# Patient Record
Sex: MALE | Race: BLACK OR AFRICAN AMERICAN | NOT HISPANIC OR LATINO | Employment: OTHER | URBAN - METROPOLITAN AREA
[De-identification: names, ages, dates, MRNs, and addresses within clinical notes are randomized per-mention and may not be internally consistent; named-entity substitution may affect disease eponyms.]

---

## 2017-03-09 ENCOUNTER — HOSPITAL ENCOUNTER (EMERGENCY)
Facility: HOSPITAL | Age: 57
Discharge: HOME | End: 2017-03-09
Attending: EMERGENCY MEDICINE
Payer: MEDICAID

## 2017-03-09 VITALS
TEMPERATURE: 98 F | BODY MASS INDEX: 25.71 KG/M2 | WEIGHT: 160 LBS | RESPIRATION RATE: 18 BRPM | OXYGEN SATURATION: 97 % | HEIGHT: 66 IN | DIASTOLIC BLOOD PRESSURE: 93 MMHG | SYSTOLIC BLOOD PRESSURE: 183 MMHG | HEART RATE: 79 BPM

## 2017-03-09 DIAGNOSIS — J44.1 COPD WITH ACUTE EXACERBATION: ICD-10-CM

## 2017-03-09 DIAGNOSIS — R06.2 WHEEZING: ICD-10-CM

## 2017-03-09 DIAGNOSIS — J18.9 PNEUMONIA, UNSPECIFIED ORGANISM: Primary | ICD-10-CM

## 2017-03-09 PROCEDURE — 25000003 PHARM REV CODE 250: Performed by: EMERGENCY MEDICINE

## 2017-03-09 PROCEDURE — 25000242 PHARM REV CODE 250 ALT 637 W/ HCPCS: Performed by: PHYSICIAN ASSISTANT

## 2017-03-09 PROCEDURE — 96372 THER/PROPH/DIAG INJ SC/IM: CPT

## 2017-03-09 PROCEDURE — 63600175 PHARM REV CODE 636 W HCPCS: Performed by: PHYSICIAN ASSISTANT

## 2017-03-09 PROCEDURE — 63600175 PHARM REV CODE 636 W HCPCS: Performed by: EMERGENCY MEDICINE

## 2017-03-09 PROCEDURE — 99284 EMERGENCY DEPT VISIT MOD MDM: CPT | Mod: 25

## 2017-03-09 PROCEDURE — 94640 AIRWAY INHALATION TREATMENT: CPT

## 2017-03-09 PROCEDURE — 25000003 PHARM REV CODE 250: Performed by: PHYSICIAN ASSISTANT

## 2017-03-09 PROCEDURE — 99284 EMERGENCY DEPT VISIT MOD MDM: CPT | Mod: ,,, | Performed by: EMERGENCY MEDICINE

## 2017-03-09 RX ORDER — ALBUTEROL SULFATE 90 UG/1
1-2 AEROSOL, METERED RESPIRATORY (INHALATION) EVERY 6 HOURS PRN
Qty: 1 INHALER | Refills: 0 | Status: SHIPPED | OUTPATIENT
Start: 2017-03-09 | End: 2017-04-08

## 2017-03-09 RX ORDER — PREDNISONE 20 MG/1
60 TABLET ORAL
Status: COMPLETED | OUTPATIENT
Start: 2017-03-09 | End: 2017-03-09

## 2017-03-09 RX ORDER — LIDOCAINE HYDROCHLORIDE 10 MG/ML
1 INJECTION INFILTRATION; PERINEURAL ONCE
Status: COMPLETED | OUTPATIENT
Start: 2017-03-09 | End: 2017-03-09

## 2017-03-09 RX ORDER — AZITHROMYCIN 250 MG/1
500 TABLET, FILM COATED ORAL
Status: COMPLETED | OUTPATIENT
Start: 2017-03-09 | End: 2017-03-09

## 2017-03-09 RX ORDER — IPRATROPIUM BROMIDE 0.5 MG/2.5ML
0.5 SOLUTION RESPIRATORY (INHALATION)
Status: COMPLETED | OUTPATIENT
Start: 2017-03-09 | End: 2017-03-09

## 2017-03-09 RX ORDER — ALBUTEROL SULFATE 2.5 MG/.5ML
2.5 SOLUTION RESPIRATORY (INHALATION)
Status: COMPLETED | OUTPATIENT
Start: 2017-03-09 | End: 2017-03-09

## 2017-03-09 RX ORDER — PREDNISONE 20 MG/1
TABLET ORAL
Qty: 8 TABLET | Refills: 0 | Status: ON HOLD | OUTPATIENT
Start: 2017-03-09 | End: 2017-05-19 | Stop reason: HOSPADM

## 2017-03-09 RX ORDER — AZITHROMYCIN 500 MG/1
TABLET, FILM COATED ORAL
Qty: 4 TABLET | Refills: 0 | Status: ON HOLD | OUTPATIENT
Start: 2017-03-09 | End: 2017-05-19 | Stop reason: HOSPADM

## 2017-03-09 RX ORDER — CEFTRIAXONE 1 G/1
1 INJECTION, POWDER, FOR SOLUTION INTRAMUSCULAR; INTRAVENOUS
Status: DISCONTINUED | OUTPATIENT
Start: 2017-03-09 | End: 2017-03-09

## 2017-03-09 RX ORDER — CEFTRIAXONE 1 G/1
1 INJECTION, POWDER, FOR SOLUTION INTRAMUSCULAR; INTRAVENOUS
Status: COMPLETED | OUTPATIENT
Start: 2017-03-09 | End: 2017-03-09

## 2017-03-09 RX ADMIN — ALBUTEROL SULFATE 2.5 MG: 2.5 SOLUTION RESPIRATORY (INHALATION) at 09:03

## 2017-03-09 RX ADMIN — LIDOCAINE HYDROCHLORIDE 1 ML: 10 INJECTION, SOLUTION INFILTRATION; PERINEURAL at 10:03

## 2017-03-09 RX ADMIN — CEFTRIAXONE 1 G: 1 INJECTION, POWDER, FOR SOLUTION INTRAMUSCULAR; INTRAVENOUS at 10:03

## 2017-03-09 RX ADMIN — IPRATROPIUM BROMIDE 0.5 MG: 0.5 SOLUTION RESPIRATORY (INHALATION) at 09:03

## 2017-03-09 RX ADMIN — PREDNISONE 60 MG: 20 TABLET ORAL at 09:03

## 2017-03-09 RX ADMIN — AZITHROMYCIN 500 MG: 250 TABLET, FILM COATED ORAL at 10:03

## 2017-03-09 RX ADMIN — ALBUTEROL SULFATE 2.5 MG: 2.5 SOLUTION RESPIRATORY (INHALATION) at 10:03

## 2017-03-09 NOTE — ED PROVIDER NOTES
Encounter Date: 3/9/2017       History     Chief Complaint   Patient presents with    Shortness of Breath     patient states that he have a hx of COPD and SOB      Review of patient's allergies indicates:  No Known Allergies  HPI Comments: 56-year-old black male presents with COPD exacerbation.  Patient states it started approximately one week and gradually become significantly worse.  Patient states he is out of his inhaler, however, does not know the name of his inhalers.  Patient has significant fatigue, decreased physical activity, dyspnea on exertion, and a nonproductive cough.  Patient denies fever, chills, chest pain, peripheral edema, or new neurological changes.  Patient denies ear pain, sore throat, nasal congestion or postnasal drip.  Patient denies dysuria, hematuria, injury or trauma.  The patient continues to smoke.  Patient presents for evaluation treatment.    The history is provided by the patient.     Past Medical History:   Diagnosis Date    Hypertension     Seizures      History reviewed. No pertinent surgical history.  Family History   Problem Relation Age of Onset    Cancer Father     Hypertension Sister     Stroke Sister     Stroke Brother     Diabetes Neg Hx     Heart disease Neg Hx      Social History   Substance Use Topics    Smoking status: Current Every Day Smoker     Packs/day: 0.50    Smokeless tobacco: None    Alcohol use No     Review of Systems   Constitutional: Positive for activity change, appetite change and fatigue.   HENT: Negative for congestion, ear pain, facial swelling, nosebleeds, postnasal drip, rhinorrhea, sore throat and trouble swallowing.    Eyes: Negative for pain and redness.   Respiratory: Positive for cough, shortness of breath and wheezing.    Gastrointestinal: Negative for abdominal pain, nausea and vomiting.   Genitourinary: Negative for dysuria, frequency and hematuria.   Musculoskeletal: Negative for gait problem.   Skin: Negative for rash and  wound.   Neurological: Negative for seizures, speech difficulty and numbness.   Psychiatric/Behavioral: Negative for confusion and hallucinations.       Physical Exam   Initial Vitals   BP Pulse Resp Temp SpO2   03/09/17 0824 03/09/17 0824 03/09/17 0824 03/09/17 0824 03/09/17 0824   180/108 88 20 97.8 °F (36.6 °C) 97 %     Physical Exam    Nursing note and vitals reviewed.  Constitutional: He appears well-developed and well-nourished.   Well-developed well-nourished, thin, 56-year-old black male appears to be in moderately severe expiratory distress.   HENT:   Head: Normocephalic and atraumatic.   Right Ear: External ear normal.   Left Ear: External ear normal.   Nose: Nose normal.   Mouth/Throat: Oropharynx is clear and moist.   Eyes: Conjunctivae and EOM are normal. Pupils are equal, round, and reactive to light.   Neck: Normal range of motion. Neck supple.   Cardiovascular: Normal rate, regular rhythm and normal heart sounds. Exam reveals no friction rub.    No murmur heard.  Pulmonary/Chest: He has wheezes in the right upper field, the right middle field, the right lower field, the left upper field, the left middle field and the left lower field.   Breath sounds are extremely distant, generalized wheezing noted.   Musculoskeletal: Normal range of motion. He exhibits no edema or tenderness.   Neurological: He is alert and oriented to person, place, and time. He has normal strength.   Skin: Skin is warm and dry.   Psychiatric: He has a normal mood and affect. His behavior is normal. Judgment and thought content normal.         ED Course   Procedures  Labs Reviewed - No data to display          Medical Decision Making:   History:   Old Medical Records: I decided to obtain old medical records.  Old Records Summarized: records from clinic visits.       <> Summary of Records: I reviewed previous emergency room records.  Initial Assessment:   56-year-old black male presents with COPD exacerbation.  Patient states it  started approximately one week and gradually become significantly worse.  Patient states he is out of his inhaler, however, does not know the name of his inhalers.  Patient has significant fatigue, decreased physical activity, dyspnea on exertion, and a nonproductive cough.  Patient denies fever, chills, chest pain, peripheral edema, or new neurological changes.  Patient denies ear pain, sore throat, nasal congestion or postnasal drip.  Patient denies dysuria, hematuria, injury or trauma.  The patient continues to smoke.  Patient presents for evaluation treatment.  Differential Diagnosis:   COPD exacerbation, pneumonia, pulmonary mass, noncompliant with medical care and follow-up treatment  Independently Interpreted Test(s):   I have ordered and independently interpreted X-rays - see summary below.       <> Summary of X-Ray Reading(s):    1. Increased coarse right midlung zone markings possibly from a developing infiltrate.  2. Question COPD.  Clinical Tests:   Radiological Study: Ordered and Reviewed  ED Management:  Patient treated with nebulizer albuterol and Atrovent with excellent results.  Patient also given unknown 60 mg orally.  Chest x-ray reveals infiltrate in the right mid lung field.  Patient given Rocephin 1 g IM, and started on Zithromax.  Patient discharged on Zithromax, albuterol inhaler, and prednisone 40 mg daily for 4 days.  Patient instructed to follow-up with his PCP in 2 days.  Patient's clinically stable and able to be discharged.                   ED Course     Clinical Impression:   The primary encounter diagnosis was Pneumonia, unspecified organism. Diagnoses of Wheezing and COPD with acute exacerbation were also pertinent to this visit.          John Glotfelty, PA  03/09/17 110

## 2017-03-09 NOTE — ED AVS SNAPSHOT
[facility]  [address]  [facility] [address]               Baltazar Mccray   3/9/2017  8:45 AM   ED    Description:  Male : 1960   Department:  [facility]           Your Care was Coordinated By:     Provider Role From To    Hung Ortega MD Attending Provider [phone] --    John Glotfelty, PA Physician Assistant [phone] --      Reason for Visit     Shortness of Breath           Diagnoses this Visit        Comments    Pneumonia, unspecified organism    -  Primary     Wheezing         COPD with acute exacerbation           ED Disposition     ED Disposition Condition Comment    Discharge             To Do List           Follow-up Information     Follow up with YOUR PHYSICIAN. Schedule an appointment as soon as possible for a visit in 2 days.       These Medications        Disp Refills Start End    albuterol 90 mcg/actuation inhaler 1 Inhaler 0 3/9/2017 2017    Inhale 1-2 puffs into the lungs every 6 (six) hours as needed for Wheezing (WITH SPACER). - Inhalation    azithromycin (ZITHROMAX) 500 MG tablet 4 tablet 0 3/9/2017     START TOMORROW.  TAKE ONE TABLET DAILY AFTER EATING,  FOR INFECTION    predniSONE (DELTASONE) 20 MG tablet 8 tablet 0 3/9/2017     TAKE 2 TABLETS DAILY AFTER EATING FOR 4 DAYS. START TOMORROW.      [facility]     [facility] Care [facility] -  Assistance  Registered nurses in the [facility] provide clinical advisement, health education, appointment booking, and other advisory services.  Call for this free service at [phone].             Medications           Message regarding Medications     Verify the changes and/or additions to your medication regime listed below are the same as discussed with your clinician today.  If any of these changes or additions are incorrect, please notify your healthcare provider.        START taking these NEW medications        Refills     albuterol 90 mcg/actuation inhaler 0    Sig: Inhale 1-2 puffs into the lungs every 6 (six) hours as needed for Wheezing (WITH SPACER).    Class: Print    Route: Inhalation    azithromycin (ZITHROMAX) 500 MG tablet 0    Sig: START TOMORROW.  TAKE ONE TABLET DAILY AFTER EATING,  FOR INFECTION    Class: Print    predniSONE (DELTASONE) 20 MG tablet 0    Sig: TAKE 2 TABLETS DAILY AFTER EATING FOR 4 DAYS. START TOMORROW.    Class: Print      These medications were administered today        Dose Freq    albuterol sulfate nebulizer solution 2.5 mg 2.5 mg Every 5 min    Sig: Take 2.5 mg by nebulization every 5 (five) minutes.    Class: Normal    Route: Nebulization    ipratropium 0.02 % nebulizer solution 0.5 mg 0.5 mg ED 1 Time    Sig: Take 2.5 mLs (0.5 mg total) by nebulization ED 1 Time.    Class: Normal    Route: Nebulization    predniSONE tablet 60 mg 60 mg ED 1 Time    Sig: Take 3 tablets (60 mg total) by mouth ED 1 Time.    Class: Normal    Route: Oral    azithromycin tablet 500 mg 500 mg ED 1 Time    Sig: Take 2 tablets (500 mg total) by mouth ED 1 Time.    Class: Normal    Route: Oral    lidocaine HCL 10 mg/ml (1%) injection 1 mL 1 mL Once    Si mL by Other route once.    Class: Normal    Route: Other    cefTRIAXone injection 1 g 1 g ED 1 Time    Sig: Inject 1 g into the muscle ED 1 Time.    Class: Normal    Route: Intramuscular           Verify that the below list of medications is an accurate representation of the medications you are currently taking.  If none reported, the list may be blank. If incorrect, please contact your healthcare provider. Carry this list with you in case of emergency.           Current Medications     albuterol 90 mcg/actuation inhaler Inhale 1-2 puffs into the lungs every 6 (six) hours as needed for Wheezing (WITH SPACER).    albuterol sulfate nebulizer solution 2.5 mg Take 2.5 mg by nebulization every 5 (five) minutes.    azithromycin (ZITHROMAX) 500 MG tablet START TOMORROW.  TAKE  "ONE TABLET DAILY AFTER EATING,  FOR INFECTION    benazepril (LOTENSIN) 40 MG tablet Take 40 mg by mouth once daily.      hydrochlorothiazide (HYDRODIURIL) 12.5 MG Tab Take 12.5 mg by mouth every 8 (eight) hours.      levetiracetam (KEPPRA) 1000 MG tablet Take 1,000 mg by mouth 2 (two) times daily.      omeprazole (PRILOSEC) 20 MG capsule Take 1 capsule (20 mg total) by mouth once daily.    predniSONE (DELTASONE) 20 MG tablet TAKE 2 TABLETS DAILY AFTER EATING FOR 4 DAYS. START TOMORROW.           Clinical Reference Information           Your Vitals Were     BP Pulse Temp Resp Height Weight    180/108 (BP Location: Right arm, Patient Position: Sitting) 88 97.8 °F (36.6 °C) (Oral) 20 5' 6" (1.676 m) 72.6 kg (160 lb)    SpO2 BMI             97% 25.82 kg/m2         Allergies as of 3/9/2017     No Known Allergies      Immunizations Administered on Date of Encounter - 3/9/2017     None      ED Micro, Lab, POCT     None      ED Imaging Orders     Start Ordered       Status Ordering Provider    03/09/17 0856 03/09/17 0855  X-Ray Chest PA And Lateral  1 time imaging     Comments:  MUST GO BY WHEEL CHAIR    Final result       Discharge References/Attachments     ADULT, PNEUMONIA (ENGLISH)      [facility] Sign-Up     Activating your [facility] account is as easy as 1-2-3!     1) Visit [email or web address], select Sign Up Now, enter this activation code and your date of birth, then select Next.  [email or web address]  Expires: 4/23/2017 10:44 AM      2) Create a username and password to use when you visit [facility] in the future and select a security question in case you lose your password and select Next.    3) Enter your e-mail address and click Sign Up!    Additional Information  If you have questions, please e-mail [email or web address] or call [phone] to talk to our [facility] staff. Remember, [facility] is NOT to be used for urgent needs. For medical emergencies, dial 911.          [facility] complies with " applicable Federal civil rights laws and does not discriminate on the basis of race, color, national origin, age, disability, or sex.        Language Assistance Services     ATTENTION: Language assistance services are available, free of charge. Please call [phone].      ATENCIÓN: Si habla krunal, tiene a burns disposición servicios gratuitos de asistencia lingüística. Llame al [phone].     CHÚ Ý: N?u b?n nói Ti?ng Vi?t, có các d?ch v? h? tr? ngôn ng? mi?n phí dành cho b?n. G?i s? [phone].

## 2017-05-14 ENCOUNTER — HOSPITAL ENCOUNTER (INPATIENT)
Facility: HOSPITAL | Age: 57
LOS: 5 days | Discharge: HOME HEALTH | DRG: 192 | End: 2017-05-19
Attending: EMERGENCY MEDICINE | Admitting: INTERNAL MEDICINE
Payer: MEDICAID

## 2017-05-14 DIAGNOSIS — J44.1 COPD EXACERBATION: ICD-10-CM

## 2017-05-14 DIAGNOSIS — J44.9 COPD (CHRONIC OBSTRUCTIVE PULMONARY DISEASE): ICD-10-CM

## 2017-05-14 LAB
ALBUMIN SERPL BCP-MCNC: 3.1 G/DL
ALLENS TEST: ABNORMAL
ALP SERPL-CCNC: 105 U/L
ALT SERPL W/O P-5'-P-CCNC: 67 U/L
ANION GAP SERPL CALC-SCNC: 11 MMOL/L
AST SERPL-CCNC: 111 U/L
BACTERIA #/AREA URNS AUTO: NORMAL /HPF
BASOPHILS # BLD AUTO: 0.06 K/UL
BASOPHILS NFR BLD: 1.2 %
BILIRUB SERPL-MCNC: 0.8 MG/DL
BILIRUB UR QL STRIP: NEGATIVE
BNP SERPL-MCNC: 225 PG/ML
BUN SERPL-MCNC: 10 MG/DL
CALCIUM SERPL-MCNC: 8.9 MG/DL
CHLORIDE SERPL-SCNC: 96 MMOL/L
CLARITY UR REFRACT.AUTO: CLEAR
CO2 SERPL-SCNC: 25 MMOL/L
COLOR UR AUTO: YELLOW
CREAT SERPL-MCNC: 0.9 MG/DL
DELSYS: ABNORMAL
DIFFERENTIAL METHOD: ABNORMAL
EOSINOPHIL # BLD AUTO: 0 K/UL
EOSINOPHIL NFR BLD: 0.6 %
ERYTHROCYTE [DISTWIDTH] IN BLOOD BY AUTOMATED COUNT: 16.5 %
EST. GFR  (AFRICAN AMERICAN): >60 ML/MIN/1.73 M^2
EST. GFR  (NON AFRICAN AMERICAN): >60 ML/MIN/1.73 M^2
FIO2: 40
FLOW: 8
GLUCOSE SERPL-MCNC: 87 MG/DL
GLUCOSE UR QL STRIP: NEGATIVE
HCO3 UR-SCNC: 26.7 MMOL/L (ref 24–28)
HCT VFR BLD AUTO: 46.3 %
HGB BLD-MCNC: 16.3 G/DL
HGB UR QL STRIP: ABNORMAL
HYALINE CASTS UR QL AUTO: 0 /LPF
INR PPP: 1.1
KETONES UR QL STRIP: ABNORMAL
LACTATE SERPL-SCNC: 1.4 MMOL/L
LEUKOCYTE ESTERASE UR QL STRIP: NEGATIVE
LYMPHOCYTES # BLD AUTO: 1.7 K/UL
LYMPHOCYTES NFR BLD: 32.6 %
MAGNESIUM SERPL-MCNC: 1.6 MG/DL
MCH RBC QN AUTO: 27.5 PG
MCHC RBC AUTO-ENTMCNC: 35.2 %
MCV RBC AUTO: 78 FL
MICROSCOPIC COMMENT: NORMAL
MODE: ABNORMAL
MONOCYTES # BLD AUTO: 0.9 K/UL
MONOCYTES NFR BLD: 18 %
NEUTROPHILS # BLD AUTO: 2.5 K/UL
NEUTROPHILS NFR BLD: 47.2 %
NITRITE UR QL STRIP: NEGATIVE
PCO2 BLDA: 46.1 MMHG (ref 35–45)
PH SMN: 7.37 [PH] (ref 7.35–7.45)
PH UR STRIP: 5 [PH] (ref 5–8)
PLATELET # BLD AUTO: 150 K/UL
PMV BLD AUTO: 10 FL
PO2 BLDA: 58 MMHG (ref 80–100)
POC BE: 1 MMOL/L
POC SATURATED O2: 89 % (ref 95–100)
POC TCO2: 28 MMOL/L (ref 23–27)
POCT GLUCOSE: 127 MG/DL (ref 70–110)
POCT GLUCOSE: 166 MG/DL (ref 70–110)
POTASSIUM SERPL-SCNC: 4.4 MMOL/L
PROCALCITONIN SERPL IA-MCNC: 0.2 NG/ML
PROT SERPL-MCNC: 7.6 G/DL
PROT UR QL STRIP: ABNORMAL
PROTHROMBIN TIME: 11.2 SEC
RBC # BLD AUTO: 5.93 M/UL
RBC #/AREA URNS AUTO: 0 /HPF (ref 0–4)
SAMPLE: ABNORMAL
SITE: ABNORMAL
SODIUM SERPL-SCNC: 132 MMOL/L
SP GR UR STRIP: 1.01 (ref 1–1.03)
SP02: 92
SQUAMOUS #/AREA URNS AUTO: 1 /HPF
TROPONIN I SERPL DL<=0.01 NG/ML-MCNC: 0.05 NG/ML
URN SPEC COLLECT METH UR: ABNORMAL
UROBILINOGEN UR STRIP-ACNC: NEGATIVE EU/DL
WBC # BLD AUTO: 5.21 K/UL
WBC #/AREA URNS AUTO: 1 /HPF (ref 0–5)

## 2017-05-14 PROCEDURE — 85610 PROTHROMBIN TIME: CPT

## 2017-05-14 PROCEDURE — 93010 ELECTROCARDIOGRAM REPORT: CPT | Mod: ,,, | Performed by: INTERNAL MEDICINE

## 2017-05-14 PROCEDURE — 94761 N-INVAS EAR/PLS OXIMETRY MLT: CPT

## 2017-05-14 PROCEDURE — 82803 BLOOD GASES ANY COMBINATION: CPT

## 2017-05-14 PROCEDURE — 63600175 PHARM REV CODE 636 W HCPCS: Performed by: INTERNAL MEDICINE

## 2017-05-14 PROCEDURE — 36600 WITHDRAWAL OF ARTERIAL BLOOD: CPT

## 2017-05-14 PROCEDURE — 87040 BLOOD CULTURE FOR BACTERIA: CPT | Mod: 59

## 2017-05-14 PROCEDURE — 63600175 PHARM REV CODE 636 W HCPCS: Performed by: EMERGENCY MEDICINE

## 2017-05-14 PROCEDURE — 25000242 PHARM REV CODE 250 ALT 637 W/ HCPCS: Performed by: EMERGENCY MEDICINE

## 2017-05-14 PROCEDURE — 96375 TX/PRO/DX INJ NEW DRUG ADDON: CPT

## 2017-05-14 PROCEDURE — 36415 COLL VENOUS BLD VENIPUNCTURE: CPT

## 2017-05-14 PROCEDURE — 83880 ASSAY OF NATRIURETIC PEPTIDE: CPT

## 2017-05-14 PROCEDURE — 99223 1ST HOSP IP/OBS HIGH 75: CPT | Mod: ,,, | Performed by: INTERNAL MEDICINE

## 2017-05-14 PROCEDURE — 99900035 HC TECH TIME PER 15 MIN (STAT)

## 2017-05-14 PROCEDURE — 84145 PROCALCITONIN (PCT): CPT

## 2017-05-14 PROCEDURE — 25000003 PHARM REV CODE 250: Performed by: EMERGENCY MEDICINE

## 2017-05-14 PROCEDURE — 25500020 PHARM REV CODE 255: Performed by: INTERNAL MEDICINE

## 2017-05-14 PROCEDURE — 94640 AIRWAY INHALATION TREATMENT: CPT

## 2017-05-14 PROCEDURE — 25000003 PHARM REV CODE 250: Performed by: PHYSICIAN ASSISTANT

## 2017-05-14 PROCEDURE — 83735 ASSAY OF MAGNESIUM: CPT

## 2017-05-14 PROCEDURE — 96365 THER/PROPH/DIAG IV INF INIT: CPT

## 2017-05-14 PROCEDURE — 25000242 PHARM REV CODE 250 ALT 637 W/ HCPCS: Performed by: INTERNAL MEDICINE

## 2017-05-14 PROCEDURE — 93005 ELECTROCARDIOGRAM TRACING: CPT

## 2017-05-14 PROCEDURE — 11000001 HC ACUTE MED/SURG PRIVATE ROOM

## 2017-05-14 PROCEDURE — 85025 COMPLETE CBC W/AUTO DIFF WBC: CPT

## 2017-05-14 PROCEDURE — 83605 ASSAY OF LACTIC ACID: CPT

## 2017-05-14 PROCEDURE — 25000003 PHARM REV CODE 250: Performed by: INTERNAL MEDICINE

## 2017-05-14 PROCEDURE — 99285 EMERGENCY DEPT VISIT HI MDM: CPT | Mod: ,,, | Performed by: EMERGENCY MEDICINE

## 2017-05-14 PROCEDURE — 99285 EMERGENCY DEPT VISIT HI MDM: CPT | Mod: 25

## 2017-05-14 PROCEDURE — 84484 ASSAY OF TROPONIN QUANT: CPT

## 2017-05-14 PROCEDURE — 27000221 HC OXYGEN, UP TO 24 HOURS

## 2017-05-14 PROCEDURE — 94760 N-INVAS EAR/PLS OXIMETRY 1: CPT

## 2017-05-14 PROCEDURE — 80053 COMPREHEN METABOLIC PANEL: CPT

## 2017-05-14 PROCEDURE — 81001 URINALYSIS AUTO W/SCOPE: CPT

## 2017-05-14 RX ORDER — SODIUM CHLORIDE 0.9 % (FLUSH) 0.9 %
3 SYRINGE (ML) INJECTION EVERY 8 HOURS
Status: DISCONTINUED | OUTPATIENT
Start: 2017-05-14 | End: 2017-05-19 | Stop reason: HOSPADM

## 2017-05-14 RX ORDER — SODIUM CHLORIDE, SODIUM LACTATE, POTASSIUM CHLORIDE, CALCIUM CHLORIDE 600; 310; 30; 20 MG/100ML; MG/100ML; MG/100ML; MG/100ML
INJECTION, SOLUTION INTRAVENOUS CONTINUOUS
Status: DISCONTINUED | OUTPATIENT
Start: 2017-05-14 | End: 2017-05-16

## 2017-05-14 RX ORDER — IPRATROPIUM BROMIDE AND ALBUTEROL SULFATE 2.5; .5 MG/3ML; MG/3ML
3 SOLUTION RESPIRATORY (INHALATION)
Status: COMPLETED | OUTPATIENT
Start: 2017-05-14 | End: 2017-05-14

## 2017-05-14 RX ORDER — ACETAMINOPHEN 500 MG
1000 TABLET ORAL
Status: COMPLETED | OUTPATIENT
Start: 2017-05-14 | End: 2017-05-14

## 2017-05-14 RX ORDER — POLYETHYLENE GLYCOL 3350 17 G/17G
17 POWDER, FOR SOLUTION ORAL 2 TIMES DAILY PRN
Status: DISCONTINUED | OUTPATIENT
Start: 2017-05-14 | End: 2017-05-19 | Stop reason: HOSPADM

## 2017-05-14 RX ORDER — BENZONATATE 100 MG/1
200 CAPSULE ORAL 3 TIMES DAILY PRN
Status: DISCONTINUED | OUTPATIENT
Start: 2017-05-14 | End: 2017-05-19 | Stop reason: HOSPADM

## 2017-05-14 RX ORDER — IPRATROPIUM BROMIDE AND ALBUTEROL SULFATE 2.5; .5 MG/3ML; MG/3ML
3 SOLUTION RESPIRATORY (INHALATION) EVERY 4 HOURS
Status: DISCONTINUED | OUTPATIENT
Start: 2017-05-14 | End: 2017-05-19 | Stop reason: HOSPADM

## 2017-05-14 RX ORDER — ACETAMINOPHEN 325 MG/1
650 TABLET ORAL EVERY 4 HOURS PRN
Status: DISCONTINUED | OUTPATIENT
Start: 2017-05-14 | End: 2017-05-19 | Stop reason: HOSPADM

## 2017-05-14 RX ORDER — BENAZEPRIL HYDROCHLORIDE 10 MG/1
40 TABLET ORAL DAILY
Status: DISCONTINUED | OUTPATIENT
Start: 2017-05-15 | End: 2017-05-19 | Stop reason: HOSPADM

## 2017-05-14 RX ORDER — MOXIFLOXACIN HYDROCHLORIDE 400 MG/1
400 TABLET ORAL DAILY
Status: DISCONTINUED | OUTPATIENT
Start: 2017-05-15 | End: 2017-05-19 | Stop reason: HOSPADM

## 2017-05-14 RX ORDER — HEPARIN SODIUM 5000 [USP'U]/ML
5000 INJECTION, SOLUTION INTRAVENOUS; SUBCUTANEOUS EVERY 8 HOURS
Status: DISCONTINUED | OUTPATIENT
Start: 2017-05-14 | End: 2017-05-19 | Stop reason: HOSPADM

## 2017-05-14 RX ORDER — MOXIFLOXACIN HYDROCHLORIDE 400 MG/250ML
400 INJECTION, SOLUTION INTRAVENOUS
Status: COMPLETED | OUTPATIENT
Start: 2017-05-14 | End: 2017-05-14

## 2017-05-14 RX ORDER — METHYLPREDNISOLONE SOD SUCC 125 MG
125 VIAL (EA) INJECTION
Status: DISCONTINUED | OUTPATIENT
Start: 2017-05-14 | End: 2017-05-14

## 2017-05-14 RX ORDER — HYDRALAZINE HYDROCHLORIDE 50 MG/1
50 TABLET, FILM COATED ORAL EVERY 6 HOURS PRN
Status: DISCONTINUED | OUTPATIENT
Start: 2017-05-14 | End: 2017-05-19 | Stop reason: HOSPADM

## 2017-05-14 RX ORDER — AMOXICILLIN 250 MG
1 CAPSULE ORAL 2 TIMES DAILY
Status: DISCONTINUED | OUTPATIENT
Start: 2017-05-14 | End: 2017-05-19 | Stop reason: HOSPADM

## 2017-05-14 RX ORDER — GUAIFENESIN 100 MG/5ML
200 SOLUTION ORAL EVERY 4 HOURS PRN
Status: DISCONTINUED | OUTPATIENT
Start: 2017-05-14 | End: 2017-05-19 | Stop reason: HOSPADM

## 2017-05-14 RX ORDER — LEVETIRACETAM 500 MG/1
1000 TABLET ORAL 2 TIMES DAILY
Status: DISCONTINUED | OUTPATIENT
Start: 2017-05-14 | End: 2017-05-19 | Stop reason: HOSPADM

## 2017-05-14 RX ADMIN — IPRATROPIUM BROMIDE AND ALBUTEROL SULFATE 3 ML: .5; 3 SOLUTION RESPIRATORY (INHALATION) at 10:05

## 2017-05-14 RX ADMIN — LEVETIRACETAM 1000 MG: 500 TABLET, FILM COATED ORAL at 09:05

## 2017-05-14 RX ADMIN — MOXIFLOXACIN HYDROCHLORIDE 400 MG: 400 INJECTION, SOLUTION INTRAVENOUS at 10:05

## 2017-05-14 RX ADMIN — STANDARDIZED SENNA CONCENTRATE AND DOCUSATE SODIUM 1 TABLET: 8.6; 5 TABLET, FILM COATED ORAL at 09:05

## 2017-05-14 RX ADMIN — GUAIFENESIN 200 MG: 100 SOLUTION ORAL at 10:05

## 2017-05-14 RX ADMIN — HEPARIN SODIUM 5000 UNITS: 5000 INJECTION, SOLUTION INTRAVENOUS; SUBCUTANEOUS at 09:05

## 2017-05-14 RX ADMIN — IPRATROPIUM BROMIDE AND ALBUTEROL SULFATE 3 ML: .5; 3 SOLUTION RESPIRATORY (INHALATION) at 03:05

## 2017-05-14 RX ADMIN — ACETAMINOPHEN 650 MG: 325 TABLET ORAL at 10:05

## 2017-05-14 RX ADMIN — IOHEXOL 75 ML: 350 INJECTION, SOLUTION INTRAVENOUS at 06:05

## 2017-05-14 RX ADMIN — HYDRALAZINE HYDROCHLORIDE 50 MG: 50 TABLET ORAL at 10:05

## 2017-05-14 RX ADMIN — BENZONATATE 200 MG: 100 CAPSULE ORAL at 07:05

## 2017-05-14 RX ADMIN — Medication 3 ML: at 10:05

## 2017-05-14 RX ADMIN — METHYLPREDNISOLONE SODIUM SUCCINATE 60 MG: 40 INJECTION, POWDER, FOR SOLUTION INTRAMUSCULAR; INTRAVENOUS at 03:05

## 2017-05-14 RX ADMIN — METHYLPREDNISOLONE SODIUM SUCCINATE 60 MG: 40 INJECTION, POWDER, FOR SOLUTION INTRAMUSCULAR; INTRAVENOUS at 09:05

## 2017-05-14 RX ADMIN — IPRATROPIUM BROMIDE AND ALBUTEROL SULFATE 3 ML: .5; 3 SOLUTION RESPIRATORY (INHALATION) at 08:05

## 2017-05-14 RX ADMIN — ACETAMINOPHEN 1000 MG: 500 TABLET ORAL at 07:05

## 2017-05-14 RX ADMIN — SODIUM CHLORIDE, SODIUM LACTATE, POTASSIUM CHLORIDE, AND CALCIUM CHLORIDE: .6; .31; .03; .02 INJECTION, SOLUTION INTRAVENOUS at 04:05

## 2017-05-14 RX ADMIN — IPRATROPIUM BROMIDE AND ALBUTEROL SULFATE 3 ML: .5; 3 SOLUTION RESPIRATORY (INHALATION) at 11:05

## 2017-05-14 NOTE — H&P
[facility] Hospital Medicine  History & Physical    Patient Name: Baltazar Mccray  MRN: [id number]  Admission Date: 5/14/2017  Attending Physician: Mckinley Sarah MD  Primary Care Provider: [facility] Medicine Team: [facility] MED  Mckinley Sarah MD     Patient information was obtained from patient and ER records.     Subjective:     Principal Problem:<principal problem not specified>    Chief Complaint:   Chief Complaint   Patient presents with    Shortness of Breath     x 2 days pt has hx of copd inital o2 sat 72%        HPI: Patient began experiencing shortness of breath and coughing yesterday afternoon and has tried to use his inhaler at home with poor results.  He presented to the ED today and was given nebs and abx.  He says his symptoms have improved somewhat.  He says he thinks this is consistent with his previous asthma, copd flares.  He says he has continued to smoke daily.  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  He says he has not had any leg swelling or pain.      Past Medical History:   Diagnosis Date    Asthma     Hypertension     Seizures        History reviewed. No pertinent surgical history.    Review of patient's allergies indicates:  No Known Allergies    No current facility-administered medications on file prior to encounter.      Current Outpatient Prescriptions on File Prior to Encounter   Medication Sig    albuterol 90 mcg/actuation inhaler Inhale 1-2 puffs into the lungs every 6 (six) hours as needed for Wheezing (WITH SPACER).    azithromycin (ZITHROMAX) 500 MG tablet START TOMORROW.  TAKE ONE TABLET DAILY AFTER EATING,  FOR INFECTION    benazepril (LOTENSIN) 40 MG tablet Take 40 mg by mouth once daily.      hydrochlorothiazide (HYDRODIURIL) 12.5 MG Tab Take 12.5 mg by mouth every 8 (eight) hours.      levetiracetam (KEPPRA) 1000 MG tablet Take 1,000 mg by mouth 2 (two) times daily.      omeprazole (PRILOSEC) 20 MG capsule Take 1  capsule (20 mg total) by mouth once daily.    predniSONE (DELTASONE) 20 MG tablet TAKE 2 TABLETS DAILY AFTER EATING FOR 4 DAYS. START TOMORROW.     Family History     Problem Relation (Age of Onset)    Cancer Father    Hypertension Sister    Stroke Sister, Brother        Social History Main Topics    Smoking status: Current Every Day Smoker     Packs/day: 0.50    Smokeless tobacco: Not on file    Alcohol use No    Drug use: No    Sexual activity: Not on file     Review of Systems   Respiratory: Positive for cough, shortness of breath and wheezing.    All other systems reviewed and are negative.    Objective:     Vital Signs (Most Recent):  Temp: 97.8 °F (36.6 °C) (05/14/17 1504)  Pulse: 88 (05/14/17 1504)  Resp: (!) 24 (05/14/17 1504)  BP: (!) 169/87 (05/14/17 1504)  SpO2: 100 % (resp tx in progress) (05/14/17 1504) Vital Signs (24h Range):  Temp:  [97.8 °F (36.6 °C)-101.5 °F (38.6 °C)] 97.8 °F (36.6 °C)  Pulse:  [] 88  Resp:  [19-38] 24  SpO2:  [85 %-100 %] 100 %  BP: (133-185)/() 169/87     Weight: 68 kg (150 lb)  Body mass index is 24.21 kg/(m^2).    Physical Exam   Constitutional: No distress.   HENT:   Head: Atraumatic.   Eyes: No scleral icterus.   Cardiovascular: Normal rate.    Pulmonary/Chest: He has wheezes.   Abdominal: Soft.   Neurological: He is alert.   Skin: Skin is warm.   Psychiatric: He has a normal mood and affect.   Nursing note and vitals reviewed.       Significant Labs:   BMP:   Recent Labs  Lab 05/14/17  0634   GLU 87   *   K 4.4   CL 96   CO2 25   BUN 10   CREATININE 0.9   CALCIUM 8.9   MG 1.6     CBC:   Recent Labs  Lab 05/14/17  0634   WBC 5.21   HGB 16.3   HCT 46.3          Significant Imaging: None    Assessment/Plan:     COPD exacerbation  Pathway started  Continue Nebs, add steroids  Moxi  IVF  ABG pending  If no improvement can consider PE rule out with CT      VTE Risk Mitigation     None        Mckinley Sarah MD  Department of Hospital Medicine    [facility]

## 2017-05-14 NOTE — ED TRIAGE NOTES
56 year old male with history of COPD presents to the ED via EMS c/o shortness of breath. Patient states that he has become progressively more short of breath over the past 2 days. Reports dry, non productive cough

## 2017-05-14 NOTE — SUBJECTIVE & OBJECTIVE
Past Medical History:   Diagnosis Date    Asthma     Hypertension     Seizures        History reviewed. No pertinent surgical history.    Review of patient's allergies indicates:  No Known Allergies    No current facility-administered medications on file prior to encounter.      Current Outpatient Prescriptions on File Prior to Encounter   Medication Sig    albuterol 90 mcg/actuation inhaler Inhale 1-2 puffs into the lungs every 6 (six) hours as needed for Wheezing (WITH SPACER).    azithromycin (ZITHROMAX) 500 MG tablet START TOMORROW.  TAKE ONE TABLET DAILY AFTER EATING,  FOR INFECTION    benazepril (LOTENSIN) 40 MG tablet Take 40 mg by mouth once daily.      hydrochlorothiazide (HYDRODIURIL) 12.5 MG Tab Take 12.5 mg by mouth every 8 (eight) hours.      levetiracetam (KEPPRA) 1000 MG tablet Take 1,000 mg by mouth 2 (two) times daily.      omeprazole (PRILOSEC) 20 MG capsule Take 1 capsule (20 mg total) by mouth once daily.    predniSONE (DELTASONE) 20 MG tablet TAKE 2 TABLETS DAILY AFTER EATING FOR 4 DAYS. START TOMORROW.     Family History     Problem Relation (Age of Onset)    Cancer Father    Hypertension Sister    Stroke Sister, Brother        Social History Main Topics    Smoking status: Current Every Day Smoker     Packs/day: 0.50    Smokeless tobacco: Not on file    Alcohol use No    Drug use: No    Sexual activity: Not on file     Review of Systems   Respiratory: Positive for cough, shortness of breath and wheezing.    All other systems reviewed and are negative.    Objective:     Vital Signs (Most Recent):  Temp: 97.8 °F (36.6 °C) (05/14/17 1504)  Pulse: 88 (05/14/17 1504)  Resp: (!) 24 (05/14/17 1504)  BP: (!) 169/87 (05/14/17 1504)  SpO2: 100 % (resp tx in progress) (05/14/17 1504) Vital Signs (24h Range):  Temp:  [97.8 °F (36.6 °C)-101.5 °F (38.6 °C)] 97.8 °F (36.6 °C)  Pulse:  [] 88  Resp:  [19-38] 24  SpO2:  [85 %-100 %] 100 %  BP: (133-185)/() 169/87     Weight: 68 kg (150  lb)  Body mass index is 24.21 kg/(m^2).    Physical Exam   Constitutional: No distress.   HENT:   Head: Atraumatic.   Eyes: No scleral icterus.   Cardiovascular: Normal rate.    Pulmonary/Chest: He has wheezes.   Abdominal: Soft.   Neurological: He is alert.   Skin: Skin is warm.   Psychiatric: He has a normal mood and affect.   Nursing note and vitals reviewed.       Significant Labs:   BMP:   Recent Labs  Lab 05/14/17  0634   GLU 87   *   K 4.4   CL 96   CO2 25   BUN 10   CREATININE 0.9   CALCIUM 8.9   MG 1.6     CBC:   Recent Labs  Lab 05/14/17  0634   WBC 5.21   HGB 16.3   HCT 46.3          Significant Imaging: None

## 2017-05-14 NOTE — PLAN OF CARE
RN: Right Care Tool completed upon admission Met      RN: Smoking cessation education completed upon admission, unless the patient is not a smoker Met      RN: Nutrition risk assessment completed upon admission Met      RN: COPD questionaire completed upon admission, unless patient is not a smoker Met      RN: Clinical Outcome: patient is alert & oriented per baseline on this shift Met      RN: Education on medication management provided with medication administration Met      RN: Learning Assessment completed upon admission Met      Identify Related Risk Factors and Signs and Symptoms Ongoing (interventions implemented as appropriate)      Absence of Falls Ongoing (interventions implemented as appropriate)      Plan of Care Review Ongoing (interventions implemented as appropriate)      Individualization & Mutuality Ongoing (interventions implemented as appropriate)      Discharge Needs Assessment Ongoing (interventions implemented as appropriate)      Interdisciplinary Rounds/Family Conf Ongoing (interventions implemented as appropriate)      Signs and Symptoms of Listed Potential Problems Will be Absent, Minimized or Managed (Chronic Obstructive Pulmonary Disease) Ongoing (interventions implemented as appropriate)

## 2017-05-14 NOTE — ED NOTES
Pt resting quietly Breath sounds clear marianela to all lung fields O2 at 40 % per venti mask on pt

## 2017-05-14 NOTE — ASSESSMENT & PLAN NOTE
Pathway started  Continue Nebs, add steroids  Moxi  IVF  ABG pending  If no improvement can consider PE rule out with CT

## 2017-05-14 NOTE — ED PROVIDER NOTES
Encounter Date: 5/14/2017    SCRIBE #1 NOTE: I, Isabel Yanez, am scribing for, and in the presence of,  Dr. Souza. I have scribed the entire note.       History     Chief Complaint   Patient presents with    Shortness of Breath     x 2 days pt has hx of copd inital o2 sat 72%     Review of patient's allergies indicates:  No Known Allergies  HPI Comments: Time seen by provider: 6:34 AM    This is a 56 y.o. male with history of HTN, seizures, and COPD who presents with complaint of acute SOB and chest tightness since yesterday. Pt states he used an inhaler with no relief. He reports a history of heavy smoking with 2-3 packs a day but notes that he only smokes half a pack a day now. Pt endorses weight loss, night sweats, and back pain. Pt denies cough, rhinorrhea, chills, appetite change, leg swelling, sore throat, nosebleeds, abdominal pain, constipation, diarrhea, blood in stool, difficulty urinating, and chest pain.     The history is provided by the patient.     Past Medical History:   Diagnosis Date    Asthma     Hypertension     Seizures      History reviewed. No pertinent surgical history.  Family History   Problem Relation Age of Onset    Cancer Father     Hypertension Sister     Stroke Sister     Stroke Brother     Diabetes Neg Hx     Heart disease Neg Hx      Social History   Substance Use Topics    Smoking status: Current Every Day Smoker     Packs/day: 0.50    Smokeless tobacco: Not on file    Alcohol use No     Review of Systems   Constitutional: Positive for unexpected weight change (Loss). Negative for appetite change and chills.        Positive for night sweats.    HENT: Negative for nosebleeds, rhinorrhea and sore throat.    Eyes: Negative for visual disturbance.   Respiratory: Positive for chest tightness and shortness of breath. Negative for cough.    Cardiovascular: Negative for chest pain and leg swelling.   Gastrointestinal: Negative for abdominal pain, blood in stool,  constipation, diarrhea and vomiting.   Genitourinary: Negative for difficulty urinating.   Musculoskeletal: Positive for back pain.   Skin: Negative for rash.   Neurological: Negative for headaches.       Physical Exam   Initial Vitals   BP Pulse Resp Temp SpO2   05/14/17 0610 05/14/17 0610 05/14/17 0610 05/14/17 0610 05/14/17 0610   185/115 120 34 101.5 °F (38.6 °C) 97 %     Physical Exam    Constitutional: He is cooperative. He appears ill. He appears distressed.   HENT:   Head: Normocephalic and atraumatic.   Eyes: Conjunctivae are normal. No scleral icterus.   Neck: Trachea normal and normal range of motion. Neck supple. No JVD present.   Cardiovascular: Regular rhythm. Tachycardia present.  Exam reveals distant heart sounds.    Pulmonary/Chest: Accessory muscle usage present. Tachypnea noted. He is in respiratory distress. He has wheezes.   Abdominal: Bowel sounds are normal. There is no tenderness.   Musculoskeletal: Normal range of motion.   Neurological: He is alert and oriented to person, place, and time. No cranial nerve deficit. GCS eye subscore is 4. GCS verbal subscore is 5. GCS motor subscore is 6.   Skin: Skin is warm and dry.         ED Course   Procedures  Labs Reviewed   CBC W/ AUTO DIFFERENTIAL - Abnormal; Notable for the following:        Result Value    MCV 78 (*)     RDW 16.5 (*)     Mono% 18.0 (*)     All other components within normal limits   COMPREHENSIVE METABOLIC PANEL - Abnormal; Notable for the following:     Sodium 132 (*)     Albumin 3.1 (*)      (*)     ALT 67 (*)     All other components within normal limits   URINALYSIS - Abnormal; Notable for the following:     Protein, UA 2+ (*)     Ketones, UA Trace (*)     Occult Blood UA 1+ (*)     All other components within normal limits   TROPONIN I - Abnormal; Notable for the following:     Troponin I 0.048 (*)     All other components within normal limits   B-TYPE NATRIURETIC PEPTIDE - Abnormal; Notable for the following:     BNP  225 (*)     All other components within normal limits   PROTIME-INR   MAGNESIUM   LACTIC ACID, PLASMA   URINALYSIS MICROSCOPIC     EKG Readings: (Independently Interpreted)   Sinus tachycardia at 114. No STEMI.       X-Rays:   Independently Interpreted Readings:   Chest X-Ray: No acute process     Medical Decision Making:   History:   Old Medical Records: I decided to obtain old medical records.  Initial Assessment:   Patient with history of COPD presenting today with respiratory distress tachypnea and wheezing  Differential Diagnosis:   COPD with exacerbation, bronchitis, pneumonia, CHF  Independently Interpreted Test(s):   I have ordered and independently interpreted EKG Reading(s) - see prior notes  Clinical Tests:   Lab Tests: Reviewed and Ordered  Radiological Study: Reviewed and Ordered  Medical Tests: Reviewed and Ordered  ED Management:  Patient treated with IV fluids labs were drawn duo nebs given imaging also done EKG also performed            Scribe Attestation:   Scribe #1: I performed the above scribed service and the documentation accurately describes the services I performed. I attest to the accuracy of the note.    Attending Attestation:           Physician Attestation for Scribe:  Physician Attestation Statement for Scribe #1: I, Dr. Souza, reviewed documentation, as scribed by Isabel Yanez in my presence, and it is both accurate and complete.         Attending ED Notes:   Patient presenting with acute respiratory distress does have history of COPD patient evaluated in the ED with labs EKG imaging is felt that the patient was having COPD with exacerbation treated with DuoNeb's of steroid patient did not continue to return to baseline and the patient was subsequently admitted to the internal medicine service for further care and evaluation          ED Course     Clinical Impression:   Diagnoses of COPD exacerbation and COPD (chronic obstructive pulmonary disease) were pertinent to this  visit.    Disposition:   Disposition: Admitted  Condition: Serious       Wellington Ndiaye MD  06/08/17 0800

## 2017-05-14 NOTE — PLAN OF CARE
Problem: Fall Risk (Adult)  Goal: Identify Related Risk Factors and Signs and Symptoms  Related risk factors and signs and symptoms are identified upon initiation of Human Response Clinical Practice Guideline (CPG)   Outcome: Ongoing (interventions implemented as appropriate)  Pt is a fall risk and educated on his fall risk status and how to call for help.     Problem: Chronic Obstructive Pulmonary Disease (Adult)  Goal: Signs and Symptoms of Listed Potential Problems Will be Absent, Minimized or Managed (Chronic Obstructive Pulmonary Disease)  Signs and symptoms of listed potential problems will be absent, minimized or managed by discharge/transition of care (reference Chronic Obstructive Pulmonary Disease (Adult) CPG).   Outcome: Ongoing (interventions implemented as appropriate)  Pt being treated with antibiotics and on oxygen at 40% venti mask.

## 2017-05-14 NOTE — ED NOTES
Pt transported to [facility] via stretcher with escort on O2 at 40 % per venti mask  Pt condition stable on leaving the ED, pt belongings are with pt, and pt will notify his family of his room number

## 2017-05-14 NOTE — IP AVS SNAPSHOT
[facility]  [address]  [facility] [address]  Phone: [phone]           Patient Discharge Instructions   Our goal is to set you up for success. This packet includes information on your condition, medications, and your home care.  It will help you care for yourself to prevent having to return to the hospital.     Please ask your nurse if you have any questions.      There are many details to remember when preparing to leave the hospital. Here is what you will need to do:    1. Take your medicine. If you are prescribed medications, review your Medication List on the following pages. You may have new medications to  at the pharmacy and others that you'll need to stop taking. Review the instructions for how and when to take your medications. Talk with your doctor or nurses if you are unsure of what to do.     2. Go to your follow-up appointments. Specific follow-up information is listed in the following pages. Your may be contacted by a nurse or clinical provider about future appointments. Be sure we have all of the phone numbers to reach you. Please contact your provider's office if you are unable to make an appointment.     3. Watch for warning signs. Your doctor or nurse will give you detailed warning signs to watch for and when to call for assistance. These instructions may also include educational information about your condition. If you experience any of warning signs to your health, call your doctor.           [facility]  Unless otherwise directed by your provider, please   contact [facility], our nurse care line   that is available for 24/7 assistance.     [phone] (toll-free)     Registered nurses in the [facility]   provide: appointment scheduling, clinical advisement, health education, and other advisory services.                  ** Verify the list of medication(s) below is accurate and up to date. Carry this with you in case of  emergency. If your medications have changed, please notify your healthcare provider.             Medication List      START taking these medications        Additional Info                      benzonatate 200 MG capsule   Commonly known as:  TESSALON   Quantity:  60 capsule   Refills:  0   Dose:  200 mg    Last time this was given:  200 mg on 5/19/2017  8:50 AM   Instructions:  Take 1 capsule (200 mg total) by mouth 3 (three) times daily as needed for Cough.     Begin Date    AM    Noon    PM    Bedtime       fluticasone-vilanterol 200-25 mcg/dose Dsdv diskus inhaler   Commonly known as:  BREO   Quantity:  14 each   Refills:  3   Dose:  1 puff    Last time this was given:  1 puff on 5/19/2017  8:51 AM   Instructions:  Inhale 1 puff into the lungs once daily. Controller     Begin Date    AM    Noon    PM    Bedtime       guaifenesin 100 mg/5 ml 100 mg/5 mL syrup   Commonly known as:  ROBITUSSIN   Refills:  0   Dose:  200 mg    Last time this was given:  200 mg on 5/16/2017  9:52 AM   Instructions:  Take 10 mLs (200 mg total) by mouth every 4 (four) hours as needed for Cough or Congestion.     Begin Date    AM    Noon    PM    Bedtime       senna-docusate 8.6-50 mg 8.6-50 mg per tablet   Commonly known as:  PERICOLACE   Refills:  0   Dose:  1 tablet    Last time this was given:  1 tablet on 5/19/2017  8:51 AM   Instructions:  Take 1 tablet by mouth 2 (two) times daily.     Begin Date    AM    Noon    PM    Bedtime       tiotropium 18 mcg inhalation capsule   Commonly known as:  SPIRIVA   Quantity:  30 capsule   Refills:  11   Dose:  1 capsule    Last time this was given:  18 mcg on 5/19/2017  8:52 AM   Instructions:  Inhale 1 capsule (18 mcg total) into the lungs once daily. Controller     Begin Date    AM    Noon    PM    Bedtime         CHANGE how you take these medications        Additional Info                      * albuterol 90 mcg/actuation inhaler   Quantity:  1 Inhaler   Refills:  0   Dose:  1-2 puff   What  changed:  Another medication with the same name was added. Make sure you understand how and when to take each.    Instructions:  Inhale 1-2 puffs into the lungs every 6 (six) hours as needed for Wheezing (WITH SPACER).     Begin Date    AM    Noon    PM    Bedtime       * albuterol 90 mcg/actuation inhaler   Quantity:  1 each   Refills:  11   Dose:  2 puff   What changed:  You were already taking a medication with the same name, and this prescription was added. Make sure you understand how and when to take each.    Instructions:  Inhale 2 puffs into the lungs every 6 (six) hours as needed for Wheezing.     Begin Date    AM    Noon    PM    Bedtime       predniSONE 20 MG tablet   Commonly known as:  DELTASONE   Quantity:  9 tablet   Refills:  0   Dose:  60 mg   What changed:    - how much to take  - how to take this  - when to take this  - additional instructions    Last time this was given:  60 mg on 5/19/2017  8:51 AM   Instructions:  Take 3 tablets (60 mg total) by mouth once daily.     Begin Date    AM    Noon    PM    Bedtime       * Notice:  This list has 2 medication(s) that are the same as other medications prescribed for you. Read the directions carefully, and ask your doctor or other care provider to review them with you.      CONTINUE taking these medications        Additional Info                      benazepril 40 MG tablet   Commonly known as:  LOTENSIN   Refills:  0   Dose:  40 mg    Last time this was given:  40 mg on 5/19/2017  8:51 AM   Instructions:  Take 40 mg by mouth once daily.     Begin Date    AM    Noon    PM    Bedtime       hydrochlorothiazide 12.5 MG Tab   Commonly known as:  HYDRODIURIL   Refills:  0   Dose:  12.5 mg    Instructions:  Take 12.5 mg by mouth every 8 (eight) hours.     Begin Date    AM    Noon    PM    Bedtime       levetiracetam 1000 MG tablet   Commonly known as:  KEPPRA   Refills:  0   Dose:  1000 mg    Last time this was given:  1,000 mg on 5/19/2017  8:51 AM    Instructions:  Take 1,000 mg by mouth 2 (two) times daily.     Begin Date    AM    Noon    PM    Bedtime       omeprazole 20 MG capsule   Commonly known as:  PRILOSEC   Quantity:  30 capsule   Refills:  1   Dose:  20 mg    Instructions:  Take 1 capsule (20 mg total) by mouth once daily.     Begin Date    AM    Noon    PM    Bedtime         STOP taking these medications     azithromycin 500 MG tablet   Commonly known as:  ZITHROMAX            Where to Get Your Medications      These medications were sent to [facility]/pharmacy #[id number] - [place], [place] - [address]  [address] [facility] [address]     Phone:  [phone]     albuterol 90 mcg/actuation inhaler    benzonatate 200 MG capsule    fluticasone-vilanterol 200-25 mcg/dose Dsdv diskus inhaler    predniSONE 20 MG tablet    tiotropium 18 mcg inhalation capsule         You can get these medications from any pharmacy     You don't need a prescription for these medications     guaifenesin 100 mg/5 ml 100 mg/5 mL syrup    senna-docusate 8.6-50 mg 8.6-50 mg per tablet                  Please bring to all follow up appointments:    1. A copy of your discharge instructions.  2. All medicines you are currently taking in their original bottles.  3. Identification and insurance card.    Please arrive 15 minutes ahead of scheduled appointment time.    Please call 24 hours in advance if you must reschedule your appointment and/or time.        Your Scheduled Appointments     May 22, 2017  4:00 PM [facility] Follow Up with Ivon A. Melinda-Pastrana, FNP   [facility]    [address]  [facility] [address] [phone]              Follow-up Information     Follow up with Sherif Guzmán.    Specialty:  [facility]    Contact information:    [address]  [facility] [address] [phone]    Additional information:    [facility]  "[facility]        Discharge Instructions     Future Orders    OXYGEN FOR HOME USE     Questions:    Liter Flow:  2    Duration:  Continuous    Qualifying SpO2:  84%    Testing done at:  Rest    Route:  nasal cannula    Portable mode:  pulse dose acceptable    Device:  home concentrator with portable unit    Length of need (in months):  99 mos    Patient condition with qualifying saturation:  COPD    Height:  5' 6" (1.676 m)    Weight:  59.1 kg (130 lb 4.8 oz)    Does patient have medical equipment at home?:  none    Alternative treatment measures have been tried or considered and deemed clinically ineffective.:  Yes    Vendor:  [facility] [employer] [facility] Comment - [facility] to deliver, orders given to Dariana    Expected Date of Delivery:  5/19/2017    Expected Time of Delivery:          Primary Diagnosis     Your primary diagnosis was:  Chronic Bronchitis      Admission Information     Date & Time Provider Department CSN    5/14/2017  6:27 AM Renny Alexis MD [facility] [id number]      Care Providers     Provider Role Specialty Primary office phone    Renny Alexis MD Attending Provider Hospitalist [phone]    Renny Alexis MD Team Attending  Hospitalist [phone]      Your Vitals Were     BP Pulse Temp Resp Height Weight    160/84 97 98.5 °F (36.9 °C) (Oral) 20 5' 6" (1.676 m) 59.1 kg (130 lb 4.8 oz)    SpO2 BMI             95% 21.03 kg/m2         Recent Lab Values     No lab values to display.      Allergies as of 5/19/2017     No Known Allergies      Advance Directives     An advance directive is a document which, in the event you are no longer able to make decisions for yourself, tells your healthcare team what kind of treatment you do or do not want to receive, or who you would like to make those decisions for you.  If you do not currently have an advance directive, [facility] encourages you to create one.  For more information call:  [phone] ([phone]), [phone] " ([phone]),  or log on to [email or web address].        Smoking Cessation     If you would like to quit smoking:   You may be eligible for free services if you are a [place] resident and started smoking cigarettes before September 1, 1988.  Call the Smoking Cessation Trust (SCT) toll free at (231) 896-5503 or (574) 810-4079.   Call 1-800-QUIT-NOW if you do not meet the above criteria.   Contact us via email: [email or web address]   View our website for more information: [email or web address]        Language Assistance Services     ATTENTION: Language assistance services are available, free of charge. Please call [phone].      ATENCIÓN: Si habla krunal, tiene a burns disposición servicios gratuitos de asistencia lingüística. Llame al [phone].     CHÚ Ý: N?u b?n nói Ti?ng Vi?t, có các d?ch v? h? tr? ngôn ng? mi?n phí dành cho b?n. G?i s? [phone].        [facility] Sign-Up     Activating your [facility] account is as easy as 1-2-3!     1) Visit [email or web address], select Sign Up Now, enter this activation code and your date of birth, then select Next.  [email or web address]  Expires: 7/3/2017  3:24 PM      2) Create a username and password to use when you visit [facility] in the future and select a security question in case you lose your password and select Next.    3) Enter your e-mail address and click Sign Up!    Additional Information  If you have questions, please e-mail [email or web address] or call [phone] to talk to our [facility] staff. Remember, [facility] is NOT to be used for urgent needs. For medical emergencies, dial 911.          [facility] complies with applicable Federal civil rights laws and does not discriminate on the basis of race, color, national origin, age, disability, or sex.

## 2017-05-14 NOTE — ED NOTES
Pt is AA&O times four  Resp full and reg Crackles auscultated on expiration to all lung fields  Radial pulses strong and reg Tachycardia noted with heart rate at 104 bpm  O2 per non-rebreather mask on at 15 L/min

## 2017-05-15 LAB
ANION GAP SERPL CALC-SCNC: 9 MMOL/L
BASOPHILS # BLD AUTO: 0.01 K/UL
BASOPHILS NFR BLD: 0.1 %
BUN SERPL-MCNC: 10 MG/DL
CALCIUM SERPL-MCNC: 8.7 MG/DL
CHLORIDE SERPL-SCNC: 98 MMOL/L
CO2 SERPL-SCNC: 29 MMOL/L
CREAT SERPL-MCNC: 0.8 MG/DL
DIFFERENTIAL METHOD: ABNORMAL
EOSINOPHIL # BLD AUTO: 0 K/UL
EOSINOPHIL NFR BLD: 0 %
ERYTHROCYTE [DISTWIDTH] IN BLOOD BY AUTOMATED COUNT: 16.6 %
EST. GFR  (AFRICAN AMERICAN): >60 ML/MIN/1.73 M^2
EST. GFR  (NON AFRICAN AMERICAN): >60 ML/MIN/1.73 M^2
GLUCOSE SERPL-MCNC: 144 MG/DL
HCT VFR BLD AUTO: 44.2 %
HGB BLD-MCNC: 14.8 G/DL
LYMPHOCYTES # BLD AUTO: 0.4 K/UL
LYMPHOCYTES NFR BLD: 5.2 %
MCH RBC QN AUTO: 27.2 PG
MCHC RBC AUTO-ENTMCNC: 33.5 %
MCV RBC AUTO: 81 FL
MONOCYTES # BLD AUTO: 0.3 K/UL
MONOCYTES NFR BLD: 4.1 %
NEUTROPHILS # BLD AUTO: 6.4 K/UL
NEUTROPHILS NFR BLD: 90.5 %
PLATELET # BLD AUTO: 163 K/UL
PMV BLD AUTO: 10.6 FL
POCT GLUCOSE: 214 MG/DL (ref 70–110)
POTASSIUM SERPL-SCNC: 3.9 MMOL/L
RBC # BLD AUTO: 5.45 M/UL
SODIUM SERPL-SCNC: 136 MMOL/L
TROPONIN I SERPL DL<=0.01 NG/ML-MCNC: 0.02 NG/ML
TROPONIN I SERPL DL<=0.01 NG/ML-MCNC: 0.03 NG/ML
WBC # BLD AUTO: 7.12 K/UL

## 2017-05-15 PROCEDURE — 94640 AIRWAY INHALATION TREATMENT: CPT

## 2017-05-15 PROCEDURE — 11000001 HC ACUTE MED/SURG PRIVATE ROOM

## 2017-05-15 PROCEDURE — 25000003 PHARM REV CODE 250: Performed by: INTERNAL MEDICINE

## 2017-05-15 PROCEDURE — 25000003 PHARM REV CODE 250: Performed by: PHYSICIAN ASSISTANT

## 2017-05-15 PROCEDURE — 84484 ASSAY OF TROPONIN QUANT: CPT

## 2017-05-15 PROCEDURE — 97165 OT EVAL LOW COMPLEX 30 MIN: CPT

## 2017-05-15 PROCEDURE — 63600175 PHARM REV CODE 636 W HCPCS: Performed by: INTERNAL MEDICINE

## 2017-05-15 PROCEDURE — 85025 COMPLETE CBC W/AUTO DIFF WBC: CPT

## 2017-05-15 PROCEDURE — 99232 SBSQ HOSP IP/OBS MODERATE 35: CPT | Mod: ,,, | Performed by: INTERNAL MEDICINE

## 2017-05-15 PROCEDURE — 80048 BASIC METABOLIC PNL TOTAL CA: CPT

## 2017-05-15 PROCEDURE — 27000221 HC OXYGEN, UP TO 24 HOURS

## 2017-05-15 PROCEDURE — 25000242 PHARM REV CODE 250 ALT 637 W/ HCPCS: Performed by: INTERNAL MEDICINE

## 2017-05-15 PROCEDURE — 97162 PT EVAL MOD COMPLEX 30 MIN: CPT

## 2017-05-15 PROCEDURE — 36415 COLL VENOUS BLD VENIPUNCTURE: CPT

## 2017-05-15 RX ADMIN — IPRATROPIUM BROMIDE AND ALBUTEROL SULFATE 3 ML: .5; 3 SOLUTION RESPIRATORY (INHALATION) at 11:05

## 2017-05-15 RX ADMIN — ACETAMINOPHEN 650 MG: 325 TABLET ORAL at 07:05

## 2017-05-15 RX ADMIN — GUAIFENESIN 200 MG: 100 SOLUTION ORAL at 03:05

## 2017-05-15 RX ADMIN — MOXIFLOXACIN HYDROCHLORIDE 400 MG: 400 TABLET, FILM COATED ORAL at 08:05

## 2017-05-15 RX ADMIN — LEVETIRACETAM 1000 MG: 500 TABLET, FILM COATED ORAL at 08:05

## 2017-05-15 RX ADMIN — GUAIFENESIN 200 MG: 100 SOLUTION ORAL at 07:05

## 2017-05-15 RX ADMIN — IPRATROPIUM BROMIDE AND ALBUTEROL SULFATE 3 ML: .5; 3 SOLUTION RESPIRATORY (INHALATION) at 04:05

## 2017-05-15 RX ADMIN — STANDARDIZED SENNA CONCENTRATE AND DOCUSATE SODIUM 1 TABLET: 8.6; 5 TABLET, FILM COATED ORAL at 09:05

## 2017-05-15 RX ADMIN — Medication 3 ML: at 10:05

## 2017-05-15 RX ADMIN — SODIUM CHLORIDE, SODIUM LACTATE, POTASSIUM CHLORIDE, AND CALCIUM CHLORIDE: .6; .31; .03; .02 INJECTION, SOLUTION INTRAVENOUS at 08:05

## 2017-05-15 RX ADMIN — BENAZEPRIL HYDROCHLORIDE 40 MG: 10 TABLET, FILM COATED ORAL at 08:05

## 2017-05-15 RX ADMIN — HEPARIN SODIUM 5000 UNITS: 5000 INJECTION, SOLUTION INTRAVENOUS; SUBCUTANEOUS at 09:05

## 2017-05-15 RX ADMIN — METHYLPREDNISOLONE SODIUM SUCCINATE 60 MG: 40 INJECTION, POWDER, FOR SOLUTION INTRAMUSCULAR; INTRAVENOUS at 02:05

## 2017-05-15 RX ADMIN — HEPARIN SODIUM 5000 UNITS: 5000 INJECTION, SOLUTION INTRAVENOUS; SUBCUTANEOUS at 02:05

## 2017-05-15 RX ADMIN — STANDARDIZED SENNA CONCENTRATE AND DOCUSATE SODIUM 1 TABLET: 8.6; 5 TABLET, FILM COATED ORAL at 08:05

## 2017-05-15 RX ADMIN — LEVETIRACETAM 1000 MG: 500 TABLET, FILM COATED ORAL at 09:05

## 2017-05-15 RX ADMIN — HEPARIN SODIUM 5000 UNITS: 5000 INJECTION, SOLUTION INTRAVENOUS; SUBCUTANEOUS at 05:05

## 2017-05-15 RX ADMIN — METHYLPREDNISOLONE SODIUM SUCCINATE 60 MG: 40 INJECTION, POWDER, FOR SOLUTION INTRAMUSCULAR; INTRAVENOUS at 05:05

## 2017-05-15 RX ADMIN — ACETAMINOPHEN 650 MG: 325 TABLET ORAL at 03:05

## 2017-05-15 RX ADMIN — ACETAMINOPHEN 650 MG: 325 TABLET ORAL at 10:05

## 2017-05-15 RX ADMIN — IPRATROPIUM BROMIDE AND ALBUTEROL SULFATE 3 ML: .5; 3 SOLUTION RESPIRATORY (INHALATION) at 07:05

## 2017-05-15 RX ADMIN — IPRATROPIUM BROMIDE AND ALBUTEROL SULFATE 3 ML: .5; 3 SOLUTION RESPIRATORY (INHALATION) at 08:05

## 2017-05-15 RX ADMIN — BENZONATATE 200 MG: 100 CAPSULE ORAL at 10:05

## 2017-05-15 RX ADMIN — IPRATROPIUM BROMIDE AND ALBUTEROL SULFATE 3 ML: .5; 3 SOLUTION RESPIRATORY (INHALATION) at 03:05

## 2017-05-15 RX ADMIN — METHYLPREDNISOLONE SODIUM SUCCINATE 60 MG: 40 INJECTION, POWDER, FOR SOLUTION INTRAMUSCULAR; INTRAVENOUS at 09:05

## 2017-05-15 NOTE — PT/OT/SLP EVAL
Occupational Therapy  Evaluation    Baltazar Mccray   MRN: [id number]   Admitting Diagnosis: COPD exacerbation    OT Date of Treatment: 05/15/17   OT Start Time: 1355  OT Stop Time: 1410  OT Total Time (min): 15 min    Billable Minutes:  Evaluation 15    Diagnosis:  COPD exacerbation    Past Medical History:   Diagnosis Date    Asthma     Hypertension     Seizures       History reviewed. No pertinent surgical history.    General Precautions: Standard, fall    Patient History:  Living Environment  Lives With:  (roomate)  Living Arrangements: house  Home Accessibility:  (no concerns)  Home Layout: Able to live on 1st floor  Transportation Available: public transportation  Living Environment Comment: Pt with no Spv/(A) available. Pt reports being (I) with ADLs and able to walk 2-3 blocks before becoming SOB  Equipment Currently Used at Home: none    Prior level of function:   Bed Mobility/Transfers: independent  Grooming: independent  Bathing: independent  Upper Body Dressing: independent  Lower Body Dressing: independent  Toileting: independent    Subjective:  Communicated with RN prior to session.    Pt agreeable to Evaluation    Pain Ratin/10    Objective:  Patient found with: oxygen, peripheral IV (ventimask)    Upper Extremity Range of Motion:  Right Upper Extremity: WFL  Left Upper Extremity: WFL    Upper Extremity Strength:  Right Upper Extremity: WFL  Left Upper Extremity: WFL    Functional Mobility:  Bed Mobility:  Supine to Sit: Supervision    Transfers:  Sit <> Stand Assistance: Stand By Assistance  Sit <> Stand Assistive Device: No Assistive Device    Functional Ambulation: CGA with  feet on 8L O2. Sats 85%, recovered to 88% once seated EOB    Activities of Daily Living:  UE Dressing Level of Assistance: Minimum assistance    LE Dressing Level of Assistance: Stand by assistance    Therapeutic Activities and Exercises:  Pt educated on   · OT role/POC  · Pursed lip breathing  · Importance on sitting EOB  "or UIC    AM-PAC 6 CLICK ADL  How much help from another person does this patient currently need?  1 = Unable, Total/Dependent Assistance  2 = A lot, Maximum/Moderate Assistance  3 = A little, Minimum/Contact Guard/Supervision  4 = None, Modified Benzie/Independent    Putting on and taking off regular lower body clothing? : 3  Bathing (including washing, rinsing, drying)?: 3  Toileting, which includes using toilet, bedpan, or urinal? : 3  Putting on and taking off regular upper body clothing?: 4  Taking care of personal grooming such as brushing teeth?: 4  Eating meals?: 4  Total Score: 21    AM-PAC Raw Score CMS "G-Code Modifier Level of Impairment Assistance   6 % Total / Unable   7 - 9 CM 80 - 100% Maximal Assist   10 - 14 CL 60 - 80% Moderate Assist   15 - 19 CK 40 - 60% Moderate Assist   20 - 22 CJ 20 - 40% Minimal Assist   23 CI 1-20% SBA / CGA   24 CH 0% Independent/ Mod I       Patient left seated EOB with all lines intact, call button in reach and RN notified    Assessment:  Baltazar Mccray is a 56 y.o. male with a medical diagnosis of COPD and presents with decreased endurance impeding his ability to perform ADLs and IADLs and would benefit from OT services to maximize functional (I) and safety.    Rehab identified problem list/impairments: Rehab identified problem list/impairments: weakness, impaired endurance, impaired self care skills, impaired functional mobilty, impaired cardiopulmonary response to activity, gait instability    Rehab potential is good.    Activity tolerance: Good    Discharge recommendations: Discharge Facility/Level Of Care Needs: home with home health     Barriers to discharge: Barriers to Discharge: Decreased caregiver support    Equipment recommendations: none     GOALS:   Occupational Therapy Goals        Problem: Occupational Therapy Goal    Goal Priority Disciplines Outcome Interventions   Occupational Therapy Goal     OT, PT/OT     Description:  Goals to be met by: " 05/22    Patient will increase functional independence with ADLs by performing:    UE Dressing with Supervision.  LE Dressing with Supervision with AD as needed.  Grooming while standing with Supervision.  Toileting from bedside commode with Supervision for hygiene and clothing management.   Stand pivot transfers with Supervision.  Toilet transfer to bedside commode with Supervision.                   PLAN:  Patient to be seen 3 x/week to address the above listed problems via self-care/home management, therapeutic activities, therapeutic exercises  Plan of Care reviewed with: patient    NISH Herring  05/15/2017

## 2017-05-15 NOTE — PROGRESS NOTES
[facility] Hospital Medicine  Progress Note    Patient Name: Baltazar Mccray  MRN: [id number]  Patient Class: IP- Inpatient   Admission Date: 5/14/2017  Length of Stay: 1 days  Attending Physician: Mckinley Sarah MD  Primary Care Provider: Primary Doctor [facility] Medicine Team: [facility] MED G Mckinley Sarah MD    Subjective:     Principal Problem:<principal problem not specified>    HPI:  Patient began experiencing shortness of breath and coughing yesterday afternoon and has tried to use his inhaler at home with poor results.  He presented to the ED today and was given nebs and abx.  He says his symptoms have improved somewhat.  He says he thinks this is consistent with his previous asthma, copd flares.  He says he has continued to smoke daily.  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  He says he has not had any leg swelling or pain.      Hospital Course:       Interval History:     Still coughing, still short of breath.  Does not feel he is improving much.      Review of Systems   Respiratory: Positive for cough and shortness of breath.    All other systems reviewed and are negative.    Objective:     Vital Signs (Most Recent):  Temp: 98 °F (36.7 °C) (05/15/17 1212)  Pulse: 96 (05/15/17 1212)  Resp: 20 (05/15/17 1212)  BP: 138/78 (05/15/17 1212)  SpO2: (!) 93 % (05/15/17 1212) Vital Signs (24h Range):  Temp:  [97.7 °F (36.5 °C)-98.6 °F (37 °C)] 98 °F (36.7 °C)  Pulse:  [] 96  Resp:  [18-38] 20  SpO2:  [90 %-100 %] 93 %  BP: (138-178)/(78-93) 138/78     Weight: 59.1 kg (130 lb 4.8 oz)  Body mass index is 21.03 kg/(m^2).    Intake/Output Summary (Last 24 hours) at 05/15/17 1406  Last data filed at 05/15/17 0000   Gross per 24 hour   Intake                0 ml   Output              950 ml   Net             -950 ml      Physical Exam   Constitutional: He appears well-nourished. No distress.   Cardiovascular: Normal rate.    Pulmonary/Chest: He is in respiratory distress.    Abdominal: Soft.   Neurological: He is alert.   Skin: Skin is warm.   Psychiatric: He has a normal mood and affect.   Nursing note and vitals reviewed.      Significant Labs:   BMP:   Recent Labs  Lab 05/14/17  0634 05/15/17  0435   GLU 87 144*   * 136   K 4.4 3.9   CL 96 98   CO2 25 29   BUN 10 10   CREATININE 0.9 0.8   CALCIUM 8.9 8.7   MG 1.6  --      CBC:   Recent Labs  Lab 05/14/17  0634 05/15/17  0435   WBC 5.21 7.12   HGB 16.3 14.8   HCT 46.3 44.2    163       Significant Imaging: None    Assessment/Plan:      COPD exacerbation  Pathway started  Continue Nebs, steroids  Moxi  IVF  CT negative for PE  Pulm Consulted  Echo pending to assess for Pulm HTN  Hypoxemia continues with little/no improvment      VTE Risk Mitigation         Ordered     heparin (porcine) injection 5,000 Units  Every 8 hours     Route:  Subcutaneous        05/14/17 1621     Medium Risk of VTE  Once      05/14/17 1621     Place JORJE hose  Until discontinued      05/14/17 1621     Place sequential compression device  Until discontinued      05/14/17 1621          Mckinley Sarah MD  Department of Hospital Medicine   [facility]

## 2017-05-15 NOTE — ASSESSMENT & PLAN NOTE
Pathway started  Continue Nebs, steroids  Moxi  IVF  CT negative for PE  Pulm Consulted  Echo pending to assess for Pulm HTN  Hypoxemia continues with little/no improvment

## 2017-05-15 NOTE — PLAN OF CARE
Problem: Occupational Therapy Goal  Goal: Occupational Therapy Goal  Goals to be met by: 05/22    Patient will increase functional independence with ADLs by performing:    UE Dressing with Supervision.  LE Dressing with Supervision with AD as needed.  Grooming while standing with Supervision.  Toileting from bedside commode with Supervision for hygiene and clothing management.   Stand pivot transfers with Supervision.  Toilet transfer to bedside commode with Supervision.      NISH Herring  5/15/2017

## 2017-05-15 NOTE — SUBJECTIVE & OBJECTIVE
Interval History:     Still coughing, still short of breath.  Does not feel he is improving much.      Review of Systems   Respiratory: Positive for cough and shortness of breath.    All other systems reviewed and are negative.    Objective:     Vital Signs (Most Recent):  Temp: 98 °F (36.7 °C) (05/15/17 1212)  Pulse: 96 (05/15/17 1212)  Resp: 20 (05/15/17 1212)  BP: 138/78 (05/15/17 1212)  SpO2: (!) 93 % (05/15/17 1212) Vital Signs (24h Range):  Temp:  [97.7 °F (36.5 °C)-98.6 °F (37 °C)] 98 °F (36.7 °C)  Pulse:  [] 96  Resp:  [18-38] 20  SpO2:  [90 %-100 %] 93 %  BP: (138-178)/(78-93) 138/78     Weight: 59.1 kg (130 lb 4.8 oz)  Body mass index is 21.03 kg/(m^2).    Intake/Output Summary (Last 24 hours) at 05/15/17 1406  Last data filed at 05/15/17 0000   Gross per 24 hour   Intake                0 ml   Output              950 ml   Net             -950 ml      Physical Exam   Constitutional: He appears well-nourished. No distress.   Cardiovascular: Normal rate.    Pulmonary/Chest: He is in respiratory distress.   Abdominal: Soft.   Neurological: He is alert.   Skin: Skin is warm.   Psychiatric: He has a normal mood and affect.   Nursing note and vitals reviewed.      Significant Labs:   BMP:   Recent Labs  Lab 05/14/17  0634 05/15/17  0435   GLU 87 144*   * 136   K 4.4 3.9   CL 96 98   CO2 25 29   BUN 10 10   CREATININE 0.9 0.8   CALCIUM 8.9 8.7   MG 1.6  --      CBC:   Recent Labs  Lab 05/14/17  0634 05/15/17  0435   WBC 5.21 7.12   HGB 16.3 14.8   HCT 46.3 44.2    163       Significant Imaging: None

## 2017-05-15 NOTE — PLAN OF CARE
Problem: Physical Therapy Goal  Goal: Physical Therapy Goal  Goals to be met by: 17     Patient will increase functional independence with mobility by performin. Supine to sit with Modified [place]  2. Sit to supine with Modified [place]  3. Sit to stand transfer with Supervision  4. Gait x 200 feet with Supervision using LRAD  5. Lower extremity exercise program x 30 reps per handout, with independence  Outcome: Ongoing (interventions implemented as appropriate)  PT eval complete and POC initiated.

## 2017-05-16 LAB
ANION GAP SERPL CALC-SCNC: 8 MMOL/L
BASOPHILS # BLD AUTO: 0.01 K/UL
BASOPHILS NFR BLD: 0.1 %
BUN SERPL-MCNC: 10 MG/DL
CALCIUM SERPL-MCNC: 8.8 MG/DL
CHLORIDE SERPL-SCNC: 101 MMOL/L
CO2 SERPL-SCNC: 31 MMOL/L
CREAT SERPL-MCNC: 0.7 MG/DL
DIFFERENTIAL METHOD: ABNORMAL
EOSINOPHIL # BLD AUTO: 0 K/UL
EOSINOPHIL NFR BLD: 0 %
ERYTHROCYTE [DISTWIDTH] IN BLOOD BY AUTOMATED COUNT: 17 %
EST. GFR  (AFRICAN AMERICAN): >60 ML/MIN/1.73 M^2
EST. GFR  (NON AFRICAN AMERICAN): >60 ML/MIN/1.73 M^2
ESTIMATED PA SYSTOLIC PRESSURE: 40.04
GLUCOSE SERPL-MCNC: 113 MG/DL
HCT VFR BLD AUTO: 44.4 %
HGB BLD-MCNC: 14.8 G/DL
LYMPHOCYTES # BLD AUTO: 0.5 K/UL
LYMPHOCYTES NFR BLD: 4.4 %
MCH RBC QN AUTO: 27.6 PG
MCHC RBC AUTO-ENTMCNC: 33.3 %
MCV RBC AUTO: 83 FL
MONOCYTES # BLD AUTO: 0.5 K/UL
MONOCYTES NFR BLD: 4.2 %
NEUTROPHILS # BLD AUTO: 10.3 K/UL
NEUTROPHILS NFR BLD: 90.9 %
PLATELET # BLD AUTO: 162 K/UL
PMV BLD AUTO: 10.5 FL
POCT GLUCOSE: 101 MG/DL (ref 70–110)
POCT GLUCOSE: 126 MG/DL (ref 70–110)
POCT GLUCOSE: 89 MG/DL (ref 70–110)
POTASSIUM SERPL-SCNC: 4 MMOL/L
RBC # BLD AUTO: 5.36 M/UL
RETIRED EF AND QEF - SEE NOTES: 55 (ref 55–65)
SODIUM SERPL-SCNC: 140 MMOL/L
TROPONIN I SERPL DL<=0.01 NG/ML-MCNC: 0.02 NG/ML
TROPONIN I SERPL DL<=0.01 NG/ML-MCNC: 0.02 NG/ML
TROPONIN I SERPL DL<=0.01 NG/ML-MCNC: 0.03 NG/ML
WBC # BLD AUTO: 11.3 K/UL

## 2017-05-16 PROCEDURE — 27000221 HC OXYGEN, UP TO 24 HOURS

## 2017-05-16 PROCEDURE — 84484 ASSAY OF TROPONIN QUANT: CPT | Mod: 91

## 2017-05-16 PROCEDURE — 25000242 PHARM REV CODE 250 ALT 637 W/ HCPCS: Performed by: STUDENT IN AN ORGANIZED HEALTH CARE EDUCATION/TRAINING PROGRAM

## 2017-05-16 PROCEDURE — 94640 AIRWAY INHALATION TREATMENT: CPT

## 2017-05-16 PROCEDURE — 93306 TTE W/DOPPLER COMPLETE: CPT

## 2017-05-16 PROCEDURE — 63600175 PHARM REV CODE 636 W HCPCS: Performed by: HOSPITALIST

## 2017-05-16 PROCEDURE — 85025 COMPLETE CBC W/AUTO DIFF WBC: CPT

## 2017-05-16 PROCEDURE — 25000003 PHARM REV CODE 250: Performed by: STUDENT IN AN ORGANIZED HEALTH CARE EDUCATION/TRAINING PROGRAM

## 2017-05-16 PROCEDURE — 36415 COLL VENOUS BLD VENIPUNCTURE: CPT

## 2017-05-16 PROCEDURE — 99233 SBSQ HOSP IP/OBS HIGH 50: CPT | Mod: ,,, | Performed by: INTERNAL MEDICINE

## 2017-05-16 PROCEDURE — 25000003 PHARM REV CODE 250: Performed by: INTERNAL MEDICINE

## 2017-05-16 PROCEDURE — 99233 SBSQ HOSP IP/OBS HIGH 50: CPT | Mod: ,,, | Performed by: HOSPITALIST

## 2017-05-16 PROCEDURE — 80048 BASIC METABOLIC PNL TOTAL CA: CPT

## 2017-05-16 PROCEDURE — 25000242 PHARM REV CODE 250 ALT 637 W/ HCPCS: Performed by: INTERNAL MEDICINE

## 2017-05-16 PROCEDURE — 11000001 HC ACUTE MED/SURG PRIVATE ROOM

## 2017-05-16 PROCEDURE — 93306 TTE W/DOPPLER COMPLETE: CPT | Mod: 26,,, | Performed by: INTERNAL MEDICINE

## 2017-05-16 PROCEDURE — 84484 ASSAY OF TROPONIN QUANT: CPT

## 2017-05-16 PROCEDURE — 25000003 PHARM REV CODE 250: Performed by: PHYSICIAN ASSISTANT

## 2017-05-16 PROCEDURE — 63600175 PHARM REV CODE 636 W HCPCS: Performed by: INTERNAL MEDICINE

## 2017-05-16 RX ORDER — TIOTROPIUM BROMIDE 18 UG/1
1 CAPSULE ORAL; RESPIRATORY (INHALATION) DAILY
Status: DISCONTINUED | OUTPATIENT
Start: 2017-05-16 | End: 2017-05-19 | Stop reason: HOSPADM

## 2017-05-16 RX ORDER — FLUTICASONE FUROATE AND VILANTEROL 200; 25 UG/1; UG/1
1 POWDER RESPIRATORY (INHALATION) DAILY
Status: DISCONTINUED | OUTPATIENT
Start: 2017-05-16 | End: 2017-05-19 | Stop reason: HOSPADM

## 2017-05-16 RX ADMIN — MOXIFLOXACIN HYDROCHLORIDE 400 MG: 400 TABLET, FILM COATED ORAL at 09:05

## 2017-05-16 RX ADMIN — Medication 3 ML: at 02:05

## 2017-05-16 RX ADMIN — ACETAMINOPHEN 650 MG: 325 TABLET ORAL at 09:05

## 2017-05-16 RX ADMIN — BENZONATATE 200 MG: 100 CAPSULE ORAL at 09:05

## 2017-05-16 RX ADMIN — IPRATROPIUM BROMIDE AND ALBUTEROL SULFATE 3 ML: .5; 3 SOLUTION RESPIRATORY (INHALATION) at 04:05

## 2017-05-16 RX ADMIN — Medication 3 ML: at 06:05

## 2017-05-16 RX ADMIN — HYDRALAZINE HYDROCHLORIDE 50 MG: 50 TABLET ORAL at 03:05

## 2017-05-16 RX ADMIN — IPRATROPIUM BROMIDE AND ALBUTEROL SULFATE 3 ML: .5; 3 SOLUTION RESPIRATORY (INHALATION) at 03:05

## 2017-05-16 RX ADMIN — BENAZEPRIL HYDROCHLORIDE 40 MG: 10 TABLET, FILM COATED ORAL at 09:05

## 2017-05-16 RX ADMIN — ACETAMINOPHEN 650 MG: 325 TABLET ORAL at 08:05

## 2017-05-16 RX ADMIN — LEVETIRACETAM 1000 MG: 500 TABLET, FILM COATED ORAL at 08:05

## 2017-05-16 RX ADMIN — STANDARDIZED SENNA CONCENTRATE AND DOCUSATE SODIUM 1 TABLET: 8.6; 5 TABLET, FILM COATED ORAL at 09:05

## 2017-05-16 RX ADMIN — METHYLPREDNISOLONE SODIUM SUCCINATE 60 MG: 40 INJECTION, POWDER, FOR SOLUTION INTRAMUSCULAR; INTRAVENOUS at 06:05

## 2017-05-16 RX ADMIN — TIOTROPIUM BROMIDE 18 MCG: 18 CAPSULE ORAL; RESPIRATORY (INHALATION) at 05:05

## 2017-05-16 RX ADMIN — LEVETIRACETAM 1000 MG: 500 TABLET, FILM COATED ORAL at 09:05

## 2017-05-16 RX ADMIN — HEPARIN SODIUM 5000 UNITS: 5000 INJECTION, SOLUTION INTRAVENOUS; SUBCUTANEOUS at 06:05

## 2017-05-16 RX ADMIN — IPRATROPIUM BROMIDE AND ALBUTEROL SULFATE 3 ML: .5; 3 SOLUTION RESPIRATORY (INHALATION) at 07:05

## 2017-05-16 RX ADMIN — HEPARIN SODIUM 5000 UNITS: 5000 INJECTION, SOLUTION INTRAVENOUS; SUBCUTANEOUS at 05:05

## 2017-05-16 RX ADMIN — STANDARDIZED SENNA CONCENTRATE AND DOCUSATE SODIUM 1 TABLET: 8.6; 5 TABLET, FILM COATED ORAL at 08:05

## 2017-05-16 RX ADMIN — Medication 3 ML: at 10:05

## 2017-05-16 RX ADMIN — IPRATROPIUM BROMIDE AND ALBUTEROL SULFATE 3 ML: .5; 3 SOLUTION RESPIRATORY (INHALATION) at 11:05

## 2017-05-16 RX ADMIN — BENZONATATE 200 MG: 100 CAPSULE ORAL at 08:05

## 2017-05-16 RX ADMIN — FLUTICASONE FUROATE AND VILANTEROL TRIFENATATE 1 PUFF: 200; 25 POWDER RESPIRATORY (INHALATION) at 05:05

## 2017-05-16 RX ADMIN — SODIUM CHLORIDE, SODIUM LACTATE, POTASSIUM CHLORIDE, AND CALCIUM CHLORIDE: .6; .31; .03; .02 INJECTION, SOLUTION INTRAVENOUS at 02:05

## 2017-05-16 RX ADMIN — METHYLPREDNISOLONE SODIUM SUCCINATE 60 MG: 40 INJECTION, POWDER, FOR SOLUTION INTRAMUSCULAR; INTRAVENOUS at 08:05

## 2017-05-16 RX ADMIN — GUAIFENESIN 200 MG: 100 SOLUTION ORAL at 09:05

## 2017-05-16 NOTE — PLAN OF CARE
Chronic Obstructive Pulmonary Disease (Adult)     Signs and Symptoms of Listed Potential Problems Will be Absent, Minimized or Managed (Chronic Obstructive Pulmonary Disease) Ongoing (interventions implemented as appropriate)        Fall Risk (Adult)     Identify Related Risk Factors and Signs and Symptoms Ongoing (interventions implemented as appropriate)     Absence of Falls Ongoing (interventions implemented as appropriate)        Patient Care Overview     Plan of Care Review Ongoing (interventions implemented as appropriate)     Individualization & Mutuality Ongoing (interventions implemented as appropriate)     Discharge Needs Assessment Ongoing (interventions implemented as appropriate)     Interdisciplinary Rounds/Family Conf Ongoing (interventions implemented as appropriate)

## 2017-05-16 NOTE — CONSULTS
Pulmonary Medicine  Initial Consultation    Reason for Consult: COPD Exacerbation  Requesting Physician: Renny Alexis MD  Consulting Provider: Uzair Anna MD    HPI:    Mr. Baltazar Mccray is a 56 y.o. male with a history of COPD only on PAULA therapy who presented to the hospital with dyspnea. He states that his symptoms started 2 days ago and he developed somewhat acute shortness of breath marked with wheezing and chest tightness. He states that he has also developed increased cough, though its been non-productive. He states that he had minimal improvement with his rescue inhaler therapy. On presentation, he was noted to by hypoxic and placed on O2 therapy. He denies fevers, chills, diaphoresis. He denies chest pain, pleuritic or otherwise, he denies leg pain.     Past Medical History:   Diagnosis Date    Asthma     Hypertension     Seizures      History reviewed. No pertinent surgical history.  Social History     Social History    Marital status: Single     Spouse name: N/A    Number of children: N/A    Years of education: N/A     Occupational History    Not on file.     Social History Main Topics    Smoking status: Current Every Day Smoker     Packs/day: 0.50    Smokeless tobacco: Not on file    Alcohol use No    Drug use: No    Sexual activity: Not on file     Other Topics Concern    Not on file     Social History Narrative     Review of patient's allergies indicates:  No Known Allergies    Review of Systems:  - A 10 point review of systems was obtained, pertinent positives are listed above    Objective:    Temp:  [97.8 °F (36.6 °C)-98.5 °F (36.9 °C)] 98.5 °F (36.9 °C)  Pulse:  [] 94  Resp:  [16-24] 24  SpO2:  [91 %-98 %] 94 %  BP: (135-180)/(78-98) 169/90    GEN: WD, thin, appears stated age, NAD, cooperative, appropriate  HEENT: NC/AT, PERRL, EOMI, no icterus/injection, no pharyngeal edema/erythema, MMM  NECK: Supple, trachea midline, no JVD, thyromegaly, no LAD  CVS: S1 and S2 audible,  RRR, no MRG appreciated  RESP: Decreased air movement in all fields, no wheezing  ABD: BSx4, soft, NT/ND, no masses, no HSM  EXT: Palpable and symmetric pulses, no CCE  SKIN: Warm, moist, no lesions, rashes, breakdown  NEURO: AAOx3, CNII-XII intact and symmetric, 5/5 strength in B/L UE and LE    CXR: Hyperinflation without focal infiltrates    Assessment and Recommendations    COPD with Exacerbation  Chronic Hypercapnia  Acute Hypoxia    - Unclear cause of exacerbation, no PNA and no PE noted   - Will add controller therapy with LABA-ICS (Breo) and LAMA (Spiriva), please continue on discharge  - SpO2 goal 88-92% considering chronic hypercapnia  - Patient has elevated BNP and was receiving continuous fluids; this was also likely contributing to hypoxia   - Consider diuresis if hypoxia persists   - Please refer to Pulmonary Clinic as outpatient    The patient was seen and examined with Dr. Anna and the assessment and plan as outlined above was discussed and confirmed at that time.     Shauna Patel MD  Fellow, [facility]  Pager: [phone]    5/16/2017  1:11 PM

## 2017-05-16 NOTE — PLAN OF CARE
Problem: Fall Risk (Adult)  Goal: Identify Related Risk Factors and Signs and Symptoms  Related risk factors and signs and symptoms are identified upon initiation of Human Response Clinical Practice Guideline (CPG)   Outcome: Ongoing (interventions implemented as appropriate)  Safety precautions in progress. Personal belongings and urinal in reach. Call light answered in person. Pt remained free from injury this shift.    Problem: Patient Care Overview  Goal: Plan of Care Review  Outcome: Ongoing (interventions implemented as appropriate)  Plan of care reviewed with pt this shift. Pt verbalizes understanding.

## 2017-05-16 NOTE — PLAN OF CARE
05/15/17 1751   Discharge Assessment   Assessment Type Discharge Planning Assessment   Confirmed/corrected address and phone number on facesheet? Yes  (patient lives in a boarding house; 1 of 4 renters)   Assessment information obtained from? Patient;Medical Record   Expected Length of Stay (days) 3   Communicated expected length of stay with patient/caregiver yes   Prior to hospitilization cognitive status: Alert/Oriented   Prior to hospitalization functional status: Independent   Current cognitive status: Alert/Oriented   Current Functional Status: Independent   Arrived From home or self-care   Lives With other (see comments)  (boarding home)   Able to Return to Prior Arrangements yes   Is patient able to care for self after discharge? Yes   Patient's perception of discharge disposition home or selfcare   Readmission Within The Last 30 Days no previous admission in last 30 days   Patient currently being followed by outpatient case management? No   Patient currently receives home health services? No   Does the patient currently use HME? No   Patient currently receives private duty nursing? No   Patient currently receives any other outside agency services? No   Equipment Currently Used at Home none   Do you have any problems affording any of your prescribed medications? No   Is the patient taking medications as prescribed? yes   Do you have any financial concerns preventing you from receiving the healthcare you need? No   Does the patient have transportation to healthcare appointments? Yes   Transportation Available public transportation  (patient takes the bus for everything and plans to take the bus home on discharge)   On Dialysis? No   Does the patient receive services at the Coumadin Clinic? No   Are there any open cases? No   Discharge Plan A Home   Patient/Family In Agreement With Plan yes

## 2017-05-16 NOTE — PLAN OF CARE
Problem: Patient Care Overview  Goal: Plan of Care Review  Outcome: Ongoing (interventions implemented as appropriate)  POC reviewed with pt. Pt verbalized understanding. Questions and concerns addressed.  No acute events today. Pt progressing toward goals. Will continue to monitor. See flowsheet for full assessment and vs info.

## 2017-05-17 LAB
ANION GAP SERPL CALC-SCNC: 10 MMOL/L
BASOPHILS # BLD AUTO: 0.01 K/UL
BASOPHILS NFR BLD: 0.1 %
BUN SERPL-MCNC: 10 MG/DL
CALCIUM SERPL-MCNC: 8.4 MG/DL
CHLORIDE SERPL-SCNC: 99 MMOL/L
CO2 SERPL-SCNC: 30 MMOL/L
CREAT SERPL-MCNC: 0.7 MG/DL
DIFFERENTIAL METHOD: ABNORMAL
EOSINOPHIL # BLD AUTO: 0 K/UL
EOSINOPHIL NFR BLD: 0 %
ERYTHROCYTE [DISTWIDTH] IN BLOOD BY AUTOMATED COUNT: 16.9 %
EST. GFR  (AFRICAN AMERICAN): >60 ML/MIN/1.73 M^2
EST. GFR  (NON AFRICAN AMERICAN): >60 ML/MIN/1.73 M^2
GLUCOSE SERPL-MCNC: 109 MG/DL
HCT VFR BLD AUTO: 42.3 %
HGB BLD-MCNC: 14.4 G/DL
LYMPHOCYTES # BLD AUTO: 0.5 K/UL
LYMPHOCYTES NFR BLD: 5.4 %
MCH RBC QN AUTO: 27.1 PG
MCHC RBC AUTO-ENTMCNC: 34 %
MCV RBC AUTO: 80 FL
MONOCYTES # BLD AUTO: 0.3 K/UL
MONOCYTES NFR BLD: 3.7 %
NEUTROPHILS # BLD AUTO: 8 K/UL
NEUTROPHILS NFR BLD: 90.5 %
PLATELET # BLD AUTO: 193 K/UL
PMV BLD AUTO: 10.9 FL
POTASSIUM SERPL-SCNC: 3.9 MMOL/L
RBC # BLD AUTO: 5.31 M/UL
SODIUM SERPL-SCNC: 139 MMOL/L
TROPONIN I SERPL DL<=0.01 NG/ML-MCNC: 0.02 NG/ML
TROPONIN I SERPL DL<=0.01 NG/ML-MCNC: 0.02 NG/ML
TROPONIN I SERPL DL<=0.01 NG/ML-MCNC: 0.03 NG/ML
WBC # BLD AUTO: 8.81 K/UL

## 2017-05-17 PROCEDURE — 25000242 PHARM REV CODE 250 ALT 637 W/ HCPCS: Performed by: INTERNAL MEDICINE

## 2017-05-17 PROCEDURE — 94761 N-INVAS EAR/PLS OXIMETRY MLT: CPT

## 2017-05-17 PROCEDURE — 80048 BASIC METABOLIC PNL TOTAL CA: CPT

## 2017-05-17 PROCEDURE — 84484 ASSAY OF TROPONIN QUANT: CPT

## 2017-05-17 PROCEDURE — 36415 COLL VENOUS BLD VENIPUNCTURE: CPT

## 2017-05-17 PROCEDURE — 25000003 PHARM REV CODE 250: Performed by: INTERNAL MEDICINE

## 2017-05-17 PROCEDURE — 25000003 PHARM REV CODE 250: Performed by: PHYSICIAN ASSISTANT

## 2017-05-17 PROCEDURE — 27100171 HC OXYGEN HIGH FLOW UP TO 24 HOURS

## 2017-05-17 PROCEDURE — 99233 SBSQ HOSP IP/OBS HIGH 50: CPT | Mod: ,,, | Performed by: HOSPITALIST

## 2017-05-17 PROCEDURE — 84484 ASSAY OF TROPONIN QUANT: CPT | Mod: 91

## 2017-05-17 PROCEDURE — 99900035 HC TECH TIME PER 15 MIN (STAT)

## 2017-05-17 PROCEDURE — 25000003 PHARM REV CODE 250: Performed by: STUDENT IN AN ORGANIZED HEALTH CARE EDUCATION/TRAINING PROGRAM

## 2017-05-17 PROCEDURE — 94640 AIRWAY INHALATION TREATMENT: CPT

## 2017-05-17 PROCEDURE — 63600175 PHARM REV CODE 636 W HCPCS: Performed by: HOSPITALIST

## 2017-05-17 PROCEDURE — 11000001 HC ACUTE MED/SURG PRIVATE ROOM

## 2017-05-17 PROCEDURE — 27000221 HC OXYGEN, UP TO 24 HOURS

## 2017-05-17 PROCEDURE — 85025 COMPLETE CBC W/AUTO DIFF WBC: CPT

## 2017-05-17 RX ADMIN — LEVETIRACETAM 1000 MG: 500 TABLET, FILM COATED ORAL at 08:05

## 2017-05-17 RX ADMIN — HEPARIN SODIUM 5000 UNITS: 5000 INJECTION, SOLUTION INTRAVENOUS; SUBCUTANEOUS at 06:05

## 2017-05-17 RX ADMIN — IPRATROPIUM BROMIDE AND ALBUTEROL SULFATE 3 ML: .5; 3 SOLUTION RESPIRATORY (INHALATION) at 11:05

## 2017-05-17 RX ADMIN — METHYLPREDNISOLONE SODIUM SUCCINATE 60 MG: 40 INJECTION, POWDER, FOR SOLUTION INTRAMUSCULAR; INTRAVENOUS at 08:05

## 2017-05-17 RX ADMIN — IPRATROPIUM BROMIDE AND ALBUTEROL SULFATE 3 ML: .5; 3 SOLUTION RESPIRATORY (INHALATION) at 07:05

## 2017-05-17 RX ADMIN — IPRATROPIUM BROMIDE AND ALBUTEROL SULFATE 3 ML: .5; 3 SOLUTION RESPIRATORY (INHALATION) at 09:05

## 2017-05-17 RX ADMIN — BENZONATATE 200 MG: 100 CAPSULE ORAL at 08:05

## 2017-05-17 RX ADMIN — HYDRALAZINE HYDROCHLORIDE 50 MG: 50 TABLET ORAL at 04:05

## 2017-05-17 RX ADMIN — TIOTROPIUM BROMIDE 18 MCG: 18 CAPSULE ORAL; RESPIRATORY (INHALATION) at 08:05

## 2017-05-17 RX ADMIN — ACETAMINOPHEN 650 MG: 325 TABLET ORAL at 08:05

## 2017-05-17 RX ADMIN — BENAZEPRIL HYDROCHLORIDE 40 MG: 10 TABLET, FILM COATED ORAL at 08:05

## 2017-05-17 RX ADMIN — MOXIFLOXACIN HYDROCHLORIDE 400 MG: 400 TABLET, FILM COATED ORAL at 08:05

## 2017-05-17 RX ADMIN — Medication 3 ML: at 02:05

## 2017-05-17 RX ADMIN — HEPARIN SODIUM 5000 UNITS: 5000 INJECTION, SOLUTION INTRAVENOUS; SUBCUTANEOUS at 09:05

## 2017-05-17 RX ADMIN — STANDARDIZED SENNA CONCENTRATE AND DOCUSATE SODIUM 1 TABLET: 8.6; 5 TABLET, FILM COATED ORAL at 08:05

## 2017-05-17 RX ADMIN — HYDRALAZINE HYDROCHLORIDE 50 MG: 50 TABLET ORAL at 09:05

## 2017-05-17 RX ADMIN — Medication 3 ML: at 06:05

## 2017-05-17 RX ADMIN — IPRATROPIUM BROMIDE AND ALBUTEROL SULFATE 3 ML: .5; 3 SOLUTION RESPIRATORY (INHALATION) at 03:05

## 2017-05-17 NOTE — SUBJECTIVE & OBJECTIVE
Interval History: Patient seen and examined at bedside. Still complaining of cough. Seen by Pulmonology who added Breo and spiriva.     Review of Systems   Constitutional: Negative.    HENT: Negative.    Eyes: Negative.    Respiratory: Positive for cough, shortness of breath and wheezing.    Cardiovascular: Negative.    Gastrointestinal: Negative.    Genitourinary: Negative.    Musculoskeletal: Negative.    Skin: Negative.    Neurological: Negative.    Psychiatric/Behavioral: Negative.      Objective:     Vital Signs (Most Recent):  Temp: 99.9 °F (37.7 °C) (05/16/17 1954)  Pulse: 108 (05/16/17 1954)  Resp: 20 (05/16/17 1954)  BP: (!) 176/104 (05/16/17 1954)  SpO2: (!) 90 % (05/16/17 1954) Vital Signs (24h Range):  Temp:  [97.2 °F (36.2 °C)-99.9 °F (37.7 °C)] 99.9 °F (37.7 °C)  Pulse:  [] 108  Resp:  [18-24] 20  SpO2:  [88 %-98 %] 90 %  BP: (160-180)/() 176/104     Weight: 59.1 kg (130 lb 4.8 oz)  Body mass index is 21.03 kg/(m^2).    Intake/Output Summary (Last 24 hours) at 05/16/17 2105  Last data filed at 05/16/17 1800   Gross per 24 hour   Intake                0 ml   Output             1320 ml   Net            -1320 ml      Physical Exam   Constitutional: He is oriented to person, place, and time. He appears well-nourished.   HENT:   Head: Normocephalic and atraumatic.   Eyes: EOM are normal. Pupils are equal, round, and reactive to light.   Neck: Normal range of motion.   Cardiovascular: Normal rate and regular rhythm.    Pulmonary/Chest: He has wheezes.   Abdominal: Soft. Bowel sounds are normal.   Musculoskeletal: Normal range of motion.   Neurological: He is oriented to person, place, and time.   Skin: Skin is warm and dry.       Significant Labs:   CBC:   Recent Labs  Lab 05/15/17  0435 05/16/17  0411   WBC 7.12 11.30   HGB 14.8 14.8   HCT 44.2 44.4    162     CMP:   Recent Labs  Lab 05/15/17  0435 05/16/17  0411    140   K 3.9 4.0   CL 98 101   CO2 29 31*   * 113*   BUN 10  10   CREATININE 0.8 0.7   CALCIUM 8.7 8.8   ANIONGAP 9 8   EGFRNONAA >60.0 >60.0       Significant Imaging: I have reviewed all pertinent imaging results/findings within the past 24 hours.

## 2017-05-17 NOTE — PROGRESS NOTES
[facility] Hospital Medicine  Progress Note    Patient Name: Baltazar Mccray  MRN: [id number]  Patient Class: IP- Inpatient   Admission Date: 5/14/2017  Length of Stay: 2 days  Attending Physician: Renny Alexis MD  Primary Care Provider: Primary Doctor [facility] Medicine Team: [facility]  Renny Alexis MD    Subjective:     Principal Problem: COPD exacerbation  HPI:  Patient began experiencing shortness of breath and coughing yesterday afternoon and has tried to use his inhaler at home with poor results.  He presented to the ED today and was given nebs and abx.  He says his symptoms have improved somewhat.  He says he thinks this is consistent with his previous asthma, copd flares.  He says he has continued to smoke daily.  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  He says he has not had any leg swelling or pain.      Hospital Course:  Admitted and started on IV steroids, LABA, and Long acting anticholinergic, Seen by Pulmonology.     Interval History: Patient seen and examined at bedside. Still complaining of cough. Seen by Pulmonology who added Breo and spiriva.     Review of Systems   Constitutional: Negative.    HENT: Negative.    Eyes: Negative.    Respiratory: Positive for cough, shortness of breath and wheezing.    Cardiovascular: Negative.    Gastrointestinal: Negative.    Genitourinary: Negative.    Musculoskeletal: Negative.    Skin: Negative.    Neurological: Negative.    Psychiatric/Behavioral: Negative.      Objective:     Vital Signs (Most Recent):  Temp: 99.9 °F (37.7 °C) (05/16/17 1954)  Pulse: 108 (05/16/17 1954)  Resp: 20 (05/16/17 1954)  BP: (!) 176/104 (05/16/17 1954)  SpO2: (!) 90 % (05/16/17 1954) Vital Signs (24h Range):  Temp:  [97.2 °F (36.2 °C)-99.9 °F (37.7 °C)] 99.9 °F (37.7 °C)  Pulse:  [] 108  Resp:  [18-24] 20  SpO2:  [88 %-98 %] 90 %  BP: (160-180)/() 176/104     Weight: 59.1 kg (130 lb 4.8 oz)  Body mass index is 21.03  kg/(m^2).    Intake/Output Summary (Last 24 hours) at 05/16/17 2105  Last data filed at 05/16/17 1800   Gross per 24 hour   Intake                0 ml   Output             1320 ml   Net            -1320 ml      Physical Exam   Constitutional: He is oriented to person, place, and time. He appears well-nourished.   HENT:   Head: Normocephalic and atraumatic.   Eyes: EOM are normal. Pupils are equal, round, and reactive to light.   Neck: Normal range of motion.   Cardiovascular: Normal rate and regular rhythm.    Pulmonary/Chest: He has wheezes.   Abdominal: Soft. Bowel sounds are normal.   Musculoskeletal: Normal range of motion.   Neurological: He is oriented to person, place, and time.   Skin: Skin is warm and dry.       Significant Labs:   CBC:   Recent Labs  Lab 05/15/17  0435 05/16/17 0411   WBC 7.12 11.30   HGB 14.8 14.8   HCT 44.2 44.4    162     CMP:   Recent Labs  Lab 05/15/17  0435 05/16/17 0411    140   K 3.9 4.0   CL 98 101   CO2 29 31*   * 113*   BUN 10 10   CREATININE 0.8 0.7   CALCIUM 8.7 8.8   ANIONGAP 9 8   EGFRNONAA >60.0 >60.0       Significant Imaging: I have reviewed all pertinent imaging results/findings within the past 24 hours.    Assessment/Plan:   COPD exacerbation  Pathway started  Continue Nebs, steroids  Moxifloxacin IV  CT negative for PE  Pulm Consulted, started on BREO and Spriva  Cont Steroids, wean to BID   Elevated PA pressures seen on TTE with doppler    Seizure disorder  Cont keppra 1000mg BID    HTN  Cont Benazepril      VTE Risk Mitigation         Ordered     heparin (porcine) injection 5,000 Units  Every 8 hours     Route:  Subcutaneous        05/14/17 1621     Medium Risk of VTE  Once      05/14/17 1621     Place JORJE hose  Until discontinued      05/14/17 1621     Place sequential compression device  Until discontinued      05/14/17 1621          Renny Alexis MD  Department of Hospital Medicine   [facility]

## 2017-05-18 LAB
ANION GAP SERPL CALC-SCNC: 8 MMOL/L
BASOPHILS # BLD AUTO: 0.01 K/UL
BASOPHILS NFR BLD: 0.2 %
BUN SERPL-MCNC: 16 MG/DL
CALCIUM SERPL-MCNC: 8.8 MG/DL
CHLORIDE SERPL-SCNC: 99 MMOL/L
CO2 SERPL-SCNC: 33 MMOL/L
CREAT SERPL-MCNC: 0.9 MG/DL
DIFFERENTIAL METHOD: ABNORMAL
EOSINOPHIL # BLD AUTO: 0 K/UL
EOSINOPHIL NFR BLD: 0 %
ERYTHROCYTE [DISTWIDTH] IN BLOOD BY AUTOMATED COUNT: 16.9 %
EST. GFR  (AFRICAN AMERICAN): >60 ML/MIN/1.73 M^2
EST. GFR  (NON AFRICAN AMERICAN): >60 ML/MIN/1.73 M^2
GLUCOSE SERPL-MCNC: 131 MG/DL
HCT VFR BLD AUTO: 43.5 %
HGB BLD-MCNC: 14.7 G/DL
LYMPHOCYTES # BLD AUTO: 0.6 K/UL
LYMPHOCYTES NFR BLD: 9.4 %
MCH RBC QN AUTO: 27 PG
MCHC RBC AUTO-ENTMCNC: 33.8 %
MCV RBC AUTO: 80 FL
MONOCYTES # BLD AUTO: 0.1 K/UL
MONOCYTES NFR BLD: 2.1 %
NEUTROPHILS # BLD AUTO: 5.8 K/UL
NEUTROPHILS NFR BLD: 88 %
PLATELET # BLD AUTO: 185 K/UL
PMV BLD AUTO: 10.6 FL
POCT GLUCOSE: 145 MG/DL (ref 70–110)
POCT GLUCOSE: 174 MG/DL (ref 70–110)
POCT GLUCOSE: 180 MG/DL (ref 70–110)
POCT GLUCOSE: 87 MG/DL (ref 70–110)
POTASSIUM SERPL-SCNC: 4 MMOL/L
RBC # BLD AUTO: 5.45 M/UL
SODIUM SERPL-SCNC: 140 MMOL/L
TROPONIN I SERPL DL<=0.01 NG/ML-MCNC: 0.01 NG/ML
TROPONIN I SERPL DL<=0.01 NG/ML-MCNC: 0.02 NG/ML
WBC # BLD AUTO: 6.58 K/UL

## 2017-05-18 PROCEDURE — 25000242 PHARM REV CODE 250 ALT 637 W/ HCPCS: Performed by: INTERNAL MEDICINE

## 2017-05-18 PROCEDURE — 25000003 PHARM REV CODE 250: Performed by: INTERNAL MEDICINE

## 2017-05-18 PROCEDURE — 99233 SBSQ HOSP IP/OBS HIGH 50: CPT | Mod: ,,, | Performed by: HOSPITALIST

## 2017-05-18 PROCEDURE — 80048 BASIC METABOLIC PNL TOTAL CA: CPT

## 2017-05-18 PROCEDURE — 27000221 HC OXYGEN, UP TO 24 HOURS

## 2017-05-18 PROCEDURE — 25000242 PHARM REV CODE 250 ALT 637 W/ HCPCS: Performed by: STUDENT IN AN ORGANIZED HEALTH CARE EDUCATION/TRAINING PROGRAM

## 2017-05-18 PROCEDURE — 36415 COLL VENOUS BLD VENIPUNCTURE: CPT

## 2017-05-18 PROCEDURE — 63600175 PHARM REV CODE 636 W HCPCS: Performed by: HOSPITALIST

## 2017-05-18 PROCEDURE — 84484 ASSAY OF TROPONIN QUANT: CPT

## 2017-05-18 PROCEDURE — 11000001 HC ACUTE MED/SURG PRIVATE ROOM

## 2017-05-18 PROCEDURE — 85025 COMPLETE CBC W/AUTO DIFF WBC: CPT

## 2017-05-18 PROCEDURE — 25000003 PHARM REV CODE 250: Performed by: STUDENT IN AN ORGANIZED HEALTH CARE EDUCATION/TRAINING PROGRAM

## 2017-05-18 PROCEDURE — 94640 AIRWAY INHALATION TREATMENT: CPT

## 2017-05-18 PROCEDURE — 94761 N-INVAS EAR/PLS OXIMETRY MLT: CPT

## 2017-05-18 RX ORDER — PREDNISONE 20 MG/1
60 TABLET ORAL DAILY
Status: DISCONTINUED | OUTPATIENT
Start: 2017-05-18 | End: 2017-05-19 | Stop reason: HOSPADM

## 2017-05-18 RX ADMIN — STANDARDIZED SENNA CONCENTRATE AND DOCUSATE SODIUM 1 TABLET: 8.6; 5 TABLET, FILM COATED ORAL at 09:05

## 2017-05-18 RX ADMIN — HEPARIN SODIUM 5000 UNITS: 5000 INJECTION, SOLUTION INTRAVENOUS; SUBCUTANEOUS at 06:05

## 2017-05-18 RX ADMIN — HEPARIN SODIUM 5000 UNITS: 5000 INJECTION, SOLUTION INTRAVENOUS; SUBCUTANEOUS at 09:05

## 2017-05-18 RX ADMIN — Medication 3 ML: at 06:05

## 2017-05-18 RX ADMIN — FLUTICASONE FUROATE AND VILANTEROL TRIFENATATE 1 PUFF: 200; 25 POWDER RESPIRATORY (INHALATION) at 11:05

## 2017-05-18 RX ADMIN — LEVETIRACETAM 1000 MG: 500 TABLET, FILM COATED ORAL at 09:05

## 2017-05-18 RX ADMIN — BENZONATATE 200 MG: 100 CAPSULE ORAL at 08:05

## 2017-05-18 RX ADMIN — Medication 3 ML: at 09:05

## 2017-05-18 RX ADMIN — IPRATROPIUM BROMIDE AND ALBUTEROL SULFATE 3 ML: .5; 3 SOLUTION RESPIRATORY (INHALATION) at 07:05

## 2017-05-18 RX ADMIN — TIOTROPIUM BROMIDE 18 MCG: 18 CAPSULE ORAL; RESPIRATORY (INHALATION) at 11:05

## 2017-05-18 RX ADMIN — STANDARDIZED SENNA CONCENTRATE AND DOCUSATE SODIUM 1 TABLET: 8.6; 5 TABLET, FILM COATED ORAL at 08:05

## 2017-05-18 RX ADMIN — BENAZEPRIL HYDROCHLORIDE 40 MG: 10 TABLET, FILM COATED ORAL at 09:05

## 2017-05-18 RX ADMIN — IPRATROPIUM BROMIDE AND ALBUTEROL SULFATE 3 ML: .5; 3 SOLUTION RESPIRATORY (INHALATION) at 12:05

## 2017-05-18 RX ADMIN — MOXIFLOXACIN HYDROCHLORIDE 400 MG: 400 TABLET, FILM COATED ORAL at 09:05

## 2017-05-18 RX ADMIN — IPRATROPIUM BROMIDE AND ALBUTEROL SULFATE 3 ML: .5; 3 SOLUTION RESPIRATORY (INHALATION) at 03:05

## 2017-05-18 RX ADMIN — HEPARIN SODIUM 5000 UNITS: 5000 INJECTION, SOLUTION INTRAVENOUS; SUBCUTANEOUS at 02:05

## 2017-05-18 RX ADMIN — IPRATROPIUM BROMIDE AND ALBUTEROL SULFATE 3 ML: .5; 3 SOLUTION RESPIRATORY (INHALATION) at 08:05

## 2017-05-18 RX ADMIN — LEVETIRACETAM 1000 MG: 500 TABLET, FILM COATED ORAL at 08:05

## 2017-05-18 RX ADMIN — METHYLPREDNISOLONE SODIUM SUCCINATE 60 MG: 40 INJECTION, POWDER, FOR SOLUTION INTRAMUSCULAR; INTRAVENOUS at 09:05

## 2017-05-18 NOTE — PROGRESS NOTES
[facility] Hospital Medicine  Progress Note    Patient Name: Baltazar Mccray  MRN: [id number]  Patient Class: IP- Inpatient   Admission Date: 5/14/2017  Length of Stay: 3 days  Attending Physician: Renny Alexis MD  Primary Care Provider: Primary Doctor [facility] Medicine Team: [facility]  Renny Alexis MD    Subjective:     Principal Problem:COPD exacerbation    HPI:  Patient began experiencing shortness of breath and coughing yesterday afternoon and has tried to use his inhaler at home with poor results.  He presented to the ED today and was given nebs and abx.  He says his symptoms have improved somewhat.  He says he thinks this is consistent with his previous asthma, copd flares.  He says he has continued to smoke daily.  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  He says he has not had any leg swelling or pain.      Hospital Course:  Admitted and started on IV steroids, LABA, and Long acting anticholinergic, Seen by Pulmonology. Still hypoxemic on room air.     Interval History: Patient seen and examined at bedside.Still hypoxemic on room air, discharge held.     Review of Systems   Constitutional: Negative.    HENT: Negative.    Eyes: Negative.    Respiratory: Positive for cough, shortness of breath and wheezing.    Cardiovascular: Negative.    Gastrointestinal: Negative.    Genitourinary: Negative.    Musculoskeletal: Negative.    Skin: Negative.    Neurological: Negative.    Psychiatric/Behavioral: Negative.      Objective:     Vital Signs (Most Recent):  Temp: 98.2 °F (36.8 °C) (05/17/17 1647)  Pulse: 77 (05/17/17 1647)  Resp: 18 (05/17/17 1647)  BP: (!) 178/100 (05/17/17 1647)  SpO2: (!) 92 % (05/17/17 1647) Vital Signs (24h Range):  Temp:  [97.5 °F (36.4 °C)-99.9 °F (37.7 °C)] 98.2 °F (36.8 °C)  Pulse:  [] 77  Resp:  [16-20] 18  SpO2:  [88 %-100 %] 92 %  BP: (162-184)/() 178/100     Weight: 59.1 kg (130 lb 4.8 oz)  Body mass index is 21.03  kg/(m^2).  No intake or output data in the 24 hours ending 05/17/17 1920   Physical Exam   Constitutional: He is oriented to person, place, and time. He appears well-nourished.   HENT:   Head: Normocephalic and atraumatic.   Eyes: EOM are normal. Pupils are equal, round, and reactive to light.   Neck: Normal range of motion.   Cardiovascular: Normal rate and regular rhythm.    Pulmonary/Chest: He has wheezes.   Abdominal: Soft. Bowel sounds are normal.   Musculoskeletal: Normal range of motion.   Neurological: He is oriented to person, place, and time.   Skin: Skin is warm and dry.       Significant Labs:   CBC:     Recent Labs  Lab 05/16/17 0411 05/17/17 0518   WBC 11.30 8.81   HGB 14.8 14.4   HCT 44.4 42.3    193     CMP:     Recent Labs  Lab 05/16/17 0411 05/17/17 0518    139   K 4.0 3.9    99   CO2 31* 30*   * 109   BUN 10 10   CREATININE 0.7 0.7   CALCIUM 8.8 8.4*   ANIONGAP 8 10   EGFRNONAA >60.0 >60.0       Significant Imaging: I have reviewed all pertinent imaging results/findings within the past 24 hours.    Assessment/Plan:   COPD exacerbation  Pathway started  Continue Nebs, steroids  Moxifloxacin IV  CT negative for PE  Pulm Consulted, started on BREO and Spriva  Cont Steroids, wean to BID   Elevated PA pressures seen on TTE with doppler     Seizure disorder  Cont keppra 1000mg BID     HTN  Cont Benazepril        VTE Risk Mitigation         Ordered     heparin (porcine) injection 5,000 Units  Every 8 hours     Route:  Subcutaneous        05/14/17 1621     Medium Risk of VTE  Once      05/14/17 1621     Place JORJE hose  Until discontinued      05/14/17 1621     Place sequential compression device  Until discontinued      05/14/17 1621          Renny Alexis MD  Department of Hospital Medicine   [facility]

## 2017-05-18 NOTE — SUBJECTIVE & OBJECTIVE
Interval History: Patient seen and examined at bedside.Still hypoxemic on room air, discharge held.     Review of Systems   Constitutional: Negative.    HENT: Negative.    Eyes: Negative.    Respiratory: Positive for cough, shortness of breath and wheezing.    Cardiovascular: Negative.    Gastrointestinal: Negative.    Genitourinary: Negative.    Musculoskeletal: Negative.    Skin: Negative.    Neurological: Negative.    Psychiatric/Behavioral: Negative.      Objective:     Vital Signs (Most Recent):  Temp: 98.2 °F (36.8 °C) (05/17/17 1647)  Pulse: 77 (05/17/17 1647)  Resp: 18 (05/17/17 1647)  BP: (!) 178/100 (05/17/17 1647)  SpO2: (!) 92 % (05/17/17 1647) Vital Signs (24h Range):  Temp:  [97.5 °F (36.4 °C)-99.9 °F (37.7 °C)] 98.2 °F (36.8 °C)  Pulse:  [] 77  Resp:  [16-20] 18  SpO2:  [88 %-100 %] 92 %  BP: (162-184)/() 178/100     Weight: 59.1 kg (130 lb 4.8 oz)  Body mass index is 21.03 kg/(m^2).  No intake or output data in the 24 hours ending 05/17/17 1920   Physical Exam   Constitutional: He is oriented to person, place, and time. He appears well-nourished.   HENT:   Head: Normocephalic and atraumatic.   Eyes: EOM are normal. Pupils are equal, round, and reactive to light.   Neck: Normal range of motion.   Cardiovascular: Normal rate and regular rhythm.    Pulmonary/Chest: He has wheezes.   Abdominal: Soft. Bowel sounds are normal.   Musculoskeletal: Normal range of motion.   Neurological: He is oriented to person, place, and time.   Skin: Skin is warm and dry.       Significant Labs:   CBC:     Recent Labs  Lab 05/16/17 0411 05/17/17 0518   WBC 11.30 8.81   HGB 14.8 14.4   HCT 44.4 42.3    193     CMP:     Recent Labs  Lab 05/16/17 0411 05/17/17 0518    139   K 4.0 3.9    99   CO2 31* 30*   * 109   BUN 10 10   CREATININE 0.7 0.7   CALCIUM 8.8 8.4*   ANIONGAP 8 10   EGFRNONAA >60.0 >60.0       Significant Imaging: I have reviewed all pertinent imaging results/findings  within the past 24 hours.

## 2017-05-18 NOTE — SUBJECTIVE & OBJECTIVE
Interval History: Patient seen and examined at bedside. Still hypoxemic and requiring oxygen with a pulse ox of 84% on room air.     Review of Systems   Constitutional: Negative.    HENT: Negative.    Eyes: Negative.    Respiratory: Positive for cough, shortness of breath and wheezing.    Cardiovascular: Negative.    Gastrointestinal: Negative.    Genitourinary: Negative.    Musculoskeletal: Negative.    Skin: Negative.    Neurological: Negative.    Psychiatric/Behavioral: Negative.      Objective:     Vital Signs (Most Recent):  Temp: 97.9 °F (36.6 °C) (05/18/17 1509)  Pulse: 72 (05/18/17 1600)  Resp: 16 (05/18/17 1545)  BP: (!) 175/92 (05/18/17 1509)  SpO2: (!) 85 % (05/18/17 1600) Vital Signs (24h Range):  Temp:  [97.2 °F (36.2 °C)-98.7 °F (37.1 °C)] 97.9 °F (36.6 °C)  Pulse:  [] 72  Resp:  [15-18] 16  SpO2:  [84 %-97 %] 85 %  BP: (156-188)/(90-96) 175/92     Weight: 59.1 kg (130 lb 4.8 oz)  Body mass index is 21.03 kg/(m^2).    Intake/Output Summary (Last 24 hours) at 05/18/17 1826  Last data filed at 05/18/17 1700   Gross per 24 hour   Intake              670 ml   Output                0 ml   Net              670 ml      Physical Exam   Constitutional: He is oriented to person, place, and time. He appears well-nourished.   HENT:   Head: Normocephalic and atraumatic.   Eyes: EOM are normal. Pupils are equal, round, and reactive to light.   Neck: Normal range of motion.   Cardiovascular: Normal rate and regular rhythm.    Pulmonary/Chest: He has wheezes.   Abdominal: Soft. Bowel sounds are normal.   Musculoskeletal: Normal range of motion.   Neurological: He is oriented to person, place, and time.   Skin: Skin is warm and dry.       Significant Labs:   CBC:     Recent Labs  Lab 05/17/17  0518 05/18/17  0410   WBC 8.81 6.58   HGB 14.4 14.7   HCT 42.3 43.5    185     CMP:     Recent Labs  Lab 05/17/17  0518 05/18/17  0410    140   K 3.9 4.0   CL 99 99   CO2 30* 33*    131*   BUN 10 16    CREATININE 0.7 0.9   CALCIUM 8.4* 8.8   ANIONGAP 10 8   EGFRNONAA >60.0 >60.0       Significant Imaging: I have reviewed all pertinent imaging results/findings within the past 24 hours.

## 2017-05-18 NOTE — PROGRESS NOTES
Spoke with Dr. Hunt and notified him that pt's O2 sat on room air is 84 % , he order to put the pt on O2 1 L N/C and keep O2 sat between 88-90 %.

## 2017-05-18 NOTE — PROGRESS NOTES
[facility] Hospital Medicine  Progress Note    Patient Name: Baltazar Mccray  MRN: [id number]  Patient Class: IP- Inpatient   Admission Date: 5/14/2017  Length of Stay: 4 days  Attending Physician: Renny Alexis MD  Primary Care Provider: Primary Doctor [facility] Medicine Team: [facility]  Renny Alexis MD    Subjective:     Principal Problem:COPD exacerbation    HPI:  Patient began experiencing shortness of breath and coughing yesterday afternoon and has tried to use his inhaler at home with poor results.  He presented to the ED today and was given nebs and abx.  He says his symptoms have improved somewhat.  He says he thinks this is consistent with his previous asthma, copd flares.  He says he has continued to smoke daily.  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  He says he has not had any leg swelling or pain.      Hospital Course:  Admitted and started on IV steroids, LABA, and Long acting anticholinergic, Seen by Pulmonology. Still hypoxemic on room air.     Interval History: Patient seen and examined at bedside. Still hypoxemic and requiring oxygen with a pulse ox of 84% on room air.     Review of Systems   Constitutional: Negative.    HENT: Negative.    Eyes: Negative.    Respiratory: Positive for cough, shortness of breath and wheezing.    Cardiovascular: Negative.    Gastrointestinal: Negative.    Genitourinary: Negative.    Musculoskeletal: Negative.    Skin: Negative.    Neurological: Negative.    Psychiatric/Behavioral: Negative.      Objective:     Vital Signs (Most Recent):  Temp: 97.9 °F (36.6 °C) (05/18/17 1509)  Pulse: 72 (05/18/17 1600)  Resp: 16 (05/18/17 1545)  BP: (!) 175/92 (05/18/17 1509)  SpO2: (!) 85 % (05/18/17 1600) Vital Signs (24h Range):  Temp:  [97.2 °F (36.2 °C)-98.7 °F (37.1 °C)] 97.9 °F (36.6 °C)  Pulse:  [] 72  Resp:  [15-18] 16  SpO2:  [84 %-97 %] 85 %  BP: (156-188)/(90-96) 175/92     Weight: 59.1 kg (130 lb 4.8 oz)  Body mass  index is 21.03 kg/(m^2).    Intake/Output Summary (Last 24 hours) at 05/18/17 1826  Last data filed at 05/18/17 1700   Gross per 24 hour   Intake              670 ml   Output                0 ml   Net              670 ml      Physical Exam   Constitutional: He is oriented to person, place, and time. He appears well-nourished.   HENT:   Head: Normocephalic and atraumatic.   Eyes: EOM are normal. Pupils are equal, round, and reactive to light.   Neck: Normal range of motion.   Cardiovascular: Normal rate and regular rhythm.    Pulmonary/Chest: He has wheezes.   Abdominal: Soft. Bowel sounds are normal.   Musculoskeletal: Normal range of motion.   Neurological: He is oriented to person, place, and time.   Skin: Skin is warm and dry.       Significant Labs:   CBC:     Recent Labs  Lab 05/17/17  0518 05/18/17  0410   WBC 8.81 6.58   HGB 14.4 14.7   HCT 42.3 43.5    185     CMP:     Recent Labs  Lab 05/17/17  0518 05/18/17  0410    140   K 3.9 4.0   CL 99 99   CO2 30* 33*    131*   BUN 10 16   CREATININE 0.7 0.9   CALCIUM 8.4* 8.8   ANIONGAP 10 8   EGFRNONAA >60.0 >60.0       Significant Imaging: I have reviewed all pertinent imaging results/findings within the past 24 hours.    Assessment/Plan:   COPD exacerbation  Pathway started  Continue Nebs, steroids  Moxifloxacin IV  CT negative for PE  Pulm Consulted, started on BREO and Spriva  Cont Steroids, wean to BID   Elevated PA pressures seen on TTE with doppler      Seizure disorder  Cont keppra 1000mg BID      HTN  Cont Benazepril    VTE Risk Mitigation         Ordered     heparin (porcine) injection 5,000 Units  Every 8 hours     Route:  Subcutaneous        05/14/17 1621     Medium Risk of VTE  Once      05/14/17 1621     Place JORJE hose  Until discontinued      05/14/17 1621     Place sequential compression device  Until discontinued      05/14/17 1621          Renny Alexis MD  Department of Hospital Medicine   [facility]

## 2017-05-18 NOTE — PLAN OF CARE
Problem: Fall Risk (Adult)  Goal: Absence of Falls  Patient will demonstrate the desired outcomes by discharge/transition of care.   Outcome: Ongoing (interventions implemented as appropriate)  Pt is free of falls and injuries per shift , fall precautions maintained and bed alarm on .     Problem: Patient Care Overview  Goal: Plan of Care Review  Outcome: Ongoing (interventions implemented as appropriate)  Pt is awake ,alert and oriented x 4 . Stable V/S . No C/O pain during the shift . Pt slept between care . BS monitored before meals no  Need for insulin on sliding scale , pt is free of S/S of hypo/hyperglycemia .     Problem: Chronic Obstructive Pulmonary Disease (Adult)  Goal: Signs and Symptoms of Listed Potential Problems Will be Absent, Minimized or Managed (Chronic Obstructive Pulmonary Disease)  Signs and symptoms of listed potential problems will be absent, minimized or managed by discharge/transition of care (reference Chronic Obstructive Pulmonary Disease (Adult) CPG).   Outcome: Ongoing (interventions implemented as appropriate)  Pt on O2 N/C 1 L and O2 sat is 88-90 % . Pt's O2 drops to 84 % on room air at rest . Pt on scheduled breathing treatment . Medications given as scheduled .

## 2017-05-18 NOTE — PROGRESS NOTES
Home Oxygen Evaluation    Date Performed: 5/18/2017    1) Patient's Home O2 Sat on room air, while at rest: 84 %        If O2 sats on room air at rest are 88% or below, patient qualifies. No additional testing needed. Document N/A in steps 2 and 3. If 89% or above, complete steps 2.      2) Patient's O2 Sat on room air while exercising: N/A        If O2 sats on room air while exercising remain 89% or above patient does not qualify, no further testing needed Document N/A in step 3. If O2 sats on room air while exercising are 88% or below, continue to step 3.      3) Patient's O2 Sat while exercising on O2: N/A  At N/A  LPM         (Must show improvement from #2 for patients to qualify)    If O2 sats improve on oxygen, patient qualifies for portable oxygen. If not, the patient does not qualify.

## 2017-05-19 VITALS
SYSTOLIC BLOOD PRESSURE: 160 MMHG | DIASTOLIC BLOOD PRESSURE: 84 MMHG | RESPIRATION RATE: 18 BRPM | OXYGEN SATURATION: 92 % | TEMPERATURE: 98 F | BODY MASS INDEX: 20.94 KG/M2 | WEIGHT: 130.31 LBS | HEART RATE: 98 BPM | HEIGHT: 66 IN

## 2017-05-19 PROBLEM — R91.1 LUNG NODULE: Status: ACTIVE | Noted: 2017-05-19

## 2017-05-19 PROBLEM — I10 ESSENTIAL HYPERTENSION: Status: ACTIVE | Noted: 2017-05-19

## 2017-05-19 LAB
ANION GAP SERPL CALC-SCNC: 10 MMOL/L
BACTERIA BLD CULT: NORMAL
BACTERIA BLD CULT: NORMAL
BASOPHILS # BLD AUTO: 0.01 K/UL
BASOPHILS NFR BLD: 0.1 %
BUN SERPL-MCNC: 15 MG/DL
CALCIUM SERPL-MCNC: 8.6 MG/DL
CHLORIDE SERPL-SCNC: 101 MMOL/L
CO2 SERPL-SCNC: 30 MMOL/L
CREAT SERPL-MCNC: 0.8 MG/DL
DIFFERENTIAL METHOD: ABNORMAL
EOSINOPHIL # BLD AUTO: 0 K/UL
EOSINOPHIL NFR BLD: 0 %
ERYTHROCYTE [DISTWIDTH] IN BLOOD BY AUTOMATED COUNT: 16.7 %
EST. GFR  (AFRICAN AMERICAN): >60 ML/MIN/1.73 M^2
EST. GFR  (NON AFRICAN AMERICAN): >60 ML/MIN/1.73 M^2
GLUCOSE SERPL-MCNC: 88 MG/DL
HCT VFR BLD AUTO: 43.8 %
HGB BLD-MCNC: 14.6 G/DL
LYMPHOCYTES # BLD AUTO: 1.6 K/UL
LYMPHOCYTES NFR BLD: 22.5 %
MCH RBC QN AUTO: 27.2 PG
MCHC RBC AUTO-ENTMCNC: 33.3 %
MCV RBC AUTO: 82 FL
MONOCYTES # BLD AUTO: 1.2 K/UL
MONOCYTES NFR BLD: 16.3 %
NEUTROPHILS # BLD AUTO: 4.4 K/UL
NEUTROPHILS NFR BLD: 60.8 %
PLATELET # BLD AUTO: 212 K/UL
PMV BLD AUTO: 10.6 FL
POCT GLUCOSE: 106 MG/DL (ref 70–110)
POCT GLUCOSE: 157 MG/DL (ref 70–110)
POTASSIUM SERPL-SCNC: 3.5 MMOL/L
RBC # BLD AUTO: 5.37 M/UL
SODIUM SERPL-SCNC: 141 MMOL/L
WBC # BLD AUTO: 7.26 K/UL

## 2017-05-19 PROCEDURE — 99238 HOSP IP/OBS DSCHRG MGMT 30/<: CPT | Mod: ,,, | Performed by: HOSPITALIST

## 2017-05-19 PROCEDURE — 36415 COLL VENOUS BLD VENIPUNCTURE: CPT

## 2017-05-19 PROCEDURE — 25000003 PHARM REV CODE 250: Performed by: INTERNAL MEDICINE

## 2017-05-19 PROCEDURE — 80048 BASIC METABOLIC PNL TOTAL CA: CPT

## 2017-05-19 PROCEDURE — 97116 GAIT TRAINING THERAPY: CPT

## 2017-05-19 PROCEDURE — 94761 N-INVAS EAR/PLS OXIMETRY MLT: CPT

## 2017-05-19 PROCEDURE — 85025 COMPLETE CBC W/AUTO DIFF WBC: CPT

## 2017-05-19 PROCEDURE — 25000242 PHARM REV CODE 250 ALT 637 W/ HCPCS: Performed by: INTERNAL MEDICINE

## 2017-05-19 PROCEDURE — 94640 AIRWAY INHALATION TREATMENT: CPT

## 2017-05-19 PROCEDURE — 63600175 PHARM REV CODE 636 W HCPCS: Performed by: HOSPITALIST

## 2017-05-19 PROCEDURE — 27000221 HC OXYGEN, UP TO 24 HOURS

## 2017-05-19 PROCEDURE — 25000003 PHARM REV CODE 250: Performed by: STUDENT IN AN ORGANIZED HEALTH CARE EDUCATION/TRAINING PROGRAM

## 2017-05-19 RX ORDER — AMOXICILLIN 250 MG
1 CAPSULE ORAL 2 TIMES DAILY
COMMUNITY
Start: 2017-05-19 | End: 2018-08-12

## 2017-05-19 RX ORDER — BENZONATATE 200 MG/1
200 CAPSULE ORAL 3 TIMES DAILY PRN
Qty: 60 CAPSULE | Refills: 0 | Status: ON HOLD | OUTPATIENT
Start: 2017-05-19 | End: 2018-10-03

## 2017-05-19 RX ORDER — GUAIFENESIN 100 MG/5ML
200 SOLUTION ORAL EVERY 4 HOURS PRN
Refills: 0 | COMMUNITY
Start: 2017-05-19 | End: 2017-05-29

## 2017-05-19 RX ORDER — TIOTROPIUM BROMIDE 18 UG/1
1 CAPSULE ORAL; RESPIRATORY (INHALATION) DAILY
Qty: 30 CAPSULE | Refills: 11 | Status: ON HOLD | OUTPATIENT
Start: 2017-05-19 | End: 2018-10-04

## 2017-05-19 RX ORDER — PREDNISONE 20 MG/1
60 TABLET ORAL DAILY
Qty: 9 TABLET | Refills: 0 | Status: SHIPPED | OUTPATIENT
Start: 2017-05-19 | End: 2017-05-22

## 2017-05-19 RX ORDER — FLUTICASONE FUROATE AND VILANTEROL 200; 25 UG/1; UG/1
1 POWDER RESPIRATORY (INHALATION) DAILY
Qty: 14 EACH | Refills: 3 | Status: SHIPPED | OUTPATIENT
Start: 2017-05-19 | End: 2018-08-08

## 2017-05-19 RX ORDER — ALBUTEROL SULFATE 90 UG/1
2 AEROSOL, METERED RESPIRATORY (INHALATION) EVERY 6 HOURS PRN
Qty: 1 EACH | Refills: 11 | Status: SHIPPED | OUTPATIENT
Start: 2017-05-19 | End: 2018-08-05

## 2017-05-19 RX ADMIN — TIOTROPIUM BROMIDE 18 MCG: 18 CAPSULE ORAL; RESPIRATORY (INHALATION) at 08:05

## 2017-05-19 RX ADMIN — MOXIFLOXACIN HYDROCHLORIDE 400 MG: 400 TABLET, FILM COATED ORAL at 08:05

## 2017-05-19 RX ADMIN — BENZONATATE 200 MG: 100 CAPSULE ORAL at 08:05

## 2017-05-19 RX ADMIN — IPRATROPIUM BROMIDE AND ALBUTEROL SULFATE 3 ML: .5; 3 SOLUTION RESPIRATORY (INHALATION) at 12:05

## 2017-05-19 RX ADMIN — STANDARDIZED SENNA CONCENTRATE AND DOCUSATE SODIUM 1 TABLET: 8.6; 5 TABLET, FILM COATED ORAL at 08:05

## 2017-05-19 RX ADMIN — BENAZEPRIL HYDROCHLORIDE 40 MG: 10 TABLET, FILM COATED ORAL at 08:05

## 2017-05-19 RX ADMIN — Medication 3 ML: at 02:05

## 2017-05-19 RX ADMIN — IPRATROPIUM BROMIDE AND ALBUTEROL SULFATE 3 ML: .5; 3 SOLUTION RESPIRATORY (INHALATION) at 03:05

## 2017-05-19 RX ADMIN — LEVETIRACETAM 1000 MG: 500 TABLET, FILM COATED ORAL at 08:05

## 2017-05-19 RX ADMIN — PREDNISONE 60 MG: 20 TABLET ORAL at 08:05

## 2017-05-19 RX ADMIN — FLUTICASONE FUROATE AND VILANTEROL TRIFENATATE 1 PUFF: 200; 25 POWDER RESPIRATORY (INHALATION) at 08:05

## 2017-05-19 RX ADMIN — IPRATROPIUM BROMIDE AND ALBUTEROL SULFATE 3 ML: .5; 3 SOLUTION RESPIRATORY (INHALATION) at 08:05

## 2017-05-19 RX ADMIN — HEPARIN SODIUM 5000 UNITS: 5000 INJECTION, SOLUTION INTRAVENOUS; SUBCUTANEOUS at 02:05

## 2017-05-19 NOTE — PLAN OF CARE
[facility]    HOME HEALTH ORDERS  FACE TO FACE ENCOUNTER    Patient Name: Baltazar Mccray  YOB: 1960    PCP: Primary Doctor No   PCP Address: None  PCP Phone Number: None  PCP Fax: None    Encounter Date: 05/19/2017    Admit to Home Health    Diagnoses:  Active Hospital Problems    Diagnosis  POA    *COPD exacerbation [J44.1]  Yes      Resolved Hospital Problems    Diagnosis Date Resolved POA   No resolved problems to display.       Future Appointments  Date Time Provider Department Center   5/22/2017 4:00 PM PORTIA [facility] Sherif Valdovinos PCW     Follow-up Information     Follow up with Sherif Valdovinos [facility].    Specialty:  [facility]    Contact information:    [address]  [facility] [address] [phone]    Additional information:    [facility]            I have seen and examined this patient face to face today. My clinical findings that support the need for the home health skilled services and home bound status are the following:  Weakness/numbness causing balance and gait disturbance due to COPD Exacerbation making it taxing to leave home.    Allergies:Review of patient's allergies indicates:  No Known Allergies    Diet: regular diet    Activities: activity as tolerated    Nursing:   SN to complete comprehensive assessment including routine vital signs. Instruct on disease process and s/s of complications to report to MD. Review/verify medication list sent home with the patient at time of discharge  and instruct patient/caregiver as needed. Frequency may be adjusted depending on start of care date.    Notify MD if SBP > 160 or < 90; DBP > 90 or < 50; HR > 120 or < 50; Temp > 101;        MISCELLANEOUS CARE:  Home Oxygen:  Oxygen at 2 L/min nasal canula to be used:  Continuously.    WOUND CARE ORDERS  n/a      Medications: Review discharge medications with patient and family and  provide education.      Current Discharge Medication List      START taking these medications    Details   benzonatate (TESSALON) 200 MG capsule Take 1 capsule (200 mg total) by mouth 3 (three) times daily as needed for Cough.  Qty: 60 capsule, Refills: 0      fluticasone-vilanterol (BREO) 200-25 mcg/dose DsDv diskus inhaler Inhale 1 puff into the lungs once daily. Controller  Qty: 14 each, Refills: 3      guaifenesin 100 mg/5 ml (ROBITUSSIN) 100 mg/5 mL syrup Take 10 mLs (200 mg total) by mouth every 4 (four) hours as needed for Cough or Congestion.  Refills: 0      senna-docusate 8.6-50 mg (PERICOLACE) 8.6-50 mg per tablet Take 1 tablet by mouth 2 (two) times daily.      tiotropium (SPIRIVA) 18 mcg inhalation capsule Inhale 1 capsule (18 mcg total) into the lungs once daily. Controller  Qty: 30 capsule, Refills: 11         CONTINUE these medications which have CHANGED    Details   albuterol 90 mcg/actuation inhaler Inhale 2 puffs into the lungs every 6 (six) hours as needed for Wheezing.  Qty: 1 each, Refills: 11      predniSONE (DELTASONE) 20 MG tablet Take 3 tablets (60 mg total) by mouth once daily.  Qty: 9 tablet, Refills: 0         CONTINUE these medications which have NOT CHANGED    Details   benazepril (LOTENSIN) 40 MG tablet Take 40 mg by mouth once daily.        hydrochlorothiazide (HYDRODIURIL) 12.5 MG Tab Take 12.5 mg by mouth every 8 (eight) hours.        levetiracetam (KEPPRA) 1000 MG tablet Take 1,000 mg by mouth 2 (two) times daily.        omeprazole (PRILOSEC) 20 MG capsule Take 1 capsule (20 mg total) by mouth once daily.  Qty: 30 capsule, Refills: 1         STOP taking these medications       azithromycin (ZITHROMAX) 500 MG tablet Comments:   Reason for Stopping:               I certify that this patient is confined to his home and needs intermittent skilled nursing care.

## 2017-05-19 NOTE — DISCHARGE SUMMARY
[facility] Hospital Medicine  Discharge Summary      Patient Name: Baltazar Mccray  MRN: [id number]  Admission Date: 5/14/2017  Hospital Length of Stay: 5 days  Discharge Date and Time:  05/19/2017 4:10 PM  Attending Physician: Renny Alexis MD   Discharging Provider: Renny Alexis MD  Primary Care Provider: Primary Doctor [facility] Medicine Team: [facility]  Renny Alexis MD    HPI:   Patient began experiencing shortness of breath and coughing yesterday afternoon and has tried to use his inhaler at home with poor results.  He presented to the ED today and was given nebs and abx.  He says his symptoms have improved somewhat.  He says he thinks this is consistent with his previous asthma, copd flares.  He says he has continued to smoke daily.  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  He says he has not had any leg swelling or pain.      * No surgery found *      Indwelling Lines/Drains at time of discharge:   Lines/Drains/Airways          No matching active lines, drains, or airways        Hospital Course:   Admitted and started on IV steroids, LABA, and Long acting anticholinergic, Seen by Pulmonology. Still hypoxemic on room air. Oxygenation improved slightly after antibiotics and steroids and patient qualifies for home oxygen. Will discharge with home oxygen, LABA and anticholinergic, 3 more days of steroids and Priority care clinic appointment. Will need outpatient follow up for monitoring of Lung nodule.      Consults:   Consults         Status Ordering Provider     Inpatient consult to Pulmonology  Once     Provider:  (Not yet assigned)    Completed HUMZA BANEGAS     Inpatient consult to Respiratory Care  Once     Provider:  (Not yet assigned)    Acknowledged HUMZA BANEGAS     Inpatient consult to Social Work/Case Management  Once     Provider:  (Not yet assigned)    Acknowledged HUMZA BANEGAS          Significant Diagnostic Studies: Labs:   CMP   Recent  Labs  Lab 05/18/17  0410 05/19/17  0410    141   K 4.0 3.5   CL 99 101   CO2 33* 30*   * 88   BUN 16 15   CREATININE 0.9 0.8   CALCIUM 8.8 8.6*   ANIONGAP 8 10   ESTGFRAFRICA >60.0 >60.0   EGFRNONAA >60.0 >60.0   , CBC   Recent Labs  Lab 05/18/17  0410 05/19/17  0411   WBC 6.58 7.26   HGB 14.7 14.6   HCT 43.5 43.8    212    and INR   Lab Results   Component Value Date    INR 1.1 05/14/2017    INR 1.2 03/23/2014    INR 1.0 01/17/2009     Radiology: X-Ray:normal    CT scan:    1. No evidence of pulmonary thromboembolism.    2. 1.2 cm nodule in the lower lobe.  Followup chest CT in 1-2 months is recommended to document resolution.    3.  Nonspecific diffuse patchy airspace opacities and associated bronchiectasis.    Pending Diagnostic Studies:     None        Final Active Diagnoses:    Diagnosis Date Noted POA    PRINCIPAL PROBLEM:  COPD exacerbation [J44.1] 05/14/2017 Yes    Lung nodule [R91.1] 05/19/2017 No    Essential hypertension [I10] 05/19/2017 Yes      Problems Resolved During this Admission:    Diagnosis Date Noted Date Resolved POA      * COPD exacerbation  Continue Nebs prn, BREO And spriva prescribed as well   Prednisone for 3 more days  Moxifloxacin for 5 days while inpatient   CT negative for PE  Pulm Consulted: recommend LABA, anticholinergic and supplmental oxygen  Discharged with home oxygen at 2L continuous   Hypoxemia continues, patient is a candidate for home oxygen, prescribed      Lung nodule  1.2 cm lung nodule seen on CTA  Repeat imaging as outpatient to document resolution      Essential hypertension  Cont home meds       Discharged Condition: stable    Disposition: Home-Health Care [facility]    Follow Up:  Follow-up Information     Follow up with Sherif Valdovinos - [facility] On 5/24/2017.    Specialty:  Priority Care    Why:  1:00pm    Contact information:    [address]  [facility] [address] [phone]    Additional information:    [facility]        Patient Instructions:     OXYGEN FOR HOME USE   Order Specific Question Answer Comments   Liter Flow 2    Duration Continuous    Qualifying SpO2: 84%    Testing done at: Rest    Route nasal cannula    Portable mode: pulse dose acceptable    Device home concentrator with portable unit    Length of need (in months): 99 mos    Patient condition with qualifying saturation COPD    Height: 5' 6" (1.676 m)    Weight: 59.1 kg (130 lb 4.8 oz)    Does patient have medical equipment at home? none    Alternative treatment measures have been tried or considered and deemed clinically ineffective. Yes    Vendor: [facility] to deliver, orders given to Dariana   Expected Date of Delivery: 5/19/2017        Medications:  Reconciled Home Medications:   Current Discharge Medication List      START taking these medications    Details   benzonatate (TESSALON) 200 MG capsule Take 1 capsule (200 mg total) by mouth 3 (three) times daily as needed for Cough.  Qty: 60 capsule, Refills: 0      fluticasone-vilanterol (BREO) 200-25 mcg/dose DsDv diskus inhaler Inhale 1 puff into the lungs once daily. Controller  Qty: 14 each, Refills: 3      guaifenesin 100 mg/5 ml (ROBITUSSIN) 100 mg/5 mL syrup Take 10 mLs (200 mg total) by mouth every 4 (four) hours as needed for Cough or Congestion.  Refills: 0      senna-docusate 8.6-50 mg (PERICOLACE) 8.6-50 mg per tablet Take 1 tablet by mouth 2 (two) times daily.      tiotropium (SPIRIVA) 18 mcg inhalation capsule Inhale 1 capsule (18 mcg total) into the lungs once daily. Controller  Qty: 30 capsule, Refills: 11         CONTINUE these medications which have CHANGED    Details   albuterol 90 mcg/actuation inhaler Inhale 2 puffs into the lungs every 6 (six) hours as needed for Wheezing.  Qty: 1 each, Refills: 11      predniSONE (DELTASONE) 20 MG tablet Take 3 tablets (60 mg total) by mouth once daily.  Qty: 9 tablet, Refills: 0         CONTINUE these " medications which have NOT CHANGED    Details   benazepril (LOTENSIN) 40 MG tablet Take 40 mg by mouth once daily.        hydrochlorothiazide (HYDRODIURIL) 12.5 MG Tab Take 12.5 mg by mouth every 8 (eight) hours.        levetiracetam (KEPPRA) 1000 MG tablet Take 1,000 mg by mouth 2 (two) times daily.        omeprazole (PRILOSEC) 20 MG capsule Take 1 capsule (20 mg total) by mouth once daily.  Qty: 30 capsule, Refills: 1         STOP taking these medications       azithromycin (ZITHROMAX) 500 MG tablet Comments:   Reason for Stopping:             Time spent on the discharge of patient: 15 minutes    Renny Alexis MD  Department of Hospital Medicine  [facility]

## 2017-05-19 NOTE — PT/OT/SLP PROGRESS
"Physical Therapy  Treatment    Baltazar Mccray   MRN: [id number]   Admitting Diagnosis: COPD exacerbation    PT Received On: 17  PT Start Time: 1424     PT Stop Time: 1440    PT Total Time (min): 16 min       Billable Minutes:16    Gait [id number]    Treatment Type: Treatment  PT/PTA: PTA     PTA Visit Number: 1       General Precautions: Standard, fall  Orthopedic Precautions: N/A   Braces: N/A    Do you have any cultural, spiritual, Mormonism conflicts, given your current situation?: none    Subjective:  Communicated with nsg prior to session.cleared for therapy "feeling fine"    Pain Ratin/10              Pain Rating Post-Intervention: 0/10    Objective:   Patient found with: oxygen, pulse ox (continuous), telemetry    Functional Mobility:  Bed Mobility:   Supine to Sit: Modified Independent  Sit to Supine: Modified Independent    Transfers:  Sit <> Stand Assistance: Stand By Assistance, Supervision  Sit <> Stand Assistive Device: No Assistive Device    Gait:   Gait Distance: ~200 ft with no AD SBA x1 LOB pt able to self recover, 02 in tow vcs for taking his time for safety  Assistance 1: Stand by Assistance  Gait Assistive Device: No device  Gait Pattern: reciprocal    Balance:   Static Sit: EOB S/mod I  Dynamic Sit: EOB S performing seated therex  Static Stand:SBA/S  Dynamic stand SBA with gait     Therapeutic Activities and Exercises:  x20 reps AP,LAQ,hip flex seated EOB     AM-PAC 6 CLICK MOBILITY  How much help from another person does this patient currently need?   1 = Unable, Total/Dependent Assistance  2 = A lot, Maximum/Moderate Assistance  3 = A little, Minimum/Contact Guard/Supervision  4 = None, Modified Onset/Independent    Turning over in bed (including adjusting bedclothes, sheets and blankets)?: 4  Sitting down on and standing up from a chair with arms (e.g., wheelchair, bedside commode, etc.): 4  Moving from lying on back to sitting on the side of the bed?: 4  Moving to and from a bed " to a chair (including a wheelchair)?: 4  Need to walk in hospital room?: 3  Climbing 3-5 steps with a railing?: 3  Total Score: 22    AM-PAC Raw Score CMS G-Code Modifier Level of Impairment Assistance   6 % Total / Unable   7 - 9 CM 80 - 100% Maximal Assist   10 - 14 CL 60 - 80% Moderate Assist   15 - 19 CK 40 - 60% Moderate Assist   20 - 22 CJ 20 - 40% Minimal Assist   23 CI 1-20% SBA / CGA   24 CH 0% Independent/ Mod I     Patient left supine with all lines intact, call button in reach, nsg notified and belongings in reach.    Assessment:  Baltazar Mccray is a 56 y.o. male with a medical diagnosis of COPD exacerbation Pt tolerated well, no repts of SOB noted nor reported by pt,  pt would continue to benefit from skilled PT services to improve overall functional mobility, strength and endurance.      Rehab identified problem list/impairments: Rehab identified problem list/impairments: weakness, impaired endurance, impaired functional mobilty, gait instability, impaired cardiopulmonary response to activity, impaired self care skills    Rehab potential is good.    Activity tolerance: Good    Discharge recommendations: Discharge Facility/Level Of Care Needs: home with home health     Barriers to discharge: Barriers to Discharge: Decreased caregiver support    Equipment recommendations: Equipment Needed After Discharge: none     GOALS:   Physical Therapy Goals        Problem: Physical Therapy Goal    Goal Priority Disciplines Outcome Goal Variances Interventions   Physical Therapy Goal     PT/OT, PT Ongoing (interventions implemented as appropriate)     Description:  Goals to be met by: 17     Patient will increase functional independence with mobility by performin. Supine to sit with Modified [place] met  2. Sit to supine with Modified [place] met  3. Sit to stand transfer with Supervision  4. Gait  x 200 feet with Supervision using LRAD  5. Lower extremity exercise program x 30 reps per  handout, with independence                 PLAN:    Patient to be seen 3 x/week  to address the above listed problems via gait training, therapeutic activities, therapeutic exercises  Plan of Care expires: 06/14/17  Plan of Care reviewed with: patient         Radhadylan Riverssonu, PTA  05/19/2017

## 2017-05-19 NOTE — PROGRESS NOTES
Home Oxygen Evaluation    Date Performed: 5/19/2017    1) Patient's Home O2 Sat on room air, while at rest: 84%        If O2 sats on room air at rest are 88% or below, patient qualifies. No additional testing needed. Document N/A in steps 2 and 3. If 89% or above, complete steps 2.      2) Patient's O2 Sat on room air while exercising: n/a        If O2 sats on room air while exercising remain 89% or above patient does not qualify, no further testing needed Document N/A in step 3. If O2 sats on room air while exercising are 88% or below, continue to step 3.      3) Patient's O2 Sat while exercising on O2: n/a at n/a LPM         (Must show improvement from #2 for patients to qualify)    If O2 sats improve on oxygen, patient qualifies for portable oxygen. If not, the patient does not qualify.

## 2017-05-19 NOTE — PLAN OF CARE
CM met with patient at bedside. Patient ready to discharge home and wants to take the bus home, his normal mode of transportation. Patient states he will take an O2 concentrator at home but plans on returning to work tomorrow without O2. Patient states his breathing is the same as always, he's used to it. PCC rescheduled for Wed, 5/24/17 at 1:00pm.

## 2017-05-19 NOTE — NURSING
AVS d/c instructions given to patient, voices understanding. PIV d/gabbie per Staff RN, Leesa. Patient amb off unit per self.

## 2017-05-19 NOTE — PLAN OF CARE
Problem: Physical Therapy Goal  Goal: Physical Therapy Goal  Goals to be met by: 17     Patient will increase functional independence with mobility by performin. Supine to sit with Modified [place] met  2. Sit to supine with Modified [place] met  3. Sit to stand transfer with Supervision  4. Gait x 200 feet with Supervision using LRAD  5. Lower extremity exercise program x 30 reps per handout, with independence   Outcome: Ongoing (interventions implemented as appropriate)  Goals remain appropriate

## 2017-05-19 NOTE — ASSESSMENT & PLAN NOTE
Continue Nebs prn, BREO And spriva prescribed as well   Prednisone for 3 more days  Moxi for 5 days  IVF  CT negative for PE  Pulm Consulted: recommend LABA, anticholinergic and supplmental oxygenN  Hypoxemia continues, patient is a candidate for home oxygen, prescribed

## 2017-11-06 ENCOUNTER — HOSPITAL ENCOUNTER (EMERGENCY)
Facility: HOSPITAL | Age: 57
Discharge: HOME | End: 2017-11-06
Attending: EMERGENCY MEDICINE
Payer: MEDICAID

## 2017-11-06 VITALS
SYSTOLIC BLOOD PRESSURE: 153 MMHG | HEIGHT: 66 IN | BODY MASS INDEX: 20.51 KG/M2 | RESPIRATION RATE: 18 BRPM | HEART RATE: 77 BPM | OXYGEN SATURATION: 99 % | DIASTOLIC BLOOD PRESSURE: 72 MMHG | WEIGHT: 127.63 LBS | TEMPERATURE: 98 F

## 2017-11-06 DIAGNOSIS — J44.1 CHRONIC OBSTRUCTIVE PULMONARY DISEASE WITH ACUTE EXACERBATION: Primary | ICD-10-CM

## 2017-11-06 PROCEDURE — 99283 EMERGENCY DEPT VISIT LOW MDM: CPT

## 2017-11-06 PROCEDURE — 99284 EMERGENCY DEPT VISIT MOD MDM: CPT | Mod: ,,, | Performed by: EMERGENCY MEDICINE

## 2017-11-06 PROCEDURE — 63600175 PHARM REV CODE 636 W HCPCS: Performed by: EMERGENCY MEDICINE

## 2017-11-06 RX ORDER — PREDNISONE 20 MG/1
40 TABLET ORAL DAILY
Qty: 8 TABLET | Refills: 0 | Status: SHIPPED | OUTPATIENT
Start: 2017-11-06 | End: 2017-11-10

## 2017-11-06 RX ORDER — PREDNISONE 20 MG/1
40 TABLET ORAL
Status: COMPLETED | OUTPATIENT
Start: 2017-11-06 | End: 2017-11-06

## 2017-11-06 RX ADMIN — PREDNISONE 40 MG: 20 TABLET ORAL at 03:11

## 2017-11-06 NOTE — ED NOTES
Patient identifiers verified and correct for Baltazar Mccray.    LOC: The patient is awake, alert and aware of environment with an appropriate affect, the patient is oriented x 3 and speaking appropriately.  APPEARANCE: Patient resting comfortably and in no acute distress, patient is clean and well groomed, patient's clothing is properly fastened.  SKIN: The skin is warm and dry, color consistent with ethnicity, patient has normal skin turgor and moist mucus membranes, skin intact, no breakdown or bruising noted.  MUSCULOSKELETAL: Patient moving all extremities spontaneously, no obvious swelling or deformities noted.  RESPIRATORY: Airway is open and patent, respirations are spontaneous, patient has a normal effort and rate, no accessory muscle use noted,   CARDIAC: Patient has a normal rate and regular rhythm, no periphreal edema noted, capillary refill < 3 seconds.  ABDOMEN: Soft and non tender to palpation, no distention noted  NEUROLOGIC: PERRL, 3mm bilaterally, eyes open spontaneously, behavior appropriate to situation, follows commands, facial expression symmetrical, bilateral hand grasp equal and even, purposeful motor response noted, normal sensation in all extremities when touched with a finger.

## 2017-11-06 NOTE — ED NOTES
Patient states he ran out of his inhaler 2 days ago and has had SOB. Patient on home o2 at night.  Currently on 2L, denies SOB. States he just needs an inhaler refill.  Denies chest pain. Denies leg swelling.

## 2017-11-06 NOTE — ED PROVIDER NOTES
Encounter Date: 11/6/2017    SCRIBE #1 NOTE: I, Melia De La Vega, am scribing for, and in the presence of,  Dr. Reese . I have scribed the entire note.       History     Chief Complaint   Patient presents with    Shortness of Breath     i need my inhaler refilled, i have copd, on oxygen 2l at home, use at night, placed on oxygen in triage      Time patient was seen by the provider: 2:52 PM      The patient is a 57 y.o. male with hx of: HTN, COPD, and asthma that presents to the ED with a complaint of SOB. While getting ready for work he became extremely short of breath. Pt states he is out of his inhaler and needs a refill. Associated symptoms include cough. His last cigarette was yesterday. He has no PCP.       The history is provided by the patient and medical records.     Review of patient's allergies indicates:  No Known Allergies  Past Medical History:   Diagnosis Date    Asthma     Hypertension     Seizures      No past surgical history on file.  Family History   Problem Relation Age of Onset    Cancer Father     Hypertension Sister     Stroke Sister     Stroke Brother     Diabetes Neg Hx     Heart disease Neg Hx      Social History   Substance Use Topics    Smoking status: Current Every Day Smoker     Packs/day: 0.50    Smokeless tobacco: Not on file    Alcohol use No     Review of Systems   Respiratory: Positive for cough and shortness of breath.        Physical Exam     Initial Vitals [11/06/17 1211]   BP Pulse Resp Temp SpO2   (!) 153/72 77 (!) 28 98.3 °F (36.8 °C) (!) 86 %      MAP       99         Physical Exam    Vitals reviewed.  Constitutional:   57 y.o. male no acute distress noted.  Thin   Cardiovascular: Intact distal pulses.   Irregular rate and rhythm   Pulmonary/Chest: No respiratory distress.   Clear breath sounds bilaterally.    Musculoskeletal:   No peripheral edema noted.          ED Course   Procedures  Labs Reviewed - No data to display          Medical Decision Making:    History:   Old Medical Records: I decided to obtain old medical records.  57 y.o. male has refills Breo and Ventolin inhalers available at his pharmacy. I will initiate a 5 day course of prednisone 40 mg for mild acute exacerbation of COPD and have directed the patient to PCP for management of problems.             Scribe Attestation:   Scribe #1: I performed the above scribed service and the documentation accurately describes the services I performed. I attest to the accuracy of the note.            ED Course      Clinical Impression:   The encounter diagnosis was Chronic obstructive pulmonary disease with acute exacerbation.    Disposition:   Disposition: Discharged  Condition: Stable                        Jude Reese MD  11/07/17 2394

## 2017-11-18 ENCOUNTER — HOSPITAL ENCOUNTER (EMERGENCY)
Facility: HOSPITAL | Age: 57
Discharge: HOME | End: 2017-11-18
Attending: EMERGENCY MEDICINE
Payer: MEDICAID

## 2017-11-18 VITALS
WEIGHT: 135 LBS | OXYGEN SATURATION: 91 % | BODY MASS INDEX: 21.69 KG/M2 | HEIGHT: 66 IN | RESPIRATION RATE: 21 BRPM | SYSTOLIC BLOOD PRESSURE: 138 MMHG | DIASTOLIC BLOOD PRESSURE: 76 MMHG | TEMPERATURE: 99 F | HEART RATE: 92 BPM

## 2017-11-18 DIAGNOSIS — R06.02 SHORTNESS OF BREATH: ICD-10-CM

## 2017-11-18 DIAGNOSIS — J44.1 COPD EXACERBATION: Primary | ICD-10-CM

## 2017-11-18 PROCEDURE — 25000242 PHARM REV CODE 250 ALT 637 W/ HCPCS: Performed by: ORTHOPAEDIC SURGERY

## 2017-11-18 PROCEDURE — 99284 EMERGENCY DEPT VISIT MOD MDM: CPT | Mod: ,,, | Performed by: EMERGENCY MEDICINE

## 2017-11-18 PROCEDURE — 94640 AIRWAY INHALATION TREATMENT: CPT

## 2017-11-18 PROCEDURE — 99284 EMERGENCY DEPT VISIT MOD MDM: CPT | Mod: 25

## 2017-11-18 PROCEDURE — 63600175 PHARM REV CODE 636 W HCPCS: Performed by: ORTHOPAEDIC SURGERY

## 2017-11-18 RX ORDER — PREDNISONE 20 MG/1
60 TABLET ORAL
Status: COMPLETED | OUTPATIENT
Start: 2017-11-18 | End: 2017-11-18

## 2017-11-18 RX ORDER — IPRATROPIUM BROMIDE AND ALBUTEROL SULFATE 2.5; .5 MG/3ML; MG/3ML
3 SOLUTION RESPIRATORY (INHALATION)
Status: COMPLETED | OUTPATIENT
Start: 2017-11-18 | End: 2017-11-18

## 2017-11-18 RX ORDER — ALBUTEROL SULFATE 90 UG/1
1-2 AEROSOL, METERED RESPIRATORY (INHALATION) EVERY 6 HOURS PRN
Qty: 1 INHALER | Refills: 0 | Status: SHIPPED | OUTPATIENT
Start: 2017-11-18 | End: 2017-11-19 | Stop reason: ALTCHOICE

## 2017-11-18 RX ORDER — PREDNISONE 20 MG/1
60 TABLET ORAL DAILY
Qty: 12 TABLET | Refills: 0 | Status: ON HOLD | OUTPATIENT
Start: 2017-11-18 | End: 2017-11-22 | Stop reason: HOSPADM

## 2017-11-18 RX ADMIN — PREDNISONE 60 MG: 20 TABLET ORAL at 05:11

## 2017-11-18 RX ADMIN — IPRATROPIUM BROMIDE AND ALBUTEROL SULFATE 3 ML: .5; 3 SOLUTION RESPIRATORY (INHALATION) at 05:11

## 2017-11-18 NOTE — ED TRIAGE NOTES
Pt arrived to ED with CC of SOB - reports chronic SOB s/t COPD and athma but reports increased today, states feels similar to asthma attack, is out of inhaler medications. Pt also reports intermittent HA, dizziness and blurred vision - reports LEON relieved after taking tylenol at home. Pt with cough noted, reports chronic dry cough. Denies CP fever or chills.    Patient identifiers verified and correct for Baltazar Mccray.    LOC: The patient is awake, alert and oriented x 4. Pt is speaking appropriately, no slurred speech.  APPEARANCE: Patient resting comfortably and in no acute distress. Pt is clean and well groomed. No JVD visible. Pt reports pain level of 0.  SKIN: Skin is warm dry and intact, and color is consistent with ethnicity. No tenting observed and capillary refill <3 seconds. No clubbing noted to nail beds. No breakdown or brusing visible and mucus membranes moist and acyanotic.  MUSCULOSKELETAL: Full range of motion present in all extremities. Hand  equal and leg strength strong +5 bilaterally.  RESPIRATORY: Pt c/o SOB. Airway is open and patent. Respirations-mildly labored with increased RR of 24, equal bilaterally on inspiration and expiration. No accessory muscle use noted. Crackles noted to bilateral lung bases. SpO2 77% on room air on entry to room, placed on 3L O2 nasal cannula and SpO2 increased/ maintained at 96%. Pt placed on continuous pulse ox.  CARDIAC: Patient has regular heart rate and rhythm at this time with HR 96.  No peripheral edema noted, and patient has no c/o chest pain.  ABDOMEN: Soft and non-tender to palpation with no distention noted. Normoactive bowel sounds X4 quadrants. Pt has no complaints of abnormal bowel movements. Pt reports normal appetite.   NEUROLOGIC: Pt c/o intermittent HA, dizziness, and blurred vision. Eyes open spontaneously and facial expression symmetrical. Pt behavior appropriate to situation, and pt follows commands.  Pt reports sensation present in all  extremities when touched with a finger. PERRLA  : No complaints of frequency, burning, urgency or blood in the urine.

## 2017-11-18 NOTE — ED PROVIDER NOTES
Encounter Date: 11/18/2017    SCRIBE #1 NOTE: I, Laquita Orellana, am scribing for, and in the presence of,  Dr. Reese. I have scribed the following portions of the note - the Resident attestation. Other sections scribed: Chest X-ray.       History     Chief Complaint   Patient presents with    Shortness of Breath     Baltazar Mccray is a 57 y.o. Male hx of COPD presents with SOB and inability [facility]. He states that he uses it 3x a day when it is written for 2 so they will not fill it. He uses oxygen at home 2L at night. He has not been taking his inhaled steroids because he didn't think that he needed it.  He denies any increase in his work of breathing, fever, chills, or chest pain.          Review of patient's allergies indicates:  No Known Allergies  Past Medical History:   Diagnosis Date    Asthma     Hypertension     Seizures      History reviewed. No pertinent surgical history.  Family History   Problem Relation Age of Onset    Cancer Father     Hypertension Sister     Stroke Sister     Stroke Brother     Diabetes Neg Hx     Heart disease Neg Hx      Social History   Substance Use Topics    Smoking status: Current Every Day Smoker     Packs/day: 0.25    Smokeless tobacco: Never Used    Alcohol use No     Review of Systems   Constitutional: Negative for activity change, appetite change and fever.   HENT: Negative for postnasal drip and sinus pain.    Eyes: Negative for photophobia and visual disturbance.   Respiratory: Positive for shortness of breath. Negative for apnea, cough, choking, chest tightness, wheezing and stridor.    Cardiovascular: Negative for chest pain, palpitations and leg swelling.   Gastrointestinal: Negative for diarrhea and nausea.   Endocrine: Negative for polyphagia and polyuria.   Musculoskeletal: Negative for gait problem, joint swelling and myalgias.   Neurological: Negative for weakness, light-headedness, numbness and headaches.    Psychiatric/Behavioral: Negative for agitation, behavioral problems and confusion.       Physical Exam     Initial Vitals   BP Pulse Resp Temp SpO2   11/18/17 1645 11/18/17 1450 11/18/17 1450 11/18/17 1645 11/18/17 1450   (!) 141/72 96 (!) 24 98.5 °F (36.9 °C) 96 %      MAP       11/18/17 1645       95         Physical Exam    Nursing note and vitals reviewed.  Constitutional: He appears well-developed and well-nourished. He is not diaphoretic. No distress.   HENT:   Head: Normocephalic and atraumatic.   Eyes: Conjunctivae and EOM are normal. Pupils are equal, round, and reactive to light.   Neck: Normal range of motion. Neck supple.   Cardiovascular: Normal rate, regular rhythm and intact distal pulses.   Pulmonary/Chest: No stridor. No respiratory distress. He has wheezes (Mild BLL).   Abdominal: Soft. He exhibits no distension. There is no tenderness.   Musculoskeletal: Normal range of motion. He exhibits no edema or tenderness.   Neurological: He is alert and oriented to person, place, and time. He has normal strength.   Skin: Skin is warm and dry.   Psychiatric: He has a normal mood and affect. Thought content normal.         ED Course   Procedures  Labs Reviewed - No data to display     Imaging Results          X-Ray Chest PA And Lateral (Final result)  Result time 11/18/17 17:40:17    Final result by Chichi Faria MD (11/18/17 17:40:17)                 Impression:         Vague increased attenuation projected over the left lower lung zone, developing consolidation is a consideration versus atelectasis.  Correlation advised.    Otherwise, grossly stable chronic interstitial findings, could reflect interstitial edema.  Please note, nodule identified on previous CT is not readily apparent by plain radiography or has resolved..          Electronically signed by: CHICHI FARIA MD  Date:     11/18/17  Time:    17:40              Narrative:    Chest PA and lateral    Indication:Shortness of  breath    Comparison:CT 5/14/2017 and 5/14/2017    Findings:  The cardiomediastinal silhouette is not enlarged, noting calcification of the aortic arch.  There is no pleural effusion.  The trachea is midline.  The lungs are symmetrically expanded bilaterally with mildly coarse interstitial attenuation and bilateral basilar subsegmental atelectasis, left greater than right.  There is suspected left fibrotic change versus developing consolidation.   There is no pneumothorax.  The osseous structures are remarkable for degenerative changes.                              X-Rays:   Independently Interpreted Readings:   Chest X-Ray: 17:50 Mild atelectasis to left lower lung noted.   Other Readings:  CXR reviewed showing atelectasis on the LLL that appears chronic in nature.     Medical Decision Making:   History:   Old Medical Records: I decided to obtain old medical records.  Initial Assessment:   1725: pt seen and examined at bedside. Discussed with staff. Dx Acute COPD exacerbation. His sats have improved on 2L to 93%. Duonebs steroids and cxr ordered. In depth discussion with the patient about taking his inhaled steroids which he was not doing could prevent these exacerbations.   1823: Pt is feeling better after his duonebs and steroids. We will wean him off his oxygen.  1839: Pt sats at 94% on RA and 90% when ambulating. Long discussion with the patient that this is suboptimal and that he should be admitted for observation. He understands that he will use his home O2 when he gets home and take his inhaled steroids daily. He will go home on po steroids also. If he has worsening SOB he will come back immediately for evaluation.   Independently Interpreted Test(s):   I have ordered and independently interpreted X-rays - see prior notes.  Clinical Tests:   Lab Tests: Ordered and Reviewed  Radiological Study: Ordered and Reviewed  ED Management:  1725: pt seen and examined at bedside. Discussed with staff. Dx Acute COPD  exacerbation. His sats have improved on 2L to 93%. Duonebs steroids and cxr ordered. In depth discussion with the patient about taking his inhaled steroids which he was not doing could prevent these exacerbations.   1823: Pt is feeling better after his duonebs and steroids. We will wean him off his oxygen.  1839: Pt sats at 94% on RA and 90% when ambulating. Long discussion with the patient that this is suboptimal and that he should be admitted for observation. He understands that he will use his home O2 when he gets home and take his inhaled steroids daily. He will go home on po steroids also. If he has worsening SOB he will come back immediately for evaluation.        APC / Resident Notes:   1725: pt seen and examined at bedside. Discussed with staff. Dx Acute COPD exacerbation. His sats have improved on 2L to 93%. Duonebs steroids and cxr ordered. In depth discussion with the patient about taking his inhaled steroids which he was not doing could prevent these exacerbations.   1823: Pt is feeling better after his duonebs and steroids. We will wean him off his oxygen.  1839: Pt sats at 94% on RA and 90% when ambulating. Long discussion with the patient that this is suboptimal and that he should be admitted for observation. He understands that he will use his home O2 when he gets home and take his inhaled steroids daily. He will go home on po steroids also. If he has worsening SOB he will come back immediately for evaluation.        Scribe Attestation:   Scribe #1: I performed the above scribed service and the documentation accurately describes the services I performed. I attest to the accuracy of the note.    Attending Attestation:   Physician Attestation Statement for Resident:  As the supervising MD   Physician Attestation Statement: I have personally seen and examined this patient.   I agree with the above history. -: 57 y.o. male with history of poorly controlled COPD presents with worsening shortness of  breathe.   As the supervising MD I agree with the above PE.    As the supervising MD I agree with the above treatment, course, plan, and disposition.  I have reviewed and agree with the residents interpretation of the following: x-rays.  I have reviewed the following: old records at this facility.                    ED Course      Clinical Impression:   The primary encounter diagnosis was COPD exacerbation. A diagnosis of Shortness of breath was also pertinent to this visit.    Disposition:   Disposition: Discharged  Condition: Stable   Pt sats at 94% on RA and 90% when ambulating. Long discussion with the patient that this is suboptimal and that he should be admitted for observation. He understands that he will use his home O2 when he gets home and take his inhaled steroids daily. He will go home on po steroids also. If he has worsening SOB he will come back immediately for evaluation.                         Jude Reese MD  11/22/17 1071

## 2017-11-19 ENCOUNTER — HOSPITAL ENCOUNTER (EMERGENCY)
Facility: HOSPITAL | Age: 57
Discharge: HOME | End: 2017-11-19
Attending: EMERGENCY MEDICINE
Payer: MEDICAID

## 2017-11-19 VITALS
SYSTOLIC BLOOD PRESSURE: 142 MMHG | BODY MASS INDEX: 21.69 KG/M2 | HEART RATE: 95 BPM | TEMPERATURE: 99 F | RESPIRATION RATE: 18 BRPM | WEIGHT: 135 LBS | HEIGHT: 66 IN | DIASTOLIC BLOOD PRESSURE: 81 MMHG | OXYGEN SATURATION: 90 %

## 2017-11-19 DIAGNOSIS — J44.1 COPD EXACERBATION: ICD-10-CM

## 2017-11-19 DIAGNOSIS — R06.02 SHORTNESS OF BREATH: Primary | ICD-10-CM

## 2017-11-19 PROCEDURE — 93005 ELECTROCARDIOGRAM TRACING: CPT

## 2017-11-19 PROCEDURE — 99284 EMERGENCY DEPT VISIT MOD MDM: CPT | Mod: 25

## 2017-11-19 PROCEDURE — 93010 ELECTROCARDIOGRAM REPORT: CPT | Mod: ,,, | Performed by: INTERNAL MEDICINE

## 2017-11-19 PROCEDURE — 99284 EMERGENCY DEPT VISIT MOD MDM: CPT | Mod: ,,, | Performed by: PHYSICIAN ASSISTANT

## 2017-11-19 RX ORDER — DOXYCYCLINE 100 MG/1
100 CAPSULE ORAL EVERY 12 HOURS
Qty: 20 CAPSULE | Refills: 0 | Status: ON HOLD | OUTPATIENT
Start: 2017-11-19 | End: 2017-11-22

## 2017-11-19 RX ORDER — ALBUTEROL SULFATE 90 UG/1
1-2 AEROSOL, METERED RESPIRATORY (INHALATION) EVERY 6 HOURS PRN
Qty: 1 INHALER | Refills: 0 | Status: SHIPPED | OUTPATIENT
Start: 2017-11-19 | End: 2017-11-20

## 2017-11-19 NOTE — ED PROVIDER NOTES
Encounter Date: 11/19/2017    SCRIBE #1 NOTE: I, Jenn Hardy, am scribing for, and in the presence of,  Dr. Garica. I have scribed the following portions of the note - the EKG reading.       History     Chief Complaint   Patient presents with    Cough     The history is provided by the patient and medical records.     Review of patient's allergies indicates:  No Known Allergies  Past Medical History:   Diagnosis Date    Asthma     Hypertension     Seizures      History reviewed. No pertinent surgical history.  Family History   Problem Relation Age of Onset    Cancer Father     Hypertension Sister     Stroke Sister     Stroke Brother     Diabetes Neg Hx     Heart disease Neg Hx      Social History   Substance Use Topics    Smoking status: Current Every Day Smoker     Packs/day: 0.25    Smokeless tobacco: Never Used    Alcohol use No     Review of Systems    Physical Exam     Initial Vitals [11/19/17 1222]   BP Pulse Resp Temp SpO2   (!) 142/81 108 20 98.9 °F (37.2 °C) (!) 92 %      MAP       101.33         Physical Exam    ED Course   Procedures  Labs Reviewed - No data to display  EKG Readings: (Independently Interpreted)   EKG: NSR, no AGUSTIN's or STD's, non-specific twave pattern, no STEMI       Imaging Results    None            Medical Decision Making:   History:   Old Medical Records: I decided to obtain old medical records.  Independently Interpreted Test(s):   I have ordered and independently interpreted EKG Reading(s) - see prior notes  Clinical Tests:   Radiological Study: Ordered and Reviewed  Medical Tests: Ordered and Reviewed            Scribe Attestation:   Scribe #1: I performed the above scribed service and the documentation accurately describes the services I performed. I attest to the accuracy of the note.            ED Course      Clinical Impression:   There were no encounter diagnoses.

## 2017-11-19 NOTE — ED NOTES
States he went to [employer], couldn't get insurance paid for, would like to get it changed to [facility]. Update to [place].

## 2017-11-19 NOTE — ED NOTES
Patient resting in stretcher and is in NAD at this time. Pt is awake alert and oriented x4, respirations even and unlabored. Pt SpO2 maintained at 98-99% on 2L O2 nasal cannula, O2 decreased to 1L, will continue to monitor. Pt updated on POC. Bed low and locked with side rails up x2, call bell in pt reach.

## 2017-11-19 NOTE — ED TRIAGE NOTES
Patient states he was seen in ED yesterday, unable to get rx filled at [facility] for Albuterol and prednisone. States they closed at 6pm. States he needs a new rx and work note.

## 2017-11-19 NOTE — ED NOTES
Patient identifiers verified and correct for Mr Mccray  C/C: rx review  APPEARANCE: awake and alert in NAD.  SKIN: warm, dry and intact. No breakdown or bruising.  MUSCULOSKELETAL: Patient moving all extremities spontaneously, no obvious swelling or deformities noted. Ambulates independently.  RESPIRATORY: Denies shortness of breath.Respirations unlabored.   CARDIAC: Denies CP, 2+ distal pulses; no peripheral edema  ABDOMEN: S/ND/NT, Denies nausea  : voids spontaneously, denies difficulty  Neurologic: AAO x 4; follows commands equal strength in all extremities; denies numbness/tingling. Denies dizziness

## 2017-11-19 NOTE — ED PROVIDER NOTES
Encounter Date: 11/19/2017    SCRIBE #1 NOTE: I, Teena Orellana, am scribing for, and in the presence of, Jacqui Herbert PA-C.       History     Chief Complaint   Patient presents with    Cough     Time patient was seen by the provider: 12:46 PM      The patient is a 57 y.o. male with hx of: seizures, HTN and asthma that presents to the ED for prescription refill. Pt was here yesterday complaining of SOB and was treated with duo nebs and steroids for COPD exacerbation and states he feels better.  He reports [facility]olin prescription refilled but they would not refill it because it was not time to refill it. He reports that due to the nature of his job he uses his inhaler all the time. Pt endorses using his inhaled steroids this morning. He requires 2L oxygen nightly.  Pt denies SOB, fever, chest pain, or any other complaints.  He would like to return to work tomorrow with his inhaler.       The history is provided by the patient and medical records.     Review of patient's allergies indicates:  No Known Allergies  Past Medical History:   Diagnosis Date    Asthma     Hypertension     Seizures      History reviewed. No pertinent surgical history.  Family History   Problem Relation Age of Onset    Cancer Father     Hypertension Sister     Stroke Sister     Stroke Brother     Diabetes Neg Hx     Heart disease Neg Hx      Social History   Substance Use Topics    Smoking status: Current Every Day Smoker     Packs/day: 0.25    Smokeless tobacco: Never Used    Alcohol use No     Review of Systems   Constitutional: Negative for chills and fever.   HENT: Negative for congestion.    Eyes: Negative for pain.   Respiratory: Positive for shortness of breath.    Cardiovascular: Negative for chest pain.   Gastrointestinal: Negative for nausea and vomiting.   Genitourinary: Negative for difficulty urinating.   Musculoskeletal: Negative for back pain and myalgias.   Skin: Negative for  rash.   Neurological: Negative for headaches.       Physical Exam     Initial Vitals [11/19/17 1222]   BP Pulse Resp Temp SpO2   (!) 142/81 108 20 98.9 °F (37.2 °C) (!) 92 %      MAP       101.33         Physical Exam    Nursing note and vitals reviewed.  Constitutional: No distress.   HENT:   Mouth/Throat: Oropharynx is clear and moist. No oropharyngeal exudate.   Eyes: EOM are normal. Pupils are equal, round, and reactive to light.   Neck: Normal range of motion. Neck supple.   Cardiovascular: Normal rate, regular rhythm and normal heart sounds.   No murmur heard.  Pulmonary/Chest: Breath sounds normal. No respiratory distress. He has no wheezes. He has no rhonchi. He has no rales.   Abdominal: Soft. There is no tenderness. There is no rebound and no guarding.   Musculoskeletal: He exhibits no edema.   Neurological: He is alert and oriented to person, place, and time. He has normal strength. No cranial nerve deficit or sensory deficit.   Skin: No rash noted.         ED Course   Procedures  Labs Reviewed - No data to display     X-Ray Chest PA And Lateral   Final Result    Linear foci in the left costophrenic angle which may represent early pneumonia, atelectasis versus scar.  This exam is stable from yesterday's exam.         Electronically signed by: JUNITO LOU MD   Date:     11/19/17   Time:    13:31                   Medical Decision Making:   History:   Old Medical Records: I decided to obtain old medical records.  Clinical Tests:   Radiological Study: Reviewed and Ordered  Medical Tests: Reviewed and Ordered       APC / Resident Notes:   Patient presents to the ER with request for refill of his ventolin.  I reviewed his chart.  He has hx of copd and presented to ED yesterday with complaint of sob and difficulty refilling his inhaler at [facility]. The pt requires 2L of oxygen nightly at home. He was given duo neb's and steroids in ED yesterday for treatment of acute copd exacerbation. He had 94% O2 sat's  "on room air but decreased to 90% during ambulation. He was advised he should be admitted for observation but the pt declined and agreed to continue his home oxygen and inhaled steroids.      The pt was given prescription for prednisone and ventolin at time of discharge yesterday. Pt says he feels better today, but is requesting a refill of ventolin today and says he forgot his prescription at home and wants to go across the street to fill medications now. The pt has no evidence of respiratory distress and is at 99% O2 at rest. He does still desat to 90% after walking up and down the schwab. I again suggested admission for COPD exacerbation to pt but he declined. He understands the risk of respiratory distress or even death. His repeat CXR again shows  "Linear foci in the left costophrenic angle which may represent early pneumonia, atelectasis versus scar.  This exam is stable from yesterday's exam."  . Will send pt home with ventolin and antibiotics (Doxycycline). He was given ER return precautions and advised to follow up with PCP this week.  I discussed the care of this patient with my supervising MD.      I, Jacqui Herbert, personally performed the services described in this documentation. All medical record entries made by the scribe were at my direction and in my presence.  I have reviewed the chart and agree that the record reflects my personal performance and is accurate and complete. Jacqui Herbert, APC.  1:49 PM 11/19/2017             Scribe Attestation:   Scribe #1: I performed the above scribed service and the documentation accurately describes the services I performed. I attest to the accuracy of the note.    Attending Attestation:     Physician Attestation Statement for NP/PA:   I discussed this assessment and plan of this patient with the NP/PA, but I did not personally examine the patient. The face to face encounter was performed by the NP/PA.                  ED Course      Clinical Impression:   The " primary encounter diagnosis was Shortness of breath. A diagnosis of COPD exacerbation was also pertinent to this visit.    Disposition:   Disposition: Discharged  Condition: Stable                        DANIEL Carbajal  11/19/17 1351       Chava Garcia MD  11/20/17 3518

## 2017-11-20 ENCOUNTER — HOSPITAL ENCOUNTER (OUTPATIENT)
Facility: HOSPITAL | Age: 57
Discharge: HOME | End: 2017-11-22
Attending: EMERGENCY MEDICINE | Admitting: HOSPITALIST
Payer: MEDICAID

## 2017-11-20 DIAGNOSIS — J44.1 COPD EXACERBATION: Primary | ICD-10-CM

## 2017-11-20 DIAGNOSIS — R05.9 COUGH: ICD-10-CM

## 2017-11-20 DIAGNOSIS — R06.02 SHORTNESS OF BREATH: ICD-10-CM

## 2017-11-20 PROBLEM — R56.9 SEIZURES: Status: ACTIVE | Noted: 2017-11-20

## 2017-11-20 LAB
ALBUMIN SERPL BCP-MCNC: 2.8 G/DL
ALP SERPL-CCNC: 105 U/L
ALT SERPL W/O P-5'-P-CCNC: 49 U/L
ANION GAP SERPL CALC-SCNC: 9 MMOL/L
AST SERPL-CCNC: 94 U/L
BASOPHILS # BLD AUTO: 0.09 K/UL
BASOPHILS NFR BLD: 1.3 %
BILIRUB SERPL-MCNC: 0.2 MG/DL
BUN SERPL-MCNC: 6 MG/DL
CALCIUM SERPL-MCNC: 9 MG/DL
CHLORIDE SERPL-SCNC: 100 MMOL/L
CO2 SERPL-SCNC: 31 MMOL/L
CREAT SERPL-MCNC: 0.8 MG/DL
DIFFERENTIAL METHOD: ABNORMAL
EOSINOPHIL # BLD AUTO: 0.1 K/UL
EOSINOPHIL NFR BLD: 2.1 %
ERYTHROCYTE [DISTWIDTH] IN BLOOD BY AUTOMATED COUNT: 15.7 %
EST. GFR  (AFRICAN AMERICAN): >60 ML/MIN/1.73 M^2
EST. GFR  (NON AFRICAN AMERICAN): >60 ML/MIN/1.73 M^2
GLUCOSE SERPL-MCNC: 80 MG/DL
HCT VFR BLD AUTO: 44.3 %
HGB BLD-MCNC: 14.6 G/DL
IMM GRANULOCYTES # BLD AUTO: 0.02 K/UL
IMM GRANULOCYTES NFR BLD AUTO: 0.3 %
LYMPHOCYTES # BLD AUTO: 2.7 K/UL
LYMPHOCYTES NFR BLD: 40.1 %
MCH RBC QN AUTO: 28 PG
MCHC RBC AUTO-ENTMCNC: 33 G/DL
MCV RBC AUTO: 85 FL
MONOCYTES # BLD AUTO: 0.4 K/UL
MONOCYTES NFR BLD: 6.2 %
NEUTROPHILS # BLD AUTO: 3.4 K/UL
NEUTROPHILS NFR BLD: 50 %
NRBC BLD-RTO: 0 /100 WBC
PLATELET # BLD AUTO: 421 K/UL
PMV BLD AUTO: 10.2 FL
POCT GLUCOSE: 90 MG/DL (ref 70–110)
POTASSIUM SERPL-SCNC: 3.7 MMOL/L
PROCALCITONIN SERPL IA-MCNC: 0.16 NG/ML
PROT SERPL-MCNC: 7.8 G/DL
RBC # BLD AUTO: 5.21 M/UL
SODIUM SERPL-SCNC: 140 MMOL/L
WBC # BLD AUTO: 6.75 K/UL

## 2017-11-20 PROCEDURE — 85025 COMPLETE CBC W/AUTO DIFF WBC: CPT

## 2017-11-20 PROCEDURE — 25000003 PHARM REV CODE 250: Performed by: PHYSICIAN ASSISTANT

## 2017-11-20 PROCEDURE — 84145 PROCALCITONIN (PCT): CPT

## 2017-11-20 PROCEDURE — 80053 COMPREHEN METABOLIC PANEL: CPT

## 2017-11-20 PROCEDURE — 94640 AIRWAY INHALATION TREATMENT: CPT

## 2017-11-20 PROCEDURE — 25000242 PHARM REV CODE 250 ALT 637 W/ HCPCS

## 2017-11-20 PROCEDURE — 96360 HYDRATION IV INFUSION INIT: CPT

## 2017-11-20 PROCEDURE — G0378 HOSPITAL OBSERVATION PER HR: HCPCS

## 2017-11-20 PROCEDURE — 25000242 PHARM REV CODE 250 ALT 637 W/ HCPCS: Performed by: PHYSICIAN ASSISTANT

## 2017-11-20 PROCEDURE — 63600175 PHARM REV CODE 636 W HCPCS: Performed by: PHYSICIAN ASSISTANT

## 2017-11-20 PROCEDURE — 99285 EMERGENCY DEPT VISIT HI MDM: CPT | Mod: 25

## 2017-11-20 PROCEDURE — 99220 PR INITIAL OBSERVATION CARE,LEVL III: CPT | Mod: ,,, | Performed by: PHYSICIAN ASSISTANT

## 2017-11-20 PROCEDURE — 93005 ELECTROCARDIOGRAM TRACING: CPT

## 2017-11-20 PROCEDURE — 99285 EMERGENCY DEPT VISIT HI MDM: CPT | Mod: ,,, | Performed by: PHYSICIAN ASSISTANT

## 2017-11-20 PROCEDURE — 84484 ASSAY OF TROPONIN QUANT: CPT

## 2017-11-20 PROCEDURE — 82962 GLUCOSE BLOOD TEST: CPT

## 2017-11-20 RX ORDER — ACETAMINOPHEN 500 MG
1000 TABLET ORAL EVERY 8 HOURS PRN
Status: DISCONTINUED | OUTPATIENT
Start: 2017-11-20 | End: 2017-11-22 | Stop reason: HOSPADM

## 2017-11-20 RX ORDER — ENOXAPARIN SODIUM 100 MG/ML
40 INJECTION SUBCUTANEOUS EVERY 24 HOURS
Status: DISCONTINUED | OUTPATIENT
Start: 2017-11-20 | End: 2017-11-22 | Stop reason: HOSPADM

## 2017-11-20 RX ORDER — AMOXICILLIN 250 MG
1 CAPSULE ORAL 2 TIMES DAILY
Status: DISCONTINUED | OUTPATIENT
Start: 2017-11-20 | End: 2017-11-22 | Stop reason: HOSPADM

## 2017-11-20 RX ORDER — IPRATROPIUM BROMIDE AND ALBUTEROL SULFATE 2.5; .5 MG/3ML; MG/3ML
SOLUTION RESPIRATORY (INHALATION)
Status: COMPLETED
Start: 2017-11-20 | End: 2017-11-20

## 2017-11-20 RX ORDER — ONDANSETRON 2 MG/ML
4 INJECTION INTRAMUSCULAR; INTRAVENOUS EVERY 8 HOURS PRN
Status: DISCONTINUED | OUTPATIENT
Start: 2017-11-20 | End: 2017-11-22 | Stop reason: HOSPADM

## 2017-11-20 RX ORDER — IPRATROPIUM BROMIDE AND ALBUTEROL SULFATE 2.5; .5 MG/3ML; MG/3ML
3 SOLUTION RESPIRATORY (INHALATION)
Status: DISCONTINUED | OUTPATIENT
Start: 2017-11-20 | End: 2017-11-20

## 2017-11-20 RX ORDER — FLUTICASONE FUROATE AND VILANTEROL 200; 25 UG/1; UG/1
1 POWDER RESPIRATORY (INHALATION) DAILY
Status: DISCONTINUED | OUTPATIENT
Start: 2017-11-21 | End: 2017-11-22 | Stop reason: HOSPADM

## 2017-11-20 RX ORDER — BENAZEPRIL HYDROCHLORIDE 20 MG/1
40 TABLET ORAL DAILY
Status: DISCONTINUED | OUTPATIENT
Start: 2017-11-21 | End: 2017-11-22 | Stop reason: HOSPADM

## 2017-11-20 RX ORDER — DOXYCYCLINE 50 MG/1
100 CAPSULE ORAL EVERY 12 HOURS
Status: DISCONTINUED | OUTPATIENT
Start: 2017-11-20 | End: 2017-11-22 | Stop reason: HOSPADM

## 2017-11-20 RX ORDER — GLUCAGON 1 MG
1 KIT INJECTION
Status: DISCONTINUED | OUTPATIENT
Start: 2017-11-20 | End: 2017-11-22 | Stop reason: HOSPADM

## 2017-11-20 RX ORDER — LEVETIRACETAM 500 MG/1
1000 TABLET ORAL 2 TIMES DAILY
Status: DISCONTINUED | OUTPATIENT
Start: 2017-11-20 | End: 2017-11-22 | Stop reason: HOSPADM

## 2017-11-20 RX ORDER — ONDANSETRON 8 MG/1
8 TABLET, ORALLY DISINTEGRATING ORAL EVERY 8 HOURS PRN
Status: DISCONTINUED | OUTPATIENT
Start: 2017-11-20 | End: 2017-11-22 | Stop reason: HOSPADM

## 2017-11-20 RX ORDER — IBUPROFEN 200 MG
16 TABLET ORAL
Status: DISCONTINUED | OUTPATIENT
Start: 2017-11-20 | End: 2017-11-22 | Stop reason: HOSPADM

## 2017-11-20 RX ORDER — HYDRALAZINE HYDROCHLORIDE 25 MG/1
25 TABLET, FILM COATED ORAL EVERY 8 HOURS PRN
Status: DISCONTINUED | OUTPATIENT
Start: 2017-11-20 | End: 2017-11-22 | Stop reason: HOSPADM

## 2017-11-20 RX ORDER — TIOTROPIUM BROMIDE 18 UG/1
1 CAPSULE ORAL; RESPIRATORY (INHALATION) DAILY
Status: DISCONTINUED | OUTPATIENT
Start: 2017-11-21 | End: 2017-11-22 | Stop reason: HOSPADM

## 2017-11-20 RX ORDER — PANTOPRAZOLE SODIUM 40 MG/1
40 TABLET, DELAYED RELEASE ORAL DAILY
Status: DISCONTINUED | OUTPATIENT
Start: 2017-11-21 | End: 2017-11-22 | Stop reason: HOSPADM

## 2017-11-20 RX ORDER — IBUPROFEN 200 MG
24 TABLET ORAL
Status: DISCONTINUED | OUTPATIENT
Start: 2017-11-20 | End: 2017-11-22 | Stop reason: HOSPADM

## 2017-11-20 RX ORDER — PREDNISONE 20 MG/1
40 TABLET ORAL 2 TIMES DAILY
Status: DISCONTINUED | OUTPATIENT
Start: 2017-11-21 | End: 2017-11-22 | Stop reason: HOSPADM

## 2017-11-20 RX ORDER — PREDNISONE 20 MG/1
40 TABLET ORAL
Status: COMPLETED | OUTPATIENT
Start: 2017-11-20 | End: 2017-11-20

## 2017-11-20 RX ORDER — HYDROCHLOROTHIAZIDE 12.5 MG/1
12.5 TABLET ORAL EVERY 8 HOURS
Status: DISCONTINUED | OUTPATIENT
Start: 2017-11-20 | End: 2017-11-20

## 2017-11-20 RX ORDER — IPRATROPIUM BROMIDE AND ALBUTEROL SULFATE 2.5; .5 MG/3ML; MG/3ML
3 SOLUTION RESPIRATORY (INHALATION)
Status: COMPLETED | OUTPATIENT
Start: 2017-11-20 | End: 2017-11-20

## 2017-11-20 RX ORDER — IPRATROPIUM BROMIDE AND ALBUTEROL SULFATE 2.5; .5 MG/3ML; MG/3ML
3 SOLUTION RESPIRATORY (INHALATION) EVERY 4 HOURS
Status: DISCONTINUED | OUTPATIENT
Start: 2017-11-21 | End: 2017-11-22 | Stop reason: HOSPADM

## 2017-11-20 RX ORDER — SODIUM CHLORIDE 0.9 % (FLUSH) 0.9 %
5 SYRINGE (ML) INJECTION
Status: DISCONTINUED | OUTPATIENT
Start: 2017-11-20 | End: 2017-11-22 | Stop reason: HOSPADM

## 2017-11-20 RX ORDER — IBUPROFEN 200 MG
1 TABLET ORAL DAILY
Status: DISCONTINUED | OUTPATIENT
Start: 2017-11-21 | End: 2017-11-22 | Stop reason: HOSPADM

## 2017-11-20 RX ADMIN — IPRATROPIUM BROMIDE AND ALBUTEROL SULFATE 3 ML: .5; 3 SOLUTION RESPIRATORY (INHALATION) at 06:11

## 2017-11-20 RX ADMIN — IPRATROPIUM BROMIDE AND ALBUTEROL SULFATE 3 ML: .5; 3 SOLUTION RESPIRATORY (INHALATION) at 11:11

## 2017-11-20 RX ADMIN — STANDARDIZED SENNA CONCENTRATE AND DOCUSATE SODIUM 1 TABLET: 8.6; 5 TABLET, FILM COATED ORAL at 10:11

## 2017-11-20 RX ADMIN — LEVETIRACETAM 1000 MG: 500 TABLET ORAL at 10:11

## 2017-11-20 RX ADMIN — IPRATROPIUM BROMIDE AND ALBUTEROL SULFATE 3 ML: .5; 3 SOLUTION RESPIRATORY (INHALATION) at 07:11

## 2017-11-20 RX ADMIN — ENOXAPARIN SODIUM 40 MG: 100 INJECTION SUBCUTANEOUS at 10:11

## 2017-11-20 RX ADMIN — SODIUM CHLORIDE 1000 ML: 0.9 INJECTION, SOLUTION INTRAVENOUS at 07:11

## 2017-11-20 RX ADMIN — PREDNISONE 40 MG: 20 TABLET ORAL at 08:11

## 2017-11-20 RX ADMIN — DOXYCYCLINE 100 MG: 50 CAPSULE ORAL at 10:11

## 2017-11-20 RX ADMIN — HYDRALAZINE HYDROCHLORIDE 25 MG: 25 TABLET, FILM COATED ORAL at 10:11

## 2017-11-21 LAB
ALBUMIN SERPL BCP-MCNC: 2.7 G/DL
ALP SERPL-CCNC: 96 U/L
ALT SERPL W/O P-5'-P-CCNC: 36 U/L
ANION GAP SERPL CALC-SCNC: 11 MMOL/L
AST SERPL-CCNC: 41 U/L
BASOPHILS # BLD AUTO: 0.02 K/UL
BASOPHILS NFR BLD: 0.5 %
BILIRUB SERPL-MCNC: 0.3 MG/DL
BUN SERPL-MCNC: 6 MG/DL
CALCIUM SERPL-MCNC: 8.8 MG/DL
CHLORIDE SERPL-SCNC: 101 MMOL/L
CO2 SERPL-SCNC: 28 MMOL/L
CREAT SERPL-MCNC: 0.8 MG/DL
DIFFERENTIAL METHOD: ABNORMAL
EOSINOPHIL # BLD AUTO: 0 K/UL
EOSINOPHIL NFR BLD: 0 %
ERYTHROCYTE [DISTWIDTH] IN BLOOD BY AUTOMATED COUNT: 15.5 %
EST. GFR  (AFRICAN AMERICAN): >60 ML/MIN/1.73 M^2
EST. GFR  (NON AFRICAN AMERICAN): >60 ML/MIN/1.73 M^2
GLUCOSE SERPL-MCNC: 103 MG/DL
HCT VFR BLD AUTO: 41.5 %
HGB BLD-MCNC: 13.9 G/DL
IMM GRANULOCYTES # BLD AUTO: 0.01 K/UL
IMM GRANULOCYTES NFR BLD AUTO: 0.2 %
LYMPHOCYTES # BLD AUTO: 0.9 K/UL
LYMPHOCYTES NFR BLD: 21.9 %
MCH RBC QN AUTO: 27.8 PG
MCHC RBC AUTO-ENTMCNC: 33.5 G/DL
MCV RBC AUTO: 83 FL
MONOCYTES # BLD AUTO: 0.2 K/UL
MONOCYTES NFR BLD: 5.4 %
NEUTROPHILS # BLD AUTO: 2.9 K/UL
NEUTROPHILS NFR BLD: 72 %
NRBC BLD-RTO: 0 /100 WBC
PLATELET # BLD AUTO: 366 K/UL
PMV BLD AUTO: 9.8 FL
POCT GLUCOSE: 161 MG/DL (ref 70–110)
POCT GLUCOSE: 239 MG/DL (ref 70–110)
POCT GLUCOSE: 48 MG/DL (ref 70–110)
POTASSIUM SERPL-SCNC: 4.1 MMOL/L
PROT SERPL-MCNC: 7.4 G/DL
RBC # BLD AUTO: 5 M/UL
SODIUM SERPL-SCNC: 140 MMOL/L
TROPONIN I SERPL DL<=0.01 NG/ML-MCNC: 0.02 NG/ML
TROPONIN I SERPL DL<=0.01 NG/ML-MCNC: 0.08 NG/ML
WBC # BLD AUTO: 4.06 K/UL

## 2017-11-21 PROCEDURE — 25000242 PHARM REV CODE 250 ALT 637 W/ HCPCS: Performed by: PHYSICIAN ASSISTANT

## 2017-11-21 PROCEDURE — 97161 PT EVAL LOW COMPLEX 20 MIN: CPT

## 2017-11-21 PROCEDURE — 27000221 HC OXYGEN, UP TO 24 HOURS

## 2017-11-21 PROCEDURE — 80053 COMPREHEN METABOLIC PANEL: CPT

## 2017-11-21 PROCEDURE — 85025 COMPLETE CBC W/AUTO DIFF WBC: CPT

## 2017-11-21 PROCEDURE — 25000003 PHARM REV CODE 250: Performed by: PHYSICIAN ASSISTANT

## 2017-11-21 PROCEDURE — 94640 AIRWAY INHALATION TREATMENT: CPT

## 2017-11-21 PROCEDURE — G0378 HOSPITAL OBSERVATION PER HR: HCPCS

## 2017-11-21 PROCEDURE — S4991 NICOTINE PATCH NONLEGEND: HCPCS | Performed by: PHYSICIAN ASSISTANT

## 2017-11-21 PROCEDURE — 84484 ASSAY OF TROPONIN QUANT: CPT

## 2017-11-21 PROCEDURE — 36415 COLL VENOUS BLD VENIPUNCTURE: CPT

## 2017-11-21 PROCEDURE — 94761 N-INVAS EAR/PLS OXIMETRY MLT: CPT

## 2017-11-21 PROCEDURE — 63600175 PHARM REV CODE 636 W HCPCS: Performed by: PHYSICIAN ASSISTANT

## 2017-11-21 PROCEDURE — 99226 PR SUBSEQUENT OBSERVATION CARE,LEVEL III: CPT | Mod: ,,, | Performed by: PHYSICIAN ASSISTANT

## 2017-11-21 RX ORDER — CALCIUM CARBONATE 200(500)MG
500 TABLET,CHEWABLE ORAL 2 TIMES DAILY PRN
Status: DISCONTINUED | OUTPATIENT
Start: 2017-11-21 | End: 2017-11-22 | Stop reason: HOSPADM

## 2017-11-21 RX ADMIN — ENOXAPARIN SODIUM 40 MG: 100 INJECTION SUBCUTANEOUS at 05:11

## 2017-11-21 RX ADMIN — STANDARDIZED SENNA CONCENTRATE AND DOCUSATE SODIUM 1 TABLET: 8.6; 5 TABLET, FILM COATED ORAL at 08:11

## 2017-11-21 RX ADMIN — LEVETIRACETAM 1000 MG: 500 TABLET ORAL at 08:11

## 2017-11-21 RX ADMIN — IPRATROPIUM BROMIDE AND ALBUTEROL SULFATE 3 ML: .5; 3 SOLUTION RESPIRATORY (INHALATION) at 04:11

## 2017-11-21 RX ADMIN — TIOTROPIUM BROMIDE 18 MCG: 18 CAPSULE ORAL; RESPIRATORY (INHALATION) at 01:11

## 2017-11-21 RX ADMIN — IPRATROPIUM BROMIDE AND ALBUTEROL SULFATE 3 ML: .5; 3 SOLUTION RESPIRATORY (INHALATION) at 08:11

## 2017-11-21 RX ADMIN — STANDARDIZED SENNA CONCENTRATE AND DOCUSATE SODIUM 1 TABLET: 8.6; 5 TABLET, FILM COATED ORAL at 09:11

## 2017-11-21 RX ADMIN — PREDNISONE 40 MG: 20 TABLET ORAL at 08:11

## 2017-11-21 RX ADMIN — IPRATROPIUM BROMIDE AND ALBUTEROL SULFATE 3 ML: .5; 3 SOLUTION RESPIRATORY (INHALATION) at 03:11

## 2017-11-21 RX ADMIN — PREDNISONE 40 MG: 20 TABLET ORAL at 09:11

## 2017-11-21 RX ADMIN — ACETAMINOPHEN 1000 MG: 500 TABLET ORAL at 05:11

## 2017-11-21 RX ADMIN — PANTOPRAZOLE SODIUM 40 MG: 40 TABLET, DELAYED RELEASE ORAL at 09:11

## 2017-11-21 RX ADMIN — FLUTICASONE FUROATE AND VILANTEROL TRIFENATATE 1 PUFF: 200; 25 POWDER RESPIRATORY (INHALATION) at 01:11

## 2017-11-21 RX ADMIN — IPRATROPIUM BROMIDE AND ALBUTEROL SULFATE 3 ML: .5; 3 SOLUTION RESPIRATORY (INHALATION) at 12:11

## 2017-11-21 RX ADMIN — BENAZEPRIL HYDROCHLORIDE 40 MG: 20 TABLET, FILM COATED ORAL at 09:11

## 2017-11-21 RX ADMIN — CALCIUM CARBONATE (ANTACID) CHEW TAB 500 MG 500 MG: 500 CHEW TAB at 07:11

## 2017-11-21 RX ADMIN — LEVETIRACETAM 1000 MG: 500 TABLET ORAL at 09:11

## 2017-11-21 RX ADMIN — DOXYCYCLINE 100 MG: 50 CAPSULE ORAL at 09:11

## 2017-11-21 RX ADMIN — NICOTINE 1 PATCH: 14 PATCH, EXTENDED RELEASE TRANSDERMAL at 09:11

## 2017-11-21 RX ADMIN — DOXYCYCLINE 100 MG: 50 CAPSULE ORAL at 08:11

## 2017-11-21 NOTE — ASSESSMENT & PLAN NOTE
- uncontrolled on admit, does not take prescribed benazepril  - restart home dose of Benazepril 40mg PO daily  - hydralazine PRN  - 11/21 uncontrolled but improving will continue benazepril

## 2017-11-21 NOTE — PLAN OF CARE
Problem: Physical Therapy Goal  Goal: Physical Therapy Goal  Outcome: Outcome(s) achieved Date Met: 11/21/17  PT eval complete, no goals set, no skilled need for PT at this time

## 2017-11-21 NOTE — ASSESSMENT & PLAN NOTE
- COPD pathway initiated   - patient likely not compliant with medications. History is confusing, but he is determined to learn and stop smoking. No PCP. No increased O2 requirements (uses 2L QHS). Saturating well on RA, in no distress. I am hopeful that the persistent education provided by the COPD pathway will help him understand his disease and how to treat it.   - continue doxycycline 100mg PO q12h and prednisone 60mg PO daily (patient did not pick this prescription up from pharmacy?), scheduled nebs  - CXR: Stable  - long discussion with patient about COPD and prevention vs rescue and role of medications and smoking cessation  - Well's score: 0  - 11/21 cough and SOB improving with current regimen

## 2017-11-21 NOTE — HOSPITAL COURSE
Pt admitted to observation for COPD exacerbation.  COPD Pathway initiated on admission.  Doxycycline 100 mg po q 12 hrs (started prior to admission) and prednisone 40 mg daily initiated.  PT/OT evaluated the patient and no needs (home).  During hospital course, pt received 3 days of prednisone and will continue prednisone at discharge for additional 2 days.  Pt also received doxycycline 4 doses/2 days during hospitalization and took 1 day worth prior to admission.  Pt to take additional 2 days of doxycycline at discharge to finish 5 day course.  Pt to continue keppra 1000 mg po bid for seizures (stable during hospital course).  Pt to continue benazepril 40 mg daily for HTN (pt noncompliant prior to admission).

## 2017-11-21 NOTE — ASSESSMENT & PLAN NOTE
- COPD pathway initiated   - patient likely not compliant with medications. History is confusing, but he is determined to learn and stop smoking. No PCP. No increased O2 requirements (uses 2L QHS). Saturating well on RA, in no distress. I am hopeful that the persistent education provided by the COPD pathway will help him understand his disease and how to treat it.   - continue doxycycline 100mg PO q12h and prednisone 60mg PO daily (patient did not pick this prescription up from pharmacy?), scheduled nebs  - CXR: Stable  - long discussion with patient about COPD and prevention vs rescue and role of medications and smoking cessation  - Well's score: 0

## 2017-11-21 NOTE — PLAN OF CARE
Problem: Chronic Obstructive Pulmonary Disease (Adult)  Intervention: Optimize Functional Ability/Increase Activity Tolerance   11/21/17 0336   Activity   Activity Type ambulated in room;sitting, edge of bed;up ad karen

## 2017-11-21 NOTE — PLAN OF CARE
11/21/17 0945   Discharge Assessment   Assessment Type Discharge Planning Assessment   Confirmed/corrected address and phone number on facesheet? Yes   Assessment information obtained from? Patient   Expected Length of Stay (days) 2   Communicated expected length of stay with patient/caregiver yes   Prior to hospitilization cognitive status: Alert/Oriented   Prior to hospitalization functional status: Independent   Current cognitive status: Alert/Oriented   Current Functional Status: Independent   Lives With alone   Able to Return to Prior Arrangements yes   Is patient able to care for self after discharge? Yes   Patient's perception of discharge disposition home or selfcare   Readmission Within The Last 30 Days no previous admission in last 30 days   Patient currently being followed by outpatient case management? No   Patient currently receives any other outside agency services? No   Equipment Currently Used at Home oxygen   Do you have any problems affording any of your prescribed medications? No   Is the patient taking medications as prescribed? yes   Does the patient have transportation home? Yes   Transportation Available public transportation   Does the patient receive services at the [facility]? No   Discharge Plan A Home   Discharge Plan B Home Health   Patient/Family In Agreement With Plan yes     Patient awake & alert in bed when CM rounded. No family at the bedside. Patient was admitted with COPD exacerbation. Patient lives alone, uses 2L O2 via NC as needed, & is independent of all ADLs. Plan to discharge patient home alone or home with home health when medically stable. Patient stated that he will take public transportation at time of discharge. Patient stated that he does not have a PCP & requested assistance getting established. Hospital follow up appointment scheduled for the patient with Dr. Rick Hsieh at [facility] on 11/30/17 at 0900. Will continue to  follow.

## 2017-11-21 NOTE — PT/OT/SLP EVAL
Physical Therapy  Evaluation/Discharge    Baltazar Mccray   MRN: [id number]   Admitting Diagnosis: COPD exacerbation    PT Received On: 11/21/17  PT Start Time: 0938     PT Stop Time: 1001    PT Total Time (min): 23 min       Billable Minutes:  Evaluation 23    Diagnosis: COPD exacerbation      Past Medical History:   Diagnosis Date    Asthma     Hypertension     Seizures       History reviewed. No pertinent surgical history.    Referring physician: Alec Glover PA-C  Date referred to PT: 11/20/17    General Precautions: Standard, fall  Orthopedic Precautions: N/A   Braces: N/A       Do you have any cultural, spiritual, Lutheran conflicts, given your current situation?: none    Patient History:  Lives With: alone  Living Arrangements: apartment  Home Accessibility:  (no concerns)  Home Layout: Able to live on 1st floor  Transportation Available: public transportation  Living Environment Comment: pt lives alone in 1st floor apartment with no AGUSTIN, PLOF I without AD including working full time at [employer], pt has tub shower with bench, uses O2 at night, and landlord is able to provide occasional assist if needed  Equipment Currently Used at Home: bath bench  DME owned (not currently used): none    Previous Level of Function:  Ambulation Skills: independent  Transfer Skills: independent  ADL Skills: independent  Work/Leisure Activity: independent    Subjective:  Communicated with pt and nurse prior to session.  Pt agreeable to PT eval   Chief Complaint: coughing  Patient goals: to return home at OF    Pain/Comfort  Pain Rating 1: 0/10  Pain Rating Post-Intervention 1: 0/10      Objective:   Patient found with: oxygen, peripheral IV   Pt found supine in bed with O2 via nasal cannula on 2L  O2 sats: RA 90-91%     RA post amb 100 feet 83-85% with return to 90% after few min of deep breathing     2L post amb 100 feet 91%     Cognitive Exam:  Oriented to: Person, Place, Time and Situation    Follows Commands/attention:  Follows multistep  commands  Communication: clear/fluent  Safety awareness/insight to disability: intact    Physical Exam:  Postural examination/scapula alignment: No postural abnormalities identified    Skin integrity: Visible skin intact  Edema: None noted     Sensation:   Intact    Upper Extremity Range of Motion:  Right Upper Extremity: WFL  Left Upper Extremity: WFL    Upper Extremity Strength:  Right Upper Extremity: WFL  Left Upper Extremity: WFL    Lower Extremity Range of Motion:  Right Lower Extremity: WFL  Left Lower Extremity: WFL    Lower Extremity Strength:  Right Lower Extremity: WFL  Left Lower Extremity: WFL     Fine motor coordination:  Intact    Gross motor coordination: WFL    Functional Mobility:  Bed Mobility:  Supine to Sit: Independent  Sit to Supine: Independent    Transfers:  Sit <> Stand Assistance: Independent  Sit <> Stand Assistive Device: No Assistive Device    Gait:   Gait Distance: 100 feet x3 trials  Assistance 1: Independent  Gait Assistive Device: No device      Balance:   Static Sit: NORMAL: No deviations seen in posture held statically  Dynamic Sit: NORMAL: No deviations seen in posture held dynamically  Static Stand: NORMAL: No deviations seen in posture held statically  Dynamic stand: NORMAL: No deviations seen in posture held dynamically    Therapeutic Activities and Exercises:  PT provided pt education on:   -role of PT and POC/DC   -importance of OOB activity   -encourage amb in hallway with assistance from staff and O2 as needed     AM-PAC 6 CLICK MOBILITY  How much help from another person does this patient currently need?   1 = Unable, Total/Dependent Assistance  2 = A lot, Maximum/Moderate Assistance  3 = A little, Minimum/Contact Guard/Supervision  4 = None, Modified [place]/Independent    Turning over in bed (including adjusting bedclothes, sheets and blankets)?: 4  Sitting down on and standing up from a chair with arms (e.g., wheelchair, bedside commode, etc.):  4  Moving from lying on back to sitting on the side of the bed?: 4  Moving to and from a bed to a chair (including a wheelchair)?: 4  Need to walk in hospital room?: 4  Climbing 3-5 steps with a railing?: 4  Total Score: 24     AM-PAC Raw Score CMS G-Code Modifier Level of Impairment Assistance   6 % Total / Unable   7 - 9 CM 80 - 100% Maximal Assist   10 - 14 CL 60 - 80% Moderate Assist   15 - 19 CK 40 - 60% Moderate Assist   20 - 22 CJ 20 - 40% Minimal Assist   23 CI 1-20% SBA / CGA   24 CH 0% Independent/ Mod I     Patient left supine with call button in reach.    Assessment:   Baltazar Mccray is a 57 y.o. male with a medical diagnosis of COPD exacerbation and presents with impaired endurance and O2 desaturation during ambulation further distances but demonstrates no evidence of respiratory distress and quickly recovers to 90% with deep breathing. Pt demonstrates independence with all functional mobility and does not require skilled PT intervention at this time.    History:COPD exacerbation that impacts POC  Eval of Body Systems: Cardiopulmonary, musculoskeletal, neuromuscular, cognition  Functional Outcome Tools: AMPAC, MMT, ROM, VAS  Clinical Presentation:  Evolving due to brief O2 desat with ambulation  Eval Complexity: Low      Rehab identified problem list/impairments: Rehab identified problem list/impairments: impaired endurance, impaired cardiopulmonary response to activity    Rehab potential is good.    Activity tolerance: Good    Discharge recommendations: Discharge Facility/Level Of Care Needs: home     Barriers to discharge: Barriers to Discharge: None    Equipment recommendations: Equipment Needed After Discharge: none     GOALS:    Physical Therapy Goals     Not on file          Multidisciplinary Problems (Resolved)        Problem: Physical Therapy Goal    Goal Priority Disciplines Outcome Goal Variances Interventions   Physical Therapy Goal   (Resolved)     PT/OT, PT Outcome(s) achieved                      PLAN:    JAIMEE PT  Home with no needs        Danielle Sawant, PT  11/21/2017

## 2017-11-21 NOTE — ED NOTES
"Pt intoxicated. Pt stated "i been coughing and I came here yesterday for it and I still got". "i got all the medications but im drunk and I needed a place to sit down for a minute". Pt got up and walked out of ER stating "i dont want to see no dr".   "

## 2017-11-21 NOTE — SUBJECTIVE & OBJECTIVE
Interval History: no acute events overnight, no new complaints.  Pt states cough and SOB have improved significantly.    Review of Systems   Constitutional: Negative for chills, fatigue and fever.   HENT: Negative for congestion, postnasal drip and sore throat.    Respiratory: Positive for cough and shortness of breath (improved). Negative for chest tightness.    Cardiovascular: Negative for chest pain and leg swelling.   Gastrointestinal: Negative for abdominal pain, nausea and vomiting.   Musculoskeletal: Negative for arthralgias and myalgias.   Skin: Negative for rash and wound.   Neurological: Negative for syncope, weakness and headaches.     Objective:     Vital Signs (Most Recent):  Temp: 96.4 °F (35.8 °C) (11/21/17 1139)  Pulse: 67 (11/21/17 1307)  Resp: 16 (11/21/17 1307)  BP: (!) 162/79 (11/21/17 1139)  SpO2: 99 % (11/21/17 1307) Vital Signs (24h Range):  Temp:  [96.4 °F (35.8 °C)-98.3 °F (36.8 °C)] 96.4 °F (35.8 °C)  Pulse:  [] 67  Resp:  [16-28] 16  SpO2:  [75 %-100 %] 99 %  BP: (138-182)/(74-91) 162/79     Weight: 59.5 kg (131 lb 1.6 oz)  Body mass index is 21.16 kg/m².    Intake/Output Summary (Last 24 hours) at 11/21/17 1406  Last data filed at 11/21/17 0400   Gross per 24 hour   Intake              360 ml   Output                0 ml   Net              360 ml      Physical Exam   Constitutional: He is oriented to person, place, and time. He appears well-developed and well-nourished.  Non-toxic appearance. No distress.   HENT:   Head: Normocephalic and atraumatic.   Eyes: Conjunctivae and lids are normal.   Neck: Normal range of motion, full passive range of motion without pain and phonation normal.   Cardiovascular: Normal rate, regular rhythm, normal heart sounds and intact distal pulses.    Pulmonary/Chest: Effort normal. No stridor. No respiratory distress. He has decreased breath sounds in the right middle field and the left middle field. He has no wheezes.   Abdominal: Soft. Normal  appearance and bowel sounds are normal.   Musculoskeletal: He exhibits no edema.   Neurological: He is alert and oriented to person, place, and time.   Skin: Skin is warm and dry.   Psychiatric: He has a normal mood and affect. His speech is normal and behavior is normal.   Nursing note and vitals reviewed.      Significant Labs: All pertinent labs within the past 24 hours have been reviewed.    Significant Imaging: I have reviewed all pertinent imaging results/findings within the past 24 hours.

## 2017-11-21 NOTE — SUBJECTIVE & OBJECTIVE
Past Medical History:   Diagnosis Date    Asthma     Hypertension     Seizures        History reviewed. No pertinent surgical history.    Review of patient's allergies indicates:  No Known Allergies    No current facility-administered medications on file prior to encounter.      Current Outpatient Prescriptions on File Prior to Encounter   Medication Sig    albuterol 90 mcg/actuation inhaler Inhale 2 puffs into the lungs every 6 (six) hours as needed for Wheezing.    benazepril (LOTENSIN) 40 MG tablet Take 40 mg by mouth once daily.      benzonatate (TESSALON) 200 MG capsule Take 1 capsule (200 mg total) by mouth 3 (three) times daily as needed for Cough.    doxycycline (MONODOX) 100 MG capsule Take 1 capsule (100 mg total) by mouth every 12 (twelve) hours.    fluticasone-vilanterol (BREO) 200-25 mcg/dose DsDv diskus inhaler Inhale 1 puff into the lungs once daily. Controller    hydrochlorothiazide (HYDRODIURIL) 12.5 MG Tab Take 12.5 mg by mouth every 8 (eight) hours.      levetiracetam (KEPPRA) 1000 MG tablet Take 1,000 mg by mouth 2 (two) times daily.      predniSONE (DELTASONE) 20 MG tablet Take 3 tablets (60 mg total) by mouth once daily.    senna-docusate 8.6-50 mg (PERICOLACE) 8.6-50 mg per tablet Take 1 tablet by mouth 2 (two) times daily.    tiotropium (SPIRIVA) 18 mcg inhalation capsule Inhale 1 capsule (18 mcg total) into the lungs once daily. Controller    omeprazole (PRILOSEC) 20 MG capsule Take 1 capsule (20 mg total) by mouth once daily.    [DISCONTINUED] albuterol 90 mcg/actuation inhaler Inhale 1-2 puffs into the lungs every 6 (six) hours as needed for Wheezing. Rescue     Family History     Problem Relation (Age of Onset)    Cancer Father    Hypertension Sister    Stroke Sister, Brother        Social History Main Topics    Smoking status: Current Every Day Smoker     Packs/day: 0.25    Smokeless tobacco: Never Used    Alcohol use No    Drug use: No    Sexual activity: Not on file      Review of Systems   Constitutional: Negative for chills, fatigue and fever.   HENT: Negative for congestion, postnasal drip and sore throat.    Respiratory: Positive for cough and shortness of breath. Negative for chest tightness.    Cardiovascular: Negative for chest pain and leg swelling.   Gastrointestinal: Negative for abdominal pain, nausea and vomiting.   Musculoskeletal: Negative for arthralgias and myalgias.   Skin: Negative for rash and wound.   Neurological: Negative for syncope, weakness and headaches.     Objective:     Vital Signs (Most Recent):  Temp: 98.3 °F (36.8 °C) (11/20/17 2158)  Pulse: 72 (11/20/17 2158)  Resp: 16 (11/20/17 2158)  BP: (!) 182/91 (11/20/17 2158)  SpO2: 99 % (11/20/17 2158) Vital Signs (24h Range):  Temp:  [97.9 °F (36.6 °C)-98.3 °F (36.8 °C)] 98.3 °F (36.8 °C)  Pulse:  [] 72  Resp:  [16-28] 16  SpO2:  [96 %-100 %] 99 %  BP: (138-182)/(84-91) 182/91     Weight: 59 kg (130 lb)  Body mass index is 20.36 kg/m².    Physical Exam   Constitutional: He is oriented to person, place, and time. He appears well-developed and well-nourished.  Non-toxic appearance. No distress.   HENT:   Head: Normocephalic and atraumatic.   Eyes: Conjunctivae and lids are normal.   Neck: Normal range of motion, full passive range of motion without pain and phonation normal.   Cardiovascular: Normal rate, regular rhythm, normal heart sounds and intact distal pulses.    Pulmonary/Chest: Effort normal. No stridor. No respiratory distress. He has decreased breath sounds in the right middle field and the left middle field. He has no wheezes.   Abdominal: Soft. Normal appearance and bowel sounds are normal.   Musculoskeletal: He exhibits no edema.   Neurological: He is alert and oriented to person, place, and time.   Skin: Skin is warm and dry.   Psychiatric: He has a normal mood and affect. His speech is normal and behavior is normal.   Nursing note and vitals reviewed.       Significant Labs:   CBC:      Recent Labs  Lab 11/20/17  1906   WBC 6.75   HGB 14.6   HCT 44.3   *     CMP:     Recent Labs  Lab 11/20/17 1906      K 3.7      CO2 31*   GLU 80   BUN 6   CREATININE 0.8   CALCIUM 9.0   PROT 7.8   ALBUMIN 2.8*   BILITOT 0.2   ALKPHOS 105   AST 94*   ALT 49*   ANIONGAP 9   EGFRNONAA >60.0     All pertinent labs within the past 24 hours have been reviewed.    Significant Imaging: I have reviewed all pertinent imaging results/findings within the past 24 hours.

## 2017-11-21 NOTE — HPI
"Baltazar Mccray is an interesting 57 year-old male with significant past medical history of COPD, HTN, tobacco use, and seizure disorder presents to ED c/o of shortness of breath and dry cough. This is the patient's 3rd visit in the ED within the past week for similar symptoms. After a rather round-about interview, patient states that is only taking antibiotics. He was prescribed steroids, but unclear why pharmacy only delivered 1 bottle of medications. Patient uses his inhaler multiple times a day due to shortness of breath exacerbations and he is unsure which inhaler to use. He is prescribed albuterol, breo and spiriva, but does not understand this regimen. Patient states that he continues to smoke "a couple of cigarettes a night", even though he is aware that smoking would help prevent his symptoms. Patient states that he is complaint with home oxygen and uses it every night, 2L with no increase in oxygen requirements. Patient denies previous DVT or PE, recent surgery or cancer within the past 6 months and hemoptysis. Patient currently denies active fever, chills, wheezing, nasal congestion, sore throat, chest pain, leg swelling, abdominal pain and nausea/vomiting.  "

## 2017-11-21 NOTE — PROGRESS NOTES
"[facility] Hospital Medicine  Progress Note    Patient Name: Baltazar Mccray  MRN: [id number]  Patient Class: OP- Observation   Admission Date: 11/20/2017  Length of Stay: 0 days  Attending Physician: ASRAH Mckinney MD  Primary Care Provider: Primary Doctor [facility] Medicine Team: [facility] F Fernanda Jansen PA-C    Subjective:     Principal Problem:COPD exacerbation    HPI:  Baltazar Mccray is an interesting 57 year-old male with significant past medical history of COPD, HTN, tobacco use, and seizure disorder presents to ED c/o of shortness of breath and dry cough. This is the patient's 3rd visit in the ED within the past week for similar symptoms. After a rather round-about interview, patient states that is only taking antibiotics. He was prescribed steroids, but unclear why pharmacy only delivered 1 bottle of medications. Patient uses his inhaler multiple times a day due to shortness of breath exacerbations and he is unsure which inhaler to use. He is prescribed albuterol, breo and spiriva, but does not understand this regimen. Patient states that he continues to smoke "a couple of cigarettes a night", even though he is aware that smoking would help prevent his symptoms. Patient states that he is complaint with home oxygen and uses it every night, 2L with no increase in oxygen requirements. Patient denies previous DVT or PE, recent surgery or cancer within the past 6 months and hemoptysis. Patient currently denies active fever, chills, wheezing, nasal congestion, sore throat, chest pain, leg swelling, abdominal pain and nausea/vomiting.    Hospital Course:  Pt admitted to observation for COPD exacerbation.  COPD Pathway initiated on admission.  Doxycycline 100 mg po q 12 hrs and prednisone 60 mg daily initiated.    Interval History: no acute events overnight, no new complaints.  Pt states cough and SOB have improved significantly.    Review of Systems   Constitutional: Negative for chills, " fatigue and fever.   HENT: Negative for congestion, postnasal drip and sore throat.    Respiratory: Positive for cough and shortness of breath (improved). Negative for chest tightness.    Cardiovascular: Negative for chest pain and leg swelling.   Gastrointestinal: Negative for abdominal pain, nausea and vomiting.   Musculoskeletal: Negative for arthralgias and myalgias.   Skin: Negative for rash and wound.   Neurological: Negative for syncope, weakness and headaches.     Objective:     Vital Signs (Most Recent):  Temp: 96.4 °F (35.8 °C) (11/21/17 1139)  Pulse: 67 (11/21/17 1307)  Resp: 16 (11/21/17 1307)  BP: (!) 162/79 (11/21/17 1139)  SpO2: 99 % (11/21/17 1307) Vital Signs (24h Range):  Temp:  [96.4 °F (35.8 °C)-98.3 °F (36.8 °C)] 96.4 °F (35.8 °C)  Pulse:  [] 67  Resp:  [16-28] 16  SpO2:  [75 %-100 %] 99 %  BP: (138-182)/(74-91) 162/79     Weight: 59.5 kg (131 lb 1.6 oz)  Body mass index is 21.16 kg/m².    Intake/Output Summary (Last 24 hours) at 11/21/17 1406  Last data filed at 11/21/17 0400   Gross per 24 hour   Intake              360 ml   Output                0 ml   Net              360 ml      Physical Exam   Constitutional: He is oriented to person, place, and time. He appears well-developed and well-nourished.  Non-toxic appearance. No distress.   HENT:   Head: Normocephalic and atraumatic.   Eyes: Conjunctivae and lids are normal.   Neck: Normal range of motion, full passive range of motion without pain and phonation normal.   Cardiovascular: Normal rate, regular rhythm, normal heart sounds and intact distal pulses.    Pulmonary/Chest: Effort normal. No stridor. No respiratory distress. He has decreased breath sounds in the right middle field and the left middle field. He has no wheezes.   Abdominal: Soft. Normal appearance and bowel sounds are normal.   Musculoskeletal: He exhibits no edema.   Neurological: He is alert and oriented to person, place, and time.   Skin: Skin is warm and dry.    Psychiatric: He has a normal mood and affect. His speech is normal and behavior is normal.   Nursing note and vitals reviewed.      Significant Labs: All pertinent labs within the past 24 hours have been reviewed.    Significant Imaging: I have reviewed all pertinent imaging results/findings within the past 24 hours.    Assessment/Plan:      * COPD exacerbation    - COPD pathway initiated   - patient likely not compliant with medications. History is confusing, but he is determined to learn and stop smoking. No PCP. No increased O2 requirements (uses 2L QHS). Saturating well on RA, in no distress. I am hopeful that the persistent education provided by the COPD pathway will help him understand his disease and how to treat it.   - continue doxycycline 100mg PO q12h and prednisone 60mg PO daily (patient did not pick this prescription up from pharmacy?), scheduled nebs  - CXR: Stable  - long discussion with patient about COPD and prevention vs rescue and role of medications and smoking cessation  - Well's score: 0  - 11/21 cough and SOB improving with current regimen        Seizures    - 11/21 stable, continue home dose of Keppra 1000 mg PO BID        Essential hypertension    - uncontrolled on admit, does not take prescribed benazepril  - restart home dose of Benazepril 40mg PO daily  - hydralazine PRN  - 11/21 uncontrolled but improving will continue benazepril          VTE Risk Mitigation         Ordered     enoxaparin injection 40 mg  Daily     Route:  Subcutaneous        11/20/17 2045     High Risk of VTE  Once      11/20/17 2045     High Risk of VTE  Once      11/20/17 2045              Fernanda Jansen PA-C  Department of Hospital Medicine   [facility]

## 2017-11-21 NOTE — H&P
"[facility] Hospital Medicine  History & Physical    Patient Name: Baltazar Mccray  MRN: [id number]  Admission Date: 11/20/2017  Attending Physician: Damir Shipman MD   Primary Care Provider: Primary Doctor [facility] Medicine Team: [facility] MED  Alec Glover PA-C     Patient information was obtained from patient, past medical records and ER records.     Subjective:     Principal Problem:COPD exacerbation    Chief Complaint:   Chief Complaint   Patient presents with    Shortness of Breath     started 30 minutes ago arturo with exertion. also presents with cough. seen here yesterday for same complaint. +etoh        HPI: Baltazar Mccray is an interesting 57 year-old male with significant past medical history of COPD, HTN, tobacco use, and seizure disorder presents to ED c/o of shortness of breath and dry cough. This is the patient's 3rd visit in the ED within the past week for similar symptoms. After a rather round-about interview, patient states that is only taking antibiotics. He was prescribed steroids, but unclear why pharmacy only delivered 1 bottle of medications. Patient uses his inhaler multiple times a day due to shortness of breath exacerbations and he is unsure which inhaler to use. He is prescribed albuterol, breo and spiriva, but does not understand this regimen. Patient states that he continues to smoke "a couple of cigarettes a night", even though he is aware that smoking would help prevent his symptoms. Patient states that he is complaint with home oxygen and uses it every night, 2L with no increase in oxygen requirements. Patient denies previous DVT or PE, recent surgery or cancer within the past 6 months and hemoptysis. Patient currently denies active fever, chills, wheezing, nasal congestion, sore throat, chest pain, leg swelling, abdominal pain and nausea/vomiting.    Past Medical History:   Diagnosis Date    Asthma     Hypertension     Seizures        History reviewed. No " pertinent surgical history.    Review of patient's allergies indicates:  No Known Allergies    No current facility-administered medications on file prior to encounter.      Current Outpatient Prescriptions on File Prior to Encounter   Medication Sig    albuterol 90 mcg/actuation inhaler Inhale 2 puffs into the lungs every 6 (six) hours as needed for Wheezing.    benazepril (LOTENSIN) 40 MG tablet Take 40 mg by mouth once daily.      benzonatate (TESSALON) 200 MG capsule Take 1 capsule (200 mg total) by mouth 3 (three) times daily as needed for Cough.    doxycycline (MONODOX) 100 MG capsule Take 1 capsule (100 mg total) by mouth every 12 (twelve) hours.    fluticasone-vilanterol (BREO) 200-25 mcg/dose DsDv diskus inhaler Inhale 1 puff into the lungs once daily. Controller    hydrochlorothiazide (HYDRODIURIL) 12.5 MG Tab Take 12.5 mg by mouth every 8 (eight) hours.      levetiracetam (KEPPRA) 1000 MG tablet Take 1,000 mg by mouth 2 (two) times daily.      predniSONE (DELTASONE) 20 MG tablet Take 3 tablets (60 mg total) by mouth once daily.    senna-docusate 8.6-50 mg (PERICOLACE) 8.6-50 mg per tablet Take 1 tablet by mouth 2 (two) times daily.    tiotropium (SPIRIVA) 18 mcg inhalation capsule Inhale 1 capsule (18 mcg total) into the lungs once daily. Controller    omeprazole (PRILOSEC) 20 MG capsule Take 1 capsule (20 mg total) by mouth once daily.    [DISCONTINUED] albuterol 90 mcg/actuation inhaler Inhale 1-2 puffs into the lungs every 6 (six) hours as needed for Wheezing. Rescue     Family History     Problem Relation (Age of Onset)    Cancer Father    Hypertension Sister    Stroke Sister, Brother        Social History Main Topics    Smoking status: Current Every Day Smoker     Packs/day: 0.25    Smokeless tobacco: Never Used    Alcohol use No    Drug use: No    Sexual activity: Not on file     Review of Systems   Constitutional: Negative for chills, fatigue and fever.   HENT: Negative for  congestion, postnasal drip and sore throat.    Respiratory: Positive for cough and shortness of breath. Negative for chest tightness.    Cardiovascular: Negative for chest pain and leg swelling.   Gastrointestinal: Negative for abdominal pain, nausea and vomiting.   Musculoskeletal: Negative for arthralgias and myalgias.   Skin: Negative for rash and wound.   Neurological: Negative for syncope, weakness and headaches.     Objective:     Vital Signs (Most Recent):  Temp: 98.3 °F (36.8 °C) (11/20/17 2158)  Pulse: 72 (11/20/17 2158)  Resp: 16 (11/20/17 2158)  BP: (!) 182/91 (11/20/17 2158)  SpO2: 99 % (11/20/17 2158) Vital Signs (24h Range):  Temp:  [97.9 °F (36.6 °C)-98.3 °F (36.8 °C)] 98.3 °F (36.8 °C)  Pulse:  [] 72  Resp:  [16-28] 16  SpO2:  [96 %-100 %] 99 %  BP: (138-182)/(84-91) 182/91     Weight: 59 kg (130 lb)  Body mass index is 20.36 kg/m².    Physical Exam   Constitutional: He is oriented to person, place, and time. He appears well-developed and well-nourished.  Non-toxic appearance. No distress.   HENT:   Head: Normocephalic and atraumatic.   Eyes: Conjunctivae and lids are normal.   Neck: Normal range of motion, full passive range of motion without pain and phonation normal.   Cardiovascular: Normal rate, regular rhythm, normal heart sounds and intact distal pulses.    Pulmonary/Chest: Effort normal. No stridor. No respiratory distress. He has decreased breath sounds in the right middle field and the left middle field. He has no wheezes.   Abdominal: Soft. Normal appearance and bowel sounds are normal.   Musculoskeletal: He exhibits no edema.   Neurological: He is alert and oriented to person, place, and time.   Skin: Skin is warm and dry.   Psychiatric: He has a normal mood and affect. His speech is normal and behavior is normal.   Nursing note and vitals reviewed.       Significant Labs:   CBC:     Recent Labs  Lab 11/20/17 1906   WBC 6.75   HGB 14.6   HCT 44.3   *     CMP:     Recent  Labs  Lab 11/20/17  1906      K 3.7      CO2 31*   GLU 80   BUN 6   CREATININE 0.8   CALCIUM 9.0   PROT 7.8   ALBUMIN 2.8*   BILITOT 0.2   ALKPHOS 105   AST 94*   ALT 49*   ANIONGAP 9   EGFRNONAA >60.0     All pertinent labs within the past 24 hours have been reviewed.    Significant Imaging: I have reviewed all pertinent imaging results/findings within the past 24 hours.    Assessment/Plan:     * COPD exacerbation    - COPD pathway initiated   - patient likely not compliant with medications. History is confusing, but he is determined to learn and stop smoking. No PCP. No increased O2 requirements (uses 2L QHS). Saturating well on RA, in no distress. I am hopeful that the persistent education provided by the COPD pathway will help him understand his disease and how to treat it.   - continue doxycycline 100mg PO q12h and prednisone 60mg PO daily (patient did not pick this prescription up from pharmacy?), scheduled nebs  - CXR: Stable  - long discussion with patient about COPD and prevention vs rescue and role of medications and smoking cessation  - Well's score: 0        Essential hypertension    - uncontrolled on admit, does not take prescribed benazepril  - restart home dose of Benazepril 40mg PO daily  - hydralazine PRN        Seizures    - continue home dose of Keppra 1000 mg PO BID          VTE Risk Mitigation         Ordered     enoxaparin injection 40 mg  Daily     Route:  Subcutaneous        11/20/17 2045     High Risk of VTE  Once      11/20/17 2045     High Risk of VTE  Once      11/20/17 2045             Alec Glover PA-C  Department of Hospital Medicine   [facility]

## 2017-11-21 NOTE — ASSESSMENT & PLAN NOTE
- uncontrolled on admit, does not take prescribed benazepril  - restart home dose of Benazepril 40mg PO daily  - hydralazine PRN

## 2017-11-21 NOTE — ED PROVIDER NOTES
Encounter Date: 11/20/2017    SCRIBE #1 NOTE: I, Tara Causey, am scribing for, and in the presence of,  Dr. Aguirre. I have scribed the following portions of the note - the APC attestation.       History     Chief Complaint   Patient presents with    Shortness of Breath     started 30 minutes ago arturo with exertion. also presents with cough. seen here yesterday for same complaint. +etoh     This is a 57 year old male with a PMH of COPD, HTN, asthma, seizure disorder who presents to the ED with a chief complaint of shortness of breath. He reports a 4 day history of shortness of breath, chest tightness, and wheezing. He requires use of his inhaler 3-4 times daily and frequently at night. He uses home oxygen at night, 2L via [place], without recent need for adjustment. He is unsure of which inhaler he is using; he is prescribed albuterol, Breo, and Spiriva. Patient was seen twice in the ED this week for similar complaints and was started on prednisone and doxycycline without significant improvement. History is limited by patient's poor insight into his condition and he is a bit difficult to follow with tangential speech. His compliance is questionable. He denies fever chills, chest pain, nasal congestion or drainage, nausea/vomiting, abdominal pain, dysuria or peripheral edema. He is a smoker and endorses daily alcohol use (1-2 beers). His last drink was yesterday. 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           Review of patient's allergies indicates:  No Known Allergies  Past Medical History:   Diagnosis Date    Asthma     Hypertension     Seizures      History reviewed. No pertinent surgical history.  Family History   Problem Relation Age of Onset    Cancer Father     Hypertension Sister     Stroke Sister     Stroke Brother     Diabetes Neg Hx     Heart disease Neg Hx      Social History   Substance Use Topics    Smoking status:  Current Every Day Smoker     Packs/day: 0.25    Smokeless tobacco: Never Used    Alcohol use No     Review of Systems   Constitutional: Negative for chills and fever.   HENT: Negative for congestion and sore throat.    Respiratory: Positive for cough, chest tightness, shortness of breath and wheezing.    Cardiovascular: Negative for chest pain and leg swelling.   Gastrointestinal: Negative for nausea and vomiting.   Genitourinary: Negative for dysuria.   Musculoskeletal: Negative for back pain.   Skin: Negative for rash.   Neurological: Negative for weakness.   Hematological: Does not bruise/bleed easily.       Physical Exam     Initial Vitals [11/20/17 1646]   BP Pulse Resp Temp SpO2   (!) 177/90 (!) 112 (!) 28 97.9 °F (36.6 °C) 97 %      MAP       119         Physical Exam    Constitutional: He appears well-developed and well-nourished.   HENT:   Head: Atraumatic.   Eyes: Conjunctivae and EOM are normal. Pupils are equal, round, and reactive to light.   Cardiovascular: Normal rate, regular rhythm and normal heart sounds.   Pulmonary/Chest: No accessory muscle usage. Tachypnea noted. He has decreased breath sounds. He has wheezes.   Abdominal: Soft. Bowel sounds are normal. There is no tenderness.   Neurological: He is alert and oriented to person, place, and time.   Skin: Skin is warm and dry. No rash noted.   Psychiatric: His speech is tangential.         ED Course   Procedures  Labs Reviewed   CBC W/ AUTO DIFFERENTIAL - Abnormal; Notable for the following:        Result Value    RDW 15.7 (*)     Platelets 421 (*)     All other components within normal limits   COMPREHENSIVE METABOLIC PANEL - Abnormal; Notable for the following:     CO2 31 (*)     Albumin 2.8 (*)     AST 94 (*)     ALT 49 (*)     All other components within normal limits   TROPONIN I   POCT GLUCOSE MONITORING CONTINUOUS     EKG Readings: (Independently Interpreted)   Initial Reading: No STEMI.   Sinus rhythm at 81     Imaging Results           X-Ray Chest PA And Lateral (Final result)  Result time 11/20/17 19:37:17    Final result by Oliver Barrientos MD (11/20/17 19:37:17)                 Impression:     Stable appearance of subtle airspace disease at the left costophrenic angle. No worsening air space disease or new acute abnormality.      Electronically signed by: Oliver Barrientos  Date:     11/20/17  Time:    19:37              Narrative:    EXAM:  2 VIEW CHEST RADIOGRAPH.     CLINICAL INDICATION:  Cough.    COMPARISON: 11/19/2017.    TECHNIQUE:  Two views of the chest were obtained.     FINDINGS: Cardiac silhouette is normal in size.  Persistent subtle air space opacities at the left lateral lung base which could reflect pneumonia, aspiration, or subsegmental atelectasis. This finding was not present on 05/14/2017.  No pleural effusion or pneumothorax.  No acute bony abnormality is identified.                              X-Rays:   Independently Interpreted Readings:   Other Readings:  : No acute process    Medical Decision Making:   History:   Old Medical Records: I decided to obtain old medical records.  Clinical Tests:   Lab Tests: Ordered and Reviewed  Radiological Study: Ordered and Reviewed  Medical Tests: Ordered and Reviewed       APC / Resident Notes:   57 year old male presents to the ED with SOB, chest tightness, and wheezing consistent with COPD exacerbation. This is his 5th visit to the ED this month for this. He reports compliance with doxycyline and prednisone prescribed at last visit, but continues to worsen. Will admit to observation for scheduled Duonebs. Labs and CXR pending. I discussed the care of this patient with my supervising MD.        Scribe Attestation:   Scribe #1: I performed the above scribed service and the documentation accurately describes the services I performed. I attest to the accuracy of the note.    Attending Attestation:     Physician Attestation Statement for NP/PA:   I discussed this assessment and plan of  this patient with the NP/PA, but I did not personally examine the patient. The face to face encounter was performed by the NP/PA.    Other NP/PA Attestation Additions:      Medical Decision Makin y.o. Male with hx COPD presenting for the fifth time to ED with uncontrolled acute exacerbation.  Pt treated with duo nebs and steroids.  Will admit for further treatment and evaluation.       Physician Attestation for Scribe:      Comments: I, Dr. Trena Aguirre, personally performed the services described in this documentation. All medical record entries made by the scribe were at my direction and in my presence.  I have reviewed the chart and agree that the record reflects my personal performance and is accurate and complete. Trena Aguirre MD.              ED Course      Clinical Impression:   The primary encounter diagnosis was COPD exacerbation. Diagnoses of Cough and Shortness of breath were also pertinent to this visit.    Disposition:   Disposition: Placed in Observation  Condition: Stable                        DUNG MichaelC  17 [id number]       Trena Aguirre MD  17 [phone]       Trena Aguirre MD  17 193

## 2017-11-22 VITALS
BODY MASS INDEX: 21.07 KG/M2 | HEIGHT: 66 IN | SYSTOLIC BLOOD PRESSURE: 170 MMHG | WEIGHT: 131.13 LBS | OXYGEN SATURATION: 95 % | RESPIRATION RATE: 18 BRPM | DIASTOLIC BLOOD PRESSURE: 81 MMHG | HEART RATE: 80 BPM | TEMPERATURE: 98 F

## 2017-11-22 LAB
ANION GAP SERPL CALC-SCNC: 9 MMOL/L
BASOPHILS # BLD AUTO: 0.01 K/UL
BASOPHILS NFR BLD: 0.2 %
BUN SERPL-MCNC: 10 MG/DL
CALCIUM SERPL-MCNC: 8.7 MG/DL
CHLORIDE SERPL-SCNC: 98 MMOL/L
CO2 SERPL-SCNC: 29 MMOL/L
CREAT SERPL-MCNC: 0.8 MG/DL
DIFFERENTIAL METHOD: ABNORMAL
EOSINOPHIL # BLD AUTO: 0 K/UL
EOSINOPHIL NFR BLD: 0 %
ERYTHROCYTE [DISTWIDTH] IN BLOOD BY AUTOMATED COUNT: 15.2 %
EST. GFR  (AFRICAN AMERICAN): >60 ML/MIN/1.73 M^2
EST. GFR  (NON AFRICAN AMERICAN): >60 ML/MIN/1.73 M^2
GLUCOSE SERPL-MCNC: 132 MG/DL
HCT VFR BLD AUTO: 41.1 %
HGB BLD-MCNC: 13.6 G/DL
IMM GRANULOCYTES # BLD AUTO: 0.02 K/UL
IMM GRANULOCYTES NFR BLD AUTO: 0.4 %
LYMPHOCYTES # BLD AUTO: 0.3 K/UL
LYMPHOCYTES NFR BLD: 5.7 %
MCH RBC QN AUTO: 26.9 PG
MCHC RBC AUTO-ENTMCNC: 33.1 G/DL
MCV RBC AUTO: 81 FL
MONOCYTES # BLD AUTO: 0.1 K/UL
MONOCYTES NFR BLD: 2.3 %
NEUTROPHILS # BLD AUTO: 4.9 K/UL
NEUTROPHILS NFR BLD: 91.4 %
NRBC BLD-RTO: 0 /100 WBC
PLATELET # BLD AUTO: 368 K/UL
PMV BLD AUTO: 10.4 FL
POCT GLUCOSE: 115 MG/DL (ref 70–110)
POTASSIUM SERPL-SCNC: 4.4 MMOL/L
RBC # BLD AUTO: 5.06 M/UL
SODIUM SERPL-SCNC: 136 MMOL/L
WBC # BLD AUTO: 5.3 K/UL

## 2017-11-22 PROCEDURE — 25000003 PHARM REV CODE 250: Performed by: PHYSICIAN ASSISTANT

## 2017-11-22 PROCEDURE — G0378 HOSPITAL OBSERVATION PER HR: HCPCS

## 2017-11-22 PROCEDURE — 63600175 PHARM REV CODE 636 W HCPCS: Performed by: PHYSICIAN ASSISTANT

## 2017-11-22 PROCEDURE — 90471 IMMUNIZATION ADMIN: CPT | Performed by: HOSPITALIST

## 2017-11-22 PROCEDURE — 99225 PR SUBSEQUENT OBSERVATION CARE,LEVEL II: CPT | Mod: ,,, | Performed by: PHYSICIAN ASSISTANT

## 2017-11-22 PROCEDURE — 94640 AIRWAY INHALATION TREATMENT: CPT

## 2017-11-22 PROCEDURE — 90686 IIV4 VACC NO PRSV 0.5 ML IM: CPT | Performed by: HOSPITALIST

## 2017-11-22 PROCEDURE — 25000242 PHARM REV CODE 250 ALT 637 W/ HCPCS: Performed by: PHYSICIAN ASSISTANT

## 2017-11-22 PROCEDURE — 63600175 PHARM REV CODE 636 W HCPCS: Performed by: HOSPITALIST

## 2017-11-22 PROCEDURE — 85025 COMPLETE CBC W/AUTO DIFF WBC: CPT

## 2017-11-22 PROCEDURE — 80048 BASIC METABOLIC PNL TOTAL CA: CPT

## 2017-11-22 PROCEDURE — S4991 NICOTINE PATCH NONLEGEND: HCPCS | Performed by: PHYSICIAN ASSISTANT

## 2017-11-22 PROCEDURE — 27000221 HC OXYGEN, UP TO 24 HOURS

## 2017-11-22 PROCEDURE — 90472 IMMUNIZATION ADMIN EACH ADD: CPT | Performed by: HOSPITALIST

## 2017-11-22 PROCEDURE — 90670 PCV13 VACCINE IM: CPT | Performed by: HOSPITALIST

## 2017-11-22 PROCEDURE — 36415 COLL VENOUS BLD VENIPUNCTURE: CPT

## 2017-11-22 RX ORDER — DOXYCYCLINE 100 MG/1
100 CAPSULE ORAL EVERY 12 HOURS
Qty: 5 CAPSULE | Refills: 0
Start: 2017-11-22 | End: 2017-11-25

## 2017-11-22 RX ORDER — PREDNISONE 20 MG/1
40 TABLET ORAL 2 TIMES DAILY
Qty: 12 TABLET | Refills: 0 | Status: SHIPPED | OUTPATIENT
Start: 2017-11-22 | End: 2017-11-25

## 2017-11-22 RX ADMIN — BENAZEPRIL HYDROCHLORIDE 40 MG: 20 TABLET, FILM COATED ORAL at 08:11

## 2017-11-22 RX ADMIN — DOXYCYCLINE 100 MG: 50 CAPSULE ORAL at 08:11

## 2017-11-22 RX ADMIN — CALCIUM CARBONATE (ANTACID) CHEW TAB 500 MG 500 MG: 500 CHEW TAB at 05:11

## 2017-11-22 RX ADMIN — PANTOPRAZOLE SODIUM 40 MG: 40 TABLET, DELAYED RELEASE ORAL at 08:11

## 2017-11-22 RX ADMIN — INFLUENZA A VIRUS A/MICHIGAN/45/2015 X-275 (H1N1) ANTIGEN (FORMALDEHYDE INACTIVATED), INFLUENZA A VIRUS A/HONG KONG/4801/2014 X-263B (H3N2) ANTIGEN (FORMALDEHYDE INACTIVATED), INFLUENZA B VIRUS B/PHUKET/3073/2013 ANTIGEN (FORMALDEHYDE INACTIVATED), AND INFLUENZA B VIRUS B/BRISBANE/60/2008 ANTIGEN (FORMALDEHYDE INACTIVATED) 0.5 ML: 15; 15; 15; 15 INJECTION, SUSPENSION INTRAMUSCULAR at 10:11

## 2017-11-22 RX ADMIN — IPRATROPIUM BROMIDE AND ALBUTEROL SULFATE 3 ML: .5; 3 SOLUTION RESPIRATORY (INHALATION) at 04:11

## 2017-11-22 RX ADMIN — NICOTINE 1 PATCH: 14 PATCH, EXTENDED RELEASE TRANSDERMAL at 08:11

## 2017-11-22 RX ADMIN — LEVETIRACETAM 1000 MG: 500 TABLET ORAL at 08:11

## 2017-11-22 RX ADMIN — IPRATROPIUM BROMIDE AND ALBUTEROL SULFATE 3 ML: .5; 3 SOLUTION RESPIRATORY (INHALATION) at 07:11

## 2017-11-22 RX ADMIN — FLUTICASONE FUROATE AND VILANTEROL TRIFENATATE 1 PUFF: 200; 25 POWDER RESPIRATORY (INHALATION) at 08:11

## 2017-11-22 RX ADMIN — IPRATROPIUM BROMIDE AND ALBUTEROL SULFATE 3 ML: .5; 3 SOLUTION RESPIRATORY (INHALATION) at 12:11

## 2017-11-22 RX ADMIN — TIOTROPIUM BROMIDE 18 MCG: 18 CAPSULE ORAL; RESPIRATORY (INHALATION) at 08:11

## 2017-11-22 RX ADMIN — PNEUMOCOCCAL 13-VALENT CONJUGATE VACCINE 0.5 ML: 2.2; 2.2; 2.2; 2.2; 2.2; 4.4; 2.2; 2.2; 2.2; 2.2; 2.2; 2.2; 2.2 INJECTION, SUSPENSION INTRAMUSCULAR at 10:11

## 2017-11-22 RX ADMIN — PREDNISONE 40 MG: 20 TABLET ORAL at 08:11

## 2017-11-22 RX ADMIN — STANDARDIZED SENNA CONCENTRATE AND DOCUSATE SODIUM 1 TABLET: 8.6; 5 TABLET, FILM COATED ORAL at 08:11

## 2017-11-22 NOTE — DISCHARGE SUMMARY
"[facility] Hospital Medicine  Discharge Summary      Patient Name: Baltazar Mccray  MRN: [id number]  Admission Date: 11/20/2017  Hospital Length of Stay: 0 days  Discharge Date and Time:  11/22/2017 1:17 PM  Attending Physician: Carina att. providers found   Discharging Provider: Fernanda Jansen PA-C  Primary Care Provider: Rick Hsieh MD  [facility] Medicine Team: [facility] MED F Fernanda Jnasen PA-C    HPI:   Baltazar Mccray is an interesting 57 year-old male with significant past medical history of COPD, HTN, tobacco use, and seizure disorder presents to ED c/o of shortness of breath and dry cough. This is the patient's 3rd visit in the ED within the past week for similar symptoms. After a rather round-about interview, patient states that is only taking antibiotics. He was prescribed steroids, but unclear why pharmacy only delivered 1 bottle of medications. Patient uses his inhaler multiple times a day due to shortness of breath exacerbations and he is unsure which inhaler to use. He is prescribed albuterol, breo and spiriva, but does not understand this regimen. Patient states that he continues to smoke "a couple of cigarettes a night", even though he is aware that smoking would help prevent his symptoms. Patient states that he is complaint with home oxygen and uses it every night, 2L with no increase in oxygen requirements. Patient denies previous DVT or PE, recent surgery or cancer within the past 6 months and hemoptysis. Patient currently denies active fever, chills, wheezing, nasal congestion, sore throat, chest pain, leg swelling, abdominal pain and nausea/vomiting.    * No surgery found *      Hospital Course:   Pt admitted to observation for COPD exacerbation.  COPD Pathway initiated on admission.  Doxycycline 100 mg po q 12 hrs (started prior to admission) and prednisone 40 mg daily initiated.  PT/OT evaluated the patient and no needs (home).  During hospital course, pt received 3 days of " prednisone and will continue prednisone at discharge for additional 2 days.  Pt also received doxycycline 4 doses/2 days during hospitalization and took 1 day worth prior to admission.  Pt to take additional 2 days of doxycycline at discharge to finish 5 day course.  Pt to continue keppra 1000 mg po bid for seizures (stable during hospital course).  Pt to continue benazepril 40 mg daily for HTN (pt noncompliant prior to admission).     Consults:   Consults         Status Ordering Provider     Inpatient consult to Social Work/Case Management  Once     Provider:  (Not yet assigned)    Completed DANUTA COLE new Assessment & Plan notes have been filed under this hospital service since the last note was generated.  Service: Hospital Medicine    Final Active Diagnoses:    Diagnosis Date Noted POA    PRINCIPAL PROBLEM:  COPD exacerbation [J44.1] 05/14/2017 Yes    Seizures [R56.9] 11/20/2017 Yes    Essential hypertension [I10] 05/19/2017 Yes      Problems Resolved During this Admission:    Diagnosis Date Noted Date Resolved POA       Discharged Condition: good    Disposition: Home or Self Care    Follow Up:  Follow-up Information     Rick Hsieh MD On 11/30/2017.    Specialty:  Family Medicine  Why:  at 9:00 AM  Contact information:  [address]  [facility] [address]  [phone]                 Patient Instructions:     Ambulatory referral to Internal Medicine   Referral Priority: Routine Referral Type: Consultation   Referral Reason: Specialty Services Required    Requested Specialty: Internal Medicine    Number of Visits Requested: 1      Ambulatory Referral to IM Priority [facility]   Referral Priority: Routine Referral Type: Consultation   Referral Reason: Specialty Services Required    Number of Visits Requested: 1      Ambulatory referral to Pulmonology   Referral Priority: Routine Referral Type: Consultation   Referral Reason: Specialty Services Required     Requested Specialty: Pulmonary Disease    Number of Visits Requested: 1      Ambulatory Referral to Pulmonary Rehab   Referral Priority: Routine Referral Type: Consultation   Referral Reason: Specialty Services Required    Requested Specialty: Pulmonary Disease    Number of Visits Requested: 1      Diet general     Activity as tolerated         Significant Diagnostic Studies: Labs: All labs within the past 24 hours have been reviewed    Pending Diagnostic Studies:     None         Medications:  Reconciled Home Medications:   Discharge Medication List as of 11/22/2017 10:55 AM      CONTINUE these medications which have CHANGED    Details   doxycycline (MONODOX) 100 MG capsule Take 1 capsule (100 mg total) by mouth every 12 (twelve) hours., Starting Wed 11/22/2017, Until Sat 11/25/2017, No Print      predniSONE (DELTASONE) 20 MG tablet Take 2 tablets (40 mg total) by mouth 2 (two) times daily., Starting Wed 11/22/2017, Until Sat 11/25/2017, Normal         CONTINUE these medications which have NOT CHANGED    Details   albuterol 90 mcg/actuation inhaler Inhale 2 puffs into the lungs every 6 (six) hours as needed for Wheezing., Starting 5/19/2017, Until Discontinued, Normal      benazepril (LOTENSIN) 40 MG tablet Take 40 mg by mouth once daily.  , Until Discontinued, Historical Med      benzonatate (TESSALON) 200 MG capsule Take 1 capsule (200 mg total) by mouth 3 (three) times daily as needed for Cough., Starting 5/19/2017, Until Discontinued, Normal      fluticasone-vilanterol (BREO) 200-25 mcg/dose DsDv diskus inhaler Inhale 1 puff into the lungs once daily. Controller, Starting 5/19/2017, Until Discontinued, Normal      levetiracetam (KEPPRA) 1000 MG tablet Take 1,000 mg by mouth 2 (two) times daily.  , Until Discontinued, Historical Med      senna-docusate 8.6-50 mg (PERICOLACE) 8.6-50 mg per tablet Take 1 tablet by mouth 2 (two) times daily., Starting 5/19/2017, Until Discontinued, OTC      tiotropium (SPIRIVA)  18 mcg inhalation capsule Inhale 1 capsule (18 mcg total) into the lungs once daily. Controller, Starting 5/19/2017, Until Sat 5/19/18, Normal      omeprazole (PRILOSEC) 20 MG capsule Take 1 capsule (20 mg total) by mouth once daily., Starting 3/23/2014, Until Mon 3/23/15, Print             Indwelling Lines/Drains at time of discharge:   Lines/Drains/Airways          No matching active lines, drains, or airways          Time spent on the discharge of patient: >30 minutes  Patient was seen and examined on the date of discharge and determined to be suitable for discharge.     Addendum: pt called and notified prednisone dose is 40 mg daily for additional two days (11/23 and 11/24).    Fernanda Jansen PA-C  Department of Hospital Medicine  [facility]

## 2017-11-22 NOTE — PLAN OF CARE
Problem: Patient Care Overview  Goal: Plan of Care Review  Outcome: Outcome(s) achieved Date Met: 11/22/17  Plan of care reviewed with pt . Pt is awake ,alert and oriented x 4 . Stable v/S. No C/O pain during the day . Pt is able to ambulate in the room independently . Pt is free of falls and injuries. No C/O SOB . Medications given as scheduled . Pt D/C to home today .      Problem: Fall Risk (Adult)  Goal: Absence of Falls  Patient will demonstrate the desired outcomes by discharge/transition of care.   Outcome: Outcome(s) achieved Date Met: 11/22/17  Pt is free of falls and injuries prior to discharge . Fall precautions maintained .

## 2017-11-22 NOTE — DISCHARGE SUMMARY
"[facility] Hospital Medicine  Discharge Summary      Patient Name: Baltazar Mccray  MRN: [id number]  Admission Date: 11/20/2017  Hospital Length of Stay: 0 days  Discharge Date and Time:  11/22/2017 9:58 AM  Attending Physician: SARAH Mckinney MD   Discharging Provider: Fernanda Jansen PA-C  Primary Care Provider: Rick Hsieh MD  [facility] Medicine Team: [facility] Fernanda Jansen PA-C    HPI:   Baltazar Mccray is an interesting 57 year-old male with significant past medical history of COPD, HTN, tobacco use, and seizure disorder presents to ED c/o of shortness of breath and dry cough. This is the patient's 3rd visit in the ED within the past week for similar symptoms. After a rather round-about interview, patient states that is only taking antibiotics. He was prescribed steroids, but unclear why pharmacy only delivered 1 bottle of medications. Patient uses his inhaler multiple times a day due to shortness of breath exacerbations and he is unsure which inhaler to use. He is prescribed albuterol, breo and spiriva, but does not understand this regimen. Patient states that he continues to smoke "a couple of cigarettes a night", even though he is aware that smoking would help prevent his symptoms. Patient states that he is complaint with home oxygen and uses it every night, 2L with no increase in oxygen requirements. Patient denies previous DVT or PE, recent surgery or cancer within the past 6 months and hemoptysis. Patient currently denies active fever, chills, wheezing, nasal congestion, sore throat, chest pain, leg swelling, abdominal pain and nausea/vomiting.    * No surgery found *      Hospital Course:   Pt admitted to observation for COPD exacerbation.  COPD Pathway initiated on admission.  Doxycycline 100 mg po q 12 hrs (started prior to admission) and prednisone 80 mg daily initiated.  PT/OT evaluated the patient and no needs (home).  During hospital course, pt received 4 doses/2 days of " prednisone and will continue prednisone at discharge for additional 3 days.  Pt also received doxycycline 4 doses/2 days during hospitalization and took 1 day worth prior to admission.  Pt to take additional 2 days of doxycycline at discharge to finish 5 day course.  Pt to continue keppra 1000 mg po bid for seizures (stable during hospital course).  Pt to continue benazepril 40 mg daily for HTN (pt noncompliant prior to admission).     Consults:   Consults         Status Ordering Provider     Inpatient consult to Social Work/Case Management  Once     Provider:  (Not yet assigned)    Completed DANUTA COLE     IP consult to dietary  Once     Provider:  (Not yet assigned)    Acknowledged JUNO BERMUDEZ          No new Assessment & Plan notes have been filed under this hospital service since the last note was generated.  Service: Hospital Medicine    Final Active Diagnoses:    Diagnosis Date Noted POA    PRINCIPAL PROBLEM:  COPD exacerbation [J44.1] 05/14/2017 Yes    Seizures [R56.9] 11/20/2017 Yes    Essential hypertension [I10] 05/19/2017 Yes      Problems Resolved During this Admission:    Diagnosis Date Noted Date Resolved POA       Discharged Condition: good    Disposition: Home or Self Care    Follow Up:  Follow-up Information     Rick Hsieh MD On 11/30/2017.    Specialty:  Family Medicine  Why:  at 9:00 AM  Contact information:  [address]  [facility] [address]  [phone]                 Patient Instructions:     Ambulatory referral to Internal Medicine   Referral Priority: Routine Referral Type: Consultation   Referral Reason: Specialty Services Required    Requested Specialty: Internal Medicine    Number of Visits Requested: 1      Ambulatory Referral to  Priority Clinic   Referral Priority: Routine Referral Type: Consultation   Referral Reason: Specialty Services Required    Number of Visits Requested: 1      Ambulatory referral to Pulmonology   Referral  Priority: Routine Referral Type: Consultation   Referral Reason: Specialty Services Required    Requested Specialty: Pulmonary Disease    Number of Visits Requested: 1      Ambulatory Referral to Pulmonary Rehab   Referral Priority: Routine Referral Type: Consultation   Referral Reason: Specialty Services Required    Requested Specialty: Pulmonary Disease    Number of Visits Requested: 1      Diet general     Activity as tolerated         Significant Diagnostic Studies: Labs: All labs within the past 24 hours have been reviewed    Pending Diagnostic Studies:     None         Medications:  Reconciled Home Medications:   Current Discharge Medication List      CONTINUE these medications which have CHANGED    Details   doxycycline (MONODOX) 100 MG capsule Take 1 capsule (100 mg total) by mouth every 12 (twelve) hours.  Qty: 5 capsule, Refills: 0      predniSONE (DELTASONE) 20 MG tablet Take 2 tablets (40 mg total) by mouth 2 (two) times daily.  Qty: 12 tablet, Refills: 0         CONTINUE these medications which have NOT CHANGED    Details   albuterol 90 mcg/actuation inhaler Inhale 2 puffs into the lungs every 6 (six) hours as needed for Wheezing.  Qty: 1 each, Refills: 11      benazepril (LOTENSIN) 40 MG tablet Take 40 mg by mouth once daily.        benzonatate (TESSALON) 200 MG capsule Take 1 capsule (200 mg total) by mouth 3 (three) times daily as needed for Cough.  Qty: 60 capsule, Refills: 0      fluticasone-vilanterol (BREO) 200-25 mcg/dose DsDv diskus inhaler Inhale 1 puff into the lungs once daily. Controller  Qty: 14 each, Refills: 3      levetiracetam (KEPPRA) 1000 MG tablet Take 1,000 mg by mouth 2 (two) times daily.        senna-docusate 8.6-50 mg (PERICOLACE) 8.6-50 mg per tablet Take 1 tablet by mouth 2 (two) times daily.      tiotropium (SPIRIVA) 18 mcg inhalation capsule Inhale 1 capsule (18 mcg total) into the lungs once daily. Controller  Qty: 30 capsule, Refills: 11      omeprazole (PRILOSEC) 20 MG  capsule Take 1 capsule (20 mg total) by mouth once daily.  Qty: 30 capsule, Refills: 1             Indwelling Lines/Drains at time of discharge:   Lines/Drains/Airways          No matching active lines, drains, or airways          Time spent on the discharge of patient: >30 minutes  Patient was seen and examined on the date of discharge and determined to be suitable for discharge.         Fernanda Jansen PA-C  Department of Hospital Medicine  [facility]

## 2017-11-22 NOTE — PLAN OF CARE
Problem: Patient Care Overview  Goal: Plan of Care Review  Outcome: Ongoing (interventions implemented as appropriate)  Pt remains free from falls and injury this shift. Pt received breathing treatments as scheduled from repiratory. Pt receiving 2L oxygen via [place]. Sating 92-98%. Lungs: Clear via auscultation. Pt. Provided with calcium carbide for indigestion at beginning of shift with mild relief. Pt states at this time indigestion is much better. Bed low and locked, Call light within reach.

## 2017-11-22 NOTE — PROGRESS NOTES
Pt is ready for discharge per MD order . Pt is A,A,O x 4 . Stable V/S . No C/O pain . Pt is free of falls and injuries . Flu and pneumonia vaccines given as MD order . Pt instructed to wait 15 min after the vaccine . ADT nurse at the bedside to complete the discharge processes .

## 2017-11-22 NOTE — PROGRESS NOTES
D/c instructions reviewed and given.Pt verbalized understanding. PIV removed with catheter intact. Pt ref hosp transport, seen ambulating off unit alone

## 2017-11-22 NOTE — PLAN OF CARE
11/22/2017      Baltazar Mccray  [address] [facility] [address]          [facility] Medicine Dept.  [facility] [address] [facility] [id number] [phone] after hours  [phone] fax Baltazar Mccray has been hospitalized at the [facility] since 11/20/2017.  Please excuse the patient from duties.  Patient may return on 11/24/2017.  No restrictions.     Please contact me if you have any questions.                  __________________________  Fernanda Jansen PA-C  11/22/2017

## 2017-11-27 NOTE — PLAN OF CARE
11/27/17 0824   Final Note   Assessment Type Final Discharge Note     Patient discharged home with no needs 11/22/17. Discharge summary faxed to Dr. Rick Hsieh (PCP) at [facility] (f) [phone].

## 2018-02-04 ENCOUNTER — HOSPITAL ENCOUNTER (EMERGENCY)
Facility: OTHER | Age: 58
Discharge: HOME | End: 2018-02-04
Attending: EMERGENCY MEDICINE
Payer: MEDICAID

## 2018-02-04 VITALS
DIASTOLIC BLOOD PRESSURE: 91 MMHG | SYSTOLIC BLOOD PRESSURE: 167 MMHG | OXYGEN SATURATION: 91 % | WEIGHT: 140 LBS | BODY MASS INDEX: 21.22 KG/M2 | HEART RATE: 68 BPM | RESPIRATION RATE: 18 BRPM | HEIGHT: 68 IN | TEMPERATURE: 98 F

## 2018-02-04 DIAGNOSIS — J18.9 PNEUMONIA DUE TO INFECTIOUS ORGANISM, UNSPECIFIED LATERALITY, UNSPECIFIED PART OF LUNG: Primary | ICD-10-CM

## 2018-02-04 DIAGNOSIS — R07.9 CHEST PAIN: ICD-10-CM

## 2018-02-04 DIAGNOSIS — S29.011A MUSCLE STRAIN OF CHEST WALL, INITIAL ENCOUNTER: ICD-10-CM

## 2018-02-04 DIAGNOSIS — Z72.0 TOBACCO ABUSE: ICD-10-CM

## 2018-02-04 DIAGNOSIS — J98.4 PULMONARY LESION, LEFT: ICD-10-CM

## 2018-02-04 LAB
ALBUMIN SERPL BCP-MCNC: 3.3 G/DL
ALP SERPL-CCNC: 123 U/L
ALT SERPL W/O P-5'-P-CCNC: 66 U/L
ANION GAP SERPL CALC-SCNC: 13 MMOL/L
AST SERPL-CCNC: 55 U/L
BACTERIA #/AREA URNS HPF: NORMAL /HPF
BASOPHILS # BLD AUTO: 0.05 K/UL
BASOPHILS NFR BLD: 0.6 %
BILIRUB SERPL-MCNC: 0.7 MG/DL
BILIRUB UR QL STRIP: NEGATIVE
BUN SERPL-MCNC: 6 MG/DL
CALCIUM SERPL-MCNC: 8.7 MG/DL
CHLORIDE SERPL-SCNC: 101 MMOL/L
CLARITY UR: CLEAR
CO2 SERPL-SCNC: 24 MMOL/L
COLOR UR: YELLOW
CREAT SERPL-MCNC: 0.7 MG/DL
DIFFERENTIAL METHOD: ABNORMAL
EOSINOPHIL # BLD AUTO: 0.1 K/UL
EOSINOPHIL NFR BLD: 0.9 %
ERYTHROCYTE [DISTWIDTH] IN BLOOD BY AUTOMATED COUNT: 16 %
EST. GFR  (AFRICAN AMERICAN): >60 ML/MIN/1.73 M^2
EST. GFR  (NON AFRICAN AMERICAN): >60 ML/MIN/1.73 M^2
GLUCOSE SERPL-MCNC: 93 MG/DL
GLUCOSE UR QL STRIP: NEGATIVE
HCT VFR BLD AUTO: 41.1 %
HGB BLD-MCNC: 13.5 G/DL
HGB UR QL STRIP: NEGATIVE
HYALINE CASTS #/AREA URNS LPF: 0 /LPF
KETONES UR QL STRIP: NEGATIVE
LEUKOCYTE ESTERASE UR QL STRIP: NEGATIVE
LYMPHOCYTES # BLD AUTO: 1.3 K/UL
LYMPHOCYTES NFR BLD: 14.6 %
MCH RBC QN AUTO: 27.7 PG
MCHC RBC AUTO-ENTMCNC: 32.8 G/DL
MCV RBC AUTO: 84 FL
MICROSCOPIC COMMENT: NORMAL
MONOCYTES # BLD AUTO: 0.9 K/UL
MONOCYTES NFR BLD: 10.9 %
NEUTROPHILS # BLD AUTO: 6.3 K/UL
NEUTROPHILS NFR BLD: 72.8 %
NITRITE UR QL STRIP: NEGATIVE
PH UR STRIP: 5 [PH] (ref 5–8)
PLATELET # BLD AUTO: 344 K/UL
PMV BLD AUTO: 9.8 FL
POTASSIUM SERPL-SCNC: 4.1 MMOL/L
PROT SERPL-MCNC: 7.6 G/DL
PROT UR QL STRIP: ABNORMAL
RBC # BLD AUTO: 4.88 M/UL
RBC #/AREA URNS HPF: 0 /HPF (ref 0–4)
SODIUM SERPL-SCNC: 138 MMOL/L
SP GR UR STRIP: 1.02 (ref 1–1.03)
SQUAMOUS #/AREA URNS HPF: 4 /HPF
URN SPEC COLLECT METH UR: ABNORMAL
UROBILINOGEN UR STRIP-ACNC: 1 EU/DL
WBC # BLD AUTO: 8.62 K/UL
WBC #/AREA URNS HPF: 0 /HPF (ref 0–5)
WBC CLUMPS URNS QL MICRO: NORMAL
YEAST URNS QL MICRO: NORMAL

## 2018-02-04 PROCEDURE — 25500020 PHARM REV CODE 255: Performed by: EMERGENCY MEDICINE

## 2018-02-04 PROCEDURE — 85025 COMPLETE CBC W/AUTO DIFF WBC: CPT

## 2018-02-04 PROCEDURE — 96374 THER/PROPH/DIAG INJ IV PUSH: CPT | Mod: 59

## 2018-02-04 PROCEDURE — 81000 URINALYSIS NONAUTO W/SCOPE: CPT

## 2018-02-04 PROCEDURE — 96372 THER/PROPH/DIAG INJ SC/IM: CPT | Mod: 59

## 2018-02-04 PROCEDURE — 99285 EMERGENCY DEPT VISIT HI MDM: CPT | Mod: 25

## 2018-02-04 PROCEDURE — 63600175 PHARM REV CODE 636 W HCPCS: Performed by: EMERGENCY MEDICINE

## 2018-02-04 PROCEDURE — 25000003 PHARM REV CODE 250: Performed by: EMERGENCY MEDICINE

## 2018-02-04 PROCEDURE — 80053 COMPREHEN METABOLIC PANEL: CPT

## 2018-02-04 RX ORDER — KETOROLAC TROMETHAMINE 30 MG/ML
30 INJECTION, SOLUTION INTRAMUSCULAR; INTRAVENOUS
Status: COMPLETED | OUTPATIENT
Start: 2018-02-04 | End: 2018-02-04

## 2018-02-04 RX ORDER — TRAMADOL HYDROCHLORIDE 50 MG/1
50 TABLET ORAL EVERY 6 HOURS PRN
Qty: 15 TABLET | Refills: 0 | Status: SHIPPED | OUTPATIENT
Start: 2018-02-04 | End: 2018-02-14

## 2018-02-04 RX ORDER — OXYCODONE AND ACETAMINOPHEN 5; 325 MG/1; MG/1
2 TABLET ORAL
Status: COMPLETED | OUTPATIENT
Start: 2018-02-04 | End: 2018-02-04

## 2018-02-04 RX ORDER — AZITHROMYCIN 250 MG/1
250 TABLET, FILM COATED ORAL DAILY
Qty: 4 TABLET | Refills: 0 | Status: SHIPPED | OUTPATIENT
Start: 2018-02-04 | End: 2018-02-08

## 2018-02-04 RX ORDER — CEFTRIAXONE 1 G/1
1 INJECTION, POWDER, FOR SOLUTION INTRAMUSCULAR; INTRAVENOUS
Status: COMPLETED | OUTPATIENT
Start: 2018-02-04 | End: 2018-02-04

## 2018-02-04 RX ORDER — IBUPROFEN 800 MG/1
800 TABLET ORAL EVERY 6 HOURS PRN
Qty: 30 TABLET | Refills: 0 | Status: ON HOLD | OUTPATIENT
Start: 2018-02-04 | End: 2019-11-30

## 2018-02-04 RX ORDER — AZITHROMYCIN 250 MG/1
500 TABLET, FILM COATED ORAL
Status: COMPLETED | OUTPATIENT
Start: 2018-02-04 | End: 2018-02-04

## 2018-02-04 RX ADMIN — IOHEXOL 75 ML: 350 INJECTION, SOLUTION INTRAVENOUS at 09:02

## 2018-02-04 RX ADMIN — KETOROLAC TROMETHAMINE 30 MG: 30 INJECTION, SOLUTION INTRAMUSCULAR at 06:02

## 2018-02-04 RX ADMIN — CEFTRIAXONE SODIUM 1 G: 1 INJECTION, POWDER, FOR SOLUTION INTRAMUSCULAR; INTRAVENOUS at 10:02

## 2018-02-04 RX ADMIN — OXYCODONE HYDROCHLORIDE AND ACETAMINOPHEN 2 TABLET: 5; 325 TABLET ORAL at 06:02

## 2018-02-04 RX ADMIN — AZITHROMYCIN 500 MG: 250 TABLET, FILM COATED ORAL at 10:02

## 2018-02-05 NOTE — ED NOTES
"Pt sitting in bed, AAOx3, calm, cooperative. States "I don't have anymore pain at the moment." IV placed and blood drawn, patient tolerated well. Urinal at bedside, pt aware of the need for urine.  "

## 2018-02-05 NOTE — ED NOTES
Assumed care of patient. Pt sitting up in bed watching TV. NAD noted. Continuous pulse ox in place, BP cycling q 30 mins. Urinal at bedside. Call light within reach.

## 2018-02-05 NOTE — ED PROVIDER NOTES
"Encounter Date: 2/4/2018    SCRIBE #1 NOTE: I, Xaviertejal Lucie , am scribing for, and in the presence of, Dr. Aldana .       History     Chief Complaint   Patient presents with    Flank Pain     at work picking dishes up and "pulled something"; acute pain to left side, denies radiation. Pt denies trauma or fall. PMH of COPD and asthma, denies worsening SOB or difficulty breathing after incident     Time seen by provider: 6:20 PM    This is a 57 y.o. male, with a hx of COPD and HTN, who presents with complaint of left sided flank pain that began  PTA. Pt reports that he was at work during the onset. He states that he "picked up heavy dishes that were supposed to be transported by a cart and that he instead carried them to his dish area." Pt reports that he has not experiencede these symptoms before. Denies abdominal pain, back pain, chest pain, urinary incontinence, dysuria, or hematuria. Pt reports occasional alcohol and nicotine use, but denies any use of illicit drugs. He notes that he uses approximately 1/3 ppd. Pt reports no other exacerbating or alleviating factors.         The history is provided by the patient.     Review of patient's allergies indicates:  No Known Allergies  Past Medical History:   Diagnosis Date    Asthma     COPD (chronic obstructive pulmonary disease)     Hypertension     Seizures      History reviewed. No pertinent surgical history.  Family History   Problem Relation Age of Onset    Cancer Father     Hypertension Sister     Stroke Sister     Stroke Brother     Diabetes Neg Hx     Heart disease Neg Hx      Social History   Substance Use Topics    Smoking status: Current Every Day Smoker     Packs/day: 0.25     Types: Cigarettes    Smokeless tobacco: Never Used    Alcohol use No     Review of Systems   Constitutional: Negative for chills and fever.   HENT: Negative for congestion, rhinorrhea and sore throat.    Respiratory: Negative for cough and shortness of breath.  "   Cardiovascular: Negative for chest pain.   Gastrointestinal: Negative for abdominal pain, diarrhea, nausea and vomiting.   Endocrine: Negative for polyuria.   Genitourinary: Positive for flank pain (Left.). Negative for decreased urine volume, difficulty urinating, dysuria and hematuria.   Musculoskeletal: Negative for back pain.   Skin: Negative for rash.   Allergic/Immunologic: Negative for immunocompromised state.   Neurological: Negative for dizziness and weakness.   Hematological: Does not bruise/bleed easily.   Psychiatric/Behavioral: Negative for confusion.       Physical Exam     Initial Vitals [02/04/18 1807]   BP Pulse Resp Temp SpO2   (!) 186/88 91 18 98 °F (36.7 °C) (!) 88 %      MAP       120.67         Physical Exam    Nursing note and vitals reviewed.  Constitutional: He appears well-developed and well-nourished. No distress.   Uncomfortable appearing especially with movement of torso.    HENT:   Head: Normocephalic and atraumatic.   Right Ear: External ear normal.   Left Ear: External ear normal.   Eyes: Conjunctivae and EOM are normal. Pupils are equal, round, and reactive to light.   Neck: Normal range of motion. Neck supple. No JVD present.   Cardiovascular: Normal rate, regular rhythm and normal heart sounds. Exam reveals no gallop and no friction rub.    No murmur heard.  Pulmonary/Chest: Effort normal and breath sounds normal. No respiratory distress. He has no wheezes. He has no rhonchi. He has no rales. He exhibits no tenderness.   Abdominal: Soft. Normal appearance and bowel sounds are normal. He exhibits no distension and no mass. There is no tenderness. There is no rebound and no guarding.   Musculoskeletal: Normal range of motion. He exhibits tenderness.   Point tenderness to the left lateral chest wall in the approximate eighth rib, mid axillary line. No subque emphysema or bony crepitus.    Lymphadenopathy:     He has no cervical adenopathy.   Neurological: He is alert and oriented to  person, place, and time. He has normal strength. No cranial nerve deficit or sensory deficit.   Skin: Skin is warm and dry.   Psychiatric: He has a normal mood and affect. His behavior is normal. Thought content normal.         ED Course   Procedures  Labs Reviewed   URINALYSIS - Abnormal; Notable for the following:        Result Value    Protein, UA 1+ (*)     All other components within normal limits   CBC W/ AUTO DIFFERENTIAL - Abnormal; Notable for the following:     Hemoglobin 13.5 (*)     RDW 16.0 (*)     Lymph% 14.6 (*)     All other components within normal limits   COMPREHENSIVE METABOLIC PANEL - Abnormal; Notable for the following:     Albumin 3.3 (*)     AST 55 (*)     ALT 66 (*)     All other components within normal limits   URINALYSIS MICROSCOPIC          X-Rays:   Independently Interpreted Readings:   Chest X-Ray: Hyperinflation. Roughly circular opacity to the left mid long field with indistinct border, not present on prior.      Medical Decision Making:   Clinical Tests:   Lab Tests: Ordered and Reviewed  Radiological Study: Ordered and Reviewed    Imaging Results          CT Chest With Contrast (Final result)  Result time 02/04/18 21:35:53    Final result by José Antonio Snell MD (02/04/18 21:35:53)                 Impression:       Airspace opacities in the left upper and left lower lobes.  Followup to resolution is recommended.    Tree in bud opacities in the right upper lobe.  The findings may represent endobronchial spread of infection.  Followup to resolution is also suggested.    Diffuse centrilobular emphysema.    New trace left-sided pleural effusion.    Additional findings as above.          Electronically signed by: JOSÉ ANTONIO SNELL MD  Date:     02/04/18  Time:    21:35              Narrative:    Exam: [id number]  02/04/18  20:57:41 [id number] ([facility]) : CT CHEST WITH CONTRAST    Technique:    CT scan of the chest was performed from the lower thoracic Inlet to the hemidiaphragms after the intravenous  administration of 75 cc of Omni 350 IV.  Coronal and Sagittal Reformats were also obtained.     Comparison:    Chest x-ray dated 02/04/2018 and CT scan of the chest dated 05/14/2017.    Findings:      The thyroid gland is unremarkable.  The lung bases are within normal limits.    The trachea is unremarkable.  The central airways are patent.  No endobronchial lesion is identified.    The heart is unremarkable.  No pericardial effusions are present.  There are scattered coronary artery calcifications.    The ascending thoracic aorta measures 2.8 cm.  The descending thoracic aorta measures 2.7 cm.  There is no intimal flap to suggest a dissection.  There are calcifications along the course of the thoracic aorta.  The great vessels arising from the thoracic aorta are within normal limits.  No filling defects identified in the visualized pulmonary trunk.    There is no evidence of lymphadenopathy in the chest.  The axillary regions are within normal limits.    There is new trace left-sided pleural effusion.  There is no evidence of a pneumothorax.  There is no evidence of pneumomediastinum.      There is diffuse centrilobular emphysema.  There are tree in bud opacities in the right upper lobe There is also nodular airspace opacity in the right upper lobe.  There is nonspecific thickening along the right horizontal fissure.  There is a nodular air space opacity the left upper lobe.  Focal area of airspace opacities within the in the superior segment of the left lower lobe.  There are areas of subsegmental atelectasis in the left lung base.  There is a stable 1.2 cm pleural-based nodule in the left level.  No evidence of component of rounded atelectasis.    The esophagus is within normal limits.  There is a punctate calcification within the left hepatic lobe.  The reminder of the visualized upper abdominal structures are unremarkable.  There is no evidence of free air in the upper abdomen.    The osseous structures are  within normal limits.  The chest wall is unremarkable.                             X-Ray Chest PA And Lateral (Final result)  Result time 02/04/18 18:52:01    Final result by José Antonio Snell MD (02/04/18 18:52:01)                 Impression:       Airspace opacities in left upper lobe.  Followup to resolution is suggested.              Electronically signed by: JOSÉ ANTONIO SNELL MD  Date:     02/04/18  Time:    18:52              Narrative:    Exam: [id number]  02/04/18  18:33:08 [id number] ([facility]) : XR CHEST PA AND LATERAL    Technique:    Frontal and lateral chest x-ray.    Comparison:    11/20/2017.    Findings:      The trachea is unremarkable.  The cardiomediastinal silhouette is within normal limits.  The hemidiaphragms are unremarkable.  There is no evidence of free air beneath the hemidiaphragms.  There are no pleural effusions.  There is no evidence of a pneumothorax.  There is no evidence of pneumomediastinum.  There is an airspace opacity in the left upper lobe.  The osseous structures are unremarkable.  The subcutaneous tissues are within normal limits.                                      Scribe Attestation:   Scribe #1: I performed the above scribed service and the documentation accurately describes the services I performed. I attest to the accuracy of the note.    Attending Attestation:           Physician Attestation for Scribe:  Physician Attestation Statement for Scribe #1: I, Dr. Aldana , reviewed documentation, as scribed by Ricarda Faulkner  in my presence, and it is both accurate and complete.                 ED Course      Patient presents due to sudden onset of very focal pain on the left lateral chest wall while doing heavy lifting.  Symptoms, exam suggest either focal intercostal strain or nondisplaced rib fracture.  Chest x-ray does not show displaced fracture however there is a unusual focal density new from prior which looks more like a pneumonia than mass however the patient has a significant pack year  history of smoking and does not have symptoms such as fever cough or dyspnea to suggest pneumonia.  Therefore CT scan was done to further evaluate this.  This shows multiple patchy opacities more consistent with infectious process he is afebrile white count was normal.  Given a dose of Rocephin and will be started on course of Zithromax for pneumonia but stressed the need to follow-up with primary care or pulmonary for reevaluation.  Also counseled at length on smoking cessation.    Clinical Impression:     1. Pneumonia due to infectious organism, unspecified laterality, unspecified part of lung    2. Chest pain    3. Pulmonary lesion, left    4. Muscle strain of chest wall, initial encounter    5. Tobacco abuse                                   Dg Aldana II, MD  02/04/18 1154

## 2018-08-05 ENCOUNTER — HOSPITAL ENCOUNTER (EMERGENCY)
Facility: HOSPITAL | Age: 58
Discharge: HOME | End: 2018-08-05
Attending: EMERGENCY MEDICINE
Payer: MEDICAID

## 2018-08-05 VITALS
RESPIRATION RATE: 18 BRPM | TEMPERATURE: 99 F | WEIGHT: 135 LBS | OXYGEN SATURATION: 93 % | BODY MASS INDEX: 21.69 KG/M2 | HEART RATE: 67 BPM | HEIGHT: 66 IN | SYSTOLIC BLOOD PRESSURE: 186 MMHG | DIASTOLIC BLOOD PRESSURE: 86 MMHG

## 2018-08-05 DIAGNOSIS — R06.02 SOB (SHORTNESS OF BREATH): ICD-10-CM

## 2018-08-05 PROCEDURE — 93005 ELECTROCARDIOGRAM TRACING: CPT

## 2018-08-05 PROCEDURE — 25000003 PHARM REV CODE 250: Performed by: PHYSICIAN ASSISTANT

## 2018-08-05 PROCEDURE — 99285 EMERGENCY DEPT VISIT HI MDM: CPT | Mod: 25

## 2018-08-05 PROCEDURE — 94640 AIRWAY INHALATION TREATMENT: CPT

## 2018-08-05 PROCEDURE — 25000242 PHARM REV CODE 250 ALT 637 W/ HCPCS

## 2018-08-05 PROCEDURE — 25000242 PHARM REV CODE 250 ALT 637 W/ HCPCS: Performed by: PHYSICIAN ASSISTANT

## 2018-08-05 PROCEDURE — 93010 ELECTROCARDIOGRAM REPORT: CPT | Mod: ,,, | Performed by: INTERNAL MEDICINE

## 2018-08-05 PROCEDURE — 99283 EMERGENCY DEPT VISIT LOW MDM: CPT | Mod: ,,, | Performed by: PHYSICIAN ASSISTANT

## 2018-08-05 PROCEDURE — 94761 N-INVAS EAR/PLS OXIMETRY MLT: CPT

## 2018-08-05 PROCEDURE — 96372 THER/PROPH/DIAG INJ SC/IM: CPT

## 2018-08-05 PROCEDURE — 63600175 PHARM REV CODE 636 W HCPCS: Performed by: PHYSICIAN ASSISTANT

## 2018-08-05 RX ORDER — METHYLPREDNISOLONE 4 MG/1
TABLET ORAL
Qty: 1 PACKAGE | Refills: 0 | Status: SHIPPED | OUTPATIENT
Start: 2018-08-05 | End: 2018-08-12

## 2018-08-05 RX ORDER — METHYLPREDNISOLONE 4 MG/1
TABLET ORAL
Qty: 1 PACKAGE | Refills: 0 | Status: SHIPPED | OUTPATIENT
Start: 2018-08-05 | End: 2018-08-05

## 2018-08-05 RX ORDER — IPRATROPIUM BROMIDE AND ALBUTEROL SULFATE 2.5; .5 MG/3ML; MG/3ML
3 SOLUTION RESPIRATORY (INHALATION)
Status: COMPLETED | OUTPATIENT
Start: 2018-08-05 | End: 2018-08-05

## 2018-08-05 RX ORDER — IPRATROPIUM BROMIDE AND ALBUTEROL SULFATE 2.5; .5 MG/3ML; MG/3ML
SOLUTION RESPIRATORY (INHALATION)
Status: COMPLETED
Start: 2018-08-05 | End: 2018-08-05

## 2018-08-05 RX ORDER — ACETAMINOPHEN 500 MG
1000 TABLET ORAL
Status: COMPLETED | OUTPATIENT
Start: 2018-08-05 | End: 2018-08-05

## 2018-08-05 RX ORDER — METHYLPREDNISOLONE SOD SUCC 125 MG
125 VIAL (EA) INJECTION
Status: COMPLETED | OUTPATIENT
Start: 2018-08-05 | End: 2018-08-05

## 2018-08-05 RX ORDER — ALBUTEROL SULFATE 90 UG/1
2 AEROSOL, METERED RESPIRATORY (INHALATION) EVERY 6 HOURS PRN
Qty: 1 EACH | Refills: 3 | Status: SHIPPED | OUTPATIENT
Start: 2018-08-05 | End: 2018-08-05

## 2018-08-05 RX ORDER — ALBUTEROL SULFATE 90 UG/1
2 AEROSOL, METERED RESPIRATORY (INHALATION) EVERY 6 HOURS PRN
Qty: 1 EACH | Refills: 3 | Status: ON HOLD | OUTPATIENT
Start: 2018-08-05 | End: 2018-10-04 | Stop reason: SDUPTHER

## 2018-08-05 RX ADMIN — IPRATROPIUM BROMIDE AND ALBUTEROL SULFATE 3 ML: .5; 3 SOLUTION RESPIRATORY (INHALATION) at 11:08

## 2018-08-05 RX ADMIN — METHYLPREDNISOLONE SODIUM SUCCINATE 125 MG: 125 INJECTION, POWDER, FOR SOLUTION INTRAMUSCULAR; INTRAVENOUS at 10:08

## 2018-08-05 RX ADMIN — ACETAMINOPHEN 1000 MG: 500 TABLET ORAL at 11:08

## 2018-08-05 RX ADMIN — IPRATROPIUM BROMIDE AND ALBUTEROL SULFATE 3 ML: .5; 3 SOLUTION RESPIRATORY (INHALATION) at 12:08

## 2018-08-05 NOTE — ED NOTES
"Pt in RWR less than 2 minutes and begins loudly stating "I'm in pain, y'all going to give me something for pain.  I'm in pain"  After the third time, I addressed the pt  concerning his disruptive behavior.  Pt moved to consult room to minimize disturbing others and to have the provider speak to him about the pain he did not report to him on exam. Pt reports that his pain is in his "arms " where he insisted on receiving the injection (which is documented).    "

## 2018-08-05 NOTE — DISCHARGE INSTRUCTIONS
Please follow-up with your primary care provider for management of your breathing trouble. You may use the provided resources to establish an appointment.    Our goal in the emergency department is to always give you outstanding care and exceptional service. You may receive a survey by mail or e-mail in the next week regarding your experience in our ED. We would greatly appreciate your completing and returning the survey. Your feedback provides us with a way to recognize our staff who give very good care and it helps us learn how to improve when your experience was below our aspiration of excellence.

## 2018-08-05 NOTE — ED PROVIDER NOTES
"Encounter Date: 8/5/2018       History     Chief Complaint   Patient presents with    Shortness of Breath     onset SOB  while working inside today. Hx COPD / asthma. One duo neb per EMS = 100% on 8L O2. 91%  RA.     58-year-old male with a history of COPD, hypertension, tobacco abuse, seizure disorder presenting to the ED with a chief complaint of shortness of breath. Patient reports developing shortness of breath while at work this morning. He is a  and states he "over did" himself. He uses an albuterol inhaler PRN and did not have relief. He states having similar episodes daily and normally has relief from his Albuterol inhaler. Patient received duo-nebs while in EMS and subsequently had relief. Patient at the time of my exam states his SOB has resolved and he is ready to go back to work. He has a chronic dry cough. He denies fever, chest pain, abdominal pain, nausea, vomiting, dysuria, hematuria, diarrhea, constipation. No history of blood clots. He uses NC O2 at night.           Review of patient's allergies indicates:   Allergen Reactions    Benazepril Swelling     Past Medical History:   Diagnosis Date    Asthma     COPD (chronic obstructive pulmonary disease)     Hypertension     Seizures      History reviewed. No pertinent surgical history.  Family History   Problem Relation Age of Onset    Cancer Father     Hypertension Sister     Stroke Sister     Stroke Brother     Diabetes Neg Hx     Heart disease Neg Hx      Social History     Tobacco Use    Smoking status: Current Every Day Smoker     Packs/day: 0.25     Types: Cigarettes    Smokeless tobacco: Never Used   Substance Use Topics    Alcohol use: No    Drug use: No     Review of Systems   Constitutional: Negative for chills and fever.   HENT: Negative for congestion, sore throat and trouble swallowing.    Eyes: Negative for pain, redness and visual disturbance.   Respiratory: Positive for cough and shortness of breath.  "   Cardiovascular: Negative for chest pain.   Gastrointestinal: Negative for abdominal pain, constipation, diarrhea, nausea and vomiting.   Genitourinary: Negative for dysuria.   Musculoskeletal: Negative for back pain, neck pain and neck stiffness.   Skin: Negative for rash.   Neurological: Negative for seizures, syncope, weakness, light-headedness and headaches.   Hematological: Does not bruise/bleed easily.       Physical Exam     Initial Vitals [08/05/18 0943]   BP Pulse Resp Temp SpO2   (!) 169/94 75 20 97.7 °F (36.5 °C) 100 %      MAP       --         Physical Exam    Constitutional: He appears well-developed and well-nourished. He is not diaphoretic. No distress.   HENT:   Head: Normocephalic and atraumatic.   Mouth/Throat: Oropharynx is clear and moist. No oropharyngeal exudate.   Eyes: EOM are normal. Pupils are equal, round, and reactive to light.   Neck: Normal range of motion. Neck supple.   Cardiovascular: Normal rate and regular rhythm.   No edema to BLE   Pulmonary/Chest: Breath sounds normal. No respiratory distress. He has no wheezes.   Speaking fluently in full sentences without difficulty   Abdominal: Soft. Bowel sounds are normal. He exhibits no distension. There is no tenderness.   Musculoskeletal: Normal range of motion. He exhibits no edema or tenderness.   Neurological: He is alert and oriented to person, place, and time. He has normal strength. No cranial nerve deficit or sensory deficit.   Skin: Skin is warm and dry. No erythema.       ED Course   Procedures  Labs Reviewed - No data to display       Imaging Results          X-Ray Chest PA And Lateral (Final result)  Result time 08/05/18 11:40:10    Final result by Napoleon Murphy III, MD (08/05/18 11:40:10)                 Impression:      See above    No acute process seen.      Electronically signed by: Napoleon Murphy MD  Date:    08/05/2018  Time:    11:40             Narrative:    EXAMINATION:  XR CHEST PA AND LATERAL    CLINICAL  HISTORY:  Shortness of breath    FINDINGS:  Heart is normal lungs are clear and the bones and bowel gas are noncontributory.                                       APC / Resident Notes:   58-year-old male with a history of COPD, hypertension, tobacco abuse, seizure disorder presenting to the ED c/o episode of SOB today that was unrelieved from his PRN albuterol inhaler. Patient received duo-neb x1 in EMS and reports relief. DDx includes but not limited to COPD, asthma, pneumonia, CHF. Will get CXR, ECG, and give solumedrol.     11:47 AM  CXR without acute intrathoracic processes. Repeat vitals reveal POx 92%. Will give duo-nebs x3.    Patient reports feeling much better on reassessment and ready for discharge. No signs of respiratory distress present. Patient stable for outpatient follow-up with PCP. Patient given resources to establish an appointment.  Will give prescription for Medrol Dosepak and refill his albuterol inhaler. Work excuse given. Return to ED precautions given for new, worsening, or concerning symptoms. I have discussed the care of this patient with my supervising physician.              Attending Attestation:     Physician Attestation Statement for NP/PA:   I discussed this assessment and plan of this patient with the NP/PA, but I did not personally examine the patient. The face to face encounter was performed by the NP/PA.                     Clinical Impression:   Diagnoses of SOB (shortness of breath) and SOB (shortness of breath) were pertinent to this visit.      Disposition:   Disposition: Discharged  Condition: Stable                        Mckinley Cotto PA-C  08/05/18 1328       Wellington Ndiaye MD  08/12/18 1214

## 2018-08-05 NOTE — ED NOTES
"Pt identifiers checked and accurate with Baltazargordo Mccray    Pt reports to ED with complaints of "asthma attack at work, working too hard and too fast", "I need a refill on my inhaler too". Pt denies SOB at this time, trouble breathing, CP and weakness.     LOC: The patient is awake, alert and aware of environment with an appropriate affect, the patient is oriented x 3 and speaking appropriately.  APPEARANCE: Patient resting comfortably and in no acute distress, patient is clean and well groomed  SKIN: The skin is warm and dry, color consistent with ethnicity, skin intact, no breakdown or bruising noted.  MUSCULOSKELETAL: Patient moving all extremities well, no obvious swelling or deformities noted. Pt ambulates unassisted with steady gait   RESPIRATORY: Airway is open and patent, breath sounds clear throughout all lung fields; respirations are spontaneous, patient has a normal effort and rate, no accessory muscle use noted.   CARDIAC: Patient has no peripheral edema noted. No complaints of chest pain   NEUROLOGIC: PERRL, 3 mm bilaterally, eyes open spontaneously, behavior appropriate to situation, follows commands    "

## 2018-08-05 NOTE — PROVIDER PROGRESS NOTES - EMERGENCY DEPT.
Encounter Date: 8/5/2018    ED Physician Progress Notes         EKG - STEMI Decision  Initial Reading: No STEMI present.    SCRIBE NOTE: I, Curt Lewis, am scribing for, and in the presence of, Wellington Ndiaye MD. I performed the above scribed service and the documentation accurately describes the services I performed. I attest to the accuracy of the note.

## 2018-08-08 ENCOUNTER — HOSPITAL ENCOUNTER (EMERGENCY)
Facility: HOSPITAL | Age: 58
Discharge: HOME | End: 2018-08-09
Attending: EMERGENCY MEDICINE
Payer: MEDICAID

## 2018-08-08 DIAGNOSIS — R06.02 SOB (SHORTNESS OF BREATH): Primary | ICD-10-CM

## 2018-08-08 LAB
ALBUMIN SERPL BCP-MCNC: 3.2 G/DL
ALP SERPL-CCNC: 106 U/L
ALT SERPL W/O P-5'-P-CCNC: 59 U/L
ANION GAP SERPL CALC-SCNC: 9 MMOL/L
AST SERPL-CCNC: 88 U/L
BASOPHILS # BLD AUTO: 0.06 K/UL
BASOPHILS NFR BLD: 0.9 %
BILIRUB SERPL-MCNC: 0.5 MG/DL
BNP SERPL-MCNC: 43 PG/ML
BUN SERPL-MCNC: 9 MG/DL
CALCIUM SERPL-MCNC: 8.5 MG/DL
CHLORIDE SERPL-SCNC: 104 MMOL/L
CO2 SERPL-SCNC: 26 MMOL/L
CREAT SERPL-MCNC: 0.8 MG/DL
DIFFERENTIAL METHOD: ABNORMAL
EOSINOPHIL # BLD AUTO: 0.2 K/UL
EOSINOPHIL NFR BLD: 2.5 %
ERYTHROCYTE [DISTWIDTH] IN BLOOD BY AUTOMATED COUNT: 16.1 %
EST. GFR  (AFRICAN AMERICAN): >60 ML/MIN/1.73 M^2
EST. GFR  (NON AFRICAN AMERICAN): >60 ML/MIN/1.73 M^2
GLUCOSE SERPL-MCNC: 81 MG/DL
HCT VFR BLD AUTO: 44.7 %
HGB BLD-MCNC: 14.5 G/DL
IMM GRANULOCYTES # BLD AUTO: 0.02 K/UL
IMM GRANULOCYTES NFR BLD AUTO: 0.3 %
LYMPHOCYTES # BLD AUTO: 3.3 K/UL
LYMPHOCYTES NFR BLD: 48.7 %
MCH RBC QN AUTO: 28.4 PG
MCHC RBC AUTO-ENTMCNC: 32.4 G/DL
MCV RBC AUTO: 88 FL
MONOCYTES # BLD AUTO: 0.6 K/UL
MONOCYTES NFR BLD: 9.4 %
NEUTROPHILS # BLD AUTO: 2.6 K/UL
NEUTROPHILS NFR BLD: 38.2 %
NRBC BLD-RTO: 0 /100 WBC
PLATELET # BLD AUTO: 181 K/UL
PMV BLD AUTO: 10.7 FL
POTASSIUM SERPL-SCNC: 4 MMOL/L
PROT SERPL-MCNC: 7.3 G/DL
RBC # BLD AUTO: 5.11 M/UL
SODIUM SERPL-SCNC: 139 MMOL/L
WBC # BLD AUTO: 6.72 K/UL

## 2018-08-08 PROCEDURE — 99284 EMERGENCY DEPT VISIT MOD MDM: CPT | Mod: ,,, | Performed by: EMERGENCY MEDICINE

## 2018-08-08 PROCEDURE — 27000221 HC OXYGEN, UP TO 24 HOURS

## 2018-08-08 PROCEDURE — 25000242 PHARM REV CODE 250 ALT 637 W/ HCPCS: Performed by: STUDENT IN AN ORGANIZED HEALTH CARE EDUCATION/TRAINING PROGRAM

## 2018-08-08 PROCEDURE — 63600175 PHARM REV CODE 636 W HCPCS: Performed by: STUDENT IN AN ORGANIZED HEALTH CARE EDUCATION/TRAINING PROGRAM

## 2018-08-08 PROCEDURE — 80053 COMPREHEN METABOLIC PANEL: CPT

## 2018-08-08 PROCEDURE — 94640 AIRWAY INHALATION TREATMENT: CPT

## 2018-08-08 PROCEDURE — 94761 N-INVAS EAR/PLS OXIMETRY MLT: CPT

## 2018-08-08 PROCEDURE — 99285 EMERGENCY DEPT VISIT HI MDM: CPT | Mod: 25

## 2018-08-08 PROCEDURE — 83880 ASSAY OF NATRIURETIC PEPTIDE: CPT

## 2018-08-08 PROCEDURE — 85025 COMPLETE CBC W/AUTO DIFF WBC: CPT

## 2018-08-08 RX ORDER — IPRATROPIUM BROMIDE AND ALBUTEROL SULFATE 2.5; .5 MG/3ML; MG/3ML
3 SOLUTION RESPIRATORY (INHALATION)
Status: COMPLETED | OUTPATIENT
Start: 2018-08-08 | End: 2018-08-08

## 2018-08-08 RX ORDER — PREDNISONE 20 MG/1
60 TABLET ORAL
Status: COMPLETED | OUTPATIENT
Start: 2018-08-08 | End: 2018-08-08

## 2018-08-08 RX ORDER — PREDNISONE 20 MG/1
40 TABLET ORAL DAILY
Qty: 10 TABLET | Refills: 0 | Status: SHIPPED | OUTPATIENT
Start: 2018-08-08 | End: 2018-08-12

## 2018-08-08 RX ORDER — FLUTICASONE FUROATE AND VILANTEROL 100; 25 UG/1; UG/1
1 POWDER RESPIRATORY (INHALATION) DAILY
Qty: 14 EACH | Refills: 1 | Status: ON HOLD | OUTPATIENT
Start: 2018-08-08 | End: 2019-11-30

## 2018-08-08 RX ADMIN — PREDNISONE 60 MG: 20 TABLET ORAL at 08:08

## 2018-08-08 RX ADMIN — IPRATROPIUM BROMIDE AND ALBUTEROL SULFATE 3 ML: .5; 3 SOLUTION RESPIRATORY (INHALATION) at 08:08

## 2018-08-08 RX ADMIN — IPRATROPIUM BROMIDE AND ALBUTEROL SULFATE 3 ML: .5; 3 SOLUTION RESPIRATORY (INHALATION) at 10:08

## 2018-08-09 VITALS
DIASTOLIC BLOOD PRESSURE: 72 MMHG | HEART RATE: 75 BPM | TEMPERATURE: 99 F | RESPIRATION RATE: 17 BRPM | OXYGEN SATURATION: 95 % | SYSTOLIC BLOOD PRESSURE: 143 MMHG

## 2018-08-09 NOTE — ED TRIAGE NOTES
"Baltazar Mccray, a 58 y.o. male presents to the ED bed 8      Chief Complaint   Patient presents with    Shortness of Breath     x2 days. Recently seen in ED for same. Sating 84% RA, 91% 2L NC, 98% neb. Denies cp. afebrile.        Pt states hx of asthma and copd. He was here on Monday for exacerbation. Denies chest pain at that time; given breathing tx and sent home.  Pt states he is on continuous 2 liters nasal canula at home; still smoking "2 cigarettes per day".  He started having "asthma attack tonight" and tried inhalers at home with no relief.   Ems found him at home SOB with no oxygen on. Sats 84%.       Review of patient's allergies indicates:   Allergen Reactions    Benazepril Swelling     Past Medical History:   Diagnosis Date    Asthma     COPD (chronic obstructive pulmonary disease)     Hypertension     Seizures      "

## 2018-08-09 NOTE — ED NOTES
Pt resting comfortably and independently repositioned in stretcher with bed locked in lowest position for safety. Pt instructed to call if assistance is needed. Pt on continuous cardiac, BP, and O2 monitoring. Call light within reach.  No needs at this time. Will continue to monitor. Awaiting respiratory to give breathing treatment. Pt is breathing better and no longer tachypnea. Pt requested to lay back in bed.

## 2018-08-09 NOTE — ED PROVIDER NOTES
Encounter Date: 8/8/2018    SCRIBE #1 NOTE: IStacia, am scribing for, and in the presence of, Dr. Hernandez. the Resident attestation.       History     Chief Complaint   Patient presents with    Shortness of Breath     x2 days. Recently seen in ED for same. Sating 84% RA, 91% 2L NC, 98% neb. Denies cp. afebrile.      Mr Mccray is a 57 yo M presenting for SOB with a history of COPD, hypertension, tobacco abuse, seizure disorder. Patient was at home, lying in bed on his home O2 (2L) and began to feel SOB. He used his albuterol inhaler without relief and is on the second last day of his medrol dose pack he was discharged with. He recently presented to [facility] [place] for similar presentation on 8/5/2018.  He has a chronic dry cough. He denies fever, chest pain, abdominal pain, nausea, vomiting, dysuria, hematuria, diarrhea, constipation. He uses 2L NC at night.           Review of patient's allergies indicates:   Allergen Reactions    Benazepril Swelling     Past Medical History:   Diagnosis Date    Asthma     COPD (chronic obstructive pulmonary disease)     Hypertension     Seizures      History reviewed. No pertinent surgical history.  Family History   Problem Relation Age of Onset    Cancer Father     Hypertension Sister     Stroke Sister     Stroke Brother     Diabetes Neg Hx     Heart disease Neg Hx      Social History   Substance Use Topics    Smoking status: Current Every Day Smoker     Packs/day: 0.25     Types: Cigarettes    Smokeless tobacco: Never Used    Alcohol use No     Review of Systems   Constitutional: Negative for chills, diaphoresis, fatigue and fever.   Eyes: Negative.    Respiratory: Positive for cough and shortness of breath.    Cardiovascular: Negative for chest pain and palpitations.   Gastrointestinal: Negative.    Endocrine: Negative.    Genitourinary: Negative.    Musculoskeletal: Negative.    Skin: Negative.    Neurological: Negative.    Hematological: Negative.   "  Psychiatric/Behavioral: Negative.        Physical Exam     Initial Vitals [08/08/18 2007]   BP Pulse Resp Temp SpO2   (!) 154/94 71 20 98.7 °F (37.1 °C) 98 %      MAP       --         Physical Exam    Constitutional: He is not diaphoretic. No distress.   On 3L NC, able to speak in full sentences without becoming SOB   Eyes: Pupils are equal, round, and reactive to light. Right conjunctiva is injected. Left conjunctiva is injected.   Cardiovascular: Normal rate, regular rhythm and normal heart sounds.   Pulmonary/Chest: He has decreased breath sounds. He has rhonchi. He has no rales.   Abdominal: Soft. There is no tenderness.   Musculoskeletal: Normal range of motion.   Neurological: He is alert and oriented to person, place, and time.   Skin: Capillary refill takes less than 2 seconds.   Psychiatric: He has a normal mood and affect. His behavior is normal. Judgment and thought content normal.         ED Course   Procedures  Labs Reviewed   CBC W/ AUTO DIFFERENTIAL - Abnormal; Notable for the following:        Result Value    RDW 16.1 (*)     Lymph% 48.7 (*)     All other components within normal limits   COMPREHENSIVE METABOLIC PANEL - Abnormal; Notable for the following:     Calcium 8.5 (*)     Albumin 3.2 (*)     AST 88 (*)     ALT 59 (*)     All other components within normal limits   B-TYPE NATRIURETIC PEPTIDE     EKG Readings: (Independently Interpreted)   Initial Reading: No STEMI.   Normal sinus rhtyhm       Imaging Results          X-Ray Chest PA And Lateral (Final result)  Result time 08/08/18 21:24:21    Final result by Oliver Barrientos MD (08/08/18 21:24:21)                 Impression:      No detrimental change.      Electronically signed by: Oliver Barrientos MD  Date:    08/08/2018  Time:    21:24             Narrative:    EXAMINATION:  XR CHEST PA AND LATERAL    CLINICAL HISTORY:  Provided history is "Asthma;  ".    TECHNIQUE:  Frontal and lateral views of the chest were " performed.    COMPARISON:  08/05/2018.    FINDINGS:  Cardiac wires overlie the chest on the frontal and lateral views.  Cardiac silhouette is not enlarged.  No large focal consolidation.  No sizable pleural effusion.  No pneumothorax.  No detrimental change in lung aeration.                              X-Rays:   Independently Interpreted Readings:   Chest X-Ray: No large focal consolidation.  No sizable pleural effusion.  No pneumothorax.  No detrimental change in lung aeration.     Medical Decision Making:   History:   Old Medical Records: I decided to obtain old medical records.  Initial Assessment:   57 yo M presents for SOB on background of COPD with a history of COPD. He was in bed on his nightly home O2 and became SOB and it did not resolve with his albuterol inhaler. [place] [facility] [place] for SOB and was discharged with a refill on his albuterol rescue inhaler and a medrol dose pack (final dose tomorrow)  Differential Diagnosis:   COPD exacerbation is the principle differential. Pneumonia is also a possibility, but unlikely.   Clinical Tests:   Lab Tests: Ordered  Radiological Study: Ordered  Medical Tests: Ordered  ED Management:  Put on 3L NC, Given Duonebs treatment x3, and 60mg prednisone            Scribe Attestation:   Scribe #1: I performed the above scribed service and the documentation accurately describes the services I performed. I attest to the accuracy of the note.    Attending Attestation:   Physician Attestation Statement for Resident:  As the supervising MD   Physician Attestation Statement: I have personally seen and examined this patient.   I agree with the above history. -: SOB   As the supervising MD I agree with the above PE.    As the supervising MD I agree with the above treatment, course, plan, and disposition.            I, Dr. Ko Hernandez III, personally performed the services described in this documentation. All medical record entries made by the scribe were  at my direction and in my presence.  I have reviewed the chart and agree that the record reflects my personal performance and is accurate and complete. Ko Hernandez III, MD.  11:37 PM 08/08/2018             Clinical Impression:   The encounter diagnosis was SOB (shortness of breath).                             Maurice Rincon MD  Resident  08/08/18 2405       Ko Hernandez III, MD  08/08/18 1511

## 2018-08-12 ENCOUNTER — HOSPITAL ENCOUNTER (OUTPATIENT)
Facility: HOSPITAL | Age: 58
Discharge: HOME | End: 2018-08-14
Attending: EMERGENCY MEDICINE | Admitting: HOSPITALIST
Payer: MEDICAID

## 2018-08-12 DIAGNOSIS — J44.1 COPD EXACERBATION: Primary | ICD-10-CM

## 2018-08-12 DIAGNOSIS — R79.89 ELEVATED TROPONIN: ICD-10-CM

## 2018-08-12 DIAGNOSIS — R06.02 SHORTNESS OF BREATH: ICD-10-CM

## 2018-08-12 PROBLEM — Z72.0 TOBACCO ABUSE: Status: ACTIVE | Noted: 2018-08-12

## 2018-08-12 PROBLEM — J96.12 CHRONIC RESPIRATORY FAILURE WITH HYPOXIA AND HYPERCAPNIA: Status: ACTIVE | Noted: 2018-08-12

## 2018-08-12 PROBLEM — J96.11 CHRONIC RESPIRATORY FAILURE WITH HYPOXIA AND HYPERCAPNIA: Status: ACTIVE | Noted: 2018-08-12

## 2018-08-12 LAB
ALBUMIN SERPL BCP-MCNC: 3.5 G/DL
ALP SERPL-CCNC: 98 U/L
ALT SERPL W/O P-5'-P-CCNC: 48 U/L
ANION GAP SERPL CALC-SCNC: 13 MMOL/L
AST SERPL-CCNC: 65 U/L
BASOPHILS # BLD AUTO: 0.05 K/UL
BASOPHILS NFR BLD: 0.9 %
BILIRUB SERPL-MCNC: 0.5 MG/DL
BUN SERPL-MCNC: 11 MG/DL
CALCIUM SERPL-MCNC: 8.5 MG/DL
CHLORIDE SERPL-SCNC: 101 MMOL/L
CO2 SERPL-SCNC: 27 MMOL/L
CREAT SERPL-MCNC: 0.8 MG/DL
DIFFERENTIAL METHOD: ABNORMAL
EOSINOPHIL # BLD AUTO: 0.2 K/UL
EOSINOPHIL NFR BLD: 3.3 %
ERYTHROCYTE [DISTWIDTH] IN BLOOD BY AUTOMATED COUNT: 15.7 %
EST. GFR  (AFRICAN AMERICAN): >60 ML/MIN/1.73 M^2
EST. GFR  (NON AFRICAN AMERICAN): >60 ML/MIN/1.73 M^2
ESTIMATED AVG GLUCOSE: 100 MG/DL
GLUCOSE SERPL-MCNC: 80 MG/DL
HBA1C MFR BLD HPLC: 5.1 %
HCT VFR BLD AUTO: 41.7 %
HGB BLD-MCNC: 14 G/DL
IMM GRANULOCYTES # BLD AUTO: 0.01 K/UL
IMM GRANULOCYTES NFR BLD AUTO: 0.2 %
LYMPHOCYTES # BLD AUTO: 2.3 K/UL
LYMPHOCYTES NFR BLD: 42 %
MCH RBC QN AUTO: 28.7 PG
MCHC RBC AUTO-ENTMCNC: 33.6 G/DL
MCV RBC AUTO: 86 FL
MONOCYTES # BLD AUTO: 0.6 K/UL
MONOCYTES NFR BLD: 11.5 %
NEUTROPHILS # BLD AUTO: 2.3 K/UL
NEUTROPHILS NFR BLD: 42.1 %
NRBC BLD-RTO: 0 /100 WBC
PLATELET # BLD AUTO: 155 K/UL
PMV BLD AUTO: 10.2 FL
POTASSIUM SERPL-SCNC: 3.7 MMOL/L
PROCALCITONIN SERPL IA-MCNC: 0.12 NG/ML
PROT SERPL-MCNC: 7.6 G/DL
RBC # BLD AUTO: 4.87 M/UL
SODIUM SERPL-SCNC: 141 MMOL/L
TROPONIN I SERPL DL<=0.01 NG/ML-MCNC: 0.02 NG/ML
TROPONIN I SERPL DL<=0.01 NG/ML-MCNC: 0.03 NG/ML
WBC # BLD AUTO: 5.47 K/UL

## 2018-08-12 PROCEDURE — 25000242 PHARM REV CODE 250 ALT 637 W/ HCPCS: Performed by: EMERGENCY MEDICINE

## 2018-08-12 PROCEDURE — G0378 HOSPITAL OBSERVATION PER HR: HCPCS

## 2018-08-12 PROCEDURE — 94761 N-INVAS EAR/PLS OXIMETRY MLT: CPT

## 2018-08-12 PROCEDURE — 85025 COMPLETE CBC W/AUTO DIFF WBC: CPT

## 2018-08-12 PROCEDURE — 83036 HEMOGLOBIN GLYCOSYLATED A1C: CPT

## 2018-08-12 PROCEDURE — 63600175 PHARM REV CODE 636 W HCPCS: Performed by: EMERGENCY MEDICINE

## 2018-08-12 PROCEDURE — 27000221 HC OXYGEN, UP TO 24 HOURS

## 2018-08-12 PROCEDURE — 99285 EMERGENCY DEPT VISIT HI MDM: CPT | Mod: ,,, | Performed by: EMERGENCY MEDICINE

## 2018-08-12 PROCEDURE — 25000242 PHARM REV CODE 250 ALT 637 W/ HCPCS: Performed by: PHYSICIAN ASSISTANT

## 2018-08-12 PROCEDURE — 99285 EMERGENCY DEPT VISIT HI MDM: CPT | Mod: 25

## 2018-08-12 PROCEDURE — 84145 PROCALCITONIN (PCT): CPT

## 2018-08-12 PROCEDURE — 80053 COMPREHEN METABOLIC PANEL: CPT

## 2018-08-12 PROCEDURE — 84484 ASSAY OF TROPONIN QUANT: CPT

## 2018-08-12 PROCEDURE — 25000003 PHARM REV CODE 250: Performed by: EMERGENCY MEDICINE

## 2018-08-12 PROCEDURE — 94640 AIRWAY INHALATION TREATMENT: CPT

## 2018-08-12 PROCEDURE — 99220 PR INITIAL OBSERVATION CARE,LEVL III: CPT | Mod: ,,, | Performed by: PHYSICIAN ASSISTANT

## 2018-08-12 PROCEDURE — 36415 COLL VENOUS BLD VENIPUNCTURE: CPT

## 2018-08-12 PROCEDURE — 96374 THER/PROPH/DIAG INJ IV PUSH: CPT

## 2018-08-12 PROCEDURE — 25000003 PHARM REV CODE 250: Performed by: PHYSICIAN ASSISTANT

## 2018-08-12 RX ORDER — AZITHROMYCIN 250 MG/1
250 TABLET, FILM COATED ORAL DAILY
Status: DISCONTINUED | OUTPATIENT
Start: 2018-08-13 | End: 2018-08-14 | Stop reason: HOSPADM

## 2018-08-12 RX ORDER — ACETAMINOPHEN 325 MG/1
650 TABLET ORAL EVERY 4 HOURS PRN
Status: DISCONTINUED | OUTPATIENT
Start: 2018-08-12 | End: 2018-08-14 | Stop reason: HOSPADM

## 2018-08-12 RX ORDER — GLUCAGON 1 MG
1 KIT INJECTION
Status: DISCONTINUED | OUTPATIENT
Start: 2018-08-12 | End: 2018-08-14 | Stop reason: HOSPADM

## 2018-08-12 RX ORDER — IBUPROFEN 200 MG
24 TABLET ORAL
Status: DISCONTINUED | OUTPATIENT
Start: 2018-08-12 | End: 2018-08-14 | Stop reason: HOSPADM

## 2018-08-12 RX ORDER — IBUPROFEN 200 MG
16 TABLET ORAL
Status: DISCONTINUED | OUTPATIENT
Start: 2018-08-12 | End: 2018-08-14 | Stop reason: HOSPADM

## 2018-08-12 RX ORDER — POLYETHYLENE GLYCOL 3350 17 G/17G
17 POWDER, FOR SOLUTION ORAL DAILY
Status: DISCONTINUED | OUTPATIENT
Start: 2018-08-13 | End: 2018-08-14 | Stop reason: HOSPADM

## 2018-08-12 RX ORDER — AZITHROMYCIN 250 MG/1
500 TABLET, FILM COATED ORAL ONCE
Status: COMPLETED | OUTPATIENT
Start: 2018-08-12 | End: 2018-08-12

## 2018-08-12 RX ORDER — ASPIRIN 325 MG
325 TABLET ORAL
Status: COMPLETED | OUTPATIENT
Start: 2018-08-12 | End: 2018-08-12

## 2018-08-12 RX ORDER — IPRATROPIUM BROMIDE AND ALBUTEROL SULFATE 2.5; .5 MG/3ML; MG/3ML
3 SOLUTION RESPIRATORY (INHALATION) EVERY 4 HOURS
Status: DISCONTINUED | OUTPATIENT
Start: 2018-08-12 | End: 2018-08-14 | Stop reason: HOSPADM

## 2018-08-12 RX ORDER — IPRATROPIUM BROMIDE AND ALBUTEROL SULFATE 2.5; .5 MG/3ML; MG/3ML
3 SOLUTION RESPIRATORY (INHALATION)
Status: COMPLETED | OUTPATIENT
Start: 2018-08-12 | End: 2018-08-12

## 2018-08-12 RX ORDER — ALBUTEROL SULFATE 0.83 MG/ML
2.5 SOLUTION RESPIRATORY (INHALATION) EVERY 4 HOURS
Status: CANCELLED | OUTPATIENT
Start: 2018-08-12

## 2018-08-12 RX ORDER — RAMELTEON 8 MG/1
8 TABLET ORAL NIGHTLY PRN
Status: DISCONTINUED | OUTPATIENT
Start: 2018-08-12 | End: 2018-08-14 | Stop reason: HOSPADM

## 2018-08-12 RX ORDER — KETOROLAC TROMETHAMINE 30 MG/ML
15 INJECTION, SOLUTION INTRAMUSCULAR; INTRAVENOUS EVERY 6 HOURS PRN
Status: DISCONTINUED | OUTPATIENT
Start: 2018-08-12 | End: 2018-08-14 | Stop reason: HOSPADM

## 2018-08-12 RX ORDER — ALBUTEROL SULFATE 0.83 MG/ML
2.5 SOLUTION RESPIRATORY (INHALATION) EVERY 4 HOURS PRN
Status: DISCONTINUED | OUTPATIENT
Start: 2018-08-12 | End: 2018-08-14 | Stop reason: HOSPADM

## 2018-08-12 RX ORDER — ACETAMINOPHEN 325 MG/1
650 TABLET ORAL EVERY 8 HOURS PRN
Status: DISCONTINUED | OUTPATIENT
Start: 2018-08-12 | End: 2018-08-14 | Stop reason: HOSPADM

## 2018-08-12 RX ORDER — BENZONATATE 100 MG/1
200 CAPSULE ORAL 3 TIMES DAILY PRN
Status: DISCONTINUED | OUTPATIENT
Start: 2018-08-12 | End: 2018-08-14 | Stop reason: HOSPADM

## 2018-08-12 RX ORDER — PREDNISONE 20 MG/1
40 TABLET ORAL DAILY
Status: DISCONTINUED | OUTPATIENT
Start: 2018-08-13 | End: 2018-08-14 | Stop reason: HOSPADM

## 2018-08-12 RX ORDER — METHYLPREDNISOLONE SOD SUCC 125 MG
125 VIAL (EA) INJECTION
Status: COMPLETED | OUTPATIENT
Start: 2018-08-12 | End: 2018-08-12

## 2018-08-12 RX ORDER — FLUTICASONE FUROATE AND VILANTEROL 100; 25 UG/1; UG/1
1 POWDER RESPIRATORY (INHALATION) DAILY
Status: DISCONTINUED | OUTPATIENT
Start: 2018-08-13 | End: 2018-08-14 | Stop reason: HOSPADM

## 2018-08-12 RX ORDER — ONDANSETRON 8 MG/1
8 TABLET, ORALLY DISINTEGRATING ORAL EVERY 8 HOURS PRN
Status: DISCONTINUED | OUTPATIENT
Start: 2018-08-12 | End: 2018-08-14 | Stop reason: HOSPADM

## 2018-08-12 RX ORDER — BISACODYL 10 MG
10 SUPPOSITORY, RECTAL RECTAL DAILY PRN
Status: DISCONTINUED | OUTPATIENT
Start: 2018-08-12 | End: 2018-08-14 | Stop reason: HOSPADM

## 2018-08-12 RX ORDER — PANTOPRAZOLE SODIUM 40 MG/1
40 TABLET, DELAYED RELEASE ORAL DAILY
Status: DISCONTINUED | OUTPATIENT
Start: 2018-08-13 | End: 2018-08-14 | Stop reason: HOSPADM

## 2018-08-12 RX ORDER — INSULIN ASPART 100 [IU]/ML
0-5 INJECTION, SOLUTION INTRAVENOUS; SUBCUTANEOUS
Status: DISCONTINUED | OUTPATIENT
Start: 2018-08-12 | End: 2018-08-14 | Stop reason: HOSPADM

## 2018-08-12 RX ADMIN — IPRATROPIUM BROMIDE AND ALBUTEROL SULFATE 3 ML: .5; 3 SOLUTION RESPIRATORY (INHALATION) at 05:08

## 2018-08-12 RX ADMIN — IPRATROPIUM BROMIDE AND ALBUTEROL SULFATE 3 ML: .5; 3 SOLUTION RESPIRATORY (INHALATION) at 08:08

## 2018-08-12 RX ADMIN — METHYLPREDNISOLONE SODIUM SUCCINATE 125 MG: 125 INJECTION, POWDER, FOR SOLUTION INTRAMUSCULAR; INTRAVENOUS at 04:08

## 2018-08-12 RX ADMIN — ASPIRIN 325 MG ORAL TABLET 325 MG: 325 PILL ORAL at 05:08

## 2018-08-12 RX ADMIN — AZITHROMYCIN MONOHYDRATE 500 MG: 250 TABLET ORAL at 09:08

## 2018-08-12 NOTE — ED PROVIDER NOTES
Encounter Date: 8/12/2018    SCRIBE #1 NOTE: I, Stacia Davila, am scribing for, and in the presence of,  Dr. Patel. I have scribed the entire note.       History     Chief Complaint   Patient presents with    Shortness of Breath     Pt presented to the ED via [facility]. Pt c/o sob. Pt has a hx of COPD. Breathing treatment in process.      The patient is a 58 y.o. male with co-morbidities including: COPD, HTN, and seizures who presents to the ED with a complaint of SOB for one day. Pt is on 2L nasal cannula chronically at home, but states that he is unable to take the oxygen with him at work which is where he experiences the most SOB. Pt states that he is a , and believes the frequent exposure to chemicals are what exacerbated his symptoms. States that the breathing treatment he received with EMS provided moderate relief. Endorses a dry cough, but denies fevers, chills, or rhinorrhea. Notes intermittent tobacco usage.         The history is provided by the patient and medical records.     Review of patient's allergies indicates:   Allergen Reactions    Benazepril Swelling     Past Medical History:   Diagnosis Date    Asthma     COPD (chronic obstructive pulmonary disease)     Hypertension     Seizures      History reviewed. No pertinent surgical history.  Family History   Problem Relation Age of Onset    Cancer Father     Hypertension Sister     Stroke Sister     Stroke Brother     Diabetes Neg Hx     Heart disease Neg Hx      Social History     Tobacco Use    Smoking status: Current Every Day Smoker     Packs/day: 0.25     Types: Cigarettes    Smokeless tobacco: Never Used   Substance Use Topics    Alcohol use: No    Drug use: No     Review of Systems   Constitutional: Negative for chills and fever.   HENT: Negative for rhinorrhea.    Eyes: Negative for visual disturbance.   Respiratory: Positive for cough (dry) and shortness of breath.    Cardiovascular: Negative for chest pain.    Gastrointestinal: Negative for abdominal pain.   Musculoskeletal: Negative for gait problem.   Skin: Negative for wound.   Neurological: Negative for speech difficulty.   Psychiatric/Behavioral: Negative for confusion.       Physical Exam     Initial Vitals [08/12/18 1624]   BP Pulse Resp Temp SpO2   (!) 160/90 89 17 98.3 °F (36.8 °C) 99 %      MAP       --         Physical Exam    Nursing note and vitals reviewed.  Constitutional: He appears well-developed and well-nourished. No distress.   HENT:   Head: Normocephalic and atraumatic.   Eyes: EOM are normal. Pupils are equal, round, and reactive to light.   Neck: Normal range of motion. Neck supple.   Cardiovascular: Normal rate, regular rhythm, normal heart sounds and intact distal pulses.   Pulmonary/Chest: No respiratory distress. He has no wheezes. He has no rhonchi. He has no rales.   Diminished air movement diffusely   Abdominal: Soft. He exhibits no distension. There is no tenderness.   Musculoskeletal: Normal range of motion. He exhibits no edema or tenderness.   Neurological: He is alert and oriented to person, place, and time. He has normal strength. No cranial nerve deficit or sensory deficit.   Skin: Skin is warm and dry. Capillary refill takes less than 2 seconds. No rash noted.   Psychiatric: He has a normal mood and affect.         ED Course   Procedures  Labs Reviewed   CBC W/ AUTO DIFFERENTIAL - Abnormal; Notable for the following components:       Result Value    RDW 15.7 (*)     All other components within normal limits   COMPREHENSIVE METABOLIC PANEL - Abnormal; Notable for the following components:    Calcium 8.5 (*)     AST 65 (*)     ALT 48 (*)     All other components within normal limits   TROPONIN I - Abnormal; Notable for the following components:    Troponin I 0.031 (*)     All other components within normal limits     EKG Readings: (Independently Interpreted)   Rhythm: Normal Sinus Rhythm. Heart Rate: 82.   LVH. No significant ST  elevation       Imaging Results          X-Ray Chest PA And Lateral (Final result)  Result time 08/12/18 18:00:14    Final result by José Antonio Snell MD (08/12/18 18:00:14)                 Impression:      No acute process.    Flattening of the hemidiaphragms, suggestive of chronic obstructive airway disease.      Electronically signed by: José Antonio Snell MD  Date:    08/12/2018  Time:    18:00             Narrative:    EXAMINATION:  XR CHEST PA AND LATERAL    CLINICAL HISTORY:  Shortness of breath    TECHNIQUE:  PA and lateral views of the chest were performed.    COMPARISON:  08/08/2018    FINDINGS:  The trachea is unremarkable.  The cardiomediastinal silhouette is within normal limits.  The hilar structures are unremarkable.  There is flattening of the hemidiaphragms.  There is no evidence of free air beneath the hemidiaphragms.  There are no pleural effusions.  There is no evidence of a pneumothorax.  There is no evidence of pneumomediastinum.  No airspace opacities are present.  There are degenerative changes in the osseous structures.  The subcutaneous tissues are within normal limits.                              X-Rays:   Independently Interpreted Readings:   Chest X-Ray: Hyperinflated lungs. No conosolidation     Medical Decision Making:   History:   Old Medical Records: I decided to obtain old medical records.  Initial Assessment:   Emergent evaluation of SOB. History of COPD. Oxygen dependent. No significant hypoxia on initial exam. No sign of improvement with breathing treatment given by EMS in route. Symptoms are likely secondary to COPD exacerbation and tobacco use. I have a lower suspicion for an infectious etiology or ACS. Will get chest imaging, continue breathing treatment, given steroids, and check labs.   Independently Interpreted Test(s):   I have ordered and independently interpreted X-rays - see prior notes.  I have ordered and independently interpreted EKG Reading(s) - see prior notes  Clinical  Tests:   Lab Tests: Ordered and Reviewed  Radiological Study: Ordered and Reviewed  Medical Tests: Ordered and Reviewed              Attending Attestation:             Attending ED Notes:   6:20 PM  Lab work concerning for slightly elevated troponin. After breathing treatment, patient moving significantly more air. Aspirin given. Will admit for serial troponin and continue breathing treatment.              Clinical Impression:   The primary encounter diagnosis was Elevated troponin. A diagnosis of Shortness of breath was also pertinent to this visit.      Disposition:   Disposition: Admitted                        Aldo Patel MD  08/13/18 6441

## 2018-08-12 NOTE — ED TRIAGE NOTES
Using inhalers at home, has COPD and asthma.  SOB  That began last night and got worse this am . States he wasn't able to go to work b/c of his SOB. Denies  CP/fever/Cough/ dysuria. Deneis recent illnesses. Received an Atrovent  Albuterol treatment per EMS

## 2018-08-13 LAB
ANION GAP SERPL CALC-SCNC: 15 MMOL/L
BUN SERPL-MCNC: 13 MG/DL
CALCIUM SERPL-MCNC: 8.9 MG/DL
CHLORIDE SERPL-SCNC: 96 MMOL/L
CHOLEST SERPL-MCNC: 287 MG/DL
CHOLEST/HDLC SERPL: 3 {RATIO}
CO2 SERPL-SCNC: 26 MMOL/L
CREAT SERPL-MCNC: 0.8 MG/DL
EST. GFR  (AFRICAN AMERICAN): >60 ML/MIN/1.73 M^2
EST. GFR  (NON AFRICAN AMERICAN): >60 ML/MIN/1.73 M^2
ESTIMATED PA SYSTOLIC PRESSURE: 38.28
GLUCOSE SERPL-MCNC: 211 MG/DL
HDLC SERPL-MCNC: 97 MG/DL
HDLC SERPL: 33.8 %
LDLC SERPL CALC-MCNC: 177.8 MG/DL
MAGNESIUM SERPL-MCNC: 1.7 MG/DL
MITRAL VALVE MOBILITY: NORMAL
MITRAL VALVE REGURGITATION: NORMAL
NONHDLC SERPL-MCNC: 190 MG/DL
PHOSPHATE SERPL-MCNC: 2.7 MG/DL
POCT GLUCOSE: 135 MG/DL (ref 70–110)
POCT GLUCOSE: 173 MG/DL (ref 70–110)
POCT GLUCOSE: 176 MG/DL (ref 70–110)
POCT GLUCOSE: 194 MG/DL (ref 70–110)
POTASSIUM SERPL-SCNC: 3.9 MMOL/L
RETIRED EF AND QEF - SEE NOTES: 60 (ref 55–65)
SODIUM SERPL-SCNC: 137 MMOL/L
TRIGL SERPL-MCNC: 61 MG/DL
TROPONIN I SERPL DL<=0.01 NG/ML-MCNC: 0.01 NG/ML

## 2018-08-13 PROCEDURE — 97161 PT EVAL LOW COMPLEX 20 MIN: CPT

## 2018-08-13 PROCEDURE — 83735 ASSAY OF MAGNESIUM: CPT

## 2018-08-13 PROCEDURE — 84100 ASSAY OF PHOSPHORUS: CPT

## 2018-08-13 PROCEDURE — 80048 BASIC METABOLIC PNL TOTAL CA: CPT

## 2018-08-13 PROCEDURE — 25000242 PHARM REV CODE 250 ALT 637 W/ HCPCS: Performed by: PHYSICIAN ASSISTANT

## 2018-08-13 PROCEDURE — 93306 TTE W/DOPPLER COMPLETE: CPT

## 2018-08-13 PROCEDURE — 94640 AIRWAY INHALATION TREATMENT: CPT

## 2018-08-13 PROCEDURE — 27000221 HC OXYGEN, UP TO 24 HOURS

## 2018-08-13 PROCEDURE — 36415 COLL VENOUS BLD VENIPUNCTURE: CPT

## 2018-08-13 PROCEDURE — 84484 ASSAY OF TROPONIN QUANT: CPT

## 2018-08-13 PROCEDURE — 63600175 PHARM REV CODE 636 W HCPCS: Performed by: PHYSICIAN ASSISTANT

## 2018-08-13 PROCEDURE — 80061 LIPID PANEL: CPT

## 2018-08-13 PROCEDURE — G0378 HOSPITAL OBSERVATION PER HR: HCPCS

## 2018-08-13 PROCEDURE — G8978 MOBILITY CURRENT STATUS: HCPCS | Mod: CI

## 2018-08-13 PROCEDURE — 99226 PR SUBSEQUENT OBSERVATION CARE,LEVEL III: CPT | Mod: ,,, | Performed by: PHYSICIAN ASSISTANT

## 2018-08-13 PROCEDURE — 25000003 PHARM REV CODE 250: Performed by: PHYSICIAN ASSISTANT

## 2018-08-13 PROCEDURE — 93306 TTE W/DOPPLER COMPLETE: CPT | Mod: 26,,, | Performed by: INTERNAL MEDICINE

## 2018-08-13 PROCEDURE — 82962 GLUCOSE BLOOD TEST: CPT

## 2018-08-13 PROCEDURE — 97165 OT EVAL LOW COMPLEX 30 MIN: CPT

## 2018-08-13 PROCEDURE — 94761 N-INVAS EAR/PLS OXIMETRY MLT: CPT

## 2018-08-13 PROCEDURE — G8979 MOBILITY GOAL STATUS: HCPCS | Mod: CI

## 2018-08-13 PROCEDURE — G8980 MOBILITY D/C STATUS: HCPCS | Mod: CI

## 2018-08-13 RX ORDER — AMLODIPINE BESYLATE 5 MG/1
5 TABLET ORAL DAILY
Status: DISCONTINUED | OUTPATIENT
Start: 2018-08-13 | End: 2018-08-14 | Stop reason: HOSPADM

## 2018-08-13 RX ORDER — HYDRALAZINE HYDROCHLORIDE 25 MG/1
25 TABLET, FILM COATED ORAL EVERY 8 HOURS PRN
Status: DISCONTINUED | OUTPATIENT
Start: 2018-08-13 | End: 2018-08-14 | Stop reason: HOSPADM

## 2018-08-13 RX ORDER — BISMUTH SUBSALICYLATE 525 MG/30ML
30 LIQUID ORAL EVERY 6 HOURS PRN
Status: DISCONTINUED | OUTPATIENT
Start: 2018-08-13 | End: 2018-08-14 | Stop reason: HOSPADM

## 2018-08-13 RX ADMIN — Medication 30 ML: at 03:08

## 2018-08-13 RX ADMIN — ACETAMINOPHEN 650 MG: 325 TABLET ORAL at 10:08

## 2018-08-13 RX ADMIN — AZITHROMYCIN MONOHYDRATE 250 MG: 250 TABLET ORAL at 06:08

## 2018-08-13 RX ADMIN — HYDRALAZINE HYDROCHLORIDE 25 MG: 25 TABLET ORAL at 10:08

## 2018-08-13 RX ADMIN — HYDRALAZINE HYDROCHLORIDE 25 MG: 25 TABLET ORAL at 06:08

## 2018-08-13 RX ADMIN — IPRATROPIUM BROMIDE AND ALBUTEROL SULFATE 3 ML: .5; 3 SOLUTION RESPIRATORY (INHALATION) at 07:08

## 2018-08-13 RX ADMIN — IPRATROPIUM BROMIDE AND ALBUTEROL SULFATE 3 ML: .5; 3 SOLUTION RESPIRATORY (INHALATION) at 12:08

## 2018-08-13 RX ADMIN — Medication 30 ML: at 09:08

## 2018-08-13 RX ADMIN — IPRATROPIUM BROMIDE AND ALBUTEROL SULFATE 3 ML: .5; 3 SOLUTION RESPIRATORY (INHALATION) at 04:08

## 2018-08-13 RX ADMIN — FLUTICASONE FUROATE AND VILANTEROL TRIFENATATE 1 PUFF: 100; 25 POWDER RESPIRATORY (INHALATION) at 10:08

## 2018-08-13 RX ADMIN — PREDNISONE 40 MG: 20 TABLET ORAL at 08:08

## 2018-08-13 RX ADMIN — AMLODIPINE BESYLATE 5 MG: 5 TABLET ORAL at 09:08

## 2018-08-13 RX ADMIN — ONDANSETRON 8 MG: 8 TABLET, ORALLY DISINTEGRATING ORAL at 11:08

## 2018-08-13 RX ADMIN — PANTOPRAZOLE SODIUM 40 MG: 40 TABLET, DELAYED RELEASE ORAL at 08:08

## 2018-08-13 NOTE — PLAN OF CARE
08/13/18 0920   Discharge Assessment   Assessment Type Discharge Planning Assessment   Confirmed/corrected address and phone number on facesheet? Yes   Assessment information obtained from? Patient   Expected Length of Stay (days) 2   Communicated expected length of stay with patient/caregiver yes   Prior to hospitilization cognitive status: Alert/Oriented   Prior to hospitalization functional status: Independent   Current cognitive status: Alert/Oriented   Current Functional Status: Independent   Lives With other (see comments)  (roommate)   Able to Return to Prior Arrangements yes   Is patient able to care for self after discharge? Yes   Patient's perception of discharge disposition home or selfcare   Readmission Within The Last 30 Days no previous admission in last 30 days   Patient currently being followed by outpatient case management? No   Patient currently receives any other outside agency services? No   Equipment Currently Used at Home oxygen  (pt has a concentrator but needs portable tanks)   Do you have any problems affording any of your prescribed medications? No   Is the patient taking medications as prescribed? yes   Does the patient have transportation home? Yes   Transportation Available public transportation   Does the patient receive services at the [facility]? No   Discharge Plan A Home   Discharge Plan B Home Health   Patient/Family In Agreement With Plan yes     Dx: COPD exacerbation  Pharm: [facility]  Hosp f/u appt: Dr. Rick Hsieh ([facility]) on 8/20/18 at 1500    Patient has a concentrator at home but stated that he does not have any portable oxygen tanks. CM informed BILL Barlow ([id number]) of above. Patient will need a portable O2 tank at time of discharge. Will continue to follow.

## 2018-08-13 NOTE — H&P
[facility] Hospital Medicine  History & Physical    Patient Name: Baltazar Mccray  MRN: [id number]  Admission Date: 8/12/2018  Attending Physician: Viktoriya Barth MD   Primary Care Provider: Rick Hsieh MD    [facility] Medicine Team: [facility] E Alec Glover PA-C     Patient information was obtained from patient, past medical records and ER records.     Subjective:     Principal Problem:COPD exacerbation    Chief Complaint:   Chief Complaint   Patient presents with    Shortness of Breath     Pt presented to the ED via [facility]. Pt c/o sob. Pt has a hx of COPD. Breathing treatment in process.         HPI: Baltazar Mccray is a pleasant 58M with HTN, seizure history, COPD on 2L NC PRN at home who presents with SOB. While on his way to work he was not on oxygen and experienced SOB. He has a home oxygen concentrator and uses 2L O2 while at home/nightly, but does not use oxygen when leaving the house due to lack of equipment. He is often SOB while out of the house, but can usually control his syptoms with rescue inhalers. Today his SOB was refractory to his rescue inhaler. He continues to smoke. He denies fever, has dry cough, no CP, no NVD, no dysuria, no LE edema, endorses use of controller inhaler. He was recently seen in this ED 08/05 and 08/08 for SOB, given medrol dose pack discharged home after improvement seen in the ED.    ED work up remarkable for CXR clear, troponin mildly elevated at 0.031>0.018, EKG w/ chronic TWI V1, V2, V3 but w/o acute ischemic changes, WBC WNL, procal negative, on 2L NC with SpO2 96%, no respiratory distress.     Past Medical History:   Diagnosis Date    Asthma     COPD (chronic obstructive pulmonary disease)     Hypertension     Seizures        History reviewed. No pertinent surgical history.    Review of patient's allergies indicates:   Allergen Reactions    Benazepril Swelling       No current facility-administered medications on file prior to  encounter.      Current Outpatient Medications on File Prior to Encounter   Medication Sig    albuterol 90 mcg/actuation inhaler Inhale 2 puffs into the lungs every 6 (six) hours as needed for Wheezing.    benzonatate (TESSALON) 200 MG capsule Take 1 capsule (200 mg total) by mouth 3 (three) times daily as needed for Cough.    fluticasone-vilanterol (BREO ELLIPTA) 100-25 mcg/dose diskus inhaler Inhale 1 puff into the lungs once daily. Controller    ibuprofen (ADVIL,MOTRIN) 800 MG tablet Take 1 tablet (800 mg total) by mouth every 6 (six) hours as needed for Pain.    levetiracetam (KEPPRA) 1000 MG tablet Take 1,000 mg by mouth 2 (two) times daily.      omeprazole (PRILOSEC) 20 MG capsule Take 1 capsule (20 mg total) by mouth once daily.    tiotropium (SPIRIVA) 18 mcg inhalation capsule Inhale 1 capsule (18 mcg total) into the lungs once daily. Controller    [DISCONTINUED] methylPREDNISolone (MEDROL DOSEPACK) 4 mg tablet Use as directed    [DISCONTINUED] predniSONE (DELTASONE) 20 MG tablet Take 2 tablets (40 mg total) by mouth once daily. for 5 days    [DISCONTINUED] senna-docusate 8.6-50 mg (PERICOLACE) 8.6-50 mg per tablet Take 1 tablet by mouth 2 (two) times daily.     Family History     Problem Relation (Age of Onset)    Cancer Father    Hypertension Sister    Stroke Sister, Brother        Tobacco Use    Smoking status: Current Every Day Smoker     Packs/day: 0.25     Types: Cigarettes    Smokeless tobacco: Never Used   Substance and Sexual Activity    Alcohol use: No    Drug use: No    Sexual activity: Not on file     Review of Systems   Constitutional: Negative for chills, fatigue and fever.   Respiratory: Positive for cough, shortness of breath and wheezing. Negative for chest tightness.    Cardiovascular: Negative for chest pain, palpitations and leg swelling.   Gastrointestinal: Negative for abdominal pain, nausea and vomiting.   Genitourinary: Negative for difficulty urinating and dysuria.    Musculoskeletal: Negative for arthralgias and gait problem.   Neurological: Negative for dizziness and weakness.   Psychiatric/Behavioral: Negative for agitation and confusion.     Objective:     Vital Signs (Most Recent):  Temp: 96.6 °F (35.9 °C) (08/12/18 1943)  Pulse: 72 (08/12/18 2040)  Resp: 18 (08/12/18 2040)  BP: (!) 155/74 (08/12/18 1943)  SpO2: 96 % (08/12/18 2040) Vital Signs (24h Range):  Temp:  [96.6 °F (35.9 °C)-98.3 °F (36.8 °C)] 96.6 °F (35.9 °C)  Pulse:  [68-89] 72  Resp:  [16-22] 18  SpO2:  [94 %-100 %] 96 %  BP: (109-160)/(54-90) 155/74     Weight: 61.2 kg (135 lb)  Body mass index is 21.79 kg/m².    Physical Exam   Constitutional: He is oriented to person, place, and time. He appears well-developed and well-nourished. No distress.   HENT:   Head: Normocephalic and atraumatic.   Poor dentition    Eyes: EOM are normal. Pupils are equal, round, and reactive to light.   Cardiovascular: Normal rate and regular rhythm.   No murmur heard.  Pulmonary/Chest: Effort normal. No respiratory distress. He has no wheezes. He has rales (at bases).   On 2L NC, SpO2 96%, speaking in full sentences, good air movement on exam   Abdominal: Soft. Bowel sounds are normal. He exhibits no distension. There is no tenderness.   Musculoskeletal: Normal range of motion. He exhibits no edema.   Neurological: He is alert and oriented to person, place, and time. No cranial nerve deficit.   Skin: Skin is warm and dry. He is not diaphoretic.   Psychiatric: He has a normal mood and affect. His behavior is normal. Judgment and thought content normal.   Nursing note and vitals reviewed.        CRANIAL NERVES     CN III, IV, VI   Pupils are equal, round, and reactive to light.  Extraocular motions are normal.        Significant Labs:   CBC:   Recent Labs   Lab  08/12/18   1653   WBC  5.47   HGB  14.0   HCT  41.7   PLT  155     CMP:   Recent Labs   Lab  08/12/18   1653   NA  141   K  3.7   CL  101   CO2  27   GLU  80   BUN  11    CREATININE  0.8   CALCIUM  8.5*   PROT  7.6   ALBUMIN  3.5   BILITOT  0.5   ALKPHOS  98   AST  65*   ALT  48*   ANIONGAP  13   EGFRNONAA  >60.0     Cardiac Markers: No results for input(s): CKMB, MYOGLOBIN, BNP, TROPISTAT in the last 48 hours.  Troponin:   Recent Labs   Lab  08/12/18   1653  08/12/18 2000   TROPONINI  0.031*  0.018     TSH: No results for input(s): TSH in the last 4320 hours.  Urine Culture: No results for input(s): LABURIN in the last 48 hours.  Urine Studies: No results for input(s): COLORU, APPEARANCEUA, PHUR, SPECGRAV, PROTEINUA, GLUCUA, KETONESU, BILIRUBINUA, OCCULTUA, NITRITE, UROBILINOGEN, LEUKOCYTESUR, RBCUA, WBCUA, BACTERIA, SQUAMEPITHEL, HYALINECASTS in the last 48 hours.    Invalid input(s): WRIGHTSUR    Significant Imaging: CXR: I have reviewed all pertinent results/findings within the past 24 hours and my personal findings are:  clear  EKG: I have reviewed all pertinent results/findings within the past 24 hours and my personal findings are: TWI, no ischemic changes    Assessment/Plan:     * COPD exacerbation    - on 2L PRN at home, does not have portable O2 tank and therefore can't use oxygen when he leaves the house  - CM/SW to assist with obtaining proper equipment for d/c  - started on COPD pathway, cessation counseling provided  - wants to start chanxtix to quit smoking, needs PCP for monitoring  - nebs ATC, prednisone 40 mg daily, start azithromycin x 5 days  - continue home controllers, patient has insurance and admits compliance        Elevated troponin    - no CP, no new EKG changes. Old TWIs in V1, V2, V3  - trend for completeness 0.031>0.018  - echo in AM for WMA        Chronic respiratory failure with hypoxia and hypercapnia    - on 2L home O2 PRN, 96% on 2L now  - needs portable oxygen at d/c  - needs nebulizer at d/c        Tobacco abuse    - chantix at d/c, needs to establish PCP        Seizures    - not on keppra, no seizures for several years        Essential  hypertension    - historical diagnosis, not on antihypertensives   - trend          VTE Risk Mitigation (From admission, onward)        Ordered     Place JORJE hose  Until discontinued      08/12/18 1926     Place sequential compression device  Until discontinued      08/12/18 1926     IP VTE LOW RISK PATIENT  Once      08/12/18 1926             Alec Glover PA-C  Department of Hospital Medicine   [facility]

## 2018-08-13 NOTE — PLAN OF CARE
Problem: Patient Care Overview  Goal: Plan of Care Review  Outcome: Ongoing (interventions implemented as appropriate)  Will continue to monitor pt's safety and placed call light within reach.  Will continue to monitor pt's breathing patterns. Will continue to monitor pt for infection and educate pt on s/s of infection.

## 2018-08-13 NOTE — PT/OT/SLP EVAL
"Occupational Therapy   Evaluation/Discharge    Name: Baltazar Mccray  MRN: [id number]  Admitting Diagnosis:  COPD exacerbation      Recommendations:     Discharge Recommendations: home  Discharge Equipment Recommendations:  none  Barriers to discharge:  None    History:     Occupational Profile:  Living Environment: Pt lives with friend; first floor apartment with no AGUSTIN. Bathroom contains tub-shower combo with no DME.   Previous level of function: PTA, pt reports being independent with ADL and functional mobility at this time.   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 No pertinent surgical history.    Subjective     Chief Complaint: " I have a headache" " I have been walking back and forth to the bathroom"  Patient/Family stated goals: Return home  Communicated with: RN prior to session.  Pain/Comfort:  · Pain Rating 1: 4/10(headache)  · Pain Addressed 1: Reposition, Nurse notified  · Pain Rating Post-Intervention 1: 4/10    Objective:     Patient found with: SCD, oxygen    General Precautions: Standard, respiratory   Orthopedic Precautions:N/A   Braces: N/A     Occupational Performance:    Bed Mobility:    · Patient completed Rolling/Turning to Right with independence  · Patient completed Supine to Sit with independence  * Pt left seated EOB with RN notified     Functional Mobility/Transfers:  · Patient completed Sit <> Stand Transfer with independence  with  no assistive device   · Functional Mobility: Pt completed functional mobility in hallway with Supervision and no AD and 2L 02. Pt complaints of dizziness during beginning of mobility however " subsided after taking a standing break and pursed lip breathing. Pt demo no SOB throughout.     Activities of Daily Living:  · Lower Body Dressing: independence to kathy/doff shoes and socks while seated EOB    Cognitive/Visual Perceptual:  Cognitive/Psychosocial Skills:     -       Oriented to: Person, Place, Time and Situation   -       Follows Commands/attention:Follows multistep  commands  -       Communication: clear/fluent  -       Memory: No Deficits noted  -       Safety awareness/insight to disability: intact   -       Mood/Affect/Coping skills/emotional control: Appropriate to situation  Visual/Perceptual:      -Intact    Physical Exam:  Postural examination/scapula alignment:    -       Rounded shoulders  Skin integrity: Visible skin intact  Edema:  None noted  Sensation:    -       Intact  Motor Planning:    -       Intact  Dominant hand:    -       RUE  Upper Extremity Range of Motion:     -       Right Upper Extremity: WFL  -       Left Upper Extremity: WFL  Upper Extremity Strength:    -       Right Upper Extremity: WFL 5/5  -       Left Upper Extremity: WFL  5/5   Strength:    -       Right Upper Extremity: WFL  -       Left Upper Extremity: WFL  Fine Motor Coordination:    -       Intact  Gross motor coordination:   WFL ( Pt presented jittery 2/2 medication)     Patient left seated EOB with all lines intact, call button in reach and RN notified    [facility] 6 Click:  AMPA Total Score: 24    Treatment & Education:  -Pt edu on OT role/POC  -Importance of OOB activity with staff assistance  -Safety during functional t/f and mobility  - White board updated  - Multiple self care tasks completed-- assistance level noted above  - Edu pt on energy conservation techniques/pursed lip breathing to improve overall safety when returning to work    Education:    Assessment:     Baltazar Mccray is a 58 y.o. male with a medical diagnosis of COPD exacerbation. Pt  is currently performing ADLs, functional mobility &  "t/fs at baseline and displays age-appropriate strength, endurance & balance. OT services are not recommended at this time and patient is safe to D/C home.Performance deficits affecting function are impaired cardiopulmonary response to activity        Clinical Decision Makin.  OT Low:  "Pt evaluation falls under low complexity for evaluation coding due to performance deficits noted in 1-3 areas as stated above and 0 co-morbities affecting current functional status. Data obtained from problem focused assessments. No modifications or assistance was required for completion of evaluation. Only brief occupational profile and history review completed."     Plan:    D/c home with no therapy needs    This Plan of care has been discussed with the patient who was involved in its development and understands and is in agreement with the identified goals and treatment plan    GOALS:   Multidisciplinary Problems     Occupational Therapy Goals     Not on file          Multidisciplinary Problems (Resolved)        Problem: Occupational Therapy Goal    Goal Priority Disciplines Outcome Interventions   Occupational Therapy Goal   (Resolved)     OT, PT/OT Outcome(s) achieved    Description:  Pt is currently performing ADLs, functional mobility & t/fs at baseline and displays age-appropriate strength, endurance & balance. OT services are not recommended at this time. D/c home with no DME needs.                       Time Tracking:     OT Date of Treatment: 18  OT Start Time: 928  OT Stop Time: 942  OT Total Time (min): 14 min    Billable Minutes:Evaluation 14    Sveta delacruz OT  2018    "

## 2018-08-13 NOTE — ASSESSMENT & PLAN NOTE
- on 2L PRN at home, does not have portable O2 tank and therefore can't use oxygen when he leaves the house  - CM/SW to assist with obtaining proper equipment for d/c  - started on COPD pathway, cessation counseling provided  - wants to start chanxtix to quit smoking, needs PCP for monitoring  - nebs ATC, prednisone 40 mg daily, start azithromycin x 5 days  - continue home controllers, patient has insurance and admits compliance

## 2018-08-13 NOTE — PLAN OF CARE
Problem: Physical Therapy Goal  Goal: Physical Therapy Goal  Outcome: Outcome(s) achieved Date Met: 08/13/18  PT evaluation complete. No goals established as pt is baseline with mobility and has no acute PT needs at this time. D/C from PT services.    Ann Marie Cisneros, PT, DPT   8/13/2018  [phone]

## 2018-08-13 NOTE — ASSESSMENT & PLAN NOTE
- on 2L PRN at home, does not have portable O2 tank and therefore can't use oxygen when he leaves the house  - CM/SW to assist with obtaining proper equipment for d/c, 6 minute walk test pending  - started on COPD pathway, cessation counseling provided  - wants to start chanxtix to quit smoking, needs PCP for monitoring  - nebs ATC, prednisone 40 mg daily, start azithromycin x 5 days  - continue home controllers, patient has insurance and admits compliance

## 2018-08-13 NOTE — SUBJECTIVE & OBJECTIVE
Interval History: No events overnight. Patient reports improvement. Attempting to address his outpatient O2 needs.    Review of Systems   Constitutional: Negative for chills, fatigue and fever.   Respiratory: Positive for cough, shortness of breath and wheezing. Negative for chest tightness.    Cardiovascular: Negative for chest pain, palpitations and leg swelling.   Gastrointestinal: Negative for abdominal pain, nausea and vomiting.   Genitourinary: Negative for difficulty urinating and dysuria.   Musculoskeletal: Negative for arthralgias and gait problem.   Neurological: Negative for dizziness and weakness.   Psychiatric/Behavioral: Negative for agitation and confusion.     Objective:     Vital Signs (Most Recent):  Temp: 98.2 °F (36.8 °C) (08/13/18 0806)  Pulse: 65 (08/13/18 1006)  Resp: 20 (08/13/18 1006)  BP: (!) 191/90 (08/13/18 0806)  SpO2: 96 % (08/13/18 0806) Vital Signs (24h Range):  Temp:  [96.6 °F (35.9 °C)-98.3 °F (36.8 °C)] 98.2 °F (36.8 °C)  Pulse:  [60-89] 65  Resp:  [16-22] 20  SpO2:  [94 %-100 %] 96 %  BP: (109-191)/(54-90) 191/90     Weight: 62.2 kg (137 lb 2 oz)  Body mass index is 22.13 kg/m².  No intake or output data in the 24 hours ending 08/13/18 1117   Physical Exam   Constitutional: He is oriented to person, place, and time. He appears well-developed and well-nourished. No distress.   HENT:   Poor dentition    Cardiovascular: Normal rate and regular rhythm.   No murmur heard.  Pulmonary/Chest: Effort normal. No respiratory distress. He has no wheezes. He has rales (at bases).   On 2L NC, SpO2 96%, speaking in full sentences, good air movement on exam   Abdominal: Soft. Bowel sounds are normal. He exhibits no distension. There is no tenderness.   Musculoskeletal: He exhibits no edema or tenderness.   Neurological: He is alert and oriented to person, place, and time.   Skin: Skin is warm and dry.   Psychiatric: He has a normal mood and affect. His behavior is normal. Thought content normal.    Nursing note and vitals reviewed.      Significant Labs:   CBC:   Recent Labs   Lab  08/12/18   1653   WBC  5.47   HGB  14.0   HCT  41.7   PLT  155     CMP:   Recent Labs   Lab  08/12/18   1653  08/13/18   0610   NA  141  137   K  3.7  3.9   CL  101  96   CO2  27  26   GLU  80  211*   BUN  11  13   CREATININE  0.8  0.8   CALCIUM  8.5*  8.9   PROT  7.6   --    ALBUMIN  3.5   --    BILITOT  0.5   --    ALKPHOS  98   --    AST  65*   --    ALT  48*   --    ANIONGAP  13  15   EGFRNONAA  >60.0  >60.0       Significant Imaging: I have reviewed all pertinent imaging results/findings within the past 24 hours.

## 2018-08-13 NOTE — HPI
Baltazar Mccray is a pleasant 58M with HTN, seizure history, COPD on 2L NC PRN at home who presents with SOB. While on his way to work he was not on oxygen and experienced SOB. He has a home oxygen concentrator and uses 2L O2 while at home/nightly, but does not use oxygen when leaving the house due to lack of equipment. He is often SOB while out of the house, but can usually control his syptoms with rescue inhalers. Today his SOB was refractory to his rescue inhaler. He continues to smoke. He denies fever, has dry cough, no CP, no NVD, no dysuria, no LE edema, endorses use of controller inhaler. He was recently seen in this ED 08/05 and 08/08 for SOB, given medrol dose pack discharged home after improvement seen in the ED.    ED work up remarkable for CXR clear, troponin mildly elevated at 0.031>0.018, EKG w/ chronic TWI V1, V2, V3 but w/o acute ischemic changes, WBC WNL, procal negative, on 2L NC with SpO2 96%, no respiratory distress.

## 2018-08-13 NOTE — PROGRESS NOTES
[facility] Hospital Medicine  Progress Note    Patient Name: Baltazar Mccray  MRN: [id number]  Patient Class: OP- Observation   Admission Date: 8/12/2018  Length of Stay: 0 days  Attending Physician: Viktoriya Barth MD  Primary Care Provider: [facility] Medicine Team: [facility] HOSP MED E Maru Winston PA-C    Subjective:     Principal Problem:COPD exacerbation    HPI:  Baltazar Mccray is a pleasant 58M with HTN, seizure history, COPD on 2L NC PRN at home who presents with SOB. While on his way to work he was not on oxygen and experienced SOB. He has a home oxygen concentrator and uses 2L O2 while at home/nightly, but does not use oxygen when leaving the house due to lack of equipment. He is often SOB while out of the house, but can usually control his syptoms with rescue inhalers. Today his SOB was refractory to his rescue inhaler. He continues to smoke. He denies fever, has dry cough, no CP, no NVD, no dysuria, no LE edema, endorses use of controller inhaler. He was recently seen in this ED 08/05 and 08/08 for SOB, given medrol dose pack discharged home after improvement seen in the ED.    ED work up remarkable for CXR clear, troponin mildly elevated at 0.031>0.018, EKG w/ chronic TWI V1, V2, V3 but w/o acute ischemic changes, WBC WNL, procal negative, on 2L NC with SpO2 96%, no respiratory distress.     Hospital Course:  Patient admitted to observation for COPD exacerbation. Patient started on COPD pathway, improving on 2L O2. Likely discharge tomorrow.    Interval History: No events overnight. Patient reports improvement. Attempting to address his outpatient O2 needs.    Review of Systems   Constitutional: Negative for chills, fatigue and fever.   Respiratory: Positive for cough, shortness of breath and wheezing. Negative for chest tightness.    Cardiovascular: Negative for chest pain, palpitations and leg swelling.   Gastrointestinal: Negative for abdominal pain,  nausea and vomiting.   Genitourinary: Negative for difficulty urinating and dysuria.   Musculoskeletal: Negative for arthralgias and gait problem.   Neurological: Negative for dizziness and weakness.   Psychiatric/Behavioral: Negative for agitation and confusion.     Objective:     Vital Signs (Most Recent):  Temp: 98.2 °F (36.8 °C) (08/13/18 0806)  Pulse: 65 (08/13/18 1006)  Resp: 20 (08/13/18 1006)  BP: (!) 191/90 (08/13/18 0806)  SpO2: 96 % (08/13/18 0806) Vital Signs (24h Range):  Temp:  [96.6 °F (35.9 °C)-98.3 °F (36.8 °C)] 98.2 °F (36.8 °C)  Pulse:  [60-89] 65  Resp:  [16-22] 20  SpO2:  [94 %-100 %] 96 %  BP: (109-191)/(54-90) 191/90     Weight: 62.2 kg (137 lb 2 oz)  Body mass index is 22.13 kg/m².  No intake or output data in the 24 hours ending 08/13/18 1117   Physical Exam   Constitutional: He is oriented to person, place, and time. He appears well-developed and well-nourished. No distress.   HENT:   Poor dentition    Cardiovascular: Normal rate and regular rhythm.   No murmur heard.  Pulmonary/Chest: Effort normal. No respiratory distress. He has no wheezes. He has rales (at bases).   On 2L NC, SpO2 96%, speaking in full sentences, good air movement on exam   Abdominal: Soft. Bowel sounds are normal. He exhibits no distension. There is no tenderness.   Musculoskeletal: He exhibits no edema or tenderness.   Neurological: He is alert and oriented to person, place, and time.   Skin: Skin is warm and dry.   Psychiatric: He has a normal mood and affect. His behavior is normal. Thought content normal.   Nursing note and vitals reviewed.      Significant Labs:   CBC:   Recent Labs   Lab  08/12/18   1653   WBC  5.47   HGB  14.0   HCT  41.7   PLT  155     CMP:   Recent Labs   Lab  08/12/18   1653  08/13/18   0610   NA  141  137   K  3.7  3.9   CL  101  96   CO2  27  26   GLU  80  211*   BUN  11  13   CREATININE  0.8  0.8   CALCIUM  8.5*  8.9   PROT  7.6   --    ALBUMIN  3.5   --    BILITOT  0.5   --    ALKPHOS  98    --    AST  65*   --    ALT  48*   --    ANIONGAP  13  15   EGFRNONAA  >60.0  >60.0       Significant Imaging: I have reviewed all pertinent imaging results/findings within the past 24 hours.    Assessment/Plan:      * COPD exacerbation    - on 2L PRN at home, does not have portable O2 tank and therefore can't use oxygen when he leaves the house  - CM/SW to assist with obtaining proper equipment for d/c, 6 minute walk test pending  - started on COPD pathway, cessation counseling provided  - wants to start chanxtix to quit smoking, needs PCP for monitoring  - nebs ATC, prednisone 40 mg daily, start azithromycin x 5 days  - continue home controllers, patient has insurance and admits compliance        Tobacco abuse    - chantix at d/c, needs to establish PCP        Chronic respiratory failure with hypoxia and hypercapnia    - on 2L home O2 PRN, 96% on 2L now  - needs portable oxygen at d/c  - needs nebulizer at d/c        Elevated troponin    - no CP, no new EKG changes. Old TWIs in V1, V2, V3  - trend for completeness 0.031>0.018  - echo unremarkable        Seizures    - not on keppra, no seizures for several years        Essential hypertension    - historical diagnosis, not on antihypertensives   - trend          VTE Risk Mitigation (From admission, onward)        Ordered     Place JORJE hose  Until discontinued      08/12/18 1926     Place sequential compression device  Until discontinued      08/12/18 1926     IP VTE LOW RISK PATIENT  Once      08/12/18 1926              DUNG YungC  Department of Hospital Medicine   [facility]

## 2018-08-13 NOTE — SUBJECTIVE & OBJECTIVE
Past Medical History:   Diagnosis Date    Asthma     COPD (chronic obstructive pulmonary disease)     Hypertension     Seizures        History reviewed. No pertinent surgical history.    Review of patient's allergies indicates:   Allergen Reactions    Benazepril Swelling       No current facility-administered medications on file prior to encounter.      Current Outpatient Medications on File Prior to Encounter   Medication Sig    albuterol 90 mcg/actuation inhaler Inhale 2 puffs into the lungs every 6 (six) hours as needed for Wheezing.    benzonatate (TESSALON) 200 MG capsule Take 1 capsule (200 mg total) by mouth 3 (three) times daily as needed for Cough.    fluticasone-vilanterol (BREO ELLIPTA) 100-25 mcg/dose diskus inhaler Inhale 1 puff into the lungs once daily. Controller    ibuprofen (ADVIL,MOTRIN) 800 MG tablet Take 1 tablet (800 mg total) by mouth every 6 (six) hours as needed for Pain.    levetiracetam (KEPPRA) 1000 MG tablet Take 1,000 mg by mouth 2 (two) times daily.      omeprazole (PRILOSEC) 20 MG capsule Take 1 capsule (20 mg total) by mouth once daily.    tiotropium (SPIRIVA) 18 mcg inhalation capsule Inhale 1 capsule (18 mcg total) into the lungs once daily. Controller    [DISCONTINUED] methylPREDNISolone (MEDROL DOSEPACK) 4 mg tablet Use as directed    [DISCONTINUED] predniSONE (DELTASONE) 20 MG tablet Take 2 tablets (40 mg total) by mouth once daily. for 5 days    [DISCONTINUED] senna-docusate 8.6-50 mg (PERICOLACE) 8.6-50 mg per tablet Take 1 tablet by mouth 2 (two) times daily.     Family History     Problem Relation (Age of Onset)    Cancer Father    Hypertension Sister    Stroke Sister, Brother        Tobacco Use    Smoking status: Current Every Day Smoker     Packs/day: 0.25     Types: Cigarettes    Smokeless tobacco: Never Used   Substance and Sexual Activity    Alcohol use: No    Drug use: No    Sexual activity: Not on file     Review of Systems   Constitutional:  Negative for chills, fatigue and fever.   Respiratory: Positive for cough, shortness of breath and wheezing. Negative for chest tightness.    Cardiovascular: Negative for chest pain, palpitations and leg swelling.   Gastrointestinal: Negative for abdominal pain, nausea and vomiting.   Genitourinary: Negative for difficulty urinating and dysuria.   Musculoskeletal: Negative for arthralgias and gait problem.   Neurological: Negative for dizziness and weakness.   Psychiatric/Behavioral: Negative for agitation and confusion.     Objective:     Vital Signs (Most Recent):  Temp: 96.6 °F (35.9 °C) (08/12/18 1943)  Pulse: 72 (08/12/18 2040)  Resp: 18 (08/12/18 2040)  BP: (!) 155/74 (08/12/18 1943)  SpO2: 96 % (08/12/18 2040) Vital Signs (24h Range):  Temp:  [96.6 °F (35.9 °C)-98.3 °F (36.8 °C)] 96.6 °F (35.9 °C)  Pulse:  [68-89] 72  Resp:  [16-22] 18  SpO2:  [94 %-100 %] 96 %  BP: (109-160)/(54-90) 155/74     Weight: 61.2 kg (135 lb)  Body mass index is 21.79 kg/m².    Physical Exam   Constitutional: He is oriented to person, place, and time. He appears well-developed and well-nourished. No distress.   HENT:   Head: Normocephalic and atraumatic.   Poor dentition    Eyes: EOM are normal. Pupils are equal, round, and reactive to light.   Cardiovascular: Normal rate and regular rhythm.   No murmur heard.  Pulmonary/Chest: Effort normal. No respiratory distress. He has no wheezes. He has rales (at bases).   On 2L NC, SpO2 96%, speaking in full sentences, good air movement on exam   Abdominal: Soft. Bowel sounds are normal. He exhibits no distension. There is no tenderness.   Musculoskeletal: Normal range of motion. He exhibits no edema.   Neurological: He is alert and oriented to person, place, and time. No cranial nerve deficit.   Skin: Skin is warm and dry. He is not diaphoretic.   Psychiatric: He has a normal mood and affect. His behavior is normal. Judgment and thought content normal.   Nursing note and vitals  reviewed.        CRANIAL NERVES     CN III, IV, VI   Pupils are equal, round, and reactive to light.  Extraocular motions are normal.        Significant Labs:   CBC:   Recent Labs   Lab  08/12/18 1653   WBC  5.47   HGB  14.0   HCT  41.7   PLT  155     CMP:   Recent Labs   Lab  08/12/18   1653   NA  141   K  3.7   CL  101   CO2  27   GLU  80   BUN  11   CREATININE  0.8   CALCIUM  8.5*   PROT  7.6   ALBUMIN  3.5   BILITOT  0.5   ALKPHOS  98   AST  65*   ALT  48*   ANIONGAP  13   EGFRNONAA  >60.0     Cardiac Markers: No results for input(s): CKMB, MYOGLOBIN, BNP, TROPISTAT in the last 48 hours.  Troponin:   Recent Labs   Lab  08/12/18 1653 08/12/18 2000   TROPONINI  0.031*  0.018     TSH: No results for input(s): TSH in the last 4320 hours.  Urine Culture: No results for input(s): LABURIN in the last 48 hours.  Urine Studies: No results for input(s): COLORU, APPEARANCEUA, PHUR, SPECGRAV, PROTEINUA, GLUCUA, KETONESU, BILIRUBINUA, OCCULTUA, NITRITE, UROBILINOGEN, LEUKOCYTESUR, RBCUA, WBCUA, BACTERIA, SQUAMEPITHEL, HYALINECASTS in the last 48 hours.    Invalid input(s): WRIGHTSUR    Significant Imaging: CXR: I have reviewed all pertinent results/findings within the past 24 hours and my personal findings are:  clear  EKG: I have reviewed all pertinent results/findings within the past 24 hours and my personal findings are: TWI, no ischemic changes

## 2018-08-13 NOTE — PLAN OF CARE
Problem: Occupational Therapy Goal  Goal: Occupational Therapy Goal  Pt is currently performing ADLs, functional mobility & t/fs at baseline and displays age-appropriate strength, endurance & balance. OT services are not recommended at this time. D/c home with no DME needs.     Outcome: Ongoing (interventions implemented as appropriate)  Evaluation completed. Initiate POC.   Sveta delacruz OT  8/13/2018

## 2018-08-13 NOTE — PLAN OF CARE
SW spoke with Margarette ([facility]) about pt O2 needs.  SW informed that pt in need of Walk test with the room air at rest, room air with activity and activity with 02 and the liter flow. A new order with the qualifying sat. and diagnosis. The medical team is working on this request.                  Brea Latif, MSW, [facility] [place]  [facility]  [id number]

## 2018-08-13 NOTE — PROGRESS NOTES
Home Oxygen Evaluation    Date Performed: 2018    1) Patient's Home O2 Sat on room air, while at rest:   94%        If O2 sats on room air at rest are 88% or below, patient qualifies. No additional testing needed. Document N/A in steps 2 and 3. If 89% or above, complete steps 2.      2) Patient's O2 Sat on room air while exercisin %    If O2 sats on room air while exercising remain 89% or above patient does not qualify, no further testing needed Document N/A in step 3. If O2 sats on room air while exercising are 88% or below, continue to step 3.

## 2018-08-13 NOTE — ASSESSMENT & PLAN NOTE
- no CP, no new EKG changes. Old TWIs in V1, V2, V3  - trend for completeness 0.031>0.018  - echo in AM for WMA

## 2018-08-13 NOTE — ASSESSMENT & PLAN NOTE
- no CP, no new EKG changes. Old TWIs in V1, V2, V3  - trend for completeness 0.031>0.018  - echo unremarkable

## 2018-08-13 NOTE — HOSPITAL COURSE
Patient admitted to observation for COPD exacerbation. Patient started on COPD pathway, improving on 2L O2. Patient was unable to qualify for portable oxygen concentrator per 6 minute walk test. Prior to discharge patient ambulated independently, without difficulty. Will continue Azithromycin and steroids at discharge.

## 2018-08-13 NOTE — PT/OT/SLP EVAL
"Physical Therapy Evaluation and Discharge Note    Patient Name:  Baltazar Mccray   MRN:  [id number]    Recommendations:     Discharge Recommendations:  home   Discharge Equipment Recommendations: none   Barriers to discharge: None    Assessment:     Baltazar Mccray is a 58 y.o. male admitted with a medical diagnosis of COPD exacerbation. Pt able to perform functional mobility without physical assist or use of DME. Ambulated greater than household distance initially with mild gait instability but improved as gait progressed; no overt LOB. Education on breathing technique and energy conservation techniques provided. At this time, patient is functioning at their prior level of function and does not require further acute PT services.     Recent Surgery: * No surgery found *      Plan:     During this hospitalization, patient does not require further acute PT services.  Please re-consult if situation changes.     Plan of Care Reviewed with: patient    Subjective     Communicated with RN prior to session.  Patient found supine upon PT entry to room, agreeable to evaluation.      Chief Complaint: none noted   Patient comments/goals: "I've been getting up and going to the bathroom."  Pain/Comfort:  · Pain Rating 1: 4/10  · Location 1: head  · Pain Addressed 1: Nurse notified, Reposition, Distraction    Patients cultural, spiritual, Methodist conflicts given the current situation: none noted     Living Environment:  Pt lives with a friend in a 1st floor apt with 0 AGUSTIN. Pt reports that PTA he was (I) with ambulation and ADLs. Pt works as  at [employer].  Prior to admission, patients level of function was indep.  Patient has the following equipment: oxygen.  Upon discharge, patient will have assistance from friends.    Objective:     Patient found with: SCD, oxygen     General Precautions: Standard,     Orthopedic Precautions:N/A   Braces: N/A     Exams:  · Cognitive Exam:  Patient is oriented to Person, Place, Time and " Situation and follows 100% of two-step commands   · Sensation:    · -       Intact  · RLE ROM: WFL  · RLE Strength: WFL  · LLE ROM: WFL  · LLE Strength: WFL    Functional Mobility:  · Bed Mobility:     · Supine to Sit: modified independence with HOB elevated  · Transfers:     · Sit to Stand:  supervision with no AD  · Gait: ~300 ft with no AD, initially with SBA but progressed to supervision   · 2L portable O2 in place throughout  · Initially with dizziness and mild gait instability, requiring cues for standing rest break and pursed lip breathing. Dizziness subsided with rest and instability improved with progression of gait    AM-PAC 6 CLICK MOBILITY  Total Score:23       Therapeutic Activities and Exercises:   Pt educated on role of PT and PT POC including plan to d/c IP PT services at this time. Pt instructed to contact medical team if therapy needs arise during hospital admission. Pt verbalized understanding.   Pt educated on pursed lip breathing technique, energy conservation techniques, and self-pacing. Pt v/u.    Patient left seated EOB with all lines intact, call button in reach and RN notified.    GOALS:   Multidisciplinary Problems     Physical Therapy Goals     Not on file          Multidisciplinary Problems (Resolved)        Problem: Physical Therapy Goal    Goal Priority Disciplines Outcome Goal Variances Interventions   Physical Therapy Goal   (Resolved)     PT, PT/OT Outcome(s) achieved                     History:     Past Medical History:   Diagnosis Date    Asthma     COPD (chronic obstructive pulmonary disease)     Hypertension     Seizures        History reviewed. No pertinent surgical history.    Clinical Decision Making:     History  Co-morbidities and personal factors that may impact the plan of care Examination  Body Structures and Functions, activity limitations and participation restrictions that may impact the plan of care Clinical Presentation   Decision Making/ Complexity Score    Co-morbidities:   [] Time since onset of injury / illness / exacerbation  [x] Status of current condition  []Patient's cognitive status and safety concerns    [] Multiple Medical Problems (see med hx)  Personal Factors:   [] Patient's age  [] Prior Level of function   [] Patient's home situation (environment and family support)  [] Patient's level of motivation  [] Expected progression of patient      HISTORY:(criteria)    [] 02457 - no personal factors/history    [x] 32250 - has 1-2 personal factor/comorbidity     [] 09130 - has >3 personal factor/comorbidity     Body Regions:  [] Objective examination findings  [] Head     []  Neck  [] Trunk   [] Upper Extremity  [] Lower Extremity    Body Systems:  [] For communication ability, affect, cognition, language, and learning style: the assessment of the ability to make needs known, consciousness, orientation (person, place, and time), expected emotional /behavioral responses, and learning preferences (eg, learning barriers, education  needs)  [x] For the neuromuscular system: a general assessment of gross coordinated movement (eg, balance, gait, locomotion, transfers, and transitions) and motor function  (motor control and motor learning)  [x] For the musculoskeletal system: the assessment of gross symmetry, gross range of motion, gross strength, height, and weight  [] For the integumentary system: the assessment of pliability(texture), presence of scar formation, skin color, and skin integrity  [x] For cardiovascular/pulmonary system: the assessment of heart rate, respiratory rate, blood pressure, and edema     Activity limitations:    [] Patient's cognitive status and saf ety concerns          [] Status of current condition      [] Weight bearing restriction  [x] Cardiopulmunary Restriction    Participation Restrictions:   [] Goals and goal agreement with the patient     [] Rehab potential (prognosis) and probable outcome      Examination of Body System:  (criteria)    [] 85046 - addressing 1-2 elements    [] 12835 - addressing a total of 3 or more elements     [x] 04415 -  Addressing a total of 4 or more elements         Clinical Presentation: (criteria)  Stable - 73245     On examination of body system using standardized tests and measures patient presents with 4 or more elements from any of the following: body structures and functions, activity limitations, and/or participation restrictions.  Leading to a clinical presentation that is considered stable and/or uncomplicated                              Clinical Decision Making  (Eval Complexity):  Low- 37378     Time Tracking:     PT Received On: 08/13/18  PT Start Time: 0930     PT Stop Time: 0945  PT Total Time (min): 15 min     Billable Minutes: Evaluation 15  (co-eval with OT)    Ann Marie Cisneros, PT, DPT   8/13/2018  [phone]

## 2018-08-14 VITALS
SYSTOLIC BLOOD PRESSURE: 144 MMHG | TEMPERATURE: 99 F | HEIGHT: 66 IN | DIASTOLIC BLOOD PRESSURE: 88 MMHG | RESPIRATION RATE: 18 BRPM | HEART RATE: 89 BPM | WEIGHT: 137.13 LBS | BODY MASS INDEX: 22.04 KG/M2 | OXYGEN SATURATION: 99 %

## 2018-08-14 LAB
ANION GAP SERPL CALC-SCNC: 7 MMOL/L
BUN SERPL-MCNC: 12 MG/DL
CALCIUM SERPL-MCNC: 9.8 MG/DL
CHLORIDE SERPL-SCNC: 96 MMOL/L
CO2 SERPL-SCNC: 33 MMOL/L
CREAT SERPL-MCNC: 0.7 MG/DL
EST. GFR  (AFRICAN AMERICAN): >60 ML/MIN/1.73 M^2
EST. GFR  (NON AFRICAN AMERICAN): >60 ML/MIN/1.73 M^2
GLUCOSE SERPL-MCNC: 86 MG/DL
MAGNESIUM SERPL-MCNC: 2.2 MG/DL
PHOSPHATE SERPL-MCNC: 2.7 MG/DL
POCT GLUCOSE: 92 MG/DL (ref 70–110)
POTASSIUM SERPL-SCNC: 4.5 MMOL/L
SODIUM SERPL-SCNC: 136 MMOL/L

## 2018-08-14 PROCEDURE — 83735 ASSAY OF MAGNESIUM: CPT

## 2018-08-14 PROCEDURE — 25000003 PHARM REV CODE 250: Performed by: PHYSICIAN ASSISTANT

## 2018-08-14 PROCEDURE — 25000242 PHARM REV CODE 250 ALT 637 W/ HCPCS: Performed by: PHYSICIAN ASSISTANT

## 2018-08-14 PROCEDURE — 84100 ASSAY OF PHOSPHORUS: CPT

## 2018-08-14 PROCEDURE — 36415 COLL VENOUS BLD VENIPUNCTURE: CPT

## 2018-08-14 PROCEDURE — 99225 PR SUBSEQUENT OBSERVATION CARE,LEVEL II: CPT | Mod: ,,, | Performed by: PHYSICIAN ASSISTANT

## 2018-08-14 PROCEDURE — 80048 BASIC METABOLIC PNL TOTAL CA: CPT

## 2018-08-14 PROCEDURE — 63600175 PHARM REV CODE 636 W HCPCS: Performed by: PHYSICIAN ASSISTANT

## 2018-08-14 PROCEDURE — G0378 HOSPITAL OBSERVATION PER HR: HCPCS

## 2018-08-14 RX ORDER — FLUTICASONE FUROATE AND VILANTEROL 100; 25 UG/1; UG/1
1 POWDER RESPIRATORY (INHALATION) DAILY
Qty: 14 EACH | Refills: 11 | Status: ON HOLD | OUTPATIENT
Start: 2018-08-15 | End: 2018-10-03

## 2018-08-14 RX ORDER — AMLODIPINE BESYLATE 5 MG/1
5 TABLET ORAL DAILY
Qty: 30 TABLET | Refills: 11 | Status: ON HOLD | OUTPATIENT
Start: 2018-08-15 | End: 2018-10-04 | Stop reason: SDUPTHER

## 2018-08-14 RX ORDER — AZITHROMYCIN 250 MG/1
250 TABLET, FILM COATED ORAL DAILY
Qty: 2 TABLET | Refills: 0 | Status: ON HOLD | OUTPATIENT
Start: 2018-08-14 | End: 2018-10-03

## 2018-08-14 RX ORDER — IPRATROPIUM BROMIDE AND ALBUTEROL SULFATE 2.5; .5 MG/3ML; MG/3ML
3 SOLUTION RESPIRATORY (INHALATION) EVERY 4 HOURS
Qty: 1 BOX | Refills: 0 | Status: ON HOLD | OUTPATIENT
Start: 2018-08-14 | End: 2018-10-03

## 2018-08-14 RX ORDER — ALBUTEROL SULFATE 0.83 MG/ML
2.5 SOLUTION RESPIRATORY (INHALATION) EVERY 4 HOURS PRN
Qty: 1 BOX | Refills: 11 | Status: SHIPPED | OUTPATIENT
Start: 2018-08-14 | End: 2019-08-14

## 2018-08-14 RX ORDER — PREDNISONE 20 MG/1
40 TABLET ORAL DAILY
Qty: 20 TABLET | Refills: 0 | Status: SHIPPED | OUTPATIENT
Start: 2018-08-15 | End: 2018-08-25

## 2018-08-14 RX ADMIN — AMLODIPINE BESYLATE 5 MG: 5 TABLET ORAL at 08:08

## 2018-08-14 RX ADMIN — FLUTICASONE FUROATE AND VILANTEROL TRIFENATATE 1 PUFF: 100; 25 POWDER RESPIRATORY (INHALATION) at 08:08

## 2018-08-14 RX ADMIN — PREDNISONE 40 MG: 20 TABLET ORAL at 08:08

## 2018-08-14 RX ADMIN — PANTOPRAZOLE SODIUM 40 MG: 40 TABLET, DELAYED RELEASE ORAL at 08:08

## 2018-08-14 RX ADMIN — POLYETHYLENE GLYCOL 3350 17 G: 17 POWDER, FOR SOLUTION ORAL at 08:08

## 2018-08-14 RX ADMIN — HYDRALAZINE HYDROCHLORIDE 25 MG: 25 TABLET ORAL at 04:08

## 2018-08-14 NOTE — PLAN OF CARE
Problem: Patient Care Overview  Goal: Plan of Care Review  Outcome: Ongoing (interventions implemented as appropriate)  POC reviewed with pt. Patient assessed for safety and comfort. Bed in low position. Call light within reach. VS as charted.

## 2018-08-14 NOTE — PLAN OF CARE
BILL contacted [facility] about pt O2.  BILL informed that pt orders need to be updated to reflect pt needs as if pt has no O2.  Pt current orders are dated 5/19/2017.  [facility] in need of new orders for O2 request.  BILL paged PA about this matter.             Brea Latif, MSW, LCSW  [facility]  [id number]

## 2018-08-14 NOTE — PLAN OF CARE
08/14/2018      Baltazar Mccray  [address] [address]          [facility] Medicine Dept.  [facility] [address] [address] [phone] after hours  [phone] fax Baltazar Mccray has been hospitalized at the [facility] since 8/12/2018- 8/14/2018.  Please excuse the patient from duties.  Patient may return on 8/15/2018.      Please contact me if you have any questions.                  __________________________  Cheyv Rodriguez PA-C  08/14/2018

## 2018-08-14 NOTE — PLAN OF CARE
Patient discharged to home. Discharge instructions given. Prescriptions given. Vital signs within normal limits. No distress noted.

## 2018-08-14 NOTE — PLAN OF CARE
Problem: Patient Care Overview  Goal: Plan of Care Review  Outcome: Ongoing (interventions implemented as appropriate)  Will continue to monitor pt's safety, place call light within pt's reach, will continue to monitor pt's breathing pattern and assess pt's O2 sats.  Will continue to monitor pt for s/s of infection.

## 2018-08-14 NOTE — PLAN OF CARE
"   08/14/18 1246   Final Note   Assessment Type Final Discharge Note     Patient discharged home with no needs on 8/14/18. Per "evaluation for home oxygen" results the patient does not qualify for home oxygen.   "

## 2018-08-17 NOTE — ASSESSMENT & PLAN NOTE
Did not meet qualifications for portable concentrator, symptoms much improved following steroids and antibiotics  - continue 2L PRN at home  - started on COPD pathway, cessation counseling provided  - wants to start chanxtix to quit smoking, needs PCP for monitoring  - nebs ATC, prednisone 40 mg daily, start azithromycin x 5 days  - continue home controllers, patient has insurance and admits compliance- refills provided

## 2018-09-20 ENCOUNTER — HOSPITAL ENCOUNTER (EMERGENCY)
Facility: HOSPITAL | Age: 58
Discharge: HOME | End: 2018-09-20
Attending: EMERGENCY MEDICINE
Payer: MEDICAID

## 2018-09-20 VITALS
OXYGEN SATURATION: 91 % | TEMPERATURE: 98 F | HEART RATE: 72 BPM | SYSTOLIC BLOOD PRESSURE: 144 MMHG | DIASTOLIC BLOOD PRESSURE: 71 MMHG | RESPIRATION RATE: 19 BRPM

## 2018-09-20 DIAGNOSIS — R06.02 SHORTNESS OF BREATH: Primary | ICD-10-CM

## 2018-09-20 LAB
ALBUMIN SERPL BCP-MCNC: 3.6 G/DL
ALLENS TEST: ABNORMAL
ALP SERPL-CCNC: 111 U/L
ALT SERPL W/O P-5'-P-CCNC: 36 U/L
ANION GAP SERPL CALC-SCNC: 12 MMOL/L
AST SERPL-CCNC: 48 U/L
BASOPHILS # BLD AUTO: 0.06 K/UL
BASOPHILS NFR BLD: 0.9 %
BILIRUB SERPL-MCNC: 0.3 MG/DL
BNP SERPL-MCNC: 32 PG/ML
BUN SERPL-MCNC: 9 MG/DL
CALCIUM SERPL-MCNC: 8.8 MG/DL
CHLORIDE SERPL-SCNC: 101 MMOL/L
CO2 SERPL-SCNC: 24 MMOL/L
CREAT SERPL-MCNC: 0.8 MG/DL
DELSYS: ABNORMAL
DIFFERENTIAL METHOD: ABNORMAL
EOSINOPHIL # BLD AUTO: 0.1 K/UL
EOSINOPHIL NFR BLD: 1.8 %
ERYTHROCYTE [DISTWIDTH] IN BLOOD BY AUTOMATED COUNT: 16.6 %
EST. GFR  (AFRICAN AMERICAN): >60 ML/MIN/1.73 M^2
EST. GFR  (NON AFRICAN AMERICAN): >60 ML/MIN/1.73 M^2
FLOW: 0
GLUCOSE SERPL-MCNC: 99 MG/DL
HCO3 UR-SCNC: 27.8 MMOL/L (ref 24–28)
HCT VFR BLD AUTO: 44.4 %
HGB BLD-MCNC: 14.6 G/DL
IMM GRANULOCYTES # BLD AUTO: 0.01 K/UL
IMM GRANULOCYTES NFR BLD AUTO: 0.1 %
LYMPHOCYTES # BLD AUTO: 2.9 K/UL
LYMPHOCYTES NFR BLD: 42.1 %
MAGNESIUM SERPL-MCNC: 2.1 MG/DL
MCH RBC QN AUTO: 28.9 PG
MCHC RBC AUTO-ENTMCNC: 32.9 G/DL
MCV RBC AUTO: 88 FL
MODE: ABNORMAL
MONOCYTES # BLD AUTO: 0.9 K/UL
MONOCYTES NFR BLD: 13 %
NEUTROPHILS # BLD AUTO: 2.9 K/UL
NEUTROPHILS NFR BLD: 42.1 %
NRBC BLD-RTO: 0 /100 WBC
PCO2 BLDA: 53.3 MMHG (ref 35–45)
PH SMN: 7.33 [PH] (ref 7.35–7.45)
PHOSPHATE SERPL-MCNC: 3.8 MG/DL
PLATELET # BLD AUTO: 201 K/UL
PMV BLD AUTO: 10.2 FL
PO2 BLDA: 45 MMHG (ref 40–60)
POC BE: 2 MMOL/L
POC SATURATED O2: 77 % (ref 95–100)
POC TCO2: 29 MMOL/L (ref 24–29)
POTASSIUM SERPL-SCNC: 4.1 MMOL/L
PROT SERPL-MCNC: 7.7 G/DL
RBC # BLD AUTO: 5.05 M/UL
SAMPLE: ABNORMAL
SITE: ABNORMAL
SODIUM SERPL-SCNC: 137 MMOL/L
TROPONIN I SERPL DL<=0.01 NG/ML-MCNC: 0.03 NG/ML
WBC # BLD AUTO: 6.77 K/UL

## 2018-09-20 PROCEDURE — 94640 AIRWAY INHALATION TREATMENT: CPT

## 2018-09-20 PROCEDURE — 99900035 HC TECH TIME PER 15 MIN (STAT)

## 2018-09-20 PROCEDURE — 99285 EMERGENCY DEPT VISIT HI MDM: CPT | Mod: 25

## 2018-09-20 PROCEDURE — 85025 COMPLETE CBC W/AUTO DIFF WBC: CPT

## 2018-09-20 PROCEDURE — 83735 ASSAY OF MAGNESIUM: CPT

## 2018-09-20 PROCEDURE — 94761 N-INVAS EAR/PLS OXIMETRY MLT: CPT

## 2018-09-20 PROCEDURE — 83880 ASSAY OF NATRIURETIC PEPTIDE: CPT

## 2018-09-20 PROCEDURE — 82803 BLOOD GASES ANY COMBINATION: CPT

## 2018-09-20 PROCEDURE — 99284 EMERGENCY DEPT VISIT MOD MDM: CPT | Mod: ,,, | Performed by: EMERGENCY MEDICINE

## 2018-09-20 PROCEDURE — 84484 ASSAY OF TROPONIN QUANT: CPT

## 2018-09-20 PROCEDURE — 25000242 PHARM REV CODE 250 ALT 637 W/ HCPCS: Performed by: EMERGENCY MEDICINE

## 2018-09-20 PROCEDURE — 84100 ASSAY OF PHOSPHORUS: CPT

## 2018-09-20 PROCEDURE — 27000221 HC OXYGEN, UP TO 24 HOURS

## 2018-09-20 PROCEDURE — 80053 COMPREHEN METABOLIC PANEL: CPT

## 2018-09-20 RX ORDER — PREDNISONE 20 MG/1
40 TABLET ORAL DAILY
Qty: 10 TABLET | Refills: 0 | Status: SHIPPED | OUTPATIENT
Start: 2018-09-20 | End: 2018-09-25

## 2018-09-20 RX ORDER — IPRATROPIUM BROMIDE AND ALBUTEROL SULFATE 2.5; .5 MG/3ML; MG/3ML
3 SOLUTION RESPIRATORY (INHALATION)
Status: COMPLETED | OUTPATIENT
Start: 2018-09-20 | End: 2018-09-20

## 2018-09-20 RX ADMIN — IPRATROPIUM BROMIDE AND ALBUTEROL SULFATE 3 ML: .5; 3 SOLUTION RESPIRATORY (INHALATION) at 11:09

## 2018-09-20 NOTE — ED TRIAGE NOTES
Pt went to work this morning and had sudden onset SOB. Pt is calm. Respirations are spontaneous with normal rate and effort after duo neb and 125 mg of IV Solumedrol.

## 2018-09-20 NOTE — ED NOTES
Patient verbalized understanding of discharge instructions including follow up and medications. Patient has steady gait and stable vitals. Patients 02 SATs are 91-92% on RA while at rest. MD Ramirez notified and patient was discharged.

## 2018-09-20 NOTE — ED NOTES
Patient maintained 02 SATs at 96% after two laps around RWR. MD Ramirez notified. Patient states he is ready to go. Steady gait observed.

## 2018-09-20 NOTE — ED PROVIDER NOTES
Encounter Date: 9/20/2018       History     Chief Complaint   Patient presents with    Shortness of Breath     Pt presents with SOB that increases with exertion. Pt has a history of COPD and asthma. Pt used his inhaler without any changes in symptoms. End tidal CO2 was in the 30's upon EMS arrival. Pt is unlabored.      57 yo M w/ PMH significant for COPD, asthma, HTN, and seizure disorder who presents to the ED via EMS after collapsing at the bus stop. Patient reports waking up for work this morning in normal state of health. Reports walking to the bus stop at which point he suddenly felt short of breath, collapsed and was not able to catch his breath. EMS stabilized patient w/ duonebs and solumedrol. Upon arrival to ED, patient reports breathing comfortably and denies shortness of breath. Sats 88-90 on RA. After talking for some time, patient sats dropped to 85; placed on 2L NC. Patient reports being compliant w/ all medications. Current smoker. Continues to use 2L NC at nights and as needed after work. Denies change in sputum quality or frequency. Reports dry cough. Denies fever, chills, chest pain, and abdominal pain.           Review of patient's allergies indicates:   Allergen Reactions    Benazepril Swelling     Past Medical History:   Diagnosis Date    Asthma     COPD (chronic obstructive pulmonary disease)     Hypertension     Seizures      History reviewed. No pertinent surgical history.  Family History   Problem Relation Age of Onset    Cancer Father     Hypertension Sister     Stroke Sister     Stroke Brother     Diabetes Neg Hx     Heart disease Neg Hx      Social History     Tobacco Use    Smoking status: Current Every Day Smoker     Packs/day: 0.25     Types: Cigarettes    Smokeless tobacco: Never Used   Substance Use Topics    Alcohol use: No    Drug use: No     Review of Systems   Constitutional: Negative for chills and fever.   HENT: Negative for congestion.    Eyes: Negative for  visual disturbance.   Respiratory: Positive for cough. Negative for shortness of breath.    Cardiovascular: Negative for chest pain and leg swelling.   Gastrointestinal: Negative for abdominal pain, nausea and vomiting.   Genitourinary: Negative for difficulty urinating and hematuria.   Skin: Negative for rash.   Neurological: Negative for dizziness and headaches.       Physical Exam     Initial Vitals [09/20/18 1041]   BP Pulse Resp Temp SpO2   (!) 174/88 88 17 -- --      MAP       --         Physical Exam    Constitutional: He appears well-developed and well-nourished. He does not appear ill. No distress.   HENT:   Head: Normocephalic and atraumatic.   Mouth/Throat: Oropharynx is clear and moist.   Eyes: EOM are normal.   Neck: Normal range of motion. Neck supple.   Cardiovascular: Normal rate, regular rhythm and normal heart sounds.   Pulmonary/Chest: Effort normal. No accessory muscle usage. No respiratory distress. He has wheezes (scattered bilaterally). He has rhonchi.   Abdominal: Soft. Bowel sounds are normal. There is no tenderness.   Musculoskeletal: Normal range of motion.        Right lower leg: He exhibits no edema.        Left lower leg: He exhibits no edema.   Neurological: He is alert and oriented to person, place, and time.   Skin: Skin is warm and dry.   Psychiatric: He has a normal mood and affect. His behavior is normal.         ED Course   Procedures  Labs Reviewed   MAGNESIUM   PHOSPHORUS   CBC W/ AUTO DIFFERENTIAL   COMPREHENSIVE METABOLIC PANEL   B-TYPE NATRIURETIC PEPTIDE   TROPONIN I          Imaging Results    None          Medical Decision Making:   Initial Assessment:   59 yo M w/ PMH of COPD, asthma and current smoker who presents following an episode of acute REES, difficulty catching his breath. EMS treated w/ duonebs, solumedrol; symptoms improved.  Differential Diagnosis:   DDx includes but is not limited to asthma exacerbation vs COPD exacerbation. Denies sxs of possible underlying  infectious; no change in sputum quality. Labs pending. Imaging pending.   Clinical Tests:   Lab Tests: Ordered and Reviewed  Radiological Study: Ordered and Reviewed  ED Management:  Duonebs. O2 supplementation. Ambulated w/o difficulty or desat; O2 remained in 90s. Patient insisted on leaving hospital prior to completion of all labs. Advised to wait, but patient opted to go home. Discharged w/ plans for close PCP follow up and 5 days of prednisone 40 mg.                       Clinical Impression:   The encounter diagnosis was Shortness of breath.                             Ana Ramirez MD  Resident  09/22/18 4298

## 2018-09-20 NOTE — ED NOTES
Pt's first and last name and birthday confirmed.   LOC: The patient is awake, alert and aware of environment with an appropriate affect, the patient is oriented x 3 and speaking appropriately.  APPEARANCE: Patient resting comfortably and in no acute distress, patient is clean and well groomed.  SKIN: The skin is warm and dry, patient has normal skin turgor and moist mucus membranes, skin intact, no breakdown or brusing noted.  MUSKULOSKELETAL: Patient moving all extremities well, no obvious swelling or deformities noted.  RESPIRATORY: Airway is open and patent, respirations are spontaneous, patient has a normal effort and rate. Breath sounds are coarse bilaterally.   CARDIAC: Normal heart sounds. No peripheral edema.  ABDOMEN: Soft and non tender to palpation, no distention noted. Bowel sounds present.   NEURO: No neuro deficits, hand grasp equal, no drift noted, no facial droop noted. Speech is clear.

## 2018-10-03 ENCOUNTER — HOSPITAL ENCOUNTER (OUTPATIENT)
Facility: HOSPITAL | Age: 58
Discharge: HOME | DRG: 189 | End: 2018-10-04
Attending: EMERGENCY MEDICINE | Admitting: HOSPITALIST
Payer: MEDICAID

## 2018-10-03 DIAGNOSIS — J96.11 CHRONIC RESPIRATORY FAILURE WITH HYPOXIA AND HYPERCAPNIA: ICD-10-CM

## 2018-10-03 DIAGNOSIS — J96.12 CHRONIC RESPIRATORY FAILURE WITH HYPOXIA AND HYPERCAPNIA: ICD-10-CM

## 2018-10-03 DIAGNOSIS — R91.1 LUNG NODULE: ICD-10-CM

## 2018-10-03 DIAGNOSIS — Z72.0 TOBACCO ABUSE: ICD-10-CM

## 2018-10-03 DIAGNOSIS — R09.02 HYPOXIA: ICD-10-CM

## 2018-10-03 DIAGNOSIS — J44.1 COPD EXACERBATION: Primary | ICD-10-CM

## 2018-10-03 DIAGNOSIS — R06.02 SHORTNESS OF BREATH: ICD-10-CM

## 2018-10-03 PROBLEM — Z86.69 HX OF SEIZURE DISORDER: Chronic | Status: ACTIVE | Noted: 2017-11-20

## 2018-10-03 PROBLEM — J96.01 ACUTE HYPOXEMIC RESPIRATORY FAILURE: Status: ACTIVE | Noted: 2018-10-03

## 2018-10-03 LAB
ALBUMIN SERPL BCP-MCNC: 3.5 G/DL
ALP SERPL-CCNC: 108 U/L
ALT SERPL W/O P-5'-P-CCNC: 38 U/L
ANION GAP SERPL CALC-SCNC: 12 MMOL/L
AST SERPL-CCNC: 60 U/L
BASOPHILS # BLD AUTO: 0.07 K/UL
BASOPHILS NFR BLD: 1.2 %
BILIRUB SERPL-MCNC: 0.3 MG/DL
BNP SERPL-MCNC: 84 PG/ML
BUN SERPL-MCNC: 7 MG/DL
CALCIUM SERPL-MCNC: 8.8 MG/DL
CHLORIDE SERPL-SCNC: 103 MMOL/L
CO2 SERPL-SCNC: 26 MMOL/L
CREAT SERPL-MCNC: 0.8 MG/DL
DIFFERENTIAL METHOD: ABNORMAL
EOSINOPHIL # BLD AUTO: 0.2 K/UL
EOSINOPHIL NFR BLD: 3.6 %
ERYTHROCYTE [DISTWIDTH] IN BLOOD BY AUTOMATED COUNT: 16.9 %
EST. GFR  (AFRICAN AMERICAN): >60 ML/MIN/1.73 M^2
EST. GFR  (NON AFRICAN AMERICAN): >60 ML/MIN/1.73 M^2
GLUCOSE SERPL-MCNC: 67 MG/DL
HCT VFR BLD AUTO: 47.6 %
HGB BLD-MCNC: 14.6 G/DL
IMM GRANULOCYTES # BLD AUTO: 0.02 K/UL
IMM GRANULOCYTES NFR BLD AUTO: 0.4 %
LYMPHOCYTES # BLD AUTO: 2.2 K/UL
LYMPHOCYTES NFR BLD: 38.3 %
MCH RBC QN AUTO: 28 PG
MCHC RBC AUTO-ENTMCNC: 30.7 G/DL
MCV RBC AUTO: 91 FL
MONOCYTES # BLD AUTO: 0.6 K/UL
MONOCYTES NFR BLD: 10.5 %
NEUTROPHILS # BLD AUTO: 2.6 K/UL
NEUTROPHILS NFR BLD: 46 %
NRBC BLD-RTO: 0 /100 WBC
PLATELET # BLD AUTO: 256 K/UL
PMV BLD AUTO: 10.3 FL
POTASSIUM SERPL-SCNC: 3.9 MMOL/L
PROT SERPL-MCNC: 7.8 G/DL
RBC # BLD AUTO: 5.22 M/UL
SODIUM SERPL-SCNC: 141 MMOL/L
TROPONIN I SERPL DL<=0.01 NG/ML-MCNC: 0.02 NG/ML
WBC # BLD AUTO: 5.62 K/UL

## 2018-10-03 PROCEDURE — 25000003 PHARM REV CODE 250: Performed by: HOSPITALIST

## 2018-10-03 PROCEDURE — 99285 EMERGENCY DEPT VISIT HI MDM: CPT | Mod: ,,, | Performed by: PHYSICIAN ASSISTANT

## 2018-10-03 PROCEDURE — 25000003 PHARM REV CODE 250: Performed by: PHYSICIAN ASSISTANT

## 2018-10-03 PROCEDURE — 85025 COMPLETE CBC W/AUTO DIFF WBC: CPT

## 2018-10-03 PROCEDURE — G0378 HOSPITAL OBSERVATION PER HR: HCPCS

## 2018-10-03 PROCEDURE — 83880 ASSAY OF NATRIURETIC PEPTIDE: CPT

## 2018-10-03 PROCEDURE — 84484 ASSAY OF TROPONIN QUANT: CPT

## 2018-10-03 PROCEDURE — 25000242 PHARM REV CODE 250 ALT 637 W/ HCPCS: Performed by: HOSPITALIST

## 2018-10-03 PROCEDURE — 94761 N-INVAS EAR/PLS OXIMETRY MLT: CPT

## 2018-10-03 PROCEDURE — 63600175 PHARM REV CODE 636 W HCPCS: Performed by: PHYSICIAN ASSISTANT

## 2018-10-03 PROCEDURE — 93010 ELECTROCARDIOGRAM REPORT: CPT | Mod: ,,, | Performed by: INTERNAL MEDICINE

## 2018-10-03 PROCEDURE — 99285 EMERGENCY DEPT VISIT HI MDM: CPT | Mod: 25

## 2018-10-03 PROCEDURE — 93005 ELECTROCARDIOGRAM TRACING: CPT

## 2018-10-03 PROCEDURE — 80053 COMPREHEN METABOLIC PANEL: CPT

## 2018-10-03 PROCEDURE — 99222 1ST HOSP IP/OBS MODERATE 55: CPT | Mod: ,,, | Performed by: HOSPITALIST

## 2018-10-03 PROCEDURE — 25000242 PHARM REV CODE 250 ALT 637 W/ HCPCS: Performed by: PHYSICIAN ASSISTANT

## 2018-10-03 PROCEDURE — 94640 AIRWAY INHALATION TREATMENT: CPT

## 2018-10-03 PROCEDURE — 27000221 HC OXYGEN, UP TO 24 HOURS

## 2018-10-03 PROCEDURE — S4991 NICOTINE PATCH NONLEGEND: HCPCS | Performed by: HOSPITALIST

## 2018-10-03 PROCEDURE — 11000001 HC ACUTE MED/SURG PRIVATE ROOM

## 2018-10-03 RX ORDER — IPRATROPIUM BROMIDE AND ALBUTEROL SULFATE 2.5; .5 MG/3ML; MG/3ML
3 SOLUTION RESPIRATORY (INHALATION) EVERY 4 HOURS
Status: DISCONTINUED | OUTPATIENT
Start: 2018-10-03 | End: 2018-10-03

## 2018-10-03 RX ORDER — AZITHROMYCIN 250 MG/1
500 TABLET, FILM COATED ORAL ONCE
Status: COMPLETED | OUTPATIENT
Start: 2018-10-03 | End: 2018-10-03

## 2018-10-03 RX ORDER — ONDANSETRON 8 MG/1
8 TABLET, ORALLY DISINTEGRATING ORAL EVERY 8 HOURS PRN
Status: DISCONTINUED | OUTPATIENT
Start: 2018-10-03 | End: 2018-10-04 | Stop reason: HOSPADM

## 2018-10-03 RX ORDER — ALBUTEROL SULFATE 0.83 MG/ML
2.5 SOLUTION RESPIRATORY (INHALATION)
Status: COMPLETED | OUTPATIENT
Start: 2018-10-03 | End: 2018-10-03

## 2018-10-03 RX ORDER — IBUPROFEN 200 MG
1 TABLET ORAL DAILY
Status: DISCONTINUED | OUTPATIENT
Start: 2018-10-03 | End: 2018-10-04 | Stop reason: HOSPADM

## 2018-10-03 RX ORDER — PREDNISONE 20 MG/1
40 TABLET ORAL DAILY
Status: DISCONTINUED | OUTPATIENT
Start: 2018-10-04 | End: 2018-10-04 | Stop reason: HOSPADM

## 2018-10-03 RX ORDER — RAMELTEON 8 MG/1
8 TABLET ORAL NIGHTLY PRN
Status: DISCONTINUED | OUTPATIENT
Start: 2018-10-03 | End: 2018-10-04 | Stop reason: HOSPADM

## 2018-10-03 RX ORDER — FLUTICASONE FUROATE AND VILANTEROL 100; 25 UG/1; UG/1
1 POWDER RESPIRATORY (INHALATION) DAILY
Status: DISCONTINUED | OUTPATIENT
Start: 2018-10-03 | End: 2018-10-04 | Stop reason: HOSPADM

## 2018-10-03 RX ORDER — LEVALBUTEROL INHALATION SOLUTION 0.63 MG/3ML
0.63 SOLUTION RESPIRATORY (INHALATION) EVERY 8 HOURS
Status: DISCONTINUED | OUTPATIENT
Start: 2018-10-03 | End: 2018-10-04 | Stop reason: HOSPADM

## 2018-10-03 RX ORDER — ONDANSETRON 2 MG/ML
4 INJECTION INTRAMUSCULAR; INTRAVENOUS EVERY 12 HOURS PRN
Status: DISCONTINUED | OUTPATIENT
Start: 2018-10-03 | End: 2018-10-04 | Stop reason: HOSPADM

## 2018-10-03 RX ORDER — AMLODIPINE BESYLATE 5 MG/1
5 TABLET ORAL DAILY
Status: DISCONTINUED | OUTPATIENT
Start: 2018-10-03 | End: 2018-10-04

## 2018-10-03 RX ORDER — CALCIUM CARBONATE 200(500)MG
500 TABLET,CHEWABLE ORAL 3 TIMES DAILY PRN
Status: DISCONTINUED | OUTPATIENT
Start: 2018-10-03 | End: 2018-10-04 | Stop reason: HOSPADM

## 2018-10-03 RX ORDER — ACETAMINOPHEN 325 MG/1
650 TABLET ORAL EVERY 8 HOURS PRN
Status: DISCONTINUED | OUTPATIENT
Start: 2018-10-03 | End: 2018-10-04 | Stop reason: HOSPADM

## 2018-10-03 RX ORDER — TIOTROPIUM BROMIDE 18 UG/1
1 CAPSULE ORAL; RESPIRATORY (INHALATION) DAILY
Status: DISCONTINUED | OUTPATIENT
Start: 2018-10-03 | End: 2018-10-04 | Stop reason: HOSPADM

## 2018-10-03 RX ORDER — AMLODIPINE BESYLATE 5 MG/1
5 TABLET ORAL
Status: COMPLETED | OUTPATIENT
Start: 2018-10-03 | End: 2018-10-03

## 2018-10-03 RX ORDER — ENOXAPARIN SODIUM 100 MG/ML
40 INJECTION SUBCUTANEOUS EVERY 24 HOURS
Status: DISCONTINUED | OUTPATIENT
Start: 2018-10-03 | End: 2018-10-04 | Stop reason: HOSPADM

## 2018-10-03 RX ORDER — AZITHROMYCIN 250 MG/1
250 TABLET, FILM COATED ORAL DAILY
Status: DISCONTINUED | OUTPATIENT
Start: 2018-10-04 | End: 2018-10-04 | Stop reason: HOSPADM

## 2018-10-03 RX ORDER — PREDNISONE 20 MG/1
60 TABLET ORAL
Status: COMPLETED | OUTPATIENT
Start: 2018-10-03 | End: 2018-10-03

## 2018-10-03 RX ORDER — ALBUTEROL SULFATE 90 UG/1
2 AEROSOL, METERED RESPIRATORY (INHALATION)
Status: COMPLETED | OUTPATIENT
Start: 2018-10-03 | End: 2018-10-03

## 2018-10-03 RX ORDER — AMOXICILLIN 250 MG
1 CAPSULE ORAL 2 TIMES DAILY
Status: DISCONTINUED | OUTPATIENT
Start: 2018-10-03 | End: 2018-10-04 | Stop reason: HOSPADM

## 2018-10-03 RX ADMIN — ALBUTEROL SULFATE 2 PUFF: 90 AEROSOL, METERED RESPIRATORY (INHALATION) at 07:10

## 2018-10-03 RX ADMIN — CALCIUM CARBONATE (ANTACID) CHEW TAB 500 MG 500 MG: 500 CHEW TAB at 08:10

## 2018-10-03 RX ADMIN — FLUTICASONE FUROATE AND VILANTEROL TRIFENATATE 1 PUFF: 100; 25 POWDER RESPIRATORY (INHALATION) at 04:10

## 2018-10-03 RX ADMIN — LEVALBUTEROL HYDROCHLORIDE 0.63 MG: 0.63 SOLUTION RESPIRATORY (INHALATION) at 06:10

## 2018-10-03 RX ADMIN — NICOTINE 1 PATCH: 14 PATCH, EXTENDED RELEASE TRANSDERMAL at 04:10

## 2018-10-03 RX ADMIN — ALBUTEROL SULFATE 2.5 MG: 2.5 SOLUTION RESPIRATORY (INHALATION) at 08:10

## 2018-10-03 RX ADMIN — AMLODIPINE BESYLATE 5 MG: 5 TABLET ORAL at 07:10

## 2018-10-03 RX ADMIN — AZITHROMYCIN MONOHYDRATE 500 MG: 250 TABLET ORAL at 04:10

## 2018-10-03 RX ADMIN — PREDNISONE 60 MG: 20 TABLET ORAL at 09:10

## 2018-10-03 RX ADMIN — TIOTROPIUM BROMIDE 18 MCG: 18 CAPSULE ORAL; RESPIRATORY (INHALATION) at 04:10

## 2018-10-03 RX ADMIN — AMLODIPINE BESYLATE 5 MG: 5 TABLET ORAL at 04:10

## 2018-10-03 NOTE — NURSING
Pt arrived to floor into room 925-B from ED.  Pt alert and oriented and oriented.  IM-A paged to notify of arrival.

## 2018-10-03 NOTE — PLAN OF CARE
[facility]  Pt says he has [employer] so Cm will send msg to admit to update pt's insurance info.  Pt has home oxygen but not portable.  Pt uses bus transport    Address: [address] [place] [address]  ? Closes 8PM  Phone: [phone]    Appointment made on  , October 11TH AT  12:00P THURSDAY      10/03/18 1432   Discharge Assessment   Assessment Type Discharge Planning Assessment   Confirmed/corrected address and phone number on facesheet? Yes   Assessment information obtained from? Patient   Expected Length of Stay (days) 2   Communicated expected length of stay with patient/caregiver yes   Prior to hospitilization cognitive status: Alert/Oriented   Prior to hospitalization functional status: Independent   Current cognitive status: Alert/Oriented   Current Functional Status: Independent   Lives With other (see comments)   Able to Return to Prior Arrangements yes   Is patient able to care for self after discharge? Yes   Who are your caregiver(s) and their phone number(s)? lives w roommates     sister duncan         Patient's perception of discharge disposition home or selfcare   Readmission Within The Last 30 Days no previous admission in last 30 days   Patient currently being followed by outpatient case management? No   Patient currently receives any other outside agency services? No   Equipment Currently Used at Home oxygen   Do you have any problems affording any of your prescribed medications? No   Is the patient taking medications as prescribed? yes   Does the patient have transportation home? Yes   Transportation Available public transportation   Does the patient receive services at the [facility]? No   Discharge Plan A Home   Discharge Plan B Home   Patient/Family In Agreement With Plan yes

## 2018-10-03 NOTE — H&P
Hospital Medicine  History and Physical Exam    Patient Name; Baltazar Mccray  MRN: [id number]  Team: [facility] MED A Massimo Lewis MD  Admit Date: 10/3/2018  RUTH 10/4/2018  Principal Problem:  COPD exacerbation   Patient information was obtained from patient, past medical records and ER records.   Primary care Physician: [facility]  Code status: Full Code      HPI: This is a 58 year old male with  HTN, asthma, COPD (on home oxygen at night), seizure disorder (not on med) admitted for shortness of breath. Patient reports he was at work this morning when he began with shortness of breath characteristic of his typical asthma attacks. He took his proair rescue inhaler with resolution of his symptoms. He had activated EMS at that time and was transported for further evaluation. Patient asymptomatic without labored breathing en route. No medications were given en route. Patient with admission in August for COPD exacerbation - completed oral steroids and azithromycin. He had resolution of his symptoms after that episode. He has symbicort and Breo listed on his med card but has not been taking these. He also reports he has not taken his BP meds. Denies fever, chills, chest pain, cough, URI symptoms, peripheral edema.       Past Medical History: Patient has a past medical history of Asthma, COPD (chronic obstructive pulmonary disease), Hypertension, and Seizures.    Past Surgical History: Patient has no past surgical history on file.    Social History: Patient reports that he has been smoking cigarettes.  He has been smoking about 0.25 packs per day. he has never used smokeless tobacco. He reports that he does not drink alcohol or use drugs.    Family History: family history includes Cancer in his father; Hypertension in his sister; Stroke in his brother and sister.    Medications:   No current facility-administered medications on file prior to encounter.      Current Outpatient Medications on File Prior to  Encounter   Medication Sig Dispense Refill    albuterol (PROVENTIL) 2.5 mg /3 mL (0.083 %) nebulizer solution Take 3 mLs (2.5 mg total) by nebulization every 4 (four) hours as needed (shortness of breath). Rescue 1 Box 11    albuterol 90 mcg/actuation inhaler Inhale 2 puffs into the lungs every 6 (six) hours as needed for Wheezing. 1 each 3    albuterol-ipratropium (DUO-NEB) 2.5 mg-0.5 mg/3 mL nebulizer solution Take 3 mLs by nebulization every 4 (four) hours. Rescue 1 Box 0    amLODIPine (NORVASC) 5 MG tablet Take 1 tablet (5 mg total) by mouth once daily. 30 tablet 11    azithromycin (Z-DIEGO) 250 MG tablet Take 1 tablet (250 mg total) by mouth once daily. 2 tablet 0    benzonatate (TESSALON) 200 MG capsule Take 1 capsule (200 mg total) by mouth 3 (three) times daily as needed for Cough. 60 capsule 0    fluticasone-vilanterol (BREO ELLIPTA) 100-25 mcg/dose diskus inhaler Inhale 1 puff into the lungs once daily. Controller 14 each 1    fluticasone-vilanterol (BREO ELLIPTA) 100-25 mcg/dose diskus inhaler Inhale 1 puff into the lungs once daily. Controller 14 each 11    ibuprofen (ADVIL,MOTRIN) 800 MG tablet Take 1 tablet (800 mg total) by mouth every 6 (six) hours as needed for Pain. 30 tablet 0    ipratropium-albuterol (COMBIVENT)  mcg/actuation inhaler Inhale 1 puff into the lungs 4 (four) times daily. Rescue 4 g 0    omeprazole (PRILOSEC) 20 MG capsule Take 1 capsule (20 mg total) by mouth once daily. 30 capsule 1    tiotropium (SPIRIVA) 18 mcg inhalation capsule Inhale 1 capsule (18 mcg total) into the lungs once daily. Controller 30 capsule 11       Allergies: Patient is allergic to benazepril and duoneb [ipratropium-albuterol].    Review of Systems   Constitutional: Negative for chills, diaphoresis, fever and weight loss.   HENT: Negative for congestion and tinnitus.    Eyes: Negative for pain and redness.   Respiratory: Positive for shortness of breath. Negative for hemoptysis, sputum  production and wheezing.    Cardiovascular: Negative for chest pain, palpitations, claudication and leg swelling.   Gastrointestinal: Negative for abdominal pain, constipation and diarrhea.   Genitourinary: Negative for dysuria, frequency, hematuria and urgency.   Musculoskeletal: Negative for back pain, joint pain, myalgias and neck pain.   Neurological: Negative for dizziness, tingling, sensory change, speech change and headaches.   Psychiatric/Behavioral: Negative for depression, hallucinations, substance abuse and suicidal ideas. The patient is not nervous/anxious.        Physical Exam:  Temp:  [98.1 °F (36.7 °C)]   Pulse:  [64-89]   Resp:  [16-22]   BP: (152-177)/()   SpO2:  [86 %-100 %]   Body mass index is 20.85 kg/m².     Physical Exam  Constitutional: He appears well-developed and well-nourished. No distress. On 2L NC  HENT:   Head: Atraumatic.   Mouth/Throat: Uvula is midline and oropharynx is clear and moist.   Eyes: Conjunctivae and EOM are normal. Pupils are equal, round, and reactive to light.   Cardiovascular: Normal rate, regular rhythm and normal heart sounds.   Pulmonary/Chest: Breath sounds normal. No respiratory distress. He has no decreased breath sounds. He has no wheezes. He has no rhonchi. He has no rales.   Abdominal: Soft. Bowel sounds are normal. There is no tenderness.   Neurological: He is alert and oriented to person, place, and time.   Skin: Skin is warm and dry. No rash noted.      No intake or output data in the 24 hours ending 10/03/18 1646    Significant Labs and Imaging:    Recent Labs   Lab  10/03/18   0834   WBC  5.62   HGB  14.6   HCT  47.6   PLT  256     Recent Labs   Lab  10/03/18   0834   NA  141   K  3.9   CL  103   CO2  26   BUN  7   CREATININE  0.8   GLU  67*   CALCIUM  8.8   ALKPHOS  108   ALT  38   AST  60*   ALBUMIN  3.5   PROT  7.8   BILITOT  0.3       Inpatient Medications prescribed for management of current Problems:   Scheduled Meds:    amLODIPine  5 mg  Oral Daily    [START ON 10/4/2018] azithromycin  250 mg Oral Daily    enoxaparin  40 mg Subcutaneous Daily    fluticasone-vilanterol  1 puff Inhalation Daily    levalbuterol  0.63 mg Nebulization Q8H    nicotine  1 patch Transdermal Daily    [START ON 10/4/2018] predniSONE  40 mg Oral Daily    senna-docusate 8.6-50 mg  1 tablet Oral BID    tiotropium  1 capsule Inhalation Daily     Continuous Infusions:   As Needed: acetaminophen, ondansetron, ondansetron, ramelteon    Active Hospital Problems    Diagnosis  POA    *COPD exacerbation [J44.1]  Yes    Acute hypoxemic respiratory failure [J96.01]  Yes    Tobacco abuse [Z72.0]  Yes    Chronic respiratory failure with hypoxia and hypercapnia [J96.11, J96.12]  Yes    Essential hypertension [I10]  Yes      Resolved Hospital Problems   No resolved problems to display.       Overview:  Admitted to hospital medicine on 10/3/2018 with COPD exacerbation        Assessment and Plan for Problems addressed today:    *COPD exacerbation   Acute hypoxemic respiratory failure   Tobacco abuse  - Admitted 10/3/2018 for shortness of breath. In ED  O2 sat dipped to 77%  - CXR is clear. Patient given nebs and PO steroids. Place on supplemental oxygen with improvement  - COPD pathway started  - PO steroid, (Neb Xopenex as Duo-neb causes him to have tremors), Breo, spiriva, Zpak  - Nicotine patch, pt report decrease smoking but no ready to quit  - wean down O2, goal 88-92%    Essential hypertension  - restart home meds    Hx of seizure  - 1x per patient, no longer on meds for 4 years      DVT Prophylaxis:   Anticoagulants   Medication Route Frequency    enoxaparin injection 40 mg Subcutaneous Daily       HIGH RISK CONDITION(S):   Patient has a condition that poses threat to life and bodily function: Severe Respiratory Distress     Discharge plan and follow up  Home or Self Care      Provider:  Massimo Lewis MD  [facility] [facility] MED A   Department of Hospital Medicine

## 2018-10-03 NOTE — ED PROVIDER NOTES
Encounter Date: 10/3/2018    SCRIBE #1 NOTE: I, Мария Rosas, am scribing for, and in the presence of, Dr. Ndiaye. the EKG reading.       History     Chief Complaint   Patient presents with    Shortness of Breath     This is a 58 year old male with a PMH of HTN, asthma, COPD (on home oxygen at night), seizure disorder who presents to the ED with a chief complaint of shortness of breath. Patient reports he was at work this morning when he began with shortness of breath characteristic of his typical asthma attacks. He took his proair rescue inhaler with resolution of his symptoms. He had activated EMS at that time and was transported for further evaluation. Patient asymptomatic without labored breathing en route. No medications were given en route. Patient with admission in August for COPD exacerbation - completed oral steroids and azithromycin. He had resolution of his symptoms after that episode. He has symbicort and Breo listed on his med card but has not been taking these. He also reports he has not taken his BP meds. Denies fever, chills, chest pain, cough, URI symptoms, peripheral edema.          Review of patient's allergies indicates:   Allergen Reactions    Benazepril Swelling     Past Medical History:   Diagnosis Date    Asthma     COPD (chronic obstructive pulmonary disease)     Hypertension     Seizures      History reviewed. No pertinent surgical history.  Family History   Problem Relation Age of Onset    Cancer Father     Hypertension Sister     Stroke Sister     Stroke Brother     Diabetes Neg Hx     Heart disease Neg Hx      Social History     Tobacco Use    Smoking status: Current Every Day Smoker     Packs/day: 0.25     Types: Cigarettes    Smokeless tobacco: Never Used   Substance Use Topics    Alcohol use: No    Drug use: No     Review of Systems   Constitutional: Negative for fever.   HENT: Negative for sore throat.    Respiratory: Positive for shortness of breath. Negative  for cough, chest tightness and wheezing.    Cardiovascular: Negative for chest pain.   Gastrointestinal: Negative for nausea and vomiting.   Genitourinary: Negative for dysuria.   Musculoskeletal: Negative for back pain.   Skin: Negative for rash.   Neurological: Negative for weakness.   Hematological: Does not bruise/bleed easily.       Physical Exam     Initial Vitals [10/03/18 0707]   BP Pulse Resp Temp SpO2   (!) 170/100 77 18 98.1 °F (36.7 °C) 96 %      MAP       --         Physical Exam    Constitutional: He appears well-developed and well-nourished. No distress.   HENT:   Head: Atraumatic.   Mouth/Throat: Uvula is midline and oropharynx is clear and moist.   Eyes: Conjunctivae and EOM are normal. Pupils are equal, round, and reactive to light.   Cardiovascular: Normal rate, regular rhythm and normal heart sounds.   Pulmonary/Chest: Breath sounds normal. No respiratory distress. He has no decreased breath sounds. He has no wheezes. He has no rhonchi. He has no rales.   Abdominal: Soft. Bowel sounds are normal. There is no tenderness.   Neurological: He is alert and oriented to person, place, and time.   Skin: Skin is warm and dry. No rash noted.         ED Course   Procedures  Labs Reviewed   CBC W/ AUTO DIFFERENTIAL - Abnormal; Notable for the following components:       Result Value    MCHC 30.7 (*)     RDW 16.9 (*)     All other components within normal limits   COMPREHENSIVE METABOLIC PANEL - Abnormal; Notable for the following components:    Glucose 67 (*)     AST 60 (*)     All other components within normal limits   TROPONIN I   B-TYPE NATRIURETIC PEPTIDE     EKG Readings: (Independently Interpreted)   Initial Reading: No STEMI.   Normal sinus rhythm at a rate of 79 bpm.        Imaging Results          X-Ray Chest PA And Lateral (Final result)  Result time 10/03/18 08:28:51    Final result by Naeem Choi MD (10/03/18 08:28:51)                 Impression:      See above      Electronically signed  by: Naeem Choi MD  Date:    10/03/2018  Time:    08:28             Narrative:    EXAMINATION:  XR CHEST PA AND LATERAL    CLINICAL HISTORY:  Shortness of breath    TECHNIQUE:  PA and lateral views of the chest were performed.    COMPARISON:  Non 09/20/2018 e    FINDINGS:  Heart size normal.  The lungs are clear.  No pleural effusion                                       APC / Resident Notes:   58 year old male presents with shortness of breath.  On exam he is afebrile and nontoxic. O2 88% on room air at rest. Respirations are unlabored, lungs are clear without wheezing. No peripheral edema.    DDx includes but is not limited to COPD exacerbation, pneumonia, ACS.    Patient stating his symptoms have resolved and he desires discharge.  Labs are grossly unremarkable. CXR is clear. Patient given nebs and PO steroids in the ED. He continued to remain hypoxic with trial on room air. With ambulation his O2 sat dipped to 77%. We will admit for COPD pathway given his hypoxia. Advised patient of findings and plan. He requested I contact his manager to inform them of his need for admission, which I was able to do. He is agreeable to admission. I discussed the care of this patient with my supervising MD.        Scribe Attestation:   Scribe #1: I performed the above scribed service and the documentation accurately describes the services I performed. I attest to the accuracy of the note.    Attending Attestation:     Physician Attestation Statement for NP/PA:   I discussed this assessment and plan of this patient with the NP/PA, but I did not personally examine the patient. The face to face encounter was performed by the NP/PA.        Physician Attestation for Scribe:  Physician Attestation Statement for Scribe #1: I, Dr. Ndiaye, reviewed documentation, as scribed by Мария Rosas in my presence, and it is both accurate and complete.                    Clinical Impression:   The primary encounter diagnosis was COPD  exacerbation. Diagnoses of Shortness of breath, Hypoxia, Chronic respiratory failure with hypoxia and hypercapnia, Tobacco abuse, and Lung nodule were also pertinent to this visit.      Disposition:   Disposition: Admitted  Condition: Stable                        Sommer Bauer PA-C  10/03/18 1537       Wellington Ndiaye MD  10/04/18 1940

## 2018-10-03 NOTE — ED NOTES
Ambulated around ED --low pulse ox noted. Dr. Souza Stephani aware--Breakfast served. OZZIE and ronald crackers given

## 2018-10-03 NOTE — ED NOTES
Report called to Candie on the 9th floor ----------------------------------------------------------going to 999O

## 2018-10-04 VITALS
OXYGEN SATURATION: 92 % | HEIGHT: 68 IN | HEART RATE: 72 BPM | SYSTOLIC BLOOD PRESSURE: 176 MMHG | DIASTOLIC BLOOD PRESSURE: 86 MMHG | TEMPERATURE: 98 F | BODY MASS INDEX: 20.85 KG/M2 | RESPIRATION RATE: 17 BRPM

## 2018-10-04 LAB
ALBUMIN SERPL BCP-MCNC: 3.3 G/DL
ALP SERPL-CCNC: 100 U/L
ALT SERPL W/O P-5'-P-CCNC: 33 U/L
ANION GAP SERPL CALC-SCNC: 14 MMOL/L
AST SERPL-CCNC: 41 U/L
BILIRUB SERPL-MCNC: 0.4 MG/DL
BUN SERPL-MCNC: 9 MG/DL
CALCIUM SERPL-MCNC: 9.7 MG/DL
CHLORIDE SERPL-SCNC: 99 MMOL/L
CO2 SERPL-SCNC: 25 MMOL/L
CREAT SERPL-MCNC: 0.7 MG/DL
EST. GFR  (AFRICAN AMERICAN): >60 ML/MIN/1.73 M^2
EST. GFR  (NON AFRICAN AMERICAN): >60 ML/MIN/1.73 M^2
GLUCOSE SERPL-MCNC: 78 MG/DL
MAGNESIUM SERPL-MCNC: 1.7 MG/DL
PHOSPHATE SERPL-MCNC: 2.4 MG/DL
POCT GLUCOSE: 147 MG/DL (ref 70–110)
POCT GLUCOSE: 75 MG/DL (ref 70–110)
POTASSIUM SERPL-SCNC: 3.7 MMOL/L
PROCALCITONIN SERPL IA-MCNC: 0.14 NG/ML
PROT SERPL-MCNC: 7.4 G/DL
SODIUM SERPL-SCNC: 138 MMOL/L

## 2018-10-04 PROCEDURE — 63600175 PHARM REV CODE 636 W HCPCS: Performed by: HOSPITALIST

## 2018-10-04 PROCEDURE — 25000242 PHARM REV CODE 250 ALT 637 W/ HCPCS: Performed by: HOSPITALIST

## 2018-10-04 PROCEDURE — G8988 SELF CARE GOAL STATUS: HCPCS | Mod: CH

## 2018-10-04 PROCEDURE — 84100 ASSAY OF PHOSPHORUS: CPT

## 2018-10-04 PROCEDURE — G8987 SELF CARE CURRENT STATUS: HCPCS | Mod: CH

## 2018-10-04 PROCEDURE — 25000003 PHARM REV CODE 250: Performed by: PHYSICIAN ASSISTANT

## 2018-10-04 PROCEDURE — 97165 OT EVAL LOW COMPLEX 30 MIN: CPT

## 2018-10-04 PROCEDURE — 25000003 PHARM REV CODE 250: Performed by: HOSPITALIST

## 2018-10-04 PROCEDURE — 80053 COMPREHEN METABOLIC PANEL: CPT

## 2018-10-04 PROCEDURE — 94640 AIRWAY INHALATION TREATMENT: CPT

## 2018-10-04 PROCEDURE — 27000221 HC OXYGEN, UP TO 24 HOURS

## 2018-10-04 PROCEDURE — G0378 HOSPITAL OBSERVATION PER HR: HCPCS

## 2018-10-04 PROCEDURE — 84145 PROCALCITONIN (PCT): CPT

## 2018-10-04 PROCEDURE — 25000003 PHARM REV CODE 250: Performed by: NURSE PRACTITIONER

## 2018-10-04 PROCEDURE — 83735 ASSAY OF MAGNESIUM: CPT

## 2018-10-04 PROCEDURE — S4991 NICOTINE PATCH NONLEGEND: HCPCS | Performed by: HOSPITALIST

## 2018-10-04 PROCEDURE — G8989 SELF CARE D/C STATUS: HCPCS | Mod: CH

## 2018-10-04 PROCEDURE — 36415 COLL VENOUS BLD VENIPUNCTURE: CPT

## 2018-10-04 PROCEDURE — 99239 HOSP IP/OBS DSCHRG MGMT >30: CPT | Mod: ,,, | Performed by: HOSPITALIST

## 2018-10-04 PROCEDURE — 94761 N-INVAS EAR/PLS OXIMETRY MLT: CPT

## 2018-10-04 RX ORDER — IBUPROFEN 200 MG
1 TABLET ORAL DAILY
Refills: 0 | Status: ON HOLD | COMMUNITY
Start: 2018-10-05 | End: 2019-11-30

## 2018-10-04 RX ORDER — LOSARTAN POTASSIUM AND HYDROCHLOROTHIAZIDE 12.5; 1 MG/1; MG/1
1 TABLET ORAL DAILY
Status: DISCONTINUED | OUTPATIENT
Start: 2018-10-04 | End: 2018-10-04 | Stop reason: HOSPADM

## 2018-10-04 RX ORDER — AMLODIPINE BESYLATE 10 MG/1
10 TABLET ORAL DAILY
Qty: 30 TABLET | Refills: 3 | Status: ON HOLD | OUTPATIENT
Start: 2018-10-04 | End: 2019-11-30

## 2018-10-04 RX ORDER — AMLODIPINE BESYLATE 10 MG/1
10 TABLET ORAL DAILY
Status: DISCONTINUED | OUTPATIENT
Start: 2018-10-04 | End: 2018-10-04 | Stop reason: HOSPADM

## 2018-10-04 RX ORDER — HYDRALAZINE HYDROCHLORIDE 25 MG/1
25 TABLET, FILM COATED ORAL EVERY 6 HOURS PRN
Status: DISCONTINUED | OUTPATIENT
Start: 2018-10-04 | End: 2018-10-04 | Stop reason: HOSPADM

## 2018-10-04 RX ORDER — LOSARTAN POTASSIUM AND HYDROCHLOROTHIAZIDE 12.5; 1 MG/1; MG/1
1 TABLET ORAL DAILY
Qty: 30 TABLET | Refills: 3 | Status: ON HOLD | OUTPATIENT
Start: 2018-10-05 | End: 2019-11-30

## 2018-10-04 RX ORDER — TIOTROPIUM BROMIDE 18 UG/1
1 CAPSULE ORAL; RESPIRATORY (INHALATION) DAILY
Qty: 30 CAPSULE | Refills: 3 | Status: ON HOLD | OUTPATIENT
Start: 2018-10-04 | End: 2019-11-30

## 2018-10-04 RX ORDER — ALBUTEROL SULFATE 90 UG/1
2 AEROSOL, METERED RESPIRATORY (INHALATION) EVERY 6 HOURS PRN
Qty: 1 EACH | Refills: 3 | Status: SHIPPED | OUTPATIENT
Start: 2018-10-04 | End: 2018-10-24 | Stop reason: SDUPTHER

## 2018-10-04 RX ORDER — AZITHROMYCIN 250 MG/1
250 TABLET, FILM COATED ORAL DAILY
Qty: 2 TABLET | Refills: 0 | Status: SHIPPED | OUTPATIENT
Start: 2018-10-05 | End: 2018-10-24

## 2018-10-04 RX ORDER — PREDNISONE 20 MG/1
40 TABLET ORAL DAILY
Qty: 4 TABLET | Refills: 0 | Status: SHIPPED | OUTPATIENT
Start: 2018-10-05 | End: 2018-10-07

## 2018-10-04 RX ADMIN — FLUTICASONE FUROATE AND VILANTEROL TRIFENATATE 1 PUFF: 100; 25 POWDER RESPIRATORY (INHALATION) at 09:10

## 2018-10-04 RX ADMIN — PREDNISONE 40 MG: 20 TABLET ORAL at 09:10

## 2018-10-04 RX ADMIN — AZITHROMYCIN MONOHYDRATE 250 MG: 250 TABLET ORAL at 01:10

## 2018-10-04 RX ADMIN — AMLODIPINE BESYLATE 10 MG: 10 TABLET ORAL at 09:10

## 2018-10-04 RX ADMIN — NICOTINE 1 PATCH: 14 PATCH, EXTENDED RELEASE TRANSDERMAL at 09:10

## 2018-10-04 RX ADMIN — LEVALBUTEROL HYDROCHLORIDE 0.63 MG: 0.63 SOLUTION RESPIRATORY (INHALATION) at 12:10

## 2018-10-04 RX ADMIN — TIOTROPIUM BROMIDE 18 MCG: 18 CAPSULE ORAL; RESPIRATORY (INHALATION) at 09:10

## 2018-10-04 RX ADMIN — LEVALBUTEROL HYDROCHLORIDE 0.63 MG: 0.63 SOLUTION RESPIRATORY (INHALATION) at 07:10

## 2018-10-04 RX ADMIN — CALCIUM CARBONATE (ANTACID) CHEW TAB 500 MG 500 MG: 500 CHEW TAB at 12:10

## 2018-10-04 RX ADMIN — LOSARTAN POTASSIUM AND HYDROCHLOROTHIAZIDE 1 TABLET: 12.5; 1 TABLET ORAL at 11:10

## 2018-10-04 RX ADMIN — HYDRALAZINE HYDROCHLORIDE 25 MG: 25 TABLET ORAL at 04:10

## 2018-10-04 NOTE — PLAN OF CARE
Pt says he has insurance. email to admit to update. Pt says he can pay for meds  [facility] appt made-OCT 11  AT NOON     10/04/18 7158   Final Note   Assessment Type Final Discharge Note   Discharge Disposition Home   Hospital Follow Up  Appt(s) scheduled? Yes   Discharge plans and expectations educations in teach back method with documentation complete? Yes   Right Care Referral Info   Post Acute Recommendation No Care     See AVS  No future appointments.  Pt will use public transportation to go home

## 2018-10-04 NOTE — DISCHARGE INSTRUCTIONS
[facility]    Address: [address], [place] [address]  ? Closes 8PM  Phone: [phone]      Appointment made ON October 11TH , Thursday AT 12NOON

## 2018-10-04 NOTE — PLAN OF CARE
Problem: Patient Care Overview  Goal: Plan of Care Review  Outcome: Ongoing (interventions implemented as appropriate)  Pt discharged as ordered, IV dc'd catheter intact, verbalizes instructions and Rx's, pt declined transport, no distress noted, pt escorted self out.    Problem: Chronic Obstructive Pulmonary Disease (Adult)  Goal: Signs and Symptoms of Listed Potential Problems Will be Absent, Minimized or Managed (Chronic Obstructive Pulmonary Disease)  Signs and symptoms of listed potential problems will be absent, minimized or managed by discharge/transition of care (reference Chronic Obstructive Pulmonary Disease (Adult) CPG).   10/04/18 1527   Chronic Obstructive Pulmonary Disease   Problems Assessed (Chronic Obstructive Pulmonary Disease (COPD)) all   Problems Present (Chronic Obstructive Pulmonary Disease (COPD)) situational response

## 2018-10-04 NOTE — PLAN OF CARE
Problem: Occupational Therapy Goal  Goal: Occupational Therapy Goal  Outcome: Outcome(s) achieved Date Met: 10/04/18  Pt is (I). OT to d/c.    NISH Echavarria  10/4/2018  Pager: [phone]

## 2018-10-04 NOTE — PLAN OF CARE
Problem: Physical Therapy Goal  Goal: Physical Therapy Goal  PT screen and BLANCA Lindsey, PT  10/4/2018

## 2018-10-04 NOTE — PROGRESS NOTES
Hospital Medicine  Progress Note    Patient Name: Baltazar Mccray  MRN: [id number]  Team: [facility] MED A Massimo Lewis MD  Admit Date: 10/3/2018  RUTH 10/4/2018  Code status: Full Code    Principal Problem:  COPD exacerbation    Interval history: Doing well, wean off oxygen.     ROS  Constitutional: Negative for chills, diaphoresis, fever and weight loss.   HENT: Negative for congestion and tinnitus.    Eyes: Negative for pain and redness.   Respiratory: Negative for shortness of breath, hemoptysis, sputum production and wheezing.    Cardiovascular: Negative for chest pain, palpitations, claudication and leg swelling.   Gastrointestinal: Negative for abdominal pain, constipation and diarrhea.   Genitourinary: Negative for dysuria, frequency, hematuria and urgency.   Musculoskeletal: Negative for back pain, joint pain, myalgias and neck pain.   Neurological: Negative for dizziness, tingling, sensory change, speech change and headaches.   Psychiatric/Behavioral: Negative for depression, hallucinations, substance abuse and suicidal ideas. The patient is not nervous/anxious.        Physical Exam:  Temp:  [97.8 °F (36.6 °C)-98.6 °F (37 °C)]   Pulse:  [62-91]   Resp:  [16-28]   BP: (167-192)/()   SpO2:  [92 %-95 %]    Temp: 97.8 °F (36.6 °C) (10/04/18 1150)  Pulse: 72 (10/04/18 1150)  Resp: 17 (10/04/18 0942)  BP: (!) 176/86 (10/04/18 1150)  SpO2: (!) 92 % (10/04/18 1150)    Intake/Output Summary (Last 24 hours) at 10/4/2018 1420  Last data filed at 10/4/2018 0500  Gross per 24 hour   Intake 400 ml   Output --   Net 400 ml        Body mass index is 20.85 kg/m².    Physical Exam  Constitutional: He appears well-developed and well-nourished. No distress.   HENT:   Head: Atraumatic.   Mouth/Throat: Uvula is midline and oropharynx is clear and moist.   Eyes: Conjunctivae and EOM are normal. Pupils are equal, round, and reactive to light.   Cardiovascular: Normal rate, regular rhythm and normal heart sounds.    Pulmonary/Chest: Breath sounds normal. No respiratory distress. He has no decreased breath sounds. He has no wheezes. He has no rhonchi. He has no rales.   Abdominal: Soft. Bowel sounds are normal. There is no tenderness.   Neurological: He is alert and oriented to person, place, and time.   Skin: Skin is warm and dry. No rash noted.       Significant Labs:  Recent Labs   Lab  10/03/18   0834   WBC  5.62   HGB  14.6   HCT  47.6   PLT  256     Recent Labs   Lab  10/03/18   0834  10/04/18   0412   NA  141  138   K  3.9  3.7   CL  103  99   CO2  26  25   BUN  7  9   CREATININE  0.8  0.7   GLU  67*  78   CALCIUM  8.8  9.7   MG   --   1.7   PHOS   --   2.4*   ALKPHOS  108  100   ALT  38  33   AST  60*  41*   ALBUMIN  3.5  3.3*   PROT  7.8  7.4   BILITOT  0.3  0.4     Recent Labs   Lab  10/04/18   0716  10/04/18   1216   POCTGLUCOSE  75  147*     A1C:   Recent Labs   Lab  08/12/18 2000   HGBA1C  5.1     Recent Labs   Lab  10/03/18   0834   TROPONINI  0.017        Inpatient Medications prescribed for management of current Problems:   Scheduled Meds:    amLODIPine  10 mg Oral Daily    azithromycin  250 mg Oral Daily    enoxaparin  40 mg Subcutaneous Daily    fluticasone-vilanterol  1 puff Inhalation Daily    levalbuterol  0.63 mg Nebulization Q8H    losartan-hydrochlorothiazide 100-12.5 mg  1 tablet Oral Daily    nicotine  1 patch Transdermal Daily    predniSONE  40 mg Oral Daily    senna-docusate 8.6-50 mg  1 tablet Oral BID    tiotropium  1 capsule Inhalation Daily     Continuous Infusions:   As Needed: acetaminophen, calcium carbonate, INV hydrALAZINE, ondansetron, ondansetron, ramelteon    Active Hospital Problems    Diagnosis  POA    *COPD exacerbation [J44.1]  Yes    Acute hypoxemic respiratory failure [J96.01]  Yes    Tobacco abuse [Z72.0]  Yes    Chronic respiratory failure with hypoxia and hypercapnia [J96.11, J96.12]  Yes    Essential hypertension [I10]  Yes      Resolved Hospital Problems   No  resolved problems to display.       Overview:  Admitted to hospital medicine on 10/3/2018 with COPD exacerbation     Assessment and Plan for Problems addressed today:     *COPD exacerbation/Acute hypoxemic respiratory failure/Tobacco abuse  - Admitted 10/3/2018 for shortness of breath. In ED O2 sat dipped to 77%  - CXR is clear. Patient given nebs and PO steroids. Place on supplemental oxygen with improvement  - COPD pathway started  - PO steroid, (Neb Xopenex as Duo-neb causes him to have tremors), Breo, spiriva, Zpak  - Nicotine patch, pt report decrease smoking but no ready to quit  - wean down O2, goal 88-92%, on room air  - refills meds  - encourage follow up in clinic      Essential hypertension  - restart home meds, poorly controlled  - increase Amlodipine, start Hyzaar  - encourage follow up in clinic    Hx of seizure  - 1x per patient, no longer on meds for 4 years     DVT Prophylaxis:   Anticoagulants   Medication Route Frequency    enoxaparin injection 40 mg Subcutaneous Daily       HIGH RISK CONDITION(S):   Patient has a condition that poses threat to life and bodily function: Severe Respiratory Distress      Discharge plan and follow up  Home or Self Care         Provider:  Massimo Lewis MD  [facility] [place] [facility]OSP MED A   Department of Hospital Medicine

## 2018-10-04 NOTE — PT/OT/SLP EVAL
Occupational Therapy   Evaluation and Discharge Note    Name: Baltazar Mccray  MRN: [id number]  Admitting Diagnosis:  COPD exacerbation      Recommendations:     Discharge Recommendations: home  Discharge Equipment Recommendations:  none  Barriers to discharge:  None    History:     Occupational Profile:  Living Environment: Pt lives in a house with no AGUSTIN  Previous level of function: (I), ,   Equipment Owned:  oxygen  Assistance upon Discharge: available    Past Medical History:   Diagnosis Date    Asthma     COPD (chronic obstructive pulmonary disease)     Hypertension     Seizures        History reviewed. No pertinent surgical history.    Subjective     Chief Complaint: being here  Patient/Family stated goals: go home  Communicated with: RN prior to session.  Pain/Comfort:  · Pain Rating 1: 0/10  · Pain Rating Post-Intervention 1: 0/10    Patients cultural, spiritual, Zoroastrianism conflicts given the current situation: none stated    Objective:     Patient found with: (none)    General Precautions: Standard, (standard)   Orthopedic Precautions:N/A   Braces: N/A     Occupational Performance:    Bed Mobility:    · Patient completed Supine to Sit with independence  · Patient completed Sit to Supine with independence    Functional Mobility/Transfers:  · Patient completed Sit <> Stand Transfer with independence  with  no assistive device   · Functional Mobility: (I) with no AD    Activities of Daily Living:  · Grooming: independence washing hands at sink  · Lower Body Dressing: independence pants, socks  · Toileting: independence standing at toilet    Cognitive/Visual Perceptual:  Cognitive/Psychosocial Skills:     -       Oriented to: Person, Place, Time and Situation   -       Safety awareness/insight to disability: intact   Visual/Perceptual:      -Intact      Physical Exam:  Balance:    -       Good  Postural examination/scapula alignment:    -       No postural abnormalities identified  Dominant  "hand:    -       R  Upper Extremity Range of Motion:     -       Right Upper Extremity: WNL    -       Left Upper Extremity: WNL      Upper Extremity Strength:    -       Right Upper Extremity: WNL  -       Left Upper Extremity: WNL     Strength:    -       Right Upper Extremity: WNL    -       Left Upper Extremity: WNL    Fine Motor Coordination:    -       Intact  Gross motor coordination:   WFL    Patient left supine with call button in reach    AMPAC 6 Click:  AMPAC Total Score: 24    Treatment & Education:  Pt ed re OT role and POC. Pt ed re safety.  Education:    Assessment:     Baltazar Mccray is a 58 y.o. male with a medical diagnosis of COPD exacerbation. At this time, patient is functioning at their prior level of function and does not require further acute OT services.     Clinical Decision Makin.  OT Low:  "Pt evaluation falls under low complexity for evaluation coding due to performance deficits noted in 1-3 areas as stated above and 0 co-morbities affecting current functional status. Data obtained from problem focused assessments. No modifications or assistance was required for completion of evaluation. Only brief occupational profile and history review completed."     Plan:     During this hospitalization, patient does not require further acute OT services.  Please re-consult if situation changes.    · Plan of Care Reviewed with: patient    This Plan of care has been discussed with the patient who was involved in its development and understands and is in agreement with the identified goals and treatment plan    GOALS:   Multidisciplinary Problems     Occupational Therapy Goals     Not on file          Multidisciplinary Problems (Resolved)        Problem: Occupational Therapy Goal    Goal Priority Disciplines Outcome Interventions   Occupational Therapy Goal   (Resolved)     OT, PT/OT Outcome(s) achieved                    Time Tracking:     OT Date of Treatment: 10/04/18  OT Start Time: " 1341  OT Stop Time: 1353  OT Total Time (min): 12 min    Billable Minutes:Evaluation 12 minutes    NISH Echavarria  10/4/2018  Pager: [phone]

## 2018-10-04 NOTE — PT/OT/SLP PROGRESS
Physical Therapy Screen and D/C      Patient Name:  Baltazar Mccray   MRN:  [id number]    Pt is a 59yo male referred to PT 10/3/18 for COPD exacerbation. Pt screened at bedside and determined to be performing functional mobility independently at this time. Pt has no acute PT needs and will be discharged; please re-consult if change in functional status. Thank you.    Stephanie Lindsey, PT

## 2018-10-05 ENCOUNTER — OUTPATIENT CASE MANAGEMENT (OUTPATIENT)
Dept: ADMINISTRATIVE | Facility: OTHER | Age: 58
End: 2018-10-05

## 2018-10-05 NOTE — PROGRESS NOTES
Thank you for the referral.  Patient has been assigned to Linda Hsieh LMSW for low risk screening for Outpatient Case Management.     Reason for referral:  COPD exacerbation  Chronic respiratory failure with hypoxia and hypercapnia  Tobacco abuse  Lung nodule    Outpatient Complex Care Management Criteria: Lace+ score 65 or >; readmit w/in 30 days; newly diagnosed COPD; stage III or IV COPD (if stage unknown: requirement for long-term oxygen therapy, development of cor pulmonale, or clinician expectation of death in the next 12 months); new to oxygen; unable to afford medications    Patient is uninsured.    Please contact [facility] at [phone] with any questions.    Viktoriya Calero    [facility]

## 2018-10-08 ENCOUNTER — OUTPATIENT CASE MANAGEMENT (OUTPATIENT)
Dept: ADMINISTRATIVE | Facility: OTHER | Age: 58
End: 2018-10-08

## 2018-10-09 ENCOUNTER — OUTPATIENT CASE MANAGEMENT (OUTPATIENT)
Dept: ADMINISTRATIVE | Facility: OTHER | Age: 58
End: 2018-10-09

## 2018-10-09 NOTE — PROGRESS NOTES
2nd attempt     This LMSW attempted to reach patient/caregiver to provide resource and left msg requesting a return call.  Letter with contact information was sent via US Mail  to patient/caregiver.  Referral source notified.

## 2018-10-09 NOTE — LETTER
October 9, 2018    Baltazar Mccray  [address] [facility] [place] [address]             [facility]  [address] [facility] [address] Dear: Baltazar Mccray     I am writing from the Outpatient Complex Care Management Department at [facility].  I received a referral from Dr. Debbie Keys  to contact you or your caregiver regarding any needs you may have. I have attempted to contact you or your cargeiver by phone two times unsuccessfully.  Please contact the Outpatient Complex Care Management Department at [phone] if you would like to discuss your needs.      Sincerely,         Linda Hsieh LMSW

## 2018-10-24 ENCOUNTER — HOSPITAL ENCOUNTER (EMERGENCY)
Facility: HOSPITAL | Age: 58
Discharge: HOME | End: 2018-10-24
Attending: EMERGENCY MEDICINE
Payer: MEDICAID

## 2018-10-24 VITALS
SYSTOLIC BLOOD PRESSURE: 144 MMHG | HEART RATE: 65 BPM | WEIGHT: 140 LBS | DIASTOLIC BLOOD PRESSURE: 85 MMHG | RESPIRATION RATE: 18 BRPM | TEMPERATURE: 99 F | BODY MASS INDEX: 21.29 KG/M2 | OXYGEN SATURATION: 99 %

## 2018-10-24 DIAGNOSIS — J44.1 COPD EXACERBATION: Primary | ICD-10-CM

## 2018-10-24 DIAGNOSIS — R05.9 COUGH: ICD-10-CM

## 2018-10-24 DIAGNOSIS — R06.02 SHORTNESS OF BREATH: ICD-10-CM

## 2018-10-24 LAB
ALBUMIN SERPL BCP-MCNC: 3.5 G/DL
ALP SERPL-CCNC: 109 U/L
ALT SERPL W/O P-5'-P-CCNC: 47 U/L
ANION GAP SERPL CALC-SCNC: 9 MMOL/L
AST SERPL-CCNC: 68 U/L
BASOPHILS # BLD AUTO: 0.06 K/UL
BASOPHILS NFR BLD: 1.4 %
BILIRUB SERPL-MCNC: 0.4 MG/DL
BNP SERPL-MCNC: <10 PG/ML
BUN SERPL-MCNC: 4 MG/DL
CALCIUM SERPL-MCNC: 8.9 MG/DL
CHLORIDE SERPL-SCNC: 99 MMOL/L
CO2 SERPL-SCNC: 29 MMOL/L
CREAT SERPL-MCNC: 0.8 MG/DL
DIFFERENTIAL METHOD: ABNORMAL
EOSINOPHIL # BLD AUTO: 0.2 K/UL
EOSINOPHIL NFR BLD: 3.8 %
ERYTHROCYTE [DISTWIDTH] IN BLOOD BY AUTOMATED COUNT: 16 %
EST. GFR  (AFRICAN AMERICAN): >60 ML/MIN/1.73 M^2
EST. GFR  (NON AFRICAN AMERICAN): >60 ML/MIN/1.73 M^2
GLUCOSE SERPL-MCNC: 82 MG/DL
HCT VFR BLD AUTO: 43.5 %
HGB BLD-MCNC: 13.9 G/DL
IMM GRANULOCYTES # BLD AUTO: 0.01 K/UL
IMM GRANULOCYTES NFR BLD AUTO: 0.2 %
LYMPHOCYTES # BLD AUTO: 1.7 K/UL
LYMPHOCYTES NFR BLD: 40.3 %
MCH RBC QN AUTO: 27.8 PG
MCHC RBC AUTO-ENTMCNC: 32 G/DL
MCV RBC AUTO: 87 FL
MONOCYTES # BLD AUTO: 0.5 K/UL
MONOCYTES NFR BLD: 11.4 %
NEUTROPHILS # BLD AUTO: 1.8 K/UL
NEUTROPHILS NFR BLD: 42.9 %
NRBC BLD-RTO: 0 /100 WBC
PLATELET # BLD AUTO: 173 K/UL
PMV BLD AUTO: 10.3 FL
POTASSIUM SERPL-SCNC: 3.8 MMOL/L
PROT SERPL-MCNC: 7.8 G/DL
RBC # BLD AUTO: 5 M/UL
SODIUM SERPL-SCNC: 137 MMOL/L
TROPONIN I SERPL DL<=0.01 NG/ML-MCNC: 0.02 NG/ML
WBC # BLD AUTO: 4.22 K/UL

## 2018-10-24 PROCEDURE — 80053 COMPREHEN METABOLIC PANEL: CPT

## 2018-10-24 PROCEDURE — 84484 ASSAY OF TROPONIN QUANT: CPT

## 2018-10-24 PROCEDURE — 85025 COMPLETE CBC W/AUTO DIFF WBC: CPT

## 2018-10-24 PROCEDURE — 94640 AIRWAY INHALATION TREATMENT: CPT

## 2018-10-24 PROCEDURE — 25000242 PHARM REV CODE 250 ALT 637 W/ HCPCS: Performed by: EMERGENCY MEDICINE

## 2018-10-24 PROCEDURE — 99285 EMERGENCY DEPT VISIT HI MDM: CPT | Mod: 25

## 2018-10-24 PROCEDURE — 99284 EMERGENCY DEPT VISIT MOD MDM: CPT | Mod: ,,, | Performed by: EMERGENCY MEDICINE

## 2018-10-24 PROCEDURE — 94761 N-INVAS EAR/PLS OXIMETRY MLT: CPT

## 2018-10-24 PROCEDURE — 63600175 PHARM REV CODE 636 W HCPCS: Performed by: EMERGENCY MEDICINE

## 2018-10-24 PROCEDURE — 83880 ASSAY OF NATRIURETIC PEPTIDE: CPT

## 2018-10-24 PROCEDURE — 96374 THER/PROPH/DIAG INJ IV PUSH: CPT

## 2018-10-24 PROCEDURE — 93010 ELECTROCARDIOGRAM REPORT: CPT | Mod: ,,, | Performed by: INTERNAL MEDICINE

## 2018-10-24 RX ORDER — ALBUTEROL SULFATE 90 UG/1
2 AEROSOL, METERED RESPIRATORY (INHALATION) EVERY 4 HOURS PRN
Qty: 2 EACH | Refills: 3 | Status: ON HOLD | OUTPATIENT
Start: 2018-10-24 | End: 2019-11-30

## 2018-10-24 RX ORDER — METHYLPREDNISOLONE SOD SUCC 125 MG
125 VIAL (EA) INJECTION
Status: COMPLETED | OUTPATIENT
Start: 2018-10-24 | End: 2018-10-24

## 2018-10-24 RX ORDER — DOXYCYCLINE 100 MG/1
100 CAPSULE ORAL 2 TIMES DAILY
Qty: 20 CAPSULE | Refills: 0 | Status: SHIPPED | OUTPATIENT
Start: 2018-10-24 | End: 2018-11-03

## 2018-10-24 RX ORDER — IPRATROPIUM BROMIDE AND ALBUTEROL SULFATE 2.5; .5 MG/3ML; MG/3ML
3 SOLUTION RESPIRATORY (INHALATION)
Status: COMPLETED | OUTPATIENT
Start: 2018-10-24 | End: 2018-10-24

## 2018-10-24 RX ORDER — PREDNISONE 20 MG/1
40 TABLET ORAL DAILY
Qty: 10 TABLET | Refills: 0 | Status: SHIPPED | OUTPATIENT
Start: 2018-10-24 | End: 2018-10-29

## 2018-10-24 RX ADMIN — METHYLPREDNISOLONE SODIUM SUCCINATE 125 MG: 125 INJECTION, POWDER, FOR SOLUTION INTRAMUSCULAR; INTRAVENOUS at 06:10

## 2018-10-24 RX ADMIN — IPRATROPIUM BROMIDE AND ALBUTEROL SULFATE 3 ML: .5; 3 SOLUTION RESPIRATORY (INHALATION) at 06:10

## 2018-10-24 NOTE — ED TRIAGE NOTES
Patient states SOB x 2 days, states no cough and no fever, States he has COPD.Supposed to be on 2 LPM nc. States he only wears oxygen at home not at work, came from work today.

## 2018-10-24 NOTE — DISCHARGE INSTRUCTIONS
Continue breathing treatments at home   Take anitbiotics and steroids as directed   Avoid smoking   Follow up in PCP clinic      17

## 2018-10-24 NOTE — ED NOTES
Patient identifiers verified and correct for Mr Mccray  C/C: SOB  APPEARANCE: awake and alert in NAD.  SKIN: warm, dry and intact. No breakdown or bruising.  MUSCULOSKELETAL: Patient moving all extremities spontaneously, no obvious swelling or deformities noted. Ambulates independently.  RESPIRATORY: Positive  shortness of breath.Respirations unlabored. Denies cough, denies fever, on 2 LPM nc  CARDIAC: Denies CP, 2+ distal pulses; no peripheral edema  ABDOMEN: S/ND/NT, Denies nausea  : voids spontaneously, denies difficulty  Neurologic: AAO x 4; follows commands equal strength in all extremities; denies numbness/tingling. Denies dizziness Positive gen weakness

## 2018-10-24 NOTE — ED PROVIDER NOTES
"Encounter Date: 10/24/2018    SCRIBE #1 NOTE: I, Deisi Tello, am scribing for, and in the presence of,  Dr. Patel. I have scribed the entire note.       History     Chief Complaint   Patient presents with    Shortness of Breath     C/o "i can't breathe and i have COPD."      The patient is a 58 y.o. male with co-morbidities including: HTN, seizures, asthma, and COPD, who presents to the ED with a complaint of worsening shortness of breath over the past few days. Pt reports being on home oxygen but he is unable to take it to work because it is too heavy. He states his doctor knows he does not take his oxygen to work. He denies cough and fever. He is able to ambulate. He states he has received his flu shot this year and smokes occasionally.         The history is provided by the patient.     Review of patient's allergies indicates:   Allergen Reactions    Benazepril Swelling    Duoneb [ipratropium-albuterol]      Report Tremors     Past Medical History:   Diagnosis Date    Asthma     COPD (chronic obstructive pulmonary disease)     Hypertension     Pneumonia     Seizures     SOB (shortness of breath)      History reviewed. No pertinent surgical history.  Family History   Problem Relation Age of Onset    Cancer Father     Hypertension Sister     Stroke Sister     Stroke Brother     Diabetes Neg Hx     Heart disease Neg Hx      Social History     Tobacco Use    Smoking status: Current Every Day Smoker     Packs/day: 0.25     Types: Cigarettes    Smokeless tobacco: Never Used    Tobacco comment: 1-2 cigs per day   Substance Use Topics    Alcohol use: No    Drug use: No     Review of Systems   Constitutional: Negative for fever.   HENT: Negative for sore throat.    Eyes: Negative for visual disturbance.   Respiratory: Positive for shortness of breath. Negative for cough.    Cardiovascular: Negative for chest pain.   Gastrointestinal: Negative for abdominal pain and nausea.   Genitourinary: Negative " for dysuria.   Musculoskeletal: Negative for back pain.   Skin: Negative for rash.   Neurological: Negative for weakness.       Physical Exam     Initial Vitals [10/24/18 1535]   BP Pulse Resp Temp SpO2   (!) 144/85 78 (!) 24 98.5 °F (36.9 °C) (!) 91 %      MAP       --         Physical Exam    Nursing note and vitals reviewed.  Constitutional: He appears well-developed. No distress.   HENT:   Head: Normocephalic and atraumatic.   Mouth/Throat: Oropharynx is clear and moist.   Eyes: Conjunctivae are normal.   Neck: Normal range of motion.   Cardiovascular: Normal rate, regular rhythm and normal heart sounds.   Pulmonary/Chest: No respiratory distress. He has rales (left sided).   On nasal cannula. Diminished breath sounds diffusely.   Abdominal: Soft. He exhibits no distension. There is no tenderness.   Musculoskeletal: Normal range of motion.   Neurological: He is alert and oriented to person, place, and time.   Skin: Skin is warm and dry.         ED Course   Procedures  Labs Reviewed   CBC W/ AUTO DIFFERENTIAL - Abnormal; Notable for the following components:       Result Value    Hemoglobin 13.9 (*)     RDW 16.0 (*)     All other components within normal limits   COMPREHENSIVE METABOLIC PANEL - Abnormal; Notable for the following components:    BUN, Bld 4 (*)     AST 68 (*)     ALT 47 (*)     All other components within normal limits   B-TYPE NATRIURETIC PEPTIDE   TROPONIN I     EKG Readings: (Independently Interpreted)   Initial Reading: No STEMI. Rhythm: Normal Sinus Rhythm. Heart Rate: 78. Ectopy: No Ectopy. ST Segments: Non-Specific ST Segment Depression.       Imaging Results          X-Ray Chest PA And Lateral (Final result)  Result time 10/24/18 18:44:32    Final result by Mckinley Talbert MD (10/24/18 18:44:32)                 Impression:      Pulmonary emphysema without detrimental change or radiographic acute intrathoracic process seen.  Specifically, no focal consolidation.    Electronically signed by  resident: Ghanshyam Carpenter  Date:    10/24/2018  Time:    18:22    Electronically signed by: Mckinley Talbert MD  Date:    10/24/2018  Time:    18:44             Narrative:    EXAMINATION:  XR CHEST PA AND LATERAL    CLINICAL HISTORY:  Cough    TECHNIQUE:  PA and lateral views of the chest were performed.    COMPARISON:  Chest radiograph 10/03/2018 and CT thorax 02/04/2018    FINDINGS:  Symmetric appearing nipple shadows project over both lung bases on the frontal view.The lungs are symmetrically hyperinflated with increased lucency of the upper zones consistent with underlying pulmonary emphysema.  A few scattered linear opacities consistent with subsegmental scarring versus atelectasis.  No focal consolidation, pleural effusion or pneumothorax.    The cardiac silhouette is normal in size. The hilar and mediastinal contours are unremarkable.  Calcific atherosclerosis at the aortic arch.    Bones demonstrate no acute findings.  Degenerative changes at the shoulder joint and cervical spine.                              X-Rays:   Independently Interpreted Readings:   Chest X-Ray: Normal heart size.  No acute abnormalities. Hyperexpanded, no focal consolidations      Medical Decision Making:   History:   Old Medical Records: I decided to obtain old medical records.  Initial Assessment:   Evaluation of shortness of breath. Initial concerns for COPD exacerbation, CHF, pneumonia. Symptoms did improve with supplemental oxygen. Will give steroids, nebs, get imaging and check labs.  Clinical Tests:   Lab Tests: Ordered and Reviewed  Radiological Study: Ordered and Reviewed  Medical Tests: Ordered and Reviewed  ED Management:  Symptoms improved after nebs. Labs and imaging without clinical abnormality. Will discharged with prednisone burst, atypical abx coverage and refills on albuterol inhaler.             Scribe Attestation:   Scribe #1: I performed the above scribed service and the documentation accurately describes the services  I performed. I attest to the accuracy of the note.               Clinical Impression:   The primary encounter diagnosis was COPD exacerbation. Diagnoses of Shortness of breath and Cough were also pertinent to this visit.      Disposition:   Disposition: Discharged  Condition: Stable                        Aldo Patel MD  10/24/18 3440

## 2018-10-25 ENCOUNTER — PES CALL (OUTPATIENT)
Dept: ADMINISTRATIVE | Facility: CLINIC | Age: 58
End: 2018-10-25

## 2018-11-23 PROCEDURE — 93005 ELECTROCARDIOGRAM TRACING: CPT

## 2018-11-23 PROCEDURE — 93010 ELECTROCARDIOGRAM REPORT: CPT | Mod: ,,, | Performed by: INTERNAL MEDICINE

## 2018-11-23 PROCEDURE — 99284 EMERGENCY DEPT VISIT MOD MDM: CPT | Mod: ,,, | Performed by: EMERGENCY MEDICINE

## 2018-11-23 PROCEDURE — 99285 EMERGENCY DEPT VISIT HI MDM: CPT | Mod: 25

## 2018-11-23 PROCEDURE — 93010 ELECTROCARDIOGRAM REPORT: CPT | Mod: 76,,, | Performed by: INTERNAL MEDICINE

## 2018-11-24 ENCOUNTER — HOSPITAL ENCOUNTER (EMERGENCY)
Facility: HOSPITAL | Age: 58
Discharge: HOME | End: 2018-11-24
Attending: EMERGENCY MEDICINE

## 2018-11-24 VITALS
OXYGEN SATURATION: 98 % | BODY MASS INDEX: 21.22 KG/M2 | TEMPERATURE: 98 F | DIASTOLIC BLOOD PRESSURE: 71 MMHG | RESPIRATION RATE: 20 BRPM | HEIGHT: 68 IN | HEART RATE: 82 BPM | WEIGHT: 140 LBS | SYSTOLIC BLOOD PRESSURE: 140 MMHG

## 2018-11-24 DIAGNOSIS — R06.02 SHORTNESS OF BREATH: ICD-10-CM

## 2018-11-24 DIAGNOSIS — R07.9 CHEST PAIN: ICD-10-CM

## 2018-11-24 LAB
ALBUMIN SERPL BCP-MCNC: 3.2 G/DL
ALP SERPL-CCNC: 100 U/L
ALT SERPL W/O P-5'-P-CCNC: 54 U/L
ANION GAP SERPL CALC-SCNC: 16 MMOL/L
AST SERPL-CCNC: 84 U/L
BASOPHILS # BLD AUTO: 0.08 K/UL
BASOPHILS NFR BLD: 1.6 %
BILIRUB SERPL-MCNC: 0.3 MG/DL
BNP SERPL-MCNC: 24 PG/ML
BUN SERPL-MCNC: 5 MG/DL
CALCIUM SERPL-MCNC: 8.7 MG/DL
CHLORIDE SERPL-SCNC: 103 MMOL/L
CO2 SERPL-SCNC: 23 MMOL/L
CREAT SERPL-MCNC: 0.8 MG/DL
DIFFERENTIAL METHOD: ABNORMAL
EOSINOPHIL # BLD AUTO: 0 K/UL
EOSINOPHIL NFR BLD: 0.8 %
ERYTHROCYTE [DISTWIDTH] IN BLOOD BY AUTOMATED COUNT: 15 %
EST. GFR  (AFRICAN AMERICAN): >60 ML/MIN/1.73 M^2
EST. GFR  (NON AFRICAN AMERICAN): >60 ML/MIN/1.73 M^2
GLUCOSE SERPL-MCNC: 109 MG/DL
HCT VFR BLD AUTO: 41.4 %
HGB BLD-MCNC: 13.3 G/DL
IMM GRANULOCYTES # BLD AUTO: 0.01 K/UL
IMM GRANULOCYTES NFR BLD AUTO: 0.2 %
LYMPHOCYTES # BLD AUTO: 1 K/UL
LYMPHOCYTES NFR BLD: 19.1 %
MCH RBC QN AUTO: 27.9 PG
MCHC RBC AUTO-ENTMCNC: 32.1 G/DL
MCV RBC AUTO: 87 FL
MONOCYTES # BLD AUTO: 0.1 K/UL
MONOCYTES NFR BLD: 2 %
NEUTROPHILS # BLD AUTO: 3.8 K/UL
NEUTROPHILS NFR BLD: 76.3 %
NRBC BLD-RTO: 0 /100 WBC
PLATELET # BLD AUTO: 327 K/UL
PMV BLD AUTO: 11.3 FL
POTASSIUM SERPL-SCNC: 3.7 MMOL/L
PROT SERPL-MCNC: 7.4 G/DL
RBC # BLD AUTO: 4.76 M/UL
SODIUM SERPL-SCNC: 142 MMOL/L
TROPONIN I SERPL DL<=0.01 NG/ML-MCNC: 0.01 NG/ML
WBC # BLD AUTO: 4.97 K/UL

## 2018-11-24 PROCEDURE — 85025 COMPLETE CBC W/AUTO DIFF WBC: CPT

## 2018-11-24 PROCEDURE — 94761 N-INVAS EAR/PLS OXIMETRY MLT: CPT

## 2018-11-24 PROCEDURE — 83880 ASSAY OF NATRIURETIC PEPTIDE: CPT

## 2018-11-24 PROCEDURE — 84484 ASSAY OF TROPONIN QUANT: CPT

## 2018-11-24 PROCEDURE — 27000221 HC OXYGEN, UP TO 24 HOURS

## 2018-11-24 PROCEDURE — 94640 AIRWAY INHALATION TREATMENT: CPT

## 2018-11-24 PROCEDURE — 80053 COMPREHEN METABOLIC PANEL: CPT

## 2018-11-24 PROCEDURE — 25000242 PHARM REV CODE 250 ALT 637 W/ HCPCS: Performed by: EMERGENCY MEDICINE

## 2018-11-24 PROCEDURE — 63600175 PHARM REV CODE 636 W HCPCS: Performed by: EMERGENCY MEDICINE

## 2018-11-24 RX ORDER — PREDNISONE 50 MG/1
50 TABLET ORAL DAILY
Qty: 5 TABLET | Refills: 0 | Status: SHIPPED | OUTPATIENT
Start: 2018-11-24 | End: 2018-11-29

## 2018-11-24 RX ORDER — LEVALBUTEROL INHALATION SOLUTION 0.63 MG/3ML
0.63 SOLUTION RESPIRATORY (INHALATION)
Status: COMPLETED | OUTPATIENT
Start: 2018-11-24 | End: 2018-11-24

## 2018-11-24 RX ORDER — PREDNISONE 20 MG/1
40 TABLET ORAL
Status: COMPLETED | OUTPATIENT
Start: 2018-11-24 | End: 2018-11-24

## 2018-11-24 RX ADMIN — LEVALBUTEROL HYDROCHLORIDE 0.63 MG: 0.63 SOLUTION RESPIRATORY (INHALATION) at 03:11

## 2018-11-24 RX ADMIN — PREDNISONE 40 MG: 20 TABLET ORAL at 04:11

## 2018-11-24 RX ADMIN — LEVALBUTEROL HYDROCHLORIDE 0.63 MG: 0.63 SOLUTION RESPIRATORY (INHALATION) at 01:11

## 2018-11-24 RX ADMIN — LEVALBUTEROL HYDROCHLORIDE 0.63 MG: 0.63 SOLUTION RESPIRATORY (INHALATION) at 02:11

## 2018-11-24 NOTE — ED PROVIDER NOTES
Encounter Date: 11/23/2018  SCRIBE #1 NOTE: I, Naeem West, am scribing for, and in the presence of,  Rohith Ochoa MD. I have scribed the entire note.        History     Chief Complaint   Patient presents with    COPD     Patient reports COPD exacerbation x2 hours.        The patient is a 58 y.o. male with co-morbidities including: COPD, HTN who presents to the ED with a complaint of shortness of breath starting tonight. He reports that he has COPD, Hx of occasional episodes for a few years. He is on CPAP for overnight use as needed. He reports he also has a rescue inhaler for incidental use, he is out of this. He reports associated chest tightness which is baseline for his COPD episodes. The patient denies fever recent illness, or any other complaints.            Review of patient's allergies indicates:   Allergen Reactions    Benazepril Swelling    Duoneb [ipratropium-albuterol]      Report Tremors     Past Medical History:   Diagnosis Date    Asthma     COPD (chronic obstructive pulmonary disease)     Hypertension     Pneumonia     Seizures     SOB (shortness of breath)      No past surgical history on file.  Family History   Problem Relation Age of Onset    Cancer Father     Hypertension Sister     Stroke Sister     Stroke Brother     Diabetes Neg Hx     Heart disease Neg Hx      Social History     Tobacco Use    Smoking status: Current Every Day Smoker     Packs/day: 0.25     Types: Cigarettes    Smokeless tobacco: Never Used    Tobacco comment: 1-2 cigs per day   Substance Use Topics    Alcohol use: No    Drug use: No     Review of Systems   Constitutional: Negative for fever.   HENT: Negative for sore throat.    Respiratory: Positive for chest tightness and shortness of breath.    Cardiovascular: Negative for chest pain.   Gastrointestinal: Negative for nausea.   Genitourinary: Negative for dysuria.   Musculoskeletal: Negative for back pain.   Skin: Negative for rash.   Neurological:  Negative for weakness.   Hematological: Does not bruise/bleed easily.       Physical Exam     Initial Vitals [11/23/18 2318]   BP Pulse Resp Temp SpO2   (!) 162/100 94 20 98.2 °F (36.8 °C) 100 %      MAP       --         Physical Exam    Nursing note and vitals reviewed.    Appearance: No acute distress.  Skin: No rashes seen.  Good turgor.  No abrasions.  No ecchymoses.  Eyes: No conjunctival injection.  ENT: Oropharynx clear.    Chest:  Good air movement.  End expiratory wheezing, no retractions,   Cardiovascular: Regular rate and rhythm.  No murmurs. No gallops. No rubs.  Abdomen: Soft.  Not distended.  Nontender.  No guarding.  No rebound. No Masses  Musculoskeletal: Good range of motion all joints.  No deformities.  Neck supple.  No meningismus.  Neurologic: Equal strength in upper and lower extremities bilaterally.  Normal sensation.  No facial droop.  Normal speech.    Mental Status:  Alert and oriented x 3.  Appropriate, conversant.      ED Course   Procedures  Labs Reviewed   CBC W/ AUTO DIFFERENTIAL - Abnormal; Notable for the following components:       Result Value    Hemoglobin 13.3 (*)     RDW 15.0 (*)     Mono # 0.1 (*)     Gran% 76.3 (*)     Mono% 2.0 (*)     All other components within normal limits   COMPREHENSIVE METABOLIC PANEL - Abnormal; Notable for the following components:    BUN, Bld 5 (*)     Albumin 3.2 (*)     AST 84 (*)     ALT 54 (*)     All other components within normal limits   TROPONIN I   B-TYPE NATRIURETIC PEPTIDE     EKG Readings: (Independently Interpreted)   Rhythm: Normal Sinus Rhythm. ST Segments: Normal ST Segments.   T wave inversions anteriorly, unchanged from previous       Imaging Results          X-Ray Chest PA And Lateral (Final result)  Result time 11/24/18 01:57:19    Final result by Mary Bermudez MD (11/24/18 01:57:19)                 Impression:      Findings suggestive of underlying emphysema/COPD.  No acute superimposed cardiopulmonary process identified on  today's exam.      Electronically signed by: Mary Bermudez MD  Date:    11/24/2018  Time:    01:57             Narrative:    EXAMINATION:  XR CHEST PA AND LATERAL    CLINICAL HISTORY:  Chest Pain;    TECHNIQUE:  PA and lateral views of the chest were performed.    COMPARISON:  10/24/2018    FINDINGS:  Cardiac monitoring leads overlie the chest.  The cardiomediastinal silhouette is stable.  There is atherosclerosis of the thoracic aorta.  The visualized airway is unremarkable.  The lungs appear hyperinflated and hyperlucent suggestive of underlying emphysematous change.  There are stable scattered linear opacities suggestive of underlying atelectasis and/or scarring.  There is no large focal consolidation, significant volume of pleural fluid or pneumothorax.  Visualized osseous structures are intact.                                 Medical Decision Making:   Initial Assessment:   Patient here with Sx consistent with COPD exacerbation. X-ray shows no pneumonia. Labs otherwise unremarkable. He was given 2 treatment lev-albuterol with improvement. O2 sat improved, respirations improved. Stable discharged He will be discharged with prednisone for home.     Advised pt to follow up with PCP or return if concerning symptoms arise. Pt understands and agrees with plan. Will d/c home.                          Clinical Impression:   Diagnoses of Shortness of breath and Chest pain were pertinent to this visit.      Disposition:   Disposition: Discharged  Condition: Stable                        Jeff Ochoa MD  11/24/18 2056

## 2019-06-14 NOTE — LETTER
October 4, 2018              [facility]  [address]  [address]  Phone: [phone]  Fax: [phone] October 4, 2018     Patient: Baltazar Mccray   YOB: 1960       To Whom It May Concern:    Baltazar Mccray was admitted to the hospital on 10/3/2018  7:09 AM and discharged on 10/4/2018. He may return to work on 10/8/2018 and may return with no restrictions . .  If you have any questions or concerns, please don't hesitate to call my  office at [phone].      Sincerely,               Massimo Lewis MD  Hospital Medicine Staff  Department of Hospital Medicine   [facility]  10/4/2018 2:41 PM           
no

## 2019-11-30 ENCOUNTER — HOSPITAL ENCOUNTER (INPATIENT)
Facility: HOSPITAL | Age: 59
LOS: 1 days | Discharge: HOME | DRG: 190 | End: 2019-12-01
Attending: EMERGENCY MEDICINE | Admitting: EMERGENCY MEDICINE

## 2019-11-30 DIAGNOSIS — R06.02 SOB (SHORTNESS OF BREATH): ICD-10-CM

## 2019-11-30 DIAGNOSIS — J44.1 COPD EXACERBATION: Primary | ICD-10-CM

## 2019-11-30 LAB
ALBUMIN SERPL BCP-MCNC: 3.3 G/DL (ref 3.5–5.2)
ALLENS TEST: ABNORMAL
ALP SERPL-CCNC: 113 U/L (ref 55–135)
ALT SERPL W/O P-5'-P-CCNC: 19 U/L (ref 10–44)
ANION GAP SERPL CALC-SCNC: 10 MMOL/L (ref 8–16)
AST SERPL-CCNC: 26 U/L (ref 10–40)
BASOPHILS # BLD AUTO: 0.07 K/UL (ref 0–0.2)
BASOPHILS NFR BLD: 1.1 % (ref 0–1.9)
BILIRUB SERPL-MCNC: 1.3 MG/DL (ref 0.1–1)
BNP SERPL-MCNC: 269 PG/ML (ref 0–99)
BUN SERPL-MCNC: 7 MG/DL (ref 6–20)
CALCIUM SERPL-MCNC: 9 MG/DL (ref 8.7–10.5)
CHLORIDE SERPL-SCNC: 93 MMOL/L (ref 95–110)
CO2 SERPL-SCNC: 33 MMOL/L (ref 23–29)
CREAT SERPL-MCNC: 0.9 MG/DL (ref 0.5–1.4)
DELSYS: ABNORMAL
DIFFERENTIAL METHOD: ABNORMAL
EOSINOPHIL # BLD AUTO: 0.1 K/UL (ref 0–0.5)
EOSINOPHIL NFR BLD: 1 % (ref 0–8)
ERYTHROCYTE [DISTWIDTH] IN BLOOD BY AUTOMATED COUNT: 19.4 % (ref 11.5–14.5)
EST. GFR  (AFRICAN AMERICAN): >60 ML/MIN/1.73 M^2
EST. GFR  (NON AFRICAN AMERICAN): >60 ML/MIN/1.73 M^2
FLOW: 1
FLOW: 2
FLOW: 3
GLUCOSE SERPL-MCNC: 136 MG/DL (ref 70–110)
HCO3 UR-SCNC: 30.2 MMOL/L (ref 24–28)
HCO3 UR-SCNC: 36.9 MMOL/L (ref 24–28)
HCO3 UR-SCNC: 37.8 MMOL/L (ref 24–28)
HCT VFR BLD AUTO: 54.3 % (ref 40–54)
HGB BLD-MCNC: 16.2 G/DL (ref 14–18)
IMM GRANULOCYTES # BLD AUTO: 0.02 K/UL (ref 0–0.04)
IMM GRANULOCYTES NFR BLD AUTO: 0.3 % (ref 0–0.5)
INR PPP: 1.1 (ref 0.8–1.2)
LYMPHOCYTES # BLD AUTO: 1.8 K/UL (ref 1–4.8)
LYMPHOCYTES NFR BLD: 28.6 % (ref 18–48)
MCH RBC QN AUTO: 24.1 PG (ref 27–31)
MCHC RBC AUTO-ENTMCNC: 29.8 G/DL (ref 32–36)
MCV RBC AUTO: 81 FL (ref 82–98)
MODE: ABNORMAL
MONOCYTES # BLD AUTO: 0.6 K/UL (ref 0.3–1)
MONOCYTES NFR BLD: 9.6 % (ref 4–15)
NEUTROPHILS # BLD AUTO: 3.6 K/UL (ref 1.8–7.7)
NEUTROPHILS NFR BLD: 59.4 % (ref 38–73)
NRBC BLD-RTO: 0 /100 WBC
PCO2 BLDA: 48.5 MMHG (ref 35–45)
PCO2 BLDA: 67.7 MMHG (ref 35–45)
PCO2 BLDA: 70.4 MMHG (ref 35–45)
PH SMN: 7.33 [PH] (ref 7.35–7.45)
PH SMN: 7.36 [PH] (ref 7.35–7.45)
PH SMN: 7.4 [PH] (ref 7.35–7.45)
PLATELET # BLD AUTO: 257 K/UL (ref 150–350)
PMV BLD AUTO: 10 FL (ref 9.2–12.9)
PO2 BLDA: 15 MMHG (ref 40–60)
PO2 BLDA: 22 MMHG (ref 40–60)
PO2 BLDA: 51 MMHG (ref 80–100)
POC BE: 11 MMOL/L
POC BE: 12 MMOL/L
POC BE: 5 MMOL/L
POC SATURATED O2: 16 % (ref 95–100)
POC SATURATED O2: 32 % (ref 95–100)
POC SATURATED O2: 85 % (ref 95–100)
POC TCO2: 32 MMOL/L (ref 23–27)
POC TCO2: 39 MMOL/L (ref 24–29)
POC TCO2: 40 MMOL/L (ref 24–29)
POTASSIUM SERPL-SCNC: 4.2 MMOL/L (ref 3.5–5.1)
PROT SERPL-MCNC: 7.7 G/DL (ref 6–8.4)
PROTHROMBIN TIME: 11.1 SEC (ref 9–12.5)
RBC # BLD AUTO: 6.73 M/UL (ref 4.6–6.2)
SAMPLE: ABNORMAL
SITE: ABNORMAL
SODIUM SERPL-SCNC: 136 MMOL/L (ref 136–145)
SP02: 92
SP02: 97
TROPONIN I SERPL DL<=0.01 NG/ML-MCNC: 0.09 NG/ML (ref 0–0.03)
TROPONIN I SERPL DL<=0.01 NG/ML-MCNC: 0.09 NG/ML (ref 0–0.03)
WBC # BLD AUTO: 6.12 K/UL (ref 3.9–12.7)

## 2019-11-30 PROCEDURE — 25000003 PHARM REV CODE 250: Performed by: STUDENT IN AN ORGANIZED HEALTH CARE EDUCATION/TRAINING PROGRAM

## 2019-11-30 PROCEDURE — 99291 PR CRITICAL CARE, E/M 30-74 MINUTES: ICD-10-PCS | Mod: ,,, | Performed by: EMERGENCY MEDICINE

## 2019-11-30 PROCEDURE — 36415 COLL VENOUS BLD VENIPUNCTURE: CPT

## 2019-11-30 PROCEDURE — 84484 ASSAY OF TROPONIN QUANT: CPT

## 2019-11-30 PROCEDURE — 82803 BLOOD GASES ANY COMBINATION: CPT

## 2019-11-30 PROCEDURE — 83880 ASSAY OF NATRIURETIC PEPTIDE: CPT

## 2019-11-30 PROCEDURE — 25000242 PHARM REV CODE 250 ALT 637 W/ HCPCS

## 2019-11-30 PROCEDURE — 85025 COMPLETE CBC W/AUTO DIFF WBC: CPT

## 2019-11-30 PROCEDURE — 93010 ELECTROCARDIOGRAM REPORT: CPT | Mod: ,,, | Performed by: INTERNAL MEDICINE

## 2019-11-30 PROCEDURE — 93010 EKG 12-LEAD: ICD-10-PCS | Mod: ,,, | Performed by: INTERNAL MEDICINE

## 2019-11-30 PROCEDURE — 80053 COMPREHEN METABOLIC PANEL: CPT

## 2019-11-30 PROCEDURE — 84484 ASSAY OF TROPONIN QUANT: CPT | Mod: 91

## 2019-11-30 PROCEDURE — 63600175 PHARM REV CODE 636 W HCPCS: Performed by: STUDENT IN AN ORGANIZED HEALTH CARE EDUCATION/TRAINING PROGRAM

## 2019-11-30 PROCEDURE — 99285 EMERGENCY DEPT VISIT HI MDM: CPT | Mod: 25

## 2019-11-30 PROCEDURE — 99223 PR INITIAL HOSPITAL CARE,LEVL III: ICD-10-PCS | Mod: ,,, | Performed by: HOSPITALIST

## 2019-11-30 PROCEDURE — 94640 AIRWAY INHALATION TREATMENT: CPT

## 2019-11-30 PROCEDURE — 11000001 HC ACUTE MED/SURG PRIVATE ROOM

## 2019-11-30 PROCEDURE — 27000221 HC OXYGEN, UP TO 24 HOURS

## 2019-11-30 PROCEDURE — 99900035 HC TECH TIME PER 15 MIN (STAT)

## 2019-11-30 PROCEDURE — 99291 CRITICAL CARE FIRST HOUR: CPT | Mod: ,,, | Performed by: EMERGENCY MEDICINE

## 2019-11-30 PROCEDURE — S4991 NICOTINE PATCH NONLEGEND: HCPCS | Performed by: STUDENT IN AN ORGANIZED HEALTH CARE EDUCATION/TRAINING PROGRAM

## 2019-11-30 PROCEDURE — 96374 THER/PROPH/DIAG INJ IV PUSH: CPT

## 2019-11-30 PROCEDURE — 93005 ELECTROCARDIOGRAM TRACING: CPT

## 2019-11-30 PROCEDURE — 25000003 PHARM REV CODE 250: Performed by: EMERGENCY MEDICINE

## 2019-11-30 PROCEDURE — 99223 1ST HOSP IP/OBS HIGH 75: CPT | Mod: ,,, | Performed by: HOSPITALIST

## 2019-11-30 PROCEDURE — 63600175 PHARM REV CODE 636 W HCPCS: Performed by: EMERGENCY MEDICINE

## 2019-11-30 PROCEDURE — 25000242 PHARM REV CODE 250 ALT 637 W/ HCPCS: Performed by: STUDENT IN AN ORGANIZED HEALTH CARE EDUCATION/TRAINING PROGRAM

## 2019-11-30 PROCEDURE — 25000242 PHARM REV CODE 250 ALT 637 W/ HCPCS: Performed by: EMERGENCY MEDICINE

## 2019-11-30 PROCEDURE — 36600 WITHDRAWAL OF ARTERIAL BLOOD: CPT

## 2019-11-30 PROCEDURE — 85610 PROTHROMBIN TIME: CPT

## 2019-11-30 PROCEDURE — 94761 N-INVAS EAR/PLS OXIMETRY MLT: CPT

## 2019-11-30 PROCEDURE — 87502 INFLUENZA DNA AMP PROBE: CPT

## 2019-11-30 RX ORDER — AZITHROMYCIN 250 MG/1
500 TABLET, FILM COATED ORAL ONCE
Status: COMPLETED | OUTPATIENT
Start: 2019-11-30 | End: 2019-11-30

## 2019-11-30 RX ORDER — HEPARIN SODIUM 5000 [USP'U]/ML
5000 INJECTION, SOLUTION INTRAVENOUS; SUBCUTANEOUS EVERY 8 HOURS
Status: DISCONTINUED | OUTPATIENT
Start: 2019-11-30 | End: 2019-12-01 | Stop reason: HOSPADM

## 2019-11-30 RX ORDER — FLUTICASONE FUROATE AND VILANTEROL 100; 25 UG/1; UG/1
1 POWDER RESPIRATORY (INHALATION) DAILY
Status: DISCONTINUED | OUTPATIENT
Start: 2019-11-30 | End: 2019-11-30

## 2019-11-30 RX ORDER — ASPIRIN 325 MG
325 TABLET ORAL
Status: COMPLETED | OUTPATIENT
Start: 2019-11-30 | End: 2019-11-30

## 2019-11-30 RX ORDER — TALC
6 POWDER (GRAM) TOPICAL NIGHTLY PRN
Status: DISCONTINUED | OUTPATIENT
Start: 2019-11-30 | End: 2019-12-01 | Stop reason: HOSPADM

## 2019-11-30 RX ORDER — PANTOPRAZOLE SODIUM 40 MG/1
40 TABLET, DELAYED RELEASE ORAL DAILY
Status: DISCONTINUED | OUTPATIENT
Start: 2019-11-30 | End: 2019-12-01 | Stop reason: HOSPADM

## 2019-11-30 RX ORDER — AMLODIPINE BESYLATE 10 MG/1
10 TABLET ORAL DAILY
Status: DISCONTINUED | OUTPATIENT
Start: 2019-11-30 | End: 2019-12-01 | Stop reason: HOSPADM

## 2019-11-30 RX ORDER — POLYETHYLENE GLYCOL 3350 17 G/17G
17 POWDER, FOR SOLUTION ORAL DAILY
Status: DISCONTINUED | OUTPATIENT
Start: 2019-11-30 | End: 2019-12-01 | Stop reason: HOSPADM

## 2019-11-30 RX ORDER — PREDNISONE 10 MG/1
40 TABLET ORAL DAILY
Status: DISCONTINUED | OUTPATIENT
Start: 2019-12-01 | End: 2019-12-01 | Stop reason: HOSPADM

## 2019-11-30 RX ORDER — IBUPROFEN 200 MG
1 TABLET ORAL DAILY
Status: DISCONTINUED | OUTPATIENT
Start: 2019-11-30 | End: 2019-12-01 | Stop reason: HOSPADM

## 2019-11-30 RX ORDER — TIOTROPIUM BROMIDE 18 UG/1
1 CAPSULE ORAL; RESPIRATORY (INHALATION) DAILY
Status: DISCONTINUED | OUTPATIENT
Start: 2019-11-30 | End: 2019-12-01 | Stop reason: HOSPADM

## 2019-11-30 RX ORDER — AZITHROMYCIN 250 MG/1
250 TABLET, FILM COATED ORAL DAILY
Status: DISCONTINUED | OUTPATIENT
Start: 2019-12-01 | End: 2019-12-01 | Stop reason: HOSPADM

## 2019-11-30 RX ORDER — FLUTICASONE FUROATE AND VILANTEROL 100; 25 UG/1; UG/1
1 POWDER RESPIRATORY (INHALATION) DAILY
Status: DISCONTINUED | OUTPATIENT
Start: 2019-11-30 | End: 2019-12-01 | Stop reason: HOSPADM

## 2019-11-30 RX ORDER — CALCIUM CARBONATE 200(500)MG
500 TABLET,CHEWABLE ORAL 3 TIMES DAILY PRN
Status: DISCONTINUED | OUTPATIENT
Start: 2019-11-30 | End: 2019-12-01 | Stop reason: HOSPADM

## 2019-11-30 RX ORDER — ACETAMINOPHEN 325 MG/1
650 TABLET ORAL EVERY 4 HOURS PRN
Status: DISCONTINUED | OUTPATIENT
Start: 2019-11-30 | End: 2019-12-01 | Stop reason: HOSPADM

## 2019-11-30 RX ORDER — SODIUM CHLORIDE 0.9 % (FLUSH) 0.9 %
10 SYRINGE (ML) INJECTION
Status: DISCONTINUED | OUTPATIENT
Start: 2019-11-30 | End: 2019-12-01 | Stop reason: HOSPADM

## 2019-11-30 RX ORDER — LOSARTAN POTASSIUM AND HYDROCHLOROTHIAZIDE 12.5; 1 MG/1; MG/1
1 TABLET ORAL DAILY
Status: DISCONTINUED | OUTPATIENT
Start: 2019-11-30 | End: 2019-12-01 | Stop reason: HOSPADM

## 2019-11-30 RX ORDER — IPRATROPIUM BROMIDE AND ALBUTEROL SULFATE 2.5; .5 MG/3ML; MG/3ML
SOLUTION RESPIRATORY (INHALATION)
Status: COMPLETED
Start: 2019-11-30 | End: 2019-11-30

## 2019-11-30 RX ORDER — METHYLPREDNISOLONE SOD SUCC 125 MG
125 VIAL (EA) INJECTION
Status: COMPLETED | OUTPATIENT
Start: 2019-11-30 | End: 2019-11-30

## 2019-11-30 RX ORDER — SODIUM CHLORIDE 0.9 % (FLUSH) 0.9 %
3 SYRINGE (ML) INJECTION
Status: DISCONTINUED | OUTPATIENT
Start: 2019-11-30 | End: 2019-12-01 | Stop reason: HOSPADM

## 2019-11-30 RX ORDER — IPRATROPIUM BROMIDE AND ALBUTEROL SULFATE 2.5; .5 MG/3ML; MG/3ML
3 SOLUTION RESPIRATORY (INHALATION)
Status: DISCONTINUED | OUTPATIENT
Start: 2019-11-30 | End: 2019-12-01 | Stop reason: HOSPADM

## 2019-11-30 RX ORDER — AMOXICILLIN 250 MG
1 CAPSULE ORAL 2 TIMES DAILY
Status: DISCONTINUED | OUTPATIENT
Start: 2019-11-30 | End: 2019-12-01 | Stop reason: HOSPADM

## 2019-11-30 RX ORDER — IPRATROPIUM BROMIDE AND ALBUTEROL SULFATE 2.5; .5 MG/3ML; MG/3ML
3 SOLUTION RESPIRATORY (INHALATION)
Status: COMPLETED | OUTPATIENT
Start: 2019-11-30 | End: 2019-11-30

## 2019-11-30 RX ORDER — ONDANSETRON 2 MG/ML
4 INJECTION INTRAMUSCULAR; INTRAVENOUS EVERY 12 HOURS PRN
Status: DISCONTINUED | OUTPATIENT
Start: 2019-11-30 | End: 2019-12-01 | Stop reason: HOSPADM

## 2019-11-30 RX ADMIN — Medication 1 PATCH: at 03:11

## 2019-11-30 RX ADMIN — PANTOPRAZOLE SODIUM 40 MG: 40 TABLET, DELAYED RELEASE ORAL at 03:11

## 2019-11-30 RX ADMIN — LOSARTAN POTASSIUM AND HYDROCHLOROTHIAZIDE 1 TABLET: 100; 12.5 TABLET, FILM COATED ORAL at 03:11

## 2019-11-30 RX ADMIN — Medication 6 MG: at 10:11

## 2019-11-30 RX ADMIN — CALCIUM CARBONATE (ANTACID) CHEW TAB 500 MG 500 MG: 500 CHEW TAB at 08:11

## 2019-11-30 RX ADMIN — AMLODIPINE BESYLATE 10 MG: 10 TABLET ORAL at 03:11

## 2019-11-30 RX ADMIN — HEPARIN SODIUM 5000 UNITS: 5000 INJECTION, SOLUTION INTRAVENOUS; SUBCUTANEOUS at 03:11

## 2019-11-30 RX ADMIN — AZITHROMYCIN 500 MG: 250 TABLET, FILM COATED ORAL at 03:11

## 2019-11-30 RX ADMIN — METHYLPREDNISOLONE SODIUM SUCCINATE 125 MG: 125 INJECTION, POWDER, FOR SOLUTION INTRAMUSCULAR; INTRAVENOUS at 12:11

## 2019-11-30 RX ADMIN — SENNOSIDES AND DOCUSATE SODIUM 1 TABLET: 8.6; 5 TABLET ORAL at 08:11

## 2019-11-30 RX ADMIN — ASPIRIN 325 MG ORAL TABLET 325 MG: 325 PILL ORAL at 01:11

## 2019-11-30 RX ADMIN — IPRATROPIUM BROMIDE AND ALBUTEROL SULFATE 3 ML: .5; 3 SOLUTION RESPIRATORY (INHALATION) at 08:11

## 2019-11-30 RX ADMIN — IPRATROPIUM BROMIDE AND ALBUTEROL SULFATE 3 ML: .5; 3 SOLUTION RESPIRATORY (INHALATION) at 12:11

## 2019-11-30 RX ADMIN — HEPARIN SODIUM 5000 UNITS: 5000 INJECTION, SOLUTION INTRAVENOUS; SUBCUTANEOUS at 09:11

## 2019-11-30 NOTE — H&P
[facility] Hospital Medicine  History & Physical    Patient Name: Baltazar Mccray  MRN: [id number]  Admission Date: 11/30/2019  Attending Physician: Nirmala Cruz MD   Primary Care Provider: [facility] Medicine Team: [facility] 4 Jeff Rhodes MD     Patient information was obtained from patient, past medical records and ER records.     Subjective:     Principal Problem:<principal problem not specified>    Chief Complaint:   Chief Complaint   Patient presents with    COPD     states supposed to be on home 02        HPI: Mr Mccray is a 58 yo M with PMH of COPD, asthma, HTN, and seizure disorder (last seizure 3-4 yrs ago) presenting with severe shortness of breath. Pt reports that he began to feel dyspneic evening of 11/29 despite his usual 2 L of oxygen which he uses in the evenings after work. He reports feeling nauseous and unwell to the point of having to call in sick for work this AM. Patient has not been taking any of his prescribed medications lately; he has not refilled them because he was feeling well. Reports dizziness when feeling short of breath and getting up to walk around. On arrival to ED, pt was found to be satting 70s ORA which improved to low 90s on 5L O2 NC.  Given 125 mg solumedrol, started on duonebs, and given . Pt has come to the ED many times over the last several years for similar presentation. When asked about his seizure disorder, pt reports that last seizure was 3-4 years ago but has not been taking his meds as he has been asymptomatic. Per chart check, he was previously prescribed Keppra. He denies F/C, cough, sore throat, rhinorrhea, chest pain, abd pain, blood in stool, melena, urinary symptoms.   Pt smokes 1-2 cigarettes/day, reports 1 beer/day after work, and denies IVDU or other illicit drugs    Past Medical History:   Diagnosis Date    Asthma     COPD (chronic obstructive pulmonary disease)     Hypertension     Pneumonia      Seizures     SOB (shortness of breath)        History reviewed. No pertinent surgical history.    Review of patient's allergies indicates:   Allergen Reactions    Benazepril Swelling    Duoneb [ipratropium-albuterol]      Report Tremors       No current facility-administered medications on file prior to encounter.      Current Outpatient Medications on File Prior to Encounter   Medication Sig    albuterol (PROVENTIL/VENTOLIN HFA) 90 mcg/actuation inhaler Inhale 2 puffs into the lungs every 4 (four) hours as needed for Wheezing.    fluticasone-vilanterol (BREO ELLIPTA) 100-25 mcg/dose diskus inhaler Inhale 1 puff into the lungs once daily. Controller    ibuprofen (ADVIL,MOTRIN) 800 MG tablet Take 1 tablet (800 mg total) by mouth every 6 (six) hours as needed for Pain.    ipratropium-albuterol (COMBIVENT)  mcg/actuation inhaler Inhale 1 puff into the lungs 4 (four) times daily. Rescue    amLODIPine (NORVASC) 10 MG tablet Take 1 tablet (10 mg total) by mouth once daily. Contact [facility] for refills    losartan-hydrochlorothiazide 100-12.5 mg (HYZAAR) 100-12.5 mg Tab Take 1 tablet by mouth once daily. Contact [facility] for refills    nicotine (NICODERM CQ) 14 mg/24 hr Place 1 patch onto the skin once daily.    omeprazole (PRILOSEC) 20 MG capsule Take 1 capsule (20 mg total) by mouth once daily.    tiotropium (SPIRIVA) 18 mcg inhalation capsule Inhale 1 capsule (18 mcg total) into the lungs once daily. Controller. Contact [facility] for refills     Family History     Problem Relation (Age of Onset)    Cancer Father    Hypertension Sister    Stroke Sister, Brother        Tobacco Use    Smoking status: Current Every Day Smoker     Packs/day: 0.25     Types: Cigarettes    Smokeless tobacco: Never Used    Tobacco comment: 1-2 cigs per day   Substance and Sexual Activity    Alcohol use: No    Drug use: No    Sexual activity: Not on file     Review of Systems    Constitutional: Negative for chills, diaphoresis and fever.   HENT: Negative for congestion, rhinorrhea and sore throat.    Eyes: Negative for visual disturbance.   Respiratory: Positive for shortness of breath and wheezing. Negative for cough.    Cardiovascular: Negative for chest pain.   Gastrointestinal: Positive for nausea. Negative for abdominal pain, blood in stool, constipation and diarrhea.   Genitourinary: Negative for dysuria and hematuria.   Musculoskeletal: Negative for myalgias.   Neurological: Positive for dizziness. Negative for headaches.   Psychiatric/Behavioral: Negative for agitation.     Objective:     Vital Signs (Most Recent):  Temp: 98.4 °F (36.9 °C) (11/30/19 1222)  Pulse: 82 (11/30/19 1342)  Resp: 20 (11/30/19 1320)  BP: (!) 146/69 (11/30/19 1342)  SpO2: (!) 92 % (11/30/19 1342) Vital Signs (24h Range):  Temp:  [98.4 °F (36.9 °C)] 98.4 °F (36.9 °C)  Pulse:  [74-93] 82  Resp:  [18-30] 20  SpO2:  [78 %-99 %] 92 %  BP: (137-178)/() 146/69     Weight: 61.2 kg (135 lb)  Body mass index is 20.53 kg/m².    Physical Exam   Constitutional: He is oriented to person, place, and time. He appears well-developed and well-nourished. No distress.   HENT:   Head: Normocephalic and atraumatic.   Right Ear: External ear normal.   Left Ear: External ear normal.   Mouth/Throat: Oropharynx is clear and moist.   Eyes: Pupils are equal, round, and reactive to light. EOM are normal.   Neck: Normal range of motion.   Cardiovascular: Normal rate and regular rhythm.   Pulmonary/Chest: Effort normal. No respiratory distress. He has wheezes (minimal post duoneb treatment). He has rales (minimal in b/l bases).   Abdominal: Soft. Bowel sounds are normal. He exhibits no mass. There is no tenderness. There is no guarding.   Musculoskeletal: He exhibits no edema.   Neurological: He is alert and oriented to person, place, and time. No cranial nerve deficit.   Skin: Skin is warm and dry. He is not diaphoretic.          CRANIAL NERVES     CN III, IV, VI   Pupils are equal, round, and reactive to light.  Extraocular motions are normal.        Significant Labs: All pertinent labs within the past 24 hours have been reviewed.    Significant Imaging: I have reviewed all pertinent imaging results/findings within the past 24 hours.    Assessment/Plan:     COPD exacerbation  59 M with h/o COPD, asthma, HTN, and seizure disorder who presents with severe SOB x2 days after not refilling any medications because he was previously not feeling unwell. Satting in 70s on arrival which improved to 90s with 5L NC. ABG on arrival showed ph 7.32, CO2 70, O2 15. Received solumedrol 125 one time, , and triple therapy in ED.     - Duonebs [id number]  - home Breo   - home spiriva  - prednisone 40 mg x 5 days  - Azithro 500 mg 1 day followed by 250 for 4 days  - Patient counseled on importance of medication compliance despite feeling otherwise well      Acute hypoxemic respiratory failure  See COPD exacerbation      Tobacco abuse  Pt counseled on effect of smoking on lung disease and risk of cancer    - Nicotine patch    Chronic respiratory failure with hypoxia and hypercapnia  See COPD exacerbation      Elevated troponin  Initial Trop elevated at 0.094    - Additional troponin ordered      Hx of seizure disorder  Last seizure 3-4 years ago per patient. Previously on Keppra     Essential hypertension  Pt hypertensive 137/104 - 178/99 in ED    - restarting home losartan/hctz 100-12.5 and norvasc 10           VTE Risk Mitigation (From admission, onward)         Ordered     heparin (porcine) injection 5,000 Units  Every 8 hours      11/30/19 1354     IP VTE HIGH RISK PATIENT  Once      11/30/19 1354                   Jeff Rhodes MD  Department of Hospital Medicine   [facility]

## 2019-11-30 NOTE — HPI
Mr Mahendra is a 58 yo M with PMH of COPD, asthma, HTN, and seizure disorder (last seizure 3-4 yrs ago) presenting with severe shortness of breath. Pt reports that he began to feel dyspneic evening of 11/29 despite his usual 2 L of oxygen which he uses in the evenings after work. He reports feeling nauseous and unwell to the point of having to call in sick for work this AM. Patient has not been taking any of his prescribed medications lately; he has not refilled them because he was feeling well. Reports dizziness when feeling short of breath and getting up to walk around. On arrival to ED, pt was found to be satting 70s ORA which improved to low 90s on 5L O2 NC. Given 125 mg solumedrol, started on duonebs, and given . Pt has come to the ED many times over the last several years for similar presentation. When asked about his seizure disorder, pt reports that last seizure was 3-4 years ago but has not been taking his meds as he has been asymptomatic. Per chart check, he was previously prescribed Keppra. He denies F/C, cough, sore throat, rhinorrhea, chest pain, abd pain, blood in stool, melena, urinary symptoms.   Pt smokes 1-2 cigarettes/day, reports 1 beer/day after work, and denies IVDU or other illicit drugs

## 2019-11-30 NOTE — SUBJECTIVE & OBJECTIVE
Past Medical History:   Diagnosis Date    Asthma     COPD (chronic obstructive pulmonary disease)     Hypertension     Pneumonia     Seizures     SOB (shortness of breath)        History reviewed. No pertinent surgical history.    Review of patient's allergies indicates:   Allergen Reactions    Benazepril Swelling    Duoneb [ipratropium-albuterol]      Report Tremors       No current facility-administered medications on file prior to encounter.      Current Outpatient Medications on File Prior to Encounter   Medication Sig    albuterol (PROVENTIL/VENTOLIN HFA) 90 mcg/actuation inhaler Inhale 2 puffs into the lungs every 4 (four) hours as needed for Wheezing.    fluticasone-vilanterol (BREO ELLIPTA) 100-25 mcg/dose diskus inhaler Inhale 1 puff into the lungs once daily. Controller    ibuprofen (ADVIL,MOTRIN) 800 MG tablet Take 1 tablet (800 mg total) by mouth every 6 (six) hours as needed for Pain.    ipratropium-albuterol (COMBIVENT)  mcg/actuation inhaler Inhale 1 puff into the lungs 4 (four) times daily. Rescue    amLODIPine (NORVASC) 10 MG tablet Take 1 tablet (10 mg total) by mouth once daily. Contact [facility] for refills    losartan-hydrochlorothiazide 100-12.5 mg (HYZAAR) 100-12.5 mg Tab Take 1 tablet by mouth once daily. Contact [facility] for refills    nicotine (NICODERM CQ) 14 mg/24 hr Place 1 patch onto the skin once daily.    omeprazole (PRILOSEC) 20 MG capsule Take 1 capsule (20 mg total) by mouth once daily.    tiotropium (SPIRIVA) 18 mcg inhalation capsule Inhale 1 capsule (18 mcg total) into the lungs once daily. Controller. Contact [facility] for refills     Family History     Problem Relation (Age of Onset)    Cancer Father    Hypertension Sister    Stroke Sister, Brother        Tobacco Use    Smoking status: Current Every Day Smoker     Packs/day: 0.25     Types: Cigarettes    Smokeless tobacco: Never Used    Tobacco comment: 1-2 cigs per  day   Substance and Sexual Activity    Alcohol use: No    Drug use: No    Sexual activity: Not on file     Review of Systems   Constitutional: Negative for chills, diaphoresis and fever.   HENT: Negative for congestion, rhinorrhea and sore throat.    Eyes: Negative for visual disturbance.   Respiratory: Positive for shortness of breath and wheezing. Negative for cough.    Cardiovascular: Negative for chest pain.   Gastrointestinal: Positive for nausea. Negative for abdominal pain, blood in stool, constipation and diarrhea.   Genitourinary: Negative for dysuria and hematuria.   Musculoskeletal: Negative for myalgias.   Neurological: Positive for dizziness. Negative for headaches.   Psychiatric/Behavioral: Negative for agitation.     Objective:     Vital Signs (Most Recent):  Temp: 98.4 °F (36.9 °C) (11/30/19 1222)  Pulse: 82 (11/30/19 1342)  Resp: 20 (11/30/19 1320)  BP: (!) 146/69 (11/30/19 1342)  SpO2: (!) 92 % (11/30/19 1342) Vital Signs (24h Range):  Temp:  [98.4 °F (36.9 °C)] 98.4 °F (36.9 °C)  Pulse:  [74-93] 82  Resp:  [18-30] 20  SpO2:  [78 %-99 %] 92 %  BP: (137-178)/() 146/69     Weight: 61.2 kg (135 lb)  Body mass index is 20.53 kg/m².    Physical Exam   Constitutional: He is oriented to person, place, and time. He appears well-developed and well-nourished. No distress.   HENT:   Head: Normocephalic and atraumatic.   Right Ear: External ear normal.   Left Ear: External ear normal.   Mouth/Throat: Oropharynx is clear and moist.   Eyes: Pupils are equal, round, and reactive to light. EOM are normal.   Neck: Normal range of motion.   Cardiovascular: Normal rate and regular rhythm.   Pulmonary/Chest: Effort normal. No respiratory distress. He has wheezes (minimal post duoneb treatment). He has rales (minimal in b/l bases).   Abdominal: Soft. Bowel sounds are normal. He exhibits no mass. There is no tenderness. There is no guarding.   Musculoskeletal: He exhibits no edema.   Neurological: He is alert  and oriented to person, place, and time. No cranial nerve deficit.   Skin: Skin is warm and dry. He is not diaphoretic.         CRANIAL NERVES     CN III, IV, VI   Pupils are equal, round, and reactive to light.  Extraocular motions are normal.        Significant Labs: All pertinent labs within the past 24 hours have been reviewed.    Significant Imaging: I have reviewed all pertinent imaging results/findings within the past 24 hours.

## 2019-11-30 NOTE — ED NOTES
Patient reports that he has not been to his PCP recently because he has been at work a lot, and that he has not had his medications at home.

## 2019-11-30 NOTE — ASSESSMENT & PLAN NOTE
59 M with h/o COPD, asthma, HTN, and seizure disorder who presents with severe SOB x2 days after not refilling any medications because he was previously not feeling unwell. Satting in 70s on arrival which improved to 90s with 5L NC. ABG on arrival showed ph 7.32, CO2 70, O2 15. Received solumedrol 125 one time, , and triple therapy in ED.     - Duonebs [id number]  - home Breo   - home spiriva  - prednisone 40 mg x 5 days  - Azithro 500 mg 1 day followed by 250 for 4 days  - Patient counseled on importance of medication compliance despite feeling otherwise well

## 2019-11-30 NOTE — ASSESSMENT & PLAN NOTE
Pt hypertensive 137/104 - 178/99 in ED    - restarting home losartan/hctz 100-12.5 and norvasc 10

## 2019-11-30 NOTE — LETTER
December 1, 2019         [address]  [facility] [address]  Phone: [phone]  Fax: [phone]       Patient: Baltazar Mccray   YOB: 1960  Date of Visit: 12/01/2019    To Whom It May Concern:    Andra Mccray  was at [facility] on 12/01/2019. He may return to work/school on 12/04 with no restrictions. If you have any questions or concerns, or if I can be of further assistance, please do not hesitate to contact me.    Sincerely,    Jeff Rhodes MD

## 2019-11-30 NOTE — ED TRIAGE NOTES
Patient is a 58 yo male in for eval of SOB x 2 days. Patient reports that he wears O2 at home at night (wears 2L), but over the past two days he has had to wear his O2 constantly at home and has had to call into work because he has felt so poorly. O2 sat in th 70s on room air initially - placed on 5L via NC and sats improved to the low 90s. Dr. Yang and multiple RNs at bedside on patient arrival.

## 2019-11-30 NOTE — ED PROVIDER NOTES
Encounter Date: 11/30/2019       History     Chief Complaint   Patient presents with    COPD     states supposed to be on home 02     59-year-old male, history of COPD, hypertension, presenting with shortness of breath, severe, onset 2-3 days ago, progressive since then.  Patient has been out of all of his medicines recently.  He denies any cough, fevers, chills, chest pain. He normally wears 2 L of oxygen but only in the evenings.  At work today he had severe shortness of breath/respiratory distress and presented here to the ED for evaluation.    The history is provided by the patient. The history is limited by the condition of the patient.     Review of patient's allergies indicates:   Allergen Reactions    Benazepril Swelling    Duoneb [ipratropium-albuterol]      Report Tremors     Past Medical History:   Diagnosis Date    Asthma     COPD (chronic obstructive pulmonary disease)     Hypertension     Pneumonia     Seizures     SOB (shortness of breath)      History reviewed. No pertinent surgical history.  Family History   Problem Relation Age of Onset    Cancer Father     Hypertension Sister     Stroke Sister     Stroke Brother     Diabetes Neg Hx     Heart disease Neg Hx      Social History     Tobacco Use    Smoking status: Current Every Day Smoker     Packs/day: 0.25     Types: Cigarettes    Smokeless tobacco: Never Used    Tobacco comment: 1-2 cigs per day   Substance Use Topics    Alcohol use: No    Drug use: No     Review of Systems   Constitutional: Negative for chills, diaphoresis, fatigue and fever.   Respiratory: Positive for shortness of breath. Negative for cough, chest tightness and wheezing.    Cardiovascular: Negative for leg swelling.   Neurological: Negative for syncope.   All other systems reviewed and are negative.      Physical Exam     Initial Vitals [11/30/19 1222]   BP Pulse Resp Temp SpO2   (!) 137/104 93 (!) 30 98.4 °F (36.9 °C) (!) 78 %      MAP       --          Physical Exam    Nursing note and vitals reviewed.  Constitutional: He is not diaphoretic. He appears distressed.   HENT:   Mouth/Throat: Oropharynx is clear and moist.   Eyes: Conjunctivae are normal.   Neck: Neck supple.   Cardiovascular: Normal rate and regular rhythm.   Pulmonary/Chest: He is in respiratory distress. He has no wheezes. He has no rhonchi. He has no rales. He exhibits no tenderness.   Abdominal: He exhibits no distension.   Musculoskeletal: He exhibits no edema.   Neurological: He is alert and oriented to person, place, and time.   Skin: Skin is warm and dry. No pallor.   Psychiatric: He has a normal mood and affect. Thought content normal.         ED Course   Procedures  Labs Reviewed   B-TYPE NATRIURETIC PEPTIDE - Abnormal; Notable for the following components:       Result Value     (*)     All other components within normal limits   COMPREHENSIVE METABOLIC PANEL - Abnormal; Notable for the following components:    Chloride 93 (*)     CO2 33 (*)     Glucose 136 (*)     Albumin 3.3 (*)     Total Bilirubin 1.3 (*)     All other components within normal limits   CBC W/ AUTO DIFFERENTIAL - Abnormal; Notable for the following components:    RBC 6.73 (*)     Hematocrit 54.3 (*)     Mean Corpuscular Volume 81 (*)     Mean Corpuscular Hemoglobin 24.1 (*)     Mean Corpuscular Hemoglobin Conc 29.8 (*)     RDW 19.4 (*)     All other components within normal limits   TROPONIN I - Abnormal; Notable for the following components:    Troponin I 0.094 (*)     All other components within normal limits   ISTAT PROCEDURE - Abnormal; Notable for the following components:    POC PH 7.327 (*)     POC PCO2 70.4 (*)     POC PO2 15 (*)     POC HCO3 36.9 (*)     POC SATURATED O2 16 (*)     POC TCO2 39 (*)     All other components within normal limits   PROTIME-INR     EKG Readings: (Independently Interpreted)   Initial Reading: No STEMI. Rhythm: Normal Sinus Rhythm.   T-wave inversions in the anterior leads  that are old       Imaging Results          X-Ray Chest AP Portable (Final result)  Result time 11/30/19 12:57:43    Final result by David Jordan MD (11/30/19 12:57:43)                 Impression:      1. No interval detrimental change when compared to radiograph dated 11/24/2018.  COPD/emphysema.      Electronically signed by: David Jordan MD  Date:    11/30/2019  Time:    12:57             Narrative:    EXAMINATION:  XR CHEST AP PORTABLE    CLINICAL HISTORY:  sob;    TECHNIQUE:  Single frontal view of the chest was performed.    COMPARISON:  Radiograph 11/24/2018.    FINDINGS:  Cardiac monitoring leads project over the bilateral hemithoraces.  Mediastinal structures are midline.  There is slight prominence of the central pulmonary vasculature.  Cardiac silhouette is normal in size.  Lungs are overexpanded but symmetric.  No consolidation.  No pneumothorax or pleural effusions.  No free air beneath the diaphragm.  No acute osseous abnormalities.                                 Medical Decision Making:   Initial Assessment:   59-year-old male, past medical history as above, presenting to the ED in respiratory distress with severe hypoxia    Lungs he has decreased air movement but no obvious wheezing    02 placed on patient immediately after arrival with improvement in his oxygenation and respiratory status  Differential Diagnosis:   Suspect COPD exacerbation  Will rule out underlying pneumonia, pneumothorax  Clinical Tests:   Lab Tests: Ordered and Reviewed  Radiological Study: Ordered and Reviewed  Medical Tests: Ordered and Reviewed  ED Management:  Continuous nebs  IV steroids  VBG to assess for hypercapnia  Chest x-ray  Lab work  Plan for admission    1:12 PM  Patient with significant hypercapnia but only mild acidosis with an elevated bicarb so suspect that patient is well compensated in near his baseline.  Patient is currently receiving nebs and reports significant improvement in his dyspnea.  Will  admit to Medicine for further management.  Given his very well appearance, I do not think that he needs BiPAP at this time.    1:30 PM  Patient remains well appearing.  He is satting 92% on 2 L. his BNP is mildly elevated but I do not see signs of CHF on chest x-ray.  His troponin is moderately elevated but I suspect this is secondary to the patient's severe hypoxia prior to arrival here.  Will admit patient to Medicine for further management of his COPD and serial troponins.              Attending Attestation:         Attending Critical Care:   Critical Care Times:   ==============================================================  · Total Critical Care Time - exclusive of procedural time: 35 minutes.  ==============================================================  Critical care was necessary to treat or prevent imminent or life-threatening deterioration of the following conditions: respiratory failure.   Critical care was time spent personally by me on the following activities: obtaining history from patient or relative, examination of patient, review of x-rays / CT sent with the patient, review of old charts, ordering lab, x-rays, and/or EKG, development of treatment plan with patient or relative, ordering and performing treatments and interventions, evaluation of patient's response to treatment and re-evaluation of patient's conition.   Critical Care Condition: critical                             Clinical Impression:       ICD-10-CM ICD-9-CM   1. COPD exacerbation J44.1 491.21   2. SOB (shortness of breath) R06.02 786.05                             Vangie Yang MD  11/30/19 134

## 2019-12-01 VITALS
SYSTOLIC BLOOD PRESSURE: 137 MMHG | HEIGHT: 68 IN | HEART RATE: 71 BPM | DIASTOLIC BLOOD PRESSURE: 80 MMHG | OXYGEN SATURATION: 93 % | BODY MASS INDEX: 20.76 KG/M2 | RESPIRATION RATE: 21 BRPM | TEMPERATURE: 97 F | WEIGHT: 137 LBS

## 2019-12-01 LAB
ANION GAP SERPL CALC-SCNC: 6 MMOL/L (ref 8–16)
BASOPHILS # BLD AUTO: 0.01 K/UL (ref 0–0.2)
BASOPHILS NFR BLD: 0.2 % (ref 0–1.9)
BUN SERPL-MCNC: 8 MG/DL (ref 6–20)
CALCIUM SERPL-MCNC: 9.2 MG/DL (ref 8.7–10.5)
CHLORIDE SERPL-SCNC: 95 MMOL/L (ref 95–110)
CO2 SERPL-SCNC: 33 MMOL/L (ref 23–29)
CREAT SERPL-MCNC: 0.8 MG/DL (ref 0.5–1.4)
DIFFERENTIAL METHOD: ABNORMAL
EOSINOPHIL # BLD AUTO: 0 K/UL (ref 0–0.5)
EOSINOPHIL NFR BLD: 0 % (ref 0–8)
ERYTHROCYTE [DISTWIDTH] IN BLOOD BY AUTOMATED COUNT: 18.9 % (ref 11.5–14.5)
EST. GFR  (AFRICAN AMERICAN): >60 ML/MIN/1.73 M^2
EST. GFR  (NON AFRICAN AMERICAN): >60 ML/MIN/1.73 M^2
GLUCOSE SERPL-MCNC: 185 MG/DL (ref 70–110)
HCT VFR BLD AUTO: 52.2 % (ref 40–54)
HGB BLD-MCNC: 16.2 G/DL (ref 14–18)
IMM GRANULOCYTES # BLD AUTO: 0.02 K/UL (ref 0–0.04)
IMM GRANULOCYTES NFR BLD AUTO: 0.4 % (ref 0–0.5)
INFLUENZA A, MOLECULAR: NEGATIVE
INFLUENZA B, MOLECULAR: NEGATIVE
LYMPHOCYTES # BLD AUTO: 0.5 K/UL (ref 1–4.8)
LYMPHOCYTES NFR BLD: 9.5 % (ref 18–48)
MAGNESIUM SERPL-MCNC: 1.7 MG/DL (ref 1.6–2.6)
MCH RBC QN AUTO: 24.3 PG (ref 27–31)
MCHC RBC AUTO-ENTMCNC: 31 G/DL (ref 32–36)
MCV RBC AUTO: 78 FL (ref 82–98)
MONOCYTES # BLD AUTO: 0.1 K/UL (ref 0.3–1)
MONOCYTES NFR BLD: 2.4 % (ref 4–15)
NEUTROPHILS # BLD AUTO: 4.7 K/UL (ref 1.8–7.7)
NEUTROPHILS NFR BLD: 87.5 % (ref 38–73)
NRBC BLD-RTO: 0 /100 WBC
PHOSPHATE SERPL-MCNC: 2.5 MG/DL (ref 2.7–4.5)
PLATELET # BLD AUTO: 243 K/UL (ref 150–350)
PMV BLD AUTO: 10.5 FL (ref 9.2–12.9)
POTASSIUM SERPL-SCNC: 3.9 MMOL/L (ref 3.5–5.1)
RBC # BLD AUTO: 6.67 M/UL (ref 4.6–6.2)
SODIUM SERPL-SCNC: 134 MMOL/L (ref 136–145)
SPECIMEN SOURCE: NORMAL
WBC # BLD AUTO: 5.37 K/UL (ref 3.9–12.7)

## 2019-12-01 PROCEDURE — S4991 NICOTINE PATCH NONLEGEND: HCPCS | Performed by: STUDENT IN AN ORGANIZED HEALTH CARE EDUCATION/TRAINING PROGRAM

## 2019-12-01 PROCEDURE — 36415 COLL VENOUS BLD VENIPUNCTURE: CPT

## 2019-12-01 PROCEDURE — 25000003 PHARM REV CODE 250: Performed by: STUDENT IN AN ORGANIZED HEALTH CARE EDUCATION/TRAINING PROGRAM

## 2019-12-01 PROCEDURE — 94761 N-INVAS EAR/PLS OXIMETRY MLT: CPT

## 2019-12-01 PROCEDURE — 25000242 PHARM REV CODE 250 ALT 637 W/ HCPCS: Performed by: STUDENT IN AN ORGANIZED HEALTH CARE EDUCATION/TRAINING PROGRAM

## 2019-12-01 PROCEDURE — 80048 BASIC METABOLIC PNL TOTAL CA: CPT

## 2019-12-01 PROCEDURE — 84100 ASSAY OF PHOSPHORUS: CPT

## 2019-12-01 PROCEDURE — 27000221 HC OXYGEN, UP TO 24 HOURS

## 2019-12-01 PROCEDURE — 83735 ASSAY OF MAGNESIUM: CPT

## 2019-12-01 PROCEDURE — 63600175 PHARM REV CODE 636 W HCPCS: Performed by: STUDENT IN AN ORGANIZED HEALTH CARE EDUCATION/TRAINING PROGRAM

## 2019-12-01 PROCEDURE — 99900035 HC TECH TIME PER 15 MIN (STAT)

## 2019-12-01 PROCEDURE — 94640 AIRWAY INHALATION TREATMENT: CPT

## 2019-12-01 PROCEDURE — 85025 COMPLETE CBC W/AUTO DIFF WBC: CPT

## 2019-12-01 RX ORDER — AMLODIPINE BESYLATE 10 MG/1
10 TABLET ORAL DAILY
Qty: 30 TABLET | Refills: 3 | Status: SHIPPED | OUTPATIENT
Start: 2019-12-01 | End: 2020-01-03

## 2019-12-01 RX ORDER — FLUTICASONE FUROATE AND VILANTEROL 100; 25 UG/1; UG/1
1 POWDER RESPIRATORY (INHALATION) DAILY
Qty: 30 EACH | Refills: 11 | Status: SHIPPED | OUTPATIENT
Start: 2019-12-01 | End: 2019-12-01 | Stop reason: HOSPADM

## 2019-12-01 RX ORDER — TIOTROPIUM BROMIDE 18 UG/1
1 CAPSULE ORAL; RESPIRATORY (INHALATION) DAILY
Qty: 30 CAPSULE | Refills: 3 | Status: SHIPPED | OUTPATIENT
Start: 2019-12-01 | End: 2019-12-01 | Stop reason: HOSPADM

## 2019-12-01 RX ORDER — CALCIUM CARBONATE 200(500)MG
500 TABLET,CHEWABLE ORAL 3 TIMES DAILY PRN
COMMUNITY
Start: 2019-12-01 | End: 2019-12-01 | Stop reason: HOSPADM

## 2019-12-01 RX ORDER — FLUTICASONE PROPIONATE AND SALMETEROL 250; 50 UG/1; UG/1
1 POWDER RESPIRATORY (INHALATION) 2 TIMES DAILY
Qty: 720 EACH | Refills: 0 | Status: ON HOLD | OUTPATIENT
Start: 2019-12-01 | End: 2020-01-30 | Stop reason: SDUPTHER

## 2019-12-01 RX ORDER — AZITHROMYCIN 250 MG/1
250 TABLET, FILM COATED ORAL DAILY
Qty: 2 TABLET | Refills: 0 | Status: ON HOLD | OUTPATIENT
Start: 2019-12-01 | End: 2020-01-30 | Stop reason: HOSPADM

## 2019-12-01 RX ORDER — IBUPROFEN 200 MG
1 TABLET ORAL DAILY
Refills: 0 | Status: ON HOLD | COMMUNITY
Start: 2019-12-01 | End: 2020-01-30 | Stop reason: HOSPADM

## 2019-12-01 RX ORDER — TALC
6 POWDER (GRAM) TOPICAL NIGHTLY PRN
Refills: 0 | COMMUNITY
Start: 2019-12-01 | End: 2019-12-01 | Stop reason: HOSPADM

## 2019-12-01 RX ORDER — OMEPRAZOLE 20 MG/1
20 CAPSULE, DELAYED RELEASE ORAL DAILY
Qty: 30 CAPSULE | Refills: 1 | Status: ON HOLD | OUTPATIENT
Start: 2019-12-01 | End: 2020-03-16

## 2019-12-01 RX ORDER — LOSARTAN POTASSIUM AND HYDROCHLOROTHIAZIDE 12.5; 1 MG/1; MG/1
1 TABLET ORAL DAILY
Qty: 30 TABLET | Refills: 3 | Status: ON HOLD | OUTPATIENT
Start: 2019-12-01 | End: 2020-01-30 | Stop reason: HOSPADM

## 2019-12-01 RX ORDER — PREDNISONE 20 MG/1
40 TABLET ORAL DAILY
Qty: 6 TABLET | Refills: 0 | Status: SHIPPED | OUTPATIENT
Start: 2019-12-02 | End: 2019-12-05

## 2019-12-01 RX ORDER — ALBUTEROL SULFATE 90 UG/1
2 AEROSOL, METERED RESPIRATORY (INHALATION) EVERY 6 HOURS PRN
Qty: 18 G | Refills: 0 | Status: ON HOLD | OUTPATIENT
Start: 2019-12-01 | End: 2020-01-30 | Stop reason: SDUPTHER

## 2019-12-01 RX ORDER — FLUTICASONE PROPIONATE AND SALMETEROL 100; 50 UG/1; UG/1
1 POWDER RESPIRATORY (INHALATION) 2 TIMES DAILY
Qty: 720 EACH | Refills: 0 | Status: CANCELLED | OUTPATIENT
Start: 2019-12-01 | End: 2020-11-30

## 2019-12-01 RX ADMIN — POLYETHYLENE GLYCOL 3350 17 G: 17 POWDER, FOR SOLUTION ORAL at 08:12

## 2019-12-01 RX ADMIN — SENNOSIDES AND DOCUSATE SODIUM 1 TABLET: 8.6; 5 TABLET ORAL at 08:12

## 2019-12-01 RX ADMIN — IPRATROPIUM BROMIDE AND ALBUTEROL SULFATE 3 ML: .5; 3 SOLUTION RESPIRATORY (INHALATION) at 01:12

## 2019-12-01 RX ADMIN — PANTOPRAZOLE SODIUM 40 MG: 40 TABLET, DELAYED RELEASE ORAL at 08:12

## 2019-12-01 RX ADMIN — CALCIUM CARBONATE (ANTACID) CHEW TAB 500 MG 500 MG: 500 CHEW TAB at 08:12

## 2019-12-01 RX ADMIN — PREDNISONE 40 MG: 10 TABLET ORAL at 08:12

## 2019-12-01 RX ADMIN — LOSARTAN POTASSIUM AND HYDROCHLOROTHIAZIDE 1 TABLET: 100; 12.5 TABLET, FILM COATED ORAL at 08:12

## 2019-12-01 RX ADMIN — IPRATROPIUM BROMIDE AND ALBUTEROL SULFATE 3 ML: .5; 3 SOLUTION RESPIRATORY (INHALATION) at 07:12

## 2019-12-01 RX ADMIN — AMLODIPINE BESYLATE 10 MG: 10 TABLET ORAL at 08:12

## 2019-12-01 RX ADMIN — Medication 1 PATCH: at 08:12

## 2019-12-01 RX ADMIN — HEPARIN SODIUM 5000 UNITS: 5000 INJECTION, SOLUTION INTRAVENOUS; SUBCUTANEOUS at 05:12

## 2019-12-01 RX ADMIN — AZITHROMYCIN 250 MG: 250 TABLET, FILM COATED ORAL at 02:12

## 2019-12-01 NOTE — ASSESSMENT & PLAN NOTE
59 M with h/o COPD, asthma, HTN, and seizure disorder who presents with severe SOB x2 days after not refilling any medications because he was previously not feeling unwell. Satting in 70s on arrival which improved to 90s with 5L NC. ABG on arrival showed ph 7.32, CO2 70, O2 15. Received solumedrol 125 one time, , and triple therapy in ED.     - Duonebs [id number]  - home Breo   - home spiriva  - prednisone 40 mg x 5 days  - Azithro 500 mg 1 day followed by 250 for 4 days  - Patient counseled on importance of medication compliance despite feeling otherwise well  - Medically stable to d/c home

## 2019-12-01 NOTE — PLAN OF CARE
CM updated by [facility] that pt needs assistance w/ meds - pt is currently w/o insurance. CM updated by [facility] that med total is $240.04 - Wizela inhaler $148.09, albuterol inhaler 64.81 and oral meds 27.14. CM forwarded cost transfer to [facility] in [facility].

## 2019-12-01 NOTE — PHARMACY MED REC
"Admission Medication Reconciliation - Pharmacy Consult Note    The home medication history was taken by Tiera Cortes, Pharmacy Tech.     You may go to "Admission" then "Reconcile Home Medications" tabs to review and/or act upon these items.      No issues noted with the medication reconciliation.      Potential issues to be addressed PRIOR TO DISCHARGE  o Non-compliance: pt reports taking no medications at home so will need new Rx for everything appropriate    Franny Boateng, PharmD, BCPS  [id number]                  .    .            "

## 2019-12-01 NOTE — DISCHARGE INSTRUCTIONS
Please take the albuterol inhaler as needed and the fluticasone-salmeterol (Wixella) 1 puff twice a day. Please take prednisone 40 mg (2 tablets) daily for 3 more days (ending 12/04). Please take azithromycin 250 mg (1 tablet) for 3 more days (ending 12/04)

## 2019-12-01 NOTE — HOSPITAL COURSE
Pt at respiratory baseline. No dyspnea overnight and no wheezing on exam. Reiterated to patient the importance of medication compliance despite feeling well. Medically stable to discharge home.

## 2019-12-01 NOTE — SUBJECTIVE & OBJECTIVE
Past Medical History:   Diagnosis Date    Asthma     COPD (chronic obstructive pulmonary disease)     Hypertension     Pneumonia     Seizures     SOB (shortness of breath)        History reviewed. No pertinent surgical history.    Review of patient's allergies indicates:   Allergen Reactions    Benazepril Swelling    Duoneb [ipratropium-albuterol]      Report Tremors       No current facility-administered medications on file prior to encounter.      No current outpatient medications on file prior to encounter.     Family History     Problem Relation (Age of Onset)    Cancer Father    Hypertension Sister    Stroke Sister, Brother        Tobacco Use    Smoking status: Current Every Day Smoker     Packs/day: 0.25     Types: Cigarettes    Smokeless tobacco: Never Used    Tobacco comment: 1-2 cigs per day   Substance and Sexual Activity    Alcohol use: No    Drug use: No    Sexual activity: Not on file     Review of Systems   Constitutional: Negative for chills, diaphoresis and fever.   HENT: Negative for congestion, rhinorrhea and sore throat.    Eyes: Negative for visual disturbance.   Respiratory: Positive for shortness of breath and wheezing. Negative for cough.    Cardiovascular: Negative for chest pain.   Gastrointestinal: Positive for nausea. Negative for abdominal pain, blood in stool, constipation and diarrhea.   Genitourinary: Negative for dysuria and hematuria.   Musculoskeletal: Negative for myalgias.   Neurological: Positive for dizziness. Negative for headaches.   Psychiatric/Behavioral: Negative for agitation.     Objective:     Vital Signs (Most Recent):  Temp: 97.2 °F (36.2 °C) (12/01/19 0824)  Pulse: 71 (12/01/19 1300)  Resp: (!) 21 (12/01/19 1300)  BP: 137/80 (12/01/19 1300)  SpO2: (!) 93 % (12/01/19 1300) Vital Signs (24h Range):  Temp:  [96.4 °F (35.8 °C)-98.3 °F (36.8 °C)] 97.2 °F (36.2 °C)  Pulse:  [70-84] 71  Resp:  [16-21] 21  SpO2:  [91 %-96 %] 93 %  BP: (129-155)/(75-87) 137/80      Weight: 62.1 kg (137 lb)  Body mass index is 20.83 kg/m².    Physical Exam   Constitutional: He is oriented to person, place, and time. He appears well-developed and well-nourished. No distress.   HENT:   Head: Normocephalic and atraumatic.   Right Ear: External ear normal.   Left Ear: External ear normal.   Mouth/Throat: Oropharynx is clear and moist.   Eyes: Pupils are equal, round, and reactive to light. EOM are normal.   Neck: Normal range of motion.   Cardiovascular: Normal rate and regular rhythm.   Pulmonary/Chest: Effort normal. No respiratory distress. He has no wheezes (resolved). He has no rales.   Abdominal: Soft. Bowel sounds are normal. He exhibits no mass. There is no tenderness. There is no guarding.   Musculoskeletal: He exhibits no edema.   Neurological: He is alert and oriented to person, place, and time. No cranial nerve deficit.   Skin: Skin is warm and dry. He is not diaphoretic.         CRANIAL NERVES     CN III, IV, VI   Pupils are equal, round, and reactive to light.  Extraocular motions are normal.        Significant Labs: All pertinent labs within the past 24 hours have been reviewed.    Significant Imaging: I have reviewed all pertinent imaging results/findings within the past 24 hours.

## 2019-12-01 NOTE — DISCHARGE SUMMARY
[facility] Hospital Medicine  Discharge Summary      Patient Name: Baltazar Mccray  MRN: [id number]  Admission Date: 11/30/2019  Hospital Length of Stay: 1 days  Discharge Date and Time:  12/01/2019 2:01 PM  Attending Physician: Nirmala Cruz MD   Discharging Provider: Jeff Rhodes MD  Primary Care Provider: [facility] Medicine Team: [facility]OSP MED 4 Jeff Rhodes MD    HPI:   Mr Mccray is a 60 yo M with PMH of COPD, asthma, HTN, and seizure disorder (last seizure 3-4 yrs ago) presenting with severe shortness of breath. Pt reports that he began to feel dyspneic evening of 11/29 despite his usual 2 L of oxygen which he uses in the evenings after work. He reports feeling nauseous and unwell to the point of having to call in sick for work this AM. Patient has not been taking any of his prescribed medications lately; he has not refilled them because he was feeling well. Reports dizziness when feeling short of breath and getting up to walk around. On arrival to ED, pt was found to be satting 70s ORA which improved to low 90s on 5L O2 NC.  Given 125 mg solumedrol, started on duonebs, and given . Pt has come to the ED many times over the last several years for similar presentation. When asked about his seizure disorder, pt reports that last seizure was 3-4 years ago but has not been taking his meds as he has been asymptomatic. Per chart check, he was previously prescribed Keppra. He denies F/C, cough, sore throat, rhinorrhea, chest pain, abd pain, blood in stool, melena, urinary symptoms.   Pt smokes 1-2 cigarettes/day, reports 1 beer/day after work, and denies IVDU or other illicit drugs    * No surgery found *      Hospital Course:   Pt at respiratory baseline. No dyspnea overnight and no wheezing on exam. Reiterated to patient the importance of medication compliance despite feeling well. Patient does not have insurance at this time, will discharge with Wixella  (fluticasone salmterol) and prn albuterol with bedside delivery of medications for now. Medically stable to discharge home.        Physical Exam   Constitutional: He is oriented to person, place, and time. He appears well-developed and well-nourished. No distress.   HENT:   Head: Normocephalic and atraumatic.   Right Ear: External ear normal.   Left Ear: External ear normal.   Mouth/Throat: Oropharynx is clear and moist.   Eyes: Pupils are equal, round, and reactive to light. EOM are normal.   Neck: Normal range of motion.   Cardiovascular: Normal rate and regular rhythm.   Pulmonary/Chest: Effort normal. No respiratory distress. He has no wheezes (resolved). He has no rales.   Abdominal: Soft. Bowel sounds are normal. He exhibits no mass. There is no tenderness. There is no guarding.   Musculoskeletal: He exhibits no edema.   Neurological: He is alert and oriented to person, place, and time. No cranial nerve deficit.   Skin: Skin is warm and dry. He is not diaphoretic.   CRANIAL NERVES   CN III, IV, VI   Pupils are equal, round, and reactive to light.  Extraocular motions are normal.       Consults: N/A    * COPD exacerbation  59 M with h/o COPD, asthma, HTN, and seizure disorder who presents with severe SOB x2 days after not refilling any medications because he was previously not feeling unwell. Satting in 70s on arrival which improved to 90s with 5L NC. ABG on arrival showed ph 7.32, CO2 70, O2 15. Received solumedrol 125 one time, , and triple therapy in ED.     - Duonebs [id number]  - home Breo   - home spiriva  - prednisone 40 mg x 5 days  - Azithro 500 mg 1 day followed by 250 for 4 days  - Patient counseled on importance of medication compliance despite feeling otherwise well  - Medically stable to d/c home    Acute hypoxemic respiratory failure  See COPD exacerbation      Tobacco abuse  Pt counseled on effect of smoking on lung disease and risk of cancer    - Nicotine patch    Chronic respiratory  failure with hypoxia and hypercapnia  See COPD exacerbation      Elevated troponin  Initial Trop elevated at 0.094    - Additional troponin ordered      Hx of seizure disorder  Last seizure 3-4 years ago per patient. Previously on Keppra     Essential hypertension  Pt hypertensive 137/104 - 178/99 in ED    - restarting home losartan/hctz 100-12.5 and norvasc 10         Final Active Diagnoses:    Diagnosis Date Noted POA    PRINCIPAL PROBLEM:  COPD exacerbation [J44.1] 05/14/2017 Yes    Acute hypoxemic respiratory failure [J96.01] 10/03/2018 Yes    Tobacco abuse [Z72.0] 08/12/2018 Yes    Elevated troponin [R79.89] 08/12/2018 Yes    Chronic respiratory failure with hypoxia and hypercapnia [J96.11, J96.12] 08/12/2018 Yes    Hx of seizure disorder [Z86.69] 11/20/2017 Not Applicable     Chronic    Essential hypertension [I10] 05/19/2017 Yes      Problems Resolved During this Admission:       Discharged Condition: stable    Disposition: Home or Self Care    Follow Up:  Follow-up Information     [facility] week.    [facility] information:  [address] [facility] [facility] [id number] [phone]                 Patient Instructions:   No discharge procedures on file.    Significant Diagnostic Studies: Labs: All labs within the past 24 hours have been reviewed    Pending Diagnostic Studies:     None         Medications:  Reconciled Home Medications:      Medication List      START taking these medications    albuterol 90 mcg/actuation inhaler  Commonly known as:  PROVENTIL/VENTOLIN HFA  Inhale 2 puffs into the lungs every 6 (six) hours as needed for Wheezing. Rescue     azithromycin 250 MG tablet  Commonly known as:  Z-DIEGO  Take 1 tablet (250 mg total) by mouth once daily.     predniSONE 20 MG tablet  Commonly known as:  DELTASONE  Take 2 tablets (40 mg total) by mouth once daily. for 3 days  Start taking on:  December 2, 2019     Wixela Inhub 250-50 mcg/dose diskus inhaler  Generic drug:   fluticasone-salmeterol 250-50 mcg/dose  Inhale 1 puff into the lungs 2 (two) times daily. Controller        CONTINUE taking these medications    amLODIPine 10 MG tablet  Commonly known as:  NORVASC  Take 1 tablet (10 mg total) by mouth once daily. Contact [facility] for refills     losartan-hydrochlorothiazide 100-12.5 mg 100-12.5 mg Tab  Commonly known as:  HYZAAR  Take 1 tablet by mouth once daily. Contact [facility] for refills     nicotine 14 mg/24 hr  Commonly known as:  NICODERM CQ  Place 1 patch onto the skin once daily.     omeprazole 20 MG capsule  Commonly known as:  PRILOSEC  Take 1 capsule (20 mg total) by mouth once daily.        STOP taking these medications    tiotropium 18 mcg inhalation capsule  Commonly known as:  SPIRIVA            Indwelling Lines/Drains at time of discharge:   Lines/Drains/Airways     None                 Time spent on the discharge of patient: 30 minutes  Patient was seen and examined on the date of discharge and determined to be suitable for discharge.         Jeff Rhodes MD  Department of Hospital Medicine  [facility]

## 2019-12-01 NOTE — PLAN OF CARE
"No significant events or changes overnight, prn med given for "stomach pain" (Tums) with moderate relief noted, prn melatonin given and pt slept 3-4 hours overnight, minimal needs expressed, call light in reach, will continue to monitor  "

## 2020-01-03 ENCOUNTER — HOSPITAL ENCOUNTER (EMERGENCY)
Facility: HOSPITAL | Age: 60
Discharge: HOME | End: 2020-01-03
Attending: EMERGENCY MEDICINE
Payer: MEDICAID

## 2020-01-03 VITALS
HEART RATE: 93 BPM | OXYGEN SATURATION: 93 % | HEIGHT: 68 IN | WEIGHT: 137 LBS | BODY MASS INDEX: 20.76 KG/M2 | SYSTOLIC BLOOD PRESSURE: 116 MMHG | RESPIRATION RATE: 20 BRPM | DIASTOLIC BLOOD PRESSURE: 61 MMHG | TEMPERATURE: 98 F

## 2020-01-03 DIAGNOSIS — R06.00 DYSPNEA: ICD-10-CM

## 2020-01-03 DIAGNOSIS — J44.1 COPD WITH ACUTE EXACERBATION: Primary | ICD-10-CM

## 2020-01-03 DIAGNOSIS — R06.02 SHORTNESS OF BREATH: ICD-10-CM

## 2020-01-03 LAB
ALBUMIN SERPL BCP-MCNC: 3.3 G/DL (ref 3.5–5.2)
ALP SERPL-CCNC: 104 U/L (ref 55–135)
ALT SERPL W/O P-5'-P-CCNC: 75 U/L (ref 10–44)
ANION GAP SERPL CALC-SCNC: 13 MMOL/L (ref 8–16)
AST SERPL-CCNC: 87 U/L (ref 10–40)
BASOPHILS # BLD AUTO: 0.07 K/UL (ref 0–0.2)
BASOPHILS NFR BLD: 1.1 % (ref 0–1.9)
BILIRUB SERPL-MCNC: 0.4 MG/DL (ref 0.1–1)
BNP SERPL-MCNC: 195 PG/ML (ref 0–99)
BUN SERPL-MCNC: 7 MG/DL (ref 6–20)
CALCIUM SERPL-MCNC: 8.6 MG/DL (ref 8.7–10.5)
CHLORIDE SERPL-SCNC: 100 MMOL/L (ref 95–110)
CO2 SERPL-SCNC: 28 MMOL/L (ref 23–29)
CREAT SERPL-MCNC: 0.9 MG/DL (ref 0.5–1.4)
DIFFERENTIAL METHOD: ABNORMAL
EOSINOPHIL # BLD AUTO: 0.1 K/UL (ref 0–0.5)
EOSINOPHIL NFR BLD: 1.1 % (ref 0–8)
ERYTHROCYTE [DISTWIDTH] IN BLOOD BY AUTOMATED COUNT: 20.7 % (ref 11.5–14.5)
EST. GFR  (AFRICAN AMERICAN): >60 ML/MIN/1.73 M^2
EST. GFR  (NON AFRICAN AMERICAN): >60 ML/MIN/1.73 M^2
GLUCOSE SERPL-MCNC: 98 MG/DL (ref 70–110)
HCT VFR BLD AUTO: 49.9 % (ref 40–54)
HGB BLD-MCNC: 15.5 G/DL (ref 14–18)
IMM GRANULOCYTES # BLD AUTO: 0.01 K/UL (ref 0–0.04)
IMM GRANULOCYTES NFR BLD AUTO: 0.2 % (ref 0–0.5)
INR PPP: 1 (ref 0.8–1.2)
LYMPHOCYTES # BLD AUTO: 2.5 K/UL (ref 1–4.8)
LYMPHOCYTES NFR BLD: 38.7 % (ref 18–48)
MCH RBC QN AUTO: 25 PG (ref 27–31)
MCHC RBC AUTO-ENTMCNC: 31.1 G/DL (ref 32–36)
MCV RBC AUTO: 81 FL (ref 82–98)
MONOCYTES # BLD AUTO: 0.6 K/UL (ref 0.3–1)
MONOCYTES NFR BLD: 9.7 % (ref 4–15)
NEUTROPHILS # BLD AUTO: 3.2 K/UL (ref 1.8–7.7)
NEUTROPHILS NFR BLD: 49.2 % (ref 38–73)
NRBC BLD-RTO: 0 /100 WBC
PLATELET # BLD AUTO: 189 K/UL (ref 150–350)
PMV BLD AUTO: 9.4 FL (ref 9.2–12.9)
POTASSIUM SERPL-SCNC: 4.3 MMOL/L (ref 3.5–5.1)
PROT SERPL-MCNC: 7.4 G/DL (ref 6–8.4)
PROTHROMBIN TIME: 10.6 SEC (ref 9–12.5)
RBC # BLD AUTO: 6.2 M/UL (ref 4.6–6.2)
SODIUM SERPL-SCNC: 141 MMOL/L (ref 136–145)
TROPONIN I SERPL DL<=0.01 NG/ML-MCNC: 0.05 NG/ML (ref 0–0.03)
WBC # BLD AUTO: 6.49 K/UL (ref 3.9–12.7)

## 2020-01-03 PROCEDURE — 93010 EKG 12-LEAD: ICD-10-PCS | Mod: ,,, | Performed by: INTERNAL MEDICINE

## 2020-01-03 PROCEDURE — 84484 ASSAY OF TROPONIN QUANT: CPT

## 2020-01-03 PROCEDURE — 85610 PROTHROMBIN TIME: CPT

## 2020-01-03 PROCEDURE — 99284 EMERGENCY DEPT VISIT MOD MDM: CPT | Mod: ,,, | Performed by: EMERGENCY MEDICINE

## 2020-01-03 PROCEDURE — 93005 ELECTROCARDIOGRAM TRACING: CPT

## 2020-01-03 PROCEDURE — 25000242 PHARM REV CODE 250 ALT 637 W/ HCPCS: Performed by: EMERGENCY MEDICINE

## 2020-01-03 PROCEDURE — 94640 AIRWAY INHALATION TREATMENT: CPT

## 2020-01-03 PROCEDURE — 63600175 PHARM REV CODE 636 W HCPCS: Performed by: EMERGENCY MEDICINE

## 2020-01-03 PROCEDURE — 80053 COMPREHEN METABOLIC PANEL: CPT

## 2020-01-03 PROCEDURE — 83880 ASSAY OF NATRIURETIC PEPTIDE: CPT

## 2020-01-03 PROCEDURE — 85025 COMPLETE CBC W/AUTO DIFF WBC: CPT

## 2020-01-03 PROCEDURE — 99285 EMERGENCY DEPT VISIT HI MDM: CPT | Mod: 25

## 2020-01-03 PROCEDURE — 99284 PR EMERGENCY DEPT VISIT,LEVEL IV: ICD-10-PCS | Mod: ,,, | Performed by: EMERGENCY MEDICINE

## 2020-01-03 PROCEDURE — 96374 THER/PROPH/DIAG INJ IV PUSH: CPT

## 2020-01-03 PROCEDURE — 93010 ELECTROCARDIOGRAM REPORT: CPT | Mod: ,,, | Performed by: INTERNAL MEDICINE

## 2020-01-03 RX ORDER — IPRATROPIUM BROMIDE AND ALBUTEROL SULFATE 2.5; .5 MG/3ML; MG/3ML
3 SOLUTION RESPIRATORY (INHALATION)
Status: COMPLETED | OUTPATIENT
Start: 2020-01-03 | End: 2020-01-03

## 2020-01-03 RX ORDER — METHYLPREDNISOLONE SOD SUCC 125 MG
125 VIAL (EA) INJECTION
Status: COMPLETED | OUTPATIENT
Start: 2020-01-03 | End: 2020-01-03

## 2020-01-03 RX ORDER — IPRATROPIUM BROMIDE AND ALBUTEROL SULFATE 2.5; .5 MG/3ML; MG/3ML
3 SOLUTION RESPIRATORY (INHALATION)
Status: ACTIVE | OUTPATIENT
Start: 2020-01-03 | End: 2020-01-03

## 2020-01-03 RX ORDER — PREDNISONE 20 MG/1
40 TABLET ORAL DAILY
Qty: 10 TABLET | Refills: 0 | Status: SHIPPED | OUTPATIENT
Start: 2020-01-03 | End: 2020-01-08

## 2020-01-03 RX ADMIN — IPRATROPIUM BROMIDE AND ALBUTEROL SULFATE 3 ML: .5; 3 SOLUTION RESPIRATORY (INHALATION) at 04:01

## 2020-01-03 RX ADMIN — IPRATROPIUM BROMIDE AND ALBUTEROL SULFATE 3 ML: .5; 3 SOLUTION RESPIRATORY (INHALATION) at 03:01

## 2020-01-03 RX ADMIN — METHYLPREDNISOLONE SODIUM SUCCINATE 125 MG: 125 INJECTION, POWDER, FOR SOLUTION INTRAMUSCULAR; INTRAVENOUS at 02:01

## 2020-01-03 NOTE — ED NOTES
Baltazar Mccray, a 59 y.o. male presents to the ED w/ complaint of SOB at work today, REES with low O2 sats per EMS, typically wears 2lpm O2 at home with history of COPD. Pt states that over the last week he has been feeling more SOB with the worst today. With the last week, pt reports nonproductive cough, chills, undocumented fever. EMS gave duoneb and solumedrol. Pt reports improved breathing symptoms but still REES.    Triage note:  Chief Complaint   Patient presents with    Shortness of Breath     arrived via [facility] from work with c/o SOB with hx of COPD, increased with exertion, has home O2 ordered PRN, intial SpO2 94% on RA, decreased to 70s with ambulation, increased back to >95% on 2L NC then was given duo neb, arrived to ED with duo neb in progress     Review of patient's allergies indicates:   Allergen Reactions    Benazepril Swelling    Duoneb [ipratropium-albuterol]      Report Tremors     Past Medical History:   Diagnosis Date    Asthma     COPD (chronic obstructive pulmonary disease)     Hypertension     Pneumonia     Seizures     SOB (shortness of breath)

## 2020-01-03 NOTE — ED PROVIDER NOTES
Encounter Date: 1/3/2020       History     Chief Complaint   Patient presents with    Shortness of Breath     arrived via [facility] from work with c/o SOB with hx of COPD, increased with exertion, has home O2 ordered PRN, intial SpO2 94% on RA, decreased to 70s with ambulation, increased back to >95% on 2L NC then was given duo neb, arrived to ED with duo neb in progress     HPI     This is a 59-year-old male with a history of COPD on home oxygen at 2 L, who presents with EMS with report of worsening shortness of breath, generalized weakness, and it ambulatory sat of 70%.  He reports feeling poorly over the past couple of days, with generalized weakness, increasing shortness of breath despite his home oxygen.  This feels like prior COPD flares.  He denies fevers, does endorse some chills. He was given a DuoNeb with EMS and felt moderately improved.  Review of patient's allergies indicates:   Allergen Reactions    Benazepril Swelling    Duoneb [ipratropium-albuterol]      Report Tremors     Past Medical History:   Diagnosis Date    Asthma     COPD (chronic obstructive pulmonary disease)     Hypertension     Pneumonia     Seizures     SOB (shortness of breath)      History reviewed. No pertinent surgical history.  Family History   Problem Relation Age of Onset    Cancer Father     Hypertension Sister     Stroke Sister     Stroke Brother     Diabetes Neg Hx     Heart disease Neg Hx      Social History     Tobacco Use    Smoking status: Current Every Day Smoker     Packs/day: 0.25     Types: Cigarettes    Smokeless tobacco: Never Used    Tobacco comment: 1-2 cigs per day   Substance Use Topics    Alcohol use: No    Drug use: No     Review of Systems   Constitutional: Negative for chills, diaphoresis, fatigue and fever.   HENT: Negative for congestion, rhinorrhea and sore throat.    Eyes: Negative for visual disturbance.   Respiratory: Positive for shortness of breath and wheezing. Negative for cough and  chest tightness.    Cardiovascular: Negative for chest pain.   Gastrointestinal: Negative for abdominal pain, blood in stool, constipation, diarrhea and vomiting.   Genitourinary: Negative for dysuria, hematuria and urgency.   Musculoskeletal: Negative for back pain.   Skin: Negative for rash.   Neurological: Positive for weakness. Negative for seizures and syncope.   Hematological: Does not bruise/bleed easily.   Psychiatric/Behavioral: Negative for agitation and hallucinations.       Physical Exam     Initial Vitals   BP Pulse Resp Temp SpO2   01/03/20 1358 01/03/20 1358 01/03/20 1358 01/03/20 1639 01/03/20 1358   (!) 180/100 88 (!) 22 98.2 °F (36.8 °C) 100 %      MAP       --                Physical Exam  Gen: AxOx3, NAD, well nourished  Eye: sclera anicteric, EOMI, no conjunctivitis, no periorbital edema  ENT: NCAT, OP clear, neck supple with FROM, no stridor  CVS: RRR, no m/r/g, distal pulses intact/symmetric  Pulm:  Diminished breath sounds in all lung fields, with scattered expiratory wheeze, otherwise no rales or rhonchi, no increased work of breathing  Abd: soft, nontender, nondistended, no organomegaly, no CVAT  Ext: no edema, lesions, rashes, or deformity  Neuro: GCS15, moving all extremities, gait intact  Psych: normal affect, cooperative  ED Course   Procedures  Labs Reviewed   CBC W/ AUTO DIFFERENTIAL - Abnormal; Notable for the following components:       Result Value    Mean Corpuscular Volume 81 (*)     Mean Corpuscular Hemoglobin 25.0 (*)     Mean Corpuscular Hemoglobin Conc 31.1 (*)     RDW 20.7 (*)     All other components within normal limits   COMPREHENSIVE METABOLIC PANEL - Abnormal; Notable for the following components:    Calcium 8.6 (*)     Albumin 3.3 (*)     AST 87 (*)     ALT 75 (*)     All other components within normal limits   TROPONIN I - Abnormal; Notable for the following components:    Troponin I 0.047 (*)     All other components within normal limits   B-TYPE NATRIURETIC PEPTIDE  - Abnormal; Notable for the following components:     (*)     All other components within normal limits   PROTIME-INR        ECG Results          EKG 12-lead (Final result)  Result time 01/03/20 16:52:06    Final result by Interface, Lab In [facility] (01/03/20 16:52:06)                 Narrative:    Test Reason : R06.02,    Vent. Rate : 079 BPM     Atrial Rate : 079 BPM     P-R Int : 176 ms          QRS Dur : 086 ms      QT Int : 388 ms       P-R-T Axes : 082 083 074 degrees     QTc Int : 444 ms    Normal sinus rhythm  Right atrial enlargement  T wave abnormality, consider anterior ischemia  Abnormal ECG  When compared with ECG of 30-NOV-2019 12:40,  No significant change was found  Confirmed by MICHAEL CARRERA MD ([id number]) on 1/3/2020 4:51:53 PM    Referred By:             Confirmed By:MICHAEL CARRERA MD                            Imaging Results          X-Ray Chest PA And Lateral (Final result)  Result time 01/03/20 15:13:42    Final result by Naeem Choi MD (01/03/20 15:13:42)                 Impression:      See above      Electronically signed by: Naeem Choi MD  Date:    01/03/2020  Time:    15:13             Narrative:    EXAMINATION:  XR CHEST PA AND LATERAL    CLINICAL HISTORY:  Dyspnea, unspecified    TECHNIQUE:  PA and lateral views of the chest were performed.    COMPARISON:  N 11/30/2019 and 11/24/2018    FINDINGS:  Heart size normal.  Small nodular densities at the lung bases probably nipple shadows and remain unchanged as compared to the previous study.  No significant airspace consolidation or pleural effusion identified                                 Medical Decision Making:   Initial Assessment:   This is a 59-year-old male with a history of COPD on home oxygen who presents with worsening shortness of breath, feeling fatigued and was noted to be hypoxic with ambulation.  His exam is most consistent with a COPD exacerbation, though this could be precipitated by pneumonia versus could be masking  CHF or ACS.  He is grossly euvolemic on exam so I have overall very low suspicion for CHF.  Plan for chest x-ray, nebs, steroids, and close reassessment.  Clinical Tests:   Lab Tests: Ordered and Reviewed  Radiological Study: Ordered and Reviewed  ED Management:  Labs are reassuring.  Chest x-ray by my independent interpretation does not show any obvious pneumonia.  The patient felt completely improved after 3 nebs.  Plan for an outpatient burst of steroids, continued nebs at home, and patient was discharged in good condition.                                 Clinical Impression:       ICD-10-CM ICD-9-CM   1. COPD with acute exacerbation J44.1 491.21   2. Shortness of breath R06.02 786.05   3. Dyspnea R06.00 786.09                             Viktoriya Hui MD  01/03/20 [id number]

## 2020-01-29 ENCOUNTER — HOSPITAL ENCOUNTER (INPATIENT)
Facility: HOSPITAL | Age: 60
LOS: 2 days | Discharge: HOME | DRG: 190 | End: 2020-01-31
Attending: EMERGENCY MEDICINE | Admitting: HOSPITALIST
Payer: MEDICAID

## 2020-01-29 DIAGNOSIS — R07.9 CHEST PAIN: ICD-10-CM

## 2020-01-29 DIAGNOSIS — J96.11 CHRONIC RESPIRATORY FAILURE WITH HYPOXIA AND HYPERCAPNIA: ICD-10-CM

## 2020-01-29 DIAGNOSIS — J96.00 ACUTE RESPIRATORY FAILURE: ICD-10-CM

## 2020-01-29 DIAGNOSIS — J96.12 CHRONIC RESPIRATORY FAILURE WITH HYPOXIA AND HYPERCAPNIA: ICD-10-CM

## 2020-01-29 DIAGNOSIS — J96.21 ACUTE ON CHRONIC RESPIRATORY FAILURE WITH HYPOXIA AND HYPERCAPNIA: ICD-10-CM

## 2020-01-29 DIAGNOSIS — R06.02 SOB (SHORTNESS OF BREATH): ICD-10-CM

## 2020-01-29 DIAGNOSIS — J44.1 COPD EXACERBATION: Primary | ICD-10-CM

## 2020-01-29 DIAGNOSIS — J96.22 ACUTE ON CHRONIC RESPIRATORY FAILURE WITH HYPOXIA AND HYPERCAPNIA: ICD-10-CM

## 2020-01-29 DIAGNOSIS — J96.01 ACUTE HYPOXEMIC RESPIRATORY FAILURE: ICD-10-CM

## 2020-01-29 PROBLEM — R74.01 TRANSAMINITIS: Status: ACTIVE | Noted: 2020-01-29

## 2020-01-29 PROBLEM — Z75.8 DISCHARGE PLANNING ISSUES: Status: ACTIVE | Noted: 2020-01-29

## 2020-01-29 LAB
ADENOVIRUS: NOT DETECTED
ALBUMIN SERPL BCP-MCNC: 3.4 G/DL (ref 3.5–5.2)
ALLENS TEST: ABNORMAL
ALLENS TEST: ABNORMAL
ALP SERPL-CCNC: 127 U/L (ref 55–135)
ALT SERPL W/O P-5'-P-CCNC: 104 U/L (ref 10–44)
ANION GAP SERPL CALC-SCNC: 16 MMOL/L (ref 8–16)
AST SERPL-CCNC: 157 U/L (ref 10–40)
BASOPHILS # BLD AUTO: 0.08 K/UL (ref 0–0.2)
BASOPHILS NFR BLD: 1.4 % (ref 0–1.9)
BILIRUB SERPL-MCNC: 0.5 MG/DL (ref 0.1–1)
BNP SERPL-MCNC: 29 PG/ML (ref 0–99)
BORDETELLA PARAPERTUSSIS (IS1001): NOT DETECTED
BORDETELLA PERTUSSIS (PTXP): NOT DETECTED
BUN SERPL-MCNC: 7 MG/DL (ref 6–30)
BUN SERPL-MCNC: 8 MG/DL (ref 6–20)
CALCIUM SERPL-MCNC: 8.4 MG/DL (ref 8.7–10.5)
CHLAMYDIA PNEUMONIAE: NOT DETECTED
CHLORIDE SERPL-SCNC: 95 MMOL/L (ref 95–110)
CHLORIDE SERPL-SCNC: 97 MMOL/L (ref 95–110)
CO2 SERPL-SCNC: 26 MMOL/L (ref 23–29)
CORONAVIRUS 229E, COMMON COLD VIRUS: NOT DETECTED
CORONAVIRUS HKU1, COMMON COLD VIRUS: NOT DETECTED
CORONAVIRUS NL63, COMMON COLD VIRUS: NOT DETECTED
CORONAVIRUS OC43, COMMON COLD VIRUS: NOT DETECTED
CREAT SERPL-MCNC: 0.8 MG/DL (ref 0.5–1.4)
CREAT SERPL-MCNC: 1.1 MG/DL (ref 0.5–1.4)
DIFFERENTIAL METHOD: ABNORMAL
EOSINOPHIL # BLD AUTO: 0.2 K/UL (ref 0–0.5)
EOSINOPHIL NFR BLD: 3.6 % (ref 0–8)
ERYTHROCYTE [DISTWIDTH] IN BLOOD BY AUTOMATED COUNT: 21.2 % (ref 11.5–14.5)
EST. GFR  (AFRICAN AMERICAN): >60 ML/MIN/1.73 M^2
EST. GFR  (NON AFRICAN AMERICAN): >60 ML/MIN/1.73 M^2
FLUBV RNA NPH QL NAA+NON-PROBE: NOT DETECTED
GLUCOSE SERPL-MCNC: 105 MG/DL (ref 70–110)
GLUCOSE SERPL-MCNC: 95 MG/DL (ref 70–110)
HCO3 UR-SCNC: 32 MMOL/L (ref 24–28)
HCO3 UR-SCNC: 33.6 MMOL/L (ref 24–28)
HCT VFR BLD AUTO: 51 % (ref 40–54)
HCT VFR BLD CALC: 51 %PCV (ref 36–54)
HGB BLD-MCNC: 16.3 G/DL (ref 14–18)
HPIV1 RNA NPH QL NAA+NON-PROBE: NOT DETECTED
HPIV2 RNA NPH QL NAA+NON-PROBE: NOT DETECTED
HPIV3 RNA NPH QL NAA+NON-PROBE: NOT DETECTED
HPIV4 RNA NPH QL NAA+NON-PROBE: NOT DETECTED
HUMAN METAPNEUMOVIRUS: NOT DETECTED
IMM GRANULOCYTES # BLD AUTO: 0.01 K/UL (ref 0–0.04)
IMM GRANULOCYTES NFR BLD AUTO: 0.2 % (ref 0–0.5)
INFLUENZA A (SUBTYPES H1,H1-2009,H3): NOT DETECTED
INFLUENZA A, MOLECULAR: NEGATIVE
INFLUENZA B, MOLECULAR: NEGATIVE
LYMPHOCYTES # BLD AUTO: 2.8 K/UL (ref 1–4.8)
LYMPHOCYTES NFR BLD: 49.5 % (ref 18–48)
MCH RBC QN AUTO: 25.5 PG (ref 27–31)
MCHC RBC AUTO-ENTMCNC: 32 G/DL (ref 32–36)
MCV RBC AUTO: 80 FL (ref 82–98)
MONOCYTES # BLD AUTO: 0.6 K/UL (ref 0.3–1)
MONOCYTES NFR BLD: 11.2 % (ref 4–15)
MYCOPLASMA PNEUMONIAE: NOT DETECTED
NEUTROPHILS # BLD AUTO: 1.9 K/UL (ref 1.8–7.7)
NEUTROPHILS NFR BLD: 34.1 % (ref 38–73)
NRBC BLD-RTO: 0 /100 WBC
PCO2 BLDA: 69.2 MMHG (ref 35–45)
PCO2 BLDA: 69.4 MMHG (ref 35–45)
PH SMN: 7.27 [PH] (ref 7.35–7.45)
PH SMN: 7.29 [PH] (ref 7.35–7.45)
PLATELET # BLD AUTO: 257 K/UL (ref 150–350)
PMV BLD AUTO: 9.4 FL (ref 9.2–12.9)
PO2 BLDA: 21 MMHG (ref 40–60)
PO2 BLDA: 36 MMHG (ref 40–60)
POC BE: 5 MMOL/L
POC BE: 7 MMOL/L
POC IONIZED CALCIUM: 0.96 MMOL/L (ref 1.06–1.42)
POC SATURATED O2: 28 % (ref 95–100)
POC SATURATED O2: 59 % (ref 95–100)
POC TCO2 (MEASURED): 28 MMOL/L (ref 23–29)
POC TCO2: 34 MMOL/L (ref 24–29)
POC TCO2: 36 MMOL/L (ref 24–29)
POTASSIUM BLD-SCNC: 3.8 MMOL/L (ref 3.5–5.1)
POTASSIUM SERPL-SCNC: 4 MMOL/L (ref 3.5–5.1)
PROT SERPL-MCNC: 7.1 G/DL (ref 6–8.4)
RBC # BLD AUTO: 6.39 M/UL (ref 4.6–6.2)
RESPIRATORY INFECTION PANEL SOURCE: NORMAL
RSV RNA NPH QL NAA+NON-PROBE: NOT DETECTED
RV+EV RNA NPH QL NAA+NON-PROBE: NOT DETECTED
SAMPLE: ABNORMAL
SITE: ABNORMAL
SITE: ABNORMAL
SODIUM BLD-SCNC: 134 MMOL/L (ref 136–145)
SODIUM SERPL-SCNC: 137 MMOL/L (ref 136–145)
SPECIMEN SOURCE: NORMAL
TROPONIN I SERPL DL<=0.01 NG/ML-MCNC: 0.03 NG/ML (ref 0–0.03)
TROPONIN I SERPL DL<=0.01 NG/ML-MCNC: 0.04 NG/ML (ref 0–0.03)
WBC # BLD AUTO: 5.62 K/UL (ref 3.9–12.7)

## 2020-01-29 PROCEDURE — 99223 PR INITIAL HOSPITAL CARE,LEVL III: ICD-10-PCS | Mod: ,,, | Performed by: HOSPITALIST

## 2020-01-29 PROCEDURE — 83880 ASSAY OF NATRIURETIC PEPTIDE: CPT

## 2020-01-29 PROCEDURE — 93010 ELECTROCARDIOGRAM REPORT: CPT | Mod: ,,, | Performed by: INTERNAL MEDICINE

## 2020-01-29 PROCEDURE — 87502 INFLUENZA DNA AMP PROBE: CPT

## 2020-01-29 PROCEDURE — 96368 THER/DIAG CONCURRENT INF: CPT

## 2020-01-29 PROCEDURE — 27000221 HC OXYGEN, UP TO 24 HOURS

## 2020-01-29 PROCEDURE — 99291 PR CRITICAL CARE, E/M 30-74 MINUTES: ICD-10-PCS | Mod: ,,, | Performed by: EMERGENCY MEDICINE

## 2020-01-29 PROCEDURE — 25000242 PHARM REV CODE 250 ALT 637 W/ HCPCS: Performed by: STUDENT IN AN ORGANIZED HEALTH CARE EDUCATION/TRAINING PROGRAM

## 2020-01-29 PROCEDURE — 87798 DETECT AGENT NOS DNA AMP: CPT

## 2020-01-29 PROCEDURE — 96365 THER/PROPH/DIAG IV INF INIT: CPT

## 2020-01-29 PROCEDURE — 80047 BASIC METABLC PNL IONIZED CA: CPT

## 2020-01-29 PROCEDURE — 99291 CRITICAL CARE FIRST HOUR: CPT | Mod: ,,, | Performed by: EMERGENCY MEDICINE

## 2020-01-29 PROCEDURE — 85025 COMPLETE CBC W/AUTO DIFF WBC: CPT

## 2020-01-29 PROCEDURE — 36415 COLL VENOUS BLD VENIPUNCTURE: CPT

## 2020-01-29 PROCEDURE — 94660 CPAP INITIATION&MGMT: CPT

## 2020-01-29 PROCEDURE — 27000190 HC CPAP FULL FACE MASK W/VALVE

## 2020-01-29 PROCEDURE — 84484 ASSAY OF TROPONIN QUANT: CPT | Mod: 91

## 2020-01-29 PROCEDURE — 94761 N-INVAS EAR/PLS OXIMETRY MLT: CPT

## 2020-01-29 PROCEDURE — 63600175 PHARM REV CODE 636 W HCPCS: Performed by: EMERGENCY MEDICINE

## 2020-01-29 PROCEDURE — 84484 ASSAY OF TROPONIN QUANT: CPT

## 2020-01-29 PROCEDURE — 93010 EKG 12-LEAD: ICD-10-PCS | Mod: ,,, | Performed by: INTERNAL MEDICINE

## 2020-01-29 PROCEDURE — 82803 BLOOD GASES ANY COMBINATION: CPT

## 2020-01-29 PROCEDURE — 99223 1ST HOSP IP/OBS HIGH 75: CPT | Mod: ,,, | Performed by: HOSPITALIST

## 2020-01-29 PROCEDURE — 99900035 HC TECH TIME PER 15 MIN (STAT)

## 2020-01-29 PROCEDURE — 99291 CRITICAL CARE FIRST HOUR: CPT | Mod: 25

## 2020-01-29 PROCEDURE — 25000003 PHARM REV CODE 250: Performed by: STUDENT IN AN ORGANIZED HEALTH CARE EDUCATION/TRAINING PROGRAM

## 2020-01-29 PROCEDURE — 11000001 HC ACUTE MED/SURG PRIVATE ROOM

## 2020-01-29 PROCEDURE — 80053 COMPREHEN METABOLIC PANEL: CPT

## 2020-01-29 PROCEDURE — 96375 TX/PRO/DX INJ NEW DRUG ADDON: CPT

## 2020-01-29 PROCEDURE — 99285 EMERGENCY DEPT VISIT HI MDM: CPT | Mod: 25

## 2020-01-29 PROCEDURE — 94640 AIRWAY INHALATION TREATMENT: CPT

## 2020-01-29 PROCEDURE — 93005 ELECTROCARDIOGRAM TRACING: CPT

## 2020-01-29 PROCEDURE — 25000242 PHARM REV CODE 250 ALT 637 W/ HCPCS: Performed by: EMERGENCY MEDICINE

## 2020-01-29 RX ORDER — PREDNISONE 10 MG/1
40 TABLET ORAL DAILY
Status: DISCONTINUED | OUTPATIENT
Start: 2020-01-30 | End: 2020-01-31 | Stop reason: HOSPADM

## 2020-01-29 RX ORDER — THIAMINE HCL 100 MG
100 TABLET ORAL DAILY
Status: DISCONTINUED | OUTPATIENT
Start: 2020-01-30 | End: 2020-01-31 | Stop reason: HOSPADM

## 2020-01-29 RX ORDER — AMOXICILLIN 250 MG
1 CAPSULE ORAL 2 TIMES DAILY
Status: DISCONTINUED | OUTPATIENT
Start: 2020-01-29 | End: 2020-01-31 | Stop reason: HOSPADM

## 2020-01-29 RX ORDER — ACETAMINOPHEN 325 MG/1
650 TABLET ORAL EVERY 4 HOURS PRN
Status: DISCONTINUED | OUTPATIENT
Start: 2020-01-29 | End: 2020-01-31 | Stop reason: HOSPADM

## 2020-01-29 RX ORDER — ALBUTEROL SULFATE 2.5 MG/.5ML
7.5 SOLUTION RESPIRATORY (INHALATION)
Status: COMPLETED | OUTPATIENT
Start: 2020-01-29 | End: 2020-01-29

## 2020-01-29 RX ORDER — GLUCAGON 1 MG
1 KIT INJECTION
Status: DISCONTINUED | OUTPATIENT
Start: 2020-01-29 | End: 2020-01-31 | Stop reason: HOSPADM

## 2020-01-29 RX ORDER — IBUPROFEN 200 MG
24 TABLET ORAL
Status: DISCONTINUED | OUTPATIENT
Start: 2020-01-29 | End: 2020-01-31 | Stop reason: HOSPADM

## 2020-01-29 RX ORDER — METHYLPREDNISOLONE SOD SUCC 125 MG
125 VIAL (EA) INJECTION
Status: COMPLETED | OUTPATIENT
Start: 2020-01-29 | End: 2020-01-29

## 2020-01-29 RX ORDER — LOSARTAN POTASSIUM AND HYDROCHLOROTHIAZIDE 12.5; 1 MG/1; MG/1
1 TABLET ORAL DAILY
Status: DISCONTINUED | OUTPATIENT
Start: 2020-01-30 | End: 2020-01-31 | Stop reason: HOSPADM

## 2020-01-29 RX ORDER — SODIUM CHLORIDE 0.9 % (FLUSH) 0.9 %
10 SYRINGE (ML) INJECTION
Status: DISCONTINUED | OUTPATIENT
Start: 2020-01-29 | End: 2020-01-31 | Stop reason: HOSPADM

## 2020-01-29 RX ORDER — IBUPROFEN 200 MG
1 TABLET ORAL DAILY
Status: DISCONTINUED | OUTPATIENT
Start: 2020-01-30 | End: 2020-01-31 | Stop reason: HOSPADM

## 2020-01-29 RX ORDER — BISACODYL 10 MG
10 SUPPOSITORY, RECTAL RECTAL DAILY PRN
Status: DISCONTINUED | OUTPATIENT
Start: 2020-01-29 | End: 2020-01-31 | Stop reason: HOSPADM

## 2020-01-29 RX ORDER — IPRATROPIUM BROMIDE AND ALBUTEROL SULFATE 2.5; .5 MG/3ML; MG/3ML
3 SOLUTION RESPIRATORY (INHALATION)
Status: COMPLETED | OUTPATIENT
Start: 2020-01-29 | End: 2020-01-29

## 2020-01-29 RX ORDER — PANTOPRAZOLE SODIUM 40 MG/1
40 TABLET, DELAYED RELEASE ORAL DAILY
Status: DISCONTINUED | OUTPATIENT
Start: 2020-01-30 | End: 2020-01-31 | Stop reason: HOSPADM

## 2020-01-29 RX ORDER — LORAZEPAM 1 MG/1
2 TABLET ORAL EVERY 4 HOURS PRN
Status: DISCONTINUED | OUTPATIENT
Start: 2020-01-29 | End: 2020-01-31 | Stop reason: HOSPADM

## 2020-01-29 RX ORDER — FOLIC ACID 1 MG/1
1 TABLET ORAL DAILY
Status: DISCONTINUED | OUTPATIENT
Start: 2020-01-30 | End: 2020-01-31 | Stop reason: HOSPADM

## 2020-01-29 RX ORDER — ONDANSETRON 8 MG/1
8 TABLET, ORALLY DISINTEGRATING ORAL EVERY 8 HOURS PRN
Status: DISCONTINUED | OUTPATIENT
Start: 2020-01-29 | End: 2020-01-31 | Stop reason: HOSPADM

## 2020-01-29 RX ORDER — ENOXAPARIN SODIUM 100 MG/ML
40 INJECTION SUBCUTANEOUS EVERY 24 HOURS
Status: DISCONTINUED | OUTPATIENT
Start: 2020-01-30 | End: 2020-01-31 | Stop reason: HOSPADM

## 2020-01-29 RX ORDER — FLUTICASONE FUROATE AND VILANTEROL 100; 25 UG/1; UG/1
1 POWDER RESPIRATORY (INHALATION) DAILY
Status: DISCONTINUED | OUTPATIENT
Start: 2020-01-30 | End: 2020-01-31 | Stop reason: HOSPADM

## 2020-01-29 RX ORDER — IBUPROFEN 200 MG
16 TABLET ORAL
Status: DISCONTINUED | OUTPATIENT
Start: 2020-01-29 | End: 2020-01-31 | Stop reason: HOSPADM

## 2020-01-29 RX ORDER — IPRATROPIUM BROMIDE AND ALBUTEROL SULFATE 2.5; .5 MG/3ML; MG/3ML
3 SOLUTION RESPIRATORY (INHALATION)
Status: DISCONTINUED | OUTPATIENT
Start: 2020-01-29 | End: 2020-01-31 | Stop reason: HOSPADM

## 2020-01-29 RX ORDER — MAGNESIUM SULFATE HEPTAHYDRATE 40 MG/ML
2 INJECTION, SOLUTION INTRAVENOUS
Status: COMPLETED | OUTPATIENT
Start: 2020-01-29 | End: 2020-01-29

## 2020-01-29 RX ADMIN — AZITHROMYCIN MONOHYDRATE 500 MG: 500 INJECTION, POWDER, LYOPHILIZED, FOR SOLUTION INTRAVENOUS at 12:01

## 2020-01-29 RX ADMIN — IPRATROPIUM BROMIDE AND ALBUTEROL SULFATE 3 ML: .5; 3 SOLUTION RESPIRATORY (INHALATION) at 12:01

## 2020-01-29 RX ADMIN — IPRATROPIUM BROMIDE AND ALBUTEROL SULFATE 3 ML: .5; 3 SOLUTION RESPIRATORY (INHALATION) at 08:01

## 2020-01-29 RX ADMIN — ONDANSETRON 8 MG: 8 TABLET, ORALLY DISINTEGRATING ORAL at 06:01

## 2020-01-29 RX ADMIN — MAGNESIUM SULFATE IN WATER 2 G: 40 INJECTION, SOLUTION INTRAVENOUS at 12:01

## 2020-01-29 RX ADMIN — METHYLPREDNISOLONE SODIUM SUCCINATE 125 MG: 125 INJECTION, POWDER, FOR SOLUTION INTRAMUSCULAR; INTRAVENOUS at 12:01

## 2020-01-29 RX ADMIN — SENNOSIDES AND DOCUSATE SODIUM 1 TABLET: 8.6; 5 TABLET ORAL at 08:01

## 2020-01-29 RX ADMIN — ALUMINUM HYDROXIDE, MAGNESIUM HYDROXIDE, AND SIMETHICONE: 200; 200; 20 SUSPENSION ORAL at 08:01

## 2020-01-29 RX ADMIN — ALBUTEROL SULFATE 7.5 MG: 2.5 SOLUTION RESPIRATORY (INHALATION) at 02:01

## 2020-01-29 NOTE — SUBJECTIVE & OBJECTIVE
Past Medical History:   Diagnosis Date    Asthma     COPD (chronic obstructive pulmonary disease)     home O2 at night only    Hypertension     Pneumonia     Seizures        History reviewed. No pertinent surgical history.    Review of patient's allergies indicates:   Allergen Reactions    Benazepril Swelling    Duoneb [ipratropium-albuterol]      Report Tremors       No current facility-administered medications on file prior to encounter.      Current Outpatient Medications on File Prior to Encounter   Medication Sig    albuterol (PROVENTIL HFA) 90 mcg/actuation inhaler Inhale 2 puffs into the lungs every 6 (six) hours as needed for Wheezing. Rescue    azithromycin (Z-DIEGO) 250 MG tablet Take 1 tablet (250 mg total) by mouth once daily.    fluticasone-salmeterol diskus inhaler 250-50 mcg Inhale 1 puff into the lungs 2 (two) times daily. Controller    losartan-hydrochlorothiazide 100-12.5 mg (HYZAAR) 100-12.5 mg Tab Take 1 tablet by mouth once daily. Contact [facility] for refills    nicotine (NICODERM CQ) 14 mg/24 hr Place 1 patch onto the skin once daily.    omeprazole (PRILOSEC) 20 MG capsule Take 1 capsule (20 mg total) by mouth once daily.     Family History     Problem Relation (Age of Onset)    Cancer Father    Hypertension Sister    Stroke Sister, Brother        Tobacco Use    Smoking status: Current Every Day Smoker     Packs/day: 0.25     Types: Cigarettes    Smokeless tobacco: Never Used    Tobacco comment: 1-2 cigs per day   Substance and Sexual Activity    Alcohol use: No    Drug use: No    Sexual activity: Not on file     Review of Systems   Constitutional: Positive for appetite change, fatigue and unexpected weight change (10 lbs over the last 3-4 months). Negative for chills, diaphoresis and fever.   HENT: Negative for sore throat and trouble swallowing.    Eyes: Negative for photophobia and visual disturbance.   Respiratory: Positive for cough (non-productive) and  shortness of breath. Negative for choking, chest tightness and wheezing.    Cardiovascular: Negative for chest pain, palpitations and leg swelling.   Gastrointestinal: Positive for abdominal pain. Negative for abdominal distention, diarrhea, nausea and vomiting.   Genitourinary: Negative for difficulty urinating and dysuria.   Musculoskeletal: Negative for arthralgias and myalgias.   Skin: Negative for color change and pallor.   Neurological: Negative for weakness, light-headedness and headaches.   Psychiatric/Behavioral: Negative for agitation. The patient is not nervous/anxious.      Objective:     Vital Signs (Most Recent):  Pulse: 73 (01/29/20 1647)  Resp: 14 (01/29/20 1647)  BP: (!) 142/72 (01/29/20 1647)  SpO2: (!) 93 % (01/29/20 1647) Vital Signs (24h Range):  Pulse:  [] 73  Resp:  [14-32] 14  SpO2:  [78 %-100 %] 93 %  BP: (112-172)/(61-96) 142/72     Weight: 63.5 kg (140 lb)  Body mass index is 21.29 kg/m².    Physical Exam   Constitutional: He is oriented to person, place, and time. He appears cachectic. No distress.   HENT:   Head: Normocephalic and atraumatic.   Eyes: EOM are normal. No scleral icterus.   Neck: Neck supple. No JVD present.   Cardiovascular: Normal rate and regular rhythm.   Pulmonary/Chest: Tachypnea noted. He has decreased breath sounds in the right upper field, the right middle field, the right lower field, the left upper field, the left middle field and the left lower field. He has wheezes in the left middle field and the left lower field. He has no rales.   Abdominal: Soft. Bowel sounds are normal. He exhibits no distension and no mass. There is no tenderness. There is no guarding.   Musculoskeletal: Normal range of motion. He exhibits no edema or tenderness.   Neurological: He is alert and oriented to person, place, and time.   Skin: Skin is warm and dry. Capillary refill takes less than 2 seconds. No erythema. No pallor.   Psychiatric: He has a normal mood and affect. His  behavior is normal. Judgment and thought content normal.         CRANIAL NERVES     CN III, IV, VI   Extraocular motions are normal.        Significant Labs:   ABGs:   Recent Labs   Lab 01/29/20  1353   PH 7.273*   PCO2 69.4*   HCO3 32.0*   POCSATURATED 59*   BE 5     CBC:   Recent Labs   Lab 01/29/20  1219 01/29/20  1412   WBC 5.62  --    HGB 16.3  --    HCT 51.0 51     --      CMP:   Recent Labs   Lab 01/29/20  1407      K 4.0   CL 95   CO2 26   GLU 95   BUN 8   CREATININE 0.8   CALCIUM 8.4*   PROT 7.1   ALBUMIN 3.4*   BILITOT 0.5   ALKPHOS 127   *   *   ANIONGAP 16   EGFRNONAA >60.0     Troponin:   Recent Labs   Lab 01/29/20  1219   TROPONINI 0.045*       Significant Imaging: I have reviewed and interpreted all pertinent imaging results/findings within the past 24 hours.

## 2020-01-29 NOTE — ASSESSMENT & PLAN NOTE
60 y/o AAM with known medical issues of COPD on Home Oxygen 2L QHS, Asthma, Seizure disorder (last seizure was 3-4 years ago) presenting to the ED with 3 days of shortness of breath with a non-productive cough. No recent Sick contacts. He is afebrile with no leukocytosis  Was started on BIPAP in the ED and weaned to NC. VB36  CXR: No acute cardio-pulmonary process  Given 1X dose of solumedrol and IV azithromycin in the ED    PLAN:  -- Wean O2 to tolerance patient should be satting between 88-92%. PRN O2 ordered placed  -- Elio-nebs Q4H PRN  -- Incentive spirometry  -- Short course of prednisone 40 mg QD for 5 days  -- Continue home Advair 1 puff QD  -- Protonix while the patient is on steroids   -- F/U Flu and RVP

## 2020-01-29 NOTE — ASSESSMENT & PLAN NOTE
Patient is an everyday smoker with a ~30 pack history    PLAN:  -- encourage smoking cessation  -- started on nicotine patch while inpatient  -- patient would do well to get PPV 23 vaccine before discharge

## 2020-01-29 NOTE — LETTER
January 31, 2020         [address]  [facility] [address]  Phone: [phone]  Fax: [phone]       Patient: Baltazar Mccray   YOB: 1960  Date of Visit: 01/31/2020    To Whom It May Concern:    Andra Mccray  was at [facility] on 1/29/2020 -  01/31/2020. He may return to work on 2/1/2020 with no restrictions. If you have any questions or concerns, or if I can be of further assistance, please do not hesitate to contact me.    Sincerely,          Sana Blount, DO

## 2020-01-29 NOTE — HPI
Mr Mahendra is a 58 y/o AAM with known medical issues of COPD on Home Oxygen 2L QHS, Asthma, Seizure disorder (last seizure was 3-4 years ago) presenting to the ED with 3 days of shortness of breath with a non-productive cough. The patient reports that he feels that his home oxygen was not sufficient to cover his shortness of breath and he was becoming more dyspneic on exertion. Patient denies associated chest pain, palpitations, fever, chills, edema, light headedness. He denies being recently sick or around sick contacts. 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 The patient reports running out of his albuterol inhaler last week and LABA/ICS (Advair) inhaler today before coming to the ED. He was recently on a short course of oral steroids and azithromycin for a similar exacerbation he presented to the ED for on 1/3/20. The patient is not followed by a PCP and has not seen a pulmonologist with no recent PFTs on file. He reports having abdominal discomfort leading up to his admission with some black stools for 2 days that has now subsided. The patient reports having no BRBPR and no reflux symptoms, nausea, hematemesis, emesis, and diarrhea. He does report having a 10 lbs unintentional weight loss over the last 3-4 months due to not having as good of an appetite but denies other B-symptoms. The patient is an everyday smoker with a 30 pack year history and drinks 3 beers + 2 shot of liquor nightly. Last drink was 1/28/20     In the ED he was found to be satting in the high 70s low 80s on Room air. VBG had a pH of 7.27/69/36. Patient was placed on BIPAP and eventually weaned to NC satting comfortably between 88-92%. He is afebrile, no leukocytosis, CXR showed no acute cardiopulmonary process. He was found to have elevated LFTs  and . He was given a 1X dose of solumedrol and azithromycin and transferred to hospital medicine team 4 for  further work up and management.

## 2020-01-29 NOTE — H&P
[facility] Hospital Medicine  History & Physical    Patient Name: Baltazar Mccray  MRN: [id number]  Admission Date: 1/29/2020  Attending Physician: Rick Correia MD   Primary Care Provider: [facility] Medicine Team: [facility]OS[facility] MED 4 Sana Blount DO     Patient information was obtained from patient, past medical records and ER records.     Subjective:     Principal Problem:Acute on chronic respiratory failure with hypoxia and hypercapnia    Chief Complaint:   Chief Complaint   Patient presents with    Shortness of Breath     hx copd, machine not working        HPI: Mr Mccray is a 58 y/o AAM with known medical issues of COPD on Home Oxygen 2L QHS, Asthma, Seizure disorder (last seizure was 3-4 years ago) presenting to the ED with 3 days of shortness of breath with a non-productive cough. The patient reports that he feels that his home oxygen was not sufficient to cover his shortness of breath and he was becoming more dyspneic on exertion. Patient denies associated chest pain, palpitations, fever, chills, edema, light headedness. He denies being recently sick or around sick contacts. 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 The patient reports running out of his albuterol inhaler last week and LABA/ICS (Advair) inhaler today before coming to the ED. He was recently on a short course of oral steroids and azithromycin for a similar exacerbation he presented to the ED for on 1/3/20. The patient is not followed by a PCP and has not seen a pulmonologist with no recent PFTs on file. He reports having abdominal discomfort leading up to his admission with some black stools for 2 days that has now subsided. The patient reports having no BRBPR and no reflux symptoms, nausea, hematemesis, emesis, and diarrhea. He does report having a 10 lbs unintentional weight loss over the last 3-4 months due to not  having as good of an appetite but denies other B-symptoms. The patient is an everyday smoker with a 30 pack year history and drinks 3 beers + 2 short os liquor nightly. Last drink was 1/28/20     In the ED he was found to be satting in the high 70s low 80s on Room air. VBG had a pH of 7.27/69/36. Patient was placed on BIPAP and eventually weaned to NC satting comfortably between 88-92%. He is afebrile, no leukocytosis, CXR showed no acute cardiopulmonary process. He was found to have elevated LFTs  and . He was given a 1X dose of solumedrol and azithromycin and transferred to hospital medicine team 4 for further work up and management.    Past Medical History:   Diagnosis Date    Asthma     COPD (chronic obstructive pulmonary disease)     home O2 at night only    Hypertension     Pneumonia     Seizures        History reviewed. No pertinent surgical history.    Review of patient's allergies indicates:   Allergen Reactions    Benazepril Swelling    Duoneb [ipratropium-albuterol]      Report Tremors       No current facility-administered medications on file prior to encounter.      Current Outpatient Medications on File Prior to Encounter   Medication Sig    albuterol (PROVENTIL HFA) 90 mcg/actuation inhaler Inhale 2 puffs into the lungs every 6 (six) hours as needed for Wheezing. Rescue    azithromycin (Z-DIEGO) 250 MG tablet Take 1 tablet (250 mg total) by mouth once daily.    fluticasone-salmeterol diskus inhaler 250-50 mcg Inhale 1 puff into the lungs 2 (two) times daily. Controller    losartan-hydrochlorothiazide 100-12.5 mg (HYZAAR) 100-12.5 mg Tab Take 1 tablet by mouth once daily. Contact [facility] for refills    nicotine (NICODERM CQ) 14 mg/24 hr Place 1 patch onto the skin once daily.    omeprazole (PRILOSEC) 20 MG capsule Take 1 capsule (20 mg total) by mouth once daily.     Family History     Problem Relation (Age of Onset)    Cancer Father    Hypertension Sister     Stroke Sister, Brother        Tobacco Use    Smoking status: Current Every Day Smoker     Packs/day: 0.25     Types: Cigarettes    Smokeless tobacco: Never Used    Tobacco comment: 1-2 cigs per day   Substance and Sexual Activity    Alcohol use: No    Drug use: No    Sexual activity: Not on file     Review of Systems   Constitutional: Positive for appetite change, fatigue and unexpected weight change (10 lbs over the last 3-4 months). Negative for chills, diaphoresis and fever.   HENT: Negative for sore throat and trouble swallowing.    Eyes: Negative for photophobia and visual disturbance.   Respiratory: Positive for cough (non-productive) and shortness of breath. Negative for choking, chest tightness and wheezing.    Cardiovascular: Negative for chest pain, palpitations and leg swelling.   Gastrointestinal: Positive for abdominal pain. Negative for abdominal distention, diarrhea, nausea and vomiting.   Genitourinary: Negative for difficulty urinating and dysuria.   Musculoskeletal: Negative for arthralgias and myalgias.   Skin: Negative for color change and pallor.   Neurological: Negative for weakness, light-headedness and headaches.   Psychiatric/Behavioral: Negative for agitation. The patient is not nervous/anxious.      Objective:     Vital Signs (Most Recent):  Pulse: 73 (01/29/20 1647)  Resp: 14 (01/29/20 1647)  BP: (!) 142/72 (01/29/20 1647)  SpO2: (!) 93 % (01/29/20 1647) Vital Signs (24h Range):  Pulse:  [] 73  Resp:  [14-32] 14  SpO2:  [78 %-100 %] 93 %  BP: (112-172)/(61-96) 142/72     Weight: 63.5 kg (140 lb)  Body mass index is 21.29 kg/m².    Physical Exam   Constitutional: He is oriented to person, place, and time. He appears cachectic. No distress.   HENT:   Head: Normocephalic and atraumatic.   Eyes: EOM are normal. No scleral icterus.   Neck: Neck supple. No JVD present.   Cardiovascular: Normal rate and regular rhythm.   Pulmonary/Chest: Tachypnea noted. He has decreased breath  sounds in the right upper field, the right middle field, the right lower field, the left upper field, the left middle field and the left lower field. He has wheezes in the left middle field and the left lower field. He has no rales.   Abdominal: Soft. Bowel sounds are normal. He exhibits no distension and no mass. There is no tenderness. There is no guarding.   Musculoskeletal: Normal range of motion. He exhibits no edema or tenderness.   Neurological: He is alert and oriented to person, place, and time.   Skin: Skin is warm and dry. Capillary refill takes less than 2 seconds. No erythema. No pallor.   Psychiatric: He has a normal mood and affect. His behavior is normal. Judgment and thought content normal.         CRANIAL NERVES     CN III, IV, VI   Extraocular motions are normal.        Significant Labs:   ABGs:   Recent Labs   Lab 20  1353   PH 7.273*   PCO2 69.4*   HCO3 32.0*   POCSATURATED 59*   BE 5     CBC:   Recent Labs   Lab 20  1219 20  1412   WBC 5.62  --    HGB 16.3  --    HCT 51.0 51     --      CMP:   Recent Labs   Lab 20  1407      K 4.0   CL 95   CO2 26   GLU 95   BUN 8   CREATININE 0.8   CALCIUM 8.4*   PROT 7.1   ALBUMIN 3.4*   BILITOT 0.5   ALKPHOS 127   *   *   ANIONGAP 16   EGFRNONAA >60.0     Troponin:   Recent Labs   Lab 20  1219   TROPONINI 0.045*       Significant Imaging: I have reviewed and interpreted all pertinent imaging results/findings within the past 24 hours.    Assessment/Plan:     * Acute on chronic respiratory failure with hypoxia and hypercapnia  60 y/o AAM with known medical issues of COPD on Home Oxygen 2L QHS, Asthma, Seizure disorder (last seizure was 3-4 years ago) presenting to the ED with 3 days of shortness of breath with a non-productive cough. No recent Sick contacts. He is afebrile with no leukocytosis  Was started on BIPAP in the ED and weaned to NC. VB  CXR: No acute cardio-pulmonary process  Given  1X dose of solumedrol and IV azithromycin in the ED    PLAN:  -- Wean O2 to tolerance patient should be satting between 88-92%. PRN O2 ordered placed  -- Elio-nebs Q4H PRN  -- Incentive spirometry  -- Short course of prednisone 40 mg QD for 5 days  -- Continue home Advair 1 puff QD  -- Protonix while the patient is on steroids   -- F/U Flu and RVP       Transaminitis  Patient reports drinking 3 beers and 2 shots of liquor nightly and has been doing so for over a year.    PLAN:  -- patient placed on CIWA protocol due to being a daily drinker and having a history of seizures in the DT window  -- started patient on Thiamine 100 mg QD  -- Ordered RPR, HIV, and Hep panel        Discharge planning issues  Patient will need refills on all of his meds as he reports being out of his medications. He has required daughter of adelita in the past    PLAN:  -- PT/OT to evaluate and treat      Tobacco abuse  Patient is an everyday smoker with a ~30 pack history    PLAN:  -- encourage smoking cessation  -- started on nicotine patch while inpatient  -- patient would do well to get PPV 23 vaccine before discharge      Essential hypertension  PLAN:  -- Continue home Losartan-HCTZ   -- Monitor vital daily      COPD exacerbation  See Acute on Chronic Respiratory with hypoxia and hypercapnia      VTE Risk Mitigation (From admission, onward)         Ordered     enoxaparin injection 40 mg  Daily      01/29/20 1522     IP VTE HIGH RISK PATIENT  Once      01/29/20 1522                   Sana Blount DO  Department of Hospital Medicine   [facility]

## 2020-01-29 NOTE — ED TRIAGE NOTES
Baltazar Mccray, a 59 y.o. male presents to the ED w/ complaint of shortness of breath upon wakening this morning.  Patient wears oxygen at night only and woke up very short of breath.  Patient denies cough, or fevers at home.      Triage note:  Chief Complaint   Patient presents with    Shortness of Breath     hx copd, machine not working     Review of patient's allergies indicates:   Allergen Reactions    Benazepril Swelling    Duoneb [ipratropium-albuterol]      Report Tremors     Past Medical History:   Diagnosis Date    Asthma     COPD (chronic obstructive pulmonary disease)     Hypertension     Pneumonia     Seizures     SOB (shortness of breath)      LOC: Patient name and date of birth verified. The patient is awake, alert and aware of environment with an appropriate affect, the patient is oriented x 3 and speaking appropriately.   APPEARANCE: Patient resting comfortably, patient is clean and well groomed, patient's clothing is properly fastened.  SKIN: The skin is warm and dry, color consistent with ethnicity, patient has normal skin turgor and moist mucus membranes, skin intact, no breakdown or bruising noted.  MUSCULOSKELETAL: Patient moving all extremities well, no obvious swelling or deformities noted.   RESPIRATORY: Respirations are spontaneous, patient has an increased effort and increased rate, accessory muscle use noted. Tachypneic at 30, SOB  CARDIAC: Patient has a normal rate and rhythm, no periphreal edema noted, capillary refill < 3 seconds.  ABDOMEN: Soft and non tender to palpation, no distention noted. Bowel sounds present in all four quadrants.  NEUROLOGIC: Eyes open spontaneously, behavior appropriate to situation, follows commands, facial expression symmetrical, bilateral hand grasp equal and even, purposeful motor response noted, normal sensation in all extremities when touched with a finger.

## 2020-01-29 NOTE — ED NOTES
"Walked into pts room and oxygen sats noted to be 74% and not on bipap anymore. No acute distress noted, denies any current complaints. Pt reports "the doctor took me off cause I have a room upstairs." Pt placed on 4L NC, will continue to monitor.   "

## 2020-01-29 NOTE — ASSESSMENT & PLAN NOTE
Patient reports drinking 3 beers and 2 shots of liquor nightly and has been doing so for over a year.    PLAN:  -- patient placed on CIWA protocol due to being a daily drinker and having a history of seizures in the DT window  -- started patient on Thiamine 100 mg QD  -- Ordered RPR, HIV, and Hep panel

## 2020-01-29 NOTE — ED PROVIDER NOTES
Encounter Date: 1/29/2020       History     Chief Complaint   Patient presents with    Shortness of Breath     hx copd, machine not working     HPI   59-year-old man with past medical history of hypertension, seizures, COPD/asthma, coming in with several days of shortness of breath. He states symptoms are exacerbated by the fact that his oxygen machine stop working last night.  He is on home O2 at night only.  Is not currently complaining of chest pain.  Is not truly complaining of increased cough.  He says he ran out of his albuterol medications this morning.     No fevers, no abdominal pain, nausea, vomiting.    Review of patient's allergies indicates:   Allergen Reactions    Benazepril Swelling    Duoneb [ipratropium-albuterol]      Report Tremors     Past Medical History:   Diagnosis Date    Asthma     COPD (chronic obstructive pulmonary disease)     home O2 at night only    Hypertension     Pneumonia     Seizures      History reviewed. No pertinent surgical history.  Family History   Problem Relation Age of Onset    Cancer Father     Hypertension Sister     Stroke Sister     Stroke Brother     Diabetes Neg Hx     Heart disease Neg Hx      Social History     Tobacco Use    Smoking status: Current Every Day Smoker     Packs/day: 0.25     Types: Cigarettes    Smokeless tobacco: Never Used    Tobacco comment: 1-2 cigs per day   Substance Use Topics    Alcohol use: No    Drug use: No     Review of Systems  Constitutional:  No Fever  Eyes: No Vision Changes  ENT/Mouth: No sore throat, No rhinorrhea  Cardiovascular:  No Chest Pain  Respiratory:  No Cough, positive SOB  Gastrointestinal:  No Nausea, No Vomiting, No abdo pain.  Genitourinary:  No  pain  Musculoskeletal:  No Arthralgias, No Back Pain, No Neck Pain, No recent trauma.  Skin:  No skin Lesions  Neuro:  No Weakness, No Dizziness, No Headache        Physical Exam     Initial Vitals   BP Pulse Resp Temp SpO2   01/29/20 1204 01/29/20 1204  01/29/20 1204 01/29/20 1807 01/29/20 1204   (!) 150/65 108 (!) 32 97.8 °F (36.6 °C) (!) 85 %      MAP       --                Physical Exam   Physical Exam:  CONSTITUTIONAL: Well developed, well nourished, initially dyspneic, hypoxic  HENT: Normocephalic, atraumatic    EYES: Sclerae anicteric   NECK: Supple, no thyroid enlargement  CARDIOVASCULAR: Regular rate and rhythm without any murmurs, gallops, rubs. No LE swelling or TTP.   No lower extremity swelling or tenderness to palpation.   RESPIRATORY: Speaking in 4-6 word sentences.  Tachypneic with some increased work of breathing. Auscultation of the lungs revealed coarse breath sounds with your moderate air movement, no significant wheezing.   ABDOMEN: Soft and nontender, no masses, no rebound or guarding   NEUROLOGIC: Alert, interacting normally. No facial droop.   MSK: Moving all four extremities.  Skin: Warm and dry. No visible rash on exposed areas of skin.    Psych: Mood and affect normal.       ED Course   Procedures  Labs Reviewed   CBC W/ AUTO DIFFERENTIAL - Abnormal; Notable for the following components:       Result Value    RBC 6.39 (*)     Mean Corpuscular Volume 80 (*)     Mean Corpuscular Hemoglobin 25.5 (*)     RDW 21.2 (*)     Gran% 34.1 (*)     Lymph% 49.5 (*)     All other components within normal limits   TROPONIN I - Abnormal; Notable for the following components:    Troponin I 0.045 (*)     All other components within normal limits   COMPREHENSIVE METABOLIC PANEL - Abnormal; Notable for the following components:    Calcium 8.4 (*)     Albumin 3.4 (*)      (*)      (*)     All other components within normal limits   ISTAT PROCEDURE - Abnormal; Notable for the following components:    POC PH 7.294 (*)     POC PCO2 69.2 (*)     POC PO2 21 (*)     POC HCO3 33.6 (*)     POC SATURATED O2 28 (*)     POC TCO2 36 (*)     All other components within normal limits   ISTAT PROCEDURE - Abnormal; Notable for the following components:    POC PH  7.273 (*)     POC PCO2 69.4 (*)     POC PO2 36 (*)     POC HCO3 32.0 (*)     POC SATURATED O2 59 (*)     POC TCO2 34 (*)     All other components within normal limits   ISTAT PROCEDURE - Abnormal; Notable for the following components:    POC Sodium 134 (*)     POC Ionized Calcium 0.96 (*)     All other components within normal limits   INFLUENZA A & B BY MOLECULAR   RESPIRATORY INFECTION PANEL, NASOPHARYNGEAL   B-TYPE NATRIURETIC PEPTIDE   ISTAT CHEM8     EKG Readings: (Independently Interpreted)   Sinus at 79 with LVH no ischemic changes normal axis intervals not remarkable.     ECG Results          EKG 12-lead (Final result)  Result time 01/29/20 14:34:32    Final result by Interface, Lab In [facility] (01/29/20 14:34:32)                 Narrative:    Test Reason : R06.02,    Vent. Rate : 079 BPM     Atrial Rate : 079 BPM     P-R Int : 164 ms          QRS Dur : 084 ms      QT Int : 392 ms       P-R-T Axes : 087 081 079 degrees     QTc Int : 449 ms    Normal sinus rhythm  Right atrial enlargement  Minimal voltage criteria for LVH, may be normal variant  Septal infarct ,age undetermined  Abnormal ECG  When compared with ECG of 03-JAN-2020 14:35,  Septal infarct is now Present  Confirmed by Sol Kenny MD ([id number]) on 1/29/2020 2:34:23 PM    Referred By: System System           Confirmed By:Sol Kenny MD                            Imaging Results          X-Ray Chest AP Portable (Final result)  Result time 01/29/20 12:42:00    Final result by Napoleon Murphy III, MD (01/29/20 12:42:00)                 Impression:      No acute process seen.      Electronically signed by: Napoleon Murphy MD  Date:    01/29/2020  Time:    12:42             Narrative:    EXAMINATION:  XR CHEST AP PORTABLE    CLINICAL HISTORY:  SOB;    FINDINGS:  One view: Heart size is normal.  Lungs are clear.  The bones showed DJD.  Aortic plaque.                                 Medical Decision Making:   Initial Assessment:   59-year-old male  with past medical is noted coming in with shortness of breath.  In triage patient was hypoxic to the 50s/60s dyspneic.  Taken immediately to room 1.   Patient placed on nasal cannula with significant improvement in O2 sats.  History and exam is consistent with likely COPD exacerbation.  No significant signs of fluid overload.  Patient is not complaining of chest pain, further patient ran out of his albuterol.    Full set of labs including troponins and BNP.  Chest x-ray to look for pneumonia or any other structural abnormalities.  Will give steroids, nebs, magnesium, azithromycin for COPD exacerbation.  Will give trial BiPAP to help with breathing.    Given patient's severe hypoxia and the need for admission to hospital  ED Management:  Update:   Labs as noted. Not consistent with ACS/CHF at this time.  Pt improving cliniclly on bipap. Sats stable on low oxygen requirements.   VBG with PCO2 not changing.   Continuing to give nebs, and will keep on bipap. Pt's baseline CO2 likely to be somewhat elevated (50s?)  However, pt at this time is safe for medicine admission to stepdown.     Critical Care Time:     The high probability of sudden, clinically significant deterioration in the patient's condition required the highest level of my preparedness to intervene urgently.    Services included the following: chart data review, reviewing nursing notes and/or old charts, documentation time, consultant collaboration regarding findings and treatment options, medication orders and management, direct patient care, vital sign assessments and ordering, interpreting and reviewing diagnostic studies/lab tests.     I spent 45 minutes on total Critical Care time, which includes only time during which I was engaged in work directly related to the patient's care, as described above, whether at the bedside or elsewhere in the Emergency Department.  It did not include time spent on separately billable procedures nor did it include the  time spent by residents, students, nurses or physician assistants on this patient's care.    Critical Care was needed secondary to the following conditions: COPE Exacerbation, hypoxia, dyspnea.                                    Clinical Impression:       ICD-10-CM ICD-9-CM   1. COPD exacerbation J44.1 491.21   2. SOB (shortness of breath) R06.02 786.05   3. Acute respiratory failure J96.00 518.81   4. Chest pain R07.9 786.50                             Ari Clifton MD  01/29/20 1904

## 2020-01-29 NOTE — ED NOTES
Admit team at bedside, lowered oxygen to 2L NC. Stated pt ok with sats between 88-92%. Pt remains AAox3. No acute distress noted. Respirations even and unlabored. Denies any current complaints. Will continue to monitor.

## 2020-01-29 NOTE — ASSESSMENT & PLAN NOTE
Patient will need refills on all of his meds as he reports being out of his medications. He has required daughter of adelita in the past    PLAN:  -- PT/OT to evaluate and treat

## 2020-01-30 LAB
ALBUMIN SERPL BCP-MCNC: 3.4 G/DL (ref 3.5–5.2)
ALP SERPL-CCNC: 121 U/L (ref 55–135)
ALT SERPL W/O P-5'-P-CCNC: 87 U/L (ref 10–44)
ANION GAP SERPL CALC-SCNC: 9 MMOL/L (ref 8–16)
AST SERPL-CCNC: 90 U/L (ref 10–40)
BASOPHILS # BLD AUTO: 0 K/UL (ref 0–0.2)
BASOPHILS NFR BLD: 0 % (ref 0–1.9)
BILIRUB SERPL-MCNC: 0.6 MG/DL (ref 0.1–1)
BUN SERPL-MCNC: 10 MG/DL (ref 6–20)
CALCIUM SERPL-MCNC: 8.5 MG/DL (ref 8.7–10.5)
CHLORIDE SERPL-SCNC: 93 MMOL/L (ref 95–110)
CO2 SERPL-SCNC: 32 MMOL/L (ref 23–29)
CREAT SERPL-MCNC: 0.9 MG/DL (ref 0.5–1.4)
DIFFERENTIAL METHOD: ABNORMAL
EOSINOPHIL # BLD AUTO: 0 K/UL (ref 0–0.5)
EOSINOPHIL NFR BLD: 0 % (ref 0–8)
ERYTHROCYTE [DISTWIDTH] IN BLOOD BY AUTOMATED COUNT: 19.9 % (ref 11.5–14.5)
EST. GFR  (AFRICAN AMERICAN): >60 ML/MIN/1.73 M^2
EST. GFR  (NON AFRICAN AMERICAN): >60 ML/MIN/1.73 M^2
GLUCOSE SERPL-MCNC: 195 MG/DL (ref 70–110)
HAV IGM SERPL QL IA: NEGATIVE
HBV CORE IGM SERPL QL IA: NEGATIVE
HBV SURFACE AG SERPL QL IA: NEGATIVE
HCT VFR BLD AUTO: 45.9 % (ref 40–54)
HCV AB SERPL QL IA: NEGATIVE
HGB BLD-MCNC: 15.1 G/DL (ref 14–18)
HIV 1+2 AB+HIV1 P24 AG SERPL QL IA: NEGATIVE
IMM GRANULOCYTES # BLD AUTO: 0.01 K/UL (ref 0–0.04)
IMM GRANULOCYTES NFR BLD AUTO: 0.4 % (ref 0–0.5)
LYMPHOCYTES # BLD AUTO: 0.3 K/UL (ref 1–4.8)
LYMPHOCYTES NFR BLD: 11.2 % (ref 18–48)
MAGNESIUM SERPL-MCNC: 2.2 MG/DL (ref 1.6–2.6)
MAGNESIUM SERPL-MCNC: 2.2 MG/DL (ref 1.6–2.6)
MCH RBC QN AUTO: 25.7 PG (ref 27–31)
MCHC RBC AUTO-ENTMCNC: 32.9 G/DL (ref 32–36)
MCV RBC AUTO: 78 FL (ref 82–98)
MONOCYTES # BLD AUTO: 0.1 K/UL (ref 0.3–1)
MONOCYTES NFR BLD: 3.1 % (ref 4–15)
NEUTROPHILS # BLD AUTO: 1.9 K/UL (ref 1.8–7.7)
NEUTROPHILS NFR BLD: 85.3 % (ref 38–73)
NRBC BLD-RTO: 0 /100 WBC
PHOSPHATE SERPL-MCNC: 2 MG/DL (ref 2.7–4.5)
PLATELET # BLD AUTO: 183 K/UL (ref 150–350)
PMV BLD AUTO: 9.7 FL (ref 9.2–12.9)
POTASSIUM SERPL-SCNC: 4.7 MMOL/L (ref 3.5–5.1)
POTASSIUM SERPL-SCNC: 4.7 MMOL/L (ref 3.5–5.1)
PROT SERPL-MCNC: 7.4 G/DL (ref 6–8.4)
RBC # BLD AUTO: 5.87 M/UL (ref 4.6–6.2)
RPR SER QL: NORMAL
SODIUM SERPL-SCNC: 134 MMOL/L (ref 136–145)
WBC # BLD AUTO: 2.24 K/UL (ref 3.9–12.7)

## 2020-01-30 PROCEDURE — 25000242 PHARM REV CODE 250 ALT 637 W/ HCPCS: Performed by: STUDENT IN AN ORGANIZED HEALTH CARE EDUCATION/TRAINING PROGRAM

## 2020-01-30 PROCEDURE — 11000001 HC ACUTE MED/SURG PRIVATE ROOM

## 2020-01-30 PROCEDURE — 97165 OT EVAL LOW COMPLEX 30 MIN: CPT

## 2020-01-30 PROCEDURE — 84132 ASSAY OF SERUM POTASSIUM: CPT

## 2020-01-30 PROCEDURE — 86703 HIV-1/HIV-2 1 RESULT ANTBDY: CPT

## 2020-01-30 PROCEDURE — 94761 N-INVAS EAR/PLS OXIMETRY MLT: CPT

## 2020-01-30 PROCEDURE — 97116 GAIT TRAINING THERAPY: CPT

## 2020-01-30 PROCEDURE — 63600175 PHARM REV CODE 636 W HCPCS: Performed by: STUDENT IN AN ORGANIZED HEALTH CARE EDUCATION/TRAINING PROGRAM

## 2020-01-30 PROCEDURE — 99232 PR SUBSEQUENT HOSPITAL CARE,LEVL II: ICD-10-PCS | Mod: ,,, | Performed by: HOSPITALIST

## 2020-01-30 PROCEDURE — 94640 AIRWAY INHALATION TREATMENT: CPT

## 2020-01-30 PROCEDURE — 99232 SBSQ HOSP IP/OBS MODERATE 35: CPT | Mod: ,,, | Performed by: HOSPITALIST

## 2020-01-30 PROCEDURE — 83735 ASSAY OF MAGNESIUM: CPT | Mod: 91

## 2020-01-30 PROCEDURE — 80074 ACUTE HEPATITIS PANEL: CPT

## 2020-01-30 PROCEDURE — 80053 COMPREHEN METABOLIC PANEL: CPT

## 2020-01-30 PROCEDURE — S4991 NICOTINE PATCH NONLEGEND: HCPCS | Performed by: STUDENT IN AN ORGANIZED HEALTH CARE EDUCATION/TRAINING PROGRAM

## 2020-01-30 PROCEDURE — 36415 COLL VENOUS BLD VENIPUNCTURE: CPT

## 2020-01-30 PROCEDURE — 25000003 PHARM REV CODE 250: Performed by: STUDENT IN AN ORGANIZED HEALTH CARE EDUCATION/TRAINING PROGRAM

## 2020-01-30 PROCEDURE — 99900035 HC TECH TIME PER 15 MIN (STAT)

## 2020-01-30 PROCEDURE — 86592 SYPHILIS TEST NON-TREP QUAL: CPT

## 2020-01-30 PROCEDURE — 84100 ASSAY OF PHOSPHORUS: CPT

## 2020-01-30 PROCEDURE — 97161 PT EVAL LOW COMPLEX 20 MIN: CPT

## 2020-01-30 PROCEDURE — 83735 ASSAY OF MAGNESIUM: CPT

## 2020-01-30 PROCEDURE — 85025 COMPLETE CBC W/AUTO DIFF WBC: CPT

## 2020-01-30 PROCEDURE — 27000221 HC OXYGEN, UP TO 24 HOURS

## 2020-01-30 RX ORDER — TIOTROPIUM BROMIDE 18 UG/1
1 CAPSULE ORAL; RESPIRATORY (INHALATION) DAILY
Status: DISCONTINUED | OUTPATIENT
Start: 2020-01-30 | End: 2020-01-31 | Stop reason: HOSPADM

## 2020-01-30 RX ORDER — CALCIUM CARBONATE 200(500)MG
500 TABLET,CHEWABLE ORAL 2 TIMES DAILY PRN
Status: DISCONTINUED | OUTPATIENT
Start: 2020-01-30 | End: 2020-01-31 | Stop reason: HOSPADM

## 2020-01-30 RX ORDER — TIOTROPIUM BROMIDE 18 UG/1
18 CAPSULE ORAL; RESPIRATORY (INHALATION) DAILY
Qty: 30 CAPSULE | Refills: 11 | Status: SHIPPED | OUTPATIENT
Start: 2020-01-30 | End: 2020-01-31 | Stop reason: SDUPTHER

## 2020-01-30 RX ORDER — FLUTICASONE PROPIONATE AND SALMETEROL 250; 50 UG/1; UG/1
1 POWDER RESPIRATORY (INHALATION) 2 TIMES DAILY
Qty: 60 EACH | Refills: 11 | Status: SHIPPED | OUTPATIENT
Start: 2020-01-30 | End: 2020-01-31 | Stop reason: SDUPTHER

## 2020-01-30 RX ORDER — INSULIN ASPART 100 [IU]/ML
0-5 INJECTION, SOLUTION INTRAVENOUS; SUBCUTANEOUS
Status: DISCONTINUED | OUTPATIENT
Start: 2020-01-30 | End: 2020-01-31 | Stop reason: HOSPADM

## 2020-01-30 RX ORDER — PREDNISONE 20 MG/1
40 TABLET ORAL DAILY
Qty: 8 TABLET | Refills: 0 | Status: SHIPPED | OUTPATIENT
Start: 2020-01-31 | End: 2020-02-04

## 2020-01-30 RX ORDER — LOSARTAN POTASSIUM AND HYDROCHLOROTHIAZIDE 12.5; 1 MG/1; MG/1
1 TABLET ORAL DAILY
Qty: 90 TABLET | Refills: 3 | Status: SHIPPED | OUTPATIENT
Start: 2020-01-30 | End: 2020-01-31 | Stop reason: SDUPTHER

## 2020-01-30 RX ORDER — ALBUTEROL SULFATE 90 UG/1
2 AEROSOL, METERED RESPIRATORY (INHALATION) EVERY 6 HOURS PRN
Qty: 18 G | Refills: 11 | Status: SHIPPED | OUTPATIENT
Start: 2020-01-30 | End: 2020-01-31 | Stop reason: SDUPTHER

## 2020-01-30 RX ADMIN — Medication 100 MG: at 09:01

## 2020-01-30 RX ADMIN — Medication 1 PATCH: at 09:01

## 2020-01-30 RX ADMIN — SENNOSIDES AND DOCUSATE SODIUM 1 TABLET: 8.6; 5 TABLET ORAL at 09:01

## 2020-01-30 RX ADMIN — THERA TABS 1 TABLET: TAB at 09:01

## 2020-01-30 RX ADMIN — ONDANSETRON 8 MG: 8 TABLET, ORALLY DISINTEGRATING ORAL at 08:01

## 2020-01-30 RX ADMIN — ALUMINUM HYDROXIDE, MAGNESIUM HYDROXIDE, AND SIMETHICONE: 200; 200; 20 SUSPENSION ORAL at 01:01

## 2020-01-30 RX ADMIN — SENNOSIDES AND DOCUSATE SODIUM 1 TABLET: 8.6; 5 TABLET ORAL at 08:01

## 2020-01-30 RX ADMIN — IPRATROPIUM BROMIDE AND ALBUTEROL SULFATE 3 ML: .5; 3 SOLUTION RESPIRATORY (INHALATION) at 02:01

## 2020-01-30 RX ADMIN — SODIUM PHOSPHATE, MONOBASIC, MONOHYDRATE 39.99 MMOL: 276; 142 INJECTION, SOLUTION INTRAVENOUS at 09:01

## 2020-01-30 RX ADMIN — CALCIUM CARBONATE (ANTACID) CHEW TAB 500 MG 500 MG: 500 CHEW TAB at 10:01

## 2020-01-30 RX ADMIN — PREDNISONE 40 MG: 10 TABLET ORAL at 09:01

## 2020-01-30 RX ADMIN — ACETAMINOPHEN 650 MG: 325 TABLET ORAL at 08:01

## 2020-01-30 RX ADMIN — PANTOPRAZOLE SODIUM 40 MG: 40 TABLET, DELAYED RELEASE ORAL at 09:01

## 2020-01-30 RX ADMIN — IPRATROPIUM BROMIDE AND ALBUTEROL SULFATE 3 ML: .5; 3 SOLUTION RESPIRATORY (INHALATION) at 07:01

## 2020-01-30 RX ADMIN — FOLIC ACID 1 MG: 1 TABLET ORAL at 10:01

## 2020-01-30 RX ADMIN — LOSARTAN POTASSIUM AND HYDROCHLOROTHIAZIDE 1 TABLET: 100; 12.5 TABLET, FILM COATED ORAL at 10:01

## 2020-01-30 RX ADMIN — TIOTROPIUM BROMIDE 18 MCG: 18 CAPSULE ORAL; RESPIRATORY (INHALATION) at 03:01

## 2020-01-30 RX ADMIN — FLUTICASONE FUROATE AND VILANTEROL TRIFENATATE 1 PUFF: 100; 25 POWDER RESPIRATORY (INHALATION) at 01:01

## 2020-01-30 RX ADMIN — LORAZEPAM 2 MG: 1 TABLET ORAL at 08:01

## 2020-01-30 RX ADMIN — ENOXAPARIN SODIUM 40 MG: 40 INJECTION SUBCUTANEOUS at 06:01

## 2020-01-30 NOTE — ASSESSMENT & PLAN NOTE
60 y/o AAM with known medical issues of COPD on Home Oxygen 2L QHS, Asthma, Seizure disorder (last seizure was 3-4 years ago) presenting to the ED with 3 days of shortness of breath with a non-productive cough. No recent Sick contacts. He is afebrile with no leukocytosis  Was started on BIPAP in the ED and weaned to NC. VB36  CXR: No acute cardio-pulmonary process  Given 1X dose of solumedrol and IV azithromycin in the ED    PLAN:  -- Wean O2 to tolerance patient should be satting between 88-92%. PRN O2 ordered placed  -- Elio-nebs Q4H PRN  -- Incentive spirometry  -- Short course of prednisone 40 mg QD for 5 days  -- Continue Advair 1 puff QD + Spiriva for LABA/LAMA/ICS  -- Protonix while the patient is on steroids   -- F/U Flu and RVP are negative  -- Patient will need a follow up with a PCP in 2 weeks to ensure he has proper medications at home and to help with follow up with pulmonary OP

## 2020-01-30 NOTE — PROGRESS NOTES
[facility] Hospital Medicine  Progress Note    Patient Name: Baltazar Mccray  MRN: [id number]  Patient Class: IP- Inpatient   Admission Date: 1/29/2020  Length of Stay: 1 days  Attending Physician: Rick Correia MD  Primary Care Provider: [facility] Medicine Team: [facility] 4 Sana Blount DO    Subjective:     Principal Problem:COPD exacerbation    HPI:  Mr Mccray is a 58 y/o AAM with known medical issues of COPD on Home Oxygen 2L QHS, Asthma, Seizure disorder (last seizure was 3-4 years ago) presenting to the ED with 3 days of shortness of breath with a non-productive cough. The patient reports that he feels that his home oxygen was not sufficient to cover his shortness of breath and he was becoming more dyspneic on exertion. Patient denies associated chest pain, palpitations, fever, chills, edema, light headedness. He denies being recently sick or around sick contacts. 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 The patient reports running out of his albuterol inhaler last week and LABA/ICS (Advair) inhaler today before coming to the ED. He was recently on a short course of oral steroids and azithromycin for a similar exacerbation he presented to the ED for on 1/3/20. The patient is not followed by a PCP and has not seen a pulmonologist with no recent PFTs on file. He reports having abdominal discomfort leading up to his admission with some black stools for 2 days that has now subsided. The patient reports having no BRBPR and no reflux symptoms, nausea, hematemesis, emesis, and diarrhea. He does report having a 10 lbs unintentional weight loss over the last 3-4 months due to not having as good of an appetite but denies other B-symptoms. The patient is an everyday smoker with a 30 pack year history and drinks 3 beers + 2 shot of liquor nightly. Last drink was 1/28/20     In the ED he was  found to be satting in the high 70s low 80s on Room air. VBG had a pH of 7.27/69/36. Patient was placed on BIPAP and eventually weaned to NC satting comfortably between 88-92%. He is afebrile, no leukocytosis, CXR showed no acute cardiopulmonary process. He was found to have elevated LFTs  and . He was given a 1X dose of solumedrol and azithromycin and transferred to hospital medicine team 4 for further work up and management.    Overview/Hospital Course:  Patient admitted for an acute on chronic respiratory failure with hypoxia and hypercapnia secondary to a COPD exacerbation. The patient improved with BIPAP and was later transitioned to NC satting between 88-92% and started on a short course of prednisone 40 mg for 5 days. The patient is in the transition of qualifying for medicaid and currently does not have home inhalers of PAULA and LABA/ICS as well as his Hyzaar for HTN. CM and pharmacy working on options to obtain medications that the patient can afford. Patient will need a follow up appointment with [facility] or [facility] for GI to get a screening colonoscopy and PCP follow up. He is currently medically stable and ready for discharge.    Interval History: ANITHAON. Patient admitted for an acute on chronic respiratory failure with hypoxia and hypercapnia secondary to a COPD exacerbation. The patient improved with BIPAP and was later transitioned to NC satting between 88-92% and started on a short course of prednisone 40 mg for 5 days. The patient is in the transition of qualifying for medicaid and currently does not have home inhalers of PAULA and LABA/ICS as well as his Hyzaar for HTN. CM and pharmacy working on options to obtain medications that the patient can afford. Patient will need a follow up appointment with [facility] or [facility] for GI to get a screening colonoscopy and PCP follow up     Review of Systems   Constitutional: Positive for appetite change, fatigue and unexpected  weight change (10 lbs over the last 3-4 months). Negative for chills, diaphoresis and fever.   HENT: Negative for sore throat and trouble swallowing.    Eyes: Negative for photophobia and visual disturbance.   Respiratory: Positive for cough (non-productive) and shortness of breath. Negative for choking, chest tightness and wheezing.    Cardiovascular: Negative for chest pain, palpitations and leg swelling.   Gastrointestinal: Positive for abdominal pain. Negative for abdominal distention, diarrhea, nausea and vomiting.   Genitourinary: Negative for difficulty urinating and dysuria.   Musculoskeletal: Negative for arthralgias and myalgias.   Skin: Negative for color change and pallor.   Neurological: Negative for weakness, light-headedness and headaches.   Psychiatric/Behavioral: Negative for agitation. The patient is not nervous/anxious.      Objective:     Vital Signs (Most Recent):  Temp: 98.2 °F (36.8 °C) (01/30/20 1227)  Pulse: 78 (01/30/20 1509)  Resp: 18 (01/30/20 1432)  BP: (!) 162/97 (01/30/20 1227)  SpO2: (!) 94 % (01/30/20 1432) Vital Signs (24h Range):  Temp:  [97.6 °F (36.4 °C)-98.4 °F (36.9 °C)] 98.2 °F (36.8 °C)  Pulse:  [54-94] 78  Resp:  [14-24] 18  SpO2:  [78 %-100 %] 94 %  BP: (112-190)/(63-97) 162/97     Weight: 55.3 kg (122 lb)  Body mass index is 18.55 kg/m².    Intake/Output Summary (Last 24 hours) at 1/30/2020 1526  Last data filed at 1/30/2020 0500  Gross per 24 hour   Intake 850 ml   Output 300 ml   Net 550 ml      Physical Exam   Constitutional: He is oriented to person, place, and time. He appears cachectic. No distress.   HENT:   Head: Normocephalic and atraumatic.   Eyes: EOM are normal. No scleral icterus.   Neck: Neck supple. No JVD present.   Cardiovascular: Normal rate and regular rhythm.   Pulmonary/Chest: No tachypnea. He has decreased breath sounds in the right upper field, the right middle field, the right lower field, the left upper field, the left middle field and the left  lower field. He has no wheezes. He has no rales.   Abdominal: Soft. Bowel sounds are normal. He exhibits no distension and no mass. There is no tenderness. There is no guarding.   Musculoskeletal: Normal range of motion. He exhibits no edema or tenderness.   Neurological: He is alert and oriented to person, place, and time.   Skin: Skin is warm and dry. Capillary refill takes less than 2 seconds. No erythema. No pallor.   Psychiatric: He has a normal mood and affect. His behavior is normal. Judgment and thought content normal.       Significant Labs:   CBC:   Recent Labs   Lab 01/29/20  1219 01/29/20  1412 01/30/20  0447   WBC 5.62  --  2.24*   HGB 16.3  --  15.1   HCT 51.0 51 45.9     --  183     CMP:   Recent Labs   Lab 01/29/20  1407 01/30/20  0447    134*   K 4.0 4.7  4.7   CL 95 93*   CO2 26 32*   GLU 95 195*   BUN 8 10   CREATININE 0.8 0.9   CALCIUM 8.4* 8.5*   PROT 7.1 7.4   ALBUMIN 3.4* 3.4*   BILITOT 0.5 0.6   ALKPHOS 127 121   * 90*   * 87*   ANIONGAP 16 9   EGFRNONAA >60.0 >60.0     Magnesium:   Recent Labs   Lab 01/30/20  0447   MG 2.2  2.2     Significant Imaging: I have reviewed and interpreted all pertinent imaging results/findings within the past 24 hours.      Assessment/Plan:      * COPD exacerbation  See Acute on Chronic Respiratory with hypoxia and hypercapnia    Transaminitis  Patient reports drinking 3 beers and 2 shots of liquor nightly and has been doing so for over a year.    PLAN:  -- patient placed on CIWA protocol due to being a daily drinker and having a history of seizures - has not required benzos and LFTs are trending down. Patient does not appear to be withdrawing  -- started patient on Thiamine 100 mg QD  --  RPR, HIV, and Hep panel - negative        Discharge planning issues  Patient will need refills on all of his meds as he reports being out of his medications. He has required daughter of [employer] in the past and currently has a financial constraint  that he is unable to pay for current inhalers. He is in the process of medicaid approval. He will need follow up with a PCP with daughters of [facility]elita of [facility] in the next 2 weeks as well and an appointment with a [facility] or [facility] GI doctor for a screening colonoscopy            Acute on chronic respiratory failure with hypoxia and hypercapnia  58 y/o AAM with known medical issues of COPD on Home Oxygen 2L QHS, Asthma, Seizure disorder (last seizure was 3-4 years ago) presenting to the ED with 3 days of shortness of breath with a non-productive cough. No recent Sick contacts. He is afebrile with no leukocytosis  Was started on BIPAP in the ED and weaned to NC. VB  CXR: No acute cardio-pulmonary process  Given 1X dose of solumedrol and IV azithromycin in the ED    PLAN:  -- Wean O2 to tolerance patient should be satting between 88-92%. PRN O2 ordered placed  -- Elio-nebs Q4H PRN  -- Incentive spirometry  -- Short course of prednisone 40 mg QD for 5 days  -- Continue Advair 1 puff QD + Spiriva for LABA/LAMA/ICS  -- Protonix while the patient is on steroids   -- F/U Flu and RVP are negative  -- Patient will need a follow up with a PCP in 2 weeks to ensure he has proper medications at home and to help with follow up with pulmonary OP      Tobacco abuse  Patient is an everyday smoker with a ~30 pack history    PLAN:  -- encourage smoking cessation  -- started on nicotine patch while inpatient  -- patient would do well to get PPV 23 vaccine before discharge  -- reasonable to do a screening low dose CT of his chest OP due to his risk factor and tobacco use history meeting criteria      Essential hypertension  PLAN:  -- Continue home Losartan-HCTZ. Currently working on getting a cost break down in order for patient to have this bedside delivered before discharge  -- Monitor vital daily        VTE Risk Mitigation (From admission, onward)         Ordered     enoxaparin injection 40 mg  Daily      20 1522     IP VTE  HIGH RISK PATIENT  Once      01/29/20 1522                  Sana Blount DO  Department of Hospital Medicine   [facility]

## 2020-01-30 NOTE — NURSING
Telemetry tech concerned that patient has new elevated T-wave.  Patient resting, asymptomatic.  VS stable.  IM4 overnight provider notified.

## 2020-01-30 NOTE — PT/OT/SLP EVAL
Occupational Therapy   Evaluation and Discharge Note    Name: Baltazar Mccray  MRN: [id number]  Admitting Diagnosis:  COPD exacerbation      Recommendations:     Discharge Recommendations: home  Discharge Equipment Recommendations:  none  Barriers to discharge:  None    Assessment:     Baltazar Mccray is a 59 y.o. male with a medical diagnosis of COPD exacerbation. At this time, patient is functioning at their prior level of function and does not require further acute OT services.     Plan:     During this hospitalization, patient does not require further acute OT services.  Please re-consult if situation changes.    · Plan of Care Reviewed with: patient    Subjective     Chief Complaint: no complaints   Patient/Family Comments/goals: return home    Occupational Profile:  Living Environment: Pt lives on the 3rd floor of an apt. 3 Flights to enter w/ B/L HR.   Previous level of function: Indep  Roles and Routines: N/A  Equipment Used at home:  oxygen  Assistance upon Discharge: No    Pain/Comfort:  · Pain Rating 1: 0/10  · Pain Rating Post-Intervention 1: 0/10    Patients cultural, spiritual, Yazidi conflicts given the current situation:      Objective:     Communicated with: RN prior to session.  Patient found HOB elevated with oxygen upon OT entry to room.    General Precautions: Standard, fall   Orthopedic Precautions:N/A   Braces: N/A     Occupational Performance:    Bed Mobility:    · Patient completed Scooting/Bridging with independence  · Patient completed Supine to Sit with independence  · Patient completed Sit to Supine with independence    Functional Mobility/Transfers:  · Patient completed Sit <> Stand Transfer with independence  with  no assistive device   · Functional Mobility: Pt ambualted ~120 ft indep. PT ascended/desecnded 10 stairs at sba w/ use of HR.     Activities of Daily Living:  · Lower Body Dressing: independence donned shoes seated EOB    Cognitive/Visual Perceptual:  Cognitive/Psychosocial  Skills:     -       Oriented to: Person, Place, Time and Situation   -       Follows Commands/attention:Follows multistep  commands  -       Communication: clear/fluent  -       Memory: No Deficits noted  -       Safety awareness/insight to disability: intact   -       Mood/Affect/Coping skills/emotional control: Appropriate to situation  Visual/Perceptual:      -Intact      Physical Exam:  Balance:    -       No noted deficits   Postural examination/scapula alignment:    -       Rounded shoulders  Skin integrity: Visible skin intact  Upper Extremity Range of Motion:     -       Right Upper Extremity: WFL  -       Left Upper Extremity: WFL  Upper Extremity Strength:    -       Right Upper Extremity: WFL  -       Left Upper Extremity: WFL   Strength:    -       Right Upper Extremity: WFL  -       Left Upper Extremity: WFL  Fine Motor Coordination:    -       Intact  Gross motor coordination:   WFL    AMPAC 6 Click ADL:  AMPAC Total Score: 24    Treatment & Education:  Pt educated on POC.   Education:    Patient left HOB elevated with all lines intact and call button in reach    GOALS:   Multidisciplinary Problems     Occupational Therapy Goals     Not on file          Multidisciplinary Problems (Resolved)        Problem: Occupational Therapy Goal    Goal Priority Disciplines Outcome Interventions   Occupational Therapy Goal   (Resolved)     OT, PT/OT Met    Description:  Pt is not currently displaying a need for acute OT services. D/C acute OT services and recommend pt D/C home.                    History:     Past Medical History:   Diagnosis Date    Asthma     COPD (chronic obstructive pulmonary disease)     home O2 at night only    Hypertension     Pneumonia     Seizures        History reviewed. No pertinent surgical history.    Time Tracking:     OT Date of Treatment: 01/30/20  OT Start Time: 0812  OT Stop Time: 0820  OT Total Time (min): 8 min    Billable Minutes:Evaluation 8 minutes    Corey MEDELLIN  Ernestina, OT  1/30/2020

## 2020-01-30 NOTE — ASSESSMENT & PLAN NOTE
Patient will need refills on all of his meds as he reports being out of his medications. He has required daughter of adelita in the past and currently has a financial constraint that he is unable to pay for current inhalers. He is in the process of medicaid approval. He will need follow up with a PCP with daughters chayito ureña of [facility] in the next 2 weeks as well and an appointment with a [facility] or [facility] GI doctor for a screening colonoscopy

## 2020-01-30 NOTE — SUBJECTIVE & OBJECTIVE
Interval History: NAEON. Patient admitted for an acute on chronic respiratory failure with hypoxia and hypercapnia secondary to a COPD exacerbation. The patient improved with BIPAP and was later transitioned to NC satting between 88-92% and started on a short course of prednisone 40 mg for 5 days. The patient is in the transition of qualifying for medicaid and currently does not have home inhalers of PAULA and LABA/ICS as well as his Hyzaar for HTN. CM and pharmacy working on options to obtain medications that the patient can afford. Patient will need a follow up appointment with [facility] or [facility] for GI to get a screening colonoscopy and PCP follow up     Review of Systems   Constitutional: Positive for appetite change, fatigue and unexpected weight change (10 lbs over the last 3-4 months). Negative for chills, diaphoresis and fever.   HENT: Negative for sore throat and trouble swallowing.    Eyes: Negative for photophobia and visual disturbance.   Respiratory: Positive for cough (non-productive) and shortness of breath. Negative for choking, chest tightness and wheezing.    Cardiovascular: Negative for chest pain, palpitations and leg swelling.   Gastrointestinal: Positive for abdominal pain. Negative for abdominal distention, diarrhea, nausea and vomiting.   Genitourinary: Negative for difficulty urinating and dysuria.   Musculoskeletal: Negative for arthralgias and myalgias.   Skin: Negative for color change and pallor.   Neurological: Negative for weakness, light-headedness and headaches.   Psychiatric/Behavioral: Negative for agitation. The patient is not nervous/anxious.      Objective:     Vital Signs (Most Recent):  Temp: 98.2 °F (36.8 °C) (01/30/20 1227)  Pulse: 78 (01/30/20 1509)  Resp: 18 (01/30/20 1432)  BP: (!) 162/97 (01/30/20 1227)  SpO2: (!) 94 % (01/30/20 1432) Vital Signs (24h Range):  Temp:  [97.6 °F (36.4 °C)-98.4 °F (36.9 °C)] 98.2 °F (36.8 °C)  Pulse:  [54-94] 78  Resp:  [14-24]  18  SpO2:  [78 %-100 %] 94 %  BP: (112-190)/(63-97) 162/97     Weight: 55.3 kg (122 lb)  Body mass index is 18.55 kg/m².    Intake/Output Summary (Last 24 hours) at 1/30/2020 1526  Last data filed at 1/30/2020 0500  Gross per 24 hour   Intake 850 ml   Output 300 ml   Net 550 ml      Physical Exam   Constitutional: He is oriented to person, place, and time. He appears cachectic. No distress.   HENT:   Head: Normocephalic and atraumatic.   Eyes: EOM are normal. No scleral icterus.   Neck: Neck supple. No JVD present.   Cardiovascular: Normal rate and regular rhythm.   Pulmonary/Chest: No tachypnea. He has decreased breath sounds in the right upper field, the right middle field, the right lower field, the left upper field, the left middle field and the left lower field. He has no wheezes. He has no rales.   Abdominal: Soft. Bowel sounds are normal. He exhibits no distension and no mass. There is no tenderness. There is no guarding.   Musculoskeletal: Normal range of motion. He exhibits no edema or tenderness.   Neurological: He is alert and oriented to person, place, and time.   Skin: Skin is warm and dry. Capillary refill takes less than 2 seconds. No erythema. No pallor.   Psychiatric: He has a normal mood and affect. His behavior is normal. Judgment and thought content normal.       Significant Labs:   CBC:   Recent Labs   Lab 01/29/20  1219 01/29/20  1412 01/30/20  0447   WBC 5.62  --  2.24*   HGB 16.3  --  15.1   HCT 51.0 51 45.9     --  183     CMP:   Recent Labs   Lab 01/29/20  1407 01/30/20  0447    134*   K 4.0 4.7  4.7   CL 95 93*   CO2 26 32*   GLU 95 195*   BUN 8 10   CREATININE 0.8 0.9   CALCIUM 8.4* 8.5*   PROT 7.1 7.4   ALBUMIN 3.4* 3.4*   BILITOT 0.5 0.6   ALKPHOS 127 121   * 90*   * 87*   ANIONGAP 16 9   EGFRNONAA >60.0 >60.0     Magnesium:   Recent Labs   Lab 01/30/20  0447   MG 2.2  2.2     Significant Imaging: I have reviewed and interpreted all pertinent imaging  results/findings within the past 24 hours.

## 2020-01-30 NOTE — PLAN OF CARE
CM completed discharge assessment with patient at the bedside. Provided RUTH which is updated by nursing staff. Patient lives alone in 3rd floor apartment with 3 flights of stairs. PTA patient was independent with ambulation and ADL's. Only DME used at home is oxygen @ 2L pm. Patient verbalized understanding.    SELF PAY: Medicaid Applied, pending.    PCP: Rhonda stratton Ignacia Melendez       01/30/20 1808   Discharge Assessment   Assessment Type Discharge Planning Assessment   Confirmed/corrected address and phone number on facesheet? Yes   Assessment information obtained from? Patient;Medical Record   Expected Length of Stay (days) 2   Communicated expected length of stay with patient/caregiver yes   Prior to hospitilization cognitive status: Alert/Oriented   Prior to hospitalization functional status: Independent   Current cognitive status: Alert/Oriented   Current Functional Status: Independent   Lives With alone   Able to Return to Prior Arrangements yes   Is patient able to care for self after discharge? Yes   Who are your caregiver(s) and their phone number(s)? Gladis Hammond and Viry Mccray   Patient's perception of discharge disposition home or selfcare   Patient currently being followed by outpatient case management? No   Patient currently receives any other outside agency services? No   Equipment Currently Used at Home oxygen   Do you have any problems affording any of your prescribed medications? Yes   Is the patient taking medications as prescribed? no   Does the patient have transportation home? Yes   Transportation Anticipated family or friend will provide   Does the patient receive services at the [facility]? No   Discharge Plan A Home   Discharge Plan B Home   DME Needed Upon Discharge  none  (already has oxygen at home)   Patient/Family in Agreement with Plan yes

## 2020-01-30 NOTE — PLAN OF CARE
Problem: Occupational Therapy Goal  Goal: Occupational Therapy Goal  Description  Pt is not currently displaying a need for acute OT services. D/C acute OT services and recommend pt D/C home.   Outcome: Met Corey Do OTR/L  1/30/2020

## 2020-01-30 NOTE — ASSESSMENT & PLAN NOTE
Patient reports drinking 3 beers and 2 shots of liquor nightly and has been doing so for over a year.    PLAN:  -- patient placed on CIWA protocol due to being a daily drinker and having a history of seizures - has not required benzos and LFTs are trending down. Patient does not appear to be withdrawing  -- started patient on Thiamine 100 mg QD  --  RPR, HIV, and Hep panel - negative

## 2020-01-30 NOTE — ED NOTES
Pt transported to floor with escort on tele box. No distress noted. Respirations even and unlabored. Remained AAox3. Will continue to monitor.

## 2020-01-30 NOTE — HOSPITAL COURSE
Patient admitted for an acute on chronic respiratory failure with hypoxia and hypercapnia secondary to a COPD exacerbation. The patient improved with BIPAP and was later transitioned to NC satting between 88-92% and started on a short course of prednisone 40 mg for 5 days. The patient is in the transition of qualifying for medicaid and currently does not have home inhalers of PAULA and LABA/ICS as well as his Hyzaar for HTN. CM and pharmacy collaboration was able to bedside deliver the remaining dosage of his prednisone, refill of his Hyzaar and an albuterol inhaler. Printed scripts were given to the patient to bring to his follow up appointment on 2/6/20 with daughters of adelita in [place] for a PCP establishment in order for him to get his medication filled at a discounted price through their pharmacy. Patient would do well at his appointment with his PCP for referral to GI to get a screening colonoscopy. He is currently medically stable and ready for discharge home.

## 2020-01-30 NOTE — ASSESSMENT & PLAN NOTE
PLAN:  -- Continue home Losartan-HCTZ. Currently working on getting a cost break down in order for patient to have this bedside delivered before discharge  -- Monitor vital daily

## 2020-01-30 NOTE — ASSESSMENT & PLAN NOTE
Patient is an everyday smoker with a ~30 pack history    PLAN:  -- encourage smoking cessation  -- started on nicotine patch while inpatient  -- patient would do well to get PPV 23 vaccine before discharge  -- reasonable to do a screening low dose CT of his chest OP due to his risk factor and tobacco use history meeting criteria

## 2020-01-30 NOTE — PT/OT/SLP EVAL
Physical Therapy Evaluation/Discharge    Patient Name:  Baltazar Mccray   MRN:  [id number]    Recommendations:     Discharge Recommendations:  home   Discharge Equipment Recommendations: none   Barriers to discharge: None    Assessment:     Baltazar Mccray is a 59 y.o. male admitted with a medical diagnosis of COPD exacerbation.  He presents with the following impairments/functional limitations:  impaired cardiopulmonary response to activity. PT evaluation complete and no goals established. Pt is functioning at high level and does not required acute care physical therapy services. Education provided to ambulate in hallway 3x/day with RN in order to maintain strength and endurance. Pt verbalized understanding. Please re-consult if functional mobility changes. Anticipate d/c to home; no needs.       Rehab Prognosis: Good     Recent Surgery: * No surgery found *      Plan:     · D/c from acute PT    Subjective     Chief Complaint: none reported   Patient/Family Comments/goals: to get better and return home  Pain/Comfort:  · Pain Rating 1: 9/10(stomach)  · Pain Addressed 1: Reposition, Distraction  · Pain Rating Post-Intervention 1: 9/10    Patients cultural, spiritual, Mandaeism conflicts given the current situation: no    Living Environment:  Pt lives alone in a 3rd floor apt with 3 flights of stairs to enter and B HR  Prior to admission, patients level of function was indep with ambulation and ADLs.  Equipment used at home: oxygen (2L prn).  DME owned (not currently used): none.  Upon discharge, patient will not have assistance.    Objective:     Communicated with RN prior to session.  Patient found HOB elevated with oxygen  upon PT entry to room.    General Precautions: Standard, fall   Orthopedic Precautions:N/A   Braces: N/A     Exams:  · Cognitive Exam:    · AAOx4  · Follows mulitstep commands   · Communication clear/fluent   · RLE ROM: WFL  · RLE Strength: WFL  · LLE ROM: WFL  · LLE Strength: WFL    Functional  Mobility:  · Bed Mobility:     · Scooting: independence  · Supine to Sit: independence  · Sit to Supine: independence  · Transfers:     · Sit to Stand:  independence with no AD from EOB  · Gait: 200ft with indep  · No LOB with horizontal head turns, change of speed, or change in direction  · Slight SOB  · No gait deviations noted   · Stairs: ascend/descent 1 flight of stairs using HRs with indep      Therapeutic Activities and Exercises:  Educated pt on PT role/POC  Educated pt on importance of OOB activity and daily ambulation   Pt verbalized understanding     AM-PAC 6 CLICK MOBILITY  Total Score:24     Patient left HOB elevated with all lines intact, call button in reach and RN notified.    GOALS:   Multidisciplinary Problems     Physical Therapy Goals     Not on file          Multidisciplinary Problems (Resolved)        Problem: Physical Therapy Goal    Goal Priority Disciplines Outcome Goal Variances Interventions   Physical Therapy Goal   (Resolved)     PT, PT/OT Met                     History:     Past Medical History:   Diagnosis Date    Asthma     COPD (chronic obstructive pulmonary disease)     home O2 at night only    Hypertension     Pneumonia     Seizures        History reviewed. No pertinent surgical history.    Time Tracking:     PT Received On: 01/30/20  PT Start Time: 0815     PT Stop Time: 0825  PT Total Time (min): 10 min     Billable Minutes: Evaluation 10    Dionna Terry PT, DPT  1/30/2020  [phone]

## 2020-01-31 VITALS
HEART RATE: 100 BPM | RESPIRATION RATE: 18 BRPM | WEIGHT: 122 LBS | TEMPERATURE: 98 F | SYSTOLIC BLOOD PRESSURE: 130 MMHG | BODY MASS INDEX: 18.49 KG/M2 | HEIGHT: 68 IN | OXYGEN SATURATION: 100 % | DIASTOLIC BLOOD PRESSURE: 83 MMHG

## 2020-01-31 LAB
ALBUMIN SERPL BCP-MCNC: 3.1 G/DL (ref 3.5–5.2)
ALP SERPL-CCNC: 105 U/L (ref 55–135)
ALT SERPL W/O P-5'-P-CCNC: 68 U/L (ref 10–44)
ANION GAP SERPL CALC-SCNC: 11 MMOL/L (ref 8–16)
AST SERPL-CCNC: 57 U/L (ref 10–40)
BASOPHILS # BLD AUTO: 0.01 K/UL (ref 0–0.2)
BASOPHILS NFR BLD: 0.1 % (ref 0–1.9)
BILIRUB SERPL-MCNC: 0.7 MG/DL (ref 0.1–1)
BUN SERPL-MCNC: 10 MG/DL (ref 6–20)
CALCIUM SERPL-MCNC: 8.7 MG/DL (ref 8.7–10.5)
CHLORIDE SERPL-SCNC: 96 MMOL/L (ref 95–110)
CO2 SERPL-SCNC: 29 MMOL/L (ref 23–29)
CREAT SERPL-MCNC: 0.8 MG/DL (ref 0.5–1.4)
DIFFERENTIAL METHOD: ABNORMAL
EOSINOPHIL # BLD AUTO: 0 K/UL (ref 0–0.5)
EOSINOPHIL NFR BLD: 0 % (ref 0–8)
ERYTHROCYTE [DISTWIDTH] IN BLOOD BY AUTOMATED COUNT: 20.8 % (ref 11.5–14.5)
EST. GFR  (AFRICAN AMERICAN): >60 ML/MIN/1.73 M^2
EST. GFR  (NON AFRICAN AMERICAN): >60 ML/MIN/1.73 M^2
GLUCOSE SERPL-MCNC: 98 MG/DL (ref 70–110)
HCT VFR BLD AUTO: 47.4 % (ref 40–54)
HGB BLD-MCNC: 15.1 G/DL (ref 14–18)
IMM GRANULOCYTES # BLD AUTO: 0.03 K/UL (ref 0–0.04)
IMM GRANULOCYTES NFR BLD AUTO: 0.4 % (ref 0–0.5)
LYMPHOCYTES # BLD AUTO: 0.9 K/UL (ref 1–4.8)
LYMPHOCYTES NFR BLD: 11.5 % (ref 18–48)
MAGNESIUM SERPL-MCNC: 2.2 MG/DL (ref 1.6–2.6)
MCH RBC QN AUTO: 25.1 PG (ref 27–31)
MCHC RBC AUTO-ENTMCNC: 31.9 G/DL (ref 32–36)
MCV RBC AUTO: 79 FL (ref 82–98)
MONOCYTES # BLD AUTO: 1 K/UL (ref 0.3–1)
MONOCYTES NFR BLD: 12 % (ref 4–15)
NEUTROPHILS # BLD AUTO: 6.1 K/UL (ref 1.8–7.7)
NEUTROPHILS NFR BLD: 76 % (ref 38–73)
NRBC BLD-RTO: 0 /100 WBC
PHOSPHATE SERPL-MCNC: 3.1 MG/DL (ref 2.7–4.5)
PLATELET # BLD AUTO: 202 K/UL (ref 150–350)
PMV BLD AUTO: 10.5 FL (ref 9.2–12.9)
POTASSIUM SERPL-SCNC: 3.7 MMOL/L (ref 3.5–5.1)
PROT SERPL-MCNC: 6.7 G/DL (ref 6–8.4)
RBC # BLD AUTO: 6.02 M/UL (ref 4.6–6.2)
SODIUM SERPL-SCNC: 136 MMOL/L (ref 136–145)
WBC # BLD AUTO: 8.08 K/UL (ref 3.9–12.7)

## 2020-01-31 PROCEDURE — 99238 PR HOSPITAL DISCHARGE DAY,<30 MIN: ICD-10-PCS | Mod: ,,, | Performed by: HOSPITALIST

## 2020-01-31 PROCEDURE — 80053 COMPREHEN METABOLIC PANEL: CPT

## 2020-01-31 PROCEDURE — 25000242 PHARM REV CODE 250 ALT 637 W/ HCPCS: Performed by: STUDENT IN AN ORGANIZED HEALTH CARE EDUCATION/TRAINING PROGRAM

## 2020-01-31 PROCEDURE — S4991 NICOTINE PATCH NONLEGEND: HCPCS | Performed by: STUDENT IN AN ORGANIZED HEALTH CARE EDUCATION/TRAINING PROGRAM

## 2020-01-31 PROCEDURE — 25000003 PHARM REV CODE 250: Performed by: STUDENT IN AN ORGANIZED HEALTH CARE EDUCATION/TRAINING PROGRAM

## 2020-01-31 PROCEDURE — 63600175 PHARM REV CODE 636 W HCPCS: Performed by: STUDENT IN AN ORGANIZED HEALTH CARE EDUCATION/TRAINING PROGRAM

## 2020-01-31 PROCEDURE — 99238 HOSP IP/OBS DSCHRG MGMT 30/<: CPT | Mod: ,,, | Performed by: HOSPITALIST

## 2020-01-31 PROCEDURE — 94761 N-INVAS EAR/PLS OXIMETRY MLT: CPT

## 2020-01-31 PROCEDURE — 83735 ASSAY OF MAGNESIUM: CPT

## 2020-01-31 PROCEDURE — 36415 COLL VENOUS BLD VENIPUNCTURE: CPT

## 2020-01-31 PROCEDURE — 27000221 HC OXYGEN, UP TO 24 HOURS

## 2020-01-31 PROCEDURE — 84100 ASSAY OF PHOSPHORUS: CPT

## 2020-01-31 PROCEDURE — 94640 AIRWAY INHALATION TREATMENT: CPT

## 2020-01-31 PROCEDURE — 99900035 HC TECH TIME PER 15 MIN (STAT)

## 2020-01-31 PROCEDURE — 85025 COMPLETE CBC W/AUTO DIFF WBC: CPT

## 2020-01-31 RX ORDER — FLUTICASONE PROPIONATE AND SALMETEROL 250; 50 UG/1; UG/1
1 POWDER RESPIRATORY (INHALATION) 2 TIMES DAILY
Qty: 60 EACH | Refills: 11 | Status: CANCELLED | OUTPATIENT
Start: 2020-01-31 | End: 2021-01-30

## 2020-01-31 RX ORDER — ALBUTEROL SULFATE 90 UG/1
2 AEROSOL, METERED RESPIRATORY (INHALATION) EVERY 6 HOURS PRN
Qty: 18 G | Refills: 11 | Status: ON HOLD | OUTPATIENT
Start: 2020-01-31 | End: 2020-04-23 | Stop reason: SDUPTHER

## 2020-01-31 RX ORDER — ALBUTEROL SULFATE 90 UG/1
1 AEROSOL, METERED RESPIRATORY (INHALATION) 2 TIMES DAILY
Status: DISCONTINUED | OUTPATIENT
Start: 2020-01-31 | End: 2020-01-31 | Stop reason: HOSPADM

## 2020-01-31 RX ORDER — TIOTROPIUM BROMIDE 18 UG/1
18 CAPSULE ORAL; RESPIRATORY (INHALATION) DAILY
Qty: 30 CAPSULE | Refills: 11 | Status: SHIPPED | OUTPATIENT
Start: 2020-01-31 | End: 2020-04-15 | Stop reason: SDUPTHER

## 2020-01-31 RX ORDER — LOSARTAN POTASSIUM AND HYDROCHLOROTHIAZIDE 12.5; 1 MG/1; MG/1
1 TABLET ORAL DAILY
Qty: 90 TABLET | Refills: 3 | Status: ON HOLD | OUTPATIENT
Start: 2020-01-31 | End: 2020-03-17 | Stop reason: HOSPADM

## 2020-01-31 RX ORDER — FLUTICASONE PROPIONATE AND SALMETEROL 250; 50 UG/1; UG/1
1 POWDER RESPIRATORY (INHALATION) 2 TIMES DAILY
Qty: 60 EACH | Refills: 11 | Status: SHIPPED | OUTPATIENT
Start: 2020-01-31 | End: 2020-01-31 | Stop reason: SDUPTHER

## 2020-01-31 RX ORDER — ALBUTEROL SULFATE 90 UG/1
2 AEROSOL, METERED RESPIRATORY (INHALATION) EVERY 6 HOURS PRN
Qty: 18 G | Refills: 11 | Status: CANCELLED | OUTPATIENT
Start: 2020-01-31 | End: 2021-01-30

## 2020-01-31 RX ORDER — FLUTICASONE PROPIONATE AND SALMETEROL 250; 50 UG/1; UG/1
1 POWDER RESPIRATORY (INHALATION) 2 TIMES DAILY
Qty: 60 EACH | Refills: 11 | Status: ON HOLD | OUTPATIENT
Start: 2020-01-31 | End: 2020-03-16

## 2020-01-31 RX ADMIN — FOLIC ACID 1 MG: 1 TABLET ORAL at 08:01

## 2020-01-31 RX ADMIN — FLUTICASONE FUROATE AND VILANTEROL TRIFENATATE 1 PUFF: 100; 25 POWDER RESPIRATORY (INHALATION) at 08:01

## 2020-01-31 RX ADMIN — Medication 100 MG: at 08:01

## 2020-01-31 RX ADMIN — Medication 1 PATCH: at 09:01

## 2020-01-31 RX ADMIN — SENNOSIDES AND DOCUSATE SODIUM 1 TABLET: 8.6; 5 TABLET ORAL at 08:01

## 2020-01-31 RX ADMIN — PREDNISONE 40 MG: 10 TABLET ORAL at 08:01

## 2020-01-31 RX ADMIN — TIOTROPIUM BROMIDE 18 MCG: 18 CAPSULE ORAL; RESPIRATORY (INHALATION) at 08:01

## 2020-01-31 RX ADMIN — THERA TABS 1 TABLET: TAB at 08:01

## 2020-01-31 RX ADMIN — IPRATROPIUM BROMIDE AND ALBUTEROL SULFATE 3 ML: .5; 3 SOLUTION RESPIRATORY (INHALATION) at 01:01

## 2020-01-31 RX ADMIN — IPRATROPIUM BROMIDE AND ALBUTEROL SULFATE 3 ML: .5; 3 SOLUTION RESPIRATORY (INHALATION) at 08:01

## 2020-01-31 RX ADMIN — LOSARTAN POTASSIUM AND HYDROCHLOROTHIAZIDE 1 TABLET: 100; 12.5 TABLET, FILM COATED ORAL at 08:01

## 2020-01-31 RX ADMIN — ALBUTEROL SULFATE 1 PUFF: 90 AEROSOL, METERED RESPIRATORY (INHALATION) at 10:01

## 2020-01-31 RX ADMIN — PANTOPRAZOLE SODIUM 40 MG: 40 TABLET, DELAYED RELEASE ORAL at 08:01

## 2020-01-31 NOTE — PLAN OF CARE
Ongoing communications with team regarding patients discharge medication needs. 1) Advair: $148.09 2) Prednisone: $9.39 3) Spiriva: $519.94 4) Albuterol: $54.81 5) Losartin-HCTZ: $10.45. Total - $742.68. Patient has applied for Medicaid, but pending approval.    CM department will cover the Predisone, Losartin-HCTZ, and Advair Inhaler ($167.93). Dr Blount and PharmD will find alternative option for the Spiria.     Due to the need of the maintenance inhaler, Advair, MARV obtained approval from Raven Benavides RN for the additional cost. Cost transfer form completed and faxed to [facility] pharmacy [phone].     CM updated team and patient.    CM will continue to follow-up and assist team.    Radha Crawford RN  [id number]

## 2020-01-31 NOTE — PHARMACY MED REC
"Admission Medication Reconciliation - Pharmacy Consult Note    The home medication history was taken by Robyn Alfonso.  Based on information gathered and subsequent review by the clinical pharmacist, the items below may need attention.    You may go to "Admission" then "Reconcile Home Medications" tabs to review and/or act upon these items.        No issues noted with the medication reconciliation.        Please address this information as you see fit.  Feel free to contact us if you have any questions or require assistance.    Joe Dawkins, PharmD  [id number]              .    .          "

## 2020-01-31 NOTE — ASSESSMENT & PLAN NOTE
PLAN:  -- Continue home Losartan-HCTZ. Patient was given a refill delivered at bedside before discharge.

## 2020-01-31 NOTE — ASSESSMENT & PLAN NOTE
60 y/o AAM with known medical issues of COPD on Home Oxygen 2L QHS, Asthma, Seizure disorder (last seizure was 3-4 years ago) presenting to the ED with 3 days of shortness of breath with a non-productive cough. No recent Sick contacts. He is afebrile with no leukocytosis  Was started on BIPAP in the ED and weaned to NC. VB36  CXR: No acute cardio-pulmonary process  Given 1X dose of solumedrol and IV azithromycin in the ED    PLAN:  -- Wean O2 to tolerance patient should be satting between 88-92%. PRN O2 ordered placed  -- Elio-nebs Q4H PRN while inpatient  -- Incentive spirometry   -- Short course of prednisone 40 mg QD for 5 days - patient had bedside delivery of remaining dosages  -- Continue Advair 1 puff QD + Spiriva for LABA/LAMA/ICS - hard copy scipts given to patient to fill at [facility]  -- Protonix while the patient is on steroids   -- F/U Flu and RVP are negative  -- Follow up with PCP at [facility] on 20   -- Patient bedside delivered a albuterol rescue inhaler before discharge

## 2020-01-31 NOTE — ASSESSMENT & PLAN NOTE
Patient is an everyday smoker with a ~30 pack history    PLAN:  -- encourage smoking cessation  -- started on nicotine patch while inpatient  -- CT chest inpatient showed micro nodules and atelectasis and it was recommended for him to get a CT chest annually  -- reasonable to be given a PNA vaccine at his next PCP appointment on 2/6/20 due to risk factors such as smoking and COPD

## 2020-01-31 NOTE — PLAN OF CARE
01/31/20 1217   Final Note   Assessment Type Final Discharge Note   Anticipated Discharge Disposition Home   Hospital Follow Up  Appt(s) scheduled? Yes   Right Care Referral Info   Post Acute Recommendation No Care

## 2020-01-31 NOTE — DISCHARGE SUMMARY
[facility] Hospital Medicine  Discharge Summary      Patient Name: Baltazar Mccray  MRN: [id number]  Admission Date: 1/29/2020  Hospital Length of Stay: 2 days  Discharge Date and Time:  01/31/2020 4:28 PM  Attending Physician: Rick Correia MD   Discharging Provider: Sana Blount DO  Primary Care Provider: [facility] Medicine Team: [facility]OSP MED 4 Sana Blount DO    HPI:   Mr Mccray is a 60 y/o AAM with known medical issues of COPD on Home Oxygen 2L QHS, Asthma, Seizure disorder (last seizure was 3-4 years ago) presenting to the ED with 3 days of shortness of breath with a non-productive cough. The patient reports that he feels that his home oxygen was not sufficient to cover his shortness of breath and he was becoming more dyspneic on exertion. Patient denies associated chest pain, palpitations, fever, chills, edema, light headedness. He denies being recently sick or around sick contacts. 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 The patient reports running out of his albuterol inhaler last week and LABA/ICS (Advair) inhaler today before coming to the ED. He was recently on a short course of oral steroids and azithromycin for a similar exacerbation he presented to the ED for on 1/3/20. The patient is not followed by a PCP and has not seen a pulmonologist with no recent PFTs on file. He reports having abdominal discomfort leading up to his admission with some black stools for 2 days that has now subsided. The patient reports having no BRBPR and no reflux symptoms, nausea, hematemesis, emesis, and diarrhea. He does report having a 10 lbs unintentional weight loss over the last 3-4 months due to not having as good of an appetite but denies other B-symptoms. The patient is an everyday smoker with a 30 pack year history and drinks 3 beers + 2 shot of liquor nightly. Last drink was 1/28/20     In  the ED he was found to be satting in the high 70s low 80s on Room air. VBG had a pH of 7.27/69/36. Patient was placed on BIPAP and eventually weaned to NC satting comfortably between 88-92%. He is afebrile, no leukocytosis, CXR showed no acute cardiopulmonary process. He was found to have elevated LFTs  and . He was given a 1X dose of solumedrol and azithromycin and transferred to hospital medicine team 4 for further work up and management.    * No surgery found *      Hospital Course:   Patient admitted for an acute on chronic respiratory failure with hypoxia and hypercapnia secondary to a COPD exacerbation. The patient improved with BIPAP and was later transitioned to NC satting between 88-92% and started on a short course of prednisone 40 mg for 5 days. The patient is in the transition of qualifying for medicaid and currently does not have home inhalers of PAULA and LABA/ICS as well as his Hyzaar for HTN. CM and pharmacy collaboration was able to bedside deliver the remaining dosage of his prednisone, refill of his Hyzaar and an albuterol inhaler. Printed scripts were given to the patient to bring to his follow up appointment on 2/6/20 with [facility] in [place] for a PCP establishment in order for him to get his medication filled at a discounted price through their pharmacy. Patient would do well at his appointment with his PCP for referral to GI to get a screening colonoscopy. He is currently medically stable and ready for discharge home.     Physical Exam   Constitutional: He is oriented to person, place, and time. He appears cachectic. No distress.   HENT:   Head: Normocephalic and atraumatic.   Eyes: EOM are normal. No scleral icterus.   Neck: Neck supple. No JVD present.   Cardiovascular: Normal rate and regular rhythm.   Pulmonary/Chest: No tachypnea. He has decreased breath sounds in the right upper field, the right middle field, the right lower field, the left upper field, the  left middle field and the left lower field. He has no wheezes. He has no rales.   Abdominal: Soft. Bowel sounds are normal. He exhibits no distension and no mass. There is no tenderness. There is no guarding.   Musculoskeletal: Normal range of motion. He exhibits no edema or tenderness.   Neurological: He is alert and oriented to person, place, and time.   Skin: Skin is warm and dry. Capillary refill takes less than 2 seconds. No erythema. No pallor.   Psychiatric: He has a normal mood and affect. His behavior is normal. Judgment and thought content normal.     * COPD exacerbation  See Acute on Chronic Respiratory with hypoxia and hypercapnia    Transaminitis  Patient reports drinking 3 beers and 2 shots of liquor nightly and has been doing so for over a year.    PLAN:  -- patient placed on CIWA protocol due to being a daily drinker and having a history of seizures - has not required benzos and LFTs are trending down. Patient does not appear to be withdrawing  -- started patient on Thiamine 100 mg QD while inpatient  --  RPR, HIV, and Hep panel - negative        Acute on chronic respiratory failure with hypoxia and hypercapnia  60 y/o AAM with known medical issues of COPD on Home Oxygen 2L QHS, Asthma, Seizure disorder (last seizure was 3-4 years ago) presenting to the ED with 3 days of shortness of breath with a non-productive cough. No recent Sick contacts. He is afebrile with no leukocytosis  Was started on BIPAP in the ED and weaned to NC. VB.27/69/36  CXR: No acute cardio-pulmonary process  Given 1X dose of solumedrol and IV azithromycin in the ED    PLAN:  -- Wean O2 to tolerance patient should be satting between 88-92%. PRN O2 ordered placed  -- Elio-nebs Q4H PRN while inpatient  -- Incentive spirometry   -- Short course of prednisone 40 mg QD for 5 days - patient had bedside delivery of remaining dosages  -- Continue Advair 1 puff QD + Spiriva for LABA/LAMA/ICS - hard copy scipts given to patient to fill  at [facility]  -- Protonix while the patient is on steroids   -- F/U Flu and RVP are negative  -- Follow up with PCP at [facility] on 2/6/20   -- Patient bedside delivered a albuterol rescue inhaler before discharge      Tobacco abuse  Patient is an everyday smoker with a ~30 pack history    PLAN:  -- encourage smoking cessation  -- started on nicotine patch while inpatient  -- CT chest inpatient showed micro nodules and atelectasis and it was recommended for him to get a CT chest annually  -- reasonable to be given a PNA vaccine at his next PCP appointment on 2/6/20 due to risk factors such as smoking and COPD      Essential hypertension  PLAN:  -- Continue home Losartan-HCTZ. Patient was given a refill delivered at bedside before discharge.        Final Active Diagnoses:    Diagnosis Date Noted POA    PRINCIPAL PROBLEM:  COPD exacerbation [J44.1] 05/14/2017 Yes    Acute on chronic respiratory failure with hypoxia and hypercapnia [J96.21, J96.22] 01/29/2020 Yes    Transaminitis [R74.0] 01/29/2020 Yes    Tobacco abuse [Z72.0] 08/12/2018 Yes    Essential hypertension [I10] 05/19/2017 Yes      Problems Resolved During this Admission:       Discharged Condition: stable    Disposition: Home or Self Care    Follow Up:  Follow-up Information     [facility] on 2/6/2020.    Why:  at 12 noon for hospital follow-up  Contact information:  [address]  [facility] [address]  [phone]                 Patient Instructions:      Diet Adult Regular     Notify your health care provider if you experience any of the following:  temperature >100.4     Notify your health care provider if you experience any of the following:  persistent nausea and vomiting or diarrhea     Notify your health care provider if you experience any of the following:  severe uncontrolled pain     Notify your health care provider if you experience any of the following:  redness, tenderness, or signs  of infection (pain, swelling, redness, odor or green/yellow discharge around incision site)     Notify your health care provider if you experience any of the following:  difficulty breathing or increased cough     Notify your health care provider if you experience any of the following:  persistent dizziness, light-headedness, or visual disturbances     Notify your health care provider if you experience any of the following:  increased confusion or weakness     Activity as tolerated       Significant Diagnostic Studies: Labs:   BMP:   Recent Labs   Lab 01/30/20 0447 01/31/20 0348   * 98   * 136   K 4.7  4.7 3.7   CL 93* 96   CO2 32* 29   BUN 10 10   CREATININE 0.9 0.8   CALCIUM 8.5* 8.7   MG 2.2  2.2 2.2   , CMP   Recent Labs   Lab 01/30/20 0447 01/31/20 0348   * 136   K 4.7  4.7 3.7   CL 93* 96   CO2 32* 29   * 98   BUN 10 10   CREATININE 0.9 0.8   CALCIUM 8.5* 8.7   PROT 7.4 6.7   ALBUMIN 3.4* 3.1*   BILITOT 0.6 0.7   ALKPHOS 121 105   AST 90* 57*   ALT 87* 68*   ANIONGAP 9 11   ESTGFRAFRICA >60.0 >60.0   EGFRNONAA >60.0 >60.0   , Troponin   Recent Labs   Lab 01/29/20  1844   TROPONINI 0.029*    and A1C: No results for input(s): HGBA1C in the last 4320 hours.    Pending Diagnostic Studies:     None         Medications:  Reconciled Home Medications:      Medication List      START taking these medications    albuterol 90 mcg/actuation inhaler  Commonly known as:  Proventil HFA  Inhale 2 puffs into the lungs every 6 (six) hours as needed for Wheezing. Rescue     fluticasone-salmeterol 250-50 mcg/dose 250-50 mcg/dose diskus inhaler  Commonly known as:  ADVAIR  Inhale 1 puff into the lungs 2 (two) times daily. Controller     ipratropium-albuterol  mcg/actuation inhaler  Commonly known as:  COMBIVENT  Inhale 1 puff into the lungs every 6 (six) hours as needed for Wheezing. Rescue     predniSONE 20 MG tablet  Commonly known as:  DELTASONE  Take 2 tablets (40 mg total) by mouth once  daily. for 4 days     tiotropium 18 mcg inhalation capsule  Commonly known as:  SPIRIVA  Inhale 1 capsule (18 mcg total) into the lungs once daily. Controller        CHANGE how you take these medications    losartan-hydrochlorothiazide 100-12.5 mg 100-12.5 mg Tab  Commonly known as:  HYZAAR  Take 1 tablet by mouth once daily.  What changed:  additional instructions        CONTINUE taking these medications    omeprazole 20 MG capsule  Commonly known as:  PRILOSEC  Take 1 capsule (20 mg total) by mouth once daily.        STOP taking these medications    azithromycin 250 MG tablet  Commonly known as:  Z-DIEGO     nicotine 14 mg/24 hr  Commonly known as:  NICODERM CQ            Indwelling Lines/Drains at time of discharge:   Lines/Drains/Airways     None                 Time spent on the discharge of patient: 35 minutes  Patient was seen and examined on the date of discharge and determined to be suitable for discharge.         Sana Blount DO  Department of Hospital Medicine  [facility]

## 2020-01-31 NOTE — ASSESSMENT & PLAN NOTE
Patient reports drinking 3 beers and 2 shots of liquor nightly and has been doing so for over a year.    PLAN:  -- patient placed on CIWA protocol due to being a daily drinker and having a history of seizures - has not required benzos and LFTs are trending down. Patient does not appear to be withdrawing  -- started patient on Thiamine 100 mg QD while inpatient  --  RPR, HIV, and Hep panel - negative

## 2020-03-15 ENCOUNTER — HOSPITAL ENCOUNTER (INPATIENT)
Facility: HOSPITAL | Age: 60
LOS: 2 days | Discharge: HOME | DRG: 189 | End: 2020-03-17
Attending: EMERGENCY MEDICINE | Admitting: STUDENT IN AN ORGANIZED HEALTH CARE EDUCATION/TRAINING PROGRAM
Payer: MEDICAID

## 2020-03-15 DIAGNOSIS — J96.22 ACUTE ON CHRONIC RESPIRATORY FAILURE WITH HYPOXIA AND HYPERCAPNIA: Primary | ICD-10-CM

## 2020-03-15 DIAGNOSIS — R06.02 SHORTNESS OF BREATH: ICD-10-CM

## 2020-03-15 DIAGNOSIS — J96.21 ACUTE ON CHRONIC RESPIRATORY FAILURE WITH HYPOXIA AND HYPERCAPNIA: Primary | ICD-10-CM

## 2020-03-15 DIAGNOSIS — I50.20 HFREF (HEART FAILURE WITH REDUCED EJECTION FRACTION): ICD-10-CM

## 2020-03-15 DIAGNOSIS — R06.02 SOB (SHORTNESS OF BREATH): ICD-10-CM

## 2020-03-15 LAB
ADENOVIRUS: NOT DETECTED
ALBUMIN SERPL BCP-MCNC: 3.4 G/DL (ref 3.5–5.2)
ALLENS TEST: ABNORMAL
ALP SERPL-CCNC: 121 U/L (ref 55–135)
ALT SERPL W/O P-5'-P-CCNC: 58 U/L (ref 10–44)
ANION GAP SERPL CALC-SCNC: 13 MMOL/L (ref 8–16)
AST SERPL-CCNC: 87 U/L (ref 10–40)
BACTERIA #/AREA URNS AUTO: NORMAL /HPF
BASOPHILS # BLD AUTO: 0.09 K/UL (ref 0–0.2)
BASOPHILS NFR BLD: 1.4 % (ref 0–1.9)
BILIRUB SERPL-MCNC: 0.4 MG/DL (ref 0.1–1)
BILIRUB UR QL STRIP: NEGATIVE
BNP SERPL-MCNC: 574 PG/ML (ref 0–99)
BORDETELLA PARAPERTUSSIS (IS1001): NOT DETECTED
BORDETELLA PERTUSSIS (PTXP): NOT DETECTED
BUN SERPL-MCNC: 9 MG/DL (ref 6–20)
CALCIUM SERPL-MCNC: 8.4 MG/DL (ref 8.7–10.5)
CHLAMYDIA PNEUMONIAE: NOT DETECTED
CHLORIDE SERPL-SCNC: 99 MMOL/L (ref 95–110)
CLARITY UR REFRACT.AUTO: CLEAR
CO2 SERPL-SCNC: 26 MMOL/L (ref 23–29)
COLOR UR AUTO: YELLOW
CORONAVIRUS 229E, COMMON COLD VIRUS: NOT DETECTED
CORONAVIRUS HKU1, COMMON COLD VIRUS: NOT DETECTED
CORONAVIRUS NL63, COMMON COLD VIRUS: NOT DETECTED
CORONAVIRUS OC43, COMMON COLD VIRUS: NOT DETECTED
CREAT SERPL-MCNC: 0.8 MG/DL (ref 0.5–1.4)
D DIMER PPP IA.FEU-MCNC: 1.6 MG/L FEU
DIFFERENTIAL METHOD: ABNORMAL
EOSINOPHIL # BLD AUTO: 0.1 K/UL (ref 0–0.5)
EOSINOPHIL NFR BLD: 2.1 % (ref 0–8)
ERYTHROCYTE [DISTWIDTH] IN BLOOD BY AUTOMATED COUNT: 19.7 % (ref 11.5–14.5)
EST. GFR  (AFRICAN AMERICAN): >60 ML/MIN/1.73 M^2
EST. GFR  (NON AFRICAN AMERICAN): >60 ML/MIN/1.73 M^2
ESTIMATED AVG GLUCOSE: 108 MG/DL (ref 68–131)
FERRITIN SERPL-MCNC: 411 NG/ML (ref 20–300)
FLUBV RNA NPH QL NAA+NON-PROBE: NOT DETECTED
GLUCOSE SERPL-MCNC: 53 MG/DL (ref 70–110)
GLUCOSE UR QL STRIP: NEGATIVE
HBA1C MFR BLD HPLC: 5.4 % (ref 4–5.6)
HCO3 UR-SCNC: 32.9 MMOL/L (ref 24–28)
HCT VFR BLD AUTO: 43.2 % (ref 40–54)
HGB BLD-MCNC: 13.6 G/DL (ref 14–18)
HGB UR QL STRIP: ABNORMAL
HPIV1 RNA NPH QL NAA+NON-PROBE: NOT DETECTED
HPIV2 RNA NPH QL NAA+NON-PROBE: NOT DETECTED
HPIV3 RNA NPH QL NAA+NON-PROBE: NOT DETECTED
HPIV4 RNA NPH QL NAA+NON-PROBE: NOT DETECTED
HUMAN METAPNEUMOVIRUS: NOT DETECTED
HYALINE CASTS UR QL AUTO: 1 /LPF
IMM GRANULOCYTES # BLD AUTO: 0.02 K/UL (ref 0–0.04)
IMM GRANULOCYTES NFR BLD AUTO: 0.3 % (ref 0–0.5)
INFLUENZA A (SUBTYPES H1,H1-2009,H3): NOT DETECTED
INFLUENZA A, MOLECULAR: NEGATIVE
INFLUENZA B, MOLECULAR: NEGATIVE
IRON SERPL-MCNC: 40 UG/DL (ref 45–160)
KETONES UR QL STRIP: ABNORMAL
LEUKOCYTE ESTERASE UR QL STRIP: NEGATIVE
LYMPHOCYTES # BLD AUTO: 2.8 K/UL (ref 1–4.8)
LYMPHOCYTES NFR BLD: 42.2 % (ref 18–48)
MCH RBC QN AUTO: 26.9 PG (ref 27–31)
MCHC RBC AUTO-ENTMCNC: 31.5 G/DL (ref 32–36)
MCV RBC AUTO: 85 FL (ref 82–98)
MICROSCOPIC COMMENT: NORMAL
MONOCYTES # BLD AUTO: 0.8 K/UL (ref 0.3–1)
MONOCYTES NFR BLD: 12.4 % (ref 4–15)
MYCOPLASMA PNEUMONIAE: NOT DETECTED
NEUTROPHILS # BLD AUTO: 2.8 K/UL (ref 1.8–7.7)
NEUTROPHILS NFR BLD: 41.6 % (ref 38–73)
NITRITE UR QL STRIP: NEGATIVE
NRBC BLD-RTO: 0 /100 WBC
PCO2 BLDA: 66.2 MMHG (ref 35–45)
PH SMN: 7.3 [PH] (ref 7.35–7.45)
PH UR STRIP: 5 [PH] (ref 5–8)
PLATELET # BLD AUTO: 227 K/UL (ref 150–350)
PMV BLD AUTO: 10 FL (ref 9.2–12.9)
PO2 BLDA: 77 MMHG (ref 40–60)
POC BE: 7 MMOL/L
POC SATURATED O2: 93 % (ref 95–100)
POC TCO2: 35 MMOL/L (ref 24–29)
POCT GLUCOSE: 188 MG/DL (ref 70–110)
POTASSIUM SERPL-SCNC: 4.2 MMOL/L (ref 3.5–5.1)
PROT SERPL-MCNC: 7.5 G/DL (ref 6–8.4)
PROT UR QL STRIP: ABNORMAL
RBC # BLD AUTO: 5.06 M/UL (ref 4.6–6.2)
RBC #/AREA URNS AUTO: 2 /HPF (ref 0–4)
RESPIRATORY INFECTION PANEL SOURCE: NORMAL
RSV RNA NPH QL NAA+NON-PROBE: NOT DETECTED
RV+EV RNA NPH QL NAA+NON-PROBE: NOT DETECTED
SAMPLE: ABNORMAL
SATURATED IRON: 14 % (ref 20–50)
SITE: ABNORMAL
SODIUM SERPL-SCNC: 138 MMOL/L (ref 136–145)
SP GR UR STRIP: 1.01 (ref 1–1.03)
SPECIMEN SOURCE: NORMAL
TOTAL IRON BINDING CAPACITY: 293 UG/DL (ref 250–450)
TRANSFERRIN SERPL-MCNC: 198 MG/DL (ref 200–375)
TROPONIN I SERPL DL<=0.01 NG/ML-MCNC: 0.06 NG/ML (ref 0–0.03)
TROPONIN I SERPL DL<=0.01 NG/ML-MCNC: 0.07 NG/ML (ref 0–0.03)
URN SPEC COLLECT METH UR: ABNORMAL
WBC # BLD AUTO: 6.63 K/UL (ref 3.9–12.7)
WBC #/AREA URNS AUTO: 1 /HPF (ref 0–5)

## 2020-03-15 PROCEDURE — 83036 HEMOGLOBIN GLYCOSYLATED A1C: CPT

## 2020-03-15 PROCEDURE — 83540 ASSAY OF IRON: CPT

## 2020-03-15 PROCEDURE — 85379 FIBRIN DEGRADATION QUANT: CPT

## 2020-03-15 PROCEDURE — 82803 BLOOD GASES ANY COMBINATION: CPT

## 2020-03-15 PROCEDURE — 93005 ELECTROCARDIOGRAM TRACING: CPT

## 2020-03-15 PROCEDURE — 82962 GLUCOSE BLOOD TEST: CPT

## 2020-03-15 PROCEDURE — 99291 CRITICAL CARE FIRST HOUR: CPT | Mod: ,,, | Performed by: EMERGENCY MEDICINE

## 2020-03-15 PROCEDURE — 84484 ASSAY OF TROPONIN QUANT: CPT | Mod: 91

## 2020-03-15 PROCEDURE — 25000003 PHARM REV CODE 250: Performed by: PHYSICIAN ASSISTANT

## 2020-03-15 PROCEDURE — 82728 ASSAY OF FERRITIN: CPT

## 2020-03-15 PROCEDURE — 94761 N-INVAS EAR/PLS OXIMETRY MLT: CPT

## 2020-03-15 PROCEDURE — 99291 CRITICAL CARE FIRST HOUR: CPT | Mod: 25

## 2020-03-15 PROCEDURE — 63600175 PHARM REV CODE 636 W HCPCS: Performed by: PHYSICIAN ASSISTANT

## 2020-03-15 PROCEDURE — 87502 INFLUENZA DNA AMP PROBE: CPT

## 2020-03-15 PROCEDURE — 99291 PR CRITICAL CARE, E/M 30-74 MINUTES: ICD-10-PCS | Mod: ,,, | Performed by: EMERGENCY MEDICINE

## 2020-03-15 PROCEDURE — 99900035 HC TECH TIME PER 15 MIN (STAT)

## 2020-03-15 PROCEDURE — 85025 COMPLETE CBC W/AUTO DIFF WBC: CPT

## 2020-03-15 PROCEDURE — 93010 EKG 12-LEAD: ICD-10-PCS | Mod: ,,, | Performed by: INTERNAL MEDICINE

## 2020-03-15 PROCEDURE — 25000003 PHARM REV CODE 250: Performed by: STUDENT IN AN ORGANIZED HEALTH CARE EDUCATION/TRAINING PROGRAM

## 2020-03-15 PROCEDURE — 25000242 PHARM REV CODE 250 ALT 637 W/ HCPCS: Performed by: PHYSICIAN ASSISTANT

## 2020-03-15 PROCEDURE — 87633 RESP VIRUS 12-25 TARGETS: CPT

## 2020-03-15 PROCEDURE — 94640 AIRWAY INHALATION TREATMENT: CPT

## 2020-03-15 PROCEDURE — 20600001 HC STEP DOWN PRIVATE ROOM

## 2020-03-15 PROCEDURE — 93010 ELECTROCARDIOGRAM REPORT: CPT | Mod: ,,, | Performed by: INTERNAL MEDICINE

## 2020-03-15 PROCEDURE — 25500020 PHARM REV CODE 255: Performed by: EMERGENCY MEDICINE

## 2020-03-15 PROCEDURE — 81001 URINALYSIS AUTO W/SCOPE: CPT

## 2020-03-15 PROCEDURE — 80053 COMPREHEN METABOLIC PANEL: CPT

## 2020-03-15 PROCEDURE — 83880 ASSAY OF NATRIURETIC PEPTIDE: CPT

## 2020-03-15 RX ORDER — LOSARTAN POTASSIUM AND HYDROCHLOROTHIAZIDE 12.5; 1 MG/1; MG/1
1 TABLET ORAL DAILY
Status: DISCONTINUED | OUTPATIENT
Start: 2020-03-16 | End: 2020-03-16

## 2020-03-15 RX ORDER — IBUPROFEN 200 MG
24 TABLET ORAL
Status: DISCONTINUED | OUTPATIENT
Start: 2020-03-15 | End: 2020-03-17 | Stop reason: HOSPADM

## 2020-03-15 RX ORDER — SODIUM CHLORIDE 0.9 % (FLUSH) 0.9 %
10 SYRINGE (ML) INJECTION
Status: DISCONTINUED | OUTPATIENT
Start: 2020-03-15 | End: 2020-03-17 | Stop reason: HOSPADM

## 2020-03-15 RX ORDER — PANTOPRAZOLE SODIUM 40 MG/1
40 TABLET, DELAYED RELEASE ORAL DAILY
Status: DISCONTINUED | OUTPATIENT
Start: 2020-03-16 | End: 2020-03-17 | Stop reason: HOSPADM

## 2020-03-15 RX ORDER — GLUCAGON 1 MG
1 KIT INJECTION
Status: DISCONTINUED | OUTPATIENT
Start: 2020-03-15 | End: 2020-03-17 | Stop reason: HOSPADM

## 2020-03-15 RX ORDER — IBUPROFEN 200 MG
1 TABLET ORAL DAILY
Status: DISCONTINUED | OUTPATIENT
Start: 2020-03-16 | End: 2020-03-17 | Stop reason: HOSPADM

## 2020-03-15 RX ORDER — ALBUTEROL SULFATE 90 UG/1
2 AEROSOL, METERED RESPIRATORY (INHALATION)
Status: DISCONTINUED | OUTPATIENT
Start: 2020-03-15 | End: 2020-03-15

## 2020-03-15 RX ORDER — ALBUTEROL SULFATE 90 UG/1
2 AEROSOL, METERED RESPIRATORY (INHALATION)
Status: DISPENSED | OUTPATIENT
Start: 2020-03-15 | End: 2020-03-16

## 2020-03-15 RX ORDER — CALCIUM CARBONATE 200(500)MG
500 TABLET,CHEWABLE ORAL DAILY PRN
Status: DISCONTINUED | OUTPATIENT
Start: 2020-03-15 | End: 2020-03-17 | Stop reason: HOSPADM

## 2020-03-15 RX ORDER — PREDNISONE 20 MG/1
40 TABLET ORAL DAILY
Status: DISCONTINUED | OUTPATIENT
Start: 2020-03-16 | End: 2020-03-17 | Stop reason: HOSPADM

## 2020-03-15 RX ORDER — ALBUTEROL SULFATE 90 UG/1
2 AEROSOL, METERED RESPIRATORY (INHALATION) EVERY 6 HOURS PRN
Status: DISCONTINUED | OUTPATIENT
Start: 2020-03-15 | End: 2020-03-17 | Stop reason: HOSPADM

## 2020-03-15 RX ORDER — PREDNISONE 20 MG/1
40 TABLET ORAL DAILY
Status: DISCONTINUED | OUTPATIENT
Start: 2020-03-16 | End: 2020-03-15

## 2020-03-15 RX ORDER — AZITHROMYCIN 250 MG/1
500 TABLET, FILM COATED ORAL
Status: COMPLETED | OUTPATIENT
Start: 2020-03-15 | End: 2020-03-15

## 2020-03-15 RX ORDER — IPRATROPIUM BROMIDE AND ALBUTEROL SULFATE 2.5; .5 MG/3ML; MG/3ML
3 SOLUTION RESPIRATORY (INHALATION)
Status: COMPLETED | OUTPATIENT
Start: 2020-03-15 | End: 2020-03-15

## 2020-03-15 RX ORDER — TIOTROPIUM BROMIDE 18 UG/1
18 CAPSULE ORAL; RESPIRATORY (INHALATION) DAILY
Status: DISCONTINUED | OUTPATIENT
Start: 2020-03-16 | End: 2020-03-17 | Stop reason: HOSPADM

## 2020-03-15 RX ORDER — ENOXAPARIN SODIUM 100 MG/ML
40 INJECTION SUBCUTANEOUS EVERY 24 HOURS
Status: DISCONTINUED | OUTPATIENT
Start: 2020-03-15 | End: 2020-03-17 | Stop reason: HOSPADM

## 2020-03-15 RX ORDER — IBUPROFEN 200 MG
16 TABLET ORAL
Status: DISCONTINUED | OUTPATIENT
Start: 2020-03-15 | End: 2020-03-17 | Stop reason: HOSPADM

## 2020-03-15 RX ORDER — FLUTICASONE FUROATE AND VILANTEROL 100; 25 UG/1; UG/1
1 POWDER RESPIRATORY (INHALATION) DAILY
Status: DISCONTINUED | OUTPATIENT
Start: 2020-03-16 | End: 2020-03-17 | Stop reason: HOSPADM

## 2020-03-15 RX ORDER — IPRATROPIUM BROMIDE AND ALBUTEROL SULFATE 2.5; .5 MG/3ML; MG/3ML
3 SOLUTION RESPIRATORY (INHALATION)
Status: DISCONTINUED | OUTPATIENT
Start: 2020-03-15 | End: 2020-03-15

## 2020-03-15 RX ADMIN — PREDNISONE 50 MG: 10 TABLET ORAL at 09:03

## 2020-03-15 RX ADMIN — AZITHROMYCIN 500 MG: 250 TABLET, FILM COATED ORAL at 09:03

## 2020-03-15 RX ADMIN — IPRATROPIUM BROMIDE AND ALBUTEROL SULFATE 3 ML: .5; 3 SOLUTION RESPIRATORY (INHALATION) at 08:03

## 2020-03-15 RX ADMIN — IOHEXOL 75 ML: 350 INJECTION, SOLUTION INTRAVENOUS at 03:03

## 2020-03-15 RX ADMIN — CALCIUM CARBONATE (ANTACID) CHEW TAB 500 MG 500 MG: 500 CHEW TAB at 09:03

## 2020-03-15 NOTE — ED NOTES
Pt is a 60 yo M admitted for CC of shortness of breath.   Pt rates their pain 4/10 upon inhalation.   Pt states he was at home when he couldn't get up from his chair. Pt states he is more out of breath than usual, experiencing generalized weakness, and has an intermittent non-productive cough.   Pt endorses a Hx of COPD with a recent exacerbation 1 month ago.    Pt denies headache, chest pain, abdominal pain, fever, chills, nausea, vomiting, diarrhea, dizziness and loss of consciousness.     LOC: The patient is awake, alert, and aware of environment. The patient is oriented x 3 and speaking appropriately.   APPEARANCE: No acute distress noted.   PSYCHOSOCIAL: Patient is calm and cooperative.   SKIN: The skin is warm, dry.   RESPIRATORY: Airway is open and patent. Bilateral chest rise and fall. Respirations are spontaneous, even. Normal effort and rate noted. No accessory muscle use noted. Pt on NRB at 9L via EMS. EMS states pt saturation 75% RA at home. Pt sat 100% on 9L upon assessment.   CARDIAC: Patient has a normal rate and rhythm. Denies chest pain or SOB.   ABDOMEN: Soft and non tender to palpation. No distention noted.   URINARY:  Voids independently.   EXTREMITIES: No swelling noted.   NEUROLOGIC: Eyes open spontaneously. Speech clear. Tolerating saliva secretions well. Able to follow commands, demonstrating ability to actively and appropriately communicate within context of current conversation. Symmetrical facial muscles. Moving all extremities well. Movement is purposeful.   MUSCULOSKELETAL: No obvious deformities noted.

## 2020-03-15 NOTE — ED NOTES
Attempted to ambulate pt to hallway. Pt O2 sat 85% sitting on sit of bed without O2. Pt extremely shaky and unstable upon ambulation to doorway. O2 sat dropped to 77% upon ambulation. Pt safely placed back in bed. Pt. currently resting in bed in NAD. RR e/u. Continuous cardiac, BP, and O2 monitoring in progress. Will continue to monitor.

## 2020-03-15 NOTE — ED PROVIDER NOTES
"Encounter Date: 3/15/2020       History     Chief Complaint   Patient presents with    Shortness of Breath     COPD exacerbation, non-productive cough, weakness.     Patient is a 59-year-old male with a history of COPD uses supplemental O2 at night, HTN.  Patient states that he was on his asthma machine when he took sick" this morning.  Patient reports sudden shortness of breath, shaking "like a fever", generalized weakness.  Patient denies a cough or measured fever.  However, patient reported an intermittent nonproductive cough to the nursing staff.  She reports that he was at his baseline yesterday.  He worked.  Patient works in the kitchen.  He does not have any known exposure to presumptive were confirmed COVID-19 cases.  Denies chest pain or other acute complaints.  Per EMS, patient reports that the shaking and weakness is atypical for him. Pt received nebs via EMS and currently reports feeling "a whole lot better".          Review of patient's allergies indicates:   Allergen Reactions    Benazepril Swelling    Duoneb [ipratropium-albuterol]      Report Tremors     Past Medical History:   Diagnosis Date    Asthma     COPD (chronic obstructive pulmonary disease)     home O2 at night only    Hypertension     Pneumonia     Seizures      History reviewed. No pertinent surgical history.  Family History   Problem Relation Age of Onset    Cancer Father     Hypertension Sister     Stroke Sister     Stroke Brother     Diabetes Neg Hx     Heart disease Neg Hx      Social History     Tobacco Use    Smoking status: Current Every Day Smoker     Packs/day: 0.25     Types: Cigarettes    Smokeless tobacco: Never Used    Tobacco comment: 1-2 cigs per day   Substance Use Topics    Alcohol use: Yes     Alcohol/week: 2.0 standard drinks     Types: 2 Cans of beer per week     Comment: every night    Drug use: No     Review of Systems   Constitutional: Positive for fever (?).   HENT: Negative for sore throat.  "   Respiratory: Positive for shortness of breath. Negative for cough.    Cardiovascular: Negative for chest pain.   Gastrointestinal: Negative for nausea.   Genitourinary: Negative for dysuria.   Musculoskeletal: Negative for back pain.   Skin: Negative for rash.   Neurological: Positive for tremors and weakness.   Hematological: Does not bruise/bleed easily.       Physical Exam     Initial Vitals   BP Pulse Resp Temp SpO2   03/15/20 0732 03/15/20 0740 03/15/20 0740 03/15/20 0757 03/15/20 0732   (!) 142/89 97 19 98.5 °F (36.9 °C) 100 %      MAP       --                Physical Exam    Nursing note and vitals reviewed.  Constitutional: He appears well-developed and well-nourished.  Non-toxic appearance. He does not appear ill. No distress.   HENT:   Head: Normocephalic and atraumatic.   Neck: Normal range of motion. Neck supple.   Cardiovascular: Normal rate and regular rhythm. Exam reveals no gallop, no distant heart sounds and no friction rub.    No murmur heard.  Pulmonary/Chest: Effort normal. No accessory muscle usage. No tachypnea. No respiratory distress. He has decreased breath sounds. He has no wheezes. He has no rhonchi. He has no rales.   Abdominal: Soft. Normal appearance. He exhibits no distension. There is no tenderness. There is no rigidity and no guarding.   Neurological: He is alert.   Skin: No rash noted.         ED Course   Procedures  Labs Reviewed   CBC W/ AUTO DIFFERENTIAL - Abnormal; Notable for the following components:       Result Value    Hemoglobin 13.6 (*)     Mean Corpuscular Hemoglobin 26.9 (*)     Mean Corpuscular Hemoglobin Conc 31.5 (*)     RDW 19.7 (*)     All other components within normal limits   COMPREHENSIVE METABOLIC PANEL - Abnormal; Notable for the following components:    Glucose 53 (*)     Calcium 8.4 (*)     Albumin 3.4 (*)     AST 87 (*)     ALT 58 (*)     All other components within normal limits   URINALYSIS, REFLEX TO URINE CULTURE - Abnormal; Notable for the following  components:    Protein, UA 1+ (*)     Ketones, UA Trace (*)     Occult Blood UA 2+ (*)     All other components within normal limits    Narrative:     Preferred Collection Type->Urine, Clean Catch   TROPONIN I - Abnormal; Notable for the following components:    Troponin I 0.072 (*)     All other components within normal limits    Narrative:     troponin add on per Michelle Dickey MD @ 09:40 on 03/15/2020: order#   [id number]   TROPONIN I - Abnormal; Notable for the following components:    Troponin I 0.063 (*)     All other components within normal limits   B-TYPE NATRIURETIC PEPTIDE - Abnormal; Notable for the following components:     (*)     All other components within normal limits    Narrative:     troponin add on per Michelle Dickey PA-C @ 09:40 on 03/15/2020:   order# [id number]  Bnp add on per Michelle Dickey PA-C, @ 10:46 on 03/15/2020; order#   [id number]  DDimer  add on per Michelle Dickey PA-C @ 10:49 on 03/15/2020:   order# [id number]   D DIMER, QUANTITATIVE - Abnormal; Notable for the following components:    D-Dimer 1.60 (*)     All other components within normal limits    Narrative:     troponin add on per Michelle Dickey PA-C @ 09:40 on 03/15/2020:   order# [id number]  Bnp add on per Michelle Dcikey PA-C, @ 10:46 on 03/15/2020; order#   [id number]  DDimer  add on per Michelle Dickey PA-C @ 10:49 on 03/15/2020:   order# [id number]   ISTAT PROCEDURE - Abnormal; Notable for the following components:    POC PH 7.305 (*)     POC PCO2 66.2 (*)     POC PO2 77 (*)     POC HCO3 32.9 (*)     POC SATURATED O2 93 (*)     POC TCO2 35 (*)     All other components within normal limits   POCT GLUCOSE - Abnormal; Notable for the following components:    POCT Glucose 188 (*)     All other components within normal limits   INFLUENZA A & B BY MOLECULAR   RESPIRATORY INFECTION PANEL (PCR), NASOPHARYNGEAL    Narrative:     For all other respiratory sources, order [id number] -  Respiratory Viral Panel by PCR    TROPONIN I   B-TYPE NATRIURETIC PEPTIDE   D DIMER, QUANTITATIVE   URINALYSIS MICROSCOPIC    Narrative:     Preferred Collection Type->Urine, Clean Catch   HEMOGLOBIN A1C   IRON AND TIBC   FERRITIN   POCT GLUCOSE MONITORING CONTINUOUS     EKG Readings: (Independently Interpreted)   Initial Reading: No STEMI. Previous EKG: Compared with most recent EKG Rhythm: Normal Sinus Rhythm. Ectopy: No Ectopy. Conduction: Normal.   New inverted T-waves in V2, V3.  Biphasic T-wave in V4.  Prolonged QT.     ECG Results          EKG 12-lead (Final result)  Result time 03/15/20 21:56:01    Final result by Interface, Lab In [facility] (03/15/20 21:56:01)                 Narrative:    Test Reason : R06.02,    Vent. Rate : 091 BPM     Atrial Rate : 091 BPM     P-R Int : 178 ms          QRS Dur : 082 ms      QT Int : 436 ms       P-R-T Axes : 081 087 081 degrees     QTc Int : 536 ms    Normal sinus rhythm  T wave abnormality, consider anterior ischemia  Prolonged QT  Abnormal ECG  When compared with ECG of 29-JAN-2020 12:18,  QT has lengthened  Confirmed by MICHAEL CARRERA MD ([id number]) on 3/15/2020 9:55:54 PM    Referred By: System System           Confirmed By:MICHAEL CARRERA MD                            Imaging Results           CTA Chest Non-Coronary - PE Study (Final result)  Result time 03/15/20 16:31:06    Final result by Mckinley Talbert MD (03/15/20 16:31:06)                 Impression:      Noting limitation due to respiratory motion artifact, there is no filling defect in the pulmonary arteries to the proximal segmental level to suggest a thromboembolism.    Soft tissue opacity (0.7 cm) in the posterior right upper lobe, unchanged since prior CT 01/31/2020 but new since CT 02/04/2018.  For a solid nodule 6-8 mm, Fleischner Society 2017 guidelines recommend follow up with non-contrast chest CT at 6-12 months and 18-24 months after discovery.    Soft tissue opacity with associated band like opacity in the left lower lobe, unchanged since  CT 02/04/2018, suggestive of a benign etiology.    Layering material in the mid trachea, likely retained secretions.    Additional stable incidental findings, as above.    This report was flagged in Epic as abnormal.    Electronically signed by resident: Jeff Walters  Date:    03/15/2020  Time:    16:03    Electronically signed by: Mckinley Talbert MD  Date:    03/15/2020  Time:    16:31             Narrative:    EXAMINATION:  CTA CHEST NON CORONARY    CLINICAL HISTORY:  Chest pain, acute, PE suspected, intermed prob, positive D-dimer;    TECHNIQUE:  Low dose axial images, sagittal and coronal reformations were obtained from the thoracic inlet to the lung bases following the IV administration of 75 mL of Omnipaque 350.  Contrast timing was optimized to evaluate the pulmonary arteries.    COMPARISON:  CT chest 01/31/2020 CT chest 02/14/2018.    FINDINGS:  Pulmonary vasculature: Noting limitation due to patient respiratory motion artifact, there is no filling defect in the pulmonary arterial system to the proximal segmental level to indicate a thromboembolism.    Aorta: Left-sided aortic arch.  No aneurysm.  Mild calcification at the aortic arch.    Base of Neck: No significant abnormality.    Thoracic soft tissues: Normal.    Heart: Heart is normal in size without pericardial fluid.  Dense multi-vessel coronary atherosclerosis.  Calcification at the aortic valve annulus.    Beti/Mediastinum: No pathologic matti enlargement.    Airways: Layering material in the mid trachea, likely retained secretions.  Proximal airways are patent.    Lungs/Pleura: Lungs are symmetrically expanded without consolidation.  No pleural effusion.    Redemonstration of a soft tissue opacity in the medial basilar segment of the left lower lobe (axial series 3, image 376) with extension into a band like opacity caudally is similar to prior exam 01/31/2020 as well as CT 02/04/2018.    Additional soft tissue opacity in the posterior segment of the  right upper lobe measures 0.7 cm (axial series 3, image 164), similar to prior exam.    Esophagus: Normal.    Upper Abdomen: No abnormality of the partially imaged upper abdomen.    Bones: No acute fracture. No suspicious lytic or sclerotic lesions.                               X-Ray Chest AP Portable (Final result)  Result time 03/15/20 08:22:22    Final result by Paty Catherine MD (03/15/20 08:22:22)                 Impression:      As above.      Electronically signed by: Paty Catherine MD  Date:    03/15/2020  Time:    08:22             Narrative:    EXAMINATION:  XR CHEST AP PORTABLE    CLINICAL HISTORY:  Shortness of breath    TECHNIQUE:  Single frontal view of the chest was performed.    COMPARISON:  None    FINDINGS:  The lungs are well expanded.  Patient's known bronchiectasis better seen on previous CT scan.  There is some subtle interstitial prominence throughout the lungs.  No airspace consolidation is identified.  Heart size is normal.  Skeletal structures are intact.                                 Medical Decision Making:   History:   Old Medical Records: I decided to obtain old medical records.  Differential Diagnosis:   My differential diagnosis includes but is not limited to:  COPD exacerbation, ACS, influenza, pleural effusion  Independently Interpreted Test(s):   I have ordered and independently interpreted EKG Reading(s) - see prior notes  Clinical Tests:   Lab Tests: Ordered  Radiological Study: Ordered  Medical Tests: Ordered  Other:   I have discussed this case with another health care provider.       <> Summary of the Discussion: Hospital Medicine       APC / Resident Notes:   59-year-old male with COPD and chronic respiratory failure presents with acute shortness of breath, shaking and weakness this morning.  On my exam, patient is mildly tachycardic.  O2 sats 90-91% on 2 L via nasal cannula.  He appears in no acute respiratory distress.  Currently reports feeling better after breathing  treatments en route to the ED via EMS.  He has remained afebrile throughout ED evaluation.  No cough was noted.   Patient does not meet criteria for testing based on [facility] testing guidelines dated 3/14/20    EKG with new T-wave inversions    Venous blood gas reveals hypercapnia.  Attempted to ambulate patient without O2 supplementation.  Patient desatted to high 70s after very short walk.  Patient also reported feeling very weak.  Initial troponin was slightly elevated from previous, but 2nd troponin slightly decreased.  Dimer elevated to 1.6.  Flu negative.  Respiratory panel negative.  BNP is elevated at 574, increased from previous.  CT PE study was obtained.  No evidence for acute thromboembolism.  Right upper lobe soft tissue opacity is stable from CT obtained in January.  Bandlike opacity in the left lower lobe has been unchanged since 2018.  Based on symptoms, exam, workup feel that patient's symptoms are likely secondary to acute on chronic respiratory failure secondary to COPD exacerbation.  Patient received steroids, antibiotic coverage.  He will be admitted to Hospital Medicine for further treatment and management    I have reviewed the patient's records and discussed this case with my supervising physician. Please be advised that this text was dictated with [employer] software and may contain dictation errors.                Attending Attestation:     Physician Attestation Statement for NP/PA:   I have conducted a face to face encounter with this patient in addition to the NP/PA, due to Medical Complexity    Other NP/PA Attestation Additions:      Medical Decision Makin-year-old male history of COPD presents to the emergency department after sudden onset of shortness of breath and chills.  Patient denies rhinorrhea, sore throat, cough, chest pain or fever.    ddx ; copd exacerbation vs PE vs acs> pneumonia. No fever, no elev wbc In the ED  Hypoxic requiring nasal cannula  Hypercarbic  EKG with  T-wave inversion in anterolateral leads    An initial troponin elevated from baseline for the patient.  In the picture hypoxia no chest pain likely demand ischemia, will trend troponins  DuoNebs, prednisone for COPD exacerbation  D-dimer elevated will obtain CT PE    Dg: hypoxic and hypercarbic acute resp failure secondary to COPD exacerbation     Attending Critical Care:   Critical Care Times:   ==============================================================  · Total Critical Care Time - exclusive of procedural time: 30 minutes.  ==============================================================  Critical care reasons: acute hypoxic and hypercarbic resp failure.   Critical care was time spent personally by me on the following activities: obtaining history from patient or relative, examination of patient, review of old charts, ordering lab, x-rays, and/or EKG, development of treatment plan with patient or relative, ordering and performing treatments and interventions, evaluation of patient's response to treatment, discussion with consultants and re-evaluation of patient's conition.   Critical Care Condition: potentially life-threatening                             Clinical Impression:       ICD-10-CM ICD-9-CM   1. Acute on chronic respiratory failure with hypoxia and hypercapnia J96.21 518.84    J96.22 786.09     799.02   2. Shortness of breath R06.02 786.05   3. SOB (shortness of breath) R06.02 786.05         Disposition:   Disposition: Admitted  Condition: Fair     ED Disposition Condition    Admit                           Michelle Dickey PA-C  03/16/20 1208       Rosemarie Pérez MD  03/16/20 9790

## 2020-03-15 NOTE — LETTER
March 17, 2020         [address]  [facility] [address]  Phone: [phone]  Fax: [phone]       Patient: Baltazar Mccray   YOB: 1960  Date of Visit: 03/17/2020    To Whom It May Concern:    Andra Mccray  was at [facility] between 03/15/2020 and  03/17/2020. He may return to work on 3/20/2020 with no restrictions. If you have any questions or concerns, or if I can be of further assistance, please do not hesitate to contact me.    Sincerely,                Yrn Silverman MD

## 2020-03-15 NOTE — ED NOTES
Pt provided with urinal for urine specimen. Pt denies urge to urinate, but verbalized understanding of need to provide sample.

## 2020-03-15 NOTE — ED NOTES
First attempt to call report unsuccessful. RN did not answer spectra link. Will attempt again in 5 min.

## 2020-03-16 PROBLEM — I50.20 HFREF (HEART FAILURE WITH REDUCED EJECTION FRACTION): Status: ACTIVE | Noted: 2020-03-16

## 2020-03-16 PROBLEM — R10.9 ABDOMINAL PAIN: Status: ACTIVE | Noted: 2020-03-16

## 2020-03-16 LAB
ALBUMIN SERPL BCP-MCNC: 3.2 G/DL (ref 3.5–5.2)
ALBUMIN SERPL BCP-MCNC: 3.8 G/DL (ref 3.5–5.2)
ALP SERPL-CCNC: 115 U/L (ref 55–135)
ALP SERPL-CCNC: 139 U/L (ref 55–135)
ALT SERPL W/O P-5'-P-CCNC: 52 U/L (ref 10–44)
ALT SERPL W/O P-5'-P-CCNC: 61 U/L (ref 10–44)
ANION GAP SERPL CALC-SCNC: 11 MMOL/L (ref 8–16)
ANION GAP SERPL CALC-SCNC: 13 MMOL/L (ref 8–16)
AST SERPL-CCNC: 59 U/L (ref 10–40)
AST SERPL-CCNC: 64 U/L (ref 10–40)
AV INDEX (PROSTH): 0.65
AV MEAN GRADIENT: 2 MMHG
AV PEAK GRADIENT: 5 MMHG
AV VALVE AREA: 2.4 CM2
AV VELOCITY RATIO: 0.66
BASOPHILS # BLD AUTO: 0.01 K/UL (ref 0–0.2)
BASOPHILS # BLD AUTO: 0.02 K/UL (ref 0–0.2)
BASOPHILS NFR BLD: 0.2 % (ref 0–1.9)
BASOPHILS NFR BLD: 0.2 % (ref 0–1.9)
BILIRUB SERPL-MCNC: 0.5 MG/DL (ref 0.1–1)
BILIRUB SERPL-MCNC: 0.6 MG/DL (ref 0.1–1)
BSA FOR ECHO PROCEDURE: 1.64 M2
BUN SERPL-MCNC: 12 MG/DL (ref 6–20)
BUN SERPL-MCNC: 15 MG/DL (ref 6–20)
CALCIUM SERPL-MCNC: 8.9 MG/DL (ref 8.7–10.5)
CALCIUM SERPL-MCNC: 9.9 MG/DL (ref 8.7–10.5)
CHLORIDE SERPL-SCNC: 93 MMOL/L (ref 95–110)
CHLORIDE SERPL-SCNC: 97 MMOL/L (ref 95–110)
CO2 SERPL-SCNC: 27 MMOL/L (ref 23–29)
CO2 SERPL-SCNC: 32 MMOL/L (ref 23–29)
CREAT SERPL-MCNC: 0.7 MG/DL (ref 0.5–1.4)
CREAT SERPL-MCNC: 1.1 MG/DL (ref 0.5–1.4)
CV ECHO LV RWT: 0.27 CM
DIFFERENTIAL METHOD: ABNORMAL
DIFFERENTIAL METHOD: ABNORMAL
DOP CALC AO PEAK VEL: 1.1 M/S
DOP CALC AO VTI: 17.92 CM
DOP CALC LVOT AREA: 3.7 CM2
DOP CALC LVOT DIAMETER: 2.16 CM
DOP CALC LVOT PEAK VEL: 0.73 M/S
DOP CALC LVOT STROKE VOLUME: 42.96 CM3
DOP CALCLVOT PEAK VEL VTI: 11.73 CM
E WAVE DECELERATION TIME: 173.34 MSEC
E/A RATIO: 0.89
E/E' RATIO: 7.86 M/S
ECHO LV POSTERIOR WALL: 0.68 CM (ref 0.6–1.1)
EOSINOPHIL # BLD AUTO: 0 K/UL (ref 0–0.5)
EOSINOPHIL # BLD AUTO: 0 K/UL (ref 0–0.5)
EOSINOPHIL NFR BLD: 0 % (ref 0–8)
EOSINOPHIL NFR BLD: 0 % (ref 0–8)
ERYTHROCYTE [DISTWIDTH] IN BLOOD BY AUTOMATED COUNT: 18.9 % (ref 11.5–14.5)
ERYTHROCYTE [DISTWIDTH] IN BLOOD BY AUTOMATED COUNT: 20.1 % (ref 11.5–14.5)
EST. GFR  (AFRICAN AMERICAN): >60 ML/MIN/1.73 M^2
EST. GFR  (AFRICAN AMERICAN): >60 ML/MIN/1.73 M^2
EST. GFR  (NON AFRICAN AMERICAN): >60 ML/MIN/1.73 M^2
EST. GFR  (NON AFRICAN AMERICAN): >60 ML/MIN/1.73 M^2
FRACTIONAL SHORTENING: 29 % (ref 28–44)
GLUCOSE SERPL-MCNC: 106 MG/DL (ref 70–110)
GLUCOSE SERPL-MCNC: 112 MG/DL (ref 70–110)
HCT VFR BLD AUTO: 46.8 % (ref 40–54)
HCT VFR BLD AUTO: 54.3 % (ref 40–54)
HGB BLD-MCNC: 14.9 G/DL (ref 14–18)
HGB BLD-MCNC: 17.3 G/DL (ref 14–18)
IMM GRANULOCYTES # BLD AUTO: 0.01 K/UL (ref 0–0.04)
IMM GRANULOCYTES # BLD AUTO: 0.06 K/UL (ref 0–0.04)
IMM GRANULOCYTES NFR BLD AUTO: 0.2 % (ref 0–0.5)
IMM GRANULOCYTES NFR BLD AUTO: 0.6 % (ref 0–0.5)
INTERVENTRICULAR SEPTUM: 0.66 CM (ref 0.6–1.1)
IVRT: 137.01 MSEC
LA MAJOR: 4.88 CM
LA MINOR: 5.01 CM
LA WIDTH: 4.67 CM
LACTATE SERPL-SCNC: 3.1 MMOL/L (ref 0.5–2.2)
LACTATE SERPL-SCNC: 5.1 MMOL/L (ref 0.5–2.2)
LEFT ATRIUM SIZE: 3.6 CM
LEFT ATRIUM VOLUME INDEX: 42.4 ML/M2
LEFT ATRIUM VOLUME: 70.65 CM3
LEFT INTERNAL DIMENSION IN SYSTOLE: 3.52 CM (ref 2.1–4)
LEFT VENTRICLE DIASTOLIC VOLUME INDEX: 69.22 ML/M2
LEFT VENTRICLE DIASTOLIC VOLUME: 115.46 ML
LEFT VENTRICLE MASS INDEX: 64 G/M2
LEFT VENTRICLE SYSTOLIC VOLUME INDEX: 31 ML/M2
LEFT VENTRICLE SYSTOLIC VOLUME: 51.66 ML
LEFT VENTRICULAR INTERNAL DIMENSION IN DIASTOLE: 4.95 CM (ref 3.5–6)
LEFT VENTRICULAR MASS: 106.74 G
LIPASE SERPL-CCNC: 14 U/L (ref 4–60)
LV LATERAL E/E' RATIO: 6.88 M/S
LV SEPTAL E/E' RATIO: 9.17 M/S
LYMPHOCYTES # BLD AUTO: 0.4 K/UL (ref 1–4.8)
LYMPHOCYTES # BLD AUTO: 0.7 K/UL (ref 1–4.8)
LYMPHOCYTES NFR BLD: 7 % (ref 18–48)
LYMPHOCYTES NFR BLD: 8.2 % (ref 18–48)
MAGNESIUM SERPL-MCNC: 1.7 MG/DL (ref 1.6–2.6)
MAGNESIUM SERPL-MCNC: 2.1 MG/DL (ref 1.6–2.6)
MCH RBC QN AUTO: 26.9 PG (ref 27–31)
MCH RBC QN AUTO: 26.9 PG (ref 27–31)
MCHC RBC AUTO-ENTMCNC: 31.8 G/DL (ref 32–36)
MCHC RBC AUTO-ENTMCNC: 31.9 G/DL (ref 32–36)
MCV RBC AUTO: 84 FL (ref 82–98)
MCV RBC AUTO: 85 FL (ref 82–98)
MONOCYTES # BLD AUTO: 0.5 K/UL (ref 0.3–1)
MONOCYTES # BLD AUTO: 0.8 K/UL (ref 0.3–1)
MONOCYTES NFR BLD: 8.2 % (ref 4–15)
MONOCYTES NFR BLD: 9.9 % (ref 4–15)
MV PEAK A VEL: 0.62 M/S
MV PEAK E VEL: 0.55 M/S
NEUTROPHILS # BLD AUTO: 4 K/UL (ref 1.8–7.7)
NEUTROPHILS # BLD AUTO: 8.5 K/UL (ref 1.8–7.7)
NEUTROPHILS NFR BLD: 81.5 % (ref 38–73)
NEUTROPHILS NFR BLD: 84 % (ref 38–73)
NRBC BLD-RTO: 0 /100 WBC
NRBC BLD-RTO: 0 /100 WBC
PHOSPHATE SERPL-MCNC: 1.6 MG/DL (ref 2.7–4.5)
PHOSPHATE SERPL-MCNC: 2.8 MG/DL (ref 2.7–4.5)
PISA TR MAX VEL: 1.85 M/S
PLATELET # BLD AUTO: 245 K/UL (ref 150–350)
PLATELET # BLD AUTO: 309 K/UL (ref 150–350)
PMV BLD AUTO: 10.1 FL (ref 9.2–12.9)
PMV BLD AUTO: 10.4 FL (ref 9.2–12.9)
POTASSIUM SERPL-SCNC: 4.3 MMOL/L (ref 3.5–5.1)
POTASSIUM SERPL-SCNC: 4.7 MMOL/L (ref 3.5–5.1)
PROT SERPL-MCNC: 7.5 G/DL (ref 6–8.4)
PROT SERPL-MCNC: 9 G/DL (ref 6–8.4)
PULM VEIN S/D RATIO: 1.28
PV PEAK D VEL: 0.4 M/S
PV PEAK S VEL: 0.51 M/S
RA MAJOR: 4.17 CM
RA PRESSURE: 8 MMHG
RA WIDTH: 3.36 CM
RBC # BLD AUTO: 5.54 M/UL (ref 4.6–6.2)
RBC # BLD AUTO: 6.43 M/UL (ref 4.6–6.2)
RIGHT VENTRICULAR END-DIASTOLIC DIMENSION: 3.35 CM
RV TISSUE DOPPLER FREE WALL SYSTOLIC VELOCITY 1 (APICAL 4 CHAMBER VIEW): 9.51 CM/S
SINUS: 3.51 CM
SODIUM SERPL-SCNC: 136 MMOL/L (ref 136–145)
SODIUM SERPL-SCNC: 137 MMOL/L (ref 136–145)
STJ: 3.18 CM
TDI LATERAL: 0.08 M/S
TDI SEPTAL: 0.06 M/S
TDI: 0.07 M/S
TR MAX PG: 14 MMHG
TRICUSPID ANNULAR PLANE SYSTOLIC EXCURSION: 1.68 CM
TROPONIN I SERPL DL<=0.01 NG/ML-MCNC: 0.12 NG/ML (ref 0–0.03)
TV REST PULMONARY ARTERY PRESSURE: 22 MMHG
WBC # BLD AUTO: 10.16 K/UL (ref 3.9–12.7)
WBC # BLD AUTO: 4.86 K/UL (ref 3.9–12.7)

## 2020-03-16 PROCEDURE — 20600001 HC STEP DOWN PRIVATE ROOM

## 2020-03-16 PROCEDURE — 25000242 PHARM REV CODE 250 ALT 637 W/ HCPCS: Performed by: STUDENT IN AN ORGANIZED HEALTH CARE EDUCATION/TRAINING PROGRAM

## 2020-03-16 PROCEDURE — 25000003 PHARM REV CODE 250: Performed by: STUDENT IN AN ORGANIZED HEALTH CARE EDUCATION/TRAINING PROGRAM

## 2020-03-16 PROCEDURE — 83605 ASSAY OF LACTIC ACID: CPT

## 2020-03-16 PROCEDURE — 84484 ASSAY OF TROPONIN QUANT: CPT

## 2020-03-16 PROCEDURE — 83735 ASSAY OF MAGNESIUM: CPT

## 2020-03-16 PROCEDURE — 99223 1ST HOSP IP/OBS HIGH 75: CPT | Mod: ,,, | Performed by: STUDENT IN AN ORGANIZED HEALTH CARE EDUCATION/TRAINING PROGRAM

## 2020-03-16 PROCEDURE — 87040 BLOOD CULTURE FOR BACTERIA: CPT | Mod: 59

## 2020-03-16 PROCEDURE — 93010 EKG 12-LEAD: ICD-10-PCS | Mod: ,,, | Performed by: INTERNAL MEDICINE

## 2020-03-16 PROCEDURE — 63600175 PHARM REV CODE 636 W HCPCS: Performed by: STUDENT IN AN ORGANIZED HEALTH CARE EDUCATION/TRAINING PROGRAM

## 2020-03-16 PROCEDURE — 83690 ASSAY OF LIPASE: CPT

## 2020-03-16 PROCEDURE — 80053 COMPREHEN METABOLIC PANEL: CPT

## 2020-03-16 PROCEDURE — 36415 COLL VENOUS BLD VENIPUNCTURE: CPT

## 2020-03-16 PROCEDURE — 85025 COMPLETE CBC W/AUTO DIFF WBC: CPT | Mod: 91

## 2020-03-16 PROCEDURE — 93010 ELECTROCARDIOGRAM REPORT: CPT | Mod: ,,, | Performed by: INTERNAL MEDICINE

## 2020-03-16 PROCEDURE — 84100 ASSAY OF PHOSPHORUS: CPT

## 2020-03-16 PROCEDURE — 93005 ELECTROCARDIOGRAM TRACING: CPT

## 2020-03-16 PROCEDURE — 99223 PR INITIAL HOSPITAL CARE,LEVL III: ICD-10-PCS | Mod: ,,, | Performed by: STUDENT IN AN ORGANIZED HEALTH CARE EDUCATION/TRAINING PROGRAM

## 2020-03-16 PROCEDURE — 83735 ASSAY OF MAGNESIUM: CPT | Mod: 91

## 2020-03-16 PROCEDURE — 84100 ASSAY OF PHOSPHORUS: CPT | Mod: 91

## 2020-03-16 PROCEDURE — 80053 COMPREHEN METABOLIC PANEL: CPT | Mod: 91

## 2020-03-16 PROCEDURE — S4991 NICOTINE PATCH NONLEGEND: HCPCS | Performed by: STUDENT IN AN ORGANIZED HEALTH CARE EDUCATION/TRAINING PROGRAM

## 2020-03-16 RX ORDER — FUROSEMIDE 10 MG/ML
40 INJECTION INTRAMUSCULAR; INTRAVENOUS ONCE
Status: COMPLETED | OUTPATIENT
Start: 2020-03-16 | End: 2020-03-16

## 2020-03-16 RX ORDER — SODIUM,POTASSIUM PHOSPHATES 280-250MG
2 POWDER IN PACKET (EA) ORAL EVERY 4 HOURS
Status: DISCONTINUED | OUTPATIENT
Start: 2020-03-16 | End: 2020-03-17

## 2020-03-16 RX ORDER — SENNOSIDES 8.6 MG/1
8.6 TABLET ORAL DAILY PRN
Status: DISCONTINUED | OUTPATIENT
Start: 2020-03-16 | End: 2020-03-17 | Stop reason: HOSPADM

## 2020-03-16 RX ORDER — POLYETHYLENE GLYCOL 3350 17 G/17G
17 POWDER, FOR SOLUTION ORAL DAILY
Status: DISCONTINUED | OUTPATIENT
Start: 2020-03-16 | End: 2020-03-17 | Stop reason: HOSPADM

## 2020-03-16 RX ORDER — LORAZEPAM/0.9% SODIUM CHLORIDE 100MG/0.1L
2 PLASTIC BAG, INJECTION (ML) INTRAVENOUS ONCE
Status: COMPLETED | OUTPATIENT
Start: 2020-03-16 | End: 2020-03-16

## 2020-03-16 RX ORDER — ISOSORBIDE DINITRATE AND HYDRALAZINE HYDROCHLORIDE 37.5; 2 MG/1; MG/1
1 TABLET ORAL 2 TIMES DAILY
Status: DISCONTINUED | OUTPATIENT
Start: 2020-03-16 | End: 2020-03-17

## 2020-03-16 RX ORDER — FUROSEMIDE 20 MG/1
20 TABLET ORAL DAILY
Status: DISCONTINUED | OUTPATIENT
Start: 2020-03-17 | End: 2020-03-17

## 2020-03-16 RX ORDER — FLUTICASONE PROPIONATE AND SALMETEROL XINAFOATE 45; 21 UG/1; UG/1
2 AEROSOL, METERED RESPIRATORY (INHALATION) EVERY 12 HOURS
Status: ON HOLD | COMMUNITY
End: 2020-04-23 | Stop reason: SDUPTHER

## 2020-03-16 RX ADMIN — ENOXAPARIN SODIUM 40 MG: 100 INJECTION SUBCUTANEOUS at 04:03

## 2020-03-16 RX ADMIN — ALBUTEROL SULFATE 2 PUFF: 90 AEROSOL, METERED RESPIRATORY (INHALATION) at 09:03

## 2020-03-16 RX ADMIN — POLYETHYLENE GLYCOL 3350 17 G: 17 POWDER, FOR SOLUTION ORAL at 02:03

## 2020-03-16 RX ADMIN — CALCIUM CARBONATE (ANTACID) CHEW TAB 500 MG 500 MG: 500 CHEW TAB at 05:03

## 2020-03-16 RX ADMIN — FLUTICASONE FUROATE AND VILANTEROL TRIFENATATE 1 PUFF: 100; 25 POWDER RESPIRATORY (INHALATION) at 09:03

## 2020-03-16 RX ADMIN — FUROSEMIDE 40 MG: 10 INJECTION, SOLUTION INTRAMUSCULAR; INTRAVENOUS at 04:03

## 2020-03-16 RX ADMIN — PREDNISONE 40 MG: 20 TABLET ORAL at 09:03

## 2020-03-16 RX ADMIN — ALBUTEROL SULFATE 2 PUFF: 90 AEROSOL, METERED RESPIRATORY (INHALATION) at 12:03

## 2020-03-16 RX ADMIN — POTASSIUM & SODIUM PHOSPHATES POWDER PACK 280-160-250 MG 2 PACKET: 280-160-250 PACK at 11:03

## 2020-03-16 RX ADMIN — NICOTINE 1 PATCH: 14 PATCH TRANSDERMAL at 09:03

## 2020-03-16 RX ADMIN — HYDRALAZINE HYDROCHLORIDE AND ISOSORBIDE DINITRATE 1 TABLET: 37.5; 2 TABLET, FILM COATED ORAL at 09:03

## 2020-03-16 RX ADMIN — TIOTROPIUM BROMIDE 18 MCG: 18 CAPSULE ORAL; RESPIRATORY (INHALATION) at 10:03

## 2020-03-16 RX ADMIN — MAGNESIUM SULFATE HEPTAHYDRATE 2 G: 40 INJECTION, SOLUTION INTRAVENOUS at 10:03

## 2020-03-16 RX ADMIN — PANTOPRAZOLE SODIUM 40 MG: 40 TABLET, DELAYED RELEASE ORAL at 09:03

## 2020-03-16 RX ADMIN — ALBUTEROL SULFATE 2 PUFF: 90 AEROSOL, METERED RESPIRATORY (INHALATION) at 05:03

## 2020-03-16 RX ADMIN — ALBUTEROL SULFATE 2 PUFF: 90 AEROSOL, METERED RESPIRATORY (INHALATION) at 04:03

## 2020-03-16 RX ADMIN — PROMETHAZINE HYDROCHLORIDE 6.25 MG: 25 INJECTION INTRAMUSCULAR; INTRAVENOUS at 12:03

## 2020-03-16 RX ADMIN — LOSARTAN POTASSIUM AND HYDROCHLOROTHIAZIDE 1 TABLET: 100; 12.5 TABLET, FILM COATED ORAL at 09:03

## 2020-03-16 NOTE — SUBJECTIVE & OBJECTIVE
Past Medical History:   Diagnosis Date    Asthma     COPD (chronic obstructive pulmonary disease)     home O2 at night only    Hypertension     Pneumonia     Seizures        History reviewed. No pertinent surgical history.    Review of patient's allergies indicates:   Allergen Reactions    Benazepril Swelling    Duoneb [ipratropium-albuterol]      Report Tremors       No current facility-administered medications on file prior to encounter.      Current Outpatient Medications on File Prior to Encounter   Medication Sig    fluticasone propion-salmeterol 45-21 mcg/dose (ADVAIR HFA) 45-21 mcg/actuation HFAA inhaler Inhale 2 puffs into the lungs every 12 (twelve) hours. Controller    ipratropium-albuterol (COMBIVENT)  mcg/actuation inhaler Inhale 1 puff into the lungs every 6 (six) hours as needed for Wheezing. Rescue    losartan-hydrochlorothiazide 100-12.5 mg (HYZAAR) 100-12.5 mg Tab Take 1 tablet by mouth once daily. (Patient taking differently: Take 1 tablet by mouth every other day. )    tiotropium (SPIRIVA) 18 mcg inhalation capsule Inhale 1 capsule (18 mcg total) into the lungs once daily. Controller    [DISCONTINUED] omeprazole (PRILOSEC) 20 MG capsule Take 1 capsule (20 mg total) by mouth once daily.    albuterol (PROVENTIL HFA) 90 mcg/actuation inhaler Inhale 2 puffs into the lungs every 6 (six) hours as needed for Wheezing. Rescue    [DISCONTINUED] fluticasone-salmeterol diskus inhaler 250-50 mcg Inhale 1 puff into the lungs 2 (two) times daily. Controller     Family History     Problem Relation (Age of Onset)    Cancer Father    Hypertension Sister    Stroke Sister, Brother        Tobacco Use    Smoking status: Current Every Day Smoker     Packs/day: 0.25     Types: Cigarettes    Smokeless tobacco: Never Used    Tobacco comment: 1-2 cigs per day   Substance and Sexual Activity    Alcohol use: Yes     Alcohol/week: 2.0 standard drinks     Types: 2 Cans of beer per week     Comment:  every night    Drug use: No    Sexual activity: Not on file     Review of Systems   Constitution: Negative for chills and fever.   Cardiovascular: Positive for dyspnea on exertion. Negative for chest pain, claudication, irregular heartbeat, leg swelling, orthopnea, palpitations and paroxysmal nocturnal dyspnea.   Gastrointestinal: Positive for abdominal pain. Negative for nausea and vomiting.     Objective:     Vital Signs (Most Recent):  Temp: 97.9 °F (36.6 °C) (03/16/20 1623)  Pulse: 92 (03/16/20 1639)  Resp: 18 (03/16/20 1639)  BP: (!) 154/98 (03/16/20 1623)  SpO2: 97 % (03/16/20 1623) Vital Signs (24h Range):  Temp:  [96.4 °F (35.8 °C)-98.5 °F (36.9 °C)] 97.9 °F (36.6 °C)  Pulse:  [66-92] 92  Resp:  [16-19] 18  SpO2:  [94 %-99 %] 97 %  BP: (152-172)/() 154/98     Weight: 56.2 kg (124 lb)  Body mass index is 18.85 kg/m².    SpO2: 97 %  O2 Device (Oxygen Therapy): nasal cannula      Intake/Output Summary (Last 24 hours) at 3/16/2020 1640  Last data filed at 3/16/2020 1233  Gross per 24 hour   Intake 340 ml   Output 300 ml   Net 40 ml       Lines/Drains/Airways     Peripheral Intravenous Line                 Peripheral IV - Single Lumen 03/15/20 0720 20 G Left Forearm 1 day                Physical Exam   Constitutional: He is oriented to person, place, and time. No distress.   Resting comfortably in bed with NC in placea   HENT:   Head: Normocephalic and atraumatic.   Mouth/Throat: Oropharynx is clear and moist. No oropharyngeal exudate.   Poor dentition, MM moist   Eyes: Pupils are equal, round, and reactive to light. EOM are normal.   Neck: Normal range of motion. Neck supple. No JVD present.   No JVD or HJR   Cardiovascular: Normal rate, regular rhythm and intact distal pulses.   No murmur heard.  Pulmonary/Chest: Effort normal and breath sounds normal. No respiratory distress. He has no wheezes. He has no rales.   Abdominal: Soft. Bowel sounds are normal. There is tenderness (LLQ). There is no rebound  and no guarding.   Musculoskeletal: Normal range of motion. He exhibits no edema.   Neurological: He is alert and oriented to person, place, and time.   Skin: Skin is warm and dry. He is not diaphoretic.       Significant Labs: All pertinent lab results from the last 24 hours have been reviewed.    Significant Imaging: All pertinent imaging from last 24 have been reviewed

## 2020-03-16 NOTE — H&P
"[facility] Hospital Medicine  History & Physical    Patient Name: Baltazar Mccray  MRN: [id number]  Admission Date: 3/15/2020  Attending Physician: Manuel Gutierrez MD   Primary Care Provider: [facility] Medicine Team: Networked reference to record PCT  Yrn Silverman MD     Patient information was obtained from patient, past medical records and ER records.     Subjective:     Principal Problem:Acute on chronic respiratory failure with hypoxia and hypercapnia    Chief Complaint:   Chief Complaint   Patient presents with    Shortness of Breath     COPD exacerbation, non-productive cough, weakness.        HPI: Mr. Mccray is 59 years old  male patient with past medical history of COPD on home oxygen 2 L nightly, asthma, seizure disorder (last seizure 4 years). Presents to the ED with shortness of breath that started suddenly this morning while asleep on oxygen and "feeling sick" that prompted him to come the ED. Patient endorses nausea without vomiting. Patient states that sometimes he wakes up at night with sob. He sleeps on one pillow. Patient denies cough, upper respiratory symptoms, chest pain, chills, fever, palpitation, lightheadedness, lower extremity swelling, and abdominal pain. Patient denies sick contacts and recent travel. Patient states that he uses his control inhalers regularly and rescue inhaler 4 times a day. He noticed increase use of his rescue inhalers recently. Patient has multiple admissions for COPD exacerbation with most recent one in Feb 2020. Patient drinks couple of beers daily.    In the ED patient was found sating 85% at rest and 77% upon ambulation on room air, afebrile, HR 99, /89. VBG 7.305/66.2/32.9. No leukocytosis, hb 13.6, d-dimer 1.6, AST 87, ALT57, alk phos 121, .  Chest x ray showed hyperinflated lungs without vascular congestion. Respiratory infectious panel and flu are negative. CTA did not " show thromoebolism and showed stable pulmonary nodule. Placed on 9L and weaned to 2L with sats of 95%. Patient given duonebs, prednisone, and azithromycin and admitted to hospital medicine for further management.      Past Medical History:   Diagnosis Date    Asthma     COPD (chronic obstructive pulmonary disease)     home O2 at night only    Hypertension     Pneumonia     Seizures        History reviewed. No pertinent surgical history.    Review of patient's allergies indicates:   Allergen Reactions    Benazepril Swelling    Duoneb [ipratropium-albuterol]      Report Tremors       No current facility-administered medications on file prior to encounter.      Current Outpatient Medications on File Prior to Encounter   Medication Sig    albuterol (PROVENTIL HFA) 90 mcg/actuation inhaler Inhale 2 puffs into the lungs every 6 (six) hours as needed for Wheezing. Rescue    fluticasone-salmeterol diskus inhaler 250-50 mcg Inhale 1 puff into the lungs 2 (two) times daily. Controller    ipratropium-albuterol (COMBIVENT)  mcg/actuation inhaler Inhale 1 puff into the lungs every 6 (six) hours as needed for Wheezing. Rescue    losartan-hydrochlorothiazide 100-12.5 mg (HYZAAR) 100-12.5 mg Tab Take 1 tablet by mouth once daily.    omeprazole (PRILOSEC) 20 MG capsule Take 1 capsule (20 mg total) by mouth once daily.    tiotropium (SPIRIVA) 18 mcg inhalation capsule Inhale 1 capsule (18 mcg total) into the lungs once daily. Controller     Family History     Problem Relation (Age of Onset)    Cancer Father    Hypertension Sister    Stroke Sister, Brother        Tobacco Use    Smoking status: Current Every Day Smoker     Packs/day: 0.25     Types: Cigarettes    Smokeless tobacco: Never Used    Tobacco comment: 1-2 cigs per day   Substance and Sexual Activity    Alcohol use: No    Drug use: No    Sexual activity: Not on file     Review of Systems   Constitutional: Positive for fatigue. Negative for chills,  diaphoresis and fever.   HENT: Negative for congestion, rhinorrhea and sore throat.    Respiratory: Positive for shortness of breath. Negative for cough and chest tightness.    Cardiovascular: Negative for chest pain, palpitations and leg swelling.   Gastrointestinal: Positive for nausea. Negative for abdominal distention, abdominal pain, blood in stool and vomiting.   Endocrine: Negative for polyuria.   Genitourinary: Negative for decreased urine volume, dysuria, flank pain and hematuria.   Skin: Negative for rash.   Allergic/Immunologic: Negative for food allergies and immunocompromised state.   Neurological: Positive for tremors. Negative for dizziness, seizures and light-headedness.   Psychiatric/Behavioral: Negative for confusion.     Objective:     Vital Signs (Most Recent):  Temp: 97.9 °F (36.6 °C) (03/15/20 0859)  Pulse: 99 (03/15/20 1606)  Resp: 19 (03/15/20 1435)  BP: (!) 155/72 (03/15/20 1606)  SpO2: 95 % (03/15/20 1606) Vital Signs (24h Range):  Temp:  [97.9 °F (36.6 °C)-98.5 °F (36.9 °C)] 97.9 °F (36.6 °C)  Pulse:  [] 99  Resp:  [14-33] 19  SpO2:  [92 %-100 %] 95 %  BP: (124-159)/(72-98) 155/72        There is no height or weight on file to calculate BMI.    Physical Exam   Constitutional: He is oriented to person, place, and time.   HENT:   Head: Normocephalic and atraumatic.   Mouth/Throat: Abnormal dentition.   Eyes: Pupils are equal, round, and reactive to light. EOM are normal.   Cardiovascular: Normal rate, regular rhythm, normal heart sounds and intact distal pulses. Exam reveals no gallop and no friction rub.   No murmur heard.  Pulmonary/Chest: No respiratory distress. He has decreased breath sounds. He has rales in the left lower field.   Musculoskeletal: He exhibits no edema or tenderness.   Neurological: He is alert and oriented to person, place, and time. He displays tremor. GCS eye subscore is 4. GCS verbal subscore is 5. GCS motor subscore is 6.   Nursing note and vitals  reviewed.        CRANIAL NERVES     CN III, IV, VI   Pupils are equal, round, and reactive to light.  Extraocular motions are normal.        Significant Labs: All pertinent labs within the past 24 hours have been reviewed.    Significant Imaging: I have reviewed all pertinent imaging results/findings within the past 24 hours.    Assessment/Plan:     * Acute on chronic respiratory failure with hypoxia and hypercapnia  59 years old  male patient with past medical history of COPD on home oxygen 2 L nightly, asthma, seizure disorder (last seizure 4 years). Presents to the ED with shortness of breath that started suddenly this morning while asleep on oxygen. Patient denies cough, upper respiratory symptoms, chest pain, and recent sick contacts. He is afebrile with no leukocytosis  Was started on high flow o2 9L and weaned to 2L. VB.305/66/32. D-dimer 1.60. EKG without major ST changes  CXR: No acute cardio-pulmonary process. CTA no pulmonary thromboembolism  Given 1X dose of duonebs, prednisone, and IV azithromycin in the ED     PLAN:  --  O2 to tolerance patient should be satting between 88-92%.   -- Elio-nebs Q4H while awake.  -- Incentive spirometry   -- Short course of prednisone 40 mg QD for 4 days - patient had bedside delivery of remaining dosages  -- Continue control inhaler, spiriva and breo elipta (patient uses advar at home)  -- Flu and RVP are negative  -- Echo in Aug 2018 showed Normal left ventricular systolic function (EF 60-65%). Indeterminate LV diastolic function. Will repeat echo given sob at nights and BNP of 500s.      Transaminitis  Patient reports drinking 2 beers daily. Previously on last admission reported 3 beers and 2 shots of liquor nightly and has been doing so for over a year  LFTs elevated but stable compared to previous ones: AST 87, ALT 58, ALK phos 121    -- CIWA checks due to being a daily drinker and having a history of seizures.  -- seizure precautions.      Tobacco  abuse  Pt counseled on effect of smoking on lung disease and risk of cancer     - Nicotine patch      Elevated troponin  At admission 0.072, trended down 0.072      Hx of seizure disorder  Last seizure 3-4 years ago per patient. Previously on Keppra    - seizure precations      Essential hypertension  -- Continue home Losartan-HCTZ.      Lung nodule  Unchanged 0.7 cm nodule in CTA          VTE Risk Mitigation (From admission, onward)         Ordered     enoxaparin injection 40 mg  Daily      03/15/20 1907     IP VTE HIGH RISK PATIENT  Once      03/15/20 1907                   Yrn Silverman MD  Department of Hospital Medicine   [facility]

## 2020-03-16 NOTE — PLAN OF CARE
03/16/20 1009   Discharge Assessment   Assessment Type Discharge Planning Assessment   Assessment information obtained from? Medical Record   Expected Length of Stay (days) 2   Prior to hospitilization cognitive status: Alert/Oriented   Prior to hospitalization functional status: Independent   Current cognitive status: Alert/Oriented   Current Functional Status: Independent   Lives With alone   Able to Return to Prior Arrangements yes   Is patient able to care for self after discharge? Yes   Who are your caregiver(s) and their phone number(s)? s, Gladis and Viry Mccray   Patient's perception of discharge disposition home or selfcare   Readmission Within the Last 30 Days no previous admission in last 30 days   Patient currently being followed by outpatient case management? No   Patient currently receives any other outside agency services? No   Equipment Currently Used at Home oxygen   Do you have any problems affording any of your prescribed medications? TBD   Is the patient taking medications as prescribed? yes   Does the patient have transportation home? Yes   Transportation Anticipated family or friend will provide   Does the patient receive services at the [facility]mad[facility] Clinic? No   Discharge Plan A Home   DME Needed Upon Discharge  none   Admitted with Resp failure - COPD. Lives alone and is independent in her ADLs. Plan is to DC home. No DC needs identified.

## 2020-03-16 NOTE — PHARMACY MED REC
"  Admission Medication Reconciliation - Pharmacy Consult Note    The home medication history was taken by Tiera Cortes CPhT.  Based on information gathered and subsequent review by the clinical pharmacist, the items below may need attention.    You may go to "Admission" then "Reconcile Home Medications" tabs to review and/or act upon these items.        No issues noted with the medication reconciliation.      Please address this information as you see fit.  Feel free to contact us if you have any questions or require assistance.    Crystal Villanueva, PharmD  PGY-2 Pharmacy Resident- Internal Medicine  [phone] [id number]    Current Outpatient Medications on File Prior to Encounter   Medication Sig Dispense Refill Last Dose    fluticasone propion-salmeterol 45-21 mcg/dose (ADVAIR HFA) 45-21 mcg/actuation HFAA inhaler Inhale 2 puffs into the lungs every 12 (twelve) hours. Controller       ipratropium-albuterol (COMBIVENT)  mcg/actuation inhaler Inhale 1 puff into the lungs every 6 (six) hours as needed for Wheezing. Rescue 4 g 11 3/15/2020    losartan-hydrochlorothiazide 100-12.5 mg (HYZAAR) 100-12.5 mg Tab Take 1 tablet by mouth once daily. (Patient taking differently: Take 1 tablet by mouth every other day. ) 90 tablet 3 3/15/2020    tiotropium (SPIRIVA) 18 mcg inhalation capsule Inhale 1 capsule (18 mcg total) into the lungs once daily. Controller 30 capsule 11 3/15/2020    [DISCONTINUED] omeprazole (PRILOSEC) 20 MG capsule Take 1 capsule (20 mg total) by mouth once daily. 30 capsule 1 3/15/2020    albuterol (PROVENTIL HFA) 90 mcg/actuation inhaler Inhale 2 puffs into the lungs every 6 (six) hours as needed for Wheezing. Rescue 18 g 11     [DISCONTINUED] fluticasone-salmeterol diskus inhaler 250-50 mcg Inhale 1 puff into the lungs 2 (two) times daily. Controller 60 each 11              .    .          "

## 2020-03-16 NOTE — ASSESSMENT & PLAN NOTE
Patient reports drinking 2 beers daily. Previously on last admission reported 3 beers and 2 shots of liquor nightly and has been doing so for over a year  LFTs elevated but stable compared to previous ones: AST 87, ALT 58, ALK phos 121    -- CIWA checks due to being a daily drinker and having a history of seizures.  -- seizure precautions.

## 2020-03-16 NOTE — ASSESSMENT & PLAN NOTE
59 M with h/o HTN, HLD here for COPD exacerbation; cars consulted for new HFrEF of 10% and G1DD. TTE with normal LV size, no dilation. No h/o typical/atypial angina, no palpitations, no orthopnea, no PND, no personal cardiac history.  on admit, trop flat 0.063 (peak 0.072), appears clinically euvolemic with no JVD/HJR/rales/BLE edema. EKG with NSR and anterior TWI. Unclear etiology of worsening heart fxn at this time: ischemic heart disease most likely (history of HLD and continued daily smoking), no dilation seen on echo, no valvular dz seen on echo, hypertensive CM possible with uncontrolled HTN noted per chart check but no hypertrophy noted on echo.      - No stress test or angiography at this time  - Discontinue losartan-HCTZ  - Start bidil 1 tablet (20 mg imdur, 37.5 hydralazine) bid  - Start entresto 49-51 mg bid to start evening of 3/17 (need 36 hr washout of losartan)  - Start lasix 20 mg po daily    Will follow

## 2020-03-16 NOTE — PROGRESS NOTES
"[facility] Hospital Medicine  Progress Note    Patient Name: Baltazar Mccray  MRN: [id number]  Patient Class: IP- Inpatient   Admission Date: 3/15/2020  Length of Stay: 1 days  Attending Physician: Manuel Gutierrez MD  Primary Care Provider: [facility] Medicine Team: [facility] MED 5 Yrn Silverman MD    Subjective:     Principal Problem:Acute on chronic respiratory failure with hypoxia and hypercapnia        HPI:  Mr. Mccray is 59 years old  male patient with past medical history of COPD on home oxygen 2 L nightly, asthma, seizure disorder (last seizure 4 years). Presents to the ED with shortness of breath that started suddenly this morning while asleep on oxygen and "feeling sick" that prompted him to come the ED. Patient endorses nausea without vomiting. Patient states that sometimes he wakes up at night with sob. He sleeps on one pillow. Patient denies cough, upper respiratory symptoms, chest pain, chills, fever, palpitation, lightheadedness, lower extremity swelling, and abdominal pain. Patient denies sick contacts and recent travel. Patient states that he uses his control inhalers regularly and rescue inhaler 4 times a day. He noticed increase use of his rescue inhalers recently. Patient has multiple admissions for COPD exacerbation with most recent one in Feb 2020. Patient drinks couple of beers daily.    In the ED patient was found sating 85% at rest and 77% upon ambulation on room air, afebrile, HR 99, /89. VBG 7.305/66.2/32.9. No leukocytosis, hb 13.6, d-dimer 1.6, AST 87, ALT57, alk phos 121, .  Chest x ray showed hyperinflated lungs without vascular congestion. Respiratory infectious panel and flu are negative. CTA did not show thromoebolism and showed stable pulmonary nodule. Placed on 9L and weaned to 2L with sats of 95%. Patient given duonebs, prednisone, and azithromycin and admitted to hospital medicine for further " management.      Overview/Hospital Course:  No notes on file    Interval History: Patient complaining of 8/10 epigastric nonradiating abdominal pain with nausea. Denies vomiting. Pain got better after bowel movement. Breathing is better, sating 97% on 2L. Still crackles in left lower lung. Patient started on miralax and senna.    Review of Systems   Constitutional: Positive for fatigue. Negative for chills, diaphoresis and fever.   HENT: Negative for congestion, rhinorrhea and sore throat.    Respiratory: Positive for shortness of breath. Negative for cough and chest tightness.    Cardiovascular: Negative for chest pain, palpitations and leg swelling.   Gastrointestinal: Positive for nausea. Negative for abdominal distention, abdominal pain, blood in stool and vomiting.   Endocrine: Negative for polyuria.   Genitourinary: Negative for decreased urine volume, dysuria, flank pain and hematuria.   Skin: Negative for rash.   Allergic/Immunologic: Negative for food allergies and immunocompromised state.   Neurological: Positive for tremors. Negative for dizziness, seizures and light-headedness.   Psychiatric/Behavioral: Negative for confusion.     Objective:     Vital Signs (Most Recent):  Temp: 96.4 °F (35.8 °C) (03/16/20 1147)  Pulse: 69 (03/16/20 1232)  Resp: 17 (03/16/20 1232)  BP: (!) 170/96 (03/16/20 1147)  SpO2: 97 % (03/16/20 1232) Vital Signs (24h Range):  Temp:  [96.4 °F (35.8 °C)-98.5 °F (36.9 °C)] 96.4 °F (35.8 °C)  Pulse:  [] 69  Resp:  [16-20] 17  SpO2:  [94 %-99 %] 97 %  BP: (151-172)/() 170/96     Weight: 56.3 kg (124 lb 1.9 oz)  Body mass index is 18.87 kg/m².    Intake/Output Summary (Last 24 hours) at 3/16/2020 1248  Last data filed at 3/16/2020 1233  Gross per 24 hour   Intake 100 ml   Output 300 ml   Net -200 ml      Physical Exam   Constitutional: He is oriented to person, place, and time.   HENT:   Head: Normocephalic and atraumatic.   Mouth/Throat: Abnormal dentition.   Eyes: Pupils  are equal, round, and reactive to light. EOM are normal.   Cardiovascular: Normal rate, regular rhythm, normal heart sounds and intact distal pulses. Exam reveals no gallop and no friction rub.   No murmur heard.  Pulmonary/Chest: No respiratory distress. He has decreased breath sounds. He has rales in the left lower field.   Musculoskeletal: He exhibits no edema or tenderness.   Neurological: He is alert and oriented to person, place, and time. He displays tremor. GCS eye subscore is 4. GCS verbal subscore is 5. GCS motor subscore is 6.   Nursing note and vitals reviewed.      Significant Labs: All pertinent labs within the past 24 hours have been reviewed.    Significant Imaging: I have reviewed all pertinent imaging results/findings within the past 24 hours.      Assessment/Plan:      * Acute on chronic respiratory failure with hypoxia and hypercapnia  59 years old  male patient with past medical history of COPD on home oxygen 2 L nightly, asthma, seizure disorder (last seizure 4 years). Presents to the ED with shortness of breath that started suddenly this morning while asleep on oxygen. Patient denies cough, upper respiratory symptoms, chest pain, and recent sick contacts. He is afebrile with no leukocytosis  Was started on high flow o2 9L and weaned to 2L. VB.305/66/32. D-dimer 1.60. EKG without major ST changes  CXR: No acute cardio-pulmonary process. CTA no pulmonary thromboembolism  Given 1X dose of duonebs, prednisone, and IV azithromycin in the ED     PLAN:  --  O2 to tolerance patient should be satting between 88-92%.   -- Elio-nebs Q4H while awake.  -- Incentive spirometry   -- Short course of prednisone 40 mg QD for 4 days - patient had bedside delivery of remaining dosages  -- Continue control inhaler, spiriva and breo elipta (patient uses advar at home)  -- Flu and RVP are negative  -- Echo in Aug 2018 showed Normal left ventricular systolic function (EF 60-65%). Indeterminate LV  diastolic function. Will repeat echo given sob at nights and BNP of 500s.      Abdominal pain  Patient with 8/10 abdominal pain with nausea that improved with bowel movement.    - bowel regimen.  - will scan abdomen if pain persists       HFrEF (heart failure with reduced ejection fraction)    Echo 3/16/20:  · Severely decreased left ventricular systolic function. The estimated ejection fraction is 10%.  · Grade I (mild) left ventricular diastolic dysfunction consistent with impaired relaxation.  · Moderate left atrial enlargement.  · Moderately reduced right ventricular systolic function.  · Intermediate central venous pressure (8 mmHg).  · The estimated PA systolic pressure is 22 mmHg.      Previous Echo in aug 2018:     1 - Moderate left atrial enlargement.     2 - Normal left ventricular systolic function (EF 60-65%).     3 - No wall motion abnormalities.     4 - Indeterminate LV diastolic function.     5 - Normal right ventricular systolic function .     6 - The estimated PA systolic pressure is 38 mmHg.     7 - Atrial septal aneurysm .       -- Cardiology consult.  -- CXR without pulmonary edema.  -- will start GDMT per cards reccs.        Transaminitis  Patient reports drinking 2 beers daily. Previously on last admission reported 3 beers and 2 shots of liquor nightly and has been doing so for over a year. Patient complaining of abdominal pain.  LFTs elevated but stable compared to previous ones: AST 87, ALT 58, ALK phos 121    -- CIWA checks due to being a daily drinker and having a history of seizures.  -- seizure precautions.  -- lipase wnl    Tobacco abuse  Pt counseled on effect of smoking on lung disease and risk of cancer     - Nicotine patch      Elevated troponin  At admission 0.072, trended down 0.072      Hx of seizure disorder  Last seizure 3-4 years ago per patient. Previously on Keppra    - seizure precations      Essential hypertension  -- Continue home Losartan-HCTZ.      Lung nodule  Unchanged  0.7 cm nodule in CTA          VTE Risk Mitigation (From admission, onward)         Ordered     enoxaparin injection 40 mg  Daily      03/15/20 1907     IP VTE HIGH RISK PATIENT  Once      03/15/20 1907                      Yrn Silverman MD  Department of Hospital Medicine   [facility]

## 2020-03-16 NOTE — PROGRESS NOTES
Patient arrived to floor on stretcher from ER per transport staff. Patient has no telemetry orders. Patient is AAO. VSS, NAD noted, Patient oriented to room and floor. Patient notified family of admit and room number. Patient in bed, bed in lowest locked position, side rails up x2, call bell in reach.

## 2020-03-16 NOTE — PLAN OF CARE
Bedside echo complete, Cardiology consulted for decreased EF. IV lasix given x1. home 02 at 2l/nc, daily prednisone, mdi and oral abx admin as ordered. No fall related injury. C/o nausea relieved with iv promethazine. C/O abd cramping unrelieved with Tums, DR. Almonte notified no new orders at this time. Will continue to monitor.

## 2020-03-16 NOTE — ASSESSMENT & PLAN NOTE
Echo 3/16/20:  · Severely decreased left ventricular systolic function. The estimated ejection fraction is 10%.  · Grade I (mild) left ventricular diastolic dysfunction consistent with impaired relaxation.  · Moderate left atrial enlargement.  · Moderately reduced right ventricular systolic function.  · Intermediate central venous pressure (8 mmHg).  · The estimated PA systolic pressure is 22 mmHg.      Previous Echo in aug 2018:     1 - Moderate left atrial enlargement.     2 - Normal left ventricular systolic function (EF 60-65%).     3 - No wall motion abnormalities.     4 - Indeterminate LV diastolic function.     5 - Normal right ventricular systolic function .     6 - The estimated PA systolic pressure is 38 mmHg.     7 - Atrial septal aneurysm .       -- Cardiology consult.  -- CXR without pulmonary edema.  -- will start GDMT per cards reccs.

## 2020-03-16 NOTE — ASSESSMENT & PLAN NOTE
Patient reports drinking 2 beers daily. Previously on last admission reported 3 beers and 2 shots of liquor nightly and has been doing so for over a year. Patient complaining of abdominal pain.  LFTs elevated but stable compared to previous ones: AST 87, ALT 58, ALK phos 121    -- CIWA checks due to being a daily drinker and having a history of seizures.  -- seizure precautions.  -- lipase wnl

## 2020-03-16 NOTE — SUBJECTIVE & OBJECTIVE
Interval History: Patient complaining of 8/10 epigastric nonradiating abdominal pain with nausea. Denies vomiting. Pain got better after bowel movement. Breathing is better, sating 97% on 2L. Still crackles in left lower lung. Patient started on miralax and senna.    Review of Systems   Constitutional: Positive for fatigue. Negative for chills, diaphoresis and fever.   HENT: Negative for congestion, rhinorrhea and sore throat.    Respiratory: Positive for shortness of breath. Negative for cough and chest tightness.    Cardiovascular: Negative for chest pain, palpitations and leg swelling.   Gastrointestinal: Positive for nausea. Negative for abdominal distention, abdominal pain, blood in stool and vomiting.   Endocrine: Negative for polyuria.   Genitourinary: Negative for decreased urine volume, dysuria, flank pain and hematuria.   Skin: Negative for rash.   Allergic/Immunologic: Negative for food allergies and immunocompromised state.   Neurological: Positive for tremors. Negative for dizziness, seizures and light-headedness.   Psychiatric/Behavioral: Negative for confusion.     Objective:     Vital Signs (Most Recent):  Temp: 96.4 °F (35.8 °C) (03/16/20 1147)  Pulse: 69 (03/16/20 1232)  Resp: 17 (03/16/20 1232)  BP: (!) 170/96 (03/16/20 1147)  SpO2: 97 % (03/16/20 1232) Vital Signs (24h Range):  Temp:  [96.4 °F (35.8 °C)-98.5 °F (36.9 °C)] 96.4 °F (35.8 °C)  Pulse:  [] 69  Resp:  [16-20] 17  SpO2:  [94 %-99 %] 97 %  BP: (151-172)/() 170/96     Weight: 56.3 kg (124 lb 1.9 oz)  Body mass index is 18.87 kg/m².    Intake/Output Summary (Last 24 hours) at 3/16/2020 1248  Last data filed at 3/16/2020 1233  Gross per 24 hour   Intake 100 ml   Output 300 ml   Net -200 ml      Physical Exam   Constitutional: He is oriented to person, place, and time.   HENT:   Head: Normocephalic and atraumatic.   Mouth/Throat: Abnormal dentition.   Eyes: Pupils are equal, round, and reactive to light. EOM are normal.    Cardiovascular: Normal rate, regular rhythm, normal heart sounds and intact distal pulses. Exam reveals no gallop and no friction rub.   No murmur heard.  Pulmonary/Chest: No respiratory distress. He has decreased breath sounds. He has rales in the left lower field.   Musculoskeletal: He exhibits no edema or tenderness.   Neurological: He is alert and oriented to person, place, and time. He displays tremor. GCS eye subscore is 4. GCS verbal subscore is 5. GCS motor subscore is 6.   Nursing note and vitals reviewed.      Significant Labs: All pertinent labs within the past 24 hours have been reviewed.    Significant Imaging: I have reviewed all pertinent imaging results/findings within the past 24 hours.

## 2020-03-16 NOTE — NURSING
C/O ABD CRAMPING. REQUESTING TUMS, DR. LARIOS PAGED TO AUTHORIZE INCREASED FREQUENCY OF TUMS. MD STATED THAT IT WOULD NOT HELP CRAMPING. EVEN THOUGH HE HAD BM TODAY AND YESTERDAY HE WAS GIVEN MIRALAX AD ORDERED. MD STATED THAT IF HE CONTINUES TO HAVE ABD PAIN HE WOULD SCAN ABD. WILL CONTINUE TO MONITOR.

## 2020-03-16 NOTE — SUBJECTIVE & OBJECTIVE
Past Medical History:   Diagnosis Date    Asthma     COPD (chronic obstructive pulmonary disease)     home O2 at night only    Hypertension     Pneumonia     Seizures        History reviewed. No pertinent surgical history.    Review of patient's allergies indicates:   Allergen Reactions    Benazepril Swelling    Duoneb [ipratropium-albuterol]      Report Tremors       No current facility-administered medications on file prior to encounter.      Current Outpatient Medications on File Prior to Encounter   Medication Sig    albuterol (PROVENTIL HFA) 90 mcg/actuation inhaler Inhale 2 puffs into the lungs every 6 (six) hours as needed for Wheezing. Rescue    fluticasone-salmeterol diskus inhaler 250-50 mcg Inhale 1 puff into the lungs 2 (two) times daily. Controller    ipratropium-albuterol (COMBIVENT)  mcg/actuation inhaler Inhale 1 puff into the lungs every 6 (six) hours as needed for Wheezing. Rescue    losartan-hydrochlorothiazide 100-12.5 mg (HYZAAR) 100-12.5 mg Tab Take 1 tablet by mouth once daily.    omeprazole (PRILOSEC) 20 MG capsule Take 1 capsule (20 mg total) by mouth once daily.    tiotropium (SPIRIVA) 18 mcg inhalation capsule Inhale 1 capsule (18 mcg total) into the lungs once daily. Controller     Family History     Problem Relation (Age of Onset)    Cancer Father    Hypertension Sister    Stroke Sister, Brother        Tobacco Use    Smoking status: Current Every Day Smoker     Packs/day: 0.25     Types: Cigarettes    Smokeless tobacco: Never Used    Tobacco comment: 1-2 cigs per day   Substance and Sexual Activity    Alcohol use: No    Drug use: No    Sexual activity: Not on file     Review of Systems   Constitutional: Positive for fatigue. Negative for chills, diaphoresis and fever.   HENT: Negative for congestion, rhinorrhea and sore throat.    Respiratory: Positive for shortness of breath. Negative for cough and chest tightness.    Cardiovascular: Negative for chest pain,  palpitations and leg swelling.   Gastrointestinal: Positive for nausea. Negative for abdominal distention, abdominal pain, blood in stool and vomiting.   Endocrine: Negative for polyuria.   Genitourinary: Negative for decreased urine volume, dysuria, flank pain and hematuria.   Skin: Negative for rash.   Allergic/Immunologic: Negative for food allergies and immunocompromised state.   Neurological: Positive for tremors. Negative for dizziness, seizures and light-headedness.   Psychiatric/Behavioral: Negative for confusion.     Objective:     Vital Signs (Most Recent):  Temp: 97.9 °F (36.6 °C) (03/15/20 0859)  Pulse: 99 (03/15/20 1606)  Resp: 19 (03/15/20 1435)  BP: (!) 155/72 (03/15/20 1606)  SpO2: 95 % (03/15/20 1606) Vital Signs (24h Range):  Temp:  [97.9 °F (36.6 °C)-98.5 °F (36.9 °C)] 97.9 °F (36.6 °C)  Pulse:  [] 99  Resp:  [14-33] 19  SpO2:  [92 %-100 %] 95 %  BP: (124-159)/(72-98) 155/72        There is no height or weight on file to calculate BMI.    Physical Exam   Constitutional: He is oriented to person, place, and time.   HENT:   Head: Normocephalic and atraumatic.   Mouth/Throat: Abnormal dentition.   Eyes: Pupils are equal, round, and reactive to light. EOM are normal.   Cardiovascular: Normal rate, regular rhythm, normal heart sounds and intact distal pulses. Exam reveals no gallop and no friction rub.   No murmur heard.  Pulmonary/Chest: No respiratory distress. He has decreased breath sounds. He has rales in the left lower field.   Musculoskeletal: He exhibits no edema or tenderness.   Neurological: He is alert and oriented to person, place, and time. He displays tremor. GCS eye subscore is 4. GCS verbal subscore is 5. GCS motor subscore is 6.   Nursing note and vitals reviewed.        CRANIAL NERVES     CN III, IV, VI   Pupils are equal, round, and reactive to light.  Extraocular motions are normal.        Significant Labs: All pertinent labs within the past 24 hours have been  reviewed.    Significant Imaging: I have reviewed all pertinent imaging results/findings within the past 24 hours.

## 2020-03-16 NOTE — PLAN OF CARE
Patient remains free of falls or injury. Patient complains of stomachache; treated with PO tums. Admitted for COPD exacerbation. CT of chest (-) for PE. Breathing treatments q4 hours and PRN for wheezing. Viral panel and flu swab (-). Plan of care reviewed with patient.

## 2020-03-16 NOTE — CONSULTS
[facility]  Cardiology  Consult Note    Patient Name: Baltazar Mccray  MRN: [id number]  Admission Date: 3/15/2020  Hospital Length of Stay: 1 days  Code Status: Full Code   Attending Provider: Manuel Gutierrez MD   Consulting Provider: Jeff Rhodes MD  Primary Care Physician: [facility]  Principal Problem:Acute on chronic respiratory failure with hypoxia and hypercapnia    Patient information was obtained from patient, past medical records and ER records.     Inpatient consult to Cardiology  Consult performed by: Jeff Rhodes MD  Consult ordered by: Yrn Sivlerman MD        Subjective:     Chief Complaint:  HFrEF     HPI:   Mr. Mccray is 58 yo M with PMH of HTN, HLD, COPD on 2 L home O2 qhs per NC, asthma, and seizure disorder (last seizure 4 ya) here with COPD exacerbation. The patient is still feeling short of breath, though improving compared to admission. Cardiology consulted for new EF 10%, G1DD (normal EF 8/2018). Patient denies orthopnea, PND, angina (typical or atypical), palpitations, or chest pain in general. He reports having to take a 5 minute break on the second floor while climbing the 3 flights of stairs it takes to his apartment for the last 8 months since he's lived there but no chest pain. Denies previous MI or CVA. Father had MI at age 62, brother and sister have had CVA. Smokes 2-3 cigarettes/day, drinks 2 beers/day. On admit, , CXR without pulm edema.        Past Medical History:   Diagnosis Date    Asthma     COPD (chronic obstructive pulmonary disease)     home O2 at night only    Hypertension     Pneumonia     Seizures        History reviewed. No pertinent surgical history.    Review of patient's allergies indicates:   Allergen Reactions    Benazepril Swelling    Duoneb [ipratropium-albuterol]      Report Tremors       No current facility-administered medications on file prior to encounter.      Current Outpatient Medications on File  Prior to Encounter   Medication Sig    fluticasone propion-salmeterol 45-21 mcg/dose (ADVAIR HFA) 45-21 mcg/actuation HFAA inhaler Inhale 2 puffs into the lungs every 12 (twelve) hours. Controller    ipratropium-albuterol (COMBIVENT)  mcg/actuation inhaler Inhale 1 puff into the lungs every 6 (six) hours as needed for Wheezing. Rescue    losartan-hydrochlorothiazide 100-12.5 mg (HYZAAR) 100-12.5 mg Tab Take 1 tablet by mouth once daily. (Patient taking differently: Take 1 tablet by mouth every other day. )    tiotropium (SPIRIVA) 18 mcg inhalation capsule Inhale 1 capsule (18 mcg total) into the lungs once daily. Controller    [DISCONTINUED] omeprazole (PRILOSEC) 20 MG capsule Take 1 capsule (20 mg total) by mouth once daily.    albuterol (PROVENTIL HFA) 90 mcg/actuation inhaler Inhale 2 puffs into the lungs every 6 (six) hours as needed for Wheezing. Rescue    [DISCONTINUED] fluticasone-salmeterol diskus inhaler 250-50 mcg Inhale 1 puff into the lungs 2 (two) times daily. Controller     Family History     Problem Relation (Age of Onset)    Cancer Father    Hypertension Sister    Stroke Sister, Brother        Tobacco Use    Smoking status: Current Every Day Smoker     Packs/day: 0.25     Types: Cigarettes    Smokeless tobacco: Never Used    Tobacco comment: 1-2 cigs per day   Substance and Sexual Activity    Alcohol use: Yes     Alcohol/week: 2.0 standard drinks     Types: 2 Cans of beer per week     Comment: every night    Drug use: No    Sexual activity: Not on file     Review of Systems   Constitution: Negative for chills and fever.   Cardiovascular: Positive for dyspnea on exertion. Negative for chest pain, claudication, irregular heartbeat, leg swelling, orthopnea, palpitations and paroxysmal nocturnal dyspnea.   Gastrointestinal: Positive for abdominal pain. Negative for nausea and vomiting.     Objective:     Vital Signs (Most Recent):  Temp: 97.9 °F (36.6 °C) (03/16/20 1623)  Pulse: 92  (03/16/20 1639)  Resp: 18 (03/16/20 1639)  BP: (!) 154/98 (03/16/20 1623)  SpO2: 97 % (03/16/20 1623) Vital Signs (24h Range):  Temp:  [96.4 °F (35.8 °C)-98.5 °F (36.9 °C)] 97.9 °F (36.6 °C)  Pulse:  [66-92] 92  Resp:  [16-19] 18  SpO2:  [94 %-99 %] 97 %  BP: (152-172)/() 154/98     Weight: 56.2 kg (124 lb)  Body mass index is 18.85 kg/m².    SpO2: 97 %  O2 Device (Oxygen Therapy): nasal cannula      Intake/Output Summary (Last 24 hours) at 3/16/2020 1640  Last data filed at 3/16/2020 1233  Gross per 24 hour   Intake 340 ml   Output 300 ml   Net 40 ml       Lines/Drains/Airways     Peripheral Intravenous Line                 Peripheral IV - Single Lumen 03/15/20 0720 20 G Left Forearm 1 day                Physical Exam   Constitutional: He is oriented to person, place, and time. No distress.   Resting comfortably in bed with NC in placea   HENT:   Head: Normocephalic and atraumatic.   Mouth/Throat: Oropharynx is clear and moist. No oropharyngeal exudate.   Poor dentition, MM moist   Eyes: Pupils are equal, round, and reactive to light. EOM are normal.   Neck: Normal range of motion. Neck supple. No JVD present.   No JVD or HJR   Cardiovascular: Normal rate, regular rhythm and intact distal pulses.   No murmur heard.  Pulmonary/Chest: Effort normal and breath sounds normal. No respiratory distress. He has no wheezes. He has no rales.   Abdominal: Soft. Bowel sounds are normal. There is tenderness (LLQ). There is no rebound and no guarding.   Musculoskeletal: Normal range of motion. He exhibits no edema.   Neurological: He is alert and oriented to person, place, and time.   Skin: Skin is warm and dry. He is not diaphoretic.       Significant Labs: All pertinent lab results from the last 24 hours have been reviewed.    Significant Imaging: All pertinent imaging from last 24 have been reviewed    Assessment and Plan:     HFrEF (heart failure with reduced ejection fraction)  59 M with h/o HTN, HLD here for COPD  exacerbation; cars consulted for new HFrEF of 10% and G1DD. TTE with normal LV size, no dilation. No h/o typical/atypial angina, no palpitations, no orthopnea, no PND, no personal cardiac history.  on admit, trop flat 0.063 (peak 0.072), appears clinically euvolemic with no JVD/HJR/rales/BLE edema. EKG with NSR and anterior TWI. Unclear etiology of worsening heart fxn at this time: ischemic heart disease most likely (history of HLD and continued daily smoking), no dilation seen on echo, no valvular dz seen on echo, hypertensive CM possible with uncontrolled HTN noted per chart check but no hypertrophy noted on echo.      - No stress test or angiography at this time  - Discontinue losartan-HCTZ  - Start bidil 1 tablet (20 mg imdur, 37.5 hydralazine) bid  - Start entresto 49-51 mg bid to start evening of 3/17 (need 36 hr washout of losartan)  - Start lasix 20 mg po daily    Will follow        VTE Risk Mitigation (From admission, onward)         Ordered     enoxaparin injection 40 mg  Daily      03/15/20 1907     IP VTE HIGH RISK PATIENT  Once      03/15/20 1907                Thank you for your consult. I will follow-up with patient. Please contact us if you have any additional questions.    Jeff Rhodes MD  Cardiology   [facility]

## 2020-03-16 NOTE — ASSESSMENT & PLAN NOTE
Patient with 8/10 abdominal pain with nausea that improved with bowel movement.    - bowel regimen.  - will scan abdomen if pain persists

## 2020-03-16 NOTE — HPI
"Mr. Mccray is 59 years old  male patient with past medical history of COPD on home oxygen 2 L nightly, asthma, seizure disorder (last seizure 4 years). Presents to the ED with shortness of breath that started suddenly this morning while asleep on oxygen and "feeling sick" that prompted him to come the ED. Patient endorses nausea without vomiting. Patient states that sometimes he wakes up at night with sob. He sleeps on one pillow. Patient denies cough, upper respiratory symptoms, chest pain, chills, fever, palpitation, lightheadedness, lower extremity swelling, and abdominal pain. Patient denies sick contacts and recent travel. Patient states that he uses his control inhalers regularly and rescue inhaler 4 times a day. He noticed increase use of his rescue inhalers recently. Patient has multiple admissions for COPD exacerbation with most recent one in Feb 2020. Patient drinks couple of beers daily.    In the ED patient was found sating 85% at rest and 77% upon ambulation on room air, afebrile, HR 99, /89. VBG 7.305/66.2/32.9. No leukocytosis, hb 13.6, d-dimer 1.6, AST 87, ALT57, alk phos 121, .  Chest x ray showed hyperinflated lungs without vascular congestion. Respiratory infectious panel and flu are negative. CTA did not show thromoebolism and showed stable pulmonary nodule. Placed on 9L and weaned to 2L with sats of 95%. Patient given duonebs, prednisone, and azithromycin and admitted to hospital medicine for further management.    "

## 2020-03-16 NOTE — HPI
Mr. Mccray is 60 yo M with PMH of HTN, HLD, COPD on 2 L home O2 qhs per [place], asthma, and seizure disorder (last seizure 4 ya) here with COPD exacerbation. The patient is still feeling short of breath, though improving compared to admission. Cardiology consulted for new EF 10%, G1DD (normal EF 8/2018). Patient denies orthopnea, PND, angina (typical or atypical), palpitations, or chest pain in general. He reports having to take a 5 minute break on the second floor while climbing the 3 flights of stairs it takes to his apartment for the last 8 months since he's lived there but no chest pain. Denies previous MI or CVA. Father had MI at age 62, brother and sister have had CVA. Smokes 2-3 cigarettes/day, drinks 2 beers/day. On admit, , CXR without pulm edema.

## 2020-03-16 NOTE — ASSESSMENT & PLAN NOTE
59 years old  male patient with past medical history of COPD on home oxygen 2 L nightly, asthma, seizure disorder (last seizure 4 years). Presents to the ED with shortness of breath that started suddenly this morning while asleep on oxygen. Patient denies cough, upper respiratory symptoms, chest pain, and recent sick contacts. He is afebrile with no leukocytosis  Was started on high flow o2 9L and weaned to 2L. VB.305/66/32. D-dimer 1.60. EKG without major ST changes  CXR: No acute cardio-pulmonary process. CTA no pulmonary thromboembolism  Given 1X dose of duonebs, prednisone, and IV azithromycin in the ED     PLAN:  --  O2 to tolerance patient should be satting between 88-92%.   -- Elio-nebs Q4H while awake.  -- Incentive spirometry   -- Short course of prednisone 40 mg QD for 4 days - patient had bedside delivery of remaining dosages  -- Continue control inhaler, spiriva and breo elipta (patient uses advar at home)  -- Flu and RVP are negative  -- Echo in Aug 2018 showed Normal left ventricular systolic function (EF 60-65%). Indeterminate LV diastolic function. Will repeat echo given sob at nights and BNP of 500s.

## 2020-03-17 VITALS
SYSTOLIC BLOOD PRESSURE: 94 MMHG | WEIGHT: 124 LBS | BODY MASS INDEX: 18.79 KG/M2 | OXYGEN SATURATION: 95 % | HEIGHT: 68 IN | HEART RATE: 70 BPM | RESPIRATION RATE: 16 BRPM | DIASTOLIC BLOOD PRESSURE: 60 MMHG | TEMPERATURE: 97 F

## 2020-03-17 LAB
ALBUMIN SERPL BCP-MCNC: 3.3 G/DL (ref 3.5–5.2)
ALLENS TEST: ABNORMAL
ALP SERPL-CCNC: 113 U/L (ref 55–135)
ALT SERPL W/O P-5'-P-CCNC: 52 U/L (ref 10–44)
ANION GAP SERPL CALC-SCNC: 17 MMOL/L (ref 8–16)
AST SERPL-CCNC: 53 U/L (ref 10–40)
BASOPHILS # BLD AUTO: 0.02 K/UL (ref 0–0.2)
BASOPHILS NFR BLD: 0.2 % (ref 0–1.9)
BILIRUB SERPL-MCNC: 0.6 MG/DL (ref 0.1–1)
BUN SERPL-MCNC: 19 MG/DL (ref 6–20)
CALCIUM SERPL-MCNC: 9.4 MG/DL (ref 8.7–10.5)
CHLORIDE SERPL-SCNC: 95 MMOL/L (ref 95–110)
CO2 SERPL-SCNC: 25 MMOL/L (ref 23–29)
CREAT SERPL-MCNC: 1 MG/DL (ref 0.5–1.4)
DELSYS: ABNORMAL
DIFFERENTIAL METHOD: ABNORMAL
EOSINOPHIL # BLD AUTO: 0 K/UL (ref 0–0.5)
EOSINOPHIL NFR BLD: 0.1 % (ref 0–8)
ERYTHROCYTE [DISTWIDTH] IN BLOOD BY AUTOMATED COUNT: 19.9 % (ref 11.5–14.5)
EST. GFR  (AFRICAN AMERICAN): >60 ML/MIN/1.73 M^2
EST. GFR  (NON AFRICAN AMERICAN): >60 ML/MIN/1.73 M^2
FIO2: 28
GLUCOSE SERPL-MCNC: 70 MG/DL (ref 70–110)
HCO3 UR-SCNC: 28.3 MMOL/L (ref 24–28)
HCT VFR BLD AUTO: 51.1 % (ref 40–54)
HGB BLD-MCNC: 16.3 G/DL (ref 14–18)
IMM GRANULOCYTES # BLD AUTO: 0.04 K/UL (ref 0–0.04)
IMM GRANULOCYTES NFR BLD AUTO: 0.4 % (ref 0–0.5)
LACTATE SERPL-SCNC: 2.2 MMOL/L (ref 0.5–2.2)
LYMPHOCYTES # BLD AUTO: 1.5 K/UL (ref 1–4.8)
LYMPHOCYTES NFR BLD: 15.9 % (ref 18–48)
MAGNESIUM SERPL-MCNC: 2.1 MG/DL (ref 1.6–2.6)
MCH RBC QN AUTO: 26.6 PG (ref 27–31)
MCHC RBC AUTO-ENTMCNC: 31.9 G/DL (ref 32–36)
MCV RBC AUTO: 83 FL (ref 82–98)
MODE: ABNORMAL
MONOCYTES # BLD AUTO: 1.1 K/UL (ref 0.3–1)
MONOCYTES NFR BLD: 11.8 % (ref 4–15)
NEUTROPHILS # BLD AUTO: 6.6 K/UL (ref 1.8–7.7)
NEUTROPHILS NFR BLD: 71.6 % (ref 38–73)
NRBC BLD-RTO: 0 /100 WBC
PCO2 BLDA: 35.3 MMHG (ref 35–45)
PH SMN: 7.51 [PH] (ref 7.35–7.45)
PHOSPHATE SERPL-MCNC: 3 MG/DL (ref 2.7–4.5)
PLATELET # BLD AUTO: 292 K/UL (ref 150–350)
PMV BLD AUTO: 10.8 FL (ref 9.2–12.9)
PO2 BLDA: 88 MMHG (ref 80–100)
POC BE: 5 MMOL/L
POC SATURATED O2: 98 % (ref 95–100)
POC TCO2: 29 MMOL/L (ref 23–27)
POTASSIUM SERPL-SCNC: 4.5 MMOL/L (ref 3.5–5.1)
PROT SERPL-MCNC: 7.6 G/DL (ref 6–8.4)
RBC # BLD AUTO: 6.13 M/UL (ref 4.6–6.2)
SAMPLE: ABNORMAL
SITE: ABNORMAL
SODIUM SERPL-SCNC: 137 MMOL/L (ref 136–145)
TROPONIN I SERPL DL<=0.01 NG/ML-MCNC: 0.1 NG/ML (ref 0–0.03)
WBC # BLD AUTO: 9.18 K/UL (ref 3.9–12.7)

## 2020-03-17 PROCEDURE — 80053 COMPREHEN METABOLIC PANEL: CPT

## 2020-03-17 PROCEDURE — S4991 NICOTINE PATCH NONLEGEND: HCPCS | Performed by: STUDENT IN AN ORGANIZED HEALTH CARE EDUCATION/TRAINING PROGRAM

## 2020-03-17 PROCEDURE — 99900035 HC TECH TIME PER 15 MIN (STAT)

## 2020-03-17 PROCEDURE — 36600 WITHDRAWAL OF ARTERIAL BLOOD: CPT

## 2020-03-17 PROCEDURE — 25000003 PHARM REV CODE 250: Performed by: STUDENT IN AN ORGANIZED HEALTH CARE EDUCATION/TRAINING PROGRAM

## 2020-03-17 PROCEDURE — 94761 N-INVAS EAR/PLS OXIMETRY MLT: CPT

## 2020-03-17 PROCEDURE — 63600175 PHARM REV CODE 636 W HCPCS: Performed by: STUDENT IN AN ORGANIZED HEALTH CARE EDUCATION/TRAINING PROGRAM

## 2020-03-17 PROCEDURE — 85025 COMPLETE CBC W/AUTO DIFF WBC: CPT

## 2020-03-17 PROCEDURE — 84100 ASSAY OF PHOSPHORUS: CPT

## 2020-03-17 PROCEDURE — 99238 PR HOSPITAL DISCHARGE DAY,<30 MIN: ICD-10-PCS | Mod: ,,,

## 2020-03-17 PROCEDURE — 83605 ASSAY OF LACTIC ACID: CPT

## 2020-03-17 PROCEDURE — 84484 ASSAY OF TROPONIN QUANT: CPT

## 2020-03-17 PROCEDURE — 83735 ASSAY OF MAGNESIUM: CPT

## 2020-03-17 PROCEDURE — 36415 COLL VENOUS BLD VENIPUNCTURE: CPT

## 2020-03-17 PROCEDURE — 82803 BLOOD GASES ANY COMBINATION: CPT

## 2020-03-17 PROCEDURE — 99238 HOSP IP/OBS DSCHRG MGMT 30/<: CPT | Mod: ,,,

## 2020-03-17 PROCEDURE — 27000221 HC OXYGEN, UP TO 24 HOURS

## 2020-03-17 PROCEDURE — 25000242 PHARM REV CODE 250 ALT 637 W/ HCPCS: Performed by: STUDENT IN AN ORGANIZED HEALTH CARE EDUCATION/TRAINING PROGRAM

## 2020-03-17 RX ORDER — SODIUM,POTASSIUM PHOSPHATES 280-250MG
2 POWDER IN PACKET (EA) ORAL ONCE
Status: COMPLETED | OUTPATIENT
Start: 2020-03-17 | End: 2020-03-17

## 2020-03-17 RX ORDER — THIAMINE HCL 100 MG
100 TABLET ORAL DAILY
Status: DISCONTINUED | OUTPATIENT
Start: 2020-03-18 | End: 2020-03-17 | Stop reason: HOSPADM

## 2020-03-17 RX ORDER — PREDNISONE 20 MG/1
40 TABLET ORAL DAILY
Qty: 4 TABLET | Refills: 0 | Status: SHIPPED | OUTPATIENT
Start: 2020-03-18 | End: 2020-03-20

## 2020-03-17 RX ORDER — ISOSORBIDE DINITRATE AND HYDRALAZINE HYDROCHLORIDE 37.5; 2 MG/1; MG/1
1 TABLET ORAL 2 TIMES DAILY
Qty: 60 TABLET | Refills: 11 | Status: CANCELLED | OUTPATIENT
Start: 2020-03-17 | End: 2021-03-17

## 2020-03-17 RX ORDER — PROMETHAZINE HYDROCHLORIDE 12.5 MG/1
12.5 TABLET ORAL EVERY 6 HOURS PRN
Status: DISCONTINUED | OUTPATIENT
Start: 2020-03-17 | End: 2020-03-17 | Stop reason: HOSPADM

## 2020-03-17 RX ORDER — OMEPRAZOLE 20 MG/1
20 CAPSULE, DELAYED RELEASE ORAL DAILY
Qty: 30 CAPSULE | Refills: 1 | Status: ON HOLD | OUTPATIENT
Start: 2020-03-17 | End: 2020-04-23 | Stop reason: SDUPTHER

## 2020-03-17 RX ORDER — ISOSORBIDE DINITRATE AND HYDRALAZINE HYDROCHLORIDE 37.5; 2 MG/1; MG/1
1 TABLET ORAL 2 TIMES DAILY
Status: DISCONTINUED | OUTPATIENT
Start: 2020-03-17 | End: 2020-03-17 | Stop reason: HOSPADM

## 2020-03-17 RX ORDER — ISOSORBIDE DINITRATE AND HYDRALAZINE HYDROCHLORIDE 37.5; 2 MG/1; MG/1
1 TABLET ORAL 2 TIMES DAILY
Qty: 60 TABLET | Refills: 11 | Status: ON HOLD | OUTPATIENT
Start: 2020-03-17 | End: 2020-04-23 | Stop reason: SDUPTHER

## 2020-03-17 RX ORDER — SENNOSIDES 8.6 MG/1
1 TABLET ORAL DAILY PRN
COMMUNITY
Start: 2020-03-17 | End: 2022-02-09

## 2020-03-17 RX ORDER — ISOSORBIDE DINITRATE AND HYDRALAZINE HYDROCHLORIDE 37.5; 2 MG/1; MG/1
1 TABLET ORAL 3 TIMES DAILY
Status: DISCONTINUED | OUTPATIENT
Start: 2020-03-17 | End: 2020-03-17

## 2020-03-17 RX ADMIN — SACUBITRIL AND VALSARTAN 1 TABLET: 49; 51 TABLET, FILM COATED ORAL at 10:03

## 2020-03-17 RX ADMIN — POTASSIUM PHOSPHATE, MONOBASIC AND POTASSIUM PHOSPHATE, DIBASIC 15 MMOL: 224; 236 INJECTION, SOLUTION, CONCENTRATE INTRAVENOUS at 03:03

## 2020-03-17 RX ADMIN — PREDNISONE 40 MG: 20 TABLET ORAL at 09:03

## 2020-03-17 RX ADMIN — PANTOPRAZOLE SODIUM 40 MG: 40 TABLET, DELAYED RELEASE ORAL at 09:03

## 2020-03-17 RX ADMIN — PROMETHAZINE HYDROCHLORIDE 12.5 MG: 12.5 TABLET ORAL at 01:03

## 2020-03-17 RX ADMIN — FLUTICASONE FUROATE AND VILANTEROL TRIFENATATE 1 PUFF: 100; 25 POWDER RESPIRATORY (INHALATION) at 01:03

## 2020-03-17 RX ADMIN — POTASSIUM & SODIUM PHOSPHATES POWDER PACK 280-160-250 MG 2 PACKET: 280-160-250 PACK at 05:03

## 2020-03-17 RX ADMIN — NICOTINE 1 PATCH: 14 PATCH TRANSDERMAL at 10:03

## 2020-03-17 RX ADMIN — HYDRALAZINE HYDROCHLORIDE AND ISOSORBIDE DINITRATE 1 TABLET: 37.5; 2 TABLET, FILM COATED ORAL at 09:03

## 2020-03-17 RX ADMIN — TIOTROPIUM BROMIDE 18 MCG: 18 CAPSULE ORAL; RESPIRATORY (INHALATION) at 04:03

## 2020-03-17 RX ADMIN — POLYETHYLENE GLYCOL 3350 17 G: 17 POWDER, FOR SOLUTION ORAL at 09:03

## 2020-03-17 RX ADMIN — THIAMINE HYDROCHLORIDE 100 MG: 100 INJECTION, SOLUTION INTRAMUSCULAR; INTRAVENOUS at 01:03

## 2020-03-17 NOTE — NURSING
Pt AAOx4. VS measured. Skin assessed. No c/o pain. Neurovascular intact. Free from falls. Non skid socks maintained. Frequent rounds made for pain and safety. Bed at lowest point, call light within reach and side rails up x2.

## 2020-03-17 NOTE — CARE UPDATE
Rapid Response Nurse Chart Check     Chart check completed, abnormal VS noted. Please call [phone] for further concerns or assistance.

## 2020-03-17 NOTE — CARE UPDATE
Called [facility] ([place], [place] [place] ([phone]) who will schedule the patient for outpatient cardiology f/u within the next week.     Jeff Rhodes MD  Cardiology  [facility]

## 2020-03-17 NOTE — NURSING
"Pt states "my IV burning again". Nurse recently slowed down K+Phos infusion to 30mL/hr due to IV irritation. Infusion stopped and MD paged. MD will change med to oral dose.  "

## 2020-03-17 NOTE — HOSPITAL COURSE
Patient was admitted for shortness of breath and abdominal pain. VBG 7.305/66.2/32.9. No leukocytosis, hb 13.6, d-dimer 1.6, AST 87, ALT57, alk phos 121, .  Chest x ray showed hyperinflated lungs without vascular congestion. Respiratory infectious panel and flu are negative. CTA did not show thromoebolism and showed stable pulmonary nodule. Patient was started on prednisone, duonebs, and incentive spirometry. Sob improved. Abdominal pain resolved with bowel movement. Echo showed EF 10% which a major decline to his previous echo aug 2018 with EF 60%. Cardiology consulted, recommended starting bidil 1 tablet (20 mg imdur, 37.5 hydralazine) TID, and entresto 49-51 mg bid, and did not recommend a life vest. Patient started in bidil BID instead of TID given low-normal BP. [facility] contacted and will schedule a cardiology appointment within the next week. At one point, his lactate was high and returned to normal without intervention, abdominal US did not show any stenosis in abdominal arteries. Patient was discharged in good condition and instructed to follow with his PCP.

## 2020-03-17 NOTE — ASSESSMENT & PLAN NOTE
59 M with h/o HTN, HLD here for COPD exacerbation; cars consulted for new HFrEF of 10% and G1DD. TTE with normal LV size, no dilation. No h/o typical/atypial angina, no palpitations, no orthopnea, no PND, no personal cardiac history.  on admit, trop flat 0.063 (peak 0.072), appears clinically euvolemic with no JVD/HJR/rales/BLE edema. EKG with NSR and anterior TWI. Unclear etiology of worsening heart fxn at this time: ischemic heart disease most likely (history of HLD and continued daily smoking), no dilation seen on echo, no valvular dz seen on echo, hypertensive CM possible with uncontrolled HTN noted per chart check but no hypertrophy noted on echo.      - No stress test or angiography at this time  - Discontinue losartan-HCTZ  - Start bidil 1 tablet (20 mg imdur, 37.5 hydralazine) tid  - Start entresto 49-51 mg bid to start evening of 3/17 (need 36 hr washout of losartan)  - Discontinue lasix  - Avoiding BB for now as patient is here with COPD exacerbation, may start cardioselective in future  - Will need outpatient cardiology f/u

## 2020-03-17 NOTE — SUBJECTIVE & OBJECTIVE
Interval History: NAEON. Afebrile, VSS. No CP, palpitations, LE swelling. SOB much improved.    Past Medical History:   Diagnosis Date    Asthma     COPD (chronic obstructive pulmonary disease)     home O2 at night only    Hypertension     Pneumonia     Seizures      History reviewed. No pertinent surgical history.    Review of patient's allergies indicates:   Allergen Reactions    Benazepril Swelling    Duoneb [ipratropium-albuterol]      Report Tremors       No current facility-administered medications on file prior to encounter.      Current Outpatient Medications on File Prior to Encounter   Medication Sig    fluticasone propion-salmeterol 45-21 mcg/dose (ADVAIR HFA) 45-21 mcg/actuation HFAA inhaler Inhale 2 puffs into the lungs every 12 (twelve) hours. Controller    ipratropium-albuterol (COMBIVENT)  mcg/actuation inhaler Inhale 1 puff into the lungs every 6 (six) hours as needed for Wheezing. Rescue    tiotropium (SPIRIVA) 18 mcg inhalation capsule Inhale 1 capsule (18 mcg total) into the lungs once daily. Controller    [DISCONTINUED] losartan-hydrochlorothiazide 100-12.5 mg (HYZAAR) 100-12.5 mg Tab Take 1 tablet by mouth once daily. (Patient taking differently: Take 1 tablet by mouth every other day. )    albuterol (PROVENTIL HFA) 90 mcg/actuation inhaler Inhale 2 puffs into the lungs every 6 (six) hours as needed for Wheezing. Rescue     Family History     Problem Relation (Age of Onset)    Cancer Father    Hypertension Sister    Stroke Sister, Brother        Tobacco Use    Smoking status: Current Every Day Smoker     Packs/day: 0.25     Types: Cigarettes    Smokeless tobacco: Never Used    Tobacco comment: 1-2 cigs per day   Substance and Sexual Activity    Alcohol use: Yes     Alcohol/week: 2.0 standard drinks     Types: 2 Cans of beer per week     Comment: every night    Drug use: No    Sexual activity: Not on file     Review of Systems   Constitution: Negative for chills and  fever.   Cardiovascular: Positive for dyspnea on exertion. Negative for chest pain, claudication, irregular heartbeat, leg swelling, orthopnea, palpitations and paroxysmal nocturnal dyspnea.   Gastrointestinal: Positive for abdominal pain. Negative for nausea and vomiting.     Objective:     Vital Signs (Most Recent):  Temp: 97.8 °F (36.6 °C) (03/17/20 0826)  Pulse: 79 (03/17/20 0826)  Resp: 18 (03/17/20 0826)  BP: 120/84 (03/17/20 0826)  SpO2: 98 % (03/17/20 0826) Vital Signs (24h Range):  Temp:  [96.4 °F (35.8 °C)-98.9 °F (37.2 °C)] 97.8 °F (36.6 °C)  Pulse:  [] 79  Resp:  [16-20] 18  SpO2:  [91 %-98 %] 98 %  BP: (120-170)/(80-98) 120/84     Weight: 56.2 kg (124 lb)  Body mass index is 18.85 kg/m².    SpO2: 98 %  O2 Device (Oxygen Therapy): nasal cannula      Intake/Output Summary (Last 24 hours) at 3/17/2020 0949  Last data filed at 3/17/2020 0500  Gross per 24 hour   Intake 1060 ml   Output 850 ml   Net 210 ml       Lines/Drains/Airways     Peripheral Intravenous Line                 Peripheral IV - Single Lumen 03/15/20 0720 20 G Left Forearm 2 days                Physical Exam   Constitutional: He is oriented to person, place, and time. No distress.   Resting comfortably in bed with NC in placea   HENT:   Head: Normocephalic and atraumatic.   Mouth/Throat: Oropharynx is clear and moist. No oropharyngeal exudate.   Poor dentition, MM moist   Eyes: Pupils are equal, round, and reactive to light. EOM are normal.   Neck: Normal range of motion. Neck supple. No JVD present.   No JVD or HJR   Cardiovascular: Normal rate, regular rhythm and intact distal pulses.   No murmur heard.  Pulmonary/Chest: Effort normal and breath sounds normal. No respiratory distress. He has no wheezes. He has no rales.   Abdominal: Soft. Bowel sounds are normal. There is tenderness (LLQ). There is no rebound and no guarding.   Musculoskeletal: Normal range of motion. He exhibits no edema.   Neurological: He is alert and oriented to  person, place, and time.   Skin: Skin is warm and dry. He is not diaphoretic.       Significant Labs: All pertinent lab results from the last 24 hours have been reviewed.    Significant Imaging: All pertinent imaging from last 24 have been reviewed

## 2020-03-17 NOTE — DISCHARGE SUMMARY
"[facility] Hospital Medicine  Discharge Summary      Patient Name: Baltazar Mccray  MRN: [id number]  Admission Date: 3/15/2020  Hospital Length of Stay: 2 days  Discharge Date and Time: 3/17/2020.  Attending Physician: Arben Ames MD   Discharging Provider: Yrn Silverman MD  Primary Care Provider: [facility] Medicine Team: [facility]OSP MED 5 Yrn Silverman MD    HPI:   Mr. Mccray is 59 years old  male patient with past medical history of COPD on home oxygen 2 L nightly, asthma, seizure disorder (last seizure 4 years). Presents to the ED with shortness of breath that started suddenly this morning while asleep on oxygen and "feeling sick" that prompted him to come the ED. Patient endorses nausea without vomiting. Patient states that sometimes he wakes up at night with sob. He sleeps on one pillow. Patient denies cough, upper respiratory symptoms, chest pain, chills, fever, palpitation, lightheadedness, lower extremity swelling, and abdominal pain. Patient denies sick contacts and recent travel. Patient states that he uses his control inhalers regularly and rescue inhaler 4 times a day. He noticed increase use of his rescue inhalers recently. Patient has multiple admissions for COPD exacerbation with most recent one in Feb 2020. Patient drinks couple of beers daily.    In the ED patient was found sating 85% at rest and 77% upon ambulation on room air, afebrile, HR 99, /89. VBG 7.305/66.2/32.9. No leukocytosis, hb 13.6, d-dimer 1.6, AST 87, ALT57, alk phos 121, .  Chest x ray showed hyperinflated lungs without vascular congestion. Respiratory infectious panel and flu are negative. CTA did not show thromoebolism and showed stable pulmonary nodule. Placed on 9L and weaned to 2L with sats of 95%. Patient given duonebs, prednisone, and azithromycin and admitted to hospital medicine for further management.      * No surgery found *  "     Hospital Course:   Patient was admitted for shortness of breath and abdominal pain. VBG 7.305/66.2/32.9. No leukocytosis, hb 13.6, d-dimer 1.6, AST 87, ALT57, alk phos 121, .  Chest x ray showed hyperinflated lungs without vascular congestion. Respiratory infectious panel and flu are negative. CTA did not show thromoebolism and showed stable pulmonary nodule. Patient was started on prednisone, duonebs, and incentive spirometry. Sob improved. Abdominal pain resolved with bowel movement. Echo showed EF 10% which a major decline to his previous echo aug 2018 with EF 60%. Cardiology consulted, recommended starting bidil 1 tablet (20 mg imdur, 37.5 hydralazine) TID, and entresto 49-51 mg bid, and did not recommend a life vest. Patient started in bidil BID instead of TID given low-normal BP. [facility] contacted and will schedule a cardiology appointment within the next week. At one point, his lactate was high and returned to normal without intervention, abdominal US did not show any stenosis in abdominal arteries. Patient was discharged in good condition and instructed to follow with his PCP.      Vitals:    03/17/20 0451 03/17/20 0826 03/17/20 1123 03/17/20 1343   BP:  120/84 110/68    BP Location:   Right arm    Patient Position:   Lying    Pulse:  79 109 78   Resp:  18 18 16   Temp: 98.9 °F (37.2 °C) 97.8 °F (36.6 °C) 98.6 °F (37 °C)    TempSrc:   Oral    SpO2:  98% (!) 94%    Weight:       Height:         Physical Exam   Constitutional: He is oriented to person, place, and time. No distress.   Resting comfortably in bed   HENT:   Head: Normocephalic and atraumatic.   Mouth/Throat: Oropharynx is clear and moist. No oropharyngeal exudate.   Poor dentition, MM moist   Eyes: Pupils are equal, round, and reactive to light. EOM are normal.   Neck: Normal range of motion. Neck supple. No JVD present.   No JVD or HJR   Cardiovascular: Normal rate, regular rhythm and intact distal pulses.   No  murmur heard.  Pulmonary/Chest: Effort normal and breath sounds normal. No respiratory distress. He has no wheezes. He has no rales.   Abdominal: Soft. Bowel sounds are normal. There is no tenderness. There is no rebound and no guarding.   Musculoskeletal: Normal range of motion. He exhibits no edema.   Neurological: He is alert and oriented to person, place, and time.   Skin: Skin is warm and dry. He is not diaphoretic.    Consults:   Consults (From admission, onward)        Status Ordering Provider     Inpatient consult to Cardiology  Once     Provider:  (Not yet assigned)    Completed ALLYSON BELL          No new Assessment & Plan notes have been filed under this hospital service since the last note was generated.  Service: Hospital Medicine    Final Active Diagnoses:    Diagnosis Date Noted POA    PRINCIPAL PROBLEM:  Acute on chronic respiratory failure with hypoxia and hypercapnia [J96.21, J96.22] 01/29/2020 Yes    HFrEF (heart failure with reduced ejection fraction) [I50.20] 03/16/2020 Unknown    Abdominal pain [R10.9] 03/16/2020 Unknown    Transaminitis [R74.0] 01/29/2020 Yes    Elevated troponin [R79.89] 08/12/2018 Yes    Tobacco abuse [Z72.0] 08/12/2018 Yes    Chronic respiratory failure with hypoxia and hypercapnia [J96.11, J96.12] 08/12/2018 Yes    Hx of seizure disorder [Z86.69] 11/20/2017 Not Applicable     Chronic    Lung nodule [R91.1] 05/19/2017 Yes    Essential hypertension [I10] 05/19/2017 Yes    COPD exacerbation [J44.1] 05/14/2017 Yes      Problems Resolved During this Admission:       Discharged Condition: good    Disposition:     Follow Up:  Follow-up Information     Rhonda Amarilys a[place]nteton On 3/24/2020.    Why:  4:30 hospital follow up appointment  Contact information:  [address]  [facility] [address]  [phone]             Schedule an appointment as soon as possible for a visit with [facility] .    Why:  Hospital Discharge  Follow Up  Contact information:  [phone]               Patient Instructions:      Ambulatory referral/consult to Cardiology   Standing Status: Future   Referral Priority: Routine Referral Type: Consultation   Referral Reason: Specialty Services Required   Requested Specialty: Cardiology   Number of Visits Requested: 1     Ambulatory referral/consult to Internal Medicine   Standing Status: Future   Referral Priority: Routine Referral Type: Consultation   Referral Reason: Specialty Services Required   Requested Specialty: Internal Medicine   Number of Visits Requested: 1           Pending Diagnostic Studies:     None         Medications:  Reconciled Home Medications:      Medication List      START taking these medications    BiDiL 20-37.5 mg Tab  Generic drug:  isosorbide-hydrALAZINE 20-37.5 mg  Take 1 tablet by mouth 2 (two) times daily.     ENTRESTO 49-51 mg per tablet  Generic drug:  sacubitriL-valsartan  Take 1 tablet by mouth 2 (two) times daily.     predniSONE 20 MG tablet  Commonly known as:  DELTASONE  Take 2 tablets (40 mg total) by mouth once daily. for 2 days  Start taking on:  March 18, 2020     senna 8.6 mg tablet  Commonly known as:  SENOKOT  Take 1 tablet by mouth daily as needed for Constipation.        CONTINUE taking these medications    albuterol 90 mcg/actuation inhaler  Commonly known as:  PROVENTIL HFA  Inhale 2 puffs into the lungs every 6 (six) hours as needed for Wheezing. Rescue     fluticasone propion-salmeterol 45-21 mcg/dose 45-21 mcg/actuation Hfaa inhaler  Commonly known as:  ADVAIR HFA  Inhale 2 puffs into the lungs every 12 (twelve) hours. Controller     ipratropium-albuteroL  mcg/actuation inhaler  Commonly known as:  COMBIVENT  Inhale 1 puff into the lungs every 6 (six) hours as needed for Wheezing. Rescue     omeprazole 20 MG capsule  Commonly known as:  PRILOSEC  Take 1 capsule (20 mg total) by mouth once daily.     tiotropium 18 mcg inhalation capsule  Commonly known  as:  SPIRIVA  Inhale 1 capsule (18 mcg total) into the lungs once daily. Controller        STOP taking these medications    losartan-hydrochlorothiazide 100-12.5 mg 100-12.5 mg Tab  Commonly known as:  HYZAAR            Indwelling Lines/Drains at time of discharge:   Lines/Drains/Airways     None                 Time spent on the discharge of patient: > 30 min minutes  Patient was seen and examined on the date of discharge and determined to be suitable for discharge.         Yrn Silverman MD  Department of Hospital Medicine  [facility]

## 2020-03-17 NOTE — PROGRESS NOTES
Patient is ready for discharge. Patient stable alert and oriented. IVs removed. No complaints of pain. Discussed discharge plan. Reviewed medications and side effects, appointments, and answered questions with patient .

## 2020-03-17 NOTE — PROGRESS NOTES
[facility]  Cardiology  Progress Note    Patient Name: Baltazar Mccray  MRN: [id number]  Admission Date: 3/15/2020  Hospital Length of Stay: 2 days  Code Status: Full Code   Attending Physician: Arben Ames MD   Primary Care Physician: Rhonda Of Katia  Expected Discharge Date: 3/17/2020  Principal Problem:Acute on chronic respiratory failure with hypoxia and hypercapnia    Subjective:     Hospital Course:   No notes on file    Interval History: NAEON. Afebrile, VSS. No CP, palpitations, LE swelling. SOB much improved.    Past Medical History:   Diagnosis Date    Asthma     COPD (chronic obstructive pulmonary disease)     home O2 at night only    Hypertension     Pneumonia     Seizures      History reviewed. No pertinent surgical history.    Review of patient's allergies indicates:   Allergen Reactions    Benazepril Swelling    Duoneb [ipratropium-albuterol]      Report Tremors       No current facility-administered medications on file prior to encounter.      Current Outpatient Medications on File Prior to Encounter   Medication Sig    fluticasone propion-salmeterol 45-21 mcg/dose (ADVAIR HFA) 45-21 mcg/actuation HFAA inhaler Inhale 2 puffs into the lungs every 12 (twelve) hours. Controller    ipratropium-albuterol (COMBIVENT)  mcg/actuation inhaler Inhale 1 puff into the lungs every 6 (six) hours as needed for Wheezing. Rescue    tiotropium (SPIRIVA) 18 mcg inhalation capsule Inhale 1 capsule (18 mcg total) into the lungs once daily. Controller    [DISCONTINUED] losartan-hydrochlorothiazide 100-12.5 mg (HYZAAR) 100-12.5 mg Tab Take 1 tablet by mouth once daily. (Patient taking differently: Take 1 tablet by mouth every other day. )    albuterol (PROVENTIL HFA) 90 mcg/actuation inhaler Inhale 2 puffs into the lungs every 6 (six) hours as needed for Wheezing. Rescue     Family History     Problem Relation (Age of Onset)    Cancer Father    Hypertension Sister     Stroke Sister, Brother        Tobacco Use    Smoking status: Current Every Day Smoker     Packs/day: 0.25     Types: Cigarettes    Smokeless tobacco: Never Used    Tobacco comment: 1-2 cigs per day   Substance and Sexual Activity    Alcohol use: Yes     Alcohol/week: 2.0 standard drinks     Types: 2 Cans of beer per week     Comment: every night    Drug use: No    Sexual activity: Not on file     Review of Systems   Constitution: Negative for chills and fever.   Cardiovascular: Positive for dyspnea on exertion. Negative for chest pain, claudication, irregular heartbeat, leg swelling, orthopnea, palpitations and paroxysmal nocturnal dyspnea.   Gastrointestinal: Positive for abdominal pain. Negative for nausea and vomiting.     Objective:     Vital Signs (Most Recent):  Temp: 97.8 °F (36.6 °C) (03/17/20 0826)  Pulse: 79 (03/17/20 0826)  Resp: 18 (03/17/20 0826)  BP: 120/84 (03/17/20 0826)  SpO2: 98 % (03/17/20 0826) Vital Signs (24h Range):  Temp:  [96.4 °F (35.8 °C)-98.9 °F (37.2 °C)] 97.8 °F (36.6 °C)  Pulse:  [] 79  Resp:  [16-20] 18  SpO2:  [91 %-98 %] 98 %  BP: (120-170)/(80-98) 120/84     Weight: 56.2 kg (124 lb)  Body mass index is 18.85 kg/m².    SpO2: 98 %  O2 Device (Oxygen Therapy): nasal cannula      Intake/Output Summary (Last 24 hours) at 3/17/2020 0949  Last data filed at 3/17/2020 0500  Gross per 24 hour   Intake 1060 ml   Output 850 ml   Net 210 ml       Lines/Drains/Airways     Peripheral Intravenous Line                 Peripheral IV - Single Lumen 03/15/20 0720 20 G Left Forearm 2 days                Physical Exam   Constitutional: He is oriented to person, place, and time. No distress.   Resting comfortably in bed with NC in placea   HENT:   Head: Normocephalic and atraumatic.   Mouth/Throat: Oropharynx is clear and moist. No oropharyngeal exudate.   Poor dentition, MM moist   Eyes: Pupils are equal, round, and reactive to light. EOM are normal.   Neck: Normal range of motion. Neck  supple. No JVD present.   No JVD or HJR   Cardiovascular: Normal rate, regular rhythm and intact distal pulses.   No murmur heard.  Pulmonary/Chest: Effort normal and breath sounds normal. No respiratory distress. He has no wheezes. He has no rales.   Abdominal: Soft. Bowel sounds are normal. There is tenderness (LLQ). There is no rebound and no guarding.   Musculoskeletal: Normal range of motion. He exhibits no edema.   Neurological: He is alert and oriented to person, place, and time.   Skin: Skin is warm and dry. He is not diaphoretic.       Significant Labs: All pertinent lab results from the last 24 hours have been reviewed.    Significant Imaging: All pertinent imaging from last 24 have been reviewed    Assessment and Plan:       HFrEF (heart failure with reduced ejection fraction)  59 M with h/o HTN, HLD here for COPD exacerbation; cars consulted for new HFrEF of 10% and G1DD. TTE with normal LV size, no dilation. No h/o typical/atypial angina, no palpitations, no orthopnea, no PND, no personal cardiac history.  on admit, trop flat 0.063 (peak 0.072), appears clinically euvolemic with no JVD/HJR/rales/BLE edema. EKG with NSR and anterior TWI. Unclear etiology of worsening heart fxn at this time: ischemic heart disease most likely (history of HLD and continued daily smoking), no dilation seen on echo, no valvular dz seen on echo, hypertensive CM possible with uncontrolled HTN noted per chart check but no hypertrophy noted on echo.      - No stress test or angiography at this time  - Discontinue losartan-HCTZ  - Start bidil 1 tablet (20 mg imdur, 37.5 hydralazine) tid  - Start entresto 49-51 mg bid to start evening of 3/17 (need 36 hr washout of losartan)  - Discontinue lasix  - No BB for now in light of h/o severe COPD   - Will need outpatient cardiology f/u      Will sign off at this time. Please re-consult with further questions. Thank you.    VTE Risk Mitigation (From admission, onward)          Ordered     enoxaparin injection 40 mg  Daily      03/15/20 1907     IP VTE HIGH RISK PATIENT  Once      03/15/20 1907                Jeff Rhodes MD  Cardiology  [facility]

## 2020-03-18 NOTE — PLAN OF CARE
03/18/20 0715   Final Note   Assessment Type Final Discharge Note   Anticipated Discharge Disposition Home   Hospital Follow Up  Appt(s) scheduled? Yes

## 2020-03-19 ENCOUNTER — PATIENT OUTREACH (OUTPATIENT)
Dept: ADMINISTRATIVE | Facility: CLINIC | Age: 60
End: 2020-03-19

## 2020-03-19 DIAGNOSIS — J96.22 ACUTE ON CHRONIC RESPIRATORY FAILURE WITH HYPOXIA AND HYPERCAPNIA: Primary | ICD-10-CM

## 2020-03-19 DIAGNOSIS — J96.21 ACUTE ON CHRONIC RESPIRATORY FAILURE WITH HYPOXIA AND HYPERCAPNIA: Primary | ICD-10-CM

## 2020-03-19 NOTE — PHYSICIAN QUERY
PT Name: Baltazar Mccray  MR #: [id number]     PHYSICIAN QUERY -  ACUITY OF CONDITION CLARIFICATION      CDS/: Paty Evans   RN CCDS            Contact information:[email or web address]  This form is a permanent document in the medical record.     Query Date: March 19, 2020    By submitting this query, we are merely seeking further clarification of documentation to reflect the severity of illness of your patient. Please utilize your independent clinical judgment when addressing the question(s) below.    The Medical record reflects the following:     Indicators   Supporting Clinical Findings Location in Medical Record   x Documentation of condition cards consulted for new HFrEF of 10% and G1DD. TTE with normal LV size, no dilation. No h/o typical/atypial angina, no palpitations, no orthopnea, no PND, no personal cardiac history.  on admit, trop flat 0.063 (peak 0.072), appears clinically euvolemic with no JVD/HJR/rales/BLE edema. EKG with NSR and anterior TWI. Unclear etiology of worsening heart fxn at this time: ischemic heart disease most likely (history of HLD and continued daily smoking), no dilation seen on echo, no valvular dz seen on echo, hypertensive CM possible with uncontrolled HTN noted per chart check but no hypertrophy noted on echo Cardiology PN 3/17   x Lab Value(s) NPB=[id number] Lab 3/15   x Radiology Findings Lungs are symmetrically expanded without consolidation.  No pleural effusion. CTA 3/15   x Treatment                                 Medication Lasix 40 mg IV x 1 3/16  Lasix 20 mg PO daily 3/17    No stress test or angiography at this time  - Discontinue losartan-HCTZ  - Start bidil 1 tablet (20 mg imdur, 37.5 hydralazine) tid  - Start entresto 49-51 mg bid to start evening of 3/17 (need 36 hr washout of losartan)  - Discontinue lasix  - No BB for now in light of h/o severe COPD   - Will need outpatient cardiology f/u Cardiology PN 3/17    Other       Provider, please specify the  acuity/chronicity of __Systolic CHF____________:    [ x ] Acute   [   ] Chronic   [   ] Acute and/on chronic   [   ]  Clinically Undetermined       Please document in your progress notes daily for the duration of treatment until resolved, and include in your discharge summary.

## 2020-03-19 NOTE — PROGRESS NOTES
Patient states that he is taking Bidil once daily and that's how his bottle states to take med. Also states that he did not receive Entresto. Called pharmacy verified with Jigna that pt received Entresto and directions for Bidil states one tab twice daily. Called patient back, explained about meds and conversation with pharmacist. Patient states that his bottle does say twice daily and he does have Entresto. Patient also states that he understands how to take medications. Patient also states that he is taking Amlodipine 10 mg once daily, med is not on patient's list. Advised pt to bring meds and d/c paperwork to PCP. Also offered NP program, patient accepted. Referral placed.

## 2020-03-19 NOTE — PATIENT INSTRUCTIONS

## 2020-03-21 LAB
BACTERIA BLD CULT: NORMAL
BACTERIA BLD CULT: NORMAL

## 2020-03-27 ENCOUNTER — CARE AT HOME (OUTPATIENT)
Dept: HOME HEALTH SERVICES | Facility: CLINIC | Age: 60
End: 2020-03-27
Payer: MEDICAID

## 2020-03-27 DIAGNOSIS — Z86.69 HX OF SEIZURE DISORDER: ICD-10-CM

## 2020-03-27 DIAGNOSIS — R91.1 LUNG NODULE: ICD-10-CM

## 2020-03-27 DIAGNOSIS — I10 ESSENTIAL HYPERTENSION: ICD-10-CM

## 2020-03-27 DIAGNOSIS — J96.22 ACUTE ON CHRONIC RESPIRATORY FAILURE WITH HYPOXIA AND HYPERCAPNIA: ICD-10-CM

## 2020-03-27 DIAGNOSIS — J96.21 ACUTE ON CHRONIC RESPIRATORY FAILURE WITH HYPOXIA AND HYPERCAPNIA: ICD-10-CM

## 2020-03-27 DIAGNOSIS — Z72.0 TOBACCO ABUSE: ICD-10-CM

## 2020-03-27 DIAGNOSIS — I50.20 SYSTOLIC HEART FAILURE, UNSPECIFIED HF CHRONICITY: ICD-10-CM

## 2020-03-27 DIAGNOSIS — Z09 HOSPITAL DISCHARGE FOLLOW-UP: Primary | ICD-10-CM

## 2020-03-27 DIAGNOSIS — J44.1 COPD EXACERBATION: ICD-10-CM

## 2020-03-27 PROCEDURE — 99495 TRANSJ CARE MGMT MOD F2F 14D: CPT | Mod: S$GLB,,, | Performed by: NURSE PRACTITIONER

## 2020-03-27 PROCEDURE — 99495 TCM SERVICES (MODERATE COMPLEXITY): ICD-10-PCS | Mod: S$GLB,,, | Performed by: NURSE PRACTITIONER

## 2020-03-27 NOTE — PROGRESS NOTES
"  [facility]  Transition of Care Home Visit    Visit Date: 3/29/2020  Encounter Provider: Della Rodriguez NP  PCP:  [facility]    PRESENTING HISTORY      Patient ID: Baltazar Mccray is a 59 y.o. male.    Consult Requested By:  Dr. Yrn Silverman  Reason for Consult:  [facility]/Recent hospitalization    Baltazar is being seen at home due to transportation limitations    Chief Complaint: Hospital Follow Up      History of Present Illness: Mr. Baltazar Mccray is a 59 y.o. male who was recently admitted to the hospital.    Admit: 3/15/2020   Discharge: 3/17/2020  HPI:   Mr. Mccray is 59 years old  male patient with past medical history of COPD on home oxygen 2 L nightly, asthma, seizure disorder (last seizure 4 years). Presents to the ED with shortness of breath that started suddenly this morning while asleep on oxygen and "feeling sick" that prompted him to come the ED. Patient endorses nausea without vomiting. Patient states that sometimes he wakes up at night with sob. He sleeps on one pillow. Patient denies cough, upper respiratory symptoms, chest pain, chills, fever, palpitation, lightheadedness, lower extremity swelling, and abdominal pain. Patient denies sick contacts and recent travel. Patient states that he uses his control inhalers regularly and rescue inhaler 4 times a day. He noticed increase use of his rescue inhalers recently. Patient has multiple admissions for COPD exacerbation with most recent one in Feb 2020. Patient drinks couple of beers daily.     In the ED patient was found sating 85% at rest and 77% upon ambulation on room air, afebrile, HR 99, /89. VBG 7.305/66.2/32.9. No leukocytosis, hb 13.6, d-dimer 1.6, AST 87, ALT57, alk phos 121, .  Chest x ray showed hyperinflated lungs without vascular congestion. Respiratory infectious panel and flu are negative. CTA did not show thromoebolism and showed stable pulmonary nodule. Placed on 9L and weaned to 2L " "with sats of 95%. Patient given duonebs, prednisone, and azithromycin and admitted to hospital medicine for further management.       * No surgery found *       Hospital Course:   Patient was admitted for shortness of breath and abdominal pain. VBG 7.305/66.2/32.9. No leukocytosis, hb 13.6, d-dimer 1.6, AST 87, ALT57, alk phos 121, .  Chest x ray showed hyperinflated lungs without vascular congestion. Respiratory infectious panel and flu are negative. CTA did not show thromoebolism and showed stable pulmonary nodule. Patient was started on prednisone, duonebs, and incentive spirometry. Sob improved. Abdominal pain resolved with bowel movement. Echo showed EF 10% which a major decline to his previous echo aug 2018 with EF 60%. Cardiology consulted, recommended starting bidil 1 tablet (20 mg imdur, 37.5 hydralazine) TID, and entresto 49-51 mg bid, and did not recommend a life vest. Patient started in bidil BID instead of TID given low-normal BP. [facility] contacted and will schedule a cardiology appointment within the next week. At one point, his lactate was high and returned to normal without intervention, abdominal US did not show any stenosis in abdominal arteries. Patient was discharged in good condition and instructed to follow with his PCP.       ___________________________________________________________________    Today:    HPI:  Baltazar is being seen today 10 days after discharge from a hospitalization for shortness of breath and "feeling sick". See Hospital Course above. He is found at his apartment today wearing 2 liters of oxygen via nasal cannula in no distress. He is AAO x 3 and wants to know when he can go back to work, he works part time at a grocery store. His sister, Viry, is on speaker phone providing history as patient is a poor historian. Viry is requesting assistance with getting disability for the patient.     Baltazar reports "I feel good" since being home. He " continues to smoke a 1/2 PPD of cigarettes but does not wear his oxygen when smoking reportedly. He does go on to report that he feels tired all of the time but denies any pain. Viry states he is taking his medications as prescribed as she oversees that. Viry states that patient has a follow up appointment with Cardiology in a week and she will be making sure he keeps that appointment.    No fevers, chills or loss of appetite are reported since being home from the hospital.       Review of Systems   Constitutional: Positive for fatigue. Negative for chills, diaphoresis and fever.   HENT: Negative for congestion, rhinorrhea and sore throat.    Respiratory: Positive for shortness of breath. Negative for cough and chest tightness.    Cardiovascular: Negative for chest pain, palpitations and leg swelling.   Gastrointestinal: Positive for nausea. Negative for abdominal distention, abdominal pain, blood in stool and vomiting.   Endocrine: Negative for polyuria.   Genitourinary: Negative for decreased urine volume, dysuria, flank pain and hematuria.   Skin: Negative for rash.   Allergic/Immunologic: Negative for food allergies and immunocompromised state.   Neurological: Positive for tremors. Negative for dizziness, seizures and light-headedness.   Psychiatric/Behavioral: Negative for confusion.     Assessments:  · Environmental: unkempt, dark not well lit, patient and nephew report high drug activity in this apartment complex, recommended not walking alone up to and from apartment complex  · Functional Status: independent  · Safety: patient smokes cigarettes, wears oxygen  · Nutritional: appears adequate  · Home Health/DME/Supplies: No Home Health, oxygen concentrator    PAST HISTORY:     Past Medical History:   Diagnosis Date    Asthma     COPD (chronic obstructive pulmonary disease)     home O2 at night only    Coronary artery disease     Hypertension     Pneumonia     Seizures        History reviewed. No  pertinent surgical history.    Family History   Problem Relation Age of Onset    Cancer Father     Hypertension Sister     Stroke Sister     Stroke Brother     Diabetes Neg Hx     Heart disease Neg Hx        Social History     Socioeconomic History    Marital status: Single     Spouse name: Not on file    Number of children: Not on file    Years of education: Not on file    Highest education level: Not on file   Occupational History    Not on file   Social Needs    Financial resource strain: Not on file    Food insecurity:     Worry: Not on file     Inability: Not on file    Transportation needs:     Medical: Not on file     Non-medical: Not on file   Tobacco Use    Smoking status: Current Every Day Smoker     Packs/day: 0.25     Types: Cigarettes    Smokeless tobacco: Never Used    Tobacco comment: 1-2 cigs per day   Substance and Sexual Activity    Alcohol use: Yes     Alcohol/week: 2.0 standard drinks     Types: 2 Cans of beer per week     Comment: every night    Drug use: No    Sexual activity: Not Currently   Lifestyle    Physical activity:     Days per week: Not on file     Minutes per session: Not on file    Stress: Not on file   Relationships    Social connections:     Talks on phone: Not on file     Gets together: Not on file     Attends [employer] service: Not on file     Active member of club or organization: Not on file     Attends meetings of clubs or organizations: Not on file     Relationship status: Not on file   Other Topics Concern    Not on file   Social History Narrative    Patient' nephew is currently living with him in his apartment. Patient's sister Viry ([phone]) helps manage patient's care.       MEDICATIONS & ALLERGIES:     Current Outpatient Medications on File Prior to Visit   Medication Sig Dispense Refill    albuterol (PROVENTIL HFA) 90 mcg/actuation inhaler Inhale 2 puffs into the lungs every 6 (six) hours as needed for Wheezing. Rescue 18 g 11     fluticasone propion-salmeterol 45-21 mcg/dose (ADVAIR HFA) 45-21 mcg/actuation HFAA inhaler Inhale 2 puffs into the lungs every 12 (twelve) hours. Controller      ipratropium-albuterol (COMBIVENT)  mcg/actuation inhaler Inhale 1 puff into the lungs every 6 (six) hours as needed for Wheezing. Rescue 4 g 11    isosorbide-hydrALAZINE 20-37.5 mg (BIDIL) 20-37.5 mg Tab Take 1 tablet by mouth 2 (two) times daily. (Patient taking differently: Take 1 tablet by mouth once daily. ) 60 tablet 11    omeprazole (PRILOSEC) 20 MG capsule Take 1 capsule (20 mg total) by mouth once daily. 30 capsule 1    sacubitriL-valsartan (ENTRESTO) 49-51 mg per tablet Take 1 tablet by mouth 2 (two) times daily. 60 tablet 2    senna (SENOKOT) 8.6 mg tablet Take 1 tablet by mouth daily as needed for Constipation.      tiotropium (SPIRIVA) 18 mcg inhalation capsule Inhale 1 capsule (18 mcg total) into the lungs once daily. Controller 30 capsule 11     No current facility-administered medications on file prior to visit.         Review of patient's allergies indicates:   Allergen Reactions    Benazepril Swelling    Duoneb [ipratropium-albuterol]      Report Tremors       OBJECTIVE:     Vital Signs:  Vitals:    03/27/20 1314   BP: (!) 142/76   Pulse: 100   Resp: 20   Temp: 99.3 °F (37.4 °C)     Body mass index is 19.77 kg/m².     Physical Exam:  Physical Exam   Constitutional: He is oriented to person, place, and time.   HENT:   Head: Normocephalic and atraumatic.   Mouth/Throat: Abnormal dentition.   Eyes: Pupils are equal, round, and reactive to light. EOM are normal.   Cardiovascular: Normal rate, regular rhythm, normal heart sounds and intact distal pulses. Exam reveals no gallop and no friction rub.   No murmur heard.  Pulmonary/Chest: No respiratory distress. He has decreased breath sounds. No wheezes or rhonci heard  Musculoskeletal: He exhibits no edema or tenderness.   Neurological: He is alert and oriented to person, place, and  time. He displays tremor.      Laboratory  Lab Results   Component Value Date    WBC 9.18 03/17/2020    HGB 16.3 03/17/2020    HCT 51.1 03/17/2020    MCV 83 03/17/2020     03/17/2020     Lab Results   Component Value Date    INR 1.0 01/03/2020    INR 1.1 11/30/2019    INR 1.1 05/14/2017     Lab Results   Component Value Date    HGBA1C 5.4 03/15/2020     No results for input(s): POCTGLUCOSE in the last 72 hours.    Diagnostic Results:  TTE 3/16/2020  · Severely decreased left ventricular systolic function. The estimated ejection fraction is 10%.  · Grade I (mild) left ventricular diastolic dysfunction consistent with impaired relaxation.  · Moderate left atrial enlargement.  · Moderately reduced right ventricular systolic function.  · Intermediate central venous pressure (8 mmHg).  · The estimated PA systolic pressure is 22 mmHg.         TRANSITION OF CARE:     [facility] Contact Note: 3/19/2020    Family and/or Caretaker present at visit?  Yes.  Diagnostic tests reviewed/disposition: No diagnosic tests pending after this hospitalization.  Disease/illness education: Oxygen use/smoking cessation/CHF  Home health/community services discussion/referrals: Patient does not have home health established from hospital visit.  They do not need home health.  If needed, we will set up home health for the patient.   Establishment or re-establishment of referral orders for community resources: referral put in for case management for assistance with disability paperwork per sister Viry.   Discussion with other health care providers: No discussion with other health care providers necessary.     Transition of Care Visit:     I have reviewed and updated the history and problem list.  I have reconciled the medication list.  I have discussed the hospitalization and current medical issues, prognosis and plans with the patient/family.  I  spent more than 50% of time discussing the care with the patient/family.  Total  Face-to-Face Encounter: 60 minutes.    Medications Reconciliation:   I have reconciled the patient's home medications and discharge medications with the patient/family. I have updated all changes.  Refer to After-Visit Medication List.    ASSESSMENT & PLAN:     HIGH RISK CONDITION(S): EF 10%, oxygen dependent, history of seizure disorder      Baltazar was seen today for hospital follow up.    Diagnoses and all orders for this visit:    Hospital discharge follow-up    Acute on chronic respiratory failure with hypoxia and hypercapnia  -     Ambulatory referral/consult to [facility]    Systolic heart failure, unspecified HF chronicity    Tobacco abuse    Hx of seizure disorder    Lung nodule    Essential hypertension    COPD exacerbation    [facility] contact information left with patient today.    Were controlled substances prescribed?  No    Instructions for the patient:  Keep all follow up appointments.  Take all medications as prescribed.  No smoking with oxygen in use.  Oxygen safety and educations.  Fall precautions and safety.  Smoking cessation highly recommended.      Scheduled Follow-up :  Future Appointments   Date Time Provider Department Center   4/2/2020 10:00 AM Randy Edmonds III, MD [facility] [place]       After Visit Medication List :     Medication List           Accurate as of March 27, 2020 11:59 PM. If you have any questions, ask your nurse or doctor.               CHANGE how you take these medications    BiDiL 20-37.5 mg Tab  Generic drug:  isosorbide-hydrALAZINE 20-37.5 mg  Take 1 tablet by mouth 2 (two) times daily.  What changed:  when to take this        CONTINUE taking these medications    albuterol 90 mcg/actuation inhaler  Commonly known as:  PROVENTIL HFA  Inhale 2 puffs into the lungs every 6 (six) hours as needed for Wheezing. Rescue     ENTRESTO 49-51 mg per tablet  Generic drug:  sacubitriL-valsartan  Take 1 tablet by mouth 2 (two) times daily.      fluticasone propion-salmeterol 45-21 mcg/dose 45-21 mcg/actuation Hfaa inhaler  Commonly known as:  ADVAIR HFA     ipratropium-albuteroL  mcg/actuation inhaler  Commonly known as:  COMBIVENT  Inhale 1 puff into the lungs every 6 (six) hours as needed for Wheezing. Rescue     omeprazole 20 MG capsule  Commonly known as:  PRILOSEC  Take 1 capsule (20 mg total) by mouth once daily.     senna 8.6 mg tablet  Commonly known as:  SENOKOT  Take 1 tablet by mouth daily as needed for Constipation.     tiotropium 18 mcg inhalation capsule  Commonly known as:  SPIRIVA  Inhale 1 capsule (18 mcg total) into the lungs once daily. Controller        Consent for visit obtained from patient today.    Signature:  Della Rodriguez NP     Attestation:   Screening criteria to assess the level of the patient's risk for infection with COVID-19 as recommended by the CDC at the time of the above documented home visit concluded appropriateness to proceed. Universal precautions were maintained at all times, including provider use of 60% alcohol gel hand  immediately prior to entry and upon departure of patient's home as well as cleaning of equipment used in home visit with antibacterial/germicidal disposable wipes.

## 2020-03-29 VITALS
DIASTOLIC BLOOD PRESSURE: 76 MMHG | WEIGHT: 130 LBS | OXYGEN SATURATION: 98 % | RESPIRATION RATE: 20 BRPM | BODY MASS INDEX: 19.7 KG/M2 | SYSTOLIC BLOOD PRESSURE: 142 MMHG | HEART RATE: 100 BPM | TEMPERATURE: 99 F | HEIGHT: 68 IN

## 2020-03-29 PROBLEM — Z09 HOSPITAL DISCHARGE FOLLOW-UP: Status: ACTIVE | Noted: 2020-03-29

## 2020-03-29 NOTE — PATIENT INSTRUCTIONS
Left-Sided Congestive Heart Failure (CHF)    The heart is a large muscle that pumps blood throughout the body. Blood carries oxygen to all the organs (including the brain), muscles, and skin of your body. After the body takes the oxygen out of the blood, the blood returns to the heart. The right side of the heart collects that blood and pumps it to the lungs to receive fresh oxygen. This oxygen-rich blood from the lungs then returns to the left side of the heart, where it is pumped back out to the brain and rest of the body, starting the process all over.   Heart failure occurs when the heart muscle is weakened. This can occur after a heart attack or with significant coronary artery disease. This affects the pumping action of the heart.   When the left side of the heart is weakened, it cant handle the blood it is getting from the lungs. Pressure then builds up in the veins of the lungs, causing fluid to leak into the lung tissues. This may be referred to as congestive heart failure. This causes you to feel short of breath, weak, or dizzy. These symptoms are often worse with exertion, such as climbing stairs or walking up hills. Lying flat is uncomfortable and can make your breathing worse. This may make sleeping difficult and force you to use extra pillows to elevate your upper body to help you sleep well.  Causes of heart failure include:  · Coronary artery disease  · Heart attack in the past (also known as acute myocardial infarction, or AMI)  · High blood pressure  · Damaged heart valve  · Diabetes  · Obesity  · Cigarette smoking  · Alcohol abuse  Treatment  Heart failure is a chronic condition. There is no cure. The purpose of medical treatment is to improve the pumping action of the heart, and remove excess water and fluids from the body. A number of medicines can help reach this goal, improve symptoms and keep the heart from becoming weaker. In some cases of severe heart failure, a mechanical device will be  placed in the heart to help the heart pump. Another major goal is to better treat the causes of heart failure, such as diabetes, high blood pressure, and your lifestyle.  Home care  · Check your weight every day. A sudden increase in weight gain could mean worsening heart failure.  ¨ Use the same scale every day.  ¨ Weigh yourself at the same time every day.  ¨ Make sure the scale is on the floor, not on a rug.  ¨ Keep a record of your weight every day, so your healthcare provider can see it. If you are not given a log sheet for this, keep a separate journal for this purpose.   · Cut back on how much salt (sodium) you eat:  ¨ Your provider will tell you how much salt to have daily, usually 2,000 mg or less.  ¨ Avoid high-salt foods. These include olives, pickles, smoked meats, processed foods, and salted potato chips.  ¨ Don't add salt to your food at the table. Use only small amounts of salt when cooking.  ¨ Avoid binging on salt-heavy meals.  · Follow your healthcare provider's recommendations about how much fluid you should have.  · Stop smoking.  · Cut back on the amount of alcohol you drink.  · Lose weight if you are overweight. The excess weight adds a lot of stress on the workload of the heart.  · Stay active. Talk to your provider about an exercise program that is safe for your heart.  · Keep your feet elevated to reduce swelling. Ask your provider about support hose as a preventive treatment for daytime leg swelling.  · Follow your healthcare provider's instructions closely.  Besides taking your medicine as instructed, an important part of treatment includes lifestyle changes. These include diet, physical activity, stopping smoking, and weight control.  Improve your diet. Often in the hospital, people are given a heart healthy diet. This includes more fresh foods, lower saturated fat, less processed foods, and lower salt.  Follow-up care  Follow up with your healthcare provider, or as advised. Make sure to  keep any appointments that were made for you. This can help better control heart failure.  If an X-ray was done, you will be told of any new findings that may affect your care.  Call 911  Call 911 if you:  · Become severely short of breath  · Feel lightheaded, or feel like you might pass out or faint  · Have chest pain or discomfort that is different than usual, the medicines your provider told you to use for this don't help, or the pain lasts longer than 10 to 15 minutes  · Develop a rapid heart rate suddenly  When to seek medical advice  Call your healthcare provider right away if you have any of these signs of worsening heart failure:  · Sudden weight gain --  more than 2 pounds in 1 day, or 5 pounds in 1 week, or whatever weight gain you were told to report by your provider  · Trouble breathing not related to being active  · New or increased swelling of your legs or ankles  · Swelling or pain in your abdomen  · Breathing trouble at night, waking up short of breath or needing more pillows to elevate your upper body to help you breathe  · Frequent coughing that doesnt go away  · Feeling much more tired than usual  Date Last Reviewed: 1/4/2016  © 5971-7204 [employer]. [address] [place] [address], [place] [address]. All rights reserved. This information is not intended as a substitute for professional medical care. Always follow your healthcare professional's instructions.        How to Quit Smoking  Smoking is one of the hardest habits to break. About half of all people who have ever smoked have been able to quit. Most people who still smoke want to quit. Here are some of the best ways to stop smoking.    Keep trying  Most smokers make many attempts at quitting before they are successful. Its important not to give up.  Go cold turkey  Most former smokers quit cold turkey (all at once). Trying to cut back gradually doesn't seem to work as well, perhaps because it continues the smoking habit. Also, it is  possible to inhale more while smoking fewer cigarettes. This results in the same amount of nicotine in your body.  Get support  Support programs can be a big help, especially for heavy smokers. These groups offer lectures, ways to change behavior, and peer support. Here are some ways to find a support program:  · Free national quitline: 800-QUIT-NOW (707-201-5453).  · Hospital quit-smoking programs.  · American Lung Association: (416.417.8302).  · American Cancer Society (610-773-4683).  Support at home is important too. Nonsmokers can offer praise and encouragement. If the smoker in your life finds it hard to quit, encourage them to keep trying.  Over-the-counter medicines  Nicotine replacement therapy may make quitting easier. Certain aids, such as the nicotine patch, gum, and lozenges, are available without a prescription. It is best to use these under a doctors care, though. The skin patch provides a steady supply of nicotine. Nicotine gum and lozenges give temporary bursts of low levels of nicotine. Both methods reduce the craving for cigarettes. Warning: If you have nausea, vomiting, dizziness, weakness, or a fast heartbeat, stop using these products and see your doctor.  Prescription medicines  After reviewing your smoking patterns and past attempts to quit, your doctor may offer a prescription medicine such as bupropion, varenicline, a nicotine inhaler, or nasal spray. Each has advantages and side effects. Your doctor can review these with you.  Health benefits of quitting  The benefits of quitting start right away and keep improving the longer you go without smoking. These benefits occur at any age.  So whether you are 17 or 70, quitting is a good decision. Some of the benefits include:  · 20 minutes: Blood pressure and pulse return to normal.  · 8 hours: Oxygen levels return to normal.  · 2 days: Ability to smell and taste begin to improve as damaged nerves regrow.  · 2 to 3 weeks: Circulation and lung  "function improve.  · 1 to 9 months: Coughing, congestion, and shortness of breath decrease; tiredness decreases.  · 1 year: Risk of heart attack decreases by half.  · 5 years: Risk of lung cancer decreases by half; risk of stroke becomes the same as a nonsmokers.  For more on how to quit smoking, try these online resources:   · [employer] System[employer].gov  · "Clearing the Air" booklet from the National Cancer Hestand: smokefree.gov/sites/default/files/pdf/clearing-the-air-accessible.pdf  Date Last Reviewed: 3/1/2017  © 5015-9122 [employer]. [address] [place], [place] [id number]. All rights reserved. This information is not intended as a substitute for professional medical care. Always follow your healthcare professional's instructions.        Using Oxygen at Home  Your healthcare provider has prescribed oxygen to help make breathing easier for you. You will be shown how to use your oxygen unit. Below are some guidelines on using oxygen at home safely. Do all steps each time you use your oxygen unit.   Note: Instructions will vary based on the type of oxygen device you use.    Step 1. Check your supply  · Pressurize your oxygen tank (compressed oxygen tanks only). Other devices may simply be switched on. Make sure you follow the instructions provided by your healthcare provider or medical equipment company.  · Check the oxygen supply level on the tank to be sure you have enough. Your medical supply company will tell you when to call them to let them know that you need more oxygen. Or they will deliver your oxygen on a regular schedule.  · If you have a humidifier bottle, check the water level. When it is at or below 1/2 full, refill it with sterile or distilled water.  Step 2. Attach the tubing  · Attach the cannula tubing (long oxygen tubing) to your oxygen source as you have been shown.  · Be sure the tubing is not bent or blocked.  Step 3. Set your prescribed flow rate  · Set the oxygen to flow at the rate " your healthcare provider has prescribed. This is _____________.  · Never change this rate unless told to by your healthcare provider.  Step 4. Insert the cannula  · Insert the nasal cannula into your nose and breathe through your nose normally.  · If youre not sure whether oxygen is flowing, place the nasal cannula in a glass of water. Bubbles mean that oxygen is flowing.  Follow safety guidelines when using oxygen in your home  · Avoid open flames. This includes cigarettes, matches, candles, fireplaces, gas burners, pipes, or anything else that could start a fire.  · Don't smoke or be around others who are smoking.  · Keep oxygen tanks at least 5 feet from gas stoves, space heaters, electric or gas heaters, or any heat sources.  · Don't use lotions or creams that contain petroleum jelly. This substance can be flammable when mixed with pure oxygen.  · Turn oxygen off when you aren't using it.  · Store the oxygen cylinder upright in a secure, approved storage device.   · Make sure you know what to do in an emergency. Your emergency numbers should include 911 (or your area's emergency number), your healthcare provider, and your medical supply company.  · Always follow the instructions for safe use as recommended by your medical supply company. Not using oxygen safely at home can put you and your neighbors at higher risk for fires and burns.   Maintain your equipment  Ask your medical supply company how often you should change your nasal cannula tubing, your cannula, and your humidifier bottle, if you have one.  Date Last Reviewed: 5/1/2016 © 2000-2017 [employer]. [address] [place] [address], [place] [address]. All rights reserved. This information is not intended as a substitute for professional medical care. Always follow your healthcare professional's instructions.

## 2020-03-31 ENCOUNTER — SSC ENCOUNTER (OUTPATIENT)
Dept: ADMINISTRATIVE | Facility: OTHER | Age: 60
End: 2020-03-31

## 2020-03-31 NOTE — PROGRESS NOTES
Please note the following patient's information has been forwarded to the [employer] case management office on 3/31/2020.    Please contact Outpatient Care Management at Ext. [phone] with questions.    Thank you,    Rossana Jefferson, [facility]  Outpatient [facility] Mg[facility]nt  [phone]

## 2020-04-03 ENCOUNTER — HOSPITAL ENCOUNTER (INPATIENT)
Facility: HOSPITAL | Age: 60
LOS: 1 days | Discharge: HOME | DRG: 191 | End: 2020-04-04
Attending: EMERGENCY MEDICINE | Admitting: EMERGENCY MEDICINE
Payer: MEDICAID

## 2020-04-03 DIAGNOSIS — I50.20 SYSTOLIC HEART FAILURE, UNSPECIFIED HF CHRONICITY: ICD-10-CM

## 2020-04-03 DIAGNOSIS — J96.21 ACUTE ON CHRONIC RESPIRATORY FAILURE WITH HYPOXIA: Primary | ICD-10-CM

## 2020-04-03 DIAGNOSIS — I50.9 HEART FAILURE: ICD-10-CM

## 2020-04-03 DIAGNOSIS — J44.1 COPD EXACERBATION: ICD-10-CM

## 2020-04-03 PROBLEM — E87.20 LACTIC ACID INCREASED: Status: ACTIVE | Noted: 2020-04-03

## 2020-04-03 LAB
ALBUMIN SERPL BCP-MCNC: 3.4 G/DL (ref 3.5–5.2)
ALP SERPL-CCNC: 113 U/L (ref 55–135)
ALT SERPL W/O P-5'-P-CCNC: 34 U/L (ref 10–44)
ANION GAP SERPL CALC-SCNC: 15 MMOL/L (ref 8–16)
AST SERPL-CCNC: 50 U/L (ref 10–40)
BASOPHILS # BLD AUTO: 0.1 K/UL (ref 0–0.2)
BASOPHILS NFR BLD: 1.8 % (ref 0–1.9)
BILIRUB SERPL-MCNC: 0.4 MG/DL (ref 0.1–1)
BNP SERPL-MCNC: 28 PG/ML (ref 0–99)
BUN SERPL-MCNC: 9 MG/DL (ref 6–20)
CALCIUM SERPL-MCNC: 8.4 MG/DL (ref 8.7–10.5)
CHLORIDE SERPL-SCNC: 98 MMOL/L (ref 95–110)
CK SERPL-CCNC: 129 U/L (ref 20–200)
CO2 SERPL-SCNC: 25 MMOL/L (ref 23–29)
CREAT SERPL-MCNC: 0.9 MG/DL (ref 0.5–1.4)
CRP SERPL-MCNC: 13.1 MG/L (ref 0–8.2)
DIFFERENTIAL METHOD: ABNORMAL
EOSINOPHIL # BLD AUTO: 0.2 K/UL (ref 0–0.5)
EOSINOPHIL NFR BLD: 3.7 % (ref 0–8)
ERYTHROCYTE [DISTWIDTH] IN BLOOD BY AUTOMATED COUNT: 17.4 % (ref 11.5–14.5)
EST. GFR  (AFRICAN AMERICAN): >60 ML/MIN/1.73 M^2
EST. GFR  (NON AFRICAN AMERICAN): >60 ML/MIN/1.73 M^2
FERRITIN SERPL-MCNC: 540 NG/ML (ref 20–300)
GLUCOSE SERPL-MCNC: 75 MG/DL (ref 70–110)
HCT VFR BLD AUTO: 41.8 % (ref 40–54)
HGB BLD-MCNC: 13.2 G/DL (ref 14–18)
IMM GRANULOCYTES # BLD AUTO: 0.01 K/UL (ref 0–0.04)
IMM GRANULOCYTES NFR BLD AUTO: 0.2 % (ref 0–0.5)
LACTATE SERPL-SCNC: 2.3 MMOL/L (ref 0.5–2.2)
LDH SERPL L TO P-CCNC: 196 U/L (ref 110–260)
LYMPHOCYTES # BLD AUTO: 2.1 K/UL (ref 1–4.8)
LYMPHOCYTES NFR BLD: 38.3 % (ref 18–48)
MCH RBC QN AUTO: 27.2 PG (ref 27–31)
MCHC RBC AUTO-ENTMCNC: 31.6 G/DL (ref 32–36)
MCV RBC AUTO: 86 FL (ref 82–98)
MONOCYTES # BLD AUTO: 0.5 K/UL (ref 0.3–1)
MONOCYTES NFR BLD: 9.5 % (ref 4–15)
NEUTROPHILS # BLD AUTO: 2.5 K/UL (ref 1.8–7.7)
NEUTROPHILS NFR BLD: 46.5 % (ref 38–73)
NRBC BLD-RTO: 0 /100 WBC
PLATELET # BLD AUTO: 146 K/UL (ref 150–350)
PMV BLD AUTO: 10.1 FL (ref 9.2–12.9)
POTASSIUM SERPL-SCNC: 4.3 MMOL/L (ref 3.5–5.1)
PROCALCITONIN SERPL IA-MCNC: 0.07 NG/ML
PROT SERPL-MCNC: 7.6 G/DL (ref 6–8.4)
RBC # BLD AUTO: 4.85 M/UL (ref 4.6–6.2)
SARS-COV-2 RNA AMPLIFICATION, QUAL: NEGATIVE
SODIUM SERPL-SCNC: 138 MMOL/L (ref 136–145)
TROPONIN I SERPL DL<=0.01 NG/ML-MCNC: 0.02 NG/ML (ref 0–0.03)
WBC # BLD AUTO: 5.45 K/UL (ref 3.9–12.7)

## 2020-04-03 PROCEDURE — 25000242 PHARM REV CODE 250 ALT 637 W/ HCPCS: Performed by: PHYSICIAN ASSISTANT

## 2020-04-03 PROCEDURE — 80053 COMPREHEN METABOLIC PANEL: CPT

## 2020-04-03 PROCEDURE — 96374 THER/PROPH/DIAG INJ IV PUSH: CPT

## 2020-04-03 PROCEDURE — 87040 BLOOD CULTURE FOR BACTERIA: CPT

## 2020-04-03 PROCEDURE — 27100098 HC SPACER

## 2020-04-03 PROCEDURE — 83605 ASSAY OF LACTIC ACID: CPT

## 2020-04-03 PROCEDURE — 94761 N-INVAS EAR/PLS OXIMETRY MLT: CPT

## 2020-04-03 PROCEDURE — 11000001 HC ACUTE MED/SURG PRIVATE ROOM

## 2020-04-03 PROCEDURE — U0002 COVID-19 LAB TEST NON-CDC: HCPCS

## 2020-04-03 PROCEDURE — 99285 PR EMERGENCY DEPT VISIT,LEVEL V: ICD-10-PCS | Mod: ,,, | Performed by: PHYSICIAN ASSISTANT

## 2020-04-03 PROCEDURE — 83615 LACTATE (LD) (LDH) ENZYME: CPT

## 2020-04-03 PROCEDURE — 85025 COMPLETE CBC W/AUTO DIFF WBC: CPT

## 2020-04-03 PROCEDURE — 83880 ASSAY OF NATRIURETIC PEPTIDE: CPT

## 2020-04-03 PROCEDURE — 25000003 PHARM REV CODE 250: Performed by: STUDENT IN AN ORGANIZED HEALTH CARE EDUCATION/TRAINING PROGRAM

## 2020-04-03 PROCEDURE — 63600175 PHARM REV CODE 636 W HCPCS: Performed by: PHYSICIAN ASSISTANT

## 2020-04-03 PROCEDURE — 84145 PROCALCITONIN (PCT): CPT

## 2020-04-03 PROCEDURE — 25000242 PHARM REV CODE 250 ALT 637 W/ HCPCS: Performed by: STUDENT IN AN ORGANIZED HEALTH CARE EDUCATION/TRAINING PROGRAM

## 2020-04-03 PROCEDURE — 99285 EMERGENCY DEPT VISIT HI MDM: CPT | Mod: 25

## 2020-04-03 PROCEDURE — 93010 EKG 12-LEAD: ICD-10-PCS | Mod: ,,, | Performed by: INTERNAL MEDICINE

## 2020-04-03 PROCEDURE — 94640 AIRWAY INHALATION TREATMENT: CPT

## 2020-04-03 PROCEDURE — 86140 C-REACTIVE PROTEIN: CPT

## 2020-04-03 PROCEDURE — 82728 ASSAY OF FERRITIN: CPT

## 2020-04-03 PROCEDURE — 63600175 PHARM REV CODE 636 W HCPCS: Performed by: STUDENT IN AN ORGANIZED HEALTH CARE EDUCATION/TRAINING PROGRAM

## 2020-04-03 PROCEDURE — 82550 ASSAY OF CK (CPK): CPT

## 2020-04-03 PROCEDURE — 84484 ASSAY OF TROPONIN QUANT: CPT

## 2020-04-03 PROCEDURE — 93005 ELECTROCARDIOGRAM TRACING: CPT

## 2020-04-03 PROCEDURE — 27000221 HC OXYGEN, UP TO 24 HOURS

## 2020-04-03 PROCEDURE — 93010 ELECTROCARDIOGRAM REPORT: CPT | Mod: ,,, | Performed by: INTERNAL MEDICINE

## 2020-04-03 PROCEDURE — 99285 EMERGENCY DEPT VISIT HI MDM: CPT | Mod: ,,, | Performed by: PHYSICIAN ASSISTANT

## 2020-04-03 RX ORDER — SODIUM CHLORIDE 0.9 % (FLUSH) 0.9 %
10 SYRINGE (ML) INJECTION
Status: DISCONTINUED | OUTPATIENT
Start: 2020-04-03 | End: 2020-04-04 | Stop reason: HOSPADM

## 2020-04-03 RX ORDER — IBUPROFEN 200 MG
1 TABLET ORAL DAILY PRN
Status: DISCONTINUED | OUTPATIENT
Start: 2020-04-03 | End: 2020-04-04 | Stop reason: HOSPADM

## 2020-04-03 RX ORDER — GLUCAGON 1 MG
1 KIT INJECTION
Status: DISCONTINUED | OUTPATIENT
Start: 2020-04-03 | End: 2020-04-04 | Stop reason: HOSPADM

## 2020-04-03 RX ORDER — PANTOPRAZOLE SODIUM 40 MG/1
40 TABLET, DELAYED RELEASE ORAL DAILY
Status: DISCONTINUED | OUTPATIENT
Start: 2020-04-04 | End: 2020-04-04 | Stop reason: HOSPADM

## 2020-04-03 RX ORDER — ONDANSETRON 8 MG/1
8 TABLET, ORALLY DISINTEGRATING ORAL EVERY 8 HOURS PRN
Status: DISCONTINUED | OUTPATIENT
Start: 2020-04-03 | End: 2020-04-04 | Stop reason: HOSPADM

## 2020-04-03 RX ORDER — FLUTICASONE FUROATE AND VILANTEROL 100; 25 UG/1; UG/1
1 POWDER RESPIRATORY (INHALATION) DAILY
Status: DISCONTINUED | OUTPATIENT
Start: 2020-04-04 | End: 2020-04-04 | Stop reason: HOSPADM

## 2020-04-03 RX ORDER — TIOTROPIUM BROMIDE 18 UG/1
18 CAPSULE ORAL; RESPIRATORY (INHALATION) DAILY
Status: DISCONTINUED | OUTPATIENT
Start: 2020-04-04 | End: 2020-04-04 | Stop reason: HOSPADM

## 2020-04-03 RX ORDER — ISOSORBIDE DINITRATE AND HYDRALAZINE HYDROCHLORIDE 37.5; 2 MG/1; MG/1
1 TABLET ORAL 2 TIMES DAILY
Status: DISCONTINUED | OUTPATIENT
Start: 2020-04-03 | End: 2020-04-04 | Stop reason: HOSPADM

## 2020-04-03 RX ORDER — ALBUTEROL SULFATE 90 UG/1
2 AEROSOL, METERED RESPIRATORY (INHALATION)
Status: COMPLETED | OUTPATIENT
Start: 2020-04-03 | End: 2020-04-03

## 2020-04-03 RX ORDER — IBUPROFEN 200 MG
16 TABLET ORAL
Status: DISCONTINUED | OUTPATIENT
Start: 2020-04-03 | End: 2020-04-04 | Stop reason: HOSPADM

## 2020-04-03 RX ORDER — INSULIN ASPART 100 [IU]/ML
0-5 INJECTION, SOLUTION INTRAVENOUS; SUBCUTANEOUS
Status: DISCONTINUED | OUTPATIENT
Start: 2020-04-03 | End: 2020-04-04 | Stop reason: HOSPADM

## 2020-04-03 RX ORDER — IBUPROFEN 200 MG
24 TABLET ORAL
Status: DISCONTINUED | OUTPATIENT
Start: 2020-04-03 | End: 2020-04-04 | Stop reason: HOSPADM

## 2020-04-03 RX ORDER — SENNOSIDES 8.6 MG/1
1 TABLET ORAL DAILY PRN
Status: DISCONTINUED | OUTPATIENT
Start: 2020-04-03 | End: 2020-04-04 | Stop reason: HOSPADM

## 2020-04-03 RX ORDER — PREDNISONE 20 MG/1
40 TABLET ORAL DAILY
Status: DISCONTINUED | OUTPATIENT
Start: 2020-04-04 | End: 2020-04-04 | Stop reason: HOSPADM

## 2020-04-03 RX ORDER — METHYLPREDNISOLONE SOD SUCC 125 MG
125 VIAL (EA) INJECTION
Status: COMPLETED | OUTPATIENT
Start: 2020-04-03 | End: 2020-04-03

## 2020-04-03 RX ORDER — IPRATROPIUM BROMIDE AND ALBUTEROL SULFATE 2.5; .5 MG/3ML; MG/3ML
3 SOLUTION RESPIRATORY (INHALATION) EVERY 4 HOURS
Status: DISCONTINUED | OUTPATIENT
Start: 2020-04-03 | End: 2020-04-04

## 2020-04-03 RX ORDER — ENOXAPARIN SODIUM 100 MG/ML
40 INJECTION SUBCUTANEOUS EVERY 24 HOURS
Status: DISCONTINUED | OUTPATIENT
Start: 2020-04-03 | End: 2020-04-04 | Stop reason: HOSPADM

## 2020-04-03 RX ORDER — FUROSEMIDE 10 MG/ML
60 INJECTION INTRAMUSCULAR; INTRAVENOUS ONCE
Status: COMPLETED | OUTPATIENT
Start: 2020-04-03 | End: 2020-04-03

## 2020-04-03 RX ADMIN — HYDRALAZINE HYDROCHLORIDE AND ISOSORBIDE DINITRATE 1 TABLET: 37.5; 2 TABLET, FILM COATED ORAL at 09:04

## 2020-04-03 RX ADMIN — METHYLPREDNISOLONE SODIUM SUCCINATE 125 MG: 125 INJECTION, POWDER, FOR SOLUTION INTRAMUSCULAR; INTRAVENOUS at 05:04

## 2020-04-03 RX ADMIN — FUROSEMIDE 60 MG: 10 INJECTION, SOLUTION INTRAMUSCULAR; INTRAVENOUS at 09:04

## 2020-04-03 RX ADMIN — SACUBITRIL AND VALSARTAN 1 TABLET: 49; 51 TABLET, FILM COATED ORAL at 09:04

## 2020-04-03 RX ADMIN — ALBUTEROL SULFATE 2 PUFF: 90 AEROSOL, METERED RESPIRATORY (INHALATION) at 04:04

## 2020-04-03 RX ADMIN — IPRATROPIUM BROMIDE AND ALBUTEROL SULFATE 3 ML: .5; 3 SOLUTION RESPIRATORY (INHALATION) at 08:04

## 2020-04-03 RX ADMIN — ENOXAPARIN SODIUM 40 MG: 100 INJECTION SUBCUTANEOUS at 09:04

## 2020-04-03 NOTE — ED PROVIDER NOTES
Encounter Date: 4/3/2020       History     Chief Complaint   Patient presents with    Shortness of Breath     59-year-old male with multiple comorbidities including chronic respiratory failure secondary to COPD (2 liters/minute home O2 by nasal cannula only at night), CAD, CHF with EF 10%, CAD, hypertension history of seizures presents for shortness of breath.  He was recently admitted in mid March and states that he never really felt better after his discharge but became considerably more short of breath over the past few days.  Symptoms are worse with lying flat, minimal improvement with Spiriva inhaler.  He reports associated wheezing and cough productive of yellowish sputum.  He had one episode of emesis but denies nausea at present He denies chest pain, fevers/chills, myalgias, abdominal pain, urinary symptoms or diarrhea.  Denies any known sick contacts or contact with people under investigation for COVID-19.  No recent travel.  The patient works as a .        Review of patient's allergies indicates:   Allergen Reactions    Benazepril Swelling    Duoneb [ipratropium-albuterol]      Report Tremors     Past Medical History:   Diagnosis Date    Asthma     COPD (chronic obstructive pulmonary disease)     home O2 at night only    Coronary artery disease     Hypertension     Pneumonia     Seizures      History reviewed. No pertinent surgical history.  Family History   Problem Relation Age of Onset    Cancer Father     Hypertension Sister     Stroke Sister     Stroke Brother     Diabetes Neg Hx     Heart disease Neg Hx      Social History     Tobacco Use    Smoking status: Current Every Day Smoker     Packs/day: 0.25     Types: Cigarettes    Smokeless tobacco: Never Used    Tobacco comment: 1-2 cigs per day   Substance Use Topics    Alcohol use: Yes     Alcohol/week: 2.0 standard drinks     Types: 2 Cans of beer per week     Comment: every night    Drug use: No     Review of Systems    Constitutional: Negative for chills, diaphoresis, fatigue and fever.   HENT: Negative for sore throat.    Respiratory: Positive for cough, shortness of breath and wheezing.    Cardiovascular: Negative for chest pain.   Gastrointestinal: Positive for vomiting. Negative for abdominal pain, diarrhea and nausea.   Genitourinary: Negative for dysuria.   Musculoskeletal: Negative for back pain.   Skin: Negative for rash.   Neurological: Negative for weakness.   Hematological: Does not bruise/bleed easily.       Physical Exam     Initial Vitals [04/03/20 1406]   BP Pulse Resp Temp SpO2   138/72 96 (!) 28 98.6 °F (37 °C) (!) 91 %      MAP       --         Physical Exam    Nursing note and vitals reviewed.  Constitutional: He appears well-developed and well-nourished. He is not diaphoretic. No distress.   HENT:   Head: Normocephalic and atraumatic.   Eyes: EOM are normal. Pupils are equal, round, and reactive to light.   Neck: Normal range of motion. Neck supple. JVD present.   JVD up to the jaw   Cardiovascular: Normal rate, regular rhythm, normal heart sounds and intact distal pulses. Exam reveals no gallop and no friction rub.    No murmur heard.  Pulmonary/Chest: Effort normal. Tachypnea noted. No respiratory distress. He exhibits no mass and no tenderness.   Speaking in full sentences without difficulty   Abdominal: Soft. He exhibits no distension. There is no tenderness.   Musculoskeletal: Normal range of motion.        Right lower leg: Normal. He exhibits no tenderness and no edema.        Left lower leg: Normal. He exhibits no edema.   Neurological: He is alert and oriented to person, place, and time.   Skin: Skin is warm and dry.   Psychiatric: He has a normal mood and affect.         ED Course   Procedures  Labs Reviewed   CBC W/ AUTO DIFFERENTIAL - Abnormal; Notable for the following components:       Result Value    Hemoglobin 13.2 (*)     Mean Corpuscular Hemoglobin Conc 31.6 (*)     RDW 17.4 (*)     Platelets  146 (*)     All other components within normal limits   COMPREHENSIVE METABOLIC PANEL - Abnormal; Notable for the following components:    Calcium 8.4 (*)     Albumin 3.4 (*)     AST 50 (*)     All other components within normal limits   C-REACTIVE PROTEIN - Abnormal; Notable for the following components:    CRP 13.1 (*)     All other components within normal limits   LACTIC ACID, PLASMA - Abnormal; Notable for the following components:    Lactate (Lactic Acid) 2.3 (*)     All other components within normal limits   CULTURE, BLOOD   CULTURE, BLOOD   LACTATE DEHYDROGENASE   CK   TROPONIN I   SARS-COV-2 RNA AMPLIFICATION, QUAL   FERRITIN   B-TYPE NATRIURETIC PEPTIDE     EKG Readings: (Independently Interpreted)   Initial Reading: No STEMI. Previous EKG: Compared with most recent EKG Previous EKG Date: 3/16/2020. Rhythm: Normal Sinus Rhythm. Heart Rate: 80. Ectopy: No Ectopy. ST Segments: Normal ST Segments. T Waves: Normal. Clinical Impression: Normal Sinus Rhythm       Imaging Results          X-Ray Chest AP Portable (In process)  Result time 04/03/20 18:12:09                 Medical Decision Making:   History:   Old Medical Records: I decided to obtain old medical records.  Old Records Summarized: records from previous admission(s).       <> Summary of Records: Patient was discharged on 03/17/2020 after a 2 day admission for acute on chronic respiratory failure.    Initial Assessment:   59-year-old male presenting for shortness of breath.  He is hypoxic with O2 sats down to 86% on room air.  Sats improved to mid upper 90s with supplemental oxygen by nasal cannula.  He is very mildly tachypneic but able speak in full sentences without difficulty.  He does have JVD up to the jaw but no lower extremity swelling.  Afebrile.  Differential Diagnosis:   CHF exacerbation  COPD exacerbation  No clearly infectious symptoms but given his hypoxia COVID-19 is a concern  Bacterial pneumonia  Independently Interpreted Test(s):   I  have ordered and independently interpreted X-rays - see summary below.       <> Summary of X-Ray Reading(s): Lungs hyperinflated, no clear consolidation or pulmonary edema  I have ordered and independently interpreted EKG Reading(s) - see prior notes  Clinical Tests:   Lab Tests: Ordered and Reviewed  Radiological Study: Ordered and Reviewed  Medical Tests: Ordered and Reviewed  ED Management:  Check labs, do chest x-ray, give albuterol, place on oxygen and reassess.    Labs are notable for negative COVID-19 test.  The patient's chest x-ray shows hyperinflation of the lungs but no obvious consolidation or pulmonary edema.  Will give Solu-Medrol for likely COPD exacerbation.  Patient will be admitted to Hospital Medicine for further management.  Patient comfortable with admission.  I discussed this patient with my supervising physician.                   ED Course as of Apr 03 1811 Fri Apr 03, 2020   1715 COVID negative   SARS-CoV-2 RNA, Rapid Amplification, Qual [CC]   1727 Lactic acid mildly elevated   Lactic Acid, Plasma(!) [CC]      ED Course User Index  [CC] Corazon Wilburn PA-C                Clinical Impression:       ICD-10-CM ICD-9-CM   1. Acute on chronic respiratory failure with hypoxia J96.21 518.84     799.02   2. COPD exacerbation J44.1 491.21         Disposition:   Disposition: Admitted  Condition: Fair                        Corazon Wilburn PA-C  04/03/20 1814

## 2020-04-03 NOTE — LETTER
April 4, 2020         [address]  [facility] [address]  Phone: [phone]  Fax: [phone]       Patient: Baltazar Mccray   YOB: 1960  Date of Visit: 04/04/2020    To Whom It May Concern:    Andra Mccray  was at [facility] on 4/03/2020 - 4/04/2020. While hospitalized he was tested for COVID-19, and the testing was negative. If you have any questions or concerns, or if I can be of further assistance, please do not hesitate to contact me.    Sincerely,    Carlo Valle MD

## 2020-04-03 NOTE — LETTER
April 4, 2020         [address]  [facility] [address]  Phone: [phone]  Fax: [phone]       Patient: Baltazar Mccray   YOB: 1960  Date of Visit: 04/04/2020    To Whom It May Concern:    Andra Mccray  was at [facility] on 4/03/2020 - 4/04/2020. He was tested for COVID-19 while hospitalized and the testing was negative. If you have any questions or concerns, or if I can be of further assistance, please do not hesitate to contact me.    Sincerely,    Carlo Valle MD

## 2020-04-03 NOTE — ED TRIAGE NOTES
Patient comes into ER with complaints of SOB for the past couple. Patient states that he thinks this is his COPD. Patient denies fever.

## 2020-04-03 NOTE — ED NOTES
Security reports pt pulled off oxygen, stated I been here 1 hr i'm getting the 'fuck' out of here, security report he ran out of here, charge nurse aware  Attempted to call number on chart and no answer

## 2020-04-04 VITALS
TEMPERATURE: 98 F | RESPIRATION RATE: 16 BRPM | BODY MASS INDEX: 18.49 KG/M2 | SYSTOLIC BLOOD PRESSURE: 145 MMHG | OXYGEN SATURATION: 93 % | WEIGHT: 122 LBS | HEART RATE: 75 BPM | HEIGHT: 68 IN | DIASTOLIC BLOOD PRESSURE: 79 MMHG

## 2020-04-04 LAB
ALBUMIN SERPL BCP-MCNC: 3.6 G/DL (ref 3.5–5.2)
ALP SERPL-CCNC: 121 U/L (ref 55–135)
ALT SERPL W/O P-5'-P-CCNC: 34 U/L (ref 10–44)
ANION GAP SERPL CALC-SCNC: 16 MMOL/L (ref 8–16)
ANISOCYTOSIS BLD QL SMEAR: SLIGHT
ASCENDING AORTA: 3.28 CM
AST SERPL-CCNC: 42 U/L (ref 10–40)
AV INDEX (PROSTH): 0.88
AV MEAN GRADIENT: 4 MMHG
AV PEAK GRADIENT: 6 MMHG
AV VALVE AREA: 2.77 CM2
AV VELOCITY RATIO: 0.75
BASOPHILS # BLD AUTO: 0 K/UL (ref 0–0.2)
BASOPHILS # BLD AUTO: ABNORMAL K/UL (ref 0–0.2)
BASOPHILS NFR BLD: 0 % (ref 0–1.9)
BASOPHILS NFR BLD: 1 % (ref 0–1.9)
BILIRUB SERPL-MCNC: 0.6 MG/DL (ref 0.1–1)
BSA FOR ECHO PROCEDURE: 1.63 M2
BUN SERPL-MCNC: 14 MG/DL (ref 6–20)
CALCIUM SERPL-MCNC: 8.7 MG/DL (ref 8.7–10.5)
CHLORIDE SERPL-SCNC: 92 MMOL/L (ref 95–110)
CO2 SERPL-SCNC: 27 MMOL/L (ref 23–29)
CREAT SERPL-MCNC: 1 MG/DL (ref 0.5–1.4)
CV ECHO LV RWT: 0.38 CM
DIFFERENTIAL METHOD: ABNORMAL
DIFFERENTIAL METHOD: ABNORMAL
DOP CALC AO PEAK VEL: 1.19 M/S
DOP CALC AO VTI: 16.93 CM
DOP CALC LVOT AREA: 3.1 CM2
DOP CALC LVOT DIAMETER: 2 CM
DOP CALC LVOT PEAK VEL: 0.89 M/S
DOP CALC LVOT STROKE VOLUME: 46.88 CM3
DOP CALCLVOT PEAK VEL VTI: 14.93 CM
E WAVE DECELERATION TIME: 377.78 MSEC
E/A RATIO: 0.91
E/E' RATIO: 9.27 M/S
ECHO LV POSTERIOR WALL: 0.76 CM (ref 0.6–1.1)
EOSINOPHIL # BLD AUTO: 0 K/UL (ref 0–0.5)
EOSINOPHIL # BLD AUTO: ABNORMAL K/UL (ref 0–0.5)
EOSINOPHIL NFR BLD: 0 % (ref 0–8)
EOSINOPHIL NFR BLD: 0 % (ref 0–8)
ERYTHROCYTE [DISTWIDTH] IN BLOOD BY AUTOMATED COUNT: 16.3 % (ref 11.5–14.5)
ERYTHROCYTE [DISTWIDTH] IN BLOOD BY AUTOMATED COUNT: 16.5 % (ref 11.5–14.5)
EST. GFR  (AFRICAN AMERICAN): >60 ML/MIN/1.73 M^2
EST. GFR  (NON AFRICAN AMERICAN): >60 ML/MIN/1.73 M^2
FRACTIONAL SHORTENING: 24 % (ref 28–44)
GLUCOSE SERPL-MCNC: 198 MG/DL (ref 70–110)
HCT VFR BLD AUTO: 42.5 % (ref 40–54)
HCT VFR BLD AUTO: 42.8 % (ref 40–54)
HGB BLD-MCNC: 13.8 G/DL (ref 14–18)
HGB BLD-MCNC: 14 G/DL (ref 14–18)
HYPOCHROMIA BLD QL SMEAR: ABNORMAL
IMM GRANULOCYTES # BLD AUTO: 0.01 K/UL (ref 0–0.04)
IMM GRANULOCYTES # BLD AUTO: ABNORMAL K/UL (ref 0–0.04)
IMM GRANULOCYTES NFR BLD AUTO: 0.4 % (ref 0–0.5)
IMM GRANULOCYTES NFR BLD AUTO: ABNORMAL % (ref 0–0.5)
INTERVENTRICULAR SEPTUM: 0.63 CM (ref 0.6–1.1)
LA MAJOR: 3.38 CM
LA MINOR: 3.65 CM
LA WIDTH: 2.65 CM
LACTATE SERPL-SCNC: 1.4 MMOL/L (ref 0.5–2.2)
LEFT ATRIUM SIZE: 2.56 CM
LEFT ATRIUM VOLUME INDEX: 12.2 ML/M2
LEFT ATRIUM VOLUME: 20.24 CM3
LEFT INTERNAL DIMENSION IN SYSTOLE: 3.01 CM (ref 2.1–4)
LEFT VENTRICLE DIASTOLIC VOLUME INDEX: 41.52 ML/M2
LEFT VENTRICLE DIASTOLIC VOLUME: 68.78 ML
LEFT VENTRICLE MASS INDEX: 46 G/M2
LEFT VENTRICLE SYSTOLIC VOLUME INDEX: 21.3 ML/M2
LEFT VENTRICLE SYSTOLIC VOLUME: 35.3 ML
LEFT VENTRICULAR INTERNAL DIMENSION IN DIASTOLE: 3.97 CM (ref 3.5–6)
LEFT VENTRICULAR MASS: 76.66 G
LV LATERAL E/E' RATIO: 8.5 M/S
LV SEPTAL E/E' RATIO: 10.2 M/S
LYMPHOCYTES # BLD AUTO: 0.3 K/UL (ref 1–4.8)
LYMPHOCYTES # BLD AUTO: ABNORMAL K/UL (ref 1–4.8)
LYMPHOCYTES NFR BLD: 13 % (ref 18–48)
LYMPHOCYTES NFR BLD: 15 % (ref 18–48)
MAGNESIUM SERPL-MCNC: 1.7 MG/DL (ref 1.6–2.6)
MCH RBC QN AUTO: 27.4 PG (ref 27–31)
MCH RBC QN AUTO: 27.4 PG (ref 27–31)
MCHC RBC AUTO-ENTMCNC: 32.5 G/DL (ref 32–36)
MCHC RBC AUTO-ENTMCNC: 32.7 G/DL (ref 32–36)
MCV RBC AUTO: 84 FL (ref 82–98)
MCV RBC AUTO: 85 FL (ref 82–98)
MONOCYTES # BLD AUTO: 0.1 K/UL (ref 0.3–1)
MONOCYTES # BLD AUTO: ABNORMAL K/UL (ref 0.3–1)
MONOCYTES NFR BLD: 0 % (ref 4–15)
MONOCYTES NFR BLD: 3.5 % (ref 4–15)
MV PEAK A VEL: 0.56 M/S
MV PEAK E VEL: 0.51 M/S
NEUTROPHILS # BLD AUTO: 1.9 K/UL (ref 1.8–7.7)
NEUTROPHILS NFR BLD: 83.1 % (ref 38–73)
NEUTROPHILS NFR BLD: 84 % (ref 38–73)
NRBC BLD-RTO: 0 /100 WBC
NRBC BLD-RTO: 0 /100 WBC
OVALOCYTES BLD QL SMEAR: ABNORMAL
PHOSPHATE SERPL-MCNC: 3.1 MG/DL (ref 2.7–4.5)
PLATELET # BLD AUTO: 141 K/UL (ref 150–350)
PLATELET # BLD AUTO: 142 K/UL (ref 150–350)
PMV BLD AUTO: 10.3 FL (ref 9.2–12.9)
PMV BLD AUTO: 10.7 FL (ref 9.2–12.9)
POIKILOCYTOSIS BLD QL SMEAR: SLIGHT
POLYCHROMASIA BLD QL SMEAR: ABNORMAL
POTASSIUM SERPL-SCNC: 4.5 MMOL/L (ref 3.5–5.1)
PROT SERPL-MCNC: 8.1 G/DL (ref 6–8.4)
RA MAJOR: 3.06 CM
RA PRESSURE: 3 MMHG
RA WIDTH: 2.38 CM
RBC # BLD AUTO: 5.03 M/UL (ref 4.6–6.2)
RBC # BLD AUTO: 5.11 M/UL (ref 4.6–6.2)
RIGHT VENTRICULAR END-DIASTOLIC DIMENSION: 3.04 CM
SINUS: 2.66 CM
SODIUM SERPL-SCNC: 135 MMOL/L (ref 136–145)
STJ: 3.14 CM
TARGETS BLD QL SMEAR: ABNORMAL
TDI LATERAL: 0.06 M/S
TDI SEPTAL: 0.05 M/S
TDI: 0.06 M/S
TRICUSPID ANNULAR PLANE SYSTOLIC EXCURSION: 1.59 CM
WBC # BLD AUTO: 1.51 K/UL (ref 3.9–12.7)
WBC # BLD AUTO: 2.3 K/UL (ref 3.9–12.7)

## 2020-04-04 PROCEDURE — 99223 1ST HOSP IP/OBS HIGH 75: CPT | Mod: ,,, | Performed by: HOSPITALIST

## 2020-04-04 PROCEDURE — 85025 COMPLETE CBC W/AUTO DIFF WBC: CPT

## 2020-04-04 PROCEDURE — 83605 ASSAY OF LACTIC ACID: CPT

## 2020-04-04 PROCEDURE — 25000003 PHARM REV CODE 250: Performed by: STUDENT IN AN ORGANIZED HEALTH CARE EDUCATION/TRAINING PROGRAM

## 2020-04-04 PROCEDURE — 94640 AIRWAY INHALATION TREATMENT: CPT

## 2020-04-04 PROCEDURE — 85007 BL SMEAR W/DIFF WBC COUNT: CPT

## 2020-04-04 PROCEDURE — 36415 COLL VENOUS BLD VENIPUNCTURE: CPT

## 2020-04-04 PROCEDURE — 99223 PR INITIAL HOSPITAL CARE,LEVL III: ICD-10-PCS | Mod: ,,, | Performed by: HOSPITALIST

## 2020-04-04 PROCEDURE — 25000242 PHARM REV CODE 250 ALT 637 W/ HCPCS: Performed by: STUDENT IN AN ORGANIZED HEALTH CARE EDUCATION/TRAINING PROGRAM

## 2020-04-04 PROCEDURE — 63600175 PHARM REV CODE 636 W HCPCS: Performed by: STUDENT IN AN ORGANIZED HEALTH CARE EDUCATION/TRAINING PROGRAM

## 2020-04-04 PROCEDURE — 83735 ASSAY OF MAGNESIUM: CPT

## 2020-04-04 PROCEDURE — 97165 OT EVAL LOW COMPLEX 30 MIN: CPT

## 2020-04-04 PROCEDURE — 85027 COMPLETE CBC AUTOMATED: CPT

## 2020-04-04 PROCEDURE — 80053 COMPREHEN METABOLIC PANEL: CPT

## 2020-04-04 PROCEDURE — 84100 ASSAY OF PHOSPHORUS: CPT

## 2020-04-04 PROCEDURE — 99900035 HC TECH TIME PER 15 MIN (STAT)

## 2020-04-04 RX ORDER — IPRATROPIUM BROMIDE AND ALBUTEROL SULFATE 2.5; .5 MG/3ML; MG/3ML
3 SOLUTION RESPIRATORY (INHALATION) EVERY 8 HOURS
Status: DISCONTINUED | OUTPATIENT
Start: 2020-04-04 | End: 2020-04-04 | Stop reason: HOSPADM

## 2020-04-04 RX ORDER — PREDNISONE 20 MG/1
40 TABLET ORAL DAILY
Qty: 8 TABLET | Refills: 0 | Status: SHIPPED | OUTPATIENT
Start: 2020-04-05 | End: 2020-04-09

## 2020-04-04 RX ADMIN — TIOTROPIUM BROMIDE 18 MCG: 18 CAPSULE ORAL; RESPIRATORY (INHALATION) at 12:04

## 2020-04-04 RX ADMIN — FLUTICASONE FUROATE AND VILANTEROL TRIFENATATE 1 PUFF: 100; 25 POWDER RESPIRATORY (INHALATION) at 10:04

## 2020-04-04 RX ADMIN — ONDANSETRON 8 MG: 8 TABLET, ORALLY DISINTEGRATING ORAL at 10:04

## 2020-04-04 RX ADMIN — PANTOPRAZOLE SODIUM 40 MG: 40 TABLET, DELAYED RELEASE ORAL at 10:04

## 2020-04-04 RX ADMIN — IPRATROPIUM BROMIDE AND ALBUTEROL SULFATE 3 ML: .5; 3 SOLUTION RESPIRATORY (INHALATION) at 01:04

## 2020-04-04 RX ADMIN — PREDNISONE 40 MG: 20 TABLET ORAL at 10:04

## 2020-04-04 RX ADMIN — IPRATROPIUM BROMIDE AND ALBUTEROL SULFATE 3 ML: .5; 3 SOLUTION RESPIRATORY (INHALATION) at 04:04

## 2020-04-04 RX ADMIN — SACUBITRIL AND VALSARTAN 1 TABLET: 49; 51 TABLET, FILM COATED ORAL at 12:04

## 2020-04-04 RX ADMIN — HYDRALAZINE HYDROCHLORIDE AND ISOSORBIDE DINITRATE 1 TABLET: 37.5; 2 TABLET, FILM COATED ORAL at 10:04

## 2020-04-04 NOTE — SUBJECTIVE & OBJECTIVE
Past Medical History:   Diagnosis Date    Asthma     COPD (chronic obstructive pulmonary disease)     home O2 at night only    Coronary artery disease     Hypertension     Pneumonia     Seizures        History reviewed. No pertinent surgical history.    Review of patient's allergies indicates:   Allergen Reactions    Benazepril Swelling    Duoneb [ipratropium-albuterol]      Report Tremors       No current facility-administered medications on file prior to encounter.      Current Outpatient Medications on File Prior to Encounter   Medication Sig    albuterol (PROVENTIL HFA) 90 mcg/actuation inhaler Inhale 2 puffs into the lungs every 6 (six) hours as needed for Wheezing. Rescue    fluticasone propion-salmeterol 45-21 mcg/dose (ADVAIR HFA) 45-21 mcg/actuation HFAA inhaler Inhale 2 puffs into the lungs every 12 (twelve) hours. Controller    ipratropium-albuterol (COMBIVENT)  mcg/actuation inhaler Inhale 1 puff into the lungs every 6 (six) hours as needed for Wheezing. Rescue    isosorbide-hydrALAZINE 20-37.5 mg (BIDIL) 20-37.5 mg Tab Take 1 tablet by mouth 2 (two) times daily. (Patient taking differently: Take 1 tablet by mouth once daily. )    omeprazole (PRILOSEC) 20 MG capsule Take 1 capsule (20 mg total) by mouth once daily.    sacubitriL-valsartan (ENTRESTO) 49-51 mg per tablet Take 1 tablet by mouth 2 (two) times daily.    senna (SENOKOT) 8.6 mg tablet Take 1 tablet by mouth daily as needed for Constipation.    tiotropium (SPIRIVA) 18 mcg inhalation capsule Inhale 1 capsule (18 mcg total) into the lungs once daily. Controller     Family History     Problem Relation (Age of Onset)    Cancer Father    Hypertension Sister    Stroke Sister, Brother        Tobacco Use    Smoking status: Current Every Day Smoker     Packs/day: 0.25     Types: Cigarettes    Smokeless tobacco: Never Used    Tobacco comment: 1-2 cigs per day   Substance and Sexual Activity    Alcohol use: Yes     Alcohol/week:  2.0 standard drinks     Types: 2 Cans of beer per week     Comment: every night    Drug use: No    Sexual activity: Not Currently     Review of Systems   Constitutional: Positive for unexpected weight change. Negative for chills, diaphoresis, fatigue and fever.   HENT: Negative for congestion.    Eyes: Negative for visual disturbance.   Respiratory: Positive for shortness of breath. Negative for apnea, cough, chest tightness and wheezing.    Cardiovascular: Negative for chest pain, palpitations and leg swelling.   Gastrointestinal: Negative for abdominal distention, abdominal pain, constipation, diarrhea, nausea and vomiting.   Genitourinary: Negative for dysuria.   Musculoskeletal: Negative for myalgias.   Skin: Negative for rash.   Allergic/Immunologic: Negative for immunocompromised state.   Neurological: Negative for seizures, weakness and headaches.   Psychiatric/Behavioral: Negative for confusion.     Objective:     Vital Signs (Most Recent):  Temp: 98.2 °F (36.8 °C) (04/03/20 1547)  Pulse: 75 (04/03/20 1847)  Resp: (!) 28 (04/03/20 1847)  BP: (!) 147/71 (04/03/20 1847)  SpO2: 100 % (04/03/20 1847) Vital Signs (24h Range):  Temp:  [98.2 °F (36.8 °C)-98.6 °F (37 °C)] 98.2 °F (36.8 °C)  Pulse:  [72-96] 75  Resp:  [19-30] 28  SpO2:  [86 %-100 %] 100 %  BP: (138-167)/(71-89) 147/71     Weight: 59 kg (130 lb)  Body mass index is 19.77 kg/m².    Physical Exam   Constitutional: He is oriented to person, place, and time. He appears well-developed. No distress.   HENT:   Head: Normocephalic and atraumatic.   Mouth/Throat: No oropharyngeal exudate.   Eyes: EOM are normal.   Neck: No JVD present.   Cardiovascular: Normal rate, regular rhythm and intact distal pulses.   Pulmonary/Chest: Effort normal and breath sounds normal. No respiratory distress.   Abdominal: Soft. Bowel sounds are normal. He exhibits no distension. There is no tenderness. There is no guarding.   Musculoskeletal: Normal range of motion. He exhibits  no edema or tenderness.   Neurological: He is alert and oriented to person, place, and time.   Skin: Skin is warm and dry. No rash noted. He is not diaphoretic. No erythema.   Psychiatric: He has a normal mood and affect.   Vitals reviewed.        CRANIAL NERVES     CN III, IV, VI   Extraocular motions are normal.        Significant Labs: All pertinent labs within the past 24 hours have been reviewed.    Significant Imaging: I have reviewed all pertinent imaging results/findings within the past 24 hours.

## 2020-04-04 NOTE — ED NOTES
This nurse called receiving nurse DONNY Abarca to see if pt room 378 A is ready. Nurse states to put patient in for transport in 10 minutes as room is being cleaned at this time and will be ready in 10 minutes

## 2020-04-04 NOTE — ASSESSMENT & PLAN NOTE
59M with subjective SOB saturating 100% on 2L NC in ED.     - O2 to tolerance patient should be satting between 88-92%.   - 60 IV Lasix BID  - Elio-nebs Q4H  - Incentive spirometry   - Prednisone 40 mg QD x5 days  - Continue home spiriva and breo elipta  - Rapid COVID Negative  - BNP 28  - TTE ordered

## 2020-04-04 NOTE — DISCHARGE SUMMARY
[facility] Hospital Medicine  Discharge Summary      Patient Name: Baltazar Mccray  MRN: [id number]  Admission Date: 4/3/2020  Hospital Length of Stay: 1 days  Discharge Date and Time:  04/04/2020 1:56 PM  Attending Physician: Payal Ingram MD   Discharging Provider: David Mejía MD  Primary Care Provider: [facility] Medicine Team: [facility] 2 David Mejía MD    HPI:   Mr. Mccray is a 59 year-old male with a past medical history of COPD on 2L QHS home O2, CHF with EF 10%, hypertension, and history of seizures who [place] [facility] [place] with the chief complaint of shortness of breath for 2 weeks. He was recently [place] [facility] [place] 15[place] for acute on chronic respiratory failure and states that he never really felt better after his discharge. He states his SOB has progressively worsened over the past few day and on multiple occassion has woken up at night unable to catch his breath. He finds his symptoms are exacerbated when laying flat, but reports sleeping in a flat bed with one pillow nightly. He denies cough and sputum production. Additionally, he denies sick contacts, headache, fever, chills, chest pain, myalgias, abdominal pain, urinary symptoms, nausea, vomiting, constipation or diarrhea. Patient is a current smoker, but has decreased the number of cigarrettes he smokes per day secondaty to feeling poorly. He endorses 20 lbs of unintentional weight loss over the past few months.      * No surgery found *      Hospital Course:   Admitted to Hospital medicine team 2 for suspected COPD exacerbation. Started on nebs, steroids, and supplemental oxygen. Also given IV lasix x1. The following morning, the patient look significantly improved. Risks and benefits discussed regarding remaining in the hospital and ultimately we decided to continue COPD exacerbation treatment with PO steroids and home as an outpatient with close follow-up with Cardiology and  Pulmonology.     On 4/4/2020, Patient medically stable for discharge. Discharge recommendations and any changes to patient's medication regimen discussed with the patient prior to discharge and included in the patient's discharge packet.      Physical Exam on Day of Discharge:  Vital Signs Reviewed  Gen: NAD  Pulm: CTA-B  Cardiac: RRR  MSK: no edema present  Neuro: AAOx3  Psych: normal behavior       Consults:     No new Assessment & Plan notes have been filed under this hospital service since the last note was generated.  Service: Hospital Medicine    Final Active Diagnoses:    Diagnosis Date Noted POA    PRINCIPAL PROBLEM:  COPD exacerbation [J44.1] 05/14/2017 Yes    Lactic acid increased [E87.2] 04/03/2020 Yes    Acute on chronic respiratory failure with hypoxia [J96.21] 04/03/2020 Yes    HFrEF (heart failure with reduced ejection fraction) [I50.20] 03/16/2020 Yes    Tobacco abuse [Z72.0] 08/12/2018 Yes    Essential hypertension [I10] 05/19/2017 Yes      Problems Resolved During this Admission:       Discharged Condition: stable    Disposition: Home or Self Care    Follow Up:    Patient Instructions:      Ambulatory referral/consult to Pulmonology   Standing Status: Future   Referral Priority: Routine Referral Type: Consultation   Referral Reason: Specialty Services Required   Requested Specialty: Pulmonary Disease   Number of Visits Requested: 1     Ambulatory referral/consult to Cardiology   Standing Status: Future   Referral Priority: Routine Referral Type: Consultation   Referral Reason: Specialty Services Required   Requested Specialty: Cardiology   Number of Visits Requested: 1       Significant Diagnostic Studies: Labs: All labs within the past 24 hours have been reviewed    Pending Diagnostic Studies:     Procedure Component Value Units Date/Time    Echo Color Flow Doppler? Yes [[id number]] Resulted:  04/04/20 1354    Order Status:  Sent Lab Status:  In process Updated:  04/04/20 1118     BSA 1.63 m2       TDI SEPTAL 0.05 m/s      LV LATERAL E/E' RATIO 8.50 m/s      LV SEPTAL E/E' RATIO 10.20 m/s      LA WIDTH 2.65 cm      TDI LATERAL 0.06 m/s      LVIDD 3.97 cm      IVS 0.63 cm      PW 0.76 cm      LVIDS 3.01 cm      FS 24 %      LA volume 20.24 cm3      Sinus 2.66 cm      STJ 3.14 cm      Ascending aorta 3.28 cm      LV mass 76.66 g      LA size 2.56 cm      RVDD 3.04 cm      TAPSE 1.59 cm      Left Ventricle Relative Wall Thickness 0.38 cm      AV mean gradient 4 mmHg      AV valve area 2.77 cm2      AV Velocity Ratio 0.75     AV index (prosthetic) 0.88     E/A ratio 0.91     Mean e' 0.06 m/s      E wave decelartion time 377.78 msec      LVOT diameter 2.00 cm      LVOT area 3.1 cm2      LVOT peak alli 0.89 m/s      LVOT peak VTI 14.93 cm      Ao peak alli 1.19 m/s      Ao VTI 16.93 cm      LVOT stroke volume 46.88 cm3      AV peak gradient 6 mmHg      E/E' ratio 9.27 m/s      MV Peak E Alli 0.51 m/s      MV Peak A Alli 0.56 m/s      LV Systolic Volume 35.30 mL      LV Systolic Volume Index 21.3 mL/m2      LV Diastolic Volume 68.78 mL      LV Diastolic Volume Index 41.52 mL/m2      LA Volume Index 12.2 mL/m2      LV Mass Index 46 g/m2      RA Major Axis 3.06 cm      Left Atrium Minor Axis 3.65 cm      Left Atrium Major Axis 3.38 cm      RA Width 2.38 cm          Medications:  Reconciled Home Medications:      Medication List      START taking these medications    predniSONE 20 MG tablet  Commonly known as:  DELTASONE  Take 2 tablets (40 mg total) by mouth once daily. for 4 days  Start taking on:  April 5, 2020        CHANGE how you take these medications    BiDiL 20-37.5 mg Tab  Generic drug:  isosorbide-hydrALAZINE 20-37.5 mg  Take 1 tablet by mouth 2 (two) times daily.  What changed:  when to take this        CONTINUE taking these medications    albuterol 90 mcg/actuation inhaler  Commonly known as:  PROVENTIL HFA  Inhale 2 puffs into the lungs every 6 (six) hours as needed for Wheezing. Rescue     ENTRESTO 49-51  mg per tablet  Generic drug:  sacubitriL-valsartan  Take 1 tablet by mouth 2 (two) times daily.     fluticasone propion-salmeterol 45-21 mcg/dose 45-21 mcg/actuation Hfaa inhaler  Commonly known as:  ADVAIR HFA  Inhale 2 puffs into the lungs every 12 (twelve) hours. Controller     ipratropium-albuteroL  mcg/actuation inhaler  Commonly known as:  COMBIVENT  Inhale 1 puff into the lungs every 6 (six) hours as needed for Wheezing. Rescue     omeprazole 20 MG capsule  Commonly known as:  PRILOSEC  Take 1 capsule (20 mg total) by mouth once daily.     senna 8.6 mg tablet  Commonly known as:  SENOKOT  Take 1 tablet by mouth daily as needed for Constipation.     tiotropium 18 mcg inhalation capsule  Commonly known as:  SPIRIVA  Inhale 1 capsule (18 mcg total) into the lungs once daily. Controller            Indwelling Lines/Drains at time of discharge:   Lines/Drains/Airways     None                 Time spent on the discharge of patient: 35 minutes  Patient was seen and examined on the date of discharge and determined to be suitable for discharge.         David Mejía MD  Department of Hospital Medicine  [facility]

## 2020-04-04 NOTE — HOSPITAL COURSE
Admitted to Hospital medicine team 2 for suspected COPD exacerbation. Started on nebs, steroids, and supplemental oxygen. Also given IV lasix x1. The following morning, the patient look significantly improved. Risks and benefits discussed regarding remaining in the hospital and ultimately we decided to continue COPD exacerbation treatment with PO steroids and home as an outpatient with close follow-up with Cardiology and Pulmonology.     On 4/4/2020, Patient medically stable for discharge. Discharge recommendations and any changes to patient's medication regimen discussed with the patient prior to discharge and included in the patient's discharge packet.      Physical Exam on Day of Discharge:  Vital Signs Reviewed  Gen: NAD  Pulm: CTA-B  Cardiac: RRR  MSK: no edema present  Neuro: AAOx3  Psych: normal behavior

## 2020-04-04 NOTE — ED NOTES
Pt being transported upstairs to admit room 378 A. No distress upon departure from ED to admit room.

## 2020-04-04 NOTE — NURSING
* AAO x 4  * Cardiac diet patient tolerating well. See chart for % eaten.   * No edema noted.  * Bed at lowest position and locked, call light within reach, non-slip socks on, and side rails up x 2.  Encouraged patient to call for assistance when needed patient visualized patient ambulating into room gait and balance WNL.  * Left FA PIV 20 G (04/3/20) patent, dressing clean dry and intact no signs or symptoms of infection noted.  * Humidified oxygen 2.5 liters via nasal cannula.  Oxygen saturation 94-95 % respirations even and unlabored.   * Patient has no complaints of pain at this time.  * Skin assessment preformed no breakdown noted.  * Standard precautions maintained.  * Patient able to turn self no assistance needed.  * Patient voiding per urinal one unmeasured noted upon arrival. See flow sheet for intake and output totals.

## 2020-04-04 NOTE — HPI
Mr. Mccray is a 59 year-old male with a past medical history of COPD on 2L QHS home O2, CHF with EF 10%, hypertension, and history of seizures who presented to [facility] [place] ED with the chief complaint of shortness of breath for 2 weeks. He was recently admitted at [facility] [place] on March 15th for acute on chronic respiratory failure and states that he never really felt better after his discharge. He states his SOB has progressively worsened over the past few day and on multiple occassion has woken up at night unable to catch his breath. He finds his symptoms are exacerbated when laying flat, but reports sleeping in a flat bed with one pillow nightly. He denies cough and sputum production. Additionally, he denies sick contacts, headache, fever, chills, chest pain, myalgias, abdominal pain, urinary symptoms, nausea, vomiting, constipation or diarrhea. Patient is a current smoker, but has decreased the number of cigarrettes he smokes per day secondaty to feeling poorly. He endorses 20 lbs of unintentional weight loss over the past few months.

## 2020-04-04 NOTE — PLAN OF CARE
04/04/20 1400   Readmission Questionnaire   At the time of your discharge, did someone talk to you about what your health problems were? Yes   At the time of discharge, did someone talk to you about what to watch out for regarding worsening of your health problem? Yes   At the time of discharge, did someone talk to you about what to do if you experienced worsening of your health problem? Yes   At the time of discharge, did someone talk to you about which medication to take when you left the hospital and which ones to stop taking? Yes   At the time of discharge, did someone talk to you about when and where to follow up with a doctor after you left the hospital? Yes   What do you believe caused you to be sick enough to be re-admitted? SOB   How often do you need to have someone help you when you read instructions, pamphlets, or other written material from your doctor or pharmacy? Never   Do you have problems taking your medications as prescribed? No   Do you have any problems affording any of  your prescribed medications? No   Do you have problems obtaining/receiving your medications? No   Lives With alone   Living Arrangements apartment   Does the patient have family/friends to help with healtcare needs after discharge? yes

## 2020-04-04 NOTE — NURSING
Patient arrived from ED via wheelchair by escort. Oxygen 2 liters via nasal cannula in use oxygen saturation 89 % after ambulation without oxygen. Oxygen increased to 2.5 liters via nasal cannula saturation 94-95% respirations even and unlabored at rest. Extension tubing placed for use when ambulating to restroom. Patient oriented to room no questions or concerns at this time.

## 2020-04-04 NOTE — H&P
[facility] Hospital Medicine  History & Physical    Patient Name: Baltazar Mccray  MRN: [id number]  Admission Date: 4/3/2020  Attending Physician: Payal Ingram MD   Primary Care Provider: [facility] Medicine Team: [facility] MED 2 David Mejía MD     Patient information was obtained from patient, past medical records and ER records.     Subjective:     Principal Problem:COPD exacerbation    Chief Complaint:   Chief Complaint   Patient presents with    Shortness of Breath        HPI: Mr. Mccray is a 59 year-old male with a past medical history of COPD on 2L QHS home O2, CHF with EF 10%, hypertension, and history of seizures who presented to [facility] ED with the chief complaint of shortness of breath for 2 weeks. He was recently admitted at [facility] on March 15th for acute on chronic respiratory failure and states that he never really felt better after his discharge. He states his SOB has progressively worsened over the past few day and on multiple occassion has woken up at night unable to catch his breath. He finds his symptoms are exacerbated when laying flat, but reports sleeping in a flat bed with one pillow nightly. He denies cough and sputum production. Additionally, he denies sick contacts, headache, fever, chills, chest pain, myalgias, abdominal pain, urinary symptoms, nausea, vomiting, constipation or diarrhea. Patient is a current smoker, but has decreased the number of cigarrettes he smokes per day secondaty to feeling poorly. He endorses 20 lbs of unintentional weight loss over the past few months.      Past Medical History:   Diagnosis Date    Asthma     COPD (chronic obstructive pulmonary disease)     home O2 at night only    Coronary artery disease     Hypertension     Pneumonia     Seizures        History reviewed. No pertinent surgical history.    Review of patient's allergies indicates:   Allergen Reactions    Benazepril Swelling    Duoneb  [ipratropium-albuterol]      Report Tremors       No current facility-administered medications on file prior to encounter.      Current Outpatient Medications on File Prior to Encounter   Medication Sig    albuterol (PROVENTIL HFA) 90 mcg/actuation inhaler Inhale 2 puffs into the lungs every 6 (six) hours as needed for Wheezing. Rescue    fluticasone propion-salmeterol 45-21 mcg/dose (ADVAIR HFA) 45-21 mcg/actuation HFAA inhaler Inhale 2 puffs into the lungs every 12 (twelve) hours. Controller    ipratropium-albuterol (COMBIVENT)  mcg/actuation inhaler Inhale 1 puff into the lungs every 6 (six) hours as needed for Wheezing. Rescue    isosorbide-hydrALAZINE 20-37.5 mg (BIDIL) 20-37.5 mg Tab Take 1 tablet by mouth 2 (two) times daily. (Patient taking differently: Take 1 tablet by mouth once daily. )    omeprazole (PRILOSEC) 20 MG capsule Take 1 capsule (20 mg total) by mouth once daily.    sacubitriL-valsartan (ENTRESTO) 49-51 mg per tablet Take 1 tablet by mouth 2 (two) times daily.    senna (SENOKOT) 8.6 mg tablet Take 1 tablet by mouth daily as needed for Constipation.    tiotropium (SPIRIVA) 18 mcg inhalation capsule Inhale 1 capsule (18 mcg total) into the lungs once daily. Controller     Family History     Problem Relation (Age of Onset)    Cancer Father    Hypertension Sister    Stroke Sister, Brother        Tobacco Use    Smoking status: Current Every Day Smoker     Packs/day: 0.25     Types: Cigarettes    Smokeless tobacco: Never Used    Tobacco comment: 1-2 cigs per day   Substance and Sexual Activity    Alcohol use: Yes     Alcohol/week: 2.0 standard drinks     Types: 2 Cans of beer per week     Comment: every night    Drug use: No    Sexual activity: Not Currently     Review of Systems   Constitutional: Positive for unexpected weight change. Negative for chills, diaphoresis, fatigue and fever.   HENT: Negative for congestion.    Eyes: Negative for visual disturbance.   Respiratory:  Positive for shortness of breath. Negative for apnea, cough, chest tightness and wheezing.    Cardiovascular: Negative for chest pain, palpitations and leg swelling.   Gastrointestinal: Negative for abdominal distention, abdominal pain, constipation, diarrhea, nausea and vomiting.   Genitourinary: Negative for dysuria.   Musculoskeletal: Negative for myalgias.   Skin: Negative for rash.   Allergic/Immunologic: Negative for immunocompromised state.   Neurological: Negative for seizures, weakness and headaches.   Psychiatric/Behavioral: Negative for confusion.     Objective:     Vital Signs (Most Recent):  Temp: 98.2 °F (36.8 °C) (04/03/20 1547)  Pulse: 75 (04/03/20 1847)  Resp: (!) 28 (04/03/20 1847)  BP: (!) 147/71 (04/03/20 1847)  SpO2: 100 % (04/03/20 1847) Vital Signs (24h Range):  Temp:  [98.2 °F (36.8 °C)-98.6 °F (37 °C)] 98.2 °F (36.8 °C)  Pulse:  [72-96] 75  Resp:  [19-30] 28  SpO2:  [86 %-100 %] 100 %  BP: (138-167)/(71-89) 147/71     Weight: 59 kg (130 lb)  Body mass index is 19.77 kg/m².    Physical Exam   Constitutional: He is oriented to person, place, and time. He appears well-developed. No distress.   HENT:   Head: Normocephalic and atraumatic.   Mouth/Throat: No oropharyngeal exudate.   Eyes: EOM are normal.   Neck: No JVD present.   Cardiovascular: Normal rate, regular rhythm and intact distal pulses.   Pulmonary/Chest: Effort normal and breath sounds normal. No respiratory distress.   Abdominal: Soft. Bowel sounds are normal. He exhibits no distension. There is no tenderness. There is no guarding.   Musculoskeletal: Normal range of motion. He exhibits no edema or tenderness.   Neurological: He is alert and oriented to person, place, and time.   Skin: Skin is warm and dry. No rash noted. He is not diaphoretic. No erythema.   Psychiatric: He has a normal mood and affect.   Vitals reviewed.        CRANIAL NERVES     CN III, IV, VI   Extraocular motions are normal.        Significant Labs: All pertinent  labs within the past 24 hours have been reviewed.    Significant Imaging: I have reviewed all pertinent imaging results/findings within the past 24 hours.    Assessment/Plan:     * COPD exacerbation  59M with subjective SOB saturating 100% on 2L NC in ED.     - O2 to tolerance patient should be satting between 88-92%.   - 60 IV Lasix BID  - Elio-nebs Q4H  - Incentive spirometry   - Prednisone 40 mg QD x5 days  - Continue home spiriva and breo elipta  - Rapid COVID Negative  - BNP 28  - TTE ordered        Lactic acid increased  - Trend Q6      Acute on chronic respiratory failure with hypoxia  - See COPD Exacerbation      HFrEF (heart failure with reduced ejection fraction)  - Continue home Entresto      Tobacco abuse  - Smoking cessation counseling provided  - PRN Nicotine patch ordered      Essential hypertension  - Continue home Entresto        VTE Risk Mitigation (From admission, onward)         Ordered     enoxaparin injection 40 mg  Daily      04/03/20 1916     IP VTE HIGH RISK PATIENT  Once      04/03/20 1916                   David Mejía MD  Department of Hospital Medicine   [facility]

## 2020-04-04 NOTE — PLAN OF CARE
Discharge education given to pt  regarding medications, future appointments, and when to call a healthcare provider. He verbalized understanding. He was weaned down to room air, with sats dropping to 91% after dressing himself. He stated that he only wears oxygen at night at home. I obtained transportation home for him, but pt refused. He is taking the bus home, and said it's a short ride. MD wrote note to return to work, and it was given to him. He is up independently. Escort picked him up and was coordinating med delivery with the pharmacy.

## 2020-04-04 NOTE — PLAN OF CARE
04/04/20 1400   Discharge Assessment   Assessment Type Discharge Planning Assessment   Confirmed/corrected address and phone number on facesheet? Yes   Assessment information obtained from? Patient   Expected Length of Stay (days) 1   Communicated expected length of stay with patient/caregiver yes   Prior to hospitilization cognitive status: Alert/Oriented   Prior to hospitalization functional status: Independent   Current cognitive status: Alert/Oriented   Current Functional Status: Independent   Lives With alone   Able to Return to Prior Arrangements yes   Is patient able to care for self after discharge? Yes   Patient's perception of discharge disposition home or selfcare   Readmission Within the Last 30 Days no previous admission in last 30 days   Patient currently being followed by outpatient case management? No   Patient currently receives any other outside agency services? No   Equipment Currently Used at Home oxygen  (pt use O2 at night)   Do you have any problems affording any of your prescribed medications? No   Is the patient taking medications as prescribed? yes   Does the patient have transportation home? Yes   Transportation Anticipated public transportation   Does the patient receive services at the [facility]? No   Discharge Plan A Home   Discharge Plan B Home Health   DME Needed Upon Discharge  none   Patient/Family in Agreement with Plan yes     Dx: COPD exacerbation  [facility]  Hosp f/u appt: [facility]

## 2020-04-04 NOTE — ED NOTES
Pt given sandwhich and orange juice . Denies further needs at this time. Pt has Incentive spirometry at bedside. Pt states understanding on how to use it. Respiratory Therapist taught patient how to use it.

## 2020-04-04 NOTE — PLAN OF CARE
OT Acute eval complete. Pt is I in adls and functional mobility and does not required acute OT services at this time. Please reconsult if change of status.

## 2020-04-04 NOTE — PT/OT/SLP EVAL
Occupational Therapy   Evaluation and Discharge Note    Name: Baltazar Mccray  MRN: [id number]  Admitting Diagnosis:  COPD exacerbation      Recommendations:     Discharge Recommendations: home  Discharge Equipment Recommendations:  none  Barriers to discharge:  None    Assessment:     Baltazar Mccray is a 59 y.o. male with a medical diagnosis of COPD exacerbation. At this time, patient is functioning at their prior level of function and does not require further acute OT services.     OT Acute eval complete. Pt is I in adls and functional mobility and does not required acute OT services at this time. Please reconsult if change of status.     Plan:     During this hospitalization, patient does not require further acute OT services.  Please re-consult if situation changes.    · Plan of Care Reviewed with: patient    Subjective     Chief Complaint: SOB  Patient/Family Comments/goals: to go home    Occupational Profile:  Living Environment: Pt lives with nephew in 3rd floor apt with no elevator.   Previous level of function: Independent  Equipment Used at home:  none  Assistance upon Discharge: Pt will not have assistance during the day at home. Nephew works during the day, pt. Works nights    Pain/Comfort:  · Pain Rating 1: 0/10    Patients cultural, spiritual, [employer] conflicts given the current situation:      Objective:     Communicated with: RN prior to session.  Patient found sitting EOB with oxygen upon OT entry to room.    General Precautions: Standard,     Orthopedic Precautions:N/A   Braces: N/A     Occupational Performance:    Bed Mobility:    · Not tested - pt found UIC    Functional Mobility/Transfers:  · Patient completed Sit <> Stand Transfer with independence  with  no assistive device   · Patient completed Toilet Transfer Step Transfer technique with independence with  no AD  · Functional Mobility: OT Acute eval complete. Pt is I in adls and functional mobility and does not required acute OT services at this  time. Please reconsult if change of status.     Activities of Daily Living:  · Upper Body Dressing: independence pt able to kathy gown and tshirt  · Lower Body Dressing: independence pt able to kathy pants    Cognitive/Visual Perceptual:  Cognitive/Psychosocial Skills:     -       Oriented to: Person, Place, Time and Situation   -       Safety awareness/insight to disability: intact     Physical Exam:  Upper Extremity Range of Motion:     -       Right Upper Extremity: WFL  -       Left Upper Extremity: WFL  Upper Extremity Strength:    -       Right Upper Extremity: WFL  -       Left Upper Extremity: WFL   Strength:    -       Right Upper Extremity: WFL  -       Left Upper Extremity: WFL    AMPAC 6 Click ADL:  AMPAC Total Score: 24    Treatment & Education:  - OT/POC-  - Importance of mobility to maximize recovery.  - OOB with daylight hours  - safety w/ functional mobility; hand placement to ensure safe transfers to various surfaces in prep for ADLs   - encouraged to ambulate within household environment at least every hour to hour 1/2    Education:    Patient left seated EOB with all lines intact, call button in reach and RN notified    GOALS:   Multidisciplinary Problems     Occupational Therapy Goals     Not on file                History:     Past Medical History:   Diagnosis Date    Asthma     COPD (chronic obstructive pulmonary disease)     home O2 at night only    Coronary artery disease     Hypertension     Pneumonia     Seizures        History reviewed. No pertinent surgical history.    Time Tracking:     OT Date of Treatment: 04/04/20  OT Start Time: 1040  OT Stop Time: 1050  OT Total Time (min): 10 min    Billable Minutes:Evaluation 10    Ladi Emmanuel OT  4/4/2020

## 2020-04-07 ENCOUNTER — PATIENT OUTREACH (OUTPATIENT)
Dept: ADMINISTRATIVE | Facility: CLINIC | Age: 60
End: 2020-04-07

## 2020-04-07 DIAGNOSIS — J44.1 COPD EXACERBATION: Primary | ICD-10-CM

## 2020-04-07 LAB
BACTERIA BLD CULT: NORMAL
BACTERIA BLD CULT: NORMAL

## 2020-04-07 NOTE — PROGRESS NOTES
C3 nurse attempted to contact patient. No answer. The following message was left for the patient to return the call:  Good morning. I am a nurse calling on behalf of [facility] from the [facility].  This is a Transitional Care Call for Baltazar Mccray. When you have a moment please contact us at [phone] or [phone] Monday through Friday, between the hours of 8 am to 4 pm. We look forward to speaking with you. On behalf of [facility] have a nice day.    The patient does not have a scheduled HOSFU appointment within 7-14 days post hospital discharge date 04/04/20. Non-[facility] PCP.

## 2020-04-07 NOTE — PATIENT INSTRUCTIONS

## 2020-04-08 NOTE — PHYSICIAN QUERY
PT Name: Baltazar Mccray  MR #: [id number]     Physician Query Form - Documentation Clarification      CDS: Ryan William RN CCDS               Contact information: [email or web address]   This form is a permanent document in the medical record.     Query Date: April 8, 2020    By submitting this query, we are merely seeking further clarification of documentation. Please utilize your independent clinical judgment when addressing the question(s) below.    The Medical Record reflects the following:    Clinical Findings Location in Medical Record   acute on chronic respiratory failure with hypoxia  chronic hypoxemic respiratory failure due to COPD and chronic systolic heart failure  COPD exacerbation  on 2L QHS home O2  shortness of breath  Pulmonary/Chest: Effort normal and breath sounds normal. No respiratory distress.  saturating 100% on 2L NC in ED.  O2 to tolerance patient should be satting between 88-92%   4/3/2020 H&P David Mejía MD/Payal Ingram MD   Hypoxia, Breathing comfortably. No respiratory distress. Speaking in full sentences without difficulty  4/3/2020 ED provider notes Corazon Wilburn PA-C/Niki Vega MD   86% O2 sat on RA, RR 28, Supplemental O2 @ 2-2.5L 4/3/2020 Vitals   Mildly coarse interstitial attenuation, nonspecific, no large focal consolidation. 4/3/2020 Chest x-ray                                                                             Please clarify the diagnosis of Acute on chronic respiratory failure with hypoxia      Provider Use Only  [   ] Acute on chronic respiratory failure with hypoxia ruled in and additional clinical support/ decision making indicators for the diagnosis include (specify):_________________________________    [   ]  Acute on chronic respiratory failure with hypoxia has been ruled out    [   ]  Acute on chronic respiratory failure with hypoxia has been ruled out, other diagnosis ruled in:          [  X ]  Chronic Respiratory Failure with  hypoxia          [   ]  Other, (specify):___________    [  ] Other clarification (specify): ______________    [  ] Clinically undetermined               Present on admission (POA) status:   [   ] Yes (Y)                          [  ] Clinically Undetermined (W)  [   ] No (N)                            [   ] Documentation insufficient to determine if condition is POA (U)

## 2020-04-15 ENCOUNTER — CARE AT HOME (OUTPATIENT)
Dept: HOME HEALTH SERVICES | Facility: CLINIC | Age: 60
End: 2020-04-15
Payer: MEDICAID

## 2020-04-15 VITALS
HEART RATE: 78 BPM | OXYGEN SATURATION: 95 % | WEIGHT: 122 LBS | BODY MASS INDEX: 18.49 KG/M2 | TEMPERATURE: 99 F | SYSTOLIC BLOOD PRESSURE: 129 MMHG | RESPIRATION RATE: 18 BRPM | DIASTOLIC BLOOD PRESSURE: 85 MMHG | HEIGHT: 68 IN

## 2020-04-15 DIAGNOSIS — I50.22 CHRONIC SYSTOLIC HEART FAILURE: Primary | ICD-10-CM

## 2020-04-15 DIAGNOSIS — J44.1 COPD EXACERBATION: ICD-10-CM

## 2020-04-15 PROCEDURE — 99495 TCM SERVICES (MODERATE COMPLEXITY): ICD-10-PCS | Mod: S$GLB,,, | Performed by: NURSE PRACTITIONER

## 2020-04-15 PROCEDURE — 99497 ADVNCD CARE PLAN 30 MIN: CPT | Mod: 25,S$GLB,, | Performed by: NURSE PRACTITIONER

## 2020-04-15 PROCEDURE — 99497 PR ADVNCD CARE PLAN 30 MIN: ICD-10-PCS | Mod: 25,S$GLB,, | Performed by: NURSE PRACTITIONER

## 2020-04-15 PROCEDURE — 99495 TRANSJ CARE MGMT MOD F2F 14D: CPT | Mod: S$GLB,,, | Performed by: NURSE PRACTITIONER

## 2020-04-15 RX ORDER — TIOTROPIUM BROMIDE 18 UG/1
18 CAPSULE ORAL; RESPIRATORY (INHALATION) DAILY
Qty: 30 CAPSULE | Refills: 11 | Status: ON HOLD | OUTPATIENT
Start: 2020-04-15 | End: 2020-04-23 | Stop reason: SDUPTHER

## 2020-04-15 NOTE — PROGRESS NOTES
[facility]  Transition of Care Home Visit    Visit Date: 4/15/2020  Encounter Provider: Susan Rae, NP  PCP:  [facility]    PRESENTING HISTORY      Patient ID: Baltazar Mccray is a 59 y.o. male.    Consult Requested By:  Dr. Payal Ingram  Reason for Consult:  TCC/Recent hospitalization    Baltazar is being seen at home due to  transportation limitations, physical debility presents taxing effort to leave the home and to mitigate high risk of hospital readmission.      Chief Complaint: COPD      History of Present Illness: Mr. Baltazar Mccray is a 59 y.o. male who was recently admitted to [facility].    Admission Date: 4/3/2020  Hospital Length of Stay: 1 days  Discharge Date and Time:  04/04/2020     Hospital Course:   Admitted to Hospital medicine team 2 for suspected COPD exacerbation. Started on nebs, steroids, and supplemental oxygen. Also given IV lasix x1. The following morning, the patient look significantly improved. Risks and benefits discussed regarding remaining in the hospital and ultimately we decided to continue COPD exacerbation treatment with PO steroids and home as an outpatient with close follow-up with Cardiology and Pulmonology.      On 4/4/2020, Patient medically stable for discharge. Discharge recommendations and any changes to patient's medication regimen discussed with the patient prior to discharge and included in the patient's discharge packet.       ___________________________________________________________________    Today: Baltazar reports he feels much better since hospital discharge. 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 He does continue to smoke 1/2 PPD cigarettes, oxygen precautions reinforced and he reports he does not wear oxygen when smoking. He reports his inhaler is low, I will send to pharmacy today.  All hospital discharge orders reviewed and  being followed, all medications reconciled and reviewed, patient and family verbalized understanding. No other needs identified at this time.     I initiated the process of advance care planning today and explained the importance of this process to the patient and family.  I introduced the concept of advance directives to the patient, as well. Then the patient received detailed information about the importance of designating a Health Care Power of  (HCPOA). She was also instructed to communicate with this person about their wishes for future healthcare, should she/he become sick and lose decision-making capacity. After our discussion, the patient has reports his sister Viry usually takes care of his medical needs but reports no paperwork has been completed.    Attestation: Screening criteria to assess the level of the patient's risk for infection with COVID-19 as recommended by the CDC at the time of the above documented home visit concluded appropriateness to proceed. Universal precautions were maintained at all times, including provider use of 60% alcohol gel hand  immediately prior to entry and upon departing the patient's home.    HPI:   Mr. Mccray is 59 years old male with a past medical history of COPD on 2L QHS home O2, CHF with EF 10%, hypertension, and history of seizures who [place] [facility] [place] with the chief complaint of shortness of breath for 2 weeks. He was recently [place] [facility] [place] 15th for acute on chronic respiratory failure and states that he never really felt better after his discharge. He states his SOB has progressively worsened over the past few day and on multiple occassion has woken up at night unable to catch his breath. He finds his symptoms are exacerbated when laying flat, but reports sleeping in a flat bed with one pillow nightly. He denies cough and sputum production. Additionally, he denies sick contacts, headache, fever, chills, chest pain, myalgias,  abdominal pain, urinary symptoms, nausea, vomiting, constipation or diarrhea. Patient is a current smoker, but has decreased the number of cigarrettes he smokes per day secondaty to feeling poorly. He endorses 20 lbs of unintentional weight loss over the past few months. Patient drinks couple of beers daily.     On 4/4/2020, Patient medically stable for discharge. Discharge recommendations and any changes to patient's medication regimen discussed with the patient prior to discharge and included in the patient's discharge packet.          Review of Systems   Constitutional: . Negative for chills, diaphoresis and fever.   HENT: Negative for congestion, rhinorrhea and sore throat.    Respiratory: Positive for shortness of breath. Negative for cough and chest tightness.    Cardiovascular: Negative for chest pain, palpitations and leg swelling.   Gastrointestinal:  Negative for abdominal distention, abdominal pain, blood in stool and vomiting.   Endocrine: Negative for polyuria.   Genitourinary: Negative for decreased urine volume, dysuria, flank pain and hematuria.   Skin: Negative for rash.   Allergic/Immunologic: Negative for food allergies and immunocompromised state.   Neurological: Positive for tremors. Negative for dizziness, seizures and light-headedness.   Psychiatric/Behavioral: Negative for confusion.     Assessments:  · Environmental: unkempt, dark not well lit, patient and nephew report high drug activity in this apartment complex, recommended not walking alone up to and from apartment complex  · Functional Status: Independent  · Safety: patient smokes cigarettes, wears oxygen  · Nutritional: appears adequate  · Home Health/DME/Supplies: No Home Health, oxygen concentrator    PAST HISTORY:     Past Medical History:   Diagnosis Date    Asthma     COPD (chronic obstructive pulmonary disease)     home O2 at night only    Coronary artery disease     Hypertension     Pneumonia     Seizures        History  reviewed. No pertinent surgical history.    Family History   Problem Relation Age of Onset    Cancer Father     Hypertension Sister     Stroke Sister     Stroke Brother     Diabetes Neg Hx     Heart disease Neg Hx        Social History     Socioeconomic History    Marital status: Single     Spouse name: Not on file    Number of children: Not on file    Years of education: Not on file    Highest education level: Not on file   Occupational History    Not on file   Social Needs    Financial resource strain: Not on file    Food insecurity:     Worry: Not on file     Inability: Not on file    Transportation needs:     Medical: Not on file     Non-medical: Not on file   Tobacco Use    Smoking status: Current Every Day Smoker     Packs/day: 0.25     Types: Cigarettes    Smokeless tobacco: Never Used    Tobacco comment: 1-2 cigs per day   Substance and Sexual Activity    Alcohol use: Yes     Alcohol/week: 2.0 standard drinks     Types: 2 Cans of beer per week     Comment: every night    Drug use: No    Sexual activity: Not Currently   Lifestyle    Physical activity:     Days per week: Not on file     Minutes per session: Not on file    Stress: Not on file   Relationships    Social connections:     Talks on phone: Not on file     Gets together: Not on file     Attends Je[employer]'s witness service: Not on file     Active member of club or organization: Not on file     Attends meetings of clubs or organizations: Not on file     Relationship status: Not on file   Other Topics Concern    Not on file   Social History Narrative    Patient' nephew is currently living with him in his apartment. Patient's sister Viry ([phone]) helps manage patient's care.       MEDICATIONS & ALLERGIES:     Current Outpatient Medications on File Prior to Visit   Medication Sig Dispense Refill    albuterol (PROVENTIL HFA) 90 mcg/actuation inhaler Inhale 2 puffs into the lungs every 6 (six) hours as needed for Wheezing. Rescue 18 g  11    fluticasone propion-salmeterol 45-21 mcg/dose (ADVAIR HFA) 45-21 mcg/actuation HFAA inhaler Inhale 2 puffs into the lungs every 12 (twelve) hours. Controller      ipratropium-albuterol (COMBIVENT)  mcg/actuation inhaler Inhale 1 puff into the lungs every 6 (six) hours as needed for Wheezing. Rescue 4 g 11    isosorbide-hydrALAZINE 20-37.5 mg (BIDIL) 20-37.5 mg Tab Take 1 tablet by mouth 2 (two) times daily. (Patient taking differently: Take 1 tablet by mouth once daily. ) 60 tablet 11    omeprazole (PRILOSEC) 20 MG capsule Take 1 capsule (20 mg total) by mouth once daily. 30 capsule 1    sacubitriL-valsartan (ENTRESTO) 49-51 mg per tablet Take 1 tablet by mouth 2 (two) times daily. 60 tablet 2    senna (SENOKOT) 8.6 mg tablet Take 1 tablet by mouth daily as needed for Constipation.      [DISCONTINUED] tiotropium (SPIRIVA) 18 mcg inhalation capsule Inhale 1 capsule (18 mcg total) into the lungs once daily. Controller 30 capsule 11     No current facility-administered medications on file prior to visit.         Review of patient's allergies indicates:   Allergen Reactions    Benazepril Swelling    Duoneb [ipratropium-albuterol]      Report Tremors       OBJECTIVE:     Vital Signs:  Vitals:    04/15/20 1145   BP: 129/85   Pulse: 78   Resp: 18   Temp: 98.7 °F (37.1 °C)     Body mass index is 18.55 kg/m².     Physical Exam:  Physical Exam   Constitutional: He is oriented to person, place, and time.   HENT:   Head: Normocephalic and atraumatic.   Mouth/Throat: Abnormal dentition.   Eyes: Pupils are equal, round, and reactive to light. EOM are normal.   Cardiovascular: Normal rate, regular rhythm, normal heart sounds and intact distal pulses. Exam reveals no gallop and no friction rub.   No murmur heard.  Pulmonary/Chest: No respiratory distress. He has decreased breath sounds. No wheezes or rhonci heard  Musculoskeletal: He exhibits no edema or tenderness.   Neurological: He is alert and oriented to  person, place, and time. He displays tremor.      Laboratory  Lab Results   Component Value Date    WBC 2.30 (L) 04/04/2020    HGB 14.0 04/04/2020    HCT 42.8 04/04/2020    MCV 84 04/04/2020     (L) 04/04/2020     Lab Results   Component Value Date    INR 1.0 01/03/2020    INR 1.1 11/30/2019    INR 1.1 05/14/2017     Lab Results   Component Value Date    HGBA1C 5.4 03/15/2020     No results for input(s): POCTGLUCOSE in the last 72 hours.    Diagnostic Results:  TTE 3/16/2020  · Severely decreased left ventricular systolic function. The estimated ejection fraction is 10%.  · Grade I (mild) left ventricular diastolic dysfunction consistent with impaired relaxation.  · Moderate left atrial enlargement.  · Moderately reduced right ventricular systolic function.  · Intermediate central venous pressure (8 mmHg).  · The estimated PA systolic pressure is 22 mmHg.         TRANSITION OF CARE:     [facility] Contact Note: 04/07/2020    Family and/or Caretaker present at visit?  Yes.  Diagnostic tests reviewed/disposition: No diagnosic tests pending after this hospitalization.  Disease/illness education: Oxygen use/smoking cessation/CHF  Home health/community services discussion/referrals: Patient does not have home health established from hospital visit.  They do not need home health.  If needed, we will set up home health for the patient.   Establishment or re-establishment of referral orders for community resources: referral put in for case management for assistance with disability paperwork per sister Viry.   Discussion with other health care providers: No discussion with other health care providers necessary.     Transition of Care Visit:     I have reviewed and updated the history and problem list.  I have reconciled the medication list.  I have discussed the hospitalization and current medical issues, prognosis and plans with the patient/family.  I  spent more than 50% of time discussing the care with the  patient/family.  Total Face-to-Face Encounter: 60 minutes.    Medications Reconciliation:   I have reconciled the patient's home medications and discharge medications with the patient/family. I have updated all changes.  Refer to After-Visit Medication List.    ASSESSMENT & PLAN:     HIGH RISK CONDITION(S): EF 10%, oxygen dependent, history of seizure disorder      Baltazar was seen today for hospital follow up.    Diagnoses and all orders for this visit:    COPD exacerbation  Acute on chronic respiratory failure with hypoxia  59M with subjective SOB on 2L NC at home.      Continue Elio-nebs Q4H  Continue home spiriva and breo elipta  - Rapid COVID Negative         HFrEF (heart failure with reduced ejection fraction)  Continue home Entresto   TTE 4/4/2020    · Normal left ventricular systolic function. The estimated ejection fraction is 58%.  · Septal wall has abnormal motion.  · Local segmental wall motion abnormalities.  · Normal LV diastolic function.  · Low normal right ventricular systolic function.  · Mild mitral sclerosis.  · Normal central venous pressure (3 mmHg).          Tobacco abuse  Smoking cessation counseling provided  PRN Nicotine patch ordered        Essential hypertension  Continue home Entresto    [facility] contact information left with patient today.    Were controlled substances prescribed?  No    Instructions for the patient:  Keep all follow up appointments.  Take all medications as prescribed.  No smoking with oxygen in use.  Oxygen safety and educations.  Fall precautions and safety.  Smoking cessation highly recommended.      Scheduled Follow-up :  No future appointments.    After Visit Medication List :     Medication List           Accurate as of April 15, 2020 11:52 AM. If you have any questions, ask your nurse or doctor.               CHANGE how you take these medications    BiDiL 20-37.5 mg Tab  Generic drug:  isosorbide-hydrALAZINE 20-37.5 mg  Take 1 tablet by mouth 2 (two)  times daily.  What changed:  when to take this        CONTINUE taking these medications    albuterol 90 mcg/actuation inhaler  Commonly known as:  PROVENTIL HFA  Inhale 2 puffs into the lungs every 6 (six) hours as needed for Wheezing. Rescue     ENTRESTO 49-51 mg per tablet  Generic drug:  sacubitriL-valsartan  Take 1 tablet by mouth 2 (two) times daily.     fluticasone propion-salmeterol 45-21 mcg/dose 45-21 mcg/actuation Hfaa inhaler  Commonly known as:  ADVAIR HFA     ipratropium-albuteroL  mcg/actuation inhaler  Commonly known as:  COMBIVENT  Inhale 1 puff into the lungs every 6 (six) hours as needed for Wheezing. Rescue     omeprazole 20 MG capsule  Commonly known as:  PRILOSEC  Take 1 capsule (20 mg total) by mouth once daily.     senna 8.6 mg tablet  Commonly known as:  SENOKOT  Take 1 tablet by mouth daily as needed for Constipation.     tiotropium 18 mcg inhalation capsule  Commonly known as:  SPIRIVA  Inhale 1 capsule (18 mcg total) into the lungs once daily. Controller           Where to Get Your Medications      Information about where to get these medications is not yet available    Ask your nurse or doctor about these medications  · tiotropium 18 mcg inhalation capsule     Consent for visit obtained from patient today.    Signature:  Susan Rae NP     Attestation:   Screening criteria to assess the level of the patient's risk for infection with COVID-19 as recommended by the CDC at the time of the above documented home visit concluded appropriateness to proceed. Universal precautions were maintained at all times, including provider use of 60% alcohol gel hand  immediately prior to entry and upon departure of patient's home as well as cleaning of equipment used in home visit with antibacterial/germicidal disposable wipes.

## 2020-04-19 ENCOUNTER — HOSPITAL ENCOUNTER (INPATIENT)
Facility: HOSPITAL | Age: 60
LOS: 4 days | Discharge: HOME | DRG: 280 | End: 2020-04-23
Attending: EMERGENCY MEDICINE | Admitting: INTERNAL MEDICINE
Payer: MEDICAID

## 2020-04-19 DIAGNOSIS — R07.9 CHEST PAIN: ICD-10-CM

## 2020-04-19 DIAGNOSIS — I25.10 CAD (CORONARY ARTERY DISEASE): ICD-10-CM

## 2020-04-19 DIAGNOSIS — I21.4 NSTEMI (NON-ST ELEVATED MYOCARDIAL INFARCTION): Primary | ICD-10-CM

## 2020-04-19 DIAGNOSIS — R06.02 SHORTNESS OF BREATH: ICD-10-CM

## 2020-04-19 DIAGNOSIS — Z20.822 SUSPECTED COVID-19 VIRUS INFECTION: ICD-10-CM

## 2020-04-19 DIAGNOSIS — J96.11 CHRONIC RESPIRATORY FAILURE WITH HYPOXIA: ICD-10-CM

## 2020-04-19 PROBLEM — I50.22 CHRONIC SYSTOLIC CONGESTIVE HEART FAILURE: Status: ACTIVE | Noted: 2020-04-19

## 2020-04-19 PROBLEM — J44.9 COPD (CHRONIC OBSTRUCTIVE PULMONARY DISEASE): Status: ACTIVE | Noted: 2020-04-19

## 2020-04-19 LAB
ABO + RH BLD: NORMAL
ALBUMIN SERPL BCP-MCNC: 3.4 G/DL (ref 3.5–5.2)
ALLENS TEST: ABNORMAL
ALP SERPL-CCNC: 112 U/L (ref 55–135)
ALT SERPL W/O P-5'-P-CCNC: 53 U/L (ref 10–44)
ANION GAP SERPL CALC-SCNC: 15 MMOL/L (ref 8–16)
APTT BLDCRRT: 88.3 SEC (ref 21–32)
AST SERPL-CCNC: 82 U/L (ref 10–40)
BASOPHILS # BLD AUTO: 0.13 K/UL (ref 0–0.2)
BASOPHILS NFR BLD: 1.7 % (ref 0–1.9)
BILIRUB SERPL-MCNC: 0.3 MG/DL (ref 0.1–1)
BLD GP AB SCN CELLS X3 SERPL QL: NORMAL
BNP SERPL-MCNC: 1132 PG/ML (ref 0–99)
BUN SERPL-MCNC: 8 MG/DL (ref 6–20)
CALCIUM SERPL-MCNC: 8.5 MG/DL (ref 8.7–10.5)
CHLORIDE SERPL-SCNC: 98 MMOL/L (ref 95–110)
CHOLEST SERPL-MCNC: 260 MG/DL (ref 120–199)
CHOLEST/HDLC SERPL: 2.9 {RATIO} (ref 2–5)
CK SERPL-CCNC: 95 U/L (ref 20–200)
CO2 SERPL-SCNC: 24 MMOL/L (ref 23–29)
CREAT SERPL-MCNC: 0.9 MG/DL (ref 0.5–1.4)
CRP SERPL-MCNC: 40.1 MG/L (ref 0–8.2)
DELSYS: ABNORMAL
DIFFERENTIAL METHOD: ABNORMAL
EOSINOPHIL # BLD AUTO: 0.1 K/UL (ref 0–0.5)
EOSINOPHIL NFR BLD: 1.2 % (ref 0–8)
ERYTHROCYTE [DISTWIDTH] IN BLOOD BY AUTOMATED COUNT: 16.1 % (ref 11.5–14.5)
EST. GFR  (AFRICAN AMERICAN): >60 ML/MIN/1.73 M^2
EST. GFR  (NON AFRICAN AMERICAN): >60 ML/MIN/1.73 M^2
FERRITIN SERPL-MCNC: 652 NG/ML (ref 20–300)
FIO2: 25
FLOW: 2
GLUCOSE SERPL-MCNC: 133 MG/DL (ref 70–110)
HCO3 UR-SCNC: 26.2 MMOL/L (ref 24–28)
HCT VFR BLD AUTO: 43.3 % (ref 40–54)
HDLC SERPL-MCNC: 91 MG/DL (ref 40–75)
HDLC SERPL: 35 % (ref 20–50)
HGB BLD-MCNC: 13.8 G/DL (ref 14–18)
IMM GRANULOCYTES # BLD AUTO: 0.01 K/UL (ref 0–0.04)
IMM GRANULOCYTES NFR BLD AUTO: 0.1 % (ref 0–0.5)
INR PPP: 1 (ref 0.8–1.2)
LACTATE SERPL-SCNC: 3.1 MMOL/L (ref 0.5–2.2)
LDH SERPL L TO P-CCNC: 224 U/L (ref 110–260)
LDLC SERPL CALC-MCNC: 140 MG/DL (ref 63–159)
LYMPHOCYTES # BLD AUTO: 2.8 K/UL (ref 1–4.8)
LYMPHOCYTES NFR BLD: 36.5 % (ref 18–48)
MCH RBC QN AUTO: 28 PG (ref 27–31)
MCHC RBC AUTO-ENTMCNC: 31.9 G/DL (ref 32–36)
MCV RBC AUTO: 88 FL (ref 82–98)
MODE: ABNORMAL
MONOCYTES # BLD AUTO: 1 K/UL (ref 0.3–1)
MONOCYTES NFR BLD: 13.1 % (ref 4–15)
NEUTROPHILS # BLD AUTO: 3.7 K/UL (ref 1.8–7.7)
NEUTROPHILS NFR BLD: 47.4 % (ref 38–73)
NONHDLC SERPL-MCNC: 169 MG/DL
NRBC BLD-RTO: 0 /100 WBC
PCO2 BLDA: 44 MMHG (ref 35–45)
PH SMN: 7.38 [PH] (ref 7.35–7.45)
PLATELET # BLD AUTO: 318 K/UL (ref 150–350)
PMV BLD AUTO: 10 FL (ref 9.2–12.9)
PO2 BLDA: 79 MMHG (ref 80–100)
POC BE: 1 MMOL/L
POC SATURATED O2: 95 % (ref 95–100)
POC TCO2: 27 MMOL/L (ref 23–27)
POTASSIUM SERPL-SCNC: 4.3 MMOL/L (ref 3.5–5.1)
PROT SERPL-MCNC: 7.3 G/DL (ref 6–8.4)
PROTHROMBIN TIME: 10.6 SEC (ref 9–12.5)
RBC # BLD AUTO: 4.92 M/UL (ref 4.6–6.2)
SAMPLE: ABNORMAL
SARS-COV-2 RDRP RESP QL NAA+PROBE: NEGATIVE
SITE: ABNORMAL
SODIUM SERPL-SCNC: 137 MMOL/L (ref 136–145)
SP02: 98
T4 FREE SERPL-MCNC: 0.92 NG/DL (ref 0.71–1.51)
TRIGL SERPL-MCNC: 145 MG/DL (ref 30–150)
TROPONIN I SERPL DL<=0.01 NG/ML-MCNC: 0.28 NG/ML (ref 0–0.03)
TROPONIN I SERPL DL<=0.01 NG/ML-MCNC: 0.29 NG/ML (ref 0–0.03)
TSH SERPL DL<=0.005 MIU/L-ACNC: 0.19 UIU/ML (ref 0.4–4)
WBC # BLD AUTO: 7.73 K/UL (ref 3.9–12.7)

## 2020-04-19 PROCEDURE — 36600 WITHDRAWAL OF ARTERIAL BLOOD: CPT

## 2020-04-19 PROCEDURE — U0002 COVID-19 LAB TEST NON-CDC: HCPCS

## 2020-04-19 PROCEDURE — 93010 ELECTROCARDIOGRAM REPORT: CPT | Mod: 76,,, | Performed by: INTERNAL MEDICINE

## 2020-04-19 PROCEDURE — 99291 CRITICAL CARE FIRST HOUR: CPT | Mod: ,,, | Performed by: EMERGENCY MEDICINE

## 2020-04-19 PROCEDURE — 93010 EKG 12-LEAD: ICD-10-PCS | Mod: ,,, | Performed by: INTERNAL MEDICINE

## 2020-04-19 PROCEDURE — 85730 THROMBOPLASTIN TIME PARTIAL: CPT | Mod: 91

## 2020-04-19 PROCEDURE — 83605 ASSAY OF LACTIC ACID: CPT

## 2020-04-19 PROCEDURE — 80061 LIPID PANEL: CPT

## 2020-04-19 PROCEDURE — 83880 ASSAY OF NATRIURETIC PEPTIDE: CPT

## 2020-04-19 PROCEDURE — 25000242 PHARM REV CODE 250 ALT 637 W/ HCPCS: Performed by: STUDENT IN AN ORGANIZED HEALTH CARE EDUCATION/TRAINING PROGRAM

## 2020-04-19 PROCEDURE — 25000003 PHARM REV CODE 250: Performed by: EMERGENCY MEDICINE

## 2020-04-19 PROCEDURE — 27000221 HC OXYGEN, UP TO 24 HOURS

## 2020-04-19 PROCEDURE — 20600001 HC STEP DOWN PRIVATE ROOM

## 2020-04-19 PROCEDURE — 85730 THROMBOPLASTIN TIME PARTIAL: CPT

## 2020-04-19 PROCEDURE — 82550 ASSAY OF CK (CPK): CPT

## 2020-04-19 PROCEDURE — 93005 ELECTROCARDIOGRAM TRACING: CPT

## 2020-04-19 PROCEDURE — 99291 CRITICAL CARE FIRST HOUR: CPT | Mod: 25

## 2020-04-19 PROCEDURE — 96374 THER/PROPH/DIAG INJ IV PUSH: CPT

## 2020-04-19 PROCEDURE — 36415 COLL VENOUS BLD VENIPUNCTURE: CPT

## 2020-04-19 PROCEDURE — 82803 BLOOD GASES ANY COMBINATION: CPT

## 2020-04-19 PROCEDURE — 25000003 PHARM REV CODE 250: Performed by: INTERNAL MEDICINE

## 2020-04-19 PROCEDURE — 85610 PROTHROMBIN TIME: CPT

## 2020-04-19 PROCEDURE — 99223 1ST HOSP IP/OBS HIGH 75: CPT | Mod: ,,, | Performed by: INTERNAL MEDICINE

## 2020-04-19 PROCEDURE — 86140 C-REACTIVE PROTEIN: CPT

## 2020-04-19 PROCEDURE — 99291 PR CRITICAL CARE, E/M 30-74 MINUTES: ICD-10-PCS | Mod: ,,, | Performed by: EMERGENCY MEDICINE

## 2020-04-19 PROCEDURE — 83615 LACTATE (LD) (LDH) ENZYME: CPT

## 2020-04-19 PROCEDURE — 86901 BLOOD TYPING SEROLOGIC RH(D): CPT

## 2020-04-19 PROCEDURE — 84439 ASSAY OF FREE THYROXINE: CPT

## 2020-04-19 PROCEDURE — 85025 COMPLETE CBC W/AUTO DIFF WBC: CPT

## 2020-04-19 PROCEDURE — 80053 COMPREHEN METABOLIC PANEL: CPT

## 2020-04-19 PROCEDURE — 82728 ASSAY OF FERRITIN: CPT

## 2020-04-19 PROCEDURE — 94761 N-INVAS EAR/PLS OXIMETRY MLT: CPT

## 2020-04-19 PROCEDURE — 63600175 PHARM REV CODE 636 W HCPCS: Performed by: INTERNAL MEDICINE

## 2020-04-19 PROCEDURE — 94640 AIRWAY INHALATION TREATMENT: CPT

## 2020-04-19 PROCEDURE — 84443 ASSAY THYROID STIM HORMONE: CPT

## 2020-04-19 PROCEDURE — 84484 ASSAY OF TROPONIN QUANT: CPT

## 2020-04-19 PROCEDURE — 99900035 HC TECH TIME PER 15 MIN (STAT)

## 2020-04-19 PROCEDURE — 63600175 PHARM REV CODE 636 W HCPCS: Performed by: EMERGENCY MEDICINE

## 2020-04-19 PROCEDURE — 99223 PR INITIAL HOSPITAL CARE,LEVL III: ICD-10-PCS | Mod: ,,, | Performed by: INTERNAL MEDICINE

## 2020-04-19 RX ORDER — FUROSEMIDE 10 MG/ML
80 INJECTION INTRAMUSCULAR; INTRAVENOUS
Status: COMPLETED | OUTPATIENT
Start: 2020-04-19 | End: 2020-04-19

## 2020-04-19 RX ORDER — METOPROLOL TARTRATE 25 MG/1
25 TABLET, FILM COATED ORAL 2 TIMES DAILY
Status: DISCONTINUED | OUTPATIENT
Start: 2020-04-19 | End: 2020-04-23 | Stop reason: HOSPADM

## 2020-04-19 RX ORDER — FLUTICASONE FUROATE AND VILANTEROL 200; 25 UG/1; UG/1
1 POWDER RESPIRATORY (INHALATION) DAILY
Status: DISCONTINUED | OUTPATIENT
Start: 2020-04-20 | End: 2020-04-23 | Stop reason: HOSPADM

## 2020-04-19 RX ORDER — ASPIRIN 325 MG
325 TABLET ORAL
Status: COMPLETED | OUTPATIENT
Start: 2020-04-19 | End: 2020-04-19

## 2020-04-19 RX ORDER — ASPIRIN 81 MG/1
81 TABLET ORAL DAILY
Status: DISCONTINUED | OUTPATIENT
Start: 2020-04-20 | End: 2020-04-23 | Stop reason: HOSPADM

## 2020-04-19 RX ORDER — ISOSORBIDE DINITRATE AND HYDRALAZINE HYDROCHLORIDE 37.5; 2 MG/1; MG/1
1 TABLET ORAL 2 TIMES DAILY
Status: DISCONTINUED | OUTPATIENT
Start: 2020-04-19 | End: 2020-04-22

## 2020-04-19 RX ORDER — SODIUM CHLORIDE 0.9 % (FLUSH) 0.9 %
10 SYRINGE (ML) INJECTION
Status: DISCONTINUED | OUTPATIENT
Start: 2020-04-19 | End: 2020-04-23 | Stop reason: HOSPADM

## 2020-04-19 RX ORDER — TIOTROPIUM BROMIDE 18 UG/1
1 CAPSULE ORAL; RESPIRATORY (INHALATION) DAILY
Status: DISCONTINUED | OUTPATIENT
Start: 2020-04-20 | End: 2020-04-20

## 2020-04-19 RX ORDER — NICARDIPINE HYDROCHLORIDE 0.2 MG/ML
INJECTION INTRAVENOUS
Status: DISPENSED
Start: 2020-04-19 | End: 2020-04-20

## 2020-04-19 RX ORDER — HEPARIN SODIUM,PORCINE/D5W 25000/250
12 INTRAVENOUS SOLUTION INTRAVENOUS CONTINUOUS
Status: DISCONTINUED | OUTPATIENT
Start: 2020-04-19 | End: 2020-04-23

## 2020-04-19 RX ORDER — ATORVASTATIN CALCIUM 20 MG/1
40 TABLET, FILM COATED ORAL DAILY
Status: DISCONTINUED | OUTPATIENT
Start: 2020-04-19 | End: 2020-04-23 | Stop reason: HOSPADM

## 2020-04-19 RX ORDER — IPRATROPIUM BROMIDE AND ALBUTEROL SULFATE 2.5; .5 MG/3ML; MG/3ML
3 SOLUTION RESPIRATORY (INHALATION) EVERY 6 HOURS PRN
Status: DISCONTINUED | OUTPATIENT
Start: 2020-04-19 | End: 2020-04-20

## 2020-04-19 RX ORDER — ASPIRIN 325 MG
325 TABLET ORAL ONCE
Status: DISCONTINUED | OUTPATIENT
Start: 2020-04-19 | End: 2020-04-19 | Stop reason: SDUPTHER

## 2020-04-19 RX ADMIN — IPRATROPIUM BROMIDE AND ALBUTEROL SULFATE 3 ML: .5; 3 SOLUTION RESPIRATORY (INHALATION) at 07:04

## 2020-04-19 RX ADMIN — METOPROLOL TARTRATE 25 MG: 25 TABLET, FILM COATED ORAL at 04:04

## 2020-04-19 RX ADMIN — FUROSEMIDE 80 MG: 10 INJECTION, SOLUTION INTRAMUSCULAR; INTRAVENOUS at 12:04

## 2020-04-19 RX ADMIN — HYDRALAZINE HYDROCHLORIDE AND ISOSORBIDE DINITRATE 1 TABLET: 37.5; 2 TABLET, FILM COATED ORAL at 11:04

## 2020-04-19 RX ADMIN — HEPARIN SODIUM AND DEXTROSE 12 UNITS/KG/HR: 10000; 5 INJECTION INTRAVENOUS at 04:04

## 2020-04-19 RX ADMIN — ASPIRIN 325 MG ORAL TABLET 325 MG: 325 PILL ORAL at 01:04

## 2020-04-19 RX ADMIN — TICAGRELOR 180 MG: 90 TABLET ORAL at 04:04

## 2020-04-19 RX ADMIN — ATORVASTATIN CALCIUM 40 MG: 20 TABLET, FILM COATED ORAL at 04:04

## 2020-04-19 NOTE — CONSULTS
[facility]  Interventional Cardiology  Consult Note    Patient Name: Baltazar Mccray  MRN: [id number]  Admission Date: 4/19/2020  Hospital Length of Stay: 0 days  Code Status: Full Code   Attending Provider: Susan Bell MD  Consulting Provider: Shmuel Wilks MD  Primary Care Physician: [facility]  Principal Problem:NSTEMI (non-ST elevated myocardial infarction)    Patient information was obtained from patient and ER records.     Inpatient consult to Interventional Cardiology  Consult performed by: Shmuel Wilks MD  Consult ordered by: Shmuel Wilks MD        Subjective:     Chief Complaint:  Nstemi    HPI:  59 years old  male patient with past medical history of COPD on home oxygen 2 L nightly, asthma, seizure disorder (last seizure 4 years), recently diagnosed HFrEF EF 10% on 3/17/20, treated with GDMT and improved to 58%, no ischemic eval presenting today with sudden onset SOB upon reaching work.  He notes he got on the bus, felt fine, got to work and was suddenly very SOB and nauseous.  Came directly to ER.  No chest pain, vomiting, diarrhea, recent illness.  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    Past Medical History:   Diagnosis Date    Asthma     COPD (chronic obstructive pulmonary disease)     home O2 at night only    Coronary artery disease     Hypertension     Pneumonia     Seizures        History reviewed. No pertinent surgical history.    Review of patient's allergies indicates:   Allergen Reactions    Benazepril Swelling    Duoneb [ipratropium-albuterol]      Report Tremors         (Not in a hospital admission)  Family History     Problem Relation (Age of Onset)    Cancer Father    Hypertension Sister    Stroke Sister, Brother        Tobacco Use    Smoking status: Current Every Day Smoker      Packs/day: 0.25     Types: Cigarettes    Smokeless tobacco: Never Used    Tobacco comment: 1-2 cigs per day   Substance and Sexual Activity    Alcohol use: Yes     Alcohol/week: 2.0 standard drinks     Types: 2 Cans of beer per week     Comment: every night    Drug use: No    Sexual activity: Not Currently     Review of Systems   Constitution: Negative.   HENT: Negative.    Eyes: Negative.    Cardiovascular: Positive for paroxysmal nocturnal dyspnea.   Respiratory: Positive for shortness of breath.    Endocrine: Negative.    Skin: Negative.    Musculoskeletal: Negative.    Gastrointestinal: Negative.    Genitourinary: Negative.    Neurological: Negative.      Objective:     Vital Signs (Most Recent):  Temp: 97.8 °F (36.6 °C) (04/19/20 1054)  Pulse: 90 (04/19/20 1422)  Resp: (!) 26 (04/19/20 1121)  BP: 109/77 (04/19/20 1422)  SpO2: 100 % (04/19/20 1422) Vital Signs (24h Range):  Temp:  [97.8 °F (36.6 °C)] 97.8 °F (36.6 °C)  Pulse:  [] 90  Resp:  [26-29] 26  SpO2:  [79 %-100 %] 100 %  BP: (109-143)/(65-88) 109/77        There is no height or weight on file to calculate BMI.    SpO2: 100 %  O2 Device (Oxygen Therapy): nasal cannula w/ humidification      Intake/Output Summary (Last 24 hours) at 4/19/2020 1508  Last data filed at 4/19/2020 1340  Gross per 24 hour   Intake --   Output 1000 ml   Net -1000 ml       Lines/Drains/Airways     Peripheral Intravenous Line                 Peripheral IV - Single Lumen 04/19/20 1104 18 G Right Forearm less than 1 day                Physical Exam   Constitutional: He is oriented to person, place, and time. He appears well-developed and well-nourished.   HENT:   Head: Normocephalic and atraumatic.   Eyes: Pupils are equal, round, and reactive to light. Conjunctivae and EOM are normal.   Neck: Normal range of motion. Neck supple. No JVD present.   Cardiovascular: Normal rate, regular rhythm and normal heart sounds. Exam reveals no gallop and no friction rub.   No murmur  heard.  Pulmonary/Chest: Effort normal and breath sounds normal. No respiratory distress. He has no wheezes. He has no rales. He exhibits no tenderness.   Abdominal: Soft. Bowel sounds are normal. He exhibits no distension. There is no tenderness.   Musculoskeletal: Normal range of motion. He exhibits no edema or tenderness.   Neurological: He is alert and oriented to person, place, and time.   Skin: Skin is warm and dry. No erythema. No pallor.       Significant Labs:   Recent Lab Results       04/19/20  1229   04/19/20  1123   04/19/20  1109        Albumin     3.4     Alkaline Phosphatase     112     Allens Test   Pass       ALT     53     Anion Gap     15     AST     82     Baso #     0.13     Basophil%     1.7     BILIRUBIN TOTAL     0.3  Comment:  For infants and newborns, interpretation of results should be based  on gestational age, weight and in agreement with clinical  observations.  Premature Infant recommended reference ranges:  Up to 24 hours.............<8.0 mg/dL  Up to 48 hours............<12.0 mg/dL  3-5 days..................<15.0 mg/dL  6-29 days.................<15.0 mg/dL       BNP     1,132  Comment:  Values of less than 100 pg/ml are consistent with non-CHF populations.     Site   RR       BUN, Bld     8     Calcium     8.5     Chloride     98     CO2     24     CPK     95     Creatinine     0.9     CRP     40.1     DelSys   Nasal Can       Differential Method     Automated     eGFR if      >60.0     eGFR if non      >60.0  Comment:  Calculation used to obtain the estimated glomerular filtration  rate (eGFR) is the CKD-EPI equation.        Eos #     0.1     Eosinophil%     1.2     Ferritin     652     FiO2   25       Flow   2       Glucose     133     Gran # (ANC)     3.7     Gran%     47.4     Hematocrit     43.3     Hemoglobin     13.8     Immature Grans (Abs)     0.01  Comment:  Mild elevation in immature granulocytes is non specific and   can be seen in a  variety of conditions including stress response,   acute inflammation, trauma and pregnancy. Correlation with other   laboratory and clinical findings is essential.       Immature Granulocytes     0.1     INR     1.0  Comment:  Coumadin Therapy:  2.0 - 3.0 for INR for all indicators except mechanical heart valves  and antiphospholipid syndromes which should use 2.5 - 3.5.       Lactate, Andrew     3.1  Comment:  Falsely low lactic acid results can be found in samples   containing >=13.0 mg/dL total bilirubin and/or >=3.5 mg/dL   direct bilirubin.         Comment:  Results are increased in hemolyzed samples.         Lymph #     2.8     Lymph%     36.5     MCH     28.0     MCHC     31.9     MCV     88     Mode   SPONT       Mono #     1.0     Mono%     13.1     MPV     10.0     nRBC     0     Platelets     318     POC BE   1       POC HCO3   26.2       POC PCO2   44.0       POC PH   7.382       POC PO2   79       POC SATURATED O2   95       POC TCO2   27       Potassium     4.3     PROTEIN TOTAL     7.3     Protime     10.6     RBC     4.92     RDW     16.1     Sample   ARTERIAL       SARS-CoV-2 RNA, Amplification, Qual     Negative  Comment:  This test utilizes isothermal nucleic acid amplification   technology to detect the SARS-CoV-2 RdRp nucleic acid segment.   The analytical sensitivity (limit of detection) is 125 genome   equivalents/mL.   A POSITIVE result implies infection with the SARS-CoV-2 virus;  the patient is presumed to be contagious.    A NEGATIVE result means that SARS-CoV-2 nucleic acids are not  present above the limit of detection. It does not rule out the   possibility of COVID-19 and should not be the sole basis for   treatment decisions. If COVID-19 is strongly suspected based on  clinical and exposure history, re-testing should be considered.   This test is only for use under the Food and Drug   Administration s Emergency Use Authorization (EUA).   Commercial kits are provided by [employer].   Performance characteristics of the EUA have been independently  verified by [facility] Department of  Pathology and Laboratory Medicine.   _________________________________________________________________  The ID NOW COVID-19 Letter of Authorization, along with the   authorized Fact Sheet for Healthcare Providers, the authorized Fact  Sheet for Patients, and authorized labeling are available on the FDA   website:  www.fda.gov/MedicalDevices/Safety/EmergencySituations/kbo645834.htm       Sodium     137     Sp02   98       Troponin I     0.289  Comment:  The reference interval for Troponin I represents the 99th percentile   cutoff   for our facility and is consistent with 3rd generation assay   performance.       WBC     7.73           Significant Imaging: Echocardiogram:   Transthoracic echo (TTE) complete (Cupid Only):   Results for orders placed or performed during the hospital encounter of 04/03/20   Echo Color Flow Doppler? Yes   Result Value Ref Range    BSA 1.63 m2    TDI SEPTAL 0.05 m/s    LV LATERAL E/E' RATIO 8.50 m/s    LV SEPTAL E/E' RATIO 10.20 m/s    LA WIDTH 2.65 cm    TDI LATERAL 0.06 m/s    LVIDD 3.97 3.5 - 6.0 cm    IVS 0.63 0.6 - 1.1 cm    PW 0.76 0.6 - 1.1 cm    LVIDS 3.01 2.1 - 4.0 cm    FS 24 28 - 44 %    LA volume 20.24 cm3    Sinus 2.66 cm    STJ 3.14 cm    Ascending aorta 3.28 cm    LV mass 76.66 g    LA size 2.56 cm    RVDD 3.04 cm    TAPSE 1.59 cm    Left Ventricle Relative Wall Thickness 0.38 cm    AV mean gradient 4 mmHg    AV valve area 2.77 cm2    AV Velocity Ratio 0.75     AV index (prosthetic) 0.88     E/A ratio 0.91     Mean e' 0.06 m/s    E wave decelartion time 377.78 msec    LVOT diameter 2.00 cm    LVOT area 3.1 cm2    LVOT peak alli 0.89 m/s    LVOT peak VTI 14.93 cm    Ao peak alli 1.19 m/s    Ao VTI 16.93 cm    LVOT stroke volume 46.88 cm3    AV peak gradient 6 mmHg    E/E' ratio 9.27 m/s    MV Peak E Alli 0.51 m/s    MV Peak A Alli 0.56 m/s    LV  Systolic Volume 35.30 mL    LV Systolic Volume Index 21.3 mL/m2    LV Diastolic Volume 68.78 mL    LV Diastolic Volume Index 41.52 mL/m2    LA Volume Index 12.2 mL/m2    LV Mass Index 46 g/m2    RA Major Axis 3.06 cm    Left Atrium Minor Axis 3.65 cm    Left Atrium Major Axis 3.38 cm    RA Width 2.38 cm    Right Atrial Pressure (from IVC) 3 mmHg    Narrative    · Normal left ventricular systolic function. The estimated ejection   fraction is 58%.  · Septal wall has abnormal motion.  · Local segmental wall motion abnormalities.  · Normal LV diastolic function.  · Low normal right ventricular systolic function.  · Mild mitral sclerosis.  · Normal central venous pressure (3 mmHg).        Assessment and Plan:     * NSTEMI (non-ST elevated myocardial infarction)  Check 2d echo  acs protocol  Lopressor 25mg p bid  atorva 40mg po daily  Heparin bolus and gtt  Asa/brilinta load   LHC+/- PCI in AM  -Will plan for LHC +/- PCI, patient is a MARIA R candidate  - Anti-platelet Therapy: asa/brilinta  - Access: r radial  - Creatinine/CrCl: 0.9  - Allergies: ace-I, duoneb  - Pre-Hydration: 3cc/kg/hr  - Pre-Op Med:  benadryl  - All patient's questions were answered.  - The risks, benefits & alternatives of the procedure were explained to the patient.   - The risks of coronary angiography include but are not limited to: Bleeding, infection, heart rhythm abnormalities, allergic reactions, kidney injury requiring dilaysis, limb loss, stroke and death   - Should stenting be indicated, the patient has agreed to dual anti-platelet therapy for 1-consecutive year with a drug-eluting stent and a minimum of 1-month with the use of a bare metal stent  - Additionally, pt is aware that non-compliance is likely to result in stent clotting with heart attack, heart failure, and/or death  - The risks of moderate sedation include hypotension, respiratory depression, arrhythmias, bronchospasm, & death  - Informed consent was obtained & the patient is  agreeable to proceed with the procedure        VTE Risk Mitigation (From admission, onward)         Ordered     heparin 25,000 units in dextrose 5% 250 mL (100 units/mL) infusion LOW INTENSITY nomogram - [facility]  Continuous     Question:  Heparin Infusion Adjustment (DO NOT MODIFY ANSWER)  Answer:  [email or web address]\Images\Pharmacy\HeparinInfusions\heparin LOW INTENSITY nomogram for [facility] [id number]    04/19/20 1506     heparin 25,000 units in dextrose 5% (100 units/ml) IV bolus from bag - ADDITIONAL PRN BOLUS - 60 units/kg (max bolus 4000 units)  As needed (PRN)     Question:  Heparin Infusion Adjustment (DO NOT MODIFY ANSWER)  Answer:  [email or web address]\Images\Pharmacy\HeparinInfusions\heparin LOW INTENSITY nomogram for [facility] [id number]    04/19/20 1506     heparin 25,000 units in dextrose 5% (100 units/ml) IV bolus from bag - ADDITIONAL PRN BOLUS - 30 units/kg (max bolus 4000 units)  As needed (PRN)     Question:  Heparin Infusion Adjustment (DO NOT MODIFY ANSWER)  Answer:  [email or web address]\Images\Pharmacy\HeparinInfusions\heparin LOW INTENSITY nomogram for [facility] [id number]    04/19/20 1506     heparin 25,000 units in dextrose 5% (100 units/ml) IV bolus from bag INITIAL BOLUS (max bolus 4000 units)  Once     Question:  Heparin Infusion Adjustment (DO NOT MODIFY ANSWER)  Answer:  [email or web address]\epic\Images\Pharmacy\HeparinInfusions\heparin LOW INTENSITY nomogram for [facility] [id number]    04/19/20 1506     Reason for no Mechanical VTE Prophylaxis  Once     Question:  Reasons:  Answer:  IV Heparin within 24 hrs. Pre or Post-Op    04/19/20 1503     IP VTE HIGH RISK PATIENT  Once      04/19/20 1503                Thank you for your consult. I will follow-up with patient. Please contact us if you have any additional questions.    Shmuel Wilks MD  Interventional Cardiology   [facility]

## 2020-04-19 NOTE — ASSESSMENT & PLAN NOTE
Elevated Troponin on presentation   ACS  Protocol. Loaded with aspirin and ticagrelor. On lipitor 40 mg daily   Heparin bolus and gtt

## 2020-04-19 NOTE — Clinical Note
35 ml injected throughout the case. 65 mL total wasted during the case. 100 mL total used in the case.

## 2020-04-19 NOTE — HPI
59 years old  male patient with past medical history of COPD on home oxygen 2 L nightly, asthma, seizure disorder (last seizure 4 years), recently diagnosed HFrEF EF 10% on 3/17/20, treated with GDMT and improved to 58%, no ischemic eval presenting today with sudden onset SOB upon reaching work.  He notes he got on the bus, felt fine, got to work and was suddenly very SOB and nauseous.  Came directly to ER.  No chest pain, vomiting, diarrhea, recent illness.  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

## 2020-04-19 NOTE — ED NOTES
Pt resting on stretcher in NAD, respirations even and unlabored. Stretcher locked and in lowest position, side rails x 2, call bell within reach. Cardiac monitor, pulse ox and BP cuff applied cycling Q 30 minute vitals. Oxygen 2 L NC remains in place. Pt offered restroom assistance, pt states no needs at this time, urinal within reach.   Pt updated on wait for cardiology consult.   Will continue to monitor and update pt on plan of care.

## 2020-04-19 NOTE — LETTER
April 23, 2020         [address]  [facility] [id number]  Phone: [phone]  Fax: [phone]       Patient: Baltazar Mccray   YOB: 1960  Date of Visit: 04/23/2020    To Whom It May Concern:    Andra Mccray  was at [facility] on 04/23/2020. He may return to work/school on 4/25/20 with restrictions: he cannot lift more than 5 pounds for the next week and he should not keep his right wrist submerged in water for an extended period. During the patient's hospitalization, he tested negative for COVID three separate times. If you have any questions or concerns, or if I can be of further assistance, please do not hesitate to contact me.    Sincerely,    Debra Kamara PA-C

## 2020-04-19 NOTE — SUBJECTIVE & OBJECTIVE
Past Medical History:   Diagnosis Date    Asthma     COPD (chronic obstructive pulmonary disease)     home O2 at night only    Coronary artery disease     Hypertension     Pneumonia     Seizures        History reviewed. No pertinent surgical history.    Review of patient's allergies indicates:   Allergen Reactions    Benazepril Swelling    Duoneb [ipratropium-albuterol]      Report Tremors         (Not in a hospital admission)  Family History     Problem Relation (Age of Onset)    Cancer Father    Hypertension Sister    Stroke Sister, Brother        Tobacco Use    Smoking status: Current Every Day Smoker     Packs/day: 0.25     Types: Cigarettes    Smokeless tobacco: Never Used    Tobacco comment: 1-2 cigs per day   Substance and Sexual Activity    Alcohol use: Yes     Alcohol/week: 2.0 standard drinks     Types: 2 Cans of beer per week     Comment: every night    Drug use: No    Sexual activity: Not Currently     Review of Systems   Constitution: Negative.   HENT: Negative.    Eyes: Negative.    Cardiovascular: Positive for paroxysmal nocturnal dyspnea.   Respiratory: Positive for shortness of breath.    Endocrine: Negative.    Skin: Negative.    Musculoskeletal: Negative.    Gastrointestinal: Negative.    Genitourinary: Negative.    Neurological: Negative.      Objective:     Vital Signs (Most Recent):  Temp: 97.8 °F (36.6 °C) (04/19/20 1054)  Pulse: 90 (04/19/20 1422)  Resp: (!) 26 (04/19/20 1121)  BP: 109/77 (04/19/20 1422)  SpO2: 100 % (04/19/20 1422) Vital Signs (24h Range):  Temp:  [97.8 °F (36.6 °C)] 97.8 °F (36.6 °C)  Pulse:  [] 90  Resp:  [26-29] 26  SpO2:  [79 %-100 %] 100 %  BP: (109-143)/(65-88) 109/77        There is no height or weight on file to calculate BMI.    SpO2: 100 %  O2 Device (Oxygen Therapy): nasal cannula w/ humidification      Intake/Output Summary (Last 24 hours) at 4/19/2020 1508  Last data filed at 4/19/2020 1340  Gross per 24 hour   Intake --   Output 1000 ml    Net -1000 ml       Lines/Drains/Airways     Peripheral Intravenous Line                 Peripheral IV - Single Lumen 04/19/20 1104 18 G Right Forearm less than 1 day                Physical Exam   Constitutional: He is oriented to person, place, and time. He appears well-developed and well-nourished.   HENT:   Head: Normocephalic and atraumatic.   Eyes: Pupils are equal, round, and reactive to light. Conjunctivae and EOM are normal.   Neck: Normal range of motion. Neck supple. No JVD present.   Cardiovascular: Normal rate, regular rhythm and normal heart sounds. Exam reveals no gallop and no friction rub.   No murmur heard.  Pulmonary/Chest: Effort normal and breath sounds normal. No respiratory distress. He has no wheezes. He has no rales. He exhibits no tenderness.   Abdominal: Soft. Bowel sounds are normal. He exhibits no distension. There is no tenderness.   Musculoskeletal: Normal range of motion. He exhibits no edema or tenderness.   Neurological: He is alert and oriented to person, place, and time.   Skin: Skin is warm and dry. No erythema. No pallor.       Significant Labs:   Recent Lab Results       04/19/20  1229   04/19/20  1123   04/19/20  1109        Albumin     3.4     Alkaline Phosphatase     112     Allens Test   Pass       ALT     53     Anion Gap     15     AST     82     Baso #     0.13     Basophil%     1.7     BILIRUBIN TOTAL     0.3  Comment:  For infants and newborns, interpretation of results should be based  on gestational age, weight and in agreement with clinical  observations.  Premature Infant recommended reference ranges:  Up to 24 hours.............<8.0 mg/dL  Up to 48 hours............<12.0 mg/dL  3-5 days..................<15.0 mg/dL  6-29 days.................<15.0 mg/dL       BNP     1,132  Comment:  Values of less than 100 pg/ml are consistent with non-CHF populations.     Site   RR       BUN, Bld     8     Calcium     8.5     Chloride     98     CO2     24     CPK     95      Creatinine     0.9     CRP     40.1     DelSys   Nasal Can       Differential Method     Automated     eGFR if      >60.0     eGFR if non      >60.0  Comment:  Calculation used to obtain the estimated glomerular filtration  rate (eGFR) is the CKD-EPI equation.        Eos #     0.1     Eosinophil%     1.2     Ferritin     652     FiO2   25       Flow   2       Glucose     133     Gran # (ANC)     3.7     Gran%     47.4     Hematocrit     43.3     Hemoglobin     13.8     Immature Grans (Abs)     0.01  Comment:  Mild elevation in immature granulocytes is non specific and   can be seen in a variety of conditions including stress response,   acute inflammation, trauma and pregnancy. Correlation with other   laboratory and clinical findings is essential.       Immature Granulocytes     0.1     INR     1.0  Comment:  Coumadin Therapy:  2.0 - 3.0 for INR for all indicators except mechanical heart valves  and antiphospholipid syndromes which should use 2.5 - 3.5.       Lactate, Andrew     3.1  Comment:  Falsely low lactic acid results can be found in samples   containing >=13.0 mg/dL total bilirubin and/or >=3.5 mg/dL   direct bilirubin.         Comment:  Results are increased in hemolyzed samples.         Lymph #     2.8     Lymph%     36.5     MCH     28.0     MCHC     31.9     MCV     88     Mode   SPONT       Mono #     1.0     Mono%     13.1     MPV     10.0     nRBC     0     Platelets     318     POC BE   1       POC HCO3   26.2       POC PCO2   44.0       POC PH   7.382       POC PO2   79       POC SATURATED O2   95       POC TCO2   27       Potassium     4.3     PROTEIN TOTAL     7.3     Protime     10.6     RBC     4.92     RDW     16.1     Sample   ARTERIAL       SARS-CoV-2 RNA, Amplification, Qual     Negative  Comment:  This test utilizes isothermal nucleic acid amplification   technology to detect the SARS-CoV-2 RdRp nucleic acid segment.   The analytical sensitivity (limit of  detection) is 125 genome   equivalents/mL.   A POSITIVE result implies infection with the SARS-CoV-2 virus;  the patient is presumed to be contagious.    A NEGATIVE result means that SARS-CoV-2 nucleic acids are not  present above the limit of detection. It does not rule out the   possibility of COVID-19 and should not be the sole basis for   treatment decisions. If COVID-19 is strongly suspected based on  clinical and exposure history, re-testing should be considered.   This test is only for use under the Food and Drug   Administration s Emergency Use Authorization (EUA).   Commercial kits are provided by [employer].   Performance characteristics of the EUA have been independently  verified by [facility] Department of  Pathology and Laboratory Medicine.   _________________________________________________________________  The ID NOW COVID-19 Letter of Authorization, along with the   authorized Fact Sheet for Healthcare Providers, the authorized Fact  Sheet for Patients, and authorized labeling are available on the FDA   website:  www.fda.gov/MedicalDevices/Safety/EmergencySituations/rbl368453.htm       Sodium     137     Sp02   98       Troponin I     0.289  Comment:  The reference interval for Troponin I represents the 99th percentile   cutoff   for our facility and is consistent with 3rd generation assay   performance.       WBC     7.73           Significant Imaging: Echocardiogram:   Transthoracic echo (TTE) complete (Cupid Only):   Results for orders placed or performed during the hospital encounter of 04/03/20   Echo Color Flow Doppler? Yes   Result Value Ref Range    BSA 1.63 m2    TDI SEPTAL 0.05 m/s    LV LATERAL E/E' RATIO 8.50 m/s    LV SEPTAL E/E' RATIO 10.20 m/s    LA WIDTH 2.65 cm    TDI LATERAL 0.06 m/s    LVIDD 3.97 3.5 - 6.0 cm    IVS 0.63 0.6 - 1.1 cm    PW 0.76 0.6 - 1.1 cm    LVIDS 3.01 2.1 - 4.0 cm    FS 24 28 - 44 %    LA volume 20.24 cm3    Sinus 2.66 cm    STJ 3.14 cm     Ascending aorta 3.28 cm    LV mass 76.66 g    LA size 2.56 cm    RVDD 3.04 cm    TAPSE 1.59 cm    Left Ventricle Relative Wall Thickness 0.38 cm    AV mean gradient 4 mmHg    AV valve area 2.77 cm2    AV Velocity Ratio 0.75     AV index (prosthetic) 0.88     E/A ratio 0.91     Mean e' 0.06 m/s    E wave decelartion time 377.78 msec    LVOT diameter 2.00 cm    LVOT area 3.1 cm2    LVOT peak alli 0.89 m/s    LVOT peak VTI 14.93 cm    Ao peak alli 1.19 m/s    Ao VTI 16.93 cm    LVOT stroke volume 46.88 cm3    AV peak gradient 6 mmHg    E/E' ratio 9.27 m/s    MV Peak E Alli 0.51 m/s    MV Peak A Alli 0.56 m/s    LV Systolic Volume 35.30 mL    LV Systolic Volume Index 21.3 mL/m2    LV Diastolic Volume 68.78 mL    LV Diastolic Volume Index 41.52 mL/m2    LA Volume Index 12.2 mL/m2    LV Mass Index 46 g/m2    RA Major Axis 3.06 cm    Left Atrium Minor Axis 3.65 cm    Left Atrium Major Axis 3.38 cm    RA Width 2.38 cm    Right Atrial Pressure (from IVC) 3 mmHg    Narrative    · Normal left ventricular systolic function. The estimated ejection   fraction is 58%.  · Septal wall has abnormal motion.  · Local segmental wall motion abnormalities.  · Normal LV diastolic function.  · Low normal right ventricular systolic function.  · Mild mitral sclerosis.  · Normal central venous pressure (3 mmHg).

## 2020-04-19 NOTE — ASSESSMENT & PLAN NOTE
Check 2d echo  acs protocol  Lopressor 25mg p bid  atorva 40mg po daily  Heparin bolus and gtt  Asa/brilinta load   LHC+/- PCI in AM  -Will plan for LHC +/- PCI, patient is a MARIA R candidate  - Anti-platelet Therapy: asa/brilinta  - Access: r radial  - Creatinine/CrCl: 0.9  - Allergies: ace-I, duoneb  - Pre-Hydration: 3cc/kg/hr  - Pre-Op Med:  benadryl  - All patient's questions were answered.  - The risks, benefits & alternatives of the procedure were explained to the patient.   - The risks of coronary angiography include but are not limited to: Bleeding, infection, heart rhythm abnormalities, allergic reactions, kidney injury requiring dilaysis, limb loss, stroke and death   - Should stenting be indicated, the patient has agreed to dual anti-platelet therapy for 1-consecutive year with a drug-eluting stent and a minimum of 1-month with the use of a bare metal stent  - Additionally, pt is aware that non-compliance is likely to result in stent clotting with heart attack, heart failure, and/or death  - The risks of moderate sedation include hypotension, respiratory depression, arrhythmias, bronchospasm, & death  - Informed consent was obtained & the patient is agreeable to proceed with the procedure

## 2020-04-19 NOTE — ED NOTES
Patient on continuous cardiac monitor, automatic blood pressure cuff and continuous pulse oximeter. VSS. AAOx3. Urinal given to patient. Side rails up and bed in lowest position. Call light in reach. Instructed patient to call staff for assistance with mobility. Will continue to monitor.

## 2020-04-19 NOTE — ED NOTES
Patient in transport to C[facility], lab called for recollect of APTT.  Will reorder and call the nurse on C[facility] to update.

## 2020-04-19 NOTE — ED PROVIDER NOTES
Encounter Date: 4/19/2020       History     Chief Complaint   Patient presents with    Shortness of Breath     59M with PMH COPD, CHF, HTN, seizure d/o presenting from work where he experienced sudden onset of SOB today with an episode of coughing. Patient reports that he was feeling well yesterday. Denies chest pain or pressure, fever/chills, LE edema, wheezing.         Review of patient's allergies indicates:   Allergen Reactions    Benazepril Swelling    Duoneb [ipratropium-albuterol]      Report Tremors     Past Medical History:   Diagnosis Date    Asthma     COPD (chronic obstructive pulmonary disease)     home O2 at night only    Coronary artery disease     Hypertension     Pneumonia     Seizures      History reviewed. No pertinent surgical history.  Family History   Problem Relation Age of Onset    Cancer Father     Hypertension Sister     Stroke Sister     Stroke Brother     Diabetes Neg Hx     Heart disease Neg Hx      Social History     Tobacco Use    Smoking status: Current Every Day Smoker     Packs/day: 0.25     Types: Cigarettes    Smokeless tobacco: Never Used    Tobacco comment: 1-2 cigs per day   Substance Use Topics    Alcohol use: Yes     Alcohol/week: 2.0 standard drinks     Types: 2 Cans of beer per week     Comment: every night    Drug use: No     Review of Systems    Physical Exam     Initial Vitals [04/19/20 1054]   BP Pulse Resp Temp SpO2   (!) 143/87 (!) 130 (!) 29 97.8 °F (36.6 °C) (!) 79 %      MAP       --         Physical Exam    ED Course   Critical Care  Date/Time: 4/19/2020 2:00 PM  Performed by: Susan Bell MD  Authorized by: Susan Bell MD   Direct patient critical care time: 15 minutes  Additional history critical care time: 10 minutes  Ordering / reviewing critical care time: 10 minutes  Documentation critical care time: 10 minutes  Consulting other physicians critical care time: 5 minutes  Total critical care time (exclusive of procedural time)  ": 50 minutes  Critical care time was exclusive of separately billable procedures and treating other patients and teaching time.  Critical care was necessary to treat or prevent imminent or life-threatening deterioration of the following conditions: cardiac failure.  Critical care was time spent personally by me on the following activities: development of treatment plan with patient or surrogate, discussions with consultants, interpretation of cardiac output measurements, evaluation of patient's response to treatment, examination of patient, obtaining history from patient or surrogate, ordering and performing treatments and interventions, ordering and review of laboratory studies, ordering and review of radiographic studies, pulse oximetry, re-evaluation of patient's condition and review of old charts.        Labs Reviewed   CBC W/ AUTO DIFFERENTIAL   TROPONIN I   B-TYPE NATRIURETIC PEPTIDE   PROTIME-INR   COMPREHENSIVE METABOLIC PANEL   C-REACTIVE PROTEIN   FERRITIN   LACTATE DEHYDROGENASE   CK   LACTIC ACID, PLASMA   SARS-COV-2 RNA AMPLIFICATION, QUAL   TROPONIN I          Imaging Results    None          Medical Decision Making:   History:   Old Medical Records: I decided to obtain old medical records.  Old Records Summarized: records from previous admission(s), records from another hospital and records from clinic visits.       <> Summary of Records: 3/15 admission: "Baltazar Mccray was admitted to [facility] on  3/15/2020 for shortness of breath, where he was found to have hypoxemic respiratory failure.  Etiology secondary to likely COPD exacerbation as well as new found heart failure with reduced ejection fraction, where 2D echo performed on admission revealed an EF of 10%.  He was started on GDMT with Entresto and Bidil with noted blood pressure tolerance.  TTE 3/16/20: Severely decreased left ventricular systolic function. The estimated ejection fraction is 10%. Grade I (mild) left ventricular " "diastolic dysfunction consistent with impaired relaxation." Trop elevated to 0.119 at this time.     4/4/20 admission: "Mr. Mccray is a 59 year-old male with a past medical history of COPD on 2L QHS home O2, CHF with EF 10%, hypertension, and history of seizures who presented to [facility] ED with the chief complaint of shortness of breath for 2 weeks. He was recently admitted at [facility] on March 15th for acute on chronic respiratory failure and states that he never really felt better after his discharge. Admitted to Hospital medicine team 2 for suspected COPD exacerbation. Started on nebs, steroids, and supplemental oxygen. Also given IV lasix x1.TTE 4/4/20: Normal left ventricular systolic function. The estimated ejection fraction is 58%. Septal wall has abnormal motion. Local segmental wall motion abnormalities." No trop elevation or EKG changes during this visit.       Initial Assessment:   Patient appears comfortable and talkative, however he is tachypneic, hypoxic to 80% on room air with diffusely diminished breath sounds, but improved to 100% on 2L NC.   Differential Diagnosis:   COPD exacerbation, COVID, PNA, ACS, CHF, electrolyte abnormality, metabolic abnormality, ACS  Independently Interpreted Test(s):   I have ordered and independently interpreted X-rays - see summary below.       <> Summary of X-Ray Reading(s): Chest x-ray shows no consolidations, hyperinflated lungs  I have ordered and independently interpreted EKG Reading(s) - see summary below       <> Summary of EKG Reading(s): Normal sinus rhythm, rate 100, normal intervals aside from prolonged , new compared to prior April 3, 2020.  Also new T-wave inversions in V3 and V4.  ED Management:  Appears more consistent with COPD exacerbation, particularly given patient's history, however EKG shows T-wave inversions in leads V3 and V4, new compared to April 3, 2020. Will avoid Duonebs given concern for cardiac etiology.     ABG shows no hypercapnia, less " likely COPD exacerbation, patient comfortable on 2 L nasal cannula.  Chest XR shows possible consolidation on right side, however BNP significantly elevated to 1100.  Will give Lasix 80 mg, troponin pending.    Lactate 3.3. Troponin elevated to 0.28, pt given   Other:   I have discussed this case with another health care provider.       <> Summary of the Discussion: Discussed TWIs in anterior leads and elevated trop with Cardiology fellow who will evaluate patient.                                  Clinical Impression:       ICD-10-CM ICD-9-CM   1. NSTEMI (non-ST elevated myocardial infarction) I21.4 410.70   2. Shortness of breath R06.02 786.05   3. Suspected Covid-19 Virus Infection R68.89    4. CAD (coronary artery disease) I25.10 414.00   5. Chest pain R07.9 786.50                                Susan Bell MD  04/20/20 2035

## 2020-04-19 NOTE — H&P
[facility]  Cardiology  History and Physical     Patient Name: Baltazar Mccray  MRN: [id number]  Admission Date: 4/19/2020  Code Status: Full Code   Attending Provider: Dr Acevedo   Primary Care Physician: [facility]  Principal Problem:NSTEMI (non-ST elevated myocardial infarction)    Patient information was obtained from patient and ER records.     Subjective:     Chief Complaint:  Sob      HPI:  59 years old  male patient with past medical history of COPD on home oxygen 2 L nightly, asthma, seizure disorder (last seizure 4 years), recently diagnosed HFrEF EF 10% on 3/17/20, treated with GDMT and improved to 58%, no ischemic eval presenting today with sudden onset SOB at rest.  He was waiting for the bus and felt acutely sob for which he presented to ER. Denies any chest pain, nausea,vomiting, Palpitations, fevers. Pre[facility]ius EKG showed TWI in V1,and V2 waves in lateral leads. He never had stress test and cardiac cath in the past     Past Medical History:   Diagnosis Date    Asthma     COPD (chronic obstructive pulmonary disease)     home O2 at night only    Coronary artery disease     Hypertension     Pneumonia     Seizures        History reviewed. No pertinent surgical history.    Review of patient's allergies indicates:   Allergen Reactions    Benazepril Swelling    Duoneb [ipratropium-albuterol]      Report Tremors       No current facility-administered medications on file prior to encounter.      Current Outpatient Medications on File Prior to Encounter   Medication Sig    isosorbide-hydrALAZINE 20-37.5 mg (BIDIL) 20-37.5 mg Tab Take 1 tablet by mouth 2 (two) times daily. (Patient taking differently: Take 1 tablet by mouth once daily. )    albuterol (PROVENTIL HFA) 90 mcg/actuation inhaler Inhale 2 puffs into the lungs every 6 (six) hours as needed for Wheezing. Rescue    fluticasone propion-salmeterol 45-21 mcg/dose (ADVAIR HFA) 45-21 mcg/actuation  HFAA inhaler Inhale 2 puffs into the lungs every 12 (twelve) hours. Controller    ipratropium-albuterol (COMBIVENT)  mcg/actuation inhaler Inhale 1 puff into the lungs every 6 (six) hours as needed for Wheezing. Rescue    omeprazole (PRILOSEC) 20 MG capsule Take 1 capsule (20 mg total) by mouth once daily.    sacubitriL-valsartan (ENTRESTO) 49-51 mg per tablet Take 1 tablet by mouth 2 (two) times daily.    senna (SENOKOT) 8.6 mg tablet Take 1 tablet by mouth daily as needed for Constipation.    tiotropium (SPIRIVA) 18 mcg inhalation capsule Inhale 1 capsule (18 mcg total) into the lungs once daily. Controller     Family History     Problem Relation (Age of Onset)    Cancer Father    Hypertension Sister    Stroke Sister, Brother        Tobacco Use    Smoking status: Current Every Day Smoker     Packs/day: 0.25     Types: Cigarettes    Smokeless tobacco: Never Used    Tobacco comment: 1-2 cigs per day   Substance and Sexual Activity    Alcohol use: Yes     Alcohol/week: 2.0 standard drinks     Types: 2 Cans of beer per week     Comment: every night    Drug use: No    Sexual activity: Not Currently     Review of Systems   Constitution: Negative.   HENT: Negative.    Eyes: Negative.    Cardiovascular: Negative.    Respiratory: Positive for shortness of breath.    Endocrine: Negative.    Hematologic/Lymphatic: Negative.    Musculoskeletal: Negative.    Gastrointestinal: Negative.    Genitourinary: Negative.    Neurological: Negative.      Objective:     Vital Signs (Most Recent):  Temp: 97.8 °F (36.6 °C) (04/19/20 1054)  Pulse: 108 (04/19/20 1623)  Resp: 20 (04/19/20 1602)  BP: (!) 140/81 (04/19/20 1623)  SpO2: 100 % (04/19/20 1625) Vital Signs (24h Range):  Temp:  [97.8 °F (36.6 °C)] 97.8 °F (36.6 °C)  Pulse:  [] 108  Resp:  [20-29] 20  SpO2:  [79 %-100 %] 100 %  BP: (103-143)/(65-88) 140/81     Weight: 55.3 kg (121 lb 14.6 oz)  Body mass index is 18.54 kg/m².    SpO2: 100 %  O2 Device (Oxygen  Therapy): nasal cannula      Intake/Output Summary (Last 24 hours) at 4/19/2020 1641  Last data filed at 4/19/2020 1340  Gross per 24 hour   Intake --   Output 1000 ml   Net -1000 ml       Lines/Drains/Airways     Peripheral Intravenous Line                 Peripheral IV - Single Lumen 04/19/20 1104 18 G Right Forearm less than 1 day                Physical Exam   Constitutional: He is oriented to person, place, and time. He appears well-developed.   HENT:   Head: Normocephalic and atraumatic.   Eyes: Pupils are equal, round, and reactive to light. Conjunctivae are normal.   Neck: Normal range of motion. Neck supple. No JVD present.   Cardiovascular: Normal rate, regular rhythm and normal heart sounds.   Pulmonary/Chest: Effort normal and breath sounds normal.   Mild wheezes present bilaterally    Abdominal: Soft. Bowel sounds are normal.   Musculoskeletal: Normal range of motion.   Neurological: He is alert and oriented to person, place, and time.       Significant Labs:   ABG:   Recent Labs   Lab 04/19/20  1123   PH 7.382   PCO2 44.0   HCO3 26.2   POCSATURATED 95   BE 1   , BMP:   Recent Labs   Lab 04/19/20  1109   *      K 4.3   CL 98   CO2 24   BUN 8   CREATININE 0.9   CALCIUM 8.5*    and CBC   Recent Labs   Lab 04/19/20  1109   WBC 7.73   HGB 13.8*   HCT 43.3          Significant Imaging: X-Ray: CXR: X-Ray Chest 1 View (CXR): No results found for this visit on 04/19/20.    Assessment and Plan:     * NSTEMI (non-ST elevated myocardial infarction)  Elevated Troponin on presentation   ACS  Protocol. Loaded with aspirin and ticagrelor. On lipitor 40 mg daily   Heparin bolus and gtt  Interventional cardiology to cath patient tomorrow       Chronic systolic congestive heart failure  H/o Systolic heart failure. Rpt Echo showed improvement in EF.     COPD (chronic obstructive pulmonary disease)  ABG showed respiratory acidosis. On Spiriva and adaviar. Cont inhalers and Duo nebs as needed         VTE  Risk Mitigation (From admission, onward)         Ordered     heparin 25,000 units in dextrose 5% (100 units/ml) IV bolus from bag - ADDITIONAL PRN BOLUS - 60 units/kg (max bolus 4000 units)  As needed (PRN)     Question:  Heparin Infusion Adjustment (DO NOT MODIFY ANSWER)  Answer:  [email or web address]\epic\Images\Pharmacy\HeparinInfusions\heparin LOW INTENSITY nomogram for [facility] [id number]    04/19/20 1506     heparin 25,000 units in dextrose 5% (100 units/ml) IV bolus from bag - ADDITIONAL PRN BOLUS - 30 units/kg (max bolus 4000 units)  As needed (PRN)     Question:  Heparin Infusion Adjustment (DO NOT MODIFY ANSWER)  Answer:  [email or web address]\epic\Images\Pharmacy\HeparinInfusions\heparin LOW INTENSITY nomogram for [facility] [id number]    04/19/20 1506     heparin 25,000 units in dextrose 5% 250 mL (100 units/mL) infusion LOW INTENSITY nomogram - [facility]  Continuous     Question:  Heparin Infusion Adjustment (DO NOT MODIFY ANSWER)  Answer:  [email or web address]\epic\Images\Pharmacy\HeparinInfusions\heparin LOW INTENSITY nomogram for [facility] [id number]    04/19/20 1506     Reason for no Mechanical VTE Prophylaxis  Once     Question:  Reasons:  Answer:  IV Heparin within 24 hrs. Pre or Post-Op    04/19/20 1503     IP VTE HIGH RISK PATIENT  Once      04/19/20 1503                Brian Lemos MD  Cardiology   [facility]

## 2020-04-19 NOTE — SUBJECTIVE & OBJECTIVE
Past Medical History:   Diagnosis Date    Asthma     COPD (chronic obstructive pulmonary disease)     home O2 at night only    Coronary artery disease     Hypertension     Pneumonia     Seizures        History reviewed. No pertinent surgical history.    Review of patient's allergies indicates:   Allergen Reactions    Benazepril Swelling    Duoneb [ipratropium-albuterol]      Report Tremors       No current facility-administered medications on file prior to encounter.      Current Outpatient Medications on File Prior to Encounter   Medication Sig    isosorbide-hydrALAZINE 20-37.5 mg (BIDIL) 20-37.5 mg Tab Take 1 tablet by mouth 2 (two) times daily. (Patient taking differently: Take 1 tablet by mouth once daily. )    albuterol (PROVENTIL HFA) 90 mcg/actuation inhaler Inhale 2 puffs into the lungs every 6 (six) hours as needed for Wheezing. Rescue    fluticasone propion-salmeterol 45-21 mcg/dose (ADVAIR HFA) 45-21 mcg/actuation HFAA inhaler Inhale 2 puffs into the lungs every 12 (twelve) hours. Controller    ipratropium-albuterol (COMBIVENT)  mcg/actuation inhaler Inhale 1 puff into the lungs every 6 (six) hours as needed for Wheezing. Rescue    omeprazole (PRILOSEC) 20 MG capsule Take 1 capsule (20 mg total) by mouth once daily.    sacubitriL-valsartan (ENTRESTO) 49-51 mg per tablet Take 1 tablet by mouth 2 (two) times daily.    senna (SENOKOT) 8.6 mg tablet Take 1 tablet by mouth daily as needed for Constipation.    tiotropium (SPIRIVA) 18 mcg inhalation capsule Inhale 1 capsule (18 mcg total) into the lungs once daily. Controller     Family History     Problem Relation (Age of Onset)    Cancer Father    Hypertension Sister    Stroke Sister, Brother        Tobacco Use    Smoking status: Current Every Day Smoker     Packs/day: 0.25     Types: Cigarettes    Smokeless tobacco: Never Used    Tobacco comment: 1-2 cigs per day   Substance and Sexual Activity    Alcohol use: Yes     Alcohol/week:  2.0 standard drinks     Types: 2 Cans of beer per week     Comment: every night    Drug use: No    Sexual activity: Not Currently     Review of Systems   Constitution: Negative.   HENT: Negative.    Eyes: Negative.    Cardiovascular: Negative.    Respiratory: Positive for shortness of breath.    Endocrine: Negative.    Hematologic/Lymphatic: Negative.    Musculoskeletal: Negative.    Gastrointestinal: Negative.    Genitourinary: Negative.    Neurological: Negative.      Objective:     Vital Signs (Most Recent):  Temp: 97.8 °F (36.6 °C) (04/19/20 1054)  Pulse: 108 (04/19/20 1623)  Resp: 20 (04/19/20 1602)  BP: (!) 140/81 (04/19/20 1623)  SpO2: 100 % (04/19/20 1625) Vital Signs (24h Range):  Temp:  [97.8 °F (36.6 °C)] 97.8 °F (36.6 °C)  Pulse:  [] 108  Resp:  [20-29] 20  SpO2:  [79 %-100 %] 100 %  BP: (103-143)/(65-88) 140/81     Weight: 55.3 kg (121 lb 14.6 oz)  Body mass index is 18.54 kg/m².    SpO2: 100 %  O2 Device (Oxygen Therapy): nasal cannula      Intake/Output Summary (Last 24 hours) at 4/19/2020 1641  Last data filed at 4/19/2020 1340  Gross per 24 hour   Intake --   Output 1000 ml   Net -1000 ml       Lines/Drains/Airways     Peripheral Intravenous Line                 Peripheral IV - Single Lumen 04/19/20 1104 18 G Right Forearm less than 1 day                Physical Exam   Constitutional: He is oriented to person, place, and time. He appears well-developed.   HENT:   Head: Normocephalic and atraumatic.   Eyes: Pupils are equal, round, and reactive to light. Conjunctivae are normal.   Neck: Normal range of motion. Neck supple. No JVD present.   Cardiovascular: Normal rate, regular rhythm and normal heart sounds.   Pulmonary/Chest: Effort normal and breath sounds normal.   Mild wheezes present bilaterally    Abdominal: Soft. Bowel sounds are normal.   Musculoskeletal: Normal range of motion.   Neurological: He is alert and oriented to person, place, and time.       Significant Labs:   ABG:    Recent Labs   Lab 04/19/20  1123   PH 7.382   PCO2 44.0   HCO3 26.2   POCSATURATED 95   BE 1   , BMP:   Recent Labs   Lab 04/19/20  1109   *      K 4.3   CL 98   CO2 24   BUN 8   CREATININE 0.9   CALCIUM 8.5*    and CBC   Recent Labs   Lab 04/19/20  1109   WBC 7.73   HGB 13.8*   HCT 43.3          Significant Imaging: X-Ray: CXR: X-Ray Chest 1 View (CXR): No results found for this visit on 04/19/20.

## 2020-04-19 NOTE — HPI
59 years old  male patient with past medical history of COPD on home oxygen 2 L nightly, asthma, seizure disorder (last seizure 4 years), recently diagnosed HFrEF EF 10% on 3/17/20, treated with GDMT and improved to 58%, no ischemic eval presenting today with sudden onset SOB at rest. He was waiting for the bus and felt acutely sob for which he presented to ER. Denies any chest pain, nausea,vomiting, Palpitations, fevers. Previous EKG showed TWI in V1,and V2 waves in lateral leads. He never had stress test and cardiac cath in the past

## 2020-04-19 NOTE — ED NOTES
"Pt identifiers checked and accurate with Baltazar Mccray    Pt reports to ED with complaints of SOB and cough. Pt attempted to go to work and states "couldn't catch my breath". Pt reports being tested for COVID 3 weeks ago, received a negative result. Pt reports nausea without vomiting, pt reports being on 2 L NC at home PRN. Pt denies chest pain, fever, chills, abdominal pain, V/D, contact with illness.     LOC: The patient is awake, alert and aware of environment with an appropriate affect, the patient is oriented x 3 and speaking appropriately.  APPEARANCE: Patient resting comfortably and in no acute distress, patient is clean and well groomed. Pt placed in hospital gown, cardiac monitor, BP cuff, and pulse ox.  SKIN: The skin is warm and dry, color consistent with ethnicity, patient has normal skin turgor and moist mucus membranes, skin intact.  MUSCULOSKELETAL: Patient moving all extremities well, no obvious swelling or deformities noted. Pt ambulates unassisted with steady gait.   RESPIRATORY: Airway is open and patent; respirations are spontaneous, patient has a normal effort and rate, no accessory muscle use noted. Pt presents to ED 80% room air   CARDIAC: Patient has no peripheral edema noted. No complaints of chest pain at this time.   ABDOMEN: Soft and non tender to palpation, no distention noted. Pt reports nausea, denies vomiting and diarrhea   NEUROLOGIC:  eyes open spontaneously, behavior appropriate to situation, follows commands, facial expression symmetrical, purposeful motor response noted    "

## 2020-04-19 NOTE — CONSULTS
[facility]  Cardiology  Consult Note    Patient Name: Baltazar Mccray  MRN: [id number]  Admission Date: 4/19/2020  Hospital Length of Stay: 0 days  Code Status: Prior   Attending Provider: Susan Bell MD   Consulting Provider: Shmuel Wilks MD  Primary Care Physician: [facility]  Principal Problem:NSTEMI (non-ST elevated myocardial infarction)    Patient information was obtained from patient, past medical records and ER records.     Inpatient consult to Cardiology  Consult performed by: Shmuel Wilks MD  Consult ordered by: Susan Bell MD        Subjective:     Chief Complaint:  NSTEMI    HPI:   59 years old  male patient with past medical history of COPD on home oxygen 2 L nightly, asthma, seizure disorder (last seizure 4 years), recently diagnosed HFrEF EF 10% on 3/17/20, treated with GDMT and improved to 58%, no ischemic eval presenting today with sudden onset SOB upon reaching work.  He notes he got on the bus, felt fine, got to work and was suddenly very SOB and nauseous.  Came directly to ER.  No chest pain, vomiting, diarrhea, recent illness.  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    Past Medical History:   Diagnosis Date    Asthma     COPD (chronic obstructive pulmonary disease)     home O2 at night only    Coronary artery disease     Hypertension     Pneumonia     Seizures        History reviewed. No pertinent surgical history.    Review of patient's allergies indicates:   Allergen Reactions    Benazepril Swelling    Duoneb [ipratropium-albuterol]      Report Tremors       No current facility-administered medications on file prior to encounter.      Current Outpatient Medications on File Prior to Encounter   Medication Sig    isosorbide-hydrALAZINE 20-37.5 mg (BIDIL) 20-37.5 mg Tab Take 1 tablet by mouth  2 (two) times daily. (Patient taking differently: Take 1 tablet by mouth once daily. )    albuterol (PROVENTIL HFA) 90 mcg/actuation inhaler Inhale 2 puffs into the lungs every 6 (six) hours as needed for Wheezing. Rescue    fluticasone propion-salmeterol 45-21 mcg/dose (ADVAIR HFA) 45-21 mcg/actuation HFAA inhaler Inhale 2 puffs into the lungs every 12 (twelve) hours. Controller    ipratropium-albuterol (COMBIVENT)  mcg/actuation inhaler Inhale 1 puff into the lungs every 6 (six) hours as needed for Wheezing. Rescue    omeprazole (PRILOSEC) 20 MG capsule Take 1 capsule (20 mg total) by mouth once daily.    sacubitriL-valsartan (ENTRESTO) 49-51 mg per tablet Take 1 tablet by mouth 2 (two) times daily.    senna (SENOKOT) 8.6 mg tablet Take 1 tablet by mouth daily as needed for Constipation.    tiotropium (SPIRIVA) 18 mcg inhalation capsule Inhale 1 capsule (18 mcg total) into the lungs once daily. Controller     Family History     Problem Relation (Age of Onset)    Cancer Father    Hypertension Sister    Stroke Sister, Brother        Tobacco Use    Smoking status: Current Every Day Smoker     Packs/day: 0.25     Types: Cigarettes    Smokeless tobacco: Never Used    Tobacco comment: 1-2 cigs per day   Substance and Sexual Activity    Alcohol use: Yes     Alcohol/week: 2.0 standard drinks     Types: 2 Cans of beer per week     Comment: every night    Drug use: No    Sexual activity: Not Currently     Review of Systems   Constitution: Negative.   HENT: Negative.    Eyes: Negative.    Cardiovascular: Positive for paroxysmal nocturnal dyspnea.   Respiratory: Positive for shortness of breath.    Endocrine: Negative.    Skin: Negative.    Musculoskeletal: Negative.    Gastrointestinal: Negative.    Genitourinary: Negative.    Neurological: Negative.      Objective:     Vital Signs (Most Recent):  Temp: 97.8 °F (36.6 °C) (04/19/20 1054)  Pulse: 90 (04/19/20 1422)  Resp: (!) 26 (04/19/20 1121)  BP: 109/77  (04/19/20 1422)  SpO2: 100 % (04/19/20 1422) Vital Signs (24h Range):  Temp:  [97.8 °F (36.6 °C)] 97.8 °F (36.6 °C)  Pulse:  [] 90  Resp:  [26-29] 26  SpO2:  [79 %-100 %] 100 %  BP: (109-143)/(65-88) 109/77        There is no height or weight on file to calculate BMI.    SpO2: 100 %  O2 Device (Oxygen Therapy): nasal cannula w/ humidification      Intake/Output Summary (Last 24 hours) at 4/19/2020 1452  Last data filed at 4/19/2020 1340  Gross per 24 hour   Intake --   Output 1000 ml   Net -1000 ml       Lines/Drains/Airways     Peripheral Intravenous Line                 Peripheral IV - Single Lumen 04/19/20 1104 18 G Right Forearm less than 1 day                Physical Exam   Constitutional: He is oriented to person, place, and time. He appears well-developed and well-nourished.   HENT:   Head: Normocephalic and atraumatic.   Eyes: Pupils are equal, round, and reactive to light. Conjunctivae and EOM are normal.   Neck: Normal range of motion. Neck supple. No JVD present.   Cardiovascular: Normal rate, regular rhythm and normal heart sounds. Exam reveals no gallop and no friction rub.   No murmur heard.  Pulmonary/Chest: Effort normal and breath sounds normal. No respiratory distress. He has no wheezes. He has no rales. He exhibits no tenderness.   Abdominal: Soft. Bowel sounds are normal. He exhibits no distension. There is no tenderness.   Musculoskeletal: Normal range of motion. He exhibits no edema or tenderness.   Neurological: He is alert and oriented to person, place, and time.   Skin: Skin is warm and dry. No erythema. No pallor.       Significant Labs:   Recent Lab Results       04/19/20  1229   04/19/20  1123   04/19/20  1109        Albumin     3.4     Alkaline Phosphatase     112     Allens Test   Pass       ALT     53     Anion Gap     15     AST     82     Baso #     0.13     Basophil%     1.7     BILIRUBIN TOTAL     0.3  Comment:  For infants and newborns, interpretation of results should be  based  on gestational age, weight and in agreement with clinical  observations.  Premature Infant recommended reference ranges:  Up to 24 hours.............<8.0 mg/dL  Up to 48 hours............<12.0 mg/dL  3-5 days..................<15.0 mg/dL  6-29 days.................<15.0 mg/dL       BNP     1,132  Comment:  Values of less than 100 pg/ml are consistent with non-CHF populations.     Site   RR       BUN, Bld     8     Calcium     8.5     Chloride     98     CO2     24     CPK     95     Creatinine     0.9     CRP     40.1     DelSys   Nasal Can       Differential Method     Automated     eGFR if      >60.0     eGFR if non      >60.0  Comment:  Calculation used to obtain the estimated glomerular filtration  rate (eGFR) is the CKD-EPI equation.        Eos #     0.1     Eosinophil%     1.2     Ferritin     652     FiO2   25       Flow   2       Glucose     133     Gran # (ANC)     3.7     Gran%     47.4     Hematocrit     43.3     Hemoglobin     13.8     Immature Grans (Abs)     0.01  Comment:  Mild elevation in immature granulocytes is non specific and   can be seen in a variety of conditions including stress response,   acute inflammation, trauma and pregnancy. Correlation with other   laboratory and clinical findings is essential.       Immature Granulocytes     0.1     INR     1.0  Comment:  Coumadin Therapy:  2.0 - 3.0 for INR for all indicators except mechanical heart valves  and antiphospholipid syndromes which should use 2.5 - 3.5.       Lactate, Andrew     3.1  Comment:  Falsely low lactic acid results can be found in samples   containing >=13.0 mg/dL total bilirubin and/or >=3.5 mg/dL   direct bilirubin.         Comment:  Results are increased in hemolyzed samples.         Lymph #     2.8     Lymph%     36.5     MCH     28.0     MCHC     31.9     MCV     88     Mode   SPONT       Mono #     1.0     Mono%     13.1     MPV     10.0     nRBC     0     Platelets     318      POC BE   1       POC HCO3   26.2       POC PCO2   44.0       POC PH   7.382       POC PO2   79       POC SATURATED O2   95       POC TCO2   27       Potassium     4.3     PROTEIN TOTAL     7.3     Protime     10.6     RBC     4.92     RDW     16.1     Sample   ARTERIAL       SARS-CoV-2 RNA, Amplification, Qual     Negative  Comment:  This test utilizes isothermal nucleic acid amplification   technology to detect the SARS-CoV-2 RdRp nucleic acid segment.   The analytical sensitivity (limit of detection) is 125 genome   equivalents/mL.   A POSITIVE result implies infection with the SARS-CoV-2 virus;  the patient is presumed to be contagious.    A NEGATIVE result means that SARS-CoV-2 nucleic acids are not  present above the limit of detection. It does not rule out the   possibility of COVID-19 and should not be the sole basis for   treatment decisions. If COVID-19 is strongly suspected based on  clinical and exposure history, re-testing should be considered.   This test is only for use under the Food and Drug   Administration s Emergency Use Authorization (EUA).   Commercial kits are provided by [employer].   Performance characteristics of the EUA have been independently  verified by [facility] Department of  Pathology and Laboratory Medicine.   _________________________________________________________________  The ID NOW COVID-19 Letter of Authorization, along with the   authorized Fact Sheet for Healthcare Providers, the authorized Fact  Sheet for Patients, and authorized labeling are available on the FDA   website:  www.fda.gov/MedicalDevices/Safety/EmergencySituations/mau300091.htm       Sodium     137     Sp02   98       Troponin I     0.289  Comment:  The reference interval for Troponin I represents the 99th percentile   cutoff   for our facility and is consistent with 3rd generation assay   performance.       WBC     7.73           Significant Imaging: Echocardiogram:   Transthoracic echo  (TTE) complete (Cupid Only):   Results for orders placed or performed during the hospital encounter of 04/03/20   Echo Color Flow Doppler? Yes   Result Value Ref Range    BSA 1.63 m2    TDI SEPTAL 0.05 m/s    LV LATERAL E/E' RATIO 8.50 m/s    LV SEPTAL E/E' RATIO 10.20 m/s    LA WIDTH 2.65 cm    TDI LATERAL 0.06 m/s    LVIDD 3.97 3.5 - 6.0 cm    IVS 0.63 0.6 - 1.1 cm    PW 0.76 0.6 - 1.1 cm    LVIDS 3.01 2.1 - 4.0 cm    FS 24 28 - 44 %    LA volume 20.24 cm3    Sinus 2.66 cm    STJ 3.14 cm    Ascending aorta 3.28 cm    LV mass 76.66 g    LA size 2.56 cm    RVDD 3.04 cm    TAPSE 1.59 cm    Left Ventricle Relative Wall Thickness 0.38 cm    AV mean gradient 4 mmHg    AV valve area 2.77 cm2    AV Velocity Ratio 0.75     AV index (prosthetic) 0.88     E/A ratio 0.91     Mean e' 0.06 m/s    E wave decelartion time 377.78 msec    LVOT diameter 2.00 cm    LVOT area 3.1 cm2    LVOT peak alli 0.89 m/s    LVOT peak VTI 14.93 cm    Ao peak alli 1.19 m/s    Ao VTI 16.93 cm    LVOT stroke volume 46.88 cm3    AV peak gradient 6 mmHg    E/E' ratio 9.27 m/s    MV Peak E Alli 0.51 m/s    MV Peak A Alli 0.56 m/s    LV Systolic Volume 35.30 mL    LV Systolic Volume Index 21.3 mL/m2    LV Diastolic Volume 68.78 mL    LV Diastolic Volume Index 41.52 mL/m2    LA Volume Index 12.2 mL/m2    LV Mass Index 46 g/m2    RA Major Axis 3.06 cm    Left Atrium Minor Axis 3.65 cm    Left Atrium Major Axis 3.38 cm    RA Width 2.38 cm    Right Atrial Pressure (from IVC) 3 mmHg    Narrative    · Normal left ventricular systolic function. The estimated ejection   fraction is 58%.  · Septal wall has abnormal motion.  · Local segmental wall motion abnormalities.  · Normal LV diastolic function.  · Low normal right ventricular systolic function.  · Mild mitral sclerosis.  · Normal central venous pressure (3 mmHg).       and X-Ray: CXR without evidence of chf      Assessment and Plan:     * NSTEMI (non-ST elevated myocardial infarction)  Check 2d echo  acs  protocol  Lopressor 25mg p bid  atorva 40mg po daily  Heparin bolus and gtt  Asa/brilinta load   LHC+/- PCI in AM  -Will plan for LHC +/- PCI, patient is a MARIA R candidate  - Anti-platelet Therapy: asa/brilinta  - Access: r radial  - Creatinine/CrCl: 0.9  - Allergies: ace-I, duoneb  - Pre-Hydration: 3cc/kg/hr  - Pre-Op Med:  benadryl  - All patient's questions were answered.  - The risks, benefits & alternatives of the procedure were explained to the patient.   - The risks of coronary angiography include but are not limited to: Bleeding, infection, heart rhythm abnormalities, allergic reactions, kidney injury requiring dilaysis, limb loss, stroke and death   - Should stenting be indicated, the patient has agreed to dual anti-platelet therapy for 1-consecutive year with a drug-eluting stent and a minimum of 1-month with the use of a bare metal stent  - Additionally, pt is aware that non-compliance is likely to result in stent clotting with heart attack, heart failure, and/or death  - The risks of moderate sedation include hypotension, respiratory depression, arrhythmias, bronchospasm, & death  - Informed consent was obtained & the patient is agreeable to proceed with the procedure          VTE Risk Mitigation (From admission, onward)    None          Thank you for your consult. I will follow-up with patient. Please contact us if you have any additional questions.    Shmuel Wilks MD  Cardiology   [facility]

## 2020-04-19 NOTE — ED NOTES
Pt resting on stretcher in NAD, respirations even and unlabored. Stretcher locked and in lowest position, side rails x 2, call bell within reach. Cardiac monitor, pulse ox and BP cuff applied cycling Q 30 minute vitals. Oxygen 2 L NC in place. Pt offered restroom assistance, pt states no needs at this time, urinal within reach. Will continue to monitor and update pt on plan of care.

## 2020-04-19 NOTE — SUBJECTIVE & OBJECTIVE
Past Medical History:   Diagnosis Date    Asthma     COPD (chronic obstructive pulmonary disease)     home O2 at night only    Coronary artery disease     Hypertension     Pneumonia     Seizures        History reviewed. No pertinent surgical history.    Review of patient's allergies indicates:   Allergen Reactions    Benazepril Swelling    Duoneb [ipratropium-albuterol]      Report Tremors       No current facility-administered medications on file prior to encounter.      Current Outpatient Medications on File Prior to Encounter   Medication Sig    isosorbide-hydrALAZINE 20-37.5 mg (BIDIL) 20-37.5 mg Tab Take 1 tablet by mouth 2 (two) times daily. (Patient taking differently: Take 1 tablet by mouth once daily. )    albuterol (PROVENTIL HFA) 90 mcg/actuation inhaler Inhale 2 puffs into the lungs every 6 (six) hours as needed for Wheezing. Rescue    fluticasone propion-salmeterol 45-21 mcg/dose (ADVAIR HFA) 45-21 mcg/actuation HFAA inhaler Inhale 2 puffs into the lungs every 12 (twelve) hours. Controller    ipratropium-albuterol (COMBIVENT)  mcg/actuation inhaler Inhale 1 puff into the lungs every 6 (six) hours as needed for Wheezing. Rescue    omeprazole (PRILOSEC) 20 MG capsule Take 1 capsule (20 mg total) by mouth once daily.    sacubitriL-valsartan (ENTRESTO) 49-51 mg per tablet Take 1 tablet by mouth 2 (two) times daily.    senna (SENOKOT) 8.6 mg tablet Take 1 tablet by mouth daily as needed for Constipation.    tiotropium (SPIRIVA) 18 mcg inhalation capsule Inhale 1 capsule (18 mcg total) into the lungs once daily. Controller     Family History     Problem Relation (Age of Onset)    Cancer Father    Hypertension Sister    Stroke Sister, Brother        Tobacco Use    Smoking status: Current Every Day Smoker     Packs/day: 0.25     Types: Cigarettes    Smokeless tobacco: Never Used    Tobacco comment: 1-2 cigs per day   Substance and Sexual Activity    Alcohol use: Yes     Alcohol/week:  2.0 standard drinks     Types: 2 Cans of beer per week     Comment: every night    Drug use: No    Sexual activity: Not Currently     Review of Systems   Constitution: Negative.   HENT: Negative.    Eyes: Negative.    Cardiovascular: Positive for paroxysmal nocturnal dyspnea.   Respiratory: Positive for shortness of breath.    Endocrine: Negative.    Skin: Negative.    Musculoskeletal: Negative.    Gastrointestinal: Negative.    Genitourinary: Negative.    Neurological: Negative.      Objective:     Vital Signs (Most Recent):  Temp: 97.8 °F (36.6 °C) (04/19/20 1054)  Pulse: 90 (04/19/20 1422)  Resp: (!) 26 (04/19/20 1121)  BP: 109/77 (04/19/20 1422)  SpO2: 100 % (04/19/20 1422) Vital Signs (24h Range):  Temp:  [97.8 °F (36.6 °C)] 97.8 °F (36.6 °C)  Pulse:  [] 90  Resp:  [26-29] 26  SpO2:  [79 %-100 %] 100 %  BP: (109-143)/(65-88) 109/77        There is no height or weight on file to calculate BMI.    SpO2: 100 %  O2 Device (Oxygen Therapy): nasal cannula w/ humidification      Intake/Output Summary (Last 24 hours) at 4/19/2020 1452  Last data filed at 4/19/2020 1340  Gross per 24 hour   Intake --   Output 1000 ml   Net -1000 ml       Lines/Drains/Airways     Peripheral Intravenous Line                 Peripheral IV - Single Lumen 04/19/20 1104 18 G Right Forearm less than 1 day                Physical Exam   Constitutional: He is oriented to person, place, and time. He appears well-developed and well-nourished.   HENT:   Head: Normocephalic and atraumatic.   Eyes: Pupils are equal, round, and reactive to light. Conjunctivae and EOM are normal.   Neck: Normal range of motion. Neck supple. No JVD present.   Cardiovascular: Normal rate, regular rhythm and normal heart sounds. Exam reveals no gallop and no friction rub.   No murmur heard.  Pulmonary/Chest: Effort normal and breath sounds normal. No respiratory distress. He has no wheezes. He has no rales. He exhibits no tenderness.   Abdominal: Soft. Bowel  sounds are normal. He exhibits no distension. There is no tenderness.   Musculoskeletal: Normal range of motion. He exhibits no edema or tenderness.   Neurological: He is alert and oriented to person, place, and time.   Skin: Skin is warm and dry. No erythema. No pallor.       Significant Labs:   Recent Lab Results       04/19/20  1229   04/19/20  1123   04/19/20  1109        Albumin     3.4     Alkaline Phosphatase     112     Allens Test   Pass       ALT     53     Anion Gap     15     AST     82     Baso #     0.13     Basophil%     1.7     BILIRUBIN TOTAL     0.3  Comment:  For infants and newborns, interpretation of results should be based  on gestational age, weight and in agreement with clinical  observations.  Premature Infant recommended reference ranges:  Up to 24 hours.............<8.0 mg/dL  Up to 48 hours............<12.0 mg/dL  3-5 days..................<15.0 mg/dL  6-29 days.................<15.0 mg/dL       BNP     1,132  Comment:  Values of less than 100 pg/ml are consistent with non-CHF populations.     Site   RR       BUN, Bld     8     Calcium     8.5     Chloride     98     CO2     24     CPK     95     Creatinine     0.9     CRP     40.1     DelSys   Nasal Can       Differential Method     Automated     eGFR if      >60.0     eGFR if non      >60.0  Comment:  Calculation used to obtain the estimated glomerular filtration  rate (eGFR) is the CKD-EPI equation.        Eos #     0.1     Eosinophil%     1.2     Ferritin     652     FiO2   25       Flow   2       Glucose     133     Gran # (ANC)     3.7     Gran%     47.4     Hematocrit     43.3     Hemoglobin     13.8     Immature Grans (Abs)     0.01  Comment:  Mild elevation in immature granulocytes is non specific and   can be seen in a variety of conditions including stress response,   acute inflammation, trauma and pregnancy. Correlation with other   laboratory and clinical findings is essential.       Immature  Granulocytes     0.1     INR     1.0  Comment:  Coumadin Therapy:  2.0 - 3.0 for INR for all indicators except mechanical heart valves  and antiphospholipid syndromes which should use 2.5 - 3.5.       Lactate, Andrew     3.1  Comment:  Falsely low lactic acid results can be found in samples   containing >=13.0 mg/dL total bilirubin and/or >=3.5 mg/dL   direct bilirubin.         Comment:  Results are increased in hemolyzed samples.         Lymph #     2.8     Lymph%     36.5     MCH     28.0     MCHC     31.9     MCV     88     Mode   SPONT       Mono #     1.0     Mono%     13.1     MPV     10.0     nRBC     0     Platelets     318     POC BE   1       POC HCO3   26.2       POC PCO2   44.0       POC PH   7.382       POC PO2   79       POC SATURATED O2   95       POC TCO2   27       Potassium     4.3     PROTEIN TOTAL     7.3     Protime     10.6     RBC     4.92     RDW     16.1     Sample   ARTERIAL       SARS-CoV-2 RNA, Amplification, Qual     Negative  Comment:  This test utilizes isothermal nucleic acid amplification   technology to detect the SARS-CoV-2 RdRp nucleic acid segment.   The analytical sensitivity (limit of detection) is 125 genome   equivalents/mL.   A POSITIVE result implies infection with the SARS-CoV-2 virus;  the patient is presumed to be contagious.    A NEGATIVE result means that SARS-CoV-2 nucleic acids are not  present above the limit of detection. It does not rule out the   possibility of COVID-19 and should not be the sole basis for   treatment decisions. If COVID-19 is strongly suspected based on  clinical and exposure history, re-testing should be considered.   This test is only for use under the Food and Drug   Administration s Emergency Use Authorization (EUA).   Commercial kits are provided by [employer].   Performance characteristics of the EUA have been independently  verified by [facility] Department of  Pathology and Laboratory Medicine.    _________________________________________________________________  The ID NOW COVID-19 Letter of Authorization, along with the   authorized Fact Sheet for Healthcare Providers, the authorized Fact  Sheet for Patients, and authorized labeling are available on the FDA   website:  www.fda.gov/MedicalDevices/Safety/EmergencySituations/nni480504.htm       Sodium     137     Sp02   98       Troponin I     0.289  Comment:  The reference interval for Troponin I represents the 99th percentile   cutoff   for our facility and is consistent with 3rd generation assay   performance.       WBC     7.73           Significant Imaging: Echocardiogram:   Transthoracic echo (TTE) complete (Cupid Only):   Results for orders placed or performed during the hospital encounter of 04/03/20   Echo Color Flow Doppler? Yes   Result Value Ref Range    BSA 1.63 m2    TDI SEPTAL 0.05 m/s    LV LATERAL E/E' RATIO 8.50 m/s    LV SEPTAL E/E' RATIO 10.20 m/s    LA WIDTH 2.65 cm    TDI LATERAL 0.06 m/s    LVIDD 3.97 3.5 - 6.0 cm    IVS 0.63 0.6 - 1.1 cm    PW 0.76 0.6 - 1.1 cm    LVIDS 3.01 2.1 - 4.0 cm    FS 24 28 - 44 %    LA volume 20.24 cm3    Sinus 2.66 cm    STJ 3.14 cm    Ascending aorta 3.28 cm    LV mass 76.66 g    LA size 2.56 cm    RVDD 3.04 cm    TAPSE 1.59 cm    Left Ventricle Relative Wall Thickness 0.38 cm    AV mean gradient 4 mmHg    AV valve area 2.77 cm2    AV Velocity Ratio 0.75     AV index (prosthetic) 0.88     E/A ratio 0.91     Mean e' 0.06 m/s    E wave decelartion time 377.78 msec    LVOT diameter 2.00 cm    LVOT area 3.1 cm2    LVOT peak alli 0.89 m/s    LVOT peak VTI 14.93 cm    Ao peak alli 1.19 m/s    Ao VTI 16.93 cm    LVOT stroke volume 46.88 cm3    AV peak gradient 6 mmHg    E/E' ratio 9.27 m/s    MV Peak E Alli 0.51 m/s    MV Peak A Alli 0.56 m/s    LV Systolic Volume 35.30 mL    LV Systolic Volume Index 21.3 mL/m2    LV Diastolic Volume 68.78 mL    LV Diastolic Volume Index 41.52 mL/m2    LA Volume Index 12.2 mL/m2     LV Mass Index 46 g/m2    RA Major Axis 3.06 cm    Left Atrium Minor Axis 3.65 cm    Left Atrium Major Axis 3.38 cm    RA Width 2.38 cm    Right Atrial Pressure (from IVC) 3 mmHg    Narrative    · Normal left ventricular systolic function. The estimated ejection   fraction is 58%.  · Septal wall has abnormal motion.  · Local segmental wall motion abnormalities.  · Normal LV diastolic function.  · Low normal right ventricular systolic function.  · Mild mitral sclerosis.  · Normal central venous pressure (3 mmHg).       and X-Ray: CXR without evidence of chf

## 2020-04-20 LAB
ALBUMIN SERPL BCP-MCNC: 3.5 G/DL (ref 3.5–5.2)
ALP SERPL-CCNC: 118 U/L (ref 55–135)
ALT SERPL W/O P-5'-P-CCNC: 47 U/L (ref 10–44)
ANION GAP SERPL CALC-SCNC: 16 MMOL/L (ref 8–16)
APTT BLDCRRT: 34.6 SEC (ref 21–32)
APTT BLDCRRT: 37.2 SEC (ref 21–32)
APTT BLDCRRT: 37.6 SEC (ref 21–32)
AST SERPL-CCNC: 53 U/L (ref 10–40)
AV INDEX (PROSTH): 0.74
AV MEAN GRADIENT: 3 MMHG
AV PEAK GRADIENT: 5 MMHG
AV VALVE AREA: 2.85 CM2
AV VELOCITY RATIO: 0.77
BASOPHILS # BLD AUTO: 0.11 K/UL (ref 0–0.2)
BASOPHILS NFR BLD: 1.8 % (ref 0–1.9)
BILIRUB SERPL-MCNC: 0.9 MG/DL (ref 0.1–1)
BSA FOR ECHO PROCEDURE: 1.62 M2
BUN SERPL-MCNC: 11 MG/DL (ref 6–20)
CALCIUM SERPL-MCNC: 9 MG/DL (ref 8.7–10.5)
CHLORIDE SERPL-SCNC: 94 MMOL/L (ref 95–110)
CO2 SERPL-SCNC: 29 MMOL/L (ref 23–29)
CREAT SERPL-MCNC: 1 MG/DL (ref 0.5–1.4)
CV ECHO LV RWT: 0.38 CM
DIFFERENTIAL METHOD: ABNORMAL
DOP CALC AO PEAK VEL: 1.12 M/S
DOP CALC AO VTI: 19.69 CM
DOP CALC LVOT AREA: 3.8 CM2
DOP CALC LVOT DIAMETER: 2.21 CM
DOP CALC LVOT PEAK VEL: 0.86 M/S
DOP CALC LVOT STROKE VOLUME: 56.13 CM3
DOP CALCLVOT PEAK VEL VTI: 14.64 CM
E WAVE DECELERATION TIME: 334.91 MSEC
E/A RATIO: 0.79
E/E' RATIO: 8.17 M/S
ECHO LV POSTERIOR WALL: 0.7 CM (ref 0.6–1.1)
EOSINOPHIL # BLD AUTO: 0.1 K/UL (ref 0–0.5)
EOSINOPHIL NFR BLD: 1.1 % (ref 0–8)
ERYTHROCYTE [DISTWIDTH] IN BLOOD BY AUTOMATED COUNT: 15.3 % (ref 11.5–14.5)
EST. GFR  (AFRICAN AMERICAN): >60 ML/MIN/1.73 M^2
EST. GFR  (NON AFRICAN AMERICAN): >60 ML/MIN/1.73 M^2
FRACTIONAL SHORTENING: 27 % (ref 28–44)
GLUCOSE SERPL-MCNC: 72 MG/DL (ref 70–110)
HCT VFR BLD AUTO: 41.3 % (ref 40–54)
HGB BLD-MCNC: 13.2 G/DL (ref 14–18)
IMM GRANULOCYTES # BLD AUTO: 0.02 K/UL (ref 0–0.04)
IMM GRANULOCYTES NFR BLD AUTO: 0.3 % (ref 0–0.5)
INTERVENTRICULAR SEPTUM: 0.83 CM (ref 0.6–1.1)
LA MAJOR: 3.27 CM
LA MINOR: 3.86 CM
LA WIDTH: 3.14 CM
LACTATE SERPL-SCNC: 1.3 MMOL/L (ref 0.5–2.2)
LEFT ATRIUM SIZE: 2.09 CM
LEFT ATRIUM VOLUME INDEX: 12 ML/M2
LEFT ATRIUM VOLUME: 19.75 CM3
LEFT INTERNAL DIMENSION IN SYSTOLE: 2.7 CM (ref 2.1–4)
LEFT VENTRICLE DIASTOLIC VOLUME INDEX: 31.02 ML/M2
LEFT VENTRICLE DIASTOLIC VOLUME: 51.21 ML
LEFT VENTRICLE MASS INDEX: 47 G/M2
LEFT VENTRICLE SYSTOLIC VOLUME INDEX: 16.4 ML/M2
LEFT VENTRICLE SYSTOLIC VOLUME: 27 ML
LEFT VENTRICULAR INTERNAL DIMENSION IN DIASTOLE: 3.7 CM (ref 3.5–6)
LEFT VENTRICULAR MASS: 77.48 G
LV LATERAL E/E' RATIO: 9.8 M/S
LV SEPTAL E/E' RATIO: 7 M/S
LYMPHOCYTES # BLD AUTO: 1 K/UL (ref 1–4.8)
LYMPHOCYTES NFR BLD: 16.4 % (ref 18–48)
MCH RBC QN AUTO: 27.3 PG (ref 27–31)
MCHC RBC AUTO-ENTMCNC: 32 G/DL (ref 32–36)
MCV RBC AUTO: 85 FL (ref 82–98)
MONOCYTES # BLD AUTO: 1 K/UL (ref 0.3–1)
MONOCYTES NFR BLD: 16.4 % (ref 4–15)
MV PEAK A VEL: 0.62 M/S
MV PEAK E VEL: 0.49 M/S
NEUTROPHILS # BLD AUTO: 4 K/UL (ref 1.8–7.7)
NEUTROPHILS NFR BLD: 64 % (ref 38–73)
NRBC BLD-RTO: 0 /100 WBC
PLATELET # BLD AUTO: 300 K/UL (ref 150–350)
PMV BLD AUTO: 10.2 FL (ref 9.2–12.9)
POTASSIUM SERPL-SCNC: 3.9 MMOL/L (ref 3.5–5.1)
PROT SERPL-MCNC: 7.7 G/DL (ref 6–8.4)
RA MAJOR: 3.7 CM
RA PRESSURE: 3 MMHG
RA WIDTH: 2.68 CM
RBC # BLD AUTO: 4.84 M/UL (ref 4.6–6.2)
RIGHT VENTRICULAR END-DIASTOLIC DIMENSION: 3.27 CM
RV TISSUE DOPPLER FREE WALL SYSTOLIC VELOCITY 1 (APICAL 4 CHAMBER VIEW): 7.77 CM/S
SINUS: 3.17 CM
SODIUM SERPL-SCNC: 139 MMOL/L (ref 136–145)
STJ: 2.48 CM
TDI LATERAL: 0.05 M/S
TDI SEPTAL: 0.07 M/S
TDI: 0.06 M/S
TRICUSPID ANNULAR PLANE SYSTOLIC EXCURSION: 1.27 CM
WBC # BLD AUTO: 6.21 K/UL (ref 3.9–12.7)

## 2020-04-20 PROCEDURE — 85730 THROMBOPLASTIN TIME PARTIAL: CPT

## 2020-04-20 PROCEDURE — 25000003 PHARM REV CODE 250: Performed by: INTERNAL MEDICINE

## 2020-04-20 PROCEDURE — 99231 SBSQ HOSP IP/OBS SF/LOW 25: CPT | Mod: ,,, | Performed by: INTERNAL MEDICINE

## 2020-04-20 PROCEDURE — 85025 COMPLETE CBC W/AUTO DIFF WBC: CPT

## 2020-04-20 PROCEDURE — 93005 ELECTROCARDIOGRAM TRACING: CPT

## 2020-04-20 PROCEDURE — 63600175 PHARM REV CODE 636 W HCPCS: Performed by: STUDENT IN AN ORGANIZED HEALTH CARE EDUCATION/TRAINING PROGRAM

## 2020-04-20 PROCEDURE — 25000242 PHARM REV CODE 250 ALT 637 W/ HCPCS: Performed by: STUDENT IN AN ORGANIZED HEALTH CARE EDUCATION/TRAINING PROGRAM

## 2020-04-20 PROCEDURE — 83605 ASSAY OF LACTIC ACID: CPT

## 2020-04-20 PROCEDURE — 63600175 PHARM REV CODE 636 W HCPCS: Performed by: INTERNAL MEDICINE

## 2020-04-20 PROCEDURE — 99900035 HC TECH TIME PER 15 MIN (STAT)

## 2020-04-20 PROCEDURE — 36415 COLL VENOUS BLD VENIPUNCTURE: CPT

## 2020-04-20 PROCEDURE — 20600001 HC STEP DOWN PRIVATE ROOM

## 2020-04-20 PROCEDURE — 93010 EKG 12-LEAD: ICD-10-PCS | Mod: ,,, | Performed by: INTERNAL MEDICINE

## 2020-04-20 PROCEDURE — 25000242 PHARM REV CODE 250 ALT 637 W/ HCPCS: Performed by: HOSPITALIST

## 2020-04-20 PROCEDURE — 99231 PR SUBSEQUENT HOSPITAL CARE,LEVL I: ICD-10-PCS | Mod: ,,, | Performed by: INTERNAL MEDICINE

## 2020-04-20 PROCEDURE — 93010 ELECTROCARDIOGRAM REPORT: CPT | Mod: ,,, | Performed by: INTERNAL MEDICINE

## 2020-04-20 PROCEDURE — 94761 N-INVAS EAR/PLS OXIMETRY MLT: CPT

## 2020-04-20 PROCEDURE — 85730 THROMBOPLASTIN TIME PARTIAL: CPT | Mod: 91

## 2020-04-20 PROCEDURE — 80053 COMPREHEN METABOLIC PANEL: CPT

## 2020-04-20 PROCEDURE — 94640 AIRWAY INHALATION TREATMENT: CPT

## 2020-04-20 RX ORDER — IPRATROPIUM BROMIDE AND ALBUTEROL SULFATE 2.5; .5 MG/3ML; MG/3ML
3 SOLUTION RESPIRATORY (INHALATION) EVERY 6 HOURS
Status: DISCONTINUED | OUTPATIENT
Start: 2020-04-20 | End: 2020-04-23 | Stop reason: HOSPADM

## 2020-04-20 RX ORDER — PREDNISONE 20 MG/1
40 TABLET ORAL DAILY
Status: DISCONTINUED | OUTPATIENT
Start: 2020-04-21 | End: 2020-04-23 | Stop reason: HOSPADM

## 2020-04-20 RX ORDER — FUROSEMIDE 10 MG/ML
80 INJECTION INTRAMUSCULAR; INTRAVENOUS ONCE
Status: COMPLETED | OUTPATIENT
Start: 2020-04-20 | End: 2020-04-20

## 2020-04-20 RX ADMIN — FUROSEMIDE 80 MG: 10 INJECTION, SOLUTION INTRAMUSCULAR; INTRAVENOUS at 08:04

## 2020-04-20 RX ADMIN — IPRATROPIUM BROMIDE AND ALBUTEROL SULFATE 3 ML: .5; 3 SOLUTION RESPIRATORY (INHALATION) at 07:04

## 2020-04-20 RX ADMIN — HEPARIN SODIUM AND DEXTROSE 14 UNITS/KG/HR: 10000; 5 INJECTION INTRAVENOUS at 10:04

## 2020-04-20 RX ADMIN — METOPROLOL TARTRATE 25 MG: 25 TABLET, FILM COATED ORAL at 09:04

## 2020-04-20 RX ADMIN — ATORVASTATIN CALCIUM 40 MG: 20 TABLET, FILM COATED ORAL at 09:04

## 2020-04-20 RX ADMIN — TICAGRELOR 90 MG: 90 TABLET ORAL at 04:04

## 2020-04-20 RX ADMIN — IPRATROPIUM BROMIDE AND ALBUTEROL SULFATE 3 ML: .5; 3 SOLUTION RESPIRATORY (INHALATION) at 11:04

## 2020-04-20 RX ADMIN — METOPROLOL TARTRATE 25 MG: 25 TABLET, FILM COATED ORAL at 08:04

## 2020-04-20 RX ADMIN — HYDRALAZINE HYDROCHLORIDE AND ISOSORBIDE DINITRATE 1 TABLET: 37.5; 2 TABLET, FILM COATED ORAL at 08:04

## 2020-04-20 RX ADMIN — ASPIRIN 81 MG: 81 TABLET, COATED ORAL at 09:04

## 2020-04-20 RX ADMIN — TICAGRELOR 90 MG: 90 TABLET ORAL at 08:04

## 2020-04-20 RX ADMIN — HYDRALAZINE HYDROCHLORIDE AND ISOSORBIDE DINITRATE 1 TABLET: 37.5; 2 TABLET, FILM COATED ORAL at 09:04

## 2020-04-20 RX ADMIN — HEPARIN SODIUM AND DEXTROSE 14 UNITS/KG/HR: 10000; 5 INJECTION INTRAVENOUS at 01:04

## 2020-04-20 NOTE — PLAN OF CARE
Heparin running per MD order. 16u/kg/hr x 55kg. Breathing tx done today. 80mg lasix Iv push given. Plan of care discussed with patient. Fall precautions in place. Patient has no complaints of pain. Discussed medications and care. Patient has no questions at this time. Will continue to monitor.

## 2020-04-20 NOTE — PLAN OF CARE
Educated patient on plan of care, safety while in hospital, medications, prn meds, how to use call light, NPO status at MN and fall precautions. Patient stated understanding. No falls during shift. Vitals stable. Patient is sinus rhythm on telemetry. Patient currently resting in bed. MD Raven notified about patient's shortness of breath and stated she will order breathing treatment. Patient spoke with MD. Bed in lowest position, call light within reach and urinal within reach, and side rails up x2.  Will continue to monitor.

## 2020-04-20 NOTE — PROGRESS NOTES
Cardiology on call MD notified about patient's elevated BP. MD stated that he will look into patient's chart and will order meds. Will continue to monitor.

## 2020-04-20 NOTE — SUBJECTIVE & OBJECTIVE
Interval History: c/O sob and orthopnea  this morning.     Review of Systems   Constitution: Negative.   HENT: Negative.    Eyes: Negative.    Respiratory: Positive for shortness of breath.    Skin: Negative.    Musculoskeletal: Negative.    Gastrointestinal: Negative.    Neurological: Negative.      Objective:     Vital Signs (Most Recent):  Temp: 98.7 °F (37.1 °C) (04/20/20 0756)  Pulse: 74 (04/20/20 0819)  Resp: 18 (04/20/20 0756)  BP: (!) 182/88 (04/20/20 0819)  SpO2: 100 % (04/20/20 0756) Vital Signs (24h Range):  Temp:  [98.1 °F (36.7 °C)-99 °F (37.2 °C)] 98.7 °F (37.1 °C)  Pulse:  [] 74  Resp:  [16-26] 18  SpO2:  [91 %-100 %] 100 %  BP: (103-182)/(65-94) 182/88     Weight: 54.9 kg (121 lb)  Body mass index is 18.4 kg/m².     SpO2: 100 %  O2 Device (Oxygen Therapy): nasal cannula      Intake/Output Summary (Last 24 hours) at 4/20/2020 1102  Last data filed at 4/19/2020 2200  Gross per 24 hour   Intake 0 ml   Output 1600 ml   Net -1600 ml       Lines/Drains/Airways     Peripheral Intravenous Line                 Peripheral IV - Single Lumen 04/19/20 1104 18 G Right Forearm less than 1 day         Peripheral IV - Single Lumen 04/19/20 1645 20 G Left Forearm less than 1 day                Physical Exam  Constitutional: He is oriented to person, place, and time. He appears well-developed.   HENT:   Head: Normocephalic and atraumatic.   Eyes: Pupils are equal, round, and reactive to light. Conjunctivae are normal.   Neck: Normal range of motion. Neck supple. No JVD present.   Cardiovascular: Normal rate, regular rhythm and normal heart sounds.   Pulmonary/Chest: Effort normal and breath sounds normal.   Mild wheezes present bilaterally    Abdominal: Soft. Bowel sounds are normal.   Musculoskeletal: Normal range of motion.   Neurological: He is alert and oriented to person, place, and time.      Significant Labs:   BMP:   Recent Labs   Lab 04/19/20  1109 04/20/20  0900   * 72    139   K 4.3 3.9    CL 98 94*   CO2 24 29   BUN 8 11   CREATININE 0.9 1.0   CALCIUM 8.5* 9.0    and CBC   Recent Labs   Lab 04/19/20  1109 04/20/20  0900   WBC 7.73 6.21   HGB 13.8* 13.2*   HCT 43.3 41.3    300       Significant Imaging: X-Ray: CXR: X-Ray Chest 1 View (CXR): No results found for this visit on 04/19/20.

## 2020-04-20 NOTE — PLAN OF CARE
"   04/20/20 1444   Discharge Assessment   Assessment Type Discharge Planning Assessment   Confirmed/corrected address and phone number on facesheet? Yes   Assessment information obtained from? Patient   Expected Length of Stay (days) 3   Communicated expected length of stay with patient/caregiver yes   Prior to hospitilization cognitive status: Alert/Oriented   Prior to hospitalization functional status: Independent   Current cognitive status: Alert/Oriented   Current Functional Status: Independent   Facility Arrived From: home   Lives With other relative(s)  (Nephew)   Able to Return to Prior Arrangements yes   Is patient able to care for self after discharge? Yes   Who are your caregiver(s) and their phone number(s)? Gladis (sister)    Patient's perception of discharge disposition home or selfcare   Patient currently being followed by outpatient case management? No   Patient currently receives any other outside agency services? No   Equipment Currently Used at Home oxygen   Does the patient have transportation home? Yes  (Pt states "I live right down the street")   Transportation Anticipated family or friend will provide   Does the patient receive services at the [facility]? No   Discharge Plan A Home;Home with family   Discharge Plan B Home;Home with family   DME Needed Upon Discharge  none   Patient/Family in Agreement with Plan yes     CM completed d/c assessment over phone. Anticipate d/c home with no needs. Pt state he lives with his nephew. When asked patient if he needs transport home, he says he only lives half a block away. May need Lyft at d/c. Pt uses 02 at home.    Jaden  [phone]    Extended Emergency Contact Information  Primary Emergency Contact: Gladis Mccray   [place]  Mobile Phone: [phone]  Relation: Sister  Secondary Emergency Contact: Viry Mccray   [place]  Mobile Phone: [phone]  Relation: " Sister      [id number] - [place], [place] - [address]  [address]  [place] [address]  Phone: [phone] Fax: [phone]    [id number] - [place], [place] - [address] [facility] [address]  [address]  [facility] [address]  Phone: [phone] Fax: [phone]    [facility]  [address]  [facility] [address]  Phone: [phone] Fax: [phone]      Payor: MEDICAID / Plan: [place] [facility] / Product Type: Managed Medicaid /     Julie Haase RN  Case Management [phone]

## 2020-04-20 NOTE — PROGRESS NOTES
[facility]  Cardiology  Progress Note    Patient Name: Baltazar Mccray  MRN: [id number]  Admission Date: 4/19/2020  Hospital Length of Stay: 1 days  Code Status: Full Code   Attending Physician: Maximiliano Acevedo MD   Primary Care Physician: Rhonda Of Katia  Expected Discharge Date:   Principal Problem:NSTEMI (non-ST elevated myocardial infarction)    Subjective:     Hospital Course:   No notes on file    Interval History: c/O sob and orthopnea  this morning.     Review of Systems   Constitution: Negative.   HENT: Negative.    Eyes: Negative.    Respiratory: Positive for shortness of breath.    Skin: Negative.    Musculoskeletal: Negative.    Gastrointestinal: Negative.    Neurological: Negative.      Objective:     Vital Signs (Most Recent):  Temp: 98.7 °F (37.1 °C) (04/20/20 0756)  Pulse: 74 (04/20/20 0819)  Resp: 18 (04/20/20 0756)  BP: (!) 182/88 (04/20/20 0819)  SpO2: 100 % (04/20/20 0756) Vital Signs (24h Range):  Temp:  [98.1 °F (36.7 °C)-99 °F (37.2 °C)] 98.7 °F (37.1 °C)  Pulse:  [] 74  Resp:  [16-26] 18  SpO2:  [91 %-100 %] 100 %  BP: (103-182)/(65-94) 182/88     Weight: 54.9 kg (121 lb)  Body mass index is 18.4 kg/m².     SpO2: 100 %  O2 Device (Oxygen Therapy): nasal cannula      Intake/Output Summary (Last 24 hours) at 4/20/2020 1102  Last data filed at 4/19/2020 2200  Gross per 24 hour   Intake 0 ml   Output 1600 ml   Net -1600 ml       Lines/Drains/Airways     Peripheral Intravenous Line                 Peripheral IV - Single Lumen 04/19/20 1104 18 G Right Forearm less than 1 day         Peripheral IV - Single Lumen 04/19/20 1645 20 G Left Forearm less than 1 day                Physical Exam  Constitutional: He is oriented to person, place, and time. He appears well-developed.   HENT:   Head: Normocephalic and atraumatic.   Eyes: Pupils are equal, round, and reactive to light. Conjunctivae are normal.   Neck: Normal range of motion. Neck supple. No JVD present.    Cardiovascular: Normal rate, regular rhythm and normal heart sounds.   Pulmonary/Chest: Effort normal and breath sounds normal.   Mild wheezes present bilaterally    Abdominal: Soft. Bowel sounds are normal.   Musculoskeletal: Normal range of motion.   Neurological: He is alert and oriented to person, place, and time.      Significant Labs:   BMP:   Recent Labs   Lab 04/19/20  1109 04/20/20  0900   * 72    139   K 4.3 3.9   CL 98 94*   CO2 24 29   BUN 8 11   CREATININE 0.9 1.0   CALCIUM 8.5* 9.0    and CBC   Recent Labs   Lab 04/19/20  1109 04/20/20  0900   WBC 7.73 6.21   HGB 13.8* 13.2*   HCT 43.3 41.3    300       Significant Imaging: X-Ray: CXR: X-Ray Chest 1 View (CXR): No results found for this visit on 04/19/20.    Assessment and Plan:          * NSTEMI (non-ST elevated myocardial infarction)  Elevated Troponin on presentation   ACS  Protocol. Loaded with aspirin and ticagrelor. On aspirin 81 ticagrelor , metoprolol  lipitor 40 mg daily   Heparin bolus and gtt. echo shows  Septal hypokinesis   IC to cath him tomorrow .  EKG today       Chronic systolic congestive heart failure  H/o Systolic heart failure. Rpt Echo showed improvement in EF.     COPD (chronic obstructive pulmonary disease)        Will treat with COPD exacerbation   ABG showed respiratory acidosis. On Spiriva and adaviar. Will hold spiriva and advair and do duo nebs ATC  and start him on prednisone       VTE Risk Mitigation (From admission, onward)         Ordered     heparin 25,000 units in dextrose 5% (100 units/ml) IV bolus from bag - ADDITIONAL PRN BOLUS - 60 units/kg (max bolus 4000 units)  As needed (PRN)     Question:  Heparin Infusion Adjustment (DO NOT MODIFY ANSWER)  Answer:  [email or web address]\epic\Images\Pharmacy\HeparinInfusions\heparin LOW INTENSITY nomogram for [facility] [id number]    04/19/20 1506     heparin 25,000 units in dextrose 5% (100 units/ml) IV bolus from bag - ADDITIONAL PRN BOLUS - 30 units/kg (max bolus  4000 units)  As needed (PRN)     Question:  Heparin Infusion Adjustment (DO NOT MODIFY ANSWER)  Answer:  \\[email or web address]\epic\Images\Pharmacy\HeparinInfusions\heparin LOW INTENSITY nomogram for [facility] [id number]    04/19/20 1506     heparin 25,000 units in dextrose 5% 250 mL (100 units/mL) infusion LOW INTENSITY nomogram - [facility]  Continuous     Question:  Heparin Infusion Adjustment (DO NOT MODIFY ANSWER)  Answer:  \\[email or web address]\epic\Images\Pharmacy\HeparinInfusions\heparin LOW INTENSITY nomogram for [facility] [id number]    04/19/20 1506     Reason for no Mechanical VTE Prophylaxis  Once     Question:  Reasons:  Answer:  IV Heparin within 24 hrs. Pre or Post-Op    04/19/20 1503     IP VTE HIGH RISK PATIENT  Once      04/19/20 1503                Brian Lemos MD  Cardiology  [facility]

## 2020-04-21 LAB
ALBUMIN SERPL BCP-MCNC: 3.4 G/DL (ref 3.5–5.2)
ALP SERPL-CCNC: 109 U/L (ref 55–135)
ALT SERPL W/O P-5'-P-CCNC: 38 U/L (ref 10–44)
ANION GAP SERPL CALC-SCNC: 17 MMOL/L (ref 8–16)
APTT BLDCRRT: 48.4 SEC (ref 21–32)
APTT BLDCRRT: 50.8 SEC (ref 21–32)
AST SERPL-CCNC: 47 U/L (ref 10–40)
BASOPHILS # BLD AUTO: 0.06 K/UL (ref 0–0.2)
BASOPHILS NFR BLD: 1 % (ref 0–1.9)
BILIRUB SERPL-MCNC: 0.8 MG/DL (ref 0.1–1)
BUN SERPL-MCNC: 18 MG/DL (ref 6–20)
CALCIUM SERPL-MCNC: 9.1 MG/DL (ref 8.7–10.5)
CHLORIDE SERPL-SCNC: 92 MMOL/L (ref 95–110)
CO2 SERPL-SCNC: 28 MMOL/L (ref 23–29)
CREAT SERPL-MCNC: 1.6 MG/DL (ref 0.5–1.4)
DIFFERENTIAL METHOD: ABNORMAL
EOSINOPHIL # BLD AUTO: 0.1 K/UL (ref 0–0.5)
EOSINOPHIL NFR BLD: 1.8 % (ref 0–8)
ERYTHROCYTE [DISTWIDTH] IN BLOOD BY AUTOMATED COUNT: 15.3 % (ref 11.5–14.5)
EST. GFR  (AFRICAN AMERICAN): 54.5 ML/MIN/1.73 M^2
EST. GFR  (NON AFRICAN AMERICAN): 47.1 ML/MIN/1.73 M^2
GLUCOSE SERPL-MCNC: 89 MG/DL (ref 70–110)
HCT VFR BLD AUTO: 40.3 % (ref 40–54)
HGB BLD-MCNC: 13 G/DL (ref 14–18)
IMM GRANULOCYTES # BLD AUTO: 0.03 K/UL (ref 0–0.04)
IMM GRANULOCYTES NFR BLD AUTO: 0.5 % (ref 0–0.5)
LYMPHOCYTES # BLD AUTO: 1.6 K/UL (ref 1–4.8)
LYMPHOCYTES NFR BLD: 26.2 % (ref 18–48)
MCH RBC QN AUTO: 27.5 PG (ref 27–31)
MCHC RBC AUTO-ENTMCNC: 32.3 G/DL (ref 32–36)
MCV RBC AUTO: 85 FL (ref 82–98)
MONOCYTES # BLD AUTO: 0.9 K/UL (ref 0.3–1)
MONOCYTES NFR BLD: 14.5 % (ref 4–15)
NEUTROPHILS # BLD AUTO: 3.5 K/UL (ref 1.8–7.7)
NEUTROPHILS NFR BLD: 56 % (ref 38–73)
NRBC BLD-RTO: 0 /100 WBC
PLATELET # BLD AUTO: 335 K/UL (ref 150–350)
PMV BLD AUTO: 10.8 FL (ref 9.2–12.9)
POTASSIUM SERPL-SCNC: 3.3 MMOL/L (ref 3.5–5.1)
PROT SERPL-MCNC: 7.4 G/DL (ref 6–8.4)
RBC # BLD AUTO: 4.72 M/UL (ref 4.6–6.2)
SARS-COV-2 RNA RESP QL NAA+PROBE: NOT DETECTED
SODIUM SERPL-SCNC: 137 MMOL/L (ref 136–145)
WBC # BLD AUTO: 6.22 K/UL (ref 3.9–12.7)

## 2020-04-21 PROCEDURE — 63600175 PHARM REV CODE 636 W HCPCS: Performed by: HOSPITALIST

## 2020-04-21 PROCEDURE — 63600175 PHARM REV CODE 636 W HCPCS: Performed by: INTERNAL MEDICINE

## 2020-04-21 PROCEDURE — 27000221 HC OXYGEN, UP TO 24 HOURS

## 2020-04-21 PROCEDURE — 93005 ELECTROCARDIOGRAM TRACING: CPT

## 2020-04-21 PROCEDURE — 80053 COMPREHEN METABOLIC PANEL: CPT

## 2020-04-21 PROCEDURE — 93010 EKG 12-LEAD: ICD-10-PCS | Mod: ,,, | Performed by: INTERNAL MEDICINE

## 2020-04-21 PROCEDURE — 94761 N-INVAS EAR/PLS OXIMETRY MLT: CPT

## 2020-04-21 PROCEDURE — 93010 ELECTROCARDIOGRAM REPORT: CPT | Mod: ,,, | Performed by: INTERNAL MEDICINE

## 2020-04-21 PROCEDURE — 36415 COLL VENOUS BLD VENIPUNCTURE: CPT

## 2020-04-21 PROCEDURE — 85025 COMPLETE CBC W/AUTO DIFF WBC: CPT

## 2020-04-21 PROCEDURE — 20600001 HC STEP DOWN PRIVATE ROOM

## 2020-04-21 PROCEDURE — U0002 COVID-19 LAB TEST NON-CDC: HCPCS

## 2020-04-21 PROCEDURE — 25000003 PHARM REV CODE 250: Performed by: STUDENT IN AN ORGANIZED HEALTH CARE EDUCATION/TRAINING PROGRAM

## 2020-04-21 PROCEDURE — 25000003 PHARM REV CODE 250: Performed by: INTERNAL MEDICINE

## 2020-04-21 PROCEDURE — 85730 THROMBOPLASTIN TIME PARTIAL: CPT | Mod: 91

## 2020-04-21 PROCEDURE — 99231 PR SUBSEQUENT HOSPITAL CARE,LEVL I: ICD-10-PCS | Mod: ,,, | Performed by: INTERNAL MEDICINE

## 2020-04-21 PROCEDURE — 25000242 PHARM REV CODE 250 ALT 637 W/ HCPCS: Performed by: STUDENT IN AN ORGANIZED HEALTH CARE EDUCATION/TRAINING PROGRAM

## 2020-04-21 PROCEDURE — 99231 SBSQ HOSP IP/OBS SF/LOW 25: CPT | Mod: ,,, | Performed by: INTERNAL MEDICINE

## 2020-04-21 PROCEDURE — 25000242 PHARM REV CODE 250 ALT 637 W/ HCPCS: Performed by: HOSPITALIST

## 2020-04-21 PROCEDURE — 94640 AIRWAY INHALATION TREATMENT: CPT

## 2020-04-21 PROCEDURE — 85730 THROMBOPLASTIN TIME PARTIAL: CPT

## 2020-04-21 RX ORDER — DOXYCYCLINE HYCLATE 100 MG
100 TABLET ORAL EVERY 12 HOURS
Status: DISCONTINUED | OUTPATIENT
Start: 2020-04-21 | End: 2020-04-23 | Stop reason: HOSPADM

## 2020-04-21 RX ORDER — AZITHROMYCIN 250 MG/1
500 TABLET, FILM COATED ORAL DAILY
Status: DISCONTINUED | OUTPATIENT
Start: 2020-04-21 | End: 2020-04-21

## 2020-04-21 RX ORDER — TIOTROPIUM BROMIDE 18 UG/1
1 CAPSULE ORAL; RESPIRATORY (INHALATION) DAILY
Status: DISCONTINUED | OUTPATIENT
Start: 2020-04-21 | End: 2020-04-23 | Stop reason: HOSPADM

## 2020-04-21 RX ORDER — DIPHENHYDRAMINE HCL 50 MG
50 CAPSULE ORAL EVERY 6 HOURS
Status: DISPENSED | OUTPATIENT
Start: 2020-04-21 | End: 2020-04-21

## 2020-04-21 RX ORDER — POTASSIUM CHLORIDE 20 MEQ/1
60 TABLET, EXTENDED RELEASE ORAL ONCE
Status: COMPLETED | OUTPATIENT
Start: 2020-04-21 | End: 2020-04-21

## 2020-04-21 RX ORDER — SODIUM CHLORIDE 9 MG/ML
3 INJECTION, SOLUTION INTRAVENOUS CONTINUOUS
Status: ACTIVE | OUTPATIENT
Start: 2020-04-21 | End: 2020-04-21

## 2020-04-21 RX ADMIN — TIOTROPIUM BROMIDE 18 MCG: 18 CAPSULE ORAL; RESPIRATORY (INHALATION) at 12:04

## 2020-04-21 RX ADMIN — ASPIRIN 81 MG: 81 TABLET, COATED ORAL at 08:04

## 2020-04-21 RX ADMIN — DOXYCYCLINE HYCLATE 100 MG: 100 TABLET, COATED ORAL at 12:04

## 2020-04-21 RX ADMIN — FLUTICASONE FUROATE AND VILANTEROL TRIFENATATE 1 PUFF: 200; 25 POWDER RESPIRATORY (INHALATION) at 12:04

## 2020-04-21 RX ADMIN — PREDNISONE 40 MG: 20 TABLET ORAL at 08:04

## 2020-04-21 RX ADMIN — POTASSIUM CHLORIDE 60 MEQ: 1500 TABLET, EXTENDED RELEASE ORAL at 12:04

## 2020-04-21 RX ADMIN — TICAGRELOR 90 MG: 90 TABLET ORAL at 08:04

## 2020-04-21 RX ADMIN — DIPHENHYDRAMINE HYDROCHLORIDE 50 MG: 50 CAPSULE ORAL at 05:04

## 2020-04-21 RX ADMIN — METOPROLOL TARTRATE 25 MG: 25 TABLET, FILM COATED ORAL at 08:04

## 2020-04-21 RX ADMIN — DOXYCYCLINE HYCLATE 100 MG: 100 TABLET, COATED ORAL at 08:04

## 2020-04-21 RX ADMIN — IPRATROPIUM BROMIDE AND ALBUTEROL SULFATE 3 ML: .5; 3 SOLUTION RESPIRATORY (INHALATION) at 02:04

## 2020-04-21 RX ADMIN — HYDRALAZINE HYDROCHLORIDE AND ISOSORBIDE DINITRATE 1 TABLET: 37.5; 2 TABLET, FILM COATED ORAL at 08:04

## 2020-04-21 RX ADMIN — HEPARIN SODIUM AND DEXTROSE 18 UNITS/KG/HR: 10000; 5 INJECTION INTRAVENOUS at 06:04

## 2020-04-21 RX ADMIN — IPRATROPIUM BROMIDE AND ALBUTEROL SULFATE 3 ML: .5; 3 SOLUTION RESPIRATORY (INHALATION) at 01:04

## 2020-04-21 RX ADMIN — IPRATROPIUM BROMIDE AND ALBUTEROL SULFATE 3 ML: .5; 3 SOLUTION RESPIRATORY (INHALATION) at 07:04

## 2020-04-21 RX ADMIN — DIPHENHYDRAMINE HYDROCHLORIDE 50 MG: 50 CAPSULE ORAL at 12:04

## 2020-04-21 RX ADMIN — ATORVASTATIN CALCIUM 40 MG: 20 TABLET, FILM COATED ORAL at 08:04

## 2020-04-21 NOTE — NURSING
Dr. Agustin notified of pt continuous heparin drip. MD explained that Cath lab will notify nurse to stop heparin drip approximately 1hr before procedure. Heparin drip still running, waiting for further instructions from Cath Lab.

## 2020-04-21 NOTE — SUBJECTIVE & OBJECTIVE
Interval History: Feels much better this AM. Denies SOB at rest and chest pain. States he slept lying flat all night. Got up to use the bathroom and had some mild dyspnea. Cath this AM cancelled due to LACHELLE.     Review of Systems   Constitution: Negative for decreased appetite, diaphoresis and malaise/fatigue.   HENT: Negative for nosebleeds.    Eyes: Negative.    Cardiovascular: Positive for dyspnea on exertion. Negative for chest pain, claudication, irregular heartbeat, leg swelling, near-syncope, orthopnea, palpitations, paroxysmal nocturnal dyspnea and syncope.   Respiratory: Positive for shortness of breath. Negative for snoring, sputum production and wheezing.    Hematologic/Lymphatic: Negative for bleeding problem. Does not bruise/bleed easily.   Skin: Negative.    Musculoskeletal: Negative for falls, muscle cramps and myalgias.   Gastrointestinal: Negative for bloating, abdominal pain, nausea and vomiting.   Neurological: Negative for dizziness, headaches and light-headedness.     Objective:     Vital Signs (Most Recent):  Temp: 98.4 °F (36.9 °C) (04/21/20 1117)  Pulse: 70 (04/21/20 1434)  Resp: 16 (04/21/20 1434)  BP: 106/63 (04/21/20 1117)  SpO2: 98 % (04/21/20 1219) Vital Signs (24h Range):  Temp:  [97.4 °F (36.3 °C)-98.4 °F (36.9 °C)] 98.4 °F (36.9 °C)  Pulse:  [69-95] 70  Resp:  [16-20] 16  SpO2:  [93 %-100 %] 98 %  BP: (106-143)/(63-88) 106/63     Weight: 54.9 kg (121 lb)  Body mass index is 18.4 kg/m².     SpO2: 98 %  O2 Device (Oxygen Therapy): nasal cannula      Intake/Output Summary (Last 24 hours) at 4/21/2020 1441  Last data filed at 4/21/2020 0830  Gross per 24 hour   Intake 240 ml   Output 595 ml   Net -355 ml       Lines/Drains/Airways     Peripheral Intravenous Line                 Peripheral IV - Single Lumen 04/19/20 1104 18 G Right Forearm 2 days         Peripheral IV - Single Lumen 04/19/20 1645 20 G Left Forearm 1 day                Physical Exam   Constitutional: He is oriented to  person, place, and time. He appears well-developed and well-nourished. No distress.   HENT:   Head: Normocephalic and atraumatic.   Eyes: Conjunctivae and EOM are normal.   Neck: Neck supple. No JVD present. Carotid bruit is not present.   Cardiovascular: Normal rate, regular rhythm, normal heart sounds, intact distal pulses and normal pulses. Exam reveals no gallop and no friction rub.   No murmur heard.  Pulmonary/Chest: Effort normal and breath sounds normal. No respiratory distress. He has no wheezes. He has no rales. He exhibits no tenderness.   Abdominal: Soft. He exhibits no distension and no mass. There is no tenderness.   Musculoskeletal: He exhibits no edema.   Neurological: He is alert and oriented to person, place, and time.   Skin: Skin is warm and dry. He is not diaphoretic. No pallor.   Psychiatric: He has a normal mood and affect.   Nursing note and vitals reviewed.       Significant Labs:   BMP:   Recent Labs   Lab 20  0900 20  0333   GLU 72 89    137   K 3.9 3.3*   CL 94* 92*   CO2 29 28   BUN 11 18   CREATININE 1.0 1.6*   CALCIUM 9.0 9.1     CBC   Recent Labs   Lab 20  0900 20  0333   WBC 6.21 6.22   HGB 13.2* 13.0*   HCT 41.3 40.3    335       Significant Imagin. TTE (20)  · Low normal left ventricular systolic function. The estimated ejection fraction is 50%.  · Segmental wall motion abnormalities.  · Septal wall has abnormal motion.  · Normal LV diastolic function.  · Normal right ventricular systolic function.  · Mild mitral regurgitation.  · Normal central venous pressure (3 mmHg).  · An atrial septal aneurysm is present.    2. Chest x-ray (20)  Mediastinal structures are midline.  Hilar contours are unremarkable.  Cardiac silhouette is normal in size.  Lungs are over expanded but symmetric.  Subsegmental band like opacity projects over the right middle lung zone, likely atelectasis or scarring.  No consolidation.  No pneumothorax or  pleural effusion.  No free air beneath the diaphragm.  No acute osseous abnormalities.

## 2020-04-21 NOTE — PLAN OF CARE
Pt free of falls/traumas/injuries. Denies c/o SOB; O2Sats remain stable on 2L O2 via NC.  Pt denies any c/o pain or discomfort.  Heparin gtt continues to infuse.  Repeat COVID testing completed; awaiting results.  Plan for C as long as COVID test returns (-) and Creatinine stable.  Pt tolerating plan of care.

## 2020-04-21 NOTE — HOSPITAL COURSE
Patient admitted for NSTEMI and COPD exacerbation. EKG showed deep TWI in V1-V4 consistent with Wellen's pattern. Troponin mildly elevated and flat 0.275 > 0.289 and BNP 1,132. Chest x-ray showed findings consistent with COPD. He was started on ACS protocol. He was loaded with ASA and Brilinta and started on Hep gtt, Lipitor, and Metoprolol. TTE showed LVEF 50%, normal diastolic function, normal RV systolic function, abnormal septal wall motion, atrial septal aneurysm, and CVP 3 mmHg. Pt received Furosemide 80 mg IV x2  due to shortness of breath. Also started on steroids and duonebs for COPD exacerbation with significant improvement in symptoms. He was scheduled to undergo LHC on 4/21/20, however had LACHELLE on AM labs showing Cr up to 1.6. Diuretics were held with plan to postpone LHC until 4/22/20, however renal function improved minimally overnight and cath postponed again.  Today, patient's renal function improved, although still not quite back to baseline. He underwent a left cardiac catheterization via R radial artery which revealed nonobstructive coronary artery disease. He was taken off Brilinta, and continued on low dose Aspirin, Metoprolol, and a high intensity statin. He was continued on Bidil for blood pressure control. The Entresto was not resumed, as his ejection fraction has recovered. His COPD exacerbation resolved, and he was continued on his home COPD medications and continuous home oxygen.

## 2020-04-21 NOTE — ASSESSMENT & PLAN NOTE
- Controlled  - Continue Metoprolol 25 mg bid   - Continue Bidil tid   - Hold home Entresto in setting of LACHELLE

## 2020-04-21 NOTE — PLAN OF CARE
AAOX4. NAD, VSS. No complaints of pain. Fall Protocol maintained. Bed in lowest position with call light and bedside table in reach. Oral hydration provided. Pt ambulates to bathroom without assist. POC discussed with pt. Will continue to monitor.

## 2020-04-21 NOTE — PROGRESS NOTES
Results for JULISSA STOCKTON (MRN [id number]) as of 4/21/2020 08:16   Ref. Range 4/21/2020 03:33   Potassium Latest Ref Range: 3.5 - 5.1 mmol/L 3.3 (L)     K+ low at 3.3.  No scheduled or PRN replacement orders in place.  Dr. Teresa notified; stated he will add replacement.  Awaiting further orders.      Also, pt reporting that he was notified that scheduled LHC for today was cancelled.  Pt requesting breakfast.  Spoke with Dr. Teresa; at this time, still unknown if procedure is set d/t Cr 1.6. Per MD, pt to remain NPO until absolutely sure that procedure is going to be cancelled. Will update pt on plan of care. Will continue to monitor.

## 2020-04-21 NOTE — ASSESSMENT & PLAN NOTE
- No prior history of LHC. CTA during admission 3/2020 showed dense multi-vessel coronary atherosclerosis.  - Elevated Troponin on presentation. EKG with deep TWI in V1-V4 and developed new TWIs in inferior leads (now resolved)  - ACS  Protocol. Loaded with aspirin and ticagrelor. Heparin bolus and gtt. On lipitor 40 mg daily and Metoprolol.   - Cath postponed due to LACHELLE. Plan for tomorrow AM if renal function improved.   - Needs negative COVID test within 48 hours of LHC. Repeat ordered.

## 2020-04-21 NOTE — PROGRESS NOTES
[facility]  Cardiology  Progress Note    Patient Name: Baltazar Mccray  MRN: [id number]  Admission Date: 4/19/2020  Hospital Length of Stay: 2 days  Code Status: Full Code   Attending Physician: Maximiliano Acevedo MD   Primary Care Physician: Rhonda Of Katia  Expected Discharge Date:   Principal Problem:NSTEMI (non-ST elevated myocardial infarction)    Subjective:     Hospital Course:   Patient admitted for NSTEMI and COPD exacerbation. EKG showed deep TWI in V1-V4 consistent with Wellen's pattern. Troponin 0.289 and BNP 1,132. Chest x-ray showed findings consistent with COPD. He was started on ACS protocol. He was loaded with ASA and Brilinta and started on Hep gtt, Lipitor, and Metoprolol. TTE showed LVEF 50%, normal diastolic function, normal RV systolic function, abnormal septal wall motion, atrial septal aneurysm, and CVP 3 mmHg. Pt received Furosemide 80 mg IV x2 due to shortness of breath. Also started on steroids and duonebs for COPD exacerbation with significant improvement in symptoms. He was scheduled to undergo LHC on 4/21/20, however had LACHELLE on AM labs showing Cr up to 1.6. Diuretics were held with plan to postpone LHC until 4/22/20.     Interval History: Feels much better this AM. Denies SOB at rest and chest pain. States he slept lying flat all night. Got up to use the bathroom and had some mild dyspnea. Cath this AM cancelled due to LACHELLE.     Review of Systems   Constitution: Negative for decreased appetite, diaphoresis and malaise/fatigue.   HENT: Negative for nosebleeds.    Eyes: Negative.    Cardiovascular: Positive for dyspnea on exertion. Negative for chest pain, claudication, irregular heartbeat, leg swelling, near-syncope, orthopnea, palpitations, paroxysmal nocturnal dyspnea and syncope.   Respiratory: Positive for shortness of breath. Negative for snoring, sputum production and wheezing.    Hematologic/Lymphatic: Negative for bleeding problem. Does not  bruise/bleed easily.   Skin: Negative.    Musculoskeletal: Negative for falls, muscle cramps and myalgias.   Gastrointestinal: Negative for bloating, abdominal pain, nausea and vomiting.   Neurological: Negative for dizziness, headaches and light-headedness.     Objective:     Vital Signs (Most Recent):  Temp: 98.4 °F (36.9 °C) (04/21/20 1117)  Pulse: 70 (04/21/20 1434)  Resp: 16 (04/21/20 1434)  BP: 106/63 (04/21/20 1117)  SpO2: 98 % (04/21/20 1219) Vital Signs (24h Range):  Temp:  [97.4 °F (36.3 °C)-98.4 °F (36.9 °C)] 98.4 °F (36.9 °C)  Pulse:  [69-95] 70  Resp:  [16-20] 16  SpO2:  [93 %-100 %] 98 %  BP: (106-143)/(63-88) 106/63     Weight: 54.9 kg (121 lb)  Body mass index is 18.4 kg/m².     SpO2: 98 %  O2 Device (Oxygen Therapy): nasal cannula      Intake/Output Summary (Last 24 hours) at 4/21/2020 1441  Last data filed at 4/21/2020 0830  Gross per 24 hour   Intake 240 ml   Output 595 ml   Net -355 ml       Lines/Drains/Airways     Peripheral Intravenous Line                 Peripheral IV - Single Lumen 04/19/20 1104 18 G Right Forearm 2 days         Peripheral IV - Single Lumen 04/19/20 1645 20 G Left Forearm 1 day                Physical Exam   Constitutional: He is oriented to person, place, and time. He appears well-developed and well-nourished. No distress.   HENT:   Head: Normocephalic and atraumatic.   Eyes: Conjunctivae and EOM are normal.   Neck: Neck supple. No JVD present. Carotid bruit is not present.   Cardiovascular: Normal rate, regular rhythm, normal heart sounds, intact distal pulses and normal pulses. Exam reveals no gallop and no friction rub.   No murmur heard.  Pulmonary/Chest: Effort normal and breath sounds normal. No respiratory distress. He has no wheezes. He has no rales. He exhibits no tenderness.   Abdominal: Soft. He exhibits no distension and no mass. There is no tenderness.   Musculoskeletal: He exhibits no edema.   Neurological: He is alert and oriented to person, place, and time.    Skin: Skin is warm and dry. He is not diaphoretic. No pallor.   Psychiatric: He has a normal mood and affect.   Nursing note and vitals reviewed.       Significant Labs:   BMP:   Recent Labs   Lab 20  0900 20  0333   GLU 72 89    137   K 3.9 3.3*   CL 94* 92*   CO2 29 28   BUN 11 18   CREATININE 1.0 1.6*   CALCIUM 9.0 9.1     CBC   Recent Labs   Lab 20  0900 20  0333   WBC 6.21 6.22   HGB 13.2* 13.0*   HCT 41.3 40.3    335       Significant Imagin. TTE (20)  · Low normal left ventricular systolic function. The estimated ejection fraction is 50%.  · Segmental wall motion abnormalities.  · Septal wall has abnormal motion.  · Normal LV diastolic function.  · Normal right ventricular systolic function.  · Mild mitral regurgitation.  · Normal central venous pressure (3 mmHg).  · An atrial septal aneurysm is present.    2. Chest x-ray (20)  Mediastinal structures are midline.  Hilar contours are unremarkable.  Cardiac silhouette is normal in size.  Lungs are over expanded but symmetric.  Subsegmental band like opacity projects over the right middle lung zone, likely atelectasis or scarring.  No consolidation.  No pneumothorax or pleural effusion.  No free air beneath the diaphragm.  No acute osseous abnormalities.    Assessment and Plan:     Brief HPI: 58 y/o male with hx of CHFrEF, COPD, seizure disorder, and HTN presented with acute SOB and found to have elevated troponin and worsening TWI in anterior and inferior leads. Admitted for NSTEMI and COPD exacerbation.     * NSTEMI (non-ST elevated myocardial infarction)  - No prior history of LHC. CTA during admission 3/2020 showed dense multi-vessel coronary atherosclerosis.  - Elevated Troponin on presentation. EKG with deep TWI in V1-V4 and developed new TWIs in inferior leads (now resolved)  - ACS  Protocol. Loaded with aspirin and ticagrelor. Heparin bolus and gtt. On lipitor 40 mg daily and Metoprolol.   - Cath  postponed due to LACHELLE. Plan for tomorrow AM if renal function improved.   - Needs negative COVID test within 48 hours of LHC. Repeat ordered.          Chronic systolic congestive heart failure  - History of HFrEF EF 10% on 3/17/20. Repeat TTE this admission showed LVEF 50%.   - Received IV Lasix 80 mg on 4/19 and 4/20. Now with LACHELLE 2/2 over-diuresis. Euvolemic on exam.  - Continue Metoprolol 25 mg bid  - Hold home Entresto. Need to clarify with patient if he ever took it as outpatient.     COPD (chronic obstructive pulmonary disease)  - Improving. Maintaining oxygen sats >90% on 2L of O2 via NC.   - ABG showed respiratory acidosis. On daily Spiriva and Duo nebs as needed.  - Continue Prednisone 40 mg qd x 5 doses.   - Start Doxy 100 mg bid.       Essential hypertension  - Controlled  - Continue Metoprolol 25 mg bid   - Continue Bidil tid   - Hold home Entresto in setting of LACHELLE      VTE Risk Mitigation (From admission, onward)         Ordered     heparin 25,000 units in dextrose 5% (100 units/ml) IV bolus from bag - ADDITIONAL PRN BOLUS - 60 units/kg (max bolus 4000 units)  As needed (PRN)     Question:  Heparin Infusion Adjustment (DO NOT MODIFY ANSWER)  Answer:  \\[email or web address]\epic\Images\Pharmacy\HeparinInfusions\heparin LOW INTENSITY nomogram for [facility] [id number].pdf    04/19/20 1506     heparin 25,000 units in dextrose 5% (100 units/ml) IV bolus from bag - ADDITIONAL PRN BOLUS - 30 units/kg (max bolus 4000 units)  As needed (PRN)     Question:  Heparin Infusion Adjustment (DO NOT MODIFY ANSWER)  Answer:  \[facility]\epic\Images\Pharmacy\HeparinInfusions\heparin LOW INTENSITY nomogram for [facility] [id number].pdf    04/19/20 1506     heparin 25,000 units in dextrose 5% 250 mL (100 units/mL) infusion LOW INTENSITY nomogram - [facility]  Continuous     Question:  Heparin Infusion Adjustment (DO NOT MODIFY ANSWER)  Answer:  \[email or web address]\epic\Images\Pharmacy\HeparinInfusions\heparin LOW INTENSITY nomogram for [facility] [id number].pdf    04/19/20  1506     Reason for no Mechanical VTE Prophylaxis  Once     Question:  Reasons:  Answer:  IV Heparin within 24 hrs. Pre or Post-Op    04/19/20 1503     IP VTE HIGH RISK PATIENT  Once      04/19/20 1503                Debra Kamara PA-C  Cardiology  [facility]

## 2020-04-21 NOTE — ASSESSMENT & PLAN NOTE
- History of HFrEF EF 10% on 3/17/20. Repeat TTE this admission showed LVEF 50%.   - Received IV Lasix 80 mg on 4/19 and 4/20. Now with LACHELLE 2/2 over-diuresis. Euvolemic on exam.  - Continue Metoprolol 25 mg bid  - Hold home Entresto. Need to clarify with patient if he ever took it as outpatient.

## 2020-04-22 PROBLEM — I50.32 CHRONIC DIASTOLIC CONGESTIVE HEART FAILURE: Status: ACTIVE | Noted: 2020-04-19

## 2020-04-22 PROBLEM — R06.02 SHORTNESS OF BREATH: Status: RESOLVED | Noted: 2020-04-19 | Resolved: 2020-04-22

## 2020-04-22 PROBLEM — N17.9 AKI (ACUTE KIDNEY INJURY): Status: ACTIVE | Noted: 2020-04-22

## 2020-04-22 PROBLEM — J44.1 CHRONIC OBSTRUCTIVE PULMONARY DISEASE WITH ACUTE EXACERBATION: Status: ACTIVE | Noted: 2020-04-19

## 2020-04-22 LAB
ALBUMIN SERPL BCP-MCNC: 3.3 G/DL (ref 3.5–5.2)
ALP SERPL-CCNC: 99 U/L (ref 55–135)
ALT SERPL W/O P-5'-P-CCNC: 35 U/L (ref 10–44)
ANION GAP SERPL CALC-SCNC: 11 MMOL/L (ref 8–16)
APTT BLDCRRT: 42.3 SEC (ref 21–32)
AST SERPL-CCNC: 41 U/L (ref 10–40)
BACTERIA #/AREA URNS AUTO: NORMAL /HPF
BASOPHILS # BLD AUTO: 0.02 K/UL (ref 0–0.2)
BASOPHILS NFR BLD: 0.3 % (ref 0–1.9)
BILIRUB SERPL-MCNC: 0.6 MG/DL (ref 0.1–1)
BILIRUB UR QL STRIP: NEGATIVE
BUN SERPL-MCNC: 23 MG/DL (ref 6–20)
CALCIUM SERPL-MCNC: 9.5 MG/DL (ref 8.7–10.5)
CHLORIDE SERPL-SCNC: 96 MMOL/L (ref 95–110)
CLARITY UR REFRACT.AUTO: CLEAR
CO2 SERPL-SCNC: 27 MMOL/L (ref 23–29)
COLOR UR AUTO: YELLOW
CREAT SERPL-MCNC: 1.5 MG/DL (ref 0.5–1.4)
DIFFERENTIAL METHOD: ABNORMAL
EOSINOPHIL # BLD AUTO: 0 K/UL (ref 0–0.5)
EOSINOPHIL NFR BLD: 0.3 % (ref 0–8)
ERYTHROCYTE [DISTWIDTH] IN BLOOD BY AUTOMATED COUNT: 15.4 % (ref 11.5–14.5)
EST. GFR  (AFRICAN AMERICAN): 58.9 ML/MIN/1.73 M^2
EST. GFR  (NON AFRICAN AMERICAN): 50.9 ML/MIN/1.73 M^2
GLUCOSE SERPL-MCNC: 98 MG/DL (ref 70–110)
GLUCOSE UR QL STRIP: NEGATIVE
HCT VFR BLD AUTO: 37.6 % (ref 40–54)
HGB BLD-MCNC: 12.1 G/DL (ref 14–18)
HGB UR QL STRIP: ABNORMAL
HYALINE CASTS UR QL AUTO: 1 /LPF
IMM GRANULOCYTES # BLD AUTO: 0.05 K/UL (ref 0–0.04)
IMM GRANULOCYTES NFR BLD AUTO: 0.6 % (ref 0–0.5)
KETONES UR QL STRIP: NEGATIVE
LEUKOCYTE ESTERASE UR QL STRIP: NEGATIVE
LYMPHOCYTES # BLD AUTO: 1.4 K/UL (ref 1–4.8)
LYMPHOCYTES NFR BLD: 17.6 % (ref 18–48)
MCH RBC QN AUTO: 27.6 PG (ref 27–31)
MCHC RBC AUTO-ENTMCNC: 32.2 G/DL (ref 32–36)
MCV RBC AUTO: 86 FL (ref 82–98)
MICROSCOPIC COMMENT: NORMAL
MONOCYTES # BLD AUTO: 1 K/UL (ref 0.3–1)
MONOCYTES NFR BLD: 12.5 % (ref 4–15)
NEUTROPHILS # BLD AUTO: 5.3 K/UL (ref 1.8–7.7)
NEUTROPHILS NFR BLD: 68.7 % (ref 38–73)
NITRITE UR QL STRIP: NEGATIVE
NRBC BLD-RTO: 0 /100 WBC
OSMOLALITY SERPL: 288 MOSM/KG (ref 280–300)
OSMOLALITY UR: 450 MOSM/KG (ref 50–1200)
PH UR STRIP: 7 [PH] (ref 5–8)
PLATELET # BLD AUTO: 309 K/UL (ref 150–350)
PMV BLD AUTO: 11.1 FL (ref 9.2–12.9)
POTASSIUM SERPL-SCNC: 4.2 MMOL/L (ref 3.5–5.1)
PROT SERPL-MCNC: 7.2 G/DL (ref 6–8.4)
PROT UR QL STRIP: NEGATIVE
RBC # BLD AUTO: 4.39 M/UL (ref 4.6–6.2)
RBC #/AREA URNS AUTO: 2 /HPF (ref 0–4)
SODIUM SERPL-SCNC: 134 MMOL/L (ref 136–145)
SODIUM UR-SCNC: 38 MMOL/L (ref 20–250)
SP GR UR STRIP: 1.01 (ref 1–1.03)
SQUAMOUS #/AREA URNS AUTO: 0 /HPF
URN SPEC COLLECT METH UR: ABNORMAL
WBC # BLD AUTO: 7.74 K/UL (ref 3.9–12.7)
WBC #/AREA URNS AUTO: 0 /HPF (ref 0–5)

## 2020-04-22 PROCEDURE — 25000242 PHARM REV CODE 250 ALT 637 W/ HCPCS: Performed by: HOSPITALIST

## 2020-04-22 PROCEDURE — 94761 N-INVAS EAR/PLS OXIMETRY MLT: CPT

## 2020-04-22 PROCEDURE — 85730 THROMBOPLASTIN TIME PARTIAL: CPT

## 2020-04-22 PROCEDURE — 63600175 PHARM REV CODE 636 W HCPCS: Performed by: INTERNAL MEDICINE

## 2020-04-22 PROCEDURE — 25000242 PHARM REV CODE 250 ALT 637 W/ HCPCS: Performed by: STUDENT IN AN ORGANIZED HEALTH CARE EDUCATION/TRAINING PROGRAM

## 2020-04-22 PROCEDURE — 85025 COMPLETE CBC W/AUTO DIFF WBC: CPT

## 2020-04-22 PROCEDURE — 93010 ELECTROCARDIOGRAM REPORT: CPT | Mod: ,,, | Performed by: INTERNAL MEDICINE

## 2020-04-22 PROCEDURE — 25000003 PHARM REV CODE 250: Performed by: STUDENT IN AN ORGANIZED HEALTH CARE EDUCATION/TRAINING PROGRAM

## 2020-04-22 PROCEDURE — 99231 PR SUBSEQUENT HOSPITAL CARE,LEVL I: ICD-10-PCS | Mod: ,,, | Performed by: INTERNAL MEDICINE

## 2020-04-22 PROCEDURE — 99900035 HC TECH TIME PER 15 MIN (STAT)

## 2020-04-22 PROCEDURE — 27000221 HC OXYGEN, UP TO 24 HOURS

## 2020-04-22 PROCEDURE — 36415 COLL VENOUS BLD VENIPUNCTURE: CPT

## 2020-04-22 PROCEDURE — 99231 SBSQ HOSP IP/OBS SF/LOW 25: CPT | Mod: ,,, | Performed by: INTERNAL MEDICINE

## 2020-04-22 PROCEDURE — 25000003 PHARM REV CODE 250: Performed by: INTERNAL MEDICINE

## 2020-04-22 PROCEDURE — 20600001 HC STEP DOWN PRIVATE ROOM

## 2020-04-22 PROCEDURE — 84300 ASSAY OF URINE SODIUM: CPT

## 2020-04-22 PROCEDURE — 80053 COMPREHEN METABOLIC PANEL: CPT

## 2020-04-22 PROCEDURE — 83935 ASSAY OF URINE OSMOLALITY: CPT

## 2020-04-22 PROCEDURE — 93010 EKG 12-LEAD: ICD-10-PCS | Mod: ,,, | Performed by: INTERNAL MEDICINE

## 2020-04-22 PROCEDURE — 63600175 PHARM REV CODE 636 W HCPCS: Performed by: HOSPITALIST

## 2020-04-22 PROCEDURE — 94640 AIRWAY INHALATION TREATMENT: CPT

## 2020-04-22 PROCEDURE — 93005 ELECTROCARDIOGRAM TRACING: CPT

## 2020-04-22 PROCEDURE — 81001 URINALYSIS AUTO W/SCOPE: CPT

## 2020-04-22 PROCEDURE — 83930 ASSAY OF BLOOD OSMOLALITY: CPT

## 2020-04-22 RX ORDER — ISOSORBIDE DINITRATE AND HYDRALAZINE HYDROCHLORIDE 37.5; 2 MG/1; MG/1
2 TABLET ORAL 2 TIMES DAILY
Status: DISCONTINUED | OUTPATIENT
Start: 2020-04-22 | End: 2020-04-22

## 2020-04-22 RX ORDER — ISOSORBIDE DINITRATE AND HYDRALAZINE HYDROCHLORIDE 37.5; 2 MG/1; MG/1
1 TABLET ORAL 2 TIMES DAILY
Status: DISCONTINUED | OUTPATIENT
Start: 2020-04-22 | End: 2020-04-23 | Stop reason: HOSPADM

## 2020-04-22 RX ADMIN — HYDRALAZINE HYDROCHLORIDE AND ISOSORBIDE DINITRATE 1 TABLET: 37.5; 2 TABLET, FILM COATED ORAL at 09:04

## 2020-04-22 RX ADMIN — ATORVASTATIN CALCIUM 40 MG: 20 TABLET, FILM COATED ORAL at 08:04

## 2020-04-22 RX ADMIN — METOPROLOL TARTRATE 25 MG: 25 TABLET, FILM COATED ORAL at 09:04

## 2020-04-22 RX ADMIN — TICAGRELOR 90 MG: 90 TABLET ORAL at 08:04

## 2020-04-22 RX ADMIN — TICAGRELOR 90 MG: 90 TABLET ORAL at 09:04

## 2020-04-22 RX ADMIN — IPRATROPIUM BROMIDE AND ALBUTEROL SULFATE 3 ML: .5; 3 SOLUTION RESPIRATORY (INHALATION) at 01:04

## 2020-04-22 RX ADMIN — DOXYCYCLINE HYCLATE 100 MG: 100 TABLET, COATED ORAL at 09:04

## 2020-04-22 RX ADMIN — PREDNISONE 40 MG: 20 TABLET ORAL at 08:04

## 2020-04-22 RX ADMIN — HYDRALAZINE HYDROCHLORIDE AND ISOSORBIDE DINITRATE 1 TABLET: 37.5; 2 TABLET, FILM COATED ORAL at 08:04

## 2020-04-22 RX ADMIN — ASPIRIN 81 MG: 81 TABLET, COATED ORAL at 08:04

## 2020-04-22 RX ADMIN — FLUTICASONE FUROATE AND VILANTEROL TRIFENATATE 1 PUFF: 200; 25 POWDER RESPIRATORY (INHALATION) at 09:04

## 2020-04-22 RX ADMIN — DOXYCYCLINE HYCLATE 100 MG: 100 TABLET, COATED ORAL at 08:04

## 2020-04-22 RX ADMIN — IPRATROPIUM BROMIDE AND ALBUTEROL SULFATE 3 ML: .5; 3 SOLUTION RESPIRATORY (INHALATION) at 07:04

## 2020-04-22 RX ADMIN — HEPARIN SODIUM AND DEXTROSE 18 UNITS/KG/HR: 10000; 5 INJECTION INTRAVENOUS at 04:04

## 2020-04-22 RX ADMIN — TIOTROPIUM BROMIDE 18 MCG: 18 CAPSULE ORAL; RESPIRATORY (INHALATION) at 09:04

## 2020-04-22 NOTE — ASSESSMENT & PLAN NOTE
- Baseline Cr 0.8-0.9.   - Increased to 1.6 yesterday after receiving IV Lasix 80 mg on 4/20 and 4/19. LACHELLE possibly due to over-diuresis. However, labs show minimal improvement despite adequate fluid intake and normal UOP.   - Obtain UA to look for alternative cause of LACHELLE  - Cont to hold nephrotoxic medications  - Cath continues to be postponed due to risk of NITIN.

## 2020-04-22 NOTE — PLAN OF CARE
AAOX4, NAD VSS. Independent Ambulation. Fall protocol implemented/maintained. Bed in lowest position with call light and bedside table in reach. Oriented to room and unit. Comfort measures implemented. NPO at MN for Left Sided Cath Lab procedure today. Surgical bath completed. POC discussed with pt. Will continue to monitor.

## 2020-04-22 NOTE — PHYSICIAN QUERY
"PT Name: Baltazar Mccray  MR #: [id number]    Physician Query Form - Heart  Condition Clarification     CDS/: Cyrus Hernandez Jr, RN               Contact information:[email or web address]  This form is a permanent document in the medical record.     Query Date: April 22, 2020    By submitting this query, we are merely seeking further clarification of documentation. Please utilize your independent clinical judgment when addressing the question(s) below.    The medical record contains the following   Indicators     Supporting Clinical Findings Location in Medical Record   x BNP 1132 4/19 Labs    EF      Radiology findings     x Echo Results History of HFrEF EF 10% on 3/17/20. Repeat TTE this admission showed LVEF 50%.  4/21 Cardiology Progress Note    "Ascites" documented     x "SOB" or "REES" documented presenting today with sudden onset SOB at rest.  He was waiting for the bus and felt acutely sob for which he presented to ER   4/19 H&P    "Hypoxia" documented     x Heart Failure documented Chronic systolic congestive heart failure 4/21 Cardiology Progress Note   x "Edema" documented Denies chest pain or pressure, fever/chills, LE edema   4/19 ED Provider Note   x Diuretics/Meds furosemide injection 80 mg Once    furosemide injection 80 mg Once 4/19 MAR    4/20 MAR     x Treatment: Received IV Lasix 80 mg on 4/19 and 4/20. Now with LACHELLE 2/2 over-diuresis. Euvolemic on exam.   4/21 Cardiology Progress Note    Other:      Heart failure (HF) can be acute, chronic or both. It is generally further specificed as systolic, diastolic, or combined. Lastly, it is important to identify an underlying etiology if known or suspected.     Common clues to acute exacerbation:  Rapidly progressive symptoms (w/in 2 weeks of presentation), using IV diuretics to treat, using supplemental O2, pulmonary edema on Xray, MI w/in 4 weeks, and/or BNP >500    Systolic Heart Failure: is defined as chart documentation of a left ventricular " ejection fraction (LVEF) less than 40%     Diastolic Heart Failure: is defined as a left ventricular ejection fraction (LVEF) greater than 40%   +      Evidence of diastolic dysfunction on echocardiography OR    Right heart catheterization wedge pressure above 12 mm Hg OR    Left heart catheterization left ventricular end diastolic pressure 18 mm Hg or above.    References: *American Heart Association    The clinical guidelines noted below are only system guidelines, and do not replace the providers clinical judgment.     Provider, please specify the diagnosis associated with above clinical findings      Specify the Type and Acuity of Congestive Heart Failure    [   ] Acute on Chronic Systolic Heart Failure- Pre-existing systolic HF diagnosis.  EF < 40%  and acute HF symptoms documented     [   ] Chronic Systolic Heart Failure - Pre-existing systolic HF diagnosis.  EF < 40%  without  acute HF symptoms documented      [x   ] Acute on Chronic Combined Systolic and Diastolic Heart Failure        [   ] Chronic Combined Systolic and Diastolic Heart Failure     [   ] Other Type of Heart Failure (please specify type and acuity ):   [   ] Clinically Undetermined                           Please document in your progress notes daily for the duration of treatment until resolved and include in your discharge summary.

## 2020-04-22 NOTE — PROGRESS NOTES
[facility]  Cardiology  Progress Note    Patient Name: Baltazar Mccray  MRN: [id number]  Admission Date: 4/19/2020  Hospital Length of Stay: 3 days  Code Status: Full Code   Attending Physician: Maximiliano Acevedo MD   Primary Care Physician: Rhonda Of Katia  Expected Discharge Date:   Principal Problem:NSTEMI (non-ST elevated myocardial infarction)    Subjective:     Hospital Course:   Patient admitted for NSTEMI and COPD exacerbation. EKG showed deep TWI in V1-V4 consistent with Wellen's pattern. Troponin mildly elevated and flat 0.275 > 0.289 and BNP 1,132. Chest x-ray showed findings consistent with COPD. He was started on ACS protocol. He was loaded with ASA and Brilinta and started on Hep gtt, Lipitor, and Metoprolol. TTE showed LVEF 50%, normal diastolic function, normal RV systolic function, abnormal septal wall motion, atrial septal aneurysm, and CVP 3 mmHg. Pt received Furosemide 80 mg IV x2  due to shortness of breath. Also started on steroids and duonebs for COPD exacerbation with significant improvement in symptoms. He was scheduled to undergo LHC on 4/21/20, however had LACHELLE on AM labs showing Cr up to 1.6. Diuretics were held with plan to postpone LHC until 4/22/20.       Interval History: NAEO. No complaints at present. AM labs show Cr persistently elevated, now 1.5 (was 1.6 yesterday). His baseline is 0.8-0.9. BUN also elevated. Patient states he drank a lot of water yesterday and was urinating normally. Cath postponed again until tomorrow.     Review of Systems   Constitution: Negative for decreased appetite, diaphoresis and malaise/fatigue.   HENT: Negative for nosebleeds.    Eyes: Negative.    Cardiovascular: Negative for chest pain, claudication, dyspnea on exertion, irregular heartbeat, leg swelling, near-syncope, orthopnea, palpitations, paroxysmal nocturnal dyspnea and syncope.   Respiratory: Negative for cough, shortness of breath, snoring, sputum production and  wheezing.    Hematologic/Lymphatic: Negative for bleeding problem. Does not bruise/bleed easily.   Skin: Negative.    Musculoskeletal: Negative for falls, muscle cramps and myalgias.   Gastrointestinal: Negative for bloating, abdominal pain, nausea and vomiting.   Neurological: Negative for dizziness, headaches and light-headedness.     Objective:     Vital Signs (Most Recent):  Temp: 98.3 °F (36.8 °C) (04/22/20 1201)  Pulse: 76 (04/22/20 1300)  Resp: 19 (04/22/20 1300)  BP: 131/85 (04/22/20 1201)  SpO2: 96 % (04/22/20 1300) Vital Signs (24h Range):  Temp:  [97.8 °F (36.6 °C)-98.9 °F (37.2 °C)] 98.3 °F (36.8 °C)  Pulse:  [62-92] 76  Resp:  [16-20] 19  SpO2:  [93 %-98 %] 96 %  BP: (115-155)/(64-86) 131/85     Weight: 55.8 kg (123 lb 0.3 oz)  Body mass index is 18.7 kg/m².     SpO2: 96 %  O2 Device (Oxygen Therapy): nasal cannula      Intake/Output Summary (Last 24 hours) at 4/22/2020 1330  Last data filed at 4/22/2020 1200  Gross per 24 hour   Intake 1619.3 ml   Output 200 ml   Net 1419.3 ml       Lines/Drains/Airways     Peripheral Intravenous Line                 Peripheral IV - Single Lumen 04/19/20 1104 18 G Right Forearm 3 days         Peripheral IV - Single Lumen 04/19/20 1645 20 G Left Forearm 2 days                Physical Exam   Constitutional: He is oriented to person, place, and time. He appears well-developed and well-nourished. No distress.   HENT:   Head: Normocephalic and atraumatic.   Eyes: Conjunctivae and EOM are normal.   Neck: Neck supple. No JVD present. Carotid bruit is not present.   Cardiovascular: Normal rate, regular rhythm, normal heart sounds, intact distal pulses and normal pulses. Exam reveals no gallop and no friction rub.   No murmur heard.  Pulmonary/Chest: Effort normal and breath sounds normal. No respiratory distress. He has no wheezes. He has no rales. He exhibits no tenderness.   Abdominal: Soft. He exhibits no distension and no mass. There is no tenderness.   Musculoskeletal: He  exhibits no edema.   Neurological: He is alert and oriented to person, place, and time.   Skin: Skin is warm and dry. He is not diaphoretic. No pallor.   Psychiatric: He has a normal mood and affect.   Nursing note and vitals reviewed.       Significant Labs:   BMP:   Recent Labs   Lab 20  0333 20  0354   GLU 89 98    134*   K 3.3* 4.2   CL 92* 96   CO2 28 27   BUN 18 23*   CREATININE 1.6* 1.5*   CALCIUM 9.1 9.5     CBC   Recent Labs   Lab 20  0333 20  0354   WBC 6.22 7.74   HGB 13.0* 12.1*   HCT 40.3 37.6*    309       Significant Imagin. TTE (20)  · Low normal left ventricular systolic function. The estimated ejection fraction is 50%.  · Segmental wall motion abnormalities.  · Septal wall has abnormal motion.  · Normal LV diastolic function.  · Normal right ventricular systolic function.  · Mild mitral regurgitation.  · Normal central venous pressure (3 mmHg).  · An atrial septal aneurysm is present.    2. Chest x-ray (20)  Mediastinal structures are midline. Hilar contours are unremarkable. Cardiac silhouette is normal in size. Lungs are over expanded but symmetric. Subsegmental band like opacity projects over the right middle lung zone, likely atelectasis or scarring. No consolidation. No pneumothorax or pleural effusion. No free air beneath the diaphragm. No acute osseous abnormalities.    Assessment and Plan:     Brief HPI: 58 y/o AAM with PMH of COPD and HFrEF (EF since improved) presented with acute onset SOB and was hypoxic upon arrival. EKG showed worsening TWI in V1-V4 consistent with Wellen's pattern and troponin was elevated at 0.2. He is admitted for NSTEMI and acute COPD exacerbation.      * NSTEMI (non-ST elevated myocardial infarction)  - No prior history of LHC. CTA during admission on 3/2020 showed dense multi-vessel coronary atherosclerosis.  - Elevated Troponin on presentation. EKG with deep TWI in V1-V4 and developed new TWIs in inferior  leads (now resolved)  - Cont daily EKGs. This AM shows improvement in anterior TWIs and resolution of inferior changes.   - ACS  Protocol. Loaded with aspirin and ticagrelor. Heparin bolus and gtt. On lipitor 40 mg daily and Metoprolol.   - Cath postponed again. LACHELLE has not improved much. Plan for tomorrow AM if renal function improved.   - Repeat COVID test negative. Needs negative COVID test within 48 hours of LHC.          LACHELLE (acute kidney injury)  - Baseline Cr 0.8-0.9.   - Increased to 1.6 yesterday after receiving IV Lasix 80 mg on 4/20 and 4/19. LACHELLE possibly due to over-diuresis. However, labs show minimal improvement despite adequate fluid intake and normal UOP.   - Obtain UA to look for alternative cause of LACHELLE  - Cont to hold nephrotoxic medications  - Cath continues to be postponed due to risk of NITIN.    Chronic systolic congestive heart failure  - History of HFrEF EF 10% on 3/17/20. Follow up TTE on 4/4/20 showed improvement to 58%. Repeat TTE this admission showed LVEF low-normal at 50%.   - Euvolemic on exam   - Continue Metoprolol 25 mg bid  - Hold home Entresto. Need to clarify with patient if he ever even took it as outpatient.       Chronic obstructive pulmonary disease with acute exacerbation  - On 2L of O2 at home. On arrival, tachypnic and satting 79% on room air  - Exacerbation resolved. Maintaining oxygen sats >90% on 1L of O2 via NC.   - ABG showed respiratory acidosis. On daily Spiriva and Duo nebs as needed.  - Continue Prednisone 40 mg qd x 5 doses.   - Continue Doxy 100 mg bid.       Essential hypertension  - Controlled  - Continue Metoprolol 25 mg bid   - Continue Bidil tid         VTE Risk Mitigation (From admission, onward)         Ordered     heparin 25,000 units in dextrose 5% (100 units/ml) IV bolus from bag - ADDITIONAL PRN BOLUS - 60 units/kg (max bolus 4000 units)  As needed (PRN)     Question:  Heparin Infusion Adjustment (DO NOT MODIFY ANSWER)  Answer:   \\[email or web address]\epic\Images\Pharmacy\HeparinInfusions\heparin LOW INTENSITY nomogram for [facility] [id number]    04/19/20 1506     heparin 25,000 units in dextrose 5% (100 units/ml) IV bolus from bag - ADDITIONAL PRN BOLUS - 30 units/kg (max bolus 4000 units)  As needed (PRN)     Question:  Heparin Infusion Adjustment (DO NOT MODIFY ANSWER)  Answer:  \\[email or web address]\epic\Images\Pharmacy\HeparinInfusions\heparin LOW INTENSITY nomogram for [facility] [id number].pdf    04/19/20 1506     heparin 25,000 units in dextrose 5% 250 mL (100 units/mL) infusion LOW INTENSITY nomogram - [facility]  Continuous     Question:  Heparin Infusion Adjustment (DO NOT MODIFY ANSWER)  Answer:  \[email or web address]\epic\Images\Pharmacy\HeparinInfusions\heparin LOW INTENSITY nomogram for [facility] [id number].pdf    04/19/20 1506     Reason for no Mechanical VTE Prophylaxis  Once     Question:  Reasons:  Answer:  IV Heparin within 24 hrs. Pre or Post-Op    04/19/20 1503     IP VTE HIGH RISK PATIENT  Once      04/19/20 1503                Debra Kamara PA-C  Cardiology  [facility]

## 2020-04-22 NOTE — ASSESSMENT & PLAN NOTE
- On 2L of O2 at home. On arrival, tachypnic and satting 79% on room air  - Exacerbation resolved. Maintaining oxygen sats >90% on 1L of O2 via NC.   - ABG showed respiratory acidosis. On daily Spiriva and Duo nebs as needed.  - Continue Prednisone 40 mg qd x 5 doses.   - Continue Doxy 100 mg bid.

## 2020-04-22 NOTE — ASSESSMENT & PLAN NOTE
- No prior history of C. CTA during admission on 3/2020 showed dense multi-vessel coronary atherosclerosis.  - Elevated Troponin on presentation. EKG with deep TWI in V1-V4 and developed new TWIs in inferior leads (now resolved)  - Cont daily EKGs. This AM shows improvement in anterior TWIs and resolution of inferior changes.   - ACS  Protocol. Loaded with aspirin and ticagrelor. Heparin bolus and gtt. On lipitor 40 mg daily and Metoprolol.   - Cath postponed again. LACHELLE has not improved much. Plan for tomorrow AM if renal function improved.   - Repeat COVID test negative. Needs negative COVID test within 48 hours of C.

## 2020-04-22 NOTE — SUBJECTIVE & OBJECTIVE
Interval History: NAEO. No complaints at present. AM labs show Cr persistently elevated, now 1.5 (was 1.6 yesterday). His baseline is 0.8-0.9. BUN also elevated. Patient states he drank a lot of water yesterday and was urinating normally. Cath postponed again until tomorrow.     Review of Systems   Constitution: Negative for decreased appetite, diaphoresis and malaise/fatigue.   HENT: Negative for nosebleeds.    Eyes: Negative.    Cardiovascular: Negative for chest pain, claudication, dyspnea on exertion, irregular heartbeat, leg swelling, near-syncope, orthopnea, palpitations, paroxysmal nocturnal dyspnea and syncope.   Respiratory: Negative for cough, shortness of breath, snoring, sputum production and wheezing.    Hematologic/Lymphatic: Negative for bleeding problem. Does not bruise/bleed easily.   Skin: Negative.    Musculoskeletal: Negative for falls, muscle cramps and myalgias.   Gastrointestinal: Negative for bloating, abdominal pain, nausea and vomiting.   Neurological: Negative for dizziness, headaches and light-headedness.     Objective:     Vital Signs (Most Recent):  Temp: 98.3 °F (36.8 °C) (04/22/20 1201)  Pulse: 76 (04/22/20 1300)  Resp: 19 (04/22/20 1300)  BP: 131/85 (04/22/20 1201)  SpO2: 96 % (04/22/20 1300) Vital Signs (24h Range):  Temp:  [97.8 °F (36.6 °C)-98.9 °F (37.2 °C)] 98.3 °F (36.8 °C)  Pulse:  [62-92] 76  Resp:  [16-20] 19  SpO2:  [93 %-98 %] 96 %  BP: (115-155)/(64-86) 131/85     Weight: 55.8 kg (123 lb 0.3 oz)  Body mass index is 18.7 kg/m².     SpO2: 96 %  O2 Device (Oxygen Therapy): nasal cannula      Intake/Output Summary (Last 24 hours) at 4/22/2020 1330  Last data filed at 4/22/2020 1200  Gross per 24 hour   Intake 1619.3 ml   Output 200 ml   Net 1419.3 ml       Lines/Drains/Airways     Peripheral Intravenous Line                 Peripheral IV - Single Lumen 04/19/20 1104 18 G Right Forearm 3 days         Peripheral IV - Single Lumen 04/19/20 1645 20 G Left Forearm 2 days                 Physical Exam   Constitutional: He is oriented to person, place, and time. He appears well-developed and well-nourished. No distress.   HENT:   Head: Normocephalic and atraumatic.   Eyes: Conjunctivae and EOM are normal.   Neck: Neck supple. No JVD present. Carotid bruit is not present.   Cardiovascular: Normal rate, regular rhythm, normal heart sounds, intact distal pulses and normal pulses. Exam reveals no gallop and no friction rub.   No murmur heard.  Pulmonary/Chest: Effort normal and breath sounds normal. No respiratory distress. He has no wheezes. He has no rales. He exhibits no tenderness.   Abdominal: Soft. He exhibits no distension and no mass. There is no tenderness.   Musculoskeletal: He exhibits no edema.   Neurological: He is alert and oriented to person, place, and time.   Skin: Skin is warm and dry. He is not diaphoretic. No pallor.   Psychiatric: He has a normal mood and affect.   Nursing note and vitals reviewed.       Significant Labs:   BMP:   Recent Labs   Lab 20  0333 20  0354   GLU 89 98    134*   K 3.3* 4.2   CL 92* 96   CO2 28 27   BUN 18 23*   CREATININE 1.6* 1.5*   CALCIUM 9.1 9.5     CBC   Recent Labs   Lab 20  0333 20  0354   WBC 6.22 7.74   HGB 13.0* 12.1*   HCT 40.3 37.6*    309       Significant Imagin. TTE (20)  · Low normal left ventricular systolic function. The estimated ejection fraction is 50%.  · Segmental wall motion abnormalities.  · Septal wall has abnormal motion.  · Normal LV diastolic function.  · Normal right ventricular systolic function.  · Mild mitral regurgitation.  · Normal central venous pressure (3 mmHg).  · An atrial septal aneurysm is present.    2. Chest x-ray (20)  Mediastinal structures are midline. Hilar contours are unremarkable. Cardiac silhouette is normal in size. Lungs are over expanded but symmetric. Subsegmental band like opacity projects over the right middle lung zone, likely atelectasis or  scarring. No consolidation. No pneumothorax or pleural effusion. No free air beneath the diaphragm. No acute osseous abnormalities.

## 2020-04-22 NOTE — ASSESSMENT & PLAN NOTE
- History of HFrEF EF 10% on 3/17/20. Follow up TTE on 4/4/20 showed improvement to 58%. Repeat TTE this admission showed LVEF low-normal at 50%.   - Euvolemic on exam   - Continue Metoprolol 25 mg bid  - Hold home Entresto. Need to clarify with patient if he ever even took it as outpatient.

## 2020-04-22 NOTE — PLAN OF CARE
Plan of care discussed with patient.  Patient  is oriented x 4, able to follow commands, fall precautions in place no falls or injury this shift.Patient has no c/o of pain. 1500 ml FL, Tolerates meds whole, iv site c/d/I, heels floated,  encouraged to reposition q2h, skin c/d/I,  will cont to monitor

## 2020-04-23 VITALS
SYSTOLIC BLOOD PRESSURE: 153 MMHG | DIASTOLIC BLOOD PRESSURE: 82 MMHG | HEART RATE: 77 BPM | WEIGHT: 121.94 LBS | HEIGHT: 68 IN | OXYGEN SATURATION: 98 % | RESPIRATION RATE: 18 BRPM | TEMPERATURE: 99 F | BODY MASS INDEX: 18.48 KG/M2

## 2020-04-23 DIAGNOSIS — I21.4 NSTEMI (NON-ST ELEVATION MYOCARDIAL INFARCTION): Primary | ICD-10-CM

## 2020-04-23 PROBLEM — J44.1 CHRONIC OBSTRUCTIVE PULMONARY DISEASE WITH ACUTE EXACERBATION: Status: RESOLVED | Noted: 2020-04-19 | Resolved: 2020-04-23

## 2020-04-23 PROBLEM — I25.10 NONOBSTRUCTIVE ATHEROSCLEROSIS OF CORONARY ARTERY: Status: ACTIVE | Noted: 2020-04-23

## 2020-04-23 PROBLEM — I50.42 CHRONIC COMBINED SYSTOLIC AND DIASTOLIC CONGESTIVE HEART FAILURE: Status: ACTIVE | Noted: 2020-04-19

## 2020-04-23 LAB
ABO + RH BLD: NORMAL
ALBUMIN SERPL BCP-MCNC: 3.2 G/DL (ref 3.5–5.2)
ALP SERPL-CCNC: 89 U/L (ref 55–135)
ALT SERPL W/O P-5'-P-CCNC: 39 U/L (ref 10–44)
ANION GAP SERPL CALC-SCNC: 10 MMOL/L (ref 8–16)
ANION GAP SERPL CALC-SCNC: 13 MMOL/L (ref 8–16)
APTT BLDCRRT: 42.4 SEC (ref 21–32)
AST SERPL-CCNC: 46 U/L (ref 10–40)
BASOPHILS # BLD AUTO: 0.02 K/UL (ref 0–0.2)
BASOPHILS NFR BLD: 0.3 % (ref 0–1.9)
BILIRUB SERPL-MCNC: 0.4 MG/DL (ref 0.1–1)
BLD GP AB SCN CELLS X3 SERPL QL: NORMAL
BUN SERPL-MCNC: 19 MG/DL (ref 6–20)
BUN SERPL-MCNC: 23 MG/DL (ref 6–20)
CALCIUM SERPL-MCNC: 8.7 MG/DL (ref 8.7–10.5)
CALCIUM SERPL-MCNC: 8.7 MG/DL (ref 8.7–10.5)
CHLORIDE SERPL-SCNC: 100 MMOL/L (ref 95–110)
CHLORIDE SERPL-SCNC: 102 MMOL/L (ref 95–110)
CO2 SERPL-SCNC: 23 MMOL/L (ref 23–29)
CO2 SERPL-SCNC: 28 MMOL/L (ref 23–29)
CREAT SERPL-MCNC: 1.2 MG/DL (ref 0.5–1.4)
CREAT SERPL-MCNC: 1.3 MG/DL (ref 0.5–1.4)
DIFFERENTIAL METHOD: ABNORMAL
EOSINOPHIL # BLD AUTO: 0 K/UL (ref 0–0.5)
EOSINOPHIL NFR BLD: 0.1 % (ref 0–8)
ERYTHROCYTE [DISTWIDTH] IN BLOOD BY AUTOMATED COUNT: 15.7 % (ref 11.5–14.5)
EST. GFR  (AFRICAN AMERICAN): >60 ML/MIN/1.73 M^2
EST. GFR  (AFRICAN AMERICAN): >60 ML/MIN/1.73 M^2
EST. GFR  (NON AFRICAN AMERICAN): >60 ML/MIN/1.73 M^2
EST. GFR  (NON AFRICAN AMERICAN): >60 ML/MIN/1.73 M^2
GLUCOSE SERPL-MCNC: 84 MG/DL (ref 70–110)
GLUCOSE SERPL-MCNC: 97 MG/DL (ref 70–110)
HCT VFR BLD AUTO: 37.9 % (ref 40–54)
HGB BLD-MCNC: 12 G/DL (ref 14–18)
IMM GRANULOCYTES # BLD AUTO: 0.05 K/UL (ref 0–0.04)
IMM GRANULOCYTES NFR BLD AUTO: 0.6 % (ref 0–0.5)
LYMPHOCYTES # BLD AUTO: 1.2 K/UL (ref 1–4.8)
LYMPHOCYTES NFR BLD: 15.7 % (ref 18–48)
MCH RBC QN AUTO: 27.7 PG (ref 27–31)
MCHC RBC AUTO-ENTMCNC: 31.7 G/DL (ref 32–36)
MCV RBC AUTO: 88 FL (ref 82–98)
MONOCYTES # BLD AUTO: 0.7 K/UL (ref 0.3–1)
MONOCYTES NFR BLD: 9.3 % (ref 4–15)
NEUTROPHILS # BLD AUTO: 5.8 K/UL (ref 1.8–7.7)
NEUTROPHILS NFR BLD: 74 % (ref 38–73)
NRBC BLD-RTO: 0 /100 WBC
PLATELET # BLD AUTO: 313 K/UL (ref 150–350)
PMV BLD AUTO: 10.7 FL (ref 9.2–12.9)
POTASSIUM SERPL-SCNC: 4.2 MMOL/L (ref 3.5–5.1)
POTASSIUM SERPL-SCNC: 4.3 MMOL/L (ref 3.5–5.1)
PROT SERPL-MCNC: 7 G/DL (ref 6–8.4)
RBC # BLD AUTO: 4.33 M/UL (ref 4.6–6.2)
SARS-COV-2 RNA RESP QL NAA+PROBE: NOT DETECTED
SODIUM SERPL-SCNC: 138 MMOL/L (ref 136–145)
SODIUM SERPL-SCNC: 138 MMOL/L (ref 136–145)
WBC # BLD AUTO: 7.82 K/UL (ref 3.9–12.7)

## 2020-04-23 PROCEDURE — 25000242 PHARM REV CODE 250 ALT 637 W/ HCPCS: Performed by: HOSPITALIST

## 2020-04-23 PROCEDURE — 85025 COMPLETE CBC W/AUTO DIFF WBC: CPT

## 2020-04-23 PROCEDURE — 94640 AIRWAY INHALATION TREATMENT: CPT

## 2020-04-23 PROCEDURE — C1894 INTRO/SHEATH, NON-LASER: HCPCS | Performed by: INTERNAL MEDICINE

## 2020-04-23 PROCEDURE — C1769 GUIDE WIRE: HCPCS | Performed by: INTERNAL MEDICINE

## 2020-04-23 PROCEDURE — 27000221 HC OXYGEN, UP TO 24 HOURS

## 2020-04-23 PROCEDURE — 25000003 PHARM REV CODE 250: Performed by: INTERNAL MEDICINE

## 2020-04-23 PROCEDURE — 63600175 PHARM REV CODE 636 W HCPCS: Performed by: INTERNAL MEDICINE

## 2020-04-23 PROCEDURE — 93458 PR CATH PLACE/CORON ANGIO, IMG SUPER/INTERP,W LEFT HEART VENTRICULOGRAPHY: ICD-10-PCS | Mod: 26,,, | Performed by: INTERNAL MEDICINE

## 2020-04-23 PROCEDURE — 86850 RBC ANTIBODY SCREEN: CPT

## 2020-04-23 PROCEDURE — 80053 COMPREHEN METABOLIC PANEL: CPT

## 2020-04-23 PROCEDURE — 99152 PR MOD CONSCIOUS SEDATION, SAME PHYS, 5+ YRS, FIRST 15 MIN: ICD-10-PCS | Mod: ,,, | Performed by: INTERNAL MEDICINE

## 2020-04-23 PROCEDURE — 93458 L HRT ARTERY/VENTRICLE ANGIO: CPT | Mod: 26,,, | Performed by: INTERNAL MEDICINE

## 2020-04-23 PROCEDURE — 93010 ELECTROCARDIOGRAM REPORT: CPT | Mod: ,,, | Performed by: INTERNAL MEDICINE

## 2020-04-23 PROCEDURE — 63600175 PHARM REV CODE 636 W HCPCS: Performed by: HOSPITALIST

## 2020-04-23 PROCEDURE — 93005 ELECTROCARDIOGRAM TRACING: CPT

## 2020-04-23 PROCEDURE — 25000242 PHARM REV CODE 250 ALT 637 W/ HCPCS: Performed by: STUDENT IN AN ORGANIZED HEALTH CARE EDUCATION/TRAINING PROGRAM

## 2020-04-23 PROCEDURE — 99152 MOD SED SAME PHYS/QHP 5/>YRS: CPT | Mod: ,,, | Performed by: INTERNAL MEDICINE

## 2020-04-23 PROCEDURE — 80048 BASIC METABOLIC PNL TOTAL CA: CPT

## 2020-04-23 PROCEDURE — U0002 COVID-19 LAB TEST NON-CDC: HCPCS

## 2020-04-23 PROCEDURE — 85730 THROMBOPLASTIN TIME PARTIAL: CPT

## 2020-04-23 PROCEDURE — C1887 CATHETER, GUIDING: HCPCS | Performed by: INTERNAL MEDICINE

## 2020-04-23 PROCEDURE — 99239 PR HOSPITAL DISCHARGE DAY,>30 MIN: ICD-10-PCS | Mod: ,,, | Performed by: INTERNAL MEDICINE

## 2020-04-23 PROCEDURE — 93010 EKG 12-LEAD: ICD-10-PCS | Mod: ,,, | Performed by: INTERNAL MEDICINE

## 2020-04-23 PROCEDURE — 25000003 PHARM REV CODE 250: Performed by: STUDENT IN AN ORGANIZED HEALTH CARE EDUCATION/TRAINING PROGRAM

## 2020-04-23 PROCEDURE — 93458 L HRT ARTERY/VENTRICLE ANGIO: CPT | Performed by: INTERNAL MEDICINE

## 2020-04-23 PROCEDURE — 94761 N-INVAS EAR/PLS OXIMETRY MLT: CPT

## 2020-04-23 PROCEDURE — 99239 HOSP IP/OBS DSCHRG MGMT >30: CPT | Mod: ,,, | Performed by: INTERNAL MEDICINE

## 2020-04-23 PROCEDURE — 99900035 HC TECH TIME PER 15 MIN (STAT)

## 2020-04-23 PROCEDURE — 36415 COLL VENOUS BLD VENIPUNCTURE: CPT

## 2020-04-23 PROCEDURE — 25500020 PHARM REV CODE 255: Performed by: INTERNAL MEDICINE

## 2020-04-23 RX ORDER — DIPHENHYDRAMINE HCL 50 MG
CAPSULE ORAL
Status: DISCONTINUED | OUTPATIENT
Start: 2020-04-23 | End: 2020-04-23

## 2020-04-23 RX ORDER — ASPIRIN 81 MG/1
81 TABLET ORAL DAILY
Qty: 30 TABLET | Refills: 0 | Status: SHIPPED | OUTPATIENT
Start: 2020-04-24 | End: 2020-04-23

## 2020-04-23 RX ORDER — PREDNISONE 20 MG/1
40 TABLET ORAL DAILY
Qty: 4 TABLET | Refills: 0 | Status: SHIPPED | OUTPATIENT
Start: 2020-04-24 | End: 2020-05-06 | Stop reason: SDUPTHER

## 2020-04-23 RX ORDER — PREDNISONE 20 MG/1
40 TABLET ORAL DAILY
Qty: 2 TABLET | Refills: 0 | Status: CANCELLED | OUTPATIENT
Start: 2020-04-24

## 2020-04-23 RX ORDER — TIOTROPIUM BROMIDE 18 UG/1
18 CAPSULE ORAL; RESPIRATORY (INHALATION) DAILY
Qty: 30 CAPSULE | Refills: 2 | Status: ON HOLD | OUTPATIENT
Start: 2020-04-23 | End: 2020-06-06 | Stop reason: SDUPTHER

## 2020-04-23 RX ORDER — ATORVASTATIN CALCIUM 40 MG/1
40 TABLET, FILM COATED ORAL NIGHTLY
Qty: 30 TABLET | Refills: 0 | Status: CANCELLED | OUTPATIENT
Start: 2020-04-23 | End: 2020-05-23

## 2020-04-23 RX ORDER — ATORVASTATIN CALCIUM 40 MG/1
40 TABLET, FILM COATED ORAL DAILY
Qty: 30 TABLET | Refills: 2 | Status: ON HOLD | OUTPATIENT
Start: 2020-04-24 | End: 2021-05-13

## 2020-04-23 RX ORDER — HEPARIN SODIUM 200 [USP'U]/100ML
INJECTION, SOLUTION INTRAVENOUS
Status: DISCONTINUED | OUTPATIENT
Start: 2020-04-23 | End: 2020-04-23

## 2020-04-23 RX ORDER — SODIUM CHLORIDE 9 MG/ML
INJECTION, SOLUTION INTRAVENOUS CONTINUOUS
Status: DISCONTINUED | OUTPATIENT
Start: 2020-04-23 | End: 2020-04-23 | Stop reason: HOSPADM

## 2020-04-23 RX ORDER — ISOSORBIDE DINITRATE AND HYDRALAZINE HYDROCHLORIDE 37.5; 2 MG/1; MG/1
1 TABLET ORAL 2 TIMES DAILY
Qty: 60 TABLET | Refills: 2 | Status: SHIPPED | OUTPATIENT
Start: 2020-04-23 | End: 2020-12-22

## 2020-04-23 RX ORDER — ASPIRIN 81 MG/1
81 TABLET ORAL DAILY
Qty: 30 TABLET | Refills: 2 | Status: SHIPPED | OUTPATIENT
Start: 2020-04-24 | End: 2020-04-23

## 2020-04-23 RX ORDER — OMEPRAZOLE 20 MG/1
20 CAPSULE, DELAYED RELEASE ORAL DAILY
Qty: 30 CAPSULE | Refills: 2 | Status: ON HOLD | OUTPATIENT
Start: 2020-04-23 | End: 2021-05-02 | Stop reason: HOSPADM

## 2020-04-23 RX ORDER — METOPROLOL TARTRATE 25 MG/1
25 TABLET, FILM COATED ORAL 2 TIMES DAILY
Qty: 60 TABLET | Refills: 2 | Status: SHIPPED | OUTPATIENT
Start: 2020-04-23 | End: 2020-12-22

## 2020-04-23 RX ORDER — MIDAZOLAM HYDROCHLORIDE 1 MG/ML
INJECTION, SOLUTION INTRAMUSCULAR; INTRAVENOUS
Status: DISCONTINUED | OUTPATIENT
Start: 2020-04-23 | End: 2020-04-23

## 2020-04-23 RX ORDER — LIDOCAINE HYDROCHLORIDE 20 MG/ML
INJECTION, SOLUTION INFILTRATION; PERINEURAL
Status: DISCONTINUED | OUTPATIENT
Start: 2020-04-23 | End: 2020-04-23

## 2020-04-23 RX ORDER — HEPARIN SODIUM 1000 [USP'U]/ML
INJECTION, SOLUTION INTRAVENOUS; SUBCUTANEOUS
Status: DISCONTINUED | OUTPATIENT
Start: 2020-04-23 | End: 2020-04-23

## 2020-04-23 RX ORDER — ALBUTEROL SULFATE 90 UG/1
2 AEROSOL, METERED RESPIRATORY (INHALATION) EVERY 6 HOURS PRN
Qty: 8.5 G | Refills: 2 | Status: SHIPPED | OUTPATIENT
Start: 2020-04-23 | End: 2020-05-06 | Stop reason: SDUPTHER

## 2020-04-23 RX ORDER — PREDNISONE 20 MG/1
40 TABLET ORAL DAILY
Qty: 2 TABLET | Refills: 0 | Status: SHIPPED | OUTPATIENT
Start: 2020-04-24 | End: 2020-04-23

## 2020-04-23 RX ORDER — METOPROLOL TARTRATE 25 MG/1
25 TABLET, FILM COATED ORAL 2 TIMES DAILY
Qty: 60 TABLET | Refills: 0 | Status: SHIPPED | OUTPATIENT
Start: 2020-04-23 | End: 2020-04-23

## 2020-04-23 RX ORDER — DOXYCYCLINE HYCLATE 100 MG
100 TABLET ORAL EVERY 12 HOURS
Qty: 5 TABLET | Refills: 0 | OUTPATIENT
Start: 2020-04-23 | End: 2020-05-06

## 2020-04-23 RX ORDER — ASPIRIN 81 MG/1
81 TABLET ORAL DAILY
Qty: 30 TABLET | Refills: 11 | Status: ON HOLD | OUTPATIENT
Start: 2020-04-24 | End: 2021-05-13

## 2020-04-23 RX ORDER — FENTANYL CITRATE 50 UG/ML
INJECTION, SOLUTION INTRAMUSCULAR; INTRAVENOUS
Status: DISCONTINUED | OUTPATIENT
Start: 2020-04-23 | End: 2020-04-23

## 2020-04-23 RX ORDER — ASPIRIN 81 MG/1
81 TABLET ORAL DAILY
Qty: 30 TABLET | Refills: 0 | Status: CANCELLED | OUTPATIENT
Start: 2020-04-24 | End: 2020-05-24

## 2020-04-23 RX ORDER — DOXYCYCLINE HYCLATE 100 MG
100 TABLET ORAL EVERY 12 HOURS
Qty: 5 TABLET | Refills: 0 | Status: CANCELLED | OUTPATIENT
Start: 2020-04-23

## 2020-04-23 RX ORDER — ATORVASTATIN CALCIUM 40 MG/1
40 TABLET, FILM COATED ORAL DAILY
Qty: 30 TABLET | Refills: 0 | Status: SHIPPED | OUTPATIENT
Start: 2020-04-24 | End: 2020-04-23

## 2020-04-23 RX ORDER — ONDANSETRON 8 MG/1
8 TABLET, ORALLY DISINTEGRATING ORAL EVERY 8 HOURS PRN
Status: DISCONTINUED | OUTPATIENT
Start: 2020-04-23 | End: 2020-04-23 | Stop reason: HOSPADM

## 2020-04-23 RX ORDER — FLUTICASONE PROPIONATE AND SALMETEROL XINAFOATE 45; 21 UG/1; UG/1
2 AEROSOL, METERED RESPIRATORY (INHALATION) EVERY 12 HOURS
Qty: 12 G | Refills: 2 | Status: ON HOLD | OUTPATIENT
Start: 2020-04-23 | End: 2020-06-06 | Stop reason: SDUPTHER

## 2020-04-23 RX ORDER — SODIUM CHLORIDE 9 MG/ML
INJECTION, SOLUTION INTRAVENOUS
Status: DISCONTINUED | OUTPATIENT
Start: 2020-04-23 | End: 2020-04-23

## 2020-04-23 RX ADMIN — TIOTROPIUM BROMIDE 18 MCG: 18 CAPSULE ORAL; RESPIRATORY (INHALATION) at 08:04

## 2020-04-23 RX ADMIN — HEPARIN SODIUM AND DEXTROSE 18 UNITS/KG/HR: 10000; 5 INJECTION INTRAVENOUS at 04:04

## 2020-04-23 RX ADMIN — PREDNISONE 40 MG: 20 TABLET ORAL at 08:04

## 2020-04-23 RX ADMIN — FLUTICASONE FUROATE AND VILANTEROL TRIFENATATE 1 PUFF: 200; 25 POWDER RESPIRATORY (INHALATION) at 08:04

## 2020-04-23 RX ADMIN — DOXYCYCLINE HYCLATE 100 MG: 100 TABLET, COATED ORAL at 08:04

## 2020-04-23 RX ADMIN — TICAGRELOR 90 MG: 90 TABLET ORAL at 08:04

## 2020-04-23 RX ADMIN — METOPROLOL TARTRATE 25 MG: 25 TABLET, FILM COATED ORAL at 08:04

## 2020-04-23 RX ADMIN — HYDRALAZINE HYDROCHLORIDE AND ISOSORBIDE DINITRATE 1 TABLET: 37.5; 2 TABLET, FILM COATED ORAL at 08:04

## 2020-04-23 RX ADMIN — IPRATROPIUM BROMIDE AND ALBUTEROL SULFATE 3 ML: .5; 3 SOLUTION RESPIRATORY (INHALATION) at 07:04

## 2020-04-23 RX ADMIN — ATORVASTATIN CALCIUM 40 MG: 20 TABLET, FILM COATED ORAL at 08:04

## 2020-04-23 RX ADMIN — ASPIRIN 81 MG: 81 TABLET, COATED ORAL at 08:04

## 2020-04-23 RX ADMIN — IPRATROPIUM BROMIDE AND ALBUTEROL SULFATE 3 ML: .5; 3 SOLUTION RESPIRATORY (INHALATION) at 01:04

## 2020-04-23 NOTE — ASSESSMENT & PLAN NOTE
Smoking Cessation:  -Pt currently smoking   PPD for the past  years  -After counseling, pt interested in quitting today  -Referred to tobacco cessation counseling today  -Consider Wellbutrin 50mg and Nicotine patch will defer this to PCP

## 2020-04-23 NOTE — ASSESSMENT & PLAN NOTE
- Maintaining oxygen saturations > 90% on 1-2L of oxygen via nasal canula. The patient is supposed to be on oxygen 24/7 at home, but has only been using it at night. Discussed proper instructions with patient.    - Continue home COPD medications: Spiriva, Combivent, Advair, Atrovent  - Finish five day course of Doxycycline and Prednisone for COPD exacerbation  - Refer to Pulmonology and Cardiology as outpatient  - Follow up with PCP in one week

## 2020-04-23 NOTE — PLAN OF CARE
Called patient in his room per request. Asked if I could help with disability. Explained I couldn't help him, but could tell him where to start. He stated he had already been working with a  and had tried 5 or 6 times to apply with no success. Explained many people have to apply more than once to get covered and encouraged him to keep working with his . Also explained that because of the Covid, things might take longer than usual. Pt verbalizes understanding.      I am aware that patient lives close to the hospital. I asked him to let me call him a Lyft to get home since he had a procedure this morning and the weather is bad. He stated he wanted to take the bus since he has some business to attend to before he goes home. I stated that if he changes his mind, to let his nurse know and I could provide transportation for him.    Julie Haase RN  Case Management [phone]     EYLEA AND REPEAT EVERY 6 WKS X 3 - TRACED SRF AT 6 WKS - DOING BETTER THAN AT 8 WKS ON 4 10 18 - REVIEWED RISK OF INFLAMMATION AND PATIENT ACCEPTS RISK AND WANTS TO STICK WITH EYLEA AS SHE DOES NOT WANT TO TAKE CHANGE THAT SHE WOULD REQUIRE MORE FREQUENT TX WITH ANOTHER MEDICATION.

## 2020-04-23 NOTE — DISCHARGE INSTRUCTIONS
You underwent a cardiac catheterization which did not show any blockages in the arteries to your heart. Your EKG changes were likely due to a lack of oxygen in your blood from your COPD. You will need to follow up with your primary care doctor, as well as establish care with cardiology and pulmonology when you are discharged. Please review your medication list, because there have been changes. Also read and follow the instructions below:    Discharge Instructions and Care of Your Wrist After a Cardiac Catheterization Procedure Performed via the Radial Artery    Wrist Care:    The dressing (band-aid) on the puncture site may be removed after 24 hours and left open to air.  If there is minor oozing, you may apply another Band-aid and remove after 12 hours. Keep the site clean and dry.   You may shower on the day following your procedure.  Do not take a tub bath or submerge the puncture site in water for 5 days following the procedure    If bleeding should occur following discharge:   Sit down and apply firm pressure to the puncture site with your fingers for 10 minutes    If the bleeding stops, continue to sit quietly, keeping your wrist straight for 2 hours. Notify your physician as soon as possible    If bleeding does not stop after 10 minutes or if there is a large amount of bleeding or spurting, call 911 immediately.  Do not drive yourself to the hospital.     Wrist site healing:   The healing wrist site should be soft and dry. A bruise may be present. You should expect mild tingling in your hand and tenderness at the puncture site for up to 3 days. Please contact your doctor or the Cardiac Cath Lab doctor if any of the following signs appear:  · Redness, heat, or pus at the puncture site.  · Change in color or temperature of the hand or arm.  ·       A lump at the puncture site that enlarges or is larger than marble size.  · Chills or fever greater than 101.0 F    Activities:  While the wrist is healing,  bleeding or swelling can occur as a result of stress or strain to the wrist. Carefully follow these guidelines:  · On the day of discharge, limit your activities. Do not subject your affected hand/arm to any forceful movements (i.e. supporting weight when rising from a chair or bed).  · Do not drive for 24 hours.  · For the next week:  · Do not lift more than 5 lbs. for the next week.   Do not operate a lawnmower, motorcycle, chainsaw, or all-terrain vehicle    Avoid excessive (extension/flexion) wrist movement (i.e. supporting weight when rising from a chair or bed, push-ups, lifting garage doors, etc.)   Do not engage in vigorous exercise (i.e. Tennis, Golf, Bowling) using the affected arm    Medications:  · Take all other medicines as directed by your doctor. Do not take any extra aspirin or anti-inflammatory medicines such as Motrin, Aleve or others. They can increase your risk of bleeding. Many over-the-counter drugs contain aspirin. If you are unsure about what the drug contains, check with your pharmacist or doctor before taking it. Tylenol may be used for minor pain or fever. Do not exceed 4,000 mg of acetaminophen (Tylenol) in 24 hours.     Go to the nearest Emergency Room if you have:  · Chest discomfort or pain  · Excessive bruising, blood in urine/stool or black tarry stools.   · Shortness of breath not relieved with rescue inhalers        Cj is aware you are being discharged from the hospital today. They will call you directly to schedule an in-office visit. Please be sure to answer all calls, even if caller ID shows a number that is unfamiliar to you or unknown. Many of their staff are working remotely and have numbers blocked for privacy. If they have not called you by Monday, April 27 please call the office. They are aware you need cardiology and pulmonary follow up and will refer you to clinic.

## 2020-04-23 NOTE — ASSESSMENT & PLAN NOTE
- Baseline Cr 0.8-0.9.   - Cr improved to 1.3. LACHELLE possibly due to over-diuresis.   - UA showed occult blood. No other abnormalities.   - Cont to hold nephrotoxic medications  - Received IV hydration post cath to reduce risk of NITIN  - Will recheck BMP prior to discharge.

## 2020-04-23 NOTE — BRIEF OP NOTE
Brief Operative Note:    : Nic Pollack MD     Referring Physician: Self,Aaareferral     All Operators: Surgeon(s):  MD Jeff Markham MD Vinod A. Chainani, MD     Preoperative Diagnosis: NSTEMI (non-ST elevated myocardial infarction) [I21.4]     Postop Diagnosis: NSTEMI (non-ST elevated myocardial infarction) [I21.4]    Treatments/Procedures: Procedure(s):  Left heart cath    Findings: no coronary disease is present. See catheterization report for full details.  EDP 7.    Estimated Blood loss: 5 cc    Specimens removed: No    Would recommend starting DHP CCB as ef is preserved and patient has severe COPD with O2 dependence.     Shmuel Wilks

## 2020-04-23 NOTE — ASSESSMENT & PLAN NOTE
- Fluctuates. Elevated this AM but patient also agitated and threatening to leave AMA is he doesn't have a cath this AM  - Continue Metoprolol 25 mg bid   - Continue Bidil tid

## 2020-04-23 NOTE — NURSING TRANSFER
Nursing Transfer Note      4/23/2020     Transfer TO Cath Lab /FROM: [facility]  Transfer via Stretcher    Transfer with (no equipment needed)  Transported by transport team  Medicines sent: none  Chart send with patient: YES     Notified:   Patient assessed at 0804      Vitals WNL, no c/o pain, no s/s of distress at the time of transport    Patient was NPO except for meds, HEP drip disconnected per Cath Lab

## 2020-04-23 NOTE — NURSING
Patient is ready for discharge. Patient stable alert and oriented. IVs and Telemetry removed. No complaints of pain. Discussed discharge plan. Reviewed all discharge medications and side effects, appointments, and answered questions with patient and family. Portable oxygen tank and rolling carrier delivered and discharged with patient. Vitals taken and stable prior to discharge.  Transport arrived and belongings taken with patient. __ RX given to patient and patient instructed to go to pharmacy prior to leaving the hospital for noted quantity changes.

## 2020-04-23 NOTE — NURSING
Patient returned from cath lab with right radial pressure device, clean , dry and intact, no bleeding noted.  Vitals taken and within normal limits, fluids perfusing as ordered.

## 2020-04-23 NOTE — PROGRESS NOTES
[facility]  Cardiology  Progress Note    Patient Name: Baltazar Mccray  MRN: [id number]  Admission Date: 4/19/2020  Hospital Length of Stay: 4 days  Code Status: Full Code   Attending Physician: Maximiliano Acevedo MD   Primary Care Physician: Rhonda Of Katia  Expected Discharge Date:   Principal Problem:NSTEMI (non-ST elevated myocardial infarction)    Subjective:     Hospital Course:   Patient admitted for NSTEMI and COPD exacerbation. EKG showed deep TWI in V1-V4 consistent with Wellen's pattern. Troponin mildly elevated and flat 0.275 > 0.289 and BNP 1,132. Chest x-ray showed findings consistent with COPD. He was started on ACS protocol. He was loaded with ASA and Brilinta and started on Hep gtt, Lipitor, and Metoprolol. TTE showed LVEF 50%, normal diastolic function, normal RV systolic function, abnormal septal wall motion, atrial septal aneurysm, and CVP 3 mmHg. Pt received Furosemide 80 mg IV x2  due to shortness of breath. Also started on steroids and duonebs for COPD exacerbation with significant improvement in symptoms. He was scheduled to undergo LHC on 4/21/20, however had LACHELLE on AM labs showing Cr up to 1.6. Diuretics were held with plan to postpone LHC until 4/22/20, however renal function improved minimally overnight and cath postponed again.      Interval History: NAEO. No complaints at present. Lying flat and breathing comfortably. Renal function improved this AM. Patient very eager to have cath done.     Review of Systems   Constitution: Negative for decreased appetite, diaphoresis and malaise/fatigue.   HENT: Negative for nosebleeds.    Eyes: Negative.    Cardiovascular: Negative for chest pain, claudication, dyspnea on exertion, irregular heartbeat, leg swelling, near-syncope, orthopnea, palpitations, paroxysmal nocturnal dyspnea and syncope.   Respiratory: Negative for cough, shortness of breath, snoring, sputum production and wheezing.    Hematologic/Lymphatic:  Negative for bleeding problem. Does not bruise/bleed easily.   Skin: Negative.    Musculoskeletal: Negative for falls, muscle cramps and myalgias.   Gastrointestinal: Negative for bloating, abdominal pain, nausea and vomiting.   Neurological: Negative for dizziness, headaches and light-headedness.     Objective:     Vital Signs (Most Recent):  Temp: 98.4 °F (36.9 °C) (04/23/20 0414)  Pulse: 72 (04/23/20 0742)  Resp: 15 (04/23/20 0742)  BP: (!) 162/81 (04/23/20 0414)  SpO2: 98 % (04/23/20 0742) Vital Signs (24h Range):  Temp:  [97.7 °F (36.5 °C)-98.4 °F (36.9 °C)] 98.4 °F (36.9 °C)  Pulse:  [70-94] 72  Resp:  [15-19] 15  SpO2:  [94 %-98 %] 98 %  BP: (100-162)/(57-85) 162/81     Weight: 55.3 kg (121 lb 14.6 oz)  Body mass index is 18.54 kg/m².     SpO2: 98 %  O2 Device (Oxygen Therapy): nasal cannula      Intake/Output Summary (Last 24 hours) at 4/23/2020 0757  Last data filed at 4/23/2020 0500  Gross per 24 hour   Intake 600 ml   Output 825 ml   Net -225 ml       Lines/Drains/Airways     Peripheral Intravenous Line                 Peripheral IV - Single Lumen 04/19/20 1104 18 G Right Forearm 3 days         Peripheral IV - Single Lumen 04/19/20 1645 20 G Left Forearm 3 days                Physical Exam   Constitutional: He is oriented to person, place, and time. He appears well-developed and well-nourished. No distress.   HENT:   Head: Normocephalic and atraumatic.   Eyes: Conjunctivae and EOM are normal.   Neck: Neck supple. No JVD present. Carotid bruit is not present.   Cardiovascular: Normal rate, regular rhythm, normal heart sounds, intact distal pulses and normal pulses. Exam reveals no gallop and no friction rub.   No murmur heard.  Pulmonary/Chest: Effort normal and breath sounds normal. No respiratory distress. He has no wheezes. He has no rales. He exhibits no tenderness.   Abdominal: Soft. He exhibits no distension and no mass. There is no tenderness.   Musculoskeletal: He exhibits no edema.   Neurological:  He is alert and oriented to person, place, and time.   Skin: Skin is warm and dry. He is not diaphoretic. No pallor.   Psychiatric: He has a normal mood and affect.   Nursing note and vitals reviewed.       Significant Labs:   BMP:   Recent Labs   Lab 20  0354 20  0453   GLU 98 84   * 138   K 4.2 4.3   CL 96 100   CO2 27 28   BUN 23* 23*   CREATININE 1.5* 1.3   CALCIUM 9.5 8.7     CBC   Recent Labs   Lab 20  0354 20  0453   WBC 7.74 7.82   HGB 12.1* 12.0*   HCT 37.6* 37.9*    313       Significant Imagin. TTE (20)  · Low normal left ventricular systolic function. The estimated ejection fraction is 50%.  · Segmental wall motion abnormalities.  · Septal wall has abnormal motion.  · Normal LV diastolic function.  · Normal right ventricular systolic function.  · Mild mitral regurgitation.  · Normal central venous pressure (3 mmHg).  · An atrial septal aneurysm is present.    2. Chest x-ray (20)  Mediastinal structures are midline. Hilar contours are unremarkable. Cardiac silhouette is normal in size. Lungs are over expanded but symmetric. Subsegmental band like opacity projects over the right middle lung zone, likely atelectasis or scarring. No consolidation. No pneumothorax or pleural effusion. No free air beneath the diaphragm. No acute osseous abnormalities.    Assessment and Plan:     Brief HPI: 58 y/o AAM with PMH of COPD and HFrEF (EF since improved) presented with acute onset SOB and was hypoxic upon arrival. EKG showed worsening TWI in V1-V4 consistent with Wellen's pattern and troponin was elevated at 0.2. He is admitted for NSTEMI and acute COPD exacerbation.      * NSTEMI (non-ST elevated myocardial infarction)  - No prior history of C. CTA during admission on 3/2020 showed dense multi-vessel coronary atherosclerosis.  - Elevated Troponin on presentation. EKG with deep TWI in V1-V4 and developed new TWIs in inferior leads (now resolved)  - Cont daily  EKGs. This AM shows improvement in anterior TWIs and resolution of inferior changes.   - ACS  Protocol. Loaded with aspirin and ticagrelor. Heparin bolus and gtt. On lipitor 40 mg daily and Metoprolol.   - LACHELLE improved. Cath this AM.   - Has had two negative COVID tests. Needs negative COVID test within 48 hours of LHC. Will order another one.          LACHELLE (acute kidney injury)  - Baseline Cr 0.8-0.9.   - Cr improved to 1.3. LACHELLE possibly due to over-diuresis.   - UA showed occult blood. No other abnormalities.   - Cont to hold nephrotoxic medications  - Will need IV hydration post cath to reduce risk of NITIN    Chronic combined systolic and diastolic congestive heart failure  - History of HFrEF EF 10% on 3/17/20. Follow up TTE on 4/4/20 showed improvement to 58%. Repeat TTE this admission showed LVEF low-normal at 50%.   - Euvolemic on exam   - Continue Metoprolol 25 mg bid  - Hold home Entresto. Never picked it up from the pharmacy as outpatient and patient does not know the names of the meds he was taking at home.       Chronic obstructive pulmonary disease with acute exacerbation  - On 2L of O2 at home. On arrival, tachypnic and satting 79% on room air  - Exacerbation resolved. Maintaining oxygen sats >90% on 1L of O2 via NC.   - ABG showed respiratory acidosis. On daily Spiriva and Duo nebs as needed.  - Continue Prednisone 40 mg qd x 5 doses.   - Continue Doxy 100 mg bid.       Essential hypertension  - Fluctuates. Elevated this AM but patient also agitated and threatening to leave AMA is he doesn't have a cath this AM  - Continue Metoprolol 25 mg bid   - Continue Bidil tid         VTE Risk Mitigation (From admission, onward)         Ordered     heparin 25,000 units in dextrose 5% (100 units/ml) IV bolus from bag - ADDITIONAL PRN BOLUS - 60 units/kg (max bolus 4000 units)  As needed (PRN)     Question:  Heparin Infusion Adjustment (DO NOT MODIFY ANSWER)  Answer:   [email or web address]\epic\Images\Pharmacy\HeparinInfusions\heparin LOW INTENSITY nomogram for [facility] [id number]    04/19/20 1506     heparin 25,000 units in dextrose 5% (100 units/ml) IV bolus from bag - ADDITIONAL PRN BOLUS - 30 units/kg (max bolus 4000 units)  As needed (PRN)     Question:  Heparin Infusion Adjustment (DO NOT MODIFY ANSWER)  Answer:  \\[email or web address]\epic\Images\Pharmacy\HeparinInfusions\heparin LOW INTENSITY nomogram for [facility] [id number]    04/19/20 1506     heparin 25,000 units in dextrose 5% 250 mL (100 units/mL) infusion LOW INTENSITY nomogram - [facility]  Continuous     Question:  Heparin Infusion Adjustment (DO NOT MODIFY ANSWER)  Answer:  \[email or web address]\Images\Pharmacy\HeparinInfusions\heparin LOW INTENSITY nomogram for [facility] [id number]    04/19/20 1506     Reason for no Mechanical VTE Prophylaxis  Once     Question:  Reasons:  Answer:  IV Heparin within 24 hrs. Pre or Post-Op    04/19/20 1503     IP VTE HIGH RISK PATIENT  Once      04/19/20 1503                Debra Kamara PA-C  Cardiology  [facility]

## 2020-04-23 NOTE — ASSESSMENT & PLAN NOTE
- No prior history of LHC. CTA during admission on 3/2020 showed dense multi-vessel coronary atherosclerosis.  - Elevated Troponin on presentation. EKG with deep TWI in V1-V4 and developed new TWIs in inferior leads (now resolved)  - Cont daily EKGs. This AM shows improvement in anterior TWIs and resolution of inferior changes.   - ACS  Protocol. Loaded with aspirin and ticagrelor. Heparin bolus and gtt. On lipitor 40 mg daily and Metoprolol.   - LACHELLE improved. Cath this AM.   - Has had two negative COVID tests. Needs negative COVID test within 48 hours of C. Will order another one.

## 2020-04-23 NOTE — ASSESSMENT & PLAN NOTE
- History of HFrEF EF 10% on 3/17/20. Follow up TTE on 4/4/20 showed improvement to 58%. Repeat TTE this admission showed LVEF low-normal at 50%.   - Euvolemic on exam   - Continue Metoprolol 25 mg bid  - Continue Bidil  - Discontinue Entresto. Patient was not taking it as outpatient anyway, and given his recovered EF and recent LACHELLE, he is unlikely to benefit significantly from it.   - Recommend establishing care with outpatient cardiologist.

## 2020-04-23 NOTE — SUBJECTIVE & OBJECTIVE
Continue Azithromycin/ceftriaxone.  DC Flagyl (doubt aspiration).  Cleared by SLP for regular consistency diet and thin liquids.     Interval History: NAEO. No complaints at present. Lying flat and breathing comfortably. Renal function improved this AM. Patient very eager to have cath done.     Review of Systems   Constitution: Negative for decreased appetite, diaphoresis and malaise/fatigue.   HENT: Negative for nosebleeds.    Eyes: Negative.    Cardiovascular: Negative for chest pain, claudication, dyspnea on exertion, irregular heartbeat, leg swelling, near-syncope, orthopnea, palpitations, paroxysmal nocturnal dyspnea and syncope.   Respiratory: Negative for cough, shortness of breath, snoring, sputum production and wheezing.    Hematologic/Lymphatic: Negative for bleeding problem. Does not bruise/bleed easily.   Skin: Negative.    Musculoskeletal: Negative for falls, muscle cramps and myalgias.   Gastrointestinal: Negative for bloating, abdominal pain, nausea and vomiting.   Neurological: Negative for dizziness, headaches and light-headedness.     Objective:     Vital Signs (Most Recent):  Temp: 98.4 °F (36.9 °C) (04/23/20 0414)  Pulse: 72 (04/23/20 0742)  Resp: 15 (04/23/20 0742)  BP: (!) 162/81 (04/23/20 0414)  SpO2: 98 % (04/23/20 0742) Vital Signs (24h Range):  Temp:  [97.7 °F (36.5 °C)-98.4 °F (36.9 °C)] 98.4 °F (36.9 °C)  Pulse:  [70-94] 72  Resp:  [15-19] 15  SpO2:  [94 %-98 %] 98 %  BP: (100-162)/(57-85) 162/81     Weight: 55.3 kg (121 lb 14.6 oz)  Body mass index is 18.54 kg/m².     SpO2: 98 %  O2 Device (Oxygen Therapy): nasal cannula      Intake/Output Summary (Last 24 hours) at 4/23/2020 0757  Last data filed at 4/23/2020 0500  Gross per 24 hour   Intake 600 ml   Output 825 ml   Net -225 ml       Lines/Drains/Airways     Peripheral Intravenous Line                 Peripheral IV - Single Lumen 04/19/20 1104 18 G Right Forearm 3 days         Peripheral IV - Single Lumen 04/19/20 1645 20 G Left Forearm 3 days                Physical Exam   Constitutional: He is oriented to person, place, and time. He appears well-developed and  well-nourished. No distress.   HENT:   Head: Normocephalic and atraumatic.   Eyes: Conjunctivae and EOM are normal.   Neck: Neck supple. No JVD present. Carotid bruit is not present.   Cardiovascular: Normal rate, regular rhythm, normal heart sounds, intact distal pulses and normal pulses. Exam reveals no gallop and no friction rub.   No murmur heard.  Pulmonary/Chest: Effort normal and breath sounds normal. No respiratory distress. He has no wheezes. He has no rales. He exhibits no tenderness.   Abdominal: Soft. He exhibits no distension and no mass. There is no tenderness.   Musculoskeletal: He exhibits no edema.   Neurological: He is alert and oriented to person, place, and time.   Skin: Skin is warm and dry. He is not diaphoretic. No pallor.   Psychiatric: He has a normal mood and affect.   Nursing note and vitals reviewed.       Significant Labs:   BMP:   Recent Labs   Lab 20  0354 20  0453   GLU 98 84   * 138   K 4.2 4.3   CL 96 100   CO2 27 28   BUN 23* 23*   CREATININE 1.5* 1.3   CALCIUM 9.5 8.7     CBC   Recent Labs   Lab 20  0354 20  0453   WBC 7.74 7.82   HGB 12.1* 12.0*   HCT 37.6* 37.9*    313       Significant Imagin. TTE (20)  · Low normal left ventricular systolic function. The estimated ejection fraction is 50%.  · Segmental wall motion abnormalities.  · Septal wall has abnormal motion.  · Normal LV diastolic function.  · Normal right ventricular systolic function.  · Mild mitral regurgitation.  · Normal central venous pressure (3 mmHg).  · An atrial septal aneurysm is present.    2. Chest x-ray (20)  Mediastinal structures are midline. Hilar contours are unremarkable. Cardiac silhouette is normal in size. Lungs are over expanded but symmetric. Subsegmental band like opacity projects over the right middle lung zone, likely atelectasis or scarring. No consolidation. No pneumothorax or pleural effusion. No free air beneath the diaphragm. No acute  osseous abnormalities.

## 2020-04-23 NOTE — ASSESSMENT & PLAN NOTE
- Baseline Cr 0.8-0.9.   - Cr improved to 1.3. LACHELLE possibly due to over-diuresis.   - UA showed occult blood. No other abnormalities.   - Cont to hold nephrotoxic medications  - Will need IV hydration post cath to reduce risk of NITIN

## 2020-04-23 NOTE — ASSESSMENT & PLAN NOTE
- Fluctuates but controlled on average.  - Continue Metoprolol 25 mg bid   - Continue Bidil tid   - Patient does not need to resume Entresto given his now preserved ejection fraction and recent LACHELLE

## 2020-04-23 NOTE — PLAN OF CARE
Patient remained free of falls, trauma, injury, and skin breakdown.  VSS; no patient complaints at this time.  Fall precautions maintained; education provided. Pt NPO at midnight for anticipated heart cath.  Pt prepared for heart cath; 2 working PIVs in place.  New type and screen orders obtained.  Plan of care reviewed; patient verbalized understanding.  All questions and concerns addressed; will continue to monitor.

## 2020-04-23 NOTE — ASSESSMENT & PLAN NOTE
- Continue Aspirin 81 mg daily   - Continue Lipitor 40 mg qhs. He will need LDL rechecked as outpatient.   - Continue beta blocker  - Lifestyle modification for risk factor reduction recommended, including smoking cessation, heart healthy diet, stress reduction, and increase in aerobic exercise.

## 2020-04-23 NOTE — DISCHARGE SUMMARY
[facility]  Cardiology  Discharge Summary      Patient Name: Baltazar Mccray  MRN: [id number]  Admission Date: 4/19/2020  Hospital Length of Stay: 4 days  Discharge Date and Time:  04/23/2020 2:11 PM  Attending Physician: Maximiliano Acevedo MD    Discharging Provider: Debra Kamara PA-C  [facility]    HPI:   59 years old  male patient with past medical history of COPD on home oxygen 2 L nightly, asthma, seizure disorder (last seizure 4 years), recently diagnosed HFrEF EF 10% on 3/17/20, treated with GDMT and improved to 58%, no ischemic eval presenting today with sudden onset SOB at rest. He was waiting for the bus and felt acutely sob for which he presented to ER. Denies any chest pain, nausea,vomiting, Palpitations, fevers. Previous EKG showed TWI in V1,and V2 waves in lateral leads. He never had stress test and cardiac cath in the past     Procedure(s):  Left heart cath     Hospital Course:  Patient admitted for NSTEMI and COPD exacerbation. EKG showed deep TWI in V1-V4 consistent with Wellen's pattern. Troponin mildly elevated and flat 0.275 > 0.289 and BNP 1,132. Chest x-ray showed findings consistent with COPD. He was started on ACS protocol. He was loaded with ASA and Brilinta and started on Hep gtt, Lipitor, and Metoprolol. TTE showed LVEF 50%, normal diastolic function, normal RV systolic function, abnormal septal wall motion, atrial septal aneurysm, and CVP 3 mmHg. Pt received Furosemide 80 mg IV x2  due to shortness of breath. Also started on steroids and duonebs for COPD exacerbation with significant improvement in symptoms.     He was scheduled to undergo LHC on 4/21/20, however had LACHELLE on AM labs showing Cr up to 1.6. Diuretics were held with plan to postpone LHC until 4/22/20, however renal function improved minimally overnight and cath postponed again.  Today, patient's renal function improved, although still not  quite back to baseline. He underwent a left cardiac catheterization via R radial artery which revealed nonobstructive coronary artery disease. He was taken off Brilinta, and continued on low dose Aspirin, Metoprolol, and a high intensity statin. He was continued on Bidil for blood pressure control. The Entresto was not resumed, as his ejection fraction has recovered. His COPD exacerbation resolved, and he was continued on his home COPD medications and continuous home oxygen.        Review of Systems   Constitution: Negative for decreased appetite, diaphoresis and malaise/fatigue.   HENT: Negative for nosebleeds.    Eyes: Negative.    Cardiovascular: Negative for chest pain, claudication, dyspnea on exertion, irregular heartbeat, leg swelling, near-syncope, orthopnea, palpitations, paroxysmal nocturnal dyspnea and syncope.   Respiratory: Negative for cough, shortness of breath, snoring, sputum production and wheezing.    Hematologic/Lymphatic: Negative for bleeding problem. Does not bruise/bleed easily.   Skin: Negative.    Musculoskeletal: Negative for falls, muscle cramps and myalgias.   Gastrointestinal: Negative for bloating, abdominal pain, nausea and vomiting.   Neurological: Negative for dizziness, headaches and light-headedness.      Objective:      Vitals:    04/23/20 1130 04/23/20 1200 04/23/20 1231 04/23/20 1329   BP: (!) 148/88 136/87 134/78 135/80   BP Location: Left arm Left arm Left arm Left arm   Patient Position: Sitting Sitting Sitting Sitting   Pulse:  82  84   Resp:    18   Temp:    98.3 °F (36.8 °C)   TempSrc:       SpO2:    98%   Weight:       Height:            SpO2: 98 %  O2 Device (Oxygen Therapy): nasal cannula     Physical Exam   Constitutional: He is oriented to person, place, and time. He appears well-developed and well-nourished. No distress.   HENT:   Head: Normocephalic and atraumatic.   Eyes: Conjunctivae and EOM are normal.   Neck: Neck supple. No JVD present. Carotid bruit is not  present.   Cardiovascular: Normal rate, regular rhythm, normal heart sounds, intact distal pulses and normal pulses. Exam reveals no gallop and no friction rub.   No murmur heard.  Pulmonary/Chest: Effort normal and breath sounds normal. No respiratory distress. He has no wheezes. He has no rales. He exhibits no tenderness.   Abdominal: Soft. He exhibits no distension and no mass. There is no tenderness.   Musculoskeletal: He exhibits no edema. Vascband in place. Hand pink and warm. No bleeding or swelling noted.   Neurological: He is alert and oriented to person, place, and time.   Skin: Skin is warm and dry. He is not diaphoretic. No pallor.   Psychiatric: He has a normal mood and affect.   Nursing note and vitals reviewed.    Consults:   Consults (From admission, onward)        Status Ordering Provider     Inpatient consult to Cardiology  Once     Provider:  (Not yet assigned)    Completed THAO STERN     Inpatient consult to Interventional Cardiology  Once     Provider:  (Not yet assigned)    Completed SHUBHAM MEJIA          Significant Diagnostic Studies:   CMP   Recent Labs   Lab 04/22/20  0354 04/23/20  0453   * 138   K 4.2 4.3   CL 96 100   CO2 27 28   GLU 98 84   BUN 23* 23*   CREATININE 1.5* 1.3   CALCIUM 9.5 8.7   PROT 7.2 7.0   ALBUMIN 3.3* 3.2*   BILITOT 0.6 0.4   ALKPHOS 99 89   AST 41* 46*   ALT 35 39   ANIONGAP 11 10   ESTGFRAFRICA 58.9* >60.0   EGFRNONAA 50.9* >60.0   CBC   Recent Labs   Lab 04/22/20  0354 04/23/20  0453   WBC 7.74 7.82   HGB 12.1* 12.0*   HCT 37.6* 37.9*    313   Lipid Panel   Lab Results   Component Value Date    CHOL 260 (H) 04/19/2020    HDL 91 (H) 04/19/2020    LDLCALC 140.0 04/19/2020    TRIG 145 04/19/2020    CHOLHDL 35.0 04/19/2020     Recent Labs   Lab 04/19/20  1444   TROPONINI 0.275*     All labs within the past 24 hours have been reviewed    Radiology:     Results for orders placed or performed during the hospital encounter of 04/19/20   X-ray  Chest PA And Lateral    Narrative    EXAMINATION:  XR CHEST PA AND LATERAL    CLINICAL HISTORY:  No clinical indication provided.    TECHNIQUE:  PA and lateral views of the chest were performed.    COMPARISON:  Radiograph 04/19/2020.    FINDINGS:  Mediastinal structures are midline.  Hilar contours are unremarkable.  Cardiac silhouette is normal in size.  Lungs are over expanded but symmetric.  Subsegmental band like opacity projects over the right middle lung zone, likely atelectasis or scarring.  No consolidation.  No pneumothorax or pleural effusion.  No free air beneath the diaphragm.  No acute osseous abnormalities.      Impression    Radiographic findings suggestive of COPD.      Electronically signed by: David Jordan MD  Date:    04/19/2020  Time:    15:35     Cardiac Graphics:   Echocardiogram:   2D echo with color flow doppler:   Results for orders placed or performed during the hospital encounter of 08/12/18   2D echo with color flow doppler   Result Value Ref Range    QEF 60 55 - 65    Mitral Valve Regurgitation TRIVIAL     Est. PA Systolic Pressure 38.28     Mitral Valve Mobility NORMAL     Narrative    Date of Procedure: 08/13/2018        TEST DESCRIPTION   Technical Quality: This is a technically adequate study.     Aorta: The aortic root is normal in size, measuring 2.2 cm at sinotubular junction and 3.0 cm at Sinuses of Valsalva. The proximal ascending aorta is normal in size, measuring 2.8 cm across.     Left Atrium: The left atrial volume index is moderately enlarged, measuring 41.93 cc/m2.     Left Ventricle: The left ventricle is normal in size, with an end-diastolic diameter of 4.9 cm, and an end-systolic diameter of 3.2 cm. LV wall thickness is normal, with the septum measuring 0.6 cm and the posterior wall measuring 0.7 cm across. Relative   wall thickness was normal at 0.29, and the LV mass index was 65.8 g/m2 consistent with normal left ventricular mass. There are no regional wall motion  abnormalities. Left ventricular systolic function appears normal. Visually estimated ejection fraction   is 60-65%. The LV Doppler derived stroke volume equals 75.0 ccs.     Diastolic indices: E wave velocity 0.9 m/s, E/A ratio 1.3,  msec., E/e' ratio(avg) 8. Diastolic function is indeterminate.     Right Atrium: The right atrium is normal in size, measuring 4.6 cm in length and 4.1 cm in width in the apical view.     Right Ventricle: The right ventricle is normal in size measuring 3.7 cm at the base in the apical right ventricle-focused view. Global right ventricular systolic function appears normal. Tricuspid annular plane systolic excursion (TAPSE) is 2.3 cm.   Tissue Doppler-derived tricuspid annular peak systolic velocity (S prime) is 12.7 cm/s. The estimated PA systolic pressure is 38 mmHg.     Aortic Valve:  The aortic valve is normal in structure with normal leaflet mobility.     Mitral Valve:  The mitral valve is normal in structure with normal leaflet mobility. There is trivial mitral regurgitation.     Tricuspid Valve:  The tricuspid valve is normal in structure with normal leaflet mobility.     IVC: IVC is normal in size and collapses > 50% with a sniff, suggesting normal right atrial pressure of 3 mmHg.     Atrial Septum: The atrial septum is aneurysmal and bulging to the right.     Intracavitary: There is no evidence of pericardial effusion, intracavity mass, thrombi, or vegetation.         CONCLUSIONS     1 - Moderate left atrial enlargement.     2 - Normal left ventricular systolic function (EF 60-65%).     3 - No wall motion abnormalities.     4 - Indeterminate LV diastolic function.     5 - Normal right ventricular systolic function .     6 - The estimated PA systolic pressure is 38 mmHg.     7 - Atrial septal aneurysm .             This document has been electronically    SIGNED BY: Jaqueline Wadsworth MD On: 08/13/2018 14:34     Angiography:  Left heart catheterization 4/23/20    · LV end  diastolic pressure is normal.  · LVEDP (Pre): 10  · Estimated blood loss: <50 mL  · Normal coronary arteries.       Pending Diagnostic Studies:     Procedure Component Value Units Date/Time    Basic metabolic panel [[id number]] Collected:  04/23/20 1354    Order Status:  Sent Lab Status:  In process Updated:  04/23/20 1354    Specimen:  Blood           Final Active Diagnoses:    Diagnosis Date Noted POA    PRINCIPAL PROBLEM:  NSTEMI (non-ST elevated myocardial infarction) [I21.4] 04/19/2020 Yes    Nonobstructive atherosclerosis of coronary artery [I25.10] 04/23/2020 Unknown    LACHELLE (acute kidney injury) [N17.9] 04/22/2020 No    Chronic combined systolic and diastolic congestive heart failure [I50.42] 04/19/2020 Yes    Chronic respiratory failure with hypoxia [J96.11] 08/12/2018 Yes    Tobacco abuse [Z72.0] 08/12/2018 Yes    Essential hypertension [I10] 05/19/2017 Yes      Problems Resolved During this Admission:    Diagnosis Date Noted Date Resolved POA    Shortness of breath [R06.02] 04/19/2020 04/22/2020 Yes    Chronic obstructive pulmonary disease with acute exacerbation [J44.1] 04/19/2020 04/23/2020 Yes     * NSTEMI (non-ST elevated myocardial infarction)  - Underwent LHC this AM via R Radial artery without complications. Angiogram revealed nonobstructive disease.   - Elevated Troponin and EKG changes on presentation likely 2/2 to demand-ischemia in setting of acute on chronic respiratory failure with hypoxia  - Follow up EKGs show improvement in anterior TWIs and resolution of inferior changes.   - Stop Heparin and Brilinta. Continue ASA 81 mg daily for life   - Receiving IV hydration post cath per poseidon protocol to reduce risk of NITIN         Nonobstructive atherosclerosis of coronary artery  - Continue Aspirin 81 mg daily   - Continue Lipitor 40 mg qhs. He will need LDL rechecked as outpatient.   - Continue beta blocker  - Lifestyle modification for risk factor reduction recommended, including  smoking cessation, heart healthy diet, stress reduction, and increase in aerobic exercise.     LACHELLE (acute kidney injury)  - Baseline Cr 0.8-0.9.   - Cr improved to 1.3. LACHELLE possibly due to over-diuresis.   - UA showed occult blood. No other abnormalities.   - Cont to hold nephrotoxic medications  - Received IV hydration post cath to reduce risk of NITIN  - Will recheck BMP prior to discharge.     Chronic combined systolic and diastolic congestive heart failure  - History of HFrEF EF 10% on 3/17/20. Follow up TTE on 4/4/20 showed improvement to 58%. Repeat TTE this admission showed LVEF low-normal at 50%.   - Euvolemic on exam   - Continue Metoprolol 25 mg bid  - Continue Bidil  - Discontinue Entresto. Patient was not taking it as outpatient anyway, and given his recovered EF and recent LACHELLE, he is unlikely to benefit significantly from it.   - Recommend establishing care with outpatient cardiologist.     Tobacco abuse  Smoking Cessation:  -Pt currently smoking  2 cigarettes every other day   -After counseling, pt interested in quitting today  -Referred to tobacco cessation counseling today  -Consider Wellbutrin 50mg and Nicotine patch will defer this to PCP        Chronic respiratory failure with hypoxia  - Maintaining oxygen saturations > 90% on 1-2L of oxygen via nasal canula. The patient is supposed to be on oxygen 24/7 at home, but has only been using it at night. Discussed proper instructions with patient.    - Continue home COPD medications: Spiriva, Combivent, Advair, Atrovent  - Finish five day course of Doxycycline and Prednisone for COPD exacerbation  - Refer to Pulmonology and Cardiology as outpatient  - Follow up with PCP in one week    Essential hypertension  - Fluctuates but controlled on average.  - Continue Metoprolol 25 mg bid   - Continue Bidil tid   - Patient does not need to resume Entresto given his now preserved ejection fraction and recent LACHELLE        Discharged Condition: good    Disposition:  "Home or Self Care    Follow Up:  Follow-up Information     [facility] - Cardiology In 1 week.    Specialty:  Cardiology  Contact information:  [address]  [facility] [address] [phone]  Additional information:  3rd floor           [facility] - Pulmonary Services In 1 week.    Specialty:  Pulmonology  Contact information:  [address]  [facility] [address] [phone]  Additional information:  9th Floor           [facility] In 1 week.    Contact information:  [address]  [facility] [id number] [phone]                 Patient Instructions:      OXYGEN FOR HOME USE     Order Specific Question Answer Comments   Liter Flow 2    Duration Continuous    Qualifying SpO2: 79    Testing done at: Rest    Route nasal cannula    Portable mode: continuous    Device home concentrator with portable unit    Length of need (in months): 99 mos    Patient condition with qualifying saturation COPD    Height: 5' 8" (1.727 m)    Weight: 55.3 kg (121 lb 14.6 oz)    Does patient have medical equipment at home? oxygen    Alternative treatment measures have been tried or considered and deemed clinically ineffective. Yes    Vendor: [facility]    Expected Date of Delivery: 4/23/2020      Ambulatory referral/consult to Cardiology   Standing Status: Future   Referral Priority: Routine Referral Type: Consultation   Referral Reason: Specialty Services Required   Requested Specialty: Cardiology   Number of Visits Requested: 1     Medications:  Reconciled Home Medications:      Medication List      START taking these medications    aspirin 81 MG EC tablet  Commonly known as:  ECOTRIN  Take 1 tablet (81 mg total) by mouth once daily.  Start taking on:  April 24, 2020     atorvastatin 40 MG tablet  Commonly known as:  LIPITOR  Take 1 tablet (40 mg total) by mouth once daily.  Start taking on:  April 24, 2020     doxycycline 100 MG tablet  Commonly known as:  VIBRA-TABS  Take " 1 tablet (100 mg total) by mouth every 12 (twelve) hours.     metoprolol tartrate 25 MG tablet  Commonly known as:  LOPRESSOR  Take 1 tablet (25 mg total) by mouth 2 (two) times daily.     predniSONE 20 MG tablet  Commonly known as:  DELTASONE  Take 2 tablets (40 mg total) by mouth once daily.  Start taking on:  April 24, 2020        CONTINUE taking these medications    BiDiL 20-37.5 mg Tab  Generic drug:  isosorbide-hydrALAZINE 20-37.5 mg  Take 1 tablet by mouth 2 (two) times daily.     fluticasone propion-salmeterol 45-21 mcg/dose 45-21 mcg/actuation Hfaa inhaler  Commonly known as:  ADVAIR HFA  Inhale 2 puffs into the lungs every 12 (twelve) hours. Controller     ipratropium-albuteroL  mcg/actuation inhaler  Commonly known as:  COMBIVENT  Inhale 1 puff into the lungs every 6 (six) hours as needed for Wheezing. Rescue     omeprazole 20 MG capsule  Commonly known as:  PRILOSEC  Take 1 capsule (20 mg total) by mouth once daily.     PROAIR HFA 90 mcg/actuation inhaler  Generic drug:  albuterol  Inhale 2 puffs into the lungs every 6 (six) hours as needed for Wheezing. Rescue     senna 8.6 mg tablet  Commonly known as:  SENOKOT  Take 1 tablet by mouth daily as needed for Constipation.     SPIRIVA WITH HANDIHALER 18 mcg inhalation capsule  Generic drug:  tiotropium  Inhale 1 capsule (18 mcg total) into the lungs once daily. Controller        STOP taking these medications    ENTRESTO 49-51 mg per tablet  Generic drug:  sacubitriL-valsartan            Time spent on the discharge of patient: 45 minutes    Debra Kamara PA-C  Cardiology  [facility]

## 2020-04-23 NOTE — ASSESSMENT & PLAN NOTE
- Underwent LHC this AM via R Radial artery without complications. Angiogram revealed nonobstructive disease.   - Elevated Troponin and EKG changes on presentation likely 2/2 to demand-ischemia in setting of acute on chronic respiratory failure with hypoxia  - Follow up EKGs show improvement in anterior TWIs and resolution of inferior changes.   - Stop Heparin and Brilinta. Continue ASA 81 mg daily for life   - Receiving IV hydration post cath per poseidon protocol to reduce risk of NITIN

## 2020-04-23 NOTE — ASSESSMENT & PLAN NOTE
- History of HFrEF EF 10% on 3/17/20. Follow up TTE on 4/4/20 showed improvement to 58%. Repeat TTE this admission showed LVEF low-normal at 50%.   - Euvolemic on exam   - Continue Metoprolol 25 mg bid  - Hold home Entresto. Never picked it up from the pharmacy as outpatient and patient does not know the names of the meds he was taking at home.

## 2020-04-24 ENCOUNTER — PATIENT OUTREACH (OUTPATIENT)
Dept: ADMINISTRATIVE | Facility: CLINIC | Age: 60
End: 2020-04-24

## 2020-04-24 NOTE — PATIENT INSTRUCTIONS
Discharge Instructions for Heart Attack  You have had a heart attack (acute myocardial infarction). A heart attack occurs when a vessel that sends blood to your heart suddenly becomes blocked. This causes your heart not to work as well as it should. Follow these guidelines for home care and lifestyle changes.  Home care  · Take your medicines exactly as directed. Dont skip doses. Talk with your healthcare provider if your medicines aren't working for you. Together you can come up with another treatment plan.  · Remember that recovery after a heart attack takes time. Plan to rest for at least 4 to 8 weeks while you recover. Then return to normal activity when your doctor says its OK.  · Ask your doctor about joining a heart rehabilitation program. This can help strengthen your heart and lungs and give you more energy and confidence.  · Tell your doctor if you are feeling depressed. Feelings of sadness are common after a heart attack. But it is important to speak to someone or seek counseling if you are feeling overwhelmed by these feelings.  · Call 911 right away if you have chest pain or pain that goes to your shoulder, neck, or back. Don't drive yourself to the hospital.  · Ask your family members to learn CPR. This is an important skill that can save lives when it's needed.  · Learn to take your own blood pressure and pulse. Keep a record of your results. Ask your doctor when you should seek emergency medical attention. He or she will tell you which blood pressure reading is dangerous.  Lifestyle changes  Your heart attack might have been caused by cardiovascular disease. Your healthcare provider will work with you to make changes to your lifestyle. This will help the heart disease from getting worse. These changes will most likely be a combination of diet and exercise.  Diet  Your healthcare provider will tell you what changes you need to make to your diet. You may need to see a registered dietitian for help  with these diet changes. These changes may include:  · Cutting back on how much fat and cholesterol you eat  · Cutting back on how much salt (sodium) you eat, especially if you have high blood pressure  · Eating more fresh vegetables and fruits  · Eating lean proteins such as fish, poultry, beans, and peas, and eating less red meat and processed meats  · Using low-fat dairy products  · Using vegetable and nut oils in limited amounts  · Limiting how many sweets and processed foods such as chips, cookies, and baked goods you eat  · Limiting how often you eat out. And when you do eat out, making better food choices.  · Not eating fried or greasy foods, or foods high in saturated fat  Exercise  Your healthcare provider may tell you to get more exercise if you haven't been physically active. Depending on your case, your provider may recommend that you get moderate to vigorous physical activity for at least 40 minutes each day, and for at least 3 to 4 days each week. A few examples of moderate to vigorous activity include:  · Walking at a brisk pace, about 3 to 4 miles per hour  · Jogging or running  · Swimming or water aerobics  · Hiking  · Dancing  · Martial arts  · Tennis  · Riding a bicycle or stationary bike  Other changes  Your healthcare provider may also recommend that you:  · Lose weight. If you are overweight or obese, your provider will work with you to lose extra pounds. Making diet changes and getting more exercise can help. A good goal is to lose your 10% of your body weight in one year.  · Stop smoking. Sign up for a stop-smoking program to make it more likely for you to quit for good. You can join a stop-smoking support group. Or ask your doctor about nicotine replacement products.  · Learn to manage stress. Stress management techniques to help you deal with stress in your home and work life. This will help you feel better emotionally and ease the strain on your heart.  Follow-up  Make a follow-up  appointment as directed by our staff.     When to seek medical advice  Call 911 right away if you have:  · Chest pain that goes to your neck, jaw, back, or shoulder  · Shortness of breath  Otherwise, call your doctor immediately if you have:  · Lightheadedness, dizziness, or fainting  · Feeling of irregular heartbeat or fast pulse.   Date Last Reviewed: 10/1/2016  © 7993-8683 [employer]. [address] [place] [address], [place] [address]. All rights reserved. This information is not intended as a substitute for professional medical care. Always follow your healthcare professional's instructions.

## 2020-04-24 NOTE — PLAN OF CARE
Pt d/c home. DOC will schedule with patient directly for follow up appointment at number on file.       04/24/20 1143   Final Note   Assessment Type Final Discharge Note   Anticipated Discharge Disposition Home   Hospital Follow Up  Appt(s) scheduled? Yes   Discharge plans and expectations educations in teach back method with documentation complete? Yes     Julie Haase RN  Case Management [phone]

## 2020-04-24 NOTE — PHYSICIAN QUERY
PT Name: Baltazar Mccray  MR #: [id number]    Physician Query Form - Myocardial Infarct Specificity     CDS/: Cyrus Hernandez Jr, RN               Contact information:[email or web address]   This form is a permanent document in the medical record.     Query Date: April 24, 2020    By submitting this query, we are merely seeking further clarification of documentation.. Please utilize your independent clinical judgment when addressing the question(s) below.    The Medical record contains the following:   Indicators  Supporting Clinical Findings Location in Medical Record   x MI documented NSTEMI (non-ST elevated myocardial infarction)   4/23 Discharge Summary   x EKG/Cardiac Cath Normal coronary arteries.     Underwent LHC this AM via R Radial artery without complications. Angiogram revealed nonobstructive disease    Elevated Troponin and EKG changes on presentation likely 2/2 to demand-ischemia in setting of acute on chronic respiratory failure with hypoxia    Follow up EKGs show improvement in anterior TWIs and resolution of inferior changes.   - Stop Heparin and Brilinta. Continue ASA 81 mg daily for life    4/23 Cath Report    4/23 Discharge Summary        4/23 Discharge Summary          4/23 Discharge Summary     Medications/Treatment     x Other He was waiting for the bus and felt acutely sob for which he presented to ER. Denies any chest pain, nausea,vomiting, Palpitations, fevers   4/19 H&P     Please specify the status of the NSTEMI Diagnosis         [   ]    [X   ]    [   ]    [   ]    [   ] NSTEMI,Present on Admit    NSTEMI due to Demand Ischemia (Type 2),Present on Admit    NSTEMI, Ruled Out Demand Ischemia Only    Other (please specify) __________________________    Clinically undetermined

## 2020-04-24 NOTE — PROGRESS NOTES
C3 nurse attempted to contact patient. No answer. The following message was left for the patient to return the call:  Good morning, I am a nurse calling on behalf of [facility] from the [facility].  This is a Transitional Care Call for Baltazar Mccray. When you have a moment please contact us at [phone] or [phone] Monday through Friday, between the hours of 8 am to 4 pm. We look forward to speaking with you. On behalf of [facility] have a nice day.    The patient does not have a scheduled HOSFU appointment within 7 days post hospital discharge date 4/23. Message sent to Physician staff to assist with HOSFU appointment scheduling.

## 2020-05-06 ENCOUNTER — HOSPITAL ENCOUNTER (EMERGENCY)
Facility: HOSPITAL | Age: 60
Discharge: HOME | End: 2020-05-06
Attending: EMERGENCY MEDICINE
Payer: MEDICAID

## 2020-05-06 VITALS
HEART RATE: 72 BPM | TEMPERATURE: 99 F | RESPIRATION RATE: 20 BRPM | OXYGEN SATURATION: 94 % | SYSTOLIC BLOOD PRESSURE: 170 MMHG | DIASTOLIC BLOOD PRESSURE: 100 MMHG

## 2020-05-06 DIAGNOSIS — Z20.822 COVID-19 VIRUS NOT DETECTED: ICD-10-CM

## 2020-05-06 DIAGNOSIS — J44.1 COPD EXACERBATION: Primary | ICD-10-CM

## 2020-05-06 LAB
ALBUMIN SERPL BCP-MCNC: 3.3 G/DL (ref 3.5–5.2)
ALP SERPL-CCNC: 95 U/L (ref 55–135)
ALT SERPL W/O P-5'-P-CCNC: 57 U/L (ref 10–44)
ANION GAP SERPL CALC-SCNC: 12 MMOL/L (ref 8–16)
AST SERPL-CCNC: 88 U/L (ref 10–40)
BASOPHILS # BLD AUTO: 0.08 K/UL (ref 0–0.2)
BASOPHILS NFR BLD: 1.3 % (ref 0–1.9)
BILIRUB SERPL-MCNC: 0.4 MG/DL (ref 0.1–1)
BNP SERPL-MCNC: 15 PG/ML (ref 0–99)
BUN SERPL-MCNC: 8 MG/DL (ref 6–20)
CALCIUM SERPL-MCNC: 8.2 MG/DL (ref 8.7–10.5)
CHLORIDE SERPL-SCNC: 101 MMOL/L (ref 95–110)
CK SERPL-CCNC: 108 U/L (ref 20–200)
CO2 SERPL-SCNC: 24 MMOL/L (ref 23–29)
CREAT SERPL-MCNC: 0.8 MG/DL (ref 0.5–1.4)
CRP SERPL-MCNC: 11.2 MG/L (ref 0–8.2)
DIFFERENTIAL METHOD: ABNORMAL
EOSINOPHIL # BLD AUTO: 0.2 K/UL (ref 0–0.5)
EOSINOPHIL NFR BLD: 2.6 % (ref 0–8)
ERYTHROCYTE [DISTWIDTH] IN BLOOD BY AUTOMATED COUNT: 15.9 % (ref 11.5–14.5)
EST. GFR  (AFRICAN AMERICAN): >60 ML/MIN/1.73 M^2
EST. GFR  (NON AFRICAN AMERICAN): >60 ML/MIN/1.73 M^2
FERRITIN SERPL-MCNC: 889 NG/ML (ref 20–300)
GLUCOSE SERPL-MCNC: 73 MG/DL (ref 70–110)
HCT VFR BLD AUTO: 38.8 % (ref 40–54)
HGB BLD-MCNC: 12.4 G/DL (ref 14–18)
IMM GRANULOCYTES # BLD AUTO: 0.03 K/UL (ref 0–0.04)
IMM GRANULOCYTES NFR BLD AUTO: 0.5 % (ref 0–0.5)
LACTATE SERPL-SCNC: 1.2 MMOL/L (ref 0.5–2.2)
LDH SERPL L TO P-CCNC: 221 U/L (ref 110–260)
LYMPHOCYTES # BLD AUTO: 2 K/UL (ref 1–4.8)
LYMPHOCYTES NFR BLD: 32 % (ref 18–48)
MCH RBC QN AUTO: 27.7 PG (ref 27–31)
MCHC RBC AUTO-ENTMCNC: 32 G/DL (ref 32–36)
MCV RBC AUTO: 87 FL (ref 82–98)
MONOCYTES # BLD AUTO: 0.5 K/UL (ref 0.3–1)
MONOCYTES NFR BLD: 8.5 % (ref 4–15)
NEUTROPHILS # BLD AUTO: 3.5 K/UL (ref 1.8–7.7)
NEUTROPHILS NFR BLD: 55.1 % (ref 38–73)
NRBC BLD-RTO: 0 /100 WBC
PLATELET # BLD AUTO: 171 K/UL (ref 150–350)
PMV BLD AUTO: 10.8 FL (ref 9.2–12.9)
POTASSIUM SERPL-SCNC: 3.8 MMOL/L (ref 3.5–5.1)
PROCALCITONIN SERPL IA-MCNC: 0.05 NG/ML
PROT SERPL-MCNC: 7 G/DL (ref 6–8.4)
RBC # BLD AUTO: 4.47 M/UL (ref 4.6–6.2)
SARS-COV-2 RDRP RESP QL NAA+PROBE: NEGATIVE
SODIUM SERPL-SCNC: 137 MMOL/L (ref 136–145)
TROPONIN I SERPL DL<=0.01 NG/ML-MCNC: 0.02 NG/ML (ref 0–0.03)
WBC # BLD AUTO: 6.25 K/UL (ref 3.9–12.7)

## 2020-05-06 PROCEDURE — 27000221 HC OXYGEN, UP TO 24 HOURS

## 2020-05-06 PROCEDURE — 86140 C-REACTIVE PROTEIN: CPT

## 2020-05-06 PROCEDURE — 83615 LACTATE (LD) (LDH) ENZYME: CPT

## 2020-05-06 PROCEDURE — 84484 ASSAY OF TROPONIN QUANT: CPT

## 2020-05-06 PROCEDURE — 80053 COMPREHEN METABOLIC PANEL: CPT

## 2020-05-06 PROCEDURE — 84145 PROCALCITONIN (PCT): CPT

## 2020-05-06 PROCEDURE — 82728 ASSAY OF FERRITIN: CPT

## 2020-05-06 PROCEDURE — 85025 COMPLETE CBC W/AUTO DIFF WBC: CPT

## 2020-05-06 PROCEDURE — 99285 EMERGENCY DEPT VISIT HI MDM: CPT | Mod: 25

## 2020-05-06 PROCEDURE — 87040 BLOOD CULTURE FOR BACTERIA: CPT

## 2020-05-06 PROCEDURE — 83880 ASSAY OF NATRIURETIC PEPTIDE: CPT

## 2020-05-06 PROCEDURE — 83605 ASSAY OF LACTIC ACID: CPT

## 2020-05-06 PROCEDURE — 36000 PLACE NEEDLE IN VEIN: CPT

## 2020-05-06 PROCEDURE — U0002 COVID-19 LAB TEST NON-CDC: HCPCS

## 2020-05-06 PROCEDURE — 93010 EKG 12-LEAD: ICD-10-PCS | Mod: ,,, | Performed by: INTERNAL MEDICINE

## 2020-05-06 PROCEDURE — 93005 ELECTROCARDIOGRAM TRACING: CPT

## 2020-05-06 PROCEDURE — 99285 EMERGENCY DEPT VISIT HI MDM: CPT | Mod: ,,, | Performed by: EMERGENCY MEDICINE

## 2020-05-06 PROCEDURE — 93010 ELECTROCARDIOGRAM REPORT: CPT | Mod: ,,, | Performed by: INTERNAL MEDICINE

## 2020-05-06 PROCEDURE — 82550 ASSAY OF CK (CPK): CPT

## 2020-05-06 PROCEDURE — 99285 PR EMERGENCY DEPT VISIT,LEVEL V: ICD-10-PCS | Mod: ,,, | Performed by: EMERGENCY MEDICINE

## 2020-05-06 RX ORDER — ALBUTEROL SULFATE 90 UG/1
2 AEROSOL, METERED RESPIRATORY (INHALATION) ONCE
Status: DISCONTINUED | OUTPATIENT
Start: 2020-05-06 | End: 2020-05-06

## 2020-05-06 RX ORDER — PREDNISONE 20 MG/1
40 TABLET ORAL DAILY
Qty: 4 TABLET | Refills: 0 | OUTPATIENT
Start: 2020-05-06 | End: 2020-05-12

## 2020-05-06 RX ORDER — ALBUTEROL SULFATE 90 UG/1
2 AEROSOL, METERED RESPIRATORY (INHALATION) EVERY 6 HOURS PRN
Qty: 8.5 G | Refills: 2 | Status: SHIPPED | OUTPATIENT
Start: 2020-05-06 | End: 2020-05-30 | Stop reason: SDUPTHER

## 2020-05-06 RX ORDER — AMOXICILLIN AND CLAVULANATE POTASSIUM 875; 125 MG/1; MG/1
1 TABLET, FILM COATED ORAL 2 TIMES DAILY
Qty: 14 TABLET | Refills: 0 | Status: SHIPPED | OUTPATIENT
Start: 2020-05-06 | End: 2020-06-04 | Stop reason: ALTCHOICE

## 2020-05-06 NOTE — ED TRIAGE NOTES
Pt coming in for SOB that started while on the bus going to work. EMS stated 100% on RA. History of COPD, uses oxygen at night    LOC: The patient is awake, alert, and oriented to place, time, situation. Affect is appropriate.  Speech is appropriate and clear.     APPEARANCE: Patient resting comfortably in no acute distress.  Patient is clean and well groomed.    SKIN: The skin is warm and dry; color consistent with ethnicity.  Patient has normal skin turgor and moist mucus membranes.  Skin intact; no breakdown or bruising noted.     MUSCULOSKELETAL: Patient moving upper and lower extremities without difficulty.  Denies weakness.     RESPIRATORY: Airway is open and patent. Respirations spontaneous, even, and non-labored.  Patient has a normal rate. SOB noted. No accessory muscle use noted. Denies cough.     CARDIAC:  Normal rhythm and rate noted.  No peripheral edema noted. No complaints of chest pain.      ABDOMEN: Soft and non tender to palpation.  No distention noted.     NEUROLOGIC: Eyes open spontaneously.  Behavior appropriate to situation.  Follows commands; facial expression symmetrical.  Purposeful motor response noted; normal sensation in all extremities.

## 2020-05-06 NOTE — PROVIDER PROGRESS NOTES - EMERGENCY DEPT.
Encounter Date: 5/6/2020    ED Physician Progress Notes        Physician Note:   ED Physician Hand-off Note:    ED Course: I assumed care of patient from off-going ED physician team. Briefly, Patient is a 59 M hx of COPD, hypertension, seizure disorder, CHF here for SOB.  COVID negative.  CXR with chronic changes.  Labs w/slight AST/ALT, CRP elevation.    Pt has home oxygen, uses it PRN.    At the time of signout plan was pending albuterol MDI and re-evaluation.    4:50 PM    Patient re-evaluated, states he did not want the albuterol MDI.  Room air saturation 97 %.  He is requesting discharge.      Medications given in the ED:    Medications  albuterol inhaler 2 puff (has no administration in time range)    Disposition: discharge    Patient comfortable with plan. Patient counseled regarding exam, results, diagnosis, treatment, and plan.    Impression: COPD exacerbation, COVID negative

## 2020-05-06 NOTE — LETTER
Shadi Castellano M.D.  System , Emergency Medical Services    Jose Juan Wilson M.D.  , Emergency Medical Services    Luis Enrique Schulte M.D.  Associate     Ko Hernandez M.D.    Director, Billing and Coding    Darrell Leo M.D.    Cj Crane M.D.    Manuel Bailey M.D.    Wellington Ndiaye M.D.    Cain Graves M.D., Peds ER    Hung Light M.D.    KEATON Patel M.D. Robert Link, M.D.    Maykel Vivas M.D., Peds ER    Trena Aguirre M.D.    KEATON Hutchins M.D. Charles Sea, M.D.  Director, Informatics    Jazmín Schofield M.D.    Yoel Sheldon M.D.    Latisha Ochoa M.D.  Director, Patient Satisfaction    Alina Hannon M.D.    Compa Martinez Jr., M.D.    Jude Reese M.D.    Alec Carey M.D.    Eh Green M.D., [facility]     05/08/2020      CERTIFIED-RETURN RECEIPT REQUESTED          Mr. Baltazar Mccray  [address] [place] [place] [id number]    YOB: 1960  MRN:               [id number]      Dear Mr. Ledesma:    Multiple attempts to contact you via the provided telephone have been unsuccessful. This written correspondence is to inform you that your test results from your recent visit have abnormal results. Please return to the emergency room immediately or call [phone] and ask to speak with a clinician in [facility] for further information.    Sincerely,  Jacqui Herbert PA-C      _______________________________  Data Unavailable                      [address] [facility] [address] [address]  Phone: [phone]  Fax: [phone]

## 2020-05-06 NOTE — ED PROVIDER NOTES
Encounter Date: 5/6/2020    SCRIBE #1 NOTE: I, Antoinette Baird, am scribing for, and in the presence of,  Dr. Vidal. I have scribed the following portions of the note - the EKG reading. Other sections scribed: HPI ROS PE Chest XR.       History     Chief Complaint   Patient presents with    Shortness of Breath     arrived by EMS, 100% on RA     The patient is a 59 y.o. male with past medical history of COPD, hypertension, seizure disorder and heart failure, who presents to the ED with a complaint of shortness of breath. Reports he woke up this morning feeling okay but as he got off the bus on his way to work, he got progressively short of breath, dyspnea on exertion. No fever no chills, nausea, vomiting or diarrhea. States slight cough is nonproductive and states this is at his baseline.   Patient reports that he uses oxygen at home, he does not know what his baseline saturation is, states that he goes off of his oxygen to go work and then he gets on his oxygen as soon as he gets home.  States that he does get short of breath while at work, but that is not interfered with his work nor has he   Had to leave work for his shortness of breath.    The history is provided by the patient and medical records.     Review of patient's allergies indicates:   Allergen Reactions    Benazepril Swelling    Duoneb [ipratropium-albuterol]      Report Tremors     Past Medical History:   Diagnosis Date    Asthma     COPD (chronic obstructive pulmonary disease)     home O2 at night only    Coronary artery disease     Hypertension     Pneumonia     Seizures      Past Surgical History:   Procedure Laterality Date    LEFT HEART CATHETERIZATION  4/23/2020    Procedure: Left heart cath;  Surgeon: Nic Pollack MD;  [facility] C[facility] LAB;  Service: Cardiology;;     Family History   Problem Relation Age of Onset    Cancer Father     Hypertension Sister     Stroke Sister     Stroke Brother     Diabetes Neg Hx      Heart disease Neg Hx      Social History     Tobacco Use    Smoking status: Current Every Day Smoker     Packs/day: 0.25     Types: Cigarettes    Smokeless tobacco: Never Used    Tobacco comment: 1-2 cigs per day   Substance Use Topics    Alcohol use: Yes     Alcohol/week: 2.0 standard drinks     Types: 2 Cans of beer per week     Comment: every night    Drug use: No     Review of Systems   Constitutional: Negative for chills and fever.   HENT: Negative for rhinorrhea, sore throat and trouble swallowing.    Respiratory: Positive for shortness of breath. Negative for chest tightness and wheezing.    Cardiovascular: Negative for chest pain, palpitations and leg swelling.   Gastrointestinal: Negative for abdominal pain, diarrhea, nausea and vomiting.   Genitourinary: Negative for dysuria.   Neurological: Negative for speech difficulty and light-headedness.   All other systems reviewed and are negative.      Physical Exam     Initial Vitals [05/06/20 1325]   BP Pulse Resp Temp SpO2   (!) 170/100 72 20 98.6 °F (37 °C) 100 %      MAP       --         Physical Exam    Nursing note and vitals reviewed.  Constitutional: He appears well-developed and well-nourished. He is not diaphoretic. No distress.   RA Sat of 88%   HENT:   Head: Normocephalic and atraumatic.   Right Ear: External ear normal.   Left Ear: External ear normal.   Nose: Nose normal.   Eyes: Conjunctivae and EOM are normal. Pupils are equal, round, and reactive to light. No scleral icterus.   Neck: Normal range of motion. No tracheal deviation present. No JVD present.   Cardiovascular: Normal rate, regular rhythm, normal heart sounds and intact distal pulses. Exam reveals no gallop and no friction rub.    No murmur heard.  Pulmonary/Chest: Breath sounds normal. No respiratory distress. He has no wheezes. He has no rhonchi. He has no rales. He exhibits no tenderness.   Abdominal: Soft. Bowel sounds are normal. He exhibits no distension and no mass. There is  no tenderness. There is no rebound and no guarding.   Musculoskeletal: Normal range of motion. He exhibits no edema or tenderness.   Lymphadenopathy:     He has no cervical adenopathy.   Neurological: He is alert and oriented to person, place, and time. He has normal strength and normal reflexes. He displays normal reflexes. No cranial nerve deficit or sensory deficit.   Skin: Skin is warm and dry. Capillary refill takes less than 2 seconds. No rash noted. No erythema.   Psychiatric: He has a normal mood and affect. Thought content normal.         ED Course   Procedures  Labs Reviewed   CBC W/ AUTO DIFFERENTIAL - Abnormal; Notable for the following components:       Result Value    RBC 4.47 (*)     Hemoglobin 12.4 (*)     Hematocrit 38.8 (*)     RDW 15.9 (*)     All other components within normal limits   COMPREHENSIVE METABOLIC PANEL - Abnormal; Notable for the following components:    Calcium 8.2 (*)     Albumin 3.3 (*)     AST 88 (*)     ALT 57 (*)     All other components within normal limits   C-REACTIVE PROTEIN - Abnormal; Notable for the following components:    CRP 11.2 (*)     All other components within normal limits   FERRITIN - Abnormal; Notable for the following components:    Ferritin 889 (*)     All other components within normal limits   CULTURE, BLOOD    Narrative:     Aerobic and anaerobic   CULTURE, BLOOD    Narrative:     Aerobic and anaerobic   LACTATE DEHYDROGENASE   CK   LACTIC ACID, PLASMA   TROPONIN I   SARS-COV-2 RNA AMPLIFICATION, QUAL    Narrative:     What symptom criteria does the patient meet?->Shortness of  breath or difficulty breathing   PROCALCITONIN   B-TYPE NATRIURETIC PEPTIDE     EKG Readings: (Independently Interpreted)   Initial Reading: No STEMI. Rhythm: Normal Sinus Rhythm. Heart Rate: 68.   Nonspecific ST changes       Imaging Results          X-Ray Chest AP Portable (Final result)  Result time 05/06/20 14:48:55    Final result by Mckinley Talbert MD (05/06/20 14:48:55)                  Impression:      Radiographic findings suggestive of COPD, without radiographic evidence of pneumonia or other source of cough/shortness of breath, noting that early/mild viral pneumonia may be radiographically occult.      Electronically signed by: Mckinley Talbert MD  Date:    05/06/2020  Time:    14:48             Narrative:    EXAMINATION:  XR CHEST AP PORTABLE    CLINICAL HISTORY:  Suspected Covid-19 Virus Infection;    TECHNIQUE:  AP portable view of the chest.    COMPARISON:  Chest radiograph 04/19/2020    FINDINGS:  No detrimental change.  Cardiomediastinal silhouette is midline and within normal limits for age noting calcification of the arch.  Pulmonary vasculature and hilar contours are within normal limits.  Few scattered linear opacities most prominent at the right midlung consistent with minimal platelike scarring versus atelectasis.  The lungs are otherwise symmetrically hyperexpanded without large consolidation or new focal opacity.  No pleural effusion or pneumothorax.  Osseous structures appear intact.  PA and lateral views can be obtained.                              X-Rays:   Independently Interpreted Readings:   Chest X-Ray: No focal infiltrates. Chronic changes. No pneumothorax     Medical Decision Making:   History:   Old Medical Records: I decided to obtain old medical records.  Differential Diagnosis:   Asthma exacerbation, COPD exacerbation, pulmonary edema, aspiration, pulmonary embolism, acute bronchitis, carcinoid syndrome, bronchiectasis, parasitic lung infection, malignancy, anaphylaxis, CHF Exacerbation, COVID19    Independently Interpreted Test(s):   I have ordered and independently interpreted X-rays - see prior notes.  I have ordered and independently interpreted EKG Reading(s) - see prior notes  Clinical Tests:   Lab Tests: Ordered and Reviewed  Radiological Study: Ordered and Reviewed  Medical Tests: Ordered and Reviewed  ED Management:    59-year-old male developed  shortness of breath on his way to work which she believes is secondary to a COPD exacerbation as he has not felt sick otherwise.  His COVID test is negative, his chest x-ray was clear for any new focal infiltrates.  On re-evaluation the  His room air saturation was 94%, and the patient did have slight wheezing.  Discussed with patient Obs versus discharge, patient was adamant that he wanted to be discharged home for follow up and re-evaluation as detailed in the discharge instructions. The patient improved with treatment in the ED and the patient/guardian is comfortable going home. I have discussed the specifics of the workup with the patient/guardian and the patient/guardian has verbalized understanding of the details of the workup, the diagnosis, the treatment plan, and the need for outpatient follow-up.  Although the patient has no emergent etiology today this does not preclude the development of an emergent condition after discharge.  I educated the patient/guardian on the warning signs and symptoms for which they must seek immediate medical attention. I have advised the patient/guardian that they can return to the ED and/or activate EMS at any time with worsening of their symptoms, change of their symptoms, or with any other medical complaints.  Patient's/guardian understands the ED visit today was primarily to address immediate concerns and to rule out emergent causes of the symptoms. They also understand that these symptoms may require further workup and evaluation as an outpatient. I emphasized the importance of followup.  All questions addressed and patient/guardian were given discharge instructions and followup information.               Scribe Attestation:   Scribe #1: I performed the above scribed service and the documentation accurately describes the services I performed. I attest to the accuracy of the note.            ED Course as of May 07 0639   Wed May 06, 2020   1613 Patient comfortable, room air  saturation 93%.  Discussed with patient that his COVID test was negative, suspect he is having COPD exacerbation.  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  Patient does not know what his baseline O2 status. He will received MDI prior to discharge.    [MA]      ED Course User Index  [MA] Victorino Vidal MD                Clinical Impression:       ICD-10-CM ICD-9-CM   1. COPD exacerbation J44.1 491.21   2. Covid-19 Virus not Detected [id number]              ED Disposition Condition    Discharge Stable        ED Prescriptions     Medication Sig Dispense Start Date End Date Auth. Provider    albuterol (PROVENTIL HFA) 90 mcg/actuation inhaler Inhale 2 puffs into the lungs every 6 (six) hours as needed for Wheezing. Rescue 8.5 g 5/6/2020 8/4/2020 Victorino Vidal MD    amoxicillin-clavulanate 875-125mg (AUGMENTIN) 875-125 mg per tablet Take 1 tablet by mouth 2 (two) times daily. 14 tablet 5/6/2020  Victorino Vidal MD    predniSONE (DELTASONE) 20 MG tablet Take 2 tablets (40 mg total) by mouth once daily. 4 tablet 5/6/2020  Victorino Vidal MD        Follow-up Information     Follow up With Specialties Details Why Contact Info    [facility]  Schedule an appointment as soon as possible for a visit in 3 days For follow up and re-evaluation [address]  [facility] [address]  [phone]      [facility] Emergency Medicine  As needed, for any new or worsening symptoms [address] [facility] [address] [phone]                                     Victorino Vidal MD  05/07/20 0602

## 2020-05-09 LAB
BACTERIA BLD CULT: ABNORMAL

## 2020-05-10 LAB — BACTERIA BLD CULT: NORMAL

## 2020-05-12 ENCOUNTER — HOSPITAL ENCOUNTER (EMERGENCY)
Facility: HOSPITAL | Age: 60
Discharge: HOME | End: 2020-05-12
Attending: EMERGENCY MEDICINE
Payer: MEDICAID

## 2020-05-12 VITALS
TEMPERATURE: 98 F | RESPIRATION RATE: 20 BRPM | HEART RATE: 66 BPM | SYSTOLIC BLOOD PRESSURE: 103 MMHG | DIASTOLIC BLOOD PRESSURE: 57 MMHG | OXYGEN SATURATION: 100 %

## 2020-05-12 DIAGNOSIS — R06.02 SOB (SHORTNESS OF BREATH): Primary | ICD-10-CM

## 2020-05-12 DIAGNOSIS — Z20.822 SUSPECTED COVID-19 VIRUS INFECTION: ICD-10-CM

## 2020-05-12 DIAGNOSIS — J44.1 COPD EXACERBATION: ICD-10-CM

## 2020-05-12 LAB
ALBUMIN SERPL BCP-MCNC: 3.2 G/DL (ref 3.5–5.2)
ALLENS TEST: ABNORMAL
ALP SERPL-CCNC: 73 U/L (ref 55–135)
ALT SERPL W/O P-5'-P-CCNC: 43 U/L (ref 10–44)
ANION GAP SERPL CALC-SCNC: 11 MMOL/L (ref 8–16)
AST SERPL-CCNC: 67 U/L (ref 10–40)
BASOPHILS # BLD AUTO: 0.01 K/UL (ref 0–0.2)
BASOPHILS NFR BLD: 0.1 % (ref 0–1.9)
BILIRUB SERPL-MCNC: 0.3 MG/DL (ref 0.1–1)
BNP SERPL-MCNC: 37 PG/ML (ref 0–99)
BUN SERPL-MCNC: 13 MG/DL (ref 6–20)
CALCIUM SERPL-MCNC: 8 MG/DL (ref 8.7–10.5)
CHLORIDE SERPL-SCNC: 99 MMOL/L (ref 95–110)
CK SERPL-CCNC: 77 U/L (ref 20–200)
CO2 SERPL-SCNC: 22 MMOL/L (ref 23–29)
CREAT SERPL-MCNC: 1 MG/DL (ref 0.5–1.4)
CRP SERPL-MCNC: 8 MG/L (ref 0–8.2)
DELSYS: ABNORMAL
DIFFERENTIAL METHOD: ABNORMAL
EOSINOPHIL # BLD AUTO: 0 K/UL (ref 0–0.5)
EOSINOPHIL NFR BLD: 0.4 % (ref 0–8)
ERYTHROCYTE [DISTWIDTH] IN BLOOD BY AUTOMATED COUNT: 15.4 % (ref 11.5–14.5)
EST. GFR  (AFRICAN AMERICAN): >60 ML/MIN/1.73 M^2
EST. GFR  (NON AFRICAN AMERICAN): >60 ML/MIN/1.73 M^2
FERRITIN SERPL-MCNC: 970 NG/ML (ref 20–300)
GLUCOSE SERPL-MCNC: 79 MG/DL (ref 70–110)
HCO3 UR-SCNC: 20.3 MMOL/L (ref 24–28)
HCT VFR BLD AUTO: 36.7 % (ref 40–54)
HGB BLD-MCNC: 12.1 G/DL (ref 14–18)
IMM GRANULOCYTES # BLD AUTO: 0.03 K/UL (ref 0–0.04)
IMM GRANULOCYTES NFR BLD AUTO: 0.4 % (ref 0–0.5)
LACTATE SERPL-SCNC: 1.3 MMOL/L (ref 0.5–2.2)
LDH SERPL L TO P-CCNC: 190 U/L (ref 110–260)
LYMPHOCYTES # BLD AUTO: 3.2 K/UL (ref 1–4.8)
LYMPHOCYTES NFR BLD: 43.4 % (ref 18–48)
MCH RBC QN AUTO: 27.5 PG (ref 27–31)
MCHC RBC AUTO-ENTMCNC: 33 G/DL (ref 32–36)
MCV RBC AUTO: 83 FL (ref 82–98)
MODE: ABNORMAL
MONOCYTES # BLD AUTO: 0.7 K/UL (ref 0.3–1)
MONOCYTES NFR BLD: 9.2 % (ref 4–15)
NEUTROPHILS # BLD AUTO: 3.4 K/UL (ref 1.8–7.7)
NEUTROPHILS NFR BLD: 46.5 % (ref 38–73)
NRBC BLD-RTO: 0 /100 WBC
PCO2 BLDA: 36.8 MMHG (ref 35–45)
PH SMN: 7.35 [PH] (ref 7.35–7.45)
PLATELET # BLD AUTO: 248 K/UL (ref 150–350)
PMV BLD AUTO: 11 FL (ref 9.2–12.9)
PO2 BLDA: 65 MMHG (ref 40–60)
POC BE: -5 MMOL/L
POC SATURATED O2: 91 % (ref 95–100)
POC TCO2: 21 MMOL/L (ref 24–29)
POTASSIUM SERPL-SCNC: 3.6 MMOL/L (ref 3.5–5.1)
PROCALCITONIN SERPL IA-MCNC: 0.06 NG/ML
PROT SERPL-MCNC: 6.8 G/DL (ref 6–8.4)
RBC # BLD AUTO: 4.4 M/UL (ref 4.6–6.2)
SAMPLE: ABNORMAL
SARS-COV-2 RDRP RESP QL NAA+PROBE: NEGATIVE
SITE: ABNORMAL
SODIUM SERPL-SCNC: 132 MMOL/L (ref 136–145)
TROPONIN I SERPL DL<=0.01 NG/ML-MCNC: 0.02 NG/ML (ref 0–0.03)
WBC # BLD AUTO: 7.38 K/UL (ref 3.9–12.7)

## 2020-05-12 PROCEDURE — 99900035 HC TECH TIME PER 15 MIN (STAT)

## 2020-05-12 PROCEDURE — 80053 COMPREHEN METABOLIC PANEL: CPT

## 2020-05-12 PROCEDURE — 99285 EMERGENCY DEPT VISIT HI MDM: CPT | Mod: ,,, | Performed by: EMERGENCY MEDICINE

## 2020-05-12 PROCEDURE — 94640 AIRWAY INHALATION TREATMENT: CPT

## 2020-05-12 PROCEDURE — 83605 ASSAY OF LACTIC ACID: CPT

## 2020-05-12 PROCEDURE — 85025 COMPLETE CBC W/AUTO DIFF WBC: CPT

## 2020-05-12 PROCEDURE — 83880 ASSAY OF NATRIURETIC PEPTIDE: CPT

## 2020-05-12 PROCEDURE — U0002 COVID-19 LAB TEST NON-CDC: HCPCS

## 2020-05-12 PROCEDURE — 86140 C-REACTIVE PROTEIN: CPT

## 2020-05-12 PROCEDURE — 63600175 PHARM REV CODE 636 W HCPCS: Performed by: EMERGENCY MEDICINE

## 2020-05-12 PROCEDURE — 82550 ASSAY OF CK (CPK): CPT

## 2020-05-12 PROCEDURE — 93010 EKG 12-LEAD: ICD-10-PCS | Mod: ,,, | Performed by: INTERNAL MEDICINE

## 2020-05-12 PROCEDURE — 93005 ELECTROCARDIOGRAM TRACING: CPT

## 2020-05-12 PROCEDURE — 25000242 PHARM REV CODE 250 ALT 637 W/ HCPCS: Performed by: EMERGENCY MEDICINE

## 2020-05-12 PROCEDURE — 82728 ASSAY OF FERRITIN: CPT

## 2020-05-12 PROCEDURE — 83615 LACTATE (LD) (LDH) ENZYME: CPT

## 2020-05-12 PROCEDURE — 82803 BLOOD GASES ANY COMBINATION: CPT

## 2020-05-12 PROCEDURE — 93010 ELECTROCARDIOGRAM REPORT: CPT | Mod: ,,, | Performed by: INTERNAL MEDICINE

## 2020-05-12 PROCEDURE — 84145 PROCALCITONIN (PCT): CPT

## 2020-05-12 PROCEDURE — 96374 THER/PROPH/DIAG INJ IV PUSH: CPT

## 2020-05-12 PROCEDURE — 84484 ASSAY OF TROPONIN QUANT: CPT

## 2020-05-12 PROCEDURE — 99285 PR EMERGENCY DEPT VISIT,LEVEL V: ICD-10-PCS | Mod: ,,, | Performed by: EMERGENCY MEDICINE

## 2020-05-12 PROCEDURE — 99285 EMERGENCY DEPT VISIT HI MDM: CPT | Mod: 25

## 2020-05-12 PROCEDURE — 87040 BLOOD CULTURE FOR BACTERIA: CPT | Mod: 59

## 2020-05-12 RX ORDER — PREDNISONE 20 MG/1
40 TABLET ORAL DAILY
Qty: 10 TABLET | Refills: 0 | Status: SHIPPED | OUTPATIENT
Start: 2020-05-12 | End: 2020-05-17

## 2020-05-12 RX ORDER — METHYLPREDNISOLONE SOD SUCC 125 MG
125 VIAL (EA) INJECTION
Status: COMPLETED | OUTPATIENT
Start: 2020-05-12 | End: 2020-05-12

## 2020-05-12 RX ORDER — ALBUTEROL SULFATE 2.5 MG/.5ML
1.25 SOLUTION RESPIRATORY (INHALATION)
Status: COMPLETED | OUTPATIENT
Start: 2020-05-12 | End: 2020-05-12

## 2020-05-12 RX ORDER — AZITHROMYCIN 250 MG/1
250 TABLET, FILM COATED ORAL DAILY
Qty: 5 TABLET | Refills: 0 | Status: SHIPPED | OUTPATIENT
Start: 2020-05-12 | End: 2020-05-17

## 2020-05-12 RX ADMIN — ALBUTEROL SULFATE 1.25 MG: 2.5 SOLUTION RESPIRATORY (INHALATION) at 02:05

## 2020-05-12 RX ADMIN — METHYLPREDNISOLONE SODIUM SUCCINATE 125 MG: 125 INJECTION, POWDER, FOR SOLUTION INTRAMUSCULAR; INTRAVENOUS at 03:05

## 2020-05-12 NOTE — DISCHARGE INSTRUCTIONS
Today your evaluation included ECG, labs, chest x-ray and COVID-19 swab.  Your negative for COVID-19, chest x-ray does not show active pneumonia but you do have COPD exacerbation.  ECG negative for cardiac.  We will place you on prednisone and antibiotic for your COPD exacerbation.  Please follow-up with your primary care for repeat evaluation.    Our goal in the emergency department is to always give you outstanding care and exceptional service. You may receive a survey by mail or e-mail in the next week regarding your experience in our ED. We would greatly appreciate your completing and returning the survey. Your feedback provides us with a way to recognize our staff who give very good care and it helps us learn how to improve when your experience was below our aspiration of excellence.

## 2020-05-12 NOTE — ED PROVIDER NOTES
Encounter Date: 5/12/2020    SCRIBE #1 NOTE: I, Antoinette Baird, am scribing for, and in the presence of,  Dr. Munoz. I have scribed the entire note.       History     Chief Complaint   Patient presents with    Shortness of Breath     Time patient was seen by the provider: 1:32 PM      The patient is a 59 y.o. male with past medical history of HTN, asthma, COPD, CAD, pneumonia, who presents to the ED with a complaint of worsening shortness of breath after waking up this morning. The patient reports he has been having shortness of breath for a few weeks. Denies nausea, vomiting, diarrhea, abdominal pain, chest pain, light-headedness, dizziness. The patient uses albuterol inhaler. He states he quit smoking 2 weeks ago.     The history is provided by the patient and medical records.     Review of patient's allergies indicates:   Allergen Reactions    Benazepril Swelling    Duoneb [ipratropium-albuterol]      Report Tremors     Past Medical History:   Diagnosis Date    Asthma     COPD (chronic obstructive pulmonary disease)     home O2 at night only    Coronary artery disease     Hypertension     Pneumonia     Seizures      Past Surgical History:   Procedure Laterality Date    LEFT HEART CATHETERIZATION  4/23/2020    Procedure: Left heart cath;  Surgeon: Nic Pollack MD;  Location: [facility] CATH LAB;  Service: Cardiology;;     Family History   Problem Relation Age of Onset    Cancer Father     Hypertension Sister     Stroke Sister     Stroke Brother     Diabetes Neg Hx     Heart disease Neg Hx      Social History     Tobacco Use    Smoking status: Current Every Day Smoker     Packs/day: 0.25     Types: Cigarettes    Smokeless tobacco: Never Used    Tobacco comment: 1-2 cigs per day   Substance Use Topics    Alcohol use: Yes     Alcohol/week: 2.0 standard drinks     Types: 2 Cans of beer per week     Comment: every night    Drug use: No     Review of Systems   Constitutional: Negative for fever.    HENT: Negative for sore throat.    Respiratory: Positive for shortness of breath.    Cardiovascular: Negative for chest pain.   Gastrointestinal: Negative for abdominal pain, diarrhea, nausea and vomiting.   Genitourinary: Negative for dysuria.   Musculoskeletal: Negative for back pain.   Skin: Negative for rash.   Neurological: Negative for dizziness and light-headedness.   Hematological: Does not bruise/bleed easily.       Physical Exam     Initial Vitals [05/12/20 1315]   BP Pulse Resp Temp SpO2   (!) 145/50 (!) 16 (!) 22 98.5 °F (36.9 °C) (!) 94 %      MAP       --         Physical Exam    Nursing note and vitals reviewed.    Gen/Constitutional: Interactive. No acute distress  Head: Normocephalic, Atraumatic  Neck: supple, no masses or LAD, no JVD  Eyes: PERRLA, conjunctiva clear  Ears, Nose and Throat: No rhinorrhea or stridor.  Cardiac:  Regular rate, Reg Rhythm, No murmur  Pulmonary:  Occasional expiratory wheezes, tachypnea, no increased work of breathing  GI: Abdomen soft, non-tender, non-distended; no rebound or guarding  : No CVA tenderness.  Musculoskeletal: Extremities warm, well perfused, no erythema, no edema  Skin: No rashes, cyanosis or jaundice  Neuro: Alert and Oriented x 3; No focal motor or sensory deficits.    Psych: Normal affect      ED Course   Procedures  Labs Reviewed   CBC W/ AUTO DIFFERENTIAL - Abnormal; Notable for the following components:       Result Value    RBC 4.40 (*)     Hemoglobin 12.1 (*)     Hematocrit 36.7 (*)     RDW 15.4 (*)     All other components within normal limits   COMPREHENSIVE METABOLIC PANEL - Abnormal; Notable for the following components:    Sodium 132 (*)     CO2 22 (*)     Calcium 8.0 (*)     Albumin 3.2 (*)     AST 67 (*)     All other components within normal limits   FERRITIN - Abnormal; Notable for the following components:    Ferritin 970 (*)     All other components within normal limits   ISTAT PROCEDURE - Abnormal; Notable for the following  components:    POC PO2 65 (*)     POC HCO3 20.3 (*)     POC SATURATED O2 91 (*)     POC TCO2 21 (*)     All other components within normal limits   CULTURE, BLOOD    Narrative:     Aerobic and anaerobic   CULTURE, BLOOD    Narrative:     Aerobic and anaerobic   C-REACTIVE PROTEIN   LACTATE DEHYDROGENASE   CK   LACTIC ACID, PLASMA   TROPONIN I   SARS-COV-2 RNA AMPLIFICATION, QUAL    Narrative:     What symptom criteria does the patient meet?->Shortness of  breath or difficulty breathing   PROCALCITONIN   B-TYPE NATRIURETIC PEPTIDE     EKG Readings: (Independently Interpreted)   Initial Reading: No STEMI. Previous EKG: Compared with most recent EKG Rhythm: Normal Sinus Rhythm. Heart Rate: 83. Ectopy: No Ectopy. ST Segments: Non-Specific ST Segment Depression. Other Findings: Prolonged QT Interval.     ECG Results          EKG 12-lead (Final result)  Result time 05/12/20 16:37:27    Final result by Interface, Lab In [facility] (05/12/20 16:37:27)                 Narrative:    Test Reason : R68.89,    Vent. Rate : 083 BPM     Atrial Rate : 083 BPM     P-R Int : 170 ms          QRS Dur : 096 ms      QT Int : 426 ms       P-R-T Axes : 075 077 080 degrees     QTc Int : 500 ms    Normal sinus rhythm  Right atrial enlargement  Prolonged QT  Abnormal ECG  When compared with ECG of 06-MAY-2020 14:13,  QT has lengthened  Confirmed by DANIELLA STEPHENSON MD ([id number]) on 5/12/2020 4:37:18 PM    Referred By: [id number]   SELF           Confirmed By:DANIELLA STEPHENSON MD                            Imaging Results          X-Ray Chest AP Portable (Final result)  Result time 05/12/20 14:51:55    Final result by Michelle Perez MD (05/12/20 14:51:55)                 Impression:      Findings compatible with chronic obstructive pulmonary disease.  The left lower lobe nodular focus likely represents a nipple shadow.  For confirmation, if repeat radiograph with nipple markers can be performed.      Electronically signed by: Michelle Perez  MD  Date:    05/12/2020  Time:    14:51             Narrative:    EXAMINATION:  XR CHEST AP PORTABLE    CLINICAL HISTORY:  Suspected Covid-19 Virus Infection;    TECHNIQUE:  Single frontal view of the chest was performed.    COMPARISON:  05/06/2020    FINDINGS:  The lungs are symmetrically hyperinflated with no mass, nodule, pneumothorax, airspace consolidation or pleural effusion.  Emphysematous changes are identified in the upper lobes.  A nodular opacity overlying the left lower lobe is most compatible with a nipple shadow the cardiomediastinal silhouette, osseous and soft tissue structures are normal.                              X-Rays:   Independently Interpreted Readings:   Chest X-Ray: No infiltrates.  Normal heart size. COPD/emphysematous picture, no infiltrates, no pneumothorax or free air     Medical Decision Making:   History:   I obtained history from: EMS provider.  Old Medical Records: I decided to obtain old medical records.  Old Records Summarized: records from clinic visits and records from previous admission(s).  Initial Assessment:   The patient is a 59 y.o. male with past medical history of HTN, asthma, COPD, CAD, pneumonia, who presents to the ED with a complaint of worsening shortness of breath after waking up this morning.  Differential Diagnosis:   Differential diagnosis includes but is not limited to:  COPD exacerbation, pneumonia, COVID-19, pleural effusion, ACS, PE  Independently Interpreted Test(s):   I have ordered and independently interpreted X-rays - see prior notes.  I have ordered and independently interpreted EKG Reading(s) - see prior notes  Clinical Tests:   Lab Tests: Ordered and Reviewed  Radiological Study: Ordered and Reviewed  Medical Tests: Ordered and Reviewed    Emergent evaluation of patient who developed shortness of breath on his way to work.  He is afebrile vital signs are stable.  He does have some initial tachypnea but he feels that this is likely due COPD  exacerbation as he denies any fevers but is concerned about pneumonia.  His COVID testing is negative, chest x-ray clear for any new focal infiltrates.  However he was recently seen for similar findings.  He continues to have COPD exacerbations.  His room air oxygen saturation was 94%, with slight wheezing.  He was given albuterol nebulized treatment.  Given his chronic nature and multiple core abilities I offered him observation however patient declined and rather be discharged home with follow-up.  Patient was given methylprednisone IV in the ED, with improvement in symptoms.  Did not require any further oxygen prior to discharge.  Patient was given Azithromycin and prednisone 5 day course.  Suspect likely COPD exacerbation.  ECG with no signs ischemia or STEMI on my read.  Remainder of labs unremarkable.  No lactic acidosis, significant leukocytosis or elevated inflammatory markers. Patient agreeable to discharge plan. Strict ED precautions and return instructions discussed at length and patient verbalized understanding. All questions were answered and ample time was given for questions.      Complexity:  High-level 5          Scribe Attestation:   Scribe #1: I performed the above scribed service and the documentation accurately describes the services I performed. I attest to the accuracy of the note.    I, Dr. Guilherme Munoz, personally performed the services described in this documentation. All medical record entries made by the scribe were at my direction and in my presence.  I have reviewed the chart and agree that the record reflects my personal performance and is accurate and complete.                         Clinical Impression:       ICD-10-CM ICD-9-CM   1. SOB (shortness of breath) R06.02 786.05   2. Suspected Covid-19 Virus Infection R68.89    3. COPD exacerbation J44.1 491.21         Disposition:   Disposition: Discharged  Condition: Stable     ED Disposition Condition    Discharge Stable        ED  Prescriptions     Medication Sig Dispense Start Date End Date Auth. Provider    predniSONE (DELTASONE) 20 MG tablet Take 2 tablets (40 mg total) by mouth once daily. for 5 days 10 tablet 5/12/2020 5/17/2020 Guilherme Munoz DO    azithromycin (Z-DIEGO) 250 MG tablet Take 1 tablet (250 mg total) by mouth once daily. Take 1 tablet every day until finished. for 5 days 5 tablet 5/12/2020 5/17/2020 Guilherme Munoz DO        Follow-up Information     Follow up With Specialties Details Why Contact Info    [facility]  Schedule an appointment as soon as possible for a visit in 3 days  [address] [facility] [id number] [phone]                                Guilherme Munoz DO, FAAEM  Emergency Staff Physician   Dept of Emergency Medicine   [facility]  Spectralink: [phone]         Guilherme Munoz DO  05/13/20 0646

## 2020-05-12 NOTE — ED TRIAGE NOTES
Reports feeling short of breath after getting off the bus today. Reports intermittent shortness of breath x 4-5 months. Hx of COPD

## 2020-05-16 LAB
BACTERIA BLD CULT: NORMAL
BACTERIA BLD CULT: NORMAL

## 2020-05-21 ENCOUNTER — TELEPHONE (OUTPATIENT)
Dept: PULMONOLOGY | Facility: CLINIC | Age: 60
End: 2020-05-21

## 2020-05-21 NOTE — TELEPHONE ENCOUNTER
I called Mr Mccray about a appointment for his COPD. He is a frequent visitor to ER but has never been seen in Pulmonary. There is only 1 phone number to call and the recording states you are unable to leave a message call back later, Deana Quintero LPN.

## 2020-05-23 ENCOUNTER — HOSPITAL ENCOUNTER (EMERGENCY)
Facility: HOSPITAL | Age: 60
Discharge: HOME | End: 2020-05-23
Attending: EMERGENCY MEDICINE
Payer: MEDICAID

## 2020-05-23 VITALS
WEIGHT: 119.25 LBS | BODY MASS INDEX: 18.07 KG/M2 | RESPIRATION RATE: 18 BRPM | HEART RATE: 83 BPM | TEMPERATURE: 99 F | OXYGEN SATURATION: 97 % | HEIGHT: 68 IN | SYSTOLIC BLOOD PRESSURE: 103 MMHG | DIASTOLIC BLOOD PRESSURE: 58 MMHG

## 2020-05-23 DIAGNOSIS — J44.1 COPD EXACERBATION: Primary | ICD-10-CM

## 2020-05-23 DIAGNOSIS — R06.02 SOB (SHORTNESS OF BREATH): ICD-10-CM

## 2020-05-23 LAB
ALBUMIN SERPL BCP-MCNC: 3.6 G/DL (ref 3.5–5.2)
ALLENS TEST: ABNORMAL
ALP SERPL-CCNC: 76 U/L (ref 55–135)
ALT SERPL W/O P-5'-P-CCNC: 53 U/L (ref 10–44)
ANION GAP SERPL CALC-SCNC: 11 MMOL/L (ref 8–16)
AST SERPL-CCNC: 48 U/L (ref 10–40)
BASOPHILS # BLD AUTO: 0.06 K/UL (ref 0–0.2)
BASOPHILS NFR BLD: 0.5 % (ref 0–1.9)
BILIRUB SERPL-MCNC: 0.2 MG/DL (ref 0.1–1)
BUN SERPL-MCNC: 10 MG/DL (ref 6–20)
CALCIUM SERPL-MCNC: 8.1 MG/DL (ref 8.7–10.5)
CHLORIDE SERPL-SCNC: 103 MMOL/L (ref 95–110)
CO2 SERPL-SCNC: 26 MMOL/L (ref 23–29)
CREAT SERPL-MCNC: 1.1 MG/DL (ref 0.5–1.4)
DELSYS: ABNORMAL
DIFFERENTIAL METHOD: ABNORMAL
EOSINOPHIL # BLD AUTO: 0.2 K/UL (ref 0–0.5)
EOSINOPHIL NFR BLD: 1.4 % (ref 0–8)
ERYTHROCYTE [DISTWIDTH] IN BLOOD BY AUTOMATED COUNT: 16.9 % (ref 11.5–14.5)
ERYTHROCYTE [SEDIMENTATION RATE] IN BLOOD BY WESTERGREN METHOD: 20 MM/H
EST. GFR  (AFRICAN AMERICAN): >60 ML/MIN/1.73 M^2
EST. GFR  (NON AFRICAN AMERICAN): >60 ML/MIN/1.73 M^2
FIO2: 28
FLOW: 2
GLUCOSE SERPL-MCNC: 85 MG/DL (ref 70–110)
HCO3 UR-SCNC: 30 MMOL/L (ref 24–28)
HCT VFR BLD AUTO: 35.9 % (ref 40–54)
HGB BLD-MCNC: 11.2 G/DL (ref 14–18)
IMM GRANULOCYTES # BLD AUTO: 0.05 K/UL (ref 0–0.04)
IMM GRANULOCYTES NFR BLD AUTO: 0.4 % (ref 0–0.5)
LYMPHOCYTES # BLD AUTO: 5 K/UL (ref 1–4.8)
LYMPHOCYTES NFR BLD: 43.8 % (ref 18–48)
MCH RBC QN AUTO: 27.5 PG (ref 27–31)
MCHC RBC AUTO-ENTMCNC: 31.2 G/DL (ref 32–36)
MCV RBC AUTO: 88 FL (ref 82–98)
MODE: ABNORMAL
MONOCYTES # BLD AUTO: 1.2 K/UL (ref 0.3–1)
MONOCYTES NFR BLD: 10.5 % (ref 4–15)
NEUTROPHILS # BLD AUTO: 4.9 K/UL (ref 1.8–7.7)
NEUTROPHILS NFR BLD: 43.4 % (ref 38–73)
NRBC BLD-RTO: 0 /100 WBC
PCO2 BLDA: 56.5 MMHG (ref 35–45)
PH SMN: 7.33 [PH] (ref 7.35–7.45)
PLATELET # BLD AUTO: 295 K/UL (ref 150–350)
PMV BLD AUTO: 10 FL (ref 9.2–12.9)
PO2 BLDA: 69 MMHG (ref 40–60)
POC BE: 4 MMOL/L
POC SATURATED O2: 92 % (ref 95–100)
POC TCO2: 32 MMOL/L (ref 24–29)
POTASSIUM SERPL-SCNC: 3.6 MMOL/L (ref 3.5–5.1)
PROT SERPL-MCNC: 6.8 G/DL (ref 6–8.4)
RBC # BLD AUTO: 4.07 M/UL (ref 4.6–6.2)
SAMPLE: ABNORMAL
SARS-COV-2 RDRP RESP QL NAA+PROBE: NEGATIVE
SITE: ABNORMAL
SODIUM SERPL-SCNC: 140 MMOL/L (ref 136–145)
SP02: 97
WBC # BLD AUTO: 11.41 K/UL (ref 3.9–12.7)

## 2020-05-23 PROCEDURE — 27000221 HC OXYGEN, UP TO 24 HOURS

## 2020-05-23 PROCEDURE — 99285 EMERGENCY DEPT VISIT HI MDM: CPT | Mod: 25

## 2020-05-23 PROCEDURE — 93005 ELECTROCARDIOGRAM TRACING: CPT

## 2020-05-23 PROCEDURE — 99900035 HC TECH TIME PER 15 MIN (STAT)

## 2020-05-23 PROCEDURE — 99284 EMERGENCY DEPT VISIT MOD MDM: CPT | Mod: ,,, | Performed by: EMERGENCY MEDICINE

## 2020-05-23 PROCEDURE — 25000242 PHARM REV CODE 250 ALT 637 W/ HCPCS: Performed by: STUDENT IN AN ORGANIZED HEALTH CARE EDUCATION/TRAINING PROGRAM

## 2020-05-23 PROCEDURE — U0002 COVID-19 LAB TEST NON-CDC: HCPCS

## 2020-05-23 PROCEDURE — 80053 COMPREHEN METABOLIC PANEL: CPT

## 2020-05-23 PROCEDURE — 93010 ELECTROCARDIOGRAM REPORT: CPT | Mod: ,,, | Performed by: INTERNAL MEDICINE

## 2020-05-23 PROCEDURE — 82803 BLOOD GASES ANY COMBINATION: CPT

## 2020-05-23 PROCEDURE — 85025 COMPLETE CBC W/AUTO DIFF WBC: CPT

## 2020-05-23 PROCEDURE — 94640 AIRWAY INHALATION TREATMENT: CPT

## 2020-05-23 PROCEDURE — 93010 EKG 12-LEAD: ICD-10-PCS | Mod: ,,, | Performed by: INTERNAL MEDICINE

## 2020-05-23 PROCEDURE — 94761 N-INVAS EAR/PLS OXIMETRY MLT: CPT

## 2020-05-23 PROCEDURE — 99284 PR EMERGENCY DEPT VISIT,LEVEL IV: ICD-10-PCS | Mod: ,,, | Performed by: EMERGENCY MEDICINE

## 2020-05-23 RX ORDER — IPRATROPIUM BROMIDE AND ALBUTEROL SULFATE 2.5; .5 MG/3ML; MG/3ML
3 SOLUTION RESPIRATORY (INHALATION)
Status: COMPLETED | OUTPATIENT
Start: 2020-05-23 | End: 2020-05-23

## 2020-05-23 RX ORDER — PREDNISONE 20 MG/1
40 TABLET ORAL DAILY
Qty: 10 TABLET | Refills: 0 | Status: SHIPPED | OUTPATIENT
Start: 2020-05-23 | End: 2020-05-28

## 2020-05-23 RX ADMIN — IPRATROPIUM BROMIDE AND ALBUTEROL SULFATE 3 ML: .5; 3 SOLUTION RESPIRATORY (INHALATION) at 12:05

## 2020-05-23 RX ADMIN — IPRATROPIUM BROMIDE AND ALBUTEROL SULFATE 3 ML: .5; 3 SOLUTION RESPIRATORY (INHALATION) at 11:05

## 2020-05-23 NOTE — DISCHARGE INSTRUCTIONS
Take steroids (prednisone) 40mg once a day for 5 days.  Continue to use your inhalers at home as prescribed.  Follow-up in clinic at [facility].

## 2020-05-23 NOTE — ED NOTES
Patient identifiers verified and correct for Baltazar Mccray  LOC: The patient is awake, alert and aware of environment with an appropriate affect, the patient is oriented x 3 and speaking appropriately.   APPEARANCE: Patient appears comfortable and in no acute distress, patient is clean and well groomed.  SKIN: The skin is warm and dry, color consistent with ethnicity, patient has normal skin turgor and moist mucus membranes, skin intact, no breakdown or bruising noted.   MUSCULOSKELETAL: Patient moving all extremities spontaneously, no swelling noted.  RESPIRATORY: Airway is open and patent, respirations are spontaneous, patient has a normal effort and rate, no accessory muscle use noted, pt placed on continuous pulse ox with O2 sats noted at 97% on 2L NC  CARDIAC: Pt placed on cardiac monitor. Patient has a normal rate and regular rhythm, no edema noted, capillary refill < 3 seconds.   GASTRO: Soft and non tender to palpation, no distention noted, normoactive bowel sounds present in all four quadrants. Pt states bowel movements have been regular.  : Pt denies any pain or frequency with urination.  NEURO: Pt opens eyes spontaneously, behavior appropriate to situation, follows commands, facial expression symmetrical, bilateral hand grasp equal and even, purposeful motor response noted, normal sensation in all extremities when touched with a finger.

## 2020-05-23 NOTE — ED PROVIDER NOTES
Encounter Date: 5/23/2020       History     Chief Complaint   Patient presents with    Shortness of Breath     SOB x30 minutes. Pt has history of COPD.      HPI   Baltazar Mccray is a 60 yo M with PMH of COPD and HF (last EF 50%) who presents to [facility] ED for difficulty breathing. He states he was sitting at the bus stop on his way to work and he suddenly couldn't breath. He tried using his duoneb and fluticasone inhaler x 2 with no relief. En route, EMS gave 1 duoneb treatment and 125 mg solumedrol. He had some improvement with treatment. He endorses smoking 3 days ago. He denies any recent medication changes and states he is compliant, he is on a beta blocker. Of note, he has had multiple recent admissions for difficulty breathing. He denies any fevers, runny nose, sore throat, cough, chest pain, abd pain, bowel/urinary changes, n/v/d, leg swelling.     Review of patient's allergies indicates:   Allergen Reactions    Benazepril Swelling    Duoneb [ipratropium-albuterol]      Report Tremors     Past Medical History:   Diagnosis Date    Asthma     COPD (chronic obstructive pulmonary disease)     home O2 at night only    Coronary artery disease     Hypertension     Pneumonia     Seizures      Past Surgical History:   Procedure Laterality Date    LEFT HEART CATHETERIZATION  4/23/2020    Procedure: Left heart cath;  Surgeon: Nic Pollack MD;  Location: [facility] CATH LAB;  Service: Cardiology;;     Family History   Problem Relation Age of Onset    Cancer Father     Hypertension Sister     Stroke Sister     Stroke Brother     Diabetes Neg Hx     Heart disease Neg Hx      Social History     Tobacco Use    Smoking status: Current Every Day Smoker     Packs/day: 0.25     Types: Cigarettes    Smokeless tobacco: Never Used    Tobacco comment: 1-2 cigs per day   Substance Use Topics    Alcohol use: Yes     Alcohol/week: 2.0 standard drinks     Types: 2 Cans of beer per week     Comment: every night    Drug  use: No     Review of Systems  Constitutional: Negative for chills and fever.   HENT: Negative for congestion, postnasal drip, rhinorrhea, sinus pain and sore throat.    Respiratory: Positive for shortness of breath. Negative for cough, choking, wheezing and stridor.    Cardiovascular: Negative for chest pain, palpitations and leg swelling.   Gastrointestinal: Negative for abdominal pain, blood in stool, diarrhea, nausea and vomiting.   Endocrine: Negative for polyuria.   Genitourinary: Negative for difficulty urinating and hematuria.   Musculoskeletal: Negative for back pain and neck pain.   Skin: Negative for color change and rash.   Allergic/Immunologic: Negative for immunocompromised state.   Neurological: Negative for dizziness, syncope and headaches.     Physical Exam     Initial Vitals [05/23/20 1030]   BP Pulse Resp Temp SpO2   105/69 89 20 98.7 °F (37.1 °C) 95 %      MAP       --         Physical Exam  Constitutional: He appears well-developed. No distress.   AA male sitting in bed. Speaking in full sentences, no use of accessory muscles. Walked to bed from [facility].    HENT:   Head: Normocephalic and atraumatic.   Mouth/Throat: Oropharynx is clear and moist. No oropharyngeal exudate.   He has poor dentition    Eyes: Pupils are equal, round, and reactive to light.   Neck: Neck supple.   Cardiovascular: Normal rate, normal heart sounds and intact distal pulses.   No murmur heard.  Pulmonary/Chest: No accessory muscle usage or stridor. No tachypnea. No respiratory distress. He has decreased breath sounds. He has wheezes in the right upper field and the left upper field. He has no rhonchi. He has no rales. He exhibits no tenderness, no deformity and no swelling.   On auscultation he has poor tidal volume   Abdominal: Soft. Bowel sounds are normal.   Musculoskeletal:        Right lower leg: Normal. He exhibits no edema.        Left lower leg: Normal. He exhibits no edema.   Neurological: He is alert and  oriented to person, place, and time.   Skin: Skin is warm and dry. Capillary refill takes less than 2 seconds.      ED Course   Procedures  Labs Reviewed   CBC W/ AUTO DIFFERENTIAL - Abnormal; Notable for the following components:       Result Value    RBC 4.07 (*)     Hemoglobin 11.2 (*)     Hematocrit 35.9 (*)     Mean Corpuscular Hemoglobin Conc 31.2 (*)     RDW 16.9 (*)     Immature Grans (Abs) 0.05 (*)     Lymph # 5.0 (*)     Mono # 1.2 (*)     All other components within normal limits   COMPREHENSIVE METABOLIC PANEL - Abnormal; Notable for the following components:    Calcium 8.1 (*)     AST 48 (*)     ALT 53 (*)     All other components within normal limits   ISTAT PROCEDURE - Abnormal; Notable for the following components:    POC PH 7.334 (*)     POC PCO2 56.5 (*)     POC PO2 69 (*)     POC HCO3 30.0 (*)     POC SATURATED O2 92 (*)     POC TCO2 32 (*)     All other components within normal limits   SARS-COV-2 RNA AMPLIFICATION, QUAL     EKG Readings: (Independently Interpreted)   Initial Reading: No STEMI. Rhythm: Normal Sinus Rhythm. Ectopy: No Ectopy.       Imaging Results          X-Ray Chest AP Portable (Final result)  Result time 05/23/20 11:33:52    Final result by Alec Carmichael MD (05/23/20 11:33:52)                 Impression:      Emphysematous changes within the upper lobes.    No acute cardiopulmonary process.    Electronically signed by resident: Manuel Corbin  Date:    05/23/2020  Time:    11:22    Electronically signed by: Alec Carmichael MD  Date:    05/23/2020  Time:    11:33             Narrative:    EXAMINATION:  XR CHEST AP PORTABLE    CLINICAL HISTORY:  Shortness of breath    TECHNIQUE:  Single frontal view of the chest was performed.    COMPARISON:  Chest radiograph 05/12/2020, 05/06/2020, 04/19/2020    FINDINGS:  Trachea and mediastinal structures are midline.  Cardiac silhouette and pulmonary vascularity appears within normal limits.    Lungs are symmetrically expanded.   There are emphysematous changes within the upper lobes.  No evidence of focal consolidation, mass, pleural effusion, or pneumothorax.  Bandlike opacity in the right midlung present prior exams, likely representing of scarring.    No acute osseous abnormality.                               X-Rays:   Independently Interpreted Readings:   Chest X-Ray: Clear bilaterally, consistent with COPD/emphysema. No focal consolidations, pleural effusion, or pneumonthorax.     Medical Decision Making:   History:   Old Medical Records: I decided to obtain old medical records.  Old Records Summarized: records from previous admission(s).  Initial Assessment:   60 yo M with hx of HTN, asthma/COPD, CAD who presents to the ED with a complaint of worsening shortness of breath x 1 day  Differential Diagnosis:   DDx includes but is not limited to COPD/asthma exacerbation, pneumonia, covid, ACS, acute on chronic heart failure  Clinical Tests:   Lab Tests: Ordered  Radiological Study: Ordered  Medical Tests: Ordered  ED Management:  S/p duoneb and solumedrol by EMS.    On arrival, patient satting 86% on RA. Placed on 2L NC with improvement to >95%. Patient voices distress, but speaking in full sentences. Slightly tachypneic RR 20. Does not appear increased WOB; no use of accessory muscles. VBG consistent with chronic hypercarbia 2/2 COPD.     EKG without ST changes. CXR consistent with COPD/emphysema; no focal consolidation, pulmonary edema, pleural effusion, or pneumothorax noted. COVID negative.    Patient appears comfortable and not in any respiratory distress when observed from afar, however exaggerates breathing difficulty whenever anyone walks into his room. Total of 2 x duonebs given. Patient reports subjective improvement. Satting 93% on RA with ambulation. Patient stable for discharge home with prednisone 40mg x 5 days. Less concern for pneumonia and does not need antibiotics at this time. Advised to follow-up in clinic at [facility]  [facility].                Attending Attestation:   Physician Attestation Statement for Resident:  As the supervising MD   Physician Attestation Statement: I have personally seen and examined this patient.   I agree with the above history. -:   As the supervising MD I agree with the above PE.    As the supervising MD I agree with the above treatment, course, plan, and disposition.   -: Arrived complaining of shortness of breath.  Quickly improved 1 DuoNeb in the ED. However, he exaggerated his breathing when he knew he was being observed, but was breathing quite comfortably when he did not realize he was being observed.  After 2nd treatment, his observed verses unobserved behavior converged and he felt much better, reporting the same.  Okay for discharge.                    ED Course as of May 23 1415   Sat May 23, 2020   1123 X-Ray Chest AP Portable [YL]   1146 SARS-CoV-2 RNA, Amplification, Qual: Negative [DC]      ED Course User Index  [DC] Jose Juan Wilson MD  [YL] Brad Ames MD                Clinical Impression:       ICD-10-CM ICD-9-CM   1. COPD exacerbation J44.1 491.21   2. SOB (shortness of breath) R06.02 786.05             ED Disposition Condition    Discharge Stable        ED Prescriptions     Medication Sig Dispense Start Date End Date Auth. Provider    predniSONE (DELTASONE) 20 MG tablet Take 2 tablets (40 mg total) by mouth once daily. for 5 days 10 tablet 5/23/2020 5/28/2020 Brad Ames MD        Follow-up Information    None                                    Brad Ames MD  Resident  05/23/20 1321       Jose Juan Wilson MD  05/23/20 1416

## 2020-05-23 NOTE — MEDICAL/APP STUDENT
History     Chief Complaint   Patient presents with    Shortness of Breath     SOB x30 minutes. Pt has history of COPD.      Baltazar Mccray is a 58 yo M with PMH of COPD and HF (last EF 50%) who [place] [facility] [place]. He states he was sitting at the bus stop on his way to work and he suddenly couldn't breath. He tried using his duoneb and fluticasone inhaler x 2 with no relief. En route, EMS gave 1 duoneb treatment and 125 mg solumedrol. He had some improvement with treatment. He endorses smoking 3 days ago. He denies any recent medication changes and states he is compliant, he is on a beta blocker. Of note, he has had multiple recent admissions for difficulty breathing. He denies any fevers, runny nose, sore throat, cough, chest pain, abd pain, bowel/urinary changes, n/v/d, leg swelling.           Past Medical History:   Diagnosis Date    Asthma     COPD (chronic obstructive pulmonary disease)     home O2 at night only    Coronary artery disease     Hypertension     Pneumonia     Seizures        Past Surgical History:   Procedure Laterality Date    LEFT HEART CATHETERIZATION  4/23/2020    Procedure: Left heart cath;  Surgeon: Nic Pollack MD;  Location: [facility] CATH LAB;  Service: Cardiology;;       Family History   Problem Relation Age of Onset    Cancer Father     Hypertension Sister     Stroke Sister     Stroke Brother     Diabetes Neg Hx     Heart disease Neg Hx        Social History     Tobacco Use    Smoking status: Current Every Day Smoker     Packs/day: 0.25     Types: Cigarettes    Smokeless tobacco: Never Used    Tobacco comment: 1-2 cigs per day   Substance Use Topics    Alcohol use: Yes     Alcohol/week: 2.0 standard drinks     Types: 2 Cans of beer per week     Comment: every night    Drug use: No       Review of Systems   Constitutional: Negative for chills and fever.   HENT: Negative for congestion, postnasal drip, rhinorrhea, sinus pain and sore throat.   "  Respiratory: Positive for shortness of breath. Negative for cough, choking, wheezing and stridor.    Cardiovascular: Negative for chest pain, palpitations and leg swelling.   Gastrointestinal: Negative for abdominal pain, blood in stool, diarrhea, nausea and vomiting.   Endocrine: Negative for polyuria.   Genitourinary: Negative for difficulty urinating and hematuria.   Musculoskeletal: Negative for back pain and neck pain.   Skin: Negative for color change and rash.   Allergic/Immunologic: Negative for immunocompromised state.   Neurological: Negative for dizziness, syncope and headaches.       Physical Exam   /69   Pulse 89   Temp 98.7 °F (37.1 °C) (Oral)   Resp 20   Ht 5' 8" (1.727 m)   Wt 54.1 kg (119 lb 4.3 oz)   SpO2 95%   BMI 18.13 kg/m²     Physical Exam    Constitutional: He appears well-developed. No distress.   Black male sitting in bed. Speaking in full sentences, no use of accessory muscles. [place]    HENT:   Head: Normocephalic and atraumatic.   Mouth/Throat: Oropharynx is clear and moist. No oropharyngeal exudate.   He has poor dentition    Eyes: Pupils are equal, round, and reactive to light.   Neck: Neck supple.   Cardiovascular: Normal rate, normal heart sounds and intact distal pulses.   No murmur heard.  Pulmonary/Chest: No accessory muscle usage or stridor. No tachypnea. No respiratory distress. He has decreased breath sounds. He has wheezes in the right upper field and the left upper field. He has no rhonchi. He has no rales. He exhibits no tenderness, no deformity and no swelling.   On auscultation he has poor tidal volume   Abdominal: Soft. Bowel sounds are normal.   Musculoskeletal:        Right lower leg: Normal. He exhibits no edema.        Left lower leg: Normal. He exhibits no edema.   Neurological: He is alert and oriented to person, place, and time.   Skin: Skin is warm and dry. Capillary refill takes less than 2 seconds.         ED Course "     Medical Decision Making:   History:   History taken verbally  Old Records Summarized: other records.  Initial Assessment:   Baltazar Mccray is a 58 yo M with PMH of COPD and HF (last EF 50%) who presents to [facility] ED for difficulty breathing. Some improvement with EMS duoneb and solumedrol. Vitals are WNL. He is clinically stable. Patient is speaking in full sentences.   Differential Diagnosis:   DDx includes but is not limited to COPD exacerbation, CHF exacerbation, pneumonia, bronchitis  Clinical Tests:   Will order CBC, CMP, VBG, CXR, EKG  ED Management:  -Duoneb tx given with some improvement.  -He is breathing comfortably when unobserved but has SOB when provider in room    Will continue to monitor and reassess.    Manuel Barcenas, MS4  11:31 AM  05/23/2020

## 2020-05-30 ENCOUNTER — HOSPITAL ENCOUNTER (EMERGENCY)
Facility: HOSPITAL | Age: 60
Discharge: HOME | End: 2020-05-30
Attending: EMERGENCY MEDICINE
Payer: MEDICAID

## 2020-05-30 VITALS
HEART RATE: 65 BPM | SYSTOLIC BLOOD PRESSURE: 118 MMHG | TEMPERATURE: 98 F | OXYGEN SATURATION: 100 % | RESPIRATION RATE: 18 BRPM | DIASTOLIC BLOOD PRESSURE: 72 MMHG

## 2020-05-30 DIAGNOSIS — J44.1 COPD EXACERBATION: Primary | ICD-10-CM

## 2020-05-30 DIAGNOSIS — R06.02 SOB (SHORTNESS OF BREATH): ICD-10-CM

## 2020-05-30 PROCEDURE — 93010 ELECTROCARDIOGRAM REPORT: CPT | Mod: ,,, | Performed by: INTERNAL MEDICINE

## 2020-05-30 PROCEDURE — 94761 N-INVAS EAR/PLS OXIMETRY MLT: CPT

## 2020-05-30 PROCEDURE — 25000242 PHARM REV CODE 250 ALT 637 W/ HCPCS: Performed by: EMERGENCY MEDICINE

## 2020-05-30 PROCEDURE — 99285 EMERGENCY DEPT VISIT HI MDM: CPT | Mod: 25

## 2020-05-30 PROCEDURE — 93010 EKG 12-LEAD: ICD-10-PCS | Mod: ,,, | Performed by: INTERNAL MEDICINE

## 2020-05-30 PROCEDURE — 93005 ELECTROCARDIOGRAM TRACING: CPT

## 2020-05-30 PROCEDURE — 99285 EMERGENCY DEPT VISIT HI MDM: CPT | Mod: ,,, | Performed by: EMERGENCY MEDICINE

## 2020-05-30 PROCEDURE — 94640 AIRWAY INHALATION TREATMENT: CPT

## 2020-05-30 PROCEDURE — 27000221 HC OXYGEN, UP TO 24 HOURS

## 2020-05-30 PROCEDURE — 99285 PR EMERGENCY DEPT VISIT,LEVEL V: ICD-10-PCS | Mod: ,,, | Performed by: EMERGENCY MEDICINE

## 2020-05-30 RX ORDER — ALBUTEROL SULFATE 90 UG/1
2 AEROSOL, METERED RESPIRATORY (INHALATION) EVERY 6 HOURS PRN
Qty: 8.5 G | Refills: 2 | Status: ON HOLD | OUTPATIENT
Start: 2020-05-30 | End: 2020-06-06 | Stop reason: SDUPTHER

## 2020-05-30 RX ORDER — IPRATROPIUM BROMIDE AND ALBUTEROL SULFATE 2.5; .5 MG/3ML; MG/3ML
3 SOLUTION RESPIRATORY (INHALATION)
Status: COMPLETED | OUTPATIENT
Start: 2020-05-30 | End: 2020-05-30

## 2020-05-30 RX ADMIN — IPRATROPIUM BROMIDE AND ALBUTEROL SULFATE 3 ML: .5; 3 SOLUTION RESPIRATORY (INHALATION) at 01:05

## 2020-05-30 NOTE — ED TRIAGE NOTES
60 year old male presents to ED via EMS reporting SOB. Hx of COPD. Per EMS pt was sating 89 on RA and is noncompliant with home o2 2L. Pt arrived on non rebreather sating 100%. Pt placed on 2 L nasal cannula. Pt reports feeling better. Pt axo4. +ETOH

## 2020-05-30 NOTE — ED PROVIDER NOTES
Encounter Date: 5/30/2020       History     Chief Complaint   Patient presents with    Shortness of Breath     Pt arrives c/o SOB. 89 % RA. 98% on 10L. Hx of COPD. Won't keep oxygen on.     Mei Mccray is a 60-year-old male history of COPD, on home O2 at 2 L, hypertension here today with shortness of breath.  Patient states he tried his home albuterol treatment without improvement.  He was on oxygen when he started feeling short of breath.  He denies fever, chills, cough, chest pain.  No recent illnesses.  Per EMS, patient was satting 89% on room air.  He is placed on 10 L nasal cannula for transfer.          Review of patient's allergies indicates:   Allergen Reactions    Benazepril Swelling    Duoneb [ipratropium-albuterol]      Report Tremors     Past Medical History:   Diagnosis Date    Asthma     COPD (chronic obstructive pulmonary disease)     home O2 at night only    Coronary artery disease     Hypertension     Pneumonia     Seizures      Past Surgical History:   Procedure Laterality Date    LEFT HEART CATHETERIZATION  4/23/2020    Procedure: Left heart cath;  Surgeon: Nic Pollack MD;  Location: [facility] CATH LAB;  Service: Cardiology;;     Family History   Problem Relation Age of Onset    Cancer Father     Hypertension Sister     Stroke Sister     Stroke Brother     Diabetes Neg Hx     Heart disease Neg Hx      Social History     Tobacco Use    Smoking status: Current Every Day Smoker     Packs/day: 0.25     Types: Cigarettes    Smokeless tobacco: Never Used    Tobacco comment: 1-2 cigs per day   Substance Use Topics    Alcohol use: Yes     Alcohol/week: 2.0 standard drinks     Types: 2 Cans of beer per week     Comment: every night    Drug use: No     Review of Systems   Constitutional: Negative for chills and fever.   HENT: Negative for rhinorrhea and sore throat.    Respiratory: Positive for shortness of breath and wheezing.    Cardiovascular: Negative for chest pain and  palpitations.   Gastrointestinal: Negative for abdominal pain, nausea and vomiting.   Genitourinary: Negative for dysuria and hematuria.   Musculoskeletal: Negative for myalgias.   Skin: Negative for pallor.   Neurological: Negative for weakness and light-headedness.       Physical Exam     Initial Vitals [05/30/20 0028]   BP Pulse Resp Temp SpO2   118/72 80 18 98.3 °F (36.8 °C) 98 %      MAP       --         Physical Exam    Nursing note and vitals reviewed.  Constitutional: He appears well-developed. He does not appear ill.   HENT:   Mouth/Throat: Oropharynx is clear and moist.   Neck: Normal range of motion. Neck supple. No JVD present.   Cardiovascular: Regular rhythm, normal heart sounds and intact distal pulses.   Pulmonary/Chest: Effort normal. No accessory muscle usage. No tachypnea. No respiratory distress. He has decreased breath sounds. He has wheezes (Faint). He exhibits no tenderness.   Abdominal: Soft. There is no tenderness.   Lymphadenopathy:     He has no cervical adenopathy.   Neurological: He is alert and oriented to person, place, and time.   Skin: Skin is warm. Capillary refill takes less than 2 seconds. No pallor.         ED Course   Procedures  Labs Reviewed - No data to display  EKG Readings: (Independently Interpreted)   EKG:EKG sinus rhythm at 79 with right atrial enlargement, no ST elevations or depressions.         Imaging Results    None          Medical Decision Making:   History:   Old Medical Records: I decided to obtain old medical records.  Old Records Summarized: records from clinic visits.  Initial Assessment:   60 M hx of COPD here with acute exacerbation.  O2 sat 94% on 2 L nasal cannula which is his home O2.  Patient is well-appearing in no acute respiratory distress.  He does have decreased breath sounds bilaterally with a faint wheeze.      Nebulizer ordered.    EKG without ischemia.  Differential Diagnosis:   Acute COPD exacerbation, noncompliance with home oxygen, no  suspicion for pneumonia  Clinical Tests:   Radiological Study: Reviewed and Ordered  Medical Tests: Reviewed and Ordered  ED Management:  Patient reports significant improvement after treatment.  O2 sat on 2 L is 100%.  Will titrate down.    Recommend follow-up with PCP for further treatment evaluation.                                 Clinical Impression:       ICD-10-CM ICD-9-CM   1. COPD exacerbation J44.1 491.21   2. SOB (shortness of breath) R06.02 786.05                                Trena Aguirre MD  05/30/20 0208

## 2020-06-04 ENCOUNTER — HOSPITAL ENCOUNTER (OUTPATIENT)
Facility: HOSPITAL | Age: 60
Discharge: HOME | End: 2020-06-06
Attending: EMERGENCY MEDICINE | Admitting: INTERNAL MEDICINE
Payer: MEDICAID

## 2020-06-04 DIAGNOSIS — J96.21 ACUTE ON CHRONIC RESPIRATORY FAILURE WITH HYPOXIA: ICD-10-CM

## 2020-06-04 DIAGNOSIS — R53.81 DEBILITY: ICD-10-CM

## 2020-06-04 DIAGNOSIS — R09.02 HYPOXIA: Primary | ICD-10-CM

## 2020-06-04 DIAGNOSIS — R06.02 SOB (SHORTNESS OF BREATH): ICD-10-CM

## 2020-06-04 DIAGNOSIS — J44.1 COPD EXACERBATION: ICD-10-CM

## 2020-06-04 LAB
ALBUMIN SERPL BCP-MCNC: 3.5 G/DL (ref 3.5–5.2)
ALP SERPL-CCNC: 105 U/L (ref 55–135)
ALT SERPL W/O P-5'-P-CCNC: 26 U/L (ref 10–44)
ANION GAP SERPL CALC-SCNC: 11 MMOL/L (ref 8–16)
AST SERPL-CCNC: 38 U/L (ref 10–40)
BASOPHILS # BLD AUTO: 0.06 K/UL (ref 0–0.2)
BASOPHILS NFR BLD: 0.8 % (ref 0–1.9)
BILIRUB SERPL-MCNC: 0.7 MG/DL (ref 0.1–1)
BNP SERPL-MCNC: 129 PG/ML (ref 0–99)
BUN SERPL-MCNC: 3 MG/DL (ref 6–20)
CALCIUM SERPL-MCNC: 9.2 MG/DL (ref 8.7–10.5)
CHLORIDE SERPL-SCNC: 103 MMOL/L (ref 95–110)
CK SERPL-CCNC: 128 U/L (ref 20–200)
CO2 SERPL-SCNC: 24 MMOL/L (ref 23–29)
CREAT SERPL-MCNC: 0.8 MG/DL (ref 0.5–1.4)
CRP SERPL-MCNC: 55.5 MG/L (ref 0–8.2)
DIFFERENTIAL METHOD: ABNORMAL
EOSINOPHIL # BLD AUTO: 0.2 K/UL (ref 0–0.5)
EOSINOPHIL NFR BLD: 2.7 % (ref 0–8)
ERYTHROCYTE [DISTWIDTH] IN BLOOD BY AUTOMATED COUNT: 17.1 % (ref 11.5–14.5)
EST. GFR  (AFRICAN AMERICAN): >60 ML/MIN/1.73 M^2
EST. GFR  (NON AFRICAN AMERICAN): >60 ML/MIN/1.73 M^2
FERRITIN SERPL-MCNC: 1016 NG/ML (ref 20–300)
GLUCOSE SERPL-MCNC: 59 MG/DL (ref 70–110)
HCT VFR BLD AUTO: 36.1 % (ref 40–54)
HGB BLD-MCNC: 11.6 G/DL (ref 14–18)
IMM GRANULOCYTES # BLD AUTO: 0.03 K/UL (ref 0–0.04)
IMM GRANULOCYTES NFR BLD AUTO: 0.4 % (ref 0–0.5)
LACTATE SERPL-SCNC: 1.9 MMOL/L (ref 0.5–2.2)
LDH SERPL L TO P-CCNC: 221 U/L (ref 110–260)
LYMPHOCYTES # BLD AUTO: 1.7 K/UL (ref 1–4.8)
LYMPHOCYTES NFR BLD: 21.4 % (ref 18–48)
MCH RBC QN AUTO: 28.3 PG (ref 27–31)
MCHC RBC AUTO-ENTMCNC: 32.1 G/DL (ref 32–36)
MCV RBC AUTO: 88 FL (ref 82–98)
MONOCYTES # BLD AUTO: 0.9 K/UL (ref 0.3–1)
MONOCYTES NFR BLD: 11.9 % (ref 4–15)
NEUTROPHILS # BLD AUTO: 4.9 K/UL (ref 1.8–7.7)
NEUTROPHILS NFR BLD: 62.8 % (ref 38–73)
NRBC BLD-RTO: 0 /100 WBC
PLATELET # BLD AUTO: 217 K/UL (ref 150–350)
PMV BLD AUTO: 10.5 FL (ref 9.2–12.9)
POTASSIUM SERPL-SCNC: 3.9 MMOL/L (ref 3.5–5.1)
PROCALCITONIN SERPL IA-MCNC: 0.09 NG/ML
PROT SERPL-MCNC: 7.3 G/DL (ref 6–8.4)
RBC # BLD AUTO: 4.1 M/UL (ref 4.6–6.2)
SARS-COV-2 RDRP RESP QL NAA+PROBE: NEGATIVE
SODIUM SERPL-SCNC: 138 MMOL/L (ref 136–145)
TROPONIN I SERPL DL<=0.01 NG/ML-MCNC: 0.03 NG/ML (ref 0–0.03)
TROPONIN I SERPL DL<=0.01 NG/ML-MCNC: 0.06 NG/ML (ref 0–0.03)
WBC # BLD AUTO: 7.82 K/UL (ref 3.9–12.7)

## 2020-06-04 PROCEDURE — 85025 COMPLETE CBC W/AUTO DIFF WBC: CPT

## 2020-06-04 PROCEDURE — 99284 EMERGENCY DEPT VISIT MOD MDM: CPT | Mod: ,,, | Performed by: PHYSICIAN ASSISTANT

## 2020-06-04 PROCEDURE — 83615 LACTATE (LD) (LDH) ENZYME: CPT

## 2020-06-04 PROCEDURE — 86140 C-REACTIVE PROTEIN: CPT

## 2020-06-04 PROCEDURE — 96372 THER/PROPH/DIAG INJ SC/IM: CPT | Mod: 59

## 2020-06-04 PROCEDURE — 25000242 PHARM REV CODE 250 ALT 637 W/ HCPCS: Performed by: HOSPITALIST

## 2020-06-04 PROCEDURE — 83605 ASSAY OF LACTIC ACID: CPT

## 2020-06-04 PROCEDURE — 84145 PROCALCITONIN (PCT): CPT

## 2020-06-04 PROCEDURE — 80053 COMPREHEN METABOLIC PANEL: CPT

## 2020-06-04 PROCEDURE — 99220 PR INITIAL OBSERVATION CARE,LEVL III: ICD-10-PCS | Mod: ,,, | Performed by: HOSPITALIST

## 2020-06-04 PROCEDURE — 82728 ASSAY OF FERRITIN: CPT

## 2020-06-04 PROCEDURE — 93010 ELECTROCARDIOGRAM REPORT: CPT | Mod: ,,, | Performed by: INTERNAL MEDICINE

## 2020-06-04 PROCEDURE — 94761 N-INVAS EAR/PLS OXIMETRY MLT: CPT

## 2020-06-04 PROCEDURE — 93005 ELECTROCARDIOGRAM TRACING: CPT

## 2020-06-04 PROCEDURE — 93010 EKG 12-LEAD: ICD-10-PCS | Mod: ,,, | Performed by: INTERNAL MEDICINE

## 2020-06-04 PROCEDURE — 27000221 HC OXYGEN, UP TO 24 HOURS

## 2020-06-04 PROCEDURE — 25000003 PHARM REV CODE 250: Performed by: EMERGENCY MEDICINE

## 2020-06-04 PROCEDURE — 99285 EMERGENCY DEPT VISIT HI MDM: CPT | Mod: 25

## 2020-06-04 PROCEDURE — 96374 THER/PROPH/DIAG INJ IV PUSH: CPT

## 2020-06-04 PROCEDURE — 99284 PR EMERGENCY DEPT VISIT,LEVEL IV: ICD-10-PCS | Mod: ,,, | Performed by: PHYSICIAN ASSISTANT

## 2020-06-04 PROCEDURE — 99220 PR INITIAL OBSERVATION CARE,LEVL III: CPT | Mod: ,,, | Performed by: HOSPITALIST

## 2020-06-04 PROCEDURE — 83880 ASSAY OF NATRIURETIC PEPTIDE: CPT

## 2020-06-04 PROCEDURE — 82550 ASSAY OF CK (CPK): CPT

## 2020-06-04 PROCEDURE — G0378 HOSPITAL OBSERVATION PER HR: HCPCS

## 2020-06-04 PROCEDURE — 84484 ASSAY OF TROPONIN QUANT: CPT

## 2020-06-04 PROCEDURE — 63600175 PHARM REV CODE 636 W HCPCS: Performed by: HOSPITALIST

## 2020-06-04 PROCEDURE — 96361 HYDRATE IV INFUSION ADD-ON: CPT

## 2020-06-04 PROCEDURE — 25000003 PHARM REV CODE 250: Performed by: HOSPITALIST

## 2020-06-04 PROCEDURE — U0002 COVID-19 LAB TEST NON-CDC: HCPCS

## 2020-06-04 PROCEDURE — 94640 AIRWAY INHALATION TREATMENT: CPT

## 2020-06-04 RX ORDER — ONDANSETRON 2 MG/ML
4 INJECTION INTRAMUSCULAR; INTRAVENOUS EVERY 8 HOURS PRN
Status: DISCONTINUED | OUTPATIENT
Start: 2020-06-04 | End: 2020-06-06 | Stop reason: HOSPADM

## 2020-06-04 RX ORDER — ATORVASTATIN CALCIUM 40 MG/1
40 TABLET, FILM COATED ORAL DAILY
Status: DISCONTINUED | OUTPATIENT
Start: 2020-06-05 | End: 2020-06-06 | Stop reason: HOSPADM

## 2020-06-04 RX ORDER — TALC
6 POWDER (GRAM) TOPICAL NIGHTLY PRN
Status: DISCONTINUED | OUTPATIENT
Start: 2020-06-04 | End: 2020-06-06 | Stop reason: HOSPADM

## 2020-06-04 RX ORDER — ISOSORBIDE DINITRATE AND HYDRALAZINE HYDROCHLORIDE 37.5; 2 MG/1; MG/1
1 TABLET ORAL 2 TIMES DAILY
Status: DISCONTINUED | OUTPATIENT
Start: 2020-06-04 | End: 2020-06-06 | Stop reason: HOSPADM

## 2020-06-04 RX ORDER — FUROSEMIDE 10 MG/ML
40 INJECTION INTRAMUSCULAR; INTRAVENOUS ONCE
Status: COMPLETED | OUTPATIENT
Start: 2020-06-04 | End: 2020-06-04

## 2020-06-04 RX ORDER — PREDNISONE 20 MG/1
40 TABLET ORAL DAILY
Status: DISCONTINUED | OUTPATIENT
Start: 2020-06-04 | End: 2020-06-05

## 2020-06-04 RX ORDER — IBUPROFEN 200 MG
24 TABLET ORAL
Status: DISCONTINUED | OUTPATIENT
Start: 2020-06-04 | End: 2020-06-05

## 2020-06-04 RX ORDER — SODIUM CHLORIDE 0.9 % (FLUSH) 0.9 %
10 SYRINGE (ML) INJECTION
Status: DISCONTINUED | OUTPATIENT
Start: 2020-06-04 | End: 2020-06-06 | Stop reason: HOSPADM

## 2020-06-04 RX ORDER — ACETAMINOPHEN 325 MG/1
650 TABLET ORAL EVERY 4 HOURS PRN
Status: DISCONTINUED | OUTPATIENT
Start: 2020-06-04 | End: 2020-06-06 | Stop reason: HOSPADM

## 2020-06-04 RX ORDER — PANTOPRAZOLE SODIUM 40 MG/1
40 TABLET, DELAYED RELEASE ORAL DAILY
Status: DISCONTINUED | OUTPATIENT
Start: 2020-06-05 | End: 2020-06-06 | Stop reason: HOSPADM

## 2020-06-04 RX ORDER — SENNOSIDES 8.6 MG/1
1 TABLET ORAL DAILY PRN
Status: DISCONTINUED | OUTPATIENT
Start: 2020-06-04 | End: 2020-06-06 | Stop reason: HOSPADM

## 2020-06-04 RX ORDER — ENOXAPARIN SODIUM 100 MG/ML
40 INJECTION SUBCUTANEOUS EVERY 24 HOURS
Status: DISCONTINUED | OUTPATIENT
Start: 2020-06-04 | End: 2020-06-06 | Stop reason: HOSPADM

## 2020-06-04 RX ORDER — IBUPROFEN 200 MG
16 TABLET ORAL
Status: DISCONTINUED | OUTPATIENT
Start: 2020-06-04 | End: 2020-06-05

## 2020-06-04 RX ORDER — METOPROLOL TARTRATE 25 MG/1
25 TABLET, FILM COATED ORAL 2 TIMES DAILY
Status: DISCONTINUED | OUTPATIENT
Start: 2020-06-04 | End: 2020-06-06 | Stop reason: HOSPADM

## 2020-06-04 RX ORDER — TIOTROPIUM BROMIDE 18 UG/1
18 CAPSULE ORAL; RESPIRATORY (INHALATION) DAILY
Status: DISCONTINUED | OUTPATIENT
Start: 2020-06-05 | End: 2020-06-06 | Stop reason: HOSPADM

## 2020-06-04 RX ORDER — ASPIRIN 81 MG/1
81 TABLET ORAL DAILY
Status: DISCONTINUED | OUTPATIENT
Start: 2020-06-05 | End: 2020-06-06 | Stop reason: HOSPADM

## 2020-06-04 RX ORDER — PROCHLORPERAZINE EDISYLATE 5 MG/ML
5 INJECTION INTRAMUSCULAR; INTRAVENOUS EVERY 6 HOURS PRN
Status: DISCONTINUED | OUTPATIENT
Start: 2020-06-04 | End: 2020-06-06 | Stop reason: HOSPADM

## 2020-06-04 RX ORDER — IPRATROPIUM BROMIDE AND ALBUTEROL SULFATE 2.5; .5 MG/3ML; MG/3ML
3 SOLUTION RESPIRATORY (INHALATION) EVERY 6 HOURS PRN
Status: DISCONTINUED | OUTPATIENT
Start: 2020-06-04 | End: 2020-06-04

## 2020-06-04 RX ORDER — FLUTICASONE FUROATE AND VILANTEROL 100; 25 UG/1; UG/1
1 POWDER RESPIRATORY (INHALATION) DAILY
Status: DISCONTINUED | OUTPATIENT
Start: 2020-06-05 | End: 2020-06-05

## 2020-06-04 RX ORDER — IPRATROPIUM BROMIDE AND ALBUTEROL SULFATE 2.5; .5 MG/3ML; MG/3ML
3 SOLUTION RESPIRATORY (INHALATION)
Status: DISCONTINUED | OUTPATIENT
Start: 2020-06-04 | End: 2020-06-06 | Stop reason: HOSPADM

## 2020-06-04 RX ORDER — ISOSORBIDE DINITRATE AND HYDRALAZINE HYDROCHLORIDE 37.5; 2 MG/1; MG/1
1 TABLET ORAL 2 TIMES DAILY
Status: DISCONTINUED | OUTPATIENT
Start: 2020-06-04 | End: 2020-06-04

## 2020-06-04 RX ADMIN — Medication 6 MG: at 08:06

## 2020-06-04 RX ADMIN — DEXTROSE 250 ML: 10 SOLUTION INTRAVENOUS at 01:06

## 2020-06-04 RX ADMIN — PREDNISONE 40 MG: 20 TABLET ORAL at 08:06

## 2020-06-04 RX ADMIN — ENOXAPARIN SODIUM 40 MG: 100 INJECTION SUBCUTANEOUS at 09:06

## 2020-06-04 RX ADMIN — IPRATROPIUM BROMIDE AND ALBUTEROL SULFATE 3 ML: .5; 3 SOLUTION RESPIRATORY (INHALATION) at 08:06

## 2020-06-04 RX ADMIN — HYDRALAZINE HYDROCHLORIDE AND ISOSORBIDE DINITRATE 1 TABLET: 37.5; 2 TABLET, FILM COATED ORAL at 08:06

## 2020-06-04 RX ADMIN — METOPROLOL TARTRATE 25 MG: 25 TABLET, FILM COATED ORAL at 08:06

## 2020-06-04 RX ADMIN — FUROSEMIDE 40 MG: 10 INJECTION, SOLUTION INTRAMUSCULAR; INTRAVENOUS at 08:06

## 2020-06-04 NOTE — ED NOTES
Patient on continuous cardiac monitor, automatic blood pressure cuff and continuous pulse oximeter. VSS. AAOx3. Resting comfortably in stretcher. Updated patient on plan of care. Side rails up and bed in lowest position. Call light in reach. Instructed patient to call staff for assistance with mobility. Will continue to monitor.

## 2020-06-04 NOTE — ED PROVIDER NOTES
"Encounter Date: 6/4/2020  COVID Statement  The  of Health and Human Services and Naeem Delatorre, Governor of the [facility], have declared a State of Public Health Emergency due to the spread of a novel coronavirus and disease (COVID-19).  There is no currently accepted treatment except conservative measures and respiratory support if appropriate.  This has lead to significant resource capacity and potential delays in care.         History     Chief Complaint   Patient presents with    Shortness of Breath     SOB x1 day. Worsens upon exertion     12:34 PM  Patient is a 60-year-old male with a history of COPD on supplemental oxygen at home as needed, HTN, CAD, heart failure, seizure who presents to the ED for shortness of breath.  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  He return back to work today and was dyspneic again.  He states that he can only go 20 stabs before feeling out of breath.  He denies any fever, chills, diaphoresis, chest pain, cough.  His last cigarette was "several weeks ago".    He states that he uses 2L of O2 at home. He states that he has never received the portable Oxygen. He is out of his inhaler.       The history is provided by the patient, medical records and the EMS personnel. The history is limited by the condition of the patient.     Review of patient's allergies indicates:   Allergen Reactions    Benazepril Swelling    Duoneb [ipratropium-albuterol]      Report Tremors     Past Medical History:   Diagnosis Date    Asthma     COPD (chronic obstructive pulmonary disease)     home O2 at night only    Coronary artery disease     Hypertension     Pneumonia     Seizures      Past Surgical History:   Procedure Laterality Date    LEFT HEART CATHETERIZATION  4/23/2020    Procedure: Left heart cath;  Surgeon: Nic Pollack MD;  Location: " [facility] CATH LAB;  Service: Cardiology;;     Family History   Problem Relation Age of Onset    Cancer Father     Hypertension Sister     Stroke Sister     Stroke Brother     Diabetes Neg Hx     Heart disease Neg Hx      Social History     Tobacco Use    Smoking status: Current Every Day Smoker     Packs/day: 0.25     Types: Cigarettes    Smokeless tobacco: Never Used    Tobacco comment: 1-2 cigs per day   Substance Use Topics    Alcohol use: Yes     Alcohol/week: 2.0 standard drinks     Types: 2 Cans of beer per week     Comment: every night    Drug use: No     Review of Systems    Physical Exam     Initial Vitals [06/04/20 1157]   BP Pulse Resp Temp SpO2   (!) 164/104 84 20 98.3 °F (36.8 °C) 100 %      MAP       --         Physical Exam    Nursing note and vitals reviewed.  Constitutional: He appears well-developed and well-nourished. He is not diaphoretic.  Non-toxic appearance. He does not have a sickly appearance. He does not appear ill. No distress. Nasal cannula and face mask in place.   HENT:   Head: Normocephalic and atraumatic.   Nose: Nose normal.   Eyes: Conjunctivae and EOM are normal.   Neck: Normal range of motion.   Cardiovascular: Normal rate.   Pulmonary/Chest: Breath sounds normal. No accessory muscle usage. Tachypnea noted. No respiratory distress. He has no decreased breath sounds. He has no wheezes. He has no rales.   No hypoxia on RA at rest.  Patient desatted to 81% on RA with ambulation and became tachypneic and dyspneic.    Abdominal: He exhibits no distension.   Musculoskeletal: Normal range of motion.   Neurological: He is alert. He has normal strength.   Skin: Skin is warm and dry. No erythema.         ED Course   Procedures  Labs Reviewed   B-TYPE NATRIURETIC PEPTIDE - Abnormal; Notable for the following components:       Result Value     (*)     All other components within normal limits   CBC W/ AUTO DIFFERENTIAL - Abnormal; Notable for the following components:    RBC  4.10 (*)     Hemoglobin 11.6 (*)     Hematocrit 36.1 (*)     RDW 17.1 (*)     All other components within normal limits   COMPREHENSIVE METABOLIC PANEL - Abnormal; Notable for the following components:    Glucose 59 (*)     BUN, Bld 3 (*)     All other components within normal limits   TROPONIN I - Abnormal; Notable for the following components:    Troponin I 0.055 (*)     All other components within normal limits   C-REACTIVE PROTEIN - Abnormal; Notable for the following components:    CRP 55.5 (*)     All other components within normal limits   FERRITIN - Abnormal; Notable for the following components:    Ferritin 1,016 (*)     All other components within normal limits   TROPONIN I - Abnormal; Notable for the following components:    Troponin I 0.028 (*)     All other components within normal limits   SARS-COV-2 RNA AMPLIFICATION, QUAL   LACTATE DEHYDROGENASE   CK   LACTIC ACID, PLASMA   PROCALCITONIN     EKG Readings: (Independently Interpreted)   Initial Reading: No STEMI. Rhythm: Normal Sinus Rhythm. Heart Rate: 82.     ECG Results          EKG 12-lead (Final result)  Result time 06/04/20 14:06:02    Final result by Interface, Lab In [facility] (06/04/20 14:06:02)                 Narrative:    Test Reason : R06.02,    Vent. Rate : 082 BPM     Atrial Rate : 082 BPM     P-R Int : 168 ms          QRS Dur : 082 ms      QT Int : 392 ms       P-R-T Axes : 081 083 076 degrees     QTc Int : 457 ms    Normal sinus rhythm  Right atrial enlargement  Borderline Abnormal ECG  When compared with ECG of 30-MAY-2020 01:25,  No significant change was found  Confirmed by KAMILLE GUIDO MD ([id number]) on 6/4/2020 2:05:49 PM    Referred By: [email or web address]   SELF           Confirmed By:KAMILLE GUIDO MD                            Imaging Results          X-Ray Chest AP Portable (Final result)  Result time 06/04/20 12:48:19    Final result by Jeff Chairez Jr., MD (06/04/20 12:48:19)                 Impression:      See  above      Electronically signed by: Jeff Chairez MD  Date:    06/04/2020  Time:    12:48             Narrative:    EXAMINATION:  XR CHEST AP PORTABLE    CLINICAL HISTORY:  sob;    TECHNIQUE:  Single frontal view of the chest was performed.    COMPARISON:  May 23, 2020.    FINDINGS:  Monitoring leads are in place.  Heart size pulmonary vessels are similar.  The lungs slightly hyperexpanded.  No confluent consolidation or pneumothorax.  Faint nodular density overlying the left lower lung field not seen on the prior study may be related to a nipple shadow.                                 Medical Decision Making:   History:   Old Medical Records: I decided to obtain old medical records.  Old Records Summarized: records from clinic visits and records from previous admission(s).       <> Summary of Records: Patient recently treated with prednisone on 5/23/2020.   Initial Assessment:   Patient is a 60-year-old male with a history of COPD on supplemental oxygen at home as needed, HTN, CAD, heart failure, seizure who presents to the ED via EMS from work for shortness of breath.   Differential Diagnosis:   Includes but is not limited to COPD (progression of disease), unlikely exacerbation given no wheezing or cough, asthma, heart failure, ACS, acute respiratory failure, pneumonia, COVID-19 infection, other viral infection, malingering.  Independently Interpreted Test(s):   I have ordered and independently interpreted EKG Reading(s) - see prior notes  Clinical Tests:   Lab Tests: Reviewed and Ordered  Radiological Study: Ordered and Reviewed  Medical Tests: Ordered and Reviewed  ED Management:  Will initiate workup and continue monitor.  Patient is satting at 91% on room air.  Will place on oxygen for comfort.    EKG with NSR at 82 beats per minute.  No STEMI.  CBC with no leukocytosis.  H/H 11/36.  CMP without significant electrolyte abnormalities.  Glucose of 59.  Will feed and give D10.  Troponin mildly elevated at 0.055.   Will trend.  CRP elevated to 55.  Ferritin elevated at 1016.  Lactic acid within normal limits at 1.9.  CK, procal, LDH within normal limits.  Chest x-ray with heart size and pulmonary vessels similar to previous exams.  Slung lungs are slightly hyperexpanded.  No consolidation or pneumothorax.    4:29 PM  2nd troponin trending down to 0.028 after being elevated at 0.055 with first draw.  Patient taken off oxygen, and we ambulated down the hallway with nurse. He became tachypneic and desatted to 81% on room air.  Patient placed back on oxygen and O2 sats went back within normal limits.  According to chart review, patient normally has ambulatory sats within normal limits on RA for him prior to discharge.  Given history and physical exam, patient be admitted to Hospital Medicine for further evaluation management of hypoxia in this patient with COPD.  Other:   I have discussed this case with another health care provider.    I have reviewed patient's chart and discussed this case with my supervising MD.     Rosemarie Richter PA-C  Emergent Department  [facility] #[id number] or #[phone]                Attending Attestation:     Physician Attestation Statement for NP/PA:   I discussed this assessment and plan of this patient with the NP/PA, but I did not personally examine the patient. The face to face encounter was performed by the NP/PA.          Attending ED Notes:   Increasing frequency of ED visits with poor access to care and follow up. Concern for oxygen dependence and no access to oxygen outside of his home. Clearly Is oxygen dependent and requires admission. Would benefit from social work intervention as well.                         Clinical Impression:       ICD-10-CM ICD-9-CM   1. Hypoxia  R09.02 799.02   2. SOB (shortness of breath)  R06.02 786.05   3. COPD exacerbation  J44.1 491.21   4. Acute on chronic respiratory failure with hypoxia  J96.21 518.84     799.02   5. Debility  R53.81 799.3          Disposition:   Disposition: Placed in Observation  Condition: Stable     ED Disposition Condition    Observation                           Rosemarie Richter PA-C  06/04/20 1747       Aldo Patel MD  06/05/20 1037       Rosemarie Richter PA-C  06/14/20 0944

## 2020-06-04 NOTE — ED TRIAGE NOTES
Patient is a 60 year old male that presents to ED via EMS with c/o SOB. Patient has hx of COPD and uses 2 L NC at home. Patient was at work today and became SOB. Patient arrives with 18 G IV to left forearm and on 3 L NC.

## 2020-06-04 NOTE — ED NOTES
"ED  (SW) entered pt's room with permission. Pt is sitting upright in bed watching television. Pt confirmed identifiers.    SW assessed pt discharge plans. Pt reports that he lives in an apartment with his nephew. He reports that he works full time as a / . He reports that he does not have oxygen tanks but a "machine that outs oxygen in the air." However ED Physician confirmed that pt does have home oxygen? After this visit. Pt reports that he does have a "inhaler"?/ nebulizer but they have been empty since Monday. Pt reports that "i've been using them more and its too early to be refilled. Pt reports that he does not receive home health services.    SW consulted with Physician consultant and ED Physician of her assessment. Per chart review portable oxygen and an home oxygen concentrator was delivered to pt on 04/23/2020.     SW will follow up with inpatient SW as needed.     - Marixa Medel, [facility]   [phone]    "

## 2020-06-04 NOTE — ED NOTES
Patient on continuous cardiac monitor, automatic blood pressure cuff and continuous pulse oximeter. VSS. AAOx3. Resting comfortably in stretcher. Side rails up and bed in lowest position. Call light and urinal within reach. Instructed patient to call staff for assistance with mobility. Will continue to monitor.

## 2020-06-04 NOTE — H&P
Hospital Medicine  History and Physical Exam    Team: Networked reference to record PCT  John Ortega MD  Admit Date: 6/4/2020  Principal Problem:  Acute on chronic respiratory failure with hypoxia and hypercapnia   Patient information was obtained from patient, past medical records and ER records.   Primary care Physician: [facility]  Code status: Full Code    HPI: 61 yo M with PMHx COPD on 2L NC nightly, HTN, and CAD who presents to the ED complaining of worsening SOB x1 day. The patient reports that he has chronic SOB that has been ongoing for many months. The last couple of days have been exhausting for him however. He states that his boss sent him home from his work as a  and  because of SOB yesterday. Today, he tried to work again, and he was so exhausted and breathing so heavily that his boss called EMS. The patient denies any new specific symptoms such as cough, fevers, chills, chest pain, palpitations, or lightheadedness. He just reports that his chronic SOB seems to be worse. At baseline, the patietn reports that he wears 2L NC at home mainly at nights. He does not have a portable tank that he can take with him, however, and he does not wear O2 while at work.    Hemoglobin A1C   Date Value Ref Range Status   03/15/2020 5.4 4.0 - 5.6 % Final     Comment:     ADA Screening Guidelines:  5.7-6.4%  Consistent with prediabetes  >or=6.5%  Consistent with diabetes  High levels of fetal hemoglobin interfere with the HbA1C  assay. Heterozygous hemoglobin variants (HbS, HgC, etc)do  not significantly interfere with this assay.   However, presence of multiple variants may affect accuracy.     08/12/2018 5.1 4.0 - 5.6 % Final     Comment:     ADA Screening Guidelines:  5.7-6.4%  Consistent with prediabetes  >or=6.5%  Consistent with diabetes  High levels of fetal hemoglobin interfere with the HbA1C  assay. Heterozygous hemoglobin variants (HbS, HgC, etc)do  not significantly  interfere with this assay.   However, presence of multiple variants may affect accuracy.         Past Medical History: Patient has a past medical history of Asthma, COPD (chronic obstructive pulmonary disease), Coronary artery disease, Hypertension, Pneumonia, and Seizures.    Past Surgical History: Patient has a past surgical history that includes Left heart catheterization (4/23/2020).    Social History: Patient reports that he has been smoking cigarettes. He has been smoking about 0.25 packs per day. He has never used smokeless tobacco. He reports that he drinks about 2.0 standard drinks of alcohol per week. He reports that he does not use drugs.    Family History: family history includes Cancer in his father; Hypertension in his sister; Stroke in his brother and sister.    Medications: reviewed     Allergies: Patient is allergic to benazepril and duoneb [ipratropium-albuterol].    ROS  Pain Scale: 0 /10   Constitutional: no fever or chills  Respiratory: Positive for REES  Cardiovascular: no chest pain or palpitations  Gastrointestinal: no nausea or vomiting, no abdominal pain or change in bowel habits  Genitourinary: no hematuria or dysuria  Integument/Breast: no rash or pruritis  Hematologic/Lymphatic: no easy bruising or lymphadenopathy  Musculoskeletal: no arthralgias or myalgias  Neurological: no seizures or tremors  Behavioral/Psych: no depression or anxiety    PEx  Temp:  [98.3 °F (36.8 °C)]   Pulse:  [73-93]   Resp:  [17-20]   BP: (127-178)/()   SpO2:  [90 %-100 %]   Body mass index is 20.53 kg/m².     Intake/Output Summary (Last 24 hours) at 6/4/2020 9778  Last data filed at 6/4/2020 1512  Gross per 24 hour   Intake --   Output 350 ml   Net -350 ml     General appearance: no distress, pt. Resting comfortably  Mental status: Alert and oriented x 3  HEENT:  conjunctivae/corneas clear, PERRL  Neck: supple, thyroid not enlarged  Pulm: Decreased breath sounds diffusely, bibasilar crackles  Card: RRR, S1,  S2 normal, no murmur, click, rub or gallop  Abd: soft, NT, ND, BS present; no masses, no organomegaly  Ext: no c/c/e  Pulses: 2+, symmetric  Skin: color, texture, turgor normal. No rashes or lesions  Neuro: CN II-XII grossly intact, no focal numbness or weakness, normal strength and tone     Recent Results (from the past 24 hour(s))   Brain natriuretic peptide    Collection Time: 06/04/20 12:18 PM   Result Value Ref Range     (H) 0 - 99 pg/mL   CBC auto differential    Collection Time: 06/04/20 12:18 PM   Result Value Ref Range    WBC 7.82 3.90 - 12.70 K/uL    RBC 4.10 (L) 4.60 - 6.20 M/uL    Hemoglobin 11.6 (L) 14.0 - 18.0 g/dL    Hematocrit 36.1 (L) 40.0 - 54.0 %    Mean Corpuscular Volume 88 82 - 98 fL    Mean Corpuscular Hemoglobin 28.3 27.0 - 31.0 pg    Mean Corpuscular Hemoglobin Conc 32.1 32.0 - 36.0 g/dL    RDW 17.1 (H) 11.5 - 14.5 %    Platelets 217 150 - 350 K/uL    MPV 10.5 9.2 - 12.9 fL    Immature Granulocytes 0.4 0.0 - 0.5 %    Gran # (ANC) 4.9 1.8 - 7.7 K/uL    Immature Grans (Abs) 0.03 0.00 - 0.04 K/uL    Lymph # 1.7 1.0 - 4.8 K/uL    Mono # 0.9 0.3 - 1.0 K/uL    Eos # 0.2 0.0 - 0.5 K/uL    Baso # 0.06 0.00 - 0.20 K/uL    nRBC 0 0 /100 WBC    Gran% 62.8 38.0 - 73.0 %    Lymph% 21.4 18.0 - 48.0 %    Mono% 11.9 4.0 - 15.0 %    Eosinophil% 2.7 0.0 - 8.0 %    Basophil% 0.8 0.0 - 1.9 %    Differential Method Automated    Comprehensive metabolic panel    Collection Time: 06/04/20 12:18 PM   Result Value Ref Range    Sodium 138 136 - 145 mmol/L    Potassium 3.9 3.5 - 5.1 mmol/L    Chloride 103 95 - 110 mmol/L    CO2 24 23 - 29 mmol/L    Glucose 59 (L) 70 - 110 mg/dL    BUN, Bld 3 (L) 6 - 20 mg/dL    Creatinine 0.8 0.5 - 1.4 mg/dL    Calcium 9.2 8.7 - 10.5 mg/dL    Total Protein 7.3 6.0 - 8.4 g/dL    Albumin 3.5 3.5 - 5.2 g/dL    Total Bilirubin 0.7 0.1 - 1.0 mg/dL    Alkaline Phosphatase 105 55 - 135 U/L    AST 38 10 - 40 U/L    ALT 26 10 - 44 U/L    Anion Gap 11 8 - 16 mmol/L    eGFR if African  American >60.0 >60 mL/min/1.73 m^2    eGFR if non African American >60.0 >60 mL/min/1.73 m^2   Troponin I    Collection Time: 06/04/20 12:18 PM   Result Value Ref Range    Troponin I 0.055 (H) 0.000 - 0.026 ng/mL   C-Reactive Protein    Collection Time: 06/04/20 12:18 PM   Result Value Ref Range    CRP 55.5 (H) 0.0 - 8.2 mg/L   Ferritin    Collection Time: 06/04/20 12:18 PM   Result Value Ref Range    Ferritin 1,016 (H) 20.0 - 300.0 ng/mL   Lactate Dehydrogenase    Collection Time: 06/04/20 12:18 PM   Result Value Ref Range     110 - 260 U/L   CK    Collection Time: 06/04/20 12:18 PM   Result Value Ref Range     20 - 200 U/L   Lactic Acid, Plasma    Collection Time: 06/04/20 12:18 PM   Result Value Ref Range    Lactate (Lactic Acid) 1.9 0.5 - 2.2 mmol/L   Procalcitonin    Collection Time: 06/04/20 12:18 PM   Result Value Ref Range    Procalcitonin 0.09 <0.25 ng/mL   COVID-19 Rapid Screening    Collection Time: 06/04/20 12:19 PM   Result Value Ref Range    SARS-CoV-2 RNA, Amplification, Qual Negative Negative   Troponin I    Collection Time: 06/04/20  3:02 PM   Result Value Ref Range    Troponin I 0.028 (H) 0.000 - 0.026 ng/mL       No results for input(s): POCTGLUCOSE in the last 168 hours.    Active Hospital Problems    Diagnosis  POA    COPD exacerbation [J44.1]  Yes      Resolved Hospital Problems   No resolved problems to display.       Assessment and Plan:  Acute on Chronic respiratory failure with hypoxia  COPD Exacerbation  -Pt. Reportedly more tachypneic and desaturates to 81% on RA with ambulation (although pt. Is prescribed home O2 so has known baseline hypoxia)  -Decreased breath sounds on exam along with bibasilar crackles. No noted pulmonary edema on CXR, but BNP mildly elevated from baseline at 129, will give lasix dose x1  -Otherwise treat as COPD exacerbation, duonebs scheduled Q4hrs and prednisone daily  -No reported change in sputum quality or quantity, no signs of infection, and  procal normal. Will hold off on abx  -Continue home inhalers  -Pt. May require portable O2 at discharge. Suspect admission from mild worsening of his chronic problem, but his baseline hypoxia may never improve to the point where he can work regularly without developing SOB    Nonobstructive atherosclerosis of coronary artery  Chronic Systolic HF  -Recent LHC shows non-obstructive disease, TTE shows recovered EF  -No evidence of volume overload on exam or pulmonary edema on CXR. BNP is slightly elevated. Cardiac diet ordered with fluid restriction. Lasix x1 as above  -No other acute issues, pt. Denies chest pain. Troponin very mildly elevated on admit but trended down  -Continue meds as prescribed by cardiology, ASA, statin, B-blocker, and bidil     Essential hypertension  -Continue metoprolol and bidil     DVT PPx: Loveanthonyx    John Ortega MD  Hospital Medicine Staff  [phone] pager

## 2020-06-05 LAB
ALBUMIN SERPL BCP-MCNC: 3.3 G/DL (ref 3.5–5.2)
ALP SERPL-CCNC: 105 U/L (ref 55–135)
ALT SERPL W/O P-5'-P-CCNC: 25 U/L (ref 10–44)
ANION GAP SERPL CALC-SCNC: 14 MMOL/L (ref 8–16)
AST SERPL-CCNC: 29 U/L (ref 10–40)
BASOPHILS # BLD AUTO: 0.02 K/UL (ref 0–0.2)
BASOPHILS NFR BLD: 0.4 % (ref 0–1.9)
BILIRUB SERPL-MCNC: 0.7 MG/DL (ref 0.1–1)
BUN SERPL-MCNC: 9 MG/DL (ref 6–20)
CALCIUM SERPL-MCNC: 9.7 MG/DL (ref 8.7–10.5)
CHLORIDE SERPL-SCNC: 98 MMOL/L (ref 95–110)
CO2 SERPL-SCNC: 26 MMOL/L (ref 23–29)
CREAT SERPL-MCNC: 0.9 MG/DL (ref 0.5–1.4)
DIFFERENTIAL METHOD: ABNORMAL
EOSINOPHIL # BLD AUTO: 0 K/UL (ref 0–0.5)
EOSINOPHIL NFR BLD: 0 % (ref 0–8)
ERYTHROCYTE [DISTWIDTH] IN BLOOD BY AUTOMATED COUNT: 17.1 % (ref 11.5–14.5)
EST. GFR  (AFRICAN AMERICAN): >60 ML/MIN/1.73 M^2
EST. GFR  (NON AFRICAN AMERICAN): >60 ML/MIN/1.73 M^2
GLUCOSE SERPL-MCNC: 141 MG/DL (ref 70–110)
HCT VFR BLD AUTO: 37.2 % (ref 40–54)
HGB BLD-MCNC: 11.8 G/DL (ref 14–18)
IMM GRANULOCYTES # BLD AUTO: 0.01 K/UL (ref 0–0.04)
IMM GRANULOCYTES NFR BLD AUTO: 0.2 % (ref 0–0.5)
LYMPHOCYTES # BLD AUTO: 0.4 K/UL (ref 1–4.8)
LYMPHOCYTES NFR BLD: 6.9 % (ref 18–48)
MCH RBC QN AUTO: 27.6 PG (ref 27–31)
MCHC RBC AUTO-ENTMCNC: 31.7 G/DL (ref 32–36)
MCV RBC AUTO: 87 FL (ref 82–98)
MONOCYTES # BLD AUTO: 0.1 K/UL (ref 0.3–1)
MONOCYTES NFR BLD: 1.3 % (ref 4–15)
NEUTROPHILS # BLD AUTO: 5 K/UL (ref 1.8–7.7)
NEUTROPHILS NFR BLD: 91.2 % (ref 38–73)
NRBC BLD-RTO: 0 /100 WBC
PLATELET # BLD AUTO: 245 K/UL (ref 150–350)
PMV BLD AUTO: 9.9 FL (ref 9.2–12.9)
POTASSIUM SERPL-SCNC: 4.4 MMOL/L (ref 3.5–5.1)
PROT SERPL-MCNC: 7.5 G/DL (ref 6–8.4)
RBC # BLD AUTO: 4.27 M/UL (ref 4.6–6.2)
SODIUM SERPL-SCNC: 138 MMOL/L (ref 136–145)
WBC # BLD AUTO: 5.47 K/UL (ref 3.9–12.7)

## 2020-06-05 PROCEDURE — 25000003 PHARM REV CODE 250: Performed by: HOSPITALIST

## 2020-06-05 PROCEDURE — 94640 AIRWAY INHALATION TREATMENT: CPT

## 2020-06-05 PROCEDURE — S4991 NICOTINE PATCH NONLEGEND: HCPCS | Performed by: INTERNAL MEDICINE

## 2020-06-05 PROCEDURE — 99225 PR SUBSEQUENT OBSERVATION CARE,LEVEL II: ICD-10-PCS | Mod: ,,, | Performed by: INTERNAL MEDICINE

## 2020-06-05 PROCEDURE — 25000242 PHARM REV CODE 250 ALT 637 W/ HCPCS: Performed by: INTERNAL MEDICINE

## 2020-06-05 PROCEDURE — 97165 OT EVAL LOW COMPLEX 30 MIN: CPT

## 2020-06-05 PROCEDURE — 94761 N-INVAS EAR/PLS OXIMETRY MLT: CPT

## 2020-06-05 PROCEDURE — 63600175 PHARM REV CODE 636 W HCPCS: Performed by: HOSPITALIST

## 2020-06-05 PROCEDURE — 80053 COMPREHEN METABOLIC PANEL: CPT

## 2020-06-05 PROCEDURE — 99225 PR SUBSEQUENT OBSERVATION CARE,LEVEL II: CPT | Mod: ,,, | Performed by: INTERNAL MEDICINE

## 2020-06-05 PROCEDURE — 27000221 HC OXYGEN, UP TO 24 HOURS

## 2020-06-05 PROCEDURE — 36415 COLL VENOUS BLD VENIPUNCTURE: CPT

## 2020-06-05 PROCEDURE — 25000003 PHARM REV CODE 250: Performed by: INTERNAL MEDICINE

## 2020-06-05 PROCEDURE — 97110 THERAPEUTIC EXERCISES: CPT

## 2020-06-05 PROCEDURE — 96372 THER/PROPH/DIAG INJ SC/IM: CPT

## 2020-06-05 PROCEDURE — 85025 COMPLETE CBC W/AUTO DIFF WBC: CPT

## 2020-06-05 PROCEDURE — 97161 PT EVAL LOW COMPLEX 20 MIN: CPT

## 2020-06-05 PROCEDURE — 25000242 PHARM REV CODE 250 ALT 637 W/ HCPCS: Performed by: HOSPITALIST

## 2020-06-05 PROCEDURE — G0378 HOSPITAL OBSERVATION PER HR: HCPCS

## 2020-06-05 RX ORDER — FLUTICASONE FUROATE AND VILANTEROL 200; 25 UG/1; UG/1
1 POWDER RESPIRATORY (INHALATION) DAILY
Status: DISCONTINUED | OUTPATIENT
Start: 2020-06-05 | End: 2020-06-06 | Stop reason: HOSPADM

## 2020-06-05 RX ORDER — IBUPROFEN 200 MG
24 TABLET ORAL
Status: DISCONTINUED | OUTPATIENT
Start: 2020-06-05 | End: 2020-06-06 | Stop reason: HOSPADM

## 2020-06-05 RX ORDER — IBUPROFEN 200 MG
16 TABLET ORAL
Status: DISCONTINUED | OUTPATIENT
Start: 2020-06-05 | End: 2020-06-06 | Stop reason: HOSPADM

## 2020-06-05 RX ORDER — NICOTINE 7MG/24HR
1 PATCH, TRANSDERMAL 24 HOURS TRANSDERMAL DAILY
Status: DISCONTINUED | OUTPATIENT
Start: 2020-06-05 | End: 2020-06-06 | Stop reason: HOSPADM

## 2020-06-05 RX ORDER — PREDNISONE 20 MG/1
40 TABLET ORAL DAILY
Status: DISCONTINUED | OUTPATIENT
Start: 2020-06-06 | End: 2020-06-06 | Stop reason: HOSPADM

## 2020-06-05 RX ORDER — GLUCAGON 1 MG
1 KIT INJECTION
Status: DISCONTINUED | OUTPATIENT
Start: 2020-06-05 | End: 2020-06-06 | Stop reason: HOSPADM

## 2020-06-05 RX ADMIN — ATORVASTATIN CALCIUM 40 MG: 40 TABLET, FILM COATED ORAL at 09:06

## 2020-06-05 RX ADMIN — IPRATROPIUM BROMIDE AND ALBUTEROL SULFATE 3 ML: .5; 3 SOLUTION RESPIRATORY (INHALATION) at 03:06

## 2020-06-05 RX ADMIN — PREDNISONE 40 MG: 20 TABLET ORAL at 09:06

## 2020-06-05 RX ADMIN — IPRATROPIUM BROMIDE AND ALBUTEROL SULFATE 3 ML: .5; 3 SOLUTION RESPIRATORY (INHALATION) at 08:06

## 2020-06-05 RX ADMIN — IPRATROPIUM BROMIDE AND ALBUTEROL SULFATE 3 ML: .5; 3 SOLUTION RESPIRATORY (INHALATION) at 12:06

## 2020-06-05 RX ADMIN — METOPROLOL TARTRATE 25 MG: 25 TABLET, FILM COATED ORAL at 08:06

## 2020-06-05 RX ADMIN — FLUTICASONE FUROATE AND VILANTEROL TRIFENATATE 1 PUFF: 200; 25 POWDER RESPIRATORY (INHALATION) at 08:06

## 2020-06-05 RX ADMIN — PANTOPRAZOLE SODIUM 40 MG: 40 TABLET, DELAYED RELEASE ORAL at 09:06

## 2020-06-05 RX ADMIN — NICOTINE 1 PATCH: 7 PATCH TRANSDERMAL at 12:06

## 2020-06-05 RX ADMIN — ENOXAPARIN SODIUM 40 MG: 100 INJECTION SUBCUTANEOUS at 05:06

## 2020-06-05 RX ADMIN — HYDRALAZINE HYDROCHLORIDE AND ISOSORBIDE DINITRATE 1 TABLET: 37.5; 2 TABLET, FILM COATED ORAL at 08:06

## 2020-06-05 RX ADMIN — TIOTROPIUM BROMIDE 18 MCG: 18 CAPSULE ORAL; RESPIRATORY (INHALATION) at 08:06

## 2020-06-05 RX ADMIN — ASPIRIN 81 MG: 81 TABLET, COATED ORAL at 09:06

## 2020-06-05 RX ADMIN — ACETAMINOPHEN 650 MG: 325 TABLET ORAL at 05:06

## 2020-06-05 RX ADMIN — HYDRALAZINE HYDROCHLORIDE AND ISOSORBIDE DINITRATE 1 TABLET: 37.5; 2 TABLET, FILM COATED ORAL at 09:06

## 2020-06-05 RX ADMIN — METOPROLOL TARTRATE 25 MG: 25 TABLET, FILM COATED ORAL at 09:06

## 2020-06-05 NOTE — PLAN OF CARE
LINA sanchez completed and goals established 6/5/2020  CHRISS Wilson/CRIS  Pager #: [phone]  6/5/2020    Problem: Occupational Therapy Goal  Goal: Occupational Therapy Goal  Description  Goals to be met by: 6/12/2020    Patient will increase functional independence with ADLs by performing:    Grooming while standing with independence.   Toileting from toilet with [place] for hygiene and clothing management.   Bathing from standing at sink with Supervision.  Toilet transfer to toilet with [place].     Outcome: Ongoing, Progressing

## 2020-06-05 NOTE — PLAN OF CARE
06/05/20 1040   Discharge Assessment   Assessment Type Discharge Planning Assessment   Confirmed/corrected address and phone number on facesheet? Yes   Assessment information obtained from? Patient   Expected Length of Stay (days) 2   Communicated expected length of stay with patient/caregiver yes   Prior to hospitilization cognitive status: Alert/Oriented   Prior to hospitalization functional status: Independent   Current cognitive status: Alert/Oriented   Current Functional Status: Independent   Lives With other relative(s)  (adult nephew-Christopher Mccray)   Able to Return to Prior Arrangements yes   Is patient able to care for self after discharge? Yes   Patient's perception of discharge disposition home or selfcare   Readmission Within the Last 30 Days no previous admission in last 30 days   Patient currently being followed by outpatient case management? No   Patient currently receives any other outside agency services? No   Equipment Currently Used at Home nebulizer;oxygen   Do you have any problems affording any of your prescribed medications? No   Is the patient taking medications as prescribed? yes   Does the patient have transportation home? Yes   Transportation Anticipated public transportation   Does the patient receive services at the [facility]? No   Discharge Plan A Home with family   Discharge Plan B Home Health   DME Needed Upon Discharge  other (see comments)  (portable concentrator)   Patient/Family in Agreement with Plan yes     Dx: acute on chronic resp failure w/hypoxia & hypercapnia  Consult: PT/OT  Pharm: [facility]  Hosp f/u appt: Dr. Patrick Celis (PCP at [facility]) on 6/11/2020 at 1215    Patient has home oxygen & a portable tank provided by [facility]. Previously the patient was only requiring O2 at night but has needed O2 more frequently. Patient unable to use his portable O2 tank at work & would like a portable concentrator. CM was informed by Margarette  w/[facility] that the patient's [employer] Medicaid will not pay for a portable concentrator. CM informed Dr. Montenegro of above.     Will continue to follow.

## 2020-06-05 NOTE — PT/OT/SLP EVAL
Occupational Therapy   Evaluation    Name: Baltazar Mccray  MRN: [id number]  Admitting Diagnosis:  Acute on chronic respiratory failure with hypoxia and hypercapnia      Recommendations:     Discharge Recommendations: Rehabilitation Facility  Discharge Equipment Recommendations:  shower chair  Barriers to discharge:  Inaccessible home environment    Assessment:     Baltazar Mccray is a 60 y.o. male with a medical diagnosis of Acute on chronic respiratory failure with hypoxia and hypercapnia. He presents with the following prformance deficits affecting function: weakness, impaired endurance, impaired self care skills, impaired functional mobilty, gait instability, impaired balance, impaired cardiopulmonary response to activity. Patient agreed to therapy evaluation but performance limited by decreased activity tolerance. Patient c/o feeling SOB after ambulating 30 feet requiring seated rest period. Patient became slightly anxious at this time requiring cues to relax and perform pers lip breathing. SPO2 95% and HR 88. At this time, patient will continue to benefit from acute skilled therapy intervention to address deficits/underlying impairments and progress towards prior level of function. After discharge, patient will benefit from continuing therapy at rehab facility to increase independence in ADLs and functional mobility through skilled balance training and skilled therapeutic exercise to promote safety at home.    Rehab Prognosis: Good; patient would benefit from acute skilled OT services to address these deficits and reach maximum level of function.       Plan:     Patient to be seen 4 x/week to address the above listed problems via self-care/home management, therapeutic activities, therapeutic exercises  · Plan of Care Expires: 07/04/20  · Plan of Care Reviewed with: patient    Subjective     Chief Complaint: SOB    Occupational Profile:  Living Environment: Patient lives in an apartment on the 3rd floor with his nephew.  There is not an elevator at his apartment complex. Patient has a walk in shower.  Previous level of function: Independent with ADLs and mobility using no AD.  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 Patient walks to the bus stop from his apartment and uses the bus for transportation.   Equipment Used at Home: oxygen  Assistance upon Discharge: Nephew will be able to assist    Pain/Comfort:  · Pain Rating 1: 0/10    Patients cultural, spiritual, Mormon conflicts given the current situation: no    Objective:     Communicated with: RN prior to session. Patient found HOB elevated with telemetry, oxygen upon OT entry to room.    General Precautions: Standard, fall   Orthopedic Precautions:N/A   Braces: N/A     Occupational Performance:    Bed Mobility:    · Patient completed Scooting/Bridging with supervision  · Patient completed Sit to Supine with supervision    Functional Mobility/Transfers:  · Patient completed Sit <> Stand Transfer with no assistive device   · 1x from EOB with SBA  · 2x from chair with CGA  · Patient completed Chair > BedTransfer using Stand Pivot technique with contact guard assistance with no assistive device  · Functional Mobility:   · Patient ambulated ~30 feet in hallway with CGA to min assist using no AD. Patient c/o not being able to breathe at this time and a chair was brought behind patient for seated rest period. SPO2 95% and HR 88  · Mask donned before leaving room and patient remained on 2L O2 entire session    Activities of Daily Living:  · Lower Body Dressing: supervision doffing/donning socks sitting EOB    Cognitive/Visual Perceptual:  Cognitive/Psychosocial Skills:     -       Oriented to: Person, Place, Time and Situation   -       Follows Commands/attention:Follows multistep  commands  -       Communication: clear/fluent  -       Memory: No Deficits noted  -       Safety awareness/insight to disability: intact   -        Mood/Affect/Coping skills/emotional control: Appropriate to situation, Cooperative and Pleasant    Physical Exam:  Dominant hand:    -       Right  Upper Extremity Range of Motion:     -       Right Upper Extremity: WNL  -       Left Upper Extremity: WNL  Upper Extremity Strength:    -       Right Upper Extremity: WNL  -       Left Upper Extremity: WNL   Strength:    -       Right Upper Extremity: WNL  -       Left Upper Extremity: WNL  Fine Motor Coordination:    -       Intact  Gross motor coordination:   WFL    AMPAC 6 Click ADL:  AMPAC Total Score: 22    Treatment & Education:   Therapist provided facilitation and instruction of proper body mechanics, energy conservation, and fall prevention strategies during tasks listed above.   Instructed patient to sit in bedside chair daily to increase OOB/activity tolerance.   Instructed patient to use call light to have nursing staff assist with transfers.    Educated patient on OT POC and answered all questions within OT scope of practice.   Whiteboard updated   Education:    Patient left HOB elevated with all lines intact, call button in reach, bed alarm on and RN notified    GOALS:   Multidisciplinary Problems     Occupational Therapy Goals        Problem: Occupational Therapy Goal    Goal Priority Disciplines Outcome Interventions   Occupational Therapy Goal     OT, PT/OT Ongoing, Progressing    Description:  Goals to be met by: 6/12/2020    Patient will increase functional independence with ADLs by performing:    Grooming while standing with independence.   Toileting from toilet with [place] for hygiene and clothing management.   Bathing from standing at sink with Supervision.  Toilet transfer to toilet with [place].                      History:     Past Medical History:   Diagnosis Date    Asthma     COPD (chronic obstructive pulmonary disease)     home O2 at night only    Coronary artery disease     Hypertension     Pneumonia     Seizures         Past Surgical History:   Procedure Laterality Date    LEFT HEART CATHETERIZATION  4/23/2020    Procedure: Left heart cath;  Surgeon: Nic Pollack MD;  Location: [facility] CATH LAB;  Service: Cardiology;;       Time Tracking:     OT Date of Treatment: 06/05/20  OT Start Time: 0813  OT Stop Time: 0833  OT Total Time (min): 20 min (co-tx with PT)    Billable Minutes:Evaluation 15    Laquita Barajas OT  6/5/2020

## 2020-06-05 NOTE — NURSING
Pt transferred from ED to room 725. Pt oriented to room, safety, and fall precautions discussed. Pt has no complaints at this time. Will continue to monitor.

## 2020-06-05 NOTE — PLAN OF CARE
Problem: Physical Therapy Goal  Goal: Physical Therapy Goal  Description  Goals to be met by: 20    Patient will increase functional independence with mobility by performin. Sit to stand transfer x 5 repetitions with Supervision, no LOB   2. Gait  x 150 feet with Supervision with or without using least restrictive AD, with vital signs stable and no LOB   3. Ascend/descend 1 flight of stairs with right Handrails Stand-by Assistance  4. Lower extremity exercise program x20 reps per handout, with supervision     Outcome: Ongoing, Progressing     PT evaluation completed- see note for details. Goals established and POC initiated.     Alma Snow, PT, DPT   2020  Pager: [phone]

## 2020-06-05 NOTE — PROGRESS NOTES
Hospital Medicine  Progress Note      Patient Name: Baltazar Mccray  MRN: [id number]  Date of Admission: 6/4/2020     Principal Problem: Acute on chronic respiratory failure with hypoxia and hypercapnia     Subjective     Mr. Mccray was admitted overnight for worsening SOB likely due to COPD exacerbation vs progression of disease. Made 1.35L urine after lasix yesterday.  He remained afebrile and HD stable overnight. Pt at his baseline 2L NC this morning with sats at goal. Denies a cough, fevers, chills, cp and states sob has improved since admission. CM attempting to obtain portable oxygen unit for work, however, device is not covered by his insurance.       Review of Systems     Constitutional: Negative for chills, fatigue, fever.   HENT: Negative for sore throat, trouble swallowing.    Eyes: Negative for photophobia, visual disturbance.   Respiratory: Negative for cough, POSITIVE for shortness of breath.    Cardiovascular: Negative for chest pain, palpitations, leg swelling.   Gastrointestinal: Negative for abdominal pain, constipation, diarrhea, nausea, vomiting.   Endocrine: Negative for cold intolerance, heat intolerance.   Genitourinary: Negative for dysuria, frequency.   Musculoskeletal: Negative for arthralgias, myalgias.   Skin: Negative for rash, wound, erythema   Neurological: Negative for dizziness, syncope, weakness, light-headedness.   Psychiatric/Behavioral: Negative for confusion, hallucinations, anxiety    Medications  Scheduled Meds:   albuterol-ipratropium  3 mL Nebulization Q4H WAKE    aspirin  81 mg Oral Daily    atorvastatin  40 mg Oral Daily    enoxaparin  40 mg Subcutaneous Daily    fluticasone furoate-vilanteroL  1 puff Inhalation Daily    isosorbide-hydrALAZINE 20-37.5 mg  1 tablet Oral BID    metoprolol tartrate  25 mg Oral BID    pantoprazole  40 mg Oral Daily    predniSONE  40 mg Oral Daily    tiotropium  18 mcg Inhalation Daily     Continuous Infusions:  PRN Meds:.acetaminophen,  Dextrose 10% Bolus, glucose, glucose, melatonin, ondansetron, prochlorperazine, senna, sodium chloride 0.9%    Objective    Physical Examination    Temp:  [97.2 °F (36.2 °C)-98.6 °F (37 °C)]   Pulse:  []   Resp:  [16-20]   BP: (127-187)/()   SpO2:  [90 %-100 %]     Gen: NAD, conversant  Head: NC, AT  Eyes: PERRLA, EOMI  Throat: MMM, OP clear- poor dentition  CV: RRR, no M/R/G, no peripheral edema, no JVD  Resp: coarse breath sounds at bases, no increased work of breathing on NC, no wheezing  GI: Soft, NT, ND, +BS  Ext: MAEW, no c/c/e  Neuro: AAOx3, CN grossly intact, no focal neurologic deficits  Psychiatry: Normal mood, normal affect, no SI/HI    CBC  Recent Labs   Lab 06/04/20  1218 06/05/20  0607   WBC 7.82 5.47   HGB 11.6* 11.8*   HCT 36.1* 37.2*    245     CMP  Recent Labs   Lab 06/04/20  1218 06/05/20  0607    138   K 3.9 4.4    98   CO2 24 26   BUN 3* 9   CREATININE 0.8 0.9   GLU 59* 141*   CALCIUM 9.2 9.7   ALKPHOS 105 105   ALT 26 25   AST 38 29   ALBUMIN 3.5 3.3*   PROT 7.3 7.5   BILITOT 0.7 0.7           Hospital Course:  See above    Assessment and Plan:    Acute on Chronic respiratory failure with hypoxia  COPD Exacerbation  -Pt. Reportedly more tachypneic and desaturates to 81% on RA with ambulation (although pt is prescribed home O2, so has known baseline hypoxia)  -Decreased breath sounds on exam along with bibasilar crackles. No noted pulmonary edema on CXR, but BNP mildly elevated from baseline at 129. given lasix dose x1  --continue to treat for copd exacerbation with pred 40mg q d x 5 days  --no indication for abx at this time  --continue home inhalers PLUS duonebs q 4hr  --pt requires portable O2 unit for work, however, not covered by his insurance  --will try to wean off O2 further, since states he only uses O2 at night, but suspect pt with progression of copd and worsening baseline hypoxia            Nonobstructive atherosclerosis of coronary artery  Chronic  Systolic HF  -Recent LHC shows non-obstructive disease, TTE shows recovered EF  -No evidence of volume overload on exam or pulmonary edema on CXR. BNP is slightly elevated. Cardiac diet ordered with fluid restriction. Lasix x1 as above  -No other acute issues, pt. Denies chest pain. Troponin very mildly elevated on admit but trended down  -Continue meds as prescribed by cardiology, ASA, statin, B-blocker, and bidil     Essential hypertension  -Continue metoprolol and bidil    Lifetime cigarette smoker  --1/2 PPD up until 1 week ago when he claims to have quit smoking  --nicotine patch ordered  --counseled on tobacco cessation    Diet: cardiac  VTE PPX: lovenox  Goals of care: full  Dispo: home 1-2 days    Irene Montenegro M.D.  Department of Hospital Medicine  [facility]  [phone] (pager)

## 2020-06-05 NOTE — PLAN OF CARE
Safety measures maintained. VSS on 2LNC. Pt had no complaints of pain throughout shift. Bed in lowest position, call light within reach, side rails up x2. Pt has no complaints at this time. Will continue to monitor.

## 2020-06-05 NOTE — PT/OT/SLP EVAL
"Physical Therapy Evaluation  & Treatment     Patient Name:  Baltazar Mccray  MRN: [id number]    Recommendations:     Discharge Recommendations: Inpatient Rehabilitation Facility   Equipment recommendations: shower chair  Barriers to discharge: Inaccessible home and Fall risk     Assessment:     Baltazar Mccray is a 60 y.o. male admitted to [facility] [place] 6/4/2020 with medical diagnosis of acute on chronic respiratory failure with hypoxia and hypercapnia. Pt presents with impaired balance, decreased endurance, impaired cardiopulmonary response to activity, impaired functional mobility  and gait instability. Pt with episode of significant SOB upon exertion after ambulating 30 ft, and experiencing increased instability in standing once SOB. Pt at risk for falls and is currently unable to ambulate functional distances or navigate stairs safely.   Baltazar Mccray would benefit from acute PT intervention to address listed functional deficits, provide patient/caregiver education, reduce fall risk, and maximize (I) and safety with functional mobility. Once medically stable, recommending pt discharge to inpatient rehab facility.     Rehab Prognosis: Good; patient would benefit from acute skilled PT services to address these deficits and reach maximum level of function.      Plan:     During this hospitalization, patient to be seen 4 x/week to address the identified rehab impairments via gait training, therapeutic activities, therapeutic exercises, neuromuscular re-education and progress towards stated goals.     Plan of Care Expires:  07/04/20  Plan of Care reviewed with: patient    This plan of care has been discussed with the patient/caregiver, who was included in its development and is in agreement with the identified goals and treatment plan.     Subjective     Communicated with RN prior to session.  Patient agreeable to participate.      Patient comments/goals: "This came on fast. I had to leave work because I was so short of breath" "     Pain/Comfort:  · Location: no pain reported  · Pain Ratin/10   · Pain Rating Post-Intervention: 0/10     Patients cultural, spiritual, Denominational conflicts given the current situation: No cultural, spiritual, or educational needs identified.    Patient History: information obtained from pt      Living Environment: Pt lives with nephew in 3rd floor apartment with 3 flights of AGUSTIN, no elevator access.   Prior Level of Function: independent with mobility and ADLs; working part-time in restaurant kitchen   DME owned: oxygen   Caregiver Assistance: (A) from nephew as needed (not )     Objective:     Patient found HOB elevated with: telemetry, oxygen    Recent Surgery: * No surgery found *    General Precautions: Standard, fall   Orthopedic Precautions:N/A   Braces: N/A   Body mass index is 19.74 kg/m².  Oxygen Device: nasal cannula at 2 L       Exams:     Cognition:  o Pt is alert and oriented x 4  o Pt's ability to follow single step commands: intact     Sensation:   o Light touch sensation: Intact BLEs     Gross Motor Coordination: No deficits noted during functional mobility tasks      Edema: none noted BLEs      Postural examination/scapula alignment: Rounded shoulder and Head forward     Lower Extremity Range of Motion:  o Right Lower Extremity: WFL  o Left Lower Extremity: WFL     Lower Extremity Strength:  o Right Lower Extremity: 4/5  o Left Lower Extremity: 4/5    Functional Mobility:    Bed Mobility:  · Supine > Sit: Supervision or Set-up Assistance  · Sit > Supine: N/T    Transfers:   · Sit <> Stand Transfer:   · Stand-by Assistance x 1st trial from EOB with no AD   · Contact guard assistance required for 2nd trial from bedside chair with no AD after episode of SOB   · Bed <> Chair: Contact Guard Assistance with no AD              Gait:  · Distance: 30 ft.   · Assistance level: Contact Guard- Minimum Assistance  · Assistive Device: none  · Gait Assessment: Pt remained on supplemental 02 (2L)  during trial, and mask donned due to current COVID precautions in hospital. Pt began gait trial demonstrating reciprocal gait pattern with decreased gait speed, decreased step length. After 30 ft, pt reported significant SOB and was noted to demonstrate labored breathing with increased respiratory rate and shakiness throughout body. Pt required min-mod A to remain stable in standing while OT brought chair up for pt to sit in for rest break. Once seated, pt's 02 sats measuring at 95%. Pt appeared anxious during episode of SOB and displays poor standing balance. RN present and assisting with monitoring of pt. PT safely returned to chair and pushed back to room. Following ~3 minute rest, pt calm and sats remained stable.      Balance:  · Static Sit: Independent at EOB    · Static Stand: ranged from SBA to mod A based on fluctuations of SOB/unsteadiness     Outcome Measure: AM-PAC 6 CLICK MOBILITY  Total Score:20     Patient/Caregiver Education:     Therapist educated pt/caregiver regarding:    PT POC and goals for therapy    Safety with mobility and fall risk    Energy conservation    Pursed lip breathing    Instruction on use of call button and importance of calling nursing staff for assistance with mobility     Patient/caregiver able to verbalize understanding; will follow-up with pt/caregiver during current admit for additional questions/concerns within scope of practice.     White board updated.     Patient left with HOB elevated with all lines intact, call button in reach, bed alarm on and RN notified.    GOALS:   Multidisciplinary Problems     Physical Therapy Goals        Problem: Physical Therapy Goal    Goal Priority Disciplines Outcome Goal Variances Interventions   Physical Therapy Goal     PT, PT/OT Ongoing, Progressing     Description:  Goals to be met by: 20    Patient will increase functional independence with mobility by performin. Sit to stand transfer x 5 repetitions with Supervision,  no LOB   2. Gait  x 150 feet with Supervision with or without using least restrictive AD, with vital signs stable and no LOB   3. Ascend/descend 1 flight of stairs with right Handrails Stand-by Assistance  4. Lower extremity exercise program x20 reps per handout, with supervision                        History:     Past Medical History:   Diagnosis Date    Asthma     COPD (chronic obstructive pulmonary disease)     home O2 at night only    Coronary artery disease     Hypertension     Pneumonia     Seizures        Past Surgical History:   Procedure Laterality Date    LEFT HEART CATHETERIZATION  4/23/2020    Procedure: Left heart cath;  Surgeon: Nic Pollack MD;  Location: [facility] CATH LAB;  Service: Cardiology;;       Time Tracking:     PT Received On: 06/05/20  PT Start Time: 0813     PT Stop Time: 0831  PT Total Time (min): 18 min     Billable Minutes: Evaluation 10 min and Therapeutic Exercise 8 min    Alma Snow, PT  06/05/2020

## 2020-06-05 NOTE — PLAN OF CARE
06/05/20 1133   Post-Acute Status   Post-Acute Authorization Placement   Post-Acute Placement Status Referrals Sent       Saida Watson LMSW  [facility]  [id number]

## 2020-06-06 VITALS
OXYGEN SATURATION: 96 % | DIASTOLIC BLOOD PRESSURE: 71 MMHG | TEMPERATURE: 98 F | BODY MASS INDEX: 19.68 KG/M2 | HEART RATE: 95 BPM | RESPIRATION RATE: 16 BRPM | SYSTOLIC BLOOD PRESSURE: 127 MMHG | WEIGHT: 129.88 LBS | HEIGHT: 68 IN

## 2020-06-06 LAB
ALBUMIN SERPL BCP-MCNC: 3.3 G/DL (ref 3.5–5.2)
ALP SERPL-CCNC: 90 U/L (ref 55–135)
ALT SERPL W/O P-5'-P-CCNC: 19 U/L (ref 10–44)
ANION GAP SERPL CALC-SCNC: 10 MMOL/L (ref 8–16)
AST SERPL-CCNC: 18 U/L (ref 10–40)
BASOPHILS # BLD AUTO: 0.01 K/UL (ref 0–0.2)
BASOPHILS NFR BLD: 0.1 % (ref 0–1.9)
BILIRUB SERPL-MCNC: 0.5 MG/DL (ref 0.1–1)
BUN SERPL-MCNC: 19 MG/DL (ref 6–20)
CALCIUM SERPL-MCNC: 9.1 MG/DL (ref 8.7–10.5)
CHLORIDE SERPL-SCNC: 100 MMOL/L (ref 95–110)
CO2 SERPL-SCNC: 28 MMOL/L (ref 23–29)
CREAT SERPL-MCNC: 1.1 MG/DL (ref 0.5–1.4)
DIFFERENTIAL METHOD: ABNORMAL
EOSINOPHIL # BLD AUTO: 0 K/UL (ref 0–0.5)
EOSINOPHIL NFR BLD: 0 % (ref 0–8)
ERYTHROCYTE [DISTWIDTH] IN BLOOD BY AUTOMATED COUNT: 16.7 % (ref 11.5–14.5)
EST. GFR  (AFRICAN AMERICAN): >60 ML/MIN/1.73 M^2
EST. GFR  (NON AFRICAN AMERICAN): >60 ML/MIN/1.73 M^2
GLUCOSE SERPL-MCNC: 92 MG/DL (ref 70–110)
HCT VFR BLD AUTO: 35.2 % (ref 40–54)
HGB BLD-MCNC: 11.1 G/DL (ref 14–18)
IMM GRANULOCYTES # BLD AUTO: 0.03 K/UL (ref 0–0.04)
IMM GRANULOCYTES NFR BLD AUTO: 0.4 % (ref 0–0.5)
LYMPHOCYTES # BLD AUTO: 1.2 K/UL (ref 1–4.8)
LYMPHOCYTES NFR BLD: 15.5 % (ref 18–48)
MCH RBC QN AUTO: 28 PG (ref 27–31)
MCHC RBC AUTO-ENTMCNC: 31.5 G/DL (ref 32–36)
MCV RBC AUTO: 89 FL (ref 82–98)
MONOCYTES # BLD AUTO: 0.9 K/UL (ref 0.3–1)
MONOCYTES NFR BLD: 12.2 % (ref 4–15)
NEUTROPHILS # BLD AUTO: 5.6 K/UL (ref 1.8–7.7)
NEUTROPHILS NFR BLD: 71.8 % (ref 38–73)
NRBC BLD-RTO: 0 /100 WBC
PLATELET # BLD AUTO: 273 K/UL (ref 150–350)
PMV BLD AUTO: 10.8 FL (ref 9.2–12.9)
POTASSIUM SERPL-SCNC: 3.9 MMOL/L (ref 3.5–5.1)
PROT SERPL-MCNC: 6.9 G/DL (ref 6–8.4)
RBC # BLD AUTO: 3.97 M/UL (ref 4.6–6.2)
SODIUM SERPL-SCNC: 138 MMOL/L (ref 136–145)
WBC # BLD AUTO: 7.73 K/UL (ref 3.9–12.7)

## 2020-06-06 PROCEDURE — 94761 N-INVAS EAR/PLS OXIMETRY MLT: CPT

## 2020-06-06 PROCEDURE — 25000003 PHARM REV CODE 250: Performed by: INTERNAL MEDICINE

## 2020-06-06 PROCEDURE — S4991 NICOTINE PATCH NONLEGEND: HCPCS | Performed by: INTERNAL MEDICINE

## 2020-06-06 PROCEDURE — G0378 HOSPITAL OBSERVATION PER HR: HCPCS

## 2020-06-06 PROCEDURE — 99226 PR SUBSEQUENT OBSERVATION CARE,LEVEL III: ICD-10-PCS | Mod: ,,, | Performed by: INTERNAL MEDICINE

## 2020-06-06 PROCEDURE — 63600175 PHARM REV CODE 636 W HCPCS: Performed by: INTERNAL MEDICINE

## 2020-06-06 PROCEDURE — 80053 COMPREHEN METABOLIC PANEL: CPT

## 2020-06-06 PROCEDURE — 99226 PR SUBSEQUENT OBSERVATION CARE,LEVEL III: CPT | Mod: ,,, | Performed by: INTERNAL MEDICINE

## 2020-06-06 PROCEDURE — 94640 AIRWAY INHALATION TREATMENT: CPT

## 2020-06-06 PROCEDURE — 27000221 HC OXYGEN, UP TO 24 HOURS

## 2020-06-06 PROCEDURE — 85025 COMPLETE CBC W/AUTO DIFF WBC: CPT

## 2020-06-06 PROCEDURE — 25000242 PHARM REV CODE 250 ALT 637 W/ HCPCS: Performed by: HOSPITALIST

## 2020-06-06 PROCEDURE — 36415 COLL VENOUS BLD VENIPUNCTURE: CPT

## 2020-06-06 PROCEDURE — 25000003 PHARM REV CODE 250: Performed by: HOSPITALIST

## 2020-06-06 RX ORDER — FLUTICASONE PROPIONATE AND SALMETEROL XINAFOATE 45; 21 UG/1; UG/1
2 AEROSOL, METERED RESPIRATORY (INHALATION) EVERY 12 HOURS
Qty: 12 G | Refills: 1 | Status: SHIPPED | OUTPATIENT
Start: 2020-06-06 | End: 2021-04-04 | Stop reason: SDUPTHER

## 2020-06-06 RX ORDER — PREDNISONE 20 MG/1
40 TABLET ORAL DAILY
Qty: 4 TABLET | Refills: 0 | Status: SHIPPED | OUTPATIENT
Start: 2020-06-07 | End: 2020-06-06

## 2020-06-06 RX ORDER — ALBUTEROL SULFATE 90 UG/1
2 AEROSOL, METERED RESPIRATORY (INHALATION) EVERY 6 HOURS PRN
Qty: 8.5 G | Refills: 1 | Status: SHIPPED | OUTPATIENT
Start: 2020-06-06 | End: 2020-06-06 | Stop reason: SDUPTHER

## 2020-06-06 RX ORDER — ALBUTEROL SULFATE 90 UG/1
2 AEROSOL, METERED RESPIRATORY (INHALATION) EVERY 6 HOURS PRN
Qty: 8.5 G | Refills: 1 | Status: SHIPPED | OUTPATIENT
Start: 2020-06-06 | End: 2020-06-25 | Stop reason: SDUPTHER

## 2020-06-06 RX ORDER — TIOTROPIUM BROMIDE 18 UG/1
18 CAPSULE ORAL; RESPIRATORY (INHALATION) DAILY
Qty: 30 CAPSULE | Refills: 1 | OUTPATIENT
Start: 2020-06-06 | End: 2020-12-22

## 2020-06-06 RX ORDER — PREDNISONE 20 MG/1
40 TABLET ORAL DAILY
Qty: 4 TABLET | Refills: 0 | Status: SHIPPED | OUTPATIENT
Start: 2020-06-07 | End: 2020-06-09

## 2020-06-06 RX ADMIN — TIOTROPIUM BROMIDE 18 MCG: 18 CAPSULE ORAL; RESPIRATORY (INHALATION) at 09:06

## 2020-06-06 RX ADMIN — PREDNISONE 40 MG: 20 TABLET ORAL at 09:06

## 2020-06-06 RX ADMIN — ASPIRIN 81 MG: 81 TABLET, COATED ORAL at 09:06

## 2020-06-06 RX ADMIN — FLUTICASONE FUROATE AND VILANTEROL TRIFENATATE 1 PUFF: 200; 25 POWDER RESPIRATORY (INHALATION) at 09:06

## 2020-06-06 RX ADMIN — IPRATROPIUM BROMIDE AND ALBUTEROL SULFATE 3 ML: .5; 3 SOLUTION RESPIRATORY (INHALATION) at 10:06

## 2020-06-06 RX ADMIN — PANTOPRAZOLE SODIUM 40 MG: 40 TABLET, DELAYED RELEASE ORAL at 09:06

## 2020-06-06 RX ADMIN — ATORVASTATIN CALCIUM 40 MG: 40 TABLET, FILM COATED ORAL at 09:06

## 2020-06-06 RX ADMIN — METOPROLOL TARTRATE 25 MG: 25 TABLET, FILM COATED ORAL at 09:06

## 2020-06-06 RX ADMIN — NICOTINE 1 PATCH: 7 PATCH TRANSDERMAL at 09:06

## 2020-06-06 RX ADMIN — ACETAMINOPHEN 650 MG: 325 TABLET ORAL at 10:06

## 2020-06-06 RX ADMIN — HYDRALAZINE HYDROCHLORIDE AND ISOSORBIDE DINITRATE 1 TABLET: 37.5; 2 TABLET, FILM COATED ORAL at 09:06

## 2020-06-06 NOTE — PLAN OF CARE
06/06/2020      Baltazar Mccray  [address]  [address]  [facility] [address]          Hospital Medicine Dept.  [facility] [address] [facility] [id number] [phone] after hours  [phone] fax Baltazar Mccray has been hospitalized at the [facility] since 6/4/2020.  Please excuse the patient from duties.  Patient may return on 6/7/20.  No restrictions.     Please contact me if you have any questions.                  __________________________  Irene Montenegro MD  06/06/2020

## 2020-06-06 NOTE — PLAN OF CARE
Patient denies pain, no falls or signs of infection.  SOB with activity, PT will continue to work with patient.  VSS, A & O.

## 2020-06-06 NOTE — PLAN OF CARE
Pt was discharged today with all personal items and medications where delivered at bedside, his discharged paperwork was reviewed and a copy of it was given as well as a letter for work. His IV was discontinued, cath and tip was intact. Pt walked out of the floor to the elevator stating he wasn't going to wait for transport.

## 2020-06-07 NOTE — HOSPITAL COURSE
Mr. Mccray was admitted for worsening SOB likely due to COPD exacerbation vs progression of disease. Made 1.35L urine after lasix overnight. On day 2 pt back at his baseline 2L NC  with sats at goal. Denied a cough, fevers, chills, cp and stated sob had improved since admission. CM attempting to obtain portable oxygen unit for work, however, device is not covered by his insurance. PT/OT recommended IP rehab, but he refused. On day 3 pt feeling well and wanting to be discharged home, stating he needs to return to work ASAP. No wheezing on exam. Provided with script for pred 40mg to complete 5 days. Pt to follow-up with PCP and referral to pulm placed. Provided with scripts for oupt PT/OT.

## 2020-06-07 NOTE — HPI
59 yo M with PMHx COPD on 2L NC nightly, HTN, and CAD who presents to the ED complaining of worsening SOB x1 day. The patient reports that he has chronic SOB that has been ongoing for many months. The last couple of days have been exhausting for him however. He states that his boss sent him home from his work as a  and  because of SOB yesterday. Today, he tried to work again, and he was so exhausted and breathing so heavily that his boss called EMS. The patient denies any new specific symptoms such as cough, fevers, chills, chest pain, palpitations, or lightheadedness. He just reports that his chronic SOB seems to be worse. At baseline, the patietn reports that he wears 2L NC at home mainly at nights. He does not have a portable tank that he can take with him, however, and he does not wear O2 while at work.

## 2020-06-08 ENCOUNTER — TELEPHONE (OUTPATIENT)
Dept: HEPATOLOGY | Facility: HOSPITAL | Age: 60
End: 2020-06-08

## 2020-06-08 DIAGNOSIS — J44.1 COPD EXACERBATION: Primary | ICD-10-CM

## 2020-06-08 NOTE — PLAN OF CARE
06/08/20 0835   Final Note   Assessment Type Final Discharge Note     Patient discharged home with no needs on 6/6/2020. Discharge summary faxed to Dr. Patrick Celis (PCP at [facility]) f [phone].

## 2020-06-08 NOTE — DISCHARGE SUMMARY
[facility] Hospital Medicine  Discharge Summary      Patient Name: Baltazar Mccray  MRN: [id number]  Admission Date: 6/4/2020  Hospital Length of Stay: 0 days  Discharge Date and Time:  06/07/2020 8:07 PM  Attending Physician: No att. providers found   Discharging Provider: Irene Montenegro MD  Primary Care Provider: [facility] Medicine Team: [facility] HOSP MED R Irene Montenegro MD    HPI:      61 yo M with PMHx COPD on 2L NC nightly, HTN, and CAD who presents to the ED complaining of worsening SOB x1 day. The patient reports that he has chronic SOB that has been ongoing for many months. The last couple of days have been exhausting for him however. He states that his boss sent him home from his work as a  and  because of SOB yesterday. Today, he tried to work again, and he was so exhausted and breathing so heavily that his boss called EMS. The patient denies any new specific symptoms such as cough, fevers, chills, chest pain, palpitations, or lightheadedness. He just reports that his chronic SOB seems to be worse. At baseline, the patietn reports that he wears 2L NC at home mainly at nights. He does not have a portable tank that he can take with him, however, and he does not wear O2 while at work.    * No surgery found *      Hospital Course:   Mr. Mccray was admitted for worsening SOB likely due to COPD exacerbation vs progression of disease. Made 1.35L urine after lasix overnight. On day 2 pt back at his baseline 2L NC  with sats at goal. Denied a cough, fevers, chills, cp and stated sob had improved since admission. CM attempting to obtain portable oxygen unit for work, however, device is not covered by his insurance. PT/OT recommended IP rehab, but he refused. On day 3 pt feeling well and wanting to be discharged home, stating he needs to return to work ASAP. No wheezing on exam. Provided with script for pred 40mg to complete 5 days. Pt  to follow-up with PCP and referral to pulm placed. Provided with scripts for oupt PT/OT.     Consults:     No new Assessment & Plan notes have been filed under this hospital service since the last note was generated.  Service: Hospital Medicine    Final Active Diagnoses:    Diagnosis Date Noted POA    PRINCIPAL PROBLEM:  Acute on chronic respiratory failure with hypoxia and hypercapnia [J96.21, J96.22] 01/29/2020 Yes    Nonobstructive atherosclerosis of coronary artery [I25.10] 04/23/2020 Yes    Essential hypertension [I10] 05/19/2017 Yes    COPD exacerbation [J44.1] 05/14/2017 Yes      Problems Resolved During this Admission:       Discharged Condition: stable    Disposition: Home or Self Care    Follow Up:  Follow-up Information     Jaden On 6/11/2020.    Why:  at 12:15pm; PCP hospital follow up appointment with Dr. Patrick Celis; bring discharge paperwork & wear a mask to the appointment  Contact information:  [address]  [facility] [address]  [phone]                 Patient Instructions:      Ambulatory referral/consult to Pulmonology   Standing Status: Future   Referral Priority: Routine Referral Type: Consultation   Referral Reason: Specialty Services Required   Requested Specialty: Pulmonary Disease   Number of Visits Requested: 1     Ambulatory referral/consult to Pulmonary Rehab   Standing Status: Future   Referral Priority: Routine Referral Type: Consultation   Referral Reason: Specialty Services Required   Requested Specialty: Pulmonary Disease   Number of Visits Requested: 1     Ambulatory referral/consult to Physical/Occupational Therapy   Standing Status: Future   Referral Priority: Routine Referral Type: Physical Medicine   Referral Reason: Specialty Services Required   Requested Specialty: Occupational Therapy   Number of Visits Requested: 1     Ambulatory referral/consult to Physical/Occupational Therapy   Standing Status: Future   Referral Priority:  Routine Referral Type: Physical Medicine   Referral Reason: Specialty Services Required   Requested Specialty: Physical Therapy   Number of Visits Requested: 1     Diet Cardiac     Notify your health care provider if you experience any of the following:  difficulty breathing or increased cough     Notify your health care provider if you experience any of the following:  increased confusion or weakness     Activity as tolerated       Significant Diagnostic Studies: Labs: All labs within the past 24 hours have been reviewed    Pending Diagnostic Studies:     None         Medications:  Reconciled Home Medications:      Medication List      START taking these medications    albuterol 90 mcg/actuation inhaler  Commonly known as:  PROVENTIL HFA  Inhale 2 puffs into the lungs every 6 (six) hours as needed for Wheezing. Rescue     predniSONE 20 MG tablet  Commonly known as:  DELTASONE  Take 2 tablets (40 mg total) by mouth once daily. for 2 days        CONTINUE taking these medications    aspirin 81 MG EC tablet  Commonly known as:  ECOTRIN  Take 1 tablet (81 mg total) by mouth once daily.     atorvastatin 40 MG tablet  Commonly known as:  LIPITOR  Take 1 tablet (40 mg total) by mouth once daily.     BiDiL 20-37.5 mg Tab  Generic drug:  isosorbide-hydrALAZINE 20-37.5 mg  Take 1 tablet by mouth 2 (two) times daily.     fluticasone propion-salmeterol 45-21 mcg/dose 45-21 mcg/actuation Hfaa inhaler  Commonly known as:  ADVAIR HFA  Inhale 2 puffs into the lungs every 12 (twelve) hours. Controller     metoprolol tartrate 25 MG tablet  Commonly known as:  LOPRESSOR  Take 1 tablet (25 mg total) by mouth 2 (two) times daily.     omeprazole 20 MG capsule  Commonly known as:  PRILOSEC  Take 1 capsule (20 mg total) by mouth once daily.     senna 8.6 mg tablet  Commonly known as:  SENOKOT  Take 1 tablet by mouth daily as needed for Constipation.     SPIRIVA WITH HANDIHALER 18 mcg inhalation capsule  Generic drug:  tiotropium  Inhale 1  capsule (18 mcg total) into the lungs once daily. Controller        STOP taking these medications    ipratropium-albuteroL  mcg/actuation inhaler  Commonly known as:  COMBIVENT            Indwelling Lines/Drains at time of discharge:   Lines/Drains/Airways     None                 Time spent on the discharge of patient: 35 minutes  Patient was seen and examined on the date of discharge and determined to be suitable for discharge.         Irene Montenegro MD  Department of Hospital Medicine  [facility]

## 2020-06-11 ENCOUNTER — TELEPHONE (OUTPATIENT)
Dept: PULMONOLOGY | Facility: CLINIC | Age: 60
End: 2020-06-11

## 2020-06-11 NOTE — TELEPHONE ENCOUNTER
Called pt, unable to complete as dialed, called his sister and left message for him to call the Pulmonary clinic back, pt sister verbalized understanding

## 2020-06-15 ENCOUNTER — TELEPHONE (OUTPATIENT)
Dept: PULMONOLOGY | Facility: CLINIC | Age: 60
End: 2020-06-15

## 2020-06-15 NOTE — TELEPHONE ENCOUNTER
Pt does not have a valid phone number at this time, spoke to his sister again, she is getting him a phone and will see if she can get him to contact us to schedule him an appointment with a pulmonary provider

## 2020-06-25 ENCOUNTER — HOSPITAL ENCOUNTER (EMERGENCY)
Facility: HOSPITAL | Age: 60
Discharge: HOME | End: 2020-06-25
Attending: EMERGENCY MEDICINE
Payer: MEDICAID

## 2020-06-25 VITALS
WEIGHT: 129 LBS | OXYGEN SATURATION: 97 % | TEMPERATURE: 98 F | HEIGHT: 68 IN | DIASTOLIC BLOOD PRESSURE: 66 MMHG | BODY MASS INDEX: 19.55 KG/M2 | SYSTOLIC BLOOD PRESSURE: 119 MMHG | HEART RATE: 80 BPM | RESPIRATION RATE: 20 BRPM

## 2020-06-25 DIAGNOSIS — J44.1 COPD EXACERBATION: Primary | ICD-10-CM

## 2020-06-25 DIAGNOSIS — R06.02 SHORTNESS OF BREATH: ICD-10-CM

## 2020-06-25 LAB
ALBUMIN SERPL BCP-MCNC: 3.4 G/DL (ref 3.5–5.2)
ALLENS TEST: ABNORMAL
ALP SERPL-CCNC: 100 U/L (ref 55–135)
ALT SERPL W/O P-5'-P-CCNC: 39 U/L (ref 10–44)
ANION GAP SERPL CALC-SCNC: 14 MMOL/L (ref 8–16)
AST SERPL-CCNC: 59 U/L (ref 10–40)
BASOPHILS # BLD AUTO: 0.08 K/UL (ref 0–0.2)
BASOPHILS NFR BLD: 0.9 % (ref 0–1.9)
BILIRUB SERPL-MCNC: 0.3 MG/DL (ref 0.1–1)
BNP SERPL-MCNC: 197 PG/ML (ref 0–99)
BUN SERPL-MCNC: 14 MG/DL (ref 6–20)
CALCIUM SERPL-MCNC: 9.3 MG/DL (ref 8.7–10.5)
CHLORIDE SERPL-SCNC: 97 MMOL/L (ref 95–110)
CO2 SERPL-SCNC: 22 MMOL/L (ref 23–29)
CREAT SERPL-MCNC: 1.2 MG/DL (ref 0.5–1.4)
DIFFERENTIAL METHOD: ABNORMAL
EOSINOPHIL # BLD AUTO: 0 K/UL (ref 0–0.5)
EOSINOPHIL NFR BLD: 0.5 % (ref 0–8)
ERYTHROCYTE [DISTWIDTH] IN BLOOD BY AUTOMATED COUNT: 15.8 % (ref 11.5–14.5)
EST. GFR  (AFRICAN AMERICAN): >60 ML/MIN/1.73 M^2
EST. GFR  (NON AFRICAN AMERICAN): >60 ML/MIN/1.73 M^2
GLUCOSE SERPL-MCNC: 86 MG/DL (ref 70–110)
HCO3 UR-SCNC: 23.9 MMOL/L (ref 24–28)
HCT VFR BLD AUTO: 40.2 % (ref 40–54)
HGB BLD-MCNC: 12.8 G/DL (ref 14–18)
IMM GRANULOCYTES # BLD AUTO: 0.04 K/UL (ref 0–0.04)
IMM GRANULOCYTES NFR BLD AUTO: 0.5 % (ref 0–0.5)
LYMPHOCYTES # BLD AUTO: 2.5 K/UL (ref 1–4.8)
LYMPHOCYTES NFR BLD: 28.8 % (ref 18–48)
MCH RBC QN AUTO: 27.7 PG (ref 27–31)
MCHC RBC AUTO-ENTMCNC: 31.8 G/DL (ref 32–36)
MCV RBC AUTO: 87 FL (ref 82–98)
MONOCYTES # BLD AUTO: 0.7 K/UL (ref 0.3–1)
MONOCYTES NFR BLD: 8.3 % (ref 4–15)
NEUTROPHILS # BLD AUTO: 5.4 K/UL (ref 1.8–7.7)
NEUTROPHILS NFR BLD: 61 % (ref 38–73)
NRBC BLD-RTO: 0 /100 WBC
PCO2 BLDA: 43.5 MMHG (ref 35–45)
PH SMN: 7.35 [PH] (ref 7.35–7.45)
PLATELET # BLD AUTO: 355 K/UL (ref 150–350)
PMV BLD AUTO: 10.2 FL (ref 9.2–12.9)
PO2 BLDA: 28 MMHG (ref 40–60)
POC BE: -2 MMOL/L
POC SATURATED O2: 50 % (ref 95–100)
POC TCO2: 25 MMOL/L (ref 24–29)
POTASSIUM SERPL-SCNC: 4.1 MMOL/L (ref 3.5–5.1)
PROT SERPL-MCNC: 7.7 G/DL (ref 6–8.4)
RBC # BLD AUTO: 4.62 M/UL (ref 4.6–6.2)
SAMPLE: ABNORMAL
SARS-COV-2 RDRP RESP QL NAA+PROBE: NEGATIVE
SITE: ABNORMAL
SODIUM SERPL-SCNC: 133 MMOL/L (ref 136–145)
TROPONIN I SERPL DL<=0.01 NG/ML-MCNC: 0.03 NG/ML (ref 0–0.03)
TROPONIN I SERPL DL<=0.01 NG/ML-MCNC: 0.03 NG/ML (ref 0–0.03)
WBC # BLD AUTO: 8.78 K/UL (ref 3.9–12.7)

## 2020-06-25 PROCEDURE — 82803 BLOOD GASES ANY COMBINATION: CPT

## 2020-06-25 PROCEDURE — 85025 COMPLETE CBC W/AUTO DIFF WBC: CPT

## 2020-06-25 PROCEDURE — 83880 ASSAY OF NATRIURETIC PEPTIDE: CPT

## 2020-06-25 PROCEDURE — 80053 COMPREHEN METABOLIC PANEL: CPT

## 2020-06-25 PROCEDURE — 99285 EMERGENCY DEPT VISIT HI MDM: CPT | Mod: ,,, | Performed by: EMERGENCY MEDICINE

## 2020-06-25 PROCEDURE — 84484 ASSAY OF TROPONIN QUANT: CPT

## 2020-06-25 PROCEDURE — 94640 AIRWAY INHALATION TREATMENT: CPT

## 2020-06-25 PROCEDURE — 27000221 HC OXYGEN, UP TO 24 HOURS

## 2020-06-25 PROCEDURE — 93005 ELECTROCARDIOGRAM TRACING: CPT

## 2020-06-25 PROCEDURE — 99900035 HC TECH TIME PER 15 MIN (STAT)

## 2020-06-25 PROCEDURE — 84484 ASSAY OF TROPONIN QUANT: CPT | Mod: 91

## 2020-06-25 PROCEDURE — 99285 EMERGENCY DEPT VISIT HI MDM: CPT | Mod: 25

## 2020-06-25 PROCEDURE — 93010 ELECTROCARDIOGRAM REPORT: CPT | Mod: ,,, | Performed by: INTERNAL MEDICINE

## 2020-06-25 PROCEDURE — 25000242 PHARM REV CODE 250 ALT 637 W/ HCPCS

## 2020-06-25 PROCEDURE — 94761 N-INVAS EAR/PLS OXIMETRY MLT: CPT

## 2020-06-25 PROCEDURE — 63600175 PHARM REV CODE 636 W HCPCS: Performed by: EMERGENCY MEDICINE

## 2020-06-25 PROCEDURE — 96374 THER/PROPH/DIAG INJ IV PUSH: CPT

## 2020-06-25 PROCEDURE — 93010 EKG 12-LEAD: ICD-10-PCS | Mod: ,,, | Performed by: INTERNAL MEDICINE

## 2020-06-25 PROCEDURE — U0002 COVID-19 LAB TEST NON-CDC: HCPCS

## 2020-06-25 PROCEDURE — 99285 PR EMERGENCY DEPT VISIT,LEVEL V: ICD-10-PCS | Mod: ,,, | Performed by: EMERGENCY MEDICINE

## 2020-06-25 RX ORDER — IPRATROPIUM BROMIDE AND ALBUTEROL SULFATE 2.5; .5 MG/3ML; MG/3ML
SOLUTION RESPIRATORY (INHALATION)
Status: COMPLETED
Start: 2020-06-25 | End: 2020-06-25

## 2020-06-25 RX ORDER — ASPIRIN 325 MG
325 TABLET, DELAYED RELEASE (ENTERIC COATED) ORAL
Status: DISCONTINUED | OUTPATIENT
Start: 2020-06-25 | End: 2020-06-25

## 2020-06-25 RX ORDER — ALBUTEROL SULFATE 90 UG/1
2 AEROSOL, METERED RESPIRATORY (INHALATION) EVERY 6 HOURS PRN
Qty: 18 G | Refills: 12 | OUTPATIENT
Start: 2020-06-25 | End: 2020-12-22

## 2020-06-25 RX ORDER — PREDNISONE 20 MG/1
40 TABLET ORAL DAILY
Qty: 10 TABLET | Refills: 0 | Status: SHIPPED | OUTPATIENT
Start: 2020-06-25 | End: 2020-06-30

## 2020-06-25 RX ORDER — METHYLPREDNISOLONE SOD SUCC 125 MG
125 VIAL (EA) INJECTION
Status: COMPLETED | OUTPATIENT
Start: 2020-06-25 | End: 2020-06-25

## 2020-06-25 RX ORDER — AZITHROMYCIN 250 MG/1
250 TABLET, FILM COATED ORAL DAILY
Qty: 6 TABLET | Refills: 0 | Status: ON HOLD | OUTPATIENT
Start: 2020-06-25 | End: 2021-05-02 | Stop reason: HOSPADM

## 2020-06-25 RX ORDER — IPRATROPIUM BROMIDE AND ALBUTEROL SULFATE 2.5; .5 MG/3ML; MG/3ML
3 SOLUTION RESPIRATORY (INHALATION)
Status: COMPLETED | OUTPATIENT
Start: 2020-06-25 | End: 2020-06-25

## 2020-06-25 RX ADMIN — IPRATROPIUM BROMIDE AND ALBUTEROL SULFATE 3 ML: 2.5; .5 SOLUTION RESPIRATORY (INHALATION) at 01:06

## 2020-06-25 RX ADMIN — METHYLPREDNISOLONE SODIUM SUCCINATE 125 MG: 125 INJECTION, POWDER, FOR SOLUTION INTRAMUSCULAR; INTRAVENOUS at 01:06

## 2020-06-25 RX ADMIN — IPRATROPIUM BROMIDE AND ALBUTEROL SULFATE 3 ML: .5; 3 SOLUTION RESPIRATORY (INHALATION) at 01:06

## 2020-06-25 NOTE — ED NOTES
"ED  (SW) entered pt's room with permission. Pt lying down in bed watching television. Pt confirmed identifiers. Pt reports that he was at work when he a sudden SOB. He reports that he then caught the bus to the emergency room. Pt reports that his family is aware of his ED visit.     SW assessed and discussed social needs. Pt reports that he does not wear carry his oxygen tanks to work because they are too heavy. Pt reports that he works 4 days week 8 hours each work day. Pt reports that he does NOT have a portable oxygen concentrator. Pt reports that he is having difficulty working and he and his sister are looking for alternatives. Pt also report that he is having difficulty affording food. Pt has a PCP with a local [facility]. He reports that he has not seen the pulmonologist yet but would like to soon. Per chart reciew pulmonary services have attempted to make contact with pt unsuccessfully. He reports that his phone is broken but she be replaced by the end of the week and his number will be the same. Pt reports "let them know I'll have a phone soon but they can call my sister about an appointment."    SW provided pt with contact information to local food hernandez and resource information for his alternatives. SW will relay info to pulmonary services -Sherif Ahumada.    ED Physician notified. SW will follow up as needed.     - Marixa Medel, [facility]   [phone]     "

## 2020-06-25 NOTE — ED NOTES
Pt ambulatory to the restroom independently; steady gait noted.  Pt returned to room 23 without incident.  Pt replaced on continuous cardiac and pulse ox monitoring with 2 liters of supplemental oxygen via nasal cannula. Non-invasion blood pressure to cycle every 30 minutes. Bed locked in lowest position; side rails up and locked x 2; call light, bedside table, and personal belongings within reach. Pt instructed to alert nurse for assistance and before attempting to get out of bed; verbalizes understanding  Will continue to monitor pt.

## 2020-06-25 NOTE — ED NOTES
LOC: The patient is awake, alert, and oriented to place, time, situation. Affect is appropriate.  Speech is appropriate and clear.     APPEARANCE: Patient resting comfortably in no acute distress.  Patient is clean and well groomed.    SKIN: The skin is warm and dry; color consistent with ethnicity.  Patient has normal skin turgor and moist mucus membranes.  Skin intact; no breakdown or bruising noted.     MUSCULOSKELETAL: Patient moving upper and lower extremities without difficulty.  Denies weakness.     RESPIRATORY: Pt complains of SOB. Airway is open and patent. Respirations spontaneous, even though mildly labored with speaking.  Patient has a slightly increased effort and rate.  No accessory muscle use noted. Denies cough. Lungs sounds diminished though no wheezing is noted.    CARDIAC: Sinus tachycardia with a heart rate in the low 100's noted.  No peripheral edema noted. No complaints of chest pain.      ABDOMEN: Soft and non tender to palpation.  No distention noted.     NEUROLOGIC: Eyes open spontaneously.  Behavior appropriate to situation.  Follows commands; facial expression symmetrical.  Purposeful motor response noted; normal sensation in all extremities.

## 2020-06-25 NOTE — ED NOTES
Pt placed on continuous cardiac and pulse ox monitoring with 2 liters of supplemental oxygen via nasal cannula. Blood pressure to cycle every 30 minutes. Sinus tachycardia with a HR in the low 100's noted; VS otherwise stable.  Bed locked in lowest position; side rails up and locked x 2; call light, bedside table, and personal belongings within reach.  Pt instructed to alert nurse for assistance before attempting to get out of bed; verbalizes understanding; will continue to monitor pt.

## 2020-06-25 NOTE — ED TRIAGE NOTES
Pt to the ER with complaints of trouble breathing. Pt with a hx of COPD; states he is normally SOB but states it became much worse today while at work; pt denies fever or chills. Pt reports using his inhaler today with some relief; use oxygen as needed.

## 2020-06-25 NOTE — Clinical Note
Baltazar Mccray was seen and treated in our emergency department on 6/25/2020.  He may return to work on 06/26/2020.       If you have any questions or concerns, please don't hesitate to call.      Viktoriya Hui MD

## 2020-06-25 NOTE — ED NOTES
Pt resting quietly on stretcher; reporting significant relief in SOB since receiving breathing treatments. Pt denies needs or complaints at this time; will continue to monitor pt.

## 2020-06-25 NOTE — ED NOTES
Pt awake and alert; resting quietly on stretcher.  Pt remains on continuous cardiac and pulse ox monitoring with 2 liters of supplemental oxygen via nasal cannula.  Non-invasive blood pressure to cycle every 30 minutes. VS stable; NSR noted. Pt denies pain at this time; no acute distress or discomfort reported or observed.  Pt denies restroom needs at this time; is able to reposition self on stretcher. Bed locked in lowest position; side rails up and locked x 2; call light, bedside table, and personal belongings within reach. Room assessed for safety measures and cleanliness; no action needed at this time. Pt updated on wait for lab results.  Pt instructed to alert nurse for assistance and before attempting to get out of bed; verbalizes understanding. Pt denies needs or complaints at this time; will continue to monitor.

## 2020-06-25 NOTE — ED NOTES
Pt resting quietly on stretcher with eyes closed; opens eyes to verbal stimuli. Pt remains on continuous cardiac and pulse ox monitoring with 2 liters of supplemental oxygen via nasal cannula. Non-invasive blood pressure to cycle every 30 minutes. VS stable; NSR noted. Pt denies pain at this time; no acute distress or discomfort reported or observed.  Pt denies restroom needs at this time; is able to reposition self on stretcher. Bed locked in lowest position; side rails up and locked x 2; call light, bedside table, and personal belongings within reach. Room assessed for safety measures and cleanliness; no action needed at this time. Pt updated on plan for repeat Troponin; instructed to alert nurse for assistance and before attempting to get out of bed; verbalizes understanding. Pt denies needs or complaints at this time; will continue to monitor.

## 2020-06-25 NOTE — PROVIDER PROGRESS NOTES - EMERGENCY DEPT.
Emergency Department TeleTRIAGE Encounter Note      CHIEF COMPLAINT    Chief Complaint   Patient presents with    Shortness of Breath       VITAL SIGNS   Initial Vitals [06/25/20 1216]   BP Pulse Resp Temp SpO2   -- (!) 125 (!) 25 97.9 °F (36.6 °C) (!) 89 %      MAP       --            ALLERGIES    Review of patient's allergies indicates:   Allergen Reactions    Benazepril Swelling    Duoneb [ipratropium-albuterol]      Report Tremors       PROVIDER TRIAGE NOTE  Pt is a 60 y rold male with hx of HTN and COPD who presents to the ED with shortness of breath.  Pt states he woke up this morning and has not been able to catch his breath.  He states he has been coughing a little more than normal.  He reports taking his at home medications as prescribed.  Denies chest pain.  Denies fevers.  Pt is tachpyneic and hypoxic in triage.  Will start cardiac workup and give oxygen.      ORDERS  Labs Reviewed   CBC W/ AUTO DIFFERENTIAL   COMPREHENSIVE METABOLIC PANEL   B-TYPE NATRIURETIC PEPTIDE   TROPONIN I       ED Orders (720h ago, onward)    Start Ordered     Status Ordering Provider    06/25/20 1228 06/25/20 1227  Oxygen Continuous  Continuous      Ordered REMI CERVANTES    06/25/20 1227 06/25/20 1227  Saline lock IV  Once      Ordered REMI CERVANTES    06/25/20 1227 06/25/20 1227  CBC auto differential  STAT  Collect    Ordered REMI CERVANTES    06/25/20 1227 06/25/20 1227  Comprehensive metabolic panel  STAT  Collect    Ordered REMI CERVANTES    06/25/20 1227 06/25/20 1227  Brain natriuretic peptide  STAT  Collect    Ordered REMI CERVANTES    06/25/20 1227 06/25/20 1227  Troponin I  STAT  Collect    Ordered REMI CERVANTES    06/25/20 1227 06/25/20 1227  X-Ray Chest AP Portable  1 time imaging      Ordered REMI CERVANTES    06/25/20 1227 06/25/20 1227  EKG 12-lead  Once      Ordered REMI CERVANTES    06/25/20  1227 06/25/20 1227  Cardiac Monitoring - Adult  Continuous     Comments: Notify Physician If:    Ordered REMI CERVANTES    06/25/20 1227 06/25/20 1227  Pulse Oximetry Continuous  Continuous      Ordered REMI CERVANTES            Virtual Visit Note: The provider triage portion of this emergency department evaluation and documentation was performed via [employer], a HIPAA-compliant telemedicine application, in concert with a tele-presenter in the room. A face to face patient evaluation with one of my colleagues will occur once the patient is placed in an emergency department room.      DISCLAIMER: This note was prepared with [employer] voice recognition transcription software. Garbled syntax, mangled pronouns, and other bizarre constructions may be attributed to that software system.

## 2020-06-29 PROBLEM — Z09 HOSPITAL DISCHARGE FOLLOW-UP: Status: RESOLVED | Noted: 2020-03-29 | Resolved: 2020-06-29

## 2020-07-20 ENCOUNTER — HOSPITAL ENCOUNTER (EMERGENCY)
Facility: HOSPITAL | Age: 60
Discharge: HOME | End: 2020-07-20
Attending: EMERGENCY MEDICINE
Payer: MEDICAID

## 2020-07-20 VITALS
SYSTOLIC BLOOD PRESSURE: 175 MMHG | BODY MASS INDEX: 20.73 KG/M2 | DIASTOLIC BLOOD PRESSURE: 85 MMHG | HEART RATE: 77 BPM | TEMPERATURE: 99 F | WEIGHT: 129 LBS | RESPIRATION RATE: 26 BRPM | HEIGHT: 66 IN | OXYGEN SATURATION: 100 %

## 2020-07-20 DIAGNOSIS — I10 UNCONTROLLED HYPERTENSION: ICD-10-CM

## 2020-07-20 DIAGNOSIS — R91.1 NODULE OF UPPER LOBE OF LEFT LUNG: ICD-10-CM

## 2020-07-20 DIAGNOSIS — R06.02 SOB (SHORTNESS OF BREATH): Primary | ICD-10-CM

## 2020-07-20 DIAGNOSIS — R06.02 SHORTNESS OF BREATH: ICD-10-CM

## 2020-07-20 DIAGNOSIS — F17.200 SMOKING: ICD-10-CM

## 2020-07-20 DIAGNOSIS — Z87.09 HISTORY OF COPD: ICD-10-CM

## 2020-07-20 DIAGNOSIS — Z20.822 COVID-19 VIRUS NOT DETECTED: ICD-10-CM

## 2020-07-20 DIAGNOSIS — Z99.81 OXYGEN DEPENDENT: ICD-10-CM

## 2020-07-20 LAB
ALBUMIN SERPL BCP-MCNC: 3.6 G/DL (ref 3.5–5.2)
ALP SERPL-CCNC: 92 U/L (ref 55–135)
ALT SERPL W/O P-5'-P-CCNC: 37 U/L (ref 10–44)
ANION GAP SERPL CALC-SCNC: 11 MMOL/L (ref 8–16)
AST SERPL-CCNC: 50 U/L (ref 10–40)
BASOPHILS # BLD AUTO: 0.06 K/UL (ref 0–0.2)
BASOPHILS NFR BLD: 1.1 % (ref 0–1.9)
BILIRUB SERPL-MCNC: 1 MG/DL (ref 0.1–1)
BNP SERPL-MCNC: 68 PG/ML (ref 0–99)
BUN SERPL-MCNC: 5 MG/DL (ref 6–20)
CALCIUM SERPL-MCNC: 8.8 MG/DL (ref 8.7–10.5)
CHLORIDE SERPL-SCNC: 102 MMOL/L (ref 95–110)
CO2 SERPL-SCNC: 25 MMOL/L (ref 23–29)
CREAT SERPL-MCNC: 0.8 MG/DL (ref 0.5–1.4)
D DIMER PPP IA.FEU-MCNC: 0.7 MG/L FEU
DIFFERENTIAL METHOD: ABNORMAL
EOSINOPHIL # BLD AUTO: 0.3 K/UL (ref 0–0.5)
EOSINOPHIL NFR BLD: 4.6 % (ref 0–8)
ERYTHROCYTE [DISTWIDTH] IN BLOOD BY AUTOMATED COUNT: 15.9 % (ref 11.5–14.5)
EST. GFR  (AFRICAN AMERICAN): >60 ML/MIN/1.73 M^2
EST. GFR  (NON AFRICAN AMERICAN): >60 ML/MIN/1.73 M^2
GLUCOSE SERPL-MCNC: 75 MG/DL (ref 70–110)
HCT VFR BLD AUTO: 41.5 % (ref 40–54)
HGB BLD-MCNC: 13 G/DL (ref 14–18)
IMM GRANULOCYTES # BLD AUTO: 0.01 K/UL (ref 0–0.04)
IMM GRANULOCYTES NFR BLD AUTO: 0.2 % (ref 0–0.5)
LYMPHOCYTES # BLD AUTO: 2 K/UL (ref 1–4.8)
LYMPHOCYTES NFR BLD: 36 % (ref 18–48)
MCH RBC QN AUTO: 28.1 PG (ref 27–31)
MCHC RBC AUTO-ENTMCNC: 31.3 G/DL (ref 32–36)
MCV RBC AUTO: 90 FL (ref 82–98)
MONOCYTES # BLD AUTO: 0.6 K/UL (ref 0.3–1)
MONOCYTES NFR BLD: 11.8 % (ref 4–15)
NEUTROPHILS # BLD AUTO: 2.5 K/UL (ref 1.8–7.7)
NEUTROPHILS NFR BLD: 46.3 % (ref 38–73)
NRBC BLD-RTO: 0 /100 WBC
PLATELET # BLD AUTO: 223 K/UL (ref 150–350)
PMV BLD AUTO: 11.1 FL (ref 9.2–12.9)
POTASSIUM SERPL-SCNC: 4.3 MMOL/L (ref 3.5–5.1)
PROT SERPL-MCNC: 7.5 G/DL (ref 6–8.4)
RBC # BLD AUTO: 4.62 M/UL (ref 4.6–6.2)
SARS-COV-2 RDRP RESP QL NAA+PROBE: NEGATIVE
SODIUM SERPL-SCNC: 138 MMOL/L (ref 136–145)
TROPONIN I SERPL DL<=0.01 NG/ML-MCNC: 0.03 NG/ML (ref 0–0.03)
TROPONIN I SERPL DL<=0.01 NG/ML-MCNC: 0.04 NG/ML (ref 0–0.03)
WBC # BLD AUTO: 5.42 K/UL (ref 3.9–12.7)

## 2020-07-20 PROCEDURE — 94640 AIRWAY INHALATION TREATMENT: CPT

## 2020-07-20 PROCEDURE — U0002 COVID-19 LAB TEST NON-CDC: HCPCS

## 2020-07-20 PROCEDURE — 99285 EMERGENCY DEPT VISIT HI MDM: CPT | Mod: ,,, | Performed by: EMERGENCY MEDICINE

## 2020-07-20 PROCEDURE — 63600175 PHARM REV CODE 636 W HCPCS: Performed by: STUDENT IN AN ORGANIZED HEALTH CARE EDUCATION/TRAINING PROGRAM

## 2020-07-20 PROCEDURE — 96374 THER/PROPH/DIAG INJ IV PUSH: CPT | Mod: 59

## 2020-07-20 PROCEDURE — 85025 COMPLETE CBC W/AUTO DIFF WBC: CPT

## 2020-07-20 PROCEDURE — 83880 ASSAY OF NATRIURETIC PEPTIDE: CPT

## 2020-07-20 PROCEDURE — 80053 COMPREHEN METABOLIC PANEL: CPT

## 2020-07-20 PROCEDURE — 93005 ELECTROCARDIOGRAM TRACING: CPT

## 2020-07-20 PROCEDURE — 93010 EKG 12-LEAD: ICD-10-PCS | Mod: ,,, | Performed by: INTERNAL MEDICINE

## 2020-07-20 PROCEDURE — 99900035 HC TECH TIME PER 15 MIN (STAT)

## 2020-07-20 PROCEDURE — 84484 ASSAY OF TROPONIN QUANT: CPT

## 2020-07-20 PROCEDURE — 99285 PR EMERGENCY DEPT VISIT,LEVEL V: ICD-10-PCS | Mod: ,,, | Performed by: EMERGENCY MEDICINE

## 2020-07-20 PROCEDURE — 93010 ELECTROCARDIOGRAM REPORT: CPT | Mod: ,,, | Performed by: INTERNAL MEDICINE

## 2020-07-20 PROCEDURE — 85379 FIBRIN DEGRADATION QUANT: CPT

## 2020-07-20 PROCEDURE — 25500020 PHARM REV CODE 255: Performed by: EMERGENCY MEDICINE

## 2020-07-20 PROCEDURE — 94761 N-INVAS EAR/PLS OXIMETRY MLT: CPT

## 2020-07-20 PROCEDURE — 99285 EMERGENCY DEPT VISIT HI MDM: CPT | Mod: 25

## 2020-07-20 PROCEDURE — 25000242 PHARM REV CODE 250 ALT 637 W/ HCPCS: Performed by: STUDENT IN AN ORGANIZED HEALTH CARE EDUCATION/TRAINING PROGRAM

## 2020-07-20 RX ORDER — FUROSEMIDE 10 MG/ML
20 INJECTION INTRAMUSCULAR; INTRAVENOUS
Status: COMPLETED | OUTPATIENT
Start: 2020-07-20 | End: 2020-07-20

## 2020-07-20 RX ORDER — IPRATROPIUM BROMIDE AND ALBUTEROL SULFATE 2.5; .5 MG/3ML; MG/3ML
3 SOLUTION RESPIRATORY (INHALATION)
Status: COMPLETED | OUTPATIENT
Start: 2020-07-20 | End: 2020-07-20

## 2020-07-20 RX ORDER — AMLODIPINE BESYLATE 5 MG/1
10 TABLET ORAL DAILY
Qty: 30 TABLET | Refills: 0 | OUTPATIENT
Start: 2020-07-20 | End: 2020-12-22

## 2020-07-20 RX ADMIN — FUROSEMIDE 20 MG: 10 INJECTION, SOLUTION INTRAMUSCULAR; INTRAVENOUS at 09:07

## 2020-07-20 RX ADMIN — IPRATROPIUM BROMIDE AND ALBUTEROL SULFATE 3 ML: .5; 3 SOLUTION RESPIRATORY (INHALATION) at 10:07

## 2020-07-20 RX ADMIN — IOHEXOL 75 ML: 350 INJECTION, SOLUTION INTRAVENOUS at 12:07

## 2020-07-20 NOTE — ED NOTES
Pt resting comfortably and independently repositioned in stretcher with bed locked in lowest position for safety. NAD noted at this time. Respirations even and unlabored and visible chest rise noted.  Patient offered bathroom assistance and provided with a urinal after lasix injection. Pt instructed to call if assistance is needed. Pt on continuous cardiac, BP, and O2 monitoring. Call light within reach. No needs at this time. Will continue to monitor.

## 2020-07-20 NOTE — ED NOTES
Bedside report received. Pt stable and comfortable. No new needs at this time. Will continue to monitor.

## 2020-07-20 NOTE — ED NOTES
Pt ambulated 6 minutes with 2L O2 NC. Maintained oxygenation 91-98% throughout ambulation. Dr. Trejo aware.

## 2020-07-20 NOTE — ED NOTES
Pt ambulated on room air for 2 minutes, became increasingly SOB and O2 dropped to 85%. Pt returned to bed and placed back on 2L NC. Will continue to monitor.

## 2020-07-20 NOTE — ED PROVIDER NOTES
Encounter Date: 7/20/2020       History     Chief Complaint   Patient presents with    Shortness of Breath     copd     61 yo oxygen dependent (2L) gentleman with PMH COPD, HTN, CAD s/p [facility] April 2020 presenting to the ED for shortness of breath. He has had ongoing, worsening dyspnea for the past few months. Today the dyspnea woke him from sleep and the pt got scared and came to the ED. Despite dyspnea on exertion, he denies any chest pain on exertion, chest pain at rest, decreased exercise tolerance, lower extremity swelling or history of PE/DVT.  Symptoms are exacerbated with activity.  Typically his albuterol inhaler provides temporary relief of his symptoms. He uses the albuterol 3-4 times per day. COPD managed by PCP. No fevers, chills, hemoptysis, pleuritic chest pain, palpitations, and leg swelling. He does endorse a stable, chronic dry cough unchanged from his baseline.     The history is provided by the patient.     Review of patient's allergies indicates:   Allergen Reactions    Benazepril Swelling    Duoneb [ipratropium-albuterol]      Report Tremors     Past Medical History:   Diagnosis Date    Asthma     COPD (chronic obstructive pulmonary disease)     home O2 at night only    Coronary artery disease     Hypertension     Pneumonia     Seizures      Past Surgical History:   Procedure Laterality Date    LEFT HEART CATHETERIZATION  4/23/2020    Procedure: Left heart cath;  Surgeon: Nic Pollack MD;  Location: [facility] CATH LAB;  Service: Cardiology;;     Family History   Problem Relation Age of Onset    Cancer Father     Hypertension Sister     Stroke Sister     Stroke Brother     Diabetes Neg Hx     Heart disease Neg Hx      Social History     Tobacco Use    Smoking status: Current Every Day Smoker     Packs/day: 0.25     Types: Cigarettes    Smokeless tobacco: Never Used    Tobacco comment: 1-2 cigs per day   Substance Use Topics    Alcohol use: Yes     Alcohol/week: 2.0 standard  drinks     Types: 2 Cans of beer per week     Comment: every night    Drug use: No     Review of Systems   Constitutional: Negative for chills, fatigue and fever.   HENT: Negative for congestion and sinus pain.    Eyes: Negative for visual disturbance.   Respiratory: Positive for cough and shortness of breath. Negative for chest tightness and stridor.    Cardiovascular: Negative for chest pain, palpitations and leg swelling.   Gastrointestinal: Positive for abdominal pain. Negative for abdominal distention, diarrhea, nausea and vomiting.   Genitourinary: Negative for flank pain and hematuria.   Musculoskeletal: Negative for back pain and neck pain.   Skin: Negative for pallor.   Allergic/Immunologic: Negative for immunocompromised state.   Neurological: Negative for speech difficulty and headaches.   Hematological: Does not bruise/bleed easily.   Psychiatric/Behavioral: Negative for agitation.       Physical Exam     Initial Vitals [07/20/20 0815]   BP Pulse Resp Temp SpO2   (!) 173/96 92 (!) 24 98.3 °F (36.8 °C) (!) 92 %      MAP       --         Vitals:    07/20/20 1433 07/20/20 1502 07/20/20 1528 07/20/20 1529   BP:    (!) 175/85   BP Location:       Patient Position:       Pulse: 106 66 69 77   Resp: (!) 24 16 18 17   Temp:   98.9 °F (37.2 °C)    TempSrc:   Oral    SpO2: (!) 92% 100%  99%   Weight:       Height:             Physical Exam    Nursing note and vitals reviewed.  Constitutional: He appears well-developed and well-nourished. No distress.   HENT:   Head: Normocephalic and atraumatic.   Mouth/Throat: No oropharyngeal exudate.   Eyes: Conjunctivae and EOM are normal. No scleral icterus.   Neck: No JVD present.   Cardiovascular: Normal rate, regular rhythm and normal heart sounds. Exam reveals no friction rub.    No murmur heard.  Pulmonary/Chest: No stridor. No respiratory distress. He has no wheezes. He exhibits no tenderness.   Bibasilar course breath sounds   Musculoskeletal: No edema.    Neurological: He is alert and oriented to person, place, and time.   Skin: Skin is warm and dry. No erythema.   Psychiatric: He has a normal mood and affect.         ED Course   Procedures  Labs Reviewed   CBC W/ AUTO DIFFERENTIAL - Abnormal; Notable for the following components:       Result Value    Hemoglobin 13.0 (*)     Mean Corpuscular Hemoglobin Conc 31.3 (*)     RDW 15.9 (*)     All other components within normal limits   COMPREHENSIVE METABOLIC PANEL - Abnormal; Notable for the following components:    BUN, Bld 5 (*)     AST 50 (*)     All other components within normal limits   TROPONIN I - Abnormal; Notable for the following components:    Troponin I 0.040 (*)     All other components within normal limits   D DIMER, QUANTITATIVE - Abnormal; Notable for the following components:    D-Dimer 0.70 (*)     All other components within normal limits    Narrative:     ADD-ON DDIMR #[id number] PER MATEUS RUSHING MD 12:44  07/20/2020    B-TYPE NATRIURETIC PEPTIDE   SARS-COV-2 RNA AMPLIFICATION, QUAL   TROPONIN I   D DIMER, QUANTITATIVE     EKG Readings: (Independently Interpreted)   Normal sinus rhythm, rate around 80bpm, normal intervals, normal axis, no ischemic findings. Stable from previous 6/25/2020     ECG Results          EKG 12-lead (In process)  Result time 07/20/20 11:42:27    In process by Interface, Lab In [facility] (07/20/20 11:42:27)                 Narrative:    Test Reason : R06.02,    Vent. Rate : 081 BPM     Atrial Rate : 081 BPM     P-R Int : 172 ms          QRS Dur : 074 ms      QT Int : 394 ms       P-R-T Axes : 079 083 079 degrees     QTc Int : 457 ms    Normal sinus rhythm  Biatrial enlargement  Septal infarct (cited on or before 25-JUN-2020)  T wave abnormality, consider anterior ischemia  Abnormal ECG  When compared with ECG of 25-JUN-2020 13:54,  Questionable change in initial forces of Anterior leads    Referred By: [email or web address]   SELF           Confirmed By:                          "    Imaging Results          CTA Chest Non-Coronary (PE Study) (Final result)  Result time 07/20/20 13:30:16    Final result by Jacki Lin MD (07/20/20 13:30:16)                 Impression:      1.  No pulmonary thromboembolism or pulmonary infarct.    2.  Filling defect again noted in the posterior aspect of the right portion of the intrathoracic trachea at the level of the aortic arch with a similar filling defect on 03/15/2020 in this patient who had no filling defect at this location or elsewhere on 01/31/2020.  Consider re-evaluation of the airway after the patient performs 5 brisk coughs into tissues before lying down on the CT table, and possibly with the patient in prone position to resolve for any gravity dependent secretions.    3.  Pulmonary opacities:    A. new bandlike opacity is present in the posterior aspect of the apicoposterior segment of the left upper lobe, 0.9 x 0.5 cm.    B. chronic focal soft tissue opacity in the posterior basal segment of the left lower lobe appears similar to multiple prior studies dating back to 05/14/2017.    In light of the new bandlike opacity in the posterior aspect of the apicoposterior segment of the left upper lobe and the patient's history of "current every day smoker" I recommend repeat chest CT, preferably without intravenous contrast medium, after an interval of 3 months (November 2020).  I also recommend that the patient undergo annual CT pulmonary assessment in light of his history of open "current everyday smoker".      Electronically signed by: aJcki Lin MD  Date:    07/20/2020  Time:    13:30             Narrative:    EXAMINATION:  CTA CHEST NON CORONARY    CLINICAL HISTORY:  PE suspected, intermediate prob, neg D-dimer;    TECHNIQUE:  During intravenous bolus injection of Omnipaque 350 contrast medium and using low dose technique, the chest was surveyed from above the pulmonary apices through the posterior costophrenic angles.  Images were " acquired without respiratory or cardiac gating.  Quantity of contrast medium administered is not available to me at the time of dictation.    Data was reconstructed for multiplanar images in axial, sagittal and coronal planes and for maximal intensity projection images in the axial plane.  Image spatial and contrast resolution is limited by the patient's motion during image acquisition.    All CT scans at this facility use dose modulation, iterative reconstruction and/or weight based dosing when appropriate to reduce radiation dose to as low as reasonably achievable.    Xray dose: DLP = 233.60 mGy-cm.    COMPARISON:  Chest radiograph: 07/20/2020.    CTA chest: 03/15/2020.    FINDINGS:  Base of Neck: No significant abnormality.    Aorta: Left-sided aortic arch with 3 arterial branches.  The aorta maintains normal caliber, contour and course. There is calcific plaque in the thoracic aorta and its branches including the coronary arteries..    Heart/pericardium: Normal size.  No pericardial effusion or calcification.    Pulmonary arteries: Pulmonary arteries distribute normally.  Pulmonary arteries are brightly opacified.  There no pulmonary thromboembolism or other filling defect.  There is  no pulmonary infarct; please see comments below regarding the soft tissue nodular opacity observed in the posterior basal segment of the left lower lobe on current and multiple prior chest CTs..    Pulmonary veins, left atrium:    There are four pulmonary veins that return to the left atrium.    Beti/Mediastinum: No pathologic matti enlargement.    Pleura: No pleural fluid.No pleural calcification.  No pneumothorax.    Esophagus: Normal.    Upper Abdomen: No abnormality of the partially imaged upper abdomen.    Thoracic soft tissues: Normal. Both breasts are present.    Bones: No acute fracture. No suspicious lytic or sclerotic lesion.    Airways:    -Filling defect again noted in the posterior aspect of the right portion of the  "intrathoracic trachea at the level of the aortic arch with a similar filling defect on 03/15/2020 in this patient who had no filling defect at this location or elsewhere on 01/31/2020.  Consider re-evaluation of the airway after the patient performs 5 brisk coughs into tissues before lying down on the CT table, and possibly with the patient in prone position to resolve for any gravity dependent secretions.    -More peripherally the airways appear widely patent.    Lungs:    -Bandlike opacity in the posterior aspect of the apicoposterior segment of the left upper lobe abuts the pleural margin (axial series 3, image 29), not present on 03/15/2020.  It measures 0.9 x 0.5 cm and has nonspecific character. I recommend repeat chest CT, preferably without intravenous contrast medium, after an interval of 3 months (November 2020), in this patient described as "current every day smoker" in the electronic medical record.    -Focal soft tissue opacity in the posterior basal segment of the left lower lobe broadly abuts the pleural margin (07/20/2020 axial series 3, image 74; 03/15/2020 axial series 3, image 360).   (02/04/2018 axial series 3, image 172; 01/31/2020 axial series 4, image 360; 02/04/2018 axial series 3, image 172; 05/14/2017 axial series 4, image 68).  On axial series 3, image 74 today it measures 0.9 x 0.9 cm with similar measurements on earlier studies.  The lesion has bandlike opacities emanating from it.  Although it does not have convincing features for rounded atelectasis, chronicity over a period of 3 years argues for benign etiology.  However in light of the patient's history of open "current every day smoker" is indicated in the electronic medical record, consider CT surveillance or low-dose screening CT on an annual basis.    -Also in the posterior basal segment of the left lower lobe there are tiny scattered opacities suggesting aspiration or peripheral bronchial/bronchiolar inflammation.  These are new " since earlier studies.    -No new pulmonary disease identified.  No dev cardiac decompensation identified in this 60-year-old man with coronary artery atherosclerosis.                               X-Ray Chest PA And Lateral (Final result)  Result time 07/20/20 09:52:33    Final result by Naeem Choi MD (07/20/20 09:52:33)                 Impression:      See above      Electronically signed by: Naeem Choi MD  Date:    07/20/2020  Time:    09:52             Narrative:    EXAMINATION:  XR CHEST PA AND LATERAL    CLINICAL HISTORY:  Shortness of breath    TECHNIQUE:  PA and lateral views of the chest were performed.    COMPARISON:  No 06/25/2020 ne    FINDINGS:  Heart size normal.  Nodular densities at the lung bases bilaterally probably the deeper shadows.  No significant airspace consolidation or pleural effusion.  Few fibrotic changes noted adjacent to the right hilum.                              X-Rays:   Independently Interpreted Readings:   Chest X-Ray: Normal heart size.  No infiltrates.  No acute abnormalities.     Medical Decision Making:   History:   Old Medical Records: I decided to obtain old medical records.  Old Records Summarized: records from clinic visits and other records.       <> Summary of Records: Patient was recently seen in ED 6/25/2020 for similar problem, determined to be COPD exacerbation, sent home with short steroid course and azithromycin.   Initial Assessment:   59 yo male with SOB with PMH of COPD, CAD, and uncontrolled HTN. My differential diagnosis includes but is not limited to COPD worsening, congestive heart failure, pneumonia, pleural effusion, COVID, and less likely pulmonary embolus. Lasix 20 IV, duo-nebs given. CMP, CBC, BNP, CXR, rapid covid screen, troponin, TTE ordered.  EKG normal sinus without ischemic changes.   Clinical Tests:   Lab Tests: Ordered and Reviewed  The following lab test(s) were unremarkable: CBC, CMP and BNP       <> Summary of Lab: Troponin mildly  elevated 0.04.   Radiological Study: Ordered and Reviewed  Medical Tests: Reviewed    Additional MDM:     Well's Criteria Score:  -Clinical symptoms of DVT (leg swelling, pain with palpation) = 0.0  -Other diagnosis less likely than pulmonary embolism =            0.0  -Heart Rate >100 =   0.0  -Immobilization (= or > than 3 days) or surgery in the previous 4 weeks = 0.0  -Previous DVT/PE = 0.0  -Hemoptysis =          0.0  -Malignancy =           0.0  Well's Probability Score =    0          APC / Resident Notes:   10:57 AM  Patient appears comfortable s/p duo-nebs and 20 lasix. No shortness of breath at rest. Lungs with mild course breath sounds L>R. Will obtain repeat troponin in the early afternoon.   2:00 PM  Repeat troponin WNL. D-dimer elevated, CT Chest without pulmonary embolus and with new left apical opacity. Patient able to ambulate on 2 L nc while maintaining oxygen saturations above 92%.            Attending Attestation:   Physician Attestation Statement for Resident:  As the supervising MD   Physician Attestation Statement: I have personally seen and examined this patient.   I agree with the above history. -:   As the supervising MD I agree with the above PE.    As the supervising MD I agree with the above treatment, course, plan, and disposition.            Attending ED Notes:   STAFF ATTENDING PHYSICIAN NOTE:  I have individually/jointly evaluated Baltazar Mccray and discussed their ED management with the resident physician. I have also reviewed their notes, assessments, and procedures documented.  I was present during all critical portions of any procedure(s) performed on Baltazar Mccray.   ____________________  Rashaad Trejo MD, [facility]  Emergency Medicine Staff  8:21 PM 7/20/2020                          Clinical Impression:       ICD-10-CM ICD-9-CM   1. SOB (shortness of breath)  R06.02 786.05   2. Shortness of breath  R06.02 786.05   3. Covid-19 Virus not Detected  [id number]    4. Nodule of upper lobe of  left lung  R91.1 793.11   5. Smoking  F17.200 305.1   6. History of COPD  Z87.09 V12.69   7. Oxygen dependent  Z99.81 V46.2   8. Uncontrolled hypertension  I10 401.9         Disposition:   Disposition: Discharged  Condition: Stable     ED Disposition Condition    Discharge Stable        ED Prescriptions     Medication Sig Dispense Start Date End Date Auth. Provider    amLODIPine (NORVASC) 5 MG tablet Take 2 tablets (10 mg total) by mouth once daily. for 15 days 30 tablet 7/20/2020 8/4/2020 Cesar Trejo MD        Follow-up Information     Follow up With Specialties Details Why Contact Info    [facility] Emergency Medicine  If symptoms worsen [facility] [facility] [id number] [phone]                                     Keyonna Contreras MD  Resident  07/20/20 1515       Cesar Trejo MD  07/20/20 2023

## 2020-07-20 NOTE — Clinical Note
"Baltazar"Scooby Mccray was seen and treated in our emergency department on 7/20/2020.     COVID-19 is present in our communities across the state. There is limited testing for COVID at this time, so not all patients can be tested. In this situation, your employee meets the following criteria:    Baltazar Mccray has met the criteria for COVID-19 testing and has a NEGATIVE result. The employee can return to work once they are asymptomatic for 72 hours without the use of fever reducing medications (Tylenol, Motrin, etc).     If you have any questions or concerns, or if I can be of further assistance, please do not hesitate to contact me.    Sincerely,             KAMILA Amezcua RN"

## 2020-07-20 NOTE — ED TRIAGE NOTES
Baltazar Mccray, a 60 y.o. male presents to the ED w/ complaint of increased SOB starting this morning.  Pt has a hx of COPD and denies being on home O2.  Pt denies fever/chills, diarrhea, or change in taste.     Triage note:  Chief Complaint   Patient presents with    Shortness of Breath     copd     Review of patient's allergies indicates:   Allergen Reactions    Benazepril Swelling    Duoneb [ipratropium-albuterol]      Report Tremors     Past Medical History:   Diagnosis Date    Asthma     COPD (chronic obstructive pulmonary disease)     home O2 at night only    Coronary artery disease     Hypertension     Pneumonia     Seizures      Patient identifiers verified and correct for Baltazar Mccray.    LOC: The patient is awake, alert and aware of environment with an appropriate affect, the patient is oriented x 3 and speaking appropriately.  APPEARANCE: Patient resting comfortably and in no acute distress, patient is clean and well groomed, patient's clothing is properly fastened.  SKIN: The skin is warm and dry, color consistent with ethnicity, patient has normal skin turgor and moist mucus membranes, skin intact, no breakdown or bruising noted.  MUSCULOSKELETAL: Patient moving all extremities spontaneously, no obvious swelling or deformities noted.  + generalized weakness  RESPIRATORY: Airway is open and patent, respirations are spontaneous, patient has a normal effort and rate, no accessory muscle use noted, increased SOB and REES.  CARDIAC: Patient has a normal rate and regular rhythm, no periphreal edema noted, capillary refill < 3 seconds.  ABDOMEN: Soft and non tender to palpation, no distention noted, normoactive bowel sounds present in all four quadrants.  NEUROLOGIC: eyes open spontaneously, behavior appropriate to situation, follows commands, facial expression symmetrical, bilateral hand grasp equal and even, purposeful motor response noted, normal sensation in all extremities when touched with a  finger.

## 2020-07-20 NOTE — DISCHARGE INSTRUCTIONS
"   CTA Chest Non-Coronary (PE Study) (Final result)  Result time 07/20/20 13:30:16  Final result by Jacki Lin MD (07/20/20 13:30:16)                Impression:      1.  No pulmonary thromboembolism or pulmonary infarct.     2.  Filling defect again noted in the posterior aspect of the right portion of the intrathoracic trachea at the level of the aortic arch with a similar filling defect on 03/15/2020 in this patient who had no filling defect at this location or elsewhere on 01/31/2020.  Consider re-evaluation of the airway after the patient performs 5 brisk coughs into tissues before lying down on the CT table, and possibly with the patient in prone position to resolve for any gravity dependent secretions.     3.  Pulmonary opacities:     A. new bandlike opacity is present in the posterior aspect of the apicoposterior segment of the left upper lobe, 0.9 x 0.5 cm.     B. chronic focal soft tissue opacity in the posterior basal segment of the left lower lobe appears similar to multiple prior studies dating back to 05/14/2017.     In light of the new bandlike opacity in the posterior aspect of the apicoposterior segment of the left upper lobe and the patient's history of "current every day smoker" I recommend repeat chest CT, preferably without intravenous contrast medium, after an interval of 3 months (November 2020).  I also recommend that the patient undergo annual CT pulmonary assessment in light of his history of open "current everyday smoker".       Electronically signed by: Jacki Lin MD   Date: 07/20/2020   Time: 13:30            Narrative:    EXAMINATION:   CTA CHEST NON CORONARY     CLINICAL HISTORY:   PE suspected, intermediate prob, neg D-dimer;     TECHNIQUE:   During intravenous bolus injection of Omnipaque 350 contrast medium and using low dose technique, the chest was surveyed from above the pulmonary apices through the posterior costophrenic angles.  Images were acquired without " respiratory or cardiac gating.  Quantity of contrast medium administered is not available to me at the time of dictation.     Data was reconstructed for multiplanar images in axial, sagittal and coronal planes and for maximal intensity projection images in the axial plane.  Image spatial and contrast resolution is limited by the patient's motion during image acquisition.     All CT scans at this facility use dose modulation, iterative reconstruction and/or weight based dosing when appropriate to reduce radiation dose to as low as reasonably achievable.     Xray dose: DLP = 233.60 mGy-cm.     COMPARISON:   Chest radiograph: 07/20/2020.     CTA chest: 03/15/2020.     FINDINGS:   Base of Neck: No significant abnormality.     Aorta: Left-sided aortic arch with 3 arterial branches.  The aorta maintains normal caliber, contour and course. There is calcific plaque in the thoracic aorta and its branches including the coronary arteries..     Heart/pericardium: Normal size.  No pericardial effusion or calcification.     Pulmonary arteries: Pulmonary arteries distribute normally.  Pulmonary arteries are brightly opacified.  There no pulmonary thromboembolism or other filling defect.  There is  no pulmonary infarct; please see comments below regarding the soft tissue nodular opacity observed in the posterior basal segment of the left lower lobe on current and multiple prior chest CTs..     Pulmonary veins, left atrium:     There are four pulmonary veins that return to the left atrium.     Beti/Mediastinum: No pathologic matti enlargement.     Pleura: No pleural fluid.No pleural calcification.  No pneumothorax.     Esophagus: Normal.     Upper Abdomen: No abnormality of the partially imaged upper abdomen.     Thoracic soft tissues: Normal. Both breasts are present.     Bones: No acute fracture. No suspicious lytic or sclerotic lesion.     Airways:     -Filling defect again noted in the posterior aspect of the right portion of  "the intrathoracic trachea at the level of the aortic arch with a similar filling defect on 03/15/2020 in this patient who had no filling defect at this location or elsewhere on 01/31/2020.  Consider re-evaluation of the airway after the patient performs 5 brisk coughs into tissues before lying down on the CT table, and possibly with the patient in prone position to resolve for any gravity dependent secretions.     -More peripherally the airways appear widely patent.     Lungs:     -Bandlike opacity in the posterior aspect of the apicoposterior segment of the left upper lobe abuts the pleural margin (axial series 3, image 29), not present on 03/15/2020.  It measures 0.9 x 0.5 cm and has nonspecific character. I recommend repeat chest CT, preferably without intravenous contrast medium, after an interval of 3 months (November 2020), in this patient described as "current every day smoker" in the electronic medical record.     -Focal soft tissue opacity in the posterior basal segment of the left lower lobe broadly abuts the pleural margin (07/20/2020 axial series 3, image 74; 03/15/2020 axial series 3, image 360).   (02/04/2018 axial series 3, image 172; 01/31/2020 axial series 4, image 360; 02/04/2018 axial series 3, image 172; 05/14/2017 axial series 4, image 68).  On axial series 3, image 74 today it measures 0.9 x 0.9 cm with similar measurements on earlier studies.  The lesion has bandlike opacities emanating from it.  Although it does not have convincing features for rounded atelectasis, chronicity over a period of 3 years argues for benign etiology.  However in light of the patient's history of open "current every day smoker" is indicated in the electronic medical record, consider CT surveillance or low-dose screening CT on an annual basis.     -Also in the posterior basal segment of the left lower lobe there are tiny scattered opacities suggesting aspiration or peripheral bronchial/bronchiolar inflammation.  " These are new since earlier studies.     -No new pulmonary disease identified.  No dev cardiac decompensation identified in this 60-year-old man with coronary artery atherosclerosis.                     X-Ray Chest PA And Lateral (Final result)  Result time 07/20/20 09:52:33  Final result by Naeem Choi MD (07/20/20 09:52:33)                Impression:      See above       Electronically signed by: Naeem Choi MD   Date: 07/20/2020   Time: 09:52            Narrative:    EXAMINATION:   XR CHEST PA AND LATERAL     CLINICAL HISTORY:   Shortness of breath     TECHNIQUE:   PA and lateral views of the chest were performed.     COMPARISON:   No 06/25/2020 ne     FINDINGS:   Heart size normal.  Nodular densities at the lung bases bilaterally probably the deeper shadows.  No significant airspace consolidation or pleural effusion.  Few fibrotic changes noted adjacent to the right hilum.

## 2020-08-14 ENCOUNTER — TELEPHONE (OUTPATIENT)
Dept: PULMONOLOGY | Facility: CLINIC | Age: 60
End: 2020-08-14

## 2020-08-14 NOTE — TELEPHONE ENCOUNTER
Left message on patient voicemail, informing him that I'm contacting him in regards to scheduling his appointment with one of our providers. I advised patient to contact the office. Office number has been provided.

## 2020-08-18 ENCOUNTER — TELEPHONE (OUTPATIENT)
Dept: PULMONOLOGY | Facility: CLINIC | Age: 60
End: 2020-08-18

## 2020-08-18 NOTE — TELEPHONE ENCOUNTER
I called and left a message for Mr Mccray about coming for a appointment to followup on his abnormal CT scan. This patient has never been seen in this clinic but has many Emergency Room visits. Deana Quintero LPN

## 2020-08-24 ENCOUNTER — TELEPHONE (OUTPATIENT)
Dept: PULMONOLOGY | Facility: CLINIC | Age: 60
End: 2020-08-24

## 2020-08-24 NOTE — TELEPHONE ENCOUNTER
LVM for patient to return my call. I was calling to schedule an appointment from referral placed for pulmonary.

## 2020-08-25 ENCOUNTER — TELEPHONE (OUTPATIENT)
Dept: PULMONOLOGY | Facility: CLINIC | Age: 60
End: 2020-08-25

## 2020-08-25 NOTE — TELEPHONE ENCOUNTER
I called and left a message for Mr Mccray about a visit in Pulmonary clinic. I left my name and telephone number for him to jaimee me back. Christy Quintero LPN

## 2020-08-26 ENCOUNTER — TELEPHONE (OUTPATIENT)
Dept: PULMONOLOGY | Facility: CLINIC | Age: 60
End: 2020-08-26

## 2020-08-27 ENCOUNTER — TELEPHONE (OUTPATIENT)
Dept: PULMONOLOGY | Facility: CLINIC | Age: 60
End: 2020-08-27

## 2020-09-04 ENCOUNTER — TELEPHONE (OUTPATIENT)
Dept: PULMONOLOGY | Facility: CLINIC | Age: 60
End: 2020-09-04

## 2020-09-04 NOTE — TELEPHONE ENCOUNTER
Left message on  Patient voicemail, informing him that I'm contacting him in regards to scheduling him a appointment with one of our providers. I advised patient that if he wants to schedule appointment or if he has any questions or concerns, he may contact the office. Office number has been provided.

## 2020-09-08 ENCOUNTER — TELEPHONE (OUTPATIENT)
Dept: PULMONOLOGY | Facility: CLINIC | Age: 60
End: 2020-09-08

## 2020-12-22 ENCOUNTER — HOSPITAL ENCOUNTER (EMERGENCY)
Facility: HOSPITAL | Age: 60
Discharge: HOME | End: 2020-12-22
Attending: EMERGENCY MEDICINE
Payer: MEDICAID

## 2020-12-22 VITALS
DIASTOLIC BLOOD PRESSURE: 108 MMHG | RESPIRATION RATE: 16 BRPM | HEIGHT: 65 IN | BODY MASS INDEX: 21.83 KG/M2 | HEART RATE: 78 BPM | WEIGHT: 131 LBS | OXYGEN SATURATION: 94 % | TEMPERATURE: 98 F | SYSTOLIC BLOOD PRESSURE: 202 MMHG

## 2020-12-22 DIAGNOSIS — Z76.0 MEDICATION REFILL: Primary | ICD-10-CM

## 2020-12-22 DIAGNOSIS — J44.1 COPD EXACERBATION: ICD-10-CM

## 2020-12-22 LAB
CTP QC/QA: YES
SARS-COV-2 RDRP RESP QL NAA+PROBE: NEGATIVE

## 2020-12-22 PROCEDURE — 99284 EMERGENCY DEPT VISIT MOD MDM: CPT

## 2020-12-22 PROCEDURE — 99284 PR EMERGENCY DEPT VISIT,LEVEL IV: ICD-10-PCS | Mod: CS,,, | Performed by: EMERGENCY MEDICINE

## 2020-12-22 PROCEDURE — U0002 COVID-19 LAB TEST NON-CDC: HCPCS | Performed by: EMERGENCY MEDICINE

## 2020-12-22 PROCEDURE — 99284 EMERGENCY DEPT VISIT MOD MDM: CPT | Mod: CS,,, | Performed by: EMERGENCY MEDICINE

## 2020-12-22 RX ORDER — AMLODIPINE BESYLATE 5 MG/1
10 TABLET ORAL DAILY
Qty: 60 TABLET | Refills: 2 | Status: ON HOLD | OUTPATIENT
Start: 2020-12-22 | End: 2021-05-02 | Stop reason: HOSPADM

## 2020-12-22 RX ORDER — ISOSORBIDE DINITRATE AND HYDRALAZINE HYDROCHLORIDE 37.5; 2 MG/1; MG/1
1 TABLET ORAL 2 TIMES DAILY
Qty: 60 TABLET | Refills: 2 | Status: ON HOLD | OUTPATIENT
Start: 2020-12-22 | End: 2021-05-02 | Stop reason: HOSPADM

## 2020-12-22 RX ORDER — METOPROLOL TARTRATE 25 MG/1
25 TABLET, FILM COATED ORAL 2 TIMES DAILY
Qty: 60 TABLET | Refills: 2 | Status: SHIPPED | OUTPATIENT
Start: 2020-12-22 | End: 2020-12-22 | Stop reason: SDUPTHER

## 2020-12-22 RX ORDER — ALBUTEROL SULFATE 90 UG/1
2 AEROSOL, METERED RESPIRATORY (INHALATION) EVERY 6 HOURS PRN
Qty: 18 G | Refills: 12 | Status: SHIPPED | OUTPATIENT
Start: 2020-12-22 | End: 2020-12-22 | Stop reason: SDUPTHER

## 2020-12-22 RX ORDER — ALBUTEROL SULFATE 90 UG/1
2 AEROSOL, METERED RESPIRATORY (INHALATION) EVERY 6 HOURS PRN
Qty: 18 G | Refills: 12 | OUTPATIENT
Start: 2020-12-22 | End: 2021-02-12

## 2020-12-22 RX ORDER — ISOSORBIDE DINITRATE AND HYDRALAZINE HYDROCHLORIDE 37.5; 2 MG/1; MG/1
1 TABLET ORAL 2 TIMES DAILY
Qty: 60 TABLET | Refills: 2 | Status: SHIPPED | OUTPATIENT
Start: 2020-12-22 | End: 2020-12-22 | Stop reason: SDUPTHER

## 2020-12-22 RX ORDER — TIOTROPIUM BROMIDE 18 UG/1
18 CAPSULE ORAL; RESPIRATORY (INHALATION) DAILY
Qty: 30 CAPSULE | Refills: 1 | Status: SHIPPED | OUTPATIENT
Start: 2020-12-22 | End: 2021-04-27 | Stop reason: ALTCHOICE

## 2020-12-22 RX ORDER — METOPROLOL TARTRATE 25 MG/1
25 TABLET, FILM COATED ORAL 2 TIMES DAILY
Qty: 60 TABLET | Refills: 2 | Status: ON HOLD | OUTPATIENT
Start: 2020-12-22 | End: 2021-05-02 | Stop reason: HOSPADM

## 2020-12-22 RX ORDER — TIOTROPIUM BROMIDE 18 UG/1
18 CAPSULE ORAL; RESPIRATORY (INHALATION) DAILY
Qty: 30 CAPSULE | Refills: 1 | Status: SHIPPED | OUTPATIENT
Start: 2020-12-22 | End: 2020-12-22 | Stop reason: SDUPTHER

## 2020-12-22 RX ORDER — AMLODIPINE BESYLATE 5 MG/1
10 TABLET ORAL DAILY
Qty: 60 TABLET | Refills: 2 | Status: SHIPPED | OUTPATIENT
Start: 2020-12-22 | End: 2020-12-22 | Stop reason: SDUPTHER

## 2020-12-22 NOTE — ED NOTES
"Pt stated "no one took my blood pressure today, I don't believe that number." Pt blood pressure rechecked.   "

## 2020-12-22 NOTE — ED PROVIDER NOTES
. Encounter Date: 12/22/2020    SCRIBE #1 NOTE: I, Saira Laws, am scribing for, and in the presence of,  Dr. Aguirre. I have scribed the entire note.       History     Chief Complaint   Patient presents with    Shortness of Breath     Pt with COPD states that he is out of his inhaler and needs prescription.       Mr. Mccray is a 60 year old male with a past medical history of COPD, CAD, HTN, and pneumonia, who presents to the ED with a chief complaint shortness of breath. Patient reports that he has shortness of breath while working, but not currently. He presents today because he is out of refills on his HTN medications, albuterol inhaler, and nebulizer. He denies wheezing, cough, nausea, vomiting, leg swelling, or chest pain today. No longer smoking cigarettes.     The history is provided by medical records and the patient.     Review of patient's allergies indicates:   Allergen Reactions    Benazepril Swelling    Duoneb [ipratropium-albuterol]      Report Tremors     Past Medical History:   Diagnosis Date    Asthma     COPD (chronic obstructive pulmonary disease)     home O2 at night only    Coronary artery disease     Hypertension     Pneumonia     Seizures      Past Surgical History:   Procedure Laterality Date    LEFT HEART CATHETERIZATION  4/23/2020    Procedure: Left heart cath;  Surgeon: Nic Pollack MD;  Location: [facility] CATH LAB;  Service: Cardiology;;     Family History   Problem Relation Age of Onset    Cancer Father     Hypertension Sister     Stroke Sister     Stroke Brother     Diabetes Neg Hx     Heart disease Neg Hx      Social History     Tobacco Use    Smoking status: Current Every Day Smoker     Packs/day: 0.25     Types: Cigarettes    Smokeless tobacco: Never Used    Tobacco comment: 1-2 cigs per day   Substance Use Topics    Alcohol use: Yes     Alcohol/week: 2.0 standard drinks     Types: 2 Cans of beer per week     Comment: every night    Drug use: No      Review of Systems   Constitutional: Negative for chills and fever.   HENT: Negative for congestion and sore throat.    Respiratory: Positive for shortness of breath (very mild). Negative for cough and wheezing.    Cardiovascular: Negative for chest pain and leg swelling.   Gastrointestinal: Negative for nausea and vomiting.   Genitourinary: Negative for dysuria and flank pain.   Musculoskeletal: Negative for back pain and myalgias.   Skin: Negative for rash.   Neurological: Negative for dizziness and weakness.   Hematological: Does not bruise/bleed easily.       Physical Exam     Initial Vitals [12/22/20 1045]   BP Pulse Resp Temp SpO2   (!) 213/92 78 16 98 °F (36.7 °C) (!) 94 %      MAP       --         Physical Exam    Nursing note and vitals reviewed.  Constitutional: He appears well-developed and well-nourished. He is not diaphoretic. No distress.   HENT:   Head: Normocephalic and atraumatic.   Mouth/Throat: Oropharynx is clear and moist.   Neck: Normal range of motion. Neck supple. No JVD present.   Cardiovascular: Normal rate, regular rhythm, normal heart sounds and intact distal pulses.   Pulmonary/Chest: No tachypnea. No respiratory distress. He has wheezes (faint, expiratory). He has no rhonchi. He has no rales.   Speaking in clear, complete sentences.   Abdominal: Soft. He exhibits no distension. There is no abdominal tenderness.   Musculoskeletal: Normal range of motion. No tenderness or edema.   Lymphadenopathy:     He has no cervical adenopathy.   Neurological: He is alert and oriented to person, place, and time. He has normal strength. No cranial nerve deficit or sensory deficit.   Skin: Skin is warm and dry.         ED Course   Procedures  Labs Reviewed   SARS-COV-2 RDRP GENE          Imaging Results    None          Medical Decision Making:   History:   Old Medical Records: I decided to obtain old medical records.  Initial Assessment:   Urgent evaluation 60-year-old male presenting for medication  refill.  He is hypertensive, O2 sat 94% ( baseline).  He denies severe shortness of breath but does report mild wheezing.  He is out of his albuterol and antihypertensive medications, it is requesting a refill.  Differential Diagnosis:   Medication refill, uncontrolled hypertension, uncontrolled COPD  Clinical Tests:   Lab Tests: Ordered and Reviewed  ED Management:  - COVID negative  - patient is well-known to me and appears well compared to his previous ER visits.  He has no new complaints at this time is just requesting medication refill.  Medication for sent to the pharmacy and patient was provided with paper prescription.  He stable for discharge            Scribe Attestation:   Scribe #1: I performed the above scribed service and the documentation accurately describes the services I performed. I attest to the accuracy of the note.    Attending Attestation:           Physician Attestation for Scribe:      Comments: I, Dr. Trena Aguirre, personally performed the services described in this documentation. All medical record entries made by the scribe were at my direction and in my presence.  I have reviewed the chart and agree that the record reflects my personal performance and is accurate and complete. Trena Aguirre MD.                        Clinical Impression:     ICD-10-CM ICD-9-CM   1. Medication refill  Z76.0 V68.1   2. COPD exacerbation  J44.1 491.21                      Disposition:   Disposition: Discharged  Condition: Stable     ED Disposition Condition    Discharge Stable        ED Prescriptions     Medication Sig Dispense Start Date End Date Auth. Provider    albuterol (PROVENTIL HFA) 90 mcg/actuation inhaler  (Status: Discontinued) Inhale 2 puffs into the lungs every 6 (six) hours as needed for Wheezing. Rescue 18 g 12/22/2020 12/22/2020 Trena Aguirre MD    amLODIPine (NORVASC) 5 MG tablet  (Status: Discontinued) Take 2 tablets (10 mg total) by mouth once daily. 60 tablet 12/22/2020 12/22/2020 Trena  MD Timothy    isosorbide-hydrALAZINE 20-37.5 mg (BIDIL) 20-37.5 mg Tab  (Status: Discontinued) Take 1 tablet by mouth 2 (two) times daily. 60 tablet 12/22/2020 12/22/2020 Trena Aguirre MD    metoprolol tartrate (LOPRESSOR) 25 MG tablet  (Status: Discontinued) Take 1 tablet (25 mg total) by mouth 2 (two) times daily. 60 tablet 12/22/2020 12/22/2020 Trena Aguirre MD    tiotropium (SPIRIVA) 18 mcg inhalation capsule  (Status: Discontinued) Inhale 1 capsule (18 mcg total) into the lungs once daily. Controller 30 capsule 12/22/2020 12/22/2020 Trena Aguirre MD    albuterol (PROVENTIL HFA) 90 mcg/actuation inhaler Inhale 2 puffs into the lungs every 6 (six) hours as needed for Wheezing. Rescue 18 g 12/22/2020 1/21/2021 Trena Aguirre MD    amLODIPine (NORVASC) 5 MG tablet Take 2 tablets (10 mg total) by mouth once daily. 60 tablet 12/22/2020 1/21/2021 Trena Aguirre MD    isosorbide-hydrALAZINE 20-37.5 mg (BIDIL) 20-37.5 mg Tab Take 1 tablet by mouth 2 (two) times daily. 60 tablet 12/22/2020 3/22/2021 Trena Aguirre MD    metoprolol tartrate (LOPRESSOR) 25 MG tablet Take 1 tablet (25 mg total) by mouth 2 (two) times daily. 60 tablet 12/22/2020 3/22/2021 Trena Aguirre MD    tiotropium (SPIRIVA) 18 mcg inhalation capsule Inhale 1 capsule (18 mcg total) into the lungs once daily. Controller 30 capsule 12/22/2020 3/22/2021 Trena Aguirre MD        Follow-up Information     Follow up With Specialties Details Why Contact Info Additional Information    [facility] Internal Medicine Schedule an appointment as soon as possible for a visit   [id number]  [facility] [address] [phone] [facility] Please park in surface lot and check in at central registration desk                                       Trena Aguirre MD  12/22/20 1421       Trena Aguirre MD  12/22/20 1428

## 2020-12-22 NOTE — Clinical Note
"Baltazar"Scooby Mccray was seen and treated in our emergency department on 12/22/2020.     COVID-19 is present in our communities across the state. There is limited testing for COVID at this time, so not all patients can be tested. In this situation, your employee meets the following criteria:    Baltazar Mccray has met the criteria for COVID-19 testing and has a NEGATIVE result. The employee can return to work once they are asymptomatic for 72 hours without the use of fever reducing medications (Tylenol, Motrin, etc).     If you have any questions or concerns, or if I can be of further assistance, please do not hesitate to contact me.    Sincerely,              RN"

## 2020-12-22 NOTE — ED TRIAGE NOTES
"Patient presents from home for prescription refills. Patient states, "I'm out of my inhalers and blood pressure medications". Patient reports he was feeling out of breath because he hasn't been able to use his inhalers at home. Patient is A&Ox4 and following commands.   "

## 2021-02-12 ENCOUNTER — HOSPITAL ENCOUNTER (EMERGENCY)
Facility: HOSPITAL | Age: 61
Discharge: HOME | End: 2021-02-12
Attending: EMERGENCY MEDICINE
Payer: MEDICAID

## 2021-02-12 VITALS
DIASTOLIC BLOOD PRESSURE: 89 MMHG | WEIGHT: 130 LBS | TEMPERATURE: 98 F | RESPIRATION RATE: 20 BRPM | OXYGEN SATURATION: 95 % | SYSTOLIC BLOOD PRESSURE: 149 MMHG | BODY MASS INDEX: 20.89 KG/M2 | HEART RATE: 73 BPM | HEIGHT: 66 IN

## 2021-02-12 DIAGNOSIS — J44.1 COPD EXACERBATION: Primary | ICD-10-CM

## 2021-02-12 DIAGNOSIS — R06.02 SHORTNESS OF BREATH: ICD-10-CM

## 2021-02-12 DIAGNOSIS — R06.02 SOB (SHORTNESS OF BREATH): ICD-10-CM

## 2021-02-12 LAB
ALBUMIN SERPL BCP-MCNC: 3.4 G/DL (ref 3.5–5.2)
ALP SERPL-CCNC: 77 U/L (ref 55–135)
ALT SERPL W/O P-5'-P-CCNC: 10 U/L (ref 10–44)
ANION GAP SERPL CALC-SCNC: 9 MMOL/L (ref 8–16)
AST SERPL-CCNC: 18 U/L (ref 10–40)
BASOPHILS # BLD AUTO: 0.07 K/UL (ref 0–0.2)
BASOPHILS NFR BLD: 1.4 % (ref 0–1.9)
BILIRUB SERPL-MCNC: 0.4 MG/DL (ref 0.1–1)
BNP SERPL-MCNC: 24 PG/ML (ref 0–99)
BUN SERPL-MCNC: 12 MG/DL (ref 6–20)
CALCIUM SERPL-MCNC: 8.8 MG/DL (ref 8.7–10.5)
CHLORIDE SERPL-SCNC: 101 MMOL/L (ref 95–110)
CO2 SERPL-SCNC: 30 MMOL/L (ref 23–29)
CREAT SERPL-MCNC: 1 MG/DL (ref 0.5–1.4)
CTP QC/QA: YES
DIFFERENTIAL METHOD: ABNORMAL
EOSINOPHIL # BLD AUTO: 0.2 K/UL (ref 0–0.5)
EOSINOPHIL NFR BLD: 3.4 % (ref 0–8)
ERYTHROCYTE [DISTWIDTH] IN BLOOD BY AUTOMATED COUNT: 16.1 % (ref 11.5–14.5)
EST. GFR  (AFRICAN AMERICAN): >60 ML/MIN/1.73 M^2
EST. GFR  (NON AFRICAN AMERICAN): >60 ML/MIN/1.73 M^2
GLUCOSE SERPL-MCNC: 68 MG/DL (ref 70–110)
HCT VFR BLD AUTO: 44.4 % (ref 40–54)
HCV AB SERPL QL IA: NEGATIVE
HGB BLD-MCNC: 13.6 G/DL (ref 14–18)
HIV 1+2 AB+HIV1 P24 AG SERPL QL IA: NEGATIVE
IMM GRANULOCYTES # BLD AUTO: 0.01 K/UL (ref 0–0.04)
IMM GRANULOCYTES NFR BLD AUTO: 0.2 % (ref 0–0.5)
LYMPHOCYTES # BLD AUTO: 2 K/UL (ref 1–4.8)
LYMPHOCYTES NFR BLD: 38.7 % (ref 18–48)
MAGNESIUM SERPL-MCNC: 2 MG/DL (ref 1.6–2.6)
MCH RBC QN AUTO: 24.9 PG (ref 27–31)
MCHC RBC AUTO-ENTMCNC: 30.6 G/DL (ref 32–36)
MCV RBC AUTO: 81 FL (ref 82–98)
MONOCYTES # BLD AUTO: 0.7 K/UL (ref 0.3–1)
MONOCYTES NFR BLD: 13.7 % (ref 4–15)
NEUTROPHILS # BLD AUTO: 2.2 K/UL (ref 1.8–7.7)
NEUTROPHILS NFR BLD: 42.6 % (ref 38–73)
NRBC BLD-RTO: 0 /100 WBC
PLATELET # BLD AUTO: 320 K/UL (ref 150–350)
PMV BLD AUTO: 10 FL (ref 9.2–12.9)
POTASSIUM SERPL-SCNC: 4.5 MMOL/L (ref 3.5–5.1)
PROT SERPL-MCNC: 7.5 G/DL (ref 6–8.4)
RBC # BLD AUTO: 5.47 M/UL (ref 4.6–6.2)
SARS-COV-2 RDRP RESP QL NAA+PROBE: NEGATIVE
SODIUM SERPL-SCNC: 140 MMOL/L (ref 136–145)
TROPONIN I SERPL DL<=0.01 NG/ML-MCNC: 0.01 NG/ML (ref 0–0.03)
WBC # BLD AUTO: 5.04 K/UL (ref 3.9–12.7)

## 2021-02-12 PROCEDURE — 86803 HEPATITIS C AB TEST: CPT

## 2021-02-12 PROCEDURE — 80053 COMPREHEN METABOLIC PANEL: CPT

## 2021-02-12 PROCEDURE — 84484 ASSAY OF TROPONIN QUANT: CPT

## 2021-02-12 PROCEDURE — 83735 ASSAY OF MAGNESIUM: CPT

## 2021-02-12 PROCEDURE — 99284 PR EMERGENCY DEPT VISIT,LEVEL IV: ICD-10-PCS | Mod: ,,, | Performed by: EMERGENCY MEDICINE

## 2021-02-12 PROCEDURE — 63600175 PHARM REV CODE 636 W HCPCS: Performed by: EMERGENCY MEDICINE

## 2021-02-12 PROCEDURE — U0002 COVID-19 LAB TEST NON-CDC: HCPCS | Performed by: EMERGENCY MEDICINE

## 2021-02-12 PROCEDURE — 83880 ASSAY OF NATRIURETIC PEPTIDE: CPT

## 2021-02-12 PROCEDURE — 25000242 PHARM REV CODE 250 ALT 637 W/ HCPCS: Performed by: EMERGENCY MEDICINE

## 2021-02-12 PROCEDURE — 94761 N-INVAS EAR/PLS OXIMETRY MLT: CPT

## 2021-02-12 PROCEDURE — 94640 AIRWAY INHALATION TREATMENT: CPT

## 2021-02-12 PROCEDURE — 99285 EMERGENCY DEPT VISIT HI MDM: CPT | Mod: 25

## 2021-02-12 PROCEDURE — 93010 ELECTROCARDIOGRAM REPORT: CPT | Mod: ,,, | Performed by: INTERNAL MEDICINE

## 2021-02-12 PROCEDURE — 27000221 HC OXYGEN, UP TO 24 HOURS

## 2021-02-12 PROCEDURE — 85025 COMPLETE CBC W/AUTO DIFF WBC: CPT

## 2021-02-12 PROCEDURE — 93005 ELECTROCARDIOGRAM TRACING: CPT

## 2021-02-12 PROCEDURE — 86703 HIV-1/HIV-2 1 RESULT ANTBDY: CPT

## 2021-02-12 PROCEDURE — 93010 EKG 12-LEAD: ICD-10-PCS | Mod: ,,, | Performed by: INTERNAL MEDICINE

## 2021-02-12 PROCEDURE — 99284 EMERGENCY DEPT VISIT MOD MDM: CPT | Mod: ,,, | Performed by: EMERGENCY MEDICINE

## 2021-02-12 RX ORDER — PREDNISONE 20 MG/1
60 TABLET ORAL
Status: COMPLETED | OUTPATIENT
Start: 2021-02-12 | End: 2021-02-12

## 2021-02-12 RX ORDER — ALBUTEROL SULFATE 90 UG/1
2 AEROSOL, METERED RESPIRATORY (INHALATION) EVERY 4 HOURS PRN
Qty: 6.7 G | Refills: 1 | Status: SHIPPED | OUTPATIENT
Start: 2021-02-12 | End: 2021-04-11 | Stop reason: SDUPTHER

## 2021-02-12 RX ORDER — ALBUTEROL SULFATE 2.5 MG/.5ML
5 SOLUTION RESPIRATORY (INHALATION)
Status: COMPLETED | OUTPATIENT
Start: 2021-02-12 | End: 2021-02-12

## 2021-02-12 RX ORDER — PREDNISONE 50 MG/1
50 TABLET ORAL EVERY MORNING
Qty: 4 TABLET | Refills: 0 | Status: SHIPPED | OUTPATIENT
Start: 2021-02-12 | End: 2021-03-28 | Stop reason: SDUPTHER

## 2021-02-12 RX ADMIN — PREDNISONE 60 MG: 20 TABLET ORAL at 08:02

## 2021-02-12 RX ADMIN — ALBUTEROL SULFATE 5 MG: 2.5 SOLUTION RESPIRATORY (INHALATION) at 08:02

## 2021-03-19 ENCOUNTER — HOSPITAL ENCOUNTER (EMERGENCY)
Facility: HOSPITAL | Age: 61
Discharge: HOME | End: 2021-03-19
Attending: EMERGENCY MEDICINE
Payer: MEDICAID

## 2021-03-19 VITALS
DIASTOLIC BLOOD PRESSURE: 73 MMHG | HEIGHT: 66 IN | WEIGHT: 160 LBS | TEMPERATURE: 99 F | SYSTOLIC BLOOD PRESSURE: 139 MMHG | OXYGEN SATURATION: 97 % | HEART RATE: 86 BPM | BODY MASS INDEX: 25.71 KG/M2 | RESPIRATION RATE: 18 BRPM

## 2021-03-19 DIAGNOSIS — R06.02 SOB (SHORTNESS OF BREATH): ICD-10-CM

## 2021-03-19 DIAGNOSIS — R06.02 SHORTNESS OF BREATH: ICD-10-CM

## 2021-03-19 DIAGNOSIS — J44.1 COPD EXACERBATION: Primary | ICD-10-CM

## 2021-03-19 LAB
CTP QC/QA: YES
SARS-COV-2 RDRP RESP QL NAA+PROBE: NEGATIVE

## 2021-03-19 PROCEDURE — 93010 EKG 12-LEAD: ICD-10-PCS | Mod: ,,, | Performed by: INTERNAL MEDICINE

## 2021-03-19 PROCEDURE — U0002 COVID-19 LAB TEST NON-CDC: HCPCS | Performed by: EMERGENCY MEDICINE

## 2021-03-19 PROCEDURE — 94640 AIRWAY INHALATION TREATMENT: CPT

## 2021-03-19 PROCEDURE — 99284 EMERGENCY DEPT VISIT MOD MDM: CPT | Mod: CS,,, | Performed by: EMERGENCY MEDICINE

## 2021-03-19 PROCEDURE — 99284 PR EMERGENCY DEPT VISIT,LEVEL IV: ICD-10-PCS | Mod: CS,,, | Performed by: EMERGENCY MEDICINE

## 2021-03-19 PROCEDURE — 93010 ELECTROCARDIOGRAM REPORT: CPT | Mod: ,,, | Performed by: INTERNAL MEDICINE

## 2021-03-19 PROCEDURE — 25000242 PHARM REV CODE 250 ALT 637 W/ HCPCS: Performed by: EMERGENCY MEDICINE

## 2021-03-19 PROCEDURE — 63600175 PHARM REV CODE 636 W HCPCS: Performed by: EMERGENCY MEDICINE

## 2021-03-19 PROCEDURE — 99285 EMERGENCY DEPT VISIT HI MDM: CPT | Mod: 25

## 2021-03-19 PROCEDURE — 93005 ELECTROCARDIOGRAM TRACING: CPT

## 2021-03-19 RX ORDER — ALBUTEROL SULFATE 2.5 MG/.5ML
2.5 SOLUTION RESPIRATORY (INHALATION)
Status: COMPLETED | OUTPATIENT
Start: 2021-03-19 | End: 2021-03-19

## 2021-03-19 RX ORDER — PREDNISONE 20 MG/1
60 TABLET ORAL
Status: COMPLETED | OUTPATIENT
Start: 2021-03-19 | End: 2021-03-19

## 2021-03-19 RX ORDER — ALBUTEROL SULFATE 90 UG/1
1-2 AEROSOL, METERED RESPIRATORY (INHALATION) EVERY 4 HOURS PRN
Qty: 18 G | Refills: 1 | Status: ON HOLD | OUTPATIENT
Start: 2021-03-19 | End: 2021-05-23 | Stop reason: SDUPTHER

## 2021-03-19 RX ORDER — BENZONATATE 100 MG/1
100 CAPSULE ORAL 3 TIMES DAILY PRN
Qty: 20 CAPSULE | Refills: 0 | Status: SHIPPED | OUTPATIENT
Start: 2021-03-19 | End: 2021-03-29

## 2021-03-19 RX ORDER — PREDNISONE 20 MG/1
60 TABLET ORAL DAILY
Qty: 12 TABLET | Refills: 0 | Status: SHIPPED | OUTPATIENT
Start: 2021-03-19 | End: 2021-03-23

## 2021-03-19 RX ADMIN — ALBUTEROL SULFATE 2.5 MG: 2.5 SOLUTION RESPIRATORY (INHALATION) at 06:03

## 2021-03-19 RX ADMIN — PREDNISONE 60 MG: 20 TABLET ORAL at 05:03

## 2021-03-19 RX ADMIN — ALBUTEROL SULFATE 2.5 MG: 2.5 SOLUTION RESPIRATORY (INHALATION) at 05:03

## 2021-03-28 ENCOUNTER — HOSPITAL ENCOUNTER (EMERGENCY)
Facility: HOSPITAL | Age: 61
Discharge: HOME | End: 2021-03-28
Attending: EMERGENCY MEDICINE
Payer: MEDICAID

## 2021-03-28 VITALS
RESPIRATION RATE: 19 BRPM | TEMPERATURE: 98 F | DIASTOLIC BLOOD PRESSURE: 71 MMHG | WEIGHT: 140 LBS | OXYGEN SATURATION: 95 % | BODY MASS INDEX: 22.5 KG/M2 | HEART RATE: 68 BPM | SYSTOLIC BLOOD PRESSURE: 126 MMHG | HEIGHT: 66 IN

## 2021-03-28 DIAGNOSIS — R06.02 SHORTNESS OF BREATH: ICD-10-CM

## 2021-03-28 DIAGNOSIS — J44.1 COPD EXACERBATION: Primary | ICD-10-CM

## 2021-03-28 LAB
ALBUMIN SERPL BCP-MCNC: 3.4 G/DL (ref 3.5–5.2)
ALP SERPL-CCNC: 90 U/L (ref 55–135)
ALT SERPL W/O P-5'-P-CCNC: 10 U/L (ref 10–44)
ANION GAP SERPL CALC-SCNC: 12 MMOL/L (ref 8–16)
AST SERPL-CCNC: 18 U/L (ref 10–40)
BASOPHILS # BLD AUTO: 0.01 K/UL (ref 0–0.2)
BASOPHILS NFR BLD: 0.2 % (ref 0–1.9)
BILIRUB SERPL-MCNC: 0.4 MG/DL (ref 0.1–1)
BNP SERPL-MCNC: 97 PG/ML (ref 0–99)
BUN SERPL-MCNC: 15 MG/DL (ref 6–20)
CALCIUM SERPL-MCNC: 8.1 MG/DL (ref 8.7–10.5)
CHLORIDE SERPL-SCNC: 100 MMOL/L (ref 95–110)
CO2 SERPL-SCNC: 27 MMOL/L (ref 23–29)
CREAT SERPL-MCNC: 1.1 MG/DL (ref 0.5–1.4)
CTP QC/QA: YES
DIFFERENTIAL METHOD: ABNORMAL
EOSINOPHIL # BLD AUTO: 0 K/UL (ref 0–0.5)
EOSINOPHIL NFR BLD: 0 % (ref 0–8)
ERYTHROCYTE [DISTWIDTH] IN BLOOD BY AUTOMATED COUNT: 18.6 % (ref 11.5–14.5)
EST. GFR  (AFRICAN AMERICAN): >60 ML/MIN/1.73 M^2
EST. GFR  (NON AFRICAN AMERICAN): >60 ML/MIN/1.73 M^2
GLUCOSE SERPL-MCNC: 124 MG/DL (ref 70–110)
HCT VFR BLD AUTO: 47.2 % (ref 40–54)
HGB BLD-MCNC: 14.9 G/DL (ref 14–18)
IMM GRANULOCYTES # BLD AUTO: 0.04 K/UL (ref 0–0.04)
IMM GRANULOCYTES NFR BLD AUTO: 0.7 % (ref 0–0.5)
LYMPHOCYTES # BLD AUTO: 0.5 K/UL (ref 1–4.8)
LYMPHOCYTES NFR BLD: 8.3 % (ref 18–48)
MCH RBC QN AUTO: 25.5 PG (ref 27–31)
MCHC RBC AUTO-ENTMCNC: 31.6 G/DL (ref 32–36)
MCV RBC AUTO: 81 FL (ref 82–98)
MONOCYTES # BLD AUTO: 0.1 K/UL (ref 0.3–1)
MONOCYTES NFR BLD: 1 % (ref 4–15)
NEUTROPHILS # BLD AUTO: 5.2 K/UL (ref 1.8–7.7)
NEUTROPHILS NFR BLD: 89.8 % (ref 38–73)
NRBC BLD-RTO: 0 /100 WBC
PLATELET # BLD AUTO: 296 K/UL (ref 150–350)
PMV BLD AUTO: 9.8 FL (ref 9.2–12.9)
POTASSIUM SERPL-SCNC: 4.7 MMOL/L (ref 3.5–5.1)
PROT SERPL-MCNC: 7.4 G/DL (ref 6–8.4)
RBC # BLD AUTO: 5.85 M/UL (ref 4.6–6.2)
SARS-COV-2 RDRP RESP QL NAA+PROBE: NEGATIVE
SODIUM SERPL-SCNC: 139 MMOL/L (ref 136–145)
TROPONIN I SERPL DL<=0.01 NG/ML-MCNC: 0.02 NG/ML (ref 0–0.03)
WBC # BLD AUTO: 5.78 K/UL (ref 3.9–12.7)

## 2021-03-28 PROCEDURE — 94761 N-INVAS EAR/PLS OXIMETRY MLT: CPT

## 2021-03-28 PROCEDURE — 94640 AIRWAY INHALATION TREATMENT: CPT

## 2021-03-28 PROCEDURE — 84484 ASSAY OF TROPONIN QUANT: CPT | Performed by: EMERGENCY MEDICINE

## 2021-03-28 PROCEDURE — 83880 ASSAY OF NATRIURETIC PEPTIDE: CPT | Performed by: EMERGENCY MEDICINE

## 2021-03-28 PROCEDURE — 93010 EKG 12-LEAD: ICD-10-PCS | Mod: ,,, | Performed by: INTERNAL MEDICINE

## 2021-03-28 PROCEDURE — 85025 COMPLETE CBC W/AUTO DIFF WBC: CPT | Performed by: EMERGENCY MEDICINE

## 2021-03-28 PROCEDURE — 93005 ELECTROCARDIOGRAM TRACING: CPT

## 2021-03-28 PROCEDURE — 80053 COMPREHEN METABOLIC PANEL: CPT | Performed by: EMERGENCY MEDICINE

## 2021-03-28 PROCEDURE — 99284 EMERGENCY DEPT VISIT MOD MDM: CPT | Mod: ,,, | Performed by: EMERGENCY MEDICINE

## 2021-03-28 PROCEDURE — 99285 EMERGENCY DEPT VISIT HI MDM: CPT | Mod: 25

## 2021-03-28 PROCEDURE — 27000221 HC OXYGEN, UP TO 24 HOURS

## 2021-03-28 PROCEDURE — 25000242 PHARM REV CODE 250 ALT 637 W/ HCPCS: Performed by: EMERGENCY MEDICINE

## 2021-03-28 PROCEDURE — 99284 PR EMERGENCY DEPT VISIT,LEVEL IV: ICD-10-PCS | Mod: ,,, | Performed by: EMERGENCY MEDICINE

## 2021-03-28 PROCEDURE — U0002 COVID-19 LAB TEST NON-CDC: HCPCS | Performed by: EMERGENCY MEDICINE

## 2021-03-28 PROCEDURE — 93010 ELECTROCARDIOGRAM REPORT: CPT | Mod: ,,, | Performed by: INTERNAL MEDICINE

## 2021-03-28 RX ORDER — ALBUTEROL SULFATE 2.5 MG/.5ML
2.5 SOLUTION RESPIRATORY (INHALATION)
Status: COMPLETED | OUTPATIENT
Start: 2021-03-28 | End: 2021-03-28

## 2021-03-28 RX ORDER — PREDNISONE 50 MG/1
50 TABLET ORAL EVERY MORNING
Qty: 4 TABLET | Refills: 0 | Status: SHIPPED | OUTPATIENT
Start: 2021-03-28 | End: 2021-04-11 | Stop reason: SDUPTHER

## 2021-03-28 RX ADMIN — ALBUTEROL SULFATE 2.5 MG: 2.5 SOLUTION RESPIRATORY (INHALATION) at 02:03

## 2021-03-29 ENCOUNTER — TELEPHONE (OUTPATIENT)
Dept: EMERGENCY MEDICINE | Facility: HOSPITAL | Age: 61
End: 2021-03-29

## 2021-04-04 ENCOUNTER — HOSPITAL ENCOUNTER (EMERGENCY)
Facility: HOSPITAL | Age: 61
Discharge: HOME | End: 2021-04-04
Attending: EMERGENCY MEDICINE
Payer: MEDICAID

## 2021-04-04 VITALS
SYSTOLIC BLOOD PRESSURE: 157 MMHG | RESPIRATION RATE: 19 BRPM | HEIGHT: 66 IN | HEART RATE: 84 BPM | OXYGEN SATURATION: 93 % | TEMPERATURE: 98 F | BODY MASS INDEX: 22.5 KG/M2 | DIASTOLIC BLOOD PRESSURE: 77 MMHG | WEIGHT: 140 LBS

## 2021-04-04 DIAGNOSIS — I50.9 CHF (CONGESTIVE HEART FAILURE): ICD-10-CM

## 2021-04-04 DIAGNOSIS — J44.1 COPD EXACERBATION: Primary | ICD-10-CM

## 2021-04-04 DIAGNOSIS — R06.02 SOB (SHORTNESS OF BREATH): ICD-10-CM

## 2021-04-04 LAB
BASOPHILS # BLD AUTO: 0.06 K/UL (ref 0–0.2)
BASOPHILS NFR BLD: 0.9 % (ref 0–1.9)
BNP SERPL-MCNC: 143 PG/ML (ref 0–99)
BUN SERPL-MCNC: 8 MG/DL (ref 6–30)
CHLORIDE SERPL-SCNC: 97 MMOL/L (ref 95–110)
CREAT SERPL-MCNC: 1.2 MG/DL (ref 0.5–1.4)
DIFFERENTIAL METHOD: ABNORMAL
EOSINOPHIL # BLD AUTO: 0.2 K/UL (ref 0–0.5)
EOSINOPHIL NFR BLD: 2.6 % (ref 0–8)
ERYTHROCYTE [DISTWIDTH] IN BLOOD BY AUTOMATED COUNT: 18.4 % (ref 11.5–14.5)
GLUCOSE SERPL-MCNC: 107 MG/DL (ref 70–110)
HCT VFR BLD AUTO: 45 % (ref 40–54)
HCT VFR BLD CALC: 47 %PCV (ref 36–54)
HGB BLD-MCNC: 13.9 G/DL (ref 14–18)
HYPOCHROMIA BLD QL SMEAR: ABNORMAL
IMM GRANULOCYTES # BLD AUTO: 0.02 K/UL (ref 0–0.04)
IMM GRANULOCYTES NFR BLD AUTO: 0.3 % (ref 0–0.5)
LYMPHOCYTES # BLD AUTO: 2.7 K/UL (ref 1–4.8)
LYMPHOCYTES NFR BLD: 38.6 % (ref 18–48)
MCH RBC QN AUTO: 25.6 PG (ref 27–31)
MCHC RBC AUTO-ENTMCNC: 30.9 G/DL (ref 32–36)
MCV RBC AUTO: 83 FL (ref 82–98)
MONOCYTES # BLD AUTO: 0.9 K/UL (ref 0.3–1)
MONOCYTES NFR BLD: 13.4 % (ref 4–15)
NEUTROPHILS # BLD AUTO: 3.1 K/UL (ref 1.8–7.7)
NEUTROPHILS NFR BLD: 44.2 % (ref 38–73)
NRBC BLD-RTO: 0 /100 WBC
PLATELET # BLD AUTO: 200 K/UL (ref 150–450)
PLATELET BLD QL SMEAR: ABNORMAL
PMV BLD AUTO: 10.2 FL (ref 9.2–12.9)
POC IONIZED CALCIUM: 1.08 MMOL/L (ref 1.06–1.42)
POC TCO2 (MEASURED): 33 MMOL/L (ref 23–29)
POTASSIUM BLD-SCNC: 4.1 MMOL/L (ref 3.5–5.1)
RBC # BLD AUTO: 5.44 M/UL (ref 4.6–6.2)
SAMPLE: ABNORMAL
SODIUM BLD-SCNC: 138 MMOL/L (ref 136–145)
TROPONIN I SERPL DL<=0.01 NG/ML-MCNC: 0.03 NG/ML (ref 0–0.03)
TROPONIN I SERPL DL<=0.01 NG/ML-MCNC: 0.04 NG/ML (ref 0–0.03)
WBC # BLD AUTO: 6.94 K/UL (ref 3.9–12.7)

## 2021-04-04 PROCEDURE — 83880 ASSAY OF NATRIURETIC PEPTIDE: CPT | Performed by: STUDENT IN AN ORGANIZED HEALTH CARE EDUCATION/TRAINING PROGRAM

## 2021-04-04 PROCEDURE — 84484 ASSAY OF TROPONIN QUANT: CPT | Mod: 91 | Performed by: STUDENT IN AN ORGANIZED HEALTH CARE EDUCATION/TRAINING PROGRAM

## 2021-04-04 PROCEDURE — 93005 ELECTROCARDIOGRAM TRACING: CPT

## 2021-04-04 PROCEDURE — 93010 ELECTROCARDIOGRAM REPORT: CPT | Mod: ,,, | Performed by: INTERNAL MEDICINE

## 2021-04-04 PROCEDURE — 80047 BASIC METABLC PNL IONIZED CA: CPT

## 2021-04-04 PROCEDURE — 25000242 PHARM REV CODE 250 ALT 637 W/ HCPCS: Performed by: STUDENT IN AN ORGANIZED HEALTH CARE EDUCATION/TRAINING PROGRAM

## 2021-04-04 PROCEDURE — 25000242 PHARM REV CODE 250 ALT 637 W/ HCPCS: Performed by: EMERGENCY MEDICINE

## 2021-04-04 PROCEDURE — 99285 EMERGENCY DEPT VISIT HI MDM: CPT | Mod: 25

## 2021-04-04 PROCEDURE — 85025 COMPLETE CBC W/AUTO DIFF WBC: CPT | Performed by: STUDENT IN AN ORGANIZED HEALTH CARE EDUCATION/TRAINING PROGRAM

## 2021-04-04 PROCEDURE — 27000221 HC OXYGEN, UP TO 24 HOURS

## 2021-04-04 PROCEDURE — 93010 EKG 12-LEAD: ICD-10-PCS | Mod: ,,, | Performed by: INTERNAL MEDICINE

## 2021-04-04 PROCEDURE — 99285 EMERGENCY DEPT VISIT HI MDM: CPT | Mod: ,,, | Performed by: EMERGENCY MEDICINE

## 2021-04-04 PROCEDURE — 94640 AIRWAY INHALATION TREATMENT: CPT

## 2021-04-04 PROCEDURE — 63600175 PHARM REV CODE 636 W HCPCS: Performed by: STUDENT IN AN ORGANIZED HEALTH CARE EDUCATION/TRAINING PROGRAM

## 2021-04-04 PROCEDURE — 94761 N-INVAS EAR/PLS OXIMETRY MLT: CPT

## 2021-04-04 PROCEDURE — 99285 PR EMERGENCY DEPT VISIT,LEVEL V: ICD-10-PCS | Mod: ,,, | Performed by: EMERGENCY MEDICINE

## 2021-04-04 PROCEDURE — 84484 ASSAY OF TROPONIN QUANT: CPT | Performed by: STUDENT IN AN ORGANIZED HEALTH CARE EDUCATION/TRAINING PROGRAM

## 2021-04-04 RX ORDER — IPRATROPIUM BROMIDE AND ALBUTEROL SULFATE 2.5; .5 MG/3ML; MG/3ML
3 SOLUTION RESPIRATORY (INHALATION)
Status: COMPLETED | OUTPATIENT
Start: 2021-04-04 | End: 2021-04-04

## 2021-04-04 RX ORDER — FLUTICASONE PROPIONATE AND SALMETEROL XINAFOATE 45; 21 UG/1; UG/1
2 AEROSOL, METERED RESPIRATORY (INHALATION) EVERY 12 HOURS
Qty: 12 G | Refills: 1 | Status: SHIPPED | OUTPATIENT
Start: 2021-04-04 | End: 2021-04-17 | Stop reason: SDUPTHER

## 2021-04-04 RX ORDER — METHYLPREDNISOLONE SOD SUCC 125 MG
125 VIAL (EA) INJECTION
Status: COMPLETED | OUTPATIENT
Start: 2021-04-04 | End: 2021-04-04

## 2021-04-04 RX ADMIN — METHYLPREDNISOLONE SODIUM SUCCINATE 125 MG: 125 INJECTION, POWDER, FOR SOLUTION INTRAMUSCULAR; INTRAVENOUS at 03:04

## 2021-04-04 RX ADMIN — IPRATROPIUM BROMIDE AND ALBUTEROL SULFATE 3 ML: .5; 2.5 SOLUTION RESPIRATORY (INHALATION) at 03:04

## 2021-04-04 RX ADMIN — IPRATROPIUM BROMIDE AND ALBUTEROL SULFATE 3 ML: .5; 2.5 SOLUTION RESPIRATORY (INHALATION) at 05:04

## 2021-04-04 RX ADMIN — IPRATROPIUM BROMIDE AND ALBUTEROL SULFATE 3 ML: .5; 2.5 SOLUTION RESPIRATORY (INHALATION) at 06:04

## 2021-04-05 ENCOUNTER — TELEPHONE (OUTPATIENT)
Dept: EMERGENCY MEDICINE | Facility: HOSPITAL | Age: 61
End: 2021-04-05

## 2021-04-11 ENCOUNTER — HOSPITAL ENCOUNTER (EMERGENCY)
Facility: HOSPITAL | Age: 61
Discharge: HOME | End: 2021-04-11
Attending: EMERGENCY MEDICINE
Payer: MEDICAID

## 2021-04-11 VITALS
BODY MASS INDEX: 20.89 KG/M2 | HEART RATE: 104 BPM | SYSTOLIC BLOOD PRESSURE: 149 MMHG | TEMPERATURE: 98 F | DIASTOLIC BLOOD PRESSURE: 82 MMHG | RESPIRATION RATE: 20 BRPM | OXYGEN SATURATION: 95 % | HEIGHT: 66 IN | WEIGHT: 130 LBS

## 2021-04-11 DIAGNOSIS — R06.02 SOB (SHORTNESS OF BREATH): Primary | ICD-10-CM

## 2021-04-11 LAB
ALBUMIN SERPL BCP-MCNC: 3.3 G/DL (ref 3.5–5.2)
ALP SERPL-CCNC: 95 U/L (ref 55–135)
ALT SERPL W/O P-5'-P-CCNC: 9 U/L (ref 10–44)
ANION GAP SERPL CALC-SCNC: 11 MMOL/L (ref 8–16)
AST SERPL-CCNC: 28 U/L (ref 10–40)
BASOPHILS # BLD AUTO: 0.04 K/UL (ref 0–0.2)
BASOPHILS NFR BLD: 0.5 % (ref 0–1.9)
BILIRUB SERPL-MCNC: 0.2 MG/DL (ref 0.1–1)
BUN SERPL-MCNC: 9 MG/DL (ref 6–20)
CALCIUM SERPL-MCNC: 8.1 MG/DL (ref 8.7–10.5)
CHLORIDE SERPL-SCNC: 100 MMOL/L (ref 95–110)
CO2 SERPL-SCNC: 25 MMOL/L (ref 23–29)
CREAT SERPL-MCNC: 0.9 MG/DL (ref 0.5–1.4)
DIFFERENTIAL METHOD: ABNORMAL
EOSINOPHIL # BLD AUTO: 0.2 K/UL (ref 0–0.5)
EOSINOPHIL NFR BLD: 3 % (ref 0–8)
ERYTHROCYTE [DISTWIDTH] IN BLOOD BY AUTOMATED COUNT: 19 % (ref 11.5–14.5)
EST. GFR  (AFRICAN AMERICAN): >60 ML/MIN/1.73 M^2
EST. GFR  (NON AFRICAN AMERICAN): >60 ML/MIN/1.73 M^2
GLUCOSE SERPL-MCNC: 103 MG/DL (ref 70–110)
HCT VFR BLD AUTO: 43 % (ref 40–54)
HGB BLD-MCNC: 13.4 G/DL (ref 14–18)
IMM GRANULOCYTES # BLD AUTO: 0.02 K/UL (ref 0–0.04)
IMM GRANULOCYTES NFR BLD AUTO: 0.2 % (ref 0–0.5)
LYMPHOCYTES # BLD AUTO: 2.6 K/UL (ref 1–4.8)
LYMPHOCYTES NFR BLD: 32.5 % (ref 18–48)
MCH RBC QN AUTO: 25.6 PG (ref 27–31)
MCHC RBC AUTO-ENTMCNC: 31.2 G/DL (ref 32–36)
MCV RBC AUTO: 82 FL (ref 82–98)
MONOCYTES # BLD AUTO: 1 K/UL (ref 0.3–1)
MONOCYTES NFR BLD: 12.7 % (ref 4–15)
NEUTROPHILS # BLD AUTO: 4.1 K/UL (ref 1.8–7.7)
NEUTROPHILS NFR BLD: 51.1 % (ref 38–73)
NRBC BLD-RTO: 0 /100 WBC
PLATELET # BLD AUTO: 296 K/UL (ref 150–450)
PMV BLD AUTO: 9.8 FL (ref 9.2–12.9)
POTASSIUM SERPL-SCNC: 5.2 MMOL/L (ref 3.5–5.1)
PROT SERPL-MCNC: 7.6 G/DL (ref 6–8.4)
RBC # BLD AUTO: 5.24 M/UL (ref 4.6–6.2)
SODIUM SERPL-SCNC: 136 MMOL/L (ref 136–145)
WBC # BLD AUTO: 8.05 K/UL (ref 3.9–12.7)

## 2021-04-11 PROCEDURE — 85025 COMPLETE CBC W/AUTO DIFF WBC: CPT | Performed by: EMERGENCY MEDICINE

## 2021-04-11 PROCEDURE — 94640 AIRWAY INHALATION TREATMENT: CPT

## 2021-04-11 PROCEDURE — 94761 N-INVAS EAR/PLS OXIMETRY MLT: CPT

## 2021-04-11 PROCEDURE — 99291 CRITICAL CARE FIRST HOUR: CPT | Mod: ,,, | Performed by: EMERGENCY MEDICINE

## 2021-04-11 PROCEDURE — 99285 EMERGENCY DEPT VISIT HI MDM: CPT | Mod: 25

## 2021-04-11 PROCEDURE — 63600175 PHARM REV CODE 636 W HCPCS: Performed by: EMERGENCY MEDICINE

## 2021-04-11 PROCEDURE — 25000242 PHARM REV CODE 250 ALT 637 W/ HCPCS: Performed by: EMERGENCY MEDICINE

## 2021-04-11 PROCEDURE — 99291 PR CRITICAL CARE, E/M 30-74 MINUTES: ICD-10-PCS | Mod: ,,, | Performed by: EMERGENCY MEDICINE

## 2021-04-11 PROCEDURE — 80053 COMPREHEN METABOLIC PANEL: CPT | Performed by: EMERGENCY MEDICINE

## 2021-04-11 RX ORDER — PREDNISONE 50 MG/1
50 TABLET ORAL EVERY MORNING
Qty: 4 TABLET | Refills: 0 | Status: SHIPPED | OUTPATIENT
Start: 2021-04-11 | End: 2021-04-17 | Stop reason: SDUPTHER

## 2021-04-11 RX ORDER — IPRATROPIUM BROMIDE AND ALBUTEROL SULFATE 2.5; .5 MG/3ML; MG/3ML
3 SOLUTION RESPIRATORY (INHALATION)
Status: COMPLETED | OUTPATIENT
Start: 2021-04-11 | End: 2021-04-11

## 2021-04-11 RX ORDER — ALBUTEROL SULFATE 90 UG/1
2 AEROSOL, METERED RESPIRATORY (INHALATION) EVERY 4 HOURS PRN
Qty: 6.7 G | Refills: 1 | Status: SHIPPED | OUTPATIENT
Start: 2021-04-11 | End: 2021-05-11

## 2021-04-11 RX ORDER — PREDNISONE 20 MG/1
60 TABLET ORAL
Status: COMPLETED | OUTPATIENT
Start: 2021-04-11 | End: 2021-04-11

## 2021-04-11 RX ADMIN — IPRATROPIUM BROMIDE AND ALBUTEROL SULFATE 3 ML: .5; 2.5 SOLUTION RESPIRATORY (INHALATION) at 04:04

## 2021-04-11 RX ADMIN — PREDNISONE 60 MG: 20 TABLET ORAL at 04:04

## 2021-04-12 ENCOUNTER — TELEPHONE (OUTPATIENT)
Dept: EMERGENCY MEDICINE | Facility: HOSPITAL | Age: 61
End: 2021-04-12

## 2021-04-13 ENCOUNTER — TELEPHONE (OUTPATIENT)
Dept: PULMONOLOGY | Facility: CLINIC | Age: 61
End: 2021-04-13

## 2021-04-13 DIAGNOSIS — R06.02 SHORTNESS OF BREATH: ICD-10-CM

## 2021-04-13 DIAGNOSIS — R06.02 SOB (SHORTNESS OF BREATH): Primary | ICD-10-CM

## 2021-04-17 ENCOUNTER — HOSPITAL ENCOUNTER (EMERGENCY)
Facility: HOSPITAL | Age: 61
Discharge: HOME | End: 2021-04-17
Attending: EMERGENCY MEDICINE
Payer: MEDICAID

## 2021-04-17 VITALS
TEMPERATURE: 97 F | HEART RATE: 84 BPM | DIASTOLIC BLOOD PRESSURE: 63 MMHG | OXYGEN SATURATION: 96 % | RESPIRATION RATE: 18 BRPM | SYSTOLIC BLOOD PRESSURE: 121 MMHG

## 2021-04-17 DIAGNOSIS — R06.02 SOB (SHORTNESS OF BREATH): ICD-10-CM

## 2021-04-17 DIAGNOSIS — J44.1 COPD EXACERBATION: Primary | ICD-10-CM

## 2021-04-17 LAB
ALBUMIN SERPL BCP-MCNC: 3.3 G/DL (ref 3.5–5.2)
ALLENS TEST: ABNORMAL
ALLENS TEST: NORMAL
ALP SERPL-CCNC: 101 U/L (ref 55–135)
ALT SERPL W/O P-5'-P-CCNC: 10 U/L (ref 10–44)
ANION GAP SERPL CALC-SCNC: 11 MMOL/L (ref 8–16)
AST SERPL-CCNC: 19 U/L (ref 10–40)
BASOPHILS # BLD AUTO: 0.07 K/UL (ref 0–0.2)
BASOPHILS NFR BLD: 0.8 % (ref 0–1.9)
BILIRUB SERPL-MCNC: 0.2 MG/DL (ref 0.1–1)
BNP SERPL-MCNC: 29 PG/ML (ref 0–99)
BUN SERPL-MCNC: 9 MG/DL (ref 6–20)
BUN SERPL-MCNC: 9 MG/DL (ref 6–30)
CALCIUM SERPL-MCNC: 8.5 MG/DL (ref 8.7–10.5)
CHLORIDE SERPL-SCNC: 102 MMOL/L (ref 95–110)
CHLORIDE SERPL-SCNC: 99 MMOL/L (ref 95–110)
CO2 SERPL-SCNC: 29 MMOL/L (ref 23–29)
CREAT SERPL-MCNC: 0.8 MG/DL (ref 0.5–1.4)
CREAT SERPL-MCNC: 1.3 MG/DL (ref 0.5–1.4)
D DIMER PPP IA.FEU-MCNC: 0.69 MG/L FEU
DIFFERENTIAL METHOD: ABNORMAL
EOSINOPHIL # BLD AUTO: 0.2 K/UL (ref 0–0.5)
EOSINOPHIL NFR BLD: 2.5 % (ref 0–8)
ERYTHROCYTE [DISTWIDTH] IN BLOOD BY AUTOMATED COUNT: 19.5 % (ref 11.5–14.5)
EST. GFR  (AFRICAN AMERICAN): >60 ML/MIN/1.73 M^2
EST. GFR  (NON AFRICAN AMERICAN): >60 ML/MIN/1.73 M^2
GLUCOSE SERPL-MCNC: 90 MG/DL (ref 70–110)
GLUCOSE SERPL-MCNC: 90 MG/DL (ref 70–110)
HCO3 UR-SCNC: 35.5 MMOL/L (ref 24–28)
HCT VFR BLD AUTO: 46.3 % (ref 40–54)
HCT VFR BLD CALC: 49 %PCV (ref 36–54)
HGB BLD-MCNC: 14.3 G/DL (ref 14–18)
IMM GRANULOCYTES # BLD AUTO: 0.03 K/UL (ref 0–0.04)
IMM GRANULOCYTES NFR BLD AUTO: 0.4 % (ref 0–0.5)
LACTATE SERPL-SCNC: 1.9 MMOL/L (ref 0.5–2.2)
LDH SERPL L TO P-CCNC: 1.78 MMOL/L (ref 0.5–2.2)
LYMPHOCYTES # BLD AUTO: 2.6 K/UL (ref 1–4.8)
LYMPHOCYTES NFR BLD: 30.9 % (ref 18–48)
MAGNESIUM SERPL-MCNC: 2.1 MG/DL (ref 1.6–2.6)
MCH RBC QN AUTO: 25.7 PG (ref 27–31)
MCHC RBC AUTO-ENTMCNC: 30.9 G/DL (ref 32–36)
MCV RBC AUTO: 83 FL (ref 82–98)
MONOCYTES # BLD AUTO: 0.8 K/UL (ref 0.3–1)
MONOCYTES NFR BLD: 9.7 % (ref 4–15)
NEUTROPHILS # BLD AUTO: 4.6 K/UL (ref 1.8–7.7)
NEUTROPHILS NFR BLD: 55.7 % (ref 38–73)
NRBC BLD-RTO: 0 /100 WBC
PCO2 BLDA: 82.3 MMHG (ref 35–45)
PH SMN: 7.24 [PH] (ref 7.35–7.45)
PLATELET # BLD AUTO: 328 K/UL (ref 150–450)
PMV BLD AUTO: 9.5 FL (ref 9.2–12.9)
PO2 BLDA: 26 MMHG (ref 40–60)
POC BE: 8 MMOL/L
POC IONIZED CALCIUM: 0.97 MMOL/L (ref 1.06–1.42)
POC SATURATED O2: 35 % (ref 95–100)
POC TCO2 (MEASURED): 32 MMOL/L (ref 23–29)
POC TCO2: 38 MMOL/L (ref 24–29)
POTASSIUM BLD-SCNC: 4 MMOL/L (ref 3.5–5.1)
POTASSIUM SERPL-SCNC: 4.1 MMOL/L (ref 3.5–5.1)
PROT SERPL-MCNC: 7.3 G/DL (ref 6–8.4)
RBC # BLD AUTO: 5.56 M/UL (ref 4.6–6.2)
SAMPLE: ABNORMAL
SAMPLE: ABNORMAL
SAMPLE: NORMAL
SITE: ABNORMAL
SITE: NORMAL
SODIUM BLD-SCNC: 140 MMOL/L (ref 136–145)
SODIUM SERPL-SCNC: 142 MMOL/L (ref 136–145)
TROPONIN I SERPL DL<=0.01 NG/ML-MCNC: 0.04 NG/ML (ref 0–0.03)
WBC # BLD AUTO: 8.28 K/UL (ref 3.9–12.7)

## 2021-04-17 PROCEDURE — 93010 EKG 12-LEAD: ICD-10-PCS | Mod: ,,, | Performed by: INTERNAL MEDICINE

## 2021-04-17 PROCEDURE — 27100098 HC SPACER

## 2021-04-17 PROCEDURE — 80053 COMPREHEN METABOLIC PANEL: CPT | Performed by: EMERGENCY MEDICINE

## 2021-04-17 PROCEDURE — 99285 EMERGENCY DEPT VISIT HI MDM: CPT | Mod: 25,,, | Performed by: EMERGENCY MEDICINE

## 2021-04-17 PROCEDURE — 83880 ASSAY OF NATRIURETIC PEPTIDE: CPT | Performed by: EMERGENCY MEDICINE

## 2021-04-17 PROCEDURE — 25000242 PHARM REV CODE 250 ALT 637 W/ HCPCS: Performed by: EMERGENCY MEDICINE

## 2021-04-17 PROCEDURE — 85025 COMPLETE CBC W/AUTO DIFF WBC: CPT | Performed by: EMERGENCY MEDICINE

## 2021-04-17 PROCEDURE — 93005 ELECTROCARDIOGRAM TRACING: CPT

## 2021-04-17 PROCEDURE — 83735 ASSAY OF MAGNESIUM: CPT | Performed by: EMERGENCY MEDICINE

## 2021-04-17 PROCEDURE — 99285 PR EMERGENCY DEPT VISIT,LEVEL V: ICD-10-PCS | Mod: 25,,, | Performed by: EMERGENCY MEDICINE

## 2021-04-17 PROCEDURE — 80047 BASIC METABLC PNL IONIZED CA: CPT

## 2021-04-17 PROCEDURE — 83605 ASSAY OF LACTIC ACID: CPT | Performed by: EMERGENCY MEDICINE

## 2021-04-17 PROCEDURE — 94761 N-INVAS EAR/PLS OXIMETRY MLT: CPT

## 2021-04-17 PROCEDURE — 84484 ASSAY OF TROPONIN QUANT: CPT | Performed by: EMERGENCY MEDICINE

## 2021-04-17 PROCEDURE — 25000003 PHARM REV CODE 250: Performed by: EMERGENCY MEDICINE

## 2021-04-17 PROCEDURE — 99406 BEHAV CHNG SMOKING 3-10 MIN: CPT | Mod: ,,, | Performed by: EMERGENCY MEDICINE

## 2021-04-17 PROCEDURE — 96374 THER/PROPH/DIAG INJ IV PUSH: CPT

## 2021-04-17 PROCEDURE — 94640 AIRWAY INHALATION TREATMENT: CPT

## 2021-04-17 PROCEDURE — 99406 PR TOBACCO USE CESSATION INTERMEDIATE 3-10 MINUTES: ICD-10-PCS | Mod: ,,, | Performed by: EMERGENCY MEDICINE

## 2021-04-17 PROCEDURE — 63600175 PHARM REV CODE 636 W HCPCS: Performed by: EMERGENCY MEDICINE

## 2021-04-17 PROCEDURE — 99285 EMERGENCY DEPT VISIT HI MDM: CPT | Mod: 25

## 2021-04-17 PROCEDURE — 93010 ELECTROCARDIOGRAM REPORT: CPT | Mod: ,,, | Performed by: INTERNAL MEDICINE

## 2021-04-17 PROCEDURE — 85379 FIBRIN DEGRADATION QUANT: CPT | Performed by: EMERGENCY MEDICINE

## 2021-04-17 PROCEDURE — 82330 ASSAY OF CALCIUM: CPT

## 2021-04-17 RX ORDER — IPRATROPIUM BROMIDE AND ALBUTEROL SULFATE 2.5; .5 MG/3ML; MG/3ML
3 SOLUTION RESPIRATORY (INHALATION)
Status: COMPLETED | OUTPATIENT
Start: 2021-04-17 | End: 2021-04-17

## 2021-04-17 RX ORDER — FLUTICASONE PROPIONATE AND SALMETEROL XINAFOATE 45; 21 UG/1; UG/1
2 AEROSOL, METERED RESPIRATORY (INHALATION) EVERY 12 HOURS
Qty: 12 G | Refills: 1 | Status: SHIPPED | OUTPATIENT
Start: 2021-04-17 | End: 2021-04-27 | Stop reason: ALTCHOICE

## 2021-04-17 RX ORDER — ISOSORBIDE DINITRATE 10 MG/1
20 TABLET ORAL 3 TIMES DAILY
Status: DISCONTINUED | OUTPATIENT
Start: 2021-04-17 | End: 2021-04-17 | Stop reason: HOSPADM

## 2021-04-17 RX ORDER — ALBUTEROL SULFATE 90 UG/1
2 AEROSOL, METERED RESPIRATORY (INHALATION)
Status: COMPLETED | OUTPATIENT
Start: 2021-04-17 | End: 2021-04-17

## 2021-04-17 RX ORDER — HYDRALAZINE HYDROCHLORIDE 25 MG/1
25 TABLET, FILM COATED ORAL
Status: COMPLETED | OUTPATIENT
Start: 2021-04-17 | End: 2021-04-17

## 2021-04-17 RX ORDER — AMLODIPINE BESYLATE 5 MG/1
5 TABLET ORAL
Status: COMPLETED | OUTPATIENT
Start: 2021-04-17 | End: 2021-04-17

## 2021-04-17 RX ORDER — METHYLPREDNISOLONE SOD SUCC 125 MG
125 VIAL (EA) INJECTION
Status: COMPLETED | OUTPATIENT
Start: 2021-04-17 | End: 2021-04-17

## 2021-04-17 RX ORDER — PREDNISONE 50 MG/1
50 TABLET ORAL EVERY MORNING
Qty: 4 TABLET | Refills: 0 | Status: ON HOLD | OUTPATIENT
Start: 2021-04-17 | End: 2021-05-02 | Stop reason: HOSPADM

## 2021-04-17 RX ADMIN — METHYLPREDNISOLONE SODIUM SUCCINATE 125 MG: 125 INJECTION, POWDER, FOR SOLUTION INTRAMUSCULAR; INTRAVENOUS at 05:04

## 2021-04-17 RX ADMIN — IPRATROPIUM BROMIDE AND ALBUTEROL SULFATE 3 ML: .5; 2.5 SOLUTION RESPIRATORY (INHALATION) at 07:04

## 2021-04-17 RX ADMIN — ISOSORBIDE DINITRATE 20 MG: 10 TABLET ORAL at 05:04

## 2021-04-17 RX ADMIN — ALBUTEROL SULFATE 2 PUFF: 108 INHALANT RESPIRATORY (INHALATION) at 05:04

## 2021-04-17 RX ADMIN — AMLODIPINE BESYLATE 5 MG: 5 TABLET ORAL at 05:04

## 2021-04-17 RX ADMIN — HYDRALAZINE HYDROCHLORIDE 25 MG: 25 TABLET, FILM COATED ORAL at 05:04

## 2021-04-24 ENCOUNTER — LAB VISIT (OUTPATIENT)
Dept: INTERNAL MEDICINE | Facility: CLINIC | Age: 61
End: 2021-04-24
Payer: MEDICAID

## 2021-04-24 DIAGNOSIS — R06.02 SHORTNESS OF BREATH: ICD-10-CM

## 2021-04-24 PROCEDURE — U0005 INFEC AGEN DETEC AMPLI PROBE: HCPCS | Performed by: NURSE PRACTITIONER

## 2021-04-24 PROCEDURE — U0003 INFECTIOUS AGENT DETECTION BY NUCLEIC ACID (DNA OR RNA); SEVERE ACUTE RESPIRATORY SYNDROME CORONAVIRUS 2 (SARS-COV-2) (CORONAVIRUS DISEASE [COVID-19]), AMPLIFIED PROBE TECHNIQUE, MAKING USE OF HIGH THROUGHPUT TECHNOLOGIES AS DESCRIBED BY CMS-2020-01-R: HCPCS | Performed by: NURSE PRACTITIONER

## 2021-04-26 LAB — SARS-COV-2 RNA RESP QL NAA+PROBE: NOT DETECTED

## 2021-04-27 ENCOUNTER — HOSPITAL ENCOUNTER (OUTPATIENT)
Dept: PULMONOLOGY | Facility: CLINIC | Age: 61
Discharge: HOME | End: 2021-04-27
Payer: MEDICAID

## 2021-04-27 ENCOUNTER — OFFICE VISIT (OUTPATIENT)
Dept: PULMONOLOGY | Facility: CLINIC | Age: 61
End: 2021-04-27
Payer: MEDICAID

## 2021-04-27 VITALS
DIASTOLIC BLOOD PRESSURE: 85 MMHG | HEART RATE: 76 BPM | WEIGHT: 140 LBS | BODY MASS INDEX: 20.04 KG/M2 | OXYGEN SATURATION: 97 % | HEIGHT: 70 IN | SYSTOLIC BLOOD PRESSURE: 154 MMHG

## 2021-04-27 DIAGNOSIS — R06.02 SOB (SHORTNESS OF BREATH): ICD-10-CM

## 2021-04-27 DIAGNOSIS — J44.9 CHRONIC OBSTRUCTIVE PULMONARY DISEASE, UNSPECIFIED COPD TYPE: Primary | ICD-10-CM

## 2021-04-27 DIAGNOSIS — J44.1 COPD EXACERBATION: ICD-10-CM

## 2021-04-27 DIAGNOSIS — F17.210 CIGARETTE NICOTINE DEPENDENCE WITHOUT COMPLICATION: ICD-10-CM

## 2021-04-27 DIAGNOSIS — R93.89 ABNORMAL CHEST CT: ICD-10-CM

## 2021-04-27 DIAGNOSIS — J96.10 CHRONIC RESPIRATORY FAILURE, UNSPECIFIED WHETHER WITH HYPOXIA OR HYPERCAPNIA: ICD-10-CM

## 2021-04-27 PROBLEM — J96.01 ACUTE HYPOXEMIC RESPIRATORY FAILURE: Status: RESOLVED | Noted: 2018-10-03 | Resolved: 2021-04-27

## 2021-04-27 PROBLEM — J96.21 ACUTE ON CHRONIC RESPIRATORY FAILURE WITH HYPOXIA: Status: RESOLVED | Noted: 2020-04-03 | Resolved: 2021-04-27

## 2021-04-27 PROCEDURE — 94729 DIFFUSING CAPACITY: CPT | Mod: PBBFAC | Performed by: INTERNAL MEDICINE

## 2021-04-27 PROCEDURE — 94010 BREATHING CAPACITY TEST: CPT | Mod: 26,S$PBB,, | Performed by: INTERNAL MEDICINE

## 2021-04-27 PROCEDURE — 94727 GAS DIL/WSHOT DETER LNG VOL: CPT | Mod: PBBFAC | Performed by: INTERNAL MEDICINE

## 2021-04-27 PROCEDURE — 99214 OFFICE O/P EST MOD 30 MIN: CPT | Mod: PBBFAC | Performed by: NURSE PRACTITIONER

## 2021-04-27 PROCEDURE — 94729 DIFFUSING CAPACITY: CPT | Mod: 26,S$PBB,, | Performed by: INTERNAL MEDICINE

## 2021-04-27 PROCEDURE — 99999 PR PBB SHADOW E&M-EST. PATIENT-LVL IV: ICD-10-PCS | Mod: PBBFAC,,, | Performed by: NURSE PRACTITIONER

## 2021-04-27 PROCEDURE — 94727 GAS DIL/WSHOT DETER LNG VOL: CPT | Mod: 26,S$PBB,, | Performed by: INTERNAL MEDICINE

## 2021-04-27 PROCEDURE — 99406 PR TOBACCO USE CESSATION INTERMEDIATE 3-10 MINUTES: ICD-10-PCS | Mod: S$PBB,,, | Performed by: NURSE PRACTITIONER

## 2021-04-27 PROCEDURE — 94010 BREATHING CAPACITY TEST: CPT | Mod: PBBFAC | Performed by: INTERNAL MEDICINE

## 2021-04-27 PROCEDURE — 99214 PR OFFICE/OUTPT VISIT, EST, LEVL IV, 30-39 MIN: ICD-10-PCS | Mod: 25,S$PBB,ICN, | Performed by: NURSE PRACTITIONER

## 2021-04-27 PROCEDURE — 94727 PR PULM FUNCTION TEST BY GAS: ICD-10-PCS | Mod: 26,S$PBB,, | Performed by: INTERNAL MEDICINE

## 2021-04-27 PROCEDURE — 94010 BREATHING CAPACITY TEST: ICD-10-PCS | Mod: 26,S$PBB,, | Performed by: INTERNAL MEDICINE

## 2021-04-27 PROCEDURE — 94729 PR C02/MEMBANE DIFFUSE CAPACITY: ICD-10-PCS | Mod: 26,S$PBB,, | Performed by: INTERNAL MEDICINE

## 2021-04-27 PROCEDURE — 99214 OFFICE O/P EST MOD 30 MIN: CPT | Mod: 25,S$PBB,ICN, | Performed by: NURSE PRACTITIONER

## 2021-04-27 PROCEDURE — 99999 PR PBB SHADOW E&M-EST. PATIENT-LVL IV: CPT | Mod: PBBFAC,,, | Performed by: NURSE PRACTITIONER

## 2021-04-27 PROCEDURE — 99406 BEHAV CHNG SMOKING 3-10 MIN: CPT | Mod: S$PBB,,, | Performed by: NURSE PRACTITIONER

## 2021-04-27 RX ORDER — LOSARTAN POTASSIUM AND HYDROCHLOROTHIAZIDE 12.5; 1 MG/1; MG/1
1 TABLET ORAL DAILY
Status: ON HOLD | COMMUNITY
Start: 2021-04-20 | End: 2021-06-10 | Stop reason: HOSPADM

## 2021-04-30 ENCOUNTER — HOSPITAL ENCOUNTER (OUTPATIENT)
Facility: HOSPITAL | Age: 61
Discharge: HOME | End: 2021-05-02
Attending: EMERGENCY MEDICINE | Admitting: INTERNAL MEDICINE
Payer: MEDICAID

## 2021-04-30 DIAGNOSIS — R07.9 CHEST PAIN, UNSPECIFIED TYPE: ICD-10-CM

## 2021-04-30 DIAGNOSIS — Z71.6 ENCOUNTER FOR SMOKING CESSATION COUNSELING: ICD-10-CM

## 2021-04-30 DIAGNOSIS — J96.22 ACUTE ON CHRONIC RESPIRATORY FAILURE WITH HYPOXIA AND HYPERCAPNIA: ICD-10-CM

## 2021-04-30 DIAGNOSIS — R09.02 HYPOXIA: ICD-10-CM

## 2021-04-30 DIAGNOSIS — R93.89 ABNORMAL CHEST CT: ICD-10-CM

## 2021-04-30 DIAGNOSIS — J96.21 ACUTE ON CHRONIC RESPIRATORY FAILURE WITH HYPOXIA AND HYPERCAPNIA: ICD-10-CM

## 2021-04-30 DIAGNOSIS — R07.9 CHEST PAIN: ICD-10-CM

## 2021-04-30 DIAGNOSIS — F17.210 CIGARETTE NICOTINE DEPENDENCE WITHOUT COMPLICATION: ICD-10-CM

## 2021-04-30 DIAGNOSIS — J44.1 COPD EXACERBATION: Primary | ICD-10-CM

## 2021-04-30 DIAGNOSIS — J44.1 CHRONIC OBSTRUCTIVE PULMONARY DISEASE WITH ACUTE EXACERBATION: ICD-10-CM

## 2021-04-30 PROBLEM — R10.9 ABDOMINAL PAIN: Status: RESOLVED | Noted: 2020-03-16 | Resolved: 2021-04-30

## 2021-04-30 PROBLEM — E87.20 LACTIC ACID INCREASED: Status: RESOLVED | Noted: 2020-04-03 | Resolved: 2021-04-30

## 2021-04-30 LAB
ALBUMIN SERPL BCP-MCNC: 3.2 G/DL (ref 3.5–5.2)
ALP SERPL-CCNC: 72 U/L (ref 55–135)
ALT SERPL W/O P-5'-P-CCNC: 10 U/L (ref 10–44)
ANION GAP SERPL CALC-SCNC: 11 MMOL/L (ref 8–16)
AST SERPL-CCNC: 13 U/L (ref 10–40)
BASOPHILS # BLD AUTO: 0.05 K/UL (ref 0–0.2)
BASOPHILS NFR BLD: 0.4 % (ref 0–1.9)
BILIRUB SERPL-MCNC: 0.4 MG/DL (ref 0.1–1)
BNP SERPL-MCNC: 46 PG/ML (ref 0–99)
BUN SERPL-MCNC: 12 MG/DL (ref 6–20)
CALCIUM SERPL-MCNC: 9.3 MG/DL (ref 8.7–10.5)
CHLORIDE SERPL-SCNC: 98 MMOL/L (ref 95–110)
CO2 SERPL-SCNC: 32 MMOL/L (ref 23–29)
CREAT SERPL-MCNC: 0.9 MG/DL (ref 0.5–1.4)
CTP QC/QA: YES
DIFFERENTIAL METHOD: ABNORMAL
EOSINOPHIL # BLD AUTO: 0.2 K/UL (ref 0–0.5)
EOSINOPHIL NFR BLD: 1.4 % (ref 0–8)
ERYTHROCYTE [DISTWIDTH] IN BLOOD BY AUTOMATED COUNT: 19.9 % (ref 11.5–14.5)
EST. GFR  (AFRICAN AMERICAN): >60 ML/MIN/1.73 M^2
EST. GFR  (NON AFRICAN AMERICAN): >60 ML/MIN/1.73 M^2
GLUCOSE SERPL-MCNC: 87 MG/DL (ref 70–110)
HCT VFR BLD AUTO: 45.6 % (ref 40–54)
HGB BLD-MCNC: 14 G/DL (ref 14–18)
IMM GRANULOCYTES # BLD AUTO: 0.08 K/UL (ref 0–0.04)
IMM GRANULOCYTES NFR BLD AUTO: 0.7 % (ref 0–0.5)
LYMPHOCYTES # BLD AUTO: 3.1 K/UL (ref 1–4.8)
LYMPHOCYTES NFR BLD: 28.1 % (ref 18–48)
MAGNESIUM SERPL-MCNC: 1.6 MG/DL (ref 1.6–2.6)
MCH RBC QN AUTO: 26 PG (ref 27–31)
MCHC RBC AUTO-ENTMCNC: 30.7 G/DL (ref 32–36)
MCV RBC AUTO: 85 FL (ref 82–98)
MONOCYTES # BLD AUTO: 1.2 K/UL (ref 0.3–1)
MONOCYTES NFR BLD: 10.5 % (ref 4–15)
NEUTROPHILS # BLD AUTO: 6.5 K/UL (ref 1.8–7.7)
NEUTROPHILS NFR BLD: 58.9 % (ref 38–73)
NRBC BLD-RTO: 0 /100 WBC
PLATELET # BLD AUTO: 292 K/UL (ref 150–450)
PMV BLD AUTO: 10 FL (ref 9.2–12.9)
POTASSIUM SERPL-SCNC: 3.5 MMOL/L (ref 3.5–5.1)
PROT SERPL-MCNC: 6.5 G/DL (ref 6–8.4)
RBC # BLD AUTO: 5.39 M/UL (ref 4.6–6.2)
SARS-COV-2 RDRP RESP QL NAA+PROBE: NEGATIVE
SODIUM SERPL-SCNC: 141 MMOL/L (ref 136–145)
TROPONIN I SERPL DL<=0.01 NG/ML-MCNC: 0.03 NG/ML (ref 0–0.03)
WBC # BLD AUTO: 11.13 K/UL (ref 3.9–12.7)

## 2021-04-30 PROCEDURE — 93005 ELECTROCARDIOGRAM TRACING: CPT

## 2021-04-30 PROCEDURE — 96374 THER/PROPH/DIAG INJ IV PUSH: CPT

## 2021-04-30 PROCEDURE — G0378 HOSPITAL OBSERVATION PER HR: HCPCS

## 2021-04-30 PROCEDURE — 99284 EMERGENCY DEPT VISIT MOD MDM: CPT | Mod: CS,,, | Performed by: EMERGENCY MEDICINE

## 2021-04-30 PROCEDURE — 83735 ASSAY OF MAGNESIUM: CPT | Performed by: EMERGENCY MEDICINE

## 2021-04-30 PROCEDURE — 93010 EKG 12-LEAD: ICD-10-PCS | Mod: ,,, | Performed by: INTERNAL MEDICINE

## 2021-04-30 PROCEDURE — 99284 PR EMERGENCY DEPT VISIT,LEVEL IV: ICD-10-PCS | Mod: CS,,, | Performed by: EMERGENCY MEDICINE

## 2021-04-30 PROCEDURE — 25000242 PHARM REV CODE 250 ALT 637 W/ HCPCS: Performed by: EMERGENCY MEDICINE

## 2021-04-30 PROCEDURE — 94640 AIRWAY INHALATION TREATMENT: CPT

## 2021-04-30 PROCEDURE — 25000003 PHARM REV CODE 250: Performed by: HOSPITALIST

## 2021-04-30 PROCEDURE — 99220 PR INITIAL OBSERVATION CARE,LEVL III: ICD-10-PCS | Mod: ,,, | Performed by: HOSPITALIST

## 2021-04-30 PROCEDURE — 25000242 PHARM REV CODE 250 ALT 637 W/ HCPCS: Performed by: HOSPITALIST

## 2021-04-30 PROCEDURE — 83880 ASSAY OF NATRIURETIC PEPTIDE: CPT | Performed by: EMERGENCY MEDICINE

## 2021-04-30 PROCEDURE — 85025 COMPLETE CBC W/AUTO DIFF WBC: CPT | Performed by: EMERGENCY MEDICINE

## 2021-04-30 PROCEDURE — 93010 ELECTROCARDIOGRAM REPORT: CPT | Mod: ,,, | Performed by: INTERNAL MEDICINE

## 2021-04-30 PROCEDURE — 80053 COMPREHEN METABOLIC PANEL: CPT | Performed by: EMERGENCY MEDICINE

## 2021-04-30 PROCEDURE — 27000221 HC OXYGEN, UP TO 24 HOURS

## 2021-04-30 PROCEDURE — 63600175 PHARM REV CODE 636 W HCPCS: Performed by: EMERGENCY MEDICINE

## 2021-04-30 PROCEDURE — 99900035 HC TECH TIME PER 15 MIN (STAT)

## 2021-04-30 PROCEDURE — 84484 ASSAY OF TROPONIN QUANT: CPT | Performed by: EMERGENCY MEDICINE

## 2021-04-30 PROCEDURE — U0002 COVID-19 LAB TEST NON-CDC: HCPCS | Performed by: EMERGENCY MEDICINE

## 2021-04-30 PROCEDURE — 94761 N-INVAS EAR/PLS OXIMETRY MLT: CPT

## 2021-04-30 PROCEDURE — 99220 PR INITIAL OBSERVATION CARE,LEVL III: CPT | Mod: ,,, | Performed by: HOSPITALIST

## 2021-04-30 PROCEDURE — 99285 EMERGENCY DEPT VISIT HI MDM: CPT | Mod: 25

## 2021-04-30 RX ORDER — LEVOFLOXACIN 250 MG/1
750 TABLET ORAL DAILY
Status: DISCONTINUED | OUTPATIENT
Start: 2021-04-30 | End: 2021-05-02 | Stop reason: HOSPADM

## 2021-04-30 RX ORDER — IPRATROPIUM BROMIDE AND ALBUTEROL SULFATE 2.5; .5 MG/3ML; MG/3ML
3 SOLUTION RESPIRATORY (INHALATION)
Status: DISCONTINUED | OUTPATIENT
Start: 2021-04-30 | End: 2021-05-02 | Stop reason: HOSPADM

## 2021-04-30 RX ORDER — ALBUTEROL SULFATE 90 UG/1
1 AEROSOL, METERED RESPIRATORY (INHALATION) EVERY 4 HOURS PRN
Status: DISCONTINUED | OUTPATIENT
Start: 2021-04-30 | End: 2021-05-02 | Stop reason: HOSPADM

## 2021-04-30 RX ORDER — METHYLPREDNISOLONE SOD SUCC 125 MG
125 VIAL (EA) INJECTION
Status: COMPLETED | OUTPATIENT
Start: 2021-04-30 | End: 2021-04-30

## 2021-04-30 RX ORDER — LOSARTAN POTASSIUM AND HYDROCHLOROTHIAZIDE 12.5; 1 MG/1; MG/1
1 TABLET ORAL DAILY
Status: DISCONTINUED | OUTPATIENT
Start: 2021-05-01 | End: 2021-05-02 | Stop reason: HOSPADM

## 2021-04-30 RX ORDER — FLUTICASONE FUROATE AND VILANTEROL 100; 25 UG/1; UG/1
1 POWDER RESPIRATORY (INHALATION) DAILY
Status: DISCONTINUED | OUTPATIENT
Start: 2021-05-01 | End: 2021-05-02 | Stop reason: HOSPADM

## 2021-04-30 RX ORDER — ALBUTEROL SULFATE 2.5 MG/.5ML
10 SOLUTION RESPIRATORY (INHALATION)
Status: COMPLETED | OUTPATIENT
Start: 2021-04-30 | End: 2021-04-30

## 2021-04-30 RX ORDER — PREDNISONE 20 MG/1
40 TABLET ORAL DAILY
Status: DISCONTINUED | OUTPATIENT
Start: 2021-05-01 | End: 2021-05-02 | Stop reason: HOSPADM

## 2021-04-30 RX ADMIN — LEVOFLOXACIN 750 MG: 750 TABLET, FILM COATED ORAL at 08:04

## 2021-04-30 RX ADMIN — IPRATROPIUM BROMIDE AND ALBUTEROL SULFATE 3 ML: .5; 2.5 SOLUTION RESPIRATORY (INHALATION) at 08:04

## 2021-04-30 RX ADMIN — ALBUTEROL SULFATE 10 MG: 2.5 SOLUTION RESPIRATORY (INHALATION) at 05:04

## 2021-04-30 RX ADMIN — METHYLPREDNISOLONE SODIUM SUCCINATE 125 MG: 125 INJECTION, POWDER, FOR SOLUTION INTRAMUSCULAR; INTRAVENOUS at 06:04

## 2021-05-01 PROBLEM — Z71.6 ENCOUNTER FOR SMOKING CESSATION COUNSELING: Status: ACTIVE | Noted: 2021-05-01

## 2021-05-01 LAB
ALLENS TEST: ABNORMAL
ANION GAP SERPL CALC-SCNC: 10 MMOL/L (ref 8–16)
BASOPHILS # BLD AUTO: 0.02 K/UL (ref 0–0.2)
BASOPHILS NFR BLD: 0.2 % (ref 0–1.9)
BUN SERPL-MCNC: 14 MG/DL (ref 6–20)
CALCIUM SERPL-MCNC: 9 MG/DL (ref 8.7–10.5)
CHLORIDE SERPL-SCNC: 93 MMOL/L (ref 95–110)
CO2 SERPL-SCNC: 31 MMOL/L (ref 23–29)
CREAT SERPL-MCNC: 0.9 MG/DL (ref 0.5–1.4)
DELSYS: ABNORMAL
DIFFERENTIAL METHOD: ABNORMAL
EOSINOPHIL # BLD AUTO: 0 K/UL (ref 0–0.5)
EOSINOPHIL NFR BLD: 0 % (ref 0–8)
ERYTHROCYTE [DISTWIDTH] IN BLOOD BY AUTOMATED COUNT: 18.8 % (ref 11.5–14.5)
EST. GFR  (AFRICAN AMERICAN): >60 ML/MIN/1.73 M^2
EST. GFR  (NON AFRICAN AMERICAN): >60 ML/MIN/1.73 M^2
FIO2: 28
FLOW: 2
GLUCOSE SERPL-MCNC: 94 MG/DL (ref 70–110)
HCO3 UR-SCNC: 35 MMOL/L (ref 24–28)
HCT VFR BLD AUTO: 42.8 % (ref 40–54)
HGB BLD-MCNC: 13.4 G/DL (ref 14–18)
IMM GRANULOCYTES # BLD AUTO: 0.15 K/UL (ref 0–0.04)
IMM GRANULOCYTES NFR BLD AUTO: 1.3 % (ref 0–0.5)
LYMPHOCYTES # BLD AUTO: 0.5 K/UL (ref 1–4.8)
LYMPHOCYTES NFR BLD: 4.1 % (ref 18–48)
MAGNESIUM SERPL-MCNC: 1.5 MG/DL (ref 1.6–2.6)
MCH RBC QN AUTO: 26 PG (ref 27–31)
MCHC RBC AUTO-ENTMCNC: 31.3 G/DL (ref 32–36)
MCV RBC AUTO: 83 FL (ref 82–98)
MODE: ABNORMAL
MONOCYTES # BLD AUTO: 0.3 K/UL (ref 0.3–1)
MONOCYTES NFR BLD: 2.6 % (ref 4–15)
NEUTROPHILS # BLD AUTO: 10.6 K/UL (ref 1.8–7.7)
NEUTROPHILS NFR BLD: 91.8 % (ref 38–73)
NRBC BLD-RTO: 0 /100 WBC
PCO2 BLDA: 53.1 MMHG (ref 35–45)
PH SMN: 7.43 [PH] (ref 7.35–7.45)
PHOSPHATE SERPL-MCNC: 2.4 MG/DL (ref 2.7–4.5)
PLATELET # BLD AUTO: 316 K/UL (ref 150–450)
PMV BLD AUTO: 10.4 FL (ref 9.2–12.9)
PO2 BLDA: 67 MMHG (ref 80–100)
POC BE: 11 MMOL/L
POC SATURATED O2: 93 % (ref 95–100)
POC TCO2: 37 MMOL/L (ref 23–27)
POTASSIUM SERPL-SCNC: 4 MMOL/L (ref 3.5–5.1)
RBC # BLD AUTO: 5.15 M/UL (ref 4.6–6.2)
SAMPLE: ABNORMAL
SITE: ABNORMAL
SODIUM SERPL-SCNC: 134 MMOL/L (ref 136–145)
WBC # BLD AUTO: 11.53 K/UL (ref 3.9–12.7)

## 2021-05-01 PROCEDURE — 99900035 HC TECH TIME PER 15 MIN (STAT)

## 2021-05-01 PROCEDURE — 80048 BASIC METABOLIC PNL TOTAL CA: CPT | Performed by: NURSE PRACTITIONER

## 2021-05-01 PROCEDURE — 63600175 PHARM REV CODE 636 W HCPCS: Performed by: NURSE PRACTITIONER

## 2021-05-01 PROCEDURE — 94640 AIRWAY INHALATION TREATMENT: CPT

## 2021-05-01 PROCEDURE — 25000242 PHARM REV CODE 250 ALT 637 W/ HCPCS: Performed by: HOSPITALIST

## 2021-05-01 PROCEDURE — 99226 PR SUBSEQUENT OBSERVATION CARE,LEVEL III: CPT | Mod: 25,,, | Performed by: NURSE PRACTITIONER

## 2021-05-01 PROCEDURE — 99226 PR SUBSEQUENT OBSERVATION CARE,LEVEL III: ICD-10-PCS | Mod: 25,,, | Performed by: NURSE PRACTITIONER

## 2021-05-01 PROCEDURE — 94761 N-INVAS EAR/PLS OXIMETRY MLT: CPT

## 2021-05-01 PROCEDURE — G0378 HOSPITAL OBSERVATION PER HR: HCPCS

## 2021-05-01 PROCEDURE — 25000003 PHARM REV CODE 250: Performed by: HOSPITALIST

## 2021-05-01 PROCEDURE — 84100 ASSAY OF PHOSPHORUS: CPT | Performed by: NURSE PRACTITIONER

## 2021-05-01 PROCEDURE — 99407 BEHAV CHNG SMOKING > 10 MIN: CPT | Mod: ,,, | Performed by: NURSE PRACTITIONER

## 2021-05-01 PROCEDURE — 36600 WITHDRAWAL OF ARTERIAL BLOOD: CPT

## 2021-05-01 PROCEDURE — 25000003 PHARM REV CODE 250: Performed by: NURSE PRACTITIONER

## 2021-05-01 PROCEDURE — 82803 BLOOD GASES ANY COMBINATION: CPT

## 2021-05-01 PROCEDURE — S4991 NICOTINE PATCH NONLEGEND: HCPCS | Performed by: NURSE PRACTITIONER

## 2021-05-01 PROCEDURE — 25000003 PHARM REV CODE 250: Performed by: INTERNAL MEDICINE

## 2021-05-01 PROCEDURE — 99407 PR TOBACCO USE CESSATION INTENSIVE >10 MINUTES: ICD-10-PCS | Mod: ,,, | Performed by: NURSE PRACTITIONER

## 2021-05-01 PROCEDURE — 27000221 HC OXYGEN, UP TO 24 HOURS

## 2021-05-01 PROCEDURE — 83735 ASSAY OF MAGNESIUM: CPT | Performed by: NURSE PRACTITIONER

## 2021-05-01 PROCEDURE — 85025 COMPLETE CBC W/AUTO DIFF WBC: CPT | Performed by: NURSE PRACTITIONER

## 2021-05-01 PROCEDURE — 36415 COLL VENOUS BLD VENIPUNCTURE: CPT | Performed by: NURSE PRACTITIONER

## 2021-05-01 PROCEDURE — 96375 TX/PRO/DX INJ NEW DRUG ADDON: CPT

## 2021-05-01 PROCEDURE — 25000003 PHARM REV CODE 250: Performed by: PHYSICIAN ASSISTANT

## 2021-05-01 PROCEDURE — 63600175 PHARM REV CODE 636 W HCPCS: Performed by: HOSPITALIST

## 2021-05-01 RX ORDER — SODIUM,POTASSIUM PHOSPHATES 280-250MG
2 POWDER IN PACKET (EA) ORAL ONCE
Status: COMPLETED | OUTPATIENT
Start: 2021-05-01 | End: 2021-05-01

## 2021-05-01 RX ORDER — BISACODYL 10 MG
10 SUPPOSITORY, RECTAL RECTAL DAILY PRN
Status: DISCONTINUED | OUTPATIENT
Start: 2021-05-01 | End: 2021-05-02 | Stop reason: HOSPADM

## 2021-05-01 RX ORDER — CALCIUM CARBONATE 200(500)MG
1000 TABLET,CHEWABLE ORAL 3 TIMES DAILY PRN
Status: DISCONTINUED | OUTPATIENT
Start: 2021-05-01 | End: 2021-05-02 | Stop reason: HOSPADM

## 2021-05-01 RX ORDER — IPRATROPIUM BROMIDE AND ALBUTEROL SULFATE 2.5; .5 MG/3ML; MG/3ML
3 SOLUTION RESPIRATORY (INHALATION) EVERY 4 HOURS PRN
Status: DISCONTINUED | OUTPATIENT
Start: 2021-05-01 | End: 2021-05-02 | Stop reason: HOSPADM

## 2021-05-01 RX ORDER — SODIUM,POTASSIUM PHOSPHATES 280-250MG
2 POWDER IN PACKET (EA) ORAL ONCE
Status: DISCONTINUED | OUTPATIENT
Start: 2021-05-01 | End: 2021-05-01

## 2021-05-01 RX ORDER — IPRATROPIUM BROMIDE AND ALBUTEROL SULFATE 2.5; .5 MG/3ML; MG/3ML
SOLUTION RESPIRATORY (INHALATION)
Status: COMPLETED
Start: 2021-05-01 | End: 2021-05-02

## 2021-05-01 RX ORDER — IBUPROFEN 200 MG
1 TABLET ORAL DAILY
Status: DISCONTINUED | OUTPATIENT
Start: 2021-05-01 | End: 2021-05-02 | Stop reason: HOSPADM

## 2021-05-01 RX ORDER — MAGNESIUM SULFATE HEPTAHYDRATE 40 MG/ML
2 INJECTION, SOLUTION INTRAVENOUS
Status: COMPLETED | OUTPATIENT
Start: 2021-05-01 | End: 2021-05-01

## 2021-05-01 RX ORDER — GLUCAGON 1 MG
1 KIT INJECTION
Status: DISCONTINUED | OUTPATIENT
Start: 2021-05-01 | End: 2021-05-02 | Stop reason: HOSPADM

## 2021-05-01 RX ORDER — POLYETHYLENE GLYCOL 3350 17 G/17G
17 POWDER, FOR SOLUTION ORAL DAILY
Status: DISCONTINUED | OUTPATIENT
Start: 2021-05-01 | End: 2021-05-02 | Stop reason: HOSPADM

## 2021-05-01 RX ORDER — TALC
6 POWDER (GRAM) TOPICAL NIGHTLY PRN
Status: DISCONTINUED | OUTPATIENT
Start: 2021-05-01 | End: 2021-05-02 | Stop reason: HOSPADM

## 2021-05-01 RX ORDER — AMLODIPINE BESYLATE 5 MG/1
5 TABLET ORAL DAILY
Status: DISCONTINUED | OUTPATIENT
Start: 2021-05-01 | End: 2021-05-02 | Stop reason: HOSPADM

## 2021-05-01 RX ORDER — IBUPROFEN 200 MG
24 TABLET ORAL
Status: DISCONTINUED | OUTPATIENT
Start: 2021-05-01 | End: 2021-05-02 | Stop reason: HOSPADM

## 2021-05-01 RX ORDER — FAMOTIDINE 20 MG/1
20 TABLET, FILM COATED ORAL 2 TIMES DAILY
Status: DISCONTINUED | OUTPATIENT
Start: 2021-05-01 | End: 2021-05-02 | Stop reason: HOSPADM

## 2021-05-01 RX ORDER — ONDANSETRON 8 MG/1
8 TABLET, ORALLY DISINTEGRATING ORAL EVERY 8 HOURS PRN
Status: DISCONTINUED | OUTPATIENT
Start: 2021-05-01 | End: 2021-05-02 | Stop reason: HOSPADM

## 2021-05-01 RX ORDER — IBUPROFEN 200 MG
16 TABLET ORAL
Status: DISCONTINUED | OUTPATIENT
Start: 2021-05-01 | End: 2021-05-02 | Stop reason: HOSPADM

## 2021-05-01 RX ORDER — ACETAMINOPHEN 325 MG/1
650 TABLET ORAL EVERY 4 HOURS PRN
Status: DISCONTINUED | OUTPATIENT
Start: 2021-05-01 | End: 2021-05-02 | Stop reason: HOSPADM

## 2021-05-01 RX ADMIN — LOSARTAN POTASSIUM AND HYDROCHLOROTHIAZIDE 1 TABLET: 12.5; 1 TABLET ORAL at 12:05

## 2021-05-01 RX ADMIN — FAMOTIDINE 20 MG: 20 TABLET, FILM COATED ORAL at 10:05

## 2021-05-01 RX ADMIN — LEVOFLOXACIN 750 MG: 750 TABLET, FILM COATED ORAL at 09:05

## 2021-05-01 RX ADMIN — IPRATROPIUM BROMIDE AND ALBUTEROL SULFATE 3 ML: .5; 2.5 SOLUTION RESPIRATORY (INHALATION) at 12:05

## 2021-05-01 RX ADMIN — IPRATROPIUM BROMIDE AND ALBUTEROL SULFATE 3 ML: .5; 2.5 SOLUTION RESPIRATORY (INHALATION) at 07:05

## 2021-05-01 RX ADMIN — NICOTINE 1 PATCH: 21 PATCH, EXTENDED RELEASE TRANSDERMAL at 09:05

## 2021-05-01 RX ADMIN — POTASSIUM & SODIUM PHOSPHATES POWDER PACK 280-160-250 MG 2 PACKET: 280-160-250 PACK at 03:05

## 2021-05-01 RX ADMIN — POLYETHYLENE GLYCOL 3350 17 G: 17 POWDER, FOR SOLUTION ORAL at 09:05

## 2021-05-01 RX ADMIN — MAGNESIUM SULFATE 2 G: 2 INJECTION INTRAVENOUS at 03:05

## 2021-05-01 RX ADMIN — IPRATROPIUM BROMIDE AND ALBUTEROL SULFATE 3 ML: .5; 2.5 SOLUTION RESPIRATORY (INHALATION) at 08:05

## 2021-05-01 RX ADMIN — AMLODIPINE BESYLATE 5 MG: 5 TABLET ORAL at 10:05

## 2021-05-01 RX ADMIN — PREDNISONE 40 MG: 20 TABLET ORAL at 09:05

## 2021-05-01 RX ADMIN — FLUTICASONE FUROATE AND VILANTEROL TRIFENATATE 1 PUFF: 100; 25 POWDER RESPIRATORY (INHALATION) at 07:05

## 2021-05-01 RX ADMIN — IPRATROPIUM BROMIDE AND ALBUTEROL SULFATE 3 ML: .5; 2.5 SOLUTION RESPIRATORY (INHALATION) at 05:05

## 2021-05-01 RX ADMIN — POTASSIUM & SODIUM PHOSPHATES POWDER PACK 280-160-250 MG 2 PACKET: 280-160-250 PACK at 10:05

## 2021-05-02 VITALS
BODY MASS INDEX: 20.04 KG/M2 | OXYGEN SATURATION: 91 % | TEMPERATURE: 98 F | HEIGHT: 70 IN | SYSTOLIC BLOOD PRESSURE: 149 MMHG | HEART RATE: 91 BPM | RESPIRATION RATE: 18 BRPM | WEIGHT: 140 LBS | DIASTOLIC BLOOD PRESSURE: 88 MMHG

## 2021-05-02 LAB
ANION GAP SERPL CALC-SCNC: 15 MMOL/L (ref 8–16)
BUN SERPL-MCNC: 14 MG/DL (ref 6–20)
CALCIUM SERPL-MCNC: 9.5 MG/DL (ref 8.7–10.5)
CHLORIDE SERPL-SCNC: 96 MMOL/L (ref 95–110)
CO2 SERPL-SCNC: 28 MMOL/L (ref 23–29)
CREAT SERPL-MCNC: 0.9 MG/DL (ref 0.5–1.4)
EST. GFR  (AFRICAN AMERICAN): >60 ML/MIN/1.73 M^2
EST. GFR  (NON AFRICAN AMERICAN): >60 ML/MIN/1.73 M^2
GLUCOSE SERPL-MCNC: 95 MG/DL (ref 70–110)
MAGNESIUM SERPL-MCNC: 2 MG/DL (ref 1.6–2.6)
PHOSPHATE SERPL-MCNC: 4.7 MG/DL (ref 2.7–4.5)
POTASSIUM SERPL-SCNC: 4.1 MMOL/L (ref 3.5–5.1)
SODIUM SERPL-SCNC: 139 MMOL/L (ref 136–145)

## 2021-05-02 PROCEDURE — 94761 N-INVAS EAR/PLS OXIMETRY MLT: CPT

## 2021-05-02 PROCEDURE — 25000242 PHARM REV CODE 250 ALT 637 W/ HCPCS

## 2021-05-02 PROCEDURE — 80048 BASIC METABOLIC PNL TOTAL CA: CPT | Performed by: NURSE PRACTITIONER

## 2021-05-02 PROCEDURE — 25000003 PHARM REV CODE 250: Performed by: PHYSICIAN ASSISTANT

## 2021-05-02 PROCEDURE — 99225 PR SUBSEQUENT OBSERVATION CARE,LEVEL II: CPT | Mod: ,,, | Performed by: NURSE PRACTITIONER

## 2021-05-02 PROCEDURE — 36415 COLL VENOUS BLD VENIPUNCTURE: CPT | Performed by: NURSE PRACTITIONER

## 2021-05-02 PROCEDURE — 94640 AIRWAY INHALATION TREATMENT: CPT

## 2021-05-02 PROCEDURE — G0378 HOSPITAL OBSERVATION PER HR: HCPCS

## 2021-05-02 PROCEDURE — 99225 PR SUBSEQUENT OBSERVATION CARE,LEVEL II: ICD-10-PCS | Mod: ,,, | Performed by: NURSE PRACTITIONER

## 2021-05-02 PROCEDURE — 84100 ASSAY OF PHOSPHORUS: CPT | Performed by: NURSE PRACTITIONER

## 2021-05-02 PROCEDURE — 25000003 PHARM REV CODE 250: Performed by: HOSPITALIST

## 2021-05-02 PROCEDURE — 83735 ASSAY OF MAGNESIUM: CPT | Performed by: NURSE PRACTITIONER

## 2021-05-02 PROCEDURE — 27000221 HC OXYGEN, UP TO 24 HOURS

## 2021-05-02 PROCEDURE — S4991 NICOTINE PATCH NONLEGEND: HCPCS | Performed by: NURSE PRACTITIONER

## 2021-05-02 PROCEDURE — 63600175 PHARM REV CODE 636 W HCPCS: Performed by: HOSPITALIST

## 2021-05-02 PROCEDURE — 25000242 PHARM REV CODE 250 ALT 637 W/ HCPCS: Performed by: HOSPITALIST

## 2021-05-02 PROCEDURE — 25000003 PHARM REV CODE 250: Performed by: NURSE PRACTITIONER

## 2021-05-02 RX ORDER — PREDNISONE 20 MG/1
40 TABLET ORAL DAILY
Qty: 6 TABLET | Refills: 0 | Status: SHIPPED | OUTPATIENT
Start: 2021-05-03 | End: 2021-05-06

## 2021-05-02 RX ORDER — AMLODIPINE BESYLATE 5 MG/1
5 TABLET ORAL DAILY
Qty: 30 TABLET | Refills: 0 | Status: ON HOLD | OUTPATIENT
Start: 2021-05-03 | End: 2021-06-10 | Stop reason: HOSPADM

## 2021-05-02 RX ORDER — LEVOFLOXACIN 750 MG/1
750 TABLET ORAL DAILY
Qty: 2 TABLET | Refills: 0 | Status: SHIPPED | OUTPATIENT
Start: 2021-05-03 | End: 2021-05-05

## 2021-05-02 RX ADMIN — IPRATROPIUM BROMIDE AND ALBUTEROL SULFATE 3 ML: .5; 2.5 SOLUTION RESPIRATORY (INHALATION) at 07:05

## 2021-05-02 RX ADMIN — FAMOTIDINE 20 MG: 20 TABLET, FILM COATED ORAL at 10:05

## 2021-05-02 RX ADMIN — LEVOFLOXACIN 750 MG: 750 TABLET, FILM COATED ORAL at 10:05

## 2021-05-02 RX ADMIN — FLUTICASONE FUROATE AND VILANTEROL TRIFENATATE 1 PUFF: 100; 25 POWDER RESPIRATORY (INHALATION) at 07:05

## 2021-05-02 RX ADMIN — POLYETHYLENE GLYCOL 3350 17 G: 17 POWDER, FOR SOLUTION ORAL at 10:05

## 2021-05-02 RX ADMIN — NICOTINE 1 PATCH: 21 PATCH, EXTENDED RELEASE TRANSDERMAL at 10:05

## 2021-05-02 RX ADMIN — LOSARTAN POTASSIUM AND HYDROCHLOROTHIAZIDE 1 TABLET: 12.5; 1 TABLET ORAL at 12:05

## 2021-05-02 RX ADMIN — AMLODIPINE BESYLATE 5 MG: 5 TABLET ORAL at 10:05

## 2021-05-02 RX ADMIN — IPRATROPIUM BROMIDE AND ALBUTEROL SULFATE 3 ML: .5; 2.5 SOLUTION RESPIRATORY (INHALATION) at 12:05

## 2021-05-02 RX ADMIN — PREDNISONE 40 MG: 20 TABLET ORAL at 10:05

## 2021-05-10 ENCOUNTER — TELEPHONE (OUTPATIENT)
Dept: PULMONOLOGY | Facility: CLINIC | Age: 61
End: 2021-05-10

## 2021-05-11 ENCOUNTER — HOSPITAL ENCOUNTER (OUTPATIENT)
Dept: RADIOLOGY | Facility: HOSPITAL | Age: 61
Discharge: HOME | End: 2021-05-11
Attending: NURSE PRACTITIONER
Payer: MEDICAID

## 2021-05-11 DIAGNOSIS — R93.89 ABNORMAL CHEST CT: ICD-10-CM

## 2021-05-11 PROCEDURE — 71250 CT THORAX DX C-: CPT | Mod: 26,,, | Performed by: RADIOLOGY

## 2021-05-11 PROCEDURE — 71250 CT THORAX DX C-: CPT | Mod: TC

## 2021-05-11 PROCEDURE — 71250 CT CHEST WITHOUT CONTRAST: ICD-10-PCS | Mod: 26,,, | Performed by: RADIOLOGY

## 2021-05-12 ENCOUNTER — HOSPITAL ENCOUNTER (OUTPATIENT)
Facility: HOSPITAL | Age: 61
Discharge: HOME | End: 2021-05-13
Attending: EMERGENCY MEDICINE | Admitting: INTERNAL MEDICINE
Payer: MEDICAID

## 2021-05-12 DIAGNOSIS — I25.10 NONOBSTRUCTIVE ATHEROSCLEROSIS OF CORONARY ARTERY: ICD-10-CM

## 2021-05-12 DIAGNOSIS — R06.02 SHORTNESS OF BREATH: ICD-10-CM

## 2021-05-12 DIAGNOSIS — I50.42 CHRONIC COMBINED SYSTOLIC AND DIASTOLIC CONGESTIVE HEART FAILURE: ICD-10-CM

## 2021-05-12 DIAGNOSIS — Z71.6 ENCOUNTER FOR SMOKING CESSATION COUNSELING: ICD-10-CM

## 2021-05-12 DIAGNOSIS — F17.210 CIGARETTE NICOTINE DEPENDENCE WITHOUT COMPLICATION: ICD-10-CM

## 2021-05-12 DIAGNOSIS — R06.02 SOB (SHORTNESS OF BREATH): ICD-10-CM

## 2021-05-12 DIAGNOSIS — J44.1 COPD EXACERBATION: ICD-10-CM

## 2021-05-12 DIAGNOSIS — J96.21 ACUTE ON CHRONIC RESPIRATORY FAILURE WITH HYPOXIA: Primary | ICD-10-CM

## 2021-05-12 DIAGNOSIS — E87.5 HYPERKALEMIA: ICD-10-CM

## 2021-05-12 LAB
ALBUMIN SERPL BCP-MCNC: 3.3 G/DL (ref 3.5–5.2)
ALP SERPL-CCNC: 91 U/L (ref 55–135)
ALT SERPL W/O P-5'-P-CCNC: 11 U/L (ref 10–44)
ANION GAP SERPL CALC-SCNC: 11 MMOL/L (ref 8–16)
AST SERPL-CCNC: 18 U/L (ref 10–40)
BASOPHILS # BLD AUTO: 0.04 K/UL (ref 0–0.2)
BASOPHILS NFR BLD: 0.4 % (ref 0–1.9)
BILIRUB SERPL-MCNC: 0.5 MG/DL (ref 0.1–1)
BNP SERPL-MCNC: 170 PG/ML (ref 0–99)
BUN SERPL-MCNC: 10 MG/DL (ref 6–20)
BUN SERPL-MCNC: 10 MG/DL (ref 6–30)
CALCIUM SERPL-MCNC: 9 MG/DL (ref 8.7–10.5)
CHLORIDE SERPL-SCNC: 98 MMOL/L (ref 95–110)
CHLORIDE SERPL-SCNC: 99 MMOL/L (ref 95–110)
CO2 SERPL-SCNC: 26 MMOL/L (ref 23–29)
CREAT SERPL-MCNC: 0.9 MG/DL (ref 0.5–1.4)
CREAT SERPL-MCNC: 1.2 MG/DL (ref 0.5–1.4)
CTP QC/QA: YES
DIFFERENTIAL METHOD: ABNORMAL
EOSINOPHIL # BLD AUTO: 0.2 K/UL (ref 0–0.5)
EOSINOPHIL NFR BLD: 2.2 % (ref 0–8)
ERYTHROCYTE [DISTWIDTH] IN BLOOD BY AUTOMATED COUNT: 19.9 % (ref 11.5–14.5)
EST. GFR  (AFRICAN AMERICAN): >60 ML/MIN/1.73 M^2
EST. GFR  (NON AFRICAN AMERICAN): >60 ML/MIN/1.73 M^2
GLUCOSE SERPL-MCNC: 84 MG/DL (ref 70–110)
GLUCOSE SERPL-MCNC: 86 MG/DL (ref 70–110)
HCT VFR BLD AUTO: 45.5 % (ref 40–54)
HCT VFR BLD CALC: 48 %PCV (ref 36–54)
HGB BLD-MCNC: 14.2 G/DL (ref 14–18)
IMM GRANULOCYTES # BLD AUTO: 0.07 K/UL (ref 0–0.04)
IMM GRANULOCYTES NFR BLD AUTO: 0.7 % (ref 0–0.5)
LYMPHOCYTES # BLD AUTO: 2.1 K/UL (ref 1–4.8)
LYMPHOCYTES NFR BLD: 21.1 % (ref 18–48)
MCH RBC QN AUTO: 26.5 PG (ref 27–31)
MCHC RBC AUTO-ENTMCNC: 31.2 G/DL (ref 32–36)
MCV RBC AUTO: 85 FL (ref 82–98)
MONOCYTES # BLD AUTO: 0.5 K/UL (ref 0.3–1)
MONOCYTES NFR BLD: 5.4 % (ref 4–15)
NEUTROPHILS # BLD AUTO: 6.9 K/UL (ref 1.8–7.7)
NEUTROPHILS NFR BLD: 70.2 % (ref 38–73)
NRBC BLD-RTO: 0 /100 WBC
PLATELET # BLD AUTO: 283 K/UL (ref 150–450)
PMV BLD AUTO: 9.9 FL (ref 9.2–12.9)
POC IONIZED CALCIUM: 1.09 MMOL/L (ref 1.06–1.42)
POC TCO2 (MEASURED): 30 MMOL/L (ref 23–29)
POTASSIUM BLD-SCNC: 4 MMOL/L (ref 3.5–5.1)
POTASSIUM SERPL-SCNC: 4.1 MMOL/L (ref 3.5–5.1)
PROT SERPL-MCNC: 7.4 G/DL (ref 6–8.4)
RBC # BLD AUTO: 5.35 M/UL (ref 4.6–6.2)
SAMPLE: ABNORMAL
SARS-COV-2 RDRP RESP QL NAA+PROBE: NEGATIVE
SODIUM BLD-SCNC: 136 MMOL/L (ref 136–145)
SODIUM SERPL-SCNC: 136 MMOL/L (ref 136–145)
TROPONIN I SERPL DL<=0.01 NG/ML-MCNC: 0.02 NG/ML (ref 0–0.03)
WBC # BLD AUTO: 9.85 K/UL (ref 3.9–12.7)

## 2021-05-12 PROCEDURE — 25000242 PHARM REV CODE 250 ALT 637 W/ HCPCS: Performed by: PHYSICIAN ASSISTANT

## 2021-05-12 PROCEDURE — U0002 COVID-19 LAB TEST NON-CDC: HCPCS | Performed by: EMERGENCY MEDICINE

## 2021-05-12 PROCEDURE — 85025 COMPLETE CBC W/AUTO DIFF WBC: CPT | Performed by: STUDENT IN AN ORGANIZED HEALTH CARE EDUCATION/TRAINING PROGRAM

## 2021-05-12 PROCEDURE — 99220 PR INITIAL OBSERVATION CARE,LEVL III: CPT | Mod: ,,, | Performed by: PHYSICIAN ASSISTANT

## 2021-05-12 PROCEDURE — 99220 PR INITIAL OBSERVATION CARE,LEVL III: ICD-10-PCS | Mod: ,,, | Performed by: PHYSICIAN ASSISTANT

## 2021-05-12 PROCEDURE — 93010 ELECTROCARDIOGRAM REPORT: CPT | Mod: ,,, | Performed by: INTERNAL MEDICINE

## 2021-05-12 PROCEDURE — 25000242 PHARM REV CODE 250 ALT 637 W/ HCPCS: Performed by: STUDENT IN AN ORGANIZED HEALTH CARE EDUCATION/TRAINING PROGRAM

## 2021-05-12 PROCEDURE — 96374 THER/PROPH/DIAG INJ IV PUSH: CPT

## 2021-05-12 PROCEDURE — 82330 ASSAY OF CALCIUM: CPT

## 2021-05-12 PROCEDURE — G0378 HOSPITAL OBSERVATION PER HR: HCPCS

## 2021-05-12 PROCEDURE — 99291 CRITICAL CARE FIRST HOUR: CPT | Mod: 25

## 2021-05-12 PROCEDURE — 96375 TX/PRO/DX INJ NEW DRUG ADDON: CPT

## 2021-05-12 PROCEDURE — 25000003 PHARM REV CODE 250: Performed by: PHYSICIAN ASSISTANT

## 2021-05-12 PROCEDURE — 83880 ASSAY OF NATRIURETIC PEPTIDE: CPT | Performed by: STUDENT IN AN ORGANIZED HEALTH CARE EDUCATION/TRAINING PROGRAM

## 2021-05-12 PROCEDURE — 63600175 PHARM REV CODE 636 W HCPCS: Performed by: STUDENT IN AN ORGANIZED HEALTH CARE EDUCATION/TRAINING PROGRAM

## 2021-05-12 PROCEDURE — 99291 PR CRITICAL CARE, E/M 30-74 MINUTES: ICD-10-PCS | Mod: CS,,, | Performed by: EMERGENCY MEDICINE

## 2021-05-12 PROCEDURE — 99291 CRITICAL CARE FIRST HOUR: CPT | Mod: CS,,, | Performed by: EMERGENCY MEDICINE

## 2021-05-12 PROCEDURE — 84484 ASSAY OF TROPONIN QUANT: CPT | Performed by: STUDENT IN AN ORGANIZED HEALTH CARE EDUCATION/TRAINING PROGRAM

## 2021-05-12 PROCEDURE — 93010 EKG 12-LEAD: ICD-10-PCS | Mod: ,,, | Performed by: INTERNAL MEDICINE

## 2021-05-12 PROCEDURE — 63600175 PHARM REV CODE 636 W HCPCS: Performed by: PHYSICIAN ASSISTANT

## 2021-05-12 PROCEDURE — 93005 ELECTROCARDIOGRAM TRACING: CPT

## 2021-05-12 PROCEDURE — 94640 AIRWAY INHALATION TREATMENT: CPT

## 2021-05-12 PROCEDURE — 80053 COMPREHEN METABOLIC PANEL: CPT | Performed by: STUDENT IN AN ORGANIZED HEALTH CARE EDUCATION/TRAINING PROGRAM

## 2021-05-12 PROCEDURE — 80047 BASIC METABLC PNL IONIZED CA: CPT

## 2021-05-12 RX ORDER — IPRATROPIUM BROMIDE AND ALBUTEROL SULFATE 2.5; .5 MG/3ML; MG/3ML
3 SOLUTION RESPIRATORY (INHALATION)
Status: DISCONTINUED | OUTPATIENT
Start: 2021-05-12 | End: 2021-05-13 | Stop reason: HOSPADM

## 2021-05-12 RX ORDER — PREDNISONE 20 MG/1
40 TABLET ORAL DAILY
Status: DISCONTINUED | OUTPATIENT
Start: 2021-05-13 | End: 2021-05-13 | Stop reason: HOSPADM

## 2021-05-12 RX ORDER — LIDOCAINE 50 MG/G
3 PATCH TOPICAL DAILY PRN
Status: DISCONTINUED | OUTPATIENT
Start: 2021-05-12 | End: 2021-05-13 | Stop reason: HOSPADM

## 2021-05-12 RX ORDER — METHYLPREDNISOLONE SOD SUCC 125 MG
125 VIAL (EA) INJECTION
Status: COMPLETED | OUTPATIENT
Start: 2021-05-12 | End: 2021-05-12

## 2021-05-12 RX ORDER — TALC
6 POWDER (GRAM) TOPICAL NIGHTLY PRN
Status: DISCONTINUED | OUTPATIENT
Start: 2021-05-12 | End: 2021-05-13 | Stop reason: HOSPADM

## 2021-05-12 RX ORDER — IPRATROPIUM BROMIDE AND ALBUTEROL SULFATE 2.5; .5 MG/3ML; MG/3ML
3 SOLUTION RESPIRATORY (INHALATION)
Status: COMPLETED | OUTPATIENT
Start: 2021-05-12 | End: 2021-05-12

## 2021-05-12 RX ORDER — AMLODIPINE BESYLATE 5 MG/1
5 TABLET ORAL DAILY
Status: DISCONTINUED | OUTPATIENT
Start: 2021-05-13 | End: 2021-05-13 | Stop reason: HOSPADM

## 2021-05-12 RX ORDER — FLUTICASONE FUROATE AND VILANTEROL 100; 25 UG/1; UG/1
1 POWDER RESPIRATORY (INHALATION) DAILY
Status: DISCONTINUED | OUTPATIENT
Start: 2021-05-13 | End: 2021-05-13 | Stop reason: HOSPADM

## 2021-05-12 RX ORDER — FUROSEMIDE 10 MG/ML
20 INJECTION INTRAMUSCULAR; INTRAVENOUS ONCE
Status: COMPLETED | OUTPATIENT
Start: 2021-05-12 | End: 2021-05-12

## 2021-05-12 RX ORDER — ATORVASTATIN CALCIUM 40 MG/1
40 TABLET, FILM COATED ORAL DAILY
Status: DISCONTINUED | OUTPATIENT
Start: 2021-05-13 | End: 2021-05-13 | Stop reason: HOSPADM

## 2021-05-12 RX ORDER — LOSARTAN POTASSIUM AND HYDROCHLOROTHIAZIDE 12.5; 1 MG/1; MG/1
1 TABLET ORAL DAILY
Status: DISCONTINUED | OUTPATIENT
Start: 2021-05-13 | End: 2021-05-13 | Stop reason: HOSPADM

## 2021-05-12 RX ORDER — ASPIRIN 81 MG/1
81 TABLET ORAL DAILY
Status: DISCONTINUED | OUTPATIENT
Start: 2021-05-13 | End: 2021-05-13 | Stop reason: HOSPADM

## 2021-05-12 RX ORDER — ONDANSETRON 8 MG/1
8 TABLET, ORALLY DISINTEGRATING ORAL EVERY 8 HOURS PRN
Status: DISCONTINUED | OUTPATIENT
Start: 2021-05-12 | End: 2021-05-13 | Stop reason: HOSPADM

## 2021-05-12 RX ORDER — SODIUM CHLORIDE 0.9 % (FLUSH) 0.9 %
3 SYRINGE (ML) INJECTION
Status: DISCONTINUED | OUTPATIENT
Start: 2021-05-12 | End: 2021-05-13 | Stop reason: HOSPADM

## 2021-05-12 RX ORDER — POLYETHYLENE GLYCOL 3350 17 G/17G
17 POWDER, FOR SOLUTION ORAL 2 TIMES DAILY PRN
Status: DISCONTINUED | OUTPATIENT
Start: 2021-05-12 | End: 2021-05-13 | Stop reason: HOSPADM

## 2021-05-12 RX ORDER — ACETAMINOPHEN 325 MG/1
650 TABLET ORAL EVERY 6 HOURS PRN
Status: DISCONTINUED | OUTPATIENT
Start: 2021-05-12 | End: 2021-05-13 | Stop reason: HOSPADM

## 2021-05-12 RX ADMIN — IPRATROPIUM BROMIDE AND ALBUTEROL SULFATE 3 ML: .5; 2.5 SOLUTION RESPIRATORY (INHALATION) at 06:05

## 2021-05-12 RX ADMIN — ACETAMINOPHEN 650 MG: 325 TABLET ORAL at 10:05

## 2021-05-12 RX ADMIN — METHYLPREDNISOLONE SODIUM SUCCINATE 125 MG: 125 INJECTION, POWDER, FOR SOLUTION INTRAMUSCULAR; INTRAVENOUS at 06:05

## 2021-05-12 RX ADMIN — LIDOCAINE 3 PATCH: 50 PATCH TOPICAL at 10:05

## 2021-05-12 RX ADMIN — IPRATROPIUM BROMIDE AND ALBUTEROL SULFATE 3 ML: .5; 2.5 SOLUTION RESPIRATORY (INHALATION) at 03:05

## 2021-05-12 RX ADMIN — FUROSEMIDE 20 MG: 10 INJECTION, SOLUTION INTRAMUSCULAR; INTRAVENOUS at 07:05

## 2021-05-12 RX ADMIN — IPRATROPIUM BROMIDE AND ALBUTEROL SULFATE 3 ML: .5; 2.5 SOLUTION RESPIRATORY (INHALATION) at 07:05

## 2021-05-12 RX ADMIN — IPRATROPIUM BROMIDE AND ALBUTEROL SULFATE 3 ML: .5; 2.5 SOLUTION RESPIRATORY (INHALATION) at 04:05

## 2021-05-13 VITALS
OXYGEN SATURATION: 91 % | WEIGHT: 181 LBS | SYSTOLIC BLOOD PRESSURE: 148 MMHG | TEMPERATURE: 98 F | BODY MASS INDEX: 25.91 KG/M2 | DIASTOLIC BLOOD PRESSURE: 68 MMHG | HEIGHT: 70 IN | HEART RATE: 83 BPM | RESPIRATION RATE: 20 BRPM

## 2021-05-13 LAB
ANION GAP SERPL CALC-SCNC: 10 MMOL/L (ref 8–16)
ANION GAP SERPL CALC-SCNC: 9 MMOL/L (ref 8–16)
AV INDEX (PROSTH): 0.9
AV MEAN GRADIENT: 4 MMHG
AV PEAK GRADIENT: 8 MMHG
AV VALVE AREA: 3.01 CM2
AV VELOCITY RATIO: 0.81
BASOPHILS # BLD AUTO: 0 K/UL (ref 0–0.2)
BASOPHILS NFR BLD: 0 % (ref 0–1.9)
BSA FOR ECHO PROCEDURE: 2.01 M2
BUN SERPL-MCNC: 15 MG/DL (ref 6–20)
BUN SERPL-MCNC: 15 MG/DL (ref 6–20)
CALCIUM SERPL-MCNC: 9.6 MG/DL (ref 8.7–10.5)
CALCIUM SERPL-MCNC: 9.8 MG/DL (ref 8.7–10.5)
CHLORIDE SERPL-SCNC: 97 MMOL/L (ref 95–110)
CHLORIDE SERPL-SCNC: 98 MMOL/L (ref 95–110)
CO2 SERPL-SCNC: 29 MMOL/L (ref 23–29)
CO2 SERPL-SCNC: 31 MMOL/L (ref 23–29)
CREAT SERPL-MCNC: 0.8 MG/DL (ref 0.5–1.4)
CREAT SERPL-MCNC: 0.9 MG/DL (ref 0.5–1.4)
CV ECHO LV RWT: 0.29 CM
DIFFERENTIAL METHOD: ABNORMAL
DOP CALC AO PEAK VEL: 1.38 M/S
DOP CALC AO VTI: 28.25 CM
DOP CALC LVOT AREA: 3.4 CM2
DOP CALC LVOT DIAMETER: 2.07 CM
DOP CALC LVOT PEAK VEL: 1.12 M/S
DOP CALC LVOT STROKE VOLUME: 85.13 CM3
DOP CALCLVOT PEAK VEL VTI: 25.31 CM
E WAVE DECELERATION TIME: 150.48 MSEC
E/A RATIO: 1.08
E/E' RATIO: 7 M/S
ECHO LV POSTERIOR WALL: 0.7 CM (ref 0.6–1.1)
EJECTION FRACTION: 55 %
EOSINOPHIL # BLD AUTO: 0 K/UL (ref 0–0.5)
EOSINOPHIL NFR BLD: 0 % (ref 0–8)
ERYTHROCYTE [DISTWIDTH] IN BLOOD BY AUTOMATED COUNT: 19.1 % (ref 11.5–14.5)
EST. GFR  (AFRICAN AMERICAN): >60 ML/MIN/1.73 M^2
EST. GFR  (AFRICAN AMERICAN): >60 ML/MIN/1.73 M^2
EST. GFR  (NON AFRICAN AMERICAN): >60 ML/MIN/1.73 M^2
EST. GFR  (NON AFRICAN AMERICAN): >60 ML/MIN/1.73 M^2
FRACTIONAL SHORTENING: 34 % (ref 28–44)
GLUCOSE SERPL-MCNC: 103 MG/DL (ref 70–110)
GLUCOSE SERPL-MCNC: 163 MG/DL (ref 70–110)
HCT VFR BLD AUTO: 42 % (ref 40–54)
HGB BLD-MCNC: 13.5 G/DL (ref 14–18)
IMM GRANULOCYTES # BLD AUTO: 0.03 K/UL (ref 0–0.04)
IMM GRANULOCYTES NFR BLD AUTO: 0.5 % (ref 0–0.5)
INTERVENTRICULAR SEPTUM: 0.64 CM (ref 0.6–1.1)
LA MAJOR: 4.35 CM
LA WIDTH: 3.68 CM
LEFT ATRIUM SIZE: 2.17 CM
LEFT ATRIUM VOLUME INDEX MOD: 23.9 ML/M2
LEFT ATRIUM VOLUME MOD: 47.85 CM3
LEFT INTERNAL DIMENSION IN SYSTOLE: 3.13 CM (ref 2.1–4)
LEFT VENTRICLE DIASTOLIC VOLUME INDEX: 52.67 ML/M2
LEFT VENTRICLE DIASTOLIC VOLUME: 105.34 ML
LEFT VENTRICLE MASS INDEX: 50 G/M2
LEFT VENTRICLE SYSTOLIC VOLUME INDEX: 19.4 ML/M2
LEFT VENTRICLE SYSTOLIC VOLUME: 38.83 ML
LEFT VENTRICULAR INTERNAL DIMENSION IN DIASTOLE: 4.76 CM (ref 3.5–6)
LEFT VENTRICULAR MASS: 99.72 G
LV LATERAL E/E' RATIO: 6.36 M/S
LV SEPTAL E/E' RATIO: 7.78 M/S
LYMPHOCYTES # BLD AUTO: 0.4 K/UL (ref 1–4.8)
LYMPHOCYTES NFR BLD: 6.9 % (ref 18–48)
MAGNESIUM SERPL-MCNC: 1.9 MG/DL (ref 1.6–2.6)
MCH RBC QN AUTO: 26.8 PG (ref 27–31)
MCHC RBC AUTO-ENTMCNC: 32.1 G/DL (ref 32–36)
MCV RBC AUTO: 83 FL (ref 82–98)
MONOCYTES # BLD AUTO: 0.1 K/UL (ref 0.3–1)
MONOCYTES NFR BLD: 0.9 % (ref 4–15)
MV PEAK A VEL: 0.65 M/S
MV PEAK E VEL: 0.7 M/S
MV STENOSIS PRESSURE HALF TIME: 43.64 MS
MV VALVE AREA P 1/2 METHOD: 5.04 CM2
NEUTROPHILS # BLD AUTO: 5.3 K/UL (ref 1.8–7.7)
NEUTROPHILS NFR BLD: 91.7 % (ref 38–73)
NRBC BLD-RTO: 0 /100 WBC
PHOSPHATE SERPL-MCNC: 3.6 MG/DL (ref 2.7–4.5)
PLATELET # BLD AUTO: 295 K/UL (ref 150–450)
PMV BLD AUTO: 9.8 FL (ref 9.2–12.9)
POTASSIUM SERPL-SCNC: 4 MMOL/L (ref 3.5–5.1)
POTASSIUM SERPL-SCNC: 5.3 MMOL/L (ref 3.5–5.1)
RA MAJOR: 4.55 CM
RA PRESSURE: 3 MMHG
RA WIDTH: 3.03 CM
RBC # BLD AUTO: 5.04 M/UL (ref 4.6–6.2)
RIGHT VENTRICULAR END-DIASTOLIC DIMENSION: 3.4 CM
RV TISSUE DOPPLER FREE WALL SYSTOLIC VELOCITY 1 (APICAL 4 CHAMBER VIEW): 11.11 CM/S
SINUS: 3.26 CM
SODIUM SERPL-SCNC: 137 MMOL/L (ref 136–145)
SODIUM SERPL-SCNC: 137 MMOL/L (ref 136–145)
STJ: 2.6 CM
TDI LATERAL: 0.11 M/S
TDI SEPTAL: 0.09 M/S
TDI: 0.1 M/S
TRICUSPID ANNULAR PLANE SYSTOLIC EXCURSION: 2.19 CM
WBC # BLD AUTO: 5.82 K/UL (ref 3.9–12.7)

## 2021-05-13 PROCEDURE — 25000242 PHARM REV CODE 250 ALT 637 W/ HCPCS: Performed by: PHYSICIAN ASSISTANT

## 2021-05-13 PROCEDURE — G0378 HOSPITAL OBSERVATION PER HR: HCPCS

## 2021-05-13 PROCEDURE — 94640 AIRWAY INHALATION TREATMENT: CPT

## 2021-05-13 PROCEDURE — 84100 ASSAY OF PHOSPHORUS: CPT | Performed by: PHYSICIAN ASSISTANT

## 2021-05-13 PROCEDURE — 99900035 HC TECH TIME PER 15 MIN (STAT)

## 2021-05-13 PROCEDURE — 36415 COLL VENOUS BLD VENIPUNCTURE: CPT | Performed by: PHYSICIAN ASSISTANT

## 2021-05-13 PROCEDURE — 63600175 PHARM REV CODE 636 W HCPCS: Performed by: PHYSICIAN ASSISTANT

## 2021-05-13 PROCEDURE — 99226 PR SUBSEQUENT OBSERVATION CARE,LEVEL III: CPT | Mod: ,,, | Performed by: PHYSICIAN ASSISTANT

## 2021-05-13 PROCEDURE — 83735 ASSAY OF MAGNESIUM: CPT | Performed by: PHYSICIAN ASSISTANT

## 2021-05-13 PROCEDURE — 93010 EKG 12-LEAD: ICD-10-PCS | Mod: ,,, | Performed by: INTERNAL MEDICINE

## 2021-05-13 PROCEDURE — 25000003 PHARM REV CODE 250: Performed by: PHYSICIAN ASSISTANT

## 2021-05-13 PROCEDURE — 80048 BASIC METABOLIC PNL TOTAL CA: CPT | Performed by: PHYSICIAN ASSISTANT

## 2021-05-13 PROCEDURE — 93010 ELECTROCARDIOGRAM REPORT: CPT | Mod: ,,, | Performed by: INTERNAL MEDICINE

## 2021-05-13 PROCEDURE — 80048 BASIC METABOLIC PNL TOTAL CA: CPT | Mod: 91 | Performed by: PHYSICIAN ASSISTANT

## 2021-05-13 PROCEDURE — 93005 ELECTROCARDIOGRAM TRACING: CPT

## 2021-05-13 PROCEDURE — 99226 PR SUBSEQUENT OBSERVATION CARE,LEVEL III: ICD-10-PCS | Mod: ,,, | Performed by: PHYSICIAN ASSISTANT

## 2021-05-13 PROCEDURE — 94761 N-INVAS EAR/PLS OXIMETRY MLT: CPT

## 2021-05-13 PROCEDURE — 85025 COMPLETE CBC W/AUTO DIFF WBC: CPT | Performed by: PHYSICIAN ASSISTANT

## 2021-05-13 RX ORDER — DOXYCYCLINE 100 MG/1
100 CAPSULE ORAL 2 TIMES DAILY
Qty: 10 CAPSULE | Refills: 0 | Status: SHIPPED | OUTPATIENT
Start: 2021-05-13 | End: 2021-05-18

## 2021-05-13 RX ORDER — METHOCARBAMOL 500 MG/1
500 TABLET, FILM COATED ORAL 4 TIMES DAILY
Qty: 40 TABLET | Refills: 0 | Status: SHIPPED | OUTPATIENT
Start: 2021-05-13 | End: 2021-05-23

## 2021-05-13 RX ORDER — ATORVASTATIN CALCIUM 40 MG/1
40 TABLET, FILM COATED ORAL DAILY
Qty: 30 TABLET | Refills: 11 | Status: ON HOLD | OUTPATIENT
Start: 2021-05-13 | End: 2022-02-11 | Stop reason: HOSPADM

## 2021-05-13 RX ORDER — ASPIRIN 81 MG/1
81 TABLET ORAL DAILY
Qty: 90 TABLET | Refills: 3 | Status: SHIPPED | OUTPATIENT
Start: 2021-05-13 | End: 2022-02-09

## 2021-05-13 RX ORDER — PREDNISONE 20 MG/1
40 TABLET ORAL DAILY
Qty: 8 TABLET | Refills: 0 | Status: SHIPPED | OUTPATIENT
Start: 2021-05-14 | End: 2021-05-18

## 2021-05-13 RX ORDER — METHOCARBAMOL 500 MG/1
500 TABLET, FILM COATED ORAL 4 TIMES DAILY
Status: DISCONTINUED | OUTPATIENT
Start: 2021-05-13 | End: 2021-05-13 | Stop reason: HOSPADM

## 2021-05-13 RX ADMIN — ATORVASTATIN CALCIUM 40 MG: 40 TABLET, FILM COATED ORAL at 09:05

## 2021-05-13 RX ADMIN — METHOCARBAMOL 500 MG: 500 TABLET ORAL at 04:05

## 2021-05-13 RX ADMIN — FLUTICASONE FUROATE AND VILANTEROL TRIFENATATE 1 PUFF: 100; 25 POWDER RESPIRATORY (INHALATION) at 09:05

## 2021-05-13 RX ADMIN — MELATONIN TAB 3 MG 6 MG: 3 TAB at 01:05

## 2021-05-13 RX ADMIN — AMLODIPINE BESYLATE 5 MG: 5 TABLET ORAL at 09:05

## 2021-05-13 RX ADMIN — PREDNISONE 40 MG: 20 TABLET ORAL at 09:05

## 2021-05-13 RX ADMIN — IPRATROPIUM BROMIDE AND ALBUTEROL SULFATE 3 ML: .5; 2.5 SOLUTION RESPIRATORY (INHALATION) at 06:05

## 2021-05-13 RX ADMIN — IPRATROPIUM BROMIDE AND ALBUTEROL SULFATE 3 ML: .5; 2.5 SOLUTION RESPIRATORY (INHALATION) at 02:05

## 2021-05-13 RX ADMIN — LOSARTAN POTASSIUM AND HYDROCHLOROTHIAZIDE 1 TABLET: 12.5; 1 TABLET ORAL at 12:05

## 2021-05-13 RX ADMIN — ASPIRIN 81 MG: 81 TABLET, COATED ORAL at 09:05

## 2021-05-14 PROBLEM — R06.02 SHORTNESS OF BREATH: Status: RESOLVED | Noted: 2021-05-12 | Resolved: 2021-05-14

## 2021-05-14 PROBLEM — J96.10 CHRONIC RESPIRATORY FAILURE: Status: RESOLVED | Noted: 2018-08-12 | Resolved: 2021-05-14

## 2021-05-17 ENCOUNTER — PATIENT OUTREACH (OUTPATIENT)
Dept: EMERGENCY MEDICINE | Facility: HOSPITAL | Age: 61
End: 2021-05-17

## 2021-05-21 ENCOUNTER — HOSPITAL ENCOUNTER (INPATIENT)
Facility: HOSPITAL | Age: 61
LOS: 2 days | Discharge: HOME | DRG: 193 | End: 2021-05-23
Attending: EMERGENCY MEDICINE | Admitting: STUDENT IN AN ORGANIZED HEALTH CARE EDUCATION/TRAINING PROGRAM
Payer: MEDICAID

## 2021-05-21 DIAGNOSIS — F10.10 ALCOHOL USE DISORDER, MILD, ABUSE: ICD-10-CM

## 2021-05-21 DIAGNOSIS — J44.1 COPD EXACERBATION: ICD-10-CM

## 2021-05-21 DIAGNOSIS — R06.02 SHORTNESS OF BREATH: ICD-10-CM

## 2021-05-21 PROBLEM — I50.20 HFREF (HEART FAILURE WITH REDUCED EJECTION FRACTION): Status: RESOLVED | Noted: 2020-03-16 | Resolved: 2021-05-21

## 2021-05-21 PROBLEM — R74.01 TRANSAMINITIS: Status: RESOLVED | Noted: 2020-01-29 | Resolved: 2021-05-21

## 2021-05-21 PROBLEM — F17.210 CIGARETTE NICOTINE DEPENDENCE WITHOUT COMPLICATION: Status: RESOLVED | Noted: 2021-04-27 | Resolved: 2021-05-21

## 2021-05-21 PROBLEM — I10 ESSENTIAL HYPERTENSION: Chronic | Status: ACTIVE | Noted: 2017-05-19

## 2021-05-21 PROBLEM — I21.4 NSTEMI (NON-ST ELEVATED MYOCARDIAL INFARCTION): Status: RESOLVED | Noted: 2020-04-19 | Resolved: 2021-05-21

## 2021-05-21 PROBLEM — Z72.0 TOBACCO ABUSE: Chronic | Status: ACTIVE | Noted: 2018-08-12

## 2021-05-21 PROBLEM — R79.89 ELEVATED TROPONIN: Status: RESOLVED | Noted: 2018-08-12 | Resolved: 2021-05-21

## 2021-05-21 PROBLEM — Z71.6 ENCOUNTER FOR SMOKING CESSATION COUNSELING: Status: RESOLVED | Noted: 2021-05-01 | Resolved: 2021-05-21

## 2021-05-21 PROBLEM — J44.9 CHRONIC OBSTRUCTIVE PULMONARY DISEASE: Chronic | Status: ACTIVE | Noted: 2021-04-27

## 2021-05-21 PROBLEM — I50.32 CHRONIC DIASTOLIC HEART FAILURE: Chronic | Status: ACTIVE | Noted: 2021-05-21

## 2021-05-21 PROBLEM — I50.32 CHRONIC DIASTOLIC HEART FAILURE: Status: ACTIVE | Noted: 2021-05-21

## 2021-05-21 PROBLEM — N17.9 AKI (ACUTE KIDNEY INJURY): Status: RESOLVED | Noted: 2020-04-22 | Resolved: 2021-05-21

## 2021-05-21 LAB
ALBUMIN SERPL BCP-MCNC: 3.1 G/DL (ref 3.5–5.2)
ALLENS TEST: ABNORMAL
ALP SERPL-CCNC: 93 U/L (ref 55–135)
ALT SERPL W/O P-5'-P-CCNC: 11 U/L (ref 10–44)
ANION GAP SERPL CALC-SCNC: 12 MMOL/L (ref 8–16)
AST SERPL-CCNC: 18 U/L (ref 10–40)
BASOPHILS # BLD AUTO: 0.02 K/UL (ref 0–0.2)
BASOPHILS NFR BLD: 0.2 % (ref 0–1.9)
BILIRUB SERPL-MCNC: 0.2 MG/DL (ref 0.1–1)
BUN SERPL-MCNC: 15 MG/DL (ref 6–20)
CALCIUM SERPL-MCNC: 8.5 MG/DL (ref 8.7–10.5)
CHLORIDE SERPL-SCNC: 103 MMOL/L (ref 95–110)
CO2 SERPL-SCNC: 26 MMOL/L (ref 23–29)
CREAT SERPL-MCNC: 0.9 MG/DL (ref 0.5–1.4)
CTP QC/QA: YES
CTP QC/QA: YES
DELSYS: ABNORMAL
DIFFERENTIAL METHOD: ABNORMAL
EOSINOPHIL # BLD AUTO: 0.3 K/UL (ref 0–0.5)
EOSINOPHIL NFR BLD: 2.9 % (ref 0–8)
ERYTHROCYTE [DISTWIDTH] IN BLOOD BY AUTOMATED COUNT: 19.9 % (ref 11.5–14.5)
EST. GFR  (AFRICAN AMERICAN): >60 ML/MIN/1.73 M^2
EST. GFR  (NON AFRICAN AMERICAN): >60 ML/MIN/1.73 M^2
ETHANOL SERPL-MCNC: 323 MG/DL
FIO2: 30
FLOW: 6
GLUCOSE SERPL-MCNC: 100 MG/DL (ref 70–110)
HCO3 UR-SCNC: 32.4 MMOL/L (ref 24–28)
HCT VFR BLD AUTO: 41.9 % (ref 40–54)
HGB BLD-MCNC: 13.3 G/DL (ref 14–18)
IMM GRANULOCYTES # BLD AUTO: 0.06 K/UL (ref 0–0.04)
IMM GRANULOCYTES NFR BLD AUTO: 0.7 % (ref 0–0.5)
LYMPHOCYTES # BLD AUTO: 3.5 K/UL (ref 1–4.8)
LYMPHOCYTES NFR BLD: 39.2 % (ref 18–48)
MCH RBC QN AUTO: 26.7 PG (ref 27–31)
MCHC RBC AUTO-ENTMCNC: 31.7 G/DL (ref 32–36)
MCV RBC AUTO: 84 FL (ref 82–98)
MODE: ABNORMAL
MONOCYTES # BLD AUTO: 1.1 K/UL (ref 0.3–1)
MONOCYTES NFR BLD: 11.7 % (ref 4–15)
NEUTROPHILS # BLD AUTO: 4.1 K/UL (ref 1.8–7.7)
NEUTROPHILS NFR BLD: 45.3 % (ref 38–73)
NRBC BLD-RTO: 0 /100 WBC
PCO2 BLDA: 68.7 MMHG (ref 35–45)
PH SMN: 7.28 [PH] (ref 7.35–7.45)
PLATELET # BLD AUTO: 335 K/UL (ref 150–450)
PMV BLD AUTO: 9.6 FL (ref 9.2–12.9)
PO2 BLDA: 113 MMHG (ref 40–60)
POC BE: 6 MMOL/L
POC SATURATED O2: 98 % (ref 95–100)
POC TCO2: 34 MMOL/L (ref 24–29)
POTASSIUM SERPL-SCNC: 4.1 MMOL/L (ref 3.5–5.1)
PROT SERPL-MCNC: 7 G/DL (ref 6–8.4)
PROVIDER CREDENTIALS: ABNORMAL
PROVIDER NOTIFIED: ABNORMAL
RBC # BLD AUTO: 4.99 M/UL (ref 4.6–6.2)
SAMPLE: ABNORMAL
SARS-COV-2 RDRP RESP QL NAA+PROBE: NEGATIVE
SARS-COV-2 RDRP RESP QL NAA+PROBE: NEGATIVE
SITE: ABNORMAL
SODIUM SERPL-SCNC: 141 MMOL/L (ref 136–145)
SP02: 98
TIME NOTIFIED: 1716
VERBAL RESULT READBACK PERFORMED: YES
WBC # BLD AUTO: 9.01 K/UL (ref 3.9–12.7)

## 2021-05-21 PROCEDURE — 80053 COMPREHEN METABOLIC PANEL: CPT | Performed by: STUDENT IN AN ORGANIZED HEALTH CARE EDUCATION/TRAINING PROGRAM

## 2021-05-21 PROCEDURE — U0002 COVID-19 LAB TEST NON-CDC: HCPCS | Performed by: EMERGENCY MEDICINE

## 2021-05-21 PROCEDURE — 63600175 PHARM REV CODE 636 W HCPCS: Performed by: EMERGENCY MEDICINE

## 2021-05-21 PROCEDURE — 96365 THER/PROPH/DIAG IV INF INIT: CPT

## 2021-05-21 PROCEDURE — U0002 COVID-19 LAB TEST NON-CDC: HCPCS | Performed by: STUDENT IN AN ORGANIZED HEALTH CARE EDUCATION/TRAINING PROGRAM

## 2021-05-21 PROCEDURE — 25000242 PHARM REV CODE 250 ALT 637 W/ HCPCS: Performed by: EMERGENCY MEDICINE

## 2021-05-21 PROCEDURE — 85025 COMPLETE CBC W/AUTO DIFF WBC: CPT | Performed by: STUDENT IN AN ORGANIZED HEALTH CARE EDUCATION/TRAINING PROGRAM

## 2021-05-21 PROCEDURE — 93010 EKG 12-LEAD: ICD-10-PCS | Mod: ,,, | Performed by: INTERNAL MEDICINE

## 2021-05-21 PROCEDURE — 27000221 HC OXYGEN, UP TO 24 HOURS

## 2021-05-21 PROCEDURE — 63600175 PHARM REV CODE 636 W HCPCS: Performed by: STUDENT IN AN ORGANIZED HEALTH CARE EDUCATION/TRAINING PROGRAM

## 2021-05-21 PROCEDURE — 99285 EMERGENCY DEPT VISIT HI MDM: CPT | Mod: 25

## 2021-05-21 PROCEDURE — 94761 N-INVAS EAR/PLS OXIMETRY MLT: CPT

## 2021-05-21 PROCEDURE — 87040 BLOOD CULTURE FOR BACTERIA: CPT | Performed by: STUDENT IN AN ORGANIZED HEALTH CARE EDUCATION/TRAINING PROGRAM

## 2021-05-21 PROCEDURE — 20600001 HC STEP DOWN PRIVATE ROOM

## 2021-05-21 PROCEDURE — 94640 AIRWAY INHALATION TREATMENT: CPT

## 2021-05-21 PROCEDURE — 99285 PR EMERGENCY DEPT VISIT,LEVEL V: ICD-10-PCS | Mod: CR,CS,, | Performed by: EMERGENCY MEDICINE

## 2021-05-21 PROCEDURE — 63700000 PHARM REV CODE 250 ALT 637 W/O HCPCS: Performed by: STUDENT IN AN ORGANIZED HEALTH CARE EDUCATION/TRAINING PROGRAM

## 2021-05-21 PROCEDURE — 25000003 PHARM REV CODE 250: Performed by: STUDENT IN AN ORGANIZED HEALTH CARE EDUCATION/TRAINING PROGRAM

## 2021-05-21 PROCEDURE — 82077 ASSAY SPEC XCP UR&BREATH IA: CPT | Performed by: EMERGENCY MEDICINE

## 2021-05-21 PROCEDURE — 93005 ELECTROCARDIOGRAM TRACING: CPT

## 2021-05-21 PROCEDURE — 99285 EMERGENCY DEPT VISIT HI MDM: CPT | Mod: CR,CS,, | Performed by: EMERGENCY MEDICINE

## 2021-05-21 PROCEDURE — 82803 BLOOD GASES ANY COMBINATION: CPT

## 2021-05-21 PROCEDURE — 93010 ELECTROCARDIOGRAM REPORT: CPT | Mod: ,,, | Performed by: INTERNAL MEDICINE

## 2021-05-21 PROCEDURE — 99900035 HC TECH TIME PER 15 MIN (STAT)

## 2021-05-21 PROCEDURE — 25500020 PHARM REV CODE 255: Performed by: EMERGENCY MEDICINE

## 2021-05-21 PROCEDURE — 25000242 PHARM REV CODE 250 ALT 637 W/ HCPCS: Performed by: STUDENT IN AN ORGANIZED HEALTH CARE EDUCATION/TRAINING PROGRAM

## 2021-05-21 PROCEDURE — 96375 TX/PRO/DX INJ NEW DRUG ADDON: CPT

## 2021-05-21 RX ORDER — IPRATROPIUM BROMIDE AND ALBUTEROL SULFATE 2.5; .5 MG/3ML; MG/3ML
3 SOLUTION RESPIRATORY (INHALATION) EVERY 6 HOURS
Status: DISCONTINUED | OUTPATIENT
Start: 2021-05-22 | End: 2021-05-23 | Stop reason: HOSPADM

## 2021-05-21 RX ORDER — SODIUM CHLORIDE 0.9 % (FLUSH) 0.9 %
10 SYRINGE (ML) INJECTION
Status: DISCONTINUED | OUTPATIENT
Start: 2021-05-21 | End: 2021-05-23 | Stop reason: HOSPADM

## 2021-05-21 RX ORDER — LOSARTAN POTASSIUM AND HYDROCHLOROTHIAZIDE 12.5; 1 MG/1; MG/1
1 TABLET ORAL DAILY
Status: DISCONTINUED | OUTPATIENT
Start: 2021-05-22 | End: 2021-05-23 | Stop reason: HOSPADM

## 2021-05-21 RX ORDER — TALC
6 POWDER (GRAM) TOPICAL NIGHTLY PRN
Status: DISCONTINUED | OUTPATIENT
Start: 2021-05-21 | End: 2021-05-23 | Stop reason: HOSPADM

## 2021-05-21 RX ORDER — ALBUTEROL SULFATE 2.5 MG/.5ML
5 SOLUTION RESPIRATORY (INHALATION)
Status: COMPLETED | OUTPATIENT
Start: 2021-05-21 | End: 2021-05-21

## 2021-05-21 RX ORDER — THIAMINE HYDROCHLORIDE 100 MG/ML
100 INJECTION, SOLUTION INTRAMUSCULAR; INTRAVENOUS DAILY
Status: DISCONTINUED | OUTPATIENT
Start: 2021-05-22 | End: 2021-05-23 | Stop reason: HOSPADM

## 2021-05-21 RX ORDER — FOLIC ACID 1 MG/1
1 TABLET ORAL DAILY
Status: DISCONTINUED | OUTPATIENT
Start: 2021-05-22 | End: 2021-05-23 | Stop reason: HOSPADM

## 2021-05-21 RX ORDER — CEFTRIAXONE 1 G/1
1 INJECTION, POWDER, FOR SOLUTION INTRAMUSCULAR; INTRAVENOUS
Status: COMPLETED | OUTPATIENT
Start: 2021-05-21 | End: 2021-05-21

## 2021-05-21 RX ORDER — VANCOMYCIN HCL IN 5 % DEXTROSE 1G/250ML
1000 PLASTIC BAG, INJECTION (ML) INTRAVENOUS
Status: DISCONTINUED | OUTPATIENT
Start: 2021-05-22 | End: 2021-05-22

## 2021-05-21 RX ORDER — AZITHROMYCIN 250 MG/1
500 TABLET, FILM COATED ORAL
Status: COMPLETED | OUTPATIENT
Start: 2021-05-21 | End: 2021-05-21

## 2021-05-21 RX ORDER — IBUPROFEN 200 MG
1 TABLET ORAL DAILY
Status: DISCONTINUED | OUTPATIENT
Start: 2021-05-22 | End: 2021-05-23 | Stop reason: HOSPADM

## 2021-05-21 RX ORDER — AMLODIPINE BESYLATE 5 MG/1
5 TABLET ORAL DAILY
Status: DISCONTINUED | OUTPATIENT
Start: 2021-05-22 | End: 2021-05-23 | Stop reason: HOSPADM

## 2021-05-21 RX ORDER — DIAZEPAM 5 MG/1
5 TABLET ORAL EVERY 8 HOURS
Status: DISCONTINUED | OUTPATIENT
Start: 2021-05-21 | End: 2021-05-23 | Stop reason: HOSPADM

## 2021-05-21 RX ORDER — PREDNISONE 20 MG/1
60 TABLET ORAL
Status: COMPLETED | OUTPATIENT
Start: 2021-05-21 | End: 2021-05-21

## 2021-05-21 RX ORDER — VANCOMYCIN HCL IN 5 % DEXTROSE 1G/250ML
1000 PLASTIC BAG, INJECTION (ML) INTRAVENOUS
Status: COMPLETED | OUTPATIENT
Start: 2021-05-21 | End: 2021-05-21

## 2021-05-21 RX ORDER — IPRATROPIUM BROMIDE AND ALBUTEROL SULFATE 2.5; .5 MG/3ML; MG/3ML
3 SOLUTION RESPIRATORY (INHALATION)
Status: COMPLETED | OUTPATIENT
Start: 2021-05-21 | End: 2021-05-21

## 2021-05-21 RX ORDER — ENOXAPARIN SODIUM 100 MG/ML
40 INJECTION SUBCUTANEOUS EVERY 24 HOURS
Status: DISCONTINUED | OUTPATIENT
Start: 2021-05-21 | End: 2021-05-23 | Stop reason: HOSPADM

## 2021-05-21 RX ORDER — THIAMINE HCL 100 MG
100 TABLET ORAL DAILY
Status: DISCONTINUED | OUTPATIENT
Start: 2021-05-25 | End: 2021-05-23 | Stop reason: HOSPADM

## 2021-05-21 RX ORDER — ASPIRIN 81 MG/1
81 TABLET ORAL DAILY
Status: DISCONTINUED | OUTPATIENT
Start: 2021-05-22 | End: 2021-05-23 | Stop reason: HOSPADM

## 2021-05-21 RX ORDER — LORAZEPAM 1 MG/1
2 TABLET ORAL EVERY 4 HOURS PRN
Status: DISCONTINUED | OUTPATIENT
Start: 2021-05-21 | End: 2021-05-23 | Stop reason: HOSPADM

## 2021-05-21 RX ORDER — ATORVASTATIN CALCIUM 40 MG/1
40 TABLET, FILM COATED ORAL DAILY
Status: DISCONTINUED | OUTPATIENT
Start: 2021-05-22 | End: 2021-05-23 | Stop reason: HOSPADM

## 2021-05-21 RX ADMIN — CEFTRIAXONE 1 G: 1 INJECTION, POWDER, FOR SOLUTION INTRAMUSCULAR; INTRAVENOUS at 05:05

## 2021-05-21 RX ADMIN — DIAZEPAM 5 MG: 5 TABLET ORAL at 09:05

## 2021-05-21 RX ADMIN — IPRATROPIUM BROMIDE AND ALBUTEROL SULFATE 3 ML: .5; 2.5 SOLUTION RESPIRATORY (INHALATION) at 05:05

## 2021-05-21 RX ADMIN — ALBUTEROL SULFATE 5 MG: 2.5 SOLUTION RESPIRATORY (INHALATION) at 06:05

## 2021-05-21 RX ADMIN — PIPERACILLIN SODIUM AND TAZOBACTAM SODIUM 4.5 G: 4; .5 INJECTION, POWDER, FOR SOLUTION INTRAVENOUS at 08:05

## 2021-05-21 RX ADMIN — AZITHROMYCIN MONOHYDRATE 500 MG: 250 TABLET ORAL at 05:05

## 2021-05-21 RX ADMIN — VANCOMYCIN HYDROCHLORIDE 1000 MG: 1 INJECTION, POWDER, LYOPHILIZED, FOR SOLUTION INTRAVENOUS at 05:05

## 2021-05-21 RX ADMIN — IOHEXOL 100 ML: 350 INJECTION, SOLUTION INTRAVENOUS at 08:05

## 2021-05-21 RX ADMIN — PREDNISONE 60 MG: 20 TABLET ORAL at 07:05

## 2021-05-21 RX ADMIN — ENOXAPARIN SODIUM 40 MG: 40 INJECTION SUBCUTANEOUS at 08:05

## 2021-05-22 LAB
ALBUMIN SERPL BCP-MCNC: 3 G/DL (ref 3.5–5.2)
ALP SERPL-CCNC: 93 U/L (ref 55–135)
ALT SERPL W/O P-5'-P-CCNC: 12 U/L (ref 10–44)
ANION GAP SERPL CALC-SCNC: 12 MMOL/L (ref 8–16)
ANISOCYTOSIS BLD QL SMEAR: SLIGHT
AST SERPL-CCNC: 16 U/L (ref 10–40)
BASOPHILS # BLD AUTO: 0 K/UL (ref 0–0.2)
BASOPHILS NFR BLD: 0 % (ref 0–1.9)
BILIRUB SERPL-MCNC: 0.2 MG/DL (ref 0.1–1)
BUN SERPL-MCNC: 14 MG/DL (ref 6–20)
CALCIUM SERPL-MCNC: 8.8 MG/DL (ref 8.7–10.5)
CHLORIDE SERPL-SCNC: 101 MMOL/L (ref 95–110)
CO2 SERPL-SCNC: 24 MMOL/L (ref 23–29)
CREAT SERPL-MCNC: 0.9 MG/DL (ref 0.5–1.4)
DIFFERENTIAL METHOD: ABNORMAL
EOSINOPHIL # BLD AUTO: 0 K/UL (ref 0–0.5)
EOSINOPHIL NFR BLD: 0 % (ref 0–8)
ERYTHROCYTE [DISTWIDTH] IN BLOOD BY AUTOMATED COUNT: 19.9 % (ref 11.5–14.5)
EST. GFR  (AFRICAN AMERICAN): >60 ML/MIN/1.73 M^2
EST. GFR  (NON AFRICAN AMERICAN): >60 ML/MIN/1.73 M^2
GLUCOSE SERPL-MCNC: 221 MG/DL (ref 70–110)
HCT VFR BLD AUTO: 43 % (ref 40–54)
HGB BLD-MCNC: 13.5 G/DL (ref 14–18)
IMM GRANULOCYTES # BLD AUTO: 0.07 K/UL (ref 0–0.04)
IMM GRANULOCYTES NFR BLD AUTO: 1.5 % (ref 0–0.5)
LYMPHOCYTES # BLD AUTO: 0.6 K/UL (ref 1–4.8)
LYMPHOCYTES NFR BLD: 11.9 % (ref 18–48)
MAGNESIUM SERPL-MCNC: 1.9 MG/DL (ref 1.6–2.6)
MCH RBC QN AUTO: 26.5 PG (ref 27–31)
MCHC RBC AUTO-ENTMCNC: 31.4 G/DL (ref 32–36)
MCV RBC AUTO: 85 FL (ref 82–98)
MONOCYTES # BLD AUTO: 0.1 K/UL (ref 0.3–1)
MONOCYTES NFR BLD: 1.5 % (ref 4–15)
NEUTROPHILS # BLD AUTO: 4 K/UL (ref 1.8–7.7)
NEUTROPHILS NFR BLD: 85.1 % (ref 38–73)
NRBC BLD-RTO: 0 /100 WBC
PLATELET # BLD AUTO: 272 K/UL (ref 150–450)
PLATELET BLD QL SMEAR: ABNORMAL
PMV BLD AUTO: 10.1 FL (ref 9.2–12.9)
POCT GLUCOSE: 155 MG/DL (ref 70–110)
POCT GLUCOSE: 176 MG/DL (ref 70–110)
POCT GLUCOSE: 177 MG/DL (ref 70–110)
POCT GLUCOSE: 305 MG/DL (ref 70–110)
POLYCHROMASIA BLD QL SMEAR: ABNORMAL
POTASSIUM SERPL-SCNC: 4.5 MMOL/L (ref 3.5–5.1)
PROT SERPL-MCNC: 7.1 G/DL (ref 6–8.4)
RBC # BLD AUTO: 5.09 M/UL (ref 4.6–6.2)
SODIUM SERPL-SCNC: 137 MMOL/L (ref 136–145)
WBC # BLD AUTO: 4.69 K/UL (ref 3.9–12.7)

## 2021-05-22 PROCEDURE — 25000242 PHARM REV CODE 250 ALT 637 W/ HCPCS: Performed by: STUDENT IN AN ORGANIZED HEALTH CARE EDUCATION/TRAINING PROGRAM

## 2021-05-22 PROCEDURE — 80053 COMPREHEN METABOLIC PANEL: CPT | Performed by: STUDENT IN AN ORGANIZED HEALTH CARE EDUCATION/TRAINING PROGRAM

## 2021-05-22 PROCEDURE — 99223 PR INITIAL HOSPITAL CARE,LEVL III: ICD-10-PCS | Mod: ,,, | Performed by: STUDENT IN AN ORGANIZED HEALTH CARE EDUCATION/TRAINING PROGRAM

## 2021-05-22 PROCEDURE — 99900035 HC TECH TIME PER 15 MIN (STAT)

## 2021-05-22 PROCEDURE — 83735 ASSAY OF MAGNESIUM: CPT | Performed by: STUDENT IN AN ORGANIZED HEALTH CARE EDUCATION/TRAINING PROGRAM

## 2021-05-22 PROCEDURE — 94640 AIRWAY INHALATION TREATMENT: CPT

## 2021-05-22 PROCEDURE — 63600175 PHARM REV CODE 636 W HCPCS: Performed by: EMERGENCY MEDICINE

## 2021-05-22 PROCEDURE — S4991 NICOTINE PATCH NONLEGEND: HCPCS | Performed by: STUDENT IN AN ORGANIZED HEALTH CARE EDUCATION/TRAINING PROGRAM

## 2021-05-22 PROCEDURE — 25000003 PHARM REV CODE 250: Performed by: INTERNAL MEDICINE

## 2021-05-22 PROCEDURE — 99223 1ST HOSP IP/OBS HIGH 75: CPT | Mod: ,,, | Performed by: STUDENT IN AN ORGANIZED HEALTH CARE EDUCATION/TRAINING PROGRAM

## 2021-05-22 PROCEDURE — 85025 COMPLETE CBC W/AUTO DIFF WBC: CPT | Performed by: STUDENT IN AN ORGANIZED HEALTH CARE EDUCATION/TRAINING PROGRAM

## 2021-05-22 PROCEDURE — 63600175 PHARM REV CODE 636 W HCPCS: Performed by: STUDENT IN AN ORGANIZED HEALTH CARE EDUCATION/TRAINING PROGRAM

## 2021-05-22 PROCEDURE — 63700000 PHARM REV CODE 250 ALT 637 W/O HCPCS: Performed by: STUDENT IN AN ORGANIZED HEALTH CARE EDUCATION/TRAINING PROGRAM

## 2021-05-22 PROCEDURE — 25000003 PHARM REV CODE 250: Performed by: STUDENT IN AN ORGANIZED HEALTH CARE EDUCATION/TRAINING PROGRAM

## 2021-05-22 PROCEDURE — 20600001 HC STEP DOWN PRIVATE ROOM

## 2021-05-22 PROCEDURE — 36415 COLL VENOUS BLD VENIPUNCTURE: CPT | Performed by: STUDENT IN AN ORGANIZED HEALTH CARE EDUCATION/TRAINING PROGRAM

## 2021-05-22 PROCEDURE — 94761 N-INVAS EAR/PLS OXIMETRY MLT: CPT

## 2021-05-22 PROCEDURE — 99222 PR INITIAL HOSPITAL CARE,LEVL II: ICD-10-PCS | Mod: ,,, | Performed by: INTERNAL MEDICINE

## 2021-05-22 PROCEDURE — 27000221 HC OXYGEN, UP TO 24 HOURS

## 2021-05-22 PROCEDURE — 25000003 PHARM REV CODE 250: Performed by: EMERGENCY MEDICINE

## 2021-05-22 PROCEDURE — 99222 1ST HOSP IP/OBS MODERATE 55: CPT | Mod: ,,, | Performed by: INTERNAL MEDICINE

## 2021-05-22 RX ORDER — PREDNISONE 20 MG/1
40 TABLET ORAL DAILY
Status: DISCONTINUED | OUTPATIENT
Start: 2021-05-23 | End: 2021-05-23 | Stop reason: HOSPADM

## 2021-05-22 RX ORDER — IBUPROFEN 200 MG
16 TABLET ORAL
Status: DISCONTINUED | OUTPATIENT
Start: 2021-05-22 | End: 2021-05-23 | Stop reason: HOSPADM

## 2021-05-22 RX ORDER — CEFTRIAXONE 1 G/1
1 INJECTION, POWDER, FOR SOLUTION INTRAMUSCULAR; INTRAVENOUS
Status: DISCONTINUED | OUTPATIENT
Start: 2021-05-22 | End: 2021-05-23 | Stop reason: HOSPADM

## 2021-05-22 RX ORDER — IBUPROFEN 200 MG
24 TABLET ORAL
Status: DISCONTINUED | OUTPATIENT
Start: 2021-05-22 | End: 2021-05-23 | Stop reason: HOSPADM

## 2021-05-22 RX ORDER — GLUCAGON 1 MG
1 KIT INJECTION
Status: DISCONTINUED | OUTPATIENT
Start: 2021-05-22 | End: 2021-05-23 | Stop reason: HOSPADM

## 2021-05-22 RX ORDER — AZITHROMYCIN 250 MG/1
500 TABLET, FILM COATED ORAL DAILY
Status: COMPLETED | OUTPATIENT
Start: 2021-05-22 | End: 2021-05-23

## 2021-05-22 RX ORDER — INSULIN ASPART 100 [IU]/ML
1-10 INJECTION, SOLUTION INTRAVENOUS; SUBCUTANEOUS
Status: DISCONTINUED | OUTPATIENT
Start: 2021-05-22 | End: 2021-05-23 | Stop reason: HOSPADM

## 2021-05-22 RX ADMIN — IPRATROPIUM BROMIDE AND ALBUTEROL SULFATE 3 ML: .5; 2.5 SOLUTION RESPIRATORY (INHALATION) at 08:05

## 2021-05-22 RX ADMIN — ASPIRIN 81 MG: 81 TABLET, COATED ORAL at 09:05

## 2021-05-22 RX ADMIN — IPRATROPIUM BROMIDE AND ALBUTEROL SULFATE 3 ML: .5; 2.5 SOLUTION RESPIRATORY (INHALATION) at 04:05

## 2021-05-22 RX ADMIN — DIAZEPAM 5 MG: 5 TABLET ORAL at 01:05

## 2021-05-22 RX ADMIN — ATORVASTATIN CALCIUM 40 MG: 40 TABLET, FILM COATED ORAL at 09:05

## 2021-05-22 RX ADMIN — MELATONIN TAB 3 MG 6 MG: 3 TAB at 09:05

## 2021-05-22 RX ADMIN — DIAZEPAM 5 MG: 5 TABLET ORAL at 05:05

## 2021-05-22 RX ADMIN — PREDNISONE 60 MG: 10 TABLET ORAL at 10:05

## 2021-05-22 RX ADMIN — THERA TABS 1 TABLET: TAB at 09:05

## 2021-05-22 RX ADMIN — ENOXAPARIN SODIUM 40 MG: 40 INJECTION SUBCUTANEOUS at 04:05

## 2021-05-22 RX ADMIN — THIAMINE HYDROCHLORIDE 100 MG: 100 INJECTION, SOLUTION INTRAMUSCULAR; INTRAVENOUS at 12:05

## 2021-05-22 RX ADMIN — VANCOMYCIN HYDROCHLORIDE 1000 MG: 1 INJECTION, POWDER, LYOPHILIZED, FOR SOLUTION INTRAVENOUS at 05:05

## 2021-05-22 RX ADMIN — FOLIC ACID 1 MG: 1 TABLET ORAL at 09:05

## 2021-05-22 RX ADMIN — LOSARTAN POTASSIUM AND HYDROCHLOROTHIAZIDE 1 TABLET: 12.5; 1 TABLET ORAL at 09:05

## 2021-05-22 RX ADMIN — AZITHROMYCIN MONOHYDRATE 500 MG: 250 TABLET ORAL at 09:05

## 2021-05-22 RX ADMIN — NICOTINE 1 PATCH: 21 PATCH, EXTENDED RELEASE TRANSDERMAL at 10:05

## 2021-05-22 RX ADMIN — IPRATROPIUM BROMIDE AND ALBUTEROL SULFATE 3 ML: .5; 2.5 SOLUTION RESPIRATORY (INHALATION) at 07:05

## 2021-05-22 RX ADMIN — CEFTRIAXONE 1 G: 1 INJECTION, POWDER, FOR SOLUTION INTRAMUSCULAR; INTRAVENOUS at 09:05

## 2021-05-22 RX ADMIN — DIAZEPAM 5 MG: 5 TABLET ORAL at 09:05

## 2021-05-22 RX ADMIN — IPRATROPIUM BROMIDE AND ALBUTEROL SULFATE 3 ML: .5; 2.5 SOLUTION RESPIRATORY (INHALATION) at 12:05

## 2021-05-22 RX ADMIN — PIPERACILLIN SODIUM AND TAZOBACTAM SODIUM 4.5 G: 4; .5 INJECTION, POWDER, FOR SOLUTION INTRAVENOUS at 10:05

## 2021-05-22 RX ADMIN — AMLODIPINE BESYLATE 5 MG: 5 TABLET ORAL at 09:05

## 2021-05-23 VITALS
SYSTOLIC BLOOD PRESSURE: 159 MMHG | WEIGHT: 150 LBS | HEIGHT: 68 IN | HEART RATE: 76 BPM | RESPIRATION RATE: 28 BRPM | BODY MASS INDEX: 22.73 KG/M2 | OXYGEN SATURATION: 95 % | DIASTOLIC BLOOD PRESSURE: 81 MMHG | TEMPERATURE: 98 F

## 2021-05-23 PROBLEM — I25.10 NONOBSTRUCTIVE ATHEROSCLEROSIS OF CORONARY ARTERY: Chronic | Status: ACTIVE | Noted: 2020-04-23

## 2021-05-23 LAB
ALBUMIN SERPL BCP-MCNC: 2.9 G/DL (ref 3.5–5.2)
ALP SERPL-CCNC: 92 U/L (ref 55–135)
ALT SERPL W/O P-5'-P-CCNC: 11 U/L (ref 10–44)
ANION GAP SERPL CALC-SCNC: 9 MMOL/L (ref 8–16)
AST SERPL-CCNC: 14 U/L (ref 10–40)
BASOPHILS # BLD AUTO: 0.01 K/UL (ref 0–0.2)
BASOPHILS NFR BLD: 0.1 % (ref 0–1.9)
BILIRUB SERPL-MCNC: 0.2 MG/DL (ref 0.1–1)
BUN SERPL-MCNC: 15 MG/DL (ref 6–20)
CALCIUM SERPL-MCNC: 9.2 MG/DL (ref 8.7–10.5)
CHLORIDE SERPL-SCNC: 99 MMOL/L (ref 95–110)
CO2 SERPL-SCNC: 32 MMOL/L (ref 23–29)
CREAT SERPL-MCNC: 0.8 MG/DL (ref 0.5–1.4)
DIFFERENTIAL METHOD: ABNORMAL
EOSINOPHIL # BLD AUTO: 0 K/UL (ref 0–0.5)
EOSINOPHIL NFR BLD: 0 % (ref 0–8)
ERYTHROCYTE [DISTWIDTH] IN BLOOD BY AUTOMATED COUNT: 19.1 % (ref 11.5–14.5)
EST. GFR  (AFRICAN AMERICAN): >60 ML/MIN/1.73 M^2
EST. GFR  (NON AFRICAN AMERICAN): >60 ML/MIN/1.73 M^2
ESTIMATED AVG GLUCOSE: 123 MG/DL (ref 68–131)
GLUCOSE SERPL-MCNC: 116 MG/DL (ref 70–110)
HBA1C MFR BLD: 5.9 % (ref 4–5.6)
HCT VFR BLD AUTO: 43.3 % (ref 40–54)
HGB BLD-MCNC: 13.5 G/DL (ref 14–18)
IMM GRANULOCYTES # BLD AUTO: 0.1 K/UL (ref 0–0.04)
IMM GRANULOCYTES NFR BLD AUTO: 0.9 % (ref 0–0.5)
LYMPHOCYTES # BLD AUTO: 1 K/UL (ref 1–4.8)
LYMPHOCYTES NFR BLD: 8.8 % (ref 18–48)
MAGNESIUM SERPL-MCNC: 1.9 MG/DL (ref 1.6–2.6)
MCH RBC QN AUTO: 26.1 PG (ref 27–31)
MCHC RBC AUTO-ENTMCNC: 31.2 G/DL (ref 32–36)
MCV RBC AUTO: 84 FL (ref 82–98)
MONOCYTES # BLD AUTO: 0.9 K/UL (ref 0.3–1)
MONOCYTES NFR BLD: 8.2 % (ref 4–15)
NEUTROPHILS # BLD AUTO: 9.1 K/UL (ref 1.8–7.7)
NEUTROPHILS NFR BLD: 82 % (ref 38–73)
NRBC BLD-RTO: 0 /100 WBC
PLATELET # BLD AUTO: 276 K/UL (ref 150–450)
PMV BLD AUTO: 9.5 FL (ref 9.2–12.9)
POCT GLUCOSE: 125 MG/DL (ref 70–110)
POTASSIUM SERPL-SCNC: 3.9 MMOL/L (ref 3.5–5.1)
PROT SERPL-MCNC: 6.6 G/DL (ref 6–8.4)
RBC # BLD AUTO: 5.17 M/UL (ref 4.6–6.2)
SODIUM SERPL-SCNC: 140 MMOL/L (ref 136–145)
WBC # BLD AUTO: 11.11 K/UL (ref 3.9–12.7)

## 2021-05-23 PROCEDURE — 99239 PR HOSPITAL DISCHARGE DAY,>30 MIN: ICD-10-PCS | Mod: ,,, | Performed by: STUDENT IN AN ORGANIZED HEALTH CARE EDUCATION/TRAINING PROGRAM

## 2021-05-23 PROCEDURE — 63700000 PHARM REV CODE 250 ALT 637 W/O HCPCS: Performed by: STUDENT IN AN ORGANIZED HEALTH CARE EDUCATION/TRAINING PROGRAM

## 2021-05-23 PROCEDURE — 94761 N-INVAS EAR/PLS OXIMETRY MLT: CPT

## 2021-05-23 PROCEDURE — 63600175 PHARM REV CODE 636 W HCPCS: Performed by: STUDENT IN AN ORGANIZED HEALTH CARE EDUCATION/TRAINING PROGRAM

## 2021-05-23 PROCEDURE — 80053 COMPREHEN METABOLIC PANEL: CPT | Performed by: STUDENT IN AN ORGANIZED HEALTH CARE EDUCATION/TRAINING PROGRAM

## 2021-05-23 PROCEDURE — 25000003 PHARM REV CODE 250: Performed by: STUDENT IN AN ORGANIZED HEALTH CARE EDUCATION/TRAINING PROGRAM

## 2021-05-23 PROCEDURE — 99239 HOSP IP/OBS DSCHRG MGMT >30: CPT | Mod: ,,, | Performed by: STUDENT IN AN ORGANIZED HEALTH CARE EDUCATION/TRAINING PROGRAM

## 2021-05-23 PROCEDURE — 94640 AIRWAY INHALATION TREATMENT: CPT

## 2021-05-23 PROCEDURE — 83036 HEMOGLOBIN GLYCOSYLATED A1C: CPT | Performed by: STUDENT IN AN ORGANIZED HEALTH CARE EDUCATION/TRAINING PROGRAM

## 2021-05-23 PROCEDURE — 83735 ASSAY OF MAGNESIUM: CPT | Performed by: STUDENT IN AN ORGANIZED HEALTH CARE EDUCATION/TRAINING PROGRAM

## 2021-05-23 PROCEDURE — 36415 COLL VENOUS BLD VENIPUNCTURE: CPT | Performed by: STUDENT IN AN ORGANIZED HEALTH CARE EDUCATION/TRAINING PROGRAM

## 2021-05-23 PROCEDURE — 85025 COMPLETE CBC W/AUTO DIFF WBC: CPT | Performed by: STUDENT IN AN ORGANIZED HEALTH CARE EDUCATION/TRAINING PROGRAM

## 2021-05-23 PROCEDURE — 27000221 HC OXYGEN, UP TO 24 HOURS

## 2021-05-23 PROCEDURE — 99900035 HC TECH TIME PER 15 MIN (STAT)

## 2021-05-23 PROCEDURE — 25000242 PHARM REV CODE 250 ALT 637 W/ HCPCS: Performed by: STUDENT IN AN ORGANIZED HEALTH CARE EDUCATION/TRAINING PROGRAM

## 2021-05-23 RX ORDER — ALBUTEROL SULFATE 90 UG/1
1-2 AEROSOL, METERED RESPIRATORY (INHALATION) EVERY 4 HOURS PRN
Qty: 8.5 G | Refills: 1 | Status: SHIPPED | OUTPATIENT
Start: 2021-05-23 | End: 2021-07-15 | Stop reason: SDUPTHER

## 2021-05-23 RX ORDER — AMOXICILLIN AND CLAVULANATE POTASSIUM 875; 125 MG/1; MG/1
1 TABLET, FILM COATED ORAL EVERY 12 HOURS
Qty: 4 TABLET | Refills: 0 | Status: SHIPPED | OUTPATIENT
Start: 2021-05-24 | End: 2021-05-26

## 2021-05-23 RX ORDER — PREDNISONE 20 MG/1
40 TABLET ORAL DAILY
Qty: 4 TABLET | Refills: 0 | Status: SHIPPED | OUTPATIENT
Start: 2021-05-24 | End: 2021-05-26

## 2021-05-23 RX ORDER — BUDESONIDE AND FORMOTEROL FUMARATE DIHYDRATE 160; 4.5 UG/1; UG/1
2 AEROSOL RESPIRATORY (INHALATION) EVERY 12 HOURS
Qty: 10.2 G | Refills: 1 | Status: ON HOLD | OUTPATIENT
Start: 2021-05-23 | End: 2021-06-10 | Stop reason: HOSPADM

## 2021-05-23 RX ADMIN — ASPIRIN 81 MG: 81 TABLET, COATED ORAL at 08:05

## 2021-05-23 RX ADMIN — LOSARTAN POTASSIUM AND HYDROCHLOROTHIAZIDE 1 TABLET: 12.5; 1 TABLET ORAL at 08:05

## 2021-05-23 RX ADMIN — THERA TABS 1 TABLET: TAB at 08:05

## 2021-05-23 RX ADMIN — AMLODIPINE BESYLATE 5 MG: 5 TABLET ORAL at 08:05

## 2021-05-23 RX ADMIN — IPRATROPIUM BROMIDE AND ALBUTEROL SULFATE 3 ML: .5; 2.5 SOLUTION RESPIRATORY (INHALATION) at 07:05

## 2021-05-23 RX ADMIN — IPRATROPIUM BROMIDE AND ALBUTEROL SULFATE 3 ML: .5; 2.5 SOLUTION RESPIRATORY (INHALATION) at 12:05

## 2021-05-23 RX ADMIN — FOLIC ACID 1 MG: 1 TABLET ORAL at 08:05

## 2021-05-23 RX ADMIN — IPRATROPIUM BROMIDE AND ALBUTEROL SULFATE 3 ML: .5; 2.5 SOLUTION RESPIRATORY (INHALATION) at 11:05

## 2021-05-23 RX ADMIN — AZITHROMYCIN MONOHYDRATE 500 MG: 250 TABLET ORAL at 08:05

## 2021-05-23 RX ADMIN — DIAZEPAM 5 MG: 5 TABLET ORAL at 05:05

## 2021-05-23 RX ADMIN — CEFTRIAXONE 1 G: 1 INJECTION, POWDER, FOR SOLUTION INTRAMUSCULAR; INTRAVENOUS at 08:05

## 2021-05-23 RX ADMIN — ATORVASTATIN CALCIUM 40 MG: 40 TABLET, FILM COATED ORAL at 08:05

## 2021-05-26 LAB
BACTERIA BLD CULT: NORMAL
BACTERIA BLD CULT: NORMAL

## 2021-05-29 ENCOUNTER — HOSPITAL ENCOUNTER (OUTPATIENT)
Facility: HOSPITAL | Age: 61
Discharge: HOME | End: 2021-05-31
Attending: EMERGENCY MEDICINE | Admitting: EMERGENCY MEDICINE
Payer: MEDICAID

## 2021-05-29 DIAGNOSIS — R07.9 CHEST PAIN: ICD-10-CM

## 2021-05-29 DIAGNOSIS — J44.1 COPD EXACERBATION: Primary | ICD-10-CM

## 2021-05-29 DIAGNOSIS — R06.02 SHORTNESS OF BREATH: ICD-10-CM

## 2021-05-29 LAB
ALBUMIN SERPL BCP-MCNC: 3.1 G/DL (ref 3.5–5.2)
ALLENS TEST: ABNORMAL
ALP SERPL-CCNC: 94 U/L (ref 55–135)
ALT SERPL W/O P-5'-P-CCNC: 16 U/L (ref 10–44)
ANION GAP SERPL CALC-SCNC: 14 MMOL/L (ref 8–16)
AST SERPL-CCNC: 20 U/L (ref 10–40)
BASOPHILS # BLD AUTO: 0.04 K/UL (ref 0–0.2)
BASOPHILS NFR BLD: 0.4 % (ref 0–1.9)
BILIRUB SERPL-MCNC: 0.4 MG/DL (ref 0.1–1)
BNP SERPL-MCNC: 217 PG/ML (ref 0–99)
BUN SERPL-MCNC: 13 MG/DL (ref 6–20)
BUN SERPL-MCNC: 14 MG/DL (ref 6–30)
CALCIUM SERPL-MCNC: 8.7 MG/DL (ref 8.7–10.5)
CHLORIDE SERPL-SCNC: 97 MMOL/L (ref 95–110)
CHLORIDE SERPL-SCNC: 99 MMOL/L (ref 95–110)
CO2 SERPL-SCNC: 25 MMOL/L (ref 23–29)
CREAT SERPL-MCNC: 0.8 MG/DL (ref 0.5–1.4)
CREAT SERPL-MCNC: 1.2 MG/DL (ref 0.5–1.4)
CTP QC/QA: YES
DIFFERENTIAL METHOD: ABNORMAL
EOSINOPHIL # BLD AUTO: 0.1 K/UL (ref 0–0.5)
EOSINOPHIL NFR BLD: 1.5 % (ref 0–8)
ERYTHROCYTE [DISTWIDTH] IN BLOOD BY AUTOMATED COUNT: 19.4 % (ref 11.5–14.5)
EST. GFR  (AFRICAN AMERICAN): >60 ML/MIN/1.73 M^2
EST. GFR  (NON AFRICAN AMERICAN): >60 ML/MIN/1.73 M^2
GLUCOSE SERPL-MCNC: 70 MG/DL (ref 70–110)
GLUCOSE SERPL-MCNC: 75 MG/DL (ref 70–110)
HCO3 UR-SCNC: 29.8 MMOL/L (ref 24–28)
HCT VFR BLD AUTO: 44 % (ref 40–54)
HCT VFR BLD CALC: 45 %PCV (ref 36–54)
HGB BLD-MCNC: 14.1 G/DL (ref 14–18)
IMM GRANULOCYTES # BLD AUTO: 0.03 K/UL (ref 0–0.04)
IMM GRANULOCYTES NFR BLD AUTO: 0.3 % (ref 0–0.5)
LYMPHOCYTES # BLD AUTO: 2.8 K/UL (ref 1–4.8)
LYMPHOCYTES NFR BLD: 30 % (ref 18–48)
MAGNESIUM SERPL-MCNC: 2.2 MG/DL (ref 1.6–2.6)
MCH RBC QN AUTO: 26.9 PG (ref 27–31)
MCHC RBC AUTO-ENTMCNC: 32 G/DL (ref 32–36)
MCV RBC AUTO: 84 FL (ref 82–98)
MONOCYTES # BLD AUTO: 1.1 K/UL (ref 0.3–1)
MONOCYTES NFR BLD: 11.9 % (ref 4–15)
NEUTROPHILS # BLD AUTO: 5.2 K/UL (ref 1.8–7.7)
NEUTROPHILS NFR BLD: 55.9 % (ref 38–73)
NRBC BLD-RTO: 0 /100 WBC
PCO2 BLDA: 52.5 MMHG (ref 35–45)
PH SMN: 7.36 [PH] (ref 7.35–7.45)
PLATELET # BLD AUTO: 250 K/UL (ref 150–450)
PMV BLD AUTO: 10.4 FL (ref 9.2–12.9)
PO2 BLDA: 61 MMHG (ref 40–60)
POC BE: 4 MMOL/L
POC IONIZED CALCIUM: 1 MMOL/L (ref 1.06–1.42)
POC SATURATED O2: 90 % (ref 95–100)
POC TCO2 (MEASURED): 27 MMOL/L (ref 23–29)
POC TCO2: 31 MMOL/L (ref 24–29)
POTASSIUM BLD-SCNC: 4.4 MMOL/L (ref 3.5–5.1)
POTASSIUM SERPL-SCNC: 4.5 MMOL/L (ref 3.5–5.1)
PROT SERPL-MCNC: 6.9 G/DL (ref 6–8.4)
RBC # BLD AUTO: 5.24 M/UL (ref 4.6–6.2)
SAMPLE: ABNORMAL
SAMPLE: ABNORMAL
SARS-COV-2 RDRP RESP QL NAA+PROBE: NEGATIVE
SITE: ABNORMAL
SODIUM BLD-SCNC: 137 MMOL/L (ref 136–145)
SODIUM SERPL-SCNC: 138 MMOL/L (ref 136–145)
TROPONIN I SERPL DL<=0.01 NG/ML-MCNC: 0.08 NG/ML (ref 0–0.03)
WBC # BLD AUTO: 9.26 K/UL (ref 3.9–12.7)

## 2021-05-29 PROCEDURE — 80047 BASIC METABLC PNL IONIZED CA: CPT

## 2021-05-29 PROCEDURE — 63600175 PHARM REV CODE 636 W HCPCS: Performed by: EMERGENCY MEDICINE

## 2021-05-29 PROCEDURE — 82330 ASSAY OF CALCIUM: CPT

## 2021-05-29 PROCEDURE — 80053 COMPREHEN METABOLIC PANEL: CPT | Performed by: EMERGENCY MEDICINE

## 2021-05-29 PROCEDURE — G0378 HOSPITAL OBSERVATION PER HR: HCPCS

## 2021-05-29 PROCEDURE — 85025 COMPLETE CBC W/AUTO DIFF WBC: CPT | Performed by: EMERGENCY MEDICINE

## 2021-05-29 PROCEDURE — 99285 EMERGENCY DEPT VISIT HI MDM: CPT | Mod: 25

## 2021-05-29 PROCEDURE — 93010 EKG 12-LEAD: ICD-10-PCS | Mod: ,,, | Performed by: INTERNAL MEDICINE

## 2021-05-29 PROCEDURE — 82803 BLOOD GASES ANY COMBINATION: CPT

## 2021-05-29 PROCEDURE — 99900035 HC TECH TIME PER 15 MIN (STAT)

## 2021-05-29 PROCEDURE — 83880 ASSAY OF NATRIURETIC PEPTIDE: CPT | Performed by: EMERGENCY MEDICINE

## 2021-05-29 PROCEDURE — U0002 COVID-19 LAB TEST NON-CDC: HCPCS | Performed by: EMERGENCY MEDICINE

## 2021-05-29 PROCEDURE — 94640 AIRWAY INHALATION TREATMENT: CPT

## 2021-05-29 PROCEDURE — 93005 ELECTROCARDIOGRAM TRACING: CPT

## 2021-05-29 PROCEDURE — 83735 ASSAY OF MAGNESIUM: CPT | Performed by: EMERGENCY MEDICINE

## 2021-05-29 PROCEDURE — 93010 ELECTROCARDIOGRAM REPORT: CPT | Mod: ,,, | Performed by: INTERNAL MEDICINE

## 2021-05-29 PROCEDURE — 84484 ASSAY OF TROPONIN QUANT: CPT | Performed by: EMERGENCY MEDICINE

## 2021-05-29 PROCEDURE — 99284 PR EMERGENCY DEPT VISIT,LEVEL IV: ICD-10-PCS | Mod: CS,,, | Performed by: EMERGENCY MEDICINE

## 2021-05-29 PROCEDURE — 99284 EMERGENCY DEPT VISIT MOD MDM: CPT | Mod: CS,,, | Performed by: EMERGENCY MEDICINE

## 2021-05-29 PROCEDURE — 25000242 PHARM REV CODE 250 ALT 637 W/ HCPCS: Performed by: EMERGENCY MEDICINE

## 2021-05-29 RX ORDER — IPRATROPIUM BROMIDE AND ALBUTEROL SULFATE 2.5; .5 MG/3ML; MG/3ML
3 SOLUTION RESPIRATORY (INHALATION)
Status: DISCONTINUED | OUTPATIENT
Start: 2021-05-29 | End: 2021-05-29

## 2021-05-29 RX ORDER — ALBUTEROL SULFATE 2.5 MG/.5ML
2.5 SOLUTION RESPIRATORY (INHALATION)
Status: DISCONTINUED | OUTPATIENT
Start: 2021-05-29 | End: 2021-05-29

## 2021-05-29 RX ORDER — PREDNISONE 20 MG/1
60 TABLET ORAL
Status: COMPLETED | OUTPATIENT
Start: 2021-05-29 | End: 2021-05-29

## 2021-05-29 RX ORDER — ATORVASTATIN CALCIUM 20 MG/1
40 TABLET, FILM COATED ORAL DAILY
Status: DISCONTINUED | OUTPATIENT
Start: 2021-05-30 | End: 2021-05-31 | Stop reason: HOSPADM

## 2021-05-29 RX ORDER — POLYETHYLENE GLYCOL 3350 17 G/17G
17 POWDER, FOR SOLUTION ORAL DAILY
Status: DISCONTINUED | OUTPATIENT
Start: 2021-05-30 | End: 2021-05-31 | Stop reason: HOSPADM

## 2021-05-29 RX ORDER — PREDNISONE 20 MG/1
60 TABLET ORAL DAILY
Status: DISCONTINUED | OUTPATIENT
Start: 2021-05-30 | End: 2021-05-31 | Stop reason: HOSPADM

## 2021-05-29 RX ORDER — SODIUM CHLORIDE 0.9 % (FLUSH) 0.9 %
10 SYRINGE (ML) INJECTION
Status: CANCELLED | OUTPATIENT
Start: 2021-05-29

## 2021-05-29 RX ORDER — BISACODYL 10 MG
10 SUPPOSITORY, RECTAL RECTAL DAILY PRN
Status: DISCONTINUED | OUTPATIENT
Start: 2021-05-30 | End: 2021-05-31 | Stop reason: HOSPADM

## 2021-05-29 RX ORDER — ASPIRIN 81 MG/1
81 TABLET ORAL DAILY
Status: DISCONTINUED | OUTPATIENT
Start: 2021-05-30 | End: 2021-05-31 | Stop reason: HOSPADM

## 2021-05-29 RX ORDER — HYDROCODONE BITARTRATE AND ACETAMINOPHEN 5; 325 MG/1; MG/1
1 TABLET ORAL EVERY 6 HOURS PRN
Status: DISCONTINUED | OUTPATIENT
Start: 2021-05-30 | End: 2021-05-31 | Stop reason: HOSPADM

## 2021-05-29 RX ORDER — AMLODIPINE BESYLATE 5 MG/1
5 TABLET ORAL DAILY
Status: DISCONTINUED | OUTPATIENT
Start: 2021-05-30 | End: 2021-05-31 | Stop reason: HOSPADM

## 2021-05-29 RX ORDER — IBUPROFEN 200 MG
24 TABLET ORAL
Status: DISCONTINUED | OUTPATIENT
Start: 2021-05-30 | End: 2021-05-31 | Stop reason: HOSPADM

## 2021-05-29 RX ORDER — TALC
6 POWDER (GRAM) TOPICAL NIGHTLY PRN
Status: CANCELLED | OUTPATIENT
Start: 2021-05-29

## 2021-05-29 RX ORDER — LOSARTAN POTASSIUM AND HYDROCHLOROTHIAZIDE 12.5; 1 MG/1; MG/1
1 TABLET ORAL DAILY
Status: DISCONTINUED | OUTPATIENT
Start: 2021-05-30 | End: 2021-05-31 | Stop reason: HOSPADM

## 2021-05-29 RX ORDER — GLUCAGON 1 MG
1 KIT INJECTION
Status: DISCONTINUED | OUTPATIENT
Start: 2021-05-30 | End: 2021-05-31 | Stop reason: HOSPADM

## 2021-05-29 RX ORDER — IBUPROFEN 200 MG
16 TABLET ORAL
Status: DISCONTINUED | OUTPATIENT
Start: 2021-05-30 | End: 2021-05-31 | Stop reason: HOSPADM

## 2021-05-29 RX ORDER — ACETAMINOPHEN 325 MG/1
650 TABLET ORAL EVERY 4 HOURS PRN
Status: DISCONTINUED | OUTPATIENT
Start: 2021-05-30 | End: 2021-05-31 | Stop reason: HOSPADM

## 2021-05-29 RX ORDER — TALC
6 POWDER (GRAM) TOPICAL NIGHTLY PRN
Status: DISCONTINUED | OUTPATIENT
Start: 2021-05-30 | End: 2021-05-31 | Stop reason: HOSPADM

## 2021-05-29 RX ORDER — SODIUM CHLORIDE 0.9 % (FLUSH) 0.9 %
5 SYRINGE (ML) INJECTION
Status: DISCONTINUED | OUTPATIENT
Start: 2021-05-30 | End: 2021-05-31 | Stop reason: HOSPADM

## 2021-05-29 RX ORDER — LEVALBUTEROL 1.25 MG/.5ML
1.25 SOLUTION, CONCENTRATE RESPIRATORY (INHALATION)
Status: COMPLETED | OUTPATIENT
Start: 2021-05-29 | End: 2021-05-29

## 2021-05-29 RX ORDER — ENOXAPARIN SODIUM 100 MG/ML
40 INJECTION SUBCUTANEOUS EVERY 24 HOURS
Status: DISCONTINUED | OUTPATIENT
Start: 2021-05-30 | End: 2021-05-31 | Stop reason: HOSPADM

## 2021-05-29 RX ORDER — ONDANSETRON 8 MG/1
8 TABLET, ORALLY DISINTEGRATING ORAL EVERY 8 HOURS PRN
Status: DISCONTINUED | OUTPATIENT
Start: 2021-05-30 | End: 2021-05-31 | Stop reason: HOSPADM

## 2021-05-29 RX ORDER — LEVALBUTEROL 1.25 MG/.5ML
1.25 SOLUTION, CONCENTRATE RESPIRATORY (INHALATION) EVERY 8 HOURS
Status: DISCONTINUED | OUTPATIENT
Start: 2021-05-30 | End: 2021-05-30

## 2021-05-29 RX ADMIN — LEVALBUTEROL 1.25 MG: 1.25 SOLUTION, CONCENTRATE RESPIRATORY (INHALATION) at 08:05

## 2021-05-29 RX ADMIN — PREDNISONE 60 MG: 20 TABLET ORAL at 08:05

## 2021-05-30 PROBLEM — J69.0 ASPIRATION PNEUMONIA: Status: ACTIVE | Noted: 2021-05-30

## 2021-05-30 PROBLEM — Z60.9 POOR SOCIAL SITUATION: Status: ACTIVE | Noted: 2021-05-30

## 2021-05-30 PROBLEM — Z75.8 DISCHARGE PLANNING ISSUES: Status: ACTIVE | Noted: 2021-05-30

## 2021-05-30 LAB
ANION GAP SERPL CALC-SCNC: 12 MMOL/L (ref 8–16)
BASOPHILS # BLD AUTO: 0.01 K/UL (ref 0–0.2)
BASOPHILS NFR BLD: 0.2 % (ref 0–1.9)
BUN SERPL-MCNC: 12 MG/DL (ref 6–20)
CALCIUM SERPL-MCNC: 8.8 MG/DL (ref 8.7–10.5)
CHLORIDE SERPL-SCNC: 99 MMOL/L (ref 95–110)
CO2 SERPL-SCNC: 27 MMOL/L (ref 23–29)
CREAT SERPL-MCNC: 0.8 MG/DL (ref 0.5–1.4)
DIFFERENTIAL METHOD: ABNORMAL
EOSINOPHIL # BLD AUTO: 0 K/UL (ref 0–0.5)
EOSINOPHIL NFR BLD: 0 % (ref 0–8)
ERYTHROCYTE [DISTWIDTH] IN BLOOD BY AUTOMATED COUNT: 19.5 % (ref 11.5–14.5)
EST. GFR  (AFRICAN AMERICAN): >60 ML/MIN/1.73 M^2
EST. GFR  (NON AFRICAN AMERICAN): >60 ML/MIN/1.73 M^2
ESTIMATED AVG GLUCOSE: 120 MG/DL (ref 68–131)
GLUCOSE SERPL-MCNC: 138 MG/DL (ref 70–110)
HBA1C MFR BLD: 5.8 % (ref 4–5.6)
HCT VFR BLD AUTO: 45.2 % (ref 40–54)
HGB BLD-MCNC: 14 G/DL (ref 14–18)
IMM GRANULOCYTES # BLD AUTO: 0.06 K/UL (ref 0–0.04)
IMM GRANULOCYTES NFR BLD AUTO: 1.3 % (ref 0–0.5)
LYMPHOCYTES # BLD AUTO: 0.4 K/UL (ref 1–4.8)
LYMPHOCYTES NFR BLD: 7.4 % (ref 18–48)
MAGNESIUM SERPL-MCNC: 2.1 MG/DL (ref 1.6–2.6)
MCH RBC QN AUTO: 26 PG (ref 27–31)
MCHC RBC AUTO-ENTMCNC: 31 G/DL (ref 32–36)
MCV RBC AUTO: 84 FL (ref 82–98)
MONOCYTES # BLD AUTO: 0.1 K/UL (ref 0.3–1)
MONOCYTES NFR BLD: 2.3 % (ref 4–15)
NEUTROPHILS # BLD AUTO: 4.2 K/UL (ref 1.8–7.7)
NEUTROPHILS NFR BLD: 88.8 % (ref 38–73)
NRBC BLD-RTO: 0 /100 WBC
PHOSPHATE SERPL-MCNC: 3.2 MG/DL (ref 2.7–4.5)
PLATELET # BLD AUTO: 246 K/UL (ref 150–450)
PMV BLD AUTO: 10.4 FL (ref 9.2–12.9)
POTASSIUM SERPL-SCNC: 5.2 MMOL/L (ref 3.5–5.1)
RBC # BLD AUTO: 5.38 M/UL (ref 4.6–6.2)
SODIUM SERPL-SCNC: 138 MMOL/L (ref 136–145)
TROPONIN I SERPL DL<=0.01 NG/ML-MCNC: 0.04 NG/ML (ref 0–0.03)
WBC # BLD AUTO: 4.74 K/UL (ref 3.9–12.7)

## 2021-05-30 PROCEDURE — 25000242 PHARM REV CODE 250 ALT 637 W/ HCPCS: Performed by: PHYSICIAN ASSISTANT

## 2021-05-30 PROCEDURE — 83036 HEMOGLOBIN GLYCOSYLATED A1C: CPT | Performed by: PHYSICIAN ASSISTANT

## 2021-05-30 PROCEDURE — 92610 EVALUATE SWALLOWING FUNCTION: CPT

## 2021-05-30 PROCEDURE — 25000242 PHARM REV CODE 250 ALT 637 W/ HCPCS: Performed by: INTERNAL MEDICINE

## 2021-05-30 PROCEDURE — 36415 COLL VENOUS BLD VENIPUNCTURE: CPT | Performed by: PHYSICIAN ASSISTANT

## 2021-05-30 PROCEDURE — 94640 AIRWAY INHALATION TREATMENT: CPT

## 2021-05-30 PROCEDURE — 99406 PR TOBACCO USE CESSATION INTERMEDIATE 3-10 MINUTES: ICD-10-PCS | Mod: ,,, | Performed by: PHYSICIAN ASSISTANT

## 2021-05-30 PROCEDURE — 99220 PR INITIAL OBSERVATION CARE,LEVL III: CPT | Mod: 25,,, | Performed by: PHYSICIAN ASSISTANT

## 2021-05-30 PROCEDURE — 96376 TX/PRO/DX INJ SAME DRUG ADON: CPT

## 2021-05-30 PROCEDURE — 84484 ASSAY OF TROPONIN QUANT: CPT | Performed by: PHYSICIAN ASSISTANT

## 2021-05-30 PROCEDURE — 63600175 PHARM REV CODE 636 W HCPCS: Performed by: PHYSICIAN ASSISTANT

## 2021-05-30 PROCEDURE — 96372 THER/PROPH/DIAG INJ SC/IM: CPT | Mod: 59

## 2021-05-30 PROCEDURE — G0378 HOSPITAL OBSERVATION PER HR: HCPCS

## 2021-05-30 PROCEDURE — S4991 NICOTINE PATCH NONLEGEND: HCPCS | Performed by: PHYSICIAN ASSISTANT

## 2021-05-30 PROCEDURE — 94761 N-INVAS EAR/PLS OXIMETRY MLT: CPT

## 2021-05-30 PROCEDURE — 99900035 HC TECH TIME PER 15 MIN (STAT)

## 2021-05-30 PROCEDURE — 25000003 PHARM REV CODE 250: Performed by: PHYSICIAN ASSISTANT

## 2021-05-30 PROCEDURE — 80048 BASIC METABOLIC PNL TOTAL CA: CPT | Performed by: PHYSICIAN ASSISTANT

## 2021-05-30 PROCEDURE — 84100 ASSAY OF PHOSPHORUS: CPT | Performed by: PHYSICIAN ASSISTANT

## 2021-05-30 PROCEDURE — 99406 BEHAV CHNG SMOKING 3-10 MIN: CPT | Mod: ,,, | Performed by: PHYSICIAN ASSISTANT

## 2021-05-30 PROCEDURE — 96374 THER/PROPH/DIAG INJ IV PUSH: CPT

## 2021-05-30 PROCEDURE — 85025 COMPLETE CBC W/AUTO DIFF WBC: CPT | Performed by: PHYSICIAN ASSISTANT

## 2021-05-30 PROCEDURE — 27000221 HC OXYGEN, UP TO 24 HOURS

## 2021-05-30 PROCEDURE — 99220 PR INITIAL OBSERVATION CARE,LEVL III: ICD-10-PCS | Mod: 25,,, | Performed by: PHYSICIAN ASSISTANT

## 2021-05-30 PROCEDURE — 94799 UNLISTED PULMONARY SVC/PX: CPT

## 2021-05-30 PROCEDURE — 83735 ASSAY OF MAGNESIUM: CPT | Performed by: PHYSICIAN ASSISTANT

## 2021-05-30 RX ORDER — THIAMINE HCL 100 MG
100 TABLET ORAL DAILY
Status: DISCONTINUED | OUTPATIENT
Start: 2021-05-30 | End: 2021-05-31 | Stop reason: HOSPADM

## 2021-05-30 RX ORDER — IPRATROPIUM BROMIDE AND ALBUTEROL SULFATE 2.5; .5 MG/3ML; MG/3ML
3 SOLUTION RESPIRATORY (INHALATION)
Status: DISCONTINUED | OUTPATIENT
Start: 2021-05-30 | End: 2021-05-30

## 2021-05-30 RX ORDER — FOLIC ACID 1 MG/1
1 TABLET ORAL DAILY
Status: DISCONTINUED | OUTPATIENT
Start: 2021-05-30 | End: 2021-05-31 | Stop reason: HOSPADM

## 2021-05-30 RX ORDER — LORAZEPAM 0.5 MG/1
2 TABLET ORAL EVERY 4 HOURS PRN
Status: DISCONTINUED | OUTPATIENT
Start: 2021-05-30 | End: 2021-05-31 | Stop reason: HOSPADM

## 2021-05-30 RX ORDER — NICOTINE 7MG/24HR
1 PATCH, TRANSDERMAL 24 HOURS TRANSDERMAL DAILY
Status: DISCONTINUED | OUTPATIENT
Start: 2021-05-30 | End: 2021-05-31 | Stop reason: HOSPADM

## 2021-05-30 RX ORDER — IPRATROPIUM BROMIDE AND ALBUTEROL SULFATE 2.5; .5 MG/3ML; MG/3ML
3 SOLUTION RESPIRATORY (INHALATION) EVERY 4 HOURS PRN
Status: DISCONTINUED | OUTPATIENT
Start: 2021-05-30 | End: 2021-05-31 | Stop reason: HOSPADM

## 2021-05-30 RX ORDER — IPRATROPIUM BROMIDE AND ALBUTEROL SULFATE 2.5; .5 MG/3ML; MG/3ML
3 SOLUTION RESPIRATORY (INHALATION)
Status: DISCONTINUED | OUTPATIENT
Start: 2021-05-30 | End: 2021-05-31 | Stop reason: HOSPADM

## 2021-05-30 RX ADMIN — NICOTINE 1 PATCH: 7 PATCH TRANSDERMAL at 09:05

## 2021-05-30 RX ADMIN — ENOXAPARIN SODIUM 40 MG: 40 INJECTION SUBCUTANEOUS at 04:05

## 2021-05-30 RX ADMIN — LEVALBUTEROL 1.25 MG: 1.25 SOLUTION, CONCENTRATE RESPIRATORY (INHALATION) at 09:05

## 2021-05-30 RX ADMIN — AMLODIPINE BESYLATE 5 MG: 5 TABLET ORAL at 09:05

## 2021-05-30 RX ADMIN — POLYETHYLENE GLYCOL 3350 17 G: 17 POWDER, FOR SOLUTION ORAL at 09:05

## 2021-05-30 RX ADMIN — ASPIRIN 81 MG: 81 TABLET, COATED ORAL at 09:05

## 2021-05-30 RX ADMIN — AMPICILLIN SODIUM AND SULBACTAM SODIUM 3 G: 2; 1 INJECTION, POWDER, FOR SOLUTION INTRAMUSCULAR; INTRAVENOUS at 03:05

## 2021-05-30 RX ADMIN — GUAIFENESIN AND DEXTROMETHORPHAN HYDROBROMIDE 2 TABLET: 600; 30 TABLET, EXTENDED RELEASE ORAL at 09:05

## 2021-05-30 RX ADMIN — ATORVASTATIN CALCIUM 40 MG: 20 TABLET, FILM COATED ORAL at 09:05

## 2021-05-30 RX ADMIN — THERA TABS 1 TABLET: TAB at 09:05

## 2021-05-30 RX ADMIN — IPRATROPIUM BROMIDE AND ALBUTEROL SULFATE 3 ML: .5; 2.5 SOLUTION RESPIRATORY (INHALATION) at 07:05

## 2021-05-30 RX ADMIN — FOLIC ACID 1 MG: 1 TABLET ORAL at 09:05

## 2021-05-30 RX ADMIN — Medication 100 MG: at 09:05

## 2021-05-30 RX ADMIN — AMPICILLIN SODIUM AND SULBACTAM SODIUM 3 G: 2; 1 INJECTION, POWDER, FOR SOLUTION INTRAMUSCULAR; INTRAVENOUS at 09:05

## 2021-05-30 RX ADMIN — PREDNISONE 60 MG: 20 TABLET ORAL at 09:05

## 2021-05-30 RX ADMIN — LOSARTAN POTASSIUM AND HYDROCHLOROTHIAZIDE 1 TABLET: 12.5; 1 TABLET ORAL at 01:05

## 2021-05-30 RX ADMIN — IPRATROPIUM BROMIDE AND ALBUTEROL SULFATE 3 ML: .5; 2.5 SOLUTION RESPIRATORY (INHALATION) at 02:05

## 2021-05-31 VITALS
OXYGEN SATURATION: 98 % | WEIGHT: 145.5 LBS | HEART RATE: 72 BPM | RESPIRATION RATE: 18 BRPM | DIASTOLIC BLOOD PRESSURE: 71 MMHG | TEMPERATURE: 98 F | SYSTOLIC BLOOD PRESSURE: 133 MMHG | BODY MASS INDEX: 22.12 KG/M2

## 2021-05-31 LAB
ANION GAP SERPL CALC-SCNC: 12 MMOL/L (ref 8–16)
BASOPHILS # BLD AUTO: 0.03 K/UL (ref 0–0.2)
BASOPHILS NFR BLD: 0.3 % (ref 0–1.9)
BUN SERPL-MCNC: 13 MG/DL (ref 6–20)
CALCIUM SERPL-MCNC: 9.1 MG/DL (ref 8.7–10.5)
CHLORIDE SERPL-SCNC: 100 MMOL/L (ref 95–110)
CO2 SERPL-SCNC: 27 MMOL/L (ref 23–29)
CREAT SERPL-MCNC: 0.9 MG/DL (ref 0.5–1.4)
DIFFERENTIAL METHOD: ABNORMAL
EOSINOPHIL # BLD AUTO: 0 K/UL (ref 0–0.5)
EOSINOPHIL NFR BLD: 0.1 % (ref 0–8)
ERYTHROCYTE [DISTWIDTH] IN BLOOD BY AUTOMATED COUNT: 19.5 % (ref 11.5–14.5)
EST. GFR  (AFRICAN AMERICAN): >60 ML/MIN/1.73 M^2
EST. GFR  (NON AFRICAN AMERICAN): >60 ML/MIN/1.73 M^2
GLUCOSE SERPL-MCNC: 104 MG/DL (ref 70–110)
HCT VFR BLD AUTO: 44.7 % (ref 40–54)
HGB BLD-MCNC: 14.2 G/DL (ref 14–18)
IMM GRANULOCYTES # BLD AUTO: 0.1 K/UL (ref 0–0.04)
IMM GRANULOCYTES NFR BLD AUTO: 1.1 % (ref 0–0.5)
LYMPHOCYTES # BLD AUTO: 1.1 K/UL (ref 1–4.8)
LYMPHOCYTES NFR BLD: 12.4 % (ref 18–48)
MAGNESIUM SERPL-MCNC: 2.2 MG/DL (ref 1.6–2.6)
MCH RBC QN AUTO: 26.6 PG (ref 27–31)
MCHC RBC AUTO-ENTMCNC: 31.8 G/DL (ref 32–36)
MCV RBC AUTO: 84 FL (ref 82–98)
MONOCYTES # BLD AUTO: 1.1 K/UL (ref 0.3–1)
MONOCYTES NFR BLD: 12.1 % (ref 4–15)
NEUTROPHILS # BLD AUTO: 6.7 K/UL (ref 1.8–7.7)
NEUTROPHILS NFR BLD: 74 % (ref 38–73)
NRBC BLD-RTO: 0 /100 WBC
PHOSPHATE SERPL-MCNC: 2.9 MG/DL (ref 2.7–4.5)
PLATELET # BLD AUTO: 219 K/UL (ref 150–450)
PMV BLD AUTO: 11.4 FL (ref 9.2–12.9)
POTASSIUM SERPL-SCNC: 4.6 MMOL/L (ref 3.5–5.1)
RBC # BLD AUTO: 5.33 M/UL (ref 4.6–6.2)
SODIUM SERPL-SCNC: 139 MMOL/L (ref 136–145)
WBC # BLD AUTO: 9.04 K/UL (ref 3.9–12.7)

## 2021-05-31 PROCEDURE — 83735 ASSAY OF MAGNESIUM: CPT | Performed by: PHYSICIAN ASSISTANT

## 2021-05-31 PROCEDURE — 36415 COLL VENOUS BLD VENIPUNCTURE: CPT | Performed by: PHYSICIAN ASSISTANT

## 2021-05-31 PROCEDURE — 27000221 HC OXYGEN, UP TO 24 HOURS

## 2021-05-31 PROCEDURE — 94640 AIRWAY INHALATION TREATMENT: CPT

## 2021-05-31 PROCEDURE — 99226 PR SUBSEQUENT OBSERVATION CARE,LEVEL III: CPT | Mod: ,,, | Performed by: PHYSICIAN ASSISTANT

## 2021-05-31 PROCEDURE — 96376 TX/PRO/DX INJ SAME DRUG ADON: CPT

## 2021-05-31 PROCEDURE — S4991 NICOTINE PATCH NONLEGEND: HCPCS | Performed by: PHYSICIAN ASSISTANT

## 2021-05-31 PROCEDURE — 63600175 PHARM REV CODE 636 W HCPCS: Performed by: PHYSICIAN ASSISTANT

## 2021-05-31 PROCEDURE — G0378 HOSPITAL OBSERVATION PER HR: HCPCS

## 2021-05-31 PROCEDURE — 99226 PR SUBSEQUENT OBSERVATION CARE,LEVEL III: ICD-10-PCS | Mod: ,,, | Performed by: PHYSICIAN ASSISTANT

## 2021-05-31 PROCEDURE — 25000242 PHARM REV CODE 250 ALT 637 W/ HCPCS: Performed by: INTERNAL MEDICINE

## 2021-05-31 PROCEDURE — 99900035 HC TECH TIME PER 15 MIN (STAT)

## 2021-05-31 PROCEDURE — 25000003 PHARM REV CODE 250: Performed by: PHYSICIAN ASSISTANT

## 2021-05-31 PROCEDURE — 80048 BASIC METABOLIC PNL TOTAL CA: CPT | Performed by: PHYSICIAN ASSISTANT

## 2021-05-31 PROCEDURE — 84100 ASSAY OF PHOSPHORUS: CPT | Performed by: PHYSICIAN ASSISTANT

## 2021-05-31 PROCEDURE — 85025 COMPLETE CBC W/AUTO DIFF WBC: CPT | Performed by: PHYSICIAN ASSISTANT

## 2021-05-31 PROCEDURE — 94761 N-INVAS EAR/PLS OXIMETRY MLT: CPT

## 2021-05-31 RX ORDER — NICOTINE 7MG/24HR
1 PATCH, TRANSDERMAL 24 HOURS TRANSDERMAL DAILY
Qty: 90 PATCH | Refills: 0 | Status: SHIPPED | OUTPATIENT
Start: 2021-06-01 | End: 2022-02-09

## 2021-05-31 RX ORDER — PREDNISONE 20 MG/1
60 TABLET ORAL DAILY
Qty: 12 TABLET | Refills: 0 | Status: SHIPPED | OUTPATIENT
Start: 2021-06-01 | End: 2021-06-05

## 2021-05-31 RX ORDER — LANOLIN ALCOHOL/MO/W.PET/CERES
100 CREAM (GRAM) TOPICAL DAILY
Qty: 90 TABLET | Refills: 0 | Status: SHIPPED | OUTPATIENT
Start: 2021-06-01 | End: 2022-02-09

## 2021-05-31 RX ORDER — FOLIC ACID 1 MG/1
1 TABLET ORAL DAILY
Qty: 90 TABLET | Refills: 0 | Status: SHIPPED | OUTPATIENT
Start: 2021-06-01 | End: 2022-02-09

## 2021-05-31 RX ORDER — LEVOFLOXACIN 750 MG/1
750 TABLET ORAL DAILY
Qty: 5 TABLET | Refills: 0 | Status: SHIPPED | OUTPATIENT
Start: 2021-05-31 | End: 2021-06-05

## 2021-05-31 RX ORDER — IPRATROPIUM BROMIDE AND ALBUTEROL SULFATE 2.5; .5 MG/3ML; MG/3ML
3 SOLUTION RESPIRATORY (INHALATION) EVERY 4 HOURS PRN
Qty: 180 ML | Refills: 3 | Status: ON HOLD | OUTPATIENT
Start: 2021-05-31 | End: 2022-01-16 | Stop reason: SDUPTHER

## 2021-05-31 RX ADMIN — THERA TABS 1 TABLET: TAB at 10:05

## 2021-05-31 RX ADMIN — IPRATROPIUM BROMIDE AND ALBUTEROL SULFATE 3 ML: .5; 2.5 SOLUTION RESPIRATORY (INHALATION) at 09:05

## 2021-05-31 RX ADMIN — ATORVASTATIN CALCIUM 40 MG: 20 TABLET, FILM COATED ORAL at 10:05

## 2021-05-31 RX ADMIN — AMPICILLIN SODIUM AND SULBACTAM SODIUM 3 G: 2; 1 INJECTION, POWDER, FOR SOLUTION INTRAMUSCULAR; INTRAVENOUS at 12:05

## 2021-05-31 RX ADMIN — AMLODIPINE BESYLATE 5 MG: 5 TABLET ORAL at 10:05

## 2021-05-31 RX ADMIN — FOLIC ACID 1 MG: 1 TABLET ORAL at 10:05

## 2021-05-31 RX ADMIN — AMPICILLIN SODIUM AND SULBACTAM SODIUM 3 G: 2; 1 INJECTION, POWDER, FOR SOLUTION INTRAMUSCULAR; INTRAVENOUS at 05:05

## 2021-05-31 RX ADMIN — POLYETHYLENE GLYCOL 3350 17 G: 17 POWDER, FOR SOLUTION ORAL at 10:05

## 2021-05-31 RX ADMIN — GUAIFENESIN AND DEXTROMETHORPHAN HYDROBROMIDE 2 TABLET: 600; 30 TABLET, EXTENDED RELEASE ORAL at 10:05

## 2021-05-31 RX ADMIN — PREDNISONE 60 MG: 20 TABLET ORAL at 10:05

## 2021-05-31 RX ADMIN — NICOTINE 1 PATCH: 7 PATCH TRANSDERMAL at 10:05

## 2021-05-31 RX ADMIN — Medication 100 MG: at 10:05

## 2021-05-31 RX ADMIN — LOSARTAN POTASSIUM AND HYDROCHLOROTHIAZIDE 1 TABLET: 12.5; 1 TABLET ORAL at 10:05

## 2021-05-31 RX ADMIN — ASPIRIN 81 MG: 81 TABLET, COATED ORAL at 10:05

## 2021-06-03 ENCOUNTER — HOSPITAL ENCOUNTER (OUTPATIENT)
Facility: HOSPITAL | Age: 61
Discharge: HOME | End: 2021-06-05
Attending: EMERGENCY MEDICINE | Admitting: INTERNAL MEDICINE
Payer: MEDICAID

## 2021-06-03 ENCOUNTER — PATIENT OUTREACH (OUTPATIENT)
Dept: EMERGENCY MEDICINE | Facility: HOSPITAL | Age: 61
End: 2021-06-03

## 2021-06-03 DIAGNOSIS — J96.21 ACUTE ON CHRONIC RESPIRATORY FAILURE WITH HYPOXIA AND HYPERCAPNIA: Primary | ICD-10-CM

## 2021-06-03 DIAGNOSIS — J44.1 COPD EXACERBATION: ICD-10-CM

## 2021-06-03 DIAGNOSIS — R06.02 SOB (SHORTNESS OF BREATH): ICD-10-CM

## 2021-06-03 DIAGNOSIS — J44.9 CHRONIC OBSTRUCTIVE PULMONARY DISEASE, UNSPECIFIED COPD TYPE: ICD-10-CM

## 2021-06-03 DIAGNOSIS — I50.32 CHRONIC DIASTOLIC HEART FAILURE: ICD-10-CM

## 2021-06-03 DIAGNOSIS — J96.22 ACUTE ON CHRONIC RESPIRATORY FAILURE WITH HYPOXIA AND HYPERCAPNIA: Primary | ICD-10-CM

## 2021-06-03 DIAGNOSIS — R07.9 CHEST PAIN: ICD-10-CM

## 2021-06-03 DIAGNOSIS — Z60.9 POOR SOCIAL SITUATION: ICD-10-CM

## 2021-06-03 DIAGNOSIS — I50.42 CHRONIC COMBINED SYSTOLIC AND DIASTOLIC CONGESTIVE HEART FAILURE: ICD-10-CM

## 2021-06-03 LAB
ALBUMIN SERPL BCP-MCNC: 3.3 G/DL (ref 3.5–5.2)
ALP SERPL-CCNC: 92 U/L (ref 55–135)
ALT SERPL W/O P-5'-P-CCNC: 16 U/L (ref 10–44)
ANION GAP SERPL CALC-SCNC: 14 MMOL/L (ref 8–16)
AST SERPL-CCNC: 18 U/L (ref 10–40)
BASOPHILS # BLD AUTO: 0.04 K/UL (ref 0–0.2)
BASOPHILS NFR BLD: 0.3 % (ref 0–1.9)
BILIRUB SERPL-MCNC: 0.3 MG/DL (ref 0.1–1)
BNP SERPL-MCNC: 304 PG/ML (ref 0–99)
BUN SERPL-MCNC: 12 MG/DL (ref 6–30)
BUN SERPL-MCNC: 13 MG/DL (ref 6–20)
CALCIUM SERPL-MCNC: 8.8 MG/DL (ref 8.7–10.5)
CHLORIDE SERPL-SCNC: 100 MMOL/L (ref 95–110)
CHLORIDE SERPL-SCNC: 101 MMOL/L (ref 95–110)
CO2 SERPL-SCNC: 22 MMOL/L (ref 23–29)
CREAT SERPL-MCNC: 1 MG/DL (ref 0.5–1.4)
CREAT SERPL-MCNC: 1.2 MG/DL (ref 0.5–1.4)
CTP QC/QA: YES
DIFFERENTIAL METHOD: ABNORMAL
EOSINOPHIL # BLD AUTO: 0.1 K/UL (ref 0–0.5)
EOSINOPHIL NFR BLD: 0.7 % (ref 0–8)
ERYTHROCYTE [DISTWIDTH] IN BLOOD BY AUTOMATED COUNT: 20.6 % (ref 11.5–14.5)
EST. GFR  (AFRICAN AMERICAN): >60 ML/MIN/1.73 M^2
EST. GFR  (NON AFRICAN AMERICAN): >60 ML/MIN/1.73 M^2
GLUCOSE SERPL-MCNC: 181 MG/DL (ref 70–110)
GLUCOSE SERPL-MCNC: 187 MG/DL (ref 70–110)
GLUCOSE SERPL-MCNC: 63 MG/DL (ref 70–110)
HCT VFR BLD AUTO: 45.1 % (ref 40–54)
HCT VFR BLD CALC: 44 %PCV (ref 36–54)
HGB BLD-MCNC: 14.3 G/DL (ref 14–18)
IMM GRANULOCYTES # BLD AUTO: 0.07 K/UL (ref 0–0.04)
IMM GRANULOCYTES NFR BLD AUTO: 0.6 % (ref 0–0.5)
LACTATE SERPL-SCNC: 1.5 MMOL/L (ref 0.5–2.2)
LYMPHOCYTES # BLD AUTO: 3.2 K/UL (ref 1–4.8)
LYMPHOCYTES NFR BLD: 25.8 % (ref 18–48)
MCH RBC QN AUTO: 26.2 PG (ref 27–31)
MCHC RBC AUTO-ENTMCNC: 31.7 G/DL (ref 32–36)
MCV RBC AUTO: 83 FL (ref 82–98)
MONOCYTES # BLD AUTO: 1.2 K/UL (ref 0.3–1)
MONOCYTES NFR BLD: 9.3 % (ref 4–15)
NEUTROPHILS # BLD AUTO: 7.8 K/UL (ref 1.8–7.7)
NEUTROPHILS NFR BLD: 63.3 % (ref 38–73)
NRBC BLD-RTO: 0 /100 WBC
PLATELET # BLD AUTO: 364 K/UL (ref 150–450)
PMV BLD AUTO: 9.7 FL (ref 9.2–12.9)
POC IONIZED CALCIUM: 1.08 MMOL/L (ref 1.06–1.42)
POC TCO2 (MEASURED): 28 MMOL/L (ref 23–29)
POCT GLUCOSE: 181 MG/DL (ref 70–110)
POCT GLUCOSE: 287 MG/DL (ref 70–110)
POTASSIUM BLD-SCNC: 4 MMOL/L (ref 3.5–5.1)
POTASSIUM SERPL-SCNC: 3.6 MMOL/L (ref 3.5–5.1)
PROT SERPL-MCNC: 7.2 G/DL (ref 6–8.4)
RBC # BLD AUTO: 5.45 M/UL (ref 4.6–6.2)
SAMPLE: ABNORMAL
SARS-COV-2 RDRP RESP QL NAA+PROBE: NEGATIVE
SODIUM BLD-SCNC: 136 MMOL/L (ref 136–145)
SODIUM SERPL-SCNC: 137 MMOL/L (ref 136–145)
TROPONIN I SERPL DL<=0.01 NG/ML-MCNC: 0.06 NG/ML (ref 0–0.03)
WBC # BLD AUTO: 12.38 K/UL (ref 3.9–12.7)

## 2021-06-03 PROCEDURE — 27000221 HC OXYGEN, UP TO 24 HOURS

## 2021-06-03 PROCEDURE — 80047 BASIC METABLC PNL IONIZED CA: CPT

## 2021-06-03 PROCEDURE — 96366 THER/PROPH/DIAG IV INF ADDON: CPT

## 2021-06-03 PROCEDURE — 25000003 PHARM REV CODE 250: Performed by: PHYSICIAN ASSISTANT

## 2021-06-03 PROCEDURE — 93005 ELECTROCARDIOGRAM TRACING: CPT

## 2021-06-03 PROCEDURE — 99291 CRITICAL CARE FIRST HOUR: CPT | Mod: 25

## 2021-06-03 PROCEDURE — G0378 HOSPITAL OBSERVATION PER HR: HCPCS

## 2021-06-03 PROCEDURE — 94640 AIRWAY INHALATION TREATMENT: CPT

## 2021-06-03 PROCEDURE — 63600175 PHARM REV CODE 636 W HCPCS: Performed by: PHYSICIAN ASSISTANT

## 2021-06-03 PROCEDURE — 99220 PR INITIAL OBSERVATION CARE,LEVL III: ICD-10-PCS | Mod: ,,, | Performed by: PHYSICIAN ASSISTANT

## 2021-06-03 PROCEDURE — 83880 ASSAY OF NATRIURETIC PEPTIDE: CPT | Performed by: PHYSICIAN ASSISTANT

## 2021-06-03 PROCEDURE — 84484 ASSAY OF TROPONIN QUANT: CPT | Performed by: PHYSICIAN ASSISTANT

## 2021-06-03 PROCEDURE — 93010 EKG 12-LEAD: ICD-10-PCS | Mod: ,,, | Performed by: INTERNAL MEDICINE

## 2021-06-03 PROCEDURE — 99291 CRITICAL CARE FIRST HOUR: CPT | Mod: ,,, | Performed by: EMERGENCY MEDICINE

## 2021-06-03 PROCEDURE — 25000242 PHARM REV CODE 250 ALT 637 W/ HCPCS: Performed by: PHYSICIAN ASSISTANT

## 2021-06-03 PROCEDURE — U0002 COVID-19 LAB TEST NON-CDC: HCPCS | Performed by: PHYSICIAN ASSISTANT

## 2021-06-03 PROCEDURE — 99291 PR CRITICAL CARE, E/M 30-74 MINUTES: ICD-10-PCS | Mod: ,,, | Performed by: EMERGENCY MEDICINE

## 2021-06-03 PROCEDURE — 94761 N-INVAS EAR/PLS OXIMETRY MLT: CPT

## 2021-06-03 PROCEDURE — 83605 ASSAY OF LACTIC ACID: CPT | Performed by: PHYSICIAN ASSISTANT

## 2021-06-03 PROCEDURE — 80053 COMPREHEN METABOLIC PANEL: CPT | Performed by: PHYSICIAN ASSISTANT

## 2021-06-03 PROCEDURE — 93010 ELECTROCARDIOGRAM REPORT: CPT | Mod: ,,, | Performed by: INTERNAL MEDICINE

## 2021-06-03 PROCEDURE — 96375 TX/PRO/DX INJ NEW DRUG ADDON: CPT

## 2021-06-03 PROCEDURE — 82330 ASSAY OF CALCIUM: CPT

## 2021-06-03 PROCEDURE — 96365 THER/PROPH/DIAG IV INF INIT: CPT

## 2021-06-03 PROCEDURE — S4991 NICOTINE PATCH NONLEGEND: HCPCS | Performed by: PHYSICIAN ASSISTANT

## 2021-06-03 PROCEDURE — 99220 PR INITIAL OBSERVATION CARE,LEVL III: CPT | Mod: ,,, | Performed by: PHYSICIAN ASSISTANT

## 2021-06-03 PROCEDURE — 85025 COMPLETE CBC W/AUTO DIFF WBC: CPT | Performed by: PHYSICIAN ASSISTANT

## 2021-06-03 PROCEDURE — 82962 GLUCOSE BLOOD TEST: CPT

## 2021-06-03 PROCEDURE — 96372 THER/PROPH/DIAG INJ SC/IM: CPT | Mod: 59

## 2021-06-03 RX ORDER — ACETAMINOPHEN 325 MG/1
650 TABLET ORAL EVERY 4 HOURS PRN
Status: DISCONTINUED | OUTPATIENT
Start: 2021-06-03 | End: 2021-06-05 | Stop reason: HOSPADM

## 2021-06-03 RX ORDER — ONDANSETRON 8 MG/1
8 TABLET, ORALLY DISINTEGRATING ORAL EVERY 8 HOURS PRN
Status: DISCONTINUED | OUTPATIENT
Start: 2021-06-03 | End: 2021-06-05 | Stop reason: HOSPADM

## 2021-06-03 RX ORDER — METHYLPREDNISOLONE SOD SUCC 125 MG
125 VIAL (EA) INJECTION
Status: COMPLETED | OUTPATIENT
Start: 2021-06-03 | End: 2021-06-03

## 2021-06-03 RX ORDER — IBUPROFEN 200 MG
16 TABLET ORAL
Status: DISCONTINUED | OUTPATIENT
Start: 2021-06-03 | End: 2021-06-05 | Stop reason: HOSPADM

## 2021-06-03 RX ORDER — POLYETHYLENE GLYCOL 3350 17 G/17G
17 POWDER, FOR SOLUTION ORAL DAILY
Status: DISCONTINUED | OUTPATIENT
Start: 2021-06-03 | End: 2021-06-05 | Stop reason: HOSPADM

## 2021-06-03 RX ORDER — IPRATROPIUM BROMIDE AND ALBUTEROL SULFATE 2.5; .5 MG/3ML; MG/3ML
3 SOLUTION RESPIRATORY (INHALATION)
Status: COMPLETED | OUTPATIENT
Start: 2021-06-03 | End: 2021-06-03

## 2021-06-03 RX ORDER — IPRATROPIUM BROMIDE AND ALBUTEROL SULFATE 2.5; .5 MG/3ML; MG/3ML
SOLUTION RESPIRATORY (INHALATION)
Status: DISPENSED
Start: 2021-06-03 | End: 2021-06-03

## 2021-06-03 RX ORDER — FOLIC ACID 1 MG/1
1 TABLET ORAL DAILY
Status: DISCONTINUED | OUTPATIENT
Start: 2021-06-03 | End: 2021-06-05 | Stop reason: HOSPADM

## 2021-06-03 RX ORDER — TALC
6 POWDER (GRAM) TOPICAL NIGHTLY PRN
Status: DISCONTINUED | OUTPATIENT
Start: 2021-06-03 | End: 2021-06-05 | Stop reason: HOSPADM

## 2021-06-03 RX ORDER — SODIUM CHLORIDE 0.9 % (FLUSH) 0.9 %
5 SYRINGE (ML) INJECTION
Status: DISCONTINUED | OUTPATIENT
Start: 2021-06-03 | End: 2021-06-05 | Stop reason: HOSPADM

## 2021-06-03 RX ORDER — IBUPROFEN 200 MG
24 TABLET ORAL
Status: DISCONTINUED | OUTPATIENT
Start: 2021-06-03 | End: 2021-06-05 | Stop reason: HOSPADM

## 2021-06-03 RX ORDER — THIAMINE HCL 100 MG
100 TABLET ORAL DAILY
Status: DISCONTINUED | OUTPATIENT
Start: 2021-06-03 | End: 2021-06-05 | Stop reason: HOSPADM

## 2021-06-03 RX ORDER — HYDROCODONE BITARTRATE AND ACETAMINOPHEN 5; 325 MG/1; MG/1
1 TABLET ORAL EVERY 6 HOURS PRN
Status: DISCONTINUED | OUTPATIENT
Start: 2021-06-03 | End: 2021-06-05 | Stop reason: HOSPADM

## 2021-06-03 RX ORDER — TALC
6 POWDER (GRAM) TOPICAL NIGHTLY PRN
Status: DISCONTINUED | OUTPATIENT
Start: 2021-06-03 | End: 2021-06-03

## 2021-06-03 RX ORDER — ATORVASTATIN CALCIUM 40 MG/1
40 TABLET, FILM COATED ORAL DAILY
Status: DISCONTINUED | OUTPATIENT
Start: 2021-06-03 | End: 2021-06-05 | Stop reason: HOSPADM

## 2021-06-03 RX ORDER — ASPIRIN 81 MG/1
81 TABLET ORAL DAILY
Status: DISCONTINUED | OUTPATIENT
Start: 2021-06-03 | End: 2021-06-05 | Stop reason: HOSPADM

## 2021-06-03 RX ORDER — AMLODIPINE BESYLATE 5 MG/1
5 TABLET ORAL DAILY
Status: DISCONTINUED | OUTPATIENT
Start: 2021-06-03 | End: 2021-06-05 | Stop reason: HOSPADM

## 2021-06-03 RX ORDER — SODIUM CHLORIDE 0.9 % (FLUSH) 0.9 %
10 SYRINGE (ML) INJECTION
Status: DISCONTINUED | OUTPATIENT
Start: 2021-06-03 | End: 2021-06-05 | Stop reason: HOSPADM

## 2021-06-03 RX ORDER — MAGNESIUM SULFATE HEPTAHYDRATE 40 MG/ML
2 INJECTION, SOLUTION INTRAVENOUS
Status: COMPLETED | OUTPATIENT
Start: 2021-06-03 | End: 2021-06-03

## 2021-06-03 RX ORDER — IPRATROPIUM BROMIDE AND ALBUTEROL SULFATE 2.5; .5 MG/3ML; MG/3ML
3 SOLUTION RESPIRATORY (INHALATION) EVERY 4 HOURS PRN
Status: DISCONTINUED | OUTPATIENT
Start: 2021-06-03 | End: 2021-06-05 | Stop reason: HOSPADM

## 2021-06-03 RX ORDER — GLUCAGON 1 MG
1 KIT INJECTION
Status: DISCONTINUED | OUTPATIENT
Start: 2021-06-03 | End: 2021-06-05 | Stop reason: HOSPADM

## 2021-06-03 RX ORDER — IPRATROPIUM BROMIDE AND ALBUTEROL SULFATE 2.5; .5 MG/3ML; MG/3ML
3 SOLUTION RESPIRATORY (INHALATION)
Status: DISCONTINUED | OUTPATIENT
Start: 2021-06-03 | End: 2021-06-05 | Stop reason: HOSPADM

## 2021-06-03 RX ORDER — PREDNISONE 20 MG/1
60 TABLET ORAL DAILY
Status: COMPLETED | OUTPATIENT
Start: 2021-06-03 | End: 2021-06-04

## 2021-06-03 RX ORDER — ENOXAPARIN SODIUM 100 MG/ML
40 INJECTION SUBCUTANEOUS EVERY 24 HOURS
Status: DISCONTINUED | OUTPATIENT
Start: 2021-06-03 | End: 2021-06-05 | Stop reason: HOSPADM

## 2021-06-03 RX ORDER — NICOTINE 7MG/24HR
1 PATCH, TRANSDERMAL 24 HOURS TRANSDERMAL DAILY
Status: DISCONTINUED | OUTPATIENT
Start: 2021-06-03 | End: 2021-06-05 | Stop reason: HOSPADM

## 2021-06-03 RX ORDER — LOSARTAN POTASSIUM AND HYDROCHLOROTHIAZIDE 12.5; 1 MG/1; MG/1
1 TABLET ORAL DAILY
Status: DISCONTINUED | OUTPATIENT
Start: 2021-06-03 | End: 2021-06-05 | Stop reason: HOSPADM

## 2021-06-03 RX ORDER — LORAZEPAM 1 MG/1
2 TABLET ORAL EVERY 4 HOURS PRN
Status: DISCONTINUED | OUTPATIENT
Start: 2021-06-03 | End: 2021-06-05 | Stop reason: HOSPADM

## 2021-06-03 RX ORDER — BISACODYL 10 MG
10 SUPPOSITORY, RECTAL RECTAL DAILY PRN
Status: DISCONTINUED | OUTPATIENT
Start: 2021-06-03 | End: 2021-06-05 | Stop reason: HOSPADM

## 2021-06-03 RX ORDER — POLYETHYLENE GLYCOL 3350 17 G/17G
17 POWDER, FOR SOLUTION ORAL 2 TIMES DAILY PRN
Status: DISCONTINUED | OUTPATIENT
Start: 2021-06-03 | End: 2021-06-05 | Stop reason: HOSPADM

## 2021-06-03 RX ADMIN — ATORVASTATIN CALCIUM 40 MG: 40 TABLET, FILM COATED ORAL at 02:06

## 2021-06-03 RX ADMIN — Medication 1 TABLET: at 02:06

## 2021-06-03 RX ADMIN — GUAIFENESIN AND DEXTROMETHORPHAN HYDROBROMIDE 2 TABLET: 600; 30 TABLET, EXTENDED RELEASE ORAL at 11:06

## 2021-06-03 RX ADMIN — NICOTINE 1 PATCH: 7 PATCH TRANSDERMAL at 04:06

## 2021-06-03 RX ADMIN — AMLODIPINE BESYLATE 5 MG: 5 TABLET ORAL at 02:06

## 2021-06-03 RX ADMIN — IPRATROPIUM BROMIDE AND ALBUTEROL SULFATE 3 ML: .5; 2.5 SOLUTION RESPIRATORY (INHALATION) at 11:06

## 2021-06-03 RX ADMIN — LOSARTAN POTASSIUM AND HYDROCHLOROTHIAZIDE 1 TABLET: 12.5; 1 TABLET ORAL at 07:06

## 2021-06-03 RX ADMIN — METHYLPREDNISOLONE SODIUM SUCCINATE 125 MG: 125 INJECTION, POWDER, FOR SOLUTION INTRAMUSCULAR; INTRAVENOUS at 11:06

## 2021-06-03 RX ADMIN — FOLIC ACID 1 MG: 1 TABLET ORAL at 02:06

## 2021-06-03 RX ADMIN — IPRATROPIUM BROMIDE AND ALBUTEROL SULFATE 3 ML: .5; 2.5 SOLUTION RESPIRATORY (INHALATION) at 08:06

## 2021-06-03 RX ADMIN — PREDNISONE 60 MG: 20 TABLET ORAL at 02:06

## 2021-06-03 RX ADMIN — ASPIRIN 81 MG: 81 TABLET, COATED ORAL at 02:06

## 2021-06-03 RX ADMIN — ENOXAPARIN SODIUM 40 MG: 40 INJECTION SUBCUTANEOUS at 05:06

## 2021-06-03 RX ADMIN — Medication 100 MG: at 02:06

## 2021-06-03 RX ADMIN — MAGNESIUM SULFATE 2 G: 2 INJECTION INTRAVENOUS at 12:06

## 2021-06-03 RX ADMIN — POLYETHYLENE GLYCOL 3350 17 G: 17 POWDER, FOR SOLUTION ORAL at 02:06

## 2021-06-03 SDOH — SOCIAL DETERMINANTS OF HEALTH (SDOH): PROBLEM RELATED TO SOCIAL ENVIRONMENT, UNSPECIFIED: Z60.9

## 2021-06-04 LAB
ANION GAP SERPL CALC-SCNC: 10 MMOL/L (ref 8–16)
BASOPHILS # BLD AUTO: 0.01 K/UL (ref 0–0.2)
BASOPHILS NFR BLD: 0.1 % (ref 0–1.9)
BUN SERPL-MCNC: 13 MG/DL (ref 6–20)
CALCIUM SERPL-MCNC: 9.2 MG/DL (ref 8.7–10.5)
CHLORIDE SERPL-SCNC: 98 MMOL/L (ref 95–110)
CO2 SERPL-SCNC: 29 MMOL/L (ref 23–29)
CREAT SERPL-MCNC: 0.9 MG/DL (ref 0.5–1.4)
DIFFERENTIAL METHOD: ABNORMAL
EOSINOPHIL # BLD AUTO: 0 K/UL (ref 0–0.5)
EOSINOPHIL NFR BLD: 0 % (ref 0–8)
ERYTHROCYTE [DISTWIDTH] IN BLOOD BY AUTOMATED COUNT: 20 % (ref 11.5–14.5)
EST. GFR  (AFRICAN AMERICAN): >60 ML/MIN/1.73 M^2
EST. GFR  (NON AFRICAN AMERICAN): >60 ML/MIN/1.73 M^2
GLUCOSE SERPL-MCNC: 122 MG/DL (ref 70–110)
HCT VFR BLD AUTO: 44.5 % (ref 40–54)
HGB BLD-MCNC: 13.9 G/DL (ref 14–18)
IMM GRANULOCYTES # BLD AUTO: 0.05 K/UL (ref 0–0.04)
IMM GRANULOCYTES NFR BLD AUTO: 0.6 % (ref 0–0.5)
LYMPHOCYTES # BLD AUTO: 0.6 K/UL (ref 1–4.8)
LYMPHOCYTES NFR BLD: 6.7 % (ref 18–48)
MAGNESIUM SERPL-MCNC: 2.1 MG/DL (ref 1.6–2.6)
MCH RBC QN AUTO: 26.4 PG (ref 27–31)
MCHC RBC AUTO-ENTMCNC: 31.2 G/DL (ref 32–36)
MCV RBC AUTO: 85 FL (ref 82–98)
MONOCYTES # BLD AUTO: 0.2 K/UL (ref 0.3–1)
MONOCYTES NFR BLD: 1.9 % (ref 4–15)
NEUTROPHILS # BLD AUTO: 7.6 K/UL (ref 1.8–7.7)
NEUTROPHILS NFR BLD: 90.7 % (ref 38–73)
NRBC BLD-RTO: 0 /100 WBC
PHOSPHATE SERPL-MCNC: 2.9 MG/DL (ref 2.7–4.5)
PLATELET # BLD AUTO: 342 K/UL (ref 150–450)
PMV BLD AUTO: 10.3 FL (ref 9.2–12.9)
POTASSIUM SERPL-SCNC: 4.3 MMOL/L (ref 3.5–5.1)
RBC # BLD AUTO: 5.26 M/UL (ref 4.6–6.2)
SODIUM SERPL-SCNC: 137 MMOL/L (ref 136–145)
WBC # BLD AUTO: 8.36 K/UL (ref 3.9–12.7)

## 2021-06-04 PROCEDURE — 99226 PR SUBSEQUENT OBSERVATION CARE,LEVEL III: ICD-10-PCS | Mod: ,,, | Performed by: PHYSICIAN ASSISTANT

## 2021-06-04 PROCEDURE — 80048 BASIC METABOLIC PNL TOTAL CA: CPT | Performed by: PHYSICIAN ASSISTANT

## 2021-06-04 PROCEDURE — 36415 COLL VENOUS BLD VENIPUNCTURE: CPT | Performed by: PHYSICIAN ASSISTANT

## 2021-06-04 PROCEDURE — 99900035 HC TECH TIME PER 15 MIN (STAT)

## 2021-06-04 PROCEDURE — 96372 THER/PROPH/DIAG INJ SC/IM: CPT

## 2021-06-04 PROCEDURE — 94761 N-INVAS EAR/PLS OXIMETRY MLT: CPT

## 2021-06-04 PROCEDURE — G0378 HOSPITAL OBSERVATION PER HR: HCPCS

## 2021-06-04 PROCEDURE — 27000221 HC OXYGEN, UP TO 24 HOURS

## 2021-06-04 PROCEDURE — 84100 ASSAY OF PHOSPHORUS: CPT | Performed by: PHYSICIAN ASSISTANT

## 2021-06-04 PROCEDURE — 94799 UNLISTED PULMONARY SVC/PX: CPT

## 2021-06-04 PROCEDURE — 25000003 PHARM REV CODE 250: Performed by: PHYSICIAN ASSISTANT

## 2021-06-04 PROCEDURE — 85025 COMPLETE CBC W/AUTO DIFF WBC: CPT | Performed by: PHYSICIAN ASSISTANT

## 2021-06-04 PROCEDURE — 25000242 PHARM REV CODE 250 ALT 637 W/ HCPCS: Performed by: PHYSICIAN ASSISTANT

## 2021-06-04 PROCEDURE — 63600175 PHARM REV CODE 636 W HCPCS: Performed by: PHYSICIAN ASSISTANT

## 2021-06-04 PROCEDURE — 99226 PR SUBSEQUENT OBSERVATION CARE,LEVEL III: CPT | Mod: ,,, | Performed by: PHYSICIAN ASSISTANT

## 2021-06-04 PROCEDURE — 94640 AIRWAY INHALATION TREATMENT: CPT

## 2021-06-04 PROCEDURE — 83735 ASSAY OF MAGNESIUM: CPT | Performed by: PHYSICIAN ASSISTANT

## 2021-06-04 PROCEDURE — S4991 NICOTINE PATCH NONLEGEND: HCPCS | Performed by: PHYSICIAN ASSISTANT

## 2021-06-04 RX ADMIN — GUAIFENESIN AND DEXTROMETHORPHAN HYDROBROMIDE 2 TABLET: 600; 30 TABLET, EXTENDED RELEASE ORAL at 08:06

## 2021-06-04 RX ADMIN — ATORVASTATIN CALCIUM 40 MG: 40 TABLET, FILM COATED ORAL at 08:06

## 2021-06-04 RX ADMIN — PREDNISONE 60 MG: 20 TABLET ORAL at 08:06

## 2021-06-04 RX ADMIN — NICOTINE 1 PATCH: 7 PATCH TRANSDERMAL at 09:06

## 2021-06-04 RX ADMIN — IPRATROPIUM BROMIDE AND ALBUTEROL SULFATE 3 ML: .5; 2.5 SOLUTION RESPIRATORY (INHALATION) at 02:06

## 2021-06-04 RX ADMIN — IPRATROPIUM BROMIDE AND ALBUTEROL SULFATE 3 ML: .5; 2.5 SOLUTION RESPIRATORY (INHALATION) at 07:06

## 2021-06-04 RX ADMIN — LOSARTAN POTASSIUM AND HYDROCHLOROTHIAZIDE 1 TABLET: 12.5; 1 TABLET ORAL at 11:06

## 2021-06-04 RX ADMIN — Medication 100 MG: at 08:06

## 2021-06-04 RX ADMIN — IPRATROPIUM BROMIDE AND ALBUTEROL SULFATE 3 ML: .5; 2.5 SOLUTION RESPIRATORY (INHALATION) at 09:06

## 2021-06-04 RX ADMIN — POLYETHYLENE GLYCOL 3350 17 G: 17 POWDER, FOR SOLUTION ORAL at 08:06

## 2021-06-04 RX ADMIN — FOLIC ACID 1 MG: 1 TABLET ORAL at 08:06

## 2021-06-04 RX ADMIN — AMLODIPINE BESYLATE 5 MG: 5 TABLET ORAL at 08:06

## 2021-06-04 RX ADMIN — Medication 1 TABLET: at 08:06

## 2021-06-04 RX ADMIN — ENOXAPARIN SODIUM 40 MG: 40 INJECTION SUBCUTANEOUS at 04:06

## 2021-06-04 RX ADMIN — ASPIRIN 81 MG: 81 TABLET, COATED ORAL at 08:06

## 2021-06-05 VITALS
SYSTOLIC BLOOD PRESSURE: 151 MMHG | HEART RATE: 63 BPM | DIASTOLIC BLOOD PRESSURE: 81 MMHG | RESPIRATION RATE: 16 BRPM | HEIGHT: 68 IN | WEIGHT: 138.88 LBS | TEMPERATURE: 97 F | BODY MASS INDEX: 21.05 KG/M2 | OXYGEN SATURATION: 97 %

## 2021-06-05 LAB
ANION GAP SERPL CALC-SCNC: 9 MMOL/L (ref 8–16)
BASOPHILS # BLD AUTO: 0.01 K/UL (ref 0–0.2)
BASOPHILS NFR BLD: 0.1 % (ref 0–1.9)
BUN SERPL-MCNC: 17 MG/DL (ref 6–20)
CALCIUM SERPL-MCNC: 9.2 MG/DL (ref 8.7–10.5)
CHLORIDE SERPL-SCNC: 99 MMOL/L (ref 95–110)
CO2 SERPL-SCNC: 29 MMOL/L (ref 23–29)
CREAT SERPL-MCNC: 0.9 MG/DL (ref 0.5–1.4)
DIFFERENTIAL METHOD: ABNORMAL
EOSINOPHIL # BLD AUTO: 0 K/UL (ref 0–0.5)
EOSINOPHIL NFR BLD: 0 % (ref 0–8)
ERYTHROCYTE [DISTWIDTH] IN BLOOD BY AUTOMATED COUNT: 19.9 % (ref 11.5–14.5)
EST. GFR  (AFRICAN AMERICAN): >60 ML/MIN/1.73 M^2
EST. GFR  (NON AFRICAN AMERICAN): >60 ML/MIN/1.73 M^2
GLUCOSE SERPL-MCNC: 90 MG/DL (ref 70–110)
HCT VFR BLD AUTO: 41.5 % (ref 40–54)
HGB BLD-MCNC: 13.2 G/DL (ref 14–18)
IMM GRANULOCYTES # BLD AUTO: 0.05 K/UL (ref 0–0.04)
IMM GRANULOCYTES NFR BLD AUTO: 0.4 % (ref 0–0.5)
LYMPHOCYTES # BLD AUTO: 1.1 K/UL (ref 1–4.8)
LYMPHOCYTES NFR BLD: 9.5 % (ref 18–48)
MAGNESIUM SERPL-MCNC: 2.1 MG/DL (ref 1.6–2.6)
MCH RBC QN AUTO: 26.6 PG (ref 27–31)
MCHC RBC AUTO-ENTMCNC: 31.8 G/DL (ref 32–36)
MCV RBC AUTO: 84 FL (ref 82–98)
MONOCYTES # BLD AUTO: 0.9 K/UL (ref 0.3–1)
MONOCYTES NFR BLD: 7.9 % (ref 4–15)
NEUTROPHILS # BLD AUTO: 9.7 K/UL (ref 1.8–7.7)
NEUTROPHILS NFR BLD: 82.1 % (ref 38–73)
NRBC BLD-RTO: 0 /100 WBC
PHOSPHATE SERPL-MCNC: 3.8 MG/DL (ref 2.7–4.5)
PLATELET # BLD AUTO: 380 K/UL (ref 150–450)
PMV BLD AUTO: 9.9 FL (ref 9.2–12.9)
POTASSIUM SERPL-SCNC: 4.1 MMOL/L (ref 3.5–5.1)
RBC # BLD AUTO: 4.97 M/UL (ref 4.6–6.2)
SODIUM SERPL-SCNC: 137 MMOL/L (ref 136–145)
WBC # BLD AUTO: 11.76 K/UL (ref 3.9–12.7)

## 2021-06-05 PROCEDURE — 85025 COMPLETE CBC W/AUTO DIFF WBC: CPT | Performed by: PHYSICIAN ASSISTANT

## 2021-06-05 PROCEDURE — 94761 N-INVAS EAR/PLS OXIMETRY MLT: CPT

## 2021-06-05 PROCEDURE — 80048 BASIC METABOLIC PNL TOTAL CA: CPT | Performed by: PHYSICIAN ASSISTANT

## 2021-06-05 PROCEDURE — 25000242 PHARM REV CODE 250 ALT 637 W/ HCPCS: Performed by: PHYSICIAN ASSISTANT

## 2021-06-05 PROCEDURE — 94799 UNLISTED PULMONARY SVC/PX: CPT

## 2021-06-05 PROCEDURE — 36415 COLL VENOUS BLD VENIPUNCTURE: CPT | Performed by: PHYSICIAN ASSISTANT

## 2021-06-05 PROCEDURE — 25000003 PHARM REV CODE 250: Performed by: PHYSICIAN ASSISTANT

## 2021-06-05 PROCEDURE — 99226 PR SUBSEQUENT OBSERVATION CARE,LEVEL III: CPT | Mod: ,,, | Performed by: PHYSICIAN ASSISTANT

## 2021-06-05 PROCEDURE — S4991 NICOTINE PATCH NONLEGEND: HCPCS | Performed by: PHYSICIAN ASSISTANT

## 2021-06-05 PROCEDURE — G0378 HOSPITAL OBSERVATION PER HR: HCPCS

## 2021-06-05 PROCEDURE — 94640 AIRWAY INHALATION TREATMENT: CPT

## 2021-06-05 PROCEDURE — 83735 ASSAY OF MAGNESIUM: CPT | Performed by: PHYSICIAN ASSISTANT

## 2021-06-05 PROCEDURE — 99226 PR SUBSEQUENT OBSERVATION CARE,LEVEL III: ICD-10-PCS | Mod: ,,, | Performed by: PHYSICIAN ASSISTANT

## 2021-06-05 PROCEDURE — 84100 ASSAY OF PHOSPHORUS: CPT | Performed by: PHYSICIAN ASSISTANT

## 2021-06-05 RX ADMIN — ASPIRIN 81 MG: 81 TABLET, COATED ORAL at 09:06

## 2021-06-05 RX ADMIN — GUAIFENESIN AND DEXTROMETHORPHAN HYDROBROMIDE 2 TABLET: 600; 30 TABLET, EXTENDED RELEASE ORAL at 09:06

## 2021-06-05 RX ADMIN — ATORVASTATIN CALCIUM 40 MG: 40 TABLET, FILM COATED ORAL at 09:06

## 2021-06-05 RX ADMIN — Medication 100 MG: at 10:06

## 2021-06-05 RX ADMIN — FOLIC ACID 1 MG: 1 TABLET ORAL at 09:06

## 2021-06-05 RX ADMIN — Medication 1 TABLET: at 09:06

## 2021-06-05 RX ADMIN — AMLODIPINE BESYLATE 5 MG: 5 TABLET ORAL at 09:06

## 2021-06-05 RX ADMIN — IPRATROPIUM BROMIDE AND ALBUTEROL SULFATE 3 ML: .5; 2.5 SOLUTION RESPIRATORY (INHALATION) at 08:06

## 2021-06-05 RX ADMIN — IPRATROPIUM BROMIDE AND ALBUTEROL SULFATE 3 ML: .5; 2.5 SOLUTION RESPIRATORY (INHALATION) at 03:06

## 2021-06-05 RX ADMIN — LOSARTAN POTASSIUM AND HYDROCHLOROTHIAZIDE 1 TABLET: 12.5; 1 TABLET ORAL at 09:06

## 2021-06-05 RX ADMIN — NICOTINE 1 PATCH: 7 PATCH TRANSDERMAL at 09:06

## 2021-06-07 ENCOUNTER — HOSPITAL ENCOUNTER (INPATIENT)
Facility: OTHER | Age: 61
LOS: 3 days | DRG: 189 | End: 2021-06-10
Attending: EMERGENCY MEDICINE | Admitting: INTERNAL MEDICINE
Payer: MEDICAID

## 2021-06-07 DIAGNOSIS — J96.21 ACUTE ON CHRONIC RESPIRATORY FAILURE WITH HYPOXIA AND HYPERCAPNIA: Primary | ICD-10-CM

## 2021-06-07 DIAGNOSIS — J44.1 COPD EXACERBATION: ICD-10-CM

## 2021-06-07 DIAGNOSIS — J96.22 ACUTE ON CHRONIC RESPIRATORY FAILURE WITH HYPOXIA AND HYPERCAPNIA: Primary | ICD-10-CM

## 2021-06-07 LAB
CTP QC/QA: YES
SARS-COV-2 RDRP RESP QL NAA+PROBE: NEGATIVE

## 2021-06-07 PROCEDURE — G0378 HOSPITAL OBSERVATION PER HR: HCPCS

## 2021-06-07 PROCEDURE — U0002 COVID-19 LAB TEST NON-CDC: HCPCS | Performed by: NURSE PRACTITIONER

## 2021-06-07 PROCEDURE — 11000001 HC ACUTE MED/SURG PRIVATE ROOM

## 2021-06-07 PROCEDURE — 99285 EMERGENCY DEPT VISIT HI MDM: CPT | Mod: 25,27

## 2021-06-07 PROCEDURE — 96372 THER/PROPH/DIAG INJ SC/IM: CPT

## 2021-06-07 RX ORDER — SODIUM CHLORIDE 0.9 % (FLUSH) 0.9 %
10 SYRINGE (ML) INJECTION
Status: DISCONTINUED | OUTPATIENT
Start: 2021-06-07 | End: 2021-06-10 | Stop reason: HOSPADM

## 2021-06-07 RX ORDER — TALC
6 POWDER (GRAM) TOPICAL NIGHTLY PRN
Status: DISCONTINUED | OUTPATIENT
Start: 2021-06-07 | End: 2021-06-10 | Stop reason: HOSPADM

## 2021-06-08 LAB
ALBUMIN SERPL BCP-MCNC: 3.2 G/DL (ref 3.5–5.2)
ALP SERPL-CCNC: 77 U/L (ref 55–135)
ALT SERPL W/O P-5'-P-CCNC: 16 U/L (ref 10–44)
ANION GAP SERPL CALC-SCNC: 6 MMOL/L (ref 8–16)
AST SERPL-CCNC: 14 U/L (ref 10–40)
BASOPHILS # BLD AUTO: 0.02 K/UL (ref 0–0.2)
BASOPHILS NFR BLD: 0.3 % (ref 0–1.9)
BILIRUB SERPL-MCNC: 0.6 MG/DL (ref 0.1–1)
BUN SERPL-MCNC: 19 MG/DL (ref 6–20)
CALCIUM SERPL-MCNC: 8.8 MG/DL (ref 8.7–10.5)
CHLORIDE SERPL-SCNC: 103 MMOL/L (ref 95–110)
CO2 SERPL-SCNC: 30 MMOL/L (ref 23–29)
CREAT SERPL-MCNC: 0.9 MG/DL (ref 0.5–1.4)
DIFFERENTIAL METHOD: ABNORMAL
EOSINOPHIL # BLD AUTO: 0 K/UL (ref 0–0.5)
EOSINOPHIL NFR BLD: 0.1 % (ref 0–8)
ERYTHROCYTE [DISTWIDTH] IN BLOOD BY AUTOMATED COUNT: 19.6 % (ref 11.5–14.5)
EST. GFR  (AFRICAN AMERICAN): >60 ML/MIN/1.73 M^2
EST. GFR  (NON AFRICAN AMERICAN): >60 ML/MIN/1.73 M^2
GLUCOSE SERPL-MCNC: 97 MG/DL (ref 70–110)
HCT VFR BLD AUTO: 44.2 % (ref 40–54)
HGB BLD-MCNC: 13.8 G/DL (ref 14–18)
IMM GRANULOCYTES # BLD AUTO: 0.06 K/UL (ref 0–0.04)
IMM GRANULOCYTES NFR BLD AUTO: 0.8 % (ref 0–0.5)
LYMPHOCYTES # BLD AUTO: 0.6 K/UL (ref 1–4.8)
LYMPHOCYTES NFR BLD: 8 % (ref 18–48)
MCH RBC QN AUTO: 26.4 PG (ref 27–31)
MCHC RBC AUTO-ENTMCNC: 31.2 G/DL (ref 32–36)
MCV RBC AUTO: 85 FL (ref 82–98)
MONOCYTES # BLD AUTO: 0.1 K/UL (ref 0.3–1)
MONOCYTES NFR BLD: 1.8 % (ref 4–15)
NEUTROPHILS # BLD AUTO: 6.9 K/UL (ref 1.8–7.7)
NEUTROPHILS NFR BLD: 89 % (ref 38–73)
NRBC BLD-RTO: 0 /100 WBC
PLATELET # BLD AUTO: 271 K/UL (ref 150–450)
PMV BLD AUTO: 9.2 FL (ref 9.2–12.9)
POTASSIUM SERPL-SCNC: 4.9 MMOL/L (ref 3.5–5.1)
PROT SERPL-MCNC: 6.6 G/DL (ref 6–8.4)
RBC # BLD AUTO: 5.23 M/UL (ref 4.6–6.2)
SODIUM SERPL-SCNC: 139 MMOL/L (ref 136–145)
WBC # BLD AUTO: 7.77 K/UL (ref 3.9–12.7)

## 2021-06-08 PROCEDURE — S4991 NICOTINE PATCH NONLEGEND: HCPCS | Performed by: PHYSICIAN ASSISTANT

## 2021-06-08 PROCEDURE — 25000003 PHARM REV CODE 250: Performed by: NURSE PRACTITIONER

## 2021-06-08 PROCEDURE — 99220 PR INITIAL OBSERVATION CARE,LEVL III: ICD-10-PCS | Mod: ,,, | Performed by: PHYSICIAN ASSISTANT

## 2021-06-08 PROCEDURE — 99220 PR INITIAL OBSERVATION CARE,LEVL III: CPT | Mod: ,,, | Performed by: PHYSICIAN ASSISTANT

## 2021-06-08 PROCEDURE — 25000242 PHARM REV CODE 250 ALT 637 W/ HCPCS: Performed by: PHYSICIAN ASSISTANT

## 2021-06-08 PROCEDURE — 63600175 PHARM REV CODE 636 W HCPCS: Performed by: PHYSICIAN ASSISTANT

## 2021-06-08 PROCEDURE — 94761 N-INVAS EAR/PLS OXIMETRY MLT: CPT

## 2021-06-08 PROCEDURE — 94640 AIRWAY INHALATION TREATMENT: CPT

## 2021-06-08 PROCEDURE — 63600175 PHARM REV CODE 636 W HCPCS: Performed by: NURSE PRACTITIONER

## 2021-06-08 PROCEDURE — 25000242 PHARM REV CODE 250 ALT 637 W/ HCPCS: Performed by: NURSE PRACTITIONER

## 2021-06-08 PROCEDURE — 11000001 HC ACUTE MED/SURG PRIVATE ROOM

## 2021-06-08 PROCEDURE — 99900035 HC TECH TIME PER 15 MIN (STAT)

## 2021-06-08 PROCEDURE — 85025 COMPLETE CBC W/AUTO DIFF WBC: CPT | Performed by: NURSE PRACTITIONER

## 2021-06-08 PROCEDURE — 80053 COMPREHEN METABOLIC PANEL: CPT | Performed by: NURSE PRACTITIONER

## 2021-06-08 PROCEDURE — 27000221 HC OXYGEN, UP TO 24 HOURS

## 2021-06-08 PROCEDURE — S4991 NICOTINE PATCH NONLEGEND: HCPCS | Performed by: NURSE PRACTITIONER

## 2021-06-08 PROCEDURE — 25000003 PHARM REV CODE 250: Performed by: PHYSICIAN ASSISTANT

## 2021-06-08 RX ORDER — IPRATROPIUM BROMIDE AND ALBUTEROL SULFATE 2.5; .5 MG/3ML; MG/3ML
3 SOLUTION RESPIRATORY (INHALATION)
Status: DISCONTINUED | OUTPATIENT
Start: 2021-06-08 | End: 2021-06-10 | Stop reason: HOSPADM

## 2021-06-08 RX ORDER — IPRATROPIUM BROMIDE AND ALBUTEROL SULFATE 2.5; .5 MG/3ML; MG/3ML
3 SOLUTION RESPIRATORY (INHALATION) EVERY 4 HOURS PRN
Status: DISCONTINUED | OUTPATIENT
Start: 2021-06-08 | End: 2021-06-10 | Stop reason: HOSPADM

## 2021-06-08 RX ORDER — FOLIC ACID 1 MG/1
1 TABLET ORAL DAILY
Status: DISCONTINUED | OUTPATIENT
Start: 2021-06-08 | End: 2021-06-10 | Stop reason: HOSPADM

## 2021-06-08 RX ORDER — IBUPROFEN 200 MG
1 TABLET ORAL DAILY
Status: DISCONTINUED | OUTPATIENT
Start: 2021-06-08 | End: 2021-06-10 | Stop reason: HOSPADM

## 2021-06-08 RX ORDER — LANOLIN ALCOHOL/MO/W.PET/CERES
100 CREAM (GRAM) TOPICAL DAILY
Status: DISCONTINUED | OUTPATIENT
Start: 2021-06-08 | End: 2021-06-10 | Stop reason: HOSPADM

## 2021-06-08 RX ORDER — ATORVASTATIN CALCIUM 20 MG/1
40 TABLET, FILM COATED ORAL DAILY
Status: DISCONTINUED | OUTPATIENT
Start: 2021-06-08 | End: 2021-06-10 | Stop reason: HOSPADM

## 2021-06-08 RX ORDER — AMLODIPINE BESYLATE 5 MG/1
5 TABLET ORAL DAILY
Status: DISCONTINUED | OUTPATIENT
Start: 2021-06-08 | End: 2021-06-09

## 2021-06-08 RX ORDER — FLUTICASONE FUROATE AND VILANTEROL 100; 25 UG/1; UG/1
1 POWDER RESPIRATORY (INHALATION) DAILY
Refills: 1 | Status: DISCONTINUED | OUTPATIENT
Start: 2021-06-08 | End: 2021-06-10 | Stop reason: HOSPADM

## 2021-06-08 RX ORDER — ENOXAPARIN SODIUM 100 MG/ML
40 INJECTION SUBCUTANEOUS EVERY 24 HOURS
Status: DISCONTINUED | OUTPATIENT
Start: 2021-06-08 | End: 2021-06-10 | Stop reason: HOSPADM

## 2021-06-08 RX ORDER — AMOXICILLIN 250 MG
1 CAPSULE ORAL 2 TIMES DAILY PRN
Status: DISCONTINUED | OUTPATIENT
Start: 2021-06-08 | End: 2021-06-10 | Stop reason: HOSPADM

## 2021-06-08 RX ORDER — ASPIRIN 81 MG/1
81 TABLET ORAL DAILY
Status: DISCONTINUED | OUTPATIENT
Start: 2021-06-08 | End: 2021-06-10 | Stop reason: HOSPADM

## 2021-06-08 RX ORDER — ACETAMINOPHEN 325 MG/1
650 TABLET ORAL EVERY 8 HOURS PRN
Status: DISCONTINUED | OUTPATIENT
Start: 2021-06-08 | End: 2021-06-10 | Stop reason: HOSPADM

## 2021-06-08 RX ORDER — PREDNISONE 20 MG/1
40 TABLET ORAL DAILY
Status: DISCONTINUED | OUTPATIENT
Start: 2021-06-08 | End: 2021-06-10 | Stop reason: HOSPADM

## 2021-06-08 RX ORDER — ONDANSETRON 2 MG/ML
4 INJECTION INTRAMUSCULAR; INTRAVENOUS EVERY 8 HOURS PRN
Status: DISCONTINUED | OUTPATIENT
Start: 2021-06-08 | End: 2021-06-10 | Stop reason: HOSPADM

## 2021-06-08 RX ADMIN — PREDNISONE 40 MG: 20 TABLET ORAL at 12:06

## 2021-06-08 RX ADMIN — FOLIC ACID 1 MG: 1 TABLET ORAL at 08:06

## 2021-06-08 RX ADMIN — FLUTICASONE FUROATE AND VILANTEROL TRIFENATATE 1 PUFF: 100; 25 POWDER RESPIRATORY (INHALATION) at 07:06

## 2021-06-08 RX ADMIN — IPRATROPIUM BROMIDE AND ALBUTEROL SULFATE 3 ML: .5; 3 SOLUTION RESPIRATORY (INHALATION) at 08:06

## 2021-06-08 RX ADMIN — NICOTINE 1 PATCH: 14 PATCH TRANSDERMAL at 08:06

## 2021-06-08 RX ADMIN — ATORVASTATIN CALCIUM 40 MG: 20 TABLET, FILM COATED ORAL at 08:06

## 2021-06-08 RX ADMIN — PREDNISONE 40 MG: 20 TABLET ORAL at 08:06

## 2021-06-08 RX ADMIN — IPRATROPIUM BROMIDE AND ALBUTEROL SULFATE 3 ML: .5; 3 SOLUTION RESPIRATORY (INHALATION) at 07:06

## 2021-06-08 RX ADMIN — IPRATROPIUM BROMIDE AND ALBUTEROL SULFATE 3 ML: .5; 3 SOLUTION RESPIRATORY (INHALATION) at 11:06

## 2021-06-08 RX ADMIN — IPRATROPIUM BROMIDE AND ALBUTEROL SULFATE 3 ML: .5; 3 SOLUTION RESPIRATORY (INHALATION) at 03:06

## 2021-06-08 RX ADMIN — ASPIRIN 81 MG: 81 TABLET, COATED ORAL at 08:06

## 2021-06-08 RX ADMIN — Medication 100 MG: at 08:06

## 2021-06-08 RX ADMIN — AMLODIPINE BESYLATE 5 MG: 5 TABLET ORAL at 08:06

## 2021-06-08 RX ADMIN — ENOXAPARIN SODIUM 40 MG: 40 INJECTION SUBCUTANEOUS at 04:06

## 2021-06-09 LAB
ALBUMIN SERPL BCP-MCNC: 2.8 G/DL (ref 3.5–5.2)
ALP SERPL-CCNC: 70 U/L (ref 55–135)
ALT SERPL W/O P-5'-P-CCNC: 14 U/L (ref 10–44)
ANION GAP SERPL CALC-SCNC: 7 MMOL/L (ref 8–16)
AST SERPL-CCNC: 13 U/L (ref 10–40)
BASOPHILS # BLD AUTO: 0.02 K/UL (ref 0–0.2)
BASOPHILS NFR BLD: 0.3 % (ref 0–1.9)
BILIRUB SERPL-MCNC: 0.5 MG/DL (ref 0.1–1)
BUN SERPL-MCNC: 17 MG/DL (ref 6–20)
CALCIUM SERPL-MCNC: 8.5 MG/DL (ref 8.7–10.5)
CHLORIDE SERPL-SCNC: 104 MMOL/L (ref 95–110)
CO2 SERPL-SCNC: 30 MMOL/L (ref 23–29)
CREAT SERPL-MCNC: 0.8 MG/DL (ref 0.5–1.4)
DIFFERENTIAL METHOD: ABNORMAL
EOSINOPHIL # BLD AUTO: 0.1 K/UL (ref 0–0.5)
EOSINOPHIL NFR BLD: 0.9 % (ref 0–8)
ERYTHROCYTE [DISTWIDTH] IN BLOOD BY AUTOMATED COUNT: 19.1 % (ref 11.5–14.5)
EST. GFR  (AFRICAN AMERICAN): >60 ML/MIN/1.73 M^2
EST. GFR  (NON AFRICAN AMERICAN): >60 ML/MIN/1.73 M^2
GLUCOSE SERPL-MCNC: 93 MG/DL (ref 70–110)
HCT VFR BLD AUTO: 41.9 % (ref 40–54)
HGB BLD-MCNC: 13.1 G/DL (ref 14–18)
IMM GRANULOCYTES # BLD AUTO: 0.04 K/UL (ref 0–0.04)
IMM GRANULOCYTES NFR BLD AUTO: 0.6 % (ref 0–0.5)
LYMPHOCYTES # BLD AUTO: 1.8 K/UL (ref 1–4.8)
LYMPHOCYTES NFR BLD: 26.1 % (ref 18–48)
MCH RBC QN AUTO: 26.4 PG (ref 27–31)
MCHC RBC AUTO-ENTMCNC: 31.3 G/DL (ref 32–36)
MCV RBC AUTO: 85 FL (ref 82–98)
MONOCYTES # BLD AUTO: 1.1 K/UL (ref 0.3–1)
MONOCYTES NFR BLD: 15.7 % (ref 4–15)
NEUTROPHILS # BLD AUTO: 4 K/UL (ref 1.8–7.7)
NEUTROPHILS NFR BLD: 56.4 % (ref 38–73)
NRBC BLD-RTO: 0 /100 WBC
PLATELET # BLD AUTO: 258 K/UL (ref 150–450)
PMV BLD AUTO: 9.5 FL (ref 9.2–12.9)
POTASSIUM SERPL-SCNC: 4.1 MMOL/L (ref 3.5–5.1)
PROT SERPL-MCNC: 6 G/DL (ref 6–8.4)
RBC # BLD AUTO: 4.96 M/UL (ref 4.6–6.2)
SODIUM SERPL-SCNC: 141 MMOL/L (ref 136–145)
WBC # BLD AUTO: 7 K/UL (ref 3.9–12.7)

## 2021-06-09 PROCEDURE — 94761 N-INVAS EAR/PLS OXIMETRY MLT: CPT

## 2021-06-09 PROCEDURE — 97165 OT EVAL LOW COMPLEX 30 MIN: CPT

## 2021-06-09 PROCEDURE — 63600175 PHARM REV CODE 636 W HCPCS: Performed by: PHYSICIAN ASSISTANT

## 2021-06-09 PROCEDURE — 25000242 PHARM REV CODE 250 ALT 637 W/ HCPCS: Performed by: PHYSICIAN ASSISTANT

## 2021-06-09 PROCEDURE — 99900035 HC TECH TIME PER 15 MIN (STAT)

## 2021-06-09 PROCEDURE — 99233 SBSQ HOSP IP/OBS HIGH 50: CPT | Mod: ,,, | Performed by: PHYSICIAN ASSISTANT

## 2021-06-09 PROCEDURE — S4991 NICOTINE PATCH NONLEGEND: HCPCS | Performed by: PHYSICIAN ASSISTANT

## 2021-06-09 PROCEDURE — 25000003 PHARM REV CODE 250: Performed by: PHYSICIAN ASSISTANT

## 2021-06-09 PROCEDURE — 97162 PT EVAL MOD COMPLEX 30 MIN: CPT

## 2021-06-09 PROCEDURE — 36415 COLL VENOUS BLD VENIPUNCTURE: CPT | Performed by: PHYSICIAN ASSISTANT

## 2021-06-09 PROCEDURE — 25000003 PHARM REV CODE 250: Performed by: NURSE PRACTITIONER

## 2021-06-09 PROCEDURE — 27000221 HC OXYGEN, UP TO 24 HOURS

## 2021-06-09 PROCEDURE — 94640 AIRWAY INHALATION TREATMENT: CPT

## 2021-06-09 PROCEDURE — 99233 PR SUBSEQUENT HOSPITAL CARE,LEVL III: ICD-10-PCS | Mod: ,,, | Performed by: PHYSICIAN ASSISTANT

## 2021-06-09 PROCEDURE — 11000001 HC ACUTE MED/SURG PRIVATE ROOM

## 2021-06-09 PROCEDURE — 80053 COMPREHEN METABOLIC PANEL: CPT | Performed by: PHYSICIAN ASSISTANT

## 2021-06-09 PROCEDURE — 85025 COMPLETE CBC W/AUTO DIFF WBC: CPT | Performed by: PHYSICIAN ASSISTANT

## 2021-06-09 RX ORDER — HYDRALAZINE HYDROCHLORIDE 25 MG/1
25 TABLET, FILM COATED ORAL EVERY 6 HOURS PRN
Status: DISCONTINUED | OUTPATIENT
Start: 2021-06-09 | End: 2021-06-10 | Stop reason: HOSPADM

## 2021-06-09 RX ORDER — AMLODIPINE BESYLATE 5 MG/1
10 TABLET ORAL DAILY
Status: DISCONTINUED | OUTPATIENT
Start: 2021-06-10 | End: 2021-06-10 | Stop reason: HOSPADM

## 2021-06-09 RX ADMIN — IPRATROPIUM BROMIDE AND ALBUTEROL SULFATE 3 ML: .5; 3 SOLUTION RESPIRATORY (INHALATION) at 07:06

## 2021-06-09 RX ADMIN — FOLIC ACID 1 MG: 1 TABLET ORAL at 09:06

## 2021-06-09 RX ADMIN — IPRATROPIUM BROMIDE AND ALBUTEROL SULFATE 3 ML: .5; 3 SOLUTION RESPIRATORY (INHALATION) at 03:06

## 2021-06-09 RX ADMIN — PREDNISONE 40 MG: 20 TABLET ORAL at 09:06

## 2021-06-09 RX ADMIN — ASPIRIN 81 MG: 81 TABLET, COATED ORAL at 09:06

## 2021-06-09 RX ADMIN — NICOTINE 1 PATCH: 14 PATCH TRANSDERMAL at 09:06

## 2021-06-09 RX ADMIN — AMLODIPINE BESYLATE 5 MG: 5 TABLET ORAL at 09:06

## 2021-06-09 RX ADMIN — ENOXAPARIN SODIUM 40 MG: 40 INJECTION SUBCUTANEOUS at 05:06

## 2021-06-09 RX ADMIN — ATORVASTATIN CALCIUM 40 MG: 20 TABLET, FILM COATED ORAL at 09:06

## 2021-06-09 RX ADMIN — HYDRALAZINE HYDROCHLORIDE 25 MG: 25 TABLET, FILM COATED ORAL at 01:06

## 2021-06-09 RX ADMIN — IPRATROPIUM BROMIDE AND ALBUTEROL SULFATE 3 ML: .5; 3 SOLUTION RESPIRATORY (INHALATION) at 12:06

## 2021-06-09 RX ADMIN — HYDRALAZINE HYDROCHLORIDE 25 MG: 25 TABLET, FILM COATED ORAL at 06:06

## 2021-06-09 RX ADMIN — Medication 100 MG: at 09:06

## 2021-06-09 RX ADMIN — FLUTICASONE FUROATE AND VILANTEROL TRIFENATATE 1 PUFF: 100; 25 POWDER RESPIRATORY (INHALATION) at 07:06

## 2021-06-10 VITALS
HEART RATE: 89 BPM | OXYGEN SATURATION: 95 % | BODY MASS INDEX: 21.65 KG/M2 | WEIGHT: 142.88 LBS | RESPIRATION RATE: 18 BRPM | DIASTOLIC BLOOD PRESSURE: 87 MMHG | HEIGHT: 68 IN | SYSTOLIC BLOOD PRESSURE: 165 MMHG | TEMPERATURE: 98 F

## 2021-06-10 LAB
ALBUMIN SERPL BCP-MCNC: 2.8 G/DL (ref 3.5–5.2)
ALP SERPL-CCNC: 65 U/L (ref 55–135)
ALT SERPL W/O P-5'-P-CCNC: 15 U/L (ref 10–44)
ANION GAP SERPL CALC-SCNC: 7 MMOL/L (ref 8–16)
AST SERPL-CCNC: 13 U/L (ref 10–40)
BASOPHILS # BLD AUTO: 0.02 K/UL (ref 0–0.2)
BASOPHILS NFR BLD: 0.2 % (ref 0–1.9)
BILIRUB SERPL-MCNC: 0.4 MG/DL (ref 0.1–1)
BUN SERPL-MCNC: 11 MG/DL (ref 6–20)
CALCIUM SERPL-MCNC: 8.3 MG/DL (ref 8.7–10.5)
CHLORIDE SERPL-SCNC: 103 MMOL/L (ref 95–110)
CO2 SERPL-SCNC: 29 MMOL/L (ref 23–29)
CREAT SERPL-MCNC: 0.9 MG/DL (ref 0.5–1.4)
DIFFERENTIAL METHOD: ABNORMAL
EOSINOPHIL # BLD AUTO: 0.1 K/UL (ref 0–0.5)
EOSINOPHIL NFR BLD: 0.8 % (ref 0–8)
ERYTHROCYTE [DISTWIDTH] IN BLOOD BY AUTOMATED COUNT: 19.5 % (ref 11.5–14.5)
EST. GFR  (AFRICAN AMERICAN): >60 ML/MIN/1.73 M^2
EST. GFR  (NON AFRICAN AMERICAN): >60 ML/MIN/1.73 M^2
GLUCOSE SERPL-MCNC: 82 MG/DL (ref 70–110)
HCT VFR BLD AUTO: 42.2 % (ref 40–54)
HGB BLD-MCNC: 13.3 G/DL (ref 14–18)
IMM GRANULOCYTES # BLD AUTO: 0.06 K/UL (ref 0–0.04)
IMM GRANULOCYTES NFR BLD AUTO: 0.7 % (ref 0–0.5)
LYMPHOCYTES # BLD AUTO: 2.1 K/UL (ref 1–4.8)
LYMPHOCYTES NFR BLD: 23.4 % (ref 18–48)
MCH RBC QN AUTO: 26.9 PG (ref 27–31)
MCHC RBC AUTO-ENTMCNC: 31.5 G/DL (ref 32–36)
MCV RBC AUTO: 85 FL (ref 82–98)
MONOCYTES # BLD AUTO: 1.1 K/UL (ref 0.3–1)
MONOCYTES NFR BLD: 11.7 % (ref 4–15)
NEUTROPHILS # BLD AUTO: 5.7 K/UL (ref 1.8–7.7)
NEUTROPHILS NFR BLD: 63.2 % (ref 38–73)
NRBC BLD-RTO: 0 /100 WBC
PLATELET # BLD AUTO: 288 K/UL (ref 150–450)
PMV BLD AUTO: 9.8 FL (ref 9.2–12.9)
POTASSIUM SERPL-SCNC: 3.7 MMOL/L (ref 3.5–5.1)
PROT SERPL-MCNC: 5.9 G/DL (ref 6–8.4)
RBC # BLD AUTO: 4.94 M/UL (ref 4.6–6.2)
SARS-COV-2 RDRP RESP QL NAA+PROBE: NEGATIVE
SODIUM SERPL-SCNC: 139 MMOL/L (ref 136–145)
WBC # BLD AUTO: 9.05 K/UL (ref 3.9–12.7)

## 2021-06-10 PROCEDURE — 27000221 HC OXYGEN, UP TO 24 HOURS

## 2021-06-10 PROCEDURE — 86580 TB INTRADERMAL TEST: CPT | Performed by: PHYSICIAN ASSISTANT

## 2021-06-10 PROCEDURE — 63600175 PHARM REV CODE 636 W HCPCS: Performed by: PHYSICIAN ASSISTANT

## 2021-06-10 PROCEDURE — 99239 HOSP IP/OBS DSCHRG MGMT >30: CPT | Mod: ,,, | Performed by: PHYSICIAN ASSISTANT

## 2021-06-10 PROCEDURE — 36415 COLL VENOUS BLD VENIPUNCTURE: CPT | Performed by: PHYSICIAN ASSISTANT

## 2021-06-10 PROCEDURE — 99239 PR HOSPITAL DISCHARGE DAY,>30 MIN: ICD-10-PCS | Mod: ,,, | Performed by: PHYSICIAN ASSISTANT

## 2021-06-10 PROCEDURE — 30200315 PPD INTRADERMAL TEST REV CODE 302: Performed by: PHYSICIAN ASSISTANT

## 2021-06-10 PROCEDURE — 25000003 PHARM REV CODE 250: Performed by: PHYSICIAN ASSISTANT

## 2021-06-10 PROCEDURE — 85025 COMPLETE CBC W/AUTO DIFF WBC: CPT | Performed by: PHYSICIAN ASSISTANT

## 2021-06-10 PROCEDURE — 94640 AIRWAY INHALATION TREATMENT: CPT

## 2021-06-10 PROCEDURE — U0002 COVID-19 LAB TEST NON-CDC: HCPCS | Performed by: PHYSICIAN ASSISTANT

## 2021-06-10 PROCEDURE — 80053 COMPREHEN METABOLIC PANEL: CPT | Performed by: PHYSICIAN ASSISTANT

## 2021-06-10 PROCEDURE — 25000242 PHARM REV CODE 250 ALT 637 W/ HCPCS: Performed by: PHYSICIAN ASSISTANT

## 2021-06-10 PROCEDURE — 94761 N-INVAS EAR/PLS OXIMETRY MLT: CPT

## 2021-06-10 PROCEDURE — S4991 NICOTINE PATCH NONLEGEND: HCPCS | Performed by: PHYSICIAN ASSISTANT

## 2021-06-10 RX ORDER — ACETAMINOPHEN 325 MG/1
650 TABLET ORAL EVERY 8 HOURS PRN
Refills: 0
Start: 2021-06-10 | End: 2022-07-07 | Stop reason: SDUPTHER

## 2021-06-10 RX ORDER — PREDNISONE 20 MG/1
40 TABLET ORAL DAILY
Qty: 2 TABLET | Refills: 0
Start: 2021-06-11 | End: 2021-06-12

## 2021-06-10 RX ORDER — AMLODIPINE BESYLATE 10 MG/1
10 TABLET ORAL DAILY
Status: ON HOLD
Start: 2021-06-11 | End: 2022-02-11 | Stop reason: HOSPADM

## 2021-06-10 RX ADMIN — AMLODIPINE BESYLATE 10 MG: 5 TABLET ORAL at 09:06

## 2021-06-10 RX ADMIN — ATORVASTATIN CALCIUM 40 MG: 20 TABLET, FILM COATED ORAL at 09:06

## 2021-06-10 RX ADMIN — TUBERCULIN PURIFIED PROTEIN DERIVATIVE 5 UNITS: 5 INJECTION, SOLUTION INTRADERMAL at 11:06

## 2021-06-10 RX ADMIN — IPRATROPIUM BROMIDE AND ALBUTEROL SULFATE 3 ML: .5; 3 SOLUTION RESPIRATORY (INHALATION) at 12:06

## 2021-06-10 RX ADMIN — Medication 100 MG: at 09:06

## 2021-06-10 RX ADMIN — NICOTINE 1 PATCH: 14 PATCH TRANSDERMAL at 09:06

## 2021-06-10 RX ADMIN — FOLIC ACID 1 MG: 1 TABLET ORAL at 09:06

## 2021-06-10 RX ADMIN — FLUTICASONE FUROATE AND VILANTEROL TRIFENATATE 1 PUFF: 100; 25 POWDER RESPIRATORY (INHALATION) at 08:06

## 2021-06-10 RX ADMIN — PREDNISONE 40 MG: 20 TABLET ORAL at 09:06

## 2021-06-10 RX ADMIN — IPRATROPIUM BROMIDE AND ALBUTEROL SULFATE 3 ML: .5; 3 SOLUTION RESPIRATORY (INHALATION) at 08:06

## 2021-06-10 RX ADMIN — IPRATROPIUM BROMIDE AND ALBUTEROL SULFATE 3 ML: .5; 3 SOLUTION RESPIRATORY (INHALATION) at 03:06

## 2021-06-10 RX ADMIN — ASPIRIN 81 MG: 81 TABLET, COATED ORAL at 09:06

## 2021-06-10 RX ADMIN — ENOXAPARIN SODIUM 40 MG: 40 INJECTION SUBCUTANEOUS at 05:06

## 2021-06-15 ENCOUNTER — TELEPHONE (OUTPATIENT)
Dept: SMOKING CESSATION | Facility: CLINIC | Age: 61
End: 2021-06-15

## 2021-06-16 ENCOUNTER — DOCUMENTATION ONLY (OUTPATIENT)
Dept: PULMONOLOGY | Facility: CLINIC | Age: 61
End: 2021-06-16

## 2021-06-17 ENCOUNTER — LAB VISIT (OUTPATIENT)
Dept: LAB | Facility: OTHER | Age: 61
End: 2021-06-17
Payer: MEDICAID

## 2021-06-17 DIAGNOSIS — Z20.822 ENCOUNTER FOR LABORATORY TESTING FOR COVID-19 VIRUS: ICD-10-CM

## 2021-06-17 PROCEDURE — U0003 INFECTIOUS AGENT DETECTION BY NUCLEIC ACID (DNA OR RNA); SEVERE ACUTE RESPIRATORY SYNDROME CORONAVIRUS 2 (SARS-COV-2) (CORONAVIRUS DISEASE [COVID-19]), AMPLIFIED PROBE TECHNIQUE, MAKING USE OF HIGH THROUGHPUT TECHNOLOGIES AS DESCRIBED BY CMS-2020-01-R: HCPCS | Performed by: NURSE PRACTITIONER

## 2021-06-18 LAB — SARS-COV-2 RNA RESP QL NAA+PROBE: NOT DETECTED

## 2021-06-24 ENCOUNTER — LAB VISIT (OUTPATIENT)
Dept: LAB | Facility: OTHER | Age: 61
End: 2021-06-24
Payer: MEDICAID

## 2021-06-24 DIAGNOSIS — Z20.822 ENCOUNTER FOR LABORATORY TESTING FOR COVID-19 VIRUS: ICD-10-CM

## 2021-06-24 PROCEDURE — U0003 INFECTIOUS AGENT DETECTION BY NUCLEIC ACID (DNA OR RNA); SEVERE ACUTE RESPIRATORY SYNDROME CORONAVIRUS 2 (SARS-COV-2) (CORONAVIRUS DISEASE [COVID-19]), AMPLIFIED PROBE TECHNIQUE, MAKING USE OF HIGH THROUGHPUT TECHNOLOGIES AS DESCRIBED BY CMS-2020-01-R: HCPCS | Performed by: NURSE PRACTITIONER

## 2021-06-25 LAB — SARS-COV-2 RNA RESP QL NAA+PROBE: NOT DETECTED

## 2021-07-15 ENCOUNTER — OFFICE VISIT (OUTPATIENT)
Dept: PULMONOLOGY | Facility: CLINIC | Age: 61
End: 2021-07-15
Payer: MEDICAID

## 2021-07-15 VITALS — WEIGHT: 140.88 LBS | HEIGHT: 68 IN | BODY MASS INDEX: 21.35 KG/M2 | HEART RATE: 83 BPM | OXYGEN SATURATION: 93 %

## 2021-07-15 DIAGNOSIS — Z72.0 TOBACCO ABUSE: Chronic | ICD-10-CM

## 2021-07-15 DIAGNOSIS — J96.10 CHRONIC RESPIRATORY FAILURE, UNSPECIFIED WHETHER WITH HYPOXIA OR HYPERCAPNIA: ICD-10-CM

## 2021-07-15 DIAGNOSIS — J44.9 CHRONIC OBSTRUCTIVE PULMONARY DISEASE, UNSPECIFIED COPD TYPE: Primary | ICD-10-CM

## 2021-07-15 PROCEDURE — 99999 PR PBB SHADOW E&M-EST. PATIENT-LVL IV: ICD-10-PCS | Mod: PBBFAC,,, | Performed by: NURSE PRACTITIONER

## 2021-07-15 PROCEDURE — 99214 OFFICE O/P EST MOD 30 MIN: CPT | Mod: S$PBB,,, | Performed by: NURSE PRACTITIONER

## 2021-07-15 PROCEDURE — 99999 PR PBB SHADOW E&M-EST. PATIENT-LVL IV: CPT | Mod: PBBFAC,,, | Performed by: NURSE PRACTITIONER

## 2021-07-15 PROCEDURE — 99214 PR OFFICE/OUTPT VISIT, EST, LEVL IV, 30-39 MIN: ICD-10-PCS | Mod: S$PBB,,, | Performed by: NURSE PRACTITIONER

## 2021-07-15 PROCEDURE — 99214 OFFICE O/P EST MOD 30 MIN: CPT | Mod: PBBFAC | Performed by: NURSE PRACTITIONER

## 2021-07-15 RX ORDER — BUDESONIDE, GLYCOPYRROLATE, AND FORMOTEROL FUMARATE 160; 9; 4.8 UG/1; UG/1; UG/1
2 AEROSOL, METERED RESPIRATORY (INHALATION) 2 TIMES DAILY
Qty: 10.7 G | Refills: 11 | Status: ON HOLD | OUTPATIENT
Start: 2021-07-15 | End: 2022-01-16 | Stop reason: SDUPTHER

## 2021-07-15 RX ORDER — ALBUTEROL SULFATE 90 UG/1
1-2 AEROSOL, METERED RESPIRATORY (INHALATION) EVERY 4 HOURS PRN
Qty: 8.5 G | Refills: 1 | Status: SHIPPED | OUTPATIENT
Start: 2021-07-15 | End: 2021-12-13 | Stop reason: SDUPTHER

## 2021-07-15 RX ORDER — BUDESONIDE, GLYCOPYRROLATE, AND FORMOTEROL FUMARATE 160; 9; 4.8 UG/1; UG/1; UG/1
2 AEROSOL, METERED RESPIRATORY (INHALATION) 2 TIMES DAILY
Qty: 10.7 G | Refills: 11 | Status: SHIPPED | OUTPATIENT
Start: 2021-07-15 | End: 2021-07-15

## 2021-07-16 ENCOUNTER — TELEPHONE (OUTPATIENT)
Dept: PRIMARY CARE CLINIC | Facility: CLINIC | Age: 61
End: 2021-07-16

## 2021-07-20 ENCOUNTER — TELEPHONE (OUTPATIENT)
Dept: PULMONOLOGY | Facility: CLINIC | Age: 61
End: 2021-07-20

## 2021-07-21 ENCOUNTER — TELEPHONE (OUTPATIENT)
Dept: PULMONOLOGY | Facility: CLINIC | Age: 61
End: 2021-07-21

## 2021-07-21 ENCOUNTER — TELEPHONE (OUTPATIENT)
Dept: PHARMACY | Facility: CLINIC | Age: 61
End: 2021-07-21

## 2021-07-22 ENCOUNTER — LAB VISIT (OUTPATIENT)
Dept: LAB | Facility: OTHER | Age: 61
End: 2021-07-22
Payer: MEDICAID

## 2021-07-22 DIAGNOSIS — Z20.822 ENCOUNTER FOR LABORATORY TESTING FOR COVID-19 VIRUS: ICD-10-CM

## 2021-07-22 PROCEDURE — U0003 INFECTIOUS AGENT DETECTION BY NUCLEIC ACID (DNA OR RNA); SEVERE ACUTE RESPIRATORY SYNDROME CORONAVIRUS 2 (SARS-COV-2) (CORONAVIRUS DISEASE [COVID-19]), AMPLIFIED PROBE TECHNIQUE, MAKING USE OF HIGH THROUGHPUT TECHNOLOGIES AS DESCRIBED BY CMS-2020-01-R: HCPCS | Performed by: NURSE PRACTITIONER

## 2021-07-23 LAB
SARS-COV-2 RNA RESP QL NAA+PROBE: NOT DETECTED
SARS-COV-2- CYCLE NUMBER: -1

## 2021-07-29 ENCOUNTER — LAB VISIT (OUTPATIENT)
Dept: LAB | Facility: OTHER | Age: 61
End: 2021-07-29
Payer: MEDICAID

## 2021-07-29 DIAGNOSIS — Z20.822 ENCOUNTER FOR LABORATORY TESTING FOR COVID-19 VIRUS: ICD-10-CM

## 2021-07-29 PROCEDURE — U0003 INFECTIOUS AGENT DETECTION BY NUCLEIC ACID (DNA OR RNA); SEVERE ACUTE RESPIRATORY SYNDROME CORONAVIRUS 2 (SARS-COV-2) (CORONAVIRUS DISEASE [COVID-19]), AMPLIFIED PROBE TECHNIQUE, MAKING USE OF HIGH THROUGHPUT TECHNOLOGIES AS DESCRIBED BY CMS-2020-01-R: HCPCS | Performed by: NURSE PRACTITIONER

## 2021-07-30 LAB
SARS-COV-2 RNA RESP QL NAA+PROBE: NOT DETECTED
SARS-COV-2- CYCLE NUMBER: -1

## 2021-08-05 ENCOUNTER — LAB VISIT (OUTPATIENT)
Dept: LAB | Facility: OTHER | Age: 61
End: 2021-08-05
Payer: MEDICAID

## 2021-08-05 DIAGNOSIS — Z20.822 ENCOUNTER FOR LABORATORY TESTING FOR COVID-19 VIRUS: ICD-10-CM

## 2021-08-05 PROCEDURE — U0003 INFECTIOUS AGENT DETECTION BY NUCLEIC ACID (DNA OR RNA); SEVERE ACUTE RESPIRATORY SYNDROME CORONAVIRUS 2 (SARS-COV-2) (CORONAVIRUS DISEASE [COVID-19]), AMPLIFIED PROBE TECHNIQUE, MAKING USE OF HIGH THROUGHPUT TECHNOLOGIES AS DESCRIBED BY CMS-2020-01-R: HCPCS | Performed by: NURSE PRACTITIONER

## 2021-08-06 DIAGNOSIS — Z20.822 ENCOUNTER FOR LABORATORY TESTING FOR COVID-19 VIRUS: ICD-10-CM

## 2021-08-09 DIAGNOSIS — U07.1 COVID-19 VIRUS DETECTED: ICD-10-CM

## 2021-08-09 LAB
SARS-COV-2 RNA RESP QL NAA+PROBE: DETECTED
SARS-COV-2- CYCLE NUMBER: 41.28

## 2021-08-11 ENCOUNTER — HOSPITAL ENCOUNTER (EMERGENCY)
Facility: HOSPITAL | Age: 61
Discharge: NURSING, REHAB OR LONG-TERM CARE | End: 2021-08-11
Attending: EMERGENCY MEDICINE
Payer: MEDICAID

## 2021-08-11 VITALS
DIASTOLIC BLOOD PRESSURE: 95 MMHG | HEIGHT: 68 IN | TEMPERATURE: 99 F | RESPIRATION RATE: 18 BRPM | HEART RATE: 75 BPM | WEIGHT: 140 LBS | SYSTOLIC BLOOD PRESSURE: 162 MMHG | BODY MASS INDEX: 21.22 KG/M2 | OXYGEN SATURATION: 100 %

## 2021-08-11 DIAGNOSIS — U07.1 COVID-19: Primary | ICD-10-CM

## 2021-08-11 PROCEDURE — 99283 EMERGENCY DEPT VISIT LOW MDM: CPT

## 2021-08-11 PROCEDURE — 99284 EMERGENCY DEPT VISIT MOD MDM: CPT | Mod: CR,,, | Performed by: EMERGENCY MEDICINE

## 2021-08-11 PROCEDURE — 99284 PR EMERGENCY DEPT VISIT,LEVEL IV: ICD-10-PCS | Mod: CR,,, | Performed by: EMERGENCY MEDICINE

## 2021-08-13 ENCOUNTER — TELEPHONE (OUTPATIENT)
Dept: ADMINISTRATIVE | Facility: CLINIC | Age: 61
End: 2021-08-13

## 2021-08-19 ENCOUNTER — LAB VISIT (OUTPATIENT)
Dept: LAB | Facility: OTHER | Age: 61
End: 2021-08-19
Payer: MEDICAID

## 2021-08-19 DIAGNOSIS — Z20.822 ENCOUNTER FOR LABORATORY TESTING FOR COVID-19 VIRUS: ICD-10-CM

## 2021-08-19 PROCEDURE — U0003 INFECTIOUS AGENT DETECTION BY NUCLEIC ACID (DNA OR RNA); SEVERE ACUTE RESPIRATORY SYNDROME CORONAVIRUS 2 (SARS-COV-2) (CORONAVIRUS DISEASE [COVID-19]), AMPLIFIED PROBE TECHNIQUE, MAKING USE OF HIGH THROUGHPUT TECHNOLOGIES AS DESCRIBED BY CMS-2020-01-R: HCPCS | Performed by: NURSE PRACTITIONER

## 2021-08-20 LAB
SARS-COV-2 RNA RESP QL NAA+PROBE: NOT DETECTED
SARS-COV-2- CYCLE NUMBER: -1

## 2021-11-09 ENCOUNTER — TELEPHONE (OUTPATIENT)
Dept: PULMONOLOGY | Facility: CLINIC | Age: 61
End: 2021-11-09
Payer: MEDICAID

## 2021-11-15 ENCOUNTER — HOSPITAL ENCOUNTER (EMERGENCY)
Facility: HOSPITAL | Age: 61
Discharge: HOME | End: 2021-11-15
Attending: EMERGENCY MEDICINE
Payer: MEDICAID

## 2021-11-15 ENCOUNTER — PATIENT OUTREACH (OUTPATIENT)
Dept: EMERGENCY MEDICINE | Facility: HOSPITAL | Age: 61
End: 2021-11-15

## 2021-11-15 VITALS
RESPIRATION RATE: 16 BRPM | DIASTOLIC BLOOD PRESSURE: 76 MMHG | SYSTOLIC BLOOD PRESSURE: 116 MMHG | HEART RATE: 102 BPM | TEMPERATURE: 98 F | OXYGEN SATURATION: 100 %

## 2021-11-15 DIAGNOSIS — R06.02 SOB (SHORTNESS OF BREATH): Primary | ICD-10-CM

## 2021-11-15 DIAGNOSIS — J44.1 COPD EXACERBATION: ICD-10-CM

## 2021-11-15 LAB
ALLENS TEST: ABNORMAL
BASOPHILS # BLD AUTO: 0.03 K/UL (ref 0–0.2)
BASOPHILS NFR BLD: 0.3 % (ref 0–1.9)
BUN SERPL-MCNC: 10 MG/DL (ref 6–30)
CHLORIDE SERPL-SCNC: 95 MMOL/L (ref 95–110)
CREAT SERPL-MCNC: 0.9 MG/DL (ref 0.5–1.4)
DIFFERENTIAL METHOD: ABNORMAL
EOSINOPHIL # BLD AUTO: 0 K/UL (ref 0–0.5)
EOSINOPHIL NFR BLD: 0.1 % (ref 0–8)
ERYTHROCYTE [DISTWIDTH] IN BLOOD BY AUTOMATED COUNT: 16.3 % (ref 11.5–14.5)
GLUCOSE SERPL-MCNC: 105 MG/DL (ref 70–110)
HCO3 UR-SCNC: 30.3 MMOL/L (ref 24–28)
HCT VFR BLD AUTO: 41.1 % (ref 40–54)
HCT VFR BLD CALC: 42 %PCV (ref 36–54)
HCT VFR BLD CALC: 42 %PCV (ref 36–54)
HGB BLD-MCNC: 12.8 G/DL (ref 14–18)
IMM GRANULOCYTES # BLD AUTO: 0.02 K/UL (ref 0–0.04)
IMM GRANULOCYTES NFR BLD AUTO: 0.2 % (ref 0–0.5)
LYMPHOCYTES # BLD AUTO: 0.8 K/UL (ref 1–4.8)
LYMPHOCYTES NFR BLD: 9.3 % (ref 18–48)
MCH RBC QN AUTO: 24.7 PG (ref 27–31)
MCHC RBC AUTO-ENTMCNC: 31.1 G/DL (ref 32–36)
MCV RBC AUTO: 79 FL (ref 82–98)
MONOCYTES # BLD AUTO: 0.1 K/UL (ref 0.3–1)
MONOCYTES NFR BLD: 1.4 % (ref 4–15)
NEUTROPHILS # BLD AUTO: 7.8 K/UL (ref 1.8–7.7)
NEUTROPHILS NFR BLD: 88.7 % (ref 38–73)
NRBC BLD-RTO: 0 /100 WBC
PCO2 BLDA: 58.1 MMHG (ref 35–45)
PH SMN: 7.33 [PH] (ref 7.35–7.45)
PLATELET # BLD AUTO: 274 K/UL (ref 150–450)
PMV BLD AUTO: 10.3 FL (ref 9.2–12.9)
PO2 BLDA: 51 MMHG (ref 40–60)
POC BE: 4 MMOL/L
POC IONIZED CALCIUM: 0.98 MMOL/L (ref 1.06–1.42)
POC IONIZED CALCIUM: 1.12 MMOL/L (ref 1.06–1.42)
POC SATURATED O2: 82 % (ref 95–100)
POC TCO2 (MEASURED): 27 MMOL/L (ref 23–29)
POC TCO2: 32 MMOL/L (ref 24–29)
POTASSIUM BLD-SCNC: 4.4 MMOL/L (ref 3.5–5.1)
POTASSIUM BLD-SCNC: 4.4 MMOL/L (ref 3.5–5.1)
RBC # BLD AUTO: 5.18 M/UL (ref 4.6–6.2)
SAMPLE: ABNORMAL
SAMPLE: ABNORMAL
SITE: ABNORMAL
SODIUM BLD-SCNC: 133 MMOL/L (ref 136–145)
SODIUM BLD-SCNC: 134 MMOL/L (ref 136–145)
WBC # BLD AUTO: 8.74 K/UL (ref 3.9–12.7)

## 2021-11-15 PROCEDURE — 85025 COMPLETE CBC W/AUTO DIFF WBC: CPT | Performed by: EMERGENCY MEDICINE

## 2021-11-15 PROCEDURE — 99900035 HC TECH TIME PER 15 MIN (STAT)

## 2021-11-15 PROCEDURE — 80047 BASIC METABLC PNL IONIZED CA: CPT

## 2021-11-15 PROCEDURE — 99284 EMERGENCY DEPT VISIT MOD MDM: CPT | Mod: ,,, | Performed by: EMERGENCY MEDICINE

## 2021-11-15 PROCEDURE — 82803 BLOOD GASES ANY COMBINATION: CPT

## 2021-11-15 PROCEDURE — 99284 PR EMERGENCY DEPT VISIT,LEVEL IV: ICD-10-PCS | Mod: ,,, | Performed by: EMERGENCY MEDICINE

## 2021-11-15 PROCEDURE — 99284 EMERGENCY DEPT VISIT MOD MDM: CPT | Mod: 25

## 2021-11-29 ENCOUNTER — TELEPHONE (OUTPATIENT)
Dept: PULMONOLOGY | Facility: CLINIC | Age: 61
End: 2021-11-29
Payer: MEDICAID

## 2021-11-29 ENCOUNTER — PATIENT OUTREACH (OUTPATIENT)
Dept: EMERGENCY MEDICINE | Facility: HOSPITAL | Age: 61
End: 2021-11-29
Payer: MEDICAID

## 2021-12-10 ENCOUNTER — PATIENT OUTREACH (OUTPATIENT)
Dept: EMERGENCY MEDICINE | Facility: HOSPITAL | Age: 61
End: 2021-12-10
Payer: MEDICAID

## 2021-12-13 DIAGNOSIS — J44.9 CHRONIC OBSTRUCTIVE PULMONARY DISEASE, UNSPECIFIED COPD TYPE: ICD-10-CM

## 2021-12-13 RX ORDER — ALBUTEROL SULFATE 90 UG/1
1-2 AEROSOL, METERED RESPIRATORY (INHALATION) EVERY 4 HOURS PRN
Qty: 8.5 G | Refills: 1 | Status: ON HOLD | OUTPATIENT
Start: 2021-12-13 | End: 2022-01-16 | Stop reason: HOSPADM

## 2021-12-21 ENCOUNTER — HOSPITAL ENCOUNTER (OUTPATIENT)
Facility: HOSPITAL | Age: 61
Discharge: HOME | End: 2021-12-23
Attending: EMERGENCY MEDICINE | Admitting: EMERGENCY MEDICINE
Payer: MEDICAID

## 2021-12-21 DIAGNOSIS — J44.1 ACUTE EXACERBATION OF CHRONIC OBSTRUCTIVE PULMONARY DISEASE (COPD): Primary | ICD-10-CM

## 2021-12-21 DIAGNOSIS — R06.02 SOB (SHORTNESS OF BREATH): ICD-10-CM

## 2021-12-21 DIAGNOSIS — R07.9 CHEST PAIN: ICD-10-CM

## 2021-12-21 LAB
ALBUMIN SERPL BCP-MCNC: 3.5 G/DL (ref 3.5–5.2)
ALLENS TEST: ABNORMAL
ALP SERPL-CCNC: 120 U/L (ref 55–135)
ALT SERPL W/O P-5'-P-CCNC: 115 U/L (ref 10–44)
ANION GAP SERPL CALC-SCNC: 11 MMOL/L (ref 8–16)
AST SERPL-CCNC: 154 U/L (ref 10–40)
BASOPHILS # BLD AUTO: 0.07 K/UL (ref 0–0.2)
BASOPHILS NFR BLD: 1.2 % (ref 0–1.9)
BILIRUB SERPL-MCNC: 0.3 MG/DL (ref 0.1–1)
BNP SERPL-MCNC: <10 PG/ML (ref 0–99)
BUN SERPL-MCNC: <3 MG/DL (ref 6–20)
CALCIUM SERPL-MCNC: 9.2 MG/DL (ref 8.7–10.5)
CHLORIDE SERPL-SCNC: 98 MMOL/L (ref 95–110)
CO2 SERPL-SCNC: 33 MMOL/L (ref 23–29)
CREAT SERPL-MCNC: 0.8 MG/DL (ref 0.5–1.4)
CTP QC/QA: YES
D DIMER PPP IA.FEU-MCNC: 0.91 MG/L FEU
DELSYS: ABNORMAL
DIFFERENTIAL METHOD: ABNORMAL
EOSINOPHIL # BLD AUTO: 0.2 K/UL (ref 0–0.5)
EOSINOPHIL NFR BLD: 3.4 % (ref 0–8)
ERYTHROCYTE [DISTWIDTH] IN BLOOD BY AUTOMATED COUNT: 19.5 % (ref 11.5–14.5)
EST. GFR  (AFRICAN AMERICAN): >60 ML/MIN/1.73 M^2
EST. GFR  (NON AFRICAN AMERICAN): >60 ML/MIN/1.73 M^2
FIO2: 36
FLOW: 4
GLUCOSE SERPL-MCNC: 72 MG/DL (ref 70–110)
HCO3 UR-SCNC: 35.8 MMOL/L (ref 24–28)
HCT VFR BLD AUTO: 44.4 % (ref 40–54)
HGB BLD-MCNC: 13.5 G/DL (ref 14–18)
IMM GRANULOCYTES # BLD AUTO: 0.01 K/UL (ref 0–0.04)
IMM GRANULOCYTES NFR BLD AUTO: 0.2 % (ref 0–0.5)
LYMPHOCYTES # BLD AUTO: 1.9 K/UL (ref 1–4.8)
LYMPHOCYTES NFR BLD: 32 % (ref 18–48)
MCH RBC QN AUTO: 25.1 PG (ref 27–31)
MCHC RBC AUTO-ENTMCNC: 30.4 G/DL (ref 32–36)
MCV RBC AUTO: 83 FL (ref 82–98)
MODE: ABNORMAL
MONOCYTES # BLD AUTO: 0.8 K/UL (ref 0.3–1)
MONOCYTES NFR BLD: 13.4 % (ref 4–15)
NEUTROPHILS # BLD AUTO: 2.9 K/UL (ref 1.8–7.7)
NEUTROPHILS NFR BLD: 49.8 % (ref 38–73)
NRBC BLD-RTO: 0 /100 WBC
PCO2 BLDA: 74 MMHG (ref 35–45)
PH SMN: 7.29 [PH] (ref 7.35–7.45)
PLATELET # BLD AUTO: 283 K/UL (ref 150–450)
PMV BLD AUTO: 10.1 FL (ref 9.2–12.9)
PO2 BLDA: 51 MMHG (ref 40–60)
POC BE: 9 MMOL/L
POC SATURATED O2: 80 % (ref 95–100)
POC TCO2: 38 MMOL/L (ref 24–29)
POTASSIUM SERPL-SCNC: 3.3 MMOL/L (ref 3.5–5.1)
PROT SERPL-MCNC: 7.9 G/DL (ref 6–8.4)
RBC # BLD AUTO: 5.38 M/UL (ref 4.6–6.2)
SAMPLE: ABNORMAL
SARS-COV-2 RDRP RESP QL NAA+PROBE: NEGATIVE
SITE: ABNORMAL
SODIUM SERPL-SCNC: 142 MMOL/L (ref 136–145)
SP02: 100
TROPONIN I SERPL DL<=0.01 NG/ML-MCNC: 0.04 NG/ML (ref 0–0.03)
WBC # BLD AUTO: 5.82 K/UL (ref 3.9–12.7)

## 2021-12-21 PROCEDURE — 83880 ASSAY OF NATRIURETIC PEPTIDE: CPT

## 2021-12-21 PROCEDURE — 93010 EKG 12-LEAD: ICD-10-PCS | Mod: ,,, | Performed by: INTERNAL MEDICINE

## 2021-12-21 PROCEDURE — 25000242 PHARM REV CODE 250 ALT 637 W/ HCPCS

## 2021-12-21 PROCEDURE — U0002 COVID-19 LAB TEST NON-CDC: HCPCS

## 2021-12-21 PROCEDURE — 94644 CONT INHLJ TX 1ST HOUR: CPT

## 2021-12-21 PROCEDURE — 86803 HEPATITIS C AB TEST: CPT | Performed by: EMERGENCY MEDICINE

## 2021-12-21 PROCEDURE — 94761 N-INVAS EAR/PLS OXIMETRY MLT: CPT

## 2021-12-21 PROCEDURE — 99285 EMERGENCY DEPT VISIT HI MDM: CPT | Mod: 25

## 2021-12-21 PROCEDURE — 27000221 HC OXYGEN, UP TO 24 HOURS

## 2021-12-21 PROCEDURE — 25500020 PHARM REV CODE 255: Performed by: EMERGENCY MEDICINE

## 2021-12-21 PROCEDURE — 87389 HIV-1 AG W/HIV-1&-2 AB AG IA: CPT | Performed by: EMERGENCY MEDICINE

## 2021-12-21 PROCEDURE — 93005 ELECTROCARDIOGRAM TRACING: CPT

## 2021-12-21 PROCEDURE — 99900035 HC TECH TIME PER 15 MIN (STAT)

## 2021-12-21 PROCEDURE — 93010 ELECTROCARDIOGRAM REPORT: CPT | Mod: ,,, | Performed by: INTERNAL MEDICINE

## 2021-12-21 PROCEDURE — 80053 COMPREHEN METABOLIC PANEL: CPT

## 2021-12-21 PROCEDURE — 99285 EMERGENCY DEPT VISIT HI MDM: CPT | Mod: CS,,, | Performed by: EMERGENCY MEDICINE

## 2021-12-21 PROCEDURE — 99285 PR EMERGENCY DEPT VISIT,LEVEL V: ICD-10-PCS | Mod: CS,,, | Performed by: EMERGENCY MEDICINE

## 2021-12-21 PROCEDURE — 94640 AIRWAY INHALATION TREATMENT: CPT

## 2021-12-21 PROCEDURE — 63600175 PHARM REV CODE 636 W HCPCS

## 2021-12-21 PROCEDURE — 25000242 PHARM REV CODE 250 ALT 637 W/ HCPCS: Performed by: EMERGENCY MEDICINE

## 2021-12-21 PROCEDURE — 85025 COMPLETE CBC W/AUTO DIFF WBC: CPT

## 2021-12-21 PROCEDURE — 84484 ASSAY OF TROPONIN QUANT: CPT

## 2021-12-21 PROCEDURE — 82803 BLOOD GASES ANY COMBINATION: CPT

## 2021-12-21 PROCEDURE — 85379 FIBRIN DEGRADATION QUANT: CPT

## 2021-12-21 RX ORDER — ALBUTEROL SULFATE 90 UG/1
4 AEROSOL, METERED RESPIRATORY (INHALATION)
Status: DISCONTINUED | OUTPATIENT
Start: 2021-12-21 | End: 2021-12-22

## 2021-12-21 RX ORDER — IPRATROPIUM BROMIDE 0.5 MG/2.5ML
SOLUTION RESPIRATORY (INHALATION)
Status: COMPLETED
Start: 2021-12-21 | End: 2021-12-21

## 2021-12-21 RX ORDER — ALBUTEROL SULFATE 2.5 MG/.5ML
10 SOLUTION RESPIRATORY (INHALATION) ONCE
Status: COMPLETED | OUTPATIENT
Start: 2021-12-21 | End: 2021-12-21

## 2021-12-21 RX ORDER — ALBUTEROL SULFATE 2.5 MG/.5ML
SOLUTION RESPIRATORY (INHALATION)
Status: COMPLETED
Start: 2021-12-21 | End: 2021-12-21

## 2021-12-21 RX ORDER — IPRATROPIUM BROMIDE 0.5 MG/2.5ML
1 SOLUTION RESPIRATORY (INHALATION) ONCE
Status: COMPLETED | OUTPATIENT
Start: 2021-12-21 | End: 2021-12-21

## 2021-12-21 RX ADMIN — ALBUTEROL SULFATE 4 PUFF: 108 INHALANT RESPIRATORY (INHALATION) at 10:12

## 2021-12-21 RX ADMIN — ALBUTEROL SULFATE 4 PUFF: 108 INHALANT RESPIRATORY (INHALATION) at 08:12

## 2021-12-21 RX ADMIN — IPRATROPIUM BROMIDE 1 MG: 0.5 SOLUTION RESPIRATORY (INHALATION) at 06:12

## 2021-12-21 RX ADMIN — IOHEXOL 100 ML: 350 INJECTION, SOLUTION INTRAVENOUS at 09:12

## 2021-12-21 RX ADMIN — ALBUTEROL SULFATE 10 MG: 2.5 SOLUTION RESPIRATORY (INHALATION) at 06:12

## 2021-12-21 RX ADMIN — PREDNISONE 50 MG: 20 TABLET ORAL at 06:12

## 2021-12-22 ENCOUNTER — PATIENT OUTREACH (OUTPATIENT)
Dept: EMERGENCY MEDICINE | Facility: HOSPITAL | Age: 61
End: 2021-12-22
Payer: MEDICAID

## 2021-12-22 LAB
ANISOCYTOSIS BLD QL SMEAR: SLIGHT
BASOPHILS # BLD AUTO: 0.02 K/UL (ref 0–0.2)
BASOPHILS NFR BLD: 0.7 % (ref 0–1.9)
DIFFERENTIAL METHOD: ABNORMAL
EOSINOPHIL # BLD AUTO: 0 K/UL (ref 0–0.5)
EOSINOPHIL NFR BLD: 0 % (ref 0–8)
ERYTHROCYTE [DISTWIDTH] IN BLOOD BY AUTOMATED COUNT: 19.7 % (ref 11.5–14.5)
GIANT PLATELETS BLD QL SMEAR: PRESENT
HCT VFR BLD AUTO: 41 % (ref 40–54)
HCV AB SERPL QL IA: NEGATIVE
HGB BLD-MCNC: 12.7 G/DL (ref 14–18)
HIV 1+2 AB+HIV1 P24 AG SERPL QL IA: NEGATIVE
IMM GRANULOCYTES # BLD AUTO: 0.01 K/UL (ref 0–0.04)
IMM GRANULOCYTES NFR BLD AUTO: 0.3 % (ref 0–0.5)
LYMPHOCYTES # BLD AUTO: 0.4 K/UL (ref 1–4.8)
LYMPHOCYTES NFR BLD: 15 % (ref 18–48)
MCH RBC QN AUTO: 25 PG (ref 27–31)
MCHC RBC AUTO-ENTMCNC: 31 G/DL (ref 32–36)
MCV RBC AUTO: 81 FL (ref 82–98)
MONOCYTES # BLD AUTO: 0 K/UL (ref 0.3–1)
MONOCYTES NFR BLD: 1.4 % (ref 4–15)
NEUTROPHILS # BLD AUTO: 2.4 K/UL (ref 1.8–7.7)
NEUTROPHILS NFR BLD: 82.6 % (ref 38–73)
NRBC BLD-RTO: 0 /100 WBC
PLATELET # BLD AUTO: 273 K/UL (ref 150–450)
PLATELET BLD QL SMEAR: ABNORMAL
PMV BLD AUTO: 10.5 FL (ref 9.2–12.9)
POIKILOCYTOSIS BLD QL SMEAR: SLIGHT
RBC # BLD AUTO: 5.08 M/UL (ref 4.6–6.2)
TARGETS BLD QL SMEAR: ABNORMAL
TROPONIN I SERPL DL<=0.01 NG/ML-MCNC: 0.03 NG/ML (ref 0–0.03)
WBC # BLD AUTO: 2.93 K/UL (ref 3.9–12.7)

## 2021-12-22 PROCEDURE — 94640 AIRWAY INHALATION TREATMENT: CPT

## 2021-12-22 PROCEDURE — 99900035 HC TECH TIME PER 15 MIN (STAT)

## 2021-12-22 PROCEDURE — 99220 PR INITIAL OBSERVATION CARE,LEVL III: CPT | Mod: ,,, | Performed by: PHYSICIAN ASSISTANT

## 2021-12-22 PROCEDURE — 84484 ASSAY OF TROPONIN QUANT: CPT | Performed by: EMERGENCY MEDICINE

## 2021-12-22 PROCEDURE — 27000190 HC CPAP FULL FACE MASK W/VALVE

## 2021-12-22 PROCEDURE — 93010 EKG 12-LEAD: ICD-10-PCS | Mod: ,,, | Performed by: INTERNAL MEDICINE

## 2021-12-22 PROCEDURE — 93010 ELECTROCARDIOGRAM REPORT: CPT | Mod: ,,, | Performed by: INTERNAL MEDICINE

## 2021-12-22 PROCEDURE — 27000221 HC OXYGEN, UP TO 24 HOURS

## 2021-12-22 PROCEDURE — 99900031 HC PATIENT EDUCATION (STAT)

## 2021-12-22 PROCEDURE — G0378 HOSPITAL OBSERVATION PER HR: HCPCS

## 2021-12-22 PROCEDURE — 25000003 PHARM REV CODE 250: Performed by: PHYSICIAN ASSISTANT

## 2021-12-22 PROCEDURE — 85025 COMPLETE CBC W/AUTO DIFF WBC: CPT | Performed by: PHYSICIAN ASSISTANT

## 2021-12-22 PROCEDURE — 94660 CPAP INITIATION&MGMT: CPT

## 2021-12-22 PROCEDURE — 63600175 PHARM REV CODE 636 W HCPCS: Performed by: PHYSICIAN ASSISTANT

## 2021-12-22 PROCEDURE — 25000242 PHARM REV CODE 250 ALT 637 W/ HCPCS: Performed by: PHYSICIAN ASSISTANT

## 2021-12-22 PROCEDURE — 94761 N-INVAS EAR/PLS OXIMETRY MLT: CPT

## 2021-12-22 PROCEDURE — S4991 NICOTINE PATCH NONLEGEND: HCPCS | Performed by: PHYSICIAN ASSISTANT

## 2021-12-22 PROCEDURE — 96372 THER/PROPH/DIAG INJ SC/IM: CPT | Mod: 59

## 2021-12-22 PROCEDURE — 93005 ELECTROCARDIOGRAM TRACING: CPT

## 2021-12-22 PROCEDURE — 25000242 PHARM REV CODE 250 ALT 637 W/ HCPCS: Performed by: INTERNAL MEDICINE

## 2021-12-22 PROCEDURE — 99220 PR INITIAL OBSERVATION CARE,LEVL III: ICD-10-PCS | Mod: ,,, | Performed by: PHYSICIAN ASSISTANT

## 2021-12-22 PROCEDURE — 27100171 HC OXYGEN HIGH FLOW UP TO 24 HOURS

## 2021-12-22 RX ORDER — IPRATROPIUM BROMIDE AND ALBUTEROL SULFATE 2.5; .5 MG/3ML; MG/3ML
3 SOLUTION RESPIRATORY (INHALATION) EVERY 4 HOURS PRN
Status: DISCONTINUED | OUTPATIENT
Start: 2021-12-22 | End: 2021-12-23 | Stop reason: HOSPADM

## 2021-12-22 RX ORDER — FLUTICASONE FUROATE AND VILANTEROL 200; 25 UG/1; UG/1
1 POWDER RESPIRATORY (INHALATION) DAILY
Status: DISCONTINUED | OUTPATIENT
Start: 2021-12-22 | End: 2021-12-23 | Stop reason: HOSPADM

## 2021-12-22 RX ORDER — THIAMINE HCL 100 MG
100 TABLET ORAL DAILY
Status: DISCONTINUED | OUTPATIENT
Start: 2021-12-22 | End: 2021-12-23 | Stop reason: HOSPADM

## 2021-12-22 RX ORDER — NALOXONE HCL 0.4 MG/ML
0.02 VIAL (ML) INJECTION
Status: DISCONTINUED | OUTPATIENT
Start: 2021-12-22 | End: 2021-12-23 | Stop reason: HOSPADM

## 2021-12-22 RX ORDER — PREDNISONE 20 MG/1
40 TABLET ORAL DAILY
Status: DISCONTINUED | OUTPATIENT
Start: 2021-12-22 | End: 2021-12-22

## 2021-12-22 RX ORDER — IBUPROFEN 200 MG
1 TABLET ORAL DAILY
Status: DISCONTINUED | OUTPATIENT
Start: 2021-12-22 | End: 2021-12-22

## 2021-12-22 RX ORDER — ENOXAPARIN SODIUM 100 MG/ML
40 INJECTION SUBCUTANEOUS EVERY 24 HOURS
Status: DISCONTINUED | OUTPATIENT
Start: 2021-12-22 | End: 2021-12-23 | Stop reason: HOSPADM

## 2021-12-22 RX ORDER — MAG HYDROX/ALUMINUM HYD/SIMETH 200-200-20
30 SUSPENSION, ORAL (FINAL DOSE FORM) ORAL 4 TIMES DAILY PRN
Status: DISCONTINUED | OUTPATIENT
Start: 2021-12-22 | End: 2021-12-23 | Stop reason: HOSPADM

## 2021-12-22 RX ORDER — IBUPROFEN 200 MG
16 TABLET ORAL
Status: DISCONTINUED | OUTPATIENT
Start: 2021-12-22 | End: 2021-12-23 | Stop reason: HOSPADM

## 2021-12-22 RX ORDER — POLYETHYLENE GLYCOL 3350 17 G/17G
17 POWDER, FOR SOLUTION ORAL DAILY PRN
Status: DISCONTINUED | OUTPATIENT
Start: 2021-12-22 | End: 2021-12-23 | Stop reason: HOSPADM

## 2021-12-22 RX ORDER — GLUCAGON 1 MG
1 KIT INJECTION
Status: DISCONTINUED | OUTPATIENT
Start: 2021-12-22 | End: 2021-12-23 | Stop reason: HOSPADM

## 2021-12-22 RX ORDER — ATORVASTATIN CALCIUM 40 MG/1
40 TABLET, FILM COATED ORAL DAILY
Status: DISCONTINUED | OUTPATIENT
Start: 2021-12-22 | End: 2021-12-23 | Stop reason: HOSPADM

## 2021-12-22 RX ORDER — PREDNISONE 20 MG/1
40 TABLET ORAL DAILY
Status: DISCONTINUED | OUTPATIENT
Start: 2021-12-22 | End: 2021-12-23 | Stop reason: HOSPADM

## 2021-12-22 RX ORDER — AMLODIPINE BESYLATE 10 MG/1
10 TABLET ORAL DAILY
Status: DISCONTINUED | OUTPATIENT
Start: 2021-12-22 | End: 2021-12-23 | Stop reason: HOSPADM

## 2021-12-22 RX ORDER — IPRATROPIUM BROMIDE AND ALBUTEROL SULFATE 2.5; .5 MG/3ML; MG/3ML
3 SOLUTION RESPIRATORY (INHALATION)
Status: DISCONTINUED | OUTPATIENT
Start: 2021-12-22 | End: 2021-12-22

## 2021-12-22 RX ORDER — HYDRALAZINE HYDROCHLORIDE 10 MG/1
10 TABLET, FILM COATED ORAL EVERY 8 HOURS PRN
Status: DISCONTINUED | OUTPATIENT
Start: 2021-12-22 | End: 2021-12-23 | Stop reason: HOSPADM

## 2021-12-22 RX ORDER — ACETAMINOPHEN 325 MG/1
650 TABLET ORAL EVERY 4 HOURS PRN
Status: DISCONTINUED | OUTPATIENT
Start: 2021-12-22 | End: 2021-12-23 | Stop reason: HOSPADM

## 2021-12-22 RX ORDER — FOLIC ACID 1 MG/1
1 TABLET ORAL DAILY
Status: DISCONTINUED | OUTPATIENT
Start: 2021-12-22 | End: 2021-12-23 | Stop reason: HOSPADM

## 2021-12-22 RX ORDER — ASPIRIN 81 MG/1
81 TABLET ORAL DAILY
Status: DISCONTINUED | OUTPATIENT
Start: 2021-12-22 | End: 2021-12-23 | Stop reason: HOSPADM

## 2021-12-22 RX ORDER — IBUPROFEN 200 MG
24 TABLET ORAL
Status: DISCONTINUED | OUTPATIENT
Start: 2021-12-22 | End: 2021-12-23 | Stop reason: HOSPADM

## 2021-12-22 RX ORDER — SIMETHICONE 80 MG
1 TABLET,CHEWABLE ORAL 4 TIMES DAILY PRN
Status: DISCONTINUED | OUTPATIENT
Start: 2021-12-22 | End: 2021-12-23 | Stop reason: HOSPADM

## 2021-12-22 RX ORDER — IPRATROPIUM BROMIDE AND ALBUTEROL SULFATE 2.5; .5 MG/3ML; MG/3ML
3 SOLUTION RESPIRATORY (INHALATION) EVERY 4 HOURS
Status: DISCONTINUED | OUTPATIENT
Start: 2021-12-22 | End: 2021-12-23 | Stop reason: HOSPADM

## 2021-12-22 RX ORDER — ONDANSETRON 8 MG/1
8 TABLET, ORALLY DISINTEGRATING ORAL EVERY 8 HOURS PRN
Status: DISCONTINUED | OUTPATIENT
Start: 2021-12-22 | End: 2021-12-23 | Stop reason: HOSPADM

## 2021-12-22 RX ORDER — PROCHLORPERAZINE EDISYLATE 5 MG/ML
5 INJECTION INTRAMUSCULAR; INTRAVENOUS EVERY 6 HOURS PRN
Status: DISCONTINUED | OUTPATIENT
Start: 2021-12-22 | End: 2021-12-23 | Stop reason: HOSPADM

## 2021-12-22 RX ORDER — SENNOSIDES 8.6 MG/1
1 TABLET ORAL DAILY PRN
Status: DISCONTINUED | OUTPATIENT
Start: 2021-12-22 | End: 2021-12-23 | Stop reason: HOSPADM

## 2021-12-22 RX ORDER — SODIUM CHLORIDE 0.9 % (FLUSH) 0.9 %
10 SYRINGE (ML) INJECTION EVERY 8 HOURS PRN
Status: DISCONTINUED | OUTPATIENT
Start: 2021-12-22 | End: 2021-12-23 | Stop reason: HOSPADM

## 2021-12-22 RX ORDER — SODIUM CHLORIDE 0.9 % (FLUSH) 0.9 %
3 SYRINGE (ML) INJECTION
Status: DISCONTINUED | OUTPATIENT
Start: 2021-12-22 | End: 2021-12-23 | Stop reason: HOSPADM

## 2021-12-22 RX ORDER — NICOTINE 7MG/24HR
1 PATCH, TRANSDERMAL 24 HOURS TRANSDERMAL DAILY
Status: DISCONTINUED | OUTPATIENT
Start: 2021-12-22 | End: 2021-12-23 | Stop reason: HOSPADM

## 2021-12-22 RX ORDER — TALC
6 POWDER (GRAM) TOPICAL NIGHTLY PRN
Status: DISCONTINUED | OUTPATIENT
Start: 2021-12-22 | End: 2021-12-23 | Stop reason: HOSPADM

## 2021-12-22 RX ORDER — ALBUTEROL SULFATE 90 UG/1
4 AEROSOL, METERED RESPIRATORY (INHALATION) EVERY 4 HOURS PRN
Status: DISCONTINUED | OUTPATIENT
Start: 2021-12-22 | End: 2021-12-23 | Stop reason: HOSPADM

## 2021-12-22 RX ADMIN — ALBUTEROL SULFATE 4 PUFF: 108 INHALANT RESPIRATORY (INHALATION) at 01:12

## 2021-12-22 RX ADMIN — ALBUTEROL SULFATE 4 PUFF: 108 INHALANT RESPIRATORY (INHALATION) at 12:12

## 2021-12-22 RX ADMIN — ALBUTEROL SULFATE 4 PUFF: 108 INHALANT RESPIRATORY (INHALATION) at 08:12

## 2021-12-22 RX ADMIN — PREDNISONE 40 MG: 20 TABLET ORAL at 08:12

## 2021-12-22 RX ADMIN — ALBUTEROL SULFATE 4 PUFF: 108 INHALANT RESPIRATORY (INHALATION) at 02:12

## 2021-12-22 RX ADMIN — ALBUTEROL SULFATE 4 PUFF: 108 INHALANT RESPIRATORY (INHALATION) at 04:12

## 2021-12-22 RX ADMIN — ONDANSETRON 8 MG: 8 TABLET, ORALLY DISINTEGRATING ORAL at 06:12

## 2021-12-22 RX ADMIN — AMLODIPINE BESYLATE 10 MG: 10 TABLET ORAL at 08:12

## 2021-12-22 RX ADMIN — IPRATROPIUM BROMIDE AND ALBUTEROL SULFATE 3 ML: .5; 3 SOLUTION RESPIRATORY (INHALATION) at 09:12

## 2021-12-22 RX ADMIN — ASPIRIN 81 MG: 81 TABLET, COATED ORAL at 08:12

## 2021-12-22 RX ADMIN — ALUMINUM HYDROXIDE, MAGNESIUM HYDROXIDE, AND SIMETHICONE 30 ML: 200; 200; 20 SUSPENSION ORAL at 09:12

## 2021-12-22 RX ADMIN — IPRATROPIUM BROMIDE AND ALBUTEROL SULFATE 3 ML: .5; 3 SOLUTION RESPIRATORY (INHALATION) at 01:12

## 2021-12-22 RX ADMIN — ALUMINUM HYDROXIDE, MAGNESIUM HYDROXIDE, AND SIMETHICONE 30 ML: 200; 200; 20 SUSPENSION ORAL at 10:12

## 2021-12-22 RX ADMIN — ALUMINUM HYDROXIDE, MAGNESIUM HYDROXIDE, AND SIMETHICONE 30 ML: 200; 200; 20 SUSPENSION ORAL at 04:12

## 2021-12-22 RX ADMIN — MULTIPLE VITAMINS W/ MINERALS TAB 1 TABLET: TAB at 08:12

## 2021-12-22 RX ADMIN — IPRATROPIUM BROMIDE AND ALBUTEROL SULFATE 3 ML: .5; 2.5 SOLUTION RESPIRATORY (INHALATION) at 04:12

## 2021-12-22 RX ADMIN — Medication 100 MG: at 08:12

## 2021-12-22 RX ADMIN — NICOTINE 1 PATCH: 7 PATCH, EXTENDED RELEASE TRANSDERMAL at 11:12

## 2021-12-22 RX ADMIN — FLUTICASONE FUROATE AND VILANTEROL TRIFENATATE 1 PUFF: 200; 25 POWDER RESPIRATORY (INHALATION) at 01:12

## 2021-12-22 RX ADMIN — FOLIC ACID 1 MG: 1 TABLET ORAL at 08:12

## 2021-12-22 RX ADMIN — ALBUTEROL SULFATE 4 PUFF: 108 INHALANT RESPIRATORY (INHALATION) at 07:12

## 2021-12-22 RX ADMIN — IPRATROPIUM BROMIDE AND ALBUTEROL SULFATE 3 ML: .5; 3 SOLUTION RESPIRATORY (INHALATION) at 11:12

## 2021-12-22 RX ADMIN — ALBUTEROL SULFATE 4 PUFF: 108 INHALANT RESPIRATORY (INHALATION) at 06:12

## 2021-12-22 RX ADMIN — IPRATROPIUM BROMIDE AND ALBUTEROL SULFATE 3 ML: .5; 3 SOLUTION RESPIRATORY (INHALATION) at 07:12

## 2021-12-22 RX ADMIN — ATORVASTATIN CALCIUM 40 MG: 20 TABLET, FILM COATED ORAL at 08:12

## 2021-12-22 RX ADMIN — ALBUTEROL SULFATE 4 PUFF: 108 INHALANT RESPIRATORY (INHALATION) at 10:12

## 2021-12-22 RX ADMIN — ENOXAPARIN SODIUM 40 MG: 100 INJECTION SUBCUTANEOUS at 06:12

## 2021-12-22 RX ADMIN — ONDANSETRON 8 MG: 8 TABLET, ORALLY DISINTEGRATING ORAL at 04:12

## 2021-12-23 ENCOUNTER — TELEPHONE (OUTPATIENT)
Dept: PULMONOLOGY | Facility: CLINIC | Age: 61
End: 2021-12-23
Payer: MEDICAID

## 2021-12-23 VITALS
OXYGEN SATURATION: 98 % | TEMPERATURE: 98 F | SYSTOLIC BLOOD PRESSURE: 133 MMHG | BODY MASS INDEX: 21.09 KG/M2 | DIASTOLIC BLOOD PRESSURE: 85 MMHG | WEIGHT: 139.13 LBS | RESPIRATION RATE: 20 BRPM | HEART RATE: 58 BPM | HEIGHT: 68 IN

## 2021-12-23 LAB
ANION GAP SERPL CALC-SCNC: 6 MMOL/L (ref 8–16)
BASOPHILS # BLD AUTO: 0.02 K/UL (ref 0–0.2)
BASOPHILS NFR BLD: 0.3 % (ref 0–1.9)
BUN SERPL-MCNC: 5 MG/DL (ref 6–20)
CALCIUM SERPL-MCNC: 9.4 MG/DL (ref 8.7–10.5)
CHLORIDE SERPL-SCNC: 95 MMOL/L (ref 95–110)
CO2 SERPL-SCNC: 35 MMOL/L (ref 23–29)
CREAT SERPL-MCNC: 0.9 MG/DL (ref 0.5–1.4)
DIFFERENTIAL METHOD: ABNORMAL
EOSINOPHIL # BLD AUTO: 0 K/UL (ref 0–0.5)
EOSINOPHIL NFR BLD: 0.1 % (ref 0–8)
ERYTHROCYTE [DISTWIDTH] IN BLOOD BY AUTOMATED COUNT: 19.2 % (ref 11.5–14.5)
EST. GFR  (AFRICAN AMERICAN): >60 ML/MIN/1.73 M^2
EST. GFR  (NON AFRICAN AMERICAN): >60 ML/MIN/1.73 M^2
GLUCOSE SERPL-MCNC: 90 MG/DL (ref 70–110)
HCT VFR BLD AUTO: 43.6 % (ref 40–54)
HGB BLD-MCNC: 13.4 G/DL (ref 14–18)
IMM GRANULOCYTES # BLD AUTO: 0.01 K/UL (ref 0–0.04)
IMM GRANULOCYTES NFR BLD AUTO: 0.1 % (ref 0–0.5)
LYMPHOCYTES # BLD AUTO: 1.4 K/UL (ref 1–4.8)
LYMPHOCYTES NFR BLD: 20.9 % (ref 18–48)
MAGNESIUM SERPL-MCNC: 2.2 MG/DL (ref 1.6–2.6)
MCH RBC QN AUTO: 25.2 PG (ref 27–31)
MCHC RBC AUTO-ENTMCNC: 30.7 G/DL (ref 32–36)
MCV RBC AUTO: 82 FL (ref 82–98)
MONOCYTES # BLD AUTO: 1.1 K/UL (ref 0.3–1)
MONOCYTES NFR BLD: 15.9 % (ref 4–15)
NEUTROPHILS # BLD AUTO: 4.3 K/UL (ref 1.8–7.7)
NEUTROPHILS NFR BLD: 62.7 % (ref 38–73)
NRBC BLD-RTO: 0 /100 WBC
PHOSPHATE SERPL-MCNC: 2.7 MG/DL (ref 2.7–4.5)
PLATELET # BLD AUTO: 259 K/UL (ref 150–450)
PMV BLD AUTO: 10.8 FL (ref 9.2–12.9)
POTASSIUM SERPL-SCNC: 3.7 MMOL/L (ref 3.5–5.1)
RBC # BLD AUTO: 5.32 M/UL (ref 4.6–6.2)
SODIUM SERPL-SCNC: 136 MMOL/L (ref 136–145)
WBC # BLD AUTO: 6.8 K/UL (ref 3.9–12.7)

## 2021-12-23 PROCEDURE — 25000242 PHARM REV CODE 250 ALT 637 W/ HCPCS: Performed by: PHYSICIAN ASSISTANT

## 2021-12-23 PROCEDURE — 85025 COMPLETE CBC W/AUTO DIFF WBC: CPT | Performed by: PHYSICIAN ASSISTANT

## 2021-12-23 PROCEDURE — 63600175 PHARM REV CODE 636 W HCPCS: Performed by: PHYSICIAN ASSISTANT

## 2021-12-23 PROCEDURE — 94640 AIRWAY INHALATION TREATMENT: CPT

## 2021-12-23 PROCEDURE — 94760 N-INVAS EAR/PLS OXIMETRY 1: CPT

## 2021-12-23 PROCEDURE — 84100 ASSAY OF PHOSPHORUS: CPT | Performed by: PHYSICIAN ASSISTANT

## 2021-12-23 PROCEDURE — G0378 HOSPITAL OBSERVATION PER HR: HCPCS

## 2021-12-23 PROCEDURE — 99225 PR SUBSEQUENT OBSERVATION CARE,LEVEL II: CPT | Mod: ,,, | Performed by: PHYSICIAN ASSISTANT

## 2021-12-23 PROCEDURE — 36415 COLL VENOUS BLD VENIPUNCTURE: CPT | Performed by: PHYSICIAN ASSISTANT

## 2021-12-23 PROCEDURE — 99225 PR SUBSEQUENT OBSERVATION CARE,LEVEL II: ICD-10-PCS | Mod: ,,, | Performed by: PHYSICIAN ASSISTANT

## 2021-12-23 PROCEDURE — S4991 NICOTINE PATCH NONLEGEND: HCPCS | Performed by: PHYSICIAN ASSISTANT

## 2021-12-23 PROCEDURE — 99900035 HC TECH TIME PER 15 MIN (STAT)

## 2021-12-23 PROCEDURE — 80048 BASIC METABOLIC PNL TOTAL CA: CPT | Performed by: PHYSICIAN ASSISTANT

## 2021-12-23 PROCEDURE — 94761 N-INVAS EAR/PLS OXIMETRY MLT: CPT

## 2021-12-23 PROCEDURE — 25000003 PHARM REV CODE 250: Performed by: PHYSICIAN ASSISTANT

## 2021-12-23 PROCEDURE — 27000221 HC OXYGEN, UP TO 24 HOURS

## 2021-12-23 PROCEDURE — 83735 ASSAY OF MAGNESIUM: CPT | Performed by: PHYSICIAN ASSISTANT

## 2021-12-23 RX ORDER — PREDNISONE 20 MG/1
40 TABLET ORAL DAILY
Qty: 6 TABLET | Refills: 0 | Status: SHIPPED | OUTPATIENT
Start: 2021-12-24 | End: 2021-12-27

## 2021-12-23 RX ADMIN — IPRATROPIUM BROMIDE AND ALBUTEROL SULFATE 3 ML: .5; 3 SOLUTION RESPIRATORY (INHALATION) at 04:12

## 2021-12-23 RX ADMIN — MULTIPLE VITAMINS W/ MINERALS TAB 1 TABLET: TAB at 10:12

## 2021-12-23 RX ADMIN — ASPIRIN 81 MG: 81 TABLET, COATED ORAL at 10:12

## 2021-12-23 RX ADMIN — AMLODIPINE BESYLATE 10 MG: 10 TABLET ORAL at 10:12

## 2021-12-23 RX ADMIN — ATORVASTATIN CALCIUM 40 MG: 20 TABLET, FILM COATED ORAL at 10:12

## 2021-12-23 RX ADMIN — FLUTICASONE FUROATE AND VILANTEROL TRIFENATATE 1 PUFF: 200; 25 POWDER RESPIRATORY (INHALATION) at 07:12

## 2021-12-23 RX ADMIN — Medication 100 MG: at 10:12

## 2021-12-23 RX ADMIN — IPRATROPIUM BROMIDE AND ALBUTEROL SULFATE 3 ML: .5; 3 SOLUTION RESPIRATORY (INHALATION) at 11:12

## 2021-12-23 RX ADMIN — FOLIC ACID 1 MG: 1 TABLET ORAL at 10:12

## 2021-12-23 RX ADMIN — ALUMINUM HYDROXIDE, MAGNESIUM HYDROXIDE, AND SIMETHICONE 30 ML: 200; 200; 20 SUSPENSION ORAL at 05:12

## 2021-12-23 RX ADMIN — PREDNISONE 40 MG: 20 TABLET ORAL at 10:12

## 2021-12-23 RX ADMIN — IPRATROPIUM BROMIDE AND ALBUTEROL SULFATE 3 ML: .5; 3 SOLUTION RESPIRATORY (INHALATION) at 07:12

## 2021-12-23 RX ADMIN — NICOTINE 1 PATCH: 7 PATCH, EXTENDED RELEASE TRANSDERMAL at 10:12

## 2022-01-01 ENCOUNTER — HOSPITAL ENCOUNTER (EMERGENCY)
Facility: HOSPITAL | Age: 62
Discharge: HOME | End: 2022-01-02
Attending: EMERGENCY MEDICINE
Payer: MEDICAID

## 2022-01-01 DIAGNOSIS — R06.02 SHORTNESS OF BREATH: ICD-10-CM

## 2022-01-01 DIAGNOSIS — R06.02 SOB (SHORTNESS OF BREATH): ICD-10-CM

## 2022-01-01 DIAGNOSIS — J44.1 COPD EXACERBATION: Primary | ICD-10-CM

## 2022-01-01 LAB
CTP QC/QA: YES
SARS-COV-2 RDRP RESP QL NAA+PROBE: NEGATIVE

## 2022-01-01 PROCEDURE — 99285 EMERGENCY DEPT VISIT HI MDM: CPT | Mod: 25

## 2022-01-01 PROCEDURE — 93005 ELECTROCARDIOGRAM TRACING: CPT

## 2022-01-01 PROCEDURE — 99284 PR EMERGENCY DEPT VISIT,LEVEL IV: ICD-10-PCS | Mod: CS,,, | Performed by: EMERGENCY MEDICINE

## 2022-01-01 PROCEDURE — U0002 COVID-19 LAB TEST NON-CDC: HCPCS | Performed by: EMERGENCY MEDICINE

## 2022-01-01 PROCEDURE — 27000221 HC OXYGEN, UP TO 24 HOURS

## 2022-01-01 PROCEDURE — 93010 EKG 12-LEAD: ICD-10-PCS | Mod: ,,, | Performed by: INTERNAL MEDICINE

## 2022-01-01 PROCEDURE — 99284 EMERGENCY DEPT VISIT MOD MDM: CPT | Mod: CS,,, | Performed by: EMERGENCY MEDICINE

## 2022-01-01 PROCEDURE — 94640 AIRWAY INHALATION TREATMENT: CPT

## 2022-01-01 PROCEDURE — 93010 ELECTROCARDIOGRAM REPORT: CPT | Mod: ,,, | Performed by: INTERNAL MEDICINE

## 2022-01-01 PROCEDURE — 25000242 PHARM REV CODE 250 ALT 637 W/ HCPCS: Performed by: STUDENT IN AN ORGANIZED HEALTH CARE EDUCATION/TRAINING PROGRAM

## 2022-01-01 PROCEDURE — 94761 N-INVAS EAR/PLS OXIMETRY MLT: CPT

## 2022-01-01 RX ORDER — PREDNISONE 20 MG/1
60 TABLET ORAL
Status: COMPLETED | OUTPATIENT
Start: 2022-01-01 | End: 2022-01-02

## 2022-01-01 RX ORDER — IPRATROPIUM BROMIDE AND ALBUTEROL SULFATE 2.5; .5 MG/3ML; MG/3ML
3 SOLUTION RESPIRATORY (INHALATION)
Status: COMPLETED | OUTPATIENT
Start: 2022-01-01 | End: 2022-01-02

## 2022-01-01 RX ADMIN — IPRATROPIUM BROMIDE AND ALBUTEROL SULFATE 3 ML: .5; 3 SOLUTION RESPIRATORY (INHALATION) at 11:01

## 2022-01-02 VITALS
TEMPERATURE: 98 F | OXYGEN SATURATION: 95 % | HEART RATE: 89 BPM | SYSTOLIC BLOOD PRESSURE: 163 MMHG | DIASTOLIC BLOOD PRESSURE: 82 MMHG | RESPIRATION RATE: 20 BRPM

## 2022-01-02 LAB
ALBUMIN SERPL BCP-MCNC: 3.2 G/DL (ref 3.5–5.2)
ALP SERPL-CCNC: 114 U/L (ref 55–135)
ALT SERPL W/O P-5'-P-CCNC: 73 U/L (ref 10–44)
ANION GAP SERPL CALC-SCNC: 15 MMOL/L (ref 8–16)
AST SERPL-CCNC: 81 U/L (ref 10–40)
BASOPHILS # BLD AUTO: 0.08 K/UL (ref 0–0.2)
BASOPHILS NFR BLD: 1.1 % (ref 0–1.9)
BILIRUB SERPL-MCNC: 0.5 MG/DL (ref 0.1–1)
BNP SERPL-MCNC: 84 PG/ML (ref 0–99)
BUN SERPL-MCNC: 4 MG/DL (ref 6–20)
CALCIUM SERPL-MCNC: 9 MG/DL (ref 8.7–10.5)
CHLORIDE SERPL-SCNC: 99 MMOL/L (ref 95–110)
CO2 SERPL-SCNC: 23 MMOL/L (ref 23–29)
CREAT SERPL-MCNC: 0.8 MG/DL (ref 0.5–1.4)
DIFFERENTIAL METHOD: ABNORMAL
EOSINOPHIL # BLD AUTO: 0.3 K/UL (ref 0–0.5)
EOSINOPHIL NFR BLD: 3.8 % (ref 0–8)
ERYTHROCYTE [DISTWIDTH] IN BLOOD BY AUTOMATED COUNT: 18.5 % (ref 11.5–14.5)
EST. GFR  (AFRICAN AMERICAN): >60 ML/MIN/1.73 M^2
EST. GFR  (NON AFRICAN AMERICAN): >60 ML/MIN/1.73 M^2
GLUCOSE SERPL-MCNC: 78 MG/DL (ref 70–110)
HCT VFR BLD AUTO: 41.3 % (ref 40–54)
HGB BLD-MCNC: 12.8 G/DL (ref 14–18)
IMM GRANULOCYTES # BLD AUTO: 0.02 K/UL (ref 0–0.04)
IMM GRANULOCYTES NFR BLD AUTO: 0.3 % (ref 0–0.5)
LYMPHOCYTES # BLD AUTO: 2.3 K/UL (ref 1–4.8)
LYMPHOCYTES NFR BLD: 32.5 % (ref 18–48)
MCH RBC QN AUTO: 24.9 PG (ref 27–31)
MCHC RBC AUTO-ENTMCNC: 31 G/DL (ref 32–36)
MCV RBC AUTO: 80 FL (ref 82–98)
MONOCYTES # BLD AUTO: 0.9 K/UL (ref 0.3–1)
MONOCYTES NFR BLD: 12.6 % (ref 4–15)
NEUTROPHILS # BLD AUTO: 3.5 K/UL (ref 1.8–7.7)
NEUTROPHILS NFR BLD: 49.7 % (ref 38–73)
NRBC BLD-RTO: 0 /100 WBC
PLATELET # BLD AUTO: 216 K/UL (ref 150–450)
PMV BLD AUTO: 9.9 FL (ref 9.2–12.9)
POTASSIUM SERPL-SCNC: 4.3 MMOL/L (ref 3.5–5.1)
PROT SERPL-MCNC: 7.4 G/DL (ref 6–8.4)
RBC # BLD AUTO: 5.15 M/UL (ref 4.6–6.2)
SODIUM SERPL-SCNC: 137 MMOL/L (ref 136–145)
TROPONIN I SERPL DL<=0.01 NG/ML-MCNC: 0.04 NG/ML (ref 0–0.03)
WBC # BLD AUTO: 7.08 K/UL (ref 3.9–12.7)

## 2022-01-02 PROCEDURE — 94761 N-INVAS EAR/PLS OXIMETRY MLT: CPT

## 2022-01-02 PROCEDURE — 94640 AIRWAY INHALATION TREATMENT: CPT

## 2022-01-02 PROCEDURE — 84484 ASSAY OF TROPONIN QUANT: CPT | Performed by: STUDENT IN AN ORGANIZED HEALTH CARE EDUCATION/TRAINING PROGRAM

## 2022-01-02 PROCEDURE — 80053 COMPREHEN METABOLIC PANEL: CPT | Performed by: STUDENT IN AN ORGANIZED HEALTH CARE EDUCATION/TRAINING PROGRAM

## 2022-01-02 PROCEDURE — 99900035 HC TECH TIME PER 15 MIN (STAT)

## 2022-01-02 PROCEDURE — 83880 ASSAY OF NATRIURETIC PEPTIDE: CPT | Performed by: STUDENT IN AN ORGANIZED HEALTH CARE EDUCATION/TRAINING PROGRAM

## 2022-01-02 PROCEDURE — 25000242 PHARM REV CODE 250 ALT 637 W/ HCPCS: Performed by: STUDENT IN AN ORGANIZED HEALTH CARE EDUCATION/TRAINING PROGRAM

## 2022-01-02 PROCEDURE — 27000221 HC OXYGEN, UP TO 24 HOURS

## 2022-01-02 PROCEDURE — 85025 COMPLETE CBC W/AUTO DIFF WBC: CPT | Performed by: STUDENT IN AN ORGANIZED HEALTH CARE EDUCATION/TRAINING PROGRAM

## 2022-01-02 PROCEDURE — 63600175 PHARM REV CODE 636 W HCPCS: Performed by: STUDENT IN AN ORGANIZED HEALTH CARE EDUCATION/TRAINING PROGRAM

## 2022-01-02 PROCEDURE — 25000242 PHARM REV CODE 250 ALT 637 W/ HCPCS: Performed by: EMERGENCY MEDICINE

## 2022-01-02 RX ORDER — IPRATROPIUM BROMIDE AND ALBUTEROL SULFATE 2.5; .5 MG/3ML; MG/3ML
3 SOLUTION RESPIRATORY (INHALATION) ONCE
Status: COMPLETED | OUTPATIENT
Start: 2022-01-02 | End: 2022-01-02

## 2022-01-02 RX ORDER — PREDNISONE 50 MG/1
50 TABLET ORAL DAILY
Qty: 5 TABLET | Refills: 0 | Status: ON HOLD | OUTPATIENT
Start: 2022-01-02 | End: 2022-01-16 | Stop reason: SDUPTHER

## 2022-01-02 RX ORDER — ALBUTEROL SULFATE 90 UG/1
2 AEROSOL, METERED RESPIRATORY (INHALATION) ONCE
Status: COMPLETED | OUTPATIENT
Start: 2022-01-02 | End: 2022-01-02

## 2022-01-02 RX ADMIN — ALBUTEROL SULFATE 2 PUFF: 108 INHALANT RESPIRATORY (INHALATION) at 03:01

## 2022-01-02 RX ADMIN — IPRATROPIUM BROMIDE AND ALBUTEROL SULFATE 3 ML: .5; 3 SOLUTION RESPIRATORY (INHALATION) at 02:01

## 2022-01-02 RX ADMIN — PREDNISONE 60 MG: 20 TABLET ORAL at 12:01

## 2022-01-02 RX ADMIN — IPRATROPIUM BROMIDE AND ALBUTEROL SULFATE 3 ML: .5; 3 SOLUTION RESPIRATORY (INHALATION) at 12:01

## 2022-01-02 NOTE — ED TRIAGE NOTES
Pt presents to ED room 11 short of breath. PT AOx4 and ambulatory. PT reports COPD for the last ten years, the last ten days he reports becoming increasingly SOB with rest and exertion.

## 2022-01-02 NOTE — ED PROVIDER NOTES
Encounter Date: 1/1/2022     History     Chief Complaint   Patient presents with    Shortness of Breath     Pt brought to ED via [facility] from home with c/o SOB that started today, pt recently diagnosed with COPD.      HPI     61-year-old male with recent diagnosis of COPD presents for evaluation of 3 day history of progressive, worsening shortness of breath associated with wheezing and dry cough, which is nonproductive of any sputum.  He denies fevers or chills.  He has tried his home inhalers without improvement.  States that he wants a breathing treatment. He denies chest pain. He denies peripheral edema.     Review of patient's allergies indicates:   Allergen Reactions    Benazepril Swelling    Duoneb [ipratropium-albuterol]      Report minor Tremors, pt seems to be tolerating well.     Past Medical History:   Diagnosis Date    Asthma     COPD (chronic obstructive pulmonary disease)     home O2 at night only    Coronary artery disease     Hypertension     Pneumonia     Seizures      Past Surgical History:   Procedure Laterality Date    LEFT HEART CATHETERIZATION  4/23/2020    Procedure: Left heart cath;  Surgeon: Nic Pollack MD;  Location: [facility] CATH LAB;  Service: Cardiology;;     Family History   Problem Relation Age of Onset    Cancer Father     Hypertension Sister     Stroke Sister     Stroke Brother     Diabetes Neg Hx     Heart disease Neg Hx      Social History     Tobacco Use    Smoking status: Current Some Day Smoker     Packs/day: 0.25     Types: Cigarettes    Smokeless tobacco: Never Used    Tobacco comment: 3-4 per day   Substance Use Topics    Alcohol use: Yes     Alcohol/week: 2.0 standard drinks     Types: 2 Cans of beer per week     Comment: every night    Drug use: No     Review of Systems   Constitutional: Negative for fever.   HENT: Negative for sore throat.    Respiratory: Positive for cough and shortness of breath.    Cardiovascular: Negative for chest pain.    Gastrointestinal: Negative for nausea.   Genitourinary: Negative for dysuria.   Musculoskeletal: Negative for back pain.   Skin: Negative for rash.   Neurological: Negative for weakness.   Hematological: Does not bruise/bleed easily.       Physical Exam     Initial Vitals [01/01/22 2224]   BP Pulse Resp Temp SpO2   (!) 187/98 69 (!) 22 98.4 °F (36.9 °C) 100 %      MAP       --         Physical Exam    Nursing note and vitals reviewed.  Constitutional: He appears well-developed and well-nourished.   HENT:   Head: Normocephalic and atraumatic.   Mouth/Throat: Oropharynx is clear and moist.   Eyes: Conjunctivae and EOM are normal. Pupils are equal, round, and reactive to light.   Neck: No thyromegaly present.   Normal range of motion.  Cardiovascular: Normal rate, regular rhythm and intact distal pulses.   Pulmonary/Chest: No respiratory distress. He has wheezes.   Scant wheezing in bilateral upper lobes, tachypneic, but speaking in full sentences, prolonged expiration   Abdominal: Abdomen is soft. Bowel sounds are normal. He exhibits no distension. There is no abdominal tenderness.   Musculoskeletal:         General: No tenderness or edema. Normal range of motion.      Cervical back: Normal range of motion.     Neurological: He is alert and oriented to person, place, and time. He has normal strength. No cranial nerve deficit.   Skin: Skin is warm and dry. No rash noted.   Psychiatric: He has a normal mood and affect. His behavior is normal. Thought content normal.         ED Course   Procedures  Labs Reviewed   CBC W/ AUTO DIFFERENTIAL - Abnormal; Notable for the following components:       Result Value    Hemoglobin 12.8 (*)     MCV 80 (*)     MCH 24.9 (*)     MCHC 31.0 (*)     RDW 18.5 (*)     All other components within normal limits   COMPREHENSIVE METABOLIC PANEL - Abnormal; Notable for the following components:    BUN 4 (*)     Albumin 3.2 (*)     AST 81 (*)     ALT 73 (*)     All other components within  normal limits   TROPONIN I - Abnormal; Notable for the following components:    Troponin I 0.038 (*)     All other components within normal limits   B-TYPE NATRIURETIC PEPTIDE   SARS-COV-2 RDRP GENE    Narrative:     This test utilizes isothermal nucleic acid amplification   technology to detect the SARS-CoV-2 RdRp nucleic acid segment.   The analytical sensitivity (limit of detection) is 125 genome   equivalents/mL.   A POSITIVE result implies infection with the SARS-CoV-2 virus;   the patient is presumed to be contagious.     A NEGATIVE result means that SARS-CoV-2 nucleic acids are not   present above the limit of detection. A NEGATIVE result should be   treated as presumptive. It does not rule out the possibility of   COVID-19 and should not be the sole basis for treatment decisions.   If COVID-19 is strongly suspected based on clinical and exposure   history, re-testing using an alternate molecular assay should be   considered.   This test is only for use under the Food and Drug   Administration s Emergency Use Authorization (EUA).   Commercial kits are provided by [employer].   Performance characteristics of the EUA have been independently   verified by [facility] Department of   Pathology and Laboratory Medicine.   _________________________________________________________________   The authorized Fact Sheet for Healthcare Providers and the authorized Fact   Sheet for Patients of the ID NOW COVID-19 are available on the FDA   website:     https://www.fda.gov/media/422322/download  https://www.fda.gov/media/574466/download              EKG notable for sinus rhythm, regular rate, HR 72, normal TN, QRS and QTC intervals, normal axis, no acute ST elevations or depressions, interpreted by me.    Imaging Results          X-Ray Chest AP Portable (Final result)  Result time 01/02/22 00:36:12    Final result by José Antonio Snell MD (01/02/22 00:36:12)                 Impression:      Scattered  interstitial opacities.  No focal consolidation.      Electronically signed by: José Antonio Snell MD  Date:    01/02/2022  Time:    00:36             Narrative:    EXAMINATION:  XR CHEST AP PORTABLE    CLINICAL HISTORY:  Shortness of breath    TECHNIQUE:  Single frontal view of the chest was performed.    COMPARISON:  12/21/2021.    FINDINGS:  Monitoring EKG leads are present.  The trachea is unremarkable.  There are calcifications of the aortic knob.  The cardiomediastinal silhouette is within normal limits.  The hemidiaphragms are unremarkable.  There are no pleural effusions.  There is no evidence of a pneumothorax.  There is no evidence of pneumomediastinum.  There are scattered interstitial opacities.  The osseous structures demonstrate degenerative changes.                                 Medications   albuterol-ipratropium 2.5 mg-0.5 mg/3 mL nebulizer solution 3 mL (3 mLs Nebulization Given 1/2/22 0002)   predniSONE tablet 60 mg (60 mg Oral Given 1/2/22 0009)   albuterol-ipratropium 2.5 mg-0.5 mg/3 mL nebulizer solution 3 mL (3 mLs Nebulization Given 1/2/22 0205)   albuterol inhaler 2 puff (2 puffs Inhalation Given 1/2/22 0309)     Medical Decision Making:   Initial Assessment:   Wheezing, decreased air movement, tripodding consistent with COPD exacerbation  Differential Diagnosis:   Pneumonia, COPD exacerbation, COVID, CHF, ACS  Independently Interpreted Test(s):   I have ordered and independently interpreted X-rays - see prior notes.  I have ordered and independently interpreted EKG Reading(s) - see prior notes  Clinical Tests:   Lab Tests: Reviewed  Radiological Study: Reviewed  Medical Tests: Reviewed  ED Management:  Duo-nebs x 2, prednisone, albuterol inhaler with symptom improvement  EKG without acute ischemic changes; troponin elevated but chronically so, lessening concern for ACS  Treated for COPD exacerbation with symptomatic improvement.  Stable for discharge with outpatient pulmonology follow-up,  inhaler refills, steroids and return precautions.              ED Course as of 01/02/22 0520   Sun Jan 02, 2022   0108 Troponin I(!) [BS]   0108 CBC auto differential(!) [BS]   0108 Brain natriuretic peptide [BS]   0108 POCT COVID-19 Rapid Screening [BS]   0108 Comprehensive metabolic panel(!) [BS]      ED Course User Index  [BS] Emmanuel Estrada MD             Clinical Impression:   Final diagnoses:  [R06.02] SOB (shortness of breath)  [R06.02] Shortness of breath  [J44.1] COPD exacerbation (Primary)          ED Disposition Condition    Discharge Stable        ED Prescriptions     Medication Sig Dispense Start Date End Date Auth. Provider    predniSONE (DELTASONE) 50 MG Tab Take 1 tablet (50 mg total) by mouth once daily. 5 tablet 1/2/2022  Emmanuel Estrada MD        Follow-up Information     Follow up With Specialties Details Why Contact Info    Edda Harmon NP Pulmonary Disease Call in 2 days To set up a follow-up appointment [address] [id number] [phone]             Emmanuel Estrada MD  Resident  01/02/22 0834

## 2022-01-02 NOTE — PROVIDER PROGRESS NOTES - EMERGENCY DEPT.
Encounter Date: 1/1/2022    ED Physician Progress Notes         EKG - STEMI Decision  Initial Reading: No STEMI present.  Response: No Action Needed.

## 2022-01-03 ENCOUNTER — PATIENT OUTREACH (OUTPATIENT)
Dept: EMERGENCY MEDICINE | Facility: HOSPITAL | Age: 62
End: 2022-01-03
Payer: MEDICAID

## 2022-01-12 ENCOUNTER — PATIENT OUTREACH (OUTPATIENT)
Dept: EMERGENCY MEDICINE | Facility: HOSPITAL | Age: 62
End: 2022-01-12
Payer: MEDICAID

## 2022-01-15 ENCOUNTER — HOSPITAL ENCOUNTER (OUTPATIENT)
Facility: HOSPITAL | Age: 62
Discharge: HOME | End: 2022-01-16
Attending: EMERGENCY MEDICINE | Admitting: INTERNAL MEDICINE
Payer: MEDICAID

## 2022-01-15 DIAGNOSIS — R06.02 SHORTNESS OF BREATH: ICD-10-CM

## 2022-01-15 DIAGNOSIS — J44.1 COPD EXACERBATION: Primary | ICD-10-CM

## 2022-01-15 DIAGNOSIS — J44.9 CHRONIC OBSTRUCTIVE PULMONARY DISEASE, UNSPECIFIED COPD TYPE: ICD-10-CM

## 2022-01-15 DIAGNOSIS — R07.9 CHEST PAIN: ICD-10-CM

## 2022-01-15 LAB
ADENOVIRUS: NOT DETECTED
ALBUMIN SERPL BCP-MCNC: 3.1 G/DL (ref 3.5–5.2)
ALLENS TEST: ABNORMAL
ALP SERPL-CCNC: 87 U/L (ref 55–135)
ALT SERPL W/O P-5'-P-CCNC: 87 U/L (ref 10–44)
ANION GAP SERPL CALC-SCNC: 10 MMOL/L (ref 8–16)
AST SERPL-CCNC: 70 U/L (ref 10–40)
BASOPHILS # BLD AUTO: 0.06 K/UL (ref 0–0.2)
BASOPHILS NFR BLD: 0.8 % (ref 0–1.9)
BILIRUB SERPL-MCNC: 0.6 MG/DL (ref 0.1–1)
BNP SERPL-MCNC: 41 PG/ML (ref 0–99)
BORDETELLA PARAPERTUSSIS (IS1001): NOT DETECTED
BORDETELLA PERTUSSIS (PTXP): NOT DETECTED
BUN SERPL-MCNC: 10 MG/DL (ref 6–20)
CALCIUM SERPL-MCNC: 9.2 MG/DL (ref 8.7–10.5)
CHLAMYDIA PNEUMONIAE: NOT DETECTED
CHLORIDE SERPL-SCNC: 98 MMOL/L (ref 95–110)
CO2 SERPL-SCNC: 30 MMOL/L (ref 23–29)
CORONAVIRUS 229E, COMMON COLD VIRUS: NOT DETECTED
CORONAVIRUS HKU1, COMMON COLD VIRUS: NOT DETECTED
CORONAVIRUS NL63, COMMON COLD VIRUS: NOT DETECTED
CORONAVIRUS OC43, COMMON COLD VIRUS: NOT DETECTED
CREAT SERPL-MCNC: 0.8 MG/DL (ref 0.5–1.4)
CTP QC/QA: YES
CTP QC/QA: YES
DIFFERENTIAL METHOD: ABNORMAL
EOSINOPHIL # BLD AUTO: 0.2 K/UL (ref 0–0.5)
EOSINOPHIL NFR BLD: 3.3 % (ref 0–8)
ERYTHROCYTE [DISTWIDTH] IN BLOOD BY AUTOMATED COUNT: 19.6 % (ref 11.5–14.5)
EST. GFR  (AFRICAN AMERICAN): >60 ML/MIN/1.73 M^2
EST. GFR  (NON AFRICAN AMERICAN): >60 ML/MIN/1.73 M^2
FLUBV RNA NPH QL NAA+NON-PROBE: NOT DETECTED
GLUCOSE SERPL-MCNC: 104 MG/DL (ref 70–110)
HCO3 UR-SCNC: 40.1 MMOL/L (ref 24–28)
HCT VFR BLD AUTO: 39.9 % (ref 40–54)
HGB BLD-MCNC: 12 G/DL (ref 14–18)
HPIV1 RNA NPH QL NAA+NON-PROBE: NOT DETECTED
HPIV2 RNA NPH QL NAA+NON-PROBE: NOT DETECTED
HPIV3 RNA NPH QL NAA+NON-PROBE: NOT DETECTED
HPIV4 RNA NPH QL NAA+NON-PROBE: NOT DETECTED
HUMAN METAPNEUMOVIRUS: NOT DETECTED
IMM GRANULOCYTES # BLD AUTO: 0.03 K/UL (ref 0–0.04)
IMM GRANULOCYTES NFR BLD AUTO: 0.4 % (ref 0–0.5)
INFLUENZA A (SUBTYPES H1,H1-2009,H3): NOT DETECTED
LYMPHOCYTES # BLD AUTO: 1.7 K/UL (ref 1–4.8)
LYMPHOCYTES NFR BLD: 22.8 % (ref 18–48)
MCH RBC QN AUTO: 25.2 PG (ref 27–31)
MCHC RBC AUTO-ENTMCNC: 30.1 G/DL (ref 32–36)
MCV RBC AUTO: 84 FL (ref 82–98)
MONOCYTES # BLD AUTO: 1.2 K/UL (ref 0.3–1)
MONOCYTES NFR BLD: 15.8 % (ref 4–15)
MYCOPLASMA PNEUMONIAE: NOT DETECTED
NEUTROPHILS # BLD AUTO: 4.2 K/UL (ref 1.8–7.7)
NEUTROPHILS NFR BLD: 56.9 % (ref 38–73)
NRBC BLD-RTO: 0 /100 WBC
PCO2 BLDA: 71.5 MMHG (ref 35–45)
PH SMN: 7.36 [PH] (ref 7.35–7.45)
PLATELET # BLD AUTO: 229 K/UL (ref 150–450)
PMV BLD AUTO: 10.5 FL (ref 9.2–12.9)
PO2 BLDA: 33 MMHG (ref 40–60)
POC BE: 15 MMOL/L
POC MOLECULAR INFLUENZA A AGN: NEGATIVE
POC MOLECULAR INFLUENZA B AGN: NEGATIVE
POC SATURATED O2: 57 % (ref 95–100)
POC TCO2: 42 MMOL/L (ref 24–29)
POTASSIUM SERPL-SCNC: 4.3 MMOL/L (ref 3.5–5.1)
PROT SERPL-MCNC: 7 G/DL (ref 6–8.4)
RBC # BLD AUTO: 4.77 M/UL (ref 4.6–6.2)
RESPIRATORY INFECTION PANEL SOURCE: NORMAL
RSV RNA NPH QL NAA+NON-PROBE: NOT DETECTED
RV+EV RNA NPH QL NAA+NON-PROBE: NOT DETECTED
SAMPLE: ABNORMAL
SARS-COV-2 RDRP RESP QL NAA+PROBE: NEGATIVE
SITE: ABNORMAL
SODIUM SERPL-SCNC: 138 MMOL/L (ref 136–145)
TROPONIN I SERPL DL<=0.01 NG/ML-MCNC: 0.02 NG/ML (ref 0–0.03)
WBC # BLD AUTO: 7.34 K/UL (ref 3.9–12.7)

## 2022-01-15 PROCEDURE — S4991 NICOTINE PATCH NONLEGEND: HCPCS | Performed by: INTERNAL MEDICINE

## 2022-01-15 PROCEDURE — 84484 ASSAY OF TROPONIN QUANT: CPT | Performed by: STUDENT IN AN ORGANIZED HEALTH CARE EDUCATION/TRAINING PROGRAM

## 2022-01-15 PROCEDURE — 25000003 PHARM REV CODE 250: Performed by: INTERNAL MEDICINE

## 2022-01-15 PROCEDURE — 63600175 PHARM REV CODE 636 W HCPCS: Performed by: STUDENT IN AN ORGANIZED HEALTH CARE EDUCATION/TRAINING PROGRAM

## 2022-01-15 PROCEDURE — 87633 RESP VIRUS 12-25 TARGETS: CPT | Performed by: STUDENT IN AN ORGANIZED HEALTH CARE EDUCATION/TRAINING PROGRAM

## 2022-01-15 PROCEDURE — 96372 THER/PROPH/DIAG INJ SC/IM: CPT | Mod: 59 | Performed by: EMERGENCY MEDICINE

## 2022-01-15 PROCEDURE — 27000221 HC OXYGEN, UP TO 24 HOURS

## 2022-01-15 PROCEDURE — 94640 AIRWAY INHALATION TREATMENT: CPT

## 2022-01-15 PROCEDURE — 99220 PR INITIAL OBSERVATION CARE,LEVL III: CPT | Mod: ,,, | Performed by: INTERNAL MEDICINE

## 2022-01-15 PROCEDURE — 87633 RESP VIRUS 12-25 TARGETS: CPT | Mod: 91 | Performed by: INTERNAL MEDICINE

## 2022-01-15 PROCEDURE — 87502 INFLUENZA DNA AMP PROBE: CPT | Mod: 59

## 2022-01-15 PROCEDURE — 87798 DETECT AGENT NOS DNA AMP: CPT | Mod: 59 | Performed by: STUDENT IN AN ORGANIZED HEALTH CARE EDUCATION/TRAINING PROGRAM

## 2022-01-15 PROCEDURE — 96365 THER/PROPH/DIAG IV INF INIT: CPT

## 2022-01-15 PROCEDURE — 94761 N-INVAS EAR/PLS OXIMETRY MLT: CPT

## 2022-01-15 PROCEDURE — G0378 HOSPITAL OBSERVATION PER HR: HCPCS

## 2022-01-15 PROCEDURE — 25000242 PHARM REV CODE 250 ALT 637 W/ HCPCS: Performed by: STUDENT IN AN ORGANIZED HEALTH CARE EDUCATION/TRAINING PROGRAM

## 2022-01-15 PROCEDURE — 63600175 PHARM REV CODE 636 W HCPCS: Performed by: INTERNAL MEDICINE

## 2022-01-15 PROCEDURE — 99900035 HC TECH TIME PER 15 MIN (STAT)

## 2022-01-15 PROCEDURE — 82803 BLOOD GASES ANY COMBINATION: CPT

## 2022-01-15 PROCEDURE — 87798 DETECT AGENT NOS DNA AMP: CPT | Mod: 59 | Performed by: INTERNAL MEDICINE

## 2022-01-15 PROCEDURE — 96375 TX/PRO/DX INJ NEW DRUG ADDON: CPT

## 2022-01-15 PROCEDURE — 99284 EMERGENCY DEPT VISIT MOD MDM: CPT | Mod: ,,, | Performed by: EMERGENCY MEDICINE

## 2022-01-15 PROCEDURE — 85025 COMPLETE CBC W/AUTO DIFF WBC: CPT | Performed by: STUDENT IN AN ORGANIZED HEALTH CARE EDUCATION/TRAINING PROGRAM

## 2022-01-15 PROCEDURE — 99284 PR EMERGENCY DEPT VISIT,LEVEL IV: ICD-10-PCS | Mod: ,,, | Performed by: EMERGENCY MEDICINE

## 2022-01-15 PROCEDURE — U0002 COVID-19 LAB TEST NON-CDC: HCPCS | Performed by: EMERGENCY MEDICINE

## 2022-01-15 PROCEDURE — 25000242 PHARM REV CODE 250 ALT 637 W/ HCPCS: Performed by: INTERNAL MEDICINE

## 2022-01-15 PROCEDURE — 80053 COMPREHEN METABOLIC PANEL: CPT | Performed by: STUDENT IN AN ORGANIZED HEALTH CARE EDUCATION/TRAINING PROGRAM

## 2022-01-15 PROCEDURE — 99285 EMERGENCY DEPT VISIT HI MDM: CPT | Mod: 25

## 2022-01-15 PROCEDURE — 99220 PR INITIAL OBSERVATION CARE,LEVL III: ICD-10-PCS | Mod: ,,, | Performed by: INTERNAL MEDICINE

## 2022-01-15 PROCEDURE — 83880 ASSAY OF NATRIURETIC PEPTIDE: CPT | Performed by: EMERGENCY MEDICINE

## 2022-01-15 RX ORDER — ONDANSETRON 2 MG/ML
4 INJECTION INTRAMUSCULAR; INTRAVENOUS EVERY 8 HOURS PRN
Status: DISCONTINUED | OUTPATIENT
Start: 2022-01-15 | End: 2022-01-16 | Stop reason: HOSPADM

## 2022-01-15 RX ORDER — GLUCAGON 1 MG
1 KIT INJECTION
Status: DISCONTINUED | OUTPATIENT
Start: 2022-01-15 | End: 2022-01-16 | Stop reason: HOSPADM

## 2022-01-15 RX ORDER — IPRATROPIUM BROMIDE AND ALBUTEROL SULFATE 2.5; .5 MG/3ML; MG/3ML
3 SOLUTION RESPIRATORY (INHALATION)
Status: COMPLETED | OUTPATIENT
Start: 2022-01-15 | End: 2022-01-15

## 2022-01-15 RX ORDER — PREDNISONE 20 MG/1
40 TABLET ORAL DAILY
Status: DISCONTINUED | OUTPATIENT
Start: 2022-01-16 | End: 2022-01-16 | Stop reason: HOSPADM

## 2022-01-15 RX ORDER — AMLODIPINE BESYLATE 10 MG/1
10 TABLET ORAL DAILY
Status: DISCONTINUED | OUTPATIENT
Start: 2022-01-15 | End: 2022-01-16 | Stop reason: HOSPADM

## 2022-01-15 RX ORDER — IBUPROFEN 200 MG
1 TABLET ORAL DAILY
Status: DISCONTINUED | OUTPATIENT
Start: 2022-01-15 | End: 2022-01-16 | Stop reason: HOSPADM

## 2022-01-15 RX ORDER — ASPIRIN 81 MG/1
81 TABLET ORAL DAILY
Status: DISCONTINUED | OUTPATIENT
Start: 2022-01-15 | End: 2022-01-16 | Stop reason: HOSPADM

## 2022-01-15 RX ORDER — IBUPROFEN 200 MG
24 TABLET ORAL
Status: DISCONTINUED | OUTPATIENT
Start: 2022-01-15 | End: 2022-01-16 | Stop reason: HOSPADM

## 2022-01-15 RX ORDER — IBUPROFEN 200 MG
16 TABLET ORAL
Status: DISCONTINUED | OUTPATIENT
Start: 2022-01-15 | End: 2022-01-16 | Stop reason: HOSPADM

## 2022-01-15 RX ORDER — METHYLPREDNISOLONE SOD SUCC 125 MG
125 VIAL (EA) INJECTION
Status: COMPLETED | OUTPATIENT
Start: 2022-01-15 | End: 2022-01-15

## 2022-01-15 RX ORDER — ENOXAPARIN SODIUM 100 MG/ML
40 INJECTION SUBCUTANEOUS EVERY 24 HOURS
Status: DISCONTINUED | OUTPATIENT
Start: 2022-01-15 | End: 2022-01-16 | Stop reason: HOSPADM

## 2022-01-15 RX ORDER — ATORVASTATIN CALCIUM 20 MG/1
40 TABLET, FILM COATED ORAL DAILY
Status: DISCONTINUED | OUTPATIENT
Start: 2022-01-15 | End: 2022-01-16 | Stop reason: HOSPADM

## 2022-01-15 RX ORDER — AZITHROMYCIN 250 MG/1
250 TABLET, FILM COATED ORAL DAILY
Status: DISCONTINUED | OUTPATIENT
Start: 2022-01-15 | End: 2022-01-15

## 2022-01-15 RX ORDER — SODIUM CHLORIDE 0.9 % (FLUSH) 0.9 %
10 SYRINGE (ML) INJECTION EVERY 12 HOURS PRN
Status: DISCONTINUED | OUTPATIENT
Start: 2022-01-15 | End: 2022-01-16 | Stop reason: HOSPADM

## 2022-01-15 RX ORDER — NALOXONE HCL 0.4 MG/ML
0.02 VIAL (ML) INJECTION
Status: DISCONTINUED | OUTPATIENT
Start: 2022-01-15 | End: 2022-01-16 | Stop reason: HOSPADM

## 2022-01-15 RX ORDER — IPRATROPIUM BROMIDE AND ALBUTEROL SULFATE 2.5; .5 MG/3ML; MG/3ML
3 SOLUTION RESPIRATORY (INHALATION) EVERY 4 HOURS
Status: COMPLETED | OUTPATIENT
Start: 2022-01-15 | End: 2022-01-15

## 2022-01-15 RX ADMIN — NICOTINE 1 PATCH: 14 PATCH TRANSDERMAL at 05:01

## 2022-01-15 RX ADMIN — AMLODIPINE BESYLATE 10 MG: 10 TABLET ORAL at 02:01

## 2022-01-15 RX ADMIN — ENOXAPARIN SODIUM 40 MG: 100 INJECTION SUBCUTANEOUS at 05:01

## 2022-01-15 RX ADMIN — IPRATROPIUM BROMIDE AND ALBUTEROL SULFATE 3 ML: 2.5; .5 SOLUTION RESPIRATORY (INHALATION) at 01:01

## 2022-01-15 RX ADMIN — IPRATROPIUM BROMIDE AND ALBUTEROL SULFATE 3 ML: 2.5; .5 SOLUTION RESPIRATORY (INHALATION) at 04:01

## 2022-01-15 RX ADMIN — ASPIRIN 81 MG: 81 TABLET, COATED ORAL at 02:01

## 2022-01-15 RX ADMIN — IPRATROPIUM BROMIDE AND ALBUTEROL SULFATE 3 ML: 2.5; .5 SOLUTION RESPIRATORY (INHALATION) at 09:01

## 2022-01-15 RX ADMIN — CEFTRIAXONE 1 G: 1 INJECTION, SOLUTION INTRAVENOUS at 10:01

## 2022-01-15 RX ADMIN — METHYLPREDNISOLONE SODIUM SUCCINATE 125 MG: 125 INJECTION, POWDER, FOR SOLUTION INTRAMUSCULAR; INTRAVENOUS at 10:01

## 2022-01-15 RX ADMIN — ATORVASTATIN CALCIUM 40 MG: 40 TABLET, FILM COATED ORAL at 02:01

## 2022-01-15 NOTE — ED NOTES
Pt to ED w increasing sob over the last month.  He has a hx of copd and he feels in is worsening.  He wears O2 @ 4L at home and takes and inhaler.    LOC: Patient name and date of birth verified. The patient is awake, alert and aware of environment with an appropriate affect, the patient is oriented x 3 and speaking appropriately.   APPEARANCE: Patient resting comfortably, patient is clean and well groomed, patient's clothing is properly fastened.  SKIN: The skin is warm and dry, color consistent with ethnicity, patient has normal skin turgor and moist mucus membranes, skin intact, no breakdown or bruising noted.  MUSCULOSKELETAL: Patient moving all extremities well, no obvious swelling or deformities noted.   RESPIRATORY: Respirations are spontaneous, patient has a normal effort and rate, no accessory muscle use noted.  Pt appears dyspneic on exertion.  CARDIAC: Patient has a normal rate and rhythm, no periphreal edema noted, capillary refill < 3 seconds.  ABDOMEN: Soft and non tender to palpation, no distention noted. Bowel sounds present in all four quadrants.  NEUROLOGIC: Eyes open spontaneously, behavior appropriate to situation, follows commands, facial expression symmetrical, bilateral hand grasp equal and even, purposeful motor response noted, normal sensation in all extremities when touched with a finger.

## 2022-01-15 NOTE — ED PROVIDER NOTES
Encounter Date: 1/15/2022       History     Chief Complaint   Patient presents with    Shortness of Breath     Pt presents from home with c/o shortness of breath x 2 wks. He reports he was supposed to get a nebulizer machine at home and do treatments but has not gotten it yet. Pt wears home oxygen at 2 L nc and sometimes increases it to 3 L. Pt denies pain. GCS 15.      61-year-old male with CAD, COPD, HTN, history of pneumonia, seizure disorder presents for shortness of breath for the past 1-2 weeks. Shortness of breath is with exertion, worse and is severe enough that he has trouble walking rest room.  Patient was supposed to be on a nebulized treatment home however usually has not been able to obtain a refill since last hospitalization. Patient has a productive cough that is producing brown sputum.  Patient is normally on 2 L nasal cannula at home, however he has been needing up to 4 L more frequently. Patient has no known sick contacts.  Patient denies any chest pain, leg swelling, history of blood clots, fever/chills        Review of patient's allergies indicates:   Allergen Reactions    Benazepril Swelling    Duoneb [ipratropium-albuterol]      Report minor Tremors, pt seems to be tolerating well.     Past Medical History:   Diagnosis Date    Asthma     COPD (chronic obstructive pulmonary disease)     home O2 at night only    Coronary artery disease     Hypertension     Pneumonia     Seizures      Past Surgical History:   Procedure Laterality Date    LEFT HEART CATHETERIZATION  4/23/2020    Procedure: Left heart cath;  Surgeon: Nic Pollack MD;  Location: [facility] CATH LAB;  Service: Cardiology;;     Family History   Problem Relation Age of Onset    Cancer Father     Hypertension Sister     Stroke Sister     Stroke Brother     Diabetes Neg Hx     Heart disease Neg Hx      Social History     Tobacco Use    Smoking status: Current Some Day Smoker     Packs/day: 0.25     Types: Cigarettes     Smokeless tobacco: Never Used    Tobacco comment: 3-4 per day   Substance Use Topics    Alcohol use: Yes     Alcohol/week: 2.0 standard drinks     Types: 2 Cans of beer per week     Comment: every night    Drug use: No     Review of Systems   Constitutional: Negative for fatigue and fever.   HENT: Negative for rhinorrhea and sore throat.    Eyes: Negative for discharge and redness.   Respiratory: Positive for cough and shortness of breath.    Cardiovascular: Negative for chest pain and palpitations.   Gastrointestinal: Negative for diarrhea, nausea and vomiting.   Endocrine: Negative for cold intolerance and heat intolerance.   Genitourinary: Negative for dysuria and frequency.   Musculoskeletal: Negative for myalgias and neck stiffness.   Skin: Negative for pallor and rash.   Neurological: Negative for dizziness and headaches.   Psychiatric/Behavioral: Negative for agitation and confusion.       Physical Exam     Initial Vitals [01/15/22 0855]   BP Pulse Resp Temp SpO2   (!) 170/96 98 (!) 22 99.1 °F (37.3 °C) 100 %      MAP       --         Physical Exam    Nursing note and vitals reviewed.  Constitutional:   Alert, speaking full sentences, mildly increased work of breathing   HENT:   Head: Normocephalic and atraumatic.   Mouth/Throat: Oropharynx is clear and moist.   Eyes: Conjunctivae are normal. No scleral icterus.   Neck: Neck supple.   Cardiovascular: Normal rate, regular rhythm, normal heart sounds and intact distal pulses.   Pulmonary/Chest: No stridor.   Mild tachypnea, diminished breath sounds bilaterally, subtle expiratory wheezing, no accessory muscle use   Abdominal: Abdomen is soft. He exhibits no distension. There is no abdominal tenderness.   Musculoskeletal:         General: No tenderness or edema.      Cervical back: Neck supple.     Neurological: He is alert and oriented to person, place, and time.   Skin: Skin is warm and dry. Capillary refill takes less than 2 seconds. No rash noted.    Psychiatric: He has a normal mood and affect. Thought content normal.         ED Course   Procedures  Labs Reviewed   CBC W/ AUTO DIFFERENTIAL - Abnormal; Notable for the following components:       Result Value    Hemoglobin 12.0 (*)     Hematocrit 39.9 (*)     MCH 25.2 (*)     MCHC 30.1 (*)     RDW 19.6 (*)     Mono # 1.2 (*)     Mono % 15.8 (*)     All other components within normal limits   COMPREHENSIVE METABOLIC PANEL - Abnormal; Notable for the following components:    CO2 30 (*)     Albumin 3.1 (*)     AST 70 (*)     ALT 87 (*)     All other components within normal limits   ISTAT PROCEDURE - Abnormal; Notable for the following components:    POC PCO2 71.5 (*)     POC PO2 33 (*)     POC HCO3 40.1 (*)     POC SATURATED O2 57 (*)     POC TCO2 42 (*)     All other components within normal limits   RESPIRATORY INFECTION PANEL (PCR), NASOPHARYNGEAL    Narrative:     For all other respiratory sources, order [id number] -  Respiratory Viral Panel by PCR   RESPIRATORY INFECTION PANEL (PCR), NASOPHARYNGEAL    Narrative:     For all other respiratory sources, order [id number] -  Respiratory Viral Panel by PCR   TROPONIN I   B-TYPE NATRIURETIC PEPTIDE   SARS-COV-2 RDRP GENE    Narrative:     This test utilizes isothermal nucleic acid amplification   technology to detect the SARS-CoV-2 RdRp nucleic acid segment.   The analytical sensitivity (limit of detection) is 125 genome   equivalents/mL.   A POSITIVE result implies infection with the SARS-CoV-2 virus;   the patient is presumed to be contagious.     A NEGATIVE result means that SARS-CoV-2 nucleic acids are not   present above the limit of detection. A NEGATIVE result should be   treated as presumptive. It does not rule out the possibility of   COVID-19 and should not be the sole basis for treatment decisions.   If COVID-19 is strongly suspected based on clinical and exposure   history, re-testing using an alternate molecular assay should be   considered.   This test is  only for use under the Food and Drug   Administration s Emergency Use Authorization (EUA).   Commercial kits are provided by [employer].   Performance characteristics of the EUA have been independently   verified by [facility] Department of   Pathology and Laboratory Medicine.   _________________________________________________________________   The authorized Fact Sheet for Healthcare Providers and the authorized Fact   Sheet for Patients of the ID NOW COVID-19 are available on the FDA   website:     https://www.fda.gov/media/128450/download  https://www.fda.gov/media/486964/download       POCT INFLUENZA A/B MOLECULAR     EKG Readings: (Independently Interpreted)   Sinus rhythm, regular, narrow, rate of 72, normal axis, positive LVH, T-wave inversions in aVL, unchanged compared to previous       Imaging Results          X-Ray Chest AP Portable (Final result)  Result time 01/15/22 10:22:40    Final result by Franklin Hsieh DO (01/15/22 10:22:40)                 Impression:      No acute abnormality.      Electronically signed by: Franklin Hsieh  Date:    01/15/2022  Time:    10:22             Narrative:    EXAMINATION:  XR CHEST AP PORTABLE    CLINICAL HISTORY:  Shortness of breath    TECHNIQUE:  Single frontal view of the chest was performed.    COMPARISON:  01/02/2022.    FINDINGS:  The lungs are well expanded.  There are mild linear opacities in the right mid lung, likely atelectasis or scarring.  The lungs are otherwise clear without large focal consolidation.  The pleural spaces are clear.    The cardiac silhouette is unremarkable.  There are atherosclerotic calcifications of the aortic arch.    The visualized osseous structures are unremarkable.                                 Medications   amLODIPine tablet 10 mg (10 mg Oral Given 1/15/22 1411)   aspirin EC tablet 81 mg (81 mg Oral Given 1/15/22 1411)   atorvastatin tablet 40 mg (40 mg Oral Given 1/15/22 1411)   sodium chloride 0.9%  flush 10 mL (has no administration in time range)   glucose chewable tablet 16 g (has no administration in time range)   glucose chewable tablet 24 g (has no administration in time range)   dextrose 50% injection 12.5 g (has no administration in time range)   dextrose 50% injection 25 g (has no administration in time range)   glucagon (human recombinant) injection 1 mg (has no administration in time range)   naloxone 0.4 mg/mL injection 0.02 mg (has no administration in time range)   enoxaparin injection 40 mg (has no administration in time range)   ondansetron injection 4 mg (has no administration in time range)   albuterol-ipratropium 2.5 mg-0.5 mg/3 mL nebulizer solution 3 mL (3 mLs Nebulization Given 1/15/22 1338)   predniSONE tablet 40 mg (has no administration in time range)   albuterol-ipratropium 2.5 mg-0.5 mg/3 mL nebulizer solution 3 mL (3 mLs Nebulization Given 1/15/22 0957)   methylPREDNISolone sodium succinate injection 125 mg (125 mg Intravenous Given 1/15/22 1010)   cefTRIAXone (ROCEPHIN) 1 g/50 mL D5W IVPB (0 g Intravenous Stopped 1/15/22 1056)     Medical Decision Making:   History:   Old Medical Records: I decided to obtain old medical records.  Old Records Summarized: records from previous admission(s).  Initial Assessment:   61-year-old male with CAD, COPD, HTN, history of pneumonia, seizure disorder presents for worsening shortness of breath for the past 1-2 weeks  Independently Interpreted Test(s):   I have ordered and independently interpreted EKG Reading(s) - see prior notes  Clinical Tests:   Lab Tests: Ordered and Reviewed  Radiological Study: Ordered and Reviewed  Medical Tests: Ordered and Reviewed  ED Management:  Patient presenting with shortness of breath with SpO2 98% on 2L initially, however dropped to 88% on 2 L   Lung exam with subtle and expiratory wheezes, diminished breath sounds bilaterally, mild tachypnea  Presentation most consistent with COPD exacerbation, patient with clear  increased oxygen and home treatment requirements  Differentials also include ACS, pneumonia, asthma, symptomatic anemia, doubt PE/DVT or pericardial effusion/tamponade  No unilateral leg swelling, no known risk factors or triggering events, inconsistent with PE  No history of heart failure, no peripheral edema, will evaluate further with BMP chest x-ray  Patient nontoxic, hemodynamically stable, afebrile, inconsistent with pneumonia  EKG without ischemic findings  DuoNebs, Solu-Medrol ordered for symptoms  Rocephin ordered for COPD exacerbation  Patient was admitted to hospital medicine for further management               Attending Attestation:   Physician Attestation Statement for Resident:  As the supervising MD   Physician Attestation Statement: I have personally seen and examined this patient.   I agree with the above history. -:   As the supervising MD I agree with the above PE.    As the supervising MD I agree with the above treatment, course, plan, and disposition.   -: 61-year-old male past medical history of COPD, CAD, hypertension presents to the emergency department for worsening shortness of breath for the last month.  He reports in the last month has progressively gotten worse up to the point that he can walk about 4 steps at home, he is at baseline on 2 L oxygen however in the last week he had to increase the oxygen up to 4-5 L of home.  Reports dry cough not worsening, no fever, no congestion, no chest pain, no lower extremity edema  Has been compliant with his medications for COPD.  He was told he is going to go on a machine at night however he never got the machine    On exam currently on DuoNebs, bilateral crackles, no lower extremity edema, becomes short of breath with moving in bed.  Discussed with respiratory tech, he was 88% on 2 L before he was started on the DuoNebs.    DDX:  COPD exacerbation versus CHF versus ACS less likely PE the shortness of breath has been for a month with no chest  pain    Chart review with recent admission in December 2021 for shortness of breath secondary to COPD exacerbation that required BiPAP    Plan for CXR, labs including BNP. Trop, VBG. Duonebs, steroids                  ED Course as of 01/15/22 1457   Sat Darin 15, 2022   1001 SpO2(!): 88 %  On 2L [OK]   1001 SARS-CoV-2 RNA, Amplification, Qual: Negative [OK]   1138 BNP: 41 [OK]      ED Course User Index  [OK] Anjum Garcia MD             Clinical Impression:   Final diagnoses:  [R06.02] Shortness of breath  [J44.1] COPD exacerbation (Primary)          ED Disposition Condition    Observation               Anjum Garcia MD  Resident  01/15/22 1451       Rosemarie Pérez MD  01/15/22 5365

## 2022-01-15 NOTE — H&P
HISTORY & PHYSICAL  Hospital Medicine    Team: [facility]    PRESENTING HISTORY     Chief Complaint/Reason for Admission:  Shortness of breath / COPD exacerbation    History of Present Illness:  Mr. Baltazar Mccray is a 61 y.o. male with COPD with chronic respiratory failure (on 2L home O2), tobacco use, CAD, hx seizures, hx crack/cocaine use (20yrs ago) who presented with shortness of breath.    Patient presents with progressive shortness of breath and running out of COPD medications. He was recently in ED on 1/1/22 for COPD exacerbation, discharged after nebs x2 and on baseline home O2 on course of prednisone. He was admitted recently end of Dec 2021 for COPD exacerbation when he required brief course of BiPAP. He did well for about a week after recent ED presentation, but then had progressive shortness of breath again. At baseline, he is on 2L NC and able to ambulate around his home. However, now unable to walk a few steps, has required 3-4L NC. He has been using albuterol inhaler every 3-4 hrs including in the middle of the night. He does not recall his other inhalers, but knows he is on at least two and reports compliance. He ran out of albuterol and other inhalers last night. Otherwise, denies fevers, chills, productive cough, chest pain, sick contacts (lives alone). He received COVID vaccine including booster, but has not received Influenza vaccine. He continues to smoke 1/2 ppd with last cigarette about a week ago.    Here, afebrile, , BP elevated, 100% on 2L NC.  WBC normal.  COVID negative  CXR unremarkable.  Last TTE 5/2021 with normal EF and diastolic function.    Review of Systems:  10 point ROS negative except as noted in HPI  He does note vomiting occasionally after eating.    PAST HISTORY:     Past Medical History:   Diagnosis Date    Asthma     COPD (chronic obstructive pulmonary disease)     home O2 at night only    Coronary artery disease     Hypertension     Pneumonia     Seizures         Past Surgical History:   Procedure Laterality Date    LEFT HEART CATHETERIZATION  4/23/2020    Procedure: Left heart cath;  Surgeon: Nic Pollack MD;  Location: [facility] CATH LAB;  Service: Cardiology;;       Family History   Problem Relation Age of Onset    Cancer Father     Hypertension Sister     Stroke Sister     Stroke Brother     Diabetes Neg Hx     Heart disease Neg Hx        Social History     Socioeconomic History    Marital status: Single   Tobacco Use    Smoking status: Current Some Day Smoker     Packs/day: 0.25     Types: Cigarettes    Smokeless tobacco: Never Used    Tobacco comment: 3-4 per day   Substance and Sexual Activity    Alcohol use: Yes     Alcohol/week: 2.0 standard drinks     Types: 2 Cans of beer per week     Comment: every night    Drug use: No    Sexual activity: Not Currently   Social History Narrative    Patient' nephew is currently living with him in his apartment. Patient's sister Viry ([phone]) helps manage patient's care.            6/3/21    Patient is no longer staying with family. Patient is currently staying at the [facility] and working on getting into their program for more supportive housing.      Social Determinants of Health     Financial Resource Strain: Low Risk     Difficulty of Paying Living Expenses: Not very hard   Food Insecurity: No Food Insecurity    Worried About Running Out of Food in the Last Year: Never true    Ran Out of Food in the Last Year: Never true   Transportation Needs: No Transportation Needs    Lack of Transportation (Medical): No    Lack of Transportation (Non-Medical): No   Physical Activity: Inactive    Days of Exercise per Week: 0 days    Minutes of Exercise per Session: 0 min   Stress: No Stress Concern Present    Feeling of Stress : Only a little   Social Connections: Unknown    Frequency of Communication with Friends and Family: More than three times a week    Frequency of Social Gatherings with Friends  and Family: More than three times a week    Attends Taoism Services: Never    Active Member of Clubs or Organizations: No    Attends Club or Organization Meetings: Never   Housing Stability: Unknown    Unable to Pay for Housing in the Last Year: No    Unstable Housing in the Last Year: No       MEDICATIONS & ALLERGIES:     No current facility-administered medications on file prior to encounter.     Current Outpatient Medications on File Prior to Encounter   Medication Sig Dispense Refill    acetaminophen (TYLENOL) 325 MG tablet Take 2 tablets (650 mg total) by mouth every 8 (eight) hours as needed for Pain or Temperature greater than (100.5).  0    albuterol (PROVENTIL/VENTOLIN HFA) 90 mcg/actuation inhaler Inhale 1-2 puffs into the lungs every 4 (four) hours as needed for Wheezing or Shortness of Breath. Rescue 8.5 g 1    albuterol-ipratropium (DUO-NEB) 2.5 mg-0.5 mg/3 mL nebulizer solution Take 3 mLs by nebulization every 4 (four) hours as needed for Wheezing or Shortness of Breath. Rescue 180 mL 3    amLODIPine (NORVASC) 10 MG tablet Take 1 tablet (10 mg total) by mouth once daily.      aspirin (ECOTRIN) 81 MG EC tablet Take 1 tablet (81 mg total) by mouth once daily. 90 tablet 3    atorvastatin (LIPITOR) 40 MG tablet Take 1 tablet (40 mg total) by mouth once daily. 30 tablet 11    budesonide-glycopyr-formoterol (BREZTRI AEROSPHERE) 160-9-4.8 mcg/actuation HFAA Inhale 2 puffs into the lungs 2 (two) times daily. Rinse mouth after use. 10.7 g 11    folic acid (FOLVITE) 1 MG tablet Take 1 tablet (1 mg total) by mouth once daily. 90 tablet 0    ipratropium-albuteroL (COMBIVENT)  mcg/actuation inhaler Inhale 2 puffs into the lungs every 4 (four) hours as needed for Wheezing or Shortness of Breath. Rescue 4 g 3    multivitamin Tab Take 1 tablet by mouth once daily. 90 tablet 0    nicotine (NICODERM CQ) 7 mg/24 hr Place 1 patch onto the skin once daily. 90 patch 0    predniSONE (DELTASONE) 50  MG Tab Take 1 tablet (50 mg total) by mouth once daily. 5 tablet 0    pulse oximeter (PULSE OXIMETER) device by Apply Externally route 2 (two) times a day. Use twice daily at 8 AM and 3 PM and [facility] as directed. 1 each 0    senna (SENOKOT) 8.6 mg tablet Take 1 tablet by mouth daily as needed for Constipation.      thiamine 100 MG tablet Take 1 tablet (100 mg total) by mouth once daily. 90 tablet 0        Review of patient's allergies indicates:   Allergen Reactions    Benazepril Swelling    Duoneb [ipratropium-albuterol]      Report minor Tremors, pt seems to be tolerating well.       OBJECTIVE:     Vital Signs:  Temp:  [99.1 °F (37.3 °C)] 99.1 °F (37.3 °C)  Pulse:  [] 100  Resp:  [18-25] 25  SpO2:  [88 %-100 %] 100 %  BP: (170)/(96) 170/96  Body mass index is 21.29 kg/m².     Physical Exam:  General: Alert and Oriented, no distress but respiratory distress with movement in bed. Able to speak complete sentences comfortably.  HEENT: Normocephalic, Atraumatic. No scleral icterus.  Resp: Decreased breath sounds throughout, no wheezing, no increased work of breathing at rest  Cardiac: RRR, no murmurs   Abdomen: Soft, Non-tender, Non-distended  Extremities: No peripheral edema.   Neurologic: No gross neurological deficits  Psych: Calm, cooperative. Normal mood and affect.      Laboratory  Lab Results   Component Value Date    WBC 7.34 01/15/2022    HGB 12.0 (L) 01/15/2022    HCT 39.9 (L) 01/15/2022    MCV 84 01/15/2022     01/15/2022     Recent Labs   Lab 01/15/22  1005         K 4.3   CL 98   CO2 30*   BUN 10   CREATININE 0.8   CALCIUM 9.2     Lab Results   Component Value Date    INR 1.0 04/19/2020    INR 1.0 01/03/2020    INR 1.1 11/30/2019     Lab Results   Component Value Date    HGBA1C 5.8 (H) 05/30/2021     No results for input(s): POCTGLUCOSE in the last 72 hours.    Diagnostic Results:  Labs: Reviewed  X-Ray: Reviewed    ASSESSMENT & PLAN:     Current Problems  List:  Active Hospital Problems    Diagnosis  POA    Acute on chronic respiratory failure with hypoxia and hypercapnia [J96.21, J96.22]  Yes    COPD exacerbation [J44.1]  Unknown      Resolved Hospital Problems   No resolved problems to display.     COPD exacerbation  Acute on chronic respiratory failure  - COVID and Flu negative. Pending respiratory viral panel  - Previous concerns for noncompliance or improper use of inhalers  - O2 as needed. Has needed BiPAP on previous admission  - s/p solumedrol 125mg in ED. Continue prednisone 40mg daily x4 days.  - Received ceftriaxone in ED, but low suspicion for bacterial pneumonia based on CXR and absence of productive cough. Will empirically start a course of azithro 250mg daily x5 days.  - Duonebs  - needs Influenza vaccine and PPV23    Tobacco use  - counseled on cessation    Elevated LFTs  - present since last month  - outpatient U/S and possible hepatology referral      Chronic medical problems:  HTN - continue amlodipine  HLD - continue ASA, statin      DVT ppx: lovenox  Diet: regular  Dispo: home once medically stable      Jem Harris MD  [facility]

## 2022-01-16 VITALS
OXYGEN SATURATION: 92 % | TEMPERATURE: 99 F | DIASTOLIC BLOOD PRESSURE: 81 MMHG | HEART RATE: 117 BPM | RESPIRATION RATE: 20 BRPM | SYSTOLIC BLOOD PRESSURE: 159 MMHG | BODY MASS INDEX: 21.05 KG/M2 | WEIGHT: 138.88 LBS | HEIGHT: 68 IN

## 2022-01-16 LAB
ALBUMIN SERPL BCP-MCNC: 3.1 G/DL (ref 3.5–5.2)
ALP SERPL-CCNC: 83 U/L (ref 55–135)
ALT SERPL W/O P-5'-P-CCNC: 69 U/L (ref 10–44)
ANION GAP SERPL CALC-SCNC: 9 MMOL/L (ref 8–16)
AST SERPL-CCNC: 34 U/L (ref 10–40)
BASOPHILS # BLD AUTO: 0.01 K/UL (ref 0–0.2)
BASOPHILS NFR BLD: 0.1 % (ref 0–1.9)
BILIRUB SERPL-MCNC: 0.4 MG/DL (ref 0.1–1)
BUN SERPL-MCNC: 12 MG/DL (ref 6–20)
CALCIUM SERPL-MCNC: 9.6 MG/DL (ref 8.7–10.5)
CHLORIDE SERPL-SCNC: 100 MMOL/L (ref 95–110)
CO2 SERPL-SCNC: 28 MMOL/L (ref 23–29)
CREAT SERPL-MCNC: 0.7 MG/DL (ref 0.5–1.4)
DIFFERENTIAL METHOD: ABNORMAL
EOSINOPHIL # BLD AUTO: 0 K/UL (ref 0–0.5)
EOSINOPHIL NFR BLD: 0 % (ref 0–8)
ERYTHROCYTE [DISTWIDTH] IN BLOOD BY AUTOMATED COUNT: 18.9 % (ref 11.5–14.5)
EST. GFR  (AFRICAN AMERICAN): >60 ML/MIN/1.73 M^2
EST. GFR  (NON AFRICAN AMERICAN): >60 ML/MIN/1.73 M^2
GLUCOSE SERPL-MCNC: 115 MG/DL (ref 70–110)
HCT VFR BLD AUTO: 40.7 % (ref 40–54)
HGB BLD-MCNC: 12.7 G/DL (ref 14–18)
IMM GRANULOCYTES # BLD AUTO: 0.03 K/UL (ref 0–0.04)
IMM GRANULOCYTES NFR BLD AUTO: 0.4 % (ref 0–0.5)
LYMPHOCYTES # BLD AUTO: 0.5 K/UL (ref 1–4.8)
LYMPHOCYTES NFR BLD: 6.9 % (ref 18–48)
MAGNESIUM SERPL-MCNC: 2 MG/DL (ref 1.6–2.6)
MCH RBC QN AUTO: 25 PG (ref 27–31)
MCHC RBC AUTO-ENTMCNC: 31.2 G/DL (ref 32–36)
MCV RBC AUTO: 80 FL (ref 82–98)
MONOCYTES # BLD AUTO: 0.8 K/UL (ref 0.3–1)
MONOCYTES NFR BLD: 9.8 % (ref 4–15)
NEUTROPHILS # BLD AUTO: 6.5 K/UL (ref 1.8–7.7)
NEUTROPHILS NFR BLD: 82.8 % (ref 38–73)
NRBC BLD-RTO: 0 /100 WBC
PHOSPHATE SERPL-MCNC: 2.8 MG/DL (ref 2.7–4.5)
PLATELET # BLD AUTO: 234 K/UL (ref 150–450)
PMV BLD AUTO: 9.9 FL (ref 9.2–12.9)
POTASSIUM SERPL-SCNC: 4.1 MMOL/L (ref 3.5–5.1)
PROT SERPL-MCNC: 6.8 G/DL (ref 6–8.4)
RBC # BLD AUTO: 5.08 M/UL (ref 4.6–6.2)
SODIUM SERPL-SCNC: 137 MMOL/L (ref 136–145)
WBC # BLD AUTO: 7.79 K/UL (ref 3.9–12.7)

## 2022-01-16 PROCEDURE — S4991 NICOTINE PATCH NONLEGEND: HCPCS | Performed by: INTERNAL MEDICINE

## 2022-01-16 PROCEDURE — 99225 PR SUBSEQUENT OBSERVATION CARE,LEVEL II: CPT | Mod: ,,, | Performed by: INTERNAL MEDICINE

## 2022-01-16 PROCEDURE — 25000242 PHARM REV CODE 250 ALT 637 W/ HCPCS: Performed by: INTERNAL MEDICINE

## 2022-01-16 PROCEDURE — 25000003 PHARM REV CODE 250: Performed by: INTERNAL MEDICINE

## 2022-01-16 PROCEDURE — 94761 N-INVAS EAR/PLS OXIMETRY MLT: CPT

## 2022-01-16 PROCEDURE — 84100 ASSAY OF PHOSPHORUS: CPT | Performed by: INTERNAL MEDICINE

## 2022-01-16 PROCEDURE — 85025 COMPLETE CBC W/AUTO DIFF WBC: CPT | Performed by: INTERNAL MEDICINE

## 2022-01-16 PROCEDURE — 99900035 HC TECH TIME PER 15 MIN (STAT)

## 2022-01-16 PROCEDURE — 63600175 PHARM REV CODE 636 W HCPCS: Performed by: INTERNAL MEDICINE

## 2022-01-16 PROCEDURE — 27000221 HC OXYGEN, UP TO 24 HOURS

## 2022-01-16 PROCEDURE — 80053 COMPREHEN METABOLIC PANEL: CPT | Performed by: INTERNAL MEDICINE

## 2022-01-16 PROCEDURE — 94640 AIRWAY INHALATION TREATMENT: CPT

## 2022-01-16 PROCEDURE — G0378 HOSPITAL OBSERVATION PER HR: HCPCS

## 2022-01-16 PROCEDURE — 36415 COLL VENOUS BLD VENIPUNCTURE: CPT | Performed by: INTERNAL MEDICINE

## 2022-01-16 PROCEDURE — 99225 PR SUBSEQUENT OBSERVATION CARE,LEVEL II: ICD-10-PCS | Mod: ,,, | Performed by: INTERNAL MEDICINE

## 2022-01-16 PROCEDURE — 83735 ASSAY OF MAGNESIUM: CPT | Performed by: INTERNAL MEDICINE

## 2022-01-16 RX ORDER — IPRATROPIUM BROMIDE AND ALBUTEROL SULFATE 2.5; .5 MG/3ML; MG/3ML
3 SOLUTION RESPIRATORY (INHALATION) EVERY 6 HOURS PRN
Status: DISCONTINUED | OUTPATIENT
Start: 2022-01-16 | End: 2022-01-16 | Stop reason: HOSPADM

## 2022-01-16 RX ORDER — BUDESONIDE, GLYCOPYRROLATE, AND FORMOTEROL FUMARATE 160; 9; 4.8 UG/1; UG/1; UG/1
2 AEROSOL, METERED RESPIRATORY (INHALATION) 2 TIMES DAILY
Qty: 10.7 G | Refills: 11 | Status: SHIPPED | OUTPATIENT
Start: 2022-01-16 | End: 2022-02-07 | Stop reason: SDUPTHER

## 2022-01-16 RX ORDER — PREDNISONE 20 MG/1
TABLET ORAL
Qty: 30 TABLET | Refills: 0 | Status: ON HOLD | OUTPATIENT
Start: 2022-01-16 | End: 2022-01-30 | Stop reason: SDUPTHER

## 2022-01-16 RX ORDER — IPRATROPIUM BROMIDE AND ALBUTEROL SULFATE 2.5; .5 MG/3ML; MG/3ML
3 SOLUTION RESPIRATORY (INHALATION) EVERY 4 HOURS
Status: DISCONTINUED | OUTPATIENT
Start: 2022-01-16 | End: 2022-01-16 | Stop reason: HOSPADM

## 2022-01-16 RX ORDER — IPRATROPIUM BROMIDE AND ALBUTEROL SULFATE 2.5; .5 MG/3ML; MG/3ML
3 SOLUTION RESPIRATORY (INHALATION) EVERY 4 HOURS PRN
Qty: 180 ML | Refills: 11 | Status: SHIPPED | OUTPATIENT
Start: 2022-01-16 | End: 2022-02-07 | Stop reason: SDUPTHER

## 2022-01-16 RX ORDER — CALCIUM CARBONATE 200(500)MG
500 TABLET,CHEWABLE ORAL 3 TIMES DAILY PRN
Status: DISCONTINUED | OUTPATIENT
Start: 2022-01-16 | End: 2022-01-16 | Stop reason: HOSPADM

## 2022-01-16 RX ADMIN — IPRATROPIUM BROMIDE AND ALBUTEROL SULFATE 3 ML: 2.5; .5 SOLUTION RESPIRATORY (INHALATION) at 04:01

## 2022-01-16 RX ADMIN — AMLODIPINE BESYLATE 10 MG: 10 TABLET ORAL at 08:01

## 2022-01-16 RX ADMIN — IPRATROPIUM BROMIDE AND ALBUTEROL SULFATE 3 ML: 2.5; .5 SOLUTION RESPIRATORY (INHALATION) at 08:01

## 2022-01-16 RX ADMIN — ASPIRIN 81 MG: 81 TABLET, COATED ORAL at 08:01

## 2022-01-16 RX ADMIN — CALCIUM CARBONATE (ANTACID) CHEW TAB 500 MG 500 MG: 500 CHEW TAB at 06:01

## 2022-01-16 RX ADMIN — PREDNISONE 40 MG: 20 TABLET ORAL at 08:01

## 2022-01-16 RX ADMIN — IPRATROPIUM BROMIDE AND ALBUTEROL SULFATE 3 ML: 2.5; .5 SOLUTION RESPIRATORY (INHALATION) at 01:01

## 2022-01-16 RX ADMIN — NICOTINE 1 PATCH: 14 PATCH TRANSDERMAL at 08:01

## 2022-01-16 RX ADMIN — ATORVASTATIN CALCIUM 40 MG: 40 TABLET, FILM COATED ORAL at 08:01

## 2022-01-16 NOTE — PLAN OF CARE
Problem: Adult Inpatient Plan of Care  Goal: Plan of Care Review  Outcome: Met  Goal: Patient-Specific Goal (Individualized)  Outcome: Met  Goal: Absence of Hospital-Acquired Illness or Injury  Outcome: Met  Goal: Optimal Comfort and Wellbeing  Outcome: Met  Goal: Readiness for Transition of Care  Outcome: Met   Patient is alert, oriented and conscious. Vital signs checked and recorded. SPO2 maintain in 2 litre O2 by NC. Medication given as per order. Intake output recorded. Will continue to monitor.

## 2022-01-16 NOTE — NURSING
Patient received from ED. Handoff report received. Patient is alert, oriented and conscious. Will continue to monitor.

## 2022-01-16 NOTE — NURSING
Discharge instruction education and documents given to patient. Medicine received at bedside. [facility]

## 2022-01-16 NOTE — PLAN OF CARE
Olaf delivered Nebulizer to pt's room after approval from Toña Cm with [facility]. Olaf also setup wc van transport for 300pm via .

## 2022-01-16 NOTE — PLAN OF CARE
Problem: Adult Inpatient Plan of Care  Goal: Plan of Care Review  Outcome: Ongoing, Progressing  Goal: Patient-Specific Goal (Individualized)  Outcome: Ongoing, Progressing  Goal: Absence of Hospital-Acquired Illness or Injury  Outcome: Ongoing, Progressing  Goal: Optimal Comfort and Wellbeing  Outcome: Ongoing, Progressing  Goal: Readiness for Transition of Care  Outcome: Ongoing, Progressing   Patient is alert, oriented and conscious. Vital signs taken and recorded. SPO2 maintain in 2 litre O2 by NC. Medication given as per order. Mobilization done in room. Intake output recorded. Will continue to monitor.

## 2022-01-16 NOTE — PLAN OF CARE
[facility] - Telemetry Stepdown      HOME HEALTH ORDERS  FACE TO FACE ENCOUNTER    Patient Name: Baltazar Mccray  YOB: 1960    [facility]   [facility] [address] [facility] [place] [address]  PCP Phone Number: [phone]  PCP Fax: [phone]    Encounter Date: 1/15/22    Admit to Home Health    Diagnoses:  Active Hospital Problems    Diagnosis  POA    *Acute on chronic respiratory failure with hypoxia and hypercapnia [J96.21, J96.22]  Yes    COPD exacerbation [J44.1]  Yes      Resolved Hospital Problems   No resolved problems to display.       Follow Up Appointments:  No future appointments.    Allergies:  Review of patient's allergies indicates:   Allergen Reactions    Benazepril Swelling    Duoneb [ipratropium-albuterol]      Report minor Tremors, pt seems to be tolerating well.       Medications: Review discharge medications with patient and family and provide education.      Current Discharge Medication List      CONTINUE these medications which have CHANGED    Details   albuterol-ipratropium (DUO-NEB) 2.5 mg-0.5 mg/3 mL nebulizer solution Take 3 mLs by nebulization every 4 (four) hours as needed for Wheezing or Shortness of Breath. Rescue  Qty: 180 mL, Refills: 11    Comments: BEDSIDE DELIVERY ALL MEDS      budesonide-glycopyr-formoterol (BREZTRI AEROSPHERE) 160-9-4.8 mcg/actuation HFAA Inhale 2 puffs into the lungs 2 (two) times daily. Rinse mouth after use.  Qty: 10.7 g, Refills: 11    Comments: Patient has failed prior inhaler therapy  BEDSIDE DELIVERY  Associated Diagnoses: Chronic obstructive pulmonary disease, unspecified COPD type      predniSONE (DELTASONE) 20 MG tablet Take 40 mg for 5 days then 20 mg for 5 days then stop  Qty: 30 tablet, Refills: 0         CONTINUE these medications which have NOT CHANGED    Details   amLODIPine (NORVASC) 10 MG tablet Take 1 tablet (10 mg total) by mouth once daily.    Comments: .      aspirin (ECOTRIN) 81 MG EC  tablet Take 1 tablet (81 mg total) by mouth once daily.  Qty: 90 tablet, Refills: 3      atorvastatin (LIPITOR) 40 MG tablet Take 1 tablet (40 mg total) by mouth once daily.  Qty: 30 tablet, Refills: 11      acetaminophen (TYLENOL) 325 MG tablet Take 2 tablets (650 mg total) by mouth every 8 (eight) hours as needed for Pain or Temperature greater than (100.5).  Refills: 0    Associated Diagnoses: Acute on chronic respiratory failure with hypoxia and hypercapnia      folic acid (FOLVITE) 1 MG tablet Take 1 tablet (1 mg total) by mouth once daily.  Qty: 90 tablet, Refills: 0      multivitamin Tab Take 1 tablet by mouth once daily.  Qty: 90 tablet, Refills: 0      nicotine (NICODERM CQ) 7 mg/24 hr Place 1 patch onto the skin once daily.  Qty: 90 patch, Refills: 0      pulse oximeter (PULSE OXIMETER) device by Apply Externally route 2 (two) times a day. Use twice daily at 8 AM and 3 PM and record the value in [facility] as directed.  Qty: 1 each, Refills: 0    Comments: This is a NO CHARGE item.  Please override price to zero.  DO NOT PRINT.  NORMAL MODE e-PRESCRIBE ONLY.      senna (SENOKOT) 8.6 mg tablet Take 1 tablet by mouth daily as needed for Constipation.      thiamine 100 MG tablet Take 1 tablet (100 mg total) by mouth once daily.  Qty: 90 tablet, Refills: 0         STOP taking these medications       albuterol (PROVENTIL/VENTOLIN HFA) 90 mcg/actuation inhaler Comments:   Reason for Stopping:         ipratropium-albuteroL (COMBIVENT)  mcg/actuation inhaler Comments:   Reason for Stopping:                 I have seen and examined this patient within the last 30 days. My clinical findings that support the need for the home health skilled services and home bound status are the following:no   Weakness/numbness causing balance and gait disturbance due to COPD Exacerbation making it taxing to leave home.     Diet:   2 gram sodium diet    Labs: As per PCP    Referrals/ Consults  Physical Therapy to evaluate and  treat. Evaluate for home safety and equipment needs; Establish/upgrade home exercise program. Perform / instruct on therapeutic exercises, gait training, transfer training, and Range of Motion.  Occupational Therapy to evaluate and treat. Evaluate home environment for safety and equipment needs. Perform/Instruct on transfers, ADL training, ROM, and therapeutic exercises.   to evaluate for community resources/long-range planning.  Aide to provide assistance with personal care, ADLs, and vital signs.    Activities:   activity as tolerated    Nursing:   Agency to admit patient within 24 hours of hospital discharge unless specified on physician order or at patient request    SN to complete comprehensive assessment including routine vital signs. Instruct on disease process and s/s of complications to report to MD. Review/verify medication list sent home with the patient at time of discharge  and instruct patient/caregiver as needed. Frequency may be adjusted depending on start of care date.     Skilled nurse to perform up to 3 visits PRN for symptoms related to diagnosis    Notify MD if SBP > 160 or < 90; DBP > 90 or < 50; HR > 120 or < 50; Temp > 101; O2 < 88%; Other:       Ok to schedule additional visits based on staff availability and patient request on consecutive days within the home health episode.    When multiple disciplines ordered:    Start of Care occurs on Sunday - Wednesday schedule remaining discipline evaluations as ordered on separate consecutive days following the start of care.    Thursday SOC -schedule subsequent evaluations Friday and Monday the following week.     Friday - Saturday SOC - schedule subsequent discipline evaluations on consecutive days starting Monday of the following week.    For all post-discharge communication and subsequent orders please contact patient's primary care physician. If unable to reach primary care physician or do not receive response within 30 minutes,  please contact hospitalist on call for clinical staff order clarification    Miscellaneous   Home Oxygen:  Oxygen at 2 L/min nasal canula to be used:  Continuously. and Assess oxygen saturation via pulse oximeter as needed for increase in SOB.    Home Health Aide:  Physical Therapy Three times weekly, Occupational Therapy Three times weekly, Medical Social Work Weekly, Respiratory Therapy Twice weekly and Home Health Aide Three times weekly    Wound Care Orders  n/a    I certify that this patient is confined to his home and needs physical therapy and occupational therapy.

## 2022-01-16 NOTE — DISCHARGE SUMMARY
[facility]  Discharge Summary  Hospital Medicine    Patient Name: Baltazar Mccray  YOB: 1960    Admit Date: 1/15/2022    Discharge Date and Time: 1/16/2022    Discharge Attending Physician: Shiraz Sun MD     Team: [facility] HOSP MED G    Reason for Admission:   Chief Complaint   Patient presents with    Shortness of Breath     Pt presents from home with c/o shortness of breath x 2 wks. He reports he was supposed to get a nebulizer machine at home and do treatments but has not gotten it yet. Pt wears home oxygen at 2 L nc and sometimes increases it to 3 L. Pt denies pain. GCS 15.        Active Hospital Problems    Diagnosis  POA    *Acute on chronic respiratory failure with hypoxia and hypercapnia [J96.21, J96.22]  Yes    COPD exacerbation [J44.1]  Yes      Resolved Hospital Problems   No resolved problems to display.       HPI:   Mr. Baltazar Mccray is a 61 y.o. male with COPD with chronic respiratory failure (on 2L home O2), tobacco use, CAD, hx seizures, hx crack/cocaine use (20yrs ago) who presented with shortness of breath.     Patient presents with progressive shortness of breath and running out of COPD medications. He was recently in ED on 1/1/22 for COPD exacerbation, discharged after nebs x2 and on baseline home O2 on course of prednisone. He was admitted recently end of Dec 2021 for COPD exacerbation when he required brief course of BiPAP. He did well for about a week after recent ED presentation, but then had progressive shortness of breath again. At baseline, he is on 2L NC and able to ambulate around his home. However, now unable to walk a few steps, has required 3-4L NC. He has been using albuterol inhaler every 3-4 hrs including in the middle of the night. He does not recall his other inhalers, but knows he is on at least two and reports compliance. He ran out of albuterol and other inhalers last night. Otherwise, denies fevers, chills, productive cough, chest pain, sick contacts  (lives alone). He received COVID vaccine including booster, but has not received Influenza vaccine. He continues to smoke 1/2 ppd with last cigarette about a week ago.     Here, afebrile, , BP elevated, 100% on 2L NC.  WBC normal.  COVID negative  CXR unremarkable.  Last TTE 5/2021 with normal EF and diastolic function.    Hospital Course:   Patient admitted for acute on chronic respiratory failure secondary to COVID exacerbation. He was started on duonebs and steroids. Significantly improved with supportive care, now ambulating well on 2L NC without desaturation. He is stable for discharge home today with nebulizer and steroid taper. Resume home inhalers. PCP and pulmonary follow up in 1-2 weeks. Pulmonary rehab ordered. He is completely vaccinated. Highly encourage smoking cessation.    Principal Problem: Acute on chronic respiratory failure with hypoxia and hypercapnia    Other Problems Addressed:  COPD exacerbation  Tobacco use  Elevated LFTs  HTN   HLD     Procedures Performed: * No surgery found *    Special Care, Treatment, and Services Provided: none    Consults: None    Significant Diagnostic Studies:  EF   Date Value Ref Range Status   05/13/2021 55 % Final     Hemoglobin A1C   Date Value Ref Range Status   05/30/2021 5.8 (H) 4.0 - 5.6 % Final     Comment:     ADA Screening Guidelines:  5.7-6.4%  Consistent with prediabetes  >or=6.5%  Consistent with diabetes    High levels of fetal hemoglobin interfere with the HbA1C  assay. Heterozygous hemoglobin variants (HbS, HgC, etc)do  not significantly interfere with this assay.   However, presence of multiple variants may affect accuracy.     05/23/2021 5.9 (H) 4.0 - 5.6 % Final     Comment:     ADA Screening Guidelines:  5.7-6.4%  Consistent with prediabetes  >or=6.5%  Consistent with diabetes    High levels of fetal hemoglobin interfere with the HbA1C  assay. Heterozygous hemoglobin variants (HbS, HgC, etc)do  not significantly interfere with this assay.  "  However, presence of multiple variants may affect accuracy.       All labs and imaging reviewed     Final Diagnoses: Same as principal problem.    Discharged Condition: stable  Face to face services were provided on 1/16/2022   Time Spent:  I spent > 30 minutes on the discharge, which included reviewing hospital course with patient/family, reviewing discharge medications, and arranging follow-up care.  Physical Exam on 1/16/2022:  BP (!) 159/81 (BP Location: Right arm, Patient Position: Lying)   Pulse 105   Temp 98.7 °F (37.1 °C) (Oral)   Resp 18   Ht 5' 8" (1.727 m)   Wt 63 kg (138 lb 14.2 oz)   SpO2 95%   BMI 21.12 kg/m²     General: Appears non toxic and comfortable   HEENT: Normocephalic, Atraumatic. No scleral icterus.  Resp: Decreased breath sounds throughout, no wheezing, no increased work of breathing at rest  Cardiac: RRR, no murmurs   Abdomen: Soft, Non-tender, Non-distended  Extremities: No peripheral edema.   Neurologic: No gross neurological deficits  Psych: Calm, cooperative. Normal mood and affect.       Disposition: Home or Self Care    Follow Up Instructions:    Follow-up Information     [facility]. Schedule an appointment as soon as possible for a visit in 1 week.    Why: Post hospital follow up  Contact information:  [address]  [facility] [id number] [phone]                       No future appointments.    Medications:  Reconciled Home Medications:      Medication List      CHANGE how you take these medications    albuterol-ipratropium 2.5 mg-0.5 mg/3 mL nebulizer solution  Commonly known as: DUO-NEB  Take 3 mLs by nebulization every 4 (four) hours as needed for Wheezing or Shortness of Breath. Rescue  What changed: Another medication with the same name was removed. Continue taking this medication, and follow the directions you see here.     predniSONE 20 MG tablet  Commonly known as: DELTASONE  Take 40 mg for 5 days then 20 mg for 5 days then " "stop  What changed:   · medication strength  · how much to take  · how to take this  · when to take this  · additional instructions        CONTINUE taking these medications    acetaminophen 325 MG tablet  Commonly known as: TYLENOL  Take 2 tablets (650 mg total) by mouth every 8 (eight) hours as needed for Pain or Temperature greater than (100.5).     amLODIPine 10 MG tablet  Commonly known as: NORVASC  Take 1 tablet (10 mg total) by mouth once daily.     aspirin 81 MG EC tablet  Commonly known as: ECOTRIN  Take 1 tablet (81 mg total) by mouth once daily.     atorvastatin 40 MG tablet  Commonly known as: LIPITOR  Take 1 tablet (40 mg total) by mouth once daily.     BREZTRI AEROSPHERE 160-9-4.8 mcg/actuation Hfaa  Generic drug: budesonide-glycopyr-formoterol  Inhale 2 puffs into the lungs 2 (two) times daily. Rinse mouth after use.     folic acid 1 MG tablet  Commonly known as: FOLVITE  Take 1 tablet (1 mg total) by mouth once daily.     nicotine 7 mg/24 hr  Commonly known as: NICODERM CQ  Place 1 patch onto the skin once daily.     pulse oximeter device  Commonly known as: pulse oximeter  by Apply Externally route 2 (two) times a day. Use twice daily at 8 AM and 3 PM and [facility] the [facility] as directed.     senna 8.6 mg tablet  Commonly known as: SENOKOT  Take 1 tablet by mouth daily as needed for Constipation.     THERA-TABS tablet  Generic drug: multivitamin  Take 1 tablet by mouth once daily.     thiamine 100 MG tablet  Take 1 tablet (100 mg total) by mouth once daily.        STOP taking these medications    albuterol 90 mcg/actuation inhaler  Commonly known as: PROVENTIL/VENTOLIN HFA            Discharge Instructions:  Discharge Procedure Orders   NEBULIZER FOR HOME USE     Order Specific Question Answer Comments   Height: 5' 8" (1.727 m)    Weight: 63 kg (138 lb 14.2 oz)    Length of need (1-99 months): 99      Ambulatory referral/consult to Pulmonology   Standing Status: Future   Referral Priority: " Routine Referral Type: Consultation   Referral Reason: Specialty Services Required   Requested Specialty: Pulmonary Disease   Number of Visits Requested: 1     Ambulatory referral/consult to Pulmonary Rehab   Standing Status: Future   Referral Priority: Routine Referral Type: Consultation   Referral Reason: Specialty Services Required   Requested Specialty: Pulmonary Disease   Number of Visits Requested: 1     Ambulatory referral/consult to Outpatient Case Management   Referral Priority: Routine Referral Type: Consultation   Referral Reason: Specialty Services Required   Number of Visits Requested: 1     Ambulatory referral/consult to Physical/Occupational Therapy   Standing Status: Future   Referral Priority: Routine Referral Type: Physical Medicine   Referral Reason: Specialty Services Required   Number of Visits Requested: 1         Shiraz Sun MD  Department of Hospital Medicine

## 2022-01-16 NOTE — PLAN OF CARE
Problem: Adult Inpatient Plan of Care  Goal: Absence of Hospital-Acquired Illness or Injury  Intervention: Identify and Manage Fall Risk  Flowsheets (Taken 1/16/2022 0531)  Safety Promotion/Fall Prevention: assistive device/personal item within reach  Intervention: Prevent Skin Injury  Flowsheets (Taken 1/16/2022 0531)  Body Position: position changed independently  Skin Protection: incontinence pads utilized  Intervention: Prevent and Manage VTE (Venous Thromboembolism) Risk  Flowsheets (Taken 1/16/2022 0531)  VTE Prevention/Management: bleeding precations maintained   Pt in bed with no complaints voiced at this time.  No acute distress noted at this time.  Safety measures in progress.  Call light in reach and frequent rounding in progress.

## 2022-01-16 NOTE — RESPIRATORY THERAPY
RAPID RESPONSE RESPIRATORY CHART CHECK       Chart check completed, DME prepared and expected discharge today.

## 2022-01-28 ENCOUNTER — HOSPITAL ENCOUNTER (INPATIENT)
Facility: HOSPITAL | Age: 62
LOS: 2 days | Discharge: HOME | DRG: 189 | End: 2022-01-30
Attending: EMERGENCY MEDICINE | Admitting: GENERAL ACUTE CARE HOSPITAL
Payer: MEDICAID

## 2022-01-28 DIAGNOSIS — R79.89 TROPONIN LEVEL ELEVATED: ICD-10-CM

## 2022-01-28 DIAGNOSIS — R00.0 TACHYCARDIA: ICD-10-CM

## 2022-01-28 DIAGNOSIS — R06.02 SHORTNESS OF BREATH: ICD-10-CM

## 2022-01-28 DIAGNOSIS — I48.91 A-FIB: ICD-10-CM

## 2022-01-28 DIAGNOSIS — J44.1 COPD WITH ACUTE EXACERBATION: Primary | ICD-10-CM

## 2022-01-28 DIAGNOSIS — J96.02 ACUTE RESPIRATORY FAILURE WITH HYPERCAPNIA: ICD-10-CM

## 2022-01-28 PROBLEM — R74.8 ELEVATED LIVER ENZYMES: Status: ACTIVE | Noted: 2022-01-28

## 2022-01-28 LAB
ALBUMIN SERPL BCP-MCNC: 3.2 G/DL (ref 3.5–5.2)
ALLENS TEST: ABNORMAL
ALLENS TEST: ABNORMAL
ALP SERPL-CCNC: 94 U/L (ref 55–135)
ALT SERPL W/O P-5'-P-CCNC: 91 U/L (ref 10–44)
AMPHET+METHAMPHET UR QL: NEGATIVE
ANION GAP SERPL CALC-SCNC: 13 MMOL/L (ref 8–16)
AST SERPL-CCNC: 48 U/L (ref 10–40)
BARBITURATES UR QL SCN>200 NG/ML: NEGATIVE
BASOPHILS # BLD AUTO: 0.04 K/UL (ref 0–0.2)
BASOPHILS NFR BLD: 0.4 % (ref 0–1.9)
BENZODIAZ UR QL SCN>200 NG/ML: NEGATIVE
BILIRUB SERPL-MCNC: 0.4 MG/DL (ref 0.1–1)
BNP SERPL-MCNC: 1208 PG/ML (ref 0–99)
BUN SERPL-MCNC: 7 MG/DL (ref 6–20)
BZE UR QL SCN: NEGATIVE
CALCIUM SERPL-MCNC: 8.1 MG/DL (ref 8.7–10.5)
CANNABINOIDS UR QL SCN: NEGATIVE
CHLORIDE SERPL-SCNC: 98 MMOL/L (ref 95–110)
CO2 SERPL-SCNC: 30 MMOL/L (ref 23–29)
CREAT SERPL-MCNC: 0.8 MG/DL (ref 0.5–1.4)
CREAT UR-MCNC: 82 MG/DL (ref 23–375)
CTP QC/QA: YES
D DIMER PPP IA.FEU-MCNC: 0.67 MG/L FEU
DELSYS: ABNORMAL
DIFFERENTIAL METHOD: ABNORMAL
EOSINOPHIL # BLD AUTO: 0.2 K/UL (ref 0–0.5)
EOSINOPHIL NFR BLD: 1.9 % (ref 0–8)
EP: 5
ERYTHROCYTE [DISTWIDTH] IN BLOOD BY AUTOMATED COUNT: 19.8 % (ref 11.5–14.5)
ERYTHROCYTE [SEDIMENTATION RATE] IN BLOOD BY WESTERGREN METHOD: 16 MM/H
EST. GFR  (AFRICAN AMERICAN): >60 ML/MIN/1.73 M^2
EST. GFR  (NON AFRICAN AMERICAN): >60 ML/MIN/1.73 M^2
ETHANOL UR-MCNC: 52 MG/DL
FIO2: 30
GLUCOSE SERPL-MCNC: 89 MG/DL (ref 70–110)
HCO3 UR-SCNC: 37.7 MMOL/L (ref 24–28)
HCO3 UR-SCNC: 39.8 MMOL/L (ref 24–28)
HCT VFR BLD AUTO: 43 % (ref 40–54)
HGB BLD-MCNC: 13 G/DL (ref 14–18)
IMM GRANULOCYTES # BLD AUTO: 0.07 K/UL (ref 0–0.04)
IMM GRANULOCYTES NFR BLD AUTO: 0.6 % (ref 0–0.5)
IP: 10
LYMPHOCYTES # BLD AUTO: 3.2 K/UL (ref 1–4.8)
LYMPHOCYTES NFR BLD: 29.4 % (ref 18–48)
MCH RBC QN AUTO: 25.5 PG (ref 27–31)
MCHC RBC AUTO-ENTMCNC: 30.2 G/DL (ref 32–36)
MCV RBC AUTO: 84 FL (ref 82–98)
METHADONE UR QL SCN>300 NG/ML: NEGATIVE
MODE: ABNORMAL
MONOCYTES # BLD AUTO: 0.9 K/UL (ref 0.3–1)
MONOCYTES NFR BLD: 8 % (ref 4–15)
NEUTROPHILS # BLD AUTO: 6.4 K/UL (ref 1.8–7.7)
NEUTROPHILS NFR BLD: 59.7 % (ref 38–73)
NRBC BLD-RTO: 0 /100 WBC
OPIATES UR QL SCN: NEGATIVE
PCO2 BLDA: 64.5 MMHG (ref 35–45)
PCO2 BLDA: 72.9 MMHG (ref 35–45)
PCP UR QL SCN>25 NG/ML: NEGATIVE
PH SMN: 7.35 [PH] (ref 7.35–7.45)
PH SMN: 7.38 [PH] (ref 7.35–7.45)
PLATELET # BLD AUTO: 311 K/UL (ref 150–450)
PMV BLD AUTO: 10.3 FL (ref 9.2–12.9)
PO2 BLDA: 37 MMHG (ref 40–60)
PO2 BLDA: 87 MMHG (ref 40–60)
POC BE: 12 MMOL/L
POC BE: 14 MMOL/L
POC SATURATED O2: 67 % (ref 95–100)
POC SATURATED O2: 95 % (ref 95–100)
POC TCO2: 40 MMOL/L (ref 24–29)
POC TCO2: 42 MMOL/L (ref 24–29)
POCT GLUCOSE: 129 MG/DL (ref 70–110)
POCT GLUCOSE: 141 MG/DL (ref 70–110)
POTASSIUM SERPL-SCNC: 3.8 MMOL/L (ref 3.5–5.1)
PROT SERPL-MCNC: 7.2 G/DL (ref 6–8.4)
RBC # BLD AUTO: 5.1 M/UL (ref 4.6–6.2)
SAMPLE: ABNORMAL
SAMPLE: ABNORMAL
SARS-COV-2 RDRP RESP QL NAA+PROBE: NEGATIVE
SITE: ABNORMAL
SITE: ABNORMAL
SODIUM SERPL-SCNC: 141 MMOL/L (ref 136–145)
SP02: 94
SPONT RATE: 19
TOXICOLOGY INFORMATION: ABNORMAL
TROPONIN I SERPL DL<=0.01 NG/ML-MCNC: 0.13 NG/ML (ref 0–0.03)
TROPONIN I SERPL DL<=0.01 NG/ML-MCNC: 0.17 NG/ML (ref 0–0.03)
TROPONIN I SERPL DL<=0.01 NG/ML-MCNC: 0.17 NG/ML (ref 0–0.03)
WBC # BLD AUTO: 10.78 K/UL (ref 3.9–12.7)

## 2022-01-28 PROCEDURE — 25000242 PHARM REV CODE 250 ALT 637 W/ HCPCS

## 2022-01-28 PROCEDURE — 85025 COMPLETE CBC W/AUTO DIFF WBC: CPT | Performed by: EMERGENCY MEDICINE

## 2022-01-28 PROCEDURE — 82803 BLOOD GASES ANY COMBINATION: CPT

## 2022-01-28 PROCEDURE — 96374 THER/PROPH/DIAG INJ IV PUSH: CPT

## 2022-01-28 PROCEDURE — 99291 PR CRITICAL CARE, E/M 30-74 MINUTES: ICD-10-PCS | Mod: ,,, | Performed by: STUDENT IN AN ORGANIZED HEALTH CARE EDUCATION/TRAINING PROGRAM

## 2022-01-28 PROCEDURE — 99291 PR CRITICAL CARE, E/M 30-74 MINUTES: ICD-10-PCS | Mod: CS,,, | Performed by: EMERGENCY MEDICINE

## 2022-01-28 PROCEDURE — 99900035 HC TECH TIME PER 15 MIN (STAT)

## 2022-01-28 PROCEDURE — 63600175 PHARM REV CODE 636 W HCPCS: Performed by: STUDENT IN AN ORGANIZED HEALTH CARE EDUCATION/TRAINING PROGRAM

## 2022-01-28 PROCEDURE — 25000003 PHARM REV CODE 250: Performed by: STUDENT IN AN ORGANIZED HEALTH CARE EDUCATION/TRAINING PROGRAM

## 2022-01-28 PROCEDURE — 99291 CRITICAL CARE FIRST HOUR: CPT | Mod: CS,,, | Performed by: EMERGENCY MEDICINE

## 2022-01-28 PROCEDURE — 63600175 PHARM REV CODE 636 W HCPCS: Performed by: INTERNAL MEDICINE

## 2022-01-28 PROCEDURE — 80307 DRUG TEST PRSMV CHEM ANLYZR: CPT | Performed by: STUDENT IN AN ORGANIZED HEALTH CARE EDUCATION/TRAINING PROGRAM

## 2022-01-28 PROCEDURE — 25000003 PHARM REV CODE 250: Performed by: EMERGENCY MEDICINE

## 2022-01-28 PROCEDURE — 94660 CPAP INITIATION&MGMT: CPT

## 2022-01-28 PROCEDURE — 83880 ASSAY OF NATRIURETIC PEPTIDE: CPT | Performed by: EMERGENCY MEDICINE

## 2022-01-28 PROCEDURE — 25000242 PHARM REV CODE 250 ALT 637 W/ HCPCS: Performed by: STUDENT IN AN ORGANIZED HEALTH CARE EDUCATION/TRAINING PROGRAM

## 2022-01-28 PROCEDURE — 85379 FIBRIN DEGRADATION QUANT: CPT | Performed by: EMERGENCY MEDICINE

## 2022-01-28 PROCEDURE — 93010 ELECTROCARDIOGRAM REPORT: CPT | Mod: 59,,, | Performed by: INTERNAL MEDICINE

## 2022-01-28 PROCEDURE — 94640 AIRWAY INHALATION TREATMENT: CPT

## 2022-01-28 PROCEDURE — 25500020 PHARM REV CODE 255: Performed by: EMERGENCY MEDICINE

## 2022-01-28 PROCEDURE — 99291 CRITICAL CARE FIRST HOUR: CPT | Mod: ,,, | Performed by: STUDENT IN AN ORGANIZED HEALTH CARE EDUCATION/TRAINING PROGRAM

## 2022-01-28 PROCEDURE — 27000221 HC OXYGEN, UP TO 24 HOURS

## 2022-01-28 PROCEDURE — 25000003 PHARM REV CODE 250: Performed by: INTERNAL MEDICINE

## 2022-01-28 PROCEDURE — 99291 CRITICAL CARE FIRST HOUR: CPT | Mod: 25

## 2022-01-28 PROCEDURE — 80053 COMPREHEN METABOLIC PANEL: CPT | Performed by: EMERGENCY MEDICINE

## 2022-01-28 PROCEDURE — 27000190 HC CPAP FULL FACE MASK W/VALVE

## 2022-01-28 PROCEDURE — 93005 ELECTROCARDIOGRAM TRACING: CPT

## 2022-01-28 PROCEDURE — 94761 N-INVAS EAR/PLS OXIMETRY MLT: CPT

## 2022-01-28 PROCEDURE — 93010 EKG 12-LEAD: ICD-10-PCS | Mod: 59,,, | Performed by: INTERNAL MEDICINE

## 2022-01-28 PROCEDURE — 84484 ASSAY OF TROPONIN QUANT: CPT | Performed by: EMERGENCY MEDICINE

## 2022-01-28 PROCEDURE — 25000242 PHARM REV CODE 250 ALT 637 W/ HCPCS: Performed by: EMERGENCY MEDICINE

## 2022-01-28 PROCEDURE — 27100171 HC OXYGEN HIGH FLOW UP TO 24 HOURS

## 2022-01-28 PROCEDURE — U0002 COVID-19 LAB TEST NON-CDC: HCPCS | Performed by: EMERGENCY MEDICINE

## 2022-01-28 PROCEDURE — 84484 ASSAY OF TROPONIN QUANT: CPT | Mod: 91 | Performed by: STUDENT IN AN ORGANIZED HEALTH CARE EDUCATION/TRAINING PROGRAM

## 2022-01-28 PROCEDURE — 20000000 HC ICU ROOM

## 2022-01-28 RX ORDER — ALBUTEROL SULFATE 2.5 MG/.5ML
10 SOLUTION RESPIRATORY (INHALATION) ONCE
Status: COMPLETED | OUTPATIENT
Start: 2022-01-28 | End: 2022-01-28

## 2022-01-28 RX ORDER — LORAZEPAM 2 MG/ML
2 INJECTION INTRAMUSCULAR EVERY 4 HOURS PRN
Status: DISCONTINUED | OUTPATIENT
Start: 2022-01-28 | End: 2022-01-28

## 2022-01-28 RX ORDER — CYANOCOBALAMIN (VITAMIN B-12) 250 MCG
250 TABLET ORAL DAILY
Status: DISCONTINUED | OUTPATIENT
Start: 2022-01-28 | End: 2022-01-30 | Stop reason: HOSPADM

## 2022-01-28 RX ORDER — LEVALBUTEROL 1.25 MG/.5ML
1.25 SOLUTION, CONCENTRATE RESPIRATORY (INHALATION) EVERY 4 HOURS
Status: DISCONTINUED | OUTPATIENT
Start: 2022-01-28 | End: 2022-01-28

## 2022-01-28 RX ORDER — ACETAMINOPHEN 325 MG/1
650 TABLET ORAL EVERY 6 HOURS PRN
Status: DISCONTINUED | OUTPATIENT
Start: 2022-01-28 | End: 2022-01-30 | Stop reason: HOSPADM

## 2022-01-28 RX ORDER — DILTIAZEM HYDROCHLORIDE 5 MG/ML
0.35 INJECTION INTRAVENOUS ONCE
Status: COMPLETED | OUTPATIENT
Start: 2022-01-28 | End: 2022-01-28

## 2022-01-28 RX ORDER — LEVALBUTEROL 1.25 MG/.5ML
1.25 SOLUTION, CONCENTRATE RESPIRATORY (INHALATION) EVERY 4 HOURS PRN
Status: DISCONTINUED | OUTPATIENT
Start: 2022-01-28 | End: 2022-01-30 | Stop reason: HOSPADM

## 2022-01-28 RX ORDER — IPRATROPIUM BROMIDE 0.5 MG/2.5ML
SOLUTION RESPIRATORY (INHALATION)
Status: COMPLETED
Start: 2022-01-28 | End: 2022-01-28

## 2022-01-28 RX ORDER — ALBUTEROL SULFATE 2.5 MG/.5ML
2.5 SOLUTION RESPIRATORY (INHALATION)
Status: DISCONTINUED | OUTPATIENT
Start: 2022-01-28 | End: 2022-01-28

## 2022-01-28 RX ORDER — IPRATROPIUM BROMIDE AND ALBUTEROL SULFATE 2.5; .5 MG/3ML; MG/3ML
6 SOLUTION RESPIRATORY (INHALATION)
Status: DISCONTINUED | OUTPATIENT
Start: 2022-01-28 | End: 2022-01-28

## 2022-01-28 RX ORDER — METHOCARBAMOL 500 MG/1
500 TABLET, FILM COATED ORAL EVERY 6 HOURS PRN
Status: DISCONTINUED | OUTPATIENT
Start: 2022-01-28 | End: 2022-01-30 | Stop reason: HOSPADM

## 2022-01-28 RX ORDER — FOLIC ACID 1 MG/1
1 TABLET ORAL DAILY
Status: DISCONTINUED | OUTPATIENT
Start: 2022-01-28 | End: 2022-01-30 | Stop reason: HOSPADM

## 2022-01-28 RX ORDER — FUROSEMIDE 10 MG/ML
40 INJECTION INTRAMUSCULAR; INTRAVENOUS
Status: DISCONTINUED | OUTPATIENT
Start: 2022-01-28 | End: 2022-01-30 | Stop reason: HOSPADM

## 2022-01-28 RX ORDER — LEVALBUTEROL 1.25 MG/.5ML
1.25 SOLUTION, CONCENTRATE RESPIRATORY (INHALATION) EVERY 8 HOURS PRN
Status: DISCONTINUED | OUTPATIENT
Start: 2022-01-28 | End: 2022-01-28

## 2022-01-28 RX ORDER — FLUTICASONE FUROATE AND VILANTEROL 100; 25 UG/1; UG/1
1 POWDER RESPIRATORY (INHALATION) DAILY
Status: DISCONTINUED | OUTPATIENT
Start: 2022-01-29 | End: 2022-01-30 | Stop reason: HOSPADM

## 2022-01-28 RX ORDER — DILTIAZEM HCL 1 MG/ML
0-15 INJECTION, SOLUTION INTRAVENOUS CONTINUOUS
Status: DISCONTINUED | OUTPATIENT
Start: 2022-01-28 | End: 2022-01-28

## 2022-01-28 RX ORDER — DOXYCYCLINE HYCLATE 100 MG
100 TABLET ORAL
Status: COMPLETED | OUTPATIENT
Start: 2022-01-28 | End: 2022-01-28

## 2022-01-28 RX ORDER — METOPROLOL TARTRATE 1 MG/ML
5 INJECTION, SOLUTION INTRAVENOUS EVERY 5 MIN PRN
Status: DISCONTINUED | OUTPATIENT
Start: 2022-01-28 | End: 2022-01-28

## 2022-01-28 RX ORDER — METOPROLOL TARTRATE 25 MG/1
25 TABLET, FILM COATED ORAL 2 TIMES DAILY
Status: DISCONTINUED | OUTPATIENT
Start: 2022-01-28 | End: 2022-01-28

## 2022-01-28 RX ORDER — ATORVASTATIN CALCIUM 40 MG/1
40 TABLET, FILM COATED ORAL DAILY
Status: DISCONTINUED | OUTPATIENT
Start: 2022-01-28 | End: 2022-01-28

## 2022-01-28 RX ORDER — LEVOFLOXACIN 750 MG/1
750 TABLET ORAL DAILY
Status: DISCONTINUED | OUTPATIENT
Start: 2022-01-29 | End: 2022-01-30 | Stop reason: HOSPADM

## 2022-01-28 RX ORDER — OXYCODONE HYDROCHLORIDE 5 MG/1
5 TABLET ORAL ONCE
Status: COMPLETED | OUTPATIENT
Start: 2022-01-28 | End: 2022-01-28

## 2022-01-28 RX ORDER — IPRATROPIUM BROMIDE AND ALBUTEROL SULFATE 2.5; .5 MG/3ML; MG/3ML
3 SOLUTION RESPIRATORY (INHALATION)
Status: DISCONTINUED | OUTPATIENT
Start: 2022-01-28 | End: 2022-01-28

## 2022-01-28 RX ORDER — ENOXAPARIN SODIUM 100 MG/ML
40 INJECTION SUBCUTANEOUS EVERY 24 HOURS
Status: DISCONTINUED | OUTPATIENT
Start: 2022-01-28 | End: 2022-01-30 | Stop reason: HOSPADM

## 2022-01-28 RX ORDER — THIAMINE HCL 100 MG
100 TABLET ORAL DAILY
Status: DISCONTINUED | OUTPATIENT
Start: 2022-01-28 | End: 2022-01-30 | Stop reason: HOSPADM

## 2022-01-28 RX ORDER — IPRATROPIUM BROMIDE AND ALBUTEROL SULFATE 2.5; .5 MG/3ML; MG/3ML
SOLUTION RESPIRATORY (INHALATION)
Status: DISCONTINUED
Start: 2022-01-28 | End: 2022-01-28 | Stop reason: WASHOUT

## 2022-01-28 RX ORDER — ESMOLOL HYDROCHLORIDE 20 MG/ML
0-300 INJECTION, SOLUTION INTRAVENOUS CONTINUOUS
Status: DISCONTINUED | OUTPATIENT
Start: 2022-01-28 | End: 2022-01-28

## 2022-01-28 RX ORDER — AMLODIPINE BESYLATE 10 MG/1
10 TABLET ORAL DAILY
Status: DISCONTINUED | OUTPATIENT
Start: 2022-01-29 | End: 2022-01-28

## 2022-01-28 RX ORDER — DILTIAZEM HCL 1 MG/ML
0-15 INJECTION, SOLUTION INTRAVENOUS CONTINUOUS
Status: DISCONTINUED | OUTPATIENT
Start: 2022-01-28 | End: 2022-01-29

## 2022-01-28 RX ORDER — DILTIAZEM HYDROCHLORIDE 5 MG/ML
15 INJECTION INTRAVENOUS
Status: COMPLETED | OUTPATIENT
Start: 2022-01-28 | End: 2022-01-28

## 2022-01-28 RX ORDER — DILTIAZEM HYDROCHLORIDE 30 MG/1
30 TABLET, FILM COATED ORAL EVERY 6 HOURS
Status: DISCONTINUED | OUTPATIENT
Start: 2022-01-28 | End: 2022-01-28

## 2022-01-28 RX ORDER — ASPIRIN 325 MG
325 TABLET ORAL
Status: COMPLETED | OUTPATIENT
Start: 2022-01-28 | End: 2022-01-28

## 2022-01-28 RX ORDER — PREDNISONE 20 MG/1
40 TABLET ORAL DAILY
Status: DISCONTINUED | OUTPATIENT
Start: 2022-01-28 | End: 2022-01-30 | Stop reason: HOSPADM

## 2022-01-28 RX ORDER — FUROSEMIDE 10 MG/ML
40 INJECTION INTRAMUSCULAR; INTRAVENOUS ONCE
Status: DISCONTINUED | OUTPATIENT
Start: 2022-01-28 | End: 2022-01-28

## 2022-01-28 RX ORDER — ALBUTEROL SULFATE 2.5 MG/.5ML
4 SOLUTION RESPIRATORY (INHALATION)
Status: DISCONTINUED | OUTPATIENT
Start: 2022-01-28 | End: 2022-01-28

## 2022-01-28 RX ORDER — LORAZEPAM 2 MG/ML
2 INJECTION INTRAMUSCULAR EVERY 4 HOURS PRN
Status: DISCONTINUED | OUTPATIENT
Start: 2022-01-28 | End: 2022-01-30 | Stop reason: HOSPADM

## 2022-01-28 RX ORDER — OXYCODONE HYDROCHLORIDE 5 MG/1
5 TABLET ORAL ONCE
Status: DISCONTINUED | OUTPATIENT
Start: 2022-01-28 | End: 2022-01-28

## 2022-01-28 RX ADMIN — OXYCODONE 5 MG: 5 TABLET ORAL at 08:01

## 2022-01-28 RX ADMIN — MULTIPLE VITAMINS W/ MINERALS TAB 1 TABLET: TAB at 02:01

## 2022-01-28 RX ADMIN — ATORVASTATIN CALCIUM 40 MG: 40 TABLET, FILM COATED ORAL at 08:01

## 2022-01-28 RX ADMIN — SODIUM CHLORIDE 1000 ML: 0.9 INJECTION, SOLUTION INTRAVENOUS at 07:01

## 2022-01-28 RX ADMIN — IPRATROPIUM BROMIDE 1 MG: 0.5 SOLUTION RESPIRATORY (INHALATION) at 04:01

## 2022-01-28 RX ADMIN — LEVALBUTEROL HYDROCHLORIDE 1.25 MG: 1.25 SOLUTION, CONCENTRATE RESPIRATORY (INHALATION) at 03:01

## 2022-01-28 RX ADMIN — FOLIC ACID 1 MG: 1 TABLET ORAL at 08:01

## 2022-01-28 RX ADMIN — DILTIAZEM HYDROCHLORIDE 22.5 MG: 5 INJECTION INTRAVENOUS at 07:01

## 2022-01-28 RX ADMIN — FUROSEMIDE 40 MG: 10 INJECTION, SOLUTION INTRAMUSCULAR; INTRAVENOUS at 12:01

## 2022-01-28 RX ADMIN — LEVALBUTEROL HYDROCHLORIDE 1.25 MG: 1.25 SOLUTION, CONCENTRATE RESPIRATORY (INHALATION) at 11:01

## 2022-01-28 RX ADMIN — ENOXAPARIN SODIUM 40 MG: 100 INJECTION SUBCUTANEOUS at 08:01

## 2022-01-28 RX ADMIN — PREDNISONE 40 MG: 20 TABLET ORAL at 08:01

## 2022-01-28 RX ADMIN — DILTIAZEM HYDROCHLORIDE 15 MG: 5 INJECTION INTRAVENOUS at 07:01

## 2022-01-28 RX ADMIN — IOHEXOL 75 ML: 350 INJECTION, SOLUTION INTRAVENOUS at 06:01

## 2022-01-28 RX ADMIN — CYANOCOBALAMIN TAB 250 MCG 250 MCG: 250 TAB at 02:01

## 2022-01-28 RX ADMIN — ASPIRIN 325 MG ORAL TABLET 325 MG: 325 PILL ORAL at 05:01

## 2022-01-28 RX ADMIN — METHOCARBAMOL 500 MG: 500 TABLET ORAL at 02:01

## 2022-01-28 RX ADMIN — DOXYCYCLINE HYCLATE 100 MG: 100 TABLET, COATED ORAL at 07:01

## 2022-01-28 RX ADMIN — THIAMINE HCL TAB 100 MG 100 MG: 100 TAB at 02:01

## 2022-01-28 RX ADMIN — ALBUTEROL SULFATE 10 MG: 2.5 SOLUTION RESPIRATORY (INHALATION) at 04:01

## 2022-01-28 NOTE — ASSESSMENT & PLAN NOTE
- Per chart review; >5 yrs ago  - not on AEDs  - Seizure precautions  - Denies it was due to alcohol withdrawal

## 2022-01-28 NOTE — CONSULTS
Pt evaluated at bedside and is to be admitted to MICU. Full H&P to follow.     Jigna Anna D.O  Internal Medicine PGY-2  [facility]

## 2022-01-28 NOTE — MEDICAL/APP STUDENT
History     Chief Complaint   Patient presents with    Shortness of Breath     Starting yesterday and worsened overnight, hx: COPD. Sating 79% on 2L home O2 upon EMS arrival. Pt given duoneb and 8 mg dexamethasone with EMS     Baltazar Mccray is a 61-year-old male with history of hypertension and COPD presents via EMS for shortness of breath. Per EMS, was given decadron and duonebs enroute to ED with minimal improvement. The patient states he had difficulty breathing starting yesterday afternoon and progressively worsened overnight, especially worse when lying down. He increased his home oxygen from 2L to 3L last night, but his Sp02 remained at 79%. The patient states he ran out of his albuterol prescription yesterday, which he normally takes 5-6x per day. He was last hospitalized for COPD exacerbation on 1/15, with 10+ hospitalizations in the last year. He has been unable to get follow-up appt with his pulmonologist.     Patient also reports running out of his blood pressure pill yesterday.    He denies any increased cough, increased sputum, fever, chills, chest pain, or leg pain. Denies any recent drug or alcohol use. Quit smoking 4 months ago.    Past Medical History:   Diagnosis Date    Asthma     COPD (chronic obstructive pulmonary disease)     home O2 at night only    Coronary artery disease     Hypertension     Pneumonia     Seizures        Past Surgical History:   Procedure Laterality Date    LEFT HEART CATHETERIZATION  4/23/2020    Procedure: Left heart cath;  Surgeon: Nic Pollack MD;  Location: [facility] CATH LAB;  Service: Cardiology;;       Family History   Problem Relation Age of Onset    Cancer Father     Hypertension Sister     Stroke Sister     Stroke Brother     Diabetes Neg Hx     Heart disease Neg Hx        Social History     Tobacco Use    Smoking status: Current Some Day Smoker     Packs/day: 0.25     Types: Cigarettes    Smokeless tobacco: Never Used    Tobacco comment: 3-4  per day   Substance Use Topics    Alcohol use: Yes     Alcohol/week: 2.0 standard drinks     Types: 2 Cans of beer per week     Comment: every night    Drug use: No       Review of Systems   Constitutional: Negative for chills and fever.   HENT: Negative for congestion, ear discharge, sinus pressure and sore throat.    Eyes: Negative for discharge and redness.   Respiratory: Positive for cough, shortness of breath and wheezing. Negative for chest tightness.    Cardiovascular: Negative for chest pain, palpitations and leg swelling.   Gastrointestinal: Negative for abdominal pain, diarrhea and nausea.   Genitourinary: Negative for difficulty urinating and flank pain.   Musculoskeletal: Negative for back pain and myalgias.   Neurological: Negative for light-headedness and headaches.       Physical Exam   /88   Pulse (!) 122   Temp 99.3 °F (37.4 °C) (Oral)   Resp (!) 30   SpO2 100%     Physical Exam    Constitutional: He appears well-developed and well-nourished. He appears distressed.   HENT:   Head: Normocephalic and atraumatic.   Eyes: EOM are normal. Pupils are equal, round, and reactive to light.   Cardiovascular: Regular rhythm and normal heart sounds. Exam reveals no friction rub.    No murmur heard.  Sinus tachycardic   Pulmonary/Chest: He is in respiratory distress. He has wheezes. He exhibits no tenderness.   Abdominal: Abdomen is soft. Bowel sounds are normal.     Neurological: He is alert and oriented to person, place, and time.   Skin: Skin is warm and dry.   Psychiatric: He has a normal mood and affect. Thought content normal.       Admission on 01/28/2022   Component Date Value    POC Rapid COVID 01/28/2022 Negative      Acceptab* 01/28/2022 Yes     WBC 01/28/2022 10.78     RBC 01/28/2022 5.10     Hemoglobin 01/28/2022 13.0*    Hematocrit 01/28/2022 43.0     MCV 01/28/2022 84     MCH 01/28/2022 25.5*    MCHC 01/28/2022 30.2*    RDW 01/28/2022 19.8*    Platelets  01/28/2022 311     MPV 01/28/2022 10.3     Immature Granulocytes 01/28/2022 0.6*    Gran # (ANC) 01/28/2022 6.4     Immature Grans (Abs) 01/28/2022 0.07*    Lymph # 01/28/2022 3.2     Mono # 01/28/2022 0.9     Eos # 01/28/2022 0.2     Baso # 01/28/2022 0.04     nRBC 01/28/2022 0     Gran % 01/28/2022 59.7     Lymph % 01/28/2022 29.4     Mono % 01/28/2022 8.0     Eosinophil % 01/28/2022 1.9     Basophil % 01/28/2022 0.4     Differential Method 01/28/2022 Automated     Sodium 01/28/2022 141     Potassium 01/28/2022 3.8     Chloride 01/28/2022 98     CO2 01/28/2022 30*    Glucose 01/28/2022 89     BUN 01/28/2022 7     Creatinine 01/28/2022 0.8     Calcium 01/28/2022 8.1*    Total Protein 01/28/2022 7.2     Albumin 01/28/2022 3.2*    Total Bilirubin 01/28/2022 0.4     Alkaline Phosphatase 01/28/2022 94     AST 01/28/2022 48*    ALT 01/28/2022 91*    Anion Gap 01/28/2022 13     eGFR if African American 01/28/2022 >60.0     eGFR if non African Amer* 01/28/2022 >60.0     Troponin I 01/28/2022 0.169*    BNP 01/28/2022 1,208*    POC PH 01/28/2022 7.346*    POC PCO2 01/28/2022 72.9*    POC PO2 01/28/2022 87*    POC HCO3 01/28/2022 39.8*    POC BE 01/28/2022 14     POC SATURATED O2 01/28/2022 95     POC TCO2 01/28/2022 42*    Sample 01/28/2022 VENOUS     Site 01/28/2022 Other     Allens Test 01/28/2022 N/A      Imaging Results          CTA Chest Non-Coronary (PE Study) (Final result)  Result time 01/28/22 06:22:43    Final result by Alec Griffiths MD (01/28/22 06:22:43)                 Impression:      1. Examination considered not blastic to the proximal segmental pulmonary arterial level without convincing evidence of acute pulmonary embolism.  2. No acute intrathoracic findings identified.  3. Appearance of the lungs without substantial interval change when compared to most recent CTA chest performed 12/21/2021.  Redemonstrated emphysema with areas of architectural distortion  nonspecific ground-glass attenuation.  Stable appearance of 12-13 mm nodule in the left lower lobe.  For multiple solid nodules with any 6 mm or greater, Fleischner Society guidelines recommend follow up with non-contrast chest CT at 3-6 months and 18-24 months after discovery.  4. Additional details, as provided in the body report.      Electronically signed by: Alec Griffiths  Date:    01/28/2022  Time:    06:22             Narrative:    EXAMINATION:  CTA CHEST NON CORONARY    CLINICAL HISTORY:  Pulmonary embolism (PE) suspected, positive D-dimer;    TECHNIQUE:  Low dose axial images, sagittal and coronal reformations were obtained from the thoracic inlet to the lung bases following the IV administration of 75 mL of Omnipaque 350.  Contrast timing was optimized to evaluate the pulmonary arteries.  MIP images were performed.    COMPARISON:  CTA chest performed 12/21/2021.    FINDINGS:  Exam quality: Examination considered diagnostic to the proximal segmental pulmonary arterial level.    Pulmonary embolism: No acute or chronic pulmonary embolism.    Central pulmonary arteries: Normal caliber.    Aorta: Normal caliber.  Atherosclerotic calcification.    Heart: No right heart strain. Normal size.    Coronary arteries: Limited assessment.    Pericardium: Normal. No effusion, thickening, or calcification.    Support tubes and lines: None.    Base of neck/thyroid: Normal.    Lymph nodes: No supraclavicular, axillary, internal mammary, mediastinal, or hilar adenopathy.    Esophagus: Normal.    Pleura: No effusion, thickening, or calcification.    Upper abdomen: Unremarkable    Body wall: Unremarkable    Airways: Central airways are patent.  Areas of relatively smooth bronchial thickening bilaterally, most conspicuous in the lower lobes.    Lungs: Assessment of the lungs is limited due to respiratory motion.  Emphysematous changes are again appreciated.  Atelectasis and scar is noted.  Overall there appears to be no  substantial interval change relative to most recent CTA chest performed 12/21/2021.  Stable appearance of 12-13 mm nodule in the left lower lobe (series 3, image 355).  Multiple additional smaller sub 4 mm nodules in both lungs also unchanged additional areas of ground-glass attenuation and reticulation similar to prior.    Bones: No focal lesion.                               X-Ray Chest AP Portable (Final result)  Result time 01/28/22 04:47:55    Final result by Antony Samayoa MD (01/28/22 04:47:55)                 Impression:      No acute findings.    No significant change from prior study.      Electronically signed by: Antony Samayoa MD  Date:    01/28/2022  Time:    04:47             Narrative:    EXAMINATION:  XR CHEST AP PORTABLE    CLINICAL HISTORY:  shortness of breath;    TECHNIQUE:  Single frontal view of the chest was performed.    COMPARISON:  01/15/2022.    FINDINGS:  Chronic increased markings in the lungs, similar to prior.    Heart and lungs  appear unchanged when allowing for differences in technique and positioning.                                  ED Course     Differentials: COPD exacerbation, pulmonary embolism, COVID-19, pneumonia, pneumothorax    61-year-old male with history of hypertension and COPD who presents for SOB.  Per EMS, given 8 of decadron and duonebs enroute to ED. Patient appears in respiratory distress, but able to speak in complete sentences. He was administered duonebs and placed on BiPAP on arrival, with symptomatic improvement and Sp02 >99%. Patient states difficulty breathing began yesterday after being unable to refill his albuterol inhaler. CXR unremarkable and EKG significant for sinus tachycardia. VBG suggestive of chronic CO2 retention, with elevated CO2 and bicarbonate, and pH at 7.345. Given BNP significantly elevated at 1208 and D-dimer of 0.67, CTA chest was ordered and negative for pulmonary embolism.  Will likely admit to hospital medicine for further  monitoring.     Dispo: Admit

## 2022-01-28 NOTE — ASSESSMENT & PLAN NOTE
Mr. Mccray is a 60 yo M with HTN, COPD on home 2 L NC, current tobacco abuse, Hx of seizure disorder,  chronic diastolic heart failure, mild alcohol use disorder presents for worsening shortness of breath for 1 week.    Patient with Hypercapnic and Hypoxic Respiratory failure which is Acute on chronic.  he is on home oxygen at 2 LPM. Supplemental oxygen was provided and noted- Oxygen Concentration (%):  [30] 30.   Signs/symptoms of respiratory failure include- tachypnea, increased work of breathing and respiratory distress. Contributing diagnoses includes - COPD Labs and images were reviewed. Patient Has recent ABG, which has been reviewed. Will treat underlying causes and adjust management of respiratory failure as follows    Differential includes but not limited to COPD exacerbation vs acute HF exacerbation vs CAP PNA, in the setting of medication non-adherence. Likely a component of all 3. Low suspicion for PE given CTA is negative.      - Prednisone 40mg PO daily to complete a 5 day course  - Levaquin 750mg PO daily to complete a 5 day course  - Xopenex q4h while awake and prn with IS  - Repeat VBG showed improvement of pCO2 to 64.5 from 72.9 after BiPAP usage   - Wean oxygen to 2 L NC and BiPAP QHS   - Will need close follow up with PCP and pulmonology   - will need medications delivered at bedside upon discharge to assure adherence

## 2022-01-28 NOTE — ED NOTES
Pt requesting to eat; no orders for diet; spoke with Dr. Peralta and states if patient can transition to NC/Bubble flow then he can eat but if not and needs to remain on BiPAP, he is to remain NPO

## 2022-01-28 NOTE — ASSESSMENT & PLAN NOTE
Home regimen: amlodipine 10 mg QD     Plan:   - Holding home regimen for now, pending official TTE, if depressed EF will consider BB  - Echo was done at bedside with normal EF, diltiazem was given with good response   - Most recent TTE done in 5/21 with EF of 55%; however, TTE done in 3/20 with EF of 10%  - Lopressor 5 mg IV q5min PRN for 3 doses for HR >140   - Xopenex instead of albuterol to reduce tachycardia

## 2022-01-28 NOTE — ASSESSMENT & PLAN NOTE
- Exerts 1-2 cigarettes per day, started smoking since 19 yoa, was smoking more 1-2 packs per day

## 2022-01-28 NOTE — PROGRESS NOTES
61-year-old male with longstanding tobacco use (says he quit 2 months ago) and chronic hypoxemic respiratory failure 2/2 COPD on 2L home O2, CAD, seizure disorder and alcohol abuse here with COPD exacerbation and heart failure    1. Acute on chronic hypoxic respiratory failure:  CTA with scattered GGO and emphysema, no PE.  BNP elevated. COPD as noted.  On course of levaquin.  Aggressive diuresis with strict I/O    2. AFib with RVR:  appears to be new onset.  POCUS with good EF.  Now on Dilt drip.    3. Elevated troponin:  Likely combination of RVR and volume overload in the setting of acute illness.  Trended down.  ECG with AFib. Formal ECHO ordered    4. COPD with acute exacerbation:  On 2 L home O2.  FEV1 27% but also moderate restriction on PFTs.  Prednisone for 5 days.  Levalbuterol scheduled.  Needs triple therapy on discharge and follow-up in pulmonary clinic    5. Hx Alcohol abuse:  ETOH level 52. Mild AST/ALT elevation, possibly passive congestion.  On thiamine, multivitamin, and folic acid.  Abdominal ultrasound ordered.  CIWA protocol if needed.      Critical Care Time: 45 minutes  Critical secondary to Patient has a condition that poses threat to life and bodily function:  severe respiratory distress    Critical care was time spent personally by me on the following activities: evaluating this patient's organ dysfunction, development of treatment plan, discussing treatment plan with patient or surrogate and bedside caregivers, discussions with consultants, evaluation of patient's response to treatment, examination of patient, ordering and performing treatments and interventions, ordering and review of laboratory studies, ordering and review of radiographic studies, re-evaluation of patient's condition. This critical care time did not overlap with that of any other provider or involve time for any procedures.

## 2022-01-28 NOTE — ASSESSMENT & PLAN NOTE
- Vitals q4h while awake  - Start Vitamin supplementation- Thiamine, Folic acid, Vit. B12, and Multivitamin qd  - Reports drinking 1-2 beers per day, denies any auditory or visual hallucination or hx of alcohol withdrawal seizure   - Ativan 2mg q4 PRN if : CIWA >8

## 2022-01-28 NOTE — ASSESSMENT & PLAN NOTE
- Rate control with target HR <110  - Hold for SBP <90/60 or P<45 bpm  - Patient's [id number] score is 3, annual risk of stroke is 2.9  - Magnesium > 2, Potassium > 4  - Continuous telemetry  - TTE given new onset  - Synchronized cardioversion if hemodynamically unstable  - Was given diltiazem IV pushes with good response; has not required diltiazem gtt that was ordered

## 2022-01-28 NOTE — ED PROVIDER NOTES
Encounter Date: 1/28/2022       History     Chief Complaint   Patient presents with    Shortness of Breath     Starting yesterday and worsened overnight, hx: COPD. Sating 79% on 2L home O2 upon EMS arrival. Pt given duoneb and 8 mg dexamethasone with EMS     60 yo M with pmhx COPD on 2L home O2, CAD, HTN, seizure disorder, EtOH use presents via EMS with a chief complaint of SOB. Worsening x 1 week, but more severe for the past 2 days. Ran out of albuterol yesterday. No cough. No CP. History of multiple previous admissions for COPD including this month.  Patient denies any ICU admissions or intubations.  He reported quitting smoking crack 20 years ago and smoking tobacco 4 months ago.  EMS reported hypoxia 77% at home. He denies relief with duonebs administered by EMS. EMS also administered decadron 8mg. No immobilization or recent travel. No history of VTE. No exogenous hormones.        Review of patient's allergies indicates:   Allergen Reactions    Benazepril Swelling    Duoneb [ipratropium-albuterol]      Report minor Tremors, pt seems to be tolerating well.     Past Medical History:   Diagnosis Date    Asthma     COPD (chronic obstructive pulmonary disease)     home O2 at night only    Coronary artery disease     Hypertension     Pneumonia     Seizures      Past Surgical History:   Procedure Laterality Date    LEFT HEART CATHETERIZATION  4/23/2020    Procedure: Left heart cath;  Surgeon: Nic Pollack MD;  Location: [facility] CATH LAB;  Service: Cardiology;;     Family History   Problem Relation Age of Onset    Cancer Father     Hypertension Sister     Stroke Sister     Stroke Brother     Diabetes Neg Hx     Heart disease Neg Hx      Social History     Tobacco Use    Smoking status: Current Some Day Smoker     Packs/day: 0.25     Types: Cigarettes    Smokeless tobacco: Never Used    Tobacco comment: 3-4 per day   Substance Use Topics    Alcohol use: Yes     Alcohol/week: 2.0 standard drinks      Types: 2 Cans of beer per week     Comment: every night    Drug use: No     Review of Systems   Constitutional: Negative for fever.   HENT: Negative for sore throat.    Respiratory: Positive for shortness of breath. Negative for cough.    Cardiovascular: Negative for chest pain.   Gastrointestinal: Negative for nausea.   Genitourinary: Negative for dysuria.   Musculoskeletal: Negative for back pain.   Skin: Negative for rash.   Neurological: Negative for weakness.   Hematological: Does not bruise/bleed easily.       Physical Exam     Initial Vitals [01/28/22 0410]   BP Pulse Resp Temp SpO2   (!) 206/127 (!) 124 (!) 22 99.3 °F (37.4 °C) 99 %      MAP       --         Physical Exam    Nursing note and vitals reviewed.  Constitutional: He appears well-developed and well-nourished. He is not diaphoretic. He appears distressed.   HENT:   Head: Normocephalic and atraumatic.   Eyes: No scleral icterus.   Neck: Neck supple. No JVD present.   Normal range of motion.  Cardiovascular: Regular rhythm and normal heart sounds. Tachycardia present.  Exam reveals no gallop and no friction rub.    No murmur heard.  Pulmonary/Chest: He is in respiratory distress. He has wheezes. He has no rhonchi. He has no rales. He exhibits no tenderness.   Tachypneic on nebulizer speaking in 5-6 word sentences, tight wheezing throughout   Abdominal: Abdomen is soft. He exhibits no distension and no mass. There is no abdominal tenderness. There is no rebound and no guarding.   Musculoskeletal:         General: No tenderness or edema. Normal range of motion.      Cervical back: Normal range of motion and neck supple.      Comments: No calf asymmetry, cords, edema, tenderness     Neurological: He is alert and oriented to person, place, and time. He has normal strength. No sensory deficit.   Skin: Skin is warm and dry. Capillary refill takes less than 2 seconds.   Psychiatric: He has a normal mood and affect.         ED Course   Procedures  Labs  Reviewed   CBC W/ AUTO DIFFERENTIAL - Abnormal; Notable for the following components:       Result Value    Hemoglobin 13.0 (*)     MCH 25.5 (*)     MCHC 30.2 (*)     RDW 19.8 (*)     Immature Granulocytes 0.6 (*)     Immature Grans (Abs) 0.07 (*)     All other components within normal limits   COMPREHENSIVE METABOLIC PANEL - Abnormal; Notable for the following components:    CO2 30 (*)     Calcium 8.1 (*)     Albumin 3.2 (*)     AST 48 (*)     ALT 91 (*)     All other components within normal limits   TROPONIN I - Abnormal; Notable for the following components:    Troponin I 0.169 (*)     All other components within normal limits   B-TYPE NATRIURETIC PEPTIDE - Abnormal; Notable for the following components:    BNP 1,208 (*)     All other components within normal limits   D DIMER, QUANTITATIVE - Abnormal; Notable for the following components:    D-Dimer 0.67 (*)     All other components within normal limits   ISTAT PROCEDURE - Abnormal; Notable for the following components:    POC PH 7.346 (*)     POC PCO2 72.9 (*)     POC PO2 87 (*)     POC HCO3 39.8 (*)     POC TCO2 42 (*)     All other components within normal limits   TROPONIN I   SARS-COV-2 RDRP GENE    Narrative:     This test utilizes isothermal nucleic acid amplification   technology to detect the SARS-CoV-2 RdRp nucleic acid segment.   The analytical sensitivity (limit of detection) is 125 genome   equivalents/mL.   A POSITIVE result implies infection with the SARS-CoV-2 virus;   the patient is presumed to be contagious.     A NEGATIVE result means that SARS-CoV-2 nucleic acids are not   present above the limit of detection. A NEGATIVE result should be   treated as presumptive. It does not rule out the possibility of   COVID-19 and should not be the sole basis for treatment decisions.   If COVID-19 is strongly suspected based on clinical and exposure   history, re-testing using an alternate molecular assay should be   considered.   This test is only for use  under the Food and Drug   Administration s Emergency Use Authorization (EUA).   Commercial kits are provided by [employer].   Performance characteristics of the EUA have been independently   verified by [facility] Department of   Pathology and Laboratory Medicine.   _________________________________________________________________   The authorized Fact Sheet for Healthcare Providers and the authorized Fact   Sheet for Patients of the ID NOW COVID-19 are available on the FDA   website:     https://www.fda.gov/media/488579/download  https://www.fda.gov/media/251682/download         EKG Readings: (Independently Interpreted)   Initial Reading: No STEMI. Rhythm: Sinus Tachycardia. Heart Rate: 124. ST Segments: Normal ST Segments. T Waves: Normal. Axis: Normal.       Imaging Results          CTA Chest Non-Coronary (PE Study) (Final result)  Result time 01/28/22 06:22:43    Final result by Alec Griffiths MD (01/28/22 06:22:43)                 Impression:      1. Examination considered not blastic to the proximal segmental pulmonary arterial level without convincing evidence of acute pulmonary embolism.  2. No acute intrathoracic findings identified.  3. Appearance of the lungs without substantial interval change when compared to most recent CTA chest performed 12/21/2021.  Redemonstrated emphysema with areas of architectural distortion nonspecific ground-glass attenuation.  Stable appearance of 12-13 mm nodule in the left lower lobe.  For multiple solid nodules with any 6 mm or greater, Fleischner Society guidelines recommend follow up with non-contrast chest CT at 3-6 months and 18-24 months after discovery.  4. Additional details, as provided in the body report.      Electronically signed by: Alec Griffiths  Date:    01/28/2022  Time:    06:22             Narrative:    EXAMINATION:  CTA CHEST NON CORONARY    CLINICAL HISTORY:  Pulmonary embolism (PE) suspected, positive D-dimer;    TECHNIQUE:  Low  dose axial images, sagittal and coronal reformations were obtained from the thoracic inlet to the lung bases following the IV administration of 75 mL of Omnipaque 350.  Contrast timing was optimized to evaluate the pulmonary arteries.  MIP images were performed.    COMPARISON:  CTA chest performed 12/21/2021.    FINDINGS:  Exam quality: Examination considered diagnostic to the proximal segmental pulmonary arterial level.    Pulmonary embolism: No acute or chronic pulmonary embolism.    Central pulmonary arteries: Normal caliber.    Aorta: Normal caliber.  Atherosclerotic calcification.    Heart: No right heart strain. Normal size.    Coronary arteries: Limited assessment.    Pericardium: Normal. No effusion, thickening, or calcification.    Support tubes and lines: None.    Base of neck/thyroid: Normal.    Lymph nodes: No supraclavicular, axillary, internal mammary, mediastinal, or hilar adenopathy.    Esophagus: Normal.    Pleura: No effusion, thickening, or calcification.    Upper abdomen: Unremarkable    Body wall: Unremarkable    Airways: Central airways are patent.  Areas of relatively smooth bronchial thickening bilaterally, most conspicuous in the lower lobes.    Lungs: Assessment of the lungs is limited due to respiratory motion.  Emphysematous changes are again appreciated.  Atelectasis and scar is noted.  Overall there appears to be no substantial interval change relative to most recent CTA chest performed 12/21/2021.  Stable appearance of 12-13 mm nodule in the left lower lobe (series 3, image 355).  Multiple additional smaller sub 4 mm nodules in both lungs also unchanged additional areas of ground-glass attenuation and reticulation similar to prior.    Bones: No focal lesion.                               X-Ray Chest AP Portable (Final result)  Result time 01/28/22 04:47:55    Final result by Antony Samayoa MD (01/28/22 04:47:55)                 Impression:      No acute findings.    No significant  change from prior study.      Electronically signed by: Antony Samayoa MD  Date:    01/28/2022  Time:    04:47             Narrative:    EXAMINATION:  XR CHEST AP PORTABLE    CLINICAL HISTORY:  shortness of breath;    TECHNIQUE:  Single frontal view of the chest was performed.    COMPARISON:  01/15/2022.    FINDINGS:  Chronic increased markings in the lungs, similar to prior.    Heart and lungs  appear unchanged when allowing for differences in technique and positioning.                                 Medications   sodium chloride 0.9% bolus 1,000 mL (has no administration in time range)   ipratropium (ATROVENT) 0.02 % nebulizer solution (1 mg  Given 1/28/22 0432)   albuterol sulfate nebulizer solution 10 mg (10 mg Nebulization Given 1/28/22 0433)   aspirin tablet 325 mg (325 mg Oral Given 1/28/22 0844)   iohexoL (OMNIPAQUE 350) injection 75 mL (75 mLs Intravenous Given 1/28/22 0611)     Medical Decision Making:   History:   I obtained history from: EMS provider.  Old Medical Records: I decided to obtain old medical records.  Initial Assessment:   60 yo M with pmhx COPD on 2L home O2, CAD, HTN, seizure disorder, EtOH use presents via EMS with a chief complaint of SOB.   Differential Diagnosis:   This patient's differential diagnosis includes, but is not limited to:  COVID-19, COPD exacerbation, pneumothorax, pneumonia, heart failure, PE, ACS, reactive airway disease, foreign body aspiration, viral infections.      Clinical Tests:   Lab Tests: Ordered  Radiological Study: Ordered  Medical Tests: Ordered  ED Management:  Patient reported no relief nebulizers in route.  He already received Decadron.  Will place on BiPAP for work of breathing and continue nebulizers.  Will obtain labs and chest x-ray.    Reassessment:  Patient appears much more comfortable on BiPAP and reports feeling improved.  Hypertension also resolved to 145/90.  He has increased air movement and is speaking complete sentences on BiPAP.  VBG  reveals hypercapnia of 7.29 the patient is a chronic retainer with a bicarb of 40 and his pH is not significantly decreased at 7.346.  Cbc without leukocytosis.  CMP with mild elevation in LFTs.  Troponin is positive at 0.169.  Again, patient denies chest pain I feel this is troponin leak and not acute thrombotic ACS.  Will trend troponin and administer aspirin.  BNP is elevated at 1208. Bedside ultrasound is limited secondary to BiPAP but I do not see any significant LV dysfunction.  No significant right heart strain.  IVC has respiratory variation.  I am uncertain the etiology of this elevated BNP.  Will obtain a D-dimer to investigate for possible PE.  Chest x-ray without acute process.  Will continue to monitor.      Reassessment: D-dimer slightly positive, will obtain CT to rule out PE.    Reassessment:  CTA limited secondary to bolus timing.  Patient remains dyspneic on nasal cannula and will return to BiPAP.  MICU consulted for admission.            Attending Attestation:         Attending Critical Care:   Critical Care Times:   Direct Patient Care (initial evaluation, reassessments, and time considering the case)................................................................15 minutes.   Additional History from reviewing old medical records or taking additional history from the family, EMS, PCP, etc.......................5 minutes.   Ordering, Reviewing, and Interpreting Diagnostic Studies...............................................................................................................5 minutes.   Documentation..................................................................................................................................................................................5 minutes.   Consultation with other Physicians. .................................................................................................................................................5 minutes.    ==============================================================  · Total Critical Care Time - exclusive of procedural time: 35 minutes.  ==============================================================  Critical care was necessary to treat or prevent imminent or life-threatening deterioration of the following conditions: respiratory failure.                    Clinical Impression:   Final diagnoses:  [R06.02] Shortness of breath  [J44.1] COPD with acute exacerbation (Primary)  [J96.02] Acute respiratory failure with hypercapnia  [R77.8] Troponin level elevated          ED Disposition Condition    Admit               Chele Stroud MD  01/28/22 0652

## 2022-01-28 NOTE — ASSESSMENT & PLAN NOTE
Likely in the setting of increased demand, tachycardia. Down-trended. Asymptomatic and no EKG changes

## 2022-01-28 NOTE — ED NOTES
Patient c/o cramping and more SOB.  Critical care team notified and states OK to put patient back on the bipap if he is more comfortable. Awaiting orders

## 2022-01-28 NOTE — SUBJECTIVE & OBJECTIVE
Past Medical History:   Diagnosis Date    Asthma     COPD (chronic obstructive pulmonary disease)     home O2 at night only    Coronary artery disease     Hypertension     Pneumonia     Seizures        Past Surgical History:   Procedure Laterality Date    LEFT HEART CATHETERIZATION  4/23/2020    Procedure: Left heart cath;  Surgeon: Nic Pollack MD;  Location: [facility] CATH LAB;  Service: Cardiology;;       Review of patient's allergies indicates:   Allergen Reactions    Benazepril Swelling    Duoneb [ipratropium-albuterol]      Report minor Tremors, pt seems to be tolerating well.       Family History     Problem Relation (Age of Onset)    Cancer Father    Hypertension Sister    Stroke Sister, Brother        Tobacco Use    Smoking status: Current Some Day Smoker     Packs/day: 0.25     Types: Cigarettes    Smokeless tobacco: Never Used    Tobacco comment: 3-4 per day   Substance and Sexual Activity    Alcohol use: Yes     Alcohol/week: 2.0 standard drinks     Types: 2 Cans of beer per week     Comment: every night    Drug use: No    Sexual activity: Not Currently      Review of Systems   Constitutional: Positive for chills and fever (subjective ). Negative for appetite change.   HENT: Negative for sore throat.    Respiratory: Positive for shortness of breath.    Cardiovascular: Positive for palpitations. Negative for chest pain.   Gastrointestinal: Negative for abdominal distention, abdominal pain, constipation, diarrhea, nausea and vomiting.   Endocrine: Negative for polydipsia and polyuria.   Genitourinary: Negative for dysuria, hematuria and urgency.   Musculoskeletal: Negative for back pain and gait problem.   Neurological: Negative for weakness and numbness.     Objective:     Vital Signs (Most Recent):  Temp: 99.3 °F (37.4 °C) (01/28/22 0410)  Pulse: 99 (01/28/22 1521)  Resp: (!) 22 (01/28/22 1521)  BP: (!) 153/82 (01/28/22 1403)  SpO2: 96 % (01/28/22 1521) Vital Signs (24h Range):  Temp:   [99.3 °F (37.4 °C)] 99.3 °F (37.4 °C)  Pulse:  [] 99  Resp:  [19-30] 22  SpO2:  [87 %-100 %] 96 %  BP: (116-206)/() 153/82   Weight: 63.5 kg (139 lb 15.9 oz)  Body mass index is 21.29 kg/m².      Intake/Output Summary (Last 24 hours) at 1/28/2022 1546  Last data filed at 1/28/2022 1357  Gross per 24 hour   Intake --   Output 1350 ml   Net -1350 ml       Physical Exam  Constitutional:       Appearance: Normal appearance.   HENT:      Head: Normocephalic and atraumatic.      Nose: No congestion or rhinorrhea.   Eyes:      General:         Right eye: No discharge.         Left eye: No discharge.   Cardiovascular:      Rate and Rhythm: Tachycardia present. Rhythm irregular.      Pulses: Normal pulses.      Heart sounds: Normal heart sounds.   Pulmonary:      Effort: Respiratory distress present.      Breath sounds: Normal breath sounds. No rales.   Abdominal:      General: Abdomen is flat.      Palpations: Abdomen is soft.   Musculoskeletal:         General: No swelling, tenderness or deformity.      Cervical back: Normal range of motion. No muscular tenderness.      Right lower leg: No edema.      Left lower leg: No edema.   Skin:     General: Skin is warm and dry.      Capillary Refill: Capillary refill takes less than 2 seconds.   Neurological:      General: No focal deficit present.      Mental Status: He is alert and oriented to person, place, and time.         Vents:  Oxygen Concentration (%): 30 (01/28/22 1445)  Lines/Drains/Airways     Peripheral Intravenous Line                 Peripheral IV - Single Lumen 01/28/22 0415 20 G Left Hand <1 day         Peripheral IV - Single Lumen 01/28/22 0729 18 G Right Antecubital <1 day              Significant Labs:    CBC/Anemia Profile:  Recent Labs   Lab 01/28/22 0428   WBC 10.78   HGB 13.0*   HCT 43.0      MCV 84   RDW 19.8*        Chemistries:  Recent Labs   Lab 01/28/22 0428      K 3.8   CL 98   CO2 30*   BUN 7   CREATININE 0.8   CALCIUM 8.1*    ALBUMIN 3.2*   PROT 7.2   BILITOT 0.4   ALKPHOS 94   ALT 91*   AST 48*       All pertinent labs within the past 24 hours have been reviewed.    Significant Imaging: I have reviewed all pertinent imaging results/findings within the past 24 hours.

## 2022-01-28 NOTE — ASSESSMENT & PLAN NOTE
- Repeat TTE pending   - Exerts orthopnea, PND, REES   - Lasix 40 mg BID   - Strict I/O    Recent Labs   Lab 01/28/22  8591   BNP 1,208*

## 2022-01-28 NOTE — H&P
[facility] - Emergency Dept  Critical Care Medicine  History & Physical    Patient Name: Baltazar Mccray  MRN: [id number]  Admission Date: 1/28/2022  Hospital Length of Stay: 0 days  Code Status: Full Code  Attending Physician: Chele Stroud MD   Primary Care Provider: Rhonda Of Katia   Principal Problem: <principal problem not specified>    Subjective:     HPI:  Mr. Mccray is a 62 yo M with HTN, COPD on home 2 L NC, current tobacco abuse, Hx of seizure disorder,  chronic diastolic heart failure, mild alcohol use disorder presents for worsening shortness of breath for 1 week.  Per patient, he has been increasing his home oxygen to 5-6 L NC. Also have had multiple admission, ED visits for COPD exacerbation in the past. Also admits that he have not been taking his inhaler, and BP medications as instructed. Associated symptoms of subjective fever, night sweat, and dry cough. Recently, went to an urgent care for similar symptoms and was given prednisone 20 mg which is taking but it is not helping. Unaware of hx of HF but does exert orthopnea, and REES, and weight gain. Denies lower extremities swelling. Pt denies chest pain, sore throat, abdominal pain, n/v/d, constipation, fever, chills, headache, numbness or tingling or weakness of the extremities.     In the ED, /127,  afib with rvr, RR 22, sat 99% on a non-rebreather mask. Labs significant for BNP 1208, troponin 0.169, VBG with pH 7.346, pCO2 72.9, pO2 87. CTA with no acute intrathoracic findings identified, appearance of the lungs without substantial interval change when compared to most recent CTA chest performed 12/21/2021.  Redemonstrated emphysema with areas of architectural distortion nonspecific ground-glass attenuation, stable appearance of 12-13 mm nodule in the left lower lobe. Pt was placed on BiPAP, started on doxycycline, duoneb, diltiazem, lasix 40 mg. Pt is admitted to MICU for higher level care.         Hospital/ICU Course:  No  notes on file     Past Medical History:   Diagnosis Date    Asthma     COPD (chronic obstructive pulmonary disease)     home O2 at night only    Coronary artery disease     Hypertension     Pneumonia     Seizures        Past Surgical History:   Procedure Laterality Date    LEFT HEART CATHETERIZATION  4/23/2020    Procedure: Left heart cath;  Surgeon: Nic Pollack MD;  Location: [facility] CATH LAB;  Service: Cardiology;;       Review of patient's allergies indicates:   Allergen Reactions    Benazepril Swelling    Duoneb [ipratropium-albuterol]      Report minor Tremors, pt seems to be tolerating well.       Family History     Problem Relation (Age of Onset)    Cancer Father    Hypertension Sister    Stroke Sister, Brother        Tobacco Use    Smoking status: Current Some Day Smoker     Packs/day: 0.25     Types: Cigarettes    Smokeless tobacco: Never Used    Tobacco comment: 3-4 per day   Substance and Sexual Activity    Alcohol use: Yes     Alcohol/week: 2.0 standard drinks     Types: 2 Cans of beer per week     Comment: every night    Drug use: No    Sexual activity: Not Currently      Review of Systems   Constitutional: Positive for chills and fever (subjective ). Negative for appetite change.   HENT: Negative for sore throat.    Respiratory: Positive for shortness of breath.    Cardiovascular: Positive for palpitations. Negative for chest pain.   Gastrointestinal: Negative for abdominal distention, abdominal pain, constipation, diarrhea, nausea and vomiting.   Endocrine: Negative for polydipsia and polyuria.   Genitourinary: Negative for dysuria, hematuria and urgency.   Musculoskeletal: Negative for back pain and gait problem.   Neurological: Negative for weakness and numbness.     Objective:     Vital Signs (Most Recent):  Temp: 99.3 °F (37.4 °C) (01/28/22 0410)  Pulse: 99 (01/28/22 1521)  Resp: (!) 22 (01/28/22 1521)  BP: (!) 153/82 (01/28/22 1403)  SpO2: 96 % (01/28/22 1521) Vital Signs (24h  Range):  Temp:  [99.3 °F (37.4 °C)] 99.3 °F (37.4 °C)  Pulse:  [] 99  Resp:  [19-30] 22  SpO2:  [87 %-100 %] 96 %  BP: (116-206)/() 153/82   Weight: 63.5 kg (139 lb 15.9 oz)  Body mass index is 21.29 kg/m².      Intake/Output Summary (Last 24 hours) at 1/28/2022 1546  Last data filed at 1/28/2022 1357  Gross per 24 hour   Intake --   Output 1350 ml   Net -1350 ml       Physical Exam  Constitutional:       Appearance: Normal appearance.   HENT:      Head: Normocephalic and atraumatic.      Nose: No congestion or rhinorrhea.   Eyes:      General:         Right eye: No discharge.         Left eye: No discharge.   Cardiovascular:      Rate and Rhythm: Tachycardia present. Rhythm irregular.      Pulses: Normal pulses.      Heart sounds: Normal heart sounds.   Pulmonary:      Effort: Respiratory distress present.      Breath sounds: Normal breath sounds. No rales.   Abdominal:      General: Abdomen is flat.      Palpations: Abdomen is soft.   Musculoskeletal:         General: No swelling, tenderness or deformity.      Cervical back: Normal range of motion. No muscular tenderness.      Right lower leg: No edema.      Left lower leg: No edema.   Skin:     General: Skin is warm and dry.      Capillary Refill: Capillary refill takes less than 2 seconds.   Neurological:      General: No focal deficit present.      Mental Status: He is alert and oriented to person, place, and time.         Vents:  Oxygen Concentration (%): 30 (01/28/22 1445)  Lines/Drains/Airways     Peripheral Intravenous Line                 Peripheral IV - Single Lumen 01/28/22 0415 20 G Left Hand <1 day         Peripheral IV - Single Lumen 01/28/22 0729 18 G Right Antecubital <1 day              Significant Labs:    CBC/Anemia Profile:  Recent Labs   Lab 01/28/22 0428   WBC 10.78   HGB 13.0*   HCT 43.0      MCV 84   RDW 19.8*        Chemistries:  Recent Labs   Lab 01/28/22 0428      K 3.8   CL 98   CO2 30*   BUN 7   CREATININE 0.8  "  CALCIUM 8.1*   ALBUMIN 3.2*   PROT 7.2   BILITOT 0.4   ALKPHOS 94   ALT 91*   AST 48*       All pertinent labs within the past 24 hours have been reviewed.    Significant Imaging: I have reviewed all pertinent imaging results/findings within the past 24 hours.    Assessment/Plan:     Neuro  Hx of seizure disorder  - Per chart review; >5 yrs ago  - not on AEDs  - Seizure precautions  - Denies it was due to alcohol withdrawal     Psychiatric  Poor social situation  According to previous note "patient previously living in shelter with job at [employer]; reports his time at the shelter ended after 1 year now homeless with no transportation to job". Has not been able to obtain the necessary medications for his chronic conditions  -  consult   - Will need medications to be delivered at bedside prior to discharge     Alcohol use disorder, mild, abuse  - Vitals q4h while awake  - Start Vitamin supplementation- Thiamine, Folic acid, Vit. B12, and Multivitamin qd  - Reports drinking 1-2 beers per day, denies any auditory or visual hallucination or hx of alcohol withdrawal seizure   - Ativan 2mg q4 PRN if : CIWA >8            Alcohol abuse  See alcohol use disorder     Pulmonary  Chronic obstructive pulmonary disease  See Acute on chronic respiratory failure with hypoxia and hypercapnia     Lung nodule  CTA Chest Non-Coronary (PE Study)  Result Date: 1/28/2022  1. Examination considered not blastic to the proximal segmental pulmonary arterial level without convincing evidence of acute pulmonary embolism. 2. No acute intrathoracic findings identified. 3. Appearance of the lungs without substantial interval change when compared to most recent CTA chest performed 12/21/2021.  Redemonstrated emphysema with areas of architectural distortion nonspecific ground-glass attenuation.  Stable appearance of 12-13 mm nodule in the left lower lobe.  For multiple solid nodules with any 6 mm or greater, Fleischner Society " guidelines recommend follow up with non-contrast chest CT at 3-6 months and 18-24 months after discovery.       COPD exacerbation  See acute on chronic respiratory failure with hypoxia and hypercapnia     Acute on chronic respiratory failure with hypoxia and hypercapnia  Mr. Mccray is a 62 yo M with HTN, COPD on home 2 L NC, current tobacco abuse, Hx of seizure disorder,  chronic diastolic heart failure, mild alcohol use disorder presents for worsening shortness of breath for 1 week.    Patient with Hypercapnic and Hypoxic Respiratory failure which is Acute on chronic.  he is on home oxygen at 2 LPM. Supplemental oxygen was provided and noted- Oxygen Concentration (%):  [30] 30.   Signs/symptoms of respiratory failure include- tachypnea, increased work of breathing and respiratory distress. Contributing diagnoses includes - COPD Labs and images were reviewed. Patient Has recent ABG, which has been reviewed. Will treat underlying causes and adjust management of respiratory failure as follows    Differential includes but not limited to COPD exacerbation vs acute HF exacerbation vs CAP PNA, in the setting of medication non-adherence. Likely a component of all 3. Low suspicion for PE given CTA is negative.      - Prednisone 40mg PO daily to complete a 5 day course  - Levaquin 750mg PO daily to complete a 5 day course  - Xopenex q4h while awake and prn with IS  - Repeat VBG showed improvement of pCO2 to 64.5 from 72.9 after BiPAP usage   - Wean oxygen to 2 L NC and BiPAP QHS   - Will need close follow up with PCP and pulmonology   - will need medications delivered at bedside upon discharge to assure adherence     Cardiac/Vascular  Atrial fibrillation with rapid ventricular response  - Rate control with target HR <110  - Hold for SBP <90/60 or P<45 bpm  - Patient's [id number] score is 3, annual risk of stroke is 2.9  - Magnesium > 2, Potassium > 4  - Continuous telemetry  - TTE given new onset  - Synchronized  cardioversion if hemodynamically unstable  - Was given diltiazem IV pushes with good response; has not required diltiazem gtt that was ordered     Chronic diastolic heart failure  - Repeat TTE pending   - Exerts orthopnea, PND, REES   - Lasix 40 mg BID   - Strict I/O    Recent Labs   Lab 01/28/22  0428   BNP 1,208*         Nonobstructive atherosclerosis of coronary artery  - Continue ASA  - Holding Statin given elevated AST/ALT     Elevated troponin  Likely in the setting of increased demand, tachycardia. Down-trended. Asymptomatic and no EKG changes     Essential hypertension  Home regimen: amlodipine 10 mg QD     Plan:   - Holding home regimen for now, pending official TTE, if depressed EF will consider BB  - Echo was done at bedside with normal EF, diltiazem was given with good response   - Most recent TTE done in 5/21 with EF of 55%; however, TTE done in 3/20 with EF of 10%  - Lopressor 5 mg IV q5min PRN for 3 doses for HR >140   - Xopenex instead of albuterol to reduce tachycardia     GI  Elevated liver enzymes  Denies any abdominal pain   - Abdominal US ordered.   - Holding statin for now   - Daily CMP     Other  Tobacco abuse  - Exerts 1-2 cigarettes per day, started smoking since 19 yoa, was smoking more 1-2 packs per day       Critical Care Daily Checklist:    A: Awake: RASS Goal/Actual Goal:    Actual:     B: Spontaneous Breathing Trial Performed?     C: SAT & SBT Coordinated?  N/A                      D: Delirium: CAM-ICU     E: Early Mobility Performed? Yes   F: Feeding Goal:    Status:     Current Diet Order   Procedures    Diet Cardiac      AS: Analgesia/Sedation N/A   T: Thromboembolic Prophylaxis Lovenox    H: HOB > 300 Yes   U: Stress Ulcer Prophylaxis (if needed) N/A   G: Glucose Control Yes   B: Bowel Function     I: Indwelling Catheter (Lines & Lucas) Necessity PIV    D: De-escalation of Antimicrobials/Pharmacotherapies No    Plan for the day/ETD Wean oxygen     Code Status:  Family/Goals of  Care: Full Code         Critical secondary to Patient has a condition that poses threat to life and bodily function: Severe Respiratory Distress     Critical care was time spent personally by me on the following activities: development of treatment plan with patient or surrogate and bedside caregivers, discussions with consultants, evaluation of patient's response to treatment, examination of patient, ordering and performing treatments and interventions, ordering and review of laboratory studies, ordering and review of radiographic studies, pulse oximetry, re-evaluation of patient's condition. This critical care time did not overlap with that of any other provider or involve time for any procedures.     Jigna Anna, DO  Critical Care Medicine  [facility] - Emergency Dept

## 2022-01-28 NOTE — HPI
Mr. Mccray is a 62 yo M with HTN, COPD on home 2 L NC, current tobacco abuse, Hx of seizure disorder,  chronic diastolic heart failure, mild alcohol use disorder presents for worsening shortness of breath for 1 week.  Per patient, he has been increasing his home oxygen to 5-6 L NC. Also have had multiple admission, ED visits for COPD exacerbation in the past. Also admits that he have not been taking his inhaler, and BP medications as instructed. Associated symptoms of subjective fever, night sweat, and dry cough. Recently, went to an urgent care for similar symptoms and was given prednisone 20 mg which is taking but it is not helping. Unaware of hx of HF but does exert orthopnea, and REES, and weight gain. Denies lower extremities swelling. Pt denies chest pain, sore throat, abdominal pain, n/v/d, constipation, fever, chills, headache, numbness or tingling or weakness of the extremities.     In the ED, /127,  afib with rvr, RR 22, sat 99% on a non-rebreather mask. Labs significant for BNP 1208, troponin 0.169, VBG with pH 7.346, pCO2 72.9, pO2 87. CTA with no acute intrathoracic findings identified, appearance of the lungs without substantial interval change when compared to most recent CTA chest performed 12/21/2021.  Redemonstrated emphysema with areas of architectural distortion nonspecific ground-glass attenuation, stable appearance of 12-13 mm nodule in the left lower lobe. Pt was placed on BiPAP, started on levofloxacin, duoneb, diltiazem, lasix 40 mg. Pt is admitted to MICU for higher level care.

## 2022-01-28 NOTE — ASSESSMENT & PLAN NOTE
CTA Chest Non-Coronary (PE Study)  Result Date: 1/28/2022  1. Examination considered not blastic to the proximal segmental pulmonary arterial level without convincing evidence of acute pulmonary embolism. 2. No acute intrathoracic findings identified. 3. Appearance of the lungs without substantial interval change when compared to most recent CTA chest performed 12/21/2021.  Redemonstrated emphysema with areas of architectural distortion nonspecific ground-glass attenuation.  Stable appearance of 12-13 mm nodule in the left lower lobe.  For multiple solid nodules with any 6 mm or greater, Fleischner Society guidelines recommend follow up with non-contrast chest CT at 3-6 months and 18-24 months after discovery.

## 2022-01-28 NOTE — ASSESSMENT & PLAN NOTE
"According to previous note "patient previously living in shelter with job at [employer]; reports his time at the shelter ended after 1 year now homeless with no transportation to job". Has not been able to obtain the necessary medications for his chronic conditions  -  consult   - Will need medications to be delivered at bedside prior to discharge   "

## 2022-01-29 LAB
ALBUMIN SERPL BCP-MCNC: 3 G/DL (ref 3.5–5.2)
ALP SERPL-CCNC: 82 U/L (ref 55–135)
ALT SERPL W/O P-5'-P-CCNC: 69 U/L (ref 10–44)
ANION GAP SERPL CALC-SCNC: 9 MMOL/L (ref 8–16)
ASCENDING AORTA: 2.49 CM
AST SERPL-CCNC: 31 U/L (ref 10–40)
AV INDEX (PROSTH): 0.78
AV MEAN GRADIENT: 4 MMHG
AV PEAK GRADIENT: 7 MMHG
AV VALVE AREA: 2.76 CM2
AV VELOCITY RATIO: 0.87
BASOPHILS # BLD AUTO: 0 K/UL (ref 0–0.2)
BASOPHILS NFR BLD: 0 % (ref 0–1.9)
BILIRUB SERPL-MCNC: 0.5 MG/DL (ref 0.1–1)
BSA FOR ECHO PROCEDURE: 1.74 M2
BUN SERPL-MCNC: 18 MG/DL (ref 6–20)
CALCIUM SERPL-MCNC: 8.9 MG/DL (ref 8.7–10.5)
CHLORIDE SERPL-SCNC: 92 MMOL/L (ref 95–110)
CO2 SERPL-SCNC: 38 MMOL/L (ref 23–29)
CREAT SERPL-MCNC: 0.9 MG/DL (ref 0.5–1.4)
CV ECHO LV RWT: 0.37 CM
DIFFERENTIAL METHOD: ABNORMAL
DOP CALC AO PEAK VEL: 1.35 M/S
DOP CALC AO VTI: 23.73 CM
DOP CALC LVOT AREA: 3.5 CM2
DOP CALC LVOT DIAMETER: 2.12 CM
DOP CALC LVOT PEAK VEL: 1.17 M/S
DOP CALC LVOT STROKE VOLUME: 65.59 CM3
DOP CALCLVOT PEAK VEL VTI: 18.59 CM
E WAVE DECELERATION TIME: 232.32 MSEC
E/A RATIO: 0.57
E/E' RATIO: 5.53 M/S
ECHO LV POSTERIOR WALL: 0.78 CM (ref 0.6–1.1)
EJECTION FRACTION: 55 %
EOSINOPHIL # BLD AUTO: 0 K/UL (ref 0–0.5)
EOSINOPHIL NFR BLD: 0 % (ref 0–8)
ERYTHROCYTE [DISTWIDTH] IN BLOOD BY AUTOMATED COUNT: 20 % (ref 11.5–14.5)
EST. GFR  (AFRICAN AMERICAN): >60 ML/MIN/1.73 M^2
EST. GFR  (NON AFRICAN AMERICAN): >60 ML/MIN/1.73 M^2
FRACTIONAL SHORTENING: 27 % (ref 28–44)
GLUCOSE SERPL-MCNC: 105 MG/DL (ref 70–110)
HCT VFR BLD AUTO: 41.1 % (ref 40–54)
HGB BLD-MCNC: 12.9 G/DL (ref 14–18)
IMM GRANULOCYTES # BLD AUTO: 0.03 K/UL (ref 0–0.04)
IMM GRANULOCYTES NFR BLD AUTO: 0.5 % (ref 0–0.5)
INTERVENTRICULAR SEPTUM: 0.73 CM (ref 0.6–1.1)
IVRT: 125.59 MSEC
LA MAJOR: 5.11 CM
LA MINOR: 5.23 CM
LA WIDTH: 3.71 CM
LEFT ATRIUM SIZE: 3.78 CM
LEFT ATRIUM VOLUME INDEX MOD: 18.1 ML/M2
LEFT ATRIUM VOLUME INDEX: 35.2 ML/M2
LEFT ATRIUM VOLUME MOD: 31.66 CM3
LEFT ATRIUM VOLUME: 61.62 CM3
LEFT INTERNAL DIMENSION IN SYSTOLE: 3.04 CM (ref 2.1–4)
LEFT VENTRICLE DIASTOLIC VOLUME INDEX: 44.32 ML/M2
LEFT VENTRICLE DIASTOLIC VOLUME: 77.56 ML
LEFT VENTRICLE MASS INDEX: 53 G/M2
LEFT VENTRICLE SYSTOLIC VOLUME INDEX: 20.7 ML/M2
LEFT VENTRICLE SYSTOLIC VOLUME: 36.29 ML
LEFT VENTRICULAR INTERNAL DIMENSION IN DIASTOLE: 4.18 CM (ref 3.5–6)
LEFT VENTRICULAR MASS: 93.11 G
LV LATERAL E/E' RATIO: 5.88 M/S
LV SEPTAL E/E' RATIO: 5.22 M/S
LYMPHOCYTES # BLD AUTO: 0.4 K/UL (ref 1–4.8)
LYMPHOCYTES NFR BLD: 6.5 % (ref 18–48)
MAGNESIUM SERPL-MCNC: 1.7 MG/DL (ref 1.6–2.6)
MCH RBC QN AUTO: 25.8 PG (ref 27–31)
MCHC RBC AUTO-ENTMCNC: 31.4 G/DL (ref 32–36)
MCV RBC AUTO: 82 FL (ref 82–98)
MONOCYTES # BLD AUTO: 0.5 K/UL (ref 0.3–1)
MONOCYTES NFR BLD: 7.7 % (ref 4–15)
MV PEAK A VEL: 0.83 M/S
MV PEAK E VEL: 0.47 M/S
MV STENOSIS PRESSURE HALF TIME: 67.37 MS
MV VALVE AREA P 1/2 METHOD: 3.27 CM2
NEUTROPHILS # BLD AUTO: 5.4 K/UL (ref 1.8–7.7)
NEUTROPHILS NFR BLD: 85.3 % (ref 38–73)
NRBC BLD-RTO: 0 /100 WBC
PISA TR MAX VEL: 3.43 M/S
PLATELET # BLD AUTO: 328 K/UL (ref 150–450)
PMV BLD AUTO: 10.3 FL (ref 9.2–12.9)
POCT GLUCOSE: 95 MG/DL (ref 70–110)
POTASSIUM SERPL-SCNC: 3.9 MMOL/L (ref 3.5–5.1)
PROT SERPL-MCNC: 6.2 G/DL (ref 6–8.4)
RA MAJOR: 3.88 CM
RA PRESSURE: 3 MMHG
RA WIDTH: 3.29 CM
RBC # BLD AUTO: 5 M/UL (ref 4.6–6.2)
RIGHT VENTRICULAR END-DIASTOLIC DIMENSION: 3.26 CM
RV TISSUE DOPPLER FREE WALL SYSTOLIC VELOCITY 1 (APICAL 4 CHAMBER VIEW): 12.12 CM/S
SINUS: 2.86 CM
SODIUM SERPL-SCNC: 139 MMOL/L (ref 136–145)
STJ: 2.25 CM
TDI LATERAL: 0.08 M/S
TDI SEPTAL: 0.09 M/S
TDI: 0.09 M/S
TR MAX PG: 47 MMHG
TRICUSPID ANNULAR PLANE SYSTOLIC EXCURSION: 1.8 CM
TV REST PULMONARY ARTERY PRESSURE: 50 MMHG
WBC # BLD AUTO: 6.34 K/UL (ref 3.9–12.7)

## 2022-01-29 PROCEDURE — 94761 N-INVAS EAR/PLS OXIMETRY MLT: CPT

## 2022-01-29 PROCEDURE — 94640 AIRWAY INHALATION TREATMENT: CPT

## 2022-01-29 PROCEDURE — 83735 ASSAY OF MAGNESIUM: CPT | Performed by: PHYSICIAN ASSISTANT

## 2022-01-29 PROCEDURE — 99900031 HC PATIENT EDUCATION (STAT)

## 2022-01-29 PROCEDURE — 63600175 PHARM REV CODE 636 W HCPCS: Performed by: INTERNAL MEDICINE

## 2022-01-29 PROCEDURE — 20600001 HC STEP DOWN PRIVATE ROOM

## 2022-01-29 PROCEDURE — 99900035 HC TECH TIME PER 15 MIN (STAT)

## 2022-01-29 PROCEDURE — 80053 COMPREHEN METABOLIC PANEL: CPT | Performed by: PHYSICIAN ASSISTANT

## 2022-01-29 PROCEDURE — 94660 CPAP INITIATION&MGMT: CPT

## 2022-01-29 PROCEDURE — 99291 CRITICAL CARE FIRST HOUR: CPT | Mod: ,,, | Performed by: STUDENT IN AN ORGANIZED HEALTH CARE EDUCATION/TRAINING PROGRAM

## 2022-01-29 PROCEDURE — 25000003 PHARM REV CODE 250: Performed by: STUDENT IN AN ORGANIZED HEALTH CARE EDUCATION/TRAINING PROGRAM

## 2022-01-29 PROCEDURE — 99291 PR CRITICAL CARE, E/M 30-74 MINUTES: ICD-10-PCS | Mod: ,,, | Performed by: STUDENT IN AN ORGANIZED HEALTH CARE EDUCATION/TRAINING PROGRAM

## 2022-01-29 PROCEDURE — 85025 COMPLETE CBC W/AUTO DIFF WBC: CPT | Performed by: STUDENT IN AN ORGANIZED HEALTH CARE EDUCATION/TRAINING PROGRAM

## 2022-01-29 PROCEDURE — 25000242 PHARM REV CODE 250 ALT 637 W/ HCPCS: Performed by: INTERNAL MEDICINE

## 2022-01-29 PROCEDURE — 27100171 HC OXYGEN HIGH FLOW UP TO 24 HOURS

## 2022-01-29 PROCEDURE — 63600175 PHARM REV CODE 636 W HCPCS: Performed by: STUDENT IN AN ORGANIZED HEALTH CARE EDUCATION/TRAINING PROGRAM

## 2022-01-29 RX ORDER — LABETALOL HCL 20 MG/4 ML
20 SYRINGE (ML) INTRAVENOUS EVERY 4 HOURS PRN
Status: DISCONTINUED | OUTPATIENT
Start: 2022-01-29 | End: 2022-01-30 | Stop reason: HOSPADM

## 2022-01-29 RX ORDER — MAGNESIUM SULFATE HEPTAHYDRATE 40 MG/ML
2 INJECTION, SOLUTION INTRAVENOUS ONCE
Status: COMPLETED | OUTPATIENT
Start: 2022-01-29 | End: 2022-01-29

## 2022-01-29 RX ADMIN — FLUTICASONE FUROATE AND VILANTEROL TRIFENATATE 1 PUFF: 100; 25 POWDER RESPIRATORY (INHALATION) at 09:01

## 2022-01-29 RX ADMIN — FUROSEMIDE 40 MG: 10 INJECTION, SOLUTION INTRAMUSCULAR; INTRAVENOUS at 12:01

## 2022-01-29 RX ADMIN — MULTIPLE VITAMINS W/ MINERALS TAB 1 TABLET: TAB at 09:01

## 2022-01-29 RX ADMIN — THIAMINE HCL TAB 100 MG 100 MG: 100 TAB at 09:01

## 2022-01-29 RX ADMIN — PREDNISONE 40 MG: 20 TABLET ORAL at 09:01

## 2022-01-29 RX ADMIN — MAGNESIUM SULFATE 2 G: 2 INJECTION INTRAVENOUS at 06:01

## 2022-01-29 RX ADMIN — CYANOCOBALAMIN TAB 250 MCG 250 MCG: 250 TAB at 09:01

## 2022-01-29 RX ADMIN — LEVOFLOXACIN 750 MG: 750 TABLET, FILM COATED ORAL at 09:01

## 2022-01-29 RX ADMIN — ENOXAPARIN SODIUM 40 MG: 100 INJECTION SUBCUTANEOUS at 05:01

## 2022-01-29 RX ADMIN — FOLIC ACID 1 MG: 1 TABLET ORAL at 09:01

## 2022-01-29 RX ADMIN — TIOTROPIUM BROMIDE INHALATION SPRAY 2 PUFF: 3.12 SPRAY, METERED RESPIRATORY (INHALATION) at 03:01

## 2022-01-29 RX ADMIN — FUROSEMIDE 40 MG: 10 INJECTION, SOLUTION INTRAMUSCULAR; INTRAVENOUS at 02:01

## 2022-01-29 NOTE — ASSESSMENT & PLAN NOTE
Home regimen: amlodipine 10 mg QD     Plan:   - Holding home regimen for now, pending official TTE, if depressed EF will consider GDMT  - Bedside echo with normal EF, diltiazem was given with good response  - Most recent TTE done in 5/21 with EF of 55%; however, TTE done in 3/20 with EF of 10%  - Xopenex instead of albuterol to reduce tachycardia

## 2022-01-29 NOTE — PROGRESS NOTES
[facility] - Cardiac Intensive Care  Critical Care Medicine  Progress Note    Patient Name: Baltazar Mccray  MRN: [id number]  Admission Date: 1/28/2022  Hospital Length of Stay: 1 days  Code Status: Full Code  Attending Provider: Luis Enrique Swain MD  Primary Care Provider: Rhonda Of Katia   Principal Problem: Acute on chronic respiratory failure with hypoxia and hypercapnia    Subjective:     HPI:  Mr. Mccray is a 62 yo M with HTN, COPD on home 2 L NC, current tobacco abuse, Hx of seizure disorder,  chronic diastolic heart failure, mild alcohol use disorder presents for worsening shortness of breath for 1 week.  Per patient, he has been increasing his home oxygen to 5-6 L NC. Also have had multiple admission, ED visits for COPD exacerbation in the past. Also admits that he have not been taking his inhaler, and BP medications as instructed. Associated symptoms of subjective fever, night sweat, and dry cough. Recently, went to an urgent care for similar symptoms and was given prednisone 20 mg which is taking but it is not helping. Unaware of hx of HF but does exert orthopnea, and REES, and weight gain. Denies lower extremities swelling. Pt denies chest pain, sore throat, abdominal pain, n/v/d, constipation, fever, chills, headache, numbness or tingling or weakness of the extremities.     In the ED, /127,  afib with rvr, RR 22, sat 99% on a non-rebreather mask. Labs significant for BNP 1208, troponin 0.169, VBG with pH 7.346, pCO2 72.9, pO2 87. CTA with no acute intrathoracic findings identified, appearance of the lungs without substantial interval change when compared to most recent CTA chest performed 12/21/2021.  Redemonstrated emphysema with areas of architectural distortion nonspecific ground-glass attenuation, stable appearance of 12-13 mm nodule in the left lower lobe. Pt was placed on BiPAP, started on levofloxacin, duoneb, diltiazem, lasix 40 mg. Pt is admitted to MICU for higher level care.          Hospital/ICU Course:  Admitted to MICU for COPD exacerbation. VBG improved on BiPap. Weaned down to 3L NC. LFTs elevated, hep panel negative, liver U/S showing steatosis and small gallstone. Repeat TTE ordered. New onset Afib rate controlled with diltiazem pushes. Stable for stepdown on 1/29.      Interval History/Significant Events: NAEO. Received Labetalol PRN for HTN. Afib rate controlled with diltiazem pushes. Now off Bipap, on 3L NC. Stable for stepdown. TTE pending.    Review of Systems   Constitutional: Negative for appetite change, chills and fever.   HENT: Negative for sore throat.    Respiratory: Positive for shortness of breath and wheezing.    Cardiovascular: Negative for chest pain and palpitations.   Gastrointestinal: Negative for abdominal distention, abdominal pain, constipation, diarrhea, nausea and vomiting.   Endocrine: Negative for polydipsia and polyuria.   Genitourinary: Negative for dysuria, hematuria and urgency.   Musculoskeletal: Negative for back pain and gait problem.   Neurological: Negative for weakness and numbness.     Objective:     Vital Signs (Most Recent):  Temp: 98.5 °F (36.9 °C) (01/29/22 0315)  Pulse: 92 (01/29/22 1100)  Resp: 18 (01/29/22 1100)  BP: (!) 162/91 (01/29/22 1100)  SpO2: 99 % (01/29/22 1100) Vital Signs (24h Range):  Temp:  [98.4 °F (36.9 °C)-98.5 °F (36.9 °C)] 98.5 °F (36.9 °C)  Pulse:  [] 92  Resp:  [12-35] 18  SpO2:  [90 %-100 %] 99 %  BP: (134-202)/() 162/91   Weight: 63.5 kg (139 lb 15.9 oz)  Body mass index is 21.29 kg/m².      Intake/Output Summary (Last 24 hours) at 1/29/2022 1318  Last data filed at 1/29/2022 0900  Gross per 24 hour   Intake 190.98 ml   Output 1700 ml   Net -1509.02 ml       Physical Exam  Constitutional:       Appearance: Normal appearance.   HENT:      Head: Normocephalic and atraumatic.      Nose: No congestion or rhinorrhea.      Mouth/Throat:      Mouth: Mucous membranes are moist.   Eyes:      General:          Right eye: No discharge.         Left eye: No discharge.      Conjunctiva/sclera: Conjunctivae normal.   Cardiovascular:      Rate and Rhythm: Normal rate. Rhythm irregular.      Pulses: Normal pulses.      Heart sounds: Normal heart sounds.   Pulmonary:      Effort: Respiratory distress present.      Breath sounds: Wheezing present. No rales.   Abdominal:      General: Abdomen is flat. Bowel sounds are normal. There is no distension.      Palpations: Abdomen is soft.      Tenderness: There is no abdominal tenderness. There is no guarding or rebound.   Musculoskeletal:         General: No swelling, tenderness or deformity.      Cervical back: Normal range of motion. No muscular tenderness.      Right lower leg: No edema.      Left lower leg: No edema.   Skin:     General: Skin is warm and dry.      Capillary Refill: Capillary refill takes less than 2 seconds.   Neurological:      General: No focal deficit present.      Mental Status: He is alert and oriented to person, place, and time.         Vents:  Oxygen Concentration (%): 25 (01/29/22 0825)  Lines/Drains/Airways     Peripheral Intravenous Line                 Peripheral IV - Single Lumen 01/28/22 0415 20 G Left Hand 1 day         Peripheral IV - Single Lumen 01/28/22 0729 18 G Right Antecubital 1 day              Significant Labs:    CBC/Anemia Profile:  Recent Labs   Lab 01/28/22  0428 01/29/22  0410   WBC 10.78 6.34   HGB 13.0* 12.9*   HCT 43.0 41.1    328   MCV 84 82   RDW 19.8* 20.0*        Chemistries:  Recent Labs   Lab 01/28/22  0428 01/29/22  0518    139   K 3.8 3.9   CL 98 92*   CO2 30* 38*   BUN 7 18   CREATININE 0.8 0.9   CALCIUM 8.1* 8.9   ALBUMIN 3.2* 3.0*   PROT 7.2 6.2   BILITOT 0.4 0.5   ALKPHOS 94 82   ALT 91* 69*   AST 48* 31   MG  --  1.7       All pertinent labs within the past 24 hours have been reviewed.    Significant Imaging:  Imaging Results          US Abdomen Complete (Final result)  Result time 01/29/22 09:21:07    Final  result by Alec Carmichael MD (01/29/22 09:21:07)                 Impression:      0.4 cm gallstone versus gallbladder polyp, no secondary findings to suggest acute cholecystitis.    Hepatic steatosis.    Electronically signed by resident: Joe Echavarria  Date:    01/29/2022  Time:    09:12    Electronically signed by: Alec Carmichael MD  Date:    01/29/2022  Time:    09:21             Narrative:    EXAMINATION:  US ABDOMEN COMPLETE    CLINICAL HISTORY:  elevated liver enzymes;    TECHNIQUE:  Complete abdominal ultrasound (including pancreas, liver, gallbladder, common bile duct, spleen, aorta, IVC, and kidneys) was performed.    COMPARISON:  None    FINDINGS:  Pancreas: The visualized portions of pancreas appear normal.    Aorta: No aneurysm.    Liver: 18.2 cm, normal in size. Diffusely increased parenchymal echogenicity consistent with steatosis. No focal lesions.    Gallbladder: There is a 0.4 cm hyperechoic focus abutting the gallbladder wall without appreciable vascularity on Doppler.  No gallbladder wall thickening, hyperemia or pericholecystic fluid.  Negative sonographic Hernadez sign.    Biliary system: 3 mm common bile duct.  No intrahepatic ductal dilatation.    Inferior vena cava: Normal in appearance.    Right kidney: 11.3 cm. No hydronephrosis.    Left kidney: 10.8 cm. 2    Spleen: 8.2 x 4.0 cm.  Normal in size with homogeneous echotexture.    Miscellaneous: No ascites.                               CTA Chest Non-Coronary (PE Study) (Final result)  Result time 01/28/22 06:22:43    Final result by Alec Griffiths MD (01/28/22 06:22:43)                 Impression:      1. Examination considered not blastic to the proximal segmental pulmonary arterial level without convincing evidence of acute pulmonary embolism.  2. No acute intrathoracic findings identified.  3. Appearance of the lungs without substantial interval change when compared to most recent CTA chest performed 12/21/2021.  Redemonstrated  emphysema with areas of architectural distortion nonspecific ground-glass attenuation.  Stable appearance of 12-13 mm nodule in the left lower lobe.  For multiple solid nodules with any 6 mm or greater, Fleischner Society guidelines recommend follow up with non-contrast chest CT at 3-6 months and 18-24 months after discovery.  4. Additional details, as provided in the body report.      Electronically signed by: Alec Griffiths  Date:    01/28/2022  Time:    06:22             Narrative:    EXAMINATION:  CTA CHEST NON CORONARY    CLINICAL HISTORY:  Pulmonary embolism (PE) suspected, positive D-dimer;    TECHNIQUE:  Low dose axial images, sagittal and coronal reformations were obtained from the thoracic inlet to the lung bases following the IV administration of 75 mL of Omnipaque 350.  Contrast timing was optimized to evaluate the pulmonary arteries.  MIP images were performed.    COMPARISON:  CTA chest performed 12/21/2021.    FINDINGS:  Exam quality: Examination considered diagnostic to the proximal segmental pulmonary arterial level.    Pulmonary embolism: No acute or chronic pulmonary embolism.    Central pulmonary arteries: Normal caliber.    Aorta: Normal caliber.  Atherosclerotic calcification.    Heart: No right heart strain. Normal size.    Coronary arteries: Limited assessment.    Pericardium: Normal. No effusion, thickening, or calcification.    Support tubes and lines: None.    Base of neck/thyroid: Normal.    Lymph nodes: No supraclavicular, axillary, internal mammary, mediastinal, or hilar adenopathy.    Esophagus: Normal.    Pleura: No effusion, thickening, or calcification.    Upper abdomen: Unremarkable    Body wall: Unremarkable    Airways: Central airways are patent.  Areas of relatively smooth bronchial thickening bilaterally, most conspicuous in the lower lobes.    Lungs: Assessment of the lungs is limited due to respiratory motion.  Emphysematous changes are again appreciated.  Atelectasis and scar  "is noted.  Overall there appears to be no substantial interval change relative to most recent CTA chest performed 12/21/2021.  Stable appearance of 12-13 mm nodule in the left lower lobe (series 3, image 355).  Multiple additional smaller sub 4 mm nodules in both lungs also unchanged additional areas of ground-glass attenuation and reticulation similar to prior.    Bones: No focal lesion.                               X-Ray Chest AP Portable (Final result)  Result time 01/28/22 04:47:55    Final result by Antony Samayoa MD (01/28/22 04:47:55)                 Impression:      No acute findings.    No significant change from prior study.      Electronically signed by: Antony Samayoa MD  Date:    01/28/2022  Time:    04:47             Narrative:    EXAMINATION:  XR CHEST AP PORTABLE    CLINICAL HISTORY:  shortness of breath;    TECHNIQUE:  Single frontal view of the chest was performed.    COMPARISON:  01/15/2022.    FINDINGS:  Chronic increased markings in the lungs, similar to prior.    Heart and lungs  appear unchanged when allowing for differences in technique and positioning.                                I have reviewed all pertinent imaging results/findings within the past 24 hours.      ABG  Recent Labs   Lab 01/28/22  0958   PH 7.375   PO2 37*   PCO2 64.5*   HCO3 37.7*   BE 12     Assessment/Plan:     Neuro  Hx of seizure disorder  - Per chart review; >5 yrs ago  - not on AEDs  - Seizure precautions  - Denies it was due to alcohol withdrawal     Psychiatric  Poor social situation  According to previous note "patient previously living in shelter with job at [employer]; reports his time at the shelter ended after 1 year now homeless with no transportation to job". Has not been able to obtain the necessary medications for his chronic conditions  -  consult   - Will need medications to be delivered at bedside prior to discharge     Alcohol use disorder, mild, abuse  - Vitals q4h while awake  - " Vitamin supplementation- Thiamine, Folic acid, Vit. B12, and Multivitamin qd  - Reports drinking 1-2 beers per day, denies any auditory or visual hallucination or hx of alcohol withdrawal seizure   - Ativan 2mg q4 PRN if : CIWA >8            Alcohol abuse  See alcohol use disorder     Pulmonary  * Acute on chronic respiratory failure with hypoxia and hypercapnia  Mr. Mccray is a 60 yo M with HTN, COPD on home 2 L NC, current tobacco abuse, Hx of seizure disorder,  chronic diastolic heart failure, mild alcohol use disorder presents for worsening shortness of breath for 1 week.    Patient with Hypercapnic and Hypoxic Respiratory failure which is Acute on chronic.  he is on home oxygen at 2 LPM. Supplemental oxygen was provided and noted- Oxygen Concentration (%):  [25-30] 25.   Signs/symptoms of respiratory failure include- tachypnea, increased work of breathing and respiratory distress. Contributing diagnoses includes - COPD Labs and images were reviewed. Patient Has recent ABG, which has been reviewed. Will treat underlying causes and adjust management of respiratory failure as follows    Differential includes but not limited to COPD exacerbation vs acute HF exacerbation vs CAP PNA, in the setting of medication non-adherence. Likely a component of all 3. Low suspicion for PE given CTA is negative.      - Prednisone 40mg PO daily to complete a 5 day course  - Levaquin 750mg PO daily to complete a 5 day course  - Xopenex q4h while awake and prn with IS  - Repeat VBG showed improvement of pCO2 to 64.5 from 72.9 after BiPAP usage   - Wean oxygen to 2 L NC and BiPAP QHS   - Will need close follow up with PCP and pulmonology   - Will need medications delivered at bedside upon discharge to assure adherence     Chronic obstructive pulmonary disease  See Acute on chronic respiratory failure with hypoxia and hypercapnia     Chronic respiratory failure  Patient with Hypercapnic Respiratory failure which is Acute on chronic.   he is on home oxygen at 2 LPM. Supplemental oxygen was provided and noted- Oxygen Concentration (%):  [25-30] 25.   Signs/symptoms of respiratory failure include- tachypnea, increased work of breathing, respiratory distress, use of accessory muscles and wheezing. Contributing diagnoses includes - COPD Labs and images were reviewed. Patient Has recent ABG, which has been reviewed. Will treat underlying causes and adjust management of respiratory failure as follows- See acute on chronic respiratory failure with hypoxia and hypercapnia    Lung nodule  CTA Chest Non-Coronary (PE Study)  Result Date: 1/28/2022  1. Examination considered not blastic to the proximal segmental pulmonary arterial level without convincing evidence of acute pulmonary embolism. 2. No acute intrathoracic findings identified. 3. Appearance of the lungs without substantial interval change when compared to most recent CTA chest performed 12/21/2021.  Redemonstrated emphysema with areas of architectural distortion nonspecific ground-glass attenuation.  Stable appearance of 12-13 mm nodule in the left lower lobe.  For multiple solid nodules with any 6 mm or greater, Fleischner Society guidelines recommend follow up with non-contrast chest CT at 3-6 months and 18-24 months after discovery.       COPD exacerbation  See acute on chronic respiratory failure with hypoxia and hypercapnia     Cardiac/Vascular  Atrial fibrillation with rapid ventricular response  New onset Afib, Improved with diltiazem pushes  - Rate control with target HR <110  - Hold for SBP <90/60 or P<45 bpm  - Patient's [id number] score is 3, annual risk of stroke is 2.9  - Magnesium > 2, Potassium > 4  - Continuous telemetry  - TTE pending; may require GDMT/BB if EF reduced  - Synchronized cardioversion if hemodynamically unstable  - Was given diltiazem IV pushes with good response; has not required diltiazem gtt  - F/U with Cardiology after discharge    Chronic diastolic heart  failure  Previously reduced EF 10% on Echo in 3/2020, last Echo in 5/2021 with EF 55%  - Repeat TTE pending   - Lasix 40 mg BID   - Strict I/O    Recent Labs   Lab 01/28/22  0428   BNP 1,208*         Nonobstructive atherosclerosis of coronary artery  - Continue ASA  - LFTs normalizing, Lipitor restarted     Elevated troponin  Likely in the setting of increased demand, tachycardia. Down-trended. Asymptomatic and no EKG changes     Essential hypertension  Home regimen: amlodipine 10 mg QD     Plan:   - Holding home regimen for now, pending official TTE, if depressed EF will consider GDMT  - Bedside echo with normal EF, diltiazem was given with good response  - Most recent TTE done in 5/21 with EF of 55%; however, TTE done in 3/20 with EF of 10%  - Xopenex instead of albuterol to reduce tachycardia     GI  Elevated liver enzymes  Denies any abdominal pain, hep panel negative, LFTs normalizing  - Abdominal US with gallstone and hepatic steatosis    - Daily CMP, monitor LFTs      Other  Tobacco abuse  - Exerts 1-2 cigarettes per day, started smoking since 19 yoa, was smoking more 1-2 packs per day        Critical Care Daily Checklist:    A: Awake: RASS Goal/Actual Goal:    Actual: Rosa Agitation Sedation Scale (RASS): Alert and calm   B: Spontaneous Breathing Trial Performed?     C: SAT & SBT Coordinated?  N/a                      D: Delirium: CAM-ICU Overall CAM-ICU: Negative   E: Early Mobility Performed? Yes   F: Feeding Goal:    Status:     Current Diet Order   Procedures    Diet Cardiac      AS: Analgesia/Sedation N/a   T: Thromboembolic Prophylaxis Lovenox   H: HOB > 300 Yes   U: Stress Ulcer Prophylaxis (if needed) N/a   G: Glucose Control N/a   B: Bowel Function Stool Occurrence: 1   I: Indwelling Catheter (Lines & Lucas) Necessity N/a   D: De-escalation of Antimicrobials/Pharmacotherapies Levofloxacin x 5d    Plan for the day/ETD Wean O2, stepdown to the floor    Code Status:  Family/Goals of Care: Full  Code  Discharge home       Critical secondary to Patient has a condition that poses threat to life and bodily function: Severe Respiratory Distress      Critical care was time spent personally by me on the following activities: development of treatment plan with patient or surrogate and bedside caregivers, discussions with consultants, evaluation of patient's response to treatment, examination of patient, ordering and performing treatments and interventions, ordering and review of laboratory studies, ordering and review of radiographic studies, pulse oximetry, re-evaluation of patient's condition. This critical care time did not overlap with that of any other provider or involve time for any procedures.     Chuck Harman MD  Critical Care Medicine  [facility] - Cardiac Intensive Care

## 2022-01-29 NOTE — ASSESSMENT & PLAN NOTE
New onset Afib, Improved with diltiazem pushes  - Rate control with target HR <110  - Hold for SBP <90/60 or P<45 bpm  - Patient's [id number] score is 3, annual risk of stroke is 2.9  - Magnesium > 2, Potassium > 4  - Continuous telemetry  - TTE pending; may require GDMT/BB if EF reduced  - Synchronized cardioversion if hemodynamically unstable  - Was given diltiazem IV pushes with good response; has not required diltiazem gtt  - F/U with Cardiology after discharge

## 2022-01-29 NOTE — HOSPITAL COURSE
Admitted to MICU for COPD exacerbation. VBG improved on BiPap. Weaned down to 3L NC. LFTs elevated, hep panel negative, liver U/S showing steatosis and small gallstone. Repeat TTE ordered. New onset Afib rate controlled with diltiazem pushes. Stable for stepdown on 1/29.

## 2022-01-29 NOTE — RESIDENT HANDOFF
ICU Transfer of Care Note  Critical Care Medicine    Admit Date: 1/28/2022  LOS: 1    CC: Acute on chronic respiratory failure with hypoxia and hypercapnia    Code Status: Full Code         HPI and Hospital Course:     HPI:  Mr. Mccray is a 60 yo M with HTN, COPD on home 2 L NC, current tobacco abuse, Hx of seizure disorder,  chronic diastolic heart failure, mild alcohol use disorder presents for worsening shortness of breath for 1 week.  Per patient, he has been increasing his home oxygen to 5-6 L NC. Also have had multiple admission, ED visits for COPD exacerbation in the past. Also admits that he have not been taking his inhaler, and BP medications as instructed. Associated symptoms of subjective fever, night sweat, and dry cough. Recently, went to an urgent care for similar symptoms and was given prednisone 20 mg which is taking but it is not helping. Unaware of hx of HF but does exert orthopnea, and REES, and weight gain. Denies lower extremities swelling. Pt denies chest pain, sore throat, abdominal pain, n/v/d, constipation, fever, chills, headache, numbness or tingling or weakness of the extremities.     In the ED, /127,  afib with rvr, RR 22, sat 99% on a non-rebreather mask. Labs significant for BNP 1208, troponin 0.169, VBG with pH 7.346, pCO2 72.9, pO2 87. CTA with no acute intrathoracic findings identified, appearance of the lungs without substantial interval change when compared to most recent CTA chest performed 12/21/2021.  Redemonstrated emphysema with areas of architectural distortion nonspecific ground-glass attenuation, stable appearance of 12-13 mm nodule in the left lower lobe. Pt was placed on BiPAP, started on levofloxacin, duoneb, diltiazem, lasix 40 mg. Pt is admitted to MICU for higher level care.         Hospital/ICU Course:  Admitted to MICU for COPD exacerbation. VBG improved on BiPap. Weaned down to 3L NC. LFTs elevated, hep panel negative, liver U/S showing steatosis and  small gallstone. Repeat TTE ordered. New onset Afib rate controlled with diltiazem pushes. Stable for stepdown on 1/29.        To Follow Up:     - Continue to wean supplemental O2  - F/U on TTE, may need GDMT if EF reduced  - F/U HR, new onset Afib rate controlled with diltiazem pushes, no need for gtt  - Will need bedside med delivery to ensure compliance  - F/U with outpatient pulmonology and cardiology  - F/U PT/OT recs for disposition      Discharge Plan:     Likely discharge home with close follow up, f/u PT/OT recs    Call with questions.     Chuck Harman MD  PGY-1  [facility]

## 2022-01-29 NOTE — NURSING
Pt transferred to room 8073 via w/c. Pt monitored while being transferred with o2 @ 3l bnc.  All of patients belongings were taken with him to floor.

## 2022-01-29 NOTE — SUBJECTIVE & OBJECTIVE
Interval History/Significant Events: NAEO. Received Labetalol PRN for HTN. Afib rate controlled with diltiazem pushes. Now off Bipap, on 3L NC. Stable for stepdown. TTE pending.    Review of Systems   Constitutional: Negative for appetite change, chills and fever.   HENT: Negative for sore throat.    Respiratory: Positive for shortness of breath and wheezing.    Cardiovascular: Negative for chest pain and palpitations.   Gastrointestinal: Negative for abdominal distention, abdominal pain, constipation, diarrhea, nausea and vomiting.   Endocrine: Negative for polydipsia and polyuria.   Genitourinary: Negative for dysuria, hematuria and urgency.   Musculoskeletal: Negative for back pain and gait problem.   Neurological: Negative for weakness and numbness.     Objective:     Vital Signs (Most Recent):  Temp: 98.5 °F (36.9 °C) (01/29/22 0315)  Pulse: 92 (01/29/22 1100)  Resp: 18 (01/29/22 1100)  BP: (!) 162/91 (01/29/22 1100)  SpO2: 99 % (01/29/22 1100) Vital Signs (24h Range):  Temp:  [98.4 °F (36.9 °C)-98.5 °F (36.9 °C)] 98.5 °F (36.9 °C)  Pulse:  [] 92  Resp:  [12-35] 18  SpO2:  [90 %-100 %] 99 %  BP: (134-202)/() 162/91   Weight: 63.5 kg (139 lb 15.9 oz)  Body mass index is 21.29 kg/m².      Intake/Output Summary (Last 24 hours) at 1/29/2022 1318  Last data filed at 1/29/2022 0900  Gross per 24 hour   Intake 190.98 ml   Output 1700 ml   Net -1509.02 ml       Physical Exam  Constitutional:       Appearance: Normal appearance.   HENT:      Head: Normocephalic and atraumatic.      Nose: No congestion or rhinorrhea.      Mouth/Throat:      Mouth: Mucous membranes are moist.   Eyes:      General:         Right eye: No discharge.         Left eye: No discharge.      Conjunctiva/sclera: Conjunctivae normal.   Cardiovascular:      Rate and Rhythm: Normal rate. Rhythm irregular.      Pulses: Normal pulses.      Heart sounds: Normal heart sounds.   Pulmonary:      Effort: Respiratory distress present.      Breath  sounds: Wheezing present. No rales.   Abdominal:      General: Abdomen is flat. Bowel sounds are normal. There is no distension.      Palpations: Abdomen is soft.      Tenderness: There is no abdominal tenderness. There is no guarding or rebound.   Musculoskeletal:         General: No swelling, tenderness or deformity.      Cervical back: Normal range of motion. No muscular tenderness.      Right lower leg: No edema.      Left lower leg: No edema.   Skin:     General: Skin is warm and dry.      Capillary Refill: Capillary refill takes less than 2 seconds.   Neurological:      General: No focal deficit present.      Mental Status: He is alert and oriented to person, place, and time.         Vents:  Oxygen Concentration (%): 25 (01/29/22 0825)  Lines/Drains/Airways     Peripheral Intravenous Line                 Peripheral IV - Single Lumen 01/28/22 0415 20 G Left Hand 1 day         Peripheral IV - Single Lumen 01/28/22 0729 18 G Right Antecubital 1 day              Significant Labs:    CBC/Anemia Profile:  Recent Labs   Lab 01/28/22  0428 01/29/22  0410   WBC 10.78 6.34   HGB 13.0* 12.9*   HCT 43.0 41.1    328   MCV 84 82   RDW 19.8* 20.0*        Chemistries:  Recent Labs   Lab 01/28/22  0428 01/29/22  0518    139   K 3.8 3.9   CL 98 92*   CO2 30* 38*   BUN 7 18   CREATININE 0.8 0.9   CALCIUM 8.1* 8.9   ALBUMIN 3.2* 3.0*   PROT 7.2 6.2   BILITOT 0.4 0.5   ALKPHOS 94 82   ALT 91* 69*   AST 48* 31   MG  --  1.7       All pertinent labs within the past 24 hours have been reviewed.    Significant Imaging:  Imaging Results          US Abdomen Complete (Final result)  Result time 01/29/22 09:21:07    Final result by Alec Carmichael MD (01/29/22 09:21:07)                 Impression:      0.4 cm gallstone versus gallbladder polyp, no secondary findings to suggest acute cholecystitis.    Hepatic steatosis.    Electronically signed by resident: Joe Echavarria  Date:    01/29/2022  Time:    09:12    Electronically  signed by: Alec Carmichael MD  Date:    01/29/2022  Time:    09:21             Narrative:    EXAMINATION:  US ABDOMEN COMPLETE    CLINICAL HISTORY:  elevated liver enzymes;    TECHNIQUE:  Complete abdominal ultrasound (including pancreas, liver, gallbladder, common bile duct, spleen, aorta, IVC, and kidneys) was performed.    COMPARISON:  None    FINDINGS:  Pancreas: The visualized portions of pancreas appear normal.    Aorta: No aneurysm.    Liver: 18.2 cm, normal in size. Diffusely increased parenchymal echogenicity consistent with steatosis. No focal lesions.    Gallbladder: There is a 0.4 cm hyperechoic focus abutting the gallbladder wall without appreciable vascularity on Doppler.  No gallbladder wall thickening, hyperemia or pericholecystic fluid.  Negative sonographic Hernadez sign.    Biliary system: 3 mm common bile duct.  No intrahepatic ductal dilatation.    Inferior vena cava: Normal in appearance.    Right kidney: 11.3 cm. No hydronephrosis.    Left kidney: 10.8 cm. 2    Spleen: 8.2 x 4.0 cm.  Normal in size with homogeneous echotexture.    Miscellaneous: No ascites.                               CTA Chest Non-Coronary (PE Study) (Final result)  Result time 01/28/22 06:22:43    Final result by Alec Griffiths MD (01/28/22 06:22:43)                 Impression:      1. Examination considered not blastic to the proximal segmental pulmonary arterial level without convincing evidence of acute pulmonary embolism.  2. No acute intrathoracic findings identified.  3. Appearance of the lungs without substantial interval change when compared to most recent CTA chest performed 12/21/2021.  Redemonstrated emphysema with areas of architectural distortion nonspecific ground-glass attenuation.  Stable appearance of 12-13 mm nodule in the left lower lobe.  For multiple solid nodules with any 6 mm or greater, Fleischner Society guidelines recommend follow up with non-contrast chest CT at 3-6 months and 18-24 months  after discovery.  4. Additional details, as provided in the body report.      Electronically signed by: Alec Griffiths  Date:    01/28/2022  Time:    06:22             Narrative:    EXAMINATION:  CTA CHEST NON CORONARY    CLINICAL HISTORY:  Pulmonary embolism (PE) suspected, positive D-dimer;    TECHNIQUE:  Low dose axial images, sagittal and coronal reformations were obtained from the thoracic inlet to the lung bases following the IV administration of 75 mL of Omnipaque 350.  Contrast timing was optimized to evaluate the pulmonary arteries.  MIP images were performed.    COMPARISON:  CTA chest performed 12/21/2021.    FINDINGS:  Exam quality: Examination considered diagnostic to the proximal segmental pulmonary arterial level.    Pulmonary embolism: No acute or chronic pulmonary embolism.    Central pulmonary arteries: Normal caliber.    Aorta: Normal caliber.  Atherosclerotic calcification.    Heart: No right heart strain. Normal size.    Coronary arteries: Limited assessment.    Pericardium: Normal. No effusion, thickening, or calcification.    Support tubes and lines: None.    Base of neck/thyroid: Normal.    Lymph nodes: No supraclavicular, axillary, internal mammary, mediastinal, or hilar adenopathy.    Esophagus: Normal.    Pleura: No effusion, thickening, or calcification.    Upper abdomen: Unremarkable    Body wall: Unremarkable    Airways: Central airways are patent.  Areas of relatively smooth bronchial thickening bilaterally, most conspicuous in the lower lobes.    Lungs: Assessment of the lungs is limited due to respiratory motion.  Emphysematous changes are again appreciated.  Atelectasis and scar is noted.  Overall there appears to be no substantial interval change relative to most recent CTA chest performed 12/21/2021.  Stable appearance of 12-13 mm nodule in the left lower lobe (series 3, image 355).  Multiple additional smaller sub 4 mm nodules in both lungs also unchanged additional areas of  ground-glass attenuation and reticulation similar to prior.    Bones: No focal lesion.                               X-Ray Chest AP Portable (Final result)  Result time 01/28/22 04:47:55    Final result by Antony Samayoa MD (01/28/22 04:47:55)                 Impression:      No acute findings.    No significant change from prior study.      Electronically signed by: Antony Samayoa MD  Date:    01/28/2022  Time:    04:47             Narrative:    EXAMINATION:  XR CHEST AP PORTABLE    CLINICAL HISTORY:  shortness of breath;    TECHNIQUE:  Single frontal view of the chest was performed.    COMPARISON:  01/15/2022.    FINDINGS:  Chronic increased markings in the lungs, similar to prior.    Heart and lungs  appear unchanged when allowing for differences in technique and positioning.                                I have reviewed all pertinent imaging results/findings within the past 24 hours.

## 2022-01-29 NOTE — NURSING
1830 Patient arrived to icu via stretcher on fm o2.  AOx4. No complaints of pain. VSS.Bipap placed on patient upon arrival to unit.

## 2022-01-29 NOTE — ASSESSMENT & PLAN NOTE
Mr. Mccray is a 60 yo M with HTN, COPD on home 2 L NC, current tobacco abuse, Hx of seizure disorder,  chronic diastolic heart failure, mild alcohol use disorder presents for worsening shortness of breath for 1 week.    Patient with Hypercapnic and Hypoxic Respiratory failure which is Acute on chronic.  he is on home oxygen at 2 LPM. Supplemental oxygen was provided and noted- Oxygen Concentration (%):  [25-30] 25.   Signs/symptoms of respiratory failure include- tachypnea, increased work of breathing and respiratory distress. Contributing diagnoses includes - COPD Labs and images were reviewed. Patient Has recent ABG, which has been reviewed. Will treat underlying causes and adjust management of respiratory failure as follows    Differential includes but not limited to COPD exacerbation vs acute HF exacerbation vs CAP PNA, in the setting of medication non-adherence. Likely a component of all 3. Low suspicion for PE given CTA is negative.      - Prednisone 40mg PO daily to complete a 5 day course  - Levaquin 750mg PO daily to complete a 5 day course  - Xopenex q4h while awake and prn with IS  - Repeat VBG showed improvement of pCO2 to 64.5 from 72.9 after BiPAP usage   - Wean oxygen to 2 L NC and BiPAP QHS   - Will need close follow up with PCP and pulmonology   - Will need medications delivered at bedside upon discharge to assure adherence

## 2022-01-29 NOTE — PLAN OF CARE
CMICU DAILY GOALS       A: Awake    RASS: Goal -    Actual - RASS (Rosa Agitation-Sedation Scale): 2-->agitated   Restraint necessity:    B: Breathe   SBT: Not intubated   C: Coordinate A & B, analgesics/sedatives   Pain: managed    SAT: Not intubated  D: Delirium   CAM-ICU: Overall CAM-ICU: Negative  E: Early(intubated/ Progressive (non-intubated) Mobility   MOVE Screen: Pass   Activity: Activity Management: Patient unable to perform activities  FAS: Feeding/Nutrition   Diet order: Diet/Nutrition Received: low saturated fat/low cholesterol,2 gram sodium, Specialty Diet/Nutrition Received: other (see comments) (cardiac)  T: Thrombus   DVT prophylaxis: VTE Required Core Measure: Pharmacological prophylaxis initiated/maintained  H: HOB Elevation   Head of Bed (HOB) Positioning: HOB at 30 degrees  U: Ulcer Prophylaxis   GI: yes  G: Glucose control   managed    S: Skin                  B: Bowel Function   no issues   I: Indwelling Catheters   Lucas necessity:     CVC necessity: Yes  D: De-escalation Antibiotics   No    Family/Goals of care/Code Status   Code Status: Full Code    24H Vital Sign Range  Temp:  [98.4 °F (36.9 °C)-98.5 °F (36.9 °C)]   Pulse:  []   Resp:  [12-35]   BP: (116-202)/()   SpO2:  [87 %-100 %]      Shift Events   No acute events throughout shift. Pt on continuous bipap FIO2 30% sat 95-97%.      VS and assessment per flow sheet, patient progressing towards goals as tolerated, plan of care reviewed with self, all concerns addressed, will continue to monitor.    Papa Castro

## 2022-01-29 NOTE — ASSESSMENT & PLAN NOTE
Denies any abdominal pain, hep panel negative, LFTs normalizing  - Abdominal US with gallstone and hepatic steatosis    - Daily CMP, monitor LFTs

## 2022-01-29 NOTE — ASSESSMENT & PLAN NOTE
Previously reduced EF 10% on Echo in 3/2020, last Echo in 5/2021 with EF 55%  - Repeat TTE pending   - Lasix 40 mg BID   - Strict I/O    Recent Labs   Lab 01/28/22  2778   BNP 1,208*

## 2022-01-29 NOTE — ASSESSMENT & PLAN NOTE
"According to previous note "patient previously living in shelter with job at [employer]; reports his time at the shelter ended after 1 year now homeless with no transportation to job". Has not been able to obtain the necessary medications for his chronic conditions  -  consult   - Will need medications to be delivered at bedside prior to discharge   "

## 2022-01-29 NOTE — ASSESSMENT & PLAN NOTE
Patient with Hypercapnic Respiratory failure which is Acute on chronic.  he is on home oxygen at 2 LPM. Supplemental oxygen was provided and noted- Oxygen Concentration (%):  [25-30] 25.   Signs/symptoms of respiratory failure include- tachypnea, increased work of breathing, respiratory distress, use of accessory muscles and wheezing. Contributing diagnoses includes - COPD Labs and images were reviewed. Patient Has recent ABG, which has been reviewed. Will treat underlying causes and adjust management of respiratory failure as follows- See acute on chronic respiratory failure with hypoxia and hypercapnia

## 2022-01-29 NOTE — ASSESSMENT & PLAN NOTE
- Vitals q4h while awake  - Vitamin supplementation- Thiamine, Folic acid, Vit. B12, and Multivitamin qd  - Reports drinking 1-2 beers per day, denies any auditory or visual hallucination or hx of alcohol withdrawal seizure   - Ativan 2mg q4 PRN if : CIWA >8

## 2022-01-30 VITALS
HEART RATE: 84 BPM | HEIGHT: 68 IN | WEIGHT: 139 LBS | BODY MASS INDEX: 21.07 KG/M2 | SYSTOLIC BLOOD PRESSURE: 158 MMHG | RESPIRATION RATE: 20 BRPM | DIASTOLIC BLOOD PRESSURE: 89 MMHG | TEMPERATURE: 97 F | OXYGEN SATURATION: 98 %

## 2022-01-30 LAB — POCT GLUCOSE: 101 MG/DL (ref 70–110)

## 2022-01-30 PROCEDURE — 94640 AIRWAY INHALATION TREATMENT: CPT

## 2022-01-30 PROCEDURE — 63600175 PHARM REV CODE 636 W HCPCS: Performed by: STUDENT IN AN ORGANIZED HEALTH CARE EDUCATION/TRAINING PROGRAM

## 2022-01-30 PROCEDURE — 25000003 PHARM REV CODE 250: Performed by: STUDENT IN AN ORGANIZED HEALTH CARE EDUCATION/TRAINING PROGRAM

## 2022-01-30 PROCEDURE — 97161 PT EVAL LOW COMPLEX 20 MIN: CPT

## 2022-01-30 PROCEDURE — 97165 OT EVAL LOW COMPLEX 30 MIN: CPT

## 2022-01-30 PROCEDURE — 27000221 HC OXYGEN, UP TO 24 HOURS

## 2022-01-30 PROCEDURE — 99239 PR HOSPITAL DISCHARGE DAY,>30 MIN: ICD-10-PCS | Mod: ,,, | Performed by: INTERNAL MEDICINE

## 2022-01-30 PROCEDURE — 25000242 PHARM REV CODE 250 ALT 637 W/ HCPCS: Performed by: INTERNAL MEDICINE

## 2022-01-30 PROCEDURE — 99239 HOSP IP/OBS DSCHRG MGMT >30: CPT | Mod: ,,, | Performed by: INTERNAL MEDICINE

## 2022-01-30 PROCEDURE — 94761 N-INVAS EAR/PLS OXIMETRY MLT: CPT

## 2022-01-30 PROCEDURE — 94660 CPAP INITIATION&MGMT: CPT

## 2022-01-30 PROCEDURE — 99900035 HC TECH TIME PER 15 MIN (STAT)

## 2022-01-30 RX ORDER — FUROSEMIDE 20 MG/1
20 TABLET ORAL DAILY
Qty: 30 TABLET | Refills: 11 | Status: SHIPPED | OUTPATIENT
Start: 2022-01-30 | End: 2022-01-30 | Stop reason: SDUPTHER

## 2022-01-30 RX ORDER — PREDNISONE 10 MG/1
TABLET ORAL
Qty: 50 TABLET | Refills: 0 | Status: SHIPPED | OUTPATIENT
Start: 2022-01-30 | End: 2022-02-21

## 2022-01-30 RX ORDER — CARVEDILOL 12.5 MG/1
12.5 TABLET ORAL 2 TIMES DAILY WITH MEALS
Qty: 60 TABLET | Refills: 11 | Status: SHIPPED | OUTPATIENT
Start: 2022-01-30 | End: 2022-08-31

## 2022-01-30 RX ORDER — PREDNISONE 10 MG/1
TABLET ORAL
Qty: 50 TABLET | Refills: 0 | Status: SHIPPED | OUTPATIENT
Start: 2022-01-30 | End: 2022-01-30 | Stop reason: SDUPTHER

## 2022-01-30 RX ORDER — CARVEDILOL 12.5 MG/1
12.5 TABLET ORAL 2 TIMES DAILY WITH MEALS
Qty: 60 TABLET | Refills: 11 | Status: SHIPPED | OUTPATIENT
Start: 2022-01-30 | End: 2022-01-30 | Stop reason: SDUPTHER

## 2022-01-30 RX ORDER — LEVOFLOXACIN 750 MG/1
750 TABLET ORAL DAILY
Qty: 2 TABLET | Refills: 0 | Status: SHIPPED | OUTPATIENT
Start: 2022-01-31 | End: 2022-02-02

## 2022-01-30 RX ORDER — FUROSEMIDE 20 MG/1
20 TABLET ORAL DAILY
Qty: 30 TABLET | Refills: 11 | Status: ON HOLD | OUTPATIENT
Start: 2022-01-30 | End: 2022-07-24 | Stop reason: SDUPTHER

## 2022-01-30 RX ORDER — LEVOFLOXACIN 750 MG/1
750 TABLET ORAL DAILY
Qty: 2 TABLET | Refills: 0 | Status: SHIPPED | OUTPATIENT
Start: 2022-01-31 | End: 2022-01-30

## 2022-01-30 RX ADMIN — TIOTROPIUM BROMIDE INHALATION SPRAY 2 PUFF: 3.12 SPRAY, METERED RESPIRATORY (INHALATION) at 08:01

## 2022-01-30 RX ADMIN — MULTIPLE VITAMINS W/ MINERALS TAB 1 TABLET: TAB at 08:01

## 2022-01-30 RX ADMIN — FOLIC ACID 1 MG: 1 TABLET ORAL at 08:01

## 2022-01-30 RX ADMIN — FUROSEMIDE 40 MG: 10 INJECTION, SOLUTION INTRAMUSCULAR; INTRAVENOUS at 01:01

## 2022-01-30 RX ADMIN — LEVOFLOXACIN 750 MG: 750 TABLET, FILM COATED ORAL at 08:01

## 2022-01-30 RX ADMIN — CYANOCOBALAMIN TAB 250 MCG 250 MCG: 250 TAB at 08:01

## 2022-01-30 RX ADMIN — PREDNISONE 40 MG: 20 TABLET ORAL at 08:01

## 2022-01-30 RX ADMIN — FLUTICASONE FUROATE AND VILANTEROL TRIFENATATE 1 PUFF: 100; 25 POWDER RESPIRATORY (INHALATION) at 08:01

## 2022-01-30 RX ADMIN — FUROSEMIDE 40 MG: 10 INJECTION, SOLUTION INTRAMUSCULAR; INTRAVENOUS at 03:01

## 2022-01-30 RX ADMIN — THIAMINE HCL TAB 100 MG 100 MG: 100 TAB at 08:01

## 2022-01-30 NOTE — PLAN OF CARE
Problem: Adult Inpatient Plan of Care  Goal: Plan of Care Review  Outcome: Ongoing, Progressing  Goal: Patient-Specific Goal (Individualized)  Outcome: Ongoing, Progressing  Goal: Absence of Hospital-Acquired Illness or Injury  Outcome: Ongoing, Progressing  Goal: Optimal Comfort and Wellbeing  Outcome: Ongoing, Progressing  Goal: Readiness for Transition of Care  Outcome: Ongoing, Progressing     Problem: Skin Injury Risk Increased  Goal: Skin Health and Integrity  Outcome: Ongoing, Progressing     Problem: Gas Exchange Impaired  Goal: Optimal Gas Exchange  Outcome: Ongoing, Progressing

## 2022-01-30 NOTE — NURSING
Report received per Destini RN. No apparent distress noted to patient. Bed low, side rails up x2, call light within reach. Care assumed

## 2022-01-30 NOTE — PT/OT/SLP EVAL
Physical Therapy Evaluation and Discharge Note    Patient Name:  Baltazar Mccray   MRN:  [id number]    Recommendations:     Discharge Recommendations:  home   Discharge Equipment Recommendations: none   Barriers to discharge: None    Assessment:     Baltazar Mccray is a 61 y.o. male admitted with a medical diagnosis of Acute on chronic respiratory failure with hypoxia and hypercapnia. .  At this time, patient is functioning at their prior level of function and does not require further acute PT services.     Recent Surgery: * No surgery found *      Plan:     During this hospitalization, patient does not require further acute PT services.  Please re-consult if situation changes.      Subjective     Chief Complaint: none    Patient/Family Comments/goals: none stated   Pain/Comfort:  · Pain Rating 1: 0/10  · Pain Rating Post-Intervention 2: 0/10    Patients cultural, spiritual, Sikhism conflicts given the current situation: no    Living Environment:  Patient lives alone in an apartment on the 1st floor.     Prior to admission, patients level of function was independent.  Equipment used at home: oxygen,shower chair.  DME owned (not currently used): none.      Objective:     Communicated with RN prior to session.  Patient found supine with oxygen,peripheral IV,telemetry upon PT entry to room.    General Precautions: Standard, fall   Orthopedic Precautions:N/A   Braces: N/A   Respiratory Status: Room air    Exams:  · Cognitive Exam:  Patient is oriented to Person, Place, Time and Situation  · Sensation:    · -       Intact  · Skin Integrity/Edema:      · -       Skin integrity: Visible skin intact  · RUE ROM: WFL  · RUE Strength: WFL  · LUE ROM: WFL  · LUE Strength: WFL  · RLE ROM: WFL  · RLE Strength: WFL  · LLE ROM: WFL  · LLE Strength: WFL    Functional Mobility:  · Bed Mobility:     · Supine to Sit: independence  · Transfers:     · Sit to Stand:  independence with no AD  · Gait: Patient ambulated 15 feet independently with  no AD   · Balance: Dynamic - Good reactive postural control -- able to respond appropriately to pertubation     AM-PAC 6 CLICK MOBILITY  Total Score:24     AM-PAC 6 CLICK MOBILITY  Total Score:24     Patient left supine with all lines intact, call button in reach and RN  notified.    GOALS:   Multidisciplinary Problems     Physical Therapy Goals        Problem: Physical Therapy Goal    Goal Priority Disciplines Outcome Goal Variances Interventions   Physical Therapy Goal     PT, PT/OT                      History:     Past Medical History:   Diagnosis Date    Asthma     COPD (chronic obstructive pulmonary disease)     home O2 at night only    Coronary artery disease     Hypertension     Pneumonia     Seizures        Past Surgical History:   Procedure Laterality Date    LEFT HEART CATHETERIZATION  4/23/2020    Procedure: Left heart cath;  Surgeon: Nic Pollack MD;  Location: [facility] CATH LAB;  Service: Cardiology;;       Time Tracking:     PT Received On: 01/30/22  PT Start Time: 0924     PT Stop Time: 0929  PT Total Time (min): 5 min     Billable Minutes: Evaluation 5     Davi Hays PhD, DPT       01/30/2022

## 2022-01-30 NOTE — PLAN OF CARE
OT evaluation completed. Patient is presenting at functional baseline and is appropriate for discharge from acute skilled OT services.     Problem: Occupational Therapy Goal  Goal: Occupational Therapy Goal  Outcome: Met

## 2022-01-30 NOTE — NURSING
Patient currently awaiting transport to the arranged transportation (Lyft) secured by case management.  Medications were not delivered to bedside by pharmacy.  Dr. Mason is aware and ok'd for patient to be discharged without meds. Patient made aware of change and instructed to call pharmacy in the a.m. for further instruction.

## 2022-01-30 NOTE — PLAN OF CARE
Problem: Adult Inpatient Plan of Care  Goal: Plan of Care Review  1/30/2022 1605 by Angelina Barragan RN  Outcome: Met  1/30/2022 1208 by Angelina Barragan RN  Outcome: Ongoing, Progressing  Goal: Patient-Specific Goal (Individualized)  1/30/2022 1605 by Angelina Barragan RN  Outcome: Met  1/30/2022 1208 by Angelina Barragan RN  Outcome: Ongoing, Progressing  Goal: Absence of Hospital-Acquired Illness or Injury  1/30/2022 1605 by Angelina Barragan RN  Outcome: Met  1/30/2022 1208 by Angelina Barragan RN  Outcome: Ongoing, Progressing  Goal: Optimal Comfort and Wellbeing  1/30/2022 1605 by Angelina Barragan RN  Outcome: Met  1/30/2022 1208 by Angelina Barragan RN  Outcome: Ongoing, Progressing  Goal: Readiness for Transition of Care  1/30/2022 1605 by Angelina Barragan RN  Outcome: Met  1/30/2022 1208 by Angelina Barragan RN  Outcome: Ongoing, Progressing     Problem: Skin Injury Risk Increased  Goal: Skin Health and Integrity  1/30/2022 1605 by Angelina Barragan RN  Outcome: Met  1/30/2022 1208 by Angelina Barragan RN  Outcome: Ongoing, Progressing     Problem: Gas Exchange Impaired  Goal: Optimal Gas Exchange  1/30/2022 1605 by Angelina Barragan RN  Outcome: Met  1/30/2022 1208 by Angelina Barragan RN  Outcome: Ongoing, Progressing

## 2022-01-30 NOTE — PT/OT/SLP EVAL
"Occupational Therapy   Evaluation and Discharge Note    Name: Baltazar Mccray  MRN: [id number]  Admitting Diagnosis:  Acute on chronic respiratory failure with hypoxia and hypercapnia   Recent Surgery: * No surgery found *      Recommendations:     Discharge Recommendations: home  Discharge Equipment Recommendations:  none  Barriers to discharge:  None    Assessment:     Baltazar Mccray is a 61 y.o. male with a medical diagnosis of Acute on chronic respiratory failure with hypoxia and hypercapnia. At this time, patient is functioning at their prior level of function and does not require further acute OT services.     Patient completed bed mobility, sit>stand transfers, functional mobility of household distance, and toilet transfers with [place] using no AD.     Plan:     During this hospitalization, patient does not require further acute OT services.  Please re-consult if situation changes.    · Plan of Care Reviewed with: patient    Subjective   Patient states: "I don't need therapy. I'll show you."    Chief Complaint: SOB  Patient/Family Comments/goals: to return home at [facility]    Occupational Profile:  Living Environment: lives alone in 1st floor [address]; tub/shower combo with grab bars and shower chair  Previous level of function: Independent; on 2L of oxygen at home  Equipment Used at home:  oxygen,shower chair,grab bar  Assistance upon Discharge: friend visits often and able to assist    Pain/Comfort:  · Pain Rating 1: 0/10  · Pain Rating Post-Intervention 1: 0/10    Patients cultural, spiritual, Roman Catholic conflicts given the current situation: no    Objective:     Communicated with: Nurse Alfaro prior to session.  Patient found HOB elevated with oxygen,telemetry,peripheral IV upon OT entry to room.    General Precautions: Standard, fall   Orthopedic Precautions:N/A   Braces: N/A  Respiratory Status: Nasal cannula, flow 2 L/min     Occupational Performance:    Bed Mobility:    · Patient completed Rolling/Turning " to Left with  independence  · Patient completed Scooting/Bridging with independence  · Patient completed Supine to Sit with independence  · Patient completed Sit to Supine with independence    Functional Mobility/Transfers:  · Patient completed Sit <> Stand Transfer with independence  with  no assistive device   · Patient completed Toilet Transfer Step Transfer technique with independence with  no AD  · Functional Mobility: Patient completed functional mobility of household distance ~40ft with [place] using no AD while on 2L oxygen via extended tubing    Activities of Daily Living:  · Toileting: independence to facilitate toileting tasks   · Declined additional ADL needs    Cognitive/Visual Perceptual:  Cognitive/Psychosocial Skills:     -       Oriented to: Person, Place, Time and Situation   -       Follows Commands/attention:Follows multistep  commands  -       Communication: clear/fluent  -       Memory: No Deficits noted  -       Safety awareness/insight to disability: intact   -       Mood/Affect/Coping skills/emotional control: Appropriate to situation  Visual/Perceptual:      -Intact     Physical Exam:  Balance:    -       Good static/dynamic stance  Dominant hand:    -       R hand  Upper Extremity Range of Motion:     -       Right Upper Extremity: WFL  -       Left Upper Extremity: WFL  Upper Extremity Strength:    -       Right Upper Extremity: WFL  -       Left Upper Extremity: WFL   Strength:    -       Right Upper Extremity: WFL  -       Left Upper Extremity: WFL  Fine Motor Coordination:    -       Intact  Gross motor coordination:   WFL    AMPAC 6 Click ADL:  AMPAC Total Score: 24    Treatment & Education:  -Pt education on OT role and POC.  -Importance of E/OOB activity  -Safety during functional transfer and mobility ensured  -Patient provided with education on importance of Bilateral UB/LB integration during functional tasks for improvement in functional performance.   -Education  provided/reviewed, questions answered within OT scope of practice.   -Patient demonstrates understanding and learning this date.       Education:    Patient left HOB elevated with all lines intact and call button in reach    GOALS:   Multidisciplinary Problems     Occupational Therapy Goals     Not on file          Multidisciplinary Problems (Resolved)        Problem: Occupational Therapy Goal    Goal Priority Disciplines Outcome Interventions   Occupational Therapy Goal   (Resolved)     OT, PT/OT Met                    History:     Past Medical History:   Diagnosis Date    Asthma     COPD (chronic obstructive pulmonary disease)     home O2 at night only    Coronary artery disease     Hypertension     Pneumonia     Seizures        Past Surgical History:   Procedure Laterality Date    LEFT HEART CATHETERIZATION  4/23/2020    Procedure: Left heart cath;  Surgeon: Nic Pollack MD;  Location: [facility] CATH LAB;  Service: Cardiology;;       Time Tracking:     OT Date of Treatment: 01/30/22  OT Start Time: 0924  OT Stop Time: 0929  OT Total Time (min): 5 min    Billable Minutes:Evaluation 5    1/30/2022

## 2022-01-31 ENCOUNTER — PATIENT OUTREACH (OUTPATIENT)
Dept: ADMINISTRATIVE | Facility: CLINIC | Age: 62
End: 2022-01-31
Payer: MEDICAID

## 2022-01-31 NOTE — PROGRESS NOTES
C3 nurse spoke with Baltazar Mccray  for a TCC post hospital discharge follow up call. The patient has a scheduled HOSFU appointment with Susan Rae on 02/09/2022 @ D.Message sent to Lisseth for sooner appt.

## 2022-01-31 NOTE — PATIENT INSTRUCTIONS
Patient Education       Shortness of Breath (Dyspnea) Discharge Instructions   About this topic   Trouble breathing is known as dyspnea. It is also known as shortness of breath or SOB. You may feel like you do not get enough air when you breathe. Many things can make you feel short of breath. Lung problems, like asthma or chronic obstructive pulmonary disease (COPD) or a blood clot in your lungs, can cause shortness of breath. So can a heart attack or heart failure when your heart doesnt work as well as it should. A serious allergic reaction can also cause very bad breathing problems. Treatment depends on the specific cause of trouble breathing.     What care is needed at home?   · Ask your doctor what you need to do when you go home. Make sure you ask questions if you do not understand what the doctor says. This way you will know what you need to do.  · Keep a diary of your signs. Write down when you have trouble breathing and what you were doing before it happened. This can help figure out what things affect your breathing. Then, you may be able to avoid them.  · Do not smoke or be in smoke-filled places. Avoid things that may cause breathing problems like fumes, pollution, dust, and other common allergens.  · Do coughing and deep breathing exercises to help keep your lungs clear.  · If you have medicines to take when you are feeling short of breath, be sure to carry them with you. Then, you can take them when needed.  What follow-up care is needed?   · Your doctor may ask you to make visits to the office to check on your progress. Be sure to keep these visits.  · Your doctor may order more tests if trouble breathing comes back. The results will help your doctor understand what health problem caused your SOB. Together you can make a plan for more care.  What drugs may be needed?   The doctor may order drugs to:  · Open the lung passages  · Relax the airways  · Help with swelling  · Control coughing  · Help you  relax  · Fight an infection  · Prevent blood clots  Take all your drugs as directed by your doctor. Do not stop taking your drugs without talking to your doctor first. Do not share or take other drugs. Get refills before you run out.  Will physical activity be limited?   · Ask your doctor about exercise. Some exercises may not be safe for you. Talk to your doctor about the right amount of activity for you.  · Stay away from activities that make it hard to breathe. Get enough rest until breathing returns to normal.  What problems could happen?   · Breathing problems get worse  · Heart failure  · Lung failure  What can be done to prevent this health problem?   · Avoid tight clothing.  · Practice good body posture.  · Rest when you feel out of breath.  · Take drugs before activity if ordered.  When do I need to call the doctor?   · You have signs of a heart attack, which may include:  ? Severe chest pain, pressure, or discomfort with:  § Breathing trouble; sweating; upset stomach; or cold, clammy skin.  § Pain in your arms, back, or jaw.  § Worse pain with activity like walking up stairs.  ? Fast or irregular heartbeat.  ? Feeling dizzy, faint, or weak.  · You are having so much trouble breathing that you can only say one or two words at a time.  · You need to sit upright at all times to be able to breathe or cannot lie down.  · You develop swelling of your tongue, lips, or throat.  · You feel your shortness of breath is slowly getting worse.  · You are feeling weak or more short of breath than usual when doing your activities.  · You have a fever of 100.4°F (38°C) or higher, are coughing up mucus, have leg swelling, or hives.  · You have gain more than 2 to 3 pounds (1 to 1.5 kg) overnight or 3 to 5 pounds (1.5 to 2.5 kg) in a week.  Teach Back: Helping You Understand   The Teach Back Method helps you understand the information we are giving you. After you talk with the staff, tell them in your own words what you  learned. This helps to make sure the staff has described each thing clearly. It also helps to explain things that may have been confusing. Before going home, make sure you can do these:  · I can tell you about my condition.  · I can tell you what may help ease my breathing.  · I can tell you what I can do to help avoid passing the infection to others.  · I can tell you what I will do if I have trouble breathing, chest pain, weight gain, or swelling.  Where can I learn more?   FamilyDoctor.org  http://familydoctor.org/familydoctor/en/diseases-conditions/shortness-of-breath.html   NHS Choices  https://www.nhs.uk/conditions/shortness-of-breath/   Last Reviewed Date   2021-06-08  Consumer Information Use and Disclaimer   This information is not specific medical advice and does not replace information you receive from your health care provider. This is only a brief summary of general information. It does NOT include all information about conditions, illnesses, injuries, tests, procedures, treatments, therapies, discharge instructions or life-style choices that may apply to you. You must talk with your health care provider for complete information about your health and treatment options. This information should not be used to decide whether or not to accept your health care providers advice, instructions or recommendations. Only your health care provider has the knowledge and training to provide advice that is right for you.  Copyright   Copyright © 2021 UpToDate, Inc. and its affiliates and/or licensors. All rights reserved.    [facility] teaching reviewed with Baltazar Mccray  . Baltazar Mccray  verbalized understanding.    Education was provided based on the patient's discharge diagnosis using the attached [facility] patient education as a reference.

## 2022-01-31 NOTE — PLAN OF CARE
[facility] - Telemetry Stepdown  Discharge Final Note    Primary Care Provider: Jaden    Expected Discharge Date: 1/30/2022     Patient discharged 1/30/2022 home with no needs. Patient to make hospital follow up appointment with [facility].        Final Discharge Note (most recent)     Final Note - 01/31/22 0906        Final Note    Assessment Type Final Discharge Note     Anticipated Discharge Disposition Home or Self Care        Post-Acute Status    Post-Acute Authorization Other     Other Status No Post-Acute Service Needs     Discharge Delays None known at this time                 Important Message from Medicare             Contact Info     Jaden   Relationship: PCP - General    [address]  [facility] [address]   Phone: [phone]       Next Steps: Follow up in 1 week(s)    Instructions: for hospital follow up    [facility] - Pulmonary Sv[facility] 9[address] Fl   Specialty: Pulmonology    [address]  [facility] [address]   Phone: [phone]       Next Steps: Follow up in 1 month(s)    Instructions: hosptial follow up          Dilcia Sharma RN     [phone]

## 2022-02-07 ENCOUNTER — HOSPITAL ENCOUNTER (EMERGENCY)
Facility: HOSPITAL | Age: 62
Discharge: HOME | End: 2022-02-07
Attending: EMERGENCY MEDICINE
Payer: MEDICAID

## 2022-02-07 VITALS
DIASTOLIC BLOOD PRESSURE: 95 MMHG | SYSTOLIC BLOOD PRESSURE: 183 MMHG | TEMPERATURE: 99 F | RESPIRATION RATE: 34 BRPM | OXYGEN SATURATION: 99 % | HEART RATE: 60 BPM

## 2022-02-07 DIAGNOSIS — R11.0 NAUSEA: Primary | ICD-10-CM

## 2022-02-07 DIAGNOSIS — J44.9 CHRONIC OBSTRUCTIVE PULMONARY DISEASE, UNSPECIFIED COPD TYPE: ICD-10-CM

## 2022-02-07 LAB
ALBUMIN SERPL BCP-MCNC: 3.2 G/DL (ref 3.5–5.2)
ALLENS TEST: ABNORMAL
ALP SERPL-CCNC: 64 U/L (ref 55–135)
ALT SERPL W/O P-5'-P-CCNC: 101 U/L (ref 10–44)
ANION GAP SERPL CALC-SCNC: 12 MMOL/L (ref 8–16)
AST SERPL-CCNC: 58 U/L (ref 10–40)
BASOPHILS # BLD AUTO: 0.01 K/UL (ref 0–0.2)
BASOPHILS NFR BLD: 0.1 % (ref 0–1.9)
BILIRUB SERPL-MCNC: 0.5 MG/DL (ref 0.1–1)
BNP SERPL-MCNC: 355 PG/ML (ref 0–99)
BUN SERPL-MCNC: 21 MG/DL (ref 6–20)
BUN SERPL-MCNC: 24 MG/DL (ref 6–30)
CALCIUM SERPL-MCNC: 11.1 MG/DL (ref 8.7–10.5)
CHLORIDE SERPL-SCNC: 79 MMOL/L (ref 95–110)
CHLORIDE SERPL-SCNC: 85 MMOL/L (ref 95–110)
CO2 SERPL-SCNC: 43 MMOL/L (ref 23–29)
CREAT SERPL-MCNC: 0.8 MG/DL (ref 0.5–1.4)
CREAT SERPL-MCNC: 1.1 MG/DL (ref 0.5–1.4)
CTP QC/QA: YES
DELSYS: ABNORMAL
DIFFERENTIAL METHOD: ABNORMAL
EOSINOPHIL # BLD AUTO: 0 K/UL (ref 0–0.5)
EOSINOPHIL NFR BLD: 0 % (ref 0–8)
ERYTHROCYTE [DISTWIDTH] IN BLOOD BY AUTOMATED COUNT: 19.7 % (ref 11.5–14.5)
EST. GFR  (AFRICAN AMERICAN): >60 ML/MIN/1.73 M^2
EST. GFR  (NON AFRICAN AMERICAN): >60 ML/MIN/1.73 M^2
FLOW: 2
GLUCOSE SERPL-MCNC: 113 MG/DL (ref 70–110)
GLUCOSE SERPL-MCNC: 128 MG/DL (ref 70–110)
HCO3 UR-SCNC: 42.5 MMOL/L (ref 24–28)
HCT VFR BLD AUTO: 44.2 % (ref 40–54)
HCT VFR BLD CALC: 47 %PCV (ref 36–54)
HGB BLD-MCNC: 14.1 G/DL (ref 14–18)
IMM GRANULOCYTES # BLD AUTO: 0.08 K/UL (ref 0–0.04)
IMM GRANULOCYTES NFR BLD AUTO: 1.1 % (ref 0–0.5)
LYMPHOCYTES # BLD AUTO: 0.6 K/UL (ref 1–4.8)
LYMPHOCYTES NFR BLD: 9.1 % (ref 18–48)
MCH RBC QN AUTO: 26 PG (ref 27–31)
MCHC RBC AUTO-ENTMCNC: 31.9 G/DL (ref 32–36)
MCV RBC AUTO: 81 FL (ref 82–98)
MODE: ABNORMAL
MONOCYTES # BLD AUTO: 0.3 K/UL (ref 0.3–1)
MONOCYTES NFR BLD: 3.5 % (ref 4–15)
NEUTROPHILS # BLD AUTO: 6.1 K/UL (ref 1.8–7.7)
NEUTROPHILS NFR BLD: 86.2 % (ref 38–73)
NRBC BLD-RTO: 1 /100 WBC
PCO2 BLDA: 40.6 MMHG (ref 35–45)
PH SMN: 7.63 [PH] (ref 7.35–7.45)
PLATELET # BLD AUTO: 283 K/UL (ref 150–450)
PMV BLD AUTO: 10.1 FL (ref 9.2–12.9)
PO2 BLDA: 85 MMHG (ref 40–60)
POC BE: 21 MMOL/L
POC IONIZED CALCIUM: 1.08 MMOL/L (ref 1.06–1.42)
POC SATURATED O2: 98 % (ref 95–100)
POC TCO2 (MEASURED): 41 MMOL/L (ref 23–29)
POC TCO2: 44 MMOL/L (ref 24–29)
POTASSIUM BLD-SCNC: 3.8 MMOL/L (ref 3.5–5.1)
POTASSIUM SERPL-SCNC: 3.9 MMOL/L (ref 3.5–5.1)
PROT SERPL-MCNC: 6.7 G/DL (ref 6–8.4)
RBC # BLD AUTO: 5.43 M/UL (ref 4.6–6.2)
SAMPLE: ABNORMAL
SAMPLE: ABNORMAL
SARS-COV-2 RDRP RESP QL NAA+PROBE: NEGATIVE
SITE: ABNORMAL
SODIUM BLD-SCNC: 130 MMOL/L (ref 136–145)
SODIUM SERPL-SCNC: 134 MMOL/L (ref 136–145)
TROPONIN I SERPL DL<=0.01 NG/ML-MCNC: 0.05 NG/ML (ref 0–0.03)
TROPONIN I SERPL DL<=0.01 NG/ML-MCNC: 0.07 NG/ML (ref 0–0.03)
WBC # BLD AUTO: 7.07 K/UL (ref 3.9–12.7)

## 2022-02-07 PROCEDURE — 85025 COMPLETE CBC W/AUTO DIFF WBC: CPT | Performed by: EMERGENCY MEDICINE

## 2022-02-07 PROCEDURE — 82803 BLOOD GASES ANY COMBINATION: CPT

## 2022-02-07 PROCEDURE — 27000221 HC OXYGEN, UP TO 24 HOURS

## 2022-02-07 PROCEDURE — U0002 COVID-19 LAB TEST NON-CDC: HCPCS | Performed by: EMERGENCY MEDICINE

## 2022-02-07 PROCEDURE — 63600175 PHARM REV CODE 636 W HCPCS: Performed by: EMERGENCY MEDICINE

## 2022-02-07 PROCEDURE — 99284 EMERGENCY DEPT VISIT MOD MDM: CPT | Mod: ,,, | Performed by: EMERGENCY MEDICINE

## 2022-02-07 PROCEDURE — 94761 N-INVAS EAR/PLS OXIMETRY MLT: CPT

## 2022-02-07 PROCEDURE — 84484 ASSAY OF TROPONIN QUANT: CPT | Mod: 91 | Performed by: EMERGENCY MEDICINE

## 2022-02-07 PROCEDURE — 96375 TX/PRO/DX INJ NEW DRUG ADDON: CPT

## 2022-02-07 PROCEDURE — 93010 EKG 12-LEAD: ICD-10-PCS | Mod: ,,, | Performed by: INTERNAL MEDICINE

## 2022-02-07 PROCEDURE — 94640 AIRWAY INHALATION TREATMENT: CPT

## 2022-02-07 PROCEDURE — 99285 EMERGENCY DEPT VISIT HI MDM: CPT | Mod: 25

## 2022-02-07 PROCEDURE — 83880 ASSAY OF NATRIURETIC PEPTIDE: CPT | Performed by: EMERGENCY MEDICINE

## 2022-02-07 PROCEDURE — 25000003 PHARM REV CODE 250: Performed by: EMERGENCY MEDICINE

## 2022-02-07 PROCEDURE — 80053 COMPREHEN METABOLIC PANEL: CPT | Performed by: EMERGENCY MEDICINE

## 2022-02-07 PROCEDURE — 99284 PR EMERGENCY DEPT VISIT,LEVEL IV: ICD-10-PCS | Mod: ,,, | Performed by: EMERGENCY MEDICINE

## 2022-02-07 PROCEDURE — 96365 THER/PROPH/DIAG IV INF INIT: CPT

## 2022-02-07 PROCEDURE — 93010 ELECTROCARDIOGRAM REPORT: CPT | Mod: ,,, | Performed by: INTERNAL MEDICINE

## 2022-02-07 PROCEDURE — 99900035 HC TECH TIME PER 15 MIN (STAT)

## 2022-02-07 PROCEDURE — 93005 ELECTROCARDIOGRAM TRACING: CPT

## 2022-02-07 PROCEDURE — 82330 ASSAY OF CALCIUM: CPT

## 2022-02-07 PROCEDURE — 25000242 PHARM REV CODE 250 ALT 637 W/ HCPCS: Performed by: EMERGENCY MEDICINE

## 2022-02-07 PROCEDURE — 80047 BASIC METABLC PNL IONIZED CA: CPT

## 2022-02-07 RX ORDER — IPRATROPIUM BROMIDE AND ALBUTEROL SULFATE 2.5; .5 MG/3ML; MG/3ML
3 SOLUTION RESPIRATORY (INHALATION)
Status: COMPLETED | OUTPATIENT
Start: 2022-02-07 | End: 2022-02-07

## 2022-02-07 RX ORDER — ALBUTEROL SULFATE 90 UG/1
AEROSOL, METERED RESPIRATORY (INHALATION)
COMMUNITY
Start: 2022-01-26 | End: 2022-02-07

## 2022-02-07 RX ORDER — TIOTROPIUM BROMIDE 18 UG/1
1 CAPSULE ORAL; RESPIRATORY (INHALATION) DAILY
Qty: 1 CAPSULE | Refills: 0 | Status: SHIPPED | OUTPATIENT
Start: 2022-02-07 | End: 2022-02-21 | Stop reason: SDUPTHER

## 2022-02-07 RX ORDER — ALBUTEROL SULFATE 90 UG/1
1-2 AEROSOL, METERED RESPIRATORY (INHALATION) EVERY 6 HOURS PRN
Qty: 18 G | Refills: 0 | Status: ON HOLD | OUTPATIENT
Start: 2022-02-07 | End: 2022-03-16 | Stop reason: SDUPTHER

## 2022-02-07 RX ORDER — BUDESONIDE, GLYCOPYRROLATE, AND FORMOTEROL FUMARATE 160; 9; 4.8 UG/1; UG/1; UG/1
2 AEROSOL, METERED RESPIRATORY (INHALATION) 2 TIMES DAILY
Qty: 10.7 G | Refills: 0 | Status: SHIPPED | OUTPATIENT
Start: 2022-02-07 | End: 2022-02-21 | Stop reason: SDUPTHER

## 2022-02-07 RX ORDER — ONDANSETRON 2 MG/ML
4 INJECTION INTRAMUSCULAR; INTRAVENOUS
Status: COMPLETED | OUTPATIENT
Start: 2022-02-07 | End: 2022-02-07

## 2022-02-07 RX ORDER — TIOTROPIUM BROMIDE 18 UG/1
1 CAPSULE ORAL; RESPIRATORY (INHALATION) DAILY
COMMUNITY
Start: 2022-02-02 | End: 2022-02-07 | Stop reason: SDUPTHER

## 2022-02-07 RX ORDER — IPRATROPIUM BROMIDE AND ALBUTEROL SULFATE 2.5; .5 MG/3ML; MG/3ML
3 SOLUTION RESPIRATORY (INHALATION) EVERY 4 HOURS PRN
Qty: 180 ML | Refills: 0 | Status: SHIPPED | OUTPATIENT
Start: 2022-02-07 | End: 2022-02-21 | Stop reason: SDUPTHER

## 2022-02-07 RX ADMIN — PROMETHAZINE HYDROCHLORIDE 12.5 MG: 25 INJECTION INTRAMUSCULAR; INTRAVENOUS at 11:02

## 2022-02-07 RX ADMIN — IPRATROPIUM BROMIDE AND ALBUTEROL SULFATE 3 ML: .5; 2.5 SOLUTION RESPIRATORY (INHALATION) at 09:02

## 2022-02-07 RX ADMIN — ONDANSETRON 4 MG: 2 INJECTION INTRAMUSCULAR; INTRAVENOUS at 08:02

## 2022-02-07 NOTE — ED TRIAGE NOTES
Pt states that he woke up at 4 today, as he always does, and took his medicine at 5am. He started feeling badly shortly after that. He states that he is SOB and nauseous. He always wears 2L of O2

## 2022-02-07 NOTE — ED NOTES
I-STAT Chem-8+ Results:   Value Reference Range   Sodium 130 136-145 mmol/L   Potassium  3.8 3.5-5.1 mmol/L   Chloride 85  mmol/L   Ionized Calcium 1.08 1.06-1.42 mmol/L   CO2 (measured) 41 23-29 mmol/L   Glucose 128  mg/dL   BUN 24 6-30 mg/dL   Creatinine 1.1 0.5-1.4 mg/dL   Hematocrit 47 36-54%

## 2022-02-07 NOTE — ED PROVIDER NOTES
"Encounter Date: 2/7/2022       History     Chief Complaint   Patient presents with    Dizziness     Thinks he took too many of his medications but states unsure which medication      62 yo M with pmhx COPD on 2L home O2, CAD, HTN, seizure disorder, EtOH use, HFpEF (Grade I DD, PA pressure 50 mmHg) presents with a chief complaint of I feel sick." Patient known to myself.  I admitted him to the hospital last week for COPD exacerbation.  No discharge summary was available at time of this ED encounter but it appears he was discharged 1 week ago.  Patient reports that he awoke at 4:00 a.m. this morning feeling fine.  He took all his medications and then began feeling sick at 5:00 a.m..  Symptoms have been constant since onset.  He reports lightheadedness nausea.  He believes he took too many of 1 of his medications.  Is uncertain which medicine he took too much of. He does not use a pill organizer. She has shortness of breath, which is recurrent. No chest pain, abdominal pain. No history of similar symptoms.  He believes his medications were adjusted after recent hospitalization.  He is uncertain all other names and doses.  Home meds reviewed by myself.        Review of patient's allergies indicates:   Allergen Reactions    Benazepril Swelling    Duoneb [ipratropium-albuterol]      Report minor Tremors, pt seems to be tolerating well.     Past Medical History:   Diagnosis Date    Asthma     COPD (chronic obstructive pulmonary disease)     home O2 at night only    Coronary artery disease     Hypertension     Pneumonia     Seizures      Past Surgical History:   Procedure Laterality Date    LEFT HEART CATHETERIZATION  4/23/2020    Procedure: Left heart cath;  Surgeon: Nic Pollack MD;  Location: [facility] CATH LAB;  Service: Cardiology;;     Family History   Problem Relation Age of Onset    Cancer Father     Hypertension Sister     Stroke Sister     Stroke Brother     Diabetes Neg Hx     Heart disease Neg " Hx      Social History     Tobacco Use    Smoking status: Current Some Day Smoker     Packs/day: 0.25     Types: Cigarettes    Smokeless tobacco: Never Used    Tobacco comment: 3-4 per day   Substance Use Topics    Alcohol use: Yes     Alcohol/week: 2.0 standard drinks     Types: 2 Cans of beer per week     Comment: every night    Drug use: No     Review of Systems   Constitutional: Negative for fever.   HENT: Negative for sore throat.    Respiratory: Positive for shortness of breath (at baseline). Negative for cough.    Cardiovascular: Negative for chest pain.   Gastrointestinal: Positive for nausea. Negative for abdominal pain and vomiting.   Genitourinary: Negative for dysuria.   Musculoskeletal: Negative for back pain.   Skin: Negative for rash.   Neurological: Positive for light-headedness. Negative for weakness.   Hematological: Does not bruise/bleed easily.       Physical Exam     Initial Vitals [02/07/22 0756]   BP Pulse Resp Temp SpO2   (!) 140/76 64 20 98.7 °F (37.1 °C) 98 %      MAP       --         Physical Exam    Nursing note and vitals reviewed.  Constitutional: He appears well-nourished. He is not diaphoretic.   Anxious, sitting upright on edge of stretcher   HENT:   Head: Normocephalic and atraumatic.   Eyes: No scleral icterus.   Neck:   Normal range of motion.  Cardiovascular: Normal rate, regular rhythm and normal heart sounds. Exam reveals no gallop and no friction rub.    No murmur heard.  Pulmonary/Chest: No respiratory distress. He has wheezes. He has no rhonchi. He has no rales. He exhibits no tenderness.   Mild tachypnea on 2 L nasal cannula with scattered wheezes in mild distress   Abdominal: Abdomen is soft. He exhibits no distension and no mass. There is no abdominal tenderness. There is no rebound and no guarding.   Musculoskeletal:         General: No tenderness. Normal range of motion.      Cervical back: Normal range of motion.     Neurological: He is alert and oriented to  person, place, and time.   Skin: Skin is warm and dry. Capillary refill takes less than 2 seconds.   Psychiatric: Thought content normal.   anxious         ED Course   Procedures  Labs Reviewed   TROPONIN I - Abnormal; Notable for the following components:       Result Value    Troponin I 0.068 (*)     All other components within normal limits   TROPONIN I - Abnormal; Notable for the following components:    Troponin I 0.054 (*)     All other components within normal limits   COMPREHENSIVE METABOLIC PANEL - Abnormal; Notable for the following components:    Sodium 134 (*)     Chloride 79 (*)     CO2 43 (*)     Glucose 113 (*)     BUN 21 (*)     Calcium 11.1 (*)     Albumin 3.2 (*)     AST 58 (*)      (*)     All other components within normal limits   B-TYPE NATRIURETIC PEPTIDE - Abnormal; Notable for the following components:     (*)     All other components within normal limits   CBC W/ AUTO DIFFERENTIAL - Abnormal; Notable for the following components:    MCV 81 (*)     MCH 26.0 (*)     MCHC 31.9 (*)     RDW 19.7 (*)     Immature Granulocytes 1.1 (*)     Immature Grans (Abs) 0.08 (*)     Lymph # 0.6 (*)     nRBC 1 (*)     Gran % 86.2 (*)     Lymph % 9.1 (*)     Mono % 3.5 (*)     All other components within normal limits   ISTAT PROCEDURE - Abnormal; Notable for the following components:    POC PH 7.628 (*)     POC PO2 85 (*)     POC HCO3 42.5 (*)     POC TCO2 44 (*)     All other components within normal limits   ISTAT PROCEDURE - Abnormal; Notable for the following components:    POC Glucose 128 (*)     POC Sodium 130 (*)     POC Chloride 85 (*)     POC TCO2 (MEASURED) 41 (*)     All other components within normal limits   SARS-COV-2 RDRP GENE     EKG Readings: (Independently Interpreted)   Initial Reading: No STEMI. Previous EKG: Compared with most recent EKG Rhythm: Normal Sinus Rhythm. Heart Rate: 71. ST Segments: Normal ST Segments. T Waves Elevated: II, III, AVF, V3 and V4. Axis: Normal.      ECG Results          EKG 12-lead (In process)  Result time 02/07/22 11:29:35    In process by Interface, Lab In [facility] (02/07/22 11:29:35)                 Narrative:    Test Reason : R07.9,    Vent. Rate : 071 BPM     Atrial Rate : 071 BPM     P-R Int : 160 ms          QRS Dur : 084 ms      QT Int : 418 ms       P-R-T Axes : 079 082 077 degrees     QTc Int : 454 ms    Normal sinus rhythm  Right atrial enlargement  Septal infarct ,age undetermined  Abnormal ECG  When compared with ECG of 28-JAN-2022 07:59,  Sinus rhythm has replaced Atrial fibrillation  Vent. rate has decreased BY  57 BPM  QRS duration has increased  ST elevation now present in Anterior leads    Referred By: [email or web address]   SELF           Confirmed By:                             Imaging Results          X-Ray Chest AP Portable (Final result)  Result time 02/07/22 09:21:27    Final result by Angelo Orellana MD (02/07/22 09:21:27)                 Impression:      No detrimental interval change compared 01/28/2022.      Electronically signed by: Angelo Orellana  Date:    02/07/2022  Time:    09:21             Narrative:    EXAMINATION:  XR CHEST AP PORTABLE    CLINICAL HISTORY:  nausea;    TECHNIQUE:  Single frontal view of the chest was performed.    COMPARISON:  Radiograph 01/28/2022, CT chest 01/28/2022    FINDINGS:  Mediastinal structures are midline.  Stable cardiomediastinal silhouette.  Aortic vascular calcifications.    Bilateral pulmonary lucencies consistent with emphysema.  No new focal airspace opacity.  No evidence of large pleural effusion or pneumothorax.    Osseous structures appear intact.                                 Medications   ondansetron injection 4 mg (4 mg Intravenous Given 2/7/22 0824)   albuterol-ipratropium 2.5 mg-0.5 mg/3 mL nebulizer solution 3 mL (3 mLs Nebulization Given 2/7/22 0921)   promethazine (PHENERGAN) 12.5 mg in dextrose 5 % 50 mL IVPB (0 mg Intravenous Stopped 2/7/22 1150)     Medical Decision Making:  "  History:   Old Medical Records: I decided to obtain old medical records.  Initial Assessment:   62 yo M with pmhx COPD on 2L home O2, CAD, HTN, seizure disorder, EtOH use, HFpEF (Grade I DD, PA pressure 50 mmHg) presents with a chief complaint of "I feel sick."  Differential Diagnosis:   Medication side effect, hyperkalemia, electrolyte abnormality, ACS, hypercapnia, COPD exacerbation, COVID, allergic reaction  Clinical Tests:   Lab Tests: Ordered  Radiological Study: Ordered  Medical Tests: Ordered  ED Management:  Will administer Zofran and breathing treatment.  Will obtain labs and chest x-ray.    Reassessment:  COVID negative.  I-STAT Chem 8 with no emergent abnormalities.  VBG with pH 7.628, pCO2 of 40.6.  Patient is a chronic retainer, noted bicarb of 42.5.  Patient is acutely alkalotic due to hyperventilation.  Troponin mildly elevated at 0.068 but decreased from baseline.  Chest x-ray at baseline with chronic emphysematous changes.  On repeat assessment, patient reports feeling overall much improved but still nauseated.  Will administer Phenergan.  Awaiting additional labs.    Reassessment:  Troponin 0.054 which is significantly decreased from baseline.  CMP with mild hyponatremia of 134 hypokalemia of 79. Bicarb is elevated at 43 which is consistent with his baseline.  LFTs slightly elevated but overall consistent with previous.  BNP of 385 is improved from previous.  On repeat assessment, patient reports feeling improved and symptoms have resolved and requests to eat.  Will p.o. challenge.    Reassessment:  Patient tolerated p.o. and requests to leave.  He remains breathing comfortably on 2 L. No tachypnea despite is noted in vital signs.  He wishes to leave.  Will refill scripts as requested.  Patient informed at length but no emergent cause of his symptoms was found today but provided with extensive return precautions.                        Clinical Impression:   Final diagnoses:  [R11.0] Nausea " (Primary)          ED Disposition Condition    Discharge Stable        ED Prescriptions     Medication Sig Dispense Start Date End Date Auth. Provider    albuterol-ipratropium (DUO-NEB) 2.5 mg-0.5 mg/3 mL nebulizer solution Take 3 mLs by nebulization every 4 (four) hours as needed for Wheezing or Shortness of Breath. Rescue 180 mL 2/7/2022 2/7/2023 Chele Stroud MD    budesonide-glycopyr-formoterol (BREZTRI AEROSPHERE) 160-9-4.8 mcg/actuation HFAA Inhale 2 puffs into the lungs 2 (two) times daily. Rinse mouth after use. 10.7 g 2/7/2022  Chele Stroud MD    SPIRIVA WITH HANDIHALER 18 mcg inhalation capsule Inhale 1 capsule (18 mcg total) into the lungs once daily. 1 capsule 2/7/2022  Chele Stroud MD    albuterol (PROVENTIL/VENTOLIN HFA) 90 mcg/actuation inhaler Inhale 1-2 puffs into the lungs every 6 (six) hours as needed for Wheezing. Rescue 6.7 g 2/7/2022 2/7/2023 Chele Stroud MD        Follow-up Information     Follow up With Specialties Details Why Contact Info    [facility]    [address]  [facility] [address]  [phone]      [facility] - Emergency Dept Emergency Medicine  As needed, If symptoms worsen [address]  [facility] [address] [phone]           Chele Stroud MD  02/07/22 3733

## 2022-02-07 NOTE — DISCHARGE INSTRUCTIONS
No emergent cause of your symptoms was found.   Be sure to take your medications as prescribed.  Return to the emergency department for any worsening symptoms.

## 2022-02-08 PROCEDURE — 93005 ELECTROCARDIOGRAM TRACING: CPT

## 2022-02-08 PROCEDURE — 93010 ELECTROCARDIOGRAM REPORT: CPT | Mod: ,,, | Performed by: STUDENT IN AN ORGANIZED HEALTH CARE EDUCATION/TRAINING PROGRAM

## 2022-02-08 PROCEDURE — 93010 EKG 12-LEAD: ICD-10-PCS | Mod: ,,, | Performed by: STUDENT IN AN ORGANIZED HEALTH CARE EDUCATION/TRAINING PROGRAM

## 2022-02-08 PROCEDURE — 99285 PR EMERGENCY DEPT VISIT,LEVEL V: ICD-10-PCS | Mod: CS,,, | Performed by: EMERGENCY MEDICINE

## 2022-02-08 PROCEDURE — 99285 EMERGENCY DEPT VISIT HI MDM: CPT | Mod: CS,,, | Performed by: EMERGENCY MEDICINE

## 2022-02-09 ENCOUNTER — HOSPITAL ENCOUNTER (INPATIENT)
Facility: HOSPITAL | Age: 62
LOS: 3 days | Discharge: HOME | DRG: 641 | End: 2022-02-12
Attending: EMERGENCY MEDICINE | Admitting: HOSPITALIST
Payer: MEDICAID

## 2022-02-09 DIAGNOSIS — R10.13 EPIGASTRIC ABDOMINAL PAIN: ICD-10-CM

## 2022-02-09 DIAGNOSIS — R10.13 EPIGASTRIC PAIN: ICD-10-CM

## 2022-02-09 DIAGNOSIS — E83.52 HYPERCALCEMIA: Primary | ICD-10-CM

## 2022-02-09 DIAGNOSIS — R10.11 RIGHT UPPER QUADRANT ABDOMINAL PAIN: ICD-10-CM

## 2022-02-09 PROBLEM — I50.42 CHRONIC COMBINED SYSTOLIC AND DIASTOLIC HEART FAILURE: Status: ACTIVE | Noted: 2021-05-21

## 2022-02-09 LAB
ALBUMIN SERPL BCP-MCNC: 2.8 G/DL (ref 3.5–5.2)
ALBUMIN SERPL BCP-MCNC: 3 G/DL (ref 3.5–5.2)
ALBUMIN SERPL BCP-MCNC: 3.3 G/DL (ref 3.5–5.2)
ALBUMIN SERPL ELPH-MCNC: 3.09 G/DL (ref 3.35–5.55)
ALP SERPL-CCNC: 65 U/L (ref 55–135)
ALP SERPL-CCNC: 73 U/L (ref 55–135)
ALP SERPL-CCNC: 82 U/L (ref 55–135)
ALPHA1 GLOB SERPL ELPH-MCNC: 0.33 G/DL (ref 0.17–0.41)
ALPHA2 GLOB SERPL ELPH-MCNC: 0.99 G/DL (ref 0.43–0.99)
ALT SERPL W/O P-5'-P-CCNC: 65 U/L (ref 10–44)
ALT SERPL W/O P-5'-P-CCNC: 79 U/L (ref 10–44)
ALT SERPL W/O P-5'-P-CCNC: 97 U/L (ref 10–44)
ANION GAP SERPL CALC-SCNC: 11 MMOL/L (ref 8–16)
ANION GAP SERPL CALC-SCNC: 12 MMOL/L (ref 8–16)
ANION GAP SERPL CALC-SCNC: 12 MMOL/L (ref 8–16)
ANION GAP SERPL CALC-SCNC: 9 MMOL/L (ref 8–16)
AST SERPL-CCNC: 34 U/L (ref 10–40)
AST SERPL-CCNC: 43 U/L (ref 10–40)
AST SERPL-CCNC: 62 U/L (ref 10–40)
B-GLOBULIN SERPL ELPH-MCNC: 0.73 G/DL (ref 0.5–1.1)
BASOPHILS # BLD AUTO: 0.03 K/UL (ref 0–0.2)
BASOPHILS NFR BLD: 0.2 % (ref 0–1.9)
BILIRUB SERPL-MCNC: 1.1 MG/DL (ref 0.1–1)
BILIRUB SERPL-MCNC: 1.1 MG/DL (ref 0.1–1)
BILIRUB SERPL-MCNC: 1.2 MG/DL (ref 0.1–1)
BILIRUB UR QL STRIP: NEGATIVE
BNP SERPL-MCNC: 200 PG/ML (ref 0–99)
BUN SERPL-MCNC: 19 MG/DL (ref 6–20)
BUN SERPL-MCNC: 21 MG/DL (ref 6–20)
CA-I BLDV-SCNC: 1.27 MMOL/L (ref 1.06–1.42)
CA-I BLDV-SCNC: 1.46 MMOL/L (ref 1.06–1.42)
CALCIUM SERPL-MCNC: 10.4 MG/DL (ref 8.7–10.5)
CALCIUM SERPL-MCNC: 11.6 MG/DL (ref 8.7–10.5)
CALCIUM SERPL-MCNC: 13.8 MG/DL (ref 8.7–10.5)
CHLORIDE SERPL-SCNC: 79 MMOL/L (ref 95–110)
CHLORIDE SERPL-SCNC: 82 MMOL/L (ref 95–110)
CLARITY UR REFRACT.AUTO: CLEAR
CO2 SERPL-SCNC: 36 MMOL/L (ref 23–29)
CO2 SERPL-SCNC: 36 MMOL/L (ref 23–29)
CO2 SERPL-SCNC: 37 MMOL/L (ref 23–29)
CO2 SERPL-SCNC: 41 MMOL/L (ref 23–29)
COLOR UR AUTO: YELLOW
CREAT SERPL-MCNC: 0.8 MG/DL (ref 0.5–1.4)
CREAT SERPL-MCNC: 0.8 MG/DL (ref 0.5–1.4)
CREAT SERPL-MCNC: 0.9 MG/DL (ref 0.5–1.4)
CREAT SERPL-MCNC: 1 MG/DL (ref 0.5–1.4)
CTP QC/QA: YES
DIFFERENTIAL METHOD: ABNORMAL
EOSINOPHIL # BLD AUTO: 0.2 K/UL (ref 0–0.5)
EOSINOPHIL NFR BLD: 1.6 % (ref 0–8)
ERYTHROCYTE [DISTWIDTH] IN BLOOD BY AUTOMATED COUNT: 19.6 % (ref 11.5–14.5)
EST. GFR  (AFRICAN AMERICAN): >60 ML/MIN/1.73 M^2
EST. GFR  (NON AFRICAN AMERICAN): >60 ML/MIN/1.73 M^2
GAMMA GLOB SERPL ELPH-MCNC: 0.77 G/DL (ref 0.67–1.58)
GLUCOSE SERPL-MCNC: 94 MG/DL (ref 70–110)
GLUCOSE SERPL-MCNC: 95 MG/DL (ref 70–110)
GLUCOSE SERPL-MCNC: 95 MG/DL (ref 70–110)
GLUCOSE SERPL-MCNC: 98 MG/DL (ref 70–110)
GLUCOSE UR QL STRIP: NEGATIVE
HCT VFR BLD AUTO: 47.8 % (ref 40–54)
HGB BLD-MCNC: 16 G/DL (ref 14–18)
HGB UR QL STRIP: NEGATIVE
IMM GRANULOCYTES # BLD AUTO: 0.1 K/UL (ref 0–0.04)
IMM GRANULOCYTES NFR BLD AUTO: 0.8 % (ref 0–0.5)
INTERPRETATION SERPL IFE-IMP: NORMAL
KAPPA LC SER QL IA: 1.7 MG/DL (ref 0.33–1.94)
KAPPA LC/LAMBDA SER IA: 1.04 (ref 0.26–1.65)
KETONES UR QL STRIP: ABNORMAL
LAMBDA LC SER QL IA: 1.63 MG/DL (ref 0.57–2.63)
LEUKOCYTE ESTERASE UR QL STRIP: NEGATIVE
LIPASE SERPL-CCNC: 22 U/L (ref 4–60)
LYMPHOCYTES # BLD AUTO: 2 K/UL (ref 1–4.8)
LYMPHOCYTES NFR BLD: 16.9 % (ref 18–48)
MCH RBC QN AUTO: 26.5 PG (ref 27–31)
MCHC RBC AUTO-ENTMCNC: 33.5 G/DL (ref 32–36)
MCV RBC AUTO: 79 FL (ref 82–98)
MONOCYTES # BLD AUTO: 0.8 K/UL (ref 0.3–1)
MONOCYTES NFR BLD: 6.8 % (ref 4–15)
NEUTROPHILS # BLD AUTO: 8.8 K/UL (ref 1.8–7.7)
NEUTROPHILS NFR BLD: 73.7 % (ref 38–73)
NITRITE UR QL STRIP: NEGATIVE
NRBC BLD-RTO: 0 /100 WBC
PATHOLOGIST INTERPRETATION IFE: NORMAL
PATHOLOGIST INTERPRETATION SPE: NORMAL
PH UR STRIP: 8 [PH] (ref 5–8)
PLATELET # BLD AUTO: 259 K/UL (ref 150–450)
PMV BLD AUTO: 10.3 FL (ref 9.2–12.9)
POTASSIUM SERPL-SCNC: 3.2 MMOL/L (ref 3.5–5.1)
POTASSIUM SERPL-SCNC: 3.2 MMOL/L (ref 3.5–5.1)
POTASSIUM SERPL-SCNC: 3.7 MMOL/L (ref 3.5–5.1)
POTASSIUM SERPL-SCNC: 3.7 MMOL/L (ref 3.5–5.1)
PROT SERPL-MCNC: 5.8 G/DL (ref 6–8.4)
PROT SERPL-MCNC: 5.9 G/DL (ref 6–8.4)
PROT SERPL-MCNC: 6.2 G/DL (ref 6–8.4)
PROT SERPL-MCNC: 7 G/DL (ref 6–8.4)
PROT UR QL STRIP: NEGATIVE
PTH-INTACT SERPL-MCNC: 12.7 PG/ML (ref 9–77)
RBC # BLD AUTO: 6.04 M/UL (ref 4.6–6.2)
SARS-COV-2 RDRP RESP QL NAA+PROBE: NEGATIVE
SODIUM SERPL-SCNC: 128 MMOL/L (ref 136–145)
SODIUM SERPL-SCNC: 130 MMOL/L (ref 136–145)
SODIUM SERPL-SCNC: 130 MMOL/L (ref 136–145)
SODIUM SERPL-SCNC: 131 MMOL/L (ref 136–145)
SP GR UR STRIP: >=1.03 (ref 1–1.03)
TROPONIN I SERPL DL<=0.01 NG/ML-MCNC: 0.07 NG/ML (ref 0–0.03)
TROPONIN I SERPL DL<=0.01 NG/ML-MCNC: 0.1 NG/ML (ref 0–0.03)
URN SPEC COLLECT METH UR: ABNORMAL
WBC # BLD AUTO: 12.01 K/UL (ref 3.9–12.7)

## 2022-02-09 PROCEDURE — 84165 PATHOLOGIST INTERPRETATION SPE: ICD-10-PCS | Mod: 26,,, | Performed by: PATHOLOGY

## 2022-02-09 PROCEDURE — 84165 PROTEIN E-PHORESIS SERUM: CPT | Performed by: STUDENT IN AN ORGANIZED HEALTH CARE EDUCATION/TRAINING PROGRAM

## 2022-02-09 PROCEDURE — 85025 COMPLETE CBC W/AUTO DIFF WBC: CPT | Performed by: EMERGENCY MEDICINE

## 2022-02-09 PROCEDURE — 99285 EMERGENCY DEPT VISIT HI MDM: CPT | Mod: 25

## 2022-02-09 PROCEDURE — 83520 IMMUNOASSAY QUANT NOS NONAB: CPT | Mod: 59 | Performed by: STUDENT IN AN ORGANIZED HEALTH CARE EDUCATION/TRAINING PROGRAM

## 2022-02-09 PROCEDURE — 84165 PROTEIN E-PHORESIS SERUM: CPT | Mod: 26,,, | Performed by: PATHOLOGY

## 2022-02-09 PROCEDURE — U0002 COVID-19 LAB TEST NON-CDC: HCPCS | Performed by: EMERGENCY MEDICINE

## 2022-02-09 PROCEDURE — 83970 ASSAY OF PARATHORMONE: CPT | Performed by: EMERGENCY MEDICINE

## 2022-02-09 PROCEDURE — 84484 ASSAY OF TROPONIN QUANT: CPT | Performed by: STUDENT IN AN ORGANIZED HEALTH CARE EDUCATION/TRAINING PROGRAM

## 2022-02-09 PROCEDURE — 86334 IMMUNOFIX E-PHORESIS SERUM: CPT | Mod: 26,,, | Performed by: PATHOLOGY

## 2022-02-09 PROCEDURE — 63600175 PHARM REV CODE 636 W HCPCS: Performed by: EMERGENCY MEDICINE

## 2022-02-09 PROCEDURE — 97165 OT EVAL LOW COMPLEX 30 MIN: CPT

## 2022-02-09 PROCEDURE — 99223 PR INITIAL HOSPITAL CARE,LEVL III: ICD-10-PCS | Mod: ,,, | Performed by: HOSPITALIST

## 2022-02-09 PROCEDURE — 84484 ASSAY OF TROPONIN QUANT: CPT | Mod: 91 | Performed by: EMERGENCY MEDICINE

## 2022-02-09 PROCEDURE — 82397 CHEMILUMINESCENT ASSAY: CPT | Performed by: STUDENT IN AN ORGANIZED HEALTH CARE EDUCATION/TRAINING PROGRAM

## 2022-02-09 PROCEDURE — 96375 TX/PRO/DX INJ NEW DRUG ADDON: CPT

## 2022-02-09 PROCEDURE — 25000003 PHARM REV CODE 250: Performed by: EMERGENCY MEDICINE

## 2022-02-09 PROCEDURE — 86334 IMMUNOFIX E-PHORESIS SERUM: CPT | Performed by: STUDENT IN AN ORGANIZED HEALTH CARE EDUCATION/TRAINING PROGRAM

## 2022-02-09 PROCEDURE — 81003 URINALYSIS AUTO W/O SCOPE: CPT | Performed by: EMERGENCY MEDICINE

## 2022-02-09 PROCEDURE — 82330 ASSAY OF CALCIUM: CPT | Performed by: EMERGENCY MEDICINE

## 2022-02-09 PROCEDURE — 25500020 PHARM REV CODE 255: Performed by: EMERGENCY MEDICINE

## 2022-02-09 PROCEDURE — 25000003 PHARM REV CODE 250: Performed by: STUDENT IN AN ORGANIZED HEALTH CARE EDUCATION/TRAINING PROGRAM

## 2022-02-09 PROCEDURE — 27100098 HC SPACER

## 2022-02-09 PROCEDURE — 80053 COMPREHEN METABOLIC PANEL: CPT | Performed by: EMERGENCY MEDICINE

## 2022-02-09 PROCEDURE — 83880 ASSAY OF NATRIURETIC PEPTIDE: CPT | Performed by: EMERGENCY MEDICINE

## 2022-02-09 PROCEDURE — 25000003 PHARM REV CODE 250: Performed by: HOSPITALIST

## 2022-02-09 PROCEDURE — 99223 1ST HOSP IP/OBS HIGH 75: CPT | Mod: ,,, | Performed by: HOSPITALIST

## 2022-02-09 PROCEDURE — 82330 ASSAY OF CALCIUM: CPT | Mod: 91 | Performed by: STUDENT IN AN ORGANIZED HEALTH CARE EDUCATION/TRAINING PROGRAM

## 2022-02-09 PROCEDURE — S4991 NICOTINE PATCH NONLEGEND: HCPCS | Performed by: STUDENT IN AN ORGANIZED HEALTH CARE EDUCATION/TRAINING PROGRAM

## 2022-02-09 PROCEDURE — 94761 N-INVAS EAR/PLS OXIMETRY MLT: CPT

## 2022-02-09 PROCEDURE — 83690 ASSAY OF LIPASE: CPT | Performed by: EMERGENCY MEDICINE

## 2022-02-09 PROCEDURE — 20600001 HC STEP DOWN PRIVATE ROOM

## 2022-02-09 PROCEDURE — 63600175 PHARM REV CODE 636 W HCPCS: Performed by: STUDENT IN AN ORGANIZED HEALTH CARE EDUCATION/TRAINING PROGRAM

## 2022-02-09 PROCEDURE — 86334 PATHOLOGIST INTERPRETATION IFE: ICD-10-PCS | Mod: 26,,, | Performed by: PATHOLOGY

## 2022-02-09 PROCEDURE — 96376 TX/PRO/DX INJ SAME DRUG ADON: CPT

## 2022-02-09 PROCEDURE — 80053 COMPREHEN METABOLIC PANEL: CPT | Mod: 91 | Performed by: STUDENT IN AN ORGANIZED HEALTH CARE EDUCATION/TRAINING PROGRAM

## 2022-02-09 PROCEDURE — 27000221 HC OXYGEN, UP TO 24 HOURS

## 2022-02-09 PROCEDURE — 94640 AIRWAY INHALATION TREATMENT: CPT

## 2022-02-09 PROCEDURE — 25000242 PHARM REV CODE 250 ALT 637 W/ HCPCS: Performed by: STUDENT IN AN ORGANIZED HEALTH CARE EDUCATION/TRAINING PROGRAM

## 2022-02-09 PROCEDURE — 96374 THER/PROPH/DIAG INJ IV PUSH: CPT

## 2022-02-09 PROCEDURE — 96361 HYDRATE IV INFUSION ADD-ON: CPT

## 2022-02-09 PROCEDURE — 63600175 PHARM REV CODE 636 W HCPCS

## 2022-02-09 RX ORDER — INSULIN ASPART 100 [IU]/ML
0-5 INJECTION, SOLUTION INTRAVENOUS; SUBCUTANEOUS
Status: DISCONTINUED | OUTPATIENT
Start: 2022-02-09 | End: 2022-02-12 | Stop reason: HOSPADM

## 2022-02-09 RX ORDER — PANTOPRAZOLE SODIUM 40 MG/1
40 TABLET, DELAYED RELEASE ORAL DAILY
Status: DISCONTINUED | OUTPATIENT
Start: 2022-02-09 | End: 2022-02-11

## 2022-02-09 RX ORDER — SENNOSIDES 8.6 MG/1
1 TABLET ORAL DAILY PRN
Status: DISCONTINUED | OUTPATIENT
Start: 2022-02-09 | End: 2022-02-12 | Stop reason: HOSPADM

## 2022-02-09 RX ORDER — ONDANSETRON 2 MG/ML
4 INJECTION INTRAMUSCULAR; INTRAVENOUS EVERY 8 HOURS PRN
Status: DISCONTINUED | OUTPATIENT
Start: 2022-02-09 | End: 2022-02-12 | Stop reason: HOSPADM

## 2022-02-09 RX ORDER — MORPHINE SULFATE 2 MG/ML
6 INJECTION, SOLUTION INTRAMUSCULAR; INTRAVENOUS
Status: COMPLETED | OUTPATIENT
Start: 2022-02-09 | End: 2022-02-09

## 2022-02-09 RX ORDER — CALCITONIN SALMON 200 [USP'U]/ML
4 INJECTION, SOLUTION INTRAMUSCULAR; SUBCUTANEOUS
Status: COMPLETED | OUTPATIENT
Start: 2022-02-09 | End: 2022-02-09

## 2022-02-09 RX ORDER — CARVEDILOL 12.5 MG/1
12.5 TABLET ORAL 2 TIMES DAILY WITH MEALS
Status: DISCONTINUED | OUTPATIENT
Start: 2022-02-09 | End: 2022-02-12 | Stop reason: HOSPADM

## 2022-02-09 RX ORDER — AMLODIPINE BESYLATE 10 MG/1
10 TABLET ORAL DAILY
Status: DISCONTINUED | OUTPATIENT
Start: 2022-02-09 | End: 2022-02-12 | Stop reason: HOSPADM

## 2022-02-09 RX ORDER — GLUCAGON 1 MG
1 KIT INJECTION
Status: DISCONTINUED | OUTPATIENT
Start: 2022-02-09 | End: 2022-02-12 | Stop reason: HOSPADM

## 2022-02-09 RX ORDER — ENOXAPARIN SODIUM 100 MG/ML
40 INJECTION SUBCUTANEOUS EVERY 24 HOURS
Status: DISCONTINUED | OUTPATIENT
Start: 2022-02-09 | End: 2022-02-12 | Stop reason: HOSPADM

## 2022-02-09 RX ORDER — NICOTINE 7MG/24HR
1 PATCH, TRANSDERMAL 24 HOURS TRANSDERMAL DAILY
Status: DISCONTINUED | OUTPATIENT
Start: 2022-02-09 | End: 2022-02-12 | Stop reason: HOSPADM

## 2022-02-09 RX ORDER — ASPIRIN 81 MG/1
81 TABLET ORAL DAILY
Status: DISCONTINUED | OUTPATIENT
Start: 2022-02-09 | End: 2022-02-12 | Stop reason: HOSPADM

## 2022-02-09 RX ORDER — SODIUM CHLORIDE 0.9 % (FLUSH) 0.9 %
10 SYRINGE (ML) INJECTION EVERY 12 HOURS PRN
Status: DISCONTINUED | OUTPATIENT
Start: 2022-02-09 | End: 2022-02-12 | Stop reason: HOSPADM

## 2022-02-09 RX ORDER — ATORVASTATIN CALCIUM 20 MG/1
40 TABLET, FILM COATED ORAL DAILY
Status: DISCONTINUED | OUTPATIENT
Start: 2022-02-09 | End: 2022-02-12 | Stop reason: HOSPADM

## 2022-02-09 RX ORDER — FUROSEMIDE 20 MG/1
20 TABLET ORAL DAILY
Status: DISCONTINUED | OUTPATIENT
Start: 2022-02-09 | End: 2022-02-09

## 2022-02-09 RX ORDER — FOLIC ACID 1 MG/1
1 TABLET ORAL DAILY
Status: DISCONTINUED | OUTPATIENT
Start: 2022-02-09 | End: 2022-02-12 | Stop reason: HOSPADM

## 2022-02-09 RX ORDER — THIAMINE HCL 100 MG
100 TABLET ORAL DAILY
Status: DISCONTINUED | OUTPATIENT
Start: 2022-02-09 | End: 2022-02-12 | Stop reason: HOSPADM

## 2022-02-09 RX ORDER — ACETAMINOPHEN 325 MG/1
650 TABLET ORAL EVERY 4 HOURS PRN
Status: DISCONTINUED | OUTPATIENT
Start: 2022-02-09 | End: 2022-02-12 | Stop reason: HOSPADM

## 2022-02-09 RX ORDER — IBUPROFEN 200 MG
24 TABLET ORAL
Status: DISCONTINUED | OUTPATIENT
Start: 2022-02-09 | End: 2022-02-12 | Stop reason: HOSPADM

## 2022-02-09 RX ORDER — ALBUTEROL SULFATE 90 UG/1
1 AEROSOL, METERED RESPIRATORY (INHALATION) EVERY 6 HOURS PRN
Status: DISCONTINUED | OUTPATIENT
Start: 2022-02-09 | End: 2022-02-12 | Stop reason: HOSPADM

## 2022-02-09 RX ORDER — IBUPROFEN 200 MG
16 TABLET ORAL
Status: DISCONTINUED | OUTPATIENT
Start: 2022-02-09 | End: 2022-02-12 | Stop reason: HOSPADM

## 2022-02-09 RX ORDER — ONDANSETRON 2 MG/ML
4 INJECTION INTRAMUSCULAR; INTRAVENOUS
Status: COMPLETED | OUTPATIENT
Start: 2022-02-09 | End: 2022-02-09

## 2022-02-09 RX ORDER — HYDROMORPHONE HYDROCHLORIDE 1 MG/ML
0.5 INJECTION, SOLUTION INTRAMUSCULAR; INTRAVENOUS; SUBCUTANEOUS
Status: COMPLETED | OUTPATIENT
Start: 2022-02-09 | End: 2022-02-09

## 2022-02-09 RX ORDER — NALOXONE HCL 0.4 MG/ML
0.02 VIAL (ML) INJECTION
Status: DISCONTINUED | OUTPATIENT
Start: 2022-02-09 | End: 2022-02-12 | Stop reason: HOSPADM

## 2022-02-09 RX ORDER — MORPHINE SULFATE 2 MG/ML
2 INJECTION, SOLUTION INTRAMUSCULAR; INTRAVENOUS ONCE
Status: COMPLETED | OUTPATIENT
Start: 2022-02-09 | End: 2022-02-09

## 2022-02-09 RX ORDER — SODIUM CHLORIDE 9 MG/ML
INJECTION, SOLUTION INTRAVENOUS CONTINUOUS
Status: ACTIVE | OUTPATIENT
Start: 2022-02-09 | End: 2022-02-09

## 2022-02-09 RX ADMIN — ASPIRIN 81 MG: 81 TABLET, COATED ORAL at 01:02

## 2022-02-09 RX ADMIN — PANTOPRAZOLE SODIUM 40 MG: 40 TABLET, DELAYED RELEASE ORAL at 01:02

## 2022-02-09 RX ADMIN — ATORVASTATIN CALCIUM 40 MG: 20 TABLET, FILM COATED ORAL at 01:02

## 2022-02-09 RX ADMIN — CARVEDILOL 12.5 MG: 12.5 TABLET, FILM COATED ORAL at 08:02

## 2022-02-09 RX ADMIN — ONDANSETRON 4 MG: 2 INJECTION INTRAMUSCULAR; INTRAVENOUS at 01:02

## 2022-02-09 RX ADMIN — Medication 100 MG: at 01:02

## 2022-02-09 RX ADMIN — IOHEXOL 100 ML: 350 INJECTION, SOLUTION INTRAVENOUS at 01:02

## 2022-02-09 RX ADMIN — HYDROMORPHONE HYDROCHLORIDE 0.5 MG: 1 INJECTION, SOLUTION INTRAMUSCULAR; INTRAVENOUS; SUBCUTANEOUS at 04:02

## 2022-02-09 RX ADMIN — NICOTINE 1 PATCH: 7 PATCH, EXTENDED RELEASE TRANSDERMAL at 08:02

## 2022-02-09 RX ADMIN — SENNOSIDES 1 TABLET: 8.6 TABLET ORAL at 01:02

## 2022-02-09 RX ADMIN — SODIUM CHLORIDE 500 ML: 0.9 INJECTION, SOLUTION INTRAVENOUS at 12:02

## 2022-02-09 RX ADMIN — SODIUM CHLORIDE: 0.9 INJECTION, SOLUTION INTRAVENOUS at 11:02

## 2022-02-09 RX ADMIN — AMLODIPINE BESYLATE 10 MG: 10 TABLET ORAL at 01:02

## 2022-02-09 RX ADMIN — MORPHINE SULFATE 6 MG: 2 INJECTION, SOLUTION INTRAMUSCULAR; INTRAVENOUS at 03:02

## 2022-02-09 RX ADMIN — FOLIC ACID 1 MG: 1 TABLET ORAL at 01:02

## 2022-02-09 RX ADMIN — SODIUM CHLORIDE 500 ML: 0.9 INJECTION, SOLUTION INTRAVENOUS at 04:02

## 2022-02-09 RX ADMIN — ENOXAPARIN SODIUM 40 MG: 100 INJECTION SUBCUTANEOUS at 04:02

## 2022-02-09 RX ADMIN — ALBUTEROL SULFATE 1 PUFF: 108 INHALANT RESPIRATORY (INHALATION) at 07:02

## 2022-02-09 RX ADMIN — MORPHINE SULFATE 6 MG: 2 INJECTION, SOLUTION INTRAMUSCULAR; INTRAVENOUS at 01:02

## 2022-02-09 RX ADMIN — CALCITONIN SALMON 254 UNITS: 200 INJECTION, SOLUTION INTRAMUSCULAR; SUBCUTANEOUS at 04:02

## 2022-02-09 RX ADMIN — CARVEDILOL 12.5 MG: 12.5 TABLET, FILM COATED ORAL at 04:02

## 2022-02-09 RX ADMIN — MORPHINE SULFATE 2 MG: 2 INJECTION, SOLUTION INTRAMUSCULAR; INTRAVENOUS at 01:02

## 2022-02-09 NOTE — PROVIDER PROGRESS NOTES - EMERGENCY DEPT.
Encounter Date: 2/8/2022    ED Physician Progress Notes         EKG - STEMI Decision  Initial Reading: No STEMI present.

## 2022-02-09 NOTE — ASSESSMENT & PLAN NOTE
The 10-year ASCVD risk score (Lora HERMOSILLO Jr., et al., 2013) is: 22.6%    Values used to calculate the score:      Age: 61 years      Sex: Male      Is Non- : Yes      Diabetic: No      Tobacco smoker: Yes      Systolic Blood Pressure: 140 mmHg      Is BP treated: Yes      HDL Cholesterol: 91 mg/dL      Total Cholesterol: 260 mg/dL    - continue home statin  - trop 0.021 on admission, w/o EKG changes, f/u repeat trop

## 2022-02-09 NOTE — ASSESSMENT & PLAN NOTE
No SOB on home 2L.   Chronic elevated bicarb (41 on admission), suspected to be 2/2 respiratory hyperventilation from COPD    - continue prn albuterol   - continue Spriva  - consider BiPAP prn

## 2022-02-09 NOTE — PHARMACY MED REC
"Admission Medication History     The home medication history was taken by Onelia Ames.    You may go to "Admission" then "Reconcile Home Medications" tabs to review and/or act upon these items.      The home medication list has been updated by the Pharmacy department.    Please read ALL comments highlighted in yellow.    Please address this information as you see fit.     Feel free to contact us if you have any questions or require assistance.      The medications listed below were removed from the home medication list. Please reorder if appropriate:  Patient reports no longer taking the following medication(s):   ASPIRIN 81 MG   FOLIC ACID 1 MG   MULTIVITAMIN TAB   SENNA 8.6 MG   THIAMINE 100 MG   NICOTINE 7 MG PATCH    Medications listed below were obtained from: Patient/family     Current Outpatient Medications on File Prior to Encounter   Medication Sig    acetaminophen (TYLENOL) 325 MG tablet   Take 2 tablets (650 mg total) by mouth every 8 (eight) hours as needed for Pain or Temperature greater than (100.5).    albuterol (PROVENTIL/VENTOLIN HFA) 90 mcg/actuation inhaler   Inhale 1-2 puffs into the lungs every 6 (six) hours as needed for Wheezing. Rescue    albuterol-ipratropium (DUO-NEB) 2.5 mg-0.5 mg/3 mL nebulizer solution   Take 3 mLs by nebulization every 4 (four) hours as needed for Wheezing or Shortness of Breath. Rescue    carvediloL (COREG) 12.5 MG tablet   Take 1 tablet (12.5 mg total) by mouth 2 (two) times daily with meals.    furosemide (LASIX) 20 MG tablet   Take 1 tablet (20 mg total) by mouth once daily.    predniSONE (DELTASONE) 10 MG tablet   By mouth take 4 tablets (40 mg) for 5 days then 2 tablet (20 mg) for 5 days then take 1 tablet for 5 days, then take half tablet a day    SPIRIVA WITH HANDIHALER 18 mcg inhalation capsule   Inhale 1 capsule (18 mcg total) into the lungs once daily.    budesonide-glycopyr-formoterol (BREZTRI AEROSPHERE) 160-9-4.8 mcg/actuation HFAA   Inhale " 2 puffs into the lungs 2 (two) times daily. Rinse mouth after use.     Potential issues to be addressed PRIOR TO DISCHARGE  Patient reported not taking the following medications: (AMLODIPINE, ATORVASTATIN). These medications remain on the home medication list. Please address accordingly.     Onelia Ames CPhT  EXT [phone]                    .

## 2022-02-09 NOTE — H&P
[facility] - Emergency Dept  Hospital Medicine  History & Physical    Patient Name: Baltazar Mccray  MRN: [id number]  Patient Class: IP- Inpatient  Admission Date: 2/9/2022  Attending Physician: Rocío Tolentino*   Primary Care Provider: [facility]         Patient information was obtained from patient and ER records.     Subjective:     Principal Problem:Hypercalcemia    Chief Complaint:   Chief Complaint   Patient presents with    Abdominal Pain     Pt arrives c/o epigastric pain. Seen yesterday for same incident. Denies SOB        HPI: Mr. Mccray is a 61-year-old-man with HTN, HLD, COPD (on home 2L), tobacco abuse, seizure disorders (no longer on anti-epileptics), HFpEF (55%), who presents with post-prandial epigastric abdominal pain. Seen day prior in ED for similar complaint and requested discharge after he tolerated PO s/p phenergan.     Started 2 days ago. Intermittent and worse after eating. Last about 10 minutes, 8/10 in severity. Does not radiate. Associated with loss of appetite, nausea and increased thirst. No emesis/hematemasis. Having daily brown bowel movements. No SOB or CP. No fever or chills. No bone pain. No confusion or hallucinations.No dysuria. No weight gain or loss. No night sweats. Has not happened before. No family hx of hyperparathyroidism or MEN. Lives alone with cat. Working on quitting smoking, down to 1pp/week. Drinks 1-3 beers about 1x/week.     In ED, afebrile, HR 93, 164/98, stating well on home 2L w/o tachypnea. WBC 12. Hg 16. (bl 12.9), MCV 79. cCa 11.5 (high). Ionized Ca 1.46 (high). Cr 0.9 (bl). Bicarb 41 (at baseline). Trop 0.071. EKG NSR 90, QTc wnl, no change from prior. CT A/P negative for acute abdominopelvic abnormalities to explain epigastric pain. RUQ US with 2 small gal bladder polyps and no cholecystitis. S/p 500 NS x2 in ED and calcitonin trial. PTH, PTHrp, and MM w/u pending.       Past Medical History:   Diagnosis Date    Asthma     COPD  (chronic obstructive pulmonary disease)     home O2 at night only    Coronary artery disease     Hypertension     Pneumonia     Seizures        Past Surgical History:   Procedure Laterality Date    LEFT HEART CATHETERIZATION  4/23/2020    Procedure: Left heart cath;  Surgeon: Nic Pollack MD;  Location: [facility] CATH LAB;  Service: Cardiology;;       Review of patient's allergies indicates:   Allergen Reactions    Benazepril Swelling    Duoneb [ipratropium-albuterol]      Report minor Tremors, pt seems to be tolerating well.       No current facility-administered medications on file prior to encounter.     Current Outpatient Medications on File Prior to Encounter   Medication Sig    acetaminophen (TYLENOL) 325 MG tablet Take 2 tablets (650 mg total) by mouth every 8 (eight) hours as needed for Pain or Temperature greater than (100.5).    albuterol (PROVENTIL/VENTOLIN HFA) 90 mcg/actuation inhaler Inhale 1-2 puffs into the lungs every 6 (six) hours as needed for Wheezing. Rescue    albuterol-ipratropium (DUO-NEB) 2.5 mg-0.5 mg/3 mL nebulizer solution Take 3 mLs by nebulization every 4 (four) hours as needed for Wheezing or Shortness of Breath. Rescue    amLODIPine (NORVASC) 10 MG tablet Take 1 tablet (10 mg total) by mouth once daily.    aspirin (ECOTRIN) 81 MG EC tablet Take 1 tablet (81 mg total) by mouth once daily.    atorvastatin (LIPITOR) 40 MG tablet Take 1 tablet (40 mg total) by mouth once daily.    budesonide-glycopyr-formoterol (BREZTRI AEROSPHERE) 160-9-4.8 mcg/actuation HFAA Inhale 2 puffs into the lungs 2 (two) times daily. Rinse mouth after use.    carvediloL (COREG) 12.5 MG tablet Take 1 tablet (12.5 mg total) by mouth 2 (two) times daily with meals.    folic acid (FOLVITE) 1 MG tablet Take 1 tablet (1 mg total) by mouth once daily.    furosemide (LASIX) 20 MG tablet Take 1 tablet (20 mg total) by mouth once daily.    multivitamin Tab Take 1 tablet by mouth once daily.    nicotine  (NICODERM CQ) 7 mg/24 hr Place 1 patch onto the skin once daily. (Patient not taking: Reported on 1/31/2022)    predniSONE (DELTASONE) 10 MG tablet By mouth take 4 tablets (40 mg) for 5 days then 2 tablet (20 mg) for 5 days then take 1 tablet for 5 days, then take half tablet a day    senna (SENOKOT) 8.6 mg tablet Take 1 tablet by mouth daily as needed for Constipation.    SPIRIVA WITH HANDIHALER 18 mcg inhalation capsule Inhale 1 capsule (18 mcg total) into the lungs once daily.    thiamine 100 MG tablet Take 1 tablet (100 mg total) by mouth once daily.     Family History     Problem Relation (Age of Onset)    Cancer Father    Hypertension Sister    Stroke Sister, Brother        Tobacco Use    Smoking status: Current Some Day Smoker     Packs/day: 0.25     Types: Cigarettes    Smokeless tobacco: Never Used    Tobacco comment: 3-4 per day   Substance and Sexual Activity    Alcohol use: Yes     Alcohol/week: 2.0 standard drinks     Types: 2 Cans of beer per week     Comment: every night    Drug use: No    Sexual activity: Not Currently     Review of Systems   Constitutional: Positive for appetite change. Negative for chills and fever.   HENT: Negative for congestion and rhinorrhea.    Eyes: Negative for discharge, redness and visual disturbance.   Respiratory: Negative for cough, shortness of breath and wheezing.    Cardiovascular: Negative for chest pain, palpitations and leg swelling.   Gastrointestinal: Positive for abdominal pain and nausea. Negative for abdominal distention, constipation, diarrhea and vomiting.   Endocrine:        Thirst   Genitourinary: Negative for dysuria, frequency, hematuria and urgency.   Musculoskeletal: Negative for arthralgias, back pain and myalgias.   Skin: Negative for rash.   Neurological: Negative for tremors, light-headedness, numbness and headaches.   Psychiatric/Behavioral: Negative for confusion and hallucinations.     Objective:     Vital Signs (Most Recent):  Temp:  98 °F (36.7 °C) (02/09/22 0303)  Pulse: 66 (02/09/22 0303)  Resp: 20 (02/09/22 0405)  BP: (!) 178/91 (02/09/22 0303)  SpO2: 97 % (02/08/22 2257) Vital Signs (24h Range):  Temp:  [98 °F (36.7 °C)-98.1 °F (36.7 °C)] 98 °F (36.7 °C)  Pulse:  [66-93] 66  Resp:  [16-20] 20  SpO2:  [97 %] 97 %  BP: (164-178)/(91-98) 178/91     Weight: 63.5 kg (140 lb)  Body mass index is 21.29 kg/m².    Physical Exam  Constitutional:       General: He is not in acute distress.     Appearance: Normal appearance. He is not toxic-appearing.   HENT:      Head: Normocephalic and atraumatic.      Nose: No congestion or rhinorrhea.      Mouth/Throat:      Mouth: Mucous membranes are dry.      Pharynx: Oropharynx is clear.   Eyes:      General: No scleral icterus.     Conjunctiva/sclera: Conjunctivae normal.      Pupils: Pupils are equal, round, and reactive to light.   Neck:      Comments: No JVD  No palpable neck masses, no thyroidmegaly   Cardiovascular:      Rate and Rhythm: Normal rate and regular rhythm.      Pulses: Normal pulses.      Heart sounds: No murmur heard.      Pulmonary:      Effort: Pulmonary effort is normal. No respiratory distress.      Breath sounds: Normal breath sounds. No wheezing or rales.      Comments: On 2L NC  Abdominal:      General: Abdomen is flat. Bowel sounds are normal. There is no distension.      Palpations: Abdomen is soft.      Tenderness: There is abdominal tenderness (epigastric). There is no right CVA tenderness, left CVA tenderness or guarding.   Musculoskeletal:         General: Normal range of motion.      Cervical back: Normal range of motion and neck supple.      Right lower leg: No edema.      Left lower leg: No edema.   Skin:     General: Skin is warm and dry.   Neurological:      General: No focal deficit present.      Mental Status: He is alert and oriented to person, place, and time. Mental status is at baseline.      Sensory: No sensory deficit.      Motor: No weakness.   Psychiatric:          Behavior: Behavior normal.         Thought Content: Thought content normal.           CRANIAL NERVES     CN III, IV, VI   Pupils are equal, round, and reactive to light.       Significant Labs: All pertinent labs within the past 24 hours have been reviewed.    Significant Imaging: I have reviewed all pertinent imaging results/findings within the past 24 hours.    Assessment/Plan:     * Hypercalcemia  Mr. Mccray is a 61-year-old-man with HTN, HLD, COPD (on home 2L), tobacco abuse, seizure disorders (no longer on anti-epileptics), HFpEF (55%), who presents with post-prandial epigastric abdominal pain. Seen day prior in ED for similar complaint and requested discharge after he tolerated PO s/p phenergan.     In ED, afebrile, HR 93, 164/98, stating well on home 2L w/o tachypnea. WBC 12. Hg 16. (bl 12.9), MCV 79. cCa 11.5 (high). Ionized Ca 1.46 (high). Cr 0.9 (bl). Bicarb 41 (at baseline). Trop 0.071. EKG NSR 90, QTc wnl, no change from prior. CT A/P negative for acute abdominopelvic abnormalities to explain epigastric pain. RUQ US with 2 small gal bladder polyps and no cholecystitis. S/p 500 NS x2 in ED and calcitonin trial. PTH, PTHrp, and MM w/u pending.     Symptomatic moderate hypercalcemia   Suspect dehydration given concentrated Hg and U sp gravity 1.030  R/o hyperparathyroidism, r/o elevated PTHrp, r/o MM    - s/p  NS in ED  - continue IV fluid bolus as need, trend Ca, CMP BID  - cautious with continuous fluids given diastolic dysfunction  - calcitonin trial in ED, if cCa improving can schedule calcitonin q8 hrs until Ca normalizes   - if Ca continues elevated, consider bisphosphonate, zoledronic acid   - if PTH mediated, f/u with endocrine outpatient for further hyperparathyroidism workup   - f/u PTH, PTHrp, SPEP, UPEP, IF, LC    Epigastric pain  New onset epigastric pain. CT A/P unrevealing. Suspect 2/2 hypercalcemia.   S/p morphine 6 mg x2 and dilaudid in ED.   Pain resolving.     - PPI trial  - see  hypercalcemia management   - prn tyelonol   - consider PO oxy if needed     Chronic diastolic heart failure  TTE 1/29/2022:   · The left ventricle is normal in size with normal systolic function.  · Mild left atrial enlargement.  · There is pulmonary hypertension.  · The estimated ejection fraction is 55%.  · Grade I left ventricular diastolic dysfunction.  · Study quality was technically difficult  · Normal right ventricular size with normal right ventricular systolic function.  · Normal central venous pressure (3 mmHg).  · The estimated PA systolic pressure is 50 mmHg.  · Interatrial septal aneurysm  · Mild tricuspid regurgitation.    Not overloaded on exam. .     - monitor volume status   - continue home furosemide 20 mg qd    Chronic obstructive pulmonary disease  No SOB on home 2L.   Chronic elevated bicarb (41 on admission), suspected to be 2/2 respiratory hyperventilation from COPD    - continue prn albuterol   - continue Spriva  - consider BiPAP prn    Nonobstructive atherosclerosis of coronary artery  The 10-year ASCVD risk score (Lora HERMOSILLO Jr., et al., 2013) is: 22.6%    Values used to calculate the score:      Age: 61 years      Sex: Male      Is Non- : Yes      Diabetic: No      Tobacco smoker: Yes      Systolic Blood Pressure: 140 mmHg      Is BP treated: Yes      HDL Cholesterol: 91 mg/dL      Total Cholesterol: 260 mg/dL    - continue home statin  - trop 0.021 on admission, w/o EKG changes, f/u repeat trop     Tobacco abuse  Attempting to quit.   - nicotine patch   - cessation encouraged    Hx of seizure disorder  Stable. No recent seizures. Not on home meds.   Alcohol use 1-3 drinks 1x/week.   - watch for signs of alcohol WD    Essential hypertension  /98 on admission. Not on HCTZ.     - continue home coreg 12.5 mg BID  - amlodipine 10 mg qd    VTE Risk Mitigation (From admission, onward)         Ordered     enoxaparin injection 40 mg  Daily         02/09/22 0195      IP VTE HIGH RISK PATIENT  Once         02/09/22 0421     Place sequential compression device  Until discontinued         02/09/22 0421                   Amber Blas MD  Department of Hospital Medicine   [facility] - Emergency Dept

## 2022-02-09 NOTE — SUBJECTIVE & OBJECTIVE
Past Medical History:   Diagnosis Date    Asthma     COPD (chronic obstructive pulmonary disease)     home O2 at night only    Coronary artery disease     Hypertension     Pneumonia     Seizures        Past Surgical History:   Procedure Laterality Date    LEFT HEART CATHETERIZATION  4/23/2020    Procedure: Left heart cath;  Surgeon: Nic Pollack MD;  Location: [facility] CATH LAB;  Service: Cardiology;;       Review of patient's allergies indicates:   Allergen Reactions    Benazepril Swelling    Duoneb [ipratropium-albuterol]      Report minor Tremors, pt seems to be tolerating well.       No current facility-administered medications on file prior to encounter.     Current Outpatient Medications on File Prior to Encounter   Medication Sig    acetaminophen (TYLENOL) 325 MG tablet Take 2 tablets (650 mg total) by mouth every 8 (eight) hours as needed for Pain or Temperature greater than (100.5).    albuterol (PROVENTIL/VENTOLIN HFA) 90 mcg/actuation inhaler Inhale 1-2 puffs into the lungs every 6 (six) hours as needed for Wheezing. Rescue    albuterol-ipratropium (DUO-NEB) 2.5 mg-0.5 mg/3 mL nebulizer solution Take 3 mLs by nebulization every 4 (four) hours as needed for Wheezing or Shortness of Breath. Rescue    amLODIPine (NORVASC) 10 MG tablet Take 1 tablet (10 mg total) by mouth once daily.    aspirin (ECOTRIN) 81 MG EC tablet Take 1 tablet (81 mg total) by mouth once daily.    atorvastatin (LIPITOR) 40 MG tablet Take 1 tablet (40 mg total) by mouth once daily.    budesonide-glycopyr-formoterol (BREZTRI AEROSPHERE) 160-9-4.8 mcg/actuation HFAA Inhale 2 puffs into the lungs 2 (two) times daily. Rinse mouth after use.    carvediloL (COREG) 12.5 MG tablet Take 1 tablet (12.5 mg total) by mouth 2 (two) times daily with meals.    folic acid (FOLVITE) 1 MG tablet Take 1 tablet (1 mg total) by mouth once daily.    furosemide (LASIX) 20 MG tablet Take 1 tablet (20 mg total) by mouth once daily.     multivitamin Tab Take 1 tablet by mouth once daily.    nicotine (NICODERM CQ) 7 mg/24 hr Place 1 patch onto the skin once daily. (Patient not taking: Reported on 1/31/2022)    predniSONE (DELTASONE) 10 MG tablet By mouth take 4 tablets (40 mg) for 5 days then 2 tablet (20 mg) for 5 days then take 1 tablet for 5 days, then take half tablet a day    senna (SENOKOT) 8.6 mg tablet Take 1 tablet by mouth daily as needed for Constipation.    SPIRIVA WITH HANDIHALER 18 mcg inhalation capsule Inhale 1 capsule (18 mcg total) into the lungs once daily.    thiamine 100 MG tablet Take 1 tablet (100 mg total) by mouth once daily.     Family History     Problem Relation (Age of Onset)    Cancer Father    Hypertension Sister    Stroke Sister, Brother        Tobacco Use    Smoking status: Current Some Day Smoker     Packs/day: 0.25     Types: Cigarettes    Smokeless tobacco: Never Used    Tobacco comment: 3-4 per day   Substance and Sexual Activity    Alcohol use: Yes     Alcohol/week: 2.0 standard drinks     Types: 2 Cans of beer per week     Comment: every night    Drug use: No    Sexual activity: Not Currently     Review of Systems   Constitutional: Positive for appetite change. Negative for chills and fever.   HENT: Negative for congestion and rhinorrhea.    Eyes: Negative for discharge, redness and visual disturbance.   Respiratory: Negative for cough, shortness of breath and wheezing.    Cardiovascular: Negative for chest pain, palpitations and leg swelling.   Gastrointestinal: Positive for abdominal pain and nausea. Negative for abdominal distention, constipation, diarrhea and vomiting.   Endocrine:        Thirst   Genitourinary: Negative for dysuria, frequency, hematuria and urgency.   Musculoskeletal: Negative for arthralgias, back pain and myalgias.   Skin: Negative for rash.   Neurological: Negative for tremors, light-headedness, numbness and headaches.   Psychiatric/Behavioral: Negative for confusion and  hallucinations.     Objective:     Vital Signs (Most Recent):  Temp: 98 °F (36.7 °C) (02/09/22 0303)  Pulse: 66 (02/09/22 0303)  Resp: 20 (02/09/22 0405)  BP: (!) 178/91 (02/09/22 0303)  SpO2: 97 % (02/08/22 2257) Vital Signs (24h Range):  Temp:  [98 °F (36.7 °C)-98.1 °F (36.7 °C)] 98 °F (36.7 °C)  Pulse:  [66-93] 66  Resp:  [16-20] 20  SpO2:  [97 %] 97 %  BP: (164-178)/(91-98) 178/91     Weight: 63.5 kg (140 lb)  Body mass index is 21.29 kg/m².    Physical Exam  Constitutional:       General: He is not in acute distress.     Appearance: Normal appearance. He is not toxic-appearing.   HENT:      Head: Normocephalic and atraumatic.      Nose: No congestion or rhinorrhea.      Mouth/Throat:      Mouth: Mucous membranes are dry.      Pharynx: Oropharynx is clear.   Eyes:      General: No scleral icterus.     Conjunctiva/sclera: Conjunctivae normal.      Pupils: Pupils are equal, round, and reactive to light.   Neck:      Comments: No JVD  No palpable neck masses, no thyroidmegaly   Cardiovascular:      Rate and Rhythm: Normal rate and regular rhythm.      Pulses: Normal pulses.      Heart sounds: No murmur heard.      Pulmonary:      Effort: Pulmonary effort is normal. No respiratory distress.      Breath sounds: Normal breath sounds. No wheezing or rales.      Comments: On 2L NC  Abdominal:      General: Abdomen is flat. Bowel sounds are normal. There is no distension.      Palpations: Abdomen is soft.      Tenderness: There is abdominal tenderness (epigastric). There is no right CVA tenderness, left CVA tenderness or guarding.   Musculoskeletal:         General: Normal range of motion.      Cervical back: Normal range of motion and neck supple.      Right lower leg: No edema.      Left lower leg: No edema.   Skin:     General: Skin is warm and dry.   Neurological:      General: No focal deficit present.      Mental Status: He is alert and oriented to person, place, and time. Mental status is at baseline.       Sensory: No sensory deficit.      Motor: No weakness.   Psychiatric:         Behavior: Behavior normal.         Thought Content: Thought content normal.           CRANIAL NERVES     CN III, IV, VI   Pupils are equal, round, and reactive to light.       Significant Labs: All pertinent labs within the past 24 hours have been reviewed.    Significant Imaging: I have reviewed all pertinent imaging results/findings within the past 24 hours.

## 2022-02-09 NOTE — ASSESSMENT & PLAN NOTE
TTE 1/29/2022:   · The left ventricle is normal in size with normal systolic function.  · Mild left atrial enlargement.  · There is pulmonary hypertension.  · The estimated ejection fraction is 55%.  · Grade I left ventricular diastolic dysfunction.  · Study quality was technically difficult  · Normal right ventricular size with normal right ventricular systolic function.  · Normal central venous pressure (3 mmHg).  · The estimated PA systolic pressure is 50 mmHg.  · Interatrial septal aneurysm  · Mild tricuspid regurgitation.    Not overloaded on exam. .     - monitor volume status   - continue home furosemide 20 mg qd

## 2022-02-09 NOTE — HPI
Mr. Mccray is a 61-year-old-man with HTN, HLD, COPD (on home 2L), tobacco abuse, seizure disorders (no longer on anti-epileptics), HFpEF (55%), who presents with post-prandial epigastric abdominal pain. Seen day prior in ED for similar complaint and requested discharge after he tolerated PO s/p phenergan.     Started 2 days ago. Intermittent and worse after eating. Last about 10 minutes, 8/10 in severity. Does not radiate. Associated with loss of appetite, nausea and increased thirst. No emesis/hematemasis. Having daily brown bowel movements. No SOB or CP. No fever or chills. No bone pain. No confusion or hallucinations.No dysuria. No weight gain or loss. No night sweats. Has not happened before. No family hx of hyperparathyroidism or MEN. Lives alone with cat. Working on quitting smoking, down to 1pp/week. Drinks 1-3 beers about 1x/week.     In ED, afebrile, HR 93, 164/98, stating well on home 2L w/o tachypnea. WBC 12. Hg 16. (bl 12.9), MCV 79. cCa 11.5 (high). Ionized Ca 1.46 (high). Cr 0.9 (bl). Bicarb 41 (at baseline). Trop 0.071. EKG NSR 90, QTc wnl, no change from prior. CT A/P negative for acute abdominopelvic abnormalities to explain epigastric pain. RUQ US with 2 small gal bladder polyps and no cholecystitis. S/p 500 NS x2 in ED and calcitonin trial. PTH, PTHrp, and MM w/u pending.

## 2022-02-09 NOTE — ASSESSMENT & PLAN NOTE
New onset epigastric pain. CT A/P unrevealing. Suspect 2/2 hypercalcemia.   S/p morphine 6 mg x2 and dilaudid in ED.   Pain resolving.     - PPI trial  - see hypercalcemia management   - prn tyelonol   - consider PO oxy if needed

## 2022-02-09 NOTE — PLAN OF CARE
Problem: Occupational Therapy Goal  Goal: Occupational Therapy Goal    Description: Pt does not require skilled OT services at this time. Pt is performing all ADLs and functional mobility at [facility]. Please re-consult if any changes.     Outcome: Met

## 2022-02-09 NOTE — ASSESSMENT & PLAN NOTE
Mr. Mccray is a 61-year-old-man with HTN, HLD, COPD (on home 2L), tobacco abuse, seizure disorders (no longer on anti-epileptics), HFpEF (55%), who presents with post-prandial epigastric abdominal pain. Seen day prior in ED for similar complaint and requested discharge after he tolerated PO s/p phenergan.     In ED, afebrile, HR 93, 164/98, stating well on home 2L w/o tachypnea. WBC 12. Hg 16. (bl 12.9), MCV 79. cCa 11.5 (high). Ionized Ca 1.46 (high). Cr 0.9 (bl). Bicarb 41 (at baseline). Trop 0.071. EKG NSR 90, QTc wnl, no change from prior. CT A/P negative for acute abdominopelvic abnormalities to explain epigastric pain. RUQ US with 2 small gal bladder polyps and no cholecystitis. S/p 500 NS x2 in ED and calcitonin trial. PTH, PTHrp, and MM w/u pending.     Symptomatic moderate hypercalcemia   Suspect dehydration given concentrated Hg and U sp gravity 1.030  R/o hyperparathyroidism, r/o elevated PTHrp, r/o MM    - s/p  NS in ED  - continue IV fluid bolus as need, trend Ca, CMP BID  - cautious with continuous fluids given diastolic dysfunction  - calcitonin trial in ED, if cCa improving can schedule calcitonin q8 hrs until Ca normalizes   - if Ca continues elevated, consider bisphosphonate, zoledronic acid   - if PTH mediated, f/u with endocrine outpatient for further hyperparathyroidism workup   - f/u PTH, PTHrp, SPEP, UPEP, IF, LC

## 2022-02-09 NOTE — ASSESSMENT & PLAN NOTE
Stable. No recent seizures. Not on home meds.   Alcohol use 1-3 drinks 1x/week.   - watch for signs of alcohol WD

## 2022-02-09 NOTE — PT/OT/SLP EVAL
"Occupational Therapy   Evaluation and Discharge Note    Name: Baltazar Mccray  MRN: [id number]  Admitting Diagnosis:  Hypercalcemia   Recent Surgery: * No surgery found *      Recommendations:     Discharge Recommendations: home  Discharge Equipment Recommendations:  none  Barriers to discharge:  None    Assessment:     Baltazar Mccray is a 61 y.o. male with a medical diagnosis of Hypercalcemia. At this time, patient is functioning at their prior level of function and does not require further acute OT services.     Plan:     During this hospitalization, patient does not require further acute OT services.  Please re-consult if situation changes.    · Plan of Care Reviewed with: patient    Subjective     "I can do all of that myself"     Chief Complaint: SOB  Patient/Family Comments/goals: To return home     Occupational Profile:  Living Environment: Pt lives alone in a first floor apartment with 0STE. He has a tub/shower combo with a SC and grab bars.   Previous level of function: Independent   Equipment Used at home:  oxygen,shower chair,grab bar  Assistance upon Discharge: Pt's brother stops by every day     Pain/Comfort:  · Pain Rating 1: 0/10    Patients cultural, spiritual, Faith conflicts given the current situation: no    Objective:     Communicated with: RN prior to session.  Patient found HOB elevated with oxygen upon OT entry to room.    General Precautions: Standard, fall   Orthopedic Precautions:N/A   Braces: N/A  Respiratory Status: Nasal cannula, flow 2 L/min     Occupational Performance:    Bed Mobility:    · Patient completed Rolling/Turning to Right with independence  · Patient completed Scooting/Bridging with independence  · Patient completed Supine to Sit with independence    Functional Mobility/Transfers:  · Patient completed Sit <> Stand Transfer with supervision  with  no assistive device   · Patient completed Toilet Transfer Step Transfer technique with supervision with  no AD  · Functional " Mobility: Pt engaging in functional mobility to simulate household/community distances approx 40 ft with supervision and utilizing no AD in order to maximize functional activity tolerance and standing balance required for engagement in occupations of choice.    Activities of Daily Living:  · Lower Body Dressing: stand by assistance : To don B socks sitting EOB     Cognitive/Visual Perceptual:  Cognitive/Psychosocial Skills:     -       Oriented to: Person, Place, Time and Situation   -       Follows Commands/attention:Follows multistep  commands  -       Safety awareness/insight to disability: impaired     Physical Exam:    Balance:  Static Sitting   independence   Dynamic Sitting   independence   Static Standing   supervision   Dynamic Standing   supervision     Upper Extremity Function:   Edema None noted   Sensation    -       Intact   Hand Dominance Right   LUE ROM  WNL   RUE ROM WNL   LUE Strength  WNL   RUE Strength  WNL   LUE  Strength WNL   RUE  Strength  WNL   Fine Motor Skills     -       Intact   Gross Motor skills    WFL         AMPAC 6 Click ADL:  AMPAC Total Score: 24    Treatment & Education:   Therapist provided facilitation and instruction of proper body mechanics and fall prevention strategies during tasks listed above.   Instructed patient to sit in bedside chair daily to increase OOB/activity tolerance.   Instructed patient to use call light to have nursing staff assist with needs/transfers.   Discussed OT POC and answered all questions within OT scope of practice.   Whiteboard updated     Education:    Patient left HOB elevated with all lines intact and call button in reach    GOALS:   Multidisciplinary Problems     Occupational Therapy Goals     Not on file          Multidisciplinary Problems (Resolved)        Problem: Occupational Therapy Goal    Goal Priority Disciplines Outcome Interventions   Occupational Therapy Goal   (Resolved)     OT, PT/OT Met    Description: Pt does not  require skilled OT services at this time. Pt is performing all ADLs and functional mobility at [facility]. Please re-consult if any changes.                      History:     Past Medical History:   Diagnosis Date    Asthma     COPD (chronic obstructive pulmonary disease)     home O2 at night only    Coronary artery disease     Hypertension     Pneumonia     Seizures        Past Surgical History:   Procedure Laterality Date    LEFT HEART CATHETERIZATION  4/23/2020    Procedure: Left heart cath;  Surgeon: Nic Pollack MD;  Location: [facility] CATH LAB;  Service: Cardiology;;       Time Tracking:     OT Date of Treatment: 02/09/22  OT Start Time: 1037  OT Stop Time: 1046  OT Total Time (min): 9 min    Billable Minutes:Evaluation 9    2/9/2022

## 2022-02-09 NOTE — ED PROVIDER NOTES
Source of History:  Patient  Chart    Chief complaint:  Abdominal Pain (Pt arrives c/o epigastric pain. Seen yesterday for same incident. Denies SOB)      HPI:  Baltazar Mccray is a 61 y.o. male with history of hypertension, tobacco abuse, COPD on 2 L home oxygen, alcohol use, seizure disorder, grade 1 diastolic dysfunction presenting to emergency department with complaint of postprandial epigastric abdominal pain.    Patient states that this pain has been intermittent over the last 24 hours, is more prominent after eating food although it also appears spontaneously.  Pain lasts for 10 minutes at a time, is constant, severe, nonradiating.  It is not associated with fevers, or vomiting.  +Appetite loss.  He had a normal bowel movement earlier today, no diarrhea, no blood in stool.  No lower abdominal pain.  No dysuria or hematuria.  No chest pain or shortness of breath.    Per chart review, patient seen in this emergency department 2 days ago on 02/07/2022 with complaint of I feel sick.  He had been admitted to the hospital 1 week prior for a COPD exacerbation.  He reported lightheadedness and nausea.  He also had shortness of breath which was recurrent.  There is no chest pain or abdominal pain.  His EKG did not demonstrate a STEMI.  He had a stable chest x-ray.  He received Zofran and a breathing treatment.  His COVID test was negative, and labs remarkable for a bicarb of 42 which was chronic.  He was alkalotic due to hyperventilation.  Troponin was elevated but decreased from baseline.  He remained nauseous and then received Phenergan.  He requested to be discharged after tolerating p.o..    ROS: As per HPI and below:  Review of Systems   Constitutional: Negative for fever.   HENT: Negative for sore throat.    Eyes: Negative for double vision.   Respiratory: Negative for cough and shortness of breath.    Cardiovascular: Negative for chest pain.   Gastrointestinal: Positive for abdominal pain and nausea. Negative  for diarrhea and vomiting.   Genitourinary: Negative for dysuria.   Musculoskeletal: Negative for falls.   Skin: Negative for rash.   Neurological: Negative for headaches.       Review of patient's allergies indicates:   Allergen Reactions    Benazepril Swelling    Duoneb [ipratropium-albuterol]      Report minor Tremors, pt seems to be tolerating well.       No current facility-administered medications on file prior to encounter.     Current Outpatient Medications on File Prior to Encounter   Medication Sig Dispense Refill    acetaminophen (TYLENOL) 325 MG tablet Take 2 tablets (650 mg total) by mouth every 8 (eight) hours as needed for Pain or Temperature greater than (100.5).  0    albuterol (PROVENTIL/VENTOLIN HFA) 90 mcg/actuation inhaler Inhale 1-2 puffs into the lungs every 6 (six) hours as needed for Wheezing. Rescue 6.7 g 0    albuterol-ipratropium (DUO-NEB) 2.5 mg-0.5 mg/3 mL nebulizer solution Take 3 mLs by nebulization every 4 (four) hours as needed for Wheezing or Shortness of Breath. Rescue 180 mL 0    amLODIPine (NORVASC) 10 MG tablet Take 1 tablet (10 mg total) by mouth once daily.      aspirin (ECOTRIN) 81 MG EC tablet Take 1 tablet (81 mg total) by mouth once daily. 90 tablet 3    atorvastatin (LIPITOR) 40 MG tablet Take 1 tablet (40 mg total) by mouth once daily. 30 tablet 11    budesonide-glycopyr-formoterol (BREZTRI AEROSPHERE) 160-9-4.8 mcg/actuation HFAA Inhale 2 puffs into the lungs 2 (two) times daily. Rinse mouth after use. 10.7 g 0    carvediloL (COREG) 12.5 MG tablet Take 1 tablet (12.5 mg total) by mouth 2 (two) times daily with meals. 60 tablet 11    folic acid (FOLVITE) 1 MG tablet Take 1 tablet (1 mg total) by mouth once daily. 90 tablet 0    furosemide (LASIX) 20 MG tablet Take 1 tablet (20 mg total) by mouth once daily. 30 tablet 11    multivitamin Tab Take 1 tablet by mouth once daily. 90 tablet 0    nicotine (NICODERM CQ) 7 mg/24 hr Place 1 patch onto the skin once  daily. (Patient not taking: Reported on 1/31/2022) 90 patch 0    predniSONE (DELTASONE) 10 MG tablet By mouth take 4 tablets (40 mg) for 5 days then 2 tablet (20 mg) for 5 days then take 1 tablet for 5 days, then take half tablet a day 50 tablet 0    senna (SENOKOT) 8.6 mg tablet Take 1 tablet by mouth daily as needed for Constipation.      SPIRIVA WITH HANDIHALER 18 mcg inhalation capsule Inhale 1 capsule (18 mcg total) into the lungs once daily. 1 capsule 0    thiamine 100 MG tablet Take 1 tablet (100 mg total) by mouth once daily. 90 tablet 0       PMH:  As per HPI and below:  Past Medical History:   Diagnosis Date    Asthma     COPD (chronic obstructive pulmonary disease)     home O2 at night only    Coronary artery disease     Hypertension     Pneumonia     Seizures      Past Surgical History:   Procedure Laterality Date    LEFT HEART CATHETERIZATION  4/23/2020    Procedure: Left heart cath;  Surgeon: Nic Pollack MD;  Location: [facility] CATH LAB;  Service: Cardiology;;       Social History     Socioeconomic History    Marital status: Single   Tobacco Use    Smoking status: Current Some Day Smoker     Packs/day: 0.25     Types: Cigarettes    Smokeless tobacco: Never Used    Tobacco comment: 3-4 per day   Substance and Sexual Activity    Alcohol use: Yes     Alcohol/week: 2.0 standard drinks     Types: 2 Cans of beer per week     Comment: every night    Drug use: No    Sexual activity: Not Currently   Social History Narrative    Patient' nephew is currently living with him in his apartment. Patient's sister Viry ([phone]) helps manage patient's care.            6/3/21    Patient is no longer staying with family. Patient is currently staying at the [facility] and working on getting into their program for more supportive housing.      Social Determinants of Health     Financial Resource Strain: Low Risk     Difficulty of Paying Living Expenses: Not very hard   Food Insecurity: No Food  Insecurity    Worried About Running Out of Food in the Last Year: Never true    Ran Out of Food in the Last Year: Never true   Transportation Needs: No Transportation Needs    Lack of Transportation (Medical): No    Lack of Transportation (Non-Medical): No   Physical Activity: Inactive    Days of Exercise per Week: 0 days    Minutes of Exercise per Session: 0 min   Stress: No Stress Concern Present    Feeling of Stress : Only a little   Social Connections: Unknown    Frequency of Communication with Friends and Family: More than three times a week    Frequency of Social Gatherings with Friends and Family: More than three times a week    Attends Temple Services: Never    Active Member of Clubs or Organizations: No    Attends Club or Organization Meetings: Never   Housing Stability: Unknown    Unable to Pay for Housing in the Last Year: No    Unstable Housing in the Last Year: No       Family History   Problem Relation Age of Onset    Cancer Father     Hypertension Sister     Stroke Sister     Stroke Brother     Diabetes Neg Hx     Heart disease Neg Hx        Physical Exam:      Vitals:    02/09/22 0307   BP:    Pulse:    Resp: 16   Temp:      Gen: No acute distress.  Nontoxic.  Well appearing.  Mental Status:  Alert and oriented .  Appropriate, conversant.  Skin: Warm, dry. No rashes seen.  Eyes: No conjunctival injection.  Pulm: CTAB. No increased work of breathing.  No significant tachypnea.  No audible stridor or wheezing.  No conversational dyspnea.    CV: Regular rate. Regular rhythm.   Abd: Soft.  Not distended.  Extremely tender to the epigastric and right upper quadrant without rebound tenderness or guarding.  MSK: Good range of motion all joints.  No deformities.    Neuro: Awake. Speech normal. No focal neuro deficit observed.      Laboratory Studies:  Labs Reviewed   CBC W/ AUTO DIFFERENTIAL - Abnormal; Notable for the following components:       Result Value    MCV 79 (*)     MCH 26.5  (*)     RDW 19.6 (*)     Immature Granulocytes 0.8 (*)     Gran # (ANC) 8.8 (*)     Immature Grans (Abs) 0.10 (*)     Gran % 73.7 (*)     Lymph % 16.9 (*)     All other components within normal limits   COMPREHENSIVE METABOLIC PANEL - Abnormal; Notable for the following components:    Sodium 131 (*)     Chloride 79 (*)     CO2 41 (*)     Calcium 13.8 (*)     Albumin 3.3 (*)     Total Bilirubin 1.2 (*)     AST 62 (*)     ALT 97 (*)     All other components within normal limits   URINALYSIS, REFLEX TO URINE CULTURE - Abnormal; Notable for the following components:    Specific Gravity, UA >=1.030 (*)     Ketones, UA Trace (*)     All other components within normal limits    Narrative:     Specimen Source->Urine   TROPONIN I - Abnormal; Notable for the following components:    Troponin I 0.071 (*)     All other components within normal limits   B-TYPE NATRIURETIC PEPTIDE - Abnormal; Notable for the following components:     (*)     All other components within normal limits   CALCIUM, IONIZED - Abnormal; Notable for the following components:    Ionized Calcium 1.46 (*)     All other components within normal limits   LIPASE   PTH, INTACT   SARS-COV-2 RDRP GENE    Narrative:     This test utilizes isothermal nucleic acid amplification   technology to detect the SARS-CoV-2 RdRp nucleic acid segment.   The analytical sensitivity (limit of detection) is 125 genome   equivalents/mL.   A POSITIVE result implies infection with the SARS-CoV-2 virus;   the patient is presumed to be contagious.     A NEGATIVE result means that SARS-CoV-2 nucleic acids are not   present above the limit of detection. A NEGATIVE result should be   treated as presumptive. It does not rule out the possibility of   COVID-19 and should not be the sole basis for treatment decisions.   If COVID-19 is strongly suspected based on clinical and exposure   history, re-testing using an alternate molecular assay should be   considered.   This test is only  for use under the Food and Drug   Administration s Emergency Use Authorization (EUA).   Commercial kits are provided by [employer].   Performance characteristics of the EUA have been independently   verified by [facility] Department of   Pathology and Laboratory Medicine.   _________________________________________________________________   The authorized Fact Sheet for Healthcare Providers and the authorized Fact   Sheet for Patients of the ID NOW COVID-19 are available on the FDA   website:     https://www.fda.gov/media/287191/download  https://www.fda.gov/media/592361/download             EKG (independently interpreted by me):  Normal sinus rhythm, rate 90. No STEMI.  QRS 82 milliseconds.   milliseconds.    Chart reviewed.    Imaging Results          US Abdomen Limited (Gallbladder) (Final result)  Result time 02/09/22 01:56:39    Final result by Antony Samayoa MD (02/09/22 01:56:39)                 Impression:      Two small gallbladder polyps.  No evidence of cholecystitis.    Electronically signed by resident: Rowan Castellano  Date:    02/09/2022  Time:    01:45    Electronically signed by: Antony Samayoa MD  Date:    02/09/2022  Time:    01:56             Narrative:    EXAMINATION:  US ABDOMEN LIMITED    CLINICAL HISTORY:  Right upper quadrant pain    TECHNIQUE:  Limited ultrasound of the right upper quadrant of the abdomen (including pancreas, liver, gallbladder, common bile duct, and spleen) was performed.    COMPARISON:  CT abdomen pelvis 02/09/2022.  Complete abdominal ultrasound 01/29/2022.    FINDINGS:  Pancreas is unremarkable.    Gallbladder: No calculi, wall thickening, or pericholecystic fluid.  No sonographic Hernadez's sign.Two small gallbladder polyps measuring 0.3 cm and 0.2 cm.    Biliary system: The common duct is not dilated, measuring 3 mm. No intrahepatic ductal dilatation.    Miscellaneous: No upper abdominal ascites.                               CT Abdomen  Pelvis With Contrast (Final result)  Result time 02/09/22 01:43:44    Final result by Antony Samayoa MD (02/09/22 01:43:44)                 Impression:      No acute abdominopelvic abnormalities in this patient with epigastric pain.    Hepatic steatosis.    Small peripheral wedge-shaped area of decreased attenuation in the spleen posteriorly, possible small splenic infarct.    Trace free fluid in the pelvis.    Additional findings as above.    Electronically signed by resident: Rowan Castellano  Date:    02/09/2022  Time:    01:15    Electronically signed by: Antony Samayoa MD  Date:    02/09/2022  Time:    01:43             Narrative:    EXAMINATION:  CT ABDOMEN PELVIS WITH CONTRAST    CLINICAL HISTORY:  Epigastric pain;    TECHNIQUE:  Low dose axial images, sagittal and coronal reformations were obtained from the lung bases to the pubic symphysis following the IV administration of 100 mL of Omnipaque 350.  Oral contrast was not administered.    COMPARISON:  Complete abdominal ultrasound 01/29/2020.  CTA chest 01/28/2022.    FINDINGS:  Heart: No cardiomegaly or pericardial effusion.    Lung Bases: Subsegmental atelectasis in left lung base.    Liver: Diffusely hypoattenuating liver parenchyma, suggestive steatosis.  No focal hepatic lesions.    Gallbladder: No calcified gallstones.    Bile Ducts: No dilatation.    Pancreas: No mass. No peripancreatic fat stranding.    Spleen: Punctate calcifications, suggestive of prior granulomatous disease.  Small peripheral area of wedge-shaped decreased attenuation in the spleen posteriorly.    Adrenals: Unremarkable    Kidneys/Ureters: Subcentimeter hypodensities bilaterally, too small to characterize.  Small cyst left lower pole.  No hydronephrosis.  Bilateral ureters normal course and caliber.    Bladder: No wall thickening.    Reproductive organs: Unremarkable    GI Tract/Mesentery: No evidence of bowel obstruction or inflammation. Appendix is normal.  Trace free fluid  in the pelvis.    Peritoneal Space: Trace free fluid in the pelvis.  No free peritoneal air    Retroperitoneum: No significant adenopathy.    Abdominal wall: Normal.    Vasculature: No aneurysm.  Mild atherosclerosis of the descending aorta and its branches    Bones: No acute fracture. No suspicious lytic or sclerotic lesions.                                Medications Given:  Medications   calcitonin injection 254 Units (has no administration in time range)   sodium chloride 0.9% bolus 500 mL (0 mLs Intravenous Stopped 2/9/22 0145)   morphine injection 6 mg (6 mg Intravenous Given 2/9/22 0111)   ondansetron injection 4 mg (4 mg Intravenous Given 2/9/22 0111)   iohexoL (OMNIPAQUE 350) injection 100 mL (100 mLs Intravenous Given 2/9/22 0106)   morphine injection 6 mg (6 mg Intravenous Given 2/9/22 0307)       Discussed with: VARUN    MDM:    61 y.o. male with right upper quadrant and epigastric pain.  He is afebrile, stable, nontoxic.    Will give small fluid bolus, morphine, Zofran, and reassess.    Labs reviewed.  No leukocytosis.  CMP is remarkable for a calcium of 13.8, which corrects to 14.4.  Of note, calcium was up trending during his ER visit 2 days ago.  At baseline and he has a normal calcium, was 11.1 in the ER 2 days ago.  Troponin is flat.    Imaging is unremarkable.  Patient reassessed, continues to have abdominal discomfort.  Will re-dose morphine.    With respect to the hypercalcemia, he already received fluids.  He has a history of diastolic count grocery of will not give him a large fluid bolus.  Calcitonin ordered.  Admitted to Internal Medicine in stable condition.    Diagnostic Impression:    1. Hypercalcemia    2. Epigastric abdominal pain    3. Right upper quadrant abdominal pain         ED Disposition Condition    Admit              Patient understands the plan and is in agreement, verbalized understanding, questions answered    Vangie Sharpe MD  Emergency Medicine       Vangie Sharpe,  MD  02/09/22 0347

## 2022-02-10 ENCOUNTER — ANESTHESIA EVENT (OUTPATIENT)
Dept: ENDOSCOPY | Facility: HOSPITAL | Age: 62
DRG: 641 | End: 2022-02-10
Payer: MEDICAID

## 2022-02-10 LAB
ALBUMIN SERPL BCP-MCNC: 2.7 G/DL (ref 3.5–5.2)
ALP SERPL-CCNC: 61 U/L (ref 55–135)
ALT SERPL W/O P-5'-P-CCNC: 52 U/L (ref 10–44)
ANION GAP SERPL CALC-SCNC: 6 MMOL/L (ref 8–16)
AST SERPL-CCNC: 27 U/L (ref 10–40)
BASOPHILS # BLD AUTO: 0.02 K/UL (ref 0–0.2)
BASOPHILS NFR BLD: 0.2 % (ref 0–1.9)
BILIRUB SERPL-MCNC: 1.2 MG/DL (ref 0.1–1)
BUN SERPL-MCNC: 16 MG/DL (ref 6–20)
CA-I BLDV-SCNC: 1.2 MMOL/L (ref 1.06–1.42)
CALCIUM SERPL-MCNC: 9.7 MG/DL (ref 8.7–10.5)
CHLORIDE SERPL-SCNC: 87 MMOL/L (ref 95–110)
CO2 SERPL-SCNC: 38 MMOL/L (ref 23–29)
CREAT SERPL-MCNC: 0.9 MG/DL (ref 0.5–1.4)
DIFFERENTIAL METHOD: ABNORMAL
EOSINOPHIL # BLD AUTO: 0.2 K/UL (ref 0–0.5)
EOSINOPHIL NFR BLD: 2.8 % (ref 0–8)
ERYTHROCYTE [DISTWIDTH] IN BLOOD BY AUTOMATED COUNT: 19 % (ref 11.5–14.5)
EST. GFR  (AFRICAN AMERICAN): >60 ML/MIN/1.73 M^2
EST. GFR  (NON AFRICAN AMERICAN): >60 ML/MIN/1.73 M^2
ESTIMATED AVG GLUCOSE: 117 MG/DL (ref 68–131)
GLUCOSE SERPL-MCNC: 88 MG/DL (ref 70–110)
HBA1C MFR BLD: 5.7 % (ref 4–5.6)
HCT VFR BLD AUTO: 38.2 % (ref 40–54)
HGB BLD-MCNC: 12.4 G/DL (ref 14–18)
IMM GRANULOCYTES # BLD AUTO: 0.06 K/UL (ref 0–0.04)
IMM GRANULOCYTES NFR BLD AUTO: 0.7 % (ref 0–0.5)
LYMPHOCYTES # BLD AUTO: 1.5 K/UL (ref 1–4.8)
LYMPHOCYTES NFR BLD: 18.5 % (ref 18–48)
MAGNESIUM SERPL-MCNC: 1.7 MG/DL (ref 1.6–2.6)
MCH RBC QN AUTO: 26.3 PG (ref 27–31)
MCHC RBC AUTO-ENTMCNC: 32.5 G/DL (ref 32–36)
MCV RBC AUTO: 81 FL (ref 82–98)
MONOCYTES # BLD AUTO: 0.5 K/UL (ref 0.3–1)
MONOCYTES NFR BLD: 6.4 % (ref 4–15)
NEUTROPHILS # BLD AUTO: 5.9 K/UL (ref 1.8–7.7)
NEUTROPHILS NFR BLD: 71.4 % (ref 38–73)
NRBC BLD-RTO: 0 /100 WBC
PHOSPHATE SERPL-MCNC: 2.9 MG/DL (ref 2.7–4.5)
PLATELET # BLD AUTO: 183 K/UL (ref 150–450)
PMV BLD AUTO: 10.7 FL (ref 9.2–12.9)
POCT GLUCOSE: 129 MG/DL (ref 70–110)
POTASSIUM SERPL-SCNC: 3.7 MMOL/L (ref 3.5–5.1)
PROT SERPL-MCNC: 5.7 G/DL (ref 6–8.4)
RBC # BLD AUTO: 4.72 M/UL (ref 4.6–6.2)
SODIUM SERPL-SCNC: 131 MMOL/L (ref 136–145)
WBC # BLD AUTO: 8.25 K/UL (ref 3.9–12.7)

## 2022-02-10 PROCEDURE — 99233 PR SUBSEQUENT HOSPITAL CARE,LEVL III: ICD-10-PCS | Mod: ,,, | Performed by: HOSPITALIST

## 2022-02-10 PROCEDURE — 82330 ASSAY OF CALCIUM: CPT | Performed by: STUDENT IN AN ORGANIZED HEALTH CARE EDUCATION/TRAINING PROGRAM

## 2022-02-10 PROCEDURE — 99233 SBSQ HOSP IP/OBS HIGH 50: CPT | Mod: ,,, | Performed by: HOSPITALIST

## 2022-02-10 PROCEDURE — 36415 COLL VENOUS BLD VENIPUNCTURE: CPT | Performed by: STUDENT IN AN ORGANIZED HEALTH CARE EDUCATION/TRAINING PROGRAM

## 2022-02-10 PROCEDURE — 99222 1ST HOSP IP/OBS MODERATE 55: CPT | Mod: 25,,, | Performed by: INTERNAL MEDICINE

## 2022-02-10 PROCEDURE — 25000003 PHARM REV CODE 250: Performed by: STUDENT IN AN ORGANIZED HEALTH CARE EDUCATION/TRAINING PROGRAM

## 2022-02-10 PROCEDURE — S4991 NICOTINE PATCH NONLEGEND: HCPCS | Performed by: STUDENT IN AN ORGANIZED HEALTH CARE EDUCATION/TRAINING PROGRAM

## 2022-02-10 PROCEDURE — 94761 N-INVAS EAR/PLS OXIMETRY MLT: CPT

## 2022-02-10 PROCEDURE — 97161 PT EVAL LOW COMPLEX 20 MIN: CPT

## 2022-02-10 PROCEDURE — 85025 COMPLETE CBC W/AUTO DIFF WBC: CPT | Performed by: STUDENT IN AN ORGANIZED HEALTH CARE EDUCATION/TRAINING PROGRAM

## 2022-02-10 PROCEDURE — 80053 COMPREHEN METABOLIC PANEL: CPT | Performed by: STUDENT IN AN ORGANIZED HEALTH CARE EDUCATION/TRAINING PROGRAM

## 2022-02-10 PROCEDURE — 99900035 HC TECH TIME PER 15 MIN (STAT)

## 2022-02-10 PROCEDURE — 27000221 HC OXYGEN, UP TO 24 HOURS

## 2022-02-10 PROCEDURE — 99222 PR INITIAL HOSPITAL CARE,LEVL II: ICD-10-PCS | Mod: 25,,, | Performed by: INTERNAL MEDICINE

## 2022-02-10 PROCEDURE — 25000003 PHARM REV CODE 250

## 2022-02-10 PROCEDURE — 83735 ASSAY OF MAGNESIUM: CPT | Performed by: STUDENT IN AN ORGANIZED HEALTH CARE EDUCATION/TRAINING PROGRAM

## 2022-02-10 PROCEDURE — 83036 HEMOGLOBIN GLYCOSYLATED A1C: CPT | Performed by: STUDENT IN AN ORGANIZED HEALTH CARE EDUCATION/TRAINING PROGRAM

## 2022-02-10 PROCEDURE — 84100 ASSAY OF PHOSPHORUS: CPT | Performed by: STUDENT IN AN ORGANIZED HEALTH CARE EDUCATION/TRAINING PROGRAM

## 2022-02-10 PROCEDURE — 20600001 HC STEP DOWN PRIVATE ROOM

## 2022-02-10 PROCEDURE — 25000242 PHARM REV CODE 250 ALT 637 W/ HCPCS: Performed by: STUDENT IN AN ORGANIZED HEALTH CARE EDUCATION/TRAINING PROGRAM

## 2022-02-10 PROCEDURE — 63600175 PHARM REV CODE 636 W HCPCS: Performed by: STUDENT IN AN ORGANIZED HEALTH CARE EDUCATION/TRAINING PROGRAM

## 2022-02-10 RX ORDER — OXYCODONE HYDROCHLORIDE 5 MG/1
5 TABLET ORAL EVERY 6 HOURS PRN
Status: DISCONTINUED | OUTPATIENT
Start: 2022-02-10 | End: 2022-02-12 | Stop reason: HOSPADM

## 2022-02-10 RX ORDER — POLYETHYLENE GLYCOL 3350 17 G/17G
17 POWDER, FOR SOLUTION ORAL ONCE
Status: COMPLETED | OUTPATIENT
Start: 2022-02-10 | End: 2022-02-10

## 2022-02-10 RX ADMIN — ACETAMINOPHEN 650 MG: 325 TABLET ORAL at 09:02

## 2022-02-10 RX ADMIN — PANTOPRAZOLE SODIUM 40 MG: 40 TABLET, DELAYED RELEASE ORAL at 08:02

## 2022-02-10 RX ADMIN — OXYCODONE 5 MG: 5 TABLET ORAL at 10:02

## 2022-02-10 RX ADMIN — ENOXAPARIN SODIUM 40 MG: 100 INJECTION SUBCUTANEOUS at 04:02

## 2022-02-10 RX ADMIN — SENNOSIDES 1 TABLET: 8.6 TABLET ORAL at 09:02

## 2022-02-10 RX ADMIN — POLYETHYLENE GLYCOL 3350 17 G: 17 POWDER, FOR SOLUTION ORAL at 04:02

## 2022-02-10 RX ADMIN — NICOTINE 1 PATCH: 7 PATCH, EXTENDED RELEASE TRANSDERMAL at 08:02

## 2022-02-10 RX ADMIN — ASPIRIN 81 MG: 81 TABLET, COATED ORAL at 08:02

## 2022-02-10 RX ADMIN — AMLODIPINE BESYLATE 10 MG: 10 TABLET ORAL at 08:02

## 2022-02-10 RX ADMIN — CARVEDILOL 12.5 MG: 12.5 TABLET, FILM COATED ORAL at 04:02

## 2022-02-10 RX ADMIN — FOLIC ACID 1 MG: 1 TABLET ORAL at 08:02

## 2022-02-10 RX ADMIN — ACETAMINOPHEN 650 MG: 325 TABLET ORAL at 05:02

## 2022-02-10 RX ADMIN — TIOTROPIUM BROMIDE INHALATION SPRAY 2 PUFF: 3.12 SPRAY, METERED RESPIRATORY (INHALATION) at 10:02

## 2022-02-10 RX ADMIN — ATORVASTATIN CALCIUM 40 MG: 20 TABLET, FILM COATED ORAL at 08:02

## 2022-02-10 RX ADMIN — SENNOSIDES 1 TABLET: 8.6 TABLET ORAL at 10:02

## 2022-02-10 RX ADMIN — CARVEDILOL 12.5 MG: 12.5 TABLET, FILM COATED ORAL at 08:02

## 2022-02-10 RX ADMIN — Medication 100 MG: at 08:02

## 2022-02-10 NOTE — ANESTHESIA PREPROCEDURE EVALUATION
[facility]  Anesthesia Pre-Operative Evaluation         Patient Name: Baltazar Mccray  YOB: 1960  MRN: [id number]    SUBJECTIVE:     Pre-operative evaluation for Procedure(s) (LRB):  EGD (ESOPHAGOGASTRODUODENOSCOPY) (N/A)     02/10/2022    Baltazar Mccray is a 61 y.o. male w/ a significant PMHx of HTN, HLD, COPD, remote seizure disorder, HFpEF admitted with hypercalcemia and epigastric pain.    Patient now presents for the above procedure(s).      LDA:       Peripheral IV - Single Lumen 02/09/22 0024 22 G Left Hand (Active)   Site Assessment Clean;Dry;Intact;No redness;No swelling 02/10/22 1200   Extremity Assessment Distal to IV No redness;No swelling;No warmth 02/10/22 0000   Line Status Flushed;Saline locked 02/10/22 0000   Dressing Status Clean;Dry;Intact 02/10/22 0000   Dressing Intervention Integrity maintained 02/10/22 0000   Dressing Change Due 02/13/22 02/10/22 0000   Site Change Due 02/13/22 02/10/22 0000   Reason Not Rotated Not due 02/10/22 0000   Number of days: 1        Prev airway: None documented.    Drips: None documented.       Patient Active Problem List   Diagnosis    COPD exacerbation    Lung nodule    Essential hypertension    Hx of seizure disorder    Elevated troponin    Chronic respiratory failure    Tobacco abuse    Acute on chronic respiratory failure with hypoxia and hypercapnia    Chronic combined systolic and diastolic congestive heart failure    Nonobstructive atherosclerosis of coronary artery    Chronic obstructive pulmonary disease    Abnormal chest CT    Cigarette nicotine dependence without complication    Chronic diastolic heart failure    Alcohol abuse    Alcohol use disorder, mild, abuse    Aspiration pneumonia    Poor social situation    Elevated liver enzymes    Atrial fibrillation with rapid ventricular response    Hypercalcemia    Epigastric pain       Review of patient's allergies indicates:   Allergen Reactions     Benazepril Swelling    Duoneb [ipratropium-albuterol]      Report minor Tremors, pt seems to be tolerating well.       Current Outpatient Medications:    Current Facility-Administered Medications:     acetaminophen tablet 650 mg, 650 mg, Oral, Q4H PRN, Amber Blas MD, 650 mg at 02/10/22 0937    albuterol inhaler 1 puff, 1 puff, Inhalation, Q6H PRN, Amber Blas MD, 1 puff at 02/09/22 1946    amLODIPine tablet 10 mg, 10 mg, Oral, Daily, Amber Blas MD, 10 mg at 02/10/22 0821    aspirin EC tablet 81 mg, 81 mg, Oral, Daily, Amber Blas MD, 81 mg at 02/10/22 0821    atorvastatin tablet 40 mg, 40 mg, Oral, Daily, Amber Blas MD, 40 mg at 02/10/22 0821    carvediloL tablet 12.5 mg, 12.5 mg, Oral, BID WM, Amber Blas MD, 12.5 mg at 02/10/22 0821    dextrose 10% bolus 125 mL, 12.5 g, Intravenous, PRN, Amber Blas MD    dextrose 10% bolus 250 mL, 25 g, Intravenous, PRN, Amber Blas MD    enoxaparin injection 40 mg, 40 mg, Subcutaneous, Daily, Amber Blas MD, 40 mg at 02/09/22 1621    folic acid tablet 1 mg, 1 mg, Oral, Daily, Amber Blas MD, 1 mg at 02/10/22 0821    glucagon (human recombinant) injection 1 mg, 1 mg, Intramuscular, PRN, Amber Blas MD    glucose chewable tablet 16 g, 16 g, Oral, PRN, Amber Blas MD    glucose chewable tablet 24 g, 24 g, Oral, PRN, Amber Blas MD    insulin aspart U-100 pen 0-5 Units, 0-5 Units, Subcutaneous, QID (AC + HS) PRN, Amber Blas MD    naloxone 0.4 mg/mL injection 0.02 mg, 0.02 mg, Intravenous, PRN, Amber Blas MD    nicotine 7 mg/24 hr 1 patch, 1 patch, Transdermal, Daily, Amber Blas MD, 1 patch at 02/10/22 0823    ondansetron injection 4 mg, 4 mg, Intravenous, Q8H PRN, Amber Blas MD    oxyCODONE immediate release tablet 5 mg, 5 mg, Oral, Q6H PRN, Hawk Gonzales MD, 5 mg at 02/10/22 1032    pantoprazole EC tablet 40 mg, 40 mg, Oral, Daily, Amber Blas MD, 40 mg at 02/10/22 0821    senna tablet 1 tablet, 1  tablet, Oral, Daily PRN, Amber Blas MD, 1 tablet at 02/10/22 1037    sodium chloride 0.9% flush 10 mL, 10 mL, Intravenous, Q12H PRN, Amber Blas MD    thiamine tablet 100 mg, 100 mg, Oral, Daily, Amber Blas MD, 100 mg at 02/10/22 0821    tiotropium bromide 2.5 mcg/actuation inhaler 2 puff, 2 puff, Inhalation, Daily, Amber Blas MD, 2 puff at 02/10/22 1039    Past Surgical History:   Procedure Laterality Date    LEFT HEART CATHETERIZATION  4/23/2020    Procedure: Left heart cath;  Surgeon: Nic Pollack MD;  Location: [facility] CATH LAB;  Service: Cardiology;;       Social History     Socioeconomic History    Marital status: Single   Tobacco Use    Smoking status: Current Some Day Smoker     Packs/day: 0.25     Types: Cigarettes    Smokeless tobacco: Never Used    Tobacco comment: 3-4 per day   Substance and Sexual Activity    Alcohol use: Yes     Alcohol/week: 2.0 standard drinks     Types: 2 Cans of beer per week     Comment: every night    Drug use: No    Sexual activity: Not Currently   Social History Narrative    Patient' nephew is currently living with him in his apartment. Patient's sister Viry ([phone]) helps manage patient's care.            6/3/21    Patient is no longer staying with family. Patient is currently staying at the [facility] and working on getting into their program for more supportive housing.      Social Determinants of Health     Financial Resource Strain: Low Risk     Difficulty of Paying Living Expenses: Not very hard   Food Insecurity: No Food Insecurity    Worried About Running Out of Food in the Last Year: Never true    Ran Out of Food in the Last Year: Never true   Transportation Needs: No Transportation Needs    Lack of Transportation (Medical): No    Lack of Transportation (Non-Medical): No   Physical Activity: Inactive    Days of Exercise per Week: 0 days    Minutes of Exercise per Session: 0 min   Stress: No Stress Concern Present    Feeling  of Stress : Only a little   Social Connections: Unknown    Frequency of Communication with Friends and Family: More than three times a week    Frequency of Social Gatherings with Friends and Family: More than three times a week    Attends Druze Services: Never    Active Member of Clubs or Organizations: No    Attends Club or Organization Meetings: Never   Housing Stability: Unknown    Unable to Pay for Housing in the Last Year: No    Unstable Housing in the Last Year: No       OBJECTIVE:     Vital Signs Range (Last 24H):  Temp:  [36.5 °C (97.7 °F)-37.2 °C (98.9 °F)]   Pulse:  [68-80]   Resp:  [16-22]   BP: (102-130)/(59-72)   SpO2:  [90 %-96 %]       Significant Labs:  Lab Results   Component Value Date    WBC 8.25 02/10/2022    HGB 12.4 (L) 02/10/2022    HCT 38.2 (L) 02/10/2022     02/10/2022    CHOL 260 (H) 04/19/2020    TRIG 145 04/19/2020    HDL 91 (H) 04/19/2020    ALT 52 (H) 02/10/2022    AST 27 02/10/2022     (L) 02/10/2022    K 3.7 02/10/2022    CL 87 (L) 02/10/2022    CREATININE 0.9 02/10/2022    BUN 16 02/10/2022    CO2 38 (H) 02/10/2022    TSH 0.188 (L) 04/19/2020    INR 1.0 04/19/2020    HGBA1C 5.7 (H) 02/10/2022       Diagnostic Studies: No relevant studies.    EKG:   Results for orders placed or performed during the hospital encounter of 02/09/22   EKG 12-lead    Collection Time: 02/08/22 11:05 PM    Narrative    Test Reason : R10.13,    Vent. Rate : 090 BPM     Atrial Rate : 090 BPM     P-R Int : 150 ms          QRS Dur : 082 ms      QT Int : 360 ms       P-R-T Axes : 082 082 087 degrees     QTc Int : 440 ms    Normal sinus rhythm  Biatrial enlargement  Septal infarct (cited on or before 08-FEB-2022)  Abnormal ECG  When compared with ECG of 07-FEB-2022 08:15,  No significant change was found  Confirmed by South Romano MD ([id number]) on 2/10/2022 9:53:27 AM    Referred By: [email or web address]   SELF           Confirmed By:South Romano MD       2D ECHO:  TTE:  Results for orders placed or  performed during the hospital encounter of 01/28/22   Echo   Result Value Ref Range    BSA 1.74 m2    TDI SEPTAL 0.09 m/s    LV LATERAL E/E' RATIO 5.88 m/s    LV SEPTAL E/E' RATIO 5.22 m/s    LA WIDTH 3.71 cm    TDI LATERAL 0.08 m/s    LVIDd 4.18 3.5 - 6.0 cm    IVS 0.73 0.6 - 1.1 cm    Posterior Wall 0.78 0.6 - 1.1 cm    LVIDs 3.04 2.1 - 4.0 cm    FS 27 28 - 44 %    LA volume 61.62 cm3    Sinus 2.86 cm    STJ 2.25 cm    Ascending aorta 2.49 cm    LV mass 93.11 g    LA size 3.78 cm    RVDD 3.26 cm    TAPSE 1.80 cm    RV S' 12.12 cm/s    Left Ventricle Relative Wall Thickness 0.37 cm    AV mean gradient 4 mmHg    AV valve area 2.76 cm2    AV Velocity Ratio 0.87     AV index (prosthetic) 0.78     MV valve area p 1/2 method 3.27 cm2    E/A ratio 0.57     Mean e' 0.09 m/s    E wave deceleration time 232.32 msec    IVRT 125.59 msec    LVOT diameter 2.12 cm    LVOT area 3.5 cm2    LVOT peak alli 1.17 m/s    LVOT peak VTI 18.59 cm    Ao peak alli 1.35 m/s    Ao VTI 23.73 cm    LVOT stroke volume 65.59 cm3    AV peak gradient 7 mmHg    E/E' ratio 5.53 m/s    MV Peak E Alli 0.47 m/s    TR Max Alli 3.43 m/s    MV stenosis pressure 1/2 time 67.37 ms    MV Peak A Alli 0.83 m/s    LV Systolic Volume 36.29 mL    LV Systolic Volume Index 20.7 mL/m2    LV Diastolic Volume 77.56 mL    LV Diastolic Volume Index 44.32 mL/m2    LA Volume Index 35.2 mL/m2    LV Mass Index 53 g/m2    RA Major Axis 3.88 cm    Left Atrium Minor Axis 5.23 cm    Left Atrium Major Axis 5.11 cm    Triscuspid Valve Regurgitation Peak Gradient 47 mmHg    LA Volume Index (Mod) 18.1 mL/m2    LA volume (mod) 31.66 cm3    RA Width 3.29 cm    Right Atrial Pressure (from IVC) 3 mmHg    EF 55 %    TV rest pulmonary artery pressure 50 mmHg    Narrative    · The left ventricle is normal in size with normal systolic function.  · Mild left atrial enlargement.  · There is pulmonary hypertension.  · The estimated ejection fraction is 55%.  · Grade I left ventricular diastolic  dysfunction.  · Study quality was technically difficult  · Normal right ventricular size with normal right ventricular systolic   function.  · Normal central venous pressure (3 mmHg).  · The estimated PA systolic pressure is 50 mmHg.  · Interatrial septal aneurysm  · Mild tricuspid regurgitation.          ALESSANDRO:  No results found for this or any previous visit.    ASSESSMENT/PLAN:                                                                                                            02/10/2022  Baltazar Mccray is a 61 y.o., male.    Anesthesia Evaluation    I have reviewed the Patient Summary Reports.    I have reviewed the Nursing Notes. I have reviewed the NPO Status.   I have reviewed the Medications.     Review of Systems  Anesthesia Hx:  Denies Family Hx of Anesthesia complications.   Denies Personal Hx of Anesthesia complications.   Social:  Non-Smoker    Hematology/Oncology:  Hematology Normal   Oncology Normal     EENT/Dental:EENT/Dental Normal   Cardiovascular:   Hypertension CAD  Dysrhythmias  CHF    Pulmonary:   COPD Asthma    Renal/:  Renal/ Normal     Hepatic/GI:  Hepatic/GI Normal    Musculoskeletal:  Musculoskeletal Normal    Neurological:   Seizures    Endocrine:  Endocrine Normal    Dermatological:  Skin Normal    Psych:  Psychiatric Normal           Physical Exam  General:  Well nourished    Airway/Jaw/Neck:  Airway Findings: Mouth Opening: Normal Tongue: Normal  General Airway Assessment: Adult  Mallampati: II  TM Distance: 4 - 6 cm  Jaw/Neck Findings:  Neck ROM: Normal ROM      Dental:  Dental Findings: In tact   Chest/Lungs:  Chest/Lungs Findings: Clear to auscultation, Normal Respiratory Rate     Heart/Vascular:  Heart Findings: Rate: Normal  Rhythm: Regular Rhythm  Sounds: Normal     Abdomen:  Abdomen Findings: Normal    Musculoskeletal:  Musculoskeletal Findings: Normal   Skin:  Skin Findings: Normal    Mental Status:  Mental Status Findings:  Cooperative, Alert and Oriented          Anesthesia Plan  Type of Anesthesia, risks & benefits discussed:  Anesthesia Type:  general, MAC    Patient's Preference:   Plan Factors:          Intra-op Monitoring Plan: standard ASA monitors  Intra-op Monitoring Plan Comments:   Post Op Pain Control Plan: multimodal analgesia, IV/PO Opioids PRN and per primary service following discharge from PACU  Post Op Pain Control Plan Comments:     Induction:   IV  Beta Blocker:  Patient is not currently on a Beta-Blocker (No further documentation required).       Informed Consent: Patient understands risks and agrees with Anesthesia plan.  Questions answered. Anesthesia consent signed with patient.  ASA Score: 3     Day of Surgery Review of History & Physical:    H&P update referred to the surgeon.         Ready For Surgery From Anesthesia Perspective.

## 2022-02-10 NOTE — PLAN OF CARE
Patient not stable for discharge a this time.  OT is recommending Home, no needs.  SW will continue to follow patient's progress to discharge. SW remains available for any family concerns or needs.      Alyce Menjivar LMSW  PRN - [facility]  [phone]

## 2022-02-10 NOTE — CONSULTS
[facility] - Oncology ([facility])  Gastroenterology  Consult Note    Patient Name: Baltazar Mccray  MRN: [id number]  Admission Date: 2/9/2022  Hospital Length of Stay: 1 days  Code Status: Full Code   Attending Provider: Jose Juan Castle MD   Consulting Provider: Luis Parisi MD  Primary Care Physician: Rhonda Of Katia  Principal Problem:Hypercalcemia    Inpatient consult to Gastroenterology  Consult performed by: Luis Parisi MD  Consult ordered by: Hawk Gonzales MD        Subjective:     HPI:  61 M w/ PMH HTN, HLD, COPD, remote seizure disorder, HFpEF, seen recently multiple times in the ED for evaluation, most recently for dizziness on 2/7, ultimately discharged home, labs and imaging unremarkable at that time.  After discharge, patient stated he began to have severe epigastric abdominal pain with worsening post-prandial.  Patient states he was taking TUMS every hour with relief but recurrence of pain.  Able to tolerate soup.  Denies NSAID use, endorses occasional cigarette smoking and 2 16oz beers daily.  Denies any fevers, chills, melena, hematochezia, hematemesis, vomiting.  In ED, patient was HDS and afebrile.  Labs notable for Hgb 16 (above baseline), hypercalcemia, mildly alkalotic.  CT imaging without etiology for patient's pain.  RUQ unremarkable for etiology of pain as well.  Hypercalcemia improved with fluids.  Patient denied history of endoscopic evaluation.      Past Medical History:   Diagnosis Date    Asthma     COPD (chronic obstructive pulmonary disease)     home O2 at night only    Coronary artery disease     Hypertension     Pneumonia     Seizures        Past Surgical History:   Procedure Laterality Date    LEFT HEART CATHETERIZATION  4/23/2020    Procedure: Left heart cath;  Surgeon: Nic Pollack MD;  Location: [facility] CATH LAB;  Service: Cardiology;;       Review of patient's allergies indicates:   Allergen Reactions    Benazepril Swelling    Duoneb  [ipratropium-albuterol]      Report minor Tremors, pt seems to be tolerating well.     Family History     Problem Relation (Age of Onset)    Cancer Father    Hypertension Sister    Stroke Sister, Brother        Tobacco Use    Smoking status: Current Some Day Smoker     Packs/day: 0.25     Types: Cigarettes    Smokeless tobacco: Never Used    Tobacco comment: 3-4 per day   Substance and Sexual Activity    Alcohol use: Yes     Alcohol/week: 2.0 standard drinks     Types: 2 Cans of beer per week     Comment: every night    Drug use: No    Sexual activity: Not Currently     Review of Systems   Constitutional: Negative.    HENT: Negative.    Eyes: Negative.    Respiratory: Negative.    Cardiovascular: Negative.    Gastrointestinal: Positive for abdominal pain.   Endocrine: Negative.    Genitourinary: Negative.    Musculoskeletal: Negative.    Skin: Negative.    Allergic/Immunologic: Negative.    Neurological: Negative.    Hematological: Negative.    Psychiatric/Behavioral: Negative.      Objective:     Vital Signs (Most Recent):  Temp: 98.5 °F (36.9 °C) (02/10/22 1133)  Pulse: 73 (02/10/22 1133)  Resp: 16 (02/10/22 1133)  BP: 112/63 (02/10/22 1133)  SpO2: (!) 90 % (02/10/22 1133) Vital Signs (24h Range):  Temp:  [97.7 °F (36.5 °C)-98.9 °F (37.2 °C)] 98.5 °F (36.9 °C)  Pulse:  [68-80] 73  Resp:  [16-22] 16  SpO2:  [90 %-96 %] 90 %  BP: (102-130)/(59-69) 112/63     Weight: 64.1 kg (141 lb 4.3 oz) (02/10/22 0000)  Body mass index is 21.48 kg/m².      Intake/Output Summary (Last 24 hours) at 2/10/2022 1535  Last data filed at 2/10/2022 0500  Gross per 24 hour   Intake 540 ml   Output 650 ml   Net -110 ml       Lines/Drains/Airways     Peripheral Intravenous Line                 Peripheral IV - Single Lumen 02/09/22 0024 22 G Left Hand 1 day                Physical Exam    Gen: NAD, lying comfortably  HENT: NCAT, oropharynx clear  Eyes: anicteric sclerae, EOMI grossly  Neck: supple, no visible masses/goiter  Cardiac:  RRR, no M/R/G, S1/S2 present  Lungs: CTAB, no crackles, no wheezes  Abd: soft, TTP in mid-epigastrum, normoactive BS, no rebound  Ext: no LE edema, warm, well perfused  Skin: skin intact on exposed body parts, no visible rashes, lesions  Neuro: A&Ox4, neuro exam grossly intact, moves all extremities  Psych: appropriate mood, affect      Significant Labs:  CBC:   Recent Labs   Lab 02/09/22  0021 02/10/22  0345   WBC 12.01 8.25   HGB 16.0 12.4*   HCT 47.8 38.2*    183     CMP:   Recent Labs   Lab 02/10/22  0812   GLU 88   CALCIUM 9.7   ALBUMIN 2.7*   PROT 5.7*   *   K 3.7   CO2 38*   CL 87*   BUN 16   CREATININE 0.9   ALKPHOS 61   ALT 52*   AST 27   BILITOT 1.2*     Coagulation: No results for input(s): PT, INR, APTT in the last 48 hours.    Significant Imaging:  Imaging results within the past 24 hours have been reviewed.    Assessment/Plan:     Epigastric pain  Patient with acute onset of epigastric pain over past 72-96 hours, worse with eating, no relieving factors.  Currently appears stable.  Reports significant TUMS use with moderate relief, denies NSAIDS.  Occasional EtOH.     Reasonable for EGD tomorrow for evaluation.  NPO@MN. Trial PPI daily.    Agree with ongoing hypercalcemia workup.  In absence of other etiology could consider milk-alkali syndrome given significant calcium carbonate OTC usage.        Thank you for your consult. I will follow-up with patient. Please contact us if you have any additional questions.    Luis Parisi MD  Gastroenterology  [facility] - Oncology ([facility])

## 2022-02-10 NOTE — ASSESSMENT & PLAN NOTE
Patient with acute onset of epigastric pain over past 72-96 hours, worse with eating, no relieving factors.  Currently appears stable.  Reports significant TUMS use with moderate relief, denies NSAIDS.  Occasional EtOH.     Reasonable for EGD tomorrow for evaluation.  NPO@MN. Trial PPI daily.    Agree with ongoing hypercalcemia workup.  In absence of other etiology could consider milk-alkali syndrome given significant calcium carbonate OTC usage.

## 2022-02-10 NOTE — PROGRESS NOTES
[facility] - Oncology ([facility])  Hospital Medicine  Progress Note    Patient Name: Baltazar Mccray  MRN: [id number]  Patient Class: IP- Inpatient   Admission Date: 2/9/2022  Length of Stay: 1 days  Attending Physician: Jose Juan Castle MD  Primary Care Provider: Jaden        Subjective:     Principal Problem:Hypercalcemia        HPI:  Mr. Mccray is a 61-year-old-man with HTN, HLD, COPD (on home 2L), tobacco abuse, seizure disorders (no longer on anti-epileptics), HFpEF (55%), who presents with post-prandial epigastric abdominal pain. Seen day prior in ED for similar complaint and requested discharge after he tolerated PO s/p phenergan.     Started 2 days ago. Intermittent and worse after eating. Last about 10 minutes, 8/10 in severity. Does not radiate. Associated with loss of appetite, nausea and increased thirst. No emesis/hematemasis. Having daily brown bowel movements. No SOB or CP. No fever or chills. No bone pain. No confusion or hallucinations.No dysuria. No weight gain or loss. No night sweats. Has not happened before. No family hx of hyperparathyroidism or MEN. Lives alone with cat. Working on quitting smoking, down to 1pp/week. Drinks 1-3 beers about 1x/week.     In ED, afebrile, HR 93, 164/98, stating well on home 2L w/o tachypnea. WBC 12. Hg 16. (bl 12.9), MCV 79. cCa 11.5 (high). Ionized Ca 1.46 (high). Cr 0.9 (bl). Bicarb 41 (at baseline). Trop 0.071. EKG NSR 90, QTc wnl, no change from prior. CT A/P negative for acute abdominopelvic abnormalities to explain epigastric pain. RUQ US with 2 small gal bladder polyps and no cholecystitis. S/p 500 NS x2 in ED and calcitonin trial. PTH, PTHrp, and MM w/u pending.       Overview/Hospital Course:  Patient admitted to hospital medicine for abdominal pain and hypercalcemia. Work up of hypercalcemia has been unremarkable thus far. Concern for possible peptic ulcer with patient's history of excessive TUMS use. 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      Interval History: NAEON. Patient states his pain is 2/10. Does have significant reaction to epigastric palpation. States that the pain is central epigastric and does not radiate. He did get relief with morphine used on 2/9. GI consult to assess the role of EGD.     Review of Systems   Constitutional: Positive for appetite change. Negative for chills and fever.   HENT: Negative for congestion and rhinorrhea.    Eyes: Negative for discharge, redness and visual disturbance.   Respiratory: Negative for cough, shortness of breath and wheezing.    Cardiovascular: Negative for chest pain, palpitations and leg swelling.   Gastrointestinal: Positive for abdominal pain and nausea. Negative for abdominal distention, constipation, diarrhea and vomiting.   Endocrine:        Thirst   Genitourinary: Negative for dysuria, frequency, hematuria and urgency.   Musculoskeletal: Negative for arthralgias, back pain and myalgias.   Skin: Negative for rash.   Neurological: Negative for tremors, light-headedness, numbness and headaches.   Psychiatric/Behavioral: Negative for confusion and hallucinations.     Objective:     Vital Signs (Most Recent):  Temp: 97.9 °F (36.6 °C) (02/10/22 1548)  Pulse: 70 (02/10/22 1548)  Resp: 16 (02/10/22 1548)  BP: 129/72 (02/10/22 1548)  SpO2: (!) 90 % (02/10/22 1548) Vital Signs (24h Range):  Temp:  [97.7 °F (36.5 °C)-98.9 °F (37.2 °C)] 97.9 °F (36.6 °C)  Pulse:  [68-80] 70  Resp:  [16-22] 16  SpO2:  [90 %-96 %] 90 %  BP: (102-130)/(59-72) 129/72     Weight: 64.1 kg (141 lb 4.3 oz)  Body mass index is 21.48 kg/m².    Intake/Output Summary (Last 24 hours) at 2/10/2022 1603  Last data filed at 2/10/2022 0500  Gross per 24 hour   Intake 540 ml   Output 650 ml   Net -110 ml      Physical Exam  Constitutional:       General: He is not in acute distress.     Appearance: Normal appearance. He is not toxic-appearing.   HENT:      Head: Normocephalic and atraumatic.      Nose: No congestion or  rhinorrhea.      Mouth/Throat:      Mouth: Mucous membranes are dry.      Pharynx: Oropharynx is clear.   Eyes:      General: No scleral icterus.     Conjunctiva/sclera: Conjunctivae normal.      Pupils: Pupils are equal, round, and reactive to light.   Neck:      Comments: No JVD  No palpable neck masses, no thyroidmegaly   Cardiovascular:      Rate and Rhythm: Normal rate and regular rhythm.      Pulses: Normal pulses.      Heart sounds: No murmur heard.      Pulmonary:      Effort: Pulmonary effort is normal. No respiratory distress.      Breath sounds: Normal breath sounds. No wheezing or rales.      Comments: On 2L NC  Abdominal:      General: Abdomen is flat. Bowel sounds are normal. There is no distension.      Palpations: Abdomen is soft.      Tenderness: There is abdominal tenderness (epigastric). There is no right CVA tenderness, left CVA tenderness or guarding.   Musculoskeletal:         General: Normal range of motion.      Cervical back: Normal range of motion and neck supple.      Right lower leg: No edema.      Left lower leg: No edema.   Skin:     General: Skin is warm and dry.   Neurological:      General: No focal deficit present.      Mental Status: He is alert and oriented to person, place, and time. Mental status is at baseline.      Sensory: No sensory deficit.      Motor: No weakness.   Psychiatric:         Behavior: Behavior normal.         Thought Content: Thought content normal.         Significant Labs:   All pertinent labs within the past 24 hours have been reviewed.  CBC:   Recent Labs   Lab 02/09/22  0021 02/10/22  0345   WBC 12.01 8.25   HGB 16.0 12.4*   HCT 47.8 38.2*    183     CMP:   Recent Labs   Lab 02/09/22  0816 02/09/22  1749 02/10/22  0812   *  130* 128* 131*   K 3.2*  3.2* 3.7 3.7   CL 82*  82* 82* 87*   CO2 36*  36* 37* 38*   GLU 95  95 98 88   BUN 19  19 21* 16   CREATININE 0.8  0.8 1.0 0.9   CALCIUM 11.6*  11.6*  11.6* 10.4 9.7   PROT 6.2 5.8* 5.7*    ALBUMIN 3.0* 2.8* 2.7*   BILITOT 1.1* 1.1* 1.2*   ALKPHOS 73 65 61   AST 43* 34 27   ALT 79* 65* 52*   ANIONGAP 12  12 9 6*   EGFRNONAA >60.0  >60.0 >60.0 >60.0       Significant Imaging: I have reviewed all pertinent imaging results/findings within the past 24 hours.      Assessment/Plan:      * Hypercalcemia  Mr. Mccray is a 61-year-old-man with HTN, HLD, COPD (on home 2L), tobacco abuse, seizure disorders (no longer on anti-epileptics), HFpEF (55%), who presents with post-prandial epigastric abdominal pain. Seen day prior in ED for similar complaint and requested discharge after he tolerated PO s/p phenergan.     In ED, afebrile, HR 93, 164/98, stating well on home 2L w/o tachypnea. WBC 12. Hg 16. (bl 12.9), MCV 79. cCa 11.5 (high). Ionized Ca 1.46 (high). Cr 0.9 (bl). Bicarb 41 (at baseline). Trop 0.071. EKG NSR 90, QTc wnl, no change from prior. CT A/P negative for acute abdominopelvic abnormalities to explain epigastric pain. RUQ US with 2 small gal bladder polyps and no cholecystitis. S/p 500 NS x2 in ED and calcitonin trial. PTH, PTHrp, and MM w/u pending.     Symptomatic moderate hypercalcemia   Suspect dehydration given concentrated Hg and U sp gravity 1.030  R/o hyperparathyroidism, r/o elevated PTHrp, r/o MM    - s/p  NS in ED  - continue IV fluid bolus as need, trend Ca, CMP BID  - cautious with continuous fluids given diastolic dysfunction  - calcitonin trial in ED, if cCa improving can schedule calcitonin q8 hrs until Ca normalizes   - if Ca continues elevated, consider bisphosphonate, zoledronic acid   - if PTH mediated, f/u with endocrine outpatient for further hyperparathyroidism workup   - Calcium improving, PTH not elevated, SPEP not elevated  - f/u PTHrp, UPEP, IF, LC    Epigastric pain  New onset epigastric pain. CT A/P unrevealing. Suspect 2/2 hypercalcemia.   S/p morphine 6 mg x2 and dilaudid in ED.   Pain resolving.     - PPI trial  - see hypercalcemia management   - prn tyelonol    - PO oxy prn  - EGD tomorrow for concern of peptic ulcer in setting of excessive TUMS use.    Chronic diastolic heart failure  TTE 1/29/2022:   · The left ventricle is normal in size with normal systolic function.  · Mild left atrial enlargement.  · There is pulmonary hypertension.  · The estimated ejection fraction is 55%.  · Grade I left ventricular diastolic dysfunction.  · Study quality was technically difficult  · Normal right ventricular size with normal right ventricular systolic function.  · Normal central venous pressure (3 mmHg).  · The estimated PA systolic pressure is 50 mmHg.  · Interatrial septal aneurysm  · Mild tricuspid regurgitation.    Not overloaded on exam. .     - monitor volume status   - continue home furosemide 20 mg qd    Chronic obstructive pulmonary disease  No SOB on home 2L.   Chronic elevated bicarb (41 on admission), suspected to be 2/2 respiratory hyperventilation from COPD    - continue prn albuterol   - continue Spriva  - consider BiPAP prn    Nonobstructive atherosclerosis of coronary artery  The 10-year ASCVD risk score (Lora HERMOSILLO Jr., et al., 2013) is: 19.6%    Values used to calculate the score:      Age: 61 years      Sex: Male      Is Non- : Yes      Diabetic: No      Tobacco smoker: Yes      Systolic Blood Pressure: 129 mmHg      Is BP treated: Yes      HDL Cholesterol: 91 mg/dL      Total Cholesterol: 260 mg/dL    - continue home statin  - trop 0.021 on admission, w/o EKG changes, f/u repeat trop     Tobacco abuse  Attempting to quit.   - nicotine patch   - cessation encouraged    Hx of seizure disorder  Stable. No recent seizures. Not on home meds.   Alcohol use 1-3 drinks 1x/week.   - watch for signs of alcohol WD    Essential hypertension  /98 on admission. Not on HCTZ.     - continue home coreg 12.5 mg BID  - amlodipine 10 mg qd      VTE Risk Mitigation (From admission, onward)         Ordered     enoxaparin injection 40 mg  Daily          02/09/22 0421     IP VTE HIGH RISK PATIENT  Once         02/09/22 0421     Place sequential compression device  Until discontinued         02/09/22 0421                Discharge Planning   RUTH: 2/12/2022     Code Status: Full Code   Is the patient medically ready for discharge?: No    Reason for patient still in hospital (select all that apply): Patient trending condition  Discharge Plan A: Home                  Hawk Gonzales MD   PGY-1  Department of Hospital Medicine   [facility] - Oncology ([facility])

## 2022-02-10 NOTE — ASSESSMENT & PLAN NOTE
Mr. Mccray is a 61-year-old-man with HTN, HLD, COPD (on home 2L), tobacco abuse, seizure disorders (no longer on anti-epileptics), HFpEF (55%), who presents with post-prandial epigastric abdominal pain. Seen day prior in ED for similar complaint and requested discharge after he tolerated PO s/p phenergan.     In ED, afebrile, HR 93, 164/98, stating well on home 2L w/o tachypnea. WBC 12. Hg 16. (bl 12.9), MCV 79. cCa 11.5 (high). Ionized Ca 1.46 (high). Cr 0.9 (bl). Bicarb 41 (at baseline). Trop 0.071. EKG NSR 90, QTc wnl, no change from prior. CT A/P negative for acute abdominopelvic abnormalities to explain epigastric pain. RUQ US with 2 small gal bladder polyps and no cholecystitis. S/p 500 NS x2 in ED and calcitonin trial. PTH, PTHrp, and MM w/u pending.     Symptomatic moderate hypercalcemia   Suspect dehydration given concentrated Hg and U sp gravity 1.030  R/o hyperparathyroidism, r/o elevated PTHrp, r/o MM    - s/p  NS in ED  - continue IV fluid bolus as need, trend Ca, CMP BID  - cautious with continuous fluids given diastolic dysfunction  - calcitonin trial in ED, if cCa improving can schedule calcitonin q8 hrs until Ca normalizes   - if Ca continues elevated, consider bisphosphonate, zoledronic acid   - if PTH mediated, f/u with endocrine outpatient for further hyperparathyroidism workup   - Calcium improving, PTH not elevated, SPEP not elevated  - f/u PTHrp, UPEP, IF, LC

## 2022-02-10 NOTE — HPI
61 M w/ PMH HTN, HLD, COPD, remote seizure disorder, HFpEF, seen recently multiple times in the ED for evaluation, most recently for dizziness on 2/7, ultimately discharged home, labs and imaging unremarkable at that time.  After discharge, patient stated he began to have severe epigastric abdominal pain with worsening post-prandial.  Patient states he was taking TUMS every hour with relief but recurrence of pain.  Able to tolerate soup.  Denies NSAID use, endorses occasional cigarette smoking and 2 16oz beers daily.  Denies any fevers, chills, melena, hematochezia, hematemesis, vomiting.  In ED, patient was HDS and afebrile.  Labs notable for Hgb 16 (above baseline), hypercalcemia, mildly alkalotic.  CT imaging without etiology for patient's pain.  RUQ unremarkable for etiology of pain as well.  Hypercalcemia improved with fluids.  Patient denied history of endoscopic evaluation.

## 2022-02-10 NOTE — NURSING TRANSFER
Nursing Transfer Note      2/9/2022     Reason patient is being transferred: Admitted    Transfer to Room 811 from ED      Transfer with personal belongings and NC W 2L    Transported by TRANSPORT    Medicines sent:NONE      Chart send with patient: yES    Notified: Via Secure Chat Dr CAS Tolentino on  team 3    Patient reassessed at: 2300 02/09/2022    Upon arrival to floor: Pt connected to wall O2. Sats on 2 L NC 89%. Oriented to room and POC . Call light placed in reach. Urinal provided. VSS. Pt in no apparent distress. Will CTM

## 2022-02-10 NOTE — PLAN OF CARE
[facility] - Oncology (Hospital)  Initial Discharge Assessment       Primary Care Provider: [facility]    Admission Diagnosis: Hypercalcemia [E83.52]  Epigastric abdominal pain [R10.13]  Right upper quadrant abdominal pain [R10.11]    Admission Date: 2/9/2022  Expected Discharge Date: 2/12/2022    Discharge Barriers Identified: None    Payor: MEDICAID / Plan: MEDICAID OF [place] / Product Type: Government /     Extended Emergency Contact Information  Primary Emergency Contact: Gladis Mccray   [place]  Mobile Phone: [phone]  Relation: Sister  Secondary Emergency Contact: Viry Mccray   [place]  Mobile Phone: [phone]  Relation: Sister    Discharge Plan A: Home  Discharge Plan B: Home      [id number] - [place] - [address] AT [facility] [address]  [address] [address]  Phone: [phone] Fax: [phone]      Initial Assessment (most recent)     Adult Discharge Assessment - 02/10/22 1137        Discharge Assessment    Assessment Type Discharge Planning Assessment     Confirmed/corrected address, phone number and insurance Yes     Confirmed Demographics Correct on Facesheet     Source of Information patient     Does patient/caregiver understand observation status Yes     Communicated RUTH with patient/caregiver Date not available/Unable to determine     Reason For Admission Hypercalcemia     Lives With alone     Do you expect to return to your current living situation? Yes     Do you have help at home or someone to help you manage your care at home? No     Prior to hospitilization cognitive status: Alert/Oriented     Current cognitive status: Alert/Oriented     Walking or Climbing Stairs Difficulty none     Dressing/Bathing Difficulty none     Equipment Currently Used at Home none     Readmission within 30 days? Yes     Patient currently being followed by outpatient case management? No      Do you currently have service(s) that help you manage your care at home? No     Do you take prescription medications? Yes     Do you have prescription coverage? Yes     Coverage Medicaid of [place]     Do you have any problems affording any of your prescribed medications? No     Is the patient taking medications as prescribed? yes     Who is going to help you get home at discharge? Will need Lyft ride     How do you get to doctors appointments? public transportation     Are you on dialysis? No     Do you take coumadin? No     Discharge Plan A Home     Discharge Plan B Home     DME Needed Upon Discharge  other (see comments)   TBD    Discharge Plan discussed with: Patient     Discharge Barriers Identified None                  SW met with patient in room for Discharge Planning Assessment.  Patient was able to answer questions.  Per patient, he lives in an apartment with his cat.   Per patient, he was independent with ADLS and used no DME for ambulation.  Patient will need transportation assistance from Lyft ride share services upon discharge.  All questions addressed.  Assigned SW/CM will follow for needs.    Kate Sahu, [facility] [place]  [phone]

## 2022-02-10 NOTE — SUBJECTIVE & OBJECTIVE
Past Medical History:   Diagnosis Date    Asthma     COPD (chronic obstructive pulmonary disease)     home O2 at night only    Coronary artery disease     Hypertension     Pneumonia     Seizures        Past Surgical History:   Procedure Laterality Date    LEFT HEART CATHETERIZATION  4/23/2020    Procedure: Left heart cath;  Surgeon: Nic Pollack MD;  Location: [facility] CATH LAB;  Service: Cardiology;;       Review of patient's allergies indicates:   Allergen Reactions    Benazepril Swelling    Duoneb [ipratropium-albuterol]      Report minor Tremors, pt seems to be tolerating well.     Family History     Problem Relation (Age of Onset)    Cancer Father    Hypertension Sister    Stroke Sister, Brother        Tobacco Use    Smoking status: Current Some Day Smoker     Packs/day: 0.25     Types: Cigarettes    Smokeless tobacco: Never Used    Tobacco comment: 3-4 per day   Substance and Sexual Activity    Alcohol use: Yes     Alcohol/week: 2.0 standard drinks     Types: 2 Cans of beer per week     Comment: every night    Drug use: No    Sexual activity: Not Currently     Review of Systems   Constitutional: Negative.    HENT: Negative.    Eyes: Negative.    Respiratory: Negative.    Cardiovascular: Negative.    Gastrointestinal: Positive for abdominal pain.   Endocrine: Negative.    Genitourinary: Negative.    Musculoskeletal: Negative.    Skin: Negative.    Allergic/Immunologic: Negative.    Neurological: Negative.    Hematological: Negative.    Psychiatric/Behavioral: Negative.      Objective:     Vital Signs (Most Recent):  Temp: 98.5 °F (36.9 °C) (02/10/22 1133)  Pulse: 73 (02/10/22 1133)  Resp: 16 (02/10/22 1133)  BP: 112/63 (02/10/22 1133)  SpO2: (!) 90 % (02/10/22 1133) Vital Signs (24h Range):  Temp:  [97.7 °F (36.5 °C)-98.9 °F (37.2 °C)] 98.5 °F (36.9 °C)  Pulse:  [68-80] 73  Resp:  [16-22] 16  SpO2:  [90 %-96 %] 90 %  BP: (102-130)/(59-69) 112/63     Weight: 64.1 kg (141 lb 4.3 oz) (02/10/22  0000)  Body mass index is 21.48 kg/m².      Intake/Output Summary (Last 24 hours) at 2/10/2022 1535  Last data filed at 2/10/2022 0500  Gross per 24 hour   Intake 540 ml   Output 650 ml   Net -110 ml       Lines/Drains/Airways     Peripheral Intravenous Line                 Peripheral IV - Single Lumen 02/09/22 0024 22 G Left Hand 1 day                Physical Exam    Gen: NAD, lying comfortably  HENT: NCAT, oropharynx clear  Eyes: anicteric sclerae, EOMI grossly  Neck: supple, no visible masses/goiter  Cardiac: RRR, no M/R/G, S1/S2 present  Lungs: CTAB, no crackles, no wheezes  Abd: soft, TTP in mid-epigastrum, normoactive BS, no rebound  Ext: no LE edema, warm, well perfused  Skin: skin intact on exposed body parts, no visible rashes, lesions  Neuro: A&Ox4, neuro exam grossly intact, moves all extremities  Psych: appropriate mood, affect      Significant Labs:  CBC:   Recent Labs   Lab 02/09/22  0021 02/10/22  0345   WBC 12.01 8.25   HGB 16.0 12.4*   HCT 47.8 38.2*    183     CMP:   Recent Labs   Lab 02/10/22  0812   GLU 88   CALCIUM 9.7   ALBUMIN 2.7*   PROT 5.7*   *   K 3.7   CO2 38*   CL 87*   BUN 16   CREATININE 0.9   ALKPHOS 61   ALT 52*   AST 27   BILITOT 1.2*     Coagulation: No results for input(s): PT, INR, APTT in the last 48 hours.    Significant Imaging:  Imaging results within the past 24 hours have been reviewed.

## 2022-02-10 NOTE — PT/OT/SLP EVAL
Physical Therapy Evaluation and Discharge Note    Patient Name:  Baltazar Mccray   MRN:  [id number]    Recommendations:     Discharge Recommendations:  home   Discharge Equipment Recommendations: none   Barriers to discharge: None    Assessment:     Baltazar Mccray is a 61 y.o. male admitted with a medical diagnosis of Hypercalcemia. .  At this time, patient is functioning at their prior level of function and does not require further acute PT services.     Recent Surgery: Procedure(s) (LRB):  EGD (ESOPHAGOGASTRODUODENOSCOPY) (N/A) * Surgery Date in Future *    Plan:     During this hospitalization, patient does not require further acute PT services.  Please re-consult if situation changes.      Subjective     Chief Complaint: shortness of breath  Patient/Family Comments/goals: to go home  Pain/Comfort:  · Pain Rating 1: 0/10  · Pain Rating Post-Intervention 1: 0/10    Patients cultural, spiritual, Adventism conflicts given the current situation: no    Living Environment:  Pt lives alone in a first floor apartment with 0STE.  Prior to admission, patients level of function was (I).  Equipment used at home: none.   Upon discharge, patient will have assistance from brother.    Objective:     Communicated with RN prior to session.  Patient found HOB elevated with oxygen,telemetry upon PT entry to room.    General Precautions: Standard, fall   Orthopedic Precautions:N/A   Braces: N/A   Respiratory Status: Nasal cannula, flow 2 L/min    Exams:  · Cognitive Exam:  Patient is oriented to Person, Place, Time and Situation  · Postural Exam:  Patient presented with the following abnormalities:    · -       No postural abnormalities identified  · Sensation:    · -       Intact  · RLE ROM: WFL  · RLE Strength: WFL  · LLE ROM: WFL  · LLE Strength: WFL    Functional Mobility:  · Bed Mobility:     · Rolling Right: independence  · Scooting: independence  · Supine to Sit: independence  · Sit to Supine: independence  · Transfers:     · Sit to  Stand:  independence with no AD  · Gait: Pt ambulated x 150 ft with no AD and no notable gait deviations  · Balance: good    AM-PAC 6 CLICK MOBILITY  Total Score:23       Therapeutic Activities and Exercises:   Role of PT in POC    AM-PAC 6 CLICK MOBILITY  Total Score:23     Patient left HOB elevated with all lines intact, call button in reach and RN notified.    GOALS:   Multidisciplinary Problems     Physical Therapy Goals     Not on file                History:     Past Medical History:   Diagnosis Date    Asthma     COPD (chronic obstructive pulmonary disease)     home O2 at night only    Coronary artery disease     Hypertension     Pneumonia     Seizures        Past Surgical History:   Procedure Laterality Date    LEFT HEART CATHETERIZATION  4/23/2020    Procedure: Left heart cath;  Surgeon: Nic Pollack MD;  Location: [facility] CATH LAB;  Service: Cardiology;;       Time Tracking:     PT Received On: 02/10/22  PT Start Time: 1122     PT Stop Time: 1130  PT Total Time (min): 8 min     Billable Minutes: Evaluation 8      02/10/2022

## 2022-02-10 NOTE — SUBJECTIVE & OBJECTIVE
Interval History: NAEON. Patient states his pain is 2/10. Does have significant reaction to epigastric palpation. States that the pain is central epigastric and does not radiate. He did get relief with morphine used on 2/9. GI consult to assess the role of EGD.     Review of Systems   Constitutional: Positive for appetite change. Negative for chills and fever.   HENT: Negative for congestion and rhinorrhea.    Eyes: Negative for discharge, redness and visual disturbance.   Respiratory: Negative for cough, shortness of breath and wheezing.    Cardiovascular: Negative for chest pain, palpitations and leg swelling.   Gastrointestinal: Positive for abdominal pain and nausea. Negative for abdominal distention, constipation, diarrhea and vomiting.   Endocrine:        Thirst   Genitourinary: Negative for dysuria, frequency, hematuria and urgency.   Musculoskeletal: Negative for arthralgias, back pain and myalgias.   Skin: Negative for rash.   Neurological: Negative for tremors, light-headedness, numbness and headaches.   Psychiatric/Behavioral: Negative for confusion and hallucinations.     Objective:     Vital Signs (Most Recent):  Temp: 97.9 °F (36.6 °C) (02/10/22 1548)  Pulse: 70 (02/10/22 1548)  Resp: 16 (02/10/22 1548)  BP: 129/72 (02/10/22 1548)  SpO2: (!) 90 % (02/10/22 1548) Vital Signs (24h Range):  Temp:  [97.7 °F (36.5 °C)-98.9 °F (37.2 °C)] 97.9 °F (36.6 °C)  Pulse:  [68-80] 70  Resp:  [16-22] 16  SpO2:  [90 %-96 %] 90 %  BP: (102-130)/(59-72) 129/72     Weight: 64.1 kg (141 lb 4.3 oz)  Body mass index is 21.48 kg/m².    Intake/Output Summary (Last 24 hours) at 2/10/2022 1603  Last data filed at 2/10/2022 0500  Gross per 24 hour   Intake 540 ml   Output 650 ml   Net -110 ml      Physical Exam  Constitutional:       General: He is not in acute distress.     Appearance: Normal appearance. He is not toxic-appearing.   HENT:      Head: Normocephalic and atraumatic.      Nose: No congestion or rhinorrhea.       Mouth/Throat:      Mouth: Mucous membranes are dry.      Pharynx: Oropharynx is clear.   Eyes:      General: No scleral icterus.     Conjunctiva/sclera: Conjunctivae normal.      Pupils: Pupils are equal, round, and reactive to light.   Neck:      Comments: No JVD  No palpable neck masses, no thyroidmegaly   Cardiovascular:      Rate and Rhythm: Normal rate and regular rhythm.      Pulses: Normal pulses.      Heart sounds: No murmur heard.      Pulmonary:      Effort: Pulmonary effort is normal. No respiratory distress.      Breath sounds: Normal breath sounds. No wheezing or rales.      Comments: On 2L NC  Abdominal:      General: Abdomen is flat. Bowel sounds are normal. There is no distension.      Palpations: Abdomen is soft.      Tenderness: There is abdominal tenderness (epigastric). There is no right CVA tenderness, left CVA tenderness or guarding.   Musculoskeletal:         General: Normal range of motion.      Cervical back: Normal range of motion and neck supple.      Right lower leg: No edema.      Left lower leg: No edema.   Skin:     General: Skin is warm and dry.   Neurological:      General: No focal deficit present.      Mental Status: He is alert and oriented to person, place, and time. Mental status is at baseline.      Sensory: No sensory deficit.      Motor: No weakness.   Psychiatric:         Behavior: Behavior normal.         Thought Content: Thought content normal.         Significant Labs:   All pertinent labs within the past 24 hours have been reviewed.  CBC:   Recent Labs   Lab 02/09/22  0021 02/10/22  0345   WBC 12.01 8.25   HGB 16.0 12.4*   HCT 47.8 38.2*    183     CMP:   Recent Labs   Lab 02/09/22  0816 02/09/22  1749 02/10/22  0812   *  130* 128* 131*   K 3.2*  3.2* 3.7 3.7   CL 82*  82* 82* 87*   CO2 36*  36* 37* 38*   GLU 95  95 98 88   BUN 19  19 21* 16   CREATININE 0.8  0.8 1.0 0.9   CALCIUM 11.6*  11.6*  11.6* 10.4 9.7   PROT 6.2 5.8* 5.7*   ALBUMIN 3.0* 2.8*  2.7*   BILITOT 1.1* 1.1* 1.2*   ALKPHOS 73 65 61   AST 43* 34 27   ALT 79* 65* 52*   ANIONGAP 12  12 9 6*   EGFRNONAA >60.0  >60.0 >60.0 >60.0       Significant Imaging: I have reviewed all pertinent imaging results/findings within the past 24 hours.

## 2022-02-10 NOTE — ASSESSMENT & PLAN NOTE
New onset epigastric pain. CT A/P unrevealing. Suspect 2/2 hypercalcemia.   S/p morphine 6 mg x2 and dilaudid in ED.   Pain resolving.     - PPI trial  - see hypercalcemia management   - prn tyelonol   - PO oxy prn  - EGD tomorrow for concern of peptic ulcer in setting of excessive TUMS use.

## 2022-02-10 NOTE — PLAN OF CARE
POC reviewed w patient on  Arrival to unit and PRN. Denies pain. 2LNC maintains sats 89 and above. Independent, voids per urinal. No acute events overnight. Bed locked in low position call light in reach. Will CTM.

## 2022-02-10 NOTE — ASSESSMENT & PLAN NOTE
The 10-year ASCVD risk score (Lora HERMOSILLO Jr., et al., 2013) is: 19.6%    Values used to calculate the score:      Age: 61 years      Sex: Male      Is Non- : Yes      Diabetic: No      Tobacco smoker: Yes      Systolic Blood Pressure: 129 mmHg      Is BP treated: Yes      HDL Cholesterol: 91 mg/dL      Total Cholesterol: 260 mg/dL    - continue home statin  - trop 0.021 on admission, w/o EKG changes, f/u repeat trop

## 2022-02-10 NOTE — HOSPITAL COURSE
Patient admitted to hospital medicine for abdominal pain and hypercalcemia. Work up of hypercalcemia has been unremarkable thus far. Concern for possible peptic ulcer with patient's history of excessive TUMS use. 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 EGD finds large gastric ulcer. Discharged with PPI BID, repeat EGD in 8 weeks to assess for healing, and GI follow up. Patient advise to avoid TUMS.

## 2022-02-11 ENCOUNTER — ANESTHESIA (OUTPATIENT)
Dept: ENDOSCOPY | Facility: HOSPITAL | Age: 62
DRG: 641 | End: 2022-02-11
Payer: MEDICAID

## 2022-02-11 ENCOUNTER — TELEPHONE (OUTPATIENT)
Dept: GASTROENTEROLOGY | Facility: HOSPITAL | Age: 62
End: 2022-02-11
Payer: MEDICAID

## 2022-02-11 DIAGNOSIS — K25.9 GASTRIC ULCER, UNSPECIFIED CHRONICITY, UNSPECIFIED WHETHER GASTRIC ULCER HEMORRHAGE OR PERFORATION PRESENT: Primary | ICD-10-CM

## 2022-02-11 LAB
ALBUMIN SERPL BCP-MCNC: 2.5 G/DL (ref 3.5–5.2)
ALP SERPL-CCNC: 59 U/L (ref 55–135)
ALT SERPL W/O P-5'-P-CCNC: 42 U/L (ref 10–44)
ANION GAP SERPL CALC-SCNC: 11 MMOL/L (ref 8–16)
AST SERPL-CCNC: 27 U/L (ref 10–40)
BASOPHILS # BLD AUTO: 0.01 K/UL (ref 0–0.2)
BASOPHILS NFR BLD: 0.2 % (ref 0–1.9)
BILIRUB SERPL-MCNC: 0.7 MG/DL (ref 0.1–1)
BUN SERPL-MCNC: 13 MG/DL (ref 6–20)
CALCIUM SERPL-MCNC: 9.2 MG/DL (ref 8.7–10.5)
CHLORIDE SERPL-SCNC: 93 MMOL/L (ref 95–110)
CO2 SERPL-SCNC: 32 MMOL/L (ref 23–29)
CREAT SERPL-MCNC: 0.8 MG/DL (ref 0.5–1.4)
DIFFERENTIAL METHOD: ABNORMAL
EOSINOPHIL # BLD AUTO: 0.2 K/UL (ref 0–0.5)
EOSINOPHIL NFR BLD: 2.7 % (ref 0–8)
ERYTHROCYTE [DISTWIDTH] IN BLOOD BY AUTOMATED COUNT: 18.9 % (ref 11.5–14.5)
EST. GFR  (AFRICAN AMERICAN): >60 ML/MIN/1.73 M^2
EST. GFR  (NON AFRICAN AMERICAN): >60 ML/MIN/1.73 M^2
GLUCOSE SERPL-MCNC: 78 MG/DL (ref 70–110)
HCT VFR BLD AUTO: 37.6 % (ref 40–54)
HGB BLD-MCNC: 11.7 G/DL (ref 14–18)
IMM GRANULOCYTES # BLD AUTO: 0.04 K/UL (ref 0–0.04)
IMM GRANULOCYTES NFR BLD AUTO: 0.6 % (ref 0–0.5)
LYMPHOCYTES # BLD AUTO: 1.3 K/UL (ref 1–4.8)
LYMPHOCYTES NFR BLD: 19.9 % (ref 18–48)
MAGNESIUM SERPL-MCNC: 1.6 MG/DL (ref 1.6–2.6)
MCH RBC QN AUTO: 26.1 PG (ref 27–31)
MCHC RBC AUTO-ENTMCNC: 31.1 G/DL (ref 32–36)
MCV RBC AUTO: 84 FL (ref 82–98)
MONOCYTES # BLD AUTO: 0.6 K/UL (ref 0.3–1)
MONOCYTES NFR BLD: 10 % (ref 4–15)
NEUTROPHILS # BLD AUTO: 4.2 K/UL (ref 1.8–7.7)
NEUTROPHILS NFR BLD: 66.6 % (ref 38–73)
NRBC BLD-RTO: 0 /100 WBC
PHOSPHATE SERPL-MCNC: 2.2 MG/DL (ref 2.7–4.5)
PLATELET # BLD AUTO: 147 K/UL (ref 150–450)
PMV BLD AUTO: 10.2 FL (ref 9.2–12.9)
POTASSIUM SERPL-SCNC: 3.9 MMOL/L (ref 3.5–5.1)
PROT SERPL-MCNC: 5.6 G/DL (ref 6–8.4)
PTH RELATED PROT SERPL-SCNC: 0.4 PMOL/L
RBC # BLD AUTO: 4.48 M/UL (ref 4.6–6.2)
SODIUM SERPL-SCNC: 136 MMOL/L (ref 136–145)
WBC # BLD AUTO: 6.27 K/UL (ref 3.9–12.7)

## 2022-02-11 PROCEDURE — 25000242 PHARM REV CODE 250 ALT 637 W/ HCPCS: Performed by: STUDENT IN AN ORGANIZED HEALTH CARE EDUCATION/TRAINING PROGRAM

## 2022-02-11 PROCEDURE — 37000008 HC ANESTHESIA 1ST 15 MINUTES: Performed by: INTERNAL MEDICINE

## 2022-02-11 PROCEDURE — 88342 IMHCHEM/IMCYTCHM 1ST ANTB: CPT | Performed by: PATHOLOGY

## 2022-02-11 PROCEDURE — 43239 EGD BIOPSY SINGLE/MULTIPLE: CPT | Mod: ,,, | Performed by: INTERNAL MEDICINE

## 2022-02-11 PROCEDURE — 63600175 PHARM REV CODE 636 W HCPCS: Performed by: STUDENT IN AN ORGANIZED HEALTH CARE EDUCATION/TRAINING PROGRAM

## 2022-02-11 PROCEDURE — 43239 PR EGD, FLEX, W/BIOPSY, SGL/MULTI: ICD-10-PCS | Mod: ,,, | Performed by: INTERNAL MEDICINE

## 2022-02-11 PROCEDURE — 88305 TISSUE EXAM BY PATHOLOGIST: CPT | Performed by: PATHOLOGY

## 2022-02-11 PROCEDURE — 99900035 HC TECH TIME PER 15 MIN (STAT)

## 2022-02-11 PROCEDURE — 94761 N-INVAS EAR/PLS OXIMETRY MLT: CPT

## 2022-02-11 PROCEDURE — 84100 ASSAY OF PHOSPHORUS: CPT | Performed by: STUDENT IN AN ORGANIZED HEALTH CARE EDUCATION/TRAINING PROGRAM

## 2022-02-11 PROCEDURE — 88342 CHG IMMUNOCYTOCHEMISTRY: ICD-10-PCS | Mod: 26,,, | Performed by: PATHOLOGY

## 2022-02-11 PROCEDURE — 99239 PR HOSPITAL DISCHARGE DAY,>30 MIN: ICD-10-PCS | Mod: ,,, | Performed by: HOSPITALIST

## 2022-02-11 PROCEDURE — 25000003 PHARM REV CODE 250: Performed by: STUDENT IN AN ORGANIZED HEALTH CARE EDUCATION/TRAINING PROGRAM

## 2022-02-11 PROCEDURE — 43239 EGD BIOPSY SINGLE/MULTIPLE: CPT | Performed by: INTERNAL MEDICINE

## 2022-02-11 PROCEDURE — 88305 TISSUE EXAM BY PATHOLOGIST: CPT | Mod: 26,,, | Performed by: PATHOLOGY

## 2022-02-11 PROCEDURE — 99239 HOSP IP/OBS DSCHRG MGMT >30: CPT | Mod: ,,, | Performed by: HOSPITALIST

## 2022-02-11 PROCEDURE — 85025 COMPLETE CBC W/AUTO DIFF WBC: CPT | Performed by: STUDENT IN AN ORGANIZED HEALTH CARE EDUCATION/TRAINING PROGRAM

## 2022-02-11 PROCEDURE — D9220A PRA ANESTHESIA: Mod: ANES,,, | Performed by: ANESTHESIOLOGY

## 2022-02-11 PROCEDURE — D9220A PRA ANESTHESIA: ICD-10-PCS | Mod: CRNA,,, | Performed by: STUDENT IN AN ORGANIZED HEALTH CARE EDUCATION/TRAINING PROGRAM

## 2022-02-11 PROCEDURE — D9220A PRA ANESTHESIA: ICD-10-PCS | Mod: ANES,,, | Performed by: ANESTHESIOLOGY

## 2022-02-11 PROCEDURE — 37000009 HC ANESTHESIA EA ADD 15 MINS: Performed by: INTERNAL MEDICINE

## 2022-02-11 PROCEDURE — 25000003 PHARM REV CODE 250: Performed by: HOSPITALIST

## 2022-02-11 PROCEDURE — 80053 COMPREHEN METABOLIC PANEL: CPT | Performed by: STUDENT IN AN ORGANIZED HEALTH CARE EDUCATION/TRAINING PROGRAM

## 2022-02-11 PROCEDURE — 83735 ASSAY OF MAGNESIUM: CPT | Performed by: STUDENT IN AN ORGANIZED HEALTH CARE EDUCATION/TRAINING PROGRAM

## 2022-02-11 PROCEDURE — 27201012 HC FORCEPS, HOT/COLD, DISP: Performed by: INTERNAL MEDICINE

## 2022-02-11 PROCEDURE — 88342 IMHCHEM/IMCYTCHM 1ST ANTB: CPT | Mod: 26,,, | Performed by: PATHOLOGY

## 2022-02-11 PROCEDURE — D9220A PRA ANESTHESIA: Mod: CRNA,,, | Performed by: STUDENT IN AN ORGANIZED HEALTH CARE EDUCATION/TRAINING PROGRAM

## 2022-02-11 PROCEDURE — 27000221 HC OXYGEN, UP TO 24 HOURS

## 2022-02-11 PROCEDURE — S4991 NICOTINE PATCH NONLEGEND: HCPCS | Performed by: STUDENT IN AN ORGANIZED HEALTH CARE EDUCATION/TRAINING PROGRAM

## 2022-02-11 PROCEDURE — 25000003 PHARM REV CODE 250

## 2022-02-11 PROCEDURE — 36415 COLL VENOUS BLD VENIPUNCTURE: CPT | Performed by: STUDENT IN AN ORGANIZED HEALTH CARE EDUCATION/TRAINING PROGRAM

## 2022-02-11 PROCEDURE — 20600001 HC STEP DOWN PRIVATE ROOM

## 2022-02-11 PROCEDURE — 88305 TISSUE EXAM BY PATHOLOGIST: ICD-10-PCS | Mod: 26,,, | Performed by: PATHOLOGY

## 2022-02-11 RX ORDER — LIDOCAINE HYDROCHLORIDE 20 MG/ML
INJECTION INTRAVENOUS
Status: DISCONTINUED | OUTPATIENT
Start: 2022-02-11 | End: 2022-02-11

## 2022-02-11 RX ORDER — MAGNESIUM SULFATE HEPTAHYDRATE 40 MG/ML
2 INJECTION, SOLUTION INTRAVENOUS ONCE
Status: DISCONTINUED | OUTPATIENT
Start: 2022-02-11 | End: 2022-02-12 | Stop reason: HOSPADM

## 2022-02-11 RX ORDER — HYDROMORPHONE HYDROCHLORIDE 1 MG/ML
0.2 INJECTION, SOLUTION INTRAMUSCULAR; INTRAVENOUS; SUBCUTANEOUS EVERY 5 MIN PRN
Status: DISCONTINUED | OUTPATIENT
Start: 2022-02-11 | End: 2022-02-11 | Stop reason: HOSPADM

## 2022-02-11 RX ORDER — SODIUM CHLORIDE 9 MG/ML
INJECTION, SOLUTION INTRAVENOUS CONTINUOUS
Status: DISCONTINUED | OUTPATIENT
Start: 2022-02-11 | End: 2022-02-12 | Stop reason: HOSPADM

## 2022-02-11 RX ORDER — PANTOPRAZOLE SODIUM 40 MG/1
40 TABLET, DELAYED RELEASE ORAL 2 TIMES DAILY
Status: DISCONTINUED | OUTPATIENT
Start: 2022-02-11 | End: 2022-02-12 | Stop reason: HOSPADM

## 2022-02-11 RX ORDER — MAGNESIUM SULFATE HEPTAHYDRATE 40 MG/ML
2 INJECTION, SOLUTION INTRAVENOUS ONCE
Status: DISCONTINUED | OUTPATIENT
Start: 2022-02-11 | End: 2022-02-11

## 2022-02-11 RX ORDER — PROPOFOL 10 MG/ML
VIAL (ML) INTRAVENOUS CONTINUOUS PRN
Status: DISCONTINUED | OUTPATIENT
Start: 2022-02-11 | End: 2022-02-11

## 2022-02-11 RX ORDER — PROPOFOL 10 MG/ML
VIAL (ML) INTRAVENOUS
Status: DISCONTINUED | OUTPATIENT
Start: 2022-02-11 | End: 2022-02-11

## 2022-02-11 RX ORDER — SODIUM CHLORIDE 0.9 % (FLUSH) 0.9 %
3 SYRINGE (ML) INJECTION
Status: DISCONTINUED | OUTPATIENT
Start: 2022-02-11 | End: 2022-02-12 | Stop reason: HOSPADM

## 2022-02-11 RX ORDER — POLYETHYLENE GLYCOL 3350 17 G/17G
17 POWDER, FOR SOLUTION ORAL ONCE
Status: COMPLETED | OUTPATIENT
Start: 2022-02-11 | End: 2022-02-11

## 2022-02-11 RX ORDER — PANTOPRAZOLE SODIUM 40 MG/1
40 TABLET, DELAYED RELEASE ORAL 2 TIMES DAILY
Qty: 60 TABLET | Refills: 1 | Status: SHIPPED | OUTPATIENT
Start: 2022-02-11 | End: 2022-08-31

## 2022-02-11 RX ORDER — PANTOPRAZOLE SODIUM 40 MG/1
40 TABLET, DELAYED RELEASE ORAL 2 TIMES DAILY
Qty: 96 TABLET | Refills: 0 | Status: CANCELLED | OUTPATIENT
Start: 2022-02-11 | End: 2022-03-31

## 2022-02-11 RX ORDER — MIDAZOLAM HYDROCHLORIDE 1 MG/ML
INJECTION, SOLUTION INTRAMUSCULAR; INTRAVENOUS
Status: DISCONTINUED | OUTPATIENT
Start: 2022-02-11 | End: 2022-02-11

## 2022-02-11 RX ORDER — FENTANYL CITRATE 50 UG/ML
INJECTION, SOLUTION INTRAMUSCULAR; INTRAVENOUS
Status: DISCONTINUED | OUTPATIENT
Start: 2022-02-11 | End: 2022-02-11

## 2022-02-11 RX ADMIN — PROPOFOL 50 MG: 10 INJECTION, EMULSION INTRAVENOUS at 09:02

## 2022-02-11 RX ADMIN — PROPOFOL 20 MG: 10 INJECTION, EMULSION INTRAVENOUS at 10:02

## 2022-02-11 RX ADMIN — SODIUM CHLORIDE: 0.9 INJECTION, SOLUTION INTRAVENOUS at 09:02

## 2022-02-11 RX ADMIN — Medication 150 MCG/KG/MIN: at 09:02

## 2022-02-11 RX ADMIN — FENTANYL CITRATE 25 MCG: 50 INJECTION, SOLUTION INTRAMUSCULAR; INTRAVENOUS at 09:02

## 2022-02-11 RX ADMIN — TIOTROPIUM BROMIDE INHALATION SPRAY 2 PUFF: 3.12 SPRAY, METERED RESPIRATORY (INHALATION) at 12:02

## 2022-02-11 RX ADMIN — CARVEDILOL 12.5 MG: 12.5 TABLET, FILM COATED ORAL at 05:02

## 2022-02-11 RX ADMIN — POLYETHYLENE GLYCOL 3350 17 G: 17 POWDER, FOR SOLUTION ORAL at 01:02

## 2022-02-11 RX ADMIN — LIDOCAINE HYDROCHLORIDE 60 MG: 20 INJECTION, SOLUTION INTRAVENOUS at 09:02

## 2022-02-11 RX ADMIN — TOPICAL ANESTHETIC 1 EACH: 200 SPRAY DENTAL; PERIODONTAL at 09:02

## 2022-02-11 RX ADMIN — PANTOPRAZOLE SODIUM 40 MG: 40 TABLET, DELAYED RELEASE ORAL at 08:02

## 2022-02-11 RX ADMIN — MIDAZOLAM HYDROCHLORIDE 2 MG: 1 INJECTION, SOLUTION INTRAMUSCULAR; INTRAVENOUS at 09:02

## 2022-02-11 RX ADMIN — AMLODIPINE BESYLATE 10 MG: 10 TABLET ORAL at 11:02

## 2022-02-11 RX ADMIN — OXYCODONE 5 MG: 5 TABLET ORAL at 12:02

## 2022-02-11 RX ADMIN — CARVEDILOL 12.5 MG: 12.5 TABLET, FILM COATED ORAL at 11:02

## 2022-02-11 RX ADMIN — NICOTINE 1 PATCH: 7 PATCH, EXTENDED RELEASE TRANSDERMAL at 08:02
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
## 2022-02-11 NOTE — PLAN OF CARE
POC reviewed with patient. VSS throughout shift, afebrile. Denies any chest pain or palpitations. NSR on telemetry. Continues to complain of abdominal pain, well controlled with tylenol and 5mg oxycodone. Abdominal U/S of mesenteric arteries done today. GI consulted, pt will be NPO at midnight for EGD tomorrow. LBM this morning however pt c/o constipation. Senna and miralax given, awaiting effect. Calcium improved to 9.7 today. Voiding c/y/u without issue. Ambulating with steady gait. Safety maintained throughout shift. Call bell within reach.

## 2022-02-11 NOTE — H&P
Endoscopy Pre-Procedure History and Physical    PCP - [facility]    Procedure - EGD    ASA - III  Mallampati - per anesthesia  History of Anesthesia problems - no  Family history Anesthesia problems - no     HPI:  Baltazar Mccray a 61 y.o. male here for evaluation of epigastric pain.     ROS:  Constitutional: No fevers, chills, No weight loss  ENT: No allergies  CV: No chest pain  Pulm: No shortness of breath  GI: see HPI  Derm: No rash    Medical History:  has a past medical history of Asthma, Atrial fibrillation with rapid ventricular response, CHF (congestive heart failure), COPD (chronic obstructive pulmonary disease), Coronary artery disease, Hypertension, Lung nodule, Pneumonia, and Seizures.    Surgical History:  has a past surgical history that includes Left heart catheterization (4/23/2020).    Family History: family history includes Cancer in his father; Hypertension in his sister; Stroke in his brother and sister.. Otherwise no colon cancer, inflammatory bowel disease, or GI malignancies.    Social History:  reports that he has been smoking cigarettes. He has been smoking about 0.25 packs per day. He has never used smokeless tobacco. He reports current alcohol use of about 2.0 standard drinks of alcohol per week. He reports that he does not use drugs.    Review of patient's allergies indicates:   Allergen Reactions    Benazepril Swelling    Duoneb [ipratropium-albuterol]      Report minor Tremors, pt seems to be tolerating well.       Medications:   Medications Prior to Admission   Medication Sig Dispense Refill Last Dose    acetaminophen (TYLENOL) 325 MG tablet Take 2 tablets (650 mg total) by mouth every 8 (eight) hours as needed for Pain or Temperature greater than (100.5).  0     albuterol (PROVENTIL/VENTOLIN HFA) 90 mcg/actuation inhaler Inhale 1-2 puffs into the lungs every 6 (six) hours as needed for Wheezing. Rescue 6.7 g 0     albuterol-ipratropium (DUO-NEB) 2.5 mg-0.5 mg/3  mL nebulizer solution Take 3 mLs by nebulization every 4 (four) hours as needed for Wheezing or Shortness of Breath. Rescue 180 mL 0     carvediloL (COREG) 12.5 MG tablet Take 1 tablet (12.5 mg total) by mouth 2 (two) times daily with meals. 60 tablet 11     furosemide (LASIX) 20 MG tablet Take 1 tablet (20 mg total) by mouth once daily. 30 tablet 11     predniSONE (DELTASONE) 10 MG tablet By mouth take 4 tablets (40 mg) for 5 days then 2 tablet (20 mg) for 5 days then take 1 tablet for 5 days, then take half tablet a day 50 tablet 0     SPIRIVA WITH HANDIHALER 18 mcg inhalation capsule Inhale 1 capsule (18 mcg total) into the lungs once daily. 1 capsule 0     amLODIPine (NORVASC) 10 MG tablet Take 1 tablet (10 mg total) by mouth once daily. (Patient not taking: Reported on 2/9/2022)   Not Taking at Unknown time    atorvastatin (LIPITOR) 40 MG tablet Take 1 tablet (40 mg total) by mouth once daily. (Patient not taking: Reported on 2/9/2022) 30 tablet 11 Not Taking at Unknown time    budesonide-glycopyr-formoterol (BREZTRI AEROSPHERE) 160-9-4.8 mcg/actuation HFAA Inhale 2 puffs into the lungs 2 (two) times daily. Rinse mouth after use. 10.7 g 0          Objective Findings:    Vital Signs: see nursing notes    Physical Exam:  General Appearance: Well appearing in no acute distress  Eyes:    No scleral icterus  ENT: Neck supple  Lungs: CTA anteriorly  Heart:  S1, S2 normal, no murmurs heard  Abdomen: Soft, non tender, non distended with positive bowel sounds. No hepatosplenomegaly, ascites, or mass  Extremities: no edema  Skin: No rash      Labs:  Lab Results   Component Value Date    WBC 6.27 02/11/2022    HGB 11.7 (L) 02/11/2022    HCT 37.6 (L) 02/11/2022     (L) 02/11/2022    CHOL 260 (H) 04/19/2020    TRIG 145 04/19/2020    HDL 91 (H) 04/19/2020    ALT 42 02/11/2022    AST 27 02/11/2022     02/11/2022    K 3.9 02/11/2022    CL 93 (L) 02/11/2022    CREATININE 0.8 02/11/2022    BUN 13 02/11/2022     CO2 32 (H) 02/11/2022    TSH 0.188 (L) 04/19/2020    INR 1.0 04/19/2020    HGBA1C 5.7 (H) 02/10/2022         Assessment:   Baltazar Mccray is a 61 y.o. male presenting today for an EGD for evaluation of epigastric pain.       Plan:   -Proceed with scheduled procedure today. I have explained the risks and benefits of endoscopy procedures to the patient including but not limited to bleeding, perforation, infection, and death.  -Evaluation and consent for anesthesia portion of this procedure completed by anesthesia team.         Vipul Canela MD, PGY-IV  Gastroenterology Fellow  [facility]

## 2022-02-11 NOTE — DISCHARGE INSTRUCTIONS
Patient Education       Upper GI Endoscopy   Why is this procedure done?   This procedure is done to view your upper gastrointestinal (GI) tract. This includes your throat and food pipe (esophagus). It also includes your stomach and the first part of the small bowel. Some people have this test for problems like coughing or throwing up blood. Other people may be having bad belly pain or blood in their stool. You may be having trouble swallowing or problems with acid reflux.  Doctors often use this test to look for problems like:  · Ulcers  · Cancer or tumor growths  · Internal bleeding  · Swelling  · Inflammation  · Infection  · Nolasco's esophagus  · Gastroesophageal reflux disease or GERD  · Swallowing problems     What will the results be?   Your doctor may find the problem inside your body that is causing your signs. The doctor can also treat some problems while doing this procedure. This may include things like stopping bleeding or removing a growth.  What happens before the procedure?   Your doctor will take your history and do an exam. Talk to the doctor about:  · All the drugs you are taking. Be sure to include all prescription, over the counter, vitamins, and herbal supplements. Bring a list of drugs you take with you.  · Tell the doctor if you have any drug allergy.  · Any bleeding problems. Be sure to tell your doctor if you are taking any drugs that may cause bleeding. Some of these are warfarin, rivaroxaban, apixaban, ticagrelor, clopidogrel, ketorolac, ibuprofen, naproxen, or aspirin. Certain vitamins and herbs, such as garlic and fish oil, may also add to the risk for bleeding. You may need to stop these drugs as well. Talk to your doctor about them.  · When you need to stop eating or drinking before your procedure.  You will not be allowed to drive right away after the procedure. Ask a family member or a friend to drive you home.  What happens during the procedure?   · Once you are in the operating  room, the staff will put an IV in your arm to give you fluids and drugs. You will be given a drug to make you sleepy. It will also help you stay pain free during the surgery.  · Your doctor may spray a drug in your throat to numb the area.  · You will be asked to lie on your left side. The staff may put a small tube in your nose to help you breathe. Your doctor may place a tool in your mouth to keep it open during the procedure. The staff may place a suction tool in your mouth to lessen saliva flow.  · The doctor will put a special scope in your mouth and down your food pipe. It is a long, thin tube with lights and a small camera. It sends images to a screen in the operating room where the camera is being used.  · To be able to view the site clearly, gas will be pumped into your belly.  · Your doctor will use the scope to see if there are problems in your upper GI tract. Small tools may be used with the scope to fix any problems that are found. Your doctor may stop an area of bleeding or take out a tumor. The doctor may also remove a growth or take tissue samples for biopsy.  · This procedure takes about 15 to 30 minutes.  What happens after the procedure?   · You will go to the Recovery Room and the staff will watch you closely.  · You will be allowed to go home when you are awake and able to eat and drink.  · You may feel bloated after the procedure. This is from any gas the doctor may have used to help see your GI tract better.  · You may have a sore throat after the procedure. You can drink fluid once the numbing drugs in your throat wear off.  · Ask your doctor when the results will be available. Set up a visit to talk about them.  What drugs may be needed?   The doctor may order drugs to:  · Help with pain  · Decrease the acid in your stomach  What problems could happen?   · Painful swallowing  · Upset stomach  · Injury to food pipe   · Throwing up  · Tear in the esophagus  Where can I learn more?   [employer]  [employer]  https://gi.org/topics/upper-gi-endoscopy-egd/   Last Reviewed Date   2021-10-05  Consumer Information Use and Disclaimer   This information is not specific medical advice and does not replace information you receive from your health care provider. This is only a brief summary of general information. It does NOT include all information about conditions, illnesses, injuries, tests, procedures, treatments, therapies, discharge instructions or life-style choices that may apply to you. You must talk with your health care provider for complete information about your health and treatment options. This information should not be used to decide whether or not to accept your health care providers advice, instructions or recommendations. Only your health care provider has the knowledge and training to provide advice that is right for you.  Copyright   Copyright © 2021 [employer] and its affiliates and/or licensors. All rights reserved.

## 2022-02-11 NOTE — PLAN OF CARE
[facility] (Hospital)  Discharge Final Note    Primary Care Provider: [facility]    Expected Discharge Date: 2/11/2022    Final Discharge Note (most recent)     Final Note - 02/11/22 1623        Final Note    Assessment Type Final Discharge Note     Anticipated Discharge Disposition Home or Self Care     Hospital Resources/Appts/Education Provided Appointments scheduled by Navigator/Coordinator                 Important Message from Medicare             Contact Info     [facility]   Relationship: PCP - General    [address]  [facility] [address]   Phone: [phone]       Next Steps: Follow up on 2/18/2022    Instructions: Appointment is with Viktoriya Mathews NP at 12pm. Please bring discharge summary, ID and insurance card.           Frances Daley LCSW   PRN

## 2022-02-11 NOTE — TRANSFER OF CARE
"Anesthesia Transfer of Care Note    Patient: Baltazar Mccray    Procedure(s) Performed: Procedure(s) (LRB):  EGD (ESOPHAGOGASTRODUODENOSCOPY) (N/A)    Patient location: PACU    Anesthesia Type: general    Transport from OR: Transported from OR on 2-3 L/min O2 by NC with adequate spontaneous ventilation    Post pain: adequate analgesia    Post assessment: no apparent anesthetic complications and tolerated procedure well    Post vital signs: stable    Level of consciousness: awake, alert and oriented    Nausea/Vomiting: no nausea/vomiting    Complications: none    Transfer of care protocol was followed      Last vitals:   Visit Vitals  /67   Pulse 95   Temp 36.8 °C (98.2 °F) (Temporal)   Resp 16   Ht 5' 8" (1.727 m)   Wt 64 kg (141 lb)   SpO2 99%   BMI 21.44 kg/m²     "
Jessica Beasley

## 2022-02-11 NOTE — PHYSICIAN QUERY
PT Name: Baltazar Mccray  MR #: [id number]     DOCUMENTATION CLARIFICATION      CDS/: Paty Evans  RN CCDS             Contact information:[email or web address]  This form is a permanent document in the medical record.    Query Date: February 11, 2022    By submitting this query, we are merely seeking further clarification of documentation to reflect the severity of illness of your patient. Please utilize your independent clinical judgment when addressing the question(s) below.     The Medical Record contains the following:   Indicators   Supporting Clinical Findings Location in Medical Record   x Documentation/History of condition 61-year-old male with longstanding tobacco use (says he quit 2 months ago) and chronic hypoxemic respiratory failure 2/2 COPD on 2L home O2, CAD, seizure disorder and alcohol abuse here with COPD exacerbation and heart failure    Chronic diastolic heart failure  - Repeat TTE pending  - Exerts orthopnea, PND, REES  - Lasix 40 mg BID  - Strict I/O H&P- Hospital medicine PN 1/29                      H&P- Hospital medicine PN 1/29   x Lab Value(s) BNP=1,208 Lab 1/28   x Radiology Findings Chronic increased markings in the lungs, similar to prior. CXR 1/28   x Treatment/Medication Lasix IV 40mg IV x 2 1/28  Lasix IV 40 mg IV 1/29-1/30 MAR   x Other Volume overload Hospital medicine PN       Provider, please specify the acuity/chronicity of ___Diastolic CHF_____:    [ x  ] Acute on chronic   [   ] Chronic   [   ] Other (please specify): _______________   [   ] Clinically Undetermined           Please document in your progress notes daily for the duration of treatment until resolved, and include in your discharge summary.  Form No. [id number]

## 2022-02-11 NOTE — NURSING
ROADTEST  O-oxygen saturation 95-96% on RA.  A-ambulating in room and schwab independently, gait steady.  D-PIV D/C'ed x2 bilateral hands, catheter tips intact.  T-tolerating cardiac diet, appetite good.  E-voiding qs cyu, LBM 2/10 but patient c/o constipation so miralax given.  S-able to do ADL's independently.  T-AVS reviewed with patient using the teach-back method. Waiting for w/c panfilo; KUN.
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
## 2022-02-11 NOTE — TREATMENT PLAN
Brief GI treatment plan    EGD performed today.  Findings large gastric ulcer, biopsied.  Please see full endoscopy report for details.    Recommendations:    -f/u biopsies  -BID PPI for 8 weeks  -repeat EGD in 8 weeks to document healing      GI will sign off at this time.  Please call questions.    Luis Parisi MD  GI Fellow

## 2022-02-11 NOTE — DISCHARGE SUMMARY
[facility] - Oncology ([facility])  [facility] Medicine  Discharge Summary      Patient Name: Baltazar Mccray  MRN: [id number]  Patient Class: IP- Inpatient  Admission Date: 2/9/2022  Hospital Length of Stay: 2 days  Discharge Date and Time:  02/12/2022 5:24 PM  Attending Physician: Jose Juan Castle MD   Discharging Provider: Hawk Gonzales MD  Primary Care Provider: [facility] Medicine Team: [facility] 3 Hawk Gonzales MD    HPI:   Mr. Mccray is a 61-year-old-man with HTN, HLD, COPD (on home 2L), tobacco abuse, seizure disorders (no longer on anti-epileptics), HFpEF (55%), who presents with post-prandial epigastric abdominal pain. Seen day prior in ED for similar complaint and requested discharge after he tolerated PO s/p phenergan.     Started 2 days ago. Intermittent and worse after eating. Last about 10 minutes, 8/10 in severity. Does not radiate. Associated with loss of appetite, nausea and increased thirst. No emesis/hematemasis. Having daily brown bowel movements. No SOB or CP. No fever or chills. No bone pain. No confusion or hallucinations.No dysuria. No weight gain or loss. No night sweats. Has not happened before. No family hx of hyperparathyroidism or MEN. Lives alone with cat. Working on quitting smoking, down to 1pp/week. Drinks 1-3 beers about 1x/week.     In ED, afebrile, HR 93, 164/98, stating well on home 2L w/o tachypnea. WBC 12. Hg 16. (bl 12.9), MCV 79. cCa 11.5 (high). Ionized Ca 1.46 (high). Cr 0.9 (bl). Bicarb 41 (at baseline). Trop 0.071. EKG NSR 90, QTc wnl, no change from prior. CT A/P negative for acute abdominopelvic abnormalities to explain epigastric pain. RUQ US with 2 small gal bladder polyps and no cholecystitis. S/p 500 NS x2 in ED and calcitonin trial. PTH, PTHrp, and MM w/u pending.       Procedure(s) (LRB):  EGD (ESOPHAGOGASTRODUODENOSCOPY) (N/A)      Hospital Course:   Patient admitted to hospital medicine for abdominal pain and hypercalcemia. Work up of  hypercalcemia has been unremarkable thus far. Concern for possible peptic ulcer with patient's history of excessive TUMS use. 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 EGD finds large gastric ulcer. Discharged with PPI BID, repeat EGD in 8 weeks to assess for healing, and GI follow up. Patient advise to avoid TUMS.       Goals of Care Treatment Preferences:  Code Status: Full Code        Review of Systems   Constitutional: Positive for appetite change. Negative for chills and fever.   HENT: Negative for congestion and rhinorrhea.    Eyes: Negative for discharge, redness and visual disturbance.   Respiratory: Negative for cough, shortness of breath and wheezing.    Cardiovascular: Negative for chest pain, palpitations and leg swelling.   Gastrointestinal: Positive for abdominal pain and nausea. Negative for abdominal distention, constipation, diarrhea and vomiting.   Endocrine:        Thirst   Genitourinary: Negative for dysuria, frequency, hematuria and urgency.   Musculoskeletal: Negative for arthralgias, back pain and myalgias.   Skin: Negative for rash.   Neurological: Negative for tremors, light-headedness, numbness and headaches.   Psychiatric/Behavioral: Negative for confusion and hallucinations.     Objective:     Vital Signs (Most Recent):  Temp: 98.1 °F (36.7 °C) (02/12/22 1107)  Pulse: 80 (02/12/22 1107)  Resp: (!) 21 (02/12/22 1107)  BP: 102/69 (02/12/22 1107)  SpO2: (!) 91 % (02/12/22 1107) Vital Signs (24h Range):  Temp:  [97.6 °F (36.4 °C)-100.6 °F (38.1 °C)] 98.1 °F (36.7 °C)  Pulse:  [79-90] 80  Resp:  [16-21] 21  SpO2:  [90 %-100 %] 91 %  BP: (102-139)/(63-74) 102/69     Weight: 64 kg (141 lb)  Body mass index is 21.44 kg/m².  No intake or output data in the 24 hours ending 02/12/22 1426   Physical Exam  Constitutional:       General: He is not in acute distress.     Appearance: Normal appearance. He is not toxic-appearing.   HENT:      Head: Normocephalic and atraumatic.      Nose: No congestion or  rhinorrhea.      Mouth/Throat:      Mouth: Mucous membranes are dry.      Pharynx: Oropharynx is clear.   Eyes:      General: No scleral icterus.     Conjunctiva/sclera: Conjunctivae normal.      Pupils: Pupils are equal, round, and reactive to light.   Neck:      Comments: No JVD  No palpable neck masses, no thyroidmegaly   Cardiovascular:      Rate and Rhythm: Normal rate and regular rhythm.      Pulses: Normal pulses.      Heart sounds: No murmur heard.      Pulmonary:      Effort: Pulmonary effort is normal. No respiratory distress.      Breath sounds: Normal breath sounds. No wheezing or rales.      Comments: On 2L NC  Abdominal:      General: Abdomen is flat. Bowel sounds are normal. There is no distension.      Palpations: Abdomen is soft.      Tenderness: There is abdominal tenderness (epigastric). There is no right CVA tenderness, left CVA tenderness or guarding.   Musculoskeletal:         General: Normal range of motion.      Cervical back: Normal range of motion and neck supple.      Right lower leg: No edema.      Left lower leg: No edema.   Skin:     General: Skin is warm and dry.   Neurological:      General: No focal deficit present.      Mental Status: He is alert and oriented to person, place, and time. Mental status is at baseline.      Sensory: No sensory deficit.      Motor: No weakness.   Psychiatric:         Behavior: Behavior normal.         Thought Content: Thought content normal.         Significant Labs:   All pertinent labs within the past 24 hours have been reviewed.  CBC:   Recent Labs   Lab 02/11/22  0351 02/12/22  0302   WBC 6.27 7.35   HGB 11.7* 11.3*   HCT 37.6* 35.8*   * 156     CMP:   Recent Labs   Lab 02/11/22  0351      K 3.9   CL 93*   CO2 32*   GLU 78   BUN 13   CREATININE 0.8   CALCIUM 9.2   PROT 5.6*   ALBUMIN 2.5*   BILITOT 0.7   ALKPHOS 59   AST 27   ALT 42   ANIONGAP 11   EGFRNONAA >60.0       Significant Imaging: I have reviewed all pertinent imaging  results/findings within the past 24 hours.          Consults:   Consults (From admission, onward)        Status Ordering Provider     Inpatient consult to Gastroenterology  Once        Provider:  (Not yet assigned)    Completed SRINATH SCHMITT new Assessment & Plan notes have been filed under this hospital service since the last note was generated.  Service: Hospital Medicine    Final Active Diagnoses:    Diagnosis Date Noted POA    PRINCIPAL PROBLEM:  Hypercalcemia [E83.52] 02/09/2022 Yes    Epigastric pain [R10.13] 02/09/2022 Yes    Chronic diastolic heart failure [I50.32] 05/21/2021 Yes    Chronic obstructive pulmonary disease [J44.9] 04/27/2021 Yes     Chronic    Nonobstructive atherosclerosis of coronary artery [I25.10] 04/23/2020 Yes     Chronic    Tobacco abuse [Z72.0] 08/12/2018 Yes     Chronic    Hx of seizure disorder [Z86.69] 11/20/2017 Not Applicable     Chronic    Essential hypertension [I10] 05/19/2017 Yes     Chronic      Problems Resolved During this Admission:       Discharged Condition: stable    Disposition: Home or Self Care    Follow Up:   Follow-up Information     [facility] On 2/18/2022.    Why: Appointment is with Viktoriya Mathews NP at 12pm. Please bring discharge summary, ID and insurance card.   Contact information:  [address]  [facility] [id number] [phone]                       Patient Instructions:      Ambulatory referral/consult to Gastroenterology   Standing Status: Future   Referral Priority: Routine Referral Type: Consultation   Referral Reason: Specialty Services Required   Requested Specialty: Gastroenterology     Diet Adult Regular     Notify your health care provider if you experience any of the following:  persistent nausea and vomiting or diarrhea     Notify your health care provider if you experience any of the following:  severe uncontrolled pain     Notify your health care provider if you experience any of the  following:  increased confusion or weakness     Notify your health care provider if you experience any of the following:  persistent dizziness, light-headedness, or visual disturbances         Pending Diagnostic Studies:     Procedure Component Value Units Date/Time    Specimen to Pathology, Surgery Gastrointestinal tract [[id number]] Collected: 02/11/22 1011    Order Status: Sent Lab Status: In process Updated: 02/11/22 1315         Medications:  Reconciled Home Medications:      Medication List      START taking these medications    pantoprazole 40 MG tablet  Commonly known as: PROTONIX  Take 1 tablet (40 mg total) by mouth 2 (two) times daily.        CONTINUE taking these medications    acetaminophen 325 MG tablet  Commonly known as: TYLENOL  Take 2 tablets (650 mg total) by mouth every 8 (eight) hours as needed for Pain or Temperature greater than (100.5).     albuterol 90 mcg/actuation inhaler  Commonly known as: PROVENTIL/VENTOLIN HFA  Inhale 1-2 puffs into the lungs every 6 (six) hours as needed for Wheezing. Rescue     albuterol-ipratropium 2.5 mg-0.5 mg/3 mL nebulizer solution  Commonly known as: DUO-NEB  Take 3 mLs by nebulization every 4 (four) hours as needed for Wheezing or Shortness of Breath. Rescue     BREZTRI AEROSPHERE 160-9-4.8 mcg/actuation Hfaa  Generic drug: budesonide-glycopyr-formoterol  Inhale 2 puffs into the lungs 2 (two) times daily. Rinse mouth after use.     carvediloL 12.5 MG tablet  Commonly known as: COREG  Take 1 tablet (12.5 mg total) by mouth 2 (two) times daily with meals.     furosemide 20 MG tablet  Commonly known as: LASIX  Take 1 tablet (20 mg total) by mouth once daily.     predniSONE 10 MG tablet  Commonly known as: DELTASONE  By mouth take 4 tablets (40 mg) for 5 days then 2 tablet (20 mg) for 5 days then take 1 tablet for 5 days, then take half tablet a day     SPIRIVA WITH HANDIHALER 18 mcg inhalation capsule  Generic drug: tiotropium  Inhale 1 capsule (18 mcg total) into  the lungs once daily.        STOP taking these medications    amLODIPine 10 MG tablet  Commonly known as: NORVASC     atorvastatin 40 MG tablet  Commonly known as: LIPITOR            Indwelling Lines/Drains at time of discharge:   Lines/Drains/Airways     None                 Time spent on the discharge of patient: 35 minutes         Hawk Gonzales MD   PGY-1  Department of Hospital Medicine  [facility] - Oncology ([facility])

## 2022-02-11 NOTE — PROVATION PATIENT INSTRUCTIONS
Discharge Summary/Instructions after an Endoscopic Procedure  Patient Name: Baltazar Mccray  Patient MRN: [id number]  Patient YOB: 1960 Friday, February 11, 2022  Cain Ortega MD  Dear patient,  As a result of recent federal legislation (The Federal Cures Act), you may   receive lab or pathology results from your procedure in your [facility]   account before your physician is able to contact you. Your physician or   their representative will relay the results to you with their   recommendations at their soonest availability.  Thank you,  RESTRICTIONS:  During your procedure today, you received medications for sedation.  These   medications may affect your judgment, balance and coordination.  Therefore,   for 24 hours, you have the following restrictions:   - DO NOT drive a car, operate machinery, make legal/financial decisions,   sign important papers or drink alcohol.    ACTIVITY:  Today: no heavy lifting, straining or running due to procedural   sedation/anesthesia.  The following day: return to full activity including work.  DIET:  Eat and drink normally unless instructed otherwise.     TREATMENT FOR COMMON SIDE EFFECTS:  - Mild abdominal pain, nausea, belching, bloating or excessive gas:  rest,   eat lightly and use a heating pad.  - Sore Throat: treat with throat lozenges and/or gargle with warm salt   water.  - Because air was used during the procedure, expelling large amounts of air   from your rectum or belching is normal.  - If a bowel prep was taken, you may not have a bowel movement for 1-3 days.    This is normal.  SYMPTOMS TO WATCH FOR AND REPORT TO YOUR PHYSICIAN:  1. Abdominal pain or bloating, other than gas cramps.  2. Chest pain.  3. Back pain.  4. Signs of infection such as: chills or fever occurring within 24 hours   after the procedure.  5. Rectal bleeding, which would show as bright red, maroon, or black stools.   (A tablespoon of blood from the rectum is not serious, especially if    hemorrhoids are present.)  6. Vomiting.  7. Weakness or dizziness.  GO DIRECTLY TO THE NEAREST EMERGENCY ROOM IF YOU HAVE ANY OF THE FOLLOWING:      Difficulty breathing              Chills and/or fever over 101 F   Persistent vomiting and/or vomiting blood   Severe abdominal pain   Severe chest pain   Black, tarry stools   Bleeding- more than one tablespoon   Any other symptom or condition that you feel may need urgent attention  Your doctor recommends these additional instructions:  If any biopsies were taken, your doctors clinic will contact you in 1 to 2   weeks with any results.  - Return patient to hospital [facility] for ongoing care.   - Advance diet as tolerated.   - Continue present medications.   - Use Protonix (pantoprazole) 40 mg PO BID for 8 weeks.   - Repeat upper endoscopy in 2 months to check healing. on next exam- do on   2nd floor with intubation for airway  For questions, problems or results please call your physician - Cain Ortega MD at Work:  [phone].  [facility], EMERGENCY ROOM PHONE NUMBER: [phone]  IF A COMPLICATION OR EMERGENCY SITUATION ARISES AND YOU ARE UNABLE TO REACH   YOUR PHYSICIAN - GO DIRECTLY TO THE EMERGENCY ROOM.  Cain Ortega MD  2/11/2022 10:16:29 AM  This report has been verified and signed electronically.  Dear patient,  As a result of recent federal legislation (The Federal Cures Act), you may   receive lab or pathology results from your procedure in your [facility]   account before your physician is able to contact you. Your physician or   their representative will relay the results to you with their   recommendations at their soonest availability.  Thank you,  PROVATION

## 2022-02-11 NOTE — SUBJECTIVE & OBJECTIVE
Review of Systems   Constitutional: Positive for appetite change. Negative for chills and fever.   HENT: Negative for congestion and rhinorrhea.    Eyes: Negative for discharge, redness and visual disturbance.   Respiratory: Negative for cough, shortness of breath and wheezing.    Cardiovascular: Negative for chest pain, palpitations and leg swelling.   Gastrointestinal: Positive for abdominal pain and nausea. Negative for abdominal distention, constipation, diarrhea and vomiting.   Endocrine:        Thirst   Genitourinary: Negative for dysuria, frequency, hematuria and urgency.   Musculoskeletal: Negative for arthralgias, back pain and myalgias.   Skin: Negative for rash.   Neurological: Negative for tremors, light-headedness, numbness and headaches.   Psychiatric/Behavioral: Negative for confusion and hallucinations.     Objective:     Vital Signs (Most Recent):  Temp: 97.6 °F (36.4 °C) (02/11/22 1711)  Pulse: 82 (02/11/22 1711)  Resp: 20 (02/11/22 1711)  BP: 131/63 (02/11/22 1711)  SpO2: 95 % (02/11/22 1711) Vital Signs (24h Range):  Temp:  [97.2 °F (36.2 °C)-99.6 °F (37.6 °C)] 97.6 °F (36.4 °C)  Pulse:  [] 82  Resp:  [14-20] 20  SpO2:  [92 %-99 %] 95 %  BP: (116-153)/(63-96) 131/63     Weight: 64 kg (141 lb)  Body mass index is 21.44 kg/m².  No intake or output data in the 24 hours ending 02/11/22 1726   Physical Exam  Constitutional:       General: He is not in acute distress.     Appearance: Normal appearance. He is not toxic-appearing.   HENT:      Head: Normocephalic and atraumatic.      Nose: No congestion or rhinorrhea.      Mouth/Throat:      Mouth: Mucous membranes are dry.      Pharynx: Oropharynx is clear.   Eyes:      General: No scleral icterus.     Conjunctiva/sclera: Conjunctivae normal.      Pupils: Pupils are equal, round, and reactive to light.   Neck:      Comments: No JVD  No palpable neck masses, no thyroidmegaly   Cardiovascular:      Rate and Rhythm: Normal rate and regular rhythm.       Pulses: Normal pulses.      Heart sounds: No murmur heard.      Pulmonary:      Effort: Pulmonary effort is normal. No respiratory distress.      Breath sounds: Normal breath sounds. No wheezing or rales.      Comments: On 2L NC  Abdominal:      General: Abdomen is flat. Bowel sounds are normal. There is no distension.      Palpations: Abdomen is soft.      Tenderness: There is abdominal tenderness (epigastric). There is no right CVA tenderness, left CVA tenderness or guarding.   Musculoskeletal:         General: Normal range of motion.      Cervical back: Normal range of motion and neck supple.      Right lower leg: No edema.      Left lower leg: No edema.   Skin:     General: Skin is warm and dry.   Neurological:      General: No focal deficit present.      Mental Status: He is alert and oriented to person, place, and time. Mental status is at baseline.      Sensory: No sensory deficit.      Motor: No weakness.   Psychiatric:         Behavior: Behavior normal.         Thought Content: Thought content normal.         Significant Labs:   All pertinent labs within the past 24 hours have been reviewed.  CBC:   Recent Labs   Lab 02/10/22  0345 02/11/22  0351   WBC 8.25 6.27   HGB 12.4* 11.7*   HCT 38.2* 37.6*    147*     CMP:   Recent Labs   Lab 02/09/22  1749 02/10/22  0812 02/11/22  0351   * 131* 136   K 3.7 3.7 3.9   CL 82* 87* 93*   CO2 37* 38* 32*   GLU 98 88 78   BUN 21* 16 13   CREATININE 1.0 0.9 0.8   CALCIUM 10.4 9.7 9.2   PROT 5.8* 5.7* 5.6*   ALBUMIN 2.8* 2.7* 2.5*   BILITOT 1.1* 1.2* 0.7   ALKPHOS 65 61 59   AST 34 27 27   ALT 65* 52* 42   ANIONGAP 9 6* 11   EGFRNONAA >60.0 >60.0 >60.0       Significant Imaging: I have reviewed all pertinent imaging results/findings within the past 24 hours.

## 2022-02-12 VITALS
OXYGEN SATURATION: 91 % | RESPIRATION RATE: 21 BRPM | HEART RATE: 80 BPM | DIASTOLIC BLOOD PRESSURE: 69 MMHG | SYSTOLIC BLOOD PRESSURE: 102 MMHG | WEIGHT: 141 LBS | BODY MASS INDEX: 21.37 KG/M2 | HEIGHT: 68 IN | TEMPERATURE: 98 F

## 2022-02-12 LAB
BASOPHILS # BLD AUTO: 0.01 K/UL (ref 0–0.2)
BASOPHILS NFR BLD: 0.1 % (ref 0–1.9)
DIFFERENTIAL METHOD: ABNORMAL
EOSINOPHIL # BLD AUTO: 0.2 K/UL (ref 0–0.5)
EOSINOPHIL NFR BLD: 2.7 % (ref 0–8)
ERYTHROCYTE [DISTWIDTH] IN BLOOD BY AUTOMATED COUNT: 18.4 % (ref 11.5–14.5)
HCT VFR BLD AUTO: 35.8 % (ref 40–54)
HGB BLD-MCNC: 11.3 G/DL (ref 14–18)
IMM GRANULOCYTES # BLD AUTO: 0.04 K/UL (ref 0–0.04)
IMM GRANULOCYTES NFR BLD AUTO: 0.5 % (ref 0–0.5)
LYMPHOCYTES # BLD AUTO: 1.5 K/UL (ref 1–4.8)
LYMPHOCYTES NFR BLD: 21 % (ref 18–48)
MAGNESIUM SERPL-MCNC: 1.5 MG/DL (ref 1.6–2.6)
MCH RBC QN AUTO: 25.8 PG (ref 27–31)
MCHC RBC AUTO-ENTMCNC: 31.6 G/DL (ref 32–36)
MCV RBC AUTO: 82 FL (ref 82–98)
MONOCYTES # BLD AUTO: 1 K/UL (ref 0.3–1)
MONOCYTES NFR BLD: 13.6 % (ref 4–15)
NEUTROPHILS # BLD AUTO: 4.6 K/UL (ref 1.8–7.7)
NEUTROPHILS NFR BLD: 62.1 % (ref 38–73)
NRBC BLD-RTO: 0 /100 WBC
PHOSPHATE SERPL-MCNC: 1.8 MG/DL (ref 2.7–4.5)
PLATELET # BLD AUTO: 156 K/UL (ref 150–450)
PMV BLD AUTO: 10.9 FL (ref 9.2–12.9)
RBC # BLD AUTO: 4.38 M/UL (ref 4.6–6.2)
WBC # BLD AUTO: 7.35 K/UL (ref 3.9–12.7)

## 2022-02-12 PROCEDURE — S4991 NICOTINE PATCH NONLEGEND: HCPCS | Performed by: STUDENT IN AN ORGANIZED HEALTH CARE EDUCATION/TRAINING PROGRAM

## 2022-02-12 PROCEDURE — 85025 COMPLETE CBC W/AUTO DIFF WBC: CPT | Performed by: STUDENT IN AN ORGANIZED HEALTH CARE EDUCATION/TRAINING PROGRAM

## 2022-02-12 PROCEDURE — 94761 N-INVAS EAR/PLS OXIMETRY MLT: CPT

## 2022-02-12 PROCEDURE — 25000003 PHARM REV CODE 250: Performed by: STUDENT IN AN ORGANIZED HEALTH CARE EDUCATION/TRAINING PROGRAM

## 2022-02-12 PROCEDURE — 84100 ASSAY OF PHOSPHORUS: CPT | Performed by: STUDENT IN AN ORGANIZED HEALTH CARE EDUCATION/TRAINING PROGRAM

## 2022-02-12 PROCEDURE — 83735 ASSAY OF MAGNESIUM: CPT | Performed by: STUDENT IN AN ORGANIZED HEALTH CARE EDUCATION/TRAINING PROGRAM

## 2022-02-12 PROCEDURE — 27000221 HC OXYGEN, UP TO 24 HOURS

## 2022-02-12 PROCEDURE — 94640 AIRWAY INHALATION TREATMENT: CPT

## 2022-02-12 PROCEDURE — 36415 COLL VENOUS BLD VENIPUNCTURE: CPT | Performed by: STUDENT IN AN ORGANIZED HEALTH CARE EDUCATION/TRAINING PROGRAM

## 2022-02-12 RX ADMIN — TIOTROPIUM BROMIDE INHALATION SPRAY 2 PUFF: 3.12 SPRAY, METERED RESPIRATORY (INHALATION) at 08:02

## 2022-02-12 RX ADMIN — NICOTINE 1 PATCH: 7 PATCH, EXTENDED RELEASE TRANSDERMAL at 08:02

## 2022-02-12 RX ADMIN — Medication 100 MG: at 08:02

## 2022-02-12 RX ADMIN — AMLODIPINE BESYLATE 10 MG: 10 TABLET ORAL at 08:02

## 2022-02-12 RX ADMIN — ATORVASTATIN CALCIUM 40 MG: 20 TABLET, FILM COATED ORAL at 08:02

## 2022-02-12 RX ADMIN — CARVEDILOL 12.5 MG: 12.5 TABLET, FILM COATED ORAL at 08:02

## 2022-02-12 RX ADMIN — ALBUTEROL SULFATE 1 PUFF: 108 INHALANT RESPIRATORY (INHALATION) at 08:02

## 2022-02-12 RX ADMIN — FOLIC ACID 1 MG: 1 TABLET ORAL at 08:02

## 2022-02-12 RX ADMIN — PANTOPRAZOLE SODIUM 40 MG: 40 TABLET, DELAYED RELEASE ORAL at 08:02

## 2022-02-12 RX ADMIN — ASPIRIN 81 MG: 81 TABLET, COATED ORAL at 08:02

## 2022-02-12 NOTE — PLAN OF CARE
CM contacted [facility] ( ext [phone]) for update on wheelchair van request.  [facility] has not given an ETA at this time.   ETA may be several more hours when assigned.

## 2022-02-12 NOTE — PLAN OF CARE
Patient was to be discharged last night but there was no wheelchair van transport available. RN was given two options the patient can stay the night for transport the next day or an ambulance can transport him home. Dr Michaels was notified and she stated he can stay the night he does not qualify for ambulance transport. The patient was informed and discharge discontinued for the moment. Pt was continued to be monitored.     VS stable. Ambulates independently to the bathroom. No complaints during the night. Pt has remained free from injury this shift. Bed in low locked position. Call light and personal belongings within reach. Side rails up x2. Nonskid socks in place. Pt instructed to call with any needs. Will continue to monitor.

## 2022-02-12 NOTE — ASSESSMENT & PLAN NOTE
Mr. Mccray is a 61-year-old-man with HTN, HLD, COPD (on home 2L), tobacco abuse, seizure disorders (no longer on anti-epileptics), HFpEF (55%), who presents with post-prandial epigastric abdominal pain. Seen day prior in ED for similar complaint and requested discharge after he tolerated PO s/p phenergan.     In ED, afebrile, HR 93, 164/98, stating well on home 2L w/o tachypnea. WBC 12. Hg 16. (bl 12.9), MCV 79. cCa 11.5 (high). Ionized Ca 1.46 (high). Cr 0.9 (bl). Bicarb 41 (at baseline). Trop 0.071. EKG NSR 90, QTc wnl, no change from prior. CT A/P negative for acute abdominopelvic abnormalities to explain epigastric pain. RUQ US with 2 small gal bladder polyps and no cholecystitis. S/p 500 NS x2 in ED and calcitonin trial. PTH, PTHrp, and MM w/u pending.     Symptomatic moderate hypercalcemia   Suspect dehydration given concentrated Hg and U sp gravity 1.030  R/o hyperparathyroidism, r/o elevated PTHrp, r/o MM    - s/p  NS in ED  - continue IV fluid bolus as need, trend Ca, CMP BID  - cautious with continuous fluids given diastolic dysfunction  - calcitonin trial in ED, if cCa improving can schedule calcitonin q8 hrs until Ca normalizes   - if Ca continues elevated, consider bisphosphonate, zoledronic acid   - if PTH mediated, f/u with endocrine outpatient for further hyperparathyroidism workup   - Calcium improving, PTH not elevated, SPEP not elevated

## 2022-02-12 NOTE — SUBJECTIVE & OBJECTIVE
Interval History: Patient underwent EGD today showing large gastric ulcer. Prescribed PPI BID and to undergo repeat EGD in 8 weeks.     Review of Systems   Constitutional: Positive for appetite change. Negative for chills and fever.   HENT: Negative for congestion and rhinorrhea.    Eyes: Negative for discharge, redness and visual disturbance.   Respiratory: Negative for cough, shortness of breath and wheezing.    Cardiovascular: Negative for chest pain, palpitations and leg swelling.   Gastrointestinal: Positive for abdominal pain and nausea. Negative for abdominal distention, constipation, diarrhea and vomiting.   Endocrine:        Thirst   Genitourinary: Negative for dysuria, frequency, hematuria and urgency.   Musculoskeletal: Negative for arthralgias, back pain and myalgias.   Skin: Negative for rash.   Neurological: Negative for tremors, light-headedness, numbness and headaches.   Psychiatric/Behavioral: Negative for confusion and hallucinations.     Objective:     Vital Signs (Most Recent):  Temp: 98.1 °F (36.7 °C) (02/12/22 1107)  Pulse: 80 (02/12/22 1107)  Resp: (!) 21 (02/12/22 1107)  BP: 102/69 (02/12/22 1107)  SpO2: (!) 91 % (02/12/22 1107) Vital Signs (24h Range):  Temp:  [97.6 °F (36.4 °C)-100.6 °F (38.1 °C)] 98.1 °F (36.7 °C)  Pulse:  [79-90] 80  Resp:  [16-21] 21  SpO2:  [90 %-100 %] 91 %  BP: (102-139)/(63-74) 102/69     Weight: 64 kg (141 lb)  Body mass index is 21.44 kg/m².  No intake or output data in the 24 hours ending 02/12/22 1426   Physical Exam  Constitutional:       General: He is not in acute distress.     Appearance: Normal appearance. He is not toxic-appearing.   HENT:      Head: Normocephalic and atraumatic.      Nose: No congestion or rhinorrhea.      Mouth/Throat:      Mouth: Mucous membranes are dry.      Pharynx: Oropharynx is clear.   Eyes:      General: No scleral icterus.     Conjunctiva/sclera: Conjunctivae normal.      Pupils: Pupils are equal, round, and reactive to light.    Neck:      Comments: No JVD  No palpable neck masses, no thyroidmegaly   Cardiovascular:      Rate and Rhythm: Normal rate and regular rhythm.      Pulses: Normal pulses.      Heart sounds: No murmur heard.      Pulmonary:      Effort: Pulmonary effort is normal. No respiratory distress.      Breath sounds: Normal breath sounds. No wheezing or rales.      Comments: On 2L NC  Abdominal:      General: Abdomen is flat. Bowel sounds are normal. There is no distension.      Palpations: Abdomen is soft.      Tenderness: There is abdominal tenderness (epigastric). There is no right CVA tenderness, left CVA tenderness or guarding.   Musculoskeletal:         General: Normal range of motion.      Cervical back: Normal range of motion and neck supple.      Right lower leg: No edema.      Left lower leg: No edema.   Skin:     General: Skin is warm and dry.   Neurological:      General: No focal deficit present.      Mental Status: He is alert and oriented to person, place, and time. Mental status is at baseline.      Sensory: No sensory deficit.      Motor: No weakness.   Psychiatric:         Behavior: Behavior normal.         Thought Content: Thought content normal.         Significant Labs:   All pertinent labs within the past 24 hours have been reviewed.  CBC:   Recent Labs   Lab 02/11/22  0351 02/12/22  0302   WBC 6.27 7.35   HGB 11.7* 11.3*   HCT 37.6* 35.8*   * 156     CMP:   Recent Labs   Lab 02/11/22  0351      K 3.9   CL 93*   CO2 32*   GLU 78   BUN 13   CREATININE 0.8   CALCIUM 9.2   PROT 5.6*   ALBUMIN 2.5*   BILITOT 0.7   ALKPHOS 59   AST 27   ALT 42   ANIONGAP 11   EGFRNONAA >60.0       Significant Imaging: I have reviewed all pertinent imaging results/findings within the past 24 hours.

## 2022-02-12 NOTE — PROGRESS NOTES
[facility] - Oncology ([facility])  Hospital Medicine  Progress Note    Patient Name: Baltazar Mccray  MRN: [id number]  Patient Class: IP- Inpatient   Admission Date: 2/9/2022  Length of Stay: 3 days  Attending Physician: Jose Juan Castle MD  Primary Care Provider: Jaden        Subjective:     Principal Problem:Hypercalcemia        HPI:  Mr. Mccray is a 61-year-old-man with HTN, HLD, COPD (on home 2L), tobacco abuse, seizure disorders (no longer on anti-epileptics), HFpEF (55%), who presents with post-prandial epigastric abdominal pain. Seen day prior in ED for similar complaint and requested discharge after he tolerated PO s/p phenergan.     Started 2 days ago. Intermittent and worse after eating. Last about 10 minutes, 8/10 in severity. Does not radiate. Associated with loss of appetite, nausea and increased thirst. No emesis/hematemasis. Having daily brown bowel movements. No SOB or CP. No fever or chills. No bone pain. No confusion or hallucinations.No dysuria. No weight gain or loss. No night sweats. Has not happened before. No family hx of hyperparathyroidism or MEN. Lives alone with cat. Working on quitting smoking, down to 1pp/week. Drinks 1-3 beers about 1x/week.     In ED, afebrile, HR 93, 164/98, stating well on home 2L w/o tachypnea. WBC 12. Hg 16. (bl 12.9), MCV 79. cCa 11.5 (high). Ionized Ca 1.46 (high). Cr 0.9 (bl). Bicarb 41 (at baseline). Trop 0.071. EKG NSR 90, QTc wnl, no change from prior. CT A/P negative for acute abdominopelvic abnormalities to explain epigastric pain. RUQ US with 2 small gal bladder polyps and no cholecystitis. S/p 500 NS x2 in ED and calcitonin trial. PTH, PTHrp, and MM w/u pending.       Overview/Hospital Course:  Patient admitted to hospital medicine for abdominal pain and hypercalcemia. Work up of hypercalcemia has been unremarkable thus far. Concern for possible peptic ulcer with patient's history of excessive TUMS use. 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 EGD  finds large gastric ulcer. Discharged with PPI BID, repeat EGD in 8 weeks to assess for healing, and GI follow up. Patient advise to avoid TUMS.      Interval History: Patient underwent EGD today showing large gastric ulcer. Prescribed PPI BID and to undergo repeat EGD in 8 weeks.     Review of Systems   Constitutional: Positive for appetite change. Negative for chills and fever.   HENT: Negative for congestion and rhinorrhea.    Eyes: Negative for discharge, redness and visual disturbance.   Respiratory: Negative for cough, shortness of breath and wheezing.    Cardiovascular: Negative for chest pain, palpitations and leg swelling.   Gastrointestinal: Positive for abdominal pain and nausea. Negative for abdominal distention, constipation, diarrhea and vomiting.   Endocrine:        Thirst   Genitourinary: Negative for dysuria, frequency, hematuria and urgency.   Musculoskeletal: Negative for arthralgias, back pain and myalgias.   Skin: Negative for rash.   Neurological: Negative for tremors, light-headedness, numbness and headaches.   Psychiatric/Behavioral: Negative for confusion and hallucinations.     Objective:     Vital Signs (Most Recent):  Temp: 98.1 °F (36.7 °C) (02/12/22 1107)  Pulse: 80 (02/12/22 1107)  Resp: (!) 21 (02/12/22 1107)  BP: 102/69 (02/12/22 1107)  SpO2: (!) 91 % (02/12/22 1107) Vital Signs (24h Range):  Temp:  [97.6 °F (36.4 °C)-100.6 °F (38.1 °C)] 98.1 °F (36.7 °C)  Pulse:  [79-90] 80  Resp:  [16-21] 21  SpO2:  [90 %-100 %] 91 %  BP: (102-139)/(63-74) 102/69     Weight: 64 kg (141 lb)  Body mass index is 21.44 kg/m².  No intake or output data in the 24 hours ending 02/12/22 1426   Physical Exam  Constitutional:       General: He is not in acute distress.     Appearance: Normal appearance. He is not toxic-appearing.   HENT:      Head: Normocephalic and atraumatic.      Nose: No congestion or rhinorrhea.      Mouth/Throat:      Mouth: Mucous membranes are dry.      Pharynx: Oropharynx is clear.    Eyes:      General: No scleral icterus.     Conjunctiva/sclera: Conjunctivae normal.      Pupils: Pupils are equal, round, and reactive to light.   Neck:      Comments: No JVD  No palpable neck masses, no thyroidmegaly   Cardiovascular:      Rate and Rhythm: Normal rate and regular rhythm.      Pulses: Normal pulses.      Heart sounds: No murmur heard.      Pulmonary:      Effort: Pulmonary effort is normal. No respiratory distress.      Breath sounds: Normal breath sounds. No wheezing or rales.      Comments: On 2L NC  Abdominal:      General: Abdomen is flat. Bowel sounds are normal. There is no distension.      Palpations: Abdomen is soft.      Tenderness: There is abdominal tenderness (epigastric). There is no right CVA tenderness, left CVA tenderness or guarding.   Musculoskeletal:         General: Normal range of motion.      Cervical back: Normal range of motion and neck supple.      Right lower leg: No edema.      Left lower leg: No edema.   Skin:     General: Skin is warm and dry.   Neurological:      General: No focal deficit present.      Mental Status: He is alert and oriented to person, place, and time. Mental status is at baseline.      Sensory: No sensory deficit.      Motor: No weakness.   Psychiatric:         Behavior: Behavior normal.         Thought Content: Thought content normal.         Significant Labs:   All pertinent labs within the past 24 hours have been reviewed.  CBC:   Recent Labs   Lab 02/11/22  0351 02/12/22  0302   WBC 6.27 7.35   HGB 11.7* 11.3*   HCT 37.6* 35.8*   * 156     CMP:   Recent Labs   Lab 02/11/22  0351      K 3.9   CL 93*   CO2 32*   GLU 78   BUN 13   CREATININE 0.8   CALCIUM 9.2   PROT 5.6*   ALBUMIN 2.5*   BILITOT 0.7   ALKPHOS 59   AST 27   ALT 42   ANIONGAP 11   EGFRNONAA >60.0       Significant Imaging: I have reviewed all pertinent imaging results/findings within the past 24 hours.      Assessment/Plan:      * Hypercalcemia  Mr. Mccray is a  61-year-old-man with HTN, HLD, COPD (on home 2L), tobacco abuse, seizure disorders (no longer on anti-epileptics), HFpEF (55%), who presents with post-prandial epigastric abdominal pain. Seen day prior in ED for similar complaint and requested discharge after he tolerated PO s/p phenergan.     In ED, afebrile, HR 93, 164/98, stating well on home 2L w/o tachypnea. WBC 12. Hg 16. (bl 12.9), MCV 79. cCa 11.5 (high). Ionized Ca 1.46 (high). Cr 0.9 (bl). Bicarb 41 (at baseline). Trop 0.071. EKG NSR 90, QTc wnl, no change from prior. CT A/P negative for acute abdominopelvic abnormalities to explain epigastric pain. RUQ US with 2 small gal bladder polyps and no cholecystitis. S/p 500 NS x2 in ED and calcitonin trial. PTH, PTHrp, and MM w/u pending.     Symptomatic moderate hypercalcemia   Suspect dehydration given concentrated Hg and U sp gravity 1.030  R/o hyperparathyroidism, r/o elevated PTHrp, r/o MM    - s/p  NS in ED  - continue IV fluid bolus as need, trend Ca, CMP BID  - cautious with continuous fluids given diastolic dysfunction  - calcitonin trial in ED, if cCa improving can schedule calcitonin q8 hrs until Ca normalizes   - if Ca continues elevated, consider bisphosphonate, zoledronic acid   - if PTH mediated, f/u with endocrine outpatient for further hyperparathyroidism workup   - Calcium improving, PTH not elevated, SPEP not elevated    Epigastric pain  New onset epigastric pain. CT A/P unrevealing. Suspect 2/2 hypercalcemia.   S/p morphine 6 mg x2 and dilaudid in ED.   Pain resolving.     - PPI trial  - see hypercalcemia management   - prn tyelonol   - PO oxy prn  - EGD reveals large gastric ulcer. Will require PPI BID and follow up EGD to assess for healing.     Chronic diastolic heart failure  TTE 1/29/2022:   · The left ventricle is normal in size with normal systolic function.  · Mild left atrial enlargement.  · There is pulmonary hypertension.  · The estimated ejection fraction is 55%.  · Grade I  left ventricular diastolic dysfunction.  · Study quality was technically difficult  · Normal right ventricular size with normal right ventricular systolic function.  · Normal central venous pressure (3 mmHg).  · The estimated PA systolic pressure is 50 mmHg.  · Interatrial septal aneurysm  · Mild tricuspid regurgitation.    Not overloaded on exam. .     - monitor volume status   - continue home furosemide 20 mg qd    Chronic obstructive pulmonary disease  No SOB on home 2L.   Chronic elevated bicarb (41 on admission), suspected to be 2/2 respiratory hyperventilation from COPD    - continue prn albuterol   - continue Spriva  - consider BiPAP prn    Nonobstructive atherosclerosis of coronary artery  The 10-year ASCVD risk score (Lora HERMOSILLO JrGonzalo, et al., 2013) is: 19.6%    Values used to calculate the score:      Age: 61 years      Sex: Male      Is Non- : Yes      Diabetic: No      Tobacco smoker: Yes      Systolic Blood Pressure: 129 mmHg      Is BP treated: Yes      HDL Cholesterol: 91 mg/dL      Total Cholesterol: 260 mg/dL    - continue home statin  - trop 0.021 on admission, w/o EKG changes, f/u repeat trop     Tobacco abuse  Attempting to quit.   - nicotine patch   - cessation encouraged    Hx of seizure disorder  Stable. No recent seizures. Not on home meds.   Alcohol use 1-3 drinks 1x/week.   - watch for signs of alcohol WD    Essential hypertension  /98 on admission. Not on HCTZ.     - continue home coreg 12.5 mg BID  - amlodipine 10 mg qd      VTE Risk Mitigation (From admission, onward)         Ordered     enoxaparin injection 40 mg  Daily         02/09/22 0421     IP VTE HIGH RISK PATIENT  Once         02/09/22 0421     Place sequential compression device  Until discontinued         02/09/22 0421                Discharge Planning   RUTH: 2/12/2022     Code Status: Full Code   Is the patient medically ready for discharge?: Yes    Reason for patient still in hospital (select  all that apply): Patient trending condition  Discharge Plan A: Home              Hawk Gonzales MD   PGY-1  Department of Hospital Medicine   [facility] - Oncology ([facility])

## 2022-02-12 NOTE — ASSESSMENT & PLAN NOTE
New onset epigastric pain. CT A/P unrevealing. Suspect 2/2 hypercalcemia.   S/p morphine 6 mg x2 and dilaudid in ED.   Pain resolving.     - PPI trial  - see hypercalcemia management   - prn tyelonol   - PO oxy prn  - EGD reveals large gastric ulcer. Will require PPI BID and follow up EGD to assess for healing.

## 2022-02-13 NOTE — DISCHARGE SUMMARY
Discharge Summary  Hospital Medicine    Attending Provider on Discharge: Radha Mason MD  [facility] Medicine Team: [facility]  Date of Admission:  1/28/2022     Date of Discharge:  1/30/2022  Code status: Full Code    Active Hospital Problems    Diagnosis  POA    *Acute on chronic respiratory failure with hypoxia and hypercapnia [J96.21, J96.22]  Yes    Elevated liver enzymes [R74.8]  Unknown    Atrial fibrillation with rapid ventricular response [I48.91]  Unknown    Poor social situation [Z65.9]  Not Applicable    Alcohol use disorder, mild, abuse [F10.10]  Yes     Chronic    Chronic diastolic heart failure [I50.32]  Yes    Alcohol abuse [F10.10]  Yes    Chronic obstructive pulmonary disease [J44.9]  Yes     Chronic     Per my interpretation spirometry shows significant obstruction-FEV1 28% of predicted, moderate restriction and significantly decreased DLCO.     MMRC 3-4 symptoms of dyspnea-has home oxygen  Greater than 10 COPD exacerbations within 1 year;  Had recent exacerbation few weeks ago.  Prescribe prednisone and antibiotics seems to be better.  Concerns for noncompliance with inhaler regimen  Approximately to hospitalizations for COPD within the last 1 year  Notes to have a chronic cough  Significant smoking history  History of crack cocaine use quit 20 years  History of working at  [employer] -in the 90s exposed to black sand  Now reporting better living arrangements at Nursing home.       Has failed previous inhaler therapy (advair and Spiriva)   Also, discussed pulmonary rehabilitation   May be considered for advanced lung follow-up- he patient is 61 years old.    Explains he needs to remain smoke free for approximately 6 months    -Encouraged complete smoking cessation. I have referred you to the smoking cessation program.   -Start Breztri. Rinse mouth after use.   -Referal to pulm rehab placed- patient interested.   -Continue albuterol 2 puffs every 4-6 hours as needed for shortness of  breath or wheezing.   - Please obtain CT of chest in relation to previous abnormal CT - for follow up. Would recommend annual low dose CT screening in relation to your smoking history.   -Please make sure pneumonia vaccine is up to date. Thinks could have obtained the pneumonia vaccine. Would like to follow up with daughters of [facility] and [employer].   -Encourage COVID vaccine.   -Continue home oxygen as prescribed.   -Follow up in 8 weeks or sooner if needed.       Nonobstructive atherosclerosis of coronary artery [I25.10]  Yes     Chronic    Chronic combined systolic and diastolic congestive heart failure [I50.42]  Yes    Tobacco abuse [Z72.0]  Yes     Chronic    Elevated troponin [R77.8]  Yes    Chronic respiratory failure [J96.10]  Yes    Hx of seizure disorder [Z86.69]  Not Applicable     Chronic    Essential hypertension [I10]  Yes     Chronic    Lung nodule [R91.1]  Yes    COPD exacerbation [J44.1]  Yes      Resolved Hospital Problems   No resolved problems to display.     HPI  Mr. Mccray is a 60 yo M with HTN, COPD on home 2 L NC, current tobacco abuse, Hx of seizure disorder,  chronic diastolic heart failure, mild alcohol use disorder presents for worsening shortness of breath for 1 week.  Per patient, he has been increasing his home oxygen to 5-6 L NC. Also have had multiple admission, ED visits for COPD exacerbation in the past. Also admits that he have not been taking his inhaler, and BP medications as instructed. Associated symptoms of subjective fever, night sweat, and dry cough. Recently, went to an urgent care for similar symptoms and was given prednisone 20 mg which is taking but it is not helping. Unaware of hx of HF but does exert orthopnea, and REES, and weight gain. Denies lower extremities swelling. Pt denies chest pain, sore throat, abdominal pain, n/v/d, constipation, fever, chills, headache, numbness or tingling or weakness of the extremities.      In the ED, /127,  afib  with rvr, RR 22, sat 99% on a non-rebreather mask. Labs significant for BNP 1208, troponin 0.169, VBG with pH 7.346, pCO2 72.9, pO2 87. CTA with no acute intrathoracic findings identified, appearance of the lungs without substantial interval change when compared to most recent CTA chest performed 12/21/2021.  Redemonstrated emphysema with areas of architectural distortion nonspecific ground-glass attenuation, stable appearance of 12-13 mm nodule in the left lower lobe. Pt was placed on BiPAP, started on doxycycline, duoneb, diltiazem, lasix 40 mg. Pt is admitted to MICU for higher level care.      Hospital Course  Admitted to MICU for COPD exacerbation. VBG improved on BiPap. Weaned down to 3L NC. LFTs elevated, hep panel negative, liver U/S showing steatosis and small gallstone. Repeat TTE ordered. New onset Afib rate controlled with diltiazem pushes. Stable for stepdown on 1/29. He then was weaned off oxygen and discharged with a course of levaquin and continued on prednisone taper to end at 10 mg daily which will be tapered as outaptient    Procedures: none    Consultants:   none    Discharge Medication List as of 1/30/2022  4:29 PM      CONTINUE these medications which have CHANGED    Details   carvediloL (COREG) 12.5 MG tablet Take 1 tablet (12.5 mg total) by mouth 2 (two) times daily with meals., Starting Sun 1/30/2022, Until Mon 1/30/2023, Normal      furosemide (LASIX) 20 MG tablet Take 1 tablet (20 mg total) by mouth once daily., Starting Sun 1/30/2022, Until Mon 1/30/2023, Normal      levoFLOXacin (LEVAQUIN) 750 MG tablet Take 1 tablet (750 mg total) by mouth once daily. for 2 days, Starting Mon 1/31/2022, Until Wed 2/2/2022, Normal      predniSONE (DELTASONE) 10 MG tablet By mouth take 4 tablets (40 mg) for 5 days then 2 tablet (20 mg) for 5 days then take 1 tablet for 5 days, then take half tablet a day, Normal         CONTINUE these medications which have NOT CHANGED    Details   acetaminophen (TYLENOL)  325 MG tablet Take 2 tablets (650 mg total) by mouth every 8 (eight) hours as needed for Pain or Temperature greater than (100.5)., Starting Thu 6/10/2021, No Print      albuterol-ipratropium (DUO-NEB) 2.5 mg-0.5 mg/3 mL nebulizer solution Take 3 mLs by nebulization every 4 (four) hours as needed for Wheezing or Shortness of Breath. Rescue, Starting Sun 1/16/2022, Until Mon 1/16/2023 at 2359, Normal      amLODIPine (NORVASC) 10 MG tablet Take 1 tablet (10 mg total) by mouth once daily., Starting Fri 6/11/2021, No Print      aspirin (ECOTRIN) 81 MG EC tablet Take 1 tablet (81 mg total) by mouth once daily., Starting Thu 5/13/2021, Normal      atorvastatin (LIPITOR) 40 MG tablet Take 1 tablet (40 mg total) by mouth once daily., Starting Thu 5/13/2021, Until Fri 5/13/2022, Normal      budesonide-glycopyr-formoterol (BREZTRI AEROSPHERE) 160-9-4.8 mcg/actuation HFAA Inhale 2 puffs into the lungs 2 (two) times daily. Rinse mouth after use., Starting Sun 1/16/2022, Normal      folic acid (FOLVITE) 1 MG tablet Take 1 tablet (1 mg total) by mouth once daily., Starting Tue 6/1/2021, Until Wed 6/1/2022, Normal      multivitamin Tab Take 1 tablet by mouth once daily., Starting Tue 6/1/2021, Normal      nicotine (NICODERM CQ) 7 mg/24 hr Place 1 patch onto the skin once daily., Starting Tue 6/1/2021, Normal      senna (SENOKOT) 8.6 mg tablet Take 1 tablet by mouth daily as needed for Constipation., Starting Tue 3/17/2020, OTC      thiamine 100 MG tablet Take 1 tablet (100 mg total) by mouth once daily., Starting Tue 6/1/2021, Normal         STOP taking these medications       pulse oximeter (PULSE OXIMETER) device Comments:   Reason for Stopping:               Discharge Diet:cardiac diet     Activity: activity as tolerated    Discharge Condition: Good    Disposition: Admitted as an Inpatient    Tests pending at the time of discharge: none      Time spent  on the discharge of the patient including review of hospital course with  the patient. reviewing discharge medications and arranging follow-up care 35 min    Discharge examination Patient was seen and examined on the date of discharge and determined to be suitable for discharge.    Discharge plan     Future Appointments   Date Time Provider Department Center   2/23/2022  8:00 AM Susan Rae, NP [address]       Radha Mason MD

## 2022-02-14 ENCOUNTER — PATIENT OUTREACH (OUTPATIENT)
Dept: ADMINISTRATIVE | Facility: CLINIC | Age: 62
End: 2022-02-14
Payer: MEDICAID

## 2022-02-14 ENCOUNTER — TELEPHONE (OUTPATIENT)
Dept: ENDOSCOPY | Facility: HOSPITAL | Age: 62
End: 2022-02-14
Payer: MEDICAID

## 2022-02-14 DIAGNOSIS — E83.52 HYPERCALCEMIA: Primary | ICD-10-CM

## 2022-02-14 NOTE — ANESTHESIA POSTPROCEDURE EVALUATION
Anesthesia Post Evaluation    Patient: Baltazar Mccray    Procedure(s) Performed: Procedure(s) (LRB):  EGD (ESOPHAGOGASTRODUODENOSCOPY) (N/A)    Final Anesthesia Type: general      Patient location during evaluation: PACU  Patient participation: Yes- Able to Participate  Level of consciousness: awake and alert  Post-procedure vital signs: reviewed and stable  Pain control: Pain has been treated.  Airway patency: patent    PONV status: PONV absent or treated.  Anesthetic complications: no      Cardiovascular status: hemodynamically stable  Respiratory status: spontaneous ventilation  Hydration status: euvolemic  Follow-up not needed.          Vitals Value Taken Time   /97 02/11/22 1047   Temp 36.8 °C (98.2 °F) 02/11/22 1020   Pulse 78 02/11/22 1055   Resp 33 02/11/22 1055   SpO2 98 % 02/11/22 1055   Vitals shown include unvalidated device data.      Event Time   Out of Recovery 02/11/2022 10:30:00         Pain/Aman Score: No data recorded

## 2022-02-14 NOTE — PATIENT INSTRUCTIONS
Patient Education       Hypercalcemia Discharge Instructions   About this topic   A high level of calcium in the blood is hypercalcemia. Calcium is an important mineral that is in almost every part of your body. It makes your teeth and bones strong and healthy. It also helps your body:  · Make blood clots  · Keep your heartbeat normal  · Release hormones  · Send and get signals between your nerves and brain  · Make muscles work well  Certain health problems can keep the calcium from being absorbed out of the blood. This can make your blood have a higher level of calcium. These are problems like:  · Trouble with your thyroid, parathyroid, or adrenal glands  · Certain drugs or diet aids  · Kidney failure or cancer  · Infections or inflammatory diseases  · Being confined to bed for a long time  People who have high blood calcium may have some signs or they may have none at all. You may notice:  · Muscle weakness and twitching  · Being very tired  · Not able to have bowel movements  · Low mood  · Upset stomach and throwing up  Your doctor will work to treat any hidden cause. You may need to have fluids, be given special drugs, or dialysis to help get rid of the calcium in your blood. You may be in the hospital until your levels are back to normal.  What care is needed at home?   · Keep track of your fluid intake. Drink 8 to 10 glasses of fluids each day.  · Avoid antacids that have high calcium content.  · Your doctor may give you drugs to control the calcium in your body. Take them as ordered.  · Your doctor will talk to you about other care needed for the cause of your hypercalcemia. Follow your doctor's orders with care.  What follow-up care is needed?   Your doctor may ask you to make visits to the office to check on your progress. Be sure to keep these visits. You may need another test to check the calcium level in your blood.  What drugs may be needed?   The doctor may order drugs to:  · Slow bone loss  · Replace  lost fluids  · Prevent the effects of too much vitamin D in your blood  Will physical activity be limited?   Your activities will not be limited.  What changes to diet are needed?   Talk to your doctor or dietitian about your personal diet plan. Ask if you need to limit certain foods like milk and milk products.  What problems could happen?   · Osteoporosis  · Bone fractures or cysts  · Kidney stones or kidney failure  · High blood pressure  · Stomach ulcers  · Pancreatitis  · Too much fluid loss  · Low mood  · Forgetfulness  · Low level of consciousness  · Coma  When do I need to call the doctor?   · Being very tired  · Loss of appetite  · Problem when passing urine or blood in urine  · Throwing up or loose stools  · Too much thirst  · Irregular heartbeat  · Chest pain  · Lightheadedness  · Low mood  · Confusion  Teach Back: Helping You Understand   The Teach Back Method helps you understand the information we are giving you. After you talk with the staff, tell them in your own words what you learned. This helps to make sure the staff has described each thing clearly. It also helps to explain things that may have been confusing. Before going home, make sure you can do these:  · I can tell you about my condition.  · I can tell you what changes I need to make with my diet or drugs.  · I can tell you what I will do if I am very tired or have chest pain or an irregular heartbeat.  Last Reviewed Date   2019-10-21  Consumer Information Use and Disclaimer   This information is not specific medical advice and does not replace information you receive from your health care provider. This is only a brief summary of general information. It does NOT include all information about conditions, illnesses, injuries, tests, procedures, treatments, therapies, discharge instructions or life-style choices that may apply to you. You must talk with your health care provider for complete information about your health and treatment options.  This information should not be used to decide whether or not to accept your health care providers advice, instructions or recommendations. Only your health care provider has the knowledge and training to provide advice that is right for you.  Copyright   Copyright © 2021 [employer] and its affiliates and/or licensors. All rights reserved.  Reanna teaching reviewed with Baltazar Mccray  . Baltazar Mccray  verbalized understanding.    Education was provided based on the patient's discharge diagnosis using the attached Reanna patient education as a reference.

## 2022-02-14 NOTE — TELEPHONE ENCOUNTER
Patient has an order for an EGD.  Is patient cleared from Pulmonology standpoint to have procedure?  Please advise.  Thank you.    Keiry        
no

## 2022-02-14 NOTE — PROGRESS NOTES
C3 nurse spoke with Baltazar Mccray  for a TCC post hospital discharge follow up call. The patient does not have a scheduled HOSFU appointment with Rhonda Amarilys Montalvo   within 7-14 days post hospital discharge date 02/12/2022 C3 nurse was unable to schedule HOSFU appointment in [facility].

## 2022-02-17 ENCOUNTER — TELEPHONE (OUTPATIENT)
Dept: CASE MANAGEMENT | Facility: HOSPITAL | Age: 62
End: 2022-02-17
Payer: MEDICAID

## 2022-02-17 ENCOUNTER — TELEPHONE (OUTPATIENT)
Dept: GASTROENTEROLOGY | Facility: CLINIC | Age: 62
End: 2022-02-17
Payer: MEDICAID

## 2022-02-17 LAB
FINAL PATHOLOGIC DIAGNOSIS: NORMAL
GROSS: NORMAL
Lab: NORMAL

## 2022-02-17 NOTE — TELEPHONE ENCOUNTER
Contacted patient reminding him of his Care at Home appt next Wed 2/23 @ 8am. Also provided info on Pulm appt 5/18 @ 12N w/ Dr Byrd (pt placed on wait list as well). Patient receptive of info and had no other concerns for CM.    Fernanda Gibbons, BSN, RN, [facility]  Care Transition Navigator  [phone] or [phone]

## 2022-02-17 NOTE — TELEPHONE ENCOUNTER
----- Message from Cain Ortega MD sent at 2/17/2022 11:53 AM CST -----  This patient needs a follow-up in GI Clinic, ideally in a [facility] since this is a hospital follow-up  Dr. Parisi

## 2022-02-18 ENCOUNTER — PES CALL (OUTPATIENT)
Dept: HOME HEALTH SERVICES | Facility: CLINIC | Age: 62
End: 2022-02-18
Payer: MEDICAID

## 2022-02-21 ENCOUNTER — HOSPITAL ENCOUNTER (EMERGENCY)
Facility: HOSPITAL | Age: 62
Discharge: HOME | End: 2022-02-21
Attending: EMERGENCY MEDICINE
Payer: MEDICAID

## 2022-02-21 VITALS
TEMPERATURE: 98 F | HEART RATE: 107 BPM | OXYGEN SATURATION: 95 % | RESPIRATION RATE: 41 BRPM | DIASTOLIC BLOOD PRESSURE: 107 MMHG | SYSTOLIC BLOOD PRESSURE: 170 MMHG

## 2022-02-21 DIAGNOSIS — J96.01 ACUTE RESPIRATORY FAILURE WITH HYPOXIA AND HYPERCAPNIA: ICD-10-CM

## 2022-02-21 DIAGNOSIS — J44.9 CHRONIC OBSTRUCTIVE PULMONARY DISEASE, UNSPECIFIED COPD TYPE: ICD-10-CM

## 2022-02-21 DIAGNOSIS — R00.0 TACHYCARDIA: ICD-10-CM

## 2022-02-21 DIAGNOSIS — J96.02 ACUTE RESPIRATORY FAILURE WITH HYPOXIA AND HYPERCAPNIA: ICD-10-CM

## 2022-02-21 DIAGNOSIS — J44.1 COPD EXACERBATION: Primary | ICD-10-CM

## 2022-02-21 DIAGNOSIS — J96.01 ACUTE HYPOXEMIC RESPIRATORY FAILURE: ICD-10-CM

## 2022-02-21 LAB
ALBUMIN SERPL BCP-MCNC: 2.4 G/DL (ref 3.5–5.2)
ALLENS TEST: ABNORMAL
ALLENS TEST: ABNORMAL
ALP SERPL-CCNC: 55 U/L (ref 55–135)
ALT SERPL W/O P-5'-P-CCNC: 49 U/L (ref 10–44)
ANION GAP SERPL CALC-SCNC: 7 MMOL/L (ref 8–16)
AST SERPL-CCNC: 63 U/L (ref 10–40)
BASOPHILS # BLD AUTO: 0.03 K/UL (ref 0–0.2)
BASOPHILS NFR BLD: 0.3 % (ref 0–1.9)
BILIRUB SERPL-MCNC: 0.3 MG/DL (ref 0.1–1)
BUN SERPL-MCNC: 10 MG/DL (ref 6–20)
CALCIUM SERPL-MCNC: 7.1 MG/DL (ref 8.7–10.5)
CHLORIDE SERPL-SCNC: 104 MMOL/L (ref 95–110)
CO2 SERPL-SCNC: 27 MMOL/L (ref 23–29)
CREAT SERPL-MCNC: 0.8 MG/DL (ref 0.5–1.4)
DIFFERENTIAL METHOD: ABNORMAL
EOSINOPHIL # BLD AUTO: 0.2 K/UL (ref 0–0.5)
EOSINOPHIL NFR BLD: 1.6 % (ref 0–8)
ERYTHROCYTE [DISTWIDTH] IN BLOOD BY AUTOMATED COUNT: 19.2 % (ref 11.5–14.5)
EST. GFR  (AFRICAN AMERICAN): >60 ML/MIN/1.73 M^2
EST. GFR  (NON AFRICAN AMERICAN): >60 ML/MIN/1.73 M^2
GLUCOSE SERPL-MCNC: 67 MG/DL (ref 70–110)
HCO3 UR-SCNC: 39.1 MMOL/L (ref 24–28)
HCO3 UR-SCNC: 40.4 MMOL/L (ref 24–28)
HCT VFR BLD AUTO: 36 % (ref 40–54)
HGB BLD-MCNC: 10.6 G/DL (ref 14–18)
IMM GRANULOCYTES # BLD AUTO: 0.05 K/UL (ref 0–0.04)
IMM GRANULOCYTES NFR BLD AUTO: 0.5 % (ref 0–0.5)
LYMPHOCYTES # BLD AUTO: 2.2 K/UL (ref 1–4.8)
LYMPHOCYTES NFR BLD: 22.4 % (ref 18–48)
MCH RBC QN AUTO: 26.8 PG (ref 27–31)
MCHC RBC AUTO-ENTMCNC: 29.4 G/DL (ref 32–36)
MCV RBC AUTO: 91 FL (ref 82–98)
MONOCYTES # BLD AUTO: 0.7 K/UL (ref 0.3–1)
MONOCYTES NFR BLD: 7.3 % (ref 4–15)
NEUTROPHILS # BLD AUTO: 6.5 K/UL (ref 1.8–7.7)
NEUTROPHILS NFR BLD: 67.9 % (ref 38–73)
NRBC BLD-RTO: 0 /100 WBC
PCO2 BLDA: 64 MMHG (ref 35–45)
PCO2 BLDA: 90.8 MMHG (ref 35–45)
PH SMN: 7.26 [PH] (ref 7.35–7.45)
PH SMN: 7.39 [PH] (ref 7.35–7.45)
PLATELET # BLD AUTO: 283 K/UL (ref 150–450)
PMV BLD AUTO: 10 FL (ref 9.2–12.9)
PO2 BLDA: 19 MMHG (ref 40–60)
PO2 BLDA: 60 MMHG (ref 40–60)
POC BE: 13 MMOL/L
POC BE: 14 MMOL/L
POC SATURATED O2: 21 % (ref 95–100)
POC SATURATED O2: 89 % (ref 95–100)
POC TCO2: 41 MMOL/L (ref 24–29)
POC TCO2: 43 MMOL/L (ref 24–29)
POTASSIUM SERPL-SCNC: 4.5 MMOL/L (ref 3.5–5.1)
PROT SERPL-MCNC: 5.2 G/DL (ref 6–8.4)
RBC # BLD AUTO: 3.95 M/UL (ref 4.6–6.2)
SAMPLE: ABNORMAL
SAMPLE: ABNORMAL
SITE: ABNORMAL
SITE: ABNORMAL
SODIUM SERPL-SCNC: 138 MMOL/L (ref 136–145)
WBC # BLD AUTO: 9.59 K/UL (ref 3.9–12.7)

## 2022-02-21 PROCEDURE — 94640 AIRWAY INHALATION TREATMENT: CPT

## 2022-02-21 PROCEDURE — 96374 THER/PROPH/DIAG INJ IV PUSH: CPT

## 2022-02-21 PROCEDURE — 99291 CRITICAL CARE FIRST HOUR: CPT | Mod: 25

## 2022-02-21 PROCEDURE — 27000221 HC OXYGEN, UP TO 24 HOURS

## 2022-02-21 PROCEDURE — 94660 CPAP INITIATION&MGMT: CPT

## 2022-02-21 PROCEDURE — 99291 PR CRITICAL CARE, E/M 30-74 MINUTES: ICD-10-PCS | Mod: ,,, | Performed by: EMERGENCY MEDICINE

## 2022-02-21 PROCEDURE — 25000242 PHARM REV CODE 250 ALT 637 W/ HCPCS: Performed by: STUDENT IN AN ORGANIZED HEALTH CARE EDUCATION/TRAINING PROGRAM

## 2022-02-21 PROCEDURE — 80053 COMPREHEN METABOLIC PANEL: CPT | Performed by: STUDENT IN AN ORGANIZED HEALTH CARE EDUCATION/TRAINING PROGRAM

## 2022-02-21 PROCEDURE — 27000190 HC CPAP FULL FACE MASK W/VALVE

## 2022-02-21 PROCEDURE — 93005 ELECTROCARDIOGRAM TRACING: CPT

## 2022-02-21 PROCEDURE — 85025 COMPLETE CBC W/AUTO DIFF WBC: CPT | Performed by: STUDENT IN AN ORGANIZED HEALTH CARE EDUCATION/TRAINING PROGRAM

## 2022-02-21 PROCEDURE — 99900035 HC TECH TIME PER 15 MIN (STAT)

## 2022-02-21 PROCEDURE — 82803 BLOOD GASES ANY COMBINATION: CPT

## 2022-02-21 PROCEDURE — 99291 CRITICAL CARE FIRST HOUR: CPT | Mod: ,,, | Performed by: EMERGENCY MEDICINE

## 2022-02-21 PROCEDURE — 94761 N-INVAS EAR/PLS OXIMETRY MLT: CPT

## 2022-02-21 PROCEDURE — 93010 ELECTROCARDIOGRAM REPORT: CPT | Mod: ,,, | Performed by: INTERNAL MEDICINE

## 2022-02-21 PROCEDURE — 93010 EKG 12-LEAD: ICD-10-PCS | Mod: ,,, | Performed by: INTERNAL MEDICINE

## 2022-02-21 PROCEDURE — 63600175 PHARM REV CODE 636 W HCPCS: Performed by: STUDENT IN AN ORGANIZED HEALTH CARE EDUCATION/TRAINING PROGRAM

## 2022-02-21 RX ORDER — PREDNISONE 20 MG/1
40 TABLET ORAL DAILY
Qty: 10 TABLET | Refills: 0 | Status: SHIPPED | OUTPATIENT
Start: 2022-02-21 | End: 2022-02-28

## 2022-02-21 RX ORDER — BUDESONIDE, GLYCOPYRROLATE, AND FORMOTEROL FUMARATE 160; 9; 4.8 UG/1; UG/1; UG/1
2 AEROSOL, METERED RESPIRATORY (INHALATION) 2 TIMES DAILY
Qty: 10.7 G | Refills: 0 | Status: ON HOLD | OUTPATIENT
Start: 2022-02-21 | End: 2022-03-07 | Stop reason: SDUPTHER

## 2022-02-21 RX ORDER — TIOTROPIUM BROMIDE 18 UG/1
1 CAPSULE ORAL; RESPIRATORY (INHALATION) DAILY
Qty: 30 CAPSULE | Refills: 0 | Status: ON HOLD | OUTPATIENT
Start: 2022-02-21 | End: 2022-03-07 | Stop reason: SDUPTHER

## 2022-02-21 RX ORDER — IPRATROPIUM BROMIDE AND ALBUTEROL SULFATE 2.5; .5 MG/3ML; MG/3ML
3 SOLUTION RESPIRATORY (INHALATION) EVERY 4 HOURS PRN
Qty: 180 ML | Refills: 0 | Status: SHIPPED | OUTPATIENT
Start: 2022-02-21 | End: 2022-07-07 | Stop reason: SDUPTHER

## 2022-02-21 RX ORDER — METHYLPREDNISOLONE SOD SUCC 125 MG
125 VIAL (EA) INJECTION
Status: COMPLETED | OUTPATIENT
Start: 2022-02-21 | End: 2022-02-21

## 2022-02-21 RX ORDER — IPRATROPIUM BROMIDE AND ALBUTEROL SULFATE 2.5; .5 MG/3ML; MG/3ML
3 SOLUTION RESPIRATORY (INHALATION)
Status: COMPLETED | OUTPATIENT
Start: 2022-02-21 | End: 2022-02-21

## 2022-02-21 RX ADMIN — IPRATROPIUM BROMIDE AND ALBUTEROL SULFATE 3 ML: .5; 3 SOLUTION RESPIRATORY (INHALATION) at 09:02

## 2022-02-21 RX ADMIN — METHYLPREDNISOLONE SODIUM SUCCINATE 125 MG: 125 INJECTION, POWDER, FOR SOLUTION INTRAMUSCULAR; INTRAVENOUS at 09:02

## 2022-02-21 NOTE — PLAN OF CARE
Initial Discharge Planning Case Management Assessment:     Patient admitted on 2-21-22  Chart reviewed     DME at home: oxygen  Current dispo: home today  Discussed u-turn hospital concerns in detail with Mr Mccray  Also discussed u-turn concerns with med team, Dr Munoz and Dr Swartz.  Dr Swartz completed the pre-auth today for [employer] handihaler, will be available tomorrow and Mr Mccray agreed to pick it up.     Transportation: limited  Consults following: case mgt.  Case management  to follow as needed    [facility]  Initial Discharge Assessment       Primary Care Provider: Jaden    Admission Diagnosis: No admission diagnoses are documented for this encounter.    Admission Date: 2/21/2022  Expected Discharge Date:     Discharge Barriers Identified: (P) Does not adhere to care plan    Payor: MEDICAID / Plan: MEDICAID OF [place] / Product Type: Government /     Extended Emergency Contact Information  Primary Emergency Contact: Mahendra Gladis   [place]  Mobile Phone: [phone]  Relation: Sister  Secondary Emergency Contact: Viry Mccray   [place]  Mobile Phone: [phone]  Relation: Sister    Discharge Plan A: (P) Home Health, Home         [id number] - [place] - [address] AT [facility] [address]  [facility] [place] [address]  Phone: [phone] Fax: [phone]      Initial Assessment (most recent)       Adult Discharge Assessment - 02/21/22 1513          Discharge Assessment    Assessment Type Discharge Planning Assessment (P)      Confirmed/corrected address, phone number and insurance Yes (P)      Confirmed Demographics Correct on Facesheet (P)      Source of Information patient;health record (P)      Communicated RUTH with patient/caregiver Yes (P)      Lives With alone (P)      Do you have help at home or someone to help you manage your care at home? No (P)       Prior to hospitilization cognitive status: Alert/Oriented (P)      Current cognitive status: Alert/Oriented (P)      Dressing/Bathing Difficulty none (P)      Equipment Currently Used at Home oxygen (P)      Readmission within 30 days? Yes (P)      Do you currently have service(s) that help you manage your care at home? No (P)      Do you take prescription medications? Yes (P)      Do you have prescription coverage? Yes (P)      Do you have any problems affording any of your prescribed medications? No (P)      Is the patient taking medications as prescribed? yes (P)      How do you get to doctors appointments? health plan transportation;public transportation (P)      Discharge Plan A Home Health;Home (P)      DME Needed Upon Discharge  none (P)      Discharge Barriers Identified Does not adhere to care plan (P)

## 2022-02-21 NOTE — DISCHARGE INSTRUCTIONS
You were seen in the emergency department for difficulty breathing related to COPD.  Diagnosis: COPD exacerbation    Return to the emergency department if you have signs of severe difficulty breathing including:   - Breathing too fast  - Not able to say more than 2-3 words at a time without taking a breath  - Blue-endy or gray/dusky color of the face, lips or fingers  - Muscles pulling in around the neck or ribs    Return to the emergency department at any time if you think that you are getting worse.    Home Care Instructions:  - Take albuterol (inhaler 4 puffs or nebulizer) every 4 hours as needed for wheezing or difficulty breathing  - Take the prescribed steroid as directed  - Continue taking other home medications as previously prescribed    Follow-Up Plan:  - Follow-up with: Primary care doctor within 2 - 3 days  - Additional outpatient testing and/or evaluation as directed by your doctor

## 2022-02-21 NOTE — ED NOTES
Baltazar Mccray, a 61 y.o. male presents to the ED w/ complaint of sob c.o he is out of his albuterol feels better now    Triage note:  Chief Complaint   Patient presents with    Shortness of Breath     Out of albuterol      Review of patient's allergies indicates:   Allergen Reactions    Benazepril Swelling    Duoneb [ipratropium-albuterol]      Report minor Tremors, pt seems to be tolerating well.     Past Medical History:   Diagnosis Date    Asthma     Atrial fibrillation with rapid ventricular response     CHF (congestive heart failure)     COPD (chronic obstructive pulmonary disease)     home O2 at night only    Coronary artery disease     Hypertension     Lung nodule     Pneumonia     Seizures      LOC: Patient name and date of birth verified. The patient is awake, alert and aware of environment with an appropriate affect, the patient is oriented x 3 and speaking appropriately.   APPEARANCE: Patient resting comfortably, patient is clean and well groomed, patient's clothing is properly fastened.  SKIN: The skin is warm and dry, color consistent with ethnicity, patient has normal skin turgor and moist mucus membranes, skin intact, no breakdown or bruising noted.  MUSCULOSKELETAL: Patient moving all extremities well, no obvious swelling or deformities noted.   RESPIRATORY: Respirations are spontaneous, patient has a normal effort with a slight increase in breaths per minute, no accessory muscle use noted.  CARDIAC: Patient has a normal rate and rhythm, no periphreal edema noted, capillary refill < 3 seconds.  ABDOMEN: Soft and non tender to palpation, no distention noted. Bowel sounds present in all four quadrants.  NEUROLOGIC: Eyes open spontaneously, behavior appropriate to situation, follows commands, facial expression symmetrical, bilateral hand grasp equal and even, purposeful motor response noted, normal sensation in all extremities when touched with a finger.

## 2022-02-21 NOTE — ED PROVIDER NOTES
Encounter Date: 2/21/2022       History     Chief Complaint   Patient presents with    Shortness of Breath     Out of albuterol      61-year-old male with PMH of COPD (on 3 L O2 nasal cannula at home), CHF, hypertension, and AFib presents with shortness of breath.  Shortness of breath is new x2 days, progressive, constant, aggravated with exertion, no relieving factors, and associated with baseline nonproductive cough.  Patient denies fever, chills, chest pain, back pain, abdominal pain, nausea, vomiting, dysuria, hematuria, diarrhea, constipation, black/bloody stools.  Patient states he has been out of his albuterol inhaler/nebulizer for the past week.  Per EMS, the patient was satting 86% on his home oxygen.  They gave 1 DuoNeb EN route and placed him on a non-rebreather.  Current tobacco use. No increased sputum production or purulent sputum.     The history is provided by the patient.     Review of patient's allergies indicates:   Allergen Reactions    Benazepril Swelling    Duoneb [ipratropium-albuterol]      Report minor Tremors, pt seems to be tolerating well.     Past Medical History:   Diagnosis Date    Asthma     Atrial fibrillation with rapid ventricular response     CHF (congestive heart failure)     COPD (chronic obstructive pulmonary disease)     home O2 at night only    Coronary artery disease     Hypertension     Lung nodule     Pneumonia     Seizures      Past Surgical History:   Procedure Laterality Date    ESOPHAGOGASTRODUODENOSCOPY N/A 2/11/2022    Procedure: EGD (ESOPHAGOGASTRODUODENOSCOPY);  Surgeon: Cain Ortega MD;  Location: [facility] ENDO ([address] [place] [address]);  Service: Endoscopy;  Laterality: N/A;    LEFT HEART CATHETERIZATION  4/23/2020    Procedure: Left heart cath;  Surgeon: Nic Pollack MD;  Location: [facility] CATH LAB;  Service: Cardiology;;     Family History   Problem Relation Age of Onset    Cancer Father     Hypertension Sister     Stroke Sister     Stroke Brother      Diabetes Neg Hx     Heart disease Neg Hx      Social History     Tobacco Use    Smoking status: Current Some Day Smoker     Packs/day: 0.25     Types: Cigarettes    Smokeless tobacco: Never Used    Tobacco comment: 3-4 per day   Substance Use Topics    Alcohol use: Yes     Alcohol/week: 2.0 standard drinks     Types: 2 Cans of beer per week     Comment: every night    Drug use: No     Review of Systems   Constitutional: Negative for chills and fever.   HENT: Negative for congestion and sore throat.    Eyes: Negative for pain and visual disturbance.   Respiratory: Positive for cough and shortness of breath.    Cardiovascular: Negative for chest pain and leg swelling.   Gastrointestinal: Negative for abdominal pain, constipation, diarrhea, nausea and vomiting.   Endocrine: Negative for polydipsia and polyuria.   Genitourinary: Negative for dysuria and hematuria.   Musculoskeletal: Negative for arthralgias and back pain.   Skin: Negative for color change and rash.   Neurological: Negative for dizziness, syncope, weakness and headaches.       Physical Exam     Initial Vitals [02/21/22 0849]   BP Pulse Resp Temp SpO2   (!) 182/100 (!) 120 (!) 22 98 °F (36.7 °C) 99 %      MAP       --         Physical Exam    Nursing note and vitals reviewed.  Constitutional: He appears well-developed and well-nourished. He is not diaphoretic.   HENT:   Head: Normocephalic and atraumatic.   Eyes: Conjunctivae and EOM are normal. Pupils are equal, round, and reactive to light.   Neck: Neck supple.   Normal range of motion.  Cardiovascular: Regular rhythm, normal heart sounds and intact distal pulses.   No murmur heard.  Tachycardia   Pulmonary/Chest: He has no rhonchi. He has no rales.   Increased work of breathing and tachypnea.  Bilateral wheezes with poor air movement   Abdominal: Abdomen is soft. He exhibits no distension. There is no abdominal tenderness. There is no rebound and no guarding.   Musculoskeletal:         General:  No tenderness or edema.      Cervical back: Normal range of motion and neck supple.     Neurological: He is alert and oriented to person, place, and time. He has normal strength. No sensory deficit.   Skin: Skin is warm and dry. Capillary refill takes less than 2 seconds.   Psychiatric: He has a normal mood and affect.         ED Course   Critical Care    Date/Time: 2/21/2022 9:47 AM  Performed by: Guilherme Munoz DO  Authorized by: Guilherme Munoz DO   Direct patient critical care time: 15 minutes  Additional history critical care time: 20 minutes  Ordering / reviewing critical care time: 15 minutes  Documentation critical care time: 7 minutes  Consult with family critical care time: 10 minutes  Total critical care time (exclusive of procedural time) : 67 minutes  Critical care was necessary to treat or prevent imminent or life-threatening deterioration of the following conditions: respiratory failure.  Critical care was time spent personally by me on the following activities: development of treatment plan with patient or surrogate, evaluation of patient's response to treatment, examination of patient, ordering and performing treatments and interventions, obtaining history from patient or surrogate, ordering and review of laboratory studies, ordering and review of radiographic studies, pulse oximetry, re-evaluation of patient's condition and review of old charts.  Comments: BiPAP management        Labs Reviewed   CBC W/ AUTO DIFFERENTIAL - Abnormal; Notable for the following components:       Result Value    RBC 3.95 (*)     Hemoglobin 10.6 (*)     Hematocrit 36.0 (*)     MCH 26.8 (*)     MCHC 29.4 (*)     RDW 19.2 (*)     Immature Grans (Abs) 0.05 (*)     All other components within normal limits   COMPREHENSIVE METABOLIC PANEL - Abnormal; Notable for the following components:    Glucose 67 (*)     Calcium 7.1 (*)     Total Protein 5.2 (*)     Albumin 2.4 (*)     AST 63 (*)     ALT 49 (*)     Anion Gap 7 (*)      All other components within normal limits   ISTAT PROCEDURE - Abnormal; Notable for the following components:    POC PH 7.257 (*)     POC PCO2 90.8 (*)     POC PO2 19 (*)     POC HCO3 40.4 (*)     POC SATURATED O2 21 (*)     POC TCO2 43 (*)     All other components within normal limits   ISTAT PROCEDURE - Abnormal; Notable for the following components:    POC PCO2 64.0 (*)     POC HCO3 39.1 (*)     POC SATURATED O2 89 (*)     POC TCO2 41 (*)     All other components within normal limits     EKG Readings: (Independently Interpreted)   Initial Reading: No STEMI. Previous EKG: Compared with most recent EKG Rhythm: Normal Sinus Rhythm. Heart Rate: 97. ST Segments: Non-Specific ST Segment Depression.     ECG Results          EKG 12-lead (Final result)  Result time 02/21/22 10:20:35    Final result by Interface, Lab In [facility] (02/21/22 10:20:35)                 Narrative:    Test Reason : R00.0,    Vent. Rate : 097 BPM     Atrial Rate : 097 BPM     P-R Int : 172 ms          QRS Dur : 084 ms      QT Int : 342 ms       P-R-T Axes : 086 081 085 degrees     QTc Int : 434 ms    Normal sinus rhythm  Right atrial enlargement  Low septal forces  Abnormal ECG  When compared with ECG of 08-FEB-2022 23:05,  No significant change was found  Confirmed by Bryan DOBBINS MD ([id number]) on 2/21/2022 10:20:25 AM    Referred By: [email or web address]   SELF           Confirmed By:Bryan DOBBINS MD                            Imaging Results          X-Ray Chest AP Portable (Final result)  Result time 02/21/22 09:36:20    Final result by Napoleon Murphy III, MD (02/21/22 09:36:20)                 Impression:      No acute process seen.      Electronically signed by: Napoleon Murphy MD  Date:    02/21/2022  Time:    09:36             Narrative:    EXAMINATION:  XR CHEST AP PORTABLE    CLINICAL HISTORY:  sob;    FINDINGS:  Chest one view: Heart size is normal.  There is aortic plaque.  Lungs are clear.                              X-Rays:   Independently Interpreted  Readings:   Chest X-Ray: Normal heart size.  No infiltrates.  No acute abnormalities. No pneumothorax or free air     Medications   albuterol-ipratropium 2.5 mg-0.5 mg/3 mL nebulizer solution 3 mL (3 mLs Nebulization Given 2/21/22 0921)   methylPREDNISolone sodium succinate injection 125 mg (125 mg Intravenous Given 2/21/22 0919)     Medical Decision Making:   History:   Old Medical Records: I decided to obtain old medical records.  Old Records Summarized: other records and records from previous admission(s).       <> Summary of Records: Reviewed previous visits for multiple COPD, shortness of breath and medication refill evaluations.  Initial Assessment:   61-year-old male with PMH of COPD (on 3 L O2 nasal cannula at home), CHF, hypertension, and AFib presents with a few day history of progressive shortness of breath after running out of his COPD medications, afebrile, hypertensive, tachycardic, and tachypneic, PE shows b/l wheezes and decreased air movement. Will treat with steroids, duonebs x3, and obtain workup.   Differential Diagnosis:   COPD exacerbation, covid, pneumonia, viral URI  Independently Interpreted Test(s):   I have ordered and independently interpreted X-rays - see prior notes.  I have ordered and independently interpreted EKG Reading(s) - see prior notes  Clinical Tests:   Lab Tests: Ordered and Reviewed  Radiological Study: Ordered and Reviewed  Medical Tests: Ordered and Reviewed  ED Management:  See ED course            Attending Attestation:   Physician Attestation Statement for Resident:  As the supervising MD   Physician Attestation Statement: I have personally seen and examined this patient.   I agree with the above history. -:   As the supervising MD I agree with the above PE.    As the supervising MD I agree with the above treatment, course, plan, and disposition.                 I have reviewed and concur with the resident's history, physical, assessment, and plan.  I have personally  interviewed and examined the patient at bedside.  See below addendum for my evaluation and additional findings. I did supervise any and all procedures and was present for any critical portion, and was always immediately available for help and as a resource.     Briefly,  this is a 61 y.o.male with a history of COPD on 3 L nasal cannula at home, CHF, hypertension and atrial fibrillation presenting with shortness of breath.  Patient has been noncompliant with his home medications for 1 week as he has been out of his inhalers and nebulizers.  He is brought in via EMS with hypoxemia with 86% oxygen saturation.  He was given DuoNeb and placed on non-rebreather with improvement.  On arrival, ECG obtained with no signs of ischemia or STEMI on my read.  Chest x-ray shows no acute findings on my read.  Physical exam findings with tachycardia, shortness of breath, expiratory wheeze and increased work of breathing.  Initial venous blood gas shows pCO2 of 90, with acidosis.  Suspect likely respiratory acidosis.  Placed on BiPAP, given DuoNeb and nebulize treatment including oxygen support.  On re-evaluation, venous blood gas back to baseline with pCO2 near patient's baseline.  Patient ambulatory and stable on home oxygen support.  Patient's prescriptions were refilled, case was discussed with social work, patient will be monitored with case management, and given a ride home.  We have ensured that he has refills for his medication, with strict ED precautions and return instructions discussed at length.  Given initial severity, respiratory acidosis requiring BiPAP support, please see critical care note for critical care time.    Complexity:  Critical care    Final diagnoses:  [R00.0] Tachycardia  [J44.1] COPD exacerbation (Primary)  [J96.01] Acute hypoxemic respiratory failure  [J96.01, J96.02] Acute respiratory failure with hypoxia and hypercapnia       Guilherme Munoz DO, FAAEM  Emergency Staff Physician   Dept of Emergency  Medicine   [facility]  Spectralink: [id number]        Disclaimer: This note has been generated using voice-recognition software. There may be typographical errors that have been missed during proof-reading.          ED Course as of 02/21/22 1528   Mon Feb 21, 2022   0931 CBC auto differential(!)  CBC unremarkable without leukocytosis or decreased platelets. Moderate anemia, which is chronic   [BD]   0931 ISTAT PROCEDURE(!)  VBG with notable acidosis of pH 7.257 and pCO2 90. We will place patient on BiPAP  [BD]   0943 X-Ray Chest AP Portable  Chest x-ray shows no acute process such as pneumonia, pneumothorax, or pulmonary edema.    [BD]   1156 ISTAT PROCEDURE(!)  VBG shows significant improvement with normal pH 7.39 and improved pCO2 of 64. Will stop BiPAP and trial on home O2 [BD]   1157 Comprehensive metabolic panel(!)  Hypoglycemia- will give sandwich and juice. Otherwise, unremarkable CMP [BD]   1237 Pt taken off of BiPAP and has remained stable on home O2    Patient has been stable on his home O2 and was able to ambulate without shortness of breath. He may be discharged at this time.  He has been given a course of steroids, home instructions, and strict return precautions.  I tried to fill the patient's 3 inhalers at his request because he states he was out of them.  I was only able to obtain the albuterol for him which be delivered bedside.  The Spiriva is not due for refill for another 11 days and the budesonide requires prior authorization which has been initiated and should be ready tomorrow.  The patient is aware of this.      Update:  About an hour after the patient left, is discharged papers including his prescription were found on the sidewalk outside of the ED.  [BD]      ED Course User Index  [BD] Jay Swartz MD             Clinical Impression:   Final diagnoses:  [R00.0] Tachycardia  [J44.1] COPD exacerbation (Primary)  [J96.01] Acute hypoxemic respiratory failure  [J96.01, J96.02] Acute  respiratory failure with hypoxia and hypercapnia          ED Disposition Condition    Discharge Stable        ED Prescriptions     Medication Sig Dispense Start Date End Date Auth. Provider    albuterol-ipratropium (DUO-NEB) 2.5 mg-0.5 mg/3 mL nebulizer solution Take 3 mLs by nebulization every 4 (four) hours as needed for Wheezing or Shortness of Breath. Rescue 180 mL 2/21/2022 2/21/2023 Jay Swartz MD    budesonide-glycopyr-formoterol (BREZTRI AEROSPHERE) 160-9-4.8 mcg/actuation HFAA Inhale 2 puffs into the lungs 2 (two) times daily. Rinse mouth after use. 10.7 g 2/21/2022  Jay Swartz MD    SPIRIVA WITH HANDIHALER 18 mcg inhalation capsule Inhale 1 capsule (18 mcg total) into the lungs once daily. 30 capsule 2/21/2022  Jay Swartz MD    predniSONE (DELTASONE) 20 MG tablet Take 2 tablets (40 mg total) by mouth once daily. for 5 days 10 tablet 2/21/2022 2/26/2022 Jay Swartz MD        Follow-up Information     Follow up With Specialties Details Why Contact Info    Primary Care Physician  Schedule an appointment as soon as possible for a visit  As needed and to discuss recent ER visit     [facility] - Emergency Dept Emergency Medicine Go to  As needed, If symptoms worsen [id number]  [facility] [id number] [phone]           Jay Swartz MD  Resident  02/21/22 1535       Guilherme Munoz DO  02/22/22 [id number]

## 2022-02-22 ENCOUNTER — TELEPHONE (OUTPATIENT)
Dept: PHARMACY | Facility: CLINIC | Age: 62
End: 2022-02-22
Payer: MEDICAID

## 2022-02-24 ENCOUNTER — TELEPHONE (OUTPATIENT)
Dept: ENDOSCOPY | Facility: HOSPITAL | Age: 62
End: 2022-02-24
Payer: MEDICAID

## 2022-02-24 NOTE — TELEPHONE ENCOUNTER
Contacted patient to schedule EGD.  Informed him that he needs to be cleared by Pulmonology since he had recent EGD visit with lung issues.  Stated understanding.   Direct line phone number provided to return call after clearance.

## 2022-03-05 ENCOUNTER — HOSPITAL ENCOUNTER (INPATIENT)
Facility: HOSPITAL | Age: 62
LOS: 2 days | Discharge: HOME | DRG: 190 | End: 2022-03-07
Attending: EMERGENCY MEDICINE | Admitting: STUDENT IN AN ORGANIZED HEALTH CARE EDUCATION/TRAINING PROGRAM
Payer: MEDICAID

## 2022-03-05 DIAGNOSIS — J44.9 CHRONIC OBSTRUCTIVE PULMONARY DISEASE, UNSPECIFIED COPD TYPE: ICD-10-CM

## 2022-03-05 DIAGNOSIS — J44.1 COPD EXACERBATION: ICD-10-CM

## 2022-03-05 DIAGNOSIS — R07.9 CHEST PAIN: ICD-10-CM

## 2022-03-05 DIAGNOSIS — R06.02 SHORTNESS OF BREATH: ICD-10-CM

## 2022-03-05 PROBLEM — I48.91 ATRIAL FIBRILLATION: Status: ACTIVE | Noted: 2022-03-05

## 2022-03-05 PROBLEM — K25.9 GASTRIC ULCER: Status: ACTIVE | Noted: 2022-03-05

## 2022-03-05 LAB
ALBUMIN SERPL BCP-MCNC: 2.9 G/DL (ref 3.5–5.2)
ALLENS TEST: ABNORMAL
ALP SERPL-CCNC: 77 U/L (ref 55–135)
ALT SERPL W/O P-5'-P-CCNC: 18 U/L (ref 10–44)
ANION GAP SERPL CALC-SCNC: 16 MMOL/L (ref 8–16)
AST SERPL-CCNC: 14 U/L (ref 10–40)
BASOPHILS # BLD AUTO: 0.05 K/UL (ref 0–0.2)
BASOPHILS NFR BLD: 0.5 % (ref 0–1.9)
BILIRUB SERPL-MCNC: 0.7 MG/DL (ref 0.1–1)
BNP SERPL-MCNC: 93 PG/ML (ref 0–99)
BUN SERPL-MCNC: 9 MG/DL (ref 6–20)
CALCIUM SERPL-MCNC: 9.6 MG/DL (ref 8.7–10.5)
CHLORIDE SERPL-SCNC: 93 MMOL/L (ref 95–110)
CO2 SERPL-SCNC: 30 MMOL/L (ref 23–29)
CREAT SERPL-MCNC: 0.7 MG/DL (ref 0.5–1.4)
CTP QC/QA: YES
DELSYS: ABNORMAL
DELSYS: ABNORMAL
DIFFERENTIAL METHOD: ABNORMAL
EOSINOPHIL # BLD AUTO: 0.2 K/UL (ref 0–0.5)
EOSINOPHIL NFR BLD: 1.8 % (ref 0–8)
EP: 5
ERYTHROCYTE [DISTWIDTH] IN BLOOD BY AUTOMATED COUNT: 17.9 % (ref 11.5–14.5)
ERYTHROCYTE [SEDIMENTATION RATE] IN BLOOD BY WESTERGREN METHOD: 10 MM/H
EST. GFR  (AFRICAN AMERICAN): >60 ML/MIN/1.73 M^2
EST. GFR  (NON AFRICAN AMERICAN): >60 ML/MIN/1.73 M^2
FIO2: 50
GLUCOSE SERPL-MCNC: 81 MG/DL (ref 70–110)
HCO3 UR-SCNC: 40.7 MMOL/L (ref 24–28)
HCO3 UR-SCNC: 42 MMOL/L (ref 24–28)
HCO3 UR-SCNC: 43 MMOL/L (ref 24–28)
HCT VFR BLD AUTO: 42.7 % (ref 40–54)
HCT VFR BLD CALC: 42 %PCV (ref 36–54)
HGB BLD-MCNC: 13.1 G/DL (ref 14–18)
IMM GRANULOCYTES # BLD AUTO: 0.06 K/UL (ref 0–0.04)
IMM GRANULOCYTES NFR BLD AUTO: 0.5 % (ref 0–0.5)
IP: 12
LYMPHOCYTES # BLD AUTO: 1.6 K/UL (ref 1–4.8)
LYMPHOCYTES NFR BLD: 14.4 % (ref 18–48)
MCH RBC QN AUTO: 26.6 PG (ref 27–31)
MCHC RBC AUTO-ENTMCNC: 30.7 G/DL (ref 32–36)
MCV RBC AUTO: 87 FL (ref 82–98)
MODE: ABNORMAL
MONOCYTES # BLD AUTO: 1 K/UL (ref 0.3–1)
MONOCYTES NFR BLD: 9.3 % (ref 4–15)
NEUTROPHILS # BLD AUTO: 8 K/UL (ref 1.8–7.7)
NEUTROPHILS NFR BLD: 73.5 % (ref 38–73)
NRBC BLD-RTO: 0 /100 WBC
PCO2 BLDA: 74.8 MMHG (ref 35–45)
PCO2 BLDA: 78.7 MMHG (ref 35–45)
PCO2 BLDA: 80.3 MMHG (ref 35–45)
PH SMN: 7.32 [PH] (ref 7.35–7.45)
PH SMN: 7.34 [PH] (ref 7.35–7.45)
PH SMN: 7.36 [PH] (ref 7.35–7.45)
PLATELET # BLD AUTO: 234 K/UL (ref 150–450)
PMV BLD AUTO: 10.9 FL (ref 9.2–12.9)
PO2 BLDA: 43 MMHG (ref 40–60)
PO2 BLDA: 59 MMHG (ref 40–60)
PO2 BLDA: 85 MMHG (ref 40–60)
POC BE: 15 MMOL/L
POC BE: 17 MMOL/L
POC BE: 17 MMOL/L
POC IONIZED CALCIUM: 1.19 MMOL/L (ref 1.06–1.42)
POC SATURATED O2: 72 % (ref 95–100)
POC SATURATED O2: 87 % (ref 95–100)
POC SATURATED O2: 95 % (ref 95–100)
POC TCO2: 43 MMOL/L (ref 24–29)
POC TCO2: 44 MMOL/L (ref 24–29)
POC TCO2: 45 MMOL/L (ref 24–29)
POTASSIUM BLD-SCNC: 4 MMOL/L (ref 3.5–5.1)
POTASSIUM SERPL-SCNC: 4.1 MMOL/L (ref 3.5–5.1)
PROT SERPL-MCNC: 6.8 G/DL (ref 6–8.4)
RBC # BLD AUTO: 4.92 M/UL (ref 4.6–6.2)
SAMPLE: ABNORMAL
SARS-COV-2 RDRP RESP QL NAA+PROBE: NEGATIVE
SITE: ABNORMAL
SODIUM BLD-SCNC: 137 MMOL/L (ref 136–145)
SODIUM SERPL-SCNC: 139 MMOL/L (ref 136–145)
TROPONIN I SERPL DL<=0.01 NG/ML-MCNC: 0.03 NG/ML (ref 0–0.03)
TROPONIN I SERPL DL<=0.01 NG/ML-MCNC: 0.03 NG/ML (ref 0–0.03)
WBC # BLD AUTO: 10.92 K/UL (ref 3.9–12.7)

## 2022-03-05 PROCEDURE — 96375 TX/PRO/DX INJ NEW DRUG ADDON: CPT

## 2022-03-05 PROCEDURE — 12000002 HC ACUTE/MED SURGE SEMI-PRIVATE ROOM

## 2022-03-05 PROCEDURE — 94761 N-INVAS EAR/PLS OXIMETRY MLT: CPT

## 2022-03-05 PROCEDURE — 94660 CPAP INITIATION&MGMT: CPT

## 2022-03-05 PROCEDURE — 99900035 HC TECH TIME PER 15 MIN (STAT)

## 2022-03-05 PROCEDURE — 82803 BLOOD GASES ANY COMBINATION: CPT

## 2022-03-05 PROCEDURE — 25000003 PHARM REV CODE 250: Performed by: INTERNAL MEDICINE

## 2022-03-05 PROCEDURE — 93010 ELECTROCARDIOGRAM REPORT: CPT | Mod: ,,, | Performed by: INTERNAL MEDICINE

## 2022-03-05 PROCEDURE — 94640 AIRWAY INHALATION TREATMENT: CPT

## 2022-03-05 PROCEDURE — 93005 ELECTROCARDIOGRAM TRACING: CPT

## 2022-03-05 PROCEDURE — 99291 PR CRITICAL CARE, E/M 30-74 MINUTES: ICD-10-PCS | Mod: CS,,, | Performed by: EMERGENCY MEDICINE

## 2022-03-05 PROCEDURE — 27000221 HC OXYGEN, UP TO 24 HOURS

## 2022-03-05 PROCEDURE — 84484 ASSAY OF TROPONIN QUANT: CPT | Performed by: INTERNAL MEDICINE

## 2022-03-05 PROCEDURE — 82330 ASSAY OF CALCIUM: CPT

## 2022-03-05 PROCEDURE — 83880 ASSAY OF NATRIURETIC PEPTIDE: CPT | Performed by: INTERNAL MEDICINE

## 2022-03-05 PROCEDURE — 27000190 HC CPAP FULL FACE MASK W/VALVE

## 2022-03-05 PROCEDURE — 99291 CRITICAL CARE FIRST HOUR: CPT | Mod: CS,,, | Performed by: EMERGENCY MEDICINE

## 2022-03-05 PROCEDURE — 25000242 PHARM REV CODE 250 ALT 637 W/ HCPCS: Performed by: INTERNAL MEDICINE

## 2022-03-05 PROCEDURE — 93010 EKG 12-LEAD: ICD-10-PCS | Mod: ,,, | Performed by: INTERNAL MEDICINE

## 2022-03-05 PROCEDURE — 84132 ASSAY OF SERUM POTASSIUM: CPT

## 2022-03-05 PROCEDURE — 96365 THER/PROPH/DIAG IV INF INIT: CPT

## 2022-03-05 PROCEDURE — 99291 CRITICAL CARE FIRST HOUR: CPT | Mod: 25

## 2022-03-05 PROCEDURE — 63600175 PHARM REV CODE 636 W HCPCS: Performed by: INTERNAL MEDICINE

## 2022-03-05 PROCEDURE — 94644 CONT INHLJ TX 1ST HOUR: CPT

## 2022-03-05 PROCEDURE — 84295 ASSAY OF SERUM SODIUM: CPT

## 2022-03-05 PROCEDURE — 85025 COMPLETE CBC W/AUTO DIFF WBC: CPT | Performed by: INTERNAL MEDICINE

## 2022-03-05 PROCEDURE — 94645 CONT INHLJ TX EACH ADDL HOUR: CPT

## 2022-03-05 PROCEDURE — U0002 COVID-19 LAB TEST NON-CDC: HCPCS | Performed by: INTERNAL MEDICINE

## 2022-03-05 PROCEDURE — 80053 COMPREHEN METABOLIC PANEL: CPT | Performed by: INTERNAL MEDICINE

## 2022-03-05 RX ORDER — PANTOPRAZOLE SODIUM 40 MG/1
40 TABLET, DELAYED RELEASE ORAL 2 TIMES DAILY
Status: DISCONTINUED | OUTPATIENT
Start: 2022-03-05 | End: 2022-03-07 | Stop reason: HOSPADM

## 2022-03-05 RX ORDER — IBUPROFEN 200 MG
24 TABLET ORAL
Status: DISCONTINUED | OUTPATIENT
Start: 2022-03-05 | End: 2022-03-07 | Stop reason: HOSPADM

## 2022-03-05 RX ORDER — IPRATROPIUM BROMIDE AND ALBUTEROL SULFATE 2.5; .5 MG/3ML; MG/3ML
3 SOLUTION RESPIRATORY (INHALATION)
Status: COMPLETED | OUTPATIENT
Start: 2022-03-05 | End: 2022-03-05

## 2022-03-05 RX ORDER — NALOXONE HCL 0.4 MG/ML
0.02 VIAL (ML) INJECTION
Status: DISCONTINUED | OUTPATIENT
Start: 2022-03-05 | End: 2022-03-07 | Stop reason: HOSPADM

## 2022-03-05 RX ORDER — POLYETHYLENE GLYCOL 3350 17 G/17G
17 POWDER, FOR SOLUTION ORAL DAILY
Status: DISCONTINUED | OUTPATIENT
Start: 2022-03-06 | End: 2022-03-07 | Stop reason: HOSPADM

## 2022-03-05 RX ORDER — IBUPROFEN 200 MG
1 TABLET ORAL DAILY
Status: DISCONTINUED | OUTPATIENT
Start: 2022-03-06 | End: 2022-03-06

## 2022-03-05 RX ORDER — ALBUTEROL SULFATE 2.5 MG/.5ML
15 SOLUTION RESPIRATORY (INHALATION)
Status: COMPLETED | OUTPATIENT
Start: 2022-03-05 | End: 2022-03-05

## 2022-03-05 RX ORDER — FUROSEMIDE 20 MG/1
20 TABLET ORAL DAILY
Status: DISCONTINUED | OUTPATIENT
Start: 2022-03-06 | End: 2022-03-07 | Stop reason: HOSPADM

## 2022-03-05 RX ORDER — ACETAMINOPHEN 500 MG
1000 TABLET ORAL
Status: COMPLETED | OUTPATIENT
Start: 2022-03-05 | End: 2022-03-05

## 2022-03-05 RX ORDER — ACETAMINOPHEN 325 MG/1
650 TABLET ORAL EVERY 4 HOURS PRN
Status: DISCONTINUED | OUTPATIENT
Start: 2022-03-05 | End: 2022-03-07 | Stop reason: HOSPADM

## 2022-03-05 RX ORDER — ENOXAPARIN SODIUM 100 MG/ML
40 INJECTION SUBCUTANEOUS EVERY 24 HOURS
Status: DISCONTINUED | OUTPATIENT
Start: 2022-03-05 | End: 2022-03-07 | Stop reason: HOSPADM

## 2022-03-05 RX ORDER — IPRATROPIUM BROMIDE AND ALBUTEROL SULFATE 2.5; .5 MG/3ML; MG/3ML
3 SOLUTION RESPIRATORY (INHALATION) EVERY 6 HOURS
Status: DISCONTINUED | OUTPATIENT
Start: 2022-03-06 | End: 2022-03-07 | Stop reason: HOSPADM

## 2022-03-05 RX ORDER — CARVEDILOL 12.5 MG/1
12.5 TABLET ORAL 2 TIMES DAILY WITH MEALS
Status: DISCONTINUED | OUTPATIENT
Start: 2022-03-06 | End: 2022-03-07 | Stop reason: HOSPADM

## 2022-03-05 RX ORDER — PREDNISONE 20 MG/1
40 TABLET ORAL DAILY
Status: DISCONTINUED | OUTPATIENT
Start: 2022-03-06 | End: 2022-03-07 | Stop reason: HOSPADM

## 2022-03-05 RX ORDER — ALBUTEROL SULFATE 90 UG/1
2 AEROSOL, METERED RESPIRATORY (INHALATION) EVERY 6 HOURS PRN
Status: DISCONTINUED | OUTPATIENT
Start: 2022-03-05 | End: 2022-03-07 | Stop reason: HOSPADM

## 2022-03-05 RX ORDER — IBUPROFEN 200 MG
16 TABLET ORAL
Status: DISCONTINUED | OUTPATIENT
Start: 2022-03-05 | End: 2022-03-07 | Stop reason: HOSPADM

## 2022-03-05 RX ORDER — GLUCAGON 1 MG
1 KIT INJECTION
Status: DISCONTINUED | OUTPATIENT
Start: 2022-03-05 | End: 2022-03-07 | Stop reason: HOSPADM

## 2022-03-05 RX ORDER — METHYLPREDNISOLONE SOD SUCC 125 MG
125 VIAL (EA) INJECTION
Status: COMPLETED | OUTPATIENT
Start: 2022-03-05 | End: 2022-03-05

## 2022-03-05 RX ORDER — MAGNESIUM SULFATE HEPTAHYDRATE 40 MG/ML
2 INJECTION, SOLUTION INTRAVENOUS ONCE
Status: COMPLETED | OUTPATIENT
Start: 2022-03-05 | End: 2022-03-05

## 2022-03-05 RX ORDER — SODIUM CHLORIDE 0.9 % (FLUSH) 0.9 %
10 SYRINGE (ML) INJECTION EVERY 12 HOURS PRN
Status: DISCONTINUED | OUTPATIENT
Start: 2022-03-05 | End: 2022-03-07 | Stop reason: HOSPADM

## 2022-03-05 RX ADMIN — MAGNESIUM SULFATE IN WATER 2 G: 40 INJECTION, SOLUTION INTRAVENOUS at 09:03

## 2022-03-05 RX ADMIN — IPRATROPIUM BROMIDE AND ALBUTEROL SULFATE 3 ML: 2.5; .5 SOLUTION RESPIRATORY (INHALATION) at 03:03

## 2022-03-05 RX ADMIN — ALBUTEROL SULFATE 15 MG: 2.5 SOLUTION RESPIRATORY (INHALATION) at 05:03

## 2022-03-05 RX ADMIN — ACETAMINOPHEN 1000 MG: 500 TABLET ORAL at 05:03

## 2022-03-05 RX ADMIN — METHYLPREDNISOLONE SODIUM SUCCINATE 125 MG: 125 INJECTION, POWDER, FOR SOLUTION INTRAMUSCULAR; INTRAVENOUS at 04:03

## 2022-03-05 NOTE — ED PROVIDER NOTES
Encounter date: 3:18 PM 03/05/2022    Source of History   Patient and EMS    Chief Complaint   Pt presents with:   Shortness of Breath (Hx. Of COPD. Out of home albuterol treatments at home.  EMS reports he was 77% on RA. 4L NC, 97%. )      History Of Present Illness   Baltazar Mccray is a 61 y.o. male with History of COPD on 3 L of home oxygen, CAD, CHF, seizure history, AFib on coreg who presents to the ED with chief complaint of shortness of breath x3 days.  Patient states that he has been short of breath.  He was initially making it with his 3 L of oxygen and home breathing treatments.  He states that he ran out of his breathing treatments.  Over the last day he has began to become very short of breath.  He denies any chest pain but does note chest tightness.  He states that he has had a cough but has not been bringing up any sputum.  He denies any fever.  He denies any decreased urine output.  He denies any recent falls or trauma.  He states that he has been compliant with his medications until he ran out.  He states he is not on any blood thinners.  He denies PND, orthopnea.  He denies any nausea or vomiting.    This is the extent to the patients complaints today here in the emergency department.    Review Of Systems   As per HPI and below:  Positive for:  Shortness of breath  Constitutional: No fever.   HEENT: No voice change.   Eyes:  No visual disturbances. No eye pain.   Respiratory:  + shortness of breath. + wheezing. + cough.   Cardio:  No chest pain. No leg swelling. No palpitations.   GI:  No abdominal pain. No nausea or vomiting. No diarrhea.   :  No blood in urine. No difficulty urinating.   MSK:  No back pain. No neck pain.   Skin: No rash.   Neurologic: No headache. No dizziness.       Review of patient's allergies indicates:   Allergen Reactions    Benazepril Swelling    Duoneb [ipratropium-albuterol]      Report minor Tremors, pt seems to be tolerating well.       No current  facility-administered medications on file prior to encounter.     Current Outpatient Medications on File Prior to Encounter   Medication Sig Dispense Refill    acetaminophen (TYLENOL) 325 MG tablet Take 2 tablets (650 mg total) by mouth every 8 (eight) hours as needed for Pain or Temperature greater than (100.5).  0    albuterol (PROVENTIL/VENTOLIN HFA) 90 mcg/actuation inhaler Inhale 1-2 puffs into the lungs every 6 (six) hours as needed for Wheezing. Rescue 6.7 g 0    albuterol-ipratropium (DUO-NEB) 2.5 mg-0.5 mg/3 mL nebulizer solution Take 3 mLs by nebulization every 4 (four) hours as needed for Wheezing or Shortness of Breath. Rescue 180 mL 0    budesonide-glycopyr-formoterol (BREZTRI AEROSPHERE) 160-9-4.8 mcg/actuation HFAA Inhale 2 puffs into the lungs 2 (two) times daily. Rinse mouth after use. 10.7 g 0    carvediloL (COREG) 12.5 MG tablet Take 1 tablet (12.5 mg total) by mouth 2 (two) times daily with meals. 60 tablet 11    furosemide (LASIX) 20 MG tablet Take 1 tablet (20 mg total) by mouth once daily. 30 tablet 11    pantoprazole (PROTONIX) 40 MG tablet Take 1 tablet (40 mg total) by mouth 2 (two) times daily. 60 tablet 1    SPIRIVA WITH HANDIHALER 18 mcg inhalation capsule Inhale 1 capsule (18 mcg total) into the lungs once daily. 30 capsule 0       Past History   As per HPI and below:  Past Medical History:   Diagnosis Date    Asthma     Atrial fibrillation with rapid ventricular response     CHF (congestive heart failure)     COPD (chronic obstructive pulmonary disease)     home O2 at night only    Coronary artery disease     Hypertension     Lung nodule     Pneumonia     Seizures      Past Surgical History:   Procedure Laterality Date    ESOPHAGOGASTRODUODENOSCOPY N/A 2/11/2022    Procedure: EGD (ESOPHAGOGASTRODUODENOSCOPY);  Surgeon: Cain Ortega MD;  Location: [address];  Service: Endoscopy;  Laterality: N/A;    LEFT HEART CATHETERIZATION  4/23/2020    Procedure: Left  heart cath;  Surgeon: Nic Pollack MD;  Location: [facility] CATH LAB;  Service: Cardiology;;       Social History     Socioeconomic History    Marital status: Single   Tobacco Use    Smoking status: Current Some Day Smoker     Packs/day: 0.25     Types: Cigarettes    Smokeless tobacco: Never Used    Tobacco comment: 3-4 per day   Substance and Sexual Activity    Alcohol use: Yes     Alcohol/week: 2.0 standard drinks     Types: 2 Cans of beer per week     Comment: every night    Drug use: No    Sexual activity: Not Currently   Social History Narrative    Patient' nephew is currently living with him in his apartment. Patient's sister Viry ([phone]) helps manage patient's care.            6/3/21    Patient is no longer staying with family. Patient is currently staying at the [facility] and working on getting into their program for more supportive housing.      Social Determinants of Health     Financial Resource Strain: Low Risk     Difficulty of Paying Living Expenses: Not very hard   Food Insecurity: No Food Insecurity    Worried About Running Out of Food in the Last Year: Never true    Ran Out of Food in the Last Year: Never true   Transportation Needs: No Transportation Needs    Lack of Transportation (Medical): No    Lack of Transportation (Non-Medical): No   Physical Activity: Inactive    Days of Exercise per Week: 0 days    Minutes of Exercise per Session: 0 min   Stress: No Stress Concern Present    Feeling of Stress : Only a little   Social Connections: Unknown    Frequency of Communication with Friends and Family: More than three times a week    Frequency of Social Gatherings with Friends and Family: More than three times a week    Attends Caodaism Services: Never    Active Member of Clubs or Organizations: No    Attends Club or Organization Meetings: Never   Housing Stability: Unknown    Unable to Pay for Housing in the Last Year: No    Unstable Housing in the Last Year: No        Family History   Problem Relation Age of Onset    Cancer Father     Hypertension Sister     Stroke Sister     Stroke Brother     Diabetes Neg Hx     Heart disease Neg Hx        Physical Exam     Vitals:    03/05/22 1922 03/05/22 2121 03/05/22 2130 03/05/22 2138   BP: 137/77 (!) 140/74 129/75    BP Location:       Patient Position:       Pulse: 92 82 83    Resp: (!) 27 (!) 33 18    Temp:    99.2 °F (37.3 °C)   TempSrc:    Oral   SpO2: 99% (!) 94% 95%    Weight:         Physical Exam:   Nursing note and vitals reviewed.  Appearance:  Nontoxic, elderly male in acute respiratory distress .  Making purposeful movements.  Speaking in 1-2 word sentences.  Skin: No rashes seen.  Good turgor.  No abrasions.  No ecchymoses.  Eyes: No conjunctival injection. EOMI, PERRL.  Head: NC/AT  ENT: Oropharynx clear.    Chest:  Poor air movement bilaterally.  With diminished breath sounds.  Increased work a new breathing with belly breathing appreciated.  Diffuse expiratory wheezes appreciated.  bilateral chest rise.  Cardiovascular:  Tachycardic yet regular rhythm.  Normal equal bilateral radial pulses.  Abdomen: Soft.  Not distended.  Nontender.  No guarding.  No rebound. No Masses  Musculoskeletal: Good range of motion all joints.  No deformities.  Neck supple, full range of motion, no obvious deformity.  Neurologic: Moves all extremities.  Normal sensation.  No facial droop.  Normal speech.    Mental Status:  Alert and oriented x 3.  Appropriate, conversant.      Results and Medications    Procedures    Labs Reviewed   CBC W/ AUTO DIFFERENTIAL - Abnormal; Notable for the following components:       Result Value    Hemoglobin 13.1 (*)     MCH 26.6 (*)     MCHC 30.7 (*)     RDW 17.9 (*)     Gran # (ANC) 8.0 (*)     Immature Grans (Abs) 0.06 (*)     Gran % 73.5 (*)     Lymph % 14.4 (*)     All other components within normal limits   COMPREHENSIVE METABOLIC PANEL - Abnormal; Notable for the following components:    Chloride  93 (*)     CO2 30 (*)     Albumin 2.9 (*)     All other components within normal limits   TROPONIN I - Abnormal; Notable for the following components:    Troponin I 0.031 (*)     All other components within normal limits   TROPONIN I - Abnormal; Notable for the following components:    Troponin I 0.029 (*)     All other components within normal limits   ISTAT PROCEDURE - Abnormal; Notable for the following components:    POC PCO2 74.8 (*)     POC HCO3 42.0 (*)     POC SATURATED O2 87 (*)     POC TCO2 44 (*)     All other components within normal limits   ISTAT PROCEDURE - Abnormal; Notable for the following components:    POC PH 7.337 (*)     POC PCO2 80.3 (*)     POC PO2 85 (*)     POC HCO3 43.0 (*)     POC TCO2 45 (*)     All other components within normal limits   ISTAT PROCEDURE - Abnormal; Notable for the following components:    POC PH 7.321 (*)     POC PCO2 78.7 (*)     POC HCO3 40.7 (*)     POC SATURATED O2 72 (*)     POC TCO2 43 (*)     All other components within normal limits   B-TYPE NATRIURETIC PEPTIDE   SARS-COV-2 RDRP GENE    Narrative:     This test utilizes isothermal nucleic acid amplification   technology to detect the SARS-CoV-2 RdRp nucleic acid segment.   The analytical sensitivity (limit of detection) is 125 genome   equivalents/mL.   A POSITIVE result implies infection with the SARS-CoV-2 virus;   the patient is presumed to be contagious.     A NEGATIVE result means that SARS-CoV-2 nucleic acids are not   present above the limit of detection. A NEGATIVE result should be   treated as presumptive. It does not rule out the possibility of   COVID-19 and should not be the sole basis for treatment decisions.   If COVID-19 is strongly suspected based on clinical and exposure   history, re-testing using an alternate molecular assay should be   considered.   This test is only for use under the Food and Drug   Administration s Emergency Use Authorization (EUA).   Commercial kits are provided by [employer].   Performance characteristics of the EUA have been independently   verified by [facility] Department of   Pathology and Laboratory Medicine.   _________________________________________________________________   The authorized Fact Sheet for Healthcare Providers and the authorized Fact   Sheet for Patients of the ID NOW COVID-19 are available on the FDA   website:     https://www.fda.gov/media/570004/download  https://www.fda.gov/media/394992/download               Imaging Results          X-Ray Chest AP Portable (Final result)  Result time 03/05/22 15:58:51    Final result by Niki Watkins MD (03/05/22 15:58:51)                 Impression:      No acute cardiopulmonary disease.      Electronically signed by: Niki Watkins  Date:    03/05/2022  Time:    15:58             Narrative:    EXAMINATION:  XR CHEST AP PORTABLE    CLINICAL HISTORY:  CHF;    TECHNIQUE:  Single frontal view of the chest was performed.    COMPARISON:  02/21/2022    FINDINGS:  The lungs are clear.  No pleural effusion.  Heart size is within normal limits.  The aorta is calcified.                                    Medications   pantoprazole EC tablet 40 mg (has no administration in time range)   furosemide tablet 20 mg (has no administration in time range)   carvediloL tablet 12.5 mg (has no administration in time range)   albuterol-ipratropium 2.5 mg-0.5 mg/3 mL nebulizer solution 3 mL (has no administration in time range)   albuterol inhaler 2 puff (has no administration in time range)   sodium chloride 0.9% flush 10 mL (has no administration in time range)   naloxone 0.4 mg/mL injection 0.02 mg (has no administration in time range)   glucose chewable tablet 16 g (has no administration in time range)   glucose chewable tablet 24 g (has no administration in time range)   dextrose 10% bolus 125 mL (has no administration in time range)   dextrose 10% bolus 250 mL (has no administration in time range)   glucagon  (human recombinant) injection 1 mg (has no administration in time range)   enoxaparin injection 40 mg (has no administration in time range)   acetaminophen tablet 650 mg (has no administration in time range)   nicotine 21 mg/24 hr 1 patch (has no administration in time range)   polyethylene glycol packet 17 g (has no administration in time range)   predniSONE tablet 40 mg (has no administration in time range)   albuterol-ipratropium 2.5 mg-0.5 mg/3 mL nebulizer solution 3 mL (3 mLs Nebulization Given 3/5/22 1540)   methylPREDNISolone sodium succinate injection 125 mg (125 mg Intravenous Given 3/5/22 1646)   albuterol sulfate nebulizer solution 15 mg (15 mg Nebulization Given 3/5/22 1759)   acetaminophen tablet 1,000 mg (1,000 mg Oral Given 3/5/22 1751)   magnesium sulfate 2g in water 50mL IVPB (premix) (0 g Intravenous Stopped 3/5/22 2237)       MDM, Impression and Plan   Previous Records:  I decided to obtain old medical records which showed:  History of COPD on 3-4 L of oxygen.    Initial Assessment:   Urgent evaluation of 61 y.o. male with history as above who presents the ED with chief complaint of shortness of breath times 2-3 days.  On arrival he is noted to be in acute severe respiratory distress thus he was started on a DuoNebs and BiPAP due to his history of COPD.  At home he was satting low 80s on his 3-4 L of oxygen per nasal cannula.  Differential Diagnosis:   -CHF  -COPD  -PNA  -ACS  -Anemia  -DKA  -Pulmonary Embolism- thought was given to this diagnosis yet there are much more likely diagnosis is will continue to treat as a COPD exacerbation  -Electrolyte abnormalities    Independently Interpreted Test(s):   EKG:  I independently reviewed and interpreted the EKG and my findings are as follows:   Please see ED course.  EKG shows sinus tachycardia with ventricular rate of 108 beats per minute.  Narrow QRS.  No ST segment elevations or depressions.  No STEMI.  When compared to previous EKG it is faster  but same morphology   IMAGING:  I have ordered and independently interpreted X-rays and my findings are as follow:  Please see ED course.  Chest x-ray shows hyperinflated lungs consistent with COPD.  No pleural effusions pneumothorax or consolidation pointing towards a pneumonia.    Clinical Tests:   Lab Tests: Ordered and Reviewed  Radiological Study: Ordered and Reviewed  Medical Tests: Ordered and Reviewed    ED Management:    Patient was given DuoNebs x3 started on BiPAP and given a continuous albuterol treatment as well as magnesium, methylprednisolone for his COPD exacerbation.  Chest x-ray shows no focal consolidations pointing towards an pneumonia.  He was given a g of Tylenol with subsequent reduction in his headache.  COVID negative.  BMP within normal limits.  Creatinine near baseline with no gross electrolyte abnormalities.  Hemoglobin above baseline.  No leukocytosis.  I called and discussed the case with Critical Care Medicine as he was on BiPAP.  They were able to titrate his BiPAP down in repeat VBG without significant changes.  He appears to be doing well on room air.  I called and discussed the case with Hospital Medicine for admission and they evaluated the patient and deemed him stable for admission.  Patient updated on plans for admission.  Patient deemed stable for admission to hospital medicine.  Please see ED course for discussion of labs.     I called and discussed the case with:  Critical care medicine/hospital medicine         Final diagnoses:  [J44.1] COPD exacerbation          ED Disposition Condition    Admit              ED Course as of 03/05/22 2305   Sat Mar 05, 2022   1534 EKG 12-lead  EKG, independently interpreted, sinus tachycardia at 108 with no ischemic changes, normal axis, intervals are unremarkable, some motion artifact. [BA]   1642 POC PCO2(!): 74.8 [HM]   1723 Will trial patient off of BiPAP [HM]   1858 Called ICU team.  They are currently doing sign-out.  They state  called back at 15 minutes. []   1910 POC PO2(!!): 85  Patient placed back on BiPAP. []   1928 Spoke with ICU who states they will see the pt    []      ED Course User Index  [BA] Ari Clifton MD  [] MD Renee Herman MD   Emergency Resident      Renee Rooney MD  Resident  03/05/22 5564

## 2022-03-05 NOTE — ED NOTES
LOC: The patient is awake, alert, and oriented to self, place, time, and situation. Pt is calm and cooperative. Affect is appropriate.  Speech is appropriate and clear.     APPEARANCE: Patient in no acute distress.  Patient is clean and well groomed.    SKIN: The skin is warm and dry; color consistent with ethnicity.  Patient has normal skin turgor and moist mucus membranes.  Skin intact; no breakdown or bruising noted.     MUSCULOSKELETAL: Patient moving upper and lower extremities without difficulty; denies pain in the extremities or back.  Denies weakness.     RESPIRATORY: Airway is open and patent. Patient tachypneic. Patient in tripod position. Denies cough.     CARDIAC:  Normal rate noted.  No peripheral edema noted. No complaints of chest pain.      ABDOMEN: Soft and non tender to palpation.  No distention noted. Pt denies abdominal pain; denies nausea, vomiting, diarrhea, or constipation.    NEUROLOGIC: Eyes open spontaneously.  Behavior appropriate to situation.  Follows commands; facial expression symmetrical.  Purposeful motor response noted; normal sensation in all extremities. Pt denies headache; denies lightheadedness or dizziness; denies visual disturbances; denies loss of balance; denies unilateral weakness.

## 2022-03-06 PROBLEM — J96.02 ACUTE HYPERCAPNIC RESPIRATORY FAILURE: Status: ACTIVE | Noted: 2022-03-06

## 2022-03-06 LAB
ALBUMIN SERPL BCP-MCNC: 2.9 G/DL (ref 3.5–5.2)
ALP SERPL-CCNC: 78 U/L (ref 55–135)
ALT SERPL W/O P-5'-P-CCNC: 17 U/L (ref 10–44)
ANION GAP SERPL CALC-SCNC: 9 MMOL/L (ref 8–16)
AST SERPL-CCNC: 12 U/L (ref 10–40)
BASOPHILS # BLD AUTO: 0.01 K/UL (ref 0–0.2)
BASOPHILS NFR BLD: 0.1 % (ref 0–1.9)
BILIRUB SERPL-MCNC: 0.4 MG/DL (ref 0.1–1)
BUN SERPL-MCNC: 15 MG/DL (ref 6–20)
CALCIUM SERPL-MCNC: 9.8 MG/DL (ref 8.7–10.5)
CHLORIDE SERPL-SCNC: 91 MMOL/L (ref 95–110)
CO2 SERPL-SCNC: 33 MMOL/L (ref 23–29)
CREAT SERPL-MCNC: 0.8 MG/DL (ref 0.5–1.4)
DIFFERENTIAL METHOD: ABNORMAL
EOSINOPHIL # BLD AUTO: 0 K/UL (ref 0–0.5)
EOSINOPHIL NFR BLD: 0 % (ref 0–8)
ERYTHROCYTE [DISTWIDTH] IN BLOOD BY AUTOMATED COUNT: 17.3 % (ref 11.5–14.5)
EST. GFR  (AFRICAN AMERICAN): >60 ML/MIN/1.73 M^2
EST. GFR  (NON AFRICAN AMERICAN): >60 ML/MIN/1.73 M^2
GLUCOSE SERPL-MCNC: 264 MG/DL (ref 70–110)
HCT VFR BLD AUTO: 39.9 % (ref 40–54)
HGB BLD-MCNC: 12.4 G/DL (ref 14–18)
IMM GRANULOCYTES # BLD AUTO: 0.03 K/UL (ref 0–0.04)
IMM GRANULOCYTES NFR BLD AUTO: 0.4 % (ref 0–0.5)
LYMPHOCYTES # BLD AUTO: 0.5 K/UL (ref 1–4.8)
LYMPHOCYTES NFR BLD: 6.5 % (ref 18–48)
MAGNESIUM SERPL-MCNC: 2.3 MG/DL (ref 1.6–2.6)
MCH RBC QN AUTO: 26.4 PG (ref 27–31)
MCHC RBC AUTO-ENTMCNC: 31.1 G/DL (ref 32–36)
MCV RBC AUTO: 85 FL (ref 82–98)
MONOCYTES # BLD AUTO: 0.1 K/UL (ref 0.3–1)
MONOCYTES NFR BLD: 1 % (ref 4–15)
NEUTROPHILS # BLD AUTO: 6.5 K/UL (ref 1.8–7.7)
NEUTROPHILS NFR BLD: 92 % (ref 38–73)
NRBC BLD-RTO: 0 /100 WBC
PHOSPHATE SERPL-MCNC: 2.3 MG/DL (ref 2.7–4.5)
PLATELET # BLD AUTO: 263 K/UL (ref 150–450)
PMV BLD AUTO: 11.3 FL (ref 9.2–12.9)
POCT GLUCOSE: 158 MG/DL (ref 70–110)
POCT GLUCOSE: 172 MG/DL (ref 70–110)
POCT GLUCOSE: 201 MG/DL (ref 70–110)
POTASSIUM SERPL-SCNC: 4.7 MMOL/L (ref 3.5–5.1)
PROCALCITONIN SERPL IA-MCNC: 0.1 NG/ML
PROT SERPL-MCNC: 6.9 G/DL (ref 6–8.4)
RBC # BLD AUTO: 4.7 M/UL (ref 4.6–6.2)
SODIUM SERPL-SCNC: 133 MMOL/L (ref 136–145)
WBC # BLD AUTO: 7.03 K/UL (ref 3.9–12.7)

## 2022-03-06 PROCEDURE — 25000003 PHARM REV CODE 250

## 2022-03-06 PROCEDURE — 97161 PT EVAL LOW COMPLEX 20 MIN: CPT

## 2022-03-06 PROCEDURE — 80053 COMPREHEN METABOLIC PANEL: CPT

## 2022-03-06 PROCEDURE — 94640 AIRWAY INHALATION TREATMENT: CPT

## 2022-03-06 PROCEDURE — 83735 ASSAY OF MAGNESIUM: CPT

## 2022-03-06 PROCEDURE — 99900035 HC TECH TIME PER 15 MIN (STAT)

## 2022-03-06 PROCEDURE — 97530 THERAPEUTIC ACTIVITIES: CPT

## 2022-03-06 PROCEDURE — 63600175 PHARM REV CODE 636 W HCPCS

## 2022-03-06 PROCEDURE — S4991 NICOTINE PATCH NONLEGEND: HCPCS

## 2022-03-06 PROCEDURE — 85025 COMPLETE CBC W/AUTO DIFF WBC: CPT

## 2022-03-06 PROCEDURE — 99291 PR CRITICAL CARE, E/M 30-74 MINUTES: ICD-10-PCS | Mod: ,,, | Performed by: NURSE PRACTITIONER

## 2022-03-06 PROCEDURE — 99223 1ST HOSP IP/OBS HIGH 75: CPT | Mod: ,,, | Performed by: HOSPITALIST

## 2022-03-06 PROCEDURE — 99223 PR INITIAL HOSPITAL CARE,LEVL III: ICD-10-PCS | Mod: ,,, | Performed by: HOSPITALIST

## 2022-03-06 PROCEDURE — 97535 SELF CARE MNGMENT TRAINING: CPT

## 2022-03-06 PROCEDURE — 84100 ASSAY OF PHOSPHORUS: CPT

## 2022-03-06 PROCEDURE — 94761 N-INVAS EAR/PLS OXIMETRY MLT: CPT

## 2022-03-06 PROCEDURE — 84145 PROCALCITONIN (PCT): CPT

## 2022-03-06 PROCEDURE — 25000003 PHARM REV CODE 250: Performed by: STUDENT IN AN ORGANIZED HEALTH CARE EDUCATION/TRAINING PROGRAM

## 2022-03-06 PROCEDURE — 27000221 HC OXYGEN, UP TO 24 HOURS

## 2022-03-06 PROCEDURE — 97165 OT EVAL LOW COMPLEX 30 MIN: CPT

## 2022-03-06 PROCEDURE — 20600001 HC STEP DOWN PRIVATE ROOM

## 2022-03-06 PROCEDURE — 99291 CRITICAL CARE FIRST HOUR: CPT | Mod: ,,, | Performed by: NURSE PRACTITIONER

## 2022-03-06 PROCEDURE — 25000242 PHARM REV CODE 250 ALT 637 W/ HCPCS

## 2022-03-06 PROCEDURE — 36415 COLL VENOUS BLD VENIPUNCTURE: CPT

## 2022-03-06 RX ORDER — BISMUTH SUBSALICYLATE 525 MG/30ML
30 LIQUID ORAL EVERY 6 HOURS PRN
Status: DISCONTINUED | OUTPATIENT
Start: 2022-03-06 | End: 2022-03-07 | Stop reason: HOSPADM

## 2022-03-06 RX ORDER — IBUPROFEN 200 MG
1 TABLET ORAL DAILY
Status: DISCONTINUED | OUTPATIENT
Start: 2022-03-06 | End: 2022-03-07 | Stop reason: HOSPADM

## 2022-03-06 RX ORDER — SODIUM,POTASSIUM PHOSPHATES 280-250MG
2 POWDER IN PACKET (EA) ORAL
Status: DISCONTINUED | OUTPATIENT
Start: 2022-03-06 | End: 2022-03-06

## 2022-03-06 RX ADMIN — PANTOPRAZOLE SODIUM 40 MG: 40 TABLET, DELAYED RELEASE ORAL at 02:03

## 2022-03-06 RX ADMIN — ACETAMINOPHEN 650 MG: 325 TABLET ORAL at 03:03

## 2022-03-06 RX ADMIN — DIBASIC SODIUM PHOSPHATE, MONOBASIC POTASSIUM PHOSPHATE AND MONOBASIC SODIUM PHOSPHATE 1 TABLET: 852; 155; 130 TABLET ORAL at 12:03

## 2022-03-06 RX ADMIN — BISMUTH SUBSALICYLATE 30 ML: 525 SUSPENSION ORAL at 03:03

## 2022-03-06 RX ADMIN — ENOXAPARIN SODIUM 40 MG: 100 INJECTION SUBCUTANEOUS at 04:03

## 2022-03-06 RX ADMIN — PANTOPRAZOLE SODIUM 40 MG: 40 TABLET, DELAYED RELEASE ORAL at 08:03

## 2022-03-06 RX ADMIN — PREDNISONE 40 MG: 20 TABLET ORAL at 08:03

## 2022-03-06 RX ADMIN — Medication 1 PATCH: at 02:03

## 2022-03-06 RX ADMIN — CARVEDILOL 12.5 MG: 12.5 TABLET, FILM COATED ORAL at 04:03

## 2022-03-06 RX ADMIN — DIBASIC SODIUM PHOSPHATE, MONOBASIC POTASSIUM PHOSPHATE AND MONOBASIC SODIUM PHOSPHATE 1 TABLET: 852; 155; 130 TABLET ORAL at 04:03

## 2022-03-06 RX ADMIN — POLYETHYLENE GLYCOL 3350 17 G: 17 POWDER, FOR SOLUTION ORAL at 08:03

## 2022-03-06 RX ADMIN — FUROSEMIDE 20 MG: 20 TABLET ORAL at 08:03

## 2022-03-06 RX ADMIN — IPRATROPIUM BROMIDE AND ALBUTEROL SULFATE 3 ML: 2.5; .5 SOLUTION RESPIRATORY (INHALATION) at 12:03

## 2022-03-06 RX ADMIN — IPRATROPIUM BROMIDE AND ALBUTEROL SULFATE 3 ML: 2.5; .5 SOLUTION RESPIRATORY (INHALATION) at 03:03

## 2022-03-06 RX ADMIN — CARVEDILOL 12.5 MG: 12.5 TABLET, FILM COATED ORAL at 08:03

## 2022-03-06 RX ADMIN — DIBASIC SODIUM PHOSPHATE, MONOBASIC POTASSIUM PHOSPHATE AND MONOBASIC SODIUM PHOSPHATE 1 TABLET: 852; 155; 130 TABLET ORAL at 08:03

## 2022-03-06 RX ADMIN — IPRATROPIUM BROMIDE AND ALBUTEROL SULFATE 3 ML: 2.5; .5 SOLUTION RESPIRATORY (INHALATION) at 07:03

## 2022-03-06 RX ADMIN — ENOXAPARIN SODIUM 40 MG: 100 INJECTION SUBCUTANEOUS at 02:03

## 2022-03-06 NOTE — H&P
[facility] - Telemetry [facility] Medicine  History & Physical    Patient Name: Baltazar Mccray  MRN: [id number]  Patient Class: IP- Inpatient  Admission Date: 3/5/2022  Attending Physician: Michael Galindo MD   Primary Care Provider: [facility]    Patient information was obtained from patient and ER records.     Subjective:     Principal Problem:COPD exacerbation    Chief Complaint:   Chief Complaint   Patient presents with    Shortness of Breath     Hx. Of COPD. Out of home albuterol treatments at home.  EMS reports he was 77% on RA. 4L NC, 97%.         HPI: 61 year old male with COPD on 3L home O2, HTN, HFpEF (EF 55% 2022), CAD, HTN, AF, and remote seizure hx presenting with 3 days of worsening dyspnea. Reports dyspnea started 3 days ago and has gradually worsened despite regular inhaler use. Has a recent ED presentation for the same on 2/21 and was given a refill of his inhalers as well as a 5 day course of prednisone which he completed. States he ran out of his inhalers about 1.5 days ago. Noticed his oxygen saturations were declining at home and when his dyspnea became worse this morning, he called EMS. Has a chronic dry cough that hasn't changed recently - denies sputum production. Denies fevers or rhinorrhea. Denies sick contacts or recent travel. Denies abdominal pain, nausea, vomiting, diarrhea, or bloody stools. Denies hematemesis. Denies dysuria or hematuria. Denies weight gain or LL swelling. Lives alone at home but states his brother visits him every other day and takes him grocery shopping, to the pharmacy, doctor's appointments, etc.     On arrival to ED, hypertensive /92, tachycardiac 108, RR 26 saturating 97% on 4L NC. Required period of BiPAP in ED. Given Mg, albuterol nebs, and methylprednisone in ED. On review, BP had normalized, saturating 96% on 4L NC. No respiratory distress or accessory muscle use. Able to speak in full sentences. Had mild expiratory wheeze BL  upper lobes and rales at BL mid and lower zones. Clinically euvolemic, no JVD or LL edema. Labs showing no leukocytosis, Hb improved from baseline. Electrolytes WNL. BNP 29, troponins elevated but flat. ECG showing sinus tachycardia, no ischemic changes. CXR showing hyperinflated lungs without obvious consolidation or overt pulmonary edema. Admitted to hospital medicine for management of COPD exacerbation.       Past Medical History:   Diagnosis Date    Asthma     Atrial fibrillation with rapid ventricular response     CHF (congestive heart failure)     COPD (chronic obstructive pulmonary disease)     home O2 at night only    Coronary artery disease     Hypertension     Lung nodule     Pneumonia     Seizures      Past Surgical History:   Procedure Laterality Date    ESOPHAGOGASTRODUODENOSCOPY N/A 2/11/2022    Procedure: EGD (ESOPHAGOGASTRODUODENOSCOPY);  Surgeon: Cain Ortega MD;  Location: [facility] ENDO ([address] [place] [address]);  Service: Endoscopy;  Laterality: N/A;    LEFT HEART CATHETERIZATION  4/23/2020    Procedure: Left heart cath;  Surgeon: Nic Pollack MD;  Location: [facility] CATH LAB;  Service: Cardiology;;     Review of patient's allergies indicates:   Allergen Reactions    Benazepril Swelling    Duoneb [ipratropium-albuterol]      Report minor Tremors, pt seems to be tolerating well.     No current facility-administered medications on file prior to encounter.     Current Outpatient Medications on File Prior to Encounter   Medication Sig    acetaminophen (TYLENOL) 325 MG tablet Take 2 tablets (650 mg total) by mouth every 8 (eight) hours as needed for Pain or Temperature greater than (100.5).    albuterol (PROVENTIL/VENTOLIN HFA) 90 mcg/actuation inhaler Inhale 1-2 puffs into the lungs every 6 (six) hours as needed for Wheezing. Rescue    albuterol-ipratropium (DUO-NEB) 2.5 mg-0.5 mg/3 mL nebulizer solution Take 3 mLs by nebulization every 4 (four) hours as needed for Wheezing or Shortness of Breath.  Rescue    budesonide-glycopyr-formoterol (BREZTRI AEROSPHERE) 160-9-4.8 mcg/actuation HFAA Inhale 2 puffs into the lungs 2 (two) times daily. Rinse mouth after use.    carvediloL (COREG) 12.5 MG tablet Take 1 tablet (12.5 mg total) by mouth 2 (two) times daily with meals.    furosemide (LASIX) 20 MG tablet Take 1 tablet (20 mg total) by mouth once daily.    pantoprazole (PROTONIX) 40 MG tablet Take 1 tablet (40 mg total) by mouth 2 (two) times daily.    SPIRIVA WITH HANDIHALER 18 mcg inhalation capsule Inhale 1 capsule (18 mcg total) into the lungs once daily.     Family History       Problem Relation (Age of Onset)    Cancer Father    Hypertension Sister    Stroke Sister, Brother          Tobacco Use    Smoking status: Current Some Day Smoker     Packs/day: 0.25     Types: Cigarettes    Smokeless tobacco: Never Used    Tobacco comment: 3-4 per day   Substance and Sexual Activity    Alcohol use: Yes     Alcohol/week: 2.0 standard drinks     Types: 2 Cans of beer per week     Comment: every night    Drug use: No    Sexual activity: Not Currently     Review of Systems   Constitutional:  Negative for fever.   HENT:  Negative for congestion, rhinorrhea, sneezing and sore throat.    Eyes:  Negative for photophobia and discharge.   Respiratory:  Positive for cough (dry), shortness of breath and wheezing.    Gastrointestinal:  Negative for abdominal pain, blood in stool, diarrhea, nausea and vomiting.   Genitourinary:  Negative for dysuria and hematuria.   Musculoskeletal:  Negative for arthralgias and myalgias.   Neurological:  Negative for dizziness, light-headedness and headaches.   Psychiatric/Behavioral:  Negative for agitation and behavioral problems.    Objective:     Vital Signs (Most Recent):  Temp: 99.2 °F (37.3 °C) (03/05/22 2138)  Pulse: 83 (03/05/22 2130)  Resp: 18 (03/05/22 2130)  BP: 129/75 (03/05/22 2130)  SpO2: 95 % (03/05/22 2130) Vital Signs (24h Range):  Temp:  [98.7 °F (37.1 °C)-99.2 °F  (37.3 °C)] 99.2 °F (37.3 °C)  Pulse:  [] 83  Resp:  [18-50] 18  SpO2:  [88 %-99 %] 95 %  BP: (129-200)/(74-92) 129/75     Weight: 64 kg (141 lb)  Body mass index is 21.44 kg/m².    Physical Exam  Vitals and nursing note reviewed.   Constitutional:       General: He is not in acute distress.  HENT:      Head: Normocephalic and atraumatic.      Mouth/Throat:      Mouth: Mucous membranes are moist.   Eyes:      Extraocular Movements: Extraocular movements intact.      Pupils: Pupils are equal, round, and reactive to light.   Cardiovascular:      Rate and Rhythm: Normal rate and regular rhythm.      Pulses: Normal pulses.      Heart sounds: Normal heart sounds. No murmur heard.  Pulmonary:      Effort: No tachypnea or accessory muscle usage.      Breath sounds: Examination of the right-upper field reveals wheezing. Examination of the left-upper field reveals wheezing. Examination of the right-middle field reveals rales. Examination of the left-middle field reveals rales. Examination of the right-lower field reveals rales. Examination of the left-lower field reveals rales. Wheezing (expiratory wheeze upper lobe) and rales present.   Abdominal:      General: Bowel sounds are normal.      Palpations: Abdomen is soft.      Tenderness: There is no abdominal tenderness.   Musculoskeletal:         General: No tenderness.      Right lower leg: No edema.      Left lower leg: No edema.   Skin:     General: Skin is warm.      Capillary Refill: Capillary refill takes less than 2 seconds.      Findings: No erythema or rash.   Neurological:      General: No focal deficit present.      Mental Status: He is alert and oriented to person, place, and time.   Psychiatric:         Mood and Affect: Mood normal.         Thought Content: Thought content normal.         CRANIAL NERVES     CN III, IV, VI   Pupils are equal, round, and reactive to light.     Significant Labs: All pertinent labs within the past 24 hours have been  reviewed.  CBC:   Recent Labs   Lab 03/05/22  1619 03/05/22  1624   WBC 10.92  --    HGB 13.1*  --    HCT 42.7 42     --      CMP:   Recent Labs   Lab 03/05/22  1619      K 4.1   CL 93*   CO2 30*   GLU 81   BUN 9   CREATININE 0.7   CALCIUM 9.6   PROT 6.8   ALBUMIN 2.9*   BILITOT 0.7   ALKPHOS 77   AST 14   ALT 18   ANIONGAP 16   EGFRNONAA >60.0     Cardiac Markers:   Recent Labs   Lab 03/05/22  1619   BNP 93     Troponin:   Recent Labs   Lab 03/05/22  1619 03/05/22  2108   TROPONINI 0.031* 0.029*       Significant Imaging: I have reviewed all imaging    Assessment/Plan:     * COPD exacerbation  Has COPD, on 3L home O2  Uses albuterol, spiriva, and breti at home  Had recent ED presentation for worsening dyspnea on 2/12 - given 5 days of prednisone and refills for his inhalers    Reports 3 days of worsening dyspnea despite regular inhaler use  Has a chronic, dry cough; denies changes to quality or quantity of cough  Denies fevers or sick contacts  Given IV Mg, methlyprednisone, and duonebs in ED and was on continuous BiPAP for a period of time  Reports improvement in dyspnea  Currently saturating 96% on 4L NC; no resp distress or accessory muscle use; BL rales in mid and lower zones, mild expiratory wheeze BL upper zones  No leukocytosis, no consolidation on CXR    Plan:  Prednisone 40mg daily for 4 days  No role for antibiotics at this time as no fevers or change to his cough  Daily CBC, CMP, Mg, Phos  Procal with morning labs  Continue O2 supplementation  Duonebs q6h  Albuterol PRN  BiPAP QHS    Gastric ulcer  Had EGD on 2/11/2022 which showed a non-bleeding gastric ulcer  Started on pantoprazole 40mg BID  Due for repeat EGD in April to assess for healing    Plan:  Continue pantoprazole 40mg BID        Atrial fibrillation  H/o AF  On carvedilol for rate control  Denies current or previous anticoagulation use    Continue coreg 12.5mg BID        Chronic combined systolic and diastolic congestive heart  failure  Has HFpEF EF 55% on TTE completed in Jan 2022  Takes lasix 20mg daily - reports compliance at home  Clinically appears euvolemic; BNP not elevated, no evidence of pulmonary edema on CXR    Plan:  Continue home lasix 20 mg daily  Monitor urine output   Cardiac diet      Tobacco abuse  Pt reports smoking 1-2 cigarettes per day  Does not smoke while wearing his oxygen  Pt motivated to quit smoking    Plan:  Smoking cessation education  Nicotine patches       Elevated troponin  Presenting with 3 days of worsening dyspnea  Denies chest pain  Troponins elevated but flat 0.031 --> 0.029  ECG - sinus tachycardia, no ischemic changes    Plan:  Troponins and EKG PRN for chest pain      Hx of seizure disorder  Remote h/o seizure disorder  States his seizures started after a TBI  Has not had a seizure in decades and has not been on anti-epileptics since the 90s       Essential hypertension  Hypertensive on arrival; normalized without intervention  Currently normotensive  Continue home coreg      VTE Risk Mitigation (From admission, onward)         Ordered     enoxaparin injection 40 mg  Daily         03/05/22 2242     IP VTE HIGH RISK PATIENT  Once         03/05/22 2242     Place sequential compression device  Until discontinued         03/05/22 2242                 Ana Navarrete MD  Department of Hospital Medicine   [facility] - Telemetry Stepdown

## 2022-03-06 NOTE — ASSESSMENT & PLAN NOTE
Had EGD on 2/11/2022 which showed a non-bleeding gastric ulcer  Started on pantoprazole 40mg BID  Due for repeat EGD in April to assess for healing    Plan:  Continue pantoprazole 40mg BID

## 2022-03-06 NOTE — ASSESSMENT & PLAN NOTE
Pt reports smoking 1-2 cigarettes per day  Does not smoke while wearing his oxygen  Pt motivated to quit smoking    Plan:  Smoking cessation education  Nicotine patches

## 2022-03-06 NOTE — HPI
Baltazar Mccray is a 61 y.o. male with History of COPD on 3 L of home oxygen, CAD, CHF, seizure history, AFib on coreg who presents to the ED with chief complaint of shortness of breath x3 days.  Patient states that he has been short of breath.  He was initially making it with his 3 L of oxygen and home breathing treatments.  He states that he ran out of his breathing treatments.  Over the last day he has began to become very short of breath.  He denies any chest pain but does note chest tightness.  He states that he has had a cough but has not been bringing up any sputum.  He denies any fever.  He denies any decreased urine output.  He denies any recent falls or trauma.  He states that he has been compliant with his medications until he ran out.  He states he is not on any blood thinners.  He denies PND, orthopnea.  He denies any nausea or vomiting. He smokes about 2 cigarettes daily.

## 2022-03-06 NOTE — PT/OT/SLP EVAL
Occupational Therapy   Evaluation and Discharge Note    Name: Baltazar Mccray  MRN: [id number]  Admitting Diagnosis:  COPD exacerbation  Recent Surgery: * No surgery found *      Recommendations:     Discharge Recommendations: home  Discharge Equipment Recommendations:  shower chair  Barriers to discharge:  Decreased caregiver support    Assessment:     Baltazar Mccray is a 61 y.o. male with a medical diagnosis of COPD exacerbation.  He was dressed upon arrival and reported being able to perform all ADL tasks independently but requires continuous oxygen (4L currently). The patient was able to perform LE dressing independently, T/F sit <> stand independently, ambulated to the bathroom independently and groomed independently but became SOB after tasks were complete. His O2 sat dropped to 79-80 and improved with breathing exercises and when the patient T/F sit > supine independently. He is currently at his baseline LOF and does not require further OT services. Performance deficits affecting function: impaired endurance, impaired self care skills.      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  Patient found supine with oxygen, pulse ox (continuous), blood pressure cuff upon OT entry to room.    General Precautions: Standard, fall   Orthopedic Precautions:N/A   Braces: N/A  Respiratory Status: Nasal cannula, flow 4 L/min    Occupational Performance:    Bed Mobility:    · Sitting EOB with independence   · Sit > supine with independence     Functional Mobility/Transfers:  · Transfers:   - Sit <> stand from EOB with independence with no assistive device    · Functional Mobility:   - Ambulated to bathroom <> bed with independence with no assistive device   - Stood in place with independence with no assistive device     Activities of Daily Living:  · Dressing:   -  Lower Body Dressing: donned/doffed socks while sitting EOB with independence with no assistive device   · Grooming:   - Brushed teeth, washed hands and wiped counter while standing at sink with independence and no assistive device    Cognitive/Visual Perceptual:  Cognitive/Psychosocial Skills:     -       Oriented to: Person, Place, Time and Situation     Physical Exam:  Upper Extremity Range of Motion:     -       Right Upper Extremity: WNL  -       Left Upper Extremity: WNL  Upper Extremity Strength:    -       Right Upper Extremity: WNL  -       Left Upper Extremity: WNL   Strength:    -       Right Upper Extremity: WNL  -       Left Upper Extremity: WNL    AMPAC 6 Click ADL:  AMPAC Total Score: 24    Treatment & Education:  - Recommended shower chair for patient and educated on energy conservation techniques for ADLs at home   - Patient demonstrated deep breathing exercises    Education:    Patient left supine with all lines intact and call button in reach    GOALS:   Multidisciplinary Problems     Occupational Therapy Goals     Not on file          Multidisciplinary Problems (Resolved)        Problem: Occupational Therapy Goal    Goal Priority Disciplines Outcome Interventions   Occupational Therapy Goal   (Resolved)      OT, PT/OT Met                    History:     Past Medical History:   Diagnosis Date    Asthma     Atrial fibrillation with rapid ventricular response     CHF (congestive heart failure)     COPD (chronic obstructive pulmonary disease)     home O2 at night only    Coronary artery disease     Hypertension     Lung nodule     Pneumonia     Seizures        Past Surgical History:   Procedure Laterality Date    ESOPHAGOGASTRODUODENOSCOPY N/A 2/11/2022    Procedure: EGD (ESOPHAGOGASTRODUODENOSCOPY);  Surgeon: Cain Ortega MD;  Location: [facility] ENDO ([address] [place] [address]);  Service: Endoscopy;  Laterality: N/A;    LEFT HEART CATHETERIZATION  4/23/2020    Procedure: Left heart cath;  Surgeon: Nic Pollack MD;  Location: [facility] CATH LAB;  Service: Cardiology;;       Time Tracking:     OT Date of Treatment: 03/06/22  OT Start Time: 0858  OT Stop Time: 0920  OT Total Time (min): 22 min    Billable Minutes:Evaluation 8  Self Care/Home Management 14    3/6/2022

## 2022-03-06 NOTE — ASSESSMENT & PLAN NOTE
Has COPD, on 3L home O2  Uses albuterol, spiriva, and breti at home  Had recent ED presentation for worsening dyspnea on 2/12 - given 5 days of prednisone and refills for his inhalers    Reports 3 days of worsening dyspnea despite regular inhaler use  Has a chronic, dry cough; denies changes to quality or quantity of cough  Denies fevers or sick contacts  Given IV Mg, methlyprednisone, and duonebs in ED and was on continuous BiPAP for a period of time  Reports improvement in dyspnea  Currently saturating 96% on 4L NC; no resp distress or accessory muscle use; BL rales in mid and lower zones, mild expiratory wheeze BL upper zones  No leukocytosis, no consolidation on CXR    Plan:  Prednisone 40mg daily for 4 days  No role for antibiotics at this time as no fevers or change to his cough  Daily CBC, CMP, Mg, Phos  Procal with morning labs  Continue O2 supplementation  Duonebs q6h  Albuterol PRN  BiPAP QHS

## 2022-03-06 NOTE — CONSULTS
[facility] - Telemetry Stepdown  Critical Care Medicine  Consult Note    Patient Name: Baltazar Mccray  MRN: [id number]  Admission Date: 3/5/2022  Hospital Length of Stay: 1 days  Code Status: Full Code  Attending Physician: Michael Galindo MD   Primary Care Provider: Rhonda Of Katia   Principal Problem: COPD exacerbation    Inpatient consult to Critical Care Medicine  Consult performed by: Saida Reveles NP  Consult ordered by: Renee Rooney MD        Subjective:     HPI:  Baltazar Mccray is a 61 y.o. male with History of COPD on 3 L of home oxygen, CAD, CHF, seizure history, AFib on coreg who presents to the ED with chief complaint of shortness of breath x3 days.  Patient states that he has been short of breath.  He was initially making it with his 3 L of oxygen and home breathing treatments.  He states that he ran out of his breathing treatments.  Over the last day he has began to become very short of breath.  He denies any chest pain but does note chest tightness.  He states that he has had a cough but has not been bringing up any sputum.  He denies any fever.  He denies any decreased urine output.  He denies any recent falls or trauma.  He states that he has been compliant with his medications until he ran out.  He states he is not on any blood thinners.  He denies PND, orthopnea.  He denies any nausea or vomiting. He smokes about 2 cigarettes daily.      Hospital/ICU Course:  Critical care consulted for little improvement after continuous Bipap.      Past Medical History:   Diagnosis Date    Asthma     Atrial fibrillation with rapid ventricular response     CHF (congestive heart failure)     COPD (chronic obstructive pulmonary disease)     home O2 at night only    Coronary artery disease     Hypertension     Lung nodule     Pneumonia     Seizures        Past Surgical History:   Procedure Laterality Date    ESOPHAGOGASTRODUODENOSCOPY N/A 2/11/2022    Procedure: EGD  (ESOPHAGOGASTRODUODENOSCOPY);  Surgeon: Cain Ortega MD;  Location: [facility] ENDO ([address] [place] [address]);  Service: Endoscopy;  Laterality: N/A;    LEFT HEART CATHETERIZATION  4/23/2020    Procedure: Left heart cath;  Surgeon: Nic Pollack MD;  Location: [facility] CATH LAB;  Service: Cardiology;;       Review of patient's allergies indicates:   Allergen Reactions    Benazepril Swelling    Duoneb [ipratropium-albuterol]      Report minor Tremors, pt seems to be tolerating well.       Family History       Problem Relation (Age of Onset)    Cancer Father    Hypertension Sister    Stroke Sister, Brother          Tobacco Use    Smoking status: Current Some Day Smoker     Packs/day: 0.25     Types: Cigarettes    Smokeless tobacco: Never Used    Tobacco comment: 1-2 per day   Substance and Sexual Activity    Alcohol use: Yes     Alcohol/week: 2.0 standard drinks     Types: 2 Cans of beer per week     Comment: every night    Drug use: No    Sexual activity: Not Currently      Review of Systems   Constitutional:  Negative for fatigue and fever.   HENT:  Negative for congestion and sore throat.    Respiratory:  Positive for chest tightness and shortness of breath. Negative for cough and wheezing.    Cardiovascular:  Negative for chest pain and palpitations.   Gastrointestinal:  Negative for abdominal pain, blood in stool, constipation, diarrhea, nausea and vomiting.   Neurological:  Negative for dizziness, seizures, syncope, weakness, numbness and headaches.   Objective:     Vital Signs (Most Recent):  Temp: 99.2 °F (37.3 °C) (03/05/22 2138)  Pulse: 83 (03/05/22 2130)  Resp: 18 (03/05/22 2130)  BP: 129/75 (03/05/22 2130)  SpO2: 95 % (03/05/22 2130) Vital Signs (24h Range):  Temp:  [98.7 °F (37.1 °C)-99.2 °F (37.3 °C)] 99.2 °F (37.3 °C)  Pulse:  [] 83  Resp:  [18-50] 18  SpO2:  [88 %-99 %] 95 %  BP: (129-200)/(74-92) 129/75   Weight: 64 kg (141 lb)  Body mass index is 21.44 kg/m².      Intake/Output Summary (Last 24 hours) at  3/6/2022 0028  Last data filed at 3/5/2022 2237  Gross per 24 hour   Intake 50 ml   Output --   Net 50 ml       Physical Exam  Constitutional:       General: He is awake.   HENT:      Head: Normocephalic and atraumatic.   Cardiovascular:      Rate and Rhythm: Normal rate.      Heart sounds: Normal heart sounds.   Pulmonary:      Effort: Pulmonary effort is normal. No tachypnea, accessory muscle usage or respiratory distress.      Breath sounds: Decreased breath sounds present.   Abdominal:      General: Abdomen is flat.      Palpations: Abdomen is soft.      Tenderness: There is no abdominal tenderness.   Musculoskeletal:      Cervical back: Neck supple.      Right lower leg: No edema.      Left lower leg: No edema.   Neurological:      Mental Status: He is alert.       Vents:  Oxygen Concentration (%): 97 (Continous Nebulizer) (03/05/22 1752)  Lines/Drains/Airways       Peripheral Intravenous Line  Duration                  Peripheral IV - Single Lumen 03/05/22 2128 20 G Left Forearm <1 day                  Significant Labs:    CBC/Anemia Profile:  Recent Labs   Lab 03/05/22  1619 03/05/22  1624   WBC 10.92  --    HGB 13.1*  --    HCT 42.7 42     --    MCV 87  --    RDW 17.9*  --         Chemistries:  Recent Labs   Lab 03/05/22  1619      K 4.1   CL 93*   CO2 30*   BUN 9   CREATININE 0.7   CALCIUM 9.6   ALBUMIN 2.9*   PROT 6.8   BILITOT 0.7   ALKPHOS 77   ALT 18   AST 14       All pertinent labs within the past 24 hours have been reviewed.    Significant Imaging: I have reviewed all pertinent imaging results/findings within the past 24 hours.      ABG  Recent Labs   Lab 03/05/22 2121   PH 7.321*   PO2 43   PCO2 78.7*   HCO3 40.7*   BE 15     Assessment/Plan:     Pulmonary  Acute hypercapnic respiratory failure  Patient with Hypercapnic Respiratory failure which is Acute on chronic.  he is on home oxygen at 3 LPM. Supplemental oxygen was provided and noted- Oxygen Concentration (%):  [50-97] 97.    Signs/symptoms of respiratory failure include- tachypnea and increased work of breathing. Contributing diagnoses includes - CHF and COPD Labs and images were reviewed. Patient Has recent ABG, which has been reviewed. Will treat underlying causes and adjust management of respiratory failure as follows:    Patient is a chronic CO2 retainer with compensated pH. Patient was successfully weaned from Bipap in the ED with improved ABG on nasal cannula. No indication for ICU admission at this time.  -Recommend Bipap Q     Critical Care Time: 35 minutes  Critical secondary to Patient has a condition that poses threat to life and bodily function: Severe Respiratory Distress     Critical care was time spent personally by me on the following activities: development of treatment plan with patient or surrogate and bedside caregivers, discussions with consultants, evaluation of patient's response to treatment, examination of patient, ordering and performing treatments and interventions, ordering and review of laboratory studies, ordering and review of radiographic studies, pulse oximetry, re-evaluation of patient's condition. This critical care time did not overlap with that of any other provider or involve time for any procedures.    Thank you for your consult. I will sign off. Please contact us if you have any additional questions.     Saida Reveles, NP  Critical Care Medicine  [facility] - Telemetry Stepdown

## 2022-03-06 NOTE — SUBJECTIVE & OBJECTIVE
Past Medical History:   Diagnosis Date    Asthma     Atrial fibrillation with rapid ventricular response     CHF (congestive heart failure)     COPD (chronic obstructive pulmonary disease)     home O2 at night only    Coronary artery disease     Hypertension     Lung nodule     Pneumonia     Seizures      Past Surgical History:   Procedure Laterality Date    ESOPHAGOGASTRODUODENOSCOPY N/A 2/11/2022    Procedure: EGD (ESOPHAGOGASTRODUODENOSCOPY);  Surgeon: Cain Ortega MD;  Location: [facility] ENDO ([address] [place] [address]);  Service: Endoscopy;  Laterality: N/A;    LEFT HEART CATHETERIZATION  4/23/2020    Procedure: Left heart cath;  Surgeon: Nic Pollack MD;  Location: [facility] CATH LAB;  Service: Cardiology;;     Review of patient's allergies indicates:   Allergen Reactions    Benazepril Swelling    Duoneb [ipratropium-albuterol]      Report minor Tremors, pt seems to be tolerating well.     No current facility-administered medications on file prior to encounter.     Current Outpatient Medications on File Prior to Encounter   Medication Sig    acetaminophen (TYLENOL) 325 MG tablet Take 2 tablets (650 mg total) by mouth every 8 (eight) hours as needed for Pain or Temperature greater than (100.5).    albuterol (PROVENTIL/VENTOLIN HFA) 90 mcg/actuation inhaler Inhale 1-2 puffs into the lungs every 6 (six) hours as needed for Wheezing. Rescue    albuterol-ipratropium (DUO-NEB) 2.5 mg-0.5 mg/3 mL nebulizer solution Take 3 mLs by nebulization every 4 (four) hours as needed for Wheezing or Shortness of Breath. Rescue    budesonide-glycopyr-formoterol (BREZTRI AEROSPHERE) 160-9-4.8 mcg/actuation HFAA Inhale 2 puffs into the lungs 2 (two) times daily. Rinse mouth after use.    carvediloL (COREG) 12.5 MG tablet Take 1 tablet (12.5 mg total) by mouth 2 (two) times daily with meals.    furosemide (LASIX) 20 MG tablet Take 1 tablet (20 mg total) by mouth once daily.    pantoprazole (PROTONIX) 40 MG tablet Take 1 tablet (40 mg total) by mouth 2  (two) times daily.    SPIRIVA WITH HANDIHALER 18 mcg inhalation capsule Inhale 1 capsule (18 mcg total) into the lungs once daily.     Family History       Problem Relation (Age of Onset)    Cancer Father    Hypertension Sister    Stroke Sister, Brother          Tobacco Use    Smoking status: Current Some Day Smoker     Packs/day: 0.25     Types: Cigarettes    Smokeless tobacco: Never Used    Tobacco comment: 3-4 per day   Substance and Sexual Activity    Alcohol use: Yes     Alcohol/week: 2.0 standard drinks     Types: 2 Cans of beer per week     Comment: every night    Drug use: No    Sexual activity: Not Currently     Review of Systems   Constitutional:  Negative for fever.   HENT:  Negative for congestion, rhinorrhea, sneezing and sore throat.    Eyes:  Negative for photophobia and discharge.   Respiratory:  Positive for cough (dry), shortness of breath and wheezing.    Gastrointestinal:  Negative for abdominal pain, blood in stool, diarrhea, nausea and vomiting.   Genitourinary:  Negative for dysuria and hematuria.   Musculoskeletal:  Negative for arthralgias and myalgias.   Neurological:  Negative for dizziness, light-headedness and headaches.   Psychiatric/Behavioral:  Negative for agitation and behavioral problems.    Objective:     Vital Signs (Most Recent):  Temp: 99.2 °F (37.3 °C) (03/05/22 2138)  Pulse: 83 (03/05/22 2130)  Resp: 18 (03/05/22 2130)  BP: 129/75 (03/05/22 2130)  SpO2: 95 % (03/05/22 2130) Vital Signs (24h Range):  Temp:  [98.7 °F (37.1 °C)-99.2 °F (37.3 °C)] 99.2 °F (37.3 °C)  Pulse:  [] 83  Resp:  [18-50] 18  SpO2:  [88 %-99 %] 95 %  BP: (129-200)/(74-92) 129/75     Weight: 64 kg (141 lb)  Body mass index is 21.44 kg/m².    Physical Exam  Vitals and nursing note reviewed.   Constitutional:       General: He is not in acute distress.  HENT:      Head: Normocephalic and atraumatic.      Mouth/Throat:      Mouth: Mucous membranes are moist.   Eyes:      Extraocular Movements:  Extraocular movements intact.      Pupils: Pupils are equal, round, and reactive to light.   Cardiovascular:      Rate and Rhythm: Normal rate and regular rhythm.      Pulses: Normal pulses.      Heart sounds: Normal heart sounds. No murmur heard.  Pulmonary:      Effort: No tachypnea or accessory muscle usage.      Breath sounds: Examination of the right-upper field reveals wheezing. Examination of the left-upper field reveals wheezing. Examination of the right-middle field reveals rales. Examination of the left-middle field reveals rales. Examination of the right-lower field reveals rales. Examination of the left-lower field reveals rales. Wheezing (expiratory wheeze upper lobe) and rales present.   Abdominal:      General: Bowel sounds are normal.      Palpations: Abdomen is soft.      Tenderness: There is no abdominal tenderness.   Musculoskeletal:         General: No tenderness.      Right lower leg: No edema.      Left lower leg: No edema.   Skin:     General: Skin is warm.      Capillary Refill: Capillary refill takes less than 2 seconds.      Findings: No erythema or rash.   Neurological:      General: No focal deficit present.      Mental Status: He is alert and oriented to person, place, and time.   Psychiatric:         Mood and Affect: Mood normal.         Thought Content: Thought content normal.         CRANIAL NERVES     CN III, IV, VI   Pupils are equal, round, and reactive to light.     Significant Labs: All pertinent labs within the past 24 hours have been reviewed.  CBC:   Recent Labs   Lab 03/05/22  1619 03/05/22  1624   WBC 10.92  --    HGB 13.1*  --    HCT 42.7 42     --      CMP:   Recent Labs   Lab 03/05/22  1619      K 4.1   CL 93*   CO2 30*   GLU 81   BUN 9   CREATININE 0.7   CALCIUM 9.6   PROT 6.8   ALBUMIN 2.9*   BILITOT 0.7   ALKPHOS 77   AST 14   ALT 18   ANIONGAP 16   EGFRNONAA >60.0     Cardiac Markers:   Recent Labs   Lab 03/05/22  1619   BNP 93     Troponin:   Recent Labs    Lab 03/05/22  1619 03/05/22  2108   TROPONINI 0.031* 0.029*       Significant Imaging: I have reviewed all imaging

## 2022-03-06 NOTE — ASSESSMENT & PLAN NOTE
H/o AF  On carvedilol for rate control  Denies current or previous anticoagulation use    Continue coreg 12.5mg BID

## 2022-03-06 NOTE — ASSESSMENT & PLAN NOTE
Hypertensive on arrival; normalized without intervention  Currently normotensive  Continue home coreg

## 2022-03-06 NOTE — ASSESSMENT & PLAN NOTE
Has HFpEF EF 55% on TTE completed in Jan 2022  Takes lasix 20mg daily - reports compliance at home  Clinically appears euvolemic; BNP not elevated, no evidence of pulmonary edema on CXR    Plan:  Continue home lasix 20 mg daily  Monitor urine output   Cardiac diet

## 2022-03-06 NOTE — SUBJECTIVE & OBJECTIVE
Past Medical History:   Diagnosis Date    Asthma     Atrial fibrillation with rapid ventricular response     CHF (congestive heart failure)     COPD (chronic obstructive pulmonary disease)     home O2 at night only    Coronary artery disease     Hypertension     Lung nodule     Pneumonia     Seizures        Past Surgical History:   Procedure Laterality Date    ESOPHAGOGASTRODUODENOSCOPY N/A 2/11/2022    Procedure: EGD (ESOPHAGOGASTRODUODENOSCOPY);  Surgeon: Cain Ortega MD;  Location: [facility] ([address] [place] [address]);  Service: Endoscopy;  Laterality: N/A;    LEFT HEART CATHETERIZATION  4/23/2020    Procedure: Left heart cath;  Surgeon: Nic Pollack MD;  Location: [facility] CATH LAB;  Service: Cardiology;;       Review of patient's allergies indicates:   Allergen Reactions    Benazepril Swelling    Duoneb [ipratropium-albuterol]      Report minor Tremors, pt seems to be tolerating well.       Family History       Problem Relation (Age of Onset)    Cancer Father    Hypertension Sister    Stroke Sister, Brother          Tobacco Use    Smoking status: Current Some Day Smoker     Packs/day: 0.25     Types: Cigarettes    Smokeless tobacco: Never Used    Tobacco comment: 1-2 per day   Substance and Sexual Activity    Alcohol use: Yes     Alcohol/week: 2.0 standard drinks     Types: 2 Cans of beer per week     Comment: every night    Drug use: No    Sexual activity: Not Currently      Review of Systems   Constitutional:  Negative for fatigue and fever.   HENT:  Negative for congestion and sore throat.    Respiratory:  Positive for chest tightness and shortness of breath. Negative for cough and wheezing.    Cardiovascular:  Negative for chest pain and palpitations.   Gastrointestinal:  Negative for abdominal pain, blood in stool, constipation, diarrhea, nausea and vomiting.   Neurological:  Negative for dizziness, seizures, syncope, weakness, numbness and headaches.   Objective:     Vital Signs (Most Recent):  Temp: 99.2 °F (37.3 °C)  (03/05/22 2138)  Pulse: 83 (03/05/22 2130)  Resp: 18 (03/05/22 2130)  BP: 129/75 (03/05/22 2130)  SpO2: 95 % (03/05/22 2130) Vital Signs (24h Range):  Temp:  [98.7 °F (37.1 °C)-99.2 °F (37.3 °C)] 99.2 °F (37.3 °C)  Pulse:  [] 83  Resp:  [18-50] 18  SpO2:  [88 %-99 %] 95 %  BP: (129-200)/(74-92) 129/75   Weight: 64 kg (141 lb)  Body mass index is 21.44 kg/m².      Intake/Output Summary (Last 24 hours) at 3/6/2022 0028  Last data filed at 3/5/2022 2237  Gross per 24 hour   Intake 50 ml   Output --   Net 50 ml       Physical Exam  Constitutional:       General: He is awake.   HENT:      Head: Normocephalic and atraumatic.   Cardiovascular:      Rate and Rhythm: Normal rate.      Heart sounds: Normal heart sounds.   Pulmonary:      Effort: Pulmonary effort is normal. No tachypnea, accessory muscle usage or respiratory distress.      Breath sounds: Decreased breath sounds present.   Abdominal:      General: Abdomen is flat.      Palpations: Abdomen is soft.      Tenderness: There is no abdominal tenderness.   Musculoskeletal:      Cervical back: Neck supple.      Right lower leg: No edema.      Left lower leg: No edema.   Neurological:      Mental Status: He is alert.       Vents:  Oxygen Concentration (%): 97 (Continous Nebulizer) (03/05/22 1752)  Lines/Drains/Airways       Peripheral Intravenous Line  Duration                  Peripheral IV - Single Lumen 03/05/22 2128 20 G Left Forearm <1 day                  Significant Labs:    CBC/Anemia Profile:  Recent Labs   Lab 03/05/22  1619 03/05/22  1624   WBC 10.92  --    HGB 13.1*  --    HCT 42.7 42     --    MCV 87  --    RDW 17.9*  --         Chemistries:  Recent Labs   Lab 03/05/22  1619      K 4.1   CL 93*   CO2 30*   BUN 9   CREATININE 0.7   CALCIUM 9.6   ALBUMIN 2.9*   PROT 6.8   BILITOT 0.7   ALKPHOS 77   ALT 18   AST 14       All pertinent labs within the past 24 hours have been reviewed.    Significant Imaging: I have reviewed all pertinent  imaging results/findings within the past 24 hours.

## 2022-03-06 NOTE — ASSESSMENT & PLAN NOTE
Presenting with 3 days of worsening dyspnea  Denies chest pain  Troponins elevated but flat 0.031 --> 0.029  ECG - sinus tachycardia, no ischemic changes    Plan:  Troponins and EKG PRN for chest pain

## 2022-03-06 NOTE — ASSESSMENT & PLAN NOTE
Patient with Hypercapnic Respiratory failure which is Acute on chronic.  he is on home oxygen at 3 LPM. Supplemental oxygen was provided and noted- Oxygen Concentration (%):  [50-97] 97.   Signs/symptoms of respiratory failure include- tachypnea and increased work of breathing. Contributing diagnoses includes - CHF and COPD Labs and images were reviewed. Patient Has recent ABG, which has been reviewed. Will treat underlying causes and adjust management of respiratory failure as follows:    Patient is a chronic CO2 retainer with compensated pH. Patient was successfully weaned from Bipap in the ED with improved ABG on nasal cannula. No indication for ICU admission at this time.  -Recommend Bipap QHS

## 2022-03-06 NOTE — ED NOTES
Tele box [id number] applied to pt. in war room states able to see pt on monitor, rhythm normal sinus with HR 89.

## 2022-03-06 NOTE — HPI
61 year old male with COPD on 3L home O2, HTN, HFpEF (EF 55% 2022), CAD, HTN, AF, and remote seizure hx presenting with 3 days of worsening dyspnea. Reports dyspnea started 3 days ago and has gradually worsened despite regular inhaler use. Has a recent ED presentation for the same on 2/21 and was given a refill of his inhalers as well as a 5 day course of prednisone which he completed. States he ran out of his inhalers about 1.5 days ago. Noticed his oxygen saturations were declining at home and when his dyspnea became worse this morning, he called EMS. Has a chronic dry cough that hasn't changed recently - denies sputum production. Denies fevers or rhinorrhea. Denies sick contacts or recent travel. Denies abdominal pain, nausea, vomiting, diarrhea, or bloody stools. Denies hematemesis. Denies dysuria or hematuria. Denies weight gain or LL swelling. Lives alone at home but states his brother visits him every other day and takes him grocery shopping, to the pharmacy, doctor's appointments, etc.     On arrival to ED, hypertensive /92, tachycardiac 108, RR 26 saturating 97% on 4L NC. Required period of BiPAP in ED. Given Mg, albuterol nebs, and methylprednisone in ED. On review, BP had normalized, saturating 96% on 4L NC. No respiratory distress or accessory muscle use. Able to speak in full sentences. Had mild expiratory wheeze BL upper lobes and rales at BL mid and lower zones. Clinically euvolemic, no JVD or LL edema. Labs showing no leukocytosis, Hb improved from baseline. Electrolytes WNL. BNP 29, troponins elevated but flat. ECG showing sinus tachycardia, no ischemic changes. CXR showing hyperinflated lungs without obvious consolidation or overt pulmonary edema. Admitted to hospital medicine for management of COPD exacerbation.

## 2022-03-06 NOTE — ASSESSMENT & PLAN NOTE
Remote h/o seizure disorder  States his seizures started after a TBI  Has not had a seizure in decades and has not been on anti-epileptics since the 90s

## 2022-03-06 NOTE — PT/OT/SLP EVAL
"Physical Therapy Evaluation and Discharge Note    Patient Name:  Baltazar Mccray   MRN:  [id number]    Recommendations:     Discharge Recommendations:  home   Discharge Equipment Recommendations: none   Barriers to discharge: None    Assessment:     Baltazar Mccray is a 61 y.o. male admitted with a medical diagnosis of COPD exacerbation. Patient is independent with all functional mobility at this time and is at or near their functional baseline and is able to demonstrate independent bed mobility, transfers, and ambulation without AD. As a result, patient is without further acute care PT needs at this time. Please re-consult if pt has a change in status, will sign off.    Recent Surgery: * No surgery found *      Plan:     During this hospitalization, patient does not require further acute PT services.  Please re-consult if situation changes.      Subjective     Chief Complaint: "I feel great"  Patient/Family Comments/goals: d/c home  Pain/Comfort:  · Pain Rating 1: 0/10    Patients cultural, spiritual, Faith conflicts given the current situation: no    Living Environment: Pt lives alone in a first floor apt with no [place]  Prior level of function: independent without AD, uses 2-3L oxygen as needed, does not drive and uses public transportation  Support available upon discharge: none, has not needed  Equipment owned: oxygen    Objective:     Communicated with RN prior to session.  Patient found supine with telemetry, oxygen  upon PT entry to room.     General Precautions: Standard, fall   Orthopedic Precautions:N/A   Braces: N/A     Exams:  · RLE ROM: WFL  · RLE Strength: WFL  · LLE ROM: WFL  · LLE Strength: WFL    Functional Mobility:  · Bed Mobility: independent supine>sit     · Transfers: independent sit<>stand from EOB  · Gait: Patient ambulated within room and hallway ~30 ft independently on 3L oxygen. Appropriate gait mechanics and safety awareness   · Balance: intact, no overt LOB noted      Therapeutic Activities and " Exercises:   Education provided re: d/c planning, PT POC, safety in mobility, pursed lip breathing, energy conservation techniques. Pt verbalizes understanding.     Pt performed toileting, hygiene tasks, and standing ADLs independently.    AM-PAC 6 CLICK MOBILITY  Total Score:24     Patient left sitting edge of bed with all lines intact, call button in reach and RN notified.    GOALS:   Multidisciplinary Problems     Physical Therapy Goals     Not on file                History:     Past Medical History:   Diagnosis Date    Asthma     Atrial fibrillation with rapid ventricular response     CHF (congestive heart failure)     COPD (chronic obstructive pulmonary disease)     home O2 at night only    Coronary artery disease     Hypertension     Lung nodule     Pneumonia     Seizures        Past Surgical History:   Procedure Laterality Date    ESOPHAGOGASTRODUODENOSCOPY N/A 2/11/2022    Procedure: EGD (ESOPHAGOGASTRODUODENOSCOPY);  Surgeon: Cain Ortega MD;  Location: [facility] ENDO ([address] [place] [address]);  Service: Endoscopy;  Laterality: N/A;    LEFT HEART CATHETERIZATION  4/23/2020    Procedure: Left heart cath;  Surgeon: Nic Pollack MD;  Location: [facility] CATH LAB;  Service: Cardiology;;       Time Tracking:     PT Received On: 03/06/22  PT Start Time: 1040     PT Stop Time: 1057  PT Total Time (min): 17 min     Billable Minutes: Evaluation 9 min and Therapeutic Activity 8 min      Lilly Mccormick, PT  03/06/2022

## 2022-03-07 VITALS
SYSTOLIC BLOOD PRESSURE: 169 MMHG | OXYGEN SATURATION: 100 % | WEIGHT: 141.13 LBS | RESPIRATION RATE: 17 BRPM | DIASTOLIC BLOOD PRESSURE: 92 MMHG | HEART RATE: 71 BPM | TEMPERATURE: 98 F | BODY MASS INDEX: 22.15 KG/M2 | HEIGHT: 67 IN

## 2022-03-07 LAB
ALBUMIN SERPL BCP-MCNC: 2.9 G/DL (ref 3.5–5.2)
ALP SERPL-CCNC: 75 U/L (ref 55–135)
ALT SERPL W/O P-5'-P-CCNC: 15 U/L (ref 10–44)
ANION GAP SERPL CALC-SCNC: 13 MMOL/L (ref 8–16)
AST SERPL-CCNC: 9 U/L (ref 10–40)
BASOPHILS # BLD AUTO: 0.02 K/UL (ref 0–0.2)
BASOPHILS NFR BLD: 0.1 % (ref 0–1.9)
BILIRUB SERPL-MCNC: 0.2 MG/DL (ref 0.1–1)
BUN SERPL-MCNC: 18 MG/DL (ref 6–20)
CALCIUM SERPL-MCNC: 9.7 MG/DL (ref 8.7–10.5)
CHLORIDE SERPL-SCNC: 96 MMOL/L (ref 95–110)
CO2 SERPL-SCNC: 33 MMOL/L (ref 23–29)
CREAT SERPL-MCNC: 0.8 MG/DL (ref 0.5–1.4)
DIFFERENTIAL METHOD: ABNORMAL
EOSINOPHIL # BLD AUTO: 0 K/UL (ref 0–0.5)
EOSINOPHIL NFR BLD: 0 % (ref 0–8)
ERYTHROCYTE [DISTWIDTH] IN BLOOD BY AUTOMATED COUNT: 17.4 % (ref 11.5–14.5)
EST. GFR  (AFRICAN AMERICAN): >60 ML/MIN/1.73 M^2
EST. GFR  (NON AFRICAN AMERICAN): >60 ML/MIN/1.73 M^2
GLUCOSE SERPL-MCNC: 101 MG/DL (ref 70–110)
HCT VFR BLD AUTO: 39.7 % (ref 40–54)
HGB BLD-MCNC: 12.3 G/DL (ref 14–18)
IMM GRANULOCYTES # BLD AUTO: 0.1 K/UL (ref 0–0.04)
IMM GRANULOCYTES NFR BLD AUTO: 0.6 % (ref 0–0.5)
LYMPHOCYTES # BLD AUTO: 1 K/UL (ref 1–4.8)
LYMPHOCYTES NFR BLD: 6.3 % (ref 18–48)
MAGNESIUM SERPL-MCNC: 2 MG/DL (ref 1.6–2.6)
MCH RBC QN AUTO: 26.3 PG (ref 27–31)
MCHC RBC AUTO-ENTMCNC: 31 G/DL (ref 32–36)
MCV RBC AUTO: 85 FL (ref 82–98)
MONOCYTES # BLD AUTO: 0.8 K/UL (ref 0.3–1)
MONOCYTES NFR BLD: 5 % (ref 4–15)
NEUTROPHILS # BLD AUTO: 14.4 K/UL (ref 1.8–7.7)
NEUTROPHILS NFR BLD: 88 % (ref 38–73)
NRBC BLD-RTO: 0 /100 WBC
PHOSPHATE SERPL-MCNC: 2.6 MG/DL (ref 2.7–4.5)
PLATELET # BLD AUTO: 277 K/UL (ref 150–450)
PMV BLD AUTO: 12.4 FL (ref 9.2–12.9)
POCT GLUCOSE: 100 MG/DL (ref 70–110)
POCT GLUCOSE: 140 MG/DL (ref 70–110)
POCT GLUCOSE: 64 MG/DL (ref 70–110)
POTASSIUM SERPL-SCNC: 4.2 MMOL/L (ref 3.5–5.1)
PROT SERPL-MCNC: 6.7 G/DL (ref 6–8.4)
RBC # BLD AUTO: 4.68 M/UL (ref 4.6–6.2)
SODIUM SERPL-SCNC: 142 MMOL/L (ref 136–145)
WBC # BLD AUTO: 16.42 K/UL (ref 3.9–12.7)

## 2022-03-07 PROCEDURE — 63600175 PHARM REV CODE 636 W HCPCS

## 2022-03-07 PROCEDURE — 94640 AIRWAY INHALATION TREATMENT: CPT

## 2022-03-07 PROCEDURE — 25000003 PHARM REV CODE 250

## 2022-03-07 PROCEDURE — 99239 HOSP IP/OBS DSCHRG MGMT >30: CPT | Mod: ,,, | Performed by: STUDENT IN AN ORGANIZED HEALTH CARE EDUCATION/TRAINING PROGRAM

## 2022-03-07 PROCEDURE — 25000242 PHARM REV CODE 250 ALT 637 W/ HCPCS

## 2022-03-07 PROCEDURE — 85025 COMPLETE CBC W/AUTO DIFF WBC: CPT

## 2022-03-07 PROCEDURE — 84100 ASSAY OF PHOSPHORUS: CPT

## 2022-03-07 PROCEDURE — 99239 PR HOSPITAL DISCHARGE DAY,>30 MIN: ICD-10-PCS | Mod: ,,, | Performed by: STUDENT IN AN ORGANIZED HEALTH CARE EDUCATION/TRAINING PROGRAM

## 2022-03-07 PROCEDURE — S4991 NICOTINE PATCH NONLEGEND: HCPCS

## 2022-03-07 PROCEDURE — 94761 N-INVAS EAR/PLS OXIMETRY MLT: CPT

## 2022-03-07 PROCEDURE — 99900035 HC TECH TIME PER 15 MIN (STAT)

## 2022-03-07 PROCEDURE — 36415 COLL VENOUS BLD VENIPUNCTURE: CPT

## 2022-03-07 PROCEDURE — 27000221 HC OXYGEN, UP TO 24 HOURS

## 2022-03-07 PROCEDURE — 80053 COMPREHEN METABOLIC PANEL: CPT

## 2022-03-07 PROCEDURE — 83735 ASSAY OF MAGNESIUM: CPT

## 2022-03-07 RX ORDER — PREDNISONE 20 MG/1
40 TABLET ORAL DAILY
Qty: 4 TABLET | Refills: 0 | Status: SHIPPED | OUTPATIENT
Start: 2022-03-08 | End: 2022-03-10

## 2022-03-07 RX ORDER — SIMETHICONE 80 MG
1 TABLET,CHEWABLE ORAL 3 TIMES DAILY PRN
Status: DISCONTINUED | OUTPATIENT
Start: 2022-03-07 | End: 2022-03-07 | Stop reason: HOSPADM

## 2022-03-07 RX ORDER — TIOTROPIUM BROMIDE 18 UG/1
1 CAPSULE ORAL; RESPIRATORY (INHALATION) DAILY
Qty: 90 CAPSULE | Refills: 11 | Status: ON HOLD | OUTPATIENT
Start: 2022-03-07 | End: 2022-03-16 | Stop reason: HOSPADM

## 2022-03-07 RX ORDER — SODIUM,POTASSIUM PHOSPHATES 280-250MG
2 POWDER IN PACKET (EA) ORAL EVERY 4 HOURS
Status: DISCONTINUED | OUTPATIENT
Start: 2022-03-07 | End: 2022-03-07 | Stop reason: HOSPADM

## 2022-03-07 RX ORDER — BUDESONIDE, GLYCOPYRROLATE, AND FORMOTEROL FUMARATE 160; 9; 4.8 UG/1; UG/1; UG/1
2 AEROSOL, METERED RESPIRATORY (INHALATION) 2 TIMES DAILY
Qty: 10.7 G | Refills: 11 | Status: ON HOLD | OUTPATIENT
Start: 2022-03-07 | End: 2022-03-16 | Stop reason: SDUPTHER

## 2022-03-07 RX ADMIN — SIMETHICONE 80 MG: 80 TABLET, CHEWABLE ORAL at 05:03

## 2022-03-07 RX ADMIN — IPRATROPIUM BROMIDE AND ALBUTEROL SULFATE 3 ML: 2.5; .5 SOLUTION RESPIRATORY (INHALATION) at 12:03

## 2022-03-07 RX ADMIN — IPRATROPIUM BROMIDE AND ALBUTEROL SULFATE 3 ML: 2.5; .5 SOLUTION RESPIRATORY (INHALATION) at 07:03

## 2022-03-07 RX ADMIN — PREDNISONE 40 MG: 20 TABLET ORAL at 08:03

## 2022-03-07 RX ADMIN — PANTOPRAZOLE SODIUM 40 MG: 40 TABLET, DELAYED RELEASE ORAL at 08:03

## 2022-03-07 RX ADMIN — Medication 1 PATCH: at 08:03

## 2022-03-07 RX ADMIN — POTASSIUM & SODIUM PHOSPHATES POWDER PACK 280-160-250 MG 2 PACKET: 280-160-250 PACK at 09:03

## 2022-03-07 RX ADMIN — POLYETHYLENE GLYCOL 3350 17 G: 17 POWDER, FOR SOLUTION ORAL at 08:03

## 2022-03-07 RX ADMIN — FUROSEMIDE 20 MG: 20 TABLET ORAL at 08:03

## 2022-03-07 RX ADMIN — CARVEDILOL 12.5 MG: 12.5 TABLET, FILM COATED ORAL at 07:03

## 2022-03-07 RX ADMIN — Medication 16 G: at 07:03

## 2022-03-07 NOTE — NURSING
AVS given to and reviewed with pt. Bedside pharmacy delivered home medications. O2 tank at bedside for transportation. Awaiting transportation.

## 2022-03-07 NOTE — DISCHARGE SUMMARY
[facility] - Telemetry [facility] Medicine  Discharge Summary      Patient Name: Baltazar Mccray  MRN: [id number]  Patient Class: IP- Inpatient  Admission Date: 3/5/2022  Hospital Length of Stay: 2 days  Discharge Date and Time:  03/07/2022 1:58 PM  Attending Physician: Michael Galindo MD   Discharging Provider: Roque Rosas DO  Primary Care Provider: [facility] Medicine Team: [facility] HOSP MED 5 Roque Rosas DO    HPI:   61 year old male with COPD on 3L home O2, HTN, HFpEF (EF 55% 2022), CAD, HTN, AF, and remote seizure hx presenting with 3 days of worsening dyspnea. Reports dyspnea started 3 days ago and has gradually worsened despite regular inhaler use. Has a recent ED presentation for the same on 2/21 and was given a refill of his inhalers as well as a 5 day course of prednisone which he completed. States he ran out of his inhalers about 1.5 days ago. Noticed his oxygen saturations were declining at home and when his dyspnea became worse this morning, he called EMS. Has a chronic dry cough that hasn't changed recently - denies sputum production. Denies fevers or rhinorrhea. Denies sick contacts or recent travel. Denies abdominal pain, nausea, vomiting, diarrhea, or bloody stools. Denies hematemesis. Denies dysuria or hematuria. Denies weight gain or LL swelling. Lives alone at home but states his brother visits him every other day and takes him grocery shopping, to the pharmacy, doctor's appointments, etc.     On arrival to ED, hypertensive /92, tachycardiac 108, RR 26 saturating 97% on 4L NC. Required period of BiPAP in ED. Given Mg, albuterol nebs, and methylprednisone in ED. On review, BP had normalized, saturating 96% on 4L NC. No respiratory distress or accessory muscle use. Able to speak in full sentences. Had mild expiratory wheeze BL upper lobes and rales at BL mid and lower zones. Clinically euvolemic, no JVD or LL edema. Labs showing no leukocytosis, Hb improved from  baseline. Electrolytes WNL. BNP 29, troponins elevated but flat. ECG showing sinus tachycardia, no ischemic changes. CXR showing hyperinflated lungs without obvious consolidation or overt pulmonary edema. Admitted to hospital medicine for management of COPD exacerbation.       * No surgery found *      Hospital Course:    61 year old male with COPD on 3L home O2, HTN, HFpEF (EF 55% 2022), CAD, HTN, AF, and remote seizure hx admitted after 3 days of worsening dyspnea with likely COPD exacerbation. Started on prednisone course for exacerbation as well as restarted home inhaled medications. Abx were not started given that he was afebrile, no sputum production. Patient improved now back at baseline O2 requirement. Will plan do DC to home and finish course of prednisone. Given that running out of medications contributed to his hospitalization SW/CM were worked with to ensure home oxygen, as well as medication refills were provided.          Goals of Care Treatment Preferences:  Code Status: Full Code      Consults:   Consults (From admission, onward)        Status Ordering Provider     Inpatient consult to Critical Care Medicine  Once        Provider:  (Not yet assigned)    Completed FAZAL MURO          * COPD exacerbation  Has COPD, on 3L home O2  Uses albuterol, spiriva, and breti at home  Had recent ED presentation for worsening dyspnea on 2/12 - given 5 days of prednisone and refills for his inhalers    Reports 3 days of worsening dyspnea despite regular inhaler use  Has a chronic, dry cough; denies changes to quality or quantity of cough  Denies fevers or sick contacts  Given IV Mg, methlyprednisone, and duonebs in ED and was on continuous BiPAP for a period of time  Reports improvement in dyspnea  Currently saturating 96% on 4L NC; no resp distress or accessory muscle use; BL rales in mid and lower zones, mild expiratory wheeze BL upper zones  No leukocytosis, no consolidation on CXR    Plan:  Prednisone 40mg  daily for 4 days  No role for antibiotics at this time as no fevers or change to his cough  Continue O2 supplementation  Duonebs q6h  Albuterol PRN  BiPAP QHS    Gastric ulcer  Had EGD on 2/11/2022 which showed a non-bleeding gastric ulcer  Started on pantoprazole 40mg BID  Due for repeat EGD in April to assess for healing    Plan:  Continue pantoprazole 40mg BID        Atrial fibrillation  H/o AF  On carvedilol for rate control  Denies current or previous anticoagulation use.     Continue coreg 12.5mg BID        Chronic combined systolic and diastolic congestive heart failure  Has HFpEF EF 55% on TTE completed in Jan 2022  Takes lasix 20mg daily - reports compliance at home  Clinically appears euvolemic; BNP not elevated, no evidence of pulmonary edema on CXR    Plan:  Continue home lasix 20 mg daily        Tobacco abuse  Pt reports smoking 1-2 cigarettes per day  Does not smoke while wearing his oxygen  Pt motivated to quit smoking    Plan:  Smoking cessation education  Nicotine patches       Elevated troponin  Presenting with 3 days of worsening dyspnea  Denies chest pain  Troponins elevated but flat 0.031 --> 0.029  ECG - sinus tachycardia, no ischemic changes    Plan:  Troponins and EKG PRN for chest pain      Hx of seizure disorder  Remote h/o seizure disorder  States his seizures started after a TBI  Has not had a seizure in decades and has not been on anti-epileptics since the 90s       Essential hypertension    Continue home coreg        Final Active Diagnoses:    Diagnosis Date Noted POA    PRINCIPAL PROBLEM:  COPD exacerbation [J44.1] 05/14/2017 Yes    Acute hypercapnic respiratory failure [J96.02] 03/06/2022 Yes    Atrial fibrillation [I48.91] 03/05/2022 Unknown    Gastric ulcer [K25.9] 03/05/2022 Unknown    Chronic combined systolic and diastolic congestive heart failure [I50.42] 04/19/2020 Yes    Tobacco abuse [Z72.0] 08/12/2018 Yes     Chronic    Elevated troponin [R77.8] 08/12/2018 Yes    Hx  of seizure disorder [Z86.69] 11/20/2017 Not Applicable     Chronic    Essential hypertension [I10] 05/19/2017 Yes     Chronic      Problems Resolved During this Admission:       Discharged Condition: stable    Disposition: Home or Self Care    Follow Up:   Follow-up Information     [facility] .    Contact information:  [address]  [address]  [facility] [address]  [phone]           [facility] Follow up on 3/9/2022.    Why: Wednesday 3/9 @ 5:30pm  Contact information:  [address]  [facility] [address]  [phone]             [facility].    Specialty: DME Provider  Why: DME - Call for Oxygen needs or portable tank refills  Contact information:  [address]  [facility]  [facility] [address]  [phone]                       Patient Instructions:      Ambulatory referral/consult to Community Care   Standing Status: Future   Referral Priority: Routine Referral Type: Consultation   Referral Location: [facility]   Requested Specialty: Community Care   Number of Visits Requested: 1     Ambulatory referral/consult to [facility]   Standing Status: Future   Referral Priority: Routine Referral Type: Consultation   Referral Reason: Specialty Services Required   Number of Visits Requested: 1     Diet Cardiac     Notify your health care provider if you experience any of the following:  temperature >100.4     Notify your health care provider if you experience any of the following:  persistent nausea and vomiting or diarrhea     Notify your health care provider if you experience any of the following:  severe uncontrolled pain     Notify your health care provider if you experience any of the following:  redness, tenderness, or signs of infection (pain, swelling, redness, odor or green/yellow discharge around incision site)     Notify your health care provider if you experience any of the following:   difficulty breathing or increased cough     Notify your health care provider if you experience any of the following:  persistent dizziness, light-headedness, or visual disturbances     Notify your health care provider if you experience any of the following:  increased confusion or weakness     Activity as tolerated       Significant Diagnostic Studies: Labs:   CMP   Recent Labs   Lab 03/05/22  1619 03/06/22  0348 03/07/22  0318    133* 142   K 4.1 4.7 4.2   CL 93* 91* 96   CO2 30* 33* 33*   GLU 81 264* 101   BUN 9 15 18   CREATININE 0.7 0.8 0.8   CALCIUM 9.6 9.8 9.7   PROT 6.8 6.9 6.7   ALBUMIN 2.9* 2.9* 2.9*   BILITOT 0.7 0.4 0.2   ALKPHOS 77 78 75   AST 14 12 9*   ALT 18 17 15   ANIONGAP 16 9 13   ESTGFRAFRICA >60.0 >60.0 >60.0   EGFRNONAA >60.0 >60.0 >60.0    and CBC   Recent Labs   Lab 03/05/22  1619 03/05/22  1624 03/06/22  0348 03/07/22  0318   WBC 10.92  --  7.03 16.42*   HGB 13.1*  --  12.4* 12.3*   HCT 42.7   < > 39.9* 39.7*     --  263 277    < > = values in this interval not displayed.       Pending Diagnostic Studies:     None         Medications:  Reconciled Home Medications:      Medication List      START taking these medications    predniSONE 20 MG tablet  Commonly known as: DELTASONE  Take 2 tablets (40 mg total) by mouth once daily. for 2 days  Start taking on: March 8, 2022        CONTINUE taking these medications    acetaminophen 325 MG tablet  Commonly known as: TYLENOL  Take 2 tablets (650 mg total) by mouth every 8 (eight) hours as needed for Pain or Temperature greater than (100.5).     albuterol 90 mcg/actuation inhaler  Commonly known as: PROVENTIL/VENTOLIN HFA  Inhale 1-2 puffs into the lungs every 6 (six) hours as needed for Wheezing. Rescue     albuterol-ipratropium 2.5 mg-0.5 mg/3 mL nebulizer solution  Commonly known as: DUO-NEB  Take 3 mLs by nebulization every 4 (four) hours as needed for Wheezing or Shortness of Breath. Rescue     [facility] 160-9-4.8  mcg/actuation aa  Generic drug: budesonide-glycopyr-formoterol  Inhale 2 puffs into the lungs 2 (two) times daily. Rinse mouth after use.     carvediloL 12.5 MG tablet  Commonly known as: COREG  Take 1 tablet (12.5 mg total) by mouth 2 (two) times daily with meals.     furosemide 20 MG tablet  Commonly known as: LASIX  Take 1 tablet (20 mg total) by mouth once daily.     pantoprazole 40 MG tablet  Commonly known as: PROTONIX  Take 1 tablet (40 mg total) by mouth 2 (two) times daily.     SPIRIVA WITH HANDIHALER 18 mcg inhalation capsule  Generic drug: tiotropium  Inhale 1 capsule (18 mcg total) into the lungs once daily.            Indwelling Lines/Drains at time of discharge:   Lines/Drains/Airways     None                 Time spent on the discharge of patient: 35 minutes         Roque Rosas DO  Department of Hospital Medicine  [facility] - Telemetry Stepdown

## 2022-03-07 NOTE — ASSESSMENT & PLAN NOTE
Has COPD, on 3L home O2  Uses albuterol, spiriva, and breti at home  Had recent ED presentation for worsening dyspnea on 2/12 - given 5 days of prednisone and refills for his inhalers    Reports 3 days of worsening dyspnea despite regular inhaler use  Has a chronic, dry cough; denies changes to quality or quantity of cough  Denies fevers or sick contacts  Given IV Mg, methlyprednisone, and duonebs in ED and was on continuous BiPAP for a period of time  Reports improvement in dyspnea  Currently saturating 96% on 4L NC; no resp distress or accessory muscle use; BL rales in mid and lower zones, mild expiratory wheeze BL upper zones  No leukocytosis, no consolidation on CXR    Plan:  Prednisone 40mg daily for 4 days  No role for antibiotics at this time as no fevers or change to his cough  Continue O2 supplementation  Duonebs q6h  Albuterol PRN  BiPAP QHS

## 2022-03-07 NOTE — ASSESSMENT & PLAN NOTE
Has HFpEF EF 55% on TTE completed in Jan 2022  Takes lasix 20mg daily - reports compliance at home  Clinically appears euvolemic; BNP not elevated, no evidence of pulmonary edema on CXR    Plan:  Continue home lasix 20 mg daily

## 2022-03-07 NOTE — PLAN OF CARE
[facility] - Telemetry Stepdown  Initial Discharge Assessment       Primary Care Provider: [facility]    Admission Diagnosis: Shortness of breath [R06.02]  COPD exacerbation [J44.1]  Chest pain [R07.9]    Admission Date: 3/5/2022  Expected Discharge Date: 3/7/2022    Discharge Barriers Identified: None    Payor: MEDICAID / Plan: MEDICAID OF [place] / Product Type: Government /     Extended Emergency Contact Information  Primary Emergency Contact: Gladis Mccray   [place]  Mobile Phone: [phone]  Relation: Sister  Secondary Emergency Contact: Viry Mccray   [place]  Mobile Phone: [phone]  Relation: Sister    Discharge Plan A: Home  Discharge Plan B: Home      [id number] - [place] - [facility] AT [facility] [address]  [address] [address]  Phone: [phone] Fax: [phone]      Initial Assessment (most recent)     Adult Discharge Assessment - 03/07/22 1033        Discharge Assessment    Assessment Type Discharge Planning Assessment     Confirmed/corrected address, phone number and insurance Yes     Confirmed Demographics Correct on Facesheet     Source of Information patient     Communicated RUTH with patient/caregiver Yes     Reason For Admission COPD exacerbation     Lives With alone     Do you expect to return to your current living situation? Yes     Do you have help at home or someone to help you manage your care at home? No   Patient reports he brother occassionally visits to check on patient    Prior to hospitilization cognitive status: Alert/Oriented     Current cognitive status: Alert/Oriented     Walking or Climbing Stairs Difficulty none     Dressing/Bathing Difficulty none     Home Layout Able to live on 1st floor     Equipment Currently Used at Home oxygen     Do you currently have service(s) that help you manage your care at home? No     Who is going to help you get home at  discharge? Patient reports he will need transportation arranged     Are you on dialysis? No     Do you take coumadin? No     Discharge Plan A Home     Discharge Plan B Home     DME Needed Upon Discharge  oxygen   Needs new O2 concentrator and portable O2    Discharge Plan discussed with: Patient     Discharge Barriers Identified None               12:09 PM  Patient reports his home O2 concentrator is not working properly and his portable tanks are empty. SW is in communicated with MD and [facility] investigating solutions.    Jenni Birmingham LMSW  [facility]  Ext. [phone]

## 2022-03-07 NOTE — PLAN OF CARE
Problem: Adult Inpatient Plan of Care  Goal: Plan of Care Review  Outcome: Ongoing, Progressing  Goal: Absence of Hospital-Acquired Illness or Injury  Outcome: Ongoing, Progressing  Goal: Optimal Comfort and Wellbeing  Outcome: Ongoing, Progressing  POC reviewed with pt. A&Ox4. 3L NC. BG monitored. No acute changes. Safety checks performed. Bed in lowest position. Wheels locked. Call light in reach. Will continue to monitor.

## 2022-03-07 NOTE — PLAN OF CARE
[facility] - Telemetry Stepdown  Discharge Final Note    Primary Care Provider: [facility]    Expected Discharge Date: 3/7/2022    Final Discharge Note (most recent)     Final Note - 03/07/22 1507        Final Note    Assessment Type Final Discharge Note     Anticipated Discharge Disposition Home or Self Care     Hospital Resources/Appts/Education Provided Appointments scheduled and added to AVS        Post-Acute Status    Post-Acute Authorization HME     HME Status Set-up Complete/Auth obtained   Portable O2 delivered to patient at bedside. HME tech to visit patient's home today to evaluate home concentrator    Discharge Delays None known at this time               Contact Info     [facility]    [address]  [address]  [facility] [address]   Phone: [phone]       Next Steps: Follow up    [facility]   Relationship: PCP - General    [address]  [facility] [address]   Phone: [phone]       Next Steps: Follow up on 3/9/2022    Instructions: Wednesday 3/9 @ 5:30pm    [facility]   Specialty: DME Provider    [address]  [facility]  [facility] [address]   Phone: [phone]       Next Steps: Call    Instructions: DME - Call for Oxygen needs or portable tank refills        Patient will be transportation via Lyft to home today, Lyft will arrive at approximately 3:17; BILL informed nurse. Patient is being wheeled down to the ED exit for . Patient has medications and portable O2. Patient is aware [facility] tech will evaluate the home concentrator today.    BILL notified [facility] of patient leaving the hospital now.    3:34 PM  Notified [facility] that, per [employer], patient has arrived at home and tech can visit patient to check home concentrator.    Jenni Birmingham, MANAS  [facility]  Ext. [phone]

## 2022-03-07 NOTE — PLAN OF CARE
Hospital follow-up scheduled for patient on 2/18/2022 (see below) was scheduled prior to discharge within 7 days of discharge.    [facility]   [phone] /[phone].    [address]  [facility] [address]    Appt date:  2/18/2022   Appointment: Instructions: Appointment is with Viktoriya Mathews NP at 12pm. Please bring discharge summary, ID and insurance card.     CARLOS Pena  Case   Ext [phone]

## 2022-03-07 NOTE — NURSING
"Pt refused to get Accuchecks done, stated " I am not Diabetic and I want it to be taken off my chart," MD on call notified, he mentioned it is okay for tonight.   "

## 2022-03-07 NOTE — ASSESSMENT & PLAN NOTE
H/o AF  On carvedilol for rate control  Denies current or previous anticoagulation use.     Continue coreg 12.5mg BID

## 2022-03-07 NOTE — PLAN OF CARE
Pt AAO*4, calm, cooperative. Pt complained about stomach pain, Pepto Bismol did not help him and given Simethicone. Educated patient about cigarette smoking, verbalized understanding. No acute changes overnight, WCTM.

## 2022-03-07 NOTE — HOSPITAL COURSE
61 year old male with COPD on 3L home O2, HTN, HFpEF (EF 55% 2022), CAD, HTN, AF, and remote seizure hx admitted after 3 days of worsening dyspnea with likely COPD exacerbation. Started on prednisone course for exacerbation as well as restarted home inhaled medications. Abx were not started given that he was afebrile, no sputum production. Patient improved now back at baseline O2 requirement. Will plan do DC to home and finish course of prednisone. Given that running out of medications contributed to his hospitalization SW/CM were worked with to ensure home oxygen, as well as medication refills were provided.

## 2022-03-07 NOTE — PLAN OF CARE
[facility] and [facility] referrals made as patient is a high risk for readmit and needs assistance managing DME and COPD medications.     Rosio Mar LMSW   - Case

## 2022-03-08 ENCOUNTER — PATIENT OUTREACH (OUTPATIENT)
Dept: ADMINISTRATIVE | Facility: CLINIC | Age: 62
End: 2022-03-08
Payer: MEDICAID

## 2022-03-08 NOTE — PROGRESS NOTES
C3 nurse attempted to contact Baltazar Mahendra  for a TCC post hospital discharge follow up call. No answer. Left voicemail with callback information. The patient has a scheduled HOSFU appointment with Jaden  on 03/09/2022 @ 9701.

## 2022-03-09 NOTE — PROGRESS NOTES
C3 nurse attempted to contact [facility] [place]  for a TCC post hospital discharge follow up call. No answer. Left voicemail with callback information. The patient has a scheduled HOSFU appointment with Jaden  on 03/09/2022 @ 7298.

## 2022-03-09 NOTE — PROGRESS NOTES
C3 nurse attempted to contact Baltazar Mahendra  for a TCC post hospital discharge follow up call. No answer. Left voicemail with callback information. The patient has a scheduled HOSFU appointment with Jaden  on 03/09/2022 @ 9584.

## 2022-03-13 ENCOUNTER — HOSPITAL ENCOUNTER (INPATIENT)
Facility: HOSPITAL | Age: 62
LOS: 19 days | Discharge: HOME HEALTH | DRG: 189 | End: 2022-04-01
Attending: EMERGENCY MEDICINE | Admitting: INTERNAL MEDICINE
Payer: MEDICAID

## 2022-03-13 DIAGNOSIS — I10 ESSENTIAL HYPERTENSION: Chronic | ICD-10-CM

## 2022-03-13 DIAGNOSIS — Z75.8 DISCHARGE PLANNING ISSUES: ICD-10-CM

## 2022-03-13 DIAGNOSIS — J96.21 ACUTE ON CHRONIC RESPIRATORY FAILURE WITH HYPOXIA AND HYPERCAPNIA: ICD-10-CM

## 2022-03-13 DIAGNOSIS — Z60.9 POOR SOCIAL SITUATION: ICD-10-CM

## 2022-03-13 DIAGNOSIS — I48.0 PAROXYSMAL ATRIAL FIBRILLATION: ICD-10-CM

## 2022-03-13 DIAGNOSIS — J42 CHRONIC BRONCHITIS, UNSPECIFIED CHRONIC BRONCHITIS TYPE: Chronic | ICD-10-CM

## 2022-03-13 DIAGNOSIS — J96.22 ACUTE ON CHRONIC RESPIRATORY FAILURE WITH HYPOXIA AND HYPERCAPNIA: ICD-10-CM

## 2022-03-13 DIAGNOSIS — J44.1 COPD EXACERBATION: Primary | ICD-10-CM

## 2022-03-13 DIAGNOSIS — K25.9 GASTRIC ULCER, UNSPECIFIED CHRONICITY, UNSPECIFIED WHETHER GASTRIC ULCER HEMORRHAGE OR PERFORATION PRESENT: ICD-10-CM

## 2022-03-13 DIAGNOSIS — J96.22 ACUTE ON CHRONIC RESPIRATORY FAILURE WITH HYPERCAPNIA: ICD-10-CM

## 2022-03-13 DIAGNOSIS — R06.02 SOB (SHORTNESS OF BREATH): ICD-10-CM

## 2022-03-13 DIAGNOSIS — Z72.0 TOBACCO ABUSE: Chronic | ICD-10-CM

## 2022-03-13 DIAGNOSIS — Z01.811 PRE-OPERATIVE RESPIRATORY EXAMINATION: ICD-10-CM

## 2022-03-13 DIAGNOSIS — Z99.89 BIPAP (BIPHASIC POSITIVE AIRWAY PRESSURE) DEPENDENCE: ICD-10-CM

## 2022-03-13 DIAGNOSIS — J96.11 CHRONIC RESPIRATORY FAILURE WITH HYPOXIA AND HYPERCAPNIA: ICD-10-CM

## 2022-03-13 DIAGNOSIS — I50.42 CHRONIC COMBINED SYSTOLIC AND DIASTOLIC CONGESTIVE HEART FAILURE: ICD-10-CM

## 2022-03-13 DIAGNOSIS — J96.12 CHRONIC RESPIRATORY FAILURE WITH HYPOXIA AND HYPERCAPNIA: ICD-10-CM

## 2022-03-13 DIAGNOSIS — J96.21 ACUTE AND CHRONIC RESPIRATORY FAILURE WITH HYPOXIA: ICD-10-CM

## 2022-03-13 DIAGNOSIS — J96.22 ACUTE AND CHRONIC RESPIRATORY FAILURE WITH HYPERCAPNIA: ICD-10-CM

## 2022-03-13 DIAGNOSIS — R94.31 QT PROLONGATION: ICD-10-CM

## 2022-03-13 DIAGNOSIS — J44.9 CHRONIC OBSTRUCTIVE PULMONARY DISEASE, UNSPECIFIED COPD TYPE: ICD-10-CM

## 2022-03-13 DIAGNOSIS — I50.32 CHRONIC DIASTOLIC HEART FAILURE: ICD-10-CM

## 2022-03-13 LAB
ALBUMIN SERPL BCP-MCNC: 2.8 G/DL (ref 3.5–5.2)
ALLENS TEST: ABNORMAL
ALLENS TEST: ABNORMAL
ALP SERPL-CCNC: 86 U/L (ref 55–135)
ALT SERPL W/O P-5'-P-CCNC: 38 U/L (ref 10–44)
ANION GAP SERPL CALC-SCNC: 11 MMOL/L (ref 8–16)
AST SERPL-CCNC: 31 U/L (ref 10–40)
BASOPHILS # BLD AUTO: 0.03 K/UL (ref 0–0.2)
BASOPHILS NFR BLD: 0.2 % (ref 0–1.9)
BILIRUB SERPL-MCNC: 0.2 MG/DL (ref 0.1–1)
BNP SERPL-MCNC: 174 PG/ML (ref 0–99)
BUN SERPL-MCNC: 15 MG/DL (ref 6–20)
CALCIUM SERPL-MCNC: 8.5 MG/DL (ref 8.7–10.5)
CHLORIDE SERPL-SCNC: 100 MMOL/L (ref 95–110)
CO2 SERPL-SCNC: 34 MMOL/L (ref 23–29)
CREAT SERPL-MCNC: 1 MG/DL (ref 0.5–1.4)
CTP QC/QA: YES
DELSYS: ABNORMAL
DELSYS: ABNORMAL
DIFFERENTIAL METHOD: ABNORMAL
EOSINOPHIL # BLD AUTO: 0.3 K/UL (ref 0–0.5)
EOSINOPHIL NFR BLD: 2.6 % (ref 0–8)
EP: 5
EP: 8
ERYTHROCYTE [DISTWIDTH] IN BLOOD BY AUTOMATED COUNT: 18.5 % (ref 11.5–14.5)
EST. GFR  (AFRICAN AMERICAN): >60 ML/MIN/1.73 M^2
EST. GFR  (NON AFRICAN AMERICAN): >60 ML/MIN/1.73 M^2
FIO2: 40
FIO2: 70
GLUCOSE SERPL-MCNC: 103 MG/DL (ref 70–110)
HCO3 UR-SCNC: 41 MMOL/L (ref 24–28)
HCO3 UR-SCNC: 41.7 MMOL/L (ref 24–28)
HCT VFR BLD AUTO: 42.7 % (ref 40–54)
HGB BLD-MCNC: 12.5 G/DL (ref 14–18)
IMM GRANULOCYTES # BLD AUTO: 0.18 K/UL (ref 0–0.04)
IMM GRANULOCYTES NFR BLD AUTO: 1.4 % (ref 0–0.5)
IP: 12
IP: 16
LYMPHOCYTES # BLD AUTO: 3.3 K/UL (ref 1–4.8)
LYMPHOCYTES NFR BLD: 26.1 % (ref 18–48)
MCH RBC QN AUTO: 26.2 PG (ref 27–31)
MCHC RBC AUTO-ENTMCNC: 29.3 G/DL (ref 32–36)
MCV RBC AUTO: 89 FL (ref 82–98)
MODE: ABNORMAL
MODE: ABNORMAL
MONOCYTES # BLD AUTO: 1.1 K/UL (ref 0.3–1)
MONOCYTES NFR BLD: 8.7 % (ref 4–15)
NEUTROPHILS # BLD AUTO: 7.8 K/UL (ref 1.8–7.7)
NEUTROPHILS NFR BLD: 61 % (ref 38–73)
NRBC BLD-RTO: 0 /100 WBC
PCO2 BLDA: 104.6 MMHG (ref 35–45)
PCO2 BLDA: 126.8 MMHG (ref 35–45)
PH SMN: 7.12 [PH] (ref 7.35–7.45)
PH SMN: 7.21 [PH] (ref 7.35–7.45)
PLATELET # BLD AUTO: 282 K/UL (ref 150–450)
PMV BLD AUTO: 10.6 FL (ref 9.2–12.9)
PO2 BLDA: 27 MMHG (ref 40–60)
PO2 BLDA: 73 MMHG (ref 40–60)
POC BE: 12 MMOL/L
POC BE: 14 MMOL/L
POC SATURATED O2: 33 % (ref 95–100)
POC SATURATED O2: 86 % (ref 95–100)
POC TCO2: 45 MMOL/L (ref 24–29)
POC TCO2: 45 MMOL/L (ref 24–29)
POTASSIUM SERPL-SCNC: 3.5 MMOL/L (ref 3.5–5.1)
PROT SERPL-MCNC: 6.4 G/DL (ref 6–8.4)
RBC # BLD AUTO: 4.78 M/UL (ref 4.6–6.2)
SAMPLE: ABNORMAL
SAMPLE: ABNORMAL
SARS-COV-2 RDRP RESP QL NAA+PROBE: NEGATIVE
SITE: ABNORMAL
SITE: ABNORMAL
SODIUM SERPL-SCNC: 145 MMOL/L (ref 136–145)
TROPONIN I SERPL DL<=0.01 NG/ML-MCNC: 0.03 NG/ML (ref 0–0.03)
WBC # BLD AUTO: 12.81 K/UL (ref 3.9–12.7)

## 2022-03-13 PROCEDURE — 93005 ELECTROCARDIOGRAM TRACING: CPT

## 2022-03-13 PROCEDURE — 27000221 HC OXYGEN, UP TO 24 HOURS

## 2022-03-13 PROCEDURE — 99285 EMERGENCY DEPT VISIT HI MDM: CPT | Mod: CS,,, | Performed by: EMERGENCY MEDICINE

## 2022-03-13 PROCEDURE — 99291 CRITICAL CARE FIRST HOUR: CPT | Mod: ,,, | Performed by: PHYSICIAN ASSISTANT

## 2022-03-13 PROCEDURE — 80053 COMPREHEN METABOLIC PANEL: CPT | Performed by: EMERGENCY MEDICINE

## 2022-03-13 PROCEDURE — 99900035 HC TECH TIME PER 15 MIN (STAT)

## 2022-03-13 PROCEDURE — 82803 BLOOD GASES ANY COMBINATION: CPT

## 2022-03-13 PROCEDURE — 93010 EKG 12-LEAD: ICD-10-PCS | Mod: ,,, | Performed by: INTERNAL MEDICINE

## 2022-03-13 PROCEDURE — 83880 ASSAY OF NATRIURETIC PEPTIDE: CPT | Performed by: EMERGENCY MEDICINE

## 2022-03-13 PROCEDURE — 85025 COMPLETE CBC W/AUTO DIFF WBC: CPT | Performed by: EMERGENCY MEDICINE

## 2022-03-13 PROCEDURE — 63600175 PHARM REV CODE 636 W HCPCS: Performed by: EMERGENCY MEDICINE

## 2022-03-13 PROCEDURE — 99291 PR CRITICAL CARE, E/M 30-74 MINUTES: ICD-10-PCS | Mod: ,,, | Performed by: PHYSICIAN ASSISTANT

## 2022-03-13 PROCEDURE — U0002 COVID-19 LAB TEST NON-CDC: HCPCS | Performed by: EMERGENCY MEDICINE

## 2022-03-13 PROCEDURE — 94761 N-INVAS EAR/PLS OXIMETRY MLT: CPT

## 2022-03-13 PROCEDURE — 93010 ELECTROCARDIOGRAM REPORT: CPT | Mod: ,,, | Performed by: INTERNAL MEDICINE

## 2022-03-13 PROCEDURE — 87040 BLOOD CULTURE FOR BACTERIA: CPT | Performed by: EMERGENCY MEDICINE

## 2022-03-13 PROCEDURE — 94660 CPAP INITIATION&MGMT: CPT

## 2022-03-13 PROCEDURE — 99900031 HC PATIENT EDUCATION (STAT)

## 2022-03-13 PROCEDURE — 25000242 PHARM REV CODE 250 ALT 637 W/ HCPCS: Performed by: EMERGENCY MEDICINE

## 2022-03-13 PROCEDURE — 25000003 PHARM REV CODE 250: Performed by: EMERGENCY MEDICINE

## 2022-03-13 PROCEDURE — 96365 THER/PROPH/DIAG IV INF INIT: CPT

## 2022-03-13 PROCEDURE — 94640 AIRWAY INHALATION TREATMENT: CPT

## 2022-03-13 PROCEDURE — 96367 TX/PROPH/DG ADDL SEQ IV INF: CPT

## 2022-03-13 PROCEDURE — 27000190 HC CPAP FULL FACE MASK W/VALVE

## 2022-03-13 PROCEDURE — 20000000 HC ICU ROOM

## 2022-03-13 PROCEDURE — 99291 CRITICAL CARE FIRST HOUR: CPT

## 2022-03-13 PROCEDURE — 84484 ASSAY OF TROPONIN QUANT: CPT | Performed by: EMERGENCY MEDICINE

## 2022-03-13 PROCEDURE — 99285 PR EMERGENCY DEPT VISIT,LEVEL V: ICD-10-PCS | Mod: CS,,, | Performed by: EMERGENCY MEDICINE

## 2022-03-13 RX ORDER — PREDNISONE 20 MG/1
40 TABLET ORAL DAILY
Status: COMPLETED | OUTPATIENT
Start: 2022-03-14 | End: 2022-03-18

## 2022-03-13 RX ORDER — ENOXAPARIN SODIUM 100 MG/ML
40 INJECTION SUBCUTANEOUS EVERY 24 HOURS
Status: DISCONTINUED | OUTPATIENT
Start: 2022-03-13 | End: 2022-04-01 | Stop reason: HOSPADM

## 2022-03-13 RX ORDER — PANTOPRAZOLE SODIUM 40 MG/1
40 TABLET, DELAYED RELEASE ORAL 2 TIMES DAILY
Status: DISCONTINUED | OUTPATIENT
Start: 2022-03-14 | End: 2022-04-01 | Stop reason: HOSPADM

## 2022-03-13 RX ORDER — SODIUM CHLORIDE 0.9 % (FLUSH) 0.9 %
10 SYRINGE (ML) INJECTION
Status: DISCONTINUED | OUTPATIENT
Start: 2022-03-13 | End: 2022-04-01 | Stop reason: HOSPADM

## 2022-03-13 RX ORDER — IPRATROPIUM BROMIDE 0.5 MG/2.5ML
1 SOLUTION RESPIRATORY (INHALATION)
Status: COMPLETED | OUTPATIENT
Start: 2022-03-13 | End: 2022-03-13

## 2022-03-13 RX ORDER — CARVEDILOL 12.5 MG/1
12.5 TABLET ORAL 2 TIMES DAILY WITH MEALS
Status: DISCONTINUED | OUTPATIENT
Start: 2022-03-14 | End: 2022-04-01 | Stop reason: HOSPADM

## 2022-03-13 RX ORDER — METHYLPREDNISOLONE SOD SUCC 125 MG
125 VIAL (EA) INJECTION
Status: DISCONTINUED | OUTPATIENT
Start: 2022-03-13 | End: 2022-03-13

## 2022-03-13 RX ORDER — ALBUTEROL SULFATE 2.5 MG/.5ML
2.5 SOLUTION RESPIRATORY (INHALATION) ONCE
Status: COMPLETED | OUTPATIENT
Start: 2022-03-13 | End: 2022-03-13

## 2022-03-13 RX ORDER — IPRATROPIUM BROMIDE AND ALBUTEROL SULFATE 2.5; .5 MG/3ML; MG/3ML
10 SOLUTION RESPIRATORY (INHALATION)
Status: COMPLETED | OUTPATIENT
Start: 2022-03-13 | End: 2022-03-13

## 2022-03-13 RX ORDER — IPRATROPIUM BROMIDE AND ALBUTEROL SULFATE 2.5; .5 MG/3ML; MG/3ML
3 SOLUTION RESPIRATORY (INHALATION) EVERY 6 HOURS
Status: DISCONTINUED | OUTPATIENT
Start: 2022-03-14 | End: 2022-04-01 | Stop reason: HOSPADM

## 2022-03-13 RX ORDER — ALBUTEROL SULFATE 2.5 MG/.5ML
15 SOLUTION RESPIRATORY (INHALATION) ONCE
Status: COMPLETED | OUTPATIENT
Start: 2022-03-13 | End: 2022-03-13

## 2022-03-13 RX ADMIN — AZITHROMYCIN 500 MG: 500 INJECTION, POWDER, LYOPHILIZED, FOR SOLUTION INTRAVENOUS at 08:03

## 2022-03-13 RX ADMIN — IPRATROPIUM BROMIDE AND ALBUTEROL SULFATE 10 ML: .5; 2.5 SOLUTION RESPIRATORY (INHALATION) at 08:03

## 2022-03-13 RX ADMIN — IPRATROPIUM BROMIDE 1 MG: 0.5 SOLUTION RESPIRATORY (INHALATION) at 07:03

## 2022-03-13 RX ADMIN — ALBUTEROL SULFATE 2.5 MG: 2.5 SOLUTION RESPIRATORY (INHALATION) at 08:03

## 2022-03-13 RX ADMIN — ALBUTEROL SULFATE 15 MG: 2.5 SOLUTION RESPIRATORY (INHALATION) at 07:03

## 2022-03-13 RX ADMIN — CEFTRIAXONE 1 G: 1 INJECTION, SOLUTION INTRAVENOUS at 10:03

## 2022-03-13 SDOH — SOCIAL DETERMINANTS OF HEALTH (SDOH): PROBLEM RELATED TO SOCIAL ENVIRONMENT, UNSPECIFIED: Z60.9

## 2022-03-14 LAB
ALBUMIN SERPL BCP-MCNC: 2.7 G/DL (ref 3.5–5.2)
ALLENS TEST: ABNORMAL
ALLENS TEST: ABNORMAL
ALP SERPL-CCNC: 82 U/L (ref 55–135)
ALT SERPL W/O P-5'-P-CCNC: 30 U/L (ref 10–44)
ANION GAP SERPL CALC-SCNC: 7 MMOL/L (ref 8–16)
AST SERPL-CCNC: 19 U/L (ref 10–40)
BASOPHILS # BLD AUTO: 0 K/UL (ref 0–0.2)
BASOPHILS NFR BLD: 0 % (ref 0–1.9)
BILIRUB SERPL-MCNC: 0.3 MG/DL (ref 0.1–1)
BUN SERPL-MCNC: 16 MG/DL (ref 6–20)
CALCIUM SERPL-MCNC: 9 MG/DL (ref 8.7–10.5)
CHLORIDE SERPL-SCNC: 96 MMOL/L (ref 95–110)
CO2 SERPL-SCNC: 38 MMOL/L (ref 23–29)
CREAT SERPL-MCNC: 0.8 MG/DL (ref 0.5–1.4)
DELSYS: ABNORMAL
DELSYS: ABNORMAL
DIFFERENTIAL METHOD: ABNORMAL
EOSINOPHIL # BLD AUTO: 0 K/UL (ref 0–0.5)
EOSINOPHIL NFR BLD: 0 % (ref 0–8)
EP: 8
ERYTHROCYTE [DISTWIDTH] IN BLOOD BY AUTOMATED COUNT: 18.1 % (ref 11.5–14.5)
ERYTHROCYTE [SEDIMENTATION RATE] IN BLOOD BY WESTERGREN METHOD: 24 MM/H
EST. GFR  (AFRICAN AMERICAN): >60 ML/MIN/1.73 M^2
EST. GFR  (NON AFRICAN AMERICAN): >60 ML/MIN/1.73 M^2
FIO2: 30
FLOW: 3
GLUCOSE SERPL-MCNC: 138 MG/DL (ref 70–110)
HCO3 UR-SCNC: 36.9 MMOL/L (ref 24–28)
HCO3 UR-SCNC: 43.4 MMOL/L (ref 24–28)
HCT VFR BLD AUTO: 37 % (ref 40–54)
HGB BLD-MCNC: 11.4 G/DL (ref 14–18)
IMM GRANULOCYTES # BLD AUTO: 0.09 K/UL (ref 0–0.04)
IMM GRANULOCYTES NFR BLD AUTO: 1.1 % (ref 0–0.5)
INR PPP: 1 (ref 0.8–1.2)
IP: 16
LYMPHOCYTES # BLD AUTO: 0.6 K/UL (ref 1–4.8)
LYMPHOCYTES NFR BLD: 7.4 % (ref 18–48)
MAGNESIUM SERPL-MCNC: 2.1 MG/DL (ref 1.6–2.6)
MCH RBC QN AUTO: 26.6 PG (ref 27–31)
MCHC RBC AUTO-ENTMCNC: 30.8 G/DL (ref 32–36)
MCV RBC AUTO: 86 FL (ref 82–98)
MIN VOL: 9.2
MODE: ABNORMAL
MODE: ABNORMAL
MONOCYTES # BLD AUTO: 0.1 K/UL (ref 0.3–1)
MONOCYTES NFR BLD: 0.8 % (ref 4–15)
NEUTROPHILS # BLD AUTO: 7.2 K/UL (ref 1.8–7.7)
NEUTROPHILS NFR BLD: 90.7 % (ref 38–73)
NRBC BLD-RTO: 0 /100 WBC
PCO2 BLDA: 61.3 MMHG (ref 35–45)
PCO2 BLDA: 74.1 MMHG (ref 35–45)
PH SMN: 7.38 [PH] (ref 7.35–7.45)
PH SMN: 7.39 [PH] (ref 7.35–7.45)
PHOSPHATE SERPL-MCNC: 3.2 MG/DL (ref 2.7–4.5)
PLATELET # BLD AUTO: 236 K/UL (ref 150–450)
PMV BLD AUTO: 10.2 FL (ref 9.2–12.9)
PO2 BLDA: 30 MMHG (ref 40–60)
PO2 BLDA: 52 MMHG (ref 80–100)
POC BE: 12 MMOL/L
POC BE: 18 MMOL/L
POC SATURATED O2: 52 % (ref 95–100)
POC SATURATED O2: 85 % (ref 95–100)
POC TCO2: 39 MMOL/L (ref 23–27)
POC TCO2: 46 MMOL/L (ref 24–29)
POTASSIUM SERPL-SCNC: 4.2 MMOL/L (ref 3.5–5.1)
PROT SERPL-MCNC: 6.1 G/DL (ref 6–8.4)
PROTHROMBIN TIME: 10.3 SEC (ref 9–12.5)
RBC # BLD AUTO: 4.29 M/UL (ref 4.6–6.2)
SAMPLE: ABNORMAL
SAMPLE: ABNORMAL
SITE: ABNORMAL
SITE: ABNORMAL
SODIUM SERPL-SCNC: 141 MMOL/L (ref 136–145)
SP02: 91
SP02: 96
SPONT RATE: 24
WBC # BLD AUTO: 7.95 K/UL (ref 3.9–12.7)

## 2022-03-14 PROCEDURE — 94660 CPAP INITIATION&MGMT: CPT

## 2022-03-14 PROCEDURE — 84100 ASSAY OF PHOSPHORUS: CPT | Performed by: PHYSICIAN ASSISTANT

## 2022-03-14 PROCEDURE — 63600175 PHARM REV CODE 636 W HCPCS: Performed by: NURSE PRACTITIONER

## 2022-03-14 PROCEDURE — 83735 ASSAY OF MAGNESIUM: CPT | Performed by: PHYSICIAN ASSISTANT

## 2022-03-14 PROCEDURE — 94640 AIRWAY INHALATION TREATMENT: CPT

## 2022-03-14 PROCEDURE — 63600175 PHARM REV CODE 636 W HCPCS: Performed by: PHYSICIAN ASSISTANT

## 2022-03-14 PROCEDURE — 25000003 PHARM REV CODE 250: Performed by: NURSE PRACTITIONER

## 2022-03-14 PROCEDURE — 82803 BLOOD GASES ANY COMBINATION: CPT

## 2022-03-14 PROCEDURE — 20000000 HC ICU ROOM

## 2022-03-14 PROCEDURE — 80053 COMPREHEN METABOLIC PANEL: CPT | Performed by: PHYSICIAN ASSISTANT

## 2022-03-14 PROCEDURE — 99900035 HC TECH TIME PER 15 MIN (STAT)

## 2022-03-14 PROCEDURE — 25000003 PHARM REV CODE 250: Performed by: INTERNAL MEDICINE

## 2022-03-14 PROCEDURE — 85610 PROTHROMBIN TIME: CPT | Performed by: PHYSICIAN ASSISTANT

## 2022-03-14 PROCEDURE — 25000242 PHARM REV CODE 250 ALT 637 W/ HCPCS: Performed by: PHYSICIAN ASSISTANT

## 2022-03-14 PROCEDURE — 85025 COMPLETE CBC W/AUTO DIFF WBC: CPT | Performed by: PHYSICIAN ASSISTANT

## 2022-03-14 PROCEDURE — 27000221 HC OXYGEN, UP TO 24 HOURS

## 2022-03-14 PROCEDURE — 99233 PR SUBSEQUENT HOSPITAL CARE,LEVL III: ICD-10-PCS | Mod: ,,, | Performed by: INTERNAL MEDICINE

## 2022-03-14 PROCEDURE — 99233 SBSQ HOSP IP/OBS HIGH 50: CPT | Mod: ,,, | Performed by: INTERNAL MEDICINE

## 2022-03-14 PROCEDURE — 94761 N-INVAS EAR/PLS OXIMETRY MLT: CPT

## 2022-03-14 PROCEDURE — 25000003 PHARM REV CODE 250: Performed by: PHYSICIAN ASSISTANT

## 2022-03-14 PROCEDURE — 36600 WITHDRAWAL OF ARTERIAL BLOOD: CPT

## 2022-03-14 RX ORDER — CALCIUM CARBONATE 200(500)MG
500 TABLET,CHEWABLE ORAL ONCE
Status: COMPLETED | OUTPATIENT
Start: 2022-03-14 | End: 2022-03-14

## 2022-03-14 RX ORDER — AZITHROMYCIN 250 MG/1
500 TABLET, FILM COATED ORAL DAILY
Status: COMPLETED | OUTPATIENT
Start: 2022-03-15 | End: 2022-03-15

## 2022-03-14 RX ORDER — MUPIROCIN 20 MG/G
OINTMENT TOPICAL 2 TIMES DAILY
Status: DISPENSED | OUTPATIENT
Start: 2022-03-14 | End: 2022-03-19

## 2022-03-14 RX ORDER — HYDRALAZINE HYDROCHLORIDE 20 MG/ML
10 INJECTION INTRAMUSCULAR; INTRAVENOUS EVERY 6 HOURS PRN
Status: DISCONTINUED | OUTPATIENT
Start: 2022-03-14 | End: 2022-04-01 | Stop reason: HOSPADM

## 2022-03-14 RX ORDER — SIMETHICONE 80 MG
1 TABLET,CHEWABLE ORAL 3 TIMES DAILY PRN
Status: DISCONTINUED | OUTPATIENT
Start: 2022-03-14 | End: 2022-04-01 | Stop reason: HOSPADM

## 2022-03-14 RX ADMIN — ENOXAPARIN SODIUM 40 MG: 40 INJECTION SUBCUTANEOUS at 04:03

## 2022-03-14 RX ADMIN — CARVEDILOL 12.5 MG: 12.5 TABLET, FILM COATED ORAL at 09:03

## 2022-03-14 RX ADMIN — MUPIROCIN: 20 OINTMENT TOPICAL at 08:03

## 2022-03-14 RX ADMIN — IPRATROPIUM BROMIDE AND ALBUTEROL SULFATE 3 ML: 2.5; .5 SOLUTION RESPIRATORY (INHALATION) at 12:03

## 2022-03-14 RX ADMIN — IPRATROPIUM BROMIDE AND ALBUTEROL SULFATE 3 ML: 2.5; .5 SOLUTION RESPIRATORY (INHALATION) at 08:03

## 2022-03-14 RX ADMIN — SIMETHICONE 80 MG: 80 TABLET, CHEWABLE ORAL at 04:03

## 2022-03-14 RX ADMIN — PANTOPRAZOLE SODIUM 40 MG: 40 TABLET, DELAYED RELEASE ORAL at 09:03

## 2022-03-14 RX ADMIN — IPRATROPIUM BROMIDE AND ALBUTEROL SULFATE 3 ML: 2.5; .5 SOLUTION RESPIRATORY (INHALATION) at 07:03

## 2022-03-14 RX ADMIN — ENOXAPARIN SODIUM 40 MG: 40 INJECTION SUBCUTANEOUS at 12:03

## 2022-03-14 RX ADMIN — AZITHROMYCIN 500 MG: 500 INJECTION, POWDER, LYOPHILIZED, FOR SOLUTION INTRAVENOUS at 09:03

## 2022-03-14 RX ADMIN — HYDRALAZINE HYDROCHLORIDE 10 MG: 20 INJECTION, SOLUTION INTRAMUSCULAR; INTRAVENOUS at 06:03

## 2022-03-14 RX ADMIN — PANTOPRAZOLE SODIUM 40 MG: 40 TABLET, DELAYED RELEASE ORAL at 08:03

## 2022-03-14 RX ADMIN — CEFTRIAXONE 1 G: 1 INJECTION, SOLUTION INTRAVENOUS at 08:03

## 2022-03-14 RX ADMIN — PREDNISONE 40 MG: 20 TABLET ORAL at 09:03

## 2022-03-14 RX ADMIN — CARVEDILOL 12.5 MG: 12.5 TABLET, FILM COATED ORAL at 04:03

## 2022-03-14 RX ADMIN — CALCIUM CARBONATE (ANTACID) CHEW TAB 500 MG 500 MG: 500 CHEW TAB at 03:03

## 2022-03-14 NOTE — ED TRIAGE NOTES
Pt presents to ED via Ems with c/o SOB. Hx of COPD. Pt arrives wearing CPAP. Pt appears lethargic upon arrival

## 2022-03-14 NOTE — ED NOTES
"Patient stating need to have BM. Patient offered bedpan, pt states "I never used that! I can't use that!". Patient educated on need for bed pan due to bipap and respiratory status by RN and MD. Patient refusing to use bedpan at this time, states "just don't worry about it". Patient instructed to inform nurse if bed pan is needed.  "

## 2022-03-14 NOTE — HPI
Mei Mccray 61yoM w/PMHx of CHF, COPD, HTN, AFib who presented to [facility] w/ shortness of breath 03/13.  Per EMS, patient had been feeling short of breath all day.  He was found to be hypoxic with increased work of breathing. En route to ED, he received 1 DuoNeb, Solu-Medrol 125mg, magnesium 2 g and 2.5 of Versed for acute agitation.  He was placed on CPAP.     Upon arrival to ED,  patient was tachypneic to 30s and hypertensive 150s/60s. He was transitioned from CPAP to BIPAP. Labs notable for WBC 12.8, Hg 12.5, Bicarb 34, , VBG 7.209/PCO2 104/PO2 27/PHCO3 41/BE 14+- while on Bipap. CXR w/interstitial coarsening- interstitial pneumonia vs.pulm edema. Admitted to MICU due to need for continuous bipap.

## 2022-03-14 NOTE — PLAN OF CARE
[facility] - Intensive Care ([facility]-)  Initial Discharge Assessment       Primary Care Provider: Rhonda Of Katia    Admission Diagnosis: SOB (shortness of breath) [R06.02]  COPD exacerbation [J44.1]  BiPAP (biphasic positive airway pressure) dependence [Z99.89]  Acute on chronic respiratory failure with hypoxia and hypercapnia [J96.21, J96.22]    Admission Date: 3/13/2022  Expected Discharge Date: 3/17/2022    Discharge Barriers Identified: Underinsured    Payor: MEDICAID / Plan: MEDICAID [place] / Product Type: Government /     Extended Emergency Contact Information  Primary Emergency Contact: Gladis Mccray   [place]  Mobile Phone: [phone]  Relation: Sister  Secondary Emergency Contact: Viry Mccray   [place]  Mobile Phone: [phone]  Relation: Sister    Discharge Plan A: Home  Discharge Plan B: Home      [facility]  [address]  [facility] [address]  Phone: [phone] Fax: [phone]    [id number] - [place], [place] - [facility] [address] [facility] [address] [facility]  [address] [address]  Phone: [phone] Fax: [phone]      Transferred from:     Past Medical History:   Diagnosis Date    Asthma     Atrial fibrillation with rapid ventricular response     CHF (congestive heart failure)     COPD (chronic obstructive pulmonary disease)     home O2 at night only    Coronary artery disease     Hypertension     Lung nodule     Pneumonia     Seizures          CM met with patient in room for Discharge Planning Assessment.  Patient was able to answer questions.  Per patient, he lives alone in a 1st floor apartment with 0 step(s) to enter.   Per patient, he was independent with ADLS and used no DME for ambulation. Per patient, he is not on dialysis and does not take Coumadin.   Patient will need transportation home upon discharge.  Patient will have help from Gladis Mccray  (sister) [phone], Viry Mccray (sister) [phone]. Discharge Planning Booklet given to patient/family and discussed.  All questions addressed.  CM will follow for needs.      Initial Assessment (most recent)     Adult Discharge Assessment - 03/14/22 1509        Discharge Assessment    Assessment Type Discharge Planning Assessment     Confirmed/corrected address, phone number and insurance Yes     Confirmed Demographics Correct on Facesheet     Source of Information patient     When was your last doctors appointment? --   unknown    Communicated RUTH with patient/caregiver Date not available/Unable to determine     Reason For Admission Acute on chronic respiratory failure with hypercapnia     Lives With alone     Facility Arrived From: Home     Do you expect to return to your current living situation? Yes     Do you have help at home or someone to help you manage your care at home? Yes     Who are your caregiver(s) and their phone number(s)? Gladis Mccray (sister) [phone], Viry Mccray (sister) [phone]     Prior to hospitilization cognitive status: Unable to Assess   due to  patient's shortness of breath    Current cognitive status: Alert/Oriented     Walking or Climbing Stairs Difficulty none     Dressing/Bathing Difficulty none     Equipment Currently Used at Home none     Readmission within 30 days? Yes     Patient currently being followed by outpatient case management? No     Do you currently have service(s) that help you manage your care at home? No     Do you take prescription medications? Yes     Do you have prescription coverage? Yes     Coverage Medicaid - Medicaid of [place]     Do you have any problems affording any of your prescribed medications? No     Is the patient taking medications as prescribed? yes     Who is going to help you get home at discharge? patient will need transportation home upon discharge.     How do you get to doctors appointments? family or friend will provide     Are  you on dialysis? No     Do you take coumadin? No     Discharge Plan A Home     Discharge Plan B Home     DME Needed Upon Discharge  other (see comments)   TBD    Discharge Plan discussed with: Patient     Discharge Barriers Identified Underinsured                        PCP:  BarbaraRixford  [phone]        Pharmacy:    [facility]  [address]  [place] [address]  Phone: [phone] Fax: [phone]    [id number] - [place] - [facility] [facility] [address] [facility] [place] [address]  Phone: [phone] Fax: [phone]        Emergency Contacts:  Extended Emergency Contact Information  Primary Emergency Contact: Gladis Mccray   [place]  Mobile Phone: [phone]  Relation: Sister  Secondary Emergency Contact: Viry Mccray   [place]  Mobile Phone: [phone]  Relation: Sister      Insurance:    Payor: MEDICAID / Plan: [place]ICA[place] / Product Type: Government /     Dilcia Sharma RN     [phone]      03/14/2022  3:18 PM

## 2022-03-14 NOTE — PLAN OF CARE
Hospital Medicine ICU Acceptance Note    Date of Admit: 3/13/2022  Date of Transfer / Stepdown: 3/14/2022  Taryn, C/J, L, Onc (IV chemo w/in 1 month), Gyn/Onc, or other special case?: no   ICU team stepping patient down: MICU  ICU team member giving verbal handoff: Maria Del Carmen Bell PA-C  Accepting  team: D    Brief History of Present Illness:      Mei Mccray 61yoM w/PMHx of CHF, COPD, HTN, AFib who presented to [facility] w/ shortness of breath 03/13.  Per EMS, patient had been feeling short of breath all day.  He was found to be hypoxic with increased work of breathing. En route to ED, he received 1 DuoNeb, Solu-Medrol 125mg, magnesium 2 g and 2.5 of Versed for acute agitation.  He was placed on CPAP.     Upon arrival to ED,  patient was tachypneic to 30s and hypertensive 150s/60s. He was transitioned from CPAP to BIPAP. Labs notable for WBC 12.8, Hg 12.5, Bicarb 34, , VBG 7.209/PCO2 104/PO2 27/PHCO3 41/BE 14+- while on Bipap. CXR w/interstitial coarsening- interstitial pneumonia vs.pulm edema. Admitted to MICU due to need for continuous bipap.       Hospital/ICU Course:     ICU Course:  Patient was started on azithro/rocephin for suspected CAP/ treatment for COPD exacerbation. Continued on prednisone 40mg qday. Breathing treatments set at every 6 hours. Patient was weaned to bipap at nighttime and w/naps. On 3L BNC. Most recent VBG w/pH 7.37/pCO2 30/pO2 30/pHCO3 43/BE 18. Deemed stable for transfer to hospital medicine 03/14.    Hospital Medicine Course:  Patient was transferred to hospital medicine 03/14.    Consultants and Procedures:     Consultants:  -- N/A    Procedures:    -- N/A    Transfer Information:     Diet:  -- Cardiac Diet     Physical Activity:  -- PT consulted     To Do / Pending Studies / Follow ups:    - Transition to oral antibiotics  - Obtain ABG so that patient can qualify for home bipap and can begin process to obtain it  - Continue steroids and breathing treatments      Patient has been accepted by Hospital Medicine Team D, who will assume care of the patient upon arrival to the floor from the ICU. Please contact ICU team with any concerns prior to arrival. Please contact Hospital Medicine at [phone] or [phone] (please do NOT leave a voicemail) when patient arrives to the floor.          Marilee Jackson MD  Department of Hospital Medicine  Department of Pediatrics  3/14/2022

## 2022-03-14 NOTE — HOSPITAL COURSE
ICU Course:  Patient was started on azithro/rocephin for suspected CAP/ treatment for COPD exacerbation. Continued on prednisone 40mg qday. Breathing treatments set at every 6 hours. Patient was weaned to bipap at nighttime and w/naps. On 3L BNC. Most recent VBG w/pH 7.37/pCO2 30/pO2 30/pHCO3 43/BE 18. Deemed stable for transfer to hospital medicine 03/14.    Hospital Medicine Course:  Patient officially transferred to hospital medicine service 03/16. He was doing well- on home oxygen settings. Repeat ECG with normal Qtc. Patient was transitioned to levofloxacin to complete 5d course. Sent script to complete 5d of prednisone total. Outpatient follow-up scheduled w/PCP and pulmonology. Patient was stable for discharge to home 03/16, but had delays in arranging home bipap. Awaiting approval from insurance company.

## 2022-03-14 NOTE — RESIDENT HANDOFF
ICU Transfer of Care Note  Critical Care Medicine    Admit Date: 3/13/2022  LOS: 1    CC: Acute on chronic respiratory failure with hypercapnia    Code Status: Full Code         HPI and Hospital Course:     HPI:  Mei Woodard is a 61 year old male with COPD on 3L home O2, HTN, HFpEF (EF 55% 2022), CAD, and HTN who presented to [facility] ED on 3/13/2022 for SOB for last day. Patient reports symptoms started yesterday afternoon. He activated EMS today due to worsening symptoms. Per EMS, patient was found to be hypoxic with increased WOB. He received 1 DuoNeb, Solu-Medrol 125mg, magnesium 2 g and 2.5 of Versed for acute agitation. He was placed on CPAP and transported to the emergency department. States his rescue inhalers did not improve his symptoms. Patient has a chronic nonproductive cough which remains at baseline. Denies chest pain, abdominal pain, N/V, or LE edema. Patient continues to smoke. Of note, patient frequently hospitalized for COPD exacerbation, most recent 3/5/22 - 3/7/22.      In the ED, patient was transitioned to bipap. ABG significant for hypercapnic respiratory failure. CXR with hyperinflation but no signs of focal consolidation. ABG improved on bipap but respiratory status remained tenuous. Critical care medicine consulted and patient admitted to the MICU for acute on chronic hypercapnic respiratory failure 2/2 COPD exacerbation      Hospital/ICU Course:  Patient admitted on 03/13 for COPD exacerbation. PCO2 on admit 124. Placed on bipap and transferred to the ICU. Prednisone, Ceftriaxone, and Azithromycin started. Pt tolerating nasal cannula twill be stepped down to hospital medicine.         To Follow Up:     Case management consulted for home bipap  Wean O2 as tolerated  Continue CAP coverage x 3 days  Continue prednisone x 5 days      Discharge Plan:     Home per primary team    Call with questions.

## 2022-03-14 NOTE — HPI
Mei Woodard is a 61 year old male with COPD on 3L home O2, HTN, HFpEF (EF 55% 2022), CAD, and HTN who presented to [facility] ED on 3/13/2022 for SOB for last day. Patient reports symptoms started yesterday afternoon. He activated EMS today due to worsening symptoms. Per EMS, patient was found to be hypoxic with increased WOB. He received 1 DuoNeb, Solu-Medrol 125mg, magnesium 2 g and 2.5 of Versed for acute agitation. He was placed on CPAP and transported to the emergency department. States his rescue inhalers did not improve his symptoms. Patient has a chronic nonproductive cough which remains at baseline. Denies chest pain, abdominal pain, N/V, or LE edema. Patient continues to smoke. Of note, patient frequently hospitalized for COPD exacerbation, most recent 3/5/22 - 3/7/22.     In the ED, patient was transitioned to bipap. ABG significant for hypercapnic respiratory failure. CXR with hyperinflation but no signs of focal consolidation. ABG improved on bipap but respiratory status remained tenuous. Critical care medicine consulted and patient admitted to the MICU for acute on chronic hypercapnic respiratory failure 2/2 COPD exacerbation.

## 2022-03-14 NOTE — ASSESSMENT & PLAN NOTE
TTE EF 55% with grade 1 diastolic dysfunction and mild tricuspid regurgitation  Takes lasix 20 mg as outpatient    --Continue lasix

## 2022-03-14 NOTE — ASSESSMENT & PLAN NOTE
Patient with Hypercapnic Respiratory failure which is Acute on chronic.  he is on home oxygen at 3 LPM. Supplemental oxygen was provided and noted- Oxygen Concentration (%):  [30] 30.   Signs/symptoms of respiratory failure include- tachypnea, increased work of breathing and wheezing. Contributing diagnoses includes - COPD Labs and images were reviewed. Patient Has recent ABG, which has been reviewed. Will treat underlying causes and adjust management of respiratory failure as follows-    Baseline pCO2 in mid 70s. Arrived with PCO2 128. Does not follow with pulmonary as an outpatient.   Uses albuterol, spiriva and Bretzi Areosphere as an outpatient    --Continue bipap  --duonebs  --azithromycin x3 days  --prednisone x5 days  --needs pulmonary follow up at discharge

## 2022-03-14 NOTE — ASSESSMENT & PLAN NOTE
Patient with Hypercapnic Respiratory failure which is Acute on chronic.  he is on home oxygen at 2 LPM. Supplemental oxygen was provided and noted- Oxygen Concentration (%):  [30] 30.   Signs/symptoms of respiratory failure include- tachypnea, increased work of breathing, respiratory distress and wheezing. Contributing diagnoses includes - CHF and COPD Labs and images were reviewed. Patient Has recent ABG, which has been reviewed. Will treat underlying causes and adjust management of respiratory failure as follows-     -Case management consulted for home Bipap

## 2022-03-14 NOTE — PROGRESS NOTES
[facility] - Intensive Care ([place] [address])  Critical Care Medicine  Progress Note    Patient Name: Baltazar Mccray  MRN: [id number]  Admission Date: 3/13/2022  Hospital Length of Stay: 1 days  Code Status: Full Code  Attending Provider: Marcell Celestin MD  Primary Care Provider: Rhonda Of Katia   Principal Problem: Acute on chronic respiratory failure with hypercapnia    Subjective:     HPI:  Mei Woodard is a 61 year old male with COPD on 3L home O2, HTN, HFpEF (EF 55% 2022), CAD, and HTN who presented to [facility] ED on 3/13/2022 for SOB for last day. Patient reports symptoms started yesterday afternoon. He activated EMS today due to worsening symptoms. Per EMS, patient was found to be hypoxic with increased WOB. He received 1 DuoNeb, Solu-Medrol 125mg, magnesium 2 g and 2.5 of Versed for acute agitation. He was placed on CPAP and transported to the emergency department. States his rescue inhalers did not improve his symptoms. Patient has a chronic nonproductive cough which remains at baseline. Denies chest pain, abdominal pain, N/V, or LE edema. Patient continues to smoke. Of note, patient frequently hospitalized for COPD exacerbation, most recent 3/5/22 - 3/7/22.      In the ED, patient was transitioned to bipap. ABG significant for hypercapnic respiratory failure. CXR with hyperinflation but no signs of focal consolidation. ABG improved on bipap but respiratory status remained tenuous. Critical care medicine consulted and patient admitted to the MICU for acute on chronic hypercapnic respiratory failure 2/2 COPD exacerbation      Hospital/ICU Course:  Patient admitted on 03/13 for COPD exacerbation. PCO2 on admit 124. Placed on bipap and transferred to the ICU. Prednisone, Ceftriaxone, and Azithromycin started. Pt tolerating nasal cannula twill be stepped down to hospital medicine.       Interval History: Pt tolerating 4LNC. Only requiring bipap while sleeping. Consulted case management for bipap at home.  Started on CAP and prednisone.     Objective:     Vital Signs (Most Recent):  Temp: 97.7 °F (36.5 °C) (03/14/22 0301)  Pulse: 73 (03/14/22 1215)  Resp: (!) 26 (03/14/22 1215)  BP: (!) 198/92 (03/14/22 0903)  SpO2: 96 % (03/14/22 1215)   Vital Signs (24h Range):  Temp:  [97.7 °F (36.5 °C)-98 °F (36.7 °C)] 97.7 °F (36.5 °C)  Pulse:  [] 73  Resp:  [13-45] 26  SpO2:  [89 %-100 %] 96 %  BP: (128-198)/(67-98) 198/92     Weight: 64 kg (141 lb 1.5 oz)  Body mass index is 22.1 kg/m².      Intake/Output Summary (Last 24 hours) at 3/14/2022 1345  Last data filed at 3/14/2022 1100  Gross per 24 hour   Intake 298.68 ml   Output 375 ml   Net -76.32 ml       Physical Exam  Vitals and nursing note reviewed.   Constitutional:       Appearance: Normal appearance.   HENT:      Mouth/Throat:      Mouth: Mucous membranes are moist.      Comments: Poor dentition  Eyes:      Pupils: Pupils are equal, round, and reactive to light.   Cardiovascular:      Rate and Rhythm: Normal rate and regular rhythm.      Pulses: Normal pulses.      Heart sounds: Normal heart sounds.   Pulmonary:      Effort: Pulmonary effort is normal.      Breath sounds: Wheezing present.   Abdominal:      General: Abdomen is flat. Bowel sounds are normal.      Palpations: Abdomen is soft.   Genitourinary:     Comments: Clear yellow urine  Musculoskeletal:         General: Normal range of motion.   Skin:     General: Skin is warm and dry.   Neurological:      General: No focal deficit present.      Mental Status: He is alert and oriented to person, place, and time.       Vents:  Oxygen Concentration (%): 30 (03/14/22 1215)    Lines/Drains/Airways       Peripheral Intravenous Line  Duration                  Peripheral IV - Single Lumen 03/13/22 1925 18 G Right Antecubital <1 day         Peripheral IV - Single Lumen 03/13/22 2041 18 G Left Hand <1 day                    Significant Labs:    CBC/Anemia Profile:  Recent Labs   Lab 03/13/22 1924 03/14/22  0552   WBC  12.81* 7.95   HGB 12.5* 11.4*   HCT 42.7 37.0*    236   MCV 89 86   RDW 18.5* 18.1*        Chemistries:  Recent Labs   Lab 03/13/22 2020 03/14/22  0552    141   K 3.5 4.2    96   CO2 34* 38*   BUN 15 16   CREATININE 1.0 0.8   CALCIUM 8.5* 9.0   ALBUMIN 2.8* 2.7*   PROT 6.4 6.1   BILITOT 0.2 0.3   ALKPHOS 86 82   ALT 38 30   AST 31 19   MG  --  2.1   PHOS  --  3.2       All pertinent labs within the past 24 hours have been reviewed.    Significant Imaging:  I have reviewed all pertinent imaging results/findings within the past 24 hours.      ABG  Recent Labs   Lab 03/14/22  1335   PH 7.387   PO2 52*   PCO2 61.3*   HCO3 36.9*   BE 12     Assessment/Plan:     Pulmonary  * Acute on chronic respiratory failure with hypercapnia  Patient with Hypercapnic Respiratory failure which is Acute on chronic.  he is on home oxygen at 2 LPM. Supplemental oxygen was provided and noted- Oxygen Concentration (%):  [30] 30.   Signs/symptoms of respiratory failure include- tachypnea, increased work of breathing, respiratory distress and wheezing. Contributing diagnoses includes - CHF and COPD Labs and images were reviewed. Patient Has recent ABG, which has been reviewed. Will treat underlying causes and adjust management of respiratory failure as follows-     -Case management consulted for home Bipap    Chronic obstructive pulmonary disease  On home oxygen.     -Wean O2 as tolerated to keep O2 saturations >88%  -Continue CAP coverage with ceftriaxone and azithromycin  -Continue prednisone  -Scheduled duonebs q 6 hrs      Cardiac/Vascular  Essential hypertension  -Continue home Coreg    Other  Tobacco abuse  Recommend cessation.     -Cessation counseling  -Nicotine patch if needed       Critical Care Daily Checklist:    A: Awake: RASS Goal/Actual Goal:    Actual: Rosa Agitation Sedation Scale (RASS): Alert and calm   B: Spontaneous Breathing Trial Performed?     C: SAT & SBT Coordinated?  N/A                       D: Delirium: CAM-ICU Overall CAM-ICU: Negative   E: Early Mobility Performed? Yes   F: Feeding Goal:    Status:     Current Diet Order   Procedures    Diet Cardiac [facility]     Order Specific Question:   Indicate patient location for additional diet options:     Answer:   [facility]      AS: Analgesia/Sedation N/A   T: Thromboembolic Prophylaxis Lovenox   H: HOB > 300 Yes   U: Stress Ulcer Prophylaxis (if needed) N/A   G: Glucose Control 140-180   B: Bowel Function Stool Occurrence: 1   I: Indwelling Catheter (Lines & Lucas) Necessity PIV   D: De-escalation of Antimicrobials/Pharmacotherapies On CAP coverage    Plan for the day/ETD Stepdown to floor    Code Status:  Family/Goals of Care: Full Code  Ongoing     Plan discussed with Dr. Fawad Diggs, ROBYN  Critical Care Medicine  [facility] - Intensive Care ([facility])

## 2022-03-14 NOTE — ED PROVIDER NOTES
Encounter Date: 3/13/2022       History     Chief Complaint   Patient presents with    Shortness of Breath     Pt brought to ED via [facility] from home on CPAP for SOB that started a few hours ago.      Mei Mccray 61-year-old male history of CHF, COPD, hypertension, AFib presenting today with shortness of breath.  Per EMS, patient has been feeling short of breath all day.  He was found found hypoxic with increased work of breathing.  He was then treated with 1 DuoNeb, Solu-Medrol 125, magnesium 2 g and 2.5 of Versed for acute agitation.  He was placed on CPAP and transported to the emergency department.  On arrival, patient does report some improvement in symptoms.  Switched over to BiPAP.  History limited due to patients critical condition        Review of patient's allergies indicates:   Allergen Reactions    Benazepril Swelling    Duoneb [ipratropium-albuterol]      Report minor Tremors, pt seems to be tolerating well.     Past Medical History:   Diagnosis Date    Asthma     Atrial fibrillation with rapid ventricular response     CHF (congestive heart failure)     COPD (chronic obstructive pulmonary disease)     home O2 at night only    Coronary artery disease     Hypertension     Lung nodule     Pneumonia     Seizures      Past Surgical History:   Procedure Laterality Date    ESOPHAGOGASTRODUODENOSCOPY N/A 2/11/2022    Procedure: EGD (ESOPHAGOGASTRODUODENOSCOPY);  Surgeon: Cain Ortega MD;  Location: [facility] ENDO ([address] [place] [address]);  Service: Endoscopy;  Laterality: N/A;    LEFT HEART CATHETERIZATION  4/23/2020    Procedure: Left heart cath;  Surgeon: Nic Pollack MD;  Location: [facility] CATH LAB;  Service: Cardiology;;     Family History   Problem Relation Age of Onset    Cancer Father     Hypertension Sister     Stroke Sister     Stroke Brother     Diabetes Neg Hx     Heart disease Neg Hx      Social History     Tobacco Use    Smoking status: Current Some Day Smoker     Packs/day: 0.25     Types:  Cigarettes    Smokeless tobacco: Never Used    Tobacco comment: 1-2 per day   Substance Use Topics    Alcohol use: Yes     Alcohol/week: 2.0 standard drinks     Types: 2 Cans of beer per week     Comment: every night    Drug use: No     Review of Systems   Unable to perform ROS: Acuity of condition       Physical Exam     Initial Vitals   BP Pulse Resp Temp SpO2   03/13/22 1918 03/13/22 1918 03/13/22 1918 03/13/22 1924 03/13/22 1918   (!) 156/89 101 (!) 30 98 °F (36.7 °C) 100 %      MAP       --                Physical Exam    Nursing note and vitals reviewed.  Constitutional: He appears well-developed and well-nourished. No distress.   HENT:   Mouth/Throat: Oropharynx is clear and moist.   Eyes: Conjunctivae are normal.   Neck: Neck supple. No JVD present.   Cardiovascular: Normal rate, regular rhythm and intact distal pulses.   No murmur heard.  Pulmonary/Chest: He is in respiratory distress. He has wheezes. He has no rales.   Poor air entry bilaterally, retraction   Abdominal: Abdomen is soft. Bowel sounds are normal. There is no abdominal tenderness. There is no rebound and no guarding.   Musculoskeletal:         General: Edema (Trace) present.      Cervical back: Neck supple.     Neurological: He is alert and oriented to person, place, and time.   Skin: Skin is warm. Capillary refill takes less than 2 seconds. No rash noted.         ED Course   Procedures  Labs Reviewed   CBC W/ AUTO DIFFERENTIAL - Abnormal; Notable for the following components:       Result Value    WBC 12.81 (*)     Hemoglobin 12.5 (*)     MCH 26.2 (*)     MCHC 29.3 (*)     RDW 18.5 (*)     Immature Granulocytes 1.4 (*)     Gran # (ANC) 7.8 (*)     Immature Grans (Abs) 0.18 (*)     Mono # 1.1 (*)     All other components within normal limits   COMPREHENSIVE METABOLIC PANEL - Abnormal; Notable for the following components:    CO2 34 (*)     Calcium 8.5 (*)     Albumin 2.8 (*)     All other components within normal limits   B-TYPE  NATRIURETIC PEPTIDE - Abnormal; Notable for the following components:     (*)     All other components within normal limits    Narrative:     ADD ON BNP #[id number] PER BRITNI ROGERS MD 03/13/2022  22:33    ISTAT PROCEDURE - Abnormal; Notable for the following components:    POC PH 7.118 (*)     POC PCO2 126.8 (*)     POC PO2 73 (*)     POC HCO3 41.0 (*)     POC SATURATED O2 86 (*)     POC TCO2 45 (*)     All other components within normal limits   ISTAT PROCEDURE - Abnormal; Notable for the following components:    POC PH 7.209 (*)     POC PCO2 104.6 (*)     POC PO2 27 (*)     POC HCO3 41.7 (*)     POC SATURATED O2 33 (*)     POC TCO2 45 (*)     All other components within normal limits   TROPONIN I   B-TYPE NATRIURETIC PEPTIDE   TROPONIN I    Narrative:     ADD ON TROPONIN PER DR BRITNI ROGERS/ORDER# [id number] @ 21:39 3/13/2022   SARS-COV-2 RDRP GENE    Narrative:     This test utilizes isothermal nucleic acid amplification   technology to detect the SARS-CoV-2 RdRp nucleic acid segment.   The analytical sensitivity (limit of detection) is 125 genome   equivalents/mL.   A POSITIVE result implies infection with the SARS-CoV-2 virus;   the patient is presumed to be contagious.     A NEGATIVE result means that SARS-CoV-2 nucleic acids are not   present above the limit of detection. A NEGATIVE result should be   treated as presumptive. It does not rule out the possibility of   COVID-19 and should not be the sole basis for treatment decisions.   If COVID-19 is strongly suspected based on clinical and exposure   history, re-testing using an alternate molecular assay should be   considered.   This test is only for use under the Food and Drug   Administration s Emergency Use Authorization (EUA).   Commercial kits are provided by [employer].   Performance characteristics of the EUA have been independently   verified by [facility] Department of   Pathology and Laboratory Medicine.    _________________________________________________________________   The authorized Fact Sheet for Healthcare Providers and the authorized Fact   Sheet for Patients of the ID NOW COVID-19 are available on the FDA   website:     https://www.fda.gov/media/487557/download  https://www.fda.gov/media/410691/download             EKG Readings: (Independently Interpreted)   EKG:  Sinus tachycardia at 102, normal intervals, normal axis, no ischemic changes     ECG Results          EKG 12-lead (In process)  Result time 03/14/22 07:39:26    In process by Interface, Lab In [facility] (03/14/22 07:39:26)                 Narrative:    Test Reason : R06.02,    Vent. Rate : 102 BPM     Atrial Rate : 102 BPM     P-R Int : 160 ms          QRS Dur : 086 ms      QT Int : 354 ms       P-R-T Axes : 083 079 085 degrees     QTc Int : 461 ms    Sinus tachycardia  Right atrial enlargement  Borderline Abnormal ECG  When compared with ECG of 05-MAR-2022 15:30,  No significant change was found    Referred By: [email or web address]   SELF           Confirmed By:                             Imaging Results          X-Ray Chest AP Portable (Final result)  Result time 03/13/22 19:56:28    Final result by Mckinley Talbert MD (03/13/22 19:56:28)                 Impression:      Left greater than right perihilar and upper lung zone predominant interval increased nonspecific interstitial coarsening which can be seen with worsening interstitial type pneumonia from inflammatory or infectious process including atypical bacterial or viral etiology versus more atypical distribution of pulmonary edema.  No large consolidation.      Electronically signed by: Mckinley Talbert MD  Date:    03/13/2022  Time:    19:56             Narrative:    EXAMINATION:  XR CHEST AP PORTABLE    CLINICAL HISTORY:  Shortness of breath    TECHNIQUE:  Single frontal view of the chest was performed.    COMPARISON:  Chest radiograph 03/05/2022    FINDINGS:  Monitoring leads and tubing overlie the  chest.    Cardiomediastinal silhouette is midline and stable.  Pulmonary vasculature and hilar contours are unchanged.  Few scattered linear opacities throughout the lungs consistent with minimal platelike scarring versus atelectasis.  Bilateral diffuse nonspecific interstitial coarsening with a perihilar and upper lobe predominance slightly more so on the left, with central peribronchial cuffing and overall increased from 03/05/2022 study.  The lungs are otherwise symmetrically hyperexpanded without large consolidation, pleural effusion or pneumothorax.  No acute osseous process seen.                              X-Rays:   Independently Interpreted Readings:   Other Readings:  Chest x-ray: L>R infiltrate    Medications   sodium chloride 0.9% flush 10 mL (has no administration in time range)   enoxaparin injection 40 mg (40 mg Subcutaneous Given 3/14/22 0032)   albuterol-ipratropium 2.5 mg-0.5 mg/3 mL nebulizer solution 3 mL (3 mLs Nebulization Given 3/14/22 1215)   predniSONE tablet 40 mg (40 mg Oral Given 3/14/22 0903)   carvediloL tablet 12.5 mg (12.5 mg Oral Given 3/14/22 0903)   pantoprazole EC tablet 40 mg (40 mg Oral Given 3/14/22 0903)   cefTRIAXone (ROCEPHIN) 1 g/50 mL D5W IVPB (has no administration in time range)   azithromycin tablet 500 mg (has no administration in time range)   albuterol sulfate nebulizer solution 15 mg ( Nebulization Canceled Entry 3/13/22 2030)   ipratropium 0.02 % nebulizer solution 1 mg (1 mg Nebulization Given 3/13/22 1922)   albuterol sulfate nebulizer solution 2.5 mg (2.5 mg Nebulization Given 3/13/22 2045)   azithromycin 500 mg in dextrose 5 % 250 mL IVPB (ready to mix system) (0 mg Intravenous Stopped 3/13/22 2201)   albuterol-ipratropium 2.5 mg-0.5 mg/3 mL nebulizer solution 10 mL (10 mLs Nebulization Given 3/13/22 2045)   cefTRIAXone (ROCEPHIN) 1 g/50 mL D5W IVPB ( Intravenous Stopped 3/13/22 2232)     Medical Decision Making:   History:   Old Medical Records: I decided to  obtain old medical records.  Old Records Summarized: records from previous admission(s).       <> Summary of Records: Last seen in ED for COPD 3/5, admitted for decompensated failure.   Initial Assessment:   Emergent evaluation of 61-year-old male presenting today with shortness of breath, history of COPD.  Treated in the field with multiple medications and brought in on CPAP.  Patient transitioned to BiPAP.  Continues to have poor air entry bilaterally.    Albuterol 15 mg continuous nebulizer ordered.        Differential Diagnosis:   COPD exacerbation, pneumonia, bronchitis,  Independently Interpreted Test(s):   I have ordered and independently interpreted X-rays - see prior notes.  I have ordered and independently interpreted EKG Reading(s) - see prior notes  Clinical Tests:   Lab Tests: Reviewed and Ordered  Radiological Study: Ordered and Reviewed  Medical Tests: Ordered and Reviewed  ED Management:  - labs  - EKG  - chest x-ray  - albuterol 15 mg nebulizer, ipratropium 1 mg  - VBG            Attending Attestation:         Attending Critical Care:   Critical Care Times:   ==============================================================  · Total Critical Care Time - exclusive of procedural time: 35 minutes.  ==============================================================  Critical care was time spent personally by me on the following activities: examination of patient, review of old charts, ordering lab, x-rays, and/or EKG, discussion with consultants and re-evaluation of patient's conition.           ED Course as of 03/14/22 1348   Sun Mar 13, 2022   2045 Repeat VBG after continuous nebulizer 7.2/102  Under evaluation, patient much more alert, answering questions.  Asking for water but is currently on BiPAP [GM]   2155 ICU consulted, at bedside for evaluation [GM]      ED Course User Index  [GM] Trena Aguirre MD      pt admitted to ICU for further care        Clinical Impression:   Final diagnoses:  [R06.02] SOB  (shortness of breath)  [J44.1] COPD exacerbation (Primary)  [J96.21, J96.22] Acute on chronic respiratory failure with hypoxia and hypercapnia  [Z99.89] BiPAP (biphasic positive airway pressure) dependence          ED Disposition Condition    Admit               Trena Aguirre MD  03/14/22 1942

## 2022-03-14 NOTE — SUBJECTIVE & OBJECTIVE
Past Medical History:   Diagnosis Date    Asthma     Atrial fibrillation with rapid ventricular response     CHF (congestive heart failure)     COPD (chronic obstructive pulmonary disease)     home O2 at night only    Coronary artery disease     Hypertension     Lung nodule     Pneumonia     Seizures        Past Surgical History:   Procedure Laterality Date    ESOPHAGOGASTRODUODENOSCOPY N/A 2/11/2022    Procedure: EGD (ESOPHAGOGASTRODUODENOSCOPY);  Surgeon: Cain Ortega MD;  Location: [facility] ENDO ([address] [place] [address]);  Service: Endoscopy;  Laterality: N/A;    LEFT HEART CATHETERIZATION  4/23/2020    Procedure: Left heart cath;  Surgeon: Nic Pollack MD;  Location: [facility] CATH LAB;  Service: Cardiology;;       Review of patient's allergies indicates:   Allergen Reactions    Benazepril Swelling    Duoneb [ipratropium-albuterol]      Report minor Tremors, pt seems to be tolerating well.       Family History       Problem Relation (Age of Onset)    Cancer Father    Hypertension Sister    Stroke Sister, Brother          Tobacco Use    Smoking status: Current Some Day Smoker     Packs/day: 0.25     Types: Cigarettes    Smokeless tobacco: Never Used    Tobacco comment: 1-2 per day   Substance and Sexual Activity    Alcohol use: Yes     Alcohol/week: 2.0 standard drinks     Types: 2 Cans of beer per week     Comment: every night    Drug use: No    Sexual activity: Not Currently      Review of Systems   Unable to perform ROS: Other (on continuous bipap)   Respiratory:  Positive for cough, shortness of breath and wheezing.    Cardiovascular:  Negative for chest pain, palpitations and leg swelling.   Gastrointestinal:  Negative for abdominal pain, nausea and vomiting.   Genitourinary:  Negative for difficulty urinating and dysuria.   Objective:     Vital Signs (Most Recent):  Temp: 98 °F (36.7 °C) (03/13/22 1924)  Pulse: 80 (03/13/22 2236)  Resp: (!) 24 (03/13/22 2236)  BP: 136/81 (03/13/22 2202)  SpO2: (!) 92 % (03/13/22 2236)    Vital Signs (24h Range):  Temp:  [98 °F (36.7 °C)] 98 °F (36.7 °C)  Pulse:  [] 80  Resp:  [20-30] 24  SpO2:  [92 %-100 %] 92 %  BP: (136-156)/(72-89) 136/81   Weight: 64 kg (141 lb 1.5 oz)  Body mass index is 22.1 kg/m².      Intake/Output Summary (Last 24 hours) at 3/13/2022 2321  Last data filed at 3/13/2022 2233  Gross per 24 hour   Intake 298.68 ml   Output --   Net 298.68 ml       Physical Exam  Vitals reviewed.   Constitutional:       Appearance: He is well-developed.      Interventions: Face mask in place.   HENT:      Head: Normocephalic and atraumatic.   Eyes:      Pupils: Pupils are equal, round, and reactive to light.   Neck:      Thyroid: No thyromegaly.      Trachea: No tracheal deviation.   Cardiovascular:      Rate and Rhythm: Normal rate and regular rhythm.      Heart sounds: Heart sounds are distant. No murmur heard.    No friction rub. No gallop.   Pulmonary:      Effort: Pulmonary effort is normal.      Breath sounds: Examination of the left-upper field reveals wheezing. Examination of the left-middle field reveals wheezing. Examination of the left-lower field reveals wheezing. Wheezing present. No rhonchi or rales.   Abdominal:      General: Bowel sounds are normal.      Palpations: Abdomen is soft.      Tenderness: There is no abdominal tenderness.   Musculoskeletal:         General: Normal range of motion.      Right lower leg: No edema.      Left lower leg: No edema.   Skin:     General: Skin is warm and dry.   Neurological:      Mental Status: He is oriented to person, place, and time.      Sensory: No sensory deficit.   Psychiatric:         Behavior: Behavior is cooperative.       Vents:  Oxygen Concentration (%): 30 (03/13/22 1924)  Lines/Drains/Airways       Peripheral Intravenous Line  Duration                  Peripheral IV - Single Lumen 03/13/22 1925 18 G Right Antecubital <1 day         Peripheral IV - Single Lumen 03/13/22 2041 18 G Left Hand <1 day                  Significant  Labs:    CBC/Anemia Profile:  Recent Labs   Lab 03/13/22 1924   WBC 12.81*   HGB 12.5*   HCT 42.7      MCV 89   RDW 18.5*        Chemistries:  Recent Labs   Lab 03/13/22 2020      K 3.5      CO2 34*   BUN 15   CREATININE 1.0   CALCIUM 8.5*   ALBUMIN 2.8*   PROT 6.4   BILITOT 0.2   ALKPHOS 86   ALT 38   AST 31       All pertinent labs within the past 24 hours have been reviewed.    Significant Imaging: I have reviewed all pertinent imaging results/findings within the past 24 hours.

## 2022-03-14 NOTE — CONSULTS
Consult received. Patient to be admitted to the MICU. Full H&P to follow.    Maria Del Carmen Bell PA-C  Critical Care Medicine  3/13/2022   10:16 PM

## 2022-03-14 NOTE — ASSESSMENT & PLAN NOTE
On home oxygen.     -Wean O2 as tolerated to keep O2 saturations >88%  -Continue CAP coverage with ceftriaxone and azithromycin  -Continue prednisone  -Scheduled duonebs q 6 hrs

## 2022-03-14 NOTE — SUBJECTIVE & OBJECTIVE
Interval History: Pt tolerating 4LNC. Only requiring bipap while sleeping. Consulted case management for bipap at home. Started on CAP and prednisone.     Objective:     Vital Signs (Most Recent):  Temp: 97.7 °F (36.5 °C) (03/14/22 0301)  Pulse: 73 (03/14/22 1215)  Resp: (!) 26 (03/14/22 1215)  BP: (!) 198/92 (03/14/22 0903)  SpO2: 96 % (03/14/22 1215)   Vital Signs (24h Range):  Temp:  [97.7 °F (36.5 °C)-98 °F (36.7 °C)] 97.7 °F (36.5 °C)  Pulse:  [] 73  Resp:  [13-45] 26  SpO2:  [89 %-100 %] 96 %  BP: (128-198)/(67-98) 198/92     Weight: 64 kg (141 lb 1.5 oz)  Body mass index is 22.1 kg/m².      Intake/Output Summary (Last 24 hours) at 3/14/2022 1345  Last data filed at 3/14/2022 1100  Gross per 24 hour   Intake 298.68 ml   Output 375 ml   Net -76.32 ml       Physical Exam  Vitals and nursing note reviewed.   Constitutional:       Appearance: Normal appearance.   HENT:      Mouth/Throat:      Mouth: Mucous membranes are moist.      Comments: Poor dentition  Eyes:      Pupils: Pupils are equal, round, and reactive to light.   Cardiovascular:      Rate and Rhythm: Normal rate and regular rhythm.      Pulses: Normal pulses.      Heart sounds: Normal heart sounds.   Pulmonary:      Effort: Pulmonary effort is normal.      Breath sounds: Wheezing present.   Abdominal:      General: Abdomen is flat. Bowel sounds are normal.      Palpations: Abdomen is soft.   Genitourinary:     Comments: Clear yellow urine  Musculoskeletal:         General: Normal range of motion.   Skin:     General: Skin is warm and dry.   Neurological:      General: No focal deficit present.      Mental Status: He is alert and oriented to person, place, and time.       Vents:  Oxygen Concentration (%): 30 (03/14/22 1215)    Lines/Drains/Airways       Peripheral Intravenous Line  Duration                  Peripheral IV - Single Lumen 03/13/22 1925 18 G Right Antecubital <1 day         Peripheral IV - Single Lumen 03/13/22 2041 18 G Left Hand <1  day                    Significant Labs:    CBC/Anemia Profile:  Recent Labs   Lab 03/13/22  1924 03/14/22  0552   WBC 12.81* 7.95   HGB 12.5* 11.4*   HCT 42.7 37.0*    236   MCV 89 86   RDW 18.5* 18.1*        Chemistries:  Recent Labs   Lab 03/13/22 2020 03/14/22  0552    141   K 3.5 4.2    96   CO2 34* 38*   BUN 15 16   CREATININE 1.0 0.8   CALCIUM 8.5* 9.0   ALBUMIN 2.8* 2.7*   PROT 6.4 6.1   BILITOT 0.2 0.3   ALKPHOS 86 82   ALT 38 30   AST 31 19   MG  --  2.1   PHOS  --  3.2       All pertinent labs within the past 24 hours have been reviewed.    Significant Imaging:  I have reviewed all pertinent imaging results/findings within the past 24 hours.

## 2022-03-14 NOTE — HPI
Mei Woodard is a 61 year old male with severe COPD (FEV1 29% predicted, 0.86L) on 3L home O2, HTN, HFpEF (EF 55% 2022), CAD, and HTN who presented to [facility] ED on 3/13/2022 for SOB for 24h. Patient was admitted from 3/5-3/7 for a COPD exacerbation. This presentation the patient was admitted to MICU for hypercapnic respiratory failure due to COPD exacerbation not requiring intubation. Patient was eventually stepped down and is awaiting discharge.      Patient is scheduled to undergo EGD on 4/8 and his gastroenterologist is requesting pulmonary clearance/risk stratification for the outpatient procedure. Patient is unlikely to get into pulmonology clinic in time for his procedure and thus pulmonology was consulted inpatient for risk stratification.

## 2022-03-14 NOTE — H&P
[facility] - [facility] ([place] [address])  Critical Care Medicine  History & Physical    Patient Name: Baltazar Mccray  MRN: [id number]  Admission Date: 3/13/2022  Hospital Length of Stay: 0 days  Code Status: Full Code  Attending Physician: Marcell Celestin MD   Primary Care Provider: Rhonda Of Katia   Principal Problem: Acute on chronic respiratory failure with hypercapnia    Subjective:     HPI:  Mei Woodard is a 61 year old male with COPD on 3L home O2, HTN, HFpEF (EF 55% 2022), CAD, and HTN who presented to [facility] ED on 3/13/2022 for SOB for last day. Patient reports symptoms started yesterday afternoon. He activated EMS today due to worsening symptoms. Per EMS, patient was found to be hypoxic with increased WOB. He received 1 DuoNeb, Solu-Medrol 125mg, magnesium 2 g and 2.5 of Versed for acute agitation. He was placed on CPAP and transported to the emergency department. States his rescue inhalers did not improve his symptoms. Patient has a chronic nonproductive cough which remains at baseline. Denies chest pain, abdominal pain, N/V, or LE edema. Patient continues to smoke. Of note, patient frequently hospitalized for COPD exacerbation, most recent 3/5/22 - 3/7/22.     In the ED, patient was transitioned to bipap. ABG significant for hypercapnic respiratory failure. CXR with hyperinflation but no signs of focal consolidation. ABG improved on bipap but respiratory status remained tenuous. Critical care medicine consulted and patient admitted to the MICU for acute on chronic hypercapnic respiratory failure 2/2 COPD exacerbation.       Hospital/ICU Course:  No notes on file     Past Medical History:   Diagnosis Date    Asthma     Atrial fibrillation with rapid ventricular response     CHF (congestive heart failure)     COPD (chronic obstructive pulmonary disease)     home O2 at night only    Coronary artery disease     Hypertension     Lung nodule     Pneumonia     Seizures        Past Surgical  History:   Procedure Laterality Date    ESOPHAGOGASTRODUODENOSCOPY N/A 2/11/2022    Procedure: EGD (ESOPHAGOGASTRODUODENOSCOPY);  Surgeon: Cain Ortega MD;  Location: [facility] ENDO ([address] [place] [address]);  Service: Endoscopy;  Laterality: N/A;    LEFT HEART CATHETERIZATION  4/23/2020    Procedure: Left heart cath;  Surgeon: Nic Pollack MD;  Location: [facility] CATH LAB;  Service: Cardiology;;       Review of patient's allergies indicates:   Allergen Reactions    Benazepril Swelling    Duoneb [ipratropium-albuterol]      Report minor Tremors, pt seems to be tolerating well.       Family History       Problem Relation (Age of Onset)    Cancer Father    Hypertension Sister    Stroke Sister, Brother          Tobacco Use    Smoking status: Current Some Day Smoker     Packs/day: 0.25     Types: Cigarettes    Smokeless tobacco: Never Used    Tobacco comment: 1-2 per day   Substance and Sexual Activity    Alcohol use: Yes     Alcohol/week: 2.0 standard drinks     Types: 2 Cans of beer per week     Comment: every night    Drug use: No    Sexual activity: Not Currently      Review of Systems   Unable to perform ROS: Other (on continuous bipap)   Respiratory:  Positive for cough, shortness of breath and wheezing.    Cardiovascular:  Negative for chest pain, palpitations and leg swelling.   Gastrointestinal:  Negative for abdominal pain, nausea and vomiting.   Genitourinary:  Negative for difficulty urinating and dysuria.   Objective:     Vital Signs (Most Recent):  Temp: 98 °F (36.7 °C) (03/13/22 1924)  Pulse: 80 (03/13/22 2236)  Resp: (!) 24 (03/13/22 2236)  BP: 136/81 (03/13/22 2202)  SpO2: (!) 92 % (03/13/22 2236)   Vital Signs (24h Range):  Temp:  [98 °F (36.7 °C)] 98 °F (36.7 °C)  Pulse:  [] 80  Resp:  [20-30] 24  SpO2:  [92 %-100 %] 92 %  BP: (136-156)/(72-89) 136/81   Weight: 64 kg (141 lb 1.5 oz)  Body mass index is 22.1 kg/m².      Intake/Output Summary (Last 24 hours) at 3/13/2022 2327  Last data filed at  3/13/2022 2233  Gross per 24 hour   Intake 298.68 ml   Output --   Net 298.68 ml       Physical Exam  Vitals reviewed.   Constitutional:       Appearance: He is well-developed.      Interventions: Face mask in place.   HENT:      Head: Normocephalic and atraumatic.   Eyes:      Pupils: Pupils are equal, round, and reactive to light.   Neck:      Thyroid: No thyromegaly.      Trachea: No tracheal deviation.   Cardiovascular:      Rate and Rhythm: Normal rate and regular rhythm.      Heart sounds: Heart sounds are distant. No murmur heard.    No friction rub. No gallop.   Pulmonary:      Effort: Pulmonary effort is normal.      Breath sounds: Examination of the left-upper field reveals wheezing. Examination of the left-middle field reveals wheezing. Examination of the left-lower field reveals wheezing. Wheezing present. No rhonchi or rales.   Abdominal:      General: Bowel sounds are normal.      Palpations: Abdomen is soft.      Tenderness: There is no abdominal tenderness.   Musculoskeletal:         General: Normal range of motion.      Right lower leg: No edema.      Left lower leg: No edema.   Skin:     General: Skin is warm and dry.   Neurological:      Mental Status: He is oriented to person, place, and time.      Sensory: No sensory deficit.   Psychiatric:         Behavior: Behavior is cooperative.       Vents:  Oxygen Concentration (%): 30 (03/13/22 1924)  Lines/Drains/Airways       Peripheral Intravenous Line  Duration                  Peripheral IV - Single Lumen 03/13/22 1925 18 G Right Antecubital <1 day         Peripheral IV - Single Lumen 03/13/22 2041 18 G Left Hand <1 day                  Significant Labs:    CBC/Anemia Profile:  Recent Labs   Lab 03/13/22 1924   WBC 12.81*   HGB 12.5*   HCT 42.7      MCV 89   RDW 18.5*        Chemistries:  Recent Labs   Lab 03/13/22 2020      K 3.5      CO2 34*   BUN 15   CREATININE 1.0   CALCIUM 8.5*   ALBUMIN 2.8*   PROT 6.4   BILITOT 0.2    ALKPHOS 86   ALT 38   AST 31       All pertinent labs within the past 24 hours have been reviewed.    Significant Imaging: I have reviewed all pertinent imaging results/findings within the past 24 hours.    Assessment/Plan:     Pulmonary  * Acute on chronic respiratory failure with hypercapnia  Patient with Hypercapnic Respiratory failure which is Acute on chronic.  he is on home oxygen at 3 LPM. Supplemental oxygen was provided and noted- Oxygen Concentration (%):  [30] 30.   Signs/symptoms of respiratory failure include- tachypnea, increased work of breathing and wheezing. Contributing diagnoses includes - COPD Labs and images were reviewed. Patient Has recent ABG, which has been reviewed. Will treat underlying causes and adjust management of respiratory failure as follows-    Baseline pCO2 in mid 70s. Arrived with PCO2 128. Does not follow with pulmonary as an outpatient.   Uses albuterol, spiriva and Bretzi Areosphere as an outpatient    --Continue bipap  --duonebs  --azithromycin x3 days  --prednisone x5 days  --needs pulmonary follow up at discharge    Chronic obstructive pulmonary disease  On 2 L home O2. FEV1 27% but also moderate restriction on PFTs.   See respiratory failure    COPD exacerbation  See respiratory failure      Cardiac/Vascular  Chronic diastolic heart failure  TTE EF 55% with grade 1 diastolic dysfunction and mild tricuspid regurgitation  Takes lasix 20 mg as outpatient    --Continue lasix    Essential hypertension  Continue home coreg    Other  Tobacco abuse  Reports smoking every other day  Smoking cessation  Nicotine patch if needed    Discussed with Dr. Ahumada.      Critical Care Time: 50 minutes  Critical secondary to Patient has a condition that poses threat to life and bodily function: acute on chronic hypercapnic respiratory failure on continuous bipap.      Critical care was time spent personally by me on the following activities: development of treatment plan with patient or  surrogate and bedside caregivers, discussions with consultants, evaluation of patient's response to treatment, examination of patient, ordering and performing treatments and interventions, ordering and review of laboratory studies, ordering and review of radiographic studies, pulse oximetry, re-evaluation of patient's condition. This critical care time did not overlap with that of any other provider or involve time for any procedures.     Maria Del Carmen Bell PA-C  Critical Care Medicine  [facility] - Intensive Care ([facility])

## 2022-03-15 LAB
ALBUMIN SERPL BCP-MCNC: 2.5 G/DL (ref 3.5–5.2)
ALP SERPL-CCNC: 67 U/L (ref 55–135)
ALT SERPL W/O P-5'-P-CCNC: 25 U/L (ref 10–44)
ANION GAP SERPL CALC-SCNC: 11 MMOL/L (ref 8–16)
AST SERPL-CCNC: 15 U/L (ref 10–40)
BASOPHILS # BLD AUTO: 0.01 K/UL (ref 0–0.2)
BASOPHILS NFR BLD: 0.1 % (ref 0–1.9)
BILIRUB SERPL-MCNC: 0.2 MG/DL (ref 0.1–1)
BUN SERPL-MCNC: 18 MG/DL (ref 6–20)
CALCIUM SERPL-MCNC: 8.9 MG/DL (ref 8.7–10.5)
CHLORIDE SERPL-SCNC: 98 MMOL/L (ref 95–110)
CO2 SERPL-SCNC: 30 MMOL/L (ref 23–29)
CREAT SERPL-MCNC: 0.7 MG/DL (ref 0.5–1.4)
DIFFERENTIAL METHOD: ABNORMAL
EOSINOPHIL # BLD AUTO: 0 K/UL (ref 0–0.5)
EOSINOPHIL NFR BLD: 0.1 % (ref 0–8)
ERYTHROCYTE [DISTWIDTH] IN BLOOD BY AUTOMATED COUNT: 17.7 % (ref 11.5–14.5)
EST. GFR  (AFRICAN AMERICAN): >60 ML/MIN/1.73 M^2
EST. GFR  (NON AFRICAN AMERICAN): >60 ML/MIN/1.73 M^2
GLUCOSE SERPL-MCNC: 99 MG/DL (ref 70–110)
HCT VFR BLD AUTO: 34.8 % (ref 40–54)
HGB BLD-MCNC: 10.8 G/DL (ref 14–18)
IMM GRANULOCYTES # BLD AUTO: 0.11 K/UL (ref 0–0.04)
IMM GRANULOCYTES NFR BLD AUTO: 1.1 % (ref 0–0.5)
LYMPHOCYTES # BLD AUTO: 1.5 K/UL (ref 1–4.8)
LYMPHOCYTES NFR BLD: 14.3 % (ref 18–48)
MAGNESIUM SERPL-MCNC: 2 MG/DL (ref 1.6–2.6)
MCH RBC QN AUTO: 26 PG (ref 27–31)
MCHC RBC AUTO-ENTMCNC: 31 G/DL (ref 32–36)
MCV RBC AUTO: 84 FL (ref 82–98)
MONOCYTES # BLD AUTO: 0.9 K/UL (ref 0.3–1)
MONOCYTES NFR BLD: 8.5 % (ref 4–15)
NEUTROPHILS # BLD AUTO: 7.8 K/UL (ref 1.8–7.7)
NEUTROPHILS NFR BLD: 75.9 % (ref 38–73)
NRBC BLD-RTO: 0 /100 WBC
PHOSPHATE SERPL-MCNC: 2.7 MG/DL (ref 2.7–4.5)
PLATELET # BLD AUTO: 251 K/UL (ref 150–450)
PMV BLD AUTO: 11.2 FL (ref 9.2–12.9)
POTASSIUM SERPL-SCNC: 4.1 MMOL/L (ref 3.5–5.1)
PROT SERPL-MCNC: 5.6 G/DL (ref 6–8.4)
RBC # BLD AUTO: 4.16 M/UL (ref 4.6–6.2)
SODIUM SERPL-SCNC: 139 MMOL/L (ref 136–145)
WBC # BLD AUTO: 10.31 K/UL (ref 3.9–12.7)

## 2022-03-15 PROCEDURE — 94640 AIRWAY INHALATION TREATMENT: CPT

## 2022-03-15 PROCEDURE — 99900035 HC TECH TIME PER 15 MIN (STAT)

## 2022-03-15 PROCEDURE — 25000242 PHARM REV CODE 250 ALT 637 W/ HCPCS: Performed by: PHYSICIAN ASSISTANT

## 2022-03-15 PROCEDURE — 25000003 PHARM REV CODE 250: Performed by: NURSE PRACTITIONER

## 2022-03-15 PROCEDURE — 85025 COMPLETE CBC W/AUTO DIFF WBC: CPT | Performed by: PHYSICIAN ASSISTANT

## 2022-03-15 PROCEDURE — 97530 THERAPEUTIC ACTIVITIES: CPT

## 2022-03-15 PROCEDURE — 97535 SELF CARE MNGMENT TRAINING: CPT

## 2022-03-15 PROCEDURE — 97161 PT EVAL LOW COMPLEX 20 MIN: CPT

## 2022-03-15 PROCEDURE — 63600175 PHARM REV CODE 636 W HCPCS: Performed by: PHYSICIAN ASSISTANT

## 2022-03-15 PROCEDURE — 63700000 PHARM REV CODE 250 ALT 637 W/O HCPCS: Performed by: NURSE PRACTITIONER

## 2022-03-15 PROCEDURE — 94761 N-INVAS EAR/PLS OXIMETRY MLT: CPT

## 2022-03-15 PROCEDURE — 94660 CPAP INITIATION&MGMT: CPT

## 2022-03-15 PROCEDURE — 80053 COMPREHEN METABOLIC PANEL: CPT | Performed by: PHYSICIAN ASSISTANT

## 2022-03-15 PROCEDURE — 25000003 PHARM REV CODE 250: Performed by: PHYSICIAN ASSISTANT

## 2022-03-15 PROCEDURE — 25000003 PHARM REV CODE 250: Performed by: INTERNAL MEDICINE

## 2022-03-15 PROCEDURE — 27000221 HC OXYGEN, UP TO 24 HOURS

## 2022-03-15 PROCEDURE — 11000001 HC ACUTE MED/SURG PRIVATE ROOM

## 2022-03-15 PROCEDURE — 99233 SBSQ HOSP IP/OBS HIGH 50: CPT | Mod: ,,, | Performed by: INTERNAL MEDICINE

## 2022-03-15 PROCEDURE — 83735 ASSAY OF MAGNESIUM: CPT | Performed by: PHYSICIAN ASSISTANT

## 2022-03-15 PROCEDURE — 99233 PR SUBSEQUENT HOSPITAL CARE,LEVL III: ICD-10-PCS | Mod: ,,, | Performed by: INTERNAL MEDICINE

## 2022-03-15 PROCEDURE — 97165 OT EVAL LOW COMPLEX 30 MIN: CPT

## 2022-03-15 PROCEDURE — 63600175 PHARM REV CODE 636 W HCPCS: Performed by: NURSE PRACTITIONER

## 2022-03-15 PROCEDURE — 84100 ASSAY OF PHOSPHORUS: CPT | Performed by: PHYSICIAN ASSISTANT

## 2022-03-15 RX ORDER — CLONIDINE HYDROCHLORIDE 0.1 MG/1
0.1 TABLET ORAL 3 TIMES DAILY PRN
Status: DISCONTINUED | OUTPATIENT
Start: 2022-03-15 | End: 2022-04-01 | Stop reason: HOSPADM

## 2022-03-15 RX ORDER — FUROSEMIDE 20 MG/1
20 TABLET ORAL DAILY
Status: DISCONTINUED | OUTPATIENT
Start: 2022-03-15 | End: 2022-04-01 | Stop reason: HOSPADM

## 2022-03-15 RX ORDER — FUROSEMIDE 20 MG/1
20 TABLET ORAL DAILY
Status: DISCONTINUED | OUTPATIENT
Start: 2022-03-16 | End: 2022-03-15

## 2022-03-15 RX ORDER — HYDROCHLOROTHIAZIDE 12.5 MG/1
12.5 TABLET ORAL DAILY
Status: DISCONTINUED | OUTPATIENT
Start: 2022-03-15 | End: 2022-03-15

## 2022-03-15 RX ADMIN — SIMETHICONE 80 MG: 80 TABLET, CHEWABLE ORAL at 05:03

## 2022-03-15 RX ADMIN — ENOXAPARIN SODIUM 40 MG: 40 INJECTION SUBCUTANEOUS at 05:03

## 2022-03-15 RX ADMIN — PREDNISONE 40 MG: 20 TABLET ORAL at 08:03

## 2022-03-15 RX ADMIN — CARVEDILOL 12.5 MG: 12.5 TABLET, FILM COATED ORAL at 05:03

## 2022-03-15 RX ADMIN — HYDROCHLOROTHIAZIDE 12.5 MG: 12.5 TABLET ORAL at 08:03

## 2022-03-15 RX ADMIN — SIMETHICONE 80 MG: 80 TABLET, CHEWABLE ORAL at 10:03

## 2022-03-15 RX ADMIN — IPRATROPIUM BROMIDE AND ALBUTEROL SULFATE 3 ML: 2.5; .5 SOLUTION RESPIRATORY (INHALATION) at 07:03

## 2022-03-15 RX ADMIN — IPRATROPIUM BROMIDE AND ALBUTEROL SULFATE 3 ML: 2.5; .5 SOLUTION RESPIRATORY (INHALATION) at 08:03

## 2022-03-15 RX ADMIN — CEFTRIAXONE 1 G: 1 INJECTION, SOLUTION INTRAVENOUS at 11:03

## 2022-03-15 RX ADMIN — CARVEDILOL 12.5 MG: 12.5 TABLET, FILM COATED ORAL at 07:03

## 2022-03-15 RX ADMIN — PANTOPRAZOLE SODIUM 40 MG: 40 TABLET, DELAYED RELEASE ORAL at 10:03

## 2022-03-15 RX ADMIN — FUROSEMIDE 20 MG: 20 TABLET ORAL at 12:03

## 2022-03-15 RX ADMIN — AZITHROMYCIN MONOHYDRATE 500 MG: 250 TABLET ORAL at 08:03

## 2022-03-15 RX ADMIN — CLONIDINE HYDROCHLORIDE 0.1 MG: 0.1 TABLET ORAL at 12:03

## 2022-03-15 RX ADMIN — PANTOPRAZOLE SODIUM 40 MG: 40 TABLET, DELAYED RELEASE ORAL at 08:03

## 2022-03-15 RX ADMIN — IPRATROPIUM BROMIDE AND ALBUTEROL SULFATE 3 ML: 2.5; .5 SOLUTION RESPIRATORY (INHALATION) at 01:03

## 2022-03-15 RX ADMIN — MUPIROCIN: 20 OINTMENT TOPICAL at 10:03

## 2022-03-15 NOTE — HOSPITAL COURSE
Pt admitted to MICU on 03/13. Weaned from Bipap 03/14. Pt has been stepped down to hospital medicine, waiting on bed. Case management consult for home Bipap.

## 2022-03-15 NOTE — ASSESSMENT & PLAN NOTE
Patient with Hypercapnic Respiratory failure which is Acute on chronic.  he is on home oxygen at 3 LPM. Supplemental oxygen was provided and noted- Oxygen Concentration (%):  [30] 30.   Signs/symptoms of respiratory failure include- tachypnea, increased work of breathing and wheezing. Contributing diagnoses includes - COPD Labs and images were reviewed. Patient Has recent ABG, which has been reviewed. Will treat underlying causes and adjust management of respiratory failure as follows-    Baseline pCO2 in mid 70s. Arrived with PCO2 128. Does not follow with pulmonary as an outpatient.   Uses albuterol, spiriva and Bretzi Areosphere as an outpatient    --Continue bipap at night and when napping  --duonebs  --Ceftriaxone/azithromycin x3 days  --prednisone x5 days  --needs pulmonary follow up at discharge

## 2022-03-15 NOTE — SUBJECTIVE & OBJECTIVE
Interval History/Significant Events: No acute events overnight. Pt tolerated Bipap while sleeping. Waiting for a bed. Case management consulted for assistance with home Bipap    Review of Systems   Respiratory:  Positive for cough and shortness of breath.    Cardiovascular:  Negative for palpitations and leg swelling.   Gastrointestinal:  Positive for abdominal pain. Negative for nausea and vomiting.   Genitourinary:  Negative for decreased urine volume and hematuria.   Musculoskeletal:  Negative for back pain and neck pain.   Skin:  Negative for rash and wound.   Neurological:  Negative for seizures and numbness.   Psychiatric/Behavioral:  Negative for confusion. The patient is not nervous/anxious.    All other systems reviewed and are negative.  Objective:     Vital Signs (Most Recent):  Temp: 98 °F (36.7 °C) (03/15/22 0300)  Pulse: 69 (03/15/22 1000)  Resp: (!) 22 (03/15/22 1000)  BP: (!) 165/80 (03/15/22 1000)  SpO2: 100 % (03/15/22 1000)   Vital Signs (24h Range):  Temp:  [97.8 °F (36.6 °C)-98 °F (36.7 °C)] 98 °F (36.7 °C)  Pulse:  [60-88] 69  Resp:  [16-49] 22  SpO2:  [91 %-100 %] 100 %  BP: (134-217)/(72-97) 165/80   Weight: 64 kg (141 lb 1.5 oz)  Body mass index is 22.1 kg/m².      Intake/Output Summary (Last 24 hours) at 3/15/2022 1139  Last data filed at 3/15/2022 0600  Gross per 24 hour   Intake 652.42 ml   Output 1000 ml   Net -347.58 ml       Physical Exam  Vitals and nursing note reviewed.   Constitutional:       Appearance: Normal appearance.   HENT:      Mouth/Throat:      Mouth: Mucous membranes are moist.   Eyes:      Pupils: Pupils are equal, round, and reactive to light.   Cardiovascular:      Rate and Rhythm: Normal rate and regular rhythm.      Pulses: Normal pulses.      Heart sounds: Normal heart sounds.   Pulmonary:      Effort: Pulmonary effort is normal.      Breath sounds: Normal breath sounds.      Comments: Coarse breath sounds, no wheezing   On 3LNC  Abdominal:      General: Abdomen is  flat.      Palpations: Abdomen is soft.   Skin:     General: Skin is warm and dry.   Neurological:      General: No focal deficit present.      Mental Status: He is alert and oriented to person, place, and time.       Vents:  Oxygen Concentration (%): 30 (03/15/22 0500)  Lines/Drains/Airways       Peripheral Intravenous Line  Duration                  Peripheral IV - Single Lumen 03/13/22 1925 18 G Right Antecubital 1 day         Peripheral IV - Single Lumen 03/13/22 2041 18 G Left Hand 1 day                  Significant Labs:    CBC/Anemia Profile:  Recent Labs   Lab 03/13/22 1924 03/14/22  0552 03/15/22  0419   WBC 12.81* 7.95 10.31   HGB 12.5* 11.4* 10.8*   HCT 42.7 37.0* 34.8*    236 251   MCV 89 86 84   RDW 18.5* 18.1* 17.7*        Chemistries:  Recent Labs   Lab 03/13/22 2020 03/14/22  0552 03/15/22  0419    141 139   K 3.5 4.2 4.1    96 98   CO2 34* 38* 30*   BUN 15 16 18   CREATININE 1.0 0.8 0.7   CALCIUM 8.5* 9.0 8.9   ALBUMIN 2.8* 2.7* 2.5*   PROT 6.4 6.1 5.6*   BILITOT 0.2 0.3 0.2   ALKPHOS 86 82 67   ALT 38 30 25   AST 31 19 15   MG  --  2.1 2.0   PHOS  --  3.2 2.7       All pertinent labs within the past 24 hours have been reviewed.    Significant Imaging:  I have reviewed all pertinent imaging results/findings within the past 24 hours.

## 2022-03-15 NOTE — PROGRESS NOTES
[facility] - [facility] ([place] [address])  Critical Care Medicine  Progress Note    Patient Name: Baltazar Mccray  MRN: [id number]  Admission Date: 3/13/2022  Hospital Length of Stay: 2 days  Code Status: Full Code  Attending Provider: Marcell Celestin MD  Primary Care Provider: Rhonda Of Katia   Principal Problem: Acute on chronic respiratory failure with hypercapnia    Subjective:     HPI:  Mei Woodard is a 61 year old male with COPD on 3L home O2, HTN, HFpEF (EF 55% 2022), CAD, and HTN who presented to [facility] ED on 3/13/2022 for SOB for last day. Patient reports symptoms started yesterday afternoon. He activated EMS today due to worsening symptoms. Per EMS, patient was found to be hypoxic with increased WOB. He received 1 DuoNeb, Solu-Medrol 125mg, magnesium 2 g and 2.5 of Versed for acute agitation. He was placed on CPAP and transported to the emergency department. States his rescue inhalers did not improve his symptoms. Patient has a chronic nonproductive cough which remains at baseline. Denies chest pain, abdominal pain, N/V, or LE edema. Patient continues to smoke. Of note, patient frequently hospitalized for COPD exacerbation, most recent 3/5/22 - 3/7/22.     In the ED, patient was transitioned to bipap. ABG significant for hypercapnic respiratory failure. CXR with hyperinflation but no signs of focal consolidation. ABG improved on bipap but respiratory status remained tenuous. Critical care medicine consulted and patient admitted to the MICU for acute on chronic hypercapnic respiratory failure 2/2 COPD exacerbation.       Hospital/ICU Course:  Pt admitted to MICU on 03/13. Weaned from Bipap 03/14. Pt has been stepped down to hospital medicine, waiting on bed. Case management consult for home Bipap.       Interval History/Significant Events: No acute events overnight. Pt tolerated Bipap while sleeping. Waiting for a bed. Case management consulted for assistance with home Bipap    Review of Systems    Respiratory:  Positive for cough and shortness of breath.    Cardiovascular:  Negative for palpitations and leg swelling.   Gastrointestinal:  Positive for abdominal pain. Negative for nausea and vomiting.   Genitourinary:  Negative for decreased urine volume and hematuria.   Musculoskeletal:  Negative for back pain and neck pain.   Skin:  Negative for rash and wound.   Neurological:  Negative for seizures and numbness.   Psychiatric/Behavioral:  Negative for confusion. The patient is not nervous/anxious.    All other systems reviewed and are negative.  Objective:     Vital Signs (Most Recent):  Temp: 98 °F (36.7 °C) (03/15/22 0300)  Pulse: 69 (03/15/22 1000)  Resp: (!) 22 (03/15/22 1000)  BP: (!) 165/80 (03/15/22 1000)  SpO2: 100 % (03/15/22 1000)   Vital Signs (24h Range):  Temp:  [97.8 °F (36.6 °C)-98 °F (36.7 °C)] 98 °F (36.7 °C)  Pulse:  [60-88] 69  Resp:  [16-49] 22  SpO2:  [91 %-100 %] 100 %  BP: (134-217)/(72-97) 165/80   Weight: 64 kg (141 lb 1.5 oz)  Body mass index is 22.1 kg/m².      Intake/Output Summary (Last 24 hours) at 3/15/2022 1139  Last data filed at 3/15/2022 0600  Gross per 24 hour   Intake 652.42 ml   Output 1000 ml   Net -347.58 ml       Physical Exam  Vitals and nursing note reviewed.   Constitutional:       Appearance: Normal appearance.   HENT:      Mouth/Throat:      Mouth: Mucous membranes are moist.   Eyes:      Pupils: Pupils are equal, round, and reactive to light.   Cardiovascular:      Rate and Rhythm: Normal rate and regular rhythm.      Pulses: Normal pulses.      Heart sounds: Normal heart sounds.   Pulmonary:      Effort: Pulmonary effort is normal.      Breath sounds: Normal breath sounds.      Comments: Coarse breath sounds, no wheezing   On 3LNC  Abdominal:      General: Abdomen is flat.      Palpations: Abdomen is soft.   Skin:     General: Skin is warm and dry.   Neurological:      General: No focal deficit present.      Mental Status: He is alert and oriented to person,  place, and time.       Vents:  Oxygen Concentration (%): 30 (03/15/22 0500)  Lines/Drains/Airways       Peripheral Intravenous Line  Duration                  Peripheral IV - Single Lumen 03/13/22 1925 18 G Right Antecubital 1 day         Peripheral IV - Single Lumen 03/13/22 2041 18 G Left Hand 1 day                  Significant Labs:    CBC/Anemia Profile:  Recent Labs   Lab 03/13/22 1924 03/14/22  0552 03/15/22  0419   WBC 12.81* 7.95 10.31   HGB 12.5* 11.4* 10.8*   HCT 42.7 37.0* 34.8*    236 251   MCV 89 86 84   RDW 18.5* 18.1* 17.7*        Chemistries:  Recent Labs   Lab 03/13/22 2020 03/14/22 0552 03/15/22  0419    141 139   K 3.5 4.2 4.1    96 98   CO2 34* 38* 30*   BUN 15 16 18   CREATININE 1.0 0.8 0.7   CALCIUM 8.5* 9.0 8.9   ALBUMIN 2.8* 2.7* 2.5*   PROT 6.4 6.1 5.6*   BILITOT 0.2 0.3 0.2   ALKPHOS 86 82 67   ALT 38 30 25   AST 31 19 15   MG  --  2.1 2.0   PHOS  --  3.2 2.7       All pertinent labs within the past 24 hours have been reviewed.    Significant Imaging:  I have reviewed all pertinent imaging results/findings within the past 24 hours.      ABG  Recent Labs   Lab 03/14/22  1335   PH 7.387   PO2 52*   PCO2 61.3*   HCO3 36.9*   BE 12     Assessment/Plan:     Pulmonary  * Acute on chronic respiratory failure with hypercapnia  Patient with Hypercapnic Respiratory failure which is Acute on chronic.  he is on home oxygen at 3 LPM. Supplemental oxygen was provided and noted- Oxygen Concentration (%):  [30] 30.   Signs/symptoms of respiratory failure include- tachypnea, increased work of breathing and wheezing. Contributing diagnoses includes - COPD Labs and images were reviewed. Patient Has recent ABG, which has been reviewed. Will treat underlying causes and adjust management of respiratory failure as follows-    Baseline pCO2 in mid 70s. Arrived with PCO2 128. Does not follow with pulmonary as an outpatient.   Uses albuterol, spiriva and Bretzi Areosphere as an  outpatient    --Continue bipap at night and when napping  --duonebs  --Ceftriaxone/azithromycin x3 days  --prednisone x5 days  --needs pulmonary follow up at discharge    Chronic obstructive pulmonary disease  On 3LNC home O2. FEV1 27% but also moderate restriction on PFTs.   See respiratory failure    COPD exacerbation  See respiratory failure      Cardiac/Vascular  Chronic diastolic heart failure  TTE EF 55% with grade 1 diastolic dysfunction and mild tricuspid regurgitation  Takes lasix 20 mg as outpatient    --Continue lasix    Essential hypertension  -Continue home coreg  -Started home Lasix    Other  Tobacco abuse  -Reports smoking every other day  -Smoking cessation  -Nicotine patch if needed      Plan discussed with Dr. Fawad Diggs, ROBYN  Critical Care Medicine  [facility] - Int[facility] ([place]-[facility])

## 2022-03-15 NOTE — PLAN OF CARE
Problem: Physical Therapy Goal  Goal: Physical Therapy Goal  Outcome: Met    PT evaluation complete and no goals established. Pt is functioning at high level and does not required acute care physical therapy services. Education provided to ambulate in hallway 3x/day with supervision in order to maintain strength and endurance. Pt verbalized understanding. Please re-consult if functional mobility changes. Anticipate d/c to home; no needs.     Мария Mireles PT, DPT  3/15/2022  Pager: [phone]

## 2022-03-15 NOTE — PT/OT/SLP EVAL
"Occupational Therapy   Co-Evaluation, Co-treat and Discharge Note  Co-treatment with PT for maximal pt participation, safety, and activity tolerance     Name: Baltazar Mccray  MRN: [id number]  Admitting Diagnosis:  Acute on chronic respiratory failure with hypercapnia   Recent Surgery: * No surgery found *      Recommendations:     Discharge Recommendations: home  Discharge Equipment Recommendations:  none  Barriers to discharge:  None    Assessment:     Baltazar Mccray is a 61 y.o. male with a medical diagnosis of Acute on chronic respiratory failure with hypercapnia. At this time, patient is functioning at their prior level of function and does not require further acute OT services.     Plan:     During this hospitalization, patient does not require further acute OT services.  Please re-consult if situation changes.    · Plan of Care Reviewed with: patient    Subjective     Chief Complaint: "I find myself going to fast with things  I know I need to slow down"  Patient/Family Comments/goals: "I do all of that~"- pt in reference to all ADLs and mobility within hospital room    Occupational Profile:  Living Environment: Pt lives alone in a 1st floor apartment with no AGUSTIN. Pt has a walk in shower with tub bench and grab bars   Previous level of function: I with ADLs and mobility  Roles and Routines: Pt on disability and currently does not work. Enjoys cooking.   Equipment Used at home:  grab bar, bath bench, oxygen  Assistance upon Discharge: brother.    Pain/Comfort:  · Pain Rating 1: 0/10  · Pain Rating Post-Intervention 1: 0/10  · Pain Rating Post-Intervention 2: 0/10    Patients cultural, spiritual, Worship conflicts given the current situation: no    Objective:     Communicated with: RN prior to session.  Patient found HOB elevated with peripheral IV, telemetry, oxygen, pulse ox (continuous) upon OT entry to room.    General Precautions: Standard, fall   Orthopedic Precautions:N/A   Braces: N/A  Respiratory Status: " Nasal cannula, flow 3 L/min     Occupational Performance:    Bed Mobility:    · Patient completed Rolling/Turning to Right with supervision  · Patient completed Scooting/Bridging with supervision  · Patient completed Supine to Sit with supervision  · Patient completed Sit to Supine with supervision    Functional Mobility/Transfers:  · Patient completed Sit <> Stand Transfer with stand by assistance  with  no assistive device   · Patient completed Bed <> Chair Transfer using Step Transfer technique with stand by assistance with no assistive device  · Functional Mobility: Pt stood with SBA and mobilized in room including toilet t/f using low level toilet and no AD. Pt needed max cues for pacing as pt quickly mobilizing. Pt on 3L 02 (desaturated to 82 following mobility, increased to 93% after seated recovery)    Activities of Daily Living:  · Upper Body Dressing: stand by assistance donning gown at EOB   · Toileting: stand by assistance using standard toilet    Cognitive/Visual Perceptual:  Cognitive/Psychosocial Skills:     -       Oriented to: Person, Place, Time and Situation   -       Follows Commands/attention:Follows multistep  commands  -       Communication: clear/fluent  -       Memory: No Deficits noted  -       Safety awareness/insight to disability: intact   -       Mood/Affect/Coping skills/emotional control: Pleasant    Physical Exam:  Balance:    -       demo good sitting and standing balance   Upper Extremity Range of Motion:     -       Right Upper Extremity: WNL  -       Left Upper Extremity: WNL  Upper Extremity Strength:    -       Right Upper Extremity: WNL  -       Left Upper Extremity: WNL   Strength:    -       Right Upper Extremity: WNL  -       Left Upper Extremity: WNL    AMPAC 6 Click ADL:  AMPAC Total Score: 24    Treatment & Education:  Pt educated on role of occupational therapy, POC, and safety during ADLs and functional mobility. Pt and OT discussed importance of safe, continued  mobility to optimize daily living skills. Pt verbalized understanding. White board updated during session. Pt given instruction to call for medical staff/nurse for assistance.     Education:    Patient left up in chair with all lines intact, call button in reach and RN notified    GOALS:   Multidisciplinary Problems     Occupational Therapy Goals     Not on file                History:     Past Medical History:   Diagnosis Date    Asthma     Atrial fibrillation with rapid ventricular response     CHF (congestive heart failure)     COPD (chronic obstructive pulmonary disease)     home O2 at night only    Coronary artery disease     Hypertension     Lung nodule     Pneumonia     Seizures        Past Surgical History:   Procedure Laterality Date    ESOPHAGOGASTRODUODENOSCOPY N/A 2/11/2022    Procedure: EGD (ESOPHAGOGASTRODUODENOSCOPY);  Surgeon: Cain Ortega MD;  Location: [facility] ENDO ([address] [place] [address]);  Service: Endoscopy;  Laterality: N/A;    LEFT HEART CATHETERIZATION  4/23/2020    Procedure: Left heart cath;  Surgeon: Nic Pollack MD;  Location: [facility] CATH LAB;  Service: Cardiology;;       Time Tracking:     OT Date of Treatment: 03/15/22  OT Start Time: 1030  OT Stop Time: 1046  OT Total Time (min): 16 min    Billable Minutes:Evaluation 8 min  Self Care/Home Management 8 min    3/15/2022

## 2022-03-15 NOTE — PT/OT/SLP EVAL
"Physical Therapy Co-Evaluation, Co-Treatment and Discharge Note    Patient Name:  Baltazar Mccray   MRN:  [id number]  Admitting Diagnosis:  Acute on chronic respiratory failure with hypercapnia   Recent Surgery: * No surgery found *    Admit Date: 3/13/2022  Length of Stay: 2 days    Recommendations:     Discharge Recommendations:  home   Discharge Equipment Recommendations: none   Barriers to discharge: None    Assessment:     Baltazar Mccray is a 61 y.o. male admitted with a medical diagnosis of Acute on chronic respiratory failure with hypercapnia. Pt reports they are at baseline mobility with all mobility and ADLs. Pt denies any dizziness, SOB, or recent falls. Additionally, upon PT evaluation patient appears to be functioning at a high level with no safety concerns at this time. All questions answered within PT scope of practice. PT spent increased time providing education on importance of pacing and taking time for standing/seated rest breaks prn to prevent desaturations and SOB. Pt also edcuated on importance of taking time and pacing for safety and scanning surroundings. Pt also provided education to pt regarding importance of maintaining frequent activity and ambulating while admitted to maintain current level of mobility. Pt does not require further acute skilled therapy intervention. Please re-consult if pt experiences a change in status.    Plan:     During this hospitalization, patient does not require further acute PT services.  Please re-consult if situation changes.      Subjective     RN notified prior to session. No family/friends present upon PT entrance into room.    Chief Complaint: "I always move too quick. I don't know why I do that"  Patient/Family Comments/goals: pt hopes to go home  Pain/Comfort:  · Pain Rating 1: 0/10  · Pain Rating Post-Intervention 1: 0/10    Social History:  Residence: lives alone 1st level apartment with 0 AGUSTIN and no HR. Pt's bathroom has a walk-in shower with built in bench and " grab bars.  Support available: family  Equipment Used:  oxygen  Equipment Owned (not using): None  Prior level of function: independent  Hand Dominance: right.   Work: Disability.   Drive: no.   Managing Medicines/Managing Home: yes.   Hobbies: cooking    Patient reports they will have assistance from brother (lives nearby and checks in daily) upon discharge.    Objective:     Additional staff present: OT for co-evaluation due to patient's medical complexities requiring two skilled therapists in order to appropriately assess patient's functional deficits as well as ensure patient safety, accommodate for limited activity tolerance, and provide appropriate, skilled assistance to maximize functional potential during evaluation    Patient found HOB elevated with telemetry, blood pressure cuff, pulse ox (continuous), oxygen upon PT entry to room.    General Precautions: Standard, fall   Orthopedic Precautions:N/A   Braces: N/A   Body mass index is 22.1 kg/m².  Oxygen Device: Nasal Cannula 3L  Vitals: BP (!) 196/95   Pulse 77   Temp 98 °F (36.7 °C) (Axillary)   Resp 20   Wt 64 kg (141 lb 1.5 oz)   SpO2 97%   BMI 22.10 kg/m²     Exam:  · Cognition:   · Alert and Cooperative  · AxOx4  · Command following: Follows multistep verbal commands  · Fluency: clear/fluent  · Skin Integrity: Visible skin intact  · Edema: none  · Sensation: Intact  · Hearing: Intact  · Vision:  Intact  · Postural Assessment: slouched posture and rounded shoulders  · Coordination: grossly WFL  · Range of Motion:  · RUE: WFL  · LUE: WFL  · RLE: WFL  · LLE: WFL  · Strength Exam:  · Bilateral Upper Extremity Strength: grossly WFL  · Bilateral Lower Extremity Strength: grossly WFL    Outcome Measures:  AM-PAC 6 CLICK MOBILITY  Turning over in bed (including adjusting bedclothes, sheets and blankets)?: 4  Sitting down on and standing up from a chair with arms (e.g., wheelchair, bedside commode, etc.): 4  Moving from lying on back to sitting on the  side of the bed?: 4  Moving to and from a bed to a chair (including a wheelchair)?: 4  Need to walk in hospital room?: 4  Climbing 3-5 steps with a railing?: 3  Basic Mobility Total Score: 23     Functional Mobility:  Bed Mobility:   · Supine to Sit: supervision; from Rt side of bed  · Scooting anteriorly to EOB to have both feet planted on floor: supervision    Sitting Balance at Edge of Bed:   Static Sitting Balance: Normal : able to maintain balance against maximal resistance   Dynamic Sitting Balance: Good : able to sit unsupported and weight shift across midline moderately   Assistance Level Required: Supervision   Comments: No c/o dizziness and VSS.    Transfers:   · Sit <> Stand Transfer: supervision with no assistive device   · Stand <> Sit Transfer: supervision with no assistive device   · [id number] from EOB, [id number] from bedside chair and [id number] from toliet  · Bed <> Chair Transfer: Step Transfer technique with supervision with no assistive device    Standing Balance:   Static Standing Balance: Normal : able to maintain standing balance against maximal resistance   Dynamic Standing Balance: Good : able to stand independently unsupported and cross midline moderately   Assistance Level Required: Supervision   Patient used: no assistive device     Gait:   · Patient ambulated: 80 ft   · Patient required: SBA progressing to Supervision  · Patient used:  no assistive device   · Gait Pattern observed: reciprocal gait  · Gait Deviation(s): decreased arm swing, steady gait and decreased safety awareness  · Impairments due to: impaired endurance  · all lines remained intact throughout ambulation trial  · Comments: Pt was able to perform vertical/horizontal head turns, 180 degree turns while ambulating, and avoid in-room obstacles without LOB. Pt with quick lara and needed max cues for pacing to prevent further SOB/REES. Pt with poor line mgmt and needed max cueing for paying attention to supplemental O2  tomeka. Once pt took his time and slowed down pt progressed to supervision assist for mobility with improved awareness. SPO2 did drop to 82% but quickly increased to 93%.    Stairs:   N/a no staris    Education:   Time provided for education, counseling and discussion of health disposition in regards to patient's current status   All questions answered within PT scope of practice and to patient's satisfaction   PT role in POC to address current functional deficits   Pt educated on proper body mechanics, safety techniques, and energy conservation with PT facilitation and cueing throughout session   Whiteboard updated with therapist name and pt's current mobility status documented above   Safe to perform step transfer to/from chair/bedside commode supervision and no AD w/ nursing/PCT present   Pt to ambulate 2-3x/day supervision and no AD with nsg/PCT in order to maintain functional mobility   Importance of OOB tolerance 8-10 hrs/day to improve lung ventilation and expansion as well as strengthen postural musculature    Patient left sitting edge of bed with all lines intact, call button in reach and RN notified.    GOALS:   Multidisciplinary Problems     Physical Therapy Goals     Not on file          Multidisciplinary Problems (Resolved)        Problem: Physical Therapy Goal    Goal Priority Disciplines Outcome Goal Variances Interventions   Physical Therapy Goal   (Resolved)     PT, PT/OT Met                     History:     Past Medical History:   Diagnosis Date    Asthma     Atrial fibrillation with rapid ventricular response     CHF (congestive heart failure)     COPD (chronic obstructive pulmonary disease)     home O2 at night only    Coronary artery disease     Hypertension     Lung nodule     Pneumonia     Seizures        Past Surgical History:   Procedure Laterality Date    ESOPHAGOGASTRODUODENOSCOPY N/A 2/11/2022    Procedure: EGD (ESOPHAGOGASTRODUODENOSCOPY);  Surgeon: Cain Ortega MD;   Location: [facility] ENDO (2ND FLR);  Service: Endoscopy;  Laterality: N/A;    LEFT HEART CATHETERIZATION  4/23/2020    Procedure: Left heart cath;  Surgeon: Nic Pollack MD;  Location: [facility] CATH LAB;  Service: Cardiology;;       Time Tracking:     PT Received On: 03/15/22  PT Start Time: 1030     PT Stop Time: 1046  PT Total Time (min): 16 min     Billable Minutes: Evaluation 1 procedure and Therapeutic Activity 8 minutes    Мария Mireles, PT, DPT  3/15/2022  Pager: [phone]

## 2022-03-16 LAB
ALBUMIN SERPL BCP-MCNC: 2.8 G/DL (ref 3.5–5.2)
ALP SERPL-CCNC: 73 U/L (ref 55–135)
ALT SERPL W/O P-5'-P-CCNC: 29 U/L (ref 10–44)
ANION GAP SERPL CALC-SCNC: 12 MMOL/L (ref 8–16)
AST SERPL-CCNC: 16 U/L (ref 10–40)
BASOPHILS # BLD AUTO: 0.02 K/UL (ref 0–0.2)
BASOPHILS NFR BLD: 0.2 % (ref 0–1.9)
BILIRUB SERPL-MCNC: 0.2 MG/DL (ref 0.1–1)
BUN SERPL-MCNC: 17 MG/DL (ref 6–20)
CALCIUM SERPL-MCNC: 9.6 MG/DL (ref 8.7–10.5)
CHLORIDE SERPL-SCNC: 97 MMOL/L (ref 95–110)
CO2 SERPL-SCNC: 32 MMOL/L (ref 23–29)
CREAT SERPL-MCNC: 0.9 MG/DL (ref 0.5–1.4)
DIFFERENTIAL METHOD: ABNORMAL
EOSINOPHIL # BLD AUTO: 0 K/UL (ref 0–0.5)
EOSINOPHIL NFR BLD: 0.1 % (ref 0–8)
ERYTHROCYTE [DISTWIDTH] IN BLOOD BY AUTOMATED COUNT: 18 % (ref 11.5–14.5)
EST. GFR  (AFRICAN AMERICAN): >60 ML/MIN/1.73 M^2
EST. GFR  (NON AFRICAN AMERICAN): >60 ML/MIN/1.73 M^2
GLUCOSE SERPL-MCNC: 63 MG/DL (ref 70–110)
HCT VFR BLD AUTO: 40.3 % (ref 40–54)
HGB BLD-MCNC: 12.2 G/DL (ref 14–18)
IMM GRANULOCYTES # BLD AUTO: 0.08 K/UL (ref 0–0.04)
IMM GRANULOCYTES NFR BLD AUTO: 0.9 % (ref 0–0.5)
LYMPHOCYTES # BLD AUTO: 1.6 K/UL (ref 1–4.8)
LYMPHOCYTES NFR BLD: 17.1 % (ref 18–48)
MAGNESIUM SERPL-MCNC: 2 MG/DL (ref 1.6–2.6)
MCH RBC QN AUTO: 25.7 PG (ref 27–31)
MCHC RBC AUTO-ENTMCNC: 30.3 G/DL (ref 32–36)
MCV RBC AUTO: 85 FL (ref 82–98)
MONOCYTES # BLD AUTO: 0.9 K/UL (ref 0.3–1)
MONOCYTES NFR BLD: 10.3 % (ref 4–15)
NEUTROPHILS # BLD AUTO: 6.5 K/UL (ref 1.8–7.7)
NEUTROPHILS NFR BLD: 71.4 % (ref 38–73)
NRBC BLD-RTO: 0 /100 WBC
PHOSPHATE SERPL-MCNC: 3.5 MG/DL (ref 2.7–4.5)
PLATELET # BLD AUTO: 260 K/UL (ref 150–450)
PMV BLD AUTO: 11 FL (ref 9.2–12.9)
POTASSIUM SERPL-SCNC: 4.1 MMOL/L (ref 3.5–5.1)
PROT SERPL-MCNC: 6.3 G/DL (ref 6–8.4)
RBC # BLD AUTO: 4.74 M/UL (ref 4.6–6.2)
SODIUM SERPL-SCNC: 141 MMOL/L (ref 136–145)
WBC # BLD AUTO: 9.13 K/UL (ref 3.9–12.7)

## 2022-03-16 PROCEDURE — 25000003 PHARM REV CODE 250: Performed by: STUDENT IN AN ORGANIZED HEALTH CARE EDUCATION/TRAINING PROGRAM

## 2022-03-16 PROCEDURE — 25000003 PHARM REV CODE 250: Performed by: NURSE PRACTITIONER

## 2022-03-16 PROCEDURE — 99900035 HC TECH TIME PER 15 MIN (STAT)

## 2022-03-16 PROCEDURE — 94640 AIRWAY INHALATION TREATMENT: CPT

## 2022-03-16 PROCEDURE — 25000242 PHARM REV CODE 250 ALT 637 W/ HCPCS: Performed by: PHYSICIAN ASSISTANT

## 2022-03-16 PROCEDURE — 25000003 PHARM REV CODE 250: Performed by: PHYSICIAN ASSISTANT

## 2022-03-16 PROCEDURE — 93010 ELECTROCARDIOGRAM REPORT: CPT | Mod: ,,, | Performed by: INTERNAL MEDICINE

## 2022-03-16 PROCEDURE — 93010 EKG 12-LEAD: ICD-10-PCS | Mod: ,,, | Performed by: INTERNAL MEDICINE

## 2022-03-16 PROCEDURE — 99233 SBSQ HOSP IP/OBS HIGH 50: CPT | Mod: ,,, | Performed by: STUDENT IN AN ORGANIZED HEALTH CARE EDUCATION/TRAINING PROGRAM

## 2022-03-16 PROCEDURE — 85025 COMPLETE CBC W/AUTO DIFF WBC: CPT | Performed by: PHYSICIAN ASSISTANT

## 2022-03-16 PROCEDURE — 80053 COMPREHEN METABOLIC PANEL: CPT | Performed by: PHYSICIAN ASSISTANT

## 2022-03-16 PROCEDURE — 99233 PR SUBSEQUENT HOSPITAL CARE,LEVL III: ICD-10-PCS | Mod: ,,, | Performed by: STUDENT IN AN ORGANIZED HEALTH CARE EDUCATION/TRAINING PROGRAM

## 2022-03-16 PROCEDURE — 63600175 PHARM REV CODE 636 W HCPCS: Performed by: PHYSICIAN ASSISTANT

## 2022-03-16 PROCEDURE — 83735 ASSAY OF MAGNESIUM: CPT | Performed by: PHYSICIAN ASSISTANT

## 2022-03-16 PROCEDURE — 94761 N-INVAS EAR/PLS OXIMETRY MLT: CPT

## 2022-03-16 PROCEDURE — 11000001 HC ACUTE MED/SURG PRIVATE ROOM

## 2022-03-16 PROCEDURE — 93005 ELECTROCARDIOGRAM TRACING: CPT

## 2022-03-16 PROCEDURE — 27000221 HC OXYGEN, UP TO 24 HOURS

## 2022-03-16 PROCEDURE — 84100 ASSAY OF PHOSPHORUS: CPT | Performed by: PHYSICIAN ASSISTANT

## 2022-03-16 PROCEDURE — 94660 CPAP INITIATION&MGMT: CPT

## 2022-03-16 PROCEDURE — 36415 COLL VENOUS BLD VENIPUNCTURE: CPT | Performed by: PHYSICIAN ASSISTANT

## 2022-03-16 PROCEDURE — 25000003 PHARM REV CODE 250: Performed by: INTERNAL MEDICINE

## 2022-03-16 RX ORDER — PREDNISONE 20 MG/1
40 TABLET ORAL DAILY
Qty: 4 TABLET | Refills: 0 | Status: SHIPPED | OUTPATIENT
Start: 2022-03-17 | End: 2022-03-21 | Stop reason: HOSPADM

## 2022-03-16 RX ORDER — ALBUTEROL SULFATE 90 UG/1
1-2 AEROSOL, METERED RESPIRATORY (INHALATION) EVERY 6 HOURS PRN
Qty: 8.5 G | Refills: 0 | Status: SHIPPED | OUTPATIENT
Start: 2022-03-16 | End: 2022-04-11 | Stop reason: SDUPTHER

## 2022-03-16 RX ORDER — LEVOFLOXACIN 750 MG/1
750 TABLET ORAL DAILY
Qty: 5 TABLET | Refills: 0 | Status: SHIPPED | OUTPATIENT
Start: 2022-03-16 | End: 2022-03-18 | Stop reason: SDUPTHER

## 2022-03-16 RX ORDER — BUDESONIDE, GLYCOPYRROLATE, AND FORMOTEROL FUMARATE 160; 9; 4.8 UG/1; UG/1; UG/1
2 AEROSOL, METERED RESPIRATORY (INHALATION) 2 TIMES DAILY
Qty: 10.7 G | Refills: 11 | Status: SHIPPED | OUTPATIENT
Start: 2022-03-16 | End: 2022-04-11 | Stop reason: SDUPTHER

## 2022-03-16 RX ORDER — TIOTROPIUM BROMIDE INHALATION SPRAY 3.12 UG/1
5 SPRAY, METERED RESPIRATORY (INHALATION) DAILY
Qty: 4 G | Refills: 3 | Status: SHIPPED | OUTPATIENT
Start: 2022-03-16 | End: 2022-03-18 | Stop reason: HOSPADM

## 2022-03-16 RX ADMIN — SIMETHICONE 80 MG: 80 TABLET, CHEWABLE ORAL at 03:03

## 2022-03-16 RX ADMIN — IPRATROPIUM BROMIDE AND ALBUTEROL SULFATE 3 ML: 2.5; .5 SOLUTION RESPIRATORY (INHALATION) at 01:03

## 2022-03-16 RX ADMIN — PREDNISONE 40 MG: 20 TABLET ORAL at 09:03

## 2022-03-16 RX ADMIN — IPRATROPIUM BROMIDE AND ALBUTEROL SULFATE 3 ML: 2.5; .5 SOLUTION RESPIRATORY (INHALATION) at 07:03

## 2022-03-16 RX ADMIN — FUROSEMIDE 20 MG: 20 TABLET ORAL at 09:03

## 2022-03-16 RX ADMIN — MUPIROCIN: 20 OINTMENT TOPICAL at 09:03

## 2022-03-16 RX ADMIN — CARVEDILOL 12.5 MG: 12.5 TABLET, FILM COATED ORAL at 08:03

## 2022-03-16 RX ADMIN — PANTOPRAZOLE SODIUM 40 MG: 40 TABLET, DELAYED RELEASE ORAL at 08:03

## 2022-03-16 RX ADMIN — SIMETHICONE 80 MG: 80 TABLET, CHEWABLE ORAL at 01:03

## 2022-03-16 RX ADMIN — LEVOFLOXACIN 750 MG: 500 TABLET, FILM COATED ORAL at 10:03

## 2022-03-16 RX ADMIN — PANTOPRAZOLE SODIUM 40 MG: 40 TABLET, DELAYED RELEASE ORAL at 09:03

## 2022-03-16 NOTE — PLAN OF CARE
Problem: Adult Inpatient Plan of Care  Goal: Plan of Care Review  Outcome: Ongoing, Progressing  Goal: Patient-Specific Goal (Individualized)  Outcome: Ongoing, Progressing  Goal: Absence of Hospital-Acquired Illness or Injury  Outcome: Ongoing, Progressing  Goal: Optimal Comfort and Wellbeing  Outcome: Ongoing, Progressing  Goal: Readiness for Transition of Care  Outcome: Ongoing, Progressing   Alert and oreint times 4. No complaint voiced this shift. Slept most of shift. NO falls. VSS

## 2022-03-16 NOTE — PROGRESS NOTES
[facility] Hospital Medicine  Progress Note    Patient Name: Baltazar Mccray  MRN: [id number]  Patient Class: IP- Inpatient   Admission Date: 3/13/2022  Length of Stay: 3 days  Attending Physician: Marilee Jackson MD  Primary Care Provider: Jaden        Subjective:     Principal Problem:Acute on chronic respiratory failure with hypercapnia    HPI:  Mei Mccray 61yoM w/PMHx of CHF, COPD, HTN, AFib who presented to [facility] w/ shortness of breath 03/13.  Per EMS, patient had been feeling short of breath all day.  He was found to be hypoxic with increased work of breathing. En route to ED, he received 1 DuoNeb, Solu-Medrol 125mg, magnesium 2 g and 2.5 of Versed for acute agitation.  He was placed on CPAP.     Upon arrival to ED,  patient was tachypneic to 30s and hypertensive 150s/60s. He was transitioned from CPAP to BIPAP. Labs notable for WBC 12.8, Hg 12.5, Bicarb 34, , VBG 7.209/PCO2 104/PO2 27/PHCO3 41/BE 14+- while on Bipap. CXR w/interstitial coarsening- interstitial pneumonia vs.pulm edema. Admitted to MICU due to need for continuous bipap.     Overview/Hospital Course:  ICU Course:  Patient was started on azithro/rocephin for suspected CAP/ treatment for COPD exacerbation. Continued on prednisone 40mg qday. Breathing treatments set at every 6 hours. Patient was weaned to bipap at nighttime and w/naps. On 3L BNC. Most recent VBG w/pH 7.37/pCO2 30/pO2 30/pHCO3 43/BE 18. Deemed stable for transfer to hospital medicine 03/14.    Hospital Medicine Course:  Patient officially transferred to hospital medicine service 03/16. He was doing well- on home oxygen settings. Repeat ECG with normal Qtc. Patient was transitioned to levofloxacin to complete 5d course. Sent script to complete 5d of prednisone total. Outpatient follow-up scheduled w/PCP and pulmonology. Patient was stable for discharge to home 03/16, but had delays in arranging home bipap. Awaiting approval from insurance  company.     Interval History: Patient was seen and examined this am. Improvement in shortness of breath. States that he is ready to go home. No nausea, vomiting, fevers, chills, night sweats, abd pain, diarrhea, constipation.     Objective:     Vital Signs (Most Recent):  Temp: 98.1 °F (36.7 °C) (03/16/22 1612)  Pulse: 69 (03/16/22 1612)  Resp: 18 (03/16/22 1612)  BP: 137/75 (03/16/22 1612)  SpO2: (!) 94 % (03/16/22 1612)   Vital Signs (24h Range):  Temp:  [96.1 °F (35.6 °C)-98.1 °F (36.7 °C)] 98.1 °F (36.7 °C)  Pulse:  [57-86] 69  Resp:  [18-23] 18  SpO2:  [94 %-99 %] 94 %  BP: (136-177)/(75-96) 137/75     Weight: 68.4 kg (150 lb 12.7 oz)  Body mass index is 23.62 kg/m².    Intake/Output Summary (Last 24 hours) at 3/16/2022 1739  Last data filed at 3/16/2022 0500  Gross per 24 hour   Intake 420 ml   Output 700 ml   Net -280 ml      Physical Exam  Gen: in NAD, appears stated age  Neuro: AAOx3, motor, sensory, and strength grossly intact BL  HEENT: NTNC, EOMI, PERRL, MMM  CVS: RRR, no m/r/g; S1/S2 auscultated with no S3 or S4; capillary refill < 2 sec  Resp: 3L BNC, faint bibasilar rales, no wheezes, no belabored breathing or accessory muscle use appreciated   Abd: BS+ in all 4 quadrants; NTND, soft to palpation; no organomegaly appreciated   Extrem: pulses full, equal, and regular over all 4 extremities; no UE or LE edema BL    Significant Labs: All pertinent labs within the past 24 hours have been reviewed.  CBC:   Recent Labs   Lab 03/15/22  0419 03/16/22  0455   WBC 10.31 9.13   HGB 10.8* 12.2*   HCT 34.8* 40.3    260     CMP:   Recent Labs   Lab 03/15/22  0419 03/16/22  0455    141   K 4.1 4.1   CL 98 97   CO2 30* 32*   GLU 99 63*   BUN 18 17   CREATININE 0.7 0.9   CALCIUM 8.9 9.6   PROT 5.6* 6.3   ALBUMIN 2.5* 2.8*   BILITOT 0.2 0.2   ALKPHOS 67 73   AST 15 16   ALT 25 29   ANIONGAP 11 12   EGFRNONAA >60.0 >60.0       Significant Imaging: I have reviewed all pertinent imaging results/findings  within the past 24 hours.      Assessment/Plan:     * Acute on chronic respiratory failure with hypercapnia  - likely 2/2 copd exacerbation at this time   - home oxygen at 2 LPM  - continue bipap at bedtime- currently awaiting approval for home bipap  - improved      Chronic obstructive pulmonary disease  - Wean O2 as tolerated to keep O2 saturations >88%  -  Repeat ECG w/no prolonged QTc- stop rocephin and azithro and transition to levaquin   - Continue prednisone  - Scheduled duonebs q 6 hrs     Essential hypertension  - Continue home Coreg    Tobacco abuse  - Cessation counseling  - Nicotine patch if needed    VTE Risk Mitigation (From admission, onward)         Ordered     enoxaparin injection 40 mg  Daily         03/13/22 2246     IP VTE HIGH RISK PATIENT  Once         03/13/22 2246     Place sequential compression device  Until discontinued         03/13/22 2246                Discharge Planning   RUTH: 3/16/2022     Code Status: Full Code   Is the patient medically ready for discharge?: Yes    Reason for patient still in hospital (select all that apply): Pending disposition  Discharge Plan A: Home              Marilee Jackson MD  Department of Hospital Medicine   [facility]

## 2022-03-16 NOTE — SUBJECTIVE & OBJECTIVE
Interval History: Patient was seen and examined this am. Improvement in shortness of breath. States that he is ready to go home. No nausea, vomiting, fevers, chills, night sweats, abd pain, diarrhea, constipation.     Objective:     Vital Signs (Most Recent):  Temp: 98.1 °F (36.7 °C) (03/16/22 1612)  Pulse: 69 (03/16/22 1612)  Resp: 18 (03/16/22 1612)  BP: 137/75 (03/16/22 1612)  SpO2: (!) 94 % (03/16/22 1612)   Vital Signs (24h Range):  Temp:  [96.1 °F (35.6 °C)-98.1 °F (36.7 °C)] 98.1 °F (36.7 °C)  Pulse:  [57-86] 69  Resp:  [18-23] 18  SpO2:  [94 %-99 %] 94 %  BP: (136-177)/(75-96) 137/75     Weight: 68.4 kg (150 lb 12.7 oz)  Body mass index is 23.62 kg/m².    Intake/Output Summary (Last 24 hours) at 3/16/2022 1739  Last data filed at 3/16/2022 0500  Gross per 24 hour   Intake 420 ml   Output 700 ml   Net -280 ml      Physical Exam  Gen: in NAD, appears stated age  Neuro: AAOx3, motor, sensory, and strength grossly intact BL  HEENT: NTNC, EOMI, PERRL, MMM  CVS: RRR, no m/r/g; S1/S2 auscultated with no S3 or S4; capillary refill < 2 sec  Resp: 3L BNC, faint bibasilar rales, no wheezes, no belabored breathing or accessory muscle use appreciated   Abd: BS+ in all 4 quadrants; NTND, soft to palpation; no organomegaly appreciated   Extrem: pulses full, equal, and regular over all 4 extremities; no UE or LE edema BL    Significant Labs: All pertinent labs within the past 24 hours have been reviewed.  CBC:   Recent Labs   Lab 03/15/22  2251 03/16/22  1017   WBC 10.31 9.13   HGB 10.8* 12.2*   HCT 34.8* 40.3    260     CMP:   Recent Labs   Lab 03/15/22  0419 03/16/22  0455    141   K 4.1 4.1   CL 98 97   CO2 30* 32*   GLU 99 63*   BUN 18 17   CREATININE 0.7 0.9   CALCIUM 8.9 9.6   PROT 5.6* 6.3   ALBUMIN 2.5* 2.8*   BILITOT 0.2 0.2   ALKPHOS 67 73   AST 15 16   ALT 25 29   ANIONGAP 11 12   EGFRNONAA >60.0 >60.0       Significant Imaging: I have reviewed all pertinent imaging results/findings within the past  24 hours.

## 2022-03-16 NOTE — DISCHARGE SUMMARY
[facility] Medicine  Discharge Summary    Patient Name: Baltazar Mccray  MRN: [id number]  Patient Class: IP- Inpatient  Admission Date: 3/13/2022  Hospital Length of Stay: 3 days  Discharge Date and Time:  03/16/2022 12:57 PM  Attending Physician: Marilee Jackson MD   Discharging Provider: Marilee Jackson MD  Primary Care Provider: [facility] Medicine Team: [facility] HOSP MED D Marilee Jackson MD    HPI:   Mei Mccray 61yoM w/PMHx of CHF, COPD, HTN, AFib who presented to [facility] w/ shortness of breath 03/13.  Per EMS, patient had been feeling short of breath all day.  He was found to be hypoxic with increased work of breathing. En route to ED, he received 1 DuoNeb, Solu-Medrol 125mg, magnesium 2 g and 2.5 of Versed for acute agitation.  He was placed on CPAP.     Upon arrival to ED,  patient was tachypneic to 30s and hypertensive 150s/60s. He was transitioned from CPAP to BIPAP. Labs notable for WBC 12.8, Hg 12.5, Bicarb 34, , VBG 7.209/PCO2 104/PO2 27/PHCO3 41/BE 14+- while on Bipap. CXR w/interstitial coarsening- interstitial pneumonia vs.pulm edema. Admitted to MICU due to need for continuous bipap.         Hospital Course:   ICU Course:  Patient was started on azithro/rocephin for suspected CAP/ treatment for COPD exacerbation. Continued on prednisone 40mg qday. Breathing treatments set at every 6 hours. Patient was weaned to bipap at nighttime and w/naps. On 3L BNC. Most recent VBG w/pH 7.37/pCO2 30/pO2 30/pHCO3 43/BE 18. Deemed stable for transfer to hospital medicine 03/14.    Hospital Medicine Course:  Patient officially transferred to hospital medicine service 03/16. He was doing well- on home oxygen settings. Bipap arranged for home use. Outpatient follow-up scheduled w/PCP and pulmonology.     Pt deemed appropriate for discharge. Plan discussed with pt, who was agreeable and amenable; medications were discussed and reviewed, outpatient follow-up scheduled,  ER precautions were given, all questions were answered to the pt's satisfaction, and Mr. Mccray was subsequently discharged.    Physical Exam  Gen: in NAD, appears stated age  Neuro: AAOx3, motor, sensory, and strength grossly intact BL  HEENT: NTNC, EOMI, PERRL, MMM  CVS: RRR, no m/r/g; S1/S2 auscultated with no S3 or S4; capillary refill < 2 sec  Resp: 3L BNC, faint bibasilar rales, no wheezes, no belabored breathing or accessory muscle use appreciated   Abd: BS+ in all 4 quadrants; NTND, soft to palpation; no organomegaly appreciated   Extrem: pulses full, equal, and regular over all 4 extremities; no UE or LE edema BL         Goals of Care Treatment Preferences:  Code Status: Full Code    Consults:   Consults (From admission, onward)        Status Ordering Provider     Inpatient consult to Social Work  Once        Provider:  (Not yet assigned)    Acknowledged ALEKSANDAR ORTEGA     Inpatient consult to Critical Care Medicine  Once        Provider:  (Not yet assigned)    Completed BRITNI ROGERS        Final Active Diagnoses:    Diagnosis Date Noted POA    PRINCIPAL PROBLEM:  Acute on chronic respiratory failure with hypercapnia [J96.22] 03/06/2022 Yes    Chronic diastolic heart failure [I50.32] 05/21/2021 Yes    Chronic obstructive pulmonary disease [J44.9] 04/27/2021 Yes     Chronic    Tobacco abuse [Z72.0] 08/12/2018 Yes     Chronic    Essential hypertension [I10] 05/19/2017 Yes     Chronic    COPD exacerbation [J44.1] 05/14/2017 Yes      Problems Resolved During this Admission:       Discharged Condition: good    Disposition: Home or Self Care    Follow Up:   Follow-up Information     [facility] Follow up in 1 week(s).    Why: hospital discharge follow-up  Contact information:  [address]  [facility] [address]  [phone]                       Patient Instructions:      BIPAP FOR HOME USE   Order Comments: Needs 30% FiO2- oxygen requirements for bipap     Order  Specific Question Answer Comments   Length of need (1-99 months): 99    Type:  BiPap    BiPap setting (cmH20) Inspiratory: 12    BiPap setting (cmH20) Expiratory: 5    Humidification (): Heated    Choose ONE mask type and its corresponding cushions and/or pillows:  Nasal Pillow Mask, 1 per 90 days:  Nasal Pillows, (6 per 90 days)    Choose EITHER Heated or Non-Heated Tubjing  Non-Heated Tubing, 1 per 90 days    All other supplies as needed as listed below:  Headgear, 1 per 180 days    All other supplies as needed as listed below:  Disposable Filter, 6 per 90 days    All other supplies as needed as listed below:  Exhalation Port, contact payer for quantity/frequency    All other supplies as needed as listed below:  Humidifier Chamber, 1 per 180 days    All other supplies as needed as listed below:  Chin Strap, 1 per 180 days      Ambulatory referral/consult to Pulmonology   Standing Status: Future   Referral Priority: Routine Referral Type: Consultation   Referral Reason: Specialty Services Required   Requested Specialty: Pulmonary Disease   Number of Visits Requested: 1     Diet Cardiac     Notify your health care provider if you experience any of the following:  difficulty breathing or increased cough     Activity as tolerated       Significant Diagnostic Studies: Labs:   CMP   Recent Labs   Lab 03/15/22  0419 03/16/22  0455    141   K 4.1 4.1   CL 98 97   CO2 30* 32*   GLU 99 63*   BUN 18 17   CREATININE 0.7 0.9   CALCIUM 8.9 9.6   PROT 5.6* 6.3   ALBUMIN 2.5* 2.8*   BILITOT 0.2 0.2   ALKPHOS 67 73   AST 15 16   ALT 25 29   ANIONGAP 11 12   ESTGFRAFRICA >60.0 >60.0   EGFRNONAA >60.0 >60.0   , CBC   Recent Labs   Lab 03/15/22  0419 03/16/22  0455   WBC 10.31 9.13   HGB 10.8* 12.2*   HCT 34.8* 40.3    260    and All labs within the past 24 hours have been reviewed    CXR:  FINDINGS:  Monitoring leads and tubing overlie the chest.     Cardiomediastinal  silhouette is midline and stable.  Pulmonary vasculature and hilar contours are unchanged.  Few scattered linear opacities throughout the lungs consistent with minimal platelike scarring versus atelectasis.  Bilateral diffuse nonspecific interstitial coarsening with a perihilar and upper lobe predominance slightly more so on the left, with central peribronchial cuffing and overall increased from 03/05/2022 study.  The lungs are otherwise symmetrically hyperexpanded without large consolidation, pleural effusion or pneumothorax.  No acute osseous process seen.     Impression:     Left greater than right perihilar and upper lung zone predominant interval increased nonspecific interstitial coarsening which can be seen with worsening interstitial type pneumonia from inflammatory or infectious process including atypical bacterial or viral etiology versus more atypical distribution of pulmonary edema.  No large consolidation.    Medications:  Reconciled Home Medications:      Medication List      START taking these medications    levoFLOXacin 750 MG tablet  Commonly known as: LEVAQUIN  Take 1 tablet (750 mg total) by mouth once daily for 5 days     predniSONE 20 MG tablet  Commonly known as: DELTASONE  Take 2 tablets (40 mg total) by mouth once daily for 2 days  Start taking on: March 17, 2022        CHANGE how you take these medications    BREZI AEROSPHERE 160-9-4.8 mcg/actuation [facility]  Generic drug: budesonide-glycopyr-formoterol  Inhale 2 puffs into the lungs 2 (two) times daily. Rinse mouth after use.  What changed: Another medication with the same name was removed. Continue taking this medication, and follow the directions you see here.     SPIRIVA WITH HANDIHALER 18 mcg inhalation capsule  Generic drug: tiotropium  Inhale 1 capsule (18 mcg total) into the lungs once daily.  What changed: Another medication with the same name was removed. Continue taking this medication, and follow the directions you see here.         CONTINUE taking these medications    acetaminophen 325 MG tablet  Commonly known as: TYLENOL  Take 2 tablets (650 mg total) by mouth every 8 (eight) hours as needed for Pain or Temperature greater than (100.5).     albuterol 90 mcg/actuation inhaler  Commonly known as: PROVENTIL/VENTOLIN HFA  Inhale 1-2 puffs into the lungs every 6 (six) hours as needed for Wheezing. Rescue     albuterol-ipratropium 2.5 mg-0.5 mg/3 mL nebulizer solution  Commonly known as: DUO-NEB  Take 3 mLs by nebulization every 4 (four) hours as needed for Wheezing or Shortness of Breath. Rescue     carvediloL 12.5 MG tablet  Commonly known as: COREG  Take 1 tablet (12.5 mg total) by mouth 2 (two) times daily with meals.     furosemide 20 MG tablet  Commonly known as: LASIX  Take 1 tablet (20 mg total) by mouth once daily.     pantoprazole 40 MG tablet  Commonly known as: PROTONIX  Take 1 tablet (40 mg total) by mouth 2 (two) times daily.            Indwelling Lines/Drains at time of discharge:   Lines/Drains/Airways     None                 Time spent on the discharge of patient: 35 minutes           Marilee Jackson MD  Department of Hospital Medicine  [facility] Surg

## 2022-03-17 ENCOUNTER — DOCUMENTATION ONLY (OUTPATIENT)
Dept: PHARMACY | Facility: CLINIC | Age: 62
End: 2022-03-17
Payer: MEDICAID

## 2022-03-17 LAB
ALBUMIN SERPL BCP-MCNC: 2.6 G/DL (ref 3.5–5.2)
ALP SERPL-CCNC: 68 U/L (ref 55–135)
ALT SERPL W/O P-5'-P-CCNC: 29 U/L (ref 10–44)
ANION GAP SERPL CALC-SCNC: 9 MMOL/L (ref 8–16)
AST SERPL-CCNC: 15 U/L (ref 10–40)
BASOPHILS # BLD AUTO: 0.01 K/UL (ref 0–0.2)
BASOPHILS NFR BLD: 0.1 % (ref 0–1.9)
BILIRUB SERPL-MCNC: 0.2 MG/DL (ref 0.1–1)
BUN SERPL-MCNC: 16 MG/DL (ref 6–20)
CALCIUM SERPL-MCNC: 9 MG/DL (ref 8.7–10.5)
CHLORIDE SERPL-SCNC: 97 MMOL/L (ref 95–110)
CO2 SERPL-SCNC: 33 MMOL/L (ref 23–29)
CREAT SERPL-MCNC: 0.8 MG/DL (ref 0.5–1.4)
DIFFERENTIAL METHOD: ABNORMAL
EOSINOPHIL # BLD AUTO: 0 K/UL (ref 0–0.5)
EOSINOPHIL NFR BLD: 0.1 % (ref 0–8)
ERYTHROCYTE [DISTWIDTH] IN BLOOD BY AUTOMATED COUNT: 18.1 % (ref 11.5–14.5)
EST. GFR  (AFRICAN AMERICAN): >60 ML/MIN/1.73 M^2
EST. GFR  (NON AFRICAN AMERICAN): >60 ML/MIN/1.73 M^2
GLUCOSE SERPL-MCNC: 99 MG/DL (ref 70–110)
HCT VFR BLD AUTO: 39.9 % (ref 40–54)
HGB BLD-MCNC: 12.1 G/DL (ref 14–18)
IMM GRANULOCYTES # BLD AUTO: 0.06 K/UL (ref 0–0.04)
IMM GRANULOCYTES NFR BLD AUTO: 0.7 % (ref 0–0.5)
LYMPHOCYTES # BLD AUTO: 1.5 K/UL (ref 1–4.8)
LYMPHOCYTES NFR BLD: 16.7 % (ref 18–48)
MAGNESIUM SERPL-MCNC: 1.9 MG/DL (ref 1.6–2.6)
MCH RBC QN AUTO: 26 PG (ref 27–31)
MCHC RBC AUTO-ENTMCNC: 30.3 G/DL (ref 32–36)
MCV RBC AUTO: 86 FL (ref 82–98)
MONOCYTES # BLD AUTO: 0.7 K/UL (ref 0.3–1)
MONOCYTES NFR BLD: 7.4 % (ref 4–15)
NEUTROPHILS # BLD AUTO: 6.9 K/UL (ref 1.8–7.7)
NEUTROPHILS NFR BLD: 75 % (ref 38–73)
NRBC BLD-RTO: 0 /100 WBC
PHOSPHATE SERPL-MCNC: 3.3 MG/DL (ref 2.7–4.5)
PLATELET # BLD AUTO: 254 K/UL (ref 150–450)
PMV BLD AUTO: 10.5 FL (ref 9.2–12.9)
POTASSIUM SERPL-SCNC: 4.1 MMOL/L (ref 3.5–5.1)
PROT SERPL-MCNC: 5.8 G/DL (ref 6–8.4)
RBC # BLD AUTO: 4.66 M/UL (ref 4.6–6.2)
SODIUM SERPL-SCNC: 139 MMOL/L (ref 136–145)
WBC # BLD AUTO: 9.15 K/UL (ref 3.9–12.7)

## 2022-03-17 PROCEDURE — 99900035 HC TECH TIME PER 15 MIN (STAT)

## 2022-03-17 PROCEDURE — 36415 COLL VENOUS BLD VENIPUNCTURE: CPT | Performed by: PHYSICIAN ASSISTANT

## 2022-03-17 PROCEDURE — 27000221 HC OXYGEN, UP TO 24 HOURS

## 2022-03-17 PROCEDURE — 11000001 HC ACUTE MED/SURG PRIVATE ROOM

## 2022-03-17 PROCEDURE — 99233 PR SUBSEQUENT HOSPITAL CARE,LEVL III: ICD-10-PCS | Mod: 95,,, | Performed by: INTERNAL MEDICINE

## 2022-03-17 PROCEDURE — 84100 ASSAY OF PHOSPHORUS: CPT | Performed by: PHYSICIAN ASSISTANT

## 2022-03-17 PROCEDURE — 83735 ASSAY OF MAGNESIUM: CPT | Performed by: PHYSICIAN ASSISTANT

## 2022-03-17 PROCEDURE — 99233 SBSQ HOSP IP/OBS HIGH 50: CPT | Mod: 95,,, | Performed by: INTERNAL MEDICINE

## 2022-03-17 PROCEDURE — 25000242 PHARM REV CODE 250 ALT 637 W/ HCPCS: Performed by: PHYSICIAN ASSISTANT

## 2022-03-17 PROCEDURE — 25000003 PHARM REV CODE 250: Performed by: STUDENT IN AN ORGANIZED HEALTH CARE EDUCATION/TRAINING PROGRAM

## 2022-03-17 PROCEDURE — 25000003 PHARM REV CODE 250: Performed by: NURSE PRACTITIONER

## 2022-03-17 PROCEDURE — 25000003 PHARM REV CODE 250: Performed by: INTERNAL MEDICINE

## 2022-03-17 PROCEDURE — 63600175 PHARM REV CODE 636 W HCPCS: Performed by: PHYSICIAN ASSISTANT

## 2022-03-17 PROCEDURE — 94761 N-INVAS EAR/PLS OXIMETRY MLT: CPT

## 2022-03-17 PROCEDURE — 25000003 PHARM REV CODE 250: Performed by: PHYSICIAN ASSISTANT

## 2022-03-17 PROCEDURE — 80053 COMPREHEN METABOLIC PANEL: CPT | Performed by: PHYSICIAN ASSISTANT

## 2022-03-17 PROCEDURE — 85025 COMPLETE CBC W/AUTO DIFF WBC: CPT | Performed by: PHYSICIAN ASSISTANT

## 2022-03-17 PROCEDURE — 94640 AIRWAY INHALATION TREATMENT: CPT

## 2022-03-17 PROCEDURE — 94660 CPAP INITIATION&MGMT: CPT

## 2022-03-17 RX ADMIN — IPRATROPIUM BROMIDE AND ALBUTEROL SULFATE 3 ML: 2.5; .5 SOLUTION RESPIRATORY (INHALATION) at 08:03

## 2022-03-17 RX ADMIN — LEVOFLOXACIN 750 MG: 500 TABLET, FILM COATED ORAL at 09:03

## 2022-03-17 RX ADMIN — SIMETHICONE 80 MG: 80 TABLET, CHEWABLE ORAL at 09:03

## 2022-03-17 RX ADMIN — PANTOPRAZOLE SODIUM 40 MG: 40 TABLET, DELAYED RELEASE ORAL at 09:03

## 2022-03-17 RX ADMIN — MUPIROCIN: 20 OINTMENT TOPICAL at 09:03

## 2022-03-17 RX ADMIN — CARVEDILOL 12.5 MG: 12.5 TABLET, FILM COATED ORAL at 05:03

## 2022-03-17 RX ADMIN — FUROSEMIDE 20 MG: 20 TABLET ORAL at 09:03

## 2022-03-17 RX ADMIN — CARVEDILOL 12.5 MG: 12.5 TABLET, FILM COATED ORAL at 07:03

## 2022-03-17 RX ADMIN — PREDNISONE 40 MG: 20 TABLET ORAL at 09:03

## 2022-03-17 RX ADMIN — IPRATROPIUM BROMIDE AND ALBUTEROL SULFATE 3 ML: 2.5; .5 SOLUTION RESPIRATORY (INHALATION) at 01:03

## 2022-03-17 RX ADMIN — ENOXAPARIN SODIUM 40 MG: 40 INJECTION SUBCUTANEOUS at 05:03

## 2022-03-17 NOTE — PLAN OF CARE
Patient is currently waiting on insurance approval for his home Bipap machine. [employer]ME notified MARV and MD that approval can take 2 to 3 days. They will follow up this morning . Will continue to follow.      Galina Beasley RN  Ext [phone]

## 2022-03-17 NOTE — PLAN OF CARE
Pt is AAOx4. Pt remain free from fall and injuries. VS remain stable. Questions and concerns voiced and answered. Medication compliance. Possible D/C today.  Call light in reach. Bed in low locked position. Side rails x2. Belongings at bedside.

## 2022-03-17 NOTE — PROGRESS NOTES
"Medication Counseling:     3/16/22: I went upstairs to  the patient on the medications he received from bedside delivery. These are some of the things I came across in our discussion:     Breztri - This is his maintenance inhaler. After speaking with the patient, he stated he was using it as need basis when he was short of breath. And when he was short of breath he was using it every 4-6 hours. I counselled him on the importance of taking this medication twice daily every day and not as need basis. I also informed him he has to take it every morning and every evening daily and rinse his mouth after each use.     Spiriva Handihaler - He states he uses this medication once daily. After our discussion, it seemed as though he was having difficulty using the actual device as he kept stating "I don't feel or sense any medication going into my mouth". The handihaler requires using a capsule in the actual device that he has to place and remove after use. I requested Dr. Marilee Jackson to change the Handihaler to Spiriva Respimat (which is a similar device to his other inhalers) so that he does not experience difficulty in using the device. The medication was changed to Spriva Respimat, prior authorization was completed by [facility]. Medication will be delivered to patient once discharge orders are in.     Albuterol - This is his rescue inhaler. I stressed the importance of using this medication as need basis and NOT the Breztri. I explained that when he is short of breath, he will need to use this inhaler every 4-6 hours until his shortness of breath has resolved.     I had the patient explain everything that was discussed back to me in order to ensure that he had an understanding of how to use the inhalers properly.     I also told the patient that I will follow up with him once he is discharged on 3/22 and set up a date that same week where he can come into the pharmacy and we can " go over all his medications (especially his inhalers) in person so that he can show me how he is using the medication.     Sincerely,     Efren Stephen, Pharm D.   Supervisor of Pharmacy Patient Services  [facility]  Ph: [phone]

## 2022-03-17 NOTE — SUBJECTIVE & OBJECTIVE
Telemedicine  This service was provided by Virtual Visit.    Patient was transferred to [facility] on:  3/17/2022    Chief Complaint   Patient presents with    Shortness of Breath     Pt brought to ED via [facility] from home on CPAP for SOB that started a few hours ago.      The patient location is: 4/4 A   Admitted 3/13/2022  7:17 PM  Present with the patient at the time of the telemed/virtual assessment: Telepresenter    Interval History / Events Overnight:   The patient is able to provide adequate history. Additional history was obtained from past medical records. No significant events reported by Nursing.  Patient complains of dyspnea. Symptoms have improved since yesterday. Associated symptoms include: fatigue. Symptoms are decreasing in severity.     Lab test(s) reviewed: H&H stable    Review of Systems   Constitutional:  Negative for fever.   Respiratory:  Negative for chest tightness.      Objective:     Vital Signs (Most Recent):  Temp: 98.9 °F (37.2 °C) (03/17/22 0741)  Pulse: 74 (03/17/22 0810)  Resp: 16 (03/17/22 0810)  BP: (!) 157/72 (03/17/22 0741)  SpO2: 95 % (03/17/22 0810)   Vital Signs (24h Range):  Temp:  [97.5 °F (36.4 °C)-98.9 °F (37.2 °C)] 98.9 °F (37.2 °C)  Pulse:  [61-78] 74  Resp:  [16-20] 16  SpO2:  [94 %-98 %] 95 %  BP: (137-169)/(67-93) 157/72     Weight: 68.4 kg (150 lb 12.7 oz)  Body mass index is 23.62 kg/m².    Intake/Output Summary (Last 24 hours) at 3/17/2022 1105  Last data filed at 3/17/2022 0635  Gross per 24 hour   Intake 250 ml   Output 120 ml   Net 130 ml      Physical Exam  Constitutional:       General: He is not in acute distress.     Appearance: Normal appearance.   Eyes:      General: Lids are normal. No scleral icterus.        Right eye: No discharge.         Left eye: No discharge.      Conjunctiva/sclera: Conjunctivae normal.   Neck:      Trachea: Phonation normal.   Cardiovascular:      Rate and Rhythm: Normal rate.      Comments: Monitor / Vital signs  reviewed at time of visit  Pulmonary:      Effort: Pulmonary effort is normal. No tachypnea, accessory muscle usage or respiratory distress.   Abdominal:      General: There is no distension.   Neurological:      Mental Status: He is alert. He is not disoriented.   Psychiatric:         Attention and Perception: Attention normal.         Mood and Affect: Affect normal.         Behavior: Behavior is cooperative.       Significant Labs:    Recent Labs   Lab 02/10/22  0345   HGBA1C 5.7*     Recent Labs   Lab 03/15/22  0419 03/16/22  0455 03/17/22  0538   WBC 10.31 9.13 9.15   HGB 10.8* 12.2* 12.1*   HCT 34.8* 40.3 39.9*    260 254     Recent Labs   Lab 03/15/22  0419 03/16/22  0455 03/17/22  0538   GRAN 75.9*  7.8* 71.4  6.5 75.0*  6.9   LYMPH 14.3*  1.5 17.1*  1.6 16.7*  1.5   MONO 8.5  0.9 10.3  0.9 7.4  0.7   EOS 0.0 0.0 0.0     Recent Labs   Lab 03/15/22  0419 03/16/22  0455 03/17/22  0538    141 139   K 4.1 4.1 4.1   CL 98 97 97   CO2 30* 32* 33*   BUN 18 17 16   CREATININE 0.7 0.9 0.8   GLU 99 63* 99   CALCIUM 8.9 9.6 9.0   ALBUMIN 2.5* 2.8* 2.6*   MG 2.0 2.0 1.9   PHOS 2.7 3.5 3.3     Recent Labs   Lab 03/14/22  0552 03/15/22  0419 03/16/22  0455 03/17/22  0538   ALKPHOS 82 67 73 68   ALT 30 25 29 29   AST 19 15 16 15   PROT 6.1 5.6* 6.3 5.8*   BILITOT 0.3 0.2 0.2 0.2   INR 1.0  --   --   --      Recent Labs   Lab 03/13/22 1924 03/13/22 2020   TROPONINI  --  0.025   *  --      Recent Labs   Lab 12/21/21  1849 01/28/22  0514 03/06/22  0348   PROCAL  --   --  0.10   DDIMER 0.91* 0.67*  --      SARS-CoV2 (COVID-19) Qualitative PCR (no units)   Date Value   08/19/2021 Not Detected   08/05/2021 Detected (A)   07/29/2021 Not Detected   07/22/2021 Not Detected   06/24/2021 Not Detected   06/17/2021 Not Detected   04/24/2021 Not Detected   04/23/2020 Not Detected   04/21/2020 Not Detected     SARS-CoV-2 RNA, Amplification, Qual (no units)   Date Value   06/10/2021 Negative   07/20/2020  Negative   06/25/2020 Negative   06/04/2020 Negative   05/23/2020 Negative   05/12/2020 Negative   05/06/2020 Negative   04/19/2020 Negative   04/03/2020 Negative     POC Rapid COVID (no units)   Date Value   03/13/2022 Negative   03/05/2022 Negative   02/09/2022 Negative   02/07/2022 Negative   01/28/2022 Negative   01/15/2022 Negative   01/01/2022 Negative   12/21/2021 Negative   06/07/2021 Negative   06/03/2021 Negative       ECG Results              EKG 12-lead (Final result)  Result time 03/14/22 16:40:40      Final result by Interface, Lab In [facility] (03/14/22 16:40:40)                   Narrative:    Test Reason : R06.02,    Vent. Rate : 102 BPM     Atrial Rate : 102 BPM     P-R Int : 160 ms          QRS Dur : 086 ms      QT Int : 354 ms       P-R-T Axes : 083 079 085 degrees     QTc Int : 461 ms    Sinus tachycardia  Right atrial enlargement  Borderline Abnormal ECG  When compared with ECG of 05-MAR-2022 15:30,  No significant change was found  Confirmed by Bryan DOBBINS MD ([id number]) on 3/14/2022 3:41:33 PM    Referred By: [email or web address]   SELF           Confirmed By:Bryan DOBBINS MD                                    Results for orders placed during the hospital encounter of 01/28/22    Echo    Interpretation Summary  · The left ventricle is normal in size with normal systolic function.  · Mild left atrial enlargement.  · There is pulmonary hypertension.  · The estimated ejection fraction is 55%.  · Grade I left ventricular diastolic dysfunction.  · Study quality was technically difficult  · Normal right ventricular size with normal right ventricular systolic function.  · Normal central venous pressure (3 mmHg).  · The estimated PA systolic pressure is 50 mmHg.  · Interatrial septal aneurysm  · Mild tricuspid regurgitation.      X-Ray Chest AP Portable  Narrative: EXAMINATION:  XR CHEST AP PORTABLE    CLINICAL HISTORY:  Shortness of breath    TECHNIQUE:  Single frontal view of the chest was  performed.    COMPARISON:  Chest radiograph 03/05/2022    FINDINGS:  Monitoring leads and tubing overlie the chest.    Cardiomediastinal silhouette is midline and stable.  Pulmonary vasculature and hilar contours are unchanged.  Few scattered linear opacities throughout the lungs consistent with minimal platelike scarring versus atelectasis.  Bilateral diffuse nonspecific interstitial coarsening with a perihilar and upper lobe predominance slightly more so on the left, with central peribronchial cuffing and overall increased from 03/05/2022 study.  The lungs are otherwise symmetrically hyperexpanded without large consolidation, pleural effusion or pneumothorax.  No acute osseous process seen.  Impression: Left greater than right perihilar and upper lung zone predominant interval increased nonspecific interstitial coarsening which can be seen with worsening interstitial type pneumonia from inflammatory or infectious process including atypical bacterial or viral etiology versus more atypical distribution of pulmonary edema.  No large consolidation.    Electronically signed by: Mckinley Talbert MD  Date:    03/13/2022  Time:    19:56      Labs and Imaging within the last 24 hours listed above were reviewed.       Diet: Diet Cardiac [facility]  Diet Cardiac  Significant LDAs:   IV Access Type: Peripheral  Urinary Catheter Indication if present: Patient Does Not Have Urinary Catheter  Other Lines/Tubes/Drains:    HIGH RISK CONDITION(S):   Patient has a condition that poses threat to life and bodily function: Severe Respiratory Distress     Goals of Care:    Previous admission:  3/5/22  Likely prognosis:  Fair  Code Status: Full Code  Comfort Only: No  Hospice: No  Goals at discharge: remain at home, with physician follow-up    Discharge Planning   RUTH: 3/18/2022     Code Status: Full Code   Is the patient medically ready for discharge?: Yes    Reason for patient still in hospital (select all that apply): Patient  trending condition and Other (specify) awaiting BiPAP  Discharge Plan A: Home

## 2022-03-17 NOTE — CONSULTS
Thank you for your consult to [facility]. We have reviewed the patient chart. This patient does meet criteria for [facility] service at this time. Will assume care on 03/18/22 at 7AM.    Michelle Espino MD

## 2022-03-18 LAB
ALBUMIN SERPL BCP-MCNC: 2.6 G/DL (ref 3.5–5.2)
ALP SERPL-CCNC: 62 U/L (ref 55–135)
ALT SERPL W/O P-5'-P-CCNC: 25 U/L (ref 10–44)
ANION GAP SERPL CALC-SCNC: 6 MMOL/L (ref 8–16)
AST SERPL-CCNC: 11 U/L (ref 10–40)
BACTERIA BLD CULT: NORMAL
BACTERIA BLD CULT: NORMAL
BASOPHILS # BLD AUTO: 0.01 K/UL (ref 0–0.2)
BASOPHILS NFR BLD: 0.1 % (ref 0–1.9)
BILIRUB SERPL-MCNC: 0.2 MG/DL (ref 0.1–1)
BUN SERPL-MCNC: 16 MG/DL (ref 6–20)
CALCIUM SERPL-MCNC: 9.2 MG/DL (ref 8.7–10.5)
CHLORIDE SERPL-SCNC: 100 MMOL/L (ref 95–110)
CO2 SERPL-SCNC: 35 MMOL/L (ref 23–29)
CREAT SERPL-MCNC: 0.8 MG/DL (ref 0.5–1.4)
DIFFERENTIAL METHOD: ABNORMAL
EOSINOPHIL # BLD AUTO: 0 K/UL (ref 0–0.5)
EOSINOPHIL NFR BLD: 0 % (ref 0–8)
ERYTHROCYTE [DISTWIDTH] IN BLOOD BY AUTOMATED COUNT: 17.8 % (ref 11.5–14.5)
EST. GFR  (AFRICAN AMERICAN): >60 ML/MIN/1.73 M^2
EST. GFR  (NON AFRICAN AMERICAN): >60 ML/MIN/1.73 M^2
GLUCOSE SERPL-MCNC: 128 MG/DL (ref 70–110)
HCT VFR BLD AUTO: 37.4 % (ref 40–54)
HGB BLD-MCNC: 11.5 G/DL (ref 14–18)
IMM GRANULOCYTES # BLD AUTO: 0.1 K/UL (ref 0–0.04)
IMM GRANULOCYTES NFR BLD AUTO: 1 % (ref 0–0.5)
LYMPHOCYTES # BLD AUTO: 1.4 K/UL (ref 1–4.8)
LYMPHOCYTES NFR BLD: 13.3 % (ref 18–48)
MAGNESIUM SERPL-MCNC: 2 MG/DL (ref 1.6–2.6)
MCH RBC QN AUTO: 26.3 PG (ref 27–31)
MCHC RBC AUTO-ENTMCNC: 30.7 G/DL (ref 32–36)
MCV RBC AUTO: 85 FL (ref 82–98)
MONOCYTES # BLD AUTO: 0.8 K/UL (ref 0.3–1)
MONOCYTES NFR BLD: 7.5 % (ref 4–15)
NEUTROPHILS # BLD AUTO: 8 K/UL (ref 1.8–7.7)
NEUTROPHILS NFR BLD: 78.1 % (ref 38–73)
NRBC BLD-RTO: 0 /100 WBC
PHOSPHATE SERPL-MCNC: 2.5 MG/DL (ref 2.7–4.5)
PLATELET # BLD AUTO: 253 K/UL (ref 150–450)
PMV BLD AUTO: 11.2 FL (ref 9.2–12.9)
POTASSIUM SERPL-SCNC: 4.1 MMOL/L (ref 3.5–5.1)
PROT SERPL-MCNC: 5.6 G/DL (ref 6–8.4)
RBC # BLD AUTO: 4.38 M/UL (ref 4.6–6.2)
SODIUM SERPL-SCNC: 141 MMOL/L (ref 136–145)
WBC # BLD AUTO: 10.22 K/UL (ref 3.9–12.7)

## 2022-03-18 PROCEDURE — 25000242 PHARM REV CODE 250 ALT 637 W/ HCPCS: Performed by: PHYSICIAN ASSISTANT

## 2022-03-18 PROCEDURE — 27000221 HC OXYGEN, UP TO 24 HOURS

## 2022-03-18 PROCEDURE — 25000003 PHARM REV CODE 250: Performed by: PHYSICIAN ASSISTANT

## 2022-03-18 PROCEDURE — 36415 COLL VENOUS BLD VENIPUNCTURE: CPT | Performed by: PHYSICIAN ASSISTANT

## 2022-03-18 PROCEDURE — 99232 PR SUBSEQUENT HOSPITAL CARE,LEVL II: ICD-10-PCS | Mod: 95,,, | Performed by: INTERNAL MEDICINE

## 2022-03-18 PROCEDURE — 11000001 HC ACUTE MED/SURG PRIVATE ROOM

## 2022-03-18 PROCEDURE — 25000003 PHARM REV CODE 250: Performed by: STUDENT IN AN ORGANIZED HEALTH CARE EDUCATION/TRAINING PROGRAM

## 2022-03-18 PROCEDURE — 80053 COMPREHEN METABOLIC PANEL: CPT | Performed by: PHYSICIAN ASSISTANT

## 2022-03-18 PROCEDURE — 94660 CPAP INITIATION&MGMT: CPT

## 2022-03-18 PROCEDURE — 94640 AIRWAY INHALATION TREATMENT: CPT

## 2022-03-18 PROCEDURE — 84100 ASSAY OF PHOSPHORUS: CPT | Performed by: PHYSICIAN ASSISTANT

## 2022-03-18 PROCEDURE — 25000003 PHARM REV CODE 250: Performed by: NURSE PRACTITIONER

## 2022-03-18 PROCEDURE — 63600175 PHARM REV CODE 636 W HCPCS: Performed by: PHYSICIAN ASSISTANT

## 2022-03-18 PROCEDURE — 99232 SBSQ HOSP IP/OBS MODERATE 35: CPT | Mod: 95,,, | Performed by: INTERNAL MEDICINE

## 2022-03-18 PROCEDURE — 94761 N-INVAS EAR/PLS OXIMETRY MLT: CPT

## 2022-03-18 PROCEDURE — 85025 COMPLETE CBC W/AUTO DIFF WBC: CPT | Performed by: PHYSICIAN ASSISTANT

## 2022-03-18 PROCEDURE — 25000003 PHARM REV CODE 250: Performed by: INTERNAL MEDICINE

## 2022-03-18 PROCEDURE — 99900035 HC TECH TIME PER 15 MIN (STAT)

## 2022-03-18 PROCEDURE — 83735 ASSAY OF MAGNESIUM: CPT | Performed by: PHYSICIAN ASSISTANT

## 2022-03-18 RX ORDER — SODIUM,POTASSIUM PHOSPHATES 280-250MG
2 POWDER IN PACKET (EA) ORAL
Status: COMPLETED | OUTPATIENT
Start: 2022-03-18 | End: 2022-03-18

## 2022-03-18 RX ORDER — LEVOFLOXACIN 750 MG/1
750 TABLET ORAL DAILY
Qty: 3 TABLET | Refills: 0 | Status: SHIPPED | OUTPATIENT
Start: 2022-03-18 | End: 2022-03-21 | Stop reason: HOSPADM

## 2022-03-18 RX ADMIN — IPRATROPIUM BROMIDE AND ALBUTEROL SULFATE 3 ML: 2.5; .5 SOLUTION RESPIRATORY (INHALATION) at 12:03

## 2022-03-18 RX ADMIN — MUPIROCIN: 20 OINTMENT TOPICAL at 09:03

## 2022-03-18 RX ADMIN — PREDNISONE 40 MG: 20 TABLET ORAL at 10:03

## 2022-03-18 RX ADMIN — FUROSEMIDE 20 MG: 20 TABLET ORAL at 10:03

## 2022-03-18 RX ADMIN — PANTOPRAZOLE SODIUM 40 MG: 40 TABLET, DELAYED RELEASE ORAL at 09:03

## 2022-03-18 RX ADMIN — IPRATROPIUM BROMIDE AND ALBUTEROL SULFATE 3 ML: 2.5; .5 SOLUTION RESPIRATORY (INHALATION) at 07:03

## 2022-03-18 RX ADMIN — POTASSIUM & SODIUM PHOSPHATES POWDER PACK 280-160-250 MG 2 PACKET: 280-160-250 PACK at 07:03

## 2022-03-18 RX ADMIN — LEVOFLOXACIN 750 MG: 500 TABLET, FILM COATED ORAL at 10:03

## 2022-03-18 RX ADMIN — ENOXAPARIN SODIUM 40 MG: 40 INJECTION SUBCUTANEOUS at 06:03

## 2022-03-18 RX ADMIN — CARVEDILOL 12.5 MG: 12.5 TABLET, FILM COATED ORAL at 08:03

## 2022-03-18 RX ADMIN — SIMETHICONE 80 MG: 80 TABLET, CHEWABLE ORAL at 06:03

## 2022-03-18 RX ADMIN — PANTOPRAZOLE SODIUM 40 MG: 40 TABLET, DELAYED RELEASE ORAL at 10:03

## 2022-03-18 RX ADMIN — POTASSIUM & SODIUM PHOSPHATES POWDER PACK 280-160-250 MG 2 PACKET: 280-160-250 PACK at 12:03

## 2022-03-18 RX ADMIN — CARVEDILOL 12.5 MG: 12.5 TABLET, FILM COATED ORAL at 06:03

## 2022-03-18 NOTE — PLAN OF CARE
[facility]  Discharge Reassessment    Primary Care Provider: Rhonda Amarilys Montalvo    Expected Discharge Date: 3/18/2022    Reassessment (most recent)     Discharge Reassessment - 03/18/22 1421        Discharge Reassessment    Assessment Type Discharge Planning Reassessment     Did the patient's condition or plan change since previous assessment? No     Discharge Plan discussed with: Patient;Sibling     Communicated RUTH with patient/caregiver Yes     Discharge Plan A Home     Discharge Plan B Home     DME Needed Upon Discharge  BIPAP     Discharge Barriers Identified Does not adhere to care plan     Why the patient remains in the hospital Insurance issues                 Galina Beasley, RN  Ext [phone]

## 2022-03-18 NOTE — ASSESSMENT & PLAN NOTE
Wean O2 as tolerated to keep O2 saturations >88%  -  Repeat ECG w/no prolonged QTc- stop rocephin and azithro and transition to levaquin   - Continue prednisone  - Scheduled duonebs q 6 hrs

## 2022-03-18 NOTE — SUBJECTIVE & OBJECTIVE
Telemedicine  This service was provided by Virtual Visit.    Patient was transferred to [facility] on:  3/17/2022    Chief Complaint   Patient presents with    Shortness of Breath     Pt brought to ED via NOHD from home on CPAP for SOB that started a few hours ago.      The patient location is: 4/4 A   Admitted 3/13/2022  7:17 PM  Present with the patient at the time of the telemed/virtual assessment: Telepresenter    Interval History / Events Overnight:   The patient is able to provide adequate history. Additional history was obtained from past medical records. No significant events reported by Nursing. BP noted to be elevated.  Patient complains of dyspnea. Symptoms have improved since yesterday. Associated symptoms include: fatigue. Symptoms are decreasing in severity.     Lab test(s) reviewed: H&H stable    Review of Systems   Constitutional:  Negative for fever.   Respiratory:  Negative for chest tightness.      Objective:     Vital Signs (Most Recent):  Temp: 97.9 °F (36.6 °C) (03/18/22 0743)  Pulse: 65 (03/18/22 0748)  Resp: 18 (03/18/22 0748)  BP: (!) 182/91 (03/18/22 0743)  SpO2: 97 % (03/18/22 0748)   Vital Signs (24h Range):  Temp:  [96.8 °F (36 °C)-98 °F (36.7 °C)] 97.9 °F (36.6 °C)  Pulse:  [61-89] 65  Resp:  [16-20] 18  SpO2:  [92 %-98 %] 97 %  BP: (133-182)/(75-91) 182/91     Weight: 68.4 kg (150 lb 12.7 oz)  Body mass index is 23.62 kg/m².  No intake or output data in the 24 hours ending 03/18/22 1139     Physical Exam  Constitutional:       General: He is not in acute distress.     Appearance: Normal appearance.   Eyes:      General: Lids are normal. No scleral icterus.        Right eye: No discharge.         Left eye: No discharge.      Conjunctiva/sclera: Conjunctivae normal.   Neck:      Trachea: Phonation normal.   Cardiovascular:      Rate and Rhythm: Normal rate.      Comments: Monitor / Vital signs reviewed at time of visit  Pulmonary:      Effort: Pulmonary effort is  normal. No tachypnea, accessory muscle usage or respiratory distress.   Abdominal:      General: There is no distension.   Neurological:      Mental Status: He is alert. He is not disoriented.   Psychiatric:         Attention and Perception: Attention normal.         Mood and Affect: Affect normal.         Behavior: Behavior is cooperative.       Significant Labs:    Recent Labs   Lab 02/10/22  0345   HGBA1C 5.7*       Recent Labs   Lab 03/16/22  0455 03/17/22  0538 03/18/22  0520   WBC 9.13 9.15 10.22   HGB 12.2* 12.1* 11.5*   HCT 40.3 39.9* 37.4*    254 253       Recent Labs   Lab 03/16/22  0455 03/17/22  0538 03/18/22  0520   GRAN 71.4  6.5 75.0*  6.9 78.1*  8.0*   LYMPH 17.1*  1.6 16.7*  1.5 13.3*  1.4   MONO 10.3  0.9 7.4  0.7 7.5  0.8   EOS 0.0 0.0 0.0       Recent Labs   Lab 03/16/22  0455 03/17/22  0538 03/18/22  0520    139 141   K 4.1 4.1 4.1   CL 97 97 100   CO2 32* 33* 35*   BUN 17 16 16   CREATININE 0.9 0.8 0.8   GLU 63* 99 128*   CALCIUM 9.6 9.0 9.2   ALBUMIN 2.8* 2.6* 2.6*   MG 2.0 1.9 2.0   PHOS 3.5 3.3 2.5*       Recent Labs   Lab 03/14/22  0552 03/15/22  0419 03/16/22  0455 03/17/22  0538 03/18/22  0520   ALKPHOS 82   < > 73 68 62   ALT 30   < > 29 29 25   AST 19   < > 16 15 11   PROT 6.1   < > 6.3 5.8* 5.6*   BILITOT 0.3   < > 0.2 0.2 0.2   INR 1.0  --   --   --   --     < > = values in this interval not displayed.       Recent Labs   Lab 03/13/22  1924 03/13/22 2020   TROPONINI  --  0.025   *  --        Recent Labs   Lab 12/21/21  1849 01/28/22  0514 03/06/22  0348   PROCAL  --   --  0.10   DDIMER 0.91* 0.67*  --        SARS-CoV2 (COVID-19) Qualitative PCR (no units)   Date Value   08/19/2021 Not Detected   08/05/2021 Detected (A)   07/29/2021 Not Detected   07/22/2021 Not Detected   06/24/2021 Not Detected   06/17/2021 Not Detected   04/24/2021 Not Detected   04/23/2020 Not Detected   04/21/2020 Not Detected     SARS-CoV-2 RNA, Amplification, Qual (no units)   Date  Value   06/10/2021 Negative   07/20/2020 Negative   06/25/2020 Negative   06/04/2020 Negative   05/23/2020 Negative   05/12/2020 Negative   05/06/2020 Negative   04/19/2020 Negative   04/03/2020 Negative     POC Rapid COVID (no units)   Date Value   03/13/2022 Negative   03/05/2022 Negative   02/09/2022 Negative   02/07/2022 Negative   01/28/2022 Negative   01/15/2022 Negative   01/01/2022 Negative   12/21/2021 Negative   06/07/2021 Negative   06/03/2021 Negative       ECG Results              EKG 12-lead (Final result)  Result time 03/14/22 16:40:40      Final result by Interface, Lab In [facility] (03/14/22 16:40:40)                   Narrative:    Test Reason : R06.02,    Vent. Rate : 102 BPM     Atrial Rate : 102 BPM     P-R Int : 160 ms          QRS Dur : 086 ms      QT Int : 354 ms       P-R-T Axes : 083 079 085 degrees     QTc Int : 461 ms    Sinus tachycardia  Right atrial enlargement  Borderline Abnormal ECG  When compared with ECG of 05-MAR-2022 15:30,  No significant change was found  Confirmed by Bryan DOBBINS MD ([id number]) on 3/14/2022 3:41:33 PM    Referred By: [email or web address]   SELF           Confirmed By:Bryan DOBBINS MD                                    Results for orders placed during the hospital encounter of 01/28/22    Echo    Interpretation Summary  · The left ventricle is normal in size with normal systolic function.  · Mild left atrial enlargement.  · There is pulmonary hypertension.  · The estimated ejection fraction is 55%.  · Grade I left ventricular diastolic dysfunction.  · Study quality was technically difficult  · Normal right ventricular size with normal right ventricular systolic function.  · Normal central venous pressure (3 mmHg).  · The estimated PA systolic pressure is 50 mmHg.  · Interatrial septal aneurysm  · Mild tricuspid regurgitation.      X-Ray Chest AP Portable  Narrative: EXAMINATION:  XR CHEST AP PORTABLE    CLINICAL HISTORY:  Shortness of breath    TECHNIQUE:  Single frontal view of the  chest was performed.    COMPARISON:  Chest radiograph 03/05/2022    FINDINGS:  Monitoring leads and tubing overlie the chest.    Cardiomediastinal silhouette is midline and stable.  Pulmonary vasculature and hilar contours are unchanged.  Few scattered linear opacities throughout the lungs consistent with minimal platelike scarring versus atelectasis.  Bilateral diffuse nonspecific interstitial coarsening with a perihilar and upper lobe predominance slightly more so on the left, with central peribronchial cuffing and overall increased from 03/05/2022 study.  The lungs are otherwise symmetrically hyperexpanded without large consolidation, pleural effusion or pneumothorax.  No acute osseous process seen.  Impression: Left greater than right perihilar and upper lung zone predominant interval increased nonspecific interstitial coarsening which can be seen with worsening interstitial type pneumonia from inflammatory or infectious process including atypical bacterial or viral etiology versus more atypical distribution of pulmonary edema.  No large consolidation.    Electronically signed by: Mckinley Talbert MD  Date:    03/13/2022  Time:    19:56      Labs and Imaging within the last 24 hours listed above were reviewed.       Diet: Diet Cardiac [facility]  Diet Cardiac  Significant LDAs:   IV Access Type: Peripheral  Urinary Catheter Indication if present: Patient Does Not Have Urinary Catheter  Other Lines/Tubes/Drains:    HIGH RISK CONDITION(S):   Patient has a condition that poses threat to life and bodily function: Severe Respiratory Distress     Goals of Care:    Previous admission:  3/5/22  Likely prognosis:  Fair  Code Status: Full Code  Comfort Only: No  Hospice: No  Goals at discharge: remain at home, with physician follow-up    Discharge Planning   RUTH: 3/18/2022     Code Status: Full Code   Is the patient medically ready for discharge?: Yes    Reason for patient still in hospital (select all that apply):  Patient trending condition and Other (specify) awaiting BiPAP  Discharge Plan A: Home

## 2022-03-18 NOTE — PROGRESS NOTES
[facility]  Telemedicine Progress Note    Patient Name: Baltazar Mccray  MRN: [id number]  Patient Class: IP- Inpatient   Admission Date: 3/13/2022  Length of Stay: 4 days  Attending Physician: Janice Schultz MD  Primary Care Provider: Jaden      Subjective:     Principal Problem:Acute on chronic respiratory failure with hypercapnia    HPI:  Mei Mccray 61yoM w/PMHx of CHF, COPD, HTN, AFib who presented to [facility] w/ shortness of breath 03/13.  Per EMS, patient had been feeling short of breath all day.  He was found to be hypoxic with increased work of breathing. En route to ED, he received 1 DuoNeb, Solu-Medrol 125mg, magnesium 2 g and 2.5 of Versed for acute agitation.  He was placed on CPAP.     Upon arrival to ED,  patient was tachypneic to 30s and hypertensive 150s/60s. He was transitioned from CPAP to BIPAP. Labs notable for WBC 12.8, Hg 12.5, Bicarb 34, , VBG 7.209/PCO2 104/PO2 27/PHCO3 41/BE 14+- while on Bipap. CXR w/interstitial coarsening- interstitial pneumonia vs.pulm edema. Admitted to MICU due to need for continuous bipap.           Overview/Hospital Course:  ICU Course:  Patient was started on azithro/rocephin for suspected CAP/ treatment for COPD exacerbation. Continued on prednisone 40mg qday. Breathing treatments set at every 6 hours. Patient was weaned to bipap at nighttime and w/naps. On 3L BNC. Most recent VBG w/pH 7.37/pCO2 30/pO2 30/pHCO3 43/BE 18. Deemed stable for transfer to hospital medicine 03/14.    Hospital Medicine Course:  Patient officially transferred to hospital medicine service 03/16. He was doing well- on home oxygen settings. Repeat ECG with normal Qtc. Patient was transitioned to levofloxacin to complete 5d course. Sent script to complete 5d of prednisone total. Outpatient follow-up scheduled w/PCP and pulmonology. Patient was stable for discharge to home 03/16, but had delays in arranging home bipap. Awaiting approval  from insurance company.         Telemedicine  This service was provided by Virtual Visit.    Patient was transferred to [facility] on:  3/17/2022    Chief Complaint   Patient presents with    Shortness of Breath     Pt brought to ED via NOHD from home on CPAP for SOB that started a few hours ago.      The patient location is: 4/4 A   Admitted 3/13/2022  7:17 PM  Present with the patient at the time of the telemed/virtual assessment: Telepresenter    Interval History / Events Overnight:   The patient is able to provide adequate history. Additional history was obtained from past medical records. No significant events reported by Nursing.  Patient complains of dyspnea. Symptoms have improved since yesterday. Associated symptoms include: fatigue. Symptoms are decreasing in severity.     Lab test(s) reviewed: H&H stable    Review of Systems   Constitutional:  Negative for fever.   Respiratory:  Negative for chest tightness.      Objective:     Vital Signs (Most Recent):  Temp: 98.9 °F (37.2 °C) (03/17/22 0741)  Pulse: 74 (03/17/22 0810)  Resp: 16 (03/17/22 0810)  BP: (!) 157/72 (03/17/22 0741)  SpO2: 95 % (03/17/22 0810)   Vital Signs (24h Range):  Temp:  [97.5 °F (36.4 °C)-98.9 °F (37.2 °C)] 98.9 °F (37.2 °C)  Pulse:  [61-78] 74  Resp:  [16-20] 16  SpO2:  [94 %-98 %] 95 %  BP: (137-169)/(67-93) 157/72     Weight: 68.4 kg (150 lb 12.7 oz)  Body mass index is 23.62 kg/m².    Intake/Output Summary (Last 24 hours) at 3/17/2022 1105  Last data filed at 3/17/2022 0635  Gross per 24 hour   Intake 250 ml   Output 120 ml   Net 130 ml      Physical Exam  Constitutional:       General: He is not in acute distress.     Appearance: Normal appearance.   Eyes:      General: Lids are normal. No scleral icterus.        Right eye: No discharge.         Left eye: No discharge.      Conjunctiva/sclera: Conjunctivae normal.   Neck:      Trachea: Phonation normal.   Cardiovascular:      Rate and Rhythm: Normal rate.       Comments: Monitor / Vital signs reviewed at time of visit  Pulmonary:      Effort: Pulmonary effort is normal. No tachypnea, accessory muscle usage or respiratory distress.   Abdominal:      General: There is no distension.   Neurological:      Mental Status: He is alert. He is not disoriented.   Psychiatric:         Attention and Perception: Attention normal.         Mood and Affect: Affect normal.         Behavior: Behavior is cooperative.       Significant Labs:    Recent Labs   Lab 02/10/22  0345   HGBA1C 5.7*     Recent Labs   Lab 03/15/22  0419 03/16/22  0455 03/17/22  0538   WBC 10.31 9.13 9.15   HGB 10.8* 12.2* 12.1*   HCT 34.8* 40.3 39.9*    260 254     Recent Labs   Lab 03/15/22  0419 03/16/22  0455 03/17/22  0538   GRAN 75.9*  7.8* 71.4  6.5 75.0*  6.9   LYMPH 14.3*  1.5 17.1*  1.6 16.7*  1.5   MONO 8.5  0.9 10.3  0.9 7.4  0.7   EOS 0.0 0.0 0.0     Recent Labs   Lab 03/15/22  0419 03/16/22  0455 03/17/22  0538    141 139   K 4.1 4.1 4.1   CL 98 97 97   CO2 30* 32* 33*   BUN 18 17 16   CREATININE 0.7 0.9 0.8   GLU 99 63* 99   CALCIUM 8.9 9.6 9.0   ALBUMIN 2.5* 2.8* 2.6*   MG 2.0 2.0 1.9   PHOS 2.7 3.5 3.3     Recent Labs   Lab 03/14/22  0552 03/15/22  0419 03/16/22  0455 03/17/22  0538   ALKPHOS 82 67 73 68   ALT 30 25 29 29   AST 19 15 16 15   PROT 6.1 5.6* 6.3 5.8*   BILITOT 0.3 0.2 0.2 0.2   INR 1.0  --   --   --      Recent Labs   Lab 03/13/22 1924 03/13/22 2020   TROPONINI  --  0.025   *  --      Recent Labs   Lab 12/21/21  1849 01/28/22  0514 03/06/22  0348   PROCAL  --   --  0.10   DDIMER 0.91* 0.67*  --      SARS-CoV2 (COVID-19) Qualitative PCR (no units)   Date Value   08/19/2021 Not Detected   08/05/2021 Detected (A)   07/29/2021 Not Detected   07/22/2021 Not Detected   06/24/2021 Not Detected   06/17/2021 Not Detected   04/24/2021 Not Detected   04/23/2020 Not Detected   04/21/2020 Not Detected     SARS-CoV-2 RNA, Amplification, Qual (no units)   Date Value    06/10/2021 Negative   07/20/2020 Negative   06/25/2020 Negative   06/04/2020 Negative   05/23/2020 Negative   05/12/2020 Negative   05/06/2020 Negative   04/19/2020 Negative   04/03/2020 Negative     POC Rapid COVID (no units)   Date Value   03/13/2022 Negative   03/05/2022 Negative   02/09/2022 Negative   02/07/2022 Negative   01/28/2022 Negative   01/15/2022 Negative   01/01/2022 Negative   12/21/2021 Negative   06/07/2021 Negative   06/03/2021 Negative       ECG Results              EKG 12-lead (Final result)  Result time 03/14/22 16:40:40      Final result by Interface, Lab In [facility] (03/14/22 16:40:40)                   Narrative:    Test Reason : R06.02,    Vent. Rate : 102 BPM     Atrial Rate : 102 BPM     P-R Int : 160 ms          QRS Dur : 086 ms      QT Int : 354 ms       P-R-T Axes : 083 079 085 degrees     QTc Int : 461 ms    Sinus tachycardia  Right atrial enlargement  Borderline Abnormal ECG  When compared with ECG of 05-MAR-2022 15:30,  No significant change was found  Confirmed by Bryan DOBBINS MD ([id number]) on 3/14/2022 3:41:33 PM    Referred By: [email or web address]   SELF           Confirmed By:Bryan DOBBINS MD                                    Results for orders placed during the hospital encounter of 01/28/22    Echo    Interpretation Summary  · The left ventricle is normal in size with normal systolic function.  · Mild left atrial enlargement.  · There is pulmonary hypertension.  · The estimated ejection fraction is 55%.  · Grade I left ventricular diastolic dysfunction.  · Study quality was technically difficult  · Normal right ventricular size with normal right ventricular systolic function.  · Normal central venous pressure (3 mmHg).  · The estimated PA systolic pressure is 50 mmHg.  · Interatrial septal aneurysm  · Mild tricuspid regurgitation.      X-Ray Chest AP Portable  Narrative: EXAMINATION:  XR CHEST AP PORTABLE    CLINICAL HISTORY:  Shortness of breath    TECHNIQUE:  Single frontal view of the chest  was performed.    COMPARISON:  Chest radiograph 03/05/2022    FINDINGS:  Monitoring leads and tubing overlie the chest.    Cardiomediastinal silhouette is midline and stable.  Pulmonary vasculature and hilar contours are unchanged.  Few scattered linear opacities throughout the lungs consistent with minimal platelike scarring versus atelectasis.  Bilateral diffuse nonspecific interstitial coarsening with a perihilar and upper lobe predominance slightly more so on the left, with central peribronchial cuffing and overall increased from 03/05/2022 study.  The lungs are otherwise symmetrically hyperexpanded without large consolidation, pleural effusion or pneumothorax.  No acute osseous process seen.  Impression: Left greater than right perihilar and upper lung zone predominant interval increased nonspecific interstitial coarsening which can be seen with worsening interstitial type pneumonia from inflammatory or infectious process including atypical bacterial or viral etiology versus more atypical distribution of pulmonary edema.  No large consolidation.    Electronically signed by: Mckinley Talbert MD  Date:    03/13/2022  Time:    19:56      Labs and Imaging within the last 24 hours listed above were reviewed.       Diet: Diet Cardiac [facility]  Diet Cardiac  Significant LDAs:   IV Access Type: Peripheral  Urinary Catheter Indication if present: Patient Does Not Have Urinary Catheter  Other Lines/Tubes/Drains:    HIGH RISK CONDITION(S):   Patient has a condition that poses threat to life and bodily function: Severe Respiratory Distress     Goals of Care:    Previous admission:  3/5/22  Likely prognosis:  Fair  Code Status: Full Code  Comfort Only: No  Hospice: No  Goals at discharge: remain at home, with physician follow-up    Discharge Planning   RUTH: 3/18/2022     Code Status: Full Code   Is the patient medically ready for discharge?: Yes    Reason for patient still in hospital (select all that apply): Patient  trending condition and Other (specify) awaiting BiPAP  Discharge Plan A: Home          Assessment/Plan:      * Acute on chronic respiratory failure with hypercapnia  Uses home O2 at 2 - 3 LPM  Needs BiPAP    BiPAP (biphasic positive airway pressure) dependence  Awaiting insurance approval for BiPAP    Chronic diastolic heart failure  Control BP    Chronic obstructive pulmonary disease  Patient having difficulty using his inhalers - PharmD assistance appreciated.    Tobacco abuse  - Cessation counseling  - Nicotine patch if needed    Essential hypertension  - Continue home Coreg    COPD exacerbation  Wean O2 as tolerated to keep O2 saturations >88%  -  Repeat ECG w/no prolonged QTc- stop rocephin and azithro and transition to levaquin   - Continue prednisone  - Scheduled duonebs q 6 hrs        Active Hospital Problems    Diagnosis  POA    *Acute on chronic respiratory failure with hypercapnia [J96.22]  Yes    BiPAP (biphasic positive airway pressure) dependence [Z99.89]  Not Applicable    Chronic diastolic heart failure [I50.32]  Yes    Chronic obstructive pulmonary disease [J44.9]  Yes     Chronic     Per my interpretation spirometry shows significant obstruction-FEV1 28% of predicted, moderate restriction and significantly decreased DLCO.     MMRC 3-4 symptoms of dyspnea-has home oxygen  Greater than 10 COPD exacerbations within 1 year;  Had recent exacerbation few weeks ago.  Prescribe prednisone and antibiotics seems to be better.  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       Has failed previous inhaler therapy (advair and Spiriva)   Also, discussed pulmonary rehabilitation   May be considered for advanced  lung follow-up- he patient is 61 years old.    Explains he needs to remain smoke free for approximately 6 months    -Encouraged complete smoking cessation. I have referred you to the smoking cessation program.   -Start Breztri. Rinse mouth after use.   -Referal to pulm rehab placed- patient interested.   -Continue albuterol 2 puffs every 4-6 hours as needed for shortness of breath or wheezing.   - Please obtain CT of chest in relation to previous abnormal CT - for follow up. Would recommend annual low dose CT screening in relation to your smoking history.   -Please make sure pneumonia vaccine is up to date. Thinks could have obtained the pneumonia vaccine. Would like to follow up with daughters of adelita and [employer].   -Encourage COVID vaccine.   -Continue home oxygen as prescribed.   -Follow up in 8 weeks or sooner if needed.       Tobacco abuse [Z72.0]  Yes     Chronic    Essential hypertension [I10]  Yes     Chronic    COPD exacerbation [J44.1]  Yes      Resolved Hospital Problems   No resolved problems to display.       Inpatient Medications Prescribed for Management of Current Problems:     Scheduled Meds:    albuterol-ipratropium  3 mL Nebulization Q6H    carvediloL  12.5 mg Oral BID WM    enoxaparin  40 mg Subcutaneous Daily    furosemide  20 mg Oral Daily    levoFLOXacin  750 mg Oral Daily    mupirocin   Nasal BID    pantoprazole  40 mg Oral BID    predniSONE  40 mg Oral Daily     Continuous Infusions:   As Needed: cloNIDine, hydrALAZINE, influenza, simethicone, sodium chloride 0.9%    VTE Risk Mitigation (From admission, onward)         Ordered     enoxaparin injection 40 mg  Daily         03/13/22 2246     IP VTE HIGH RISK PATIENT  Once         03/13/22 2246     Place sequential compression device  Until discontinued         03/13/22 2246              I have assessed these finding virtually using telemed platform and with assistance of bedside nurse     The attending portion of this  evaluation, treatment, and documentation was performed per Janice Schultz MD via Telemedicine AudioVisual using the secure EverTrue software platform with 2 way audio/video. The provider was located off-site and the patient is located in the hospital. The aforementioned video software was utilized to document the relevant history and physical exam.    Janice Schultz MD  Department of Hospital Medicine   [facility]

## 2022-03-18 NOTE — PLAN OF CARE
AAOx4. No falls today. Vs stable. No complaints of pain. Bipap used overnight, possible DC today. No other complaints. Will continue to monitor.

## 2022-03-18 NOTE — PLAN OF CARE
CM informed by [facility] that we are still waiting on insurance approval for patient's Bipap.     10:45AM  CM informed by [facility] that Medicaid is currently showing his address is [facility] in [place], [place]. Notified patient and his sister Viry of above conversation. Viry stated that she will assist her brother in calling Medicaid to correct his address and she is his dedicated spokesperson.     13:40PM  Call received from Viry informing this CM that Medicaid informed her that they can not change Mr. Mccray's address until the Medicaid analyst speaks with [facility]. Until patient's address is updated, the Bipap will not be approved by Medicaid. Dr. Schultz notified of above conversation. Will continue to follow.      Galina Beasley RN  Ext [phone]

## 2022-03-19 LAB
ALBUMIN SERPL BCP-MCNC: 2.7 G/DL (ref 3.5–5.2)
ALP SERPL-CCNC: 62 U/L (ref 55–135)
ALT SERPL W/O P-5'-P-CCNC: 26 U/L (ref 10–44)
ANION GAP SERPL CALC-SCNC: 5 MMOL/L (ref 8–16)
AST SERPL-CCNC: 12 U/L (ref 10–40)
BASOPHILS # BLD AUTO: 0.01 K/UL (ref 0–0.2)
BASOPHILS NFR BLD: 0.1 % (ref 0–1.9)
BILIRUB SERPL-MCNC: 0.2 MG/DL (ref 0.1–1)
BUN SERPL-MCNC: 16 MG/DL (ref 6–20)
CALCIUM SERPL-MCNC: 9.1 MG/DL (ref 8.7–10.5)
CHLORIDE SERPL-SCNC: 102 MMOL/L (ref 95–110)
CO2 SERPL-SCNC: 32 MMOL/L (ref 23–29)
CREAT SERPL-MCNC: 0.8 MG/DL (ref 0.5–1.4)
DIFFERENTIAL METHOD: ABNORMAL
EOSINOPHIL # BLD AUTO: 0 K/UL (ref 0–0.5)
EOSINOPHIL NFR BLD: 0 % (ref 0–8)
ERYTHROCYTE [DISTWIDTH] IN BLOOD BY AUTOMATED COUNT: 18.3 % (ref 11.5–14.5)
EST. GFR  (AFRICAN AMERICAN): >60 ML/MIN/1.73 M^2
EST. GFR  (NON AFRICAN AMERICAN): >60 ML/MIN/1.73 M^2
GLUCOSE SERPL-MCNC: 74 MG/DL (ref 70–110)
HCT VFR BLD AUTO: 38.6 % (ref 40–54)
HGB BLD-MCNC: 11.6 G/DL (ref 14–18)
IMM GRANULOCYTES # BLD AUTO: 0.06 K/UL (ref 0–0.04)
IMM GRANULOCYTES NFR BLD AUTO: 0.6 % (ref 0–0.5)
LYMPHOCYTES # BLD AUTO: 1.1 K/UL (ref 1–4.8)
LYMPHOCYTES NFR BLD: 11.5 % (ref 18–48)
MAGNESIUM SERPL-MCNC: 1.9 MG/DL (ref 1.6–2.6)
MCH RBC QN AUTO: 26.1 PG (ref 27–31)
MCHC RBC AUTO-ENTMCNC: 30.1 G/DL (ref 32–36)
MCV RBC AUTO: 87 FL (ref 82–98)
MONOCYTES # BLD AUTO: 0.4 K/UL (ref 0.3–1)
MONOCYTES NFR BLD: 4.5 % (ref 4–15)
NEUTROPHILS # BLD AUTO: 7.8 K/UL (ref 1.8–7.7)
NEUTROPHILS NFR BLD: 83.3 % (ref 38–73)
NRBC BLD-RTO: 0 /100 WBC
PHOSPHATE SERPL-MCNC: 3.3 MG/DL (ref 2.7–4.5)
PLATELET # BLD AUTO: 270 K/UL (ref 150–450)
PMV BLD AUTO: 11.3 FL (ref 9.2–12.9)
POTASSIUM SERPL-SCNC: 4.6 MMOL/L (ref 3.5–5.1)
PROT SERPL-MCNC: 5.8 G/DL (ref 6–8.4)
RBC # BLD AUTO: 4.45 M/UL (ref 4.6–6.2)
SODIUM SERPL-SCNC: 139 MMOL/L (ref 136–145)
WBC # BLD AUTO: 9.31 K/UL (ref 3.9–12.7)

## 2022-03-19 PROCEDURE — 36415 COLL VENOUS BLD VENIPUNCTURE: CPT | Performed by: PHYSICIAN ASSISTANT

## 2022-03-19 PROCEDURE — 25000003 PHARM REV CODE 250: Performed by: NURSE PRACTITIONER

## 2022-03-19 PROCEDURE — 99900035 HC TECH TIME PER 15 MIN (STAT)

## 2022-03-19 PROCEDURE — 94761 N-INVAS EAR/PLS OXIMETRY MLT: CPT

## 2022-03-19 PROCEDURE — 25000003 PHARM REV CODE 250: Performed by: INTERNAL MEDICINE

## 2022-03-19 PROCEDURE — 83735 ASSAY OF MAGNESIUM: CPT | Performed by: PHYSICIAN ASSISTANT

## 2022-03-19 PROCEDURE — 99232 PR SUBSEQUENT HOSPITAL CARE,LEVL II: ICD-10-PCS | Mod: 95,,, | Performed by: INTERNAL MEDICINE

## 2022-03-19 PROCEDURE — 27000221 HC OXYGEN, UP TO 24 HOURS

## 2022-03-19 PROCEDURE — 80053 COMPREHEN METABOLIC PANEL: CPT | Performed by: PHYSICIAN ASSISTANT

## 2022-03-19 PROCEDURE — 84100 ASSAY OF PHOSPHORUS: CPT | Performed by: PHYSICIAN ASSISTANT

## 2022-03-19 PROCEDURE — 25000003 PHARM REV CODE 250: Performed by: STUDENT IN AN ORGANIZED HEALTH CARE EDUCATION/TRAINING PROGRAM

## 2022-03-19 PROCEDURE — 25000003 PHARM REV CODE 250: Performed by: PHYSICIAN ASSISTANT

## 2022-03-19 PROCEDURE — 85025 COMPLETE CBC W/AUTO DIFF WBC: CPT | Performed by: PHYSICIAN ASSISTANT

## 2022-03-19 PROCEDURE — 25000242 PHARM REV CODE 250 ALT 637 W/ HCPCS: Performed by: PHYSICIAN ASSISTANT

## 2022-03-19 PROCEDURE — 99232 SBSQ HOSP IP/OBS MODERATE 35: CPT | Mod: 95,,, | Performed by: INTERNAL MEDICINE

## 2022-03-19 PROCEDURE — S4991 NICOTINE PATCH NONLEGEND: HCPCS | Performed by: INTERNAL MEDICINE

## 2022-03-19 PROCEDURE — 94640 AIRWAY INHALATION TREATMENT: CPT

## 2022-03-19 PROCEDURE — 11000001 HC ACUTE MED/SURG PRIVATE ROOM

## 2022-03-19 PROCEDURE — 94660 CPAP INITIATION&MGMT: CPT

## 2022-03-19 RX ORDER — IBUPROFEN 200 MG
1 TABLET ORAL DAILY
Status: DISCONTINUED | OUTPATIENT
Start: 2022-03-19 | End: 2022-04-01 | Stop reason: HOSPADM

## 2022-03-19 RX ADMIN — NICOTINE 1 PATCH: 14 PATCH, EXTENDED RELEASE TRANSDERMAL at 08:03

## 2022-03-19 RX ADMIN — CARVEDILOL 12.5 MG: 12.5 TABLET, FILM COATED ORAL at 05:03

## 2022-03-19 RX ADMIN — SIMETHICONE 80 MG: 80 TABLET, CHEWABLE ORAL at 09:03

## 2022-03-19 RX ADMIN — IPRATROPIUM BROMIDE AND ALBUTEROL SULFATE 3 ML: 2.5; .5 SOLUTION RESPIRATORY (INHALATION) at 01:03

## 2022-03-19 RX ADMIN — IPRATROPIUM BROMIDE AND ALBUTEROL SULFATE 3 ML: 2.5; .5 SOLUTION RESPIRATORY (INHALATION) at 07:03

## 2022-03-19 RX ADMIN — LEVOFLOXACIN 750 MG: 500 TABLET, FILM COATED ORAL at 09:03

## 2022-03-19 RX ADMIN — PANTOPRAZOLE SODIUM 40 MG: 40 TABLET, DELAYED RELEASE ORAL at 09:03

## 2022-03-19 RX ADMIN — FUROSEMIDE 20 MG: 20 TABLET ORAL at 09:03

## 2022-03-19 RX ADMIN — CARVEDILOL 12.5 MG: 12.5 TABLET, FILM COATED ORAL at 09:03

## 2022-03-19 RX ADMIN — PANTOPRAZOLE SODIUM 40 MG: 40 TABLET, DELAYED RELEASE ORAL at 08:03

## 2022-03-19 NOTE — PROGRESS NOTES
[facility] - [facility] Medicine  Telemedicine Progress Note    Patient Name: Baltazar Mccray  MRN: [id number]  Patient Class: IP- Inpatient   Admission Date: 3/13/2022  Length of Stay: 5 days  Attending Physician: Janice Schultz MD  Primary Care Provider: Jaden      Subjective:     Principal Problem:Acute on chronic respiratory failure with hypercapnia    HPI:  Mei Mccray 61yoM w/PMHx of CHF, COPD, HTN, AFib who presented to [facility] w/ shortness of breath 03/13.  Per EMS, patient had been feeling short of breath all day.  He was found to be hypoxic with increased work of breathing. En route to ED, he received 1 DuoNeb, Solu-Medrol 125mg, magnesium 2 g and 2.5 of Versed for acute agitation.  He was placed on CPAP.     Upon arrival to ED,  patient was tachypneic to 30s and hypertensive 150s/60s. He was transitioned from CPAP to BIPAP. Labs notable for WBC 12.8, Hg 12.5, Bicarb 34, , VBG 7.209/PCO2 104/PO2 27/PHCO3 41/BE 14+- while on Bipap. CXR w/interstitial coarsening- interstitial pneumonia vs.pulm edema. Admitted to MICU due to need for continuous bipap.         Overview/Hospital Course:  ICU Course:  Patient was started on azithro/rocephin for suspected CAP/ treatment for COPD exacerbation. Continued on prednisone 40mg qday. Breathing treatments set at every 6 hours. Patient was weaned to bipap at nighttime and w/naps. On 3L BNC. Most recent VBG w/pH 7.37/pCO2 30/pO2 30/pHCO3 43/BE 18. Deemed stable for transfer to hospital medicine 03/14.    Hospital Medicine Course:  Patient officially transferred to hospital medicine service 03/16. He was doing well- on home oxygen settings. Repeat ECG with normal Qtc. Patient was transitioned to levofloxacin to complete 5d course. Sent script to complete 5d of prednisone total. Outpatient follow-up scheduled w/PCP and pulmonology. Patient was stable for discharge to home 03/16, but had delays in arranging home bipap. Awaiting approval from  insurance company.         Telemedicine  This service was provided by Virtual Visit.    Patient was transferred to [facility] on:  3/17/2022    Chief Complaint   Patient presents with    Shortness of Breath     Pt brought to ED via NOHD from home on CPAP for SOB that started a few hours ago.      The patient location is: 4/4 A   Admitted 3/13/2022  7:17 PM  Present with the patient at the time of the telemed/virtual assessment: Telepresenter    Interval History / Events Overnight:   The patient is able to provide adequate history. Additional history was obtained from past medical records. No significant events reported by Nursing. BP noted to be elevated.  Patient complains of dyspnea. Symptoms have improved since yesterday. Associated symptoms include: fatigue. Symptoms are decreasing in severity.     Lab test(s) reviewed: H&H stable    Review of Systems   Constitutional:  Negative for fever.   Respiratory:  Negative for chest tightness.      Objective:     Vital Signs (Most Recent):  Temp: 97.9 °F (36.6 °C) (03/18/22 0743)  Pulse: 65 (03/18/22 0748)  Resp: 18 (03/18/22 0748)  BP: (!) 182/91 (03/18/22 0743)  SpO2: 97 % (03/18/22 0748)   Vital Signs (24h Range):  Temp:  [96.8 °F (36 °C)-98 °F (36.7 °C)] 97.9 °F (36.6 °C)  Pulse:  [61-89] 65  Resp:  [16-20] 18  SpO2:  [92 %-98 %] 97 %  BP: (133-182)/(75-91) 182/91     Weight: 68.4 kg (150 lb 12.7 oz)  Body mass index is 23.62 kg/m².  No intake or output data in the 24 hours ending 03/18/22 1139     Physical Exam  Constitutional:       General: He is not in acute distress.     Appearance: Normal appearance.   Eyes:      General: Lids are normal. No scleral icterus.        Right eye: No discharge.         Left eye: No discharge.      Conjunctiva/sclera: Conjunctivae normal.   Neck:      Trachea: Phonation normal.   Cardiovascular:      Rate and Rhythm: Normal rate.      Comments: Monitor / Vital signs reviewed at time of visit  Pulmonary:       Effort: Pulmonary effort is normal. No tachypnea, accessory muscle usage or respiratory distress.   Abdominal:      General: There is no distension.   Neurological:      Mental Status: He is alert. He is not disoriented.   Psychiatric:         Attention and Perception: Attention normal.         Mood and Affect: Affect normal.         Behavior: Behavior is cooperative.       Significant Labs:    Recent Labs   Lab 02/10/22  0345   HGBA1C 5.7*       Recent Labs   Lab 03/16/22  0455 03/17/22  0538 03/18/22  0520   WBC 9.13 9.15 10.22   HGB 12.2* 12.1* 11.5*   HCT 40.3 39.9* 37.4*    254 253       Recent Labs   Lab 03/16/22 0455 03/17/22  0538 03/18/22  0520   GRAN 71.4  6.5 75.0*  6.9 78.1*  8.0*   LYMPH 17.1*  1.6 16.7*  1.5 13.3*  1.4   MONO 10.3  0.9 7.4  0.7 7.5  0.8   EOS 0.0 0.0 0.0       Recent Labs   Lab 03/16/22  0455 03/17/22  0538 03/18/22  0520    139 141   K 4.1 4.1 4.1   CL 97 97 100   CO2 32* 33* 35*   BUN 17 16 16   CREATININE 0.9 0.8 0.8   GLU 63* 99 128*   CALCIUM 9.6 9.0 9.2   ALBUMIN 2.8* 2.6* 2.6*   MG 2.0 1.9 2.0   PHOS 3.5 3.3 2.5*       Recent Labs   Lab 03/14/22  0552 03/15/22  0419 03/16/22 0455 03/17/22  0538 03/18/22  0520   ALKPHOS 82   < > 73 68 62   ALT 30   < > 29 29 25   AST 19   < > 16 15 11   PROT 6.1   < > 6.3 5.8* 5.6*   BILITOT 0.3   < > 0.2 0.2 0.2   INR 1.0  --   --   --   --     < > = values in this interval not displayed.       Recent Labs   Lab 03/13/22 1924 03/13/22 2020   TROPONINI  --  0.025   *  --        Recent Labs   Lab 12/21/21  1849 01/28/22  0514 03/06/22  0348   PROCAL  --   --  0.10   DDIMER 0.91* 0.67*  --        SARS-CoV2 (COVID-19) Qualitative PCR (no units)   Date Value   08/19/2021 Not Detected   08/05/2021 Detected (A)   07/29/2021 Not Detected   07/22/2021 Not Detected   06/24/2021 Not Detected   06/17/2021 Not Detected   04/24/2021 Not Detected   04/23/2020 Not Detected   04/21/2020 Not Detected     SARS-CoV-2 RNA,  Amplification, Qual (no units)   Date Value   06/10/2021 Negative   07/20/2020 Negative   06/25/2020 Negative   06/04/2020 Negative   05/23/2020 Negative   05/12/2020 Negative   05/06/2020 Negative   04/19/2020 Negative   04/03/2020 Negative     POC Rapid COVID (no units)   Date Value   03/13/2022 Negative   03/05/2022 Negative   02/09/2022 Negative   02/07/2022 Negative   01/28/2022 Negative   01/15/2022 Negative   01/01/2022 Negative   12/21/2021 Negative   06/07/2021 Negative   06/03/2021 Negative       ECG Results              EKG 12-lead (Final result)  Result time 03/14/22 16:40:40      Final result by Interface, Lab In [facility] (03/14/22 16:40:40)                   Narrative:    Test Reason : R06.02,    Vent. Rate : 102 BPM     Atrial Rate : 102 BPM     P-R Int : 160 ms          QRS Dur : 086 ms      QT Int : 354 ms       P-R-T Axes : 083 079 085 degrees     QTc Int : 461 ms    Sinus tachycardia  Right atrial enlargement  Borderline Abnormal ECG  When compared with ECG of 05-MAR-2022 15:30,  No significant change was found  Confirmed by Bryan DOBBINS MD ([id number]) on 3/14/2022 3:41:33 PM    Referred By: [email or web address]   SELF           Confirmed By:Bryan DOBBINS MD                                    Results for orders placed during the hospital encounter of 01/28/22    Echo    Interpretation Summary  · The left ventricle is normal in size with normal systolic function.  · Mild left atrial enlargement.  · There is pulmonary hypertension.  · The estimated ejection fraction is 55%.  · Grade I left ventricular diastolic dysfunction.  · Study quality was technically difficult  · Normal right ventricular size with normal right ventricular systolic function.  · Normal central venous pressure (3 mmHg).  · The estimated PA systolic pressure is 50 mmHg.  · Interatrial septal aneurysm  · Mild tricuspid regurgitation.      X-Ray Chest AP Portable  Narrative: EXAMINATION:  XR CHEST AP PORTABLE    CLINICAL HISTORY:  Shortness of  breath    TECHNIQUE:  Single frontal view of the chest was performed.    COMPARISON:  Chest radiograph 03/05/2022    FINDINGS:  Monitoring leads and tubing overlie the chest.    Cardiomediastinal silhouette is midline and stable.  Pulmonary vasculature and hilar contours are unchanged.  Few scattered linear opacities throughout the lungs consistent with minimal platelike scarring versus atelectasis.  Bilateral diffuse nonspecific interstitial coarsening with a perihilar and upper lobe predominance slightly more so on the left, with central peribronchial cuffing and overall increased from 03/05/2022 study.  The lungs are otherwise symmetrically hyperexpanded without large consolidation, pleural effusion or pneumothorax.  No acute osseous process seen.  Impression: Left greater than right perihilar and upper lung zone predominant interval increased nonspecific interstitial coarsening which can be seen with worsening interstitial type pneumonia from inflammatory or infectious process including atypical bacterial or viral etiology versus more atypical distribution of pulmonary edema.  No large consolidation.    Electronically signed by: Mckinley Talbert MD  Date:    03/13/2022  Time:    19:56      Labs and Imaging within the last 24 hours listed above were reviewed.       Diet: Diet Cardiac [facility]  Diet Cardiac  Significant LDAs:   IV Access Type: Peripheral  Urinary Catheter Indication if present: Patient Does Not Have Urinary Catheter  Other Lines/Tubes/Drains:    HIGH RISK CONDITION(S):   Patient has a condition that poses threat to life and bodily function: Severe Respiratory Distress     Goals of Care:    Previous admission:  3/5/22  Likely prognosis:  Fair  Code Status: Full Code  Comfort Only: No  Hospice: No  Goals at discharge: remain at home, with physician follow-up    Discharge Planning   RUTH: 3/18/2022     Code Status: Full Code   Is the patient medically ready for discharge?: Yes    Reason for  patient still in hospital (select all that apply): Patient trending condition and Other (specify) awaiting BiPAP  Discharge Plan A: Home        Assessment/Plan:      * Acute on chronic respiratory failure with hypercapnia  Uses home O2 at 2 - 3 LPM  Needs BiPAP - awaiting Medicaid approval    BiPAP (biphasic positive airway pressure) dependence  Awaiting insurance approval for BiPAP    Chronic diastolic heart failure  Control BP; continuing home medications  Monitor    Chronic obstructive pulmonary disease  Patient having difficulty using his inhalers - PharmD assistance appreciated.    Tobacco abuse  - Cessation counseling  - Nicotine patch if needed    Essential hypertension  - Continue home Coreg and Lasix    COPD exacerbation  Wean O2 as tolerated to keep O2 saturations >88%  -  Repeat ECG without prolonged QTc- stopped Rocephin and azithro and transition to levaquin   - Continue prednisone  - Scheduled duonebs q 6 hrs        Active Hospital Problems    Diagnosis  POA    *Acute on chronic respiratory failure with hypercapnia [J96.22]  Yes    BiPAP (biphasic positive airway pressure) dependence [Z99.89]  Not Applicable    Chronic diastolic heart failure [I50.32]  Yes    Chronic obstructive pulmonary disease [J44.9]  Yes     Chronic     Per my interpretation spirometry shows significant obstruction-FEV1 28% of predicted, moderate restriction and significantly decreased DLCO.     MMRC 3-4 symptoms of dyspnea-has home oxygen  Greater than 10 COPD exacerbations within 1 year;  Had recent exacerbation few weeks ago.  Prescribe prednisone and antibiotics seems to be better.  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        Has failed previous inhaler therapy (advair and Spiriva)   Also, discussed pulmonary rehabilitation   May be considered for advanced lung follow-up- he patient is 61 years old.    Explains he needs to remain smoke free for approximately 6 months    -Encouraged complete smoking cessation. I have referred you to the smoking cessation program.   -Start Breztri. Rinse mouth after use.   -Referal to pulm rehab placed- patient interested.   -Continue albuterol 2 puffs every 4-6 hours as needed for shortness of breath or wheezing.   - Please obtain CT of chest in relation to previous abnormal CT - for follow up. Would recommend annual low dose CT screening in relation to your smoking history.   -Please make sure pneumonia vaccine is up to date. Thinks could have obtained the pneumonia vaccine. Would like to follow up with daughters of adelita and [employer].   -Encourage COVID vaccine.   -Continue home oxygen as prescribed.   -Follow up in 8 weeks or sooner if needed.       Tobacco abuse [Z72.0]  Yes     Chronic    Essential hypertension [I10]  Yes     Chronic    COPD exacerbation [J44.1]  Yes      Resolved Hospital Problems   No resolved problems to display.       Inpatient Medications Prescribed for Management of Current Problems:     Scheduled Meds:    albuterol-ipratropium  3 mL Nebulization Q6H    carvediloL  12.5 mg Oral BID WM    enoxaparin  40 mg Subcutaneous Daily    furosemide  20 mg Oral Daily    levoFLOXacin  750 mg Oral Daily    mupirocin   Nasal BID    pantoprazole  40 mg Oral BID     Continuous Infusions:   As Needed: cloNIDine, hydrALAZINE, influenza, simethicone, sodium chloride 0.9%    VTE Risk Mitigation (From admission, onward)         Ordered     enoxaparin injection 40 mg  Daily         03/13/22 2246     IP VTE HIGH RISK PATIENT  Once         03/13/22 2246     Place sequential compression device  Until discontinued         03/13/22 2246              I have assessed these finding  virtually using telemed platform and with assistance of bedside nurse     The attending portion of this evaluation, treatment, and documentation was performed per Janice Schultz MD via Telemedicine AudioVisual using the secure [employer] software platform with 2 way audio/video. The provider was located off-site and the patient is located in the hospital. The aforementioned video software was utilized to document the relevant history and physical exam    Janice Schultz MD  Department of Hospital Medicine   [facility]

## 2022-03-19 NOTE — ASSESSMENT & PLAN NOTE
Wean O2 as tolerated to keep O2 saturations >88%  -  Repeat ECG without prolonged QTc- stopped Rocephin and azithro and transition to levaquin   - Continue prednisone  - Scheduled duonebs q 6 hrs

## 2022-03-19 NOTE — SUBJECTIVE & OBJECTIVE
Telemedicine  This service was provided by Virtual Visit.    Patient was transferred to [facility] on:  3/17/2022    Chief Complaint   Patient presents with    Shortness of Breath     Pt brought to ED via NOHD from home on CPAP for SOB that started a few hours ago.      The patient location is: 4/4 A   Admitted 3/13/2022  7:17 PM  Present with the patient at the time of the telemed/virtual assessment: Telepresenter    Interval History / Events Overnight:   The patient is able to provide adequate history. Additional history was obtained from past medical records. No significant events reported by Nursing. BP noted to be intermittently elevated.  Patient complains of dyspnea. Symptoms have improved since yesterday. Associated symptoms include: fatigue. Symptoms are decreasing in severity.     Lab test(s) reviewed: H&H stable    Review of Systems   Constitutional:  Negative for fever.   Respiratory:  Negative for chest tightness.      Objective:     Vital Signs (Most Recent):  Temp: 96 °F (35.6 °C) (03/19/22 0945)  Pulse: 62 (03/19/22 0945)  Resp: 20 (03/19/22 0746)  BP: (!) 181/87 (03/19/22 0945)  SpO2: 95 % (03/19/22 0945)   Vital Signs (24h Range):  Temp:  [96 °F (35.6 °C)-98.8 °F (37.1 °C)] 96 °F (35.6 °C)  Pulse:  [58-74] 62  Resp:  [18-20] 20  SpO2:  [92 %-98 %] 95 %  BP: (131-181)/(77-96) 181/87     Weight: 68.4 kg (150 lb 12.7 oz)  Body mass index is 23.62 kg/m².    Intake/Output Summary (Last 24 hours) at 3/19/2022 1125  Last data filed at 3/19/2022 0000  Gross per 24 hour   Intake --   Output 150 ml   Net -150 ml        Physical Exam  Constitutional:       General: He is not in acute distress.     Appearance: Normal appearance.   Eyes:      General: Lids are normal. No scleral icterus.        Right eye: No discharge.         Left eye: No discharge.      Conjunctiva/sclera: Conjunctivae normal.   Neck:      Trachea: Phonation normal.   Cardiovascular:      Rate and Rhythm: Normal rate.       Comments: Monitor / Vital signs reviewed at time of visit  Pulmonary:      Effort: Pulmonary effort is normal. No tachypnea, accessory muscle usage or respiratory distress.   Abdominal:      General: There is no distension.   Neurological:      Mental Status: He is alert. He is not disoriented.   Psychiatric:         Attention and Perception: Attention normal.         Mood and Affect: Affect normal.         Behavior: Behavior is cooperative.       Significant Labs:    Recent Labs   Lab 02/10/22  0345   HGBA1C 5.7*       Recent Labs   Lab 03/17/22  0538 03/18/22  0520 03/19/22  0303   WBC 9.15 10.22 9.31   HGB 12.1* 11.5* 11.6*   HCT 39.9* 37.4* 38.6*    253 270       Recent Labs   Lab 03/17/22  0538 03/18/22  0520 03/19/22  0303   GRAN 75.0*  6.9 78.1*  8.0* 83.3*  7.8*   LYMPH 16.7*  1.5 13.3*  1.4 11.5*  1.1   MONO 7.4  0.7 7.5  0.8 4.5  0.4   EOS 0.0 0.0 0.0       Recent Labs   Lab 03/17/22  0538 03/18/22  0520 03/19/22  0303    141 139   K 4.1 4.1 4.6   CL 97 100 102   CO2 33* 35* 32*   BUN 16 16 16   CREATININE 0.8 0.8 0.8   GLU 99 128* 74   CALCIUM 9.0 9.2 9.1   ALBUMIN 2.6* 2.6* 2.7*   MG 1.9 2.0 1.9   PHOS 3.3 2.5* 3.3       Recent Labs   Lab 03/14/22  0552 03/15/22  0419 03/17/22  0538 03/18/22  0520 03/19/22  0303   ALKPHOS 82   < > 68 62 62   ALT 30   < > 29 25 26   AST 19   < > 15 11 12   PROT 6.1   < > 5.8* 5.6* 5.8*   BILITOT 0.3   < > 0.2 0.2 0.2   INR 1.0  --   --   --   --     < > = values in this interval not displayed.       Recent Labs   Lab 03/13/22 1924 03/13/22 2020   TROPONINI  --  0.025   *  --        Recent Labs   Lab 12/21/21  1849 01/28/22  0514 03/06/22  0348   PROCAL  --   --  0.10   DDIMER 0.91* 0.67*  --        SARS-CoV2 (COVID-19) Qualitative PCR (no units)   Date Value   08/19/2021 Not Detected   08/05/2021 Detected (A)   07/29/2021 Not Detected   07/22/2021 Not Detected   06/24/2021 Not Detected   06/17/2021 Not Detected   04/24/2021 Not Detected    04/23/2020 Not Detected   04/21/2020 Not Detected     SARS-CoV-2 RNA, Amplification, Qual (no units)   Date Value   06/10/2021 Negative   07/20/2020 Negative   06/25/2020 Negative   06/04/2020 Negative   05/23/2020 Negative   05/12/2020 Negative   05/06/2020 Negative   04/19/2020 Negative   04/03/2020 Negative     POC Rapid COVID (no units)   Date Value   03/13/2022 Negative   03/05/2022 Negative   02/09/2022 Negative   02/07/2022 Negative   01/28/2022 Negative   01/15/2022 Negative   01/01/2022 Negative   12/21/2021 Negative   06/07/2021 Negative   06/03/2021 Negative       ECG Results              EKG 12-lead (Final result)  Result time 03/14/22 16:40:40      Final result by Interface, Lab In [facility] (03/14/22 16:40:40)                   Narrative:    Test Reason : R06.02,    Vent. Rate : 102 BPM     Atrial Rate : 102 BPM     P-R Int : 160 ms          QRS Dur : 086 ms      QT Int : 354 ms       P-R-T Axes : 083 079 085 degrees     QTc Int : 461 ms    Sinus tachycardia  Right atrial enlargement  Borderline Abnormal ECG  When compared with ECG of 05-MAR-2022 15:30,  No significant change was found  Confirmed by Bryan DOBBINS MD ([id number]) on 3/14/2022 3:41:33 PM    Referred By: [email or web address]   SELF           Confirmed By:Bryan DOBBINS MD                                    Results for orders placed during the hospital encounter of 01/28/22    Echo    Interpretation Summary  · The left ventricle is normal in size with normal systolic function.  · Mild left atrial enlargement.  · There is pulmonary hypertension.  · The estimated ejection fraction is 55%.  · Grade I left ventricular diastolic dysfunction.  · Study quality was technically difficult  · Normal right ventricular size with normal right ventricular systolic function.  · Normal central venous pressure (3 mmHg).  · The estimated PA systolic pressure is 50 mmHg.  · Interatrial septal aneurysm  · Mild tricuspid regurgitation.      X-Ray Chest AP Portable  Narrative:  EXAMINATION:  XR CHEST AP PORTABLE    CLINICAL HISTORY:  Shortness of breath    TECHNIQUE:  Single frontal view of the chest was performed.    COMPARISON:  Chest radiograph 03/05/2022    FINDINGS:  Monitoring leads and tubing overlie the chest.    Cardiomediastinal silhouette is midline and stable.  Pulmonary vasculature and hilar contours are unchanged.  Few scattered linear opacities throughout the lungs consistent with minimal platelike scarring versus atelectasis.  Bilateral diffuse nonspecific interstitial coarsening with a perihilar and upper lobe predominance slightly more so on the left, with central peribronchial cuffing and overall increased from 03/05/2022 study.  The lungs are otherwise symmetrically hyperexpanded without large consolidation, pleural effusion or pneumothorax.  No acute osseous process seen.  Impression: Left greater than right perihilar and upper lung zone predominant interval increased nonspecific interstitial coarsening which can be seen with worsening interstitial type pneumonia from inflammatory or infectious process including atypical bacterial or viral etiology versus more atypical distribution of pulmonary edema.  No large consolidation.    Electronically signed by: Mckinley Talbert MD  Date:    03/13/2022  Time:    19:56      Labs and Imaging within the last 24 hours listed above were reviewed.       Diet: Diet Cardiac [facility]  Diet Cardiac  Significant LDAs:   IV Access Type: Peripheral  Urinary Catheter Indication if present: Patient Does Not Have Urinary Catheter  Other Lines/Tubes/Drains:    HIGH RISK CONDITION(S):   Patient has a condition that poses threat to life and bodily function: Severe Respiratory Distress     Goals of Care:    Previous admission:  3/5/22  Likely prognosis:  Fair  Code Status: Full Code  Comfort Only: No  Hospice: No  Goals at discharge: remain at home, with physician follow-up    Discharge Planning   RUTH: 3/21/2022     Code Status: Full Code    Is the patient medically ready for discharge?: Yes    Reason for patient still in hospital (select all that apply): Patient trending condition and Other (specify) awaiting BiPAP  Discharge Plan A: Home

## 2022-03-20 LAB
ALBUMIN SERPL BCP-MCNC: 2.7 G/DL (ref 3.5–5.2)
ALP SERPL-CCNC: 76 U/L (ref 55–135)
ALT SERPL W/O P-5'-P-CCNC: 24 U/L (ref 10–44)
ANION GAP SERPL CALC-SCNC: 6 MMOL/L (ref 8–16)
AST SERPL-CCNC: 12 U/L (ref 10–40)
BASOPHILS # BLD AUTO: 0.01 K/UL (ref 0–0.2)
BASOPHILS NFR BLD: 0.1 % (ref 0–1.9)
BILIRUB SERPL-MCNC: 0.4 MG/DL (ref 0.1–1)
BUN SERPL-MCNC: 18 MG/DL (ref 6–20)
CALCIUM SERPL-MCNC: 8.8 MG/DL (ref 8.7–10.5)
CHLORIDE SERPL-SCNC: 102 MMOL/L (ref 95–110)
CO2 SERPL-SCNC: 31 MMOL/L (ref 23–29)
CREAT SERPL-MCNC: 0.9 MG/DL (ref 0.5–1.4)
DIFFERENTIAL METHOD: ABNORMAL
EOSINOPHIL # BLD AUTO: 0.2 K/UL (ref 0–0.5)
EOSINOPHIL NFR BLD: 2.2 % (ref 0–8)
ERYTHROCYTE [DISTWIDTH] IN BLOOD BY AUTOMATED COUNT: 18.7 % (ref 11.5–14.5)
EST. GFR  (AFRICAN AMERICAN): >60 ML/MIN/1.73 M^2
EST. GFR  (NON AFRICAN AMERICAN): >60 ML/MIN/1.73 M^2
GLUCOSE SERPL-MCNC: 96 MG/DL (ref 70–110)
HCT VFR BLD AUTO: 39.6 % (ref 40–54)
HGB BLD-MCNC: 11.9 G/DL (ref 14–18)
IMM GRANULOCYTES # BLD AUTO: 0.06 K/UL (ref 0–0.04)
IMM GRANULOCYTES NFR BLD AUTO: 0.7 % (ref 0–0.5)
LYMPHOCYTES # BLD AUTO: 3 K/UL (ref 1–4.8)
LYMPHOCYTES NFR BLD: 34.9 % (ref 18–48)
MAGNESIUM SERPL-MCNC: 1.9 MG/DL (ref 1.6–2.6)
MCH RBC QN AUTO: 25.8 PG (ref 27–31)
MCHC RBC AUTO-ENTMCNC: 30.1 G/DL (ref 32–36)
MCV RBC AUTO: 86 FL (ref 82–98)
MONOCYTES # BLD AUTO: 0.8 K/UL (ref 0.3–1)
MONOCYTES NFR BLD: 8.7 % (ref 4–15)
NEUTROPHILS # BLD AUTO: 4.6 K/UL (ref 1.8–7.7)
NEUTROPHILS NFR BLD: 53.4 % (ref 38–73)
NRBC BLD-RTO: 0 /100 WBC
PHOSPHATE SERPL-MCNC: 3 MG/DL (ref 2.7–4.5)
PLATELET # BLD AUTO: 234 K/UL (ref 150–450)
PMV BLD AUTO: 10.8 FL (ref 9.2–12.9)
POTASSIUM SERPL-SCNC: 4.3 MMOL/L (ref 3.5–5.1)
PROT SERPL-MCNC: 5.7 G/DL (ref 6–8.4)
RBC # BLD AUTO: 4.61 M/UL (ref 4.6–6.2)
SODIUM SERPL-SCNC: 139 MMOL/L (ref 136–145)
WBC # BLD AUTO: 8.67 K/UL (ref 3.9–12.7)

## 2022-03-20 PROCEDURE — 25000003 PHARM REV CODE 250: Performed by: STUDENT IN AN ORGANIZED HEALTH CARE EDUCATION/TRAINING PROGRAM

## 2022-03-20 PROCEDURE — 25000003 PHARM REV CODE 250: Performed by: INTERNAL MEDICINE

## 2022-03-20 PROCEDURE — 80053 COMPREHEN METABOLIC PANEL: CPT | Performed by: PHYSICIAN ASSISTANT

## 2022-03-20 PROCEDURE — 94660 CPAP INITIATION&MGMT: CPT

## 2022-03-20 PROCEDURE — S4991 NICOTINE PATCH NONLEGEND: HCPCS | Performed by: INTERNAL MEDICINE

## 2022-03-20 PROCEDURE — 25000242 PHARM REV CODE 250 ALT 637 W/ HCPCS: Performed by: PHYSICIAN ASSISTANT

## 2022-03-20 PROCEDURE — 25000003 PHARM REV CODE 250: Performed by: NURSE PRACTITIONER

## 2022-03-20 PROCEDURE — 94640 AIRWAY INHALATION TREATMENT: CPT

## 2022-03-20 PROCEDURE — 99232 PR SUBSEQUENT HOSPITAL CARE,LEVL II: ICD-10-PCS | Mod: 95,,, | Performed by: INTERNAL MEDICINE

## 2022-03-20 PROCEDURE — 99900035 HC TECH TIME PER 15 MIN (STAT)

## 2022-03-20 PROCEDURE — 99232 SBSQ HOSP IP/OBS MODERATE 35: CPT | Mod: 95,,, | Performed by: INTERNAL MEDICINE

## 2022-03-20 PROCEDURE — 25000003 PHARM REV CODE 250: Performed by: PHYSICIAN ASSISTANT

## 2022-03-20 PROCEDURE — 36415 COLL VENOUS BLD VENIPUNCTURE: CPT | Performed by: PHYSICIAN ASSISTANT

## 2022-03-20 PROCEDURE — 85025 COMPLETE CBC W/AUTO DIFF WBC: CPT | Performed by: PHYSICIAN ASSISTANT

## 2022-03-20 PROCEDURE — 63600175 PHARM REV CODE 636 W HCPCS: Performed by: PHYSICIAN ASSISTANT

## 2022-03-20 PROCEDURE — 84100 ASSAY OF PHOSPHORUS: CPT | Performed by: PHYSICIAN ASSISTANT

## 2022-03-20 PROCEDURE — 94760 N-INVAS EAR/PLS OXIMETRY 1: CPT

## 2022-03-20 PROCEDURE — 94761 N-INVAS EAR/PLS OXIMETRY MLT: CPT

## 2022-03-20 PROCEDURE — 27000221 HC OXYGEN, UP TO 24 HOURS

## 2022-03-20 PROCEDURE — 11000001 HC ACUTE MED/SURG PRIVATE ROOM

## 2022-03-20 PROCEDURE — 83735 ASSAY OF MAGNESIUM: CPT | Performed by: PHYSICIAN ASSISTANT

## 2022-03-20 RX ORDER — CALCIUM CARBONATE 200(500)MG
500 TABLET,CHEWABLE ORAL 3 TIMES DAILY PRN
Status: DISCONTINUED | OUTPATIENT
Start: 2022-03-20 | End: 2022-03-21

## 2022-03-20 RX ORDER — SUCRALFATE 1 G/1
1 TABLET ORAL
Status: DISCONTINUED | OUTPATIENT
Start: 2022-03-20 | End: 2022-04-01 | Stop reason: HOSPADM

## 2022-03-20 RX ORDER — METOCLOPRAMIDE 5 MG/1
5 TABLET ORAL ONCE
Status: COMPLETED | OUTPATIENT
Start: 2022-03-20 | End: 2022-03-20

## 2022-03-20 RX ORDER — ACETAMINOPHEN 325 MG/1
650 TABLET ORAL EVERY 6 HOURS PRN
Status: DISCONTINUED | OUTPATIENT
Start: 2022-03-20 | End: 2022-04-01 | Stop reason: HOSPADM

## 2022-03-20 RX ADMIN — SUCRALFATE 1 G: 1 TABLET ORAL at 05:03

## 2022-03-20 RX ADMIN — IPRATROPIUM BROMIDE AND ALBUTEROL SULFATE 3 ML: 2.5; .5 SOLUTION RESPIRATORY (INHALATION) at 07:03

## 2022-03-20 RX ADMIN — PANTOPRAZOLE SODIUM 40 MG: 40 TABLET, DELAYED RELEASE ORAL at 08:03

## 2022-03-20 RX ADMIN — IPRATROPIUM BROMIDE AND ALBUTEROL SULFATE 3 ML: 2.5; .5 SOLUTION RESPIRATORY (INHALATION) at 01:03

## 2022-03-20 RX ADMIN — METOCLOPRAMIDE 5 MG: 5 TABLET ORAL at 01:03

## 2022-03-20 RX ADMIN — CARVEDILOL 12.5 MG: 12.5 TABLET, FILM COATED ORAL at 04:03

## 2022-03-20 RX ADMIN — ACETAMINOPHEN 650 MG: 325 TABLET ORAL at 10:03

## 2022-03-20 RX ADMIN — SUCRALFATE 1 G: 1 TABLET ORAL at 09:03

## 2022-03-20 RX ADMIN — ENOXAPARIN SODIUM 40 MG: 40 INJECTION SUBCUTANEOUS at 04:03

## 2022-03-20 RX ADMIN — IPRATROPIUM BROMIDE AND ALBUTEROL SULFATE 3 ML: 2.5; .5 SOLUTION RESPIRATORY (INHALATION) at 02:03

## 2022-03-20 RX ADMIN — SIMETHICONE 80 MG: 80 TABLET, CHEWABLE ORAL at 12:03

## 2022-03-20 RX ADMIN — SIMETHICONE 80 MG: 80 TABLET, CHEWABLE ORAL at 08:03

## 2022-03-20 RX ADMIN — NICOTINE 1 PATCH: 14 PATCH, EXTENDED RELEASE TRANSDERMAL at 08:03

## 2022-03-20 RX ADMIN — LEVOFLOXACIN 750 MG: 500 TABLET, FILM COATED ORAL at 08:03

## 2022-03-20 RX ADMIN — PANTOPRAZOLE SODIUM 40 MG: 40 TABLET, DELAYED RELEASE ORAL at 09:03

## 2022-03-20 RX ADMIN — CARVEDILOL 12.5 MG: 12.5 TABLET, FILM COATED ORAL at 08:03

## 2022-03-20 RX ADMIN — FUROSEMIDE 20 MG: 20 TABLET ORAL at 08:03

## 2022-03-20 NOTE — PROGRESS NOTES
[facility] - [facility] Medicine  Telemedicine Progress Note    Patient Name: Baltazar Mccray  MRN: [id number]  Patient Class: IP- Inpatient   Admission Date: 3/13/2022  Length of Stay: 6 days  Attending Physician: Janice Schultz MD  Primary Care Provider: Jaden    Subjective:     Principal Problem:Acute on chronic respiratory failure with hypercapnia    HPI:  Mei Mccray 61yoM w/PMHx of CHF, COPD, HTN, AFib who presented to [facility] w/ shortness of breath 03/13.  Per EMS, patient had been feeling short of breath all day.  He was found to be hypoxic with increased work of breathing. En route to ED, he received 1 DuoNeb, Solu-Medrol 125mg, magnesium 2 g and 2.5 of Versed for acute agitation.  He was placed on CPAP.     Upon arrival to ED,  patient was tachypneic to 30s and hypertensive 150s/60s. He was transitioned from CPAP to BIPAP. Labs notable for WBC 12.8, Hg 12.5, Bicarb 34, , VBG 7.209/PCO2 104/PO2 27/PHCO3 41/BE 14+- while on Bipap. CXR w/interstitial coarsening- interstitial pneumonia vs.pulm edema. Admitted to MICU due to need for continuous bipap.         Overview/Hospital Course:  ICU Course:  Patient was started on azithro/rocephin for suspected CAP/ treatment for COPD exacerbation. Continued on prednisone 40mg qday. Breathing treatments set at every 6 hours. Patient was weaned to bipap at nighttime and w/naps. On 3L BNC. Most recent VBG w/pH 7.37/pCO2 30/pO2 30/pHCO3 43/BE 18. Deemed stable for transfer to hospital medicine 03/14.    Hospital Medicine Course:  Patient officially transferred to hospital medicine service 03/16. He was doing well- on home oxygen settings. Repeat ECG with normal Qtc. Patient was transitioned to levofloxacin to complete 5d course. Sent script to complete 5d of prednisone total. Outpatient follow-up scheduled w/PCP and pulmonology. Patient was stable for discharge to home 03/16, but had delays in arranging home bipap. Awaiting approval from  insurance company.         Telemedicine  This service was provided by Virtual Visit.    Patient was transferred to [facility] on:  3/17/2022    Chief Complaint   Patient presents with    Shortness of Breath     Pt brought to ED via NOHD from home on CPAP for SOB that started a few hours ago.      The patient location is: 4/4 A   Admitted 3/13/2022  7:17 PM  Present with the patient at the time of the telemed/virtual assessment: Telepresenter    Interval History / Events Overnight:   The patient is able to provide adequate history. Additional history was obtained from past medical records. No significant events reported by Nursing. BP noted to be intermittently elevated.  Patient complains of dyspnea. Symptoms have improved since yesterday. Associated symptoms include: fatigue. Symptoms are decreasing in severity.     Lab test(s) reviewed: H&H stable    Review of Systems   Constitutional:  Negative for fever.   Respiratory:  Negative for chest tightness.      Objective:     Vital Signs (Most Recent):  Temp: 96 °F (35.6 °C) (03/19/22 0945)  Pulse: 62 (03/19/22 0945)  Resp: 20 (03/19/22 0746)  BP: (!) 181/87 (03/19/22 0945)  SpO2: 95 % (03/19/22 0945)   Vital Signs (24h Range):  Temp:  [96 °F (35.6 °C)-98.8 °F (37.1 °C)] 96 °F (35.6 °C)  Pulse:  [58-74] 62  Resp:  [18-20] 20  SpO2:  [92 %-98 %] 95 %  BP: (131-181)/(77-96) 181/87     Weight: 68.4 kg (150 lb 12.7 oz)  Body mass index is 23.62 kg/m².    Intake/Output Summary (Last 24 hours) at 3/19/2022 1125  Last data filed at 3/19/2022 0000  Gross per 24 hour   Intake --   Output 150 ml   Net -150 ml        Physical Exam  Constitutional:       General: He is not in acute distress.     Appearance: Normal appearance.   Eyes:      General: Lids are normal. No scleral icterus.        Right eye: No discharge.         Left eye: No discharge.      Conjunctiva/sclera: Conjunctivae normal.   Neck:      Trachea: Phonation normal.   Cardiovascular:      Rate  and Rhythm: Normal rate.      Comments: Monitor / Vital signs reviewed at time of visit  Pulmonary:      Effort: Pulmonary effort is normal. No tachypnea, accessory muscle usage or respiratory distress.   Abdominal:      General: There is no distension.   Neurological:      Mental Status: He is alert. He is not disoriented.   Psychiatric:         Attention and Perception: Attention normal.         Mood and Affect: Affect normal.         Behavior: Behavior is cooperative.       Significant Labs:    Recent Labs   Lab 02/10/22  0345   HGBA1C 5.7*       Recent Labs   Lab 03/17/22  0538 03/18/22  0520 03/19/22  0303   WBC 9.15 10.22 9.31   HGB 12.1* 11.5* 11.6*   HCT 39.9* 37.4* 38.6*    253 270       Recent Labs   Lab 03/17/22  0538 03/18/22  0520 03/19/22  0303   GRAN 75.0*  6.9 78.1*  8.0* 83.3*  7.8*   LYMPH 16.7*  1.5 13.3*  1.4 11.5*  1.1   MONO 7.4  0.7 7.5  0.8 4.5  0.4   EOS 0.0 0.0 0.0       Recent Labs   Lab 03/17/22  0538 03/18/22  0520 03/19/22  0303    141 139   K 4.1 4.1 4.6   CL 97 100 102   CO2 33* 35* 32*   BUN 16 16 16   CREATININE 0.8 0.8 0.8   GLU 99 128* 74   CALCIUM 9.0 9.2 9.1   ALBUMIN 2.6* 2.6* 2.7*   MG 1.9 2.0 1.9   PHOS 3.3 2.5* 3.3       Recent Labs   Lab 03/14/22  0552 03/15/22  0419 03/17/22  0538 03/18/22  0520 03/19/22  0303   ALKPHOS 82   < > 68 62 62   ALT 30   < > 29 25 26   AST 19   < > 15 11 12   PROT 6.1   < > 5.8* 5.6* 5.8*   BILITOT 0.3   < > 0.2 0.2 0.2   INR 1.0  --   --   --   --     < > = values in this interval not displayed.       Recent Labs   Lab 03/13/22  1924 03/13/22 2020   TROPONINI  --  0.025   *  --        Recent Labs   Lab 12/21/21  1849 01/28/22  0514 03/06/22  0348   PROCAL  --   --  0.10   DDIMER 0.91* 0.67*  --        SARS-CoV2 (COVID-19) Qualitative PCR (no units)   Date Value   08/19/2021 Not Detected   08/05/2021 Detected (A)   07/29/2021 Not Detected   07/22/2021 Not Detected   06/24/2021 Not Detected   06/17/2021 Not Detected    04/24/2021 Not Detected   04/23/2020 Not Detected   04/21/2020 Not Detected     SARS-CoV-2 RNA, Amplification, Qual (no units)   Date Value   06/10/2021 Negative   07/20/2020 Negative   06/25/2020 Negative   06/04/2020 Negative   05/23/2020 Negative   05/12/2020 Negative   05/06/2020 Negative   04/19/2020 Negative   04/03/2020 Negative     POC Rapid COVID (no units)   Date Value   03/13/2022 Negative   03/05/2022 Negative   02/09/2022 Negative   02/07/2022 Negative   01/28/2022 Negative   01/15/2022 Negative   01/01/2022 Negative   12/21/2021 Negative   06/07/2021 Negative   06/03/2021 Negative       ECG Results              EKG 12-lead (Final result)  Result time 03/14/22 16:40:40      Final result by Interface, Lab In [facility] (03/14/22 16:40:40)                   Narrative:    Test Reason : R06.02,    Vent. Rate : 102 BPM     Atrial Rate : 102 BPM     P-R Int : 160 ms          QRS Dur : 086 ms      QT Int : 354 ms       P-R-T Axes : 083 079 085 degrees     QTc Int : 461 ms    Sinus tachycardia  Right atrial enlargement  Borderline Abnormal ECG  When compared with ECG of 05-MAR-2022 15:30,  No significant change was found  Confirmed by Bryan DOBBINS MD ([id number]) on 3/14/2022 3:41:33 PM    Referred By: [email or web address]   SELF           Confirmed By:Bryan DOBBINS MD                                    Results for orders placed during the hospital encounter of 01/28/22    Echo    Interpretation Summary  · The left ventricle is normal in size with normal systolic function.  · Mild left atrial enlargement.  · There is pulmonary hypertension.  · The estimated ejection fraction is 55%.  · Grade I left ventricular diastolic dysfunction.  · Study quality was technically difficult  · Normal right ventricular size with normal right ventricular systolic function.  · Normal central venous pressure (3 mmHg).  · The estimated PA systolic pressure is 50 mmHg.  · Interatrial septal aneurysm  · Mild tricuspid regurgitation.      X-Ray Chest AP  Portable  Narrative: EXAMINATION:  XR CHEST AP PORTABLE    CLINICAL HISTORY:  Shortness of breath    TECHNIQUE:  Single frontal view of the chest was performed.    COMPARISON:  Chest radiograph 03/05/2022    FINDINGS:  Monitoring leads and tubing overlie the chest.    Cardiomediastinal silhouette is midline and stable.  Pulmonary vasculature and hilar contours are unchanged.  Few scattered linear opacities throughout the lungs consistent with minimal platelike scarring versus atelectasis.  Bilateral diffuse nonspecific interstitial coarsening with a perihilar and upper lobe predominance slightly more so on the left, with central peribronchial cuffing and overall increased from 03/05/2022 study.  The lungs are otherwise symmetrically hyperexpanded without large consolidation, pleural effusion or pneumothorax.  No acute osseous process seen.  Impression: Left greater than right perihilar and upper lung zone predominant interval increased nonspecific interstitial coarsening which can be seen with worsening interstitial type pneumonia from inflammatory or infectious process including atypical bacterial or viral etiology versus more atypical distribution of pulmonary edema.  No large consolidation.    Electronically signed by: Mckinley Talbert MD  Date:    03/13/2022  Time:    19:56      Labs and Imaging within the last 24 hours listed above were reviewed.       Diet: Diet Cardiac [facility]  Diet Cardiac  Significant LDAs:   IV Access Type: Peripheral  Urinary Catheter Indication if present: Patient Does Not Have Urinary Catheter  Other Lines/Tubes/Drains:    HIGH RISK CONDITION(S):   Patient has a condition that poses threat to life and bodily function: Severe Respiratory Distress     Goals of Care:    Previous admission:  3/5/22  Likely prognosis:  Fair  Code Status: Full Code  Comfort Only: No  Hospice: No  Goals at discharge: remain at home, with physician follow-up    Discharge Planning   RUTH: 3/21/2022     Code  Status: Full Code   Is the patient medically ready for discharge?: Yes    Reason for patient still in hospital (select all that apply): Patient trending condition and Other (specify) awaiting BiPAP  Discharge Plan A: Home        Assessment/Plan:      * Acute on chronic respiratory failure with hypercapnia  Uses home O2 at 2 - 3 LPM  Needs BiPAP - awaiting Medicaid approval    BiPAP (biphasic positive airway pressure) dependence  Awaiting insurance approval for BiPAP    Chronic diastolic heart failure  Control BP; continuing home medications  Monitor    Chronic obstructive pulmonary disease  Patient having difficulty using his inhalers - PharmD assistance appreciated.    Tobacco abuse  - Cessation counseling  - Nicotine patch     Essential hypertension  - Continue home Coreg and Lasix    COPD exacerbation  Wean O2 as tolerated to keep O2 saturations >88%  -  Repeat ECG without prolonged QTc- stopped Rocephin and azithro and transition to levaquin   - Continue prednisone  - Scheduled duonebs q 6 hrs        Active Hospital Problems    Diagnosis  POA    *Acute on chronic respiratory failure with hypercapnia [J96.22]  Yes    BiPAP (biphasic positive airway pressure) dependence [Z99.89]  Not Applicable    Chronic diastolic heart failure [I50.32]  Yes    Chronic obstructive pulmonary disease [J44.9]  Yes     Chronic     Per my interpretation spirometry shows significant obstruction-FEV1 28% of predicted, moderate restriction and significantly decreased DLCO.     MMRC 3-4 symptoms of dyspnea-has home oxygen  Greater than 10 COPD exacerbations within 1 year;  Had recent exacerbation few weeks ago.  Prescribe prednisone and antibiotics seems to be better.  Concerns for noncompliance with inhaler regimen  Approximately to hospitalizations for COPD within the last 1 year  Notes to have a chronic cough  Significant smoking history  History of crack cocaine use quit 20 years  History of working at  [employer] -in the 90s  exposed to black sand  Now reporting better living arrangements at Nursing home.       Has failed previous inhaler therapy (advair and Spiriva)   Also, discussed pulmonary rehabilitation   May be considered for advanced lung follow-up- he patient is 61 years old.    Explains he needs to remain smoke free for approximately 6 months    -Encouraged complete smoking cessation. I have referred you to the smoking cessation program.   -Start Breztri. Rinse mouth after use.   -Referal to pulm rehab placed- patient interested.   -Continue albuterol 2 puffs every 4-6 hours as needed for shortness of breath or wheezing.   - Please obtain CT of chest in relation to previous abnormal CT - for follow up. Would recommend annual low dose CT screening in relation to your smoking history.   -Please make sure pneumonia vaccine is up to date. Thinks could have obtained the pneumonia vaccine. Would like to follow up with daughters of adelita and [employer].   -Encourage COVID vaccine.   -Continue home oxygen as prescribed.   -Follow up in 8 weeks or sooner if needed.       Tobacco abuse [Z72.0]  Yes     Chronic    Essential hypertension [I10]  Yes     Chronic    COPD exacerbation [J44.1]  Yes      Resolved Hospital Problems   No resolved problems to display.       Inpatient Medications Prescribed for Management of Current Problems:     Scheduled Meds:    albuterol-ipratropium  3 mL Nebulization Q6H    carvediloL  12.5 mg Oral BID WM    enoxaparin  40 mg Subcutaneous Daily    furosemide  20 mg Oral Daily    levoFLOXacin  750 mg Oral Daily    mupirocin   Nasal BID    nicotine  1 patch Transdermal Daily    pantoprazole  40 mg Oral BID     Continuous Infusions:   As Needed: cloNIDine, hydrALAZINE, influenza, simethicone, sodium chloride 0.9%    VTE Risk Mitigation (From admission, onward)         Ordered     enoxaparin injection 40 mg  Daily         03/13/22 2246     IP VTE HIGH RISK PATIENT  Once         03/13/22 2246     Place  sequential compression device  Until discontinued         03/13/22 3501              I have assessed these finding virtually using telemed platform and with assistance of bedside nurse     The attending portion of this evaluation, treatment, and documentation was performed per Janice Schultz MD via Telemedicine AudioVisual using the secure [employer] software platform with 2 way audio/video. The provider was located off-site and the patient is located in the hospital. The aforementioned video software was utilized to document the relevant history and physical exam    Janice Schultz MD  Department of Hospital Medicine   [facility]

## 2022-03-20 NOTE — PLAN OF CARE
Problem: Adult Inpatient Plan of Care  Goal: Plan of Care Review  Outcome: Ongoing, Progressing  Goal: Patient-Specific Goal (Individualized)  Outcome: Ongoing, Progressing  Goal: Absence of Hospital-Acquired Illness or Injury  Outcome: Ongoing, Progressing  Goal: Optimal Comfort and Wellbeing  Outcome: Ongoing, Progressing  Goal: Readiness for Transition of Care  Outcome: Ongoing, Progressing     Patient lying in bed complaining of upset stomach, M.D. notified and she ordered reglan tab. The patient complain again about having a upset stomach at 5 p.m. the doctor then ordered sucralfate 1g. Patient resting with call bell within reach.

## 2022-03-20 NOTE — PLAN OF CARE
PT is AAOx4. Medication given, BIPAP on and connected to pt. No C/O pain or discomfort noted. No acute changes from previous shift. Safety maintained will continue to monitor patient.

## 2022-03-20 NOTE — SUBJECTIVE & OBJECTIVE
Telemedicine  This service was provided by Virtual Visit.    Patient was transferred to [facility] on:  3/17/2022    Chief Complaint   Patient presents with    Shortness of Breath     Pt brought to ED via NOHD from home on CPAP for SOB that started a few hours ago.      The patient location is: 4/4 A   Admitted 3/13/2022  7:17 PM  Present with the patient at the time of the telemed/virtual assessment: Telepresenter    Interval History / Events Overnight:   The patient is able to provide adequate history. Additional history was obtained from past medical records. No significant events reported by Nursing.   Patient complains of post-prandial abdominal discomfort. Symptoms have been unchanged since yesterday. Associated symptoms include: fatigue. Symptoms are stable.     Lab test(s) reviewed: H&H stable    Review of Systems   Constitutional:  Negative for fever.   Respiratory:  Negative for chest tightness.      Objective:     Vital Signs (Most Recent):  Temp: 97.8 °F (36.6 °C) (03/20/22 0836)  Pulse: 74 (03/20/22 1107)  Resp: 16 (03/20/22 0706)  BP: 139/77 (03/20/22 0836)  SpO2: (!) 93 % (03/20/22 0836)   Vital Signs (24h Range):  Temp:  [96.9 °F (36.1 °C)-98 °F (36.7 °C)] 97.8 °F (36.6 °C)  Pulse:  [65-76] 74  Resp:  [16-20] 16  SpO2:  [90 %-98 %] 93 %  BP: (110-150)/(64-83) 139/77     Weight: 68.4 kg (150 lb 12.7 oz)  Body mass index is 23.62 kg/m².    Intake/Output Summary (Last 24 hours) at 3/20/2022 1213  Last data filed at 3/20/2022 0400  Gross per 24 hour   Intake --   Output 200 ml   Net -200 ml        Physical Exam  Constitutional:       General: He is not in acute distress.     Appearance: Normal appearance.   Eyes:      General: Lids are normal. No scleral icterus.        Right eye: No discharge.         Left eye: No discharge.      Conjunctiva/sclera: Conjunctivae normal.   Neck:      Trachea: Phonation normal.   Cardiovascular:      Rate and Rhythm: Normal rate.      Comments: Monitor  / Vital signs reviewed at time of visit  Pulmonary:      Effort: Pulmonary effort is normal. No tachypnea, accessory muscle usage or respiratory distress.   Abdominal:      General: There is no distension.   Neurological:      Mental Status: He is alert. He is not disoriented.   Psychiatric:         Attention and Perception: Attention normal.         Mood and Affect: Affect normal.         Behavior: Behavior is cooperative.       Significant Labs:    Recent Labs   Lab 02/10/22  0345   HGBA1C 5.7*       Recent Labs   Lab 03/18/22  0520 03/19/22  0303 03/20/22  0445   WBC 10.22 9.31 8.67   HGB 11.5* 11.6* 11.9*   HCT 37.4* 38.6* 39.6*    270 234       Recent Labs   Lab 03/18/22  0520 03/19/22  0303 03/20/22  0445   GRAN 78.1*  8.0* 83.3*  7.8* 53.4  4.6   LYMPH 13.3*  1.4 11.5*  1.1 34.9  3.0   MONO 7.5  0.8 4.5  0.4 8.7  0.8   EOS 0.0 0.0 0.2       Recent Labs   Lab 03/18/22  0520 03/19/22  0303 03/20/22  0445    139 139   K 4.1 4.6 4.3    102 102   CO2 35* 32* 31*   BUN 16 16 18   CREATININE 0.8 0.8 0.9   * 74 96   CALCIUM 9.2 9.1 8.8   ALBUMIN 2.6* 2.7* 2.7*   MG 2.0 1.9 1.9   PHOS 2.5* 3.3 3.0       Recent Labs   Lab 03/14/22  0552 03/15/22  0419 03/18/22  0520 03/19/22  0303 03/20/22  0445   ALKPHOS 82   < > 62 62 76   ALT 30   < > 25 26 24   AST 19   < > 11 12 12   PROT 6.1   < > 5.6* 5.8* 5.7*   BILITOT 0.3   < > 0.2 0.2 0.4   INR 1.0  --   --   --   --     < > = values in this interval not displayed.       Recent Labs   Lab 03/13/22 1924 03/13/22 2020   TROPONINI  --  0.025   *  --        Recent Labs   Lab 12/21/21  1849 01/28/22  0514 03/06/22  0348   PROCAL  --   --  0.10   DDIMER 0.91* 0.67*  --        SARS-CoV2 (COVID-19) Qualitative PCR (no units)   Date Value   08/19/2021 Not Detected   08/05/2021 Detected (A)   07/29/2021 Not Detected   07/22/2021 Not Detected   06/24/2021 Not Detected   06/17/2021 Not Detected   04/24/2021 Not Detected   04/23/2020 Not  Detected   04/21/2020 Not Detected     SARS-CoV-2 RNA, Amplification, Qual (no units)   Date Value   06/10/2021 Negative   07/20/2020 Negative   06/25/2020 Negative   06/04/2020 Negative   05/23/2020 Negative   05/12/2020 Negative   05/06/2020 Negative   04/19/2020 Negative   04/03/2020 Negative     POC Rapid COVID (no units)   Date Value   03/13/2022 Negative   03/05/2022 Negative   02/09/2022 Negative   02/07/2022 Negative   01/28/2022 Negative   01/15/2022 Negative   01/01/2022 Negative   12/21/2021 Negative   06/07/2021 Negative   06/03/2021 Negative       ECG Results              EKG 12-lead (Final result)  Result time 03/14/22 16:40:40      Final result by Interface, Lab In [facility] (03/14/22 16:40:40)                   Narrative:    Test Reason : R06.02,    Vent. Rate : 102 BPM     Atrial Rate : 102 BPM     P-R Int : 160 ms          QRS Dur : 086 ms      QT Int : 354 ms       P-R-T Axes : 083 079 085 degrees     QTc Int : 461 ms    Sinus tachycardia  Right atrial enlargement  Borderline Abnormal ECG  When compared with ECG of 05-MAR-2022 15:30,  No significant change was found  Confirmed by Bryan DOBBINS MD ([id number]) on 3/14/2022 3:41:33 PM    Referred By: [email or web address]   SELF           Confirmed By:Bryan DOBBINS MD                                    Results for orders placed during the hospital encounter of 01/28/22    Echo    Interpretation Summary  · The left ventricle is normal in size with normal systolic function.  · Mild left atrial enlargement.  · There is pulmonary hypertension.  · The estimated ejection fraction is 55%.  · Grade I left ventricular diastolic dysfunction.  · Study quality was technically difficult  · Normal right ventricular size with normal right ventricular systolic function.  · Normal central venous pressure (3 mmHg).  · The estimated PA systolic pressure is 50 mmHg.  · Interatrial septal aneurysm  · Mild tricuspid regurgitation.      X-Ray Chest AP Portable  Narrative: EXAMINATION:  XR CHEST AP  PORTABLE    CLINICAL HISTORY:  Shortness of breath    TECHNIQUE:  Single frontal view of the chest was performed.    COMPARISON:  Chest radiograph 03/05/2022    FINDINGS:  Monitoring leads and tubing overlie the chest.    Cardiomediastinal silhouette is midline and stable.  Pulmonary vasculature and hilar contours are unchanged.  Few scattered linear opacities throughout the lungs consistent with minimal platelike scarring versus atelectasis.  Bilateral diffuse nonspecific interstitial coarsening with a perihilar and upper lobe predominance slightly more so on the left, with central peribronchial cuffing and overall increased from 03/05/2022 study.  The lungs are otherwise symmetrically hyperexpanded without large consolidation, pleural effusion or pneumothorax.  No acute osseous process seen.  Impression: Left greater than right perihilar and upper lung zone predominant interval increased nonspecific interstitial coarsening which can be seen with worsening interstitial type pneumonia from inflammatory or infectious process including atypical bacterial or viral etiology versus more atypical distribution of pulmonary edema.  No large consolidation.    Electronically signed by: Mckinley Talbert MD  Date:    03/13/2022  Time:    19:56      Labs and Imaging within the last 24 hours listed above were reviewed.       Diet: Diet Cardiac [facility]  Diet Cardiac  Significant LDAs:   IV Access Type: Peripheral  Urinary Catheter Indication if present: Patient Does Not Have Urinary Catheter  Other Lines/Tubes/Drains:    HIGH RISK CONDITION(S):   Patient has a condition that poses threat to life and bodily function: Severe Respiratory Distress     Goals of Care:    Previous admission:  3/5/22  Likely prognosis:  Fair  Code Status: Full Code  Comfort Only: No  Hospice: No  Goals at discharge: remain at home, with physician follow-up    Discharge Planning   RUTH: 3/21/2022     Code Status: Full Code   Is the patient medically  ready for discharge?: Yes    Reason for patient still in hospital (select all that apply): Patient trending condition and Other (specify) awaiting BiPAP  Discharge Plan A: Home

## 2022-03-21 ENCOUNTER — TELEPHONE (OUTPATIENT)
Dept: PHARMACY | Facility: CLINIC | Age: 62
End: 2022-03-21
Payer: MEDICAID

## 2022-03-21 LAB
ALBUMIN SERPL BCP-MCNC: 2.8 G/DL (ref 3.5–5.2)
ALP SERPL-CCNC: 69 U/L (ref 55–135)
ALT SERPL W/O P-5'-P-CCNC: 23 U/L (ref 10–44)
ANION GAP SERPL CALC-SCNC: 9 MMOL/L (ref 8–16)
AST SERPL-CCNC: 11 U/L (ref 10–40)
BASOPHILS # BLD AUTO: 0.03 K/UL (ref 0–0.2)
BASOPHILS NFR BLD: 0.3 % (ref 0–1.9)
BILIRUB SERPL-MCNC: 0.6 MG/DL (ref 0.1–1)
BUN SERPL-MCNC: 20 MG/DL (ref 6–20)
CALCIUM SERPL-MCNC: 9.1 MG/DL (ref 8.7–10.5)
CHLORIDE SERPL-SCNC: 98 MMOL/L (ref 95–110)
CO2 SERPL-SCNC: 33 MMOL/L (ref 23–29)
CREAT SERPL-MCNC: 1.1 MG/DL (ref 0.5–1.4)
DIFFERENTIAL METHOD: ABNORMAL
EOSINOPHIL # BLD AUTO: 0.3 K/UL (ref 0–0.5)
EOSINOPHIL NFR BLD: 3.3 % (ref 0–8)
ERYTHROCYTE [DISTWIDTH] IN BLOOD BY AUTOMATED COUNT: 18.4 % (ref 11.5–14.5)
EST. GFR  (AFRICAN AMERICAN): >60 ML/MIN/1.73 M^2
EST. GFR  (NON AFRICAN AMERICAN): >60 ML/MIN/1.73 M^2
GLUCOSE SERPL-MCNC: 83 MG/DL (ref 70–110)
HCT VFR BLD AUTO: 41.2 % (ref 40–54)
HGB BLD-MCNC: 12.7 G/DL (ref 14–18)
IMM GRANULOCYTES # BLD AUTO: 0.11 K/UL (ref 0–0.04)
IMM GRANULOCYTES NFR BLD AUTO: 1.3 % (ref 0–0.5)
LYMPHOCYTES # BLD AUTO: 2.7 K/UL (ref 1–4.8)
LYMPHOCYTES NFR BLD: 31.1 % (ref 18–48)
MAGNESIUM SERPL-MCNC: 1.9 MG/DL (ref 1.6–2.6)
MCH RBC QN AUTO: 25.7 PG (ref 27–31)
MCHC RBC AUTO-ENTMCNC: 30.8 G/DL (ref 32–36)
MCV RBC AUTO: 83 FL (ref 82–98)
MONOCYTES # BLD AUTO: 0.9 K/UL (ref 0.3–1)
MONOCYTES NFR BLD: 9.8 % (ref 4–15)
NEUTROPHILS # BLD AUTO: 4.8 K/UL (ref 1.8–7.7)
NEUTROPHILS NFR BLD: 54.2 % (ref 38–73)
NRBC BLD-RTO: 0 /100 WBC
PHOSPHATE SERPL-MCNC: 3.2 MG/DL (ref 2.7–4.5)
PLATELET # BLD AUTO: 260 K/UL (ref 150–450)
PMV BLD AUTO: 10.5 FL (ref 9.2–12.9)
POTASSIUM SERPL-SCNC: 4.3 MMOL/L (ref 3.5–5.1)
PROT SERPL-MCNC: 6.2 G/DL (ref 6–8.4)
RBC # BLD AUTO: 4.95 M/UL (ref 4.6–6.2)
SODIUM SERPL-SCNC: 140 MMOL/L (ref 136–145)
WBC # BLD AUTO: 8.78 K/UL (ref 3.9–12.7)

## 2022-03-21 PROCEDURE — 25000003 PHARM REV CODE 250: Performed by: NURSE PRACTITIONER

## 2022-03-21 PROCEDURE — 84100 ASSAY OF PHOSPHORUS: CPT | Performed by: PHYSICIAN ASSISTANT

## 2022-03-21 PROCEDURE — 25000003 PHARM REV CODE 250: Performed by: INTERNAL MEDICINE

## 2022-03-21 PROCEDURE — 11000001 HC ACUTE MED/SURG PRIVATE ROOM

## 2022-03-21 PROCEDURE — 83735 ASSAY OF MAGNESIUM: CPT | Performed by: PHYSICIAN ASSISTANT

## 2022-03-21 PROCEDURE — 85025 COMPLETE CBC W/AUTO DIFF WBC: CPT | Performed by: PHYSICIAN ASSISTANT

## 2022-03-21 PROCEDURE — S4991 NICOTINE PATCH NONLEGEND: HCPCS | Performed by: INTERNAL MEDICINE

## 2022-03-21 PROCEDURE — 63600175 PHARM REV CODE 636 W HCPCS: Performed by: PHYSICIAN ASSISTANT

## 2022-03-21 PROCEDURE — 94640 AIRWAY INHALATION TREATMENT: CPT

## 2022-03-21 PROCEDURE — 94761 N-INVAS EAR/PLS OXIMETRY MLT: CPT

## 2022-03-21 PROCEDURE — 94660 CPAP INITIATION&MGMT: CPT

## 2022-03-21 PROCEDURE — 80053 COMPREHEN METABOLIC PANEL: CPT | Performed by: PHYSICIAN ASSISTANT

## 2022-03-21 PROCEDURE — 99900035 HC TECH TIME PER 15 MIN (STAT)

## 2022-03-21 PROCEDURE — 25000003 PHARM REV CODE 250: Performed by: PHYSICIAN ASSISTANT

## 2022-03-21 PROCEDURE — 99232 SBSQ HOSP IP/OBS MODERATE 35: CPT | Mod: 95,,, | Performed by: INTERNAL MEDICINE

## 2022-03-21 PROCEDURE — 25000242 PHARM REV CODE 250 ALT 637 W/ HCPCS: Performed by: PHYSICIAN ASSISTANT

## 2022-03-21 PROCEDURE — 36415 COLL VENOUS BLD VENIPUNCTURE: CPT | Performed by: PHYSICIAN ASSISTANT

## 2022-03-21 PROCEDURE — 27000221 HC OXYGEN, UP TO 24 HOURS

## 2022-03-21 PROCEDURE — 99232 PR SUBSEQUENT HOSPITAL CARE,LEVL II: ICD-10-PCS | Mod: 95,,, | Performed by: INTERNAL MEDICINE

## 2022-03-21 RX ORDER — CALCIUM CARBONATE 200(500)MG
1000 TABLET,CHEWABLE ORAL 3 TIMES DAILY PRN
Status: DISCONTINUED | OUTPATIENT
Start: 2022-03-21 | End: 2022-04-01 | Stop reason: HOSPADM

## 2022-03-21 RX ORDER — MAG HYDROX/ALUMINUM HYD/SIMETH 200-200-20
30 SUSPENSION, ORAL (FINAL DOSE FORM) ORAL ONCE AS NEEDED
Status: COMPLETED | OUTPATIENT
Start: 2022-03-21 | End: 2022-03-22

## 2022-03-21 RX ORDER — LIDOCAINE HYDROCHLORIDE 20 MG/ML
10 SOLUTION OROPHARYNGEAL ONCE AS NEEDED
Status: COMPLETED | OUTPATIENT
Start: 2022-03-21 | End: 2022-03-22

## 2022-03-21 RX ADMIN — SUCRALFATE 1 G: 1 TABLET ORAL at 10:03

## 2022-03-21 RX ADMIN — SUCRALFATE 1 G: 1 TABLET ORAL at 08:03

## 2022-03-21 RX ADMIN — CALCIUM CARBONATE (ANTACID) CHEW TAB 500 MG 1000 MG: 500 CHEW TAB at 02:03

## 2022-03-21 RX ADMIN — CALCIUM CARBONATE (ANTACID) CHEW TAB 500 MG 500 MG: 500 CHEW TAB at 08:03

## 2022-03-21 RX ADMIN — NICOTINE 1 PATCH: 14 PATCH, EXTENDED RELEASE TRANSDERMAL at 08:03

## 2022-03-21 RX ADMIN — SUCRALFATE 1 G: 1 TABLET ORAL at 04:03

## 2022-03-21 RX ADMIN — IPRATROPIUM BROMIDE AND ALBUTEROL SULFATE 3 ML: 2.5; .5 SOLUTION RESPIRATORY (INHALATION) at 01:03

## 2022-03-21 RX ADMIN — ENOXAPARIN SODIUM 40 MG: 40 INJECTION SUBCUTANEOUS at 04:03

## 2022-03-21 RX ADMIN — FUROSEMIDE 20 MG: 20 TABLET ORAL at 08:03

## 2022-03-21 RX ADMIN — CARVEDILOL 12.5 MG: 12.5 TABLET, FILM COATED ORAL at 04:03

## 2022-03-21 RX ADMIN — CARVEDILOL 12.5 MG: 12.5 TABLET, FILM COATED ORAL at 08:03

## 2022-03-21 RX ADMIN — IPRATROPIUM BROMIDE AND ALBUTEROL SULFATE 3 ML: 2.5; .5 SOLUTION RESPIRATORY (INHALATION) at 07:03

## 2022-03-21 RX ADMIN — PANTOPRAZOLE SODIUM 40 MG: 40 TABLET, DELAYED RELEASE ORAL at 08:03

## 2022-03-21 RX ADMIN — IPRATROPIUM BROMIDE AND ALBUTEROL SULFATE 3 ML: 2.5; .5 SOLUTION RESPIRATORY (INHALATION) at 08:03

## 2022-03-21 RX ADMIN — IPRATROPIUM BROMIDE AND ALBUTEROL SULFATE 3 ML: 2.5; .5 SOLUTION RESPIRATORY (INHALATION) at 12:03

## 2022-03-21 RX ADMIN — SUCRALFATE 1 G: 1 TABLET ORAL at 06:03

## 2022-03-21 RX ADMIN — ACETAMINOPHEN 650 MG: 325 TABLET ORAL at 11:03

## 2022-03-21 NOTE — PLAN OF CARE
Call placed to [facility] in [place], [place] ([phone]) to discuss patient's admission in 2021. The call went straight to voicemail. This CM left a message requesting a call back. Will continue to follow.    Galina Beasley RN  Ext [phone]

## 2022-03-21 NOTE — PLAN OF CARE
Problem: Adult Inpatient Plan of Care  Goal: Plan of Care Review  Outcome: Ongoing, Progressing  Goal: Patient-Specific Goal (Individualized)  Outcome: Ongoing, Progressing  Goal: Absence of Hospital-Acquired Illness or Injury  Outcome: Ongoing, Progressing  Goal: Optimal Comfort and Wellbeing  Outcome: Ongoing, Progressing  Goal: Readiness for Transition of Care  Outcome: Ongoing, Progressing     Problem: Adult Inpatient Plan of Care  Goal: Patient-Specific Goal (Individualized)  Outcome: Ongoing, Progressing     Problem: Adult Inpatient Plan of Care  Goal: Optimal Comfort and Wellbeing  Outcome: Ongoing, Progressing     Problem: Adult Inpatient Plan of Care  Goal: Readiness for Transition of Care  Outcome: Ongoing, Progressing

## 2022-03-21 NOTE — PLAN OF CARE
Plan of care discussed with patient. Patient is free of fall/trauma/injury. Denies CP, SOB, or pain/discomfort. Pt awaiting insurance approval for home Bi-Pap for DC.  All questions addressed. Will continue to monitor     Problem: Adult Inpatient Plan of Care  Goal: Plan of Care Review  Outcome: Ongoing, Progressing  Goal: Patient-Specific Goal (Individualized)  Outcome: Ongoing, Progressing  Goal: Absence of Hospital-Acquired Illness or Injury  Outcome: Ongoing, Progressing  Goal: Optimal Comfort and Wellbeing  Outcome: Ongoing, Progressing  Goal: Readiness for Transition of Care  Outcome: Ongoing, Progressing

## 2022-03-21 NOTE — PLAN OF CARE
CM notified by Medicaid representative that patient or family member should call the Long Term Medicaid Dept ([phone]) to request an address change. This CM placed a call to patient's sister Viry ([phone]) and notified her of above conversation. Viry expressed understanding and stated that she would call today. Dr. Schultz notified of above conversation, will continue to follow.    16:05PM  Call received from Viry informing this CM that her brother's address has been update in the Medicaid system. Will continue to follow.    Galina Beasley RN  Ext [phone]

## 2022-03-21 NOTE — ASSESSMENT & PLAN NOTE
Wean O2 as tolerated to keep O2 saturations >88%  -  Repeat ECG without prolonged QTc- stopped Rocephin and azithro and transition to levaquin   - Continue prednisone to complete course  - Scheduled duonebs q 6 hrs

## 2022-03-21 NOTE — PROGRESS NOTES
[facility]  Telemedicine Progress Note    Patient Name: Baltazar Mccray  MRN: [id number]  Patient Class: IP- Inpatient   Admission Date: 3/13/2022  Length of Stay: 7 days  Attending Physician: Janice Schultz MD  Primary Care Provider: Jaden    Subjective:     Principal Problem:Acute on chronic respiratory failure with hypercapnia    HPI:  Mei Mccray 61yoM w/PMHx of CHF, COPD, HTN, AFib who presented to [facility] w/ shortness of breath 03/13.  Per EMS, patient had been feeling short of breath all day.  He was found to be hypoxic with increased work of breathing. En route to ED, he received 1 DuoNeb, Solu-Medrol 125mg, magnesium 2 g and 2.5 of Versed for acute agitation.  He was placed on CPAP.     Upon arrival to ED,  patient was tachypneic to 30s and hypertensive 150s/60s. He was transitioned from CPAP to BIPAP. Labs notable for WBC 12.8, Hg 12.5, Bicarb 34, , VBG 7.209/PCO2 104/PO2 27/PHCO3 41/BE 14+- while on Bipap. CXR w/interstitial coarsening- interstitial pneumonia vs.pulm edema. Admitted to MICU due to need for continuous bipap.         Overview/Hospital Course:  ICU Course:  Patient was started on azithro/rocephin for suspected CAP/ treatment for COPD exacerbation. Continued on prednisone 40mg qday. Breathing treatments set at every 6 hours. Patient was weaned to bipap at nighttime and w/naps. On 3L BNC. Most recent VBG w/pH 7.37/pCO2 30/pO2 30/pHCO3 43/BE 18. Deemed stable for transfer to hospital medicine 03/14.    Hospital Medicine Course:  Patient officially transferred to hospital medicine service 03/16. He was doing well- on home oxygen settings. Repeat ECG with normal Qtc. Patient was transitioned to levofloxacin to complete 5d course. Sent script to complete 5d of prednisone total. Outpatient follow-up scheduled w/PCP and pulmonology. Patient was stable for discharge to home 03/16, but had delays in arranging home bipap. Awaiting approval from  insurance company.         Telemedicine  This service was provided by Virtual Visit.    Patient was transferred to [facility] on:  3/17/2022    Chief Complaint   Patient presents with    Shortness of Breath     Pt brought to ED via [facility] from home on CPAP for SOB that started a few hours ago.      The patient location is: 4/4 A   Admitted 3/13/2022  7:17 PM  Present with the patient at the time of the telemed/virtual assessment: Telepresenter    Interval History / Events Overnight:   The patient is able to provide adequate history. Additional history was obtained from past medical records. No significant events reported by Nursing.   Patient complains of post-prandial abdominal discomfort. Symptoms have been unchanged since yesterday. Associated symptoms include: fatigue. Symptoms are stable.     Lab test(s) reviewed: H&H stable    Review of Systems   Constitutional:  Negative for fever.   Respiratory:  Negative for chest tightness.      Objective:     Vital Signs (Most Recent):  Temp: 97.8 °F (36.6 °C) (03/20/22 0836)  Pulse: 74 (03/20/22 1107)  Resp: 16 (03/20/22 0706)  BP: 139/77 (03/20/22 0836)  SpO2: (!) 93 % (03/20/22 0836)   Vital Signs (24h Range):  Temp:  [96.9 °F (36.1 °C)-98 °F (36.7 °C)] 97.8 °F (36.6 °C)  Pulse:  [65-76] 74  Resp:  [16-20] 16  SpO2:  [90 %-98 %] 93 %  BP: (110-150)/(64-83) 139/77     Weight: 68.4 kg (150 lb 12.7 oz)  Body mass index is 23.62 kg/m².    Intake/Output Summary (Last 24 hours) at 3/20/2022 1213  Last data filed at 3/20/2022 0400  Gross per 24 hour   Intake --   Output 200 ml   Net -200 ml        Physical Exam  Constitutional:       General: He is not in acute distress.     Appearance: Normal appearance.   Eyes:      General: Lids are normal. No scleral icterus.        Right eye: No discharge.         Left eye: No discharge.      Conjunctiva/sclera: Conjunctivae normal.   Neck:      Trachea: Phonation normal.   Cardiovascular:      Rate and Rhythm: Normal  rate.      Comments: Monitor / Vital signs reviewed at time of visit  Pulmonary:      Effort: Pulmonary effort is normal. No tachypnea, accessory muscle usage or respiratory distress.   Abdominal:      General: There is no distension.   Neurological:      Mental Status: He is alert. He is not disoriented.   Psychiatric:         Attention and Perception: Attention normal.         Mood and Affect: Affect normal.         Behavior: Behavior is cooperative.       Significant Labs:    Recent Labs   Lab 02/10/22  0345   HGBA1C 5.7*       Recent Labs   Lab 03/18/22  0520 03/19/22  0303 03/20/22  0445   WBC 10.22 9.31 8.67   HGB 11.5* 11.6* 11.9*   HCT 37.4* 38.6* 39.6*    270 234       Recent Labs   Lab 03/18/22  0520 03/19/22 0303 03/20/22  0445   GRAN 78.1*  8.0* 83.3*  7.8* 53.4  4.6   LYMPH 13.3*  1.4 11.5*  1.1 34.9  3.0   MONO 7.5  0.8 4.5  0.4 8.7  0.8   EOS 0.0 0.0 0.2       Recent Labs   Lab 03/18/22 0520 03/19/22  0303 03/20/22  0445    139 139   K 4.1 4.6 4.3    102 102   CO2 35* 32* 31*   BUN 16 16 18   CREATININE 0.8 0.8 0.9   * 74 96   CALCIUM 9.2 9.1 8.8   ALBUMIN 2.6* 2.7* 2.7*   MG 2.0 1.9 1.9   PHOS 2.5* 3.3 3.0       Recent Labs   Lab 03/14/22  0552 03/15/22  0419 03/18/22  0520 03/19/22  0303 03/20/22  0445   ALKPHOS 82   < > 62 62 76   ALT 30   < > 25 26 24   AST 19   < > 11 12 12   PROT 6.1   < > 5.6* 5.8* 5.7*   BILITOT 0.3   < > 0.2 0.2 0.4   INR 1.0  --   --   --   --     < > = values in this interval not displayed.       Recent Labs   Lab 03/13/22 1924 03/13/22 2020   TROPONINI  --  0.025   *  --        Recent Labs   Lab 12/21/21  1849 01/28/22  0514 03/06/22  0348   PROCAL  --   --  0.10   DDIMER 0.91* 0.67*  --        SARS-CoV2 (COVID-19) Qualitative PCR (no units)   Date Value   08/19/2021 Not Detected   08/05/2021 Detected (A)   07/29/2021 Not Detected   07/22/2021 Not Detected   06/24/2021 Not Detected   06/17/2021 Not Detected   04/24/2021 Not  Detected   04/23/2020 Not Detected   04/21/2020 Not Detected     SARS-CoV-2 RNA, Amplification, Qual (no units)   Date Value   06/10/2021 Negative   07/20/2020 Negative   06/25/2020 Negative   06/04/2020 Negative   05/23/2020 Negative   05/12/2020 Negative   05/06/2020 Negative   04/19/2020 Negative   04/03/2020 Negative     POC Rapid COVID (no units)   Date Value   03/13/2022 Negative   03/05/2022 Negative   02/09/2022 Negative   02/07/2022 Negative   01/28/2022 Negative   01/15/2022 Negative   01/01/2022 Negative   12/21/2021 Negative   06/07/2021 Negative   06/03/2021 Negative       ECG Results              EKG 12-lead (Final result)  Result time 03/14/22 16:40:40      Final result by Interface, Lab In [facility] (03/14/22 16:40:40)                   Narrative:    Test Reason : R06.02,    Vent. Rate : 102 BPM     Atrial Rate : 102 BPM     P-R Int : 160 ms          QRS Dur : 086 ms      QT Int : 354 ms       P-R-T Axes : 083 079 085 degrees     QTc Int : 461 ms    Sinus tachycardia  Right atrial enlargement  Borderline Abnormal ECG  When compared with ECG of 05-MAR-2022 15:30,  No significant change was found  Confirmed by Bryan DOBBINS MD ([id number]) on 3/14/2022 3:41:33 PM    Referred By: [email or web address]   SELF           Confirmed By:Bryan DOBBINS MD                                    Results for orders placed during the hospital encounter of 01/28/22    Echo    Interpretation Summary  · The left ventricle is normal in size with normal systolic function.  · Mild left atrial enlargement.  · There is pulmonary hypertension.  · The estimated ejection fraction is 55%.  · Grade I left ventricular diastolic dysfunction.  · Study quality was technically difficult  · Normal right ventricular size with normal right ventricular systolic function.  · Normal central venous pressure (3 mmHg).  · The estimated PA systolic pressure is 50 mmHg.  · Interatrial septal aneurysm  · Mild tricuspid regurgitation.      X-Ray Chest AP Portable  Narrative:  EXAMINATION:  XR CHEST AP PORTABLE    CLINICAL HISTORY:  Shortness of breath    TECHNIQUE:  Single frontal view of the chest was performed.    COMPARISON:  Chest radiograph 03/05/2022    FINDINGS:  Monitoring leads and tubing overlie the chest.    Cardiomediastinal silhouette is midline and stable.  Pulmonary vasculature and hilar contours are unchanged.  Few scattered linear opacities throughout the lungs consistent with minimal platelike scarring versus atelectasis.  Bilateral diffuse nonspecific interstitial coarsening with a perihilar and upper lobe predominance slightly more so on the left, with central peribronchial cuffing and overall increased from 03/05/2022 study.  The lungs are otherwise symmetrically hyperexpanded without large consolidation, pleural effusion or pneumothorax.  No acute osseous process seen.  Impression: Left greater than right perihilar and upper lung zone predominant interval increased nonspecific interstitial coarsening which can be seen with worsening interstitial type pneumonia from inflammatory or infectious process including atypical bacterial or viral etiology versus more atypical distribution of pulmonary edema.  No large consolidation.    Electronically signed by: Mckinley Talbert MD  Date:    03/13/2022  Time:    19:56      Labs and Imaging within the last 24 hours listed above were reviewed.       Diet: Diet Cardiac [facility]  Diet Cardiac  Significant LDAs:   IV Access Type: Peripheral  Urinary Catheter Indication if present: Patient Does Not Have Urinary Catheter  Other Lines/Tubes/Drains:    HIGH RISK CONDITION(S):   Patient has a condition that poses threat to life and bodily function: Severe Respiratory Distress     Goals of Care:    Previous admission:  3/5/22  Likely prognosis:  Fair  Code Status: Full Code  Comfort Only: No  Hospice: No  Goals at discharge: remain at home, with physician follow-up    Discharge Planning   RUTH: 3/21/2022     Code Status: Full Code    Is the patient medically ready for discharge?: Yes    Reason for patient still in hospital (select all that apply): Patient trending condition and Other (specify) awaiting BiPAP  Discharge Plan A: Home          Assessment/Plan:      * Acute on chronic respiratory failure with hypercapnia  Uses home O2 at 2 - 3 LPM  Needs BiPAP - awaiting Medicaid approval  Doing better on 1 LPM O2    BiPAP (biphasic positive airway pressure) dependence  Awaiting insurance approval for BiPAP    Epigastric pain  With history or gastric ulcer  Continue PPI. Added TUMS and Carafate 3/20    Chronic diastolic heart failure  Control BP; continuing home medications  Monitor    Chronic obstructive pulmonary disease  Patient having difficulty using his inhalers - PharmD assistance appreciated.    Tobacco abuse  - Cessation counseling  - Nicotine patch     Essential hypertension  - Continue home Coreg and Lasix    COPD exacerbation  Wean O2 as tolerated to keep O2 saturations >88%  -  Repeat ECG without prolonged QTc- stopped Rocephin and azithro and transition to levaquin   - Continue prednisone to complete course  - Scheduled duonebs q 6 hrs        Active Hospital Problems    Diagnosis  POA    *Acute on chronic respiratory failure with hypercapnia [J96.22]  Yes    BiPAP (biphasic positive airway pressure) dependence [Z99.89]  Not Applicable    Epigastric pain [R10.13]  Yes    Chronic diastolic heart failure [I50.32]  Yes    Chronic obstructive pulmonary disease [J44.9]  Yes     Chronic     Per my interpretation spirometry shows significant obstruction-FEV1 28% of predicted, moderate restriction and significantly decreased DLCO.     MMRC 3-4 symptoms of dyspnea-has home oxygen  Greater than 10 COPD exacerbations within 1 year;  Had recent exacerbation few weeks ago.  Prescribe prednisone and antibiotics seems to be better.  Concerns for noncompliance with inhaler regimen  Approximately to hospitalizations for COPD within the last 1  year  Notes to have a chronic cough  Significant smoking history  History of crack cocaine use quit 20 years  History of working at  [employer] -in the 90s exposed to black sand  Now reporting better living arrangements at Nursing home.       Has failed previous inhaler therapy (advair and Spiriva)   Also, discussed pulmonary rehabilitation   May be considered for advanced lung follow-up- he patient is 61 years old.    Explains he needs to remain smoke free for approximately 6 months    -Encouraged complete smoking cessation. I have referred you to the smoking cessation program.   -Start Breztri. Rinse mouth after use.   -Referal to pulm rehab placed- patient interested.   -Continue albuterol 2 puffs every 4-6 hours as needed for shortness of breath or wheezing.   - Please obtain CT of chest in relation to previous abnormal CT - for follow up. Would recommend annual low dose CT screening in relation to your smoking history.   -Please make sure pneumonia vaccine is up to date. Thinks could have obtained the pneumonia vaccine. Would like to follow up with daughters of adelita and [employer].   -Encourage COVID vaccine.   -Continue home oxygen as prescribed.   -Follow up in 8 weeks or sooner if needed.       Tobacco abuse [Z72.0]  Yes     Chronic    Essential hypertension [I10]  Yes     Chronic    COPD exacerbation [J44.1]  Yes      Resolved Hospital Problems   No resolved problems to display.       Inpatient Medications Prescribed for Management of Current Problems:     Scheduled Meds:    albuterol-ipratropium  3 mL Nebulization Q6H    carvediloL  12.5 mg Oral BID WM    enoxaparin  40 mg Subcutaneous Daily    furosemide  20 mg Oral Daily    nicotine  1 patch Transdermal Daily    pantoprazole  40 mg Oral BID    sucralfate  1 g Oral QID (AC & HS)     Continuous Infusions:   As Needed: calcium carbonate, cloNIDine, hydrALAZINE, influenza, simethicone, sodium chloride 0.9%    VTE Risk Mitigation (From admission,  onward)         Ordered     enoxaparin injection 40 mg  Daily         03/13/22 2246     IP VTE HIGH RISK PATIENT  Once         03/13/22 2246     Place sequential compression device  Until discontinued         03/13/22 2246              I have assessed these finding virtually using telemed platform and with assistance of bedside nurse     The attending portion of this evaluation, treatment, and documentation was performed per Janice Schultz MD via Telemedicine AudioVisual using the secure [employer] software platform with 2 way audio/video. The provider was located off-site and the patient is located in the hospital. The aforementioned video software was utilized to document the relevant history and physical exam    Janice Schultz MD  Department of Hospital Medicine   [facility]

## 2022-03-22 LAB
ALBUMIN SERPL BCP-MCNC: 2.8 G/DL (ref 3.5–5.2)
ALP SERPL-CCNC: 65 U/L (ref 55–135)
ALT SERPL W/O P-5'-P-CCNC: 18 U/L (ref 10–44)
ANION GAP SERPL CALC-SCNC: 8 MMOL/L (ref 8–16)
AST SERPL-CCNC: 12 U/L (ref 10–40)
BASOPHILS # BLD AUTO: 0.02 K/UL (ref 0–0.2)
BASOPHILS NFR BLD: 0.3 % (ref 0–1.9)
BILIRUB SERPL-MCNC: 0.7 MG/DL (ref 0.1–1)
BUN SERPL-MCNC: 18 MG/DL (ref 6–20)
CALCIUM SERPL-MCNC: 9.2 MG/DL (ref 8.7–10.5)
CHLORIDE SERPL-SCNC: 97 MMOL/L (ref 95–110)
CO2 SERPL-SCNC: 30 MMOL/L (ref 23–29)
CREAT SERPL-MCNC: 0.9 MG/DL (ref 0.5–1.4)
DIFFERENTIAL METHOD: ABNORMAL
EOSINOPHIL # BLD AUTO: 0.3 K/UL (ref 0–0.5)
EOSINOPHIL NFR BLD: 4.1 % (ref 0–8)
ERYTHROCYTE [DISTWIDTH] IN BLOOD BY AUTOMATED COUNT: 18 % (ref 11.5–14.5)
EST. GFR  (AFRICAN AMERICAN): >60 ML/MIN/1.73 M^2
EST. GFR  (NON AFRICAN AMERICAN): >60 ML/MIN/1.73 M^2
GLUCOSE SERPL-MCNC: 88 MG/DL (ref 70–110)
HCT VFR BLD AUTO: 42.9 % (ref 40–54)
HGB BLD-MCNC: 13.3 G/DL (ref 14–18)
IMM GRANULOCYTES # BLD AUTO: 0.08 K/UL (ref 0–0.04)
IMM GRANULOCYTES NFR BLD AUTO: 1 % (ref 0–0.5)
LYMPHOCYTES # BLD AUTO: 2.4 K/UL (ref 1–4.8)
LYMPHOCYTES NFR BLD: 30.3 % (ref 18–48)
MAGNESIUM SERPL-MCNC: 2 MG/DL (ref 1.6–2.6)
MCH RBC QN AUTO: 26 PG (ref 27–31)
MCHC RBC AUTO-ENTMCNC: 31 G/DL (ref 32–36)
MCV RBC AUTO: 84 FL (ref 82–98)
MONOCYTES # BLD AUTO: 0.9 K/UL (ref 0.3–1)
MONOCYTES NFR BLD: 11.5 % (ref 4–15)
NEUTROPHILS # BLD AUTO: 4.2 K/UL (ref 1.8–7.7)
NEUTROPHILS NFR BLD: 52.8 % (ref 38–73)
NRBC BLD-RTO: 0 /100 WBC
PHOSPHATE SERPL-MCNC: 2.9 MG/DL (ref 2.7–4.5)
PLATELET # BLD AUTO: 239 K/UL (ref 150–450)
PMV BLD AUTO: 11.1 FL (ref 9.2–12.9)
POTASSIUM SERPL-SCNC: 4.1 MMOL/L (ref 3.5–5.1)
PROT SERPL-MCNC: 6.3 G/DL (ref 6–8.4)
RBC # BLD AUTO: 5.12 M/UL (ref 4.6–6.2)
SODIUM SERPL-SCNC: 135 MMOL/L (ref 136–145)
WBC # BLD AUTO: 7.85 K/UL (ref 3.9–12.7)

## 2022-03-22 PROCEDURE — 11000001 HC ACUTE MED/SURG PRIVATE ROOM

## 2022-03-22 PROCEDURE — 25000003 PHARM REV CODE 250: Performed by: INTERNAL MEDICINE

## 2022-03-22 PROCEDURE — 83735 ASSAY OF MAGNESIUM: CPT | Performed by: PHYSICIAN ASSISTANT

## 2022-03-22 PROCEDURE — 99900035 HC TECH TIME PER 15 MIN (STAT)

## 2022-03-22 PROCEDURE — 85025 COMPLETE CBC W/AUTO DIFF WBC: CPT | Performed by: PHYSICIAN ASSISTANT

## 2022-03-22 PROCEDURE — 94761 N-INVAS EAR/PLS OXIMETRY MLT: CPT

## 2022-03-22 PROCEDURE — 80053 COMPREHEN METABOLIC PANEL: CPT | Performed by: PHYSICIAN ASSISTANT

## 2022-03-22 PROCEDURE — 25000003 PHARM REV CODE 250: Performed by: NURSE PRACTITIONER

## 2022-03-22 PROCEDURE — 25000242 PHARM REV CODE 250 ALT 637 W/ HCPCS: Performed by: PHYSICIAN ASSISTANT

## 2022-03-22 PROCEDURE — 84100 ASSAY OF PHOSPHORUS: CPT | Performed by: PHYSICIAN ASSISTANT

## 2022-03-22 PROCEDURE — 36415 COLL VENOUS BLD VENIPUNCTURE: CPT | Performed by: PHYSICIAN ASSISTANT

## 2022-03-22 PROCEDURE — 94660 CPAP INITIATION&MGMT: CPT

## 2022-03-22 PROCEDURE — S4991 NICOTINE PATCH NONLEGEND: HCPCS | Performed by: INTERNAL MEDICINE

## 2022-03-22 PROCEDURE — 94640 AIRWAY INHALATION TREATMENT: CPT

## 2022-03-22 PROCEDURE — 27000221 HC OXYGEN, UP TO 24 HOURS

## 2022-03-22 PROCEDURE — 99232 SBSQ HOSP IP/OBS MODERATE 35: CPT | Mod: 95,,, | Performed by: INTERNAL MEDICINE

## 2022-03-22 PROCEDURE — 99232 PR SUBSEQUENT HOSPITAL CARE,LEVL II: ICD-10-PCS | Mod: 95,,, | Performed by: INTERNAL MEDICINE

## 2022-03-22 PROCEDURE — 25000003 PHARM REV CODE 250: Performed by: PHYSICIAN ASSISTANT

## 2022-03-22 RX ADMIN — SUCRALFATE 1 G: 1 TABLET ORAL at 11:03

## 2022-03-22 RX ADMIN — LIDOCAINE HYDROCHLORIDE 10 ML: 20 SOLUTION ORAL; TOPICAL at 04:03

## 2022-03-22 RX ADMIN — IPRATROPIUM BROMIDE AND ALBUTEROL SULFATE 3 ML: 2.5; .5 SOLUTION RESPIRATORY (INHALATION) at 12:03

## 2022-03-22 RX ADMIN — NICOTINE 1 PATCH: 14 PATCH, EXTENDED RELEASE TRANSDERMAL at 08:03

## 2022-03-22 RX ADMIN — PANTOPRAZOLE SODIUM 40 MG: 40 TABLET, DELAYED RELEASE ORAL at 08:03

## 2022-03-22 RX ADMIN — ALUMINUM HYDROXIDE, MAGNESIUM HYDROXIDE, AND SIMETHICONE 30 ML: 200; 200; 20 SUSPENSION ORAL at 04:03

## 2022-03-22 RX ADMIN — FUROSEMIDE 20 MG: 20 TABLET ORAL at 08:03

## 2022-03-22 RX ADMIN — CARVEDILOL 12.5 MG: 12.5 TABLET, FILM COATED ORAL at 08:03

## 2022-03-22 RX ADMIN — SUCRALFATE 1 G: 1 TABLET ORAL at 06:03

## 2022-03-22 RX ADMIN — SUCRALFATE 1 G: 1 TABLET ORAL at 08:03

## 2022-03-22 RX ADMIN — CARVEDILOL 12.5 MG: 12.5 TABLET, FILM COATED ORAL at 04:03

## 2022-03-22 RX ADMIN — SUCRALFATE 1 G: 1 TABLET ORAL at 04:03

## 2022-03-22 RX ADMIN — ACETAMINOPHEN 650 MG: 325 TABLET ORAL at 08:03

## 2022-03-22 RX ADMIN — IPRATROPIUM BROMIDE AND ALBUTEROL SULFATE 3 ML: 2.5; .5 SOLUTION RESPIRATORY (INHALATION) at 08:03

## 2022-03-22 RX ADMIN — IPRATROPIUM BROMIDE AND ALBUTEROL SULFATE 3 ML: 2.5; .5 SOLUTION RESPIRATORY (INHALATION) at 07:03

## 2022-03-22 NOTE — PROGRESS NOTES
Sherif gordo - [facility] Medicine  Telemedicine Progress Note    Patient Name: Baltazar Mccray  MRN: [id number]  Patient Class: IP- Inpatient   Admission Date: 3/13/2022  Length of Stay: 8 days  Attending Physician: Janice Schultz MD  Primary Care Provider: Jaden    Subjective:     Principal Problem:Acute on chronic respiratory failure with hypercapnia    HPI:  Mei Mccray 61yoM w/PMHx of CHF, COPD, HTN, AFib who presented to [facility] [place] w/ shortness of breath 03/13.  Per EMS, patient had been feeling short of breath all day.  He was found to be hypoxic with increased work of breathing. En route to ED, he received 1 DuoNeb, Solu-Medrol 125mg, magnesium 2 g and 2.5 of Versed for acute agitation.  He was placed on CPAP.     Upon arrival to ED,  patient was tachypneic to 30s and hypertensive 150s/60s. He was transitioned from CPAP to BIPAP. Labs notable for WBC 12.8, Hg 12.5, Bicarb 34, , VBG 7.209/PCO2 104/PO2 27/PHCO3 41/BE 14+- while on Bipap. CXR w/interstitial coarsening- interstitial pneumonia vs.pulm edema. Admitted to MICU due to need for continuous bipap.         Overview/Hospital Course:  ICU Course:  Patient was started on azithro/rocephin for suspected CAP/ treatment for COPD exacerbation. Continued on prednisone 40mg qday. Breathing treatments set at every 6 hours. Patient was weaned to bipap at nighttime and w/naps. On 3L BNC. Most recent VBG w/pH 7.37/pCO2 30/pO2 30/pHCO3 43/BE 18. Deemed stable for transfer to hospital medicine 03/14.    Hospital Medicine Course:  Patient officially transferred to hospital medicine service 03/16. He was doing well- on home oxygen settings. Repeat ECG with normal Qtc. Patient was transitioned to levofloxacin to complete 5d course. Sent script to complete 5d of prednisone total. Outpatient follow-up scheduled w/PCP and pulmonology. Patient was stable for discharge to home 03/16, but had delays in arranging home bipap. Awaiting approval from  insurance company.         Telemedicine  This service was provided by Virtual Visit.    Patient was transferred to [facility] on:  3/17/2022    Chief Complaint   Patient presents with    Shortness of Breath     Pt brought to ED via NOHD from home on CPAP for SOB that started a few hours ago.      The patient location is: 4/[facility]   Admitted 3/13/2022  7:17 PM  Present with the patient at the time of the telemed/virtual assessment: Telepresenter    Interval History / Events Overnight:   The patient is able to provide adequate history. Additional history was obtained from past medical records. No significant events reported by Nursing.   Patient complains of post-prandial abdominal discomfort. Symptoms have been unchanged since yesterday. Associated symptoms include: fatigue. Symptoms are stable.     Lab test(s) reviewed: H&H stable    Review of Systems   Constitutional:  Negative for fever.   Respiratory:  Negative for chest tightness.      Objective:     Vital Signs (Most Recent):  Temp: 96.5 °F (35.8 °C) (03/21/22 1051)  Pulse: 76 (03/21/22 1051)  Resp: 16 (03/21/22 1051)  BP: 136/72 (03/21/22 1051)  SpO2: (!) 94 % (03/21/22 1051)   Vital Signs (24h Range):  Temp:  [96.5 °F (35.8 °C)-98.6 °F (37 °C)] 96.5 °F (35.8 °C)  Pulse:  [71-95] 76  Resp:  [16-30] 16  SpO2:  [87 %-98 %] 94 %  BP: (102-136)/(64-77) 136/72     Weight: 68.4 kg (150 lb 12.7 oz)  Body mass index is 23.62 kg/m².    Intake/Output Summary (Last 24 hours) at 3/21/2022 1113  Last data filed at 3/21/2022 0600  Gross per 24 hour   Intake 300 ml   Output 150 ml   Net 150 ml        Physical Exam  Constitutional:       General: He is not in acute distress.     Appearance: Normal appearance.   Eyes:      General: Lids are normal. No scleral icterus.        Right eye: No discharge.         Left eye: No discharge.      Conjunctiva/sclera: Conjunctivae normal.   Neck:      Trachea: Phonation normal.   Cardiovascular:      Rate and Rhythm:  Normal rate.      Comments: Monitor / Vital signs reviewed at time of visit  Pulmonary:      Effort: Pulmonary effort is normal. No tachypnea, accessory muscle usage or respiratory distress.   Abdominal:      General: There is no distension.   Neurological:      Mental Status: He is alert. He is not disoriented.   Psychiatric:         Attention and Perception: Attention normal.         Mood and Affect: Affect normal.         Behavior: Behavior is cooperative.       Significant Labs:    Recent Labs   Lab 02/10/22  0345   HGBA1C 5.7*       Recent Labs   Lab 03/19/22  0303 03/20/22  0445 03/21/22  0235   WBC 9.31 8.67 8.78   HGB 11.6* 11.9* 12.7*   HCT 38.6* 39.6* 41.2    234 260       Recent Labs   Lab 03/19/22  0303 03/20/22  0445 03/21/22  0235   GRAN 83.3*  7.8* 53.4  4.6 54.2  4.8   LYMPH 11.5*  1.1 34.9  3.0 31.1  2.7   MONO 4.5  0.4 8.7  0.8 9.8  0.9   EOS 0.0 0.2 0.3       Recent Labs   Lab 03/19/22  0303 03/20/22  0445 03/21/22  0235    139 140   K 4.6 4.3 4.3    102 98   CO2 32* 31* 33*   BUN 16 18 20   CREATININE 0.8 0.9 1.1   GLU 74 96 83   CALCIUM 9.1 8.8 9.1   ALBUMIN 2.7* 2.7* 2.8*   MG 1.9 1.9 1.9   PHOS 3.3 3.0 3.2       Recent Labs   Lab 03/19/22  0303 03/20/22  0445 03/21/22  0235   ALKPHOS 62 76 69   ALT 26 24 23   AST 12 12 11   PROT 5.8* 5.7* 6.2   BILITOT 0.2 0.4 0.6       No results for input(s): CPK, TROPONINI, BNP in the last 168 hours.    Recent Labs   Lab 12/21/21  1849 01/28/22  0514 03/06/22  0348   PROCAL  --   --  0.10   DDIMER 0.91* 0.67*  --        SARS-CoV2 (COVID-19) Qualitative PCR (no units)   Date Value   08/19/2021 Not Detected   08/05/2021 Detected (A)   07/29/2021 Not Detected   07/22/2021 Not Detected   06/24/2021 Not Detected   06/17/2021 Not Detected   04/24/2021 Not Detected   04/23/2020 Not Detected   04/21/2020 Not Detected     SARS-CoV-2 RNA, Amplification, Qual (no units)   Date Value   06/10/2021 Negative   07/20/2020 Negative   06/25/2020  Negative   06/04/2020 Negative   05/23/2020 Negative   05/12/2020 Negative   05/06/2020 Negative   04/19/2020 Negative   04/03/2020 Negative     POC Rapid COVID (no units)   Date Value   03/13/2022 Negative   03/05/2022 Negative   02/09/2022 Negative   02/07/2022 Negative   01/28/2022 Negative   01/15/2022 Negative   01/01/2022 Negative   12/21/2021 Negative   06/07/2021 Negative   06/03/2021 Negative       ECG Results              EKG 12-lead (Final result)  Result time 03/14/22 16:40:40      Final result by Interface, Lab In [facility] (03/14/22 16:40:40)                   Narrative:    Test Reason : R06.02,    Vent. Rate : 102 BPM     Atrial Rate : 102 BPM     P-R Int : 160 ms          QRS Dur : 086 ms      QT Int : 354 ms       P-R-T Axes : 083 079 085 degrees     QTc Int : 461 ms    Sinus tachycardia  Right atrial enlargement  Borderline Abnormal ECG  When compared with ECG of 05-MAR-2022 15:30,  No significant change was found  Confirmed by Bryan DOBBINS MD ([id number]) on 3/14/2022 3:41:33 PM    Referred By: [email or web address]   SELF           Confirmed By:Bryan DOBBINS MD                                    Results for orders placed during the hospital encounter of 01/28/22    Echo    Interpretation Summary  · The left ventricle is normal in size with normal systolic function.  · Mild left atrial enlargement.  · There is pulmonary hypertension.  · The estimated ejection fraction is 55%.  · Grade I left ventricular diastolic dysfunction.  · Study quality was technically difficult  · Normal right ventricular size with normal right ventricular systolic function.  · Normal central venous pressure (3 mmHg).  · The estimated PA systolic pressure is 50 mmHg.  · Interatrial septal aneurysm  · Mild tricuspid regurgitation.      X-Ray Chest AP Portable  Narrative: EXAMINATION:  XR CHEST AP PORTABLE    CLINICAL HISTORY:  Shortness of breath    TECHNIQUE:  Single frontal view of the chest was performed.    COMPARISON:  Chest radiograph  03/05/2022    FINDINGS:  Monitoring leads and tubing overlie the chest.    Cardiomediastinal silhouette is midline and stable.  Pulmonary vasculature and hilar contours are unchanged.  Few scattered linear opacities throughout the lungs consistent with minimal platelike scarring versus atelectasis.  Bilateral diffuse nonspecific interstitial coarsening with a perihilar and upper lobe predominance slightly more so on the left, with central peribronchial cuffing and overall increased from 03/05/2022 study.  The lungs are otherwise symmetrically hyperexpanded without large consolidation, pleural effusion or pneumothorax.  No acute osseous process seen.  Impression: Left greater than right perihilar and upper lung zone predominant interval increased nonspecific interstitial coarsening which can be seen with worsening interstitial type pneumonia from inflammatory or infectious process including atypical bacterial or viral etiology versus more atypical distribution of pulmonary edema.  No large consolidation.    Electronically signed by: Mckinley Talbert MD  Date:    03/13/2022  Time:    19:56      Labs and Imaging within the last 24 hours listed above were reviewed.       Diet: Diet Cardiac [facility]  Diet Cardiac  Significant LDAs:   IV Access Type: Peripheral  Urinary Catheter Indication if present: Patient Does Not Have Urinary Catheter  Other Lines/Tubes/Drains:    HIGH RISK CONDITION(S):   Patient has a condition that poses threat to life and bodily function: Severe Respiratory Distress     Goals of Care:    Previous admission:  3/5/22  Likely prognosis:  Fair  Code Status: Full Code  Comfort Only: No  Hospice: No  Goals at discharge: remain at home, with physician follow-up    Discharge Planning   RUTH: 3/21/2022     Code Status: Full Code   Is the patient medically ready for discharge?: Yes    Reason for patient still in hospital (select all that apply): Patient trending condition and Other (specify) awaiting  BiPAP  Discharge Plan A: Home        Assessment/Plan:      * Acute on chronic respiratory failure with hypercapnia  Uses home O2 at 2 - 3 LPM  Needs BiPAP - awaiting Medicaid approval  Doing better on 1 LPM O2    BiPAP (biphasic positive airway pressure) dependence  Awaiting insurance approval for BiPAP    Epigastric pain  With history or gastric ulcer  Continue PPI. Added TUMS and Carafate 3/20  Persisting - trial of GI cocktail; need to reschedule appointment with GI as soon as possible    Chronic diastolic heart failure  Control BP; continuing home medications  Monitor    Chronic obstructive pulmonary disease  Patient having difficulty using his inhalers - PharmD assistance appreciated.    Tobacco abuse  - Cessation counseling  - Nicotine patch     Essential hypertension  - Continue home Coreg and Lasix    COPD exacerbation  Wean O2 as tolerated to keep O2 saturations >88%  -  Repeat ECG without prolonged QTc- stopped Rocephin and azithro and transition to levaquin   - Continue prednisone to complete course  - Scheduled duonebs q 6 hrs      Active Hospital Problems    Diagnosis  POA    *Acute on chronic respiratory failure with hypercapnia [J96.22]  Yes    BiPAP (biphasic positive airway pressure) dependence [Z99.89]  Not Applicable    Epigastric pain [R10.13]  Yes    Chronic diastolic heart failure [I50.32]  Yes    Chronic obstructive pulmonary disease [J44.9]  Yes     Chronic     Per my interpretation spirometry shows significant obstruction-FEV1 28% of predicted, moderate restriction and significantly decreased DLCO.     MMRC 3-4 symptoms of dyspnea-has home oxygen  Greater than 10 COPD exacerbations within 1 year;  Had recent exacerbation few weeks ago.  Prescribe prednisone and antibiotics seems to be better.  Concerns for noncompliance with inhaler regimen  Approximately to hospitalizations for COPD within the last 1 year  Notes to have a chronic cough  Significant smoking history  History of crack  cocaine use quit 20 years  History of working at  [employer] -in the 90s exposed to black sand  Now reporting better living arrangements at Nursing home.       Has failed previous inhaler therapy (advair and Spiriva)   Also, discussed pulmonary rehabilitation   May be considered for advanced lung follow-up- he patient is 61 years old.    Explains he needs to remain smoke free for approximately 6 months    -Encouraged complete smoking cessation. I have referred you to the smoking cessation program.   -Start Breztri. Rinse mouth after use.   -Referal to pulm rehab placed- patient interested.   -Continue albuterol 2 puffs every 4-6 hours as needed for shortness of breath or wheezing.   - Please obtain CT of chest in relation to previous abnormal CT - for follow up. Would recommend annual low dose CT screening in relation to your smoking history.   -Please make sure pneumonia vaccine is up to date. Thinks could have obtained the pneumonia vaccine. Would like to follow up with daughters of adelita and [employer].   -Encourage COVID vaccine.   -Continue home oxygen as prescribed.   -Follow up in 8 weeks or sooner if needed.       Tobacco abuse [Z72.0]  Yes     Chronic    Essential hypertension [I10]  Yes     Chronic    COPD exacerbation [J44.1]  Yes      Resolved Hospital Problems   No resolved problems to display.       Inpatient Medications Prescribed for Management of Current Problems:     Scheduled Meds:    albuterol-ipratropium  3 mL Nebulization Q6H    carvediloL  12.5 mg Oral BID WM    enoxaparin  40 mg Subcutaneous Daily    furosemide  20 mg Oral Daily    nicotine  1 patch Transdermal Daily    pantoprazole  40 mg Oral BID    sucralfate  1 g Oral QID (AC & HS)     Continuous Infusions:   As Needed: acetaminophen, aluminum-magnesium hydroxide-simethicone **AND** LIDOcaine HCl 2%, calcium carbonate, cloNIDine, hydrALAZINE, influenza, simethicone, sodium chloride 0.9%    VTE Risk Mitigation (From admission,  onward)         Ordered     enoxaparin injection 40 mg  Daily         03/13/22 2246     IP VTE HIGH RISK PATIENT  Once         03/13/22 2246     Place sequential compression device  Until discontinued         03/13/22 2246              I have assessed these finding virtually using telemed platform and with assistance of bedside nurse     The attending portion of this evaluation, treatment, and documentation was performed per Janice Schultz MD via Telemedicine AudioVisual using the secure [employer] software platform with 2 way audio/video. The provider was located off-site and the patient is located in the hospital. The aforementioned video software was utilized to document the relevant history and physical exam    Janice Schultz MD  Department of Hospital Medicine   [facility]

## 2022-03-22 NOTE — ASSESSMENT & PLAN NOTE
With history or gastric ulcer  Continue PPI. Added TUMS and Carafate 3/20  Persisting - trial of GI cocktail; need to reschedule appointment with GI as soon as possible

## 2022-03-22 NOTE — PLAN OF CARE
Plan of care discussed with patient. Patient is free of fall/trauma/injury. Denies CP, SOB, or pain/discomfort. All questions addressed. Pt awaiting delivery of HME for DC.  Will continue to monitor    Problem: Adult Inpatient Plan of Care  Goal: Plan of Care Review  Outcome: Ongoing, Progressing  Goal: Patient-Specific Goal (Individualized)  Outcome: Ongoing, Progressing  Goal: Absence of Hospital-Acquired Illness or Injury  Outcome: Ongoing, Progressing  Goal: Optimal Comfort and Wellbeing  Outcome: Ongoing, Progressing  Goal: Readiness for Transition of Care  Outcome: Ongoing, Progressing

## 2022-03-22 NOTE — PLAN OF CARE
Problem: Adult Inpatient Plan of Care  Goal: Patient-Specific Goal (Individualized)  Outcome: Ongoing, Progressing     Problem: Adult Inpatient Plan of Care  Goal: Plan of Care Review  Outcome: Ongoing, Progressing     Problem: Adult Inpatient Plan of Care  Goal: Optimal Comfort and Wellbeing  Outcome: Ongoing, Progressing     Problem: Adult Inpatient Plan of Care  Goal: Readiness for Transition of Care  Outcome: Ongoing, Progressing

## 2022-03-22 NOTE — ASSESSMENT & PLAN NOTE
Wean O2 as tolerated to keep O2 saturations >88%  -  Repeat ECG without prolonged QTc- stopped Rocephin and azithro and transition to levaquin to complete course  - Continued prednisone to complete course  - Scheduled duonebs q 6 hrs

## 2022-03-22 NOTE — PLAN OF CARE
Sherif Valdovinos - [facility]  Discharge Reassessment    Primary Care Provider: Jaden    Expected Discharge Date: 3/22/2022    Reassessment (most recent)     Discharge Reassessment - 03/22/22 1456        Discharge Reassessment    Assessment Type Discharge Planning Reassessment     Did the patient's condition or plan change since previous assessment? No     Discharge Plan discussed with: Patient;Sibling     Discharge Plan A Home     Discharge Plan B Home     DME Needed Upon Discharge  BIPAP     Discharge Barriers Identified Does not adhere to care plan     Why the patient remains in the hospital Insurance issues   Waiting on insurance approval for patient's Bipap.       Post-Acute Status    Discharge Delays Home Medical Equipment (Insurance, Delivery)                 Galina Beasley RN  Ext [phone]

## 2022-03-22 NOTE — SUBJECTIVE & OBJECTIVE
Telemedicine  This service was provided by Virtual Visit.    Patient was transferred to [facility] on:  3/17/2022    Chief Complaint   Patient presents with    Shortness of Breath     Pt brought to ED via NOHD from home on CPAP for SOB that started a few hours ago.      The patient location is: 4/4 A   Admitted 3/13/2022  7:17 PM  Present with the patient at the time of the telemed/virtual assessment: Telepresenter    Interval History / Events Overnight:   The patient is able to provide adequate history. Additional history was obtained from past medical records. No significant events reported by Nursing.   Patient complains of post-prandial abdominal discomfort. Symptoms have improved since yesterday. Associated symptoms include: fatigue. Symptoms are decreasing in severity.     Lab test(s) reviewed: H&H stable    Review of Systems   Constitutional:  Negative for fever.   Respiratory:  Negative for chest tightness.      Objective:     Vital Signs (Most Recent):  Temp: 98 °F (36.7 °C) (03/22/22 0804)  Pulse: 87 (03/22/22 0804)  Resp: 18 (03/22/22 0804)  BP: 128/73 (03/22/22 0804)  SpO2: (!) 89 % (03/22/22 0804)   Vital Signs (24h Range):  Temp:  [96.5 °F (35.8 °C)-99.3 °F (37.4 °C)] 98 °F (36.7 °C)  Pulse:  [76-91] 87  Resp:  [16-20] 18  SpO2:  [89 %-95 %] 89 %  BP: (112-136)/(58-79) 128/73     Weight: 68.4 kg (150 lb 12.7 oz)  Body mass index is 23.62 kg/m².    Intake/Output Summary (Last 24 hours) at 3/22/2022 1030  Last data filed at 3/22/2022 0600  Gross per 24 hour   Intake 522 ml   Output --   Net 522 ml        Physical Exam  Constitutional:       General: He is not in acute distress.     Appearance: Normal appearance.   Eyes:      General: Lids are normal. No scleral icterus.        Right eye: No discharge.         Left eye: No discharge.      Conjunctiva/sclera: Conjunctivae normal.   Neck:      Trachea: Phonation normal.   Cardiovascular:      Rate and Rhythm: Normal rate.      Comments:  Monitor / Vital signs reviewed at time of visit  Pulmonary:      Effort: Pulmonary effort is normal. No tachypnea, accessory muscle usage or respiratory distress.   Abdominal:      General: There is no distension.   Neurological:      Mental Status: He is alert. He is not disoriented.   Psychiatric:         Attention and Perception: Attention normal.         Mood and Affect: Affect normal.         Behavior: Behavior is cooperative.       Significant Labs:    Recent Labs   Lab 02/10/22  0345   HGBA1C 5.7*       Recent Labs   Lab 03/20/22 0445 03/21/22  0235 03/22/22  0300   WBC 8.67 8.78 7.85   HGB 11.9* 12.7* 13.3*   HCT 39.6* 41.2 42.9    260 239       Recent Labs   Lab 03/20/22 0445 03/21/22 0235 03/22/22  0300   GRAN 53.4  4.6 54.2  4.8 52.8  4.2   LYMPH 34.9  3.0 31.1  2.7 30.3  2.4   MONO 8.7  0.8 9.8  0.9 11.5  0.9   EOS 0.2 0.3 0.3       Recent Labs   Lab 03/20/22 0445 03/21/22  0235 03/22/22  0300    140 135*   K 4.3 4.3 4.1    98 97   CO2 31* 33* 30*   BUN 18 20 18   CREATININE 0.9 1.1 0.9   GLU 96 83 88   CALCIUM 8.8 9.1 9.2   ALBUMIN 2.7* 2.8* 2.8*   MG 1.9 1.9 2.0   PHOS 3.0 3.2 2.9       Recent Labs   Lab 03/20/22 0445 03/21/22  0235 03/22/22  0300   ALKPHOS 76 69 65   ALT 24 23 18   AST 12 11 12   PROT 5.7* 6.2 6.3   BILITOT 0.4 0.6 0.7       Recent Labs   Lab 12/21/21  1849 01/28/22  0514 03/06/22  0348   PROCAL  --   --  0.10   DDIMER 0.91* 0.67*  --        SARS-CoV2 (COVID-19) Qualitative PCR (no units)   Date Value   08/19/2021 Not Detected   08/05/2021 Detected (A)   07/29/2021 Not Detected   07/22/2021 Not Detected   06/24/2021 Not Detected   06/17/2021 Not Detected   04/24/2021 Not Detected   04/23/2020 Not Detected   04/21/2020 Not Detected     SARS-CoV-2 RNA, Amplification, Qual (no units)   Date Value   06/10/2021 Negative   07/20/2020 Negative   06/25/2020 Negative   06/04/2020 Negative   05/23/2020 Negative   05/12/2020 Negative   05/06/2020 Negative    04/19/2020 Negative   04/03/2020 Negative     POC Rapid COVID (no units)   Date Value   03/13/2022 Negative   03/05/2022 Negative   02/09/2022 Negative   02/07/2022 Negative   01/28/2022 Negative   01/15/2022 Negative   01/01/2022 Negative   12/21/2021 Negative   06/07/2021 Negative   06/03/2021 Negative       ECG Results              EKG 12-lead (Final result)  Result time 03/14/22 16:40:40      Final result by Interface, Lab In [facility] (03/14/22 16:40:40)                   Narrative:    Test Reason : R06.02,    Vent. Rate : 102 BPM     Atrial Rate : 102 BPM     P-R Int : 160 ms          QRS Dur : 086 ms      QT Int : 354 ms       P-R-T Axes : 083 079 085 degrees     QTc Int : 461 ms    Sinus tachycardia  Right atrial enlargement  Borderline Abnormal ECG  When compared with ECG of 05-MAR-2022 15:30,  No significant change was found  Confirmed by Bryan DOBBINS MD ([id number]) on 3/14/2022 3:41:33 PM    Referred By: [email or web address]   SELF           Confirmed By:Bryan DOBBINS MD                                    Results for orders placed during the hospital encounter of 01/28/22    Echo    Interpretation Summary  · The left ventricle is normal in size with normal systolic function.  · Mild left atrial enlargement.  · There is pulmonary hypertension.  · The estimated ejection fraction is 55%.  · Grade I left ventricular diastolic dysfunction.  · Study quality was technically difficult  · Normal right ventricular size with normal right ventricular systolic function.  · Normal central venous pressure (3 mmHg).  · The estimated PA systolic pressure is 50 mmHg.  · Interatrial septal aneurysm  · Mild tricuspid regurgitation.      X-Ray Chest AP Portable  Narrative: EXAMINATION:  XR CHEST AP PORTABLE    CLINICAL HISTORY:  Shortness of breath    TECHNIQUE:  Single frontal view of the chest was performed.    COMPARISON:  Chest radiograph 03/05/2022    FINDINGS:  Monitoring leads and tubing overlie the chest.    Cardiomediastinal silhouette  is midline and stable.  Pulmonary vasculature and hilar contours are unchanged.  Few scattered linear opacities throughout the lungs consistent with minimal platelike scarring versus atelectasis.  Bilateral diffuse nonspecific interstitial coarsening with a perihilar and upper lobe predominance slightly more so on the left, with central peribronchial cuffing and overall increased from 03/05/2022 study.  The lungs are otherwise symmetrically hyperexpanded without large consolidation, pleural effusion or pneumothorax.  No acute osseous process seen.  Impression: Left greater than right perihilar and upper lung zone predominant interval increased nonspecific interstitial coarsening which can be seen with worsening interstitial type pneumonia from inflammatory or infectious process including atypical bacterial or viral etiology versus more atypical distribution of pulmonary edema.  No large consolidation.    Electronically signed by: Mckinley Talbert MD  Date:    03/13/2022  Time:    19:56      Labs and Imaging within the last 24 hours listed above were reviewed.       Diet: Diet Cardiac [facility]  Diet Cardiac  Significant LDAs:   IV Access Type: Peripheral  Urinary Catheter Indication if present: Patient Does Not Have Urinary Catheter  Other Lines/Tubes/Drains:    HIGH RISK CONDITION(S):   Patient has a condition that poses threat to life and bodily function: Severe Respiratory Distress     Goals of Care:    Previous admission:  3/5/22  Likely prognosis:  Fair  Code Status: Full Code  Comfort Only: No  Hospice: No  Goals at discharge: remain at home, with physician follow-up    Discharge Planning   RUTH: 3/22/2022     Code Status: Full Code   Is the patient medically ready for discharge?: Yes    Reason for patient still in hospital (select all that apply): Patient trending condition and Other (specify) awaiting BiPAP  Discharge Plan A: Home

## 2022-03-22 NOTE — PROGRESS NOTES
[facility] - [facility] Medicine  Telemedicine Progress Note    Patient Name: Baltazar Mccray  MRN: [id number]  Patient Class: IP- Inpatient   Admission Date: 3/13/2022  Length of Stay: 9 days  Attending Physician: Janice Schultz MD  Primary Care Provider: Jaden    Subjective:     Principal Problem:Acute on chronic respiratory failure with hypercapnia    HPI:  Mei Mccray 61yoM w/PMHx of CHF, COPD, HTN, AFib who presented to [facility] w/ shortness of breath 03/13.  Per EMS, patient had been feeling short of breath all day.  He was found to be hypoxic with increased work of breathing. En route to ED, he received 1 DuoNeb, Solu-Medrol 125mg, magnesium 2 g and 2.5 of Versed for acute agitation.  He was placed on CPAP.     Upon arrival to ED,  patient was tachypneic to 30s and hypertensive 150s/60s. He was transitioned from CPAP to BIPAP. Labs notable for WBC 12.8, Hg 12.5, Bicarb 34, , VBG 7.209/PCO2 104/PO2 27/PHCO3 41/BE 14+- while on Bipap. CXR w/interstitial coarsening- interstitial pneumonia vs.pulm edema. Admitted to MICU due to need for continuous bipap.         Overview/Hospital Course:  ICU Course:  Patient was started on azithro/rocephin for suspected CAP/ treatment for COPD exacerbation. Continued on prednisone 40mg qday. Breathing treatments set at every 6 hours. Patient was weaned to bipap at nighttime and w/naps. On 3L BNC. Most recent VBG w/pH 7.37/pCO2 30/pO2 30/pHCO3 43/BE 18. Deemed stable for transfer to hospital medicine 03/14.    Hospital Medicine Course:  Patient officially transferred to hospital medicine service 03/16. He was doing well- on home oxygen settings. Repeat ECG with normal Qtc. Patient was transitioned to levofloxacin to complete 5d course. Sent script to complete 5d of prednisone total. Outpatient follow-up scheduled w/PCP and pulmonology. Patient was stable for discharge to home 03/16, but had delays in arranging home bipap. Awaiting approval from  insurance company.       Telemedicine  This service was provided by Virtual Visit.    Patient was transferred to [facility] on:  3/17/2022    Chief Complaint   Patient presents with    Shortness of Breath     Pt brought to ED via NOHD from home on CPAP for SOB that started a few hours ago.      The patient location is: 4/4 A   Admitted 3/13/2022  7:17 PM  Present with the patient at the time of the telemed/virtual assessment: Telepresenter    Interval History / Events Overnight:   The patient is able to provide adequate history. Additional history was obtained from past medical records. No significant events reported by Nursing.   Patient complains of post-prandial abdominal discomfort. Symptoms have improved since yesterday. Associated symptoms include: fatigue. Symptoms are decreasing in severity.     Lab test(s) reviewed: H&H stable    Review of Systems   Constitutional:  Negative for fever.   Respiratory:  Negative for chest tightness.      Objective:     Vital Signs (Most Recent):  Temp: 98 °F (36.7 °C) (03/22/22 0804)  Pulse: 87 (03/22/22 0804)  Resp: 18 (03/22/22 0804)  BP: 128/73 (03/22/22 0804)  SpO2: (!) 89 % (03/22/22 0804)   Vital Signs (24h Range):  Temp:  [96.5 °F (35.8 °C)-99.3 °F (37.4 °C)] 98 °F (36.7 °C)  Pulse:  [76-91] 87  Resp:  [16-20] 18  SpO2:  [89 %-95 %] 89 %  BP: (112-136)/(58-79) 128/73     Weight: 68.4 kg (150 lb 12.7 oz)  Body mass index is 23.62 kg/m².    Intake/Output Summary (Last 24 hours) at 3/22/2022 1030  Last data filed at 3/22/2022 0600  Gross per 24 hour   Intake 522 ml   Output --   Net 522 ml        Physical Exam  Constitutional:       General: He is not in acute distress.     Appearance: Normal appearance.   Eyes:      General: Lids are normal. No scleral icterus.        Right eye: No discharge.         Left eye: No discharge.      Conjunctiva/sclera: Conjunctivae normal.   Neck:      Trachea: Phonation normal.   Cardiovascular:      Rate and Rhythm:  Normal rate.      Comments: Monitor / Vital signs reviewed at time of visit  Pulmonary:      Effort: Pulmonary effort is normal. No tachypnea, accessory muscle usage or respiratory distress.   Abdominal:      General: There is no distension.   Neurological:      Mental Status: He is alert. He is not disoriented.   Psychiatric:         Attention and Perception: Attention normal.         Mood and Affect: Affect normal.         Behavior: Behavior is cooperative.       Significant Labs:    Recent Labs   Lab 02/10/22  0345   HGBA1C 5.7*       Recent Labs   Lab 03/20/22 0445 03/21/22  0235 03/22/22  0300   WBC 8.67 8.78 7.85   HGB 11.9* 12.7* 13.3*   HCT 39.6* 41.2 42.9    260 239       Recent Labs   Lab 03/20/22 0445 03/21/22 0235 03/22/22  0300   GRAN 53.4  4.6 54.2  4.8 52.8  4.2   LYMPH 34.9  3.0 31.1  2.7 30.3  2.4   MONO 8.7  0.8 9.8  0.9 11.5  0.9   EOS 0.2 0.3 0.3       Recent Labs   Lab 03/20/22 0445 03/21/22  0235 03/22/22  0300    140 135*   K 4.3 4.3 4.1    98 97   CO2 31* 33* 30*   BUN 18 20 18   CREATININE 0.9 1.1 0.9   GLU 96 83 88   CALCIUM 8.8 9.1 9.2   ALBUMIN 2.7* 2.8* 2.8*   MG 1.9 1.9 2.0   PHOS 3.0 3.2 2.9       Recent Labs   Lab 03/20/22 0445 03/21/22  0235 03/22/22  0300   ALKPHOS 76 69 65   ALT 24 23 18   AST 12 11 12   PROT 5.7* 6.2 6.3   BILITOT 0.4 0.6 0.7       Recent Labs   Lab 12/21/21  1849 01/28/22  0514 03/06/22  0348   PROCAL  --   --  0.10   DDIMER 0.91* 0.67*  --        SARS-CoV2 (COVID-19) Qualitative PCR (no units)   Date Value   08/19/2021 Not Detected   08/05/2021 Detected (A)   07/29/2021 Not Detected   07/22/2021 Not Detected   06/24/2021 Not Detected   06/17/2021 Not Detected   04/24/2021 Not Detected   04/23/2020 Not Detected   04/21/2020 Not Detected     SARS-CoV-2 RNA, Amplification, Qual (no units)   Date Value   06/10/2021 Negative   07/20/2020 Negative   06/25/2020 Negative   06/04/2020 Negative   05/23/2020 Negative   05/12/2020 Negative    05/06/2020 Negative   04/19/2020 Negative   04/03/2020 Negative     POC Rapid COVID (no units)   Date Value   03/13/2022 Negative   03/05/2022 Negative   02/09/2022 Negative   02/07/2022 Negative   01/28/2022 Negative   01/15/2022 Negative   01/01/2022 Negative   12/21/2021 Negative   06/07/2021 Negative   06/03/2021 Negative       ECG Results              EKG 12-lead (Final result)  Result time 03/14/22 16:40:40      Final result by Interface, Lab In [facility] (03/14/22 16:40:40)                   Narrative:    Test Reason : R06.02,    Vent. Rate : 102 BPM     Atrial Rate : 102 BPM     P-R Int : 160 ms          QRS Dur : 086 ms      QT Int : 354 ms       P-R-T Axes : 083 079 085 degrees     QTc Int : 461 ms    Sinus tachycardia  Right atrial enlargement  Borderline Abnormal ECG  When compared with ECG of 05-MAR-2022 15:30,  No significant change was found  Confirmed by Bryan DOBBINS MD ([id number]) on 3/14/2022 3:41:33 PM    Referred By: [email or web address]   SELF           Confirmed By:Bryan DOBBINS MD                                    Results for orders placed during the hospital encounter of 01/28/22    Echo    Interpretation Summary  · The left ventricle is normal in size with normal systolic function.  · Mild left atrial enlargement.  · There is pulmonary hypertension.  · The estimated ejection fraction is 55%.  · Grade I left ventricular diastolic dysfunction.  · Study quality was technically difficult  · Normal right ventricular size with normal right ventricular systolic function.  · Normal central venous pressure (3 mmHg).  · The estimated PA systolic pressure is 50 mmHg.  · Interatrial septal aneurysm  · Mild tricuspid regurgitation.      X-Ray Chest AP Portable  Narrative: EXAMINATION:  XR CHEST AP PORTABLE    CLINICAL HISTORY:  Shortness of breath    TECHNIQUE:  Single frontal view of the chest was performed.    COMPARISON:  Chest radiograph 03/05/2022    FINDINGS:  Monitoring leads and tubing overlie the  chest.    Cardiomediastinal silhouette is midline and stable.  Pulmonary vasculature and hilar contours are unchanged.  Few scattered linear opacities throughout the lungs consistent with minimal platelike scarring versus atelectasis.  Bilateral diffuse nonspecific interstitial coarsening with a perihilar and upper lobe predominance slightly more so on the left, with central peribronchial cuffing and overall increased from 03/05/2022 study.  The lungs are otherwise symmetrically hyperexpanded without large consolidation, pleural effusion or pneumothorax.  No acute osseous process seen.  Impression: Left greater than right perihilar and upper lung zone predominant interval increased nonspecific interstitial coarsening which can be seen with worsening interstitial type pneumonia from inflammatory or infectious process including atypical bacterial or viral etiology versus more atypical distribution of pulmonary edema.  No large consolidation.    Electronically signed by: Mckinley Talbert MD  Date:    03/13/2022  Time:    19:56      Labs and Imaging within the last 24 hours listed above were reviewed.       Diet: Diet Cardiac [facility]  Diet Cardiac  Significant LDAs:   IV Access Type: Peripheral  Urinary Catheter Indication if present: Patient Does Not Have Urinary Catheter  Other Lines/Tubes/Drains:    HIGH RISK CONDITION(S):   Patient has a condition that poses threat to life and bodily function: Severe Respiratory Distress     Goals of Care:    Previous admission:  3/5/22  Likely prognosis:  Fair  Code Status: Full Code  Comfort Only: No  Hospice: No  Goals at discharge: remain at home, with physician follow-up    Discharge Planning   RUTH: 3/22/2022     Code Status: Full Code   Is the patient medically ready for discharge?: Yes    Reason for patient still in hospital (select all that apply): Patient trending condition and Other (specify) awaiting BiPAP  Discharge Plan A: Home        Assessment/Plan:      * Acute  on chronic respiratory failure with hypercapnia  Uses home O2 at 2 - 3 LPM  Needs BiPAP - awaiting Medicaid approval  Doing better on 1 LPM O2    BiPAP (biphasic positive airway pressure) dependence  Awaiting insurance approval for BiPAP    Epigastric pain  With history or gastric ulcer  Continue PPI. Added TUMS and Carafate 3/20  Persisting - trial of GI cocktail; need to reschedule appointment with GI as soon as possible    Chronic diastolic heart failure  Control BP; continuing home medications  Monitor    Chronic obstructive pulmonary disease  Patient having difficulty using his inhalers - PharmD assistance appreciated.    Tobacco abuse  - Cessation counseling  - Nicotine patch     Essential hypertension  - Continue home Coreg and Lasix    COPD exacerbation  Wean O2 as tolerated to keep O2 saturations >88%  -  Repeat ECG without prolonged QTc- stopped Rocephin and azithro and transition to levaquin to complete course  - Continued prednisone to complete course  - Scheduled duonebs q 6 hrs        Active Hospital Problems    Diagnosis  POA    *Acute on chronic respiratory failure with hypercapnia [J96.22]  Yes    BiPAP (biphasic positive airway pressure) dependence [Z99.89]  Not Applicable    Epigastric pain [R10.13]  Yes    Chronic diastolic heart failure [I50.32]  Yes    Chronic obstructive pulmonary disease [J44.9]  Yes     Chronic     Per my interpretation spirometry shows significant obstruction-FEV1 28% of predicted, moderate restriction and significantly decreased DLCO.     MMRC 3-4 symptoms of dyspnea-has home oxygen  Greater than 10 COPD exacerbations within 1 year;  Had recent exacerbation few weeks ago.  Prescribe prednisone and antibiotics seems to be better.  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       Has failed previous inhaler therapy (advair and Spiriva)   Also, discussed pulmonary rehabilitation   May be considered for advanced lung follow-up- he patient is 61 years old.    Explains he needs to remain smoke free for approximately 6 months    -Encouraged complete smoking cessation. I have referred you to the smoking cessation program.   -Start Breztri. Rinse mouth after use.   -Referal to pulm rehab placed- patient interested.   -Continue albuterol 2 puffs every 4-6 hours as needed for shortness of breath or wheezing.   - Please obtain CT of chest in relation to previous abnormal CT - for follow up. Would recommend annual low dose CT screening in relation to your smoking history.   -Please make sure pneumonia vaccine is up to date. Thinks could have obtained the pneumonia vaccine. Would like to follow up with daughters of adelita and [employer].   -Encourage COVID vaccine.   -Continue home oxygen as prescribed.   -Follow up in 8 weeks or sooner if needed.       Tobacco abuse [Z72.0]  Yes     Chronic    Essential hypertension [I10]  Yes     Chronic    COPD exacerbation [J44.1]  Yes      Resolved Hospital Problems   No resolved problems to display.       Inpatient Medications Prescribed for Management of Current Problems:     Scheduled Meds:    albuterol-ipratropium  3 mL Nebulization Q6H    carvediloL  12.5 mg Oral BID WM    enoxaparin  40 mg Subcutaneous Daily    furosemide  20 mg Oral Daily    nicotine  1 patch Transdermal Daily    pantoprazole  40 mg Oral BID    sucralfate  1 g Oral QID (AC & HS)     Continuous Infusions:   As Needed: acetaminophen, calcium carbonate, cloNIDine, hydrALAZINE, influenza, simethicone, sodium chloride 0.9%    VTE Risk Mitigation (From admission, onward)         Ordered     enoxaparin injection 40 mg  Daily         03/13/22 2246     IP VTE HIGH RISK PATIENT  Once          03/13/22 2246     Place sequential compression device  Until discontinued         03/13/22 2246              I have assessed these finding virtually using telemed platform and with assistance of bedside nurse     The attending portion of this evaluation, treatment, and documentation was performed per Janice Schultz MD via Telemedicine AudioVisual using the secure [employer] software platform with 2 way audio/video. The provider was located off-site and the patient is located in the hospital. The aforementioned video software was utilized to document the relevant history and physical exam    Janice Schultz MD  Department of Hospital Medicine   [facility]

## 2022-03-23 PROCEDURE — 25000003 PHARM REV CODE 250: Performed by: INTERNAL MEDICINE

## 2022-03-23 PROCEDURE — 94761 N-INVAS EAR/PLS OXIMETRY MLT: CPT

## 2022-03-23 PROCEDURE — 25000003 PHARM REV CODE 250: Performed by: NURSE PRACTITIONER

## 2022-03-23 PROCEDURE — 25000242 PHARM REV CODE 250 ALT 637 W/ HCPCS: Performed by: PHYSICIAN ASSISTANT

## 2022-03-23 PROCEDURE — 94640 AIRWAY INHALATION TREATMENT: CPT

## 2022-03-23 PROCEDURE — S4991 NICOTINE PATCH NONLEGEND: HCPCS | Performed by: INTERNAL MEDICINE

## 2022-03-23 PROCEDURE — 99900035 HC TECH TIME PER 15 MIN (STAT)

## 2022-03-23 PROCEDURE — 27000221 HC OXYGEN, UP TO 24 HOURS

## 2022-03-23 PROCEDURE — 99232 SBSQ HOSP IP/OBS MODERATE 35: CPT | Mod: 95,,, | Performed by: INTERNAL MEDICINE

## 2022-03-23 PROCEDURE — 94660 CPAP INITIATION&MGMT: CPT

## 2022-03-23 PROCEDURE — 99232 PR SUBSEQUENT HOSPITAL CARE,LEVL II: ICD-10-PCS | Mod: 95,,, | Performed by: INTERNAL MEDICINE

## 2022-03-23 PROCEDURE — 11000001 HC ACUTE MED/SURG PRIVATE ROOM

## 2022-03-23 PROCEDURE — 25000003 PHARM REV CODE 250: Performed by: PHYSICIAN ASSISTANT

## 2022-03-23 RX ORDER — ADHESIVE BANDAGE
30 BANDAGE TOPICAL DAILY PRN
Status: DISCONTINUED | OUTPATIENT
Start: 2022-03-23 | End: 2022-04-01 | Stop reason: HOSPADM

## 2022-03-23 RX ADMIN — NICOTINE 1 PATCH: 14 PATCH, EXTENDED RELEASE TRANSDERMAL at 08:03

## 2022-03-23 RX ADMIN — CARVEDILOL 12.5 MG: 12.5 TABLET, FILM COATED ORAL at 08:03

## 2022-03-23 RX ADMIN — CARVEDILOL 12.5 MG: 12.5 TABLET, FILM COATED ORAL at 04:03

## 2022-03-23 RX ADMIN — IPRATROPIUM BROMIDE AND ALBUTEROL SULFATE 3 ML: 2.5; .5 SOLUTION RESPIRATORY (INHALATION) at 12:03

## 2022-03-23 RX ADMIN — PANTOPRAZOLE SODIUM 40 MG: 40 TABLET, DELAYED RELEASE ORAL at 08:03

## 2022-03-23 RX ADMIN — MAGNESIUM HYDROXIDE 2400 MG: 400 SUSPENSION ORAL at 04:03

## 2022-03-23 RX ADMIN — SUCRALFATE 1 G: 1 TABLET ORAL at 11:03

## 2022-03-23 RX ADMIN — IPRATROPIUM BROMIDE AND ALBUTEROL SULFATE 3 ML: 2.5; .5 SOLUTION RESPIRATORY (INHALATION) at 08:03

## 2022-03-23 RX ADMIN — SUCRALFATE 1 G: 1 TABLET ORAL at 08:03

## 2022-03-23 RX ADMIN — SUCRALFATE 1 G: 1 TABLET ORAL at 04:03

## 2022-03-23 RX ADMIN — ACETAMINOPHEN 650 MG: 325 TABLET ORAL at 08:03

## 2022-03-23 RX ADMIN — SUCRALFATE 1 G: 1 TABLET ORAL at 05:03

## 2022-03-23 RX ADMIN — IPRATROPIUM BROMIDE AND ALBUTEROL SULFATE 3 ML: 2.5; .5 SOLUTION RESPIRATORY (INHALATION) at 01:03

## 2022-03-23 RX ADMIN — IPRATROPIUM BROMIDE AND ALBUTEROL SULFATE 3 ML: 2.5; .5 SOLUTION RESPIRATORY (INHALATION) at 07:03

## 2022-03-23 RX ADMIN — FUROSEMIDE 20 MG: 20 TABLET ORAL at 08:03

## 2022-03-23 NOTE — PLAN OF CARE
Call placed to GI clinic to reschedule patient's appointment with Dr. Parisi and to confirm that his EGD is still scheduled for 4/8. Appointment desk sent message to Dr. Parisi's nurse to call this CM.     14:00PM  CM spoke with  and informed him that [employer] is still waiting on Medicaid to give insurance authorization for his Bipap machine. Patient expressed understanding.    15:15PM  Call received from Kennedi in GI Clinic.She informed this CM that patient will not have to see Dr. Parisi before he can have his EGD performed. MARV then asked for a confirmation on Mr. Mccray's planned EGD and she informed this CM that EGD  will contact patient to schedule EGD. CM inquired if there was any way that we could schedule the EGD prior to patient's discharge as patient needs assistance with scheduling his appointments. Kennedi stated that she would reach out to the EGD  to see if that would be possible. Will continue to follow.    Galina Beasley RN  Ext [phone]

## 2022-03-23 NOTE — PLAN OF CARE
Problem: Adult Inpatient Plan of Care  Goal: Plan of Care Review  Outcome: Ongoing, Progressing  Goal: Patient-Specific Goal (Individualized)  Outcome: Ongoing, Progressing  Goal: Absence of Hospital-Acquired Illness or Injury  Outcome: Ongoing, Progressing  Goal: Optimal Comfort and Wellbeing  Outcome: Ongoing, Progressing  Goal: Readiness for Transition of Care  Outcome: Ongoing, Progressing     Problem: Adult Inpatient Plan of Care  Goal: Patient-Specific Goal (Individualized)  Outcome: Ongoing, Progressing     Problem: Adult Inpatient Plan of Care  Goal: Absence of Hospital-Acquired Illness or Injury  Outcome: Ongoing, Progressing     Problem: Adult Inpatient Plan of Care  Goal: Readiness for Transition of Care  Outcome: Ongoing, Progressing

## 2022-03-23 NOTE — PLAN OF CARE
Plan of care discussed with patient. Patient is free of fall/trauma/injury. Denies CP, SOB, or pain/discomfort. All questions addressed. Still awaiting insurance clearance for home BiPapWill continue to monitor .    Problem: Adult Inpatient Plan of Care  Goal: Plan of Care Review  Outcome: Ongoing, Progressing  Goal: Patient-Specific Goal (Individualized)  Outcome: Ongoing, Progressing  Goal: Absence of Hospital-Acquired Illness or Injury  Outcome: Ongoing, Progressing  Goal: Optimal Comfort and Wellbeing  Outcome: Ongoing, Progressing  Goal: Readiness for Transition of Care  Outcome: Ongoing, Progressing

## 2022-03-23 NOTE — SUBJECTIVE & OBJECTIVE
Telemedicine  This service was provided by Virtual Visit.    Patient was transferred to [facility] Medicine on:  3/17/2022    Chief Complaint   Patient presents with    Shortness of Breath     Pt brought to ED via [facility] from home on CPAP for SOB that started a few hours ago.      The patient location is: 4/4 A   Admitted 3/13/2022  7:17 PM  Present with the patient at the time of the telemed/virtual assessment: Telepresenter    Interval History / Events Overnight:   The patient is able to provide adequate history. Additional history was obtained from past medical records. No significant events reported by Nursing. Patient has required escalation in management of epigastric symptoms over the past few days.  Patient complains of post-prandial abdominal discomfort. Symptoms have improved since yesterday. Associated symptoms include: fatigue. Symptoms are decreasing in severity.     Lab test(s) reviewed: H&H stable    Review of Systems   Constitutional:  Negative for fever.   Respiratory:  Negative for chest tightness.      Objective:     Vital Signs (Most Recent):  Temp: 97.7 °F (36.5 °C) (03/23/22 0758)  Pulse: 84 (03/23/22 0806)  Resp: 20 (03/23/22 0806)  BP: 110/73 (03/23/22 0758)  SpO2: 96 % (03/23/22 0806)   Vital Signs (24h Range):  Temp:  [97 °F (36.1 °C)-99 °F (37.2 °C)] 97.7 °F (36.5 °C)  Pulse:  [81-93] 84  Resp:  [16-21] 20  SpO2:  [91 %-96 %] 96 %  BP: (105-118)/(60-82) 110/73     Weight: 68.4 kg (150 lb 12.7 oz)  Body mass index is 23.62 kg/m².    Intake/Output Summary (Last 24 hours) at 3/23/2022 1041  Last data filed at 3/23/2022 0700  Gross per 24 hour   Intake 422 ml   Output --   Net 422 ml        Physical Exam  Constitutional:       General: He is not in acute distress.     Appearance: Normal appearance.   Eyes:      General: Lids are normal. No scleral icterus.        Right eye: No discharge.         Left eye: No discharge.      Conjunctiva/sclera: Conjunctivae normal.   Neck:      Trachea:  Phonation normal.   Cardiovascular:      Rate and Rhythm: Normal rate.      Comments: Monitor / Vital signs reviewed at time of visit  Pulmonary:      Effort: Pulmonary effort is normal. No tachypnea, accessory muscle usage or respiratory distress.   Abdominal:      General: There is no distension.   Neurological:      Mental Status: He is alert. He is not disoriented.   Psychiatric:         Attention and Perception: Attention normal.         Mood and Affect: Affect normal.         Behavior: Behavior is cooperative.       Significant Labs:    Recent Labs   Lab 02/10/22  0345   HGBA1C 5.7*       Recent Labs   Lab 03/20/22 0445 03/21/22  0235 03/22/22  0300   WBC 8.67 8.78 7.85   HGB 11.9* 12.7* 13.3*   HCT 39.6* 41.2 42.9    260 239       Recent Labs   Lab 03/20/22 0445 03/21/22  0235 03/22/22  0300   GRAN 53.4  4.6 54.2  4.8 52.8  4.2   LYMPH 34.9  3.0 31.1  2.7 30.3  2.4   MONO 8.7  0.8 9.8  0.9 11.5  0.9   EOS 0.2 0.3 0.3       Recent Labs   Lab 03/20/22 0445 03/21/22  0235 03/22/22  0300    140 135*   K 4.3 4.3 4.1    98 97   CO2 31* 33* 30*   BUN 18 20 18   CREATININE 0.9 1.1 0.9   GLU 96 83 88   CALCIUM 8.8 9.1 9.2   ALBUMIN 2.7* 2.8* 2.8*   MG 1.9 1.9 2.0   PHOS 3.0 3.2 2.9       Recent Labs   Lab 03/20/22 0445 03/21/22  0235 03/22/22  0300   ALKPHOS 76 69 65   ALT 24 23 18   AST 12 11 12   PROT 5.7* 6.2 6.3   BILITOT 0.4 0.6 0.7       Recent Labs   Lab 12/21/21  1849 01/28/22  0514 03/06/22  0348   PROCAL  --   --  0.10   DDIMER 0.91* 0.67*  --        SARS-CoV2 (COVID-19) Qualitative PCR (no units)   Date Value   08/19/2021 Not Detected   08/05/2021 Detected (A)   07/29/2021 Not Detected   07/22/2021 Not Detected   06/24/2021 Not Detected   06/17/2021 Not Detected   04/24/2021 Not Detected   04/23/2020 Not Detected   04/21/2020 Not Detected     SARS-CoV-2 RNA, Amplification, Qual (no units)   Date Value   06/10/2021 Negative   07/20/2020 Negative   06/25/2020 Negative    06/04/2020 Negative   05/23/2020 Negative   05/12/2020 Negative   05/06/2020 Negative   04/19/2020 Negative   04/03/2020 Negative     POC Rapid COVID (no units)   Date Value   03/13/2022 Negative   03/05/2022 Negative   02/09/2022 Negative   02/07/2022 Negative   01/28/2022 Negative   01/15/2022 Negative   01/01/2022 Negative   12/21/2021 Negative   06/07/2021 Negative   06/03/2021 Negative       ECG Results              EKG 12-lead (Final result)  Result time 03/14/22 16:40:40      Final result by Interface, Lab In [facility] (03/14/22 16:40:40)                   Narrative:    Test Reason : R06.02,    Vent. Rate : 102 BPM     Atrial Rate : 102 BPM     P-R Int : 160 ms          QRS Dur : 086 ms      QT Int : 354 ms       P-R-T Axes : 083 079 085 degrees     QTc Int : 461 ms    Sinus tachycardia  Right atrial enlargement  Borderline Abnormal ECG  When compared with ECG of 05-MAR-2022 15:30,  No significant change was found  Confirmed by Bryan DOBBINS MD ([id number]) on 3/14/2022 3:41:33 PM    Referred By: [email or web address]   SELF           Confirmed By:Bryan DOBBINS MD                                    Results for orders placed during the hospital encounter of 01/28/22    Echo    Interpretation Summary  · The left ventricle is normal in size with normal systolic function.  · Mild left atrial enlargement.  · There is pulmonary hypertension.  · The estimated ejection fraction is 55%.  · Grade I left ventricular diastolic dysfunction.  · Study quality was technically difficult  · Normal right ventricular size with normal right ventricular systolic function.  · Normal central venous pressure (3 mmHg).  · The estimated PA systolic pressure is 50 mmHg.  · Interatrial septal aneurysm  · Mild tricuspid regurgitation.      X-Ray Chest AP Portable  Narrative: EXAMINATION:  XR CHEST AP PORTABLE    CLINICAL HISTORY:  Shortness of breath    TECHNIQUE:  Single frontal view of the chest was performed.    COMPARISON:  Chest radiograph  03/05/2022    FINDINGS:  Monitoring leads and tubing overlie the chest.    Cardiomediastinal silhouette is midline and stable.  Pulmonary vasculature and hilar contours are unchanged.  Few scattered linear opacities throughout the lungs consistent with minimal platelike scarring versus atelectasis.  Bilateral diffuse nonspecific interstitial coarsening with a perihilar and upper lobe predominance slightly more so on the left, with central peribronchial cuffing and overall increased from 03/05/2022 study.  The lungs are otherwise symmetrically hyperexpanded without large consolidation, pleural effusion or pneumothorax.  No acute osseous process seen.  Impression: Left greater than right perihilar and upper lung zone predominant interval increased nonspecific interstitial coarsening which can be seen with worsening interstitial type pneumonia from inflammatory or infectious process including atypical bacterial or viral etiology versus more atypical distribution of pulmonary edema.  No large consolidation.    Electronically signed by: Mckinley Talbert MD  Date:    03/13/2022  Time:    19:56      Labs and Imaging within the last 24 hours listed above were reviewed.       Diet: Diet Cardiac [facility]  Diet Cardiac  Significant LDAs:   IV Access Type: Peripheral  Urinary Catheter Indication if present: Patient Does Not Have Urinary Catheter  Other Lines/Tubes/Drains:    HIGH RISK CONDITION(S):   Patient has a condition that poses threat to life and bodily function: Severe Respiratory Distress     Goals of Care:    Previous admission:  3/5/22  Likely prognosis:  Fair  Code Status: Full Code  Comfort Only: No  Hospice: No  Goals at discharge: remain at home, with physician follow-up    Discharge Planning   RUTH: 3/23/2022     Code Status: Full Code   Is the patient medically ready for discharge?: Yes    Reason for patient still in hospital (select all that apply): Patient trending condition and Other (specify) awaiting  BiPAP  Discharge Plan A: Home   Discharge Delays: (!) Home Medical Equipment (Insurance, Delivery)

## 2022-03-24 ENCOUNTER — TELEPHONE (OUTPATIENT)
Dept: ENDOSCOPY | Facility: HOSPITAL | Age: 62
End: 2022-03-24
Payer: MEDICAID

## 2022-03-24 ENCOUNTER — TELEPHONE (OUTPATIENT)
Dept: PULMONOLOGY | Facility: CLINIC | Age: 62
End: 2022-03-24
Payer: MEDICAID

## 2022-03-24 LAB
ALBUMIN SERPL BCP-MCNC: 2.6 G/DL (ref 3.5–5.2)
ANION GAP SERPL CALC-SCNC: 8 MMOL/L (ref 8–16)
BUN SERPL-MCNC: 18 MG/DL (ref 6–20)
CALCIUM SERPL-MCNC: 8.9 MG/DL (ref 8.7–10.5)
CHLORIDE SERPL-SCNC: 98 MMOL/L (ref 95–110)
CO2 SERPL-SCNC: 31 MMOL/L (ref 23–29)
CREAT SERPL-MCNC: 0.9 MG/DL (ref 0.5–1.4)
EST. GFR  (AFRICAN AMERICAN): >60 ML/MIN/1.73 M^2
EST. GFR  (NON AFRICAN AMERICAN): >60 ML/MIN/1.73 M^2
GLUCOSE SERPL-MCNC: 91 MG/DL (ref 70–110)
PHOSPHATE SERPL-MCNC: 2.7 MG/DL (ref 2.7–4.5)
POTASSIUM SERPL-SCNC: 4.1 MMOL/L (ref 3.5–5.1)
SODIUM SERPL-SCNC: 137 MMOL/L (ref 136–145)

## 2022-03-24 PROCEDURE — 99232 PR SUBSEQUENT HOSPITAL CARE,LEVL II: ICD-10-PCS | Mod: 95,,, | Performed by: INTERNAL MEDICINE

## 2022-03-24 PROCEDURE — 25000003 PHARM REV CODE 250: Performed by: INTERNAL MEDICINE

## 2022-03-24 PROCEDURE — 25000003 PHARM REV CODE 250: Performed by: PHYSICIAN ASSISTANT

## 2022-03-24 PROCEDURE — 99232 SBSQ HOSP IP/OBS MODERATE 35: CPT | Mod: 95,,, | Performed by: INTERNAL MEDICINE

## 2022-03-24 PROCEDURE — 27000221 HC OXYGEN, UP TO 24 HOURS

## 2022-03-24 PROCEDURE — 25000242 PHARM REV CODE 250 ALT 637 W/ HCPCS: Performed by: PHYSICIAN ASSISTANT

## 2022-03-24 PROCEDURE — 99900035 HC TECH TIME PER 15 MIN (STAT)

## 2022-03-24 PROCEDURE — 11000001 HC ACUTE MED/SURG PRIVATE ROOM

## 2022-03-24 PROCEDURE — 25000003 PHARM REV CODE 250: Performed by: NURSE PRACTITIONER

## 2022-03-24 PROCEDURE — 94660 CPAP INITIATION&MGMT: CPT

## 2022-03-24 PROCEDURE — 27100171 HC OXYGEN HIGH FLOW UP TO 24 HOURS

## 2022-03-24 PROCEDURE — 94640 AIRWAY INHALATION TREATMENT: CPT

## 2022-03-24 PROCEDURE — 36415 COLL VENOUS BLD VENIPUNCTURE: CPT | Performed by: INTERNAL MEDICINE

## 2022-03-24 PROCEDURE — 80069 RENAL FUNCTION PANEL: CPT | Performed by: INTERNAL MEDICINE

## 2022-03-24 PROCEDURE — S4991 NICOTINE PATCH NONLEGEND: HCPCS | Performed by: INTERNAL MEDICINE

## 2022-03-24 PROCEDURE — 94761 N-INVAS EAR/PLS OXIMETRY MLT: CPT

## 2022-03-24 RX ADMIN — SUCRALFATE 1 G: 1 TABLET ORAL at 11:03

## 2022-03-24 RX ADMIN — IPRATROPIUM BROMIDE AND ALBUTEROL SULFATE 3 ML: 2.5; .5 SOLUTION RESPIRATORY (INHALATION) at 02:03

## 2022-03-24 RX ADMIN — PANTOPRAZOLE SODIUM 40 MG: 40 TABLET, DELAYED RELEASE ORAL at 08:03

## 2022-03-24 RX ADMIN — NICOTINE 1 PATCH: 14 PATCH, EXTENDED RELEASE TRANSDERMAL at 08:03

## 2022-03-24 RX ADMIN — CARVEDILOL 12.5 MG: 12.5 TABLET, FILM COATED ORAL at 04:03

## 2022-03-24 RX ADMIN — IPRATROPIUM BROMIDE AND ALBUTEROL SULFATE 3 ML: 2.5; .5 SOLUTION RESPIRATORY (INHALATION) at 12:03

## 2022-03-24 RX ADMIN — IPRATROPIUM BROMIDE AND ALBUTEROL SULFATE 3 ML: 2.5; .5 SOLUTION RESPIRATORY (INHALATION) at 07:03

## 2022-03-24 RX ADMIN — SUCRALFATE 1 G: 1 TABLET ORAL at 04:03

## 2022-03-24 RX ADMIN — CARVEDILOL 12.5 MG: 12.5 TABLET, FILM COATED ORAL at 08:03

## 2022-03-24 RX ADMIN — SUCRALFATE 1 G: 1 TABLET ORAL at 08:03

## 2022-03-24 RX ADMIN — ACETAMINOPHEN 650 MG: 325 TABLET ORAL at 08:03

## 2022-03-24 RX ADMIN — FUROSEMIDE 20 MG: 20 TABLET ORAL at 08:03

## 2022-03-24 RX ADMIN — IPRATROPIUM BROMIDE AND ALBUTEROL SULFATE 3 ML: 2.5; .5 SOLUTION RESPIRATORY (INHALATION) at 08:03

## 2022-03-24 RX ADMIN — SUCRALFATE 1 G: 1 TABLET ORAL at 06:03

## 2022-03-24 NOTE — PLAN OF CARE
Call received from Chalo in EGD scheduling informing this CM that patient will need to be cleared by Pulmonology before he can proceed with his EGD. Dr. Espino notified of above conversation. Patient is scheduled to see Dr. Byrd in Pulmonology on 5/18. He has been added to the wait list if any appointment becomes available sooner.    Galina Beasley RN  Ext [phone]

## 2022-03-24 NOTE — TELEPHONE ENCOUNTER
Hey,     I cannot clear this patient.   I have not followed with him in months.   Looks to have had several ER visitation for respiratory issues.   Pt will need to follow up with a pulmonary MD.     Thanks.

## 2022-03-24 NOTE — PLAN OF CARE
Problem: Adult Inpatient Plan of Care  Goal: Plan of Care Review  Outcome: Ongoing, Progressing  Goal: Patient-Specific Goal (Individualized)  Outcome: Ongoing, Progressing  Goal: Absence of Hospital-Acquired Illness or Injury  Outcome: Ongoing, Progressing  Goal: Optimal Comfort and Wellbeing  Outcome: Ongoing, Progressing  Goal: Readiness for Transition of Care  Outcome: Ongoing, Progressing     Problem: Adult Inpatient Plan of Care  Goal: Patient-Specific Goal (Individualized)  Outcome: Ongoing, Progressing     Problem: Adult Inpatient Plan of Care  Goal: Absence of Hospital-Acquired Illness or Injury  Outcome: Ongoing, Progressing     Problem: Adult Inpatient Plan of Care  Goal: Optimal Comfort and Wellbeing  Outcome: Ongoing, Progressing     Problem: Adult Inpatient Plan of Care  Goal: Readiness for Transition of Care  Outcome: Ongoing, Progressing

## 2022-03-24 NOTE — ASSESSMENT & PLAN NOTE
With history or gastric ulcer  Continued PPI. Added TUMS and Carafate 3/20  Persisting - trial of GI cocktail x1 effective; need to reschedule appointment with GI for repeat EGD as soon as possible

## 2022-03-24 NOTE — TELEPHONE ENCOUNTER
Edda Harmon NP  to Me         3/24/22 9:53 AM  Note  Hey,      I cannot clear this patient.   I have not followed with him in months.   Looks to have had several ER visitation for respiratory issues.   Pt will need to follow up with a pulmonary MD.      Thanks.                    3/24/22 9:40 AM  You routed this conversation to Edda Harmon NP        Me         3/24/22 9:39 AM  Note  Patient has an order for an EGD.  Is patient cleared from Pulmonology standpoint to have procedure?  Please advise.  Thank you.        Leigh Ann

## 2022-03-24 NOTE — PLAN OF CARE
"High Risk/Readmission Patient    · Outpatient Pharmacy has worked with patient on medication education - see note:    Wed 3/16/2022 4:39 PM  Hi Ms. Gibbons,     So I wanted to update you on Mr. Woodard. I went upstairs to  the patient on the medications he received from bedside delivery. These are some of the things I came across in our discussion:     1. Breztri - This is his maintenance inhaler. After speaking with the patient, he stated he was using it as need basis when he was short of breath. And when he was short of breath he was using it every 4-6 hours. I counselled him on the importance of taking this medication twice daily every day and not as need basis. I also informed him he has to take it every morning and every evening daily and rinse his mouth after each use.   2. Spiriva Handihaler - He states he uses this medication once daily. After our discussion, it seemed as though he was having difficulty using the actual device as he kept stating "I don't feel or sense any medication going into my mouth". The handihaler requires using a capsule in the actual device that he has to place and remove after use. I requested Dr. Marilee Jackson to change the Handihaler to Spiriva Respimat (which is a similar device to his other inhalers) so that he does not experience difficulty in using the device. She agreed and will sent in a new prescription for the patient. We are currently working on the prior authorization for the medication so that he can receive it before he is discharged.   3. Albuterol - This is his rescue inhaler. I stressed the importance of using this medication as need basis and NOT the Breztri. I explained that when he is short of breath, he will need to use this inhaler every 4-6 hours until his shortness of breath has resolved.   4. I had the patient explain everything that was discussed back to me in order to ensure that he had an understanding of how to use the inhalers properly.   5. I also " told the patient that I will follow up with him once he is discharged on 3/22 and set up a date that same week where he can come into the pharmacy and we can go over all his medications (especially his inhalers) in person so that he can show me how he is using the medication.     I hope this helps! I will keep you updated as I go along. Thank you!    Chayo Duenas, Pharm D.  Prior Authorization Department [facility] and [address] [address]  Ph: [phone]; Fax: [phone]      · Efren w/ Outpatient Pharmacy emailed 3/24 that patient still in house and discharge is pending home Bipap - updated him on change in Pulm appt and expressed appreciation for efforts in working w/ patient on medication education post discharge.    · Pt's prior Pulm clinic appt was 5/18 - contacted Pulm clinic and had appt changed to:     Sherif Valdovinos - [facility]monary [facility]th Fl Pulmonology   [phone]   [address] [place] [phone]   Next Steps: Follow up on 4/22/2022 Friday 4/22 @ 4pm with Dr Shane Gibbons, DINON, RN, [facility]  Transition Care Manager  [phone] or [phone]

## 2022-03-24 NOTE — TELEPHONE ENCOUNTER
Spoke with Nurse Michael in regards to patient. I advised Nurse Michael that Dr Byrd is not in clinic until the week of May 16th but however, I can schedule patient appointment with another provider. Nurse Michael verbalized that she understand and states that she thinks patient needs a sooner appointment with another provider. I verbalized to Nurse Michael that I understand and advised Nurse Michael that I will schedule appointment with Dr Pollack on 4/8/22 for 4:00pm. Nurse Michael verbalized that she understand and excepted patient appointment.

## 2022-03-24 NOTE — TELEPHONE ENCOUNTER
Case management nurse Galina reached out to the endoscopy schedulers to get patient scheduled for EGD that is ordered. Patient is currently in the hospital. Patient is seen by Pulmonology with last visit on 7/15/21 with follow up in 8 weeks. Follow up was not completed. Pulmonology clearance requested and denied due to patient having multiple ER visits for respiratory problems and no recent pulmonology visit-see telephone encounter 3/24/2022. Patient does have a pulmonology appointment scheduled for 5/18/2022.  Galina notified that patient needs to be cleared by pulmonology before EGD can be scheduled. Galina stated she will inform the patient's physicians.

## 2022-03-24 NOTE — PLAN OF CARE
Problem: Adult Inpatient Plan of Care  Goal: Plan of Care Review  Outcome: Ongoing, Progressing     Problem: Adult Inpatient Plan of Care  Goal: Absence of Hospital-Acquired Illness or Injury  Outcome: Ongoing, Progressing     Problem: Adult Inpatient Plan of Care  Goal: Readiness for Transition of Care  Outcome: Ongoing, Progressing   Pt is A,A,O x 4 . Stable V/S. No C/O pain during the day . Pt ambulated in the room independently. No falls or injuries per day . Still on 1 L N/C . Pt still waiting for authorization for BiPAP machine.

## 2022-03-24 NOTE — TELEPHONE ENCOUNTER
Patient has an order for an EGD.  Is patient cleared from Pulmonology standpoint to have procedure?  Please advise.  Thank you.       Leigh Ann

## 2022-03-24 NOTE — PROGRESS NOTES
[facility] - [facility] Medicine  Telemedicine Progress Note    Patient Name: Baltazar Mccray  MRN: [id number]  Patient Class: IP- Inpatient   Admission Date: 3/13/2022  Length of Stay: 10 days  Attending Physician: Janice Schultz MD  Primary Care Provider: Jaden      Subjective:     Principal Problem:Acute on chronic respiratory failure with hypercapnia    HPI:  Mei Mccray 61yoM w/PMHx of CHF, COPD, HTN, AFib who presented to [facility] w/ shortness of breath 03/13.  Per EMS, patient had been feeling short of breath all day.  He was found to be hypoxic with increased work of breathing. En route to ED, he received 1 DuoNeb, Solu-Medrol 125mg, magnesium 2 g and 2.5 of Versed for acute agitation.  He was placed on CPAP.     Upon arrival to ED,  patient was tachypneic to 30s and hypertensive 150s/60s. He was transitioned from CPAP to BIPAP. Labs notable for WBC 12.8, Hg 12.5, Bicarb 34, , VBG 7.209/PCO2 104/PO2 27/PHCO3 41/BE 14+- while on Bipap. CXR w/interstitial coarsening- interstitial pneumonia vs.pulm edema. Admitted to MICU due to need for continuous bipap.         Overview/Hospital Course:  ICU Course:  Patient was started on azithro/rocephin for suspected CAP/ treatment for COPD exacerbation. Continued on prednisone 40mg qday. Breathing treatments set at every 6 hours. Patient was weaned to bipap at nighttime and w/naps. On 3L BNC. Most recent VBG w/pH 7.37/pCO2 30/pO2 30/pHCO3 43/BE 18. Deemed stable for transfer to hospital medicine 03/14.    Hospital Medicine Course:  Patient officially transferred to hospital medicine service 03/16. He was doing well- on home oxygen settings. Repeat ECG with normal Qtc. Patient was transitioned to levofloxacin to complete 5d course. Sent script to complete 5d of prednisone total. Outpatient follow-up scheduled w/PCP and pulmonology. Patient was stable for discharge to home 03/16, but had delays in arranging home bipap. Awaiting approval from  insurance company.       Telemedicine  This service was provided by Virtual Visit.    Patient was transferred to [facility] Medicine on:  3/17/2022    Chief Complaint   Patient presents with    Shortness of Breath     Pt brought to ED via NOHD from home on CPAP for SOB that started a few hours ago.      The patient location is: 4/4 A   Admitted 3/13/2022  7:17 PM  Present with the patient at the time of the telemed/virtual assessment: Telepresenter    Interval History / Events Overnight:   The patient is able to provide adequate history. Additional history was obtained from past medical records. No significant events reported by Nursing. Patient has required escalation in management of epigastric symptoms over the past few days.  Patient complains of post-prandial abdominal discomfort. Symptoms have improved since yesterday. Associated symptoms include: fatigue. Symptoms are decreasing in severity.     Lab test(s) reviewed: H&H stable    Review of Systems   Constitutional:  Negative for fever.   Respiratory:  Negative for chest tightness.      Objective:     Vital Signs (Most Recent):  Temp: 97.7 °F (36.5 °C) (03/23/22 0758)  Pulse: 84 (03/23/22 0806)  Resp: 20 (03/23/22 0806)  BP: 110/73 (03/23/22 0758)  SpO2: 96 % (03/23/22 0806)   Vital Signs (24h Range):  Temp:  [97 °F (36.1 °C)-99 °F (37.2 °C)] 97.7 °F (36.5 °C)  Pulse:  [81-93] 84  Resp:  [16-21] 20  SpO2:  [91 %-96 %] 96 %  BP: (105-118)/(60-82) 110/73     Weight: 68.4 kg (150 lb 12.7 oz)  Body mass index is 23.62 kg/m².    Intake/Output Summary (Last 24 hours) at 3/23/2022 1041  Last data filed at 3/23/2022 0700  Gross per 24 hour   Intake 422 ml   Output --   Net 422 ml        Physical Exam  Constitutional:       General: He is not in acute distress.     Appearance: Normal appearance.   Eyes:      General: Lids are normal. No scleral icterus.        Right eye: No discharge.         Left eye: No discharge.      Conjunctiva/sclera: Conjunctivae  normal.   Neck:      Trachea: Phonation normal.   Cardiovascular:      Rate and Rhythm: Normal rate.      Comments: Monitor / Vital signs reviewed at time of visit  Pulmonary:      Effort: Pulmonary effort is normal. No tachypnea, accessory muscle usage or respiratory distress.   Abdominal:      General: There is no distension.   Neurological:      Mental Status: He is alert. He is not disoriented.   Psychiatric:         Attention and Perception: Attention normal.         Mood and Affect: Affect normal.         Behavior: Behavior is cooperative.       Significant Labs:    Recent Labs   Lab 02/10/22  0345   HGBA1C 5.7*       Recent Labs   Lab 03/20/22 0445 03/21/22  0235 03/22/22  0300   WBC 8.67 8.78 7.85   HGB 11.9* 12.7* 13.3*   HCT 39.6* 41.2 42.9    260 239       Recent Labs   Lab 03/20/22 0445 03/21/22  0235 03/22/22  0300   GRAN 53.4  4.6 54.2  4.8 52.8  4.2   LYMPH 34.9  3.0 31.1  2.7 30.3  2.4   MONO 8.7  0.8 9.8  0.9 11.5  0.9   EOS 0.2 0.3 0.3       Recent Labs   Lab 03/20/22 0445 03/21/22  0235 03/22/22  0300    140 135*   K 4.3 4.3 4.1    98 97   CO2 31* 33* 30*   BUN 18 20 18   CREATININE 0.9 1.1 0.9   GLU 96 83 88   CALCIUM 8.8 9.1 9.2   ALBUMIN 2.7* 2.8* 2.8*   MG 1.9 1.9 2.0   PHOS 3.0 3.2 2.9       Recent Labs   Lab 03/20/22 0445 03/21/22  0235 03/22/22  0300   ALKPHOS 76 69 65   ALT 24 23 18   AST 12 11 12   PROT 5.7* 6.2 6.3   BILITOT 0.4 0.6 0.7       Recent Labs   Lab 12/21/21  1849 01/28/22  0514 03/06/22  0348   PROCAL  --   --  0.10   DDIMER 0.91* 0.67*  --        SARS-CoV2 (COVID-19) Qualitative PCR (no units)   Date Value   08/19/2021 Not Detected   08/05/2021 Detected (A)   07/29/2021 Not Detected   07/22/2021 Not Detected   06/24/2021 Not Detected   06/17/2021 Not Detected   04/24/2021 Not Detected   04/23/2020 Not Detected   04/21/2020 Not Detected     SARS-CoV-2 RNA, Amplification, Qual (no units)   Date Value   06/10/2021 Negative   07/20/2020 Negative    06/25/2020 Negative   06/04/2020 Negative   05/23/2020 Negative   05/12/2020 Negative   05/06/2020 Negative   04/19/2020 Negative   04/03/2020 Negative     POC Rapid COVID (no units)   Date Value   03/13/2022 Negative   03/05/2022 Negative   02/09/2022 Negative   02/07/2022 Negative   01/28/2022 Negative   01/15/2022 Negative   01/01/2022 Negative   12/21/2021 Negative   06/07/2021 Negative   06/03/2021 Negative       ECG Results              EKG 12-lead (Final result)  Result time 03/14/22 16:40:40      Final result by Interface, Lab In [facility] (03/14/22 16:40:40)                   Narrative:    Test Reason : R06.02,    Vent. Rate : 102 BPM     Atrial Rate : 102 BPM     P-R Int : 160 ms          QRS Dur : 086 ms      QT Int : 354 ms       P-R-T Axes : 083 079 085 degrees     QTc Int : 461 ms    Sinus tachycardia  Right atrial enlargement  Borderline Abnormal ECG  When compared with ECG of 05-MAR-2022 15:30,  No significant change was found  Confirmed by Bryan DOBBINS MD ([id number]) on 3/14/2022 3:41:33 PM    Referred By: [email or web address]   SELF           Confirmed By:Bryan DOBBINS MD                                    Results for orders placed during the hospital encounter of 01/28/22    Echo    Interpretation Summary  · The left ventricle is normal in size with normal systolic function.  · Mild left atrial enlargement.  · There is pulmonary hypertension.  · The estimated ejection fraction is 55%.  · Grade I left ventricular diastolic dysfunction.  · Study quality was technically difficult  · Normal right ventricular size with normal right ventricular systolic function.  · Normal central venous pressure (3 mmHg).  · The estimated PA systolic pressure is 50 mmHg.  · Interatrial septal aneurysm  · Mild tricuspid regurgitation.      X-Ray Chest AP Portable  Narrative: EXAMINATION:  XR CHEST AP PORTABLE    CLINICAL HISTORY:  Shortness of breath    TECHNIQUE:  Single frontal view of the chest was performed.    COMPARISON:  Chest  radiograph 03/05/2022    FINDINGS:  Monitoring leads and tubing overlie the chest.    Cardiomediastinal silhouette is midline and stable.  Pulmonary vasculature and hilar contours are unchanged.  Few scattered linear opacities throughout the lungs consistent with minimal platelike scarring versus atelectasis.  Bilateral diffuse nonspecific interstitial coarsening with a perihilar and upper lobe predominance slightly more so on the left, with central peribronchial cuffing and overall increased from 03/05/2022 study.  The lungs are otherwise symmetrically hyperexpanded without large consolidation, pleural effusion or pneumothorax.  No acute osseous process seen.  Impression: Left greater than right perihilar and upper lung zone predominant interval increased nonspecific interstitial coarsening which can be seen with worsening interstitial type pneumonia from inflammatory or infectious process including atypical bacterial or viral etiology versus more atypical distribution of pulmonary edema.  No large consolidation.    Electronically signed by: Mckinley Talbert MD  Date:    03/13/2022  Time:    19:56      Labs and Imaging within the last 24 hours listed above were reviewed.       Diet: Diet Cardiac [facility]  Diet Cardiac  Significant LDAs:   IV Access Type: Peripheral  Urinary Catheter Indication if present: Patient Does Not Have Urinary Catheter  Other Lines/Tubes/Drains:    HIGH RISK CONDITION(S):   Patient has a condition that poses threat to life and bodily function: Severe Respiratory Distress     Goals of Care:    Previous admission:  3/5/22  Likely prognosis:  Fair  Code Status: Full Code  Comfort Only: No  Hospice: No  Goals at discharge: remain at home, with physician follow-up    Discharge Planning   RUTH: 3/23/2022     Code Status: Full Code   Is the patient medically ready for discharge?: Yes    Reason for patient still in hospital (select all that apply): Patient trending condition and Other (specify)  awaiting BiPAP  Discharge Plan A: Home   Discharge Delays: (!) Home Medical Equipment (Insurance, Delivery)      Assessment/Plan:      * Acute on chronic respiratory failure with hypercapnia  Uses home O2 at 2 - 3 LPM  Needs BiPAP - awaiting Medicaid approval  Doing better on 1 LPM O2    BiPAP (biphasic positive airway pressure) dependence  Awaiting insurance approval for BiPAP    Epigastric pain  With history or gastric ulcer  Continued PPI. Added TUMS and Carafate 3/20  Persisting - trial of GI cocktail x1 effective; need to reschedule appointment with GI for repeat EGD as soon as possible    Chronic diastolic heart failure  Control BP; continuing home medications  Monitor    Chronic obstructive pulmonary disease  Patient having difficulty using his home inhalers - PharmD assistance appreciated.    Tobacco abuse  - Cessation counseling  - Nicotine patch     Essential hypertension  - Continue home Coreg and Lasix    COPD exacerbation  Wean O2 as tolerated to keep O2 saturations >88%  -  Repeat ECG without prolonged QTc- stopped Rocephin and azithro and transition to levaquin to complete course  - Continued prednisone to complete course  - Scheduled duonebs q 6 hrs        Active Hospital Problems    Diagnosis  POA    *Acute on chronic respiratory failure with hypercapnia [J96.22]  Yes    BiPAP (biphasic positive airway pressure) dependence [Z99.89]  Not Applicable    Epigastric pain [R10.13]  Yes    Chronic diastolic heart failure [I50.32]  Yes    Chronic obstructive pulmonary disease [J44.9]  Yes     Chronic     Per my interpretation spirometry shows significant obstruction-FEV1 28% of predicted, moderate restriction and significantly decreased DLCO.     MMRC 3-4 symptoms of dyspnea-has home oxygen  Greater than 10 COPD exacerbations within 1 year;  Had recent exacerbation few weeks ago.  Prescribe prednisone and antibiotics seems to be better.  Concerns for noncompliance with inhaler regimen  Approximately to  hospitalizations for COPD within the last 1 year  Notes to have a chronic cough  Significant smoking history  History of crack cocaine use quit 20 years  History of working at  [employer] -in the 90s exposed to black sand  Now reporting better living arrangements at Nursing home.       Has failed previous inhaler therapy (advair and Spiriva)   Also, discussed pulmonary rehabilitation   May be considered for advanced lung follow-up- he patient is 61 years old.    Explains he needs to remain smoke free for approximately 6 months    -Encouraged complete smoking cessation. I have referred you to the smoking cessation program.   -Start Breztri. Rinse mouth after use.   -Referal to pulm rehab placed- patient interested.   -Continue albuterol 2 puffs every 4-6 hours as needed for shortness of breath or wheezing.   - Please obtain CT of chest in relation to previous abnormal CT - for follow up. Would recommend annual low dose CT screening in relation to your smoking history.   -Please make sure pneumonia vaccine is up to date. Thinks could have obtained the pneumonia vaccine. Would like to follow up with daughters of adelita and [employer].   -Encourage COVID vaccine.   -Continue home oxygen as prescribed.   -Follow up in 8 weeks or sooner if needed.       Tobacco abuse [Z72.0]  Yes     Chronic    Essential hypertension [I10]  Yes     Chronic    COPD exacerbation [J44.1]  Yes      Resolved Hospital Problems   No resolved problems to display.       Inpatient Medications Prescribed for Management of Current Problems:     Scheduled Meds:    albuterol-ipratropium  3 mL Nebulization Q6H    carvediloL  12.5 mg Oral BID WM    enoxaparin  40 mg Subcutaneous Daily    furosemide  20 mg Oral Daily    nicotine  1 patch Transdermal Daily    pantoprazole  40 mg Oral BID    sucralfate  1 g Oral QID (AC & HS)     Continuous Infusions:   As Needed: acetaminophen, calcium carbonate, cloNIDine, hydrALAZINE, influenza, magnesium  hydroxide 400 mg/5 ml, simethicone, sodium chloride 0.9%    VTE Risk Mitigation (From admission, onward)         Ordered     enoxaparin injection 40 mg  Daily         03/13/22 2246     IP VTE HIGH RISK PATIENT  Once         03/13/22 2246     Place sequential compression device  Until discontinued         03/13/22 2246                I have assessed these finding virtually using telemed platform and with assistance of bedside nurse     The attending portion of this evaluation, treatment, and documentation was performed per Janice Schultz MD via Telemedicine AudioVisual using the secure HistoryFile software platform with 2 way audio/video. The provider was located off-site and the patient is located in the hospital. The aforementioned video software was utilized to document the relevant history and physical exam    Janice Schultz MD  Department of Hospital Medicine   [facility]

## 2022-03-24 NOTE — TELEPHONE ENCOUNTER
----- Message from Nicolle Monterroso sent at 3/24/2022 10:43 AM CDT -----  Regarding: speak with nurse  Contact: nurse frazier  Ext#[phone]    please call nurse frazier calling from the hospital said patient needs to get in ASAP with the doctor waiting on a call back from the nurse concerning this thanks.

## 2022-03-25 PROBLEM — Z01.811 PRE-PROCEDURAL RESPIRATORY EXAMINATION: Status: ACTIVE | Noted: 2022-03-25

## 2022-03-25 PROCEDURE — 99223 PR INITIAL HOSPITAL CARE,LEVL III: ICD-10-PCS | Mod: ,,, | Performed by: INTERNAL MEDICINE

## 2022-03-25 PROCEDURE — S4991 NICOTINE PATCH NONLEGEND: HCPCS | Performed by: INTERNAL MEDICINE

## 2022-03-25 PROCEDURE — 99900035 HC TECH TIME PER 15 MIN (STAT)

## 2022-03-25 PROCEDURE — 11000001 HC ACUTE MED/SURG PRIVATE ROOM

## 2022-03-25 PROCEDURE — 94760 N-INVAS EAR/PLS OXIMETRY 1: CPT

## 2022-03-25 PROCEDURE — 25000242 PHARM REV CODE 250 ALT 637 W/ HCPCS: Performed by: PHYSICIAN ASSISTANT

## 2022-03-25 PROCEDURE — 25000003 PHARM REV CODE 250: Performed by: INTERNAL MEDICINE

## 2022-03-25 PROCEDURE — 99232 SBSQ HOSP IP/OBS MODERATE 35: CPT | Mod: 95,,, | Performed by: INTERNAL MEDICINE

## 2022-03-25 PROCEDURE — 99232 PR SUBSEQUENT HOSPITAL CARE,LEVL II: ICD-10-PCS | Mod: 95,,, | Performed by: INTERNAL MEDICINE

## 2022-03-25 PROCEDURE — 94660 CPAP INITIATION&MGMT: CPT

## 2022-03-25 PROCEDURE — 94761 N-INVAS EAR/PLS OXIMETRY MLT: CPT

## 2022-03-25 PROCEDURE — 27000221 HC OXYGEN, UP TO 24 HOURS

## 2022-03-25 PROCEDURE — 25000003 PHARM REV CODE 250: Performed by: PHYSICIAN ASSISTANT

## 2022-03-25 PROCEDURE — 94640 AIRWAY INHALATION TREATMENT: CPT

## 2022-03-25 PROCEDURE — 99223 1ST HOSP IP/OBS HIGH 75: CPT | Mod: ,,, | Performed by: INTERNAL MEDICINE

## 2022-03-25 RX ADMIN — NICOTINE 1 PATCH: 14 PATCH, EXTENDED RELEASE TRANSDERMAL at 08:03

## 2022-03-25 RX ADMIN — IPRATROPIUM BROMIDE AND ALBUTEROL SULFATE 3 ML: 2.5; .5 SOLUTION RESPIRATORY (INHALATION) at 12:03

## 2022-03-25 RX ADMIN — CARVEDILOL 12.5 MG: 12.5 TABLET, FILM COATED ORAL at 05:03

## 2022-03-25 RX ADMIN — SUCRALFATE 1 G: 1 TABLET ORAL at 09:03

## 2022-03-25 RX ADMIN — PANTOPRAZOLE SODIUM 40 MG: 40 TABLET, DELAYED RELEASE ORAL at 08:03

## 2022-03-25 RX ADMIN — SUCRALFATE 1 G: 1 TABLET ORAL at 05:03

## 2022-03-25 RX ADMIN — SUCRALFATE 1 G: 1 TABLET ORAL at 06:03

## 2022-03-25 RX ADMIN — IPRATROPIUM BROMIDE AND ALBUTEROL SULFATE 3 ML: 2.5; .5 SOLUTION RESPIRATORY (INHALATION) at 02:03

## 2022-03-25 RX ADMIN — FUROSEMIDE 20 MG: 20 TABLET ORAL at 08:03

## 2022-03-25 RX ADMIN — PANTOPRAZOLE SODIUM 40 MG: 40 TABLET, DELAYED RELEASE ORAL at 09:03

## 2022-03-25 RX ADMIN — CARVEDILOL 12.5 MG: 12.5 TABLET, FILM COATED ORAL at 08:03

## 2022-03-25 RX ADMIN — IPRATROPIUM BROMIDE AND ALBUTEROL SULFATE 3 ML: 2.5; .5 SOLUTION RESPIRATORY (INHALATION) at 07:03

## 2022-03-25 RX ADMIN — SUCRALFATE 1 G: 1 TABLET ORAL at 12:03

## 2022-03-25 NOTE — ASSESSMENT & PLAN NOTE
With history or gastric ulcer  Continued PPI. Added TUMS and Carafate 3/20  Persisting - trial of GI cocktail x1 effective; need to reschedule appointment with GI for repeat EGD as soon as possible - EGD scheduled on 4/8 - patient needs pulmonary clearance prior to EGD and if cannot be scheduled in outpatient clinic, the consult team will assess

## 2022-03-25 NOTE — ASSESSMENT & PLAN NOTE
On home oxygen 3L     -Wean O2 as tolerated to keep O2 saturations >88%  -completed course of levofloxacin  -completed course of prednisone  -Scheduled duonebs q 6h PRN  - continue LABA-ICS (breo ellipta) + LAMA (spiriva) while inpatient    - continue Breztri-atmosphere on discharge as well as spiriva  - has ABG demonstrating awake chronic hypercapnia and thus should be discharged with NIV QHS    Patient urged to adhere to the above treatment regimen, particularly given his upcoming EGD.

## 2022-03-25 NOTE — CONSULTS
[facility] Surg  Pulmonology  Consult Note    Patient Name: Baltazar Mccray  MRN: [id number]  Admission Date: 3/13/2022  Hospital Length of Stay: 12 days  Code Status: Full Code  Attending Physician: Michelle Espino MD  Primary Care Provider: Jaden   Principal Problem: Acute on chronic respiratory failure with hypercapnia    Inpatient consult to Pulmonology  Consult performed by: Maurice Rincon MD  Consult ordered by: Michelle Espino MD        Subjective:     HPI:  Mei Woodard is a 61 year old male with severe COPD (FEV1 29% predicted, 0.86L) on 3L home O2, HTN, HFpEF (EF 55% 2022), CAD, and HTN who presented to [facility] ED on 3/13/2022 for SOB for 24h. Patient was admitted from 3/5-3/7 for a COPD exacerbation. This presentation the patient was admitted to MICU for hypercapnic respiratory failure due to COPD exacerbation not requiring intubation. Patient was eventually stepped down and is awaiting discharge.      Patient is scheduled to undergo EGD on 4/8 and his gastroenterologist is requesting pulmonary clearance/risk stratification for the outpatient procedure. Patient is unlikely to get into pulmonology clinic in time for his procedure and thus pulmonology was consulted inpatient for risk stratification.       Past Medical History:   Diagnosis Date    Asthma     Atrial fibrillation with rapid ventricular response     CHF (congestive heart failure)     COPD (chronic obstructive pulmonary disease)     home O2 at night only    Coronary artery disease     Hypertension     Lung nodule     Pneumonia     Seizures        Past Surgical History:   Procedure Laterality Date    ESOPHAGOGASTRODUODENOSCOPY N/A 2/11/2022    Procedure: EGD (ESOPHAGOGASTRODUODENOSCOPY);  Surgeon: Cain Ortega MD;  Location: [facility] ENDO ([address] [place] [address]);  Service: Endoscopy;  Laterality: N/A;    LEFT HEART CATHETERIZATION  4/23/2020    Procedure: Left heart cath;  Surgeon: Nic Pollack MD;  Location: [facility]  CATH LAB;  Service: Cardiology;;       Review of patient's allergies indicates:   Allergen Reactions    Benazepril Swelling    Duoneb [ipratropium-albuterol]      Report minor Tremors, pt seems to be tolerating well.       Family History       Problem Relation (Age of Onset)    Cancer Father    Hypertension Sister    Stroke Sister, Brother          Tobacco Use    Smoking status: Current Some Day Smoker     Packs/day: 0.25     Types: Cigarettes    Smokeless tobacco: Never Used    Tobacco comment: 1-2 per day   Substance and Sexual Activity    Alcohol use: Yes     Alcohol/week: 2.0 standard drinks     Types: 2 Cans of beer per week     Comment: every night    Drug use: No    Sexual activity: Not Currently         Review of Systems   Constitutional: Negative.  Negative for activity change, appetite change, chills, diaphoresis, fatigue, fever and unexpected weight change.   HENT: Negative.     Respiratory: Negative.  Negative for cough, choking and shortness of breath.    Cardiovascular: Negative.  Negative for chest pain, palpitations and leg swelling.   Gastrointestinal: Negative.  Negative for abdominal distention, diarrhea, nausea and vomiting.   Musculoskeletal: Negative.    Neurological:  Negative for syncope, weakness, light-headedness, numbness and headaches.   Psychiatric/Behavioral: Negative.  Negative for agitation, behavioral problems and confusion.    Objective:     Vital Signs (Most Recent):  Temp: 98.3 °F (36.8 °C) (03/25/22 1216)  Pulse: 72 (03/25/22 1444)  Resp: 16 (03/25/22 1444)  BP: 123/63 (03/25/22 1216)  SpO2: 95 % (03/25/22 1446) Vital Signs (24h Range):  Temp:  [98 °F (36.7 °C)-98.4 °F (36.9 °C)] 98.3 °F (36.8 °C)  Pulse:  [72-90] 72  Resp:  [16-96] 16  SpO2:  [22 %-98 %] 95 %  BP: (101-130)/(62-85) 123/63     Weight: 68.4 kg (150 lb 12.7 oz)  Body mass index is 23.62 kg/m².      Intake/Output Summary (Last 24 hours) at 3/25/2022 1517  Last data filed at 3/25/2022 1200  Gross per 24 hour    Intake 780 ml   Output 350 ml   Net 430 ml       Physical Exam  Vitals and nursing note reviewed.   Constitutional:       General: He is not in acute distress.     Appearance: He is not ill-appearing, toxic-appearing or diaphoretic.   Cardiovascular:      Rate and Rhythm: Normal rate.   Pulmonary:      Effort: Pulmonary effort is normal. No respiratory distress.      Breath sounds: No wheezing or rales.   Abdominal:      General: Abdomen is flat. There is no distension.   Musculoskeletal:      Right lower leg: No edema.      Left lower leg: No edema.   Neurological:      Mental Status: He is alert.       Vents:  Oxygen Concentration (%): 24 (03/25/22 1446)    Lines/Drains/Airways       None                   Significant Labs:    CBC/Anemia Profile:  No results for input(s): WBC, HGB, HCT, PLT, MCV, RDW, IRON, FERRITIN, RETIC, FOLATE, [id number], OCCULTBLOOD in the last 48 hours.     Chemistries:  Recent Labs   Lab 03/24/22  0236      K 4.1   CL 98   CO2 31*   BUN 18   CREATININE 0.9   CALCIUM 8.9   ALBUMIN 2.6*   PHOS 2.7       All pertinent labs within the past 24 hours have been reviewed.    Significant Imaging:   I have reviewed all pertinent imaging results/findings within the past 24 hours.    Assessment/Plan:     * Acute on chronic respiratory failure with hypercapnia  Case management assisting in obtaining NIV for home.     Pre-procedural respiratory examination  Patient has EGD scheduled for 8/4 for follow up of peptic ulcer disease. He has undergone this procedure previously without complication. It is non-emergent.     Patient with ARISCAT score 44, intermediate risk. This equates to a 13.3% risk of in-hospital post-op pulmonary complications (comprising of respiratory failure, respiratory infection, pleural effusion, atelectasis, pneumothorax, bronchospasm treated with bronchodilators or aspiration pneumonitis).     It is important to recognize that this is both an outpatient procedure and  non-surgical in origin, thus the above scoring system is not validated for use in this scenario. Taking into account that an ARISTCAT score does not factor in patient's pulmonary function, of which this patient's pulmonary function is severely reduced, one could conclude that it likely underestimates his risk for pulmonary complications.     Delaying his procedure does not, per the ARISCAT score, change his risk classification. Patient has previously tolerated an EGD without issue. Would recommend an anesthesiologist be present during his procedure given his higher than average risk for pulmonary complications.     Chronic obstructive pulmonary disease  On home oxygen 3L     -Wean O2 as tolerated to keep O2 saturations 88-92%  -completed course of levofloxacin  -completed course of prednisone  -Scheduled duonebs q 6h PRN  - continue LABA-ICS (breo ellipta) + LAMA (spiriva) while inpatient    - continue Breztri-atmosphere on discharge as well as spiriva  - has ABG demonstrating awake chronic hypercapnia and thus should be discharged with NIV QHS    Patient urged to adhere to the above treatment regimen, particularly given his upcoming EGD.         Thank you for your consult. I will sign off. Please contact us if you have any additional questions.     Maurice Rincon MD  Pulmonology  [facility] - [facility] Surg

## 2022-03-25 NOTE — PROGRESS NOTES
[facility] - [facility] Medicine  Telemedicine Progress Note    Patient Name: Baltazar Mccray  MRN: [id number]  Patient Class: IP- Inpatient   Admission Date: 3/13/2022  Length of Stay: 11 days  Attending Physician: Michelle Espino MD  Primary Care Provider: Rhonda Of Katia          Subjective:     Principal Problem:Acute on chronic respiratory failure with hypercapnia        HPI:  Mei Mccray 61yoM w/PMHx of CHF, COPD, HTN, AFib who presented to [facility] w/ shortness of breath 03/13.  Per EMS, patient had been feeling short of breath all day.  He was found to be hypoxic with increased work of breathing. En route to ED, he received 1 DuoNeb, Solu-Medrol 125mg, magnesium 2 g and 2.5 of Versed for acute agitation.  He was placed on CPAP.     Upon arrival to ED,  patient was tachypneic to 30s and hypertensive 150s/60s. He was transitioned from CPAP to BIPAP. Labs notable for WBC 12.8, Hg 12.5, Bicarb 34, , VBG 7.209/PCO2 104/PO2 27/PHCO3 41/BE 14+- while on Bipap. CXR w/interstitial coarsening- interstitial pneumonia vs.pulm edema. Admitted to MICU due to need for continuous bipap.           Overview/Hospital Course:  ICU Course:  Patient was started on azithro/rocephin for suspected CAP/ treatment for COPD exacerbation. Continued on prednisone 40mg qday. Breathing treatments set at every 6 hours. Patient was weaned to bipap at nighttime and w/naps. On 3L BNC. Most recent VBG w/pH 7.37/pCO2 30/pO2 30/pHCO3 43/BE 18. Deemed stable for transfer to hospital medicine 03/14.    Hospital Medicine Course:  Patient officially transferred to hospital medicine service 03/16. He was doing well- on home oxygen settings. Repeat ECG with normal Qtc. Patient was transitioned to levofloxacin to complete 5d course. Sent script to complete 5d of prednisone total. Outpatient follow-up scheduled w/PCP and pulmonology. Patient was stable for discharge to home 03/16, but had delays in arranging home bipap. Awaiting  approval from insurance company.           This encounter was provided through telemedicine.  Patient was transferred to the telemedicine service on:  03/17/2022   The patient location is: [id number]/654 A admitted 3/13/2022  7:17 PM.  Present with the patient at the time of the telemed/virtual assessment: Telepresenter    Interval History/Overnight Events:   Clinical record since admit reviewed.    Patient frustrated over the length of time it is taking to get his BiPAP equipment; he denies shortness of breast says he feels well; emphasized the need to continue using his BiPAP nightly especially when he is feeling well; tolerating BIPAP for 8-9 hours nightly    Review of Systems   Constitutional:  Negative for activity change, fatigue and fever.   Respiratory:  Positive for cough. Negative for shortness of breath.    Cardiovascular:  Negative for leg swelling.   Gastrointestinal:  Negative for abdominal pain and vomiting.      Inpatient Medications:  Scheduled Meds:   albuterol-ipratropium  3 mL Nebulization Q6H    carvediloL  12.5 mg Oral BID WM    enoxaparin  40 mg Subcutaneous Daily    furosemide  20 mg Oral Daily    nicotine  1 patch Transdermal Daily    pantoprazole  40 mg Oral BID    sucralfate  1 g Oral QID (AC & HS)     Continuous Infusions:  PRN Meds:.acetaminophen, calcium carbonate, cloNIDine, hydrALAZINE, influenza, magnesium hydroxide 400 mg/5 ml, simethicone, sodium chloride 0.9%      Objective:     Temp:  [97.5 °F (36.4 °C)-99.4 °F (37.4 °C)] 98.3 °F (36.8 °C)  Pulse:  [78-92] 90  Resp:  [17-26] 26  SpO2:  [89 %-99 %] 95 %  BP: (101-130)/(63-76) 130/70      Intake/Output Summary (Last 24 hours) at 3/24/2022 1941  Last data filed at 3/24/2022 1742  Gross per 24 hour   Intake 1580 ml   Output 300 ml   Net 1280 ml        Body mass index is 23.62 kg/m².    Physical Exam  Vitals and nursing note reviewed.   Constitutional:       General: He is not in acute distress.     Appearance: Normal appearance. He  is normal weight. He is not ill-appearing.   HENT:      Head: Normocephalic and atraumatic.      Right Ear: Hearing normal.      Left Ear: Hearing normal.      Nose: Nose normal.   Eyes:      General: No scleral icterus.        Right eye: No discharge.         Left eye: No discharge.      Extraocular Movements: Extraocular movements intact.   Cardiovascular:      Rate and Rhythm: Normal rate.   Pulmonary:      Effort: Pulmonary effort is normal. No accessory muscle usage or respiratory distress.      Breath sounds: No wheezing (per RN exam).   Skin:     Findings: No rash.   Neurological:      General: No focal deficit present.      Mental Status: He is alert and oriented to person, place, and time.      Cranial Nerves: No cranial nerve deficit.      Motor: No weakness.   Psychiatric:         Attention and Perception: Attention normal.         Mood and Affect: Mood is depressed. Affect is blunt.         Speech: Speech is delayed.         Behavior: Behavior is cooperative.        Labs:  Recent Results (from the past 24 hour(s))   Renal function panel    Collection Time: 03/24/22  2:36 AM   Result Value Ref Range    Glucose 91 70 - 110 mg/dL    Sodium 137 136 - 145 mmol/L    Potassium 4.1 3.5 - 5.1 mmol/L    Chloride 98 95 - 110 mmol/L    CO2 31 (H) 23 - 29 mmol/L    BUN 18 6 - 20 mg/dL    Calcium 8.9 8.7 - 10.5 mg/dL    Creatinine 0.9 0.5 - 1.4 mg/dL    Albumin 2.6 (L) 3.5 - 5.2 g/dL    Phosphorus 2.7 2.7 - 4.5 mg/dL    eGFR if African American >60.0 >60 mL/min/1.73 m^2    eGFR if non African American >60.0 >60 mL/min/1.73 m^2    Anion Gap 8 8 - 16 mmol/L        Lab Results   Component Value Date    [id number] Negative 03/13/2022       Recent Labs   Lab 03/20/22  0445 03/21/22  0235 03/22/22  0300   WBC 8.67 8.78 7.85   LYMPH 34.9  3.0 31.1  2.7 30.3  2.4   HGB 11.9* 12.7* 13.3*   HCT 39.6* 41.2 42.9    260 239     Recent Labs   Lab 03/20/22  0445 03/21/22  0235 03/22/22  0300 03/24/22  0236    140  135* 137   K 4.3 4.3 4.1 4.1    98 97 98   CO2 31* 33* 30* 31*   BUN 18 20 18 18   CREATININE 0.9 1.1 0.9 0.9   GLU 96 83 88 91   CALCIUM 8.8 9.1 9.2 8.9   MG 1.9 1.9 2.0  --    PHOS 3.0 3.2 2.9 2.7     Recent Labs   Lab 03/20/22  0445 03/21/22  0235 03/22/22  0300 03/24/22  0236   ALKPHOS 76 69 65  --    ALT 24 23 18  --    AST 12 11 12  --    ALBUMIN 2.7* 2.8* 2.8* 2.6*   PROT 5.7* 6.2 6.3  --    BILITOT 0.4 0.6 0.7  --         No results for input(s): DDIMER, FERRITIN, CRP, LDH, BNP, TROPONINI, CPK in the last 72 hours.    Invalid input(s): PROCALCITONIN    All labs within the last 24 hours were reviewed.     Microbiology:  Microbiology Results (last 7 days)       Procedure Component Value Units Date/Time    Blood culture #1 **CANNOT BE ORDERED STAT** [[id number]] Collected: 03/13/22 2054    Order Status: Completed Specimen: Blood from Peripheral, Forearm, Right Updated: 03/18/22 2312     Blood Culture, Routine No growth after 5 days.    Blood culture #2 **CANNOT BE ORDERED STAT** [[id number]] Collected: 03/13/22 2054    Order Status: Completed Specimen: Blood from Peripheral, Antecubital, Right Updated: 03/18/22 2312     Blood Culture, Routine No growth after 5 days.              Imaging  ECG Results              EKG 12-lead (Final result)  Result time 03/14/22 16:40:40      Final result by Interface, Lab In [facility] (03/14/22 16:40:40)                   Narrative:    Test Reason : R06.02,    Vent. Rate : 102 BPM     Atrial Rate : 102 BPM     P-R Int : 160 ms          QRS Dur : 086 ms      QT Int : 354 ms       P-R-T Axes : 083 079 085 degrees     QTc Int : 461 ms    Sinus tachycardia  Right atrial enlargement  Borderline Abnormal ECG  When compared with ECG of 05-MAR-2022 15:30,  No significant change was found  Confirmed by LAVIE MD, Bryan ([id number]) on 3/14/2022 3:41:33 PM    Referred By: [email or web address]   SELF           Confirmed By:Bryan DOBBINS MD                                    Results for orders placed during the  hospital encounter of 01/28/22    Echo    Interpretation Summary  · The left ventricle is normal in size with normal systolic function.  · Mild left atrial enlargement.  · There is pulmonary hypertension.  · The estimated ejection fraction is 55%.  · Grade I left ventricular diastolic dysfunction.  · Study quality was technically difficult  · Normal right ventricular size with normal right ventricular systolic function.  · Normal central venous pressure (3 mmHg).  · The estimated PA systolic pressure is 50 mmHg.  · Interatrial septal aneurysm  · Mild tricuspid regurgitation.      X-Ray Chest AP Portable  Narrative: EXAMINATION:  XR CHEST AP PORTABLE    CLINICAL HISTORY:  Shortness of breath    TECHNIQUE:  Single frontal view of the chest was performed.    COMPARISON:  Chest radiograph 03/05/2022    FINDINGS:  Monitoring leads and tubing overlie the chest.    Cardiomediastinal silhouette is midline and stable.  Pulmonary vasculature and hilar contours are unchanged.  Few scattered linear opacities throughout the lungs consistent with minimal platelike scarring versus atelectasis.  Bilateral diffuse nonspecific interstitial coarsening with a perihilar and upper lobe predominance slightly more so on the left, with central peribronchial cuffing and overall increased from 03/05/2022 study.  The lungs are otherwise symmetrically hyperexpanded without large consolidation, pleural effusion or pneumothorax.  No acute osseous process seen.  Impression: Left greater than right perihilar and upper lung zone predominant interval increased nonspecific interstitial coarsening which can be seen with worsening interstitial type pneumonia from inflammatory or infectious process including atypical bacterial or viral etiology versus more atypical distribution of pulmonary edema.  No large consolidation.    Electronically signed by: Mckinley Talbert MD  Date:    03/13/2022  Time:    19:56      All imaging within the last 24 hours was  reviewed.       Discharge Planning   RUTH: 3/24/2022     Code Status: Full Code   Is the patient medically ready for discharge?: Yes    Reason for patient still in hospital (select all that apply): Patient trending condition and Pending disposition  Discharge Plan A: Home   Discharge Delays: (!) Home Medical Equipment (Insurance, Delivery)        Assessment/Plan:      * Acute on chronic respiratory failure with hypercapnia  Uses home O2 at 2 - 3 LPM  Needs BiPAP at home for nightly use - awaiting Medicaid approval  Doing better on 1 LPM O2    BiPAP (biphasic positive airway pressure) dependence  Awaiting insurance approval for BiPAP    Epigastric pain  With history or gastric ulcer  Continued PPI. Added TUMS and Carafate 3/20  Persisting - trial of GI cocktail x1 effective; need to reschedule appointment with GI for repeat EGD as soon as possible - EGD scheduled on 4/8 - patient needs pulmonary clearance prior to EGD and if cannot be scheduled in outpatient clinic, the consult team will assess    Chronic diastolic heart failure  Control BP; continuing home medications  Monitor    Chronic obstructive pulmonary disease  Patient having difficulty using his home inhalers - PharmD assistance appreciated.    Tobacco abuse  - Cessation counseling  - Nicotine patch     Essential hypertension  - Continue home Coreg and Lasix    COPD exacerbation  Wean O2 as tolerated to keep O2 saturations >88%  -  Repeat ECG without prolonged QTc- stopped Rocephin and azithro and transition to levaquin to complete course  - Continued prednisone to complete course  - Scheduled duonebs q 6 hrs      VTE Risk Mitigation (From admission, onward)         Ordered     enoxaparin injection 40 mg  Daily         03/13/22 2246     IP VTE HIGH RISK PATIENT  Once         03/13/22 2246     Place sequential compression device  Until discontinued         03/13/22 2246                  I have assessed these findings virtually using a telemed platform and with  assistance of the bedside nurse.        The attending portion of this evaluation, treatment, and documentation was performed per Michelle Espino MD via Telemedicine AudioVisual using the secure [employer] software platform with 2 way audio/video. The provider was located off-site and the patient is located in the hospital. The aforementioned video software was utilized to document the relevant history and physical exam    Michelle Espino MD  Department of Hospital Medicine   [facility]

## 2022-03-25 NOTE — PLAN OF CARE
[facility] - [facility]  Discharge Reassessment    Primary Care Provider: Rhonda Amarilys Montalvo    Expected Discharge Date: 3/25/2022    Reassessment (most recent)     Discharge Reassessment - 03/25/22 1339        Discharge Reassessment    Assessment Type Discharge Planning Reassessment     Did the patient's condition or plan change since previous assessment? No     Discharge Plan discussed with: Patient     Communicated RUTH with patient/caregiver Yes     Discharge Plan A Home     Discharge Plan B Home     DME Needed Upon Discharge  BIPAP     Discharge Barriers Identified Does not adhere to care plan     Why the patient remains in the hospital Insurance issues        Post-Acute Status    Post-Acute Authorization Other     Other Status No Post-Acute Service Needs     Discharge Delays Payor Issues                 Galina Beasley RN  Ext [phone]

## 2022-03-25 NOTE — ASSESSMENT & PLAN NOTE
Patient has EGD scheduled for 8/4 for follow up of peptic ulcer disease. He has undergone this procedure previously without complication. It is non-emergent.     Patient with ARISCAT score 44, intermediate risk. This equates to a 13.3% risk of in-hospital post-op pulmonary complications (comprising of respiratory failure, respiratory infection, pleural effusion, atelectasis, pneumothorax, bronchospasm treated with bronchodilators or aspiration pneumonitis).     It is important to recognize that this is both an outpatient procedure and non-surgical in origin, thus the above scoring system is not validated for use in this scenario. Taking into account that an ARISTCAT score does not factor in patient's pulmonary function, of which this patient's pulmonary function is severely reduced, one could conclude that it likely underestimates his risk for pulmonary complications.     Delaying his procedure does not, per the ARISCAT score, change his risk classification. Patient has previously tolerated an EGD without issue. Would recommend an anesthesiologist be present during his procedure given his higher than average risk for pulmonary complications.

## 2022-03-25 NOTE — PROGRESS NOTES
[facility] - [facility] Medicine  Telemedicine Progress Note    Patient Name: Baltazar Mccray  MRN: [id number]  Patient Class: IP- Inpatient   Admission Date: 3/13/2022  Length of Stay: 12 days  Attending Physician: Michelle Espino MD  Primary Care Provider: Rhonda Of Katia          Subjective:     Principal Problem:Acute on chronic respiratory failure with hypercapnia        HPI:  Mei Mccray 61yoM w/PMHx of CHF, COPD, HTN, AFib who presented to [facility] w/ shortness of breath 03/13.  Per EMS, patient had been feeling short of breath all day.  He was found to be hypoxic with increased work of breathing. En route to ED, he received 1 DuoNeb, Solu-Medrol 125mg, magnesium 2 g and 2.5 of Versed for acute agitation.  He was placed on CPAP.     Upon arrival to ED,  patient was tachypneic to 30s and hypertensive 150s/60s. He was transitioned from CPAP to BIPAP. Labs notable for WBC 12.8, Hg 12.5, Bicarb 34, , VBG 7.209/PCO2 104/PO2 27/PHCO3 41/BE 14+- while on Bipap. CXR w/interstitial coarsening- interstitial pneumonia vs.pulm edema. Admitted to MICU due to need for continuous bipap.           Overview/Hospital Course:  ICU Course:  Patient was started on azithro/rocephin for suspected CAP/ treatment for COPD exacerbation. Continued on prednisone 40mg qday. Breathing treatments set at every 6 hours. Patient was weaned to bipap at nighttime and w/naps. On 3L BNC. Most recent VBG w/pH 7.37/pCO2 30/pO2 30/pHCO3 43/BE 18. Deemed stable for transfer to hospital medicine 03/14.    Hospital Medicine Course:  Patient officially transferred to hospital medicine service 03/16. He was doing well- on home oxygen settings. Repeat ECG with normal Qtc. Patient was transitioned to levofloxacin to complete 5d course. Sent script to complete 5d of prednisone total. Outpatient follow-up scheduled w/PCP and pulmonology. Patient was stable for discharge to home 03/16, but had delays in arranging home bipap. Awaiting  approval from insurance company.           This encounter was provided through telemedicine.  Patient was transferred to the telemedicine service on:  03/17/2022   The patient location is: 654/654 A admitted 3/13/2022  7:17 PM.  Present with the patient at the time of the telemed/virtual assessment: Telepresenter    Interval History/Overnight Events:       Patient frustrated over the length of time it is taking to get his BiPAP equipment but calmer today;  pulmonary to attempt to see patient in clinic before repeat EGD    Review of Systems   Constitutional:  Negative for activity change, fatigue and fever.   Respiratory:  Positive for cough. Negative for shortness of breath.    Cardiovascular:  Negative for leg swelling.   Gastrointestinal:  Negative for abdominal pain and vomiting.      Inpatient Medications:  Scheduled Meds:   albuterol-ipratropium  3 mL Nebulization Q6H    carvediloL  12.5 mg Oral BID WM    enoxaparin  40 mg Subcutaneous Daily    furosemide  20 mg Oral Daily    nicotine  1 patch Transdermal Daily    pantoprazole  40 mg Oral BID    sucralfate  1 g Oral QID (AC & HS)     Continuous Infusions:  PRN Meds:.acetaminophen, calcium carbonate, cloNIDine, hydrALAZINE, influenza, magnesium hydroxide 400 mg/5 ml, simethicone, sodium chloride 0.9%      Objective:     Temp:  [98 °F (36.7 °C)-98.4 °F (36.9 °C)] 98.3 °F (36.8 °C)  Pulse:  [78-90] 86  Resp:  [16-96] 20  SpO2:  [22 %-98 %] 92 %  BP: (101-130)/(62-85) 123/63      Intake/Output Summary (Last 24 hours) at 3/25/2022 1428  Last data filed at 3/25/2022 1200  Gross per 24 hour   Intake 780 ml   Output 350 ml   Net 430 ml          Body mass index is 23.62 kg/m².    Physical Exam  Vitals and nursing note reviewed.   Constitutional:       General: He is not in acute distress.     Appearance: Normal appearance. He is normal weight. He is not ill-appearing.   HENT:      Head: Normocephalic and atraumatic.      Right Ear: Hearing normal.      Left Ear:  Hearing normal.      Nose: Nose normal.   Eyes:      General: No scleral icterus.        Right eye: No discharge.         Left eye: No discharge.      Extraocular Movements: Extraocular movements intact.   Cardiovascular:      Rate and Rhythm: Normal rate.   Pulmonary:      Effort: Pulmonary effort is normal. No accessory muscle usage or respiratory distress.      Breath sounds: No wheezing (per RN exam).   Skin:     Findings: No rash.   Neurological:      General: No focal deficit present.      Mental Status: He is alert and oriented to person, place, and time.      Cranial Nerves: No cranial nerve deficit.      Motor: No weakness.   Psychiatric:         Attention and Perception: Attention normal.         Mood and Affect: Mood is depressed. Affect is blunt.         Speech: Speech is delayed.         Behavior: Behavior is cooperative.        Labs:  No results found for this or any previous visit (from the past 24 hour(s)).       Lab Results   Component Value Date    [id number] Negative 03/13/2022       Recent Labs   Lab 03/20/22 0445 03/21/22  0235 03/22/22  0300   WBC 8.67 8.78 7.85   LYMPH 34.9  3.0 31.1  2.7 30.3  2.4   HGB 11.9* 12.7* 13.3*   HCT 39.6* 41.2 42.9    260 239       Recent Labs   Lab 03/20/22 0445 03/21/22  0235 03/22/22  0300 03/24/22  0236    140 135* 137   K 4.3 4.3 4.1 4.1    98 97 98   CO2 31* 33* 30* 31*   BUN 18 20 18 18   CREATININE 0.9 1.1 0.9 0.9   GLU 96 83 88 91   CALCIUM 8.8 9.1 9.2 8.9   MG 1.9 1.9 2.0  --    PHOS 3.0 3.2 2.9 2.7       Recent Labs   Lab 03/20/22  0445 03/21/22  0235 03/22/22  0300 03/24/22  0236   ALKPHOS 76 69 65  --    ALT 24 23 18  --    AST 12 11 12  --    ALBUMIN 2.7* 2.8* 2.8* 2.6*   PROT 5.7* 6.2 6.3  --    BILITOT 0.4 0.6 0.7  --           No results for input(s): DDIMER, FERRITIN, CRP, LDH, BNP, TROPONINI, CPK in the last 72 hours.    Invalid input(s): PROCALCITONIN    All labs within the last 24 hours were reviewed.      Microbiology:  Microbiology Results (last 7 days)       Procedure Component Value Units Date/Time    Blood culture #1 **CANNOT BE ORDERED STAT** [[id number]] Collected: 03/13/22 2054    Order Status: Completed Specimen: Blood from Peripheral, Forearm, Right Updated: 03/18/22 2312     Blood Culture, Routine No growth after 5 days.    Blood culture #2 **CANNOT BE ORDERED STAT** [[id number]] Collected: 03/13/22 2054    Order Status: Completed Specimen: Blood from Peripheral, Antecubital, Right Updated: 03/18/22 2312     Blood Culture, Routine No growth after 5 days.              Imaging  ECG Results              EKG 12-lead (Final result)  Result time 03/14/22 16:40:40      Final result by Interface, Lab In [facility] (03/14/22 16:40:40)                   Narrative:    Test Reason : R06.02,    Vent. Rate : 102 BPM     Atrial Rate : 102 BPM     P-R Int : 160 ms          QRS Dur : 086 ms      QT Int : 354 ms       P-R-T Axes : 083 079 085 degrees     QTc Int : 461 ms    Sinus tachycardia  Right atrial enlargement  Borderline Abnormal ECG  When compared with ECG of 05-MAR-2022 15:30,  No significant change was found  Confirmed by Bryan DOBBINS MD ([id number]) on 3/14/2022 3:41:33 PM    Referred By: [email or web address]   SELF           Confirmed By:Bryan DOBBINS MD                                    Results for orders placed during the hospital encounter of 01/28/22    Echo    Interpretation Summary  · The left ventricle is normal in size with normal systolic function.  · Mild left atrial enlargement.  · There is pulmonary hypertension.  · The estimated ejection fraction is 55%.  · Grade I left ventricular diastolic dysfunction.  · Study quality was technically difficult  · Normal right ventricular size with normal right ventricular systolic function.  · Normal central venous pressure (3 mmHg).  · The estimated PA systolic pressure is 50 mmHg.  · Interatrial septal aneurysm  · Mild tricuspid regurgitation.      X-Ray Chest AP  Portable  Narrative: EXAMINATION:  XR CHEST AP PORTABLE    CLINICAL HISTORY:  Shortness of breath    TECHNIQUE:  Single frontal view of the chest was performed.    COMPARISON:  Chest radiograph 03/05/2022    FINDINGS:  Monitoring leads and tubing overlie the chest.    Cardiomediastinal silhouette is midline and stable.  Pulmonary vasculature and hilar contours are unchanged.  Few scattered linear opacities throughout the lungs consistent with minimal platelike scarring versus atelectasis.  Bilateral diffuse nonspecific interstitial coarsening with a perihilar and upper lobe predominance slightly more so on the left, with central peribronchial cuffing and overall increased from 03/05/2022 study.  The lungs are otherwise symmetrically hyperexpanded without large consolidation, pleural effusion or pneumothorax.  No acute osseous process seen.  Impression: Left greater than right perihilar and upper lung zone predominant interval increased nonspecific interstitial coarsening which can be seen with worsening interstitial type pneumonia from inflammatory or infectious process including atypical bacterial or viral etiology versus more atypical distribution of pulmonary edema.  No large consolidation.    Electronically signed by: Mckinley Talbert MD  Date:    03/13/2022  Time:    19:56      All imaging within the last 24 hours was reviewed.       Discharge Planning   RUTH: 3/25/2022     Code Status: Full Code   Is the patient medically ready for discharge?: Yes    Reason for patient still in hospital (select all that apply): Patient trending condition and Pending disposition  Discharge Plan A: Home   Discharge Delays: (!) Payor Issues        Assessment/Plan:      * Acute on chronic respiratory failure with hypercapnia  Uses home O2 at 2 - 3 LPM  Needs BiPAP at home for nightly use - awaiting Medicaid approval  Doing better on 1 LPM O2    BiPAP (biphasic positive airway pressure) dependence  Awaiting insurance approval for  BiPAP    Epigastric pain  With history or gastric ulcer  Continued PPI. Added TUMS and Carafate 3/20  Persisting - trial of GI cocktail x1 effective; need to reschedule appointment with GI for repeat EGD as soon as possible - EGD scheduled on 4/8 - patient needs pulmonary clearance prior to EGD and if cannot be scheduled in outpatient clinic, the consult team will assess    Chronic diastolic heart failure  Control BP; continuing home medications  Monitor    Chronic obstructive pulmonary disease  Patient having difficulty using his home inhalers - PharmD assistance appreciated.    Tobacco abuse  - Cessation counseling  - Nicotine patch     Essential hypertension  - Continue home Coreg and Lasix    COPD exacerbation  Wean O2 as tolerated to keep O2 saturations >88%  -  Repeat ECG without prolonged QTc- stopped Rocephin and azithro and transition to levaquin to complete course  - Continued prednisone to complete course  - Scheduled duonebs q 6 hrs      VTE Risk Mitigation (From admission, onward)         Ordered     enoxaparin injection 40 mg  Daily         03/13/22 2246     IP VTE HIGH RISK PATIENT  Once         03/13/22 2246     Place sequential compression device  Until discontinued         03/13/22 2246              I have assessed these findings virtually using a telemed platform and with assistance of the bedside nurse.        The attending portion of this evaluation, treatment, and documentation was performed per Michelle Espino MD via Telemedicine AudioVisual using the secure P[employer] software platform with 2 way audio/video. The provider was located off-site and the patient is located in the hospital. The aforementioned video software was utilized to document the relevant history and physical exam    Michelle Espino MD  Department of Hospital Medicine   [facility]

## 2022-03-25 NOTE — SUBJECTIVE & OBJECTIVE
This encounter was provided through telemedicine.  Patient was transferred to the telemedicine service on:  03/17/2022   The patient location is: 654/654 A admitted 3/13/2022  7:17 PM.  Present with the patient at the time of the telemed/virtual assessment: Telepresenter    Interval History/Overnight Events:       Patient frustrated over the length of time it is taking to get his BiPAP equipment but calmer today;  pulmonary to attempt to see patient in clinic before repeat EGD    Review of Systems   Constitutional:  Negative for activity change, fatigue and fever.   Respiratory:  Positive for cough. Negative for shortness of breath.    Cardiovascular:  Negative for leg swelling.   Gastrointestinal:  Negative for abdominal pain and vomiting.      Inpatient Medications:  Scheduled Meds:   albuterol-ipratropium  3 mL Nebulization Q6H    carvediloL  12.5 mg Oral BID WM    enoxaparin  40 mg Subcutaneous Daily    furosemide  20 mg Oral Daily    nicotine  1 patch Transdermal Daily    pantoprazole  40 mg Oral BID    sucralfate  1 g Oral QID (AC & HS)     Continuous Infusions:  PRN Meds:.acetaminophen, calcium carbonate, cloNIDine, hydrALAZINE, influenza, magnesium hydroxide 400 mg/5 ml, simethicone, sodium chloride 0.9%      Objective:     Temp:  [98 °F (36.7 °C)-98.4 °F (36.9 °C)] 98.3 °F (36.8 °C)  Pulse:  [78-90] 86  Resp:  [16-96] 20  SpO2:  [22 %-98 %] 92 %  BP: (101-130)/(62-85) 123/63      Intake/Output Summary (Last 24 hours) at 3/25/2022 1428  Last data filed at 3/25/2022 1200  Gross per 24 hour   Intake 780 ml   Output 350 ml   Net 430 ml          Body mass index is 23.62 kg/m².    Physical Exam  Vitals and nursing note reviewed.   Constitutional:       General: He is not in acute distress.     Appearance: Normal appearance. He is normal weight. He is not ill-appearing.   HENT:      Head: Normocephalic and atraumatic.      Right Ear: Hearing normal.      Left Ear: Hearing normal.      Nose: Nose normal.   Eyes:       General: No scleral icterus.        Right eye: No discharge.         Left eye: No discharge.      Extraocular Movements: Extraocular movements intact.   Cardiovascular:      Rate and Rhythm: Normal rate.   Pulmonary:      Effort: Pulmonary effort is normal. No accessory muscle usage or respiratory distress.      Breath sounds: No wheezing (per RN exam).   Skin:     Findings: No rash.   Neurological:      General: No focal deficit present.      Mental Status: He is alert and oriented to person, place, and time.      Cranial Nerves: No cranial nerve deficit.      Motor: No weakness.   Psychiatric:         Attention and Perception: Attention normal.         Mood and Affect: Mood is depressed. Affect is blunt.         Speech: Speech is delayed.         Behavior: Behavior is cooperative.        Labs:  No results found for this or any previous visit (from the past 24 hour(s)).       Lab Results   Component Value Date    [id number] Negative 03/13/2022       Recent Labs   Lab 03/20/22 0445 03/21/22  0235 03/22/22  0300   WBC 8.67 8.78 7.85   LYMPH 34.9  3.0 31.1  2.7 30.3  2.4   HGB 11.9* 12.7* 13.3*   HCT 39.6* 41.2 42.9    260 239       Recent Labs   Lab 03/20/22 0445 03/21/22  0235 03/22/22  0300 03/24/22  0236    140 135* 137   K 4.3 4.3 4.1 4.1    98 97 98   CO2 31* 33* 30* 31*   BUN 18 20 18 18   CREATININE 0.9 1.1 0.9 0.9   GLU 96 83 88 91   CALCIUM 8.8 9.1 9.2 8.9   MG 1.9 1.9 2.0  --    PHOS 3.0 3.2 2.9 2.7       Recent Labs   Lab 03/20/22  0445 03/21/22  0235 03/22/22  0300 03/24/22  0236   ALKPHOS 76 69 65  --    ALT 24 23 18  --    AST 12 11 12  --    ALBUMIN 2.7* 2.8* 2.8* 2.6*   PROT 5.7* 6.2 6.3  --    BILITOT 0.4 0.6 0.7  --           No results for input(s): DDIMER, FERRITIN, CRP, LDH, BNP, TROPONINI, CPK in the last 72 hours.    Invalid input(s): PROCALCITONIN    All labs within the last 24 hours were reviewed.     Microbiology:  Microbiology Results (last 7 days)        Procedure Component Value Units Date/Time    Blood culture #1 **CANNOT BE ORDERED STAT** [[id number]] Collected: 03/13/22 2054    Order Status: Completed Specimen: Blood from Peripheral, Forearm, Right Updated: 03/18/22 2312     Blood Culture, Routine No growth after 5 days.    Blood culture #2 **CANNOT BE ORDERED STAT** [[id number]] Collected: 03/13/22 2054    Order Status: Completed Specimen: Blood from Peripheral, Antecubital, Right Updated: 03/18/22 2312     Blood Culture, Routine No growth after 5 days.              Imaging  ECG Results              EKG 12-lead (Final result)  Result time 03/14/22 16:40:40      Final result by Interface, Lab In [facility] (03/14/22 16:40:40)                   Narrative:    Test Reason : R06.02,    Vent. Rate : 102 BPM     Atrial Rate : 102 BPM     P-R Int : 160 ms          QRS Dur : 086 ms      QT Int : 354 ms       P-R-T Axes : 083 079 085 degrees     QTc Int : 461 ms    Sinus tachycardia  Right atrial enlargement  Borderline Abnormal ECG  When compared with ECG of 05-MAR-2022 15:30,  No significant change was found  Confirmed by Bryan DOBBINS MD ([id number]) on 3/14/2022 3:41:33 PM    Referred By: [email or web address]   SELF           Confirmed By:Bryan DOBBINS MD                                    Results for orders placed during the hospital encounter of 01/28/22    Echo    Interpretation Summary  · The left ventricle is normal in size with normal systolic function.  · Mild left atrial enlargement.  · There is pulmonary hypertension.  · The estimated ejection fraction is 55%.  · Grade I left ventricular diastolic dysfunction.  · Study quality was technically difficult  · Normal right ventricular size with normal right ventricular systolic function.  · Normal central venous pressure (3 mmHg).  · The estimated PA systolic pressure is 50 mmHg.  · Interatrial septal aneurysm  · Mild tricuspid regurgitation.      X-Ray Chest AP Portable  Narrative: EXAMINATION:  XR CHEST AP PORTABLE    CLINICAL  HISTORY:  Shortness of breath    TECHNIQUE:  Single frontal view of the chest was performed.    COMPARISON:  Chest radiograph 03/05/2022    FINDINGS:  Monitoring leads and tubing overlie the chest.    Cardiomediastinal silhouette is midline and stable.  Pulmonary vasculature and hilar contours are unchanged.  Few scattered linear opacities throughout the lungs consistent with minimal platelike scarring versus atelectasis.  Bilateral diffuse nonspecific interstitial coarsening with a perihilar and upper lobe predominance slightly more so on the left, with central peribronchial cuffing and overall increased from 03/05/2022 study.  The lungs are otherwise symmetrically hyperexpanded without large consolidation, pleural effusion or pneumothorax.  No acute osseous process seen.  Impression: Left greater than right perihilar and upper lung zone predominant interval increased nonspecific interstitial coarsening which can be seen with worsening interstitial type pneumonia from inflammatory or infectious process including atypical bacterial or viral etiology versus more atypical distribution of pulmonary edema.  No large consolidation.    Electronically signed by: Mckinley Talbert MD  Date:    03/13/2022  Time:    19:56      All imaging within the last 24 hours was reviewed.       Discharge Planning   RUTH: 3/25/2022     Code Status: Full Code   Is the patient medically ready for discharge?: Yes    Reason for patient still in hospital (select all that apply): Patient trending condition and Pending disposition  Discharge Plan A: Home   Discharge Delays: (!) Payor Issues

## 2022-03-25 NOTE — PLAN OF CARE
Problem: Adult Inpatient Plan of Care  Goal: Plan of Care Review  Outcome: Ongoing, Progressing     Problem: Fall Injury Risk  Goal: Absence of Fall and Fall-Related Injury  Outcome: Ongoing, Progressing     Problem: Adult Inpatient Plan of Care  Goal: Absence of Hospital-Acquired Illness or Injury  Outcome: Ongoing, Progressing     Problem: Adult Inpatient Plan of Care  Goal: Absence of Hospital-Acquired Illness or Injury  Outcome: Ongoing, Progressing   Pt is A,A,O x 4 . Stable V/S. Pt ambulated in the room independently . Pt remained free of falls and injuries . No significant changes during the day . Still waiting for authorization .

## 2022-03-25 NOTE — SUBJECTIVE & OBJECTIVE
This encounter was provided through telemedicine.  Patient was transferred to the telemedicine service on:  03/17/2022   The patient location is: 654/654 A admitted 3/13/2022  7:17 PM.  Present with the patient at the time of the telemed/virtual assessment: Telepresenter    Interval History/Overnight Events:   Clinical record since admit reviewed.    Patient frustrated over the length of time it is taking to get his BiPAP equipment; he denies shortness of breast says he feels well; emphasized the need to continue using his BiPAP nightly especially when he is feeling well; tolerating BIPAP for 8-9 hours nightly    Review of Systems   Constitutional:  Negative for activity change, fatigue and fever.   Respiratory:  Positive for cough. Negative for shortness of breath.    Cardiovascular:  Negative for leg swelling.   Gastrointestinal:  Negative for abdominal pain and vomiting.      Inpatient Medications:  Scheduled Meds:   albuterol-ipratropium  3 mL Nebulization Q6H    carvediloL  12.5 mg Oral BID WM    enoxaparin  40 mg Subcutaneous Daily    furosemide  20 mg Oral Daily    nicotine  1 patch Transdermal Daily    pantoprazole  40 mg Oral BID    sucralfate  1 g Oral QID (AC & HS)     Continuous Infusions:  PRN Meds:.acetaminophen, calcium carbonate, cloNIDine, hydrALAZINE, influenza, magnesium hydroxide 400 mg/5 ml, simethicone, sodium chloride 0.9%      Objective:     Temp:  [97.5 °F (36.4 °C)-99.4 °F (37.4 °C)] 98.3 °F (36.8 °C)  Pulse:  [78-92] 90  Resp:  [17-26] 26  SpO2:  [89 %-99 %] 95 %  BP: (101-130)/(63-76) 130/70      Intake/Output Summary (Last 24 hours) at 3/24/2022 1941  Last data filed at 3/24/2022 1742  Gross per 24 hour   Intake 1580 ml   Output 300 ml   Net 1280 ml        Body mass index is 23.62 kg/m².    Physical Exam  Vitals and nursing note reviewed.   Constitutional:       General: He is not in acute distress.     Appearance: Normal appearance. He is normal weight. He is not ill-appearing.    HENT:      Head: Normocephalic and atraumatic.      Right Ear: Hearing normal.      Left Ear: Hearing normal.      Nose: Nose normal.   Eyes:      General: No scleral icterus.        Right eye: No discharge.         Left eye: No discharge.      Extraocular Movements: Extraocular movements intact.   Cardiovascular:      Rate and Rhythm: Normal rate.   Pulmonary:      Effort: Pulmonary effort is normal. No accessory muscle usage or respiratory distress.      Breath sounds: No wheezing (per RN exam).   Skin:     Findings: No rash.   Neurological:      General: No focal deficit present.      Mental Status: He is alert and oriented to person, place, and time.      Cranial Nerves: No cranial nerve deficit.      Motor: No weakness.   Psychiatric:         Attention and Perception: Attention normal.         Mood and Affect: Mood is depressed. Affect is blunt.         Speech: Speech is delayed.         Behavior: Behavior is cooperative.        Labs:  Recent Results (from the past 24 hour(s))   Renal function panel    Collection Time: 03/24/22  2:36 AM   Result Value Ref Range    Glucose 91 70 - 110 mg/dL    Sodium 137 136 - 145 mmol/L    Potassium 4.1 3.5 - 5.1 mmol/L    Chloride 98 95 - 110 mmol/L    CO2 31 (H) 23 - 29 mmol/L    BUN 18 6 - 20 mg/dL    Calcium 8.9 8.7 - 10.5 mg/dL    Creatinine 0.9 0.5 - 1.4 mg/dL    Albumin 2.6 (L) 3.5 - 5.2 g/dL    Phosphorus 2.7 2.7 - 4.5 mg/dL    eGFR if African American >60.0 >60 mL/min/1.73 m^2    eGFR if non African American >60.0 >60 mL/min/1.73 m^2    Anion Gap 8 8 - 16 mmol/L        Lab Results   Component Value Date    [id number] Negative 03/13/2022       Recent Labs   Lab 03/20/22  0445 03/21/22  0235 03/22/22  0300   WBC 8.67 8.78 7.85   LYMPH 34.9  3.0 31.1  2.7 30.3  2.4   HGB 11.9* 12.7* 13.3*   HCT 39.6* 41.2 42.9    260 239     Recent Labs   Lab 03/20/22  0445 03/21/22  0235 03/22/22  0300 03/24/22  0236    140 135* 137   K 4.3 4.3 4.1 4.1    98 97  98   CO2 31* 33* 30* 31*   BUN 18 20 18 18   CREATININE 0.9 1.1 0.9 0.9   GLU 96 83 88 91   CALCIUM 8.8 9.1 9.2 8.9   MG 1.9 1.9 2.0  --    PHOS 3.0 3.2 2.9 2.7     Recent Labs   Lab 03/20/22  0445 03/21/22  0235 03/22/22  0300 03/24/22  0236   ALKPHOS 76 69 65  --    ALT 24 23 18  --    AST 12 11 12  --    ALBUMIN 2.7* 2.8* 2.8* 2.6*   PROT 5.7* 6.2 6.3  --    BILITOT 0.4 0.6 0.7  --         No results for input(s): DDIMER, FERRITIN, CRP, LDH, BNP, TROPONINI, CPK in the last 72 hours.    Invalid input(s): PROCALCITONIN    All labs within the last 24 hours were reviewed.     Microbiology:  Microbiology Results (last 7 days)       Procedure Component Value Units Date/Time    Blood culture #1 **CANNOT BE ORDERED STAT** [[id number]] Collected: 03/13/22 2054    Order Status: Completed Specimen: Blood from Peripheral, Forearm, Right Updated: 03/18/22 2312     Blood Culture, Routine No growth after 5 days.    Blood culture #2 **CANNOT BE ORDERED STAT** [[id number]] Collected: 03/13/22 2054    Order Status: Completed Specimen: Blood from Peripheral, Antecubital, Right Updated: 03/18/22 2312     Blood Culture, Routine No growth after 5 days.              Imaging  ECG Results              EKG 12-lead (Final result)  Result time 03/14/22 16:40:40      Final result by Interface, Lab In [facility] (03/14/22 16:40:40)                   Narrative:    Test Reason : R06.02,    Vent. Rate : 102 BPM     Atrial Rate : 102 BPM     P-R Int : 160 ms          QRS Dur : 086 ms      QT Int : 354 ms       P-R-T Axes : 083 079 085 degrees     QTc Int : 461 ms    Sinus tachycardia  Right atrial enlargement  Borderline Abnormal ECG  When compared with ECG of 05-MAR-2022 15:30,  No significant change was found  Confirmed by Bryan DOBBINS MD ([id number]) on 3/14/2022 3:41:33 PM    Referred By: SHARONA   SELF           Confirmed By:Bryan DOBBINS MD                                    Results for orders placed during the hospital encounter of  01/28/22    Echo    Interpretation Summary  · The left ventricle is normal in size with normal systolic function.  · Mild left atrial enlargement.  · There is pulmonary hypertension.  · The estimated ejection fraction is 55%.  · Grade I left ventricular diastolic dysfunction.  · Study quality was technically difficult  · Normal right ventricular size with normal right ventricular systolic function.  · Normal central venous pressure (3 mmHg).  · The estimated PA systolic pressure is 50 mmHg.  · Interatrial septal aneurysm  · Mild tricuspid regurgitation.      X-Ray Chest AP Portable  Narrative: EXAMINATION:  XR CHEST AP PORTABLE    CLINICAL HISTORY:  Shortness of breath    TECHNIQUE:  Single frontal view of the chest was performed.    COMPARISON:  Chest radiograph 03/05/2022    FINDINGS:  Monitoring leads and tubing overlie the chest.    Cardiomediastinal silhouette is midline and stable.  Pulmonary vasculature and hilar contours are unchanged.  Few scattered linear opacities throughout the lungs consistent with minimal platelike scarring versus atelectasis.  Bilateral diffuse nonspecific interstitial coarsening with a perihilar and upper lobe predominance slightly more so on the left, with central peribronchial cuffing and overall increased from 03/05/2022 study.  The lungs are otherwise symmetrically hyperexpanded without large consolidation, pleural effusion or pneumothorax.  No acute osseous process seen.  Impression: Left greater than right perihilar and upper lung zone predominant interval increased nonspecific interstitial coarsening which can be seen with worsening interstitial type pneumonia from inflammatory or infectious process including atypical bacterial or viral etiology versus more atypical distribution of pulmonary edema.  No large consolidation.    Electronically signed by: Mckinley Talbert MD  Date:    03/13/2022  Time:    19:56      All imaging within the last 24 hours was reviewed.       Discharge  Planning   RUTH: 3/24/2022     Code Status: Full Code   Is the patient medically ready for discharge?: Yes    Reason for patient still in hospital (select all that apply): Patient trending condition and Pending disposition  Discharge Plan A: Home   Discharge Delays: (!) Home Medical Equipment (Insurance, Delivery)

## 2022-03-25 NOTE — ASSESSMENT & PLAN NOTE
Uses home O2 at 2 - 3 LPM  Needs BiPAP at home for nightly use - awaiting Medicaid approval  Doing better on 1 LPM O2

## 2022-03-25 NOTE — SUBJECTIVE & OBJECTIVE
Past Medical History:   Diagnosis Date    Asthma     Atrial fibrillation with rapid ventricular response     CHF (congestive heart failure)     COPD (chronic obstructive pulmonary disease)     home O2 at night only    Coronary artery disease     Hypertension     Lung nodule     Pneumonia     Seizures        Past Surgical History:   Procedure Laterality Date    ESOPHAGOGASTRODUODENOSCOPY N/A 2/11/2022    Procedure: EGD (ESOPHAGOGASTRODUODENOSCOPY);  Surgeon: Cain Ortega MD;  Location: [facility] ENDO ([address] [place] [address]);  Service: Endoscopy;  Laterality: N/A;    LEFT HEART CATHETERIZATION  4/23/2020    Procedure: Left heart cath;  Surgeon: Nic Pollack MD;  Location: [facility] CATH LAB;  Service: Cardiology;;       Review of patient's allergies indicates:   Allergen Reactions    Benazepril Swelling    Duoneb [ipratropium-albuterol]      Report minor Tremors, pt seems to be tolerating well.       Family History       Problem Relation (Age of Onset)    Cancer Father    Hypertension Sister    Stroke Sister, Brother          Tobacco Use    Smoking status: Current Some Day Smoker     Packs/day: 0.25     Types: Cigarettes    Smokeless tobacco: Never Used    Tobacco comment: 1-2 per day   Substance and Sexual Activity    Alcohol use: Yes     Alcohol/week: 2.0 standard drinks     Types: 2 Cans of beer per week     Comment: every night    Drug use: No    Sexual activity: Not Currently         Review of Systems   Constitutional: Negative.  Negative for activity change, appetite change, chills, diaphoresis, fatigue, fever and unexpected weight change.   HENT: Negative.     Respiratory: Negative.  Negative for cough, choking and shortness of breath.    Cardiovascular: Negative.  Negative for chest pain, palpitations and leg swelling.   Gastrointestinal: Negative.  Negative for abdominal distention, diarrhea, nausea and vomiting.   Musculoskeletal: Negative.    Neurological:  Negative for syncope, weakness,  light-headedness, numbness and headaches.   Psychiatric/Behavioral: Negative.  Negative for agitation, behavioral problems and confusion.    Objective:     Vital Signs (Most Recent):  Temp: 98.3 °F (36.8 °C) (03/25/22 1216)  Pulse: 72 (03/25/22 1444)  Resp: 16 (03/25/22 1444)  BP: 123/63 (03/25/22 1216)  SpO2: 95 % (03/25/22 1446) Vital Signs (24h Range):  Temp:  [98 °F (36.7 °C)-98.4 °F (36.9 °C)] 98.3 °F (36.8 °C)  Pulse:  [72-90] 72  Resp:  [16-96] 16  SpO2:  [22 %-98 %] 95 %  BP: (101-130)/(62-85) 123/63     Weight: 68.4 kg (150 lb 12.7 oz)  Body mass index is 23.62 kg/m².      Intake/Output Summary (Last 24 hours) at 3/25/2022 1517  Last data filed at 3/25/2022 1200  Gross per 24 hour   Intake 780 ml   Output 350 ml   Net 430 ml       Physical Exam  Vitals and nursing note reviewed.   Constitutional:       General: He is not in acute distress.     Appearance: He is not ill-appearing, toxic-appearing or diaphoretic.   Cardiovascular:      Rate and Rhythm: Normal rate.   Pulmonary:      Effort: Pulmonary effort is normal. No respiratory distress.      Breath sounds: No wheezing or rales.   Abdominal:      General: Abdomen is flat. There is no distension.   Musculoskeletal:      Right lower leg: No edema.      Left lower leg: No edema.   Neurological:      Mental Status: He is alert.       Vents:  Oxygen Concentration (%): 24 (03/25/22 1446)    Lines/Drains/Airways       None                   Significant Labs:    CBC/Anemia Profile:  No results for input(s): WBC, HGB, HCT, PLT, MCV, RDW, IRON, FERRITIN, RETIC, FOLATE, [id number], OCCULTBLOOD in the last 48 hours.     Chemistries:  Recent Labs   Lab 03/24/22  0236      K 4.1   CL 98   CO2 31*   BUN 18   CREATININE 0.9   CALCIUM 8.9   ALBUMIN 2.6*   PHOS 2.7       All pertinent labs within the past 24 hours have been reviewed.    Significant Imaging:   I have reviewed all pertinent imaging results/findings within the past 24 hours.

## 2022-03-26 LAB
ALBUMIN SERPL BCP-MCNC: 2.6 G/DL (ref 3.5–5.2)
ANION GAP SERPL CALC-SCNC: 5 MMOL/L (ref 8–16)
BUN SERPL-MCNC: 12 MG/DL (ref 6–20)
CALCIUM SERPL-MCNC: 9.3 MG/DL (ref 8.7–10.5)
CHLORIDE SERPL-SCNC: 97 MMOL/L (ref 95–110)
CO2 SERPL-SCNC: 33 MMOL/L (ref 23–29)
CREAT SERPL-MCNC: 0.8 MG/DL (ref 0.5–1.4)
EST. GFR  (AFRICAN AMERICAN): >60 ML/MIN/1.73 M^2
EST. GFR  (NON AFRICAN AMERICAN): >60 ML/MIN/1.73 M^2
GLUCOSE SERPL-MCNC: 80 MG/DL (ref 70–110)
PHOSPHATE SERPL-MCNC: 3.4 MG/DL (ref 2.7–4.5)
POTASSIUM SERPL-SCNC: 4.4 MMOL/L (ref 3.5–5.1)
SODIUM SERPL-SCNC: 135 MMOL/L (ref 136–145)

## 2022-03-26 PROCEDURE — 94640 AIRWAY INHALATION TREATMENT: CPT

## 2022-03-26 PROCEDURE — 25000003 PHARM REV CODE 250: Performed by: INTERNAL MEDICINE

## 2022-03-26 PROCEDURE — 25000003 PHARM REV CODE 250: Performed by: NURSE PRACTITIONER

## 2022-03-26 PROCEDURE — 94660 CPAP INITIATION&MGMT: CPT

## 2022-03-26 PROCEDURE — 27000221 HC OXYGEN, UP TO 24 HOURS

## 2022-03-26 PROCEDURE — 99232 SBSQ HOSP IP/OBS MODERATE 35: CPT | Mod: 95,,, | Performed by: INTERNAL MEDICINE

## 2022-03-26 PROCEDURE — 25000003 PHARM REV CODE 250: Performed by: PHYSICIAN ASSISTANT

## 2022-03-26 PROCEDURE — 27000190 HC CPAP FULL FACE MASK W/VALVE

## 2022-03-26 PROCEDURE — S4991 NICOTINE PATCH NONLEGEND: HCPCS | Performed by: INTERNAL MEDICINE

## 2022-03-26 PROCEDURE — 99232 PR SUBSEQUENT HOSPITAL CARE,LEVL II: ICD-10-PCS | Mod: 95,,, | Performed by: INTERNAL MEDICINE

## 2022-03-26 PROCEDURE — 11000001 HC ACUTE MED/SURG PRIVATE ROOM

## 2022-03-26 PROCEDURE — 94761 N-INVAS EAR/PLS OXIMETRY MLT: CPT

## 2022-03-26 PROCEDURE — 25000242 PHARM REV CODE 250 ALT 637 W/ HCPCS: Performed by: PHYSICIAN ASSISTANT

## 2022-03-26 PROCEDURE — 80069 RENAL FUNCTION PANEL: CPT | Performed by: INTERNAL MEDICINE

## 2022-03-26 PROCEDURE — 36415 COLL VENOUS BLD VENIPUNCTURE: CPT | Performed by: INTERNAL MEDICINE

## 2022-03-26 PROCEDURE — 99900035 HC TECH TIME PER 15 MIN (STAT)

## 2022-03-26 RX ADMIN — ACETAMINOPHEN 650 MG: 325 TABLET ORAL at 08:03

## 2022-03-26 RX ADMIN — PANTOPRAZOLE SODIUM 40 MG: 40 TABLET, DELAYED RELEASE ORAL at 08:03

## 2022-03-26 RX ADMIN — NICOTINE 1 PATCH: 14 PATCH, EXTENDED RELEASE TRANSDERMAL at 08:03

## 2022-03-26 RX ADMIN — CARVEDILOL 12.5 MG: 12.5 TABLET, FILM COATED ORAL at 04:03

## 2022-03-26 RX ADMIN — SUCRALFATE 1 G: 1 TABLET ORAL at 12:03

## 2022-03-26 RX ADMIN — SUCRALFATE 1 G: 1 TABLET ORAL at 08:03

## 2022-03-26 RX ADMIN — SUCRALFATE 1 G: 1 TABLET ORAL at 04:03

## 2022-03-26 RX ADMIN — IPRATROPIUM BROMIDE AND ALBUTEROL SULFATE 3 ML: 2.5; .5 SOLUTION RESPIRATORY (INHALATION) at 12:03

## 2022-03-26 RX ADMIN — SUCRALFATE 1 G: 1 TABLET ORAL at 05:03

## 2022-03-26 RX ADMIN — IPRATROPIUM BROMIDE AND ALBUTEROL SULFATE 3 ML: 2.5; .5 SOLUTION RESPIRATORY (INHALATION) at 01:03

## 2022-03-26 RX ADMIN — CARVEDILOL 12.5 MG: 12.5 TABLET, FILM COATED ORAL at 08:03

## 2022-03-26 RX ADMIN — IPRATROPIUM BROMIDE AND ALBUTEROL SULFATE 3 ML: 2.5; .5 SOLUTION RESPIRATORY (INHALATION) at 07:03

## 2022-03-26 RX ADMIN — FUROSEMIDE 20 MG: 20 TABLET ORAL at 08:03

## 2022-03-26 NOTE — PLAN OF CARE
Pt is AAOx4.No significant changes over night.  Pt remain free from fall and injuries. VS remain stable. Questions and concerns voiced and answered. Medication compliance. Used Bipap through out the shift. Call light in reach. Bed in low locked position. Side rails x2. Belongings at bedside.

## 2022-03-26 NOTE — SUBJECTIVE & OBJECTIVE
This encounter was provided through telemedicine.  Patient was transferred to the telemedicine service on:  03/17/2022   The patient location is: 654/654 A admitted 3/13/2022  7:17 PM.  Present with the patient at the time of the telemed/virtual assessment: Telepresenter    Interval History/Overnight Events:   Calm today and feels well.  Awaiting BIPAP for home; seen by pulmonary for clearance for EGD on 4/8    Review of Systems   Constitutional:  Negative for activity change, fatigue and fever.   Respiratory:  Positive for cough. Negative for shortness of breath.    Cardiovascular:  Negative for leg swelling.   Gastrointestinal:  Negative for abdominal pain and vomiting.      Inpatient Medications:  Scheduled Meds:   albuterol-ipratropium  3 mL Nebulization Q6H    carvediloL  12.5 mg Oral BID WM    enoxaparin  40 mg Subcutaneous Daily    furosemide  20 mg Oral Daily    nicotine  1 patch Transdermal Daily    pantoprazole  40 mg Oral BID    sucralfate  1 g Oral QID (AC & HS)     Continuous Infusions:  PRN Meds:.acetaminophen, calcium carbonate, cloNIDine, hydrALAZINE, influenza, magnesium hydroxide 400 mg/5 ml, simethicone, sodium chloride 0.9%      Objective:     Temp:  [97.3 °F (36.3 °C)-98.8 °F (37.1 °C)] 98.1 °F (36.7 °C)  Pulse:  [72-87] 77  Resp:  [14-20] 14  SpO2:  [92 %-98 %] 98 %  BP: (102-140)/(59-70) 130/70      Intake/Output Summary (Last 24 hours) at 3/26/2022 1103  Last data filed at 3/26/2022 0552  Gross per 24 hour   Intake 1120 ml   Output 170 ml   Net 950 ml          Body mass index is 23.62 kg/m².    Physical Exam  Vitals and nursing note reviewed.   Constitutional:       General: He is not in acute distress.     Appearance: Normal appearance. He is normal weight. He is not ill-appearing.   HENT:      Head: Normocephalic and atraumatic.      Right Ear: Hearing normal.      Left Ear: Hearing normal.      Nose: Nose normal.   Eyes:      General: No scleral icterus.        Right eye: No discharge.          Left eye: No discharge.      Extraocular Movements: Extraocular movements intact.   Cardiovascular:      Rate and Rhythm: Normal rate.   Pulmonary:      Effort: Pulmonary effort is normal. No accessory muscle usage or respiratory distress.      Breath sounds: No wheezing (per RN exam).   Skin:     Findings: No rash.   Neurological:      General: No focal deficit present.      Mental Status: He is alert and oriented to person, place, and time.      Cranial Nerves: No cranial nerve deficit.      Motor: No weakness.   Psychiatric:         Attention and Perception: Attention normal.         Mood and Affect: Mood is depressed. Affect is blunt.         Speech: Speech is delayed.         Behavior: Behavior is cooperative.        Labs:  Recent Results (from the past 24 hour(s))   Renal function panel    Collection Time: 03/26/22  5:20 AM   Result Value Ref Range    Glucose 80 70 - 110 mg/dL    Sodium 135 (L) 136 - 145 mmol/L    Potassium 4.4 3.5 - 5.1 mmol/L    Chloride 97 95 - 110 mmol/L    CO2 33 (H) 23 - 29 mmol/L    BUN 12 6 - 20 mg/dL    Calcium 9.3 8.7 - 10.5 mg/dL    Creatinine 0.8 0.5 - 1.4 mg/dL    Albumin 2.6 (L) 3.5 - 5.2 g/dL    Phosphorus 3.4 2.7 - 4.5 mg/dL    eGFR if African American >60.0 >60 mL/min/1.73 m^2    eGFR if non African American >60.0 >60 mL/min/1.73 m^2    Anion Gap 5 (L) 8 - 16 mmol/L          Lab Results   Component Value Date    [id number] Negative 03/13/2022       Recent Labs   Lab 03/20/22  0445 03/21/22  0235 03/22/22  0300   WBC 8.67 8.78 7.85   LYMPH 34.9  3.0 31.1  2.7 30.3  2.4   HGB 11.9* 12.7* 13.3*   HCT 39.6* 41.2 42.9    260 239       Recent Labs   Lab 03/20/22  0445 03/21/22  0235 03/22/22  0300 03/24/22  0236 03/26/22  0520    140 135* 137 135*   K 4.3 4.3 4.1 4.1 4.4    98 97 98 97   CO2 31* 33* 30* 31* 33*   BUN 18 20 18 18 12   CREATININE 0.9 1.1 0.9 0.9 0.8   GLU 96 83 88 91 80   CALCIUM 8.8 9.1 9.2 8.9 9.3   MG 1.9 1.9 2.0  --   --    PHOS 3.0  3.2 2.9 2.7 3.4       Recent Labs   Lab 03/20/22  0445 03/21/22  0235 03/22/22  0300 03/24/22  0236 03/26/22  0520   ALKPHOS 76 69 65  --   --    ALT 24 23 18  --   --    AST 12 11 12  --   --    ALBUMIN 2.7* 2.8* 2.8* 2.6* 2.6*   PROT 5.7* 6.2 6.3  --   --    BILITOT 0.4 0.6 0.7  --   --           No results for input(s): DDIMER, FERRITIN, CRP, LDH, BNP, TROPONINI, CPK in the last 72 hours.    Invalid input(s): PROCALCITONIN    All labs within the last 24 hours were reviewed.     Microbiology:  Microbiology Results (last 7 days)       ** No results found for the last 168 hours. **              Imaging  ECG Results              EKG 12-lead (Final result)  Result time 03/14/22 16:40:40      Final result by Interface, Lab In [facility] (03/14/22 16:40:40)                   Narrative:    Test Reason : R06.02,    Vent. Rate : 102 BPM     Atrial Rate : 102 BPM     P-R Int : 160 ms          QRS Dur : 086 ms      QT Int : 354 ms       P-R-T Axes : 083 079 085 degrees     QTc Int : 461 ms    Sinus tachycardia  Right atrial enlargement  Borderline Abnormal ECG  When compared with ECG of 05-MAR-2022 15:30,  No significant change was found  Confirmed by Bryan DOBBINS MD ([id number]) on 3/14/2022 3:41:33 PM    Referred By: [email or web address]   SELF           Confirmed By:Bryan DOBBINS MD                                    Results for orders placed during the hospital encounter of 01/28/22    Echo    Interpretation Summary  · The left ventricle is normal in size with normal systolic function.  · Mild left atrial enlargement.  · There is pulmonary hypertension.  · The estimated ejection fraction is 55%.  · Grade I left ventricular diastolic dysfunction.  · Study quality was technically difficult  · Normal right ventricular size with normal right ventricular systolic function.  · Normal central venous pressure (3 mmHg).  · The estimated PA systolic pressure is 50 mmHg.  · Interatrial septal aneurysm  · Mild tricuspid regurgitation.      X-Ray Chest AP  Portable  Narrative: EXAMINATION:  XR CHEST AP PORTABLE    CLINICAL HISTORY:  Shortness of breath    TECHNIQUE:  Single frontal view of the chest was performed.    COMPARISON:  Chest radiograph 03/05/2022    FINDINGS:  Monitoring leads and tubing overlie the chest.    Cardiomediastinal silhouette is midline and stable.  Pulmonary vasculature and hilar contours are unchanged.  Few scattered linear opacities throughout the lungs consistent with minimal platelike scarring versus atelectasis.  Bilateral diffuse nonspecific interstitial coarsening with a perihilar and upper lobe predominance slightly more so on the left, with central peribronchial cuffing and overall increased from 03/05/2022 study.  The lungs are otherwise symmetrically hyperexpanded without large consolidation, pleural effusion or pneumothorax.  No acute osseous process seen.  Impression: Left greater than right perihilar and upper lung zone predominant interval increased nonspecific interstitial coarsening which can be seen with worsening interstitial type pneumonia from inflammatory or infectious process including atypical bacterial or viral etiology versus more atypical distribution of pulmonary edema.  No large consolidation.    Electronically signed by: Mckinley Talbert MD  Date:    03/13/2022  Time:    19:56      All imaging within the last 24 hours was reviewed.       Discharge Planning   RUTH: 3/28/2022     Code Status: Full Code   Is the patient medically ready for discharge?: Yes    Reason for patient still in hospital (select all that apply): Patient trending condition and Pending disposition  Discharge Plan A: Home   Discharge Delays: (!) Payor Issues

## 2022-03-27 PROCEDURE — 25000003 PHARM REV CODE 250: Performed by: INTERNAL MEDICINE

## 2022-03-27 PROCEDURE — 99232 SBSQ HOSP IP/OBS MODERATE 35: CPT | Mod: 95,,, | Performed by: INTERNAL MEDICINE

## 2022-03-27 PROCEDURE — 11000001 HC ACUTE MED/SURG PRIVATE ROOM

## 2022-03-27 PROCEDURE — S4991 NICOTINE PATCH NONLEGEND: HCPCS | Performed by: INTERNAL MEDICINE

## 2022-03-27 PROCEDURE — 25000003 PHARM REV CODE 250: Performed by: NURSE PRACTITIONER

## 2022-03-27 PROCEDURE — 27000221 HC OXYGEN, UP TO 24 HOURS

## 2022-03-27 PROCEDURE — 94761 N-INVAS EAR/PLS OXIMETRY MLT: CPT

## 2022-03-27 PROCEDURE — 25000242 PHARM REV CODE 250 ALT 637 W/ HCPCS: Performed by: PHYSICIAN ASSISTANT

## 2022-03-27 PROCEDURE — 94640 AIRWAY INHALATION TREATMENT: CPT

## 2022-03-27 PROCEDURE — 99900035 HC TECH TIME PER 15 MIN (STAT)

## 2022-03-27 PROCEDURE — 25000003 PHARM REV CODE 250: Performed by: PHYSICIAN ASSISTANT

## 2022-03-27 PROCEDURE — 99232 PR SUBSEQUENT HOSPITAL CARE,LEVL II: ICD-10-PCS | Mod: 95,,, | Performed by: INTERNAL MEDICINE

## 2022-03-27 RX ADMIN — IPRATROPIUM BROMIDE AND ALBUTEROL SULFATE 3 ML: 2.5; .5 SOLUTION RESPIRATORY (INHALATION) at 12:03

## 2022-03-27 RX ADMIN — SUCRALFATE 1 G: 1 TABLET ORAL at 04:03

## 2022-03-27 RX ADMIN — SUCRALFATE 1 G: 1 TABLET ORAL at 08:03

## 2022-03-27 RX ADMIN — SUCRALFATE 1 G: 1 TABLET ORAL at 11:03

## 2022-03-27 RX ADMIN — IPRATROPIUM BROMIDE AND ALBUTEROL SULFATE 3 ML: 2.5; .5 SOLUTION RESPIRATORY (INHALATION) at 01:03

## 2022-03-27 RX ADMIN — CARVEDILOL 12.5 MG: 12.5 TABLET, FILM COATED ORAL at 08:03

## 2022-03-27 RX ADMIN — CARVEDILOL 12.5 MG: 12.5 TABLET, FILM COATED ORAL at 04:03

## 2022-03-27 RX ADMIN — ACETAMINOPHEN 650 MG: 325 TABLET ORAL at 08:03

## 2022-03-27 RX ADMIN — NICOTINE 1 PATCH: 14 PATCH, EXTENDED RELEASE TRANSDERMAL at 08:03

## 2022-03-27 RX ADMIN — IPRATROPIUM BROMIDE AND ALBUTEROL SULFATE 3 ML: 2.5; .5 SOLUTION RESPIRATORY (INHALATION) at 07:03

## 2022-03-27 RX ADMIN — PANTOPRAZOLE SODIUM 40 MG: 40 TABLET, DELAYED RELEASE ORAL at 08:03

## 2022-03-27 RX ADMIN — FUROSEMIDE 20 MG: 20 TABLET ORAL at 08:03

## 2022-03-27 RX ADMIN — SUCRALFATE 1 G: 1 TABLET ORAL at 05:03

## 2022-03-27 NOTE — PLAN OF CARE
Went over plan of care - all questions answered. Pt is ready to go home. Pt complained of a head ache - meds given - no complaints . No falls or injuries. Will continue to monitor.

## 2022-03-27 NOTE — PROGRESS NOTES
[facility] Medicine  Telemedicine Progress Note    Patient Name: Baltazar Mccray  MRN: [id number]  Patient Class: IP- Inpatient   Admission Date: 3/13/2022  Length of Stay: 13 days  Attending Physician: Michelle Espino MD  Primary Care Provider: Rhonda Of Katia          Subjective:     Principal Problem:Acute on chronic respiratory failure with hypercapnia        HPI:  Mei Mccray 61yoM w/PMHx of CHF, COPD, HTN, AFib who presented to [facility] w/ shortness of breath 03/13.  Per EMS, patient had been feeling short of breath all day.  He was found to be hypoxic with increased work of breathing. En route to ED, he received 1 DuoNeb, Solu-Medrol 125mg, magnesium 2 g and 2.5 of Versed for acute agitation.  He was placed on CPAP.     Upon arrival to ED,  patient was tachypneic to 30s and hypertensive 150s/60s. He was transitioned from CPAP to BIPAP. Labs notable for WBC 12.8, Hg 12.5, Bicarb 34, , VBG 7.209/PCO2 104/PO2 27/PHCO3 41/BE 14+- while on Bipap. CXR w/interstitial coarsening- interstitial pneumonia vs.pulm edema. Admitted to MICU due to need for continuous bipap.           Overview/Hospital Course:  ICU Course:  Patient was started on azithro/rocephin for suspected CAP/ treatment for COPD exacerbation. Continued on prednisone 40mg qday. Breathing treatments set at every 6 hours. Patient was weaned to bipap at nighttime and w/naps. On 3L BNC. Most recent VBG w/pH 7.37/pCO2 30/pO2 30/pHCO3 43/BE 18. Deemed stable for transfer to hospital medicine 03/14.    Hospital Medicine Course:  Patient officially transferred to hospital medicine service 03/16. He was doing well- on home oxygen settings. Repeat ECG with normal Qtc. Patient was transitioned to levofloxacin to complete 5d course. Sent script to complete 5d of prednisone total. Outpatient follow-up scheduled w/PCP and pulmonology. Patient was stable for discharge to home 03/16, but had delays in arranging home bipap. Awaiting  approval from insurance company.           This encounter was provided through telemedicine.  Patient was transferred to the telemedicine service on:  03/17/2022   The patient location is: 654/654 A admitted 3/13/2022  7:17 PM.  Present with the patient at the time of the telemed/virtual assessment: Telepresenter    Interval History/Overnight Events:   Calm today and feels well.  Awaiting BIPAP for home; seen by pulmonary for clearance for EGD on 4/8    Review of Systems   Constitutional:  Negative for activity change, fatigue and fever.   Respiratory:  Positive for cough. Negative for shortness of breath.    Cardiovascular:  Negative for leg swelling.   Gastrointestinal:  Negative for abdominal pain and vomiting.      Inpatient Medications:  Scheduled Meds:   albuterol-ipratropium  3 mL Nebulization Q6H    carvediloL  12.5 mg Oral BID WM    enoxaparin  40 mg Subcutaneous Daily    furosemide  20 mg Oral Daily    nicotine  1 patch Transdermal Daily    pantoprazole  40 mg Oral BID    sucralfate  1 g Oral QID (AC & HS)     Continuous Infusions:  PRN Meds:.acetaminophen, calcium carbonate, cloNIDine, hydrALAZINE, influenza, magnesium hydroxide 400 mg/5 ml, simethicone, sodium chloride 0.9%      Objective:     Temp:  [97.3 °F (36.3 °C)-98.8 °F (37.1 °C)] 98.1 °F (36.7 °C)  Pulse:  [72-87] 77  Resp:  [14-20] 14  SpO2:  [92 %-98 %] 98 %  BP: (102-140)/(59-70) 130/70      Intake/Output Summary (Last 24 hours) at 3/26/2022 1103  Last data filed at 3/26/2022 0552  Gross per 24 hour   Intake 1120 ml   Output 170 ml   Net 950 ml          Body mass index is 23.62 kg/m².    Physical Exam  Vitals and nursing note reviewed.   Constitutional:       General: He is not in acute distress.     Appearance: Normal appearance. He is normal weight. He is not ill-appearing.   HENT:      Head: Normocephalic and atraumatic.      Right Ear: Hearing normal.      Left Ear: Hearing normal.      Nose: Nose normal.   Eyes:      General: No  scleral icterus.        Right eye: No discharge.         Left eye: No discharge.      Extraocular Movements: Extraocular movements intact.   Cardiovascular:      Rate and Rhythm: Normal rate.   Pulmonary:      Effort: Pulmonary effort is normal. No accessory muscle usage or respiratory distress.      Breath sounds: No wheezing (per RN exam).   Skin:     Findings: No rash.   Neurological:      General: No focal deficit present.      Mental Status: He is alert and oriented to person, place, and time.      Cranial Nerves: No cranial nerve deficit.      Motor: No weakness.   Psychiatric:         Attention and Perception: Attention normal.         Mood and Affect: Mood is depressed. Affect is blunt.         Speech: Speech is delayed.         Behavior: Behavior is cooperative.        Labs:  Recent Results (from the past 24 hour(s))   Renal function panel    Collection Time: 03/26/22  5:20 AM   Result Value Ref Range    Glucose 80 70 - 110 mg/dL    Sodium 135 (L) 136 - 145 mmol/L    Potassium 4.4 3.5 - 5.1 mmol/L    Chloride 97 95 - 110 mmol/L    CO2 33 (H) 23 - 29 mmol/L    BUN 12 6 - 20 mg/dL    Calcium 9.3 8.7 - 10.5 mg/dL    Creatinine 0.8 0.5 - 1.4 mg/dL    Albumin 2.6 (L) 3.5 - 5.2 g/dL    Phosphorus 3.4 2.7 - 4.5 mg/dL    eGFR if African American >60.0 >60 mL/min/1.73 m^2    eGFR if non African American >60.0 >60 mL/min/1.73 m^2    Anion Gap 5 (L) 8 - 16 mmol/L          Lab Results   Component Value Date    [id number] Negative 03/13/2022       Recent Labs   Lab 03/20/22  0445 03/21/22  0235 03/22/22  0300   WBC 8.67 8.78 7.85   LYMPH 34.9  3.0 31.1  2.7 30.3  2.4   HGB 11.9* 12.7* 13.3*   HCT 39.6* 41.2 42.9    260 239       Recent Labs   Lab 03/20/22  0445 03/21/22  0235 03/22/22  0300 03/24/22  0236 03/26/22  0520    140 135* 137 135*   K 4.3 4.3 4.1 4.1 4.4    98 97 98 97   CO2 31* 33* 30* 31* 33*   BUN 18 20 18 18 12   CREATININE 0.9 1.1 0.9 0.9 0.8   GLU 96 83 88 91 80   CALCIUM 8.8 9.1  9.2 8.9 9.3   MG 1.9 1.9 2.0  --   --    PHOS 3.0 3.2 2.9 2.7 3.4       Recent Labs   Lab 03/20/22  0445 03/21/22  0235 03/22/22  0300 03/24/22  0236 03/26/22  0520   ALKPHOS 76 69 65  --   --    ALT 24 23 18  --   --    AST 12 11 12  --   --    ALBUMIN 2.7* 2.8* 2.8* 2.6* 2.6*   PROT 5.7* 6.2 6.3  --   --    BILITOT 0.4 0.6 0.7  --   --           No results for input(s): DDIMER, FERRITIN, CRP, LDH, BNP, TROPONINI, CPK in the last 72 hours.    Invalid input(s): PROCALCITONIN    All labs within the last 24 hours were reviewed.     Microbiology:  Microbiology Results (last 7 days)       ** No results found for the last 168 hours. **              Imaging  ECG Results              EKG 12-lead (Final result)  Result time 03/14/22 16:40:40      Final result by Interface, Lab In [facility] (03/14/22 16:40:40)                   Narrative:    Test Reason : R06.02,    Vent. Rate : 102 BPM     Atrial Rate : 102 BPM     P-R Int : 160 ms          QRS Dur : 086 ms      QT Int : 354 ms       P-R-T Axes : 083 079 085 degrees     QTc Int : 461 ms    Sinus tachycardia  Right atrial enlargement  Borderline Abnormal ECG  When compared with ECG of 05-MAR-2022 15:30,  No significant change was found  Confirmed by Bryan DOBBINS MD ([id number]) on 3/14/2022 3:41:33 PM    Referred By: [email or web address]   SELF           Confirmed By:Bryan DOBBINS MD                                    Results for orders placed during the hospital encounter of 01/28/22    Echo    Interpretation Summary  · The left ventricle is normal in size with normal systolic function.  · Mild left atrial enlargement.  · There is pulmonary hypertension.  · The estimated ejection fraction is 55%.  · Grade I left ventricular diastolic dysfunction.  · Study quality was technically difficult  · Normal right ventricular size with normal right ventricular systolic function.  · Normal central venous pressure (3 mmHg).  · The estimated PA systolic pressure is 50 mmHg.  · Interatrial septal aneurysm  ·  Mild tricuspid regurgitation.      X-Ray Chest AP Portable  Narrative: EXAMINATION:  XR CHEST AP PORTABLE    CLINICAL HISTORY:  Shortness of breath    TECHNIQUE:  Single frontal view of the chest was performed.    COMPARISON:  Chest radiograph 03/05/2022    FINDINGS:  Monitoring leads and tubing overlie the chest.    Cardiomediastinal silhouette is midline and stable.  Pulmonary vasculature and hilar contours are unchanged.  Few scattered linear opacities throughout the lungs consistent with minimal platelike scarring versus atelectasis.  Bilateral diffuse nonspecific interstitial coarsening with a perihilar and upper lobe predominance slightly more so on the left, with central peribronchial cuffing and overall increased from 03/05/2022 study.  The lungs are otherwise symmetrically hyperexpanded without large consolidation, pleural effusion or pneumothorax.  No acute osseous process seen.  Impression: Left greater than right perihilar and upper lung zone predominant interval increased nonspecific interstitial coarsening which can be seen with worsening interstitial type pneumonia from inflammatory or infectious process including atypical bacterial or viral etiology versus more atypical distribution of pulmonary edema.  No large consolidation.    Electronically signed by: Mckinley Talbert MD  Date:    03/13/2022  Time:    19:56      All imaging within the last 24 hours was reviewed.       Discharge Planning   RUTH: 3/28/2022     Code Status: Full Code   Is the patient medically ready for discharge?: Yes    Reason for patient still in hospital (select all that apply): Patient trending condition and Pending disposition  Discharge Plan A: Home   Discharge Delays: (!) Payor Issues        Assessment/Plan:      * Acute on chronic respiratory failure with hypercapnia  Uses home O2 at 2 - 3 LPM  Needs BiPAP at home for nightly use - awaiting Medicaid approval  Doing better on 1 LPM O2    BiPAP (biphasic positive airway pressure)  dependence  Awaiting insurance approval for BiPAP    Epigastric pain  With history or gastric ulcer  Continued PPI. Added TUMS and Carafate 3/20  Persisting - trial of GI cocktail x1 effective; need to reschedule appointment with GI for repeat EGD as soon as possible - EGD scheduled on 4/8 - patient needs pulmonary clearance prior to EGD and if cannot be scheduled in outpatient clinic, the consult team will assess    Chronic diastolic heart failure  Control BP; continuing home medications  Monitor    Chronic obstructive pulmonary disease  Patient having difficulty using his home inhalers - PharmD assistance appreciated.    Tobacco abuse  - Cessation counseling  - Nicotine patch     Essential hypertension  - Continue home Coreg and Lasix    COPD exacerbation  Wean O2 as tolerated to keep O2 saturations >88%  -  Repeat ECG without prolonged QTc- stopped Rocephin and azithro and transition to levaquin to complete course  - Continued prednisone to complete course  - Scheduled duonebs q 6 hrs      VTE Risk Mitigation (From admission, onward)         Ordered     enoxaparin injection 40 mg  Daily         03/13/22 2246     IP VTE HIGH RISK PATIENT  Once         03/13/22 2246     Place sequential compression device  Until discontinued         03/13/22 2246              I have assessed these findings virtually using a telemed platform and with assistance of the bedside nurse.      The attending portion of this evaluation, treatment, and documentation was performed per Michelle Espino MD via Telemedicine AudioVisual using the secure [employer] software platform with 2 way audio/video. The provider was located off-site and the patient is located in the hospital. The aforementioned video software was utilized to document the relevant history and physical exam    Michelle Espino MD  Department of Hospital Medicine   [facility]

## 2022-03-27 NOTE — SUBJECTIVE & OBJECTIVE
This encounter was provided through telemedicine.  Patient was transferred to the telemedicine service on:  03/17/2022   The patient location is: 654/654 A admitted 3/13/2022  7:17 PM.  Present with the patient at the time of the telemed/virtual assessment: Telepresenter    Interval History/Overnight Events:   Continues to use BIPAP at night- on 1L oxygen by nasal cannula; uneventful night and denies dyspnea; patient anxious for discharge    Review of Systems   Constitutional:  Negative for activity change, fatigue and fever.   Respiratory:  Positive for cough. Negative for shortness of breath.    Cardiovascular:  Negative for leg swelling.   Gastrointestinal:  Negative for abdominal pain and vomiting.      Inpatient Medications:  Scheduled Meds:   albuterol-ipratropium  3 mL Nebulization Q6H    carvediloL  12.5 mg Oral BID WM    enoxaparin  40 mg Subcutaneous Daily    furosemide  20 mg Oral Daily    nicotine  1 patch Transdermal Daily    pantoprazole  40 mg Oral BID    sucralfate  1 g Oral QID (AC & HS)     Continuous Infusions:  PRN Meds:.acetaminophen, calcium carbonate, cloNIDine, hydrALAZINE, influenza, magnesium hydroxide 400 mg/5 ml, simethicone, sodium chloride 0.9%      Objective:     Temp:  [97.4 °F (36.3 °C)-98.9 °F (37.2 °C)] 98.3 °F (36.8 °C)  Pulse:  [75-93] 77  Resp:  [16-25] 18  SpO2:  [90 %-99 %] 94 %  BP: (111-132)/(66-81) 111/66      Intake/Output Summary (Last 24 hours) at 3/27/2022 1157  Last data filed at 3/27/2022 0500  Gross per 24 hour   Intake 600 ml   Output 150 ml   Net 450 ml          Body mass index is 22.72 kg/m².    Physical Exam  Vitals and nursing note reviewed.   Constitutional:       General: He is not in acute distress.     Appearance: Normal appearance. He is normal weight. He is not ill-appearing.   HENT:      Head: Normocephalic and atraumatic.      Right Ear: Hearing normal.      Left Ear: Hearing normal.      Nose: Nose normal.   Eyes:      Extraocular Movements:  Extraocular movements intact.   Cardiovascular:      Rate and Rhythm: Normal rate.   Pulmonary:      Effort: Pulmonary effort is normal. No accessory muscle usage or respiratory distress.      Breath sounds: No wheezing (per RN exam).   Skin:     Findings: No rash.   Neurological:      General: No focal deficit present.      Mental Status: He is alert and oriented to person, place, and time.      Cranial Nerves: No cranial nerve deficit.      Motor: No weakness.   Psychiatric:         Attention and Perception: Attention normal.         Mood and Affect: Mood is depressed. Affect is blunt.         Speech: Speech is delayed.         Behavior: Behavior is cooperative.        Labs:  No results found for this or any previous visit (from the past 24 hour(s)).         Lab Results   Component Value Date    [id number] Negative 03/13/2022       Recent Labs   Lab 03/21/22 0235 03/22/22  0300   WBC 8.78 7.85   LYMPH 31.1  2.7 30.3  2.4   HGB 12.7* 13.3*   HCT 41.2 42.9    239       Recent Labs   Lab 03/21/22  0235 03/22/22  0300 03/24/22  0236 03/26/22  0520    135* 137 135*   K 4.3 4.1 4.1 4.4   CL 98 97 98 97   CO2 33* 30* 31* 33*   BUN 20 18 18 12   CREATININE 1.1 0.9 0.9 0.8   GLU 83 88 91 80   CALCIUM 9.1 9.2 8.9 9.3   MG 1.9 2.0  --   --    PHOS 3.2 2.9 2.7 3.4       Recent Labs   Lab 03/21/22  0235 03/22/22  0300 03/24/22  0236 03/26/22  0520   ALKPHOS 69 65  --   --    ALT 23 18  --   --    AST 11 12  --   --    ALBUMIN 2.8* 2.8* 2.6* 2.6*   PROT 6.2 6.3  --   --    BILITOT 0.6 0.7  --   --           No results for input(s): DDIMER, FERRITIN, CRP, LDH, BNP, TROPONINI, CPK in the last 72 hours.    Invalid input(s): PROCALCITONIN    All labs within the last 24 hours were reviewed.     Microbiology:  Microbiology Results (last 7 days)       ** No results found for the last 168 hours. **              Imaging  ECG Results              EKG 12-lead (Final result)  Result time 03/14/22 16:40:40      Final  result by Interface, Lab In [facility] (03/14/22 16:40:40)                   Narrative:    Test Reason : R06.02,    Vent. Rate : 102 BPM     Atrial Rate : 102 BPM     P-R Int : 160 ms          QRS Dur : 086 ms      QT Int : 354 ms       P-R-T Axes : 083 079 085 degrees     QTc Int : 461 ms    Sinus tachycardia  Right atrial enlargement  Borderline Abnormal ECG  When compared with ECG of 05-MAR-2022 15:30,  No significant change was found  Confirmed by Bryan DOBBINS MD ([id number]) on 3/14/2022 3:41:33 PM    Referred By: [email or web address]   SELF           Confirmed By:Bryan DOBBINS MD                                    Results for orders placed during the hospital encounter of 01/28/22    Echo    Interpretation Summary  · The left ventricle is normal in size with normal systolic function.  · Mild left atrial enlargement.  · There is pulmonary hypertension.  · The estimated ejection fraction is 55%.  · Grade I left ventricular diastolic dysfunction.  · Study quality was technically difficult  · Normal right ventricular size with normal right ventricular systolic function.  · Normal central venous pressure (3 mmHg).  · The estimated PA systolic pressure is 50 mmHg.  · Interatrial septal aneurysm  · Mild tricuspid regurgitation.      X-Ray Chest AP Portable  Narrative: EXAMINATION:  XR CHEST AP PORTABLE    CLINICAL HISTORY:  Shortness of breath    TECHNIQUE:  Single frontal view of the chest was performed.    COMPARISON:  Chest radiograph 03/05/2022    FINDINGS:  Monitoring leads and tubing overlie the chest.    Cardiomediastinal silhouette is midline and stable.  Pulmonary vasculature and hilar contours are unchanged.  Few scattered linear opacities throughout the lungs consistent with minimal platelike scarring versus atelectasis.  Bilateral diffuse nonspecific interstitial coarsening with a perihilar and upper lobe predominance slightly more so on the left, with central peribronchial cuffing and overall increased from 03/05/2022 study.   The lungs are otherwise symmetrically hyperexpanded without large consolidation, pleural effusion or pneumothorax.  No acute osseous process seen.  Impression: Left greater than right perihilar and upper lung zone predominant interval increased nonspecific interstitial coarsening which can be seen with worsening interstitial type pneumonia from inflammatory or infectious process including atypical bacterial or viral etiology versus more atypical distribution of pulmonary edema.  No large consolidation.    Electronically signed by: Mckinley Talbert MD  Date:    03/13/2022  Time:    19:56      All imaging within the last 24 hours was reviewed.       Discharge Planning   RUTH: 3/28/2022     Code Status: Full Code   Is the patient medically ready for discharge?: Yes    Reason for patient still in hospital (select all that apply): Patient trending condition and Pending disposition  Discharge Plan A: Home   Discharge Delays: (!) Payor Issues

## 2022-03-28 LAB
ALBUMIN SERPL BCP-MCNC: 2.6 G/DL (ref 3.5–5.2)
ANION GAP SERPL CALC-SCNC: 10 MMOL/L (ref 8–16)
BUN SERPL-MCNC: 16 MG/DL (ref 6–20)
CALCIUM SERPL-MCNC: 9.2 MG/DL (ref 8.7–10.5)
CHLORIDE SERPL-SCNC: 97 MMOL/L (ref 95–110)
CO2 SERPL-SCNC: 30 MMOL/L (ref 23–29)
CREAT SERPL-MCNC: 0.8 MG/DL (ref 0.5–1.4)
EST. GFR  (AFRICAN AMERICAN): >60 ML/MIN/1.73 M^2
EST. GFR  (NON AFRICAN AMERICAN): >60 ML/MIN/1.73 M^2
GLUCOSE SERPL-MCNC: 91 MG/DL (ref 70–110)
PHOSPHATE SERPL-MCNC: 3.4 MG/DL (ref 2.7–4.5)
POTASSIUM SERPL-SCNC: 3.8 MMOL/L (ref 3.5–5.1)
SODIUM SERPL-SCNC: 137 MMOL/L (ref 136–145)

## 2022-03-28 PROCEDURE — 25000242 PHARM REV CODE 250 ALT 637 W/ HCPCS: Performed by: PHYSICIAN ASSISTANT

## 2022-03-28 PROCEDURE — 99232 PR SUBSEQUENT HOSPITAL CARE,LEVL II: ICD-10-PCS | Mod: 95,,, | Performed by: INTERNAL MEDICINE

## 2022-03-28 PROCEDURE — 25000003 PHARM REV CODE 250: Performed by: NURSE PRACTITIONER

## 2022-03-28 PROCEDURE — 11000001 HC ACUTE MED/SURG PRIVATE ROOM

## 2022-03-28 PROCEDURE — 94640 AIRWAY INHALATION TREATMENT: CPT

## 2022-03-28 PROCEDURE — 25000003 PHARM REV CODE 250: Performed by: PHYSICIAN ASSISTANT

## 2022-03-28 PROCEDURE — 99232 SBSQ HOSP IP/OBS MODERATE 35: CPT | Mod: 95,,, | Performed by: INTERNAL MEDICINE

## 2022-03-28 PROCEDURE — 94660 CPAP INITIATION&MGMT: CPT

## 2022-03-28 PROCEDURE — S4991 NICOTINE PATCH NONLEGEND: HCPCS | Performed by: INTERNAL MEDICINE

## 2022-03-28 PROCEDURE — 25000003 PHARM REV CODE 250: Performed by: INTERNAL MEDICINE

## 2022-03-28 PROCEDURE — 80069 RENAL FUNCTION PANEL: CPT | Performed by: INTERNAL MEDICINE

## 2022-03-28 PROCEDURE — 94761 N-INVAS EAR/PLS OXIMETRY MLT: CPT

## 2022-03-28 PROCEDURE — 27000221 HC OXYGEN, UP TO 24 HOURS

## 2022-03-28 PROCEDURE — 36415 COLL VENOUS BLD VENIPUNCTURE: CPT | Performed by: INTERNAL MEDICINE

## 2022-03-28 PROCEDURE — 99900035 HC TECH TIME PER 15 MIN (STAT)

## 2022-03-28 RX ORDER — TIOTROPIUM BROMIDE INHALATION SPRAY 3.12 UG/1
5 SPRAY, METERED RESPIRATORY (INHALATION) DAILY
Qty: 4 G | Refills: 5 | Status: SHIPPED | OUTPATIENT
Start: 2022-03-28 | End: 2022-03-31 | Stop reason: HOSPADM

## 2022-03-28 RX ADMIN — SUCRALFATE 1 G: 1 TABLET ORAL at 08:03

## 2022-03-28 RX ADMIN — PANTOPRAZOLE SODIUM 40 MG: 40 TABLET, DELAYED RELEASE ORAL at 08:03

## 2022-03-28 RX ADMIN — IPRATROPIUM BROMIDE AND ALBUTEROL SULFATE 3 ML: 2.5; .5 SOLUTION RESPIRATORY (INHALATION) at 02:03

## 2022-03-28 RX ADMIN — SUCRALFATE 1 G: 1 TABLET ORAL at 05:03

## 2022-03-28 RX ADMIN — SUCRALFATE 1 G: 1 TABLET ORAL at 12:03

## 2022-03-28 RX ADMIN — CARVEDILOL 12.5 MG: 12.5 TABLET, FILM COATED ORAL at 05:03

## 2022-03-28 RX ADMIN — IPRATROPIUM BROMIDE AND ALBUTEROL SULFATE 3 ML: 2.5; .5 SOLUTION RESPIRATORY (INHALATION) at 07:03

## 2022-03-28 RX ADMIN — IPRATROPIUM BROMIDE AND ALBUTEROL SULFATE 3 ML: 2.5; .5 SOLUTION RESPIRATORY (INHALATION) at 09:03

## 2022-03-28 RX ADMIN — NICOTINE 1 PATCH: 14 PATCH, EXTENDED RELEASE TRANSDERMAL at 08:03

## 2022-03-28 RX ADMIN — CARVEDILOL 12.5 MG: 12.5 TABLET, FILM COATED ORAL at 08:03

## 2022-03-28 RX ADMIN — IPRATROPIUM BROMIDE AND ALBUTEROL SULFATE 3 ML: 2.5; .5 SOLUTION RESPIRATORY (INHALATION) at 12:03

## 2022-03-28 RX ADMIN — ACETAMINOPHEN 650 MG: 325 TABLET ORAL at 05:03

## 2022-03-28 RX ADMIN — FUROSEMIDE 20 MG: 20 TABLET ORAL at 08:03

## 2022-03-28 NOTE — PLAN OF CARE
Went over plan of care - all questions answered. Pt states he is ready to go home. No falls or injuries. Will continue to monitor

## 2022-03-28 NOTE — PLAN OF CARE
[facility] in contact with Medicaid to follow up on appeal status for insurance approval for patient's Bipap. The appeal is still in review pending and it could take 14-21 days for a response from Medicaid. MD and CM supervisors aware of above. Will continue to follow.    Galina Beasley RN  Ext [phone]

## 2022-03-28 NOTE — PROGRESS NOTES
"[facility]  Adult Nutrition  Progress Note    SUMMARY       Recommendations    1. Continue Cardiac diet, fluids per MD     2. If PO intake <50%, add Boost Plus BID     3. RD to follow and monitor    Goals: Pt will continue to meet and tolerate >75% EEN/EPN by RD f/u  Nutrition Goal Status: new    Communication of RD Recs:  (POC)    Assessment and Plan    No nutrition diagnosis at this time.     Reason for Assessment    Reason For Assessment: length of stay  Diagnosis:  (Respiratory failure)  Relevant Medical History: HTN, COPD, PNA, CAD, CHF    General Information Comments: Pt presents with respiratory failure. Responses are somewhat delayed, pt quite blunt. Good appetite now and PTA following Low Na diet at home. Tolerating 100% meals since admit. No c/o n/v/d/c or difficulties chewing/swallowing. UBW ~140#; 6# wt gain noted x 3 months. NFPE not warranted; pt appears small but nourished with no s/s of malnutrition.    Nutrition Discharge Planning: Cardiac diet    Nutrition Risk Screen    Nutrition Risk Screen: no indicators present    Nutrition/Diet History    Patient Reported Diet/Restrictions/Preferences: low salt  Spiritual, Cultural Beliefs, Sabianism Practices, Values that Affect Care: no  Food Allergies: NKFA  Factors Affecting Nutritional Intake: None identified at this time    Anthropometrics    Temp: 98.4 °F (36.9 °C)  Height Method: Stated  Height: 5' 7" (170.2 cm)  Height (inches): 67 in  Weight Method: Bed Scale  Weight: 65.8 kg (145 lb 1 oz)  Weight (lb): 145.06 lb  Ideal Body Weight (IBW), Male: 148 lb  % Ideal Body Weight, Male (lb): 101.29 %  BMI (Calculated): 22.7  BMI Grade: 18.5-24.9 - normal  Usual Body Weight (UBW), k.64 kg  % Usual Body Weight: 103.61       Lab/Procedures/Meds    Pertinent Labs Reviewed: reviewed  Pertinent Labs Comments: Alb 2.6  Pertinent Medications Reviewed: reviewed  Pertinent Medications Comments: lasix, pantoprazole, nicotine    Estimated/Assessed " Needs    Weight Used For Calorie Calculations: 65.8 kg (145 lb)  Energy Calorie Requirements (kcal): 8315-1213 kcal/day  Energy Need Method: Kcal/kg (25-30 kcal/kg)  Protein Requirements: 66-79 g/day (1.0-1.2 g/kg)  Weight Used For Protein Calculations: 65.8 kg (145 lb)  Fluid Requirements (mL): 1 mL/kcal or per MD  Estimated Fluid Requirement Method: RDA Method  RDA Method (mL): 1644       Nutrition Prescription Ordered    Current Diet Order: Cardiac    Evaluation of Received Nutrient/Fluid Intake    I/O: +2.5L since admit  Energy Calories Required: meeting needs  Protein Required: meeting needs  Comments: LBM 3/27  Tolerance: tolerating  % Intake of Estimated Energy Needs: 75 - 100 %  % Meal Intake: 75 - 100 %    Nutrition Risk    Level of Risk/Frequency of Follow-up: low     Monitor and Evaluation    Food and Nutrient Intake: energy intake, food and beverage intake  Food and Nutrient Adminstration: diet order  Knowledge/Beliefs/Attitudes: food and nutrition knowledge/skill  Physical Activity and Function: nutrition-related ADLs and IADLs  Anthropometric Measurements: weight change, weight  Biochemical Data, Medical Tests and Procedures: electrolyte and renal panel, glucose/endocrine profile, gastrointestinal profile, inflammatory profile, lipid profile  Nutrition-Focused Physical Findings: overall appearance     Nutrition Follow-Up    RD Follow-up?: Yes

## 2022-03-28 NOTE — PLAN OF CARE
Recommendations    1. Continue Cardiac diet, fluids per MD     2. If PO intake <50%, add Boost Plus BID     3. RD to follow and monitor    Goals: Pt will continue to meet and tolerate >75% EEN/EPN by RD f/u  Nutrition Goal Status: new    Communication of RD Recs:  (POC)    Swathi Ochoa MS, RD, LDN  Ext: [phone]

## 2022-03-28 NOTE — PROGRESS NOTES
[facility] - [facility] Medicine  Telemedicine Progress Note    Patient Name: Baltazar Mccray  MRN: [id number]  Patient Class: IP- Inpatient   Admission Date: 3/13/2022  Length of Stay: 14 days  Attending Physician: Michelle Espino MD  Primary Care Provider: Rhonda Of Katia          Subjective:     Principal Problem:Acute on chronic respiratory failure with hypercapnia        HPI:  Mei Mccray 61yoM w/PMHx of CHF, COPD, HTN, AFib who presented to [facility] w/ shortness of breath 03/13.  Per EMS, patient had been feeling short of breath all day.  He was found to be hypoxic with increased work of breathing. En route to ED, he received 1 DuoNeb, Solu-Medrol 125mg, magnesium 2 g and 2.5 of Versed for acute agitation.  He was placed on CPAP.     Upon arrival to ED,  patient was tachypneic to 30s and hypertensive 150s/60s. He was transitioned from CPAP to BIPAP. Labs notable for WBC 12.8, Hg 12.5, Bicarb 34, , VBG 7.209/PCO2 104/PO2 27/PHCO3 41/BE 14+- while on Bipap. CXR w/interstitial coarsening- interstitial pneumonia vs.pulm edema. Admitted to MICU due to need for continuous bipap.           Overview/Hospital Course:  ICU Course:  Patient was started on azithro/rocephin for suspected CAP/ treatment for COPD exacerbation. Continued on prednisone 40mg qday. Breathing treatments set at every 6 hours. Patient was weaned to bipap at nighttime and w/naps. On 3L BNC. Most recent VBG w/pH 7.37/pCO2 30/pO2 30/pHCO3 43/BE 18. Deemed stable for transfer to hospital medicine 03/14.    Hospital Medicine Course:  Patient officially transferred to hospital medicine service 03/16. He was doing well- on home oxygen settings. Repeat ECG with normal Qtc. Patient was transitioned to levofloxacin to complete 5d course. Sent script to complete 5d of prednisone total. Outpatient follow-up scheduled w/PCP and pulmonology. Patient was stable for discharge to home 03/16, but had delays in arranging home bipap. Awaiting  approval from insurance company.           This encounter was provided through telemedicine.  Patient was transferred to the telemedicine service on:  03/17/2022   The patient location is: 654/654 A admitted 3/13/2022  7:17 PM.  Present with the patient at the time of the telemed/virtual assessment: Telepresenter    Interval History/Overnight Events:   Continues to use BIPAP at night- on 1L oxygen by nasal cannula; uneventful night and denies dyspnea; patient anxious for discharge    Review of Systems   Constitutional:  Negative for activity change, fatigue and fever.   Respiratory:  Positive for cough. Negative for shortness of breath.    Cardiovascular:  Negative for leg swelling.   Gastrointestinal:  Negative for abdominal pain and vomiting.      Inpatient Medications:  Scheduled Meds:   albuterol-ipratropium  3 mL Nebulization Q6H    carvediloL  12.5 mg Oral BID WM    enoxaparin  40 mg Subcutaneous Daily    furosemide  20 mg Oral Daily    nicotine  1 patch Transdermal Daily    pantoprazole  40 mg Oral BID    sucralfate  1 g Oral QID (AC & HS)     Continuous Infusions:  PRN Meds:.acetaminophen, calcium carbonate, cloNIDine, hydrALAZINE, influenza, magnesium hydroxide 400 mg/5 ml, simethicone, sodium chloride 0.9%      Objective:     Temp:  [97.4 °F (36.3 °C)-98.9 °F (37.2 °C)] 98.3 °F (36.8 °C)  Pulse:  [75-93] 77  Resp:  [16-25] 18  SpO2:  [90 %-99 %] 94 %  BP: (111-132)/(66-81) 111/66      Intake/Output Summary (Last 24 hours) at 3/27/2022 1157  Last data filed at 3/27/2022 0500  Gross per 24 hour   Intake 600 ml   Output 150 ml   Net 450 ml          Body mass index is 22.72 kg/m².    Physical Exam  Vitals and nursing note reviewed.   Constitutional:       General: He is not in acute distress.     Appearance: Normal appearance. He is normal weight. He is not ill-appearing.   HENT:      Head: Normocephalic and atraumatic.      Right Ear: Hearing normal.      Left Ear: Hearing normal.      Nose: Nose  normal.   Eyes:      Extraocular Movements: Extraocular movements intact.   Cardiovascular:      Rate and Rhythm: Normal rate.   Pulmonary:      Effort: Pulmonary effort is normal. No accessory muscle usage or respiratory distress.      Breath sounds: No wheezing (per RN exam).   Skin:     Findings: No rash.   Neurological:      General: No focal deficit present.      Mental Status: He is alert and oriented to person, place, and time.      Cranial Nerves: No cranial nerve deficit.      Motor: No weakness.   Psychiatric:         Attention and Perception: Attention normal.         Mood and Affect: Mood is depressed. Affect is blunt.         Speech: Speech is delayed.         Behavior: Behavior is cooperative.        Labs:  No results found for this or any previous visit (from the past 24 hour(s)).         Lab Results   Component Value Date    [id number] Negative 03/13/2022       Recent Labs   Lab 03/21/22  0235 03/22/22  0300   WBC 8.78 7.85   LYMPH 31.1  2.7 30.3  2.4   HGB 12.7* 13.3*   HCT 41.2 42.9    239       Recent Labs   Lab 03/21/22  0235 03/22/22  0300 03/24/22  0236 03/26/22  0520    135* 137 135*   K 4.3 4.1 4.1 4.4   CL 98 97 98 97   CO2 33* 30* 31* 33*   BUN 20 18 18 12   CREATININE 1.1 0.9 0.9 0.8   GLU 83 88 91 80   CALCIUM 9.1 9.2 8.9 9.3   MG 1.9 2.0  --   --    PHOS 3.2 2.9 2.7 3.4       Recent Labs   Lab 03/21/22  0235 03/22/22  0300 03/24/22  0236 03/26/22  0520   ALKPHOS 69 65  --   --    ALT 23 18  --   --    AST 11 12  --   --    ALBUMIN 2.8* 2.8* 2.6* 2.6*   PROT 6.2 6.3  --   --    BILITOT 0.6 0.7  --   --           No results for input(s): DDIMER, FERRITIN, CRP, LDH, BNP, TROPONINI, CPK in the last 72 hours.    Invalid input(s): PROCALCITONIN    All labs within the last 24 hours were reviewed.     Microbiology:  Microbiology Results (last 7 days)       ** No results found for the last 168 hours. **              Imaging  ECG Results              EKG 12-lead (Final result)   Result time 03/14/22 16:40:40      Final result by Interface, Lab In [facility] (03/14/22 16:40:40)                   Narrative:    Test Reason : R06.02,    Vent. Rate : 102 BPM     Atrial Rate : 102 BPM     P-R Int : 160 ms          QRS Dur : 086 ms      QT Int : 354 ms       P-R-T Axes : 083 079 085 degrees     QTc Int : 461 ms    Sinus tachycardia  Right atrial enlargement  Borderline Abnormal ECG  When compared with ECG of 05-MAR-2022 15:30,  No significant change was found  Confirmed by Bryan DOBBINS MD ([id number]) on 3/14/2022 3:41:33 PM    Referred By: [email or web address]   SELF           Confirmed By:Bryan DOBBINS MD                                    Results for orders placed during the hospital encounter of 01/28/22    Echo    Interpretation Summary  · The left ventricle is normal in size with normal systolic function.  · Mild left atrial enlargement.  · There is pulmonary hypertension.  · The estimated ejection fraction is 55%.  · Grade I left ventricular diastolic dysfunction.  · Study quality was technically difficult  · Normal right ventricular size with normal right ventricular systolic function.  · Normal central venous pressure (3 mmHg).  · The estimated PA systolic pressure is 50 mmHg.  · Interatrial septal aneurysm  · Mild tricuspid regurgitation.      X-Ray Chest AP Portable  Narrative: EXAMINATION:  XR CHEST AP PORTABLE    CLINICAL HISTORY:  Shortness of breath    TECHNIQUE:  Single frontal view of the chest was performed.    COMPARISON:  Chest radiograph 03/05/2022    FINDINGS:  Monitoring leads and tubing overlie the chest.    Cardiomediastinal silhouette is midline and stable.  Pulmonary vasculature and hilar contours are unchanged.  Few scattered linear opacities throughout the lungs consistent with minimal platelike scarring versus atelectasis.  Bilateral diffuse nonspecific interstitial coarsening with a perihilar and upper lobe predominance slightly more so on the left, with central peribronchial cuffing and  overall increased from 03/05/2022 study.  The lungs are otherwise symmetrically hyperexpanded without large consolidation, pleural effusion or pneumothorax.  No acute osseous process seen.  Impression: Left greater than right perihilar and upper lung zone predominant interval increased nonspecific interstitial coarsening which can be seen with worsening interstitial type pneumonia from inflammatory or infectious process including atypical bacterial or viral etiology versus more atypical distribution of pulmonary edema.  No large consolidation.    Electronically signed by: Mckinley Talbert MD  Date:    03/13/2022  Time:    19:56      All imaging within the last 24 hours was reviewed.       Discharge Planning   RUTH: 3/28/2022     Code Status: Full Code   Is the patient medically ready for discharge?: Yes    Reason for patient still in hospital (select all that apply): Patient trending condition and Pending disposition  Discharge Plan A: Home   Discharge Delays: (!) Payor Issues        Assessment/Plan:      * Acute on chronic respiratory failure with hypercapnia  Uses home O2 at 2 - 3 LPM  Needs BiPAP at home for nightly use - awaiting Medicaid approval  Doing better on 1 LPM O2    BiPAP (biphasic positive airway pressure) dependence  Awaiting insurance approval for BiPAP    Epigastric pain  With history or gastric ulcer  Continued PPI. Added TUMS and Carafate 3/20  Persisting - trial of GI cocktail x1 effective; need to reschedule appointment with GI for repeat EGD as soon as possible - EGD scheduled on 4/8 - patient needs pulmonary clearance prior to EGD and if cannot be scheduled in outpatient clinic, the consult team will assess    Chronic diastolic heart failure  Control BP; continuing home medications  Monitor    Chronic obstructive pulmonary disease  Patient having difficulty using his home inhalers - PharmD assistance appreciated.    Tobacco abuse  - Cessation counseling  - Nicotine patch     Essential  hypertension  - Continue home Coreg and Lasix    COPD exacerbation  Wean O2 as tolerated to keep O2 saturations >88%  -  Repeat ECG without prolonged QTc- stopped Rocephin and azithro and transition to levaquin to complete course  - Continued prednisone to complete course  - Scheduled duonebs q 6 hrs      VTE Risk Mitigation (From admission, onward)         Ordered     enoxaparin injection 40 mg  Daily         03/13/22 2246     IP VTE HIGH RISK PATIENT  Once         03/13/22 2246     Place sequential compression device  Until discontinued         03/13/22 2246                I have assessed these findings virtually using a telemed platform and with assistance of the bedside nurse.        The attending portion of this evaluation, treatment, and documentation was performed per Michelle Espino MD via Telemedicine AudioVisual using the secure [employer] software platform with 2 way audio/video. The provider was located off-site and the patient is located in the hospital. The aforementioned video software was utilized to document the relevant history and physical exam    Michelle Espino MD  Department of Hospital Medicine   [facility]

## 2022-03-28 NOTE — SUBJECTIVE & OBJECTIVE
This encounter was provided through telemedicine.  Patient was transferred to the telemedicine service on:  03/17/2022   The patient location is: 654/654 A admitted 3/13/2022  7:17 PM.  Present with the patient at the time of the telemed/virtual assessment: Telepresenter    Interval History/Overnight Events:   Unchanged from previous:   Continues to use BIPAP at night- on 1L oxygen by nasal cannula; uneventful night and denies dyspnea; patient anxious for discharge  -notified that insurance approval may take 15-21 days for BIPAP - patient does not wish to stay until machine provided so may be discharged prior to BIPAP provided for home; discussed with Pulmonary and unfortunately no other alternatives currently    Review of Systems   Constitutional:  Negative for activity change, fatigue and fever.   Respiratory:  Negative for shortness of breath.    Cardiovascular:  Negative for leg swelling.   Gastrointestinal:  Negative for abdominal pain and vomiting.      Inpatient Medications:  Scheduled Meds:   albuterol-ipratropium  3 mL Nebulization Q6H    carvediloL  12.5 mg Oral BID WM    enoxaparin  40 mg Subcutaneous Daily    furosemide  20 mg Oral Daily    nicotine  1 patch Transdermal Daily    pantoprazole  40 mg Oral BID    sucralfate  1 g Oral QID (AC & HS)     Continuous Infusions:  PRN Meds:.acetaminophen, calcium carbonate, cloNIDine, hydrALAZINE, influenza, magnesium hydroxide 400 mg/5 ml, simethicone, sodium chloride 0.9%      Objective:     Temp:  [98.2 °F (36.8 °C)-98.8 °F (37.1 °C)] 98.4 °F (36.9 °C)  Pulse:  [76-88] 88  Resp:  [17-26] 22  SpO2:  [91 %-96 %] 93 %  BP: (109-132)/(64-74) 114/71      Intake/Output Summary (Last 24 hours) at 3/28/2022 1018  Last data filed at 3/28/2022 0500  Gross per 24 hour   Intake 500 ml   Output --   Net 500 ml          Body mass index is 22.72 kg/m².    Physical Exam  Vitals and nursing note reviewed.   Constitutional:       General: He is not in acute distress.      Appearance: Normal appearance. He is normal weight. He is not ill-appearing.   HENT:      Head: Normocephalic and atraumatic.      Right Ear: Hearing normal.      Left Ear: Hearing normal.      Nose: Nose normal.   Eyes:      Extraocular Movements: Extraocular movements intact.   Cardiovascular:      Rate and Rhythm: Normal rate.   Pulmonary:      Effort: Pulmonary effort is normal. No accessory muscle usage or respiratory distress.      Breath sounds: No wheezing (per RN exam).   Skin:     Findings: No rash.   Neurological:      General: No focal deficit present.      Mental Status: He is alert and oriented to person, place, and time.      Cranial Nerves: No cranial nerve deficit.      Motor: No weakness.   Psychiatric:         Attention and Perception: Attention normal.         Mood and Affect: Mood is depressed. Affect is blunt.         Speech: Speech is delayed.         Behavior: Behavior is cooperative.        Labs:  Recent Results (from the past 24 hour(s))   Renal function panel    Collection Time: 03/28/22  2:45 AM   Result Value Ref Range    Glucose 91 70 - 110 mg/dL    Sodium 137 136 - 145 mmol/L    Potassium 3.8 3.5 - 5.1 mmol/L    Chloride 97 95 - 110 mmol/L    CO2 30 (H) 23 - 29 mmol/L    BUN 16 6 - 20 mg/dL    Calcium 9.2 8.7 - 10.5 mg/dL    Creatinine 0.8 0.5 - 1.4 mg/dL    Albumin 2.6 (L) 3.5 - 5.2 g/dL    Phosphorus 3.4 2.7 - 4.5 mg/dL    eGFR if African American >60.0 >60 mL/min/1.73 m^2    eGFR if non African American >60.0 >60 mL/min/1.73 m^2    Anion Gap 10 8 - 16 mmol/L            Lab Results   Component Value Date    [id number] Negative 03/13/2022       Recent Labs   Lab 03/22/22  0300   WBC 7.85   LYMPH 30.3  2.4   HGB 13.3*   HCT 42.9          Recent Labs   Lab 03/22/22  0300 03/24/22  0236 03/26/22  0520 03/28/22  0245   * 137 135* 137   K 4.1 4.1 4.4 3.8   CL 97 98 97 97   CO2 30* 31* 33* 30*   BUN 18 18 12 16   CREATININE 0.9 0.9 0.8 0.8   GLU 88 91 80 91   CALCIUM 9.2 8.9  9.3 9.2   MG 2.0  --   --   --    PHOS 2.9 2.7 3.4 3.4       Recent Labs   Lab 03/22/22  0300 03/24/22  0236 03/26/22  0520 03/28/22  0245   ALKPHOS 65  --   --   --    ALT 18  --   --   --    AST 12  --   --   --    ALBUMIN 2.8* 2.6* 2.6* 2.6*   PROT 6.3  --   --   --    BILITOT 0.7  --   --   --           No results for input(s): DDIMER, FERRITIN, CRP, LDH, BNP, TROPONINI, CPK in the last 72 hours.    Invalid input(s): PROCALCITONIN    All labs within the last 24 hours were reviewed.     Microbiology:  Microbiology Results (last 7 days)       ** No results found for the last 168 hours. **              Imaging  ECG Results              EKG 12-lead (Final result)  Result time 03/14/22 16:40:40      Final result by Interface, Lab In [facility] (03/14/22 16:40:40)                   Narrative:    Test Reason : R06.02,    Vent. Rate : 102 BPM     Atrial Rate : 102 BPM     P-R Int : 160 ms          QRS Dur : 086 ms      QT Int : 354 ms       P-R-T Axes : 083 079 085 degrees     QTc Int : 461 ms    Sinus tachycardia  Right atrial enlargement  Borderline Abnormal ECG  When compared with ECG of 05-MAR-2022 15:30,  No significant change was found  Confirmed by Bryan DOBBINS MD ([id number]) on 3/14/2022 3:41:33 PM    Referred By: [email or web address]   SELF           Confirmed By:Bryan DOBBINS MD                                    Results for orders placed during the hospital encounter of 01/28/22    Echo    Interpretation Summary  · The left ventricle is normal in size with normal systolic function.  · Mild left atrial enlargement.  · There is pulmonary hypertension.  · The estimated ejection fraction is 55%.  · Grade I left ventricular diastolic dysfunction.  · Study quality was technically difficult  · Normal right ventricular size with normal right ventricular systolic function.  · Normal central venous pressure (3 mmHg).  · The estimated PA systolic pressure is 50 mmHg.  · Interatrial septal aneurysm  · Mild tricuspid regurgitation.      X-Ray  Chest AP Portable  Narrative: EXAMINATION:  XR CHEST AP PORTABLE    CLINICAL HISTORY:  Shortness of breath    TECHNIQUE:  Single frontal view of the chest was performed.    COMPARISON:  Chest radiograph 03/05/2022    FINDINGS:  Monitoring leads and tubing overlie the chest.    Cardiomediastinal silhouette is midline and stable.  Pulmonary vasculature and hilar contours are unchanged.  Few scattered linear opacities throughout the lungs consistent with minimal platelike scarring versus atelectasis.  Bilateral diffuse nonspecific interstitial coarsening with a perihilar and upper lobe predominance slightly more so on the left, with central peribronchial cuffing and overall increased from 03/05/2022 study.  The lungs are otherwise symmetrically hyperexpanded without large consolidation, pleural effusion or pneumothorax.  No acute osseous process seen.  Impression: Left greater than right perihilar and upper lung zone predominant interval increased nonspecific interstitial coarsening which can be seen with worsening interstitial type pneumonia from inflammatory or infectious process including atypical bacterial or viral etiology versus more atypical distribution of pulmonary edema.  No large consolidation.    Electronically signed by: Mckinley Talbert MD  Date:    03/13/2022  Time:    19:56      All imaging within the last 24 hours was reviewed.       Discharge Planning   RUTH: 3/29/2022     Code Status: Full Code   Is the patient medically ready for discharge?: Yes    Reason for patient still in hospital (select all that apply): Patient trending condition and Pending disposition  Discharge Plan A: Home   Discharge Delays: (!) Payor Issues

## 2022-03-29 PROCEDURE — 25000003 PHARM REV CODE 250: Performed by: INTERNAL MEDICINE

## 2022-03-29 PROCEDURE — 94640 AIRWAY INHALATION TREATMENT: CPT

## 2022-03-29 PROCEDURE — S4991 NICOTINE PATCH NONLEGEND: HCPCS | Performed by: INTERNAL MEDICINE

## 2022-03-29 PROCEDURE — 27000221 HC OXYGEN, UP TO 24 HOURS

## 2022-03-29 PROCEDURE — 11000001 HC ACUTE MED/SURG PRIVATE ROOM

## 2022-03-29 PROCEDURE — 99232 PR SUBSEQUENT HOSPITAL CARE,LEVL II: ICD-10-PCS | Mod: 95,,, | Performed by: INTERNAL MEDICINE

## 2022-03-29 PROCEDURE — 25000003 PHARM REV CODE 250: Performed by: PHYSICIAN ASSISTANT

## 2022-03-29 PROCEDURE — 25000242 PHARM REV CODE 250 ALT 637 W/ HCPCS: Performed by: PHYSICIAN ASSISTANT

## 2022-03-29 PROCEDURE — 25000003 PHARM REV CODE 250: Performed by: NURSE PRACTITIONER

## 2022-03-29 PROCEDURE — 94660 CPAP INITIATION&MGMT: CPT

## 2022-03-29 PROCEDURE — 94761 N-INVAS EAR/PLS OXIMETRY MLT: CPT

## 2022-03-29 PROCEDURE — 99900035 HC TECH TIME PER 15 MIN (STAT)

## 2022-03-29 PROCEDURE — 99232 SBSQ HOSP IP/OBS MODERATE 35: CPT | Mod: 95,,, | Performed by: INTERNAL MEDICINE

## 2022-03-29 RX ADMIN — IPRATROPIUM BROMIDE AND ALBUTEROL SULFATE 3 ML: 2.5; .5 SOLUTION RESPIRATORY (INHALATION) at 07:03

## 2022-03-29 RX ADMIN — SUCRALFATE 1 G: 1 TABLET ORAL at 05:03

## 2022-03-29 RX ADMIN — PANTOPRAZOLE SODIUM 40 MG: 40 TABLET, DELAYED RELEASE ORAL at 08:03

## 2022-03-29 RX ADMIN — NICOTINE 1 PATCH: 14 PATCH, EXTENDED RELEASE TRANSDERMAL at 08:03

## 2022-03-29 RX ADMIN — IPRATROPIUM BROMIDE AND ALBUTEROL SULFATE 3 ML: 2.5; .5 SOLUTION RESPIRATORY (INHALATION) at 01:03

## 2022-03-29 RX ADMIN — SUCRALFATE 1 G: 1 TABLET ORAL at 12:03

## 2022-03-29 RX ADMIN — SUCRALFATE 1 G: 1 TABLET ORAL at 07:03

## 2022-03-29 RX ADMIN — CARVEDILOL 12.5 MG: 12.5 TABLET, FILM COATED ORAL at 05:03

## 2022-03-29 RX ADMIN — FUROSEMIDE 20 MG: 20 TABLET ORAL at 08:03

## 2022-03-29 RX ADMIN — ACETAMINOPHEN 650 MG: 325 TABLET ORAL at 05:03

## 2022-03-29 RX ADMIN — CARVEDILOL 12.5 MG: 12.5 TABLET, FILM COATED ORAL at 08:03

## 2022-03-29 RX ADMIN — IPRATROPIUM BROMIDE AND ALBUTEROL SULFATE 3 ML: 2.5; .5 SOLUTION RESPIRATORY (INHALATION) at 12:03

## 2022-03-29 RX ADMIN — SUCRALFATE 1 G: 1 TABLET ORAL at 08:03

## 2022-03-29 NOTE — PROGRESS NOTES
[facility] - [facility] Medicine  Telemedicine Progress Note    Patient Name: Baltazar Mccray  MRN: [id number]  Patient Class: IP- Inpatient   Admission Date: 3/13/2022  Length of Stay: 15 days  Attending Physician: Michelle Espino MD  Primary Care Provider: Jaden          Subjective:     Principal Problem:<principal problem not specified>        HPI:  Mei Mccray 61yoM w/PMHx of CHF, COPD, HTN, AFib who presented to [facility] w/ shortness of breath 03/13.  Per EMS, patient had been feeling short of breath all day.  He was found to be hypoxic with increased work of breathing. En route to ED, he received 1 DuoNeb, Solu-Medrol 125mg, magnesium 2 g and 2.5 of Versed for acute agitation.  He was placed on CPAP.     Upon arrival to ED,  patient was tachypneic to 30s and hypertensive 150s/60s. He was transitioned from CPAP to BIPAP. Labs notable for WBC 12.8, Hg 12.5, Bicarb 34, , VBG 7.209/PCO2 104/PO2 27/PHCO3 41/BE 14+- while on Bipap. CXR w/interstitial coarsening- interstitial pneumonia vs.pulm edema. Admitted to MICU due to need for continuous bipap.           Overview/Hospital Course:  ICU Course:  Patient was started on azithro/rocephin for suspected CAP/ treatment for COPD exacerbation. Continued on prednisone 40mg qday. Breathing treatments set at every 6 hours. Patient was weaned to bipap at nighttime and w/naps. On 3L BNC. Most recent VBG w/pH 7.37/pCO2 30/pO2 30/pHCO3 43/BE 18. Deemed stable for transfer to hospital medicine 03/14.    Hospital Medicine Course:  Patient officially transferred to hospital medicine service 03/16. He was doing well- on home oxygen settings. Repeat ECG with normal Qtc. Patient was transitioned to levofloxacin to complete 5d course. Sent script to complete 5d of prednisone total. Outpatient follow-up scheduled w/PCP and pulmonology. Patient was stable for discharge to home 03/16, but had delays in arranging home bipap. Awaiting approval from  insurance company.           This encounter was provided through telemedicine.  Patient was transferred to the telemedicine service on:  03/17/2022   The patient location is: 654/654 A admitted 3/13/2022  7:17 PM.  Present with the patient at the time of the telemed/virtual assessment: Telepresenter    Interval History/Overnight Events:   Unchanged from previous:   Continues to use BIPAP at night- on 1L oxygen by nasal cannula; uneventful night and denies dyspnea; patient anxious for discharge  -notified that insurance approval may take 15-21 days for BIPAP - patient does not wish to stay until machine provided so may be discharged prior to BIPAP provided for home; discussed with Pulmonary and unfortunately no other alternatives currently    Review of Systems   Constitutional:  Negative for activity change, fatigue and fever.   Respiratory:  Negative for shortness of breath.    Cardiovascular:  Negative for leg swelling.   Gastrointestinal:  Negative for abdominal pain and vomiting.      Inpatient Medications:  Scheduled Meds:   albuterol-ipratropium  3 mL Nebulization Q6H    carvediloL  12.5 mg Oral BID WM    enoxaparin  40 mg Subcutaneous Daily    furosemide  20 mg Oral Daily    nicotine  1 patch Transdermal Daily    pantoprazole  40 mg Oral BID    sucralfate  1 g Oral QID (AC & HS)     Continuous Infusions:  PRN Meds:.acetaminophen, calcium carbonate, cloNIDine, hydrALAZINE, influenza, magnesium hydroxide 400 mg/5 ml, simethicone, sodium chloride 0.9%      Objective:     Temp:  [98.2 °F (36.8 °C)-98.8 °F (37.1 °C)] 98.4 °F (36.9 °C)  Pulse:  [76-88] 88  Resp:  [17-26] 22  SpO2:  [91 %-96 %] 93 %  BP: (109-132)/(64-74) 114/71      Intake/Output Summary (Last 24 hours) at 3/28/2022 1018  Last data filed at 3/28/2022 0500  Gross per 24 hour   Intake 500 ml   Output --   Net 500 ml          Body mass index is 22.72 kg/m².    Physical Exam  Vitals and nursing note reviewed.   Constitutional:       General: He  is not in acute distress.     Appearance: Normal appearance. He is normal weight. He is not ill-appearing.   HENT:      Head: Normocephalic and atraumatic.      Right Ear: Hearing normal.      Left Ear: Hearing normal.      Nose: Nose normal.   Eyes:      Extraocular Movements: Extraocular movements intact.   Cardiovascular:      Rate and Rhythm: Normal rate.   Pulmonary:      Effort: Pulmonary effort is normal. No accessory muscle usage or respiratory distress.      Breath sounds: No wheezing (per RN exam).   Skin:     Findings: No rash.   Neurological:      General: No focal deficit present.      Mental Status: He is alert and oriented to person, place, and time.      Cranial Nerves: No cranial nerve deficit.      Motor: No weakness.   Psychiatric:         Attention and Perception: Attention normal.         Mood and Affect: Mood is depressed. Affect is blunt.         Speech: Speech is delayed.         Behavior: Behavior is cooperative.        Labs:  Recent Results (from the past 24 hour(s))   Renal function panel    Collection Time: 03/28/22  2:45 AM   Result Value Ref Range    Glucose 91 70 - 110 mg/dL    Sodium 137 136 - 145 mmol/L    Potassium 3.8 3.5 - 5.1 mmol/L    Chloride 97 95 - 110 mmol/L    CO2 30 (H) 23 - 29 mmol/L    BUN 16 6 - 20 mg/dL    Calcium 9.2 8.7 - 10.5 mg/dL    Creatinine 0.8 0.5 - 1.4 mg/dL    Albumin 2.6 (L) 3.5 - 5.2 g/dL    Phosphorus 3.4 2.7 - 4.5 mg/dL    eGFR if African American >60.0 >60 mL/min/1.73 m^2    eGFR if non African American >60.0 >60 mL/min/1.73 m^2    Anion Gap 10 8 - 16 mmol/L            Lab Results   Component Value Date    [id number] Negative 03/13/2022       Recent Labs   Lab 03/22/22  0300   WBC 7.85   LYMPH 30.3  2.4   HGB 13.3*   HCT 42.9          Recent Labs   Lab 03/22/22  0300 03/24/22  0236 03/26/22  0520 03/28/22  0245   * 137 135* 137   K 4.1 4.1 4.4 3.8   CL 97 98 97 97   CO2 30* 31* 33* 30*   BUN 18 18 12 16   CREATININE 0.9 0.9 0.8 0.8   GLU  88 91 80 91   CALCIUM 9.2 8.9 9.3 9.2   MG 2.0  --   --   --    PHOS 2.9 2.7 3.4 3.4       Recent Labs   Lab 03/22/22  0300 03/24/22  0236 03/26/22  0520 03/28/22  0245   ALKPHOS 65  --   --   --    ALT 18  --   --   --    AST 12  --   --   --    ALBUMIN 2.8* 2.6* 2.6* 2.6*   PROT 6.3  --   --   --    BILITOT 0.7  --   --   --           No results for input(s): DDIMER, FERRITIN, CRP, LDH, BNP, TROPONINI, CPK in the last 72 hours.    Invalid input(s): PROCALCITONIN    All labs within the last 24 hours were reviewed.     Microbiology:  Microbiology Results (last 7 days)       ** No results found for the last 168 hours. **              Imaging  ECG Results              EKG 12-lead (Final result)  Result time 03/14/22 16:40:40      Final result by Interface, Lab In [facility] (03/14/22 16:40:40)                   Narrative:    Test Reason : R06.02,    Vent. Rate : 102 BPM     Atrial Rate : 102 BPM     P-R Int : 160 ms          QRS Dur : 086 ms      QT Int : 354 ms       P-R-T Axes : 083 079 085 degrees     QTc Int : 461 ms    Sinus tachycardia  Right atrial enlargement  Borderline Abnormal ECG  When compared with ECG of 05-MAR-2022 15:30,  No significant change was found  Confirmed by Bryan DOBBINS MD ([id number]) on 3/14/2022 3:41:33 PM    Referred By: [email or web address]   SELF           Confirmed By:Bryan DOBBINS MD                                    Results for orders placed during the hospital encounter of 01/28/22    Echo    Interpretation Summary  · The left ventricle is normal in size with normal systolic function.  · Mild left atrial enlargement.  · There is pulmonary hypertension.  · The estimated ejection fraction is 55%.  · Grade I left ventricular diastolic dysfunction.  · Study quality was technically difficult  · Normal right ventricular size with normal right ventricular systolic function.  · Normal central venous pressure (3 mmHg).  · The estimated PA systolic pressure is 50 mmHg.  · Interatrial septal aneurysm  · Mild  tricuspid regurgitation.      X-Ray Chest AP Portable  Narrative: EXAMINATION:  XR CHEST AP PORTABLE    CLINICAL HISTORY:  Shortness of breath    TECHNIQUE:  Single frontal view of the chest was performed.    COMPARISON:  Chest radiograph 03/05/2022    FINDINGS:  Monitoring leads and tubing overlie the chest.    Cardiomediastinal silhouette is midline and stable.  Pulmonary vasculature and hilar contours are unchanged.  Few scattered linear opacities throughout the lungs consistent with minimal platelike scarring versus atelectasis.  Bilateral diffuse nonspecific interstitial coarsening with a perihilar and upper lobe predominance slightly more so on the left, with central peribronchial cuffing and overall increased from 03/05/2022 study.  The lungs are otherwise symmetrically hyperexpanded without large consolidation, pleural effusion or pneumothorax.  No acute osseous process seen.  Impression: Left greater than right perihilar and upper lung zone predominant interval increased nonspecific interstitial coarsening which can be seen with worsening interstitial type pneumonia from inflammatory or infectious process including atypical bacterial or viral etiology versus more atypical distribution of pulmonary edema.  No large consolidation.    Electronically signed by: Mckinley Talbert MD  Date:    03/13/2022  Time:    19:56      All imaging within the last 24 hours was reviewed.       Discharge Planning   RUTH: 3/29/2022     Code Status: Full Code   Is the patient medically ready for discharge?: Yes    Reason for patient still in hospital (select all that apply): Patient trending condition and Pending disposition  Discharge Plan A: Home   Discharge Delays: (!) Payor Issues        Assessment/Plan:      BiPAP (biphasic positive airway pressure) dependence  Awaiting insurance approval for BiPAP    Epigastric pain  With history or gastric ulcer  Continued PPI. Added TUMS and Carafate 3/20  Persisting - trial of GI cocktail x1  effective; need to reschedule appointment with GI for repeat EGD as soon as possible - EGD scheduled on 4/8 - patient needs pulmonary clearance prior to EGD and if cannot be scheduled in outpatient clinic, the consult team will assess    Chronic diastolic heart failure  Control BP; continuing home medications  Monitor    Chronic obstructive pulmonary disease  Patient having difficulty using his home inhalers - PharmD assistance appreciated.    Acute on chronic respiratory failure with hypoxia and hypercapnia  Uses home O2 at 2 - 3 LPM  Needs BiPAP at home for nightly use - awaiting Medicaid approval which may take 15-21 days and patient unwilling to wait until provided - will likely be discharged proior to machine arriving  Doing better on 1 LPM O2      Tobacco abuse  - Cessation counseling  - Nicotine patch     Essential hypertension  - Continue home Coreg and Lasix    COPD exacerbation  Wean O2 as tolerated to keep O2 saturations >88%  -  Repeat ECG without prolonged QTc- stopped Rocephin and azithro and transition to levaquin to complete course  - Continued prednisone to complete course  - Scheduled duonebs q 6 hrs      VTE Risk Mitigation (From admission, onward)         Ordered     enoxaparin injection 40 mg  Daily         03/13/22 2246     IP VTE HIGH RISK PATIENT  Once         03/13/22 2246     Place sequential compression device  Until discontinued         03/13/22 2246              I have assessed these findings virtually using a telemed platform and with assistance of the bedside nurse.      The attending portion of this evaluation, treatment, and documentation was performed per Michelle Espino MD via Telemedicine AudioVisual using the secure Vidyo software platform with 2 way audio/video. The provider was located off-site and the patient is located in the hospital. The aforementioned video software was utilized to document the relevant history and physical exam    Michelle Espino MD  Department of  [facility] Medicine   [facility] S[facility]

## 2022-03-29 NOTE — ASSESSMENT & PLAN NOTE
Uses home O2 at 2 - 3 LPM  Needs BiPAP at home for nightly use - awaiting Medicaid approval which may take 15-21 days and patient unwilling to wait until provided - will likely be discharged proior to machine arriving  Doing better on 1 LPM O2

## 2022-03-29 NOTE — PLAN OF CARE
Call placed to patient's sister Viry ([phone]). CM informed Viry that [facility] is still waiting on Medicaid to give insurance approval for her brother's Bipap. She expressed understanding and requested that MD please call her to discuss her brother's discharge.    Galina Beasley RN  Ext [phone]
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
## 2022-03-30 PROBLEM — Z75.8 DISCHARGE PLANNING ISSUES: Status: ACTIVE | Noted: 2022-03-30

## 2022-03-30 LAB
ALBUMIN SERPL BCP-MCNC: 2.7 G/DL (ref 3.5–5.2)
ANION GAP SERPL CALC-SCNC: 10 MMOL/L (ref 8–16)
BUN SERPL-MCNC: 13 MG/DL (ref 6–20)
CALCIUM SERPL-MCNC: 9.4 MG/DL (ref 8.7–10.5)
CHLORIDE SERPL-SCNC: 98 MMOL/L (ref 95–110)
CO2 SERPL-SCNC: 31 MMOL/L (ref 23–29)
CREAT SERPL-MCNC: 0.9 MG/DL (ref 0.5–1.4)
EST. GFR  (AFRICAN AMERICAN): >60 ML/MIN/1.73 M^2
EST. GFR  (NON AFRICAN AMERICAN): >60 ML/MIN/1.73 M^2
GLUCOSE SERPL-MCNC: 80 MG/DL (ref 70–110)
PHOSPHATE SERPL-MCNC: 3.2 MG/DL (ref 2.7–4.5)
POTASSIUM SERPL-SCNC: 3.9 MMOL/L (ref 3.5–5.1)
SODIUM SERPL-SCNC: 139 MMOL/L (ref 136–145)

## 2022-03-30 PROCEDURE — 25000003 PHARM REV CODE 250: Performed by: INTERNAL MEDICINE

## 2022-03-30 PROCEDURE — 25000003 PHARM REV CODE 250: Performed by: NURSE PRACTITIONER

## 2022-03-30 PROCEDURE — 94660 CPAP INITIATION&MGMT: CPT

## 2022-03-30 PROCEDURE — 99232 PR SUBSEQUENT HOSPITAL CARE,LEVL II: ICD-10-PCS | Mod: 95,,, | Performed by: INTERNAL MEDICINE

## 2022-03-30 PROCEDURE — S4991 NICOTINE PATCH NONLEGEND: HCPCS | Performed by: INTERNAL MEDICINE

## 2022-03-30 PROCEDURE — 36415 COLL VENOUS BLD VENIPUNCTURE: CPT | Performed by: INTERNAL MEDICINE

## 2022-03-30 PROCEDURE — 99900035 HC TECH TIME PER 15 MIN (STAT)

## 2022-03-30 PROCEDURE — 63600175 PHARM REV CODE 636 W HCPCS: Performed by: PHYSICIAN ASSISTANT

## 2022-03-30 PROCEDURE — 99232 SBSQ HOSP IP/OBS MODERATE 35: CPT | Mod: 95,,, | Performed by: INTERNAL MEDICINE

## 2022-03-30 PROCEDURE — 11000001 HC ACUTE MED/SURG PRIVATE ROOM

## 2022-03-30 PROCEDURE — 80069 RENAL FUNCTION PANEL: CPT | Performed by: INTERNAL MEDICINE

## 2022-03-30 PROCEDURE — 94761 N-INVAS EAR/PLS OXIMETRY MLT: CPT

## 2022-03-30 PROCEDURE — 94640 AIRWAY INHALATION TREATMENT: CPT

## 2022-03-30 PROCEDURE — 25000242 PHARM REV CODE 250 ALT 637 W/ HCPCS: Performed by: PHYSICIAN ASSISTANT

## 2022-03-30 PROCEDURE — 25000003 PHARM REV CODE 250: Performed by: PHYSICIAN ASSISTANT

## 2022-03-30 PROCEDURE — 27000221 HC OXYGEN, UP TO 24 HOURS

## 2022-03-30 RX ADMIN — ACETAMINOPHEN 650 MG: 325 TABLET ORAL at 04:03

## 2022-03-30 RX ADMIN — CALCIUM CARBONATE (ANTACID) CHEW TAB 500 MG 1000 MG: 500 CHEW TAB at 09:03

## 2022-03-30 RX ADMIN — CARVEDILOL 12.5 MG: 12.5 TABLET, FILM COATED ORAL at 08:03

## 2022-03-30 RX ADMIN — FUROSEMIDE 20 MG: 20 TABLET ORAL at 08:03

## 2022-03-30 RX ADMIN — IPRATROPIUM BROMIDE AND ALBUTEROL SULFATE 3 ML: 2.5; .5 SOLUTION RESPIRATORY (INHALATION) at 07:03

## 2022-03-30 RX ADMIN — SUCRALFATE 1 G: 1 TABLET ORAL at 04:03

## 2022-03-30 RX ADMIN — NICOTINE 1 PATCH: 14 PATCH, EXTENDED RELEASE TRANSDERMAL at 08:03

## 2022-03-30 RX ADMIN — SUCRALFATE 1 G: 1 TABLET ORAL at 06:03

## 2022-03-30 RX ADMIN — PANTOPRAZOLE SODIUM 40 MG: 40 TABLET, DELAYED RELEASE ORAL at 08:03

## 2022-03-30 RX ADMIN — IPRATROPIUM BROMIDE AND ALBUTEROL SULFATE 3 ML: 2.5; .5 SOLUTION RESPIRATORY (INHALATION) at 01:03

## 2022-03-30 RX ADMIN — IPRATROPIUM BROMIDE AND ALBUTEROL SULFATE 3 ML: 2.5; .5 SOLUTION RESPIRATORY (INHALATION) at 12:03

## 2022-03-30 RX ADMIN — SUCRALFATE 1 G: 1 TABLET ORAL at 11:03

## 2022-03-30 RX ADMIN — CARVEDILOL 12.5 MG: 12.5 TABLET, FILM COATED ORAL at 04:03

## 2022-03-30 RX ADMIN — SUCRALFATE 1 G: 1 TABLET ORAL at 08:03
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
## 2022-03-30 NOTE — ASSESSMENT & PLAN NOTE
Uses home O2 at 2 - 3 LPM  Needs BiPAP at home for nightly use - awaiting Medicaid approval which may take 15-21 days and patient  Currently willing to wait until provided - will likely be discharged proior to machine arriving  Doing better on 1 LPM O2

## 2022-03-30 NOTE — PLAN OF CARE
Problem: Adult Inpatient Plan of Care  Goal: Plan of Care Review  Outcome: Ongoing, Progressing     Problem: Adult Inpatient Plan of Care  Goal: Patient-Specific Goal (Individualized)  Outcome: Ongoing, Progressing     Problem: Adult Inpatient Plan of Care  Goal: Absence of Hospital-Acquired Illness or Injury  Outcome: Ongoing, Progressing     Problem: Adult Inpatient Plan of Care  Goal: Optimal Comfort and Wellbeing  Outcome: Ongoing, Progressing     Problem: Fall Injury Risk  Goal: Absence of Fall and Fall-Related Injury  Outcome: Ongoing, Progressing   Pt is AAO X4, he is up in the room ambulatory ad karen. His appetite is good to the BR as needed, he denies pain when offered, Plan of care reviewed with pt, questions answered he states his understanding.

## 2022-03-30 NOTE — ASSESSMENT & PLAN NOTE
With history or gastric ulcer  Continued PPI. Added TUMS and Carafate 3/20  Persisting - trial of GI cocktail x1 effective  - EGD scheduled on 4/8 - patient had Pulmonary clearance done on 3/25/2022
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
## 2022-03-31 PROCEDURE — 25000003 PHARM REV CODE 250: Performed by: NURSE PRACTITIONER

## 2022-03-31 PROCEDURE — 99900035 HC TECH TIME PER 15 MIN (STAT)

## 2022-03-31 PROCEDURE — 25000242 PHARM REV CODE 250 ALT 637 W/ HCPCS: Performed by: PHYSICIAN ASSISTANT

## 2022-03-31 PROCEDURE — 25000003 PHARM REV CODE 250: Performed by: INTERNAL MEDICINE

## 2022-03-31 PROCEDURE — 25000003 PHARM REV CODE 250: Performed by: PHYSICIAN ASSISTANT

## 2022-03-31 PROCEDURE — 99232 PR SUBSEQUENT HOSPITAL CARE,LEVL II: ICD-10-PCS | Mod: 95,,, | Performed by: INTERNAL MEDICINE

## 2022-03-31 PROCEDURE — 27000221 HC OXYGEN, UP TO 24 HOURS

## 2022-03-31 PROCEDURE — 11000001 HC ACUTE MED/SURG PRIVATE ROOM

## 2022-03-31 PROCEDURE — 94640 AIRWAY INHALATION TREATMENT: CPT

## 2022-03-31 PROCEDURE — S4991 NICOTINE PATCH NONLEGEND: HCPCS | Performed by: INTERNAL MEDICINE

## 2022-03-31 PROCEDURE — 94761 N-INVAS EAR/PLS OXIMETRY MLT: CPT

## 2022-03-31 PROCEDURE — 94660 CPAP INITIATION&MGMT: CPT

## 2022-03-31 PROCEDURE — 99232 SBSQ HOSP IP/OBS MODERATE 35: CPT | Mod: 95,,, | Performed by: INTERNAL MEDICINE

## 2022-03-31 PROCEDURE — 99900031 HC PATIENT EDUCATION (STAT)

## 2022-03-31 RX ADMIN — FUROSEMIDE 20 MG: 20 TABLET ORAL at 08:03

## 2022-03-31 RX ADMIN — CARVEDILOL 12.5 MG: 12.5 TABLET, FILM COATED ORAL at 08:03

## 2022-03-31 RX ADMIN — PANTOPRAZOLE SODIUM 40 MG: 40 TABLET, DELAYED RELEASE ORAL at 08:03

## 2022-03-31 RX ADMIN — SUCRALFATE 1 G: 1 TABLET ORAL at 08:03

## 2022-03-31 RX ADMIN — IPRATROPIUM BROMIDE AND ALBUTEROL SULFATE 3 ML: 2.5; .5 SOLUTION RESPIRATORY (INHALATION) at 09:03

## 2022-03-31 RX ADMIN — NICOTINE 1 PATCH: 14 PATCH, EXTENDED RELEASE TRANSDERMAL at 08:03

## 2022-03-31 RX ADMIN — IPRATROPIUM BROMIDE AND ALBUTEROL SULFATE 3 ML: 2.5; .5 SOLUTION RESPIRATORY (INHALATION) at 01:03

## 2022-03-31 RX ADMIN — CARVEDILOL 12.5 MG: 12.5 TABLET, FILM COATED ORAL at 04:03

## 2022-03-31 RX ADMIN — SUCRALFATE 1 G: 1 TABLET ORAL at 04:03

## 2022-03-31 RX ADMIN — IPRATROPIUM BROMIDE AND ALBUTEROL SULFATE 3 ML: 2.5; .5 SOLUTION RESPIRATORY (INHALATION) at 12:03

## 2022-03-31 RX ADMIN — ACETAMINOPHEN 650 MG: 325 TABLET ORAL at 04:03

## 2022-03-31 RX ADMIN — IPRATROPIUM BROMIDE AND ALBUTEROL SULFATE 3 ML: 2.5; .5 SOLUTION RESPIRATORY (INHALATION) at 07:03

## 2022-03-31 RX ADMIN — SUCRALFATE 1 G: 1 TABLET ORAL at 06:03

## 2022-03-31 RX ADMIN — ACETAMINOPHEN 650 MG: 325 TABLET ORAL at 03:03

## 2022-03-31 RX ADMIN — SUCRALFATE 1 G: 1 TABLET ORAL at 11:03
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
## 2022-03-31 NOTE — SUBJECTIVE & OBJECTIVE
Telemedicine  This service was provided by Virtual Visit.    Patient was transferred to [facility] Medicine on:  3/17/2022    Chief Complaint   Patient presents with    Shortness of Breath     Pt brought to ED via NOHD from home on CPAP for SOB that started a few hours ago.      The patient location is: 4/4 A   Admitted 3/13/2022  7:17 PM  Present with the patient at the time of the telemed/virtual assessment: Telepresenter    Interval History / Events Overnight:   The patient is able to provide adequate history. Additional history was obtained from past medical records. No significant events reported by Nursing. Patient remains BiPAP-dependent  Patient complains of nothing specific. Symptoms have been unchanged since yesterday. Associated symptoms include: fatigue. Symptoms are stable.     Lab test(s) reviewed: H&H stable    Review of Systems   Constitutional:  Negative for fever.   Respiratory:  Negative for chest tightness.      Objective:     Vital Signs (Most Recent):  Temp: 97.9 °F (36.6 °C) (03/31/22 1131)  Pulse: 78 (03/31/22 1300)  Resp: 18 (03/31/22 1300)  BP: (!) 142/76 (03/31/22 1131)  SpO2: 95 % (03/31/22 1300)   Vital Signs (24h Range):  Temp:  [96 °F (35.6 °C)-98.1 °F (36.7 °C)] 97.9 °F (36.6 °C)  Pulse:  [71-87] 78  Resp:  [18-27] 18  SpO2:  [90 %-98 %] 95 %  BP: (120-142)/(67-76) 142/76     Weight: 65.8 kg (145 lb 1 oz)  Body mass index is 22.72 kg/m².    Intake/Output Summary (Last 24 hours) at 3/31/2022 1337  Last data filed at 3/31/2022 0500  Gross per 24 hour   Intake --   Output 500 ml   Net -500 ml        Physical Exam  Constitutional:       General: He is not in acute distress.     Appearance: Normal appearance.   Eyes:      General: Lids are normal. No scleral icterus.        Right eye: No discharge.         Left eye: No discharge.      Conjunctiva/sclera: Conjunctivae normal.   Neck:      Trachea: Phonation normal.   Cardiovascular:      Rate and Rhythm: Normal rate.       Comments: Monitor / Vital signs reviewed at time of visit  Pulmonary:      Effort: Pulmonary effort is normal. No tachypnea, accessory muscle usage or respiratory distress.   Abdominal:      General: There is no distension.   Neurological:      Mental Status: He is alert. He is not disoriented.   Psychiatric:         Attention and Perception: Attention normal.         Mood and Affect: Affect normal.         Behavior: Behavior is cooperative.       Significant Labs:    Recent Labs   Lab 02/10/22  0345   HGBA1C 5.7*       No results for input(s): WBC, HGB, HCT, PLT in the last 168 hours.    No results for input(s): GRAN, SEGS, BANDS, BAND, LYMPH, LYMPHS, MONO, MONOCYTES, EOS, EOSINOPHILS in the last 168 hours.    Recent Labs   Lab 03/26/22  0520 03/28/22  0245 03/30/22  0420   * 137 139   K 4.4 3.8 3.9   CL 97 97 98   CO2 33* 30* 31*   BUN 12 16 13   CREATININE 0.8 0.8 0.9   GLU 80 91 80   CALCIUM 9.3 9.2 9.4   ALBUMIN 2.6* 2.6* 2.7*   PHOS 3.4 3.4 3.2       No results for input(s): ALKPHOS, ALT, AST, PROT, BILITOT, INR, LIPASE, LDH, CRP in the last 168 hours.    Recent Labs   Lab 12/21/21  1849 01/28/22  0514 03/06/22  0348   PROCAL  --   --  0.10   DDIMER 0.91* 0.67*  --        SARS-CoV2 (COVID-19) Qualitative PCR (no units)   Date Value   08/19/2021 Not Detected   08/05/2021 Detected (A)   07/29/2021 Not Detected   07/22/2021 Not Detected   06/24/2021 Not Detected   06/17/2021 Not Detected   04/24/2021 Not Detected   04/23/2020 Not Detected   04/21/2020 Not Detected     SARS-CoV-2 RNA, Amplification, Qual (no units)   Date Value   06/10/2021 Negative   07/20/2020 Negative   06/25/2020 Negative   06/04/2020 Negative   05/23/2020 Negative   05/12/2020 Negative   05/06/2020 Negative   04/19/2020 Negative   04/03/2020 Negative     POC Rapid COVID (no units)   Date Value   03/13/2022 Negative   03/05/2022 Negative   02/09/2022 Negative   02/07/2022 Negative   01/28/2022 Negative   01/15/2022 Negative   01/01/2022  Negative   12/21/2021 Negative   06/07/2021 Negative   06/03/2021 Negative       ECG Results              EKG 12-lead (Final result)  Result time 03/14/22 16:40:40      Final result by Interface, Lab In [facility] (03/14/22 16:40:40)                   Narrative:    Test Reason : R06.02,    Vent. Rate : 102 BPM     Atrial Rate : 102 BPM     P-R Int : 160 ms          QRS Dur : 086 ms      QT Int : 354 ms       P-R-T Axes : 083 079 085 degrees     QTc Int : 461 ms    Sinus tachycardia  Right atrial enlargement  Borderline Abnormal ECG  When compared with ECG of 05-MAR-2022 15:30,  No significant change was found  Confirmed by Bryan DOBBINS MD ([id number]) on 3/14/2022 3:41:33 PM    Referred By: [email or web address]   SELF           Confirmed By:Bryan DOBBINS MD                                    Results for orders placed during the hospital encounter of 01/28/22    Echo    Interpretation Summary  · The left ventricle is normal in size with normal systolic function.  · Mild left atrial enlargement.  · There is pulmonary hypertension.  · The estimated ejection fraction is 55%.  · Grade I left ventricular diastolic dysfunction.  · Study quality was technically difficult  · Normal right ventricular size with normal right ventricular systolic function.  · Normal central venous pressure (3 mmHg).  · The estimated PA systolic pressure is 50 mmHg.  · Interatrial septal aneurysm  · Mild tricuspid regurgitation.      X-Ray Chest AP Portable  Narrative: EXAMINATION:  XR CHEST AP PORTABLE    CLINICAL HISTORY:  Shortness of breath    TECHNIQUE:  Single frontal view of the chest was performed.    COMPARISON:  Chest radiograph 03/05/2022    FINDINGS:  Monitoring leads and tubing overlie the chest.    Cardiomediastinal silhouette is midline and stable.  Pulmonary vasculature and hilar contours are unchanged.  Few scattered linear opacities throughout the lungs consistent with minimal platelike scarring versus atelectasis.  Bilateral diffuse nonspecific  interstitial coarsening with a perihilar and upper lobe predominance slightly more so on the left, with central peribronchial cuffing and overall increased from 03/05/2022 study.  The lungs are otherwise symmetrically hyperexpanded without large consolidation, pleural effusion or pneumothorax.  No acute osseous process seen.  Impression: Left greater than right perihilar and upper lung zone predominant interval increased nonspecific interstitial coarsening which can be seen with worsening interstitial type pneumonia from inflammatory or infectious process including atypical bacterial or viral etiology versus more atypical distribution of pulmonary edema.  No large consolidation.    Electronically signed by: Mckinley Talbert MD  Date:    03/13/2022  Time:    19:56      Labs and Imaging within the last 24 hours listed above were reviewed.       Diet: Diet Cardiac [facility]  Diet Cardiac  Significant LDAs:   IV Access Type: Peripheral  Urinary Catheter Indication if present: Patient Does Not Have Urinary Catheter  Other Lines/Tubes/Drains:    HIGH RISK CONDITION(S):   Patient has a condition that poses threat to life and bodily function: Severe Respiratory Distress     Goals of Care:    Previous admission:  3/5/22  Likely prognosis:  Fair  Code Status: Full Code  Comfort Only: No  Hospice: No  Goals at discharge: remain at home, with physician follow-up    Discharge Planning   RUTH: 4/1/2022     Code Status: Full Code   Is the patient medically ready for discharge?: Yes    Reason for patient still in hospital (select all that apply): Patient trending condition and Other (specify) awaiting BiPAP  Discharge Plan A: Home   Discharge Delays: (!) Payor Issues

## 2022-03-31 NOTE — ASSESSMENT & PLAN NOTE
Uses home O2 at 2 - 3 LPM  Needs BiPAP at home for nightly use - awaiting Medicaid approval   Doing better on 1 LPM O2; remains BiPAP-dependent
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
## 2022-04-01 VITALS
HEIGHT: 67 IN | BODY MASS INDEX: 22.77 KG/M2 | HEART RATE: 78 BPM | OXYGEN SATURATION: 98 % | TEMPERATURE: 98 F | WEIGHT: 145.06 LBS | RESPIRATION RATE: 16 BRPM | DIASTOLIC BLOOD PRESSURE: 80 MMHG | SYSTOLIC BLOOD PRESSURE: 141 MMHG

## 2022-04-01 LAB
ALBUMIN SERPL BCP-MCNC: 2.7 G/DL (ref 3.5–5.2)
ALBUMIN SERPL BCP-MCNC: 2.7 G/DL (ref 3.5–5.2)
ALP SERPL-CCNC: 70 U/L (ref 55–135)
ALT SERPL W/O P-5'-P-CCNC: 8 U/L (ref 10–44)
ANION GAP SERPL CALC-SCNC: 10 MMOL/L (ref 8–16)
ANION GAP SERPL CALC-SCNC: 10 MMOL/L (ref 8–16)
AST SERPL-CCNC: 13 U/L (ref 10–40)
BASOPHILS # BLD AUTO: 0.07 K/UL (ref 0–0.2)
BASOPHILS NFR BLD: 1 % (ref 0–1.9)
BILIRUB SERPL-MCNC: 0.3 MG/DL (ref 0.1–1)
BUN SERPL-MCNC: 14 MG/DL (ref 6–20)
BUN SERPL-MCNC: 14 MG/DL (ref 6–20)
CALCIUM SERPL-MCNC: 9.1 MG/DL (ref 8.7–10.5)
CALCIUM SERPL-MCNC: 9.1 MG/DL (ref 8.7–10.5)
CHLORIDE SERPL-SCNC: 98 MMOL/L (ref 95–110)
CHLORIDE SERPL-SCNC: 98 MMOL/L (ref 95–110)
CO2 SERPL-SCNC: 29 MMOL/L (ref 23–29)
CO2 SERPL-SCNC: 29 MMOL/L (ref 23–29)
CREAT SERPL-MCNC: 0.8 MG/DL (ref 0.5–1.4)
CREAT SERPL-MCNC: 0.8 MG/DL (ref 0.5–1.4)
DIFFERENTIAL METHOD: ABNORMAL
EOSINOPHIL # BLD AUTO: 0.5 K/UL (ref 0–0.5)
EOSINOPHIL NFR BLD: 6.9 % (ref 0–8)
ERYTHROCYTE [DISTWIDTH] IN BLOOD BY AUTOMATED COUNT: 16.6 % (ref 11.5–14.5)
EST. GFR  (AFRICAN AMERICAN): >60 ML/MIN/1.73 M^2
EST. GFR  (AFRICAN AMERICAN): >60 ML/MIN/1.73 M^2
EST. GFR  (NON AFRICAN AMERICAN): >60 ML/MIN/1.73 M^2
EST. GFR  (NON AFRICAN AMERICAN): >60 ML/MIN/1.73 M^2
GLUCOSE SERPL-MCNC: 80 MG/DL (ref 70–110)
GLUCOSE SERPL-MCNC: 80 MG/DL (ref 70–110)
HCT VFR BLD AUTO: 36.9 % (ref 40–54)
HGB BLD-MCNC: 11.3 G/DL (ref 14–18)
IMM GRANULOCYTES # BLD AUTO: 0.02 K/UL (ref 0–0.04)
IMM GRANULOCYTES NFR BLD AUTO: 0.3 % (ref 0–0.5)
LYMPHOCYTES # BLD AUTO: 2.3 K/UL (ref 1–4.8)
LYMPHOCYTES NFR BLD: 34.4 % (ref 18–48)
MAGNESIUM SERPL-MCNC: 1.7 MG/DL (ref 1.6–2.6)
MCH RBC QN AUTO: 25.8 PG (ref 27–31)
MCHC RBC AUTO-ENTMCNC: 30.6 G/DL (ref 32–36)
MCV RBC AUTO: 84 FL (ref 82–98)
MONOCYTES # BLD AUTO: 0.7 K/UL (ref 0.3–1)
MONOCYTES NFR BLD: 9.7 % (ref 4–15)
NEUTROPHILS # BLD AUTO: 3.2 K/UL (ref 1.8–7.7)
NEUTROPHILS NFR BLD: 47.7 % (ref 38–73)
NRBC BLD-RTO: 0 /100 WBC
PHOSPHATE SERPL-MCNC: 3.4 MG/DL (ref 2.7–4.5)
PHOSPHATE SERPL-MCNC: 3.4 MG/DL (ref 2.7–4.5)
PLATELET # BLD AUTO: 324 K/UL (ref 150–450)
PMV BLD AUTO: 10.6 FL (ref 9.2–12.9)
POTASSIUM SERPL-SCNC: 4.1 MMOL/L (ref 3.5–5.1)
POTASSIUM SERPL-SCNC: 4.1 MMOL/L (ref 3.5–5.1)
PROT SERPL-MCNC: 6.6 G/DL (ref 6–8.4)
RBC # BLD AUTO: 4.38 M/UL (ref 4.6–6.2)
SODIUM SERPL-SCNC: 137 MMOL/L (ref 136–145)
SODIUM SERPL-SCNC: 137 MMOL/L (ref 136–145)
WBC # BLD AUTO: 6.78 K/UL (ref 3.9–12.7)

## 2022-04-01 PROCEDURE — S4991 NICOTINE PATCH NONLEGEND: HCPCS | Performed by: INTERNAL MEDICINE

## 2022-04-01 PROCEDURE — 80053 COMPREHEN METABOLIC PANEL: CPT | Performed by: INTERNAL MEDICINE

## 2022-04-01 PROCEDURE — 99900035 HC TECH TIME PER 15 MIN (STAT)

## 2022-04-01 PROCEDURE — 83735 ASSAY OF MAGNESIUM: CPT | Performed by: INTERNAL MEDICINE

## 2022-04-01 PROCEDURE — 25000003 PHARM REV CODE 250: Performed by: INTERNAL MEDICINE

## 2022-04-01 PROCEDURE — 94761 N-INVAS EAR/PLS OXIMETRY MLT: CPT

## 2022-04-01 PROCEDURE — 25000003 PHARM REV CODE 250: Performed by: PHYSICIAN ASSISTANT

## 2022-04-01 PROCEDURE — 25000242 PHARM REV CODE 250 ALT 637 W/ HCPCS: Performed by: PHYSICIAN ASSISTANT

## 2022-04-01 PROCEDURE — 84100 ASSAY OF PHOSPHORUS: CPT | Performed by: INTERNAL MEDICINE

## 2022-04-01 PROCEDURE — 36415 COLL VENOUS BLD VENIPUNCTURE: CPT | Performed by: INTERNAL MEDICINE

## 2022-04-01 PROCEDURE — 94640 AIRWAY INHALATION TREATMENT: CPT

## 2022-04-01 PROCEDURE — 85025 COMPLETE CBC W/AUTO DIFF WBC: CPT | Performed by: INTERNAL MEDICINE

## 2022-04-01 PROCEDURE — 99239 PR HOSPITAL DISCHARGE DAY,>30 MIN: ICD-10-PCS | Mod: 95,,, | Performed by: INTERNAL MEDICINE

## 2022-04-01 PROCEDURE — 99239 HOSP IP/OBS DSCHRG MGMT >30: CPT | Mod: 95,,, | Performed by: INTERNAL MEDICINE

## 2022-04-01 PROCEDURE — 27000221 HC OXYGEN, UP TO 24 HOURS

## 2022-04-01 RX ADMIN — FUROSEMIDE 20 MG: 20 TABLET ORAL at 10:04

## 2022-04-01 RX ADMIN — PANTOPRAZOLE SODIUM 40 MG: 40 TABLET, DELAYED RELEASE ORAL at 10:04

## 2022-04-01 RX ADMIN — NICOTINE 1 PATCH: 14 PATCH, EXTENDED RELEASE TRANSDERMAL at 10:04

## 2022-04-01 RX ADMIN — IPRATROPIUM BROMIDE AND ALBUTEROL SULFATE 3 ML: 2.5; .5 SOLUTION RESPIRATORY (INHALATION) at 09:04

## 2022-04-01 RX ADMIN — IPRATROPIUM BROMIDE AND ALBUTEROL SULFATE 3 ML: 2.5; .5 SOLUTION RESPIRATORY (INHALATION) at 01:04

## 2022-04-01 RX ADMIN — SUCRALFATE 1 G: 1 TABLET ORAL at 12:04

## 2022-04-01 RX ADMIN — SUCRALFATE 1 G: 1 TABLET ORAL at 06:04

## 2022-04-01 RX ADMIN — CARVEDILOL 12.5 MG: 12.5 TABLET, FILM COATED ORAL at 10:04

## 2022-04-01 NOTE — PLAN OF CARE
[facility] - Med Surg  Discharge Final Note    Primary Care Provider: [facility]    Expected Discharge Date: 4/1/2022       Future Appointments   Date Time Provider Department Center   4/22/2022  4:00 PM Jesusita Singletary MD [facility]         Final Discharge Note (most recent)     Final Note - 04/01/22 1309        Final Note    Assessment Type Final Discharge Note     Anticipated Discharge Disposition Home or Self Care     What phone number can be called within the next 1-3 days to see how you are doing after discharge? [phone]     Hospital Resources/Appts/Education Provided Appointments scheduled and added to AVS   Ambulatory referrals sent to [facility], [facility], and [facility].       Post-Acute Status    Discharge Delays None known at this time                 Important Message from Medicare             Contact Info     [facility]   Relationship: PCP - General    [address]  [facility] [address]   Phone: [phone]       Next Steps: Go on 4/8/2022    Instructions: Appointment time: 12:00PM with Viktoriya Mathews NP  Please bring your discharge paperwork to appointment.    [facility] - Pulmonary Svcs 9th Fl   Specialty: Pulmonology    [address]  [facility] [address]   Phone: [phone]       Next Steps: Follow up on 4/22/2022    Instructions: Friday 4/22 @ 4pm with Dr Singletary    [facility]    [address]  [facility] [address]   Phone: [phone]       Next Steps: Follow up            Galina Beasley RN  Ext [phone]
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
## 2022-04-01 NOTE — PLAN OF CARE
Call placed to patient's sister Viry to update her on her brother's discharge plan. Patient is expected to discharge home once his Bipap has been delivered to his room and set-up is completed. CM explained that patient has been referred to [facility] and [facility] to provide assistance after discharge. Viry expressed understanding and informed this CM that she and her sister Gladis would be going to Mr Mccray's home this evening.    Galina Beasley RN  Ext [phone]

## 2022-04-01 NOTE — PLAN OF CARE
CM Supervisor placed order for [facility] ambulatory referral placed.    CARLOS Pena  Case   Ext [phone]

## 2022-04-01 NOTE — PLAN OF CARE
[facility] - Med Surg      HOME HEALTH ORDERS  FACE TO FACE ENCOUNTER    Patient Name: Baltazar Mccray  YOB: 1960    PCP: [facility]   PCP Address: [address] / [facility] [place] [address]  PCP Phone Number: [phone]  PCP Fax: [phone]    Encounter Date: 3/13/22    Admit to Home Health    Diagnoses:  Active Hospital Problems    Diagnosis  POA    *Acute on chronic respiratory failure with hypoxia and hypercapnia [J96.21, J96.22]  Yes    Discharge planning issues [Z02.9]  Not Applicable    Pre-operative respiratory examination [Z01.811]  Not Applicable    BiPAP (biphasic positive airway pressure) dependence [Z99.89]  Not Applicable    Epigastric pain [R10.13]  Yes    Chronic diastolic heart failure [I50.32]  Yes    Chronic obstructive pulmonary disease [J44.9]  Yes     Chronic     Per my interpretation spirometry shows significant obstruction-FEV1 28% of predicted, moderate restriction and significantly decreased DLCO.     MMRC 3-4 symptoms of dyspnea-has home oxygen  Greater than 10 COPD exacerbations within 1 year;  Had recent exacerbation few weeks ago.  Prescribe prednisone and antibiotics seems to be better.  Concerns for noncompliance with inhaler regimen  Approximately to hospitalizations for COPD within the last 1 year  Notes to have a chronic cough  Significant smoking history  History of crack cocaine use quit 20 years  History of working at  [employer] -in the 90s exposed to black sand  Now reporting better living arrangements at Nursing home.       Has failed previous inhaler therapy (advair and Spiriva)   Also, discussed pulmonary rehabilitation   May be considered for advanced lung follow-up- he patient is 61 years old.    Explains he needs to remain smoke free for approximately 6 months    -Encouraged complete smoking cessation. I have referred you to the smoking cessation program.   -Start Breztri. Rinse mouth after use.   -Referal to pulm rehab placed-  patient interested.   -Continue albuterol 2 puffs every 4-6 hours as needed for shortness of breath or wheezing.   - Please obtain CT of chest in relation to previous abnormal CT - for follow up. Would recommend annual low dose CT screening in relation to your smoking history.   -Please make sure pneumonia vaccine is up to date. Thinks could have obtained the pneumonia vaccine. Would like to follow up with [facility] and [facility].   -Encourage COVID vaccine.   -Continue home oxygen as prescribed.   -Follow up in 8 weeks or sooner if needed.       Tobacco abuse [Z72.0]  Yes     Chronic    Essential hypertension [I10]  Yes     Chronic    COPD exacerbation [J44.1]  Yes      Resolved Hospital Problems   No resolved problems to display.       Follow Up Appointments:  Future Appointments   Date Time Provider Department Center   4/22/2022  4:00 PM Jesusita Singletary MD [facility]       Allergies:  Review of patient's allergies indicates:   Allergen Reactions    Benazepril Swelling    Duoneb [ipratropium-albuterol]      Report minor Tremors, pt seems to be tolerating well.       Medications: Review discharge medications with patient and family and provide education.    Current Facility-Administered Medications   Medication Dose Route Frequency Provider Last Rate Last Admin    acetaminophen tablet 650 mg  650 mg Oral Q6H PRN Jesusita Churchill NP   650 mg at 03/31/22 1509    albuterol-ipratropium 2.5 mg-0.5 mg/3 mL nebulizer solution 3 mL  3 mL Nebulization Q6H Maria Del Carmen Bell PA-C   3 mL at 03/31/22 1934    calcium carbonate 200 mg calcium (500 mg) chewable tablet 1,000 mg  1,000 mg Oral TID PRN Janice Schultz MD   1,000 mg at 03/30/22 0911    carvediloL tablet 12.5 mg  12.5 mg Oral BID WM Maria Del Carmen Bell PA-C   12.5 mg at 03/31/22 1646    cloNIDine tablet 0.1 mg  0.1 mg Oral TID PRN Marcell Celestin MD   0.1 mg at 03/15/22 1239    enoxaparin injection 40 mg  40 mg Subcutaneous  Daily Maria Del Carmen Bell PA-C   40 mg at 03/21/22 1623    furosemide tablet 20 mg  20 mg Oral Daily Marcell Celestin MD   20 mg at 03/31/22 0804    hydrALAZINE injection 10 mg  10 mg Intravenous Q6H PRN Rick Clark NP   10 mg at 03/14/22 1832    influenza (QUADRIVALENT PF) vaccine 0.5 mL  0.5 mL Intramuscular vaccine x 1 dose Janice Schultz MD        magnesium hydroxide 400 mg/5 ml suspension 2,400 mg  30 mL Oral Daily PRN Janice Schultz MD   2,400 mg at 03/23/22 1623    nicotine 14 mg/24 hr 1 patch  1 patch Transdermal Daily Janice Schultz MD   1 patch at 03/31/22 0804    pantoprazole EC tablet 40 mg  40 mg Oral BID Maria Del Carmen Bell PA-C   40 mg at 03/31/22 2046    simethicone chewable tablet 80 mg  1 tablet Oral TID PRN Rosio Diggs DNP   80 mg at 03/20/22 1259    sodium chloride 0.9% flush 10 mL  10 mL Intravenous PRN Maria Del Carmen Bell PA-C        sucralfate tablet 1 g  1 g Oral QID (AC & HS) Janice Schultz MD   1 g at 03/31/22 2046     Current Discharge Medication List      CONTINUE these medications which have CHANGED    Details   albuterol (PROVENTIL/VENTOLIN HFA) 90 mcg/actuation inhaler Inhale 1-2 puffs into the lungs every 6 (six) hours as needed for Wheezing. Rescue  Qty: 8.5 g, Refills: 0      budesonide-glycopyr-formoterol (BREZTRI AEROSPHERE) 160-9-4.8 mcg/actuation HFAA Inhale 2 puffs into the lungs 2 (two) times daily. Rinse mouth after use.  Qty: 10.7 g, Refills: 11    Comments: Patient has failed prior inhaler therapy  BEDSIDE DELIVERY  Associated Diagnoses: Chronic obstructive pulmonary disease, unspecified COPD type         CONTINUE these medications which have NOT CHANGED    Details   acetaminophen (TYLENOL) 325 MG tablet Take 2 tablets (650 mg total) by mouth every 8 (eight) hours as needed for Pain or Temperature greater than (100.5).  Refills: 0    Associated Diagnoses: Acute on chronic respiratory failure with hypoxia and hypercapnia       albuterol-ipratropium (DUO-NEB) 2.5 mg-0.5 mg/3 mL nebulizer solution Take 3 mLs by nebulization every 4 (four) hours as needed for Wheezing or Shortness of Breath. Rescue  Qty: 180 mL, Refills: 0    Comments: BEDSIDE DELIVERY      carvediloL (COREG) 12.5 MG tablet Take 1 tablet (12.5 mg total) by mouth 2 (two) times daily with meals.  Qty: 60 tablet, Refills: 11    Comments: .      furosemide (LASIX) 20 MG tablet Take 1 tablet (20 mg total) by mouth once daily.  Qty: 30 tablet, Refills: 11      pantoprazole (PROTONIX) 40 MG tablet Take 1 tablet (40 mg total) by mouth 2 (two) times daily.  Qty: 60 tablet, Refills: 1         STOP taking these medications       SPIRIVA WITH HANDIHALER 18 mcg inhalation capsule Comments:   Reason for Stopping:         tiotropium (SPIRIVA) 18 mcg inhalation capsule Comments:   Reason for Stopping:         tiotropium (SPIRIVA) 18 mcg inhalation capsule Comments:   Reason for Stopping:                 I have seen and examined this patient within the last 30 days. My clinical findings that support the need for the home health skilled services and home bound status are the following:no   Weakness/numbness causing balance and gait disturbance due to COPD Exacerbation and Infection making it taxing to leave home.  Patient with medication mismanagement issues requiring home bound status as evidenced by  Poor understanding of medication regimen/dosage and Poor adherence to medication regimen/dosage.     Diet:   cardiac diet     Labs:  Report Lab results to PCP.    Referrals/ Consults  Physical Therapy to evaluate and treat. Evaluate for home safety and equipment needs; Establish/upgrade home exercise program. Perform / instruct on therapeutic exercises, gait training, transfer training, and Range of Motion.  Occupational Therapy to evaluate and treat. Evaluate home environment for safety and equipment needs. Perform/Instruct on transfers, ADL training, ROM, and therapeutic  exercises.   to evaluate for community resources/long-range planning.    Activities:   activity as tolerated    Nursing:   Agency to admit patient within 24 hours of hospital discharge unless specified on physician order or at patient request    SN to complete comprehensive assessment including routine vital signs. Instruct on disease process and s/s of complications to report to MD. Review/verify medication list sent home with the patient at time of discharge  and instruct patient/caregiver as needed. Frequency may be adjusted depending on start of care date.     Skilled nurse to perform up to 3 visits PRN for symptoms related to diagnosis    When multiple disciplines ordered:    Start of Care occurs on Sunday - Wednesday schedule remaining discipline evaluations as ordered on separate consecutive days following the start of care.    Thursday SOC -schedule subsequent evaluations Friday and Monday the following week.     Friday - Saturday SOC - schedule subsequent discipline evaluations on consecutive days starting Monday of the following week.    Notify MD if SBP > 160 or < 90; DBP > 90 or < 50; HR > 120 or < 50; Temp > 101; O2 < 88%    Ok to schedule additional visits based on staff availability and patient request on consecutive days within the home health episode.    For all post-discharge communication and subsequent orders please contact patient's primary care physician. If unable to reach primary care physician or do not receive response within 30 minutes, please contact [facility] on Call RN for clinical staff order clarification    Miscellaneous   Home Oxygen:  Oxygen at 1 L/min nasal canula to be used:  Continuously.   BiPAP  12/5 with 30%FiO2, heated, humidified.    Home Health Aide:  Nursing Three times weekly, Physical Therapy Three times weekly, Occupational Therapy Twice weekly, Medical Social Work Twice weekly and Respiratory Therapy Every other day    Wound Care Orders  no    I certify  that this patient is confined to his home and needs intermittent skilled nursing care, physical therapy and occupational therapy.        Janice Schultz MD

## 2022-04-04 ENCOUNTER — PATIENT OUTREACH (OUTPATIENT)
Dept: ADMINISTRATIVE | Facility: CLINIC | Age: 62
End: 2022-04-04
Payer: MEDICAID

## 2022-04-04 NOTE — PROGRESS NOTES
C3 nurse attempted to contact Baltazar Mccray  for a TCC post hospital discharge follow up call. No answer. Left voicemail with callback information. The patient does not have a scheduled HOSFU appointment. Message sent to PCP staff for assistance with scheduling visit with patient.

## 2022-04-05 ENCOUNTER — PES CALL (OUTPATIENT)
Dept: ADMINISTRATIVE | Facility: CLINIC | Age: 62
End: 2022-04-05
Payer: MEDICAID

## 2022-04-06 ENCOUNTER — PES CALL (OUTPATIENT)
Dept: ADMINISTRATIVE | Facility: CLINIC | Age: 62
End: 2022-04-06
Payer: MEDICAID

## 2022-04-07 ENCOUNTER — PES CALL (OUTPATIENT)
Dept: ADMINISTRATIVE | Facility: CLINIC | Age: 62
End: 2022-04-07
Payer: MEDICAID

## 2022-04-11 ENCOUNTER — OFFICE VISIT (OUTPATIENT)
Dept: HOME HEALTH SERVICES | Facility: CLINIC | Age: 62
End: 2022-04-11
Payer: MEDICAID

## 2022-04-11 DIAGNOSIS — J44.9 CHRONIC OBSTRUCTIVE PULMONARY DISEASE, UNSPECIFIED COPD TYPE: Primary | ICD-10-CM

## 2022-04-11 DIAGNOSIS — J44.1 COPD EXACERBATION: ICD-10-CM

## 2022-04-11 PROCEDURE — 99214 OFFICE O/P EST MOD 30 MIN: CPT | Mod: 95,,, | Performed by: NURSE PRACTITIONER

## 2022-04-11 PROCEDURE — 99214 PR OFFICE/OUTPT VISIT, EST, LEVL IV, 30-39 MIN: ICD-10-PCS | Mod: 95,,, | Performed by: NURSE PRACTITIONER

## 2022-04-11 PROCEDURE — 3044F PR MOST RECENT HEMOGLOBIN A1C LEVEL <7.0%: ICD-10-PCS | Mod: CPTII,95,, | Performed by: NURSE PRACTITIONER

## 2022-04-11 PROCEDURE — 3044F HG A1C LEVEL LT 7.0%: CPT | Mod: CPTII,95,, | Performed by: NURSE PRACTITIONER

## 2022-04-11 RX ORDER — BUDESONIDE, GLYCOPYRROLATE, AND FORMOTEROL FUMARATE 160; 9; 4.8 UG/1; UG/1; UG/1
2 AEROSOL, METERED RESPIRATORY (INHALATION) 2 TIMES DAILY
Qty: 10.7 G | Refills: 11 | Status: SHIPPED | OUTPATIENT
Start: 2022-04-11 | End: 2022-07-07 | Stop reason: SDUPTHER

## 2022-04-11 RX ORDER — ALBUTEROL SULFATE 90 UG/1
1-2 AEROSOL, METERED RESPIRATORY (INHALATION) EVERY 6 HOURS PRN
Qty: 8.5 G | Refills: 0 | Status: SHIPPED | OUTPATIENT
Start: 2022-04-11 | End: 2022-05-13 | Stop reason: SDUPTHER

## 2022-04-11 NOTE — PROGRESS NOTES
"  [facility]  Transition of Care Home Visit    Visit Date: 4/11/2022  Encounter Provider: Susan Rae, NP  PCP:  [facility]    PRESENTING HISTORY      Patient ID: Baltazar Mccray is a 61 y.o. male.    Consult Requested By:  Dr. Janice Schultz  Reason for Consult:  Hospital follow up    Baltazar is being evaluated at home due to  physical debility that presents a taxing effort to leave the home, to mitigate high risk of hospital readmission or due to the limited availability of reliable or safe options for transportation to the point of access to the provider. The visit meets the criteria for medical necessity as defined by CMS as "health-care services needed to prevent, diagnose, or treat an illness, injury, condition, disease, or its symptoms and that meet accepted standards of medicine."  Prior to treatment on this visit the chart was reviewed and patient consent was obtained.        Chief Complaint: COPD      History of Present Illness: Mr. Baltazar Mccray is a 61 y.o. male who was recently admitted to [facility].    Admission Date: 3/13/2022  Hospital Length of Stay: 19 days  Discharge Date and Time:  04/01/2022    HPI:   Mei Mccray 61yoM w/PMHx of CHF, COPD, HTN, AFib who presented to [facility] w/ shortness of breath 03/13.  Per EMS, patient had been feeling short of breath all day.  He was found to be hypoxic with increased work of breathing. En route to ED, he received 1 DuoNeb, Solu-Medrol 125mg, magnesium 2 g and 2.5 of Versed for acute agitation.  He was placed on CPAP.      Upon arrival to ED,  patient was tachypneic to 30s and hypertensive 150s/60s. He was transitioned from CPAP to BIPAP. Labs notable for WBC 12.8, Hg 12.5, Bicarb 34, , VBG 7.209/PCO2 104/PO2 27/PHCO3 41/BE 14+- while on Bipap. CXR w/interstitial coarsening- interstitial pneumonia vs.pulm edema. Admitted to MICU due to need for continuous bipap.     Hospital Course:   ICU Course:  Patient was started on " azithro/rocephin for suspected CAP/ treatment for COPD exacerbation. Continued on prednisone 40mg qday. Breathing treatments set at every 6 hours. Patient was weaned to bipap at nighttime and w/naps. On 3L BNC. Most recent VBG w/pH 7.37/pCO2 30/pO2 30/pHCO3 43/BE 18. Deemed stable for transfer to hospital medicine 03/14.     Hospital Medicine Course:  Patient transferred to hospital medicine service 03/16. He was doing well- on home oxygen settings. Repeat ECG with normal Qtc. Patient was transitioned to levofloxacin to complete 5d course and to complete 5d of prednisone total. Outpatient follow-up scheduled w/PCP and Pulmonology. Patient was stable for discharge to home 03/16, but had delays in arranging home bipap. Awaited approval from insurance company. Patient did very well with BiPAP.  __________________________________________________________  Established Patient - Audio Only Telehealth Visit     The patient location is: Home  The chief complaint leading to consultation is: Hospital follow up  Visit type: Virtual visit with audio only (telephone)  Total time spent with patient: 26 minutes       The reason for the audio only service rather than synchronous audio and video virtual visit was related to technical difficulties or patient preference/necessity.     Each patient to whom I provide medical services by telemedicine is:  (1) informed of the relationship between the physician and patient and the respective role of any other health care provider with respect to management of the patient; and (2) notified that they may decline to receive medical services by telemedicine and may withdraw from such care at any time. Patient verbally consented to receive this service via voice-only telephone call.       HPI: as above.  Patient was scheduled for in home evaluation, however he reports he is not available for in home evaluation but could speak with me over the phone. He sees Dr. Schultz as his PCP. He endorses  eating x 3  meals per day. He endorses regular BMs.      Fall precautions reinforced. Education completed ~ 15 minutes. Plan to follow up.     Denies fever, chest pain, shortness of breath, nausea, vomiting, diarrhea. Risks of environmental exposure to coronavirus discussed including: social distancing, hand hygiene, and limiting departures from the home for necessities only.  Reports understanding and willingness to comply.  All hospital discharge orders reviewed and being followed, all medications reconciled and reviewed, patient and family verbalized understanding. No other needs identified at this time.     Assessment and plan:   COPD  Chronic respiratory failure with hypoxia and hypercapnia  Continue home O2 2-3LPM  Continue rescue inhalers  Continue CPAP QHS     Congestive heart failure  Hypertension    Continue Coreg and Lasix    1/29/2022 TTE   · The left ventricle is normal in size with normal systolic function.  · Mild left atrial enlargement.  · There is pulmonary hypertension.  · The estimated ejection fraction is 55%.  · Grade I left ventricular diastolic dysfunction.  · Study quality was technically difficult  · Normal right ventricular size with normal right ventricular systolic function.  · Normal central venous pressure (3 mmHg).  · The estimated PA systolic pressure is 50 mmHg.  · Interatrial septal aneurysm  · Mild tricuspid regurgitation      Tobacco abuse  Assistance with smoking cessation was offered, including:  []  Medications  [x]  Counseling  []  Printed Information on Smoking Cessation  []  Referral to a Smoking Cessation Program    Patient was counseled regarding smoking for 3-10 minutes.        This service was not originating from a related E/M service provided within the previous 7 days nor will  to an E/M service or procedure within the next 24 hours or my soonest available appointment.  Prevailing standard of care was able to be met in this audio-only visit.        PAST  HISTORY:     Past Medical History:   Diagnosis Date    Asthma     Atrial fibrillation with rapid ventricular response     CHF (congestive heart failure)     COPD (chronic obstructive pulmonary disease)     home O2 at night only    Coronary artery disease     Hypertension     Lung nodule     Pneumonia     Seizures        Past Surgical History:   Procedure Laterality Date    ESOPHAGOGASTRODUODENOSCOPY N/A 2/11/2022    Procedure: EGD (ESOPHAGOGASTRODUODENOSCOPY);  Surgeon: Cain Ortega MD;  Location: [facility] ENDO ([address] [place] [address]);  Service: Endoscopy;  Laterality: N/A;    LEFT HEART CATHETERIZATION  4/23/2020    Procedure: Left heart cath;  Surgeon: Nic Pollack MD;  Location: [facility] CATH LAB;  Service: Cardiology;;       Family History   Problem Relation Age of Onset    Cancer Father     Hypertension Sister     Stroke Sister     Stroke Brother     Diabetes Neg Hx     Heart disease Neg Hx        Social History     Socioeconomic History    Marital status: Single   Tobacco Use    Smoking status: Current Some Day Smoker     Packs/day: 0.25     Types: Cigarettes    Smokeless tobacco: Never Used    Tobacco comment: 1-2 per day   Substance and Sexual Activity    Alcohol use: Yes     Alcohol/week: 2.0 standard drinks     Types: 2 Cans of beer per week     Comment: every night    Drug use: No    Sexual activity: Not Currently   Social History Narrative    Patient' nephew is currently living with him in his apartment. Patient's sister Viry ([phone]) helps manage patient's care.            6/3/21    Patient is no longer staying with family. Patient is currently staying at the [facility] and working on getting into their program for more supportive housing.      Social Determinants of Health     Financial Resource Strain: Low Risk     Difficulty of Paying Living Expenses: Not very hard   Food Insecurity: No Food Insecurity    Worried About Running Out of Food in the Last Year: Never true    Ran Out  of Food in the Last Year: Never true   Transportation Needs: No Transportation Needs    Lack of Transportation (Medical): No    Lack of Transportation (Non-Medical): No   Physical Activity: Inactive    Days of Exercise per Week: 0 days    Minutes of Exercise per Session: 0 min   Stress: No Stress Concern Present    Feeling of Stress : Only a little   Social Connections: Unknown    Frequency of Communication with Friends and Family: More than three times a week    Frequency of Social Gatherings with Friends and Family: More than three times a week    Attends Pentecostalism Services: Never    Active Member of Clubs or Organizations: No    Attends Club or Organization Meetings: Never   Housing Stability: Unknown    Unable to Pay for Housing in the Last Year: No    Unstable Housing in the Last Year: No       MEDICATIONS & ALLERGIES:     Current Outpatient Medications on File Prior to Visit   Medication Sig Dispense Refill    acetaminophen (TYLENOL) 325 MG tablet Take 2 tablets (650 mg total) by mouth every 8 (eight) hours as needed for Pain or Temperature greater than (100.5).  0    albuterol (PROVENTIL/VENTOLIN HFA) 90 mcg/actuation inhaler Inhale 1-2 puffs into the lungs every 6 (six) hours as needed for Wheezing. Rescue 8.5 g 0    albuterol-ipratropium (DUO-NEB) 2.5 mg-0.5 mg/3 mL nebulizer solution Take 3 mLs by nebulization every 4 (four) hours as needed for Wheezing or Shortness of Breath. Rescue 180 mL 0    budesonide-glycopyr-formoterol (BREZTRI AEROSPHERE) 160-9-4.8 mcg/actuation HFAA Inhale 2 puffs into the lungs 2 (two) times daily. Rinse mouth after use. 10.7 g 11    carvediloL (COREG) 12.5 MG tablet Take 1 tablet (12.5 mg total) by mouth 2 (two) times daily with meals. 60 tablet 11    furosemide (LASIX) 20 MG tablet Take 1 tablet (20 mg total) by mouth once daily. 30 tablet 11    pantoprazole (PROTONIX) 40 MG tablet Take 1 tablet (40 mg total) by mouth 2 (two) times daily. 60 tablet 1     [DISCONTINUED] tiotropium bromide (SPIRIVA RESPIMAT) 2.5 mcg/actuation inhaler Inhale 2 puffs into the lungs Daily. Controller 4 g 5     No current facility-administered medications on file prior to visit.        Review of patient's allergies indicates:   Allergen Reactions    Benazepril Swelling    Duoneb [ipratropium-albuterol]      Report minor Tremors, pt seems to be tolerating well.       OBJECTIVE:     Vital Signs:  There were no vitals filed for this visit.  There is no height or weight on file to calculate BMI.       Laboratory  Lab Results   Component Value Date    WBC 6.78 04/01/2022    HGB 11.3 (L) 04/01/2022    HCT 36.9 (L) 04/01/2022    MCV 84 04/01/2022     04/01/2022     Lab Results   Component Value Date    INR 1.0 03/14/2022    INR 1.0 04/19/2020    INR 1.0 01/03/2020     Lab Results   Component Value Date    HGBA1C 5.7 (H) 02/10/2022     No results for input(s): POCTGLUCOSE in the last 72 hours.    Diagnostic Results:  n/a    TRANSITION OF CARE:     [facility] Contact Note: 04/04/2022    Family and/or Caretaker present at visit?  No.  Diagnostic tests reviewed/disposition: No diagnosic tests pending after this hospitalization.  Disease/illness education: Fall precautions  Home health/community services discussion/referrals: Patient does not have home health established from hospital visit.  They do not need home health.  If needed, we will set up home health for the patient.   Establishment or re-establishment of referral orders for community resources: No other necessary community resources.   Discussion with other health care providers: No discussion with other health care providers necessary.     Transition of Care Visit:     I have reviewed and updated the history and problem list.  I have reconciled the medication list.  I have discussed the hospitalization and current medical issues, prognosis and plans with the patient/family.  I  spent more than 50% of time discussing the care  with the patient/family.  Total Face-to-Face Encounter: 60 minutes.    Medications Reconciliation:   I have reconciled the patient's home medications and discharge medications with the patient/family. I have updated all changes.  Refer to After-Visit Medication List.    ASSESSMENT & PLAN:     HIGH RISK CONDITION(S):  Patient has a condition that poses threat to life and bodily function    Diagnoses and all orders for this visit:    CAssessment and plan:   COPD  Chronic respiratory failure with hypoxia and hypercapnia  Continue home O2 2-3LPM  Continue rescue inhalers  Continue CPAP QHS     Congestive heart failure  Hypertension    Continue Coreg and Lasix    1/29/2022 TTE   · The left ventricle is normal in size with normal systolic function.  · Mild left atrial enlargement.  · There is pulmonary hypertension.  · The estimated ejection fraction is 55%.  · Grade I left ventricular diastolic dysfunction.  · Study quality was technically difficult  · Normal right ventricular size with normal right ventricular systolic function.  · Normal central venous pressure (3 mmHg).  · The estimated PA systolic pressure is 50 mmHg.  · Interatrial septal aneurysm  · Mild tricuspid regurgitation      Tobacco abuse  Assistance with smoking cessation was offered, including:  []  Medications  [x]  Counseling  []  Printed Information on Smoking Cessation  []  Referral to a Smoking Cessation Program    Patient was counseled regarding smoking for 3-10 minutes.            Were controlled substances prescribed?  No    Instructions for the patient:    Scheduled Follow-up :  Future Appointments   Date Time Provider Department Center   4/22/2022  4:00 PM Jesusita Singletary MD [facility]       After Visit Medication List :     Medication List          Accurate as of April 11, 2022  9:14 AM. If you have any questions, ask your nurse or doctor.            CONTINUE taking these medications    acetaminophen 325 MG tablet  Commonly known as:  TYLENOL  Take 2 tablets (650 mg total) by mouth every 8 (eight) hours as needed for Pain or Temperature greater than (100.5).     albuterol 90 mcg/actuation inhaler  Commonly known as: PROVENTIL/VENTOLIN HFA  Inhale 1-2 puffs into the lungs every 6 (six) hours as needed for Wheezing. Rescue     albuterol-ipratropium 2.5 mg-0.5 mg/3 mL nebulizer solution  Commonly known as: DUO-NEB  Take 3 mLs by nebulization every 4 (four) hours as needed for Wheezing or Shortness of Breath. Rescue     BREZTRI AEROSPHERE 160-9-4.8 mcg/actuation Hfaa  Generic drug: budesonide-glycopyr-formoterol  Inhale 2 puffs into the lungs 2 (two) times daily. Rinse mouth after use.     carvediloL 12.5 MG tablet  Commonly known as: COREG  Take 1 tablet (12.5 mg total) by mouth 2 (two) times daily with meals.     furosemide 20 MG tablet  Commonly known as: LASIX  Take 1 tablet (20 mg total) by mouth once daily.     pantoprazole 40 MG tablet  Commonly known as: PROTONIX  Take 1 tablet (40 mg total) by mouth 2 (two) times daily.            Signature:  Suasn Rae NP    Patient consent obtained prior to treatment

## 2022-04-12 NOTE — PATIENT INSTRUCTIONS
Instructions:  - [facility] to schedule home follow-up visit with patient  as needed.  - Continue all medications, treatments and therapies as ordered.   - Follow all instructions, recommendations as discussed.  - Maintain Safety Precautions at all times.  - Attend all medical appointments as scheduled.  - For worsening symptoms: call Primary Care Physician or Nurse Practitioner.  - For emergencies, call 911 or immediately report to the nearest emergency room.  - Limit Risks of environmental exposure to coronavirus/COVID-19 as discussed including: social distancing, hand hygiene, and limiting departures from the home for necessities only.

## 2022-04-22 ENCOUNTER — TELEPHONE (OUTPATIENT)
Dept: PULMONOLOGY | Facility: CLINIC | Age: 62
End: 2022-04-22
Payer: MEDICAID

## 2022-05-13 ENCOUNTER — PATIENT OUTREACH (OUTPATIENT)
Dept: ADMINISTRATIVE | Facility: OTHER | Age: 62
End: 2022-05-13
Payer: MEDICAID

## 2022-05-13 ENCOUNTER — OUTPATIENT CASE MANAGEMENT (OUTPATIENT)
Dept: ADMINISTRATIVE | Facility: OTHER | Age: 62
End: 2022-05-13
Payer: MEDICAID

## 2022-05-13 ENCOUNTER — TELEPHONE (OUTPATIENT)
Dept: PULMONOLOGY | Facility: CLINIC | Age: 62
End: 2022-05-13
Payer: MEDICAID

## 2022-05-13 RX ORDER — ALBUTEROL SULFATE 90 UG/1
1-2 AEROSOL, METERED RESPIRATORY (INHALATION) EVERY 6 HOURS PRN
Qty: 8.5 G | Refills: 0 | Status: SHIPPED | OUTPATIENT
Start: 2022-05-13 | End: 2022-06-11 | Stop reason: SDUPTHER

## 2022-05-13 RX ORDER — ALBUTEROL SULFATE 90 UG/1
1-2 AEROSOL, METERED RESPIRATORY (INHALATION) EVERY 6 HOURS PRN
Qty: 8.5 G | Refills: 0 | Status: CANCELLED | OUTPATIENT
Start: 2022-05-13 | End: 2023-05-13

## 2022-05-13 NOTE — TELEPHONE ENCOUNTER
----- Message from Charlie Deras sent at 5/13/2022 11:24 AM CDT -----  [facility] [id number]  or pt @[phone]  Caller calling to r/s the 4-22nd, [facility] call

## 2022-05-13 NOTE — TELEPHONE ENCOUNTER
Spoke with patient, informed him that I'm contacting him in regards to his message. I advised patient that I can schdule his appointment on 7/7/22 for 10:30 am with Dr Pascal. Patient verbalized that he understand and accepted the appointment.

## 2022-05-16 ENCOUNTER — TELEPHONE (OUTPATIENT)
Dept: ADMINISTRATIVE | Facility: OTHER | Age: 62
End: 2022-05-16
Payer: MEDICAID

## 2022-05-17 ENCOUNTER — PATIENT OUTREACH (OUTPATIENT)
Dept: ADMINISTRATIVE | Facility: OTHER | Age: 62
End: 2022-05-17
Payer: MEDICAID

## 2022-05-18 NOTE — PROGRESS NOTES
CHW - Initial Contact    Janet Carmichael  Community Health Worker completed Social Determinant of Health questionnaire with patient via telephone today.    Pt identified barriers of most importance are: Transportation and Food   Referrals to community agencies completed with patient/caregiver consent outside of [facility] include: [facility].   Referrals were put through [facility] - yes:   Other information discussed the patient needs / wants help with: N/A  Follow up required: May 26, 2022

## 2022-05-27 ENCOUNTER — PATIENT OUTREACH (OUTPATIENT)
Dept: ADMINISTRATIVE | Facility: OTHER | Age: 62
End: 2022-05-27
Payer: MEDICAID

## 2022-05-27 ENCOUNTER — PATIENT MESSAGE (OUTPATIENT)
Dept: ADMINISTRATIVE | Facility: OTHER | Age: 62
End: 2022-05-27
Payer: MEDICAID

## 2022-05-27 NOTE — PROGRESS NOTES
CHW - Follow Up    Janet Carmichael. Community Health Worker contacted Mr. Mccray to follow up on his initial outreach.  CHW left a message on Pt's telephone.

## 2022-06-02 ENCOUNTER — PATIENT OUTREACH (OUTPATIENT)
Dept: ADMINISTRATIVE | Facility: OTHER | Age: 62
End: 2022-06-02
Payer: MEDICAID

## 2022-06-02 NOTE — PROGRESS NOTES
CHW - Follow Up    Janet Carmichael, Community Health Worker completed a follow up visit with patient via telephone today.  Pt/Caregiver reported: Mr. Mccray reported that his telephone was broken and he was unable to place or receive calls.  He is still have difficulty with this telephone. Ms. Nirav Carrion Left a message for rental assistance for [facility] and the [facility].  Community Health Worker provided:   MsGonzalo Carmichael will mail information to Mr. Mccray along with the [employer] with the [facility].      Follow-up Outreach - Due: 6/16/2022

## 2022-06-11 RX ORDER — ALBUTEROL SULFATE 90 UG/1
1-2 AEROSOL, METERED RESPIRATORY (INHALATION) EVERY 6 HOURS PRN
Qty: 8.5 G | Refills: 0 | Status: CANCELLED | OUTPATIENT
Start: 2022-06-11 | End: 2023-06-11

## 2022-06-13 RX ORDER — IPRATROPIUM BROMIDE AND ALBUTEROL SULFATE 2.5; .5 MG/3ML; MG/3ML
3 SOLUTION RESPIRATORY (INHALATION) EVERY 4 HOURS PRN
Qty: 180 ML | Refills: 0 | OUTPATIENT
Start: 2022-06-13 | End: 2023-06-13

## 2022-06-13 NOTE — TELEPHONE ENCOUNTER
Hi.  I have never met the patient.  Has an appt with Dr. Pascal.  Please route refill to Dr. Pascal.      Dr. Robison

## 2022-06-14 RX ORDER — ALBUTEROL SULFATE 90 UG/1
1-2 AEROSOL, METERED RESPIRATORY (INHALATION) EVERY 6 HOURS PRN
Qty: 8.5 G | Refills: 0 | Status: SHIPPED | OUTPATIENT
Start: 2022-06-14 | End: 2022-06-29 | Stop reason: SDUPTHER

## 2022-06-15 ENCOUNTER — PATIENT OUTREACH (OUTPATIENT)
Dept: ADMINISTRATIVE | Facility: OTHER | Age: 62
End: 2022-06-15
Payer: MEDICAID

## 2022-06-15 NOTE — PROGRESS NOTES
CHW - Follow Up    This Community Health Worker completed a follow up visit with patient via telephone today.  Pt/Caregiver reported: Mr. Mccray reported that he not received the messages from Ms. Ticora Mendelza regarding calling the [facility] for rental assistance.  Mr. Mccray also requested utility assistance.    Community Health Worker provided: CHW provided Pt with contact information for the [facility] for rental and utility assistance.   Follow-up Outreach - Due: 6/22/2022

## 2022-06-21 ENCOUNTER — PATIENT OUTREACH (OUTPATIENT)
Dept: ADMINISTRATIVE | Facility: OTHER | Age: 62
End: 2022-06-21
Payer: MEDICAID

## 2022-06-22 NOTE — PROGRESS NOTES
CHW - Follow Up    Janet Carmichael, Community Health Worker contacted Mr. Mccray for a follow up visit  via telephone today. CHW left a message on Pt's telephone.   Follow-up Outreach - Due: 6/30/2022

## 2022-06-29 ENCOUNTER — DOCUMENTATION ONLY (OUTPATIENT)
Dept: CASE MANAGEMENT | Facility: HOSPITAL | Age: 62
End: 2022-06-29
Payer: MEDICAID

## 2022-06-29 RX ORDER — ALBUTEROL SULFATE 90 UG/1
1-2 AEROSOL, METERED RESPIRATORY (INHALATION) EVERY 6 HOURS PRN
Qty: 8.5 G | Refills: 0 | Status: SHIPPED | OUTPATIENT
Start: 2022-06-29 | End: 2022-07-07 | Stop reason: SDUPTHER

## 2022-06-30 ENCOUNTER — PATIENT OUTREACH (OUTPATIENT)
Dept: ADMINISTRATIVE | Facility: OTHER | Age: 62
End: 2022-06-30
Payer: MEDICAID

## 2022-06-30 NOTE — PROGRESS NOTES
CHW - Unable to Contact    Community Health Worker to close episode at this time due to three missed attempts for patient contact.  Rental assistance information was provided to Mr. Mccray by voice mail by Nick and CHW mailing information to his home.

## 2022-07-07 ENCOUNTER — TELEPHONE (OUTPATIENT)
Dept: PULMONOLOGY | Facility: CLINIC | Age: 62
End: 2022-07-07
Payer: MEDICARE

## 2022-07-07 ENCOUNTER — OFFICE VISIT (OUTPATIENT)
Dept: PULMONOLOGY | Facility: CLINIC | Age: 62
End: 2022-07-07
Payer: MEDICARE

## 2022-07-07 VITALS
HEART RATE: 90 BPM | BODY MASS INDEX: 20.28 KG/M2 | HEIGHT: 67 IN | DIASTOLIC BLOOD PRESSURE: 79 MMHG | SYSTOLIC BLOOD PRESSURE: 125 MMHG | OXYGEN SATURATION: 95 % | WEIGHT: 129.19 LBS

## 2022-07-07 DIAGNOSIS — J96.21 ACUTE ON CHRONIC RESPIRATORY FAILURE WITH HYPOXIA AND HYPERCAPNIA: ICD-10-CM

## 2022-07-07 DIAGNOSIS — R91.1 LUNG NODULE: ICD-10-CM

## 2022-07-07 DIAGNOSIS — I70.0 AORTIC ATHEROSCLEROSIS: ICD-10-CM

## 2022-07-07 DIAGNOSIS — J44.9 CHRONIC OBSTRUCTIVE PULMONARY DISEASE, UNSPECIFIED COPD TYPE: ICD-10-CM

## 2022-07-07 DIAGNOSIS — R51.9 NONINTRACTABLE HEADACHE, UNSPECIFIED CHRONICITY PATTERN, UNSPECIFIED HEADACHE TYPE: Primary | ICD-10-CM

## 2022-07-07 DIAGNOSIS — J96.22 ACUTE ON CHRONIC RESPIRATORY FAILURE WITH HYPOXIA AND HYPERCAPNIA: ICD-10-CM

## 2022-07-07 PROCEDURE — 3044F PR MOST RECENT HEMOGLOBIN A1C LEVEL <7.0%: ICD-10-PCS | Mod: CPTII,S$GLB,, | Performed by: INTERNAL MEDICINE

## 2022-07-07 PROCEDURE — 1160F PR REVIEW ALL MEDS BY PRESCRIBER/CLIN PHARMACIST DOCUMENTED: ICD-10-PCS | Mod: CPTII,S$GLB,, | Performed by: INTERNAL MEDICINE

## 2022-07-07 PROCEDURE — 99214 OFFICE O/P EST MOD 30 MIN: CPT | Mod: S$GLB,,, | Performed by: INTERNAL MEDICINE

## 2022-07-07 PROCEDURE — 99214 PR OFFICE/OUTPT VISIT, EST, LEVL IV, 30-39 MIN: ICD-10-PCS | Mod: S$GLB,,, | Performed by: INTERNAL MEDICINE

## 2022-07-07 PROCEDURE — 1160F RVW MEDS BY RX/DR IN RCRD: CPT | Mod: CPTII,S$GLB,, | Performed by: INTERNAL MEDICINE

## 2022-07-07 PROCEDURE — 3008F PR BODY MASS INDEX (BMI) DOCUMENTED: ICD-10-PCS | Mod: CPTII,S$GLB,, | Performed by: INTERNAL MEDICINE

## 2022-07-07 PROCEDURE — 3078F PR MOST RECENT DIASTOLIC BLOOD PRESSURE < 80 MM HG: ICD-10-PCS | Mod: CPTII,S$GLB,, | Performed by: INTERNAL MEDICINE

## 2022-07-07 PROCEDURE — 99213 OFFICE O/P EST LOW 20 MIN: CPT | Mod: PBBFAC | Performed by: INTERNAL MEDICINE

## 2022-07-07 PROCEDURE — 99999 PR PBB SHADOW E&M-EST. PATIENT-LVL III: ICD-10-PCS | Mod: PBBFAC,,, | Performed by: INTERNAL MEDICINE

## 2022-07-07 PROCEDURE — 1159F PR MEDICATION LIST DOCUMENTED IN MEDICAL RECORD: ICD-10-PCS | Mod: CPTII,S$GLB,, | Performed by: INTERNAL MEDICINE

## 2022-07-07 PROCEDURE — 3074F PR MOST RECENT SYSTOLIC BLOOD PRESSURE < 130 MM HG: ICD-10-PCS | Mod: CPTII,S$GLB,, | Performed by: INTERNAL MEDICINE

## 2022-07-07 PROCEDURE — 99999 PR PBB SHADOW E&M-EST. PATIENT-LVL III: CPT | Mod: PBBFAC,,, | Performed by: INTERNAL MEDICINE

## 2022-07-07 PROCEDURE — 1159F MED LIST DOCD IN RCRD: CPT | Mod: CPTII,S$GLB,, | Performed by: INTERNAL MEDICINE

## 2022-07-07 PROCEDURE — 3078F DIAST BP <80 MM HG: CPT | Mod: CPTII,S$GLB,, | Performed by: INTERNAL MEDICINE

## 2022-07-07 PROCEDURE — 3008F BODY MASS INDEX DOCD: CPT | Mod: CPTII,S$GLB,, | Performed by: INTERNAL MEDICINE

## 2022-07-07 PROCEDURE — 3074F SYST BP LT 130 MM HG: CPT | Mod: CPTII,S$GLB,, | Performed by: INTERNAL MEDICINE

## 2022-07-07 PROCEDURE — 3044F HG A1C LEVEL LT 7.0%: CPT | Mod: CPTII,S$GLB,, | Performed by: INTERNAL MEDICINE

## 2022-07-07 RX ORDER — ROFLUMILAST 500 UG/1
500 TABLET ORAL DAILY
Qty: 30 TABLET | Refills: 5 | Status: ON HOLD | OUTPATIENT
Start: 2022-07-07 | End: 2022-07-24 | Stop reason: SDUPTHER

## 2022-07-07 RX ORDER — ALBUTEROL SULFATE 90 UG/1
1-2 AEROSOL, METERED RESPIRATORY (INHALATION) EVERY 6 HOURS PRN
Qty: 8.5 G | Refills: 11 | Status: ON HOLD | OUTPATIENT
Start: 2022-07-07 | End: 2022-07-24 | Stop reason: SDUPTHER

## 2022-07-07 RX ORDER — IPRATROPIUM BROMIDE AND ALBUTEROL SULFATE 2.5; .5 MG/3ML; MG/3ML
3 SOLUTION RESPIRATORY (INHALATION) EVERY 4 HOURS PRN
Qty: 180 ML | Refills: 11 | Status: ON HOLD | OUTPATIENT
Start: 2022-07-07 | End: 2022-10-20 | Stop reason: SDUPTHER

## 2022-07-07 RX ORDER — BUDESONIDE, GLYCOPYRROLATE, AND FORMOTEROL FUMARATE 160; 9; 4.8 UG/1; UG/1; UG/1
2 AEROSOL, METERED RESPIRATORY (INHALATION) 2 TIMES DAILY
Qty: 10.7 G | Refills: 11 | Status: ON HOLD | OUTPATIENT
Start: 2022-07-07 | End: 2022-11-21 | Stop reason: SDUPTHER

## 2022-07-07 RX ORDER — ACETAMINOPHEN 325 MG/1
650 TABLET ORAL EVERY 8 HOURS PRN
Refills: 0
Start: 2022-07-07 | End: 2022-08-31

## 2022-07-07 RX ORDER — ROFLUMILAST 250 UG/1
250 TABLET ORAL DAILY
Qty: 28 TABLET | Refills: 0 | Status: ON HOLD | OUTPATIENT
Start: 2022-07-07 | End: 2022-07-24 | Stop reason: HOSPADM

## 2022-07-07 NOTE — TELEPHONE ENCOUNTER
Spoke with patient  in regards to schuled appointment on today at 10:30 am.  Patient stated he's running late due to medical transportation .  Patient stated he's crossing [place] now.  I advised  I received his message and will forward this message to  to review and advise.  Patient verbalized he understands.

## 2022-07-07 NOTE — PROGRESS NOTES
"Subjective:       Patient ID: Baltazar Mccray is a 62 y.o. male.    Chief Complaint: COPD    HPI:   Baltazar Mccray is a 62 y.o. male new to me last seen by Edda Harmon 7/15/21 who presents for evaluation of COPD    Patient arrived 50 minutes late to his appointment.    COPD- multiple hospitalizations in the last year. Dyspnea with any activity. Worsening. Reports a cough throughout the day that is productive of yellow sputum. Continues to smoke. Denies chest pain, palpitations, f/c/ns. Using Breztri daily. Using oxygen PRN. States he does not smoke with oxygen on.    Denies rashes, myalgias, arthralgias, hair/nail changes, dysphagia, eye changes, raynauds, mucosal ulcerations    Exposures:  Work- States having work history at [employer] for appx 3 yrs in the 90's. 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   Tobacco- 1/4 ppd since he was a teenager  Inhalational Agents- denies  Mold- denies  Birds- denies  Medications- n/a    Childhood history of Lung Disease: Denies    Review of Systems   Constitutional: Positive for activity change. Negative for fever, chills, weight loss, appetite change, night sweats and weakness.   HENT: Negative for postnasal drip, sinus pressure, voice change and congestion.    Eyes: Negative for redness.   Respiratory: Positive for cough, sputum production, shortness of breath and dyspnea on extertion. Negative for wheezing and somnolence.    Cardiovascular: Negative for chest pain, palpitations and leg swelling.   Musculoskeletal: Negative for joint swelling and myalgias.   Skin: Negative for rash.   Gastrointestinal: Negative for acid reflux.   Neurological: Negative for syncope and weakness.   Psychiatric/Behavioral: Negative for sleep disturbance.         Social History     Tobacco Use    Smoking status: Current Some Day Smoker     Packs/day: 0.25     Types: Cigarettes    Smokeless tobacco: Never Used    Tobacco comment: 1-2 per day   Substance Use " "Topics    Alcohol use: Yes     Alcohol/week: 2.0 standard drinks     Types: 2 Cans of beer per week     Comment: every night       Review of patient's allergies indicates:   Allergen Reactions    Benazepril Swelling    Duoneb [ipratropium-albuterol]      Report minor Tremors, pt seems to be tolerating well.     Past Medical History:   Diagnosis Date    Asthma     Atrial fibrillation with rapid ventricular response     CHF (congestive heart failure)     COPD (chronic obstructive pulmonary disease)     home O2 at night only    Coronary artery disease     Hypertension     Lung nodule     Pneumonia     Seizures      Past Surgical History:   Procedure Laterality Date    ESOPHAGOGASTRODUODENOSCOPY N/A 2/11/2022    Procedure: EGD (ESOPHAGOGASTRODUODENOSCOPY);  Surgeon: Cain Ortega MD;  Location: [facility] ENDO ([address] [place] [address]);  Service: Endoscopy;  Laterality: N/A;    LEFT HEART CATHETERIZATION  4/23/2020    Procedure: Left heart cath;  Surgeon: Nic Pollack MD;  Location: [facility] CATH LAB;  Service: Cardiology;;     Current Outpatient Medications on File Prior to Visit   Medication Sig    carvediloL (COREG) 12.5 MG tablet Take 1 tablet (12.5 mg total) by mouth 2 (two) times daily with meals.    furosemide (LASIX) 20 MG tablet Take 1 tablet (20 mg total) by mouth once daily.    pantoprazole (PROTONIX) 40 MG tablet Take 1 tablet (40 mg total) by mouth 2 (two) times daily.    [DISCONTINUED] tiotropium bromide (SPIRIVA RESPIMAT) 2.5 mcg/actuation inhaler Inhale 2 puffs into the lungs Daily. Controller     No current facility-administered medications on file prior to visit.       Objective:      Vitals:    07/07/22 1120   BP: 125/79   BP Location: Left arm   Patient Position: Sitting   Pulse: 90   SpO2: 95%  Comment: 3.0   Weight: 58.6 kg (129 lb 3 oz)   Height: 5' 7" (1.702 m)     Physical Exam   Constitutional: He is oriented to person, place, and time. He appears well-developed and well-nourished.   HENT: "   Nose: No mucosal edema.   Mouth/Throat: Oropharynx is clear and moist. Mallampati Score: I.   Neck: No tracheal deviation present.   Cardiovascular: Normal rate, regular rhythm and intact distal pulses.   Pulmonary/Chest: Normal expansion, symmetric chest wall expansion, effort normal and breath sounds normal. No respiratory distress. He has no wheezes. He has no rhonchi. He has no rales.   Abdominal: He exhibits no distension.   Musculoskeletal:         General: No edema.      Cervical back: Neck supple.   Lymphadenopathy: No supraclavicular adenopathy is present.     He has no cervical adenopathy.   Neurological: He is alert and oriented to person, place, and time.   Skin: Skin is warm and dry. No cyanosis or erythema. Nails show no clubbing.   Psychiatric: He has a normal mood and affect.   Nursing note and vitals reviewed.      Personal Diagnostic Review    Laboratory:  4/1/22   Bicarb- 29  Eosinophils- 0.5      CT Chest 1/28/22- Images personally reviewed and compared to priors. I agree w/ the radiologist who notes;  1. Examination considered not blastic to the proximal segmental pulmonary arterial level without convincing evidence of acute pulmonary embolism.  2. No acute intrathoracic findings identified.  3. Appearance of the lungs without substantial interval change when compared to most recent CTA chest performed 12/21/2021.  Redemonstrated emphysema with areas of architectural distortion nonspecific ground-glass attenuation.  Stable appearance of 12-13 mm nodule in the left lower lobe.  For multiple solid nodules with any 6 mm or greater, Fleischner Society guidelines recommend follow up with non-contrast chest CT at 3-6 months and 18-24 months after discovery.  4. Additional details, as provided in the body report.    CXR 3/13/22- Images personally reviewed. I agree w/ the radiologist who notes  Left greater than right perihilar and upper lung zone predominant interval increased nonspecific  interstitial coarsening which can be seen with worsening interstitial type pneumonia from inflammatory or infectious process including atypical bacterial or viral etiology versus more atypical distribution of pulmonary edema.  No large consolidation.    PFTs 4/27/21- very severe obstruction and severe reduction in dlco    No flowsheet data found.        Assessment:     Problem List Items Addressed This Visit        Pulmonary    Chronic obstructive pulmonary disease (Chronic)     MMRC 3-4 symptoms of dyspnea-has home oxygen. Frequent exacerbator. Ongoing tobacco use    - Cont triple therapy  -Encouraged complete smoking cessation.  -Cont Breztri. Rinse mouth after use.   -Referal to pulm rehab placed- patient interested.   -Continue albuterol 2 puffs every 4-6 hours as needed for shortness of breath or wheezing.   -Continue home oxygen as prescribed.   - encouraged regular progressive exercise  - Start roflumilast for frequent exacerbations         Relevant Medications    budesonide-glycopyr-formoterol (BREZTRI AEROSPHERE) 160-9-4.8 mcg/actuation HFAA    albuterol-ipratropium (DUO-NEB) 2.5 mg-0.5 mg/3 mL nebulizer solution    albuterol (PROVENTIL/VENTOLIN HFA) 90 mcg/actuation inhaler    roflumilast 250 mcg Tab    roflumilast (DALIRESP) 500 mcg Tab    Other Relevant Orders    Ambulatory referral/consult to Pulmonary Rehab    Lung nodule     Stable since 2020. Continue yearly screening. Next will be 1/2023           Acute on chronic respiratory failure with hypoxia and hypercapnia       Cardiac/Vascular    Aortic atherosclerosis     Noted on CT Chest. Mnmt per PCP             Other Visit Diagnoses     Nonintractable headache, unspecified chronicity pattern, unspecified headache type    -  Primary    Relevant Medications    acetaminophen (TYLENOL) 325 MG tablet

## 2022-07-07 NOTE — TELEPHONE ENCOUNTER
----- Message from Noemi Vasquez sent at 7/7/2022 10:40 AM CDT -----  Regarding: Running Late  Contact: [phone]  Running Late    Who Called: Baltazar  Reason: Transportation  Call Back Number:[phone]  Additional Information: The patient will be arriving about 20 minutes.

## 2022-07-14 PROBLEM — I70.0 AORTIC ATHEROSCLEROSIS: Status: ACTIVE | Noted: 2022-07-14

## 2022-07-14 NOTE — ASSESSMENT & PLAN NOTE
MMRC 3-4 symptoms of dyspnea-has home oxygen. Frequent exacerbator. Ongoing tobacco use    - Cont triple therapy  -Encouraged complete smoking cessation.  -Cont Breztri. Rinse mouth after use.   -Referal to pulm rehab placed- patient interested.   -Continue albuterol 2 puffs every 4-6 hours as needed for shortness of breath or wheezing.   -Continue home oxygen as prescribed.   - encouraged regular progressive exercise

## 2022-07-22 ENCOUNTER — HOSPITAL ENCOUNTER (INPATIENT)
Facility: HOSPITAL | Age: 62
LOS: 2 days | Discharge: HOME | DRG: 190 | End: 2022-07-24
Attending: EMERGENCY MEDICINE | Admitting: INTERNAL MEDICINE
Payer: MEDICARE

## 2022-07-22 DIAGNOSIS — J96.21 ACUTE ON CHRONIC RESPIRATORY FAILURE WITH HYPOXEMIA: ICD-10-CM

## 2022-07-22 DIAGNOSIS — J44.9 CHRONIC OBSTRUCTIVE PULMONARY DISEASE, UNSPECIFIED COPD TYPE: ICD-10-CM

## 2022-07-22 DIAGNOSIS — J44.1 COPD EXACERBATION: Primary | ICD-10-CM

## 2022-07-22 DIAGNOSIS — R06.02 SHORTNESS OF BREATH: ICD-10-CM

## 2022-07-22 PROBLEM — E87.1 HYPONATREMIA: Status: ACTIVE | Noted: 2022-07-22

## 2022-07-22 PROBLEM — J96.02 ACUTE RESPIRATORY FAILURE WITH HYPOXIA AND HYPERCARBIA: Status: ACTIVE | Noted: 2020-01-29

## 2022-07-22 PROBLEM — J20.9 ACUTE BRONCHITIS: Status: ACTIVE | Noted: 2022-07-22

## 2022-07-22 PROBLEM — J96.10 CHRONIC RESPIRATORY FAILURE: Status: RESOLVED | Noted: 2018-08-12 | Resolved: 2022-07-22

## 2022-07-22 PROBLEM — J96.01 ACUTE RESPIRATORY FAILURE WITH HYPOXIA AND HYPERCARBIA: Status: ACTIVE | Noted: 2020-01-29

## 2022-07-22 PROBLEM — D50.9 MICROCYTIC ANEMIA: Status: ACTIVE | Noted: 2022-07-22

## 2022-07-22 LAB
ALBUMIN SERPL BCP-MCNC: 3.2 G/DL (ref 3.5–5.2)
ALLENS TEST: ABNORMAL
ALLENS TEST: ABNORMAL
ALP SERPL-CCNC: 70 U/L (ref 55–135)
ALT SERPL W/O P-5'-P-CCNC: 7 U/L (ref 10–44)
ANION GAP SERPL CALC-SCNC: 11 MMOL/L (ref 8–16)
AST SERPL-CCNC: 20 U/L (ref 10–40)
BASOPHILS # BLD AUTO: 0.1 K/UL (ref 0–0.2)
BASOPHILS NFR BLD: 1.5 % (ref 0–1.9)
BILIRUB SERPL-MCNC: 0.5 MG/DL (ref 0.1–1)
BNP SERPL-MCNC: 81 PG/ML (ref 0–99)
BUN SERPL-MCNC: 4 MG/DL (ref 6–20)
CALCIUM SERPL-MCNC: 9.6 MG/DL (ref 8.7–10.5)
CHLORIDE SERPL-SCNC: 88 MMOL/L (ref 95–110)
CO2 SERPL-SCNC: 30 MMOL/L (ref 23–29)
CREAT SERPL-MCNC: 0.8 MG/DL (ref 0.5–1.4)
DELSYS: ABNORMAL
DIFFERENTIAL METHOD: ABNORMAL
EOSINOPHIL # BLD AUTO: 0.5 K/UL (ref 0–0.5)
EOSINOPHIL NFR BLD: 7.2 % (ref 0–8)
ERYTHROCYTE [DISTWIDTH] IN BLOOD BY AUTOMATED COUNT: 20.1 % (ref 11.5–14.5)
EST. GFR  (AFRICAN AMERICAN): >60 ML/MIN/1.73 M^2
EST. GFR  (NON AFRICAN AMERICAN): >60 ML/MIN/1.73 M^2
FLOW: 3
GLUCOSE SERPL-MCNC: 71 MG/DL (ref 70–110)
HCO3 UR-SCNC: 37.4 MMOL/L (ref 24–28)
HCO3 UR-SCNC: 39.6 MMOL/L (ref 24–28)
HCT VFR BLD AUTO: 42.9 % (ref 40–54)
HGB BLD-MCNC: 13.5 G/DL (ref 14–18)
HIV 1+2 AB+HIV1 P24 AG SERPL QL IA: NEGATIVE
IMM GRANULOCYTES # BLD AUTO: 0.02 K/UL (ref 0–0.04)
IMM GRANULOCYTES NFR BLD AUTO: 0.3 % (ref 0–0.5)
IRON SERPL-MCNC: 20 UG/DL (ref 45–160)
LYMPHOCYTES # BLD AUTO: 2.6 K/UL (ref 1–4.8)
LYMPHOCYTES NFR BLD: 39.2 % (ref 18–48)
MCH RBC QN AUTO: 23.2 PG (ref 27–31)
MCHC RBC AUTO-ENTMCNC: 31.5 G/DL (ref 32–36)
MCV RBC AUTO: 74 FL (ref 82–98)
MODE: ABNORMAL
MONOCYTES # BLD AUTO: 0.6 K/UL (ref 0.3–1)
MONOCYTES NFR BLD: 8.8 % (ref 4–15)
NEUTROPHILS # BLD AUTO: 2.8 K/UL (ref 1.8–7.7)
NEUTROPHILS NFR BLD: 43 % (ref 38–73)
NRBC BLD-RTO: 0 /100 WBC
PCO2 BLDA: 72.1 MMHG (ref 35–45)
PCO2 BLDA: 80.2 MMHG (ref 35–45)
PH SMN: 7.3 [PH] (ref 7.35–7.45)
PH SMN: 7.32 [PH] (ref 7.35–7.45)
PLATELET # BLD AUTO: 437 K/UL (ref 150–450)
PMV BLD AUTO: 9.9 FL (ref 9.2–12.9)
PO2 BLDA: 17 MMHG (ref 40–60)
PO2 BLDA: 90 MMHG (ref 80–100)
POC BE: 13 MMOL/L
POC BE: 8 MMOL/L
POC SATURATED O2: 19 % (ref 95–100)
POC SATURATED O2: 96 % (ref 95–100)
POC TCO2: 40 MMOL/L (ref 23–27)
POC TCO2: 42 MMOL/L (ref 24–29)
POTASSIUM SERPL-SCNC: 5 MMOL/L (ref 3.5–5.1)
PROT SERPL-MCNC: 7.9 G/DL (ref 6–8.4)
RBC # BLD AUTO: 5.81 M/UL (ref 4.6–6.2)
SAMPLE: ABNORMAL
SAMPLE: ABNORMAL
SARS-COV-2 RDRP RESP QL NAA+PROBE: NEGATIVE
SATURATED IRON: 6 % (ref 20–50)
SITE: ABNORMAL
SITE: ABNORMAL
SODIUM SERPL-SCNC: 129 MMOL/L (ref 136–145)
SP02: 99
TOTAL IRON BINDING CAPACITY: 330 UG/DL (ref 250–450)
TRANSFERRIN SERPL-MCNC: 223 MG/DL (ref 200–375)
TROPONIN I SERPL DL<=0.01 NG/ML-MCNC: 0.01 NG/ML (ref 0–0.03)
WBC # BLD AUTO: 6.5 K/UL (ref 3.9–12.7)

## 2022-07-22 PROCEDURE — 99291 CRITICAL CARE FIRST HOUR: CPT | Mod: 25

## 2022-07-22 PROCEDURE — 96374 THER/PROPH/DIAG INJ IV PUSH: CPT

## 2022-07-22 PROCEDURE — 25000003 PHARM REV CODE 250: Performed by: INTERNAL MEDICINE

## 2022-07-22 PROCEDURE — 84484 ASSAY OF TROPONIN QUANT: CPT | Performed by: EMERGENCY MEDICINE

## 2022-07-22 PROCEDURE — 21400001 HC TELEMETRY ROOM

## 2022-07-22 PROCEDURE — 83880 ASSAY OF NATRIURETIC PEPTIDE: CPT | Performed by: EMERGENCY MEDICINE

## 2022-07-22 PROCEDURE — 86803 HEPATITIS C AB TEST: CPT | Performed by: PHYSICIAN ASSISTANT

## 2022-07-22 PROCEDURE — 82803 BLOOD GASES ANY COMBINATION: CPT

## 2022-07-22 PROCEDURE — 27000221 HC OXYGEN, UP TO 24 HOURS

## 2022-07-22 PROCEDURE — 25000242 PHARM REV CODE 250 ALT 637 W/ HCPCS: Performed by: EMERGENCY MEDICINE

## 2022-07-22 PROCEDURE — 94644 CONT INHLJ TX 1ST HOUR: CPT

## 2022-07-22 PROCEDURE — 94761 N-INVAS EAR/PLS OXIMETRY MLT: CPT

## 2022-07-22 PROCEDURE — 36600 WITHDRAWAL OF ARTERIAL BLOOD: CPT

## 2022-07-22 PROCEDURE — 63600175 PHARM REV CODE 636 W HCPCS: Performed by: INTERNAL MEDICINE

## 2022-07-22 PROCEDURE — 93005 ELECTROCARDIOGRAM TRACING: CPT

## 2022-07-22 PROCEDURE — 99900035 HC TECH TIME PER 15 MIN (STAT)

## 2022-07-22 PROCEDURE — 25000242 PHARM REV CODE 250 ALT 637 W/ HCPCS: Performed by: INTERNAL MEDICINE

## 2022-07-22 PROCEDURE — 82607 VITAMIN B-12: CPT | Performed by: INTERNAL MEDICINE

## 2022-07-22 PROCEDURE — 93010 ELECTROCARDIOGRAM REPORT: CPT | Mod: ,,, | Performed by: INTERNAL MEDICINE

## 2022-07-22 PROCEDURE — 94640 AIRWAY INHALATION TREATMENT: CPT

## 2022-07-22 PROCEDURE — 25000003 PHARM REV CODE 250: Performed by: EMERGENCY MEDICINE

## 2022-07-22 PROCEDURE — 99291 CRITICAL CARE FIRST HOUR: CPT | Mod: CS,,, | Performed by: EMERGENCY MEDICINE

## 2022-07-22 PROCEDURE — U0002 COVID-19 LAB TEST NON-CDC: HCPCS | Performed by: EMERGENCY MEDICINE

## 2022-07-22 PROCEDURE — 80053 COMPREHEN METABOLIC PANEL: CPT | Performed by: EMERGENCY MEDICINE

## 2022-07-22 PROCEDURE — 87389 HIV-1 AG W/HIV-1&-2 AB AG IA: CPT | Performed by: PHYSICIAN ASSISTANT

## 2022-07-22 PROCEDURE — 82746 ASSAY OF FOLIC ACID SERUM: CPT | Performed by: INTERNAL MEDICINE

## 2022-07-22 PROCEDURE — 25000242 PHARM REV CODE 250 ALT 637 W/ HCPCS

## 2022-07-22 PROCEDURE — 36415 COLL VENOUS BLD VENIPUNCTURE: CPT | Performed by: INTERNAL MEDICINE

## 2022-07-22 PROCEDURE — 63600175 PHARM REV CODE 636 W HCPCS: Performed by: EMERGENCY MEDICINE

## 2022-07-22 PROCEDURE — 99291 PR CRITICAL CARE, E/M 30-74 MINUTES: ICD-10-PCS | Mod: CS,,, | Performed by: EMERGENCY MEDICINE

## 2022-07-22 PROCEDURE — 85025 COMPLETE CBC W/AUTO DIFF WBC: CPT | Performed by: EMERGENCY MEDICINE

## 2022-07-22 PROCEDURE — 84466 ASSAY OF TRANSFERRIN: CPT | Performed by: INTERNAL MEDICINE

## 2022-07-22 PROCEDURE — 27000190 HC CPAP FULL FACE MASK W/VALVE

## 2022-07-22 PROCEDURE — 93010 EKG 12-LEAD: ICD-10-PCS | Mod: ,,, | Performed by: INTERNAL MEDICINE

## 2022-07-22 RX ORDER — GUAIFENESIN/DEXTROMETHORPHAN 100-10MG/5
10 SYRUP ORAL EVERY 6 HOURS
Status: COMPLETED | OUTPATIENT
Start: 2022-07-22 | End: 2022-07-24

## 2022-07-22 RX ORDER — TALC
6 POWDER (GRAM) TOPICAL NIGHTLY PRN
Status: DISCONTINUED | OUTPATIENT
Start: 2022-07-22 | End: 2022-07-24 | Stop reason: HOSPADM

## 2022-07-22 RX ORDER — IPRATROPIUM BROMIDE AND ALBUTEROL SULFATE 2.5; .5 MG/3ML; MG/3ML
3 SOLUTION RESPIRATORY (INHALATION)
Status: COMPLETED | OUTPATIENT
Start: 2022-07-22 | End: 2022-07-22

## 2022-07-22 RX ORDER — ALBUTEROL SULFATE 2.5 MG/.5ML
15 SOLUTION RESPIRATORY (INHALATION)
Status: COMPLETED | OUTPATIENT
Start: 2022-07-22 | End: 2022-07-22

## 2022-07-22 RX ORDER — CARVEDILOL 12.5 MG/1
12.5 TABLET ORAL 2 TIMES DAILY WITH MEALS
Status: DISCONTINUED | OUTPATIENT
Start: 2022-07-22 | End: 2022-07-24 | Stop reason: HOSPADM

## 2022-07-22 RX ORDER — IPRATROPIUM BROMIDE AND ALBUTEROL SULFATE 2.5; .5 MG/3ML; MG/3ML
3 SOLUTION RESPIRATORY (INHALATION) EVERY 6 HOURS
Status: DISCONTINUED | OUTPATIENT
Start: 2022-07-22 | End: 2022-07-24 | Stop reason: HOSPADM

## 2022-07-22 RX ORDER — SODIUM CHLORIDE 9 MG/ML
INJECTION, SOLUTION INTRAVENOUS CONTINUOUS
Status: ACTIVE | OUTPATIENT
Start: 2022-07-22 | End: 2022-07-23

## 2022-07-22 RX ORDER — ACETAMINOPHEN 325 MG/1
650 TABLET ORAL
Status: COMPLETED | OUTPATIENT
Start: 2022-07-22 | End: 2022-07-22

## 2022-07-22 RX ORDER — TIZANIDINE 4 MG/1
4 TABLET ORAL EVERY 8 HOURS PRN
Status: DISCONTINUED | OUTPATIENT
Start: 2022-07-22 | End: 2022-07-24 | Stop reason: HOSPADM

## 2022-07-22 RX ORDER — OXYCODONE HYDROCHLORIDE 5 MG/1
5 TABLET ORAL EVERY 6 HOURS PRN
Status: DISCONTINUED | OUTPATIENT
Start: 2022-07-22 | End: 2022-07-24 | Stop reason: HOSPADM

## 2022-07-22 RX ORDER — DOXYCYCLINE HYCLATE 100 MG
100 TABLET ORAL EVERY 12 HOURS
Status: DISCONTINUED | OUTPATIENT
Start: 2022-07-22 | End: 2022-07-24 | Stop reason: HOSPADM

## 2022-07-22 RX ORDER — PREDNISONE 20 MG/1
40 TABLET ORAL DAILY
Status: DISCONTINUED | OUTPATIENT
Start: 2022-07-22 | End: 2022-07-24 | Stop reason: HOSPADM

## 2022-07-22 RX ORDER — PANTOPRAZOLE SODIUM 40 MG/1
40 TABLET, DELAYED RELEASE ORAL 2 TIMES DAILY
Status: DISCONTINUED | OUTPATIENT
Start: 2022-07-22 | End: 2022-07-24 | Stop reason: HOSPADM

## 2022-07-22 RX ORDER — BENZONATATE 100 MG/1
100 CAPSULE ORAL 3 TIMES DAILY PRN
Status: DISCONTINUED | OUTPATIENT
Start: 2022-07-22 | End: 2022-07-24 | Stop reason: HOSPADM

## 2022-07-22 RX ORDER — ENOXAPARIN SODIUM 100 MG/ML
40 INJECTION SUBCUTANEOUS EVERY 24 HOURS
Status: DISCONTINUED | OUTPATIENT
Start: 2022-07-22 | End: 2022-07-24 | Stop reason: HOSPADM

## 2022-07-22 RX ORDER — ROFLUMILAST 500 UG/1
500 TABLET ORAL DAILY
Status: DISCONTINUED | OUTPATIENT
Start: 2022-07-23 | End: 2022-07-24 | Stop reason: HOSPADM

## 2022-07-22 RX ORDER — ACETAMINOPHEN 325 MG/1
650 TABLET ORAL EVERY 8 HOURS PRN
Status: DISCONTINUED | OUTPATIENT
Start: 2022-07-22 | End: 2022-07-24 | Stop reason: HOSPADM

## 2022-07-22 RX ORDER — ONDANSETRON 8 MG/1
8 TABLET, ORALLY DISINTEGRATING ORAL EVERY 8 HOURS PRN
Status: DISCONTINUED | OUTPATIENT
Start: 2022-07-22 | End: 2022-07-24 | Stop reason: HOSPADM

## 2022-07-22 RX ORDER — METHYLPREDNISOLONE SOD SUCC 125 MG
125 VIAL (EA) INJECTION
Status: COMPLETED | OUTPATIENT
Start: 2022-07-22 | End: 2022-07-22

## 2022-07-22 RX ORDER — FUROSEMIDE 20 MG/1
20 TABLET ORAL DAILY
Status: DISCONTINUED | OUTPATIENT
Start: 2022-07-23 | End: 2022-07-23

## 2022-07-22 RX ORDER — PREDNISONE 20 MG/1
40 TABLET ORAL DAILY
Status: DISCONTINUED | OUTPATIENT
Start: 2022-07-23 | End: 2022-07-22

## 2022-07-22 RX ORDER — FLUTICASONE FUROATE AND VILANTEROL 100; 25 UG/1; UG/1
1 POWDER RESPIRATORY (INHALATION) DAILY
Status: DISCONTINUED | OUTPATIENT
Start: 2022-07-23 | End: 2022-07-24 | Stop reason: HOSPADM

## 2022-07-22 RX ORDER — IPRATROPIUM BROMIDE AND ALBUTEROL SULFATE 2.5; .5 MG/3ML; MG/3ML
3 SOLUTION RESPIRATORY (INHALATION) EVERY 4 HOURS
Status: DISCONTINUED | OUTPATIENT
Start: 2022-07-22 | End: 2022-07-22

## 2022-07-22 RX ORDER — IPRATROPIUM BROMIDE AND ALBUTEROL SULFATE 2.5; .5 MG/3ML; MG/3ML
3 SOLUTION RESPIRATORY (INHALATION) EVERY 4 HOURS PRN
Status: DISCONTINUED | OUTPATIENT
Start: 2022-07-22 | End: 2022-07-24 | Stop reason: HOSPADM

## 2022-07-22 RX ORDER — AMOXICILLIN 250 MG
1 CAPSULE ORAL 2 TIMES DAILY
Status: DISCONTINUED | OUTPATIENT
Start: 2022-07-22 | End: 2022-07-24 | Stop reason: HOSPADM

## 2022-07-22 RX ORDER — SODIUM CHLORIDE 0.9 % (FLUSH) 0.9 %
3 SYRINGE (ML) INJECTION
Status: DISCONTINUED | OUTPATIENT
Start: 2022-07-22 | End: 2022-07-24 | Stop reason: HOSPADM

## 2022-07-22 RX ORDER — IPRATROPIUM BROMIDE AND ALBUTEROL SULFATE 2.5; .5 MG/3ML; MG/3ML
SOLUTION RESPIRATORY (INHALATION)
Status: COMPLETED
Start: 2022-07-22 | End: 2022-07-22

## 2022-07-22 RX ADMIN — IPRATROPIUM BROMIDE AND ALBUTEROL SULFATE 3 ML: .5; 3 SOLUTION RESPIRATORY (INHALATION) at 09:07

## 2022-07-22 RX ADMIN — GUAIFENESIN AND DEXTROMETHORPHAN 10 ML: 100; 10 SYRUP ORAL at 08:07

## 2022-07-22 RX ADMIN — DOXYCYCLINE HYCLATE 100 MG: 100 TABLET, COATED ORAL at 08:07

## 2022-07-22 RX ADMIN — ALBUTEROL SULFATE 15 MG: 2.5 SOLUTION RESPIRATORY (INHALATION) at 10:07

## 2022-07-22 RX ADMIN — CARVEDILOL 12.5 MG: 12.5 TABLET, FILM COATED ORAL at 05:07

## 2022-07-22 RX ADMIN — SENNOSIDES AND DOCUSATE SODIUM 1 TABLET: 50; 8.6 TABLET ORAL at 08:07

## 2022-07-22 RX ADMIN — IPRATROPIUM BROMIDE AND ALBUTEROL SULFATE 3 ML: 2.5; .5 SOLUTION RESPIRATORY (INHALATION) at 08:07

## 2022-07-22 RX ADMIN — IPRATROPIUM BROMIDE AND ALBUTEROL SULFATE 3 ML: 2.5; .5 SOLUTION RESPIRATORY (INHALATION) at 05:07

## 2022-07-22 RX ADMIN — PREDNISONE 40 MG: 20 TABLET ORAL at 08:07

## 2022-07-22 RX ADMIN — TIZANIDINE 4 MG: 4 TABLET ORAL at 09:07

## 2022-07-22 RX ADMIN — PANTOPRAZOLE SODIUM 40 MG: 40 TABLET, DELAYED RELEASE ORAL at 08:07

## 2022-07-22 RX ADMIN — SODIUM CHLORIDE: 0.9 INJECTION, SOLUTION INTRAVENOUS at 08:07

## 2022-07-22 RX ADMIN — ACETAMINOPHEN 650 MG: 325 TABLET ORAL at 10:07

## 2022-07-22 RX ADMIN — ENOXAPARIN SODIUM 40 MG: 100 INJECTION SUBCUTANEOUS at 05:07

## 2022-07-22 RX ADMIN — METHYLPREDNISOLONE SODIUM SUCCINATE 125 MG: 125 INJECTION, POWDER, FOR SOLUTION INTRAMUSCULAR; INTRAVENOUS at 09:07

## 2022-07-22 NOTE — ED NOTES
Upon arrival to ED room pt was tachypneic, SpO2 in the 60's. MD and 2 RN's at bedside. RT notified needed for breathing treatment.

## 2022-07-22 NOTE — ED NOTES
Pt updated on POC and notified he was accepted at [facility] facility for transfer.

## 2022-07-22 NOTE — ED TRIAGE NOTES
Pt presents to the ED with SOB that started 2 weeks ago at rest and worsens with exertion. Pt denies CP, N/V, and dizziness/weakness. Hx of COPD, on 1 L at home and sleeps w/ Bipap.

## 2022-07-22 NOTE — NURSING
Received report from Cuco Marquez from ED from [facility] . Pt AAOx4. Bruisisng noted to forearms. /69, HR 90's, O2 92% on 3L NC, R 23. Awaiting pt's arrival to the floor.

## 2022-07-22 NOTE — NURSING
Notified Dr. Devries that pt made it to the floor per secure chat. Awaiting his arrival to assess pt.

## 2022-07-22 NOTE — ED NOTES
Pt became more SOB on transfer to Intake bed.  02 sat 84% resp 30.  Charge nurse informed and pt moved to room 17

## 2022-07-22 NOTE — ED PROVIDER NOTES
Encounter Date: 7/22/2022       History     Chief Complaint   Patient presents with    Shortness of Breath     Called EMS for SOB, hx of COPD, normally on 1L (couldn't get a room air sat). 97% after tx and 3L     HPI     This is a 62-year-old man with a history of AFib, CHF, COPD on 1 L nasal cannula at home who presents with acute onset shortness of breath, was initially satting in the low 80s with EMS, improved after a DuoNeb to 97%.  Here, he was found to be satting 60% on 1 L nasal cannula, history is limited due to patient acuity.  He reports that over the past 3 weeks he has felt very short of breath, has a difficulty sleeping, has been using DuoNebs at home, feels like his BiPAP is not helping.  This morning he was not able to eat due to his dyspnea, so he called 911. No fevers.  Cough is nonproductive.    Review of patient's allergies indicates:   Allergen Reactions    Benazepril Swelling    Duoneb [ipratropium-albuterol]      Report minor Tremors, pt seems to be tolerating well.     Past Medical History:   Diagnosis Date    Asthma     Atrial fibrillation with rapid ventricular response     CHF (congestive heart failure)     COPD (chronic obstructive pulmonary disease)     home O2 at night only    Coronary artery disease     Hypertension     Lung nodule     Pneumonia     Seizures      Past Surgical History:   Procedure Laterality Date    ESOPHAGOGASTRODUODENOSCOPY N/A 2/11/2022    Procedure: EGD (ESOPHAGOGASTRODUODENOSCOPY);  Surgeon: Cain Ortega MD;  Location: [address]);  Service: Endoscopy;  Laterality: N/A;    LEFT HEART CATHETERIZATION  4/23/2020    Procedure: Left heart cath;  Surgeon: Nic Pollack MD;  Location: [facility] CATH LAB;  Service: Cardiology;;     Family History   Problem Relation Age of Onset    Cancer Father     Hypertension Sister     Stroke Sister     Stroke Brother     Diabetes Neg Hx     Heart disease Neg Hx      Social History     Tobacco Use     Smoking status: Current Some Day Smoker     Packs/day: 0.25     Types: Cigarettes    Smokeless tobacco: Never Used    Tobacco comment: 1-2 per day   Substance Use Topics    Alcohol use: Yes     Alcohol/week: 2.0 standard drinks     Types: 2 Cans of beer per week     Comment: every night    Drug use: No     Review of Systems   Constitutional: Negative for chills, diaphoresis, fatigue and fever.   HENT: Negative for congestion, rhinorrhea and sore throat.    Eyes: Negative for visual disturbance.   Respiratory: Positive for cough, shortness of breath and wheezing. Negative for chest tightness.    Cardiovascular: Negative for chest pain.   Gastrointestinal: Negative for abdominal pain, blood in stool, constipation, diarrhea and vomiting.   Genitourinary: Negative for dysuria, hematuria and urgency.   Musculoskeletal: Negative for back pain.   Skin: Negative for rash.   Neurological: Negative for seizures and syncope.   Hematological: Does not bruise/bleed easily.   Psychiatric/Behavioral: Negative for agitation and hallucinations.       Physical Exam     Initial Vitals [07/22/22 0842]   BP Pulse Resp Temp SpO2   (!) 160/100 82 20 98.2 °F (36.8 °C) 96 %      MAP       --         Physical Exam  Gen: AxOx3, in respiratory distress, using accessory muscles to breath, speaking in 2-3 word sentences, well nourished, appears stated age  Eye: EOMI, no scleral icterus, no periorbital edema or ecchymosis  Head: normocephalic, atraumatic, no lesions, scalp appears normal  ENT: neck supple, no stridor, no masses, no drooling or voice changes  CVS: RRR, no m/r/g, distal pulses intact/symmetric  Pulm: diminished air movement in all lung fields, with upper lung field wheeze, no rales or rhonchi, no increased work of breathing  Abd: soft, nontender, nondistended, no organomegaly, no CVAT  Ext: no edema, no lesions, rashes, or deformity  Neuro: GCS15, moving all extremities, gait intact, face grossly symmetric  Psych: normal  affect, cooperative, well groomed, makes good eye contact    ED Course   Procedures  Labs Reviewed   CBC W/ AUTO DIFFERENTIAL - Abnormal; Notable for the following components:       Result Value    Hemoglobin 13.5 (*)     MCV 74 (*)     MCH 23.2 (*)     MCHC 31.5 (*)     RDW 20.1 (*)     All other components within normal limits    Narrative:     Release to patient->Immediate   COMPREHENSIVE METABOLIC PANEL - Abnormal; Notable for the following components:    Sodium 129 (*)     Chloride 88 (*)     CO2 30 (*)     BUN 4 (*)     Albumin 3.2 (*)     ALT 7 (*)     All other components within normal limits   ISTAT PROCEDURE - Abnormal; Notable for the following components:    POC PH 7.302 (*)     POC PCO2 80.2 (*)     POC PO2 17 (*)     POC HCO3 39.6 (*)     POC SATURATED O2 19 (*)     POC TCO2 42 (*)     All other components within normal limits   TROPONIN I    Narrative:     Release to patient->Immediate   B-TYPE NATRIURETIC PEPTIDE    Narrative:     Release to patient->Immediate   SARS-COV-2 RNA AMPLIFICATION, QUAL   HIV 1 / 2 ANTIBODY   HEPATITIS C ANTIBODY        ECG Results          EKG 12-lead (Final result)  Result time 07/22/22 09:38:35    Final result by Interface, Lab In [facility] (07/22/22 09:38:35)                 Narrative:    Test Reason :     Vent. Rate : 100 BPM     Atrial Rate : 100 BPM     P-R Int : 196 ms          QRS Dur : 068 ms      QT Int : 344 ms       P-R-T Axes : 079 079 076 degrees     QTc Int : 443 ms    Normal sinus rhythm  Right atrial enlargement  Borderline Abnormal ECG  When compared with ECG of 16-MAR-2022 09:27,  Vent. rate has increased BY  36 BPM  Confirmed by Bryan DOBBINS MD ([id number]) on 7/22/2022 9:38:24 AM    Referred By: System System           Confirmed By:Bryan DOBBINS MD                            Imaging Results          X-Ray Chest AP Portable (Final result)  Result time 07/22/22 10:37:20    Final result by Jeff Chairez Jr., MD (07/22/22 10:37:20)                 Impression:       See above      Electronically signed by: Jeff Chairez MD  Date:    07/22/2022  Time:    10:37             Narrative:    EXAMINATION:  XR CHEST AP PORTABLE    CLINICAL HISTORY:  Asthma;    TECHNIQUE:  Single frontal view of the chest was performed.    COMPARISON:  March 13, 2022    FINDINGS:  Monitoring leads are in place.  Heart size pulmonary vessels are normal.  Lungs are slightly hyperexpanded.  No confluent consolidation.                                 Medications   albuterol-ipratropium 2.5 mg-0.5 mg/3 mL nebulizer solution 3 mL (has no administration in time range)   albuterol-ipratropium 2.5 mg-0.5 mg/3 mL nebulizer solution 3 mL (3 mLs Nebulization Given 7/22/22 0915)   methylPREDNISolone sodium succinate injection 125 mg (125 mg Intravenous Given 7/22/22 0925)   acetaminophen tablet 650 mg (650 mg Oral Given 7/22/22 1001)   albuterol sulfate nebulizer solution 15 mg (15 mg Nebulization Given 7/22/22 1014)     Medical Decision Making:   History:   Old Medical Records: I decided to obtain old medical records.  Old Records Summarized: records from clinic visits.  Initial Assessment:   This is a 62-year-old man who presents with acute onset shortness of breath, and I am concerned about acute exacerbation of COPD as etiology of his hypoxemic respiratory failure.  We placed him on a nebulizer treatment, and with increased oxygen and nebulizers, his sats came up to the 90s.  He is feeling better with improved work of breathing.  His VBG is notable for hypercapnia that is partially compensated.  I do not think he needs BiPAP at this time, though we will monitor him very closely.  Given he has had 3 weeks of symptoms, concerned about possible pneumonia, viral process, versus uncontrolled COPD exacerbation.  Plan for steroids, chest x-ray, labs and EKG.  Clinical Tests:   Lab Tests: Ordered and Reviewed  Radiological Study: Ordered and Reviewed  ED Management:  Labs are notable for a mostly compensated  respiratory acidosis.  After a stack of nebs, he is still not moving air significantly, though his work of breathing is improved, so I put him on an hour of continuous, which helped his air movement.  He has been weaned to 3 L nasal cannula.  I think he should come into the hospital for further neb treatments and monitoring of his respiratory status, and I discussed the patient with the accepting physician at the [facility] of our [facility], he has been accepted for transfer there.  Patient is agreeable to this.            Attending Attestation:         Attending Critical Care:   Critical Care Times:   Direct Patient Care (initial evaluation, reassessments, and time considering the case)................................................................15 minutes.   Ordering, Reviewing, and Interpreting Diagnostic Studies...............................................................................................................5 minutes.   Documentation..................................................................................................................................................................................5 minutes.   Consultation with other Physicians. .................................................................................................................................................5 minutes.   ==============================================================  · Total Critical Care Time - exclusive of procedural time: 30 minutes.  ==============================================================  Critical care was necessary to treat or prevent imminent or life-threatening deterioration of the following conditions: COPD exacerbation.   Critical care was time spent personally by me on the following activities: obtaining history from patient or relative, examination of patient, review of old charts, ordering lab, x-rays, and/or EKG, development of treatment plan with patient or  relative, ordering and performing treatments and interventions, interpretation of cardiac measurements, re-evaluation of patient's conition and evaluation of patient's response to treatment.   Critical Care Condition: life-threatening                    Clinical Impression:   Final diagnoses:  [R06.02] Shortness of breath  [J44.1] COPD exacerbation (Primary)  [J96.21] Acute on chronic respiratory failure with hypoxemia          ED Disposition Condition    Transfer to Another Facility Stable              Viktoriya Hui MD  07/22/22 8304

## 2022-07-22 NOTE — NURSING
Upon arrival to floor: cardiac monitor applied, patient oriented to room, call bell in reach and bed in lowest position.Patient arrived to floor in stable condition. Visualized patient and assessed patient's overall condition and appearance. No complaints. NAD noted. Will continue to monitor.

## 2022-07-22 NOTE — PLAN OF CARE
Problem: Adult Inpatient Plan of Care  Goal: Plan of Care Review  Outcome: Ongoing, Progressing     Problem: Adjustment to Illness COPD (Chronic Obstructive Pulmonary Disease)  Goal: Optimal Chronic Illness Coping  Outcome: Ongoing, Progressing     Problem: Respiratory Compromise COPD (Chronic Obstructive Pulmonary Disease)  Goal: Effective Oxygenation and Ventilation  Outcome: Ongoing, Progressing

## 2022-07-22 NOTE — PROVIDER TRANSFER
Outside Transfer Acceptance Note / Regional Referral Center    Referring facility: [facility]   Referring provider: GALILEA NOGUEIRA  Accepting facility: [facility]  Accepting provider: NARA RO  Admitting provider: DANIELLA MEDELLIN  Reason for transfer:  Capacity  Transfer diagnosis:  COPD exacerbation  Transfer specialty requested: Internal Medicine  Transfer specialty notified: yes  Transfer level: NUMBER 1-5: 2  Bed type requested: med-tele  Isolation status: No active isolations   Admission class or status: IP- Inpatient      Narrative     62-year-old male with a history of atrial fibrillation, CHF, COPD (1 L nasal cannula at home), home BiPAP use, and coronary artery disease presented to [facility] Emergency [facility] on July 22 with worsening dyspnea.  In the emergency department his initial oxygen saturations were between 60 and 70%.  He noted over the past 3 weeks he has been more short of breath with difficulty sleeping.  He felt like his BiPAP was not helping.  No fever chills, no chest pain, and no nausea or vomiting.  He was found to be hyponatremic in the emergency department.  In the emergency department he received acetaminophen, nebulizer treatments, and Solu-Medrol.  Case discussed with the referring provider and with Hospital Medicine at [facility]. Referring provider noted patient appeared stable.  Will plan transfer to Hospital Medicine at [facility] for further treatment of COPD exacerbation.    COVID vaccinated///COVID negative  Sodium 129 (137 on April 1, 2022), potassium 5, chloride 88, CO2 30, BUN 4, creatinine 0.5, glucose 71, AST 20, ALT 7, troponin 0.009, BNP 81, white blood cells 6.5, hemoglobin 13.5, hematocrit 42.9, platelets 437  Venous pH 7.302    Chest x-ray shows monitoring leads in place.  Normal heart size and pulmonary vessels.  Lungs are slightly hyperexpanded.  No confluent consolidation.    EKG showed sinus  tachycardia with ventricular rate 117, right atrial enlargement.    Objective     Vitals: Temp: 98.2 °F (36.8 °C) (07/22/22 0842)  Pulse: 102 (07/22/22 1156)  Resp: 20 (07/22/22 1156)  BP: 124/69 (07/22/22 1120)  SpO2: (!) 94 % (07/22/22 1156)  Recent Labs:   CBC:   Recent Labs   Lab 07/22/22  0937   WBC 6.50   HGB 13.5*   HCT 42.9        CMP:   Recent Labs   Lab 07/22/22  1028   *   K 5.0   CL 88*   CO2 30*   GLU 71   BUN 4*   CREATININE 0.8   CALCIUM 9.6   PROT 7.9   ALBUMIN 3.2*   BILITOT 0.5   ALKPHOS 70   AST 20   ALT 7*   ANIONGAP 11   EGFRNONAA >60.0     Recent imaging: see above     IV access:        Peripheral IV - Double Lumen 07/22/22 20 G Left Forearm (Active)   Site Assessment Clean;Dry;Intact 07/22/22 0927   Line 1 Status Blood return noted 07/22/22 0927   Dressing Status Clean;Dry;Intact 07/22/22 0927     Allergies:   Review of patient's allergies indicates:   Allergen Reactions    Benazepril Swelling    Duoneb [ipratropium-albuterol]      Report minor Tremors, pt seems to be tolerating well.      NPO: No    Anticoagulation:   Anticoagulants     None           Instructions    1. Admit to Hospital Medicine    Upon patient arrival to floor, please contact Hospital Medicine on call.     ALONZO Herron MD  Hospital Medicine Staff  Cell: [phone]

## 2022-07-22 NOTE — PLAN OF CARE
"SW met with pt as part of ED U-Turn program for high utilizers. Pt states he had been doing well until about 3 weeks ago when he began feeling short of breath again. He thinks his BIPAP is not working correctly. Pt states "I tried to hold off as long as I could to not come here". Pt states that he did not contact [facility] about the machine b/c he didn't know who his supplier was. Pt still lives alone but has support of his 3 sisters and his brother. Pt reports that his Care at Home visits stopped some time ago, but he is agreeable to resuming.     SW contacted [facility] regarding pt's BIPAP- Unavailable at this time. Will return call.     Pt to be admitted and transferred to [facility].     Lizette Flores, [facility]  ED   Care Management  [facility]  Ext. [phone]      [facility] - Emergency Dept  Initial Discharge Assessment       Primary Care Provider: [facility]    Admission Diagnosis: No admission diagnoses are documented for this encounter.    Admission Date: 7/22/2022  Expected Discharge Date:     Discharge Barriers Identified: Does not adhere to care plan    Payor: MEDICAID / Plan: MEDICA[place] OF [place] / Product Type: Government /     Extended Emergency Contact Information  Primary Emergency Contact: Gladis Mccray   [place]  Mobile Phone: [phone]  Relation: Sister  Secondary Emergency Contact: Viry Mccray   [place]  Mobile Phone: [phone]  Relation: Sister    Discharge Plan A: Home, Community Services  Discharge Plan B: Home, Home with family      [facility]  [address]  [place] [address]  Phone: [phone] Fax: [phone]    [id number] - [place] - [address] AT [facility] [address]  [facility] [address]  Phone: [phone] Fax: [phone]      Initial Assessment (most recent)     Adult Discharge Assessment - 07/22/22 1050        " Discharge Assessment    Assessment Type Discharge Planning Assessment     Confirmed/corrected address, phone number and insurance Yes     Confirmed Demographics Correct on Facesheet     Source of Information patient;health record     When was your last doctors appointment? 07/07/22   Pulmonology    Does patient/caregiver understand observation status Yes     Communicated RUTH with patient/caregiver Date not available/Unable to determine     Reason For Admission SOB     Lives With alone     Do you expect to return to your current living situation? Yes     Do you have help at home or someone to help you manage your care at home? Yes     Who are your caregiver(s) and their phone number(s)? Sisters and brother     Prior to hospitilization cognitive status: No Deficits;Alert/Oriented     Current cognitive status: Alert/Oriented;No Deficits     Walking or Climbing Stairs Difficulty none     Dressing/Bathing Difficulty none     Home Accessibility wheelchair accessible     Home Layout Able to live on 1st floor     Equipment Currently Used at Home BIPAP;oxygen;respiratory supplies   [facility] supplies    Readmission within 30 days? No     Patient currently being followed by outpatient case management? No     Do you currently have service(s) that help you manage your care at home? No     Do you take prescription medications? Yes     Do you have prescription coverage? Yes     Coverage Medicaid     Do you have any problems affording any of your prescribed medications? No     Is the patient taking medications as prescribed? yes     Who is going to help you get home at discharge? Family     How do you get to doctors appointments? family or friend will provide     Are you on dialysis? No     Do you take coumadin? No     Discharge Plan A Home;Community Services     Discharge Plan B Home;Home with family     DME Needed Upon Discharge  other (see comments)   Need [facility] to service his bipap    Discharge Plan discussed with: Patient      Discharge Barriers Identified Does not adhere to care plan

## 2022-07-23 LAB
AMPHET+METHAMPHET UR QL: NEGATIVE
ANION GAP SERPL CALC-SCNC: 5 MMOL/L (ref 8–16)
BARBITURATES UR QL SCN>200 NG/ML: NEGATIVE
BASOPHILS # BLD AUTO: 0 K/UL (ref 0–0.2)
BASOPHILS NFR BLD: 0 % (ref 0–1.9)
BENZODIAZ UR QL SCN>200 NG/ML: NEGATIVE
BUN SERPL-MCNC: 10 MG/DL (ref 6–20)
BZE UR QL SCN: NEGATIVE
CALCIUM SERPL-MCNC: 9.4 MG/DL (ref 8.7–10.5)
CANNABINOIDS UR QL SCN: NEGATIVE
CHLORIDE SERPL-SCNC: 97 MMOL/L (ref 95–110)
CO2 SERPL-SCNC: 30 MMOL/L (ref 23–29)
CREAT SERPL-MCNC: 0.7 MG/DL (ref 0.5–1.4)
CREAT UR-MCNC: 35.6 MG/DL (ref 23–375)
DIFFERENTIAL METHOD: ABNORMAL
EOSINOPHIL # BLD AUTO: 0 K/UL (ref 0–0.5)
EOSINOPHIL NFR BLD: 0 % (ref 0–8)
ERYTHROCYTE [DISTWIDTH] IN BLOOD BY AUTOMATED COUNT: 19 % (ref 11.5–14.5)
EST. GFR  (AFRICAN AMERICAN): >60 ML/MIN/1.73 M^2
EST. GFR  (NON AFRICAN AMERICAN): >60 ML/MIN/1.73 M^2
FOLATE SERPL-MCNC: 6.1 NG/ML (ref 4–24)
GLUCOSE SERPL-MCNC: 115 MG/DL (ref 70–110)
HCT VFR BLD AUTO: 39 % (ref 40–54)
HGB BLD-MCNC: 12.1 G/DL (ref 14–18)
IMM GRANULOCYTES # BLD AUTO: 0.02 K/UL (ref 0–0.04)
IMM GRANULOCYTES NFR BLD AUTO: 0.6 % (ref 0–0.5)
LYMPHOCYTES # BLD AUTO: 0.5 K/UL (ref 1–4.8)
LYMPHOCYTES NFR BLD: 15.2 % (ref 18–48)
MAGNESIUM SERPL-MCNC: 1.8 MG/DL (ref 1.6–2.6)
MCH RBC QN AUTO: 22.7 PG (ref 27–31)
MCHC RBC AUTO-ENTMCNC: 31 G/DL (ref 32–36)
MCV RBC AUTO: 73 FL (ref 82–98)
METHADONE UR QL SCN>300 NG/ML: NEGATIVE
MONOCYTES # BLD AUTO: 0.1 K/UL (ref 0.3–1)
MONOCYTES NFR BLD: 1.4 % (ref 4–15)
NEUTROPHILS # BLD AUTO: 2.9 K/UL (ref 1.8–7.7)
NEUTROPHILS NFR BLD: 82.8 % (ref 38–73)
NRBC BLD-RTO: 0 /100 WBC
OPIATES UR QL SCN: NEGATIVE
PCP UR QL SCN>25 NG/ML: NEGATIVE
PHOSPHATE SERPL-MCNC: 3.6 MG/DL (ref 2.7–4.5)
PLATELET # BLD AUTO: 382 K/UL (ref 150–450)
PMV BLD AUTO: 9.6 FL (ref 9.2–12.9)
POTASSIUM SERPL-SCNC: 5.1 MMOL/L (ref 3.5–5.1)
RBC # BLD AUTO: 5.33 M/UL (ref 4.6–6.2)
SODIUM SERPL-SCNC: 132 MMOL/L (ref 136–145)
TOXICOLOGY INFORMATION: NORMAL
VIT B12 SERPL-MCNC: 884 PG/ML (ref 210–950)
WBC # BLD AUTO: 3.55 K/UL (ref 3.9–12.7)

## 2022-07-23 PROCEDURE — 80307 DRUG TEST PRSMV CHEM ANLYZR: CPT | Performed by: HOSPITALIST

## 2022-07-23 PROCEDURE — 94761 N-INVAS EAR/PLS OXIMETRY MLT: CPT

## 2022-07-23 PROCEDURE — 94640 AIRWAY INHALATION TREATMENT: CPT

## 2022-07-23 PROCEDURE — 25000242 PHARM REV CODE 250 ALT 637 W/ HCPCS: Performed by: INTERNAL MEDICINE

## 2022-07-23 PROCEDURE — 80048 BASIC METABOLIC PNL TOTAL CA: CPT | Performed by: INTERNAL MEDICINE

## 2022-07-23 PROCEDURE — 83735 ASSAY OF MAGNESIUM: CPT | Performed by: INTERNAL MEDICINE

## 2022-07-23 PROCEDURE — 99223 1ST HOSP IP/OBS HIGH 75: CPT | Mod: ,,, | Performed by: INTERNAL MEDICINE

## 2022-07-23 PROCEDURE — 27000221 HC OXYGEN, UP TO 24 HOURS

## 2022-07-23 PROCEDURE — 94760 N-INVAS EAR/PLS OXIMETRY 1: CPT

## 2022-07-23 PROCEDURE — 25000003 PHARM REV CODE 250: Performed by: HOSPITALIST

## 2022-07-23 PROCEDURE — 36415 COLL VENOUS BLD VENIPUNCTURE: CPT | Performed by: INTERNAL MEDICINE

## 2022-07-23 PROCEDURE — 85025 COMPLETE CBC W/AUTO DIFF WBC: CPT | Performed by: INTERNAL MEDICINE

## 2022-07-23 PROCEDURE — 25000003 PHARM REV CODE 250: Performed by: INTERNAL MEDICINE

## 2022-07-23 PROCEDURE — S4991 NICOTINE PATCH NONLEGEND: HCPCS | Performed by: HOSPITALIST

## 2022-07-23 PROCEDURE — 84100 ASSAY OF PHOSPHORUS: CPT | Performed by: INTERNAL MEDICINE

## 2022-07-23 PROCEDURE — 63600175 PHARM REV CODE 636 W HCPCS: Performed by: INTERNAL MEDICINE

## 2022-07-23 PROCEDURE — 99900035 HC TECH TIME PER 15 MIN (STAT)

## 2022-07-23 PROCEDURE — 21400001 HC TELEMETRY ROOM

## 2022-07-23 PROCEDURE — 99223 PR INITIAL HOSPITAL CARE,LEVL III: ICD-10-PCS | Mod: ,,, | Performed by: INTERNAL MEDICINE

## 2022-07-23 PROCEDURE — 94660 CPAP INITIATION&MGMT: CPT

## 2022-07-23 PROCEDURE — 94799 UNLISTED PULMONARY SVC/PX: CPT

## 2022-07-23 RX ORDER — IBUPROFEN 600 MG/1
600 TABLET ORAL EVERY 6 HOURS PRN
Status: DISCONTINUED | OUTPATIENT
Start: 2022-07-23 | End: 2022-07-24 | Stop reason: HOSPADM

## 2022-07-23 RX ORDER — LANOLIN ALCOHOL/MO/W.PET/CERES
1 CREAM (GRAM) TOPICAL DAILY
Status: DISCONTINUED | OUTPATIENT
Start: 2022-07-23 | End: 2022-07-24 | Stop reason: HOSPADM

## 2022-07-23 RX ORDER — NICOTINE 7MG/24HR
1 PATCH, TRANSDERMAL 24 HOURS TRANSDERMAL DAILY
Status: DISCONTINUED | OUTPATIENT
Start: 2022-07-23 | End: 2022-07-24 | Stop reason: HOSPADM

## 2022-07-23 RX ORDER — HYDRALAZINE HYDROCHLORIDE 20 MG/ML
15 INJECTION INTRAMUSCULAR; INTRAVENOUS EVERY 4 HOURS PRN
Status: DISCONTINUED | OUTPATIENT
Start: 2022-07-23 | End: 2022-07-24 | Stop reason: HOSPADM

## 2022-07-23 RX ORDER — CALCIUM CARBONATE 200(500)MG
500 TABLET,CHEWABLE ORAL DAILY PRN
Status: DISCONTINUED | OUTPATIENT
Start: 2022-07-23 | End: 2022-07-24 | Stop reason: HOSPADM

## 2022-07-23 RX ADMIN — TIZANIDINE 4 MG: 4 TABLET ORAL at 05:07

## 2022-07-23 RX ADMIN — GUAIFENESIN AND DEXTROMETHORPHAN 10 ML: 100; 10 SYRUP ORAL at 05:07

## 2022-07-23 RX ADMIN — ACETAMINOPHEN 650 MG: 325 TABLET ORAL at 05:07

## 2022-07-23 RX ADMIN — FLUTICASONE FUROATE AND VILANTEROL TRIFENATATE 1 PUFF: 100; 25 POWDER RESPIRATORY (INHALATION) at 08:07

## 2022-07-23 RX ADMIN — NICOTINE 1 PATCH: 7 PATCH, EXTENDED RELEASE TRANSDERMAL at 10:07

## 2022-07-23 RX ADMIN — ENOXAPARIN SODIUM 40 MG: 100 INJECTION SUBCUTANEOUS at 04:07

## 2022-07-23 RX ADMIN — IPRATROPIUM BROMIDE AND ALBUTEROL SULFATE 3 ML: 2.5; .5 SOLUTION RESPIRATORY (INHALATION) at 07:07

## 2022-07-23 RX ADMIN — ROFLUMILAST 500 MCG: 500 TABLET ORAL at 08:07

## 2022-07-23 RX ADMIN — IPRATROPIUM BROMIDE AND ALBUTEROL SULFATE 3 ML: 2.5; .5 SOLUTION RESPIRATORY (INHALATION) at 01:07

## 2022-07-23 RX ADMIN — PANTOPRAZOLE SODIUM 40 MG: 40 TABLET, DELAYED RELEASE ORAL at 08:07

## 2022-07-23 RX ADMIN — IBUPROFEN 600 MG: 600 TABLET ORAL at 09:07

## 2022-07-23 RX ADMIN — PREDNISONE 40 MG: 20 TABLET ORAL at 08:07

## 2022-07-23 RX ADMIN — DOXYCYCLINE HYCLATE 100 MG: 100 TABLET, COATED ORAL at 08:07

## 2022-07-23 RX ADMIN — CARVEDILOL 12.5 MG: 12.5 TABLET, FILM COATED ORAL at 05:07

## 2022-07-23 RX ADMIN — GUAIFENESIN AND DEXTROMETHORPHAN 10 ML: 100; 10 SYRUP ORAL at 12:07

## 2022-07-23 RX ADMIN — CALCIUM CARBONATE (ANTACID) CHEW TAB 500 MG 500 MG: 500 CHEW TAB at 06:07

## 2022-07-23 RX ADMIN — GUAIFENESIN AND DEXTROMETHORPHAN 10 ML: 100; 10 SYRUP ORAL at 11:07

## 2022-07-23 RX ADMIN — CARVEDILOL 12.5 MG: 12.5 TABLET, FILM COATED ORAL at 08:07

## 2022-07-23 RX ADMIN — SENNOSIDES AND DOCUSATE SODIUM 1 TABLET: 50; 8.6 TABLET ORAL at 08:07

## 2022-07-23 RX ADMIN — IPRATROPIUM BROMIDE AND ALBUTEROL SULFATE 3 ML: 2.5; .5 SOLUTION RESPIRATORY (INHALATION) at 08:07

## 2022-07-23 RX ADMIN — SODIUM CHLORIDE 500 ML: 0.9 INJECTION, SOLUTION INTRAVENOUS at 10:07

## 2022-07-23 RX ADMIN — TIZANIDINE 4 MG: 4 TABLET ORAL at 08:07

## 2022-07-23 RX ADMIN — FERROUS SULFATE TAB 325 MG (65 MG ELEMENTAL FE) 1 EACH: 325 (65 FE) TAB at 10:07

## 2022-07-23 NOTE — ASSESSMENT & PLAN NOTE
His last TTE was 1/29/22 and showed EF 55%, grade I diastolic dysfunction, PAP 50, and normal RV function.   He appears more dry than overloaded  BNP is normal    Consider adding ACE at some point        carvediloL tablet 12.5 mg, 12.5 mg, Oral, BID WM      [START ON 7/23/2022] furosemide tablet 20 mg, 20 mg, Oral, Daily

## 2022-07-23 NOTE — ASSESSMENT & PLAN NOTE
His home BiPAP settings HS have been ordered  He has no clinical signs of respiratory hypercapnia

## 2022-07-23 NOTE — ASSESSMENT & PLAN NOTE
The best pathway COPD order sets have been initiated  The patient states he is already feeling better now    Current Facility-Administered Medications:     albuterol-ipratropium 2.5 mg-0.5 mg/3 mL nebulizer solution 3 mL, 3 mL, Nebulization, Q4H PRN and q6 scheduled    [START ON 7/23/2022] fluticasone furoate-vilanteroL 100-25 mcg/dose diskus inhaler 1 puff, 1 puff, Inhalation, Daily    PpredniSONE tablet 40 mg, 40 mg, Oral, Daily, now    [START ON 7/23/2022] roflumilast (DALIRESP) tablet 500 mcg, 500 mcg, Oral, Daily, Davis Devries MD

## 2022-07-23 NOTE — PROGRESS NOTES
"[facility]  Progress Note    Patient Name: Baltazar Mccray  MRN: [id number]  Patient Class: IP- Inpatient   Admission Date: 7/22/2022  Length of Stay: 1 days  Attending Physician: Naeem Mccloud MD  Primary Care Provider: [facility]        Subjective:     Principal Problem:Acute on chronic respiratory failure with hypoxia and hypercapnia        HPI:  Mr. Mccray is a 61yo man with Afib, CHF, CAD and COPD.  His last TTE was 1/29/22 and showed EF 55%, grade I diastolic dysfunction, PAP 50, and normal RV function.     He now comes as a transfer from [facility] for capacity issues.  He tells me he has very frequent COPD exacerbations, but is usually able to control them at home on his BiPAP and with nebs.  Over the past 4 days he has noticed significant worsening of his SOB, especially at night.  He is having to wake up and cough, and can't get comfortable.  His cough is mostly dry, but with occasional green sputum.  He denies fever or chills.  He has no PND or leg swelling, and he states, "this doesn't feel like my heart."  He denies CP, N/V.  He does report some wheezing.    As noted by our , Dr. Herron:  62-year-old male with a history of atrial fibrillation, CHF, COPD (1 L nasal cannula at home), home BiPAP use, and coronary artery disease presented to [facility] Emergency Department on July 22 with worsening dyspnea.  In the emergency department his initial oxygen saturations were between 60 and 70%.  He noted over the past 3 weeks he has been more short of breath with difficulty sleeping.  He felt like his BiPAP was not helping.  No fever chills, no chest pain, and no nausea or vomiting.  He was found to be hyponatremic in the emergency department.  In the emergency department he received acetaminophen, nebulizer treatments, and Solu-Medrol.  Case discussed with the referring provider and with Hospital Medicine at [facility]. Referring provider noted " "patient appeared stable.  Will plan transfer to Hospital Medicine at [facility] for further treatment of COPD exacerbation.     COVID vaccinated///COVID negative  Sodium 129 (137 on April 1, 2022), potassium 5, chloride 88, CO2 30, BUN 4, creatinine 0.5, glucose 71, AST 20, ALT 7, troponin 0.009, BNP 81, white blood cells 6.5, hemoglobin 13.5, hematocrit 42.9, platelets 437.  Venous pH 7.302.  Chest x-ray shows monitoring leads in place.  Normal heart size and pulmonary vessels.  Lungs are slightly hyperexpanded.  No confluent consolidation.  EKG showed sinus tachycardia with ventricular rate 117, right atrial enlargement.      Overview/Hospital Course:  No notes on file    Interval History: No acute events overnight.  This is his 7th hospitalization since 12/2021.  States he is compliant and smokes only 4-5 cigarettes a week.  Feeling better - about "50% of normal".  Denies chest pain.  Asks that his O2 be turned down to his home 1LPM.  All questions answered and patient had no further complaints.    Review of Systems   Constitutional:  Positive for activity change. Negative for chills and fever.   HENT:  Positive for congestion.    Eyes:  Negative for photophobia.   Respiratory:  Positive for cough, shortness of breath and wheezing. Negative for chest tightness.    Cardiovascular:  Negative for chest pain and palpitations.   Gastrointestinal:  Negative for diarrhea, nausea and vomiting.   Endocrine: Positive for cold intolerance.   Genitourinary:  Negative for dysuria.   Musculoskeletal:  Negative for myalgias.   Skin:  Negative for rash.   Allergic/Immunologic: Negative for immunocompromised state.   Neurological:  Negative for light-headedness.   Hematological:  Does not bruise/bleed easily.   Psychiatric/Behavioral:  Negative for decreased concentration.    Objective:     Vital Signs (Most Recent):  Temp: 97.7 °F (36.5 °C) (07/23/22 1554)  Pulse: 70 (07/23/22 1554)  Resp: 18 (07/23/22 1554)  BP: (!) " 150/78 (07/23/22 1554)  SpO2: 96 % (07/23/22 1554)   Vital Signs (24h Range):  Temp:  [97.6 °F (36.4 °C)-98.2 °F (36.8 °C)] 97.7 °F (36.5 °C)  Pulse:  [61-96] 70  Resp:  [16-19] 18  SpO2:  [90 %-99 %] 96 %  BP: (109-158)/(63-82) 150/78     Weight: 58.6 kg (129 lb 3 oz)  Body mass index is 20.23 kg/m².    Intake/Output Summary (Last 24 hours) at 7/23/2022 1613  Last data filed at 7/23/2022 0840  Gross per 24 hour   Intake 1239.13 ml   Output 1100 ml   Net 139.13 ml      Physical Exam  Vitals and nursing note reviewed.   Constitutional:       General: He is not in acute distress.     Appearance: Normal appearance. He is not ill-appearing, toxic-appearing or diaphoretic.   HENT:      Head: Normocephalic and atraumatic.      Right Ear: External ear normal.      Left Ear: External ear normal.      Nose: Nose normal.      Mouth/Throat:      Mouth: Mucous membranes are moist.      Dentition: Abnormal dentition. Dental caries present.   Eyes:      Extraocular Movements: Extraocular movements intact.      Conjunctiva/sclera: Conjunctivae normal.      Right eye: Right conjunctiva is not injected.      Left eye: Left conjunctiva is not injected.      Comments: ?Mild exophthalmos   Neck:      Thyroid: No thyroid mass or thyromegaly.   Cardiovascular:      Rate and Rhythm: Normal rate and regular rhythm.      Heart sounds:   No systolic murmur is present.   Pulmonary:      Effort: Pulmonary effort is normal.      Breath sounds: Decreased breath sounds present. No rhonchi.      Comments: Mild diffuse wheezes, breathing comfortably, speaking in full sentences  Chest:      Chest wall: No swelling or tenderness.   Abdominal:      General: Abdomen is flat. Bowel sounds are normal.      Palpations: Abdomen is soft.      Tenderness: There is no abdominal tenderness.   Genitourinary:     Comments: No pulliam in place  Musculoskeletal:      Cervical back: Full passive range of motion without pain and normal range of motion. No rigidity.    Lymphadenopathy:      Comments: No peripheral edema   Skin:     General: Skin is cool and dry.   Neurological:      Mental Status: He is alert and oriented to person, place, and time. Mental status is at baseline.   Psychiatric:         Attention and Perception: Attention and perception normal.         Mood and Affect: Mood normal.         Behavior: Behavior is cooperative.         Cognition and Memory: Cognition and memory normal.       Significant Labs: All pertinent labs within the past 24 hours have been reviewed.    Significant Imaging: I have reviewed all pertinent imaging results/findings within the past 24 hours.      Assessment/Plan:      * Acute on chronic respiratory failure with hypoxia and hypercapnia  -Admitted to inpatient status  -Noted tachypnea, increased work of breathing and respiratory distress with wheezing on admit  -At home he is on 1L NC O2 and bipap qhs.  -CXR showed lungs are slightly hyperexpanded without confluent consolidation.  -On admit ABG showed a respiratory acidosis with pCO2 of 80.2  -This is due to COPD exacerbation  -This is his 7th hospitalization since 12/2021  -Treat with prednisone, scheduled nebulizers, roflumilast, fluticasone/vilanterol, doxycycline, supplemental o2 and bipap qhs  -Clinically improving.  Possible discharge tomorrow.    COPD exacerbation  -Treatment as above.    Acute bronchitis  -Treatment as above.    BiPAP (biphasic positive airway pressure) dependence  -His home BiPAP settings HS have been ordered  -He has no clinical signs of respiratory hypercapnia despite pCO2 of 80    Hyponatremia  -Na 129 on admit  -Appeared hypovolemic  -Given NS 500cc with improvement  -Repeat bmp in AM    Microcytic anemia  -Hb 13.5 and now 12.1  -No evidence of bleeding  -He is iron deficient - Tsat 6%  -Await b12 and folate  -Start FeSo4  -Repeat cbc in AM.    Chronic diastolic congestive heart failure  -Echo 1/29/22 showed EF 55%, grade I diastolic dysfunction, PAP 50,  and normal RV function.   -BNP normal on admit and appeared hypovolemic  -Strict ins/outs and daily weights  -Holding home lasix 20mg qd and got 500cc NS on admit  -Continue coreg  -Consider acei at some point    Atrial fibrillation  -Presently in NSR  -Continue home coreg  -[id number] Score: 1. Anticoagulation not indicated.    Essential hypertension  -BP moderately elevated at times  -Continue home Coreg  -Order PRN hydralazine    Alcohol use disorder, mild, abuse  -History noted  -No signs of EtOh withdrawal  -Stable currently to monitor    Tobacco abuse  -Counselled on cessation 8 minutes.  -NRT ordered.      VTE Risk Mitigation (From admission, onward)         Ordered     enoxaparin injection 40 mg  Daily         07/22/22 1659                Discharge Planning   RUTH:      Code Status: Full Code   Is the patient medically ready for discharge?:     Reason for patient still in hospital (select all that apply): Treatment  Discharge Plan A: Home, Community Services                  Naeem Mccloud MD  Department of Hospital Medicine   [facility] - Telemetry

## 2022-07-23 NOTE — SUBJECTIVE & OBJECTIVE
Past Medical History:   Diagnosis Date    Asthma     Atrial fibrillation with rapid ventricular response     CHF (congestive heart failure)     COPD (chronic obstructive pulmonary disease)     home O2 at night only    Coronary artery disease     Hypertension     Lung nodule     Pneumonia     Seizures        Past Surgical History:   Procedure Laterality Date    ESOPHAGOGASTRODUODENOSCOPY N/A 2/11/2022    Procedure: EGD (ESOPHAGOGASTRODUODENOSCOPY);  Surgeon: Cain Ortega MD;  Location: [facility] ENDO ([address] [place] [address]);  Service: Endoscopy;  Laterality: N/A;    LEFT HEART CATHETERIZATION  4/23/2020    Procedure: Left heart cath;  Surgeon: Nic Pollack MD;  Location: [facility] CATH LAB;  Service: Cardiology;;       Review of patient's allergies indicates:   Allergen Reactions    Benazepril Swelling    Duoneb [ipratropium-albuterol]      Report minor Tremors, pt seems to be tolerating well.       No current facility-administered medications on file prior to encounter.     Current Outpatient Medications on File Prior to Encounter   Medication Sig    acetaminophen (TYLENOL) 325 MG tablet Take 2 tablets (650 mg total) by mouth every 8 (eight) hours as needed for Pain or Temperature greater than (100.5).    albuterol (PROVENTIL/VENTOLIN HFA) 90 mcg/actuation inhaler Inhale 1-2 puffs into the lungs every 6 (six) hours as needed for Wheezing. Rescue    albuterol-ipratropium (DUO-NEB) 2.5 mg-0.5 mg/3 mL nebulizer solution Take 3 mLs by nebulization every 4 (four) hours as needed for Wheezing or Shortness of Breath. Rescue    budesonide-glycopyr-formoterol (BREZTRI AEROSPHERE) 160-9-4.8 mcg/actuation HFAA Inhale 2 puffs into the lungs 2 (two) times daily. Rinse mouth after use. CONTROL INHALER    carvediloL (COREG) 12.5 MG tablet Take 1 tablet (12.5 mg total) by mouth 2 (two) times daily with meals.    furosemide (LASIX) 20 MG tablet Take 1 tablet (20 mg total) by mouth once daily.    pantoprazole (PROTONIX) 40 MG tablet Take 1 tablet  (40 mg total) by mouth 2 (two) times daily.    roflumilast (DALIRESP) 500 mcg Tab Take 1 tablet (500 mcg total) by mouth once daily.    roflumilast 250 mcg Tab Take 1 tablet (250 mcg total) by mouth once daily at 6am. Take 250mg daily for 1 month followed by 500mg daily    [DISCONTINUED] tiotropium bromide (SPIRIVA RESPIMAT) 2.5 mcg/actuation inhaler Inhale 2 puffs into the lungs Daily. Controller     Family History       Problem Relation (Age of Onset)    Cancer Father    Hypertension Sister    Stroke Sister, Brother          Tobacco Use    Smoking status: Current Some Day Smoker     Packs/day: 0.25     Types: Cigarettes    Smokeless tobacco: Never Used    Tobacco comment: 1-2 per day   Substance and Sexual Activity    Alcohol use: Yes     Alcohol/week: 2.0 standard drinks     Types: 2 Cans of beer per week     Comment: every night    Drug use: No    Sexual activity: Not Currently     Review of Systems   Constitutional:  Positive for activity change. Negative for chills and fever.   HENT:  Positive for congestion.    Eyes:  Negative for photophobia.   Respiratory:  Positive for cough, chest tightness, shortness of breath and wheezing.    Cardiovascular:  Negative for chest pain and palpitations.   Gastrointestinal:  Negative for diarrhea, nausea and vomiting.   Endocrine: Positive for cold intolerance.   Genitourinary:  Negative for dysuria.   Musculoskeletal:  Negative for myalgias.   Skin:  Negative for rash.   Allergic/Immunologic: Negative for immunocompromised state.   Neurological:  Negative for light-headedness.   Hematological:  Does not bruise/bleed easily.   Psychiatric/Behavioral:  Negative for decreased concentration.    Objective:     Vital Signs (Most Recent):  Temp: 97.9 °F (36.6 °C) (07/22/22 1538)  Pulse: 95 (07/22/22 1719)  Resp: 18 (07/22/22 1719)  BP: (!) 149/69 (07/22/22 1538)  SpO2: 99 % (07/22/22 1719)   Vital Signs (24h Range):  Temp:  [97.9 °F (36.6 °C)-98.2 °F (36.8 °C)] 97.9 °F (36.6  °C)  Pulse:  [] 95  Resp:  [18-31] 18  SpO2:  [71 %-100 %] 99 %  BP: (124-160)/() 149/69     Weight: 58.6 kg (129 lb 3 oz)  Body mass index is 20.23 kg/m².    Physical Exam  Constitutional:       General: He is not in acute distress.     Appearance: Normal appearance. He is not ill-appearing, toxic-appearing or diaphoretic.   HENT:      Head: Normocephalic and atraumatic.      Mouth/Throat:      Dentition: Abnormal dentition. Dental caries present.   Eyes:      Conjunctiva/sclera:      Right eye: Right conjunctiva is not injected.      Left eye: Left conjunctiva is not injected.      Comments: ?Mild exophthalmos   Neck:      Thyroid: No thyroid mass or thyromegaly.   Cardiovascular:      Rate and Rhythm: Normal rate and regular rhythm.      Heart sounds:   No systolic murmur is present.   Pulmonary:      Effort: Pulmonary effort is normal.      Breath sounds: Decreased breath sounds and rales present. No wheezing or rhonchi.   Chest:      Chest wall: No swelling or tenderness.   Abdominal:      General: Abdomen is flat. Bowel sounds are normal.      Palpations: Abdomen is soft.      Tenderness: There is no abdominal tenderness.   Genitourinary:     Comments: No pulliam in place  Musculoskeletal:      Cervical back: Full passive range of motion without pain.   Lymphadenopathy:      Comments: No peripheral edema   Skin:     General: Skin is cool and dry.   Neurological:      Mental Status: He is alert.   Psychiatric:         Attention and Perception: Attention and perception normal.         Mood and Affect: Mood normal.         Behavior: Behavior is cooperative.         Cognition and Memory: Cognition and memory normal.           Significant Labs: All pertinent labs within the past 24 hours have been reviewed.  Recent Results (from the past 24 hour(s))   ISTAT PROCEDURE    Collection Time: 07/22/22  9:05 AM   Result Value Ref Range    POC PH 7.302 (L) 7.35 - 7.45    POC PCO2 80.2 (H) 35 - 45 mmHg    POC PO2  17 (L) 40 - 60 mmHg    POC HCO3 39.6 (H) 24 - 28 mmol/L    POC BE 13 -2 to 2 mmol/L    POC SATURATED O2 19 (L) 95 - 100 %    POC TCO2 42 (H) 24 - 29 mmol/L    Sample VENOUS     Site Other     Allens Test N/A    COVID-19 Rapid Screening    Collection Time: 07/22/22  9:35 AM   Result Value Ref Range    SARS-CoV-2 RNA, Amplification, Qual Negative Negative   CBC auto differential    Collection Time: 07/22/22  9:37 AM   Result Value Ref Range    WBC 6.50 3.90 - 12.70 K/uL    RBC 5.81 4.60 - 6.20 M/uL    Hemoglobin 13.5 (L) 14.0 - 18.0 g/dL    Hematocrit 42.9 40.0 - 54.0 %    MCV 74 (L) 82 - 98 fL    MCH 23.2 (L) 27.0 - 31.0 pg    MCHC 31.5 (L) 32.0 - 36.0 g/dL    RDW 20.1 (H) 11.5 - 14.5 %    Platelets 437 150 - 450 K/uL    MPV 9.9 9.2 - 12.9 fL    Immature Granulocytes 0.3 0.0 - 0.5 %    Gran # (ANC) 2.8 1.8 - 7.7 K/uL    Immature Grans (Abs) 0.02 0.00 - 0.04 K/uL    Lymph # 2.6 1.0 - 4.8 K/uL    Mono # 0.6 0.3 - 1.0 K/uL    Eos # 0.5 0.0 - 0.5 K/uL    Baso # 0.10 0.00 - 0.20 K/uL    nRBC 0 0 /100 WBC    Gran % 43.0 38.0 - 73.0 %    Lymph % 39.2 18.0 - 48.0 %    Mono % 8.8 4.0 - 15.0 %    Eosinophil % 7.2 0.0 - 8.0 %    Basophil % 1.5 0.0 - 1.9 %    Differential Method Automated    Troponin I    Collection Time: 07/22/22  9:37 AM   Result Value Ref Range    Troponin I 0.009 0.000 - 0.026 ng/mL   Brain natriuretic peptide    Collection Time: 07/22/22  9:37 AM   Result Value Ref Range    BNP 81 0 - 99 pg/mL   HIV 1/2 Ag/Ab (4th Gen)    Collection Time: 07/22/22 10:10 AM   Result Value Ref Range    HIV 1/2 Ag/Ab Negative Negative   Comprehensive metabolic panel    Collection Time: 07/22/22 10:28 AM   Result Value Ref Range    Sodium 129 (L) 136 - 145 mmol/L    Potassium 5.0 3.5 - 5.1 mmol/L    Chloride 88 (L) 95 - 110 mmol/L    CO2 30 (H) 23 - 29 mmol/L    Glucose 71 70 - 110 mg/dL    BUN 4 (L) 6 - 20 mg/dL    Creatinine 0.8 0.5 - 1.4 mg/dL    Calcium 9.6 8.7 - 10.5 mg/dL    Total Protein 7.9 6.0 - 8.4 g/dL    Albumin 3.2  (L) 3.5 - 5.2 g/dL    Total Bilirubin 0.5 0.1 - 1.0 mg/dL    Alkaline Phosphatase 70 55 - 135 U/L    AST 20 10 - 40 U/L    ALT 7 (L) 10 - 44 U/L    Anion Gap 11 8 - 16 mmol/L    eGFR if African American >60.0 >60 mL/min/1.73 m^2    eGFR if non African American >60.0 >60 mL/min/1.73 m^2   ISTAT PROCEDURE    Collection Time: 07/22/22  5:21 PM   Result Value Ref Range    POC PH 7.323 (L) 7.35 - 7.45    POC PCO2 72.1 (HH) 35 - 45 mmHg    POC PO2 90 80 - 100 mmHg    POC HCO3 37.4 (H) 24 - 28 mmol/L    POC BE 8 -2 to 2 mmol/L    POC SATURATED O2 96 95 - 100 %    POC TCO2 40 (H) 23 - 27 mmol/L    Sample ARTERIAL     Site RR     Allens Test N/A     DelSys Nasal Can     Mode SPONT     Flow 3     Sp02 99          Significant Imaging: I have reviewed all pertinent imaging results/findings within the past 24 hours.

## 2022-07-23 NOTE — SUBJECTIVE & OBJECTIVE
"Interval History: No acute events overnight.  This is his 7th hospitalization since 12/2021.  States he is compliant and smokes only 4-5 cigarettes a week.  Feeling better - about "50% of normal".  Denies chest pain.  Asks that his O2 be turned down to his home 1LPM.  All questions answered and patient had no further complaints.    Review of Systems   Constitutional:  Positive for activity change. Negative for chills and fever.   HENT:  Positive for congestion.    Eyes:  Negative for photophobia.   Respiratory:  Positive for cough, shortness of breath and wheezing. Negative for chest tightness.    Cardiovascular:  Negative for chest pain and palpitations.   Gastrointestinal:  Negative for diarrhea, nausea and vomiting.   Endocrine: Positive for cold intolerance.   Genitourinary:  Negative for dysuria.   Musculoskeletal:  Negative for myalgias.   Skin:  Negative for rash.   Allergic/Immunologic: Negative for immunocompromised state.   Neurological:  Negative for light-headedness.   Hematological:  Does not bruise/bleed easily.   Psychiatric/Behavioral:  Negative for decreased concentration.    Objective:     Vital Signs (Most Recent):  Temp: 97.7 °F (36.5 °C) (07/23/22 1554)  Pulse: 70 (07/23/22 1554)  Resp: 18 (07/23/22 1554)  BP: (!) 150/78 (07/23/22 1554)  SpO2: 96 % (07/23/22 1554)   Vital Signs (24h Range):  Temp:  [97.6 °F (36.4 °C)-98.2 °F (36.8 °C)] 97.7 °F (36.5 °C)  Pulse:  [61-96] 70  Resp:  [16-19] 18  SpO2:  [90 %-99 %] 96 %  BP: (109-158)/(63-82) 150/78     Weight: 58.6 kg (129 lb 3 oz)  Body mass index is 20.23 kg/m².    Intake/Output Summary (Last 24 hours) at 7/23/2022 1613  Last data filed at 7/23/2022 0840  Gross per 24 hour   Intake 1239.13 ml   Output 1100 ml   Net 139.13 ml      Physical Exam  Vitals and nursing note reviewed.   Constitutional:       General: He is not in acute distress.     Appearance: Normal appearance. He is not ill-appearing, toxic-appearing or diaphoretic.   HENT:      " Head: Normocephalic and atraumatic.      Right Ear: External ear normal.      Left Ear: External ear normal.      Nose: Nose normal.      Mouth/Throat:      Mouth: Mucous membranes are moist.      Dentition: Abnormal dentition. Dental caries present.   Eyes:      Extraocular Movements: Extraocular movements intact.      Conjunctiva/sclera: Conjunctivae normal.      Right eye: Right conjunctiva is not injected.      Left eye: Left conjunctiva is not injected.      Comments: ?Mild exophthalmos   Neck:      Thyroid: No thyroid mass or thyromegaly.   Cardiovascular:      Rate and Rhythm: Normal rate and regular rhythm.      Heart sounds:   No systolic murmur is present.   Pulmonary:      Effort: Pulmonary effort is normal.      Breath sounds: Decreased breath sounds present. No rhonchi.      Comments: Mild diffuse wheezes, breathing comfortably, speaking in full sentences  Chest:      Chest wall: No swelling or tenderness.   Abdominal:      General: Abdomen is flat. Bowel sounds are normal.      Palpations: Abdomen is soft.      Tenderness: There is no abdominal tenderness.   Genitourinary:     Comments: No pulliam in place  Musculoskeletal:      Cervical back: Full passive range of motion without pain and normal range of motion. No rigidity.   Lymphadenopathy:      Comments: No peripheral edema   Skin:     General: Skin is cool and dry.   Neurological:      Mental Status: He is alert and oriented to person, place, and time. Mental status is at baseline.   Psychiatric:         Attention and Perception: Attention and perception normal.         Mood and Affect: Mood normal.         Behavior: Behavior is cooperative.         Cognition and Memory: Cognition and memory normal.       Significant Labs: All pertinent labs within the past 24 hours have been reviewed.    Significant Imaging: I have reviewed all pertinent imaging results/findings within the past 24 hours.

## 2022-07-23 NOTE — ASSESSMENT & PLAN NOTE
-Admitted to inpatient status  -Noted tachypnea, increased work of breathing and respiratory distress with wheezing on admit  -At home he is on 1L NC O2 and bipap qhs.  -CXR showed lungs are slightly hyperexpanded without confluent consolidation.  -On admit ABG showed a respiratory acidosis with pCO2 of 80.2  -This is due to COPD exacerbation  -This is his 7th hospitalization since 12/2021  -Treat with prednisone, scheduled nebulizers, roflumilast, fluticasone/vilanterol, doxycycline, supplemental o2 and bipap qhs  -Clinically improving.  Possible discharge tomorrow.

## 2022-07-23 NOTE — H&P
"[facility]  History & Physical    Patient Name: Baltazar Mccray  MRN: [id number]  Patient Class: IP- Inpatient  Admission Date: 7/22/2022  Attending Physician: Davis Devries MD   Primary Care Provider: [facility]         Patient information was obtained from patient, past medical records and ER records.     Subjective:     Principal Problem:COPD exacerbation    Chief Complaint:   Chief Complaint   Patient presents with    Shortness of Breath     Called EMS for SOB, hx of COPD, normally on 1L (couldn't get a room air sat). 97% after tx and 3L        HPI: Mr. Mccray is a 61yo man with Afib, CHF, CAD and COPD.  His last TTE was 1/29/22 and showed EF 55%, grade I diastolic dysfunction, PAP 50, and normal RV function.     He now comes as a transfer from [facility] for capacity issues.  He tells me he has very frequent COPD exacerbations, but is usually able to control them at home on his BiPAP and with nebs.  Over the past 4 days he has noticed significant worsening of his SOB, especially at night.  He is having to wake up and cough, and can't get comfortable.  His cough is mostly dry, but with occasional green sputum.  He denies fever or chills.  He has no PND or leg swelling, and he states, "this doesn't feel like my heart."  He denies CP, N/V.  He does report some wheezing.    As noted by our , Dr. Herron:  62-year-old male with a history of atrial fibrillation, CHF, COPD (1 L nasal cannula at home), home BiPAP use, and coronary artery disease presented to [facility] Emergency Department on July 22 with worsening dyspnea.  In the emergency department his initial oxygen saturations were between 60 and 70%.  He noted over the past 3 weeks he has been more short of breath with difficulty sleeping.  He felt like his BiPAP was not helping.  No fever chills, no chest pain, and no nausea or vomiting.  He was found to be hyponatremic in the emergency department. "  In the emergency department he received acetaminophen, nebulizer treatments, and Solu-Medrol.  Case discussed with the referring provider and with Hospital Medicine at [facility]. Referring provider noted patient appeared stable.  Will plan transfer to Hospital Medicine at [facility] for further treatment of COPD exacerbation.     COVID vaccinated///COVID negative  Sodium 129 (137 on April 1, 2022), potassium 5, chloride 88, CO2 30, BUN 4, creatinine 0.5, glucose 71, AST 20, ALT 7, troponin 0.009, BNP 81, white blood cells 6.5, hemoglobin 13.5, hematocrit 42.9, platelets 437.  Venous pH 7.302.  Chest x-ray shows monitoring leads in place.  Normal heart size and pulmonary vessels.  Lungs are slightly hyperexpanded.  No confluent consolidation.  EKG showed sinus tachycardia with ventricular rate 117, right atrial enlargement.      Past Medical History:   Diagnosis Date    Asthma     Atrial fibrillation with rapid ventricular response     CHF (congestive heart failure)     COPD (chronic obstructive pulmonary disease)     home O2 at night only    Coronary artery disease     Hypertension     Lung nodule     Pneumonia     Seizures        Past Surgical History:   Procedure Laterality Date    ESOPHAGOGASTRODUODENOSCOPY N/A 2/11/2022    Procedure: EGD (ESOPHAGOGASTRODUODENOSCOPY);  Surgeon: Cain Ortega MD;  Location: [facility] ENDO ([address] [place] [address]);  Service: Endoscopy;  Laterality: N/A;    LEFT HEART CATHETERIZATION  4/23/2020    Procedure: Left heart cath;  Surgeon: Nic Pollack MD;  Location: [facility] CATH LAB;  Service: Cardiology;;       Review of patient's allergies indicates:   Allergen Reactions    Benazepril Swelling    Duoneb [ipratropium-albuterol]      Report minor Tremors, pt seems to be tolerating well.       No current facility-administered medications on file prior to encounter.     Current Outpatient Medications on File Prior to Encounter   Medication Sig    acetaminophen (TYLENOL) 325  MG tablet Take 2 tablets (650 mg total) by mouth every 8 (eight) hours as needed for Pain or Temperature greater than (100.5).    albuterol (PROVENTIL/VENTOLIN HFA) 90 mcg/actuation inhaler Inhale 1-2 puffs into the lungs every 6 (six) hours as needed for Wheezing. Rescue    albuterol-ipratropium (DUO-NEB) 2.5 mg-0.5 mg/3 mL nebulizer solution Take 3 mLs by nebulization every 4 (four) hours as needed for Wheezing or Shortness of Breath. Rescue    budesonide-glycopyr-formoterol (BREZTRI AEROSPHERE) 160-9-4.8 mcg/actuation HFAA Inhale 2 puffs into the lungs 2 (two) times daily. Rinse mouth after use. CONTROL INHALER    carvediloL (COREG) 12.5 MG tablet Take 1 tablet (12.5 mg total) by mouth 2 (two) times daily with meals.    furosemide (LASIX) 20 MG tablet Take 1 tablet (20 mg total) by mouth once daily.    pantoprazole (PROTONIX) 40 MG tablet Take 1 tablet (40 mg total) by mouth 2 (two) times daily.    roflumilast (DALIRESP) 500 mcg Tab Take 1 tablet (500 mcg total) by mouth once daily.    roflumilast 250 mcg Tab Take 1 tablet (250 mcg total) by mouth once daily at 6am. Take 250mg daily for 1 month followed by 500mg daily    [DISCONTINUED] tiotropium bromide (SPIRIVA RESPIMAT) 2.5 mcg/actuation inhaler Inhale 2 puffs into the lungs Daily. Controller     Family History       Problem Relation (Age of Onset)    Cancer Father    Hypertension Sister    Stroke Sister, Brother          Tobacco Use    Smoking status: Current Some Day Smoker     Packs/day: 0.25     Types: Cigarettes    Smokeless tobacco: Never Used    Tobacco comment: 1-2 per day   Substance and Sexual Activity    Alcohol use: Yes     Alcohol/week: 2.0 standard drinks     Types: 2 Cans of beer per week     Comment: every night    Drug use: No    Sexual activity: Not Currently     Review of Systems   Constitutional:  Positive for activity change. Negative for chills and fever.   HENT:  Positive for congestion.    Eyes:  Negative for photophobia.    Respiratory:  Positive for cough, chest tightness, shortness of breath and wheezing.    Cardiovascular:  Negative for chest pain and palpitations.   Gastrointestinal:  Negative for diarrhea, nausea and vomiting.   Endocrine: Positive for cold intolerance.   Genitourinary:  Negative for dysuria.   Musculoskeletal:  Negative for myalgias.   Skin:  Negative for rash.   Allergic/Immunologic: Negative for immunocompromised state.   Neurological:  Negative for light-headedness.   Hematological:  Does not bruise/bleed easily.   Psychiatric/Behavioral:  Negative for decreased concentration.    Objective:     Vital Signs (Most Recent):  Temp: 97.9 °F (36.6 °C) (07/22/22 1538)  Pulse: 95 (07/22/22 1719)  Resp: 18 (07/22/22 1719)  BP: (!) 149/69 (07/22/22 1538)  SpO2: 99 % (07/22/22 1719)   Vital Signs (24h Range):  Temp:  [97.9 °F (36.6 °C)-98.2 °F (36.8 °C)] 97.9 °F (36.6 °C)  Pulse:  [] 95  Resp:  [18-31] 18  SpO2:  [71 %-100 %] 99 %  BP: (124-160)/() 149/69     Weight: 58.6 kg (129 lb 3 oz)  Body mass index is 20.23 kg/m².    Physical Exam  Constitutional:       General: He is not in acute distress.     Appearance: Normal appearance. He is not ill-appearing, toxic-appearing or diaphoretic.   HENT:      Head: Normocephalic and atraumatic.      Mouth/Throat:      Dentition: Abnormal dentition. Dental caries present.   Eyes:      Conjunctiva/sclera:      Right eye: Right conjunctiva is not injected.      Left eye: Left conjunctiva is not injected.      Comments: ?Mild exophthalmos   Neck:      Thyroid: No thyroid mass or thyromegaly.   Cardiovascular:      Rate and Rhythm: Normal rate and regular rhythm.      Heart sounds:   No systolic murmur is present.   Pulmonary:      Effort: Pulmonary effort is normal.      Breath sounds: Decreased breath sounds and rales present. No wheezing or rhonchi.   Chest:      Chest wall: No swelling or tenderness.   Abdominal:      General: Abdomen is flat. Bowel sounds are  normal.      Palpations: Abdomen is soft.      Tenderness: There is no abdominal tenderness.   Genitourinary:     Comments: No pulliam in place  Musculoskeletal:      Cervical back: Full passive range of motion without pain.   Lymphadenopathy:      Comments: No peripheral edema   Skin:     General: Skin is cool and dry.   Neurological:      Mental Status: He is alert.   Psychiatric:         Attention and Perception: Attention and perception normal.         Mood and Affect: Mood normal.         Behavior: Behavior is cooperative.         Cognition and Memory: Cognition and memory normal.           Significant Labs: All pertinent labs within the past 24 hours have been reviewed.  Recent Results (from the past 24 hour(s))   ISTAT PROCEDURE    Collection Time: 07/22/22  9:05 AM   Result Value Ref Range    POC PH 7.302 (L) 7.35 - 7.45    POC PCO2 80.2 (H) 35 - 45 mmHg    POC PO2 17 (L) 40 - 60 mmHg    POC HCO3 39.6 (H) 24 - 28 mmol/L    POC BE 13 -2 to 2 mmol/L    POC SATURATED O2 19 (L) 95 - 100 %    POC TCO2 42 (H) 24 - 29 mmol/L    Sample VENOUS     Site Other     Allens Test N/A    COVID-19 Rapid Screening    Collection Time: 07/22/22  9:35 AM   Result Value Ref Range    SARS-CoV-2 RNA, Amplification, Qual Negative Negative   CBC auto differential    Collection Time: 07/22/22  9:37 AM   Result Value Ref Range    WBC 6.50 3.90 - 12.70 K/uL    RBC 5.81 4.60 - 6.20 M/uL    Hemoglobin 13.5 (L) 14.0 - 18.0 g/dL    Hematocrit 42.9 40.0 - 54.0 %    MCV 74 (L) 82 - 98 fL    MCH 23.2 (L) 27.0 - 31.0 pg    MCHC 31.5 (L) 32.0 - 36.0 g/dL    RDW 20.1 (H) 11.5 - 14.5 %    Platelets 437 150 - 450 K/uL    MPV 9.9 9.2 - 12.9 fL    Immature Granulocytes 0.3 0.0 - 0.5 %    Gran # (ANC) 2.8 1.8 - 7.7 K/uL    Immature Grans (Abs) 0.02 0.00 - 0.04 K/uL    Lymph # 2.6 1.0 - 4.8 K/uL    Mono # 0.6 0.3 - 1.0 K/uL    Eos # 0.5 0.0 - 0.5 K/uL    Baso # 0.10 0.00 - 0.20 K/uL    nRBC 0 0 /100 WBC    Gran % 43.0 38.0 - 73.0 %    Lymph % 39.2 18.0 -  48.0 %    Mono % 8.8 4.0 - 15.0 %    Eosinophil % 7.2 0.0 - 8.0 %    Basophil % 1.5 0.0 - 1.9 %    Differential Method Automated    Troponin I    Collection Time: 07/22/22  9:37 AM   Result Value Ref Range    Troponin I 0.009 0.000 - 0.026 ng/mL   Brain natriuretic peptide    Collection Time: 07/22/22  9:37 AM   Result Value Ref Range    BNP 81 0 - 99 pg/mL   HIV 1/2 Ag/Ab (4th Gen)    Collection Time: 07/22/22 10:10 AM   Result Value Ref Range    HIV 1/2 Ag/Ab Negative Negative   Comprehensive metabolic panel    Collection Time: 07/22/22 10:28 AM   Result Value Ref Range    Sodium 129 (L) 136 - 145 mmol/L    Potassium 5.0 3.5 - 5.1 mmol/L    Chloride 88 (L) 95 - 110 mmol/L    CO2 30 (H) 23 - 29 mmol/L    Glucose 71 70 - 110 mg/dL    BUN 4 (L) 6 - 20 mg/dL    Creatinine 0.8 0.5 - 1.4 mg/dL    Calcium 9.6 8.7 - 10.5 mg/dL    Total Protein 7.9 6.0 - 8.4 g/dL    Albumin 3.2 (L) 3.5 - 5.2 g/dL    Total Bilirubin 0.5 0.1 - 1.0 mg/dL    Alkaline Phosphatase 70 55 - 135 U/L    AST 20 10 - 40 U/L    ALT 7 (L) 10 - 44 U/L    Anion Gap 11 8 - 16 mmol/L    eGFR if African American >60.0 >60 mL/min/1.73 m^2    eGFR if non African American >60.0 >60 mL/min/1.73 m^2   ISTAT PROCEDURE    Collection Time: 07/22/22  5:21 PM   Result Value Ref Range    POC PH 7.323 (L) 7.35 - 7.45    POC PCO2 72.1 (HH) 35 - 45 mmHg    POC PO2 90 80 - 100 mmHg    POC HCO3 37.4 (H) 24 - 28 mmol/L    POC BE 8 -2 to 2 mmol/L    POC SATURATED O2 96 95 - 100 %    POC TCO2 40 (H) 23 - 27 mmol/L    Sample ARTERIAL     Site RR     Allens Test N/A     DelSys Nasal Can     Mode SPONT     Flow 3     Sp02 99          Significant Imaging: I have reviewed all pertinent imaging results/findings within the past 24 hours.    Assessment/Plan:     * COPD exacerbation  The best pathway COPD order sets have been initiated  The patient states he is already feeling better now    Current Facility-Administered Medications:     albuterol-ipratropium 2.5 mg-0.5 mg/3 mL  nebulizer solution 3 mL, 3 mL, Nebulization, Q4H PRN and q6 scheduled    [START ON 7/23/2022] fluticasone furoate-vilanteroL 100-25 mcg/dose diskus inhaler 1 puff, 1 puff, Inhalation, Daily    PpredniSONE tablet 40 mg, 40 mg, Oral, Daily, now    [START ON 7/23/2022] roflumilast (DALIRESP) tablet 500 mcg, 500 mcg, Oral, Daily, Davis Devries MD          Acute respiratory failure with hypoxia and hypercarbia  Patient with Hypercapnic and Hypoxic Respiratory failure which is Acute on chronic.  he is not on home oxygen. Supplemental oxygen was provided and noted- Oxygen Concentration (%):  [30] 30.   Signs/symptoms of respiratory failure include- tachypnea, increased work of breathing and respiratory distress. Contributing diagnoses includes - CHF and COPD Labs and images were reviewed. Patient Has recent ABG, which has been reviewed. Will treat underlying causes and adjust management of respiratory failure as follows: See COPD    BiPAP (biphasic positive airway pressure) dependence  His home BiPAP settings HS have been ordered  He has no clinical signs of respiratory hypercapnia      Acute bronchitis  Increasing cough with green sputum  No fever and normal CXR      benzonatate capsule 100 mg, 100 mg, Oral, TID PRN, cough    dextromethorphan-guaiFENesin  mg/5 ml liquid 10 mL, 10 mL, Oral, Q6H x6    doxycycline tablet 100 mg, 100 mg, Oral, Q12H         Chronic diastolic congestive heart failure  His last TTE was 1/29/22 and showed EF 55%, grade I diastolic dysfunction, PAP 50, and normal RV function.   He appears more dry than overloaded  BNP is normal    Consider adding ACE at some point        carvediloL tablet 12.5 mg, 12.5 mg, Oral, BID WM      [START ON 7/23/2022] furosemide tablet 20 mg, 20 mg, Oral, Daily         Hyponatremia  I suspect he is a tiny bit fluid down   cc iv x 1  Follow dailly      Essential hypertension  Continue home Coreg      Microcytic anemia  Ordered B12, folate,  Fe      Alcohol use disorder, mild, abuse  Stable currently to monitor      Atrial fibrillation  Patient with Paroxysmal (<7 days) atrial fibrillation which is controlled currently with Beta Blocker. Patient is currently in sinus rhythm.[id number] Score: 1. HASBLED Score: Unable to calculate. Anticoagulation not indicated.        VTE Risk Mitigation (From admission, onward)         Ordered     enoxaparin injection 40 mg  Daily         07/22/22 1629                   OVIDIO Mckinney MD  Department of [facility]   [facility]

## 2022-07-23 NOTE — ASSESSMENT & PLAN NOTE
Increasing cough with green sputum  No fever and normal CXR      benzonatate capsule 100 mg, 100 mg, Oral, TID PRN, cough    dextromethorphan-guaiFENesin  mg/5 ml liquid 10 mL, 10 mL, Oral, Q6H x6    doxycycline tablet 100 mg, 100 mg, Oral, Q12H

## 2022-07-23 NOTE — NURSING
PER handoff received from DONNY Peralta     Pt resting in bed quietly. NAD noted.   Fall and safety precautions maintained. Bed alarm activated and audible.. Bed locked in lowest position, with side rails up x2. Call bell and personal items within reach

## 2022-07-23 NOTE — ASSESSMENT & PLAN NOTE
Patient with Hypercapnic Respiratory failure which is Acute on chronic.  he is on 1L NC at home. Supplemental oxygen was provided and noted- Oxygen Concentration (%):  [30] 30.   Signs/symptoms of respiratory failure include- increased work of breathing and respiratory distress. Contributing diagnoses includes - COPD Labs and images were reviewed. Patient Has recent ABG, which has been reviewed. Will treat underlying causes and adjust management of respiratory failure as follows    COPD exacerbation:  Hyperinflation on CXR. No infiltrates.  Prednisone Po for 5 days.   Doxy for 5 days.   Duonebs as needed.   Wean oxygen.   Pulmonary follow up on discharge.

## 2022-07-23 NOTE — ASSESSMENT & PLAN NOTE
-Hb 13.5 and now 12.1  -No evidence of bleeding  -He is iron deficient - Tsat 6%  -Await b12 and folate  -Start FeSo4  -Repeat cbc in AM.

## 2022-07-23 NOTE — NURSING
Bedside Report given to night nurse Fabian/katieRN. Dr. Devries has not seen pt as of yet. Bipap in place. Walking rounds completed. Visualized and assessed patient NAD noted. Safety precautions maintained and call light within reach.     Chart check completed.

## 2022-07-23 NOTE — ASSESSMENT & PLAN NOTE
-Echo 1/29/22 showed EF 55%, grade I diastolic dysfunction, PAP 50, and normal RV function.   -BNP normal on admit and appeared hypovolemic  -Strict ins/outs and daily weights  -Holding home lasix 20mg qd and got 500cc NS on admit  -Continue coreg  -Consider acei at some point

## 2022-07-23 NOTE — SUBJECTIVE & OBJECTIVE
Past Medical History:   Diagnosis Date    Asthma     Atrial fibrillation with rapid ventricular response     CHF (congestive heart failure)     COPD (chronic obstructive pulmonary disease)     home O2 at night only    Coronary artery disease     Hypertension     Lung nodule     Pneumonia     Seizures        Past Surgical History:   Procedure Laterality Date    ESOPHAGOGASTRODUODENOSCOPY N/A 2/11/2022    Procedure: EGD (ESOPHAGOGASTRODUODENOSCOPY);  Surgeon: Cain Ortega MD;  Location: [facility] ENDO ([address] [place] [address]);  Service: Endoscopy;  Laterality: N/A;    LEFT HEART CATHETERIZATION  4/23/2020    Procedure: Left heart cath;  Surgeon: Nic Pollack MD;  Location: [facility] CATH LAB;  Service: Cardiology;;       Review of patient's allergies indicates:   Allergen Reactions    Benazepril Swelling    Duoneb [ipratropium-albuterol]      Report minor Tremors, pt seems to be tolerating well.       No current facility-administered medications on file prior to encounter.     Current Outpatient Medications on File Prior to Encounter   Medication Sig    acetaminophen (TYLENOL) 325 MG tablet Take 2 tablets (650 mg total) by mouth every 8 (eight) hours as needed for Pain or Temperature greater than (100.5).    albuterol (PROVENTIL/VENTOLIN HFA) 90 mcg/actuation inhaler Inhale 1-2 puffs into the lungs every 6 (six) hours as needed for Wheezing. Rescue    albuterol-ipratropium (DUO-NEB) 2.5 mg-0.5 mg/3 mL nebulizer solution Take 3 mLs by nebulization every 4 (four) hours as needed for Wheezing or Shortness of Breath. Rescue    budesonide-glycopyr-formoterol (BREZTRI AEROSPHERE) 160-9-4.8 mcg/actuation HFAA Inhale 2 puffs into the lungs 2 (two) times daily. Rinse mouth after use. CONTROL INHALER    carvediloL (COREG) 12.5 MG tablet Take 1 tablet (12.5 mg total) by mouth 2 (two) times daily with meals.    furosemide (LASIX) 20 MG tablet Take 1 tablet (20 mg total) by mouth once daily.    pantoprazole (PROTONIX) 40 MG  tablet Take 1 tablet (40 mg total) by mouth 2 (two) times daily.    roflumilast (DALIRESP) 500 mcg Tab Take 1 tablet (500 mcg total) by mouth once daily.    roflumilast 250 mcg Tab Take 1 tablet (250 mcg total) by mouth once daily at 6am. Take 250mg daily for 1 month followed by 500mg daily    [DISCONTINUED] tiotropium bromide (SPIRIVA RESPIMAT) 2.5 mcg/actuation inhaler Inhale 2 puffs into the lungs Daily. Controller     Family History       Problem Relation (Age of Onset)    Cancer Father    Hypertension Sister    Stroke Sister, Brother          Tobacco Use    Smoking status: Current Some Day Smoker     Packs/day: 0.25     Types: Cigarettes    Smokeless tobacco: Never Used    Tobacco comment: 1-2 per day   Substance and Sexual Activity    Alcohol use: Yes     Alcohol/week: 2.0 standard drinks     Types: 2 Cans of beer per week     Comment: every night    Drug use: No    Sexual activity: Not Currently     Review of Systems   Constitutional:  Positive for activity change. Negative for chills and fever.   HENT:  Positive for congestion.    Eyes:  Negative for photophobia.   Respiratory:  Positive for cough, chest tightness, shortness of breath and wheezing.    Cardiovascular:  Negative for chest pain and palpitations.   Gastrointestinal:  Negative for diarrhea, nausea and vomiting.   Endocrine: Positive for cold intolerance.   Genitourinary:  Negative for dysuria.   Musculoskeletal:  Negative for myalgias.   Skin:  Negative for rash.   Allergic/Immunologic: Negative for immunocompromised state.   Neurological:  Negative for light-headedness.   Hematological:  Does not bruise/bleed easily.   Psychiatric/Behavioral:  Negative for decreased concentration.    Objective:     Vital Signs (Most Recent):  Temp: 98.2 °F (36.8 °C) (07/23/22 0808)  Pulse: 68 (07/23/22 0828)  Resp: 19 (07/23/22 0828)  BP: 109/63 (07/23/22 0808)  SpO2: 95 % (07/23/22 0828)   Vital Signs (24h Range):  Temp:  [97.6 °F (36.4 °C)-98.2 °F  (36.8 °C)] 98.2 °F (36.8 °C)  Pulse:  [] 68  Resp:  [16-24] 19  SpO2:  [88 %-99 %] 95 %  BP: (109-149)/(63-82) 109/63     Weight: 58.6 kg (129 lb 3 oz)  Body mass index is 20.23 kg/m².    Physical Exam  Constitutional:       General: He is not in acute distress.     Appearance: Normal appearance. He is not ill-appearing, toxic-appearing or diaphoretic.   HENT:      Head: Normocephalic and atraumatic.      Mouth/Throat:      Dentition: Abnormal dentition. Dental caries present.   Eyes:      Conjunctiva/sclera:      Right eye: Right conjunctiva is not injected.      Left eye: Left conjunctiva is not injected.      Comments: ?Mild exophthalmos   Neck:      Thyroid: No thyroid mass or thyromegaly.   Cardiovascular:      Rate and Rhythm: Normal rate and regular rhythm.      Heart sounds:   No systolic murmur is present.   Pulmonary:      Effort: Pulmonary effort is normal.      Breath sounds: Decreased breath sounds and rales present. No wheezing or rhonchi.   Chest:      Chest wall: No swelling or tenderness.   Abdominal:      General: Abdomen is flat. Bowel sounds are normal.      Palpations: Abdomen is soft.      Tenderness: There is no abdominal tenderness.   Genitourinary:     Comments: No pulliam in place  Musculoskeletal:      Cervical back: Full passive range of motion without pain.   Lymphadenopathy:      Comments: No peripheral edema   Skin:     General: Skin is cool and dry.   Neurological:      Mental Status: He is alert.   Psychiatric:         Attention and Perception: Attention and perception normal.         Mood and Affect: Mood normal.         Behavior: Behavior is cooperative.         Cognition and Memory: Cognition and memory normal.           Significant Labs: All pertinent labs within the past 24 hours have been reviewed.  Recent Results (from the past 24 hour(s))   ISTAT PROCEDURE    Collection Time: 07/22/22  5:21 PM   Result Value Ref Range    POC PH 7.323 (L) 7.35 - 7.45    POC PCO2 72.1 (HH)  35 - 45 mmHg    POC PO2 90 80 - 100 mmHg    POC HCO3 37.4 (H) 24 - 28 mmol/L    POC BE 8 -2 to 2 mmol/L    POC SATURATED O2 96 95 - 100 %    POC TCO2 40 (H) 23 - 27 mmol/L    Sample ARTERIAL     Site RR     Allens Test N/A     DelSys Nasal Can     Mode SPONT     Flow 3     Sp02 99    Iron and TIBC    Collection Time: 07/22/22  8:38 PM   Result Value Ref Range    Iron 20 (L) 45 - 160 ug/dL    Transferrin 223 200 - 375 mg/dL    TIBC 330 250 - 450 ug/dL    Saturated Iron 6 (L) 20 - 50 %   Basic metabolic panel    Collection Time: 07/23/22  5:03 AM   Result Value Ref Range    Sodium 132 (L) 136 - 145 mmol/L    Potassium 5.1 3.5 - 5.1 mmol/L    Chloride 97 95 - 110 mmol/L    CO2 30 (H) 23 - 29 mmol/L    Glucose 115 (H) 70 - 110 mg/dL    BUN 10 6 - 20 mg/dL    Creatinine 0.7 0.5 - 1.4 mg/dL    Calcium 9.4 8.7 - 10.5 mg/dL    Anion Gap 5 (L) 8 - 16 mmol/L    eGFR if African American >60 >60 mL/min/1.73 m^2    eGFR if non African American >60 >60 mL/min/1.73 m^2   Magnesium    Collection Time: 07/23/22  5:03 AM   Result Value Ref Range    Magnesium 1.8 1.6 - 2.6 mg/dL   Phosphorus    Collection Time: 07/23/22  5:03 AM   Result Value Ref Range    Phosphorus 3.6 2.7 - 4.5 mg/dL   CBC auto differential    Collection Time: 07/23/22  5:03 AM   Result Value Ref Range    WBC 3.55 (L) 3.90 - 12.70 K/uL    RBC 5.33 4.60 - 6.20 M/uL    Hemoglobin 12.1 (L) 14.0 - 18.0 g/dL    Hematocrit 39.0 (L) 40.0 - 54.0 %    MCV 73 (L) 82 - 98 fL    MCH 22.7 (L) 27.0 - 31.0 pg    MCHC 31.0 (L) 32.0 - 36.0 g/dL    RDW 19.0 (H) 11.5 - 14.5 %    Platelets 382 150 - 450 K/uL    MPV 9.6 9.2 - 12.9 fL    Immature Granulocytes 0.6 (H) 0.0 - 0.5 %    Gran # (ANC) 2.9 1.8 - 7.7 K/uL    Immature Grans (Abs) 0.02 0.00 - 0.04 K/uL    Lymph # 0.5 (L) 1.0 - 4.8 K/uL    Mono # 0.1 (L) 0.3 - 1.0 K/uL    Eos # 0.0 0.0 - 0.5 K/uL    Baso # 0.00 0.00 - 0.20 K/uL    nRBC 0 0 /100 WBC    Gran % 82.8 (H) 38.0 - 73.0 %    Lymph % 15.2 (L) 18.0 - 48.0 %    Mono % 1.4 (L)  4.0 - 15.0 %    Eosinophil % 0.0 0.0 - 8.0 %    Basophil % 0.0 0.0 - 1.9 %    Differential Method Automated          Significant Imaging: I have reviewed all pertinent imaging results/findings within the past 24 hours.

## 2022-07-23 NOTE — HPI
62 year old male followed by Sherif Castillo who presents as transfer from [facility] due to capacity. Patient last saw Dr. Pascal on 7/7/2022. He is currently on 2L NC. He uses 1L NC at home.     Dr. Herron's assessment below.     62-year-old male with a history of atrial fibrillation, CHF, COPD (1 L nasal cannula at home), home BiPAP use, and coronary artery disease presented to [facility] Emergency Department on July 22 with worsening dyspnea.  In the emergency department his initial oxygen saturations were between 60 and 70%.  He noted over the past 3 weeks he has been more short of breath with difficulty sleeping.  He felt like his BiPAP was not helping.  No fever chills, no chest pain, and no nausea or vomiting.  He was found to be hyponatremic in the emergency department.  In the emergency department he received acetaminophen, nebulizer treatments, and Solu-Medrol.  Case discussed with the referring provider and with Hospital Medicine at [facility]. Referring provider noted patient appeared stable.  Will plan transfer to Hospital Medicine at [facility] for further treatment of COPD exacerbation.     COVID vaccinated///COVID negative  Sodium 129 (137 on April 1, 2022), potassium 5, chloride 88, CO2 30, BUN 4, creatinine 0.5, glucose 71, AST 20, ALT 7, troponin 0.009, BNP 81, white blood cells 6.5, hemoglobin 13.5, hematocrit 42.9, platelets 437.  Venous pH 7.302.  Chest x-ray shows monitoring leads in place.  Normal heart size and pulmonary vessels.  Lungs are slightly hyperexpanded.  No confluent consolidation.  EKG showed sinus tachycardia with ventricular rate 117, right atrial enlargement

## 2022-07-23 NOTE — HPI
"Mr. Mccray is a 63yo man with Afib, CHF, CAD and COPD.  His last TTE was 1/29/22 and showed EF 55%, grade I diastolic dysfunction, PAP 50, and normal RV function.     He now comes as a transfer from [facility] for capacity issues.  He tells me he has very frequent COPD exacerbations, but is usually able to control them at home on his BiPAP and with nebs.  Over the past 4 days he has noticed significant worsening of his SOB, especially at night.  He is having to wake up and cough, and can't get comfortable.  His cough is mostly dry, but with occasional green sputum.  He denies fever or chills.  He has no PND or leg swelling, and he states, "this doesn't feel like my heart."  He denies CP, N/V.  He does report some wheezing.    As noted by our , Dr. Herron:  62-year-old male with a history of atrial fibrillation, CHF, COPD (1 L nasal cannula at home), home BiPAP use, and coronary artery disease presented to [facility] Emergency Department on July 22 with worsening dyspnea.  In the emergency department his initial oxygen saturations were between 60 and 70%.  He noted over the past 3 weeks he has been more short of breath with difficulty sleeping.  He felt like his BiPAP was not helping.  No fever chills, no chest pain, and no nausea or vomiting.  He was found to be hyponatremic in the emergency department.  In the emergency department he received acetaminophen, nebulizer treatments, and Solu-Medrol.  Case discussed with the referring provider and with Hospital Medicine at [facility]. Referring provider noted patient appeared stable.  Will plan transfer to Hospital Medicine at [facility] for further treatment of COPD exacerbation.     COVID vaccinated///COVID negative  Sodium 129 (137 on April 1, 2022), potassium 5, chloride 88, CO2 30, BUN 4, creatinine 0.5, glucose 71, AST 20, ALT 7, troponin 0.009, BNP 81, white blood cells 6.5, hemoglobin 13.5, hematocrit 42.9, platelets 437.  Venous pH " 7.302.  Chest x-ray shows monitoring leads in place.  Normal heart size and pulmonary vessels.  Lungs are slightly hyperexpanded.  No confluent consolidation.  EKG showed sinus tachycardia with ventricular rate 117, right atrial enlargement.

## 2022-07-23 NOTE — PLAN OF CARE
Pt is AAOx4. 3L NC. Tele maintained.   Prn meds adm for headache and muscle spasms; moderate relief noted.   No falls or new injuries reported during shift, safety precautions maintained.    Problem: Adult Inpatient Plan of Care  Goal: Plan of Care Review  Outcome: Ongoing, Progressing     Problem: Adult Inpatient Plan of Care  Goal: Optimal Comfort and Wellbeing  Outcome: Ongoing, Progressing

## 2022-07-23 NOTE — ASSESSMENT & PLAN NOTE
-His home BiPAP settings HS have been ordered  -He has no clinical signs of respiratory hypercapnia despite pCO2 of 80

## 2022-07-23 NOTE — CONSULTS
[facility] - [place] - Telemetry  Pulmonology  Consult Note    Patient Name: Baltazar Mccray  MRN: [id number]  Admission Date: 7/22/2022  Hospital Length of Stay: 1 days  Code Status: Full Code  Attending Physician: Naeem Mccloud MD  Primary Care Provider: Rhonda Of Katia   Principal Problem: Acute on chronic respiratory failure with hypoxia and hypercapnia    [unfilled]  Subjective:     HPI:  62 year old male followed by Sherif Castillo who presents as transfer from [facility] due to capacity. Patient last saw Dr. Pascal on 7/7/2022. He is currently on 2L NC. He uses 1L NC at home.     Dr. Herron's assessment below.     62-year-old male with a history of atrial fibrillation, CHF, COPD (1 L nasal cannula at home), home BiPAP use, and coronary artery disease presented to [facility] on July 22 with worsening dyspnea.  In the emergency department his initial oxygen saturations were between 60 and 70%.  He noted over the past 3 weeks he has been more short of breath with difficulty sleeping.  He felt like his BiPAP was not helping.  No fever chills, no chest pain, and no nausea or vomiting.  He was found to be hyponatremic in the emergency department.  In the emergency department he received acetaminophen, nebulizer treatments, and Solu-Medrol.  Case discussed with the referring provider and with Hospital Medicine at [facility]. Referring provider noted patient appeared stable.  Will plan transfer to Hospital Medicine at [facility] for further treatment of COPD exacerbation.     COVID vaccinated///COVID negative  Sodium 129 (137 on April 1, 2022), potassium 5, chloride 88, CO2 30, BUN 4, creatinine 0.5, glucose 71, AST 20, ALT 7, troponin 0.009, BNP 81, white blood cells 6.5, hemoglobin 13.5, hematocrit 42.9, platelets 437.  Venous pH 7.302.  Chest x-ray shows monitoring leads in place.  Normal heart size and pulmonary vessels.  Lungs are slightly hyperexpanded.  No  confluent consolidation.  EKG showed sinus tachycardia with ventricular rate 117, right atrial enlargement            Past Medical History:   Diagnosis Date    Asthma     Atrial fibrillation with rapid ventricular response     CHF (congestive heart failure)     COPD (chronic obstructive pulmonary disease)     home O2 at night only    Coronary artery disease     Hypertension     Lung nodule     Pneumonia     Seizures        Past Surgical History:   Procedure Laterality Date    ESOPHAGOGASTRODUODENOSCOPY N/A 2/11/2022    Procedure: EGD (ESOPHAGOGASTRODUODENOSCOPY);  Surgeon: Cain Ortega MD;  Location: [facility] ENDO ([address] [place] [address]);  Service: Endoscopy;  Laterality: N/A;    LEFT HEART CATHETERIZATION  4/23/2020    Procedure: Left heart cath;  Surgeon: Nic Pollack MD;  Location: [facility] CATH LAB;  Service: Cardiology;;       Review of patient's allergies indicates:   Allergen Reactions    Benazepril Swelling    Duoneb [ipratropium-albuterol]      Report minor Tremors, pt seems to be tolerating well.       No current facility-administered medications on file prior to encounter.     Current Outpatient Medications on File Prior to Encounter   Medication Sig    acetaminophen (TYLENOL) 325 MG tablet Take 2 tablets (650 mg total) by mouth every 8 (eight) hours as needed for Pain or Temperature greater than (100.5).    albuterol (PROVENTIL/VENTOLIN HFA) 90 mcg/actuation inhaler Inhale 1-2 puffs into the lungs every 6 (six) hours as needed for Wheezing. Rescue    albuterol-ipratropium (DUO-NEB) 2.5 mg-0.5 mg/3 mL nebulizer solution Take 3 mLs by nebulization every 4 (four) hours as needed for Wheezing or Shortness of Breath. Rescue    budesonide-glycopyr-formoterol (BREZTRI AEROSPHERE) 160-9-4.8 mcg/actuation HFAA Inhale 2 puffs into the lungs 2 (two) times daily. Rinse mouth after use. CONTROL INHALER    carvediloL (COREG) 12.5 MG tablet Take 1 tablet (12.5 mg total) by mouth 2 (two) times daily with meals.     furosemide (LASIX) 20 MG tablet Take 1 tablet (20 mg total) by mouth once daily.    pantoprazole (PROTONIX) 40 MG tablet Take 1 tablet (40 mg total) by mouth 2 (two) times daily.    roflumilast (DALIRESP) 500 mcg Tab Take 1 tablet (500 mcg total) by mouth once daily.    roflumilast 250 mcg Tab Take 1 tablet (250 mcg total) by mouth once daily at 6am. Take 250mg daily for 1 month followed by 500mg daily    [DISCONTINUED] tiotropium bromide (SPIRIVA RESPIMAT) 2.5 mcg/actuation inhaler Inhale 2 puffs into the lungs Daily. Controller     Family History       Problem Relation (Age of Onset)    Cancer Father    Hypertension Sister    Stroke Sister, Brother          Tobacco Use    Smoking status: Current Some Day Smoker     Packs/day: 0.25     Types: Cigarettes    Smokeless tobacco: Never Used    Tobacco comment: 1-2 per day   Substance and Sexual Activity    Alcohol use: Yes     Alcohol/week: 2.0 standard drinks     Types: 2 Cans of beer per week     Comment: every night    Drug use: No    Sexual activity: Not Currently     Review of Systems   Constitutional:  Positive for activity change. Negative for chills and fever.   HENT:  Positive for congestion.    Eyes:  Negative for photophobia.   Respiratory:  Positive for cough, chest tightness, shortness of breath and wheezing.    Cardiovascular:  Negative for chest pain and palpitations.   Gastrointestinal:  Negative for diarrhea, nausea and vomiting.   Endocrine: Positive for cold intolerance.   Genitourinary:  Negative for dysuria.   Musculoskeletal:  Negative for myalgias.   Skin:  Negative for rash.   Allergic/Immunologic: Negative for immunocompromised state.   Neurological:  Negative for light-headedness.   Hematological:  Does not bruise/bleed easily.   Psychiatric/Behavioral:  Negative for decreased concentration.    Objective:     Vital Signs (Most Recent):  Temp: 98.2 °F (36.8 °C) (07/23/22 0808)  Pulse: 68 (07/23/22 0828)  Resp: 19 (07/23/22 0828)  BP:  109/63 (07/23/22 0808)  SpO2: 95 % (07/23/22 0828)   Vital Signs (24h Range):  Temp:  [97.6 °F (36.4 °C)-98.2 °F (36.8 °C)] 98.2 °F (36.8 °C)  Pulse:  [] 68  Resp:  [16-24] 19  SpO2:  [88 %-99 %] 95 %  BP: (109-149)/(63-82) 109/63     Weight: 58.6 kg (129 lb 3 oz)  Body mass index is 20.23 kg/m².    Physical Exam  Constitutional:       General: He is not in acute distress.     Appearance: Normal appearance. He is not ill-appearing, toxic-appearing or diaphoretic.   HENT:      Head: Normocephalic and atraumatic.      Mouth/Throat:      Dentition: Abnormal dentition. Dental caries present.   Eyes:      Conjunctiva/sclera:      Right eye: Right conjunctiva is not injected.      Left eye: Left conjunctiva is not injected.      Comments: ?Mild exophthalmos   Neck:      Thyroid: No thyroid mass or thyromegaly.   Cardiovascular:      Rate and Rhythm: Normal rate and regular rhythm.      Heart sounds:   No systolic murmur is present.   Pulmonary:      Effort: Pulmonary effort is normal.      Breath sounds: Decreased breath sounds and rales present. No wheezing or rhonchi.   Chest:      Chest wall: No swelling or tenderness.   Abdominal:      General: Abdomen is flat. Bowel sounds are normal.      Palpations: Abdomen is soft.      Tenderness: There is no abdominal tenderness.   Genitourinary:     Comments: No pulliam in place  Musculoskeletal:      Cervical back: Full passive range of motion without pain.   Lymphadenopathy:      Comments: No peripheral edema   Skin:     General: Skin is cool and dry.   Neurological:      Mental Status: He is alert.   Psychiatric:         Attention and Perception: Attention and perception normal.         Mood and Affect: Mood normal.         Behavior: Behavior is cooperative.         Cognition and Memory: Cognition and memory normal.           Significant Labs: All pertinent labs within the past 24 hours have been reviewed.  Recent Results (from the past 24 hour(s))   ISTAT PROCEDURE     Collection Time: 07/22/22  5:21 PM   Result Value Ref Range    POC PH 7.323 (L) 7.35 - 7.45    POC PCO2 72.1 (HH) 35 - 45 mmHg    POC PO2 90 80 - 100 mmHg    POC HCO3 37.4 (H) 24 - 28 mmol/L    POC BE 8 -2 to 2 mmol/L    POC SATURATED O2 96 95 - 100 %    POC TCO2 40 (H) 23 - 27 mmol/L    Sample ARTERIAL     Site RR     Allens Test N/A     DelSys Nasal Can     Mode SPONT     Flow 3     Sp02 99    Iron and TIBC    Collection Time: 07/22/22  8:38 PM   Result Value Ref Range    Iron 20 (L) 45 - 160 ug/dL    Transferrin 223 200 - 375 mg/dL    TIBC 330 250 - 450 ug/dL    Saturated Iron 6 (L) 20 - 50 %   Basic metabolic panel    Collection Time: 07/23/22  5:03 AM   Result Value Ref Range    Sodium 132 (L) 136 - 145 mmol/L    Potassium 5.1 3.5 - 5.1 mmol/L    Chloride 97 95 - 110 mmol/L    CO2 30 (H) 23 - 29 mmol/L    Glucose 115 (H) 70 - 110 mg/dL    BUN 10 6 - 20 mg/dL    Creatinine 0.7 0.5 - 1.4 mg/dL    Calcium 9.4 8.7 - 10.5 mg/dL    Anion Gap 5 (L) 8 - 16 mmol/L    eGFR if African American >60 >60 mL/min/1.73 m^2    eGFR if non African American >60 >60 mL/min/1.73 m^2   Magnesium    Collection Time: 07/23/22  5:03 AM   Result Value Ref Range    Magnesium 1.8 1.6 - 2.6 mg/dL   Phosphorus    Collection Time: 07/23/22  5:03 AM   Result Value Ref Range    Phosphorus 3.6 2.7 - 4.5 mg/dL   CBC auto differential    Collection Time: 07/23/22  5:03 AM   Result Value Ref Range    WBC 3.55 (L) 3.90 - 12.70 K/uL    RBC 5.33 4.60 - 6.20 M/uL    Hemoglobin 12.1 (L) 14.0 - 18.0 g/dL    Hematocrit 39.0 (L) 40.0 - 54.0 %    MCV 73 (L) 82 - 98 fL    MCH 22.7 (L) 27.0 - 31.0 pg    MCHC 31.0 (L) 32.0 - 36.0 g/dL    RDW 19.0 (H) 11.5 - 14.5 %    Platelets 382 150 - 450 K/uL    MPV 9.6 9.2 - 12.9 fL    Immature Granulocytes 0.6 (H) 0.0 - 0.5 %    Gran # (ANC) 2.9 1.8 - 7.7 K/uL    Immature Grans (Abs) 0.02 0.00 - 0.04 K/uL    Lymph # 0.5 (L) 1.0 - 4.8 K/uL    Mono # 0.1 (L) 0.3 - 1.0 K/uL    Eos # 0.0 0.0 - 0.5 K/uL    Baso # 0.00 0.00 -  0.20 K/uL    nRBC 0 0 /100 WBC    Gran % 82.8 (H) 38.0 - 73.0 %    Lymph % 15.2 (L) 18.0 - 48.0 %    Mono % 1.4 (L) 4.0 - 15.0 %    Eosinophil % 0.0 0.0 - 8.0 %    Basophil % 0.0 0.0 - 1.9 %    Differential Method Automated          Significant Imaging: I have reviewed all pertinent imaging results/findings within the past 24 hours.      ABG  Recent Labs   Lab 07/22/22  1721   PH 7.323*   PO2 90   PCO2 72.1*   HCO3 37.4*   BE 8     Assessment/Plan:     * Acute on chronic respiratory failure with hypoxia and hypercapnia  Patient with Hypercapnic Respiratory failure which is Acute on chronic.  he is on 1L NC at home. Supplemental oxygen was provided and noted- Oxygen Concentration (%):  [30] 30.   Signs/symptoms of respiratory failure include- increased work of breathing and respiratory distress. Contributing diagnoses includes - COPD Labs and images were reviewed. Patient Has recent ABG, which has been reviewed. Will treat underlying causes and adjust management of respiratory failure as follows    COPD exacerbation:  Hyperinflation on CXR. No infiltrates.  Prednisone Po for 5 days.   Doxy for 5 days.   Duonebs as needed.   Wean oxygen.   Pulmonary follow up on discharge.           Thank you for your consult. I will sign off. Please contact us if you have any additional questions.     Tarah Patel MD  Pulmonology  [facility] [place] [facility]

## 2022-07-23 NOTE — ASSESSMENT & PLAN NOTE
Patient with Paroxysmal (<7 days) atrial fibrillation which is controlled currently with Beta Blocker. Patient is currently in sinus rhythm.[id number] Score: 1. HASBLED Score: Unable to calculate. Anticoagulation not indicated.

## 2022-07-23 NOTE — ASSESSMENT & PLAN NOTE
Detail Level: Zone Patient with Hypercapnic and Hypoxic Respiratory failure which is Acute on chronic.  he is not on home oxygen. Supplemental oxygen was provided and noted- Oxygen Concentration (%):  [30] 30.   Signs/symptoms of respiratory failure include- tachypnea, increased work of breathing and respiratory distress. Contributing diagnoses includes - CHF and COPD Labs and images were reviewed. Patient Has recent ABG, which has been reviewed. Will treat underlying causes and adjust management of respiratory failure as follows: See COPD   Detail Level: Generalized Detail Level: Simple Detail Level: Detailed

## 2022-07-23 NOTE — PLAN OF CARE
Problem: Adult Inpatient Plan of Care  Goal: Plan of Care Review  Outcome: Ongoing, Progressing  Goal: Patient-Specific Goal (Individualized)  Outcome: Ongoing, Progressing  Goal: Absence of Hospital-Acquired Illness or Injury  Outcome: Ongoing, Progressing  Goal: Optimal Comfort and Wellbeing  Outcome: Ongoing, Progressing  Goal: Readiness for Transition of Care  Outcome: Ongoing, Progressing     Problem: Adjustment to Illness COPD (Chronic Obstructive Pulmonary Disease)  Goal: Optimal Chronic Illness Coping  Outcome: Ongoing, Progressing     Problem: Functional Ability Impaired COPD (Chronic Obstructive Pulmonary Disease)  Goal: Optimal Level of Functional [place]  Outcome: Ongoing, Progressing     Problem: Infection COPD (Chronic Obstructive Pulmonary Disease)  Goal: Absence of Infection Signs and Symptoms  Outcome: Ongoing, Progressing     Problem: Oral Intake Inadequate COPD (Chronic Obstructive Pulmonary Disease)  Goal: Improved Nutrition Intake  Outcome: Ongoing, Progressing     Problem: Respiratory Compromise COPD (Chronic Obstructive Pulmonary Disease)  Goal: Effective Oxygenation and Ventilation  Outcome: Ongoing, Progressing

## 2022-07-24 VITALS
WEIGHT: 129.19 LBS | SYSTOLIC BLOOD PRESSURE: 158 MMHG | HEIGHT: 67 IN | HEART RATE: 75 BPM | BODY MASS INDEX: 20.28 KG/M2 | TEMPERATURE: 98 F | DIASTOLIC BLOOD PRESSURE: 77 MMHG | RESPIRATION RATE: 17 BRPM | OXYGEN SATURATION: 93 %

## 2022-07-24 LAB
ANION GAP SERPL CALC-SCNC: 5 MMOL/L (ref 8–16)
BASOPHILS # BLD AUTO: 0 K/UL (ref 0–0.2)
BASOPHILS NFR BLD: 0 % (ref 0–1.9)
BUN SERPL-MCNC: 10 MG/DL (ref 6–20)
CALCIUM SERPL-MCNC: 9 MG/DL (ref 8.7–10.5)
CHLORIDE SERPL-SCNC: 98 MMOL/L (ref 95–110)
CO2 SERPL-SCNC: 33 MMOL/L (ref 23–29)
CREAT SERPL-MCNC: 0.7 MG/DL (ref 0.5–1.4)
DIFFERENTIAL METHOD: ABNORMAL
EOSINOPHIL # BLD AUTO: 0 K/UL (ref 0–0.5)
EOSINOPHIL NFR BLD: 0.1 % (ref 0–8)
ERYTHROCYTE [DISTWIDTH] IN BLOOD BY AUTOMATED COUNT: 18.7 % (ref 11.5–14.5)
EST. GFR  (AFRICAN AMERICAN): >60 ML/MIN/1.73 M^2
EST. GFR  (NON AFRICAN AMERICAN): >60 ML/MIN/1.73 M^2
GLUCOSE SERPL-MCNC: 79 MG/DL (ref 70–110)
HCT VFR BLD AUTO: 36.4 % (ref 40–54)
HGB BLD-MCNC: 11.2 G/DL (ref 14–18)
IMM GRANULOCYTES # BLD AUTO: 0.04 K/UL (ref 0–0.04)
IMM GRANULOCYTES NFR BLD AUTO: 0.4 % (ref 0–0.5)
LYMPHOCYTES # BLD AUTO: 0.8 K/UL (ref 1–4.8)
LYMPHOCYTES NFR BLD: 7.8 % (ref 18–48)
MAGNESIUM SERPL-MCNC: 1.9 MG/DL (ref 1.6–2.6)
MCH RBC QN AUTO: 22.4 PG (ref 27–31)
MCHC RBC AUTO-ENTMCNC: 30.8 G/DL (ref 32–36)
MCV RBC AUTO: 73 FL (ref 82–98)
MONOCYTES # BLD AUTO: 0.7 K/UL (ref 0.3–1)
MONOCYTES NFR BLD: 7 % (ref 4–15)
NEUTROPHILS # BLD AUTO: 9 K/UL (ref 1.8–7.7)
NEUTROPHILS NFR BLD: 84.7 % (ref 38–73)
NRBC BLD-RTO: 0 /100 WBC
PLATELET # BLD AUTO: 379 K/UL (ref 150–450)
PMV BLD AUTO: 9.5 FL (ref 9.2–12.9)
POTASSIUM SERPL-SCNC: 4.6 MMOL/L (ref 3.5–5.1)
RBC # BLD AUTO: 5.01 M/UL (ref 4.6–6.2)
SODIUM SERPL-SCNC: 136 MMOL/L (ref 136–145)
WBC # BLD AUTO: 10.59 K/UL (ref 3.9–12.7)

## 2022-07-24 PROCEDURE — S4991 NICOTINE PATCH NONLEGEND: HCPCS | Performed by: HOSPITALIST

## 2022-07-24 PROCEDURE — 94761 N-INVAS EAR/PLS OXIMETRY MLT: CPT

## 2022-07-24 PROCEDURE — 36415 COLL VENOUS BLD VENIPUNCTURE: CPT | Performed by: INTERNAL MEDICINE

## 2022-07-24 PROCEDURE — 94640 AIRWAY INHALATION TREATMENT: CPT

## 2022-07-24 PROCEDURE — 63600175 PHARM REV CODE 636 W HCPCS: Performed by: INTERNAL MEDICINE

## 2022-07-24 PROCEDURE — 25000242 PHARM REV CODE 250 ALT 637 W/ HCPCS: Performed by: INTERNAL MEDICINE

## 2022-07-24 PROCEDURE — 25000003 PHARM REV CODE 250: Performed by: INTERNAL MEDICINE

## 2022-07-24 PROCEDURE — 85025 COMPLETE CBC W/AUTO DIFF WBC: CPT | Performed by: INTERNAL MEDICINE

## 2022-07-24 PROCEDURE — 27000221 HC OXYGEN, UP TO 24 HOURS

## 2022-07-24 PROCEDURE — 25000003 PHARM REV CODE 250: Performed by: HOSPITALIST

## 2022-07-24 PROCEDURE — 83735 ASSAY OF MAGNESIUM: CPT | Performed by: INTERNAL MEDICINE

## 2022-07-24 PROCEDURE — 80048 BASIC METABOLIC PNL TOTAL CA: CPT | Performed by: INTERNAL MEDICINE

## 2022-07-24 RX ORDER — DOXYCYCLINE HYCLATE 100 MG
100 TABLET ORAL EVERY 12 HOURS
Qty: 6 TABLET | Refills: 0 | Status: SHIPPED | OUTPATIENT
Start: 2022-07-24 | End: 2022-07-27

## 2022-07-24 RX ORDER — ROFLUMILAST 500 UG/1
500 TABLET ORAL DAILY
Qty: 30 TABLET | Refills: 1 | Status: ON HOLD | OUTPATIENT
Start: 2022-07-24 | End: 2022-08-12 | Stop reason: HOSPADM

## 2022-07-24 RX ORDER — FERROUS SULFATE 325(65) MG
325 TABLET, DELAYED RELEASE (ENTERIC COATED) ORAL DAILY
Qty: 30 TABLET | Refills: 1 | Status: SHIPPED | OUTPATIENT
Start: 2022-07-24 | End: 2022-12-21 | Stop reason: SDUPTHER

## 2022-07-24 RX ORDER — NICOTINE 7MG/24HR
1 PATCH, TRANSDERMAL 24 HOURS TRANSDERMAL DAILY
Qty: 30 PATCH | Refills: 1 | Status: SHIPPED | OUTPATIENT
Start: 2022-07-24 | End: 2022-08-31

## 2022-07-24 RX ORDER — FUROSEMIDE 20 MG/1
20 TABLET ORAL DAILY
Qty: 30 TABLET | Refills: 11 | Status: ON HOLD | OUTPATIENT
Start: 2022-07-24 | End: 2022-08-12 | Stop reason: SDUPTHER

## 2022-07-24 RX ORDER — PREDNISONE 20 MG/1
40 TABLET ORAL DAILY
Qty: 6 TABLET | Refills: 0 | Status: SHIPPED | OUTPATIENT
Start: 2022-07-24 | End: 2022-07-27

## 2022-07-24 RX ORDER — ALBUTEROL SULFATE 90 UG/1
1-2 AEROSOL, METERED RESPIRATORY (INHALATION) EVERY 6 HOURS PRN
Qty: 8.5 G | Refills: 11 | Status: ON HOLD | OUTPATIENT
Start: 2022-07-24 | End: 2022-09-02 | Stop reason: SDUPTHER

## 2022-07-24 RX ORDER — BENZONATATE 100 MG/1
100 CAPSULE ORAL 3 TIMES DAILY PRN
Qty: 30 CAPSULE | Refills: 1 | Status: SHIPPED | OUTPATIENT
Start: 2022-07-24 | End: 2022-08-03

## 2022-07-24 RX ORDER — ROFLUMILAST 250 UG/1
250 TABLET ORAL DAILY
Qty: 30 TABLET | Refills: 0 | Status: SHIPPED | OUTPATIENT
Start: 2022-07-24 | End: 2022-08-23

## 2022-07-24 RX ADMIN — CALCIUM CARBONATE (ANTACID) CHEW TAB 500 MG 500 MG: 500 CHEW TAB at 01:07

## 2022-07-24 RX ADMIN — IPRATROPIUM BROMIDE AND ALBUTEROL SULFATE 3 ML: 2.5; .5 SOLUTION RESPIRATORY (INHALATION) at 08:07

## 2022-07-24 RX ADMIN — CALCIUM CARBONATE (ANTACID) CHEW TAB 500 MG 500 MG: 500 CHEW TAB at 07:07

## 2022-07-24 RX ADMIN — ROFLUMILAST 500 MCG: 500 TABLET ORAL at 09:07

## 2022-07-24 RX ADMIN — FLUTICASONE FUROATE AND VILANTEROL TRIFENATATE 1 PUFF: 100; 25 POWDER RESPIRATORY (INHALATION) at 08:07

## 2022-07-24 RX ADMIN — NICOTINE 1 PATCH: 7 PATCH, EXTENDED RELEASE TRANSDERMAL at 09:07

## 2022-07-24 RX ADMIN — PANTOPRAZOLE SODIUM 40 MG: 40 TABLET, DELAYED RELEASE ORAL at 09:07

## 2022-07-24 RX ADMIN — CARVEDILOL 12.5 MG: 12.5 TABLET, FILM COATED ORAL at 09:07

## 2022-07-24 RX ADMIN — PREDNISONE 40 MG: 20 TABLET ORAL at 09:07

## 2022-07-24 RX ADMIN — GUAIFENESIN AND DEXTROMETHORPHAN 10 ML: 100; 10 SYRUP ORAL at 05:07

## 2022-07-24 RX ADMIN — IPRATROPIUM BROMIDE AND ALBUTEROL SULFATE 3 ML: 2.5; .5 SOLUTION RESPIRATORY (INHALATION) at 04:07

## 2022-07-24 RX ADMIN — SENNOSIDES AND DOCUSATE SODIUM 1 TABLET: 50; 8.6 TABLET ORAL at 09:07

## 2022-07-24 RX ADMIN — FERROUS SULFATE TAB 325 MG (65 MG ELEMENTAL FE) 1 EACH: 325 (65 FE) TAB at 09:07

## 2022-07-24 RX ADMIN — DOXYCYCLINE HYCLATE 100 MG: 100 TABLET, COATED ORAL at 09:07

## 2022-07-24 NOTE — NURSING
Patient escorted to [facility] via wheelchair for discharge home. Patient escorted by [facility] transport with 1 Liter oxygen intact via nasal canula. No apparent distress noted.

## 2022-07-24 NOTE — NURSING
PER handoff received from DONNY Peralta     Pt resting in bed quietly. NAD noted. 1 L oxygen via nasal canula in use  Fall and safety precautions maintained. Bed alarm activated and audible.. Bed locked in lowest position, with side rails up x2. Call bell and personal items within reach

## 2022-07-24 NOTE — NURSING
"[facility] transportation arrived to unit with a oxygen tank with the gauge right outside the red line for low oxygen. Advised they should go and get another tank so the patient would not run out on the way home. Patient stated "lets just go, I do not wear it all the time and it goes in the red when im at home and im OK" Spoke to the "director" of [facility] transportation and she insured me that they had enough oxygen to get him home safely and someone was going to meet the drivers and give them a second tank on the way to the patients house. Advised that was not the best decision regarding the patients safety and the patient and [facility] service stated they would be fine and left. Charge nurse notified   "

## 2022-07-24 NOTE — NURSING
Nurses Note -- 4 Eyes      7/23/2022   7:14 PM      Skin assessed during:       [x] No Pressure Injuries Present    []Prevention Measures Documented      [] Yes- Altered Skin Integrity Present or Discovered   [] LDA Added if Not in Epic (Describe Wound)   [] New Altered Skin Integrity was Present on Admit and Documented in LDA   [] Wound Image Taken    Wound Care Consulted? No    Attending Nurse:  Carmen Dean LPN      Second RN/Staff Member: Zara Galloway RN

## 2022-07-24 NOTE — PLAN OF CARE
07/24/22 1003   Final Note   Assessment Type Final Discharge Note   Anticipated Discharge Disposition Home   Hospital Resources/Appts/Education Provided   (Unable to schedule due to weekend dc)   Post-Acute Status   Post-Acute Authorization Other   Other Status No Post-Acute Service Needs   Discharge Delays None known at this time  (Once seen by )

## 2022-07-24 NOTE — DISCHARGE SUMMARY
"[facility] Medicine  Discharge Summary      Patient Name: Baltazar Mccray  MRN: [id number]  Patient Class: IP- Inpatient  Admission Date: 7/22/2022  Hospital Length of Stay: 2 days  Discharge Date and Time: 7/24/2022  3:09 PM  Attending Physician: No att. providers found   Discharging Provider: Naeem Mccloud MD  Primary Care Provider: [facility]      HPI:   Mr. Mccray is a 61yo man with Afib, CHF, CAD and COPD.  His last TTE was 1/29/22 and showed EF 55%, grade I diastolic dysfunction, PAP 50, and normal RV function.     He now comes as a transfer from [facility] for capacity issues.  He tells me he has very frequent COPD exacerbations, but is usually able to control them at home on his BiPAP and with nebs.  Over the past 4 days he has noticed significant worsening of his SOB, especially at night.  He is having to wake up and cough, and can't get comfortable.  His cough is mostly dry, but with occasional green sputum.  He denies fever or chills.  He has no PND or leg swelling, and he states, "this doesn't feel like my heart."  He denies CP, N/V.  He does report some wheezing.    As noted by our , Dr. Herron:  62-year-old male with a history of atrial fibrillation, CHF, COPD (1 L nasal cannula at home), home BiPAP use, and coronary artery disease presented to [facility] Emergency Department on July 22 with worsening dyspnea.  In the emergency department his initial oxygen saturations were between 60 and 70%.  He noted over the past 3 weeks he has been more short of breath with difficulty sleeping.  He felt like his BiPAP was not helping.  No fever chills, no chest pain, and no nausea or vomiting.  He was found to be hyponatremic in the emergency department.  In the emergency department he received acetaminophen, nebulizer treatments, and Solu-Medrol.  Case discussed with the referring provider and with Hospital Medicine at [facility]. Referring provider noted " patient appeared stable.  Will plan transfer to Hospital Medicine at [facility] for further treatment of COPD exacerbation.     COVID vaccinated///COVID negative  Sodium 129 (137 on April 1, 2022), potassium 5, chloride 88, CO2 30, BUN 4, creatinine 0.5, glucose 71, AST 20, ALT 7, troponin 0.009, BNP 81, white blood cells 6.5, hemoglobin 13.5, hematocrit 42.9, platelets 437.  Venous pH 7.302.  Chest x-ray shows monitoring leads in place.  Normal heart size and pulmonary vessels.  Lungs are slightly hyperexpanded.  No confluent consolidation.  EKG showed sinus tachycardia with ventricular rate 117, right atrial enlargement.    Goals of Care Treatment Preferences:  Code Status: Full Code      Consults:   Consults (From admission, onward)        Status Ordering Provider     Inpatient consult to Social Work  Once        Provider:  (Not yet assigned)    Completed JAIMEE BALL     Inpatient consult to Pulmonology  Once        Provider:  (Not yet assigned)    Completed DANIELLA MEDELLIN        Hospital Course By Problem:   * Acute on chronic respiratory failure with hypoxia and hypercapnia  -Admitted to inpatient status  -Noted tachypnea, increased work of breathing and respiratory distress with wheezing on admit  -At home he is on 1L NC O2 and bipap qhs.  -CXR showed lungs are slightly hyperexpanded without confluent consolidation.  -On admit ABG showed a respiratory acidosis with pCO2 of 80.2  -This is due to COPD exacerbation  -This is his 7th hospitalization since 12/2021  -Treated with prednisone, scheduled nebulizers, roflumilast, fluticasone/vilanterol, doxycycline, supplemental o2 and bipap qhs  -His breathing has dramatically improved and he is asking to go home.  Continue short course of prednisone and doxy with other home copd medications.  -Added ASHWINI at home and OPCM at discharge in hopes of minimizing his admissions  -Placed pulmonology referral  -Follow up with pcp within 1 week.    COPD  exacerbation  -Treatment as above.    Acute bronchitis  -Treatment as above.    BiPAP (biphasic positive airway pressure) dependence  -His home BiPAP settings HS have been ordered  -He has no clinical signs of respiratory hypercapnia despite pCO2 of 80    Hyponatremia  -Na 129 on admit  -Appeared hypovolemic  -Given NS 500cc with normalization of Na    Microcytic anemia  -Hb 13.5 and now 12.1  -No evidence of bleeding  -He is iron deficient - Tsat 6%.  B12 replete.  Folate borderline low  -Started FeSo4    Chronic diastolic congestive heart failure  -Echo 1/29/22 showed EF 55%, grade I diastolic dysfunction, PAP 50, and normal RV function.   -BNP normal on admit and appeared hypovolemic  -Strict ins/outs and daily weights  -Held home lasix 20mg qd and got 500cc NS on admit  -Continue coreg and lasix at discharge  -Consider acei at some point    Atrial fibrillation  -Presently in NSR  -Continue home coreg  -[id number] Score: 1. Anticoagulation not indicated.    Essential hypertension  -BP moderately elevated at times  -Continue home Coreg    Alcohol use disorder, mild, abuse  -History noted  -No signs of EtOh withdrawal  -Stable currently to monitor    Tobacco abuse  -Counselled on cessation 8 minutes.  -NRT ordered.      Final Active Diagnoses:    Diagnosis Date Noted POA    PRINCIPAL PROBLEM:  Acute on chronic respiratory failure with hypoxia and hypercapnia [J96.21, J96.22] 01/29/2020 Yes    COPD exacerbation [J44.1] 05/14/2017 Yes    Acute bronchitis [J20.9] 07/22/2022 Yes    BiPAP (biphasic positive airway pressure) dependence [Z99.89]  Not Applicable    Hyponatremia [E87.1] 07/22/2022 Yes    Microcytic anemia [D50.9] 07/22/2022 Yes    Chronic diastolic congestive heart failure [I50.32] 04/19/2020 Yes    Atrial fibrillation [I48.91] 03/05/2022 Yes    Essential hypertension [I10] 05/19/2017 Yes     Chronic    Alcohol use disorder, mild, abuse [F10.10]  Yes     Chronic    Tobacco abuse [Z72.0]  08/12/2018 Yes     Chronic      Problems Resolved During this Admission:    Diagnosis Date Noted Date Resolved POA    Shortness of breath [R06.02] 07/22/2022 07/22/2022 Unknown       Discharged Condition: stable    Disposition: Home or Self Care    Follow Up:   Follow-up Information     [facility]. Schedule an appointment as soon as possible for a visit in 1 week(s).    Why: for Hospital Follow up  Contact information:  [address]  [place] [address]  [phone]             Tarah Patel MD. Schedule an appointment as soon as possible for a visit in 2 week(s).    Specialty: Pulmonary Disease  Why: For Pulmonary follow up  Contact information:  [address] [address]  [phone]                       Patient Instructions:      Ambulatory referral/consult to Pulmonology   Standing Status: Future   Referral Priority: Routine Referral Type: Consultation   Referral Reason: Specialty Services Required   Requested Specialty: Pulmonary Disease     Ambulatory referral/consult to [facility]   Standing Status: Future   Referral Priority: Routine Referral Type: Consultation   Referral Reason: Specialty Services Required   Number of Visits Requested: 1     Ambulatory referral/consult to Outpatient Case Management   Referral Priority: Routine Referral Type: Consultation   Referral Reason: Specialty Services Required   Number of Visits Requested: 1     Diet Cardiac     Notify your health care provider if you experience any of the following:  increased confusion or weakness     Notify your health care provider if you experience any of the following:  persistent dizziness, light-headedness, or visual disturbances     Notify your health care provider if you experience any of the following:  worsening rash     Notify your health care provider if you experience any of the following:  severe persistent headache     Notify your health care provider if  you experience any of the following:  difficulty breathing or increased cough     Notify your health care provider if you experience any of the following:  severe uncontrolled pain     Notify your health care provider if you experience any of the following:  persistent nausea and vomiting or diarrhea     Notify your health care provider if you experience any of the following:  temperature >100.4     Activity as tolerated       Significant Diagnostic Studies: Labs:   BMP:   Recent Labs   Lab 07/23/22  0503 07/24/22  0441   * 79   * 136   K 5.1 4.6   CL 97 98   CO2 30* 33*   BUN 10 10   CREATININE 0.7 0.7   CALCIUM 9.4 9.0   MG 1.8 1.9   , CMP   Recent Labs   Lab 07/23/22  0503 07/24/22  0441   * 136   K 5.1 4.6   CL 97 98   CO2 30* 33*   * 79   BUN 10 10   CREATININE 0.7 0.7   CALCIUM 9.4 9.0   ANIONGAP 5* 5*   ESTGFRAFRICA >60 >60   EGFRNONAA >60 >60   , CBC   Recent Labs   Lab 07/23/22  0503 07/24/22  0441   WBC 3.55* 10.59   HGB 12.1* 11.2*   HCT 39.0* 36.4*    379   , INR   Lab Results   Component Value Date    INR 1.0 03/14/2022    INR 1.0 04/19/2020    INR 1.0 01/03/2020   , Lipid Panel   Lab Results   Component Value Date    CHOL 260 (H) 04/19/2020    HDL 91 (H) 04/19/2020    LDLCALC 140.0 04/19/2020    TRIG 145 04/19/2020    CHOLHDL 35.0 04/19/2020   , Troponin   Recent Labs   Lab 07/22/22  0937   TROPONINI 0.009    and A1C:   Recent Labs   Lab 02/10/22  0345   HGBA1C 5.7*       Pending Diagnostic Studies:     Procedure Component Value Units Date/Time    Hepatitis C Antibody [[id number]] Collected: 07/22/22 1010    Order Status: Sent Lab Status: In process Updated: 07/22/22 1013    Specimen: Blood          Medications:  Reconciled Home Medications:      Medication List      START taking these medications    benzonatate 100 MG capsule  Commonly known as: TESSALON  Take 1 capsule (100 mg total) by mouth 3 (three) times daily as needed for Cough.     doxycycline 100 MG  tablet  Commonly known as: VIBRA-TABS  Take 1 tablet (100 mg total) by mouth every 12 (twelve) hours. for 3 days     ferrous sulfate 325 (65 FE) MG EC tablet  Take 1 tablet (325 mg total) by mouth once daily.     nicotine 7 mg/24 hr  Commonly known as: NICODERM CQ  Place 1 patch onto the skin once daily.     predniSONE 20 MG tablet  Commonly known as: DELTASONE  Take 2 tablets (40 mg total) by mouth once daily. for 3 days        CHANGE how you take these medications    * DALIRESP 500 mcg Tab  Generic drug: roflumilast  Start this after completing 1 month of 250mg daily. Take 1 tablet (500 mcg total) by mouth once daily.  What changed: Another medication with the same name was changed. Make sure you understand how and when to take each.     * roflumilast 250 mcg Tab  Start this first. Take 1 tablet (250 mcg total) by mouth once daily.  What changed: when to take this         * This list has 2 medication(s) that are the same as other medications prescribed for you. Read the directions carefully, and ask your doctor or other care provider to review them with you.            CONTINUE taking these medications    acetaminophen 325 MG tablet  Commonly known as: TYLENOL  Take 2 tablets (650 mg total) by mouth every 8 (eight) hours as needed for Pain or Temperature greater than (100.5).     albuterol 90 mcg/actuation inhaler  Commonly known as: PROVENTIL/VENTOLIN HFA  Inhale 1-2 puffs into the lungs every 6 (six) hours as needed for Wheezing. Rescue     albuterol-ipratropium 2.5 mg-0.5 mg/3 mL nebulizer solution  Commonly known as: DUO-NEB  Take 3 mLs by nebulization every 4 (four) hours as needed for Wheezing or Shortness of Breath. Rescue     BREZTRI AEROSPHERE 160-9-4.8 mcg/actuation Hfaa  Generic drug: budesonide-glycopyr-formoterol  Inhale 2 puffs into the lungs 2 (two) times daily. Rinse mouth after use. CONTROL INHALER     carvediloL 12.5 MG tablet  Commonly known as: COREG  Take 1 tablet (12.5 mg total) by mouth 2  (two) times daily with meals.     furosemide 20 MG tablet  Commonly known as: LASIX  Take 1 tablet (20 mg total) by mouth once daily.     pantoprazole 40 MG tablet  Commonly known as: PROTONIX  Take 1 tablet (40 mg total) by mouth 2 (two) times daily.        STOP taking these medications    SPIRIVA RESPIMAT 2.5 mcg/actuation inhaler  Generic drug: tiotropium bromide            Indwelling Lines/Drains at time of discharge:   Lines/Drains/Airways     None                 Time spent on the discharge of patient: 35 minutes         Naeem Mccloud MD  Department of Hospital Medicine  [facility]

## 2022-07-24 NOTE — PLAN OF CARE
Pt is AAOx4. 1L NC. Tele maintained.   No falls or new injuries reported during shift, safety precautions maintained.     Problem: Adult Inpatient Plan of Care  Goal: Plan of Care Review  Outcome: Ongoing, Progressing     Problem: Adult Inpatient Plan of Care  Goal: Optimal Comfort and Wellbeing  Outcome: Ongoing, Progressing

## 2022-07-24 NOTE — PROGRESS NOTES
Medication List        START taking these medications      benzonatate 100 MG capsule  Commonly known as: TESSALON  Take 1 capsule (100 mg total) by mouth 3 (three) times daily as needed for Cough.     doxycycline 100 MG tablet  Commonly known as: VIBRA-TABS  Take 1 tablet (100 mg total) by mouth every 12 (twelve) hours. for 3 days     ferrous sulfate 325 (65 FE) MG EC tablet  Take 1 tablet (325 mg total) by mouth once daily.     nicotine 7 mg/24 hr  Commonly known as: NICODERM CQ  Place 1 patch onto the skin once daily.     predniSONE 20 MG tablet  Commonly known as: DELTASONE  Take 2 tablets (40 mg total) by mouth once daily. for 3 days            CHANGE how you take these medications      * DALIRESP 500 mcg Tab  Generic drug: roflumilast  Start this after completing 1 month of 250mg daily. Take 1 tablet (500 mcg total) by mouth once daily.  What changed: Another medication with the same name was changed. Make sure you understand how and when to take each.     * roflumilast 250 mcg Tab  Start this first. Take 1 tablet (250 mcg total) by mouth once daily.  What changed: when to take this           * This list has 2 medication(s) that are the same as other medications prescribed for you. Read the directions carefully, and ask your doctor or other care provider to review them with you.                CONTINUE taking these medications      acetaminophen 325 MG tablet  Commonly known as: TYLENOL  Take 2 tablets (650 mg total) by mouth every 8 (eight) hours as needed for Pain or Temperature greater than (100.5).     albuterol 90 mcg/actuation inhaler  Commonly known as: PROVENTIL/VENTOLIN HFA  Inhale 1-2 puffs into the lungs every 6 (six) hours as needed for Wheezing. Rescue     albuterol-ipratropium 2.5 mg-0.5 mg/3 mL nebulizer solution  Commonly known as: DUO-NEB  Take 3 mLs by nebulization every 4 (four) hours as needed for Wheezing or Shortness of Breath. Rescue     BREZTRI AEROSPHERE 160-9-4.8 mcg/actuation  Hfaa  Generic drug: budesonide-glycopyr-formoterol  Inhale 2 puffs into the lungs 2 (two) times daily. Rinse mouth after use. CONTROL INHALER     carvediloL 12.5 MG tablet  Commonly known as: COREG  Take 1 tablet (12.5 mg total) by mouth 2 (two) times daily with meals.     furosemide 20 MG tablet  Commonly known as: LASIX  Take 1 tablet (20 mg total) by mouth once daily.     pantoprazole 40 MG tablet  Commonly known as: PROTONIX  Take 1 tablet (40 mg total) by mouth 2 (two) times daily.            STOP taking these medications      SPIRIVA RESPIMAT 2.5 mcg/actuation inhaler  Generic drug: tiotropium bromide               Where to Get Your Medications        These medications were sent to [facility] [address] [address]      Hours: Mon-Fri 7a-7p, Sat-Sun 10a-4p Phone: [phone]   albuterol 90 mcg/actuation inhaler  benzonatate 100 MG capsule  DALIRESP 500 mcg Tab  doxycycline 100 MG tablet  ferrous sulfate 325 (65 FE) MG EC tablet  furosemide 20 MG tablet  nicotine 7 mg/24 hr  predniSONE 20 MG tablet  roflumilast 250 mcg Tab

## 2022-07-24 NOTE — PLAN OF CARE
Problem: Adult Inpatient Plan of Care  Goal: Plan of Care Review  Outcome: Met  Goal: Patient-Specific Goal (Individualized)  Outcome: Met  Goal: Absence of Hospital-Acquired Illness or Injury  Outcome: Met  Goal: Optimal Comfort and Wellbeing  Outcome: Met  Goal: Readiness for Transition of Care  Outcome: Met     Problem: Adjustment to Illness COPD (Chronic Obstructive Pulmonary Disease)  Goal: Optimal Chronic Illness Coping  Outcome: Met     Problem: Functional Ability Impaired COPD (Chronic Obstructive Pulmonary Disease)  Goal: Optimal Level of Functional [place]  Outcome: Met     Problem: Infection COPD (Chronic Obstructive Pulmonary Disease)  Goal: Absence of Infection Signs and Symptoms  Outcome: Met     Problem: Oral Intake Inadequate COPD (Chronic Obstructive Pulmonary Disease)  Goal: Improved Nutrition Intake  Outcome: Met     Problem: Respiratory Compromise COPD (Chronic Obstructive Pulmonary Disease)  Goal: Effective Oxygenation and Ventilation  Outcome: Met

## 2022-07-24 NOTE — PROGRESS NOTES
Nurse Zuñiga notified that the patient is clear for dc, from a cm stand point once seen by . Nursing to order cab for transport home.

## 2022-07-24 NOTE — CONSULTS
Unable to schedule due to Sunday dc. Information will be given to patient to self schedule. Dr. Diaz notified via secure chat.

## 2022-07-25 LAB — HCV AB SERPL QL IA: NEGATIVE

## 2022-07-26 ENCOUNTER — PATIENT OUTREACH (OUTPATIENT)
Dept: ADMINISTRATIVE | Facility: CLINIC | Age: 62
End: 2022-07-26
Payer: MEDICARE

## 2022-07-26 DIAGNOSIS — J40 BRONCHITIS: Primary | ICD-10-CM

## 2022-07-26 NOTE — PROGRESS NOTES
C3 nurse attempted to contact Baltzaar Mccray for a TCC post hospital discharge follow up call. No answer. No voicemail available.The patient does not have a scheduled HOSFU appointment.   [facility] PCP.

## 2022-07-26 NOTE — PROGRESS NOTES
C3 nurse attempted to contact Baltazar Mccray for a TCC post hospital discharge follow up call. No answer. No voicemail available.The patient does not have a scheduled HOSFU appointment.   [facility] PCP.

## 2022-07-27 NOTE — PROGRESS NOTES
C3 nurse attempted to contact Baltazar Mccray for a TCC post hospital discharge follow up call. Spoke w/ Sister. Callback number provided for patient to return call. The patient does not have a scheduled HOSFU appointment.   Non-[facility] PCP.

## 2022-07-27 NOTE — PROGRESS NOTES
C3 nurse spoke with Baltazar Mccray for a TCC post hospital discharge follow up call. The patient does not have a scheduled HOSFU appointment with Jaden within 5-7 days post hospital discharge date 07/24/2022. C3 nurse was unable to schedule HOSFU appointment in [facility].    Non-[facility] PCP. NP referral placed.      English

## 2022-08-01 ENCOUNTER — PES CALL (OUTPATIENT)
Dept: ADMINISTRATIVE | Facility: CLINIC | Age: 62
End: 2022-08-01
Payer: MEDICARE

## 2022-08-02 ENCOUNTER — PES CALL (OUTPATIENT)
Dept: ADMINISTRATIVE | Facility: CLINIC | Age: 62
End: 2022-08-02
Payer: MEDICARE

## 2022-08-03 ENCOUNTER — PES CALL (OUTPATIENT)
Dept: ADMINISTRATIVE | Facility: CLINIC | Age: 62
End: 2022-08-03
Payer: MEDICARE

## 2022-08-10 ENCOUNTER — HOSPITAL ENCOUNTER (OUTPATIENT)
Facility: HOSPITAL | Age: 62
Discharge: HOME | End: 2022-08-12
Attending: EMERGENCY MEDICINE | Admitting: STUDENT IN AN ORGANIZED HEALTH CARE EDUCATION/TRAINING PROGRAM
Payer: MEDICARE

## 2022-08-10 DIAGNOSIS — J96.01 ACUTE RESPIRATORY FAILURE WITH HYPOXIA AND HYPERCARBIA: ICD-10-CM

## 2022-08-10 DIAGNOSIS — Z72.0 TOBACCO ABUSE: Chronic | ICD-10-CM

## 2022-08-10 DIAGNOSIS — J96.22 ACUTE ON CHRONIC RESPIRATORY FAILURE WITH HYPOXIA AND HYPERCAPNIA: ICD-10-CM

## 2022-08-10 DIAGNOSIS — I50.9 HEART FAILURE: ICD-10-CM

## 2022-08-10 DIAGNOSIS — J96.21 ACUTE ON CHRONIC RESPIRATORY FAILURE WITH HYPOXIA AND HYPERCAPNIA: ICD-10-CM

## 2022-08-10 DIAGNOSIS — J96.02 ACUTE RESPIRATORY FAILURE WITH HYPOXIA AND HYPERCARBIA: ICD-10-CM

## 2022-08-10 DIAGNOSIS — J44.1 COPD EXACERBATION: Primary | ICD-10-CM

## 2022-08-10 DIAGNOSIS — R06.02 SOB (SHORTNESS OF BREATH): ICD-10-CM

## 2022-08-10 DIAGNOSIS — R06.02 SHORTNESS OF BREATH: ICD-10-CM

## 2022-08-10 LAB
ALBUMIN SERPL BCP-MCNC: 3.1 G/DL (ref 3.5–5.2)
ALLENS TEST: ABNORMAL
ALLENS TEST: ABNORMAL
ALP SERPL-CCNC: 97 U/L (ref 55–135)
ALT SERPL W/O P-5'-P-CCNC: 13 U/L (ref 10–44)
ANION GAP SERPL CALC-SCNC: 15 MMOL/L (ref 8–16)
AST SERPL-CCNC: 15 U/L (ref 10–40)
BASOPHILS # BLD AUTO: 0.9 K/UL (ref 0–0.2)
BASOPHILS NFR BLD: 1 % (ref 0–1.9)
BILIRUB SERPL-MCNC: 0.5 MG/DL (ref 0.1–1)
BNP SERPL-MCNC: 926 PG/ML (ref 0–99)
BUN SERPL-MCNC: 6 MG/DL (ref 6–20)
CALCIUM SERPL-MCNC: 8.6 MG/DL (ref 8.7–10.5)
CHLORIDE SERPL-SCNC: 95 MMOL/L (ref 95–110)
CO2 SERPL-SCNC: 28 MMOL/L (ref 23–29)
CREAT SERPL-MCNC: 0.9 MG/DL (ref 0.5–1.4)
DELSYS: ABNORMAL
DELSYS: ABNORMAL
DIFFERENTIAL METHOD: ABNORMAL
EOSINOPHIL # BLD AUTO: 0.1 K/UL (ref 0–0.5)
EOSINOPHIL NFR BLD: 1.1 % (ref 0–8)
EP: 5
EP: 5
ERYTHROCYTE [DISTWIDTH] IN BLOOD BY AUTOMATED COUNT: 21.1 % (ref 11.5–14.5)
ERYTHROCYTE [SEDIMENTATION RATE] IN BLOOD BY WESTERGREN METHOD: 12 MM/H
ERYTHROCYTE [SEDIMENTATION RATE] IN BLOOD BY WESTERGREN METHOD: 14 MM/H
EST. GFR  (NO RACE VARIABLE): >60 ML/MIN/1.73 M^2
FIO2: 28
FIO2: 28
GLUCOSE SERPL-MCNC: 83 MG/DL (ref 70–110)
HCO3 UR-SCNC: 31.6 MMOL/L (ref 24–28)
HCO3 UR-SCNC: 37.7 MMOL/L (ref 24–28)
HCT VFR BLD AUTO: 41.2 % (ref 40–54)
HGB BLD-MCNC: 12.8 G/DL (ref 14–18)
IMM GRANULOCYTES # BLD AUTO: 0.03 K/UL (ref 0–0.04)
IMM GRANULOCYTES NFR BLD AUTO: 0.3 % (ref 0–0.5)
IP: 10
IP: 14
LYMPHOCYTES # BLD AUTO: 2 K/UL (ref 1–4.8)
LYMPHOCYTES NFR BLD: 22.4 % (ref 18–48)
MCH RBC QN AUTO: 22.5 PG (ref 27–31)
MCHC RBC AUTO-ENTMCNC: 31.1 G/DL (ref 32–36)
MCV RBC AUTO: 72 FL (ref 82–98)
MIN VOL: 14.9
MIN VOL: 14.9
MODE: ABNORMAL
MODE: ABNORMAL
MONOCYTES # BLD AUTO: 0.6 K/UL (ref 0.3–1)
MONOCYTES NFR BLD: 6.6 % (ref 4–15)
NEUTROPHILS # BLD AUTO: 6.1 K/UL (ref 1.8–7.7)
NEUTROPHILS NFR BLD: 68.6 % (ref 38–73)
NRBC BLD-RTO: 0 /100 WBC
PCO2 BLDA: 55.5 MMHG (ref 35–45)
PCO2 BLDA: 76.8 MMHG (ref 35–45)
PH SMN: 7.3 [PH] (ref 7.35–7.45)
PH SMN: 7.36 [PH] (ref 7.35–7.45)
PLATELET # BLD AUTO: 404 K/UL (ref 150–450)
PMV BLD AUTO: 8.9 FL (ref 9.2–12.9)
PO2 BLDA: 24 MMHG (ref 40–60)
PO2 BLDA: 57 MMHG (ref 40–60)
POC BE: 11 MMOL/L
POC BE: 6 MMOL/L
POC SATURATED O2: 35 % (ref 95–100)
POC SATURATED O2: 88 % (ref 95–100)
POC TCO2: 33 MMOL/L (ref 24–29)
POC TCO2: 40 MMOL/L (ref 24–29)
POTASSIUM SERPL-SCNC: 3.7 MMOL/L (ref 3.5–5.1)
PROT SERPL-MCNC: 7.2 G/DL (ref 6–8.4)
RBC # BLD AUTO: 5.7 M/UL (ref 4.6–6.2)
SAMPLE: ABNORMAL
SAMPLE: ABNORMAL
SARS-COV-2 RDRP RESP QL NAA+PROBE: NEGATIVE
SITE: ABNORMAL
SITE: ABNORMAL
SODIUM SERPL-SCNC: 138 MMOL/L (ref 136–145)
SP02: 93
SP02: 95
SPONT RATE: 29
SPONT RATE: 31
WBC # BLD AUTO: 8.93 K/UL (ref 3.9–12.7)

## 2022-08-10 PROCEDURE — 94644 CONT INHLJ TX 1ST HOUR: CPT

## 2022-08-10 PROCEDURE — U0002 COVID-19 LAB TEST NON-CDC: HCPCS

## 2022-08-10 PROCEDURE — 25000003 PHARM REV CODE 250: Performed by: HOSPITALIST

## 2022-08-10 PROCEDURE — 93010 EKG 12-LEAD: ICD-10-PCS | Mod: ,,, | Performed by: INTERNAL MEDICINE

## 2022-08-10 PROCEDURE — 99291 CRITICAL CARE FIRST HOUR: CPT | Mod: GC,,, | Performed by: EMERGENCY MEDICINE

## 2022-08-10 PROCEDURE — 99291 CRITICAL CARE FIRST HOUR: CPT | Mod: 25

## 2022-08-10 PROCEDURE — 96372 THER/PROPH/DIAG INJ SC/IM: CPT | Mod: 59 | Performed by: HOSPITALIST

## 2022-08-10 PROCEDURE — 96366 THER/PROPH/DIAG IV INF ADDON: CPT

## 2022-08-10 PROCEDURE — 96365 THER/PROPH/DIAG IV INF INIT: CPT

## 2022-08-10 PROCEDURE — 27000190 HC CPAP FULL FACE MASK W/VALVE

## 2022-08-10 PROCEDURE — 85025 COMPLETE CBC W/AUTO DIFF WBC: CPT

## 2022-08-10 PROCEDURE — 99900031 HC PATIENT EDUCATION (STAT)

## 2022-08-10 PROCEDURE — 27000221 HC OXYGEN, UP TO 24 HOURS

## 2022-08-10 PROCEDURE — 93005 ELECTROCARDIOGRAM TRACING: CPT

## 2022-08-10 PROCEDURE — 96361 HYDRATE IV INFUSION ADD-ON: CPT

## 2022-08-10 PROCEDURE — 96374 THER/PROPH/DIAG INJ IV PUSH: CPT | Mod: 59

## 2022-08-10 PROCEDURE — 63600175 PHARM REV CODE 636 W HCPCS: Performed by: HOSPITALIST

## 2022-08-10 PROCEDURE — 99900035 HC TECH TIME PER 15 MIN (STAT)

## 2022-08-10 PROCEDURE — 80053 COMPREHEN METABOLIC PANEL: CPT

## 2022-08-10 PROCEDURE — 99291 PR CRITICAL CARE, E/M 30-74 MINUTES: ICD-10-PCS | Mod: GC,,, | Performed by: EMERGENCY MEDICINE

## 2022-08-10 PROCEDURE — 83880 ASSAY OF NATRIURETIC PEPTIDE: CPT

## 2022-08-10 PROCEDURE — 82803 BLOOD GASES ANY COMBINATION: CPT | Mod: 59

## 2022-08-10 PROCEDURE — 94640 AIRWAY INHALATION TREATMENT: CPT

## 2022-08-10 PROCEDURE — G0378 HOSPITAL OBSERVATION PER HR: HCPCS

## 2022-08-10 PROCEDURE — 25000242 PHARM REV CODE 250 ALT 637 W/ HCPCS

## 2022-08-10 PROCEDURE — 94660 CPAP INITIATION&MGMT: CPT

## 2022-08-10 PROCEDURE — 94761 N-INVAS EAR/PLS OXIMETRY MLT: CPT

## 2022-08-10 PROCEDURE — 63600175 PHARM REV CODE 636 W HCPCS

## 2022-08-10 PROCEDURE — 93010 ELECTROCARDIOGRAM REPORT: CPT | Mod: ,,, | Performed by: INTERNAL MEDICINE

## 2022-08-10 RX ORDER — IPRATROPIUM BROMIDE AND ALBUTEROL SULFATE 2.5; .5 MG/3ML; MG/3ML
3 SOLUTION RESPIRATORY (INHALATION)
Status: COMPLETED | OUTPATIENT
Start: 2022-08-10 | End: 2022-08-10

## 2022-08-10 RX ORDER — NALOXONE HCL 0.4 MG/ML
0.02 VIAL (ML) INJECTION
Status: DISCONTINUED | OUTPATIENT
Start: 2022-08-10 | End: 2022-08-12 | Stop reason: HOSPADM

## 2022-08-10 RX ORDER — ENOXAPARIN SODIUM 100 MG/ML
40 INJECTION SUBCUTANEOUS EVERY 24 HOURS
Status: DISCONTINUED | OUTPATIENT
Start: 2022-08-10 | End: 2022-08-12 | Stop reason: HOSPADM

## 2022-08-10 RX ORDER — FUROSEMIDE 20 MG/1
20 TABLET ORAL DAILY
Status: DISCONTINUED | OUTPATIENT
Start: 2022-08-11 | End: 2022-08-10

## 2022-08-10 RX ORDER — IPRATROPIUM BROMIDE AND ALBUTEROL SULFATE 2.5; .5 MG/3ML; MG/3ML
3 SOLUTION RESPIRATORY (INHALATION) EVERY 6 HOURS
Status: DISCONTINUED | OUTPATIENT
Start: 2022-08-11 | End: 2022-08-12 | Stop reason: HOSPADM

## 2022-08-10 RX ORDER — SODIUM CHLORIDE 0.9 % (FLUSH) 0.9 %
10 SYRINGE (ML) INJECTION
Status: DISCONTINUED | OUTPATIENT
Start: 2022-08-10 | End: 2022-08-12 | Stop reason: HOSPADM

## 2022-08-10 RX ORDER — MAGNESIUM SULFATE HEPTAHYDRATE 40 MG/ML
2 INJECTION, SOLUTION INTRAVENOUS ONCE
Status: COMPLETED | OUTPATIENT
Start: 2022-08-10 | End: 2022-08-10

## 2022-08-10 RX ORDER — IBUPROFEN 200 MG
16 TABLET ORAL
Status: DISCONTINUED | OUTPATIENT
Start: 2022-08-10 | End: 2022-08-12 | Stop reason: HOSPADM

## 2022-08-10 RX ORDER — METHYLPREDNISOLONE SOD SUCC 125 MG
125 VIAL (EA) INJECTION
Status: COMPLETED | OUTPATIENT
Start: 2022-08-10 | End: 2022-08-10

## 2022-08-10 RX ORDER — ONDANSETRON 2 MG/ML
4 INJECTION INTRAMUSCULAR; INTRAVENOUS EVERY 8 HOURS PRN
Status: DISCONTINUED | OUTPATIENT
Start: 2022-08-10 | End: 2022-08-12 | Stop reason: HOSPADM

## 2022-08-10 RX ORDER — ROFLUMILAST 500 UG/1
500 TABLET ORAL DAILY
Status: DISCONTINUED | OUTPATIENT
Start: 2022-08-11 | End: 2022-08-12 | Stop reason: HOSPADM

## 2022-08-10 RX ORDER — TALC
6 POWDER (GRAM) TOPICAL NIGHTLY PRN
Status: DISCONTINUED | OUTPATIENT
Start: 2022-08-10 | End: 2022-08-10

## 2022-08-10 RX ORDER — CARVEDILOL 12.5 MG/1
12.5 TABLET ORAL 2 TIMES DAILY WITH MEALS
Status: DISCONTINUED | OUTPATIENT
Start: 2022-08-11 | End: 2022-08-12 | Stop reason: HOSPADM

## 2022-08-10 RX ORDER — ACETAMINOPHEN 325 MG/1
650 TABLET ORAL EVERY 4 HOURS PRN
Status: DISCONTINUED | OUTPATIENT
Start: 2022-08-10 | End: 2022-08-12 | Stop reason: HOSPADM

## 2022-08-10 RX ORDER — IBUPROFEN 200 MG
24 TABLET ORAL
Status: DISCONTINUED | OUTPATIENT
Start: 2022-08-10 | End: 2022-08-12 | Stop reason: HOSPADM

## 2022-08-10 RX ORDER — TALC
6 POWDER (GRAM) TOPICAL NIGHTLY PRN
Status: DISCONTINUED | OUTPATIENT
Start: 2022-08-10 | End: 2022-08-12 | Stop reason: HOSPADM

## 2022-08-10 RX ORDER — FUROSEMIDE 20 MG/1
40 TABLET ORAL DAILY
Status: DISCONTINUED | OUTPATIENT
Start: 2022-08-11 | End: 2022-08-12 | Stop reason: HOSPADM

## 2022-08-10 RX ORDER — IPRATROPIUM BROMIDE AND ALBUTEROL SULFATE 2.5; .5 MG/3ML; MG/3ML
3 SOLUTION RESPIRATORY (INHALATION) EVERY 6 HOURS PRN
Status: DISCONTINUED | OUTPATIENT
Start: 2022-08-10 | End: 2022-08-10

## 2022-08-10 RX ORDER — PROCHLORPERAZINE EDISYLATE 5 MG/ML
5 INJECTION INTRAMUSCULAR; INTRAVENOUS EVERY 6 HOURS PRN
Status: DISCONTINUED | OUTPATIENT
Start: 2022-08-10 | End: 2022-08-12 | Stop reason: HOSPADM

## 2022-08-10 RX ORDER — ALBUTEROL SULFATE 2.5 MG/.5ML
10 SOLUTION RESPIRATORY (INHALATION) CONTINUOUS
Status: DISPENSED | OUTPATIENT
Start: 2022-08-10 | End: 2022-08-10

## 2022-08-10 RX ORDER — PANTOPRAZOLE SODIUM 40 MG/1
40 TABLET, DELAYED RELEASE ORAL 2 TIMES DAILY
Status: DISCONTINUED | OUTPATIENT
Start: 2022-08-10 | End: 2022-08-12 | Stop reason: HOSPADM

## 2022-08-10 RX ORDER — LANOLIN ALCOHOL/MO/W.PET/CERES
1 CREAM (GRAM) TOPICAL DAILY
Refills: 1 | Status: DISCONTINUED | OUTPATIENT
Start: 2022-08-11 | End: 2022-08-12 | Stop reason: HOSPADM

## 2022-08-10 RX ORDER — ALBUTEROL SULFATE 2.5 MG/.5ML
2.5 SOLUTION RESPIRATORY (INHALATION) EVERY 4 HOURS PRN
Status: DISCONTINUED | OUTPATIENT
Start: 2022-08-10 | End: 2022-08-10

## 2022-08-10 RX ORDER — MAGNESIUM SULFATE HEPTAHYDRATE 40 MG/ML
2 INJECTION, SOLUTION INTRAVENOUS ONCE
Status: COMPLETED | OUTPATIENT
Start: 2022-08-10 | End: 2022-08-11

## 2022-08-10 RX ORDER — PREDNISONE 20 MG/1
40 TABLET ORAL DAILY
Status: DISCONTINUED | OUTPATIENT
Start: 2022-08-11 | End: 2022-08-12 | Stop reason: HOSPADM

## 2022-08-10 RX ADMIN — IPRATROPIUM BROMIDE AND ALBUTEROL SULFATE 3 ML: .5; 3 SOLUTION RESPIRATORY (INHALATION) at 10:08

## 2022-08-10 RX ADMIN — ENOXAPARIN SODIUM 40 MG: 100 INJECTION SUBCUTANEOUS at 09:08

## 2022-08-10 RX ADMIN — PANTOPRAZOLE SODIUM 40 MG: 40 TABLET, DELAYED RELEASE ORAL at 09:08

## 2022-08-10 RX ADMIN — METHYLPREDNISOLONE SODIUM SUCCINATE 125 MG: 125 INJECTION, POWDER, FOR SOLUTION INTRAMUSCULAR; INTRAVENOUS at 09:08

## 2022-08-10 RX ADMIN — MAGNESIUM SULFATE HEPTAHYDRATE 2 G: 40 INJECTION, SOLUTION INTRAVENOUS at 10:08

## 2022-08-10 RX ADMIN — ALBUTEROL SULFATE 10 MG: 2.5 SOLUTION RESPIRATORY (INHALATION) at 11:08

## 2022-08-10 RX ADMIN — MAGNESIUM SULFATE 2 G: 2 INJECTION INTRAVENOUS at 02:08

## 2022-08-10 NOTE — PLAN OF CARE
"Initial Discharge Planning Case Management Assessment:    Patient admitted on 8-10-22  Chart reviewed  Care plan discussed with treatment team  attending Dr Mcclendon  PCP updated in Epic: yes    DME at home: o-2 and bi-pap (self reports it needs to be repaired/assessed- "not working right"  Current dispo: pending  Assessed in the e/d, bed 9  Consults following: case mgt  Case management  to follow    [facility] - Emergency Dept  Initial Discharge Assessment       [facility] Provider: [facility]    Admission Diagnosis: No admission diagnoses are documented for this encounter.    Admission Date: 8/10/2022  Expected Discharge Date:     Discharge Barriers Identified: (P) None    Payor: HU[employer] MANAGED MEDICARE / Plan: HUMANA SNP (SPECIAL NEEDS PLAN) / Product Type: Medicare Advantage /     Extended Emergency Contact Information  Primary Emergency Contact: Gladis Mccray   [place]  Mobile Phone: [phone]  Relation: Sister  Secondary Emergency Contact: Viry Mccray   [place]  Mobile Phone: [phone]  Relation: Sister    Discharge Plan A: (P) Home         [facility]  [address]  [place] [address]  Phone: [phone] Fax: [phone]    [id number] [place] - [address] AT [facility] [address]  [facility] [place] [address]  Phone: [phone] Fax: [phone]      Initial Assessment (most recent)       Adult Discharge Assessment - 08/10/22 1629          Discharge Assessment    Assessment Type Discharge Planning Assessment (P)      Confirmed/corrected address, phone number and insurance Yes (P)      Confirmed Demographics Correct on Facesheet (P)      Source of Information patient;family;health record (P)      Communicated RUTH with patient/caregiver Yes (P)      Lives With alone (P)      Do you have help at home or someone to help you manage your care at home? No (P)   "    Prior to hospitilization cognitive status: Alert/Oriented (P)      Current cognitive status: Alert/Oriented (P)      Walking or Climbing Stairs Difficulty none (P)      Dressing/Bathing Difficulty none (P)      Equipment Currently Used at Home oxygen;BIPAP (P)      Patient currently being followed by outpatient case management? No (P)      Do you currently have service(s) that help you manage your care at home? No (P)      Do you take prescription medications? Yes (P)      Do you have prescription coverage? Yes (P)      Do you have any problems affording any of your prescribed medications? No (P)      Is the patient taking medications as prescribed? yes (P)      How do you get to doctors appointments? family or friend will provide;public transportation (P)      Are you on dialysis? No (P)      Discharge Plan A Home (P)      DME Needed Upon Discharge  other (see comments) (P)    bi-pap may need repair per Mr Mccray    Discharge Plan discussed with: Patient (P)      Discharge Barriers Identified None (P)

## 2022-08-10 NOTE — ED PROVIDER NOTES
Encounter Date: 8/10/2022       History     Chief Complaint   Patient presents with    Shortness of Breath     Arrived via ems from home with c/o SOB chronic, increased last x2 days, on EMS arrival pt 88% on home O2 2L, with EMS pt 95% on 2L EMS O2, received duo neb by EMS now 100% with treatment in progress     62yom with a history of COPD and HTN presents with shortness of breath since last night. He says that he tried to use his bipap overnight, but it wasn't working, and this morning he became very short of breath, so he called EMS. EMS said that the pt rec'd a treatment en route, but it was not very effective. The pt is normally on 2L of O2 at home, but he was on 5L per EMS and still only satting 90. He also says he has a cough, but otherwise denies any associated constitutional symptoms.        Review of patient's allergies indicates:   Allergen Reactions    Benazepril Swelling    Duoneb [ipratropium-albuterol]      Report minor Tremors, pt seems to be tolerating well.     Past Medical History:   Diagnosis Date    Asthma     Atrial fibrillation with rapid ventricular response     CHF (congestive heart failure)     COPD (chronic obstructive pulmonary disease)     home O2 at night only    Coronary artery disease     Hypertension     Lung nodule     Pneumonia     Seizures      Past Surgical History:   Procedure Laterality Date    ESOPHAGOGASTRODUODENOSCOPY N/A 2/11/2022    Procedure: EGD (ESOPHAGOGASTRODUODENOSCOPY);  Surgeon: Cain Ortega MD;  Location: [facility] ENDO ([address] [place] [address]);  Service: Endoscopy;  Laterality: N/A;    LEFT HEART CATHETERIZATION  4/23/2020    Procedure: Left heart cath;  Surgeon: Nic Pollack MD;  Location: [facility] CATH LAB;  Service: Cardiology;;     Family History   Problem Relation Age of Onset    Cancer Father     Hypertension Sister     Stroke Sister     Stroke Brother     Diabetes Neg Hx     Heart disease Neg Hx      Social History     Tobacco Use    Smoking status:  Current Some Day Smoker     Packs/day: 0.25     Types: Cigarettes    Smokeless tobacco: Never Used    Tobacco comment: 1-2 per day   Substance Use Topics    Alcohol use: Yes     Alcohol/week: 2.0 standard drinks     Types: 2 Cans of beer per week     Comment: every night    Drug use: No     Review of Systems   Constitutional: Negative for chills and fever.   HENT: Negative for congestion and sore throat.    Eyes: Negative for redness and visual disturbance.   Respiratory: Positive for cough, chest tightness, shortness of breath and wheezing. Negative for apnea, choking and stridor.    Cardiovascular: Negative for chest pain and leg swelling.   Gastrointestinal: Negative for abdominal pain, diarrhea, nausea and vomiting.   Genitourinary: Negative for difficulty urinating and dysuria.   Musculoskeletal: Negative for back pain and myalgias.   Skin: Negative for pallor and rash.   Neurological: Negative for dizziness, syncope, weakness and headaches.   Psychiatric/Behavioral: Negative for confusion and dysphoric mood.       Physical Exam     Initial Vitals [08/10/22 0910]   BP Pulse Resp Temp SpO2   (!) 150/88 98 (!) 25 98.3 °F (36.8 °C) 100 %      MAP       --         Physical Exam    Nursing note and vitals reviewed.  Constitutional: He appears well-developed and well-nourished. He is not diaphoretic. He appears distressed.   HENT:   Head: Normocephalic and atraumatic.   Eyes: EOM are normal. Pupils are equal, round, and reactive to light. Right eye exhibits no discharge. Left eye exhibits no discharge.   Neck: Neck supple. No JVD present.   Normal range of motion.  Cardiovascular: Regular rhythm, normal heart sounds and intact distal pulses. Tachycardia present.    Pulmonary/Chest: Breath sounds normal. He has no wheezes. He has no rhonchi.   Abdominal: Abdomen is soft. Bowel sounds are normal. There is no abdominal tenderness. There is no guarding.   Musculoskeletal:         General: No tenderness or edema.  Normal range of motion.      Cervical back: Normal range of motion and neck supple.     Neurological: He is alert and oriented to person, place, and time. He has normal strength. GCS score is 15. GCS eye subscore is 4. GCS verbal subscore is 5. GCS motor subscore is 6.   Skin: Skin is warm and dry. Capillary refill takes less than 2 seconds. No rash noted. No erythema. No pallor.   Psychiatric: He has a normal mood and affect. His behavior is normal. Judgment and thought content normal.         ED Course   Procedures  Labs Reviewed   CBC W/ AUTO DIFFERENTIAL - Abnormal; Notable for the following components:       Result Value    Hemoglobin 12.8 (*)     MCV 72 (*)     MCH 22.5 (*)     MCHC 31.1 (*)     RDW 21.1 (*)     MPV 8.9 (*)     Baso # 0.90 (*)     All other components within normal limits   COMPREHENSIVE METABOLIC PANEL - Abnormal; Notable for the following components:    Calcium 8.6 (*)     Albumin 3.1 (*)     All other components within normal limits   B-TYPE NATRIURETIC PEPTIDE - Abnormal; Notable for the following components:     (*)     All other components within normal limits    Narrative:     ADD-ON BNP #[id number] PER RAYMOND LÓPEZ MD 10:53    ISTAT PROCEDURE - Abnormal; Notable for the following components:    POC PH 7.299 (*)     POC PCO2 76.8 (*)     POC PO2 24 (*)     POC HCO3 37.7 (*)     POC SATURATED O2 35 (*)     POC TCO2 40 (*)     All other components within normal limits   ISTAT PROCEDURE - Abnormal; Notable for the following components:    POC PCO2 55.5 (*)     POC HCO3 31.6 (*)     POC SATURATED O2 88 (*)     POC TCO2 33 (*)     All other components within normal limits   SARS-COV-2 RNA AMPLIFICATION, QUAL   B-TYPE NATRIURETIC PEPTIDE        ECG Results          EKG 12-lead (Final result)  Result time 08/10/22 12:59:26    Final result by Interface, Lab In [facility] (08/10/22 12:59:26)                 Narrative:    Test Reason : R06.02,    Vent. Rate : 110 BPM     Atrial Rate  : 110 BPM     P-R Int : 176 ms          QRS Dur : 068 ms      QT Int : 334 ms       P-R-T Axes : 078 074 079 degrees     QTc Int : 452 ms    Sinus tachycardia  Right atrial enlargement  Abnormal ECG  When compared with ECG of 22-JUL-2022 09:39,  No significant change was found  Confirmed by DANIELLA STEPHENSON MD ([id number]) on 8/10/2022 12:59:17 PM    Referred By: [email or web address]   SELF           Confirmed By:DANIELLA STEPHENSON MD                            Imaging Results          X-Ray Chest 1 View (Final result)  Result time 08/10/22 10:35:12    Final result by Hung Jones MD (08/10/22 10:35:12)                 Impression:      See above      Electronically signed by: Hung Jones MD  Date:    08/10/2022  Time:    10:35             Narrative:    EXAMINATION:  XR CHEST 1 VIEW    CLINICAL HISTORY:  shortness of breath;    TECHNIQUE:  Single frontal view of the chest was performed.    COMPARISON:  07/22/2022    FINDINGS:  Cardiac size is normal.  Except for a few fibrotic streaks in the left lung lungs are clear.  No infiltrate is noted.                                 Medications   albuterol sulfate nebulizer solution 10 mg (0 mg Nebulization Stopped 8/10/22 1236)   methylPREDNISolone sodium succinate injection 125 mg (125 mg Intravenous Given 8/10/22 0951)   albuterol-ipratropium 2.5 mg-0.5 mg/3 mL nebulizer solution 3 mL (3 mLs Nebulization Given 8/10/22 1015)   magnesium sulfate 2g in water 50mL IVPB (premix) (0 g Intravenous Stopped 8/10/22 1627)     Medical Decision Making:   History:   I obtained history from: someone other than patient.       <> Summary of History: EMS  Old Medical Records: I decided to obtain old medical records.  Old Records Summarized: records from clinic visits and records from previous admission(s).       <> Summary of Records: Prior medical history, and current medications  Initial Assessment:   62yom in visible distress, tachycardic and tachypnic. Chest sounds very tight, few wheezes. Pt  given tx and placed, on 5L, showed some improvement. Pt placed on Bipap.    Hx concerning for: COPd exacerbation, CHF exacerbation, COVID, ACS  Independently Interpreted Test(s):   I have ordered and independently interpreted X-rays - see prior notes.  I have ordered and independently interpreted EKG Reading(s) - see prior notes  Clinical Tests:   Lab Tests: Reviewed  Radiological Study: Reviewed  Medical Tests: Reviewed  ED Management:  See ED course.    Pt was not diuresed as there was no evidence of fluid overload on cxr or physical exam.    Pt was weaned from BiPAP and admitted to Medicine. SW following.             ED Course as of 08/10/22 1710   Wed Aug 10, 2022   0947 EKG 12-lead (Shortness of Breath) Age > 50  Sinus tachycardia with no ST elevations [BP]   1030 WBC: 8.93 [BP]   1030 Hemoglobin(!): 12.8  Appears to be his baseline [BP]   1054 X-Ray Chest 1 View  Lungs are clear, but pt has increased WOB, increasing o2 requirements, NIV ordered [BP]   1125 SARS-CoV-2 RNA, Amplification, Qual: Negative [BP]   1130 Still with diminished breath sounds  Ordered VBG  Placed on bipap, mag IV, continuous albuterol  No signs of DVT [GK]   1148 BNP(!): 926 [BP]   1148 Creatinine: 0.9 [BP]   1223 POC PH(!): 7.299 [BP]   1223 POC PCO2(!): 76.8 [BP]   1233 Anion Gap: 15 [BP]   1446 Trial off bipap [GK]      ED Course User Index  [BP] Conrado Reddy MD  [GK] Maura Mcclendon MD             Clinical Impression:   Final diagnoses:  [R06.02] SOB (shortness of breath)  [R06.02] Shortness of breath  [J44.1] COPD exacerbation (Primary)  [J96.01, J96.02] Acute respiratory failure with hypoxia and hypercarbia          ED Disposition Condition    Admit               Conrado Reddy MD  Resident  08/10/22 1730

## 2022-08-10 NOTE — ED TRIAGE NOTES
Patient arrived by EMS. Pt states he had SOB with use of oxygen. Pt states he has a hx of hypertension and COPD. Denies having history of heart failure.

## 2022-08-10 NOTE — PLAN OF CARE
08/10/22 1704   Post-Acute Status   Post-Acute Authorization HME   HME Status Pending education  (self reports that it needs to be repaired)   Discharge Delays None known at this time   Discharge Plan   Discharge Plan A Home

## 2022-08-10 NOTE — CARE UPDATE
08/10/22 1015   Aerosol Therapy   $ Aerosol Therapy Charges Aerosol Treatment   Respiratory Treatment Status (SVN) given   Treatment Route (SVN) mask   Patient Position (SVN) HOB elevated   Post Treatment Assessment (SVN) breath sounds unchanged   Signs of Intolerance (SVN) (S)  agitation;restless;work of breathing increased

## 2022-08-10 NOTE — PLAN OF CARE
Plan of Care Update ED U-Turn    Patient Name: Baltazar Mccray         Goals:  See PCP in 1-2 weeks  See pulm clinic as scheduled  Have your DME repaired/assessed by your Home DME supplier  Fernanda Gibbons RN to follow when discharged.            08/10/22 3537   Discharge Assessment   Assessment Type Discharge Planning Assessment   Source of Information patient;health record   Lives With alone   How do you get to doctors appointments? family or friend will provide;public transportation   Discharge Plan A Home   DME Needed Upon Discharge  other (see comments)  (bi-pap repair/assessed)   Discharge Barriers Identified None

## 2022-08-10 NOTE — ED NOTES
Pt desatted to 86% on 2L. Sat pt upright and bumped oxygen to 4L. Pt still satting low 80s and tachynepic. MD made aware.

## 2022-08-11 LAB
ALBUMIN SERPL BCP-MCNC: 2.8 G/DL (ref 3.5–5.2)
ALP SERPL-CCNC: 80 U/L (ref 55–135)
ALT SERPL W/O P-5'-P-CCNC: 9 U/L (ref 10–44)
ANION GAP SERPL CALC-SCNC: 11 MMOL/L (ref 8–16)
ASCENDING AORTA: 2.1 CM
AST SERPL-CCNC: 11 U/L (ref 10–40)
AV INDEX (PROSTH): 0.77
AV MEAN GRADIENT: 4 MMHG
AV PEAK GRADIENT: 7 MMHG
AV VALVE AREA: 3.46 CM2
AV VELOCITY RATIO: 0.71
BASOPHILS # BLD AUTO: 0 K/UL (ref 0–0.2)
BASOPHILS NFR BLD: 0 % (ref 0–1.9)
BILIRUB SERPL-MCNC: 0.4 MG/DL (ref 0.1–1)
BSA FOR ECHO PROCEDURE: 1.66 M2
BUN SERPL-MCNC: 12 MG/DL (ref 6–20)
CALCIUM SERPL-MCNC: 8.9 MG/DL (ref 8.7–10.5)
CHLORIDE SERPL-SCNC: 97 MMOL/L (ref 95–110)
CO2 SERPL-SCNC: 30 MMOL/L (ref 23–29)
CREAT SERPL-MCNC: 0.7 MG/DL (ref 0.5–1.4)
CV ECHO LV RWT: 0.45 CM
DIFFERENTIAL METHOD: ABNORMAL
DOP CALC AO PEAK VEL: 1.31 M/S
DOP CALC AO VTI: 26.43 CM
DOP CALC LVOT AREA: 4.5 CM2
DOP CALC LVOT DIAMETER: 2.39 CM
DOP CALC LVOT PEAK VEL: 0.93 M/S
DOP CALC LVOT STROKE VOLUME: 91.52 CM3
DOP CALCLVOT PEAK VEL VTI: 20.41 CM
E WAVE DECELERATION TIME: 199.72 MSEC
E/A RATIO: 0.82
E/E' RATIO: 7.88 M/S
ECHO LV POSTERIOR WALL: 0.89 CM (ref 0.6–1.1)
EJECTION FRACTION: 55 %
EOSINOPHIL # BLD AUTO: 0 K/UL (ref 0–0.5)
EOSINOPHIL NFR BLD: 0 % (ref 0–8)
ERYTHROCYTE [DISTWIDTH] IN BLOOD BY AUTOMATED COUNT: 20.8 % (ref 11.5–14.5)
EST. GFR  (NO RACE VARIABLE): >60 ML/MIN/1.73 M^2
FRACTIONAL SHORTENING: 28 % (ref 28–44)
GLUCOSE SERPL-MCNC: 119 MG/DL (ref 70–110)
HCT VFR BLD AUTO: 38.1 % (ref 40–54)
HGB BLD-MCNC: 12.1 G/DL (ref 14–18)
IMM GRANULOCYTES # BLD AUTO: 0.02 K/UL (ref 0–0.04)
IMM GRANULOCYTES NFR BLD AUTO: 0.3 % (ref 0–0.5)
INTERVENTRICULAR SEPTUM: 0.93 CM (ref 0.6–1.1)
LA MAJOR: 4.97 CM
LA MINOR: 5.32 CM
LA WIDTH: 4.52 CM
LEFT ATRIUM SIZE: 4.59 CM
LEFT ATRIUM VOLUME INDEX MOD: 46.1 ML/M2
LEFT ATRIUM VOLUME INDEX: 53.9 ML/M2
LEFT ATRIUM VOLUME MOD: 77.39 CM3
LEFT ATRIUM VOLUME: 90.63 CM3
LEFT INTERNAL DIMENSION IN SYSTOLE: 2.84 CM (ref 2.1–4)
LEFT VENTRICLE DIASTOLIC VOLUME INDEX: 40.76 ML/M2
LEFT VENTRICLE DIASTOLIC VOLUME: 68.47 ML
LEFT VENTRICLE MASS INDEX: 65 G/M2
LEFT VENTRICLE SYSTOLIC VOLUME INDEX: 18.1 ML/M2
LEFT VENTRICLE SYSTOLIC VOLUME: 30.49 ML
LEFT VENTRICULAR INTERNAL DIMENSION IN DIASTOLE: 3.96 CM (ref 3.5–6)
LEFT VENTRICULAR MASS: 109.59 G
LV LATERAL E/E' RATIO: 7 M/S
LV SEPTAL E/E' RATIO: 9 M/S
LYMPHOCYTES # BLD AUTO: 0.4 K/UL (ref 1–4.8)
LYMPHOCYTES NFR BLD: 5.3 % (ref 18–48)
MCH RBC QN AUTO: 23.2 PG (ref 27–31)
MCHC RBC AUTO-ENTMCNC: 31.8 G/DL (ref 32–36)
MCV RBC AUTO: 73 FL (ref 82–98)
MONOCYTES # BLD AUTO: 0.1 K/UL (ref 0.3–1)
MONOCYTES NFR BLD: 1.4 % (ref 4–15)
MV A" WAVE DURATION": 11.04 MSEC
MV PEAK A VEL: 0.77 M/S
MV PEAK E VEL: 0.63 M/S
MV STENOSIS PRESSURE HALF TIME: 57.92 MS
MV VALVE AREA P 1/2 METHOD: 3.8 CM2
NEUTROPHILS # BLD AUTO: 6.5 K/UL (ref 1.8–7.7)
NEUTROPHILS NFR BLD: 93 % (ref 38–73)
NRBC BLD-RTO: 0 /100 WBC
PLATELET # BLD AUTO: 362 K/UL (ref 150–450)
PMV BLD AUTO: 9.5 FL (ref 9.2–12.9)
POTASSIUM SERPL-SCNC: 4.3 MMOL/L (ref 3.5–5.1)
PROT SERPL-MCNC: 6.6 G/DL (ref 6–8.4)
PULM VEIN S/D RATIO: 1.18
PV PEAK D VEL: 0.4 M/S
PV PEAK S VEL: 0.47 M/S
RA MAJOR: 3.88 CM
RA PRESSURE: 8 MMHG
RA WIDTH: 3.69 CM
RBC # BLD AUTO: 5.22 M/UL (ref 4.6–6.2)
RIGHT VENTRICULAR END-DIASTOLIC DIMENSION: 2.94 CM
SINUS: 3.33 CM
SODIUM SERPL-SCNC: 138 MMOL/L (ref 136–145)
STJ: 2.48 CM
TDI LATERAL: 0.09 M/S
TDI SEPTAL: 0.07 M/S
TDI: 0.08 M/S
TRICUSPID ANNULAR PLANE SYSTOLIC EXCURSION: 1.44 CM
WBC # BLD AUTO: 6.94 K/UL (ref 3.9–12.7)

## 2022-08-11 PROCEDURE — 25500020 PHARM REV CODE 255: Performed by: STUDENT IN AN ORGANIZED HEALTH CARE EDUCATION/TRAINING PROGRAM

## 2022-08-11 PROCEDURE — G0378 HOSPITAL OBSERVATION PER HR: HCPCS

## 2022-08-11 PROCEDURE — 27000646 HC AEROBIKA DEVICE

## 2022-08-11 PROCEDURE — 99220 PR INITIAL OBSERVATION CARE,LEVL III: CPT | Mod: ,,, | Performed by: HOSPITALIST

## 2022-08-11 PROCEDURE — 99900035 HC TECH TIME PER 15 MIN (STAT)

## 2022-08-11 PROCEDURE — 25000003 PHARM REV CODE 250: Performed by: INTERNAL MEDICINE

## 2022-08-11 PROCEDURE — 63600175 PHARM REV CODE 636 W HCPCS: Performed by: HOSPITALIST

## 2022-08-11 PROCEDURE — 80053 COMPREHEN METABOLIC PANEL: CPT | Performed by: HOSPITALIST

## 2022-08-11 PROCEDURE — 96374 THER/PROPH/DIAG INJ IV PUSH: CPT

## 2022-08-11 PROCEDURE — 94640 AIRWAY INHALATION TREATMENT: CPT

## 2022-08-11 PROCEDURE — 85025 COMPLETE CBC W/AUTO DIFF WBC: CPT | Performed by: HOSPITALIST

## 2022-08-11 PROCEDURE — 27000190 HC CPAP FULL FACE MASK W/VALVE

## 2022-08-11 PROCEDURE — 99220 PR INITIAL OBSERVATION CARE,LEVL III: ICD-10-PCS | Mod: ,,, | Performed by: HOSPITALIST

## 2022-08-11 PROCEDURE — 94799 UNLISTED PULMONARY SVC/PX: CPT

## 2022-08-11 PROCEDURE — 99214 OFFICE O/P EST MOD 30 MIN: CPT | Mod: GC,,, | Performed by: INTERNAL MEDICINE

## 2022-08-11 PROCEDURE — 36415 COLL VENOUS BLD VENIPUNCTURE: CPT | Performed by: HOSPITALIST

## 2022-08-11 PROCEDURE — 94640 AIRWAY INHALATION TREATMENT: CPT | Mod: XB

## 2022-08-11 PROCEDURE — 25000003 PHARM REV CODE 250: Performed by: HOSPITALIST

## 2022-08-11 PROCEDURE — 25000242 PHARM REV CODE 250 ALT 637 W/ HCPCS: Performed by: HOSPITALIST

## 2022-08-11 PROCEDURE — 94761 N-INVAS EAR/PLS OXIMETRY MLT: CPT

## 2022-08-11 PROCEDURE — 99214 PR OFFICE/OUTPT VISIT, EST, LEVL IV, 30-39 MIN: ICD-10-PCS | Mod: GC,,, | Performed by: INTERNAL MEDICINE

## 2022-08-11 PROCEDURE — 96372 THER/PROPH/DIAG INJ SC/IM: CPT | Performed by: HOSPITALIST

## 2022-08-11 PROCEDURE — 94660 CPAP INITIATION&MGMT: CPT

## 2022-08-11 PROCEDURE — 27000221 HC OXYGEN, UP TO 24 HOURS

## 2022-08-11 PROCEDURE — 96361 HYDRATE IV INFUSION ADD-ON: CPT

## 2022-08-11 PROCEDURE — 94664 DEMO&/EVAL PT USE INHALER: CPT | Mod: XB

## 2022-08-11 PROCEDURE — 96375 TX/PRO/DX INJ NEW DRUG ADDON: CPT

## 2022-08-11 PROCEDURE — 25000003 PHARM REV CODE 250: Performed by: STUDENT IN AN ORGANIZED HEALTH CARE EDUCATION/TRAINING PROGRAM

## 2022-08-11 RX ORDER — CALCIUM CARBONATE 200(500)MG
500 TABLET,CHEWABLE ORAL 3 TIMES DAILY PRN
Status: DISCONTINUED | OUTPATIENT
Start: 2022-08-11 | End: 2022-08-12 | Stop reason: HOSPADM

## 2022-08-11 RX ORDER — ALUMINUM HYDROXIDE, MAGNESIUM HYDROXIDE, AND SIMETHICONE 2400; 240; 2400 MG/30ML; MG/30ML; MG/30ML
30 SUSPENSION ORAL EVERY 6 HOURS PRN
Status: DISCONTINUED | OUTPATIENT
Start: 2022-08-11 | End: 2022-08-12 | Stop reason: HOSPADM

## 2022-08-11 RX ADMIN — ENOXAPARIN SODIUM 40 MG: 100 INJECTION SUBCUTANEOUS at 04:08

## 2022-08-11 RX ADMIN — CARVEDILOL 12.5 MG: 12.5 TABLET, FILM COATED ORAL at 04:08

## 2022-08-11 RX ADMIN — PANTOPRAZOLE SODIUM 40 MG: 40 TABLET, DELAYED RELEASE ORAL at 08:08

## 2022-08-11 RX ADMIN — CALCIUM CARBONATE (ANTACID) CHEW TAB 500 MG 500 MG: 500 CHEW TAB at 10:08

## 2022-08-11 RX ADMIN — IPRATROPIUM BROMIDE AND ALBUTEROL SULFATE 3 ML: 2.5; .5 SOLUTION RESPIRATORY (INHALATION) at 09:08

## 2022-08-11 RX ADMIN — ROFLUMILAST 500 MCG: 500 TABLET ORAL at 11:08

## 2022-08-11 RX ADMIN — PREDNISONE 40 MG: 20 TABLET ORAL at 11:08

## 2022-08-11 RX ADMIN — CARVEDILOL 12.5 MG: 12.5 TABLET, FILM COATED ORAL at 10:08

## 2022-08-11 RX ADMIN — FUROSEMIDE 40 MG: 20 TABLET ORAL at 10:08

## 2022-08-11 RX ADMIN — PANTOPRAZOLE SODIUM 40 MG: 40 TABLET, DELAYED RELEASE ORAL at 10:08

## 2022-08-11 RX ADMIN — IPRATROPIUM BROMIDE AND ALBUTEROL SULFATE 3 ML: 2.5; .5 SOLUTION RESPIRATORY (INHALATION) at 07:08

## 2022-08-11 RX ADMIN — IPRATROPIUM BROMIDE AND ALBUTEROL SULFATE 3 ML: 2.5; .5 SOLUTION RESPIRATORY (INHALATION) at 03:08

## 2022-08-11 RX ADMIN — ALUMINUM HYDROXIDE, MAGNESIUM HYDROXIDE, AND DIMETHICONE 30 ML: 400; 400; 40 SUSPENSION ORAL at 11:08

## 2022-08-11 RX ADMIN — IPRATROPIUM BROMIDE AND ALBUTEROL SULFATE 3 ML: 2.5; .5 SOLUTION RESPIRATORY (INHALATION) at 01:08

## 2022-08-11 RX ADMIN — IOHEXOL 75 ML: 350 INJECTION, SOLUTION INTRAVENOUS at 02:08

## 2022-08-11 RX ADMIN — ONDANSETRON 4 MG: 2 INJECTION INTRAMUSCULAR; INTRAVENOUS at 05:08

## 2022-08-11 RX ADMIN — FERROUS SULFATE TAB 325 MG (65 MG ELEMENTAL FE) 1 EACH: 325 (65 FE) TAB at 10:08

## 2022-08-11 NOTE — CONSULTS
[facility] ([place] [address])  Pulmonology  Consult Note    Patient Name: Baltazar Mccray  MRN: [id number]  Admission Date: 8/10/2022  Hospital Length of Stay: 0 days  Code Status: Full Code  Attending Physician: Marilee Jackson MD  Primary Care Provider: Rh[facility]   Principal Problem: Acute on chronic respiratory failure with hypoxia and hypercapnia    Inpatient consult to Pulmonology  Consult performed by: Santosh Boyd MD  Consult ordered by: Marilee Jackson MD        Subjective:     HPI:  This is a 62 year old gentleman with Afib, HTN, and COPD on 2L NC and BiPAP qhs and recurrent COPD exacerbations requiring frequent admissions who presents with a COPD exacerbation. Patient complains of SOB not relieved with his home inhalers or his BiPAP which prompted him to come to the ED. He denies any fevers, chills, chest pain, leg swelling, or leg pain. In the ED, VBG showed CO2 of 75. Patient was placed on BiPAP and came down to the 50s and then the patient was transitioned to his home 2L NC. CXR showed hyperinflated lungs and no flatting of the costophrenic angle. CT ordered.     Upon interview of the patient, he was recently started on Daliresp and patient had been compliant with all his medications. He does state that when he becomes short of breath, he takes extra puffs of his Breztri rather than his rescue inhaler. Patient also is current everyday smoker but had significant decrease from his 2 ppd history. 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     Upon chart review, patient was seeing Dr. Pascal who prescribed Daliresp along with his Breztri and duo-nebs. Last PFTs showed severe obstruction with severely depressed DLCO.     Pulmonology consulted for recurrent admissions for COPD exacerbation and medication optimization.     Past Medical History:   Diagnosis Date    Asthma     Atrial fibrillation with rapid ventricular response     CHF  (congestive heart failure)     COPD (chronic obstructive pulmonary disease)     home O2 at night only    Coronary artery disease     Hypertension     Lung nodule     Pneumonia     Seizures        Past Surgical History:   Procedure Laterality Date    ESOPHAGOGASTRODUODENOSCOPY N/A 2/11/2022    Procedure: EGD (ESOPHAGOGASTRODUODENOSCOPY);  Surgeon: Cain Ortega MD;  [facility]  Service: Endoscopy;  Laterality: N/A;    LEFT HEART CATHETERIZATION  4/23/2020    Procedure: Left heart cath;  Surgeon: Nic Pollack MD;  Location: [facility] CATH LAB;  Service: Cardiology;;       Review of patient's allergies indicates:   Allergen Reactions    Benazepril Swelling       Family History       Problem Relation (Age of Onset)    Cancer Father    Hypertension Sister    Stroke Sister, Brother          Tobacco Use    Smoking status: Current Some Day Smoker     Packs/day: 0.25     Types: Cigarettes    Smokeless tobacco: Never Used    Tobacco comment: 1-2 per day   Substance and Sexual Activity    Alcohol use: Yes     Alcohol/week: 2.0 standard drinks     Types: 2 Cans of beer per week     Comment: every night    Drug use: No    Sexual activity: Not Currently         Review of Systems   Constitutional:  Negative for chills, fatigue and fever.   HENT:  Negative for ear pain and sinus pain.    Eyes:  Negative for photophobia, pain and visual disturbance.   Respiratory:  Positive for cough and shortness of breath. Negative for chest tightness and wheezing.    Cardiovascular:  Positive for chest pain. Negative for leg swelling.   Gastrointestinal:  Negative for abdominal pain, blood in stool, constipation, diarrhea, nausea and vomiting.   Endocrine: Negative for polydipsia and polyuria.   Genitourinary:  Negative for difficulty urinating, dysuria, flank pain, frequency, penile pain and testicular pain.   Musculoskeletal:  Negative for back pain, neck pain and neck stiffness.   Skin:  Negative for color  change and rash.   Neurological:  Negative for dizziness, syncope, weakness, numbness and headaches.   Psychiatric/Behavioral:  Negative for confusion and sleep disturbance.    Objective:     Vital Signs (Most Recent):  Temp: 98.9 °F (37.2 °C) (08/11/22 0952)  Pulse: 101 (08/11/22 0952)  Resp: 18 (08/11/22 0952)  BP: (!) 150/78 (08/11/22 0952)  SpO2: (!) 92 % (08/11/22 0952)   Vital Signs (24h Range):  Temp:  [97.6 °F (36.4 °C)-98.9 °F (37.2 °C)] 98.9 °F (37.2 °C)  Pulse:  [] 101  Resp:  [17-27] 18  SpO2:  [86 %-99 %] 92 %  BP: (142-166)/(73-91) 150/78     Weight: 58.5 kg (129 lb)  Body mass index is 20.2 kg/m².      Intake/Output Summary (Last 24 hours) at 8/11/2022 1023  Last data filed at 8/11/2022 0952  Gross per 24 hour   Intake --   Output 625 ml   Net -625 ml       Physical Exam  Vitals and nursing note reviewed.   Constitutional:       General: He is not in acute distress.     Appearance: He is well-developed. He is not ill-appearing.   HENT:      Head: Normocephalic and atraumatic.      Right Ear: External ear normal.      Left Ear: External ear normal.      Nose: Nose normal. No congestion or rhinorrhea.      Mouth/Throat:      Mouth: Mucous membranes are moist.      Pharynx: Oropharynx is clear. No oropharyngeal exudate or posterior oropharyngeal erythema.      Comments: Poor dentition  Eyes:      General: No scleral icterus.        Right eye: No discharge.         Left eye: No discharge.      Extraocular Movements: Extraocular movements intact.      Conjunctiva/sclera: Conjunctivae normal.   Neck:      Vascular: No JVD.      Trachea: No tracheal deviation.   Cardiovascular:      Rate and Rhythm: Regular rhythm. Tachycardia present.      Pulses: Normal pulses.      Heart sounds: Normal heart sounds. No murmur heard.    No friction rub. No gallop.   Pulmonary:      Effort: Respiratory distress present.      Breath sounds: No stridor. Rales present. No wheezing.      Comments: Diffusely decreased  breath sounds  Chest:      Chest wall: No tenderness.   Musculoskeletal:         General: No swelling, tenderness or deformity. Normal range of motion.      Cervical back: Normal range of motion and neck supple. No rigidity or tenderness.      Right lower leg: No edema.      Left lower leg: No edema.   Lymphadenopathy:      Cervical: No cervical adenopathy.   Skin:     General: Skin is warm and dry.      Capillary Refill: Capillary refill takes less than 2 seconds.      Coloration: Skin is not jaundiced.      Findings: No bruising, erythema or rash.   Neurological:      General: No focal deficit present.      Mental Status: He is alert and oriented to person, place, and time.      Cranial Nerves: No cranial nerve deficit.      Sensory: No sensory deficit.      Motor: No weakness.   Psychiatric:         Mood and Affect: Mood normal.         Thought Content: Thought content normal.       Vents:  Oxygen Concentration (%): 28 (08/10/22 1203)    Lines/Drains/Airways       None                   Significant Labs:    CBC/Anemia Profile:  Recent Labs   Lab 08/10/22  0947 08/11/22  0410   WBC 8.93 6.94   HGB 12.8* 12.1*   HCT 41.2 38.1*    362   MCV 72* 73*   RDW 21.1* 20.8*        Chemistries:  Recent Labs   Lab 08/10/22  0947 08/11/22  0410    138   K 3.7 4.3   CL 95 97   CO2 28 30*   BUN 6 12   CREATININE 0.9 0.7   CALCIUM 8.6* 8.9   ALBUMIN 3.1* 2.8*   PROT 7.2 6.6   BILITOT 0.5 0.4   ALKPHOS 97 80   ALT 13 9*   AST 15 11       CMP:   Recent Labs   Lab 08/10/22  0947 08/11/22  0410    138   K 3.7 4.3   CL 95 97   CO2 28 30*   GLU 83 119*   BUN 6 12   CREATININE 0.9 0.7   CALCIUM 8.6* 8.9   PROT 7.2 6.6   ALBUMIN 3.1* 2.8*   BILITOT 0.5 0.4   ALKPHOS 97 80   AST 15 11   ALT 13 9*   ANIONGAP 15 11     All pertinent labs within the past 24 hours have been reviewed.    Significant Imaging:   I have reviewed all pertinent imaging results/findings within the past 24 hours.    Assessment/Plan:     * Acute on  chronic respiratory failure with hypoxia and hypercapnia  COPD exacerbation    62 yoM with COPD, tobacco abuse, and recurrent COPD exacerbations admitted for COPD exacerbation. Patient was previously seen by pulmonology and was started on Daliresp. Patient also states that he had been taking his Breztri as a rescue inhaler.     Plan:  - advised smoking cessation  - advised strictly adhering to budesonide-glycopyrlate-formoterol (Breztri) 2 puffs BID and duo-neb as a rescue inhaler  - continue roflumilast (Daliresp) and educated that it could take up to a year for full effects  - recommend adding azithromycin 500 mg MWF  - outpatient follow up with pulmonology    Tobacco abuse  Long history of tobacco abuse, cut down to 1-2 cigarettes per day.     - advised smoking cessation          Thank you for your consult. I will sign off. Please contact us if you have any additional questions.     Santosh Boyd MD  Pulmonology  [facility] ([place])

## 2022-08-11 NOTE — H&P
[facility] - Int[facility] ([address] Medicine  History & Physical    Patient Name: Baltazar Mccray  MRN: [id number]  Patient Class: OP- Observation  Admission Date: 8/10/2022  Attending Physician: Mariam Wilks MD   Primary Care Provider: [facility]         Patient information was obtained from patient, past medical records and ER records.     Subjective:     Principal Problem:Acute on chronic respiratory failure with hypoxia and hypercapnia    Chief Complaint:   Chief Complaint   Patient presents with    Shortness of Breath     Arrived via ems from home with c/o SOB chronic, increased last x2 days, on EMS arrival pt 88% on home O2 2L, with EMS pt 95% on 2L EMS O2, received duo neb by EMS now 100% with treatment in progress        HPI: 61 yo M with PMHx Afib, HTN, and COPD with chronic hypoxia/hypercapnea (2 L NC/Bipap QHS at home) who presents to the ED complaining of worsening SOB x1 day. Pt. Reports that he started to feel OSB yesterday evening. He tried his home inhalers and Bipap all night, but he still felt very SOB this morning, and his symptoms only got progressively worse. He reports singifcant chest pain and cough, but he deneis any new sputum production, fevers, or chills. Pt. Contacted EMS and they reported that his O2 saturations were low on arrival, requiring 6L to maintain his SpO2. Pt. Placed on BIpap on arrival. He was able to be weaned to his home 2L NC after a few hours and remains stable on this at time of my assessment.      Past Medical History:   Diagnosis Date    Asthma     Atrial fibrillation with rapid ventricular response     CHF (congestive heart failure)     COPD (chronic obstructive pulmonary disease)     home O2 at night only    Coronary artery disease     Hypertension     Lung nodule     Pneumonia     Seizures        Past Surgical History:   Procedure Laterality Date    ESOPHAGOGASTRODUODENOSCOPY N/A 2/11/2022    Procedure: EGD  (ESOPHAGOGASTRODUODENOSCOPY);  Surgeon: Cain Ortega MD;  Location: [facility] ENDO ([address] [place] [address]);  Service: Endoscopy;  Laterality: N/A;    LEFT HEART CATHETERIZATION  4/23/2020    Procedure: Left heart cath;  Surgeon: Nic Pollack MD;  Location: [facility] CATH LAB;  Service: Cardiology;;       Review of patient's allergies indicates:   Allergen Reactions    Benazepril Swelling    Duoneb [ipratropium-albuterol]      Report minor Tremors, pt seems to be tolerating well.       No current facility-administered medications on file prior to encounter.     Current Outpatient Medications on File Prior to Encounter   Medication Sig    acetaminophen (TYLENOL) 325 MG tablet Take 2 tablets (650 mg total) by mouth every 8 (eight) hours as needed for Pain or Temperature greater than (100.5).    albuterol (PROVENTIL/VENTOLIN HFA) 90 mcg/actuation inhaler Inhale 1-2 puffs into the lungs every 6 (six) hours as needed for Wheezing. Rescue    albuterol-ipratropium (DUO-NEB) 2.5 mg-0.5 mg/3 mL nebulizer solution Take 3 mLs by nebulization every 4 (four) hours as needed for Wheezing or Shortness of Breath. Rescue    budesonide-glycopyr-formoterol (BREZTRI AEROSPHERE) 160-9-4.8 mcg/actuation HFAA Inhale 2 puffs into the lungs 2 (two) times daily. Rinse mouth after use. CONTROL INHALER    carvediloL (COREG) 12.5 MG tablet Take 1 tablet (12.5 mg total) by mouth 2 (two) times daily with meals.    ferrous sulfate 325 (65 FE) MG EC tablet Take 1 tablet (325 mg total) by mouth once daily.    nicotine (NICODERM CQ) 7 mg/24 hr Place 1 patch onto the skin once daily.    roflumilast (DALIRESP) 500 mcg Tab Start this after completing 1 month of 250mg daily. Take 1 tablet (500 mcg total) by mouth once daily.    roflumilast 250 mcg Tab Start this first. Take 1 tablet (250 mcg total) by mouth once daily.    furosemide (LASIX) 20 MG tablet Take 1 tablet (20 mg total) by mouth once daily.    pantoprazole (PROTONIX) 40 MG tablet Take 1 tablet  (40 mg total) by mouth 2 (two) times daily.    [DISCONTINUED] tiotropium bromide (SPIRIVA RESPIMAT) 2.5 mcg/actuation inhaler Inhale 2 puffs into the lungs Daily. Controller     Family History       Problem Relation (Age of Onset)    Cancer Father    Hypertension Sister    Stroke Sister, Brother          Tobacco Use    Smoking status: Current Some Day Smoker     Packs/day: 0.25     Types: Cigarettes    Smokeless tobacco: Never Used    Tobacco comment: 1-2 per day   Substance and Sexual Activity    Alcohol use: Yes     Alcohol/week: 2.0 standard drinks     Types: 2 Cans of beer per week     Comment: every night    Drug use: No    Sexual activity: Not Currently     Review of Systems   Constitutional:  Positive for activity change. Negative for chills, fever and unexpected weight change.   HENT:  Negative for congestion and sore throat.    Respiratory:  Positive for cough, chest tightness, shortness of breath and wheezing.    Cardiovascular:  Negative for chest pain, palpitations and leg swelling.   Gastrointestinal:  Negative for abdominal pain, blood in stool, nausea and vomiting.   Genitourinary:  Negative for dysuria and hematuria.   Musculoskeletal:  Negative for arthralgias and neck pain.   Skin:  Negative for color change and rash.   Neurological:  Negative for dizziness, seizures and numbness.   Psychiatric/Behavioral:  Negative for hallucinations and suicidal ideas.    Objective:     Vital Signs (Most Recent):  Temp: 98.3 °F (36.8 °C) (08/10/22 0910)  Pulse: 91 (08/10/22 2001)  Resp: (!) 21 (08/10/22 2001)  BP: (!) 146/76 (08/10/22 2001)  SpO2: 95 % (08/10/22 2001)   Vital Signs (24h Range):  Temp:  [98.3 °F (36.8 °C)] 98.3 °F (36.8 °C)  Pulse:  [] 91  Resp:  [21-28] 21  SpO2:  [86 %-100 %] 95 %  BP: (146-160)/(73-91) 146/76     Weight: 58.5 kg (129 lb)  Body mass index is 20.2 kg/m².    Physical Exam  Vitals reviewed.   Constitutional:       General: He is not in acute distress.     Appearance:  He is well-developed.   HENT:      Head: Normocephalic and atraumatic.   Eyes:      Extraocular Movements: Extraocular movements intact.      Pupils: Pupils are equal, round, and reactive to light.   Neck:      Vascular: No JVD.      Trachea: No tracheal deviation.   Cardiovascular:      Rate and Rhythm: Normal rate and regular rhythm.      Heart sounds: No murmur heard.    No friction rub. No gallop.   Pulmonary:      Effort: Respiratory distress present.      Breath sounds: No wheezing or rales.      Comments: Diffusely decreased breath sounds  Abdominal:      General: Bowel sounds are normal. There is no distension.      Palpations: Abdomen is soft. There is no mass.      Tenderness: There is no abdominal tenderness.   Musculoskeletal:         General: No deformity.      Cervical back: Neck supple.   Lymphadenopathy:      Cervical: No cervical adenopathy.   Skin:     General: Skin is warm and dry.      Findings: No rash.   Neurological:      Mental Status: He is alert and oriented to person, place, and time.       CRANIAL NERVES     CN III, IV, VI   Pupils are equal, round, and reactive to light.     Significant Labs: All pertinent labs within the past 24 hours have been reviewed.    Significant Imaging: I have reviewed all pertinent imaging results/findings within the past 24 hours.    Assessment/Plan:     * Acute on chronic respiratory failure with hypoxia and hypercapnia  -Pt. Presenting SOB, decreased SpO2 on home O2 and requiring Bipap on presentation. Poor air movement on exam. Initial pH 7.299 w/ pCO2 76.8 consistent with hypercapnic respiratory failure in setting of pt. Known severe COPD  -Bipap able to be weaned and repeat VBG improved. Continue supplemental O2 to maintain O2 sats >90%  -PO steroids, scheduled duonebs and PO azithromycin ordered for COPD exacerbation treatment    Atrial fibrillation  Per chart review, patient with Paroxysmal (<7 days) atrial fibrillation which is controlled currently  with Beta Blocker. Patient is currently in sinus rhythm. [id number] Score: 1, not on AC        Chronic diastolic heart failure  -Last TTE 1/2022 showed normal EF, grade I diastolic dysfunction, pulm HTN (50 mmHg)  -Pt. With SOB, no reported edema. CXR without pulm edema. BNP is noted to be significantly elevated at 926, however  -Plan to increase home lasix to 40 mg, repeat TTE. Monitor I&O's, can continue to diurese as needed      Essential hypertension  Continue home Coreg      COPD exacerbation  -See acute on chronic respiratory failure        VTE Risk Mitigation (From admission, onward)         Ordered     enoxaparin injection 40 mg  Daily         08/10/22 2027     IP VTE HIGH RISK PATIENT  Once         08/10/22 2027     Place sequential compression device  Until discontinued         08/10/22 2027     Place sequential compression device  Until discontinued         08/10/22 9134                   John Ortega MD  Department of Hospital Medicine   [facility] - Intensive Care ([place]-[facility])

## 2022-08-11 NOTE — HPI
This is a 62 year old gentleman with Afib, HTN, and COPD on 2L NC and BiPAP qhs and recurrent COPD exacerbations requiring frequent admissions who presents with a COPD exacerbation. Patient complains of SOB not relieved with his home inhalers or his BiPAP which prompted him to come to the ED. He denies any fevers, chills, chest pain, leg swelling, or leg pain. In the ED, VBG showed CO2 of 75. Patient was placed on BiPAP and came down to the 50s and then the patient was transitioned to his home 2L NC. CXR showed hyperinflated lungs and no flatting of the costophrenic angle. CT ordered.     Upon interview of the patient, he was recently started on Daliresp and patient had been compliant with all his medications. He does state that when he becomes short of breath, he takes extra puffs of his Breztri rather than his rescue inhaler. Patient also is current everyday smoker but had significant decrease from his 2 ppd history. Patient is retired but was working off [employer] and then was working [employer].     Upon chart review, patient was seeing Dr. Pascal who prescribed Daliresp along with his Breztri and duo-nebs. Last PFTs showed severe obstruction with severely depressed DLCO.

## 2022-08-11 NOTE — ASSESSMENT & PLAN NOTE
Per chart review, patient with Paroxysmal (<7 days) atrial fibrillation which is controlled currently with Beta Blocker. Patient is currently in sinus rhythm. [id number] Score: 1, not on AC

## 2022-08-11 NOTE — ASSESSMENT & PLAN NOTE
Long history of tobacco abuse, cut down to 1-2 cigarettes per day.     - advised smoking cessation

## 2022-08-11 NOTE — HPI
63 yo M with PMHx Afib, HTN, and COPD with chronic hypoxia/hypercapnea (2 L NC/Bipap QHS at home) who presents to the ED complaining of worsening SOB x1 day. Pt. Reports that he started to feel OSB yesterday evening. He tried his home inhalers and Bipap all night, but he still felt very SOB this morning, and his symptoms only got progressively worse. He reports singifcant chest pain and cough, but he deneis any new sputum production, fevers, or chills. Pt. Contacted EMS and they reported that his O2 saturations were low on arrival, requiring 6L to maintain his SpO2. Pt. Placed on BIpap on arrival. He was able to be weaned to his home 2L NC after a few hours and remains stable on this at time of my assessment.

## 2022-08-11 NOTE — ASSESSMENT & PLAN NOTE
-Pt. Presenting SOB, decreased SpO2 on home O2 and requiring Bipap on presentation. Poor air movement on exam. Initial pH 7.299 w/ pCO2 76.8 consistent with hypercapnic respiratory failure in setting of pt. Known severe COPD  -Bipap able to be weaned and repeat VBG improved. Continue supplemental O2 to maintain O2 sats >90%  -PO steroids, scheduled duonebs and PO azithromycin ordered for COPD exacerbation treatment

## 2022-08-11 NOTE — ASSESSMENT & PLAN NOTE
COPD exacerbation    62 yoM with COPD, tobacco abuse, and recurrent COPD exacerbations admitted for COPD exacerbation. Patient was previously seen by pulmonology and was started on Daliresp. Patient also states that he had been taking his Breztri as a rescue inhaler.     Plan:  - advised smoking cessation  - advised strictly adhering to budesonide-glycopyrlate-formoterol (Breztri) 2 puffs BID and duo-neb as a rescue inhaler  - continue roflumilast (Daliresp) and educated that it could take up to a year for full effects  - recommend adding azithromycin 500 mg MWF  - outpatient follow up with pulmonology

## 2022-08-11 NOTE — SUBJECTIVE & OBJECTIVE
Past Medical History:   Diagnosis Date    Asthma     Atrial fibrillation with rapid ventricular response     CHF (congestive heart failure)     COPD (chronic obstructive pulmonary disease)     home O2 at night only    Coronary artery disease     Hypertension     Lung nodule     Pneumonia     Seizures        Past Surgical History:   Procedure Laterality Date    ESOPHAGOGASTRODUODENOSCOPY N/A 2/11/2022    Procedure: EGD (ESOPHAGOGASTRODUODENOSCOPY);  Surgeon: Cain Ortega MD;  Location: [facility] ENDO ([address] [place] [address]);  Service: Endoscopy;  Laterality: N/A;    LEFT HEART CATHETERIZATION  4/23/2020    Procedure: Left heart cath;  Surgeon: Nic Pollack MD;  Location: [facility] CATH LAB;  Service: Cardiology;;       Review of patient's allergies indicates:   Allergen Reactions    Benazepril Swelling       Family History       Problem Relation (Age of Onset)    Cancer Father    Hypertension Sister    Stroke Sister, Brother          Tobacco Use    Smoking status: Current Some Day Smoker     Packs/day: 0.25     Types: Cigarettes    Smokeless tobacco: Never Used    Tobacco comment: 1-2 per day   Substance and Sexual Activity    Alcohol use: Yes     Alcohol/week: 2.0 standard drinks     Types: 2 Cans of beer per week     Comment: every night    Drug use: No    Sexual activity: Not Currently         Review of Systems   Constitutional:  Negative for chills, fatigue and fever.   HENT:  Negative for ear pain and sinus pain.    Eyes:  Negative for photophobia, pain and visual disturbance.   Respiratory:  Positive for cough and shortness of breath. Negative for chest tightness and wheezing.    Cardiovascular:  Positive for chest pain. Negative for leg swelling.   Gastrointestinal:  Negative for abdominal pain, blood in stool, constipation, diarrhea, nausea and vomiting.   Endocrine: Negative for polydipsia and polyuria.   Genitourinary:  Negative for difficulty urinating, dysuria, flank pain, frequency, penile pain and testicular pain.    Musculoskeletal:  Negative for back pain, neck pain and neck stiffness.   Skin:  Negative for color change and rash.   Neurological:  Negative for dizziness, syncope, weakness, numbness and headaches.   Psychiatric/Behavioral:  Negative for confusion and sleep disturbance.    Objective:     Vital Signs (Most Recent):  Temp: 98.9 °F (37.2 °C) (08/11/22 0952)  Pulse: 101 (08/11/22 0952)  Resp: 18 (08/11/22 0952)  BP: (!) 150/78 (08/11/22 0952)  SpO2: (!) 92 % (08/11/22 0952)   Vital Signs (24h Range):  Temp:  [97.6 °F (36.4 °C)-98.9 °F (37.2 °C)] 98.9 °F (37.2 °C)  Pulse:  [] 101  Resp:  [17-27] 18  SpO2:  [86 %-99 %] 92 %  BP: (142-166)/(73-91) 150/78     Weight: 58.5 kg (129 lb)  Body mass index is 20.2 kg/m².      Intake/Output Summary (Last 24 hours) at 8/11/2022 1023  Last data filed at 8/11/2022 0952  Gross per 24 hour   Intake --   Output 625 ml   Net -625 ml       Physical Exam  Vitals and nursing note reviewed.   Constitutional:       General: He is not in acute distress.     Appearance: He is well-developed. He is not ill-appearing.   HENT:      Head: Normocephalic and atraumatic.      Right Ear: External ear normal.      Left Ear: External ear normal.      Nose: Nose normal. No congestion or rhinorrhea.      Mouth/Throat:      Mouth: Mucous membranes are moist.      Pharynx: Oropharynx is clear. No oropharyngeal exudate or posterior oropharyngeal erythema.      Comments: Poor dentition  Eyes:      General: No scleral icterus.        Right eye: No discharge.         Left eye: No discharge.      Extraocular Movements: Extraocular movements intact.      Conjunctiva/sclera: Conjunctivae normal.   Neck:      Vascular: No JVD.      Trachea: No tracheal deviation.   Cardiovascular:      Rate and Rhythm: Regular rhythm. Tachycardia present.      Pulses: Normal pulses.      Heart sounds: Normal heart sounds. No murmur heard.    No friction rub. No gallop.   Pulmonary:      Effort: Respiratory distress  present.      Breath sounds: No stridor. Rales present. No wheezing.      Comments: Diffusely decreased breath sounds  Chest:      Chest wall: No tenderness.   Musculoskeletal:         General: No swelling, tenderness or deformity. Normal range of motion.      Cervical back: Normal range of motion and neck supple. No rigidity or tenderness.      Right lower leg: No edema.      Left lower leg: No edema.   Lymphadenopathy:      Cervical: No cervical adenopathy.   Skin:     General: Skin is warm and dry.      Capillary Refill: Capillary refill takes less than 2 seconds.      Coloration: Skin is not jaundiced.      Findings: No bruising, erythema or rash.   Neurological:      General: No focal deficit present.      Mental Status: He is alert and oriented to person, place, and time.      Cranial Nerves: No cranial nerve deficit.      Sensory: No sensory deficit.      Motor: No weakness.   Psychiatric:         Mood and Affect: Mood normal.         Thought Content: Thought content normal.       Vents:  Oxygen Concentration (%): 28 (08/10/22 1203)    Lines/Drains/Airways       None                   Significant Labs:    CBC/Anemia Profile:  Recent Labs   Lab 08/10/22  0947 08/11/22  0410   WBC 8.93 6.94   HGB 12.8* 12.1*   HCT 41.2 38.1*    362   MCV 72* 73*   RDW 21.1* 20.8*        Chemistries:  Recent Labs   Lab 08/10/22  0947 08/11/22  0410    138   K 3.7 4.3   CL 95 97   CO2 28 30*   BUN 6 12   CREATININE 0.9 0.7   CALCIUM 8.6* 8.9   ALBUMIN 3.1* 2.8*   PROT 7.2 6.6   BILITOT 0.5 0.4   ALKPHOS 97 80   ALT 13 9*   AST 15 11       CMP:   Recent Labs   Lab 08/10/22  0947 08/11/22  0410    138   K 3.7 4.3   CL 95 97   CO2 28 30*   GLU 83 119*   BUN 6 12   CREATININE 0.9 0.7   CALCIUM 8.6* 8.9   PROT 7.2 6.6   ALBUMIN 3.1* 2.8*   BILITOT 0.5 0.4   ALKPHOS 97 80   AST 15 11   ALT 13 9*   ANIONGAP 15 11     All pertinent labs within the past 24 hours have been reviewed.    Significant Imaging:   I have  reviewed all pertinent imaging results/findings within the past 24 hours.

## 2022-08-11 NOTE — ASSESSMENT & PLAN NOTE
-Last TTE 1/2022 showed normal EF, grade I diastolic dysfunction, pulm HTN (50 mmHg)  -Pt. With SOB, no reported edema. CXR without pulm edema. BNP is noted to be significantly elevated at 926, however  -Plan to increase home lasix to 40 mg, repeat TTE. Monitor I&O's, can continue to diurese as needed

## 2022-08-11 NOTE — SUBJECTIVE & OBJECTIVE
Past Medical History:   Diagnosis Date    Asthma     Atrial fibrillation with rapid ventricular response     CHF (congestive heart failure)     COPD (chronic obstructive pulmonary disease)     home O2 at night only    Coronary artery disease     Hypertension     Lung nodule     Pneumonia     Seizures        Past Surgical History:   Procedure Laterality Date    ESOPHAGOGASTRODUODENOSCOPY N/A 2/11/2022    Procedure: EGD (ESOPHAGOGASTRODUODENOSCOPY);  Surgeon: Cain Ortega MD;  Location: [facility] ENDO ([address] [place] [address]);  Service: Endoscopy;  Laterality: N/A;    LEFT HEART CATHETERIZATION  4/23/2020    Procedure: Left heart cath;  Surgeon: Nic Pollack MD;  Location: [facility] CATH LAB;  Service: Cardiology;;       Review of patient's allergies indicates:   Allergen Reactions    Benazepril Swelling    Duoneb [ipratropium-albuterol]      Report minor Tremors, pt seems to be tolerating well.       No current facility-administered medications on file prior to encounter.     Current Outpatient Medications on File Prior to Encounter   Medication Sig    acetaminophen (TYLENOL) 325 MG tablet Take 2 tablets (650 mg total) by mouth every 8 (eight) hours as needed for Pain or Temperature greater than (100.5).    albuterol (PROVENTIL/VENTOLIN HFA) 90 mcg/actuation inhaler Inhale 1-2 puffs into the lungs every 6 (six) hours as needed for Wheezing. Rescue    albuterol-ipratropium (DUO-NEB) 2.5 mg-0.5 mg/3 mL nebulizer solution Take 3 mLs by nebulization every 4 (four) hours as needed for Wheezing or Shortness of Breath. Rescue    budesonide-glycopyr-formoterol (BREZTRI AEROSPHERE) 160-9-4.8 mcg/actuation HFAA Inhale 2 puffs into the lungs 2 (two) times daily. Rinse mouth after use. CONTROL INHALER    carvediloL (COREG) 12.5 MG tablet Take 1 tablet (12.5 mg total) by mouth 2 (two) times daily with meals.    ferrous sulfate 325 (65 FE) MG EC tablet Take 1 tablet (325 mg total) by mouth once daily.    nicotine (NICODERM CQ) 7 mg/24 hr Place 1  patch onto the skin once daily.    roflumilast (DALIRESP) 500 mcg Tab Start this after completing 1 month of 250mg daily. Take 1 tablet (500 mcg total) by mouth once daily.    roflumilast 250 mcg Tab Start this first. Take 1 tablet (250 mcg total) by mouth once daily.    furosemide (LASIX) 20 MG tablet Take 1 tablet (20 mg total) by mouth once daily.    pantoprazole (PROTONIX) 40 MG tablet Take 1 tablet (40 mg total) by mouth 2 (two) times daily.    [DISCONTINUED] tiotropium bromide (SPIRIVA RESPIMAT) 2.5 mcg/actuation inhaler Inhale 2 puffs into the lungs Daily. Controller     Family History       Problem Relation (Age of Onset)    Cancer Father    Hypertension Sister    Stroke Sister, Brother          Tobacco Use    Smoking status: Current Some Day Smoker     Packs/day: 0.25     Types: Cigarettes    Smokeless tobacco: Never Used    Tobacco comment: 1-2 per day   Substance and Sexual Activity    Alcohol use: Yes     Alcohol/week: 2.0 standard drinks     Types: 2 Cans of beer per week     Comment: every night    Drug use: No    Sexual activity: Not Currently     Review of Systems   Constitutional:  Positive for activity change. Negative for chills, fever and unexpected weight change.   HENT:  Negative for congestion and sore throat.    Respiratory:  Positive for cough, chest tightness, shortness of breath and wheezing.    Cardiovascular:  Negative for chest pain, palpitations and leg swelling.   Gastrointestinal:  Negative for abdominal pain, blood in stool, nausea and vomiting.   Genitourinary:  Negative for dysuria and hematuria.   Musculoskeletal:  Negative for arthralgias and neck pain.   Skin:  Negative for color change and rash.   Neurological:  Negative for dizziness, seizures and numbness.   Psychiatric/Behavioral:  Negative for hallucinations and suicidal ideas.    Objective:     Vital Signs (Most Recent):  Temp: 98.3 °F (36.8 °C) (08/10/22 0910)  Pulse: 91 (08/10/22 2001)  Resp: (!) 21 (08/10/22 2001)  BP:  (!) 146/76 (08/10/22 2001)  SpO2: 95 % (08/10/22 2001)   Vital Signs (24h Range):  Temp:  [98.3 °F (36.8 °C)] 98.3 °F (36.8 °C)  Pulse:  [] 91  Resp:  [21-28] 21  SpO2:  [86 %-100 %] 95 %  BP: (146-160)/(73-91) 146/76     Weight: 58.5 kg (129 lb)  Body mass index is 20.2 kg/m².    Physical Exam  Vitals reviewed.   Constitutional:       General: He is not in acute distress.     Appearance: He is well-developed.   HENT:      Head: Normocephalic and atraumatic.   Eyes:      Extraocular Movements: Extraocular movements intact.      Pupils: Pupils are equal, round, and reactive to light.   Neck:      Vascular: No JVD.      Trachea: No tracheal deviation.   Cardiovascular:      Rate and Rhythm: Normal rate and regular rhythm.      Heart sounds: No murmur heard.    No friction rub. No gallop.   Pulmonary:      Effort: Respiratory distress present.      Breath sounds: No wheezing or rales.      Comments: Diffusely decreased breath sounds  Abdominal:      General: Bowel sounds are normal. There is no distension.      Palpations: Abdomen is soft. There is no mass.      Tenderness: There is no abdominal tenderness.   Musculoskeletal:         General: No deformity.      Cervical back: Neck supple.   Lymphadenopathy:      Cervical: No cervical adenopathy.   Skin:     General: Skin is warm and dry.      Findings: No rash.   Neurological:      Mental Status: He is alert and oriented to person, place, and time.       CRANIAL NERVES     CN III, IV, VI   Pupils are equal, round, and reactive to light.     Significant Labs: All pertinent labs within the past 24 hours have been reviewed.    Significant Imaging: I have reviewed all pertinent imaging results/findings within the past 24 hours.

## 2022-08-12 VITALS
OXYGEN SATURATION: 91 % | SYSTOLIC BLOOD PRESSURE: 127 MMHG | HEIGHT: 67 IN | DIASTOLIC BLOOD PRESSURE: 82 MMHG | WEIGHT: 129 LBS | RESPIRATION RATE: 17 BRPM | HEART RATE: 66 BPM | TEMPERATURE: 98 F | BODY MASS INDEX: 20.25 KG/M2

## 2022-08-12 PROBLEM — I50.33 ACUTE ON CHRONIC DIASTOLIC HEART FAILURE: Status: ACTIVE | Noted: 2021-05-21

## 2022-08-12 PROCEDURE — 99217 PR OBSERVATION CARE DISCHARGE: CPT | Mod: 25,,, | Performed by: PHYSICIAN ASSISTANT

## 2022-08-12 PROCEDURE — G0378 HOSPITAL OBSERVATION PER HR: HCPCS

## 2022-08-12 PROCEDURE — 96375 TX/PRO/DX INJ NEW DRUG ADDON: CPT

## 2022-08-12 PROCEDURE — 63600175 PHARM REV CODE 636 W HCPCS: Performed by: PHYSICIAN ASSISTANT

## 2022-08-12 PROCEDURE — 25000003 PHARM REV CODE 250: Performed by: INTERNAL MEDICINE

## 2022-08-12 PROCEDURE — 94761 N-INVAS EAR/PLS OXIMETRY MLT: CPT

## 2022-08-12 PROCEDURE — 25000242 PHARM REV CODE 250 ALT 637 W/ HCPCS: Performed by: HOSPITALIST

## 2022-08-12 PROCEDURE — 99217 PR OBSERVATION CARE DISCHARGE: ICD-10-PCS | Mod: 25,,, | Performed by: PHYSICIAN ASSISTANT

## 2022-08-12 PROCEDURE — 63600175 PHARM REV CODE 636 W HCPCS: Performed by: HOSPITALIST

## 2022-08-12 PROCEDURE — 94640 AIRWAY INHALATION TREATMENT: CPT

## 2022-08-12 PROCEDURE — 27000221 HC OXYGEN, UP TO 24 HOURS

## 2022-08-12 PROCEDURE — 99406 BEHAV CHNG SMOKING 3-10 MIN: CPT | Mod: ,,, | Performed by: PHYSICIAN ASSISTANT

## 2022-08-12 PROCEDURE — 99406 PR TOBACCO USE CESSATION INTERMEDIATE 3-10 MINUTES: ICD-10-PCS | Mod: ,,, | Performed by: PHYSICIAN ASSISTANT

## 2022-08-12 PROCEDURE — 99900035 HC TECH TIME PER 15 MIN (STAT)

## 2022-08-12 PROCEDURE — 94664 DEMO&/EVAL PT USE INHALER: CPT | Mod: XB

## 2022-08-12 PROCEDURE — 63700000 PHARM REV CODE 250 ALT 637 W/O HCPCS: Performed by: PHYSICIAN ASSISTANT

## 2022-08-12 PROCEDURE — 96374 THER/PROPH/DIAG INJ IV PUSH: CPT

## 2022-08-12 PROCEDURE — 94640 AIRWAY INHALATION TREATMENT: CPT | Mod: XB

## 2022-08-12 PROCEDURE — 25000003 PHARM REV CODE 250: Performed by: HOSPITALIST

## 2022-08-12 RX ORDER — FUROSEMIDE 20 MG/1
40 TABLET ORAL DAILY
Qty: 60 TABLET | Refills: 11 | Status: ON HOLD | OUTPATIENT
Start: 2022-08-12 | End: 2022-10-20 | Stop reason: SDUPTHER

## 2022-08-12 RX ORDER — PREDNISONE 20 MG/1
40 TABLET ORAL DAILY
Qty: 6 TABLET | Refills: 0 | Status: SHIPPED | OUTPATIENT
Start: 2022-08-12 | End: 2022-08-15

## 2022-08-12 RX ORDER — AZITHROMYCIN 500 MG/1
500 TABLET, FILM COATED ORAL
Qty: 36 TABLET | Refills: 0 | Status: ON HOLD | OUTPATIENT
Start: 2022-08-12 | End: 2022-09-13 | Stop reason: SDUPTHER

## 2022-08-12 RX ORDER — FUROSEMIDE 10 MG/ML
20 INJECTION INTRAMUSCULAR; INTRAVENOUS ONCE
Status: COMPLETED | OUTPATIENT
Start: 2022-08-12 | End: 2022-08-12

## 2022-08-12 RX ORDER — NICOTINE 7MG/24HR
1 PATCH, TRANSDERMAL 24 HOURS TRANSDERMAL DAILY
Qty: 14 PATCH | Refills: 0 | Status: ON HOLD | OUTPATIENT
Start: 2022-08-12 | End: 2022-09-13 | Stop reason: SDUPTHER

## 2022-08-12 RX ORDER — AZITHROMYCIN 250 MG/1
500 TABLET, FILM COATED ORAL ONCE
Status: COMPLETED | OUTPATIENT
Start: 2022-08-12 | End: 2022-08-12

## 2022-08-12 RX ADMIN — CARVEDILOL 12.5 MG: 12.5 TABLET, FILM COATED ORAL at 05:08

## 2022-08-12 RX ADMIN — FUROSEMIDE 20 MG: 10 INJECTION, SOLUTION INTRAMUSCULAR; INTRAVENOUS at 11:08

## 2022-08-12 RX ADMIN — IPRATROPIUM BROMIDE AND ALBUTEROL SULFATE 3 ML: 2.5; .5 SOLUTION RESPIRATORY (INHALATION) at 12:08

## 2022-08-12 RX ADMIN — FUROSEMIDE 40 MG: 20 TABLET ORAL at 09:08

## 2022-08-12 RX ADMIN — PREDNISONE 40 MG: 20 TABLET ORAL at 09:08

## 2022-08-12 RX ADMIN — AZITHROMYCIN MONOHYDRATE 500 MG: 250 TABLET ORAL at 11:08

## 2022-08-12 RX ADMIN — FERROUS SULFATE TAB 325 MG (65 MG ELEMENTAL FE) 1 EACH: 325 (65 FE) TAB at 09:08

## 2022-08-12 RX ADMIN — CARVEDILOL 12.5 MG: 12.5 TABLET, FILM COATED ORAL at 09:08

## 2022-08-12 RX ADMIN — IPRATROPIUM BROMIDE AND ALBUTEROL SULFATE 3 ML: 2.5; .5 SOLUTION RESPIRATORY (INHALATION) at 09:08

## 2022-08-12 RX ADMIN — ROFLUMILAST 500 MCG: 500 TABLET ORAL at 09:08

## 2022-08-12 RX ADMIN — PANTOPRAZOLE SODIUM 40 MG: 40 TABLET, DELAYED RELEASE ORAL at 09:08

## 2022-08-12 RX ADMIN — CALCIUM CARBONATE (ANTACID) CHEW TAB 500 MG 500 MG: 500 CHEW TAB at 11:08

## 2022-08-12 NOTE — HOSPITAL COURSE
Mr. Mccray was admitted to hospital medicine for acute on chronic respiratory failure in setting of COPD exacerbation. Initial pH 7.299 w/ pCO2 76.8 consistent with hypercapnic respiratory failure, placed on BIPAP and repeat VBG improved, weaned to home oxygen. Started on scheduled breathing treatments and prednisone. Pulmonology consulted for recurrent COPD exacerbations, patient is using LABA more than recommended dosage as is in [facility] may be leading to bronchospasm. Patient educated on proper use. Recommend azithromycin 500 mg MWF and follow up in clinic. Patient with elevated BNP of 926 (prior normal) and repeat echo with EF 55%, indeterminate diastolic function, abnormal septal wall motion, intermediate CVP (8 mmHg) and known interatrial septal aneurysm. Home lasix increased to 40 mg and patient given one time dose of IV lasix with significant UOP and improvement in SOB. Patient discharged on prednisone to complete 5 day course, azithromycin MWF, and lasix 40 mg daily. Educated on smoking cessation, nicotine patches ordered and and referral placed. BMP in 1 week. Referral to COPD clinic placed. PCP follow up in 1 week.

## 2022-08-12 NOTE — ASSESSMENT & PLAN NOTE
- patient educated on complete smoking cessation given severe COPD  - currently down to 1-2 cigarettes a day  - nicotine patch while inpatient, discharged with 30 day supply  - greater than >3 minutes spent discussing cessation  - referral to cessation placed

## 2022-08-12 NOTE — CARE UPDATE
S:  Patient was seen and examined this am. Reports improvement in shortness of breath- now on home oxygen settings. Reports cough- productive, green sputum. Reviewed medical records- has had several hospitalizations for COPD exacerbation. No nausea, vomiting, fevers, chills, night sweats, chest pain, LE edema or swelling.     Was notified by nurse (after my visit/encounter), that patient was complaining of abd pain. No h/o constipation or diarrhea. KUB ordered.     O:  Physical Exam  Vitals:    08/11/22 1600   BP: (!) 148/79   Pulse: 75   Resp: 18   Temp: 97.8 °F (36.6 °C)       Gen: in NAD, appears stated age, appears acutely ill  Neuro: AAOx3, motor, sensory, and strength grossly intact BL  HEENT: NTNC, EOMI, PERRL, MMM  CVS: RRR, no m/r/g; S1/S2 auscultated with no S3 or S4; capillary refill < 2 sec  Resp: BL rales, 2L BNC, subcostal retractions, can speak in full sentences, however.  Abd: BS+ in all 4 quadrants; NTND, soft to palpation; no organomegaly appreciated   Extrem: pulses full, equal, and regular over all 4 extremities; no UE or LE edema BL    Labs:  Recent Labs   Lab 08/11/22  0410      K 4.3   CL 97   CO2 30*   BUN 12   CREATININE 0.7      Recent Labs   Lab 08/11/22  0410   WBC 6.94   RBC 5.22   HGB 12.1*   HCT 38.1*      MCV 73*   MCH 23.2*   MCHC 31.8*        Imaging:  Imaging Results          X-Ray Chest 1 View (Final result)  Result time 08/10/22 10:35:12    Final result by Hung Jones MD (08/10/22 10:35:12)                 Impression:      See above      Electronically signed by: Hung Jones MD  Date:    08/10/2022  Time:    10:35             Narrative:    EXAMINATION:  XR CHEST 1 VIEW    CLINICAL HISTORY:  shortness of breath;    TECHNIQUE:  Single frontal view of the chest was performed.    COMPARISON:  07/22/2022    FINDINGS:  Cardiac size is normal.  Except for a few fibrotic streaks in the left lung lungs are clear.  No infiltrate is noted.                                   A&P:  Baltazar Mccray is a 62 y.o.M w/PMHx of COPD (on home O2- 2L, and bipap at bedtime), HTN, afib (no current AC use) who presented with acute on chronic hypoxic and hypercapnic respiratory failure 2/2 COPD exacerbation.     - Pulm consulted due to frequent hospitalizations- appreciate assistance  - CTA ordered to r/o PE in setting of Afib with no current AC use - CHADSVASC score of 2)- will discuss with patient need to start eliquis prior to DC  - Continue prednisone 40mg qday, duonebs q6h, home inhalers  - Monitor O2 saturations  - Counseled on smoking cessation  - KUB for evaluation of abd pain (likely musculoskeletal in nature)            Marilee Jackson MD  Department of Hospital Medicine  Department of Pediatrics  08/11/2022

## 2022-08-12 NOTE — DISCHARGE SUMMARY
[facility] ([place] [address])  [facility] Medicine  Discharge Summary      Patient Name: Baltazar Mccray  MRN: [id number]  Patient Class: OP- Observation  Admission Date: 8/10/2022  Hospital Length of Stay: 0 days  Discharge Date and Time: 8/12/2022  6:47 PM  Attending Physician: Hannah Hampton MD   Discharging Provider: Jenn Rucker PA-C  Primary Care Provider: [facility] Medicine Team: [facility] MED  Jenn Rucker PA-C    HPI:   61 yo M with PMHx Afib, HTN, and COPD with chronic hypoxia/hypercapnea (2 L NC/Bipap QHS at home) who presents to the ED complaining of worsening SOB x1 day. Pt. Reports that he started to feel OSB yesterday evening. He tried his home inhalers and Bipap all night, but he still felt very SOB this morning, and his symptoms only got progressively worse. He reports singifcant chest pain and cough, but he deneis any new sputum production, fevers, or chills. Pt. Contacted EMS and they reported that his O2 saturations were low on arrival, requiring 6L to maintain his SpO2. Pt. Placed on BIpap on arrival. He was able to be weaned to his home 2L NC after a few hours and remains stable on this at time of my assessment.      * No surgery found *      Hospital Course:   Mr. Mccray was admitted to hospital medicine for acute on chronic respiratory failure in setting of COPD exacerbation. Initial pH 7.299 w/ pCO2 76.8 consistent with hypercapnic respiratory failure, placed on BIPAP and repeat VBG improved, weaned to home oxygen. Started on scheduled breathing treatments and prednisone. Pulmonology consulted for recurrent COPD exacerbations, patient is using LABA more than recommended dosage as is in [facility] may be leading to bronchospasm. Patient educated on proper use. Recommend azithromycin 500 mg MWF and follow up in clinic. Patient with elevated BNP of 926 (prior normal) and repeat echo with EF 55%, indeterminate diastolic function, abnormal septal wall  motion, intermediate CVP (8 mmHg) and known interatrial septal aneurysm. Home lasix increased to 40 mg and patient given one time dose of IV lasix with significant UOP and improvement in SOB. Patient discharged on prednisone to complete 5 day course, azithromycin MWF, and lasix 40 mg daily. Educated on smoking cessation, nicotine patches ordered and and referral placed. BMP in 1 week. Referral to COPD clinic placed. PCP follow up in 1 week.        Goals of Care Treatment Preferences:  Code Status: Full Code      Consults:   Consults (From admission, onward)          Status Ordering Provider     Inpatient consult to PICC team (ROSEMARIE)  Once        Provider:  (Not yet assigned)    Completed ANUM PALMA     Inpatient consult to Pulmonology  Once        Provider:  (Not yet assigned)    Completed ANUM PALMA            * Acute on chronic respiratory failure with hypoxia and hypercapnia  COPD  -Pt. Presenting SOB, decreased SpO2 on home O2 and requiring Bipap on presentation. Poor air movement on exam. Initial pH 7.299 w/ pCO2 76.8 consistent with hypercapnic respiratory failure in setting of pt. Known severe COPD  -Bipap able to be weaned and repeat VBG improved. Continue supplemental O2 to maintain O2 sats >90%  -PO steroids, scheduled duonebs and PO azithromycin ordered for COPD exacerbation treatment  - pulm consulted: Majority of visit educating patient on medications.  Using [facility] more than recommneded dosage as [facility]   - azithro MWF  - discharged on prednisone to complete 5 day course, and MWF azithro  - f/u with pulm      Acute on chronic diastolic heart failure  -Last TTE 1/2022 showed normal EF, grade I diastolic dysfunction, pulm HTN (50 mmHg)  -Pt. With SOB, no reported edema. CXR without pulm edema. BNP is noted to be significantly elevated at 926, however  -Plan to increase home lasix to 40 mg, repeat TTE. Monitor I&O's, can continue to diurese as needed  -  repeat echo with increased CVP, see below  - given lasix IV 20 mg X 1 and started lasix 40 mg with good UOP  - discharged on lasix 40 mg daily  - BMP 1 week      Results for orders placed during the hospital encounter of 08/10/22    Echo    Interpretation Summary  · The left ventricle is normal in size with concentric remodeling and normal systolic function.  · The estimated ejection fraction is 55%.  · Indeterminate left ventricular diastolic function.  · There is abnormal septal wall motion.  · Normal right ventricular size with normal right ventricular systolic function.  · Intermediate central venous pressure (8 mmHg).  · Severe left atrial enlargement.  · An interatrial septal aneurysm is present.        Tobacco abuse  - patient educated on complete smoking cessation given severe COPD  - currently down to 1-2 cigarettes a day  - nicotine patch while inpatient, discharged with 30 day supply  - greater than >3 minutes spent discussing cessation  - referral to cessation placed        Final Active Diagnoses:    Diagnosis Date Noted POA    PRINCIPAL PROBLEM:  Acute on chronic respiratory failure with hypoxia and hypercapnia [J96.21, J96.22] 01/29/2020 Yes    Atrial fibrillation [I48.91] 03/05/2022 Yes    Acute on chronic diastolic heart failure [I50.33] 05/21/2021 Yes    Tobacco abuse [Z72.0] 08/12/2018 Yes     Chronic    Essential hypertension [I10] 05/19/2017 Yes     Chronic    COPD exacerbation [J44.1] 05/14/2017 Yes      Problems Resolved During this Admission:       Discharged Condition: good    Disposition: Home or Self Care    Follow Up:   Follow-up Information       [facility] Follow up.    Contact information:  [address]  [facility] [address]  [phone]               [facility] PULMONARY MEDICINE Follow up.    Specialty: Pulmonology  Contact information:  [address]  [facility] [address]  [phone]                         Patient Instructions:      Basic  Metabolic Panel   Standing Status: Future Standing Exp. Date: 10/11/23   Scheduling Instructions: Please fax results to PCP     Ambulatory referral/consult to COPD Discharge Clinic   Standing Status: Future   Referral Priority: Routine Referral Type: Consultation   Referral Reason: Specialty Services Required   Requested Specialty: Pulmonary Disease   Number of Visits Requested: 1     Ambulatory referral/consult to Smoking Cessation Program   Standing Status: Future   Referral Priority: Routine Referral Type: Consultation   Referral Reason: Specialty Services Required   Requested Specialty: CTTS   Number of Visits Requested: 1     Ambulatory referral/consult to Smoking Cessation Program   Standing Status: Future   Referral Priority: Routine Referral Type: Consultation   Referral Reason: Specialty Services Required   Requested Specialty: CTTS   Number of Visits Requested: 1     Ambulatory referral/consult to [facility]   Standing Status: Future   Referral Priority: Routine Referral Type: Consultation   Referral Reason: Specialty Services Required   Requested Specialty: Hospitalist   Number of Visits Requested: 1     Diet Cardiac     Notify your health care provider if you experience any of the following:  temperature >100.4     Notify your health care provider if you experience any of the following:  increased confusion or weakness     Notify your health care provider if you experience any of the following:  persistent nausea and vomiting or diarrhea     Activity as tolerated       Significant Diagnostic Studies: Labs:   CMP   Recent Labs   Lab 08/11/22  0410      K 4.3   CL 97   CO2 30*   *   BUN 12   CREATININE 0.7   CALCIUM 8.9   PROT 6.6   ALBUMIN 2.8*   BILITOT 0.4   ALKPHOS 80   AST 11   ALT 9*   ANIONGAP 11    and CBC   Recent Labs   Lab 08/11/22  0410   WBC 6.94   HGB 12.1*   HCT 38.1*        Cardiac Graphics: Echocardiogram:   Transthoracic echo (TTE)  "complete (Cupid Only):   Results for orders placed or performed during the hospital encounter of 08/10/22   Echo   Result Value Ref Range    BSA 1.66 m2    TDI SEPTAL 0.07 m/s    LV LATERAL E/E' RATIO 7.00 m/s    LV SEPTAL E/E' RATIO 9.00 m/s    LA WIDTH 4.52 cm    TDI LATERAL 0.09 m/s    LVIDd 3.96 3.5 - 6.0 cm    IVS 0.93 0.6 - 1.1 cm    Posterior Wall 0.89 0.6 - 1.1 cm    LVIDs 2.84 2.1 - 4.0 cm    FS 28 28 - 44 %    LA volume 90.63 cm3    Sinus 3.33 cm    STJ 2.48 cm    Ascending aorta 2.10 cm    LV mass 109.59 g    LA size 4.59 cm    RVDD 2.94 cm    TAPSE 1.44 cm    Left Ventricle Relative Wall Thickness 0.45 cm    AV mean gradient 4 mmHg    AV valve area 3.46 cm2    AV Velocity Ratio 0.71     AV index (prosthetic) 0.77     MV valve area p 1/2 method 3.80 cm2    E/A ratio 0.82     Mean e' 0.08 m/s    E wave deceleration time 199.72 msec    MV "A" wave duration 11.04 msec    Pulm vein S/D ratio 1.18     LVOT diameter 2.39 cm    LVOT area 4.5 cm2    LVOT peak alli 0.93 m/s    LVOT peak VTI 20.41 cm    Ao peak alli 1.31 m/s    Ao VTI 26.43 cm    LVOT stroke volume 91.52 cm3    AV peak gradient 7 mmHg    E/E' ratio 7.88 m/s    MV Peak E Alli 0.63 m/s    MV stenosis pressure 1/2 time 57.92 ms    MV Peak A Alli 0.77 m/s    PV Peak S Alli 0.47 m/s    PV Peak D Alli 0.40 m/s    LV Systolic Volume 30.49 mL    LV Systolic Volume Index 18.1 mL/m2    LV Diastolic Volume 68.47 mL    LV Diastolic Volume Index 40.76 mL/m2    LA Volume Index 53.9 mL/m2    LV Mass Index 65 g/m2    RA Major Axis 3.88 cm    Left Atrium Minor Axis 5.32 cm    Left Atrium Major Axis 4.97 cm    LA Volume Index (Mod) 46.1 mL/m2    LA volume (mod) 77.39 cm3    RA Width 3.69 cm    Right Atrial Pressure (from IVC) 8 mmHg    EF 55 %    Narrative    · The left ventricle is normal in size with concentric remodeling and   normal systolic function.  · The estimated ejection fraction is 55%.  · Indeterminate left ventricular diastolic function.  · There is abnormal " septal wall motion.  · Normal right ventricular size with normal right ventricular systolic   function.  · Intermediate central venous pressure (8 mmHg).  · Severe left atrial enlargement.  · An interatrial septal aneurysm is present.          Pending Diagnostic Studies:       None           Medications:  Reconciled Home Medications:      Medication List        START taking these medications      azithromycin 500 MG tablet  Commonly known as: ZITHROMAX  Take 1 tablet (500 mg total) by mouth every Mon, Wed, Fri.     predniSONE 20 MG tablet  Commonly known as: DELTASONE  Take 2 tablets (40 mg total) by mouth once daily. for 3 days            CHANGE how you take these medications      furosemide 20 MG tablet  Commonly known as: LASIX  Take 2 tablets (40 mg total) by mouth once daily.  What changed: how much to take     * nicotine 7 mg/24 hr  Commonly known as: NICODERM CQ  Place 1 patch onto the skin once daily.  What changed: Another medication with the same name was added. Make sure you understand how and when to take each.     * nicotine 7 mg/24 hr  Commonly known as: NICODERM CQ  Place 1 patch onto the skin once daily.  What changed: You were already taking a medication with the same name, and this prescription was added. Make sure you understand how and when to take each.     roflumilast 250 mcg Tab  Start this first. Take 1 tablet (250 mcg total) by mouth once daily.  What changed: Another medication with the same name was removed. Continue taking this medication, and follow the directions you see here.           * This list has 2 medication(s) that are the same as other medications prescribed for you. Read the directions carefully, and ask your doctor or other care provider to review them with you.                CONTINUE taking these medications      acetaminophen 325 MG tablet  Commonly known as: TYLENOL  Take 2 tablets (650 mg total) by mouth every 8 (eight) hours as needed for Pain or Temperature greater  than (100.5).     albuterol 90 mcg/actuation inhaler  Commonly known as: PROVENTIL/VENTOLIN HFA  Inhale 1-2 puffs into the lungs every 6 (six) hours as needed for Wheezing. Rescue     albuterol-ipratropium 2.5 mg-0.5 mg/3 mL nebulizer solution  Commonly known as: DUO-NEB  Take 3 mLs by nebulization every 4 (four) hours as needed for Wheezing or Shortness of Breath. Rescue     BREZTRI AEROSPHERE 160-9-4.8 mcg/actuation Hfaa  Generic drug: budesonide-glycopyr-formoterol  Inhale 2 puffs into the lungs 2 (two) times daily. Rinse mouth after use. CONTROL INHALER     carvediloL 12.5 MG tablet  Commonly known as: COREG  Take 1 tablet (12.5 mg total) by mouth 2 (two) times daily with meals.     ferrous sulfate 325 (65 FE) MG EC tablet  Take 1 tablet (325 mg total) by mouth once daily.     pantoprazole 40 MG tablet  Commonly known as: PROTONIX  Take 1 tablet (40 mg total) by mouth 2 (two) times daily.            STOP taking these medications      SPIRIVA RESPIMAT 2.5 mcg/actuation inhaler  Generic drug: tiotropium bromide              Indwelling Lines/Drains at time of discharge:   Lines/Drains/Airways       None                   Time spent on the discharge of patient: 40 minutes         Jenn Rucker PA-C  Department of Hospital Medicine  [facility] Intensive [facility] ([place])

## 2022-08-12 NOTE — ASSESSMENT & PLAN NOTE
-Last TTE 1/2022 showed normal EF, grade I diastolic dysfunction, pulm HTN (50 mmHg)  -Pt. With SOB, no reported edema. CXR without pulm edema. BNP is noted to be significantly elevated at 926, however  -Plan to increase home lasix to 40 mg, repeat TTE. Monitor I&O's, can continue to diurese as needed  - repeat echo with increased CVP, see below  - given lasix IV 20 mg X 1 and started lasix 40 mg with good UOP  - discharged on lasix 40 mg daily  - BMP 1 week      Results for orders placed during the hospital encounter of 08/10/22    Echo    Interpretation Summary  · The left ventricle is normal in size with concentric remodeling and normal systolic function.  · The estimated ejection fraction is 55%.  · Indeterminate left ventricular diastolic function.  · There is abnormal septal wall motion.  · Normal right ventricular size with normal right ventricular systolic function.  · Intermediate central venous pressure (8 mmHg).  · Severe left atrial enlargement.  · An interatrial septal aneurysm is present.

## 2022-08-12 NOTE — PLAN OF CARE
Patient Axo4, POC discussed with patient, verbalized understanding. 1L NC, bipap qhs. Tolerating PO, with no issues noted. C/o of heartburn, tums given. No complaints of pain. Independent. No signs of distress. Safety measures maintained during shift.       Problem: Adult Inpatient Plan of Care  Goal: Plan of Care Review  Outcome: Ongoing, Progressing  Goal: Patient-Specific Goal (Individualized)  Outcome: Ongoing, Progressing  Goal: Absence of Hospital-Acquired Illness or Injury  Outcome: Ongoing, Progressing  Goal: Optimal Comfort and Wellbeing  Outcome: Ongoing, Progressing  Goal: Readiness for Transition of Care  Outcome: Ongoing, Progressing

## 2022-08-12 NOTE — ASSESSMENT & PLAN NOTE
COPD  -Pt. Presenting SOB, decreased SpO2 on home O2 and requiring Bipap on presentation. Poor air movement on exam. Initial pH 7.299 w/ pCO2 76.8 consistent with hypercapnic respiratory failure in setting of pt. Known severe COPD  -Bipap able to be weaned and repeat VBG improved. Continue supplemental O2 to maintain O2 sats >90%  -PO steroids, scheduled duonebs and PO azithromycin ordered for COPD exacerbation treatment  - pulm consulted: Majority of visit educating patient on medications.  Using LABA more than recommneded dosage as is in Breztri may be leading to bronchospasm   - veronica MWF  - discharged on prednisone to complete 5 day course, and MWF azithro  - f/u with pulm

## 2022-08-14 NOTE — PLAN OF CARE
Plan of Care Update ED U-Turn    Patient Name: Baltazar Mccray           Goals:     Mr Woodard last ER visit- 8-10-22         See your PCP for follow up per discussion with your E-D provider -see AVS from [id number]  See your pulmonologist and cardiologist as discussed  Active discussions with your providers to increase your medical diagnosis needs and compliance  DME equipment to be monitored -repaired per protocol  Maintain stable and appropriate housing (homeless in the past)  Actively maintain the [facility] home [facility] program and keep all appt's scheduled w providers  U-turn/high utilizer team to assist as needed

## 2022-08-16 ENCOUNTER — DOCUMENTATION ONLY (OUTPATIENT)
Dept: CASE MANAGEMENT | Facility: HOSPITAL | Age: 62
End: 2022-08-16
Payer: MEDICARE

## 2022-08-18 ENCOUNTER — CARE AT HOME (OUTPATIENT)
Dept: HOME HEALTH SERVICES | Facility: CLINIC | Age: 62
End: 2022-08-18
Payer: MEDICARE

## 2022-08-18 DIAGNOSIS — J44.1 COPD EXACERBATION: ICD-10-CM

## 2022-08-18 PROCEDURE — 99350 PR HOME VISIT,ESTAB PATIENT,LEVEL IV: ICD-10-PCS | Mod: S$GLB,,, | Performed by: NURSE PRACTITIONER

## 2022-08-18 PROCEDURE — 99350 HOME/RES VST EST HIGH MDM 60: CPT | Mod: S$GLB,,, | Performed by: NURSE PRACTITIONER

## 2022-08-22 NOTE — PROGRESS NOTES
[facility]  Transition of Care Home Visit    Visit Date: 8/18/22  Encounter Provider: Susan Rae   [facility]P:  [facility]    PRESENTING HISTORY      Patient ID: Baltazar Mccray is a 62 y.o. male.    Consult Requested By:  Dr. Naeem Mccloud  Reason for Consult:  Hospital Follow Up.    Baltazar is being seen at home due to being seen at home due to physical debility that presents a taxing effort to leave the home, to mitigate high risk of hospital readmission and/or due to the limited availability of reliable or safe options for transportation to the point of access to the provider. Prior to treatment on this visit the chart was reviewed and patient verbal consent was obtained.      Chief Complaint: Transitional Care        History of Present Illness: Mr. Baltazar Mccray is a 62 y.o. male who was recently admitted to the hospital.    Admission Date: 8/10/2022  Hospital Length of Stay: 0 days  Discharge Date and Time: 8/12/2022      HPI:   61 yo M with PMHx Afib, HTN, and COPD with chronic hypoxia/hypercapnea (2 L NC/Bipap QHS at home) who presents to the ED complaining of worsening SOB x1 day. Pt. Reports that he started to feel OSB yesterday evening. He tried his home inhalers and Bipap all night, but he still felt very SOB this morning, and his symptoms only got progressively worse. He reports singifcant chest pain and cough, but he deneis any new sputum production, fevers, or chills. Pt. Contacted EMS and they reported that his O2 saturations were low on arrival, requiring 6L to maintain his SpO2. Pt. Placed on BIpap on arrival. He was able to be weaned to his home 2L NC after a few hours and remains stable on this at time of my assessment.       Hospital Course:   Mr. Mccray was admitted to hospital medicine for acute on chronic respiratory failure in setting of COPD exacerbation. Initial pH 7.299 w/ pCO2 76.8 consistent with hypercapnic respiratory failure, placed on BIPAP and repeat VBG improved,  weaned to home oxygen. Started on scheduled breathing treatments and prednisone. Pulmonology consulted for recurrent COPD exacerbations, patient is using [facility] recommended [facility]. Patient educated on proper use. Recommend azithromycin 500 mg MWF and follow up in clinic. Patient with elevated BNP of 926 (prior normal) and repeat echo with EF 55%, indeterminate diastolic function, abnormal septal wall motion, intermediate CVP (8 mmHg) and known interatrial septal aneurysm. Home lasix increased to 40 mg and patient given one time dose of IV lasix with significant UOP and improvement in SOB. Patient discharged on prednisone to complete 5 day course, azithromycin MWF, and lasix 40 mg daily. Educated on smoking cessation, nicotine patches ordered and and referral placed. BMP in 1 week. Referral to COPD clinic placed. PCP follow up in 1 week.   _________________________________________________________  Today:Mr. Mccray is being evaluated at home for a transition of care visit s/p hospitalization, see above.     With this visit today patient is found sitting up on sofa with his continuous oxygen in progress. Patient is AAOx3, able to verify his name and . Most of patients other history was received from his sister over the phone during the visit and his medical record. He currently does not have HH and denies the need. He sees [facility] as his [facility]. I will continue seeing the patient in the home as it is difficult for him to physically make visits. He endorses eating x 3 small meals per day with Premiere protein as supplemental drinks for meal replacements. He endorses regular BMs with prn colace and miralax for occasional constipation.      Oxygen precautions reinforced. Education completed ~ 15 minutes. Plan to follow up.     VSS. Denies fever, chest pain, shortness of breath, nausea, vomiting, diarrhea. Risks of environmental exposure to coronavirus  discussed including: social distancing, hand hygiene, and limiting departures from the home for necessities only.  Reports understanding and willingness to comply.  All hospital discharge orders reviewed and being followed, all medications reconciled and reviewed, patient and family verbalized understanding. No other needs identified at this time.     I initiated the process of advance care planning today and explained the importance of this process to the patient and family.  I introduced the concept of advance directives to the patient, as well. Then the patient received detailed information about the importance of designating a Health Care Power of  (HCPOA). She was also instructed to communicate with this person about his wishes for future healthcare, should he become sick and lose decision-making capacity. FULL CODE STATUS    I Introduced [place] form with patient/family, explaining this is the patient's wishes, and this form will be uploaded into the patient's [facility] and the [place] Registry.     We spoke about ACP for 20 minutes.    Attestation: Screening criteria to assess the level of the patient's risk for infection with COVID-19 as recommended by the CDC at the time of the above documented home visit concluded appropriateness to proceed. Universal precautions were maintained at all times, including provider use of 60% alcohol gel hand  immediately prior to entry and upon departing the patient's home.      Review of Systems   Constitutional:  Negative for fatigue and unexpected weight change.   HENT:  Negative for congestion.    Eyes:  Negative for redness and visual disturbance.   Respiratory:  Positive for shortness of breath. Negative for cough.    Cardiovascular:  Negative for chest pain and palpitations.   Gastrointestinal:  Negative for abdominal distention, nausea and vomiting.   Endocrine: Negative for polydipsia and polyuria.   Genitourinary:  Negative for difficulty  urinating.   Musculoskeletal:  Negative for arthralgias and gait problem.   Skin:  Negative for color change.   Allergic/Immunologic: Negative for food allergies.   Neurological:  Negative for dizziness and weakness.   Psychiatric/Behavioral:  Negative for agitation.      Assessments:  Environmental: Lives in apartments, 1st floor  Functional Status: Independent  Safety: Fall precautions  Nutritional: n/a  Home Health/DME/Supplies: n/a    PAST HISTORY:     Past Medical History:   Diagnosis Date    Asthma     Atrial fibrillation with rapid ventricular response     CHF (congestive heart failure)     COPD (chronic obstructive pulmonary disease)     home O2 at night only    Coronary artery disease     Hypertension     Lung nodule     Pneumonia     Seizures        Past Surgical History:   Procedure Laterality Date    ESOPHAGOGASTRODUODENOSCOPY N/A 2/11/2022    Procedure: EGD (ESOPHAGOGASTRODUODENOSCOPY);  Surgeon: Cain Ortega MD;  Location: [facility] ENDO ([address] [place] [address]);  Service: Endoscopy;  Laterality: N/A;    LEFT HEART CATHETERIZATION  4/23/2020    Procedure: Left heart cath;  Surgeon: Nic Pollack MD;  Location: [facility] CATH LAB;  Service: Cardiology;;       Family History   Problem Relation Age of Onset    Cancer Father     Hypertension Sister     Stroke Sister     Stroke Brother     Diabetes Neg Hx     Heart disease Neg Hx        Social History     Socioeconomic History    Marital status: Single   Tobacco Use    Smoking status: Some Days     Packs/day: 0.25     Types: Cigarettes    Smokeless tobacco: Never    Tobacco comments:     1-2 per day   Substance and Sexual Activity    Alcohol use: Yes     Alcohol/week: 2.0 standard drinks     Types: 2 Cans of beer per week     Comment: every night    Drug use: No    Sexual activity: Not Currently   Social History Narrative    Patient' nephew is currently living with him in his apartment. Patient's sister Viry ([phone]) helps manage patient's care.            6/3/21     Patient is no longer staying with family. Patient is currently staying at the [facility] and working on getting into their program for more supportive housing.      Social Determinants of Health     Financial Resource Strain: High Risk    Difficulty of Paying Living Expenses: Very hard   Food Insecurity: Food Insecurity Present    Worried About Running Out of Food in the Last Year: Often true    Ran Out of Food in the Last Year: Sometimes true   Transportation Needs: Unmet Transportation Needs    Lack of Transportation (Medical): Yes    Lack of Transportation (Non-Medical): Yes   Physical Activity: Inactive    Days of Exercise per Week: 0 days    Minutes of Exercise per Session: 0 min   Stress: Stress Concern Present    Feeling of Stress : To some extent   Social Connections: Socially Isolated    Frequency of Communication with Friends and Family: More than three times a week    Frequency of Social Gatherings with Friends and Family: More than three times a week    Attends Druze Services: Never    Active Member of Clubs or Organizations: No    Attends Club or Organization Meetings: Never    Marital Status: Never    Housing Stability: High Risk    Unable to Pay for Housing in the Last Year: Yes    Number of Places Lived in the Last Year: 3    Unstable Housing in the Last Year: Yes       MEDICATIONS & ALLERGIES:     Current Outpatient Medications on File Prior to Visit   Medication Sig Dispense Refill    acetaminophen (TYLENOL) 325 MG tablet Take 2 tablets (650 mg total) by mouth every 8 (eight) hours as needed for Pain or Temperature greater than (100.5).  0    albuterol (PROVENTIL/VENTOLIN HFA) 90 mcg/actuation inhaler Inhale 1-2 puffs into the lungs every 6 (six) hours as needed for Wheezing. Rescue 8.5 g 11    albuterol-ipratropium (DUO-NEB) 2.5 mg-0.5 mg/3 mL nebulizer solution Take 3 mLs by nebulization every 4 (four) hours as needed for Wheezing or Shortness of Breath. Rescue 180 mL 11     azithromycin (ZITHROMAX) 500 MG tablet Take 1 tablet (500 mg total) by mouth every Mon, Wed, Fri. 36 tablet 0    budesonide-glycopyr-formoterol (BREZTRI AEROSPHERE) 160-9-4.8 mcg/actuation HFAA Inhale 2 puffs into the lungs 2 (two) times daily. Rinse mouth after use. CONTROL INHALER 10.7 g 11    carvediloL (COREG) 12.5 MG tablet Take 1 tablet (12.5 mg total) by mouth 2 (two) times daily with meals. 60 tablet 11    ferrous sulfate 325 (65 FE) MG EC tablet Take 1 tablet (325 mg total) by mouth once daily. 30 tablet 1    furosemide (LASIX) 20 MG tablet Take 2 tablets (40 mg total) by mouth once daily. 60 tablet 11    nicotine (NICODERM CQ) 7 mg/24 hr Place 1 patch onto the skin once daily. 30 patch 1    nicotine (NICODERM CQ) 7 mg/24 hr Place 1 patch onto the skin once daily. 14 patch 0    pantoprazole (PROTONIX) 40 MG tablet Take 1 tablet (40 mg total) by mouth 2 (two) times daily. 60 tablet 1    [DISCONTINUED] tiotropium bromide (SPIRIVA RESPIMAT) 2.5 mcg/actuation inhaler Inhale 2 puffs into the lungs Daily. Controller 4 g 5     No current facility-administered medications on file prior to visit.        Review of patient's allergies indicates:   Allergen Reactions    Benazepril Swelling       OBJECTIVE:     Vital Signs:  Vitals:    08/29/22 1942   BP: 122/76   Pulse: 68   Resp: 18   Temp: 97.9 °F (36.6 °C)     Body mass index is 20.2 kg/m².     Physical Exam:  Physical Exam  HENT:      Head: Atraumatic.      Nose: Nose normal.   Cardiovascular:      Rate and Rhythm: Normal rate.   Pulmonary:      Effort: Pulmonary effort is normal.      Comments: Continuous supplemental oxygen in use 2L/NC  Abdominal:      General: Abdomen is flat.   Musculoskeletal:         General: Normal range of motion.   Skin:     General: Skin is warm and dry.      Capillary Refill: Capillary refill takes less than 2 seconds.   Neurological:      Mental Status: He is alert and oriented to person, place, and time.   Psychiatric:         Mood  and Affect: Mood normal.       Laboratory  Lab Results   Component Value Date    WBC 6.94 08/11/2022    HGB 12.1 (L) 08/11/2022    HCT 38.1 (L) 08/11/2022    MCV 73 (L) 08/11/2022     08/11/2022     Lab Results   Component Value Date    INR 1.0 03/14/2022    INR 1.0 04/19/2020    INR 1.0 01/03/2020     Lab Results   Component Value Date    HGBA1C 5.7 (H) 02/10/2022     No results for input(s): POCTGLUCOSE in the last 72 hours.    Diagnostic Results:  N/A    TRANSITION OF CARE:     [facility] Contact Note: 08/16/2022     Family and/or Caretaker present at visit?  No.  Diagnostic tests reviewed/disposition: No diagnosic tests pending after this hospitalization.  Disease/illness education: Fall precautions  Home health/community services discussion/referrals: Patient has home health established at n/a .   Establishment or re-establishment of referral orders for community resources:  n/a .   Discussion with other health care providers:  n/a .     Transition of Care Visit:  I have reviewed and updated the history and problem list.  I have reconciled the medication list.  I have discussed the hospitalization and current medical issues, prognosis and plans with the patient/family.  I  spent more than 50% of time discussing the care with the patient/family.  Total Face-to-Face Encounter: 60 minutes.    Medications Reconciliation:   I have reconciled the patient's home medications and discharge medications with the patient/family. I have updated all changes.  Refer to After-Visit Medication List.    ASSESSMENT & PLAN:     HIGH RISK CONDITION(S):  Patient has a condition that poses threat to life and bodily function: n/a    1. COPD exacerbation  -     Ambulatory referral/consult to [facility]    Continuous supplemental O2 via NC      Were controlled substances prescribed?  No      Patient Instructions Given:  - Continue all medications, treatments and therapies as ordered.   -  Follow all instructions, recommendations as discussed.  - Maintain Safety Precautions at all times.  - Attend all medical appointments as scheduled.  - For worsening symptoms: call Primary Care Physician or Nurse Practitioner.  - For emergencies, call 911 or immediately report to the nearest emergency room.    After Visit Medication List :     Medication List            Accurate as of August 18, 2022 11:59 PM. If you have any questions, ask your nurse or doctor.                CONTINUE taking these medications      acetaminophen 325 MG tablet  Commonly known as: TYLENOL  Take 2 tablets (650 mg total) by mouth every 8 (eight) hours as needed for Pain or Temperature greater than (100.5).     albuterol 90 mcg/actuation inhaler  Commonly known as: PROVENTIL/VENTOLIN HFA  Inhale 1-2 puffs into the lungs every 6 (six) hours as needed for Wheezing. Rescue     albuterol-ipratropium 2.5 mg-0.5 mg/3 mL nebulizer solution  Commonly known as: DUO-NEB  Take 3 mLs by nebulization every 4 (four) hours as needed for Wheezing or Shortness of Breath. Rescue     azithromycin 500 MG tablet  Commonly known as: ZITHROMAX  Take 1 tablet (500 mg total) by mouth every Mon, Wed, Fri.     BREZTRI AEROSPHERE 160-9-4.8 mcg/actuation Hfaa  Generic drug: budesonide-glycopyr-formoterol  Inhale 2 puffs into the lungs 2 (two) times daily. Rinse mouth after use. CONTROL INHALER     carvediloL 12.5 MG tablet  Commonly known as: COREG  Take 1 tablet (12.5 mg total) by mouth 2 (two) times daily with meals.     ferrous sulfate 325 (65 FE) MG EC tablet  Take 1 tablet (325 mg total) by mouth once daily.     furosemide 20 MG tablet  Commonly known as: LASIX  Take 2 tablets (40 mg total) by mouth once daily.     * nicotine 7 mg/24 hr  Commonly known as: NICODERM CQ  Place 1 patch onto the skin once daily.     * nicotine 7 mg/24 hr  Commonly known as: NICODERM CQ  Place 1 patch onto the skin once daily.     roflumilast 250 mcg Tab  Start this first. Take 1  tablet (250 mcg total) by mouth once daily.           * This list has 2 medication(s) that are the same as other medications prescribed for you. Read the directions carefully, and ask your doctor or other care provider to review them with you.                Future Appointments   Date Time Provider Department Center   9/1/2022  1:30 PM Susan MURPHY [facility]     Risks of environmental exposure to coronavirus discussed including: social distancing, hand hygiene, and limiting departures from the home for necessities only. Reports understanding and willingness to comply.     Signature:    Susan Rae, MSN, APRN, FNP-C  [facility]

## 2022-08-24 ENCOUNTER — TELEPHONE (OUTPATIENT)
Dept: ENDOSCOPY | Facility: HOSPITAL | Age: 62
End: 2022-08-24
Payer: MEDICARE

## 2022-08-29 VITALS
OXYGEN SATURATION: 97 % | TEMPERATURE: 98 F | BODY MASS INDEX: 20.25 KG/M2 | SYSTOLIC BLOOD PRESSURE: 122 MMHG | WEIGHT: 129 LBS | RESPIRATION RATE: 18 BRPM | HEIGHT: 67 IN | HEART RATE: 68 BPM | DIASTOLIC BLOOD PRESSURE: 76 MMHG

## 2022-08-30 NOTE — PATIENT INSTRUCTIONS
Instructions:  - [facility] to schedule home follow-up visit with patient in 4-6 weeks or as needed.  - Continue all medications, treatments and therapies as ordered.   - Follow all instructions, recommendations as discussed.  - Maintain Safety Precautions at all times.  - Attend all medical appointments as scheduled.  - For worsening symptoms: call Primary Care Physician or Nurse Practitioner.  - For emergencies, call 911 or immediately report to the nearest emergency room.  - Limit Risks of environmental exposure to coronavirus/COVID-19 as discussed including: social distancing, hand hygiene, and limiting departures from the home for necessities only.

## 2022-08-31 ENCOUNTER — HOSPITAL ENCOUNTER (OUTPATIENT)
Facility: HOSPITAL | Age: 62
Discharge: HOME | End: 2022-09-02
Attending: EMERGENCY MEDICINE | Admitting: HOSPITALIST
Payer: MEDICARE

## 2022-08-31 DIAGNOSIS — J44.9 COPD (CHRONIC OBSTRUCTIVE PULMONARY DISEASE): ICD-10-CM

## 2022-08-31 DIAGNOSIS — E87.1 HYPONATREMIA: ICD-10-CM

## 2022-08-31 DIAGNOSIS — R07.9 CHEST PAIN: ICD-10-CM

## 2022-08-31 DIAGNOSIS — J44.1 COPD EXACERBATION: ICD-10-CM

## 2022-08-31 DIAGNOSIS — I10 ESSENTIAL HYPERTENSION: Primary | Chronic | ICD-10-CM

## 2022-08-31 DIAGNOSIS — J44.9 CHRONIC OBSTRUCTIVE PULMONARY DISEASE, UNSPECIFIED COPD TYPE: ICD-10-CM

## 2022-08-31 DIAGNOSIS — Z60.9 POOR SOCIAL SITUATION: ICD-10-CM

## 2022-08-31 LAB
ALBUMIN SERPL BCP-MCNC: 2.9 G/DL (ref 3.5–5.2)
ALLENS TEST: ABNORMAL
ALP SERPL-CCNC: 81 U/L (ref 55–135)
ALT SERPL W/O P-5'-P-CCNC: 7 U/L (ref 10–44)
ANION GAP SERPL CALC-SCNC: 4 MMOL/L (ref 8–16)
AST SERPL-CCNC: 12 U/L (ref 10–40)
BASOPHILS # BLD AUTO: 0.08 K/UL (ref 0–0.2)
BASOPHILS NFR BLD: 0.9 % (ref 0–1.9)
BILIRUB SERPL-MCNC: 0.5 MG/DL (ref 0.1–1)
BNP SERPL-MCNC: 325 PG/ML (ref 0–99)
BUN SERPL-MCNC: 7 MG/DL (ref 6–20)
CALCIUM SERPL-MCNC: 8.8 MG/DL (ref 8.7–10.5)
CHLORIDE SERPL-SCNC: 97 MMOL/L (ref 95–110)
CO2 SERPL-SCNC: 30 MMOL/L (ref 23–29)
CREAT SERPL-MCNC: 0.8 MG/DL (ref 0.5–1.4)
DELSYS: ABNORMAL
DIFFERENTIAL METHOD: ABNORMAL
EOSINOPHIL # BLD AUTO: 0.2 K/UL (ref 0–0.5)
EOSINOPHIL NFR BLD: 2.4 % (ref 0–8)
ERYTHROCYTE [DISTWIDTH] IN BLOOD BY AUTOMATED COUNT: 22.1 % (ref 11.5–14.5)
EST. GFR  (NO RACE VARIABLE): >60 ML/MIN/1.73 M^2
GLUCOSE SERPL-MCNC: 89 MG/DL (ref 70–110)
HCO3 UR-SCNC: 34 MMOL/L (ref 24–28)
HCT VFR BLD AUTO: 39.9 % (ref 40–54)
HGB BLD-MCNC: 12 G/DL (ref 14–18)
IMM GRANULOCYTES # BLD AUTO: 0.02 K/UL (ref 0–0.04)
IMM GRANULOCYTES NFR BLD AUTO: 0.2 % (ref 0–0.5)
LYMPHOCYTES # BLD AUTO: 3.3 K/UL (ref 1–4.8)
LYMPHOCYTES NFR BLD: 36.9 % (ref 18–48)
MCH RBC QN AUTO: 22.8 PG (ref 27–31)
MCHC RBC AUTO-ENTMCNC: 30.1 G/DL (ref 32–36)
MCV RBC AUTO: 76 FL (ref 82–98)
MODE: ABNORMAL
MONOCYTES # BLD AUTO: 0.7 K/UL (ref 0.3–1)
MONOCYTES NFR BLD: 7.6 % (ref 4–15)
NEUTROPHILS # BLD AUTO: 4.7 K/UL (ref 1.8–7.7)
NEUTROPHILS NFR BLD: 52 % (ref 38–73)
NRBC BLD-RTO: 0 /100 WBC
OSMOLALITY SERPL: 302 MOSM/KG (ref 280–300)
OSMOLALITY UR: 115 MOSM/KG (ref 50–1200)
PCO2 BLDA: 73.4 MMHG (ref 35–45)
PH SMN: 7.27 [PH] (ref 7.35–7.45)
PLATELET # BLD AUTO: 289 K/UL (ref 150–450)
PMV BLD AUTO: 10 FL (ref 9.2–12.9)
PO2 BLDA: 29 MMHG (ref 40–60)
POC BE: 7 MMOL/L
POC SATURATED O2: 44 % (ref 95–100)
POC TCO2: 36 MMOL/L (ref 24–29)
POTASSIUM SERPL-SCNC: 4.1 MMOL/L (ref 3.5–5.1)
PROT SERPL-MCNC: 6.3 G/DL (ref 6–8.4)
RBC # BLD AUTO: 5.26 M/UL (ref 4.6–6.2)
SAMPLE: ABNORMAL
SARS-COV-2 RDRP RESP QL NAA+PROBE: NEGATIVE
SITE: ABNORMAL
SODIUM SERPL-SCNC: 131 MMOL/L (ref 136–145)
SODIUM UR-SCNC: 20 MMOL/L (ref 20–250)
TROPONIN I SERPL DL<=0.01 NG/ML-MCNC: 0.07 NG/ML (ref 0–0.03)
TROPONIN I SERPL DL<=0.01 NG/ML-MCNC: 0.08 NG/ML (ref 0–0.03)
WBC # BLD AUTO: 9.05 K/UL (ref 3.9–12.7)

## 2022-08-31 PROCEDURE — 99285 EMERGENCY DEPT VISIT HI MDM: CPT | Mod: 25,CS

## 2022-08-31 PROCEDURE — 94640 AIRWAY INHALATION TREATMENT: CPT | Mod: XB

## 2022-08-31 PROCEDURE — 94644 CONT INHLJ TX 1ST HOUR: CPT

## 2022-08-31 PROCEDURE — 83880 ASSAY OF NATRIURETIC PEPTIDE: CPT

## 2022-08-31 PROCEDURE — 25000242 PHARM REV CODE 250 ALT 637 W/ HCPCS

## 2022-08-31 PROCEDURE — 25000242 PHARM REV CODE 250 ALT 637 W/ HCPCS: Performed by: EMERGENCY MEDICINE

## 2022-08-31 PROCEDURE — G0378 HOSPITAL OBSERVATION PER HR: HCPCS

## 2022-08-31 PROCEDURE — 25000003 PHARM REV CODE 250

## 2022-08-31 PROCEDURE — 93005 ELECTROCARDIOGRAM TRACING: CPT

## 2022-08-31 PROCEDURE — 93010 ELECTROCARDIOGRAM REPORT: CPT | Mod: ,,, | Performed by: INTERNAL MEDICINE

## 2022-08-31 PROCEDURE — 80053 COMPREHEN METABOLIC PANEL: CPT | Performed by: EMERGENCY MEDICINE

## 2022-08-31 PROCEDURE — 93010 EKG 12-LEAD: ICD-10-PCS | Mod: ,,, | Performed by: INTERNAL MEDICINE

## 2022-08-31 PROCEDURE — 63600175 PHARM REV CODE 636 W HCPCS

## 2022-08-31 PROCEDURE — 96361 HYDRATE IV INFUSION ADD-ON: CPT

## 2022-08-31 PROCEDURE — 84300 ASSAY OF URINE SODIUM: CPT

## 2022-08-31 PROCEDURE — 96372 THER/PROPH/DIAG INJ SC/IM: CPT

## 2022-08-31 PROCEDURE — 94761 N-INVAS EAR/PLS OXIMETRY MLT: CPT

## 2022-08-31 PROCEDURE — 99285 PR EMERGENCY DEPT VISIT,LEVEL V: ICD-10-PCS | Mod: CS,,, | Performed by: EMERGENCY MEDICINE

## 2022-08-31 PROCEDURE — 94660 CPAP INITIATION&MGMT: CPT

## 2022-08-31 PROCEDURE — 27000190 HC CPAP FULL FACE MASK W/VALVE

## 2022-08-31 PROCEDURE — 96365 THER/PROPH/DIAG IV INF INIT: CPT

## 2022-08-31 PROCEDURE — 63700000 PHARM REV CODE 250 ALT 637 W/O HCPCS

## 2022-08-31 PROCEDURE — U0002 COVID-19 LAB TEST NON-CDC: HCPCS | Performed by: EMERGENCY MEDICINE

## 2022-08-31 PROCEDURE — 84484 ASSAY OF TROPONIN QUANT: CPT | Mod: 91 | Performed by: EMERGENCY MEDICINE

## 2022-08-31 PROCEDURE — 27000221 HC OXYGEN, UP TO 24 HOURS

## 2022-08-31 PROCEDURE — 99220 PR INITIAL OBSERVATION CARE,LEVL III: ICD-10-PCS | Mod: ,,, | Performed by: INTERNAL MEDICINE

## 2022-08-31 PROCEDURE — 83930 ASSAY OF BLOOD OSMOLALITY: CPT

## 2022-08-31 PROCEDURE — 99900035 HC TECH TIME PER 15 MIN (STAT)

## 2022-08-31 PROCEDURE — 94640 AIRWAY INHALATION TREATMENT: CPT

## 2022-08-31 PROCEDURE — 82803 BLOOD GASES ANY COMBINATION: CPT

## 2022-08-31 PROCEDURE — 25000003 PHARM REV CODE 250: Performed by: EMERGENCY MEDICINE

## 2022-08-31 PROCEDURE — 83935 ASSAY OF URINE OSMOLALITY: CPT

## 2022-08-31 PROCEDURE — 85025 COMPLETE CBC W/AUTO DIFF WBC: CPT | Performed by: EMERGENCY MEDICINE

## 2022-08-31 PROCEDURE — 99285 EMERGENCY DEPT VISIT HI MDM: CPT | Mod: CS,,, | Performed by: EMERGENCY MEDICINE

## 2022-08-31 PROCEDURE — 99220 PR INITIAL OBSERVATION CARE,LEVL III: CPT | Mod: ,,, | Performed by: INTERNAL MEDICINE

## 2022-08-31 RX ORDER — ASPIRIN 325 MG
325 TABLET, DELAYED RELEASE (ENTERIC COATED) ORAL
Status: COMPLETED | OUTPATIENT
Start: 2022-08-31 | End: 2022-08-31

## 2022-08-31 RX ORDER — AZITHROMYCIN 250 MG/1
500 TABLET, FILM COATED ORAL DAILY
Status: COMPLETED | OUTPATIENT
Start: 2022-08-31 | End: 2022-09-02

## 2022-08-31 RX ORDER — IBUPROFEN 200 MG
800 TABLET ORAL DAILY PRN
COMMUNITY
End: 2022-10-19

## 2022-08-31 RX ORDER — FLUTICASONE PROPIONATE 50 MCG
2 SPRAY, SUSPENSION (ML) NASAL DAILY
Status: DISCONTINUED | OUTPATIENT
Start: 2022-09-01 | End: 2022-09-02 | Stop reason: HOSPADM

## 2022-08-31 RX ORDER — ENOXAPARIN SODIUM 100 MG/ML
40 INJECTION SUBCUTANEOUS EVERY 24 HOURS
Status: DISCONTINUED | OUTPATIENT
Start: 2022-08-31 | End: 2022-09-02 | Stop reason: HOSPADM

## 2022-08-31 RX ORDER — IBUPROFEN 200 MG
24 TABLET ORAL
Status: DISCONTINUED | OUTPATIENT
Start: 2022-08-31 | End: 2022-09-02 | Stop reason: HOSPADM

## 2022-08-31 RX ORDER — PREDNISONE 20 MG/1
40 TABLET ORAL DAILY
Status: DISCONTINUED | OUTPATIENT
Start: 2022-08-31 | End: 2022-09-02 | Stop reason: HOSPADM

## 2022-08-31 RX ORDER — ALBUTEROL SULFATE 2.5 MG/.5ML
5 SOLUTION RESPIRATORY (INHALATION)
Status: COMPLETED | OUTPATIENT
Start: 2022-08-31 | End: 2022-08-31

## 2022-08-31 RX ORDER — ROFLUMILAST 500 UG/1
500 TABLET ORAL DAILY
Status: ON HOLD | COMMUNITY
End: 2022-09-28 | Stop reason: SDUPTHER

## 2022-08-31 RX ORDER — IBUPROFEN 200 MG
16 TABLET ORAL
Status: DISCONTINUED | OUTPATIENT
Start: 2022-08-31 | End: 2022-09-02 | Stop reason: HOSPADM

## 2022-08-31 RX ORDER — CARVEDILOL 12.5 MG/1
12.5 TABLET ORAL 2 TIMES DAILY WITH MEALS
Status: DISCONTINUED | OUTPATIENT
Start: 2022-08-31 | End: 2022-09-02

## 2022-08-31 RX ORDER — ACETAMINOPHEN 325 MG/1
650 TABLET ORAL EVERY 8 HOURS PRN
Status: DISCONTINUED | OUTPATIENT
Start: 2022-08-31 | End: 2022-09-02 | Stop reason: HOSPADM

## 2022-08-31 RX ORDER — IPRATROPIUM BROMIDE AND ALBUTEROL SULFATE 2.5; .5 MG/3ML; MG/3ML
3 SOLUTION RESPIRATORY (INHALATION) EVERY 6 HOURS
Status: DISCONTINUED | OUTPATIENT
Start: 2022-08-31 | End: 2022-09-02 | Stop reason: HOSPADM

## 2022-08-31 RX ORDER — TALC
6 POWDER (GRAM) TOPICAL NIGHTLY PRN
Status: DISCONTINUED | OUTPATIENT
Start: 2022-08-31 | End: 2022-09-02 | Stop reason: HOSPADM

## 2022-08-31 RX ORDER — NALOXONE HCL 0.4 MG/ML
0.02 VIAL (ML) INJECTION
Status: DISCONTINUED | OUTPATIENT
Start: 2022-08-31 | End: 2022-09-02 | Stop reason: HOSPADM

## 2022-08-31 RX ORDER — FUROSEMIDE 40 MG/1
40 TABLET ORAL DAILY
Status: DISCONTINUED | OUTPATIENT
Start: 2022-08-31 | End: 2022-09-02 | Stop reason: HOSPADM

## 2022-08-31 RX ORDER — SODIUM CHLORIDE 0.9 % (FLUSH) 0.9 %
10 SYRINGE (ML) INJECTION EVERY 12 HOURS PRN
Status: DISCONTINUED | OUTPATIENT
Start: 2022-08-31 | End: 2022-09-02 | Stop reason: HOSPADM

## 2022-08-31 RX ORDER — GLUCAGON 1 MG
1 KIT INJECTION
Status: DISCONTINUED | OUTPATIENT
Start: 2022-08-31 | End: 2022-09-02 | Stop reason: HOSPADM

## 2022-08-31 RX ORDER — ALBUTEROL SULFATE 2.5 MG/.5ML
10 SOLUTION RESPIRATORY (INHALATION)
Status: COMPLETED | OUTPATIENT
Start: 2022-08-31 | End: 2022-08-31

## 2022-08-31 RX ADMIN — IPRATROPIUM BROMIDE AND ALBUTEROL SULFATE 3 ML: 2.5; .5 SOLUTION RESPIRATORY (INHALATION) at 02:08

## 2022-08-31 RX ADMIN — ASPIRIN 325 MG: 325 TABLET, COATED ORAL at 01:08

## 2022-08-31 RX ADMIN — CARVEDILOL 12.5 MG: 12.5 TABLET, FILM COATED ORAL at 08:08

## 2022-08-31 RX ADMIN — ALBUTEROL SULFATE 10 MG: 2.5 SOLUTION RESPIRATORY (INHALATION) at 12:08

## 2022-08-31 RX ADMIN — CARVEDILOL 12.5 MG: 12.5 TABLET, FILM COATED ORAL at 06:08

## 2022-08-31 RX ADMIN — PREDNISONE 40 MG: 20 TABLET ORAL at 08:08

## 2022-08-31 RX ADMIN — FUROSEMIDE 40 MG: 40 TABLET ORAL at 08:08

## 2022-08-31 RX ADMIN — ENOXAPARIN SODIUM 40 MG: 100 INJECTION SUBCUTANEOUS at 09:08

## 2022-08-31 RX ADMIN — CEFTRIAXONE SODIUM 2 G: 2 INJECTION, SOLUTION INTRAVENOUS at 06:08

## 2022-08-31 RX ADMIN — AZITHROMYCIN MONOHYDRATE 500 MG: 250 TABLET ORAL at 08:08

## 2022-08-31 RX ADMIN — IPRATROPIUM BROMIDE AND ALBUTEROL SULFATE 3 ML: 2.5; .5 SOLUTION RESPIRATORY (INHALATION) at 07:08

## 2022-08-31 RX ADMIN — ALBUTEROL SULFATE 5 MG: 2.5 SOLUTION RESPIRATORY (INHALATION) at 04:08

## 2022-08-31 RX ADMIN — SODIUM CHLORIDE 500 ML: 0.9 INJECTION, SOLUTION INTRAVENOUS at 01:08

## 2022-08-31 RX ADMIN — IPRATROPIUM BROMIDE AND ALBUTEROL SULFATE 3 ML: 2.5; .5 SOLUTION RESPIRATORY (INHALATION) at 09:08

## 2022-08-31 SDOH — SOCIAL DETERMINANTS OF HEALTH (SDOH): PROBLEM RELATED TO SOCIAL ENVIRONMENT, UNSPECIFIED: Z60.9

## 2022-08-31 NOTE — H&P
[facility] - Emergency Dept  [facility]  History & Physical    Patient Name: Baltazar Mccray  MRN: [id number]  Patient Class: OP- Observation  Admission Date: 8/31/2022  Attending Physician: Jose Juan Castle MD   Primary Care Provider: [facility]         Patient information was obtained from patient and ER records.     Subjective:     Principal Problem:Sob likely d/t COPD exacerbation    Chief Complaint:   Chief Complaint   Patient presents with    Shortness of Breath     Pt arrives via EMS c/o SOB. Pt's O2 sats 78% on 2L upon arrival to pt's house. DuoNeb administered en route. O2 sats 100%.        HPI: Mr. Mccray is a 61 yo male with PMHx of Afib, HTN, and COPD on home 2L NC and nightly BiPAP. Presents to the ER for worsening SOB for 1 week. Of note, patient had a recent hospitalization from 8/10-8/12 for COPD exacerbation and was treated with PO steroids, scheduled duonebs, and PO azithromycin. Patient complains of shortness of breath at rest, while getting dressed.  He takes his Breztri and albuterol inhaler as directed which usually helps with SOB. However lately it has not been relieving, prompting ED visit.  He notices becoming SOB 5-6 hrs of BPAP use,  prompting him to take it off. Additional sxs include constant dry cough, dyspnea on exertion, at rest, and minimal activity. Patient denies chest pain, drainage from nose, lower extremity edema, hemoptysis, sputum production, or chest tightness. Patient has not had recent travel. He does live with pet cat. He has noticed some weight loss, attributing it to decreased appetite. He states he is not eating because it worsens his SOB. Symptoms are alleviated by oxygen and medication(s) (albuterol).     ED Course:  At home, his SpO2 was 72% on 2 L. EMS gave Solu-Medrol duo nebs. Upon reaching the ED, his respiratory diistress improved but had some wheezing and was saturating 89% on home 2LNC. Initial labs notable for troponin slightly elevated  0.079 with repeat downtrending to 0.073. His Na was decreased 131 and HCO3 increased at 30. EKG showed no ST elevation or concerning EKG changes. CXR showed no consolidation or pleural effusion or pneumothorax. Received albuterol nebulizer 10 mg at 12:18am and 5 mg nebulizer at 4am. On my evaluation, pt was no longer showing signs of respiratory distress and overall stable and saturating 94% on 3L NC. He is being admitted to observation.       FHx:  Mother passed from train accident   Father had colon bowel resection and passed at age 67.    SocHx:  Tobacco use: 1.5 pack/day for almost 30 years. D/t worsening SOB, only able to smoke 1 cigarrete  EtOH use: 1- 16 oz beer can/dy  Illicit drug use: denies  Functional status: independent on all ADL   Living situation: lives alone at home           Past Medical History:   Diagnosis Date    Asthma     Atrial fibrillation with rapid ventricular response     CHF (congestive heart failure)     COPD (chronic obstructive pulmonary disease)     home O2 at night only    Coronary artery disease     Hypertension     Lung nodule     Pneumonia     Seizures        Past Surgical History:   Procedure Laterality Date    ESOPHAGOGASTRODUODENOSCOPY N/A 2/11/2022    Procedure: EGD (ESOPHAGOGASTRODUODENOSCOPY);  Surgeon: Cain Ortega MD;  Location: [facility] ENDO ([address] [place] [address]);  Service: Endoscopy;  Laterality: N/A;    LEFT HEART CATHETERIZATION  4/23/2020    Procedure: Left heart cath;  Surgeon: Nic Pollack MD;  Location: [facility] CATH LAB;  Service: Cardiology;;       Review of patient's allergies indicates:   Allergen Reactions    Benazepril Swelling       No current facility-administered medications on file prior to encounter.     Current Outpatient Medications on File Prior to Encounter   Medication Sig    acetaminophen (TYLENOL) 325 MG tablet Take 2 tablets (650 mg total) by mouth every 8 (eight) hours as needed for Pain or Temperature greater than (100.5).    albuterol (PROVENTIL/VENTOLIN  HFA) 90 mcg/actuation inhaler Inhale 1-2 puffs into the lungs every 6 (six) hours as needed for Wheezing. Rescue    albuterol-ipratropium (DUO-NEB) 2.5 mg-0.5 mg/3 mL nebulizer solution Take 3 mLs by nebulization every 4 (four) hours as needed for Wheezing or Shortness of Breath. Rescue    azithromycin (ZITHROMAX) 500 MG tablet Take 1 tablet (500 mg total) by mouth every Mon, Wed, Fri.    budesonide-glycopyr-formoterol (BREZTRI AEROSPHERE) 160-9-4.8 mcg/actuation HFAA Inhale 2 puffs into the lungs 2 (two) times daily. Rinse mouth after use. CONTROL INHALER    carvediloL (COREG) 12.5 MG tablet Take 1 tablet (12.5 mg total) by mouth 2 (two) times daily with meals.    ferrous sulfate 325 (65 FE) MG EC tablet Take 1 tablet (325 mg total) by mouth once daily.    furosemide (LASIX) 20 MG tablet Take 2 tablets (40 mg total) by mouth once daily.    nicotine (NICODERM CQ) 7 mg/24 hr Place 1 patch onto the skin once daily.    nicotine (NICODERM CQ) 7 mg/24 hr Place 1 patch onto the skin once daily.    pantoprazole (PROTONIX) 40 MG tablet Take 1 tablet (40 mg total) by mouth 2 (two) times daily.    [DISCONTINUED] tiotropium bromide (SPIRIVA RESPIMAT) 2.5 mcg/actuation inhaler Inhale 2 puffs into the lungs Daily. Controller     Family History       Problem Relation (Age of Onset)    Cancer Father    Hypertension Sister    Stroke Sister, Brother          Tobacco Use    Smoking status: Some Days     Packs/day: 0.25     Types: Cigarettes    Smokeless tobacco: Never    Tobacco comments:     1-2 per day   Substance and Sexual Activity    Alcohol use: Yes     Alcohol/week: 2.0 standard drinks     Types: 2 Cans of beer per week     Comment: every night    Drug use: No    Sexual activity: Not Currently     Review of Systems   Constitutional:  Negative for chills, fatigue and fever.   HENT:  Negative for congestion, sore throat and trouble swallowing.    Eyes:  Negative for visual disturbance.   Respiratory:  Positive for apnea, cough  (dry cough) and shortness of breath. Negative for chest tightness and wheezing.    Cardiovascular:  Negative for chest pain, palpitations and leg swelling.   Gastrointestinal:  Negative for abdominal pain, constipation, diarrhea, nausea and vomiting.   Genitourinary:  Negative for dysuria.   Musculoskeletal:  Negative for arthralgias.   Neurological:  Positive for dizziness, light-headedness and headaches. Negative for weakness and numbness.   Objective:     Vital Signs (Most Recent):  Temp: 98 °F (36.7 °C) (08/31/22 0300)  Pulse: 78 (08/31/22 0404)  Resp: 19 (08/31/22 0404)  BP: (!) 173/78 (08/31/22 0400)  SpO2: 97 % (08/31/22 0404)   Vital Signs (24h Range):  Temp:  [97.7 °F (36.5 °C)-98 °F (36.7 °C)] 98 °F (36.7 °C)  Pulse:  [78-86] 78  Resp:  [18-26] 19  SpO2:  [89 %-100 %] 97 %  BP: (104-173)/(62-78) 173/78        There is no height or weight on file to calculate BMI.    Physical Exam  Constitutional:       General: He is not in acute distress.     Appearance: Normal appearance.   HENT:      Head: Normocephalic and atraumatic.      Mouth/Throat:      Mouth: Mucous membranes are dry.   Eyes:      Extraocular Movements: Extraocular movements intact.      Conjunctiva/sclera: Conjunctivae normal.      Pupils: Pupils are equal, round, and reactive to light.   Cardiovascular:      Rate and Rhythm: Normal rate and regular rhythm.      Pulses: Normal pulses.      Heart sounds: Normal heart sounds.   Pulmonary:      Effort: Pulmonary effort is normal. No respiratory distress.      Breath sounds: Normal breath sounds. No wheezing.   Abdominal:      General: Abdomen is flat. Bowel sounds are normal. There is no distension.      Tenderness: There is no abdominal tenderness.   Musculoskeletal:         General: No swelling.   Skin:     General: Skin is warm and dry.      Capillary Refill: Capillary refill takes less than 2 seconds.   Neurological:      General: No focal deficit present.      Mental Status: He is alert and  oriented to person, place, and time.         CRANIAL NERVES     CN III, IV, VI   Pupils are equal, round, and reactive to light.     Significant Labs: All pertinent labs within the past 24 hours have been reviewed.  Recent Lab Results         08/31/22  0333   08/31/22  0040   08/31/22  0031        Albumin     2.9       Alkaline Phosphatase     81       ALT     7       Anion Gap     4       AST     12       Baso #     0.08       Basophil %     0.9       BILIRUBIN TOTAL     0.5  Comment: For infants and newborns, interpretation of results should be based  on gestational age, weight and in agreement with clinical  observations.    Premature Infant recommended reference ranges:  Up to 24 hours.............<8.0 mg/dL  Up to 48 hours............<12.0 mg/dL  3-5 days..................<15.0 mg/dL  6-29 days.................<15.0 mg/dL         BUN     7       Calcium     8.8       Chloride     97       CO2     30       Creatinine     0.8       Differential Method     Automated       eGFR     >60.0       Eos #     0.2       Eosinophil %     2.4       Glucose     89       Gran # (ANC)     4.7       Gran %     52.0       Hematocrit     39.9       Hemoglobin     12.0       Immature Grans (Abs)     0.02  Comment: Mild elevation in immature granulocytes is non specific and   can be seen in a variety of conditions including stress response,   acute inflammation, trauma and pregnancy. Correlation with other   laboratory and clinical findings is essential.         Immature Granulocytes     0.2       Lymph #     3.3       Lymph %     36.9       MCH     22.8       MCHC     30.1       MCV     76       Mono #     0.7       Mono %     7.6       MPV     10.0       nRBC     0       Platelets     289       Potassium     4.1       PROTEIN TOTAL     6.3       RBC     5.26       RDW     22.1       SARS-CoV-2 RNA, Amplification, Qual   Negative  Comment: This test utilizes isothermal nucleic acid amplification   technology to detect the  SARS-CoV-2 RdRp nucleic acid segment.   The analytical sensitivity (limit of detection) is 125 genome   equivalents/mL.     A POSITIVE result implies infection with the SARS-CoV-2 virus;  the patient is presumed to be contagious.    A NEGATIVE result means that SARS-CoV-2 nucleic acids are not  present above the limit of detection. A NEGATIVE result should be   treated as presumptive. It does not rule out the possibility of   COVID-19 and should not be the sole basis for treatment decisions.   If COVID-19 is strongly suspected based on clinical and exposure   history, re-testing using an alternate molecular assay should be   considered.       This test is only for use under the Food and Drug   Administration s Emergency Use Authorization (EUA).   Commercial kits are provided by [employer].   Performance characteristics of the EUA have been independently  verified by [facility] Department of  Pathology and Laboratory Medicine.   _________________________________________________________________  The ID NOW COVID-19 Letter of Authorization, along with the   authorized Fact Sheet for Healthcare Providers, the authorized Fact  Sheet for Patients, and authorized labeling are available on the FDA   website:  www.fda.gov/MedicalDevices/Safety/EmergencySituations/anj737786.htm           Sodium     131       Troponin I 0.073  Comment: The reference interval for Troponin I represents the 99th percentile   cutoff   for our facility and is consistent with 3rd generation assay   performance.       0.079  Comment: The reference interval for Troponin I represents the 99th percentile   cutoff   for our facility and is consistent with 3rd generation assay   performance.         WBC     9.05               Significant Imaging: I have reviewed all pertinent imaging results/findings within the past 24 hours.  X-Ray Chest AP Portable  Narrative: EXAMINATION:  XR CHEST AP PORTABLE    CLINICAL HISTORY:  Chronic  obstructive pulmonary disease, unspecified    TECHNIQUE:  Single frontal view of the chest was performed.    COMPARISON:  Chest radiograph August 10, 2022    FINDINGS:  The cardiomediastinal silhouette appears stable, mild aortic atherosclerotic change noted.  Pulmonary bronchovascular markings appears stable.  Chronic change noted, there is no evidence for superimposed confluent infiltrate or consolidation, significant pleural effusion or pneumothorax.  The osseous structures appear intact with chronic change noted.  Impression: Stable radiographic appearance without radiographic evidence for superimposed acute intrathoracic process.    Electronically signed by: Haja He  Date:    08/31/2022  Time:    00:32      Assessment/Plan:     Hyponatremia  Likely secondary to poor PO intake vs primary polydipsia vs underlying malignancy vs SIADH     Plan:  - IVF bolus received in ED  - Fluid restriction not recommended at this time  - F/u serum osm, urine osm, urine Na         BiPAP (biphasic positive airway pressure) dependence  - BiPAP QHS      Chronic diastolic congestive heart failure  Chronic Diastolic HF  - no signs of VO on exam  - Most recent TTE on 8/11/2022 demonstrates: LVef 55%,   Results for orders placed during the hospital encounter of 08/10/22    Echo    Interpretation Summary  · The left ventricle is normal in size with concentric remodeling and normal systolic function.  · The estimated ejection fraction is 55%.  · Indeterminate left ventricular diastolic function.  · There is abnormal septal wall motion.  · Normal right ventricular size with normal right ventricular systolic function.  · Intermediate central venous pressure (8 mmHg).  · Severe left atrial enlargement.  · An interatrial septal aneurysm is present.    - EKG : no ST elevations or concerning EKG changes  - Troponin downtrending   - F/u on BNP   -  CXR showing no signs of volume overload  - Fluid restriction at <1.5L with strict I/Os and  daily weights   - Continue home diuretic dose: 40mg Lasix and monitor response.  Goal diuresis 2-3L/day.     - Continue home coreg  - Up to date risk stratification : TSH, Lipids, HbA1c with optimization of risk factors is necessary  - Check Electrolytes, keep Mag >2 & K+ >4  - SCDs, TEDs, Nursing communication to elevated LE   - Ambulate as tolerated      Elevated troponin  - troponin continue to downtrend   - f/u on repeat EKG    Essential hypertension  - continue home coreg      COPD exacerbation  Most recent PFTs showed severe obstruction with severely depressed DLCO.   Current home regimen: Breztri 2 puffs BID and duo-neb as rescue inhaler, rofumilast, and azithromycin 500 mg MWF  Followed by Dr. Pascal  Smoking Hx: Current smoker 1-2 cigarette  CXR showed no evidence of consolidation or signifant pleural effusion or PNA.   SpO2 97% on 3L NC      Plan:  - ABx Ceftriaxone 1g X 3 days and azithromycin 500 mg X 3 days   - Duo-nebs q6hr PRN  - Prednisone 40mg daily x 5 days  - Wean supplemental O2 with goal SpO2 88-92%  - Emphasized importance of smoking cessation  - f/u on ABG          VTE Risk Mitigation (From admission, onward)           Ordered     enoxaparin injection 40 mg  Daily         08/31/22 0548     IP VTE HIGH RISK PATIENT  Once         08/31/22 0548     Place sequential compression device  Until discontinued         08/31/22 0548                       Azalia Warner MD  Department of Hospital Medicine   [facility] - Emergency Dept

## 2022-08-31 NOTE — SUBJECTIVE & OBJECTIVE
Past Medical History:   Diagnosis Date    Asthma     Atrial fibrillation with rapid ventricular response     CHF (congestive heart failure)     COPD (chronic obstructive pulmonary disease)     home O2 at night only    Coronary artery disease     Hypertension     Lung nodule     Pneumonia     Seizures        Past Surgical History:   Procedure Laterality Date    ESOPHAGOGASTRODUODENOSCOPY N/A 2/11/2022    Procedure: EGD (ESOPHAGOGASTRODUODENOSCOPY);  Surgeon: Cain Ortega MD;  Location: [facility] ENDO [address]);  Service: Endoscopy;  Laterality: N/A;    LEFT HEART CATHETERIZATION  4/23/2020    Procedure: Left heart cath;  Surgeon: Nic Pollack MD;  Location: [facility] CATH LAB;  Service: Cardiology;;       Review of patient's allergies indicates:   Allergen Reactions    Benazepril Swelling       No current facility-administered medications on file prior to encounter.     Current Outpatient Medications on File Prior to Encounter   Medication Sig    acetaminophen (TYLENOL) 325 MG tablet Take 2 tablets (650 mg total) by mouth every 8 (eight) hours as needed for Pain or Temperature greater than (100.5).    albuterol (PROVENTIL/VENTOLIN HFA) 90 mcg/actuation inhaler Inhale 1-2 puffs into the lungs every 6 (six) hours as needed for Wheezing. Rescue    albuterol-ipratropium (DUO-NEB) 2.5 mg-0.5 mg/3 mL nebulizer solution Take 3 mLs by nebulization every 4 (four) hours as needed for Wheezing or Shortness of Breath. Rescue    azithromycin (ZITHROMAX) 500 MG tablet Take 1 tablet (500 mg total) by mouth every Mon, Wed, Fri.    budesonide-glycopyr-formoterol (BREZTRI AEROSPHERE) 160-9-4.8 mcg/actuation HFAA Inhale 2 puffs into the lungs 2 (two) times daily. Rinse mouth after use. CONTROL INHALER    carvediloL (COREG) 12.5 MG tablet Take 1 tablet (12.5 mg total) by mouth 2 (two) times daily with meals.    ferrous sulfate 325 (65 FE) MG EC tablet Take 1 tablet (325 mg total) by mouth once daily.    furosemide (LASIX) 20 MG tablet  Take 2 tablets (40 mg total) by mouth once daily.    nicotine (NICODERM CQ) 7 mg/24 hr Place 1 patch onto the skin once daily.    nicotine (NICODERM CQ) 7 mg/24 hr Place 1 patch onto the skin once daily.    pantoprazole (PROTONIX) 40 MG tablet Take 1 tablet (40 mg total) by mouth 2 (two) times daily.    [DISCONTINUED] tiotropium bromide (SPIRIVA RESPIMAT) 2.5 mcg/actuation inhaler Inhale 2 puffs into the lungs Daily. Controller     Family History       Problem Relation (Age of Onset)    Cancer Father    Hypertension Sister    Stroke Sister, Brother          Tobacco Use    Smoking status: Some Days     Packs/day: 0.25     Types: Cigarettes    Smokeless tobacco: Never    Tobacco comments:     1-2 per day   Substance and Sexual Activity    Alcohol use: Yes     Alcohol/week: 2.0 standard drinks     Types: 2 Cans of beer per week     Comment: every night    Drug use: No    Sexual activity: Not Currently     Review of Systems   Constitutional:  Negative for chills, fatigue and fever.   HENT:  Negative for congestion, sore throat and trouble swallowing.    Eyes:  Negative for visual disturbance.   Respiratory:  Positive for apnea, cough (dry cough) and shortness of breath. Negative for chest tightness and wheezing.    Cardiovascular:  Negative for chest pain, palpitations and leg swelling.   Gastrointestinal:  Negative for abdominal pain, constipation, diarrhea, nausea and vomiting.   Genitourinary:  Negative for dysuria.   Musculoskeletal:  Negative for arthralgias.   Neurological:  Positive for dizziness, light-headedness and headaches. Negative for weakness and numbness.   Objective:     Vital Signs (Most Recent):  Temp: 98 °F (36.7 °C) (08/31/22 0300)  Pulse: 78 (08/31/22 0404)  Resp: 19 (08/31/22 0404)  BP: (!) 173/78 (08/31/22 0400)  SpO2: 97 % (08/31/22 0404)   Vital Signs (24h Range):  Temp:  [97.7 °F (36.5 °C)-98 °F (36.7 °C)] 98 °F (36.7 °C)  Pulse:  [78-86] 78  Resp:  [18-26] 19  SpO2:  [89 %-100 %] 97 %  BP:  (104-173)/(62-78) 173/78        There is no height or weight on file to calculate BMI.    Physical Exam  Constitutional:       General: He is not in acute distress.     Appearance: Normal appearance.   HENT:      Head: Normocephalic and atraumatic.      Mouth/Throat:      Mouth: Mucous membranes are dry.   Eyes:      Extraocular Movements: Extraocular movements intact.      Conjunctiva/sclera: Conjunctivae normal.      Pupils: Pupils are equal, round, and reactive to light.   Cardiovascular:      Rate and Rhythm: Normal rate and regular rhythm.      Pulses: Normal pulses.      Heart sounds: Normal heart sounds.   Pulmonary:      Effort: Pulmonary effort is normal. No respiratory distress.      Breath sounds: Normal breath sounds. No wheezing.   Abdominal:      General: Abdomen is flat. Bowel sounds are normal. There is no distension.      Tenderness: There is no abdominal tenderness.   Musculoskeletal:         General: No swelling.   Skin:     General: Skin is warm and dry.      Capillary Refill: Capillary refill takes less than 2 seconds.   Neurological:      General: No focal deficit present.      Mental Status: He is alert and oriented to person, place, and time.         CRANIAL NERVES     CN III, IV, VI   Pupils are equal, round, and reactive to light.     Significant Labs: All pertinent labs within the past 24 hours have been reviewed.  Recent Lab Results         08/31/22  0333   08/31/22  0040   08/31/22  0031        Albumin     2.9       Alkaline Phosphatase     81       ALT     7       Anion Gap     4       AST     12       Baso #     0.08       Basophil %     0.9       BILIRUBIN TOTAL     0.5  Comment: For infants and newborns, interpretation of results should be based  on gestational age, weight and in agreement with clinical  observations.    Premature Infant recommended reference ranges:  Up to 24 hours.............<8.0 mg/dL  Up to 48 hours............<12.0 mg/dL  3-5 days..................<15.0  mg/dL  6-29 days.................<15.0 mg/dL         BUN     7       Calcium     8.8       Chloride     97       CO2     30       Creatinine     0.8       Differential Method     Automated       eGFR     >60.0       Eos #     0.2       Eosinophil %     2.4       Glucose     89       Gran # (ANC)     4.7       Gran %     52.0       Hematocrit     39.9       Hemoglobin     12.0       Immature Grans (Abs)     0.02  Comment: Mild elevation in immature granulocytes is non specific and   can be seen in a variety of conditions including stress response,   acute inflammation, trauma and pregnancy. Correlation with other   laboratory and clinical findings is essential.         Immature Granulocytes     0.2       Lymph #     3.3       Lymph %     36.9       MCH     22.8       MCHC     30.1       MCV     76       Mono #     0.7       Mono %     7.6       MPV     10.0       nRBC     0       Platelets     289       Potassium     4.1       PROTEIN TOTAL     6.3       RBC     5.26       RDW     22.1       SARS-CoV-2 RNA, Amplification, Qual   Negative  Comment: This test utilizes isothermal nucleic acid amplification   technology to detect the SARS-CoV-2 RdRp nucleic acid segment.   The analytical sensitivity (limit of detection) is 125 genome   equivalents/mL.     A POSITIVE result implies infection with the SARS-CoV-2 virus;  the patient is presumed to be contagious.    A NEGATIVE result means that SARS-CoV-2 nucleic acids are not  present above the limit of detection. A NEGATIVE result should be   treated as presumptive. It does not rule out the possibility of   COVID-19 and should not be the sole basis for treatment decisions.   If COVID-19 is strongly suspected based on clinical and exposure   history, re-testing using an alternate molecular assay should be   considered.       This test is only for use under the Food and Drug   Administration s Emergency Use Authorization (EUA).   Commercial kits are provided by [employer].   Performance characteristics of the EUA have been independently  verified by [facility] Department of  Pathology and Laboratory Medicine.   _________________________________________________________________  The ID NOW COVID-19 Letter of Authorization, along with the   authorized Fact Sheet for Healthcare Providers, the authorized Fact  Sheet for Patients, and authorized labeling are available on the FDA   website:  www.fda.gov/MedicalDevices/Safety/EmergencySituations/zvj382445.htm           Sodium     131       Troponin I 0.073  Comment: The reference interval for Troponin I represents the 99th percentile   cutoff   for our facility and is consistent with 3rd generation assay   performance.       0.079  Comment: The reference interval for Troponin I represents the 99th percentile   cutoff   for our facility and is consistent with 3rd generation assay   performance.         WBC     9.05               Significant Imaging: I have reviewed all pertinent imaging results/findings within the past 24 hours.  X-Ray Chest AP Portable  Narrative: EXAMINATION:  XR CHEST AP PORTABLE    CLINICAL HISTORY:  Chronic obstructive pulmonary disease, unspecified    TECHNIQUE:  Single frontal view of the chest was performed.    COMPARISON:  Chest radiograph August 10, 2022    FINDINGS:  The cardiomediastinal silhouette appears stable, mild aortic atherosclerotic change noted.  Pulmonary bronchovascular markings appears stable.  Chronic change noted, there is no evidence for superimposed confluent infiltrate or consolidation, significant pleural effusion or pneumothorax.  The osseous structures appear intact with chronic change noted.  Impression: Stable radiographic appearance without radiographic evidence for superimposed acute intrathoracic process.    Electronically signed by: Haja He  Date:    08/31/2022  Time:    00:32

## 2022-08-31 NOTE — PLAN OF CARE
"Pt is a member of the ED U-Turn program for high utilizers and is followed by SW during any ED visit or admission.     SW met with pt at bedside in EDOU. Pt reports that he has been compliant with all of his home O2 needs and has been keeping up with his supply. His concentrator which was malfunctioning prior to a previous admission has been replaced and pt denies any other concerns about his equipment. Pt states that "I'm doing everything I need to stay out of this hospital, my breathing just keeps getting worse". Pt reports that he has a PCP at [facility] but does not have a follow up scheduled currently.     Initial assessment completed below. SW will continue to follow during this admission to assist with any discharge or follow up needs.     Lizette Flores, KIET  ED   Care Management  [facility]  Ext. [phone]    [facility] - Emergency Dept  Initial Discharge Assessment       Primary Care Provider: [facility]    Admission Diagnosis: COPD exacerbation [J44.1]    Admission Date: 8/31/2022  Expected Discharge Date:     Discharge Barriers Identified: None    Payor: HUMANA MANAGED MEDICARE / Plan: HU[employer]NA MEDICARE [facility] / Product Type: Medicare Advantage /     Extended Emergency Contact Information  Primary Emergency Contact: Gladis Mccray   [place]  Mobile Phone: [phone]  Relation: Sister  Secondary Emergency Contact: Viry Mccray   [place]  Mobile Phone: [phone]  Relation: Sister    Discharge Plan A: Home  Discharge Plan B: Home, Home Health      [facility]  [address]  [place] [address]  Phone: [phone] Fax: [phone]    [id number] - [place] - [address] AT [address]  [address] [place] [address]  Phone: [phone] Fax: [phone]      Initial Assessment (most recent)       Adult Discharge Assessment - 08/31/22 " 0926          Discharge Assessment    Assessment Type Discharge Planning Assessment     Confirmed/corrected address, phone number and insurance Yes     Confirmed Demographics Correct on Facesheet     Source of Information family;health record     Does patient/caregiver understand observation status Yes     Communicated RUTH with patient/caregiver Yes     Reason For Admission SOB, COPD exacerbation     Lives With alone     Do you expect to return to your current living situation? Yes     Do you have help at home or someone to help you manage your care at home? No   No caregivers, pt lives alone, brother and sister help sometimes and check on him.    Prior to hospitilization cognitive status: No Deficits;Alert/Oriented     Current cognitive status: Alert/Oriented;No Deficits     Walking or Climbing Stairs Difficulty other (see comments)   No equipment/assist requried but pt reports decrease in endurance and strength walking d/t SOB    Dressing/Bathing Difficulty none     Home Accessibility wheelchair accessible     Home Layout Able to live on 1st floor     Equipment Currently Used at Home oxygen;nebulizer;respiratory supplies     Readmission within 30 days? No     Patient currently being followed by outpatient case management? No     Do you currently have service(s) that help you manage your care at home? No     Do you take prescription medications? Yes     Do you have prescription coverage? Yes     Coverage [employer]     Do you have any problems affording any of your prescribed medications? No     Is the patient taking medications as prescribed? yes     Who is going to help you get home at discharge? Brother or sister     How do you get to doctors appointments? family or friend will provide;health plan transportation     Are you on dialysis? No     Do you take coumadin? No     Discharge Plan A Home     Discharge Plan B Home;Home Health     DME Needed Upon Discharge  none     Discharge Plan discussed with:  Patient     Discharge Barriers Identified None

## 2022-08-31 NOTE — ED TRIAGE NOTES
Baltazar Mccray, an 62 y.o. male presents to the ED c/o SOB x few hrs at home. Hx of COPD and wears 2 L NC at home. Per EMS, pt SpO2 74% on2L. Given duoneb and 125 solumedrol en route. SpO2 99% on 3L       Chief Complaint   Patient presents with    Shortness of Breath     Pt arrives via EMS c/o SOB. Pt's O2 sats 78% on 2L upon arrival to pt's house. DuoNeb administered en route. O2 sats 100%.     Review of patient's allergies indicates:   Allergen Reactions    Benazepril Swelling     Past Medical History:   Diagnosis Date    Asthma     Atrial fibrillation with rapid ventricular response     CHF (congestive heart failure)     COPD (chronic obstructive pulmonary disease)     home O2 at night only    Coronary artery disease     Hypertension     Lung nodule     Pneumonia     Seizures        LOC: The patient is awake, alert and aware of environment with an appropriate affect, the patient is oriented x 3 and speaking appropriately.   APPEARANCE: Patient appears comfortable and in no acute distress, patient is clean and well groomed.  SKIN: The skin is warm and dry, color consistent with ethnicity.   MUSCULOSKELETAL: Patient moving all extremities spontaneously, no swelling noted.  RESPIRATORY: Airway is open and patent, respirations are spontaneous, patient has a rapid rate, no accessory muscle use noted. C/o SOB all day, wears 2L NC at home , hx of COPD   CARDIAC: Patient has a normal rate and regular rhythm, no edema noted, capillary refill < 3 seconds.   GASTRO: Soft and non tender to palpation, no distention noted.   : Pt denies any pain or frequency with urination.  NEURO: Pt opens eyes spontaneously, behavior appropriate to situation, follows commands.

## 2022-08-31 NOTE — ASSESSMENT & PLAN NOTE
Likely secondary to poor PO intake vs primary polydipsia vs underlying malignancy vs SIADH     Plan:  - IVF bolus received in ED  - Fluid restriction not recommended at this time  - F/u serum osm, urine osm, urine Na

## 2022-08-31 NOTE — ED NOTES
Assumed care of pt to ed for copd exacerbation.  At this time, pt is breathing in full sentences, breathing even and unlabored on 1L, which he wears at home.  He has no needs at this time.

## 2022-08-31 NOTE — HPI
Mr. Mccray is a 61 yo male with PMHx of Afib, HTN, and COPD on 2L NC and nightly BiPAP at home. Presents to the ER for worsening SOB for 1 week with concerns for acute COPD exacerbation. Of note, patient had a recent hospitalization from 8/10-8/12 for COPD exacerbation and was treated with PO steroids, scheduled duonebs, and PO azithromycin. Patient complains of shortness of breath at rest, while getting dressed.  He takes his Breztri and albuterol inhaler as directed which normally helps with SOB, however it has not recently helped prompting him to call EMS. He has noticed he becomes SOB after 5-6 hrs of using the BiPAP. Symptoms include constant cough, difficulty breathing, dry cough, dyspnea on exertion, and dyspnea when laying down. Symptoms began 1 week ago, unchanged since that time.  Patient denies chest pain, drainage from nose, lower extremity edema, hemoptysis, sputum production, or chest tightness. Patient has not had recent travel.  He has noticed some weight loss, d/t decreased appetite, attributed to increased SOB. Symptoms are exacerbated by minimal activity and rest. Symptoms are alleviated by oxygen and medication(s) (albuterol). Pt lives alone at home with cat.     ED Course:  SpO2 was 72% on 2 L. EMS gave Solu-Medrol duo nebs. Upon reachin gto the ED, Distress improved but had some heezing and was saturating 89% on home 2LNC. Initial labs notable for troponin slightly elevated 0.079 with repeat downtrending to 0.073. His Na was decreased 131 and HCO3 increased at 30. EKG showed no ST elevation or concerning EKG changes. CXR showed no consolidation or pleural effusion or pneumothorax. Received albuterol nebulizer 10 mg at 12:18am and 5 mg nebulizer at 4am. On my evaluation, pt was no longer showing signs of respiratory distress and overall stable and saturating 94% on 3L NC. He is being admitted to observation.       FHx:  Mother passed from accident   Father had colon bowel resection and passed at  age 67.    SocHx:  Tobacco use: 1.5 pack/day for almost 30 years. D/t worsening SOB, only able to smoke 1 cigarrete  EtOH use: 1- 16 oz beer can/dy  Illicit drug use: denies  Functional status: independent on all ADL   Living situation: lives alone at home

## 2022-08-31 NOTE — PHARMACY MED REC
"Admission Medication History     The home medication history was taken by Onelia Ames.    You may go to "Admission" then "Reconcile Home Medications" tabs to review and/or act upon these items.     The home medication list has been updated by the Pharmacy department.   Please read ALL comments highlighted in yellow.   Please address this information as you see fit.    Feel free to contact us if you have any questions or require assistance.      The medications listed below were removed from the home medication list. Please reorder if appropriate:  Patient reports no longer taking the following medication(s):  ACETAMINOPHEN 325 MG TAB  CARVEDILOL 12.5 MG TAB  PANTOPRAZOLE 40 MG TAB    Medications listed below were obtained from: Patient    Current Outpatient Medications on File Prior to Encounter   Medication Sig    albuterol (PROVENTIL/VENTOLIN HFA) 90 mcg/actuation inhaler   Inhale 1-2 puffs into the lungs every 6 (six) hours as needed for Wheezing. Rescue    albuterol-ipratropium (DUO-NEB) 2.5 mg-0.5 mg/3 mL nebulizer solution   Take 3 mLs by nebulization 2 (two) times daily. Rescue.    azithromycin (ZITHROMAX) 500 MG tablet   Take 1 tablet (500 mg total) by mouth every Mon, Wed, Fri.    budesonide-glycopyr-formoterol (BREZTRI AEROSPHERE) 160-9-4.8 mcg/actuation HFAA    Roflumilast (DALIRESP) 500 mcg Tab   Inhale 2 puffs into the lungs 2 (two) times daily. Rinse mouth after use. CONTROL INHALER        Take 500 mg by mouth once daily.    ferrous sulfate 325 (65 FE) MG EC tablet   Take 1 tablet (325 mg total) by mouth once daily.    furosemide (LASIX) 20 MG tablet   Take 2 tablets (40 mg total) by mouth once daily.    ibuprofen (ADVIL,MOTRIN) 200 MG tablet   Take 800 mg by mouth daily as needed for Pain.    nicotine (NICODERM CQ) 7 mg/24 hr   Place 1 patch onto the skin once daily.       Potential issues to be addressed PRIOR TO DISCHARGE  Patient requires education regarding drug therapies   Drug cost interfering " with therapy: (NICOTINE PATCH)  Patient requested refills for the following medications: (ALBUTEROL HFA, DALIRESP)    Onelia Ames CPhT  EXT [id number]                  .

## 2022-08-31 NOTE — NURSING
Pt arrived on unit. Aaox4 . 2L NC. VSS. No S/S of distress. Pt oriented to room and call light. Pt instructed to call before getting out of bed. Safety maintained.

## 2022-08-31 NOTE — ED PROVIDER NOTES
"Encounter Date: 8/31/2022       History     Chief Complaint   Patient presents with    Shortness of Breath     Pt arrives via EMS c/o SOB. Pt's O2 sats 78% on 2L upon arrival to pt's house. DuoNeb administered en route. O2 sats 100%.     62-year-old male multiple medical problems including CHF, COPD on night O2, coronary artery disease hypertension presents the ER for evaluation of shortness of breath.  Patient reports that he is having "breathing issues "since last night.  Reports symptoms improved with albuterol.  Today progressively worsening shortness of breath, no improvement albuterol called EMS noted patient was 70% on room air.  Gave duo nebs Solu-Medrol oxygen with improvement in symptoms.  Patient transported to the ER for further evaluation.    Review of patient's allergies indicates:   Allergen Reactions    Benazepril Swelling     Past Medical History:   Diagnosis Date    Asthma     Atrial fibrillation with rapid ventricular response     CHF (congestive heart failure)     COPD (chronic obstructive pulmonary disease)     home O2 at night only    Coronary artery disease     Hypertension     Lung nodule     Pneumonia     Seizures      Past Surgical History:   Procedure Laterality Date    ESOPHAGOGASTRODUODENOSCOPY N/A 2/11/2022    Procedure: EGD (ESOPHAGOGASTRODUODENOSCOPY);  Surgeon: Cain Ortega MD;  Location: [facility] ENDO [address]);  Service: Endoscopy;  Laterality: N/A;    LEFT HEART CATHETERIZATION  4/23/2020    Procedure: Left heart cath;  Surgeon: Nic Pollack MD;  Location: [facility] CATH LAB;  Service: Cardiology;;     Family History   Problem Relation Age of Onset    Cancer Father     Hypertension Sister     Stroke Sister     Stroke Brother     Diabetes Neg Hx     Heart disease Neg Hx      Social History     Tobacco Use    Smoking status: Some Days     Packs/day: 0.25     Types: Cigarettes    Smokeless tobacco: Never    Tobacco comments:     1-2 per day   Substance Use Topics    Alcohol use: Yes "     Alcohol/week: 2.0 standard drinks     Types: 2 Cans of beer per week     Comment: every night    Drug use: No     Review of Systems   Constitutional:  Positive for fatigue.   Respiratory:  Positive for cough, shortness of breath and wheezing.    Cardiovascular:  Negative for chest pain.   All other systems reviewed and are negative.    Physical Exam     Initial Vitals [08/31/22 0003]   BP Pulse Resp Temp SpO2   104/76 83 (!) 26 97.7 °F (36.5 °C) 100 %      MAP       --         Physical Exam    Nursing note and vitals reviewed.  Constitutional:   Elderly, chronically ill-appearing   HENT:   Head: Normocephalic and atraumatic.   Eyes: Pupils are equal, round, and reactive to light.   Neck:   Normal range of motion.  Cardiovascular:  Normal rate, regular rhythm and normal heart sounds.           Pulmonary/Chest: He is in respiratory distress. He has wheezes. He has rales.   Abdominal: Abdomen is soft. He exhibits no distension. There is no abdominal tenderness.   Musculoskeletal:         General: Normal range of motion.      Cervical back: Normal range of motion.     Neurological: He is alert and oriented to person, place, and time. He has normal strength. GCS score is 15. GCS eye subscore is 4. GCS verbal subscore is 5. GCS motor subscore is 6.   Skin: Skin is warm and dry. Capillary refill takes less than 2 seconds.   Psychiatric: He has a normal mood and affect. Thought content normal.       ED Course   Procedures  Labs Reviewed   CBC W/ AUTO DIFFERENTIAL - Abnormal; Notable for the following components:       Result Value    Hemoglobin 12.0 (*)     Hematocrit 39.9 (*)     MCV 76 (*)     MCH 22.8 (*)     MCHC 30.1 (*)     RDW 22.1 (*)     All other components within normal limits   COMPREHENSIVE METABOLIC PANEL - Abnormal; Notable for the following components:    Sodium 131 (*)     CO2 30 (*)     Albumin 2.9 (*)     ALT 7 (*)     Anion Gap 4 (*)     All other components within normal limits   TROPONIN I -  Abnormal; Notable for the following components:    Troponin I 0.079 (*)     All other components within normal limits   TROPONIN I - Abnormal; Notable for the following components:    Troponin I 0.073 (*)     All other components within normal limits   OSMOLALITY, SERUM - Abnormal; Notable for the following components:    Osmolality 302 (*)     All other components within normal limits   B-TYPE NATRIURETIC PEPTIDE - Abnormal; Notable for the following components:     (*)     All other components within normal limits   ISTAT PROCEDURE - Abnormal; Notable for the following components:    POC PH 7.274 (*)     POC PCO2 73.4 (*)     POC PO2 29 (*)     POC HCO3 34.0 (*)     POC SATURATED O2 44 (*)     POC TCO2 36 (*)     All other components within normal limits   SARS-COV-2 RNA AMPLIFICATION, QUAL   SODIUM, URINE, RANDOM    Narrative:     Specimen Source->Urine   OSMOLALITY, URINE RANDOM    Narrative:     Specimen Source->Urine        ECG Results              EKG 12-lead (Final result)  Result time 08/31/22 13:44:56      Final result by Interface, Lab In [facility] (08/31/22 13:44:56)                   Narrative:    Test Reason : E87.1,    Vent. Rate : 087 BPM     Atrial Rate : 087 BPM     P-R Int : 176 ms          QRS Dur : 078 ms      QT Int : 398 ms       P-R-T Axes : 081 084 084 degrees     QTc Int : 478 ms    Poor data quality  Normal sinus rhythm  When compared with ECG of 10-AUG-2022 09:15,  Poor data quality in current ECG precludes serial comparison  Confirmed by MICHAEL CARRERA MD ([id number]) on 8/31/2022 1:44:41 PM    Referred By: [email or web address]   SELF           Confirmed By:MICHAEL CARRERA MD                                  Imaging Results              X-Ray Chest AP Portable (Final result)  Result time 08/31/22 00:32:26      Final result by Haja He MD (08/31/22 00:32:26)                   Impression:      Stable radiographic appearance without radiographic evidence for superimposed acute intrathoracic  process.      Electronically signed by: Haja He  Date:    08/31/2022  Time:    00:32               Narrative:    EXAMINATION:  XR CHEST AP PORTABLE    CLINICAL HISTORY:  Chronic obstructive pulmonary disease, unspecified    TECHNIQUE:  Single frontal view of the chest was performed.    COMPARISON:  Chest radiograph August 10, 2022    FINDINGS:  The cardiomediastinal silhouette appears stable, mild aortic atherosclerotic change noted.  Pulmonary bronchovascular markings appears stable.  Chronic change noted, there is no evidence for superimposed confluent infiltrate or consolidation, significant pleural effusion or pneumothorax.  The osseous structures appear intact with chronic change noted.                                       Medications   cefTRIAXone (ROCEPHIN) 2 g/50 mL D5W IVPB (0 g Intravenous Stopped 8/31/22 0745)   azithromycin tablet 500 mg (500 mg Oral Given 8/31/22 0847)   albuterol-ipratropium 2.5 mg-0.5 mg/3 mL nebulizer solution 3 mL (3 mLs Nebulization Given 8/31/22 1915)   predniSONE tablet 40 mg (40 mg Oral Given 8/31/22 0847)   sodium chloride 0.9% flush 10 mL (has no administration in time range)   naloxone 0.4 mg/mL injection 0.02 mg (has no administration in time range)   glucose chewable tablet 16 g (has no administration in time range)   glucose chewable tablet 24 g (has no administration in time range)   glucagon (human recombinant) injection 1 mg (has no administration in time range)   dextrose 10% bolus 125 mL (has no administration in time range)   dextrose 10% bolus 250 mL (has no administration in time range)   enoxaparin injection 40 mg (has no administration in time range)   acetaminophen tablet 650 mg (has no administration in time range)   melatonin tablet 6 mg (has no administration in time range)   carvediloL tablet 12.5 mg (12.5 mg Oral Given 8/31/22 1828)   furosemide tablet 40 mg (40 mg Oral Given 8/31/22 0848)   fluticasone propionate 50 mcg/actuation nasal spray 100  mcg (has no administration in time range)   albuterol sulfate nebulizer solution 10 mg (10 mg Nebulization Given 8/31/22 0018)   sodium chloride 0.9% bolus 500 mL (0 mLs Intravenous Stopped 8/31/22 0333)   aspirin EC tablet 325 mg (325 mg Oral Given 8/31/22 0145)   albuterol sulfate nebulizer solution 5 mg (5 mg Nebulization Given 8/31/22 0404)     Medical Decision Making:   Initial Assessment:   8-year-old male presents the ER for evaluation of likely COPD exacerbation.  Onset 2 days.  Normally controlling it with his albuterol inhaler but earlier tonight no improvement.  He was 70% on room air.  EMS gave duo nebs with improvement in symptoms.  still has wounds noted to on exam.  Will plan further albuterol treatments.  EMS gave patient steroids.  Will reassess likely plan admission.           ED Course as of 08/31/22 2044   Wed Aug 31, 2022   0423 Resting in bed no acute distress.  Clinically patient appears to be improving.  Was given multiple rounds of albuterol.  EMS gave patient Solu-Medrol.  Still desats.  Would benefit for admission for further treatment. [SE]      ED Course User Index  [SE] Charlene Cornejo MD             Clinical Impression:   Final diagnoses:  [J44.1] COPD exacerbation  [J44.9] COPD (chronic obstructive pulmonary disease)        ED Disposition Condition    Observation                 Charlene Cornejo MD  08/31/22 2044

## 2022-08-31 NOTE — ASSESSMENT & PLAN NOTE
Chronic Diastolic HF  - no signs of VO on exam  - Most recent TTE on 8/11/2022 demonstrates: LVef 55%,   Results for orders placed during the hospital encounter of 08/10/22    Echo    Interpretation Summary  · The left ventricle is normal in size with concentric remodeling and normal systolic function.  · The estimated ejection fraction is 55%.  · Indeterminate left ventricular diastolic function.  · There is abnormal septal wall motion.  · Normal right ventricular size with normal right ventricular systolic function.  · Intermediate central venous pressure (8 mmHg).  · Severe left atrial enlargement.  · An interatrial septal aneurysm is present.    - EKG : no ST elevations or concerning EKG changes  - Troponin downtrending   - F/u on BNP   -  CXR showing no signs of volume overload  - Fluid restriction at <1.5L with strict I/Os and daily weights   - Continue home diuretic dose: 40mg Lasix and monitor response.  Goal diuresis 2-3L/day.     - Continue home coreg  - Up to date risk stratification : TSH, Lipids, HbA1c with optimization of risk factors is necessary  - Check Electrolytes, keep Mag >2 & K+ >4  - SCDs, TEDs, Nursing communication to elevated LE   - Ambulate as tolerated

## 2022-08-31 NOTE — ASSESSMENT & PLAN NOTE
Most recent PFTs showed severe obstruction with severely depressed DLCO.   Current home regimen: Breztri 2 puffs BID and duo-neb as rescue inhaler, rofumilast, and azithromycin 500 mg MWF  Followed by Dr. Pascal  Smoking Hx: Current smoker 1-2 cigarette  CXR showed no evidence of consolidation or signifant pleural effusion or PNA.   SpO2 97% on 3L NC      Plan:  - ABx Ceftriaxone 1g X 3 days and azithromycin 500 mg X 3 days   - Duo-nebs q6hr PRN  - Prednisone 40mg daily x 5 days  - Wean supplemental O2 with goal SpO2 88-92%  - Emphasized importance of smoking cessation

## 2022-08-31 NOTE — PLAN OF CARE
62M essential HTN, chronic diastolic heart failure, COPD on home O2, LIBRA on nightly BiPAP here for evaluation of COPD exacerbation. Patient started on abx, steroids, and duonebs to good effect. PharmD to visit with patient to assess outpatient inhaler technique. Patient improving and anticipate d/c home without services 9/1 with close outpatient PCP f/u, as well as consideration for pulmonary medicine referral.

## 2022-08-31 NOTE — PLAN OF CARE
08/31/22 0930   Post-Acute Status   Post-Acute Authorization Home Health   Home Health Status Pending medical clearance/testing   Coverage [employer]   Hospital Resources/Appts/Education Provided   (Will need PCP f/u)   Discharge Delays None known at this time   Discharge Plan   Discharge Plan A Home;Home with family   Discharge Plan B Home;Home Health     Will need to confirm PCP follow up appt w/[facility] prior to d/c. [place] [facility] [place], will need to reschedule if still admitted.     Lizette Flores, MANAS  ED   Care Management  [facility]  Ext. [phone]

## 2022-09-01 LAB
ALBUMIN SERPL BCP-MCNC: 3.1 G/DL (ref 3.5–5.2)
ALP SERPL-CCNC: 80 U/L (ref 55–135)
ALT SERPL W/O P-5'-P-CCNC: 7 U/L (ref 10–44)
ANION GAP SERPL CALC-SCNC: 8 MMOL/L (ref 8–16)
AST SERPL-CCNC: 11 U/L (ref 10–40)
BASOPHILS # BLD AUTO: 0.01 K/UL (ref 0–0.2)
BASOPHILS NFR BLD: 0.2 % (ref 0–1.9)
BILIRUB SERPL-MCNC: 0.4 MG/DL (ref 0.1–1)
BUN SERPL-MCNC: 17 MG/DL (ref 6–20)
CALCIUM SERPL-MCNC: 9.1 MG/DL (ref 8.7–10.5)
CHLORIDE SERPL-SCNC: 98 MMOL/L (ref 95–110)
CO2 SERPL-SCNC: 32 MMOL/L (ref 23–29)
CREAT SERPL-MCNC: 0.8 MG/DL (ref 0.5–1.4)
DIFFERENTIAL METHOD: ABNORMAL
EOSINOPHIL # BLD AUTO: 0 K/UL (ref 0–0.5)
EOSINOPHIL NFR BLD: 0 % (ref 0–8)
ERYTHROCYTE [DISTWIDTH] IN BLOOD BY AUTOMATED COUNT: 21.7 % (ref 11.5–14.5)
EST. GFR  (NO RACE VARIABLE): >60 ML/MIN/1.73 M^2
GLUCOSE SERPL-MCNC: 103 MG/DL (ref 70–110)
HCT VFR BLD AUTO: 37.8 % (ref 40–54)
HGB BLD-MCNC: 11.8 G/DL (ref 14–18)
IMM GRANULOCYTES # BLD AUTO: 0.01 K/UL (ref 0–0.04)
IMM GRANULOCYTES NFR BLD AUTO: 0.2 % (ref 0–0.5)
LYMPHOCYTES # BLD AUTO: 0.8 K/UL (ref 1–4.8)
LYMPHOCYTES NFR BLD: 13.9 % (ref 18–48)
MAGNESIUM SERPL-MCNC: 2 MG/DL (ref 1.6–2.6)
MCH RBC QN AUTO: 23.6 PG (ref 27–31)
MCHC RBC AUTO-ENTMCNC: 31.2 G/DL (ref 32–36)
MCV RBC AUTO: 76 FL (ref 82–98)
MONOCYTES # BLD AUTO: 0.3 K/UL (ref 0.3–1)
MONOCYTES NFR BLD: 6.1 % (ref 4–15)
NEUTROPHILS # BLD AUTO: 4.3 K/UL (ref 1.8–7.7)
NEUTROPHILS NFR BLD: 79.6 % (ref 38–73)
NRBC BLD-RTO: 0 /100 WBC
PHOSPHATE SERPL-MCNC: 3.5 MG/DL (ref 2.7–4.5)
PLATELET # BLD AUTO: 329 K/UL (ref 150–450)
PMV BLD AUTO: 10.5 FL (ref 9.2–12.9)
POTASSIUM SERPL-SCNC: 4.2 MMOL/L (ref 3.5–5.1)
PROT SERPL-MCNC: 6.1 G/DL (ref 6–8.4)
RBC # BLD AUTO: 5 M/UL (ref 4.6–6.2)
SODIUM SERPL-SCNC: 138 MMOL/L (ref 136–145)
WBC # BLD AUTO: 5.39 K/UL (ref 3.9–12.7)

## 2022-09-01 PROCEDURE — 94640 AIRWAY INHALATION TREATMENT: CPT

## 2022-09-01 PROCEDURE — 99232 SBSQ HOSP IP/OBS MODERATE 35: CPT | Mod: GC,,, | Performed by: HOSPITALIST

## 2022-09-01 PROCEDURE — 36415 COLL VENOUS BLD VENIPUNCTURE: CPT

## 2022-09-01 PROCEDURE — 63600175 PHARM REV CODE 636 W HCPCS

## 2022-09-01 PROCEDURE — 99900035 HC TECH TIME PER 15 MIN (STAT)

## 2022-09-01 PROCEDURE — 94640 AIRWAY INHALATION TREATMENT: CPT | Mod: XB

## 2022-09-01 PROCEDURE — 94761 N-INVAS EAR/PLS OXIMETRY MLT: CPT

## 2022-09-01 PROCEDURE — G0378 HOSPITAL OBSERVATION PER HR: HCPCS

## 2022-09-01 PROCEDURE — 25000242 PHARM REV CODE 250 ALT 637 W/ HCPCS: Performed by: HOSPITALIST

## 2022-09-01 PROCEDURE — S4991 NICOTINE PATCH NONLEGEND: HCPCS

## 2022-09-01 PROCEDURE — 84100 ASSAY OF PHOSPHORUS: CPT

## 2022-09-01 PROCEDURE — 96372 THER/PROPH/DIAG INJ SC/IM: CPT

## 2022-09-01 PROCEDURE — 27000190 HC CPAP FULL FACE MASK W/VALVE

## 2022-09-01 PROCEDURE — 63700000 PHARM REV CODE 250 ALT 637 W/O HCPCS

## 2022-09-01 PROCEDURE — 85025 COMPLETE CBC W/AUTO DIFF WBC: CPT

## 2022-09-01 PROCEDURE — 25000003 PHARM REV CODE 250

## 2022-09-01 PROCEDURE — 99232 PR SUBSEQUENT HOSPITAL CARE,LEVL II: ICD-10-PCS | Mod: GC,,, | Performed by: HOSPITALIST

## 2022-09-01 PROCEDURE — 96366 THER/PROPH/DIAG IV INF ADDON: CPT

## 2022-09-01 PROCEDURE — 94660 CPAP INITIATION&MGMT: CPT

## 2022-09-01 PROCEDURE — 83735 ASSAY OF MAGNESIUM: CPT

## 2022-09-01 PROCEDURE — 27000221 HC OXYGEN, UP TO 24 HOURS

## 2022-09-01 PROCEDURE — 80053 COMPREHEN METABOLIC PANEL: CPT

## 2022-09-01 PROCEDURE — 25000242 PHARM REV CODE 250 ALT 637 W/ HCPCS

## 2022-09-01 RX ORDER — FLUTICASONE FUROATE AND VILANTEROL 100; 25 UG/1; UG/1
1 POWDER RESPIRATORY (INHALATION) DAILY
Status: DISCONTINUED | OUTPATIENT
Start: 2022-09-01 | End: 2022-09-01

## 2022-09-01 RX ORDER — IBUPROFEN 200 MG
1 TABLET ORAL DAILY
Status: DISCONTINUED | OUTPATIENT
Start: 2022-09-01 | End: 2022-09-02 | Stop reason: HOSPADM

## 2022-09-01 RX ORDER — MAG HYDROX/ALUMINUM HYD/SIMETH 200-200-20
30 SUSPENSION, ORAL (FINAL DOSE FORM) ORAL EVERY 6 HOURS PRN
Status: DISCONTINUED | OUTPATIENT
Start: 2022-09-02 | End: 2022-09-02 | Stop reason: HOSPADM

## 2022-09-01 RX ADMIN — NICOTINE 1 PATCH: 14 PATCH, EXTENDED RELEASE TRANSDERMAL at 03:09

## 2022-09-01 RX ADMIN — CEFTRIAXONE SODIUM 2 G: 2 INJECTION, SOLUTION INTRAVENOUS at 06:09

## 2022-09-01 RX ADMIN — IPRATROPIUM BROMIDE AND ALBUTEROL SULFATE 3 ML: 2.5; .5 SOLUTION RESPIRATORY (INHALATION) at 01:09

## 2022-09-01 RX ADMIN — FLUTICASONE PROPIONATE 100 MCG: 50 SPRAY, METERED NASAL at 08:09

## 2022-09-01 RX ADMIN — IPRATROPIUM BROMIDE AND ALBUTEROL SULFATE 3 ML: 2.5; .5 SOLUTION RESPIRATORY (INHALATION) at 12:09

## 2022-09-01 RX ADMIN — CARVEDILOL 12.5 MG: 12.5 TABLET, FILM COATED ORAL at 07:09

## 2022-09-01 RX ADMIN — IPRATROPIUM BROMIDE AND ALBUTEROL SULFATE 3 ML: 2.5; .5 SOLUTION RESPIRATORY (INHALATION) at 09:09

## 2022-09-01 RX ADMIN — CARVEDILOL 12.5 MG: 12.5 TABLET, FILM COATED ORAL at 04:09

## 2022-09-01 RX ADMIN — ENOXAPARIN SODIUM 40 MG: 100 INJECTION SUBCUTANEOUS at 04:09

## 2022-09-01 RX ADMIN — ACETAMINOPHEN 650 MG: 325 TABLET ORAL at 04:09

## 2022-09-01 RX ADMIN — PREDNISONE 40 MG: 20 TABLET ORAL at 08:09

## 2022-09-01 RX ADMIN — AZITHROMYCIN MONOHYDRATE 500 MG: 250 TABLET ORAL at 08:09

## 2022-09-01 RX ADMIN — FUROSEMIDE 40 MG: 40 TABLET ORAL at 08:09

## 2022-09-01 NOTE — SUBJECTIVE & OBJECTIVE
Interval History: No acute events overnight. Patient has complaints of feeling more short of breath with Bipap and breathing medication. O2 sat stable at 94%. Pharmacy to speak to patient at bedside about appropriate way to take medications.     Review of Systems   Constitutional:  Negative for chills and fever.   Eyes:  Negative for visual disturbance.   Respiratory:  Positive for cough and shortness of breath.    Cardiovascular:  Negative for chest pain and leg swelling.   Gastrointestinal:  Negative for abdominal pain, diarrhea and nausea.   Genitourinary:  Negative for dysuria and hematuria.   Musculoskeletal:  Negative for arthralgias and myalgias.   Neurological:  Negative for light-headedness and headaches.   Objective:     Vital Signs (Most Recent):  Temp: 98 °F (36.7 °C) (09/01/22 1207)  Pulse: 70 (09/01/22 1259)  Resp: 16 (09/01/22 1259)  BP: (!) 159/81 (09/01/22 1207)  SpO2: 95 % (09/01/22 1259)   Vital Signs (24h Range):  Temp:  [97.9 °F (36.6 °C)-98.9 °F (37.2 °C)] 98 °F (36.7 °C)  Pulse:  [56-83] 70  Resp:  [16-24] 16  SpO2:  [80 %-98 %] 95 %  BP: (159-180)/(75-86) 159/81     Weight: 60.3 kg (132 lb 15 oz)  Body mass index is 19.63 kg/m².    Intake/Output Summary (Last 24 hours) at 9/1/2022 1419  Last data filed at 9/1/2022 0400  Gross per 24 hour   Intake --   Output 550 ml   Net -550 ml      Physical Exam  Vitals reviewed.   Constitutional:       General: He is not in acute distress.     Appearance: Normal appearance. He is not ill-appearing.   HENT:      Head: Normocephalic and atraumatic.      Mouth/Throat:      Mouth: Mucous membranes are moist.      Pharynx: Oropharynx is clear.   Eyes:      Extraocular Movements: Extraocular movements intact.      Conjunctiva/sclera: Conjunctivae normal.   Cardiovascular:      Rate and Rhythm: Normal rate and regular rhythm.   Pulmonary:      Effort: Pulmonary effort is normal. No respiratory distress.      Breath sounds: No wheezing.      Comments: Coarse  breath sounds  Abdominal:      General: Bowel sounds are normal. There is no distension.      Palpations: Abdomen is soft.      Tenderness: There is no abdominal tenderness.   Musculoskeletal:         General: Normal range of motion.      Right lower leg: No edema.      Left lower leg: No edema.   Skin:     General: Skin is warm.      Findings: No bruising or erythema.   Neurological:      Mental Status: He is alert and oriented to person, place, and time. Mental status is at baseline.   Psychiatric:         Mood and Affect: Mood normal.         Behavior: Behavior normal.       Significant Labs: All pertinent labs within the past 24 hours have been reviewed.    Significant Imaging: I have reviewed all pertinent imaging results/findings within the past 24 hours.

## 2022-09-01 NOTE — HOSPITAL COURSE
This is a 61 y/o male with PMHx Afib on Carvedilol, HTN, HFpEF, COPD on 2L NC admitted with acute COPD exacerbation. This is his ninth hospitalization this year for acute exacerbation. Patient received Solumedrol Duo-Nebs, was started on Ceftriaxone with Azithromycin, Prednisone and scheduled nebulizer treatments. Inhaler technique was also reviewed with the patient. In the ED he had elevated troponin but normal EKG, and as troponin did not continue to rise this was likely a type II NSTEMI related to his COPD exacerbation. Placed on ASA 81mg qd for primary prevention and advised to continue as outpatient. Patient's shortness of breath improved and he was satting well on his baseline 2L at time of discharge. Patient instructed to continue Cefpodoxime for 7 days total, and home azithromycin as previously scheduled, Prednisone for 5 days total, and resume his home medications upon discharge with close outpatient follow up with PCP and Pulmonology.

## 2022-09-01 NOTE — ASSESSMENT & PLAN NOTE
Likely secondary to poor PO intake vs primary polydipsia vs underlying malignancy vs SIADH     Plan:  - IVF bolus received in ED  - Fluid restriction not recommended at this time  - Serum osm, urine osm, urine Na consistent with hypovolemia. Sodium improved to 138 on 9/1

## 2022-09-01 NOTE — ASSESSMENT & PLAN NOTE
Currently smokes around 1 cigarette per day. Agrees that it makes his breathing and COPD worse    -will give nicotine patch for cravings/avoid withdrawal

## 2022-09-01 NOTE — PROGRESS NOTES
[facility] - [facility]  Progress Note    Patient Name: Baltazar Mccray  MRN: [id number]  Patient Class: OP- Observation   Admission Date: 8/31/2022  Length of Stay: 0 days  Attending Physician: Rocío Tolentino*  Primary Care Provider: [facility]        Subjective:     Principal Problem:COPD exacerbation        HPI:  Mr. Mccray is a 63 yo male with PMHx of Afib, HTN, and COPD on 2L NC and nightly BiPAP at home. Presents to the ER for worsening SOB for 1 week with concerns for acute COPD exacerbation. Of note, patient had a recent hospitalization from 8/10-8/12 for COPD exacerbation and was treated with PO steroids, scheduled duonebs, and PO azithromycin. Patient complains of shortness of breath at rest, while getting dressed.  He takes his Breztri and albuterol inhaler as directed which normally helps with SOB, however it has not recently helped prompting him to call EMS. He has noticed he becomes SOB after 5-6 hrs of using the BiPAP. Symptoms include constant cough, difficulty breathing, dry cough, dyspnea on exertion, and dyspnea when laying down. Symptoms began 1 week ago, unchanged since that time.  Patient denies chest pain, drainage from nose, lower extremity edema, hemoptysis, sputum production, or chest tightness. Patient has not had recent travel.  He has noticed some weight loss, d/t decreased appetite, attributed to increased SOB. Symptoms are exacerbated by minimal activity and rest. Symptoms are alleviated by oxygen and medication(s) (albuterol). Pt lives alone at home with cat.     ED Course:  SpO2 was 72% on 2 L. EMS gave Solu-Medrol duo nebs. Upon reachin gto the ED, Distress improved but had some heezing and was saturating 89% on home 2LNC. Initial labs notable for troponin slightly elevated 0.079 with repeat downtrending to 0.073. His Na was decreased 131 and HCO3 increased at 30. EKG showed no ST elevation or concerning EKG changes. CXR showed no  consolidation or pleural effusion or pneumothorax. Received albuterol nebulizer 10 mg at 12:18am and 5 mg nebulizer at 4am. On my evaluation, pt was no longer showing signs of respiratory distress and overall stable and saturating 94% on 3L NC. He is being admitted to observation.       FHx:  Mother passed from accident   Father had colon bowel resection and passed at age 67.    SocHx:  Tobacco use: 1.5 pack/day for almost 30 years. D/t worsening SOB, only able to smoke 1 cigarrete  EtOH use: 1- 16 oz beer can/dy  Illicit drug use: denies  Functional status: independent on all ADL   Living situation: lives alone at home           Overview/Hospital Course:  62 year old man admitted to hospital medicine admitted for COPD exacerbation. Patient received solumedrol duo nebs, was started on ceftriaxone with azithromycin, prednisone and scheduled nebulizer treatments. His shortness of breath and oxygenation subsequently improved to 1L nasal cannula. Some concern from prior admissions that he was taking his rescue inhaler as his controller medication. Will review inhaler technique and medications with patient closer to discharge. Plan for 7 days CAP coverage and total 5 days prednisone with close outpatient follow up with PCP and pulmonology.      Interval History: No acute events overnight. Patient has complaints of feeling more short of breath with Bipap and breathing medication. O2 sat stable at 94%. Pharmacy to speak to patient at bedside about appropriate way to take medications.     Review of Systems   Constitutional:  Negative for chills and fever.   Eyes:  Negative for visual disturbance.   Respiratory:  Positive for cough and shortness of breath.    Cardiovascular:  Negative for chest pain and leg swelling.   Gastrointestinal:  Negative for abdominal pain, diarrhea and nausea.   Genitourinary:  Negative for dysuria and hematuria.   Musculoskeletal:  Negative for arthralgias and myalgias.   Neurological:  Negative  for light-headedness and headaches.   Objective:     Vital Signs (Most Recent):  Temp: 98 °F (36.7 °C) (09/01/22 1207)  Pulse: 70 (09/01/22 1259)  Resp: 16 (09/01/22 1259)  BP: (!) 159/81 (09/01/22 1207)  SpO2: 95 % (09/01/22 1259)   Vital Signs (24h Range):  Temp:  [97.9 °F (36.6 °C)-98.9 °F (37.2 °C)] 98 °F (36.7 °C)  Pulse:  [56-83] 70  Resp:  [16-24] 16  SpO2:  [80 %-98 %] 95 %  BP: (159-180)/(75-86) 159/81     Weight: 60.3 kg (132 lb 15 oz)  Body mass index is 19.63 kg/m².    Intake/Output Summary (Last 24 hours) at 9/1/2022 1419  Last data filed at 9/1/2022 0400  Gross per 24 hour   Intake --   Output 550 ml   Net -550 ml      Physical Exam  Vitals reviewed.   Constitutional:       General: He is not in acute distress.     Appearance: Normal appearance. He is not ill-appearing.   HENT:      Head: Normocephalic and atraumatic.      Mouth/Throat:      Mouth: Mucous membranes are moist.      Pharynx: Oropharynx is clear.   Eyes:      Extraocular Movements: Extraocular movements intact.      Conjunctiva/sclera: Conjunctivae normal.   Cardiovascular:      Rate and Rhythm: Normal rate and regular rhythm.   Pulmonary:      Effort: Pulmonary effort is normal. No respiratory distress.      Breath sounds: No wheezing.      Comments: Coarse breath sounds  Abdominal:      General: Bowel sounds are normal. There is no distension.      Palpations: Abdomen is soft.      Tenderness: There is no abdominal tenderness.   Musculoskeletal:         General: Normal range of motion.      Right lower leg: No edema.      Left lower leg: No edema.   Skin:     General: Skin is warm.      Findings: No bruising or erythema.   Neurological:      Mental Status: He is alert and oriented to person, place, and time. Mental status is at baseline.   Psychiatric:         Mood and Affect: Mood normal.         Behavior: Behavior normal.       Significant Labs: All pertinent labs within the past 24 hours have been reviewed.    Significant Imaging: I  have reviewed all pertinent imaging results/findings within the past 24 hours.      Assessment/Plan:      * COPD exacerbation  Most recent PFTs showed severe obstruction with severely depressed DLCO.   Current home regimen: Breztri 2 puffs BID and duo-neb as rescue inhaler, rofumilast, and azithromycin 500 mg MWF  Followed by Dr. Pascal  Smoking Hx: Current smoker 1-2 cigarette  CXR showed no evidence of consolidation or signifant pleural effusion or PNA.   SpO2 97% on 3L NC, down to 1L with O2 sats 94%.      Plan:  - ABx Ceftriaxone 1g X 3 days and azithromycin 500 mg X 3 days. Patient on azithro 500 mg as outpatient indefinitely. Will continue course once discharge.    - Duo-nebs q6hr PRN  - Prednisone 40mg daily x 5 days  - Wean supplemental O2 with goal SpO2 88-92%  - Emphasized importance of smoking cessation        Hyponatremia  Likely secondary to poor PO intake vs primary polydipsia vs underlying malignancy vs SIADH     Plan:  - IVF bolus received in ED  - Fluid restriction not recommended at this time  - Serum osm, urine osm, urine Na consistent with hypovolemia. Sodium improved to 138 on 9/1        BiPAP (biphasic positive airway pressure) dependence  - BiPAP QHS      Chronic diastolic congestive heart failure  Chronic Diastolic HF  - no signs of VO on exam  - Most recent TTE on 8/11/2022 demonstrates: LVef 55%,   Results for orders placed during the hospital encounter of 08/10/22    Echo    Interpretation Summary  · The left ventricle is normal in size with concentric remodeling and normal systolic function.  · The estimated ejection fraction is 55%.  · Indeterminate left ventricular diastolic function.  · There is abnormal septal wall motion.  · Normal right ventricular size with normal right ventricular systolic function.  · Intermediate central venous pressure (8 mmHg).  · Severe left atrial enlargement.  · An interatrial septal aneurysm is present.    - EKG : no ST elevations or concerning EKG  changes  - Troponin downtrending   - F/u on BNP   -  CXR showing no signs of volume overload  - Fluid restriction at <1.5L with strict I/Os and daily weights   - Continue home diuretic dose: 40mg Lasix and monitor response.  Goal diuresis 2-3L/day.     - Continue home coreg  - Up to date risk stratification : TSH, Lipids, HbA1c with optimization of risk factors is necessary  - Check Electrolytes, keep Mag >2 & K+ >4  - SCDs, TEDs, Nursing communication to elevated LE   - Ambulate as tolerated    Tobacco abuse  Currently smokes around 1 cigarette per day. Agrees that it makes his breathing and COPD worse    -will give nicotine patch for cravings/avoid withdrawal      Elevated troponin  Troponin elevated on admission, downtrending  - will stop checking repeat troponin as no EKG changes and troponin decreased.   - f/u on repeat EKG    Essential hypertension  - continue home coreg        VTE Risk Mitigation (From admission, onward)         Ordered     enoxaparin injection 40 mg  Daily         08/31/22 0548     IP VTE HIGH RISK PATIENT  Once         08/31/22 0548     Place sequential compression device  Until discontinued         08/31/22 0548                Discharge Planning   RUTH: 9/1/2022     Code Status: Full Code   Is the patient medically ready for discharge?: Yes    Reason for patient still in hospital (select all that apply): Patient trending condition and Treatment  Discharge Plan A: Home, Home with family   Discharge Delays: None known at this time              Olivia Diaz MD  Department of Hospital Medicine   [facility] - Observation

## 2022-09-01 NOTE — ASSESSMENT & PLAN NOTE
Most recent PFTs showed severe obstruction with severely depressed DLCO.   Current home regimen: Breztri 2 puffs BID and duo-neb as rescue inhaler, rofumilast, and azithromycin 500 mg MWF  Followed by Dr. Pascal  Smoking Hx: Current smoker 1-2 cigarette  CXR showed no evidence of consolidation or signifant pleural effusion or PNA.   SpO2 97% on 3L NC, down to 1L with O2 sats 94%.      Plan:  - ABx Ceftriaxone 1g X 3 days and azithromycin 500 mg X 3 days. Patient on azithro 500 mg as outpatient indefinitely. Will continue course once discharge.    - Duo-nebs q6hr PRN  - Prednisone 40mg daily x 5 days  - Wean supplemental O2 with goal SpO2 88-92%  - Emphasized importance of smoking cessation

## 2022-09-01 NOTE — RESPIRATORY THERAPY
RAPID RESPONSE RESPIRATORY CHART CHECK       Chart check completed, bedside RNKelly contacted for SpO2 of 80% charted by PCT.  She rechecked and he has a sat of 95% .  Instructed to call [phone] for further concerns or assistance.

## 2022-09-01 NOTE — ASSESSMENT & PLAN NOTE
Troponin elevated on admission, downtrending  - will stop checking repeat troponin as no EKG changes and troponin decreased.   - f/u on repeat EKG

## 2022-09-02 VITALS
BODY MASS INDEX: 19.69 KG/M2 | WEIGHT: 132.94 LBS | HEIGHT: 69 IN | DIASTOLIC BLOOD PRESSURE: 86 MMHG | HEART RATE: 62 BPM | TEMPERATURE: 98 F | OXYGEN SATURATION: 97 % | SYSTOLIC BLOOD PRESSURE: 172 MMHG | RESPIRATION RATE: 19 BRPM

## 2022-09-02 LAB
ALBUMIN SERPL BCP-MCNC: 2.9 G/DL (ref 3.5–5.2)
ALP SERPL-CCNC: 69 U/L (ref 55–135)
ALT SERPL W/O P-5'-P-CCNC: 7 U/L (ref 10–44)
ANION GAP SERPL CALC-SCNC: 10 MMOL/L (ref 8–16)
AST SERPL-CCNC: 11 U/L (ref 10–40)
BASOPHILS # BLD AUTO: 0.02 K/UL (ref 0–0.2)
BASOPHILS NFR BLD: 0.2 % (ref 0–1.9)
BILIRUB SERPL-MCNC: 0.3 MG/DL (ref 0.1–1)
BUN SERPL-MCNC: 21 MG/DL (ref 6–20)
CALCIUM SERPL-MCNC: 9.4 MG/DL (ref 8.7–10.5)
CHLORIDE SERPL-SCNC: 99 MMOL/L (ref 95–110)
CO2 SERPL-SCNC: 34 MMOL/L (ref 23–29)
CREAT SERPL-MCNC: 0.8 MG/DL (ref 0.5–1.4)
DIFFERENTIAL METHOD: ABNORMAL
EOSINOPHIL # BLD AUTO: 0 K/UL (ref 0–0.5)
EOSINOPHIL NFR BLD: 0 % (ref 0–8)
ERYTHROCYTE [DISTWIDTH] IN BLOOD BY AUTOMATED COUNT: 21.8 % (ref 11.5–14.5)
EST. GFR  (NO RACE VARIABLE): >60 ML/MIN/1.73 M^2
GLUCOSE SERPL-MCNC: 76 MG/DL (ref 70–110)
HCT VFR BLD AUTO: 41.8 % (ref 40–54)
HGB BLD-MCNC: 12.5 G/DL (ref 14–18)
IMM GRANULOCYTES # BLD AUTO: 0.03 K/UL (ref 0–0.04)
IMM GRANULOCYTES NFR BLD AUTO: 0.4 % (ref 0–0.5)
LYMPHOCYTES # BLD AUTO: 1.4 K/UL (ref 1–4.8)
LYMPHOCYTES NFR BLD: 16.8 % (ref 18–48)
MAGNESIUM SERPL-MCNC: 2 MG/DL (ref 1.6–2.6)
MCH RBC QN AUTO: 22.8 PG (ref 27–31)
MCHC RBC AUTO-ENTMCNC: 29.9 G/DL (ref 32–36)
MCV RBC AUTO: 76 FL (ref 82–98)
MONOCYTES # BLD AUTO: 0.8 K/UL (ref 0.3–1)
MONOCYTES NFR BLD: 9.8 % (ref 4–15)
NEUTROPHILS # BLD AUTO: 5.9 K/UL (ref 1.8–7.7)
NEUTROPHILS NFR BLD: 72.8 % (ref 38–73)
NRBC BLD-RTO: 0 /100 WBC
PHOSPHATE SERPL-MCNC: 3.5 MG/DL (ref 2.7–4.5)
PLATELET # BLD AUTO: 351 K/UL (ref 150–450)
PMV BLD AUTO: 10.6 FL (ref 9.2–12.9)
POTASSIUM SERPL-SCNC: 3.9 MMOL/L (ref 3.5–5.1)
PROT SERPL-MCNC: 6.3 G/DL (ref 6–8.4)
RBC # BLD AUTO: 5.49 M/UL (ref 4.6–6.2)
SODIUM SERPL-SCNC: 143 MMOL/L (ref 136–145)
WBC # BLD AUTO: 8.15 K/UL (ref 3.9–12.7)

## 2022-09-02 PROCEDURE — 99238 PR HOSPITAL DISCHARGE DAY,<30 MIN: ICD-10-PCS | Mod: GC,,, | Performed by: HOSPITALIST

## 2022-09-02 PROCEDURE — 27000221 HC OXYGEN, UP TO 24 HOURS

## 2022-09-02 PROCEDURE — 25000242 PHARM REV CODE 250 ALT 637 W/ HCPCS

## 2022-09-02 PROCEDURE — 85025 COMPLETE CBC W/AUTO DIFF WBC: CPT

## 2022-09-02 PROCEDURE — 36415 COLL VENOUS BLD VENIPUNCTURE: CPT

## 2022-09-02 PROCEDURE — 25000003 PHARM REV CODE 250

## 2022-09-02 PROCEDURE — 94640 AIRWAY INHALATION TREATMENT: CPT | Mod: XB

## 2022-09-02 PROCEDURE — 84100 ASSAY OF PHOSPHORUS: CPT

## 2022-09-02 PROCEDURE — 63600175 PHARM REV CODE 636 W HCPCS

## 2022-09-02 PROCEDURE — 94761 N-INVAS EAR/PLS OXIMETRY MLT: CPT

## 2022-09-02 PROCEDURE — 83735 ASSAY OF MAGNESIUM: CPT

## 2022-09-02 PROCEDURE — 99900035 HC TECH TIME PER 15 MIN (STAT)

## 2022-09-02 PROCEDURE — 99238 HOSP IP/OBS DSCHRG MGMT 30/<: CPT | Mod: GC,,, | Performed by: HOSPITALIST

## 2022-09-02 PROCEDURE — 63700000 PHARM REV CODE 250 ALT 637 W/O HCPCS

## 2022-09-02 PROCEDURE — S4991 NICOTINE PATCH NONLEGEND: HCPCS

## 2022-09-02 PROCEDURE — G0378 HOSPITAL OBSERVATION PER HR: HCPCS

## 2022-09-02 PROCEDURE — 80053 COMPREHEN METABOLIC PANEL: CPT

## 2022-09-02 PROCEDURE — 96366 THER/PROPH/DIAG IV INF ADDON: CPT

## 2022-09-02 RX ORDER — PREDNISONE 20 MG/1
40 TABLET ORAL DAILY
Qty: 6 TABLET | Refills: 0 | Status: SHIPPED | OUTPATIENT
Start: 2022-09-02 | End: 2022-09-05

## 2022-09-02 RX ORDER — NIFEDIPINE 30 MG/1
30 TABLET, EXTENDED RELEASE ORAL DAILY
Qty: 30 TABLET | Refills: 11 | Status: ON HOLD | OUTPATIENT
Start: 2022-09-02 | End: 2022-10-20 | Stop reason: SDUPTHER

## 2022-09-02 RX ORDER — NIFEDIPINE 30 MG/1
30 TABLET, EXTENDED RELEASE ORAL DAILY
Status: DISCONTINUED | OUTPATIENT
Start: 2022-09-02 | End: 2022-09-02 | Stop reason: HOSPADM

## 2022-09-02 RX ORDER — CEFPODOXIME PROXETIL 100 MG/1
200 TABLET, FILM COATED ORAL EVERY 12 HOURS
Qty: 16 TABLET | Refills: 0 | Status: SHIPPED | OUTPATIENT
Start: 2022-09-02 | End: 2022-09-06

## 2022-09-02 RX ORDER — ALBUTEROL SULFATE 90 UG/1
1-2 AEROSOL, METERED RESPIRATORY (INHALATION) EVERY 6 HOURS PRN
Qty: 8.5 G | Refills: 11 | Status: ON HOLD | OUTPATIENT
Start: 2022-09-02 | End: 2022-10-20 | Stop reason: SDUPTHER

## 2022-09-02 RX ADMIN — AZITHROMYCIN MONOHYDRATE 500 MG: 250 TABLET ORAL at 10:09

## 2022-09-02 RX ADMIN — NIFEDIPINE 30 MG: 30 TABLET, FILM COATED, EXTENDED RELEASE ORAL at 10:09

## 2022-09-02 RX ADMIN — ALUMINUM HYDROXIDE, MAGNESIUM HYDROXIDE, AND SIMETHICONE 30 ML: 200; 200; 20 SUSPENSION ORAL at 12:09

## 2022-09-02 RX ADMIN — NICOTINE 1 PATCH: 14 PATCH, EXTENDED RELEASE TRANSDERMAL at 10:09

## 2022-09-02 RX ADMIN — IPRATROPIUM BROMIDE AND ALBUTEROL SULFATE 3 ML: 2.5; .5 SOLUTION RESPIRATORY (INHALATION) at 12:09

## 2022-09-02 RX ADMIN — CEFTRIAXONE SODIUM 2 G: 2 INJECTION, SOLUTION INTRAVENOUS at 06:09

## 2022-09-02 RX ADMIN — FUROSEMIDE 40 MG: 40 TABLET ORAL at 10:09

## 2022-09-02 RX ADMIN — PREDNISONE 40 MG: 20 TABLET ORAL at 10:09

## 2022-09-02 RX ADMIN — IPRATROPIUM BROMIDE AND ALBUTEROL SULFATE 3 ML: 2.5; .5 SOLUTION RESPIRATORY (INHALATION) at 07:09

## 2022-09-02 NOTE — PLAN OF CARE
[facility] - Observation  Discharge Final Note    Primary Care Provider: [facility]    Expected Discharge Date: 9/2/2022    Final Discharge Note (most recent)       Final Note - 09/02/22 1524          Final Note    Assessment Type Final Discharge Note     Anticipated Discharge Disposition Home or Self Care     Hospital Resources/Appts/Education Provided Appointments scheduled and added to AVS        Post-Acute Status    Post-Acute Authorization Other     Other Status No Post-Acute Service Needs                     Important Message from Medicare             Contact Info       [facility]   Relationship: PCP - General    [address]  [facility] [address]   Phone: [phone]       Next Steps: Follow up          Future Appointments   Date Time Provider Department Center   9/12/2022  9:00 AM MD JEFFREY [facility]   9/19/2022  3:00 PM Susan MURPHY [facility]   9/28/2022  8:00 AM Susan Rae, NP [address]

## 2022-09-02 NOTE — PLAN OF CARE
Problem: Adult Inpatient Plan of Care  Goal: Plan of Care Review  Outcome: Met  Goal: Patient-Specific Goal (Individualized)  Outcome: Met  Goal: Absence of Hospital-Acquired Illness or Injury  9/2/2022 1047 by Cristina Sigaal RN  Outcome: Met  9/2/2022 1046 by Cristnia Sigala RN  Outcome: Ongoing, Progressing  Goal: Optimal Comfort and Wellbeing  9/2/2022 1047 by Cristina Sigala RN  Outcome: Met  9/2/2022 1046 by Cristina Sigala RN  Outcome: Ongoing, Progressing  Goal: Readiness for Transition of Care  Outcome: Met

## 2022-09-02 NOTE — DISCHARGE SUMMARY
[facility] - [facility] Medicine  Discharge Summary      Patient Name: Baltazar Mccray  MRN: [id number]  Patient Class: OP- Observation  Admission Date: 8/31/2022  Hospital Length of Stay: 0 days  Discharge Date and Time:  09/02/2022 4:52 PM  Attending Physician: No att. providers found   Discharging Provider: Keshawn Dhaliwal MD  Primary Care Provider: [facility] Medicine Team: [facility] 3 Keshawn Dhaliwal MD    HPI:   Mr. Mccray is a 61 yo male with PMHx of Afib, HTN, and COPD on 2L NC and nightly BiPAP at home. Presents to the ER for worsening SOB for 1 week with concerns for acute COPD exacerbation. Of note, patient had a recent hospitalization from 8/10-8/12 for COPD exacerbation and was treated with PO steroids, scheduled duonebs, and PO azithromycin. Patient complains of shortness of breath at rest, while getting dressed.  He takes his Breztri and albuterol inhaler as directed which normally helps with SOB, however it has not recently helped prompting him to call EMS. He has noticed he becomes SOB after 5-6 hrs of using the BiPAP. Symptoms include constant cough, difficulty breathing, dry cough, dyspnea on exertion, and dyspnea when laying down. Symptoms began 1 week ago, unchanged since that time.  Patient denies chest pain, drainage from nose, lower extremity edema, hemoptysis, sputum production, or chest tightness. Patient has not had recent travel.  He has noticed some weight loss, d/t decreased appetite, attributed to increased SOB. Symptoms are exacerbated by minimal activity and rest. Symptoms are alleviated by oxygen and medication(s) (albuterol). Pt lives alone at home with cat.     ED Course:  SpO2 was 72% on 2 L. EMS gave Solu-Medrol duo nebs. Upon reachin gto the ED, Distress improved but had some heezing and was saturating 89% on home 2LNC. Initial labs notable for troponin slightly elevated 0.079 with repeat downtrending to 0.073. His Na was decreased 131 and HCO3  increased at 30. EKG showed no ST elevation or concerning EKG changes. CXR showed no consolidation or pleural effusion or pneumothorax. Received albuterol nebulizer 10 mg at 12:18am and 5 mg nebulizer at 4am. On my evaluation, pt was no longer showing signs of respiratory distress and overall stable and saturating 94% on 3L NC. He is being admitted to observation.       FHx:  Mother passed from accident   Father had colon bowel resection and passed at age 67.    SocHx:  Tobacco use: 1.5 pack/day for almost 30 years. D/t worsening SOB, only able to smoke 1 cigarrete  EtOH use: 1- 16 oz beer can/dy  Illicit drug use: denies  Functional status: independent on all ADL   Living situation: lives alone at home           Hospital Course:   This is a 61 y/o male with PMHx Afib on Carvedilol, HTN, HFpEF, COPD on 2L NC admitted with acute COPD exacerbation. This is his ninth hospitalization this year for acute exacerbation. Patient received Solumedrol Duo-Nebs, was started on Ceftriaxone with Azithromycin, Prednisone and scheduled nebulizer treatments. Inhaler technique was also reviewed with the patient. In the ED he had elevated troponin but normal EKG, and as troponin did not continue to rise this was likely a type II NSTEMI related to his COPD exacerbation. Placed on ASA 81mg qd for primary prevention and advised to continue as outpatient. Patient's shortness of breath improved and he was satting well on his baseline 2L at time of discharge. Patient instructed to continue Cefpodoxime for 7 days total, and home azithromycin as previously scheduled, Prednisone for 5 days total, and resume his home medications upon discharge with close outpatient follow up with PCP and Pulmonology.      Review of Systems   Constitutional:  Negative for chills and fever.   Eyes:  Negative for visual disturbance.   Respiratory:  Positive for shortness of breath. Negative for cough.    Cardiovascular:  Negative for chest pain and leg swelling.    Gastrointestinal:  Negative for abdominal pain, diarrhea and nausea.   Genitourinary:  Negative for dysuria and hematuria.   Musculoskeletal:  Negative for arthralgias and myalgias.   Neurological:  Negative for light-headedness and headaches.   Objective:      Vital Signs (Most Recent):  Temp: 98 °F (36.7 °C) (09/02/22 1125)  Pulse: 62 (09/02/22 1206)  Resp: 19 (09/02/22 1206)  BP: (!) 172/86 (09/02/22 1125)  SpO2: 97 % (09/02/22 1206)    Vital Signs (24h Range):  Temp:  [97.2 °F (36.2 °C)-98.8 °F (37.1 °C)] 98 °F (36.7 °C)  Pulse:  [55-69] 62  Resp:  [18-20] 19  SpO2:  [92 %-99 %] 97 %  BP: (160-194)/(79-96) 172/86      Weight: 60.3 kg (132 lb 15 oz)  Body mass index is 19.63 kg/m².     Intake/Output Summary (Last 24 hours) at 9/2/2022 1645  Last data filed at 9/2/2022 0600  Gross per 24 hour   Intake --   Output 300 ml   Net -300 ml          Physical Exam  Vitals reviewed.   Constitutional:       General: He is not in acute distress.     Appearance: Normal appearance. He is not ill-appearing.   HENT:      Head: Normocephalic and atraumatic.      Mouth/Throat:      Mouth: Mucous membranes are moist.      Pharynx: Oropharynx is clear.   Eyes:      Extraocular Movements: Extraocular movements intact.      Conjunctiva/sclera: Conjunctivae normal.   Cardiovascular:      Rate and Rhythm: Normal rate and regular rhythm.   Pulmonary:      Effort: Pulmonary effort is normal. No respiratory distress.      Breath sounds: No wheezing.      Comments: Coarse breath sounds  Abdominal:      General: Bowel sounds are normal. There is no distension.      Palpations: Abdomen is soft.      Tenderness: There is no abdominal tenderness.   Musculoskeletal:         General: Normal range of motion.      Right lower leg: No edema.      Left lower leg: No edema.   Skin:     General: Skin is warm.      Findings: No bruising or erythema.   Neurological:      Mental Status: He is alert and oriented to person, place, and time. Mental status  is at baseline.   Psychiatric:         Mood and Affect: Mood normal.         Behavior: Behavior normal.     Goals of Care Treatment Preferences:  Code Status: Full Code      Consults:     * COPD exacerbation  Most recent PFTs showed severe obstruction with severely depressed DLCO.   Current home regimen: Breztri 2 puffs BID and duo-neb as rescue inhaler, rofumilast, and azithromycin 500 mg MWF  Followed by Dr. Pascal  Smoking Hx: Current smoker 1-2 cigarette  CXR showed no evidence of consolidation or signifant pleural effusion or PNA.   SpO2 97% on 3L NC, down to 1L with O2 sats 94%.      Plan:  - ABx Ceftriaxone 1g X 3 days and azithromycin 500 mg X 3 days. Patient on azithro 500 mg as outpatient indefinitely. Will continue course once discharge.    - D/C on cefpodoxime  - Duo-nebs q6hr PRN  - Prednisone 40mg daily x 5 days  - Wean supplemental O2 with goal SpO2 88-92%  - Emphasized importance of smoking cessation        BiPAP (biphasic positive airway pressure) dependence  - BiPAP QHS      Chronic diastolic congestive heart failure  Chronic Diastolic HF  - no signs of VO on exam  - Most recent TTE on 8/11/2022 demonstrates: LVef 55%,   Results for orders placed during the hospital encounter of 08/10/22    Echo    Interpretation Summary  · The left ventricle is normal in size with concentric remodeling and normal systolic function.  · The estimated ejection fraction is 55%.  · Indeterminate left ventricular diastolic function.  · There is abnormal septal wall motion.  · Normal right ventricular size with normal right ventricular systolic function.  · Intermediate central venous pressure (8 mmHg).  · Severe left atrial enlargement.  · An interatrial septal aneurysm is present.    - EKG : no ST elevations or concerning EKG changes  - Troponin downtrending   - F/u on BNP   -  CXR showing no signs of volume overload  - Fluid restriction at <1.5L with strict I/Os and daily weights   - Continue home diuretic dose: 40mg  Lasix and monitor response.  Goal diuresis 2-3L/day.     - Continue home coreg  - Up to date risk stratification : TSH, Lipids, HbA1c with optimization of risk factors is necessary  - Check Electrolytes, keep Mag >2 & K+ >4  - SCDs, TEDs, Nursing communication to elevated LE   - Ambulate as tolerated    Tobacco abuse  Currently smokes around 1 cigarette per day. Agrees that it makes his breathing and COPD worse    -will give nicotine patch for cravings/avoid withdrawal      Elevated troponin  Troponin elevated on admission, downtrending  - will stop checking repeat troponin as no EKG changes and troponin decreased.   - f/u on repeat EKG    Essential hypertension  - continue home coreg        Final Active Diagnoses:    Diagnosis Date Noted POA    PRINCIPAL PROBLEM:  COPD exacerbation [J44.1] 05/14/2017 Yes    Hyponatremia [E87.1] 07/22/2022 Yes    BiPAP (biphasic positive airway pressure) dependence [Z99.89]  Not Applicable    Chronic diastolic congestive heart failure [I50.32] 04/19/2020 Yes    Elevated troponin [R77.8] 08/12/2018 Yes    Tobacco abuse [Z72.0] 08/12/2018 Yes     Chronic    Essential hypertension [I10] 05/19/2017 Yes     Chronic      Problems Resolved During this Admission:       Discharged Condition: stable    Disposition: Home or Self Care    Follow Up:   Follow-up Information     [facility] Follow up.    Contact information:  [address]  [facility] [id number] [phone]                       Patient Instructions:      Ambulatory referral/consult to Outpatient Case Management   Referral Priority: Routine Referral Type: Consultation   Referral Reason: Specialty Services Required   Number of Visits Requested: 1       Significant Diagnostic Studies: Labs:   CMP   Recent Labs   Lab 09/01/22  0330 09/02/22  0445    143   K 4.2 3.9   CL 98 99   CO2 32* 34*    76   BUN 17 21*   CREATININE 0.8 0.8   CALCIUM 9.1 9.4   PROT 6.1 6.3   ALBUMIN 3.1* 2.9*    BILITOT 0.4 0.3   ALKPHOS 80 69   AST 11 11   ALT 7* 7*   ANIONGAP 8 10    and CBC   Recent Labs   Lab 09/01/22  0330 09/02/22  0445   WBC 5.39 8.15   HGB 11.8* 12.5*   HCT 37.8* 41.8    351       Pending Diagnostic Studies:     None         Medications:  Reconciled Home Medications:      Medication List      START taking these medications    cefpodoxime 100 MG tablet  Commonly known as: VANTIN  Take 2 tablets (200 mg total) by mouth every 12 (twelve) hours. for 4 days     NIFEdipine 30 MG (OSM) 24 hr tablet  Commonly known as: PROCARDIA-XL  Take 1 tablet (30 mg total) by mouth once daily.     predniSONE 20 MG tablet  Commonly known as: DELTASONE  Take 2 tablets (40 mg total) by mouth once daily. for 3 days        CHANGE how you take these medications    albuterol-ipratropium 2.5 mg-0.5 mg/3 mL nebulizer solution  Commonly known as: DUO-NEB  Take 3 mLs by nebulization every 4 (four) hours as needed for Wheezing or Shortness of Breath. Rescue  What changed: when to take this        CONTINUE taking these medications    albuterol 90 mcg/actuation inhaler  Commonly known as: PROVENTIL/VENTOLIN HFA  Inhale 1-2 puffs into the lungs every 6 (six) hours as needed for Wheezing. Rescue     azithromycin 500 MG tablet  Commonly known as: ZITHROMAX  Take 1 tablet (500 mg total) by mouth every Mon, Wed, Fri.     BREZTRI AEROSPHERE 160-9-4.8 mcg/actuation Hfaa  Generic drug: budesonide-glycopyr-formoterol  Inhale 2 puffs into the lungs 2 (two) times daily. Rinse mouth after use. CONTROL INHALER     DALIRESP 500 mcg Tab  Generic drug: roflumilast  Take 500 mcg by mouth once daily.     ferrous sulfate 325 (65 FE) MG EC tablet  Take 1 tablet (325 mg total) by mouth once daily.     furosemide 20 MG tablet  Commonly known as: LASIX  Take 2 tablets (40 mg total) by mouth once daily.     ibuprofen 200 MG tablet  Commonly known as: ADVIL,MOTRIN  Take 800 mg by mouth daily as needed for Pain.     nicotine 7 mg/24 hr  Commonly  known as: NICODERM CQ  Place 1 patch onto the skin once daily.        STOP taking these medications    SPIRIVA RESPIMAT 2.5 mcg/actuation inhaler  Generic drug: tiotropium bromide            Indwelling Lines/Drains at time of discharge:   Lines/Drains/Airways     None                 Time spent on the discharge of patient: 30 minutes         Keshawn Dhaliwal MD  Department of Hospital Medicine  [facility] - Observation

## 2022-09-02 NOTE — ASSESSMENT & PLAN NOTE
Most recent PFTs showed severe obstruction with severely depressed DLCO.   Current home regimen: Breztri 2 puffs BID and duo-neb as rescue inhaler, rofumilast, and azithromycin 500 mg MWF  Followed by Dr. Pascal  Smoking Hx: Current smoker 1-2 cigarette  CXR showed no evidence of consolidation or signifant pleural effusion or PNA.   SpO2 97% on 3L NC, down to 1L with O2 sats 94%.      Plan:  - ABx Ceftriaxone 1g X 3 days and azithromycin 500 mg X 3 days. Patient on azithro 500 mg as outpatient indefinitely. Will continue course once discharge.    - D/C on cefpodoxime  - Duo-nebs q6hr PRN  - Prednisone 40mg daily x 5 days  - Wean supplemental O2 with goal SpO2 88-92%  - Emphasized importance of smoking cessation

## 2022-09-02 NOTE — SUBJECTIVE & OBJECTIVE
Interval History:     Review of Systems   Constitutional:  Negative for chills and fever.   Eyes:  Negative for visual disturbance.   Respiratory:  Positive for shortness of breath. Negative for cough.    Cardiovascular:  Negative for chest pain and leg swelling.   Gastrointestinal:  Negative for abdominal pain, diarrhea and nausea.   Genitourinary:  Negative for dysuria and hematuria.   Musculoskeletal:  Negative for arthralgias and myalgias.   Neurological:  Negative for light-headedness and headaches.   Objective:     Vital Signs (Most Recent):  Temp: 98 °F (36.7 °C) (09/02/22 1125)  Pulse: 62 (09/02/22 1206)  Resp: 19 (09/02/22 1206)  BP: (!) 172/86 (09/02/22 1125)  SpO2: 97 % (09/02/22 1206)   Vital Signs (24h Range):  Temp:  [97.2 °F (36.2 °C)-98.8 °F (37.1 °C)] 98 °F (36.7 °C)  Pulse:  [55-69] 62  Resp:  [18-20] 19  SpO2:  [92 %-99 %] 97 %  BP: (160-194)/(79-96) 172/86     Weight: 60.3 kg (132 lb 15 oz)  Body mass index is 19.63 kg/m².    Intake/Output Summary (Last 24 hours) at 9/2/2022 1645  Last data filed at 9/2/2022 0600  Gross per 24 hour   Intake --   Output 300 ml   Net -300 ml      Physical Exam  Vitals reviewed.   Constitutional:       General: He is not in acute distress.     Appearance: Normal appearance. He is not ill-appearing.   HENT:      Head: Normocephalic and atraumatic.      Mouth/Throat:      Mouth: Mucous membranes are moist.      Pharynx: Oropharynx is clear.   Eyes:      Extraocular Movements: Extraocular movements intact.      Conjunctiva/sclera: Conjunctivae normal.   Cardiovascular:      Rate and Rhythm: Normal rate and regular rhythm.   Pulmonary:      Effort: Pulmonary effort is normal. No respiratory distress.      Breath sounds: No wheezing.      Comments: Coarse breath sounds  Abdominal:      General: Bowel sounds are normal. There is no distension.      Palpations: Abdomen is soft.      Tenderness: There is no abdominal tenderness.   Musculoskeletal:         General: Normal  range of motion.      Right lower leg: No edema.      Left lower leg: No edema.   Skin:     General: Skin is warm.      Findings: No bruising or erythema.   Neurological:      Mental Status: He is alert and oriented to person, place, and time. Mental status is at baseline.   Psychiatric:         Mood and Affect: Mood normal.         Behavior: Behavior normal.       Significant Labs: All pertinent labs within the past 24 hours have been reviewed.  CBC:   Recent Labs   Lab 09/01/22  0330 09/02/22  0445   WBC 5.39 8.15   HGB 11.8* 12.5*   HCT 37.8* 41.8    351     CMP:   Recent Labs   Lab 09/01/22  0330 09/02/22  0445    143   K 4.2 3.9   CL 98 99   CO2 32* 34*    76   BUN 17 21*   CREATININE 0.8 0.8   CALCIUM 9.1 9.4   PROT 6.1 6.3   ALBUMIN 3.1* 2.9*   BILITOT 0.4 0.3   ALKPHOS 80 69   AST 11 11   ALT 7* 7*   ANIONGAP 8 10       Significant Imaging: I have reviewed all pertinent imaging results/findings within the past 24 hours.

## 2022-09-11 ENCOUNTER — HOSPITAL ENCOUNTER (INPATIENT)
Facility: HOSPITAL | Age: 62
LOS: 2 days | Discharge: HOME | DRG: 189 | End: 2022-09-13
Attending: EMERGENCY MEDICINE | Admitting: INTERNAL MEDICINE
Payer: MEDICARE

## 2022-09-11 DIAGNOSIS — R06.02 SHORTNESS OF BREATH: ICD-10-CM

## 2022-09-11 DIAGNOSIS — R79.89 ELEVATED TROPONIN: ICD-10-CM

## 2022-09-11 DIAGNOSIS — E16.2 HYPOGLYCEMIA: Primary | ICD-10-CM

## 2022-09-11 DIAGNOSIS — J96.02 ACUTE RESPIRATORY FAILURE WITH HYPERCAPNIA: ICD-10-CM

## 2022-09-11 DIAGNOSIS — J44.1 COPD EXACERBATION: ICD-10-CM

## 2022-09-11 PROBLEM — I50.30 (HFPEF) HEART FAILURE WITH PRESERVED EJECTION FRACTION: Status: ACTIVE | Noted: 2022-09-11

## 2022-09-11 LAB
ALBUMIN SERPL BCP-MCNC: 3.5 G/DL (ref 3.5–5.2)
ALLENS TEST: ABNORMAL
ALP SERPL-CCNC: 73 U/L (ref 55–135)
ALT SERPL W/O P-5'-P-CCNC: 9 U/L (ref 10–44)
ANION GAP SERPL CALC-SCNC: 13 MMOL/L (ref 8–16)
AST SERPL-CCNC: 14 U/L (ref 10–40)
BACTERIA SPEC AEROBE CULT: NORMAL
BASOPHILS # BLD AUTO: 0.07 K/UL (ref 0–0.2)
BASOPHILS NFR BLD: 0.5 % (ref 0–1.9)
BILIRUB SERPL-MCNC: 0.3 MG/DL (ref 0.1–1)
BNP SERPL-MCNC: 45 PG/ML (ref 0–99)
BUN SERPL-MCNC: 12 MG/DL (ref 6–20)
CALCIUM SERPL-MCNC: 9.2 MG/DL (ref 8.7–10.5)
CHLORIDE SERPL-SCNC: 92 MMOL/L (ref 95–110)
CO2 SERPL-SCNC: 31 MMOL/L (ref 23–29)
CREAT SERPL-MCNC: 0.9 MG/DL (ref 0.5–1.4)
DELSYS: ABNORMAL
DIFFERENTIAL METHOD: ABNORMAL
EOSINOPHIL # BLD AUTO: 0.6 K/UL (ref 0–0.5)
EOSINOPHIL NFR BLD: 4.3 % (ref 0–8)
EP: 5
EP: 5
ERYTHROCYTE [DISTWIDTH] IN BLOOD BY AUTOMATED COUNT: 22.7 % (ref 11.5–14.5)
EST. GFR  (NO RACE VARIABLE): >60 ML/MIN/1.73 M^2
FIO2: 30
FIO2: 40
FLOW: 4
GLUCOSE SERPL-MCNC: 58 MG/DL (ref 70–110)
GRAM STN SPEC: NORMAL
GRAM STN SPEC: NORMAL
HCO3 UR-SCNC: 33.1 MMOL/L (ref 24–28)
HCO3 UR-SCNC: 36 MMOL/L (ref 24–28)
HCO3 UR-SCNC: 40.9 MMOL/L (ref 24–28)
HCT VFR BLD AUTO: 45.3 % (ref 40–54)
HGB BLD-MCNC: 13.9 G/DL (ref 14–18)
IMM GRANULOCYTES # BLD AUTO: 0.04 K/UL (ref 0–0.04)
IMM GRANULOCYTES NFR BLD AUTO: 0.3 % (ref 0–0.5)
IP: 12
IP: 12
LYMPHOCYTES # BLD AUTO: 3.7 K/UL (ref 1–4.8)
LYMPHOCYTES NFR BLD: 27.3 % (ref 18–48)
MCH RBC QN AUTO: 23.9 PG (ref 27–31)
MCHC RBC AUTO-ENTMCNC: 30.7 G/DL (ref 32–36)
MCV RBC AUTO: 78 FL (ref 82–98)
MODE: ABNORMAL
MONOCYTES # BLD AUTO: 0.8 K/UL (ref 0.3–1)
MONOCYTES NFR BLD: 6 % (ref 4–15)
NEUTROPHILS # BLD AUTO: 8.4 K/UL (ref 1.8–7.7)
NEUTROPHILS NFR BLD: 61.6 % (ref 38–73)
NRBC BLD-RTO: 0 /100 WBC
PCO2 BLDA: 66.9 MMHG (ref 35–45)
PCO2 BLDA: 80.5 MMHG (ref 35–45)
PCO2 BLDA: 83.9 MMHG (ref 35–45)
PH SMN: 7.26 [PH] (ref 7.35–7.45)
PH SMN: 7.3 [PH] (ref 7.35–7.45)
PH SMN: 7.3 [PH] (ref 7.35–7.45)
PLATELET # BLD AUTO: 501 K/UL (ref 150–450)
PMV BLD AUTO: 9.4 FL (ref 9.2–12.9)
PO2 BLDA: 45 MMHG (ref 40–60)
PO2 BLDA: 48 MMHG (ref 40–60)
PO2 BLDA: 55 MMHG (ref 40–60)
POC BE: 14 MMOL/L
POC BE: 7 MMOL/L
POC BE: 9 MMOL/L
POC SATURATED O2: 73 % (ref 95–100)
POC SATURATED O2: 75 % (ref 95–100)
POC SATURATED O2: 84 % (ref 95–100)
POC TCO2: 35 MMOL/L (ref 24–29)
POC TCO2: 38 MMOL/L (ref 24–29)
POC TCO2: 43 MMOL/L (ref 24–29)
POCT GLUCOSE: 201 MG/DL (ref 70–110)
POTASSIUM SERPL-SCNC: 4.3 MMOL/L (ref 3.5–5.1)
PROCALCITONIN SERPL IA-MCNC: 0.18 NG/ML
PROT SERPL-MCNC: 7.4 G/DL (ref 6–8.4)
RBC # BLD AUTO: 5.81 M/UL (ref 4.6–6.2)
SAMPLE: ABNORMAL
SARS-COV-2 RDRP RESP QL NAA+PROBE: NEGATIVE
SITE: ABNORMAL
SODIUM SERPL-SCNC: 136 MMOL/L (ref 136–145)
SP02: 98
TROPONIN I SERPL DL<=0.01 NG/ML-MCNC: 0.03 NG/ML (ref 0–0.03)
TROPONIN I SERPL DL<=0.01 NG/ML-MCNC: 0.03 NG/ML (ref 0–0.03)
TROPONIN I SERPL DL<=0.01 NG/ML-MCNC: 0.04 NG/ML (ref 0–0.03)
WBC # BLD AUTO: 13.64 K/UL (ref 3.9–12.7)

## 2022-09-11 PROCEDURE — 87205 SMEAR GRAM STAIN: CPT | Performed by: STUDENT IN AN ORGANIZED HEALTH CARE EDUCATION/TRAINING PROGRAM

## 2022-09-11 PROCEDURE — 27000190 HC CPAP FULL FACE MASK W/VALVE

## 2022-09-11 PROCEDURE — 99900035 HC TECH TIME PER 15 MIN (STAT)

## 2022-09-11 PROCEDURE — 84484 ASSAY OF TROPONIN QUANT: CPT | Performed by: EMERGENCY MEDICINE

## 2022-09-11 PROCEDURE — 94640 AIRWAY INHALATION TREATMENT: CPT | Mod: XB

## 2022-09-11 PROCEDURE — 99223 PR INITIAL HOSPITAL CARE,LEVL III: ICD-10-PCS | Mod: AI,GC,, | Performed by: INTERNAL MEDICINE

## 2022-09-11 PROCEDURE — 93005 ELECTROCARDIOGRAM TRACING: CPT

## 2022-09-11 PROCEDURE — 25000242 PHARM REV CODE 250 ALT 637 W/ HCPCS: Performed by: EMERGENCY MEDICINE

## 2022-09-11 PROCEDURE — 27000221 HC OXYGEN, UP TO 24 HOURS

## 2022-09-11 PROCEDURE — 93010 ELECTROCARDIOGRAM REPORT: CPT | Mod: ,,, | Performed by: INTERNAL MEDICINE

## 2022-09-11 PROCEDURE — 99900031 HC PATIENT EDUCATION (STAT)

## 2022-09-11 PROCEDURE — 84484 ASSAY OF TROPONIN QUANT: CPT | Mod: 91 | Performed by: STUDENT IN AN ORGANIZED HEALTH CARE EDUCATION/TRAINING PROGRAM

## 2022-09-11 PROCEDURE — 20000000 HC ICU ROOM

## 2022-09-11 PROCEDURE — 82962 GLUCOSE BLOOD TEST: CPT

## 2022-09-11 PROCEDURE — 63600175 PHARM REV CODE 636 W HCPCS: Performed by: INTERNAL MEDICINE

## 2022-09-11 PROCEDURE — 94640 AIRWAY INHALATION TREATMENT: CPT

## 2022-09-11 PROCEDURE — 84145 PROCALCITONIN (PCT): CPT | Performed by: STUDENT IN AN ORGANIZED HEALTH CARE EDUCATION/TRAINING PROGRAM

## 2022-09-11 PROCEDURE — 25000003 PHARM REV CODE 250: Performed by: STUDENT IN AN ORGANIZED HEALTH CARE EDUCATION/TRAINING PROGRAM

## 2022-09-11 PROCEDURE — 93010 EKG 12-LEAD: ICD-10-PCS | Mod: 76,,, | Performed by: INTERNAL MEDICINE

## 2022-09-11 PROCEDURE — 80053 COMPREHEN METABOLIC PANEL: CPT | Performed by: EMERGENCY MEDICINE

## 2022-09-11 PROCEDURE — 87070 CULTURE OTHR SPECIMN AEROBIC: CPT | Performed by: STUDENT IN AN ORGANIZED HEALTH CARE EDUCATION/TRAINING PROGRAM

## 2022-09-11 PROCEDURE — 82803 BLOOD GASES ANY COMBINATION: CPT

## 2022-09-11 PROCEDURE — 93010 ELECTROCARDIOGRAM REPORT: CPT | Mod: 76,,, | Performed by: INTERNAL MEDICINE

## 2022-09-11 PROCEDURE — 83880 ASSAY OF NATRIURETIC PEPTIDE: CPT | Performed by: EMERGENCY MEDICINE

## 2022-09-11 PROCEDURE — 25000242 PHARM REV CODE 250 ALT 637 W/ HCPCS: Performed by: INTERNAL MEDICINE

## 2022-09-11 PROCEDURE — 99291 CRITICAL CARE FIRST HOUR: CPT | Mod: ,,, | Performed by: EMERGENCY MEDICINE

## 2022-09-11 PROCEDURE — 99291 CRITICAL CARE FIRST HOUR: CPT | Mod: 25

## 2022-09-11 PROCEDURE — 96374 THER/PROPH/DIAG INJ IV PUSH: CPT

## 2022-09-11 PROCEDURE — U0002 COVID-19 LAB TEST NON-CDC: HCPCS | Performed by: EMERGENCY MEDICINE

## 2022-09-11 PROCEDURE — 25000003 PHARM REV CODE 250: Performed by: NURSE PRACTITIONER

## 2022-09-11 PROCEDURE — 99223 1ST HOSP IP/OBS HIGH 75: CPT | Mod: AI,GC,, | Performed by: INTERNAL MEDICINE

## 2022-09-11 PROCEDURE — 85025 COMPLETE CBC W/AUTO DIFF WBC: CPT | Performed by: EMERGENCY MEDICINE

## 2022-09-11 PROCEDURE — 94660 CPAP INITIATION&MGMT: CPT

## 2022-09-11 PROCEDURE — 94761 N-INVAS EAR/PLS OXIMETRY MLT: CPT

## 2022-09-11 PROCEDURE — 87040 BLOOD CULTURE FOR BACTERIA: CPT | Mod: 59 | Performed by: STUDENT IN AN ORGANIZED HEALTH CARE EDUCATION/TRAINING PROGRAM

## 2022-09-11 PROCEDURE — 99291 PR CRITICAL CARE, E/M 30-74 MINUTES: ICD-10-PCS | Mod: ,,, | Performed by: EMERGENCY MEDICINE

## 2022-09-11 PROCEDURE — 63600175 PHARM REV CODE 636 W HCPCS: Performed by: STUDENT IN AN ORGANIZED HEALTH CARE EDUCATION/TRAINING PROGRAM

## 2022-09-11 PROCEDURE — 25000003 PHARM REV CODE 250: Performed by: EMERGENCY MEDICINE

## 2022-09-11 RX ORDER — SODIUM CHLORIDE 0.9 % (FLUSH) 0.9 %
10 SYRINGE (ML) INJECTION
Status: DISCONTINUED | OUTPATIENT
Start: 2022-09-11 | End: 2022-09-13 | Stop reason: HOSPADM

## 2022-09-11 RX ORDER — FLUTICASONE FUROATE AND VILANTEROL 200; 25 UG/1; UG/1
1 POWDER RESPIRATORY (INHALATION) DAILY
Status: CANCELLED | OUTPATIENT
Start: 2022-09-11

## 2022-09-11 RX ORDER — PREDNISONE 20 MG/1
40 TABLET ORAL DAILY
Status: DISCONTINUED | OUTPATIENT
Start: 2022-09-11 | End: 2022-09-12

## 2022-09-11 RX ORDER — IBUPROFEN 200 MG
24 TABLET ORAL
Status: DISCONTINUED | OUTPATIENT
Start: 2022-09-11 | End: 2022-09-13 | Stop reason: HOSPADM

## 2022-09-11 RX ORDER — IPRATROPIUM BROMIDE AND ALBUTEROL SULFATE 2.5; .5 MG/3ML; MG/3ML
3 SOLUTION RESPIRATORY (INHALATION) EVERY 6 HOURS
Status: DISCONTINUED | OUTPATIENT
Start: 2022-09-11 | End: 2022-09-11

## 2022-09-11 RX ORDER — AZITHROMYCIN 250 MG/1
500 TABLET, FILM COATED ORAL DAILY
Status: COMPLETED | OUTPATIENT
Start: 2022-09-12 | End: 2022-09-13

## 2022-09-11 RX ORDER — IPRATROPIUM BROMIDE AND ALBUTEROL SULFATE 2.5; .5 MG/3ML; MG/3ML
3 SOLUTION RESPIRATORY (INHALATION) EVERY 4 HOURS
Status: DISCONTINUED | OUTPATIENT
Start: 2022-09-11 | End: 2022-09-13

## 2022-09-11 RX ORDER — GLUCAGON 1 MG
1 KIT INJECTION
Status: DISCONTINUED | OUTPATIENT
Start: 2022-09-11 | End: 2022-09-13 | Stop reason: HOSPADM

## 2022-09-11 RX ORDER — IBUPROFEN 200 MG
16 TABLET ORAL
Status: DISCONTINUED | OUTPATIENT
Start: 2022-09-11 | End: 2022-09-13 | Stop reason: HOSPADM

## 2022-09-11 RX ORDER — IPRATROPIUM BROMIDE AND ALBUTEROL SULFATE 2.5; .5 MG/3ML; MG/3ML
3 SOLUTION RESPIRATORY (INHALATION)
Status: COMPLETED | OUTPATIENT
Start: 2022-09-11 | End: 2022-09-11

## 2022-09-11 RX ORDER — PREDNISONE 20 MG/1
40 TABLET ORAL DAILY
Status: DISCONTINUED | OUTPATIENT
Start: 2022-09-11 | End: 2022-09-11

## 2022-09-11 RX ORDER — ASPIRIN 325 MG
325 TABLET ORAL DAILY
Status: DISCONTINUED | OUTPATIENT
Start: 2022-09-11 | End: 2022-09-11

## 2022-09-11 RX ORDER — ALBUTEROL SULFATE 90 UG/1
1 AEROSOL, METERED RESPIRATORY (INHALATION) EVERY 6 HOURS PRN
Status: DISCONTINUED | OUTPATIENT
Start: 2022-09-11 | End: 2022-09-13 | Stop reason: HOSPADM

## 2022-09-11 RX ORDER — METHOCARBAMOL 500 MG/1
500 TABLET, FILM COATED ORAL 4 TIMES DAILY PRN
Status: DISCONTINUED | OUTPATIENT
Start: 2022-09-11 | End: 2022-09-13 | Stop reason: HOSPADM

## 2022-09-11 RX ORDER — METHOCARBAMOL 500 MG/1
500 TABLET, FILM COATED ORAL 4 TIMES DAILY PRN
Status: DISCONTINUED | OUTPATIENT
Start: 2022-09-11 | End: 2022-09-11

## 2022-09-11 RX ORDER — TALC
6 POWDER (GRAM) TOPICAL NIGHTLY PRN
Status: DISCONTINUED | OUTPATIENT
Start: 2022-09-11 | End: 2022-09-13 | Stop reason: HOSPADM

## 2022-09-11 RX ORDER — ASPIRIN 325 MG
325 TABLET ORAL ONCE
Status: COMPLETED | OUTPATIENT
Start: 2022-09-11 | End: 2022-09-11

## 2022-09-11 RX ORDER — ACETAMINOPHEN 325 MG/1
650 TABLET ORAL EVERY 4 HOURS PRN
Status: DISCONTINUED | OUTPATIENT
Start: 2022-09-11 | End: 2022-09-13 | Stop reason: HOSPADM

## 2022-09-11 RX ORDER — FLUTICASONE FUROATE AND VILANTEROL 200; 25 UG/1; UG/1
1 POWDER RESPIRATORY (INHALATION) DAILY
Status: DISCONTINUED | OUTPATIENT
Start: 2022-09-12 | End: 2022-09-13 | Stop reason: HOSPADM

## 2022-09-11 RX ORDER — NIFEDIPINE 30 MG/1
30 TABLET, EXTENDED RELEASE ORAL DAILY
Status: DISCONTINUED | OUTPATIENT
Start: 2022-09-11 | End: 2022-09-13 | Stop reason: HOSPADM

## 2022-09-11 RX ADMIN — IPRATROPIUM BROMIDE AND ALBUTEROL SULFATE 3 ML: 2.5; .5 SOLUTION RESPIRATORY (INHALATION) at 08:09

## 2022-09-11 RX ADMIN — NIFEDIPINE 30 MG: 30 TABLET, FILM COATED, EXTENDED RELEASE ORAL at 09:09

## 2022-09-11 RX ADMIN — CEFTRIAXONE 1 G: 1 INJECTION, SOLUTION INTRAVENOUS at 06:09

## 2022-09-11 RX ADMIN — ASPIRIN 325 MG ORAL TABLET 325 MG: 325 PILL ORAL at 09:09

## 2022-09-11 RX ADMIN — IPRATROPIUM BROMIDE AND ALBUTEROL SULFATE 3 ML: 2.5; .5 SOLUTION RESPIRATORY (INHALATION) at 11:09

## 2022-09-11 RX ADMIN — IPRATROPIUM BROMIDE AND ALBUTEROL SULFATE 3 ML: 2.5; .5 SOLUTION RESPIRATORY (INHALATION) at 03:09

## 2022-09-11 RX ADMIN — METHOCARBAMOL 500 MG: 500 TABLET ORAL at 08:09

## 2022-09-11 RX ADMIN — DEXTROSE 250 ML: 10 SOLUTION INTRAVENOUS at 04:09

## 2022-09-11 RX ADMIN — IPRATROPIUM BROMIDE AND ALBUTEROL SULFATE 3 ML: 2.5; .5 SOLUTION RESPIRATORY (INHALATION) at 04:09

## 2022-09-11 RX ADMIN — AZITHROMYCIN 500 MG: 500 INJECTION, POWDER, LYOPHILIZED, FOR SOLUTION INTRAVENOUS at 05:09

## 2022-09-11 RX ADMIN — PREDNISONE 40 MG: 20 TABLET ORAL at 09:09

## 2022-09-11 NOTE — CONSULTS
This patient was seen and evaluated by Critical Care Team 2. CCS2 will assume care of patient. Full H&P to follow.     Amber Blas MD   Internal Medicine, PGY-3  Critical Care Medicine

## 2022-09-11 NOTE — ASSESSMENT & PLAN NOTE
Suspect T2 NSTEMI 2/2 demand in setting of COPD exacerbation.  EKG sinus tach similar to prior, without noted ischemic changes.     - f/u repeat trop

## 2022-09-11 NOTE — ASSESSMENT & PLAN NOTE
Hx of HFpEF. On home lasix 40 qd.   Euvolemic on admission.     - restart home lasix as able  - strict I/O

## 2022-09-11 NOTE — ASSESSMENT & PLAN NOTE
EKG sinus tach on admission.   Not on home anticoagulation or rate control.     - telemetry  - start BB if poor rate control  - consider AC after discussion with patient  - HR goal<110  - K>4, Mg>2

## 2022-09-11 NOTE — ED TRIAGE NOTES
Baltazar Mccray, a 62 y.o. male presents to the ED w/ complaint of increasing SOB that started tonight. Pt on 2 L home O2 for COPD. Pt.'s initial O2 sat 87% with EMS on 2L O2. Pt received 1 duoneb and 125 mg solumedrol in route to facility. Pt arrives on 10L O2 per nonrebreather with an O2 sat of 100%. +etoh.     Triage note:  Chief Complaint   Patient presents with    Shortness of Breath     Hx COPD,2 breathing treatments @ home without relief. Dating 87% on RA, 92% on duoneb with EMS. +ETOH today     Review of patient's allergies indicates:   Allergen Reactions    Benazepril Swelling     Past Medical History:   Diagnosis Date    Asthma     Atrial fibrillation with rapid ventricular response     CHF (congestive heart failure)     COPD (chronic obstructive pulmonary disease)     home O2 at night only    Coronary artery disease     Hypertension     Lung nodule     Pneumonia     Seizures

## 2022-09-11 NOTE — ED NOTES
RN notified critical care of Pt.'s decreased O2 saturation. Respiratory therapy at bedside adjusting bipap settings. Pt.'s O2 is now 98% on 50% O2. Pt remains cognitively intact at this time.

## 2022-09-11 NOTE — ASSESSMENT & PLAN NOTE
62 y.o. M with COPD on 2-3L O2, nightly BiPAP, hx of pAF (not on AC), ELIZABETH, HFpEF, HTN, lung nodule, hx of seizures (not on antiepileptics), alcohol use, recent admission for COPD exacerbation who presents by EMS for acute SOB.    Patient with Hypercapnic and Hypoxic Respiratory failure which is Acute on chronic.  he is on home oxygen at 2 LPM. Supplemental oxygen was provided and noted- Oxygen Concentration (%):  [40] 40.   Signs/symptoms of respiratory failure include- tachypnea, increased work of breathing and respiratory distress. Contributing diagnoses includes - Aspiration, COPD and Pneumonia Labs and images were reviewed. Patient Has recent ABG, which has been reviewed. Will treat underlying causes and adjust management of respiratory failure as follows:     Admission VBG 7.29/83/45/40, repeat VBG after continuous biPAP for 2 hours--> 7.25/80/48/36 with continued respiratory acidosis.     - continuous BiPAP  - prednisone 40 mg qd x5 days  - IV ceftriaxone, de-escalate as able, f/u procal  - f/u respiratory and blood cx  - IV azithro x1 (given continuous BiPAP), orders for transition to 500 mg PO tomorrow placed, total 3 days  - prn VBG  - schedule duonebs  - prn albuterol  - home controller inhaler  - aspiration precautions  - no longer smoking

## 2022-09-11 NOTE — ED NOTES
Robel, RT at bedside applying bipap. Pt tolerating bipap at this time. Pt.'s O2 saturation remains at 100%.

## 2022-09-11 NOTE — ED PROVIDER NOTES
Encounter Date: 9/11/2022       History     Chief Complaint   Patient presents with    Shortness of Breath     Hx COPD,2 breathing treatments @ home without relief. Dating 87% on RA, 92% on duoneb with EMS. +ETOH today     63 yo male arriving by EMS with shortness of breath.    PMH:  COPD on 2L 02, qhs CPAP, Seizures, asthma, HTN, CAD, CHF a fib, lung nodule     Context  Patient states he woke up feeling short of breath.  He has been coughing and producing green sputum.  EMS administered 125 mg of Solu-Medrol and a DuoNeb.  Patient was noted to be saturating in the 80s.    Onset:  Gradual  Duration:  Constant since onset just prior to arrival  Associated Symptoms:  Denies fever, denies chest pain, but does have some tightness in his chest       The history is provided by the patient and medical records. No  was used.   Review of patient's allergies indicates:   Allergen Reactions    Benazepril Swelling     Past Medical History:   Diagnosis Date    Asthma     Atrial fibrillation with rapid ventricular response     CHF (congestive heart failure)     COPD (chronic obstructive pulmonary disease)     home O2 at night only    Coronary artery disease     Hypertension     Lung nodule     Pneumonia     Seizures      Past Surgical History:   Procedure Laterality Date    ESOPHAGOGASTRODUODENOSCOPY N/A 2/11/2022    Procedure: EGD (ESOPHAGOGASTRODUODENOSCOPY);  Surgeon: Cain Ortega MD;  Location: [facility] ENDO ([address] [place] [address]);  Service: Endoscopy;  Laterality: N/A;    LEFT HEART CATHETERIZATION  4/23/2020    Procedure: Left heart cath;  Surgeon: Nic Pollack MD;  Location: [facility] CATH LAB;  Service: Cardiology;;     Family History   Problem Relation Age of Onset    Cancer Father     Hypertension Sister     Stroke Sister     Stroke Brother     Diabetes Neg Hx     Heart disease Neg Hx      Social History     Tobacco Use    Smoking status: Some Days     Packs/day: 0.25     Types: Cigarettes    Smokeless tobacco:  Never    Tobacco comments:     1-2 per day   Substance Use Topics    Alcohol use: Yes     Alcohol/week: 2.0 standard drinks     Types: 2 Cans of beer per week     Comment: every night    Drug use: No     Review of Systems   Constitutional:  Negative for fever.   HENT:  Negative for trouble swallowing.    Respiratory:  Positive for cough, chest tightness, shortness of breath and wheezing.    Cardiovascular:  Negative for chest pain and leg swelling.   Gastrointestinal:  Negative for vomiting.   Musculoskeletal:  Negative for back pain.   Skin:  Negative for rash.   Allergic/Immunologic: Negative for immunocompromised state.   Neurological:  Negative for headaches.   Hematological:  Does not bruise/bleed easily.     Physical Exam     Initial Vitals   BP Pulse Resp Temp SpO2   09/11/22 0300 09/11/22 0300 09/11/22 0300 09/11/22 0301 09/11/22 0300   (!) 139/90 (!) 112 (!) 24 98.5 °F (36.9 °C) (!) 92 %      MAP       --                Physical Exam    Nursing note and vitals reviewed.  Constitutional: He is not diaphoretic. No distress.   HENT:   Head: Normocephalic and atraumatic.   Eyes: Right eye exhibits no discharge. Left eye exhibits no discharge.   Neck: Neck supple. No tracheal deviation present. No JVD present.   Cardiovascular:  Regular rhythm.   Tachycardia present.         Pulmonary/Chest: No stridor. He is in respiratory distress (moderate).   Expiratory wheezing  Diminished air entry to bases  Tachypnea   Abdominal: Abdomen is soft. There is no abdominal tenderness.   Musculoskeletal:         General: No edema.      Cervical back: Neck supple.     Neurological: He is alert and oriented to person, place, and time.   Skin: Skin is warm. No rash noted.   Psychiatric: He has a normal mood and affect. His behavior is normal.       ED Course   Procedures  Labs Reviewed   CBC W/ AUTO DIFFERENTIAL - Abnormal; Notable for the following components:       Result Value    WBC 13.64 (*)     Hemoglobin 13.9 (*)     MCV 78  (*)     MCH 23.9 (*)     MCHC 30.7 (*)     RDW 22.7 (*)     Platelets 501 (*)     Gran # (ANC) 8.4 (*)     Eos # 0.6 (*)     All other components within normal limits   COMPREHENSIVE METABOLIC PANEL - Abnormal; Notable for the following components:    Chloride 92 (*)     CO2 31 (*)     Glucose 58 (*)     ALT 9 (*)     All other components within normal limits   TROPONIN I - Abnormal; Notable for the following components:    Troponin I 0.030 (*)     All other components within normal limits   ISTAT PROCEDURE - Abnormal; Notable for the following components:    POC PH 7.296 (*)     POC PCO2 83.9 (*)     POC HCO3 40.9 (*)     POC SATURATED O2 73 (*)     POC TCO2 43 (*)     All other components within normal limits   POCT GLUCOSE - Abnormal; Notable for the following components:    POCT Glucose 201 (*)     All other components within normal limits   ISTAT PROCEDURE - Abnormal; Notable for the following components:    POC PH 7.259 (*)     POC PCO2 80.5 (*)     POC HCO3 36.0 (*)     POC SATURATED O2 75 (*)     POC TCO2 38 (*)     All other components within normal limits   B-TYPE NATRIURETIC PEPTIDE   SARS-COV-2 RNA AMPLIFICATION, QUAL   PROCALCITONIN   TROPONIN I     EKG Readings: (Independently Interpreted)   Initial Reading: No STEMI. Previous EKG: Compared with most recent EKG Rhythm: Sinus Tachycardia. Heart Rate: 104. Ectopy: No Ectopy. Clinical Impression: Sinus Tachycardia     Imaging Results              X-Ray Chest AP Portable (Final result)  Result time 09/11/22 04:02:42      Final result by Antony Samayoa MD (09/11/22 04:02:42)                   Impression:      No acute findings.    No significant change from prior study.      Electronically signed by: Antony Samayoa MD  Date:    09/11/2022  Time:    04:02               Narrative:    EXAMINATION:  XR CHEST AP PORTABLE    CLINICAL HISTORY:  shortness of breath;    TECHNIQUE:  Single frontal view of the chest was  performed.    COMPARISON:  08/31/2022.    FINDINGS:  Chronic increased markings, similar to prior.    Heart and lungs  appear unchanged when allowing for differences in technique and positioning.                                       Medications   glucose chewable tablet 16 g (has no administration in time range)   glucose chewable tablet 24 g (has no administration in time range)   glucagon (human recombinant) injection 1 mg (has no administration in time range)   dextrose 10% bolus 125 mL (has no administration in time range)   dextrose 10% bolus 250 mL (0 mLs Intravenous Stopped 9/11/22 0436)   sodium chloride 0.9% flush 10 mL (has no administration in time range)   melatonin tablet 6 mg (has no administration in time range)   acetaminophen tablet 650 mg (has no administration in time range)   azithromycin 500 mg in dextrose 5 % 250 mL IVPB (ready to mix system) (has no administration in time range)   azithromycin tablet 500 mg (has no administration in time range)   cefTRIAXone (ROCEPHIN) 1 g/50 mL D5W IVPB (has no administration in time range)   predniSONE tablet 40 mg (has no administration in time range)   albuterol-ipratropium 2.5 mg-0.5 mg/3 mL nebulizer solution 3 mL (has no administration in time range)   albuterol inhaler 1 puff (has no administration in time range)   budesonide-glycopyr-formoterol 160-9-4.8 mcg/actuation HFAA 2 puff (has no administration in time range)   NIFEdipine 24 hr tablet 30 mg (has no administration in time range)   albuterol-ipratropium 2.5 mg-0.5 mg/3 mL nebulizer solution 3 mL (3 mLs Nebulization Given 9/11/22 8195)     Medical Decision Making:   History:   Old Medical Records: I decided to obtain old medical records.  Old Records Summarized: other records.       <> Summary of Records: 2022 ECHO:  · The estimated ejection fraction is 55%.  · Indeterminate left ventricular diastolic function.    Initial Assessment:   62-year-old male arriving by EMS for respiratory  distress.  On arrival, he is tachypneic with poor air entry.  Will initiate BiPAP and continue bronchodilators.   Differential Diagnosis:   Including, but not limited to:    COPD exacerbation  Pneumonia  CHF exacerbation  Hypoxia  PE  Independently Interpreted Test(s):   I have ordered and independently interpreted X-rays - see summary below.       <> Summary of X-Ray Reading(s): No acute infiltrate  I have ordered and independently interpreted EKG Reading(s) - see prior notes  Clinical Tests:   Lab Tests: Reviewed and Ordered  Radiological Study: Ordered and Reviewed  Medical Tests: Reviewed and Ordered  ED Management:  Labs notable for hypoglycemia, resolved after dextrose - patient reports poor p.o. intake today.  Leukocytosis noted, but could be reactive/2/2 stress de-margination.  Hypercapnic respiratory failure, likely secondary to COPD exacerbation.  I considered PE in the differential, the patient proved treatment for COPD.  I discussed the case with Critical Care Medicine, who accepted the patient to the ICU for further management, given his continuous BiPAP requirement.  Patient understands and agrees with the plan for admission.  Other:   I have discussed this case with another health care provider.          Attending Attestation:         Attending Critical Care:   Critical Care Times:   Direct Patient Care (initial evaluation, reassessments, and time considering the case)................................................................15 minutes.   Additional History from reviewing old medical records or taking additional history from the family, EMS, PCP, etc.......................5 minutes.   Ordering, Reviewing, and Interpreting Diagnostic Studies...............................................................................................................4 minutes.    Documentation..................................................................................................................................................................................5 minutes.   Consultation with other Physicians. .................................................................................................................................................6 minutes.   ==============================================================  Total Critical Care Time - exclusive of procedural time: 35 minutes.  ==============================================================  Critical care was necessary to treat or prevent imminent or life-threatening deterioration of the following conditions: COPD exacerbation.   Critical care was time spent personally by me on the following activities: obtaining history from patient or relative, examination of patient, ordering lab, x-rays, and/or EKG, development of treatment plan with patient or relative, ordering and performing treatments and interventions, evaluation of patient's response to treatment, discussions with primary provider, discussion with consultants and re-evaluation of patient's conition.   Critical Care Condition: potentially life-threatening         ED Course as of 09/11/22 0541   Sun Sep 11, 2022   0335 POC PCO2(!): 83.9  Respiratory acidosis  [AB]   0339 WBC(!): 13.64  Leukocytosis  [AB]   0412 Glucose(!): 58  Hypoglycemia  [AB]   0446 POCT Glucose(!): 201  Improved  [AB]   0541 Troponin I(!): 0.030  Lower suspicion for ACS - no chest pain, lower than most recent value, will trend.  [AB]      ED Course User Index  [AB] Dev Nguyen MD             Clinical Impression:   Final diagnoses:  [R06.02] Shortness of breath  [E16.2] Hypoglycemia (Primary)  [J44.1] COPD exacerbation  [J96.02] Acute respiratory failure with hypercapnia      ED Disposition Condition    Admit                 Dev Nguyen MD  09/11/22 0541

## 2022-09-11 NOTE — PLAN OF CARE
Problem: Adult Inpatient Plan of Care  Goal: Plan of Care Review  Outcome: Ongoing, Progressing  Goal: Patient-Specific Goal (Individualized)  Outcome: Ongoing, Progressing  Goal: Absence of Hospital-Acquired Illness or Injury  Outcome: Ongoing, Progressing  Goal: Optimal Comfort and Wellbeing  Outcome: Ongoing, Progressing  Goal: Readiness for Transition of Care  Outcome: Ongoing, Progressing     Problem: Obstructive Sleep Apnea Risk or Actual  Goal: Unobstructed Breathing During Sleep  Outcome: Ongoing, Progressing     Problem: Gas Exchange Impaired  Goal: Optimal Gas Exchange  Outcome: Ongoing, Progressing     No acute events throughout shift.  Assessment and VS per flow sheet, pt progressing towards goals as tolerated, plan of care reviewed with patient, all concerns addressed.

## 2022-09-11 NOTE — SUBJECTIVE & OBJECTIVE
Past Medical History:   Diagnosis Date    Asthma     Atrial fibrillation with rapid ventricular response     CHF (congestive heart failure)     COPD (chronic obstructive pulmonary disease)     home O2 at night only    Coronary artery disease     Hypertension     Lung nodule     Pneumonia     Seizures        Past Surgical History:   Procedure Laterality Date    ESOPHAGOGASTRODUODENOSCOPY N/A 2/11/2022    Procedure: EGD (ESOPHAGOGASTRODUODENOSCOPY);  Surgeon: Cain Ortega MD;  Location: [facility] ENDO ([address] [place] [address]);  Service: Endoscopy;  Laterality: N/A;    LEFT HEART CATHETERIZATION  4/23/2020    Procedure: Left heart cath;  Surgeon: Nic Pollack MD;  Location: [facility] CATH LAB;  Service: Cardiology;;       Review of patient's allergies indicates:   Allergen Reactions    Benazepril Swelling       Family History       Problem Relation (Age of Onset)    Cancer Father    Hypertension Sister    Stroke Sister, Brother          Tobacco Use    Smoking status: Some Days     Packs/day: 0.25     Types: Cigarettes    Smokeless tobacco: Never    Tobacco comments:     1-2 per day   Substance and Sexual Activity    Alcohol use: Yes     Alcohol/week: 2.0 standard drinks     Types: 2 Cans of beer per week     Comment: every night    Drug use: No    Sexual activity: Not Currently      Review of Systems   Constitutional:  Negative for chills and fever.   HENT:  Negative for congestion and sore throat.    Eyes:  Negative for pain and redness.   Respiratory:  Positive for cough and shortness of breath.    Cardiovascular:  Negative for chest pain and leg swelling.   Gastrointestinal:  Negative for abdominal pain, nausea and vomiting.   Genitourinary:  Negative for difficulty urinating and dysuria.   Musculoskeletal:  Negative for back pain and myalgias.   Skin:  Negative for rash and wound.   Neurological:  Negative for dizziness and headaches.   Psychiatric/Behavioral:  Negative for agitation and confusion.    Objective:     Vital Signs  (Most Recent):  Temp: 98.5 °F (36.9 °C) (09/11/22 0301)  Pulse: 96 (09/11/22 0436)  Resp: (!) 22 (09/11/22 0436)  BP: 115/61 (09/11/22 0436)  SpO2: 97 % (09/11/22 0436)   Vital Signs (24h Range):  Temp:  [98.5 °F (36.9 °C)] 98.5 °F (36.9 °C)  Pulse:  [] 96  Resp:  [22-26] 22  SpO2:  [92 %-97 %] 97 %  BP: (115-139)/(57-90) 115/61   Weight: 63.5 kg (140 lb)  Body mass index is 20.67 kg/m².    No intake or output data in the 24 hours ending 09/11/22 0553    Physical Exam  Vitals and nursing note reviewed.   Constitutional:       General: He is not in acute distress.     Appearance: Normal appearance. He is normal weight. He is not toxic-appearing or diaphoretic.   HENT:      Head: Normocephalic and atraumatic.   Eyes:      General: No scleral icterus.        Right eye: No discharge.         Left eye: No discharge.      Extraocular Movements: Extraocular movements intact.      Conjunctiva/sclera: Conjunctivae normal.   Cardiovascular:      Rate and Rhythm: Normal rate and regular rhythm.      Pulses: Normal pulses.   Pulmonary:      Breath sounds: No stridor.      Comments: On BiPAP. Speaking in complete sentences. Slight expiratory wheezing. Decreased breath sounds throughout.   Abdominal:      General: Abdomen is flat. There is no distension.      Palpations: Abdomen is soft.   Musculoskeletal:      Cervical back: Normal range of motion and neck supple.      Right lower leg: No edema.      Left lower leg: No edema.      Comments: Moving all extremities purposefully and independently.    Skin:     General: Skin is warm and dry.      Findings: No bruising or rash.   Neurological:      General: No focal deficit present.      Mental Status: He is alert and oriented to person, place, and time. Mental status is at baseline.   Psychiatric:         Mood and Affect: Mood normal.         Behavior: Behavior normal.       Vents:  Oxygen Concentration (%): 40 (09/11/22 0405)  Lines/Drains/Airways       Peripheral Intravenous  Line  Duration                  Peripheral IV - Single Lumen 09/11/22 0325 18 G Anterior;Distal;Left Forearm <1 day         Peripheral IV - Single Lumen 09/11/22 0325 18 G Right Antecubital <1 day                  Significant Labs:    CBC/Anemia Profile:  Recent Labs   Lab 09/11/22 0320   WBC 13.64*   HGB 13.9*   HCT 45.3   *   MCV 78*   RDW 22.7*        Chemistries:  Recent Labs   Lab 09/11/22 0320      K 4.3   CL 92*   CO2 31*   BUN 12   CREATININE 0.9   CALCIUM 9.2   ALBUMIN 3.5   PROT 7.4   BILITOT 0.3   ALKPHOS 73   ALT 9*   AST 14       ABGs:   Recent Labs   Lab 09/11/22  0459   PH 7.259*   PCO2 80.5*   HCO3 36.0*   POCSATURATED 75*   BE 9     CMP:   Recent Labs   Lab 09/11/22 0320      K 4.3   CL 92*   CO2 31*   GLU 58*   BUN 12   CREATININE 0.9   CALCIUM 9.2   PROT 7.4   ALBUMIN 3.5   BILITOT 0.3   ALKPHOS 73   AST 14   ALT 9*   ANIONGAP 13     Troponin:   Recent Labs   Lab 09/11/22 0320   TROPONINI 0.030*     All pertinent labs within the past 24 hours have been reviewed.    Significant Imaging: I have reviewed all pertinent imaging results/findings within the past 24 hours.

## 2022-09-11 NOTE — H&P
[facility] - Emergency Dept  Critical Care Medicine  History & Physical    Patient Name: Baltazar Mccray  MRN: [id number]  Admission Date: 9/11/2022  Hospital Length of Stay: 0 days  Code Status: Full Code  Attending Physician: Last Anderson*   Primary Care Provider: [facility]   Principal Problem: Acute on chronic respiratory failure with hypoxia and hypercapnia    Subjective:     HPI:  62 y.o. M with COPD on 2-3L O2, nightly BiPAP, hx of pAF (not on AC), ELIZABETH, HFpEF, HTN, lung nodule, hx of seizures (not on antiepileptics), alcohol use, recent admission for COPD exacerbation who presents by EMS for acute SOB starting tonight despite use of inhalers and BiPAP machine. States symptoms initially improved at discharge on steroids but started returning after he finished his 3 day course. Same chronic dry cough with no change in frequency or sputum production, denies fever or chills. Pt called EMS, initial O2 saturation 87% on 2L, received 1 duo neb and 125 mg solumedrol en-route--arrived on 10L NRB and +EtOH. Pt no longer smoking, reports adherence to all medications. He did drink some alcohol today and can't remember if he ate prior.     In ED, patient is afebrile, , /90, RR 24, stating 92% on 10L, transitioned to BiPAP FiO2 40%. WBC 13.64. Hg 13.9 (bl). Plts 501. Cr 0.9 (bl). Bicarb 31. Bg 58-->201 repeat s/p 25g of D10. BNP 45. Trop 0.030. VBG 7.29/83/45/40, repeat VBG after continuous biPAP for 2 hours--> 7.25/80/48/36 with continued respiratory acidosis. Critical care consulted for AHHRF.       Hospital/ICU Course:  No notes on file     Past Medical History:   Diagnosis Date    Asthma     Atrial fibrillation with rapid ventricular response     CHF (congestive heart failure)     COPD (chronic obstructive pulmonary disease)     home O2 at night only    Coronary artery disease     Hypertension     Lung nodule     Pneumonia     Seizures        Past Surgical History:    Procedure Laterality Date    ESOPHAGOGASTRODUODENOSCOPY N/A 2/11/2022    Procedure: EGD (ESOPHAGOGASTRODUODENOSCOPY);  Surgeon: Cain Ortega MD;  Location: [facility] ENDO ([address] [place] [address]);  Service: Endoscopy;  Laterality: N/A;    LEFT HEART CATHETERIZATION  4/23/2020    Procedure: Left heart cath;  Surgeon: Nic Pollack MD;  Location: [facility] CATH LAB;  Service: Cardiology;;       Review of patient's allergies indicates:   Allergen Reactions    Benazepril Swelling       Family History       Problem Relation (Age of Onset)    Cancer Father    Hypertension Sister    Stroke Sister, Brother          Tobacco Use    Smoking status: Some Days     Packs/day: 0.25     Types: Cigarettes    Smokeless tobacco: Never    Tobacco comments:     1-2 per day   Substance and Sexual Activity    Alcohol use: Yes     Alcohol/week: 2.0 standard drinks     Types: 2 Cans of beer per week     Comment: every night    Drug use: No    Sexual activity: Not Currently      Review of Systems   Constitutional:  Negative for chills and fever.   HENT:  Negative for congestion and sore throat.    Eyes:  Negative for pain and redness.   Respiratory:  Positive for cough and shortness of breath.    Cardiovascular:  Negative for chest pain and leg swelling.   Gastrointestinal:  Negative for abdominal pain, nausea and vomiting.   Genitourinary:  Negative for difficulty urinating and dysuria.   Musculoskeletal:  Negative for back pain and myalgias.   Skin:  Negative for rash and wound.   Neurological:  Negative for dizziness and headaches.   Psychiatric/Behavioral:  Negative for agitation and confusion.    Objective:     Vital Signs (Most Recent):  Temp: 98.5 °F (36.9 °C) (09/11/22 0301)  Pulse: 96 (09/11/22 0436)  Resp: (!) 22 (09/11/22 0436)  BP: 115/61 (09/11/22 0436)  SpO2: 97 % (09/11/22 0436)   Vital Signs (24h Range):  Temp:  [98.5 °F (36.9 °C)] 98.5 °F (36.9 °C)  Pulse:  [] 96  Resp:  [22-26] 22  SpO2:  [92 %-97 %] 97 %  BP:  (115-139)/(57-90) 115/61   Weight: 63.5 kg (140 lb)  Body mass index is 20.67 kg/m².    No intake or output data in the 24 hours ending 09/11/22 0537    Physical Exam  Vitals and nursing note reviewed.   Constitutional:       General: He is not in acute distress.     Appearance: Normal appearance. He is normal weight. He is not toxic-appearing or diaphoretic.   HENT:      Head: Normocephalic and atraumatic.   Eyes:      General: No scleral icterus.        Right eye: No discharge.         Left eye: No discharge.      Extraocular Movements: Extraocular movements intact.      Conjunctiva/sclera: Conjunctivae normal.   Cardiovascular:      Rate and Rhythm: Normal rate and regular rhythm.      Pulses: Normal pulses.   Pulmonary:      Breath sounds: No stridor.      Comments: On BiPAP. Speaking in complete sentences. Slight expiratory wheezing. Decreased breath sounds throughout.   Abdominal:      General: Abdomen is flat. There is no distension.      Palpations: Abdomen is soft.   Musculoskeletal:      Cervical back: Normal range of motion and neck supple.      Right lower leg: No edema.      Left lower leg: No edema.      Comments: Moving all extremities purposefully and independently.    Skin:     General: Skin is warm and dry.      Findings: No bruising or rash.   Neurological:      General: No focal deficit present.      Mental Status: He is alert and oriented to person, place, and time. Mental status is at baseline.   Psychiatric:         Mood and Affect: Mood normal.         Behavior: Behavior normal.       Vents:  Oxygen Concentration (%): 40 (09/11/22 0405)  Lines/Drains/Airways       Peripheral Intravenous Line  Duration                  Peripheral IV - Single Lumen 09/11/22 0325 18 G Anterior;Distal;Left Forearm <1 day         Peripheral IV - Single Lumen 09/11/22 0325 18 G Right Antecubital <1 day                  Significant Labs:    CBC/Anemia Profile:  Recent Labs   Lab 09/11/22  0320   WBC 13.64*   HGB  13.9*   HCT 45.3   *   MCV 78*   RDW 22.7*        Chemistries:  Recent Labs   Lab 09/11/22  0320      K 4.3   CL 92*   CO2 31*   BUN 12   CREATININE 0.9   CALCIUM 9.2   ALBUMIN 3.5   PROT 7.4   BILITOT 0.3   ALKPHOS 73   ALT 9*   AST 14       ABGs:   Recent Labs   Lab 09/11/22  0459   PH 7.259*   PCO2 80.5*   HCO3 36.0*   POCSATURATED 75*   BE 9     CMP:   Recent Labs   Lab 09/11/22  0320      K 4.3   CL 92*   CO2 31*   GLU 58*   BUN 12   CREATININE 0.9   CALCIUM 9.2   PROT 7.4   ALBUMIN 3.5   BILITOT 0.3   ALKPHOS 73   AST 14   ALT 9*   ANIONGAP 13     Troponin:   Recent Labs   Lab 09/11/22  0320   TROPONINI 0.030*     All pertinent labs within the past 24 hours have been reviewed.    Significant Imaging: I have reviewed all pertinent imaging results/findings within the past 24 hours.    Assessment/Plan:     Psychiatric  Alcohol use disorder, mild, abuse  Last drink day of admission.     - CIWA q4hr    Pulmonary  * Acute on chronic respiratory failure with hypoxia and hypercapnia  62 y.o. M with COPD on 2-3L O2, nightly BiPAP, hx of pAF (not on AC), ELIZABETH, HFpEF, HTN, lung nodule, hx of seizures (not on antiepileptics), alcohol use, recent admission for COPD exacerbation who presents by EMS for acute SOB.    Patient with Hypercapnic and Hypoxic Respiratory failure which is Acute on chronic.  he is on home oxygen at 2 LPM. Supplemental oxygen was provided and noted- Oxygen Concentration (%):  [40] 40.   Signs/symptoms of respiratory failure include- tachypnea, increased work of breathing and respiratory distress. Contributing diagnoses includes - Aspiration, COPD and Pneumonia Labs and images were reviewed. Patient Has recent ABG, which has been reviewed. Will treat underlying causes and adjust management of respiratory failure as follows:     Admission VBG 7.29/83/45/40, repeat VBG after continuous biPAP for 2 hours--> 7.25/80/48/36 with continued respiratory acidosis.     - continuous BiPAP  -  prednisone 40 mg qd x5 days  - IV ceftriaxone, de-escalate as able, f/u procal  - f/u respiratory and blood cx  - IV azithro x1 (given continuous BiPAP), orders for transition to 500 mg PO tomorrow placed, total 3 days  - prn VBG  - schedule duonebs  - prn albuterol  - home controller inhaler  - aspiration precautions  - no longer smoking    COPD exacerbation  See AHHRF.     Cardiac/Vascular  (HFpEF) heart failure with preserved ejection fraction  Hx of HFpEF. On home lasix 40 qd.   Euvolemic on admission.     - restart home lasix as able  - strict I/O    Atrial fibrillation  EKG sinus tach on admission.   Not on home anticoagulation or rate control.     - telemetry  - start BB if poor rate control  - consider AC after discussion with patient  - HR goal<110  - K>4, Mg>2    Elevated troponin  Suspect T2 NSTEMI 2/2 demand in setting of COPD exacerbation.  EKG sinus tach similar to prior, without noted ischemic changes.     - f/u repeat trop    Essential hypertension  Admission /90.     - continue home nifedipine 30 mg qd       Critical secondary to Patient has a condition that poses threat to life and bodily function: Acute on chronic RF requiring continuous BiPAP.      Critical care was time spent personally by me on the following activities: development of treatment plan with patient or surrogate and bedside caregivers, discussions with consultants, evaluation of patient's response to treatment, examination of patient, ordering and performing treatments and interventions, ordering and review of laboratory studies, ordering and review of radiographic studies, pulse oximetry, re-evaluation of patient's condition. This critical care time did not overlap with that of any other provider or involve time for any procedures.     Jacki Lewis MD  Critical Care Medicine  [facility] - Emergency Dept

## 2022-09-11 NOTE — HPI
62 y.o. M with COPD on 2-3L O2, nightly BiPAP, hx of pAF (not on AC), ELIZABETH, HFpEF, HTN, lung nodule, hx of seizures (not on antiepileptics), alcohol use, recent admission for COPD exacerbation who presents by EMS for acute SOB starting tonight despite use of inhalers and BiPAP machine. States symptoms initially improved at discharge on steroids but started returning after he finished his 3 day course. Same chronic dry cough with no change in frequency or sputum production, denies fever or chills. Pt called EMS, initial O2 saturation 87% on 2L, received 1 duo neb and 125 mg solumedrol en-route--arrived on 10L NRB and +EtOH. Pt no longer smoking, reports adherence to all medications. He did drink some alcohol today and can't remember if he ate prior.     In ED, patient is afebrile, , /90, RR 24, stating 92% on 10L, transitioned to BiPAP FiO2 40%. WBC 13.64. Hg 13.9 (bl). Plts 501. Cr 0.9 (bl). Bicarb 31. Bg 58-->201 repeat s/p 25g of D10. BNP 45. Trop 0.030. VBG 7.29/83/45/40, repeat VBG after continuous biPAP for 2 hours--> 7.25/80/48/36 with continued respiratory acidosis. Critical care consulted for AHHRF.

## 2022-09-12 PROBLEM — J18.9 PNA (PNEUMONIA): Status: ACTIVE | Noted: 2022-09-12

## 2022-09-12 PROBLEM — D72.829 LEUCOCYTOSIS: Status: ACTIVE | Noted: 2022-09-12

## 2022-09-12 PROBLEM — D64.9 ANEMIA: Status: ACTIVE | Noted: 2022-07-22

## 2022-09-12 LAB
ALBUMIN SERPL BCP-MCNC: 3.1 G/DL (ref 3.5–5.2)
ALP SERPL-CCNC: 61 U/L (ref 55–135)
ALT SERPL W/O P-5'-P-CCNC: 8 U/L (ref 10–44)
ANION GAP SERPL CALC-SCNC: 8 MMOL/L (ref 8–16)
AST SERPL-CCNC: 14 U/L (ref 10–40)
BASOPHILS # BLD AUTO: 0.01 K/UL (ref 0–0.2)
BASOPHILS NFR BLD: 0.1 % (ref 0–1.9)
BILIRUB SERPL-MCNC: 0.2 MG/DL (ref 0.1–1)
BUN SERPL-MCNC: 15 MG/DL (ref 6–20)
CALCIUM SERPL-MCNC: 9.5 MG/DL (ref 8.7–10.5)
CHLORIDE SERPL-SCNC: 96 MMOL/L (ref 95–110)
CO2 SERPL-SCNC: 31 MMOL/L (ref 23–29)
CREAT SERPL-MCNC: 0.8 MG/DL (ref 0.5–1.4)
DIFFERENTIAL METHOD: ABNORMAL
EOSINOPHIL # BLD AUTO: 0 K/UL (ref 0–0.5)
EOSINOPHIL NFR BLD: 0 % (ref 0–8)
ERYTHROCYTE [DISTWIDTH] IN BLOOD BY AUTOMATED COUNT: 22.2 % (ref 11.5–14.5)
EST. GFR  (NO RACE VARIABLE): >60 ML/MIN/1.73 M^2
GLUCOSE SERPL-MCNC: 117 MG/DL (ref 70–110)
HCT VFR BLD AUTO: 40.4 % (ref 40–54)
HGB BLD-MCNC: 12.1 G/DL (ref 14–18)
IMM GRANULOCYTES # BLD AUTO: 0.08 K/UL (ref 0–0.04)
IMM GRANULOCYTES NFR BLD AUTO: 0.5 % (ref 0–0.5)
LYMPHOCYTES # BLD AUTO: 1.1 K/UL (ref 1–4.8)
LYMPHOCYTES NFR BLD: 7 % (ref 18–48)
MAGNESIUM SERPL-MCNC: 2.1 MG/DL (ref 1.6–2.6)
MCH RBC QN AUTO: 23.2 PG (ref 27–31)
MCHC RBC AUTO-ENTMCNC: 30 G/DL (ref 32–36)
MCV RBC AUTO: 78 FL (ref 82–98)
MONOCYTES # BLD AUTO: 1.2 K/UL (ref 0.3–1)
MONOCYTES NFR BLD: 7.5 % (ref 4–15)
NEUTROPHILS # BLD AUTO: 13 K/UL (ref 1.8–7.7)
NEUTROPHILS NFR BLD: 84.9 % (ref 38–73)
NRBC BLD-RTO: 0 /100 WBC
PHOSPHATE SERPL-MCNC: 2.7 MG/DL (ref 2.7–4.5)
PLATELET # BLD AUTO: 401 K/UL (ref 150–450)
PMV BLD AUTO: 9.8 FL (ref 9.2–12.9)
POCT GLUCOSE: 187 MG/DL (ref 70–110)
POTASSIUM SERPL-SCNC: 4.3 MMOL/L (ref 3.5–5.1)
PROT SERPL-MCNC: 6.6 G/DL (ref 6–8.4)
RBC # BLD AUTO: 5.21 M/UL (ref 4.6–6.2)
SODIUM SERPL-SCNC: 135 MMOL/L (ref 136–145)
WBC # BLD AUTO: 15.35 K/UL (ref 3.9–12.7)

## 2022-09-12 PROCEDURE — 25000242 PHARM REV CODE 250 ALT 637 W/ HCPCS: Performed by: INTERNAL MEDICINE

## 2022-09-12 PROCEDURE — 63700000 PHARM REV CODE 250 ALT 637 W/O HCPCS: Performed by: STUDENT IN AN ORGANIZED HEALTH CARE EDUCATION/TRAINING PROGRAM

## 2022-09-12 PROCEDURE — 99900035 HC TECH TIME PER 15 MIN (STAT)

## 2022-09-12 PROCEDURE — 99233 PR SUBSEQUENT HOSPITAL CARE,LEVL III: ICD-10-PCS | Mod: GC,,, | Performed by: INTERNAL MEDICINE

## 2022-09-12 PROCEDURE — 25000003 PHARM REV CODE 250: Performed by: STUDENT IN AN ORGANIZED HEALTH CARE EDUCATION/TRAINING PROGRAM

## 2022-09-12 PROCEDURE — 83735 ASSAY OF MAGNESIUM: CPT | Performed by: STUDENT IN AN ORGANIZED HEALTH CARE EDUCATION/TRAINING PROGRAM

## 2022-09-12 PROCEDURE — 94640 AIRWAY INHALATION TREATMENT: CPT

## 2022-09-12 PROCEDURE — 25000003 PHARM REV CODE 250

## 2022-09-12 PROCEDURE — 84100 ASSAY OF PHOSPHORUS: CPT | Performed by: STUDENT IN AN ORGANIZED HEALTH CARE EDUCATION/TRAINING PROGRAM

## 2022-09-12 PROCEDURE — 25000242 PHARM REV CODE 250 ALT 637 W/ HCPCS

## 2022-09-12 PROCEDURE — 12000002 HC ACUTE/MED SURGE SEMI-PRIVATE ROOM

## 2022-09-12 PROCEDURE — 94761 N-INVAS EAR/PLS OXIMETRY MLT: CPT

## 2022-09-12 PROCEDURE — 85025 COMPLETE CBC W/AUTO DIFF WBC: CPT | Performed by: STUDENT IN AN ORGANIZED HEALTH CARE EDUCATION/TRAINING PROGRAM

## 2022-09-12 PROCEDURE — S4991 NICOTINE PATCH NONLEGEND: HCPCS

## 2022-09-12 PROCEDURE — 63600175 PHARM REV CODE 636 W HCPCS: Performed by: STUDENT IN AN ORGANIZED HEALTH CARE EDUCATION/TRAINING PROGRAM

## 2022-09-12 PROCEDURE — 99233 SBSQ HOSP IP/OBS HIGH 50: CPT | Mod: GC,,, | Performed by: INTERNAL MEDICINE

## 2022-09-12 PROCEDURE — 63600175 PHARM REV CODE 636 W HCPCS: Performed by: INTERNAL MEDICINE

## 2022-09-12 PROCEDURE — 27000221 HC OXYGEN, UP TO 24 HOURS

## 2022-09-12 PROCEDURE — 80053 COMPREHEN METABOLIC PANEL: CPT | Performed by: STUDENT IN AN ORGANIZED HEALTH CARE EDUCATION/TRAINING PROGRAM

## 2022-09-12 PROCEDURE — 94660 CPAP INITIATION&MGMT: CPT

## 2022-09-12 PROCEDURE — 63600175 PHARM REV CODE 636 W HCPCS

## 2022-09-12 RX ORDER — PREDNISONE 20 MG/1
40 TABLET ORAL DAILY
Status: DISCONTINUED | OUTPATIENT
Start: 2022-09-13 | End: 2022-09-13 | Stop reason: HOSPADM

## 2022-09-12 RX ORDER — ENOXAPARIN SODIUM 100 MG/ML
40 INJECTION SUBCUTANEOUS EVERY 24 HOURS
Status: DISCONTINUED | OUTPATIENT
Start: 2022-09-12 | End: 2022-09-13 | Stop reason: HOSPADM

## 2022-09-12 RX ORDER — NICOTINE 7MG/24HR
1 PATCH, TRANSDERMAL 24 HOURS TRANSDERMAL DAILY
Status: DISCONTINUED | OUTPATIENT
Start: 2022-09-12 | End: 2022-09-13 | Stop reason: HOSPADM

## 2022-09-12 RX ADMIN — IPRATROPIUM BROMIDE AND ALBUTEROL SULFATE 3 ML: 2.5; .5 SOLUTION RESPIRATORY (INHALATION) at 04:09

## 2022-09-12 RX ADMIN — ENOXAPARIN SODIUM 40 MG: 100 INJECTION SUBCUTANEOUS at 05:09

## 2022-09-12 RX ADMIN — PREDNISONE 40 MG: 20 TABLET ORAL at 08:09

## 2022-09-12 RX ADMIN — IPRATROPIUM BROMIDE AND ALBUTEROL SULFATE 3 ML: 2.5; .5 SOLUTION RESPIRATORY (INHALATION) at 08:09

## 2022-09-12 RX ADMIN — FLUTICASONE FUROATE AND VILANTEROL TRIFENATATE 1 PUFF: 200; 25 POWDER RESPIRATORY (INHALATION) at 08:09

## 2022-09-12 RX ADMIN — IPRATROPIUM BROMIDE AND ALBUTEROL SULFATE 3 ML: 2.5; .5 SOLUTION RESPIRATORY (INHALATION) at 12:09

## 2022-09-12 RX ADMIN — ACETAMINOPHEN 650 MG: 325 TABLET ORAL at 10:09

## 2022-09-12 RX ADMIN — NIFEDIPINE 30 MG: 30 TABLET, FILM COATED, EXTENDED RELEASE ORAL at 08:09

## 2022-09-12 RX ADMIN — TIOTROPIUM BROMIDE INHALATION SPRAY 2 PUFF: 3.12 SPRAY, METERED RESPIRATORY (INHALATION) at 08:09

## 2022-09-12 RX ADMIN — IPRATROPIUM BROMIDE AND ALBUTEROL SULFATE 3 ML: 2.5; .5 SOLUTION RESPIRATORY (INHALATION) at 11:09

## 2022-09-12 RX ADMIN — ACETAMINOPHEN 650 MG: 325 TABLET ORAL at 08:09

## 2022-09-12 RX ADMIN — NICOTINE 1 PATCH: 7 PATCH, EXTENDED RELEASE TRANSDERMAL at 02:09

## 2022-09-12 RX ADMIN — AZITHROMYCIN MONOHYDRATE 500 MG: 250 TABLET ORAL at 08:09

## 2022-09-12 RX ADMIN — CEFTRIAXONE 1 G: 1 INJECTION, SOLUTION INTRAVENOUS at 06:09

## 2022-09-12 NOTE — HOSPITAL COURSE
Interval History/Significant Events: Patient states that feel somewhat better than yesterday,tolerated BiPAP through the night. Started on enoxaparin today. Patient stable and will be stepped down to hospital medicine.     9/12 hx of COPD on 2 l nc at home and bipap at home  and pmhx of Afib not on anticoagulation , ELIZABETH,  HFpEF HTN, alcohol use and past hx of seizures (not on antiepilectics) . Admitted for acute hypoxemic respiratory failure secondary to copd exacerbation and concomitant  pneumonia. In the ICU, on BiPAP 4 hrs on and 4 off initially and now continious bipap through the night .   tolerated Bipap overnight. continue  prednisone 40mg x5 days for COPD exacerbation . on  IV ceftriaxone and Azithromycin 500 x 3days for   pneumonia  9/13 Transfer to hospital medicine . ordered bentyl x 1 for stomach cramps . leucocytosis of 15 .sats 97% on 2LNC. continue with Bipap qHs . has second exposure to smoking with brother. continue Nicotine patch. OK to discharge home with vantin, prednisone and azithromcyin

## 2022-09-12 NOTE — H&P
[facility] - Cardiac Medical ICU  Critical Care Medicine  History & Physical    Patient Name: Baltazar Mccray  MRN: [id number]  Admission Date: 9/11/2022  Hospital Length of Stay: 1 days  Code Status: Full Code  Attending Physician: Man Ahumada MD   Primary Care Provider: [facility]   Principal Problem: Acute on chronic respiratory failure with hypoxia and hypercapnia    Subjective:     HPI:  62 y.o. M with COPD on 2-3L O2, nightly BiPAP, hx of pAF (not on AC), ELIZABETH, HFpEF, HTN, lung nodule, hx of seizures (not on antiepileptics), alcohol use, recent admission for COPD exacerbation who presents by EMS for acute SOB starting tonight despite use of inhalers and BiPAP machine. States symptoms initially improved at discharge on steroids but started returning after he finished his 3 day course. Same chronic dry cough with no change in frequency or sputum production, denies fever or chills. Pt called EMS, initial O2 saturation 87% on 2L, received 1 duo neb and 125 mg solumedrol en-route--arrived on 10L NRB and +EtOH. Pt no longer smoking, reports adherence to all medications. He did drink some alcohol today and can't remember if he ate prior.     In ED, patient is afebrile, , /90, RR 24, stating 92% on 10L, transitioned to BiPAP FiO2 40%. WBC 13.64. Hg 13.9 (bl). Plts 501. Cr 0.9 (bl). Bicarb 31. Bg 58-->201 repeat s/p 25g of D10. BNP 45. Trop 0.030. VBG 7.29/83/45/40, repeat VBG after continuous biPAP for 2 hours--> 7.25/80/48/36 with continued respiratory acidosis. Critical care consulted for AHHRF.       Hospital/ICU Course:  No notes on file     Interval History/Significant Events: Patient states that feel somewhat better than yesterday,tolerated BiPAP through the night. Started on enoxaparin today. Patient stable and will be stepped down to hospital medicine.    Review of Systems   Constitutional:  Negative for chills and fever.   HENT:  Negative for congestion and sore throat.    Eyes:   Negative for pain and redness.   Respiratory:  Positive for cough and shortness of breath.    Cardiovascular:  Negative for chest pain and leg swelling.   Gastrointestinal:  Negative for abdominal pain, constipation, nausea and vomiting.   Genitourinary:  Negative for difficulty urinating and dysuria.   Musculoskeletal:  Negative for back pain and myalgias.   Skin:  Negative for rash and wound.   Neurological:  Negative for dizziness, speech difficulty and headaches.   Psychiatric/Behavioral:  Negative for agitation and confusion.    Objective:     Vital Signs (Most Recent):  Temp: 97.8 °F (36.6 °C) (09/12/22 0700)  Pulse: 75 (09/12/22 1218)  Resp: (!) 22 (09/12/22 1218)  BP: (!) 147/82 (09/12/22 1205)  SpO2: 95 % (09/12/22 1218)   Vital Signs (24h Range):  Temp:  [97.4 °F (36.3 °C)-98 °F (36.7 °C)] 97.8 °F (36.6 °C)  Pulse:  [] 75  Resp:  [18-30] 22  SpO2:  [89 %-99 %] 95 %  BP: (120-171)/(60-97) 147/82   Weight: 63.5 kg (139 lb 15.9 oz)  Body mass index is 20.67 kg/m².      Intake/Output Summary (Last 24 hours) at 9/12/2022 1250  Last data filed at 9/12/2022 1000  Gross per 24 hour   Intake 1329.12 ml   Output 1700 ml   Net -370.88 ml       Physical Exam  Vitals and nursing note reviewed.   Constitutional:       General: He is not in acute distress.     Appearance: Normal appearance. He is normal weight. He is not toxic-appearing or diaphoretic.   HENT:      Head: Normocephalic and atraumatic.      Mouth/Throat:      Mouth: Mucous membranes are dry.   Eyes:      General: No scleral icterus.        Right eye: No discharge.         Left eye: No discharge.      Extraocular Movements: Extraocular movements intact.      Conjunctiva/sclera: Conjunctivae normal.   Cardiovascular:      Rate and Rhythm: Normal rate and regular rhythm.      Pulses: Normal pulses.      Heart sounds: Normal heart sounds.   Pulmonary:      Breath sounds: No stridor.      Comments: Speaking in complete sentences. Slight expiratory  wheezing. Bilateral basilar crackles predominantly on the right lung.  Abdominal:      General: Abdomen is flat. There is no distension.      Palpations: Abdomen is soft.      Tenderness: There is no abdominal tenderness.   Musculoskeletal:      Cervical back: Normal range of motion and neck supple.      Right lower leg: No edema.      Left lower leg: No edema.      Comments: Moving all extremities purposefully and independently.    Skin:     General: Skin is warm and dry.      Findings: No bruising or rash.   Neurological:      General: No focal deficit present.      Mental Status: He is alert and oriented to person, place, and time. Mental status is at baseline.   Psychiatric:         Mood and Affect: Mood normal.         Behavior: Behavior normal.       Vents:  Oxygen Concentration (%): 30 (09/12/22 0500)  Lines/Drains/Airways       Peripheral Intravenous Line  Duration                  Peripheral IV - Single Lumen 09/11/22 0325 18 G Anterior;Distal;Left Forearm 1 day         Peripheral IV - Single Lumen 09/11/22 0325 18 G Right Antecubital 1 day                  Significant Labs:    CBC/Anemia Profile:  Recent Labs   Lab 09/11/22 0320 09/12/22 0422   WBC 13.64* 15.35*   HGB 13.9* 12.1*   HCT 45.3 40.4   * 401   MCV 78* 78*   RDW 22.7* 22.2*        Chemistries:  Recent Labs   Lab 09/11/22 0320 09/12/22 0422    135*   K 4.3 4.3   CL 92* 96   CO2 31* 31*   BUN 12 15   CREATININE 0.9 0.8   CALCIUM 9.2 9.5   ALBUMIN 3.5 3.1*   PROT 7.4 6.6   BILITOT 0.3 0.2   ALKPHOS 73 61   ALT 9* 8*   AST 14 14   MG  --  2.1   PHOS  --  2.7       Recent Lab Results         09/12/22 0422 09/11/22 2033        Albumin 3.1         Alkaline Phosphatase 61         ALT 8         Anion Gap 8         AST 14         Baso # 0.01         Basophil % 0.1         BILIRUBIN TOTAL 0.2  Comment: For infants and newborns, interpretation of results should be based  on gestational age, weight and in agreement with  clinical  observations.    Premature Infant recommended reference ranges:  Up to 24 hours.............<8.0 mg/dL  Up to 48 hours............<12.0 mg/dL  3-5 days..................<15.0 mg/dL  6-29 days.................<15.0 mg/dL           BUN 15         Calcium 9.5         Chloride 96         CO2 31         Creatinine 0.8         Differential Method Automated         eGFR >60.0         Eos # 0.0         Eosinophil % 0.0         Glucose 117         Gram Stain (Respiratory)   >10epis/lfp and <than many WBC's          Predominance of oropharyngeal mara. Please recollect.       Gran # (ANC) 13.0         Gran % 84.9         Hematocrit 40.4         Hemoglobin 12.1         Immature Grans (Abs) 0.08  Comment: Mild elevation in immature granulocytes is non specific and   can be seen in a variety of conditions including stress response,   acute inflammation, trauma and pregnancy. Correlation with other   laboratory and clinical findings is essential.           Immature Granulocytes 0.5         Lymph # 1.1         Lymph % 7.0         Magnesium 2.1         MCH 23.2         MCHC 30.0         MCV 78         Mono # 1.2         Mono % 7.5         MPV 9.8         nRBC 0         Phosphorus 2.7         Platelets 401         Potassium 4.3         PROTEIN TOTAL 6.6         RBC 5.21         RDW 22.2         Respiratory Culture   Specimen inadequate - culture not performed       Sodium 135         WBC 15.35                 Significant Imaging:  I have reviewed all pertinent imaging results/findings within the past 24 hours.    Assessment/Plan:     Psychiatric  Alcohol use disorder, mild, abuse  Last drink day of admission.     - CIWA q4hr    Pulmonary  * Acute on chronic respiratory failure with hypoxia and hypercapnia  62 y.o. M with COPD on 2-3L O2, nightly BiPAP, hx of pAF (not on AC), ELIZABETH, HFpEF, HTN, lung nodule, hx of seizures (not on antiepileptics), alcohol use, recent admission for COPD exacerbation who presents by EMS for  acute SOB.    Patient with Hypercapnic and Hypoxic Respiratory failure which is Acute on chronic.  he is on home oxygen at 2 LPM. Supplemental oxygen was provided and noted- Oxygen Concentration (%):  [30] 30.   Signs/symptoms of respiratory failure include- tachypnea, increased work of breathing and respiratory distress. Contributing diagnoses includes - Aspiration, COPD and Pneumonia Labs and images were reviewed. Patient Has recent ABG, which has been reviewed. Will treat underlying causes and adjust management of respiratory failure as follows:     Admission VBG 7.29/83/45/40, repeat VBG after continuous biPAP for 2 hours--> 7.25/80/48/36 with continued respiratory acidosis.     -  BiPAP  - prednisone 40 mg qd x5 days  -  prn VBG  - scheduled duonebs  - prn albuterol  - sporadiclly  Smoking  -Nicotine patch 9/12    PNA (pneumonia)  Switched IV azitromycin to oral 500mg x 3    COPD exacerbation  See AHHRF.     Cardiac/Vascular  (HFpEF) heart failure with preserved ejection fraction  Hx of HFpEF. On home lasix 40 qd.   Euvolemic on admission.     - restart home lasix as able  - strict I/O    Atrial fibrillation  EKG sinus tach on admission.   Not on home anticoagulation or rate control.     - telemetry  - start BB if poor rate control  - consider AC after discussion with patient  - HR goal<110  - K>4, Mg>2    Elevated troponin  Suspect T2 NSTEMI 2/2 demand in setting of COPD exacerbation.  EKG sinus tach similar to prior, without noted ischemic changes.     -  -minimum change in repeat trop 9/11 0.039.      Essential hypertension   /76 this am.         171/77 noon     - continue home nifedipine 30 mg qd          Critical Care Daily Checklist:    A: Awake: RASS Goal/Actual Goal:    Actual: Rosa Agitation Sedation Scale (RASS): Alert and calm   B: Spontaneous Breathing Trial Performed?     C: SAT & SBT Coordinated?  na                      D: Delirium: CAM-ICU Overall CAM-ICU: Negative   E: Early Mobility  Performed? Yes   F: Feeding Goal:    Status:     Current Diet Order   Procedures    Diet Cardiac      AS: Analgesia/Sedation na   T: Thromboembolic Prophylaxis lovenox   H: HOB > 300 Yes   U: Stress Ulcer Prophylaxis (if needed) na   G: Glucose Control yes   B: Bowel Function Stool Occurrence: 1   I: Indwelling Catheter (Lines & Lucas) Necessity no   D: De-escalation of Antimicrobials/Pharmacotherapies Yes to oral azitromycin    Plan for the day/ETD Step down to hospital medicine    Code Status:  Family/Goals of Care: Full Code  na       Critical secondary to Patient has a condition that poses threat to life and bodily function: Severe Respiratory Distress     Critical care was time spent personally by me on the following activities: development of treatment plan with patient or surrogate and bedside caregivers, discussions with consultants, evaluation of patient's response to treatment, examination of patient, ordering and performing treatments and interventions, ordering and review of laboratory studies, ordering and review of radiographic studies, pulse oximetry, re-evaluation of patient's condition. This critical care time did not overlap with that of any other provider or involve time for any procedures.     Renny Nash MD  Critical Care Medicine  [facility] - Cardiac Medical ICU

## 2022-09-12 NOTE — PLAN OF CARE
[facility] - Cardiac Medical ICU  Initial Discharge Assessment       Primary Care Provider: [facility]    Admission Diagnosis: Shortness of breath [R06.02]  Hypoglycemia [E16.2]  Elevated troponin [R77.8]  COPD exacerbation [J44.1]  Acute respiratory failure with hypercapnia [J96.02]  Acute hypoxemic respiratory failure [J96.01]    Admission Date: 9/11/2022  Expected Discharge Date: 9/15/2022    Discharge Barriers Identified: None    Payor: HUMANA MANAGED MEDICARE / Plan: HUMANA MEDICARE [facility] / Product Type: Medicare Advantage /     Extended Emergency Contact Information  Primary Emergency Contact: Gladis Mccray   [place]  Mobile Phone: [phone]  Relation: Sister  Secondary Emergency Contact: Viry Mccray   [place]  Mobile Phone: [phone]  Relation: Sister    Discharge Plan A: Home  Discharge Plan B: Home Health      [facility]  [address]  [place] [address]  Phone: [phone] Fax: [phone]    [id number] - [place], [place] - [facility] [address] AT [facility] [place] [facility]  [address]  [facility] [address]  Phone: [phone] Fax: [phone]      Transferred from:     Past Medical History:   Diagnosis Date    Asthma     Atrial fibrillation with rapid ventricular response     CHF (congestive heart failure)     COPD (chronic obstructive pulmonary disease)     home O2 at night only    Coronary artery disease     Hypertension     Lung nodule     Pneumonia     Seizures          CM met with patient in room for Discharge Planning Assessment.  Patient was able to answer questions.  Per patient, he lives alone in a 1st floor apartment with 0 step(s) to enter.   Per patient, he was independent with ADLS and used no DME for ambulation. Per patient, he is not on dialysis and does not take Coumadin.  Patient will have help from Gladis Mccray (sister) [phone], Viry Mccray (sister)  [phone] upon discharge.   Discharge Planning Booklet given to patient/family and discussed.  All questions addressed.  CM will follow for needs.      Initial Assessment (most recent)       Adult Discharge Assessment - 09/12/22 1217          Discharge Assessment    Assessment Type Discharge Planning Assessment     Confirmed/corrected address, phone number and insurance Yes     Confirmed Demographics Correct on Facesheet     Source of Information patient     When was your last doctors appointment? 07/07/22     Communicated RUTH with patient/caregiver Date not available/Unable to determine     Reason For Admission Acute on chronic respiratory failure with hypoxia and hypercapnia     Lives With alone     Facility Arrived From: Home     Do you expect to return to your current living situation? Yes     Do you have help at home or someone to help you manage your care at home? No     Prior to hospitilization cognitive status: Alert/Oriented     Current cognitive status: Alert/Oriented     Walking or Climbing Stairs Difficulty none     Dressing/Bathing Difficulty none     Equipment Currently Used at Home none     Readmission within 30 days? Yes     Patient currently being followed by outpatient case management? --   patient has been referred    Do you currently have service(s) that help you manage your care at home? Yes     Name and Contact number of agency [facility]     Is the pt/caregiver preference to resume services with current agency Yes     Do you take prescription medications? Yes     Do you have prescription coverage? Yes     Coverage HUMANA MANAGED MEDICARE - HUMANA MEDICARE [facility]     Do you have any problems affording any of your prescribed medications? TBD     Is the patient taking medications as prescribed? yes     Who is going to help you get home at discharge? Gladis Mccray (sister) [phone], Viry Mccray (sister) [phone]     Are you on dialysis? No     Do you take coumadin? No      Discharge Plan A Home     Discharge Plan B Home Health     DME Needed Upon Discharge  other (see comments)   TBD    Discharge Plan discussed with: Patient     Discharge Barriers Identified None                            PCP:  Rhonda Amarilys Montalvo  [phone]        Pharmacy:    [facility]  [address]  [facility] [address]  Phone: [phone] Fax: [phone]    [id number] - [place], [place] - [facility] [facility] [address]  [facility] [place] [address]  Phone: [phone] Fax: [phone]        Emergency Contacts:  Extended Emergency Contact Information  Primary Emergency Contact: Gladis Mccray   [place]  Mobile Phone: [phone]  Relation: Sister  Secondary Emergency Contact: Viry Mccray   [place]  Mobile Phone: [phone]  Relation: Sister      Insurance:    Payor: HUMANA MANAGED MEDICARE / Plan: HUMANA MEDICARE [facility] / Product Type: Medicare Advantage /     Dilcia Sharma RN     [phone]      09/12/2022  12:31 PM

## 2022-09-12 NOTE — ASSESSMENT & PLAN NOTE
Suspect T2 NSTEMI 2/2 demand in setting of COPD exacerbation.  EKG sinus tach similar to prior, without noted ischemic changes. =  -minimum change in repeat trop 9/11 0.039.     serial troponins.  Cardiac Enzymes trend  Recent Labs   Lab 09/11/22  0320 09/11/22  0550 09/11/22  0848   TROPONINI 0.030* 0.034* 0.039*

## 2022-09-12 NOTE — SUBJECTIVE & OBJECTIVE
Interval History/Significant Events: Patient states that feel somewhat better than yesterday,tolerated BiPAP through the night. Started on enoxaparin today. Patient stable and will be stepped down to hospital medicine.    Review of Systems   Constitutional:  Negative for chills and fever.   HENT:  Negative for congestion and sore throat.    Eyes:  Negative for pain and redness.   Respiratory:  Positive for cough and shortness of breath.    Cardiovascular:  Negative for chest pain and leg swelling.   Gastrointestinal:  Negative for abdominal pain, constipation, nausea and vomiting.   Genitourinary:  Negative for difficulty urinating and dysuria.   Musculoskeletal:  Negative for back pain and myalgias.   Skin:  Negative for rash and wound.   Neurological:  Negative for dizziness, speech difficulty and headaches.   Psychiatric/Behavioral:  Negative for agitation and confusion.    Objective:     Vital Signs (Most Recent):  Temp: 97.8 °F (36.6 °C) (09/12/22 0700)  Pulse: 75 (09/12/22 1218)  Resp: (!) 22 (09/12/22 1218)  BP: (!) 147/82 (09/12/22 1205)  SpO2: 95 % (09/12/22 1218)   Vital Signs (24h Range):  Temp:  [97.4 °F (36.3 °C)-98 °F (36.7 °C)] 97.8 °F (36.6 °C)  Pulse:  [] 75  Resp:  [18-30] 22  SpO2:  [89 %-99 %] 95 %  BP: (120-171)/(60-97) 147/82   Weight: 63.5 kg (139 lb 15.9 oz)  Body mass index is 20.67 kg/m².      Intake/Output Summary (Last 24 hours) at 9/12/2022 1250  Last data filed at 9/12/2022 1000  Gross per 24 hour   Intake 1329.12 ml   Output 1700 ml   Net -370.88 ml       Physical Exam  Vitals and nursing note reviewed.   Constitutional:       General: He is not in acute distress.     Appearance: Normal appearance. He is normal weight. He is not toxic-appearing or diaphoretic.   HENT:      Head: Normocephalic and atraumatic.      Mouth/Throat:      Mouth: Mucous membranes are dry.   Eyes:      General: No scleral icterus.        Right eye: No discharge.         Left eye: No discharge.       Extraocular Movements: Extraocular movements intact.      Conjunctiva/sclera: Conjunctivae normal.   Cardiovascular:      Rate and Rhythm: Normal rate and regular rhythm.      Pulses: Normal pulses.      Heart sounds: Normal heart sounds.   Pulmonary:      Breath sounds: No stridor.      Comments: Speaking in complete sentences. Slight expiratory wheezing. Bilateral basilar crackles predominantly on the right lung.  Abdominal:      General: Abdomen is flat. There is no distension.      Palpations: Abdomen is soft.      Tenderness: There is no abdominal tenderness.   Musculoskeletal:      Cervical back: Normal range of motion and neck supple.      Right lower leg: No edema.      Left lower leg: No edema.      Comments: Moving all extremities purposefully and independently.    Skin:     General: Skin is warm and dry.      Findings: No bruising or rash.   Neurological:      General: No focal deficit present.      Mental Status: He is alert and oriented to person, place, and time. Mental status is at baseline.   Psychiatric:         Mood and Affect: Mood normal.         Behavior: Behavior normal.       Vents:  Oxygen Concentration (%): 30 (09/12/22 0500)  Lines/Drains/Airways       Peripheral Intravenous Line  Duration                  Peripheral IV - Single Lumen 09/11/22 0325 18 G Anterior;Distal;Left Forearm 1 day         Peripheral IV - Single Lumen 09/11/22 0325 18 G Right Antecubital 1 day                  Significant Labs:    CBC/Anemia Profile:  Recent Labs   Lab 09/11/22 0320 09/12/22  0422   WBC 13.64* 15.35*   HGB 13.9* 12.1*   HCT 45.3 40.4   * 401   MCV 78* 78*   RDW 22.7* 22.2*        Chemistries:  Recent Labs   Lab 09/11/22 0320 09/12/22  0422    135*   K 4.3 4.3   CL 92* 96   CO2 31* 31*   BUN 12 15   CREATININE 0.9 0.8   CALCIUM 9.2 9.5   ALBUMIN 3.5 3.1*   PROT 7.4 6.6   BILITOT 0.3 0.2   ALKPHOS 73 61   ALT 9* 8*   AST 14 14   MG  --  2.1   PHOS  --  2.7       Recent Lab Results          09/12/22 0422   09/11/22 2033        Albumin 3.1         Alkaline Phosphatase 61         ALT 8         Anion Gap 8         AST 14         Baso # 0.01         Basophil % 0.1         BILIRUBIN TOTAL 0.2  Comment: For infants and newborns, interpretation of results should be based  on gestational age, weight and in agreement with clinical  observations.    Premature Infant recommended reference ranges:  Up to 24 hours.............<8.0 mg/dL  Up to 48 hours............<12.0 mg/dL  3-5 days..................<15.0 mg/dL  6-29 days.................<15.0 mg/dL           BUN 15         Calcium 9.5         Chloride 96         CO2 31         Creatinine 0.8         Differential Method Automated         eGFR >60.0         Eos # 0.0         Eosinophil % 0.0         Glucose 117         Gram Stain (Respiratory)   >10epis/lfp and <than many WBC's          Predominance of oropharyngeal mara. Please recollect.       Gran # (ANC) 13.0         Gran % 84.9         Hematocrit 40.4         Hemoglobin 12.1         Immature Grans (Abs) 0.08  Comment: Mild elevation in immature granulocytes is non specific and   can be seen in a variety of conditions including stress response,   acute inflammation, trauma and pregnancy. Correlation with other   laboratory and clinical findings is essential.           Immature Granulocytes 0.5         Lymph # 1.1         Lymph % 7.0         Magnesium 2.1         MCH 23.2         MCHC 30.0         MCV 78         Mono # 1.2         Mono % 7.5         MPV 9.8         nRBC 0         Phosphorus 2.7         Platelets 401         Potassium 4.3         PROTEIN TOTAL 6.6         RBC 5.21         RDW 22.2         Respiratory Culture   Specimen inadequate - culture not performed       Sodium 135         WBC 15.35                 Significant Imaging:  I have reviewed all pertinent imaging results/findings within the past 24 hours.

## 2022-09-12 NOTE — PLAN OF CARE
CMICU DAILY GOALS       A: Awake    RASS: Goal -    Actual -     Restraint necessity:    B: Breathe   SBT: Not intubated   C: Coordinate A & B, analgesics/sedatives   Pain: managed    SAT: Not intubated  D: Delirium   CAM-ICU: Overall CAM-ICU: Negative  E: Early(intubated/ Progressive (non-intubated) Mobility   MOVE Screen: Pass   Activity: Activity Management: Rolling - L1  FAS: Feeding/Nutrition   Diet order: Diet/Nutrition Received: 2 gram sodium, no added salt, low saturated fat/low cholesterol,    T: Thrombus   DVT prophylaxis: VTE Required Core Measure: Pharmacological prophylaxis initiated/maintained  H: HOB Elevation   Head of Bed (HOB) Positioning: HOB at 20-30 degrees  U: Ulcer Prophylaxis   GI: yes  G: Glucose control   managed    S: Skin   Bathing/Skin Care: bath, complete, electrode patches/site rotation, incontinence care  Device Skin Pressure Protection: absorbent pad utilized/changed, adhesive use limited, positioning supports utilized, pressure points protected  Pressure Reduction Devices: foam padding utilized, heel offloading device utilized, positioning supports utilized, pressure-redistributing mattress utilized  Pressure Reduction Techniques: frequent weight shift encouraged, heels elevated off bed, positioned off wounds, pressure points protected, weight shift assistance provided  Skin Protection: adhesive use limited, incontinence pads utilized, hydrocolloids used, tubing/devices free from skin contact, transparent dressing maintained  B: Bowel Function   no issues   I: Indwelling Catheters   Lucas necessity:     CVC necessity: No  D: De-escalation Antibiotics   No    Family/Goals of care/Code Status   Code Status: Full Code    24H Vital Sign Range  Temp:  [97.4 °F (36.3 °C)-98 °F (36.7 °C)]   Pulse:  [66-94]   Resp:  [18-29]   BP: (123-171)/(60-97)   SpO2:  [89 %-99 %]      Shift Events   No acute events throughout shift. Stepdown orders in place, awaiting bed availability.     VS and  assessment per flow sheet, patient progressing towards goals as tolerated, plan of care reviewed with  patient , all concerns addressed, will continue to monitor.    Evelina Mtz

## 2022-09-12 NOTE — ASSESSMENT & PLAN NOTE
62 y.o. M with COPD on 2-3L O2, nightly BiPAP, hx of pAF (not on AC), ELIZABETH, HFpEF, HTN, lung nodule, hx of seizures (not on antiepileptics), alcohol use, recent admission for COPD exacerbation who presents by EMS for acute SOB.     Patient with Hypercapnic and Hypoxic Respiratory failure which is Acute on chronic.  he is on home oxygen at 2 LPM. Supplemental oxygen was provided and noted- Oxygen Concentration (%):  [30] 30.   Signs/symptoms of respiratory failure include- tachypnea, increased work of breathing and respiratory distress. Contributing diagnoses includes - Aspiration, COPD and Pneumonia Labs and images were reviewed. Patient Has recent ABG, which has been reviewed. Will treat underlying causes and adjust management of respiratory failure as follows:      Admission VBG 7.29/83/45/40, repeat VBG after continuous biPAP for 2 hours--> 7.25/80/48/36 with continued respiratory acidosis.      -  BiPAP  - prednisone 40 mg qd x5 days  -  prn VBG  - scheduled duonebs  - prn albuterol  - sporadiclly  Smoking  -Nicotine patch 9/12 9/12 . hx of COPD on 2 l nc at home and bipap at home  and pmhx of Afib not on anticoagulation , ELIZABETH,  HFpEF HTN, alcohol use and past hx of seizures (not on antiepilectics) . Admitted for acute hypoxemic respiratory failure secondary to copd exacerbation and concomitant  pneumonia. In the ICU, on BiPAP 4 hrs on and 4 off initially and now continious bipap through the night .   tolerated Bipap overnight. continue  prednisone 40mg x5 days for COPD exacerbation . on  IV ceftriaxone and Azithromycin 500 x 3days for   pneumonia  9/13 Transfer to hospital medicine . ordered bentyl x 1 for stomach cramps . leucocytosis of 15 .sats 97% on 2LNC. continue with Bipap qHs   9/13 Transfer to hospital medicine . ordered bentyl x 1 for stomach cramps . leucocytosis of 15 .sats 97% on 2LNC. continue with Bipap qHs . has second exposure to smoking with brother. continue Nicotine patch. OK to discharge  home with vantin, prednisone and azithromcyin . patient to continue oxygen at home and Bipap HS

## 2022-09-12 NOTE — ASSESSMENT & PLAN NOTE
Patient's with microcytic anemia.. Hemoglobin stable. Etiology likely due to Iron deficiency.  Current CBC reviewed-    Recent Labs   Lab 09/11/22  0320 09/12/22  0422 09/13/22  0252   HGB 13.9* 12.1* 12.0*       Monitor CBC and transfuse if H/H drops below 7/21.

## 2022-09-12 NOTE — NURSING
CMICU DAILY GOALS   Shift Events   No acute events throughout shift.  VS and assessment per flow sheet, patient progressing towards goals as tolerated, plan of care reviewed with Baltazar Mccray, all concerns addressed.    A: Awake    RASS: Goal -    Actual -     Restraint necessity:  No  B: Breathe   SBT: Not intubated   C: Coordinate A & B, analgesics/sedatives   Pain: managed    SAT: Not intubated  D: Delirium   CAM-ICU: Overall CAM-ICU: Negative  E: Early(intubated/ Progressive (non-intubated) Mobility   MOVE Screen: Pass   Activity: Activity Management: Rolling - L1  FAS: Feeding/Nutrition   Diet order: Diet/Nutrition Received: 2 gram sodium, no added salt,    T: Thrombus   DVT prophylaxis: VTE Required Core Measure: (SCDs) Sequential compression device initiated/maintained  H: HOB Elevation   Head of Bed (HOB) Positioning: HOB at 20-30 degrees  U: Ulcer Prophylaxis   GI: yes  G: Glucose control   managed    S: Skin      Device Skin Pressure Protection: absorbent pad utilized/changed, adhesive use limited, positioning supports utilized, pressure points protected, skin-to-device areas padded, skin-to-skin areas padded  Pressure Reduction Devices: foam padding utilized, heel offloading device utilized, positioning supports utilized, specialty bed utilized  Pressure Reduction Techniques: frequent weight shift encouraged, heels elevated off bed, pressure points protected, weight shift assistance provided  Skin Protection: adhesive use limited, incontinence pads utilized, protective footwear used, skin-to-device areas padded, skin-to-skin areas padded, transparent dressing maintained, tubing/devices free from skin contact  B: Bowel Function   diarrhea   I: Indwelling Catheters   Lucas necessity:     CVC necessity: No  D: De-escalation Antibiotics   No    Family/Goals of care/Code Status   Code Status: Full Code    24H Vital Sign Range  Temp:  [97.4 °F (36.3 °C)-98 °F (36.7 °C)]   Pulse:  []   Resp:  [19-30]   BP:  (104-162)/(59-97)   SpO2:  [71 %-99 %]

## 2022-09-12 NOTE — ASSESSMENT & PLAN NOTE
EKG sinus tach on admission.   Not on home anticoagulation or rate control.   - telemetry  - start BB if poor rate control  - consider AC after discussion with patient  - HR goal<110  - K>4, Mg>2      9/13  Patient is currently in sinus rhythm.[id number] Score: 1.  Anticoagulation not indicated

## 2022-09-12 NOTE — ASSESSMENT & PLAN NOTE
Patient with leucocytosis   Recent Labs   Lab 09/11/22  0320 09/12/22  0422 09/13/22  0252   WBC 13.64* 15.35* 15.95*     . Afebrile. BCX 2  pending . likely secondary to steroids?   WBC counts uptrending.  Respiratory cultures - Predominance of oropharyngeal mara.

## 2022-09-12 NOTE — ASSESSMENT & PLAN NOTE
/76 this am.         171/77 noon     - continue home nifedipine 30 mg qd     (Non-Medical):    Physical Activity:     Days of Exercise per Week:     Minutes of Exercise per Session:    Stress:     Feeling of Stress :    Social Connections:     Frequency of Communication with Friends and Family:     Frequency of Social Gatherings with Friends and Family:     Attends Anabaptism Services:     Active Member of Clubs or Organizations:     Attends Club or Organization Meetings:     Marital Status:    Intimate Partner Violence:     Fear of Current or Ex-Partner:     Emotionally Abused:     Physically Abused:     Sexually Abused:      No current facility-administered medications for this encounter. No current outpatient medications on file. No Known Allergies    Nursing Notes Reviewed    Physical Exam:  ED Triage Vitals [06/13/21 1835]   Enc Vitals Group      /60      Pulse 63      Resp 21      Temp 98.4 °F (36.9 °C)      Temp src       SpO2 97 %      Weight 180 lb (81.6 kg)      Height 6' 3\" (1.905 m)      Head Circumference       Peak Flow       Pain Score       Pain Loc       Pain Edu? Excl. in [address]? General :Patient is awake alert oriented person place and time no acute distress nontoxic appearing  HEENT: Pupils are equally round and reactive to light extraocular motors are intact conjunctivae clear sclerae white there is no injection no icterus. Nose without any rhinorrhea or epistaxis. Oral mucosa is moist no exudate buccal mucosa shows no ulcerations. Uvula is midline    Neck: Neck is supple full range of motion trachea midline thyroid nonpalpable  Cardiac: Heart regular rate rhythm no murmurs rubs clicks or gallops  Lungs: Lungs are clear to auscultation there is no wheezing rhonchi or rales. There is no use of accessory muscles no nasal flaring identified. Chest wall: There is no tenderness to palpation over the chest wall or over ribs  Abdomen: Abdomen is soft nontender nondistended.  There is no firm or pulsatile masses no rebound rigidity or guarding negative Chavira's negative McBurney, no peritoneal signs  Suprapubic:  there is no tenderness to palpation over the external bladder   Musculoskeletal: 5 out of 5 strength in all 4 extremities full flexion extension abduction and adduction supination pronation of all extremities and all digits. No obvious muscle atrophy is noted. No focal muscle deficits are appreciated. No tenderness palpation. Patient's bilateral legs. Compartments are soft throughout. No palpable cords or visible varicosities. No tenderness palpation. No crepitus in the joints. Including ankle knee and hip. Dorsalis pedis, posterior toes pulse 2+. Bilateral.  Distal sensation is intact. Dermatology: Skin is warm and dry there is no obvious abscesses lacerations or lesions noted  Psych: Mentation is grossly normal cognition is grossly normal. Affect is appropriate  Neuro: Motor intact sensory intact cranial nerves II through XII are intact level of consciousness is normal cerebellar function is normal reflexes are grossly normal. No evidence of incontinence or loss of bowel or bladder no saddle anesthesia noted Lymphatic: There is no submandibular or cervical adenopathy appreciated.         I have reviewed and interpreted all of the currently available lab results from this visit (if applicable):  Results for orders placed or performed during the hospital encounter of 06/13/21   CBC auto diff   Result Value Ref Range    WBC 5.7 4.0 - 10.5 K/CU MM    RBC 4.46 (L) 4.6 - 6.2 M/CU MM    Hemoglobin 13.1 (L) 13.5 - 18.0 GM/DL    Hematocrit 40.0 (L) 42 - 52 %    MCV 89.7 78 - 100 FL    MCH 29.4 27 - 31 PG    MCHC 32.8 32.0 - 36.0 %    RDW 12.0 11.7 - 14.9 %    Platelets 918 183 - 017 K/CU MM    MPV 9.4 7.5 - 11.1 FL    Differential Type AUTOMATED DIFFERENTIAL     Segs Relative 36.9 36 - 66 %    Lymphocytes % 43.8 24 - 44 %    Monocytes % 14.4 (H) 0 - 4 %    Eosinophils % 4.0 (H) 0 - 3 %    Basophils % 0.7 0 - 1 %    Segs Absolute 2.1 K/CU MM Lymphocytes Absolute 2.5 K/CU MM    Monocytes Absolute 0.8 K/CU MM    Eosinophils Absolute 0.2 K/CU MM    Basophils Absolute 0.0 K/CU MM    Nucleated RBC % 0.0 %    Total Nucleated RBC 0.0 K/CU MM    Total Immature Neutrophil 0.01 K/CU MM    Immature Neutrophil % 0.2 0 - 0.43 %   BMP   Result Value Ref Range    Sodium 138 135 - 145 MMOL/L    Potassium 4.2 3.5 - 5.1 MMOL/L    Chloride 103 99 - 110 mMol/L    CO2 26 21 - 32 MMOL/L    Anion Gap 9 4 - 16    BUN 10 6 - 23 MG/DL    CREATININE 0.9 0.9 - 1.3 MG/DL    Glucose 81 70 - 99 MG/DL    Calcium 8.9 8.3 - 10.6 MG/DL    GFR Non-African American >60 >60 mL/min/1.73m2    GFR African American >60 >60 mL/min/1.73m2   CK   Result Value Ref Range    Total  38 - 174 IU/L      Radiographs (if obtained):  [] The following radiograph was interpreted by myself in the absence of a radiologist:   [] Radiologist's Report Reviewed:  No orders to display       EKG (if obtained):   Please See Note of attending physician for EKG interpretation. Chart review shows recent radiograph(s):  No results found. MDM:     Interventions given this visit: No orders of the defined types were placed in this encounter. She has had leg weakness body aches. Ambulating fine. Physical exam is unremarkable. Vital signs are stable here. CBC metabolic panel CK within normal limits    I estimate there is LOW risk for (including but not limited to) OPEN FRACTURE, COMPARTMENT SYNDROME, DEEP VENOUS THROMBOSIS, COMPLETE  TENDON RUPTURE, NECROTIZING FASCIITIS, or ACUTE ARTERIAL INJURY, thus I consider the discharge disposition reasonable. Vania Ann (or their surrogate) and I have discussed the diagnosis and risks, and we agree with discharging home with close follow-up. We also discussed returning to the Emergency Department immediately if new or worsening symptoms occur. We have discussed the symptoms which are most concerning that necessitate immediate return.      I independently managed patient today in the ED    /60   Pulse 63   Temp 98.4 °F (36.9 °C)   Resp 21   Ht 6' 3\" (1.905 m)   Wt 180 lb (81.6 kg)   SpO2 97%   BMI 22.50 kg/m²       Clinical Impression:  1. Body aches        Disposition referral (if applicable):  [facility] Emergency Department  [address] [facility]  [phone]    If symptoms worsen or persist    Disposition medications (if applicable):  New Prescriptions    No medications on file         Comment: Please note this report has been produced using speech recognition software and may contain errors related to that system including errors in grammar, punctuation, and spelling, as well as words and phrases that may be inappropriate. If there are any questions or concerns please feel free to contact the dictating provider for clarification.       Michael Lindsey, [id number]  06/13/21 0056

## 2022-09-12 NOTE — ASSESSMENT & PLAN NOTE
Suspect T2 NSTEMI 2/2 demand in setting of COPD exacerbation.  EKG sinus tach similar to prior, without noted ischemic changes.     -  -minimum change in repeat trop 9/11 0.039.

## 2022-09-12 NOTE — RESIDENT HANDOFF
Handoff     Primary Team: [facility] [place] CRITICAL CARE MEDICINE TEAM 2 Room Number: 6083/6083 A     Patient Name: Baltazar Mccray MRN: [id number]     Date of Birth: [id number] Allergies: Benazepril     Age: 62 y.o. Admit Date: 9/11/2022     Sex: male  BMI: Body mass index is 20.67 kg/m².     Code Status: Full Code        Illness Level (current clinical status): Watcher - No    Reason for Admission: Acute on chronic respiratory failure with hypoxia and hypercapnia    Brief HPI (pertinent PMH and diagnosis or differential diagnosis):   62 y.o. M with COPD on 2-3L O2, nightly BiPAP, hx of pAF (not on AC), ELIZABETH, HFpEF, HTN, lung nodule, hx of seizures (not on antiepileptics), alcohol use, recent admission for COPD exacerbation who presents by EMS for acute SOB starting tonight despite use of inhalers and BiPAP machine.   States symptoms initially improved at discharge on steroids but started returning after he finished his 3 day course. Same chronic dry cough with no change in frequency or sputum production, denies fever or chills.           Hospital Course      61 yo male admitted for acute hypoxemic respiratory failure 2/2 to COPD exacerbation. Placed on  continuous BiPAP. Started on prednisone for COPD exacerbation and azithromycin for PNA. Tolerated BiPAP well through the night. Step down to hospital medicine 9/12.       Tasks (specific, using if-then statements):   -Azytromicin 500 po x 3 days  -Prednisone 40 PO x 5 days  -Duonebs  -Bipap 4 on 4off    Contingency Plan   -Outpatient referral to [facility]      Estimated Discharge Date: 3 days    Discharge Disposition: Home or Self Care    Mentored By:  Man Ahumada MD

## 2022-09-12 NOTE — ASSESSMENT & PLAN NOTE
62 y.o. M with COPD on 2-3L O2, nightly BiPAP, hx of pAF (not on AC), ELIZABETH, HFpEF, HTN, lung nodule, hx of seizures (not on antiepileptics), alcohol use, recent admission for COPD exacerbation who presents by EMS for acute SOB.    Patient with Hypercapnic and Hypoxic Respiratory failure which is Acute on chronic.  he is on home oxygen at 2 LPM. Supplemental oxygen was provided and noted- Oxygen Concentration (%):  [30] 30.   Signs/symptoms of respiratory failure include- tachypnea, increased work of breathing and respiratory distress. Contributing diagnoses includes - Aspiration, COPD and Pneumonia Labs and images were reviewed. Patient Has recent ABG, which has been reviewed. Will treat underlying causes and adjust management of respiratory failure as follows:     Admission VBG 7.29/83/45/40, repeat VBG after continuous biPAP for 2 hours--> 7.25/80/48/36 with continued respiratory acidosis.     -  BiPAP  - prednisone 40 mg qd x5 days  -  prn VBG  - scheduled duonebs  - prn albuterol  - sporadiclly  Smoking  -Nicotine patch 9/12

## 2022-09-12 NOTE — ASSESSMENT & PLAN NOTE
Hx of HFpEF. On home lasix 40 qd.   Euvolemic on admission.      - restart home lasix as able  - strict I/O      Echo    Interpretation Summary  · The left ventricle is normal in size with concentric remodeling and normal systolic function.  · The estimated ejection fraction is 55%.  · Indeterminate left ventricular diastolic function.  · There is abnormal septal wall motion.  · Normal right ventricular size with normal right ventricular systolic function.  · Intermediate central venous pressure (8 mmHg).  · Severe left atrial enlargement.  · An interatrial septal aneurysm is present.

## 2022-09-12 NOTE — ASSESSMENT & PLAN NOTE
Chronic, controlled.  Latest blood pressure and vitals reviewed-   Temp:  [97.4 °F (36.3 °C)-98 °F (36.7 °C)]   Pulse:  []   Resp:  [18-30]   BP: (123-171)/(60-97)   SpO2:  [89 %-99 %] .   Home meds for hypertension were reviewed     PRN meds if BP> 180/110 mm HG    continue home nifedipine 30 mg qd

## 2022-09-13 VITALS
OXYGEN SATURATION: 95 % | SYSTOLIC BLOOD PRESSURE: 153 MMHG | HEART RATE: 71 BPM | HEIGHT: 69 IN | RESPIRATION RATE: 16 BRPM | WEIGHT: 140 LBS | DIASTOLIC BLOOD PRESSURE: 86 MMHG | BODY MASS INDEX: 20.73 KG/M2 | TEMPERATURE: 98 F

## 2022-09-13 LAB
ALBUMIN SERPL BCP-MCNC: 2.9 G/DL (ref 3.5–5.2)
ALP SERPL-CCNC: 58 U/L (ref 55–135)
ALT SERPL W/O P-5'-P-CCNC: 10 U/L (ref 10–44)
ANION GAP SERPL CALC-SCNC: 7 MMOL/L (ref 8–16)
AST SERPL-CCNC: 11 U/L (ref 10–40)
BASOPHILS # BLD AUTO: 0.02 K/UL (ref 0–0.2)
BASOPHILS NFR BLD: 0.1 % (ref 0–1.9)
BILIRUB SERPL-MCNC: 0.2 MG/DL (ref 0.1–1)
BUN SERPL-MCNC: 18 MG/DL (ref 6–20)
CALCIUM SERPL-MCNC: 10.1 MG/DL (ref 8.7–10.5)
CHLORIDE SERPL-SCNC: 97 MMOL/L (ref 95–110)
CO2 SERPL-SCNC: 32 MMOL/L (ref 23–29)
CREAT SERPL-MCNC: 0.7 MG/DL (ref 0.5–1.4)
DIFFERENTIAL METHOD: ABNORMAL
EOSINOPHIL # BLD AUTO: 0 K/UL (ref 0–0.5)
EOSINOPHIL NFR BLD: 0 % (ref 0–8)
ERYTHROCYTE [DISTWIDTH] IN BLOOD BY AUTOMATED COUNT: 22.3 % (ref 11.5–14.5)
EST. GFR  (NO RACE VARIABLE): >60 ML/MIN/1.73 M^2
GLUCOSE SERPL-MCNC: 84 MG/DL (ref 70–110)
HCT VFR BLD AUTO: 38.3 % (ref 40–54)
HGB BLD-MCNC: 12 G/DL (ref 14–18)
IMM GRANULOCYTES # BLD AUTO: 0.07 K/UL (ref 0–0.04)
IMM GRANULOCYTES NFR BLD AUTO: 0.4 % (ref 0–0.5)
LYMPHOCYTES # BLD AUTO: 1.6 K/UL (ref 1–4.8)
LYMPHOCYTES NFR BLD: 10 % (ref 18–48)
MAGNESIUM SERPL-MCNC: 1.9 MG/DL (ref 1.6–2.6)
MCH RBC QN AUTO: 24 PG (ref 27–31)
MCHC RBC AUTO-ENTMCNC: 31.3 G/DL (ref 32–36)
MCV RBC AUTO: 76 FL (ref 82–98)
MONOCYTES # BLD AUTO: 0.8 K/UL (ref 0.3–1)
MONOCYTES NFR BLD: 4.7 % (ref 4–15)
NEUTROPHILS # BLD AUTO: 13.5 K/UL (ref 1.8–7.7)
NEUTROPHILS NFR BLD: 84.8 % (ref 38–73)
NRBC BLD-RTO: 0 /100 WBC
PHOSPHATE SERPL-MCNC: 3.4 MG/DL (ref 2.7–4.5)
PLATELET # BLD AUTO: 390 K/UL (ref 150–450)
PMV BLD AUTO: 10.5 FL (ref 9.2–12.9)
POTASSIUM SERPL-SCNC: 4.1 MMOL/L (ref 3.5–5.1)
PROT SERPL-MCNC: 6.1 G/DL (ref 6–8.4)
RBC # BLD AUTO: 5.01 M/UL (ref 4.6–6.2)
SODIUM SERPL-SCNC: 136 MMOL/L (ref 136–145)
WBC # BLD AUTO: 15.95 K/UL (ref 3.9–12.7)

## 2022-09-13 PROCEDURE — 85025 COMPLETE CBC W/AUTO DIFF WBC: CPT | Performed by: STUDENT IN AN ORGANIZED HEALTH CARE EDUCATION/TRAINING PROGRAM

## 2022-09-13 PROCEDURE — 25000242 PHARM REV CODE 250 ALT 637 W/ HCPCS: Performed by: INTERNAL MEDICINE

## 2022-09-13 PROCEDURE — 83735 ASSAY OF MAGNESIUM: CPT | Performed by: STUDENT IN AN ORGANIZED HEALTH CARE EDUCATION/TRAINING PROGRAM

## 2022-09-13 PROCEDURE — 94660 CPAP INITIATION&MGMT: CPT

## 2022-09-13 PROCEDURE — S4991 NICOTINE PATCH NONLEGEND: HCPCS

## 2022-09-13 PROCEDURE — 63700000 PHARM REV CODE 250 ALT 637 W/O HCPCS: Performed by: STUDENT IN AN ORGANIZED HEALTH CARE EDUCATION/TRAINING PROGRAM

## 2022-09-13 PROCEDURE — 25000003 PHARM REV CODE 250: Performed by: HOSPITALIST

## 2022-09-13 PROCEDURE — 25000003 PHARM REV CODE 250: Performed by: STUDENT IN AN ORGANIZED HEALTH CARE EDUCATION/TRAINING PROGRAM

## 2022-09-13 PROCEDURE — 36415 COLL VENOUS BLD VENIPUNCTURE: CPT | Performed by: STUDENT IN AN ORGANIZED HEALTH CARE EDUCATION/TRAINING PROGRAM

## 2022-09-13 PROCEDURE — 25000003 PHARM REV CODE 250

## 2022-09-13 PROCEDURE — 80053 COMPREHEN METABOLIC PANEL: CPT | Performed by: STUDENT IN AN ORGANIZED HEALTH CARE EDUCATION/TRAINING PROGRAM

## 2022-09-13 PROCEDURE — 1111F DSCHRG MED/CURRENT MED MERGE: CPT | Mod: CPTII,,, | Performed by: HOSPITALIST

## 2022-09-13 PROCEDURE — 27000221 HC OXYGEN, UP TO 24 HOURS

## 2022-09-13 PROCEDURE — 94640 AIRWAY INHALATION TREATMENT: CPT

## 2022-09-13 PROCEDURE — 99239 HOSP IP/OBS DSCHRG MGMT >30: CPT | Mod: ,,, | Performed by: HOSPITALIST

## 2022-09-13 PROCEDURE — 84100 ASSAY OF PHOSPHORUS: CPT | Performed by: STUDENT IN AN ORGANIZED HEALTH CARE EDUCATION/TRAINING PROGRAM

## 2022-09-13 PROCEDURE — 1111F PR DISCHARGE MEDS RECONCILED W/ CURRENT OUTPATIENT MED LIST: ICD-10-PCS | Mod: CPTII,,, | Performed by: HOSPITALIST

## 2022-09-13 PROCEDURE — 99900035 HC TECH TIME PER 15 MIN (STAT)

## 2022-09-13 PROCEDURE — 63600175 PHARM REV CODE 636 W HCPCS

## 2022-09-13 PROCEDURE — 94761 N-INVAS EAR/PLS OXIMETRY MLT: CPT

## 2022-09-13 PROCEDURE — 99239 PR HOSPITAL DISCHARGE DAY,>30 MIN: ICD-10-PCS | Mod: ,,, | Performed by: HOSPITALIST

## 2022-09-13 RX ORDER — LANOLIN ALCOHOL/MO/W.PET/CERES
100 CREAM (GRAM) TOPICAL DAILY
Qty: 30 TABLET | Refills: 2 | Status: ON HOLD | OUTPATIENT
Start: 2022-09-14 | End: 2022-12-09 | Stop reason: SDUPTHER

## 2022-09-13 RX ORDER — AZITHROMYCIN 500 MG/1
500 TABLET, FILM COATED ORAL ONCE
Qty: 1 TABLET | Refills: 0 | Status: SHIPPED | OUTPATIENT
Start: 2022-09-13 | End: 2022-09-13 | Stop reason: SDUPTHER

## 2022-09-13 RX ORDER — THIAMINE HCL 100 MG
100 TABLET ORAL DAILY
Status: DISCONTINUED | OUTPATIENT
Start: 2022-09-13 | End: 2022-09-13 | Stop reason: HOSPADM

## 2022-09-13 RX ORDER — FOLIC ACID 1 MG/1
1 TABLET ORAL DAILY
Qty: 30 TABLET | Refills: 2 | Status: SHIPPED | OUTPATIENT
Start: 2022-09-14 | End: 2022-12-21 | Stop reason: SDUPTHER

## 2022-09-13 RX ORDER — NICOTINE 7MG/24HR
1 PATCH, TRANSDERMAL 24 HOURS TRANSDERMAL DAILY
Qty: 30 PATCH | Refills: 2 | Status: SHIPPED | OUTPATIENT
Start: 2022-09-13 | End: 2022-12-21 | Stop reason: SDUPTHER

## 2022-09-13 RX ORDER — DICYCLOMINE HYDROCHLORIDE 10 MG/1
10 CAPSULE ORAL ONCE
Status: COMPLETED | OUTPATIENT
Start: 2022-09-13 | End: 2022-09-13

## 2022-09-13 RX ORDER — AZITHROMYCIN 500 MG/1
500 TABLET, FILM COATED ORAL ONCE
Qty: 1 TABLET | Refills: 0 | Status: SHIPPED | OUTPATIENT
Start: 2022-09-14 | End: 2022-09-14

## 2022-09-13 RX ORDER — PREDNISONE 20 MG/1
40 TABLET ORAL DAILY
Qty: 8 TABLET | Refills: 0 | Status: SHIPPED | OUTPATIENT
Start: 2022-09-14 | End: 2022-09-18

## 2022-09-13 RX ORDER — CEFPODOXIME PROXETIL 200 MG/1
200 TABLET, FILM COATED ORAL EVERY 12 HOURS
Qty: 8 TABLET | Refills: 0 | Status: SHIPPED | OUTPATIENT
Start: 2022-09-13 | End: 2022-09-17

## 2022-09-13 RX ORDER — FOLIC ACID 1 MG/1
1 TABLET ORAL DAILY
Status: DISCONTINUED | OUTPATIENT
Start: 2022-09-13 | End: 2022-09-13 | Stop reason: HOSPADM

## 2022-09-13 RX ADMIN — THERA TABS 1 TABLET: TAB at 09:09

## 2022-09-13 RX ADMIN — CEFTRIAXONE 1 G: 1 INJECTION, SOLUTION INTRAVENOUS at 06:09

## 2022-09-13 RX ADMIN — NICOTINE 1 PATCH: 7 PATCH, EXTENDED RELEASE TRANSDERMAL at 09:09

## 2022-09-13 RX ADMIN — TIOTROPIUM BROMIDE INHALATION SPRAY 2 PUFF: 3.12 SPRAY, METERED RESPIRATORY (INHALATION) at 09:09

## 2022-09-13 RX ADMIN — IPRATROPIUM BROMIDE AND ALBUTEROL SULFATE 3 ML: 2.5; .5 SOLUTION RESPIRATORY (INHALATION) at 04:09

## 2022-09-13 RX ADMIN — FOLIC ACID 1 MG: 1 TABLET ORAL at 09:09

## 2022-09-13 RX ADMIN — Medication 100 MG: at 09:09

## 2022-09-13 RX ADMIN — AZITHROMYCIN MONOHYDRATE 500 MG: 250 TABLET ORAL at 09:09

## 2022-09-13 RX ADMIN — DICYCLOMINE HYDROCHLORIDE 10 MG: 10 CAPSULE ORAL at 09:09

## 2022-09-13 RX ADMIN — PREDNISONE 40 MG: 20 TABLET ORAL at 09:09

## 2022-09-13 RX ADMIN — ACETAMINOPHEN 650 MG: 325 TABLET ORAL at 10:09

## 2022-09-13 RX ADMIN — NIFEDIPINE 30 MG: 30 TABLET, FILM COATED, EXTENDED RELEASE ORAL at 09:09

## 2022-09-13 RX ADMIN — FLUTICASONE FUROATE AND VILANTEROL TRIFENATATE 1 PUFF: 200; 25 POWDER RESPIRATORY (INHALATION) at 09:09

## 2022-09-13 NOTE — PLAN OF CARE
Pt with orders to d/c IV and d/c home. Tolerated d/c of Iv. Awake ,alert, and oriented with no acute distress noted . Reviewed discharge orders including : medicine orders, prescriptions, followup appts, and patient education materials for diet and diagnosis. Belongings packed for transport  to home. Ambulating out at discharge  via wheelchair

## 2022-09-13 NOTE — PLAN OF CARE
[facility] - Intensive Care ([facility])  Discharge Final Note    Primary Care Provider: [facility]    Expected Discharge Date: 9/13/2022    Final Discharge Note (most recent)       Final Note - 09/13/22 1456          Final Note    Assessment Type Final Discharge Note (P)      Anticipated Discharge Disposition Home or Self Care (P)         Post-Acute Status    Discharge Delays None known at this time (P)                      Important Message from Medicare  Important Message from Medicare regarding Discharge Appeal Rights: Given to patient/caregiver, Explained to patient/caregiver, Signed/date by patient/caregiver, Other (comments) (verbal: spoke with Gladis)     Date IMM was signed: 09/13/22  Time IMM was signed: 1344    Contact Info       [facility]   Relationship: PCP - General    [address]  [facility] [address]   Phone: [phone]       Next Steps: Follow up in 1 week(s)

## 2022-09-13 NOTE — PLAN OF CARE
Pt is AAOx4,VS WNL on 2 L NC/Bipap for sleep. Tylenol 650 mg given for headache . Pt turns and repositions independently. No skin breakdown noted. Pt pain and safety monitored q 1-2 hrs this shift . Bed locked and in lowest position. Rails elevated x 3. Brakes on. Call light and personal belongings in reach. Will continue monitor. Pt is ready for discharge

## 2022-09-13 NOTE — DISCHARGE SUMMARY
[facility] - Intensive Care ([address] Medicine  Discharge Summary      Patient Name: Baltazar Mccray  MRN: [id number]  Patient Class: IP- Inpatient  Admission Date: 9/11/2022  Hospital Length of Stay: 2 days  Discharge Date and Time:  09/13/2022 12:12 PM  Attending Physician: Vazquez Hauser MD   Discharging Provider: Vazquez Hauser MD  Primary Care Provider: [facility] Medicine Team: [facility] Vazquez Hauser MD    HPI:   62 y.o. M with COPD on 2-3L O2, nightly BiPAP, hx of pAF (not on AC), ELIZABETH, HFpEF, HTN, lung nodule, hx of seizures (not on antiepileptics), alcohol use, recent admission for COPD exacerbation who presents by EMS for acute SOB starting tonight despite use of inhalers and BiPAP machine. States symptoms initially improved at discharge on steroids but started returning after he finished his 3 day course. Same chronic dry cough with no change in frequency or sputum production, denies fever or chills. Pt called EMS, initial O2 saturation 87% on 2L, received 1 duo neb and 125 mg solumedrol en-route--arrived on 10L NRB and +EtOH. Pt no longer smoking, reports adherence to all medications. He did drink some alcohol today and can't remember if he ate prior.      In ED, patient is afebrile, , /90, RR 24, stating 92% on 10L, transitioned to BiPAP FiO2 40%. WBC 13.64. Hg 13.9 (bl). Plts 501. Cr 0.9 (bl). Bicarb 31. Bg 58-->201 repeat s/p 25g of D10. BNP 45. Trop 0.030. VBG 7.29/83/45/40, repeat VBG after continuous biPAP for 2 hours--> 7.25/80/48/36 with continued respiratory acidosis. Critical care consulted for AHHRF.                  * No surgery found *      Hospital Course:   Interval History/Significant Events: Patient states that feel somewhat better than yesterday,tolerated BiPAP through the night. Started on enoxaparin today. Patient stable and will be stepped down to hospital medicine.     9/12 hx of COPD on 2 l nc at home and bipap at  home  and pmhx of Afib not on anticoagulation , ELIZABETH,  HFpEF HTN, alcohol use and past hx of seizures (not on antiepilectics) . Admitted for acute hypoxemic respiratory failure secondary to copd exacerbation and concomitant  pneumonia. In the ICU, on BiPAP 4 hrs on and 4 off initially and now continious bipap through the night .   tolerated Bipap overnight. continue  prednisone 40mg x5 days for COPD exacerbation . on  IV ceftriaxone and Azithromycin 500 x 3days for   pneumonia  9/13 Transfer to hospital medicine . ordered bentyl x 1 for stomach cramps . leucocytosis of 15 .sats 97% on 2LNC. continue with Bipap qHs . has second exposure to smoking with brother. continue Nicotine patch. OK to discharge home with vantin, prednisone and azithromcyin        Goals of Care Treatment Preferences:  Code Status: Full Code    Assessment/Plan:      * Acute on chronic respiratory failure with hypoxia and hypercapnia     62 y.o. M with COPD on 2-3L O2, nightly BiPAP, hx of pAF (not on AC), ELIZABETH, HFpEF, HTN, lung nodule, hx of seizures (not on antiepileptics), alcohol use, recent admission for COPD exacerbation who presents by EMS for acute SOB.     Patient with Hypercapnic and Hypoxic Respiratory failure which is Acute on chronic.  he is on home oxygen at 2 LPM. Supplemental oxygen was provided and noted- Oxygen Concentration (%):  [30] 30.   Signs/symptoms of respiratory failure include- tachypnea, increased work of breathing and respiratory distress. Contributing diagnoses includes - Aspiration, COPD and Pneumonia Labs and images were reviewed. Patient Has recent ABG, which has been reviewed. Will treat underlying causes and adjust management of respiratory failure as follows:      Admission VBG 7.29/83/45/40, repeat VBG after continuous biPAP for 2 hours--> 7.25/80/48/36 with continued respiratory acidosis.      -  BiPAP  - prednisone 40 mg qd x5 days  -  prn VBG  - scheduled duonebs  - prn  albuterol  - sporadiclly  Smoking  -Nicotine patch 9/12 9/12 . hx of COPD on 2 l nc at home and bipap at home  and pmhx of Afib not on anticoagulation , ELIZABETH,  HFpEF HTN, alcohol use and past hx of seizures (not on antiepilectics) . Admitted for acute hypoxemic respiratory failure secondary to copd exacerbation and concomitant  pneumonia. In the ICU, on BiPAP 4 hrs on and 4 off initially and now continious bipap through the night .   tolerated Bipap overnight. continue  prednisone 40mg x5 days for COPD exacerbation . on  IV ceftriaxone and Azithromycin 500 x 3days for   pneumonia  9/13 Transfer to hospital medicine . ordered bentyl x 1 for stomach cramps . leucocytosis of 15 .sats 97% on 2LNC. continue with Bipap qHs   9/13 Transfer to hospital medicine . ordered bentyl x 1 for stomach cramps . leucocytosis of 15 .sats 97% on 2LNC. continue with Bipap qHs . has second exposure to smoking with brother. continue Nicotine patch. OK to discharge home with vantin, prednisone and azithromcyin . patient to continue oxygen at home and Bipap HS        Leucocytosis     Patient with leucocytosis         Recent Labs   Lab 09/11/22  0320 09/12/22  0422 09/13/22  0252   WBC 13.64* 15.35* 15.95*     . Afebrile. BCX 2  pending . likely secondary to steroids?   WBC counts uptrending.  Respiratory cultures - Predominance of oropharyngeal mara.     PNA (pneumonia)  ? Chest X ray -No acute findings.  Switched IV ceftriaxone and  azithromycin to oral 500mg x 3        (HFpEF) heart failure with preserved ejection fraction     Hx of HFpEF. On home lasix 40 qd.   Euvolemic on admission.      - restart home lasix as able  - strict I/O      Echo     Interpretation Summary  · The left ventricle is normal in size with concentric remodeling and normal systolic function.  · The estimated ejection fraction is 55%.  · Indeterminate left ventricular diastolic function.  · There is abnormal septal wall motion.  · Normal right ventricular size with  normal right ventricular systolic function.  · Intermediate central venous pressure (8 mmHg).  · Severe left atrial enlargement.  · An interatrial septal aneurysm is present.           Anemia     Patient's with microcytic anemia.. Hemoglobin stable. Etiology likely due to Iron deficiency.  Current CBC reviewed-          Recent Labs   Lab 09/11/22  0320 09/12/22  0422 09/13/22  0252   HGB 13.9* 12.1* 12.0*         Monitor CBC and transfuse if H/H drops below 7/21.         Atrial fibrillation     EKG sinus tach on admission.   Not on home anticoagulation or rate control.   - telemetry  - start BB if poor rate control  - consider AC after discussion with patient  - HR goal<110  - K>4, Mg>2      9/13  Patient is currently in sinus rhythm.[id number] Score: 1.  Anticoagulation not indicated        Alcohol use disorder, mild, abuse     Last drink day of admission.      - CIWA q4hr    Thiamine, folate and MVI     Elevated troponin     Suspect T2 NSTEMI 2/2 demand in setting of COPD exacerbation.  EKG sinus tach similar to prior, without noted ischemic changes. =  -minimum change in repeat trop 9/11 0.039.     serial troponins.  Cardiac Enzymes trend        Recent Labs   Lab 09/11/22  0320 09/11/22  0550 09/11/22  0848   TROPONINI 0.030* 0.034* 0.039*         Essential hypertension     Chronic, controlled.  Latest blood pressure and vitals reviewed-   Temp:  [97.4 °F (36.3 °C)-98 °F (36.7 °C)]   Pulse:  []   Resp:  [18-30]   BP: (123-171)/(60-97)   SpO2:  [89 %-99 %] .   Home meds for hypertension were reviewed      PRN meds if BP> 180/110 mm HG    continue home nifedipine 30 mg qd        COPD exacerbation  as above         Consults:   Consults (From admission, onward)        Status Ordering Provider     Inpatient consult to Critical Care Medicine  Once        Provider:  (Not yet assigned)    GAVIN Boucher          No new Assessment & Plan notes have been filed under this hospital service since the last  note was generated.  Service: Hospital Medicine    Final Active Diagnoses:    Diagnosis Date Noted POA    PRINCIPAL PROBLEM:  Acute on chronic respiratory failure with hypoxia and hypercapnia [J96.21, J96.22] 01/29/2020 Yes    PNA (pneumonia) [J18.9] 09/12/2022 Yes    Leucocytosis [D72.829] 09/12/2022 Yes    (HFpEF) heart failure with preserved ejection fraction [I50.30] 09/11/2022 Yes    Anemia [D64.9] 07/22/2022 Yes    Atrial fibrillation [I48.91] 03/05/2022 Yes    Alcohol use disorder, mild, abuse [F10.10]  Yes     Chronic    Elevated troponin [R77.8] 08/12/2018 Yes    Essential hypertension [I10] 05/19/2017 Yes     Chronic    COPD exacerbation [J44.1] 05/14/2017 Yes      Problems Resolved During this Admission:       Medications:  Reconciled Home Medications:      Medication List      Start taking these medications    cefpodoxime 200 MG tablet  Commonly known as: VANTIN  Take 1 tablet (200 mg total) by mouth every 12 (twelve) hours. for 4 days     folic acid 1 MG tablet  Commonly known as: FOLVITE  Take 1 tablet (1 mg total) by mouth once daily.  Start taking on: September 14, 2022     multivitamin Tab  Take 1 tablet by mouth once daily.  Start taking on: September 14, 2022     predniSONE 20 MG tablet  Commonly known as: DELTASONE  Take 2 tablets (40 mg total) by mouth once daily. for 4 days  Start taking on: September 14, 2022     thiamine 100 MG tablet  Take 1 tablet (100 mg total) by mouth once daily.  Start taking on: September 14, 2022        Change how you take these medications    azithromycin 500 MG tablet  Commonly known as: ZITHROMAX  Take 1 tablet (500 mg total) by mouth once. for 1 dose  Start taking on: September 14, 2022  What changed: when to take this        Continue taking these medications    albuterol 90 mcg/actuation inhaler  Commonly known as: PROVENTIL/VENTOLIN HFA  Inhale 1-2 puffs into the lungs every 6 (six) hours as needed for Wheezing. Rescue     albuterol-ipratropium 2.5  mg-0.5 mg/3 mL nebulizer solution  Commonly known as: DUO-NEB  Take 3 mLs by nebulization every 4 (four) hours as needed for Wheezing or Shortness of Breath. Rescue     BREZTRI AEROSPHERE 160-9-4.8 mcg/actuation Hfaa  Generic drug: budesonide-glycopyr-formoterol  Inhale 2 puffs into the lungs 2 (two) times daily. Rinse mouth after use. CONTROL INHALER     DALIRESP 500 mcg Tab  Generic drug: roflumilast  Take 500 mcg by mouth once daily.     ferrous sulfate 325 (65 FE) MG EC tablet  Take 1 tablet (325 mg total) by mouth once daily.     furosemide 20 MG tablet  Commonly known as: LASIX  Take 2 tablets (40 mg total) by mouth once daily.     ibuprofen 200 MG tablet  Commonly known as: ADVIL,MOTRIN  Take 800 mg by mouth daily as needed for Pain.     nicotine 7 mg/24 hr  Commonly known as: NICODERM CQ  Place 1 patch onto the skin once daily.     NIFEdipine 30 MG (OSM) 24 hr tablet  Commonly known as: PROCARDIA-XL  Take 1 tablet (30 mg total) by mouth once daily.        Stop taking these medications    SPIRIVA RESPIMAT 2.5 mcg/actuation inhaler  Generic drug: tiotropium bromide              Discharged Condition: fair    Disposition: Home or Self Care          Follow Up:     Patient Instructions:   No discharge procedures on file.    Laboratory/Diagnostic Data:    CBC/Anemia Labs: Coags:    Recent Labs   Lab 09/11/22 0320 09/12/22 0422 09/13/22 0252   WBC 13.64* 15.35* 15.95*   HGB 13.9* 12.1* 12.0*   HCT 45.3 40.4 38.3*   * 401 390   MCV 78* 78* 76*   RDW 22.7* 22.2* 22.3*    No results for input(s): PT, INR, APTT in the last 168 hours.     Chemistries: ABG:   Recent Labs   Lab 09/11/22 0320 09/12/22 0422 09/13/22 0252    135* 136   K 4.3 4.3 4.1   CL 92* 96 97   CO2 31* 31* 32*   BUN 12 15 18   CREATININE 0.9 0.8 0.7   CALCIUM 9.2 9.5 10.1   PROT 7.4 6.6 6.1   BILITOT 0.3 0.2 0.2   ALKPHOS 73 61 58   ALT 9* 8* 10   AST 14 14 11   MG  --  2.1 1.9   PHOS  --  2.7 3.4    Recent Labs   Lab 09/11/22  0325  09/11/22  0459 09/11/22  1043   PH 7.296* 7.259* 7.302*   PCO2 83.9* 80.5* 66.9*   PO2 45 48 55   HCO3 40.9* 36.0* 33.1*   POCSATURATED 73* 75* 84*   BE 14 9 7        POCT Glucose: HbA1c:    Recent Labs   Lab 09/11/22  0439 09/11/22  0512   POCTGLUCOSE 201* 187*    Hemoglobin A1C   Date Value Ref Range Status   02/10/2022 5.7 (H) 4.0 - 5.6 % Final     Comment:     ADA Screening Guidelines:  5.7-6.4%  Consistent with prediabetes  >or=6.5%  Consistent with diabetes    High levels of fetal hemoglobin interfere with the HbA1C  assay. Heterozygous hemoglobin variants (HbS, HgC, etc)do  not significantly interfere with this assay.   However, presence of multiple variants may affect accuracy.     05/30/2021 5.8 (H) 4.0 - 5.6 % Final     Comment:     ADA Screening Guidelines:  5.7-6.4%  Consistent with prediabetes  >or=6.5%  Consistent with diabetes    High levels of fetal hemoglobin interfere with the HbA1C  assay. Heterozygous hemoglobin variants (HbS, HgC, etc)do  not significantly interfere with this assay.   However, presence of multiple variants may affect accuracy.     05/23/2021 5.9 (H) 4.0 - 5.6 % Final     Comment:     ADA Screening Guidelines:  5.7-6.4%  Consistent with prediabetes  >or=6.5%  Consistent with diabetes    High levels of fetal hemoglobin interfere with the HbA1C  assay. Heterozygous hemoglobin variants (HbS, HgC, etc)do  not significantly interfere with this assay.   However, presence of multiple variants may affect accuracy.          Cardiac Enzymes: Ejection Fractions:    Recent Labs     09/11/22  0320 09/11/22  0550 09/11/22  0848   TROPONINI 0.030* 0.034* 0.039*    EF   Date Value Ref Range Status   08/11/2022 55 % Final   01/29/2022 55 % Final          No results for input(s): COLORU, APPEARANCEUA, PHUR, SPECGRAV, PROTEINUA, GLUCUA, KETONESU, BILIRUBINUA, OCCULTUA, NITRITE, UROBILINOGEN, LEUKOCYTESUR, RBCUA, WBCUA, BACTERIA, SQUAMEPITHEL, HYALINECASTS in the last 48 hours.    Invalid input(s):  WRIGHTSUR    Procalcitonin (ng/mL)   Date Value   09/11/2022 0.18   03/06/2022 0.10   06/04/2020 0.09   05/12/2020 0.06   05/06/2020 0.05     Lactate (Lactic Acid) (mmol/L)   Date Value   06/03/2021 1.5   04/17/2021 1.9   06/04/2020 1.9   05/12/2020 1.3   05/06/2020 1.2     BNP (pg/mL)   Date Value   09/11/2022 45   08/31/2022 325 (H)   08/10/2022 926 (H)   07/22/2022 81   03/13/2022 174 (H)     CRP (mg/L)   Date Value   06/04/2020 55.5 (H)   05/12/2020 8.0   05/06/2020 11.2 (H)   04/19/2020 40.1 (H)   04/03/2020 13.1 (H)     D-Dimer (mg/L FEU)   Date Value   01/28/2022 0.67 (H)   12/21/2021 0.91 (H)   04/17/2021 0.69 (H)   07/20/2020 0.70 (H)   03/15/2020 1.60 (H)     Ferritin (ng/mL)   Date Value   06/04/2020 1,016 (H)   05/12/2020 970 (H)   05/06/2020 889 (H)   04/19/2020 652 (H)   04/03/2020 540 (H)   03/15/2020 411 (H)     LD (U/L)   Date Value   06/04/2020 221   05/12/2020 190   05/06/2020 221   04/19/2020 224   04/03/2020 196     Troponin I (ng/mL)   Date Value   09/11/2022 0.039 (H)   09/11/2022 0.034 (H)   09/11/2022 0.030 (H)   08/31/2022 0.073 (H)   08/31/2022 0.079 (H)   07/22/2022 0.009   03/13/2022 0.025   03/05/2022 0.029 (H)     CPK (U/L)   Date Value   06/04/2020 128   05/12/2020 77   05/06/2020 108   04/19/2020 95   04/03/2020 129     No results found for this or any previous visit.  SARS-CoV2 (COVID-19) Qualitative PCR (no units)   Date Value   08/19/2021 Not Detected   08/05/2021 Detected (A)   07/29/2021 Not Detected   07/22/2021 Not Detected   06/24/2021 Not Detected   06/17/2021 Not Detected   04/24/2021 Not Detected   04/23/2020 Not Detected   04/21/2020 Not Detected     SARS-CoV-2 RNA, Amplification, Qual (no units)   Date Value   09/11/2022 Negative   08/31/2022 Negative   08/10/2022 Negative   07/22/2022 Negative   06/10/2021 Negative   07/20/2020 Negative   06/25/2020 Negative   06/04/2020 Negative   05/23/2020 Negative   05/12/2020 Negative     POC Rapid COVID (no units)   Date Value    03/13/2022 Negative   03/05/2022 Negative   02/09/2022 Negative   02/07/2022 Negative   01/28/2022 Negative   01/15/2022 Negative   01/01/2022 Negative   12/21/2021 Negative   06/07/2021 Negative   06/03/2021 Negative       Microbiology labs for the last week  Microbiology Results (last 7 days)     Procedure Component Value Units Date/Time    Blood culture [[id number]] Collected: 09/11/22 0601    Order Status: Completed Specimen: Blood from Peripheral, Forearm, Right Updated: 09/13/22 0812     Blood Culture, Routine No Growth to date      No Growth to date      No Growth to date    Blood culture [[id number]] Collected: 09/11/22 0601    Order Status: Completed Specimen: Blood from Peripheral, Forearm, Right Updated: 09/13/22 0812     Blood Culture, Routine No Growth to date      No Growth to date      No Growth to date    Culture, Respiratory with Gram Stain [[id number]] Collected: 09/11/22 2033    Order Status: Completed Specimen: Respiratory from Sputum, Expectorated Updated: 09/11/22 2207     Respiratory Culture Specimen inadequate - culture not performed     Gram Stain (Respiratory) >10epis/lfp and <than many WBC's     Gram Stain (Respiratory) Predominance of oropharyngeal mara. Please recollect.            Reviewed and noted in plan where applicable- Please see chart for full lab data.    Lines/Drains:       Peripheral IV - Single Lumen 09/11/22 0325 18 G Right Antecubital (Active)   Site Assessment Clean;Dry;Intact 09/12/22 2230   Extremity Assessment Distal to IV No abnormal discoloration 09/12/22 2230   Line Status Flushed;Saline locked 09/12/22 2230   Dressing Status Clean;Dry;Intact 09/12/22 2230   Dressing Intervention Integrity maintained 09/12/22 2230   Dressing Change Due 09/15/22 09/12/22 1501   Site Change Due 09/15/22 09/12/22 1905   Reason Not Rotated Not due 09/12/22 1905   Number of days: 2            Peripheral IV - Single Lumen 09/11/22 0325 18 G Anterior;Distal;Left Forearm (Active)   Site  Assessment Clean;Dry;Intact 09/12/22 2230   Extremity Assessment Distal to IV No abnormal discoloration 09/12/22 2230   Line Status Flushed;Saline locked 09/12/22 2230   Dressing Status Clean;Dry;Intact 09/12/22 2230   Dressing Intervention Integrity maintained 09/12/22 2230   Dressing Change Due 09/15/22 09/12/22 1905   Site Change Due 09/15/22 09/12/22 1905   Reason Not Rotated Not due 09/12/22 1905   Number of days: 2       Imaging  ECG Results          EKG 12-lead (Final result)  Result time 09/11/22 08:57:29    Final result by Interface, Lab In [facility] (09/11/22 08:57:29)             Narrative:    Test Reason :     Vent. Rate : 094 BPM     Atrial Rate : 094 BPM     P-R Int : 182 ms          QRS Dur : 072 ms      QT Int : 364 ms       P-R-T Axes : 076 084 081 degrees     QTc Int : 455 ms    Normal sinus rhythm  Normal ECG  When compared with ECG of 11-SEP-2022 03:20,  No significant change was found  Confirmed by Manuel Kellogg MD ([id number]) on 9/11/2022 8:57:21 AM    Referred By: [email or web address]   SELF           Confirmed By:Manuel Kellogg MD                           EKG 12-lead (Final result)  Result time 09/11/22 09:04:38    Final result by Interface, Lab In [facility] (09/11/22 09:04:38)             Narrative:    Test Reason :     Vent. Rate : 104 BPM     Atrial Rate : 104 BPM     P-R Int : 168 ms          QRS Dur : 072 ms      QT Int : 328 ms       P-R-T Axes : 082 085 078 degrees     QTc Int : 431 ms    Sinus tachycardia  Right atrial enlargement  Borderline Abnormal ECG  When compared with ECG of 31-AUG-2022 00:14,  No significant change was found  Confirmed by Manuel Kellogg MD ([id number]) on 9/11/2022 9:04:29 AM    Referred By: [email or web address]   SELF           Confirmed By:Manuel Kellogg MD                            Results for orders placed during the hospital encounter of 08/10/22    Echo    Interpretation Summary  · The left ventricle is normal in size with concentric remodeling and normal systolic function.  · The  estimated ejection fraction is 55%.  · Indeterminate left ventricular diastolic function.  · There is abnormal septal wall motion.  · Normal right ventricular size with normal right ventricular systolic function.  · Intermediate central venous pressure (8 mmHg).  · Severe left atrial enlargement.  · An interatrial septal aneurysm is present.      X-Ray Chest AP Portable  Narrative: EXAMINATION:  XR CHEST AP PORTABLE    CLINICAL HISTORY:  shortness of breath;    TECHNIQUE:  Single frontal view of the chest was performed.    COMPARISON:  08/31/2022.    FINDINGS:  Chronic increased markings, similar to prior.    Heart and lungs  appear unchanged when allowing for differences in technique and positioning.  Impression: No acute findings.    No significant change from prior study.    Electronically signed by: Antony Samayoa MD  Date:    09/11/2022  Time:    04:02      Imaging:  Imaging Results          X-Ray Chest AP Portable (Final result)  Result time 09/11/22 04:02:42    Final result by Antony Samayoa MD (09/11/22 04:02:42)             Impression:      No acute findings.    No significant change from prior study.      Electronically signed by: Antony Samayoa MD  Date:    09/11/2022  Time:    04:02           Narrative:    EXAMINATION:  XR CHEST AP PORTABLE    CLINICAL HISTORY:  shortness of breath;    TECHNIQUE:  Single frontal view of the chest was performed.    COMPARISON:  08/31/2022.    FINDINGS:  Chronic increased markings, similar to prior.    Heart and lungs  appear unchanged when allowing for differences in technique and positioning.                                Follow Up Instructions:     Future Appointments   Date Time Provider Department Center   9/19/2022  3:00 PM Susan Jimenez [facility]   9/28/2022  8:00 AM Susan Rae, NP [address]       Discharge Instructions:  No discharge procedures on file.    Vazquez Hauser MD  Attending Staff Physician  [facility]

## 2022-09-13 NOTE — NURSING
Patient arrived to the unit from CMICU via CMICU RN via wheelchair. Patient aaox4, vitals wnl. Afebrile. On 2L of O2 per nasal cannula. No acute distress noted at this time. Patient placed on bedside monitoring for telemetry and cont. O2 monitoring. Patient oriented to room and call light system. Patient independent with ambulation and ADLs. Bed in lowest position. All needs/concerns addressed. Respiratory called to place patient on bipap.

## 2022-09-13 NOTE — NURSING TRANSFER
Nursing Transfer Note      9/12/2022     Reason patient is being transferred: Stepdown    Transfer To: [id number]    Transfer via wheelchair    Transfer with 2 to O2, cardiac monitoring    Transported by RN    Medicines sent: Yes    Any special needs or follow-up needed: N/A    Chart send with patient: Yes    Notified: pt self notified    Patient reassessed at: 2200 (date, time)    Upon arrival to floor: patient oriented to room, call bell in reach, and bed in lowest position

## 2022-09-13 NOTE — PROGRESS NOTES
[facility] - Intensive Care ([address] Medicine  Progress Note    Patient Name: Baltazar Mccray  MRN: [id number]  Patient Class: IP- Inpatient   Admission Date: 9/11/2022  Length of Stay: 2 days  Attending Physician: Vazquez Hauser MD  Primary Care Provider: Rhonda Of Katia        Subjective:     Principal Problem:Acute on chronic respiratory failure with hypoxia and hypercapnia        HPI:  62 y.o. M with COPD on 2-3L O2, nightly BiPAP, hx of pAF (not on AC), ELIZABETH, HFpEF, HTN, lung nodule, hx of seizures (not on antiepileptics), alcohol use, recent admission for COPD exacerbation who presents by EMS for acute SOB starting tonight despite use of inhalers and BiPAP machine. States symptoms initially improved at discharge on steroids but started returning after he finished his 3 day course. Same chronic dry cough with no change in frequency or sputum production, denies fever or chills. Pt called EMS, initial O2 saturation 87% on 2L, received 1 duo neb and 125 mg solumedrol en-route--arrived on 10L NRB and +EtOH. Pt no longer smoking, reports adherence to all medications. He did drink some alcohol today and can't remember if he ate prior.      In ED, patient is afebrile, , /90, RR 24, stating 92% on 10L, transitioned to BiPAP FiO2 40%. WBC 13.64. Hg 13.9 (bl). Plts 501. Cr 0.9 (bl). Bicarb 31. Bg 58-->201 repeat s/p 25g of D10. BNP 45. Trop 0.030. VBG 7.29/83/45/40, repeat VBG after continuous biPAP for 2 hours--> 7.25/80/48/36 with continued respiratory acidosis. Critical care consulted for AHHRF.                Overview/Hospital Course:  Interval History/Significant Events: Patient states that feel somewhat better than yesterday,tolerated BiPAP through the night. Started on enoxaparin today. Patient stable and will be stepped down to hospital medicine.     9/12 hx of COPD on 2 l nc at home and bipap at home  and pmhx of Afib not on anticoagulation , ELIZABETH,  HFpEF HTN, alcohol use  and past hx of seizures (not on antiepilectics) . Admitted for acute hypoxemic respiratory failure secondary to copd exacerbation and concomitant  pneumonia. In the ICU, on BiPAP 4 hrs on and 4 off initially and now continious bipap through the night .   tolerated Bipap overnight. continue  prednisone 40mg x5 days for COPD exacerbation . on  IV ceftriaxone and Azithromycin 500 x 3days for   pneumonia  9/13 Transfer to hospital medicine . ordered bentyl x 1 for stomach cramps . leucocytosis of 15 .sats 97% on 2LNC. continue with Bipap qHs . has second exposure to smoking with brother. continue Nicotine patch. OK to discharge home with vantin, prednisone and azithromcyin           Review of Systems:   Pain scale:   Constitutional:  fever,  chills, headache, vision loss, hearing loss, weight loss, Generalized weakness, falls, loss of smell, loss of taste, poor appetite,  sore throat  Respiratory: cough, shortness of breath.   Cardiovascular: chest pain, dizziness, palpitations, orthopnea, swelling of feet, syncope  Gastrointestinal: nausea, vomiting, abdominal pain, diarrhea, black stool,  blood in stool, change in bowel habits  Genitourinary: hematuria, dysuria, urgency, frequency  Integument/Breast: rash,  pruritis  Hematologic/Lymphatic: easy bruising, lymphadenopathy  Musculoskeletal: arthralgias , myalgias, back pain, neck pain, knee pain  Neurological: confusion, seizures, tremors, slurred speech  Behavioral/Psych:  depression, anxiety, auditory or visual hallucinations     OBJECTIVE:     Physical Exam:  Body mass index is 20.67 kg/m².    Constitutional: Appears  thin built    Head: Normocephalic and atraumatic.   Neck: Normal range of motion. Neck supple.   Cardiovascular: Normal heart rate.  Regular heart rhythm.  Pulmonary/Chest: Effort normal.   Abdominal: No distension.  No tenderness  Musculoskeletal: Normal range of motion. No edema.   Neurological: Alert and oriented to person, place, and time. able to  move bilateral upper and lower extremities without limitation  Skin: Skin is warm and dry.   Psychiatric: Normal mood and affect. Behavior is normal.                  Vital Signs  Temp: 98.2 °F (36.8 °C) (09/13/22 1124)  Pulse: 71 (09/13/22 1124)  Resp: 16 (09/13/22 1124)  BP: (Abnormal) 153/86 (09/13/22 1124)  SpO2: 95 % (09/13/22 1124)     24 Hour VS Range    Temp:  [96.8 °F (36 °C)-98.9 °F (37.2 °C)]   Pulse:  [63-92]   Resp:  [16-29]   BP: (126-162)/(61-88)   SpO2:  [91 %-100 %]     Intake/Output Summary (Last 24 hours) at 9/13/2022 1212  Last data filed at 9/13/2022 0800  Gross per 24 hour   Intake 490 ml   Output 1500 ml   Net -1010 ml         I/O This Shift:  I/O this shift:  In: -   Out: 200 [Urine:200]    Wt Readings from Last 3 Encounters:   09/11/22 63.5 kg (139 lb 15.9 oz)   08/31/22 60.3 kg (132 lb 15 oz)   08/29/22 58.5 kg (129 lb)       I have personally reviewed the vitals and recorded Intake/Output     Laboratory/Diagnostic Data:    CBC/Anemia Labs: Coags:    Recent Labs   Lab 09/11/22 0320 09/12/22 0422 09/13/22 0252   WBC 13.64* 15.35* 15.95*   HGB 13.9* 12.1* 12.0*   HCT 45.3 40.4 38.3*   * 401 390   MCV 78* 78* 76*   RDW 22.7* 22.2* 22.3*    No results for input(s): PT, INR, APTT in the last 168 hours.     Chemistries: ABG:   Recent Labs   Lab 09/11/22 0320 09/12/22 0422 09/13/22 0252    135* 136   K 4.3 4.3 4.1   CL 92* 96 97   CO2 31* 31* 32*   BUN 12 15 18   CREATININE 0.9 0.8 0.7   CALCIUM 9.2 9.5 10.1   PROT 7.4 6.6 6.1   BILITOT 0.3 0.2 0.2   ALKPHOS 73 61 58   ALT 9* 8* 10   AST 14 14 11   MG  --  2.1 1.9   PHOS  --  2.7 3.4    Recent Labs   Lab 09/11/22  0327 09/11/22  0459 09/11/22  1043   PH 7.296* 7.259* 7.302*   PCO2 83.9* 80.5* 66.9*   PO2 45 48 55   HCO3 40.9* 36.0* 33.1*   POCSATURATED 73* 75* 84*   BE 14 9 7        POCT Glucose: HbA1c:    Recent Labs   Lab 09/11/22  0439 09/11/22  0512   POCTGLUCOSE 201* 187*    Hemoglobin A1C   Date Value Ref Range Status    02/10/2022 5.7 (H) 4.0 - 5.6 % Final     Comment:     ADA Screening Guidelines:  5.7-6.4%  Consistent with prediabetes  >or=6.5%  Consistent with diabetes    High levels of fetal hemoglobin interfere with the HbA1C  assay. Heterozygous hemoglobin variants (HbS, HgC, etc)do  not significantly interfere with this assay.   However, presence of multiple variants may affect accuracy.     05/30/2021 5.8 (H) 4.0 - 5.6 % Final     Comment:     ADA Screening Guidelines:  5.7-6.4%  Consistent with prediabetes  >or=6.5%  Consistent with diabetes    High levels of fetal hemoglobin interfere with the HbA1C  assay. Heterozygous hemoglobin variants (HbS, HgC, etc)do  not significantly interfere with this assay.   However, presence of multiple variants may affect accuracy.     05/23/2021 5.9 (H) 4.0 - 5.6 % Final     Comment:     ADA Screening Guidelines:  5.7-6.4%  Consistent with prediabetes  >or=6.5%  Consistent with diabetes    High levels of fetal hemoglobin interfere with the HbA1C  assay. Heterozygous hemoglobin variants (HbS, HgC, etc)do  not significantly interfere with this assay.   However, presence of multiple variants may affect accuracy.          Cardiac Enzymes: Ejection Fractions:    Recent Labs     09/11/22  0320 09/11/22  0550 09/11/22  0848   TROPONINI 0.030* 0.034* 0.039*    EF   Date Value Ref Range Status   08/11/2022 55 % Final   01/29/2022 55 % Final          No results for input(s): COLORU, APPEARANCEUA, PHUR, SPECGRAV, PROTEINUA, GLUCUA, KETONESU, BILIRUBINUA, OCCULTUA, NITRITE, UROBILINOGEN, LEUKOCYTESUR, RBCUA, WBCUA, BACTERIA, SQUAMEPITHEL, HYALINECASTS in the last 48 hours.    Invalid input(s): WRIGHTSUR    Procalcitonin (ng/mL)   Date Value   09/11/2022 0.18   03/06/2022 0.10   06/04/2020 0.09   05/12/2020 0.06   05/06/2020 0.05     Lactate (Lactic Acid) (mmol/L)   Date Value   06/03/2021 1.5   04/17/2021 1.9   06/04/2020 1.9   05/12/2020 1.3   05/06/2020 1.2     BNP (pg/mL)   Date Value    09/11/2022 45   08/31/2022 325 (H)   08/10/2022 926 (H)   07/22/2022 81   03/13/2022 174 (H)     CRP (mg/L)   Date Value   06/04/2020 55.5 (H)   05/12/2020 8.0   05/06/2020 11.2 (H)   04/19/2020 40.1 (H)   04/03/2020 13.1 (H)     D-Dimer (mg/L FEU)   Date Value   01/28/2022 0.67 (H)   12/21/2021 0.91 (H)   04/17/2021 0.69 (H)   07/20/2020 0.70 (H)   03/15/2020 1.60 (H)     Ferritin (ng/mL)   Date Value   06/04/2020 1,016 (H)   05/12/2020 970 (H)   05/06/2020 889 (H)   04/19/2020 652 (H)   04/03/2020 540 (H)   03/15/2020 411 (H)     LD (U/L)   Date Value   06/04/2020 221   05/12/2020 190   05/06/2020 221   04/19/2020 224   04/03/2020 196     Troponin I (ng/mL)   Date Value   09/11/2022 0.039 (H)   09/11/2022 0.034 (H)   09/11/2022 0.030 (H)   08/31/2022 0.073 (H)   08/31/2022 0.079 (H)   07/22/2022 0.009   03/13/2022 0.025   03/05/2022 0.029 (H)     CPK (U/L)   Date Value   06/04/2020 128   05/12/2020 77   05/06/2020 108   04/19/2020 95   04/03/2020 129     No results found for this or any previous visit.  SARS-CoV2 (COVID-19) Qualitative PCR (no units)   Date Value   08/19/2021 Not Detected   08/05/2021 Detected (A)   07/29/2021 Not Detected   07/22/2021 Not Detected   06/24/2021 Not Detected   06/17/2021 Not Detected   04/24/2021 Not Detected   04/23/2020 Not Detected   04/21/2020 Not Detected     SARS-CoV-2 RNA, Amplification, Qual (no units)   Date Value   09/11/2022 Negative   08/31/2022 Negative   08/10/2022 Negative   07/22/2022 Negative   06/10/2021 Negative   07/20/2020 Negative   06/25/2020 Negative   06/04/2020 Negative   05/23/2020 Negative   05/12/2020 Negative     POC Rapid COVID (no units)   Date Value   03/13/2022 Negative   03/05/2022 Negative   02/09/2022 Negative   02/07/2022 Negative   01/28/2022 Negative   01/15/2022 Negative   01/01/2022 Negative   12/21/2021 Negative   06/07/2021 Negative   06/03/2021 Negative       Microbiology labs for the last week  Microbiology Results (last 7 days)        Procedure Component Value Units Date/Time    Blood culture [[id number]] Collected: 09/11/22 0601    Order Status: Completed Specimen: Blood from Peripheral, Forearm, Right Updated: 09/13/22 0812     Blood Culture, Routine No Growth to date      No Growth to date      No Growth to date    Blood culture [[id number]] Collected: 09/11/22 0601    Order Status: Completed Specimen: Blood from Peripheral, Forearm, Right Updated: 09/13/22 0812     Blood Culture, Routine No Growth to date      No Growth to date      No Growth to date    Culture, Respiratory with Gram Stain [[id number]] Collected: 09/11/22 2033    Order Status: Completed Specimen: Respiratory from Sputum, Expectorated Updated: 09/11/22 2207     Respiratory Culture Specimen inadequate - culture not performed     Gram Stain (Respiratory) >10epis/lfp and <than many WBC's     Gram Stain (Respiratory) Predominance of oropharyngeal mara. Please recollect.            Reviewed and noted in plan where applicable- Please see chart for full lab data.    Lines/Drains:       Peripheral IV - Single Lumen 09/11/22 0325 18 G Right Antecubital (Active)   Site Assessment Clean;Dry;Intact 09/12/22 2230   Extremity Assessment Distal to IV No abnormal discoloration 09/12/22 2230   Line Status Flushed;Saline locked 09/12/22 2230   Dressing Status Clean;Dry;Intact 09/12/22 2230   Dressing Intervention Integrity maintained 09/12/22 2230   Dressing Change Due 09/15/22 09/12/22 1501   Site Change Due 09/15/22 09/12/22 1905   Reason Not Rotated Not due 09/12/22 1905   Number of days: 2            Peripheral IV - Single Lumen 09/11/22 0325 18 G Anterior;Distal;Left Forearm (Active)   Site Assessment Clean;Dry;Intact 09/12/22 2230   Extremity Assessment Distal to IV No abnormal discoloration 09/12/22 2230   Line Status Flushed;Saline locked 09/12/22 2230   Dressing Status Clean;Dry;Intact 09/12/22 2230   Dressing Intervention Integrity maintained 09/12/22 2230   Dressing Change Due 09/15/22  09/12/22 1905   Site Change Due 09/15/22 09/12/22 1905   Reason Not Rotated Not due 09/12/22 1905   Number of days: 2       Imaging  ECG Results              EKG 12-lead (Final result)  Result time 09/11/22 08:57:29      Final result by Interface, Lab In [facility] (09/11/22 08:57:29)               Narrative:    Test Reason :     Vent. Rate : 094 BPM     Atrial Rate : 094 BPM     P-R Int : 182 ms          QRS Dur : 072 ms      QT Int : 364 ms       P-R-T Axes : 076 084 081 degrees     QTc Int : 455 ms    Normal sinus rhythm  Normal ECG  When compared with ECG of 11-SEP-2022 03:20,  No significant change was found  Confirmed by Manuel Kellogg MD ([id number]) on 9/11/2022 8:57:21 AM    Referred By: [email or web address]   SELF           Confirmed By:Manuel Kellogg MD                                   EKG 12-lead (Final result)  Result time 09/11/22 09:04:38      Final result by Interface, Lab In [facility] (09/11/22 09:04:38)               Narrative:    Test Reason :     Vent. Rate : 104 BPM     Atrial Rate : 104 BPM     P-R Int : 168 ms          QRS Dur : 072 ms      QT Int : 328 ms       P-R-T Axes : 082 085 078 degrees     QTc Int : 431 ms    Sinus tachycardia  Right atrial enlargement  Borderline Abnormal ECG  When compared with ECG of 31-AUG-2022 00:14,  No significant change was found  Confirmed by Manuel Kellogg MD ([id number]) on 9/11/2022 9:04:29 AM    Referred By: [email or web address]   SELF           Confirmed By:Manuel Kellogg MD                                  Results for orders placed during the hospital encounter of 08/10/22    Echo    Interpretation Summary  · The left ventricle is normal in size with concentric remodeling and normal systolic function.  · The estimated ejection fraction is 55%.  · Indeterminate left ventricular diastolic function.  · There is abnormal septal wall motion.  · Normal right ventricular size with normal right ventricular systolic function.  · Intermediate central venous pressure (8 mmHg).  · Severe left atrial  enlargement.  · An interatrial septal aneurysm is present.      X-Ray Chest AP Portable  Narrative: EXAMINATION:  XR CHEST AP PORTABLE    CLINICAL HISTORY:  shortness of breath;    TECHNIQUE:  Single frontal view of the chest was performed.    COMPARISON:  08/31/2022.    FINDINGS:  Chronic increased markings, similar to prior.    Heart and lungs  appear unchanged when allowing for differences in technique and positioning.  Impression: No acute findings.    No significant change from prior study.    Electronically signed by: Antony Samayoa MD  Date:    09/11/2022  Time:    04:02      Labs, Imaging, EKG and Diagnostic results from 9/13/2022 were reviewed.    Medications:  Medication list was reviewed and changes noted under Assessment/Plan.  No current facility-administered medications on file prior to encounter.     Current Outpatient Medications on File Prior to Encounter   Medication Sig Dispense Refill    albuterol (PROVENTIL/VENTOLIN HFA) 90 mcg/actuation inhaler Inhale 1-2 puffs into the lungs every 6 (six) hours as needed for Wheezing. Rescue 8.5 g 11    albuterol-ipratropium (DUO-NEB) 2.5 mg-0.5 mg/3 mL nebulizer solution Take 3 mLs by nebulization every 4 (four) hours as needed for Wheezing or Shortness of Breath. Rescue 180 mL 11    budesonide-glycopyr-formoterol (BREZTRI AEROSPHERE) 160-9-4.8 mcg/actuation HFAA Inhale 2 puffs into the lungs 2 (two) times daily. Rinse mouth after use. CONTROL INHALER 10.7 g 11    furosemide (LASIX) 20 MG tablet Take 2 tablets (40 mg total) by mouth once daily. 60 tablet 11    NIFEdipine (PROCARDIA-XL) 30 MG (OSM) 24 hr tablet Take 1 tablet (30 mg total) by mouth once daily. 30 tablet 11    [DISCONTINUED] azithromycin (ZITHROMAX) 500 MG tablet Take 1 tablet (500 mg total) by mouth every Mon, Wed, Fri. 36 tablet 0    ferrous sulfate 325 (65 FE) MG EC tablet Take 1 tablet (325 mg total) by mouth once daily. 30 tablet 1    ibuprofen (ADVIL,MOTRIN) 200 MG tablet Take 800 mg by  mouth daily as needed for Pain.      nicotine (NICODERM CQ) 7 mg/24 hr Place 1 patch onto the skin once daily. 14 patch 0    roflumilast (DALIRESP) 500 mcg Tab Take 500 mcg by mouth once daily.      [DISCONTINUED] tiotropium bromide (SPIRIVA RESPIMAT) 2.5 mcg/actuation inhaler Inhale 2 puffs into the lungs Daily. Controller 4 g 5     Scheduled Medications:  cefTRIAXone (ROCEPHIN) IVPB, 1 g, Intravenous, Q24H  enoxaparin, 40 mg, Subcutaneous, Daily  fluticasone furoate-vilanteroL, 1 puff, Inhalation, Daily   And  tiotropium bromide, 2 puff, Inhalation, Daily  folic acid, 1 mg, Oral, Daily  multivitamin, 1 tablet, Oral, Daily  nicotine, 1 patch, Transdermal, Daily  NIFEdipine, 30 mg, Oral, Daily  predniSONE, 40 mg, Oral, Daily  thiamine, 100 mg, Oral, Daily    PRN: acetaminophen, albuterol, dextrose 10%, dextrose 10%, glucagon (human recombinant), glucose, glucose, melatonin, methocarbamoL, sodium chloride 0.9%  Infusions:   Estimated Creatinine Clearance: 100.8 mL/min (based on SCr of 0.7 mg/dL).    Assessment/Plan:      * Acute on chronic respiratory failure with hypoxia and hypercapnia     62 y.o. M with COPD on 2-3L O2, nightly BiPAP, hx of pAF (not on AC), ELIZABETH, HFpEF, HTN, lung nodule, hx of seizures (not on antiepileptics), alcohol use, recent admission for COPD exacerbation who presents by EMS for acute SOB.     Patient with Hypercapnic and Hypoxic Respiratory failure which is Acute on chronic.  he is on home oxygen at 2 LPM. Supplemental oxygen was provided and noted- Oxygen Concentration (%):  [30] 30.   Signs/symptoms of respiratory failure include- tachypnea, increased work of breathing and respiratory distress. Contributing diagnoses includes - Aspiration, COPD and Pneumonia Labs and images were reviewed. Patient Has recent ABG, which has been reviewed. Will treat underlying causes and adjust management of respiratory failure as follows:      Admission VBG 7.29/83/45/40, repeat VBG after continuous biPAP  for 2 hours--> 7.25/80/48/36 with continued respiratory acidosis.      -  BiPAP  - prednisone 40 mg qd x5 days  -  prn VBG  - scheduled duonebs  - prn albuterol  - sporadiclly  Smoking  -Nicotine patch 9/12 9/12 . hx of COPD on 2 l nc at home and bipap at home  and pmhx of Afib not on anticoagulation , ELIZABETH,  HFpEF HTN, alcohol use and past hx of seizures (not on antiepilectics) . Admitted for acute hypoxemic respiratory failure secondary to copd exacerbation and concomitant  pneumonia. In the ICU, on BiPAP 4 hrs on and 4 off initially and now continious bipap through the night .   tolerated Bipap overnight. continue  prednisone 40mg x5 days for COPD exacerbation . on  IV ceftriaxone and Azithromycin 500 x 3days for   pneumonia  9/13 Transfer to hospital medicine . ordered bentyl x 1 for stomach cramps . leucocytosis of 15 .sats 97% on 2LNC. continue with Bipap qHs   9/13 Transfer to hospital medicine . ordered bentyl x 1 for stomach cramps . leucocytosis of 15 .sats 97% on 2LNC. continue with Bipap qHs . has second exposure to smoking with brother. continue Nicotine patch. OK to discharge home with vantin, prednisone and azithromcyin . patient to continue oxygen at home and Bipap HS      Leucocytosis    Patient with leucocytosis   Recent Labs   Lab 09/11/22  0320 09/12/22  0422 09/13/22  0252   WBC 13.64* 15.35* 15.95*     . Afebrile. BCX 2  pending . likely secondary to steroids?   WBC counts uptrending.  Respiratory cultures - Predominance of oropharyngeal mara.    PNA (pneumonia)  ? Chest X ray -No acute findings.  Switched IV ceftriaxone and  azithromycin to oral 500mg x 3       (HFpEF) heart failure with preserved ejection fraction     Hx of HFpEF. On home lasix 40 qd.   Euvolemic on admission.      - restart home lasix as able  - strict I/O      Echo    Interpretation Summary  · The left ventricle is normal in size with concentric remodeling and normal systolic function.  · The estimated ejection fraction is  55%.  · Indeterminate left ventricular diastolic function.  · There is abnormal septal wall motion.  · Normal right ventricular size with normal right ventricular systolic function.  · Intermediate central venous pressure (8 mmHg).  · Severe left atrial enlargement.  · An interatrial septal aneurysm is present.        Anemia    Patient's with microcytic anemia.. Hemoglobin stable. Etiology likely due to Iron deficiency.  Current CBC reviewed-    Recent Labs   Lab 09/11/22  0320 09/12/22  0422 09/13/22  0252   HGB 13.9* 12.1* 12.0*       Monitor CBC and transfuse if H/H drops below 7/21.       Atrial fibrillation    EKG sinus tach on admission.   Not on home anticoagulation or rate control.   - telemetry  - start BB if poor rate control  - consider AC after discussion with patient  - HR goal<110  - K>4, Mg>2      9/13  Patient is currently in sinus rhythm.[id number] Score: 1.  Anticoagulation not indicated      Alcohol use disorder, mild, abuse     Last drink day of admission.      - CIWA q4hr    Thiamine, folate and MVI    Elevated troponin    Suspect T2 NSTEMI 2/2 demand in setting of COPD exacerbation.  EKG sinus tach similar to prior, without noted ischemic changes. =  -minimum change in repeat trop 9/11 0.039.     serial troponins.  Cardiac Enzymes trend  Recent Labs   Lab 09/11/22  0320 09/11/22  0550 09/11/22  0848   TROPONINI 0.030* 0.034* 0.039*       Essential hypertension    Chronic, controlled.  Latest blood pressure and vitals reviewed-   Temp:  [97.4 °F (36.3 °C)-98 °F (36.7 °C)]   Pulse:  []   Resp:  [18-30]   BP: (123-171)/(60-97)   SpO2:  [89 %-99 %] .   Home meds for hypertension were reviewed     PRN meds if BP> 180/110 mm HG    continue home nifedipine 30 mg qd      COPD exacerbation  as above    VTE Risk Mitigation (From admission, onward)           Ordered     enoxaparin injection 40 mg  Daily         09/12/22 1002     IP VTE HIGH RISK PATIENT  Once         09/12/22 1002     Place  sequential compression device  Until discontinued         09/11/22 0526                    Discharge Planning   RUTH: 9/15/2022     Code Status: Full Code   Is the patient medically ready for discharge?: No    Reason for patient still in hospital (select all that apply): Treatment  Discharge Plan A: Home                  Vazquez Hauser MD  Department of Hospital Medicine   [facility] - Intensive Care ([place]-[facility])

## 2022-09-13 NOTE — PLAN OF CARE
Pt needs a ride home he does not have a transport tank with him, verified address on facesheet will request transport via w/c van if they can have o2 for transport,  Pt also wanted to know about humidified air explained to pt to call the company on the concentrator and they should be able to bring him the bottles after talking with pt he thinks he may have the container at home he will check and if not he will contact his supplier

## 2022-09-13 NOTE — PLAN OF CARE
Problem: Adult Inpatient Plan of Care  Goal: Plan of Care Review  Outcome: Ongoing, Progressing     Problem: Adult Inpatient Plan of Care  Goal: Patient-Specific Goal (Individualized)  Outcome: Ongoing, Progressing     Problem: Obstructive Sleep Apnea Risk or Actual  Goal: Unobstructed Breathing During Sleep  Outcome: Ongoing, Progressing     Problem: Fluid Imbalance (Pneumonia)  Goal: Fluid Balance  Outcome: Ongoing, Progressing     Problem: Respiratory Compromise (Pneumonia)  Goal: Effective Oxygenation and Ventilation  Outcome: Ongoing, Progressing      ED admit, off cont BIPAP 9/12: SD orders  9/12 PM: pt c/o HA tylenol administered. Pt came to unit on nasal canula on 2L O2. Bipap placed by RRT

## 2022-09-14 ENCOUNTER — PATIENT OUTREACH (OUTPATIENT)
Dept: ADMINISTRATIVE | Facility: CLINIC | Age: 62
End: 2022-09-14
Payer: MEDICARE

## 2022-09-14 NOTE — PROGRESS NOTES
C3 nurse attempted to contact Baltazar Mahendra  for a TCC post hospital discharge follow up call. No answer. The patient does not have a scheduled HOSFU appointment, and the pt does not have an [facility] PCP.

## 2022-09-14 NOTE — PROGRESS NOTES
C3 Nurse made second attempt to reach patient for TCC call. Left voicemail asking patient to please call [phone] and leave first name, last name, and , and Jazmín will return call.

## 2022-09-16 LAB
BACTERIA BLD CULT: NORMAL
BACTERIA BLD CULT: NORMAL

## 2022-09-26 ENCOUNTER — HOSPITAL ENCOUNTER (OUTPATIENT)
Facility: HOSPITAL | Age: 62
End: 2022-09-28
Attending: STUDENT IN AN ORGANIZED HEALTH CARE EDUCATION/TRAINING PROGRAM | Admitting: FAMILY MEDICINE
Payer: MEDICARE

## 2022-09-26 DIAGNOSIS — R94.31 T WAVE INVERSION IN EKG: ICD-10-CM

## 2022-09-26 DIAGNOSIS — J44.1 COPD EXACERBATION: Primary | ICD-10-CM

## 2022-09-26 DIAGNOSIS — J42 CHRONIC BRONCHITIS, UNSPECIFIED CHRONIC BRONCHITIS TYPE: Chronic | ICD-10-CM

## 2022-09-26 DIAGNOSIS — R06.02 SOB (SHORTNESS OF BREATH): ICD-10-CM

## 2022-09-26 LAB
ALBUMIN SERPL BCP-MCNC: 3.1 G/DL (ref 3.5–5.2)
ALP SERPL-CCNC: 86 U/L (ref 55–135)
ALT SERPL W/O P-5'-P-CCNC: 8 U/L (ref 10–44)
ANION GAP SERPL CALC-SCNC: 12 MMOL/L (ref 8–16)
AST SERPL-CCNC: 15 U/L (ref 10–40)
BASOPHILS # BLD AUTO: 0.05 K/UL (ref 0–0.2)
BASOPHILS NFR BLD: 0.4 % (ref 0–1.9)
BILIRUB SERPL-MCNC: 0.8 MG/DL (ref 0.1–1)
BUN SERPL-MCNC: 11 MG/DL (ref 6–20)
CALCIUM SERPL-MCNC: 8.8 MG/DL (ref 8.7–10.5)
CHLORIDE SERPL-SCNC: 90 MMOL/L (ref 95–110)
CO2 SERPL-SCNC: 32 MMOL/L (ref 23–29)
CREAT SERPL-MCNC: 0.7 MG/DL (ref 0.5–1.4)
CTP QC/QA: YES
DIFFERENTIAL METHOD: ABNORMAL
EOSINOPHIL # BLD AUTO: 0.4 K/UL (ref 0–0.5)
EOSINOPHIL NFR BLD: 3.2 % (ref 0–8)
ERYTHROCYTE [DISTWIDTH] IN BLOOD BY AUTOMATED COUNT: 22.7 % (ref 11.5–14.5)
EST. GFR  (NO RACE VARIABLE): >60 ML/MIN/1.73 M^2
GLUCOSE SERPL-MCNC: 107 MG/DL (ref 70–110)
GLUCOSE SERPL-MCNC: 67 MG/DL (ref 70–110)
HCT VFR BLD AUTO: 42.6 % (ref 40–54)
HGB BLD-MCNC: 13.1 G/DL (ref 14–18)
IMM GRANULOCYTES # BLD AUTO: 0.08 K/UL (ref 0–0.04)
IMM GRANULOCYTES NFR BLD AUTO: 0.6 % (ref 0–0.5)
LIPASE SERPL-CCNC: 12 U/L (ref 4–60)
LYMPHOCYTES # BLD AUTO: 2.3 K/UL (ref 1–4.8)
LYMPHOCYTES NFR BLD: 18.5 % (ref 18–48)
MCH RBC QN AUTO: 23.8 PG (ref 27–31)
MCHC RBC AUTO-ENTMCNC: 30.8 G/DL (ref 32–36)
MCV RBC AUTO: 77 FL (ref 82–98)
MONOCYTES # BLD AUTO: 1.4 K/UL (ref 0.3–1)
MONOCYTES NFR BLD: 11.1 % (ref 4–15)
NEUTROPHILS # BLD AUTO: 8.3 K/UL (ref 1.8–7.7)
NEUTROPHILS NFR BLD: 66.2 % (ref 38–73)
NRBC BLD-RTO: 0 /100 WBC
PLATELET # BLD AUTO: 294 K/UL (ref 150–450)
PMV BLD AUTO: 8.8 FL (ref 9.2–12.9)
POC MOLECULAR INFLUENZA A AGN: NEGATIVE
POC MOLECULAR INFLUENZA B AGN: NEGATIVE
POCT GLUCOSE: 107 MG/DL (ref 70–110)
POTASSIUM SERPL-SCNC: 3.9 MMOL/L (ref 3.5–5.1)
PROT SERPL-MCNC: 6.9 G/DL (ref 6–8.4)
RBC # BLD AUTO: 5.51 M/UL (ref 4.6–6.2)
SODIUM SERPL-SCNC: 134 MMOL/L (ref 136–145)
TROPONIN I SERPL DL<=0.01 NG/ML-MCNC: 0.04 NG/ML (ref 0–0.03)
WBC # BLD AUTO: 12.54 K/UL (ref 3.9–12.7)

## 2022-09-26 PROCEDURE — 99285 EMERGENCY DEPT VISIT HI MDM: CPT | Mod: 25

## 2022-09-26 PROCEDURE — G0378 HOSPITAL OBSERVATION PER HR: HCPCS

## 2022-09-26 PROCEDURE — 99285 PR EMERGENCY DEPT VISIT,LEVEL V: ICD-10-PCS | Mod: ,,, | Performed by: STUDENT IN AN ORGANIZED HEALTH CARE EDUCATION/TRAINING PROGRAM

## 2022-09-26 PROCEDURE — 94640 AIRWAY INHALATION TREATMENT: CPT | Mod: XB

## 2022-09-26 PROCEDURE — 80053 COMPREHEN METABOLIC PANEL: CPT | Performed by: STUDENT IN AN ORGANIZED HEALTH CARE EDUCATION/TRAINING PROGRAM

## 2022-09-26 PROCEDURE — 63600175 PHARM REV CODE 636 W HCPCS: Performed by: STUDENT IN AN ORGANIZED HEALTH CARE EDUCATION/TRAINING PROGRAM

## 2022-09-26 PROCEDURE — 82962 GLUCOSE BLOOD TEST: CPT

## 2022-09-26 PROCEDURE — 99900035 HC TECH TIME PER 15 MIN (STAT)

## 2022-09-26 PROCEDURE — 99285 EMERGENCY DEPT VISIT HI MDM: CPT | Mod: ,,, | Performed by: STUDENT IN AN ORGANIZED HEALTH CARE EDUCATION/TRAINING PROGRAM

## 2022-09-26 PROCEDURE — 27000221 HC OXYGEN, UP TO 24 HOURS

## 2022-09-26 PROCEDURE — 85025 COMPLETE CBC W/AUTO DIFF WBC: CPT | Performed by: STUDENT IN AN ORGANIZED HEALTH CARE EDUCATION/TRAINING PROGRAM

## 2022-09-26 PROCEDURE — 94761 N-INVAS EAR/PLS OXIMETRY MLT: CPT

## 2022-09-26 PROCEDURE — 83690 ASSAY OF LIPASE: CPT | Performed by: STUDENT IN AN ORGANIZED HEALTH CARE EDUCATION/TRAINING PROGRAM

## 2022-09-26 PROCEDURE — 93010 ELECTROCARDIOGRAM REPORT: CPT | Mod: ,,, | Performed by: INTERNAL MEDICINE

## 2022-09-26 PROCEDURE — 93005 ELECTROCARDIOGRAM TRACING: CPT

## 2022-09-26 PROCEDURE — 25000242 PHARM REV CODE 250 ALT 637 W/ HCPCS: Performed by: STUDENT IN AN ORGANIZED HEALTH CARE EDUCATION/TRAINING PROGRAM

## 2022-09-26 PROCEDURE — 93010 EKG 12-LEAD: ICD-10-PCS | Mod: ,,, | Performed by: INTERNAL MEDICINE

## 2022-09-26 PROCEDURE — 84484 ASSAY OF TROPONIN QUANT: CPT | Performed by: STUDENT IN AN ORGANIZED HEALTH CARE EDUCATION/TRAINING PROGRAM

## 2022-09-26 PROCEDURE — 87502 INFLUENZA DNA AMP PROBE: CPT

## 2022-09-26 PROCEDURE — 96375 TX/PRO/DX INJ NEW DRUG ADDON: CPT

## 2022-09-26 RX ORDER — PREDNISONE 20 MG/1
60 TABLET ORAL
Status: COMPLETED | OUTPATIENT
Start: 2022-09-26 | End: 2022-09-26

## 2022-09-26 RX ORDER — IPRATROPIUM BROMIDE AND ALBUTEROL SULFATE 2.5; .5 MG/3ML; MG/3ML
3 SOLUTION RESPIRATORY (INHALATION)
Status: COMPLETED | OUTPATIENT
Start: 2022-09-26 | End: 2022-09-27

## 2022-09-26 RX ORDER — ONDANSETRON 2 MG/ML
4 INJECTION INTRAMUSCULAR; INTRAVENOUS
Status: COMPLETED | OUTPATIENT
Start: 2022-09-26 | End: 2022-09-26

## 2022-09-26 RX ORDER — IPRATROPIUM BROMIDE AND ALBUTEROL SULFATE 2.5; .5 MG/3ML; MG/3ML
3 SOLUTION RESPIRATORY (INHALATION)
Status: COMPLETED | OUTPATIENT
Start: 2022-09-26 | End: 2022-09-26

## 2022-09-26 RX ADMIN — PREDNISONE 60 MG: 20 TABLET ORAL at 07:09

## 2022-09-26 RX ADMIN — IPRATROPIUM BROMIDE AND ALBUTEROL SULFATE 3 ML: 2.5; .5 SOLUTION RESPIRATORY (INHALATION) at 07:09

## 2022-09-26 RX ADMIN — ONDANSETRON 4 MG: 2 INJECTION INTRAMUSCULAR; INTRAVENOUS at 07:09

## 2022-09-27 LAB
ANION GAP SERPL CALC-SCNC: 10 MMOL/L (ref 8–16)
BASOPHILS # BLD AUTO: 0.01 K/UL (ref 0–0.2)
BASOPHILS NFR BLD: 0.2 % (ref 0–1.9)
BUN SERPL-MCNC: 10 MG/DL (ref 6–20)
CALCIUM SERPL-MCNC: 8.7 MG/DL (ref 8.7–10.5)
CHLORIDE SERPL-SCNC: 89 MMOL/L (ref 95–110)
CO2 SERPL-SCNC: 36 MMOL/L (ref 23–29)
CREAT SERPL-MCNC: 0.9 MG/DL (ref 0.5–1.4)
DIFFERENTIAL METHOD: ABNORMAL
EOSINOPHIL # BLD AUTO: 0 K/UL (ref 0–0.5)
EOSINOPHIL NFR BLD: 0 % (ref 0–8)
ERYTHROCYTE [DISTWIDTH] IN BLOOD BY AUTOMATED COUNT: 22.3 % (ref 11.5–14.5)
EST. GFR  (NO RACE VARIABLE): >60 ML/MIN/1.73 M^2
GLUCOSE SERPL-MCNC: 268 MG/DL (ref 70–110)
HCT VFR BLD AUTO: 43.4 % (ref 40–54)
HGB BLD-MCNC: 12.9 G/DL (ref 14–18)
IMM GRANULOCYTES # BLD AUTO: 0.04 K/UL (ref 0–0.04)
IMM GRANULOCYTES NFR BLD AUTO: 0.7 % (ref 0–0.5)
LYMPHOCYTES # BLD AUTO: 0.5 K/UL (ref 1–4.8)
LYMPHOCYTES NFR BLD: 9.1 % (ref 18–48)
MAGNESIUM SERPL-MCNC: 1.8 MG/DL (ref 1.6–2.6)
MCH RBC QN AUTO: 23.6 PG (ref 27–31)
MCHC RBC AUTO-ENTMCNC: 29.7 G/DL (ref 32–36)
MCV RBC AUTO: 79 FL (ref 82–98)
MONOCYTES # BLD AUTO: 0.1 K/UL (ref 0.3–1)
MONOCYTES NFR BLD: 2.6 % (ref 4–15)
NEUTROPHILS # BLD AUTO: 4.8 K/UL (ref 1.8–7.7)
NEUTROPHILS NFR BLD: 87.4 % (ref 38–73)
NRBC BLD-RTO: 0 /100 WBC
PHOSPHATE SERPL-MCNC: 2.9 MG/DL (ref 2.7–4.5)
PLATELET # BLD AUTO: 304 K/UL (ref 150–450)
PMV BLD AUTO: 9.6 FL (ref 9.2–12.9)
POCT GLUCOSE: 114 MG/DL (ref 70–110)
POTASSIUM SERPL-SCNC: 4.7 MMOL/L (ref 3.5–5.1)
RBC # BLD AUTO: 5.47 M/UL (ref 4.6–6.2)
SODIUM SERPL-SCNC: 135 MMOL/L (ref 136–145)
TROPONIN I SERPL DL<=0.01 NG/ML-MCNC: 0.05 NG/ML (ref 0–0.03)
WBC # BLD AUTO: 5.48 K/UL (ref 3.9–12.7)

## 2022-09-27 PROCEDURE — 85025 COMPLETE CBC W/AUTO DIFF WBC: CPT | Performed by: INTERNAL MEDICINE

## 2022-09-27 PROCEDURE — 25000003 PHARM REV CODE 250: Performed by: INTERNAL MEDICINE

## 2022-09-27 PROCEDURE — 96365 THER/PROPH/DIAG IV INF INIT: CPT

## 2022-09-27 PROCEDURE — S4991 NICOTINE PATCH NONLEGEND: HCPCS | Performed by: INTERNAL MEDICINE

## 2022-09-27 PROCEDURE — 94761 N-INVAS EAR/PLS OXIMETRY MLT: CPT

## 2022-09-27 PROCEDURE — 25000242 PHARM REV CODE 250 ALT 637 W/ HCPCS: Performed by: STUDENT IN AN ORGANIZED HEALTH CARE EDUCATION/TRAINING PROGRAM

## 2022-09-27 PROCEDURE — 94660 CPAP INITIATION&MGMT: CPT

## 2022-09-27 PROCEDURE — 83735 ASSAY OF MAGNESIUM: CPT | Performed by: INTERNAL MEDICINE

## 2022-09-27 PROCEDURE — 99900035 HC TECH TIME PER 15 MIN (STAT)

## 2022-09-27 PROCEDURE — 36415 COLL VENOUS BLD VENIPUNCTURE: CPT | Performed by: INTERNAL MEDICINE

## 2022-09-27 PROCEDURE — G0378 HOSPITAL OBSERVATION PER HR: HCPCS

## 2022-09-27 PROCEDURE — 63600175 PHARM REV CODE 636 W HCPCS: Performed by: STUDENT IN AN ORGANIZED HEALTH CARE EDUCATION/TRAINING PROGRAM

## 2022-09-27 PROCEDURE — 63600175 PHARM REV CODE 636 W HCPCS: Performed by: INTERNAL MEDICINE

## 2022-09-27 PROCEDURE — 94640 AIRWAY INHALATION TREATMENT: CPT | Mod: XB

## 2022-09-27 PROCEDURE — 94640 AIRWAY INHALATION TREATMENT: CPT

## 2022-09-27 PROCEDURE — 27000221 HC OXYGEN, UP TO 24 HOURS

## 2022-09-27 PROCEDURE — 25000003 PHARM REV CODE 250: Performed by: STUDENT IN AN ORGANIZED HEALTH CARE EDUCATION/TRAINING PROGRAM

## 2022-09-27 PROCEDURE — 99220 PR INITIAL OBSERVATION CARE,LEVL III: ICD-10-PCS | Mod: ,,, | Performed by: INTERNAL MEDICINE

## 2022-09-27 PROCEDURE — 99220 PR INITIAL OBSERVATION CARE,LEVL III: CPT | Mod: ,,, | Performed by: INTERNAL MEDICINE

## 2022-09-27 PROCEDURE — 25000242 PHARM REV CODE 250 ALT 637 W/ HCPCS: Performed by: INTERNAL MEDICINE

## 2022-09-27 PROCEDURE — 99226 PR SUBSEQUENT OBSERVATION CARE,LEVEL III: CPT | Mod: ,,, | Performed by: FAMILY MEDICINE

## 2022-09-27 PROCEDURE — 80048 BASIC METABOLIC PNL TOTAL CA: CPT | Performed by: INTERNAL MEDICINE

## 2022-09-27 PROCEDURE — 99226 PR SUBSEQUENT OBSERVATION CARE,LEVEL III: ICD-10-PCS | Mod: ,,, | Performed by: FAMILY MEDICINE

## 2022-09-27 PROCEDURE — 84100 ASSAY OF PHOSPHORUS: CPT | Performed by: INTERNAL MEDICINE

## 2022-09-27 PROCEDURE — 96372 THER/PROPH/DIAG INJ SC/IM: CPT | Performed by: INTERNAL MEDICINE

## 2022-09-27 RX ORDER — DOXYCYCLINE HYCLATE 100 MG
100 TABLET ORAL EVERY 12 HOURS
Status: DISCONTINUED | OUTPATIENT
Start: 2022-09-27 | End: 2022-09-28 | Stop reason: HOSPADM

## 2022-09-27 RX ORDER — GUAIFENESIN 100 MG/5ML
200 SOLUTION ORAL EVERY 4 HOURS PRN
Status: DISCONTINUED | OUTPATIENT
Start: 2022-09-27 | End: 2022-09-28 | Stop reason: HOSPADM

## 2022-09-27 RX ORDER — POLYETHYLENE GLYCOL 3350 17 G/17G
17 POWDER, FOR SOLUTION ORAL 2 TIMES DAILY PRN
Status: DISCONTINUED | OUTPATIENT
Start: 2022-09-27 | End: 2022-09-28 | Stop reason: HOSPADM

## 2022-09-27 RX ORDER — METHOCARBAMOL 500 MG/1
500 TABLET, FILM COATED ORAL 4 TIMES DAILY PRN
Status: DISCONTINUED | OUTPATIENT
Start: 2022-09-27 | End: 2022-09-28 | Stop reason: HOSPADM

## 2022-09-27 RX ORDER — NICOTINE 7MG/24HR
1 PATCH, TRANSDERMAL 24 HOURS TRANSDERMAL DAILY
Status: DISCONTINUED | OUTPATIENT
Start: 2022-09-27 | End: 2022-09-28 | Stop reason: HOSPADM

## 2022-09-27 RX ORDER — NIFEDIPINE 30 MG/1
30 TABLET, EXTENDED RELEASE ORAL DAILY
Status: DISCONTINUED | OUTPATIENT
Start: 2022-09-27 | End: 2022-09-28 | Stop reason: HOSPADM

## 2022-09-27 RX ORDER — ONDANSETRON 8 MG/1
8 TABLET, ORALLY DISINTEGRATING ORAL EVERY 8 HOURS PRN
Status: DISCONTINUED | OUTPATIENT
Start: 2022-09-27 | End: 2022-09-28 | Stop reason: HOSPADM

## 2022-09-27 RX ORDER — METHOCARBAMOL 500 MG/1
500 TABLET, FILM COATED ORAL 4 TIMES DAILY PRN
Status: DISCONTINUED | OUTPATIENT
Start: 2022-09-27 | End: 2022-09-27

## 2022-09-27 RX ORDER — FUROSEMIDE 40 MG/1
40 TABLET ORAL DAILY
Status: DISCONTINUED | OUTPATIENT
Start: 2022-09-27 | End: 2022-09-28 | Stop reason: HOSPADM

## 2022-09-27 RX ORDER — AZITHROMYCIN 250 MG/1
250 TABLET, FILM COATED ORAL DAILY
Status: DISCONTINUED | OUTPATIENT
Start: 2022-09-28 | End: 2022-09-27

## 2022-09-27 RX ORDER — TALC
6 POWDER (GRAM) TOPICAL NIGHTLY PRN
Status: DISCONTINUED | OUTPATIENT
Start: 2022-09-27 | End: 2022-09-28 | Stop reason: HOSPADM

## 2022-09-27 RX ORDER — ACETAMINOPHEN 325 MG/1
650 TABLET ORAL EVERY 6 HOURS PRN
Status: DISCONTINUED | OUTPATIENT
Start: 2022-09-27 | End: 2022-09-28 | Stop reason: HOSPADM

## 2022-09-27 RX ORDER — FOLIC ACID 1 MG/1
1 TABLET ORAL DAILY
Status: DISCONTINUED | OUTPATIENT
Start: 2022-09-27 | End: 2022-09-28 | Stop reason: HOSPADM

## 2022-09-27 RX ORDER — LANOLIN ALCOHOL/MO/W.PET/CERES
1 CREAM (GRAM) TOPICAL DAILY
Refills: 1 | Status: DISCONTINUED | OUTPATIENT
Start: 2022-09-27 | End: 2022-09-28 | Stop reason: HOSPADM

## 2022-09-27 RX ORDER — FUROSEMIDE 10 MG/ML
40 INJECTION INTRAMUSCULAR; INTRAVENOUS ONCE
Status: DISCONTINUED | OUTPATIENT
Start: 2022-09-27 | End: 2022-09-27

## 2022-09-27 RX ORDER — IPRATROPIUM BROMIDE AND ALBUTEROL SULFATE 2.5; .5 MG/3ML; MG/3ML
3 SOLUTION RESPIRATORY (INHALATION) ONCE
Status: DISCONTINUED | OUTPATIENT
Start: 2022-09-27 | End: 2022-09-28 | Stop reason: HOSPADM

## 2022-09-27 RX ORDER — ENOXAPARIN SODIUM 100 MG/ML
40 INJECTION SUBCUTANEOUS EVERY 24 HOURS
Status: DISCONTINUED | OUTPATIENT
Start: 2022-09-27 | End: 2022-09-28 | Stop reason: HOSPADM

## 2022-09-27 RX ORDER — THIAMINE HCL 100 MG
100 TABLET ORAL DAILY
Status: DISCONTINUED | OUTPATIENT
Start: 2022-09-27 | End: 2022-09-28 | Stop reason: HOSPADM

## 2022-09-27 RX ORDER — PREDNISONE 20 MG/1
40 TABLET ORAL DAILY
Status: DISCONTINUED | OUTPATIENT
Start: 2022-09-27 | End: 2022-09-28 | Stop reason: HOSPADM

## 2022-09-27 RX ORDER — DOXYCYCLINE HYCLATE 100 MG
100 TABLET ORAL EVERY 12 HOURS
Status: DISCONTINUED | OUTPATIENT
Start: 2022-09-27 | End: 2022-09-27

## 2022-09-27 RX ORDER — AZITHROMYCIN 250 MG/1
250 TABLET, FILM COATED ORAL DAILY
Status: DISCONTINUED | OUTPATIENT
Start: 2022-09-27 | End: 2022-09-27

## 2022-09-27 RX ORDER — IPRATROPIUM BROMIDE AND ALBUTEROL SULFATE 2.5; .5 MG/3ML; MG/3ML
3 SOLUTION RESPIRATORY (INHALATION)
Status: DISCONTINUED | OUTPATIENT
Start: 2022-09-27 | End: 2022-09-28 | Stop reason: HOSPADM

## 2022-09-27 RX ORDER — AZITHROMYCIN 250 MG/1
500 TABLET, FILM COATED ORAL ONCE
Status: DISCONTINUED | OUTPATIENT
Start: 2022-09-27 | End: 2022-09-27

## 2022-09-27 RX ORDER — SODIUM CHLORIDE 0.9 % (FLUSH) 0.9 %
3 SYRINGE (ML) INJECTION
Status: DISCONTINUED | OUTPATIENT
Start: 2022-09-27 | End: 2022-09-28 | Stop reason: HOSPADM

## 2022-09-27 RX ADMIN — IPRATROPIUM BROMIDE AND ALBUTEROL SULFATE 3 ML: 2.5; .5 SOLUTION RESPIRATORY (INHALATION) at 08:09

## 2022-09-27 RX ADMIN — IPRATROPIUM BROMIDE AND ALBUTEROL SULFATE 3 ML: 2.5; .5 SOLUTION RESPIRATORY (INHALATION) at 04:09

## 2022-09-27 RX ADMIN — THERA TABS 1 TABLET: TAB at 09:09

## 2022-09-27 RX ADMIN — FUROSEMIDE 40 MG: 40 TABLET ORAL at 09:09

## 2022-09-27 RX ADMIN — PREDNISONE 40 MG: 20 TABLET ORAL at 09:09

## 2022-09-27 RX ADMIN — AZITHROMYCIN 500 MG: 500 INJECTION, POWDER, LYOPHILIZED, FOR SOLUTION INTRAVENOUS at 12:09

## 2022-09-27 RX ADMIN — FERROUS SULFATE TAB 325 MG (65 MG ELEMENTAL FE) 1 EACH: 325 (65 FE) TAB at 09:09

## 2022-09-27 RX ADMIN — Medication 100 MG: at 09:09

## 2022-09-27 RX ADMIN — NIFEDIPINE 30 MG: 30 TABLET, FILM COATED, EXTENDED RELEASE ORAL at 09:09

## 2022-09-27 RX ADMIN — IPRATROPIUM BROMIDE AND ALBUTEROL SULFATE 3 ML: 2.5; .5 SOLUTION RESPIRATORY (INHALATION) at 12:09

## 2022-09-27 RX ADMIN — DOXYCYCLINE HYCLATE 100 MG: 100 TABLET, COATED ORAL at 09:09

## 2022-09-27 RX ADMIN — METHOCARBAMOL 500 MG: 500 TABLET ORAL at 05:09

## 2022-09-27 RX ADMIN — ENOXAPARIN SODIUM 40 MG: 100 INJECTION SUBCUTANEOUS at 05:09

## 2022-09-27 RX ADMIN — ONDANSETRON 8 MG: 8 TABLET, ORALLY DISINTEGRATING ORAL at 03:09

## 2022-09-27 RX ADMIN — DOXYCYCLINE HYCLATE 100 MG: 100 TABLET, COATED ORAL at 08:09

## 2022-09-27 RX ADMIN — ACETAMINOPHEN 650 MG: 325 TABLET ORAL at 06:09

## 2022-09-27 RX ADMIN — FOLIC ACID 1 MG: 1 TABLET ORAL at 09:09

## 2022-09-27 RX ADMIN — IPRATROPIUM BROMIDE AND ALBUTEROL SULFATE 3 ML: 2.5; .5 SOLUTION RESPIRATORY (INHALATION) at 07:09

## 2022-09-27 RX ADMIN — NICOTINE 1 PATCH: 7 PATCH, EXTENDED RELEASE TRANSDERMAL at 09:09

## 2022-09-27 NOTE — SUBJECTIVE & OBJECTIVE
Interval History:   Seen and examined on home O2 now 5L sill has mild B/L wheezing, mild SOB and wheezing. Denies typical CP, no palpitation. Was on CPAP overnight which he tolerated well    Review of Systems   Constitutional:  Positive for activity change. Negative for appetite change, chills and fever.   HENT:  Positive for congestion.    Respiratory:  Positive for cough, shortness of breath and wheezing. Negative for chest tightness.    Gastrointestinal:  Negative for abdominal pain, constipation, diarrhea and nausea.   Endocrine: Negative for cold intolerance.   Genitourinary:  Negative for difficulty urinating and dysuria.   Musculoskeletal:  Negative for gait problem and joint swelling.   Skin:  Negative for rash.   Neurological:  Negative for dizziness, seizures, weakness, light-headedness, numbness and headaches.   Hematological:  Negative for adenopathy. Does not bruise/bleed easily.   Psychiatric/Behavioral:  Negative for agitation, confusion and hallucinations. The patient is nervous/anxious.    All other systems reviewed and are negative.  Objective:     Vital Signs (Most Recent):  Temp: 98.6 °F (37 °C) (09/27/22 0906)  Pulse: 79 (09/27/22 0906)  Resp: 17 (09/27/22 0906)  BP: (!) 151/76 (09/27/22 0906)  SpO2: (!) 93 % (09/27/22 0906)   Vital Signs (24h Range):  Temp:  [97.9 °F (36.6 °C)-99.7 °F (37.6 °C)] 98.6 °F (37 °C)  Pulse:  [] 79  Resp:  [16-31] 17  SpO2:  [85 %-100 %] 93 %  BP: (119-177)/(66-88) 151/76     Weight: 63.5 kg (140 lb)  Body mass index is 20.67 kg/m².    Intake/Output Summary (Last 24 hours) at 9/27/2022 1118  Last data filed at 9/27/2022 0903  Gross per 24 hour   Intake 120 ml   Output 350 ml   Net -230 ml      Physical Exam  Constitutional:       General: He is not in acute distress.     Appearance: He is not toxic-appearing.   HENT:      Head: Normocephalic and atraumatic.      Nose: No congestion.      Mouth/Throat:      Mouth: Mucous membranes are moist.      Pharynx: No  oropharyngeal exudate.   Eyes:      Pupils: Pupils are equal, round, and reactive to light.   Cardiovascular:      Rate and Rhythm: Normal rate. Rhythm irregular.      Heart sounds: No murmur heard.    No friction rub. No gallop.   Pulmonary:      Effort: Pulmonary effort is normal. No respiratory distress.      Breath sounds: Wheezing and rhonchi present.   Chest:      Chest wall: No tenderness.   Abdominal:      General: Bowel sounds are normal. There is no distension.      Palpations: Abdomen is soft.      Tenderness: There is no abdominal tenderness. There is no guarding.   Musculoskeletal:         General: No swelling or tenderness.      Cervical back: No tenderness.      Right lower leg: No edema.      Left lower leg: No edema.   Skin:     General: Skin is warm.      Findings: No erythema or rash.   Neurological:      General: No focal deficit present.      Mental Status: He is alert and oriented to person, place, and time.      Cranial Nerves: No cranial nerve deficit.      Motor: No weakness.      Gait: Gait normal.   Psychiatric:         Thought Content: Thought content normal.       Significant Labs: All pertinent labs within the past 24 hours have been reviewed.  Recent Lab Results  (Last 5 results in the past 24 hours)        09/27/22  0408   09/27/22  0407   09/26/22  2349   09/26/22  2030   09/26/22  2028        Anion Gap   10             Baso # 0.01               Basophil % 0.2               BUN   10             Calcium   8.7             Chloride   89             CO2   36             Creatinine   0.9             Differential Method Automated               eGFR   >60.0             Eos # 0.0               Eosinophil % 0.0               Glucose   268             Gran # (ANC) 4.8               Gran % 87.4               Hematocrit 43.4               Hemoglobin 12.9               Immature Grans (Abs) 0.04  Comment: Mild elevation in immature granulocytes is non specific and   can be seen in a variety of  conditions including stress response,   acute inflammation, trauma and pregnancy. Correlation with other   laboratory and clinical findings is essential.                 Immature Granulocytes 0.7               Lymph # 0.5               Lymph % 9.1               Magnesium   1.8             MCH 23.6               MCHC 29.7               MCV 79               Mono # 0.1               Mono % 2.6               MPV 9.6               nRBC 0               Phosphorus   2.9             Platelets 304               POC Glucose       107         POCT Glucose         107       Potassium   4.7             RBC 5.47               RDW 22.3               Sodium   135             Troponin I     0.046  Comment: The reference interval for Troponin I represents the 99th percentile   cutoff   for our facility and is consistent with 3rd generation assay   performance.             WBC 5.48                                      Significant Imaging: I have reviewed all pertinent imaging results/findings within the past 24 hours.

## 2022-09-27 NOTE — ASSESSMENT & PLAN NOTE
Patient with Hypoxic Respiratory failure which is Acute on chronic.  he is on home oxygen at 2 LPM. Supplemental oxygen was provided and noted-  .   Signs/symptoms of respiratory failure include- tachypnea, increased work of breathing and wheezing. Contributing diagnoses includes - CHF and COPD Labs and images were reviewed. Patient Has not had a recent ABG. Will treat underlying causes and adjust management of respiratory failure as follows- steroids, duonebs, doxy, per COPD pathway    Will get BNP as well, if elevated consider lasix

## 2022-09-27 NOTE — SUBJECTIVE & OBJECTIVE
Past Medical History:   Diagnosis Date    Asthma     Atrial fibrillation with rapid ventricular response     CHF (congestive heart failure)     COPD (chronic obstructive pulmonary disease)     home O2 at night only    Coronary artery disease     Hypertension     Lung nodule     Pneumonia     Seizures        Past Surgical History:   Procedure Laterality Date    ESOPHAGOGASTRODUODENOSCOPY N/A 2/11/2022    Procedure: EGD (ESOPHAGOGASTRODUODENOSCOPY);  Surgeon: Cain Ortega MD;  Location: [facility] ENDO (2ND FLR);  Service: Endoscopy;  Laterality: N/A;    ESOPHAGOGASTRODUODENOSCOPY N/A 9/15/2022    Procedure: EGD (ESOPHAGOGASTRODUODENOSCOPY);  Surgeon: Leonid Chavez MD;  Location: [facility] ENDO (2ND FLR);  Service: Endoscopy;  Laterality: N/A;  PA-50 - 2nd floor  vacc-wears 2L o2-inst mail and verbal-clears 4 hrs prior-tb  8/30 pt rescheduled; updated instructions mailed-st    LEFT HEART CATHETERIZATION  4/23/2020    Procedure: Left heart cath;  Surgeon: Nic Pollack MD;  Location: [facility] CATH LAB;  Service: Cardiology;;       Review of patient's allergies indicates:   Allergen Reactions    Benazepril Swelling       No current facility-administered medications on file prior to encounter.     Current Outpatient Medications on File Prior to Encounter   Medication Sig    albuterol (PROVENTIL/VENTOLIN HFA) 90 mcg/actuation inhaler Inhale 1-2 puffs into the lungs every 6 (six) hours as needed for Wheezing. Rescue    albuterol-ipratropium (DUO-NEB) 2.5 mg-0.5 mg/3 mL nebulizer solution Take 3 mLs by nebulization every 4 (four) hours as needed for Wheezing or Shortness of Breath. Rescue    budesonide-glycopyr-formoterol (BREZTRI AEROSPHERE) 160-9-4.8 mcg/actuation HFAA Inhale 2 puffs into the lungs 2 (two) times daily. Rinse mouth after use. CONTROL INHALER    ferrous sulfate 325 (65 FE) MG EC tablet Take 1 tablet (325 mg total) by mouth once daily.    folic acid (FOLVITE) 1 MG tablet Take 1 tablet (1 mg total) by mouth once  daily.    furosemide (LASIX) 20 MG tablet Take 2 tablets (40 mg total) by mouth once daily.    ibuprofen (ADVIL,MOTRIN) 200 MG tablet Take 800 mg by mouth daily as needed for Pain.    multivitamin (THERAGRAN) tablet Take 1 tablet by mouth once daily.    nicotine (NICODERM CQ) 7 mg/24 hr Place 1 patch onto the skin once daily.    NIFEdipine (PROCARDIA-XL) 30 MG (OSM) 24 hr tablet Take 1 tablet (30 mg total) by mouth once daily.    roflumilast (DALIRESP) 500 mcg Tab Take 500 mcg by mouth once daily.    thiamine 100 MG tablet Take 1 tablet (100 mg total) by mouth once daily.    [DISCONTINUED] tiotropium bromide (SPIRIVA RESPIMAT) 2.5 mcg/actuation inhaler Inhale 2 puffs into the lungs Daily. Controller     Family History       Problem Relation (Age of Onset)    Cancer Father    Hypertension Sister    Stroke Sister, Brother          Tobacco Use    Smoking status: Some Days     Packs/day: 0.25     Types: Cigarettes    Smokeless tobacco: Never    Tobacco comments:     1-2 per day   Substance and Sexual Activity    Alcohol use: Yes     Alcohol/week: 2.0 standard drinks     Types: 2 Cans of beer per week     Comment: every night    Drug use: No    Sexual activity: Not Currently     Review of Systems   Constitutional:  Negative for activity change, appetite change, chills and fever.   HENT:  Positive for congestion. Negative for hearing loss and rhinorrhea.    Eyes:  Negative for discharge, itching and visual disturbance.   Respiratory:  Positive for cough, chest tightness and shortness of breath. Negative for apnea.    Cardiovascular:  Negative for chest pain, palpitations and leg swelling.   Gastrointestinal:  Positive for abdominal pain. Negative for abdominal distention, constipation, diarrhea, nausea and vomiting.   Endocrine: Negative for cold intolerance and heat intolerance.   Genitourinary:  Negative for dysuria and hematuria.   Musculoskeletal:  Negative for back pain, neck pain and neck stiffness.   Skin:   Negative for rash and wound.   Neurological:  Negative for dizziness, seizures, light-headedness and headaches.   Psychiatric/Behavioral:  Negative for agitation, confusion and suicidal ideas.    Objective:     Vital Signs (Most Recent):  Temp: 99.7 °F (37.6 °C) (09/26/22 1833)  Pulse: 98 (09/27/22 0031)  Resp: (!) 22 (09/27/22 0031)  BP: 124/73 (09/27/22 0031)  SpO2: 100 % (09/27/22 0031)   Vital Signs (24h Range):  Temp:  [99.7 °F (37.6 °C)] 99.7 °F (37.6 °C)  Pulse:  [] 98  Resp:  [22-31] 22  SpO2:  [100 %] 100 %  BP: (124-177)/(66-88) 124/73     Weight: 63.5 kg (140 lb)  Body mass index is 20.67 kg/m².    Physical Exam  Vitals reviewed.   Constitutional:       General: He is not in acute distress.     Appearance: He is well-developed.   HENT:      Head: Normocephalic and atraumatic.      Nose: Nose normal. No rhinorrhea.      Mouth/Throat:      Mouth: Mucous membranes are moist.   Eyes:      General: No scleral icterus.        Right eye: No discharge.         Left eye: No discharge.      Pupils: Pupils are equal, round, and reactive to light.   Neck:      Vascular: No JVD.   Cardiovascular:      Rate and Rhythm: Normal rate and regular rhythm.      Heart sounds: Normal heart sounds. No murmur heard.    No friction rub.   Pulmonary:      Effort: Pulmonary effort is normal. No respiratory distress.      Breath sounds: Wheezing and rhonchi present.   Abdominal:      General: Bowel sounds are normal. There is no distension.      Palpations: Abdomen is soft.      Tenderness: There is no abdominal tenderness.   Musculoskeletal:         General: No deformity. Normal range of motion.      Cervical back: Normal range of motion and neck supple.   Skin:     General: Skin is warm and dry.   Neurological:      General: No focal deficit present.      Mental Status: He is alert and oriented to person, place, and time.   Psychiatric:         Mood and Affect: Mood normal.         Behavior: Behavior normal.         CRANIAL  NERVES     CN III, IV, VI   Pupils are equal, round, and reactive to light.     Significant Labs: All pertinent labs within the past 24 hours have been reviewed.  CBC:   Recent Labs   Lab 09/26/22 1913   WBC 12.54   HGB 13.1*   HCT 42.6        CMP:   Recent Labs   Lab 09/26/22 1913   *   K 3.9   CL 90*   CO2 32*   GLU 67*   BUN 11   CREATININE 0.7   CALCIUM 8.8   PROT 6.9   ALBUMIN 3.1*   BILITOT 0.8   ALKPHOS 86   AST 15   ALT 8*   ANIONGAP 12     Troponin:   Recent Labs   Lab 09/26/22 1913 09/26/22  2349   TROPONINI 0.044* 0.046*       Significant Imaging: I have reviewed all pertinent imaging results/findings within the past 24 hours.

## 2022-09-27 NOTE — CARE UPDATE
RAPID RESPONSE NURSE PROACTIVE ROUNDING NOTE       Time of Visit: 515    Admit Date: 2022  LOS: 0  Code Status: Full Code   Date of Visit: 2022  : 1960  Age: 62 y.o.  Sex: male  Race: Black or   Bed: 13259/76922 A:   MRN: [id number]  Was the patient discharged from an ICU this admission? No   Was the patient discharged from a PACU within last 24 hours? No   Did the patient receive conscious sedation/general anesthesia in last 24 hours? No  Was the patient in the ED within the past 24 hours? Yes  Was the patient on NIPPV within the past 24 hours? No   Attending Physician: Bear Gomes MD  Primary Service: [facility]   Time spent at the bedside: < 15 min    SITUATION    Notified by charge RN via phone call.  Reason for alert: increasing O2 demand  Called to evaluate the patient for Respiratory    BACKGROUND     Why is the patient in the hospital?: Acute on chronic respiratory failure with hypoxia and hypercapnia    Patient has a past medical history of Asthma, Atrial fibrillation with rapid ventricular response, CHF (congestive heart failure), COPD (chronic obstructive pulmonary disease), Coronary artery disease, Hypertension, Lung nodule, Pneumonia, and Seizures.    Last Vitals:  Temp: 97.9 °F (36.6 °C) ( 0500)  Pulse: 92 ( 0500)  Resp: 20 ( 0500)  BP: 119/71 ( 0500)  SpO2: 85 % ( 0500)    24 Hours Vitals Range:  Temp:  [97.9 °F (36.6 °C)-99.7 °F (37.6 °C)]   Pulse:  []   Resp:  [20-31]   BP: (119-177)/(66-88)   SpO2:  [85 %-100 %]     Labs:  Recent Labs     22   WBC 12.54   HGB 13.1*   HCT 42.6          Recent Labs     22  0407   * 135*   K 3.9 4.7   CL 90* 89*   CO2 32* 36*   CREATININE 0.7 0.9   GLU 67* 268*   PHOS  --  2.9   MG  --  1.8        No results for input(s): PH, PCO2, PO2, HCO3, POCSATURATED, BE in the last 72 hours.     ASSESSMENT    Physical Exam  Constitutional:       General: He is  sleeping.   Cardiovascular:      Rate and Rhythm: Normal rate and regular rhythm.      Pulses: Normal pulses.   Pulmonary:      Effort: Pulmonary effort is normal.      Breath sounds: Wheezing present.      Comments: Wet-sounding cough with slight expiratory wheeze  Musculoskeletal:      Right lower le+ Edema present.      Left lower le+ Edema present.   Skin:     General: Skin is warm and moist.      Capillary Refill: Capillary refill takes 2 to 3 seconds.   Neurological:      Mental Status: He is easily aroused.     Called by charge RN for concern for patient requiring increasing amounts of oxygen to maintain SpO2, sats still in mid-80s    Upon arrival to floor patient sleeping comfortably in bed, no obvious signs of distress. SpO2 100% on 5L NC.    INTERVENTIONS    The patient was seen for Respiratory problem. Staff concerns included increased oxygen requirements. The following interventions were performed: CPAP and albuterol-ipratropium (DUO-NEB) 0.5-3 mg/ 3 ml nebulizer for wheezing.    One time duo-neb ordered  CPAP QHS also ordered d/t patient history of COPD and CHF    Oxygen titrated down to 2L nasal cannula, pt O2 sat 97% on the 2L    RECOMMENDATIONS    Titrate oxygen as necessary to maintain SpO2 greater than 88%  Encourage CPAP use at night    PROVIDER ESCALATION    Yes/No  yes    Orders received and case discussed with Dr. Pickett .    Disposition: Remain in room 07767.    FOLLOW-UP    charge RNMalathi  updated on plan of care. Instructed to call the Rapid Response Nurse, Candie Colin RN at [phone] for additional questions or concerns.

## 2022-09-27 NOTE — PROGRESS NOTES
Notified on call Provider Dr. Pickett that the patient has been requiring 4 Liters Nasal Cannula in order to maintain O2 sats of 88-90%. Patient desats to low 80's when tried to wean down to 2 Liters nasal cannula by respiratory therapist since he is a know COPD patient. New orders have been placed for CPAP at night and IV furosemide. Pt denies any shortness of breath at this time. No acute distress noted. Will continue to monitor.

## 2022-09-27 NOTE — HOSPITAL COURSE
Presented with dyspnea. He was started on duonebs, steroids and oxygen. He was negative for flu and covid negative, CXR without acute process, troponin elevated but stable and near baseline. Admited for COPD exacerbation.  He improved with nebs, steroids and doxycycline. Initially required increased o2 up to 4 lpm from baseline 2 lpm but was able to wean back down to home o2.  He felt near baseline and was amenable to discharge. He was referred to pulmonology, COPD f/u clinic and PCP.

## 2022-09-27 NOTE — ED TRIAGE NOTES
Baltazar Mccray, a 62 y.o. male presents to the ED w/ complaint of SOB x 3-4 days,using home neb treatments without relief. Hx COPD, wears 2L baseline. Pt denies CP. States coughing up green sputum, HA and N/V, 2 episodes of emesis this morning.       Triage note:  Chief Complaint   Patient presents with    Shortness of Breath     Hx of COPD, home O2, nebs at home not working, cough    Cough     Review of patient's allergies indicates:   Allergen Reactions    Benazepril Swelling     Past Medical History:   Diagnosis Date    Asthma     Atrial fibrillation with rapid ventricular response     CHF (congestive heart failure)     COPD (chronic obstructive pulmonary disease)     home O2 at night only    Coronary artery disease     Hypertension     Lung nodule     Pneumonia     Seizures

## 2022-09-27 NOTE — ASSESSMENT & PLAN NOTE
Smoke on and off and last cigarette smoking prior getting sick  Counseling given  Will have nicotine patches if needed

## 2022-09-27 NOTE — ASSESSMENT & PLAN NOTE
Acute COPD exacerbation  pathway initiated as above  Cont steroids, duonebx, resp ccare  Incentive spirometry

## 2022-09-27 NOTE — PLAN OF CARE
Problem: Adjustment to Illness COPD (Chronic Obstructive Pulmonary Disease)  Goal: Optimal Chronic Illness Coping  Intervention: Support and Optimize Psychosocial Response  Flowsheets (Taken 9/27/2022 0043)  Supportive Measures: self-care encouraged     Problem: Infection COPD (Chronic Obstructive Pulmonary Disease)  Goal: Absence of Infection Signs and Symptoms  Outcome: Ongoing, Progressing     Problem: Respiratory Compromise COPD (Chronic Obstructive Pulmonary Disease)  Goal: Effective Oxygenation and Ventilation  Outcome: Ongoing, Progressing     COPD: Pt. on 2 L O2-sats WDL. Getting Nebs.   Pt c/o nausea-Zofran given. Improved.   Pt. c/o cramps in hands. MD made aware. Order for Robaxin received and given.   Vitals stable. Safety and care rounds maintained. Wctm.

## 2022-09-27 NOTE — ASSESSMENT & PLAN NOTE
Chronic, controlled, troponin elevation now stable no complaints of chest pain  Chest tightness improved after breathing treatments

## 2022-09-27 NOTE — PLAN OF CARE
[facility] ([facility]  Initial Discharge Assessment       Primary Care Provider: [facility]    Admission Diagnosis: SOB (shortness of breath) [R06.02]  COPD exacerbation [J44.1]    Admission Date: 9/26/2022  Expected Discharge Date:          Payor: HUMA[employer] MANAGED MEDICARE / Plan: HUMANA MEDICARE [facility] / Product Type: Medicare Advantage /     Extended Emergency Contact Information  Primary Emergency Contact: Gladis Mccray   [place]  Mobile Phone: [phone]  Relation: Sister  Secondary Emergency Contact: Viry Mccray   [place]  Mobile Phone: [phone]  Relation: Sister    Discharge Plan A: (P) Home         [facility]  [address]  [place] [address]  Phone: [phone] Fax: [phone]    [facility] "[facility] ST[id number] - [place] - [address] AT [facility] [address]  [address] [place] [address]  Phone: [phone] Fax: [phone]      Initial Assessment (most recent)       Adult Discharge Assessment - 09/27/22 1532          Discharge Assessment    Assessment Type Discharge Planning Assessment     Confirmed/corrected address, phone number and insurance Yes (P)      Confirmed Demographics Correct on Facesheet (P)      Source of Information patient (P)      Reason For Admission copd (P)      Lives With alone (P)      Do you expect to return to your current living situation? Yes (P)      Do you have help at home or someone to help you manage your care at home? No (P)      Prior to hospitilization cognitive status: Alert/Oriented (P)      Current cognitive status: Alert/Oriented (P)      Walking or Climbing Stairs Difficulty none (P)      Dressing/Bathing Difficulty none (P)      Equipment Currently Used at Home nebulizer;BIPAP;oxygen (P)      Readmission within 30 days? Yes (P)      Patient currently being followed by outpatient case management? No (P)      Do you  currently have service(s) that help you manage your care at home? No (P)      Do you take prescription medications? Yes (P)      Do you have prescription coverage? Yes (P)      Who is going to help you get home at discharge? insurance transport for o2 (P)      How do you get to doctors appointments? health plan transportation (P)      Are you on dialysis? No (P)      Do you take coumadin? No (P)      Discharge Plan A Home (P)      DME Needed Upon Discharge  none (P)                       Pt states he had a nurse that came out a couple months ago but nobody since then he has home o2 nebulizer and bipap also has transport tanks but does not have one here will need a transport tank home or will need a w/c transport for o2

## 2022-09-27 NOTE — HPI
61 yo male with COPD on 2L, HTN, tobacco/alcohol use presenting for shortness of breath and cough since yesterday. Symptoms started yesterday and have progressively gotten worse to where he cant breath very well. HE tried nebs at home but that did not work and prompted him to come in for evaluation. He had some chest tightness, congestion and abdominal pain that he associated with his shortness of breath. He has not been around any one that is sick, he states he quit smoking about one week ago. He had a similar presentation earlier this month, and some before that as well.    In Ed, started on duonebs, steroids and oxygen,flu negative, CXR without acute process, troponin elevated but stable and near baseline, medicine called for admission for COPD exacerbation.

## 2022-09-27 NOTE — ASSESSMENT & PLAN NOTE
Patient with Long standing persistent (>12 months) atrial fibrillation which is controlled currently with consider B-blocker. Patient is currently in atrial fibrillation.[id number] Score: 1. HASBLED Score: Unable to calculate. Anticoagulation not indicated due to respond well.

## 2022-09-27 NOTE — PROGRESS NOTES
[facility] - Int[facility] ([address] Medicine  Progress Note    Patient Name: Baltazar Mccray  MRN: [id number]  Patient Class: OP- Observation   Admission Date: 9/26/2022  Length of Stay: 0 days  Attending Physician: Bear Gomes MD  Primary Care Provider: Rhonda Of Katia        Subjective:     Principal Problem:Acute on chronic respiratory failure with hypoxia and hypercapnia        HPI:  61 yo male with COPD on 2L, HTN, tobacco/alcohol use presenting for shortness of breath and cough since yesterday. Symptoms started yesterday and have progressively gotten worse to where he cant breath very well. HE tried nebs at home but that did not work and prompted him to come in for evaluation. He had some chest tightness, congestion and abdominal pain that he associated with his shortness of breath. He has not been around any one that is sick, he states he quit smoking about one week ago. He had a similar presentation earlier this month, and some before that as well.    In Ed, started on duonebs, steroids and oxygen,flu negative, CXR without acute process, troponin elevated but stable and near baseline, medicine called for admission for COPD exacerbation.     Overview/Hospital Course:  n Ed, started on duonebs, steroids and oxygen,flu negative, CXR without acute process, troponin elevated but stable and near baseline, medicine called for admission for COPD exacerbation.     Interval History:   Seen and examined on home O2 now 5L sill has mild B/L wheezing, mild SOB and wheezing. Denies typical CP, no palpitation. Was on CPAP overnight which he tolerated well    Review of Systems   Constitutional:  Positive for activity change. Negative for appetite change, chills and fever.   HENT:  Positive for congestion.    Respiratory:  Positive for cough, shortness of breath and wheezing. Negative for chest tightness.    Gastrointestinal:  Negative for abdominal pain, constipation, diarrhea and nausea.    Endocrine: Negative for cold intolerance.   Genitourinary:  Negative for difficulty urinating and dysuria.   Musculoskeletal:  Negative for gait problem and joint swelling.   Skin:  Negative for rash.   Neurological:  Negative for dizziness, seizures, weakness, light-headedness, numbness and headaches.   Hematological:  Negative for adenopathy. Does not bruise/bleed easily.   Psychiatric/Behavioral:  Negative for agitation, confusion and hallucinations. The patient is nervous/anxious.    All other systems reviewed and are negative.  Objective:     Vital Signs (Most Recent):  Temp: 98.6 °F (37 °C) (09/27/22 0906)  Pulse: 79 (09/27/22 0906)  Resp: 17 (09/27/22 0906)  BP: (!) 151/76 (09/27/22 0906)  SpO2: (!) 93 % (09/27/22 0906)   Vital Signs (24h Range):  Temp:  [97.9 °F (36.6 °C)-99.7 °F (37.6 °C)] 98.6 °F (37 °C)  Pulse:  [] 79  Resp:  [16-31] 17  SpO2:  [85 %-100 %] 93 %  BP: (119-177)/(66-88) 151/76     Weight: 63.5 kg (140 lb)  Body mass index is 20.67 kg/m².    Intake/Output Summary (Last 24 hours) at 9/27/2022 1118  Last data filed at 9/27/2022 0903  Gross per 24 hour   Intake 120 ml   Output 350 ml   Net -230 ml      Physical Exam  Constitutional:       General: He is not in acute distress.     Appearance: He is not toxic-appearing.   HENT:      Head: Normocephalic and atraumatic.      Nose: No congestion.      Mouth/Throat:      Mouth: Mucous membranes are moist.      Pharynx: No oropharyngeal exudate.   Eyes:      Pupils: Pupils are equal, round, and reactive to light.   Cardiovascular:      Rate and Rhythm: Normal rate. Rhythm irregular.      Heart sounds: No murmur heard.    No friction rub. No gallop.   Pulmonary:      Effort: Pulmonary effort is normal. No respiratory distress.      Breath sounds: Wheezing and rhonchi present.   Chest:      Chest wall: No tenderness.   Abdominal:      General: Bowel sounds are normal. There is no distension.      Palpations: Abdomen is soft.      Tenderness:  There is no abdominal tenderness. There is no guarding.   Musculoskeletal:         General: No swelling or tenderness.      Cervical back: No tenderness.      Right lower leg: No edema.      Left lower leg: No edema.   Skin:     General: Skin is warm.      Findings: No erythema or rash.   Neurological:      General: No focal deficit present.      Mental Status: He is alert and oriented to person, place, and time.      Cranial Nerves: No cranial nerve deficit.      Motor: No weakness.      Gait: Gait normal.   Psychiatric:         Thought Content: Thought content normal.       Significant Labs: All pertinent labs within the past 24 hours have been reviewed.  Recent Lab Results  (Last 5 results in the past 24 hours)        09/27/22  0408   09/27/22  0407   09/26/22  2349   09/26/22  2030   09/26/22 2028        Anion Gap   10             Baso # 0.01               Basophil % 0.2               BUN   10             Calcium   8.7             Chloride   89             CO2   36             Creatinine   0.9             Differential Method Automated               eGFR   >60.0             Eos # 0.0               Eosinophil % 0.0               Glucose   268             Gran # (ANC) 4.8               Gran % 87.4               Hematocrit 43.4               Hemoglobin 12.9               Immature Grans (Abs) 0.04  Comment: Mild elevation in immature granulocytes is non specific and   can be seen in a variety of conditions including stress response,   acute inflammation, trauma and pregnancy. Correlation with other   laboratory and clinical findings is essential.                 Immature Granulocytes 0.7               Lymph # 0.5               Lymph % 9.1               Magnesium   1.8             MCH 23.6               MCHC 29.7               MCV 79               Mono # 0.1               Mono % 2.6               MPV 9.6               nRBC 0               Phosphorus   2.9             Platelets 304               POC Glucose        107         POCT Glucose         107       Potassium   4.7             RBC 5.47               RDW 22.3               Sodium   135             Troponin I     0.046  Comment: The reference interval for Troponin I represents the 99th percentile   cutoff   for our facility and is consistent with 3rd generation assay   performance.             WBC 5.48                                      Significant Imaging: I have reviewed all pertinent imaging results/findings within the past 24 hours.    Assessment/Plan:      * Acute on chronic respiratory failure with hypoxia and hypercapnia  Improved  Patient with Hypoxic Respiratory failure which is Acute on chronic.  he is on home oxygen at 2 LPM. Supplemental oxygen was provided and noted- Oxygen Concentration (%):  [32] 32.   Signs/symptoms of respiratory failure include- tachypnea, increased work of breathing and wheezing. Contributing diagnoses includes - CHF and COPD Labs and images were reviewed. Patient Has not had a recent ABG. Will treat underlying causes and adjust management of respiratory failure as follows- steroids, duonebs, doxy, per COPD pathway    Will get BNP as well, if elevated consider lasix    Acute bronchitis  Vs Acute COPD exacerbation  Cont doxycline and monitor      Atrial fibrillation  Patient with Long standing persistent (>12 months) atrial fibrillation which is controlled currently with consider B-blocker. Patient is currently in atrial fibrillation.[id number] Score: 1. HASBLED Score: Unable to calculate. Anticoagulation not indicated due to respond well.        Alcohol use disorder, mild, abuse  Will monitor on CIWA  Can start PRNs if needed      Chronic obstructive pulmonary disease  Acute COPD exacerbation  pathway initiated as above  Cont steroids, duonebx, resp ccare  Incentive spirometry      Nonobstructive atherosclerosis of coronary artery  Chronic, controlled, troponin elevation now stable no complaints of chest pain  Chest tightness  improved after breathing treatments      Chronic diastolic congestive heart failure  Chronic, diastolic dysfunction, will get BNP, consider lasix if elevated      Tobacco abuse  Smoke on and off and last cigarette smoking prior getting sick  Counseling given  Will have nicotine patches if needed      Essential hypertension  Chronic, controlled, continue home medications        VTE Risk Mitigation (From admission, onward)           Ordered     enoxaparin injection 40 mg  Daily         09/27/22 0016     IP VTE HIGH RISK PATIENT  Once         09/27/22 0016     Place sequential compression device  Until discontinued         09/27/22 0016                    Discharge Planning   RUTH:      Code Status: Full Code   Is the patient medically ready for discharge?:     Reason for patient still in hospital (select all that apply): Patient trending condition and Treatment                     Bear Gomes MD  Department of Hospital Medicine   [facility] - Intensive Care ([place]-)

## 2022-09-27 NOTE — ED PROVIDER NOTES
Encounter Date: 9/26/2022       History     Chief Complaint   Patient presents with    Shortness of Breath     Hx of COPD, home O2, nebs at home not working, cough    Cough     HPI  Patient is a 62-year-old male with history of COPD on 2 L supplemental oxygen at baseline who presents for shortness of breath, cough, congestion and chest pressure.  Patient states that he has been feeling unwell since yesterday.  He tells me that his shortness of breath has progressively gotten worse today and he feels like he can not breathe.  He has been taking home albuterol treatments without improvement.  He states that his chest feels tight but denies substernal chest pain.  No fevers or chills.  No abdominal pain, vomiting, diarrhea, dysuria.  No known sick contacts.    Review of patient's allergies indicates:   Allergen Reactions    Benazepril Swelling     Past Medical History:   Diagnosis Date    Asthma     Atrial fibrillation with rapid ventricular response     CHF (congestive heart failure)     COPD (chronic obstructive pulmonary disease)     home O2 at night only    Coronary artery disease     Hypertension     Lung nodule     Pneumonia     Seizures      Past Surgical History:   Procedure Laterality Date    ESOPHAGOGASTRODUODENOSCOPY N/A 2/11/2022    Procedure: EGD (ESOPHAGOGASTRODUODENOSCOPY);  Surgeon: Cain Ortega MD;  Location: [address]);  Service: Endoscopy;  Laterality: N/A;    ESOPHAGOGASTRODUODENOSCOPY N/A 9/15/2022    Procedure: EGD (ESOPHAGOGASTRODUODENOSCOPY);  Surgeon: Leonid Chavez MD;  Location: [address]);  Service: Endoscopy;  Laterality: N/A;  PA-50 - 2nd floor  vacc-wears 2L o2-inst mail and verbal-clears 4 hrs prior-tb  8/30 pt rescheduled; updated instructions mailed-st    LEFT HEART CATHETERIZATION  4/23/2020    Procedure: Left heart cath;  Surgeon: Nic Pollack MD;  Location: [facility] CATH LAB;  Service: Cardiology;;     Family History   Problem Relation Age of Onset    Cancer  Father     Hypertension Sister     Stroke Sister     Stroke Brother     Diabetes Neg Hx     Heart disease Neg Hx      Social History     Tobacco Use    Smoking status: Some Days     Packs/day: 0.25     Types: Cigarettes    Smokeless tobacco: Never    Tobacco comments:     1-2 per day   Substance Use Topics    Alcohol use: Yes     Alcohol/week: 2.0 standard drinks     Types: 2 Cans of beer per week     Comment: every night    Drug use: No     Review of Systems  Constitutional: No fever  HENT: No sore throat, positive congestion  Eyes: No eye pain  Respiratory:  Positive cough, positive shortness of breath  Cardiovascular:  Positive chest tightness  Gastrointestinal: No abdominal pain, no vomiting, no diarrhea  Genitourinary: No dysuria  Musculoskeletal: No back pain  Neurological: No headache  Psychiatric: No agitation     Physical Exam     Initial Vitals   BP Pulse Resp Temp SpO2   09/26/22 1730 09/26/22 1730 09/26/22 1730 09/26/22 1833 09/26/22 1730   (!) 144/88 (!) 116 (!) 26 99.7 °F (37.6 °C) 100 %      MAP       --                Physical Exam  Constitutional:  Mild distress  HENT:  Nares patent, clear rhinorrhea present  Respiratory:  Mildly tachypneic, lungs coarse with scattered wheezes, on 3 L nasal cannula  Cardiovascular: Well perfused, tachycardic, regular rhythm  Gastrointestinal: Soft, non-tender, non-distended  Integumentary: Warm and dry  Musculoskeletal: No deformity  Neurological: Awake and alert  Psychiatric: Cooperative     ED Course   Procedures  Labs Reviewed   CBC W/ AUTO DIFFERENTIAL - Abnormal; Notable for the following components:       Result Value    Hemoglobin 13.1 (*)     MCV 77 (*)     MCH 23.8 (*)     MCHC 30.8 (*)     RDW 22.7 (*)     MPV 8.8 (*)     Immature Granulocytes 0.6 (*)     Gran # (ANC) 8.3 (*)     Immature Grans (Abs) 0.08 (*)     Mono # 1.4 (*)     All other components within normal limits   COMPREHENSIVE METABOLIC PANEL - Abnormal; Notable for the following components:     Sodium 134 (*)     Chloride 90 (*)     CO2 32 (*)     Glucose 67 (*)     Albumin 3.1 (*)     ALT 8 (*)     All other components within normal limits   TROPONIN I - Abnormal; Notable for the following components:    Troponin I 0.044 (*)     All other components within normal limits   LIPASE   TROPONIN I   POCT INFLUENZA A/B MOLECULAR   POCT GLUCOSE     EKG Readings: (Independently Interpreted)   EKG with sinus tach, rate 108, no STEMI.      Imaging Results              X-Ray Chest AP Portable (Final result)  Result time 09/26/22 20:15:22      Final result by Antony Samayoa MD (09/26/22 20:15:22)                   Impression:      Chronic findings as above, similar to prior.    No significant change from prior study.      Electronically signed by: Antony Samayoa MD  Date:    09/26/2022  Time:    20:15               Narrative:    EXAMINATION:  XR CHEST AP PORTABLE    CLINICAL HISTORY:  Asthma;    TECHNIQUE:  Single frontal view of the chest was performed.    COMPARISON:  09/11/2022.    FINDINGS:  Hyperinflated lungs with emphysematous architecture.    Chronic increased markings in the lungs, similar to prior.    Stable retrocardiac nodule left lower lobe, better seen on CTA chest 08/11/2022.    Heart and lungs  appear unchanged when allowing for differences in technique and positioning.                                       Medications   albuterol-ipratropium 2.5 mg-0.5 mg/3 mL nebulizer solution 3 mL (has no administration in time range)   azithromycin 500 mg in dextrose 5 % 250 mL IVPB (ready to mix system) (has no administration in time range)   albuterol-ipratropium 2.5 mg-0.5 mg/3 mL nebulizer solution 3 mL (3 mLs Nebulization Given 9/26/22 1950)   predniSONE tablet 60 mg (60 mg Oral Given 9/26/22 1916)   ondansetron injection 4 mg (4 mg Intravenous Given 9/26/22 1915)     Medical Decision Making:   History:   Old Records Summarized: records from clinic visits and records from previous admission(s).  Clinical  Tests:   Lab Tests: Ordered and Reviewed  Radiological Study: Ordered and Reviewed  Medical Tests: Ordered and Reviewed  ED Management:  Patient here with shortness of breath, cough and congestion.  He is normally on 2 L supplemental oxygen at baseline but is requiring 3 L currently.  His lungs are coarse bilaterally with scattered wheezing.  Suspect COPD exacerbation.  Chest x-ray without focal consolidation or pneumonia.  He was treated with nebs, steroids and azithromycin.  Patient still reporting some shortness of breath after initial breathing treatments.  Lungs are still coarse and wheezy, patient is stable on 3 L nasal cannula.  Additional breathing treatments ordered.  Patient admitted to hospitalist Medicine for COPD exacerbation.  Other:   I have discussed this case with another health care provider.           ED Course as of 09/27/22 0020   Mon Sep 26, 2022   2251 Glucose(!): 67 [NN]   2251 POCT Glucose: 107 [NN]      ED Course User Index  [NN] Crystal Kuhn MD                 Clinical Impression:   Final diagnoses:  [R06.02] SOB (shortness of breath)               Crystal Kuhn MD  09/27/22 0024

## 2022-09-27 NOTE — ASSESSMENT & PLAN NOTE
Improved  Patient with Hypoxic Respiratory failure which is Acute on chronic.  he is on home oxygen at 2 LPM. Supplemental oxygen was provided and noted- Oxygen Concentration (%):  [32] 32.   Signs/symptoms of respiratory failure include- tachypnea, increased work of breathing and wheezing. Contributing diagnoses includes - CHF and COPD Labs and images were reviewed. Patient Has not had a recent ABG. Will treat underlying causes and adjust management of respiratory failure as follows- steroids, duonebs, doxy, per COPD pathway    Will get BNP as well, if elevated consider lasix

## 2022-09-27 NOTE — H&P
[facility] - Emergency Dept  [facility] Medicine  History & Physical    Patient Name: Baltazar Mccray  MRN: [id number]  Patient Class: OP- Observation  Admission Date: 9/26/2022  Attending Physician: Bear Gomes MD   Primary Care Provider: [facility]         Patient information was obtained from patient, past medical records and ER records.     Subjective:     Principal Problem:Acute on chronic respiratory failure with hypoxia and hypercapnia    Chief Complaint:   Chief Complaint   Patient presents with    Shortness of Breath     Hx of COPD, home O2, nebs at home not working, cough    Cough        HPI: 61 yo male with COPD on 2L, HTN, tobacco/alcohol use presenting for shortness of breath and cough since yesterday. Symptoms started yesterday and have progressively gotten worse to where he cant breath very well. HE tried nebs at home but that did not work and prompted him to come in for evaluation. He had some chest tightness, congestion and abdominal pain that he associated with his shortness of breath. He has not been around any one that is sick, he states he quit smoking about one week ago. He had a similar presentation earlier this month, and some before that as well.    In Ed, started on duonebs, steroids and oxygen,flu negative, CXR without acute process, troponin elevated but stable and near baseline, medicine called for admission for COPD exacerbation.       Past Medical History:   Diagnosis Date    Asthma     Atrial fibrillation with rapid ventricular response     CHF (congestive heart failure)     COPD (chronic obstructive pulmonary disease)     home O2 at night only    Coronary artery disease     Hypertension     Lung nodule     Pneumonia     Seizures        Past Surgical History:   Procedure Laterality Date    ESOPHAGOGASTRODUODENOSCOPY N/A 2/11/2022    Procedure: EGD (ESOPHAGOGASTRODUODENOSCOPY);  Surgeon: Cain Ortega MD;  Location: [address]);  Service: Endoscopy;   Laterality: N/A;    ESOPHAGOGASTRODUODENOSCOPY N/A 9/15/2022    Procedure: EGD (ESOPHAGOGASTRODUODENOSCOPY);  Surgeon: Leonid Chavez MD;  Location: [facility] ENDO (2ND FLR);  Service: Endoscopy;  Laterality: N/A;  PA-50 - 2nd floor  vacc-wears 2L o2-inst mail and verbal-clears 4 hrs prior-tb  8/30 pt rescheduled; updated instructions mailed-    LEFT HEART CATHETERIZATION  4/23/2020    Procedure: Left heart cath;  Surgeon: Nic Pollack MD;  Location: [facility] CATH LAB;  Service: Cardiology;;       Review of patient's allergies indicates:   Allergen Reactions    Benazepril Swelling       No current facility-administered medications on file prior to encounter.     Current Outpatient Medications on File Prior to Encounter   Medication Sig    albuterol (PROVENTIL/VENTOLIN HFA) 90 mcg/actuation inhaler Inhale 1-2 puffs into the lungs every 6 (six) hours as needed for Wheezing. Rescue    albuterol-ipratropium (DUO-NEB) 2.5 mg-0.5 mg/3 mL nebulizer solution Take 3 mLs by nebulization every 4 (four) hours as needed for Wheezing or Shortness of Breath. Rescue    budesonide-glycopyr-formoterol (BREZTRI AEROSPHERE) 160-9-4.8 mcg/actuation HFAA Inhale 2 puffs into the lungs 2 (two) times daily. Rinse mouth after use. CONTROL INHALER    ferrous sulfate 325 (65 FE) MG EC tablet Take 1 tablet (325 mg total) by mouth once daily.    folic acid (FOLVITE) 1 MG tablet Take 1 tablet (1 mg total) by mouth once daily.    furosemide (LASIX) 20 MG tablet Take 2 tablets (40 mg total) by mouth once daily.    ibuprofen (ADVIL,MOTRIN) 200 MG tablet Take 800 mg by mouth daily as needed for Pain.    multivitamin (THERAGRAN) tablet Take 1 tablet by mouth once daily.    nicotine (NICODERM CQ) 7 mg/24 hr Place 1 patch onto the skin once daily.    NIFEdipine (PROCARDIA-XL) 30 MG (OSM) 24 hr tablet Take 1 tablet (30 mg total) by mouth once daily.    roflumilast (DALIRESP) 500 mcg Tab Take 500 mcg by mouth once daily.    thiamine 100 MG  tablet Take 1 tablet (100 mg total) by mouth once daily.    [DISCONTINUED] tiotropium bromide (SPIRIVA RESPIMAT) 2.5 mcg/actuation inhaler Inhale 2 puffs into the lungs Daily. Controller     Family History       Problem Relation (Age of Onset)    Cancer Father    Hypertension Sister    Stroke Sister, Brother          Tobacco Use    Smoking status: Some Days     Packs/day: 0.25     Types: Cigarettes    Smokeless tobacco: Never    Tobacco comments:     1-2 per day   Substance and Sexual Activity    Alcohol use: Yes     Alcohol/week: 2.0 standard drinks     Types: 2 Cans of beer per week     Comment: every night    Drug use: No    Sexual activity: Not Currently     Review of Systems   Constitutional:  Negative for activity change, appetite change, chills and fever.   HENT:  Positive for congestion. Negative for hearing loss and rhinorrhea.    Eyes:  Negative for discharge, itching and visual disturbance.   Respiratory:  Positive for cough, chest tightness and shortness of breath. Negative for apnea.    Cardiovascular:  Negative for chest pain, palpitations and leg swelling.   Gastrointestinal:  Positive for abdominal pain. Negative for abdominal distention, constipation, diarrhea, nausea and vomiting.   Endocrine: Negative for cold intolerance and heat intolerance.   Genitourinary:  Negative for dysuria and hematuria.   Musculoskeletal:  Negative for back pain, neck pain and neck stiffness.   Skin:  Negative for rash and wound.   Neurological:  Negative for dizziness, seizures, light-headedness and headaches.   Psychiatric/Behavioral:  Negative for agitation, confusion and suicidal ideas.    Objective:     Vital Signs (Most Recent):  Temp: 99.7 °F (37.6 °C) (09/26/22 1833)  Pulse: 98 (09/27/22 0031)  Resp: (!) 22 (09/27/22 0031)  BP: 124/73 (09/27/22 0031)  SpO2: 100 % (09/27/22 0031)   Vital Signs (24h Range):  Temp:  [99.7 °F (37.6 °C)] 99.7 °F (37.6 °C)  Pulse:  [] 98  Resp:  [22-31] 22  SpO2:  [100 %]  100 %  BP: (124-177)/(66-88) 124/73     Weight: 63.5 kg (140 lb)  Body mass index is 20.67 kg/m².    Physical Exam  Vitals reviewed.   Constitutional:       General: He is not in acute distress.     Appearance: He is well-developed.   HENT:      Head: Normocephalic and atraumatic.      Nose: Nose normal. No rhinorrhea.      Mouth/Throat:      Mouth: Mucous membranes are moist.   Eyes:      General: No scleral icterus.        Right eye: No discharge.         Left eye: No discharge.      Pupils: Pupils are equal, round, and reactive to light.   Neck:      Vascular: No JVD.   Cardiovascular:      Rate and Rhythm: Normal rate and regular rhythm.      Heart sounds: Normal heart sounds. No murmur heard.    No friction rub.   Pulmonary:      Effort: Pulmonary effort is normal. No respiratory distress.      Breath sounds: Wheezing and rhonchi present.   Abdominal:      General: Bowel sounds are normal. There is no distension.      Palpations: Abdomen is soft.      Tenderness: There is no abdominal tenderness.   Musculoskeletal:         General: No deformity. Normal range of motion.      Cervical back: Normal range of motion and neck supple.   Skin:     General: Skin is warm and dry.   Neurological:      General: No focal deficit present.      Mental Status: He is alert and oriented to person, place, and time.   Psychiatric:         Mood and Affect: Mood normal.         Behavior: Behavior normal.         CRANIAL NERVES     CN III, IV, VI   Pupils are equal, round, and reactive to light.     Significant Labs: All pertinent labs within the past 24 hours have been reviewed.  CBC:   Recent Labs   Lab 09/26/22 1913   WBC 12.54   HGB 13.1*   HCT 42.6        CMP:   Recent Labs   Lab 09/26/22 1913   *   K 3.9   CL 90*   CO2 32*   GLU 67*   BUN 11   CREATININE 0.7   CALCIUM 8.8   PROT 6.9   ALBUMIN 3.1*   BILITOT 0.8   ALKPHOS 86   AST 15   ALT 8*   ANIONGAP 12     Troponin:   Recent Labs   Lab 09/26/22 1913  09/26/22  2349   TROPONINI 0.044* 0.046*       Significant Imaging: I have reviewed all pertinent imaging results/findings within the past 24 hours.    Assessment/Plan:     * Acute on chronic respiratory failure with hypoxia and hypercapnia  Patient with Hypoxic Respiratory failure which is Acute on chronic.  he is on home oxygen at 2 LPM. Supplemental oxygen was provided and noted-  .   Signs/symptoms of respiratory failure include- tachypnea, increased work of breathing and wheezing. Contributing diagnoses includes - CHF and COPD Labs and images were reviewed. Patient Has not had a recent ABG. Will treat underlying causes and adjust management of respiratory failure as follows- steroids, duonebs, doxy, per COPD pathway    Will get BNP as well, if elevated consider lasix    Alcohol use disorder, mild, abuse  Will monitor on CIWA  Can start PRNs if needed      Chronic obstructive pulmonary disease  COPD pathway initiated as above      Nonobstructive atherosclerosis of coronary artery  Chronic, controlled, troponin elevation now stable no complaints of chest pain  Chest tightness improved after breathing treatments      Chronic diastolic congestive heart failure  Chronic, diastolic dysfunction, will get BNP, consider lasix if elevated      Tobacco abuse  Patient states he quit about 1 week ago  Will have nicotine patches if needed      Essential hypertension  Chronic, controlled, continue home medications        VTE Risk Mitigation (From admission, onward)         Ordered     enoxaparin injection 40 mg  Daily         09/27/22 0016     IP VTE HIGH RISK PATIENT  Once         09/27/22 0016     Place sequential compression device  Until discontinued         09/27/22 0016                   Praneeth Pickett MD  Department of Hospital Medicine   [facility] - Emergency Dept

## 2022-09-28 VITALS
RESPIRATION RATE: 19 BRPM | OXYGEN SATURATION: 94 % | DIASTOLIC BLOOD PRESSURE: 77 MMHG | TEMPERATURE: 98 F | HEART RATE: 68 BPM | WEIGHT: 140 LBS | BODY MASS INDEX: 20.67 KG/M2 | SYSTOLIC BLOOD PRESSURE: 162 MMHG

## 2022-09-28 PROBLEM — J69.0 ASPIRATION PNEUMONIA: Status: RESOLVED | Noted: 2021-05-30 | Resolved: 2022-09-28

## 2022-09-28 PROBLEM — I48.91 ATRIAL FIBRILLATION WITH RAPID VENTRICULAR RESPONSE: Status: RESOLVED | Noted: 2022-01-28 | Resolved: 2022-09-28

## 2022-09-28 PROBLEM — D72.829 LEUCOCYTOSIS: Status: RESOLVED | Noted: 2022-09-12 | Resolved: 2022-09-28

## 2022-09-28 PROBLEM — J44.1 COPD EXACERBATION: Status: RESOLVED | Noted: 2017-05-14 | Resolved: 2022-09-28

## 2022-09-28 PROBLEM — E87.1 HYPONATREMIA: Status: RESOLVED | Noted: 2022-07-22 | Resolved: 2022-09-28

## 2022-09-28 PROBLEM — J18.9 PNA (PNEUMONIA): Status: RESOLVED | Noted: 2022-09-12 | Resolved: 2022-09-28

## 2022-09-28 LAB
ANION GAP SERPL CALC-SCNC: 11 MMOL/L (ref 8–16)
BASOPHILS # BLD AUTO: 0.01 K/UL (ref 0–0.2)
BASOPHILS NFR BLD: 0.2 % (ref 0–1.9)
BUN SERPL-MCNC: 16 MG/DL (ref 6–20)
CALCIUM SERPL-MCNC: 10 MG/DL (ref 8.7–10.5)
CHLORIDE SERPL-SCNC: 90 MMOL/L (ref 95–110)
CO2 SERPL-SCNC: 37 MMOL/L (ref 23–29)
CREAT SERPL-MCNC: 0.8 MG/DL (ref 0.5–1.4)
DIFFERENTIAL METHOD: ABNORMAL
EOSINOPHIL # BLD AUTO: 0 K/UL (ref 0–0.5)
EOSINOPHIL NFR BLD: 0 % (ref 0–8)
ERYTHROCYTE [DISTWIDTH] IN BLOOD BY AUTOMATED COUNT: 22.3 % (ref 11.5–14.5)
EST. GFR  (NO RACE VARIABLE): >60 ML/MIN/1.73 M^2
GLUCOSE SERPL-MCNC: 102 MG/DL (ref 70–110)
HCT VFR BLD AUTO: 38.5 % (ref 40–54)
HGB BLD-MCNC: 11.8 G/DL (ref 14–18)
IMM GRANULOCYTES # BLD AUTO: 0.04 K/UL (ref 0–0.04)
IMM GRANULOCYTES NFR BLD AUTO: 0.6 % (ref 0–0.5)
LYMPHOCYTES # BLD AUTO: 0.9 K/UL (ref 1–4.8)
LYMPHOCYTES NFR BLD: 13.2 % (ref 18–48)
MAGNESIUM SERPL-MCNC: 1.6 MG/DL (ref 1.6–2.6)
MCH RBC QN AUTO: 23.7 PG (ref 27–31)
MCHC RBC AUTO-ENTMCNC: 30.6 G/DL (ref 32–36)
MCV RBC AUTO: 77 FL (ref 82–98)
MONOCYTES # BLD AUTO: 0.8 K/UL (ref 0.3–1)
MONOCYTES NFR BLD: 12.2 % (ref 4–15)
NEUTROPHILS # BLD AUTO: 4.9 K/UL (ref 1.8–7.7)
NEUTROPHILS NFR BLD: 73.8 % (ref 38–73)
NRBC BLD-RTO: 0 /100 WBC
PHOSPHATE SERPL-MCNC: 1.8 MG/DL (ref 2.7–4.5)
PLATELET # BLD AUTO: 263 K/UL (ref 150–450)
PMV BLD AUTO: 9.8 FL (ref 9.2–12.9)
POTASSIUM SERPL-SCNC: 4.3 MMOL/L (ref 3.5–5.1)
RBC # BLD AUTO: 4.98 M/UL (ref 4.6–6.2)
SODIUM SERPL-SCNC: 138 MMOL/L (ref 136–145)
WBC # BLD AUTO: 6.66 K/UL (ref 3.9–12.7)

## 2022-09-28 PROCEDURE — 99900035 HC TECH TIME PER 15 MIN (STAT)

## 2022-09-28 PROCEDURE — 63600175 PHARM REV CODE 636 W HCPCS: Performed by: INTERNAL MEDICINE

## 2022-09-28 PROCEDURE — 80048 BASIC METABOLIC PNL TOTAL CA: CPT | Performed by: INTERNAL MEDICINE

## 2022-09-28 PROCEDURE — 84100 ASSAY OF PHOSPHORUS: CPT | Performed by: INTERNAL MEDICINE

## 2022-09-28 PROCEDURE — 93010 ELECTROCARDIOGRAM REPORT: CPT | Mod: ,,, | Performed by: STUDENT IN AN ORGANIZED HEALTH CARE EDUCATION/TRAINING PROGRAM

## 2022-09-28 PROCEDURE — 94660 CPAP INITIATION&MGMT: CPT

## 2022-09-28 PROCEDURE — 93005 ELECTROCARDIOGRAM TRACING: CPT

## 2022-09-28 PROCEDURE — S4991 NICOTINE PATCH NONLEGEND: HCPCS | Performed by: INTERNAL MEDICINE

## 2022-09-28 PROCEDURE — 83735 ASSAY OF MAGNESIUM: CPT | Performed by: INTERNAL MEDICINE

## 2022-09-28 PROCEDURE — 94761 N-INVAS EAR/PLS OXIMETRY MLT: CPT

## 2022-09-28 PROCEDURE — 25000003 PHARM REV CODE 250: Performed by: HOSPITALIST

## 2022-09-28 PROCEDURE — 93010 EKG 12-LEAD: ICD-10-PCS | Mod: ,,, | Performed by: STUDENT IN AN ORGANIZED HEALTH CARE EDUCATION/TRAINING PROGRAM

## 2022-09-28 PROCEDURE — 25000003 PHARM REV CODE 250: Performed by: INTERNAL MEDICINE

## 2022-09-28 PROCEDURE — G0378 HOSPITAL OBSERVATION PER HR: HCPCS

## 2022-09-28 PROCEDURE — 27000221 HC OXYGEN, UP TO 24 HOURS

## 2022-09-28 PROCEDURE — 99217 PR OBSERVATION CARE DISCHARGE: ICD-10-PCS | Mod: ,,, | Performed by: HOSPITALIST

## 2022-09-28 PROCEDURE — 25000242 PHARM REV CODE 250 ALT 637 W/ HCPCS: Performed by: INTERNAL MEDICINE

## 2022-09-28 PROCEDURE — 99217 PR OBSERVATION CARE DISCHARGE: CPT | Mod: ,,, | Performed by: HOSPITALIST

## 2022-09-28 PROCEDURE — 94640 AIRWAY INHALATION TREATMENT: CPT

## 2022-09-28 PROCEDURE — 85025 COMPLETE CBC W/AUTO DIFF WBC: CPT | Performed by: INTERNAL MEDICINE

## 2022-09-28 PROCEDURE — 36415 COLL VENOUS BLD VENIPUNCTURE: CPT | Performed by: INTERNAL MEDICINE

## 2022-09-28 RX ORDER — DOXYCYCLINE HYCLATE 100 MG
100 TABLET ORAL EVERY 12 HOURS
Qty: 8 TABLET | Refills: 0 | Status: SHIPPED | OUTPATIENT
Start: 2022-09-28 | End: 2022-10-02

## 2022-09-28 RX ORDER — SODIUM,POTASSIUM PHOSPHATES 280-250MG
1 POWDER IN PACKET (EA) ORAL
Status: DISCONTINUED | OUTPATIENT
Start: 2022-09-28 | End: 2022-09-28 | Stop reason: HOSPADM

## 2022-09-28 RX ORDER — ROFLUMILAST 500 UG/1
500 TABLET ORAL DAILY
Qty: 30 TABLET | Refills: 1 | Status: SHIPPED | OUTPATIENT
Start: 2022-09-28 | End: 2022-10-28

## 2022-09-28 RX ORDER — PREDNISONE 20 MG/1
40 TABLET ORAL DAILY
Qty: 10 TABLET | Refills: 0 | Status: SHIPPED | OUTPATIENT
Start: 2022-09-29 | End: 2022-10-04

## 2022-09-28 RX ORDER — GUAIFENESIN 600 MG/1
600 TABLET, EXTENDED RELEASE ORAL 2 TIMES DAILY
Status: DISCONTINUED | OUTPATIENT
Start: 2022-09-28 | End: 2022-09-28 | Stop reason: HOSPADM

## 2022-09-28 RX ADMIN — NIFEDIPINE 30 MG: 30 TABLET, FILM COATED, EXTENDED RELEASE ORAL at 09:09

## 2022-09-28 RX ADMIN — NICOTINE 1 PATCH: 7 PATCH, EXTENDED RELEASE TRANSDERMAL at 09:09

## 2022-09-28 RX ADMIN — ONDANSETRON 8 MG: 8 TABLET, ORALLY DISINTEGRATING ORAL at 01:09

## 2022-09-28 RX ADMIN — FERROUS SULFATE TAB 325 MG (65 MG ELEMENTAL FE) 1 EACH: 325 (65 FE) TAB at 09:09

## 2022-09-28 RX ADMIN — FUROSEMIDE 40 MG: 40 TABLET ORAL at 09:09

## 2022-09-28 RX ADMIN — GUAIFENESIN 600 MG: 600 TABLET, EXTENDED RELEASE ORAL at 02:09

## 2022-09-28 RX ADMIN — GUAIFENESIN 200 MG: 200 SOLUTION ORAL at 09:09

## 2022-09-28 RX ADMIN — POTASSIUM & SODIUM PHOSPHATES POWDER PACK 280-160-250 MG 1 PACKET: 280-160-250 PACK at 12:09

## 2022-09-28 RX ADMIN — IPRATROPIUM BROMIDE AND ALBUTEROL SULFATE 3 ML: 2.5; .5 SOLUTION RESPIRATORY (INHALATION) at 07:09

## 2022-09-28 RX ADMIN — THERA TABS 1 TABLET: TAB at 09:09

## 2022-09-28 RX ADMIN — FOLIC ACID 1 MG: 1 TABLET ORAL at 09:09

## 2022-09-28 RX ADMIN — PREDNISONE 40 MG: 20 TABLET ORAL at 09:09

## 2022-09-28 RX ADMIN — Medication 100 MG: at 09:09

## 2022-09-28 RX ADMIN — DOXYCYCLINE HYCLATE 100 MG: 100 TABLET, COATED ORAL at 09:09

## 2022-09-28 NOTE — DISCHARGE INSTRUCTIONS
Dear Ms. Baltazar Mccray   You were hospitalized for COPD exacerbation. You were treated with breathing treatments, antibiotics and steroids and your breathing improved back to your home level of oxygen. I have sent your medications down to the pharmacy. I have also placed referrals for primary care, lung doctors and other clinics to help with your COPD.    It is a pleasure to participate in your care.    Sincerely,      Sommer Cabrera MD

## 2022-09-28 NOTE — ASSESSMENT & PLAN NOTE
Improved  Patient with Hypoxic Respiratory failure which is Acute on chronic.  he is on home oxygen at 2 LPM. Supplemental oxygen was provided and noted-  .   Signs/symptoms of respiratory failure include- tachypnea, increased work of breathing and wheezing. Contributing diagnoses includes - CHF and COPD Labs and images were reviewed. Patient Has not had a recent ABG. Will treat underlying causes and adjust management of respiratory failure as follows- steroids, duonebs, doxy, per COPD pathway

## 2022-09-28 NOTE — ASSESSMENT & PLAN NOTE
Smoke on and off and last cigarette smoking prior getting sick  Counseling given for 3 min today on importance of quiting.

## 2022-09-28 NOTE — ASSESSMENT & PLAN NOTE
Acute COPD exacerbation. Started on steroids, nebs, doxycycline. Improved. Discharged with 4 more days of steroids and doxy.

## 2022-09-28 NOTE — DISCHARGE SUMMARY
[facility] - Intensive Care ([address] Medicine  Discharge Summary      Patient Name: Baltazar Mccray  MRN: [id number]  Patient Class: OP- Observation  Admission Date: 9/26/2022  Hospital Length of Stay: 0 days  Discharge Date and Time:  09/28/2022 4:45 PM  Attending Physician: No att. providers found   Discharging Provider: Sommer Cabrera MD  Primary Care Provider: [facility] Medicine Team: [facility] O Sommer Cabrera MD    HPI:   63 yo male with COPD on 2L, HTN, tobacco/alcohol use presenting for shortness of breath and cough since yesterday. Symptoms started yesterday and have progressively gotten worse to where he cant breath very well. HE tried nebs at home but that did not work and prompted him to come in for evaluation. He had some chest tightness, congestion and abdominal pain that he associated with his shortness of breath. He has not been around any one that is sick, he states he quit smoking about one week ago. He had a similar presentation earlier this month, and some before that as well.    In Ed, started on duonebs, steroids and oxygen,flu negative, CXR without acute process, troponin elevated but stable and near baseline, medicine called for admission for COPD exacerbation.       * No surgery found *      Hospital Course:   Presented with dyspnea. He was started on duonebs, steroids and oxygen. He was negative for flu and covid negative, CXR without acute process, troponin elevated but stable and near baseline. Admited for COPD exacerbation.  He improved with nebs, steroids and doxycycline. Initially required increased o2 up to 4 lpm from baseline 2 lpm but was able to wean back down to home o2.  He felt near baseline and was amenable to discharge. He was referred to pulmonology, COPD f/u clinic and PCP.        Goals of Care Treatment Preferences:  Code Status: Full Code      Consults:   Consults (From admission, onward)        Status Ordering Provider      COPD PharmD consult  Once        Provider:  (Not yet assigned)    Acknowledged EVARISTO BESS          * Acute on chronic respiratory failure with hypoxia and hypercapnia  Improved  Patient with Hypoxic Respiratory failure which is Acute on chronic.  he is on home oxygen at 2 LPM. Supplemental oxygen was provided and noted-  .   Signs/symptoms of respiratory failure include- tachypnea, increased work of breathing and wheezing. Contributing diagnoses includes - CHF and COPD Labs and images were reviewed. Patient Has not had a recent ABG. Will treat underlying causes and adjust management of respiratory failure as follows- steroids, duonebs, doxy, per COPD pathway        Atrial fibrillation  Patient with Long standing persistent (>12 months) atrial fibrillation which is controlled currently with consider B-blocker. Patient is currently in atrial fibrillation.[id number] Score: 1. HASBLED Score: Unable to calculate. Anticoagulation not indicated due to low chads score.        Alcohol use disorder, mild, abuse  Will monitor on CIWA  Can start PRNs if needed      Chronic obstructive pulmonary disease  Acute COPD exacerbation. Started on steroids, nebs, doxycycline. Improved. Discharged with 4 more days of steroids and doxy.         Nonobstructive atherosclerosis of coronary artery  Chronic, controlled, troponin elevation now stable no complaints of chest pain  Chest tightness improved after breathing treatments      Chronic diastolic congestive heart failure  Chronic, diastolic dysfunction    Tobacco abuse  Smoke on and off and last cigarette smoking prior getting sick  Counseling given for 3 min today on importance of quiting.     Essential hypertension  Chronic, controlled, continue home medications        Final Active Diagnoses:    Diagnosis Date Noted POA    PRINCIPAL PROBLEM:  Acute on chronic respiratory failure with hypoxia and hypercapnia [J96.21, J96.22] 01/29/2020 Yes    Acute bronchitis [J20.9] 07/22/2022 Yes     Atrial fibrillation [I48.91] 03/05/2022 Yes    Alcohol use disorder, mild, abuse [F10.10]  Yes     Chronic    Chronic obstructive pulmonary disease [J44.9] 04/27/2021 Yes     Chronic    Nonobstructive atherosclerosis of coronary artery [I25.10] 04/23/2020 Yes     Chronic    Chronic diastolic congestive heart failure [I50.32] 04/19/2020 Yes    Tobacco abuse [Z72.0] 08/12/2018 Yes     Chronic    Essential hypertension [I10] 05/19/2017 Yes     Chronic      Problems Resolved During this Admission:       Discharged Condition: fair    Disposition: Discharged to Nursing Fa*    Follow Up:    Patient Instructions:      Ambulatory referral/consult to Pulmonology   Standing Status: Future   Referral Priority: Routine Referral Type: Consultation   Referral Reason: Specialty Services Required   Requested Specialty: Pulmonary Disease   Number of Visits Requested: 1     Ambulatory referral/consult to Pulmonary Rehab   Standing Status: Future   Referral Priority: Routine Referral Type: Consultation   Referral Reason: Specialty Services Required   Requested Specialty: Pulmonary Disease   Number of Visits Requested: 1     Ambulatory referral/consult to COPD Discharge Clinic   Standing Status: Future   Referral Priority: Routine Referral Type: Consultation   Referral Reason: Specialty Services Required   Requested Specialty: Pulmonary Disease   Number of Visits Requested: 1     Ambulatory referral/consult to Smoking Cessation Program   Standing Status: Future   Referral Priority: Routine Referral Type: Consultation   Referral Reason: Specialty Services Required   Requested Specialty: CTTS   Number of Visits Requested: 1     Ambulatory referral/consult to Outpatient Case Management   Referral Priority: Routine Referral Type: Consultation   Referral Reason: Specialty Services Required   Number of Visits Requested: 1     Ambulatory referral/consult to [facility]   Standing Status: Future   Referral Priority:  Routine Referral Type: Consultation   Referral Reason: Specialty Services Required   Number of Visits Requested: 1     Diet Adult Regular     Activity as tolerated     Ambulatory Request for Ready Responder Services   Standing Status: Future Standing Exp. Date: 09/28/23     Order Specific Question Answer Comments   Program referred to: COVID-19 Vaccine        Significant Diagnostic Studies:      Pending Diagnostic Studies:     None         Medications:  Reconciled Home Medications:      Medication List      START taking these medications    doxycycline 100 MG tablet  Commonly known as: VIBRA-TABS  Take 1 tablet (100 mg total) by mouth every 12 (twelve) hours. for 4 days     predniSONE 20 MG tablet  Commonly known as: DELTASONE  Take 2 tablets (40 mg total) by mouth once daily. for 5 days  Start taking on: September 29, 2022        CONTINUE taking these medications    albuterol 90 mcg/actuation inhaler  Commonly known as: PROVENTIL/VENTOLIN HFA  Inhale 1-2 puffs into the lungs every 6 (six) hours as needed for Wheezing. Rescue     albuterol-ipratropium 2.5 mg-0.5 mg/3 mL nebulizer solution  Commonly known as: DUO-NEB  Take 3 mLs by nebulization every 4 (four) hours as needed for Wheezing or Shortness of Breath. Rescue     BREZTRI AEROSPHERE 160-9-4.8 mcg/actuation Hfaa  Generic drug: budesonide-glycopyr-formoterol  Inhale 2 puffs into the lungs 2 (two) times daily. Rinse mouth after use. CONTROL INHALER     DALIRESP 500 mcg Tab  Generic drug: roflumilast  Take 1 tablet (500 mcg total) by mouth once daily.     ferrous sulfate 325 (65 FE) MG EC tablet  Take 1 tablet (325 mg total) by mouth once daily.     folic acid 1 MG tablet  Commonly known as: FOLVITE  Take 1 tablet (1 mg total) by mouth once daily.     furosemide 20 MG tablet  Commonly known as: LASIX  Take 2 tablets (40 mg total) by mouth once daily.     ibuprofen 200 MG tablet  Commonly known as: ADVIL,MOTRIN  Take 800 mg by mouth daily as needed for Pain.      nicotine 7 mg/24 hr  Commonly known as: NICODERM CQ  Place 1 patch onto the skin once daily.     NIFEdipine 30 MG (OSM) 24 hr tablet  Commonly known as: PROCARDIA-XL  Take 1 tablet (30 mg total) by mouth once daily.     ONCOVITE tablet  Generic drug: multivitamin  Take 1 tablet by mouth once daily.     thiamine 100 MG tablet  Take 1 tablet (100 mg total) by mouth once daily.            Indwelling Lines/Drains at time of discharge:   Lines/Drains/Airways     None                 Time spent on the discharge of patient: 35 minutes         Sommer Cabrera MD  Department of Hospital Medicine  [facility] - Intensive Care ([place]-16)

## 2022-09-28 NOTE — ASSESSMENT & PLAN NOTE
Patient with Long standing persistent (>12 months) atrial fibrillation which is controlled currently with consider B-blocker. Patient is currently in atrial fibrillation.[id number] Score: 1. HASBLED Score: Unable to calculate. Anticoagulation not indicated due to low chads score.

## 2022-10-03 NOTE — PLAN OF CARE
[facility] - Intensive Care ([facility])  Discharge Final Note    Primary Care Provider: [facility]    Expected Discharge Date: 9/28/2022    Final Discharge Note (most recent)       Final Note - 10/03/22 1304          Final Note    Assessment Type Final Discharge Note     Anticipated Discharge Disposition Home or Self Care (P)         Post-Acute Status    Discharge Delays None known at this time (P)                      Important Message from Medicare

## 2022-10-07 ENCOUNTER — PATIENT OUTREACH (OUTPATIENT)
Dept: ADMINISTRATIVE | Facility: OTHER | Age: 62
End: 2022-10-07
Payer: MEDICARE

## 2022-10-07 NOTE — PROGRESS NOTES
CHW - Follow Up    Janet Carmichael, Community Health Worker completed a follow up visit with patient via telephone today.  Pt/Caregiver reported: Mr. Mccray reported he recently was discharged from the hospital.   Community Health Worker provided: CHW provided Pt with contact information for Ms. Jazmín Kingan, Discharge Nurse.  Mr. Mccray stated that he receives SNAP benefits and has not been late with his rent in the last 8 months. CHW started to update the Social Determinants of Health with Pt, however the call for disconnected.        CHW - Case Closure    CHW provided patient with her contact information and encouraged him to contact her if additional needs arise.     No future outreach task assigned

## 2022-10-12 ENCOUNTER — CARE AT HOME (OUTPATIENT)
Dept: HOME HEALTH SERVICES | Facility: CLINIC | Age: 62
End: 2022-10-12
Payer: MEDICARE

## 2022-10-12 DIAGNOSIS — Z72.0 TOBACCO ABUSE: Chronic | ICD-10-CM

## 2022-10-12 DIAGNOSIS — J42 CHRONIC BRONCHITIS, UNSPECIFIED CHRONIC BRONCHITIS TYPE: Chronic | ICD-10-CM

## 2022-10-12 PROCEDURE — 99349 PR HOME VISIT,ESTAB PATIENT,LEVEL III: ICD-10-PCS | Mod: S$GLB,,, | Performed by: NURSE PRACTITIONER

## 2022-10-12 PROCEDURE — 99349 HOME/RES VST EST MOD MDM 40: CPT | Mod: S$GLB,,, | Performed by: NURSE PRACTITIONER

## 2022-10-12 PROCEDURE — 1111F PR DISCHARGE MEDS RECONCILED W/ CURRENT OUTPATIENT MED LIST: ICD-10-PCS | Mod: CPTII,S$GLB,, | Performed by: NURSE PRACTITIONER

## 2022-10-12 PROCEDURE — 1111F DSCHRG MED/CURRENT MED MERGE: CPT | Mod: CPTII,S$GLB,, | Performed by: NURSE PRACTITIONER

## 2022-10-13 VITALS
OXYGEN SATURATION: 91 % | DIASTOLIC BLOOD PRESSURE: 82 MMHG | HEIGHT: 69 IN | WEIGHT: 140 LBS | HEART RATE: 110 BPM | SYSTOLIC BLOOD PRESSURE: 100 MMHG | RESPIRATION RATE: 18 BRPM | BODY MASS INDEX: 20.73 KG/M2

## 2022-10-13 NOTE — ASSESSMENT & PLAN NOTE
Pt taking prescribed antibiotics, nebulizers, inhalers and using home 02  Pt still smoking 1-2 cigarettes per day.   Counseling given for smoking cessation.

## 2022-10-13 NOTE — PATIENT INSTRUCTIONS
Instructions:  - [facility] Nurse Practitioner to schedule home follow-up visit with patient 4 weeks or as needed.  - Continue all medications, treatments and therapies as ordered.   - Follow all instructions, recommendations as discussed.  - Maintain Safety Precautions at all times.  - Attend all medical appointments as scheduled.  - For worsening symptoms: call Primary Care Physician or Nurse Practitioner.  - For emergencies, call 911 or immediately report to the nearest emergency room.  - Limit Risks of environmental exposure to coronavirus/COVID-19 as discussed including: social distancing, hand hygiene, and limiting departures from the home for necessities only.

## 2022-10-13 NOTE — ASSESSMENT & PLAN NOTE
Pt still smoking and using home 02.   Counseling given for smoking cessation and no smoking around oxygen  Pt using Nicotine patch daily

## 2022-10-13 NOTE — PROGRESS NOTES
[facility]  Transition of Care Home Visit    Visit Date: 10/12/22  Encounter Provider: Lauri Benitez   PCP:  [facility]    PRESENTING HISTORY      Patient ID: Baltazar Mccray is a 62 y.o. male.    Consult Requested By:  No ref. provider found  Reason for Consult:  Hospital Follow Up.    Baltazar is being seen at home due to being seen at home due to physical debility that presents a taxing effort to leave the home, to mitigate high risk of hospital readmission and/or due to the limited availability of reliable or safe options for transportation to the point of access to the provider. Prior to treatment on this visit the chart was reviewed and patient verbal consent was obtained.      Chief Complaint: Follow-up        History of Present Illness: Mr. Baltazar Mccray is a 62 y.o. male who was recently admitted to the hospital for COPD excerebration.     Admission Date: 8/10/2022  Hospital Length of Stay: 0 days  Discharge Date and Time: 8/12/2022      HPI:     Hospital Course:   Mr. Mccray was admitted to hospital medicine for acute on chronic respiratory failure in setting of COPD exacerbation. Initial pH 7.299 w/ pCO2 76.8 consistent with hypercapnic respiratory failure, placed on BIPAP and repeat VBG improved, weaned to home oxygen. Started on scheduled breathing treatments and prednisone. Pulmonology consulted for recurrent COPD exacerbations, patient is using L[facility] more than recommended dosage [facility] be leading to [facility]ospasm. Patient educated on proper use. Recommend azithromycin 500 mg MWF and follow up in clinic. Patient with elevated BNP of 926 (prior normal) and repeat echo with EF 55%, indeterminate diastolic function, abnormal septal wall motion, intermediate CVP (8 mmHg) and known interatrial septal aneurysm. Home lasix increased to 40 mg and patient given one time dose of IV lasix with significant UOP and improvement in SOB. Patient discharged on prednisone to complete 5 day  course, azithromycin MWF, and lasix 40 mg daily. Educated on smoking cessation, nicotine patches ordered and and referral placed. BMP in 1 week. Referral to COPD clinic placed. PCP follow up in 1 week.   _________________________________________________________  Today:Mr. Mccray is being evaluated at home for a transition of care visit s/p hospitalization, see above.     With this visit today patient is found sitting up on sofa with his continuous oxygen in progress. Patient is AAOx3, able to verify his name and . He sees [facility] as his PCP. I will continue seeing the patient in the home as it is difficult for him to physically make visits. He endorses eating x 3 small meals per day with Premiere protein as supplemental drinks for meal replacements. Pt's sister helps him with household chores and his medications. She is not present today during visit.  Pt taking all recent hospital d/c medications. Still taking antibiotic. Pt was taking still taking his Coreg even though was d/c on latest hospital visit, advised pt to stop taking.   Oxygen precautions reinforced. Education completed ~ 15 minutes. Plan to follow up.     VSS. Denies fever, chest pain,  nausea, vomiting, diarrhea. Risks of environmental exposure to coronavirus discussed including: social distancing, hand hygiene, and limiting departures from the home for necessities only.  Reports understanding and willingness to comply.  All hospital discharge orders reviewed and being followed, all medications reconciled and reviewed, patient and family verbalized understanding. No other needs identified at this time.     Attestation: Screening criteria to assess the level of the patient's risk for infection with COVID-19 as recommended by the CDC at the time of the above documented home visit concluded appropriateness to proceed. Universal precautions were maintained at all times, including provider use of 60% alcohol gel hand  immediately  prior to entry and upon departing the patient's home.      Review of Systems   Constitutional:  Negative for fatigue and unexpected weight change.   HENT:  Negative for congestion.    Eyes:  Negative for redness and visual disturbance.   Respiratory:  Positive for shortness of breath. Negative for cough.    Cardiovascular:  Negative for chest pain and palpitations.   Gastrointestinal:  Negative for abdominal distention, nausea and vomiting.   Endocrine: Negative for polydipsia and polyuria.   Genitourinary:  Negative for difficulty urinating.   Musculoskeletal:  Negative for arthralgias and gait problem.   Skin:  Negative for color change.   Allergic/Immunologic: Negative for food allergies.   Neurological:  Negative for dizziness and weakness.   Psychiatric/Behavioral:  Negative for agitation.      Assessments:  Environmental: Lives in apartments, 1st floor  Functional Status: Independent  Safety: Fall precautions  Nutritional: n/a  Home Health/DME/Supplies: n/a    PAST HISTORY:     Past Medical History:   Diagnosis Date    Aspiration pneumonia 5/30/2021    Asthma     Atrial fibrillation with rapid ventricular response     CHF (congestive heart failure)     COPD (chronic obstructive pulmonary disease)     home O2 at night only    Coronary artery disease     Hypertension     Lung nodule     Pneumonia     Seizures        Past Surgical History:   Procedure Laterality Date    ESOPHAGOGASTRODUODENOSCOPY N/A 2/11/2022    Procedure: EGD (ESOPHAGOGASTRODUODENOSCOPY);  Surgeon: Cain Ortega MD;  Location: [facility] ([address] [place] [address]);  Service: Endoscopy;  Laterality: N/A;    ESOPHAGOGASTRODUODENOSCOPY N/A 9/15/2022    Procedure: EGD (ESOPHAGOGASTRODUODENOSCOPY);  Surgeon: Leonid Chavez MD;  Location: [address]);  Service: Endoscopy;  Laterality: N/A;  PA-50 - 2nd floor  vacc-wears 2L o2-inst mail and verbal-clears 4 hrs prior-tb  8/30 pt rescheduled; updated instructions mailed-st    LEFT HEART CATHETERIZATION   4/23/2020    Procedure: Left heart cath;  Surgeon: Nic Pollack MD;  Location: [facility] CATH LAB;  Service: Cardiology;;       Family History   Problem Relation Age of Onset    Cancer Father     Hypertension Sister     Stroke Sister     Stroke Brother     Diabetes Neg Hx     Heart disease Neg Hx        Social History     Socioeconomic History    Marital status: Single   Tobacco Use    Smoking status: Some Days     Packs/day: 0.25     Types: Cigarettes    Smokeless tobacco: Never    Tobacco comments:     1-2 cigarettes per day   Substance and Sexual Activity    Alcohol use: Yes     Alcohol/week: 2.0 standard drinks     Types: 2 Cans of beer per week     Comment: every night    Drug use: No    Sexual activity: Not Currently   Social History Narrative    Patient' nephew is currently living with him in his apartment. Patient's sister Viry ([phone]) helps manage patient's care.            6/3/21    Patient is no longer staying with family. Patient is currently staying at the [facility] and working on getting into their program for more supportive housing.      Social Determinants of Health     Financial Resource Strain: Low Risk     Difficulty of Paying Living Expenses: Not very hard   Food Insecurity: No Food Insecurity    Worried About Running Out of Food in the Last Year: Never true    Ran Out of Food in the Last Year: Never true   Transportation Needs: No Transportation Needs    Lack of Transportation (Medical): No    Lack of Transportation (Non-Medical): No   Physical Activity: Inactive    Days of Exercise per Week: 0 days    Minutes of Exercise per Session: 0 min   Stress: Unknown    Feeling of Stress : Patient refused   Social Connections: Socially Isolated    Frequency of Communication with Friends and Family: More than three times a week    Frequency of Social Gatherings with Friends and Family: More than three times a week    Attends Orthodox Services: Never    Active Member of Clubs or  Organizations: No    Attends Club or Organization Meetings: Never    Marital Status: Never    Housing Stability: Low Risk     Unable to Pay for Housing in the Last Year: No    Number of Places Lived in the Last Year: 2    Unstable Housing in the Last Year: No       MEDICATIONS & ALLERGIES:     Current Outpatient Medications on File Prior to Visit   Medication Sig Dispense Refill    albuterol (PROVENTIL/VENTOLIN HFA) 90 mcg/actuation inhaler Inhale 1-2 puffs into the lungs every 6 (six) hours as needed for Wheezing. Rescue 8.5 g 11    albuterol-ipratropium (DUO-NEB) 2.5 mg-0.5 mg/3 mL nebulizer solution Take 3 mLs by nebulization every 4 (four) hours as needed for Wheezing or Shortness of Breath. Rescue 180 mL 11    budesonide-glycopyr-formoterol (BREZTRI AEROSPHERE) 160-9-4.8 mcg/actuation HFAA Inhale 2 puffs into the lungs 2 (two) times daily. Rinse mouth after use. CONTROL INHALER 10.7 g 11    ferrous sulfate 325 (65 FE) MG EC tablet Take 1 tablet (325 mg total) by mouth once daily. 30 tablet 1    folic acid (FOLVITE) 1 MG tablet Take 1 tablet (1 mg total) by mouth once daily. 30 tablet 2    furosemide (LASIX) 20 MG tablet Take 2 tablets (40 mg total) by mouth once daily. 60 tablet 11    ibuprofen (ADVIL,MOTRIN) 200 MG tablet Take 800 mg by mouth daily as needed for Pain.      multivitamin (THERAGRAN) tablet Take 1 tablet by mouth once daily. 30 tablet 2    nicotine (NICODERM CQ) 7 mg/24 hr Place 1 patch onto the skin once daily. 30 patch 2    NIFEdipine (PROCARDIA-XL) 30 MG (OSM) 24 hr tablet Take 1 tablet (30 mg total) by mouth once daily. 30 tablet 11    roflumilast (DALIRESP) 500 mcg Tab Take 1 tablet (500 mcg total) by mouth once daily. 30 tablet 1    thiamine 100 MG tablet Take 1 tablet (100 mg total) by mouth once daily. 30 tablet 2    [DISCONTINUED] tiotropium bromide (SPIRIVA RESPIMAT) 2.5 mcg/actuation inhaler Inhale 2 puffs into the lungs Daily. Controller 4 g 5     No current  facility-administered medications on file prior to visit.        Review of patient's allergies indicates:   Allergen Reactions    Benazepril Swelling       OBJECTIVE:     Vital Signs:  Vitals:    10/12/22 0950   BP: 100/82   Pulse: 110   Resp: 18     Body mass index is 20.67 kg/m².     Physical Exam:  Physical Exam  HENT:      Head: Atraumatic.      Nose: Nose normal.   Cardiovascular:      Rate and Rhythm: Normal rate.   Pulmonary:      Effort: Pulmonary effort is normal.      Comments: Continuous supplemental oxygen in use 2L/NC  Abdominal:      General: Abdomen is flat.   Musculoskeletal:         General: Normal range of motion.   Skin:     General: Skin is warm and dry.      Capillary Refill: Capillary refill takes less than 2 seconds.   Neurological:      Mental Status: He is alert and oriented to person, place, and time.   Psychiatric:         Mood and Affect: Mood normal.       Laboratory  Lab Results   Component Value Date    WBC 6.66 09/28/2022    HGB 11.8 (L) 09/28/2022    HCT 38.5 (L) 09/28/2022    MCV 77 (L) 09/28/2022     09/28/2022     Lab Results   Component Value Date    INR 1.0 03/14/2022    INR 1.0 04/19/2020    INR 1.0 01/03/2020     Lab Results   Component Value Date    HGBA1C 5.7 (H) 02/10/2022     No results for input(s): POCTGLUCOSE in the last 72 hours.    Diagnostic Results:  N/A      Medications Reconciliation:   I have reconciled the patient's home medications and discharge medications with the patient/family. I have updated all changes.  Refer to After-Visit Medication List.    ASSESSMENT & PLAN:     Tobacco Abuse  Pt still smoking and using home 02.   Counseling given for smoking cessation and no smoking around oxygen  Pt using Nicotine patch daily    COPD  Pt taking prescribed antibiotics, nebulizers, inhalers and using home 02  Pt still smoking 1-2 cigarettes per day.   Counseling given for smoking cessation.       Were controlled substances prescribed?  No      Patient  Instructions Given:  - Continue all medications, treatments and therapies as ordered.   - Follow all instructions, recommendations as discussed.  - Maintain Safety Precautions at all times.  - Attend all medical appointments as scheduled.  - For worsening symptoms: call Primary Care Physician or Nurse Practitioner.  - For emergencies, call 911 or immediately report to the nearest emergency room.    After Visit Medication List :     Medication List            Accurate as of October 12, 2022 11:59 PM. If you have any questions, ask your nurse or doctor.                CONTINUE taking these medications      albuterol 90 mcg/actuation inhaler  Commonly known as: PROVENTIL/VENTOLIN HFA  Inhale 1-2 puffs into the lungs every 6 (six) hours as needed for Wheezing. Rescue     albuterol-ipratropium 2.5 mg-0.5 mg/3 mL nebulizer solution  Commonly known as: DUO-NEB  Take 3 mLs by nebulization every 4 (four) hours as needed for Wheezing or Shortness of Breath. Rescue     BREZTRI AEROSPHERE 160-9-4.8 mcg/actuation Hfaa  Generic drug: budesonide-glycopyr-formoterol  Inhale 2 puffs into the lungs 2 (two) times daily. Rinse mouth after use. CONTROL INHALER     DALIRESP 500 mcg Tab  Generic drug: roflumilast  Take 1 tablet (500 mcg total) by mouth once daily.     ferrous sulfate 325 (65 FE) MG EC tablet  Take 1 tablet (325 mg total) by mouth once daily.     folic acid 1 MG tablet  Commonly known as: FOLVITE  Take 1 tablet (1 mg total) by mouth once daily.     furosemide 20 MG tablet  Commonly known as: LASIX  Take 2 tablets (40 mg total) by mouth once daily.     ibuprofen 200 MG tablet  Commonly known as: ADVIL,MOTRIN     nicotine 7 mg/24 hr  Commonly known as: NICODERM CQ  Place 1 patch onto the skin once daily.     NIFEdipine 30 MG (OSM) 24 hr tablet  Commonly known as: PROCARDIA-XL  Take 1 tablet (30 mg total) by mouth once daily.     ONCOVITE tablet  Generic drug: multivitamin  Take 1 tablet by mouth once daily.     thiamine 100  MG tablet  Take 1 tablet (100 mg total) by mouth once daily.            Future Appointments   Date Time Provider Department Center   12/2/2022 11:00 AM Carlo Pascal MD [facility]     Risks of environmental exposure to coronavirus discussed including: social distancing, hand hygiene, and limiting departures from the home for necessities only. Reports understanding and willingness to comply.     Signature:    Lauri Benitez  MSN, FNP-C  [facility]

## 2022-10-19 ENCOUNTER — HOSPITAL ENCOUNTER (OUTPATIENT)
Facility: HOSPITAL | Age: 62
Discharge: HOME | End: 2022-10-20
Attending: EMERGENCY MEDICINE | Admitting: STUDENT IN AN ORGANIZED HEALTH CARE EDUCATION/TRAINING PROGRAM
Payer: MEDICARE

## 2022-10-19 DIAGNOSIS — J44.9 CHRONIC OBSTRUCTIVE PULMONARY DISEASE, UNSPECIFIED COPD TYPE: ICD-10-CM

## 2022-10-19 DIAGNOSIS — R06.02 SHORTNESS OF BREATH: ICD-10-CM

## 2022-10-19 DIAGNOSIS — R09.02 HYPOXIA: ICD-10-CM

## 2022-10-19 DIAGNOSIS — J96.21 ACUTE ON CHRONIC RESPIRATORY FAILURE WITH HYPOXIA AND HYPERCAPNIA: ICD-10-CM

## 2022-10-19 DIAGNOSIS — J44.1 COPD EXACERBATION: Primary | ICD-10-CM

## 2022-10-19 DIAGNOSIS — J96.22 ACUTE ON CHRONIC RESPIRATORY FAILURE WITH HYPOXIA AND HYPERCAPNIA: ICD-10-CM

## 2022-10-19 DIAGNOSIS — R00.0 TACHYCARDIA: ICD-10-CM

## 2022-10-19 PROBLEM — E87.6 HYPOKALEMIA: Status: ACTIVE | Noted: 2022-10-19

## 2022-10-19 PROBLEM — F17.210 DEPENDENCE ON NICOTINE FROM CIGARETTES: Status: ACTIVE | Noted: 2018-08-12

## 2022-10-19 LAB
ALBUMIN SERPL BCP-MCNC: 3.4 G/DL (ref 3.5–5.2)
ALLENS TEST: ABNORMAL
ALP SERPL-CCNC: 94 U/L (ref 55–135)
ALT SERPL W/O P-5'-P-CCNC: 10 U/L (ref 10–44)
ANION GAP SERPL CALC-SCNC: 12 MMOL/L (ref 8–16)
ANION GAP SERPL CALC-SCNC: 15 MMOL/L (ref 8–16)
AST SERPL-CCNC: 15 U/L (ref 10–40)
BASOPHILS # BLD AUTO: 0.03 K/UL (ref 0–0.2)
BASOPHILS # BLD AUTO: 0.07 K/UL (ref 0–0.2)
BASOPHILS NFR BLD: 0.3 % (ref 0–1.9)
BASOPHILS NFR BLD: 0.8 % (ref 0–1.9)
BILIRUB SERPL-MCNC: 0.7 MG/DL (ref 0.1–1)
BNP SERPL-MCNC: 10 PG/ML (ref 0–99)
BUN SERPL-MCNC: 4 MG/DL (ref 6–20)
BUN SERPL-MCNC: 5 MG/DL (ref 6–20)
CALCIUM SERPL-MCNC: 8.8 MG/DL (ref 8.7–10.5)
CALCIUM SERPL-MCNC: 9.4 MG/DL (ref 8.7–10.5)
CHLORIDE SERPL-SCNC: 88 MMOL/L (ref 95–110)
CHLORIDE SERPL-SCNC: 90 MMOL/L (ref 95–110)
CO2 SERPL-SCNC: 29 MMOL/L (ref 23–29)
CO2 SERPL-SCNC: 34 MMOL/L (ref 23–29)
CREAT SERPL-MCNC: 0.8 MG/DL (ref 0.5–1.4)
CREAT SERPL-MCNC: 0.9 MG/DL (ref 0.5–1.4)
DELSYS: ABNORMAL
DIFFERENTIAL METHOD: ABNORMAL
DIFFERENTIAL METHOD: ABNORMAL
EOSINOPHIL # BLD AUTO: 0 K/UL (ref 0–0.5)
EOSINOPHIL # BLD AUTO: 0.5 K/UL (ref 0–0.5)
EOSINOPHIL NFR BLD: 0.3 % (ref 0–8)
EOSINOPHIL NFR BLD: 5.3 % (ref 0–8)
ERYTHROCYTE [DISTWIDTH] IN BLOOD BY AUTOMATED COUNT: 20.4 % (ref 11.5–14.5)
ERYTHROCYTE [DISTWIDTH] IN BLOOD BY AUTOMATED COUNT: 20.4 % (ref 11.5–14.5)
EST. GFR  (NO RACE VARIABLE): >60 ML/MIN/1.73 M^2
EST. GFR  (NO RACE VARIABLE): >60 ML/MIN/1.73 M^2
FLOW: 2
GLUCOSE SERPL-MCNC: 124 MG/DL (ref 70–110)
GLUCOSE SERPL-MCNC: 81 MG/DL (ref 70–110)
HCO3 UR-SCNC: 44.1 MMOL/L (ref 24–28)
HCT VFR BLD AUTO: 41.3 % (ref 40–54)
HCT VFR BLD AUTO: 43.4 % (ref 40–54)
HGB BLD-MCNC: 12.7 G/DL (ref 14–18)
HGB BLD-MCNC: 13.4 G/DL (ref 14–18)
IMM GRANULOCYTES # BLD AUTO: 0.02 K/UL (ref 0–0.04)
IMM GRANULOCYTES # BLD AUTO: 0.04 K/UL (ref 0–0.04)
IMM GRANULOCYTES NFR BLD AUTO: 0.2 % (ref 0–0.5)
IMM GRANULOCYTES NFR BLD AUTO: 0.4 % (ref 0–0.5)
INFLUENZA A, MOLECULAR: NEGATIVE
INFLUENZA B, MOLECULAR: NEGATIVE
LYMPHOCYTES # BLD AUTO: 0.5 K/UL (ref 1–4.8)
LYMPHOCYTES # BLD AUTO: 2.7 K/UL (ref 1–4.8)
LYMPHOCYTES NFR BLD: 30.4 % (ref 18–48)
LYMPHOCYTES NFR BLD: 4.9 % (ref 18–48)
MAGNESIUM SERPL-MCNC: 1.1 MG/DL (ref 1.6–2.6)
MCH RBC QN AUTO: 24.7 PG (ref 27–31)
MCH RBC QN AUTO: 24.8 PG (ref 27–31)
MCHC RBC AUTO-ENTMCNC: 30.8 G/DL (ref 32–36)
MCHC RBC AUTO-ENTMCNC: 30.9 G/DL (ref 32–36)
MCV RBC AUTO: 80 FL (ref 82–98)
MCV RBC AUTO: 80 FL (ref 82–98)
MODE: ABNORMAL
MONOCYTES # BLD AUTO: 0.1 K/UL (ref 0.3–1)
MONOCYTES # BLD AUTO: 0.7 K/UL (ref 0.3–1)
MONOCYTES NFR BLD: 1 % (ref 4–15)
MONOCYTES NFR BLD: 7.7 % (ref 4–15)
NEUTROPHILS # BLD AUTO: 4.9 K/UL (ref 1.8–7.7)
NEUTROPHILS # BLD AUTO: 9.8 K/UL (ref 1.8–7.7)
NEUTROPHILS NFR BLD: 55.6 % (ref 38–73)
NEUTROPHILS NFR BLD: 93.1 % (ref 38–73)
NRBC BLD-RTO: 0 /100 WBC
NRBC BLD-RTO: 0 /100 WBC
PCO2 BLDA: 68.2 MMHG (ref 35–45)
PH SMN: 7.42 [PH] (ref 7.35–7.45)
PHOSPHATE SERPL-MCNC: 2.2 MG/DL (ref 2.7–4.5)
PLATELET # BLD AUTO: 289 K/UL (ref 150–450)
PLATELET # BLD AUTO: 330 K/UL (ref 150–450)
PMV BLD AUTO: 9.5 FL (ref 9.2–12.9)
PMV BLD AUTO: 9.7 FL (ref 9.2–12.9)
PO2 BLDA: 26 MMHG (ref 40–60)
POC BE: 20 MMOL/L
POC SATURATED O2: 45 % (ref 95–100)
POC TCO2: 46 MMOL/L (ref 24–29)
POTASSIUM SERPL-SCNC: 3 MMOL/L (ref 3.5–5.1)
POTASSIUM SERPL-SCNC: 3.1 MMOL/L (ref 3.5–5.1)
PROCALCITONIN SERPL IA-MCNC: 0.03 NG/ML
PROT SERPL-MCNC: 7.4 G/DL (ref 6–8.4)
RBC # BLD AUTO: 5.14 M/UL (ref 4.6–6.2)
RBC # BLD AUTO: 5.4 M/UL (ref 4.6–6.2)
SAMPLE: ABNORMAL
SARS-COV-2 RDRP RESP QL NAA+PROBE: NEGATIVE
SITE: ABNORMAL
SODIUM SERPL-SCNC: 132 MMOL/L (ref 136–145)
SODIUM SERPL-SCNC: 136 MMOL/L (ref 136–145)
SPECIMEN SOURCE: NORMAL
TROPONIN I SERPL DL<=0.01 NG/ML-MCNC: 0.02 NG/ML (ref 0–0.03)
TROPONIN I SERPL DL<=0.01 NG/ML-MCNC: 0.03 NG/ML (ref 0–0.03)
WBC # BLD AUTO: 10.51 K/UL (ref 3.9–12.7)
WBC # BLD AUTO: 8.83 K/UL (ref 3.9–12.7)

## 2022-10-19 PROCEDURE — 94660 CPAP INITIATION&MGMT: CPT

## 2022-10-19 PROCEDURE — 94640 AIRWAY INHALATION TREATMENT: CPT | Mod: XB

## 2022-10-19 PROCEDURE — 84484 ASSAY OF TROPONIN QUANT: CPT | Mod: 91 | Performed by: STUDENT IN AN ORGANIZED HEALTH CARE EDUCATION/TRAINING PROGRAM

## 2022-10-19 PROCEDURE — 80053 COMPREHEN METABOLIC PANEL: CPT | Performed by: STUDENT IN AN ORGANIZED HEALTH CARE EDUCATION/TRAINING PROGRAM

## 2022-10-19 PROCEDURE — 84484 ASSAY OF TROPONIN QUANT: CPT

## 2022-10-19 PROCEDURE — G0378 HOSPITAL OBSERVATION PER HR: HCPCS

## 2022-10-19 PROCEDURE — 27000190 HC CPAP FULL FACE MASK W/VALVE

## 2022-10-19 PROCEDURE — 99285 PR EMERGENCY DEPT VISIT,LEVEL V: ICD-10-PCS | Mod: CS,,, | Performed by: EMERGENCY MEDICINE

## 2022-10-19 PROCEDURE — 99285 EMERGENCY DEPT VISIT HI MDM: CPT | Mod: CS,,, | Performed by: EMERGENCY MEDICINE

## 2022-10-19 PROCEDURE — 96366 THER/PROPH/DIAG IV INF ADDON: CPT

## 2022-10-19 PROCEDURE — 93010 ELECTROCARDIOGRAM REPORT: CPT | Mod: ,,, | Performed by: INTERNAL MEDICINE

## 2022-10-19 PROCEDURE — 63600175 PHARM REV CODE 636 W HCPCS: Performed by: STUDENT IN AN ORGANIZED HEALTH CARE EDUCATION/TRAINING PROGRAM

## 2022-10-19 PROCEDURE — 96372 THER/PROPH/DIAG INJ SC/IM: CPT | Mod: 59

## 2022-10-19 PROCEDURE — 25000242 PHARM REV CODE 250 ALT 637 W/ HCPCS

## 2022-10-19 PROCEDURE — 94645 CONT INHLJ TX EACH ADDL HOUR: CPT

## 2022-10-19 PROCEDURE — 25000003 PHARM REV CODE 250: Performed by: STUDENT IN AN ORGANIZED HEALTH CARE EDUCATION/TRAINING PROGRAM

## 2022-10-19 PROCEDURE — 63600175 PHARM REV CODE 636 W HCPCS

## 2022-10-19 PROCEDURE — 93005 ELECTROCARDIOGRAM TRACING: CPT

## 2022-10-19 PROCEDURE — 85025 COMPLETE CBC W/AUTO DIFF WBC: CPT | Mod: 91 | Performed by: STUDENT IN AN ORGANIZED HEALTH CARE EDUCATION/TRAINING PROGRAM

## 2022-10-19 PROCEDURE — 25000003 PHARM REV CODE 250

## 2022-10-19 PROCEDURE — 25000242 PHARM REV CODE 250 ALT 637 W/ HCPCS: Performed by: STUDENT IN AN ORGANIZED HEALTH CARE EDUCATION/TRAINING PROGRAM

## 2022-10-19 PROCEDURE — 83735 ASSAY OF MAGNESIUM: CPT

## 2022-10-19 PROCEDURE — 94664 DEMO&/EVAL PT USE INHALER: CPT | Mod: XB

## 2022-10-19 PROCEDURE — 99900035 HC TECH TIME PER 15 MIN (STAT)

## 2022-10-19 PROCEDURE — 96367 TX/PROPH/DG ADDL SEQ IV INF: CPT

## 2022-10-19 PROCEDURE — 93010 ELECTROCARDIOGRAM REPORT: CPT | Mod: 76,,, | Performed by: INTERNAL MEDICINE

## 2022-10-19 PROCEDURE — S4991 NICOTINE PATCH NONLEGEND: HCPCS

## 2022-10-19 PROCEDURE — 96375 TX/PRO/DX INJ NEW DRUG ADDON: CPT

## 2022-10-19 PROCEDURE — U0002 COVID-19 LAB TEST NON-CDC: HCPCS | Performed by: STUDENT IN AN ORGANIZED HEALTH CARE EDUCATION/TRAINING PROGRAM

## 2022-10-19 PROCEDURE — 87502 INFLUENZA DNA AMP PROBE: CPT | Performed by: STUDENT IN AN ORGANIZED HEALTH CARE EDUCATION/TRAINING PROGRAM

## 2022-10-19 PROCEDURE — 93010 EKG 12-LEAD: ICD-10-PCS | Mod: 76,,, | Performed by: INTERNAL MEDICINE

## 2022-10-19 PROCEDURE — 82803 BLOOD GASES ANY COMBINATION: CPT

## 2022-10-19 PROCEDURE — 99220 PR INITIAL OBSERVATION CARE,LEVL III: CPT | Mod: ,,,

## 2022-10-19 PROCEDURE — 84100 ASSAY OF PHOSPHORUS: CPT

## 2022-10-19 PROCEDURE — 84145 PROCALCITONIN (PCT): CPT

## 2022-10-19 PROCEDURE — 85025 COMPLETE CBC W/AUTO DIFF WBC: CPT

## 2022-10-19 PROCEDURE — 99285 EMERGENCY DEPT VISIT HI MDM: CPT | Mod: 25,CS

## 2022-10-19 PROCEDURE — 83880 ASSAY OF NATRIURETIC PEPTIDE: CPT | Performed by: STUDENT IN AN ORGANIZED HEALTH CARE EDUCATION/TRAINING PROGRAM

## 2022-10-19 PROCEDURE — 96365 THER/PROPH/DIAG IV INF INIT: CPT

## 2022-10-19 PROCEDURE — 80048 BASIC METABOLIC PNL TOTAL CA: CPT | Mod: XB

## 2022-10-19 PROCEDURE — 94761 N-INVAS EAR/PLS OXIMETRY MLT: CPT

## 2022-10-19 PROCEDURE — 27000221 HC OXYGEN, UP TO 24 HOURS

## 2022-10-19 PROCEDURE — 99220 PR INITIAL OBSERVATION CARE,LEVL III: ICD-10-PCS | Mod: ,,,

## 2022-10-19 PROCEDURE — 94644 CONT INHLJ TX 1ST HOUR: CPT

## 2022-10-19 RX ORDER — BISACODYL 10 MG
10 SUPPOSITORY, RECTAL RECTAL DAILY PRN
Status: DISCONTINUED | OUTPATIENT
Start: 2022-10-19 | End: 2022-10-20 | Stop reason: HOSPADM

## 2022-10-19 RX ORDER — GUAIFENESIN 600 MG/1
600 TABLET, EXTENDED RELEASE ORAL 2 TIMES DAILY
Status: DISCONTINUED | OUTPATIENT
Start: 2022-10-19 | End: 2022-10-20 | Stop reason: HOSPADM

## 2022-10-19 RX ORDER — ROFLUMILAST 500 UG/1
500 TABLET ORAL DAILY
Status: DISCONTINUED | OUTPATIENT
Start: 2022-10-19 | End: 2022-10-20 | Stop reason: HOSPADM

## 2022-10-19 RX ORDER — ENOXAPARIN SODIUM 100 MG/ML
40 INJECTION SUBCUTANEOUS EVERY 24 HOURS
Status: DISCONTINUED | OUTPATIENT
Start: 2022-10-19 | End: 2022-10-20 | Stop reason: HOSPADM

## 2022-10-19 RX ORDER — TALC
6 POWDER (GRAM) TOPICAL NIGHTLY PRN
Status: DISCONTINUED | OUTPATIENT
Start: 2022-10-19 | End: 2022-10-20 | Stop reason: HOSPADM

## 2022-10-19 RX ORDER — ACETAMINOPHEN 500 MG
1000 TABLET ORAL EVERY 6 HOURS PRN
Status: DISCONTINUED | OUTPATIENT
Start: 2022-10-19 | End: 2022-10-20 | Stop reason: HOSPADM

## 2022-10-19 RX ORDER — METHYLPREDNISOLONE SOD SUCC 125 MG
125 VIAL (EA) INJECTION
Status: COMPLETED | OUTPATIENT
Start: 2022-10-19 | End: 2022-10-19

## 2022-10-19 RX ORDER — NIFEDIPINE 30 MG/1
30 TABLET, EXTENDED RELEASE ORAL DAILY
Status: DISCONTINUED | OUTPATIENT
Start: 2022-10-19 | End: 2022-10-20 | Stop reason: HOSPADM

## 2022-10-19 RX ORDER — IPRATROPIUM BROMIDE AND ALBUTEROL SULFATE 2.5; .5 MG/3ML; MG/3ML
3 SOLUTION RESPIRATORY (INHALATION) EVERY 4 HOURS PRN
Status: DISCONTINUED | OUTPATIENT
Start: 2022-10-19 | End: 2022-10-20 | Stop reason: HOSPADM

## 2022-10-19 RX ORDER — SODIUM,POTASSIUM PHOSPHATES 280-250MG
1 POWDER IN PACKET (EA) ORAL 2 TIMES DAILY
Status: COMPLETED | OUTPATIENT
Start: 2022-10-19 | End: 2022-10-19

## 2022-10-19 RX ORDER — FLUTICASONE FUROATE AND VILANTEROL 100; 25 UG/1; UG/1
1 POWDER RESPIRATORY (INHALATION) DAILY
Status: DISCONTINUED | OUTPATIENT
Start: 2022-10-19 | End: 2022-10-20 | Stop reason: HOSPADM

## 2022-10-19 RX ORDER — THIAMINE HCL 100 MG
100 TABLET ORAL DAILY
Status: DISCONTINUED | OUTPATIENT
Start: 2022-10-19 | End: 2022-10-20 | Stop reason: HOSPADM

## 2022-10-19 RX ORDER — POLYETHYLENE GLYCOL 3350 17 G/17G
17 POWDER, FOR SOLUTION ORAL DAILY
Status: DISCONTINUED | OUTPATIENT
Start: 2022-10-19 | End: 2022-10-20 | Stop reason: HOSPADM

## 2022-10-19 RX ORDER — AZITHROMYCIN 250 MG/1
500 TABLET, FILM COATED ORAL EVERY 24 HOURS
Status: DISCONTINUED | OUTPATIENT
Start: 2022-10-20 | End: 2022-10-20 | Stop reason: HOSPADM

## 2022-10-19 RX ORDER — ONDANSETRON 8 MG/1
8 TABLET, ORALLY DISINTEGRATING ORAL EVERY 8 HOURS PRN
Status: DISCONTINUED | OUTPATIENT
Start: 2022-10-19 | End: 2022-10-20 | Stop reason: HOSPADM

## 2022-10-19 RX ORDER — MAGNESIUM SULFATE HEPTAHYDRATE 40 MG/ML
2 INJECTION, SOLUTION INTRAVENOUS ONCE
Status: COMPLETED | OUTPATIENT
Start: 2022-10-19 | End: 2022-10-19

## 2022-10-19 RX ORDER — NICOTINE 7MG/24HR
1 PATCH, TRANSDERMAL 24 HOURS TRANSDERMAL DAILY
Status: DISCONTINUED | OUTPATIENT
Start: 2022-10-19 | End: 2022-10-20 | Stop reason: HOSPADM

## 2022-10-19 RX ORDER — BISMUTH SUBSALICYLATE 525 MG/30ML
30 LIQUID ORAL DAILY PRN
COMMUNITY

## 2022-10-19 RX ORDER — POTASSIUM CHLORIDE 20 MEQ/1
20 TABLET, EXTENDED RELEASE ORAL ONCE
Status: DISCONTINUED | OUTPATIENT
Start: 2022-10-19 | End: 2022-10-19

## 2022-10-19 RX ORDER — MAGNESIUM SULFATE HEPTAHYDRATE 40 MG/ML
4 INJECTION, SOLUTION INTRAVENOUS ONCE
Status: COMPLETED | OUTPATIENT
Start: 2022-10-19 | End: 2022-10-19

## 2022-10-19 RX ORDER — LANOLIN ALCOHOL/MO/W.PET/CERES
1 CREAM (GRAM) TOPICAL DAILY
Status: DISCONTINUED | OUTPATIENT
Start: 2022-10-19 | End: 2022-10-20 | Stop reason: HOSPADM

## 2022-10-19 RX ORDER — ACETAMINOPHEN 500 MG
1000 TABLET ORAL
Status: COMPLETED | OUTPATIENT
Start: 2022-10-19 | End: 2022-10-19

## 2022-10-19 RX ORDER — SODIUM CHLORIDE 0.9 % (FLUSH) 0.9 %
3 SYRINGE (ML) INJECTION
Status: DISCONTINUED | OUTPATIENT
Start: 2022-10-19 | End: 2022-10-20 | Stop reason: HOSPADM

## 2022-10-19 RX ORDER — IPRATROPIUM BROMIDE AND ALBUTEROL SULFATE 2.5; .5 MG/3ML; MG/3ML
3 SOLUTION RESPIRATORY (INHALATION)
Status: COMPLETED | OUTPATIENT
Start: 2022-10-19 | End: 2022-10-19

## 2022-10-19 RX ORDER — POTASSIUM CHLORIDE 20 MEQ/1
40 TABLET, EXTENDED RELEASE ORAL 3 TIMES DAILY
Status: COMPLETED | OUTPATIENT
Start: 2022-10-19 | End: 2022-10-19

## 2022-10-19 RX ORDER — KETOROLAC TROMETHAMINE 30 MG/ML
30 INJECTION, SOLUTION INTRAMUSCULAR; INTRAVENOUS EVERY 6 HOURS PRN
Status: DISCONTINUED | OUTPATIENT
Start: 2022-10-19 | End: 2022-10-20 | Stop reason: HOSPADM

## 2022-10-19 RX ORDER — FOLIC ACID 1 MG/1
1 TABLET ORAL DAILY
Status: DISCONTINUED | OUTPATIENT
Start: 2022-10-19 | End: 2022-10-20 | Stop reason: HOSPADM

## 2022-10-19 RX ORDER — FUROSEMIDE 20 MG/1
40 TABLET ORAL DAILY
Status: DISCONTINUED | OUTPATIENT
Start: 2022-10-19 | End: 2022-10-20 | Stop reason: HOSPADM

## 2022-10-19 RX ORDER — IPRATROPIUM BROMIDE AND ALBUTEROL SULFATE 2.5; .5 MG/3ML; MG/3ML
3 SOLUTION RESPIRATORY (INHALATION)
Status: DISCONTINUED | OUTPATIENT
Start: 2022-10-19 | End: 2022-10-20 | Stop reason: HOSPADM

## 2022-10-19 RX ORDER — PREDNISONE 20 MG/1
60 TABLET ORAL DAILY
Status: DISCONTINUED | OUTPATIENT
Start: 2022-10-19 | End: 2022-10-20 | Stop reason: HOSPADM

## 2022-10-19 RX ORDER — ONDANSETRON 2 MG/ML
4 INJECTION INTRAMUSCULAR; INTRAVENOUS EVERY 12 HOURS PRN
Status: DISCONTINUED | OUTPATIENT
Start: 2022-10-19 | End: 2022-10-20 | Stop reason: HOSPADM

## 2022-10-19 RX ORDER — HYDROCODONE BITARTRATE AND ACETAMINOPHEN 5; 325 MG/1; MG/1
1 TABLET ORAL EVERY 6 HOURS PRN
Status: DISCONTINUED | OUTPATIENT
Start: 2022-10-19 | End: 2022-10-20 | Stop reason: HOSPADM

## 2022-10-19 RX ADMIN — NICOTINE 1 PATCH: 7 PATCH, EXTENDED RELEASE TRANSDERMAL at 10:10

## 2022-10-19 RX ADMIN — POTASSIUM & SODIUM PHOSPHATES POWDER PACK 280-160-250 MG 1 PACKET: 280-160-250 PACK at 09:10

## 2022-10-19 RX ADMIN — ACETAMINOPHEN 1000 MG: 500 TABLET ORAL at 05:10

## 2022-10-19 RX ADMIN — FOLIC ACID 1 MG: 1 TABLET ORAL at 10:10

## 2022-10-19 RX ADMIN — POTASSIUM BICARBONATE 40 MEQ: 391 TABLET, EFFERVESCENT ORAL at 04:10

## 2022-10-19 RX ADMIN — FUROSEMIDE 40 MG: 40 TABLET ORAL at 10:10

## 2022-10-19 RX ADMIN — THERA TABS 1 TABLET: TAB at 10:10

## 2022-10-19 RX ADMIN — AZITHROMYCIN 500 MG: 500 INJECTION, POWDER, LYOPHILIZED, FOR SOLUTION INTRAVENOUS at 02:10

## 2022-10-19 RX ADMIN — IPRATROPIUM BROMIDE AND ALBUTEROL SULFATE 3 ML: 2.5; .5 SOLUTION RESPIRATORY (INHALATION) at 07:10

## 2022-10-19 RX ADMIN — IPRATROPIUM BROMIDE AND ALBUTEROL SULFATE 3 ML: 2.5; .5 SOLUTION RESPIRATORY (INHALATION) at 02:10

## 2022-10-19 RX ADMIN — GUAIFENESIN 600 MG: 600 TABLET, EXTENDED RELEASE ORAL at 11:10

## 2022-10-19 RX ADMIN — PREDNISONE 60 MG: 20 TABLET ORAL at 10:10

## 2022-10-19 RX ADMIN — POLYETHYLENE GLYCOL 3350 17 G: 17 POWDER, FOR SOLUTION ORAL at 10:10

## 2022-10-19 RX ADMIN — ENOXAPARIN SODIUM 40 MG: 100 INJECTION SUBCUTANEOUS at 04:10

## 2022-10-19 RX ADMIN — CEFTRIAXONE 1 G: 1 INJECTION, SOLUTION INTRAVENOUS at 04:10

## 2022-10-19 RX ADMIN — ACETAMINOPHEN 1000 MG: 500 TABLET ORAL at 02:10

## 2022-10-19 RX ADMIN — POTASSIUM CHLORIDE 40 MEQ: 1500 TABLET, EXTENDED RELEASE ORAL at 11:10

## 2022-10-19 RX ADMIN — POTASSIUM CHLORIDE 40 MEQ: 1500 TABLET, EXTENDED RELEASE ORAL at 09:10

## 2022-10-19 RX ADMIN — FERROUS SULFATE TAB 325 MG (65 MG ELEMENTAL FE) 1 EACH: 325 (65 FE) TAB at 10:10

## 2022-10-19 RX ADMIN — METHYLPREDNISOLONE SODIUM SUCCINATE 125 MG: 125 INJECTION, POWDER, FOR SOLUTION INTRAMUSCULAR; INTRAVENOUS at 02:10

## 2022-10-19 RX ADMIN — MAGNESIUM SULFATE 4 G: 2 INJECTION INTRAVENOUS at 11:10

## 2022-10-19 RX ADMIN — FLUTICASONE FUROATE AND VILANTEROL TRIFENATATE 1 PUFF: 100; 25 POWDER RESPIRATORY (INHALATION) at 07:10

## 2022-10-19 RX ADMIN — IPRATROPIUM BROMIDE AND ALBUTEROL SULFATE 3 ML: 2.5; .5 SOLUTION RESPIRATORY (INHALATION) at 05:10

## 2022-10-19 RX ADMIN — Medication 100 MG: at 10:10

## 2022-10-19 RX ADMIN — NIFEDIPINE 30 MG: 30 TABLET, FILM COATED, EXTENDED RELEASE ORAL at 10:10

## 2022-10-19 RX ADMIN — POTASSIUM & SODIUM PHOSPHATES POWDER PACK 280-160-250 MG 1 PACKET: 280-160-250 PACK at 10:10

## 2022-10-19 RX ADMIN — POTASSIUM CHLORIDE 40 MEQ: 1500 TABLET, EXTENDED RELEASE ORAL at 04:10

## 2022-10-19 RX ADMIN — MAGNESIUM SULFATE 2 G: 2 INJECTION INTRAVENOUS at 05:10

## 2022-10-19 RX ADMIN — GUAIFENESIN 600 MG: 600 TABLET, EXTENDED RELEASE ORAL at 09:10

## 2022-10-19 NOTE — HPI
Baltazar Mccray is a 62 y.o. M with COPD on 2L NC at baseline, HTN, nicotine dependence, and alcohol use presenting to the ED for worsening SOB and dry cough since yesterday. Pt reports he was in his usual state of health until his nebulizer machine broke and he was unable to have his scheduled breathing treatments. At that time, he became increasingly more SOB and his baseline cough was much more frequent. He also endorses chest tightness (improved s/p duonebs), intermittent mild generalized headaches without photophobia or phonophobia, decreased appetite, and fatigue. Pt denies fever, chills, chest pain, palpitations, abdominal pain, N/V/D, dysuria, leg swelling or pain, confusion, syncope, or recent sick contacts.      Of note, pt was admitted with similar symptoms and discharged on 9/28/22 with pulmonology referral, COPD clinic f/u and care at home. Pt reports he ran out of his PRN albuterol and is almost out of his antihypertensives.       In the ED: Tachycaric to 115, RR 37, 92% on 2L NC. CBC with stable anemia. CMP with K+ 3. Troponin 0.027. EKG with sinus tachycardia, no STEMI. VBG with pH 7.419, pCO2 68.2, pO2 26, HCO3 44. Flu & COVID negative. CXR consistent with known COPD, no apparent consolidation, pleural effusion, or pneumothorax. Pt was given tylenol, IV solumedrol, duonebs x 3, azithromycin 500, and potassium.

## 2022-10-19 NOTE — ED NOTES
Report received from DONNY Brand and care assumed at this time. Patient resting in stretcher with NAD noted. Patient declines any needs at this time.

## 2022-10-19 NOTE — PLAN OF CARE
"Initial Discharge Planning Case Management Assessment:    Patient admitted on 10-19-22  Chart reviewed  Care plan discussed with treatment team,    attending Dr Gonsales  PCP updated in Epic: self reports he needs a new PCP    DME at home: r/w, nebulizer, Cpap  Current dispo: admitted to [address] today  Self reports "my nebulizer is broken I need  a new nebulizer please"  Needs nebulizer repaired or replaced. DME that follows at home- [facility] D[facility]    Transportation: limited  Consults following: case   Case management to follow    [facility] - Int[facility] Care ([place] [address])  Initial Discharge Assessment       Primary Care Provider: [facility]    Admission Diagnosis: Shortness of breath [R06.02]  Tachycardia [R00.0]  Hypoxia [R09.02]  COPD exacerbation [J44.1]  Acute on chronic respiratory failure with hypoxia and hypercapnia [J96.21, J96.22]    Admission Date: 10/19/2022  Expected Discharge Date: 10/20/2022    Discharge Barriers Identified: (P) None    Payor: HU[employer] MANAGED MEDICARE / Plan: [employer] MEDICARE [facility] / Product Type: Medicare Advantage /     Extended Emergency Contact Information  Primary Emergency Contact: Gladis Mccray   [place]  Mobile Phone: [phone]  Relation: Sister  Secondary Emergency Contact: Viry Mccray   [place]  Mobile Phone: [phone]  Relation: Sister    Discharge Plan A: (P) Home         [facility]  [address]  [place] [address]  Phone: [phone] Fax: [phone]    [id number] [place]  [facility] [address] AT [facility] [place] [address] [place] [place] [address]  Phone: [phone] Fax: [phone]      Initial Assessment (most recent)       Adult Discharge Assessment - 10/19/22 1814          Discharge Assessment    Assessment Type Discharge Planning Assessment (P)      Confirmed/corrected address, phone number and insurance Yes (P)      Confirmed " Demographics Correct on Facesheet (P)      Source of Information patient;health record (P)      Does patient/caregiver understand observation status Yes (P)      Communicated RUTH with patient/caregiver Yes (P)      Lives With alone (P)      Do you expect to return to your current living situation? Yes (P)      Do you have help at home or someone to help you manage your care at home? No (P)      Prior to hospitilization cognitive status: Alert/Oriented (P)      Current cognitive status: Alert/Oriented (P)      Dressing/Bathing Difficulty none (P)      Equipment Currently Used at Home oxygen;nebulizer;CPAP (P)      Patient currently being followed by outpatient case management? No (P)      Do you currently have service(s) that help you manage your care at home? No (P)      Do you take prescription medications? Yes (P)      Do you have prescription coverage? Yes (P)      Do you have any problems affording any of your prescribed medications? No (P)      Is the patient taking medications as prescribed? yes (P)      How do you get to doctors appointments? family or friend will provide;public transportation (P)      Are you on dialysis? No (P)      Discharge Plan A Home (P)      DME Needed Upon Discharge  nebulizer (P)      Discharge Plan discussed with: Patient (P)      Discharge Barriers Identified None (P)

## 2022-10-19 NOTE — CARE UPDATE
RAPID RESPONSE NURSE CHART REVIEW        Chart Reviewed: 10/19/2022, 5:04 AM    MRN: [id number]  Bed: ED 12/12    Dx: Acute on chronic respiratory failure with hypoxia and hypercapnia    Baltazar Mccray has a past medical history of Aspiration pneumonia, Asthma, Atrial fibrillation with rapid ventricular response, CHF (congestive heart failure), COPD (chronic obstructive pulmonary disease), Coronary artery disease, Hypertension, Lung nodule, Pneumonia, and Seizures.    Last VS: /74 (BP Location: Right arm, Patient Position: Lying)   Pulse (!) 115   Temp 98.2 °F (36.8 °C) (Oral)   Resp (!) 37   Wt 63.5 kg (140 lb)   SpO2 (!) 92%   BMI 20.67 kg/m²     24H Vital Sign Range:  Temp:  [98.2 °F (36.8 °C)]   Pulse:  []   Resp:  [22-37]   BP: (126)/(74)   SpO2:  [92 %-100 %]     Level of Consciousness (AVPU): alert    Recent Labs     10/19/22  0212   WBC 8.83   HGB 13.4*   HCT 43.4          Recent Labs     10/19/22  0212      K 3.0*   CL 90*   CO2 34*   CREATININE 0.9   GLU 81        Recent Labs     10/19/22  0249   PH 7.419   PCO2 68.2*   PO2 26*   HCO3 44.1*   POCSATURATED 45*   BE 20        OXYGEN:  Flow (L/min): 2     O2 Device (Oxygen Therapy): nasal cannula    MEWS score: 5    Bedside [place] HINES contacted for intermittent tachypnea per flowsheet data. No additional concerns verbalized at this time. Instructed to call [phone] for further concerns or assistance.    Vangei Bhatia RN

## 2022-10-19 NOTE — H&P
[facility] - Emergency Dept  [facility] Medicine  History & Physical    Patient Name: Baltazar Mccray  MRN: [id number]  Patient Class: OP- Observation  Admission Date: 10/19/2022  Attending Physician: Winnie Nolasco MD   Primary Care Provider: Rhonda Of Katia         Patient information was obtained from patient, past medical records, and ER records.     Subjective:     Principal Problem:Acute on chronic respiratory failure with hypoxia and hypercapnia    Chief Complaint:   Chief Complaint   Patient presents with    Shortness of Breath     COPD. Inhaler not working at home.         HPI: Baltazar Mccray is a 62 y.o. M with COPD on 2L NC at baseline, HTN, nicotine dependence, and alcohol use presenting to the ED for worsening SOB and dry cough since yesterday. Pt reports he was in his usual state of health until his nebulizer machine broke and he was unable to have his scheduled breathing treatments. At that time, he became increasingly more SOB and his baseline cough was much more frequent. He also endorses chest tightness (improved s/p duonebs), intermittent mild generalized headaches without photophobia or phonophobia, decreased appetite, and fatigue. Pt denies fever, chills, chest pain, palpitations, abdominal pain, N/V/D, dysuria, leg swelling or pain, confusion, syncope, or recent sick contacts.      Of note, pt was admitted with similar symptoms and discharged on 9/28/22 with pulmonology referral, COPD clinic f/u and care at home. Pt reports he ran out of his PRN albuterol and is almost out of his antihypertensives.       In the ED: Tachycaric to 115, RR 37, 92% on 2L NC. CBC with stable anemia. CMP with K+ 3. Troponin 0.027. EKG with sinus tachycardia, no STEMI. VBG with pH 7.419, pCO2 68.2, pO2 26, HCO3 44. Flu & COVID negative. CXR consistent with known COPD, no apparent consolidation, pleural effusion, or pneumothorax. Pt was given tylenol, IV solumedrol, duonebs x 3, azithromycin 500, and potassium.        Past Medical History:   Diagnosis Date    Aspiration pneumonia 5/30/2021    Asthma     Atrial fibrillation with rapid ventricular response     CHF (congestive heart failure)     COPD (chronic obstructive pulmonary disease)     home O2 at night only    Coronary artery disease     Hypertension     Lung nodule     Pneumonia     Seizures        Past Surgical History:   Procedure Laterality Date    ESOPHAGOGASTRODUODENOSCOPY N/A 2/11/2022    Procedure: EGD (ESOPHAGOGASTRODUODENOSCOPY);  Surgeon: Cain Ortega MD;  Location: [facility] ENDO (2ND FLR);  Service: Endoscopy;  Laterality: N/A;    ESOPHAGOGASTRODUODENOSCOPY N/A 9/15/2022    Procedure: EGD (ESOPHAGOGASTRODUODENOSCOPY);  Surgeon: Leonid Chavez MD;  Location: [facility] ENDO (2ND FLR);  Service: Endoscopy;  Laterality: N/A;  PA-50 - 2nd floor  vacc-wears 2L o2-inst mail and verbal-clears 4 hrs prior-tb  8/30 pt rescheduled; updated instructions mailed-    LEFT HEART CATHETERIZATION  4/23/2020    Procedure: Left heart cath;  Surgeon: Nic Pollack MD;  Location: [facility] CATH LAB;  Service: Cardiology;;       Review of patient's allergies indicates:   Allergen Reactions    Benazepril Swelling       No current facility-administered medications on file prior to encounter.     Current Outpatient Medications on File Prior to Encounter   Medication Sig    albuterol (PROVENTIL/VENTOLIN HFA) 90 mcg/actuation inhaler Inhale 1-2 puffs into the lungs every 6 (six) hours as needed for Wheezing. Rescue    albuterol-ipratropium (DUO-NEB) 2.5 mg-0.5 mg/3 mL nebulizer solution Take 3 mLs by nebulization every 4 (four) hours as needed for Wheezing or Shortness of Breath. Rescue    budesonide-glycopyr-formoterol (BREZTRI AEROSPHERE) 160-9-4.8 mcg/actuation HFAA Inhale 2 puffs into the lungs 2 (two) times daily. Rinse mouth after use. CONTROL INHALER    ferrous sulfate 325 (65 FE) MG EC tablet Take 1 tablet (325 mg total) by mouth once daily.    folic acid (FOLVITE) 1 MG tablet  Take 1 tablet (1 mg total) by mouth once daily.    furosemide (LASIX) 20 MG tablet Take 2 tablets (40 mg total) by mouth once daily.    ibuprofen (ADVIL,MOTRIN) 200 MG tablet Take 800 mg by mouth daily as needed for Pain.    multivitamin (THERAGRAN) tablet Take 1 tablet by mouth once daily.    nicotine (NICODERM CQ) 7 mg/24 hr Place 1 patch onto the skin once daily.    NIFEdipine (PROCARDIA-XL) 30 MG (OSM) 24 hr tablet Take 1 tablet (30 mg total) by mouth once daily.    roflumilast (DALIRESP) 500 mcg Tab Take 1 tablet (500 mcg total) by mouth once daily.    thiamine 100 MG tablet Take 1 tablet (100 mg total) by mouth once daily.    [DISCONTINUED] tiotropium bromide (SPIRIVA RESPIMAT) 2.5 mcg/actuation inhaler Inhale 2 puffs into the lungs Daily. Controller     Family History       Problem Relation (Age of Onset)    Cancer Father    Hypertension Sister    Stroke Sister, Brother          Tobacco Use    Smoking status: Some Days     Packs/day: 0.25     Types: Cigarettes    Smokeless tobacco: Never    Tobacco comments:     1-2 cigarettes per day   Substance and Sexual Activity    Alcohol use: Yes     Alcohol/week: 2.0 standard drinks     Types: 2 Cans of beer per week     Comment: every night    Drug use: No    Sexual activity: Not Currently     Review of Systems   Constitutional:  Positive for activity change, appetite change and fatigue. Negative for chills and fever.   HENT:  Positive for congestion. Negative for rhinorrhea, sore throat and trouble swallowing.    Eyes:  Negative for photophobia and visual disturbance.   Respiratory:  Positive for cough, chest tightness, shortness of breath and wheezing.    Cardiovascular:  Negative for chest pain, palpitations and leg swelling.   Gastrointestinal:  Negative for abdominal pain, constipation, diarrhea, nausea and vomiting.   Genitourinary:  Negative for dysuria, frequency, hematuria and urgency.   Musculoskeletal:  Negative for arthralgias, back pain and gait problem.    Skin:  Negative for color change and rash.   Neurological:  Positive for headaches. Negative for dizziness, syncope, weakness, light-headedness and numbness.   Psychiatric/Behavioral:  Negative for agitation and confusion. The patient is not nervous/anxious.    Objective:     Vital Signs (Most Recent):  Temp: 98.2 °F (36.8 °C) (10/19/22 0158)  Pulse: (!) 115 (10/19/22 0242)  Resp: (!) 37 (10/19/22 0242)  BP: 126/74 (10/19/22 0158)  SpO2: (!) 92 % (10/19/22 0242)   Vital Signs (24h Range):  Temp:  [98.2 °F (36.8 °C)] 98.2 °F (36.8 °C)  Pulse:  [] 115  Resp:  [22-37] 37  SpO2:  [92 %-100 %] 92 %  BP: (126)/(74) 126/74     Weight: 63.5 kg (140 lb)  Body mass index is 20.67 kg/m².    Physical Exam  Vitals and nursing note reviewed.   Constitutional:       Appearance: He is well-developed.      Comments: On 3L O2 via NC with conversational dyspnea, requiring multiple breaks    HENT:      Head: Normocephalic and atraumatic.      Mouth/Throat:      Mouth: Mucous membranes are moist.   Eyes:      Conjunctiva/sclera: Conjunctivae normal.      Pupils: Pupils are equal, round, and reactive to light.   Cardiovascular:      Rate and Rhythm: Regular rhythm. Tachycardia present.      Heart sounds: Normal heart sounds.   Pulmonary:      Effort: Pulmonary effort is normal. No respiratory distress.      Breath sounds: Wheezing (diffuse) present.      Comments: Decreased air movement  Abdominal:      General: Bowel sounds are normal. There is no distension.      Palpations: Abdomen is soft.      Tenderness: There is no abdominal tenderness.   Musculoskeletal:         General: No tenderness. Normal range of motion.      Cervical back: Normal range of motion and neck supple.      Right lower leg: No edema.      Left lower leg: No edema.   Skin:     General: Skin is warm and dry.      Capillary Refill: Capillary refill takes less than 2 seconds.      Findings: No rash.   Neurological:      Mental Status: He is alert and oriented  to person, place, and time.      Cranial Nerves: No cranial nerve deficit.      Sensory: No sensory deficit.   Psychiatric:         Behavior: Behavior normal.         Thought Content: Thought content normal.         Judgment: Judgment normal.         CRANIAL NERVES     CN III, IV, VI   Pupils are equal, round, and reactive to light.     Significant Labs: All pertinent labs within the past 24 hours have been reviewed.  ABGs:   Recent Labs   Lab 10/19/22  0249   PH 7.419   PCO2 68.2*   HCO3 44.1*   POCSATURATED 45*   BE 20   PO2 26*     CBC:   Recent Labs   Lab 10/19/22  0212   WBC 8.83   HGB 13.4*   HCT 43.4        CMP:   Recent Labs   Lab 10/19/22  0212      K 3.0*   CL 90*   CO2 34*   GLU 81   BUN 4*   CREATININE 0.9   CALCIUM 9.4   PROT 7.4   ALBUMIN 3.4*   BILITOT 0.7   ALKPHOS 94   AST 15   ALT 10   ANIONGAP 12     Troponin:   Recent Labs   Lab 10/19/22  0212   TROPONINI 0.027*       Significant Imaging: I have reviewed all pertinent imaging results/findings within the past 24 hours.    X-Ray Chest 1 View  Narrative: EXAMINATION:  XR CHEST 1 VIEW    CLINICAL HISTORY:  shortness of breath;    TECHNIQUE:  Single frontal view of the chest was performed.    COMPARISON:  Chest radiograph 09/26/2022, CTA chest 08/11/2022    FINDINGS:  Cardiac monitoring leads overlie the chest.  The cardiomediastinal silhouette is unchanged in size and configuration and mediastinal structures are midline.  There is atherosclerosis of the thoracic aorta.  The lungs appear hyperexpanded with diffuse coarse interstitial attenuation which can be seen with underlying COPD/emphysema.  Unchanged focal linear opacity in the right mid lung zone suggestive of atelectasis and/or scarring.  Previously identified pulmonary nodules (including 1.5 cm nodule in the left lower lobe, seen on CTA of 08/11/2022), are not as well delineated radiographically on this single view of the chest.  No confluent airspace consolidation, large volume  of pleural fluid or pneumothorax identified.  Osseous structures are intact degenerative change.  Impression: Please see above.    Electronically signed by: Mary Bermudez MD  Date:    10/19/2022  Time:    02:45   Assessment/Plan:     * Acute on chronic respiratory failure with hypoxia and hypercapnia  COPD exacerbation  Patient with Hypercapnic and Hypoxic Respiratory failure which is Acute on chronic.  He is not on home oxygen. Supplemental oxygen was provided.   Signs/symptoms of respiratory failure include- tachypnea, increased work of breathing and wheezing. Contributing diagnoses includes - CHF and COPD. Labs and images were reviewed. Patient Has recent VBG, which has been reviewed.     - Pt reports increased dyspnea and cough without increased sputum production for 2 days  - Does require supplemental oxygen at baseline 1-2L  - Pulse oximetry 92% on room air/ 2L nasal cannula on admission  - CXR consistent with known COPD, no apparent consolidation, pleural effusion, or pneumothorax.   - Flu and COVID negative  - Procalcitonin pending  - POC Venous Blood Gas result:  pO2 26; pCO2 68.2; pH 7.419;  HCO3 44   - Given IV solumedrol and azithromycin 500 in the ED  - Prednisone 60 mg daily x 5 days  - Start ceftriaxone and azithromycin for CAP coverage   - Continue home daliresp, control inhaler   - Scheduled duonebs, mucinex, mag sulfate x1  - Consider pulmonary rehabilitation at discharge   - CM consulted - pt will need assistance getting a new nebulizer machine, connecting with additional outpatient resources    Hypokalemia  Patient has hypokalemia which is currently uncontrolled.   Last electrolytes reviewed- Recent Labs   Lab 10/19/22  0212   K 3.0*   - Will replace potassium and monitor electrolytes closely.   - Continuous telemetry.    (HFpEF) heart failure with preserved ejection fraction  Patient is identified as having Diastolic (HFpEF) heart failure that is Chronic. CHF is currently controlled. Latest ECHO  performed and demonstrates- Results for orders placed during the hospital encounter of 08/10/22  Echo  Interpretation Summary  · The left ventricle is normal in size with concentric remodeling and normal systolic function.  · The estimated ejection fraction is 55%.  · Indeterminate left ventricular diastolic function.  · There is abnormal septal wall motion.  · Normal right ventricular size with normal right ventricular systolic function.  · Intermediate central venous pressure (8 mmHg).  · Severe left atrial enlargement.  · An interatrial septal aneurysm is present.  Continue Furosemide and monitor clinical status closely. Monitor on telemetry. Patient is off CHF pathway.  Monitor strict Is&Os and daily weights.  Place on fluid restriction of 1.5 L. Continue to stress to patient importance of self efficacy and  on diet for CHF. Last BNP reviewed- and noted below   Recent Labs   Lab 10/19/22  0212   BNP 10         Atrial fibrillation  Patient with Long standing persistent (>12 months) atrial fibrillation which is controlled currently with Calcium Channel Blocker. Patient is currently in sinus tachycardia. [id number] Score: 1. Anticoagulation not indicated due to [id number] of 1.  - Continuous telemetry    Alcohol use disorder, mild, abuse  - Pt reports 1-2 beers every few days, last drink yesterday  - Monitor CIWA    Cigarette nicotine dependence without complication  - Pt reports that he quit smoking months ago and uses daily Nicoderm patches  - Dangers of cigarette smoking, especially with concomitant oxygen use were discussed in detail for ~ 11 minutes  - Nicoderm patch ordered    Nonobstructive atherosclerosis of coronary artery  - Chronic, controlled  - No active chest pain  - Chest tightness improved with duonebs given in the ED    Elevated troponin  - Troponin elevated to 0.027  - Chronic issue, at baseline  - EKG without STEMI     Essential hypertension  - Controlled on admission   - Latest BP and  vitals reviewed  - Continue home meds for HTN:   Hypertension Medications               furosemide (LASIX) 20 MG tablet Take 2 tablets (40 mg total) by mouth once daily.    NIFEdipine (PROCARDIA-XL) 30 MG (OSM) 24 hr tablet Take 1 tablet (30 mg total) by mouth once daily.        - Goal -140. Utilize p.r.n. antihypertensives only if patient's BP >180/110 & develop symptoms of worsening CP or SOB.  - Pt reports need for prescription refill at discharge    VTE Risk Mitigation (From admission, onward)           Ordered     enoxaparin injection 40 mg  Daily         10/19/22 0402     IP VTE HIGH RISK PATIENT  Once         10/19/22 0402     Place sequential compression device  Until discontinued         10/19/22 0402                       DUNG TitusC  Department of Hospital Medicine   [facility] - Emergency Dept

## 2022-10-19 NOTE — ASSESSMENT & PLAN NOTE
- Controlled on admission   - Latest BP and vitals reviewed  - Continue home meds for HTN:   Hypertension Medications             furosemide (LASIX) 20 MG tablet Take 2 tablets (40 mg total) by mouth once daily.    NIFEdipine (PROCARDIA-XL) 30 MG (OSM) 24 hr tablet Take 1 tablet (30 mg total) by mouth once daily.      - Goal -140. Utilize p.r.n. antihypertensives only if patient's BP >180/110 & develop symptoms of worsening CP or SOB.  - Pt reports need for prescription refill at discharge

## 2022-10-19 NOTE — PHARMACY MED REC
"  Admission Medication History     The home medication history was taken by Onelia Ames.    You may go to "Admission" then "Reconcile Home Medications" tabs to review and/or act upon these items.     The home medication list has been updated by the Pharmacy department.   Please read ALL comments highlighted in yellow.   Please address this information as you see fit.    Feel free to contact us if you have any questions or require assistance.      The medications listed below were removed from the home medication list. Please reorder if appropriate:  Patient reports no longer taking the following medication(s):  IBUPROFEN 200 MG TAB    Medications listed below were obtained from: Patient    Current Outpatient Medications on File Prior to Encounter   Medication Sig    albuterol (PROVENTIL/VENTOLIN HFA) 90 mcg/actuation inhaler   Inhale 1-2 puffs into the lungs every 6 (six) hours as needed for Wheezing. Rescue    bismuth subsalicylate (PEPTO BISMOL) 262 mg/15 mL suspension   Take 30 mLs by mouth daily as needed for Indigestion.    budesonide-glycopyr-formoterol (BREZTRI AEROSPHERE) 160-9-4.8 mcg/actuation HFAA   Inhale 2 puffs into the lungs 2 (two) times daily. Rinse mouth after use. CONTROL INHALER    calcium carbonate (TUMS ORAL)   Take 2 tablets by mouth daily as needed (Heartburn).    furosemide (LASIX) 20 MG tablet   Take 2 tablets (40 mg total) by mouth once daily.    nicotine (NICODERM CQ) 7 mg/24 hr   Place 1 patch onto the skin once daily.    NIFEdipine (PROCARDIA-XL) 30 MG (OSM) 24 hr tablet   Take 1 tablet (30 mg total) by mouth once daily.    roflumilast (DALIRESP) 500 mcg Tab   Take 1 tablet (500 mcg total) by mouth once daily.    albuterol-ipratropium (DUO-NEB) 2.5 mg-0.5 mg/3 mL nebulizer solution   Take 3 mLs by nebulization every 4 (four) hours as needed for Wheezing or Shortness of Breath. Rescue    ferrous sulfate 325 (65 FE) MG EC tablet   Take 1 tablet (325 mg total) by mouth once daily.    folic " acid (FOLVITE) 1 MG tablet   Take 1 tablet (1 mg total) by mouth once daily.    multivitamin (THERAGRAN) tablet   Take 1 tablet by mouth once daily.    thiamine 100 MG tablet Take 1 tablet (100 mg total) by mouth once daily.       Potential issues to be addressed PRIOR TO DISCHARGE  Please discuss with the patient barriers to adherence with medication treatment plans    Patient requires education regarding drug therapies     Patient requested refills for the following medications: (FERROUS SULFATE, ALBUTEROL INH, NEBULIZER MACHINE, PROCARDIA-XL, DALIRESP, THIAMINE)    Onelia Ames CPhT  EXT [id number]                .

## 2022-10-19 NOTE — ASSESSMENT & PLAN NOTE
Patient with Long standing persistent (>12 months) atrial fibrillation which is controlled currently with Calcium Channel Blocker. Patient is currently in sinus tachycardia. [id number] Score: 1. Anticoagulation not indicated due to IFHPH1EIHl of 1.  - Continuous telemetry

## 2022-10-19 NOTE — ED NOTES
Assumed care of this patient from DONNY Block. DONNY Conway was in charge of this patient prior. DONNY Conway did not administer the 900am medication that was due. RN acknowledged the orders, but never administered them. When I took over at 1145 am the medications were still not administered. Medication administered at 1146am. Daliresp requested from pharmacy by DONNY Block.

## 2022-10-19 NOTE — ASSESSMENT & PLAN NOTE
COPD exacerbation  Patient with Hypercapnic and Hypoxic Respiratory failure which is Acute on chronic.  He is not on home oxygen. Supplemental oxygen was provided.   Signs/symptoms of respiratory failure include- tachypnea, increased work of breathing and wheezing. Contributing diagnoses includes - CHF and COPD. Labs and images were reviewed. Patient Has recent VBG, which has been reviewed.     - Pt reports increased dyspnea and cough without increased sputum production for 2 days  - Does require supplemental oxygen at baseline 1-2L  - Pulse oximetry 92% on room air/ 2L nasal cannula on admission  - CXR consistent with known COPD, no apparent consolidation, pleural effusion, or pneumothorax.   - Flu and COVID negative  - Procalcitonin pending  - POC Venous Blood Gas result:  pO2 26; pCO2 68.2; pH 7.419;  HCO3 44   - Given IV solumedrol and azithromycin 500 in the ED  - Prednisone 60 mg daily x 5 days  - Start ceftriaxone and azithromycin for CAP coverage   - Continue home daliresp, control inhaler   - Scheduled duonebs, mucinex, mag sulfate x1  - Consider pulmonary rehabilitation at discharge   - CM consulted - pt will need assistance getting a new nebulizer machine, connecting with additional outpatient resources

## 2022-10-19 NOTE — PLAN OF CARE
10/19/22 1819   Post-Acute Status   Post-Acute Authorization HME   HME Status (!) Pending Delivery  (states he needs a new nebulizer)   Discharge Delays None known at this time   Discharge Plan   Discharge Plan A Home

## 2022-10-19 NOTE — ED TRIAGE NOTES
Pt brought to ED via  EMS from home. Pt states that he became SOB tonight after his home nebulizer stopped working. Pt states that he was fine yesterday but has not had any treatments. Pt states that he becomes more SOB on exertion but is fine at rest.

## 2022-10-19 NOTE — ED NOTES
Pt AAOx4. VSS. RR even and unlabored on 2 L NC. Pt reports pain level 0/10. Pt resting in ED bed low locked.

## 2022-10-19 NOTE — ASSESSMENT & PLAN NOTE
Patient is identified as having Diastolic (HFpEF) heart failure that is Chronic. CHF is currently controlled. Latest ECHO performed and demonstrates- Results for orders placed during the hospital encounter of 08/10/22  Echo  Interpretation Summary  · The left ventricle is normal in size with concentric remodeling and normal systolic function.  · The estimated ejection fraction is 55%.  · Indeterminate left ventricular diastolic function.  · There is abnormal septal wall motion.  · Normal right ventricular size with normal right ventricular systolic function.  · Intermediate central venous pressure (8 mmHg).  · Severe left atrial enlargement.  · An interatrial septal aneurysm is present.  Continue Furosemide and monitor clinical status closely. Monitor on telemetry. Patient is off CHF pathway.  Monitor strict Is&Os and daily weights.  Place on fluid restriction of 1.5 L. Continue to stress to patient importance of self efficacy and  on diet for CHF. Last BNP reviewed- and noted below   Recent Labs   Lab 10/19/22  4232   BNP 10

## 2022-10-19 NOTE — ED PROVIDER NOTES
Encounter Date: 10/19/2022       History     Chief Complaint   Patient presents with    Shortness of Breath     COPD. Inhaler not working at home.      Mr. Mccray is a 63 yo male with COPD on 2L, HTN, tobacco/alcohol use presenting for shortness of breath and cough since yesterday.  Patient states that 2 days ago he ran out of his albuterol inhaler and then his nebulizer machine stopped working. He states that he started feeling short of breath yesterday and it worsened throughout the night.  Additionally he had a cough and congestion, he was concerned that he was having a COPD exacerbation and so he came to the emergency department for evaluation. He denies any fever, chills, nausea, vomiting,chest pain or abdominal pain.       Review of patient's allergies indicates:   Allergen Reactions    Benazepril Swelling     Past Medical History:   Diagnosis Date    Aspiration pneumonia 5/30/2021    Asthma     Atrial fibrillation with rapid ventricular response     CHF (congestive heart failure)     COPD (chronic obstructive pulmonary disease)     home O2 at night only    Coronary artery disease     Hypertension     Lung nodule     Pneumonia     Seizures      Past Surgical History:   Procedure Laterality Date    ESOPHAGOGASTRODUODENOSCOPY N/A 2/11/2022    Procedure: EGD (ESOPHAGOGASTRODUODENOSCOPY);  Surgeon: Cain Ortega MD;  Location: [address]);  Service: Endoscopy;  Laterality: N/A;    ESOPHAGOGASTRODUODENOSCOPY N/A 9/15/2022    Procedure: EGD (ESOPHAGOGASTRODUODENOSCOPY);  Surgeon: Leonid Chavez MD;  Location: [address]);  Service: Endoscopy;  Laterality: N/A;  PA-50 - 2nd floor  vacc-wears 2L o2-inst mail and verbal-clears 4 hrs prior-tb  8/30 pt rescheduled; updated instructions mailed-st    LEFT HEART CATHETERIZATION  4/23/2020    Procedure: Left heart cath;  Surgeon: Nic Pollack MD;  Location: [facility] CATH LAB;  Service: Cardiology;;     Family History   Problem Relation Age  of Onset    Cancer Father     Hypertension Sister     Stroke Sister     Stroke Brother     Diabetes Neg Hx     Heart disease Neg Hx      Social History     Tobacco Use    Smoking status: Some Days     Packs/day: 0.25     Types: Cigarettes    Smokeless tobacco: Never    Tobacco comments:     1-2 cigarettes per day   Substance Use Topics    Alcohol use: Yes     Alcohol/week: 2.0 standard drinks     Types: 2 Cans of beer per week     Comment: every night    Drug use: No     Review of Systems   Constitutional:  Positive for fatigue. Negative for chills, diaphoresis and fever.   HENT:  Negative for congestion, rhinorrhea and sore throat.    Eyes:  Negative for visual disturbance.   Respiratory:  Positive for cough and shortness of breath. Negative for chest tightness.    Cardiovascular:  Negative for chest pain.   Gastrointestinal:  Negative for abdominal pain, blood in stool, constipation, diarrhea and vomiting.   Genitourinary:  Negative for dysuria, hematuria and urgency.   Musculoskeletal:  Negative for back pain.   Skin:  Negative for rash.   Neurological:  Negative for seizures and syncope.   Hematological:  Does not bruise/bleed easily.   Psychiatric/Behavioral:  Negative for agitation and hallucinations.      Physical Exam     Initial Vitals [10/19/22 0158]   BP Pulse Resp Temp SpO2   126/74 100 (!) 22 98.2 °F (36.8 °C) 100 %      MAP       --         Physical Exam     Nursing note and vitals reviewed.  Constitutional: Patient appears well-developed and well-nourished. No distress. AxOx3, NAD, well nourished, appears stated age  HENT:   Head: Normocephalic and atraumatic.   Eyes: Conjunctivae and EOM are normal. Pupils are equal, round, and reactive to light. no scleral icterus, no periorbital edema or ecchymosis  Neck: Neck supple. no stridor, no masses, no drooling or voice changes  Normal range of motion.  Cardiovascular:  Tachycardia, regular rhythm, normal heart sounds and intact distal pulses. no  m/r/g  Pulmonary/Chest:  Mild wheezing bilaterally.  Decreased air entry bilaterally  Abdominal: Abdomen is soft. Patient exhibits no distension. There is no abdominal tenderness. no organomegaly, no CVAT  Musculoskeletal:      Cervical back: Normal range of motion and neck supple.   Neurological: Patient is alert and oriented to person, place, and time. No cranial nerve deficit. Gait normal. GCS score is 15. Moving all extremities, gait intact, face grossly symmetric  Skin: Skin is warm and dry.  Ext: no edema, no lesions, rashes, or deformity  Psych: Normal mood/affect,cooperative, well groomed, makes good eye contact        ED Course   Procedures  Labs Reviewed   CBC W/ AUTO DIFFERENTIAL - Abnormal; Notable for the following components:       Result Value    Hemoglobin 13.4 (*)     MCV 80 (*)     MCH 24.8 (*)     MCHC 30.9 (*)     RDW 20.4 (*)     All other components within normal limits   COMPREHENSIVE METABOLIC PANEL - Abnormal; Notable for the following components:    Potassium 3.0 (*)     Chloride 90 (*)     CO2 34 (*)     BUN 4 (*)     Albumin 3.4 (*)     All other components within normal limits   TROPONIN I - Abnormal; Notable for the following components:    Troponin I 0.027 (*)     All other components within normal limits   ISTAT PROCEDURE - Abnormal; Notable for the following components:    POC PCO2 68.2 (*)     POC PO2 26 (*)     POC HCO3 44.1 (*)     POC SATURATED O2 45 (*)     POC TCO2 46 (*)     All other components within normal limits   INFLUENZA A & B BY MOLECULAR   B-TYPE NATRIURETIC PEPTIDE   SARS-COV-2 RNA AMPLIFICATION, QUAL   PROCALCITONIN   BASIC METABOLIC PANEL   MAGNESIUM   PHOSPHORUS   CBC W/ AUTO DIFFERENTIAL   TROPONIN I     EKG Readings: (Independently Interpreted)   Initial Reading: No STEMI. Rhythm: Sinus Tachycardia. Heart Rate: 113.     Imaging Results              X-Ray Chest 1 View (Final result)  Result time 10/19/22 02:45:48      Final result by Mary Bermudez MD (10/19/22  02:45:48)                   Impression:      Please see above.      Electronically signed by: Mary Bermudez MD  Date:    10/19/2022  Time:    02:45               Narrative:    EXAMINATION:  XR CHEST 1 VIEW    CLINICAL HISTORY:  shortness of breath;    TECHNIQUE:  Single frontal view of the chest was performed.    COMPARISON:  Chest radiograph 09/26/2022, CTA chest 08/11/2022    FINDINGS:  Cardiac monitoring leads overlie the chest.  The cardiomediastinal silhouette is unchanged in size and configuration and mediastinal structures are midline.  There is atherosclerosis of the thoracic aorta.  The lungs appear hyperexpanded with diffuse coarse interstitial attenuation which can be seen with underlying COPD/emphysema.  Unchanged focal linear opacity in the right mid lung zone suggestive of atelectasis and/or scarring.  Previously identified pulmonary nodules (including 1.5 cm nodule in the left lower lobe, seen on CTA of 08/11/2022), are not as well delineated radiographically on this single view of the chest.  No confluent airspace consolidation, large volume of pleural fluid or pneumothorax identified.  Osseous structures are intact degenerative change.                                       Medications   albuterol-ipratropium 2.5 mg-0.5 mg/3 mL nebulizer solution 3 mL (has no administration in time range)   ferrous sulfate tablet 1 each (has no administration in time range)   folic acid tablet 1 mg (has no administration in time range)   furosemide tablet 40 mg (has no administration in time range)   multivitamin tablet (has no administration in time range)   nicotine 7 mg/24 hr 1 patch (has no administration in time range)   NIFEdipine 24 hr tablet 30 mg (has no administration in time range)   roflumilast (DALIRESP) tablet 500 mcg (has no administration in time range)   thiamine tablet 100 mg (has no administration in time range)   guaiFENesin 12 hr tablet 600 mg (has no administration in time range)   magnesium  sulfate 2g in water 50mL IVPB (premix) (has no administration in time range)   sodium chloride 0.9% flush 3 mL (has no administration in time range)   predniSONE tablet 60 mg (has no administration in time range)   albuterol-ipratropium 2.5 mg-0.5 mg/3 mL nebulizer solution 3 mL (has no administration in time range)   cefTRIAXone (ROCEPHIN) 1 g/50 mL D5W IVPB (1 g Intravenous New Bag 10/19/22 0450)     And   azithromycin tablet 500 mg (has no administration in time range)   enoxaparin injection 40 mg (has no administration in time range)   fluticasone furoate-vilanteroL 100-25 mcg/dose diskus inhaler 1 puff (has no administration in time range)   melatonin tablet 6 mg (has no administration in time range)   ondansetron disintegrating tablet 8 mg (has no administration in time range)   ondansetron injection 4 mg (has no administration in time range)   polyethylene glycol packet 17 g (has no administration in time range)   bisacodyL suppository 10 mg (has no administration in time range)   acetaminophen tablet 1,000 mg (has no administration in time range)   HYDROcodone-acetaminophen 5-325 mg per tablet 1 tablet (has no administration in time range)   ketorolac injection 30 mg (has no administration in time range)   albuterol-ipratropium 2.5 mg-0.5 mg/3 mL nebulizer solution 3 mL (3 mLs Nebulization Given 10/19/22 0242)   methylPREDNISolone sodium succinate injection 125 mg (125 mg Intravenous Given 10/19/22 0223)   azithromycin 500 mg in dextrose 5 % 250 mL IVPB (ready to mix system) (0 mg Intravenous Stopped 10/19/22 0342)   acetaminophen tablet 1,000 mg (1,000 mg Oral Given 10/19/22 0241)   potassium bicarbonate disintegrating tablet 40 mEq (40 mEq Oral Given 10/19/22 0440)     Medical Decision Making:   History:   Old Medical Records: I decided to obtain old medical records.  Old Records Summarized: records from previous admission(s).  Initial Assessment:   Mr. Mccray is a 63 yo male with COPD on 2L, HTN,  tobacco/alcohol use presenting for shortness of breath and cough since yesterday.   Differential Diagnosis:   -COPD exacerbation  -CHF   -PNA  -ACS  -Pulmonary Embolism  -Electrolyte abnormalities      Clinical Tests:   Lab Tests: Ordered and Reviewed  Radiological Study: Ordered and Reviewed  ED Management:  Patient was thoroughly examined  EKG Showed Regular rate and rhythm.    He had bilateral wheezing, and so he was given DuoNeb treatment, Azithromycin and Solumedrol.   Labs were obtained which were significant for elevated troponin which was consistent with priors  Chest x-ray did not show pneumonia  On re-evaluation patient was feeling better but given his increased oxygen requirement and initial presentation I felt that he needed to come to the hospital for further workup.  Discussion was had with medicine and he was admitted for COPD exacerbation.               Attending Attestation:   Physician Attestation Statement for Resident:  As the supervising MD   Physician Attestation Statement: I have personally seen and examined this patient.   I agree with the above history.  -:   As the supervising MD I agree with the above PE.     As the supervising MD I agree with the above treatment, course, plan, and disposition.    I have reviewed and agree with the residents interpretation of the following: lab data, EKG and x-rays.  I have reviewed the following: old records at this facility.                           Clinical Impression:   Final diagnoses:  [R06.02] Shortness of breath  [J44.1] COPD exacerbation (Primary)  [R09.02] Hypoxia        ED Disposition Condition    Observation Stable                Neris Cash MD  Resident  10/19/22 [id number]       Vangie Sharpe MD  10/19/22 1604

## 2022-10-19 NOTE — ASSESSMENT & PLAN NOTE
- Chronic, controlled  - No active chest pain  - Chest tightness improved with duonebs given in the ED

## 2022-10-19 NOTE — ASSESSMENT & PLAN NOTE
Patient has hypokalemia which is currently uncontrolled.   Last electrolytes reviewed- Recent Labs   Lab 10/19/22  0212   K 3.0*   - Will replace potassium and monitor electrolytes closely.   - Continuous telemetry.

## 2022-10-19 NOTE — PLAN OF CARE
Hospital Medicine Plan of Care Note    Admission H&P dated earlier this morning reviewed, and agree with assessment and plan as documented. Pt seen and examined this morning on rounds, HOLLI.     Symptomatically improving. Back on home 2L O2. Discussed electrolyte replacement and monitoring overnight for likely discharge tomorrow. Continue current plan.    Davis Gonsales MD  Attending Physician  Department of Hospital Medicine  Epic secure chat preferred, or ext. [phone]  10/19/2022

## 2022-10-19 NOTE — ASSESSMENT & PLAN NOTE
- Pt reports that he quit smoking months ago and uses daily Nicoderm patches  - Dangers of cigarette smoking, especially with concomitant oxygen use were discussed in detail for ~ 11 minutes  - Nicoderm patch ordered

## 2022-10-19 NOTE — SUBJECTIVE & OBJECTIVE
Past Medical History:   Diagnosis Date    Aspiration pneumonia 5/30/2021    Asthma     Atrial fibrillation with rapid ventricular response     CHF (congestive heart failure)     COPD (chronic obstructive pulmonary disease)     home O2 at night only    Coronary artery disease     Hypertension     Lung nodule     Pneumonia     Seizures        Past Surgical History:   Procedure Laterality Date    ESOPHAGOGASTRODUODENOSCOPY N/A 2/11/2022    Procedure: EGD (ESOPHAGOGASTRODUODENOSCOPY);  Surgeon: Cain Ortega MD;  Location: [facility] ENDO (2ND FLR);  Service: Endoscopy;  Laterality: N/A;    ESOPHAGOGASTRODUODENOSCOPY N/A 9/15/2022    Procedure: EGD (ESOPHAGOGASTRODUODENOSCOPY);  Surgeon: Leonid Chavez MD;  Location: [facility] ENDO (2ND FLR);  Service: Endoscopy;  Laterality: N/A;  PA-50 - 2nd floor  vacc-wears 2L o2-inst mail and verbal-clears 4 hrs prior-tb  8/30 pt rescheduled; updated instructions mailed-    LEFT HEART CATHETERIZATION  4/23/2020    Procedure: Left heart cath;  Surgeon: Nic Pollack MD;  Location: [facility] CATH LAB;  Service: Cardiology;;       Review of patient's allergies indicates:   Allergen Reactions    Benazepril Swelling       No current facility-administered medications on file prior to encounter.     Current Outpatient Medications on File Prior to Encounter   Medication Sig    albuterol (PROVENTIL/VENTOLIN HFA) 90 mcg/actuation inhaler Inhale 1-2 puffs into the lungs every 6 (six) hours as needed for Wheezing. Rescue    albuterol-ipratropium (DUO-NEB) 2.5 mg-0.5 mg/3 mL nebulizer solution Take 3 mLs by nebulization every 4 (four) hours as needed for Wheezing or Shortness of Breath. Rescue    budesonide-glycopyr-formoterol (BREZTRI AEROSPHERE) 160-9-4.8 mcg/actuation HFAA Inhale 2 puffs into the lungs 2 (two) times daily. Rinse mouth after use. CONTROL INHALER    ferrous sulfate 325 (65 FE) MG EC tablet Take 1 tablet (325 mg total) by mouth once daily.    folic acid (FOLVITE) 1 MG tablet Take 1  tablet (1 mg total) by mouth once daily.    furosemide (LASIX) 20 MG tablet Take 2 tablets (40 mg total) by mouth once daily.    ibuprofen (ADVIL,MOTRIN) 200 MG tablet Take 800 mg by mouth daily as needed for Pain.    multivitamin (THERAGRAN) tablet Take 1 tablet by mouth once daily.    nicotine (NICODERM CQ) 7 mg/24 hr Place 1 patch onto the skin once daily.    NIFEdipine (PROCARDIA-XL) 30 MG (OSM) 24 hr tablet Take 1 tablet (30 mg total) by mouth once daily.    roflumilast (DALIRESP) 500 mcg Tab Take 1 tablet (500 mcg total) by mouth once daily.    thiamine 100 MG tablet Take 1 tablet (100 mg total) by mouth once daily.    [DISCONTINUED] tiotropium bromide (SPIRIVA RESPIMAT) 2.5 mcg/actuation inhaler Inhale 2 puffs into the lungs Daily. Controller     Family History       Problem Relation (Age of Onset)    Cancer Father    Hypertension Sister    Stroke Sister, Brother          Tobacco Use    Smoking status: Some Days     Packs/day: 0.25     Types: Cigarettes    Smokeless tobacco: Never    Tobacco comments:     1-2 cigarettes per day   Substance and Sexual Activity    Alcohol use: Yes     Alcohol/week: 2.0 standard drinks     Types: 2 Cans of beer per week     Comment: every night    Drug use: No    Sexual activity: Not Currently     Review of Systems   Constitutional:  Positive for activity change, appetite change and fatigue. Negative for chills and fever.   HENT:  Positive for congestion. Negative for rhinorrhea, sore throat and trouble swallowing.    Eyes:  Negative for photophobia and visual disturbance.   Respiratory:  Positive for cough, chest tightness, shortness of breath and wheezing.    Cardiovascular:  Negative for chest pain, palpitations and leg swelling.   Gastrointestinal:  Negative for abdominal pain, constipation, diarrhea, nausea and vomiting.   Genitourinary:  Negative for dysuria, frequency, hematuria and urgency.   Musculoskeletal:  Negative for arthralgias, back pain and gait problem.   Skin:   Negative for color change and rash.   Neurological:  Positive for headaches. Negative for dizziness, syncope, weakness, light-headedness and numbness.   Psychiatric/Behavioral:  Negative for agitation and confusion. The patient is not nervous/anxious.    Objective:     Vital Signs (Most Recent):  Temp: 98.2 °F (36.8 °C) (10/19/22 0158)  Pulse: (!) 115 (10/19/22 0242)  Resp: (!) 37 (10/19/22 0242)  BP: 126/74 (10/19/22 0158)  SpO2: (!) 92 % (10/19/22 0242)   Vital Signs (24h Range):  Temp:  [98.2 °F (36.8 °C)] 98.2 °F (36.8 °C)  Pulse:  [] 115  Resp:  [22-37] 37  SpO2:  [92 %-100 %] 92 %  BP: (126)/(74) 126/74     Weight: 63.5 kg (140 lb)  Body mass index is 20.67 kg/m².    Physical Exam  Vitals and nursing note reviewed.   Constitutional:       Appearance: He is well-developed.      Comments: On 3L O2 via NC with conversational dyspnea, requiring multiple breaks    HENT:      Head: Normocephalic and atraumatic.      Mouth/Throat:      Mouth: Mucous membranes are moist.   Eyes:      Conjunctiva/sclera: Conjunctivae normal.      Pupils: Pupils are equal, round, and reactive to light.   Cardiovascular:      Rate and Rhythm: Regular rhythm. Tachycardia present.      Heart sounds: Normal heart sounds.   Pulmonary:      Effort: Pulmonary effort is normal. No respiratory distress.      Breath sounds: Wheezing (diffuse) present.      Comments: Decreased air movement  Abdominal:      General: Bowel sounds are normal. There is no distension.      Palpations: Abdomen is soft.      Tenderness: There is no abdominal tenderness.   Musculoskeletal:         General: No tenderness. Normal range of motion.      Cervical back: Normal range of motion and neck supple.      Right lower leg: No edema.      Left lower leg: No edema.   Skin:     General: Skin is warm and dry.      Capillary Refill: Capillary refill takes less than 2 seconds.      Findings: No rash.   Neurological:      Mental Status: He is alert and oriented to  person, place, and time.      Cranial Nerves: No cranial nerve deficit.      Sensory: No sensory deficit.   Psychiatric:         Behavior: Behavior normal.         Thought Content: Thought content normal.         Judgment: Judgment normal.         CRANIAL NERVES     CN III, IV, VI   Pupils are equal, round, and reactive to light.     Significant Labs: All pertinent labs within the past 24 hours have been reviewed.  ABGs:   Recent Labs   Lab 10/19/22  0249   PH 7.419   PCO2 68.2*   HCO3 44.1*   POCSATURATED 45*   BE 20   PO2 26*     CBC:   Recent Labs   Lab 10/19/22  0212   WBC 8.83   HGB 13.4*   HCT 43.4        CMP:   Recent Labs   Lab 10/19/22  0212      K 3.0*   CL 90*   CO2 34*   GLU 81   BUN 4*   CREATININE 0.9   CALCIUM 9.4   PROT 7.4   ALBUMIN 3.4*   BILITOT 0.7   ALKPHOS 94   AST 15   ALT 10   ANIONGAP 12     Troponin:   Recent Labs   Lab 10/19/22  0212   TROPONINI 0.027*       Significant Imaging: I have reviewed all pertinent imaging results/findings within the past 24 hours.    X-Ray Chest 1 View  Narrative: EXAMINATION:  XR CHEST 1 VIEW    CLINICAL HISTORY:  shortness of breath;    TECHNIQUE:  Single frontal view of the chest was performed.    COMPARISON:  Chest radiograph 09/26/2022, CTA chest 08/11/2022    FINDINGS:  Cardiac monitoring leads overlie the chest.  The cardiomediastinal silhouette is unchanged in size and configuration and mediastinal structures are midline.  There is atherosclerosis of the thoracic aorta.  The lungs appear hyperexpanded with diffuse coarse interstitial attenuation which can be seen with underlying COPD/emphysema.  Unchanged focal linear opacity in the right mid lung zone suggestive of atelectasis and/or scarring.  Previously identified pulmonary nodules (including 1.5 cm nodule in the left lower lobe, seen on CTA of 08/11/2022), are not as well delineated radiographically on this single view of the chest.  No confluent airspace consolidation, large volume of  pleural fluid or pneumothorax identified.  Osseous structures are intact degenerative change.  Impression: Please see above.    Electronically signed by: Mary Bermudez MD  Date:    10/19/2022  Time:    02:45

## 2022-10-20 VITALS
SYSTOLIC BLOOD PRESSURE: 141 MMHG | RESPIRATION RATE: 19 BRPM | HEIGHT: 68 IN | BODY MASS INDEX: 19.05 KG/M2 | HEART RATE: 96 BPM | TEMPERATURE: 98 F | DIASTOLIC BLOOD PRESSURE: 75 MMHG | OXYGEN SATURATION: 90 % | WEIGHT: 125.69 LBS

## 2022-10-20 LAB
ANION GAP SERPL CALC-SCNC: 8 MMOL/L (ref 8–16)
BASOPHILS # BLD AUTO: 0.01 K/UL (ref 0–0.2)
BASOPHILS NFR BLD: 0.1 % (ref 0–1.9)
BUN SERPL-MCNC: 10 MG/DL (ref 6–20)
CALCIUM SERPL-MCNC: 9.1 MG/DL (ref 8.7–10.5)
CHLORIDE SERPL-SCNC: 99 MMOL/L (ref 95–110)
CO2 SERPL-SCNC: 28 MMOL/L (ref 23–29)
CREAT SERPL-MCNC: 0.8 MG/DL (ref 0.5–1.4)
DIFFERENTIAL METHOD: ABNORMAL
EOSINOPHIL # BLD AUTO: 0 K/UL (ref 0–0.5)
EOSINOPHIL NFR BLD: 0 % (ref 0–8)
ERYTHROCYTE [DISTWIDTH] IN BLOOD BY AUTOMATED COUNT: 20.2 % (ref 11.5–14.5)
EST. GFR  (NO RACE VARIABLE): >60 ML/MIN/1.73 M^2
GLUCOSE SERPL-MCNC: 104 MG/DL (ref 70–110)
HCT VFR BLD AUTO: 39.9 % (ref 40–54)
HGB BLD-MCNC: 12.4 G/DL (ref 14–18)
IMM GRANULOCYTES # BLD AUTO: 0.09 K/UL (ref 0–0.04)
IMM GRANULOCYTES NFR BLD AUTO: 0.8 % (ref 0–0.5)
LYMPHOCYTES # BLD AUTO: 0.7 K/UL (ref 1–4.8)
LYMPHOCYTES NFR BLD: 6.6 % (ref 18–48)
MCH RBC QN AUTO: 24.5 PG (ref 27–31)
MCHC RBC AUTO-ENTMCNC: 31.1 G/DL (ref 32–36)
MCV RBC AUTO: 79 FL (ref 82–98)
MONOCYTES # BLD AUTO: 0.5 K/UL (ref 0.3–1)
MONOCYTES NFR BLD: 4.2 % (ref 4–15)
NEUTROPHILS # BLD AUTO: 9.4 K/UL (ref 1.8–7.7)
NEUTROPHILS NFR BLD: 88.3 % (ref 38–73)
NRBC BLD-RTO: 0 /100 WBC
PLATELET # BLD AUTO: 332 K/UL (ref 150–450)
PMV BLD AUTO: 10.3 FL (ref 9.2–12.9)
POTASSIUM SERPL-SCNC: 5.2 MMOL/L (ref 3.5–5.1)
RBC # BLD AUTO: 5.06 M/UL (ref 4.6–6.2)
SODIUM SERPL-SCNC: 135 MMOL/L (ref 136–145)
WBC # BLD AUTO: 10.62 K/UL (ref 3.9–12.7)

## 2022-10-20 PROCEDURE — 80048 BASIC METABOLIC PNL TOTAL CA: CPT | Performed by: STUDENT IN AN ORGANIZED HEALTH CARE EDUCATION/TRAINING PROGRAM

## 2022-10-20 PROCEDURE — 85025 COMPLETE CBC W/AUTO DIFF WBC: CPT | Performed by: STUDENT IN AN ORGANIZED HEALTH CARE EDUCATION/TRAINING PROGRAM

## 2022-10-20 PROCEDURE — 27000221 HC OXYGEN, UP TO 24 HOURS

## 2022-10-20 PROCEDURE — 63600175 PHARM REV CODE 636 W HCPCS

## 2022-10-20 PROCEDURE — 36415 COLL VENOUS BLD VENIPUNCTURE: CPT | Performed by: STUDENT IN AN ORGANIZED HEALTH CARE EDUCATION/TRAINING PROGRAM

## 2022-10-20 PROCEDURE — 63700000 PHARM REV CODE 250 ALT 637 W/O HCPCS

## 2022-10-20 PROCEDURE — 99217 PR OBSERVATION CARE DISCHARGE: ICD-10-PCS | Mod: ,,, | Performed by: STUDENT IN AN ORGANIZED HEALTH CARE EDUCATION/TRAINING PROGRAM

## 2022-10-20 PROCEDURE — 94640 AIRWAY INHALATION TREATMENT: CPT | Mod: XB

## 2022-10-20 PROCEDURE — 94640 AIRWAY INHALATION TREATMENT: CPT

## 2022-10-20 PROCEDURE — 25000242 PHARM REV CODE 250 ALT 637 W/ HCPCS

## 2022-10-20 PROCEDURE — 25000003 PHARM REV CODE 250

## 2022-10-20 PROCEDURE — 99217 PR OBSERVATION CARE DISCHARGE: CPT | Mod: ,,, | Performed by: STUDENT IN AN ORGANIZED HEALTH CARE EDUCATION/TRAINING PROGRAM

## 2022-10-20 PROCEDURE — 94761 N-INVAS EAR/PLS OXIMETRY MLT: CPT

## 2022-10-20 PROCEDURE — S4991 NICOTINE PATCH NONLEGEND: HCPCS

## 2022-10-20 PROCEDURE — G0378 HOSPITAL OBSERVATION PER HR: HCPCS

## 2022-10-20 RX ORDER — IPRATROPIUM BROMIDE AND ALBUTEROL SULFATE 2.5; .5 MG/3ML; MG/3ML
3 SOLUTION RESPIRATORY (INHALATION) EVERY 6 HOURS PRN
Qty: 75 ML | Refills: 0 | Status: SHIPPED | OUTPATIENT
Start: 2022-10-20 | End: 2022-10-20

## 2022-10-20 RX ORDER — NIFEDIPINE 30 MG/1
30 TABLET, EXTENDED RELEASE ORAL DAILY
Qty: 30 TABLET | Refills: 1 | Status: ON HOLD | OUTPATIENT
Start: 2022-10-20 | End: 2022-11-21 | Stop reason: SDUPTHER

## 2022-10-20 RX ORDER — IPRATROPIUM BROMIDE AND ALBUTEROL SULFATE 2.5; .5 MG/3ML; MG/3ML
3 SOLUTION RESPIRATORY (INHALATION) EVERY 4 HOURS PRN
Qty: 180 ML | Refills: 2 | Status: SHIPPED | OUTPATIENT
Start: 2022-10-20 | End: 2022-12-21 | Stop reason: SDUPTHER

## 2022-10-20 RX ORDER — FUROSEMIDE 20 MG/1
40 TABLET ORAL DAILY
Qty: 60 TABLET | Refills: 1 | Status: SHIPPED | OUTPATIENT
Start: 2022-10-20 | End: 2022-12-21 | Stop reason: SDUPTHER

## 2022-10-20 RX ORDER — ALBUTEROL SULFATE 90 UG/1
1-2 AEROSOL, METERED RESPIRATORY (INHALATION) EVERY 6 HOURS PRN
Qty: 8.5 G | Refills: 2 | Status: ON HOLD | OUTPATIENT
Start: 2022-10-20 | End: 2022-11-21 | Stop reason: SDUPTHER

## 2022-10-20 RX ORDER — CEFDINIR 300 MG/1
300 CAPSULE ORAL 2 TIMES DAILY
Qty: 8 CAPSULE | Refills: 0 | Status: SHIPPED | OUTPATIENT
Start: 2022-10-20 | End: 2022-10-24

## 2022-10-20 RX ORDER — AZITHROMYCIN 250 MG/1
500 TABLET, FILM COATED ORAL DAILY
Qty: 4 TABLET | Refills: 0 | Status: SHIPPED | OUTPATIENT
Start: 2022-10-20 | End: 2022-10-20 | Stop reason: SDUPTHER

## 2022-10-20 RX ORDER — PREDNISONE 5 MG/1
TABLET ORAL
Qty: 30 TABLET | Refills: 0 | Status: SHIPPED | OUTPATIENT
Start: 2022-10-20 | End: 2022-10-28

## 2022-10-20 RX ORDER — AZITHROMYCIN 250 MG/1
250 TABLET, FILM COATED ORAL DAILY
Qty: 4 TABLET | Refills: 0 | Status: SHIPPED | OUTPATIENT
Start: 2022-10-20 | End: 2022-10-20

## 2022-10-20 RX ORDER — GUAIFENESIN 600 MG/1
600 TABLET, EXTENDED RELEASE ORAL 2 TIMES DAILY
Qty: 20 TABLET | Refills: 0 | Status: SHIPPED | OUTPATIENT
Start: 2022-10-20 | End: 2022-10-30

## 2022-10-20 RX ADMIN — Medication 100 MG: at 09:10

## 2022-10-20 RX ADMIN — ROFLUMILAST 500 MCG: 500 TABLET ORAL at 11:10

## 2022-10-20 RX ADMIN — FERROUS SULFATE TAB 325 MG (65 MG ELEMENTAL FE) 1 EACH: 325 (65 FE) TAB at 09:10

## 2022-10-20 RX ADMIN — FUROSEMIDE 40 MG: 40 TABLET ORAL at 09:10

## 2022-10-20 RX ADMIN — NICOTINE 1 PATCH: 7 PATCH, EXTENDED RELEASE TRANSDERMAL at 09:10

## 2022-10-20 RX ADMIN — NIFEDIPINE 30 MG: 30 TABLET, FILM COATED, EXTENDED RELEASE ORAL at 09:10

## 2022-10-20 RX ADMIN — THERA TABS 1 TABLET: TAB at 09:10

## 2022-10-20 RX ADMIN — PREDNISONE 60 MG: 20 TABLET ORAL at 09:10

## 2022-10-20 RX ADMIN — FOLIC ACID 1 MG: 1 TABLET ORAL at 09:10

## 2022-10-20 RX ADMIN — AZITHROMYCIN MONOHYDRATE 500 MG: 250 TABLET ORAL at 09:10

## 2022-10-20 RX ADMIN — GUAIFENESIN 600 MG: 600 TABLET, EXTENDED RELEASE ORAL at 09:10

## 2022-10-20 RX ADMIN — FLUTICASONE FUROATE AND VILANTEROL TRIFENATATE 1 PUFF: 100; 25 POWDER RESPIRATORY (INHALATION) at 09:10

## 2022-10-20 RX ADMIN — ACETAMINOPHEN 1000 MG: 500 TABLET ORAL at 05:10

## 2022-10-20 RX ADMIN — CEFTRIAXONE 1 G: 1 INJECTION, SOLUTION INTRAVENOUS at 05:10

## 2022-10-20 RX ADMIN — POLYETHYLENE GLYCOL 3350 17 G: 17 POWDER, FOR SOLUTION ORAL at 09:10

## 2022-10-20 RX ADMIN — IPRATROPIUM BROMIDE AND ALBUTEROL SULFATE 3 ML: 2.5; .5 SOLUTION RESPIRATORY (INHALATION) at 09:10

## 2022-10-20 NOTE — PLAN OF CARE
SW assisted with [employer] ride for pt to home on d/c      Giulia Castillo, JEREMIAS, MSW, LMSW, RSW   Case Management  [facility]  Email: [email or web address]

## 2022-10-20 NOTE — PLAN OF CARE
Problem: Adult Inpatient Plan of Care  Goal: Plan of Care Review  Outcome: Ongoing, Progressing     Problem: Respiratory Compromise COPD (Chronic Obstructive Pulmonary Disease)  Goal: Effective Oxygenation and Ventilation  Outcome: Ongoing, Progressing     Problem: Fall Injury Risk  Goal: Absence of Fall and Fall-Related Injury  Outcome: Ongoing, Progressing    Will continue to monitor pt.

## 2022-10-20 NOTE — HOSPITAL COURSE
Pt admitted to [facility] and overall symptomatically improved. Remained stable on home O2 settings. SW/CM enlisted for assistance in getting a new home nebulizer. Pt deemed appropriate for discharge. Plan discussed with pt, who was agreeable and amenable; medications were discussed and reviewed, outpatient follow-up scheduled, ER precautions were given, all questions were answered to the pt's satisfaction, and Mr. Mccray was subsequently discharged.

## 2022-10-20 NOTE — PLAN OF CARE
MD informed BILL pt is med ready for d/c. Pt will need new nebulizer machine for home as his is broken. [employer] reviewed order and pt does not qualify for a new one. Cost transfer form emailed to BILL; BILL completed form and returned to [facility]. Nebulizer delivered to bedside.    BILL updated pts nurse.       10/20/22 1242   Post-Acute Status   Post-Acute Authorization HME   E Status Set-up Complete/Auth obtained   Discharge Delays None known at this time   Discharge Plan   Discharge Plan A Home     Florinda Caraballo LCSW  PRN

## 2022-10-20 NOTE — PLAN OF CARE
[facility] - Intensive Care ([facility])  Discharge Final Note    Primary Care Provider: Jaden    Expected Discharge Date: 10/20/2022    Pt dc to home with nebulizer machine.    Final Discharge Note (most recent)       Final Note - 10/20/22 1431          Final Note    Assessment Type Final Discharge Note     Anticipated Discharge Disposition Home or Self Care     Hospital Resources/Appts/Education Provided Appointments scheduled and added to AVS        Post-Acute Status    Post-Acute Authorization HME     HME Status Set-up Complete/Auth obtained     Discharge Delays None known at this time                          Contact Info       Jaden   Relationship: PCP - General    [address]  [facility] [address]   Phone: [phone]       Next Steps: Go on 10/27/2022    Instructions: hospital follow up appointment at 4pm. Please arrive 10 minutes early, bring ID and insurance care as well as medication list and discharge documents from [facility].          Future Appointments   Date Time Provider Department Center   10/21/2022 10:45 AM [facility] [address] 1   11/23/2022  8:00 AM Susan Rae, NP [address]   12/2/2022 11:00 AM Carlo Pascal MD [facility] Caraballo LCSW  PRN

## 2022-10-20 NOTE — DISCHARGE SUMMARY
[facility] - Intensive Care ([address] Medicine  Discharge Summary      Patient Name: Baltazar Mccray  MRN: [id number]  Patient Class: OP- Observation  Admission Date: 10/19/2022  Hospital Length of Stay: 0 days  Discharge Date and Time:  10/20/2022 10:43 AM  Attending Physician: Davis Gonsales MD   Discharging Provider: Davis Gonsales MD  Primary Care Provider: [facility] Medicine Team: [facility] [place] [facility] MED  Davis Gonsales MD    HPI:   Baltazar Mccray is a 62 y.o. M with COPD on 2L NC at baseline, HTN, nicotine dependence, and alcohol use presenting to the ED for worsening SOB and dry cough since yesterday. Pt reports he was in his usual state of health until his nebulizer machine broke and he was unable to have his scheduled breathing treatments. At that time, he became increasingly more SOB and his baseline cough was much more frequent. He also endorses chest tightness (improved s/p duonebs), intermittent mild generalized headaches without photophobia or phonophobia, decreased appetite, and fatigue. Pt denies fever, chills, chest pain, palpitations, abdominal pain, N/V/D, dysuria, leg swelling or pain, confusion, syncope, or recent sick contacts.      Of note, pt was admitted with similar symptoms and discharged on 9/28/22 with pulmonology referral, COPD clinic f/u and care at home. Pt reports he ran out of his PRN albuterol and is almost out of his antihypertensives.       In the ED: Tachycaric to 115, RR 37, 92% on 2L NC. CBC with stable anemia. CMP with K+ 3. Troponin 0.027. EKG with sinus tachycardia, no STEMI. VBG with pH 7.419, pCO2 68.2, pO2 26, HCO3 44. Flu & COVID negative. CXR consistent with known COPD, no apparent consolidation, pleural effusion, or pneumothorax. Pt was given tylenol, IV solumedrol, duonebs x 3, azithromycin 500, and potassium.       * No surgery found *      Hospital Course:   [place] [facility] [place] overall symptomatically improved. Remained stable on  "home O2 settings. BILL/CM enlisted for assistance in getting a new home nebulizer. Pt deemed appropriate for discharge. Plan discussed with pt, who was agreeable and amenable; medications were discussed and reviewed, outpatient follow-up scheduled, ER precautions were given, all questions were answered to the pt's satisfaction, and Mr. Mccray was subsequently discharged.         Goals of Care Treatment Preferences:  Code Status: Full Code      Consults:     Elevated troponin  - Troponin mildly elevated to 0.027  - Chronic issue, at baseline  - EKG without STEMI       Final Active Diagnoses:    Diagnosis Date Noted POA    PRINCIPAL PROBLEM:  Acute on chronic respiratory failure with hypoxia and hypercapnia [J96.21, J96.22] 01/29/2020 Yes    Hypokalemia [E87.6] 10/19/2022 Yes    (HFpEF) heart failure with preserved ejection fraction [I50.30] 09/11/2022 Yes    Atrial fibrillation [I48.91] 03/05/2022 Yes    Alcohol use disorder, mild, abuse [F10.10]  Yes     Chronic    Cigarette nicotine dependence without complication [F17.210] 04/27/2021 Yes    Nonobstructive atherosclerosis of coronary artery [I25.10] 04/23/2020 Yes     Chronic    Elevated troponin [R77.8] 08/12/2018 Yes    Essential hypertension [I10] 05/19/2017 Yes     Chronic    COPD exacerbation [J44.1] 05/14/2017 Yes      Problems Resolved During this Admission:       Discharged Condition: stable    Disposition: Home or Self Care    Follow Up:   Follow-up Information     Jaden. Go on 10/27/2022.    Why: hospital follow up appointment at 4pm. Please arrive 10 minutes early, bring ID and insurance care as well as medication list and discharge documents from [facility].  Contact information:  [address]  [facility] [address]  [phone]                       Patient Instructions:      NEBULIZER FOR HOME USE     Order Specific Question Answer Comments   Height: 5' 8" (1.727 m)    Weight: 57 kg (125 lb 10.6 oz)  "   Does patient have medical equipment at home? oxygen    Does patient have medical equipment at home? nebulizer    Does patient have medical equipment at home? CPAP    Length of need (1-99 months): 99      Ambulatory referral/consult to Pulmonology   Standing Status: Future   Referral Priority: Routine Referral Type: Consultation   Referral Reason: Specialty Services Required   Requested Specialty: Pulmonary Disease   Number of Visits Requested: 1     Diet Cardiac     Notify your health care provider if you experience any of the following:  increased confusion or weakness     Notify your health care provider if you experience any of the following:  persistent dizziness, light-headedness, or visual disturbances     Notify your health care provider if you experience any of the following:  worsening rash     Notify your health care provider if you experience any of the following:  severe persistent headache     Notify your health care provider if you experience any of the following:  difficulty breathing or increased cough     Notify your health care provider if you experience any of the following:  severe uncontrolled pain     Notify your health care provider if you experience any of the following:  persistent nausea and vomiting or diarrhea     Notify your health care provider if you experience any of the following:  temperature >100.4     Activity as tolerated       Significant Diagnostic Studies: Labs: All labs within the past 24 hours have been reviewed    Pending Diagnostic Studies:     None         Medications:  Reconciled Home Medications:      Medication List      START taking these medications    azithromycin 250 MG tablet  Commonly known as: Z-DIEGO  Take 2 tablets (500 mg total) by mouth once daily. for 4 days     cefdinir 300 MG capsule  Commonly known as: OMNICEF  Take 1 capsule (300 mg total) by mouth 2 (two) times daily. for 4 days     guaiFENesin 600 mg 12 hr tablet  Commonly known as: MUCINEX  Take 1  tablet (600 mg total) by mouth 2 (two) times daily. for 10 days     predniSONE 5 MG tablet  Commonly known as: DELTASONE  Take 8 tablets (40 mg total) by mouth once daily for 2 days, THEN 4 tablets (20 mg total) once daily for 2 days, THEN 2 tablets (10 mg total) once daily for 2 days, THEN 1 tablet (5 mg total) once daily for 2 days.  Start taking on: October 20, 2022        CHANGE how you take these medications    albuterol-ipratropium 2.5 mg-0.5 mg/3 mL nebulizer solution  Commonly known as: DUO-NEB  Use 1 vial (3 mLs) by nebulization every 4 (four) hours as needed for Wheezing or Shortness of Breath. Rescue  What changed: additional instructions        CONTINUE taking these medications    albuterol 90 mcg/actuation inhaler  Commonly known as: PROVENTIL/VENTOLIN HFA  Inhale 1-2 puffs into the lungs every 6 (six) hours as needed for Wheezing. Rescue     bismuth subsalicylate 262 mg/15 mL suspension  Commonly known as: PEPTO BISMOL  Take 30 mLs by mouth daily as needed for Indigestion.     BREZTRI AEROSPHERE 160-9-4.8 mcg/actuation Hfaa  Generic drug: budesonide-glycopyr-formoterol  Inhale 2 puffs into the lungs 2 (two) times daily. Rinse mouth after use. CONTROL INHALER     DALIRESP 500 mcg Tab  Generic drug: roflumilast  Take 1 tablet (500 mcg total) by mouth once daily.     ferrous sulfate 325 (65 FE) MG EC tablet  Take 1 tablet (325 mg total) by mouth once daily.     folic acid 1 MG tablet  Commonly known as: FOLVITE  Take 1 tablet (1 mg total) by mouth once daily.     furosemide 20 MG tablet  Commonly known as: LASIX  Take 2 tablets (40 mg total) by mouth once daily.     nicotine 7 mg/24 hr  Commonly known as: NICODERM CQ  Place 1 patch onto the skin once daily.     NIFEdipine 30 MG (OSM) 24 hr tablet  Commonly known as: PROCARDIA-XL  Take 1 tablet (30 mg total) by mouth once daily.     ONCOVITE tablet  Generic drug: multivitamin  Take 1 tablet by mouth once daily.     thiamine 100 MG tablet  Take 1 tablet  (100 mg total) by mouth once daily.     TUMS ORAL  Take 2 tablets by mouth daily as needed (Heartburn).            Indwelling Lines/Drains at time of discharge:   Lines/Drains/Airways     None                 Time spent on the discharge of patient: 35 minutes         Davis Gonsales MD  Attending Physician  Department of Hospital Medicine  Epic secure chat preferred, or [phone]. [phone]  10/20/2022

## 2022-10-20 NOTE — PLAN OF CARE
Problem: Adult Inpatient Plan of Care  Goal: Plan of Care Review  Outcome: Ongoing, Progressing  Goal: Patient-Specific Goal (Individualized)  Outcome: Ongoing, Progressing  Goal: Absence of Hospital-Acquired Illness or Injury  Outcome: Ongoing, Progressing  Goal: Optimal Comfort and Wellbeing  Outcome: Ongoing, Progressing  Goal: Readiness for Transition of Care  Outcome: Ongoing, Progressing     Problem: Respiratory Compromise COPD (Chronic Obstructive Pulmonary Disease)  Goal: Effective Oxygenation and Ventilation  Outcome: Ongoing, Progressing     Problem: Fall Injury Risk  Goal: Absence of Fall and Fall-Related Injury  Outcome: Ongoing, Progressing

## 2022-10-21 ENCOUNTER — IMMUNIZATION (OUTPATIENT)
Dept: PRIMARY CARE CLINIC | Facility: CLINIC | Age: 62
End: 2022-10-21
Payer: MEDICARE

## 2022-10-21 DIAGNOSIS — Z23 NEED FOR VACCINATION: Primary | ICD-10-CM

## 2022-10-21 DIAGNOSIS — J42 CHRONIC BRONCHITIS, UNSPECIFIED CHRONIC BRONCHITIS TYPE: Chronic | ICD-10-CM

## 2022-10-21 PROCEDURE — 91312 COVID-19, MRNA, LNP-S, BIVALENT BOOSTER, PF, 30 MCG/0.3 ML DOSE: CPT | Mod: PBBFAC | Performed by: INTERNAL MEDICINE

## 2022-10-21 PROCEDURE — 0124A COVID-19, MRNA, LNP-S, BIVALENT BOOSTER, PF, 30 MCG/0.3 ML DOSE: CPT | Mod: PBBFAC | Performed by: INTERNAL MEDICINE

## 2022-10-21 NOTE — PLAN OF CARE
Problem: Adult Inpatient Plan of Care  Goal: Plan of Care Review  Outcome: Adequate for Care Transition     Problem: Infection COPD (Chronic Obstructive Pulmonary Disease)  Goal: Absence of Infection Signs and Symptoms  Outcome: Adequate for Care Transition     Problem: Fall Injury Risk  Goal: Absence of Fall and Fall-Related Injury  Outcome: Adequate for Care Transition    Pt is discharged. Vss. D/c AVS explained and given to pt and pt verbalized understanding. D/c meds and home nebulizer received at bedside. Pt denied pain and discomfort. Pt left unit with in-house transport and CM ordered [employer] to bring pt home.

## 2022-11-18 ENCOUNTER — HOSPITAL ENCOUNTER (INPATIENT)
Facility: HOSPITAL | Age: 62
LOS: 3 days | Discharge: HOME | DRG: 190 | End: 2022-11-21
Attending: EMERGENCY MEDICINE | Admitting: EMERGENCY MEDICINE
Payer: MEDICARE

## 2022-11-18 DIAGNOSIS — R06.02 SHORTNESS OF BREATH: ICD-10-CM

## 2022-11-18 DIAGNOSIS — J44.9 CHRONIC OBSTRUCTIVE PULMONARY DISEASE, UNSPECIFIED COPD TYPE: ICD-10-CM

## 2022-11-18 DIAGNOSIS — R07.9 CHEST PAIN: ICD-10-CM

## 2022-11-18 DIAGNOSIS — J44.1 COPD EXACERBATION: Primary | ICD-10-CM

## 2022-11-18 PROBLEM — R09.02 HYPOXIA: Status: ACTIVE | Noted: 2022-11-18

## 2022-11-18 LAB
ALBUMIN SERPL BCP-MCNC: 3.3 G/DL (ref 3.5–5.2)
ALLENS TEST: ABNORMAL
ALP SERPL-CCNC: 83 U/L (ref 55–135)
ALT SERPL W/O P-5'-P-CCNC: 25 U/L (ref 10–44)
ANION GAP SERPL CALC-SCNC: 11 MMOL/L (ref 8–16)
AST SERPL-CCNC: 34 U/L (ref 10–40)
BASOPHILS # BLD AUTO: 0.05 K/UL (ref 0–0.2)
BASOPHILS NFR BLD: 0.7 % (ref 0–1.9)
BILIRUB SERPL-MCNC: 0.4 MG/DL (ref 0.1–1)
BNP SERPL-MCNC: 54 PG/ML (ref 0–99)
BUN SERPL-MCNC: 4 MG/DL (ref 6–20)
CALCIUM SERPL-MCNC: 9 MG/DL (ref 8.7–10.5)
CHLORIDE SERPL-SCNC: 95 MMOL/L (ref 95–110)
CO2 SERPL-SCNC: 30 MMOL/L (ref 23–29)
CREAT SERPL-MCNC: 0.8 MG/DL (ref 0.5–1.4)
DELSYS: ABNORMAL
DELSYS: ABNORMAL
DIFFERENTIAL METHOD: ABNORMAL
EOSINOPHIL # BLD AUTO: 0.1 K/UL (ref 0–0.5)
EOSINOPHIL NFR BLD: 1.1 % (ref 0–8)
EP: 5
ERYTHROCYTE [DISTWIDTH] IN BLOOD BY AUTOMATED COUNT: 17.4 % (ref 11.5–14.5)
EST. GFR  (NO RACE VARIABLE): >60 ML/MIN/1.73 M^2
FIO2: 35
FLOW: 3
GLUCOSE SERPL-MCNC: 113 MG/DL (ref 70–110)
HCO3 UR-SCNC: 32.4 MMOL/L (ref 24–28)
HCO3 UR-SCNC: 34.3 MMOL/L (ref 24–28)
HCO3 UR-SCNC: 35.5 MMOL/L (ref 24–28)
HCT VFR BLD AUTO: 40.1 % (ref 40–54)
HGB BLD-MCNC: 12.4 G/DL (ref 14–18)
IMM GRANULOCYTES # BLD AUTO: 0.02 K/UL (ref 0–0.04)
IMM GRANULOCYTES NFR BLD AUTO: 0.3 % (ref 0–0.5)
INFLUENZA A, MOLECULAR: NOT DETECTED
INFLUENZA B, MOLECULAR: NOT DETECTED
IP: 10
LYMPHOCYTES # BLD AUTO: 1.5 K/UL (ref 1–4.8)
LYMPHOCYTES NFR BLD: 20.3 % (ref 18–48)
MCH RBC QN AUTO: 24.8 PG (ref 27–31)
MCHC RBC AUTO-ENTMCNC: 30.9 G/DL (ref 32–36)
MCV RBC AUTO: 80 FL (ref 82–98)
MODE: ABNORMAL
MODE: ABNORMAL
MONOCYTES # BLD AUTO: 0.9 K/UL (ref 0.3–1)
MONOCYTES NFR BLD: 12.5 % (ref 4–15)
NEUTROPHILS # BLD AUTO: 4.9 K/UL (ref 1.8–7.7)
NEUTROPHILS NFR BLD: 65.1 % (ref 38–73)
NRBC BLD-RTO: 0 /100 WBC
PCO2 BLDA: 41.1 MMHG (ref 35–45)
PCO2 BLDA: 52.3 MMHG (ref 35–45)
PCO2 BLDA: 70.3 MMHG (ref 35–45)
PH SMN: 7.31 [PH] (ref 7.35–7.45)
PH SMN: 7.4 [PH] (ref 7.35–7.45)
PH SMN: 7.53 [PH] (ref 7.35–7.45)
PLATELET # BLD AUTO: 367 K/UL (ref 150–450)
PMV BLD AUTO: 9.3 FL (ref 9.2–12.9)
PO2 BLDA: 106 MMHG (ref 80–100)
PO2 BLDA: 40 MMHG (ref 40–60)
PO2 BLDA: 60 MMHG (ref 80–100)
POC BE: 12 MMOL/L
POC BE: 8 MMOL/L
POC BE: 9 MMOL/L
POC SATURATED O2: 67 % (ref 95–100)
POC SATURATED O2: 93 % (ref 95–100)
POC SATURATED O2: 98 % (ref 95–100)
POC TCO2: 34 MMOL/L (ref 23–27)
POC TCO2: 36 MMOL/L (ref 23–27)
POC TCO2: 38 MMOL/L (ref 24–29)
POTASSIUM SERPL-SCNC: 3.3 MMOL/L (ref 3.5–5.1)
PROCALCITONIN SERPL IA-MCNC: <0.02 NG/ML
PROT SERPL-MCNC: 7.2 G/DL (ref 6–8.4)
RBC # BLD AUTO: 5.01 M/UL (ref 4.6–6.2)
RSV AG BY MOLECULAR METHOD: NOT DETECTED
SAMPLE: ABNORMAL
SARS-COV-2 RNA RESP QL NAA+PROBE: NOT DETECTED
SITE: ABNORMAL
SODIUM SERPL-SCNC: 136 MMOL/L (ref 136–145)
SP02: 95
SP02: 98
TROPONIN I SERPL DL<=0.01 NG/ML-MCNC: 0.03 NG/ML (ref 0–0.03)
TROPONIN I SERPL DL<=0.01 NG/ML-MCNC: 0.03 NG/ML (ref 0–0.03)
WBC # BLD AUTO: 7.5 K/UL (ref 3.9–12.7)

## 2022-11-18 PROCEDURE — 80053 COMPREHEN METABOLIC PANEL: CPT | Performed by: STUDENT IN AN ORGANIZED HEALTH CARE EDUCATION/TRAINING PROGRAM

## 2022-11-18 PROCEDURE — 94640 AIRWAY INHALATION TREATMENT: CPT | Mod: XB

## 2022-11-18 PROCEDURE — 63600175 PHARM REV CODE 636 W HCPCS

## 2022-11-18 PROCEDURE — 83880 ASSAY OF NATRIURETIC PEPTIDE: CPT | Performed by: STUDENT IN AN ORGANIZED HEALTH CARE EDUCATION/TRAINING PROGRAM

## 2022-11-18 PROCEDURE — 99900035 HC TECH TIME PER 15 MIN (STAT)

## 2022-11-18 PROCEDURE — 94644 CONT INHLJ TX 1ST HOUR: CPT

## 2022-11-18 PROCEDURE — 25000003 PHARM REV CODE 250

## 2022-11-18 PROCEDURE — 99291 PR CRITICAL CARE, E/M 30-74 MINUTES: ICD-10-PCS | Mod: CS,,, | Performed by: EMERGENCY MEDICINE

## 2022-11-18 PROCEDURE — 99291 CRITICAL CARE FIRST HOUR: CPT | Mod: CS,,, | Performed by: EMERGENCY MEDICINE

## 2022-11-18 PROCEDURE — 20600001 HC STEP DOWN PRIVATE ROOM

## 2022-11-18 PROCEDURE — 93010 ELECTROCARDIOGRAM REPORT: CPT | Mod: ,,, | Performed by: INTERNAL MEDICINE

## 2022-11-18 PROCEDURE — 25000242 PHARM REV CODE 250 ALT 637 W/ HCPCS

## 2022-11-18 PROCEDURE — 94761 N-INVAS EAR/PLS OXIMETRY MLT: CPT

## 2022-11-18 PROCEDURE — 84484 ASSAY OF TROPONIN QUANT: CPT | Performed by: STUDENT IN AN ORGANIZED HEALTH CARE EDUCATION/TRAINING PROGRAM

## 2022-11-18 PROCEDURE — 99223 1ST HOSP IP/OBS HIGH 75: CPT | Mod: ,,,

## 2022-11-18 PROCEDURE — 25000003 PHARM REV CODE 250: Performed by: HOSPITALIST

## 2022-11-18 PROCEDURE — 99285 EMERGENCY DEPT VISIT HI MDM: CPT | Mod: 25

## 2022-11-18 PROCEDURE — 94640 AIRWAY INHALATION TREATMENT: CPT

## 2022-11-18 PROCEDURE — 84484 ASSAY OF TROPONIN QUANT: CPT | Mod: 91

## 2022-11-18 PROCEDURE — 93010 EKG 12-LEAD: ICD-10-PCS | Mod: ,,, | Performed by: INTERNAL MEDICINE

## 2022-11-18 PROCEDURE — 94660 CPAP INITIATION&MGMT: CPT

## 2022-11-18 PROCEDURE — 63600175 PHARM REV CODE 636 W HCPCS: Performed by: HOSPITALIST

## 2022-11-18 PROCEDURE — 99223 PR INITIAL HOSPITAL CARE,LEVL III: ICD-10-PCS | Mod: ,,,

## 2022-11-18 PROCEDURE — 84145 PROCALCITONIN (PCT): CPT

## 2022-11-18 PROCEDURE — 85025 COMPLETE CBC W/AUTO DIFF WBC: CPT | Performed by: STUDENT IN AN ORGANIZED HEALTH CARE EDUCATION/TRAINING PROGRAM

## 2022-11-18 PROCEDURE — 93005 ELECTROCARDIOGRAM TRACING: CPT

## 2022-11-18 PROCEDURE — 36600 WITHDRAWAL OF ARTERIAL BLOOD: CPT

## 2022-11-18 PROCEDURE — 27000190 HC CPAP FULL FACE MASK W/VALVE

## 2022-11-18 PROCEDURE — 82803 BLOOD GASES ANY COMBINATION: CPT

## 2022-11-18 PROCEDURE — 25000242 PHARM REV CODE 250 ALT 637 W/ HCPCS: Performed by: STUDENT IN AN ORGANIZED HEALTH CARE EDUCATION/TRAINING PROGRAM

## 2022-11-18 PROCEDURE — 0241U SARS-COV2 (COVID) WITH FLU/RSV BY PCR: CPT | Performed by: STUDENT IN AN ORGANIZED HEALTH CARE EDUCATION/TRAINING PROGRAM

## 2022-11-18 PROCEDURE — 27000221 HC OXYGEN, UP TO 24 HOURS

## 2022-11-18 RX ORDER — ONDANSETRON 8 MG/1
8 TABLET, ORALLY DISINTEGRATING ORAL EVERY 8 HOURS PRN
Status: CANCELLED | OUTPATIENT
Start: 2022-11-18

## 2022-11-18 RX ORDER — MUPIROCIN 20 MG/G
OINTMENT TOPICAL 2 TIMES DAILY
Status: DISCONTINUED | OUTPATIENT
Start: 2022-11-19 | End: 2022-11-21 | Stop reason: HOSPADM

## 2022-11-18 RX ORDER — TALC
6 POWDER (GRAM) TOPICAL NIGHTLY PRN
Status: CANCELLED | OUTPATIENT
Start: 2022-11-18

## 2022-11-18 RX ORDER — SODIUM CHLORIDE 0.9 % (FLUSH) 0.9 %
10 SYRINGE (ML) INJECTION EVERY 12 HOURS PRN
Status: DISCONTINUED | OUTPATIENT
Start: 2022-11-18 | End: 2022-11-21 | Stop reason: HOSPADM

## 2022-11-18 RX ORDER — SENNOSIDES 8.6 MG/1
8.6 TABLET ORAL 2 TIMES DAILY
Status: DISCONTINUED | OUTPATIENT
Start: 2022-11-18 | End: 2022-11-21 | Stop reason: HOSPADM

## 2022-11-18 RX ORDER — ENOXAPARIN SODIUM 100 MG/ML
40 INJECTION SUBCUTANEOUS EVERY 24 HOURS
Status: DISCONTINUED | OUTPATIENT
Start: 2022-11-18 | End: 2022-11-21 | Stop reason: HOSPADM

## 2022-11-18 RX ORDER — ALBUTEROL SULFATE 2.5 MG/.5ML
10 SOLUTION RESPIRATORY (INHALATION) CONTINUOUS
Status: DISCONTINUED | OUTPATIENT
Start: 2022-11-18 | End: 2022-11-18

## 2022-11-18 RX ORDER — NIFEDIPINE 30 MG/1
30 TABLET, EXTENDED RELEASE ORAL DAILY
Status: DISCONTINUED | OUTPATIENT
Start: 2022-11-19 | End: 2022-11-19

## 2022-11-18 RX ORDER — METHYLPREDNISOLONE SOD SUCC 125 MG
125 VIAL (EA) INJECTION
Status: DISCONTINUED | OUTPATIENT
Start: 2022-11-18 | End: 2022-11-18

## 2022-11-18 RX ORDER — MAG HYDROX/ALUMINUM HYD/SIMETH 200-200-20
30 SUSPENSION, ORAL (FINAL DOSE FORM) ORAL 4 TIMES DAILY PRN
Status: DISCONTINUED | OUTPATIENT
Start: 2022-11-18 | End: 2022-11-21 | Stop reason: HOSPADM

## 2022-11-18 RX ORDER — IBUPROFEN 200 MG
24 TABLET ORAL
Status: DISCONTINUED | OUTPATIENT
Start: 2022-11-18 | End: 2022-11-21 | Stop reason: HOSPADM

## 2022-11-18 RX ORDER — SODIUM CHLORIDE 0.9 % (FLUSH) 0.9 %
10 SYRINGE (ML) INJECTION
Status: DISCONTINUED | OUTPATIENT
Start: 2022-11-18 | End: 2022-11-21 | Stop reason: HOSPADM

## 2022-11-18 RX ORDER — IPRATROPIUM BROMIDE AND ALBUTEROL SULFATE 2.5; .5 MG/3ML; MG/3ML
SOLUTION RESPIRATORY (INHALATION)
Status: DISPENSED
Start: 2022-11-18 | End: 2022-11-18

## 2022-11-18 RX ORDER — MAGNESIUM SULFATE HEPTAHYDRATE 40 MG/ML
2 INJECTION, SOLUTION INTRAVENOUS
Status: COMPLETED | OUTPATIENT
Start: 2022-11-18 | End: 2022-11-18

## 2022-11-18 RX ORDER — INSULIN ASPART 100 [IU]/ML
0-5 INJECTION, SOLUTION INTRAVENOUS; SUBCUTANEOUS
Status: DISCONTINUED | OUTPATIENT
Start: 2022-11-18 | End: 2022-11-21 | Stop reason: HOSPADM

## 2022-11-18 RX ORDER — IPRATROPIUM BROMIDE AND ALBUTEROL SULFATE 2.5; .5 MG/3ML; MG/3ML
3 SOLUTION RESPIRATORY (INHALATION)
Status: COMPLETED | OUTPATIENT
Start: 2022-11-18 | End: 2022-11-18

## 2022-11-18 RX ORDER — IPRATROPIUM BROMIDE 0.5 MG/2.5ML
0.5 SOLUTION RESPIRATORY (INHALATION) EVERY 4 HOURS
Status: DISCONTINUED | OUTPATIENT
Start: 2022-11-18 | End: 2022-11-20

## 2022-11-18 RX ORDER — PROCHLORPERAZINE EDISYLATE 5 MG/ML
5 INJECTION INTRAMUSCULAR; INTRAVENOUS EVERY 6 HOURS PRN
Status: DISCONTINUED | OUTPATIENT
Start: 2022-11-18 | End: 2022-11-21 | Stop reason: HOSPADM

## 2022-11-18 RX ORDER — TALC
6 POWDER (GRAM) TOPICAL NIGHTLY PRN
Status: DISCONTINUED | OUTPATIENT
Start: 2022-11-18 | End: 2022-11-21 | Stop reason: HOSPADM

## 2022-11-18 RX ORDER — SODIUM CHLORIDE 9 MG/ML
INJECTION, SOLUTION INTRAVENOUS ONCE
Status: COMPLETED | OUTPATIENT
Start: 2022-11-18 | End: 2022-11-18

## 2022-11-18 RX ORDER — FUROSEMIDE 40 MG/1
40 TABLET ORAL DAILY
Status: DISCONTINUED | OUTPATIENT
Start: 2022-11-19 | End: 2022-11-19

## 2022-11-18 RX ORDER — ONDANSETRON 8 MG/1
8 TABLET, ORALLY DISINTEGRATING ORAL EVERY 8 HOURS PRN
Status: DISCONTINUED | OUTPATIENT
Start: 2022-11-18 | End: 2022-11-21 | Stop reason: HOSPADM

## 2022-11-18 RX ORDER — POLYETHYLENE GLYCOL 3350 17 G/17G
17 POWDER, FOR SOLUTION ORAL 2 TIMES DAILY PRN
Status: DISCONTINUED | OUTPATIENT
Start: 2022-11-18 | End: 2022-11-21 | Stop reason: HOSPADM

## 2022-11-18 RX ORDER — FOLIC ACID 1 MG/1
1 TABLET ORAL DAILY
Status: DISCONTINUED | OUTPATIENT
Start: 2022-11-19 | End: 2022-11-21 | Stop reason: HOSPADM

## 2022-11-18 RX ORDER — SODIUM CHLORIDE 0.9 % (FLUSH) 0.9 %
3 SYRINGE (ML) INJECTION
Status: DISCONTINUED | OUTPATIENT
Start: 2022-11-18 | End: 2022-11-21 | Stop reason: HOSPADM

## 2022-11-18 RX ORDER — IPRATROPIUM BROMIDE AND ALBUTEROL SULFATE 2.5; .5 MG/3ML; MG/3ML
3 SOLUTION RESPIRATORY (INHALATION)
Status: DISCONTINUED | OUTPATIENT
Start: 2022-11-18 | End: 2022-11-18

## 2022-11-18 RX ORDER — POLYETHYLENE GLYCOL 3350 17 G/17G
17 POWDER, FOR SOLUTION ORAL DAILY
Status: DISCONTINUED | OUTPATIENT
Start: 2022-11-19 | End: 2022-11-21 | Stop reason: HOSPADM

## 2022-11-18 RX ORDER — NALOXONE HCL 0.4 MG/ML
0.02 VIAL (ML) INJECTION
Status: DISCONTINUED | OUTPATIENT
Start: 2022-11-18 | End: 2022-11-21 | Stop reason: HOSPADM

## 2022-11-18 RX ORDER — ONDANSETRON 2 MG/ML
4 INJECTION INTRAMUSCULAR; INTRAVENOUS EVERY 8 HOURS PRN
Status: CANCELLED | OUTPATIENT
Start: 2022-11-18

## 2022-11-18 RX ORDER — THIAMINE HCL 100 MG
100 TABLET ORAL DAILY
Status: DISCONTINUED | OUTPATIENT
Start: 2022-11-19 | End: 2022-11-21 | Stop reason: HOSPADM

## 2022-11-18 RX ORDER — PREDNISONE 20 MG/1
40 TABLET ORAL DAILY
Status: DISCONTINUED | OUTPATIENT
Start: 2022-11-18 | End: 2022-11-18

## 2022-11-18 RX ORDER — LANOLIN ALCOHOL/MO/W.PET/CERES
1 CREAM (GRAM) TOPICAL DAILY
Status: DISCONTINUED | OUTPATIENT
Start: 2022-11-19 | End: 2022-11-21 | Stop reason: HOSPADM

## 2022-11-18 RX ORDER — IBUPROFEN 200 MG
16 TABLET ORAL
Status: DISCONTINUED | OUTPATIENT
Start: 2022-11-18 | End: 2022-11-21 | Stop reason: HOSPADM

## 2022-11-18 RX ORDER — POTASSIUM CHLORIDE 750 MG/1
30 CAPSULE, EXTENDED RELEASE ORAL ONCE
Status: COMPLETED | OUTPATIENT
Start: 2022-11-18 | End: 2022-11-18

## 2022-11-18 RX ORDER — LEVALBUTEROL INHALATION SOLUTION 0.63 MG/3ML
0.63 SOLUTION RESPIRATORY (INHALATION) EVERY 8 HOURS
Status: DISCONTINUED | OUTPATIENT
Start: 2022-11-18 | End: 2022-11-20

## 2022-11-18 RX ORDER — ACETAMINOPHEN 325 MG/1
650 TABLET ORAL EVERY 4 HOURS PRN
Status: CANCELLED | OUTPATIENT
Start: 2022-11-18

## 2022-11-18 RX ORDER — ACETAMINOPHEN 325 MG/1
650 TABLET ORAL EVERY 8 HOURS PRN
Status: CANCELLED | OUTPATIENT
Start: 2022-11-18

## 2022-11-18 RX ORDER — ACETAMINOPHEN 325 MG/1
650 TABLET ORAL EVERY 8 HOURS PRN
Status: DISCONTINUED | OUTPATIENT
Start: 2022-11-18 | End: 2022-11-21 | Stop reason: HOSPADM

## 2022-11-18 RX ORDER — GLUCAGON 1 MG
1 KIT INJECTION
Status: DISCONTINUED | OUTPATIENT
Start: 2022-11-18 | End: 2022-11-21 | Stop reason: HOSPADM

## 2022-11-18 RX ADMIN — LEVALBUTEROL HYDROCHLORIDE 0.63 MG: 0.63 SOLUTION RESPIRATORY (INHALATION) at 03:11

## 2022-11-18 RX ADMIN — SODIUM CHLORIDE: 0.9 INJECTION, SOLUTION INTRAVENOUS at 12:11

## 2022-11-18 RX ADMIN — METHYLPREDNISOLONE SODIUM SUCCINATE 80 MG: 40 INJECTION, POWDER, FOR SOLUTION INTRAMUSCULAR; INTRAVENOUS at 07:11

## 2022-11-18 RX ADMIN — IPRATROPIUM BROMIDE 0.5 MG: 0.5 SOLUTION RESPIRATORY (INHALATION) at 11:11

## 2022-11-18 RX ADMIN — ENOXAPARIN SODIUM 40 MG: 40 INJECTION SUBCUTANEOUS at 10:11

## 2022-11-18 RX ADMIN — SENNOSIDES 8.6 MG: 8.6 TABLET, FILM COATED ORAL at 10:11

## 2022-11-18 RX ADMIN — LEVALBUTEROL HYDROCHLORIDE 0.63 MG: 0.63 SOLUTION RESPIRATORY (INHALATION) at 11:11

## 2022-11-18 RX ADMIN — METHYLPREDNISOLONE SODIUM SUCCINATE 80 MG: 40 INJECTION, POWDER, FOR SOLUTION INTRAMUSCULAR; INTRAVENOUS at 11:11

## 2022-11-18 RX ADMIN — LEVALBUTEROL HYDROCHLORIDE 0.63 MG: 0.63 SOLUTION RESPIRATORY (INHALATION) at 08:11

## 2022-11-18 RX ADMIN — MAGNESIUM SULFATE 2 G: 2 INJECTION INTRAVENOUS at 11:11

## 2022-11-18 RX ADMIN — POTASSIUM CHLORIDE 30 MEQ: 10 CAPSULE, COATED, EXTENDED RELEASE ORAL at 10:11

## 2022-11-18 RX ADMIN — IPRATROPIUM BROMIDE AND ALBUTEROL SULFATE 3 ML: 2.5; .5 SOLUTION RESPIRATORY (INHALATION) at 04:11

## 2022-11-18 RX ADMIN — ALBUTEROL SULFATE 10 MG: 2.5 SOLUTION RESPIRATORY (INHALATION) at 06:11

## 2022-11-18 RX ADMIN — PREDNISONE 40 MG: 20 TABLET ORAL at 08:11

## 2022-11-18 NOTE — ED NOTES
Patient resting in stretcher and is in NAD at this time. Pt is awake alert and oriented x4, VSS, respirations even and unlabored. Pt updated on plan of care. Bed low and locked with side rails up x2, call bell in pt reach. Pt voices no needs at this time.  Will continue to monitor.    LOC: The patient is awake, alert and aware of environment with an appropriate affect, the patient is oriented x 3 and speaking appropriately.  APPEARANCE: Patient resting comfortably and in no acute distress, patient is clean and well groomed, patient's clothing is properly fastened.  SKIN: The skin is warm and dry, color consistent with ethnicity, patient has normal skin turgor and moist mucus membranes, skin intact, no breakdown or bruising noted.  MUSCULOSKELETAL: Patient moving all extremities spontaneously, no obvious swelling or deformities noted.  RESPIRATORY: Airway is open and patent, respirations are spontaneous, patient has a normal effort and rate, no accessory muscle use noted,   CARDIAC: Patient has a normal rate and regular rhythm, no periphreal edema noted, capillary refill < 3 seconds.  ABDOMEN: Soft and non tender to palpation, no distention noted, normoactive bowel sounds present in all four quadrants.  NEUROLOGIC:  facial expression is symmetrical, patient moving all extremities spontaneously, normal sensation in all extremities when touched with a finger.  Follows all commands appropriately.

## 2022-11-18 NOTE — ED TRIAGE NOTES
"Baltazar Mccray, a 62 y.o. male presents to the ED w/ complaint of shortness of breath. Pt reports "it tawny comes and goes. I think it might be the weather." Denies fever, chills, n/v/d. States his nightly BiPAP helped a little.     Triage note:  Chief Complaint   Patient presents with    Shortness of Breath     Pt c/o increased SOB for the last few hours. PMH of COPD. Pt received 125 mg solumedrol and 1 duoneb in route.      Review of patient's allergies indicates:   Allergen Reactions    Benazepril Swelling     Past Medical History:   Diagnosis Date    Aspiration pneumonia 5/30/2021    Asthma     Atrial fibrillation with rapid ventricular response     CHF (congestive heart failure)     COPD (chronic obstructive pulmonary disease)     home O2 at night only    Coronary artery disease     Hypertension     Lung nodule     Pneumonia     Seizures       "

## 2022-11-18 NOTE — HPI
Baltazar Mccray is a 62yM with h/o COPD on  2LNC home O2, HTN, HFpEF, seizure disorder admitted to hospital medicine for management of acute COPD exacerbation. Patient reports gradually worsening shortness of breath that started 2 days ago. Endorses associated dry cough, decreased PO intake and nausea with SOB onset. Denies alleviating or aggravating factors. Used home MDI, nebulizer treatments and nightly BiPAP without improvement of symptoms. States symptoms are similar to prior COPD flares. Denies fever, chills, vomiting, abdominal pain, diarrhea, chest pain, extremity numbness or weakness.       In ED: T 98.3 F, , R 22, /96 and SpO2 97% on NRB mask. CBC without leukocytosis, CMP with mild hypokalemia and metabolic alkalosis. CXR with findings suggestive of COPD/emphysema.  There is a thin linear opacity in the right mid lung zone suggestive of atelectasis and/or scarring, unchanged.  No definite new confluent airspace consolidation, substantial volume of pleural fluid or pneumothorax. EKG with sinus tachycardia. Trop elevated to 0.034, similar to baseline. Flu and COVID swabs negative.  He received 1 DuoNeb and 125 mg of Solu-Medrol on route with EMS. Received duonebs x 3 + steroids with minimal improvement. Placed on continuous albuterol x 1 hour, persistent tachypnea  - admitted to hospital medicine for further management.

## 2022-11-18 NOTE — ED PROVIDER NOTES
Encounter Date: 11/18/2022       History     Chief Complaint   Patient presents with    Shortness of Breath     Pt c/o increased SOB for the last few hours. PMH of COPD. Pt received 125 mg solumedrol and 1 duoneb in route.      62yM with h/o COPD on home nocturnal 2LNC, HTN, HFpEF, seizure disorder here with acute onset SOB x a few hours.  Per EMS, patient has been complaining of intermittent shortness of breath all day.  He has used home nebulizer treatments, with the most recent being 2 hours prior to EMS arrival.  His nebulizer treatments have provided him with none to minimal relief.  Also endorsing a dry cough.  He received 1 DuoNeb and 125 mg of Solu-Medrol on route with EMS.  He denies any fevers/chills, chest pain, abdominal pain or nausea vomiting.      Review of patient's allergies indicates:   Allergen Reactions    Benazepril Swelling     Past Medical History:   Diagnosis Date    Aspiration pneumonia 5/30/2021    Asthma     Atrial fibrillation with rapid ventricular response     CHF (congestive heart failure)     COPD (chronic obstructive pulmonary disease)     home O2 at night only    Coronary artery disease     Hypertension     Lung nodule     Pneumonia     Seizures      Past Surgical History:   Procedure Laterality Date    ESOPHAGOGASTRODUODENOSCOPY N/A 2/11/2022    Procedure: EGD (ESOPHAGOGASTRODUODENOSCOPY);  Surgeon: Cain Ortega MD;  Location: [facility] ([address] [place] [address]);  Service: Endoscopy;  Laterality: N/A;    ESOPHAGOGASTRODUODENOSCOPY N/A 9/15/2022    Procedure: EGD (ESOPHAGOGASTRODUODENOSCOPY);  Surgeon: Leonid Chavez MD;  Location: [address]);  Service: Endoscopy;  Laterality: N/A;  PA-50 - 2nd floor  vacc-wears 2L o2-inst mail and verbal-clears 4 hrs prior-tb  8/30 pt rescheduled; updated instructions mailed-st    LEFT HEART CATHETERIZATION  4/23/2020    Procedure: Left heart cath;  Surgeon: Nic Pollack MD;  Location: [facility] CATH LAB;  Service: Cardiology;;     Family History    Problem Relation Age of Onset    Cancer Father     Hypertension Sister     Stroke Sister     Stroke Brother     Diabetes Neg Hx     Heart disease Neg Hx      Social History     Tobacco Use    Smoking status: Some Days     Packs/day: 0.25     Types: Cigarettes    Smokeless tobacco: Never    Tobacco comments:     1-2 cigarettes per day   Substance Use Topics    Alcohol use: Yes     Alcohol/week: 2.0 standard drinks     Types: 2 Cans of beer per week     Comment: every night    Drug use: No     Review of Systems   Constitutional:  Negative for activity change, chills and fever.   HENT:  Negative for congestion, ear pain and sore throat.    Respiratory:  Positive for cough and shortness of breath. Negative for stridor.    Cardiovascular:  Negative for chest pain and palpitations.   Gastrointestinal:  Negative for abdominal pain, nausea and vomiting.   Genitourinary:  Negative for dysuria and urgency.   Musculoskeletal:  Negative for back pain.   Skin:  Negative for rash.   Neurological:  Negative for dizziness, syncope, weakness and headaches.   Hematological:  Does not bruise/bleed easily.     Physical Exam     Initial Vitals [11/18/22 0426]   BP Pulse Resp Temp SpO2   (!) 158/96 (!) 111 (!) 22 98.3 °F (36.8 °C) 97 %      MAP       --         Physical Exam    Nursing note and vitals reviewed.  Constitutional: He appears well-developed and well-nourished. He is not diaphoretic. No distress. Nasal cannula in place.   Uncomfortable appearing.  Tachypneic.  Can speak in short sentences, but becomes conversationally dyspneic.  Mild respiratory distress with subcostal retractions noted.   HENT:   Head: Normocephalic and atraumatic.   Right Ear: External ear normal.   Left Ear: External ear normal.   Neck: Neck supple.   Cardiovascular:  Regular rhythm, normal heart sounds and intact distal pulses.   Tachycardia present.         Pulmonary/Chest: Accessory muscle usage present. Tachypnea noted. He is in respiratory distress  (mild). He has wheezes. He has no rhonchi. He has no rales.   Diffuse expiratory wheezes   Abdominal: Abdomen is soft. He exhibits no distension. There is no abdominal tenderness. There is no rebound and no guarding.   Musculoskeletal:      Cervical back: Neck supple.     Neurological: He is alert and oriented to person, place, and time. GCS score is 15. GCS eye subscore is 4. GCS verbal subscore is 5. GCS motor subscore is 6.   Skin: Skin is warm. Capillary refill takes less than 2 seconds. No rash noted.   Psychiatric: He has a normal mood and affect.       ED Course   Procedures  Labs Reviewed   CBC W/ AUTO DIFFERENTIAL - Abnormal; Notable for the following components:       Result Value    Hemoglobin 12.4 (*)     MCV 80 (*)     MCH 24.8 (*)     MCHC 30.9 (*)     RDW 17.4 (*)     All other components within normal limits   COMPREHENSIVE METABOLIC PANEL - Abnormal; Notable for the following components:    Potassium 3.3 (*)     CO2 30 (*)     Glucose 113 (*)     BUN 4 (*)     Albumin 3.3 (*)     All other components within normal limits   TROPONIN I - Abnormal; Notable for the following components:    Troponin I 0.034 (*)     All other components within normal limits    Narrative:     ADD ON TROPONIN PER DR GEOVANNI MEYERS/ORDER# [id number] @ 5:28AM   ISTAT PROCEDURE - Abnormal; Notable for the following components:    POC PH 7.311 (*)     POC PCO2 70.3 (*)     POC HCO3 35.5 (*)     POC SATURATED O2 67 (*)     POC TCO2 38 (*)     All other components within normal limits   B-TYPE NATRIURETIC PEPTIDE    Narrative:     ADD ON TROPONIN PER DR GEOVANNI MEYERS/ORDER# [id number] @ 5:28AM   TROPONIN I   SARS-COV2 (COVID) WITH FLU/RSV BY PCR   PROCALCITONIN   TROPONIN I     EKG Readings: (Independently Interpreted)   Initial Reading: No STEMI. Previous EKG: Compared with most recent EKG Previous EKG Date: 10/19/22. Rhythm: Sinus Tachycardia. Heart Rate: 128. Ectopy: No Ectopy. Conduction: Normal.     Imaging Results               X-Ray Chest AP Portable (Final result)  Result time 11/18/22 05:53:08      Final result by Mary Bermudez MD (11/18/22 05:53:08)                   Impression:      Please see above.      Electronically signed by: Mary Bermudez MD  Date:    11/18/2022  Time:    05:53               Narrative:    EXAMINATION:  XR CHEST AP PORTABLE    CLINICAL HISTORY:  Shortness of breath    TECHNIQUE:  Single frontal view of the chest was performed.    COMPARISON:  Cardiac monitoring leads overlie the chest.    FINDINGS:  Cardiac monitoring leads overlie the chest.  The cardiomediastinal silhouette is unchanged in size and configuration noting atherosclerosis of the thoracic aorta.  Mediastinal structures are midline.  The lungs are hyperexpanded with diffuse coarse interstitial attenuation similar to prior exams.  Findings suggestive of COPD/emphysema.  There is a thin linear opacity in the right mid lung zone suggestive of atelectasis and/or scarring, unchanged.  No definite new confluent airspace consolidation, substantial volume of pleural fluid or pneumothorax.  Previously identified pulmonary nodule seen on CTA chest 08/11/2022 are not as well delineated on this single radiographic view of the chest.  Osseous structures are intact.                                    X-Rays:   Independently Interpreted Readings:   Chest X-Ray: Diaphragmatic flattening, no consolidations.   Medications   albuterol sulfate nebulizer solution 10 mg (10 mg Nebulization New Bag 11/18/22 0625)   sodium chloride 0.9% flush 10 mL (has no administration in time range)   melatonin tablet 6 mg (has no administration in time range)   sodium chloride 0.9% flush 3 mL (has no administration in time range)   predniSONE tablet 40 mg (has no administration in time range)   ondansetron disintegrating tablet 8 mg (has no administration in time range)   prochlorperazine injection Soln 5 mg (has no administration in time range)   polyethylene glycol packet 17 g  (has no administration in time range)   acetaminophen tablet 650 mg (has no administration in time range)   levalbuterol nebulizer solution 0.63 mg (has no administration in time range)   albuterol-ipratropium 2.5 mg-0.5 mg/3 mL nebulizer solution 3 mL ( Nebulization Canceled Entry 11/18/22 0500)     Medical Decision Making:   Initial Assessment:   Emergent evaluation of shortness of breath. He is afebrile and hemodynamically stable.  Differential Diagnosis:   COPD exacerbation, viral vs bacterial pneumonia, ACS, electrolyte abnormality  Clinical Tests:   Lab Tests: Reviewed and Ordered  Radiological Study: Ordered and Reviewed  Medical Tests: Ordered and Reviewed  ED Management:  History and physical exam consistent with a COPD exacerbation. Mild elevation in troponin without significance-- EKG with nonspecific ST changes. He is without chest pain and wheezing auscultated on exam, lowering suspicion for ACS. VBG mildly acidotic with evidence of chronic retention. After duonebs x 3 + steroids, patient with improved air movement, but reporting continued tachypnea despite being on his 2 L NC. Placed on continuous albuterol x 1 hour. Discussed with hospital medicine, who will admit patient to their service.          Attending Attestation:   Physician Attestation Statement for Resident:  As the supervising MD   Physician Attestation Statement: I have personally seen and examined this patient.   I agree with the above history.  -:   As the supervising MD I agree with the above PE.     As the supervising MD I agree with the above treatment, course, plan, and disposition.            Attending Critical Care:   Critical Care Times:   ==============================================================  Total Critical Care Time - exclusive of procedural time: 30 minutes.  ==============================================================  Critical care was necessary to treat or prevent imminent or life-threatening deterioration of the  following conditions: COPD exacerbation.   Critical care was time spent personally by me on the following activities: obtaining history from patient or relative, examination of patient, review of old charts, ordering lab, x-rays, and/or EKG, development of treatment plan with patient or relative, ordering and performing treatments and interventions, evaluation of patient's response to treatment, interpretation of cardiac measurements and re-evaluation of patient's conition.   Critical Care Condition: potentially life-threatening                      Clinical Impression:   Final diagnoses:  [R06.02] Shortness of breath  [J44.1] COPD exacerbation (Primary)      ED Disposition Condition    Observation Stable                Jerry Mcintyre MD  Resident  11/18/22 0744       Viktoriya Hui MD  11/21/22 [id number]

## 2022-11-18 NOTE — Clinical Note
Diagnosis: Hypoxia [805640]   Future Attending Provider: RONDA MARTIN [[id number]]   Admitting Provider:: GALILEA NOGUEIRA [[id number]]

## 2022-11-19 PROBLEM — E83.39 HYPOPHOSPHATEMIA: Status: ACTIVE | Noted: 2022-11-19

## 2022-11-19 LAB
ANION GAP SERPL CALC-SCNC: 11 MMOL/L (ref 8–16)
BASOPHILS # BLD AUTO: 0.01 K/UL (ref 0–0.2)
BASOPHILS NFR BLD: 0.1 % (ref 0–1.9)
BUN SERPL-MCNC: 8 MG/DL (ref 6–20)
CALCIUM SERPL-MCNC: 9.8 MG/DL (ref 8.7–10.5)
CHLORIDE SERPL-SCNC: 97 MMOL/L (ref 95–110)
CO2 SERPL-SCNC: 27 MMOL/L (ref 23–29)
CREAT SERPL-MCNC: 0.7 MG/DL (ref 0.5–1.4)
DIFFERENTIAL METHOD: ABNORMAL
EOSINOPHIL # BLD AUTO: 0 K/UL (ref 0–0.5)
EOSINOPHIL NFR BLD: 0 % (ref 0–8)
ERYTHROCYTE [DISTWIDTH] IN BLOOD BY AUTOMATED COUNT: 18.4 % (ref 11.5–14.5)
EST. GFR  (NO RACE VARIABLE): >60 ML/MIN/1.73 M^2
GLUCOSE SERPL-MCNC: 131 MG/DL (ref 70–110)
HCT VFR BLD AUTO: 39.5 % (ref 40–54)
HGB BLD-MCNC: 12.4 G/DL (ref 14–18)
IMM GRANULOCYTES # BLD AUTO: 0.05 K/UL (ref 0–0.04)
IMM GRANULOCYTES NFR BLD AUTO: 0.3 % (ref 0–0.5)
LYMPHOCYTES # BLD AUTO: 0.7 K/UL (ref 1–4.8)
LYMPHOCYTES NFR BLD: 3.9 % (ref 18–48)
MAGNESIUM SERPL-MCNC: 1.9 MG/DL (ref 1.6–2.6)
MCH RBC QN AUTO: 25.4 PG (ref 27–31)
MCHC RBC AUTO-ENTMCNC: 31.4 G/DL (ref 32–36)
MCV RBC AUTO: 81 FL (ref 82–98)
MONOCYTES # BLD AUTO: 0.3 K/UL (ref 0.3–1)
MONOCYTES NFR BLD: 1.4 % (ref 4–15)
NEUTROPHILS # BLD AUTO: 16.7 K/UL (ref 1.8–7.7)
NEUTROPHILS NFR BLD: 94.3 % (ref 38–73)
NRBC BLD-RTO: 0 /100 WBC
PHOSPHATE SERPL-MCNC: 1.8 MG/DL (ref 2.7–4.5)
PLATELET # BLD AUTO: 377 K/UL (ref 150–450)
PMV BLD AUTO: 10.5 FL (ref 9.2–12.9)
POCT GLUCOSE: 124 MG/DL (ref 70–110)
POCT GLUCOSE: 152 MG/DL (ref 70–110)
POTASSIUM SERPL-SCNC: 4 MMOL/L (ref 3.5–5.1)
RBC # BLD AUTO: 4.89 M/UL (ref 4.6–6.2)
SODIUM SERPL-SCNC: 135 MMOL/L (ref 136–145)
TROPONIN I SERPL DL<=0.01 NG/ML-MCNC: 0.03 NG/ML (ref 0–0.03)
WBC # BLD AUTO: 17.73 K/UL (ref 3.9–12.7)

## 2022-11-19 PROCEDURE — 25000242 PHARM REV CODE 250 ALT 637 W/ HCPCS

## 2022-11-19 PROCEDURE — 80048 BASIC METABOLIC PNL TOTAL CA: CPT

## 2022-11-19 PROCEDURE — 99233 SBSQ HOSP IP/OBS HIGH 50: CPT | Mod: ,,,

## 2022-11-19 PROCEDURE — 27000221 HC OXYGEN, UP TO 24 HOURS

## 2022-11-19 PROCEDURE — 36415 COLL VENOUS BLD VENIPUNCTURE: CPT

## 2022-11-19 PROCEDURE — 94761 N-INVAS EAR/PLS OXIMETRY MLT: CPT

## 2022-11-19 PROCEDURE — 84100 ASSAY OF PHOSPHORUS: CPT

## 2022-11-19 PROCEDURE — 25000003 PHARM REV CODE 250

## 2022-11-19 PROCEDURE — 84484 ASSAY OF TROPONIN QUANT: CPT

## 2022-11-19 PROCEDURE — 94660 CPAP INITIATION&MGMT: CPT

## 2022-11-19 PROCEDURE — 99900035 HC TECH TIME PER 15 MIN (STAT)

## 2022-11-19 PROCEDURE — 63600175 PHARM REV CODE 636 W HCPCS: Performed by: HOSPITALIST

## 2022-11-19 PROCEDURE — 94640 AIRWAY INHALATION TREATMENT: CPT

## 2022-11-19 PROCEDURE — 83735 ASSAY OF MAGNESIUM: CPT

## 2022-11-19 PROCEDURE — 27000190 HC CPAP FULL FACE MASK W/VALVE

## 2022-11-19 PROCEDURE — 63600175 PHARM REV CODE 636 W HCPCS

## 2022-11-19 PROCEDURE — 20600001 HC STEP DOWN PRIVATE ROOM

## 2022-11-19 PROCEDURE — 85025 COMPLETE CBC W/AUTO DIFF WBC: CPT

## 2022-11-19 PROCEDURE — 25000003 PHARM REV CODE 250: Performed by: HOSPITALIST

## 2022-11-19 PROCEDURE — 99233 PR SUBSEQUENT HOSPITAL CARE,LEVL III: ICD-10-PCS | Mod: ,,,

## 2022-11-19 PROCEDURE — 25000003 PHARM REV CODE 250: Performed by: STUDENT IN AN ORGANIZED HEALTH CARE EDUCATION/TRAINING PROGRAM

## 2022-11-19 RX ORDER — PREDNISONE 20 MG/1
40 TABLET ORAL DAILY
Status: DISCONTINUED | OUTPATIENT
Start: 2022-11-19 | End: 2022-11-20

## 2022-11-19 RX ORDER — NIFEDIPINE 30 MG/1
30 TABLET, EXTENDED RELEASE ORAL DAILY
Status: DISCONTINUED | OUTPATIENT
Start: 2022-11-19 | End: 2022-11-21 | Stop reason: HOSPADM

## 2022-11-19 RX ORDER — SODIUM,POTASSIUM PHOSPHATES 280-250MG
1 POWDER IN PACKET (EA) ORAL EVERY 4 HOURS
Status: DISPENSED | OUTPATIENT
Start: 2022-11-19 | End: 2022-11-20

## 2022-11-19 RX ORDER — FUROSEMIDE 40 MG/1
40 TABLET ORAL 2 TIMES DAILY
Status: DISCONTINUED | OUTPATIENT
Start: 2022-11-19 | End: 2022-11-21

## 2022-11-19 RX ADMIN — MUPIROCIN: 20 OINTMENT TOPICAL at 08:11

## 2022-11-19 RX ADMIN — METHYLPREDNISOLONE SODIUM SUCCINATE 80 MG: 40 INJECTION, POWDER, FOR SOLUTION INTRAMUSCULAR; INTRAVENOUS at 06:11

## 2022-11-19 RX ADMIN — Medication 100 MG: at 08:11

## 2022-11-19 RX ADMIN — ACETAMINOPHEN 650 MG: 325 TABLET ORAL at 09:11

## 2022-11-19 RX ADMIN — IPRATROPIUM BROMIDE 0.5 MG: 0.5 SOLUTION RESPIRATORY (INHALATION) at 08:11

## 2022-11-19 RX ADMIN — POTASSIUM & SODIUM PHOSPHATES POWDER PACK 280-160-250 MG 1 PACKET: 280-160-250 PACK at 09:11

## 2022-11-19 RX ADMIN — THERA TABS 1 TABLET: TAB at 08:11

## 2022-11-19 RX ADMIN — ALUMINUM HYDROXIDE, MAGNESIUM HYDROXIDE, AND SIMETHICONE 30 ML: 200; 200; 20 SUSPENSION ORAL at 11:11

## 2022-11-19 RX ADMIN — ACETAMINOPHEN 650 MG: 325 TABLET ORAL at 12:11

## 2022-11-19 RX ADMIN — Medication 6 MG: at 09:11

## 2022-11-19 RX ADMIN — NIFEDIPINE 30 MG: 30 TABLET, FILM COATED, EXTENDED RELEASE ORAL at 06:11

## 2022-11-19 RX ADMIN — LEVALBUTEROL HYDROCHLORIDE 0.63 MG: 0.63 SOLUTION RESPIRATORY (INHALATION) at 05:11

## 2022-11-19 RX ADMIN — PREDNISONE 40 MG: 20 TABLET ORAL at 11:11

## 2022-11-19 RX ADMIN — FERROUS SULFATE TAB 325 MG (65 MG ELEMENTAL FE) 1 EACH: 325 (65 FE) TAB at 08:11

## 2022-11-19 RX ADMIN — FOLIC ACID 1 MG: 1 TABLET ORAL at 08:11

## 2022-11-19 RX ADMIN — FUROSEMIDE 40 MG: 40 TABLET ORAL at 08:11

## 2022-11-19 RX ADMIN — ALUMINUM HYDROXIDE, MAGNESIUM HYDROXIDE, AND SIMETHICONE 30 ML: 200; 200; 20 SUSPENSION ORAL at 04:11

## 2022-11-19 RX ADMIN — METHYLPREDNISOLONE SODIUM SUCCINATE 80 MG: 40 INJECTION, POWDER, FOR SOLUTION INTRAMUSCULAR; INTRAVENOUS at 12:11

## 2022-11-19 RX ADMIN — POTASSIUM & SODIUM PHOSPHATES POWDER PACK 280-160-250 MG 1 PACKET: 280-160-250 PACK at 06:11

## 2022-11-19 RX ADMIN — ENOXAPARIN SODIUM 40 MG: 40 INJECTION SUBCUTANEOUS at 04:11

## 2022-11-19 RX ADMIN — MUPIROCIN: 20 OINTMENT TOPICAL at 09:11

## 2022-11-19 RX ADMIN — LEVALBUTEROL HYDROCHLORIDE 0.63 MG: 0.63 SOLUTION RESPIRATORY (INHALATION) at 08:11

## 2022-11-19 RX ADMIN — FUROSEMIDE 40 MG: 40 TABLET ORAL at 06:11

## 2022-11-19 RX ADMIN — IPRATROPIUM BROMIDE 0.5 MG: 0.5 SOLUTION RESPIRATORY (INHALATION) at 05:11

## 2022-11-19 RX ADMIN — IPRATROPIUM BROMIDE 0.5 MG: 0.5 SOLUTION RESPIRATORY (INHALATION) at 12:11

## 2022-11-19 RX ADMIN — SENNOSIDES 8.6 MG: 8.6 TABLET, FILM COATED ORAL at 09:11

## 2022-11-19 NOTE — ASSESSMENT & PLAN NOTE
Recent Labs   Lab 11/18/22  0901   TROPONINI 0.031*   - chronic condition  - appears to be at baseline   - EKG sinus tach, low suspicion for ACS

## 2022-11-19 NOTE — H&P
[facility] - Emergency Dept  Hospital Medicine  History & Physical    Patient Name: Baltazar Mccray  MRN: [id number]  Patient Class: IP- Inpatient  Admission Date: 11/18/2022  Attending Physician: Hien Kay MD   Primary Care Provider: Rhonda Of Katia         Patient information was obtained from patient, past medical records and ER records.     Subjective:     Principal Problem:COPD exacerbation    Chief Complaint:   Chief Complaint   Patient presents with    Shortness of Breath     Pt c/o increased SOB for the last few hours. PMH of COPD. Pt received 125 mg solumedrol and 1 duoneb in route.         HPI: Baltazar Mccray is a 62yM with h/o COPD on  2LNC home O2, HTN, HFpEF, seizure disorder admitted to hospital medicine for management of acute COPD exacerbation. Patient reports gradually worsening shortness of breath that started 2 days ago. Endorses associated dry cough, decreased PO intake and nausea with SOB onset. Denies alleviating or aggravating factors. Used home MDI, nebulizer treatments and nightly BiPAP without improvement of symptoms. States symptoms are similar to prior COPD flares. Denies fever, chills, vomiting, abdominal pain, diarrhea, chest pain, extremity numbness or weakness.       In ED: T 98.3 F, , R 22, /96 and SpO2 97% on NRB mask. CBC without leukocytosis, CMP with mild hypokalemia and metabolic alkalosis. CXR with findings suggestive of COPD/emphysema.  There is a thin linear opacity in the right mid lung zone suggestive of atelectasis and/or scarring, unchanged.  No definite new confluent airspace consolidation, substantial volume of pleural fluid or pneumothorax. EKG with sinus tachycardia. Trop elevated to 0.034, similar to baseline. Flu and COVID swabs negative.  He received 1 DuoNeb and 125 mg of Solu-Medrol on route with EMS. Received duonebs x 3 + steroids with minimal improvement. Placed on continuous albuterol x 1 hour, persistent tachypnea  - admitted to  hospital medicine for further management.      Past Medical History:   Diagnosis Date    Aspiration pneumonia 5/30/2021    Asthma     Atrial fibrillation with rapid ventricular response     CHF (congestive heart failure)     COPD (chronic obstructive pulmonary disease)     home O2 at night only    Coronary artery disease     Hypertension     Lung nodule     Pneumonia     Seizures        Past Surgical History:   Procedure Laterality Date    ESOPHAGOGASTRODUODENOSCOPY N/A 2/11/2022    Procedure: EGD (ESOPHAGOGASTRODUODENOSCOPY);  Surgeon: Cain Ortega MD;  Location: [facility] ENDO (2ND FLR);  Service: Endoscopy;  Laterality: N/A;    ESOPHAGOGASTRODUODENOSCOPY N/A 9/15/2022    Procedure: EGD (ESOPHAGOGASTRODUODENOSCOPY);  Surgeon: Leonid Chavez MD;  Location: [facility] ENDO (2ND FLR);  Service: Endoscopy;  Laterality: N/A;  PA-50 - 2nd floor  vacc-wears 2L o2-inst mail and verbal-clears 4 hrs prior-tb  8/30 pt rescheduled; updated instructions mailed-st    LEFT HEART CATHETERIZATION  4/23/2020    Procedure: Left heart cath;  Surgeon: Nic Pollack MD;  Location: [facility] CATH LAB;  Service: Cardiology;;       Review of patient's allergies indicates:   Allergen Reactions    Benazepril Swelling       No current facility-administered medications on file prior to encounter.     Current Outpatient Medications on File Prior to Encounter   Medication Sig    furosemide (LASIX) 20 MG tablet Take 2 tablets (40 mg total) by mouth once daily.    NIFEdipine (PROCARDIA-XL) 30 MG (OSM) 24 hr tablet Take 1 tablet (30 mg total) by mouth once daily.    albuterol (PROVENTIL/VENTOLIN HFA) 90 mcg/actuation inhaler Inhale 1-2 puffs into the lungs every 6 (six) hours as needed for Wheezing. Rescue    albuterol-ipratropium (DUO-NEB) 2.5 mg-0.5 mg/3 mL nebulizer solution Use 1 vial (3 mLs) by nebulization every 4 (four) hours as needed for Wheezing or Shortness of Breath. Rescue    bismuth subsalicylate (PEPTO BISMOL) 262 mg/15  mL suspension Take 30 mLs by mouth daily as needed for Indigestion.    budesonide-glycopyr-formoterol (BREZTRI AEROSPHERE) 160-9-4.8 mcg/actuation HFAA Inhale 2 puffs into the lungs 2 (two) times daily. Rinse mouth after use. CONTROL INHALER    calcium carbonate (TUMS ORAL) Take 2 tablets by mouth daily as needed (Heartburn).    ferrous sulfate 325 (65 FE) MG EC tablet Take 1 tablet (325 mg total) by mouth once daily.    folic acid (FOLVITE) 1 MG tablet Take 1 tablet (1 mg total) by mouth once daily.    multivitamin (THERAGRAN) tablet Take 1 tablet by mouth once daily.    nicotine (NICODERM CQ) 7 mg/24 hr Place 1 patch onto the skin once daily.    thiamine 100 MG tablet Take 1 tablet (100 mg total) by mouth once daily.    [DISCONTINUED] tiotropium bromide (SPIRIVA RESPIMAT) 2.5 mcg/actuation inhaler Inhale 2 puffs into the lungs Daily. Controller     Family History       Problem Relation (Age of Onset)    Cancer Father    Hypertension Sister    Stroke Sister, Brother          Tobacco Use    Smoking status: Some Days     Packs/day: 0.25     Types: Cigarettes    Smokeless tobacco: Never    Tobacco comments:     1-2 cigarettes per day   Substance and Sexual Activity    Alcohol use: Yes     Alcohol/week: 2.0 standard drinks     Types: 2 Cans of beer per week     Comment: every night    Drug use: No    Sexual activity: Not Currently     Review of Systems   Constitutional:  Positive for appetite change. Negative for activity change, chills and fever.   HENT:  Negative for trouble swallowing.    Eyes:  Negative for photophobia and visual disturbance.   Respiratory:  Positive for cough and shortness of breath. Negative for chest tightness and wheezing.    Cardiovascular:  Negative for chest pain, palpitations and leg swelling.   Gastrointestinal:  Positive for nausea. Negative for abdominal pain, constipation, diarrhea and vomiting.   Genitourinary:  Negative for dysuria, frequency, hematuria and urgency.    Musculoskeletal:  Negative for arthralgias, back pain and gait problem.   Skin:  Negative for color change and rash.   Neurological:  Negative for dizziness, syncope, weakness, light-headedness, numbness and headaches.   Psychiatric/Behavioral:  Negative for agitation and confusion. The patient is not nervous/anxious.    Objective:     Vital Signs (Most Recent):  Temp: 98.2 °F (36.8 °C) (11/18/22 1900)  Pulse: 99 (11/18/22 1900)  Resp: 20 (11/18/22 1900)  BP: (!) 157/83 (11/18/22 1900)  SpO2: 97 % (11/18/22 1900)   Vital Signs (24h Range):  Temp:  [98.2 °F (36.8 °C)-98.4 °F (36.9 °C)] 98.2 °F (36.8 °C)  Pulse:  [] 99  Resp:  [20-53] 20  SpO2:  [91 %-100 %] 97 %  BP: (127-172)/(69-96) 157/83        There is no height or weight on file to calculate BMI.    Physical Exam  Vitals and nursing note reviewed.   Constitutional:       General: He is not in acute distress.     Appearance: He is well-developed. He is ill-appearing (chronically ill appering). He is not toxic-appearing.   HENT:      Head: Normocephalic and atraumatic.      Nose:      Comments: 2L NC in place      Mouth/Throat:      Pharynx: No oropharyngeal exudate.   Eyes:      Conjunctiva/sclera: Conjunctivae normal.      Pupils: Pupils are equal, round, and reactive to light.   Cardiovascular:      Rate and Rhythm: Regular rhythm. Tachycardia present.      Heart sounds: Normal heart sounds. No murmur heard.    No gallop.   Pulmonary:      Effort: Pulmonary effort is normal. No respiratory distress.      Breath sounds: Normal breath sounds. No wheezing.      Comments: Diminished air movement, no wheezing  Chest:      Chest wall: No tenderness.   Abdominal:      General: Abdomen is flat. Bowel sounds are normal. There is no distension.      Palpations: Abdomen is soft. There is no mass.      Tenderness: There is no abdominal tenderness.   Musculoskeletal:         General: No tenderness. Normal range of motion.      Cervical back: Normal range of motion  and neck supple.   Lymphadenopathy:      Cervical: No cervical adenopathy.   Skin:     General: Skin is warm and dry.      Capillary Refill: Capillary refill takes less than 2 seconds.      Findings: No rash.   Neurological:      Mental Status: He is alert and oriented to person, place, and time.      Cranial Nerves: No cranial nerve deficit.      Sensory: No sensory deficit.      Coordination: Coordination normal.   Psychiatric:         Behavior: Behavior normal.         Thought Content: Thought content normal.         Judgment: Judgment normal.         CRANIAL NERVES     CN III, IV, VI   Pupils are equal, round, and reactive to light.     Significant Labs: All pertinent labs within the past 24 hours have been reviewed.  CBC:   Recent Labs   Lab 11/18/22 0438   WBC 7.50   HGB 12.4*   HCT 40.1        CMP:   Recent Labs   Lab 11/18/22 0438      K 3.3*   CL 95   CO2 30*   *   BUN 4*   CREATININE 0.8   CALCIUM 9.0   PROT 7.2   ALBUMIN 3.3*   BILITOT 0.4   ALKPHOS 83   AST 34   ALT 25   ANIONGAP 11     Cardiac Markers:   Recent Labs   Lab 11/18/22 0438   BNP 54     Lipid Panel: No results for input(s): CHOL, HDL, LDLCALC, TRIG, CHOLHDL in the last 48 hours.  Magnesium: No results for input(s): MG in the last 48 hours.  Troponin:   Recent Labs   Lab 11/18/22 0438 11/18/22  0901   TROPONINI 0.034* 0.031*     TSH: No results for input(s): TSH in the last 4320 hours.    Significant Imaging: I have reviewed all pertinent imaging results/findings within the past 24 hours.  Imaging Results              X-Ray Chest AP Portable (Final result)  Result time 11/18/22 05:53:08      Final result by Mary Bermudez MD (11/18/22 05:53:08)                   Impression:      Please see above.      Electronically signed by: Mary Bermudez MD  Date:    11/18/2022  Time:    05:53               Narrative:    EXAMINATION:  XR CHEST AP PORTABLE    CLINICAL HISTORY:  Shortness of breath    TECHNIQUE:  Single frontal view  of the chest was performed.    COMPARISON:  Cardiac monitoring leads overlie the chest.    FINDINGS:  Cardiac monitoring leads overlie the chest.  The cardiomediastinal silhouette is unchanged in size and configuration noting atherosclerosis of the thoracic aorta.  Mediastinal structures are midline.  The lungs are hyperexpanded with diffuse coarse interstitial attenuation similar to prior exams.  Findings suggestive of COPD/emphysema.  There is a thin linear opacity in the right mid lung zone suggestive of atelectasis and/or scarring, unchanged.  No definite new confluent airspace consolidation, substantial volume of pleural fluid or pneumothorax.  Previously identified pulmonary nodule seen on CTA chest 08/11/2022 are not as well delineated on this single radiographic view of the chest.  Osseous structures are intact.                                    Assessment/Plan:     * COPD exacerbation  BiPAP (biphasic positive airway pressure) dependence  Patient with worsening shortness of breath and work of breathing for the past 2 days. Not improved with home MDI, duo nebs or BiPAP. Hypoxic to 93% on 2 L NC (home O2 is 2L) with continued increase work of breathing and shortness of breath.     - reports increased sputum production, increased dyspnea. Tachycardic to 120s and tachypenic to RR 26  - does require 2L supplemental oxygen at baseline  - pulse oximetry 91% on room air/ 94% on 2L nasal cannula on admission [goal 88%-92%]   - 6 minute walk test ordered   - CXR demonstrated suggestive of COPD/emphysema. No definite new confluent airspace consolidation, substantial volume of pleural fluid or pneumothorax.   - procalcitonin <0.02  - Afebrile without leukocytosis  - Arterial Blood Gas result:  pO2 40; pCO2 52; pH 7.311;  HCO3 35.5, %O2 Sat 67 on admission   - repeat ABG after BiPAP improved to pO2 106; pCO2 70.3; pH 7.400;  HCO3 32.4, %O2 Sat 67  - started IV methylprednisolone 80 mg  daily x 5 days   - scheduled  ipratropium and levalbuterol nebs ordered, mucinex or airway clearance techniques if needed   - consider pulmonary rehabilitation at discharge     Acute on chronic respiratory failure with hypoxia and hypercapnia  Patient with Hypercapnic and Hypoxic Respiratory failure which is Acute on chronic.  he is on home oxygen at 2 LPM. Supplemental oxygen was provided and noted- Oxygen Concentration (%):  [35] 35.   Signs/symptoms of respiratory failure include- tachypnea and increased work of breathing. Contributing diagnoses includes - COPD Labs and images were reviewed. Patient Has recent ABG, which has been reviewed. Will treat underlying causes and adjust management of respiratory failure as follows-   - received duoneb x4 and IV solumedrol in the ED   - given IV mag   - placed on BiPAP   - repeat ABG with normalized CO2 and pH  - will monitor on telemetry  - pulse oximetry q4h     (HFpEF) heart failure with preserved ejection fraction  - CHF, appears euvolemic, controlled  - continue home lasix 40 mg daily   BNP  Recent Labs   Lab 11/18/22  0438   BNP 54     Results for orders placed during the hospital encounter of 08/10/22    Echo    Interpretation Summary  · The left ventricle is normal in size with concentric remodeling and normal systolic function.  · The estimated ejection fraction is 55%.  · Indeterminate left ventricular diastolic function.  · There is abnormal septal wall motion.  · Normal right ventricular size with normal right ventricular systolic function.  · Intermediate central venous pressure (8 mmHg).  · Severe left atrial enlargement.  · An interatrial septal aneurysm is present.    Hypokalemia  - appears to be chronic, frequent lasix use  - monitor on BMP  - replace as needed  - cardiac telemetry    Atrial fibrillation  Patient with Long standing persistent (>12 months) atrial fibrillation which is controlled currently with Calcium Channel Blocker. Patient is currently in sinus rhythm.[id number]  Score: 1. Anticoagulation not indicated due to CHADsVAsC of 1.    Alcohol use disorder, mild, abuse  - chronic condition  - no signs or symptoms of withdrawal on admission  - CIWA q4h  - will initiate benzodiazepine therapy for any signs of withdrawal     Cigarette nicotine dependence without complication  - nicotine patch prn     Elevated troponin  Recent Labs   Lab 11/18/22  0901   TROPONINI 0.031*   - chronic condition  - appears to be at baseline   - EKG sinus tach, low suspicion for ACS    Essential hypertension  Chronic condition  Continue home medications NIFEdipine (PROCARDIA-XL) 30 MG (OSM) 24 hr tablet  Vitals q4h      VTE Risk Mitigation (From admission, onward)         Ordered     enoxaparin injection 40 mg  Daily         11/18/22 1951     IP VTE HIGH RISK PATIENT  Once         11/18/22 1951     Place sequential compression device  Until discontinued         11/18/22 0649     Place sequential compression device  Until discontinued         11/18/22 0649                   Libby Holt PA-C  Department of Hospital Medicine   [facility] - Emergency Dept

## 2022-11-19 NOTE — ASSESSMENT & PLAN NOTE
- appears to be chronic, frequent lasix use  - monitor on BMP  - replace as needed  - cardiac telemetry

## 2022-11-19 NOTE — ASSESSMENT & PLAN NOTE
- chronic condition  - no signs or symptoms of withdrawal on admission  - CIWA q4h  - will initiate benzodiazepine therapy for any signs of withdrawal

## 2022-11-19 NOTE — ASSESSMENT & PLAN NOTE
BiPAP (biphasic positive airway pressure) dependence  Patient with worsening shortness of breath and work of breathing for the past 2 days. Not improved with home MDI, duo nebs or BiPAP. Hypoxic to 93% on 2 L NC (home O2 is 2L) with continued increase work of breathing and shortness of breath.     - reports increased sputum production, increased dyspnea. Tachycardic to 120s and tachypenic to RR 26  - does require 2L supplemental oxygen at baseline  - pulse oximetry 91% on room air/ 94% on 2L nasal cannula on admission [goal 88%-92%]   - 6 minute walk test ordered   - CXR demonstrated suggestive of COPD/emphysema. No definite new confluent airspace consolidation, substantial volume of pleural fluid or pneumothorax.   - procalcitonin <0.02  - Afebrile without leukocytosis  - Arterial Blood Gas result:  pO2 40; pCO2 52; pH 7.311;  HCO3 35.5, %O2 Sat 67 on admission   - repeat ABG after BiPAP improved to pO2 106; pCO2 70.3; pH 7.400;  HCO3 32.4, %O2 Sat 67  - started IV methylprednisolone 80 mg  daily x 5 days   - scheduled ipratropium and levalbuterol nebs ordered, mucinex or airway clearance techniques if needed   - consider pulmonary rehabilitation at discharge

## 2022-11-19 NOTE — ASSESSMENT & PLAN NOTE
Chronic condition  Continue home medications NIFEdipine (PROCARDIA-XL) 30 MG (OSM) 24 hr tablet  Vitals q4h

## 2022-11-19 NOTE — ASSESSMENT & PLAN NOTE
- CHF, appears euvolemic, controlled  - continue home lasix 40 mg daily   BNP  Recent Labs   Lab 11/18/22  0438   BNP 54     Results for orders placed during the hospital encounter of 08/10/22    Echo    Interpretation Summary  · The left ventricle is normal in size with concentric remodeling and normal systolic function.  · The estimated ejection fraction is 55%.  · Indeterminate left ventricular diastolic function.  · There is abnormal septal wall motion.  · Normal right ventricular size with normal right ventricular systolic function.  · Intermediate central venous pressure (8 mmHg).  · Severe left atrial enlargement.  · An interatrial septal aneurysm is present.

## 2022-11-19 NOTE — ASSESSMENT & PLAN NOTE
Patient with Hypercapnic and Hypoxic Respiratory failure which is Acute on chronic.  he is on home oxygen at 2 LPM. Supplemental oxygen was provided and noted- Oxygen Concentration (%):  [35] 35.   Signs/symptoms of respiratory failure include- tachypnea and increased work of breathing. Contributing diagnoses includes - COPD Labs and images were reviewed. Patient Has recent ABG, which has been reviewed. Will treat underlying causes and adjust management of respiratory failure as follows-   - received duoneb x4 and IV solumedrol in the ED   - given IV mag   - placed on BiPAP   - repeat ABG with normalized CO2 and pH  - will monitor on telemetry  - pulse oximetry q4h

## 2022-11-19 NOTE — ASSESSMENT & PLAN NOTE
Patient with Long standing persistent (>12 months) atrial fibrillation which is controlled currently with Calcium Channel Blocker. Patient is currently in sinus rhythm.[id number] Score: 1. Anticoagulation not indicated due to CHADsVAsC of 1.

## 2022-11-19 NOTE — SUBJECTIVE & OBJECTIVE
Interval History:  NAEON. AFVSS.   Evaluated at bedside, NAD. Notable desaturations to low 90s while on 2L NC with laying flat and exertion. Patient endorsing continued shortness of breath and work of breathing. Denies chest pain, abdominal pain or lightheadedness. No new complaints, all questions answered at that time.     Review of Systems   Constitutional:  Positive for appetite change. Negative for activity change, chills and fever.   HENT:  Negative for trouble swallowing.    Eyes:  Negative for photophobia and visual disturbance.   Respiratory:  Positive for cough and shortness of breath. Negative for chest tightness and wheezing.    Cardiovascular:  Negative for chest pain, palpitations and leg swelling.   Gastrointestinal:  Negative for abdominal pain, constipation, diarrhea, nausea and vomiting.   Genitourinary:  Negative for dysuria, frequency, hematuria and urgency.   Musculoskeletal:  Negative for arthralgias, back pain and gait problem.   Skin:  Negative for color change and rash.   Neurological:  Negative for dizziness, syncope, weakness, light-headedness, numbness and headaches.   Psychiatric/Behavioral:  Negative for agitation and confusion. The patient is not nervous/anxious.    Objective:     Vital Signs (Most Recent):  Temp: 98.4 °F (36.9 °C) (11/19/22 1520)  Pulse: (!) 119 (11/19/22 1710)  Resp: 16 (11/19/22 1710)  BP: (!) 155/76 (11/19/22 1520)  SpO2: (!) 92 % (11/19/22 1710)   Vital Signs (24h Range):  Temp:  [97.7 °F (36.5 °C)-98.4 °F (36.9 °C)] 98.4 °F (36.9 °C)  Pulse:  [] 119  Resp:  [14-20] 16  SpO2:  [92 %-100 %] 92 %  BP: (130-179)/(76-92) 155/76        There is no height or weight on file to calculate BMI.    Intake/Output Summary (Last 24 hours) at 11/19/2022 1747  Last data filed at 11/19/2022 1420  Gross per 24 hour   Intake --   Output 500 ml   Net -500 ml      Physical Exam  Vitals and nursing note reviewed.   Constitutional:       General: He is not in acute distress.      Appearance: He is well-developed. He is ill-appearing (chronically ill appearing).   HENT:      Head: Normocephalic and atraumatic.      Mouth/Throat:      Pharynx: No oropharyngeal exudate.   Eyes:      Conjunctiva/sclera: Conjunctivae normal.      Pupils: Pupils are equal, round, and reactive to light.   Cardiovascular:      Rate and Rhythm: Normal rate and regular rhythm.      Heart sounds: Normal heart sounds. No murmur heard.    No gallop.   Pulmonary:      Effort: Pulmonary effort is normal. No respiratory distress.      Breath sounds: No stridor. Rales (bibasilar inspiratory) present. No wheezing or rhonchi.   Abdominal:      General: Bowel sounds are normal. There is no distension.      Palpations: Abdomen is soft.      Tenderness: There is no abdominal tenderness.   Musculoskeletal:         General: No tenderness. Normal range of motion.      Cervical back: Normal range of motion and neck supple.   Lymphadenopathy:      Cervical: No cervical adenopathy.   Skin:     General: Skin is warm and dry.      Capillary Refill: Capillary refill takes less than 2 seconds.      Findings: No rash.   Neurological:      Mental Status: He is alert and oriented to person, place, and time.      Cranial Nerves: No cranial nerve deficit.      Sensory: No sensory deficit.      Coordination: Coordination normal.   Psychiatric:         Behavior: Behavior normal.         Thought Content: Thought content normal.         Judgment: Judgment normal.       Significant Labs: All pertinent labs within the past 24 hours have been reviewed.  CBC:   Recent Labs   Lab 11/18/22 0438 11/19/22  0712   WBC 7.50 17.73*   HGB 12.4* 12.4*   HCT 40.1 39.5*    377     CMP:   Recent Labs   Lab 11/18/22 0438 11/19/22  0712    135*   K 3.3* 4.0   CL 95 97   CO2 30* 27   * 131*   BUN 4* 8   CREATININE 0.8 0.7   CALCIUM 9.0 9.8   PROT 7.2  --    ALBUMIN 3.3*  --    BILITOT 0.4  --    ALKPHOS 83  --    AST 34  --    ALT 25  --     ANIONGAP 11 11       Significant Imaging: I have reviewed all pertinent imaging results/findings within the past 24 hours.

## 2022-11-19 NOTE — PLAN OF CARE
Problem: Adult Inpatient Plan of Care  Goal: Plan of Care Review  Outcome: Ongoing, Progressing  Goal: Patient-Specific Goal (Individualized)  Outcome: Ongoing, Progressing  Goal: Absence of Hospital-Acquired Illness or Injury  Outcome: Ongoing, Progressing  Goal: Optimal Comfort and Wellbeing  Outcome: Ongoing, Progressing  Goal: Readiness for Transition of Care  Outcome: Ongoing, Progressing     Problem: Adjustment to Illness COPD (Chronic Obstructive Pulmonary Disease)  Goal: Optimal Chronic Illness Coping  Outcome: Ongoing, Progressing     Problem: Functional Ability Impaired COPD (Chronic Obstructive Pulmonary Disease)  Goal: Optimal Level of Functional [place]  Outcome: Ongoing, Progressing     Problem: Infection COPD (Chronic Obstructive Pulmonary Disease)  Goal: Absence of Infection Signs and Symptoms  Outcome: Ongoing, Progressing     Problem: Oral Intake Inadequate COPD (Chronic Obstructive Pulmonary Disease)  Goal: Improved Nutrition Intake  Outcome: Ongoing, Progressing     Problem: Respiratory Compromise COPD (Chronic Obstructive Pulmonary Disease)  Goal: Effective Oxygenation and Ventilation  Outcome: Ongoing, Progressing     Problem: Infection  Goal: Absence of Infection Signs and Symptoms  Outcome: Ongoing, Progressing

## 2022-11-19 NOTE — SUBJECTIVE & OBJECTIVE
Past Medical History:   Diagnosis Date    Aspiration pneumonia 5/30/2021    Asthma     Atrial fibrillation with rapid ventricular response     CHF (congestive heart failure)     COPD (chronic obstructive pulmonary disease)     home O2 at night only    Coronary artery disease     Hypertension     Lung nodule     Pneumonia     Seizures        Past Surgical History:   Procedure Laterality Date    ESOPHAGOGASTRODUODENOSCOPY N/A 2/11/2022    Procedure: EGD (ESOPHAGOGASTRODUODENOSCOPY);  Surgeon: Cain Ortega MD;  Location: [facility] ENDO (2ND FLR);  Service: Endoscopy;  Laterality: N/A;    ESOPHAGOGASTRODUODENOSCOPY N/A 9/15/2022    Procedure: EGD (ESOPHAGOGASTRODUODENOSCOPY);  Surgeon: Leonid Chavez MD;  Location: [facility] ENDO (2ND FLR);  Service: Endoscopy;  Laterality: N/A;  PA-50 - 2nd floor  vacc-wears 2L o2-inst mail and verbal-clears 4 hrs prior-tb  8/30 pt rescheduled; updated instructions mailed-    LEFT HEART CATHETERIZATION  4/23/2020    Procedure: Left heart cath;  Surgeon: Nic Pollack MD;  Location: [facility] CATH LAB;  Service: Cardiology;;       Review of patient's allergies indicates:   Allergen Reactions    Benazepril Swelling       No current facility-administered medications on file prior to encounter.     Current Outpatient Medications on File Prior to Encounter   Medication Sig    furosemide (LASIX) 20 MG tablet Take 2 tablets (40 mg total) by mouth once daily.    NIFEdipine (PROCARDIA-XL) 30 MG (OSM) 24 hr tablet Take 1 tablet (30 mg total) by mouth once daily.    albuterol (PROVENTIL/VENTOLIN HFA) 90 mcg/actuation inhaler Inhale 1-2 puffs into the lungs every 6 (six) hours as needed for Wheezing. Rescue    albuterol-ipratropium (DUO-NEB) 2.5 mg-0.5 mg/3 mL nebulizer solution Use 1 vial (3 mLs) by nebulization every 4 (four) hours as needed for Wheezing or Shortness of Breath. Rescue    bismuth subsalicylate (PEPTO BISMOL) 262 mg/15 mL suspension Take 30 mLs by mouth daily as needed for  Indigestion.    budesonide-glycopyr-formoterol (BREZTRI AEROSPHERE) 160-9-4.8 mcg/actuation HFAA Inhale 2 puffs into the lungs 2 (two) times daily. Rinse mouth after use. CONTROL INHALER    calcium carbonate (TUMS ORAL) Take 2 tablets by mouth daily as needed (Heartburn).    ferrous sulfate 325 (65 FE) MG EC tablet Take 1 tablet (325 mg total) by mouth once daily.    folic acid (FOLVITE) 1 MG tablet Take 1 tablet (1 mg total) by mouth once daily.    multivitamin (THERAGRAN) tablet Take 1 tablet by mouth once daily.    nicotine (NICODERM CQ) 7 mg/24 hr Place 1 patch onto the skin once daily.    thiamine 100 MG tablet Take 1 tablet (100 mg total) by mouth once daily.    [DISCONTINUED] tiotropium bromide (SPIRIVA RESPIMAT) 2.5 mcg/actuation inhaler Inhale 2 puffs into the lungs Daily. Controller     Family History       Problem Relation (Age of Onset)    Cancer Father    Hypertension Sister    Stroke Sister, Brother          Tobacco Use    Smoking status: Some Days     Packs/day: 0.25     Types: Cigarettes    Smokeless tobacco: Never    Tobacco comments:     1-2 cigarettes per day   Substance and Sexual Activity    Alcohol use: Yes     Alcohol/week: 2.0 standard drinks     Types: 2 Cans of beer per week     Comment: every night    Drug use: No    Sexual activity: Not Currently     Review of Systems   Constitutional:  Positive for appetite change. Negative for activity change, chills and fever.   HENT:  Negative for trouble swallowing.    Eyes:  Negative for photophobia and visual disturbance.   Respiratory:  Positive for cough and shortness of breath. Negative for chest tightness and wheezing.    Cardiovascular:  Negative for chest pain, palpitations and leg swelling.   Gastrointestinal:  Positive for nausea. Negative for abdominal pain, constipation, diarrhea and vomiting.   Genitourinary:  Negative for dysuria, frequency, hematuria and urgency.   Musculoskeletal:  Negative for arthralgias, back pain and gait problem.    Skin:  Negative for color change and rash.   Neurological:  Negative for dizziness, syncope, weakness, light-headedness, numbness and headaches.   Psychiatric/Behavioral:  Negative for agitation and confusion. The patient is not nervous/anxious.    Objective:     Vital Signs (Most Recent):  Temp: 98.2 °F (36.8 °C) (11/18/22 1900)  Pulse: 99 (11/18/22 1900)  Resp: 20 (11/18/22 1900)  BP: (!) 157/83 (11/18/22 1900)  SpO2: 97 % (11/18/22 1900)   Vital Signs (24h Range):  Temp:  [98.2 °F (36.8 °C)-98.4 °F (36.9 °C)] 98.2 °F (36.8 °C)  Pulse:  [] 99  Resp:  [20-53] 20  SpO2:  [91 %-100 %] 97 %  BP: (127-172)/(69-96) 157/83        There is no height or weight on file to calculate BMI.    Physical Exam  Vitals and nursing note reviewed.   Constitutional:       General: He is not in acute distress.     Appearance: He is well-developed. He is ill-appearing (chronically ill appering). He is not toxic-appearing.   HENT:      Head: Normocephalic and atraumatic.      Nose:      Comments: 2L NC in place      Mouth/Throat:      Pharynx: No oropharyngeal exudate.   Eyes:      Conjunctiva/sclera: Conjunctivae normal.      Pupils: Pupils are equal, round, and reactive to light.   Cardiovascular:      Rate and Rhythm: Regular rhythm. Tachycardia present.      Heart sounds: Normal heart sounds. No murmur heard.    No gallop.   Pulmonary:      Effort: Pulmonary effort is normal. No respiratory distress.      Breath sounds: Normal breath sounds. No wheezing.      Comments: Diminished air movement, no wheezing  Chest:      Chest wall: No tenderness.   Abdominal:      General: Abdomen is flat. Bowel sounds are normal. There is no distension.      Palpations: Abdomen is soft. There is no mass.      Tenderness: There is no abdominal tenderness.   Musculoskeletal:         General: No tenderness. Normal range of motion.      Cervical back: Normal range of motion and neck supple.   Lymphadenopathy:      Cervical: No cervical adenopathy.    Skin:     General: Skin is warm and dry.      Capillary Refill: Capillary refill takes less than 2 seconds.      Findings: No rash.   Neurological:      Mental Status: He is alert and oriented to person, place, and time.      Cranial Nerves: No cranial nerve deficit.      Sensory: No sensory deficit.      Coordination: Coordination normal.   Psychiatric:         Behavior: Behavior normal.         Thought Content: Thought content normal.         Judgment: Judgment normal.         CRANIAL NERVES     CN III, IV, VI   Pupils are equal, round, and reactive to light.     Significant Labs: All pertinent labs within the past 24 hours have been reviewed.  CBC:   Recent Labs   Lab 11/18/22 0438   WBC 7.50   HGB 12.4*   HCT 40.1        CMP:   Recent Labs   Lab 11/18/22 0438      K 3.3*   CL 95   CO2 30*   *   BUN 4*   CREATININE 0.8   CALCIUM 9.0   PROT 7.2   ALBUMIN 3.3*   BILITOT 0.4   ALKPHOS 83   AST 34   ALT 25   ANIONGAP 11     Cardiac Markers:   Recent Labs   Lab 11/18/22 0438   BNP 54     Lipid Panel: No results for input(s): CHOL, HDL, LDLCALC, TRIG, CHOLHDL in the last 48 hours.  Magnesium: No results for input(s): MG in the last 48 hours.  Troponin:   Recent Labs   Lab 11/18/22 0438 11/18/22  0901   TROPONINI 0.034* 0.031*     TSH: No results for input(s): TSH in the last 4320 hours.    Significant Imaging: I have reviewed all pertinent imaging results/findings within the past 24 hours.  Imaging Results              X-Ray Chest AP Portable (Final result)  Result time 11/18/22 05:53:08      Final result by Mary Bermudez MD (11/18/22 05:53:08)                   Impression:      Please see above.      Electronically signed by: Mary Bermudez MD  Date:    11/18/2022  Time:    05:53               Narrative:    EXAMINATION:  XR CHEST AP PORTABLE    CLINICAL HISTORY:  Shortness of breath    TECHNIQUE:  Single frontal view of the chest was performed.    COMPARISON:  Cardiac monitoring leads overlie  the chest.    FINDINGS:  Cardiac monitoring leads overlie the chest.  The cardiomediastinal silhouette is unchanged in size and configuration noting atherosclerosis of the thoracic aorta.  Mediastinal structures are midline.  The lungs are hyperexpanded with diffuse coarse interstitial attenuation similar to prior exams.  Findings suggestive of COPD/emphysema.  There is a thin linear opacity in the right mid lung zone suggestive of atelectasis and/or scarring, unchanged.  No definite new confluent airspace consolidation, substantial volume of pleural fluid or pneumothorax.  Previously identified pulmonary nodule seen on CTA chest 08/11/2022 are not as well delineated on this single radiographic view of the chest.  Osseous structures are intact.

## 2022-11-19 NOTE — HOSPITAL COURSE
Baltazar Mccray was admitted to hospital medicine for management of acute COPD exacerbation. Acidotic and hypercapnic on ABG requiring several hours on BiPAP to improve pH. Treated with IV magnesium, scheduled duonebs, IV steroids that were transitioned to PO and nightly BiPAP. Oxygen requirement improved but still with work of breathing and desaturations on exertion. Notable for increased wheezing even with increased work of breathing, replaced PO steroids with IV and increased frequency of scheduled duonebs. TTE ordered for non-improving orthopnea and persistent crackles on exam, unchanged from prior. After treatment patient appears to return to baseline, O2 saturations >95% on normal home 2L supplemental oxygen. Lung exam improved on auscultation.    On day of discharge patient's vital signs were stable and patient appeared clinically ready for discharge. Discussed hospital course, discharge plan and return precautions - he expressed understanding. All questions answered at that time.

## 2022-11-20 LAB
ANION GAP SERPL CALC-SCNC: 11 MMOL/L (ref 8–16)
BASOPHILS # BLD AUTO: 0.01 K/UL (ref 0–0.2)
BASOPHILS NFR BLD: 0.1 % (ref 0–1.9)
BUN SERPL-MCNC: 15 MG/DL (ref 6–20)
CALCIUM SERPL-MCNC: 9.5 MG/DL (ref 8.7–10.5)
CHLORIDE SERPL-SCNC: 95 MMOL/L (ref 95–110)
CO2 SERPL-SCNC: 30 MMOL/L (ref 23–29)
CREAT SERPL-MCNC: 0.8 MG/DL (ref 0.5–1.4)
DIFFERENTIAL METHOD: ABNORMAL
EOSINOPHIL # BLD AUTO: 0 K/UL (ref 0–0.5)
EOSINOPHIL NFR BLD: 0 % (ref 0–8)
ERYTHROCYTE [DISTWIDTH] IN BLOOD BY AUTOMATED COUNT: 18.5 % (ref 11.5–14.5)
EST. GFR  (NO RACE VARIABLE): >60 ML/MIN/1.73 M^2
GLUCOSE SERPL-MCNC: 68 MG/DL (ref 70–110)
HCT VFR BLD AUTO: 40.8 % (ref 40–54)
HGB BLD-MCNC: 12.4 G/DL (ref 14–18)
IMM GRANULOCYTES # BLD AUTO: 0.07 K/UL (ref 0–0.04)
IMM GRANULOCYTES NFR BLD AUTO: 0.4 % (ref 0–0.5)
LYMPHOCYTES # BLD AUTO: 1.5 K/UL (ref 1–4.8)
LYMPHOCYTES NFR BLD: 7.9 % (ref 18–48)
MAGNESIUM SERPL-MCNC: 1.9 MG/DL (ref 1.6–2.6)
MCH RBC QN AUTO: 24.7 PG (ref 27–31)
MCHC RBC AUTO-ENTMCNC: 30.4 G/DL (ref 32–36)
MCV RBC AUTO: 81 FL (ref 82–98)
MONOCYTES # BLD AUTO: 1.4 K/UL (ref 0.3–1)
MONOCYTES NFR BLD: 7.5 % (ref 4–15)
NEUTROPHILS # BLD AUTO: 15.6 K/UL (ref 1.8–7.7)
NEUTROPHILS NFR BLD: 84.1 % (ref 38–73)
NRBC BLD-RTO: 0 /100 WBC
PHOSPHATE SERPL-MCNC: 2.7 MG/DL (ref 2.7–4.5)
PLATELET # BLD AUTO: 438 K/UL (ref 150–450)
PMV BLD AUTO: 10.1 FL (ref 9.2–12.9)
POTASSIUM SERPL-SCNC: 4.1 MMOL/L (ref 3.5–5.1)
RBC # BLD AUTO: 5.02 M/UL (ref 4.6–6.2)
SODIUM SERPL-SCNC: 136 MMOL/L (ref 136–145)
WBC # BLD AUTO: 18.52 K/UL (ref 3.9–12.7)

## 2022-11-20 PROCEDURE — 63600175 PHARM REV CODE 636 W HCPCS

## 2022-11-20 PROCEDURE — 84100 ASSAY OF PHOSPHORUS: CPT

## 2022-11-20 PROCEDURE — 94660 CPAP INITIATION&MGMT: CPT

## 2022-11-20 PROCEDURE — 94640 AIRWAY INHALATION TREATMENT: CPT

## 2022-11-20 PROCEDURE — 25000003 PHARM REV CODE 250: Performed by: HOSPITALIST

## 2022-11-20 PROCEDURE — 63600175 PHARM REV CODE 636 W HCPCS: Performed by: HOSPITALIST

## 2022-11-20 PROCEDURE — 20600001 HC STEP DOWN PRIVATE ROOM

## 2022-11-20 PROCEDURE — 25000003 PHARM REV CODE 250

## 2022-11-20 PROCEDURE — 80048 BASIC METABOLIC PNL TOTAL CA: CPT

## 2022-11-20 PROCEDURE — 94761 N-INVAS EAR/PLS OXIMETRY MLT: CPT

## 2022-11-20 PROCEDURE — 83735 ASSAY OF MAGNESIUM: CPT

## 2022-11-20 PROCEDURE — 25000242 PHARM REV CODE 250 ALT 637 W/ HCPCS

## 2022-11-20 PROCEDURE — 99900035 HC TECH TIME PER 15 MIN (STAT)

## 2022-11-20 PROCEDURE — 85025 COMPLETE CBC W/AUTO DIFF WBC: CPT

## 2022-11-20 PROCEDURE — 36415 COLL VENOUS BLD VENIPUNCTURE: CPT

## 2022-11-20 PROCEDURE — 99233 PR SUBSEQUENT HOSPITAL CARE,LEVL III: ICD-10-PCS | Mod: ,,,

## 2022-11-20 PROCEDURE — 99233 SBSQ HOSP IP/OBS HIGH 50: CPT | Mod: ,,,

## 2022-11-20 PROCEDURE — 27000221 HC OXYGEN, UP TO 24 HOURS

## 2022-11-20 RX ORDER — SODIUM,POTASSIUM PHOSPHATES 280-250MG
1 POWDER IN PACKET (EA) ORAL EVERY 4 HOURS
Status: COMPLETED | OUTPATIENT
Start: 2022-11-20 | End: 2022-11-20

## 2022-11-20 RX ORDER — IPRATROPIUM BROMIDE 0.5 MG/2.5ML
0.5 SOLUTION RESPIRATORY (INHALATION) EVERY 4 HOURS
Status: DISCONTINUED | OUTPATIENT
Start: 2022-11-20 | End: 2022-11-20

## 2022-11-20 RX ORDER — IPRATROPIUM BROMIDE AND ALBUTEROL SULFATE 2.5; .5 MG/3ML; MG/3ML
3 SOLUTION RESPIRATORY (INHALATION)
Status: DISCONTINUED | OUTPATIENT
Start: 2022-11-20 | End: 2022-11-21 | Stop reason: HOSPADM

## 2022-11-20 RX ADMIN — FOLIC ACID 1 MG: 1 TABLET ORAL at 09:11

## 2022-11-20 RX ADMIN — SENNOSIDES 8.6 MG: 8.6 TABLET, FILM COATED ORAL at 09:11

## 2022-11-20 RX ADMIN — Medication 100 MG: at 09:11

## 2022-11-20 RX ADMIN — ENOXAPARIN SODIUM 40 MG: 40 INJECTION SUBCUTANEOUS at 05:11

## 2022-11-20 RX ADMIN — ACETAMINOPHEN 650 MG: 325 TABLET ORAL at 09:11

## 2022-11-20 RX ADMIN — FUROSEMIDE 40 MG: 40 TABLET ORAL at 09:11

## 2022-11-20 RX ADMIN — IPRATROPIUM BROMIDE 0.5 MG: 0.5 SOLUTION RESPIRATORY (INHALATION) at 04:11

## 2022-11-20 RX ADMIN — IPRATROPIUM BROMIDE AND ALBUTEROL SULFATE 3 ML: 2.5; .5 SOLUTION RESPIRATORY (INHALATION) at 09:11

## 2022-11-20 RX ADMIN — PREDNISONE 40 MG: 20 TABLET ORAL at 09:11

## 2022-11-20 RX ADMIN — THERA TABS 1 TABLET: TAB at 09:11

## 2022-11-20 RX ADMIN — ALUMINUM HYDROXIDE, MAGNESIUM HYDROXIDE, AND SIMETHICONE 30 ML: 200; 200; 20 SUSPENSION ORAL at 03:11

## 2022-11-20 RX ADMIN — METHYLPREDNISOLONE SODIUM SUCCINATE 80 MG: 40 INJECTION, POWDER, FOR SOLUTION INTRAMUSCULAR; INTRAVENOUS at 05:11

## 2022-11-20 RX ADMIN — LEVALBUTEROL HYDROCHLORIDE 0.63 MG: 0.63 SOLUTION RESPIRATORY (INHALATION) at 07:11

## 2022-11-20 RX ADMIN — IPRATROPIUM BROMIDE AND ALBUTEROL SULFATE 3 ML: 2.5; .5 SOLUTION RESPIRATORY (INHALATION) at 03:11

## 2022-11-20 RX ADMIN — NIFEDIPINE 30 MG: 30 TABLET, FILM COATED, EXTENDED RELEASE ORAL at 09:11

## 2022-11-20 RX ADMIN — LEVALBUTEROL HYDROCHLORIDE 0.63 MG: 0.63 SOLUTION RESPIRATORY (INHALATION) at 12:11

## 2022-11-20 RX ADMIN — IPRATROPIUM BROMIDE 0.5 MG: 0.5 SOLUTION RESPIRATORY (INHALATION) at 12:11

## 2022-11-20 RX ADMIN — MUPIROCIN: 20 OINTMENT TOPICAL at 09:11

## 2022-11-20 RX ADMIN — FERROUS SULFATE TAB 325 MG (65 MG ELEMENTAL FE) 1 EACH: 325 (65 FE) TAB at 09:11

## 2022-11-20 RX ADMIN — IPRATROPIUM BROMIDE 0.5 MG: 0.5 SOLUTION RESPIRATORY (INHALATION) at 11:11

## 2022-11-20 RX ADMIN — POLYETHYLENE GLYCOL 3350 17 G: 17 POWDER, FOR SOLUTION ORAL at 09:11

## 2022-11-20 RX ADMIN — FUROSEMIDE 40 MG: 40 TABLET ORAL at 05:11

## 2022-11-20 RX ADMIN — IPRATROPIUM BROMIDE 0.5 MG: 0.5 SOLUTION RESPIRATORY (INHALATION) at 07:11

## 2022-11-20 RX ADMIN — POTASSIUM & SODIUM PHOSPHATES POWDER PACK 280-160-250 MG 1 PACKET: 280-160-250 PACK at 09:11

## 2022-11-20 RX ADMIN — POTASSIUM & SODIUM PHOSPHATES POWDER PACK 280-160-250 MG 1 PACKET: 280-160-250 PACK at 05:11

## 2022-11-20 NOTE — ASSESSMENT & PLAN NOTE
- CHF, appears euvolemic, controlled  - continue home lasix 40 mg daily   - continued orthopnea and rales, increased lasix to 40 BID    - repeat TTE ordered   BNP  Recent Labs   Lab 11/18/22  0438   BNP 54     Results for orders placed during the hospital encounter of 08/10/22    Echo    Interpretation Summary  · The left ventricle is normal in size with concentric remodeling and normal systolic function.  · The estimated ejection fraction is 55%.  · Indeterminate left ventricular diastolic function.  · There is abnormal septal wall motion.  · Normal right ventricular size with normal right ventricular systolic function.  · Intermediate central venous pressure (8 mmHg).  · Severe left atrial enlargement.  · An interatrial septal aneurysm is present.

## 2022-11-20 NOTE — PROGRESS NOTES
[facility] ([place] [address])  [facility]  Progress Note    Patient Name: Baltazar Mccray  MRN: [id number]  Patient Class: IP- Inpatient   Admission Date: 11/18/2022  Length of Stay: 2 days  Attending Physician: Hien Kay MD  Primary Care Provider: Rhonda Of Katia        Subjective:     Principal Problem:COPD exacerbation        HPI:  Baltazar Mccray is a 62yM with h/o COPD on  2LNC home O2, HTN, HFpEF, seizure disorder admitted to hospital medicine for management of acute COPD exacerbation. Patient reports gradually worsening shortness of breath that started 2 days ago. Endorses associated dry cough, decreased PO intake and nausea with SOB onset. Denies alleviating or aggravating factors. Used home MDI, nebulizer treatments and nightly BiPAP without improvement of symptoms. States symptoms are similar to prior COPD flares. Denies fever, chills, vomiting, abdominal pain, diarrhea, chest pain, extremity numbness or weakness.       In ED: T 98.3 F, , R 22, /96 and SpO2 97% on NRB mask. CBC without leukocytosis, CMP with mild hypokalemia and metabolic alkalosis. CXR with findings suggestive of COPD/emphysema.  There is a thin linear opacity in the right mid lung zone suggestive of atelectasis and/or scarring, unchanged.  No definite new confluent airspace consolidation, substantial volume of pleural fluid or pneumothorax. EKG with sinus tachycardia. Trop elevated to 0.034, similar to baseline. Flu and COVID swabs negative.  He received 1 DuoNeb and 125 mg of Solu-Medrol on route with EMS. Received duonebs x 3 + steroids with minimal improvement. Placed on continuous albuterol x 1 hour, persistent tachypnea  - admitted to hospital medicine for further management.      Overview/Hospital Course:  Baltazar Mccray was admitted to hospital medicine for management of acute COPD exacerbation. Acidotic and hypercapnic on ABG requiring several hours on BiPAP to improve pH. Treated with IV  magnesium, scheduled duonebs, IV steroids that were transitioned to PO and nightly BiPAP. Oxygen requirement improved but still with work of breathing and desaturations on exertion. Notable for increased wheezing even with increased work of breathing, replaced PO steroids with IV and increased frequency of scheduled duonebs. TTE ordered for non-improving orthopnea and persistent crackles on exam.       Interval History:  NAEON. AFVSS.  Patient evaluated at bedside. Improved work of breathing but worsened wheezing on exam. On 2L NC saturations at 96%. Reports he does not feel back to his baseline and expresses worries about leaving the hospital with his symptoms.  Transitioned to IV steroids, increased frequency of duonebs. Endorses non-improving orthopnea as well. TTE ordered.    Review of Systems   Constitutional:  Negative for activity change, chills and fever.   HENT:  Negative for trouble swallowing.    Eyes:  Negative for photophobia and visual disturbance.   Respiratory:  Positive for cough, shortness of breath and wheezing. Negative for chest tightness.         Orthopnea    Cardiovascular:  Negative for chest pain, palpitations and leg swelling.   Gastrointestinal:  Negative for abdominal pain, constipation, diarrhea, nausea and vomiting.   Genitourinary:  Negative for dysuria, frequency, hematuria and urgency.   Musculoskeletal:  Negative for arthralgias, back pain and gait problem.   Skin:  Negative for color change and rash.   Neurological:  Negative for dizziness, syncope, weakness, light-headedness, numbness and headaches.   Psychiatric/Behavioral:  Negative for agitation and confusion. The patient is not nervous/anxious.    Objective:     Vital Signs (Most Recent):  Temp: 98.6 °F (37 °C) (11/20/22 1250)  Pulse: 77 (11/20/22 1250)  Resp: 18 (11/20/22 1147)  BP: (!) 180/99 (11/20/22 1250)  SpO2: 99 % (11/20/22 1250)   Vital Signs (24h Range):  Temp:  [96.6 °F (35.9 °C)-98.9 °F (37.2 °C)] 98.6 °F (37  °C)  Pulse:  [] 77  Resp:  [16-28] 18  SpO2:  [92 %-100 %] 99 %  BP: (135-180)/(76-99) 180/99        There is no height or weight on file to calculate BMI.    Intake/Output Summary (Last 24 hours) at 11/20/2022 1353  Last data filed at 11/20/2022 0928  Gross per 24 hour   Intake 940 ml   Output 1845 ml   Net -905 ml      Physical Exam  Vitals and nursing note reviewed.   Constitutional:       General: He is not in acute distress.     Appearance: He is well-developed. He is ill-appearing (chronically ill appearing).   HENT:      Head: Normocephalic and atraumatic.      Mouth/Throat:      Pharynx: No oropharyngeal exudate.   Eyes:      Conjunctiva/sclera: Conjunctivae normal.      Pupils: Pupils are equal, round, and reactive to light.   Cardiovascular:      Rate and Rhythm: Normal rate and regular rhythm.      Heart sounds: Normal heart sounds. No murmur heard.    No gallop.   Pulmonary:      Effort: Pulmonary effort is normal. No respiratory distress.      Breath sounds: No stridor. Wheezing (diffuse end expiratory wheezes throughout) and rales (inspiratory bibasilar rales) present. No rhonchi.   Abdominal:      General: Bowel sounds are normal. There is no distension.      Palpations: Abdomen is soft.      Tenderness: There is no abdominal tenderness.   Musculoskeletal:         General: No tenderness. Normal range of motion.      Cervical back: Normal range of motion and neck supple.   Lymphadenopathy:      Cervical: No cervical adenopathy.   Skin:     General: Skin is warm and dry.      Capillary Refill: Capillary refill takes less than 2 seconds.      Findings: No rash.   Neurological:      Mental Status: He is alert and oriented to person, place, and time.      Cranial Nerves: No cranial nerve deficit.      Sensory: No sensory deficit.      Coordination: Coordination normal.   Psychiatric:         Behavior: Behavior normal.         Thought Content: Thought content normal.         Judgment: Judgment normal.        Significant Labs: All pertinent labs within the past 24 hours have been reviewed.  CBC:   Recent Labs   Lab 11/19/22  0712 11/20/22  0604   WBC 17.73* 18.52*   HGB 12.4* 12.4*   HCT 39.5* 40.8    438     CMP:   Recent Labs   Lab 11/19/22  0712 11/20/22  0604   * 136   K 4.0 4.1   CL 97 95   CO2 27 30*   * 68*   BUN 8 15   CREATININE 0.7 0.8   CALCIUM 9.8 9.5   ANIONGAP 11 11       Significant Imaging: I have reviewed all pertinent imaging results/findings within the past 24 hours.      Assessment/Plan:      * COPD exacerbation  BiPAP (biphasic positive airway pressure) dependence  Patient with worsening shortness of breath and work of breathing for the past 2 days. Not improved with home MDI, duo nebs or BiPAP. Hypoxic to 93% on 2 L NC (home O2 is 2L) with continued increase work of breathing and shortness of breath.     - reports increased sputum production, increased dyspnea. Tachycardic to 120s and tachypenic to RR 26 on admission  - does require 2L supplemental oxygen at baseline  - pulse oximetry 91% on room air/ 94% on 2L nasal cannula on admission [goal 88%-92%]  - CXR demonstrated suggestive of COPD/emphysema. No definite new confluent airspace consolidation, substantial volume of pleural fluid or pneumothorax.   - procalcitonin <0.02  - Afebrile without leukocytosis  - Arterial Blood Gas result:  pO2 40; pCO2 52; pH 7.311;  HCO3 35.5, %O2 Sat 67 on admission   - repeat ABG after BiPAP improved to pO2 106; pCO2 70.3; pH 7.400;  HCO3 32.4, %O2 Sat 67  - started IV methylprednisolone 80 mg q6h  - scheduled duo nebs ordered, mucinex or airway clearance techniques if needed   - consider pulmonary rehabilitation at discharge     Acute on chronic respiratory failure with hypoxia and hypercapnia  Patient with Hypercapnic and Hypoxic Respiratory failure which is Acute on chronic.  he is on home oxygen at 2 LPM. Supplemental oxygen was provided and noted- Oxygen Concentration (%):  [100]  100.   Signs/symptoms of respiratory failure include- tachypnea and increased work of breathing. Contributing diagnoses includes - COPD Labs and images were reviewed. Patient Has recent ABG, which has been reviewed. Will treat underlying causes and adjust management of respiratory failure as follows-   - received duoneb x4 and IV solumedrol in the ED   - given IV mag   - placed on BiPAP   - repeat ABG with normalized CO2 and pH   - transitioned to bipap HS   - will monitor on telemetry  - pulse oximetry q4h     (HFpEF) heart failure with preserved ejection fraction  - CHF, appears euvolemic, controlled  - continue home lasix 40 mg daily   - continued orthopnea and rales, increased lasix to 40 BID    - repeat TTE ordered   BNP  Recent Labs   Lab 11/18/22  0438   BNP 54     Results for orders placed during the hospital encounter of 08/10/22    Echo    Interpretation Summary  · The left ventricle is normal in size with concentric remodeling and normal systolic function.  · The estimated ejection fraction is 55%.  · Indeterminate left ventricular diastolic function.  · There is abnormal septal wall motion.  · Normal right ventricular size with normal right ventricular systolic function.  · Intermediate central venous pressure (8 mmHg).  · Severe left atrial enlargement.  · An interatrial septal aneurysm is present.    Hypophosphatemia  - phos 1.8 on CMP  - treated with oral phos q4h x4 doses  - recheck on repeat BMP    Hypokalemia  - appears to be chronic, frequent lasix use  - monitor on BMP  - replace as needed  - cardiac telemetry    BiPAP (biphasic positive airway pressure) dependence  - BiPAP HS    Atrial fibrillation  Patient with Long standing persistent (>12 months) atrial fibrillation which is controlled currently with Calcium Channel Blocker. Patient is currently in sinus rhythm.[id number] Score: 1. Anticoagulation not indicated due to CHADsVAsC of 1.    Alcohol use disorder, mild, abuse  - chronic  condition  - no signs or symptoms of withdrawal on admission  - CIWA q4h  - will initiate benzodiazepine therapy for any signs of withdrawal     Cigarette nicotine dependence without complication  - nicotine patch prn     Elevated troponin  Recent Labs   Lab 11/19/22  0712   TROPONINI 0.027*   - chronic condition  - appears to be at baseline   - EKG sinus tach, low suspicion for ACS    Essential hypertension  Chronic condition  Continue home medications NIFEdipine (PROCARDIA-XL) 30 MG (OSM) 24 hr tablet  Vitals q4h      VTE Risk Mitigation (From admission, onward)         Ordered     enoxaparin injection 40 mg  Daily         11/18/22 1951     IP VTE HIGH RISK PATIENT  Once         11/18/22 1951     Place sequential compression device  Until discontinued         11/18/22 0649     Place sequential compression device  Until discontinued         11/18/22 0649                Discharge Planning   RUTH: 11/20/2022     Code Status: Full Code   Is the patient medically ready for discharge?: No    Reason for patient still in hospital (select all that apply): Patient trending condition, Treatment and Imaging           Libby Holt PA-C  Department of Hospital Medicine   [facility] ([place])

## 2022-11-20 NOTE — ASSESSMENT & PLAN NOTE
BiPAP (biphasic positive airway pressure) dependence  Patient with worsening shortness of breath and work of breathing for the past 2 days. Not improved with home MDI, duo nebs or BiPAP. Hypoxic to 93% on 2 L NC (home O2 is 2L) with continued increase work of breathing and shortness of breath.     - reports increased sputum production, increased dyspnea. Tachycardic to 120s and tachypenic to RR 26  - does require 2L supplemental oxygen at baseline  - pulse oximetry 91% on room air/ 94% on 2L nasal cannula on admission [goal 88%-92%]   - 6 minute walk test ordered   - CXR demonstrated suggestive of COPD/emphysema. No definite new confluent airspace consolidation, substantial volume of pleural fluid or pneumothorax.   - procalcitonin <0.02  - Afebrile without leukocytosis  - Arterial Blood Gas result:  pO2 40; pCO2 52; pH 7.311;  HCO3 35.5, %O2 Sat 67 on admission   - repeat ABG after BiPAP improved to pO2 106; pCO2 70.3; pH 7.400;  HCO3 32.4, %O2 Sat 67  - started IV methylprednisolone 80 mg     - transitioned to 40 mg PO prednisone 11/19  - scheduled ipratropium and levalbuterol nebs ordered, mucinex or airway clearance techniques if needed   - consider pulmonary rehabilitation at discharge

## 2022-11-20 NOTE — ASSESSMENT & PLAN NOTE
Patient with Hypercapnic and Hypoxic Respiratory failure which is Acute on chronic.  he is on home oxygen at 2 LPM. Supplemental oxygen was provided and noted- Oxygen Concentration (%):  [100] 100.   Signs/symptoms of respiratory failure include- tachypnea and increased work of breathing. Contributing diagnoses includes - COPD Labs and images were reviewed. Patient Has recent ABG, which has been reviewed. Will treat underlying causes and adjust management of respiratory failure as follows-   - received duoneb x4 and IV solumedrol in the ED   - given IV mag   - placed on BiPAP   - repeat ABG with normalized CO2 and pH   - transitioned to bipap HS   - will monitor on telemetry  - pulse oximetry q4h

## 2022-11-20 NOTE — ASSESSMENT & PLAN NOTE
Patient with Hypercapnic and Hypoxic Respiratory failure which is Acute on chronic.  he is on home oxygen at 2 LPM. Supplemental oxygen was provided and noted- Oxygen Concentration (%):  [100] 100.   Signs/symptoms of respiratory failure include- tachypnea and increased work of breathing. Contributing diagnoses includes - COPD Labs and images were reviewed. Patient Has recent ABG, which has been reviewed. Will treat underlying causes and adjust management of respiratory failure as follows-   - received duoneb x4 and IV solumedrol in the ED   - given IV mag   - placed on BiPAP   - repeat ABG with normalized CO2 and pH  - will monitor on telemetry  - pulse oximetry q4h

## 2022-11-20 NOTE — PLAN OF CARE
Problem: Adult Inpatient Plan of Care  Goal: Plan of Care Review  Outcome: Ongoing, Progressing  Goal: Patient-Specific Goal (Individualized)  Outcome: Ongoing, Progressing  Goal: Absence of Hospital-Acquired Illness or Injury  Outcome: Ongoing, Progressing  Goal: Optimal Comfort and Wellbeing  Outcome: Ongoing, Progressing  Goal: Readiness for Transition of Care  Outcome: Ongoing, Progressing     Problem: Adjustment to Illness COPD (Chronic Obstructive Pulmonary Disease)  Goal: Optimal Chronic Illness Coping  Outcome: Ongoing, Progressing     Problem: Functional Ability Impaired COPD (Chronic Obstructive Pulmonary Disease)  Goal: Optimal Level of Functional [place]  Outcome: Ongoing, Progressing     Problem: Infection COPD (Chronic Obstructive Pulmonary Disease)  Goal: Absence of Infection Signs and Symptoms  Outcome: Ongoing, Progressing     Problem: Oral Intake Inadequate COPD (Chronic Obstructive Pulmonary Disease)  Goal: Improved Nutrition Intake  Outcome: Ongoing, Progressing     Problem: Respiratory Compromise COPD (Chronic Obstructive Pulmonary Disease)  Goal: Effective Oxygenation and Ventilation  Outcome: Ongoing, Progressing     Problem: Infection  Goal: Absence of Infection Signs and Symptoms  Outcome: Ongoing, Progressing

## 2022-11-20 NOTE — ASSESSMENT & PLAN NOTE
BiPAP (biphasic positive airway pressure) dependence  Patient with worsening shortness of breath and work of breathing for the past 2 days. Not improved with home MDI, duo nebs or BiPAP. Hypoxic to 93% on 2 L NC (home O2 is 2L) with continued increase work of breathing and shortness of breath.     - reports increased sputum production, increased dyspnea. Tachycardic to 120s and tachypenic to RR 26 on admission  - does require 2L supplemental oxygen at baseline  - pulse oximetry 91% on room air/ 94% on 2L nasal cannula on admission [goal 88%-92%]  - CXR demonstrated suggestive of COPD/emphysema. No definite new confluent airspace consolidation, substantial volume of pleural fluid or pneumothorax.   - procalcitonin <0.02  - Afebrile without leukocytosis  - Arterial Blood Gas result:  pO2 40; pCO2 52; pH 7.311;  HCO3 35.5, %O2 Sat 67 on admission   - repeat ABG after BiPAP improved to pO2 106; pCO2 70.3; pH 7.400;  HCO3 32.4, %O2 Sat 67  - started IV methylprednisolone 80 mg q6h  - scheduled duo nebs ordered, mucinex or airway clearance techniques if needed   - consider pulmonary rehabilitation at discharge

## 2022-11-20 NOTE — ASSESSMENT & PLAN NOTE
Recent Labs   Lab 11/19/22  0712   TROPONINI 0.027*   - chronic condition  - appears to be at baseline   - EKG sinus tach, low suspicion for ACS

## 2022-11-20 NOTE — ASSESSMENT & PLAN NOTE
Patient with Long standing persistent (>12 months) atrial fibrillation which is controlled currently with Calcium Channel Blocker. Patient is currently in sinus rhythm.[id number] Score: 1. Anticoagulation not indicated due to CHADsVAsC of 1.

## 2022-11-20 NOTE — ASSESSMENT & PLAN NOTE
Patient with Long standing persistent (>12 months) atrial fibrillation which is controlled currently with Calcium Channel Blocker. Patient is currently in sinus rhythm.[id number] Score: 1. Anticoagulation not indicated due to CHADsVAsC of 1.

## 2022-11-20 NOTE — SUBJECTIVE & OBJECTIVE
Interval History:  NAEON. AFVSS.  Patient evaluated at bedside. Improved work of breathing but worsened wheezing on exam. On 2L NC saturations at 96%. Reports he does not feel back to his baseline and expresses worries about leaving the hospital with his symptoms.  Transitioned to IV steroids, increased frequency of duonebs. Endorses non-improving orthopnea as well. TTE ordered.    Review of Systems   Constitutional:  Negative for activity change, chills and fever.   HENT:  Negative for trouble swallowing.    Eyes:  Negative for photophobia and visual disturbance.   Respiratory:  Positive for cough, shortness of breath and wheezing. Negative for chest tightness.         Orthopnea    Cardiovascular:  Negative for chest pain, palpitations and leg swelling.   Gastrointestinal:  Negative for abdominal pain, constipation, diarrhea, nausea and vomiting.   Genitourinary:  Negative for dysuria, frequency, hematuria and urgency.   Musculoskeletal:  Negative for arthralgias, back pain and gait problem.   Skin:  Negative for color change and rash.   Neurological:  Negative for dizziness, syncope, weakness, light-headedness, numbness and headaches.   Psychiatric/Behavioral:  Negative for agitation and confusion. The patient is not nervous/anxious.    Objective:     Vital Signs (Most Recent):  Temp: 98.6 °F (37 °C) (11/20/22 1250)  Pulse: 77 (11/20/22 1250)  Resp: 18 (11/20/22 1147)  BP: (!) 180/99 (11/20/22 1250)  SpO2: 99 % (11/20/22 1250)   Vital Signs (24h Range):  Temp:  [96.6 °F (35.9 °C)-98.9 °F (37.2 °C)] 98.6 °F (37 °C)  Pulse:  [] 77  Resp:  [16-28] 18  SpO2:  [92 %-100 %] 99 %  BP: (135-180)/(76-99) 180/99        There is no height or weight on file to calculate BMI.    Intake/Output Summary (Last 24 hours) at 11/20/2022 1353  Last data filed at 11/20/2022 0928  Gross per 24 hour   Intake 940 ml   Output 1845 ml   Net -905 ml      Physical Exam  Vitals and nursing note reviewed.   Constitutional:       General:  He is not in acute distress.     Appearance: He is well-developed. He is ill-appearing (chronically ill appearing).   HENT:      Head: Normocephalic and atraumatic.      Mouth/Throat:      Pharynx: No oropharyngeal exudate.   Eyes:      Conjunctiva/sclera: Conjunctivae normal.      Pupils: Pupils are equal, round, and reactive to light.   Cardiovascular:      Rate and Rhythm: Normal rate and regular rhythm.      Heart sounds: Normal heart sounds. No murmur heard.    No gallop.   Pulmonary:      Effort: Pulmonary effort is normal. No respiratory distress.      Breath sounds: No stridor. Wheezing (diffuse end expiratory wheezes throughout) and rales (inspiratory bibasilar rales) present. No rhonchi.   Abdominal:      General: Bowel sounds are normal. There is no distension.      Palpations: Abdomen is soft.      Tenderness: There is no abdominal tenderness.   Musculoskeletal:         General: No tenderness. Normal range of motion.      Cervical back: Normal range of motion and neck supple.   Lymphadenopathy:      Cervical: No cervical adenopathy.   Skin:     General: Skin is warm and dry.      Capillary Refill: Capillary refill takes less than 2 seconds.      Findings: No rash.   Neurological:      Mental Status: He is alert and oriented to person, place, and time.      Cranial Nerves: No cranial nerve deficit.      Sensory: No sensory deficit.      Coordination: Coordination normal.   Psychiatric:         Behavior: Behavior normal.         Thought Content: Thought content normal.         Judgment: Judgment normal.       Significant Labs: All pertinent labs within the past 24 hours have been reviewed.  CBC:   Recent Labs   Lab 11/19/22  0712 11/20/22  0604   WBC 17.73* 18.52*   HGB 12.4* 12.4*   HCT 39.5* 40.8    438     CMP:   Recent Labs   Lab 11/19/22  0712 11/20/22  0604   * 136   K 4.0 4.1   CL 97 95   CO2 27 30*   * 68*   BUN 8 15   CREATININE 0.7 0.8   CALCIUM 9.8 9.5   ANIONGAP 11 11        Significant Imaging: I have reviewed all pertinent imaging results/findings within the past 24 hours.

## 2022-11-20 NOTE — PROGRESS NOTES
[facility] - [facility] ([place] [address])  [facility]  Progress Note    Patient Name: Baltazar Mccray  MRN: [id number]  Patient Class: IP- Inpatient   Admission Date: 11/18/2022  Length of Stay: 1 days  Attending Physician: Hien Kay MD  Primary Care Provider: [facility]        Subjective:     Principal Problem:COPD exacerbation        HPI:  Baltazar Mccray is a 62yM with h/o COPD on  2LNC home O2, HTN, HFpEF, seizure disorder admitted to hospital medicine for management of acute COPD exacerbation. Patient reports gradually worsening shortness of breath that started 2 days ago. Endorses associated dry cough, decreased PO intake and nausea with SOB onset. Denies alleviating or aggravating factors. Used home MDI, nebulizer treatments and nightly BiPAP without improvement of symptoms. States symptoms are similar to prior COPD flares. Denies fever, chills, vomiting, abdominal pain, diarrhea, chest pain, extremity numbness or weakness.       In ED: T 98.3 F, , R 22, /96 and SpO2 97% on NRB mask. CBC without leukocytosis, CMP with mild hypokalemia and metabolic alkalosis. CXR with findings suggestive of COPD/emphysema.  There is a thin linear opacity in the right mid lung zone suggestive of atelectasis and/or scarring, unchanged.  No definite new confluent airspace consolidation, substantial volume of pleural fluid or pneumothorax. EKG with sinus tachycardia. Trop elevated to 0.034, similar to baseline. Flu and COVID swabs negative.  He received 1 DuoNeb and 125 mg of Solu-Medrol on route with EMS. Received duonebs x 3 + steroids with minimal improvement. Placed on continuous albuterol x 1 hour, persistent tachypnea  - admitted to hospital medicine for further management.      Overview/Hospital Course:  Baltazar Mccray was admitted to hospital medicine for management of acute COPD exacerbation. Acidotic and hypercapnic on ABG requiring several hours on BiPAP to improve pH. Treated with IV  magnesium, scheduled duonebs, IV steroids that were transitioned to PO and nightly BiPAP. Oxygen requirement improved but still with work of breathing and desaturations on exertion.       Interval History:  NAEON. AFVSS.   Evaluated at bedside, NAD. Notable desaturations to low 90s while on 2L NC with laying flat and exertion. Patient endorsing continued shortness of breath and work of breathing. Denies chest pain, abdominal pain or lightheadedness. No new complaints, all questions answered at that time.     Review of Systems   Constitutional:  Positive for appetite change. Negative for activity change, chills and fever.   HENT:  Negative for trouble swallowing.    Eyes:  Negative for photophobia and visual disturbance.   Respiratory:  Positive for cough and shortness of breath. Negative for chest tightness and wheezing.    Cardiovascular:  Negative for chest pain, palpitations and leg swelling.   Gastrointestinal:  Negative for abdominal pain, constipation, diarrhea, nausea and vomiting.   Genitourinary:  Negative for dysuria, frequency, hematuria and urgency.   Musculoskeletal:  Negative for arthralgias, back pain and gait problem.   Skin:  Negative for color change and rash.   Neurological:  Negative for dizziness, syncope, weakness, light-headedness, numbness and headaches.   Psychiatric/Behavioral:  Negative for agitation and confusion. The patient is not nervous/anxious.    Objective:     Vital Signs (Most Recent):  Temp: 98.4 °F (36.9 °C) (11/19/22 1520)  Pulse: (!) 119 (11/19/22 1710)  Resp: 16 (11/19/22 1710)  BP: (!) 155/76 (11/19/22 1520)  SpO2: (!) 92 % (11/19/22 1710)   Vital Signs (24h Range):  Temp:  [97.7 °F (36.5 °C)-98.4 °F (36.9 °C)] 98.4 °F (36.9 °C)  Pulse:  [] 119  Resp:  [14-20] 16  SpO2:  [92 %-100 %] 92 %  BP: (130-179)/(76-92) 155/76        There is no height or weight on file to calculate BMI.    Intake/Output Summary (Last 24 hours) at 11/19/2022 2919  Last data filed at 11/19/2022  1420  Gross per 24 hour   Intake --   Output 500 ml   Net -500 ml      Physical Exam  Vitals and nursing note reviewed.   Constitutional:       General: He is not in acute distress.     Appearance: He is well-developed. He is ill-appearing (chronically ill appearing).   HENT:      Head: Normocephalic and atraumatic.      Mouth/Throat:      Pharynx: No oropharyngeal exudate.   Eyes:      Conjunctiva/sclera: Conjunctivae normal.      Pupils: Pupils are equal, round, and reactive to light.   Cardiovascular:      Rate and Rhythm: Normal rate and regular rhythm.      Heart sounds: Normal heart sounds. No murmur heard.    No gallop.   Pulmonary:      Effort: Pulmonary effort is normal. No respiratory distress.      Breath sounds: No stridor. Rales (bibasilar inspiratory) present. No wheezing or rhonchi.   Abdominal:      General: Bowel sounds are normal. There is no distension.      Palpations: Abdomen is soft.      Tenderness: There is no abdominal tenderness.   Musculoskeletal:         General: No tenderness. Normal range of motion.      Cervical back: Normal range of motion and neck supple.   Lymphadenopathy:      Cervical: No cervical adenopathy.   Skin:     General: Skin is warm and dry.      Capillary Refill: Capillary refill takes less than 2 seconds.      Findings: No rash.   Neurological:      Mental Status: He is alert and oriented to person, place, and time.      Cranial Nerves: No cranial nerve deficit.      Sensory: No sensory deficit.      Coordination: Coordination normal.   Psychiatric:         Behavior: Behavior normal.         Thought Content: Thought content normal.         Judgment: Judgment normal.       Significant Labs: All pertinent labs within the past 24 hours have been reviewed.  CBC:   Recent Labs   Lab 11/18/22 0438 11/19/22  0712   WBC 7.50 17.73*   HGB 12.4* 12.4*   HCT 40.1 39.5*    377     CMP:   Recent Labs   Lab 11/18/22 0438 11/19/22 0712    135*   K 3.3* 4.0   CL 95 97    CO2 30* 27   * 131*   BUN 4* 8   CREATININE 0.8 0.7   CALCIUM 9.0 9.8   PROT 7.2  --    ALBUMIN 3.3*  --    BILITOT 0.4  --    ALKPHOS 83  --    AST 34  --    ALT 25  --    ANIONGAP 11 11       Significant Imaging: I have reviewed all pertinent imaging results/findings within the past 24 hours.      Assessment/Plan:      * COPD exacerbation  BiPAP (biphasic positive airway pressure) dependence  Patient with worsening shortness of breath and work of breathing for the past 2 days. Not improved with home MDI, duo nebs or BiPAP. Hypoxic to 93% on 2 L NC (home O2 is 2L) with continued increase work of breathing and shortness of breath.     - reports increased sputum production, increased dyspnea. Tachycardic to 120s and tachypenic to RR 26  - does require 2L supplemental oxygen at baseline  - pulse oximetry 91% on room air/ 94% on 2L nasal cannula on admission [goal 88%-92%]   - 6 minute walk test ordered   - CXR demonstrated suggestive of COPD/emphysema. No definite new confluent airspace consolidation, substantial volume of pleural fluid or pneumothorax.   - procalcitonin <0.02  - Afebrile without leukocytosis  - Arterial Blood Gas result:  pO2 40; pCO2 52; pH 7.311;  HCO3 35.5, %O2 Sat 67 on admission   - repeat ABG after BiPAP improved to pO2 106; pCO2 70.3; pH 7.400;  HCO3 32.4, %O2 Sat 67  - started IV methylprednisolone 80 mg     - transitioned to 40 mg PO prednisone 11/19  - scheduled ipratropium and levalbuterol nebs ordered, mucinex or airway clearance techniques if needed   - consider pulmonary rehabilitation at discharge     Acute on chronic respiratory failure with hypoxia and hypercapnia  Patient with Hypercapnic and Hypoxic Respiratory failure which is Acute on chronic.  he is on home oxygen at 2 LPM. Supplemental oxygen was provided and noted- Oxygen Concentration (%):  [100] 100.   Signs/symptoms of respiratory failure include- tachypnea and increased work of breathing. Contributing diagnoses  includes - COPD Labs and images were reviewed. Patient Has recent ABG, which has been reviewed. Will treat underlying causes and adjust management of respiratory failure as follows-   - received duoneb x4 and IV solumedrol in the ED   - given IV mag   - placed on BiPAP   - repeat ABG with normalized CO2 and pH  - will monitor on telemetry  - pulse oximetry q4h     (HFpEF) heart failure with preserved ejection fraction  - CHF, appears euvolemic, controlled  - continue home lasix 40 mg daily   BNP  Recent Labs   Lab 11/18/22  0438   BNP 54     Results for orders placed during the hospital encounter of 08/10/22    Echo    Interpretation Summary  · The left ventricle is normal in size with concentric remodeling and normal systolic function.  · The estimated ejection fraction is 55%.  · Indeterminate left ventricular diastolic function.  · There is abnormal septal wall motion.  · Normal right ventricular size with normal right ventricular systolic function.  · Intermediate central venous pressure (8 mmHg).  · Severe left atrial enlargement.  · An interatrial septal aneurysm is present.    Hypophosphatemia  - phos 1.8 on CMP  - treated with oral phos q4h x4 doses  - recheck on repeat BMP    Hypokalemia  - appears to be chronic, frequent lasix use  - monitor on BMP  - replace as needed  - cardiac telemetry    BiPAP (biphasic positive airway pressure) dependence  - BiPAP HS    Atrial fibrillation  Patient with Long standing persistent (>12 months) atrial fibrillation which is controlled currently with Calcium Channel Blocker. Patient is currently in sinus rhythm.[id number] Score: 1. Anticoagulation not indicated due to CHADsVAsC of 1.    Alcohol use disorder, mild, abuse  - chronic condition  - no signs or symptoms of withdrawal on admission  - CIWA q4h  - will initiate benzodiazepine therapy for any signs of withdrawal     Cigarette nicotine dependence without complication  - nicotine patch prn     Elevated  troponin  Recent Labs   Lab 11/19/22  0712   TROPONINI 0.027*   - chronic condition  - appears to be at baseline   - EKG sinus tach, low suspicion for ACS    Essential hypertension  Chronic condition  Continue home medications NIFEdipine (PROCARDIA-XL) 30 MG (OSM) 24 hr tablet  Vitals q4h      VTE Risk Mitigation (From admission, onward)         Ordered     enoxaparin injection 40 mg  Daily         11/18/22 1951     IP VTE HIGH RISK PATIENT  Once         11/18/22 1951     Place sequential compression device  Until discontinued         11/18/22 0649     Place sequential compression device  Until discontinued         11/18/22 0649                Discharge Planning   RTUH: 11/20/2022     Code Status: Full Code   Is the patient medically ready for discharge?: No    Reason for patient still in hospital (select all that apply): Patient new problem, Patient trending condition and Treatment             Libby Holt PA-C  Department of Hospital Medicine   [facility] - Intensive Care ([place]-[facility])

## 2022-11-21 VITALS
SYSTOLIC BLOOD PRESSURE: 150 MMHG | OXYGEN SATURATION: 97 % | HEIGHT: 68 IN | RESPIRATION RATE: 18 BRPM | DIASTOLIC BLOOD PRESSURE: 80 MMHG | BODY MASS INDEX: 19.05 KG/M2 | TEMPERATURE: 98 F | WEIGHT: 125.69 LBS | HEART RATE: 100 BPM

## 2022-11-21 LAB
ALLENS TEST: ABNORMAL
ANION GAP SERPL CALC-SCNC: 8 MMOL/L (ref 8–16)
AV INDEX (PROSTH): 0.68
AV MEAN GRADIENT: 2 MMHG
AV PEAK GRADIENT: 0 MMHG
AV VALVE AREA: 2.16 CM2
AV VELOCITY RATIO: 16
BASOPHILS # BLD AUTO: 0.01 K/UL (ref 0–0.2)
BASOPHILS NFR BLD: 0.1 % (ref 0–1.9)
BSA FOR ECHO PROCEDURE: 1.65 M2
BUN SERPL-MCNC: 18 MG/DL (ref 6–20)
CALCIUM SERPL-MCNC: 9.2 MG/DL (ref 8.7–10.5)
CHLORIDE SERPL-SCNC: 91 MMOL/L (ref 95–110)
CO2 SERPL-SCNC: 35 MMOL/L (ref 23–29)
CREAT SERPL-MCNC: 0.9 MG/DL (ref 0.5–1.4)
CV ECHO LV RWT: 0.29 CM
DELSYS: ABNORMAL
DIFFERENTIAL METHOD: ABNORMAL
DOP CALC AO PEAK VEL: 0.04 M/S
DOP CALC AO VTI: 19.3 CM
DOP CALC LVOT AREA: 3.2 CM2
DOP CALC LVOT DIAMETER: 2.01 CM
DOP CALC LVOT PEAK VEL: 0.64 M/S
DOP CALC LVOT STROKE VOLUME: 41.67 CM3
DOP CALCLVOT PEAK VEL VTI: 13.14 CM
E WAVE DECELERATION TIME: 237.7 MSEC
E/A RATIO: 0.84
E/E' RATIO: 3.2 M/S
ECHO LV POSTERIOR WALL: 0.68 CM (ref 0.6–1.1)
EJECTION FRACTION: 53 %
EOSINOPHIL # BLD AUTO: 0 K/UL (ref 0–0.5)
EOSINOPHIL NFR BLD: 0 % (ref 0–8)
ERYTHROCYTE [DISTWIDTH] IN BLOOD BY AUTOMATED COUNT: 18.1 % (ref 11.5–14.5)
EST. GFR  (NO RACE VARIABLE): >60 ML/MIN/1.73 M^2
FRACTIONAL SHORTENING: 27 % (ref 28–44)
GLUCOSE SERPL-MCNC: 103 MG/DL (ref 70–110)
HCO3 UR-SCNC: 35.7 MMOL/L (ref 24–28)
HCT VFR BLD AUTO: 40.2 % (ref 40–54)
HGB BLD-MCNC: 12.3 G/DL (ref 14–18)
IMM GRANULOCYTES # BLD AUTO: 0.04 K/UL (ref 0–0.04)
IMM GRANULOCYTES NFR BLD AUTO: 0.4 % (ref 0–0.5)
INTERVENTRICULAR SEPTUM: 0.57 CM (ref 0.6–1.1)
LA MAJOR: 3.2 CM
LA MINOR: 4.64 CM
LA WIDTH: 3.36 CM
LEFT ATRIUM SIZE: 2.92 CM
LEFT ATRIUM VOLUME INDEX MOD: 16.3 ML/M2
LEFT ATRIUM VOLUME INDEX: 18.8 ML/M2
LEFT ATRIUM VOLUME MOD: 27.39 CM3
LEFT ATRIUM VOLUME: 31.59 CM3
LEFT INTERNAL DIMENSION IN SYSTOLE: 3.38 CM (ref 2.1–4)
LEFT VENTRICLE DIASTOLIC VOLUME INDEX: 59.06 ML/M2
LEFT VENTRICLE DIASTOLIC VOLUME: 99.22 ML
LEFT VENTRICLE MASS INDEX: 52 G/M2
LEFT VENTRICLE SYSTOLIC VOLUME INDEX: 27.8 ML/M2
LEFT VENTRICLE SYSTOLIC VOLUME: 46.72 ML
LEFT VENTRICULAR INTERNAL DIMENSION IN DIASTOLE: 4.64 CM (ref 3.5–6)
LEFT VENTRICULAR MASS: 87.49 G
LV LATERAL E/E' RATIO: 6.4 M/S
LV SEPTAL E/E' RATIO: 2.13 M/S
LYMPHOCYTES # BLD AUTO: 0.7 K/UL (ref 1–4.8)
LYMPHOCYTES NFR BLD: 6.4 % (ref 18–48)
MAGNESIUM SERPL-MCNC: 2 MG/DL (ref 1.6–2.6)
MCH RBC QN AUTO: 24.9 PG (ref 27–31)
MCHC RBC AUTO-ENTMCNC: 30.6 G/DL (ref 32–36)
MCV RBC AUTO: 82 FL (ref 82–98)
MONOCYTES # BLD AUTO: 0.2 K/UL (ref 0.3–1)
MONOCYTES NFR BLD: 1.6 % (ref 4–15)
MV PEAK A VEL: 0.76 M/S
MV PEAK E VEL: 0.64 M/S
MV STENOSIS PRESSURE HALF TIME: 68.93 MS
MV VALVE AREA P 1/2 METHOD: 3.19 CM2
NEUTROPHILS # BLD AUTO: 9.9 K/UL (ref 1.8–7.7)
NEUTROPHILS NFR BLD: 91.5 % (ref 38–73)
NRBC BLD-RTO: 0 /100 WBC
PCO2 BLDA: 49.2 MMHG (ref 35–45)
PH SMN: 7.47 [PH] (ref 7.35–7.45)
PHOSPHATE SERPL-MCNC: 3.3 MG/DL (ref 2.7–4.5)
PLATELET # BLD AUTO: 410 K/UL (ref 150–450)
PMV BLD AUTO: 10.3 FL (ref 9.2–12.9)
PO2 BLDA: 79 MMHG (ref 80–100)
POC BE: 12 MMOL/L
POC SATURATED O2: 96 % (ref 95–100)
POC TCO2: 37 MMOL/L (ref 23–27)
POTASSIUM SERPL-SCNC: 3.9 MMOL/L (ref 3.5–5.1)
RA MAJOR: 3 CM
RA PRESSURE: 3 MMHG
RA WIDTH: 3.15 CM
RBC # BLD AUTO: 4.93 M/UL (ref 4.6–6.2)
RIGHT VENTRICULAR END-DIASTOLIC DIMENSION: 4.12 CM
SAMPLE: ABNORMAL
SINUS: 3.04 CM
SITE: ABNORMAL
SODIUM SERPL-SCNC: 134 MMOL/L (ref 136–145)
TDI LATERAL: 0.1 M/S
TDI SEPTAL: 0.3 M/S
TDI: 0.2 M/S
TRICUSPID ANNULAR PLANE SYSTOLIC EXCURSION: 2.21 CM
WBC # BLD AUTO: 10.85 K/UL (ref 3.9–12.7)

## 2022-11-21 PROCEDURE — 99900035 HC TECH TIME PER 15 MIN (STAT)

## 2022-11-21 PROCEDURE — G0008 ADMIN INFLUENZA VIRUS VAC: HCPCS | Performed by: HOSPITALIST

## 2022-11-21 PROCEDURE — 80048 BASIC METABOLIC PNL TOTAL CA: CPT

## 2022-11-21 PROCEDURE — 63600175 PHARM REV CODE 636 W HCPCS

## 2022-11-21 PROCEDURE — 83735 ASSAY OF MAGNESIUM: CPT

## 2022-11-21 PROCEDURE — 82803 BLOOD GASES ANY COMBINATION: CPT

## 2022-11-21 PROCEDURE — 90686 IIV4 VACC NO PRSV 0.5 ML IM: CPT | Performed by: HOSPITALIST

## 2022-11-21 PROCEDURE — 25000242 PHARM REV CODE 250 ALT 637 W/ HCPCS

## 2022-11-21 PROCEDURE — 90471 IMMUNIZATION ADMIN: CPT | Performed by: HOSPITALIST

## 2022-11-21 PROCEDURE — 25000003 PHARM REV CODE 250

## 2022-11-21 PROCEDURE — 27000221 HC OXYGEN, UP TO 24 HOURS

## 2022-11-21 PROCEDURE — 99239 PR HOSPITAL DISCHARGE DAY,>30 MIN: ICD-10-PCS | Mod: ,,,

## 2022-11-21 PROCEDURE — 36600 WITHDRAWAL OF ARTERIAL BLOOD: CPT

## 2022-11-21 PROCEDURE — 99239 HOSP IP/OBS DSCHRG MGMT >30: CPT | Mod: ,,,

## 2022-11-21 PROCEDURE — 1111F PR DISCHARGE MEDS RECONCILED W/ CURRENT OUTPATIENT MED LIST: ICD-10-PCS | Mod: CPTII,,,

## 2022-11-21 PROCEDURE — 94640 AIRWAY INHALATION TREATMENT: CPT

## 2022-11-21 PROCEDURE — 63600175 PHARM REV CODE 636 W HCPCS: Performed by: HOSPITALIST

## 2022-11-21 PROCEDURE — 85025 COMPLETE CBC W/AUTO DIFF WBC: CPT

## 2022-11-21 PROCEDURE — 36415 COLL VENOUS BLD VENIPUNCTURE: CPT

## 2022-11-21 PROCEDURE — 84100 ASSAY OF PHOSPHORUS: CPT

## 2022-11-21 PROCEDURE — 1111F DSCHRG MED/CURRENT MED MERGE: CPT | Mod: CPTII,,,

## 2022-11-21 RX ORDER — BUDESONIDE, GLYCOPYRROLATE, AND FORMOTEROL FUMARATE 160; 9; 4.8 UG/1; UG/1; UG/1
2 AEROSOL, METERED RESPIRATORY (INHALATION) 2 TIMES DAILY
Qty: 10.7 G | Refills: 11 | Status: SHIPPED | OUTPATIENT
Start: 2022-11-21 | End: 2022-12-21 | Stop reason: SDUPTHER

## 2022-11-21 RX ORDER — PREDNISONE 20 MG/1
40 TABLET ORAL DAILY
Status: DISCONTINUED | OUTPATIENT
Start: 2022-11-21 | End: 2022-11-21 | Stop reason: HOSPADM

## 2022-11-21 RX ORDER — FUROSEMIDE 40 MG/1
40 TABLET ORAL DAILY
Status: DISCONTINUED | OUTPATIENT
Start: 2022-11-21 | End: 2022-11-21 | Stop reason: HOSPADM

## 2022-11-21 RX ORDER — NIFEDIPINE 30 MG/1
30 TABLET, EXTENDED RELEASE ORAL DAILY
Qty: 30 TABLET | Refills: 0 | Status: ON HOLD | OUTPATIENT
Start: 2022-11-21 | End: 2022-12-09 | Stop reason: HOSPADM

## 2022-11-21 RX ORDER — ALBUTEROL SULFATE 90 UG/1
1-2 AEROSOL, METERED RESPIRATORY (INHALATION) EVERY 6 HOURS PRN
Qty: 8.5 G | Refills: 2 | Status: SHIPPED | OUTPATIENT
Start: 2022-11-21 | End: 2022-12-21 | Stop reason: SDUPTHER

## 2022-11-21 RX ORDER — PREDNISONE 20 MG/1
40 TABLET ORAL DAILY
Qty: 10 TABLET | Refills: 0 | Status: SHIPPED | OUTPATIENT
Start: 2022-11-21 | End: 2022-11-26

## 2022-11-21 RX ADMIN — NIFEDIPINE 30 MG: 30 TABLET, FILM COATED, EXTENDED RELEASE ORAL at 09:11

## 2022-11-21 RX ADMIN — PREDNISONE 40 MG: 20 TABLET ORAL at 01:11

## 2022-11-21 RX ADMIN — THERA TABS 1 TABLET: TAB at 09:11

## 2022-11-21 RX ADMIN — FERROUS SULFATE TAB 325 MG (65 MG ELEMENTAL FE) 1 EACH: 325 (65 FE) TAB at 09:11

## 2022-11-21 RX ADMIN — Medication 100 MG: at 09:11

## 2022-11-21 RX ADMIN — POLYETHYLENE GLYCOL 3350 17 G: 17 POWDER, FOR SOLUTION ORAL at 09:11

## 2022-11-21 RX ADMIN — METHYLPREDNISOLONE SODIUM SUCCINATE 80 MG: 40 INJECTION, POWDER, FOR SOLUTION INTRAMUSCULAR; INTRAVENOUS at 01:11

## 2022-11-21 RX ADMIN — INFLUENZA VIRUS VACCINE 0.5 ML: 15; 15; 15; 15 SUSPENSION INTRAMUSCULAR at 05:11

## 2022-11-21 RX ADMIN — FOLIC ACID 1 MG: 1 TABLET ORAL at 09:11

## 2022-11-21 RX ADMIN — IPRATROPIUM BROMIDE AND ALBUTEROL SULFATE 3 ML: 2.5; .5 SOLUTION RESPIRATORY (INHALATION) at 01:11

## 2022-11-21 RX ADMIN — ENOXAPARIN SODIUM 40 MG: 40 INJECTION SUBCUTANEOUS at 05:11

## 2022-11-21 RX ADMIN — FUROSEMIDE 40 MG: 40 TABLET ORAL at 09:11

## 2022-11-21 RX ADMIN — IPRATROPIUM BROMIDE AND ALBUTEROL SULFATE 3 ML: 2.5; .5 SOLUTION RESPIRATORY (INHALATION) at 10:11

## 2022-11-21 RX ADMIN — MUPIROCIN: 20 OINTMENT TOPICAL at 09:11

## 2022-11-21 RX ADMIN — SENNOSIDES 8.6 MG: 8.6 TABLET, FILM COATED ORAL at 09:11

## 2022-11-21 RX ADMIN — METHYLPREDNISOLONE SODIUM SUCCINATE 80 MG: 40 INJECTION, POWDER, FOR SOLUTION INTRAMUSCULAR; INTRAVENOUS at 06:11

## 2022-11-21 NOTE — PLAN OF CARE
Problem: Adult Inpatient Plan of Care  Goal: Plan of Care Review  Outcome: Ongoing, Progressing  Goal: Patient-Specific Goal (Individualized)  Outcome: Ongoing, Progressing  Goal: Absence of Hospital-Acquired Illness or Injury  Outcome: Ongoing, Progressing  Goal: Optimal Comfort and Wellbeing  Outcome: Ongoing, Progressing  Goal: Readiness for Transition of Care  Outcome: Ongoing, Progressing     Problem: Adjustment to Illness COPD (Chronic Obstructive Pulmonary Disease)  Goal: Optimal Chronic Illness Coping  Outcome: Ongoing, Progressing     Problem: Functional Ability Impaired COPD (Chronic Obstructive Pulmonary Disease)  Goal: Optimal Level of Functional [place]  Outcome: Ongoing, Progressing     Problem: Infection COPD (Chronic Obstructive Pulmonary Disease)  Goal: Absence of Infection Signs and Symptoms  Outcome: Ongoing, Progressing     Problem: Oral Intake Inadequate COPD (Chronic Obstructive Pulmonary Disease)  Goal: Improved Nutrition Intake  Outcome: Ongoing, Progressing     Problem: Respiratory Compromise COPD (Chronic Obstructive Pulmonary Disease)  Goal: Effective Oxygenation and Ventilation  Outcome: Ongoing, Progressing     Problem: Infection  Goal: Absence of Infection Signs and Symptoms  Outcome: Ongoing, Progressing

## 2022-11-21 NOTE — PLAN OF CARE
[facility] - Int[facility] ([facility]  Initial Discharge Assessment       Primary Care Provider: [facility]    Admission Diagnosis: Shortness of breath [R06.02]  Hypoxia [R09.02]  COPD exacerbation [J44.1]  Chest pain [R07.9]    Admission Date: 11/18/2022  Expected Discharge Date: 11/21/2022         Payor: HUMA[employer] MANAGED MEDICARE / Plan: HUMANA MEDICARE [facility] / Product Type: Medicare Advantage /     Extended Emergency Contact Information  Primary Emergency Contact: Gladis Mccray   [place]  Mobile Phone: [phone]  Relation: Sister  Secondary Emergency Contact: Viry Mccray   [place]  Mobile Phone: [phone]  Relation: Sister    Discharge Plan A: (P) Home with family  Discharge Plan B: (P) Home with family      [facility]  [address]  [facility] [address]  Phone: [phone] Fax: [phone]    [id number] [place] - [facility] AT [facility] [address]  [facility] [place] [address]  Phone: [phone] Fax: [phone]      Initial Assessment (most recent)       Adult Discharge Assessment - 11/21/22 1441          Discharge Assessment    Assessment Type Discharge Planning Assessment (P)      Confirmed/corrected address, phone number and insurance Yes (P)      Confirmed Demographics Correct on Facesheet (P)      Source of Information patient (P)      Does patient/caregiver understand observation status Yes (P)      Communicated RUTH with patient/caregiver Yes (P)      Reason For Admission shortness of breath (P)      Lives With alone (P)      Facility Arrived From: HOME (P)      Do you expect to return to your current living situation? Yes (P)      Do you have help at home or someone to help you manage your care at home? Yes (P)      Who are your caregiver(s) and their phone number(s)? sister Licona, [phone] (P)      Prior to hospitilization cognitive  status: Alert/Oriented (P)      Current cognitive status: Alert/Oriented (P)      Walking or Climbing Stairs Difficulty none (P)      Dressing/Bathing Difficulty none (P)      Home Layout Able to live on 1st floor (P)      Equipment Currently Used at Home none (P)      Readmission within 30 days? No (P)      Patient currently being followed by outpatient case management? No (P)      Do you currently have service(s) that help you manage your care at home? No (P)      Do you take prescription medications? Yes (P)      Do you have prescription coverage? Yes (P)      Coverage [employer] Managed Medicare (P)      Do you have any problems affording any of your prescribed medications? No (P)      Is the patient taking medications as prescribed? yes (P)      Who is going to help you get home at discharge? Gladis Mccray, sister [phone] (P)      How do you get to doctors appointments? car, drives self;family or friend will provide (P)      Are you on dialysis? No (P)      Do you take coumadin? No (P)      Discharge Plan A Home with family (P)      Discharge Plan B Home with family (P)      DME Needed Upon Discharge  none (P)      Discharge Plan discussed with: Patient (P)                           Rossana Carmichael LMSW  [facility]   [id number]

## 2022-11-21 NOTE — PLAN OF CARE
[facility] - Intensive Care ([facility]-14)  Discharge Final Note    Primary Care Provider: [facility]    Expected Discharge Date: 11/21/2022    Final Discharge Note (most recent)       Final Note - 11/21/22 1526          Final Note    Assessment Type Final Discharge Note (P)      Hospital Resources/Appts/Education Provided Provided patient/caregiver with written discharge plan information;Appointments scheduled and added to AVS (P)                      Important Message from Medicare  Important Message from Medicare regarding Discharge Appeal Rights: Explained to patient/caregiver, Signed/date by patient/caregiver     Date IMM was signed: 11/21/22  Time IMM was signed: 1025    Contact Info       [facility]   Relationship: PCP - General    [address]  [facility] [address]   Phone: [phone]       Next Steps: Follow up on 11/30/2022    Instructions: Appt 3:00 pm    Carlo Pascal MD   Specialty: Pulmonary Disease, Critical Care Medicine    [address]  [facility] [address]   Phone: [phone]       Next Steps: Follow up          Rossana Carmichael LMSW  [facility]   [id number]

## 2022-11-21 NOTE — PROGRESS NOTES
DISCHARGE NOTE    Pt discharged home with family. Transport to  pt at 1620. IV removed. Tele monitor removed. AVS given & explained to pt. Addressed all questions/concerns. Pt stable.    Evelina Rosas

## 2022-11-22 NOTE — DISCHARGE SUMMARY
[facility] - Intensive Care ([place] [address])  [facility] Medicine  Discharge Summary      Patient Name: Baltazar Mccray  MRN: [id number]  THEODORE: [id number]  Patient Class: IP- Inpatient  Admission Date: 11/18/2022  Hospital Length of Stay: 3 days  Discharge Date and Time: 11/21/2022  6:26 PM  Attending Physician: Carina att. providers found   Discharging Provider: Libby Holt PA-C  Primary Care Provider: [facility] Medicine Team: [facility] Libby Holt PA-C  Primary Care Team: [facility]    HPI:   Baltazar Mccray is a 62yM with h/o COPD on  2LNC home O2, HTN, HFpEF, seizure disorder admitted to hospital medicine for management of acute COPD exacerbation. Patient reports gradually worsening shortness of breath that started 2 days ago. Endorses associated dry cough, decreased PO intake and nausea with SOB onset. Denies alleviating or aggravating factors. Used home MDI, nebulizer treatments and nightly BiPAP without improvement of symptoms. States symptoms are similar to prior COPD flares. Denies fever, chills, vomiting, abdominal pain, diarrhea, chest pain, extremity numbness or weakness.       In ED: T 98.3 F, , R 22, /96 and SpO2 97% on NRB mask. CBC without leukocytosis, CMP with mild hypokalemia and metabolic alkalosis. CXR with findings suggestive of COPD/emphysema.  There is a thin linear opacity in the right mid lung zone suggestive of atelectasis and/or scarring, unchanged.  No definite new confluent airspace consolidation, substantial volume of pleural fluid or pneumothorax. EKG with sinus tachycardia. Trop elevated to 0.034, similar to baseline. Flu and COVID swabs negative.  He received 1 DuoNeb and 125 mg of Solu-Medrol on route with EMS. Received duonebs x 3 + steroids with minimal improvement. Placed on continuous albuterol x 1 hour, persistent tachypnea  - admitted to hospital medicine for further management.      * No surgery found *      Hospital Course:    Baltazar Mccray was admitted to hospital medicine for management of acute COPD exacerbation. Acidotic and hypercapnic on ABG requiring several hours on BiPAP to improve pH. Treated with IV magnesium, scheduled duonebs, IV steroids that were transitioned to PO and nightly BiPAP. Oxygen requirement improved but still with work of breathing and desaturations on exertion. Notable for increased wheezing even with increased work of breathing, replaced PO steroids with IV and increased frequency of scheduled duonebs. TTE ordered for non-improving orthopnea and persistent crackles on exam, unchanged from prior. After treatment patient appears to return to baseline, O2 saturations >95% on normal home 2L supplemental oxygen. Lung exam improved on auscultation.    On day of discharge patient's vital signs were stable and patient appeared clinically ready for discharge. Discussed hospital course, discharge plan and return precautions - he expressed understanding. All questions answered at that time.       Goals of Care Treatment Preferences:  Code Status: Full Code      Consults:     * COPD exacerbation  Resolved   BiPAP (biphasic positive airway pressure) dependence  Patient with worsening shortness of breath and work of breathing for the past 2 days. Not improved with home MDI, duo nebs or BiPAP. Hypoxic to 93% on 2 L NC (home O2 is 2L) with continued increase work of breathing and shortness of breath.     - reports increased sputum production, increased dyspnea. Tachycardic to 120s and tachypenic to RR 26 on admission  - does require 2L supplemental oxygen at baseline  - pulse oximetry 91% on room air/ 94% on 2L nasal cannula on admission [goal 88%-92%]  - CXR demonstrated suggestive of COPD/emphysema. No definite new confluent airspace consolidation, substantial volume of pleural fluid or pneumothorax.   - procalcitonin <0.02  - Afebrile without leukocytosis  - Arterial Blood Gas result:  pO2 40; pCO2 52; pH 7.311;  HCO3  35.5, %O2 Sat 67 on admission   - repeat ABG after BiPAP improved to pO2 106; pCO2 70.3; pH 7.400;  HCO3 32.4, %O2 Sat 67  - started IV methylprednisolone 80 mg q6h   - transitioned and discharged on oral prednisone course   - scheduled duo nebs ordered, mucinex or airway clearance techniques if needed   - returned to respiratory baseline prior to discharge.  - advised to follow up with PCP and pulmonology outpatient        Final Active Diagnoses:    Diagnosis Date Noted POA    PRINCIPAL PROBLEM:  COPD exacerbation [J44.1] 05/14/2017 Yes    Acute on chronic respiratory failure with hypoxia and hypercapnia [J96.21, J96.22] 01/29/2020 Yes    (HFpEF) heart failure with preserved ejection fraction [I50.30] 09/11/2022 Yes    Hypophosphatemia [E83.39] 11/19/2022 Yes    Hypokalemia [E87.6] 10/19/2022 Yes    BiPAP (biphasic positive airway pressure) dependence [Z99.89]  Not Applicable    Atrial fibrillation [I48.91] 03/05/2022 Yes    Alcohol use disorder, mild, abuse [F10.10]  Yes     Chronic    Cigarette nicotine dependence without complication [F17.210] 04/27/2021 Yes    Elevated troponin [R77.8] 08/12/2018 Yes    Essential hypertension [I10] 05/19/2017 Yes     Chronic      Problems Resolved During this Admission:       Discharged Condition: good    Disposition: Home or Self Care    Follow Up:   Follow-up Information     [facility] Follow up on 11/30/2022.    Why: Appt 3:00 pm  Contact information:  [address]  [facility] [address]  [phone]             Carlo Pascal MD Follow up.    Specialties: Pulmonary Disease, Critical Care Medicine  Contact information:  [address]  [facility] [address]  [phone]                       Patient Instructions:      Diet Cardiac     Activity as tolerated       Significant Diagnostic Studies:    Lab Results   Component Value Date    WBC 10.85 11/21/2022    HGB 12.3 (L) 11/21/2022    HCT 40.2 11/21/2022    MCV 82 11/21/2022      11/21/2022       CMP  Sodium   Date Value Ref Range Status   11/21/2022 134 (L) 136 - 145 mmol/L Final     Potassium   Date Value Ref Range Status   11/21/2022 3.9 3.5 - 5.1 mmol/L Final     Chloride   Date Value Ref Range Status   11/21/2022 91 (L) 95 - 110 mmol/L Final     CO2   Date Value Ref Range Status   11/21/2022 35 (H) 23 - 29 mmol/L Final     Glucose   Date Value Ref Range Status   11/21/2022 103 70 - 110 mg/dL Final     BUN   Date Value Ref Range Status   11/21/2022 18 6 - 20 mg/dL Final     Creatinine   Date Value Ref Range Status   11/21/2022 0.9 0.5 - 1.4 mg/dL Final     Calcium   Date Value Ref Range Status   11/21/2022 9.2 8.7 - 10.5 mg/dL Final     Total Protein   Date Value Ref Range Status   11/18/2022 7.2 6.0 - 8.4 g/dL Final     Albumin   Date Value Ref Range Status   11/18/2022 3.3 (L) 3.5 - 5.2 g/dL Final     Total Bilirubin   Date Value Ref Range Status   11/18/2022 0.4 0.1 - 1.0 mg/dL Final     Comment:     For infants and newborns, interpretation of results should be based  on gestational age, weight and in agreement with clinical  observations.    Premature Infant recommended reference ranges:  Up to 24 hours.............<8.0 mg/dL  Up to 48 hours............<12.0 mg/dL  3-5 days..................<15.0 mg/dL  6-29 days.................<15.0 mg/dL       Alkaline Phosphatase   Date Value Ref Range Status   11/18/2022 83 55 - 135 U/L Final     AST   Date Value Ref Range Status   11/18/2022 34 10 - 40 U/L Final     ALT   Date Value Ref Range Status   11/18/2022 25 10 - 44 U/L Final     Anion Gap   Date Value Ref Range Status   11/21/2022 8 8 - 16 mmol/L Final     eGFR   Date Value Ref Range Status   11/21/2022 >60.0 >60 mL/min/1.73 m^2 Final     Recent Labs   Lab 11/19/22  0712   TROPONINI 0.027*     Imaging Results          X-Ray Chest AP Portable (Final result)  Result time 11/18/22 05:53:08    Final result by Mary Bermudez MD (11/18/22 05:53:08)                 Impression:       Please see above.      Electronically signed by: Mary Bermudez MD  Date:    11/18/2022  Time:    05:53             Narrative:    EXAMINATION:  XR CHEST AP PORTABLE    CLINICAL HISTORY:  Shortness of breath    TECHNIQUE:  Single frontal view of the chest was performed.    COMPARISON:  Cardiac monitoring leads overlie the chest.    FINDINGS:  Cardiac monitoring leads overlie the chest.  The cardiomediastinal silhouette is unchanged in size and configuration noting atherosclerosis of the thoracic aorta.  Mediastinal structures are midline.  The lungs are hyperexpanded with diffuse coarse interstitial attenuation similar to prior exams.  Findings suggestive of COPD/emphysema.  There is a thin linear opacity in the right mid lung zone suggestive of atelectasis and/or scarring, unchanged.  No definite new confluent airspace consolidation, substantial volume of pleural fluid or pneumothorax.  Previously identified pulmonary nodule seen on CTA chest 08/11/2022 are not as well delineated on this single radiographic view of the chest.  Osseous structures are intact.                              Results for orders placed during the hospital encounter of 11/18/22    Echo    Interpretation Summary  · The left ventricle is normal in size with normal systolic function.  · The estimated ejection fraction is 53%.  · There are segmental left ventricular wall motion abnormalities.  · Normal left ventricular diastolic function.  · There is abnormal septal wall motion.  · Normal right ventricular size with normal right ventricular systolic function.  · Normal central venous pressure (3 mmHg).  · There is a small right pleural effusion.      Pending Diagnostic Studies:     None         Medications:  Reconciled Home Medications:      Medication List      START taking these medications    predniSONE 20 MG tablet  Commonly known as: DELTASONE  Take 2 tablets (40 mg total) by mouth once daily. for 5 days        CONTINUE taking these  medications    albuterol 90 mcg/actuation inhaler  Commonly known as: PROVENTIL/VENTOLIN HFA  Inhale 1-2 puffs into the lungs every 6 (six) hours as needed for Wheezing. Rescue     albuterol-ipratropium 2.5 mg-0.5 mg/3 mL nebulizer solution  Commonly known as: DUO-NEB  Use 1 vial (3 mLs) by nebulization every 4 (four) hours as needed for Wheezing or Shortness of Breath. Rescue     bismuth subsalicylate 262 mg/15 mL suspension  Commonly known as: PEPTO BISMOL  Take 30 mLs by mouth daily as needed for Indigestion.     BREZTRI AEROSPHERE 160-9-4.8 mcg/actuation Hfaa  Generic drug: budesonide-glycopyr-formoterol  Inhale 2 puffs into the lungs 2 (two) times daily.     ferrous sulfate 325 (65 FE) MG EC tablet  Take 1 tablet (325 mg total) by mouth once daily.     folic acid 1 MG tablet  Commonly known as: FOLVITE  Take 1 tablet (1 mg total) by mouth once daily.     furosemide 20 MG tablet  Commonly known as: LASIX  Take 2 tablets (40 mg total) by mouth once daily.     nicotine 7 mg/24 hr  Commonly known as: NICODERM CQ  Place 1 patch onto the skin once daily.     NIFEdipine 30 MG (OSM) 24 hr tablet  Commonly known as: PROCARDIA-XL  Take 1 tablet (30 mg total) by mouth once daily.     ONCOVITE tablet  Generic drug: multivitamin  Take 1 tablet by mouth once daily.     thiamine 100 MG tablet  Take 1 tablet (100 mg total) by mouth once daily.     TUMS ORAL  Take 2 tablets by mouth daily as needed (Heartburn).            Indwelling Lines/Drains at time of discharge:   Lines/Drains/Airways     None                 Time spent on the discharge of patient: 36 minutes         Libby Holt PA-C  Department of Hospital Medicine  [facility] - Intensive Care ([place]

## 2022-11-22 NOTE — ASSESSMENT & PLAN NOTE
Resolved   BiPAP (biphasic positive airway pressure) dependence  Patient with worsening shortness of breath and work of breathing for the past 2 days. Not improved with home MDI, duo nebs or BiPAP. Hypoxic to 93% on 2 L NC (home O2 is 2L) with continued increase work of breathing and shortness of breath.     - reports increased sputum production, increased dyspnea. Tachycardic to 120s and tachypenic to RR 26 on admission  - does require 2L supplemental oxygen at baseline  - pulse oximetry 91% on room air/ 94% on 2L nasal cannula on admission [goal 88%-92%]  - CXR demonstrated suggestive of COPD/emphysema. No definite new confluent airspace consolidation, substantial volume of pleural fluid or pneumothorax.   - procalcitonin <0.02  - Afebrile without leukocytosis  - Arterial Blood Gas result:  pO2 40; pCO2 52; pH 7.311;  HCO3 35.5, %O2 Sat 67 on admission   - repeat ABG after BiPAP improved to pO2 106; pCO2 70.3; pH 7.400;  HCO3 32.4, %O2 Sat 67  - started IV methylprednisolone 80 mg q6h   - transitioned and discharged on oral prednisone course   - scheduled duo nebs ordered, mucinex or airway clearance techniques if needed   - returned to respiratory baseline prior to discharge.  - advised to follow up with PCP and pulmonology outpatient

## 2022-11-23 ENCOUNTER — CARE AT HOME (OUTPATIENT)
Dept: HOME HEALTH SERVICES | Facility: CLINIC | Age: 62
End: 2022-11-23
Payer: MEDICARE

## 2022-11-23 ENCOUNTER — PATIENT OUTREACH (OUTPATIENT)
Dept: ADMINISTRATIVE | Facility: CLINIC | Age: 62
End: 2022-11-23
Payer: MEDICARE

## 2022-11-23 DIAGNOSIS — I50.30 HEART FAILURE WITH PRESERVED EJECTION FRACTION, UNSPECIFIED HF CHRONICITY: Primary | ICD-10-CM

## 2022-11-23 PROCEDURE — 99349 HOME/RES VST EST MOD MDM 40: CPT | Mod: S$GLB,,, | Performed by: NURSE PRACTITIONER

## 2022-11-23 PROCEDURE — 99349 PR HOME VISIT,ESTAB PATIENT,LEVEL III: ICD-10-PCS | Mod: S$GLB,,, | Performed by: NURSE PRACTITIONER

## 2022-11-23 NOTE — PROGRESS NOTES
C3 nurse attempted to contact Baltazar Mccray  for a TCC post hospital discharge follow up call. No answer. Left voicemail with callback information. The patient has a scheduled HOSFU appointment with Jaden on 11/30/2022 @ 1678.

## 2022-11-23 NOTE — PROGRESS NOTES
C3 nurse attempted to contact Baltazar Mccray  for a TCC post hospital discharge follow up call. No answer. Left voicemail with callback information. The patient has a scheduled HOSFU appointment with Jaden on 11/30/2022 @ 9659.

## 2022-11-28 ENCOUNTER — NURSE TRIAGE (OUTPATIENT)
Dept: ADMINISTRATIVE | Facility: CLINIC | Age: 62
End: 2022-11-28
Payer: MEDICARE

## 2022-11-28 NOTE — PROGRESS NOTES
TCC call not conducted, pt sister have not spoke with since his admit and not knowledgeable of his condition.

## 2022-11-28 NOTE — TELEPHONE ENCOUNTER
Pt sister states someone called to go over pt medications for discharge, pt currently in hospital. Sister states the line was disconnected. Unable to confirm who was calling per pt chart. Advised sister to callback if she doesn't hear from anyone before discharge.     Reason for Disposition   Nursing judgment    Protocols used: Information Only Call - No Triage-A-[place]

## 2022-12-05 ENCOUNTER — HOSPITAL ENCOUNTER (INPATIENT)
Facility: HOSPITAL | Age: 62
LOS: 4 days | Discharge: HOME | DRG: 189 | End: 2022-12-09
Attending: STUDENT IN AN ORGANIZED HEALTH CARE EDUCATION/TRAINING PROGRAM | Admitting: INTERNAL MEDICINE
Payer: MEDICARE

## 2022-12-05 DIAGNOSIS — I48.91 ATRIAL FIBRILLATION, UNSPECIFIED TYPE: Primary | ICD-10-CM

## 2022-12-05 DIAGNOSIS — J44.1 COPD EXACERBATION: ICD-10-CM

## 2022-12-05 DIAGNOSIS — R00.0 TACHYCARDIA: ICD-10-CM

## 2022-12-05 DIAGNOSIS — I48.91 A-FIB: ICD-10-CM

## 2022-12-05 DIAGNOSIS — R06.02 SHORTNESS OF BREATH: ICD-10-CM

## 2022-12-05 DIAGNOSIS — I48.91 AF (ATRIAL FIBRILLATION): ICD-10-CM

## 2022-12-05 DIAGNOSIS — R79.89 ELEVATED TROPONIN: ICD-10-CM

## 2022-12-05 DIAGNOSIS — R00.1 BRADYCARDIA: ICD-10-CM

## 2022-12-05 PROBLEM — I48.19 PERSISTENT ATRIAL FIBRILLATION: Status: ACTIVE | Noted: 2022-12-05

## 2022-12-05 LAB
ALBUMIN SERPL BCP-MCNC: 3 G/DL (ref 3.5–5.2)
ALLENS TEST: ABNORMAL
ALP SERPL-CCNC: 68 U/L (ref 55–135)
ALT SERPL W/O P-5'-P-CCNC: 16 U/L (ref 10–44)
ANION GAP SERPL CALC-SCNC: 9 MMOL/L (ref 8–16)
AST SERPL-CCNC: 20 U/L (ref 10–40)
BASOPHILS # BLD AUTO: 0.07 K/UL (ref 0–0.2)
BASOPHILS NFR BLD: 0.9 % (ref 0–1.9)
BILIRUB SERPL-MCNC: 0.5 MG/DL (ref 0.1–1)
BNP SERPL-MCNC: 623 PG/ML (ref 0–99)
BUN SERPL-MCNC: 7 MG/DL (ref 6–20)
CALCIUM SERPL-MCNC: 9.8 MG/DL (ref 8.7–10.5)
CHLORIDE SERPL-SCNC: 97 MMOL/L (ref 95–110)
CO2 SERPL-SCNC: 33 MMOL/L (ref 23–29)
CREAT SERPL-MCNC: 0.9 MG/DL (ref 0.5–1.4)
DELSYS: ABNORMAL
DIFFERENTIAL METHOD: ABNORMAL
EOSINOPHIL # BLD AUTO: 0.1 K/UL (ref 0–0.5)
EOSINOPHIL NFR BLD: 1.3 % (ref 0–8)
ERYTHROCYTE [DISTWIDTH] IN BLOOD BY AUTOMATED COUNT: 18 % (ref 11.5–14.5)
EST. GFR  (NO RACE VARIABLE): >60 ML/MIN/1.73 M^2
GLUCOSE SERPL-MCNC: 152 MG/DL (ref 70–110)
HCO3 UR-SCNC: 38.6 MMOL/L (ref 24–28)
HCT VFR BLD AUTO: 42.2 % (ref 40–54)
HGB BLD-MCNC: 13.2 G/DL (ref 14–18)
IMM GRANULOCYTES # BLD AUTO: 0.02 K/UL (ref 0–0.04)
IMM GRANULOCYTES NFR BLD AUTO: 0.2 % (ref 0–0.5)
INFLUENZA A, MOLECULAR: NOT DETECTED
INFLUENZA B, MOLECULAR: NOT DETECTED
LYMPHOCYTES # BLD AUTO: 2.2 K/UL (ref 1–4.8)
LYMPHOCYTES NFR BLD: 26.4 % (ref 18–48)
MCH RBC QN AUTO: 25.1 PG (ref 27–31)
MCHC RBC AUTO-ENTMCNC: 31.3 G/DL (ref 32–36)
MCV RBC AUTO: 80 FL (ref 82–98)
MODE: ABNORMAL
MONOCYTES # BLD AUTO: 0.8 K/UL (ref 0.3–1)
MONOCYTES NFR BLD: 9.7 % (ref 4–15)
NEUTROPHILS # BLD AUTO: 5.1 K/UL (ref 1.8–7.7)
NEUTROPHILS NFR BLD: 61.5 % (ref 38–73)
NRBC BLD-RTO: 0 /100 WBC
PCO2 BLDA: 66.2 MMHG (ref 35–45)
PH SMN: 7.37 [PH] (ref 7.35–7.45)
PLATELET # BLD AUTO: 288 K/UL (ref 150–450)
PMV BLD AUTO: 9.2 FL (ref 9.2–12.9)
PO2 BLDA: 24 MMHG (ref 40–60)
POC BE: 13 MMOL/L
POC SATURATED O2: 38 % (ref 95–100)
POC TCO2: 41 MMOL/L (ref 24–29)
POCT GLUCOSE: 159 MG/DL (ref 70–110)
POTASSIUM SERPL-SCNC: 4.5 MMOL/L (ref 3.5–5.1)
PROT SERPL-MCNC: 7.1 G/DL (ref 6–8.4)
RBC # BLD AUTO: 5.25 M/UL (ref 4.6–6.2)
RSV AG BY MOLECULAR METHOD: NOT DETECTED
SAMPLE: ABNORMAL
SARS-COV-2 RNA RESP QL NAA+PROBE: NOT DETECTED
SITE: ABNORMAL
SODIUM SERPL-SCNC: 139 MMOL/L (ref 136–145)
TROPONIN I SERPL DL<=0.01 NG/ML-MCNC: 0.13 NG/ML (ref 0–0.03)
TROPONIN I SERPL DL<=0.01 NG/ML-MCNC: 0.16 NG/ML (ref 0–0.03)
TSH SERPL DL<=0.005 MIU/L-ACNC: 1.14 UIU/ML (ref 0.4–4)
WBC # BLD AUTO: 8.23 K/UL (ref 3.9–12.7)

## 2022-12-05 PROCEDURE — 84484 ASSAY OF TROPONIN QUANT: CPT | Performed by: INTERNAL MEDICINE

## 2022-12-05 PROCEDURE — 99285 EMERGENCY DEPT VISIT HI MDM: CPT | Mod: 25

## 2022-12-05 PROCEDURE — 27000221 HC OXYGEN, UP TO 24 HOURS

## 2022-12-05 PROCEDURE — 99900035 HC TECH TIME PER 15 MIN (STAT)

## 2022-12-05 PROCEDURE — 94640 AIRWAY INHALATION TREATMENT: CPT

## 2022-12-05 PROCEDURE — 93005 ELECTROCARDIOGRAM TRACING: CPT

## 2022-12-05 PROCEDURE — 84443 ASSAY THYROID STIM HORMONE: CPT | Performed by: INTERNAL MEDICINE

## 2022-12-05 PROCEDURE — 83880 ASSAY OF NATRIURETIC PEPTIDE: CPT | Performed by: INTERNAL MEDICINE

## 2022-12-05 PROCEDURE — 27000190 HC CPAP FULL FACE MASK W/VALVE

## 2022-12-05 PROCEDURE — 63600175 PHARM REV CODE 636 W HCPCS: Performed by: INTERNAL MEDICINE

## 2022-12-05 PROCEDURE — 94660 CPAP INITIATION&MGMT: CPT

## 2022-12-05 PROCEDURE — 0241U SARS-COV2 (COVID) WITH FLU/RSV BY PCR: CPT | Performed by: INTERNAL MEDICINE

## 2022-12-05 PROCEDURE — 99291 CRITICAL CARE FIRST HOUR: CPT | Mod: CS,,, | Performed by: STUDENT IN AN ORGANIZED HEALTH CARE EDUCATION/TRAINING PROGRAM

## 2022-12-05 PROCEDURE — 99291 PR CRITICAL CARE, E/M 30-74 MINUTES: ICD-10-PCS | Mod: CS,,, | Performed by: STUDENT IN AN ORGANIZED HEALTH CARE EDUCATION/TRAINING PROGRAM

## 2022-12-05 PROCEDURE — 82962 GLUCOSE BLOOD TEST: CPT

## 2022-12-05 PROCEDURE — 85025 COMPLETE CBC W/AUTO DIFF WBC: CPT | Performed by: INTERNAL MEDICINE

## 2022-12-05 PROCEDURE — 94640 AIRWAY INHALATION TREATMENT: CPT | Mod: XB

## 2022-12-05 PROCEDURE — 84484 ASSAY OF TROPONIN QUANT: CPT | Mod: 91 | Performed by: STUDENT IN AN ORGANIZED HEALTH CARE EDUCATION/TRAINING PROGRAM

## 2022-12-05 PROCEDURE — 80053 COMPREHEN METABOLIC PANEL: CPT | Performed by: INTERNAL MEDICINE

## 2022-12-05 PROCEDURE — 94761 N-INVAS EAR/PLS OXIMETRY MLT: CPT

## 2022-12-05 PROCEDURE — 93010 ELECTROCARDIOGRAM REPORT: CPT | Mod: 76,,, | Performed by: INTERNAL MEDICINE

## 2022-12-05 PROCEDURE — 93010 EKG 12-LEAD: ICD-10-PCS | Mod: 76,,, | Performed by: INTERNAL MEDICINE

## 2022-12-05 PROCEDURE — 25000003 PHARM REV CODE 250

## 2022-12-05 PROCEDURE — 82803 BLOOD GASES ANY COMBINATION: CPT

## 2022-12-05 PROCEDURE — 12000002 HC ACUTE/MED SURGE SEMI-PRIVATE ROOM

## 2022-12-05 PROCEDURE — 25000242 PHARM REV CODE 250 ALT 637 W/ HCPCS: Performed by: INTERNAL MEDICINE

## 2022-12-05 PROCEDURE — 96374 THER/PROPH/DIAG INJ IV PUSH: CPT

## 2022-12-05 PROCEDURE — 25000003 PHARM REV CODE 250: Performed by: INTERNAL MEDICINE

## 2022-12-05 RX ORDER — ENOXAPARIN SODIUM 100 MG/ML
40 INJECTION SUBCUTANEOUS EVERY 24 HOURS
Status: DISCONTINUED | OUTPATIENT
Start: 2022-12-05 | End: 2022-12-06

## 2022-12-05 RX ORDER — DILTIAZEM HCL 1 MG/ML
5 INJECTION, SOLUTION INTRAVENOUS
Status: COMPLETED | OUTPATIENT
Start: 2022-12-05 | End: 2022-12-05

## 2022-12-05 RX ORDER — DILTIAZEM HYDROCHLORIDE 5 MG/ML
20 INJECTION INTRAVENOUS
Status: COMPLETED | OUTPATIENT
Start: 2022-12-05 | End: 2022-12-05

## 2022-12-05 RX ORDER — THIAMINE HCL 100 MG
100 TABLET ORAL DAILY
Status: DISCONTINUED | OUTPATIENT
Start: 2022-12-06 | End: 2022-12-09 | Stop reason: HOSPADM

## 2022-12-05 RX ORDER — FOLIC ACID 1 MG/1
1 TABLET ORAL DAILY
Status: DISCONTINUED | OUTPATIENT
Start: 2022-12-06 | End: 2022-12-09 | Stop reason: HOSPADM

## 2022-12-05 RX ORDER — SODIUM CHLORIDE 0.9 % (FLUSH) 0.9 %
10 SYRINGE (ML) INJECTION
Status: DISCONTINUED | OUTPATIENT
Start: 2022-12-05 | End: 2022-12-09 | Stop reason: HOSPADM

## 2022-12-05 RX ORDER — METOPROLOL TARTRATE 25 MG/1
25 TABLET, FILM COATED ORAL 4 TIMES DAILY
Status: DISCONTINUED | OUTPATIENT
Start: 2022-12-05 | End: 2022-12-05

## 2022-12-05 RX ORDER — CALCIUM CARBONATE 200(500)MG
500 TABLET,CHEWABLE ORAL DAILY PRN
Status: DISCONTINUED | OUTPATIENT
Start: 2022-12-05 | End: 2022-12-09 | Stop reason: HOSPADM

## 2022-12-05 RX ORDER — METHYLPREDNISOLONE SOD SUCC 125 MG
125 VIAL (EA) INJECTION
Status: COMPLETED | OUTPATIENT
Start: 2022-12-05 | End: 2022-12-05

## 2022-12-05 RX ORDER — TALC
6 POWDER (GRAM) TOPICAL NIGHTLY PRN
Status: DISCONTINUED | OUTPATIENT
Start: 2022-12-05 | End: 2022-12-09 | Stop reason: HOSPADM

## 2022-12-05 RX ORDER — DILTIAZEM HCL 1 MG/ML
5 INJECTION, SOLUTION INTRAVENOUS
Status: DISCONTINUED | OUTPATIENT
Start: 2022-12-05 | End: 2022-12-05

## 2022-12-05 RX ORDER — DILTIAZEM HYDROCHLORIDE 30 MG/1
30 TABLET, FILM COATED ORAL
Status: COMPLETED | OUTPATIENT
Start: 2022-12-05 | End: 2022-12-05

## 2022-12-05 RX ORDER — ONDANSETRON 2 MG/ML
4 INJECTION INTRAMUSCULAR; INTRAVENOUS EVERY 8 HOURS PRN
Status: DISCONTINUED | OUTPATIENT
Start: 2022-12-05 | End: 2022-12-09 | Stop reason: HOSPADM

## 2022-12-05 RX ORDER — LEVALBUTEROL INHALATION SOLUTION 0.63 MG/3ML
0.63 SOLUTION RESPIRATORY (INHALATION) EVERY 8 HOURS
Status: DISCONTINUED | OUTPATIENT
Start: 2022-12-06 | End: 2022-12-09

## 2022-12-05 RX ORDER — ACETAMINOPHEN 325 MG/1
650 TABLET ORAL EVERY 4 HOURS PRN
Status: DISCONTINUED | OUTPATIENT
Start: 2022-12-05 | End: 2022-12-09 | Stop reason: HOSPADM

## 2022-12-05 RX ORDER — IPRATROPIUM BROMIDE AND ALBUTEROL SULFATE 2.5; .5 MG/3ML; MG/3ML
3 SOLUTION RESPIRATORY (INHALATION)
Status: COMPLETED | OUTPATIENT
Start: 2022-12-05 | End: 2022-12-05

## 2022-12-05 RX ORDER — HYDROCODONE BITARTRATE AND ACETAMINOPHEN 5; 325 MG/1; MG/1
1 TABLET ORAL EVERY 4 HOURS PRN
Status: DISCONTINUED | OUTPATIENT
Start: 2022-12-05 | End: 2022-12-09 | Stop reason: HOSPADM

## 2022-12-05 RX ORDER — FLUTICASONE FUROATE AND VILANTEROL 100; 25 UG/1; UG/1
1 POWDER RESPIRATORY (INHALATION) DAILY
Status: DISCONTINUED | OUTPATIENT
Start: 2022-12-06 | End: 2022-12-09 | Stop reason: HOSPADM

## 2022-12-05 RX ORDER — DILTIAZEM HCL 1 MG/ML
5 INJECTION, SOLUTION INTRAVENOUS CONTINUOUS
Status: DISCONTINUED | OUTPATIENT
Start: 2022-12-06 | End: 2022-12-06

## 2022-12-05 RX ORDER — DILTIAZEM HYDROCHLORIDE 5 MG/ML
INJECTION INTRAVENOUS
Status: COMPLETED
Start: 2022-12-05 | End: 2022-12-05

## 2022-12-05 RX ORDER — PREDNISONE 20 MG/1
40 TABLET ORAL DAILY
Status: DISCONTINUED | OUTPATIENT
Start: 2022-12-06 | End: 2022-12-09 | Stop reason: HOSPADM

## 2022-12-05 RX ADMIN — LEVALBUTEROL HYDROCHLORIDE 0.63 MG: 0.63 SOLUTION RESPIRATORY (INHALATION) at 11:12

## 2022-12-05 RX ADMIN — DILTIAZEM HYDROCHLORIDE 5 MG/HR: 5 INJECTION INTRAVENOUS at 11:12

## 2022-12-05 RX ADMIN — IPRATROPIUM BROMIDE AND ALBUTEROL SULFATE 3 ML: 2.5; .5 SOLUTION RESPIRATORY (INHALATION) at 08:12

## 2022-12-05 RX ADMIN — DILTIAZEM HYDROCHLORIDE 20 MG: 5 INJECTION INTRAVENOUS at 08:12

## 2022-12-05 RX ADMIN — DILTIAZEM HYDROCHLORIDE 30 MG: 30 TABLET, FILM COATED ORAL at 08:12

## 2022-12-05 RX ADMIN — METHYLPREDNISOLONE SODIUM SUCCINATE 125 MG: 125 INJECTION, POWDER, FOR SOLUTION INTRAMUSCULAR; INTRAVENOUS at 08:12

## 2022-12-06 LAB
ALBUMIN SERPL BCP-MCNC: 2.9 G/DL (ref 3.5–5.2)
ALP SERPL-CCNC: 64 U/L (ref 55–135)
ALT SERPL W/O P-5'-P-CCNC: 15 U/L (ref 10–44)
ANION GAP SERPL CALC-SCNC: 12 MMOL/L (ref 8–16)
APTT BLDCRRT: 28.2 SEC (ref 21–32)
APTT BLDCRRT: 28.5 SEC (ref 21–32)
APTT BLDCRRT: 29.3 SEC (ref 21–32)
AST SERPL-CCNC: 17 U/L (ref 10–40)
BASOPHILS # BLD AUTO: 0.01 K/UL (ref 0–0.2)
BASOPHILS NFR BLD: 0.2 % (ref 0–1.9)
BILIRUB SERPL-MCNC: 0.6 MG/DL (ref 0.1–1)
BUN SERPL-MCNC: 9 MG/DL (ref 6–20)
CALCIUM SERPL-MCNC: 9.4 MG/DL (ref 8.7–10.5)
CHLORIDE SERPL-SCNC: 98 MMOL/L (ref 95–110)
CO2 SERPL-SCNC: 26 MMOL/L (ref 23–29)
CREAT SERPL-MCNC: 0.9 MG/DL (ref 0.5–1.4)
DIFFERENTIAL METHOD: ABNORMAL
EOSINOPHIL # BLD AUTO: 0 K/UL (ref 0–0.5)
EOSINOPHIL NFR BLD: 0 % (ref 0–8)
ERYTHROCYTE [DISTWIDTH] IN BLOOD BY AUTOMATED COUNT: 17.7 % (ref 11.5–14.5)
EST. GFR  (NO RACE VARIABLE): >60 ML/MIN/1.73 M^2
GLUCOSE SERPL-MCNC: 175 MG/DL (ref 70–110)
HCT VFR BLD AUTO: 39.6 % (ref 40–54)
HGB BLD-MCNC: 12.3 G/DL (ref 14–18)
IMM GRANULOCYTES # BLD AUTO: 0.02 K/UL (ref 0–0.04)
IMM GRANULOCYTES NFR BLD AUTO: 0.3 % (ref 0–0.5)
INR PPP: 1 (ref 0.8–1.2)
LYMPHOCYTES # BLD AUTO: 0.4 K/UL (ref 1–4.8)
LYMPHOCYTES NFR BLD: 5.4 % (ref 18–48)
MAGNESIUM SERPL-MCNC: 1.7 MG/DL (ref 1.6–2.6)
MCH RBC QN AUTO: 24.6 PG (ref 27–31)
MCHC RBC AUTO-ENTMCNC: 31.1 G/DL (ref 32–36)
MCV RBC AUTO: 79 FL (ref 82–98)
MONOCYTES # BLD AUTO: 0.1 K/UL (ref 0.3–1)
MONOCYTES NFR BLD: 1.2 % (ref 4–15)
NEUTROPHILS # BLD AUTO: 6 K/UL (ref 1.8–7.7)
NEUTROPHILS NFR BLD: 92.9 % (ref 38–73)
NRBC BLD-RTO: 0 /100 WBC
PHOSPHATE SERPL-MCNC: 3.6 MG/DL (ref 2.7–4.5)
PLATELET # BLD AUTO: 303 K/UL (ref 150–450)
PMV BLD AUTO: 9.7 FL (ref 9.2–12.9)
POCT GLUCOSE: 107 MG/DL (ref 70–110)
POTASSIUM SERPL-SCNC: 4.2 MMOL/L (ref 3.5–5.1)
PROCALCITONIN SERPL IA-MCNC: 0.13 NG/ML
PROT SERPL-MCNC: 6.9 G/DL (ref 6–8.4)
PROTHROMBIN TIME: 10.3 SEC (ref 9–12.5)
RBC # BLD AUTO: 5 M/UL (ref 4.6–6.2)
SODIUM SERPL-SCNC: 136 MMOL/L (ref 136–145)
TROPONIN I SERPL DL<=0.01 NG/ML-MCNC: 0.09 NG/ML (ref 0–0.03)
WBC # BLD AUTO: 6.45 K/UL (ref 3.9–12.7)

## 2022-12-06 PROCEDURE — 85730 THROMBOPLASTIN TIME PARTIAL: CPT | Mod: 91 | Performed by: INTERNAL MEDICINE

## 2022-12-06 PROCEDURE — 25000003 PHARM REV CODE 250: Performed by: STUDENT IN AN ORGANIZED HEALTH CARE EDUCATION/TRAINING PROGRAM

## 2022-12-06 PROCEDURE — 84145 PROCALCITONIN (PCT): CPT | Performed by: STUDENT IN AN ORGANIZED HEALTH CARE EDUCATION/TRAINING PROGRAM

## 2022-12-06 PROCEDURE — 94640 AIRWAY INHALATION TREATMENT: CPT

## 2022-12-06 PROCEDURE — 94660 CPAP INITIATION&MGMT: CPT

## 2022-12-06 PROCEDURE — 83735 ASSAY OF MAGNESIUM: CPT | Performed by: STUDENT IN AN ORGANIZED HEALTH CARE EDUCATION/TRAINING PROGRAM

## 2022-12-06 PROCEDURE — 20000000 HC ICU ROOM

## 2022-12-06 PROCEDURE — 25000242 PHARM REV CODE 250 ALT 637 W/ HCPCS: Performed by: STUDENT IN AN ORGANIZED HEALTH CARE EDUCATION/TRAINING PROGRAM

## 2022-12-06 PROCEDURE — 99223 PR INITIAL HOSPITAL CARE,LEVL III: ICD-10-PCS | Mod: AI,GC,, | Performed by: INTERNAL MEDICINE

## 2022-12-06 PROCEDURE — 99900035 HC TECH TIME PER 15 MIN (STAT)

## 2022-12-06 PROCEDURE — 63600175 PHARM REV CODE 636 W HCPCS: Performed by: STUDENT IN AN ORGANIZED HEALTH CARE EDUCATION/TRAINING PROGRAM

## 2022-12-06 PROCEDURE — 84484 ASSAY OF TROPONIN QUANT: CPT | Performed by: SURGERY

## 2022-12-06 PROCEDURE — 84100 ASSAY OF PHOSPHORUS: CPT | Performed by: STUDENT IN AN ORGANIZED HEALTH CARE EDUCATION/TRAINING PROGRAM

## 2022-12-06 PROCEDURE — 80053 COMPREHEN METABOLIC PANEL: CPT | Performed by: STUDENT IN AN ORGANIZED HEALTH CARE EDUCATION/TRAINING PROGRAM

## 2022-12-06 PROCEDURE — 85025 COMPLETE CBC W/AUTO DIFF WBC: CPT | Performed by: STUDENT IN AN ORGANIZED HEALTH CARE EDUCATION/TRAINING PROGRAM

## 2022-12-06 PROCEDURE — 27000221 HC OXYGEN, UP TO 24 HOURS

## 2022-12-06 PROCEDURE — 85610 PROTHROMBIN TIME: CPT | Performed by: STUDENT IN AN ORGANIZED HEALTH CARE EDUCATION/TRAINING PROGRAM

## 2022-12-06 PROCEDURE — 85730 THROMBOPLASTIN TIME PARTIAL: CPT | Performed by: STUDENT IN AN ORGANIZED HEALTH CARE EDUCATION/TRAINING PROGRAM

## 2022-12-06 PROCEDURE — 99223 1ST HOSP IP/OBS HIGH 75: CPT | Mod: AI,GC,, | Performed by: INTERNAL MEDICINE

## 2022-12-06 PROCEDURE — 94761 N-INVAS EAR/PLS OXIMETRY MLT: CPT

## 2022-12-06 RX ORDER — LORAZEPAM 0.5 MG/1
2 TABLET ORAL EVERY 6 HOURS PRN
Status: DISCONTINUED | OUTPATIENT
Start: 2022-12-06 | End: 2022-12-09 | Stop reason: HOSPADM

## 2022-12-06 RX ORDER — DILTIAZEM HCL 1 MG/ML
5 INJECTION, SOLUTION INTRAVENOUS CONTINUOUS
Status: DISCONTINUED | OUTPATIENT
Start: 2022-12-06 | End: 2022-12-06

## 2022-12-06 RX ORDER — HEPARIN SODIUM,PORCINE/D5W 25000/250
0-40 INTRAVENOUS SOLUTION INTRAVENOUS CONTINUOUS
Status: DISCONTINUED | OUTPATIENT
Start: 2022-12-06 | End: 2022-12-06

## 2022-12-06 RX ORDER — METOPROLOL TARTRATE 25 MG/1
25 TABLET, FILM COATED ORAL 3 TIMES DAILY
Status: DISCONTINUED | OUTPATIENT
Start: 2022-12-06 | End: 2022-12-07

## 2022-12-06 RX ADMIN — HEPARIN SODIUM 12 UNITS/KG/HR: 10000 INJECTION, SOLUTION INTRAVENOUS at 02:12

## 2022-12-06 RX ADMIN — APIXABAN 5 MG: 5 TABLET, FILM COATED ORAL at 09:12

## 2022-12-06 RX ADMIN — DILTIAZEM HYDROCHLORIDE 5 MG/HR: 5 INJECTION INTRAVENOUS at 01:12

## 2022-12-06 RX ADMIN — METOPROLOL TARTRATE 25 MG: 25 TABLET, FILM COATED ORAL at 03:12

## 2022-12-06 RX ADMIN — PREDNISONE 40 MG: 20 TABLET ORAL at 08:12

## 2022-12-06 RX ADMIN — LEVALBUTEROL HYDROCHLORIDE 0.63 MG: 0.63 SOLUTION RESPIRATORY (INHALATION) at 07:12

## 2022-12-06 RX ADMIN — THERA TABS 1 TABLET: TAB at 08:12

## 2022-12-06 RX ADMIN — Medication 100 MG: at 08:12

## 2022-12-06 RX ADMIN — FOLIC ACID 1 MG: 1 TABLET ORAL at 08:12

## 2022-12-06 RX ADMIN — LEVALBUTEROL HYDROCHLORIDE 0.63 MG: 0.63 SOLUTION RESPIRATORY (INHALATION) at 03:12

## 2022-12-06 RX ADMIN — FLUTICASONE FUROATE AND VILANTEROL TRIFENATATE 1 PUFF: 100; 25 POWDER RESPIRATORY (INHALATION) at 07:12

## 2022-12-06 RX ADMIN — METOPROLOL TARTRATE 25 MG: 25 TABLET, FILM COATED ORAL at 08:12

## 2022-12-06 RX ADMIN — METOPROLOL TARTRATE 25 MG: 25 TABLET, FILM COATED ORAL at 09:12

## 2022-12-06 NOTE — ED PROVIDER NOTES
Encounter date: 7:53 PM 12/05/2022    Source of History   Patient/EMS    Chief Complaint   Pt presents with:   Shortness of Breath (Pt was called for increase SOB. HR between 120-220's. No hx of A-fib. Given 10 of dilt en route and a duoneb. )      History Of Present Illness   Baltazar Mccray is a 62 y.o. male with COPD on  2LNC home O2, HTN, HFpEF, seizure disorder  who presents to the ED with shortness of breath x3 days.  Patient states over the last 3 days he is had worsening shortness of breath.  He has been using his inhalers and has had to use oxygen.  He called EMS.  When EMS arrived they noted that he was satting 90% on his 2 L.  They also noted that he was tachycardic and had an EKG that was concerning for AFib with RVR.  They gave him 10 of IV diltiazem.  They gave him a DuoNeb and noted that his glucose was normal.  Patient states that he is not had any chest pain but does note that his breathing has been labored.  Denies any recent falls or trauma.  He does not note any recent travel.  No history of blood clots.    This is the extent to the patients complaints today here in the emergency department.    Review Of Systems   As per HPI and below:  Constitutional: No fever.   HEENT: No voice change. No sore throat .    Eyes:  No visual disturbances. No eye pain.   Respiratory:  + shortness of breath. + wheezing. + cough.   Cardio:  No chest pain. No leg swelling. No palpitations.   GI:  No abdominal pain. No nausea or vomiting. No diarrhea.   :  No blood in urine. No difficulty urinating.   MSK:  No back pain. No neck pain.   Skin: No rash.   Neurologic: No headache. No dizziness.       Review of patient's allergies indicates:   Allergen Reactions    Benazepril Swelling       No current facility-administered medications on file prior to encounter.     Current Outpatient Medications on File Prior to Encounter   Medication Sig Dispense Refill    albuterol (PROVENTIL/VENTOLIN HFA) 90 mcg/actuation inhaler Inhale  1-2 puffs into the lungs every 6 (six) hours as needed for Wheezing. Rescue 8.5 g 2    albuterol-ipratropium (DUO-NEB) 2.5 mg-0.5 mg/3 mL nebulizer solution Use 1 vial (3 mLs) by nebulization every 4 (four) hours as needed for Wheezing or Shortness of Breath. Rescue 180 mL 2    bismuth subsalicylate (PEPTO BISMOL) 262 mg/15 mL suspension Take 30 mLs by mouth daily as needed for Indigestion.      budesonide-glycopyr-formoterol (BREZTRI AEROSPHERE) 160-9-4.8 mcg/actuation HFAA Inhale 2 puffs into the lungs 2 (two) times daily. 10.7 g 11    calcium carbonate (TUMS ORAL) Take 2 tablets by mouth daily as needed (Heartburn).      ferrous sulfate 325 (65 FE) MG EC tablet Take 1 tablet (325 mg total) by mouth once daily. 30 tablet 1    folic acid (FOLVITE) 1 MG tablet Take 1 tablet (1 mg total) by mouth once daily. 30 tablet 2    furosemide (LASIX) 20 MG tablet Take 2 tablets (40 mg total) by mouth once daily. 60 tablet 1    multivitamin (THERAGRAN) tablet Take 1 tablet by mouth once daily. 30 tablet 2    nicotine (NICODERM CQ) 7 mg/24 hr Place 1 patch onto the skin once daily. 30 patch 2    NIFEdipine (PROCARDIA-XL) 30 MG (OSM) 24 hr tablet Take 1 tablet (30 mg total) by mouth once daily. 30 tablet 0    thiamine 100 MG tablet Take 1 tablet (100 mg total) by mouth once daily. 30 tablet 2    [DISCONTINUED] tiotropium bromide (SPIRIVA RESPIMAT) 2.5 mcg/actuation inhaler Inhale 2 puffs into the lungs Daily. Controller 4 g 5       Past History   As per HPI and below:  Past Medical History:   Diagnosis Date    Aspiration pneumonia 5/30/2021    Asthma     Atrial fibrillation with rapid ventricular response     CHF (congestive heart failure)     COPD (chronic obstructive pulmonary disease)     home O2 at night only    Coronary artery disease     Hypertension     Lung nodule     Pneumonia     Seizures      Past Surgical History:   Procedure Laterality Date    ESOPHAGOGASTRODUODENOSCOPY N/A 2/11/2022    Procedure: EGD  (ESOPHAGOGASTRODUODENOSCOPY);  Surgeon: Cain Ortega MD;  Location: [facility] ENDO (2ND FLR);  Service: Endoscopy;  Laterality: N/A;    ESOPHAGOGASTRODUODENOSCOPY N/A 9/15/2022    Procedure: EGD (ESOPHAGOGASTRODUODENOSCOPY);  Surgeon: Leonid Chavez MD;  Location: [facility] ENDO (2ND FLR);  Service: Endoscopy;  Laterality: N/A;  PA-50 - 2nd floor  vacc-wears 2L o2-inst mail and verbal-clears 4 hrs prior-tb  8/30 pt rescheduled; updated instructions mailed-    LEFT HEART CATHETERIZATION  4/23/2020    Procedure: Left heart cath;  Surgeon: Nic Pollack MD;  Location: [facility] CATH LAB;  Service: Cardiology;;       Social History     Socioeconomic History    Marital status: Single   Tobacco Use    Smoking status: Some Days     Packs/day: 0.25     Types: Cigarettes    Smokeless tobacco: Never    Tobacco comments:     1-2 cigarettes per day   Substance and Sexual Activity    Alcohol use: Yes     Alcohol/week: 2.0 standard drinks     Types: 2 Cans of beer per week     Comment: every night    Drug use: No    Sexual activity: Not Currently   Social History Narrative    Patient' nephew is currently living with him in his apartment. Patient's sister Viry ([phone]) helps manage patient's care.            6/3/21    Patient is no longer staying with family. Patient is currently staying at the [facility] and working on getting into their program for more supportive housing.      Social Determinants of Health     Financial Resource Strain: Low Risk     Difficulty of Paying Living Expenses: Not very hard   Food Insecurity: No Food Insecurity    Worried About Running Out of Food in the Last Year: Never true    Ran Out of Food in the Last Year: Never true   Transportation Needs: No Transportation Needs    Lack of Transportation (Medical): No    Lack of Transportation (Non-Medical): No   Physical Activity: Inactive    Days of Exercise per Week: 0 days    Minutes of Exercise per Session: 0 min   Stress: Unknown    Feeling of  Stress : Patient refused   Social Connections: Socially Isolated    Frequency of Communication with Friends and Family: More than three times a week    Frequency of Social Gatherings with Friends and Family: More than three times a week    Attends Voodoo Services: Never    Active Member of Clubs or Organizations: No    Attends Club or Organization Meetings: Never    Marital Status: Never    Housing Stability: Low Risk     Unable to Pay for Housing in the Last Year: No    Number of Places Lived in the Last Year: 2    Unstable Housing in the Last Year: No       Family History   Problem Relation Age of Onset    Cancer Father     Hypertension Sister     Stroke Sister     Stroke Brother     Diabetes Neg Hx     Heart disease Neg Hx        Physical Exam     Vitals:    12/05/22 2130 12/05/22 2131 12/05/22 2141 12/05/22 2202   BP:  95/73 112/75 103/67   Pulse: (!) 148   91   Resp:    (!) 26   Temp:       TempSrc:       SpO2:    100%   Weight:         Physical Exam:   Nursing note and vitals reviewed.  Appearance:  Nontoxic adult male in acute respiratory distress.  Making purposeful movements.  Speaking in 2-3 word full sentences.  Skin: No obvious rashes seen.  Good turgor.  No abrasions.  No ecchymoses.  Eyes: No conjunctival injection. EOMI, PERRL.  Head: NC/AT  ENT: Oropharynx clear.    Chest:  Diminished breath sounds in lower lung fields, rhonchi appreciated in bilateral fields with increased work of breathing, bilateral chest rise.  Cardiovascular:  Tachycardic and irregularly irregular.  Normal equal bilateral radial pulses.  Abdomen: Soft.  Not distended.  Nontender.  No guarding.  No rebound. No Masses  Musculoskeletal: Good range of motion all joints.  No deformities.  Neck supple, full range of motion, no obvious deformity.  Neurologic: Moves all extremities.  Normal sensation.  No facial droop.  Normal speech.    Mental Status:  Alert and oriented x 3.  Appropriate, conversant.      Results and  Medications    Procedures    Labs Reviewed   CBC W/ AUTO DIFFERENTIAL - Abnormal; Notable for the following components:       Result Value    Hemoglobin 13.2 (*)     MCV 80 (*)     MCH 25.1 (*)     MCHC 31.3 (*)     RDW 18.0 (*)     All other components within normal limits   COMPREHENSIVE METABOLIC PANEL - Abnormal; Notable for the following components:    CO2 33 (*)     Glucose 152 (*)     Albumin 3.0 (*)     All other components within normal limits   TROPONIN I - Abnormal; Notable for the following components:    Troponin I 0.161 (*)     All other components within normal limits   B-TYPE NATRIURETIC PEPTIDE - Abnormal; Notable for the following components:     (*)     All other components within normal limits   ISTAT PROCEDURE - Abnormal; Notable for the following components:    POC PCO2 66.2 (*)     POC PO2 24 (*)     POC HCO3 38.6 (*)     POC SATURATED O2 38 (*)     POC TCO2 41 (*)     All other components within normal limits   POCT GLUCOSE - Abnormal; Notable for the following components:    POCT Glucose 159 (*)     All other components within normal limits   SARS-COV2 (COVID) WITH FLU/RSV BY PCR   TSH   TSH   TROPONIN I   POCT GLUCOSE MONITORING CONTINUOUS       Imaging Results              X-Ray Chest AP Portable (Final result)  Result time 12/05/22 21:18:55      Final result by Antony Samayoa MD (12/05/22 21:18:55)                   Impression:      No acute findings.    No significant change from prior study.      Electronically signed by: Antony Samayoa MD  Date:    12/05/2022  Time:    21:18               Narrative:    EXAMINATION:  XR CHEST AP PORTABLE    CLINICAL HISTORY:  CHF;    TECHNIQUE:  Single frontal view of the chest was performed.    COMPARISON:  11/18/2022.    FINDINGS:  Heart and lungs  appear unchanged when allowing for differences in technique and positioning.                                          Medications   diltiaZEM 125 mg in D5W 125 mL infusion (has no administration  in time range)   albuterol-ipratropium 2.5 mg-0.5 mg/3 mL nebulizer solution 3 mL (3 mLs Nebulization Given 12/5/22 2043)   methylPREDNISolone sodium succinate injection 125 mg (125 mg Intravenous Given 12/5/22 2023)   diltiaZEM injection 20 mg (20 mg Intravenous Given 12/5/22 2023)   diltiaZEM tablet 30 mg (30 mg Oral Given 12/5/22 2029)       MDM, Impression and Plan   Previous Records:   I decided to obtain old medical records which is listed here or in ED course:   See HPI  ECHO on 11/21/2022 shows   The left ventricle is normal in size with normal systolic function.  The estimated ejection fraction is 53%.  There are segmental left ventricular wall motion abnormalities.  Normal left ventricular diastolic function.  There is abnormal septal wall motion.  Normal right ventricular size with normal right ventricular systolic function.  Normal central venous pressure (3 mmHg).  There is a small right pleural effusion.       Initial Assessment:   Urgent evaluation of 62 y.o. male with history as above who presents the ED with chief complaint of shortness of breath.  On arrival to the ED he is noted to be afebrile, tachycardic to 172 with a stable blood pressure satting 100% on 2 L.  EMS gave the patient 10 of IV diltiazem and a DuoNeb prior to arrival.  Differential Diagnosis:   -COPD exacerbation versus CHF exacerbation   -pneumonia  -COVID   -electrolyte abnormalities  -anemia  -unlikely ACS  Independently Interpreted Test(s):   EKG:  I independently reviewed and interpreted the EKG and my findings are as follows:   Detailed here or in ED course.  Initial EKG shows AFib with RVR and a ventricular rate of 191 beats per minute.    Repeat EKG shows AFib with RVR with a ventricular rate of 177 beats per minute.    After patient was given IV Dilt,  EKG shows atrial fib with RVR with ventricular rate of 152 beats per minute.  Narrow QRS.  No ST segment elevations depressions.  No STEMI  IMAGING:  I have ordered and  independently interpreted X-rays and my findings are as follow:  Detailed here or in ED course. CXR shows no pneumothorax, pneumonia or pleural effusions.      Clinical Tests:   Lab Tests: Ordered and Reviewed  Radiological Study: Ordered and Reviewed  Medical Tests: Ordered and Reviewed    ED Management:    Wells' Criteria for Pulmonary Embolism from [email or web address]  on 12/5/2022  ** All calculations should be rechecked by clinician prior to use **  RESULT SUMMARY:  1.5 points  Low risk group: 1.3% chance of PE in an ED population.   Another study assigned scores ? 4 as PE Unlikely and had a 3% incidence of PE.  INPUTS:  Clinical signs and symptoms of DVT --> 0 = No  PE is #1 diagnosis OR equally likely --> 0 = No  Heart rate > 100 --> 1.5 = Yes  Immobilization at least 3 days OR surgery in the previous 4 weeks --> 0 = No  Previous, objectively diagnosed PE or DVT --> 0 = No  Hemoptysis --> 0 = No  Malignancy w/ treatment within 6 months or palliative --> 0 = No    Patient started on BiPAP.  He was initially given a DuoNeb yet this was held due to his tachycardia.  He was given a dose of IV Dilt in the ED as well as p.o. Dilt.  Due to his AFib with RVR.  This did not control his thus a Dilt drip was ordered.  For his presumed COPD exacerbation he was given Solu-Medrol.  COVID, fever, RSV negative.  Glucose within normal limits.  Physical exam BNP concerning for volume overload worsening COPD exacerbation.  He has a mildly elevated troponin that will need to be trended.  Hemoglobin near baseline.  Thought was given to pulmonary embolus yet patient is low risk by well's.  Will investigate other etiologies for his dyspnea at this time.  I called and discussed the case with Critical Care Medicine who was agreeable with seeing the patient.  Patient signed out to oncoming ED team pending critical care evaluation.  Please see their note for final disposition   I called and discussed the case with:  Critical care  medicine    Please see ED course for discussion of workup and dispo if not listed above.              Final diagnoses:  [R06.02] Shortness of breath  [R00.0] Tachycardia  [I48.91] Atrial fibrillation, unspecified type (Primary)  [J44.1] COPD exacerbation  [R77.8] Elevated troponin      ED Disposition Condition    Admit Stable               ED Course as of 12/05/22 2217   Mon Dec 05, 2022   1953 EMS: 90 % on arrival. Glucose wnl.   Tachycardic - 10 IV cardiazem and duoneb given  []   2128 Spoke with cardiology, who recommended against ACS protocol at this time. Recommends conversation with MICU.  []   2130 Called ICU    []   2133 Spoke with Critical Care Medicine who agreed to see the patient []      ED Course User Index  [] MD Renee Herman MD   Emergency Resident   [phone] (spectra)    Please note that this dictation was completed with computer voicerecognition software.  Quite often unanticipated grammatical, syntax, homophones, and other interpretive errors are inadvertently transcribed by the computer software.  Please disregard these errors.  Please excuse any errors that have escaped final proofreading.         Renee Rooney MD  Resident  12/05/22 2217

## 2022-12-06 NOTE — PLAN OF CARE
[facility] Cardiac Medical ICU  Initial Discharge Assessment       Primary Care Provider: No primary care provider on file.    Admission Diagnosis: Shortness of breath [R06.02]  Tachycardia [R00.0]  Elevated troponin [R77.8]  COPD exacerbation [J44.1]  Atrial fibrillation, unspecified type [I48.91]    Admission Date: 12/5/2022  Expected Discharge Date: 12/9/2022    Discharge Barriers Identified: None    Payor: HUMANA MANAGED MEDICARE / Plan: HUMANA MEDICARE [facility] / Product Type: Medicare Advantage /     Extended Emergency Contact Information  Primary Emergency Contact: Gladis Mccray   [place]  Mobile Phone: [phone]  Relation: Sister  Secondary Emergency Contact: Mahendra Viry   [place]  Mobile Phone: [phone]  Relation: Sister    Discharge Plan A: Home Health  Discharge Plan B: Home      [facility]  [address]  [place] [address]  Phone: [phone] Fax: [phone]    [id number] - [place] - [facility] AT [facility] [address]  [address] [place] [address]  Phone: [phone] Fax: [phone]      Initial Assessment (most recent)       Adult Discharge Assessment - 12/06/22 1414          Discharge Assessment    Assessment Type Discharge Planning Assessment     Confirmed/corrected address, phone number and insurance Yes     Confirmed Demographics Correct on Facesheet     Source of Information patient     When was your last doctors appointment? --   Patient refused    Communicated URTH with patient/caregiver Date not available/Unable to determine     Reason For Admission SOB     People in Home alone     Do you expect to return to your current living situation? Yes     Do you have help at home or someone to help you manage your care at home? No     Prior to hospitilization cognitive status: Alert/Oriented     Current cognitive status: Alert/Oriented     Walking or Climbing Stairs  ambulation difficulty, requires equipment     Mobility Management Oxygen     Equipment Currently Used at Home nebulizer;oxygen;BIPAP     Readmission within 30 days? Yes     Patient currently being followed by outpatient case management? No     Do you currently have service(s) that help you manage your care at home? Yes     Name and Contact number of agency Home health but patient doesn't remember the nam     Is the pt/caregiver preference to resume services with current agency Yes     Do you take prescription medications? Yes     Do you have prescription coverage? Yes     Coverage [employer] Managed Medicare - [facility]     Do you have any problems affording any of your prescribed medications? No     Is the patient taking medications as prescribed? yes     Who is going to help you get home at discharge? Pt will need a ride     How do you get to doctors appointments? car, drives self     Are you on dialysis? No     Do you take coumadin? No     Discharge Plan A Home Health     Discharge Plan B Home     DME Needed Upon Discharge  oxygen     Discharge Plan discussed with: Patient     Discharge Barriers Identified None        Physical Activity    On average, how many days per week do you engage in moderate to strenuous exercise (like a brisk walk)? Patient refused     On average, how many minutes do you engage in exercise at this level? Patient refused        Financial Resource Strain    How hard is it for you to pay for the very basics like food, housing, medical care, and heating? Not hard at all        Housing Stability    In the last 12 months, was there a time when you were not able to pay the mortgage or rent on time? No     In the last 12 months, how many places have you lived? 2     In the last 12 months, was there a time when you did not have a steady place to sleep or slept in a shelter (including now)? No        Transportation Needs    In the past 12 months, has lack of transportation kept you from medical appointments  or from getting medications? No     In the past 12 months, has lack of transportation kept you from meetings, work, or from getting things needed for daily living? No        Food Insecurity    Within the past 12 months, you worried that your food would run out before you got the money to buy more. Patient refused     Within the past 12 months, the food you bought just didn't last and you didn't have money to get more. Patient refused        Stress    Do you feel stress - tense, restless, nervous, or anxious, or unable to sleep at night because your mind is troubled all the time - these days? Rather much        Social Connections    How often do you get together with friends or relatives? Twice a week     How often do you attend Sikhism or Restorationist services? Never     Do you belong to any clubs or organizations such as Sikhism groups, unions, fraternal or athletic groups, or school groups? No     How often do you attend meetings of the clubs or organizations you belong to? Patient refused     Are you , , , , never , or living with a partner? Never         Alcohol Use    Q2: How many drinks containing alcohol do you have on a typical day when you are drinking? Patient does not drink                   Spoke to patient.  Patient lives alone. Post hospital stay sister Griffith will be his support person  and help manage his care in the home.  Patient has transportation at discharge with tammy.  There have been hospitalizations within the last 30 days per patient.  Patient states not on Coumadin and is not receiving dialysis.  All questions answered regarding Case Management Discharge Planning, patient verbalized understanding.   SW placed contact # on white board in patient's room.  SW will continue to follow while remain on MICU.    Trena Yan LMSW  [facility]  [phone]

## 2022-12-06 NOTE — ASSESSMENT & PLAN NOTE
Recurrent admissions for COPD exacerbations. Here with ongoing and unchanged cough. Dyspnea now improved.     -see tx plan for respiratory failure

## 2022-12-06 NOTE — H&P
[facility] - Emergency Dept  Critical Care Medicine  History & Physical    Patient Name: Baltazar Mccray  MRN: [id number]  Admission Date: 12/5/2022  Hospital Length of Stay: 1 days  Code Status: Full Code  Attending Physician: Tarah Patel MD   Primary Care Provider: Alexander Young MD   Principal Problem: <principal problem not specified>    Subjective:     HPI:  62 y.o. male with COPD on  2LNC home O2, Atrial Fibrillation, HTN, HFpEF, seizure disorder  who presents to the ED with shortness of breath. History mostly obtained from ED records as patient on BiPAP when seen. He refers about three days of worsening SOB despite increasing use of in his inhalers. Also refers an ongoing cough that is not changed from his baseline. x3 days. He denies fevers, chills, chest pain, palpitations, abd pain and dysuria. Patient arrived to ED w EMS, who reportedly noted him to be satting 90% on his usual 2L. Was noted to be tachycardic with EKG concerning for AF RVR so was given 10mg of IV diltiazem.    At the ED patient found to be in AF RVR with rates up to 170 however hemodynamically stable. CBC/CMP not overtly unremarkable. Troponin 0.161, . ABG 7.37/66. CXR showing hyperinflated lungs/flattened diaphrams without any consolidation. He was given IV methylpred, duonebs, and diltiazem. When seen patient visibly tachypneic on BiPAP. Patient admitted to MICU for further care.         Hospital/ICU Course:  No notes on file     Past Medical History:   Diagnosis Date    Aspiration pneumonia 5/30/2021    Asthma     Atrial fibrillation with rapid ventricular response     CHF (congestive heart failure)     COPD (chronic obstructive pulmonary disease)     home O2 at night only    Coronary artery disease     Hypertension     Lung nodule     Pneumonia     Seizures        Past Surgical History:   Procedure Laterality Date    ESOPHAGOGASTRODUODENOSCOPY N/A 2/11/2022    Procedure: EGD (ESOPHAGOGASTRODUODENOSCOPY);  Surgeon:  Cain Ortega MD;  Location: [facility] ENDO (2ND FLR);  Service: Endoscopy;  Laterality: N/A;    ESOPHAGOGASTRODUODENOSCOPY N/A 9/15/2022    Procedure: EGD (ESOPHAGOGASTRODUODENOSCOPY);  Surgeon: Leonid Chavez MD;  Location: [facility] ENDO (2ND FLR);  Service: Endoscopy;  Laterality: N/A;  PA-50 - 2nd floor  vacc-wears 2L o2-inst mail and verbal-clears 4 hrs prior-tb  8/30 pt rescheduled; updated instructions mailed-st    LEFT HEART CATHETERIZATION  4/23/2020    Procedure: Left heart cath;  Surgeon: Nic Pollack MD;  Location: [facility] CATH LAB;  Service: Cardiology;;       Review of patient's allergies indicates:   Allergen Reactions    Benazepril Swelling       Family History       Problem Relation (Age of Onset)    Cancer Father    Hypertension Sister    Stroke Sister, Brother          Tobacco Use    Smoking status: Some Days     Packs/day: 0.25     Types: Cigarettes    Smokeless tobacco: Never    Tobacco comments:     1-2 cigarettes per day   Substance and Sexual Activity    Alcohol use: Yes     Alcohol/week: 2.0 standard drinks     Types: 2 Cans of beer per week     Comment: every night    Drug use: No    Sexual activity: Not Currently      Review of Systems   Constitutional:  Negative for chills, fatigue and fever.   HENT:  Negative for congestion and sore throat.    Eyes:  Negative for visual disturbance.   Respiratory:  Positive for cough and shortness of breath. Negative for wheezing.    Cardiovascular:  Negative for chest pain, palpitations and leg swelling.   Gastrointestinal:  Negative for abdominal pain, diarrhea, nausea and vomiting.   Endocrine: Negative for polyuria.   Genitourinary:  Negative for dysuria, flank pain and frequency.   Musculoskeletal:  Negative for arthralgias, back pain and myalgias.   Skin:  Negative for pallor and rash.   Neurological:  Negative for light-headedness and headaches.   Psychiatric/Behavioral:  Positive for sleep disturbance.    Objective:     Vital Signs (Most  Recent):  Temp: 98.8 °F (37.1 °C) (12/05/22 1950)  Pulse: (!) 134 (12/05/22 2317)  Resp: (!) 24 (12/05/22 2317)  BP: 101/61 (12/05/22 2319)  SpO2: 97 % (12/05/22 2318)   Vital Signs (24h Range):  Temp:  [98.8 °F (37.1 °C)] 98.8 °F (37.1 °C)  Pulse:  [] 134  Resp:  [23-28] 24  SpO2:  [96 %-100 %] 97 %  BP: ()/(59-95) 101/61   Weight: 57.2 kg (126 lb)  Body mass index is 19.16 kg/m².      Intake/Output Summary (Last 24 hours) at 12/5/2022 2333  Last data filed at 12/5/2022 2328  Gross per 24 hour   Intake 2.33 ml   Output --   Net 2.33 ml       Physical Exam  Vitals and nursing note reviewed.   Constitutional:       General: He is in acute distress.      Appearance: He is not toxic-appearing.   HENT:      Head: Normocephalic and atraumatic.      Mouth/Throat:      Mouth: Mucous membranes are moist.      Pharynx: No oropharyngeal exudate.   Eyes:      Extraocular Movements: Extraocular movements intact.      Pupils: Pupils are equal, round, and reactive to light.   Cardiovascular:      Rate and Rhythm: Normal rate and regular rhythm.      Heart sounds: No murmur heard.     Comments: No jvd  Pulmonary:      Effort: Respiratory distress present.      Breath sounds: No wheezing or rales.   Abdominal:      General: Abdomen is flat. Bowel sounds are normal. There is no distension.      Tenderness: There is no abdominal tenderness. There is no guarding.   Musculoskeletal:         General: No swelling or tenderness. Normal range of motion.      Cervical back: Normal range of motion.      Right lower leg: No edema.      Left lower leg: No edema.   Lymphadenopathy:      Cervical: No cervical adenopathy.   Skin:     General: Skin is warm.      Coloration: Skin is not jaundiced.      Findings: No bruising or rash.   Neurological:      General: No focal deficit present.      Mental Status: He is alert and oriented to person, place, and time.      Cranial Nerves: No cranial nerve deficit.       Vents:  Oxygen  Concentration (%): 50 (12/05/22 2043)  Lines/Drains/Airways       None                 Significant Labs:    CBC/Anemia Profile:  Recent Labs   Lab 12/05/22 2004   WBC 8.23   HGB 13.2*   HCT 42.2      MCV 80*   RDW 18.0*        Chemistries:  Recent Labs   Lab 12/05/22 2004      K 4.5   CL 97   CO2 33*   BUN 7   CREATININE 0.9   CALCIUM 9.8   ALBUMIN 3.0*   PROT 7.1   BILITOT 0.5   ALKPHOS 68   ALT 16   AST 20       All pertinent labs within the past 24 hours have been reviewed.    Significant Imaging: I have reviewed all pertinent imaging results/findings within the past 24 hours.    Assessment/Plan:     Psychiatric  Alcohol use disorder, mild, abuse    - no signs or symptoms of withdrawal on admission  - CIWA q6h, PRN benzos for CIWA >8      Pulmonary  Acute on chronic respiratory failure with hypoxia and hypercapnia  Patient with Hypercapnic and Hypoxic Respiratory failure which is Acute on chronic.  he is on home oxygen at 2 LPM. Supplemental oxygen was provided and noted- Oxygen Concentration (%):  [100] 100.   Signs/symptoms of respiratory failure include- tachypnea and increased work of breathing. Contributing diagnoses includes - COPD and AF RVR Labs and images were reviewed. Patient Has recent ABG, which has been reviewed. Will treat underlying causes and adjust management of respiratory failure as follows-   - Prednisone 40mg PO x5d  - Levalbuterol q8h, Breo  - BiPAP qhs  - will monitor on telemetry  - pulse oximetry q4h     COPD exacerbation  Recurrent admissions for COPD exacerbations. Here with ongoign and unchanged cough and subjectively more dyspneic.     -see tx plan for respiratory failure    Cardiac/Vascular  Persistent atrial fibrillation  62M hx of COPD on 2L, Atrial Fibrillation, HFpEF (EF 53%) here with increased dyspnea in the setting of inhaler use at home and found to be in AF RVR on admission with rates up to 170bpm. Hemodynamically stable on admission. He was given IV  cardizem, nebs at ED. Currently on no rate control medications at home despite, documentation saying he was to continue his calcium channel blocker on his last discharge.    -continuing diltiazem drip  -starting heparin gtt; CHADS VASC of 2 (HTN, CHF)  -telemetry  -Mg >2, K >4    Essential hypertension  -holding home nifepine in the setting of low-normal BP         Critical Care Daily Checklist:    A: Awake: RASS Goal/Actual Goal:    Actual:     B: Spontaneous Breathing Trial Performed?     C: SAT & SBT Coordinated?  no                      D: Delirium: CAM-ICU     E: Early Mobility Performed? Yes   F: Feeding Goal:    Status:     Current Diet Order   Procedures    Diet Cardiac      AS: Analgesia/Sedation none   T: Thromboembolic Prophylaxis Hep gtt   H: HOB > 300 Yes   U: Stress Ulcer Prophylaxis (if needed) no   G: Glucose Control no   B: Bowel Function     I: Indwelling Catheter (Lines & Lucas) Necessity    D: De-escalation of Antimicrobials/Pharmacotherapies no    Plan for the day/ETD admit    Code Status:  Family/Goals of Care: Full Code         Critical secondary to Patient has a condition that poses threat to life and bodily function: Severe Respiratory Distress     Critical care was time spent personally by me on the following activities: development of treatment plan with patient or surrogate and bedside caregivers, discussions with consultants, evaluation of patient's response to treatment, examination of patient, ordering and performing treatments and interventions, ordering and review of laboratory studies, ordering and review of radiographic studies, pulse oximetry, re-evaluation of patient's condition. This critical care time did not overlap with that of any other provider or involve time for any procedures.     Stewart Srivastava MD  Critical Care Medicine  [facility] - Emergency Dept

## 2022-12-06 NOTE — HPI
62 y.o. male with COPD on  2LNC home O2, Atrial Fibrillation, HTN, HFpEF, seizure disorder  who presents to the ED with shortness of breath. History mostly obtained from ED records as patient on BiPAP when seen. He refers about three days of worsening SOB despite increasing use of in his inhalers. Also refers an ongoing cough that is not changed from his baseline. x3 days. He denies fevers, chills, chest pain, palpitations, abd pain and dysuria. Patient arrived to ED w EMS, who reportedly noted him to be satting 90% on his usual 2L. Was noted to be tachycardic with EKG concerning for AF RVR so was given 10mg of IV diltiazem.    At the ED patient found to be in AF RVR with rates up to 170 however hemodynamically stable. CBC/CMP not overtly unremarkable. Troponin 0.161, . ABG 7.37/66. CXR showing hyperinflated lungs/flattened diaphrams without any consolidation. He was given IV methylpred, duonebs, and diltiazem. When seen patient visibly tachypneic on BiPAP. Patient admitted to MICU for further care.

## 2022-12-06 NOTE — PLAN OF CARE
"Management Plan Update - High Risk  Patient Name: Baltazar Mccray         CM follows patient as a high risk patient. CM spoke w/ patient Monday 12/5 and pt reported SOB and having difficulty w/ Bipap stating the DME "needed water and was drying his mouth out". CM contacted [facility] [phone] and requested that they contact pt and attempt to troubleshoot Bipap. Pt states he may need to report to the ED d/t SOB. CM [facility] @ 11am and setup Lyft ride.    CM noted that pt did present to ED overnight and is now in MICU. CM spoke w/ [facility] - respiratory tech did contact pt, remote in to device and made adjustments to pt's Bipap. CM cancelled pt's appt and Lyft ride.    Pt has transitioned from Medicaid to [employer] this year but has not established w/ a PCP. Pt will need a Hosp FU/Est Care appt at [facility] at discharge.     DINO WheatN, RN, [facility]  Transitional Care Manager  [phone]  [email or web address]    "

## 2022-12-06 NOTE — ED TRIAGE NOTES
Pt arrives to Ed with elevated HR. Pt reports feeling short of breath, per EMS pt HR between 120s-220s. Pt lying in bed, respirations even, unlabored, NAD noted, AAOx4,answering questions appropriately. Pt placed on BP cycling, pulse ox, and cardiac monitoring. Pt placed on pads

## 2022-12-06 NOTE — SUBJECTIVE & OBJECTIVE
Interval History/Significant Events: No significant events overnight.    Review of Systems   Constitutional:  Negative for chills, fatigue and fever.   HENT:  Negative for congestion and sore throat.    Eyes:  Negative for visual disturbance.   Respiratory:  Positive for cough and shortness of breath. Negative for wheezing.    Cardiovascular:  Negative for chest pain, palpitations and leg swelling.   Gastrointestinal:  Negative for abdominal pain, diarrhea, nausea and vomiting.   Endocrine: Negative for polyuria.   Genitourinary:  Negative for dysuria, flank pain and frequency.   Musculoskeletal:  Negative for arthralgias, back pain and myalgias.   Skin:  Negative for pallor and rash.   Neurological:  Negative for light-headedness and headaches.   Psychiatric/Behavioral:  Positive for sleep disturbance.    Objective:     Vital Signs (Most Recent):  Temp: 98.3 °F (36.8 °C) (12/06/22 1100)  Pulse: 91 (12/06/22 1100)  Resp: (!) 26 (12/06/22 1100)  BP: (!) 98/55 (12/06/22 1100)  SpO2: (!) 94 % (12/06/22 1100)   Vital Signs (24h Range):  Temp:  [97.9 °F (36.6 °C)-98.8 °F (37.1 °C)] 98.3 °F (36.8 °C)  Pulse:  [] 91  Resp:  [18-31] 26  SpO2:  [92 %-100 %] 94 %  BP: ()/(50-95) 98/55   Weight: 57.2 kg (126 lb 1.7 oz)  Body mass index is 19.17 kg/m².      Intake/Output Summary (Last 24 hours) at 12/6/2022 1124  Last data filed at 12/6/2022 1000  Gross per 24 hour   Intake 164.58 ml   Output 150 ml   Net 14.58 ml       Physical Exam  Vitals and nursing note reviewed.   Constitutional:       Appearance: Normal appearance.      Comments: Patient laying in bed with NC in place.   HENT:      Head: Normocephalic and atraumatic.      Mouth/Throat:      Mouth: Mucous membranes are moist.      Pharynx: No oropharyngeal exudate.   Eyes:      Extraocular Movements: Extraocular movements intact.      Pupils: Pupils are equal, round, and reactive to light.   Cardiovascular:      Rate and Rhythm: Normal rate and regular rhythm.       Heart sounds: No murmur heard.     Comments: No JVD  Pulmonary:      Effort: No respiratory distress.      Breath sounds: No wheezing or rales.   Abdominal:      General: Abdomen is flat. Bowel sounds are normal. There is no distension.      Tenderness: There is no abdominal tenderness. There is no guarding.   Musculoskeletal:         General: No swelling or tenderness. Normal range of motion.      Cervical back: Normal range of motion.      Right lower leg: No edema.      Left lower leg: No edema.   Lymphadenopathy:      Cervical: No cervical adenopathy.   Skin:     General: Skin is warm.      Coloration: Skin is not jaundiced.      Findings: No bruising or rash.   Neurological:      General: No focal deficit present.      Mental Status: He is alert and oriented to person, place, and time.      Cranial Nerves: No cranial nerve deficit.   Psychiatric:         Mood and Affect: Mood normal.       Vents:  Oxygen Concentration (%): 40 (12/06/22 0700)  Lines/Drains/Airways       Peripheral Intravenous Line  Duration                  Peripheral IV - Single Lumen 12/06/22 0200 20 G Anterior;Right Forearm <1 day         Peripheral IV - Single Lumen 12/06/22 20 G Left Antecubital <1 day                  Significant Labs:    CBC/Anemia Profile:  Recent Labs   Lab 12/05/22 2004 12/06/22 0147   WBC 8.23 6.45   HGB 13.2* 12.3*   HCT 42.2 39.6*    303   MCV 80* 79*   RDW 18.0* 17.7*        Chemistries:  Recent Labs   Lab 12/05/22 2004 12/06/22  0147    136   K 4.5 4.2   CL 97 98   CO2 33* 26   BUN 7 9   CREATININE 0.9 0.9   CALCIUM 9.8 9.4   ALBUMIN 3.0* 2.9*   PROT 7.1 6.9   BILITOT 0.5 0.6   ALKPHOS 68 64   ALT 16 15   AST 20 17   MG  --  1.7   PHOS  --  3.6       CMP:   Recent Labs   Lab 12/05/22 2004 12/06/22 0147    136   K 4.5 4.2   CL 97 98   CO2 33* 26   * 175*   BUN 7 9   CREATININE 0.9 0.9   CALCIUM 9.8 9.4   PROT 7.1 6.9   ALBUMIN 3.0* 2.9*   BILITOT 0.5 0.6   ALKPHOS 68 64   AST 20  17   ALT 16 15   ANIONGAP 9 12       Significant Imaging:  I have reviewed all pertinent imaging results/findings within the past 24 hours.

## 2022-12-06 NOTE — ASSESSMENT & PLAN NOTE
62M hx of COPD on 2L, Atrial Fibrillation, HFpEF (EF 53%) here with increased dyspnea in the setting of inhaler use at home and found to be in AF RVR on admission with rates up to 170bpm. Hemodynamically stable on admission. He was given IV cardizem, nebs at ED. Currently on no rate control medications at home despite, documentation saying he was to continue his calcium channel blocker on his last discharge.    -continuing diltiazem drip  -starting heparin gtt; CHADS VASC of 2 (HTN, CHF)  -telemetry  -Mg >2, K >4

## 2022-12-06 NOTE — ASSESSMENT & PLAN NOTE
Patient with Hypercapnic and Hypoxic Respiratory failure which is Acute on chronic.  he is on home oxygen at 2 LPM. Supplemental oxygen was provided and noted- Oxygen Concentration (%):  [100] 100.   Signs/symptoms of respiratory failure include- tachypnea and increased work of breathing. Contributing diagnoses includes - COPD and AF RVR Labs and images were reviewed. Patient Has recent ABG, which has been reviewed. Will treat underlying causes and adjust management of respiratory failure as follows-   - Prednisone 40mg PO x5d  - Levalbuterol q8h, Breo  - BiPAP qhs  - will monitor on telemetry  - pulse oximetry q4h

## 2022-12-06 NOTE — PLAN OF CARE
CMICU DAILY GOALS       A: Awake    RASS: Goal -    Actual -     Restraint necessity:    B: Breathe   SBT: Not intubated   C: Coordinate A & B, analgesics/sedatives   Pain: managed    SAT: Not intubated  D: Delirium   CAM-ICU: Overall CAM-ICU: Negative  E: Early(intubated/ Progressive (non-intubated) Mobility   MOVE Screen: Pass   Activity: Activity Management: Partial stand - L2  FAS: Feeding/Nutrition   Diet order: Diet/Nutrition Received: low saturated fat/low cholesterol,    T: Thrombus   DVT prophylaxis: VTE Required Core Measure: Pharmacological prophylaxis initiated/maintained  H: HOB Elevation   Head of Bed (HOB) Positioning: HOB at 20-30 degrees  U: Ulcer Prophylaxis   GI: no  G: Glucose control   managed    S: Skin   Bathing/Skin Care: bath, complete, linen changed  Device Skin Pressure Protection: absorbent pad utilized/changed, skin-to-device areas padded, skin-to-skin areas padded  Pressure Reduction Devices: pressure-redistributing mattress utilized  Pressure Reduction Techniques: frequent weight shift encouraged  Skin Protection: adhesive use limited  B: Bowel Function   no issues   I: Indwelling Catheters   Lucas necessity:     CVC necessity: No  D: De-escalation Antibiotics   Yes    Family/Goals of care/Code Status   Code Status: Full Code    24H Vital Sign Range  Temp:  [98.2 °F (36.8 °C)-98.8 °F (37.1 °C)]   Pulse:  []   Resp:  [18-30]   BP: ()/(50-95)   SpO2:  [95 %-100 %]      Shift Events   No acute events throughout shift    VS and assessment per flow sheet, patient progressing towards goals as tolerated, plan of care reviewed with Baltazar, all concerns addressed, will continue to monitor.    Katalina Garcia

## 2022-12-06 NOTE — ASSESSMENT & PLAN NOTE
Recurrent admissions for COPD exacerbations. Here with ongoign and unchanged cough and subjectively more dyspneic.     -see tx plan for respiratory failure

## 2022-12-06 NOTE — ASSESSMENT & PLAN NOTE
62M hx of COPD on 2L, Atrial Fibrillation, HFpEF (EF 53%) here with increased dyspnea in the setting of inhaler use at home and found to be in AF RVR on admission with rates up to 170bpm. Hemodynamically stable on admission. He was given IV cardizem, nebs at ED.    -Stopped diltiazem drip. Start Metoprolol 25mg PO TID.  -Stopped heparin gtt. Start Apixaban 5mg PO BID  -telemetry  -Mg >2, K >4

## 2022-12-06 NOTE — HOSPITAL COURSE
Patient was admitted on BiPAP for acute on chronic respiratory failure with hypoxia and hypercapnia 2/2 COPD exacerbation. He received continuous Diltiazem and was placed on a heparin drip. Patient weaned off BiPAP and now is back on 2L NC, saturating well at baseline.  Diltiazem and heparin were stopped.  Patient started Metoprolol and Apixaban. Planned for stepdown. But patient continues to have episodes of afib with AVR, up to 150 bpm despite increased oral Metoprolol and IV pushes. Digoxin was added.  Consulted cardiology for possible cardioversion. Pt is bradycardic in high 50's, stopped Diltiazem drip.

## 2022-12-06 NOTE — PLAN OF CARE
Shift Events  Patient AAOx4; oxygenating on 2L O2 via NC with intermittent reports of shortness of breath. Remains in afib at this time with rate varying from 70s-120s. Heparin and diltiazem gtts discontinued; PO regimen initiated. Transfer orders in place. VS and assessment per flow sheet, patient progressing towards goals as tolerated, plan of care reviewed with patient, all concerns addressed, will continue to monitor.    Problem: Adult Inpatient Plan of Care  Goal: Plan of Care Review  Outcome: Ongoing, Progressing  Flowsheets (Taken 12/6/2022 1549)  Plan of Care Reviewed With: patient  Goal: Patient-Specific Goal (Individualized)  Outcome: Ongoing, Progressing  Flowsheets (Taken 12/6/2022 1549)  Anxieties, Fears or Concerns: Concerned about his episodic shortness of breath  Individualized Care Needs: Provide phone for patient to call kitchen for dietary preferences  Patient-Specific Goals (Include Timeframe):   Control HR to WDL   convert from afib to SR  Goal: Absence of Hospital-Acquired Illness or Injury  Outcome: Ongoing, Progressing  Goal: Optimal Comfort and Wellbeing  Outcome: Ongoing, Progressing     Problem: Fall Injury Risk  Goal: Absence of Fall and Fall-Related Injury  Outcome: Ongoing, Progressing     CMICU DAILY GOALS     A: Awake    RASS: Goal - 0 alert and calm  Actual - 0 alert and calm   Restraint necessity: No  B: Breathe   SBT: Not intubated   C: Coordinate A & B, analgesics/sedatives   Pain: managed    SAT: Not intubated  D: Delirium   CAM-ICU: Overall CAM-ICU: Negative  E: Early(intubated/ Progressive (non-intubated) Mobility   MOVE Screen: Fail   Activity: Activity Management: Arm raise - L1  FAS: Feeding/Nutrition   Diet order: Diet/Nutrition Received: low saturated fat/low cholesterol, 2 gram sodium,    T: Thrombus   DVT prophylaxis: VTE Required Core Measure: Pharmacological prophylaxis initiated/maintained  H: HOB Elevation   Head of Bed (HOB) Positioning: HOB at 15 degrees  U: Ulcer  Prophylaxis   GI: no  G: Glucose control   managed    S: Skin   Bathing/Skin Care: bath, complete, linen changed  Device Skin Pressure Protection: absorbent pad utilized/changed, positioning supports utilized, pressure points protected, skin-to-device areas padded  Pressure Reduction Devices: foam padding utilized, positioning supports utilized, pressure-redistributing mattress utilized  Pressure Reduction Techniques: positioned off wounds, pressure points protected, frequent weight shift encouraged  Skin Protection: adhesive use limited, incontinence pads utilized, skin-to-device areas padded, transparent dressing maintained, tubing/devices free from skin contact  B: Bowel Function   no issues   I: Indwelling Catheters   Lucas necessity: No   CVC necessity: No  D: De-escalation Antibiotics   No abx per MAR    Family/Goals of care/Code Status   Code Status: Full Code    24H Vital Sign Range  Temp:  [97.2 °F (36.2 °C)-98.8 °F (37.1 °C)]   Pulse:  []   Resp:  [18-31]   BP: ()/(50-95)   SpO2:  [92 %-100 %]

## 2022-12-06 NOTE — CONSULTS
Consult received. Patient to be admitted to the MICU. Full H&P to follow.    Libby Perry NP  Critical Care Medicine  12/5/2022   11:29 PM]

## 2022-12-06 NOTE — ED NOTES
Attempted multiple times to obtain 2nd IV on pt, pt refusing, unformed of need for 2nd IV due to critical care status. Pt continues to refuse.

## 2022-12-06 NOTE — SUBJECTIVE & OBJECTIVE
Past Medical History:   Diagnosis Date    Aspiration pneumonia 5/30/2021    Asthma     Atrial fibrillation with rapid ventricular response     CHF (congestive heart failure)     COPD (chronic obstructive pulmonary disease)     home O2 at night only    Coronary artery disease     Hypertension     Lung nodule     Pneumonia     Seizures        Past Surgical History:   Procedure Laterality Date    ESOPHAGOGASTRODUODENOSCOPY N/A 2/11/2022    Procedure: EGD (ESOPHAGOGASTRODUODENOSCOPY);  Surgeon: Cain Ortega MD;  Location: [facility] ENDO (2ND FLR);  Service: Endoscopy;  Laterality: N/A;    ESOPHAGOGASTRODUODENOSCOPY N/A 9/15/2022    Procedure: EGD (ESOPHAGOGASTRODUODENOSCOPY);  Surgeon: Leonid Chavez MD;  Location: [facility] ENDO (2ND FLR);  Service: Endoscopy;  Laterality: N/A;  PA-50 - 2nd floor  vacc-wears 2L o2-inst mail and verbal-clears 4 hrs prior-tb  8/30 pt rescheduled; updated instructions mailed-    LEFT HEART CATHETERIZATION  4/23/2020    Procedure: Left heart cath;  Surgeon: Nic Pollack MD;  Location: [facility] CATH LAB;  Service: Cardiology;;       Review of patient's allergies indicates:   Allergen Reactions    Benazepril Swelling       Family History       Problem Relation (Age of Onset)    Cancer Father    Hypertension Sister    Stroke Sister, Brother          Tobacco Use    Smoking status: Some Days     Packs/day: 0.25     Types: Cigarettes    Smokeless tobacco: Never    Tobacco comments:     1-2 cigarettes per day   Substance and Sexual Activity    Alcohol use: Yes     Alcohol/week: 2.0 standard drinks     Types: 2 Cans of beer per week     Comment: every night    Drug use: No    Sexual activity: Not Currently      Review of Systems   Constitutional:  Negative for chills, fatigue and fever.   HENT:  Negative for congestion and sore throat.    Eyes:  Negative for visual disturbance.   Respiratory:  Positive for cough and shortness of breath. Negative for wheezing.    Cardiovascular:  Negative for chest  pain, palpitations and leg swelling.   Gastrointestinal:  Negative for abdominal pain, diarrhea, nausea and vomiting.   Endocrine: Negative for polyuria.   Genitourinary:  Negative for dysuria, flank pain and frequency.   Musculoskeletal:  Negative for arthralgias, back pain and myalgias.   Skin:  Negative for pallor and rash.   Neurological:  Negative for light-headedness and headaches.   Psychiatric/Behavioral:  Positive for sleep disturbance.    Objective:     Vital Signs (Most Recent):  Temp: 98.8 °F (37.1 °C) (12/05/22 1950)  Pulse: (!) 134 (12/05/22 2317)  Resp: (!) 24 (12/05/22 2317)  BP: 101/61 (12/05/22 2319)  SpO2: 97 % (12/05/22 2318)   Vital Signs (24h Range):  Temp:  [98.8 °F (37.1 °C)] 98.8 °F (37.1 °C)  Pulse:  [] 134  Resp:  [23-28] 24  SpO2:  [96 %-100 %] 97 %  BP: ()/(59-95) 101/61   Weight: 57.2 kg (126 lb)  Body mass index is 19.16 kg/m².      Intake/Output Summary (Last 24 hours) at 12/5/2022 2333  Last data filed at 12/5/2022 2328  Gross per 24 hour   Intake 2.33 ml   Output --   Net 2.33 ml       Physical Exam  Vitals and nursing note reviewed.   Constitutional:       General: He is in acute distress.      Appearance: He is not toxic-appearing.   HENT:      Head: Normocephalic and atraumatic.      Mouth/Throat:      Mouth: Mucous membranes are moist.      Pharynx: No oropharyngeal exudate.   Eyes:      Extraocular Movements: Extraocular movements intact.      Pupils: Pupils are equal, round, and reactive to light.   Cardiovascular:      Rate and Rhythm: Normal rate and regular rhythm.      Heart sounds: No murmur heard.     Comments: No jvd  Pulmonary:      Effort: Respiratory distress present.      Breath sounds: No wheezing or rales.   Abdominal:      General: Abdomen is flat. Bowel sounds are normal. There is no distension.      Tenderness: There is no abdominal tenderness. There is no guarding.   Musculoskeletal:         General: No swelling or tenderness. Normal range of  motion.      Cervical back: Normal range of motion.      Right lower leg: No edema.      Left lower leg: No edema.   Lymphadenopathy:      Cervical: No cervical adenopathy.   Skin:     General: Skin is warm.      Coloration: Skin is not jaundiced.      Findings: No bruising or rash.   Neurological:      General: No focal deficit present.      Mental Status: He is alert and oriented to person, place, and time.      Cranial Nerves: No cranial nerve deficit.       Vents:  Oxygen Concentration (%): 50 (12/05/22 2043)  Lines/Drains/Airways       None                 Significant Labs:    CBC/Anemia Profile:  Recent Labs   Lab 12/05/22 2004   WBC 8.23   HGB 13.2*   HCT 42.2      MCV 80*   RDW 18.0*        Chemistries:  Recent Labs   Lab 12/05/22 2004      K 4.5   CL 97   CO2 33*   BUN 7   CREATININE 0.9   CALCIUM 9.8   ALBUMIN 3.0*   PROT 7.1   BILITOT 0.5   ALKPHOS 68   ALT 16   AST 20       All pertinent labs within the past 24 hours have been reviewed.    Significant Imaging: I have reviewed all pertinent imaging results/findings within the past 24 hours.

## 2022-12-07 PROBLEM — I48.91 ATRIAL FIBRILLATION WITH RVR: Status: ACTIVE | Noted: 2022-12-07

## 2022-12-07 LAB
ALBUMIN SERPL BCP-MCNC: 2.7 G/DL (ref 3.5–5.2)
ALP SERPL-CCNC: 53 U/L (ref 55–135)
ALT SERPL W/O P-5'-P-CCNC: 15 U/L (ref 10–44)
ANION GAP SERPL CALC-SCNC: 8 MMOL/L (ref 8–16)
AST SERPL-CCNC: 15 U/L (ref 10–40)
BASOPHILS # BLD AUTO: 0 K/UL (ref 0–0.2)
BASOPHILS NFR BLD: 0 % (ref 0–1.9)
BILIRUB SERPL-MCNC: 0.3 MG/DL (ref 0.1–1)
BUN SERPL-MCNC: 17 MG/DL (ref 6–20)
CALCIUM SERPL-MCNC: 9.7 MG/DL (ref 8.7–10.5)
CHLORIDE SERPL-SCNC: 101 MMOL/L (ref 95–110)
CO2 SERPL-SCNC: 27 MMOL/L (ref 23–29)
CREAT SERPL-MCNC: 0.8 MG/DL (ref 0.5–1.4)
DIFFERENTIAL METHOD: ABNORMAL
EOSINOPHIL # BLD AUTO: 0 K/UL (ref 0–0.5)
EOSINOPHIL NFR BLD: 0 % (ref 0–8)
ERYTHROCYTE [DISTWIDTH] IN BLOOD BY AUTOMATED COUNT: 17.5 % (ref 11.5–14.5)
EST. GFR  (NO RACE VARIABLE): >60 ML/MIN/1.73 M^2
GLUCOSE SERPL-MCNC: 112 MG/DL (ref 70–110)
HCT VFR BLD AUTO: 34.7 % (ref 40–54)
HGB BLD-MCNC: 10.9 G/DL (ref 14–18)
IMM GRANULOCYTES # BLD AUTO: 0.03 K/UL (ref 0–0.04)
IMM GRANULOCYTES NFR BLD AUTO: 0.3 % (ref 0–0.5)
LYMPHOCYTES # BLD AUTO: 0.8 K/UL (ref 1–4.8)
LYMPHOCYTES NFR BLD: 7.3 % (ref 18–48)
MAGNESIUM SERPL-MCNC: 1.8 MG/DL (ref 1.6–2.6)
MCH RBC QN AUTO: 25.3 PG (ref 27–31)
MCHC RBC AUTO-ENTMCNC: 31.4 G/DL (ref 32–36)
MCV RBC AUTO: 81 FL (ref 82–98)
MONOCYTES # BLD AUTO: 0.4 K/UL (ref 0.3–1)
MONOCYTES NFR BLD: 4 % (ref 4–15)
NEUTROPHILS # BLD AUTO: 9.4 K/UL (ref 1.8–7.7)
NEUTROPHILS NFR BLD: 88.4 % (ref 38–73)
NRBC BLD-RTO: 0 /100 WBC
PHOSPHATE SERPL-MCNC: 3.7 MG/DL (ref 2.7–4.5)
PLATELET # BLD AUTO: 318 K/UL (ref 150–450)
PMV BLD AUTO: 9.8 FL (ref 9.2–12.9)
POTASSIUM SERPL-SCNC: 4.7 MMOL/L (ref 3.5–5.1)
PROT SERPL-MCNC: 6.1 G/DL (ref 6–8.4)
RBC # BLD AUTO: 4.31 M/UL (ref 4.6–6.2)
SODIUM SERPL-SCNC: 136 MMOL/L (ref 136–145)
WBC # BLD AUTO: 10.62 K/UL (ref 3.9–12.7)

## 2022-12-07 PROCEDURE — 99900035 HC TECH TIME PER 15 MIN (STAT)

## 2022-12-07 PROCEDURE — 99233 PR SUBSEQUENT HOSPITAL CARE,LEVL III: ICD-10-PCS | Mod: GC,,, | Performed by: INTERNAL MEDICINE

## 2022-12-07 PROCEDURE — 84100 ASSAY OF PHOSPHORUS: CPT | Performed by: STUDENT IN AN ORGANIZED HEALTH CARE EDUCATION/TRAINING PROGRAM

## 2022-12-07 PROCEDURE — 63600175 PHARM REV CODE 636 W HCPCS: Performed by: STUDENT IN AN ORGANIZED HEALTH CARE EDUCATION/TRAINING PROGRAM

## 2022-12-07 PROCEDURE — 85025 COMPLETE CBC W/AUTO DIFF WBC: CPT | Performed by: STUDENT IN AN ORGANIZED HEALTH CARE EDUCATION/TRAINING PROGRAM

## 2022-12-07 PROCEDURE — 94640 AIRWAY INHALATION TREATMENT: CPT

## 2022-12-07 PROCEDURE — 25000003 PHARM REV CODE 250

## 2022-12-07 PROCEDURE — 25000242 PHARM REV CODE 250 ALT 637 W/ HCPCS: Performed by: STUDENT IN AN ORGANIZED HEALTH CARE EDUCATION/TRAINING PROGRAM

## 2022-12-07 PROCEDURE — 27000221 HC OXYGEN, UP TO 24 HOURS

## 2022-12-07 PROCEDURE — 94761 N-INVAS EAR/PLS OXIMETRY MLT: CPT

## 2022-12-07 PROCEDURE — 83735 ASSAY OF MAGNESIUM: CPT | Performed by: STUDENT IN AN ORGANIZED HEALTH CARE EDUCATION/TRAINING PROGRAM

## 2022-12-07 PROCEDURE — 25000003 PHARM REV CODE 250: Performed by: STUDENT IN AN ORGANIZED HEALTH CARE EDUCATION/TRAINING PROGRAM

## 2022-12-07 PROCEDURE — 97161 PT EVAL LOW COMPLEX 20 MIN: CPT

## 2022-12-07 PROCEDURE — 80053 COMPREHEN METABOLIC PANEL: CPT | Performed by: STUDENT IN AN ORGANIZED HEALTH CARE EDUCATION/TRAINING PROGRAM

## 2022-12-07 PROCEDURE — 20000000 HC ICU ROOM

## 2022-12-07 PROCEDURE — 99233 SBSQ HOSP IP/OBS HIGH 50: CPT | Mod: GC,,, | Performed by: INTERNAL MEDICINE

## 2022-12-07 PROCEDURE — 97165 OT EVAL LOW COMPLEX 30 MIN: CPT

## 2022-12-07 RX ORDER — METOPROLOL TARTRATE 1 MG/ML
INJECTION, SOLUTION INTRAVENOUS
Status: COMPLETED
Start: 2022-12-07 | End: 2022-12-07

## 2022-12-07 RX ORDER — METOPROLOL TARTRATE 25 MG/1
25 TABLET, FILM COATED ORAL ONCE
Status: DISCONTINUED | OUTPATIENT
Start: 2022-12-07 | End: 2022-12-07

## 2022-12-07 RX ORDER — METOPROLOL TARTRATE 25 MG/1
25 TABLET, FILM COATED ORAL 4 TIMES DAILY
Status: DISCONTINUED | OUTPATIENT
Start: 2022-12-07 | End: 2022-12-07

## 2022-12-07 RX ORDER — METOPROLOL TARTRATE 1 MG/ML
5 INJECTION, SOLUTION INTRAVENOUS ONCE
Status: COMPLETED | OUTPATIENT
Start: 2022-12-07 | End: 2022-12-07

## 2022-12-07 RX ORDER — DIGOXIN 125 MCG
0.38 TABLET ORAL ONCE
Status: DISCONTINUED | OUTPATIENT
Start: 2022-12-07 | End: 2022-12-07

## 2022-12-07 RX ORDER — METOPROLOL TARTRATE 50 MG/1
50 TABLET ORAL 3 TIMES DAILY
Status: DISCONTINUED | OUTPATIENT
Start: 2022-12-07 | End: 2022-12-07

## 2022-12-07 RX ORDER — DIGOXIN 0.25 MG/ML
500 INJECTION INTRAMUSCULAR; INTRAVENOUS ONCE
Status: COMPLETED | OUTPATIENT
Start: 2022-12-07 | End: 2022-12-07

## 2022-12-07 RX ORDER — MAGNESIUM SULFATE HEPTAHYDRATE 40 MG/ML
2 INJECTION, SOLUTION INTRAVENOUS ONCE
Status: COMPLETED | OUTPATIENT
Start: 2022-12-07 | End: 2022-12-07

## 2022-12-07 RX ORDER — DIGOXIN 125 MCG
0.12 TABLET ORAL DAILY
Status: DISCONTINUED | OUTPATIENT
Start: 2022-12-07 | End: 2022-12-07

## 2022-12-07 RX ORDER — DILTIAZEM HCL 1 MG/ML
0-15 INJECTION, SOLUTION INTRAVENOUS CONTINUOUS
Status: DISCONTINUED | OUTPATIENT
Start: 2022-12-07 | End: 2022-12-08

## 2022-12-07 RX ADMIN — DIGOXIN 0.12 MG: 125 TABLET ORAL at 09:12

## 2022-12-07 RX ADMIN — MAGNESIUM SULFATE 2 G: 2 INJECTION INTRAVENOUS at 08:12

## 2022-12-07 RX ADMIN — METOROPROLOL TARTRATE 5 MG: 5 INJECTION, SOLUTION INTRAVENOUS at 07:12

## 2022-12-07 RX ADMIN — APIXABAN 5 MG: 5 TABLET, FILM COATED ORAL at 08:12

## 2022-12-07 RX ADMIN — LEVALBUTEROL HYDROCHLORIDE 0.63 MG: 0.63 SOLUTION RESPIRATORY (INHALATION) at 07:12

## 2022-12-07 RX ADMIN — METOPROLOL TARTRATE 50 MG: 50 TABLET, FILM COATED ORAL at 07:12

## 2022-12-07 RX ADMIN — DIGOXIN 500 MCG: 0.25 INJECTION INTRAMUSCULAR; INTRAVENOUS at 01:12

## 2022-12-07 RX ADMIN — PREDNISONE 40 MG: 20 TABLET ORAL at 08:12

## 2022-12-07 RX ADMIN — METOPROLOL TARTRATE 5 MG: 1 INJECTION, SOLUTION INTRAVENOUS at 07:12

## 2022-12-07 RX ADMIN — LEVALBUTEROL HYDROCHLORIDE 0.63 MG: 0.63 SOLUTION RESPIRATORY (INHALATION) at 11:12

## 2022-12-07 RX ADMIN — FLUTICASONE FUROATE AND VILANTEROL TRIFENATATE 1 PUFF: 100; 25 POWDER RESPIRATORY (INHALATION) at 07:12

## 2022-12-07 RX ADMIN — METOPROLOL TARTRATE 25 MG: 25 TABLET, FILM COATED ORAL at 06:12

## 2022-12-07 RX ADMIN — LEVALBUTEROL HYDROCHLORIDE 0.63 MG: 0.63 SOLUTION RESPIRATORY (INHALATION) at 12:12

## 2022-12-07 RX ADMIN — TIOTROPIUM BROMIDE INHALATION SPRAY 2 PUFF: 3.12 SPRAY, METERED RESPIRATORY (INHALATION) at 07:12

## 2022-12-07 RX ADMIN — METOPROLOL TARTRATE 50 MG: 50 TABLET, FILM COATED ORAL at 03:12

## 2022-12-07 RX ADMIN — DILTIAZEM HYDROCHLORIDE 5 MG/HR: 5 INJECTION INTRAVENOUS at 04:12

## 2022-12-07 RX ADMIN — Medication 100 MG: at 08:12

## 2022-12-07 RX ADMIN — THERA TABS 1 TABLET: TAB at 08:12

## 2022-12-07 RX ADMIN — FOLIC ACID 1 MG: 1 TABLET ORAL at 08:12

## 2022-12-07 NOTE — PLAN OF CARE
PT daniel complete, plan of care established    2022    Problem: Physical Therapy  Goal: Physical Therapy Goal  Description: Goals to be met by: 2022     Patient will increase functional independence with mobility by performin. Gait  x 100 feet with modified independence using LRAD as needed   Outcome: Ongoing, Progressing

## 2022-12-07 NOTE — SUBJECTIVE & OBJECTIVE
Interval History/Significant Events: Episodes of afib with RVR with HR up to 150 bpm.    Review of Systems   Constitutional:  Negative for chills, fatigue and fever.   HENT:  Negative for congestion and sore throat.    Eyes:  Negative for visual disturbance.   Respiratory:  Positive for cough and shortness of breath. Negative for wheezing.    Cardiovascular:  Positive for palpitations. Negative for chest pain and leg swelling.   Gastrointestinal:  Negative for abdominal pain, diarrhea, nausea and vomiting.   Endocrine: Negative for polyuria.   Genitourinary:  Negative for dysuria, flank pain and frequency.   Musculoskeletal:  Negative for arthralgias, back pain and myalgias.   Skin:  Negative for pallor and rash.   Neurological:  Negative for light-headedness and headaches.   Psychiatric/Behavioral:  Positive for sleep disturbance.    Objective:     Vital Signs (Most Recent):  Temp: 97.8 °F (36.6 °C) (12/07/22 0700)  Pulse: (!) 119 (12/07/22 1100)  Resp: (!) 34 (12/07/22 1100)  BP: 123/87 (12/07/22 1100)  SpO2: 98 % (12/07/22 1100)   Vital Signs (24h Range):  Temp:  [97.2 °F (36.2 °C)-98.7 °F (37.1 °C)] 97.8 °F (36.6 °C)  Pulse:  [] 119  Resp:  [19-41] 34  SpO2:  [90 %-100 %] 98 %  BP: ()/() 123/87   Weight: 57.2 kg (126 lb 1.7 oz)  Body mass index is 19.17 kg/m².      Intake/Output Summary (Last 24 hours) at 12/7/2022 1210  Last data filed at 12/7/2022 1019  Gross per 24 hour   Intake 696 ml   Output 700 ml   Net -4 ml       Physical Exam  Vitals and nursing note reviewed.   Constitutional:       Appearance: Normal appearance.      Comments: Patient sitting in bed with NC in place.   HENT:      Head: Normocephalic and atraumatic.      Mouth/Throat:      Mouth: Mucous membranes are moist.      Pharynx: No oropharyngeal exudate.   Eyes:      Extraocular Movements: Extraocular movements intact.      Pupils: Pupils are equal, round, and reactive to light.   Cardiovascular:      Rate and Rhythm:  Regular rhythm. Tachycardia present.      Heart sounds: No murmur heard.     Comments: No JVD. HR is in 120's.  Pulmonary:      Effort: No respiratory distress.      Breath sounds: No wheezing or rales.   Abdominal:      General: Abdomen is flat. Bowel sounds are normal. There is no distension.      Tenderness: There is no abdominal tenderness. There is no guarding.   Musculoskeletal:         General: No swelling or tenderness. Normal range of motion.      Cervical back: Normal range of motion.      Right lower leg: No edema.      Left lower leg: No edema.   Lymphadenopathy:      Cervical: No cervical adenopathy.   Skin:     General: Skin is warm.      Coloration: Skin is not jaundiced.      Findings: No bruising or rash.   Neurological:      General: No focal deficit present.      Mental Status: He is alert and oriented to person, place, and time.      Cranial Nerves: No cranial nerve deficit.   Psychiatric:         Mood and Affect: Mood normal.       Vents:  Oxygen Concentration (%): 40 (12/07/22 0710)  Lines/Drains/Airways       Peripheral Intravenous Line  Duration                  Peripheral IV - Single Lumen 12/06/22 0200 20 G Anterior;Right Forearm 1 day         Peripheral IV - Single Lumen 12/06/22 20 G Left Antecubital 1 day                  Significant Labs:    CBC/Anemia Profile:  Recent Labs   Lab 12/05/22 2004 12/06/22 0147 12/07/22  0351   WBC 8.23 6.45 10.62   HGB 13.2* 12.3* 10.9*   HCT 42.2 39.6* 34.7*    303 318   MCV 80* 79* 81*   RDW 18.0* 17.7* 17.5*        Chemistries:  Recent Labs   Lab 12/05/22 2004 12/06/22 0147 12/07/22  0351    136 136   K 4.5 4.2 4.7   CL 97 98 101   CO2 33* 26 27   BUN 7 9 17   CREATININE 0.9 0.9 0.8   CALCIUM 9.8 9.4 9.7   ALBUMIN 3.0* 2.9* 2.7*   PROT 7.1 6.9 6.1   BILITOT 0.5 0.6 0.3   ALKPHOS 68 64 53*   ALT 16 15 15   AST 20 17 15   MG  --  1.7 1.8   PHOS  --  3.6 3.7       CMP:   Recent Labs   Lab 12/05/22 2004 12/06/22 0147 12/07/22  0351   NA  139 136 136   K 4.5 4.2 4.7   CL 97 98 101   CO2 33* 26 27   * 175* 112*   BUN 7 9 17   CREATININE 0.9 0.9 0.8   CALCIUM 9.8 9.4 9.7   PROT 7.1 6.9 6.1   ALBUMIN 3.0* 2.9* 2.7*   BILITOT 0.5 0.6 0.3   ALKPHOS 68 64 53*   AST 20 17 15   ALT 16 15 15   ANIONGAP 9 12 8       Significant Imaging:  I have reviewed all pertinent imaging results/findings within the past 24 hours.

## 2022-12-07 NOTE — NURSING
Patient in a-fib RVR w/ HR reaching >150 bpm. MAR reviewed, and MD Blas notified that this is persisting despite PO metoprolol 1 hr ago. Hemodynamically stable. AAOx4. See new orders.

## 2022-12-07 NOTE — CONSULTS
[facility] - Cardiac Medical ICU  Cardiology  Consult Note    Patient Name: Baltazar Mccray  MRN: [id number]  Admission Date: 12/5/2022  Hospital Length of Stay: 2 days  Code Status: Full Code   Attending Provider: Tarah Patel MD   Consulting Provider: Brown Villeda MD  Primary Care Physician: No primary care provider on file.  Principal Problem:Acute on chronic respiratory failure with hypoxia and hypercapnia    Patient information was obtained from patient, past medical records, ER records and primary team.     Consults  Subjective:     Chief Complaint:  SOB     HPI:   62 y.o. male with COPD on  2LNC home O2, Atrial Fibrillation, HTN, HFpEF (EF 53%), seizure disorder, gastric ulcer who presented to the ED with shortness of breath and cough for 3 days despite increased use of home inhalers and neb treatments. He arrived to ED w EMS, who reportedly noted him to be satting 90% on his usual 2L. Was noted to be tachycardic with EKG concerning for AF RVR so was given 10mg of IV diltiazem in the ED. Since admission, he has been treated for COPD exacerbation with IV methylpred, duonebs, prednisone, breo, and levalbuterol. He required BIPAP for acute on chronic respiratory failure and was admitted to MICU. Throughout his admission he has been in Afib with RVR. Attempts to control rate have been made with diltiazem drip, increasing doses of Lopressor, and digoxin (125 mcg followed by 500 mcg) with patient continuing to be in Afib with rate in 120-140s. He was also started on Eliquis on 12/6 for prevention of thromboembolism with Afib.     Patient reports palpitations and SOB as his main symptoms. He can barely sit up to eat or go to the bathroom without feeling short of breath. He denies chest pain, LE edema, nausea, vomiting, diaphoresis, bloody stools. He has a hx of tobacco use, stating that he quit smoking 3-4 months ago. Also has hx of ETOH abuse, using it to fall asleep. He denies having any prior knowledge of his  Afib, previous cardioversion, or being on anticoagulation before despite previous notes dating back as far as Jan 2022 documenting possible new onset Afib with RVR. Echo from 11/21/22 shows EF of 53 (recovered from previous EF of 10% in March), LV and septal WMA. Troponins this admission were 0.161--> 0.132--> 0.097. . EKG from 12/5 reveals Afib with rate 177. Cardiology consulted for Afib management and possible DCCV.       Past Medical History:   Diagnosis Date    Aspiration pneumonia 5/30/2021    Asthma     Atrial fibrillation with rapid ventricular response     CHF (congestive heart failure)     COPD (chronic obstructive pulmonary disease)     home O2 at night only    Coronary artery disease     Hypertension     Lung nodule     Pneumonia     Seizures        Past Surgical History:   Procedure Laterality Date    ESOPHAGOGASTRODUODENOSCOPY N/A 2/11/2022    Procedure: EGD (ESOPHAGOGASTRODUODENOSCOPY);  Surgeon: Cain Ortega MD;  Location: [facility] ([address] [place] [address]);  Service: Endoscopy;  Laterality: N/A;    ESOPHAGOGASTRODUODENOSCOPY N/A 9/15/2022    Procedure: EGD (ESOPHAGOGASTRODUODENOSCOPY);  Surgeon: Leonid Chavez MD;  Location: [facility] ([address] [place] [address]);  Service: Endoscopy;  Laterality: N/A;  PA-50 - 2nd floor  vacc-wears 2L o2-inst mail and verbal-clears 4 hrs prior-tb  8/30 pt rescheduled; updated instructions mailed-    LEFT HEART CATHETERIZATION  4/23/2020    Procedure: Left heart cath;  Surgeon: Nic Pollack MD;  Location: [facility] CATH LAB;  Service: Cardiology;;       Review of patient's allergies indicates:   Allergen Reactions    Benazepril Swelling       No current facility-administered medications on file prior to encounter.     Current Outpatient Medications on File Prior to Encounter   Medication Sig    albuterol (PROVENTIL/VENTOLIN HFA) 90 mcg/actuation inhaler Inhale 1-2 puffs into the lungs every 6 (six) hours as needed for Wheezing. Rescue    budesonide-glycopyr-formoterol  (BREZTRI AEROSPHERE) 160-9-4.8 mcg/actuation HFAA Inhale 2 puffs into the lungs 2 (two) times daily.    NIFEdipine (PROCARDIA-XL) 30 MG (OSM) 24 hr tablet Take 1 tablet (30 mg total) by mouth once daily.    albuterol-ipratropium (DUO-NEB) 2.5 mg-0.5 mg/3 mL nebulizer solution Use 1 vial (3 mLs) by nebulization every 4 (four) hours as needed for Wheezing or Shortness of Breath. Rescue    bismuth subsalicylate (PEPTO BISMOL) 262 mg/15 mL suspension Take 30 mLs by mouth daily as needed for Indigestion.    calcium carbonate (TUMS ORAL) Take 2 tablets by mouth daily as needed (Heartburn).    ferrous sulfate 325 (65 FE) MG EC tablet Take 1 tablet (325 mg total) by mouth once daily.    folic acid (FOLVITE) 1 MG tablet Take 1 tablet (1 mg total) by mouth once daily.    furosemide (LASIX) 20 MG tablet Take 2 tablets (40 mg total) by mouth once daily.    multivitamin (THERAGRAN) tablet Take 1 tablet by mouth once daily.    nicotine (NICODERM CQ) 7 mg/24 hr Place 1 patch onto the skin once daily.    thiamine 100 MG tablet Take 1 tablet (100 mg total) by mouth once daily.    [DISCONTINUED] tiotropium bromide (SPIRIVA RESPIMAT) 2.5 mcg/actuation inhaler Inhale 2 puffs into the lungs Daily. Controller     Family History       Problem Relation (Age of Onset)    Cancer Father    Hypertension Sister    Stroke Sister, Brother          Tobacco Use    Smoking status: Some Days     Packs/day: 0.25     Types: Cigarettes    Smokeless tobacco: Never    Tobacco comments:     1-2 cigarettes per day   Substance and Sexual Activity    Alcohol use: Yes     Alcohol/week: 2.0 standard drinks     Types: 2 Cans of beer per week     Comment: every night    Drug use: No    Sexual activity: Not Currently     Review of Systems   Cardiovascular:  Positive for irregular heartbeat and palpitations. Negative for chest pain, leg swelling, orthopnea and syncope.   Respiratory:  Positive for cough and shortness of breath.    Gastrointestinal:   Negative for hematochezia, nausea and vomiting.   Objective:     Vital Signs (Most Recent):  Temp: 98.8 °F (37.1 °C) (12/07/22 1700)  Pulse: 93 (12/07/22 1700)  Resp: (!) 29 (12/07/22 1700)  BP: 139/89 (12/07/22 1700)  SpO2: (!) 91 % (12/07/22 1700)   Vital Signs (24h Range):  Temp:  [97.8 °F (36.6 °C)-98.8 °F (37.1 °C)] 98.8 °F (37.1 °C)  Pulse:  [] 93  Resp:  [19-41] 29  SpO2:  [90 %-100 %] 91 %  BP: ()/() 139/89     Weight: 57.2 kg (126 lb 1.7 oz)  Body mass index is 19.17 kg/m².    SpO2: (!) 91 %  O2 Device (Oxygen Therapy): nasal cannula w/ humidification      Intake/Output Summary (Last 24 hours) at 12/7/2022 1741  Last data filed at 12/7/2022 1019  Gross per 24 hour   Intake 460 ml   Output 700 ml   Net -240 ml       Lines/Drains/Airways       Peripheral Intravenous Line  Duration                  Peripheral IV - Single Lumen 12/06/22 0200 20 G Anterior;Right Forearm 1 day         Peripheral IV - Single Lumen 12/07/22 1615 18 G Anterior;Left Forearm <1 day                    Physical Exam  Constitutional:       General: He is not in acute distress.     Appearance: Normal appearance. He is not ill-appearing.   HENT:      Head: Normocephalic and atraumatic.   Eyes:      General: No scleral icterus.  Cardiovascular:      Rate and Rhythm: Tachycardia present. Rhythm irregular.      Pulses: Normal pulses.   Pulmonary:      Effort: Pulmonary effort is normal. No respiratory distress.      Breath sounds: Wheezing present. No rales.   Abdominal:      General: Abdomen is flat.   Musculoskeletal:      Cervical back: Normal range of motion. No rigidity.      Right lower leg: No edema.      Left lower leg: No edema.   Skin:     General: Skin is warm and dry.      Coloration: Skin is not jaundiced.   Neurological:      General: No focal deficit present.      Mental Status: He is alert.   Psychiatric:         Mood and Affect: Mood normal.         Behavior: Behavior normal.       Significant Labs: CMP    Recent Labs   Lab 12/05/22 2004 12/06/22 0147 12/07/22  0351    136 136   K 4.5 4.2 4.7   CL 97 98 101   CO2 33* 26 27   * 175* 112*   BUN 7 9 17   CREATININE 0.9 0.9 0.8   CALCIUM 9.8 9.4 9.7   PROT 7.1 6.9 6.1   ALBUMIN 3.0* 2.9* 2.7*   BILITOT 0.5 0.6 0.3   ALKPHOS 68 64 53*   AST 20 17 15   ALT 16 15 15   ANIONGAP 9 12 8    and CBC   Recent Labs   Lab 12/05/22 2004 12/06/22 0147 12/07/22  0351   WBC 8.23 6.45 10.62   HGB 13.2* 12.3* 10.9*   HCT 42.2 39.6* 34.7*    303 318       Significant Imaging: Echocardiogram: Transthoracic echo (TTE) complete (Cupid Only):   Results for orders placed or performed during the hospital encounter of 11/18/22   Echo   Result Value Ref Range    AV mean gradient 2 mmHg    Ao peak alli 0.04 m/s    Ao VTI 19.30 cm    IVS 0.57 (A) 0.6 - 1.1 cm    LA size 2.92 cm    Left Atrium Major Axis 3.20 cm    Left Atrium Minor Axis 4.64 cm    LVIDd 4.64 3.5 - 6.0 cm    LVIDs 3.38 2.1 - 4.0 cm    LVOT diameter 2.01 cm    LVOT peak VTI 13.14 cm    Posterior Wall 0.68 0.6 - 1.1 cm    MV Peak A Alli 0.76 m/s    E wave deceleration time 237.70 msec    MV Peak E Alli 0.64 m/s    RA Major Axis 3.00 cm    RA Width 3.15 cm    RVDD 4.12 cm    Sinus 3.04 cm    TAPSE 2.21 cm    TDI LATERAL 0.10 m/s    TDI SEPTAL 0.30 m/s    LA WIDTH 3.36 cm    MV stenosis pressure 1/2 time 68.93 ms    LV Diastolic Volume 99.22 mL    LV Systolic Volume 46.72 mL    LVOT peak alli 0.64 m/s    LA volume (mod) 27.39 cm3    LV LATERAL E/E' RATIO 6.40 m/s    LV SEPTAL E/E' RATIO 2.13 m/s    FS 27 %    LA volume 31.59 cm3    LV mass 87.49 g    Left Ventricle Relative Wall Thickness 0.29 cm    AV valve area 2.16 cm2    AV Velocity Ratio 16.00     AV index (prosthetic) 0.68     MV valve area p 1/2 method 3.19 cm2    E/A ratio 0.84     Mean e' 0.20 m/s    LVOT area 3.2 cm2    LVOT stroke volume 41.67 cm3    AV peak gradient 0 mmHg    E/E' ratio 3.20 m/s    BSA 1.65 m2    LV Systolic Volume Index 27.8 mL/m2    LV  Diastolic Volume Index 59.06 mL/m2    LA Volume Index 18.8 mL/m2    LV Mass Index 52 g/m2    LA Volume Index (Mod) 16.3 mL/m2    Right Atrial Pressure (from IVC) 3 mmHg    EF 53 %    Narrative    · The left ventricle is normal in size with normal systolic function.  · The estimated ejection fraction is 53%.  · There are segmental left ventricular wall motion abnormalities.  · Normal left ventricular diastolic function.  · There is abnormal septal wall motion.  · Normal right ventricular size with normal right ventricular systolic   function.  · Normal central venous pressure (3 mmHg).  · There is a small right pleural effusion.        Assessment and Plan:     Atrial fibrillation with RVR  62 y.o. male with COPD on  2LNC home O2, Atrial Fibrillation (first documented in Jan 2022), HTN, HFpEF (EF 53%), seizure disorder, gastric ulcer who presented to the ED with shortness of breath and cough for 3 days despite increased use of home inhalers and neb treatments. Attempts to control rate have been made with diltiazem drip, increasing doses of Lopressor, and digoxin (125 mcg followed by 500 mcg) with patient continuing to be in Afib with rate in 120-140s. He was also started on Eliquis on 12/6 for prevention of thromboembolism with Afib.     Echo from 11/21/22 shows EF of 53 (recovered from previous EF of 10% in March), LV and septal WMA. Troponins this admission were 0.161--> 0.132--> 0.097. EKG from 12/5 reveals Afib with rate 177. Cardiology consulted for Afib management and possible DCCV. Afib likely exacerbated by excess albuterol and duoneb use at home for COPD and alcohol use.     Current rhythm: Afib with RVR  Home meds: none  [id number] 2 (HTN, CHF)  HAS-BLED 2  TTE: EF 53%    Plan:  - Control with diltiazem bolus and diltiazem drip starting at 10 mg/hr and titrate up as needed and as BP will tolerate  - Anticoagulation with eliquis  - Telemetry  - Maintain K > 4, Mg > 2, Ca wnl    - NPO at midnight for ALESSANDRO/DCCV  tomorrow  - counseled on alcohol cessation and cautioned about excess caffeine intake           VTE Risk Mitigation (From admission, onward)         Ordered     apixaban tablet 5 mg  2 times daily         12/06/22 0914     IP VTE HIGH RISK PATIENT  Once         12/05/22 2242     Place sequential compression device  Until discontinued         12/05/22 2242                Thank you for your consult. I will follow-up with patient. Please contact us if you have any additional questions.    Brown Villeda MD  Cardiology   [facility] - Cardiac Medical ICU

## 2022-12-07 NOTE — ASSESSMENT & PLAN NOTE
62M hx of COPD on 2L, Atrial Fibrillation, HFpEF (EF 53%) here with increased dyspnea in the setting of inhaler use at home and found to be in AF RVR on admission with rates up to 170bpm. Hemodynamically stable on admission. He was given IV cardizem, nebs at ED.    -Continue Metoprolol 25mg PO TID. Start Digoxin 0.125 mg PO QD.  -Contuinue Apixaban 5mg PO BID.  -telemetry  -Mg >2, K >4

## 2022-12-07 NOTE — PT/OT/SLP EVAL
"Physical Therapy Co-Evaluation     Patient Name:  Baltazar Mccray   MRN:  [id number]    Co-evaluation performed for this visit due to suspected patient need for two skilled therapists to ensure patient and staff safety and to accommodate for patient activity tolerance/pain management   Recommendations:     Discharge Recommendations:  home   Discharge Equipment Recommendations: none   Barriers to discharge: None    Assessment:     Baltazar Mccray is a 62 y.o. male admitted with a medical diagnosis of Acute on chronic respiratory failure with hypoxia and hypercapnia. He presents with the following impairments/functional limitations: impaired cardiopulmonary response to activity, impaired endurance. Patient tolerated session well however self-limited mobility secondary to shortness of breath with mobility.    Rehab Prognosis: Fair; patient would benefit from acute skilled PT services 2 x/week to address these deficits and reach maximum level of function.  Recent Surgery: * No surgery found *      Plan:     During this hospitalization, patient to be seen 2 x/week to address the identified rehab impairments via gait training, therapeutic activities, therapeutic exercises and progress toward the following goals:    Plan of Care Expires:  01/07/23    Subjective     Chief Complaint: Shortness of breath with mobility  Patient/Family Comments/Goals: "I can do everything by myself. My only problem is my breathing"  Pain/Comfort:  Pain Rating 1: 0/10    Patients cultural, spiritual, Methodist conflicts given the current situation: no    Living Environment:  Patient lives alone in a first floor apartment, number of outside stairs: 0, walk-in shower with built in shower chair and grab bar.  Prior to admission, patient was independent with ADLs, not driving, not working. Patient uses DME as follows: shower chair, grab bar, oxygen. DME owned (not currently used): none. Upon discharge, patient will have assistance from family (check in on " him daily).    Objective:     Communicated with nursing prior to session.  Patient found HOB elevated with telemetry, pulse ox (continuous), blood pressure cuff, oxygen, peripheral IV upon PT entry to room.    General Precautions: Standard, fall   Orthopedic Precautions:N/A   Braces: N/A    Exams:  Cognitive Exam:  Patient is oriented to Person, Place, Time, Situation, follows commands 100% of the time  RLE ROM: WFL  RLE Strength: WFL  LLE ROM: WFL  LLE Strength: WFL  Sensation: Intact light touch to BLEs    Functional Mobility:  Bed Mobility:     Supine to Sit: modified independence  Sit to Supine: modified independence  Transfers:     Sit to Stand: supervision with no AD  Gait: Patient ambulated 2 steps to the right with no AD and supervision. Patient demonstrates steady gait and decreased step length. All lines remained intact throughout ambulation trial.  Balance:   Static Sitting: Good, able to maintain for 5 minute(s) with independence  Dynamic Sitting: Good: Patient accepts moderate challenge, independence  Static Standing: Good, able to maintain for 30 seconds with supervision  Dynamic Standing: Fair: Patient accepts minimal challenge, supervision    Therapeutic Activities and Exercises:  Patient educated on role of acute care PT and PT POC  Patient is clear to ambulate to/from bathroom with RN/PCT, assist x1    AM-PAC 6 CLICK MOBILITY  Total Score:20     Patient left HOB elevated with all lines intact, call button in reach, and RN notified.    GOALS:   Multidisciplinary Problems       Physical Therapy Goals          Problem: Physical Therapy    Goal Priority Disciplines Outcome Goal Variances Interventions   Physical Therapy Goal     PT, PT/OT Ongoing, Progressing     Description: Goals to be met by: 2022     Patient will increase functional independence with mobility by performin. Gait  x 100 feet with modified independence using LRAD as needed                        History:     Past Medical  History:   Diagnosis Date    Aspiration pneumonia 5/30/2021    Asthma     Atrial fibrillation with rapid ventricular response     CHF (congestive heart failure)     COPD (chronic obstructive pulmonary disease)     home O2 at night only    Coronary artery disease     Hypertension     Lung nodule     Pneumonia     Seizures        Past Surgical History:   Procedure Laterality Date    ESOPHAGOGASTRODUODENOSCOPY N/A 2/11/2022    Procedure: EGD (ESOPHAGOGASTRODUODENOSCOPY);  Surgeon: Cain Ortega MD;  Location: [facility] ([address] [place] [address]);  Service: Endoscopy;  Laterality: N/A;    ESOPHAGOGASTRODUODENOSCOPY N/A 9/15/2022    Procedure: EGD (ESOPHAGOGASTRODUODENOSCOPY);  Surgeon: Leonid Chavez MD;  Location: [facility] ([address] [place] [address]);  Service: Endoscopy;  Laterality: N/A;  PA-50 - 2nd floor  vacc-wears 2L o2-inst mail and verbal-clears 4 hrs prior-tb  8/30 pt rescheduled; updated instructions mailed-st    LEFT HEART CATHETERIZATION  4/23/2020    Procedure: Left heart cath;  Surgeon: Nic Pollack MD;  Location: [facility] CATH LAB;  Service: Cardiology;;       Time Tracking:     PT Received On: 12/07/22  PT Start Time: 0948     PT Stop Time: 0958  PT Total Time (min): 10 min     Billable Minutes: Evaluation 10 min     12/07/2022

## 2022-12-07 NOTE — SUBJECTIVE & OBJECTIVE
Past Medical History:   Diagnosis Date    Aspiration pneumonia 5/30/2021    Asthma     Atrial fibrillation with rapid ventricular response     CHF (congestive heart failure)     COPD (chronic obstructive pulmonary disease)     home O2 at night only    Coronary artery disease     Hypertension     Lung nodule     Pneumonia     Seizures        Past Surgical History:   Procedure Laterality Date    ESOPHAGOGASTRODUODENOSCOPY N/A 2/11/2022    Procedure: EGD (ESOPHAGOGASTRODUODENOSCOPY);  Surgeon: Cain Ortega MD;  Location: [facility] ENDO (2ND FLR);  Service: Endoscopy;  Laterality: N/A;    ESOPHAGOGASTRODUODENOSCOPY N/A 9/15/2022    Procedure: EGD (ESOPHAGOGASTRODUODENOSCOPY);  Surgeon: Leonid Chavez MD;  Location: [facility] ENDO (2ND FLR);  Service: Endoscopy;  Laterality: N/A;  PA-50 - 2nd floor  vacc-wears 2L o2-inst mail and verbal-clears 4 hrs prior-tb  8/30 pt rescheduled; updated instructions mailed-st    LEFT HEART CATHETERIZATION  4/23/2020    Procedure: Left heart cath;  Surgeon: Nic Pollack MD;  Location: [facility] CATH LAB;  Service: Cardiology;;       Review of patient's allergies indicates:   Allergen Reactions    Benazepril Swelling       No current facility-administered medications on file prior to encounter.     Current Outpatient Medications on File Prior to Encounter   Medication Sig    albuterol (PROVENTIL/VENTOLIN HFA) 90 mcg/actuation inhaler Inhale 1-2 puffs into the lungs every 6 (six) hours as needed for Wheezing. Rescue    budesonide-glycopyr-formoterol (BREZTRI AEROSPHERE) 160-9-4.8 mcg/actuation HFAA Inhale 2 puffs into the lungs 2 (two) times daily.    NIFEdipine (PROCARDIA-XL) 30 MG (OSM) 24 hr tablet Take 1 tablet (30 mg total) by mouth once daily.    albuterol-ipratropium (DUO-NEB) 2.5 mg-0.5 mg/3 mL nebulizer solution Use 1 vial (3 mLs) by nebulization every 4 (four) hours as needed for Wheezing or Shortness of Breath. Rescue    bismuth subsalicylate (PEPTO BISMOL) 262 mg/15 mL suspension  Take 30 mLs by mouth daily as needed for Indigestion.    calcium carbonate (TUMS ORAL) Take 2 tablets by mouth daily as needed (Heartburn).    ferrous sulfate 325 (65 FE) MG EC tablet Take 1 tablet (325 mg total) by mouth once daily.    folic acid (FOLVITE) 1 MG tablet Take 1 tablet (1 mg total) by mouth once daily.    furosemide (LASIX) 20 MG tablet Take 2 tablets (40 mg total) by mouth once daily.    multivitamin (THERAGRAN) tablet Take 1 tablet by mouth once daily.    nicotine (NICODERM CQ) 7 mg/24 hr Place 1 patch onto the skin once daily.    thiamine 100 MG tablet Take 1 tablet (100 mg total) by mouth once daily.    [DISCONTINUED] tiotropium bromide (SPIRIVA RESPIMAT) 2.5 mcg/actuation inhaler Inhale 2 puffs into the lungs Daily. Controller     Family History       Problem Relation (Age of Onset)    Cancer Father    Hypertension Sister    Stroke Sister, Brother          Tobacco Use    Smoking status: Some Days     Packs/day: 0.25     Types: Cigarettes    Smokeless tobacco: Never    Tobacco comments:     1-2 cigarettes per day   Substance and Sexual Activity    Alcohol use: Yes     Alcohol/week: 2.0 standard drinks     Types: 2 Cans of beer per week     Comment: every night    Drug use: No    Sexual activity: Not Currently     Review of Systems   Cardiovascular:  Positive for irregular heartbeat and palpitations. Negative for chest pain, leg swelling, orthopnea and syncope.   Respiratory:  Positive for cough and shortness of breath.    Gastrointestinal:  Negative for hematochezia, nausea and vomiting.   Objective:     Vital Signs (Most Recent):  Temp: 98.8 °F (37.1 °C) (12/07/22 1700)  Pulse: 93 (12/07/22 1700)  Resp: (!) 29 (12/07/22 1700)  BP: 139/89 (12/07/22 1700)  SpO2: (!) 91 % (12/07/22 1700)   Vital Signs (24h Range):  Temp:  [97.8 °F (36.6 °C)-98.8 °F (37.1 °C)] 98.8 °F (37.1 °C)  Pulse:  [] 93  Resp:  [19-41] 29  SpO2:  [90 %-100 %] 91 %  BP: ()/() 139/89     Weight: 57.2 kg (126 lb  1.7 oz)  Body mass index is 19.17 kg/m².    SpO2: (!) 91 %  O2 Device (Oxygen Therapy): nasal cannula w/ humidification      Intake/Output Summary (Last 24 hours) at 12/7/2022 1741  Last data filed at 12/7/2022 1019  Gross per 24 hour   Intake 460 ml   Output 700 ml   Net -240 ml       Lines/Drains/Airways       Peripheral Intravenous Line  Duration                  Peripheral IV - Single Lumen 12/06/22 0200 20 G Anterior;Right Forearm 1 day         Peripheral IV - Single Lumen 12/07/22 1615 18 G Anterior;Left Forearm <1 day                    Physical Exam  Constitutional:       General: He is not in acute distress.     Appearance: Normal appearance. He is not ill-appearing.   HENT:      Head: Normocephalic and atraumatic.   Eyes:      General: No scleral icterus.  Cardiovascular:      Rate and Rhythm: Tachycardia present. Rhythm irregular.      Pulses: Normal pulses.   Pulmonary:      Effort: Pulmonary effort is normal. No respiratory distress.      Breath sounds: Wheezing present. No rales.   Abdominal:      General: Abdomen is flat.   Musculoskeletal:      Cervical back: Normal range of motion. No rigidity.      Right lower leg: No edema.      Left lower leg: No edema.   Skin:     General: Skin is warm and dry.      Coloration: Skin is not jaundiced.   Neurological:      General: No focal deficit present.      Mental Status: He is alert.   Psychiatric:         Mood and Affect: Mood normal.         Behavior: Behavior normal.       Significant Labs: CMP   Recent Labs   Lab 12/05/22 2004 12/06/22 0147 12/07/22  0351    136 136   K 4.5 4.2 4.7   CL 97 98 101   CO2 33* 26 27   * 175* 112*   BUN 7 9 17   CREATININE 0.9 0.9 0.8   CALCIUM 9.8 9.4 9.7   PROT 7.1 6.9 6.1   ALBUMIN 3.0* 2.9* 2.7*   BILITOT 0.5 0.6 0.3   ALKPHOS 68 64 53*   AST 20 17 15   ALT 16 15 15   ANIONGAP 9 12 8    and CBC   Recent Labs   Lab 12/05/22 2004 12/06/22 0147 12/07/22  0351   WBC 8.23 6.45 10.62   HGB 13.2* 12.3* 10.9*    HCT 42.2 39.6* 34.7*    303 318       Significant Imaging: Echocardiogram: Transthoracic echo (TTE) complete ([facility]upid Only):   Results for orders placed or performed during the hospital encounter of 11/18/22   Echo   Result Value Ref Range    AV mean gradient 2 mmHg    Ao peak alli 0.04 m/s    Ao VTI 19.30 cm    IVS 0.57 (A) 0.6 - 1.1 cm    LA size 2.92 cm    Left Atrium Major Axis 3.20 cm    Left Atrium Minor Axis 4.64 cm    LVIDd 4.64 3.5 - 6.0 cm    LVIDs 3.38 2.1 - 4.0 cm    LVOT diameter 2.01 cm    LVOT peak VTI 13.14 cm    Posterior Wall 0.68 0.6 - 1.1 cm    MV Peak A Alli 0.76 m/s    E wave deceleration time 237.70 msec    MV Peak E Alli 0.64 m/s    RA Major Axis 3.00 cm    RA Width 3.15 cm    RVDD 4.12 cm    Sinus 3.04 cm    TAPSE 2.21 cm    TDI LATERAL 0.10 m/s    TDI SEPTAL 0.30 m/s    LA WIDTH 3.36 cm    MV stenosis pressure 1/2 time 68.93 ms    LV Diastolic Volume 99.22 mL    LV Systolic Volume 46.72 mL    LVOT peak alli 0.64 m/s    LA volume (mod) 27.39 cm3    LV LATERAL E/E' RATIO 6.40 m/s    LV SEPTAL E/E' RATIO 2.13 m/s    FS 27 %    LA volume 31.59 cm3    LV mass 87.49 g    Left Ventricle Relative Wall Thickness 0.29 cm    AV valve area 2.16 cm2    AV Velocity Ratio 16.00     AV index (prosthetic) 0.68     MV valve area p 1/2 method 3.19 cm2    E/A ratio 0.84     Mean e' 0.20 m/s    LVOT area 3.2 cm2    LVOT stroke volume 41.67 cm3    AV peak gradient 0 mmHg    E/E' ratio 3.20 m/s    BSA 1.65 m2    LV Systolic Volume Index 27.8 mL/m2    LV Diastolic Volume Index 59.06 mL/m2    LA Volume Index 18.8 mL/m2    LV Mass Index 52 g/m2    LA Volume Index (Mod) 16.3 mL/m2    Right Atrial Pressure (from IVC) 3 mmHg    EF 53 %    Narrative    · The left ventricle is normal in size with normal systolic function.  · The estimated ejection fraction is 53%.  · There are segmental left ventricular wall motion abnormalities.  · Normal left ventricular diastolic function.  · There is abnormal septal wall  motion.  · Normal right ventricular size with normal right ventricular systolic   function.  · Normal central venous pressure (3 mmHg).  · There is a small right pleural effusion.

## 2022-12-07 NOTE — ASSESSMENT & PLAN NOTE
62 y.o. male with COPD on  2LNC home O2, Atrial Fibrillation (first documented in Jan 2022), HTN, HFpEF (EF 53%), seizure disorder, gastric ulcer who presented to the ED with shortness of breath and cough for 3 days despite increased use of home inhalers and neb treatments. Attempts to control rate have been made with diltiazem drip, increasing doses of Lopressor, and digoxin (125 mcg followed by 500 mcg) with patient continuing to be in Afib with rate in 120-140s. He was also started on Eliquis on 12/6 for prevention of thromboembolism with Afib.     Echo from 11/21/22 shows EF of 53 (recovered from previous EF of 10% in March), LV and septal WMA. Troponins this admission were 0.161--> 0.132--> 0.097. EKG from 12/5 reveals Afib with rate 177. Cardiology consulted for Afib management and possible DCCV. Afib likely exacerbated by excess albuterol and duoneb use at home for COPD and alcohol use.     Current rhythm: Afib with RVR  Home meds: none  [id number] 2 (HTN, CHF)  HAS-BLED 2  TTE: EF 53%    Plan:  - Control with diltiazem bolus and diltiazem drip starting at 10 mg/hr and titrate up as needed and as BP will tolerate  - Anticoagulation with eliquis  - Telemetry  - Maintain K > 4, Mg > 2, Ca wnl    - NPO at midnight for ALESSANDRO/DCCV tomorrow  - counseled on alcohol cessation and cautioned about excess caffeine intake

## 2022-12-07 NOTE — PLAN OF CARE
"Management Plan Update  Patient Name: Baltazar Mccray       MARV updated by UR nurse that pt's [employer] is w/ [employer] and will need to establish at a local [employer] PCP clinic or unenroll w/ [employer] and select a new PCP. MARV has attempted to have this pt establish w/ [employer] in the past but he declined stating he needed to "think about it". MARV contacted MARV Santos w/ [employer] to discuss and she provided options. CM will speak w/ pt after he steps down from MICU and relay that he must establish w/ [employer] vs unenroll and select a new PCP. MARV has reached out to Dr Orona w/ the [facility] in [facility] and she will accept him as a pt. If pt is willing to select a new PCP, CM will assist pt w/ the process.     Steps are as follows:  1) need pt's zip code  2) [employer] #  3) CM has permission to speak for pt/place call at bedside w/ pt  4) call [employer] p [phone] or [phone]    CM will continue to follow this high risk pt for establishment of PCP.    EULOGIO Wheat, RN, [facility]  Transitional Care Manager  [phone]  [email or web address]    "

## 2022-12-07 NOTE — PROGRESS NOTES
[facility] - Cardiac Medical ICU  Critical Care Medicine  Progress Note    Patient Name: Baltazar Mccray  MRN: [id number]  Admission Date: 12/5/2022  Hospital Length of Stay: 2 days  Code Status: Full Code  Attending Provider: Tarah Patel MD  Primary Care Provider: No primary care provider on file.   Principal Problem: Acute on chronic respiratory failure with hypoxia and hypercapnia    Subjective:     HPI:  62 y.o. male with COPD on  2LNC home O2, Atrial Fibrillation, HTN, HFpEF, seizure disorder  who presents to the ED with shortness of breath. History mostly obtained from ED records as patient on BiPAP when seen. He refers about three days of worsening SOB despite increasing use of in his inhalers. Also refers an ongoing cough that is not changed from his baseline. x3 days. He denies fevers, chills, chest pain, palpitations, abd pain and dysuria. Patient arrived to ED w EMS, who reportedly noted him to be satting 90% on his usual 2L. Was noted to be tachycardic with EKG concerning for AF RVR so was given 10mg of IV diltiazem.    At the ED patient found to be in AF RVR with rates up to 170 however hemodynamically stable. CBC/CMP not overtly unremarkable. Troponin 0.161, . ABG 7.37/66. CXR showing hyperinflated lungs/flattened diaphrams without any consolidation. He was given IV methylpred, duonebs, and diltiazem. When seen patient visibly tachypneic on BiPAP. Patient admitted to MICU for further care.         Hospital/ICU Course:  Patient was admitted on BiPAP for acute on chronic respiratory failure with hypoxia and hypercapnia 2/2 COPD exacerbation. He received continuous Diltiazem and was placed on a heparin drip. Patient weaned off BiPAP and now is back on 2L NC, saturating well at baseline.  Diltiazem and heparin were stopped.  Patient started Metoprolol and Apixaban. Planned for stepdown. But patient continues to have episodes of afib with AVR, up to 150 bpm despite increased oral Metoprolol and IV  pushes. Digoxin was added.  Consulted cardiology for possible cardioversion.      Interval History/Significant Events: Episodes of afib with RVR with HR up to 150 bpm.    Review of Systems   Constitutional:  Negative for chills, fatigue and fever.   HENT:  Negative for congestion and sore throat.    Eyes:  Negative for visual disturbance.   Respiratory:  Positive for cough and shortness of breath. Negative for wheezing.    Cardiovascular:  Positive for palpitations. Negative for chest pain and leg swelling.   Gastrointestinal:  Negative for abdominal pain, diarrhea, nausea and vomiting.   Endocrine: Negative for polyuria.   Genitourinary:  Negative for dysuria, flank pain and frequency.   Musculoskeletal:  Negative for arthralgias, back pain and myalgias.   Skin:  Negative for pallor and rash.   Neurological:  Negative for light-headedness and headaches.   Psychiatric/Behavioral:  Positive for sleep disturbance.    Objective:     Vital Signs (Most Recent):  Temp: 97.8 °F (36.6 °C) (12/07/22 0700)  Pulse: (!) 119 (12/07/22 1100)  Resp: (!) 34 (12/07/22 1100)  BP: 123/87 (12/07/22 1100)  SpO2: 98 % (12/07/22 1100)   Vital Signs (24h Range):  Temp:  [97.2 °F (36.2 °C)-98.7 °F (37.1 °C)] 97.8 °F (36.6 °C)  Pulse:  [] 119  Resp:  [19-41] 34  SpO2:  [90 %-100 %] 98 %  BP: ()/() 123/87   Weight: 57.2 kg (126 lb 1.7 oz)  Body mass index is 19.17 kg/m².      Intake/Output Summary (Last 24 hours) at 12/7/2022 1210  Last data filed at 12/7/2022 1019  Gross per 24 hour   Intake 696 ml   Output 700 ml   Net -4 ml       Physical Exam  Vitals and nursing note reviewed.   Constitutional:       Appearance: Normal appearance.      Comments: Patient sitting in bed with NC in place.   HENT:      Head: Normocephalic and atraumatic.      Mouth/Throat:      Mouth: Mucous membranes are moist.      Pharynx: No oropharyngeal exudate.   Eyes:      Extraocular Movements: Extraocular movements intact.      Pupils: Pupils are  equal, round, and reactive to light.   Cardiovascular:      Rate and Rhythm: Regular rhythm. Tachycardia present.      Heart sounds: No murmur heard.     Comments: No JVD. HR is in 120's.  Pulmonary:      Effort: No respiratory distress.      Breath sounds: No wheezing or rales.   Abdominal:      General: Abdomen is flat. Bowel sounds are normal. There is no distension.      Tenderness: There is no abdominal tenderness. There is no guarding.   Musculoskeletal:         General: No swelling or tenderness. Normal range of motion.      Cervical back: Normal range of motion.      Right lower leg: No edema.      Left lower leg: No edema.   Lymphadenopathy:      Cervical: No cervical adenopathy.   Skin:     General: Skin is warm.      Coloration: Skin is not jaundiced.      Findings: No bruising or rash.   Neurological:      General: No focal deficit present.      Mental Status: He is alert and oriented to person, place, and time.      Cranial Nerves: No cranial nerve deficit.   Psychiatric:         Mood and Affect: Mood normal.       Vents:  Oxygen Concentration (%): 40 (12/07/22 0710)  Lines/Drains/Airways       Peripheral Intravenous Line  Duration                  Peripheral IV - Single Lumen 12/06/22 0200 20 G Anterior;Right Forearm 1 day         Peripheral IV - Single Lumen 12/06/22 20 G Left Antecubital 1 day                  Significant Labs:    CBC/Anemia Profile:  Recent Labs   Lab 12/05/22 2004 12/06/22 0147 12/07/22  0351   WBC 8.23 6.45 10.62   HGB 13.2* 12.3* 10.9*   HCT 42.2 39.6* 34.7*    303 318   MCV 80* 79* 81*   RDW 18.0* 17.7* 17.5*        Chemistries:  Recent Labs   Lab 12/05/22 2004 12/06/22 0147 12/07/22  0351    136 136   K 4.5 4.2 4.7   CL 97 98 101   CO2 33* 26 27   BUN 7 9 17   CREATININE 0.9 0.9 0.8   CALCIUM 9.8 9.4 9.7   ALBUMIN 3.0* 2.9* 2.7*   PROT 7.1 6.9 6.1   BILITOT 0.5 0.6 0.3   ALKPHOS 68 64 53*   ALT 16 15 15   AST 20 17 15   MG  --  1.7 1.8   PHOS  --  3.6 3.7        CMP:   Recent Labs   Lab 12/05/22 2004 12/06/22  0147 12/07/22  0351    136 136   K 4.5 4.2 4.7   CL 97 98 101   CO2 33* 26 27   * 175* 112*   BUN 7 9 17   CREATININE 0.9 0.9 0.8   CALCIUM 9.8 9.4 9.7   PROT 7.1 6.9 6.1   ALBUMIN 3.0* 2.9* 2.7*   BILITOT 0.5 0.6 0.3   ALKPHOS 68 64 53*   AST 20 17 15   ALT 16 15 15   ANIONGAP 9 12 8       Significant Imaging:  I have reviewed all pertinent imaging results/findings within the past 24 hours.      ABG  Recent Labs   Lab 12/05/22 2013   PH 7.373   PO2 24*   PCO2 66.2*   HCO3 38.6*   BE 13     Assessment/Plan:     Psychiatric  Alcohol use disorder, mild, abuse  - no signs or symptoms of withdrawal on admission  - CIWA q6h, PRN benzos for CIWA >8      Pulmonary  * Acute on chronic respiratory failure with hypoxia and hypercapnia  Patient with Hypercapnic and Hypoxic Respiratory failure which is Acute on chronic. He is on home oxygen at 2 LPM. Supplemental oxygen was provided and noted- Oxygen Concentration (%):  [100] 100.   Signs/symptoms of respiratory failure include- tachypnea and increased work of breathing. Contributing diagnoses includes - COPD and AF RVR Labs and images were reviewed. Patient Has recent ABG, which has been reviewed. Will treat underlying causes and adjust management of respiratory failure as follows-    - Prednisone 40mg PO x5d  - Levalbuterol q8h, Breo  - BiPAP qhs  - will monitor on telemetry  - pulse oximetry q4h     COPD exacerbation  Recurrent admissions for COPD exacerbations. Here with ongoing and unchanged cough. Dyspnea now improved.     -see tx plan for respiratory failure    Cardiac/Vascular  Persistent atrial fibrillation  62M hx of COPD on 2L, Atrial Fibrillation, HFpEF (EF 53%) here with increased dyspnea in the setting of inhaler use at home and found to be in AF RVR on admission with rates up to 170bpm. Hemodynamically stable on admission. He was given IV cardizem, nebs at ED.    -Continue Metoprolol 25mg PO  TID. Start Digoxin 0.125 mg PO QD.  -Contuinue Apixaban 5mg PO BID.  -telemetry  -Mg >2, K >4    Essential hypertension  -holding home nifepine in the setting of low-normal BP        Critical Care Daily Checklist:    A: Awake: RASS Goal/Actual Goal:    Actual: Rosa Agitation Sedation Scale (RASS): Alert and calm   B: Spontaneous Breathing Trial Performed?     C: SAT & SBT Coordinated?  N/A             D: Delirium: CAM-ICU Overall CAM-ICU: Negative   E: Early Mobility Performed? Yes   F: Feeding Goal:    Status:     Current Diet Order   Procedures    Diet Cardiac      AS: Analgesia/Sedation N/A   T: Thromboembolic Prophylaxis Yes, apixaban   H: HOB > 300 No   U: Stress Ulcer Prophylaxis (if needed) No   G: Glucose Control No   B: Bowel Function Stool Occurrence: 1   I: Indwelling Catheter (Lines & Pulliam) Necessity PIVs, no pulliam   D: De-escalation of Antimicrobials/Pharmacotherapies Yes    Plan for the day/ETD Monitor HR, f/u with cardiology    Code Status:  Family/Goals of Care: Full Code       Critical secondary to Patient has a condition that poses threat to life and bodily function: afib with RVR      Critical care was time spent personally by me on the following activities: development of treatment plan with patient or surrogate and bedside caregivers, discussions with consultants, evaluation of patient's response to treatment, examination of patient, ordering and performing treatments and interventions, ordering and review of laboratory studies, ordering and review of radiographic studies, pulse oximetry, re-evaluation of patient's condition. This critical care time did not overlap with that of any other provider or involve time for any procedures.     Abe Payne MD  Critical Care Medicine  [facility] - Cardiac Medical ICU

## 2022-12-07 NOTE — PT/OT/SLP EVAL
Occupational Therapy  Co-Evaluation and Discharge Note    Name: Baltazar Mccray  MRN: [id number]  Admitting Diagnosis: Acute on chronic respiratory failure with hypoxia and hypercapnia  Recent Surgery: * No surgery found *       Co- Evaluation performed due to complexity of  Pt's medical status.      Recommendations:     Discharge Recommendations: home  Discharge Equipment Recommendations: none  Barriers to discharge:  None    Assessment:     Baltazar Mccray is a 62 y.o. male with a medical diagnosis of Acute on chronic respiratory failure with hypoxia and hypercapnia. Pt presents with ROM/Strength WNL. Pt requires supervision for bed mobility 2* to IV/ oxygen  management. At this time, patient is functioning at their prior level of function and does not require further acute OT services.     Plan:     During this hospitalization, patient does not require further acute OT services.  Please re-consult if situation changes.    Plan of Care Reviewed with: patient    Subjective     Chief Complaint:  Pt only complained of inability to breathe.   Patient/Family Comments/goals: Return back home.     Occupational Profile:  Living Environment: Pt lives alone in a 1st floor apt with 0 AGUSTIN. Pt has a WIS style bathroom and reports ability to safely complete bathroom ADLs.   Previous level of function: Pt reports independence in all ADLs/ IADLs prior to hospitalization.   Roles and Routines: Pt   Equipment Used at home: oxygen  Assistance upon Discharge:  Pt reports family can help as needed.     Pain/Comfort:  Pain Rating 1: 0/10    Patients cultural, spiritual, Baptism conflicts given the current situation: no    Objective:     Communicated with: Nurse prior to session.  Patient found supine with telemetry, pulse ox (continuous), peripheral IV upon OT entry to room.    General Precautions: Standard, fall  Orthopedic Precautions: N/A  Braces: N/A  Respiratory Status: Nasal cannula, flow 3 L/min     Occupational Performance:    Bed  Mobility:    Patient completed Rolling/Turning to Right with supervision  Patient completed Supine to Sit with supervision  Patient completed Sit to Supine with supervision    Functional Mobility/Transfers:  Patient completed Sit <> Stand Transfer with supervision  with  no assistive device   Functional Mobility: Pt able to take 2 side steps to R with Supervision and no AD.    Activities of Daily Living:  No ADLs observed during this session as Pt reports he is able to complete activities independently and declined.     Cognitive/Visual Perceptual:  Cognitive/Psychosocial Skills:     -       Oriented to: Person, Place, Time, and Situation   -       Follows Commands/attention:Follows multistep  commands  -       Communication: clear/fluent  -       Safety awareness/insight to disability: intact   -       Mood/Affect/Coping skills/emotional control: Cooperative, Agitated, and Guarded    Physical Exam:  Dominant hand: -       Right  Upper Extremity Range of Motion:  -       Right Upper Extremity: WNL  -       Left Upper Extremity: WNL  Upper Extremity Strength: -       Right Upper Extremity: WNL  -       Left Upper Extremity: WNL   Strength: -       Right Upper Extremity: WNL  -       Left Upper Extremity: WNL    AMPAC 6 Click ADL:  AMPAC Total Score: 24    Treatment & Education:  Educated on Role of OT and OT d/c planning.   Educated on importance of OOB activity and impact on maintaining functional independence.     Patient left HOB elevated with all lines intact and call button in reach    GOALS:   Multidisciplinary Problems       Occupational Therapy Goals       Not on file                    History:     Past Medical History:   Diagnosis Date    Aspiration pneumonia 5/30/2021    Asthma     Atrial fibrillation with rapid ventricular response     CHF (congestive heart failure)     COPD (chronic obstructive pulmonary disease)     home O2 at night only    Coronary artery disease     Hypertension     Lung nodule      Pneumonia     Seizures          Past Surgical History:   Procedure Laterality Date    ESOPHAGOGASTRODUODENOSCOPY N/A 2/11/2022    Procedure: EGD (ESOPHAGOGASTRODUODENOSCOPY);  Surgeon: Cain Ortega MD;  Location: [facility] ([address] [place] [address]);  Service: Endoscopy;  Laterality: N/A;    ESOPHAGOGASTRODUODENOSCOPY N/A 9/15/2022    Procedure: EGD (ESOPHAGOGASTRODUODENOSCOPY);  Surgeon: Leonid Chavez MD;  Location: [facility] ENDO ([address] [place] [address]);  Service: Endoscopy;  Laterality: N/A;  PA-50 - 2nd floor  vacc-wears 2L o2-inst mail and verbal-clears 4 hrs prior-tb  8/30 pt rescheduled; updated instructions mailed-    LEFT HEART CATHETERIZATION  4/23/2020    Procedure: Left heart cath;  Surgeon: Nic Pollack MD;  Location: [facility] CATH LAB;  Service: Cardiology;;       Time Tracking:     OT Date of Treatment: 12/07/22  OT Start Time: 0948  OT Stop Time: 0959  OT Total Time (min): 11 min    Billable Minutes:Evaluation 11    12/7/2022

## 2022-12-07 NOTE — HPI
62 y.o. male with COPD on  2LNC home O2, Atrial Fibrillation, HTN, HFpEF (EF 53%), seizure disorder, gastric ulcer who presented to the ED with shortness of breath and cough for 3 days despite increased use of home inhalers and neb treatments. He arrived to ED w EMS, who reportedly noted him to be satting 90% on his usual 2L. Was noted to be tachycardic with EKG concerning for AF RVR so was given 10mg of IV diltiazem in the ED. Since admission, he has been treated for COPD exacerbation with IV methylpred, duonebs, prednisone, breo, and levalbuterol. He required BIPAP for acute on chronic respiratory failure and was admitted to MICU. Throughout his admission he has been in Afib with RVR. Attempts to control rate have been made with diltiazem drip, increasing doses of Lopressor, and digoxin (125 mcg followed by 500 mcg) with patient continuing to be in Afib with rate in 120-140s. He was also started on Eliquis on 12/6 for prevention of thromboembolism with Afib.     Patient reports palpitations and SOB as his main symptoms. He can barely sit up to eat or go to the bathroom without feeling short of breath. He denies chest pain, LE edema, nausea, vomiting, diaphoresis, bloody stools. He has a hx of tobacco use, stating that he quit smoking 3-4 months ago. Also has hx of ETOH abuse, using it to fall asleep. He denies having any prior knowledge of his Afib, previous cardioversion, or being on anticoagulation before despite previous notes dating back as far as Jan 2022 documenting possible new onset Afib with RVR. Echo from 11/21/22 shows EF of 53 (recovered from previous EF of 10% in March), LV and septal WMA. Troponins this admission were 0.161--> 0.132--> 0.097. . EKG from 12/5 reveals Afib with rate 177. Cardiology consulted for Afib management and possible DCCV.

## 2022-12-08 LAB
ABO + RH BLD: NORMAL
ALBUMIN SERPL BCP-MCNC: 2.6 G/DL (ref 3.5–5.2)
ALP SERPL-CCNC: 49 U/L (ref 55–135)
ALT SERPL W/O P-5'-P-CCNC: 18 U/L (ref 10–44)
ANION GAP SERPL CALC-SCNC: 10 MMOL/L (ref 8–16)
AST SERPL-CCNC: 16 U/L (ref 10–40)
BASOPHILS # BLD AUTO: 0.01 K/UL (ref 0–0.2)
BASOPHILS NFR BLD: 0.1 % (ref 0–1.9)
BILIRUB SERPL-MCNC: 0.2 MG/DL (ref 0.1–1)
BLD GP AB SCN CELLS X3 SERPL QL: NORMAL
BUN SERPL-MCNC: 22 MG/DL (ref 6–20)
CALCIUM SERPL-MCNC: 9.2 MG/DL (ref 8.7–10.5)
CHLORIDE SERPL-SCNC: 103 MMOL/L (ref 95–110)
CO2 SERPL-SCNC: 26 MMOL/L (ref 23–29)
CREAT SERPL-MCNC: 0.8 MG/DL (ref 0.5–1.4)
DIFFERENTIAL METHOD: ABNORMAL
DIGOXIN SERPL-MCNC: 0.7 NG/ML (ref 0.8–2)
EOSINOPHIL # BLD AUTO: 0 K/UL (ref 0–0.5)
EOSINOPHIL NFR BLD: 0 % (ref 0–8)
ERYTHROCYTE [DISTWIDTH] IN BLOOD BY AUTOMATED COUNT: 18.2 % (ref 11.5–14.5)
EST. GFR  (NO RACE VARIABLE): >60 ML/MIN/1.73 M^2
GLUCOSE SERPL-MCNC: 96 MG/DL (ref 70–110)
HCT VFR BLD AUTO: 35.8 % (ref 40–54)
HGB BLD-MCNC: 10.7 G/DL (ref 14–18)
IMM GRANULOCYTES # BLD AUTO: 0.06 K/UL (ref 0–0.04)
IMM GRANULOCYTES NFR BLD AUTO: 0.5 % (ref 0–0.5)
INR PPP: 1.1 (ref 0.8–1.2)
LYMPHOCYTES # BLD AUTO: 1.6 K/UL (ref 1–4.8)
LYMPHOCYTES NFR BLD: 13.3 % (ref 18–48)
MAGNESIUM SERPL-MCNC: 1.7 MG/DL (ref 1.6–2.6)
MCH RBC QN AUTO: 24.4 PG (ref 27–31)
MCHC RBC AUTO-ENTMCNC: 29.9 G/DL (ref 32–36)
MCV RBC AUTO: 82 FL (ref 82–98)
MONOCYTES # BLD AUTO: 0.5 K/UL (ref 0.3–1)
MONOCYTES NFR BLD: 4.1 % (ref 4–15)
NEUTROPHILS # BLD AUTO: 9.7 K/UL (ref 1.8–7.7)
NEUTROPHILS NFR BLD: 82 % (ref 38–73)
NRBC BLD-RTO: 0 /100 WBC
PHOSPHATE SERPL-MCNC: 3.6 MG/DL (ref 2.7–4.5)
PLATELET # BLD AUTO: 290 K/UL (ref 150–450)
PMV BLD AUTO: 10 FL (ref 9.2–12.9)
POTASSIUM SERPL-SCNC: 4.4 MMOL/L (ref 3.5–5.1)
PROT SERPL-MCNC: 5.7 G/DL (ref 6–8.4)
PROTHROMBIN TIME: 11.1 SEC (ref 9–12.5)
RBC # BLD AUTO: 4.38 M/UL (ref 4.6–6.2)
SODIUM SERPL-SCNC: 139 MMOL/L (ref 136–145)
TROPONIN I SERPL DL<=0.01 NG/ML-MCNC: 0.81 NG/ML (ref 0–0.03)
WBC # BLD AUTO: 11.84 K/UL (ref 3.9–12.7)

## 2022-12-08 PROCEDURE — 99900035 HC TECH TIME PER 15 MIN (STAT)

## 2022-12-08 PROCEDURE — 84100 ASSAY OF PHOSPHORUS: CPT | Performed by: STUDENT IN AN ORGANIZED HEALTH CARE EDUCATION/TRAINING PROGRAM

## 2022-12-08 PROCEDURE — 94640 AIRWAY INHALATION TREATMENT: CPT

## 2022-12-08 PROCEDURE — 99232 SBSQ HOSP IP/OBS MODERATE 35: CPT | Mod: GC,,, | Performed by: INTERNAL MEDICINE

## 2022-12-08 PROCEDURE — 85025 COMPLETE CBC W/AUTO DIFF WBC: CPT | Performed by: STUDENT IN AN ORGANIZED HEALTH CARE EDUCATION/TRAINING PROGRAM

## 2022-12-08 PROCEDURE — 84484 ASSAY OF TROPONIN QUANT: CPT | Performed by: STUDENT IN AN ORGANIZED HEALTH CARE EDUCATION/TRAINING PROGRAM

## 2022-12-08 PROCEDURE — 93010 EKG 12-LEAD: ICD-10-PCS | Mod: ,,, | Performed by: INTERNAL MEDICINE

## 2022-12-08 PROCEDURE — 63600175 PHARM REV CODE 636 W HCPCS: Performed by: STUDENT IN AN ORGANIZED HEALTH CARE EDUCATION/TRAINING PROGRAM

## 2022-12-08 PROCEDURE — 25000003 PHARM REV CODE 250

## 2022-12-08 PROCEDURE — 80162 ASSAY OF DIGOXIN TOTAL: CPT | Performed by: STUDENT IN AN ORGANIZED HEALTH CARE EDUCATION/TRAINING PROGRAM

## 2022-12-08 PROCEDURE — 86901 BLOOD TYPING SEROLOGIC RH(D): CPT | Performed by: STUDENT IN AN ORGANIZED HEALTH CARE EDUCATION/TRAINING PROGRAM

## 2022-12-08 PROCEDURE — 94660 CPAP INITIATION&MGMT: CPT

## 2022-12-08 PROCEDURE — 94761 N-INVAS EAR/PLS OXIMETRY MLT: CPT

## 2022-12-08 PROCEDURE — 25000242 PHARM REV CODE 250 ALT 637 W/ HCPCS: Performed by: STUDENT IN AN ORGANIZED HEALTH CARE EDUCATION/TRAINING PROGRAM

## 2022-12-08 PROCEDURE — 27000221 HC OXYGEN, UP TO 24 HOURS

## 2022-12-08 PROCEDURE — 93010 ELECTROCARDIOGRAM REPORT: CPT | Mod: ,,, | Performed by: INTERNAL MEDICINE

## 2022-12-08 PROCEDURE — 85610 PROTHROMBIN TIME: CPT | Performed by: STUDENT IN AN ORGANIZED HEALTH CARE EDUCATION/TRAINING PROGRAM

## 2022-12-08 PROCEDURE — 99233 SBSQ HOSP IP/OBS HIGH 50: CPT | Mod: GC,,, | Performed by: INTERNAL MEDICINE

## 2022-12-08 PROCEDURE — 99232 PR SUBSEQUENT HOSPITAL CARE,LEVL II: ICD-10-PCS | Mod: GC,,, | Performed by: INTERNAL MEDICINE

## 2022-12-08 PROCEDURE — 63600175 PHARM REV CODE 636 W HCPCS

## 2022-12-08 PROCEDURE — 25000003 PHARM REV CODE 250: Performed by: STUDENT IN AN ORGANIZED HEALTH CARE EDUCATION/TRAINING PROGRAM

## 2022-12-08 PROCEDURE — 83735 ASSAY OF MAGNESIUM: CPT | Performed by: STUDENT IN AN ORGANIZED HEALTH CARE EDUCATION/TRAINING PROGRAM

## 2022-12-08 PROCEDURE — 20600001 HC STEP DOWN PRIVATE ROOM

## 2022-12-08 PROCEDURE — 99233 PR SUBSEQUENT HOSPITAL CARE,LEVL III: ICD-10-PCS | Mod: GC,,, | Performed by: INTERNAL MEDICINE

## 2022-12-08 PROCEDURE — 80053 COMPREHEN METABOLIC PANEL: CPT | Performed by: STUDENT IN AN ORGANIZED HEALTH CARE EDUCATION/TRAINING PROGRAM

## 2022-12-08 PROCEDURE — 93005 ELECTROCARDIOGRAM TRACING: CPT

## 2022-12-08 RX ORDER — DILTIAZEM HYDROCHLORIDE 60 MG/1
180 TABLET, FILM COATED ORAL DAILY
Status: DISCONTINUED | OUTPATIENT
Start: 2022-12-08 | End: 2022-12-09 | Stop reason: HOSPADM

## 2022-12-08 RX ORDER — MAGNESIUM SULFATE HEPTAHYDRATE 40 MG/ML
2 INJECTION, SOLUTION INTRAVENOUS ONCE
Status: COMPLETED | OUTPATIENT
Start: 2022-12-08 | End: 2022-12-08

## 2022-12-08 RX ADMIN — FOLIC ACID 1 MG: 1 TABLET ORAL at 10:12

## 2022-12-08 RX ADMIN — Medication 100 MG: at 10:12

## 2022-12-08 RX ADMIN — APIXABAN 5 MG: 5 TABLET, FILM COATED ORAL at 08:12

## 2022-12-08 RX ADMIN — PREDNISONE 40 MG: 20 TABLET ORAL at 10:12

## 2022-12-08 RX ADMIN — DILTIAZEM HYDROCHLORIDE 180 MG: 60 TABLET, FILM COATED ORAL at 05:12

## 2022-12-08 RX ADMIN — FLUTICASONE FUROATE AND VILANTEROL TRIFENATATE 1 PUFF: 100; 25 POWDER RESPIRATORY (INHALATION) at 07:12

## 2022-12-08 RX ADMIN — APIXABAN 5 MG: 5 TABLET, FILM COATED ORAL at 10:12

## 2022-12-08 RX ADMIN — TIOTROPIUM BROMIDE INHALATION SPRAY 2 PUFF: 3.12 SPRAY, METERED RESPIRATORY (INHALATION) at 07:12

## 2022-12-08 RX ADMIN — MAGNESIUM SULFATE 2 G: 2 INJECTION INTRAVENOUS at 06:12

## 2022-12-08 RX ADMIN — LEVALBUTEROL HYDROCHLORIDE 0.63 MG: 0.63 SOLUTION RESPIRATORY (INHALATION) at 03:12

## 2022-12-08 RX ADMIN — THERA TABS 1 TABLET: TAB at 10:12

## 2022-12-08 RX ADMIN — LEVALBUTEROL HYDROCHLORIDE 0.63 MG: 0.63 SOLUTION RESPIRATORY (INHALATION) at 11:12

## 2022-12-08 RX ADMIN — LEVALBUTEROL HYDROCHLORIDE 0.63 MG: 0.63 SOLUTION RESPIRATORY (INHALATION) at 07:12

## 2022-12-08 NOTE — ASSESSMENT & PLAN NOTE
62M hx of COPD on 2L, Atrial Fibrillation, HFpEF (EF 53%) here with increased dyspnea in the setting of inhaler use at home and found to be in AF RVR on admission with rates up to 170bpm. Hemodynamically stable on admission. He was given IV cardizem, nebs at ED.    -Stopped Digoxin since pt is now bradycardic in 50's, stable.  -Contuinue Apixaban 5mg PO BID.  -telemetry  -Mg >2, K >4

## 2022-12-08 NOTE — RESIDENT HANDOFF
Critical Care Handoff     Primary Team: Networked reference to record [facility]  Room Number: 6080/6080 A     Patient Name: Baltazar Mccray MRN: [id number]     Date of Birth: [id number] Allergies: Benazepril     Age: 62 y.o. Admit Date: 12/5/2022     Sex: male  BMI: Body mass index is 19.11 kg/m².     Code Status: Full Code        llness Level (current clinical status): Watcher - Yes - afib RVR    Reason for Admission: Acute on chronic respiratory failure with hypoxia and hypercapnia    Brief HPI (pertinent PMH and diagnosis or differential diagnosis): 62 y.o. male with COPD on  2LNC home O2, Atrial Fibrillation, HTN, HFpEF, seizure disorder  who presents to the ED with shortness of breath. History mostly obtained from ED records as patient on BiPAP when seen. He refers about three days of worsening SOB despite increasing use of in his inhalers. Also refers an ongoing cough that is not changed from his baseline. x3 days. He denies fevers, chills, chest pain, palpitations, abd pain and dysuria. Patient arrived to ED w EMS, who reportedly noted him to be satting 90% on his usual 2L. Was noted to be tachycardic with EKG concerning for AF RVR so was given 10mg of IV diltiazem.    At the ED patient found to be in AF RVR with rates up to 170 however hemodynamically stable. CBC/CMP not overtly unremarkable. Troponin 0.161, . ABG 7.37/66. CXR showing hyperinflated lungs/flattened diaphrams without any consolidation. He was given IV methylpred, duonebs, and diltiazem. When seen patient visibly tachypneic on BiPAP. Patient admitted to MICU for further care.        Procedure Date: None    Hospital Course (updated, brief assessment by system or problem, significant events): Patient was admitted on BiPAP for acute on chronic respiratory failure with hypoxia and hypercapnia 2/2 COPD exacerbation. He received continuous Diltiazem and was placed on a heparin drip. Patient weaned off BiPAP and now is back on 2L NC, saturating well at  baseline.  Diltiazem and heparin were stopped.  Patient started Metoprolol and Apixaban. Planned for stepdown. But patient continues to have episodes of afib with AVR, up to 150 bpm despite increased oral Metoprolol and IV pushes. Digoxin was added.  Consulted cardiology for possible cardioversion. Pt is bradycardic in high 50's, stopped Diltiazem drip.     Tasks (specific, using if-then statements): If afib with RVR returns without med control, consult cardiology for possible cardioverion.    Contingency Plan (special circumstances anticipated and plan): If patient worsens, can re-consult critical care team.    Estimated Discharge Date: 12/9/2022    Discharge Disposition: Home or Self Care    Mentored By: Dr. Shreyas Patel

## 2022-12-08 NOTE — PROGRESS NOTES
[facility]  Cardiology  Progress Note    Patient Name: Baltazar Mccray  MRN: [id number]  Admission Date: 12/5/2022  Hospital Length of Stay: 3 days  Code Status: Full Code   Attending Physician: Tarah Patel MD   Primary Care Physician: No primary care provider on file.  Expected Discharge Date: 12/9/2022  Principal Problem:Acute on chronic respiratory failure with hypoxia and hypercapnia    Subjective:     Hospital Course:   No notes on file    Interval History: converted to sinus stephen. Off dilt drip since 7am. Palpitations resolved around 11pm or midnight. No chest pain.     Review of Systems   Cardiovascular:  Negative for chest pain, irregular heartbeat, leg swelling, orthopnea, palpitations and syncope.   Respiratory:  Positive for cough and shortness of breath.    Gastrointestinal:  Negative for hematochezia, nausea and vomiting.   Objective:     Vital Signs (Most Recent):  Temp: 98.1 °F (36.7 °C) (12/08/22 0300)  Pulse: 63 (12/08/22 0751)  Resp: (!) 30 (12/08/22 0751)  BP: (!) 144/76 (12/08/22 0600)  SpO2: 100 % (12/08/22 0751)   Vital Signs (24h Range):  Temp:  [98.1 °F (36.7 °C)-98.8 °F (37.1 °C)] 98.1 °F (36.7 °C)  Pulse:  [] 63  Resp:  [16-56] 30  SpO2:  [91 %-100 %] 100 %  BP: (129-168)/() 144/76     Weight: 57 kg (125 lb 10.6 oz)  Body mass index is 19.11 kg/m².     SpO2: 100 %  O2 Device (Oxygen Therapy): nasal cannula      Intake/Output Summary (Last 24 hours) at 12/8/2022 1226  Last data filed at 12/8/2022 0600  Gross per 24 hour   Intake 306.16 ml   Output 1100 ml   Net -793.84 ml       Lines/Drains/Airways       Peripheral Intravenous Line  Duration                  Peripheral IV - Single Lumen 12/06/22 0200 20 G Anterior;Right Forearm 2 days         Peripheral IV - Single Lumen 12/07/22 1615 18 G Anterior;Left Forearm <1 day                    Physical Exam  Constitutional:       General: He is not in acute distress.     Appearance: Normal appearance. He is not  ill-appearing.   HENT:      Head: Normocephalic and atraumatic.   Eyes:      General: No scleral icterus.  Cardiovascular:      Rate and Rhythm: Regular rhythm. Bradycardia present.      Pulses: Normal pulses.   Pulmonary:      Effort: Pulmonary effort is normal. No respiratory distress.      Breath sounds: Wheezing present. No rales.   Abdominal:      General: Abdomen is flat.   Musculoskeletal:      Cervical back: Normal range of motion. No rigidity.      Right lower leg: No edema.      Left lower leg: No edema.   Skin:     General: Skin is warm and dry.      Coloration: Skin is not jaundiced.   Neurological:      General: No focal deficit present.      Mental Status: He is alert.   Psychiatric:         Mood and Affect: Mood normal.         Behavior: Behavior normal.       Significant Labs: CMP   Recent Labs   Lab 12/07/22  0351 12/08/22  0512    139   K 4.7 4.4    103   CO2 27 26   * 96   BUN 17 22*   CREATININE 0.8 0.8   CALCIUM 9.7 9.2   PROT 6.1 5.7*   ALBUMIN 2.7* 2.6*   BILITOT 0.3 0.2   ALKPHOS 53* 49*   AST 15 16   ALT 15 18   ANIONGAP 8 10    and CBC   Recent Labs   Lab 12/07/22  0351 12/08/22  0512   WBC 10.62 11.84   HGB 10.9* 10.7*   HCT 34.7* 35.8*    290       Significant Imaging: EKG: sinus stephen, 1 mm ST elevations in II and III likely early repolarization given R'. Not infarct    Assessment and Plan:         Atrial fibrillation with RVR  62 y.o. male with COPD on  2LNC home O2, Atrial Fibrillation (first documented in Jan 2022), HTN, HFpEF (EF 53%), seizure disorder, gastric ulcer who presented to the ED with shortness of breath and cough for 3 days despite increased use of home inhalers and neb treatments. Attempts to control rate have been made with diltiazem drip, increasing doses of Lopressor, and digoxin (125 mcg followed by 500 mcg) with patient continuing to be in Afib with rate in 120-140s. He was also started on Eliquis on 12/6 for prevention of thromboembolism  with Afib.     Echo from 11/21/22 shows EF of 53 (recovered from previous EF of 10% in March), LV and septal WMA. Troponins this admission were 0.161--> 0.132--> 0.097. EKG from 12/5 reveals Afib with rate 177. Cardiology consulted for Afib management and possible DCCV. Afib likely exacerbated by excess albuterol and duoneb use at home for COPD and alcohol use.     Current rhythm: sinus stephen  Home meds: none  [id number] 2 (HTN, CHF)  HAS-BLED 2  TTE: EF 53%    EKG on 12/8 shows sinus stephen, with 1 mm ST elevations likely 2/2 to early repolarization in leads II and III given presence of R'    Plan:  - Converted to sinus stephen/normal sinus rhythm overnight. Cancel ALESSANDRO/DCCV, ok to start diet  - Stop diltiazem drip. Start PO diltiazem 180  - Anticoagulation with eliquis  - Telemetry  - Maintain K > 4, Mg > 2, Ca wnl    - counseled on alcohol cessation and cautioned about excess caffeine intake   - cardiology to sign off. Please call back with any additional questions          VTE Risk Mitigation (From admission, onward)         Ordered     apixaban tablet 5 mg  2 times daily         12/06/22 0914     IP VTE HIGH RISK PATIENT  Once         12/05/22 2242     Place sequential compression device  Until discontinued         12/05/22 2242                Brown Villeda MD  Cardiology  [facility] - Cardiac Medical ICU

## 2022-12-08 NOTE — HOSPITAL COURSE
Patient was admitted on BiPAP for acute on chronic respiratory failure with hypoxia and hypercapnia 2/2 COPD exacerbation. He received continuous Diltiazem and was placed on a heparin drip. Patient weaned off BiPAP and now is back on 2L NC, saturating well at baseline.  Diltiazem and heparin were stopped.  Patient started Metoprolol and Apixaban. Planned for stepdown. But patient continues to have episodes of afib with AVR, up to 150 bpm despite increased oral Metoprolol and IV pushes. Digoxin was added.  Consulted cardiology for possible cardioversion. Pt is bradycardic in high 50's, stopped Diltiazem drip.    Patient maintained normal rate on oral diltiazem.  Continued anticoagulation with apixaban.  Pantoprazole added for GI protection from anticoagulation.  Reports continued improvement of respiratory status.  Discharge with follow-up.

## 2022-12-08 NOTE — SUBJECTIVE & OBJECTIVE
Interval History: converted to sinus stephen. Off dilt drip since 7am. Palpitations resolved around 11pm or midnight. No chest pain.     Review of Systems   Cardiovascular:  Negative for chest pain, irregular heartbeat, leg swelling, orthopnea, palpitations and syncope.   Respiratory:  Positive for cough and shortness of breath.    Gastrointestinal:  Negative for hematochezia, nausea and vomiting.   Objective:     Vital Signs (Most Recent):  Temp: 98.1 °F (36.7 °C) (12/08/22 0300)  Pulse: 63 (12/08/22 0751)  Resp: (!) 30 (12/08/22 0751)  BP: (!) 144/76 (12/08/22 0600)  SpO2: 100 % (12/08/22 0751)   Vital Signs (24h Range):  Temp:  [98.1 °F (36.7 °C)-98.8 °F (37.1 °C)] 98.1 °F (36.7 °C)  Pulse:  [] 63  Resp:  [16-56] 30  SpO2:  [91 %-100 %] 100 %  BP: (129-168)/() 144/76     Weight: 57 kg (125 lb 10.6 oz)  Body mass index is 19.11 kg/m².     SpO2: 100 %  O2 Device (Oxygen Therapy): nasal cannula      Intake/Output Summary (Last 24 hours) at 12/8/2022 1226  Last data filed at 12/8/2022 0600  Gross per 24 hour   Intake 306.16 ml   Output 1100 ml   Net -793.84 ml       Lines/Drains/Airways       Peripheral Intravenous Line  Duration                  Peripheral IV - Single Lumen 12/06/22 0200 20 G Anterior;Right Forearm 2 days         Peripheral IV - Single Lumen 12/07/22 1615 18 G Anterior;Left Forearm <1 day                    Physical Exam  Constitutional:       General: He is not in acute distress.     Appearance: Normal appearance. He is not ill-appearing.   HENT:      Head: Normocephalic and atraumatic.   Eyes:      General: No scleral icterus.  Cardiovascular:      Rate and Rhythm: Regular rhythm. Bradycardia present.      Pulses: Normal pulses.   Pulmonary:      Effort: Pulmonary effort is normal. No respiratory distress.      Breath sounds: Wheezing present. No rales.   Abdominal:      General: Abdomen is flat.   Musculoskeletal:      Cervical back: Normal range of motion. No rigidity.      Right lower  leg: No edema.      Left lower leg: No edema.   Skin:     General: Skin is warm and dry.      Coloration: Skin is not jaundiced.   Neurological:      General: No focal deficit present.      Mental Status: He is alert.   Psychiatric:         Mood and Affect: Mood normal.         Behavior: Behavior normal.       Significant Labs: CMP   Recent Labs   Lab 12/07/22  0351 12/08/22  0512    139   K 4.7 4.4    103   CO2 27 26   * 96   BUN 17 22*   CREATININE 0.8 0.8   CALCIUM 9.7 9.2   PROT 6.1 5.7*   ALBUMIN 2.7* 2.6*   BILITOT 0.3 0.2   ALKPHOS 53* 49*   AST 15 16   ALT 15 18   ANIONGAP 8 10    and CBC   Recent Labs   Lab 12/07/22  0351 12/08/22  0512   WBC 10.62 11.84   HGB 10.9* 10.7*   HCT 34.7* 35.8*    290       Significant Imaging: EKG: sinus stephen, 1 mm ST elevations in II and III likely early repolarization given R'. Not infarct

## 2022-12-08 NOTE — PLAN OF CARE
[facility] DAILY GOALS       A: Awake    RASS: Goal -    Actual -     Restraint necessity:    B: Breathe   SBT: Not intubated   C: Coordinate A & B, analgesics/sedatives   Pain: managed    SAT: Not intubated  D: Delirium   CAM-ICU: Overall CAM-ICU: Negative  E: Early(intubated/ Progressive (non-intubated) Mobility   MOVE Screen: Pass   Activity: Activity Management: Rolling - L1  FAS: Feeding/Nutrition   Diet order: Diet/Nutrition Received: low saturated fat/low cholesterol,    T: Thrombus   DVT prophylaxis: VTE Required Core Measure: Pharmacological prophylaxis initiated/maintained  H: HOB Elevation   Head of Bed (HOB) Positioning: HOB at 30-45 degrees  U: Ulcer Prophylaxis   GI: yes  G: Glucose control   managed    S: Skin   Bathing/Skin Care: linen changed  Device Skin Pressure Protection: absorbent pad utilized/changed, adhesive use limited, positioning supports utilized, pressure points protected, skin-to-device areas padded, skin-to-skin areas padded  Pressure Reduction Devices: specialty bed utilized, pressure-redistributing mattress utilized  Pressure Reduction Techniques: frequent weight shift encouraged  Skin Protection: adhesive use limited, tubing/devices free from skin contact, transparent dressing maintained, skin-to-skin areas padded, skin-to-device areas padded  B: Bowel Function   no issues   I: Indwelling Catheters   Lucas necessity:     CVC necessity: Yes  D: De-escalation Antibiotics   No    Family/Goals of care/Code Status   Code Status: Full Code    24H Vital Sign Range  Temp:  [97.8 °F (36.6 °C)-98.8 °F (37.1 °C)]   Pulse:  []   Resp:  [16-56]   BP: (103-167)/()   SpO2:  [91 %-100 %]      Shift Events   Pt monitored this shift. Mitch and ecg changes reported to MD. No new orders. Pt asymptomatic. Pt NPO since MN for ALESSANDRO and poss Cardioversion. Consents in chart. Pt wore Bipap during HS. IV Mag replacement ordered and initiated.     VS and assessment per flow sheet, patient progressing  towards goals as tolerated, plan of care reviewed with family, all concerns addressed, will continue to monitor.      Problem: Adult Inpatient Plan of Care  Goal: Plan of Care Review  Outcome: Ongoing, Progressing  Goal: Patient-Specific Goal (Individualized)  Outcome: Ongoing, Progressing  Goal: Absence of Hospital-Acquired Illness or Injury  Outcome: Ongoing, Progressing  Goal: Optimal Comfort and Wellbeing  Outcome: Ongoing, Progressing  Goal: Readiness for Transition of Care  Outcome: Ongoing, Progressing     Problem: Fall Injury Risk  Goal: Absence of Fall and Fall-Related Injury  Outcome: Ongoing, Progressing

## 2022-12-08 NOTE — SUBJECTIVE & OBJECTIVE
Interval History/Significant Events: Patient is now bradycardic in 50's, stable.    Review of Systems   Constitutional:  Negative for chills, fatigue and fever.   HENT:  Negative for congestion and sore throat.    Eyes:  Negative for visual disturbance.   Respiratory:  Positive for cough and shortness of breath. Negative for wheezing.    Cardiovascular:  Positive for palpitations. Negative for chest pain and leg swelling.   Gastrointestinal:  Negative for abdominal pain, diarrhea, nausea and vomiting.   Endocrine: Negative for polyuria.   Genitourinary:  Negative for dysuria, flank pain and frequency.   Musculoskeletal:  Negative for arthralgias, back pain and myalgias.   Skin:  Negative for pallor and rash.   Neurological:  Negative for light-headedness and headaches.   Psychiatric/Behavioral:  Positive for sleep disturbance.    Objective:     Vital Signs (Most Recent):  Temp: 98.1 °F (36.7 °C) (12/08/22 0300)  Pulse: 63 (12/08/22 0751)  Resp: (!) 30 (12/08/22 0751)  BP: (!) 144/76 (12/08/22 0600)  SpO2: 100 % (12/08/22 0751)   Vital Signs (24h Range):  Temp:  [98 °F (36.7 °C)-98.8 °F (37.1 °C)] 98.1 °F (36.7 °C)  Pulse:  [] 63  Resp:  [16-56] 30  SpO2:  [91 %-100 %] 100 %  BP: (123-168)/() 144/76   Weight: 57 kg (125 lb 10.6 oz)  Body mass index is 19.11 kg/m².      Intake/Output Summary (Last 24 hours) at 12/8/2022 1053  Last data filed at 12/8/2022 0600  Gross per 24 hour   Intake 306.16 ml   Output 1100 ml   Net -793.84 ml       Physical Exam  Vitals and nursing note reviewed.   Constitutional:       Appearance: Normal appearance.      Comments: Patient sitting in bed with NC in place.   HENT:      Head: Normocephalic and atraumatic.      Mouth/Throat:      Mouth: Mucous membranes are moist.      Pharynx: No oropharyngeal exudate.   Eyes:      Extraocular Movements: Extraocular movements intact.      Pupils: Pupils are equal, round, and reactive to light.   Cardiovascular:      Rate and Rhythm:  Regular rhythm. Tachycardia present.      Heart sounds: No murmur heard.     Comments: No JVD. HR is in 120's.  Pulmonary:      Effort: No respiratory distress.      Breath sounds: No wheezing or rales.   Abdominal:      General: Abdomen is flat. Bowel sounds are normal. There is no distension.      Tenderness: There is no abdominal tenderness. There is no guarding.   Musculoskeletal:         General: No swelling or tenderness. Normal range of motion.      Cervical back: Normal range of motion.      Right lower leg: No edema.      Left lower leg: No edema.   Lymphadenopathy:      Cervical: No cervical adenopathy.   Skin:     General: Skin is warm.      Coloration: Skin is not jaundiced.      Findings: No bruising or rash.   Neurological:      General: No focal deficit present.      Mental Status: He is alert and oriented to person, place, and time.      Cranial Nerves: No cranial nerve deficit.   Psychiatric:         Mood and Affect: Mood normal.       Vents:  Oxygen Concentration (%): 40 (12/08/22 0400)  Lines/Drains/Airways       Peripheral Intravenous Line  Duration                  Peripheral IV - Single Lumen 12/06/22 0200 20 G Anterior;Right Forearm 2 days         Peripheral IV - Single Lumen 12/07/22 1615 18 G Anterior;Left Forearm <1 day                  Significant Labs:    CBC/Anemia Profile:  Recent Labs   Lab 12/07/22  0351 12/08/22  0512   WBC 10.62 11.84   HGB 10.9* 10.7*   HCT 34.7* 35.8*    290   MCV 81* 82   RDW 17.5* 18.2*        Chemistries:  Recent Labs   Lab 12/07/22  0351 12/08/22  0512    139   K 4.7 4.4    103   CO2 27 26   BUN 17 22*   CREATININE 0.8 0.8   CALCIUM 9.7 9.2   ALBUMIN 2.7* 2.6*   PROT 6.1 5.7*   BILITOT 0.3 0.2   ALKPHOS 53* 49*   ALT 15 18   AST 15 16   MG 1.8 1.7   PHOS 3.7 3.6       Troponin: No results for input(s): TROPONINI in the last 48 hours.    Significant Imaging:  I have reviewed all pertinent imaging results/findings within the past 24 hours.

## 2022-12-08 NOTE — ASSESSMENT & PLAN NOTE
62 y.o. male with COPD on  2LNC home O2, Atrial Fibrillation (first documented in Jan 2022), HTN, HFpEF (EF 53%), seizure disorder, gastric ulcer who presented to the ED with shortness of breath and cough for 3 days despite increased use of home inhalers and neb treatments. Attempts to control rate have been made with diltiazem drip, increasing doses of Lopressor, and digoxin (125 mcg followed by 500 mcg) with patient continuing to be in Afib with rate in 120-140s. He was also started on Eliquis on 12/6 for prevention of thromboembolism with Afib.     Echo from 11/21/22 shows EF of 53 (recovered from previous EF of 10% in March), LV and septal WMA. Troponins this admission were 0.161--> 0.132--> 0.097. EKG from 12/5 reveals Afib with rate 177. Cardiology consulted for Afib management and possible DCCV. Afib likely exacerbated by excess albuterol and duoneb use at home for COPD and alcohol use.     Current rhythm: sinus stephen  Home meds: none  [id number] 2 (HTN, CHF)  HAS-BLED 2  TTE: EF 53%    EKG on 12/8 shows sinus stephen, with 1 mm ST elevations likely 2/2 to early repolarization in leads II and III given presence of R'    Plan:  - Converted to sinus stephen/normal sinus rhythm overnight. Cancel ALESSANDRO/DCCV, ok to start diet  - Stop diltiazem drip. Start PO diltiazem 180  - Anticoagulation with eliquis  - Telemetry  - Maintain K > 4, Mg > 2, Ca wnl    - counseled on alcohol cessation and cautioned about excess caffeine intake   - cardiology to sign off. Please call back with any additional questions

## 2022-12-08 NOTE — PROGRESS NOTES
[facility] - Cardiac Medical ICU  Critical Care Medicine  Progress Note    Patient Name: Baltazar Mccray  MRN: [id number]  Admission Date: 12/5/2022  Hospital Length of Stay: 3 days  Code Status: Full Code  Attending Provider: Tarah Patel MD  Primary Care Provider: No primary care provider on file.   Principal Problem: Acute on chronic respiratory failure with hypoxia and hypercapnia    Subjective:     HPI:  62 y.o. male with COPD on  2LNC home O2, Atrial Fibrillation, HTN, HFpEF, seizure disorder  who presents to the ED with shortness of breath. History mostly obtained from ED records as patient on BiPAP when seen. He refers about three days of worsening SOB despite increasing use of in his inhalers. Also refers an ongoing cough that is not changed from his baseline. x3 days. He denies fevers, chills, chest pain, palpitations, abd pain and dysuria. Patient arrived to ED w EMS, who reportedly noted him to be satting 90% on his usual 2L. Was noted to be tachycardic with EKG concerning for AF RVR so was given 10mg of IV diltiazem.    At the ED patient found to be in AF RVR with rates up to 170 however hemodynamically stable. CBC/CMP not overtly unremarkable. Troponin 0.161, . ABG 7.37/66. CXR showing hyperinflated lungs/flattened diaphrams without any consolidation. He was given IV methylpred, duonebs, and diltiazem. When seen patient visibly tachypneic on BiPAP. Patient admitted to MICU for further care.         Hospital/ICU Course:  Patient was admitted on BiPAP for acute on chronic respiratory failure with hypoxia and hypercapnia 2/2 COPD exacerbation. He received continuous Diltiazem and was placed on a heparin drip. Patient weaned off BiPAP and now is back on 2L NC, saturating well at baseline.  Diltiazem and heparin were stopped.  Patient started Metoprolol and Apixaban. Planned for stepdown. But patient continues to have episodes of afib with AVR, up to 150 bpm despite increased oral Metoprolol and IV  pushes. Digoxin was added.  Consulted cardiology for possible cardioversion. Pt is bradycardic in high 50's, stopped Diltiazem drip.      Interval History/Significant Events: Patient is now bradycardic in 50's, stable.    Review of Systems   Constitutional:  Negative for chills, fatigue and fever.   HENT:  Negative for congestion and sore throat.    Eyes:  Negative for visual disturbance.   Respiratory:  Positive for cough and shortness of breath. Negative for wheezing.    Cardiovascular:  Positive for palpitations. Negative for chest pain and leg swelling.   Gastrointestinal:  Negative for abdominal pain, diarrhea, nausea and vomiting.   Endocrine: Negative for polyuria.   Genitourinary:  Negative for dysuria, flank pain and frequency.   Musculoskeletal:  Negative for arthralgias, back pain and myalgias.   Skin:  Negative for pallor and rash.   Neurological:  Negative for light-headedness and headaches.   Psychiatric/Behavioral:  Positive for sleep disturbance.    Objective:     Vital Signs (Most Recent):  Temp: 98.1 °F (36.7 °C) (12/08/22 0300)  Pulse: 63 (12/08/22 0751)  Resp: (!) 30 (12/08/22 0751)  BP: (!) 144/76 (12/08/22 0600)  SpO2: 100 % (12/08/22 0751)   Vital Signs (24h Range):  Temp:  [98 °F (36.7 °C)-98.8 °F (37.1 °C)] 98.1 °F (36.7 °C)  Pulse:  [] 63  Resp:  [16-56] 30  SpO2:  [91 %-100 %] 100 %  BP: (123-168)/() 144/76   Weight: 57 kg (125 lb 10.6 oz)  Body mass index is 19.11 kg/m².      Intake/Output Summary (Last 24 hours) at 12/8/2022 1053  Last data filed at 12/8/2022 0600  Gross per 24 hour   Intake 306.16 ml   Output 1100 ml   Net -793.84 ml       Physical Exam  Vitals and nursing note reviewed.   Constitutional:       Appearance: Normal appearance.      Comments: Patient sitting in bed with NC in place.   HENT:      Head: Normocephalic and atraumatic.      Mouth/Throat:      Mouth: Mucous membranes are moist.      Pharynx: No oropharyngeal exudate.   Eyes:      Extraocular  Movements: Extraocular movements intact.      Pupils: Pupils are equal, round, and reactive to light.   Cardiovascular:      Rate and Rhythm: Regular rhythm. Tachycardia present.      Heart sounds: No murmur heard.     Comments: No JVD. HR is in 120's.  Pulmonary:      Effort: No respiratory distress.      Breath sounds: No wheezing or rales.   Abdominal:      General: Abdomen is flat. Bowel sounds are normal. There is no distension.      Tenderness: There is no abdominal tenderness. There is no guarding.   Musculoskeletal:         General: No swelling or tenderness. Normal range of motion.      Cervical back: Normal range of motion.      Right lower leg: No edema.      Left lower leg: No edema.   Lymphadenopathy:      Cervical: No cervical adenopathy.   Skin:     General: Skin is warm.      Coloration: Skin is not jaundiced.      Findings: No bruising or rash.   Neurological:      General: No focal deficit present.      Mental Status: He is alert and oriented to person, place, and time.      Cranial Nerves: No cranial nerve deficit.   Psychiatric:         Mood and Affect: Mood normal.       Vents:  Oxygen Concentration (%): 40 (12/08/22 0400)  Lines/Drains/Airways       Peripheral Intravenous Line  Duration                  Peripheral IV - Single Lumen 12/06/22 0200 20 G Anterior;Right Forearm 2 days         Peripheral IV - Single Lumen 12/07/22 1615 18 G Anterior;Left Forearm <1 day                  Significant Labs:    CBC/Anemia Profile:  Recent Labs   Lab 12/07/22  0351 12/08/22  0512   WBC 10.62 11.84   HGB 10.9* 10.7*   HCT 34.7* 35.8*    290   MCV 81* 82   RDW 17.5* 18.2*        Chemistries:  Recent Labs   Lab 12/07/22  0351 12/08/22  0512    139   K 4.7 4.4    103   CO2 27 26   BUN 17 22*   CREATININE 0.8 0.8   CALCIUM 9.7 9.2   ALBUMIN 2.7* 2.6*   PROT 6.1 5.7*   BILITOT 0.3 0.2   ALKPHOS 53* 49*   ALT 15 18   AST 15 16   MG 1.8 1.7   PHOS 3.7 3.6       Troponin: No results for input(s):  TROPONINI in the last 48 hours.    Significant Imaging:  I have reviewed all pertinent imaging results/findings within the past 24 hours.      ABG  Recent Labs   Lab 12/05/22 2013   PH 7.373   PO2 24*   PCO2 66.2*   HCO3 38.6*   BE 13     Assessment/Plan:     Psychiatric  Alcohol use disorder, mild, abuse  - no signs or symptoms of withdrawal on admission  - CIWA q6h, PRN benzos for CIWA >8      Pulmonary  * Acute on chronic respiratory failure with hypoxia and hypercapnia  Patient with Hypercapnic and Hypoxic Respiratory failure which is Acute on chronic. He is on home oxygen at 2 LPM. Supplemental oxygen was provided and noted- Oxygen Concentration (%):  [100] 100.   Signs/symptoms of respiratory failure include- tachypnea and increased work of breathing. Contributing diagnoses includes - COPD and AF RVR Labs and images were reviewed. Patient Has recent ABG, which has been reviewed. Will treat underlying causes and adjust management of respiratory failure as follows-    - Prednisone 40mg PO x5d  - Levalbuterol q8h, Breo  - BiPAP qhs  - will monitor on telemetry  - pulse oximetry q4h     COPD exacerbation  Recurrent admissions for COPD exacerbations. Here with ongoing and unchanged cough. Dyspnea now improved.     -see tx plan for respiratory failure    Cardiac/Vascular  Atrial fibrillation with RVR  See persistent afib plan.    Persistent atrial fibrillation  62M hx of COPD on 2L, Atrial Fibrillation, HFpEF (EF 53%) here with increased dyspnea in the setting of inhaler use at home and found to be in AF RVR on admission with rates up to 170bpm. Hemodynamically stable on admission. He was given IV cardizem, nebs at ED.    -Stopped Digoxin since pt is now bradycardic in 50's, stable.  -Contuinue Apixaban 5mg PO BID.  -telemetry  -Mg >2, K >4    Essential hypertension  -holding home nifepine in the setting of low-normal BP        Critical Care Daily Checklist:    A: Awake: RASS Goal/Actual Goal:    Actual: Rosa  Agitation Sedation Scale (RASS): Alert and calm   B: Spontaneous Breathing Trial Performed?     C: SAT & SBT Coordinated?  N/A                    D: Delirium: CAM-ICU Overall CAM-ICU: Negative   E: Early Mobility Performed? Yes   F: Feeding Goal:    Status:     Current Diet Order   Procedures    Diet NPO Except for: Medication     Except Medication as instructed (can take up to 1 oz of water with medications)     Order Specific Question:   Except for     Answer:   Medication      AS: Analgesia/Sedation None   T: Thromboembolic Prophylaxis Yes, apixaban   H: HOB > 300 No   U: Stress Ulcer Prophylaxis (if needed) No   G: Glucose Control No   B: Bowel Function Stool Occurrence: 1   I: Indwelling Catheter (Lines & Pulliam) Necessity PIVs, no pulliam   D: De-escalation of Antimicrobials/Pharmacotherapies Yes    Plan for the day/ETD Cardiology eval and plan for SD    Code Status:  Family/Goals of Care: Full Code       Critical secondary to Patient has a condition that poses threat to life and bodily function: afib with RVR      Critical care was time spent personally by me on the following activities: development of treatment plan with patient or surrogate and bedside caregivers, discussions with consultants, evaluation of patient's response to treatment, examination of patient, ordering and performing treatments and interventions, ordering and review of laboratory studies, ordering and review of radiographic studies, pulse oximetry, re-evaluation of patient's condition. This critical care time did not overlap with that of any other provider or involve time for any procedures.     Abe Payne MD  Critical Care Medicine  [facility] - Card[facility] Medical [facility]

## 2022-12-08 NOTE — HPI
Baltazar Mccray is a 62 y.o. male with COPD on  2LNC home O2, Atrial Fibrillation, HTN, HFpEF, seizure disorder  who presents to the ED with shortness of breath. History mostly obtained from ED records as patient on BiPAP when seen. He refers about three days of worsening SOB despite increasing use of in his inhalers. Also refers an ongoing cough that is not changed from his baseline. x3 days. He denies fevers, chills, chest pain, palpitations, abd pain and dysuria. Patient arrived to ED w EMS, who reportedly noted him to be satting 90% on his usual 2L. Was noted to be tachycardic with EKG concerning for AF RVR so was given 10mg of IV diltiazem.    At the ED patient found to be in AF RVR with rates up to 170 however hemodynamically stable. CBC/CMP not overtly unremarkable. Troponin 0.161, . ABG 7.37/66. CXR showing hyperinflated lungs/flattened diaphrams without any consolidation. He was given IV methylpred, duonebs, and diltiazem. When seen patient visibly tachypneic on BiPAP. Patient admitted to MICU for further care.

## 2022-12-09 ENCOUNTER — TELEPHONE (OUTPATIENT)
Dept: PRIMARY CARE CLINIC | Facility: CLINIC | Age: 62
End: 2022-12-09
Payer: MEDICARE

## 2022-12-09 VITALS
TEMPERATURE: 98 F | RESPIRATION RATE: 20 BRPM | SYSTOLIC BLOOD PRESSURE: 132 MMHG | DIASTOLIC BLOOD PRESSURE: 76 MMHG | BODY MASS INDEX: 19.14 KG/M2 | HEIGHT: 68 IN | HEART RATE: 79 BPM | OXYGEN SATURATION: 95 % | WEIGHT: 126.31 LBS

## 2022-12-09 DIAGNOSIS — J42 CHRONIC BRONCHITIS, UNSPECIFIED CHRONIC BRONCHITIS TYPE: Primary | ICD-10-CM

## 2022-12-09 LAB
ALBUMIN SERPL BCP-MCNC: 2.8 G/DL (ref 3.5–5.2)
ALP SERPL-CCNC: 59 U/L (ref 55–135)
ALT SERPL W/O P-5'-P-CCNC: 17 U/L (ref 10–44)
ANION GAP SERPL CALC-SCNC: 8 MMOL/L (ref 8–16)
AST SERPL-CCNC: 14 U/L (ref 10–40)
BASOPHILS # BLD AUTO: 0.01 K/UL (ref 0–0.2)
BASOPHILS NFR BLD: 0.1 % (ref 0–1.9)
BILIRUB SERPL-MCNC: 0.3 MG/DL (ref 0.1–1)
BUN SERPL-MCNC: 20 MG/DL (ref 6–20)
CALCIUM SERPL-MCNC: 9.6 MG/DL (ref 8.7–10.5)
CHLORIDE SERPL-SCNC: 101 MMOL/L (ref 95–110)
CO2 SERPL-SCNC: 30 MMOL/L (ref 23–29)
CREAT SERPL-MCNC: 0.8 MG/DL (ref 0.5–1.4)
DIFFERENTIAL METHOD: ABNORMAL
EOSINOPHIL # BLD AUTO: 0 K/UL (ref 0–0.5)
EOSINOPHIL NFR BLD: 0.2 % (ref 0–8)
ERYTHROCYTE [DISTWIDTH] IN BLOOD BY AUTOMATED COUNT: 18.7 % (ref 11.5–14.5)
EST. GFR  (NO RACE VARIABLE): >60 ML/MIN/1.73 M^2
GLUCOSE SERPL-MCNC: 46 MG/DL (ref 70–110)
HCT VFR BLD AUTO: 40.4 % (ref 40–54)
HGB BLD-MCNC: 12.1 G/DL (ref 14–18)
IMM GRANULOCYTES # BLD AUTO: 0.02 K/UL (ref 0–0.04)
IMM GRANULOCYTES NFR BLD AUTO: 0.2 % (ref 0–0.5)
LYMPHOCYTES # BLD AUTO: 2.2 K/UL (ref 1–4.8)
LYMPHOCYTES NFR BLD: 21 % (ref 18–48)
MAGNESIUM SERPL-MCNC: 1.8 MG/DL (ref 1.6–2.6)
MCH RBC QN AUTO: 24.8 PG (ref 27–31)
MCHC RBC AUTO-ENTMCNC: 30 G/DL (ref 32–36)
MCV RBC AUTO: 83 FL (ref 82–98)
MONOCYTES # BLD AUTO: 0.7 K/UL (ref 0.3–1)
MONOCYTES NFR BLD: 6.7 % (ref 4–15)
NEUTROPHILS # BLD AUTO: 7.6 K/UL (ref 1.8–7.7)
NEUTROPHILS NFR BLD: 71.8 % (ref 38–73)
NRBC BLD-RTO: 0 /100 WBC
PHOSPHATE SERPL-MCNC: 2.7 MG/DL (ref 2.7–4.5)
PLATELET # BLD AUTO: 302 K/UL (ref 150–450)
PMV BLD AUTO: 10.2 FL (ref 9.2–12.9)
POTASSIUM SERPL-SCNC: 4.5 MMOL/L (ref 3.5–5.1)
PROT SERPL-MCNC: 6.1 G/DL (ref 6–8.4)
RBC # BLD AUTO: 4.87 M/UL (ref 4.6–6.2)
SODIUM SERPL-SCNC: 139 MMOL/L (ref 136–145)
WBC # BLD AUTO: 10.56 K/UL (ref 3.9–12.7)

## 2022-12-09 PROCEDURE — 83735 ASSAY OF MAGNESIUM: CPT | Performed by: STUDENT IN AN ORGANIZED HEALTH CARE EDUCATION/TRAINING PROGRAM

## 2022-12-09 PROCEDURE — 99239 PR HOSPITAL DISCHARGE DAY,>30 MIN: ICD-10-PCS | Mod: ,,, | Performed by: STUDENT IN AN ORGANIZED HEALTH CARE EDUCATION/TRAINING PROGRAM

## 2022-12-09 PROCEDURE — 1111F PR DISCHARGE MEDS RECONCILED W/ CURRENT OUTPATIENT MED LIST: ICD-10-PCS | Mod: CPTII,,, | Performed by: STUDENT IN AN ORGANIZED HEALTH CARE EDUCATION/TRAINING PROGRAM

## 2022-12-09 PROCEDURE — 99239 HOSP IP/OBS DSCHRG MGMT >30: CPT | Mod: ,,, | Performed by: STUDENT IN AN ORGANIZED HEALTH CARE EDUCATION/TRAINING PROGRAM

## 2022-12-09 PROCEDURE — 85025 COMPLETE CBC W/AUTO DIFF WBC: CPT | Performed by: STUDENT IN AN ORGANIZED HEALTH CARE EDUCATION/TRAINING PROGRAM

## 2022-12-09 PROCEDURE — 25000003 PHARM REV CODE 250: Performed by: STUDENT IN AN ORGANIZED HEALTH CARE EDUCATION/TRAINING PROGRAM

## 2022-12-09 PROCEDURE — 1111F DSCHRG MED/CURRENT MED MERGE: CPT | Mod: CPTII,,, | Performed by: STUDENT IN AN ORGANIZED HEALTH CARE EDUCATION/TRAINING PROGRAM

## 2022-12-09 PROCEDURE — 27000221 HC OXYGEN, UP TO 24 HOURS

## 2022-12-09 PROCEDURE — 80053 COMPREHEN METABOLIC PANEL: CPT | Performed by: STUDENT IN AN ORGANIZED HEALTH CARE EDUCATION/TRAINING PROGRAM

## 2022-12-09 PROCEDURE — 94660 CPAP INITIATION&MGMT: CPT

## 2022-12-09 PROCEDURE — 94640 AIRWAY INHALATION TREATMENT: CPT

## 2022-12-09 PROCEDURE — 94761 N-INVAS EAR/PLS OXIMETRY MLT: CPT

## 2022-12-09 PROCEDURE — 63600175 PHARM REV CODE 636 W HCPCS: Performed by: STUDENT IN AN ORGANIZED HEALTH CARE EDUCATION/TRAINING PROGRAM

## 2022-12-09 PROCEDURE — 99900035 HC TECH TIME PER 15 MIN (STAT)

## 2022-12-09 PROCEDURE — 84100 ASSAY OF PHOSPHORUS: CPT | Performed by: STUDENT IN AN ORGANIZED HEALTH CARE EDUCATION/TRAINING PROGRAM

## 2022-12-09 PROCEDURE — 25000242 PHARM REV CODE 250 ALT 637 W/ HCPCS: Performed by: STUDENT IN AN ORGANIZED HEALTH CARE EDUCATION/TRAINING PROGRAM

## 2022-12-09 PROCEDURE — 36415 COLL VENOUS BLD VENIPUNCTURE: CPT | Performed by: STUDENT IN AN ORGANIZED HEALTH CARE EDUCATION/TRAINING PROGRAM

## 2022-12-09 PROCEDURE — 25000003 PHARM REV CODE 250

## 2022-12-09 RX ORDER — PANTOPRAZOLE SODIUM 40 MG/1
40 TABLET, DELAYED RELEASE ORAL DAILY
Qty: 30 TABLET | Refills: 11 | Status: SHIPPED | OUTPATIENT
Start: 2022-12-09 | End: 2022-12-21 | Stop reason: DRUGHIGH

## 2022-12-09 RX ORDER — HYDRALAZINE HYDROCHLORIDE 25 MG/1
25 TABLET, FILM COATED ORAL EVERY 8 HOURS PRN
Status: DISCONTINUED | OUTPATIENT
Start: 2022-12-09 | End: 2022-12-09 | Stop reason: HOSPADM

## 2022-12-09 RX ORDER — MUPIROCIN 20 MG/G
OINTMENT TOPICAL 2 TIMES DAILY
Status: DISCONTINUED | OUTPATIENT
Start: 2022-12-09 | End: 2022-12-09 | Stop reason: HOSPADM

## 2022-12-09 RX ORDER — PREDNISONE 20 MG/1
40 TABLET ORAL DAILY
Qty: 2 TABLET | Refills: 0 | Status: SHIPPED | OUTPATIENT
Start: 2022-12-10 | End: 2022-12-21

## 2022-12-09 RX ORDER — LANOLIN ALCOHOL/MO/W.PET/CERES
100 CREAM (GRAM) TOPICAL DAILY
Qty: 30 TABLET | Refills: 2 | Status: SHIPPED | OUTPATIENT
Start: 2022-12-09 | End: 2022-12-21 | Stop reason: SDUPTHER

## 2022-12-09 RX ORDER — LEVALBUTEROL INHALATION SOLUTION 0.63 MG/3ML
0.63 SOLUTION RESPIRATORY (INHALATION) EVERY 8 HOURS PRN
Status: DISCONTINUED | OUTPATIENT
Start: 2022-12-09 | End: 2022-12-09 | Stop reason: HOSPADM

## 2022-12-09 RX ORDER — DILTIAZEM HYDROCHLORIDE 90 MG/1
180 TABLET, FILM COATED ORAL DAILY
Qty: 60 TABLET | Refills: 11 | Status: SHIPPED | OUTPATIENT
Start: 2022-12-10 | End: 2022-12-21 | Stop reason: SDUPTHER

## 2022-12-09 RX ADMIN — FOLIC ACID 1 MG: 1 TABLET ORAL at 09:12

## 2022-12-09 RX ADMIN — TIOTROPIUM BROMIDE INHALATION SPRAY 2 PUFF: 3.12 SPRAY, METERED RESPIRATORY (INHALATION) at 08:12

## 2022-12-09 RX ADMIN — LEVALBUTEROL HYDROCHLORIDE 0.63 MG: 0.63 SOLUTION RESPIRATORY (INHALATION) at 08:12

## 2022-12-09 RX ADMIN — HYDRALAZINE HYDROCHLORIDE 25 MG: 25 TABLET, FILM COATED ORAL at 09:12

## 2022-12-09 RX ADMIN — APIXABAN 5 MG: 5 TABLET, FILM COATED ORAL at 09:12

## 2022-12-09 RX ADMIN — PREDNISONE 40 MG: 20 TABLET ORAL at 09:12

## 2022-12-09 RX ADMIN — FLUTICASONE FUROATE AND VILANTEROL TRIFENATATE 1 PUFF: 100; 25 POWDER RESPIRATORY (INHALATION) at 08:12

## 2022-12-09 RX ADMIN — THERA TABS 1 TABLET: TAB at 09:12

## 2022-12-09 RX ADMIN — DILTIAZEM HYDROCHLORIDE 180 MG: 60 TABLET, FILM COATED ORAL at 09:12

## 2022-12-09 RX ADMIN — Medication 100 MG: at 09:12

## 2022-12-09 NOTE — PLAN OF CARE
Hospital Medicine ICU Acceptance Note    Date of Admit: 12/5/2022  Date of Transfer / Stepdown: 12/8/2022  Taryn, C/J, H, L, Onc (IV chemo w/in 1 month), Gyn/Onc, or other special case?: No   ICU team stepping patient down: MICU  ICU team member giving verbal handoff: Brown Villeda   Accepting  team: R    Brief History of Present Illness:      Baltazar Mccray is a 62 y.o. male with COPD on  2LNC home O2, Atrial Fibrillation, HTN, HFpEF, seizure disorder  who presents to the ED with shortness of breath. History mostly obtained from ED records as patient on BiPAP when seen. He refers about three days of worsening SOB despite increasing use of in his inhalers. Also refers an ongoing cough that is not changed from his baseline. x3 days. He denies fevers, chills, chest pain, palpitations, abd pain and dysuria. Patient arrived to ED w EMS, who reportedly noted him to be satting 90% on his usual 2L. Was noted to be tachycardic with EKG concerning for AF RVR so was given 10mg of IV diltiazem.    At the ED patient found to be in AF RVR with rates up to 170 however hemodynamically stable. CBC/CMP not overtly unremarkable. Troponin 0.161, . ABG 7.37/66. CXR showing hyperinflated lungs/flattened diaphrams without any consolidation. He was given IV methylpred, duonebs, and diltiazem. When seen patient visibly tachypneic on BiPAP. Patient admitted to MICU for further care.        Hospital/ICU Course:     Patient was admitted on BiPAP for acute on chronic respiratory failure with hypoxia and hypercapnia 2/2 COPD exacerbation. He received continuous Diltiazem and was placed on a heparin drip. Patient weaned off BiPAP and now is back on 2L NC, saturating well at baseline.  Diltiazem and heparin were stopped.  Patient started Metoprolol and Apixaban. Planned for stepdown. But patient continues to have episodes of afib with AVR, up to 150 bpm despite increased oral Metoprolol and IV pushes. Digoxin was added.  Consulted  cardiology for possible cardioversion. Pt is bradycardic in high 50's, stopped Diltiazem drip.     Consultants and Procedures:     Consultants:  Cardiology    Procedures:    None    Transfer Information:     Diet:  Cardiac    Physical Activity:   PT/OT consulted    To Do / Pending Studies / Follow ups:  - Continue medical management of AFib RVR  - May need cardioversion if RVR returns       Patient has been accepted by Hospital Medicine Team R, who will assume care of the patient upon arrival to the floor from the ICU. Please contact ICU team with any concerns prior to arrival. Please contact Hospital Medicine at [phone] or [phone] (please do NOT leave a voicemail) when patient arrives to the floor.    Vipul Vázquez MD  Hospital Medicine Staff

## 2022-12-09 NOTE — PLAN OF CARE
Management Plan Update  Patient Name: Baltazar Mccray       CM to patient's room to discuss his preference for PCP choices. Pt states he does not want to use the [facility] clinics and prefers to have his f/u at [facility]. CM explained that he will need to unenroll from the [employer] plan thru [employer] and CM can assist w/ process - pt is agreeable. CM contacted [employer] customer support, pt provided his info and gave permission for CM to assist - process became very lengthy as [employer] rep was having difficultly w/ choice of PCP's NPI #. Pt has been accepted by Dr Orona's [facility]. Pt is ready for d/c and will complete his process, get ride home while CM finishes changing the PCP. CM ultimately spent over an hour on the phone w/ the [employer] rep but the PCP changes were made and pt's new PCP Dr Orona will begin 1/1/2023 and he will receive new cards in the mail. [employer] rep stated his Hosp FU can be w/ any MD that accepts [employer] in the meantime. CM relayed the info to Dr Orona's clinic and is waiting for the Hosp FU date & time - will contact pt w/ appt info.    EULOGIO Wheat, RN, [facility]  Transitional Care Manager  [phone]  [email or web address]

## 2022-12-09 NOTE — TELEPHONE ENCOUNTER
Referral placed to [facility] and message sent to Lisseth for a TCC appt.    He will be seen in [facility] within the week.    Thanks,  GRANT

## 2022-12-09 NOTE — NURSING
Removed IV sites and telemetry per order. Patient in no acute distress, awaiting medications and discharge papers.

## 2022-12-09 NOTE — PLAN OF CARE
MARV phoned Fernanda, Transitional Care Manager with [facility] regarding who to schedule patient with as a PCP upon discharge.  Per Fernanda, she will meet with patient today to discuss patient choosing a PCP either with Sonny or Dr. Yeyo franz/ the [facility] in [facility] .  Fernanda stated she will arrange once she obtains patient's permission.      Marimar Vanegas RN, CCRN-K, [facility]  Neuro-Critical Care   [phone]

## 2022-12-09 NOTE — NURSING
Spoke with  via phone, given patient's primary care provider appointment information to pass on to patient before leaving. Wrote out the appointment info on patient's discharge paperwork, and verified that patient understood this information.  Appointment is 12/19/2022 at 2:30 PM with Dr. Ríos (patient's transportation will arrive at 1:30 PM).

## 2022-12-09 NOTE — PLAN OF CARE
Patient is discharging today, transportation set up for 3:00pm.        Future Appointments   Date Time Provider Department Center   1/31/2023  9:30 AM [facility] Pascal MD [facility]

## 2022-12-09 NOTE — NURSING
Patient resting in bed, eyes open. AAOx4. Resp even and unlabored. Patient requesting cup of coffee. No other distress noted or voiced.

## 2022-12-09 NOTE — NURSING
Patient alert and oriented x 4. Denies any pain or discomfort. Denies any respiratory distress. Patient slept well. Safety measures maintain. Bed in lowest position and call light within reach.

## 2022-12-09 NOTE — TELEPHONE ENCOUNTER
----- Message from Rossana Gibbons, RN sent at 12/9/2022  2:05 PM CST -----  Regarding: hospital f/u and establish care  I spoke w/ Dr Orona earlier this week concerning this high risk patient that I follow. She stated she would accept him in the [facility]. He is discharging today. He has [employer] but never established w/ any of their clinics and has declined to do so stating he'd rather have his follow ups at [facility]. I worked w/ him to unenroll from the [employer] plan and added Dr Orona as the PCP which will begin 1/1/2023. [employer] rep stated hospital follow ups are covered as long as the provider accepts [employer]. He is also seen by [facility] but there are no upcoming appts in the system. Please schedule him a Hosp FU and he will need a ride. I will continue to follow him as a high risk patient for now and please let me know any way I can assist. Thank you for your assistance in the coordination of this patient's care.    EULOGIO Wheat, RN, [facility]  Transitional Care Manager  [phone]  [email or web address]

## 2022-12-09 NOTE — NURSING
"Continuing to wait for [facility] transportation for patient. Patient is concerned and anxious about the delay in transportation, wants to know "what is taking so long." Explained that transportation is sometimes delayed when large volume of patients are being discharged from hospital at once. Will follow up with  on call if appropriate.  "

## 2022-12-09 NOTE — PLAN OF CARE
[facility] - Intensive Care ([facility])  Discharge Final Note    Primary Care Provider: No primary care provider on file.    Expected Discharge Date: 12/9/2022    Final Discharge Note (most recent)       Final Note - 12/09/22 1432          Final Note    Assessment Type Final Discharge Note (P)      Anticipated Discharge Disposition Home or Self Care (P)                      Important Message from Medicare             Contact Info       Delphine Orona MD   Specialty: Internal Medicine    [address]  [facility] [address]   Phone: [phone]       Next Steps: Follow up          Patient discharging home today no post acute needs.  .  Transport set up for 3:00pm by wheelchair van and 4 liters of oxygen.       Future Appointments   Date Time Provider Department Center   1/31/2023  9:30 AM Carlo Pascal MD [facility]  [facility]   [id number]

## 2022-12-12 ENCOUNTER — PES CALL (OUTPATIENT)
Dept: ADMINISTRATIVE | Facility: CLINIC | Age: 62
End: 2022-12-12
Payer: MEDICARE

## 2022-12-12 ENCOUNTER — PATIENT OUTREACH (OUTPATIENT)
Dept: ADMINISTRATIVE | Facility: CLINIC | Age: 62
End: 2022-12-12
Payer: MEDICARE

## 2022-12-12 NOTE — DISCHARGE SUMMARY
[facility] - Intensive Care ([place] [address])  [facility] Medicine  Discharge Summary      Patient Name: Baltazar Mccray  MRN: [id number]  THEODORE: [id number]  Patient Class: IP- Inpatient  Admission Date: 12/5/2022  Hospital Length of Stay: 4 days  Discharge Date and Time: 12/9/2022  5:57 PM  Attending Physician: No att. providers found   Discharging Provider: Vipul Vázquez MD  Primary Care Provider: No primary care provider on file.  Hospital Medicine Team: [facility]OSP MED R Vipul Vázquez MD  Primary Care Team: [facility] HOSP MED R    HPI:   Baltazar Mccray is a 62 y.o. male with COPD on  2LNC home O2, Atrial Fibrillation, HTN, HFpEF, seizure disorder  who presents to the ED with shortness of breath. History mostly obtained from ED records as patient on BiPAP when seen. He refers about three days of worsening SOB despite increasing use of in his inhalers. Also refers an ongoing cough that is not changed from his baseline. x3 days. He denies fevers, chills, chest pain, palpitations, abd pain and dysuria. Patient arrived to ED w EMS, who reportedly noted him to be satting 90% on his usual 2L. Was noted to be tachycardic with EKG concerning for AF RVR so was given 10mg of IV diltiazem.    At the ED patient found to be in AF RVR with rates up to 170 however hemodynamically stable. CBC/CMP not overtly unremarkable. Troponin 0.161, . ABG 7.37/66. CXR showing hyperinflated lungs/flattened diaphrams without any consolidation. He was given IV methylpred, duonebs, and diltiazem. When seen patient visibly tachypneic on BiPAP. Patient admitted to MICU for further care.          * No surgery found *      Hospital Course:   Patient was admitted on BiPAP for acute on chronic respiratory failure with hypoxia and hypercapnia 2/2 COPD exacerbation. He received continuous Diltiazem and was placed on a heparin drip. Patient weaned off BiPAP and now is back on 2L NC, saturating well at baseline.  Diltiazem and heparin were stopped.  Patient  started Metoprolol and Apixaban. Planned for stepdown. But patient continues to have episodes of afib with AVR, up to 150 bpm despite increased oral Metoprolol and IV pushes. Digoxin was added.  Consulted cardiology for possible cardioversion. Pt is bradycardic in high 50's, stopped Diltiazem drip.    Patient maintained normal rate on oral diltiazem.  Continued anticoagulation with apixaban.  Pantoprazole added for GI protection from anticoagulation.  Reports continued improvement of respiratory status.  Discharge with follow-up.       Goals of Care Treatment Preferences:  Code Status: Full Code      Consults:   Consults (From admission, onward)        Status Ordering Provider     Inpatient consult to Critical Care Medicine  Once        Provider:  (Not yet assigned)    Completed FAZAL MURO          No new Assessment & Plan notes have been filed under this hospital service since the last note was generated.  Service: Hospital Medicine    Final Active Diagnoses:    Diagnosis Date Noted POA    PRINCIPAL PROBLEM:  Acute on chronic respiratory failure with hypoxia and hypercapnia [J96.21, J96.22] 01/29/2020 Yes    Atrial fibrillation with RVR [I48.91] 12/07/2022 Unknown    Persistent atrial fibrillation [I48.19] 12/05/2022 Unknown    Alcohol use disorder, mild, abuse [F10.10]  Yes     Chronic    Essential hypertension [I10] 05/19/2017 Yes     Chronic    COPD exacerbation [J44.1] 05/14/2017 Yes      Problems Resolved During this Admission:       Discharged Condition: good    Disposition: Home or Self Care    Follow Up:   Follow-up Information     Delphine Orona MD Follow up on 12/19/2022.    Specialty: Internal Medicine  Why: Hospital Follow-up Monday 12/19 @ 2:30pm. Boubacar will  at 1:30pm.  Contact information:  [address]  [facility] [address] [phone]                       Patient Instructions:      Ambulatory referral/consult to Internal Medicine   Standing Status: Future   Referral  Priority: Routine Referral Type: Consultation   Referral Reason: Specialty Services Required   Requested Specialty: Internal Medicine   Number of Visits Requested: 1     Ambulatory referral/consult to Cardiology   Standing Status: Future   Referral Priority: Routine Referral Type: Consultation   Referral Reason: Specialty Services Required   Requested Specialty: Cardiology   Number of Visits Requested: 1     Ambulatory referral/consult to Pulmonology   Standing Status: Future   Referral Priority: Routine Referral Type: Consultation   Referral Reason: Specialty Services Required   Requested Specialty: Pulmonary Disease   Number of Visits Requested: 1     Diet Cardiac     Notify your health care provider if you experience any of the following:  increased confusion or weakness     Notify your health care provider if you experience any of the following:  persistent dizziness, light-headedness, or visual disturbances     Notify your health care provider if you experience any of the following:  worsening rash     Notify your health care provider if you experience any of the following:  severe persistent headache     Notify your health care provider if you experience any of the following:  difficulty breathing or increased cough     Notify your health care provider if you experience any of the following:  redness, tenderness, or signs of infection (pain, swelling, redness, odor or green/yellow discharge around incision site)     Notify your health care provider if you experience any of the following:  severe uncontrolled pain     Notify your health care provider if you experience any of the following:  persistent nausea and vomiting or diarrhea     Notify your health care provider if you experience any of the following:  temperature >100.4     Activity as tolerated       Significant Diagnostic Studies: Labs: All labs within the past 24 hours have been reviewed    Pending Diagnostic Studies:     Procedure Component Value  Units Date/Time    EKG 12-lead [[id number]]     Order Status: Sent Lab Status: No result          Medications:  Reconciled Home Medications:      Medication List      START taking these medications    diltiaZEM 90 MG tablet  Commonly known as: CARDIZEM  Take 2 tablets (180 mg total) by mouth once daily.     ELIQUIS 5 mg Tab  Generic drug: apixaban  Take 1 tablet (5 mg total) by mouth 2 (two) times daily.     pantoprazole 40 MG tablet  Commonly known as: PROTONIX  Take 1 tablet (40 mg total) by mouth once daily.     predniSONE 20 MG tablet  Commonly known as: DELTASONE  Take 2 tablets (40 mg total) by mouth once daily.        CONTINUE taking these medications    albuterol 90 mcg/actuation inhaler  Commonly known as: PROVENTIL/VENTOLIN HFA  Inhale 1-2 puffs into the lungs every 6 (six) hours as needed for Wheezing. Rescue     albuterol-ipratropium 2.5 mg-0.5 mg/3 mL nebulizer solution  Commonly known as: DUO-NEB  Use 1 vial (3 mLs) by nebulization every 4 (four) hours as needed for Wheezing or Shortness of Breath. Rescue     bismuth subsalicylate 262 mg/15 mL suspension  Commonly known as: PEPTO BISMOL  Take 30 mLs by mouth daily as needed for Indigestion.     BREZTRI AEROSPHERE 160-9-4.8 mcg/actuation Hfaa  Generic drug: budesonide-glycopyr-formoterol  Inhale 2 puffs into the lungs 2 (two) times daily.     ferrous sulfate 325 (65 FE) MG EC tablet  Take 1 tablet (325 mg total) by mouth once daily.     folic acid 1 MG tablet  Commonly known as: FOLVITE  Take 1 tablet (1 mg total) by mouth once daily.     furosemide 20 MG tablet  Commonly known as: LASIX  Take 2 tablets (40 mg total) by mouth once daily.     nicotine 7 mg/24 hr  Commonly known as: NICODERM CQ  Place 1 patch onto the skin once daily.     ONCOVITE tablet  Generic drug: multivitamin  Take 1 tablet by mouth once daily.     thiamine 100 MG tablet  Take 1 tablet (100 mg total) by mouth once daily.     TUMS ORAL  Take 2 tablets by mouth daily as needed  (Heartburn).        STOP taking these medications    NIFEdipine 30 MG (OSM) 24 hr tablet  Commonly known as: PROCARDIA-XL            Indwelling Lines/Drains at time of discharge:   Lines/Drains/Airways     None                 Time spent on the discharge of patient: 35 minutes         Vipul Vázquez MD  Department of Hospital Medicine  [facility] - Intensive Care ([place]-14)

## 2022-12-12 NOTE — PROGRESS NOTES
PLEASE NOTE HOSP F/U SHOULD BE WITHIN 7 DAYS FROM DISCHARGE DATE.    C3 nurse attempted to contact Baltazar Mccray  for a TCC post hospital discharge follow up call. No answer. Left voicemail with callback information. The patient has a scheduled HOSFU appointment with MARIAA Orona on 128/19/2022 @ 1400.

## 2022-12-13 NOTE — PROGRESS NOTES
C3 nurse spoke with Baltazar Mccray  for a TCC post hospital discharge follow up call. The patient has a scheduled HOSFU appointment with DERIC MEDELLIN  on 12/19/2022 @ 6010.    MESSAGE SENT TO STAFF

## 2022-12-13 NOTE — PROGRESS NOTES
[facility]  Transition of Care Home Visit    Visit Date: 11/23/22  Encounter Provider: Susan Rae   PCP:  No primary care provider on file.    PRESENTING HISTORY      Patient ID: Baltazar Mccray is a 62 y.o. male.    Consult Requested By:    Reason for Consult:  Hospital Follow Up.    Baltazar is being seen at home due to being seen at home due to physical debility that presents a taxing effort to leave the home, to mitigate high risk of hospital readmission and/or due to the limited availability of reliable or safe options for transportation to the point of access to the provider. Prior to treatment on this visit the chart was reviewed and patient verbal consent was obtained.      Chief Complaint: Follow-up    History of Present Illness: Mr. Baltazar Mccray is a 62 y.o. male who was recently admitted to the hospital for COPD excerebration.     Admission Date: 8/10/2022  Hospital Length of Stay: 0 days  Discharge Date and Time: 8/12/2022      HPI:     Hospital Course:   Mr. Mccray was admitted to hospital medicine for acute on chronic respiratory failure in setting of COPD exacerbation. Initial pH 7.299 w/ pCO2 76.8 consistent with hypercapnic respiratory failure, placed on BIPAP and repeat VBG improved, weaned to home oxygen. Started on scheduled breathing treatments and prednisone. Pulmonology consulted for recurrent COPD exacerbations, patient is using LABA more than recommended dosage as [facility] be leading to bronchospasm. Patient educated on proper use. Recommend azithromycin 500 mg MWF and follow up in clinic. Patient with elevated BNP of 926 (prior normal) and repeat echo with EF 55%, indeterminate diastolic function, abnormal septal wall motion, intermediate CVP (8 mmHg) and known interatrial septal aneurysm. Home lasix increased to 40 mg and patient given one time dose of IV lasix with significant UOP and improvement in SOB. Patient discharged on prednisone to complete 5 day course, azithromycin MWF,  and lasix 40 mg daily. Educated on smoking cessation, nicotine patches ordered and and referral placed. BMP in 1 week. Referral to COPD clinic placed. PCP follow up in 1 week.   ____________________________________________  Today:Mr. Mccray is being evaluated at home for a transition of care visit s/p hospitalization, see above.     With this visit today patient is found sitting up on sofa with his continuous oxygen in progress. Patient is AAOx3, able to verify his name and . He sees Daughter of Ignacia as his PCP. I will continue seeing the patient in the home as it is difficult for him to physically make visits. He endorses eating x 3 small meals per day with Premiere protein as supplemental drinks for meal replacements. Pt's sister helps him with household chores and his medications. She is not present today during visit but I spoke with her on the phone as she manages his medications.  Pt taking all recent hospital d/c medications. Pt was taking still taking his Coreg even though was d/c on latest hospital visit, advised pt to stop taking.   Oxygen precautions reinforced. Education completed ~ 15 minutes. Plan to follow up.     VSS. Denies fever, chest pain,  nausea, vomiting, diarrhea. Risks of environmental exposure to coronavirus discussed including: social distancing, hand hygiene, and limiting departures from the home for necessities only.  Reports understanding and willingness to comply.  All hospital discharge orders reviewed and being followed, all medications reconciled and reviewed, patient and family verbalized understanding. No other needs identified at this time.     Attestation: Screening criteria to assess the level of the patient's risk for infection with COVID-19 as recommended by the CDC at the time of the above documented home visit concluded appropriateness to proceed. Universal precautions were maintained at all times, including provider use of 60% alcohol gel hand  immediately  prior to entry and upon departing the patient's home.      Review of Systems   Constitutional:  Negative for fatigue and unexpected weight change.   HENT:  Negative for congestion.    Eyes:  Negative for redness and visual disturbance.   Respiratory:  Positive for shortness of breath. Negative for cough.    Cardiovascular:  Negative for chest pain and palpitations.   Gastrointestinal:  Negative for abdominal distention, nausea and vomiting.   Endocrine: Negative for polydipsia and polyuria.   Genitourinary:  Negative for difficulty urinating.   Musculoskeletal:  Negative for arthralgias and gait problem.   Skin:  Negative for color change.   Allergic/Immunologic: Negative for food allergies.   Neurological:  Negative for dizziness and weakness.   Psychiatric/Behavioral:  Negative for agitation.      Assessments:  Environmental: Lives in apartments, 1st floor  Functional Status: Independent  Safety: Fall precautions  Nutritional: n/a  Home Health/DME/Supplies: n/a    PAST HISTORY:     Past Medical History:   Diagnosis Date    Aspiration pneumonia 5/30/2021    Asthma     Atrial fibrillation with rapid ventricular response     CHF (congestive heart failure)     COPD (chronic obstructive pulmonary disease)     home O2 at night only    Coronary artery disease     Hypertension     Lung nodule     Pneumonia     Seizures        Past Surgical History:   Procedure Laterality Date    ESOPHAGOGASTRODUODENOSCOPY N/A 2/11/2022    Procedure: EGD (ESOPHAGOGASTRODUODENOSCOPY);  Surgeon: Cain Ortega MD;  Location: [facility] ([address] [place] [address]);  Service: Endoscopy;  Laterality: N/A;    ESOPHAGOGASTRODUODENOSCOPY N/A 9/15/2022    Procedure: EGD (ESOPHAGOGASTRODUODENOSCOPY);  Surgeon: Leonid Chavez MD;  Location: [address]);  Service: Endoscopy;  Laterality: N/A;  PA-50 - 2nd floor  vacc-wears 2L o2-inst mail and verbal-clears 4 hrs prior-tb  8/30 pt rescheduled; updated instructions mailed-st    LEFT HEART CATHETERIZATION   4/23/2020    Procedure: Left heart cath;  Surgeon: Nic Pollack MD;  Location: [facility] CATH LAB;  Service: Cardiology;;       Family History   Problem Relation Age of Onset    Cancer Father     Hypertension Sister     Stroke Sister     Stroke Brother     Diabetes Neg Hx     Heart disease Neg Hx        Social History     Socioeconomic History    Marital status: Single   Tobacco Use    Smoking status: Some Days     Packs/day: 0.25     Types: Cigarettes    Smokeless tobacco: Never    Tobacco comments:     1-2 cigarettes per day   Substance and Sexual Activity    Alcohol use: Yes     Alcohol/week: 2.0 standard drinks     Types: 2 Cans of beer per week     Comment: every night    Drug use: No    Sexual activity: Not Currently   Social History Narrative    Patient' nephew is currently living with him in his apartment. Patient's sister Viry ([phone]) helps manage patient's care.            6/3/21    Patient is no longer staying with family. Patient is currently staying at the [facility] and working on getting into their program for more supportive housing.      Social Determinants of Health     Financial Resource Strain: Low Risk     Difficulty of Paying Living Expenses: Not hard at all   Food Insecurity: Unknown    Worried About Running Out of Food in the Last Year: Patient refused    Ran Out of Food in the Last Year: Patient refused   Transportation Needs: No Transportation Needs    Lack of Transportation (Medical): No    Lack of Transportation (Non-Medical): No   Physical Activity: Unknown    Days of Exercise per Week: Patient refused    Minutes of Exercise per Session: Patient refused   Stress: Stress Concern Present    Feeling of Stress : Rather much   Social Connections: Socially Isolated    Frequency of Communication with Friends and Family: More than three times a week    Frequency of Social Gatherings with Friends and Family: Twice a week    Attends [employer] Services: Never    Active Member of Clubs or  Organizations: No    Attends Club or Organization Meetings: Patient refused    Marital Status: Never    Housing Stability: Low Risk     Unable to Pay for Housing in the Last Year: No    Number of Places Lived in the Last Year: 2    Unstable Housing in the Last Year: No       MEDICATIONS & ALLERGIES:     Current Outpatient Medications on File Prior to Visit   Medication Sig Dispense Refill    albuterol (PROVENTIL/VENTOLIN HFA) 90 mcg/actuation inhaler Inhale 1-2 puffs into the lungs every 6 (six) hours as needed for Wheezing. Rescue 8.5 g 2    albuterol-ipratropium (DUO-NEB) 2.5 mg-0.5 mg/3 mL nebulizer solution Use 1 vial (3 mLs) by nebulization every 4 (four) hours as needed for Wheezing or Shortness of Breath. Rescue 180 mL 2    bismuth subsalicylate (PEPTO BISMOL) 262 mg/15 mL suspension Take 30 mLs by mouth daily as needed for Indigestion.      budesonide-glycopyr-formoterol (BREZTRI AEROSPHERE) 160-9-4.8 mcg/actuation HFAA Inhale 2 puffs into the lungs 2 (two) times daily. 10.7 g 11    calcium carbonate (TUMS ORAL) Take 2 tablets by mouth daily as needed (Heartburn).      ferrous sulfate 325 (65 FE) MG EC tablet Take 1 tablet (325 mg total) by mouth once daily. 30 tablet 1    folic acid (FOLVITE) 1 MG tablet Take 1 tablet (1 mg total) by mouth once daily. 30 tablet 2    furosemide (LASIX) 20 MG tablet Take 2 tablets (40 mg total) by mouth once daily. 60 tablet 1    nicotine (NICODERM CQ) 7 mg/24 hr Place 1 patch onto the skin once daily. 30 patch 2    [DISCONTINUED] tiotropium bromide (SPIRIVA RESPIMAT) 2.5 mcg/actuation inhaler Inhale 2 puffs into the lungs Daily. Controller 4 g 5     No current facility-administered medications on file prior to visit.        Review of patient's allergies indicates:   Allergen Reactions    Benazepril Swelling       OBJECTIVE:     Vital Signs:  There were no vitals filed for this visit.    There is no height or weight on file to calculate BMI.     Physical  Exam:  Physical Exam  HENT:      Head: Atraumatic.      Nose: Nose normal.   Cardiovascular:      Rate and Rhythm: Normal rate.   Pulmonary:      Effort: Pulmonary effort is normal.      Comments: Continuous supplemental oxygen in use 2L/NC  Abdominal:      General: Abdomen is flat.   Musculoskeletal:         General: Normal range of motion.   Skin:     General: Skin is warm and dry.      Capillary Refill: Capillary refill takes less than 2 seconds.   Neurological:      Mental Status: He is alert and oriented to person, place, and time.   Psychiatric:         Mood and Affect: Mood normal.       Laboratory  Lab Results   Component Value Date    WBC 10.56 12/09/2022    HGB 12.1 (L) 12/09/2022    HCT 40.4 12/09/2022    MCV 83 12/09/2022     12/09/2022     Lab Results   Component Value Date    INR 1.1 12/08/2022    INR 1.0 12/06/2022    INR 1.0 03/14/2022     Lab Results   Component Value Date    HGBA1C 5.7 (H) 02/10/2022     No results for input(s): POCTGLUCOSE in the last 72 hours.    Diagnostic Results:  N/A      Medications Reconciliation:   I have reconciled the patient's home medications and discharge medications with the patient/family. I have updated all changes.  Refer to After-Visit Medication List.    ASSESSMENT & PLAN:     Tobacco Abuse  Pt still smoking and using home 02.   Counseling given for smoking cessation and no smoking around oxygen  Pt using Nicotine patch daily    COPD  Pt taking prescribed antibiotics, nebulizers, inhalers and using home 02  Pt still smoking 1-2 cigarettes per day.   Counseling given for smoking cessation.       Were controlled substances prescribed?  No      Patient Instructions Given:  - Continue all medications, treatments and therapies as ordered.   - Follow all instructions, recommendations as discussed.  - Maintain Safety Precautions at all times.  - Attend all medical appointments as scheduled.  - For worsening symptoms: call Primary Care Physician or Nurse  Practitioner.  - For emergencies, call 911 or immediately report to the nearest emergency room.    After Visit Medication List :     Medication List            Accurate as of November 23, 2022 11:59 PM. If you have any questions, ask your nurse or doctor.                CONTINUE taking these medications      albuterol 90 mcg/actuation inhaler  Commonly known as: PROVENTIL/VENTOLIN HFA  Inhale 1-2 puffs into the lungs every 6 (six) hours as needed for Wheezing. Rescue     albuterol-ipratropium 2.5 mg-0.5 mg/3 mL nebulizer solution  Commonly known as: DUO-NEB  Use 1 vial (3 mLs) by nebulization every 4 (four) hours as needed for Wheezing or Shortness of Breath. Rescue     bismuth subsalicylate 262 mg/15 mL suspension  Commonly known as: PEPTO BISMOL     BREZTRI AEROSPHERE 160-9-4.8 mcg/actuation Hfaa  Generic drug: budesonide-glycopyr-formoterol  Inhale 2 puffs into the lungs 2 (two) times daily.     ferrous sulfate 325 (65 FE) MG EC tablet  Take 1 tablet (325 mg total) by mouth once daily.     folic acid 1 MG tablet  Commonly known as: FOLVITE  Take 1 tablet (1 mg total) by mouth once daily.     furosemide 20 MG tablet  Commonly known as: LASIX  Take 2 tablets (40 mg total) by mouth once daily.     nicotine 7 mg/24 hr  Commonly known as: NICODERM CQ  Place 1 patch onto the skin once daily.     NIFEdipine 30 MG (OSM) 24 hr tablet  Commonly known as: PROCARDIA-XL  Take 1 tablet (30 mg total) by mouth once daily.     ONCOVITE tablet  Generic drug: multivitamin  Take 1 tablet by mouth once daily.     predniSONE 20 MG tablet  Commonly known as: DELTASONE  Take 2 tablets (40 mg total) by mouth once daily. for 5 days     thiamine 100 MG tablet  Take 1 tablet (100 mg total) by mouth once daily.     [facility]            Future Appointments   Date Time Provider Department Center   12/19/2022  2:30 PM Delphine Orona MD [facility] MED CLN [facility] PCW   1/31/2023  9:30 AM Carlo Pascal MD [facility] PULMS [facility]     Risks  of environmental exposure to coronavirus discussed including: social distancing, hand hygiene, and limiting departures from the home for necessities only. Reports understanding and willingness to comply.     Signature:    Susan Rae MSN, FNP-C  [facility]

## 2022-12-15 ENCOUNTER — PES CALL (OUTPATIENT)
Dept: ADMINISTRATIVE | Facility: CLINIC | Age: 62
End: 2022-12-15
Payer: MEDICARE

## 2022-12-16 ENCOUNTER — DOCUMENTATION ONLY (OUTPATIENT)
Dept: CASE MANAGEMENT | Facility: HOSPITAL | Age: 62
End: 2022-12-16

## 2022-12-19 ENCOUNTER — OFFICE VISIT (OUTPATIENT)
Dept: PRIMARY CARE CLINIC | Facility: CLINIC | Age: 62
End: 2022-12-19
Payer: MEDICARE

## 2022-12-19 VITALS
DIASTOLIC BLOOD PRESSURE: 70 MMHG | OXYGEN SATURATION: 93 % | HEIGHT: 68 IN | TEMPERATURE: 98 F | HEART RATE: 90 BPM | RESPIRATION RATE: 14 BRPM | SYSTOLIC BLOOD PRESSURE: 157 MMHG | BODY MASS INDEX: 19.21 KG/M2

## 2022-12-19 DIAGNOSIS — J42 CHRONIC BRONCHITIS, UNSPECIFIED CHRONIC BRONCHITIS TYPE: Chronic | ICD-10-CM

## 2022-12-19 PROCEDURE — 3008F PR BODY MASS INDEX (BMI) DOCUMENTED: ICD-10-PCS | Mod: CPTII,S$GLB,, | Performed by: INTERNAL MEDICINE

## 2022-12-19 PROCEDURE — 3044F PR MOST RECENT HEMOGLOBIN A1C LEVEL <7.0%: ICD-10-PCS | Mod: CPTII,S$GLB,, | Performed by: INTERNAL MEDICINE

## 2022-12-19 PROCEDURE — 3078F DIAST BP <80 MM HG: CPT | Mod: CPTII,S$GLB,, | Performed by: INTERNAL MEDICINE

## 2022-12-19 PROCEDURE — 90732 PNEUMOCOCCAL POLYSACCHARIDE VACCINE 23-VALENT =>2YO SQ IM: ICD-10-PCS | Mod: S$GLB,,, | Performed by: INTERNAL MEDICINE

## 2022-12-19 PROCEDURE — 1111F DSCHRG MED/CURRENT MED MERGE: CPT | Mod: CPTII,S$GLB,, | Performed by: INTERNAL MEDICINE

## 2022-12-19 PROCEDURE — 3077F PR MOST RECENT SYSTOLIC BLOOD PRESSURE >= 140 MM HG: ICD-10-PCS | Mod: CPTII,S$GLB,, | Performed by: INTERNAL MEDICINE

## 2022-12-19 PROCEDURE — 90732 PPSV23 VACC 2 YRS+ SUBQ/IM: CPT | Mod: S$GLB,,, | Performed by: INTERNAL MEDICINE

## 2022-12-19 PROCEDURE — 99212 OFFICE O/P EST SF 10 MIN: CPT | Mod: S$GLB,,, | Performed by: INTERNAL MEDICINE

## 2022-12-19 PROCEDURE — 3008F BODY MASS INDEX DOCD: CPT | Mod: CPTII,S$GLB,, | Performed by: INTERNAL MEDICINE

## 2022-12-19 PROCEDURE — 3077F SYST BP >= 140 MM HG: CPT | Mod: CPTII,S$GLB,, | Performed by: INTERNAL MEDICINE

## 2022-12-19 PROCEDURE — 3078F PR MOST RECENT DIASTOLIC BLOOD PRESSURE < 80 MM HG: ICD-10-PCS | Mod: CPTII,S$GLB,, | Performed by: INTERNAL MEDICINE

## 2022-12-19 PROCEDURE — G0009 PNEUMOCOCCAL POLYSACCHARIDE VACCINE 23-VALENT =>2YO SQ IM: ICD-10-PCS | Mod: S$GLB,,, | Performed by: INTERNAL MEDICINE

## 2022-12-19 PROCEDURE — G0009 ADMIN PNEUMOCOCCAL VACCINE: HCPCS | Mod: S$GLB,,, | Performed by: INTERNAL MEDICINE

## 2022-12-19 PROCEDURE — 1159F MED LIST DOCD IN RCRD: CPT | Mod: CPTII,S$GLB,, | Performed by: INTERNAL MEDICINE

## 2022-12-19 PROCEDURE — 99212 PR OFFICE/OUTPT VISIT, EST, LEVL II, 10-19 MIN: ICD-10-PCS | Mod: S$GLB,,, | Performed by: INTERNAL MEDICINE

## 2022-12-19 PROCEDURE — 3044F HG A1C LEVEL LT 7.0%: CPT | Mod: CPTII,S$GLB,, | Performed by: INTERNAL MEDICINE

## 2022-12-19 PROCEDURE — 1111F PR DISCHARGE MEDS RECONCILED W/ CURRENT OUTPATIENT MED LIST: ICD-10-PCS | Mod: CPTII,S$GLB,, | Performed by: INTERNAL MEDICINE

## 2022-12-19 PROCEDURE — 1159F PR MEDICATION LIST DOCUMENTED IN MEDICAL RECORD: ICD-10-PCS | Mod: CPTII,S$GLB,, | Performed by: INTERNAL MEDICINE

## 2022-12-19 NOTE — PROGRESS NOTES
"Primary Care Provider Appointment - [facility]  SHARED NOTE: Gracie "Jasmyne" Debbie (MD), Dr Orona (Attending Remote)    Subjective:      Patient ID: Baltazar Mccray is a 62 y.o. male with COPD on  2LNC home O2, Afib, HTN, HFpEF, and seizure disorder      Chief Complaint: Hospital Follow-up, establish care    Prior to this visit, patient's last encounter with PCP was Visit date not found. Patient was referred by inpatient case management. He has monthly hospital admissions and uncontrolled COPD. Recent admissions for ICU level care following COPD exacerbations.    Mr. Ledesma is a 63yo M w/ COPD on 1LNC at home, Afib, HTN, HFpEF, and remote h/o of seizure disorder (haven't been on medication nor had seizures in years) who presents today to establish care. He was recently hospitalized with shortness of breath on 12/05/2022. In the hospital, he was admitted for a COPD exacerbation and was unstable with Afib.     He is now currently on a stable medical regiment as below:  - theragran multivitamin tablet, thiamine 100mg tablet, folic acid 1mg, ferrous sulfate 325mg  - eliquis (apixaban) 5mg BID  - diltiazem 90mg BID  - protonix 40mg  - albuterol inhaler  - budesonide inhaler 2 puffs BID  - lasix 20mg BID  - nicoderm 7mg/24hr patch  Unclear if he is taking any of these medications.    Every night bipap, budenoside every night as well, 1L oxygen? He reports that he was taking inhalers PRN but will now take the budesonide inhaler every night, he uses the bipap every night as well. He used to smoke a pack a day for 34 years, but 10month ago switched to the patch and only smokes "socially" one cigarette every other day. He is working on stopping completely. But he continues to smoke.    He reports that he occasionally has melena "every blue simon" but not consistently and it only lasts a day at a time. He has not had any since being started on the protonix. He denies abdomen pain or changes in bowel movement. Denies fevers, " chills, activity change, leg swelling, skin changes. He also reports having a generalized headache occasionally that improves with tylenol. He reports needing prescription glasses and will make an appt with his eye  next month.    He lives at home with his 9mo cat. He is on disability and does not work, but has a good support system. His brother visits every other day, his sister checks in regularly, and he spends time with his neighbors often playing dominoes. Able to walk, but in a wheelchair today bc has exertional dyspnea, on 1LNC currently.    No h/o diabetes. His most recently A1c was on 2/10/2022 which was 5.7.    He is amenable for a pneumococcal vaccine today as well as wanting a colonoscopy for colorectal cancer screening. Before scheduling him for the colorectal screening, we wish to see him back in clinic in 2 days with all of his medications and inhalers. At this appointment, we can consider talking about an addition of a statin as well.    FU in 2 days.    Past Surgical History:   Procedure Laterality Date    ESOPHAGOGASTRODUODENOSCOPY N/A 2/11/2022    Procedure: EGD (ESOPHAGOGASTRODUODENOSCOPY);  Surgeon: Cain Ortega MD;  Location: [address]);  Service: Endoscopy;  Laterality: N/A;    ESOPHAGOGASTRODUODENOSCOPY N/A 9/15/2022    Procedure: EGD (ESOPHAGOGASTRODUODENOSCOPY);  Surgeon: Leonid Chavez MD;  Location: [address]);  Service: Endoscopy;  Laterality: N/A;  PA-50 - 2nd floor  vacc-wears 2L o2-inst mail and verbal-clears 4 hrs prior-tb  8/30 pt rescheduled; updated instructions mailed-st    LEFT HEART CATHETERIZATION  4/23/2020    Procedure: Left heart cath;  Surgeon: Nic Pollack MD;  Location: [facility] CATH LAB;  Service: Cardiology;;       Past Medical History:   Diagnosis Date    Aspiration pneumonia 5/30/2021    Asthma     Atrial fibrillation with rapid ventricular response     CHF (congestive heart failure)     COPD (chronic obstructive pulmonary disease)     home  O2 at night only    Coronary artery disease     Hypertension     Lung nodule     Pneumonia     Seizures        Social History     Socioeconomic History    Marital status: Single   Tobacco Use    Smoking status: Some Days     Packs/day: 0.25     Types: Cigarettes    Smokeless tobacco: Never    Tobacco comments:     1-2 cigarettes per day   Substance and Sexual Activity    Alcohol use: Yes     Alcohol/week: 2.0 standard drinks     Types: 2 Cans of beer per week     Comment: every night    Drug use: No    Sexual activity: Not Currently   Social History Narrative    Patient' nephew is currently living with him in his apartment. Patient's sister Viry ([phone]) helps manage patient's care.            6/3/21    Patient is no longer staying with family. Patient is currently staying at the [facility] and working on getting into their program for more supportive housing.      Social Determinants of Health     Financial Resource Strain: Low Risk     Difficulty of Paying Living Expenses: Not hard at all   Food Insecurity: Unknown    Worried About Running Out of Food in the Last Year: Patient refused    Ran Out of Food in the Last Year: Patient refused   Transportation Needs: No Transportation Needs    Lack of Transportation (Medical): No    Lack of Transportation (Non-Medical): No   Physical Activity: Unknown    Days of Exercise per Week: Patient refused    Minutes of Exercise per Session: Patient refused   Stress: Stress Concern Present    Feeling of Stress : Rather much   Social Connections: Socially Isolated    Frequency of Communication with Friends and Family: More than three times a week    Frequency of Social Gatherings with Friends and Family: Twice a week    Attends [employer] Services: Never    Active Member of Clubs or Organizations: No    Attends Club or Organization Meetings: Patient refused    Marital Status: Never    Housing Stability: Low Risk     Unable to Pay for Housing in the Last Year: No     "Number of Places Lived in the Last Year: 2    Unstable Housing in the Last Year: No       Review of Systems   Constitutional:  Negative for activity change, appetite change, chills, diaphoresis, fatigue, fever and unexpected weight change.   HENT:  Negative for congestion, dental problem, drooling, ear discharge, ear pain, facial swelling, hearing loss, mouth sores, nosebleeds, postnasal drip, rhinorrhea, sinus pressure, sinus pain, sneezing, sore throat, tinnitus, trouble swallowing and voice change.    Eyes: Negative.    Respiratory:  Positive for cough, choking, chest tightness and shortness of breath.    Cardiovascular:  Positive for chest pain and palpitations.   Gastrointestinal: Negative.    Genitourinary: Negative.    Musculoskeletal: Negative.    Skin: Negative.    Neurological:  Positive for headaches. Negative for dizziness, tremors, seizures, syncope, facial asymmetry, speech difficulty, weakness, light-headedness and numbness.   Hematological:  Negative for adenopathy. Does not bruise/bleed easily.   Psychiatric/Behavioral: Negative.       Objective:   BP (!) 157/70 (BP Location: Right arm, Patient Position: Sitting, BP Method: Medium (Manual))   Pulse 90   Temp 98 °F (36.7 °C) (Oral)   Resp 14   Ht 5' 8" (1.727 m)   SpO2 (!) 93% Comment: o2 set @ 1  BMI 19.21 kg/m²     Physical Exam  Constitutional:       General: He is not in acute distress.     Appearance: Normal appearance. He is normal weight. He is not ill-appearing, toxic-appearing or diaphoretic.      Comments: In a wheelchain on 1L via nasal canula   HENT:      Head: Normocephalic and atraumatic.      Nose: Nose normal.   Eyes:      Extraocular Movements: Extraocular movements intact.      Conjunctiva/sclera: Conjunctivae normal.      Pupils: Pupils are equal, round, and reactive to light.   Cardiovascular:      Rate and Rhythm: Normal rate and regular rhythm.      Pulses: Normal pulses.      Heart sounds: Normal heart sounds. No murmur " heard.     Comments: No peripheral edema appreciated on exam  Pulmonary:      Effort: Pulmonary effort is normal. No respiratory distress.      Breath sounds: Normal breath sounds. No stridor. No wheezing, rhonchi or rales.      Comments: On 1L nasal canula, occasional cough  Chest:      Chest wall: No tenderness.   Abdominal:      General: Abdomen is flat. Bowel sounds are normal.      Palpations: Abdomen is soft.   Musculoskeletal:         General: Normal range of motion.      Cervical back: Normal range of motion.   Skin:     General: Skin is warm and dry.      Capillary Refill: Capillary refill takes less than 2 seconds.   Neurological:      General: No focal deficit present.      Mental Status: He is alert and oriented to person, place, and time. Mental status is at baseline.   Psychiatric:         Mood and Affect: Mood normal.         Behavior: Behavior normal.         Thought Content: Thought content normal.         Judgment: Judgment normal.           Lab Results   Component Value Date    WBC 10.56 12/09/2022    HGB 12.1 (L) 12/09/2022    HCT 40.4 12/09/2022     12/09/2022    CHOL 260 (H) 04/19/2020    TRIG 145 04/19/2020    HDL 91 (H) 04/19/2020    ALT 17 12/09/2022    AST 14 12/09/2022     12/09/2022    K 4.5 12/09/2022     12/09/2022    CREATININE 0.8 12/09/2022    BUN 20 12/09/2022    CO2 30 (H) 12/09/2022    TSH 1.137 12/05/2022    INR 1.1 12/08/2022    HGBA1C 5.7 (H) 02/10/2022       Current Outpatient Medications on File Prior to Visit   Medication Sig Dispense Refill    albuterol (PROVENTIL/VENTOLIN HFA) 90 mcg/actuation inhaler Inhale 1-2 puffs into the lungs every 6 (six) hours as needed for Wheezing. Rescue 8.5 g 2    albuterol-ipratropium (DUO-NEB) 2.5 mg-0.5 mg/3 mL nebulizer solution Use 1 vial (3 mLs) by nebulization every 4 (four) hours as needed for Wheezing or Shortness of Breath. Rescue 180 mL 2    apixaban (ELIQUIS) 5 mg Tab Take 1 tablet (5 mg total) by mouth 2 (two)  times daily. 60 tablet 2    bismuth subsalicylate (PEPTO BISMOL) 262 mg/15 mL suspension Take 30 mLs by mouth daily as needed for Indigestion.      budesonide-glycopyr-formoterol (BREZTRI AEROSPHERE) 160-9-4.8 mcg/actuation HFAA Inhale 2 puffs into the lungs 2 (two) times daily. 10.7 g 11    calcium carbonate (TUMS ORAL) Take 2 tablets by mouth daily as needed (Heartburn).      diltiaZEM (CARDIZEM) 90 MG tablet Take 2 tablets (180 mg total) by mouth once daily. 60 tablet 11    ferrous sulfate 325 (65 FE) MG EC tablet Take 1 tablet (325 mg total) by mouth once daily. 30 tablet 1    folic acid (FOLVITE) 1 MG tablet Take 1 tablet (1 mg total) by mouth once daily. 30 tablet 2    furosemide (LASIX) 20 MG tablet Take 2 tablets (40 mg total) by mouth once daily. 60 tablet 1    multivitamin (THERAGRAN) tablet Take 1 tablet by mouth once daily. 30 tablet 2    nicotine (NICODERM CQ) 7 mg/24 hr Place 1 patch onto the skin once daily. 30 patch 2    pantoprazole (PROTONIX) 40 MG tablet Take 1 tablet (40 mg total) by mouth once daily. 30 tablet 11    predniSONE (DELTASONE) 20 MG tablet Take 2 tablets (40 mg total) by mouth once daily. 2 tablet 0    thiamine 100 MG tablet Take 1 tablet (100 mg total) by mouth once daily. 30 tablet 2    [DISCONTINUED] tiotropium bromide (SPIRIVA RESPIMAT) 2.5 mcg/actuation inhaler Inhale 2 puffs into the lungs Daily. Controller 4 g 5     No current facility-administered medications on file prior to visit.         Assessment:   62 y.o. male with multiple co-morbid illnesses here to follow-up with PCP and continue work-up of chronic issues    Plan:     Problem List Items Addressed This Visit          Pulmonary    Chronic obstructive pulmonary disease (Chronic)     Frequent hospital admissions for COPD, with ICU-level care  Patient continues to smoke  Needs to have smoking cessation involved  Unclear if he is following instructions for inhalers and oxygen  Folow-up in 2 days with all home meds              Health Maintenance         Date Due Completion Date    TETANUS VACCINE Never done ---    High Dose Statin Never done ---    Colorectal Cancer Screening Never done ---    Shingles Vaccine (1 of 2) Never done ---    Pneumococcal Vaccines (Age 0-64) (2 - PPSV23 if available, else PCV20) 11/22/2018 11/22/2017- TODAY    Hemoglobin A1c (Prediabetes) 02/10/2023 2/10/2022    Lipid Panel 04/19/2025 4/19/2020            Future Appointments   Date Time Provider Department Center   12/21/2022 10:30 AM NURSE, [facility] CLINIC [facility] MED CLN Sherif Valdovinos PCW   1/3/2023  8:00 AM Suasn Rae, NP [address]   1/31/2023  9:30 AM Carlo Pascal [facility]         Follow up in about 2 days (around 12/21/2022). Total clinical care time was 60 min to [facility] CARE    MD Delphine Alvarez MD/MPH  [facility]  [phone] spectralink

## 2022-12-19 NOTE — ASSESSMENT & PLAN NOTE
Frequent hospital admissions for COPD, with ICU-level care  · Patient continues to smoke  · Needs to have smoking cessation involved  · Unclear if he is following instructions for inhalers and oxygen  · Folow-up in 2 days with all home meds

## 2022-12-21 ENCOUNTER — TELEPHONE (OUTPATIENT)
Dept: PULMONOLOGY | Facility: CLINIC | Age: 62
End: 2022-12-21
Payer: MEDICARE

## 2022-12-21 ENCOUNTER — CLINICAL SUPPORT (OUTPATIENT)
Dept: PRIMARY CARE CLINIC | Facility: CLINIC | Age: 62
End: 2022-12-21
Payer: MEDICARE

## 2022-12-21 VITALS
BODY MASS INDEX: 20.96 KG/M2 | TEMPERATURE: 99 F | SYSTOLIC BLOOD PRESSURE: 118 MMHG | WEIGHT: 138.31 LBS | OXYGEN SATURATION: 92 % | HEART RATE: 80 BPM | DIASTOLIC BLOOD PRESSURE: 68 MMHG | HEIGHT: 68 IN

## 2022-12-21 DIAGNOSIS — I48.0 PAROXYSMAL ATRIAL FIBRILLATION: ICD-10-CM

## 2022-12-21 DIAGNOSIS — J44.9 CHRONIC OBSTRUCTIVE PULMONARY DISEASE, UNSPECIFIED COPD TYPE: ICD-10-CM

## 2022-12-21 DIAGNOSIS — Z79.899 POLYPHARMACY: ICD-10-CM

## 2022-12-21 DIAGNOSIS — Z72.0 TOBACCO ABUSE: ICD-10-CM

## 2022-12-21 DIAGNOSIS — K25.9 GASTRIC ULCER, UNSPECIFIED CHRONICITY, UNSPECIFIED WHETHER GASTRIC ULCER HEMORRHAGE OR PERFORATION PRESENT: Primary | ICD-10-CM

## 2022-12-21 DIAGNOSIS — E78.5 HYPERLIPIDEMIA, UNSPECIFIED HYPERLIPIDEMIA TYPE: ICD-10-CM

## 2022-12-21 DIAGNOSIS — J42 CHRONIC BRONCHITIS, UNSPECIFIED CHRONIC BRONCHITIS TYPE: ICD-10-CM

## 2022-12-21 DIAGNOSIS — Z99.89 BIPAP (BIPHASIC POSITIVE AIRWAY PRESSURE) DEPENDENCE: ICD-10-CM

## 2022-12-21 DIAGNOSIS — I50.32 CHRONIC DIASTOLIC CONGESTIVE HEART FAILURE: ICD-10-CM

## 2022-12-21 PROCEDURE — 99211 OFF/OP EST MAY X REQ PHY/QHP: CPT | Mod: S$GLB,,, | Performed by: INTERNAL MEDICINE

## 2022-12-21 PROCEDURE — 99211 PR OFFICE/OUTPT VISIT, EST, LEVL I: ICD-10-PCS | Mod: S$GLB,,, | Performed by: INTERNAL MEDICINE

## 2022-12-21 RX ORDER — FOLIC ACID 1 MG/1
1 TABLET ORAL DAILY
Qty: 30 TABLET | Refills: 2 | Status: SHIPPED | OUTPATIENT
Start: 2022-12-21 | End: 2023-03-20 | Stop reason: SDUPTHER

## 2022-12-21 RX ORDER — MULTIVIT-MIN/FA/LYCOPEN/LUTEIN .4-300-25
1 TABLET ORAL DAILY
Qty: 30 TABLET | Refills: 2 | Status: SHIPPED | OUTPATIENT
Start: 2022-12-21 | End: 2023-04-06 | Stop reason: SDUPTHER

## 2022-12-21 RX ORDER — ALBUTEROL SULFATE 90 UG/1
1-2 AEROSOL, METERED RESPIRATORY (INHALATION) EVERY 6 HOURS PRN
Qty: 8.5 G | Refills: 2 | Status: SHIPPED | OUTPATIENT
Start: 2022-12-21 | End: 2023-02-06 | Stop reason: SDUPTHER

## 2022-12-21 RX ORDER — FUROSEMIDE 20 MG/1
20 TABLET ORAL DAILY
Qty: 90 TABLET | Refills: 3 | Status: SHIPPED | OUTPATIENT
Start: 2022-12-21 | End: 2023-01-28

## 2022-12-21 RX ORDER — BUDESONIDE, GLYCOPYRROLATE, AND FORMOTEROL FUMARATE 160; 9; 4.8 UG/1; UG/1; UG/1
2 AEROSOL, METERED RESPIRATORY (INHALATION) 2 TIMES DAILY
Qty: 10.7 G | Refills: 11 | Status: ON HOLD | OUTPATIENT
Start: 2022-12-21 | End: 2023-01-10 | Stop reason: HOSPADM

## 2022-12-21 RX ORDER — FERROUS SULFATE 325(65) MG
325 TABLET, DELAYED RELEASE (ENTERIC COATED) ORAL DAILY
Qty: 30 TABLET | Refills: 1 | Status: SHIPPED | OUTPATIENT
Start: 2022-12-21 | End: 2023-02-06 | Stop reason: SDUPTHER

## 2022-12-21 RX ORDER — IPRATROPIUM BROMIDE AND ALBUTEROL SULFATE 2.5; .5 MG/3ML; MG/3ML
3 SOLUTION RESPIRATORY (INHALATION) EVERY 4 HOURS PRN
Qty: 180 ML | Refills: 2 | Status: SHIPPED | OUTPATIENT
Start: 2022-12-21 | End: 2023-12-21

## 2022-12-21 RX ORDER — PANTOPRAZOLE SODIUM 40 MG/1
40 TABLET, DELAYED RELEASE ORAL DAILY
Qty: 30 TABLET | Refills: 11 | Status: SHIPPED | OUTPATIENT
Start: 2022-12-21 | End: 2023-12-21

## 2022-12-21 RX ORDER — DILTIAZEM HYDROCHLORIDE 90 MG/1
180 TABLET, FILM COATED ORAL DAILY
Qty: 180 TABLET | Refills: 3 | Status: SHIPPED | OUTPATIENT
Start: 2022-12-21 | End: 2023-03-31 | Stop reason: SDUPTHER

## 2022-12-21 RX ORDER — LANOLIN ALCOHOL/MO/W.PET/CERES
100 CREAM (GRAM) TOPICAL DAILY
Qty: 30 TABLET | Refills: 2 | Status: SHIPPED | OUTPATIENT
Start: 2022-12-21 | End: 2023-03-20 | Stop reason: SDUPTHER

## 2022-12-21 RX ORDER — ATORVASTATIN CALCIUM 40 MG/1
40 TABLET, FILM COATED ORAL DAILY
Qty: 90 TABLET | Refills: 3 | Status: SHIPPED | OUTPATIENT
Start: 2022-12-21 | End: 2023-12-21

## 2022-12-21 RX ORDER — NICOTINE 7MG/24HR
1 PATCH, TRANSDERMAL 24 HOURS TRANSDERMAL DAILY
Qty: 30 PATCH | Refills: 2 | Status: SHIPPED | OUTPATIENT
Start: 2022-12-21 | End: 2023-01-08

## 2022-12-21 NOTE — TELEPHONE ENCOUNTER
Spoke with patient  in regards ton transportation.  Patient stated no ones has arrived to pick him up for scheduled appointment today [facility]  for 10:30 am.   I advised  I will forward this message to correct staff.  Patient verbalized he understands.

## 2022-12-21 NOTE — PROGRESS NOTES
[facility] NURSE VISIT NOTE    PCP/MD Attestation     HPI: Patient here for Pill packing.     Established care this week. Has high hospital utilization history.    He believes he has a spacer at home, but is unclear if he knows how to use it. Is willing to be trained today.    Proposed pill packs    Morning Packs   Diltiazam 180 mg (2 tablets)   Apixaban 5 mg   Furosemide 20 mg   Pantoprazole 40 mg   Iron 325mg      Evening packs   Oncovite   Thiamine 100 mg   Apixiban 5 mg   Atorvastatin 40 mg    A/P:  Problem List Items Addressed This Visit          Pulmonary    Chronic obstructive pulmonary disease (Chronic)    Relevant Medications    albuterol (PROVENTIL/VENTOLIN HFA) 90 mcg/actuation inhaler    albuterol-ipratropium (DUO-NEB) 2.5 mg-0.5 mg/3 mL nebulizer solution    budesonide-glycopyr-formoterol (BREZTRI AEROSPHERE) 160-9-4.8 mcg/actuation HFAA    ferrous sulfate 325 (65 FE) MG EC tablet    folic acid (FOLVITE) 1 MG tablet    multivit-min-FA-lycopen-lutein (CERTAVITE SENIOR) 0.4 mg-300 mcg- 250 mcg Tab    thiamine 100 MG tablet    Other Relevant Orders    SPACER WITH MASK FOR HOME USE    BiPAP (biphasic positive airway pressure) dependence     Unclear if has adequate supplies  Address at each appt            Cardiac/Vascular    Chronic diastolic congestive heart failure    Relevant Medications    diltiaZEM (CARDIZEM) 90 MG tablet    furosemide (LASIX) 20 MG tablet    Atrial fibrillation    Relevant Medications    apixaban (ELIQUIS) 5 mg Tab       GI    Gastric ulcer - Primary    Relevant Medications    pantoprazole (PROTONIX) 40 MG tablet       Palliative Care    Polypharmacy     Complex medication regimen, high hospital utilization, uncontrolled chronic issues  Pill packs          Other Visit Diagnoses       Hyperlipidemia, unspecified hyperlipidemia type        Relevant Medications    atorvastatin (LIPITOR) 40 MG tablet    Tobacco abuse        Relevant Medications    nicotine (NICODERM CQ) 7 mg/24 hr           Delphine Orona MD/MPH  [facility]  [phone] spectralink

## 2022-12-21 NOTE — PROGRESS NOTES
New to pillpacks 12/21/22      Adherence packed using Dispill for 14 days:  (Compressed monthly)    Morning Pocket:  Diltiazam 180 mg (2 tablets)   Eliquis 5 mg   Furosemide 20 mg  Ferrous Sulfate 325mg   Pantoprazole 40 mg      Evening Pocket:  Atorvastatin 40 mg    Eliquis 5 mg    Folic Acid 1mg   MVI 50+   Thiamine 100 mg     Skipped AM dose today per MDO - unused pocket. Patient waited with MD until packs were completed and MDO picked up. OPW to order Breztri and nebulizer solution to  tomorrow, 12/22/22. TTN

## 2022-12-21 NOTE — TELEPHONE ENCOUNTER
----- Message from Rubi Amezcua sent at 12/21/2022  8:40 AM CST -----  Regarding: Ride to Office  Contact: Pt @ [phone]  Pt is calling because he stated that office was to send transportation to his house this am to bring him to 's appt. Asking for urgent call back, no one is there to pick him up

## 2022-12-26 ENCOUNTER — DOCUMENTATION ONLY (OUTPATIENT)
Dept: CASE MANAGEMENT | Facility: HOSPITAL | Age: 62
End: 2022-12-26
Payer: MEDICARE

## 2022-12-28 ENCOUNTER — OFFICE VISIT (OUTPATIENT)
Dept: PRIMARY CARE CLINIC | Facility: CLINIC | Age: 62
End: 2022-12-28
Payer: MEDICARE

## 2022-12-28 VITALS
TEMPERATURE: 99 F | WEIGHT: 135.81 LBS | HEIGHT: 68 IN | OXYGEN SATURATION: 93 % | HEART RATE: 86 BPM | BODY MASS INDEX: 20.58 KG/M2 | SYSTOLIC BLOOD PRESSURE: 122 MMHG | DIASTOLIC BLOOD PRESSURE: 58 MMHG

## 2022-12-28 DIAGNOSIS — J44.9 CHRONIC OBSTRUCTIVE PULMONARY DISEASE, UNSPECIFIED COPD TYPE: Primary | ICD-10-CM

## 2022-12-28 DIAGNOSIS — J44.9 PULMONARY CACHEXIA DUE TO CHRONIC OBSTRUCTIVE PULMONARY DISEASE: ICD-10-CM

## 2022-12-28 DIAGNOSIS — J41.0 SMOKERS' COUGH: ICD-10-CM

## 2022-12-28 DIAGNOSIS — R64 PULMONARY CACHEXIA DUE TO CHRONIC OBSTRUCTIVE PULMONARY DISEASE: ICD-10-CM

## 2022-12-28 DIAGNOSIS — I50.30 HEART FAILURE WITH PRESERVED EJECTION FRACTION, UNSPECIFIED HF CHRONICITY: ICD-10-CM

## 2022-12-28 PROCEDURE — 99211 PR OFFICE/OUTPT VISIT, EST, LEVL I: ICD-10-PCS | Mod: 25,S$GLB,, | Performed by: INTERNAL MEDICINE

## 2022-12-28 PROCEDURE — 1159F MED LIST DOCD IN RCRD: CPT | Mod: CPTII,S$GLB,, | Performed by: INTERNAL MEDICINE

## 2022-12-28 PROCEDURE — 3044F HG A1C LEVEL LT 7.0%: CPT | Mod: CPTII,S$GLB,, | Performed by: INTERNAL MEDICINE

## 2022-12-28 PROCEDURE — 94640 AIRWAY INHALATION TREATMENT: CPT | Mod: S$GLB,,, | Performed by: INTERNAL MEDICINE

## 2022-12-28 PROCEDURE — 94640 PR INHAL RX, AIRWAY OBST/DX SPUTUM INDUCT: ICD-10-PCS | Mod: S$GLB,,, | Performed by: INTERNAL MEDICINE

## 2022-12-28 PROCEDURE — 3074F SYST BP LT 130 MM HG: CPT | Mod: CPTII,S$GLB,, | Performed by: INTERNAL MEDICINE

## 2022-12-28 PROCEDURE — 3074F PR MOST RECENT SYSTOLIC BLOOD PRESSURE < 130 MM HG: ICD-10-PCS | Mod: CPTII,S$GLB,, | Performed by: INTERNAL MEDICINE

## 2022-12-28 PROCEDURE — 3078F DIAST BP <80 MM HG: CPT | Mod: CPTII,S$GLB,, | Performed by: INTERNAL MEDICINE

## 2022-12-28 PROCEDURE — 1159F PR MEDICATION LIST DOCUMENTED IN MEDICAL RECORD: ICD-10-PCS | Mod: CPTII,S$GLB,, | Performed by: INTERNAL MEDICINE

## 2022-12-28 PROCEDURE — 3008F BODY MASS INDEX DOCD: CPT | Mod: CPTII,S$GLB,, | Performed by: INTERNAL MEDICINE

## 2022-12-28 PROCEDURE — 3044F PR MOST RECENT HEMOGLOBIN A1C LEVEL <7.0%: ICD-10-PCS | Mod: CPTII,S$GLB,, | Performed by: INTERNAL MEDICINE

## 2022-12-28 PROCEDURE — 3008F PR BODY MASS INDEX (BMI) DOCUMENTED: ICD-10-PCS | Mod: CPTII,S$GLB,, | Performed by: INTERNAL MEDICINE

## 2022-12-28 PROCEDURE — 3078F PR MOST RECENT DIASTOLIC BLOOD PRESSURE < 80 MM HG: ICD-10-PCS | Mod: CPTII,S$GLB,, | Performed by: INTERNAL MEDICINE

## 2022-12-28 PROCEDURE — 99211 OFF/OP EST MAY X REQ PHY/QHP: CPT | Mod: 25,S$GLB,, | Performed by: INTERNAL MEDICINE

## 2022-12-28 RX ORDER — ARFORMOTEROL TARTRATE 15 UG/2ML
15 SOLUTION RESPIRATORY (INHALATION) 2 TIMES DAILY
Qty: 360 ML | Refills: 3 | Status: ON HOLD | OUTPATIENT
Start: 2022-12-28 | End: 2023-01-10 | Stop reason: SDUPTHER

## 2022-12-28 RX ORDER — IPRATROPIUM BROMIDE AND ALBUTEROL SULFATE 2.5; .5 MG/3ML; MG/3ML
3 SOLUTION RESPIRATORY (INHALATION)
Status: COMPLETED | OUTPATIENT
Start: 2022-12-28 | End: 2022-12-28

## 2022-12-28 RX ADMIN — IPRATROPIUM BROMIDE AND ALBUTEROL SULFATE 3 ML: 2.5; .5 SOLUTION RESPIRATORY (INHALATION) at 11:12

## 2022-12-28 NOTE — ASSESSMENT & PLAN NOTE
Poor compliance with inhalers, may not have the dexterity or strength to use hand-held inhalers  · Start brovana  · Needs PA

## 2022-12-28 NOTE — PROGRESS NOTES
"Primary Care Provider Appointment - [facility]  SHARED NOTE: Gracie "Jasmyne" Debbie (MD Trainee), Dr Orona (Attending)    Subjective:      Patient ID: Baltazar Mccray is a 62 y.o. male with COPD on 2LNC home O2, Afib, HTN, HFpEF, and seizure disorder      Chief Complaint: Follow-up    Prior to this visit, patient's last encounter with PCP was 12/21/2022.    He reports that he was taking inhalers PRN but will now take the budesonide inhaler every night, he uses the bipap every night as well. He used to smoke a pack a day for 34 years, but 10month ago (February 2022) switched to the patch and only smokes "socially" one cigarette occasionally. He reports not smoking since seeing him last week.     He does report that he struggles to breathe every night and considers calling an ambulance during this time. He has recently started the habit of using his nebulizer every night when he experiences these symptome. He uses his duoneb during this time, every night, and reports that it takes about 30min before he starts feeling better. Once he feels better he realizes that he does not need to call an ambulance.     His cat keeps him comfort whenever he is feeling shortness of breath. He lives at home with his 9mo cat, Betsy. He is on disability and does not work, but has a good support system. His brother visits every other day, his sister checks in regularly, and he spends time with his neighbors often playing dominoes. Able to walk, on 1LNC currently.    He reports that since starting the pillpack, he's found that taking his medication is a lot easier.    Adherence packed using Dispill for 14 days:  (Compressed monthly)     Morning Pocket:  Diltiazam 180 mg (2 tablets)   Eliquis 5 mg   Furosemide 20 mg  Ferrous Sulfate 325mg   Pantoprazole 40 mg       Evening Pocket:  Atorvastatin 40 mg    Eliquis 5 mg    Folic Acid 1mg   MVI 50+   Thiamine 100 mg       Today, he is amenable for his tetanus vaccine. We did a duoneb today in " clinic and will authorize Danya for him.     FU in 1 week to monitor breathing and for another duoneb in clinic. Pt was told that if he feels short of breathe and it us during business hours, he can come to our clinic rather than going to the ED. All questions were answered.    Past Surgical History:   Procedure Laterality Date    ESOPHAGOGASTRODUODENOSCOPY N/A 2/11/2022    Procedure: EGD (ESOPHAGOGASTRODUODENOSCOPY);  Surgeon: Cain Ortega MD;  Location: [facility] ([address] [place] [address]);  Service: Endoscopy;  Laterality: N/A;    ESOPHAGOGASTRODUODENOSCOPY N/A 9/15/2022    Procedure: EGD (ESOPHAGOGASTRODUODENOSCOPY);  Surgeon: Leonid Chavez MD;  Location: [facility] ([address] [place] [address]);  Service: Endoscopy;  Laterality: N/A;  PA-50 - 2nd floor  vacc-wears 2L o2-inst mail and verbal-clears 4 hrs prior-tb  8/30 pt rescheduled; updated instructions mailed-    LEFT HEART CATHETERIZATION  4/23/2020    Procedure: Left heart cath;  Surgeon: Nic Pollack MD;  Location: [facility] CATH LAB;  Service: Cardiology;;       Past Medical History:   Diagnosis Date    Aspiration pneumonia 5/30/2021    Asthma     Atrial fibrillation with rapid ventricular response     CHF (congestive heart failure)     COPD (chronic obstructive pulmonary disease)     home O2 at night only    Coronary artery disease     Hypertension     Lung nodule     Pneumonia     Seizures        Social History     Socioeconomic History    Marital status: Single   Tobacco Use    Smoking status: Some Days     Packs/day: 0.25     Types: Cigarettes    Smokeless tobacco: Never    Tobacco comments:     1-2 cigarettes per day   Substance and Sexual Activity    Alcohol use: Yes     Alcohol/week: 2.0 standard drinks     Types: 2 Cans of beer per week     Comment: every night    Drug use: No    Sexual activity: Not Currently   Social History Narrative    Patient' nephew is currently living with him in his apartment. Patient's sister Viry ([phone]) helps manage patient's care.      "       6/3/21    Patient is no longer staying with family. Patient is currently staying at the [facility] and working on getting into their program for more supportive housing.      Social Determinants of Health     Financial Resource Strain: Low Risk     Difficulty of Paying Living Expenses: Not hard at all   Food Insecurity: Unknown    Worried About Running Out of Food in the Last Year: Patient refused    Ran Out of Food in the Last Year: Patient refused   Transportation Needs: No Transportation Needs    Lack of Transportation (Medical): No    Lack of Transportation (Non-Medical): No   Physical Activity: Unknown    Days of Exercise per Week: Patient refused    Minutes of Exercise per Session: Patient refused   Stress: Stress Concern Present    Feeling of Stress : Rather much   Social Connections: Socially Isolated    Frequency of Communication with Friends and Family: More than three times a week    Frequency of Social Gatherings with Friends and Family: Twice a week    Attends Christian Services: Never    Active Member of Clubs or Organizations: No    Attends Club or Organization Meetings: Patient refused    Marital Status: Never    Housing Stability: Low Risk     Unable to Pay for Housing in the Last Year: No    Number of Places Lived in the Last Year: 2    Unstable Housing in the Last Year: No       Review of Systems   Constitutional:  Positive for malaise/fatigue and weight loss.   Respiratory:  Positive for cough and shortness of breath.        Objective:   BP (!) 122/58 (BP Location: Right arm, Patient Position: Sitting, BP Method: Large (Automatic))   Pulse 86   Temp 98.6 °F (37 °C) (Oral)   Ht 5' 8" (1.727 m)   Wt 61.6 kg (135 lb 12.9 oz)   SpO2 (!) 93%   BMI 20.65 kg/m²     Physical Exam  Constitutional:       Appearance: He is ill-appearing.      Comments: Frail appearing   Pulmonary:      Breath sounds: Wheezing present.   Neurological:      Motor: Weakness present.      Gait: Gait " abnormal.     Lab Results   Component Value Date    WBC 10.56 12/09/2022    HGB 12.1 (L) 12/09/2022    HCT 40.4 12/09/2022     12/09/2022    CHOL 260 (H) 04/19/2020    TRIG 145 04/19/2020    HDL 91 (H) 04/19/2020    ALT 17 12/09/2022    AST 14 12/09/2022     12/09/2022    K 4.5 12/09/2022     12/09/2022    CREATININE 0.8 12/09/2022    BUN 20 12/09/2022    CO2 30 (H) 12/09/2022    TSH 1.137 12/05/2022    INR 1.1 12/08/2022    HGBA1C 5.7 (H) 02/10/2022       Current Outpatient Medications on File Prior to Visit   Medication Sig Dispense Refill    albuterol (PROVENTIL/VENTOLIN HFA) 90 mcg/actuation inhaler Inhale 1-2 puffs into the lungs every 6 (six) hours as needed for Wheezing. Rescue 8.5 g 2    albuterol-ipratropium (DUO-NEB) 2.5 mg-0.5 mg/3 mL nebulizer solution Use 1 vial (3 mLs) by nebulization every 4 (four) hours as needed for Wheezing or Shortness of Breath. Rescue 180 mL 2    apixaban (ELIQUIS) 5 mg Tab Take 1 tablet (5 mg total) by mouth 2 (two) times daily. 180 tablet 3    atorvastatin (LIPITOR) 40 MG tablet Take 1 tablet (40 mg total) by mouth once daily. 90 tablet 3    bismuth subsalicylate (PEPTO BISMOL) 262 mg/15 mL suspension Take 30 mLs by mouth daily as needed for Indigestion.      budesonide-glycopyr-formoterol (BREZTRI AEROSPHERE) 160-9-4.8 mcg/actuation HFAA Inhale 2 puffs into the lungs 2 (two) times daily. 10.7 g 11    calcium carbonate (TUMS ORAL) Take 2 tablets by mouth daily as needed (Heartburn).      diltiaZEM (CARDIZEM) 90 MG tablet Take 2 tablets (180 mg total) by mouth once daily. 180 tablet 3    ferrous sulfate 325 (65 FE) MG EC tablet Take 1 tablet (325 mg total) by mouth once daily. 30 tablet 1    folic acid (FOLVITE) 1 MG tablet Take 1 tablet (1 mg total) by mouth once daily. 30 tablet 2    furosemide (LASIX) 20 MG tablet Take 1 tablet (20 mg total) by mouth once daily. 90 tablet 3    inhalat.spacing dev,large mask (COMPACT SPACE CHAMBER-LRG MASK) Spcr Use as  directed 1 each 0    multivit-min-FA-lycopen-lutein (CERTAVITE SENIOR) 0.4 mg-300 mcg- 250 mcg Tab Take 1 tablet by mouth once daily. 30 tablet 2    nicotine (NICODERM CQ) 7 mg/24 hr Place 1 patch onto the skin once daily. 30 patch 2    pantoprazole (PROTONIX) 40 MG tablet Take 1 tablet (40 mg total) by mouth once daily. 30 tablet 11    thiamine 100 MG tablet Take 1 tablet (100 mg total) by mouth once daily. 30 tablet 2    [DISCONTINUED] tiotropium bromide (SPIRIVA RESPIMAT) 2.5 mcg/actuation inhaler Inhale 2 puffs into the lungs Daily. Controller 4 g 5     No current facility-administered medications on file prior to visit.     Assessment:   62 y.o. male with multiple co-morbid illnesses here to follow-up with PCP and continue work-up of chronic issues    Plan:     Problem List Items Addressed This Visit          Pulmonary    Chronic obstructive pulmonary disease - Primary (Chronic)     Poor compliance with inhalers, may not have the dexterity or strength to use hand-held inhalers  Start brovana  Needs PA         Relevant Medications    arformoteroL (BROVANA) 15 mcg/2 mL Nebu    albuterol-ipratropium 2.5 mg-0.5 mg/3 mL nebulizer solution 3 mL (Completed)    Pulmonary cachexia due to chronic obstructive pulmonary disease     Hypoglycemia, hypoalbuminemia, low muscle mass, difficulty ambulating (slow gait speed), fatigue. BMI 19-20, weight variable due to collection method discrepancies. Numerous chronic conditions causing this multifactoral decline: COPD, h/o alcohol abuse, hypermetabolism with work of breath  Poor prognosis  High mortality associated with this phenomenan  Monitor weights and treat COPD  Strongly advised to stop smoking         Smokers' cough     Tobacco use, contemplative for quitting (is in the process of the quitting)  Advised to quit            Cardiac/Vascular    (HFpEF) heart failure with preserved ejection fraction     Low EF of 53%, on lasix chronically  monitor            Health Maintenance          Date Due Completion Date    TETANUS VACCINE Never done ---TODAY, BUT DID NOT WANT TO PAY $4    Colorectal Cancer Screening Never done ---    Shingles Vaccine (1 of 2) Never done ---    Hemoglobin A1c (Prediabetes) 02/10/2023 2/10/2022    High Dose Statin 12/21/2023 12/21/2022    Lipid Panel 04/19/2025 4/19/2020    Pneumococcal Vaccines (Age 0-64) (3 - PPSV23 if available, else PCV20) 12/19/2027 12/19/2022            Future Appointments   Date Time Provider Department Center   1/3/2023  8:00 AM Susan Rae, NP [address]   [facility]   1/31/2023  9:00 AM NURSE, [facility] Val[facility]   1/31/2023  9:30 AM [facility]cal [facility]         Follow up in about 1 week (around 1/4/2023). Total clinical care time was 20 min, issues addressed include COPD, tobacco abuse    MD Delphine Alvarez MD/MPH  [facility]  [phone] [email or web address]

## 2022-12-28 NOTE — ASSESSMENT & PLAN NOTE
Hypoglycemia, hypoalbuminemia, low muscle mass, difficulty ambulating (slow gait speed), fatigue. BMI 19-20, weight variable due to collection method discrepancies. Numerous chronic conditions causing this multifactoral decline: COPD, h/o alcohol abuse, hypermetabolism with work of breath  · Poor prognosis  · High mortality associated with this phenomenan  · Monitor weights and treat COPD  · Strongly advised to stop smoking

## 2022-12-30 ENCOUNTER — TELEPHONE (OUTPATIENT)
Dept: PHARMACY | Facility: CLINIC | Age: 62
End: 2022-12-30
Payer: MEDICARE

## 2022-12-30 NOTE — TELEPHONE ENCOUNTER
DOCUMENTATION ONLY  Arformoterol Tartrate 15mcg/2mL nebulizer solution    [employer]  This request was denied under your Medicare Part D benefit; however, coverage for the requested drug(s) has been approved under Medicare Part B. [place] follows Medicare rules. The Medicare rule in Chapter 6 of the Prescription Drug Manual says that drugs covered under the Part B benefit cannot be covered under Part D. Your pharmacy tells us where you live when they submit pharmacy claims. Your pharmacy has indicated you are getting this medication at home. The Medicare Benefit Manual (Chapter 15, Section 110.3) says Medicare Part B pays for drugs that require administration by the use of a piece of covered durable medical equipment (DME) such as a nebulizer. [facility] has approved coverage for your drug under your Part B benefit through 12/31/2023. If you think Medicare Part D should cover this drug for you, you may appeal.

## 2023-01-03 ENCOUNTER — TELEPHONE (OUTPATIENT)
Dept: PRIMARY CARE CLINIC | Facility: CLINIC | Age: 63
End: 2023-01-03
Payer: MEDICARE

## 2023-01-03 ENCOUNTER — CARE AT HOME (OUTPATIENT)
Dept: HOME HEALTH SERVICES | Facility: CLINIC | Age: 63
End: 2023-01-03
Payer: MEDICARE

## 2023-01-03 VITALS
BODY MASS INDEX: 20.46 KG/M2 | TEMPERATURE: 97 F | DIASTOLIC BLOOD PRESSURE: 64 MMHG | WEIGHT: 135 LBS | OXYGEN SATURATION: 92 % | RESPIRATION RATE: 18 BRPM | HEART RATE: 80 BPM | SYSTOLIC BLOOD PRESSURE: 126 MMHG | HEIGHT: 68 IN

## 2023-01-03 DIAGNOSIS — J42 CHRONIC BRONCHITIS, UNSPECIFIED CHRONIC BRONCHITIS TYPE: ICD-10-CM

## 2023-01-03 PROCEDURE — 1111F DSCHRG MED/CURRENT MED MERGE: CPT | Mod: CPTII,S$GLB,, | Performed by: NURSE PRACTITIONER

## 2023-01-03 PROCEDURE — 99349 PR HOME VISIT,ESTAB PATIENT,LEVEL III: ICD-10-PCS | Mod: S$GLB,,, | Performed by: NURSE PRACTITIONER

## 2023-01-03 PROCEDURE — 1111F PR DISCHARGE MEDS RECONCILED W/ CURRENT OUTPATIENT MED LIST: ICD-10-PCS | Mod: CPTII,S$GLB,, | Performed by: NURSE PRACTITIONER

## 2023-01-03 PROCEDURE — 99349 HOME/RES VST EST MOD MDM 40: CPT | Mod: S$GLB,,, | Performed by: NURSE PRACTITIONER

## 2023-01-03 NOTE — TELEPHONE ENCOUNTER
Pt called to cancel appt for tomorrow 1/4/23, appt rescheduled for Wed 1/11/23 at 1030am per his request.

## 2023-01-03 NOTE — PATIENT INSTRUCTIONS
Instructions:  - [facility] Nurse Practitioner to schedule home follow-up visit with patient in 6-8 weeks or as needed.  - Continue all medications, treatments and therapies as ordered.   - Follow all instructions, recommendations as discussed.  - Maintain Safety Precautions at all times.  - Attend all medical appointments as scheduled.  - For worsening symptoms: call Primary Care Physician or Nurse Practitioner.  - For emergencies, call 911 or immediately report to the nearest emergency room.  - Limit Risks of environmental exposure to coronavirus/COVID-19 as discussed including: social distancing, hand hygiene, and limiting departures from the home for necessities only.

## 2023-01-03 NOTE — PROGRESS NOTES
Adherence packed using Dispill for 28 days:  (Compressed monthly)     Morning Pocket:  Diltiazam 180 mg (2 tablets)   Eliquis 5 mg   Furosemide 20 mg  Ferrous Sulfate 325mg   Pantoprazole 40 mg       Evening Pocket:  Atorvastatin 40 mg    Eliquis 5 mg    Folic Acid 1mg   MVI 50+   Thiamine 100 mg

## 2023-01-03 NOTE — PROGRESS NOTES
[facility]  Medical Care Home Visit    Visit Date: 1/3/23  Encounter Provider: Susan Rae   PCP:  Dr. Delphine Orona    PRESENTING HISTORY      Patient ID: Baltazar Mccray is a 62 y.o. male.    Consult Requested By:    Reason for Consult:  Hospital Follow Up.    Baltazar is being seen at home due to being seen at home due to physical debility that presents a taxing effort to leave the home, to mitigate high risk of hospital readmission and/or due to the limited availability of reliable or safe options for transportation to the point of access to the provider. Prior to treatment on this visit the chart was reviewed and patient verbal consent was obtained.      Chief Complaint: Follow-up      History of Present Illness: Mr. Baltazar Mccray is a 62 y.o. male who was recently admitted to the hospital for COPD excerebration.     Admission Date: 8/10/2022  Hospital Length of Stay: 0 days  Discharge Date and Time: 8/12/2022      HPI:     Hospital Course:   Mr. Mccray was admitted to hospital medicine for acute on chronic respiratory failure in setting of COPD exacerbation. Initial pH 7.299 w/ pCO2 76.8 consistent with hypercapnic respiratory failure, placed on BIPAP and repeat VBG improved, weaned to home oxygen. Started on scheduled breathing treatments and prednisone. Pulmonology consulted for recurrent COPD exacerbations, patient is using LABA more than recommended dosage [facility] be leading to [facility]ospasm. Patient educated on proper use. Recommend azithromycin 500 mg MWF and follow up in clinic. Patient with elevated BNP of 926 (prior normal) and repeat echo with EF 55%, indeterminate diastolic function, abnormal septal wall motion, intermediate CVP (8 mmHg) and known interatrial septal aneurysm. Home lasix increased to 40 mg and patient given one time dose of IV lasix with significant UOP and improvement in SOB. Patient discharged on prednisone to complete 5 day course, azithromycin MWF, and lasix 40  mg daily. Educated on smoking cessation, nicotine patches ordered and and referral placed. BMP in 1 week. Referral to COPD clinic placed. PCP follow up in 1 week.   ____________________________________________  Today:Mr. Mccray is being evaluated at home for a follow up medical care visit ; last hospitalization, see above.     With this visit today patient is found at home alone, ambulatory with no AD when he opened the door. He was then sitting up on sofa with his continuous oxygen in progress. Patient is AAOx3, able to verify his name and . He was seeing [facility] as his PCP, however, I recently referred him to [facility] where he is no established with Dr. Orona. I will continue seeing the patient in the home as it is difficult for him to physically make visits. He endorses eating x 3 small meals per day with Premiere protein as supplemental drinks for meal replacements. Pt's sister helps him with household chores and his medications. She is not present today during visit but I spoke with her on the phone as she manages his medications.  He does report SOB with activity, he was instructed to schedule activity in intervals and take frequent rest periods, he verbalized understanding. I reviewed upcoming appointments and patient is aware that transportation is set up as well.    Oxygen precautions reinforced. Education completed ~ 15 minutes. Plan to follow up.     VSS. Denies fever, chest pain,  nausea, vomiting, diarrhea. Risks of environmental exposure to coronavirus discussed including: social distancing, hand hygiene, and limiting departures from the home for necessities only.  Reports understanding and willingness to comply.  All hospital discharge orders reviewed and being followed, all medications reconciled and reviewed, patient and family verbalized understanding. No other needs identified at this time.     Attestation: Screening criteria to assess the level of the patient's risk  for infection with COVID-19 as recommended by the CDC at the time of the above documented home visit concluded appropriateness to proceed. Universal precautions were maintained at all times, including provider use of 60% alcohol gel hand  immediately prior to entry and upon departing the patient's home.      Review of Systems   Constitutional:  Negative for fatigue and unexpected weight change.   HENT:  Negative for congestion.    Eyes:  Negative for redness and visual disturbance.   Respiratory:  Positive for shortness of breath. Negative for cough.    Cardiovascular:  Negative for chest pain and palpitations.   Gastrointestinal:  Negative for abdominal distention, nausea and vomiting.   Endocrine: Negative for polydipsia and polyuria.   Genitourinary:  Negative for difficulty urinating.   Musculoskeletal:  Negative for arthralgias and gait problem.   Skin:  Negative for color change.   Allergic/Immunologic: Negative for food allergies.   Neurological:  Negative for dizziness and weakness.   Psychiatric/Behavioral:  Negative for agitation.      Assessments:  Environmental: Lives in apartments, 1st floor  Functional Status: Independent  Safety: Fall precautions  Nutritional: n/a  Home Health/DME/Supplies: n/a    PAST HISTORY:     Past Medical History:   Diagnosis Date    Aspiration pneumonia 5/30/2021    Asthma     Atrial fibrillation with rapid ventricular response     CHF (congestive heart failure)     COPD (chronic obstructive pulmonary disease)     home O2 at night only    Coronary artery disease     Hypertension     Lung nodule     Pneumonia     Seizures        Past Surgical History:   Procedure Laterality Date    ESOPHAGOGASTRODUODENOSCOPY N/A 2/11/2022    Procedure: EGD (ESOPHAGOGASTRODUODENOSCOPY);  Surgeon: Cain Ortega MD;  Location: [address]);  Service: Endoscopy;  Laterality: N/A;    ESOPHAGOGASTRODUODENOSCOPY N/A 9/15/2022    Procedure: EGD (ESOPHAGOGASTRODUODENOSCOPY);  Surgeon:  Leonid Chavez MD;  Location: [facility] ENDO (2ND FLR);  Service: Endoscopy;  Laterality: N/A;  PA-50 - 2nd floor  vacc-wears 2L o2-inst mail and verbal-clears 4 hrs prior-tb  8/30 pt rescheduled; updated instructions mailed-st    LEFT HEART CATHETERIZATION  4/23/2020    Procedure: Left heart cath;  Surgeon: Nic Pollack MD;  Location: [facility] CATH LAB;  Service: Cardiology;;       Family History   Problem Relation Age of Onset    Cancer Father     Hypertension Sister     Stroke Sister     Stroke Brother     Diabetes Neg Hx     Heart disease Neg Hx        Social History     Socioeconomic History    Marital status: Single   Tobacco Use    Smoking status: Some Days     Packs/day: 0.25     Types: Cigarettes    Smokeless tobacco: Never    Tobacco comments:     1-2 cigarettes per day   Substance and Sexual Activity    Alcohol use: Yes     Alcohol/week: 2.0 standard drinks     Types: 2 Cans of beer per week     Comment: every night    Drug use: No    Sexual activity: Not Currently   Social History Narrative    Patient' nephew is currently living with him in his apartment. Patient's sister Viry ([phone]) helps manage patient's care.            6/3/21    Patient is no longer staying with family. Patient is currently staying at the [facility] and working on getting into their program for more supportive housing.      Social Determinants of Health     Financial Resource Strain: Low Risk     Difficulty of Paying Living Expenses: Not hard at all   Food Insecurity: Unknown    Worried About Running Out of Food in the Last Year: Patient refused    Ran Out of Food in the Last Year: Patient refused   Transportation Needs: No Transportation Needs    Lack of Transportation (Medical): No    Lack of Transportation (Non-Medical): No   Physical Activity: Unknown    Days of Exercise per Week: Patient refused    Minutes of Exercise per Session: Patient refused   Stress: Stress Concern Present    Feeling of Stress : Rather much    Social Connections: Socially Isolated    Frequency of Communication with Friends and Family: More than three times a week    Frequency of Social Gatherings with Friends and Family: Twice a week    Attends Rastafari Services: Never    Active Member of Clubs or Organizations: No    Attends Club or Organization Meetings: Patient refused    Marital Status: Never    Housing Stability: Low Risk     Unable to Pay for Housing in the Last Year: No    Number of Places Lived in the Last Year: 2    Unstable Housing in the Last Year: No       MEDICATIONS & ALLERGIES:     Current Outpatient Medications on File Prior to Visit   Medication Sig Dispense Refill    albuterol (PROVENTIL/VENTOLIN HFA) 90 mcg/actuation inhaler Inhale 1-2 puffs into the lungs every 6 (six) hours as needed for Wheezing. Rescue 8.5 g 2    albuterol-ipratropium (DUO-NEB) 2.5 mg-0.5 mg/3 mL nebulizer solution Use 1 vial (3 mLs) by nebulization every 4 (four) hours as needed for Wheezing or Shortness of Breath. Rescue 180 mL 2    apixaban (ELIQUIS) 5 mg Tab Take 1 tablet (5 mg total) by mouth 2 (two) times daily. 180 tablet 3    arformoteroL (BROVANA) 15 mcg/2 mL Nebu Take 2 mLs (15 mcg total) by nebulization 2 (two) times daily. Controller 360 mL 3    atorvastatin (LIPITOR) 40 MG tablet Take 1 tablet (40 mg total) by mouth once daily. 90 tablet 3    bismuth subsalicylate (PEPTO BISMOL) 262 mg/15 mL suspension Take 30 mLs by mouth daily as needed for Indigestion.      budesonide-glycopyr-formoterol (BREZTRI AEROSPHERE) 160-9-4.8 mcg/actuation HFAA Inhale 2 puffs into the lungs 2 (two) times daily. 10.7 g 11    calcium carbonate (TUMS ORAL) Take 2 tablets by mouth daily as needed (Heartburn).      diltiaZEM (CARDIZEM) 90 MG tablet Take 2 tablets (180 mg total) by mouth once daily. 180 tablet 3    ferrous sulfate 325 (65 FE) MG EC tablet Take 1 tablet (325 mg total) by mouth once daily. 30 tablet 1    folic acid (FOLVITE) 1 MG tablet Take 1 tablet (1 mg  total) by mouth once daily. 30 tablet 2    furosemide (LASIX) 20 MG tablet Take 1 tablet (20 mg total) by mouth once daily. 90 tablet 3    inhalat.spacing dev,large mask (COMPACT SPACE CHAMBER-LRG MASK) Spcr Use as directed 1 each 0    multivit-min-FA-lycopen-lutein (CERTAVITE SENIOR) 0.4 mg-300 mcg- 250 mcg Tab Take 1 tablet by mouth once daily. 30 tablet 2    nicotine (NICODERM CQ) 7 mg/24 hr Place 1 patch onto the skin once daily. 30 patch 2    pantoprazole (PROTONIX) 40 MG tablet Take 1 tablet (40 mg total) by mouth once daily. 30 tablet 11    thiamine 100 MG tablet Take 1 tablet (100 mg total) by mouth once daily. 30 tablet 2    [DISCONTINUED] tiotropium bromide (SPIRIVA RESPIMAT) 2.5 mcg/actuation inhaler Inhale 2 puffs into the lungs Daily. Controller 4 g 5     No current facility-administered medications on file prior to visit.        Review of patient's allergies indicates:   Allergen Reactions    Benazepril Swelling       OBJECTIVE:     Vital Signs:  Vitals:    01/03/23 1047   BP: 126/64   Pulse: 80   Resp: 18   Temp: 97.1 °F (36.2 °C)     Body mass index is 20.53 kg/m².     Physical Exam:  Physical Exam  HENT:      Head: Atraumatic.      Nose: Nose normal.   Cardiovascular:      Rate and Rhythm: Normal rate.   Pulmonary:      Effort: Pulmonary effort is normal.      Comments: Continuous supplemental oxygen in use 2L/NC  Abdominal:      General: Abdomen is flat.   Musculoskeletal:         General: Normal range of motion.   Skin:     General: Skin is warm and dry.      Capillary Refill: Capillary refill takes less than 2 seconds.   Neurological:      Mental Status: He is alert and oriented to person, place, and time.   Psychiatric:         Mood and Affect: Mood normal.       Laboratory  Lab Results   Component Value Date    WBC 10.56 12/09/2022    HGB 12.1 (L) 12/09/2022    HCT 40.4 12/09/2022    MCV 83 12/09/2022     12/09/2022     Lab Results   Component Value Date    INR 1.1 12/08/2022    INR 1.0  12/06/2022    INR 1.0 03/14/2022     Lab Results   Component Value Date    HGBA1C 5.7 (H) 02/10/2022     No results for input(s): POCTGLUCOSE in the last 72 hours.    Diagnostic Results:  N/A      Medications Reconciliation:   I have reconciled the patient's home medications and discharge medications with the patient/family. I have updated all changes.  Refer to After-Visit Medication List.    ASSESSMENT & PLAN:     Tobacco Abuse  Pt still smoking and using home 02.   Counseling given for smoking cessation and no smoking around oxygen  Pt using Nicotine patch daily    COPD  The current medical regimen is effective;  continue present plan and medications.     Pt taking nebulizer's, inhalers and using home 02  Pt still smoking 1-2 cigarettes per day.   Counseling given for smoking cessation.       Were controlled substances prescribed?  No      Patient Instructions Given:  - Continue all medications, treatments and therapies as ordered.   - Follow all instructions, recommendations as discussed.  - Maintain Safety Precautions at all times.  - Attend all medical appointments as scheduled.  - For worsening symptoms: call Primary Care Physician or Nurse Practitioner.  - For emergencies, call 911 or immediately report to the nearest emergency room.    After Visit Medication List :     Medication List            Accurate as of January 3, 2023 10:49 AM. If you have any questions, ask your nurse or doctor.                CONTINUE taking these medications      albuterol 90 mcg/actuation inhaler  Commonly known as: PROVENTIL/VENTOLIN HFA  Inhale 1-2 puffs into the lungs every 6 (six) hours as needed for Wheezing. Rescue     albuterol-ipratropium 2.5 mg-0.5 mg/3 mL nebulizer solution  Commonly known as: DUO-NEB  Use 1 vial (3 mLs) by nebulization every 4 (four) hours as needed for Wheezing or Shortness of Breath. Rescue     apixaban 5 mg Tab  Commonly known as: ELIQUIS  Take 1 tablet (5 mg total) by mouth 2 (two) times daily.      arformoteroL 15 mcg/2 mL Nebu  Commonly known as: BROVANA  Take 2 mLs (15 mcg total) by nebulization 2 (two) times daily. Controller     atorvastatin 40 MG tablet  Commonly known as: LIPITOR  Take 1 tablet (40 mg total) by mouth once daily.     bismuth subsalicylate 262 mg/15 mL suspension  Commonly known as: PEPTO BISMOL     BREZTRI AEROSPHERE 160-9-4.8 mcg/actuation Hfaa  Generic drug: budesonide-glycopyr-formoterol  Inhale 2 puffs into the lungs 2 (two) times daily.     CERTAVITE SENIOR 0.4 mg-300 mcg- 250 mcg Tab  Generic drug: multivit-min-FA-lycopen-lutein  Take 1 tablet by mouth once daily.     COMPACT SPACE CHAMBER-LRG MASK Spcr  Generic drug: inhalat.spacing dev,large mask  Use as directed     diltiaZEM 90 MG tablet  Commonly known as: CARDIZEM  Take 2 tablets (180 mg total) by mouth once daily.     ferrous sulfate 325 (65 FE) MG EC tablet  Take 1 tablet (325 mg total) by mouth once daily.     folic acid 1 MG tablet  Commonly known as: FOLVITE  Take 1 tablet (1 mg total) by mouth once daily.     furosemide 20 MG tablet  Commonly known as: LASIX  Take 1 tablet (20 mg total) by mouth once daily.     nicotine 7 mg/24 hr  Commonly known as: NICODERM CQ  Place 1 patch onto the skin once daily.     pantoprazole 40 MG tablet  Commonly known as: PROTONIX  Take 1 tablet (40 mg total) by mouth once daily.     thiamine 100 MG tablet  Take 1 tablet (100 mg total) by mouth once daily.     [facility] ORAL            Future Appointments   Date Time Provider Department Center   1/4/2023 10:30 AM NURSE, [facility] MED CL[facility]EN Valdovinos PCW   1/31/2023  9:00 AM NURSE, [facility] MED CLDEVEN CABALLEROW   1/31/2023  9:30 AM Carlo Pascal MD [facility]     Risks of environmental exposure to coronavirus discussed including: social distancing, hand hygiene, and limiting departures from the home for necessities only. Reports understanding and willingness to comply.      Signature:    Susan Rae MSN, FNP-C  [facility]

## 2023-01-04 ENCOUNTER — TELEPHONE (OUTPATIENT)
Dept: PRIMARY CARE CLINIC | Facility: CLINIC | Age: 63
End: 2023-01-04

## 2023-01-04 NOTE — TELEPHONE ENCOUNTER
Please change patient to a phone call appt. He is high-risk for hospital admission, and needs to have consistent appts even if he is out of town.    Thanks,  KJ

## 2023-01-05 ENCOUNTER — TELEPHONE (OUTPATIENT)
Dept: PHARMACY | Facility: CLINIC | Age: 63
End: 2023-01-05
Payer: MEDICARE

## 2023-01-06 ENCOUNTER — DOCUMENTATION ONLY (OUTPATIENT)
Dept: CASE MANAGEMENT | Facility: HOSPITAL | Age: 63
End: 2023-01-06
Payer: MEDICARE

## 2023-01-06 ENCOUNTER — DOCUMENTATION ONLY (OUTPATIENT)
Dept: PHARMACY | Facility: CLINIC | Age: 63
End: 2023-01-06
Payer: MEDICARE

## 2023-01-06 NOTE — PROGRESS NOTES
Patient Assistant Program Application forms for both Perforomist and Pulmicort initiated. Prescriber portion of application complete. Awaiting patient portion of the application and necessary income documentation.    LVM for patient to call back. We need to explain that he will need to come in to complete the forms.    We also need information on his annual gross income and his household size.

## 2023-01-08 ENCOUNTER — HOSPITAL ENCOUNTER (INPATIENT)
Facility: HOSPITAL | Age: 63
LOS: 1 days | Discharge: HOME | DRG: 190 | End: 2023-01-10
Attending: EMERGENCY MEDICINE | Admitting: HOSPITALIST
Payer: MEDICARE

## 2023-01-08 DIAGNOSIS — J44.1 COPD EXACERBATION: Primary | ICD-10-CM

## 2023-01-08 DIAGNOSIS — J44.9 CHRONIC OBSTRUCTIVE PULMONARY DISEASE, UNSPECIFIED COPD TYPE: ICD-10-CM

## 2023-01-08 DIAGNOSIS — J96.11 HYPOXEMIC RESPIRATORY FAILURE, CHRONIC: ICD-10-CM

## 2023-01-08 DIAGNOSIS — Z86.79 HISTORY OF CHF (CONGESTIVE HEART FAILURE): ICD-10-CM

## 2023-01-08 DIAGNOSIS — R06.02 SOB (SHORTNESS OF BREATH): ICD-10-CM

## 2023-01-08 DIAGNOSIS — R07.9 CHEST PAIN: ICD-10-CM

## 2023-01-08 PROBLEM — E83.42 HYPOMAGNESEMIA: Status: ACTIVE | Noted: 2023-01-08

## 2023-01-08 LAB
ALBUMIN SERPL BCP-MCNC: 3.2 G/DL (ref 3.5–5.2)
ALLENS TEST: ABNORMAL
ALP SERPL-CCNC: 100 U/L (ref 55–135)
ALT SERPL W/O P-5'-P-CCNC: 14 U/L (ref 10–44)
ANION GAP SERPL CALC-SCNC: 13 MMOL/L (ref 8–16)
AST SERPL-CCNC: 20 U/L (ref 10–40)
BASOPHILS # BLD AUTO: 0.06 K/UL (ref 0–0.2)
BASOPHILS NFR BLD: 0.6 % (ref 0–1.9)
BILIRUB SERPL-MCNC: 0.4 MG/DL (ref 0.1–1)
BNP SERPL-MCNC: 173 PG/ML (ref 0–99)
BUN SERPL-MCNC: 5 MG/DL (ref 6–20)
CALCIUM SERPL-MCNC: 8.9 MG/DL (ref 8.7–10.5)
CHLORIDE SERPL-SCNC: 92 MMOL/L (ref 95–110)
CO2 SERPL-SCNC: 37 MMOL/L (ref 23–29)
CREAT SERPL-MCNC: 0.9 MG/DL (ref 0.5–1.4)
DELSYS: ABNORMAL
DIFFERENTIAL METHOD: ABNORMAL
EOSINOPHIL # BLD AUTO: 0.2 K/UL (ref 0–0.5)
EOSINOPHIL NFR BLD: 1.6 % (ref 0–8)
ERYTHROCYTE [DISTWIDTH] IN BLOOD BY AUTOMATED COUNT: 19 % (ref 11.5–14.5)
EST. GFR  (NO RACE VARIABLE): >60 ML/MIN/1.73 M^2
GLUCOSE SERPL-MCNC: 90 MG/DL (ref 70–110)
HCO3 UR-SCNC: 43.8 MMOL/L (ref 24–28)
HCT VFR BLD AUTO: 39.7 % (ref 40–54)
HGB BLD-MCNC: 11.8 G/DL (ref 14–18)
IMM GRANULOCYTES # BLD AUTO: 0.03 K/UL (ref 0–0.04)
IMM GRANULOCYTES NFR BLD AUTO: 0.3 % (ref 0–0.5)
LYMPHOCYTES # BLD AUTO: 2.4 K/UL (ref 1–4.8)
LYMPHOCYTES NFR BLD: 24.4 % (ref 18–48)
MAGNESIUM SERPL-MCNC: 1.5 MG/DL (ref 1.6–2.6)
MCH RBC QN AUTO: 24.7 PG (ref 27–31)
MCHC RBC AUTO-ENTMCNC: 29.7 G/DL (ref 32–36)
MCV RBC AUTO: 83 FL (ref 82–98)
MONOCYTES # BLD AUTO: 0.9 K/UL (ref 0.3–1)
MONOCYTES NFR BLD: 9.1 % (ref 4–15)
NEUTROPHILS # BLD AUTO: 6.2 K/UL (ref 1.8–7.7)
NEUTROPHILS NFR BLD: 64 % (ref 38–73)
NRBC BLD-RTO: 0 /100 WBC
PCO2 BLDA: 60 MMHG (ref 35–45)
PH SMN: 7.47 [PH] (ref 7.35–7.45)
PLATELET # BLD AUTO: 386 K/UL (ref 150–450)
PMV BLD AUTO: 8.9 FL (ref 9.2–12.9)
PO2 BLDA: 76 MMHG (ref 80–100)
POC BE: 20 MMOL/L
POC SATURATED O2: 95 % (ref 95–100)
POC TCO2: 46 MMOL/L (ref 23–27)
POCT GLUCOSE: 190 MG/DL (ref 70–110)
POTASSIUM SERPL-SCNC: 3.4 MMOL/L (ref 3.5–5.1)
PROT SERPL-MCNC: 7.5 G/DL (ref 6–8.4)
RBC # BLD AUTO: 4.77 M/UL (ref 4.6–6.2)
SAMPLE: ABNORMAL
SITE: ABNORMAL
SODIUM SERPL-SCNC: 142 MMOL/L (ref 136–145)
TROPONIN I SERPL DL<=0.01 NG/ML-MCNC: 0.06 NG/ML (ref 0–0.03)
TROPONIN I SERPL DL<=0.01 NG/ML-MCNC: 0.07 NG/ML (ref 0–0.03)
WBC # BLD AUTO: 9.72 K/UL (ref 3.9–12.7)

## 2023-01-08 PROCEDURE — 36600 WITHDRAWAL OF ARTERIAL BLOOD: CPT

## 2023-01-08 PROCEDURE — 94640 AIRWAY INHALATION TREATMENT: CPT

## 2023-01-08 PROCEDURE — G0378 HOSPITAL OBSERVATION PER HR: HCPCS

## 2023-01-08 PROCEDURE — 99223 1ST HOSP IP/OBS HIGH 75: CPT | Mod: ,,,

## 2023-01-08 PROCEDURE — 99223 PR INITIAL HOSPITAL CARE,LEVL III: ICD-10-PCS | Mod: ,,,

## 2023-01-08 PROCEDURE — 80053 COMPREHEN METABOLIC PANEL: CPT | Performed by: EMERGENCY MEDICINE

## 2023-01-08 PROCEDURE — 25000003 PHARM REV CODE 250: Performed by: EMERGENCY MEDICINE

## 2023-01-08 PROCEDURE — 93010 ELECTROCARDIOGRAM REPORT: CPT | Mod: ,,, | Performed by: INTERNAL MEDICINE

## 2023-01-08 PROCEDURE — 84484 ASSAY OF TROPONIN QUANT: CPT | Mod: 91 | Performed by: PHYSICIAN ASSISTANT

## 2023-01-08 PROCEDURE — 27000190 HC CPAP FULL FACE MASK W/VALVE

## 2023-01-08 PROCEDURE — 27000221 HC OXYGEN, UP TO 24 HOURS

## 2023-01-08 PROCEDURE — 25000003 PHARM REV CODE 250

## 2023-01-08 PROCEDURE — 96375 TX/PRO/DX INJ NEW DRUG ADDON: CPT

## 2023-01-08 PROCEDURE — 83735 ASSAY OF MAGNESIUM: CPT | Performed by: EMERGENCY MEDICINE

## 2023-01-08 PROCEDURE — 99900031 HC PATIENT EDUCATION (STAT)

## 2023-01-08 PROCEDURE — 82962 GLUCOSE BLOOD TEST: CPT

## 2023-01-08 PROCEDURE — 82803 BLOOD GASES ANY COMBINATION: CPT

## 2023-01-08 PROCEDURE — 63600175 PHARM REV CODE 636 W HCPCS: Performed by: EMERGENCY MEDICINE

## 2023-01-08 PROCEDURE — 99291 CRITICAL CARE FIRST HOUR: CPT | Mod: ,,, | Performed by: EMERGENCY MEDICINE

## 2023-01-08 PROCEDURE — 25000242 PHARM REV CODE 250 ALT 637 W/ HCPCS: Performed by: EMERGENCY MEDICINE

## 2023-01-08 PROCEDURE — 85025 COMPLETE CBC W/AUTO DIFF WBC: CPT | Performed by: EMERGENCY MEDICINE

## 2023-01-08 PROCEDURE — 93005 ELECTROCARDIOGRAM TRACING: CPT

## 2023-01-08 PROCEDURE — 94761 N-INVAS EAR/PLS OXIMETRY MLT: CPT

## 2023-01-08 PROCEDURE — 84484 ASSAY OF TROPONIN QUANT: CPT | Performed by: EMERGENCY MEDICINE

## 2023-01-08 PROCEDURE — 25000003 PHARM REV CODE 250: Performed by: PHYSICIAN ASSISTANT

## 2023-01-08 PROCEDURE — 99291 PR CRITICAL CARE, E/M 30-74 MINUTES: ICD-10-PCS | Mod: ,,, | Performed by: EMERGENCY MEDICINE

## 2023-01-08 PROCEDURE — 25000242 PHARM REV CODE 250 ALT 637 W/ HCPCS: Performed by: PHYSICIAN ASSISTANT

## 2023-01-08 PROCEDURE — 99900035 HC TECH TIME PER 15 MIN (STAT)

## 2023-01-08 PROCEDURE — 96365 THER/PROPH/DIAG IV INF INIT: CPT

## 2023-01-08 PROCEDURE — 99291 CRITICAL CARE FIRST HOUR: CPT | Mod: 25

## 2023-01-08 PROCEDURE — 83880 ASSAY OF NATRIURETIC PEPTIDE: CPT | Performed by: EMERGENCY MEDICINE

## 2023-01-08 PROCEDURE — 93010 EKG 12-LEAD: ICD-10-PCS | Mod: ,,, | Performed by: INTERNAL MEDICINE

## 2023-01-08 PROCEDURE — 94660 CPAP INITIATION&MGMT: CPT

## 2023-01-08 RX ORDER — DILTIAZEM HYDROCHLORIDE 60 MG/1
180 TABLET, FILM COATED ORAL DAILY
Status: DISCONTINUED | OUTPATIENT
Start: 2023-01-09 | End: 2023-01-10 | Stop reason: HOSPADM

## 2023-01-08 RX ORDER — MAG HYDROX/ALUMINUM HYD/SIMETH 200-200-20
30 SUSPENSION, ORAL (FINAL DOSE FORM) ORAL 4 TIMES DAILY PRN
Status: DISCONTINUED | OUTPATIENT
Start: 2023-01-08 | End: 2023-01-10 | Stop reason: HOSPADM

## 2023-01-08 RX ORDER — IBUPROFEN 200 MG
16 TABLET ORAL
Status: DISCONTINUED | OUTPATIENT
Start: 2023-01-08 | End: 2023-01-10 | Stop reason: HOSPADM

## 2023-01-08 RX ORDER — GLUCAGON 1 MG
1 KIT INJECTION
Status: DISCONTINUED | OUTPATIENT
Start: 2023-01-08 | End: 2023-01-10 | Stop reason: HOSPADM

## 2023-01-08 RX ORDER — IPRATROPIUM BROMIDE AND ALBUTEROL SULFATE 2.5; .5 MG/3ML; MG/3ML
3 SOLUTION RESPIRATORY (INHALATION)
Status: COMPLETED | OUTPATIENT
Start: 2023-01-08 | End: 2023-01-08

## 2023-01-08 RX ORDER — IBUPROFEN 200 MG
24 TABLET ORAL
Status: DISCONTINUED | OUTPATIENT
Start: 2023-01-08 | End: 2023-01-10 | Stop reason: HOSPADM

## 2023-01-08 RX ORDER — ONDANSETRON 8 MG/1
8 TABLET, ORALLY DISINTEGRATING ORAL EVERY 8 HOURS PRN
Status: DISCONTINUED | OUTPATIENT
Start: 2023-01-08 | End: 2023-01-10 | Stop reason: HOSPADM

## 2023-01-08 RX ORDER — GUAIFENESIN 600 MG/1
600 TABLET, EXTENDED RELEASE ORAL 2 TIMES DAILY
Status: DISCONTINUED | OUTPATIENT
Start: 2023-01-08 | End: 2023-01-10 | Stop reason: HOSPADM

## 2023-01-08 RX ORDER — MAGNESIUM SULFATE HEPTAHYDRATE 40 MG/ML
2 INJECTION, SOLUTION INTRAVENOUS
Status: COMPLETED | OUTPATIENT
Start: 2023-01-08 | End: 2023-01-08

## 2023-01-08 RX ORDER — LANOLIN ALCOHOL/MO/W.PET/CERES
1 CREAM (GRAM) TOPICAL DAILY
Status: DISCONTINUED | OUTPATIENT
Start: 2023-01-09 | End: 2023-01-10 | Stop reason: HOSPADM

## 2023-01-08 RX ORDER — TALC
6 POWDER (GRAM) TOPICAL NIGHTLY PRN
Status: DISCONTINUED | OUTPATIENT
Start: 2023-01-08 | End: 2023-01-10 | Stop reason: HOSPADM

## 2023-01-08 RX ORDER — METHYLPREDNISOLONE SOD SUCC 125 MG
125 VIAL (EA) INJECTION
Status: COMPLETED | OUTPATIENT
Start: 2023-01-08 | End: 2023-01-08

## 2023-01-08 RX ORDER — POLYETHYLENE GLYCOL 3350 17 G/17G
17 POWDER, FOR SOLUTION ORAL 2 TIMES DAILY PRN
Status: DISCONTINUED | OUTPATIENT
Start: 2023-01-08 | End: 2023-01-10 | Stop reason: HOSPADM

## 2023-01-08 RX ORDER — ONDANSETRON 2 MG/ML
4 INJECTION INTRAMUSCULAR; INTRAVENOUS EVERY 12 HOURS PRN
Status: DISCONTINUED | OUTPATIENT
Start: 2023-01-08 | End: 2023-01-10 | Stop reason: HOSPADM

## 2023-01-08 RX ORDER — IPRATROPIUM BROMIDE AND ALBUTEROL SULFATE 2.5; .5 MG/3ML; MG/3ML
3 SOLUTION RESPIRATORY (INHALATION)
Status: DISCONTINUED | OUTPATIENT
Start: 2023-01-08 | End: 2023-01-09

## 2023-01-08 RX ORDER — NALOXONE HCL 0.4 MG/ML
0.02 VIAL (ML) INJECTION
Status: DISCONTINUED | OUTPATIENT
Start: 2023-01-08 | End: 2023-01-10 | Stop reason: HOSPADM

## 2023-01-08 RX ORDER — THIAMINE HCL 100 MG
100 TABLET ORAL DAILY
Status: DISCONTINUED | OUTPATIENT
Start: 2023-01-09 | End: 2023-01-10 | Stop reason: HOSPADM

## 2023-01-08 RX ORDER — FUROSEMIDE 20 MG/1
20 TABLET ORAL DAILY
Status: DISCONTINUED | OUTPATIENT
Start: 2023-01-09 | End: 2023-01-10 | Stop reason: HOSPADM

## 2023-01-08 RX ORDER — SODIUM CHLORIDE 0.9 % (FLUSH) 0.9 %
3 SYRINGE (ML) INJECTION
Status: DISCONTINUED | OUTPATIENT
Start: 2023-01-08 | End: 2023-01-10 | Stop reason: HOSPADM

## 2023-01-08 RX ORDER — POTASSIUM CHLORIDE 20 MEQ/1
40 TABLET, EXTENDED RELEASE ORAL EVERY 4 HOURS
Status: COMPLETED | OUTPATIENT
Start: 2023-01-08 | End: 2023-01-08

## 2023-01-08 RX ORDER — PANTOPRAZOLE SODIUM 40 MG/1
40 TABLET, DELAYED RELEASE ORAL DAILY
Status: DISCONTINUED | OUTPATIENT
Start: 2023-01-09 | End: 2023-01-10 | Stop reason: HOSPADM

## 2023-01-08 RX ORDER — PREDNISONE 20 MG/1
40 TABLET ORAL DAILY
Status: DISCONTINUED | OUTPATIENT
Start: 2023-01-09 | End: 2023-01-10 | Stop reason: HOSPADM

## 2023-01-08 RX ORDER — HYDRALAZINE HYDROCHLORIDE 25 MG/1
25 TABLET, FILM COATED ORAL EVERY 8 HOURS PRN
Status: DISCONTINUED | OUTPATIENT
Start: 2023-01-08 | End: 2023-01-10 | Stop reason: HOSPADM

## 2023-01-08 RX ORDER — SIMETHICONE 80 MG
1 TABLET,CHEWABLE ORAL 4 TIMES DAILY PRN
Status: DISCONTINUED | OUTPATIENT
Start: 2023-01-08 | End: 2023-01-10 | Stop reason: HOSPADM

## 2023-01-08 RX ORDER — FOLIC ACID 1 MG/1
1 TABLET ORAL DAILY
Status: DISCONTINUED | OUTPATIENT
Start: 2023-01-09 | End: 2023-01-10 | Stop reason: HOSPADM

## 2023-01-08 RX ORDER — ACETAMINOPHEN 325 MG/1
650 TABLET ORAL EVERY 8 HOURS PRN
Status: DISCONTINUED | OUTPATIENT
Start: 2023-01-08 | End: 2023-01-10 | Stop reason: HOSPADM

## 2023-01-08 RX ORDER — ATORVASTATIN CALCIUM 40 MG/1
40 TABLET, FILM COATED ORAL DAILY
Status: DISCONTINUED | OUTPATIENT
Start: 2023-01-09 | End: 2023-01-10 | Stop reason: HOSPADM

## 2023-01-08 RX ADMIN — IPRATROPIUM BROMIDE AND ALBUTEROL SULFATE 3 ML: .5; 3 SOLUTION RESPIRATORY (INHALATION) at 11:01

## 2023-01-08 RX ADMIN — IPRATROPIUM BROMIDE AND ALBUTEROL SULFATE 3 ML: 2.5; .5 SOLUTION RESPIRATORY (INHALATION) at 01:01

## 2023-01-08 RX ADMIN — METHYLPREDNISOLONE SODIUM SUCCINATE 125 MG: 125 INJECTION, POWDER, FOR SOLUTION INTRAMUSCULAR; INTRAVENOUS at 01:01

## 2023-01-08 RX ADMIN — IPRATROPIUM BROMIDE AND ALBUTEROL SULFATE 3 ML: 2.5; .5 SOLUTION RESPIRATORY (INHALATION) at 03:01

## 2023-01-08 RX ADMIN — APIXABAN 5 MG: 5 TABLET, FILM COATED ORAL at 10:01

## 2023-01-08 RX ADMIN — POTASSIUM CHLORIDE 40 MEQ: 1500 TABLET, EXTENDED RELEASE ORAL at 06:01

## 2023-01-08 RX ADMIN — MAGNESIUM SULFATE 2 G: 2 INJECTION INTRAVENOUS at 01:01

## 2023-01-08 RX ADMIN — POTASSIUM CHLORIDE 40 MEQ: 1500 TABLET, EXTENDED RELEASE ORAL at 10:01

## 2023-01-08 RX ADMIN — GUAIFENESIN 600 MG: 600 TABLET, EXTENDED RELEASE ORAL at 10:01

## 2023-01-08 NOTE — ED NOTES
LOC: Patient is awake, alert, and aware of environment with an appropriate affect. Patient is oriented x 4 and speaking appropriately.  APPEARANCE: Patient resting comfortably and in no acute distress. Patient is clean and well groomed, patient's clothing is properly fastened. Pt placed in a gown.  HEENT: Pt presents with surgical mask on.   SKIN: The skin is warm and dry. Patient has normal skin turgor and moist mucus membranes.   MUSKULOSKELETAL: Patient is moving all extremities well, no obvious deformities noted. Pulses intact.   RESPIRATORY: Airway is open and patent. Respirations are spontaneous but labored with increased effort and rate. Pt is wheezing, arrives via EMS via 10L non-rebreather mask. Pt reports the use of steroids at home, 2L supplemental O2 but states this is not adequate enough. Reports still continuing to smoke cigarretes.   CARDIAC: Patient has an elevated rate and rhythm. 102 on cardiac monitor. No peripheral edema noted.   ABDOMEN: No distention noted. Soft and non-tender upon palpation.  NEUROLOGICAL: Facial expression is symmetrical. Hand grasps are equal bilaterally. Normal sensation in all extremities when touched with finger.

## 2023-01-08 NOTE — PLAN OF CARE
"[facility] - Emergency Dept  Initial Discharge Assessment       Primary Care Provider: Delphine Orona MD    Admission Diagnosis: No admission diagnoses are documented for this encounter.    Admission Date: 1/8/2023  Expected Discharge Date:     SW met with pt I ED 12.  SW introduced herself as a part of the ED Team and asked what brought pt in today.  Pt stated that he was having trouble breathing and he was having difficulty "taking on air".      Pt stated he lives with his cat "mini mini mini" in an main floor apartment that is wheelchair accessible.      Pt is currently on disability and he is no longer working.      Pt will require Lyft ride when ready for d/c     Discharge Barriers Identified: (P) None    Payor: HUMANA MANAGED MEDICARE / Plan: HUMANA SNP (SPECIAL NEEDS PLAN) / Product Type: Medicare Advantage /     Extended Emergency Contact Information  Primary Emergency Contact: Gladis Mccray   [place]  Mobile Phone: [phone]  Relation: Sister  Secondary Emergency Contact: Viry Mccray   [place]  Mobile Phone: [phone]  Relation: Sister    Discharge Plan A: (P) Home  Discharge Plan B: (P) Home      [facility]  [address]  [place] [address]  Phone: [phone] Fax: [phone]    [id number] [address]  [facility] AT [facility] [address]  [address] [place] [address]  Phone: [phone] Fax: [phone]      Initial Assessment (most recent)       Adult Discharge Assessment - 01/08/23 1414          Discharge Assessment    Assessment Type Discharge Planning Assessment (P)      Confirmed/corrected address, phone number and insurance Yes (P)      Confirmed Demographics Correct on Facesheet (P)      Source of Information patient (P)      People in Home alone (P)      Facility Arrived From: home (P)      Do you expect to return to your current living situation? Yes (P)      " Do you have help at home or someone to help you manage your care at home? No (P)      Prior to hospitilization cognitive status: Alert/Oriented;No Deficits (P)      Current cognitive status: Alert/Oriented;No Deficits (P)      Home Accessibility wheelchair accessible (P)      Home Layout Able to live on 1st floor (P)      Equipment Currently Used at Home nebulizer;BIPAP (P)    concentrator    Do you currently have service(s) that help you manage your care at home? No (P)      Do you have any problems affording any of your prescribed medications? No (P)      Is the patient taking medications as prescribed? yes (P)      Are you on dialysis? No (P)      Do you take coumadin? No (P)      Discharge Plan A Home (P)      Discharge Plan B Home (P)      DME Needed Upon Discharge  none (P)      Discharge Plan discussed with: Patient (P)      Discharge Barriers Identified None (P)                       Giulia Castillo CD, MSW, LMSW, RSW   Case Management  [facility]  Email: [email or web address]

## 2023-01-08 NOTE — ASSESSMENT & PLAN NOTE
- Hgb 11.8, stable  - No s/s acute bleeding  - Transfuse hgb<7  - Trend on daily CBC  - Continue Folate, Iron supplementation qD

## 2023-01-08 NOTE — ASSESSMENT & PLAN NOTE
Acute on chronic respiratory failure with hypoxia and hypercapnia  Patient's COPD is with exacerbation noted by continued dyspnea and worsening of baseline hypoxia currently.  - Patient is currently on COPD Pathway.   - Continue scheduled inhalers Steroids and Supplemental oxygen and monitor respiratory status closely.  - Patient with Hypercapnic and Hypoxic Respiratory failure which is acute on chronic.   - He is on home oxygen at 2 LPM.  - Supplemental oxygen was provided and noted.  - Signs/symptoms of respiratory failure include tachypnea, increased work of breathing, respiratory distress and wheezing.  - Contributing diagnoses includes CHF and COPD  - Labs and images were reviewed. Patient has not had a recent ABG.   - Will treat underlying causes and adjust management of respiratory failure as follows  - 1/4 SIRS, HR>90  - SpO2 90% on 4 L NC  - HCO3 37, AG 13  - CXR shows mildly coarse interstitial attenuation, similar to the previous exam. A bandlike atelectasis projected over the right midlung zone. No large focal consolidation or pneumothorax.   - In ED, s/p Duonebs x3   - IV Solumedrol x1, start Prednisone 40 mg qD  - Duonebs q6h  - ABG reviewed  - BiPAP qhs  - Incentive spirometry  - Monitoring on tele  - Continue to monitor vitals

## 2023-01-08 NOTE — ED NOTES
Pt completed respiratory treatment, left side rail lowered at pt's request in order to use urinal, pt stated no assistance needed

## 2023-01-08 NOTE — ED NOTES
Pt assisted to side of bed to eat dinner tray, incentive spirometry performed, achieved 100 mL, pt educated to perform 10x/hr

## 2023-01-08 NOTE — H&P
[facility] - Emergency Dept  [facility] Medicine  History & Physical    Patient Name: Baltazar Mccray  MRN: [id number]  Patient Class: OP- Observation  Admission Date: 1/8/2023  Attending Physician: Hien Andujar MD   Primary Care Provider: Delphine Orona MD         Patient information was obtained from patient, past medical records and ER records.     Subjective:     Principal Problem:COPD exacerbation    Chief Complaint:   Chief Complaint   Patient presents with    Shortness of Breath     Pt complains of SOB, room air 80%. Pt normally on 2L nasal cannula, hx of COPD.         HPI: Mr. Mccray is a 62-year-old male with a history of atrial fibrillation (on Eliquis), CHF, seizures, CAD, HTN, and COPD (on 2 L O2 at home) presenting to the ED with worsening shortness of breath x 3 days. Per EMS, was found to have O2 sat of 80% on RA at home. Patient reports SOB is worse with exertion and is associated with lightheadedness and dizziness. He has been using his home breathing treatments and BiPAP with minimal improvement. Patient reports compliance with home medications. However, reports smoking 2 cigarettes 3 days ago. He denies any fevers, chills, chest pain, orthopnea, sick contacts, recent infections, congestion, n/v/d, abdominal pain, dysuria, leg pain or swelling. Of note, patient was admitted to ICU last month for COPD exacerbation that was complicated by Afib with RVR requiring Dilt drip.    ED: AF, mildly hypertensive w/ tachypnea and tachycardia HR 90-110s, SpO2 maintained on non-rebreather. CBC w/o leukocytosis, anemia with H/H 11.8/39.7. CMP w/ K 3.4, Cl 92, Mg 1.5, HCO3 37, AG 13.. Trop 0.061. ECG showed NSR, nonischemic. CXR shows mildly coarse interstitial attenuation, similar to the previous exam. A bandlike atelectasis projected over the right midlung zone. No large focal consolidation or pneumothorax. Initially treated w/ non-rebreather then nasal cannula, Duonebs x3, IV Solumedrol. Mg  replaced.      Past Medical History:   Diagnosis Date    Aspiration pneumonia 5/30/2021    Asthma     Atrial fibrillation with rapid ventricular response     CHF (congestive heart failure)     COPD (chronic obstructive pulmonary disease)     home O2 at night only    Coronary artery disease     Hypertension     Lung nodule     Pneumonia     Seizures        Past Surgical History:   Procedure Laterality Date    ESOPHAGOGASTRODUODENOSCOPY N/A 2/11/2022    Procedure: EGD (ESOPHAGOGASTRODUODENOSCOPY);  Surgeon: Cain Ortega MD;  Location: [facility] ENDO (2ND FLR);  Service: Endoscopy;  Laterality: N/A;    ESOPHAGOGASTRODUODENOSCOPY N/A 9/15/2022    Procedure: EGD (ESOPHAGOGASTRODUODENOSCOPY);  Surgeon: Leonid Chavez MD;  Location: [facility] ENDO (2ND FLR);  Service: Endoscopy;  Laterality: N/A;  PA-50 - 2nd floor  vacc-wears 2L o2-inst mail and verbal-clears 4 hrs prior-tb  8/30 pt rescheduled; updated instructions mailed-st    LEFT HEART CATHETERIZATION  4/23/2020    Procedure: Left heart cath;  Surgeon: Nic Pollack MD;  Location: [facility] CATH LAB;  Service: Cardiology;;       Review of patient's allergies indicates:   Allergen Reactions    Benazepril Swelling       No current facility-administered medications on file prior to encounter.     Current Outpatient Medications on File Prior to Encounter   Medication Sig    albuterol (PROVENTIL/VENTOLIN HFA) 90 mcg/actuation inhaler Inhale 1-2 puffs into the lungs every 6 (six) hours as needed for Wheezing. Rescue    albuterol-ipratropium (DUO-NEB) 2.5 mg-0.5 mg/3 mL nebulizer solution Use 1 vial (3 mLs) by nebulization every 4 (four) hours as needed for Wheezing or Shortness of Breath. Rescue    apixaban (ELIQUIS) 5 mg Tab Take 1 tablet (5 mg total) by mouth 2 (two) times daily.    arformoteroL (BROVANA) 15 mcg/2 mL Nebu Take 2 mLs (15 mcg total) by nebulization 2 (two) times daily. Controller    atorvastatin (LIPITOR) 40 MG tablet Take 1 tablet (40 mg  total) by mouth once daily.    bismuth subsalicylate (PEPTO BISMOL) 262 mg/15 mL suspension Take 30 mLs by mouth daily as needed for Indigestion.    budesonide-glycopyr-formoterol (BREZTRI AEROSPHERE) 160-9-4.8 mcg/actuation HFAA Inhale 2 puffs into the lungs 2 (two) times daily.    calcium carbonate (TUMS ORAL) Take 2 tablets by mouth daily as needed (Heartburn).    diltiaZEM (CARDIZEM) 90 MG tablet Take 2 tablets (180 mg total) by mouth once daily.    ferrous sulfate 325 (65 FE) MG EC tablet Take 1 tablet (325 mg total) by mouth once daily.    folic acid (FOLVITE) 1 MG tablet Take 1 tablet (1 mg total) by mouth once daily.    furosemide (LASIX) 20 MG tablet Take 1 tablet (20 mg total) by mouth once daily.    inhalat.spacing dev,large mask (COMPACT SPACE CHAMBER-LRG MASK) Spcr Use as directed    multivit-min-FA-lycopen-lutein (CERTAVITE SENIOR) 0.4 mg-300 mcg- 250 mcg Tab Take 1 tablet by mouth once daily.    nicotine (NICODERM CQ) 7 mg/24 hr Place 1 patch onto the skin once daily.    pantoprazole (PROTONIX) 40 MG tablet Take 1 tablet (40 mg total) by mouth once daily.    thiamine 100 MG tablet Take 1 tablet (100 mg total) by mouth once daily.    [DISCONTINUED] tiotropium bromide (SPIRIVA RESPIMAT) 2.5 mcg/actuation inhaler Inhale 2 puffs into the lungs Daily. Controller     Family History       Problem Relation (Age of Onset)    Cancer Father    Hypertension Sister    Stroke Sister, Brother          Tobacco Use    Smoking status: Some Days     Packs/day: 0.25     Types: Cigarettes    Smokeless tobacco: Never    Tobacco comments:     1-2 cigarettes per day   Substance and Sexual Activity    Alcohol use: Yes     Alcohol/week: 2.0 standard drinks     Types: 2 Cans of beer per week     Comment: every night    Drug use: No    Sexual activity: Not Currently     Review of Systems   Constitutional:  Negative for activity change, chills and fever.   HENT:  Negative for trouble swallowing.    Eyes:   Negative for photophobia and visual disturbance.   Respiratory:  Positive for cough (non-productive) and shortness of breath. Negative for chest tightness and wheezing.    Cardiovascular:  Negative for chest pain, palpitations and leg swelling.   Gastrointestinal:  Negative for abdominal pain, constipation, diarrhea, nausea and vomiting.   Genitourinary:  Negative for dysuria, frequency, hematuria and urgency.   Musculoskeletal:  Negative for arthralgias, back pain and gait problem.   Skin:  Negative for color change and rash.   Neurological:  Positive for dizziness and light-headedness. Negative for syncope, weakness, numbness and headaches.   Psychiatric/Behavioral:  Negative for agitation and confusion. The patient is not nervous/anxious.    Objective:     Vital Signs (Most Recent):  Temp: 98.1 °F (36.7 °C) (01/08/23 1605)  Pulse: (!) 113 (01/08/23 1605)  Resp: (!) 29 (01/08/23 1605)  BP: (!) 128/58 (01/08/23 1605)  SpO2: (!) 90 % (01/08/23 1605)   Vital Signs (24h Range):  Temp:  [98 °F (36.7 °C)-98.1 °F (36.7 °C)] 98.1 °F (36.7 °C)  Pulse:  [] 113  Resp:  [20-50] 29  SpO2:  [89 %-100 %] 90 %  BP: (128-151)/(58-96) 128/58     Weight: 61.2 kg (134 lb 14.7 oz)  Body mass index is 20.51 kg/m².    Physical Exam  Vitals and nursing note reviewed.   Constitutional:       General: He is in acute distress.      Appearance: He is well-developed.   HENT:      Head: Normocephalic and atraumatic.      Mouth/Throat:      Pharynx: No oropharyngeal exudate.   Eyes:      Conjunctiva/sclera: Conjunctivae normal.      Pupils: Pupils are equal, round, and reactive to light.   Cardiovascular:      Rate and Rhythm: Regular rhythm. Tachycardia present.      Heart sounds: Normal heart sounds.   Pulmonary:      Effort: Tachypnea and respiratory distress present.      Breath sounds: Wheezing present.   Abdominal:      General: Bowel sounds are normal. There is no distension.      Palpations: Abdomen is soft.      Tenderness: There  is no abdominal tenderness.   Musculoskeletal:         General: No tenderness. Normal range of motion.      Cervical back: Normal range of motion and neck supple.      Right lower leg: No edema.      Left lower leg: No edema.   Lymphadenopathy:      Cervical: No cervical adenopathy.   Skin:     General: Skin is warm and dry.      Capillary Refill: Capillary refill takes less than 2 seconds.      Findings: No rash.   Neurological:      Mental Status: He is alert and oriented to person, place, and time.      Cranial Nerves: No cranial nerve deficit.      Sensory: No sensory deficit.      Coordination: Coordination normal.   Psychiatric:         Behavior: Behavior normal.         Thought Content: Thought content normal.         Judgment: Judgment normal.         CRANIAL NERVES     CN III, IV, VI   Pupils are equal, round, and reactive to light.     Significant Labs: All pertinent labs within the past 24 hours have been reviewed.  ABGs:   Recent Labs   Lab 01/08/23  1300   PH 7.471*   PCO2 60.0*   HCO3 43.8*   POCSATURATED 95   BE 20   PO2 76*     BMP:   Recent Labs   Lab 01/08/23  1333   GLU 90      K 3.4*   CL 92*   CO2 37*   BUN 5*   CREATININE 0.9   CALCIUM 8.9   MG 1.5*     CBC:   Recent Labs   Lab 01/08/23  1333   WBC 9.72   HGB 11.8*   HCT 39.7*        CMP:   Recent Labs   Lab 01/08/23  1333      K 3.4*   CL 92*   CO2 37*   GLU 90   BUN 5*   CREATININE 0.9   CALCIUM 8.9   PROT 7.5   ALBUMIN 3.2*   BILITOT 0.4   ALKPHOS 100   AST 20   ALT 14   ANIONGAP 13     Cardiac Markers:   Recent Labs   Lab 01/08/23  1333   *     Magnesium:   Recent Labs   Lab 01/08/23  1333   MG 1.5*     Troponin:   Recent Labs   Lab 01/08/23  1333   TROPONINI 0.061*     TSH:   Recent Labs   Lab 12/05/22 2004   TSH 1.137       Significant Imaging: I have reviewed all pertinent imaging results/findings within the past 24 hours.  Imaging Results              X-Ray Chest AP Portable (Final result)  Result time  01/08/23 14:02:10      Final result by Davide Faria MD (01/08/23 14:02:10)                   Impression:      1. Allowing for patient rotation, stable appearing chronic interstitial findings, no large focal consolidation.      Electronically signed by: Davide Faria MD  Date:    01/08/2023  Time:    14:02               Narrative:    EXAMINATION:  XR CHEST AP PORTABLE    CLINICAL HISTORY:  sob;    TECHNIQUE:  Single frontal view of the chest was performed.    COMPARISON:  12/05/2022    FINDINGS:  The cardiomediastinal silhouette is not enlarged, stable in configuration noting patient is rotated..  There is no pleural effusion.  The trachea is midline.  The lungs are symmetrically expanded bilaterally with mildly coarse interstitial attenuation, similar to the previous exam.  There is bandlike atelectasis projected over the right midlung zone..  No large focal consolidation seen.  There is no pneumothorax.  The osseous structures are remarkable for degenerative changes..  Pulmonary nodule identified on CT 08/11/2022 is not readily apparent by plain radiography, please see that rib                                        Assessment/Plan:     * COPD exacerbation  Acute on chronic respiratory failure with hypoxia and hypercapnia  Patient's COPD is with exacerbation noted by continued dyspnea and worsening of baseline hypoxia currently.  - Patient is currently on COPD Pathway.   - Continue scheduled inhalers Steroids and Supplemental oxygen and monitor respiratory status closely.  - Patient with Hypercapnic and Hypoxic Respiratory failure which is acute on chronic.   - He is on home oxygen at 2 LPM.  - Supplemental oxygen was provided and noted.  - Signs/symptoms of respiratory failure include tachypnea, increased work of breathing, respiratory distress and wheezing.  - Contributing diagnoses includes CHF and COPD  - Labs and images were reviewed. Patient has not had a recent ABG.   - Will treat underlying causes  and adjust management of respiratory failure as follows  - 1/4 SIRS, HR>90  - SpO2 90% on 4 L NC  - HCO3 37, AG 13  - CXR shows mildly coarse interstitial attenuation, similar to the previous exam. A bandlike atelectasis projected over the right midlung zone. No large focal consolidation or pneumothorax.   - In ED, s/p Duonebs x3   - IV Solumedrol x1, start Prednisone 40 mg qD  - Duonebs q6h  - ABG reviewed  - BiPAP qhs  - Incentive spirometry  - Monitoring on tele  - Continue to monitor vitals    Hypomagnesemia  - Mg 1.5, replaced  - monitor w/ BMP    Hypokalemia  - K 3.4, replaced  - monitor w/ BMP    Anemia  - Hgb 11.8, stable  - No s/s acute bleeding  - Transfuse hgb<7  - Trend on daily CBC  - Continue Folate, Iron supplementation qD    Gastric ulcer  - Continue PPI    Atrial fibrillation  Patient with Paroxysmal (<7 days) atrial fibrillation which is controlled currently with Calcium Channel Blocker. Patient is currently in sinus rhythm.[id number] Score: 1. Anticoagulation indicated. Anticoagulation done with Eliquis.  - Continue Diltiazem 180 mg qD  - Continue Eliquis 5 mg BID    Alcohol use disorder, mild, abuse  - Continue MV qD, Thiamine 100 mg qD  - CIWA    Nonobstructive atherosclerosis of coronary artery  - continue Lipitor 40 mg qD    Chronic diastolic congestive heart failure  Patient is identified as having Diastolic (HFpEF) heart failure that is Chronic. CHF is currently controlledLatest ECHO performed and demonstrates  Results for orders placed during the hospital encounter of 11/18/22  Echo  Interpretation Summary  · The left ventricle is normal in size with normal systolic function.  · The estimated ejection fraction is 53%.  · There are segmental left ventricular wall motion abnormalities.  · Normal left ventricular diastolic function.  · There is abnormal septal wall motion.  · Normal right ventricular size with normal right ventricular systolic function.  · Normal central venous pressure (3  mmHg).  · There is a small right pleural effusion.  Continue Furosemide and monitor clinical status closely. Monitor on telemetry. Patient is off CHF pathway.  Monitor strict Is&Os and daily weights.  Place on fluid restriction of 1.5 L. Continue to stress to patient importance of self efficacy and  on diet for CHF. Last BNP reviewed- and noted below   Recent Labs   Lab 01/08/23  1333   *   - Cardiac diet  - Continue home Lasix 20 mg qD    Hx of seizure disorder  - Seizure precautions    Essential hypertension  - Continue home Diltiazem 180 mg qD, Lasix 20 mg qD    VTE Risk Mitigation (From admission, onward)         Ordered     apixaban tablet 5 mg  2 times daily         01/08/23 1614     Reason for No Pharmacological VTE Prophylaxis  Once        Question:  Reasons:  Answer:  Already adequately anticoagulated on oral Anticoagulants    01/08/23 1503     IP VTE HIGH RISK PATIENT  Once         01/08/23 1503     Place sequential compression device  Until discontinued         01/08/23 1503                   Delbert Garcia PA-C  Department of Hospital Medicine   [facility] - Emergency Dept

## 2023-01-08 NOTE — HPI
Mr. Mccray is a 62-year-old male with a history of atrial fibrillation (on Eliquis), CHF, seizures, CAD, HTN, and COPD (on 2 L O2 at home) presenting to the ED with worsening shortness of breath x 3 days. Per EMS, was found to have O2 sat of 80% on RA at home. Patient reports SOB is worse with exertion and is associated with lightheadedness and dizziness. He has been using his home breathing treatments and BiPAP with minimal improvement. Patient reports compliance with home medications. However, reports smoking 2 cigarettes 3 days ago. He denies any fevers, chills, chest pain, orthopnea, sick contacts, recent infections, congestion, n/v/d, abdominal pain, dysuria, leg pain or swelling. Of note, patient was admitted to ICU last month for COPD exacerbation that was complicated by Afib with RVR requiring Dilt drip.    ED: AF, mildly hypertensive w/ tachypnea and tachycardia HR 90-110s, SpO2 maintained on non-rebreather. CBC w/o leukocytosis, anemia with H/H 11.8/39.7. CMP w/ K 3.4, Cl 92, Mg 1.5, HCO3 37, AG 13.. Trop 0.061. ECG showed NSR, nonischemic. CXR shows mildly coarse interstitial attenuation, similar to the previous exam. A bandlike atelectasis projected over the right midlung zone. No large focal consolidation or pneumothorax. Initially treated w/ non-rebreather then nasal cannula, Duonebs x3, IV Solumedrol. Mg replaced.

## 2023-01-08 NOTE — ASSESSMENT & PLAN NOTE
Patient is identified as having Diastolic (HFpEF) heart failure that is Chronic. CHF is currently controlledLatest ECHO performed and demonstrates  Results for orders placed during the hospital encounter of 11/18/22  Echo  Interpretation Summary  · The left ventricle is normal in size with normal systolic function.  · The estimated ejection fraction is 53%.  · There are segmental left ventricular wall motion abnormalities.  · Normal left ventricular diastolic function.  · There is abnormal septal wall motion.  · Normal right ventricular size with normal right ventricular systolic function.  · Normal central venous pressure (3 mmHg).  · There is a small right pleural effusion.  Continue Furosemide and monitor clinical status closely. Monitor on telemetry. Patient is off CHF pathway.  Monitor strict Is&Os and daily weights.  Place on fluid restriction of 1.5 L. Continue to stress to patient importance of self efficacy and  on diet for CHF. Last BNP reviewed- and noted below   Recent Labs   Lab 01/08/23  1333   *   - Cardiac diet  - Continue home Lasix 20 mg qD

## 2023-01-08 NOTE — ED NOTES
Upon arrival to room, found pt to be placed on 10L non-rebreather mask, pt placed onto 3L NC, maintaining 99% SpO2. Instructed pt would return to room to re-evaluate and perform ambulatory sats

## 2023-01-08 NOTE — ED NOTES
Pt requesting humidified O2, respiratory informed, states cannot tolerate NC at 4L, SpO2 88% on 3L

## 2023-01-08 NOTE — ED PROVIDER NOTES
Encounter Date: 1/8/2023       History     Chief Complaint   Patient presents with    Shortness of Breath     Pt complains of SOB, room air 80%. Pt normally on 2L nasal cannula, hx of COPD.      DEEP Mccray is a 62-year-old male with a history of atrial fibrillation, CHF, COPD, CAD, hypertension, pneumonia, seizures and history of aspiration pneumonia presenting with shortness of breath.  Patient arrives via EMS on nasal cannula via EMS.  He was found to have room air oxygen of 80% at home.  He is currently on home oxygen support of 2 L nasal cannula.  He does have a history of COPD, and uses his home breathing treatments.  Was unable to catch his breath at home, worsening symptoms over the last 2-3 days.  He denies any associated fevers, chills, nausea, vomiting, chest pain, back pain, neck pain but does endorse feeling lightheaded and dizzy when he is short of breath.  He also endorses worsening dyspnea with any activity.  He denies any early satiety, jaw pain, arm pain, leg pain or leg swelling.  No other aggravating or alleviating factors.    Review of patient's allergies indicates:   Allergen Reactions    Benazepril Swelling     Past Medical History:   Diagnosis Date    Aspiration pneumonia 5/30/2021    Asthma     Atrial fibrillation with rapid ventricular response     CHF (congestive heart failure)     COPD (chronic obstructive pulmonary disease)     home O2 at night only    Coronary artery disease     Hypertension     Lung nodule     Pneumonia     Seizures      Past Surgical History:   Procedure Laterality Date    ESOPHAGOGASTRODUODENOSCOPY N/A 2/11/2022    Procedure: EGD (ESOPHAGOGASTRODUODENOSCOPY);  Surgeon: Cain Ortega MD;  Location: [address]);  Service: Endoscopy;  Laterality: N/A;    ESOPHAGOGASTRODUODENOSCOPY N/A 9/15/2022    Procedure: EGD (ESOPHAGOGASTRODUODENOSCOPY);  Surgeon: Leonid Chavez MD;  Location: [facility] ([address] [place] [address]);  Service: Endoscopy;  Laterality: N/A;  PA- - [facility]  floor  vacc-wears 2L o2-inst mail and verbal-clears 4 hrs prior-tb  8/30 pt rescheduled; updated instructions mailed-st    LEFT HEART CATHETERIZATION  4/23/2020    Procedure: Left heart cath;  Surgeon: Nic Pollack MD;  Location: [facility] CATH LAB;  Service: Cardiology;;     Family History   Problem Relation Age of Onset    Cancer Father     Hypertension Sister     Stroke Sister     Stroke Brother     Diabetes Neg Hx     Heart disease Neg Hx      Social History     Tobacco Use    Smoking status: Some Days     Packs/day: 0.25     Types: Cigarettes    Smokeless tobacco: Never    Tobacco comments:     1-2 cigarettes per day   Substance Use Topics    Alcohol use: Yes     Alcohol/week: 2.0 standard drinks     Types: 2 Cans of beer per week     Comment: every night    Drug use: No     Review of Systems   Constitutional:  Positive for fatigue. Negative for chills and fever.   HENT:  Negative for congestion and sore throat.    Eyes:  Negative for photophobia and visual disturbance.   Respiratory:  Positive for cough, shortness of breath and wheezing. Negative for chest tightness.    Cardiovascular:  Negative for chest pain, palpitations and leg swelling.   Gastrointestinal:  Negative for abdominal pain, nausea and vomiting.   Genitourinary:  Negative for dysuria, flank pain and frequency.   Musculoskeletal:  Negative for back pain and myalgias.   Skin:  Negative for color change and wound.   Neurological:  Positive for light-headedness. Negative for facial asymmetry, weakness and headaches.   Psychiatric/Behavioral:  Negative for confusion. The patient is not nervous/anxious.      Physical Exam     Initial Vitals [01/08/23 1255]   BP Pulse Resp Temp SpO2   (!) 148/96 102 20 98 °F (36.7 °C) 99 %      MAP       --         Physical Exam    Nursing note and vitals reviewed.    Gen/Constitutional: Interactive.  Acute respiratory distress  Head: Normocephalic, Atraumatic  Neck: supple, no masses or LAD, no JVD  Eyes: PERRLA,  conjunctiva clear  Ears, Nose and Throat: No rhinorrhea or stridor.  Cardiac:  Tachycardia rate, Reg Rhythm, No murmur  Pulmonary:  Diminished breath sounds at the bases, bilateral wheezes, tachypnea, increased work of breathing  GI: Abdomen soft, non-tender, non-distended; no rebound or guarding  : No CVA tenderness.  Musculoskeletal: Extremities warm, well perfused, no erythema, no edema  Skin: No rashes, cyanosis or jaundice.  Neuro: Alert and Oriented x 3; No focal motor or sensory deficits.    Psych: Normal affect      ED Course   Critical Care    Date/Time: 1/8/2023 2:45 PM  Performed by: Guilherme Munoz DO  Authorized by: Guilherme Munoz DO   Direct patient critical care time: 15 minutes  Additional history critical care time: 15 minutes  Ordering / reviewing critical care time: 25 minutes  Documentation critical care time: 8 minutes  Consulting other physicians critical care time: 8 minutes  Total critical care time (exclusive of procedural time) : 71 minutes  Critical care was necessary to treat or prevent imminent or life-threatening deterioration of the following conditions: respiratory failure.  Critical care was time spent personally by me on the following activities: blood draw for specimens, development of treatment plan with patient or surrogate, discussions with consultants, examination of patient, evaluation of patient's response to treatment, obtaining history from patient or surrogate, ordering and performing treatments and interventions, ordering and review of laboratory studies, ordering and review of radiographic studies, pulse oximetry, re-evaluation of patient's condition and review of old charts.      Labs Reviewed   CBC W/ AUTO DIFFERENTIAL - Abnormal; Notable for the following components:       Result Value    Hemoglobin 11.8 (*)     Hematocrit 39.7 (*)     MCH 24.7 (*)     MCHC 29.7 (*)     RDW 19.0 (*)     MPV 8.9 (*)     All other components within normal limits   COMPREHENSIVE  METABOLIC PANEL - Abnormal; Notable for the following components:    Potassium 3.4 (*)     Chloride 92 (*)     CO2 37 (*)     BUN 5 (*)     Albumin 3.2 (*)     All other components within normal limits   MAGNESIUM - Abnormal; Notable for the following components:    Magnesium 1.5 (*)     All other components within normal limits   TROPONIN I - Abnormal; Notable for the following components:    Troponin I 0.061 (*)     All other components within normal limits   B-TYPE NATRIURETIC PEPTIDE - Abnormal; Notable for the following components:     (*)     All other components within normal limits   TROPONIN I - Abnormal; Notable for the following components:    Troponin I 0.070 (*)     All other components within normal limits   ISTAT PROCEDURE - Abnormal; Notable for the following components:    POC PH 7.471 (*)     POC PCO2 60.0 (*)     POC PO2 76 (*)     POC HCO3 43.8 (*)     POC TCO2 46 (*)     All other components within normal limits   POCT GLUCOSE - Abnormal; Notable for the following components:    POCT Glucose 190 (*)     All other components within normal limits     EKG Readings: (Independently Interpreted)   Initial Reading: No STEMI. Previous EKG: Compared with most recent EKG Rhythm: Normal Sinus Rhythm. Heart Rate: 90. ST Segments: Non-Specific ST Segment Depression.   T-wave abnormalities   ECG Results              EKG 12-lead (Final result)  Result time 01/09/23 09:31:06      Final result by Interface, Lab In [facility] (01/09/23 09:31:06)                   Narrative:    Test Reason : R06.02,    Vent. Rate : 090 BPM     Atrial Rate : 090 BPM     P-R Int : 164 ms          QRS Dur : 086 ms      QT Int : 362 ms       P-R-T Axes : 080 083 076 degrees     QTc Int : 442 ms    Normal sinus rhythm  Right atrial enlargement  Low septal forces and septal Q V2  T wave abnormality, consider anterior ischemia  Abnormal ECG  When compared with ECG of 08-DEC-2022 08:33,  Septal Q present  T wave amplitude has  decreased in Inferior leads  Confirmed by Bryan DOBBINS MD ([id number]) on 1/9/2023 9:30:52 AM    Referred By: [email or web address]   SELF           Confirmed By:Bryan DOBBINS MD                                  Imaging Results              X-Ray Chest AP Portable (Final result)  Result time 01/08/23 14:02:10      Final result by Davide Faria MD (01/08/23 14:02:10)                   Impression:      1. Allowing for patient rotation, stable appearing chronic interstitial findings, no large focal consolidation.      Electronically signed by: Davide Faria MD  Date:    01/08/2023  Time:    14:02               Narrative:    EXAMINATION:  XR CHEST AP PORTABLE    CLINICAL HISTORY:  sob;    TECHNIQUE:  Single frontal view of the chest was performed.    COMPARISON:  12/05/2022    FINDINGS:  The cardiomediastinal silhouette is not enlarged, stable in configuration noting patient is rotated..  There is no pleural effusion.  The trachea is midline.  The lungs are symmetrically expanded bilaterally with mildly coarse interstitial attenuation, similar to the previous exam.  There is bandlike atelectasis projected over the right midlung zone..  No large focal consolidation seen.  There is no pneumothorax.  The osseous structures are remarkable for degenerative changes..  Pulmonary nodule identified on CT 08/11/2022 is not readily apparent by plain radiography, please see that rib                                    X-Rays:   Independently Interpreted Readings:   Chest X-Ray: Normal heart size. Chronic interstitial findings, no pneumothorax or free air   Medications   guaiFENesin 12 hr tablet 600 mg (600 mg Oral Given 1/9/23 0915)   simethicone chewable tablet 80 mg (has no administration in time range)   aluminum-magnesium hydroxide-simethicone 200-200-20 mg/5 mL suspension 30 mL (30 mLs Oral Given 1/9/23 0913)   naloxone 0.4 mg/mL injection 0.02 mg (has no administration in time range)   glucose chewable tablet 16 g (has no administration in  time range)   glucose chewable tablet 24 g (has no administration in time range)   glucagon (human recombinant) injection 1 mg (has no administration in time range)   sodium chloride 0.9% flush 3 mL (has no administration in time range)   predniSONE tablet 40 mg (40 mg Oral Given 1/9/23 0915)   albuterol-ipratropium 2.5 mg-0.5 mg/3 mL nebulizer solution 3 mL (3 mLs Nebulization Given 1/9/23 0741)   dextrose 10% bolus 125 mL 125 mL (has no administration in time range)   dextrose 10% bolus 250 mL 250 mL (has no administration in time range)   melatonin tablet 6 mg (has no administration in time range)   ondansetron disintegrating tablet 8 mg (has no administration in time range)   ondansetron injection 4 mg (has no administration in time range)   polyethylene glycol packet 17 g (has no administration in time range)   acetaminophen tablet 650 mg (has no administration in time range)   apixaban tablet 5 mg (5 mg Oral Given 1/9/23 0914)   atorvastatin tablet 40 mg (40 mg Oral Given 1/9/23 0914)   diltiaZEM tablet 180 mg (180 mg Oral Given 1/9/23 0913)   ferrous sulfate tablet 1 each (1 each Oral Given 1/9/23 0914)   furosemide tablet 20 mg (20 mg Oral Given 1/9/23 0914)   folic acid tablet 1 mg (1 mg Oral Given 1/9/23 0915)   pantoprazole EC tablet 40 mg (40 mg Oral Given 1/9/23 0914)   thiamine tablet 100 mg (100 mg Oral Given 1/9/23 0915)   hydrALAZINE tablet 25 mg (has no administration in time range)   albuterol-ipratropium 2.5 mg-0.5 mg/3 mL nebulizer solution 3 mL (3 mLs Nebulization Given 1/8/23 1357)   methylPREDNISolone sodium succinate injection 125 mg (125 mg Intravenous Given 1/8/23 1346)   magnesium sulfate 2g in water 50mL IVPB (premix) (0 g Intravenous Stopped 1/8/23 1430)   albuterol-ipratropium 2.5 mg-0.5 mg/3 mL nebulizer solution 3 mL (3 mLs Nebulization Given 1/8/23 1506)   potassium chloride SA CR tablet 40 mEq (40 mEq Oral Given 1/8/23 2230)     Medical Decision Making:   History:   I obtained  history from: EMS provider.       <> Summary of History: Brought in by EMS for hypoxemia, oxygen saturation less than 80%, shortness of breath  Old Medical Records: I decided to obtain old medical records.  Old Records Summarized: records from previous admission(s).       <> Summary of Records: 12/9:  Discharge summary - At the ED patient found to be in AF RVR with rates up to 170 however hemodynamically stable. CBC/CMP not overtly unremarkable. Troponin 0.161, . ABG 7.37/66. CXR showing hyperinflated lungs/flattened diaphrams without any consolidation. He was given IV methylpred, duonebs, and diltiazem. When seen patient visibly tachypneic on BiPAP. Patient admitted to MICU for further care.      Initial Assessment:   Baltazar Mccray is a 62-year-old male with a history of atrial fibrillation, CHF, COPD, CAD, hypertension, pneumonia, seizures and history of aspiration pneumonia presenting with shortness of breath.   Differential Diagnosis:   COPD exacerbation, CHF, ACS, PE, pneumonia, pneumothorax, pericarditis, dissection  Independently Interpreted Test(s):   I have ordered and independently interpreted X-rays - see prior notes.  I have ordered and independently interpreted EKG Reading(s) - see prior notes  Clinical Tests:   Lab Tests: Ordered and Reviewed  Radiological Study: Ordered and Reviewed  Medical Tests: Ordered and Reviewed                 Emergent evaluation of patient presenting with hypoxemic respiratory failure, tachypnea, tachycardia and hypoxia.  He is an acute respiratory distress.  He likely has COPD exacerbation with acute on chronic respiratory failure with hypoxia.  Patient given oxygen support, DuoNebs and IV Solu-Medrol.  ECG without ischemia or STEMI on my read.  He was placed on cardiac and telemetry monitoring.  Supplemental oxygen was provided.  IV line placed, labs were drawn.  Heart rate greater than 90, oxygen saturation less than 90% requiring 2-4 L nasal cannula.  He does have  an anion gap of 13, and elevated pCO2 on venous blood gas concerning for hypercapnia.  He also has a history of CHF, which may be a confounding factor.  Exam without lower extremity edema but he does have coarse rhonchi throughout his lung fields.  No crackles, no JVD.  Chest x-ray shows interstitial attenuation but no focal consolidation, pneumothorax or free air on my read.  No large consolidation.  Continuous treatment with DuoNebs required.  He was also given magnesium sulfate to improve respiratory ventilation.  His magnesium was 1.5 which was repleted.  Hemoglobin stable.  Cardiac enzymes without significant elevation from baseline.  Potassium stable.  He is currently controlled with his atrial fibrillation history.  He is currently on Eliquis and calcium channel blocker.  Discussed case with hospital medicine, given acute hypoxemic respiratory failure and hypercapnia with severe COPD exacerbation, will admit to observation for continued management.  Patient agreeable to observation plan.  Please see critical care note for critical care time.    Complexity:  Critical Care       Clinical Impression:   Final diagnoses:  [R06.02] SOB (shortness of breath)  [J44.1] COPD exacerbation (Primary)  [Z86.79] History of CHF (congestive heart failure)  [J96.11] Hypoxemic respiratory failure, chronic        ED Disposition Condition    Observation Stable               Guilherme Munoz DO, FAAEM  Emergency Staff Physician   Dept of Emergency Medicine   [facility]  Spectralink: [id number]        Disclaimer: This note has been generated using voice-recognition software. There may be typographical errors that have been missed during proof-reading.       Guilherme Munoz DO  01/09/23 1024

## 2023-01-08 NOTE — ED TRIAGE NOTES
The pt is a 62-year-old male who presents to the ED via EMS from home. The pt was found by EMS to be 80% on room air, pt endorses cigarette smoking, hx of COPD, and is on 2L supplemental O2 via NC at home. Pt states that he has been unable to catch his breath while at home. States he was in the hospital for a COPD exacerbation last month.

## 2023-01-08 NOTE — ASSESSMENT & PLAN NOTE
Patient with Paroxysmal (<7 days) atrial fibrillation which is controlled currently with Calcium Channel Blocker. Patient is currently in sinus rhythm.[id number] Score: 1. Anticoagulation indicated. Anticoagulation done with Eliquis.  - Continue Diltiazem 180 mg qD  - Continue Eliquis 5 mg BID

## 2023-01-08 NOTE — SUBJECTIVE & OBJECTIVE
Past Medical History:   Diagnosis Date    Aspiration pneumonia 5/30/2021    Asthma     Atrial fibrillation with rapid ventricular response     CHF (congestive heart failure)     COPD (chronic obstructive pulmonary disease)     home O2 at night only    Coronary artery disease     Hypertension     Lung nodule     Pneumonia     Seizures        Past Surgical History:   Procedure Laterality Date    ESOPHAGOGASTRODUODENOSCOPY N/A 2/11/2022    Procedure: EGD (ESOPHAGOGASTRODUODENOSCOPY);  Surgeon: Cain Ortega MD;  Location: [facility] ENDO (2ND FLR);  Service: Endoscopy;  Laterality: N/A;    ESOPHAGOGASTRODUODENOSCOPY N/A 9/15/2022    Procedure: EGD (ESOPHAGOGASTRODUODENOSCOPY);  Surgeon: Leonid Chavez MD;  Location: [facility] ENDO (2ND FLR);  Service: Endoscopy;  Laterality: N/A;  PA-50 - 2nd floor  vacc-wears 2L o2-inst mail and verbal-clears 4 hrs prior-tb  8/30 pt rescheduled; updated instructions mailed-    LEFT HEART CATHETERIZATION  4/23/2020    Procedure: Left heart cath;  Surgeon: Nic Pollack MD;  Location: [facility] CATH LAB;  Service: Cardiology;;       Review of patient's allergies indicates:   Allergen Reactions    Benazepril Swelling       No current facility-administered medications on file prior to encounter.     Current Outpatient Medications on File Prior to Encounter   Medication Sig    albuterol (PROVENTIL/VENTOLIN HFA) 90 mcg/actuation inhaler Inhale 1-2 puffs into the lungs every 6 (six) hours as needed for Wheezing. Rescue    albuterol-ipratropium (DUO-NEB) 2.5 mg-0.5 mg/3 mL nebulizer solution Use 1 vial (3 mLs) by nebulization every 4 (four) hours as needed for Wheezing or Shortness of Breath. Rescue    apixaban (ELIQUIS) 5 mg Tab Take 1 tablet (5 mg total) by mouth 2 (two) times daily.    arformoteroL (BROVANA) 15 mcg/2 mL Nebu Take 2 mLs (15 mcg total) by nebulization 2 (two) times daily. Controller    atorvastatin (LIPITOR) 40 MG tablet Take 1 tablet (40 mg total) by mouth once daily.    bismuth  subsalicylate (PEPTO BISMOL) 262 mg/15 mL suspension Take 30 mLs by mouth daily as needed for Indigestion.    budesonide-glycopyr-formoterol (BREZTRI AEROSPHERE) 160-9-4.8 mcg/actuation HFAA Inhale 2 puffs into the lungs 2 (two) times daily.    calcium carbonate (TUMS ORAL) Take 2 tablets by mouth daily as needed (Heartburn).    diltiaZEM (CARDIZEM) 90 MG tablet Take 2 tablets (180 mg total) by mouth once daily.    ferrous sulfate 325 (65 FE) MG EC tablet Take 1 tablet (325 mg total) by mouth once daily.    folic acid (FOLVITE) 1 MG tablet Take 1 tablet (1 mg total) by mouth once daily.    furosemide (LASIX) 20 MG tablet Take 1 tablet (20 mg total) by mouth once daily.    inhalat.spacing dev,large mask (COMPACT SPACE CHAMBER-LRG MASK) Spcr Use as directed    multivit-min-FA-lycopen-lutein (CERTAVITE SENIOR) 0.4 mg-300 mcg- 250 mcg Tab Take 1 tablet by mouth once daily.    nicotine (NICODERM CQ) 7 mg/24 hr Place 1 patch onto the skin once daily.    pantoprazole (PROTONIX) 40 MG tablet Take 1 tablet (40 mg total) by mouth once daily.    thiamine 100 MG tablet Take 1 tablet (100 mg total) by mouth once daily.    [DISCONTINUED] tiotropium bromide (SPIRIVA RESPIMAT) 2.5 mcg/actuation inhaler Inhale 2 puffs into the lungs Daily. Controller     Family History       Problem Relation (Age of Onset)    Cancer Father    Hypertension Sister    Stroke Sister, Brother          Tobacco Use    Smoking status: Some Days     Packs/day: 0.25     Types: Cigarettes    Smokeless tobacco: Never    Tobacco comments:     1-2 cigarettes per day   Substance and Sexual Activity    Alcohol use: Yes     Alcohol/week: 2.0 standard drinks     Types: 2 Cans of beer per week     Comment: every night    Drug use: No    Sexual activity: Not Currently     Review of Systems   Constitutional:  Negative for activity change, chills and fever.   HENT:  Negative for trouble swallowing.    Eyes:  Negative for photophobia and visual disturbance.   Respiratory:   Positive for cough (non-productive) and shortness of breath. Negative for chest tightness and wheezing.    Cardiovascular:  Negative for chest pain, palpitations and leg swelling.   Gastrointestinal:  Negative for abdominal pain, constipation, diarrhea, nausea and vomiting.   Genitourinary:  Negative for dysuria, frequency, hematuria and urgency.   Musculoskeletal:  Negative for arthralgias, back pain and gait problem.   Skin:  Negative for color change and rash.   Neurological:  Positive for dizziness and light-headedness. Negative for syncope, weakness, numbness and headaches.   Psychiatric/Behavioral:  Negative for agitation and confusion. The patient is not nervous/anxious.    Objective:     Vital Signs (Most Recent):  Temp: 98.1 °F (36.7 °C) (01/08/23 1605)  Pulse: (!) 113 (01/08/23 1605)  Resp: (!) 29 (01/08/23 1605)  BP: (!) 128/58 (01/08/23 1605)  SpO2: (!) 90 % (01/08/23 1605)   Vital Signs (24h Range):  Temp:  [98 °F (36.7 °C)-98.1 °F (36.7 °C)] 98.1 °F (36.7 °C)  Pulse:  [] 113  Resp:  [20-50] 29  SpO2:  [89 %-100 %] 90 %  BP: (128-151)/(58-96) 128/58     Weight: 61.2 kg (134 lb 14.7 oz)  Body mass index is 20.51 kg/m².    Physical Exam  Vitals and nursing note reviewed.   Constitutional:       General: He is in acute distress.      Appearance: He is well-developed.   HENT:      Head: Normocephalic and atraumatic.      Mouth/Throat:      Pharynx: No oropharyngeal exudate.   Eyes:      Conjunctiva/sclera: Conjunctivae normal.      Pupils: Pupils are equal, round, and reactive to light.   Cardiovascular:      Rate and Rhythm: Regular rhythm. Tachycardia present.      Heart sounds: Normal heart sounds.   Pulmonary:      Effort: Tachypnea and respiratory distress present.      Breath sounds: Wheezing present.   Abdominal:      General: Bowel sounds are normal. There is no distension.      Palpations: Abdomen is soft.      Tenderness: There is no abdominal tenderness.   Musculoskeletal:         General:  No tenderness. Normal range of motion.      Cervical back: Normal range of motion and neck supple.      Right lower leg: No edema.      Left lower leg: No edema.   Lymphadenopathy:      Cervical: No cervical adenopathy.   Skin:     General: Skin is warm and dry.      Capillary Refill: Capillary refill takes less than 2 seconds.      Findings: No rash.   Neurological:      Mental Status: He is alert and oriented to person, place, and time.      Cranial Nerves: No cranial nerve deficit.      Sensory: No sensory deficit.      Coordination: Coordination normal.   Psychiatric:         Behavior: Behavior normal.         Thought Content: Thought content normal.         Judgment: Judgment normal.         CRANIAL NERVES     CN III, IV, VI   Pupils are equal, round, and reactive to light.     Significant Labs: All pertinent labs within the past 24 hours have been reviewed.  ABGs:   Recent Labs   Lab 01/08/23  1300   PH 7.471*   PCO2 60.0*   HCO3 43.8*   POCSATURATED 95   BE 20   PO2 76*     BMP:   Recent Labs   Lab 01/08/23  1333   GLU 90      K 3.4*   CL 92*   CO2 37*   BUN 5*   CREATININE 0.9   CALCIUM 8.9   MG 1.5*     CBC:   Recent Labs   Lab 01/08/23  1333   WBC 9.72   HGB 11.8*   HCT 39.7*        CMP:   Recent Labs   Lab 01/08/23  1333      K 3.4*   CL 92*   CO2 37*   GLU 90   BUN 5*   CREATININE 0.9   CALCIUM 8.9   PROT 7.5   ALBUMIN 3.2*   BILITOT 0.4   ALKPHOS 100   AST 20   ALT 14   ANIONGAP 13     Cardiac Markers:   Recent Labs   Lab 01/08/23  1333   *     Magnesium:   Recent Labs   Lab 01/08/23  1333   MG 1.5*     Troponin:   Recent Labs   Lab 01/08/23  1333   TROPONINI 0.061*     TSH:   Recent Labs   Lab 12/05/22 2004   TSH 1.137       Significant Imaging: I have reviewed all pertinent imaging results/findings within the past 24 hours.  Imaging Results              X-Ray Chest AP Portable (Final result)  Result time 01/08/23 14:02:10      Final result by Davide Faria MD (01/08/23  14:02:10)                   Impression:      1. Allowing for patient rotation, stable appearing chronic interstitial findings, no large focal consolidation.      Electronically signed by: Davide Faria MD  Date:    01/08/2023  Time:    14:02               Narrative:    EXAMINATION:  XR CHEST AP PORTABLE    CLINICAL HISTORY:  sob;    TECHNIQUE:  Single frontal view of the chest was performed.    COMPARISON:  12/05/2022    FINDINGS:  The cardiomediastinal silhouette is not enlarged, stable in configuration noting patient is rotated..  There is no pleural effusion.  The trachea is midline.  The lungs are symmetrically expanded bilaterally with mildly coarse interstitial attenuation, similar to the previous exam.  There is bandlike atelectasis projected over the right midlung zone..  No large focal consolidation seen.  There is no pneumothorax.  The osseous structures are remarkable for degenerative changes..  Pulmonary nodule identified on CT 08/11/2022 is not readily apparent by plain radiography, please see that rib

## 2023-01-08 NOTE — ED NOTES
DANIEL Mandujano, at bedside. Pt reports burning with NC, lubricant applied to nares, O2 increased to 4L, SpO2 92%. Ear probe placed. Pt short of breath when speaking with provider.

## 2023-01-09 LAB
ANION GAP SERPL CALC-SCNC: 12 MMOL/L (ref 8–16)
BASOPHILS # BLD AUTO: 0 K/UL (ref 0–0.2)
BASOPHILS NFR BLD: 0 % (ref 0–1.9)
BUN SERPL-MCNC: 10 MG/DL (ref 6–20)
CALCIUM SERPL-MCNC: 8.9 MG/DL (ref 8.7–10.5)
CHLORIDE SERPL-SCNC: 95 MMOL/L (ref 95–110)
CO2 SERPL-SCNC: 31 MMOL/L (ref 23–29)
CREAT SERPL-MCNC: 0.8 MG/DL (ref 0.5–1.4)
DIFFERENTIAL METHOD: ABNORMAL
EOSINOPHIL # BLD AUTO: 0 K/UL (ref 0–0.5)
EOSINOPHIL NFR BLD: 0 % (ref 0–8)
ERYTHROCYTE [DISTWIDTH] IN BLOOD BY AUTOMATED COUNT: 19 % (ref 11.5–14.5)
EST. GFR  (NO RACE VARIABLE): >60 ML/MIN/1.73 M^2
GLUCOSE SERPL-MCNC: 127 MG/DL (ref 70–110)
HCT VFR BLD AUTO: 40 % (ref 40–54)
HGB BLD-MCNC: 11.7 G/DL (ref 14–18)
IMM GRANULOCYTES # BLD AUTO: 0.02 K/UL (ref 0–0.04)
IMM GRANULOCYTES NFR BLD AUTO: 0.3 % (ref 0–0.5)
LYMPHOCYTES # BLD AUTO: 0.4 K/UL (ref 1–4.8)
LYMPHOCYTES NFR BLD: 5.7 % (ref 18–48)
MAGNESIUM SERPL-MCNC: 1.9 MG/DL (ref 1.6–2.6)
MCH RBC QN AUTO: 24.7 PG (ref 27–31)
MCHC RBC AUTO-ENTMCNC: 29.3 G/DL (ref 32–36)
MCV RBC AUTO: 84 FL (ref 82–98)
MONOCYTES # BLD AUTO: 0.1 K/UL (ref 0.3–1)
MONOCYTES NFR BLD: 1.1 % (ref 4–15)
NEUTROPHILS # BLD AUTO: 5.9 K/UL (ref 1.8–7.7)
NEUTROPHILS NFR BLD: 92.9 % (ref 38–73)
NRBC BLD-RTO: 0 /100 WBC
PHOSPHATE SERPL-MCNC: 2.8 MG/DL (ref 2.7–4.5)
PLATELET # BLD AUTO: 365 K/UL (ref 150–450)
PMV BLD AUTO: 9.4 FL (ref 9.2–12.9)
POTASSIUM SERPL-SCNC: 4.4 MMOL/L (ref 3.5–5.1)
RBC # BLD AUTO: 4.74 M/UL (ref 4.6–6.2)
SODIUM SERPL-SCNC: 138 MMOL/L (ref 136–145)
WBC # BLD AUTO: 6.32 K/UL (ref 3.9–12.7)

## 2023-01-09 PROCEDURE — 99233 PR SUBSEQUENT HOSPITAL CARE,LEVL III: ICD-10-PCS | Mod: ,,,

## 2023-01-09 PROCEDURE — 63600175 PHARM REV CODE 636 W HCPCS: Performed by: PHYSICIAN ASSISTANT

## 2023-01-09 PROCEDURE — 25000003 PHARM REV CODE 250: Performed by: EMERGENCY MEDICINE

## 2023-01-09 PROCEDURE — 84100 ASSAY OF PHOSPHORUS: CPT | Performed by: PHYSICIAN ASSISTANT

## 2023-01-09 PROCEDURE — 25000242 PHARM REV CODE 250 ALT 637 W/ HCPCS: Performed by: PHYSICIAN ASSISTANT

## 2023-01-09 PROCEDURE — G0378 HOSPITAL OBSERVATION PER HR: HCPCS

## 2023-01-09 PROCEDURE — 36415 COLL VENOUS BLD VENIPUNCTURE: CPT | Performed by: PHYSICIAN ASSISTANT

## 2023-01-09 PROCEDURE — 99900035 HC TECH TIME PER 15 MIN (STAT)

## 2023-01-09 PROCEDURE — 27000221 HC OXYGEN, UP TO 24 HOURS

## 2023-01-09 PROCEDURE — 94799 UNLISTED PULMONARY SVC/PX: CPT

## 2023-01-09 PROCEDURE — 25000242 PHARM REV CODE 250 ALT 637 W/ HCPCS

## 2023-01-09 PROCEDURE — 94660 CPAP INITIATION&MGMT: CPT

## 2023-01-09 PROCEDURE — 25000003 PHARM REV CODE 250

## 2023-01-09 PROCEDURE — 85025 COMPLETE CBC W/AUTO DIFF WBC: CPT | Performed by: PHYSICIAN ASSISTANT

## 2023-01-09 PROCEDURE — 94640 AIRWAY INHALATION TREATMENT: CPT | Mod: XB

## 2023-01-09 PROCEDURE — 99233 SBSQ HOSP IP/OBS HIGH 50: CPT | Mod: ,,,

## 2023-01-09 PROCEDURE — 80048 BASIC METABOLIC PNL TOTAL CA: CPT | Performed by: PHYSICIAN ASSISTANT

## 2023-01-09 PROCEDURE — 94761 N-INVAS EAR/PLS OXIMETRY MLT: CPT

## 2023-01-09 PROCEDURE — 83735 ASSAY OF MAGNESIUM: CPT | Performed by: PHYSICIAN ASSISTANT

## 2023-01-09 PROCEDURE — 25000003 PHARM REV CODE 250: Performed by: PHYSICIAN ASSISTANT

## 2023-01-09 RX ORDER — CALCIUM CARBONATE 200(500)MG
500 TABLET,CHEWABLE ORAL DAILY PRN
Status: DISCONTINUED | OUTPATIENT
Start: 2023-01-09 | End: 2023-01-10 | Stop reason: HOSPADM

## 2023-01-09 RX ORDER — IPRATROPIUM BROMIDE AND ALBUTEROL SULFATE 2.5; .5 MG/3ML; MG/3ML
3 SOLUTION RESPIRATORY (INHALATION)
Status: DISCONTINUED | OUTPATIENT
Start: 2023-01-09 | End: 2023-01-10 | Stop reason: HOSPADM

## 2023-01-09 RX ADMIN — GUAIFENESIN 600 MG: 600 TABLET, EXTENDED RELEASE ORAL at 08:01

## 2023-01-09 RX ADMIN — Medication 100 MG: at 09:01

## 2023-01-09 RX ADMIN — IPRATROPIUM BROMIDE AND ALBUTEROL SULFATE 3 ML: 2.5; .5 SOLUTION RESPIRATORY (INHALATION) at 04:01

## 2023-01-09 RX ADMIN — FERROUS SULFATE TAB 325 MG (65 MG ELEMENTAL FE) 1 EACH: 325 (65 FE) TAB at 09:01

## 2023-01-09 RX ADMIN — IPRATROPIUM BROMIDE AND ALBUTEROL SULFATE 3 ML: 2.5; .5 SOLUTION RESPIRATORY (INHALATION) at 08:01

## 2023-01-09 RX ADMIN — DILTIAZEM HYDROCHLORIDE 180 MG: 60 TABLET, FILM COATED ORAL at 09:01

## 2023-01-09 RX ADMIN — FUROSEMIDE 20 MG: 20 TABLET ORAL at 09:01

## 2023-01-09 RX ADMIN — SIMETHICONE 80 MG: 80 TABLET, CHEWABLE ORAL at 03:01

## 2023-01-09 RX ADMIN — PREDNISONE 40 MG: 20 TABLET ORAL at 09:01

## 2023-01-09 RX ADMIN — IPRATROPIUM BROMIDE AND ALBUTEROL SULFATE 3 ML: 2.5; .5 SOLUTION RESPIRATORY (INHALATION) at 11:01

## 2023-01-09 RX ADMIN — PANTOPRAZOLE SODIUM 40 MG: 40 TABLET, DELAYED RELEASE ORAL at 09:01

## 2023-01-09 RX ADMIN — FOLIC ACID 1 MG: 1 TABLET ORAL at 09:01

## 2023-01-09 RX ADMIN — ATORVASTATIN CALCIUM 40 MG: 40 TABLET, FILM COATED ORAL at 09:01

## 2023-01-09 RX ADMIN — IPRATROPIUM BROMIDE AND ALBUTEROL SULFATE 3 ML: .5; 3 SOLUTION RESPIRATORY (INHALATION) at 07:01

## 2023-01-09 RX ADMIN — APIXABAN 5 MG: 5 TABLET, FILM COATED ORAL at 09:01

## 2023-01-09 RX ADMIN — APIXABAN 5 MG: 5 TABLET, FILM COATED ORAL at 08:01

## 2023-01-09 RX ADMIN — GUAIFENESIN 600 MG: 600 TABLET, EXTENDED RELEASE ORAL at 09:01

## 2023-01-09 RX ADMIN — ALUMINUM HYDROXIDE, MAGNESIUM HYDROXIDE, AND SIMETHICONE 30 ML: 200; 200; 20 SUSPENSION ORAL at 09:01

## 2023-01-09 NOTE — ASSESSMENT & PLAN NOTE
- Counseled on smoking cessation, patient reports he will quit after this exacerbation.  - Discussed cessation program  - Patient refused at this time  - Will continue to provide education

## 2023-01-09 NOTE — SUBJECTIVE & OBJECTIVE
Interval History: MEDARDO DE LA GARZA. Evaluated patient at bedside. Reports sleeping well overnight with BiPAP. Was placed back on BiPAP this morning given O2 sats on NC. Patient was on 3 L NC during my evaluation. Reports continued SOB. Increased duonebs to q4h. Discussed importance for smoking cessation. Patient agrees, but is not interested in program. Will continue to provide education. Denies any other complaints at this time.    Review of Systems   Constitutional:  Negative for activity change, chills and fever.   HENT:  Negative for trouble swallowing.    Eyes:  Negative for photophobia and visual disturbance.   Respiratory:  Positive for cough (non-productive) and shortness of breath. Negative for chest tightness and wheezing.    Cardiovascular:  Negative for chest pain, palpitations and leg swelling.   Gastrointestinal:  Negative for abdominal pain, constipation, diarrhea, nausea and vomiting.   Genitourinary:  Negative for dysuria, frequency, hematuria and urgency.   Musculoskeletal:  Negative for arthralgias, back pain and gait problem.   Skin:  Negative for color change and rash.   Neurological:  Positive for dizziness and light-headedness. Negative for syncope, weakness, numbness and headaches.   Psychiatric/Behavioral:  Negative for agitation and confusion. The patient is not nervous/anxious.    Objective:     Vital Signs (Most Recent):  Temp: 98.6 °F (37 °C) (01/09/23 1138)  Pulse: 91 (01/09/23 1138)  Resp: 18 (01/09/23 1138)  BP: 126/69 (01/09/23 1138)  SpO2: (!) 94 % (01/09/23 1138)   Vital Signs (24h Range):  Temp:  [98 °F (36.7 °C)-98.6 °F (37 °C)] 98.6 °F (37 °C)  Pulse:  [] 91  Resp:  [17-50] 18  SpO2:  [86 %-100 %] 94 %  BP: (126-182)/() 126/69     Weight: 55.3 kg (121 lb 14.6 oz)  Body mass index is 19.68 kg/m².    Intake/Output Summary (Last 24 hours) at 1/9/2023 1418  Last data filed at 1/9/2023 0905  Gross per 24 hour   Intake 50 ml   Output 650 ml   Net -600 ml      Physical  Exam  Vitals and nursing note reviewed.   Constitutional:       General: He is in acute distress.      Appearance: He is well-developed.   HENT:      Head: Normocephalic and atraumatic.      Mouth/Throat:      Pharynx: No oropharyngeal exudate.   Eyes:      Conjunctiva/sclera: Conjunctivae normal.      Pupils: Pupils are equal, round, and reactive to light.   Cardiovascular:      Rate and Rhythm: Regular rhythm. Tachycardia present.      Heart sounds: Normal heart sounds.   Pulmonary:      Effort: Tachypnea and respiratory distress present.      Breath sounds: Wheezing present.   Abdominal:      General: Bowel sounds are normal. There is no distension.      Palpations: Abdomen is soft.      Tenderness: There is no abdominal tenderness.   Musculoskeletal:         General: No tenderness. Normal range of motion.      Cervical back: Normal range of motion and neck supple.      Right lower leg: No edema.      Left lower leg: No edema.   Lymphadenopathy:      Cervical: No cervical adenopathy.   Skin:     General: Skin is warm and dry.      Capillary Refill: Capillary refill takes less than 2 seconds.      Findings: No rash.   Neurological:      Mental Status: He is alert and oriented to person, place, and time.      Cranial Nerves: No cranial nerve deficit.      Sensory: No sensory deficit.      Coordination: Coordination normal.   Psychiatric:         Behavior: Behavior normal.         Thought Content: Thought content normal.         Judgment: Judgment normal.       Significant Labs: All pertinent labs within the past 24 hours have been reviewed.    Significant Imaging: I have reviewed all pertinent imaging results/findings within the past 24 hours.

## 2023-01-09 NOTE — ASSESSMENT & PLAN NOTE
Patient is identified as having Diastolic (HFpEF) heart failure that is Chronic. CHF is currently controlled. Latest ECHO performed and demonstrates  Results for orders placed during the hospital encounter of 11/18/22  Echo  Interpretation Summary  · The left ventricle is normal in size with normal systolic function.  · The estimated ejection fraction is 53%.  · There are segmental left ventricular wall motion abnormalities.  · Normal left ventricular diastolic function.  · There is abnormal septal wall motion.  · Normal right ventricular size with normal right ventricular systolic function.  · Normal central venous pressure (3 mmHg).  · There is a small right pleural effusion.  Continue Furosemide and monitor clinical status closely. Monitor on telemetry. Patient is off CHF pathway.  Monitor strict Is&Os and daily weights.  Place on fluid restriction of 1.5 L. Continue to stress to patient importance of self efficacy and  on diet for CHF. Last BNP reviewed- and noted below   Recent Labs   Lab 01/08/23  1333   *   - Cardiac diet  - Continue home Lasix 20 mg qD

## 2023-01-09 NOTE — PHARMACY MED REC
"            Admission Medication History     The home medication history was taken by Landry Waldron.    You may go to "Admission" then "Reconcile Home Medications" tabs to review and/or act upon these items.     The home medication list has been updated by the Pharmacy department.   Please read ALL comments highlighted in yellow.   Please address this information as you see fit.    Feel free to contact us if you have any questions or require assistance.      The medications listed below were removed from the home medication list. Please reorder if appropriate:  Patient reports no longer taking the following medication(s):  NICODERM CQ 7 MG PATCH  SPIRIVA INHALER      Current Outpatient Medications on File Prior to Encounter   Medication Sig    albuterol (PROVENTIL/VENTOLIN HFA) 90 mcg/actuation inhaler   Inhale 1-2 puffs into the lungs every 6 (six) hours as needed for Wheezing. Rescue    albuterol-ipratropium (DUO-NEB) 2.5 mg-0.5 mg/3 mL nebulizer solution   Use 1 vial (3 mLs) by nebulization every 4 (four) hours as needed for Wheezing or Shortness of Breath. Rescue    apixaban (ELIQUIS) 5 mg Tab     Take 1 tablet (5 mg total) by mouth 2 (two) times daily.    arformoteroL (BROVANA) 15 mcg/2 mL Nebu   Take 2 mLs (15 mcg total) by nebulization 2 (two) times daily. Controller    atorvastatin (LIPITOR) 40 MG tablet   Take 1 tablet (40 mg total) by mouth once daily.    bismuth subsalicylate (PEPTO BISMOL) 262 mg/15 mL suspension   Take 30 mLs by mouth daily as needed for Indigestion.    budesonide-glycopyr-formoterol (BREZTRI AEROSPHERE) 160-9-4.8 mcg/actuation HFAA   Inhale 2 puffs into the lungs 2 (two) times daily.    calcium carbonate (TUMS ORAL)   Take 2 tablets by mouth daily as needed (Heartburn).    diltiaZEM (CARDIZEM) 90 MG tablet   Take 2 tablets (180 mg total) by mouth once daily.    ferrous sulfate 325 (65 FE) MG EC tablet   Take 1 tablet (325 mg total) by mouth once daily.    folic acid (FOLVITE) 1 MG " tablet   Take 1 tablet (1 mg total) by mouth once daily.    multivit-min-FA-lycopen-lutein (CERTAVITE SENIOR) 0.4 mg-300 mcg- 250 mcg Tab   Take 1 tablet by mouth once daily.    pantoprazole (PROTONIX) 40 MG tablet   Take 1 tablet (40 mg total) by mouth once daily.    thiamine 100 MG tablet   Take 1 tablet (100 mg total) by mouth once daily.    furosemide (LASIX) 20 MG tablet   Take 1 tablet (20 mg total) by mouth once daily. (Patient not taking: Reported on 1/8/2023)    inhalat.spacing dev,large mask (COMPACT SPACE CHAMBER-LRG MASK) Spcr Use as directed         Potential issues to be addressed PRIOR TO DISCHARGE  Patient requires education regarding drug therapies     Landry Waldron  [id number]      .

## 2023-01-09 NOTE — NURSING
Patient on 02@2l/nc. Sats 88%. Patient is anxious. O2 placed on 3L/nc. Sats now 90%. Respiratory therapist called. Schedule neb tx given. Patient placed on Bipap. Sats now 97%. Resp even an unlabored. Will continue to monitor. Call light in reach.

## 2023-01-09 NOTE — NURSING
Pt AAOX4. No distress noted. 2L NC. Bed in lowest position. Side rails up x2. Call bell and personal belongs within reach. Safety precautions maintained. Pt free of falls or injuries. No further issues or concerns at this time. Plan of care continues.

## 2023-01-09 NOTE — PROGRESS NOTES
[facility] - Observation [address] [place] [address] Medicine  Progress Note    Patient Name: Baltazar Mccray  MRN: [id number]  Patient Class: OP- Observation   Admission Date: 1/8/2023  Length of Stay: 0 days  Attending Physician: Hien Andujar MD  Primary Care Provider: Delphine Orona MD        Subjective:     Principal Problem:COPD exacerbation        HPI:  Mr. Mccray is a 62-year-old male with a history of atrial fibrillation (on Eliquis), CHF, seizures, CAD, HTN, and COPD (on 2 L O2 at home) presenting to the ED with worsening shortness of breath x 3 days. Per EMS, was found to have O2 sat of 80% on RA at home. Patient reports SOB is worse with exertion and is associated with lightheadedness and dizziness. He has been using his home breathing treatments and BiPAP with minimal improvement. Patient reports compliance with home medications. However, reports smoking 2 cigarettes 3 days ago. He denies any fevers, chills, chest pain, orthopnea, sick contacts, recent infections, congestion, n/v/d, abdominal pain, dysuria, leg pain or swelling. Of note, patient was admitted to ICU last month for COPD exacerbation that was complicated by Afib with RVR requiring Dilt drip.    ED: AF, mildly hypertensive w/ tachypnea and tachycardia HR 90-110s, SpO2 maintained on non-rebreather. CBC w/o leukocytosis, anemia with H/H 11.8/39.7. CMP w/ K 3.4, Cl 92, Mg 1.5, HCO3 37, AG 13.. Trop 0.061. ECG showed NSR, nonischemic. CXR shows mildly coarse interstitial attenuation, similar to the previous exam. A bandlike atelectasis projected over the right midlung zone. No large focal consolidation or pneumothorax. Initially treated w/ non-rebreather then nasal cannula, Duonebs x3, IV Solumedrol. Mg replaced.      Overview/Hospital Course:  Baltazar Mccray was admitted to Hospital Medicine for a COPD exacerbation. Patient continuing to complain of SOB. Increased Duonebs from q6h to q4h. Will continue daily oral steroids and monitor improvement. Patient  frequently counseled on smoking cessation. Encourage smoking cessation program. Patient reports he's not interested. Will continue to educate on importance of cessation.      Interval History: [facility]ARD[facility]. Evaluated patient at bedside. Reports sleeping well overnight with BiPAP. Was placed back on BiPAP this morning given O2 sats on NC. Patient was on 3 L NC during my evaluation. Reports continued SOB. Increased duonebs to q4h. Discussed importance for smoking cessation. Patient agrees, but is not interested in program. Will continue to provide education. Denies any other complaints at this time.    Review of Systems   Constitutional:  Negative for activity change, chills and fever.   HENT:  Negative for trouble swallowing.    Eyes:  Negative for photophobia and visual disturbance.   Respiratory:  Positive for cough (non-productive) and shortness of breath. Negative for chest tightness and wheezing.    Cardiovascular:  Negative for chest pain, palpitations and leg swelling.   Gastrointestinal:  Negative for abdominal pain, constipation, diarrhea, nausea and vomiting.   Genitourinary:  Negative for dysuria, frequency, hematuria and urgency.   Musculoskeletal:  Negative for arthralgias, back pain and gait problem.   Skin:  Negative for color change and rash.   Neurological:  Positive for dizziness and light-headedness. Negative for syncope, weakness, numbness and headaches.   Psychiatric/Behavioral:  Negative for agitation and confusion. The patient is not nervous/anxious.    Objective:     Vital Signs (Most Recent):  Temp: 98.6 °F (37 °C) (01/09/23 1138)  Pulse: 91 (01/09/23 1138)  Resp: 18 (01/09/23 1138)  BP: 126/69 (01/09/23 1138)  SpO2: (!) 94 % (01/09/23 1138)   Vital Signs (24h Range):  Temp:  [98 °F (36.7 °C)-98.6 °F (37 °C)] 98.6 °F (37 °C)  Pulse:  [] 91  Resp:  [17-50] 18  SpO2:  [86 %-100 %] 94 %  BP: (126-182)/() 126/69     Weight: 55.3 kg (121 lb 14.6 oz)  Body mass index is 19.68  kg/m².    Intake/Output Summary (Last 24 hours) at 1/9/2023 1418  Last data filed at 1/9/2023 0905  Gross per 24 hour   Intake 50 ml   Output 650 ml   Net -600 ml      Physical Exam  Vitals and nursing note reviewed.   Constitutional:       General: He is in acute distress.      Appearance: He is well-developed.   HENT:      Head: Normocephalic and atraumatic.      Mouth/Throat:      Pharynx: No oropharyngeal exudate.   Eyes:      Conjunctiva/sclera: Conjunctivae normal.      Pupils: Pupils are equal, round, and reactive to light.   Cardiovascular:      Rate and Rhythm: Regular rhythm. Tachycardia present.      Heart sounds: Normal heart sounds.   Pulmonary:      Effort: Tachypnea and respiratory distress present.      Breath sounds: Wheezing present.   Abdominal:      General: Bowel sounds are normal. There is no distension.      Palpations: Abdomen is soft.      Tenderness: There is no abdominal tenderness.   Musculoskeletal:         General: No tenderness. Normal range of motion.      Cervical back: Normal range of motion and neck supple.      Right lower leg: No edema.      Left lower leg: No edema.   Lymphadenopathy:      Cervical: No cervical adenopathy.   Skin:     General: Skin is warm and dry.      Capillary Refill: Capillary refill takes less than 2 seconds.      Findings: No rash.   Neurological:      Mental Status: He is alert and oriented to person, place, and time.      Cranial Nerves: No cranial nerve deficit.      Sensory: No sensory deficit.      Coordination: Coordination normal.   Psychiatric:         Behavior: Behavior normal.         Thought Content: Thought content normal.         Judgment: Judgment normal.       Significant Labs: All pertinent labs within the past 24 hours have been reviewed.    Significant Imaging: I have reviewed all pertinent imaging results/findings within the past 24 hours.      Assessment/Plan:      * COPD exacerbation  Acute on chronic respiratory failure with hypoxia  and hypercapnia  Patient's COPD is with exacerbation noted by continued dyspnea and worsening of baseline hypoxia currently.  - Patient is currently on COPD Pathway.   - Continue scheduled inhalers Steroids and Supplemental oxygen and monitor respiratory status closely.  - Patient with Hypercapnic and Hypoxic Respiratory failure which is acute on chronic.   - He is on home oxygen at 2 LPM.  - Supplemental oxygen was provided and noted.  - Signs/symptoms of respiratory failure include tachypnea, increased work of breathing, respiratory distress and wheezing.  - Contributing diagnoses includes CHF and COPD  - Labs and images were reviewed. Patient has not had a recent ABG.   - Will treat underlying causes and adjust management of respiratory failure as follows  - 1/4 SIRS, HR>90  - SpO2 90% on 4 L NC  - HCO3 37, AG 13  - CXR shows mildly coarse interstitial attenuation, similar to the previous exam. A bandlike atelectasis projected over the right midlung zone. No large focal consolidation or pneumothorax.   - In ED, s/p Duonebs x3   - IV Solumedrol x1, start Prednisone 40 mg qD  - Duonebs advanced to q4h  - Will not start on abx at this time, continuing to monitor for need  - ABG reviewed  - BiPAP qhs  - Incentive spirometry  - Monitoring on tele  - Continue to monitor vitals    Hypomagnesemia  Resolved  - Mg 1.5, replaced  - monitor w/ BMP    Hypokalemia  Resolved  - K 3.4, replaced  - monitor w/ BMP    Anemia  - Hgb 11.8, stable  - No s/s acute bleeding  - Transfuse hgb<7  - Trend on daily CBC  - Continue Folate, Iron supplementation qD    Gastric ulcer  - Continue PPI    Atrial fibrillation  Patient with Paroxysmal (<7 days) atrial fibrillation which is controlled currently with Calcium Channel Blocker. Patient is currently in sinus rhythm.[id number] Score: 1. Anticoagulation indicated. Anticoagulation done with Eliquis.  - Continue Diltiazem 180 mg qD  - Continue Eliquis 5 mg BID    Alcohol use disorder, mild,  abuse  - Continue MV qD, Thiamine 100 mg qD  - CIWA    Nonobstructive atherosclerosis of coronary artery  - continue Lipitor 40 mg qD    Chronic diastolic congestive heart failure  Patient is identified as having Diastolic (HFpEF) heart failure that is Chronic. CHF is currently controlled. Latest ECHO performed and demonstrates  Results for orders placed during the hospital encounter of 11/18/22  Echo  Interpretation Summary  · The left ventricle is normal in size with normal systolic function.  · The estimated ejection fraction is 53%.  · There are segmental left ventricular wall motion abnormalities.  · Normal left ventricular diastolic function.  · There is abnormal septal wall motion.  · Normal right ventricular size with normal right ventricular systolic function.  · Normal central venous pressure (3 mmHg).  · There is a small right pleural effusion.  Continue Furosemide and monitor clinical status closely. Monitor on telemetry. Patient is off CHF pathway.  Monitor strict Is&Os and daily weights.  Place on fluid restriction of 1.5 L. Continue to stress to patient importance of self efficacy and  on diet for CHF. Last BNP reviewed- and noted below   Recent Labs   Lab 01/08/23  1333   *   - Cardiac diet  - Continue home Lasix 20 mg qD    Dependence on nicotine from cigarettes  - Counseled on smoking cessation, patient reports he will quit after this exacerbation.  - Discussed cessation program  - Patient refused at this time  - Will continue to provide education    Hx of seizure disorder  - Seizure precautions    Essential hypertension  - Continue home Diltiazem 180 mg qD, Lasix 20 mg qD      VTE Risk Mitigation (From admission, onward)         Ordered     apixaban tablet 5 mg  2 times daily         01/08/23 1614     Reason for No Pharmacological VTE Prophylaxis  Once        Question:  Reasons:  Answer:  Already adequately anticoagulated on oral Anticoagulants    01/08/23 1503     IP VTE HIGH RISK  PATIENT  Once         01/08/23 1503     Place sequential compression device  Until discontinued         01/08/23 1503                Discharge Planning   RUTH: 1/10/2023     Code Status: Full Code   Is the patient medically ready for discharge?: No    Reason for patient still in hospital (select all that apply): Patient trending condition and Treatment  Discharge Plan A: Home                  Delbert Garcia PA-C  Department of Hospital Medicine   [facility] - Observation 11H

## 2023-01-09 NOTE — HOSPITAL COURSE
Baltazar Mccray was admitted to Hospital Medicine for a COPD exacerbation. Patient continuing to complain of SOB. Increased Duonebs from q6h to q4h. Will continue daily oral steroids and monitor improvement. Patient frequently counseled on smoking cessation. Encouraged smoking cessation program. Patient reports he's not interested. Will continue to educate on importance of cessation. Due to productive cough, started on Azithromycin and Mucinex. Will provide at discharge. Discussed outpatient COPD management with patient's PCP. Will provide refill of Pulmicort and Brovana at discharge. Added Losartan for BP control. Patient improved quicker than expected, medically ready for discharge at this time with PCP follow up scheduled for tomorrow.

## 2023-01-09 NOTE — ASSESSMENT & PLAN NOTE
Acute on chronic respiratory failure with hypoxia and hypercapnia  Patient's COPD is with exacerbation noted by continued dyspnea and worsening of baseline hypoxia currently.  - Patient is currently on COPD Pathway.   - Continue scheduled inhalers Steroids and Supplemental oxygen and monitor respiratory status closely.  - Patient with Hypercapnic and Hypoxic Respiratory failure which is acute on chronic.   - He is on home oxygen at 2 LPM.  - Supplemental oxygen was provided and noted.  - Signs/symptoms of respiratory failure include tachypnea, increased work of breathing, respiratory distress and wheezing.  - Contributing diagnoses includes CHF and COPD  - Labs and images were reviewed. Patient has not had a recent ABG.   - Will treat underlying causes and adjust management of respiratory failure as follows  - 1/4 SIRS, HR>90  - SpO2 90% on 4 L NC  - HCO3 37, AG 13  - CXR shows mildly coarse interstitial attenuation, similar to the previous exam. A bandlike atelectasis projected over the right midlung zone. No large focal consolidation or pneumothorax.   - In ED, s/p Duonebs x3   - IV Solumedrol x1, start Prednisone 40 mg qD  - Duonebs advanced to q4h  - Will not start on abx at this time, continuing to monitor for need  - ABG reviewed  - BiPAP qhs  - Incentive spirometry  - Monitoring on tele  - Continue to monitor vitals

## 2023-01-09 NOTE — ASSESSMENT & PLAN NOTE
Patient with Paroxysmal (<7 days) atrial fibrillation which is controlled currently with Calcium Channel Blocker. Patient is currently in sinus rhythm.[id number] Score: 1. Anticoagulation indicated. Anticoagulation done with Eliquis.  - Continue Diltiazem 180 mg qD  - Continue Eliquis 5 mg BID

## 2023-01-10 ENCOUNTER — TELEPHONE (OUTPATIENT)
Dept: PRIMARY CARE CLINIC | Facility: CLINIC | Age: 63
End: 2023-01-10
Payer: MEDICARE

## 2023-01-10 VITALS
HEART RATE: 76 BPM | OXYGEN SATURATION: 95 % | TEMPERATURE: 98 F | SYSTOLIC BLOOD PRESSURE: 136 MMHG | RESPIRATION RATE: 16 BRPM | WEIGHT: 121.94 LBS | BODY MASS INDEX: 19.6 KG/M2 | DIASTOLIC BLOOD PRESSURE: 77 MMHG | HEIGHT: 66 IN

## 2023-01-10 LAB
ANION GAP SERPL CALC-SCNC: 11 MMOL/L (ref 8–16)
BASOPHILS # BLD AUTO: 0.01 K/UL (ref 0–0.2)
BASOPHILS NFR BLD: 0.1 % (ref 0–1.9)
BUN SERPL-MCNC: 17 MG/DL (ref 6–20)
CALCIUM SERPL-MCNC: 9.3 MG/DL (ref 8.7–10.5)
CHLORIDE SERPL-SCNC: 97 MMOL/L (ref 95–110)
CO2 SERPL-SCNC: 31 MMOL/L (ref 23–29)
CREAT SERPL-MCNC: 1 MG/DL (ref 0.5–1.4)
DIFFERENTIAL METHOD: ABNORMAL
EOSINOPHIL # BLD AUTO: 0 K/UL (ref 0–0.5)
EOSINOPHIL NFR BLD: 0 % (ref 0–8)
ERYTHROCYTE [DISTWIDTH] IN BLOOD BY AUTOMATED COUNT: 19.2 % (ref 11.5–14.5)
EST. GFR  (NO RACE VARIABLE): >60 ML/MIN/1.73 M^2
GLUCOSE SERPL-MCNC: 98 MG/DL (ref 70–110)
HCT VFR BLD AUTO: 39.8 % (ref 40–54)
HGB BLD-MCNC: 11.8 G/DL (ref 14–18)
IMM GRANULOCYTES # BLD AUTO: 0.06 K/UL (ref 0–0.04)
IMM GRANULOCYTES NFR BLD AUTO: 0.5 % (ref 0–0.5)
LYMPHOCYTES # BLD AUTO: 0.8 K/UL (ref 1–4.8)
LYMPHOCYTES NFR BLD: 6.8 % (ref 18–48)
MAGNESIUM SERPL-MCNC: 2 MG/DL (ref 1.6–2.6)
MCH RBC QN AUTO: 25 PG (ref 27–31)
MCHC RBC AUTO-ENTMCNC: 29.6 G/DL (ref 32–36)
MCV RBC AUTO: 84 FL (ref 82–98)
MONOCYTES # BLD AUTO: 0.9 K/UL (ref 0.3–1)
MONOCYTES NFR BLD: 7.8 % (ref 4–15)
NEUTROPHILS # BLD AUTO: 9.3 K/UL (ref 1.8–7.7)
NEUTROPHILS NFR BLD: 84.8 % (ref 38–73)
NRBC BLD-RTO: 0 /100 WBC
PHOSPHATE SERPL-MCNC: 3 MG/DL (ref 2.7–4.5)
PLATELET # BLD AUTO: 366 K/UL (ref 150–450)
PMV BLD AUTO: 9.3 FL (ref 9.2–12.9)
POTASSIUM SERPL-SCNC: 4.1 MMOL/L (ref 3.5–5.1)
RBC # BLD AUTO: 4.72 M/UL (ref 4.6–6.2)
SODIUM SERPL-SCNC: 139 MMOL/L (ref 136–145)
WBC # BLD AUTO: 11 K/UL (ref 3.9–12.7)

## 2023-01-10 PROCEDURE — 25000003 PHARM REV CODE 250: Performed by: EMERGENCY MEDICINE

## 2023-01-10 PROCEDURE — 84100 ASSAY OF PHOSPHORUS: CPT | Performed by: PHYSICIAN ASSISTANT

## 2023-01-10 PROCEDURE — 63600175 PHARM REV CODE 636 W HCPCS: Performed by: PHYSICIAN ASSISTANT

## 2023-01-10 PROCEDURE — 94640 AIRWAY INHALATION TREATMENT: CPT

## 2023-01-10 PROCEDURE — 63700000 PHARM REV CODE 250 ALT 637 W/O HCPCS

## 2023-01-10 PROCEDURE — 36415 COLL VENOUS BLD VENIPUNCTURE: CPT | Performed by: PHYSICIAN ASSISTANT

## 2023-01-10 PROCEDURE — 25000242 PHARM REV CODE 250 ALT 637 W/ HCPCS

## 2023-01-10 PROCEDURE — 1111F DSCHRG MED/CURRENT MED MERGE: CPT | Mod: CPTII,,,

## 2023-01-10 PROCEDURE — 11000001 HC ACUTE MED/SURG PRIVATE ROOM

## 2023-01-10 PROCEDURE — 83735 ASSAY OF MAGNESIUM: CPT | Performed by: PHYSICIAN ASSISTANT

## 2023-01-10 PROCEDURE — 99239 HOSP IP/OBS DSCHRG MGMT >30: CPT | Mod: ,,,

## 2023-01-10 PROCEDURE — 27000221 HC OXYGEN, UP TO 24 HOURS

## 2023-01-10 PROCEDURE — 1111F PR DISCHARGE MEDS RECONCILED W/ CURRENT OUTPATIENT MED LIST: ICD-10-PCS | Mod: CPTII,,,

## 2023-01-10 PROCEDURE — 94761 N-INVAS EAR/PLS OXIMETRY MLT: CPT

## 2023-01-10 PROCEDURE — 80048 BASIC METABOLIC PNL TOTAL CA: CPT | Performed by: PHYSICIAN ASSISTANT

## 2023-01-10 PROCEDURE — 94660 CPAP INITIATION&MGMT: CPT

## 2023-01-10 PROCEDURE — 99239 PR HOSPITAL DISCHARGE DAY,>30 MIN: ICD-10-PCS | Mod: ,,,

## 2023-01-10 PROCEDURE — 99900035 HC TECH TIME PER 15 MIN (STAT)

## 2023-01-10 PROCEDURE — 85025 COMPLETE CBC W/AUTO DIFF WBC: CPT | Performed by: PHYSICIAN ASSISTANT

## 2023-01-10 PROCEDURE — 25000003 PHARM REV CODE 250

## 2023-01-10 RX ORDER — AZITHROMYCIN 250 MG/1
250 TABLET, FILM COATED ORAL DAILY
Qty: 4 TABLET | Refills: 0 | Status: SHIPPED | OUTPATIENT
Start: 2023-01-11 | End: 2023-01-15

## 2023-01-10 RX ORDER — LOSARTAN POTASSIUM 25 MG/1
25 TABLET ORAL DAILY
Status: DISCONTINUED | OUTPATIENT
Start: 2023-01-10 | End: 2023-01-10 | Stop reason: HOSPADM

## 2023-01-10 RX ORDER — ARFORMOTEROL TARTRATE 15 UG/2ML
15 SOLUTION RESPIRATORY (INHALATION) 2 TIMES DAILY
Qty: 360 ML | Refills: 1 | Status: SHIPPED | OUTPATIENT
Start: 2023-01-10 | End: 2023-01-28

## 2023-01-10 RX ORDER — GUAIFENESIN 600 MG/1
600 TABLET, EXTENDED RELEASE ORAL 2 TIMES DAILY
Qty: 20 TABLET | Refills: 0 | Status: SHIPPED | OUTPATIENT
Start: 2023-01-10 | End: 2023-01-20

## 2023-01-10 RX ORDER — LOSARTAN POTASSIUM 25 MG/1
25 TABLET ORAL DAILY
Qty: 60 TABLET | Refills: 0 | Status: SHIPPED | OUTPATIENT
Start: 2023-01-10 | End: 2023-03-11

## 2023-01-10 RX ORDER — AZITHROMYCIN 250 MG/1
500 TABLET, FILM COATED ORAL ONCE
Status: COMPLETED | OUTPATIENT
Start: 2023-01-10 | End: 2023-01-10

## 2023-01-10 RX ORDER — PREDNISONE 20 MG/1
40 TABLET ORAL DAILY
Qty: 2 TABLET | Refills: 0 | Status: SHIPPED | OUTPATIENT
Start: 2023-01-11 | End: 2023-01-12

## 2023-01-10 RX ORDER — LOSARTAN POTASSIUM 25 MG/1
25 TABLET ORAL DAILY
Status: DISCONTINUED | OUTPATIENT
Start: 2023-01-10 | End: 2023-01-10

## 2023-01-10 RX ORDER — BUDESONIDE 0.5 MG/2ML
0.5 INHALANT ORAL DAILY
Qty: 60 ML | Refills: 1 | Status: SHIPPED | OUTPATIENT
Start: 2023-01-10 | End: 2023-01-25 | Stop reason: DRUGHIGH

## 2023-01-10 RX ADMIN — Medication 100 MG: at 10:01

## 2023-01-10 RX ADMIN — IPRATROPIUM BROMIDE AND ALBUTEROL SULFATE 3 ML: 2.5; .5 SOLUTION RESPIRATORY (INHALATION) at 04:01

## 2023-01-10 RX ADMIN — GUAIFENESIN 600 MG: 600 TABLET, EXTENDED RELEASE ORAL at 10:01

## 2023-01-10 RX ADMIN — LOSARTAN POTASSIUM 25 MG: 25 TABLET, FILM COATED ORAL at 04:01

## 2023-01-10 RX ADMIN — FUROSEMIDE 20 MG: 20 TABLET ORAL at 10:01

## 2023-01-10 RX ADMIN — FERROUS SULFATE TAB 325 MG (65 MG ELEMENTAL FE) 1 EACH: 325 (65 FE) TAB at 10:01

## 2023-01-10 RX ADMIN — IPRATROPIUM BROMIDE AND ALBUTEROL SULFATE 3 ML: 2.5; .5 SOLUTION RESPIRATORY (INHALATION) at 11:01

## 2023-01-10 RX ADMIN — DILTIAZEM HYDROCHLORIDE 180 MG: 60 TABLET, FILM COATED ORAL at 10:01

## 2023-01-10 RX ADMIN — ATORVASTATIN CALCIUM 40 MG: 40 TABLET, FILM COATED ORAL at 10:01

## 2023-01-10 RX ADMIN — APIXABAN 5 MG: 5 TABLET, FILM COATED ORAL at 10:01

## 2023-01-10 RX ADMIN — AZITHROMYCIN MONOHYDRATE 500 MG: 250 TABLET ORAL at 04:01

## 2023-01-10 RX ADMIN — IPRATROPIUM BROMIDE AND ALBUTEROL SULFATE 3 ML: 2.5; .5 SOLUTION RESPIRATORY (INHALATION) at 08:01

## 2023-01-10 RX ADMIN — PANTOPRAZOLE SODIUM 40 MG: 40 TABLET, DELAYED RELEASE ORAL at 10:01

## 2023-01-10 RX ADMIN — FOLIC ACID 1 MG: 1 TABLET ORAL at 10:01

## 2023-01-10 RX ADMIN — PREDNISONE 40 MG: 20 TABLET ORAL at 10:01

## 2023-01-10 NOTE — PLAN OF CARE
Problem: Adjustment to Illness COPD (Chronic Obstructive Pulmonary Disease)  Goal: Optimal Chronic Illness Coping  Outcome: Ongoing, Progressing     Problem: Functional Ability Impaired COPD (Chronic Obstructive Pulmonary Disease)  Goal: Optimal Level of Functional [place]  Outcome: Ongoing, Progressing     Problem: Infection COPD (Chronic Obstructive Pulmonary Disease)  Goal: Absence of Infection Signs and Symptoms  Outcome: Ongoing, Progressing     Problem: Oral Intake Inadequate COPD (Chronic Obstructive Pulmonary Disease)  Goal: Improved Nutrition Intake  Outcome: Ongoing, Progressing     Problem: Respiratory Compromise COPD (Chronic Obstructive Pulmonary Disease)  Goal: Effective Oxygenation and Ventilation  Outcome: Ongoing, Progressing     Problem: Adult Inpatient Plan of Care  Goal: Plan of Care Review  Outcome: Ongoing, Progressing  Goal: Patient-Specific Goal (Individualized)  Outcome: Ongoing, Progressing  Goal: Absence of Hospital-Acquired Illness or Injury  Outcome: Ongoing, Progressing  Goal: Optimal Comfort and Wellbeing  Outcome: Ongoing, Progressing  Goal: Readiness for Transition of Care  Outcome: Ongoing, Progressing     Problem: Infection  Goal: Absence of Infection Signs and Symptoms  Outcome: Ongoing, Progressing    Pt progressing toward goals. No distress noted. No falls or injuries during shift. Pt bed in lowest position. Side rails x2. Bed alarm activated. Call bell and personal belongs within reach.  Safety precautions maintained.

## 2023-01-10 NOTE — TELEPHONE ENCOUNTER
Post hospital visit scheduled for 1/11/2023.  Patient aware.  Patient discharging from hospital today, 1/11//2023

## 2023-01-10 NOTE — TELEPHONE ENCOUNTER
----- Message from Eliana Vieira sent at 1/10/2023  1:57 PM CST -----  Type:  Needs Medical Advice    Who Called: case management  Reason:schedule hosp f/u  Would the patient rather a call back or a response via [facility]? call  Best Call Back Number: [phone]  Additional Information: none

## 2023-01-10 NOTE — PLAN OF CARE
01/10/23 1113   Discharge Reassessment   Assessment Type Discharge Planning Reassessment   Did the patient's condition or plan change since previous assessment? No   Discharge Plan A Home with family   DME Needed Upon Discharge  none   Discharge Barriers Identified   (Await medical clearance)   Why the patient remains in the hospital Requires continued medical care   Post-Acute Status   Discharge Delays None known at this time

## 2023-01-11 ENCOUNTER — OFFICE VISIT (OUTPATIENT)
Dept: PRIMARY CARE CLINIC | Facility: CLINIC | Age: 63
End: 2023-01-11
Payer: MEDICARE

## 2023-01-11 VITALS
TEMPERATURE: 99 F | WEIGHT: 135.06 LBS | DIASTOLIC BLOOD PRESSURE: 72 MMHG | SYSTOLIC BLOOD PRESSURE: 132 MMHG | BODY MASS INDEX: 21.7 KG/M2 | HEART RATE: 68 BPM | HEIGHT: 66 IN | OXYGEN SATURATION: 95 %

## 2023-01-11 DIAGNOSIS — R64 PULMONARY CACHEXIA DUE TO CHRONIC OBSTRUCTIVE PULMONARY DISEASE: ICD-10-CM

## 2023-01-11 DIAGNOSIS — I48.19 PERSISTENT ATRIAL FIBRILLATION: ICD-10-CM

## 2023-01-11 DIAGNOSIS — Z86.69 HX OF SEIZURE DISORDER: Chronic | ICD-10-CM

## 2023-01-11 DIAGNOSIS — J41.0 SMOKERS' COUGH: ICD-10-CM

## 2023-01-11 DIAGNOSIS — F10.10 ALCOHOL USE DISORDER, MILD, ABUSE: Chronic | ICD-10-CM

## 2023-01-11 DIAGNOSIS — I70.0 AORTIC ATHEROSCLEROSIS: ICD-10-CM

## 2023-01-11 DIAGNOSIS — I50.30 HEART FAILURE WITH PRESERVED EJECTION FRACTION, UNSPECIFIED HF CHRONICITY: ICD-10-CM

## 2023-01-11 DIAGNOSIS — J44.9 PULMONARY CACHEXIA DUE TO CHRONIC OBSTRUCTIVE PULMONARY DISEASE: ICD-10-CM

## 2023-01-11 DIAGNOSIS — I25.118 CORONARY ARTERY DISEASE OF NATIVE ARTERY OF NATIVE HEART WITH STABLE ANGINA PECTORIS: ICD-10-CM

## 2023-01-11 DIAGNOSIS — I50.32 CHRONIC DIASTOLIC CONGESTIVE HEART FAILURE: ICD-10-CM

## 2023-01-11 PROBLEM — I25.10 NONOBSTRUCTIVE ATHEROSCLEROSIS OF CORONARY ARTERY: Chronic | Status: RESOLVED | Noted: 2020-04-23 | Resolved: 2023-01-11

## 2023-01-11 PROCEDURE — 99417 PR PROLONGED SVC, OUTPT, W/WO DIRECT PT CONTACT,  EA ADDTL 15 MIN: ICD-10-PCS | Mod: S$GLB,,, | Performed by: INTERNAL MEDICINE

## 2023-01-11 PROCEDURE — 3075F SYST BP GE 130 - 139MM HG: CPT | Mod: CPTII,S$GLB,, | Performed by: INTERNAL MEDICINE

## 2023-01-11 PROCEDURE — 3008F PR BODY MASS INDEX (BMI) DOCUMENTED: ICD-10-PCS | Mod: CPTII,S$GLB,, | Performed by: INTERNAL MEDICINE

## 2023-01-11 PROCEDURE — 3008F BODY MASS INDEX DOCD: CPT | Mod: CPTII,S$GLB,, | Performed by: INTERNAL MEDICINE

## 2023-01-11 PROCEDURE — 4010F ACE/ARB THERAPY RXD/TAKEN: CPT | Mod: CPTII,S$GLB,, | Performed by: INTERNAL MEDICINE

## 2023-01-11 PROCEDURE — 99215 OFFICE O/P EST HI 40 MIN: CPT | Mod: S$GLB,,, | Performed by: INTERNAL MEDICINE

## 2023-01-11 PROCEDURE — 4010F PR ACE/ARB THEARPY RXD/TAKEN: ICD-10-PCS | Mod: CPTII,S$GLB,, | Performed by: INTERNAL MEDICINE

## 2023-01-11 PROCEDURE — 3078F DIAST BP <80 MM HG: CPT | Mod: CPTII,S$GLB,, | Performed by: INTERNAL MEDICINE

## 2023-01-11 PROCEDURE — 99417 PROLNG OP E/M EACH 15 MIN: CPT | Mod: S$GLB,,, | Performed by: INTERNAL MEDICINE

## 2023-01-11 PROCEDURE — 3078F PR MOST RECENT DIASTOLIC BLOOD PRESSURE < 80 MM HG: ICD-10-PCS | Mod: CPTII,S$GLB,, | Performed by: INTERNAL MEDICINE

## 2023-01-11 PROCEDURE — 3075F PR MOST RECENT SYSTOLIC BLOOD PRESS GE 130-139MM HG: ICD-10-PCS | Mod: CPTII,S$GLB,, | Performed by: INTERNAL MEDICINE

## 2023-01-11 PROCEDURE — 99215 PR OFFICE/OUTPT VISIT, EST, LEVL V, 40-54 MIN: ICD-10-PCS | Mod: S$GLB,,, | Performed by: INTERNAL MEDICINE

## 2023-01-11 NOTE — PROGRESS NOTES
Primary Care Provider Appointment- [facility]    Subjective:      Patient ID: Baltazar Mccray is a 62 y.o. male with end-stage COPD and recent hospital admission    Chief Complaint: Follow-up    Patient was discharged from the hospital yesterday after COPD exacerbation. Has discharge paperwork with him but DC summary is not complete. He has many misunderstood instructions about medications from discharge.    Patient smoked cigarette 2 days prior to admission. Has stopped smoking since then.    He has new inhalers. Was prescribed brovana and pulmicort in his neb machine, in addition to his duonebs in neb machine. He is confused about whether to stop BREZTRI.    He did not bring all of his meds with him today and did not bring any of his inhalers, but agrees to return tomorrow.    Pill packs as follows (will pack for 2 days, then return tomorrow with all inhalers and pill packs and pill bottles)  Morning Pocket:  Diltiazam 180 mg (2 tablets)   Eliquis 5 mg   Furosemide 20 mg  Ferrous Sulfate 325mg   Pantoprazole 40 mg    Azthromycin 250mg daily ->ADD FOR 4 DAYS  Mucus relief-> ADD FOR 10 DAYS     Evening Pocket:  Atorvastatin 40 mg    Eliquis 5 mg    Folic Acid 1mg   MVI 50+   Thiamine 100 mg   Losartan 25mg -> ADD  Mucus relief-> ADD FOR 10 DAYS    Past Surgical History:   Procedure Laterality Date    ESOPHAGOGASTRODUODENOSCOPY N/A 2/11/2022    Procedure: EGD (ESOPHAGOGASTRODUODENOSCOPY);  Surgeon: Cain Ortega MD;  Location: [address]);  Service: Endoscopy;  Laterality: N/A;    ESOPHAGOGASTRODUODENOSCOPY N/A 9/15/2022    Procedure: EGD (ESOPHAGOGASTRODUODENOSCOPY);  Surgeon: Leonid Chavez MD;  Location: [address]);  Service: Endoscopy;  Laterality: N/A;  PA-50 - 2nd floor  vacc-wears 2L o2-inst mail and verbal-clears 4 hrs prior-tb  8/30 pt rescheduled; updated instructions mailed-st    LEFT HEART CATHETERIZATION  4/23/2020    Procedure: Left heart cath;  Surgeon: Nic Pollack MD;   Location: [facility] CATH LAB;  Service: Cardiology;;       Past Medical History:   Diagnosis Date    Aspiration pneumonia 5/30/2021    Asthma     Atrial fibrillation with rapid ventricular response     CHF (congestive heart failure)     COPD (chronic obstructive pulmonary disease)     home O2 at night only    Coronary artery disease     Hypertension     Lung nodule     Pneumonia     Seizures        Social History     Socioeconomic History    Marital status: Single   Tobacco Use    Smoking status: Some Days     Packs/day: 0.25     Types: Cigarettes    Smokeless tobacco: Never    Tobacco comments:     1-2 cigarettes per day   Substance and Sexual Activity    Alcohol use: Yes     Alcohol/week: 2.0 standard drinks     Types: 2 Cans of beer per week     Comment: every night    Drug use: No    Sexual activity: Not Currently   Social History Narrative    Patient' nephew is currently living with him in his apartment. Patient's sister Viry ([phone]) helps manage patient's care.            6/3/21    Patient is no longer staying with family. Patient is currently staying at the [facility] and working on getting into their program for more supportive housing.      Social Determinants of Health     Financial Resource Strain: Low Risk     Difficulty of Paying Living Expenses: Not hard at all   Food Insecurity: Unknown    Worried About Running Out of Food in the Last Year: Patient refused    Ran Out of Food in the Last Year: Patient refused   Transportation Needs: No Transportation Needs    Lack of Transportation (Medical): No    Lack of Transportation (Non-Medical): No   Physical Activity: Unknown    Days of Exercise per Week: Patient refused    Minutes of Exercise per Session: Patient refused   Stress: Stress Concern Present    Feeling of Stress : Rather much   Social Connections: Socially Isolated    Frequency of Communication with Friends and Family: More than three times a week    Frequency of Social Gatherings with Friends  "and Family: Twice a week    Attends [employer] Services: Never    Active Member of Clubs or Organizations: No    Attends Club or Organization Meetings: Patient refused    Marital Status: Never    Housing Stability: Low Risk     Unable to Pay for Housing in the Last Year: No    Number of Places Lived in the Last Year: 2    Unstable Housing in the Last Year: No       Review of Systems   Constitutional:  Positive for fatigue. Negative for activity change.   Respiratory:  Positive for shortness of breath.    Hematological:  Bruises/bleeds easily.     Objective:   /72 (BP Location: Left arm, Patient Position: Sitting, BP Method: Medium (Manual))   Pulse 68   Temp 98.5 °F (36.9 °C) (Oral)   Ht 5' 6" (1.676 m)   Wt 61.3 kg (135 lb 0.5 oz)   SpO2 95%   BMI 21.79 kg/m²     Physical Exam  Constitutional:       Appearance: He is ill-appearing.      Comments: Frail appearing   Pulmonary:      Effort: Pulmonary effort is normal.      Breath sounds: Wheezing present.   Neurological:      Motor: Weakness present.      Gait: Gait abnormal.       Lab Results   Component Value Date    WBC 11.00 01/10/2023    HGB 11.8 (L) 01/10/2023    HCT 39.8 (L) 01/10/2023     01/10/2023    CHOL 260 (H) 04/19/2020    TRIG 145 04/19/2020    HDL 91 (H) 04/19/2020    ALT 14 01/08/2023    AST 20 01/08/2023     01/10/2023    K 4.1 01/10/2023    CL 97 01/10/2023    CREATININE 1.0 01/10/2023    BUN 17 01/10/2023    CO2 31 (H) 01/10/2023    TSH 1.137 12/05/2022    INR 1.1 12/08/2022    HGBA1C 5.7 (H) 02/10/2022       RESULTS: Reviewed labs and images today    Assessment:   62 y.o. male with multiple co-morbid illnesses here to continue work-up of chronic issues notably    Plan:     Problem List Items Addressed This Visit          Neuro    Hx of seizure disorder (Chronic)     May have been related to alcohol use  monitor            Psychiatric    Alcohol use disorder, mild, abuse (Chronic)     Folic acid, thiamine and MVI in " "pill packs  Continue current regimen  Advised to avoid tobacco and alcohol            Pulmonary    Pulmonary cachexia due to chronic obstructive pulmonary disease     Hypoglycemia, hypoalbuminemia, low muscle mass, difficulty ambulating (slow gait speed), fatigue. BMI 19-20, weight variable due to collection method discrepancies. Numerous chronic conditions causing this multifactoral decline: COPD, h/o alcohol abuse, hypermetabolism with work of breath  Poor prognosis  High mortality associated with this phenomenan  Monitor weights and treat COPD  Strongly advised to avoid smoking  Stopped recently (1 week ago)         Smokers' cough     Tobacco use, contemplative for quitting (is in the process of the quitting)  Advised to quit            Cardiac/Vascular    Chronic diastolic congestive heart failure     Low EF of 53%, on lasix chronically  Monitor  Lasix in pill packs           Aortic atherosclerosis     Seen on chest CT 2/2018: "There are calcifications along the course of the thoracic aorta."  Continue statin, anticoagulation, BP control  Advised to avoid smoking         (HFpEF) heart failure with preserved ejection fraction     Low EF of 53%, on lasix chronically  Monitor  Lasix in pill packs         Persistent atrial fibrillation     COPD on 2L, Atrial Fibrillation, HFpEF (EF 53%) with AF RVR on admission with rates up to 170bpm.   On eliquis  Diltiazem  Treat COPD         Coronary artery disease of native artery of native heart with stable angina pectoris     Underwent LHC in 2020 via R Radial artery without complications. Intermittently elevated Troponin and EKG changes likely 2/2 to demand-ischemia in setting of acute on chronic respiratory failure with hypoxia. Seen on chest CT 2018: "There are scattered coronary artery calcifications." Medically managed with lasix, statin, ARB, BP control  Continue current meds            Health Maintenance         Date Due Completion Date    TETANUS VACCINE Never done " ---    Colorectal Cancer Screening Never done ---    Shingles Vaccine (1 of 2) Never done ---    Hemoglobin A1c (Prediabetes) 02/10/2023 2/10/2022    High Dose Statin 12/28/2023 12/28/2022    Lipid Panel 04/19/2025 4/19/2020    Pneumococcal Vaccines (Age 0-64) (3 - PPSV23 if available, else PCV20) 12/19/2027 12/19/2022            Follow up in about 1 day (around 1/12/2023). 60min spent with patient today, issues addressed include: COPD, tobacco use, hospital DC    Delphine Orona MD/MPH  Internal Medicine  [facility]  [phone]

## 2023-01-11 NOTE — PLAN OF CARE
Problem: Adjustment to Illness COPD (Chronic Obstructive Pulmonary Disease)  Goal: Optimal Chronic Illness Coping  Outcome: Met     Problem: Functional Ability Impaired COPD (Chronic Obstructive Pulmonary Disease)  Goal: Optimal Level of Functional Nash  Outcome: Met     Problem: Infection COPD (Chronic Obstructive Pulmonary Disease)  Goal: Absence of Infection Signs and Symptoms  Outcome: Met     Problem: Oral Intake Inadequate COPD (Chronic Obstructive Pulmonary Disease)  Goal: Improved Nutrition Intake  Outcome: Met     Problem: Respiratory Compromise COPD (Chronic Obstructive Pulmonary Disease)  Goal: Effective Oxygenation and Ventilation  Outcome: Met     Problem: Adult Inpatient Plan of Care  Goal: Plan of Care Review  Outcome: Met  Goal: Patient-Specific Goal (Individualized)  Outcome: Met  Goal: Absence of Hospital-Acquired Illness or Injury  Outcome: Met  Goal: Optimal Comfort and Wellbeing  Outcome: Met  Goal: Readiness for Transition of Care  Outcome: Met     Problem: Infection  Goal: Absence of Infection Signs and Symptoms  Outcome: Met

## 2023-01-12 ENCOUNTER — OFFICE VISIT (OUTPATIENT)
Dept: PRIMARY CARE CLINIC | Facility: CLINIC | Age: 63
End: 2023-01-12
Payer: MEDICARE

## 2023-01-12 VITALS
OXYGEN SATURATION: 97 % | SYSTOLIC BLOOD PRESSURE: 109 MMHG | TEMPERATURE: 98 F | DIASTOLIC BLOOD PRESSURE: 60 MMHG | HEIGHT: 66 IN | BODY MASS INDEX: 22.11 KG/M2 | WEIGHT: 137.56 LBS | HEART RATE: 84 BPM

## 2023-01-12 DIAGNOSIS — Z79.899 POLYPHARMACY: ICD-10-CM

## 2023-01-12 DIAGNOSIS — J44.1 COPD EXACERBATION: ICD-10-CM

## 2023-01-12 DIAGNOSIS — Z60.9 POOR SOCIAL SITUATION: ICD-10-CM

## 2023-01-12 PROCEDURE — 3078F DIAST BP <80 MM HG: CPT | Mod: CPTII,S$GLB,, | Performed by: INTERNAL MEDICINE

## 2023-01-12 PROCEDURE — 3074F PR MOST RECENT SYSTOLIC BLOOD PRESSURE < 130 MM HG: ICD-10-PCS | Mod: CPTII,S$GLB,, | Performed by: INTERNAL MEDICINE

## 2023-01-12 PROCEDURE — 1111F DSCHRG MED/CURRENT MED MERGE: CPT | Mod: CPTII,S$GLB,, | Performed by: INTERNAL MEDICINE

## 2023-01-12 PROCEDURE — 3008F BODY MASS INDEX DOCD: CPT | Mod: CPTII,S$GLB,, | Performed by: INTERNAL MEDICINE

## 2023-01-12 PROCEDURE — 4010F ACE/ARB THERAPY RXD/TAKEN: CPT | Mod: CPTII,S$GLB,, | Performed by: INTERNAL MEDICINE

## 2023-01-12 PROCEDURE — 99213 OFFICE O/P EST LOW 20 MIN: CPT | Mod: S$GLB,,, | Performed by: INTERNAL MEDICINE

## 2023-01-12 PROCEDURE — 3078F PR MOST RECENT DIASTOLIC BLOOD PRESSURE < 80 MM HG: ICD-10-PCS | Mod: CPTII,S$GLB,, | Performed by: INTERNAL MEDICINE

## 2023-01-12 PROCEDURE — 4010F PR ACE/ARB THEARPY RXD/TAKEN: ICD-10-PCS | Mod: CPTII,S$GLB,, | Performed by: INTERNAL MEDICINE

## 2023-01-12 PROCEDURE — 1111F PR DISCHARGE MEDS RECONCILED W/ CURRENT OUTPATIENT MED LIST: ICD-10-PCS | Mod: CPTII,S$GLB,, | Performed by: INTERNAL MEDICINE

## 2023-01-12 PROCEDURE — 99213 PR OFFICE/OUTPT VISIT, EST, LEVL III, 20-29 MIN: ICD-10-PCS | Mod: S$GLB,,, | Performed by: INTERNAL MEDICINE

## 2023-01-12 PROCEDURE — 1159F PR MEDICATION LIST DOCUMENTED IN MEDICAL RECORD: ICD-10-PCS | Mod: CPTII,S$GLB,, | Performed by: INTERNAL MEDICINE

## 2023-01-12 PROCEDURE — 1159F MED LIST DOCD IN RCRD: CPT | Mod: CPTII,S$GLB,, | Performed by: INTERNAL MEDICINE

## 2023-01-12 PROCEDURE — 3008F PR BODY MASS INDEX (BMI) DOCUMENTED: ICD-10-PCS | Mod: CPTII,S$GLB,, | Performed by: INTERNAL MEDICINE

## 2023-01-12 PROCEDURE — 3074F SYST BP LT 130 MM HG: CPT | Mod: CPTII,S$GLB,, | Performed by: INTERNAL MEDICINE

## 2023-01-12 RX ORDER — BUDESONIDE, GLYCOPYRROLATE, AND FORMOTEROL FUMARATE 160; 9; 4.8 UG/1; UG/1; UG/1
1 AEROSOL, METERED RESPIRATORY (INHALATION) 2 TIMES DAILY
Qty: 10.7 G | Refills: 3 | Status: SHIPPED | OUTPATIENT
Start: 2023-01-12 | End: 2023-01-25

## 2023-01-12 SDOH — SOCIAL DETERMINANTS OF HEALTH (SDOH): PROBLEM RELATED TO SOCIAL ENVIRONMENT, UNSPECIFIED: Z60.9

## 2023-01-12 NOTE — ASSESSMENT & PLAN NOTE
"Underwent LHC in 2020 via R Radial artery without complications. Intermittently elevated Troponin and EKG changes likely 2/2 to demand-ischemia in setting of acute on chronic respiratory failure with hypoxia. Seen on chest CT 2018: "There are scattered coronary artery calcifications." Medically managed with lasix, statin, ARB, BP control  · Continue current meds  "

## 2023-01-12 NOTE — ASSESSMENT & PLAN NOTE
COPD on 2L, Atrial Fibrillation, HFpEF (EF 53%) with AF RVR on admission with rates up to 170bpm.   · On eliquis  · Diltiazem  · Treat COPD

## 2023-01-12 NOTE — PATIENT INSTRUCTIONS
Still need to go through inhalers:  - needs to use spacer with albuterol  - needs to use BREZKI until the brovana comes in  - will use brovana and pulmicort once the PA for Brovana is approved

## 2023-01-12 NOTE — ASSESSMENT & PLAN NOTE
Folic acid, thiamine and MVI in pill packs  · Continue current regimen  · Advised to avoid tobacco and alcohol

## 2023-01-12 NOTE — ASSESSMENT & PLAN NOTE
"Seen on chest CT 2/2018: "There are calcifications along the course of the thoracic aorta."  · Continue statin, anticoagulation, BP control  · Advised to avoid smoking  "

## 2023-01-12 NOTE — PROGRESS NOTES
Adherence re-packed using Dispill for 28 days:  (Compressed monthly)     Morning Pocket:  Azithromycin 250 mg (4 days only remained)  Diltiazam 180 mg (2 tablets)   Eliquis 5 mg   Furosemide 20 mg  Ferrous Sulfate 325mg   Mucus relief (9 days only remained)  Pantoprazole 40 mg       Evening Pocket:  Atorvastatin 40 mg    Eliquis 5 mg    Folic Acid 1mg   Losartan 25 mg - newly added  Mucus relief (9 days remained)  MVI 50+   Thiamine 100 mg

## 2023-01-12 NOTE — ASSESSMENT & PLAN NOTE
Hypoglycemia, hypoalbuminemia, low muscle mass, difficulty ambulating (slow gait speed), fatigue. BMI 19-20, weight variable due to collection method discrepancies. Numerous chronic conditions causing this multifactoral decline: COPD, h/o alcohol abuse, hypermetabolism with work of breath  · Poor prognosis  · High mortality associated with this phenomenan  · Monitor weights and treat COPD  · Strongly advised to avoid smoking  · Stopped recently (1 week ago)

## 2023-01-12 NOTE — ASSESSMENT & PLAN NOTE
Patient came in again for follow-up. He only brought albuterol hand-held inhaler (no spacer), pulmicort neb solution (30 doses), duonebs (80 doses).  · using duoneb PRN  · Using pulmicort BID  THINGS NEEDED TO DO:  - needs to use spacer with albuterol  - needs to use BREZKI until the brovana comes in  - will use brovana and pulmicort once the PA for Brovana is approved

## 2023-01-12 NOTE — PROGRESS NOTES
Primary Care Provider Appointment - [facility]  SHARED NOTE: Shadi Quezada (MD Trainee), Dr Orona (Attending)    Subjective:      Patient ID: Baltazar Mccray is a 62 y.o. male with End Stage COPD and recent hospital admission      Chief Complaint: Follow-up    Prior to this visit, patient's last encounter with PCP was 1/11/2023.    Patient D'c ed from hospital on 1/10/2023. Presented to clinic yesterday for post hosp med rec and repacking of his pills. He Did not bring his pill packs yesterday so returned today. He brought his pulmicort and duo-nebs solutions and has 80 doses of duo-nebs and 30 doses of pulmicort. His albuterol inhaler needs to be refilled.    BP low today in clinic 109/60 but it appears that Losartan 25 mg was added to the morning dose of 2 days days worth of packs provided yesterday instead of the evening pack. Pharm confirms that they will be adding the losartan to the evening pack for his dispill. Packs as follows:    Morning Pocket:  Diltiazam 180 mg (2 tablets)   Eliquis 5 mg   Furosemide 20 mg  Ferrous Sulfate 325mg   Pantoprazole 40 mg    Azthromycin 250mg daily ->ADD FOR 4 DAYS  Mucus relief-> ADD FOR 10 DAYS     Evening Pocket:  Atorvastatin 40 mg    Eliquis 5 mg    Folic Acid 1mg   MVI 50+   Thiamine 100 mg   Losartan 25mg -> ADD  Mucus relief-> ADD FOR 10 DAYS     4Ms for Medical Decision-Making in Older Adults    Last Completed EAWV: None    MOBILITY:  Get Up and Go:  No flowsheet data found.  Activities of Daily Living:  No flowsheet data found.  Whisper Test:  No flowsheet data found.  Disability Status:  Disability Status 10/3/2018   Are you deaf or do you have serious difficulty hearing? No   Are you blind or do you have serious difficulty seeing, even when wearing glasses? No   Because of a physical, mental, or emotional condition, do you have serious difficulty concentrating, remembering, or making decisions? No   Do you have serious difficulty walking or climbing stairs? No    Do you have difficulty dressing or bathing? No   Because of a physical, mental, or emotional condition, do you have difficulty doing errands alone such as visiting a doctor's office or shopping? No     Nutrition Screening:  No flowsheet data found. Screening Score: 0-7 Malnourished, 8-11 At Risk, 12-14 Normal    MENTATION:   Depression Patient Health Questionnaire:  Depression Patient Health Questionnaire 1/11/2023   Over the last two weeks how often have you been bothered by little interest or pleasure in doing things Not at all   Over the last two weeks how often have you been bothered by feeling down, depressed or hopeless Not at all   PHQ-2 Total Score 0   PHQ-4 Total Score 0     Has Dementia Dx: No    Cognitive Function Screening:  No flowsheet data found.  Cognitive Function Screening Total - Less than 4 = Abnormal,  Greater than or equal to 4 = Normal    MEDICATIONS:  High Risk Medications:  Total Active Medications: 0  This patient does not have an active medication from one of the medication groupers.    WHAT MATTERS MOST:  Advance Care Planning   ACP Status:   Patient has had an ACP conversation  Living Will: No  Power of : No  [place]: No                       Social History     Socioeconomic History    Marital status: Single   Tobacco Use    Smoking status: Some Days     Packs/day: 0.25     Types: Cigarettes    Smokeless tobacco: Never    Tobacco comments:     1-2 cigarettes per day   Substance and Sexual Activity    Alcohol use: Yes     Alcohol/week: 2.0 standard drinks     Types: 2 Cans of beer per week     Comment: every night    Drug use: No    Sexual activity: Not Currently   Social History Narrative    Patient' nephew is currently living with him in his apartment. Patient's sister Viry ([phone]) helps manage patient's care.            6/3/21    Patient is no longer staying with family. Patient is currently staying at the [facility] and working on getting into their program for more  "supportive housing.      Social Determinants of Health     Financial Resource Strain: Low Risk     Difficulty of Paying Living Expenses: Not hard at all   Food Insecurity: Unknown    Worried About Running Out of Food in the Last Year: Patient refused    Ran Out of Food in the Last Year: Patient refused   Transportation Needs: No Transportation Needs    Lack of Transportation (Medical): No    Lack of Transportation (Non-Medical): No   Physical Activity: Unknown    Days of Exercise per Week: Patient refused    Minutes of Exercise per Session: Patient refused   Stress: Stress Concern Present    Feeling of Stress : Rather much   Social Connections: Socially Isolated    Frequency of Communication with Friends and Family: More than three times a week    Frequency of Social Gatherings with Friends and Family: Twice a week    Attends Rastafari Services: Never    Active Member of Clubs or Organizations: No    Attends Club or Organization Meetings: Patient refused    Marital Status: Never    Housing Stability: Low Risk     Unable to Pay for Housing in the Last Year: No    Number of Places Lived in the Last Year: 2    Unstable Housing in the Last Year: No       Review of Systems   Constitutional:  Positive for fatigue. Negative for activity change and appetite change.   Respiratory:  Positive for shortness of breath.    Hematological:  Bruises/bleeds easily.   Psychiatric/Behavioral:  The patient is nervous/anxious.      Objective:   /60 (BP Location: Left arm, Patient Position: Sitting, BP Method: Medium (Manual))   Pulse 84   Temp 98 °F (36.7 °C) (Oral)   Ht 5' 6" (1.676 m)   Wt 62.4 kg (137 lb 9.1 oz)   SpO2 97%   BMI 22.20 kg/m²     Physical Exam  Constitutional:       Comments: Ambulating independently   Pulmonary:      Effort: Respiratory distress present.      Breath sounds: Wheezing present.      Comments: Increased effort of breath  Wearing NC O2  Skin:     Findings: Bruising present. "   Neurological:      Mental Status: He is alert.   Psychiatric:      Comments: anxious           Lab Results   Component Value Date    WBC 11.00 01/10/2023    HGB 11.8 (L) 01/10/2023    HCT 39.8 (L) 01/10/2023     01/10/2023    CHOL 260 (H) 04/19/2020    TRIG 145 04/19/2020    HDL 91 (H) 04/19/2020    ALT 14 01/08/2023    AST 20 01/08/2023     01/10/2023    K 4.1 01/10/2023    CL 97 01/10/2023    CREATININE 1.0 01/10/2023    BUN 17 01/10/2023    CO2 31 (H) 01/10/2023    TSH 1.137 12/05/2022    INR 1.1 12/08/2022    HGBA1C 5.7 (H) 02/10/2022       Current Outpatient Medications on File Prior to Visit   Medication Sig Dispense Refill    albuterol (PROVENTIL/VENTOLIN HFA) 90 mcg/actuation inhaler Inhale 1-2 puffs into the lungs every 6 (six) hours as needed for Wheezing. Rescue 8.5 g 2    albuterol-ipratropium (DUO-NEB) 2.5 mg-0.5 mg/3 mL nebulizer solution Use 1 vial (3 mLs) by nebulization every 4 (four) hours as needed for Wheezing or Shortness of Breath. Rescue 180 mL 2    apixaban (ELIQUIS) 5 mg Tab Take 1 tablet (5 mg total) by mouth 2 (two) times daily. 180 tablet 3    arformoteroL (BROVANA) 15 mcg/2 mL Nebu Take 2 mLs (15 mcg total) by nebulization 2 (two) times daily. Controller 360 mL 1    atorvastatin (LIPITOR) 40 MG tablet Take 1 tablet (40 mg total) by mouth once daily. 90 tablet 3    azithromycin (Z-DIEGO) 250 MG tablet Take 1 tablet (250 mg total) by mouth once daily. 4 tablet 0    bismuth subsalicylate (PEPTO BISMOL) 262 mg/15 mL suspension Take 30 mLs by mouth daily as needed for Indigestion.      budesonide (PULMICORT) 0.5 mg/2 mL nebulizer solution Take 2 mLs (0.5 mg total) by nebulization once daily. Controller 60 mL 1    calcium carbonate (TUMS ORAL) Take 2 tablets by mouth daily as needed (Heartburn).      diltiaZEM (CARDIZEM) 90 MG tablet Take 2 tablets (180 mg total) by mouth once daily. 180 tablet 3    ferrous sulfate 325 (65 FE) MG EC tablet Take 1 tablet (325 mg total) by mouth  once daily. 30 tablet 1    folic acid (FOLVITE) 1 MG tablet Take 1 tablet (1 mg total) by mouth once daily. 30 tablet 2    furosemide (LASIX) 20 MG tablet Take 1 tablet (20 mg total) by mouth once daily. 90 tablet 3    guaiFENesin (MUCINEX) 600 mg 12 hr tablet Take 1 tablet (600 mg total) by mouth 2 (two) times daily. for 10 days 20 tablet 0    inhalat.spacing dev,large mask (COMPACT SPACE CHAMBER-LRG MASK) Spcr Use as directed 1 each 0    losartan (COZAAR) 25 MG tablet Take 1 tablet (25 mg total) by mouth once daily. 60 tablet 0    multivit-min-FA-lycopen-lutein (CERTAVITE SENIOR) 0.4 mg-300 mcg- 250 mcg Tab Take 1 tablet by mouth once daily. 30 tablet 2    pantoprazole (PROTONIX) 40 MG tablet Take 1 tablet (40 mg total) by mouth once daily. 30 tablet 11    [] predniSONE (DELTASONE) 20 MG tablet Take 2 tablets (40 mg total) by mouth once daily. for 1 day 2 tablet 0    thiamine 100 MG tablet Take 1 tablet (100 mg total) by mouth once daily. 30 tablet 2    [DISCONTINUED] tiotropium bromide (SPIRIVA RESPIMAT) 2.5 mcg/actuation inhaler Inhale 2 puffs into the lungs Daily. Controller 4 g 5     No current facility-administered medications on file prior to visit.         Assessment:   62 y.o. male with multiple co-morbid illnesses here to follow-up with PCP and continue work-up of chronic issues:    Plan:     Problem List Items Addressed This Visit          Pulmonary    COPD exacerbation     Patient came in again for follow-up. He only brought albuterol hand-held inhaler (no spacer), pulmicort neb solution (30 doses), duonebs (80 doses).  using duoneb PRN  Using pulmicort BID  THINGS NEEDED TO DO:  - needs to use spacer with albuterol  - needs to use BREZKI until the brovana comes in  - will use brovana and pulmicort once the PA for Brovana is approved         Relevant Medications    budesonide-glycopyr-formoterol (BREZTRI AEROSPHERE) 160-9-4.8 mcg/actuation HFAA       Palliative Care    Polypharmacy     Complex  medication regimen, high hospital utilization, uncontrolled chronic issues  Pill packs updated today            Other    Poor social situation     Inadequate social support in the home, and low health literacy  Weekly appointments   Extensive education            Health Maintenance         Date Due Completion Date    TETANUS VACCINE Never done ---    Colorectal Cancer Screening Never done ---    Shingles Vaccine (1 of 2) Never done ---    Hemoglobin A1c (Prediabetes) 02/10/2023 2/10/2022    High Dose Statin 12/28/2023 12/28/2022    Lipid Panel 04/19/2025 4/19/2020    Pneumococcal Vaccines (Age 0-64) (3 - PPSV23 if available, else PCV20) 12/19/2027 12/19/2022            Future Appointments   Date Time Provider Department Center   1/18/2023 12:30 PM Delphine Orona MD [facility] MED DEVEN [address] PCW   1/31/2023  9:00 AM NURSE, [facility] CLINIC [facility] MED CLN [address] PCW   1/31/2023  9:30 AM Carlo Pascal MD [facility] PULMSVC [address] [facility]   2/13/2023  8:00 AM Delphine Orona MD [facility] MED DEVEN [place] [facility]         Follow up in about 1 week (around 1/19/2023). Total clinical care time was 60 min, issues addressed include.      Delphine Orona MD/MPH  [facility]  [phone] [facility]

## 2023-01-13 ENCOUNTER — TELEPHONE (OUTPATIENT)
Dept: PRIMARY CARE CLINIC | Facility: CLINIC | Age: 63
End: 2023-01-13
Payer: MEDICARE

## 2023-01-13 NOTE — TELEPHONE ENCOUNTER
Outgoing call to inform patient that he needs to sign two PAP application for Brovana nebulizer and Pulmicort nebulizer. Patient voice understanding and was also able to provide his house hold size and annual income.

## 2023-01-13 NOTE — ASSESSMENT & PLAN NOTE
Inadequate social support in the home, and low health literacy  · Weekly appointments   · Extensive education

## 2023-01-13 NOTE — ASSESSMENT & PLAN NOTE
Complex medication regimen, high hospital utilization, uncontrolled chronic issues  · Pill packs updated today

## 2023-01-18 ENCOUNTER — OFFICE VISIT (OUTPATIENT)
Dept: PRIMARY CARE CLINIC | Facility: CLINIC | Age: 63
End: 2023-01-18
Payer: MEDICARE

## 2023-01-18 VITALS
DIASTOLIC BLOOD PRESSURE: 56 MMHG | TEMPERATURE: 98 F | HEART RATE: 86 BPM | BODY MASS INDEX: 21.93 KG/M2 | HEIGHT: 66 IN | SYSTOLIC BLOOD PRESSURE: 107 MMHG | WEIGHT: 136.44 LBS | OXYGEN SATURATION: 92 %

## 2023-01-18 DIAGNOSIS — D69.2 SENILE PURPURA: ICD-10-CM

## 2023-01-18 DIAGNOSIS — F17.210 CIGARETTE NICOTINE DEPENDENCE WITHOUT COMPLICATION: ICD-10-CM

## 2023-01-18 DIAGNOSIS — G47.33 OSA (OBSTRUCTIVE SLEEP APNEA): Primary | ICD-10-CM

## 2023-01-18 DIAGNOSIS — J44.9 PULMONARY CACHEXIA DUE TO CHRONIC OBSTRUCTIVE PULMONARY DISEASE: ICD-10-CM

## 2023-01-18 DIAGNOSIS — I10 ESSENTIAL HYPERTENSION: Chronic | ICD-10-CM

## 2023-01-18 DIAGNOSIS — F17.219 CIGARETTE NICOTINE DEPENDENCE WITH NICOTINE-INDUCED DISORDER: ICD-10-CM

## 2023-01-18 DIAGNOSIS — R64 PULMONARY CACHEXIA DUE TO CHRONIC OBSTRUCTIVE PULMONARY DISEASE: ICD-10-CM

## 2023-01-18 DIAGNOSIS — J41.0 SMOKERS' COUGH: ICD-10-CM

## 2023-01-18 PROCEDURE — 1159F MED LIST DOCD IN RCRD: CPT | Mod: CPTII,S$GLB,, | Performed by: INTERNAL MEDICINE

## 2023-01-18 PROCEDURE — 3078F DIAST BP <80 MM HG: CPT | Mod: CPTII,S$GLB,, | Performed by: INTERNAL MEDICINE

## 2023-01-18 PROCEDURE — 3078F PR MOST RECENT DIASTOLIC BLOOD PRESSURE < 80 MM HG: ICD-10-PCS | Mod: CPTII,S$GLB,, | Performed by: INTERNAL MEDICINE

## 2023-01-18 PROCEDURE — 1111F PR DISCHARGE MEDS RECONCILED W/ CURRENT OUTPATIENT MED LIST: ICD-10-PCS | Mod: CPTII,S$GLB,, | Performed by: INTERNAL MEDICINE

## 2023-01-18 PROCEDURE — 3008F BODY MASS INDEX DOCD: CPT | Mod: CPTII,S$GLB,, | Performed by: INTERNAL MEDICINE

## 2023-01-18 PROCEDURE — 3074F SYST BP LT 130 MM HG: CPT | Mod: CPTII,S$GLB,, | Performed by: INTERNAL MEDICINE

## 2023-01-18 PROCEDURE — 1111F DSCHRG MED/CURRENT MED MERGE: CPT | Mod: CPTII,S$GLB,, | Performed by: INTERNAL MEDICINE

## 2023-01-18 PROCEDURE — 99213 PR OFFICE/OUTPT VISIT, EST, LEVL III, 20-29 MIN: ICD-10-PCS | Mod: S$GLB,,, | Performed by: INTERNAL MEDICINE

## 2023-01-18 PROCEDURE — 1159F PR MEDICATION LIST DOCUMENTED IN MEDICAL RECORD: ICD-10-PCS | Mod: CPTII,S$GLB,, | Performed by: INTERNAL MEDICINE

## 2023-01-18 PROCEDURE — 99213 OFFICE O/P EST LOW 20 MIN: CPT | Mod: S$GLB,,, | Performed by: INTERNAL MEDICINE

## 2023-01-18 PROCEDURE — 4010F ACE/ARB THERAPY RXD/TAKEN: CPT | Mod: CPTII,S$GLB,, | Performed by: INTERNAL MEDICINE

## 2023-01-18 PROCEDURE — 3074F PR MOST RECENT SYSTOLIC BLOOD PRESSURE < 130 MM HG: ICD-10-PCS | Mod: CPTII,S$GLB,, | Performed by: INTERNAL MEDICINE

## 2023-01-18 PROCEDURE — 4010F PR ACE/ARB THEARPY RXD/TAKEN: ICD-10-PCS | Mod: CPTII,S$GLB,, | Performed by: INTERNAL MEDICINE

## 2023-01-18 PROCEDURE — 3008F PR BODY MASS INDEX (BMI) DOCUMENTED: ICD-10-PCS | Mod: CPTII,S$GLB,, | Performed by: INTERNAL MEDICINE

## 2023-01-18 RX ORDER — IBUPROFEN 200 MG
1 TABLET ORAL DAILY
Qty: 28 PATCH | Refills: 11 | Status: SHIPPED | OUTPATIENT
Start: 2023-01-18 | End: 2023-01-28

## 2023-01-18 NOTE — PATIENT INSTRUCTIONS
TODAY:  - NP to come to your home  - we will follow-up with inhalers  - smoking cessation program to call you  - stop smoking!!  - will review pill packs and inhalers on 1/26  - BRING ALL OF YOUR MEDS  - speak with Pulm about your CPAP supplies, we also sent a message only  - shingrix #1  - continue [place]  - use a spacer with your albuterol

## 2023-01-18 NOTE — PROGRESS NOTES
"Primary Care Provider Appointment - [facility]  SHARED NOTE: Isidra Mejias (MS-4), Dr Orona (Attending)    Subjective:      Patient ID: Baltazar Mccray is a 62 y.o. male with end-stage COPD, HTN, HLD, polypharmacy, poor social situation     Chief Complaint: Follow-up and COPD    Prior to this visit, patient's last encounter with PCP was 1/12/2023.    Mr Mccray's main problem today is getting new CPAP supplies, including mask. He states that for the last week, the mask has stopped fitting well and prevents him from getting quality sleep. He did not bring in all his medications (pill pack) nor spacer/supplies.     Mr Mccray reports decreasing his smoking levels to only "2 cigarettes per week."     Patient also reports that he prefers the Teva  for his albuterol. Aware of using brezki for his daily. Is unaware that he has neb medicines at home.     DM  - Currently taking: none  - BGL today: none  - Last A1c on 5.7 was 2/10/22  - Diet consists of "eating what I want to eat... lemon and eggs"    HTN  - Currently taking: losartan 25mg, diltiazam 180mg, furosemide 20mg  - How often taking BP at home: 120/60 - 140  - Today, BP is: 107/56, 110/55 (10:38am, manual), 98/49 (10:44am, patient's machine)    HLD  - Currently taking: atorvastatin 40mg  - Last lipid panel: 4/19/20   - Total Cholesterol: 260   - Triglycerides: 145   - HDL: 91    Asthma/COPD  - Currently taking: bretzki and albuterol, reports using a spacer  - states that only the red albuterol works for him, and not the blue   Unclear whether he is using the spacer consistently with his handheld inhaler  - Smoking status: 2 cigarettes/week  - On 2 L of O2 at home  - patient with lots of confusion over his neb treatments and which meds he has at home  - needs inhaler refills from pharmacy and education on which ones to use      Mental Health  - Social support system consists of brother, lives in apartment alone with cat Betsy  - Hobbies include cook " "(loves to cook "seth hamilton")    Specialty notes (if they see heme/onc, GI, ortho, ophth, derm, etc...)   - refuses specialist care  - Current treatment plan for this problem is managed with us    Care Gaps:  - tetanus vaccine - not recommended currently  - do NOT recommend colonoscopy for colorectal screening  - given shingles vaccination at appointemnt      Medications:  Adherence re-packed using Dispill for 28 days:  (Compressed monthly)     Morning Pocket:  Azithromycin 250 mg (4 days only remained)  Diltiazam 180 mg (2 tablets)   Eliquis 5 mg   Furosemide 20 mg  Ferrous Sulfate 325mg   Mucus relief (9 days only remained)  Pantoprazole 40 mg       Evening Pocket:  Atorvastatin 40 mg    Eliquis 5 mg    Folic Acid 1mg   Losartan 25 mg - newly added  Mucus relief (9 days remained)  MVI 50+   Thiamine 100 mg       FU in 1 wk to specifically check all medications & supplies      4Ms for Medical Decision-Making in Older Adults    Last Completed EAWV: None    MOBILITY:  Get Up and Go:  No flowsheet data found.  Activities of Daily Living:  No flowsheet data found.  Whisper Test:  No flowsheet data found.  Disability Status:  Disability Status 10/3/2018   Are you deaf or do you have serious difficulty hearing? No   Are you blind or do you have serious difficulty seeing, even when wearing glasses? No   Because of a physical, mental, or emotional condition, do you have serious difficulty concentrating, remembering, or making decisions? No   Do you have serious difficulty walking or climbing stairs? No   Do you have difficulty dressing or bathing? No   Because of a physical, mental, or emotional condition, do you have difficulty doing errands alone such as visiting a doctor's office or shopping? No     Nutrition Screening:  No flowsheet data found. Screening Score: 0-7 Malnourished, 8-11 At Risk, 12-14 Normal    MENTATION:   Depression Patient Health Questionnaire:  Depression Patient Health Questionnaire 1/11/2023 "   Over the last two weeks how often have you been bothered by little interest or pleasure in doing things Not at all   Over the last two weeks how often have you been bothered by feeling down, depressed or hopeless Not at all   PHQ-2 Total Score 0   PHQ-4 Total Score 0     Has Dementia Dx: No    Cognitive Function Screening:  No flowsheet data found.  Cognitive Function Screening Total - Less than 4 = Abnormal,  Greater than or equal to 4 = Normal    MEDICATIONS:  High Risk Medications:  Total Active Medications: 0  This patient does not have an active medication from one of the medication groupers.    WHAT MATTERS MOST:  Advance Care Planning   ACP Status:   Patient has had an ACP conversation  Living Will: No  Power of : No  LaPOST: No    What is most important right now is to focus on avoiding the hospital    Accordingly, we have decided that the best plan to meet the patient's goals includes continuing with treatment                   Social History     Socioeconomic History    Marital status: Single   Tobacco Use    Smoking status: Some Days     Packs/day: 0.25     Types: Cigarettes    Smokeless tobacco: Never    Tobacco comments:     1-2 cigarettes per day   Substance and Sexual Activity    Alcohol use: Yes     Alcohol/week: 2.0 standard drinks     Types: 2 Cans of beer per week     Comment: every night    Drug use: No    Sexual activity: Not Currently   Social History Narrative    Patient' nephew is currently living with him in his apartment. Patient's sister Viry ([phone]) helps manage patient's care.            6/3/21    Patient is no longer staying with family. Patient is currently staying at the [facility] and working on getting into their program for more supportive housing.      Social Determinants of Health     Financial Resource Strain: Low Risk     Difficulty of Paying Living Expenses: Not hard at all   Food Insecurity: Unknown    Worried About Running Out of Food in the Last Year:  Patient refused    Ran Out of Food in the Last Year: Patient refused   Transportation Needs: No Transportation Needs    Lack of Transportation (Medical): No    Lack of Transportation (Non-Medical): No   Physical Activity: Unknown    Days of Exercise per Week: Patient refused    Minutes of Exercise per Session: Patient refused   Stress: Stress Concern Present    Feeling of Stress : Rather much   Social Connections: Socially Isolated    Frequency of Communication with Friends and Family: More than three times a week    Frequency of Social Gatherings with Friends and Family: Twice a week    Attends Holiness Services: Never    Active Member of Clubs or Organizations: No    Attends Club or Organization Meetings: Patient refused    Marital Status: Never    Housing Stability: Low Risk     Unable to Pay for Housing in the Last Year: No    Number of Places Lived in the Last Year: 2    Unstable Housing in the Last Year: No       Review of Systems   Constitutional:  Positive for appetite change and unexpected weight change. Negative for fatigue and fever.   HENT:  Negative for sinus pressure and sinus pain.    Respiratory:  Positive for cough, choking, chest tightness, shortness of breath and wheezing.         4-6hrs breathing treatment; triggered by posture (laying back trigger)  Green cough, no hemoptysis   Cardiovascular:  Negative for chest pain, palpitations and leg swelling.   Gastrointestinal:  Negative for abdominal distention, abdominal pain, anal bleeding, blood in stool, constipation, diarrhea and nausea.   Endocrine: Negative for cold intolerance and heat intolerance.   Genitourinary:  Negative for decreased urine volume, difficulty urinating, dysuria, frequency, hematuria and urgency.   Skin:  Negative for pallor and rash.   Neurological:  Positive for dizziness and light-headedness. Negative for facial asymmetry, speech difficulty, numbness and headaches.   Hematological:  Does not bruise/bleed easily.  "  Psychiatric/Behavioral:  Positive for sleep disturbance. Negative for dysphoric mood. The patient is not nervous/anxious.      Objective:   BP (!) 107/56 (BP Location: Right arm, Patient Position: Sitting, BP Method: Small (Automatic))   Pulse 86   Temp 98 °F (36.7 °C) (Oral)   Ht 5' 6" (1.676 m)   Wt 61.9 kg (136 lb 7.4 oz)   SpO2 (!) 92%   BMI 22.03 kg/m²     Physical Exam  Vitals reviewed.   Constitutional:       General: He is not in acute distress.     Appearance: Normal appearance. He is normal weight. He is ill-appearing. He is not toxic-appearing.      Comments: Cachectic appearance   HENT:      Head: Normocephalic and atraumatic.      Mouth/Throat:      Mouth: Mucous membranes are moist.      Pharynx: Oropharynx is clear.   Cardiovascular:      Rate and Rhythm: Normal rate and regular rhythm.      Pulses: Normal pulses.      Heart sounds: Normal heart sounds.   Pulmonary:      Effort: No respiratory distress.      Breath sounds: No stridor. Wheezing present.      Comments: On NC O2  Abdominal:      General: Abdomen is flat. There is no distension.      Palpations: Abdomen is soft.      Tenderness: There is no abdominal tenderness.   Musculoskeletal:      Comments: Low muscle mass   Skin:     General: Skin is warm and dry.      Capillary Refill: Capillary refill takes less than 2 seconds.      Coloration: Skin is not jaundiced.      Findings: Bruising present.   Neurological:      General: No focal deficit present.      Mental Status: He is alert and oriented to person, place, and time. Mental status is at baseline.   Psychiatric:         Mood and Affect: Mood normal.         Behavior: Behavior normal.         Thought Content: Thought content normal.      Comments: anxious           Lab Results   Component Value Date    WBC 11.00 01/10/2023    HGB 11.8 (L) 01/10/2023    HCT 39.8 (L) 01/10/2023     01/10/2023    CHOL 260 (H) 04/19/2020    TRIG 145 04/19/2020    HDL 91 (H) 04/19/2020    ALT 14 " 01/08/2023    AST 20 01/08/2023     01/10/2023    K 4.1 01/10/2023    CL 97 01/10/2023    CREATININE 1.0 01/10/2023    BUN 17 01/10/2023    CO2 31 (H) 01/10/2023    TSH 1.137 12/05/2022    INR 1.1 12/08/2022    HGBA1C 5.7 (H) 02/10/2022       Current Outpatient Medications on File Prior to Visit   Medication Sig Dispense Refill    albuterol (PROVENTIL/VENTOLIN HFA) 90 mcg/actuation inhaler Inhale 1-2 puffs into the lungs every 6 (six) hours as needed for Wheezing. Rescue 8.5 g 2    albuterol-ipratropium (DUO-NEB) 2.5 mg-0.5 mg/3 mL nebulizer solution Use 1 vial (3 mLs) by nebulization every 4 (four) hours as needed for Wheezing or Shortness of Breath. Rescue 180 mL 2    apixaban (ELIQUIS) 5 mg Tab Take 1 tablet (5 mg total) by mouth 2 (two) times daily. 180 tablet 3    arformoteroL (BROVANA) 15 mcg/2 mL Nebu Take 2 mLs (15 mcg total) by nebulization 2 (two) times daily. Controller 360 mL 1    atorvastatin (LIPITOR) 40 MG tablet Take 1 tablet (40 mg total) by mouth once daily. 90 tablet 3    bismuth subsalicylate (PEPTO BISMOL) 262 mg/15 mL suspension Take 30 mLs by mouth daily as needed for Indigestion.      budesonide (PULMICORT) 0.5 mg/2 mL nebulizer solution Take 2 mLs (0.5 mg total) by nebulization once daily. Controller 60 mL 1    budesonide-glycopyr-formoterol (BREZTRI AEROSPHERE) 160-9-4.8 mcg/actuation HFAA Inhale 1 application into the lungs 2 (two) times daily. 10.7 g 3    calcium carbonate (TUMS ORAL) Take 2 tablets by mouth daily as needed (Heartburn).      diltiaZEM (CARDIZEM) 90 MG tablet Take 2 tablets (180 mg total) by mouth once daily. 180 tablet 3    ferrous sulfate 325 (65 FE) MG EC tablet Take 1 tablet (325 mg total) by mouth once daily. 30 tablet 1    folic acid (FOLVITE) 1 MG tablet Take 1 tablet (1 mg total) by mouth once daily. 30 tablet 2    furosemide (LASIX) 20 MG tablet Take 1 tablet (20 mg total) by mouth once daily. 90 tablet 3    guaiFENesin (MUCINEX) 600 mg 12 hr tablet Take 1  "tablet (600 mg total) by mouth 2 (two) times daily. for 10 days 20 tablet 0    inhalat.spacing dev,large mask (COMPACT SPACE CHAMBER-LRG MASK) Spcr Use as directed 1 each 0    losartan (COZAAR) 25 MG tablet Take 1 tablet (25 mg total) by mouth once daily. 60 tablet 0    multivit-min-FA-lycopen-lutein (CERTAVITE SENIOR) 0.4 mg-300 mcg- 250 mcg Tab Take 1 tablet by mouth once daily. 30 tablet 2    pantoprazole (PROTONIX) 40 MG tablet Take 1 tablet (40 mg total) by mouth once daily. 30 tablet 11    thiamine 100 MG tablet Take 1 tablet (100 mg total) by mouth once daily. 30 tablet 2    [DISCONTINUED] tiotropium bromide (SPIRIVA RESPIMAT) 2.5 mcg/actuation inhaler Inhale 2 puffs into the lungs Daily. Controller 4 g 5     No current facility-administered medications on file prior to visit.         Assessment:   62 y.o. male with multiple co-morbid illnesses here to follow-up with PCP and continue work-up of chronic issues:    Plan:     Problem List Items Addressed This Visit          Pulmonary    Pulmonary cachexia due to chronic obstructive pulmonary disease     Hypoglycemia, hypoalbuminemia, low muscle mass, difficulty ambulating (slow gait speed), fatigue. BMI 19-20, weight variable due to collection method discrepancies. Numerous chronic conditions causing this multifactoral decline: COPD, h/o alcohol abuse, hypermetabolism with work of breath  Poor prognosis  High mortality associated with this phenomenan  Monitor weights and treat COPD  Strongly advised to avoid smoking  Previously reported stopping recently (1 week ago)  Today states he smokes "2 cigarettes per week"         Relevant Orders    Ambulatory referral/consult to [facility]    Smokers' cough    Relevant Medications    nicotine (NICODERM CQ) 21 mg/24 hr    Other Relevant Orders    Ambulatory referral/consult to Smoking Cessation Program       Cardiac/Vascular    Essential hypertension (Chronic)     BP overtreated with the " addition of losartan.   Stressed the importance of him bringing ALL his medications and supplies to the next meeting  Low BP worrisome of hypotension   - Discontinue losartan at next PCP visit (when he brings his pill pack)            Hematology    Senile purpura     Ecchymoses and purpura on UE bilaterally. On eliquis  monitor            Other    Dependence on nicotine from cigarettes     Continues to smoke (2 cigarettes/wk)  Counseled on smoking cessation & enrolled in a smoking cessation program   Scheduled for an intake appointment next week (1/26) with an NP to quality for free nicotine patches  Cost of nicotine patches around $40 and patient can not afford them         Relevant Orders    Ambulatory referral/consult to Smoking Cessation Program    Cigarette nicotine dependence without complication    Relevant Medications    nicotine (NICODERM CQ) 21 mg/24 hr    Other Relevant Orders    Ambulatory referral/consult to Smoking Cessation Program    LIBRA (obstructive sleep apnea) - Primary     Requires CPAP mask & supplies. Mr Mccray and I called the [facility] during his appointment and they stated that he requires a specialist appointment in order for him to get his mask supplies refilled.  Scheduled Pulmology Appointment for supply prescription refill            Health Maintenance         Date Due Completion Date    TETANUS VACCINE Never done ---    Colorectal Cancer Screening Never done --- TOO HIGH RISK    Shingles Vaccine (1 of 2) Never done ---TODAY    Hemoglobin A1c (Prediabetes) 02/10/2023 2/10/2022    High Dose Statin 01/12/2024 1/12/2023    Lipid Panel 04/19/2025 4/19/2020    Pneumococcal Vaccines (Age 0-64) (3 - PPSV23 if available, else PCV20) 12/19/2027 12/19/2022            Future Appointments   Date Time Provider Department Center   1/26/2023  9:00 AM NURSE, [facility] MED CLN Sherif Valdovinos PCW   1/26/2023 10:00 [facility] Sherif Valdovinos   1/31/2023  9:00 AM NURSE,  [facility] MED CLDEVEN Gorman Hwgordo PCW   1/31/2023  9:30 AM Carlo Pascal MD [facility] PULMSVC Sherif Valdovinos   2/20/2023 10:00 AM Delphine Orona MD [facility] MED CLDEVEN Valdovinos PCW         Follow up in about 4 weeks (around 2/15/2023). Total clinical care time was 60 min, issues addressed include: COPD, cachexia, high hospital utilization    Isidra Mejias, MS-4    Delphine Orona MD/MPH  [facility]  [phone] [employer]

## 2023-01-18 NOTE — Clinical Note
Can you assist with ordering CPAP supplies for this patient? We spoke with DME and they have to come from Pulm or sleep medicine  Thanks! KJ

## 2023-01-19 NOTE — DISCHARGE SUMMARY
[facility] - Observation [address] [place] [address] Medicine  Discharge Summary      Patient Name: Baltazar Mccray  MRN: [id number]  THEODORE: [id number]  Patient Class: IP- Inpatient  Admission Date: 1/8/2023  Hospital Length of Stay: 1 days  Discharge Date and Time: 1/10/2023  6:38 PM  Attending Physician: Carina att. providers found   Discharging Provider: Delbert Garcia PA-C  Primary Care Provider: Delphine Orona MD  [facility] Medicine Team: [facility] HOSP MED E Delbert Garcia PA-C  Primary Care Team: [facility] HOSP MED E    HPI:   Mr. Mccray is a 62-year-old male with a history of atrial fibrillation (on Eliquis), CHF, seizures, CAD, HTN, and COPD (on 2 L O2 at home) presenting to the ED with worsening shortness of breath x 3 days. Per EMS, was found to have O2 sat of 80% on RA at home. Patient reports SOB is worse with exertion and is associated with lightheadedness and dizziness. He has been using his home breathing treatments and BiPAP with minimal improvement. Patient reports compliance with home medications. However, reports smoking 2 cigarettes 3 days ago. He denies any fevers, chills, chest pain, orthopnea, sick contacts, recent infections, congestion, n/v/d, abdominal pain, dysuria, leg pain or swelling. Of note, patient was admitted to ICU last month for COPD exacerbation that was complicated by Afib with RVR requiring Dilt drip.    ED: AF, mildly hypertensive w/ tachypnea and tachycardia HR 90-110s, SpO2 maintained on non-rebreather. CBC w/o leukocytosis, anemia with H/H 11.8/39.7. CMP w/ K 3.4, Cl 92, Mg 1.5, HCO3 37, AG 13.. Trop 0.061. ECG showed NSR, nonischemic. CXR shows mildly coarse interstitial attenuation, similar to the previous exam. A bandlike atelectasis projected over the right midlung zone. No large focal consolidation or pneumothorax. Initially treated w/ non-rebreather then nasal cannula, Duonebs x3, IV Solumedrol. Mg replaced.      * No surgery found *      Hospital Course:   Baltazar Mccray was admitted to  Hospital Medicine for a COPD exacerbation. Patient continuing to complain of SOB. Increased Duonebs from q6h to q4h. Will continue daily oral steroids and monitor improvement. Patient frequently counseled on smoking cessation. Encouraged smoking cessation program. Patient reports he's not interested. Will continue to educate on importance of cessation. Due to productive cough, started on Azithromycin and Mucinex. Will provide at discharge. Discussed outpatient COPD management with patient's PCP. Will provide refill of Pulmicort and Brovana at discharge. Added Losartan for BP control. Patient improved quicker than expected, medically ready for discharge at this time with PCP follow up scheduled for tomorrow.       Goals of Care Treatment Preferences:  Code Status: Full Code          What is most important right now is to focus on avoiding the hospital.  Accordingly, we have decided that the best plan to meet the patient's goals includes continuing with treatment.      Consults:     * COPD exacerbation  Acute on chronic respiratory failure with hypoxia and hypercapnia  Patient's COPD is with exacerbation noted by continued dyspnea and worsening of baseline hypoxia currently.  - Patient is currently on COPD Pathway.   - Continue scheduled inhalers Steroids and Supplemental oxygen and monitor respiratory status closely.  - Patient with Hypercapnic and Hypoxic Respiratory failure which is acute on chronic.   - He is on home oxygen at 2 LPM.  - Supplemental oxygen was provided and noted.  - Signs/symptoms of respiratory failure include tachypnea, increased work of breathing, respiratory distress and wheezing.  - Contributing diagnoses includes CHF and COPD  - Labs and images were reviewed. Patient has not had a recent ABG.   - Will treat underlying causes and adjust management of respiratory failure as follows  - 1/4 SIRS, HR>90  - SpO2 90% on 4 L NC  - HCO3 37, AG 13  - CXR shows mildly coarse interstitial attenuation,  similar to the previous exam. A bandlike atelectasis projected over the right midlung zone. No large focal consolidation or pneumothorax.   - In ED, s/p Duonebs x3   - IV Solumedrol x1, start Prednisone 40 mg qD  - Duonebs advanced to q4h  - Started on Azithromycin  - ABG reviewed  - BiPAP qhs  - Incentive spirometry  - Monitoring on tele  - Continue to monitor vitals    Hypomagnesemia  Resolved  - Mg 1.5, replaced  - monitor w/ BMP    Hypokalemia  Resolved  - K 3.4, replaced  - monitor w/ BMP    Anemia  - Hgb 11.8, stable  - No s/s acute bleeding  - Transfuse hgb<7  - Trend on daily CBC  - Continue Folate, Iron supplementation qD    Gastric ulcer  - Continue PPI    Atrial fibrillation  Patient with Paroxysmal (<7 days) atrial fibrillation which is controlled currently with Calcium Channel Blocker. Patient is currently in sinus rhythm.[id number] Score: 1. Anticoagulation indicated. Anticoagulation done with Eliquis.  - Continue Diltiazem 180 mg qD  - Continue Eliquis 5 mg BID    Alcohol use disorder, mild, abuse  - Continue MV qD, Thiamine 100 mg qD  - CIWA    Chronic diastolic congestive heart failure  Patient is identified as having Diastolic (HFpEF) heart failure that is Chronic. CHF is currently controlled. Latest ECHO performed and demonstrates  Results for orders placed during the hospital encounter of 11/18/22  Echo  Interpretation Summary  · The left ventricle is normal in size with normal systolic function.  · The estimated ejection fraction is 53%.  · There are segmental left ventricular wall motion abnormalities.  · Normal left ventricular diastolic function.  · There is abnormal septal wall motion.  · Normal right ventricular size with normal right ventricular systolic function.  · Normal central venous pressure (3 mmHg).  · There is a small right pleural effusion.  - Continue Furosemide and monitor clinical status closely. Monitor on telemetry.  - Patient is off CHF pathway.  - Monitor strict Is&Os and  daily weights.  - Place on fluid restriction of 1.5 L.   - Continue to stress to patient importance of self efficacy and  on diet for CHF.   - Last BNP reviewed  - Cardiac diet  - Continue home Lasix 20 mg qD    Dependence on nicotine from cigarettes  - Counseled on smoking cessation, patient reports he will quit after this exacerbation.  - Discussed cessation program  - Patient refused at this time  - Will continue to provide education    Hx of seizure disorder  - Seizure precautions    Essential hypertension  - Continue home Diltiazem 180 mg qD, Lasix 20 mg qD  - Added Losartan      Final Active Diagnoses:    Diagnosis Date Noted POA    PRINCIPAL PROBLEM:  COPD exacerbation [J44.1] 05/14/2017 Yes    Hypomagnesemia [E83.42] 01/08/2023 Yes    Hypokalemia [E87.6] 10/19/2022 Yes    Anemia [D64.9] 07/22/2022 Yes    Gastric ulcer [K25.9] 03/05/2022 Yes    Atrial fibrillation [I48.91] 03/05/2022 Yes    Alcohol use disorder, mild, abuse [F10.10]  Yes     Chronic    Chronic diastolic congestive heart failure [I50.32] 04/19/2020 Yes    Acute on chronic respiratory failure with hypoxia and hypercapnia [J96.21, J96.22] 01/29/2020 Yes    Dependence on nicotine from cigarettes [F17.210] 08/12/2018 Yes    Hx of seizure disorder [Z86.69] 11/20/2017 Not Applicable     Chronic    Essential hypertension [I10] 05/19/2017 Yes     Chronic      Problems Resolved During this Admission:    Diagnosis Date Noted Date Resolved POA    Nonobstructive atherosclerosis of coronary artery [I25.10] 04/23/2020 01/11/2023 Yes     Chronic       Discharged Condition: fair    Disposition: Home or Self Care    Follow Up:   Follow-up Information     Delphine Orona MD. Call.    Specialty: Internal Medicine  Why: Provider wishes to see you tyler 1/11/2023  Contact information:  [address]  [facility] [address] [phone]                       Patient Instructions:      Ambulatory referral/consult to Pulmonology   Standing  Status: Future   Referral Priority: Routine Referral Type: Consultation   Referral Reason: Specialty Services Required   Requested Specialty: Pulmonary Disease   Number of Visits Requested: 1     Diet Cardiac     Notify your health care provider if you experience any of the following:  persistent nausea and vomiting or diarrhea     Notify your health care provider if you experience any of the following:  redness, tenderness, or signs of infection (pain, swelling, redness, odor or green/yellow discharge around incision site)     Notify your health care provider if you experience any of the following:  severe uncontrolled pain     Notify your health care provider if you experience any of the following:  difficulty breathing or increased cough     Notify your health care provider if you experience any of the following:  persistent dizziness, light-headedness, or visual disturbances     Notify your health care provider if you experience any of the following:  severe persistent headache     Notify your health care provider if you experience any of the following:  temperature >100.4     Activity as tolerated       Pending Diagnostic Studies:     None         Medications:  Reconciled Home Medications:      Medication List      START taking these medications    budesonide 0.5 mg/2 mL nebulizer solution  Commonly known as: PULMICORT  Take 2 mLs (0.5 mg total) by nebulization once daily. Controller     losartan 25 MG tablet  Commonly known as: COZAAR  Take 1 tablet (25 mg total) by mouth once daily.     MUCUS RELIEF  mg 12 hr tablet  Generic drug: guaiFENesin  Take 1 tablet (600 mg total) by mouth 2 (two) times daily. for 10 days        CONTINUE taking these medications    albuterol 90 mcg/actuation inhaler  Commonly known as: PROVENTIL/VENTOLIN HFA  Inhale 1-2 puffs into the lungs every 6 (six) hours as needed for Wheezing. Rescue     albuterol-ipratropium 2.5 mg-0.5 mg/3 mL nebulizer solution  Commonly known as:  DUO-NEB  Use 1 vial (3 mLs) by nebulization every 4 (four) hours as needed for Wheezing or Shortness of Breath. Rescue     arformoteroL 15 mcg/2 mL Nebu  Commonly known as: BROVANA  Take 2 mLs (15 mcg total) by nebulization 2 (two) times daily. Controller     atorvastatin 40 MG tablet  Commonly known as: LIPITOR  Take 1 tablet (40 mg total) by mouth once daily.     bismuth subsalicylate 262 mg/15 mL suspension  Commonly known as: PEPTO BISMOL  Take 30 mLs by mouth daily as needed for Indigestion.     CERTAVITE SENIOR 0.4 mg-300 mcg- 250 mcg Tab  Generic drug: multivit-min-FA-lycopen-lutein  Take 1 tablet by mouth once daily.     COMPACT SPACE CHAMBER-LRG MASK Spcr  Generic drug: inhalat.spacing dev,large mask  Use as directed     diltiaZEM 90 MG tablet  Commonly known as: CARDIZEM  Take 2 tablets (180 mg total) by mouth once daily.     ELIQUIS 5 mg Tab  Generic drug: apixaban  Take 1 tablet (5 mg total) by mouth 2 (two) times daily.     ferrous sulfate 325 (65 FE) MG EC tablet  Take 1 tablet (325 mg total) by mouth once daily.     folic acid 1 MG tablet  Commonly known as: FOLVITE  Take 1 tablet (1 mg total) by mouth once daily.     pantoprazole 40 MG tablet  Commonly known as: PROTONIX  Take 1 tablet (40 mg total) by mouth once daily.     thiamine 100 MG tablet  Take 1 tablet (100 mg total) by mouth once daily.     TUMS ORAL  Take 2 tablets by mouth daily as needed (Heartburn).        STOP taking these medications    BREZTRI AEROSPHERE 160-9-4.8 mcg/actuation Hfaa  Generic drug: budesonide-glycopyr-formoterol        ASK your doctor about these medications    azithromycin 250 MG tablet  Commonly known as: Z-DIEGO  Take 1 tablet (250 mg total) by mouth once daily.  Ask about: Should I take this medication?     furosemide 20 MG tablet  Commonly known as: LASIX  Take 1 tablet (20 mg total) by mouth once daily.     predniSONE 20 MG tablet  Commonly known as: DELTASONE  Take 2 tablets (40 mg total) by mouth once daily. for  1 day  Ask about: Should I take this medication?            Indwelling Lines/Drains at time of discharge:   Lines/Drains/Airways     None                 Time spent on the discharge of patient: 36 minutes         Delbert Garcia PA-C  Department of Hospital Medicine  [facility] - Observation 11H

## 2023-01-19 NOTE — ASSESSMENT & PLAN NOTE
Requires CPAP mask & supplies. Mr Mahendra and I called the [facility] during his appointment and they stated that he requires a specialist appointment in order for him to get his mask supplies refilled.  · Scheduled Pulmology Appointment for supply prescription refill

## 2023-01-19 NOTE — ASSESSMENT & PLAN NOTE
Acute on chronic respiratory failure with hypoxia and hypercapnia  Patient's COPD is with exacerbation noted by continued dyspnea and worsening of baseline hypoxia currently.  - Patient is currently on COPD Pathway.   - Continue scheduled inhalers Steroids and Supplemental oxygen and monitor respiratory status closely.  - Patient with Hypercapnic and Hypoxic Respiratory failure which is acute on chronic.   - He is on home oxygen at 2 LPM.  - Supplemental oxygen was provided and noted.  - Signs/symptoms of respiratory failure include tachypnea, increased work of breathing, respiratory distress and wheezing.  - Contributing diagnoses includes CHF and COPD  - Labs and images were reviewed. Patient has not had a recent ABG.   - Will treat underlying causes and adjust management of respiratory failure as follows  - 1/4 SIRS, HR>90  - SpO2 90% on 4 L NC  - HCO3 37, AG 13  - CXR shows mildly coarse interstitial attenuation, similar to the previous exam. A bandlike atelectasis projected over the right midlung zone. No large focal consolidation or pneumothorax.   - In ED, s/p Duonebs x3   - IV Solumedrol x1, start Prednisone 40 mg qD  - Duonebs advanced to q4h  - Started on Azithromycin  - ABG reviewed  - BiPAP qhs  - Incentive spirometry  - Monitoring on tele  - Continue to monitor vitals

## 2023-01-19 NOTE — ASSESSMENT & PLAN NOTE
"Hypoglycemia, hypoalbuminemia, low muscle mass, difficulty ambulating (slow gait speed), fatigue. BMI 19-20, weight variable due to collection method discrepancies. Numerous chronic conditions causing this multifactoral decline: COPD, h/o alcohol abuse, hypermetabolism with work of breath  · Poor prognosis  · High mortality associated with this phenomenan  · Monitor weights and treat COPD  · Strongly advised to avoid smoking  · Previously reported stopping recently (1 week ago)  · Today states he smokes "2 cigarettes per week"  "

## 2023-01-19 NOTE — ASSESSMENT & PLAN NOTE
Patient is identified as having Diastolic (HFpEF) heart failure that is Chronic. CHF is currently controlled. Latest ECHO performed and demonstrates  Results for orders placed during the hospital encounter of 11/18/22  Echo  Interpretation Summary  · The left ventricle is normal in size with normal systolic function.  · The estimated ejection fraction is 53%.  · There are segmental left ventricular wall motion abnormalities.  · Normal left ventricular diastolic function.  · There is abnormal septal wall motion.  · Normal right ventricular size with normal right ventricular systolic function.  · Normal central venous pressure (3 mmHg).  · There is a small right pleural effusion.  - Continue Furosemide and monitor clinical status closely. Monitor on telemetry.  - Patient is off CHF pathway.  - Monitor strict Is&Os and daily weights.  - Place on fluid restriction of 1.5 L.   - Continue to stress to patient importance of self efficacy and  on diet for CHF.   - Last BNP reviewed  - Cardiac diet  - Continue home Lasix 20 mg qD

## 2023-01-19 NOTE — ASSESSMENT & PLAN NOTE
BP overtreated with the addition of losartan.   · Stressed the importance of him bringing ALL his medications and supplies to the next meeting  · Low BP worrisome of hypotension   - Discontinue losartan at next PCP visit (when he brings his pill pack)

## 2023-01-19 NOTE — ASSESSMENT & PLAN NOTE
Patient with Paroxysmal (<7 days) atrial fibrillation which is controlled currently with Calcium Channel Blocker. Patient is currently in sinus rhythm.[id number] Score: 1. Anticoagulation indicated. Anticoagulation done with Eliquis.  - Continue Diltiazem 180 mg qD  - Continue Eliquis 5 mg BID

## 2023-01-19 NOTE — ASSESSMENT & PLAN NOTE
Continues to smoke (2 cigarettes/wk)  · Counseled on smoking cessation & enrolled in a smoking cessation program  ·  Scheduled for an intake appointment next week (1/26) with an NP to quality for free nicotine patches  · Cost of nicotine patches around $40 and patient can not afford them

## 2023-01-20 ENCOUNTER — DOCUMENTATION ONLY (OUTPATIENT)
Dept: CASE MANAGEMENT | Facility: HOSPITAL | Age: 63
End: 2023-01-20
Payer: MEDICARE

## 2023-01-21 ENCOUNTER — TELEPHONE (OUTPATIENT)
Dept: PRIMARY CARE CLINIC | Facility: CLINIC | Age: 63
End: 2023-01-21

## 2023-01-21 DIAGNOSIS — G47.33 OSA (OBSTRUCTIVE SLEEP APNEA): Primary | ICD-10-CM

## 2023-01-21 NOTE — PLAN OF CARE
Management Plan    Patient Name: Baltazar TREVINO SNP (SPECIAL NEEDS PLAN)        [facility]  Delphine Teresa Orona MD             CM spoke w/ pt. [facility] CM will close pt's case at this time.    DINO WheatN, RN, [facility]  Transitional Care Manager  [phone]  [email or web address]

## 2023-01-21 NOTE — PLAN OF CARE
Management Plan    Patient Name: Baltazar TREVINO SNP (SPECIAL NEEDS PLAN)        [facility]  Delphine Orona MD CM attempted to contact pt - N/A and phone goes straight to VM x2 attempts.    CM contacted pt's sister Gladis [phone] and she updated CM that pt has had a change in his contact number. CM updated pt's phone # to [phone]. Gladis expressed concerns about a $69 pharmacy charge - she pays for pt's meds. CM stated would f/u w/ MD and call her back.    CM messaged Dr Orona about sister's concern. The higher cost med is a new inhaler. MD updated clinic pharmacist and he is working on equivalent inhalers that patient assistance could be applied for. CM called pt's sister back and relayed update. CM also spoke w/ pt on his updated number - updated him on the inhaler cost issue and that PCP clinic is working on a solution. CM explained that the clinic will contact him as he will need to complete some paperwork.    DINO WheatN, RN, [facility]  Transitional Care Manager  [phone]  [email or web address]

## 2023-01-21 NOTE — PLAN OF CARE
Management Plan    Patient Name: Baltazar QUINT[facility]ILLA [facility]  Delphine Teresa Orona MD             CM spoke w/ pt via phone and reminded him of his new PCP appt w/ Dr Orona on Monday. Pt is aware and ready for his appt. Pt reports having full o2 tanks.    DINO WheatN, RN, [facility]  Transitional Care Manager  [phone]  [email or web address]

## 2023-01-21 NOTE — PLAN OF CARE
Management Plan    Patient Name: Baltazar QUINTANILLA [facility]  Delphine Orona MD             CM contacted pt via phone to f/u regarding his PCP visit - pt reports everything went well. Pt states there are no needs at this time. Pt states his o2 and Bipap are in working order and he has full o2 tanks.    EULOGIO Wheat, RN, [facility]  Transitional Care Manager  [phone]  [email or web address]

## 2023-01-22 NOTE — TELEPHONE ENCOUNTER
Sleep medicine referral placed. Please call sleep medicine to see if we can get his appt expedited. He has severe COPD and needs CPAP supplies. Unfortunately he needs to see sleep medicine for the supplies to get ordered. Has never seen sleep med here.    Thanks,  GRANT

## 2023-01-22 NOTE — TELEPHONE ENCOUNTER
----- Message from Carlo Pascal MD sent at 1/20/2023  8:09 AM CST -----  Hello. Could you place a referral for sleep? I don't see that he's ever seen someone but that he was started on bipap with one of his hospitalizations. I'll also reach out to get him into our clinic given he's still exacerbating so frequently. Benito,  Douglas  ----- Message -----  From: Delphine Orona MD  Sent: 1/18/2023  11:35 AM CST  To: Carlo Pascal MD, Isidra Mejias    Can you assist with ordering CPAP supplies for this patient? We spoke with DME and they have to come from Pulm or sleep medicine    Thanks!  KJ

## 2023-01-23 ENCOUNTER — PES CALL (OUTPATIENT)
Dept: ADMINISTRATIVE | Facility: CLINIC | Age: 63
End: 2023-01-23
Payer: MEDICARE

## 2023-01-23 ENCOUNTER — TELEPHONE (OUTPATIENT)
Dept: SLEEP MEDICINE | Facility: CLINIC | Age: 63
End: 2023-01-23
Payer: MEDICARE

## 2023-01-23 NOTE — TELEPHONE ENCOUNTER
----- Message from Delphine Orona MD sent at 1/21/2023  8:51 PM CST -----  Can you help me get Mr Mccray an appt with sleep medicine? I placed the referral. He has severe COPD and is in need of CPAP supplies. Unfortunately I can't order them. He has never seen sleep medicine here.    Thanks!  GRANT (PCP)  ----- Message -----  From: Carlo Pascal MD  Sent: 1/20/2023   8:11 AM CST  To: Delphine Orona MD, Isidra Gonsalez. Could you place a referral for sleep? I don't see that he's ever seen someone but that he was started on bipap with one of his hospitalizations. I'll also reach out to get him into our clinic given he's still exacerbating so frequently. Thanks,  Douglas  ----- Message -----  From: Delphine Orona MD  Sent: 1/18/2023  11:35 AM CST  To: Carlo Pascal MD, Isidra Mejias    Can you assist with ordering CPAP supplies for this patient? We spoke with DME and they have to come from Pulm or sleep medicine    Thanks!  GRANT       Stop Xarelto  Take ASA every noring with food  Continue with regular activities

## 2023-01-24 ENCOUNTER — TELEPHONE (OUTPATIENT)
Dept: PRIMARY CARE CLINIC | Facility: CLINIC | Age: 63
End: 2023-01-24
Payer: MEDICARE

## 2023-01-24 NOTE — TELEPHONE ENCOUNTER
[facility] to f/u on a fax that I sent for the Pharmacy Student just to confirm that they received it. She stated yes and that the pt was denied services due to receiving government insurance. She also stated that we should try Good RX program.

## 2023-01-25 ENCOUNTER — OFFICE VISIT (OUTPATIENT)
Dept: PRIMARY CARE CLINIC | Facility: CLINIC | Age: 63
End: 2023-01-25
Payer: MEDICARE

## 2023-01-25 VITALS
TEMPERATURE: 98 F | SYSTOLIC BLOOD PRESSURE: 112 MMHG | HEART RATE: 88 BPM | HEIGHT: 66 IN | WEIGHT: 136.38 LBS | BODY MASS INDEX: 21.92 KG/M2 | DIASTOLIC BLOOD PRESSURE: 60 MMHG | OXYGEN SATURATION: 94 %

## 2023-01-25 DIAGNOSIS — G47.33 OSA (OBSTRUCTIVE SLEEP APNEA): ICD-10-CM

## 2023-01-25 DIAGNOSIS — J44.9 PULMONARY CACHEXIA DUE TO CHRONIC OBSTRUCTIVE PULMONARY DISEASE: ICD-10-CM

## 2023-01-25 DIAGNOSIS — R10.13 EPIGASTRIC PAIN: Primary | ICD-10-CM

## 2023-01-25 DIAGNOSIS — R64 PULMONARY CACHEXIA DUE TO CHRONIC OBSTRUCTIVE PULMONARY DISEASE: ICD-10-CM

## 2023-01-25 DIAGNOSIS — J42 CHRONIC BRONCHITIS, UNSPECIFIED CHRONIC BRONCHITIS TYPE: Chronic | ICD-10-CM

## 2023-01-25 PROCEDURE — 1159F PR MEDICATION LIST DOCUMENTED IN MEDICAL RECORD: ICD-10-PCS | Mod: CPTII,S$GLB,, | Performed by: INTERNAL MEDICINE

## 2023-01-25 PROCEDURE — 94640 AIRWAY INHALATION TREATMENT: CPT | Mod: 59,S$GLB,, | Performed by: INTERNAL MEDICINE

## 2023-01-25 PROCEDURE — 1111F DSCHRG MED/CURRENT MED MERGE: CPT | Mod: CPTII,S$GLB,, | Performed by: INTERNAL MEDICINE

## 2023-01-25 PROCEDURE — 1111F PR DISCHARGE MEDS RECONCILED W/ CURRENT OUTPATIENT MED LIST: ICD-10-PCS | Mod: CPTII,S$GLB,, | Performed by: INTERNAL MEDICINE

## 2023-01-25 PROCEDURE — 3078F DIAST BP <80 MM HG: CPT | Mod: CPTII,S$GLB,, | Performed by: INTERNAL MEDICINE

## 2023-01-25 PROCEDURE — 4010F PR ACE/ARB THEARPY RXD/TAKEN: ICD-10-PCS | Mod: CPTII,S$GLB,, | Performed by: INTERNAL MEDICINE

## 2023-01-25 PROCEDURE — 3008F BODY MASS INDEX DOCD: CPT | Mod: CPTII,S$GLB,, | Performed by: INTERNAL MEDICINE

## 2023-01-25 PROCEDURE — 3008F PR BODY MASS INDEX (BMI) DOCUMENTED: ICD-10-PCS | Mod: CPTII,S$GLB,, | Performed by: INTERNAL MEDICINE

## 2023-01-25 PROCEDURE — 3074F PR MOST RECENT SYSTOLIC BLOOD PRESSURE < 130 MM HG: ICD-10-PCS | Mod: CPTII,S$GLB,, | Performed by: INTERNAL MEDICINE

## 2023-01-25 PROCEDURE — 99213 OFFICE O/P EST LOW 20 MIN: CPT | Mod: 25,S$GLB,, | Performed by: INTERNAL MEDICINE

## 2023-01-25 PROCEDURE — 99213 PR OFFICE/OUTPT VISIT, EST, LEVL III, 20-29 MIN: ICD-10-PCS | Mod: 25,S$GLB,, | Performed by: INTERNAL MEDICINE

## 2023-01-25 PROCEDURE — 4010F ACE/ARB THERAPY RXD/TAKEN: CPT | Mod: CPTII,S$GLB,, | Performed by: INTERNAL MEDICINE

## 2023-01-25 PROCEDURE — 94640 PR INHAL RX, AIRWAY OBST/DX SPUTUM INDUCT: ICD-10-PCS | Mod: 59,S$GLB,, | Performed by: INTERNAL MEDICINE

## 2023-01-25 PROCEDURE — 3074F SYST BP LT 130 MM HG: CPT | Mod: CPTII,S$GLB,, | Performed by: INTERNAL MEDICINE

## 2023-01-25 PROCEDURE — 3078F PR MOST RECENT DIASTOLIC BLOOD PRESSURE < 80 MM HG: ICD-10-PCS | Mod: CPTII,S$GLB,, | Performed by: INTERNAL MEDICINE

## 2023-01-25 PROCEDURE — 1159F MED LIST DOCD IN RCRD: CPT | Mod: CPTII,S$GLB,, | Performed by: INTERNAL MEDICINE

## 2023-01-25 RX ORDER — ACETAMINOPHEN 325 MG/1
650 TABLET ORAL
Status: COMPLETED | OUTPATIENT
Start: 2023-01-25 | End: 2023-01-25

## 2023-01-25 RX ORDER — IPRATROPIUM BROMIDE AND ALBUTEROL SULFATE 2.5; .5 MG/3ML; MG/3ML
3 SOLUTION RESPIRATORY (INHALATION)
Status: COMPLETED | OUTPATIENT
Start: 2023-01-25 | End: 2023-01-25

## 2023-01-25 RX ORDER — ACETAMINOPHEN 500 MG
500 TABLET ORAL EVERY 6 HOURS PRN
Qty: 90 TABLET | Refills: 0 | Status: SHIPPED | OUTPATIENT
Start: 2023-01-25 | End: 2023-03-10 | Stop reason: DRUGHIGH

## 2023-01-25 RX ORDER — BUDESONIDE 0.5 MG/2ML
0.5 INHALANT ORAL 2 TIMES DAILY
Qty: 1440 ML | Refills: 3 | Status: SHIPPED | OUTPATIENT
Start: 2023-01-25 | End: 2024-01-25

## 2023-01-25 RX ADMIN — ACETAMINOPHEN 650 MG: 325 TABLET ORAL at 12:01

## 2023-01-25 RX ADMIN — IPRATROPIUM BROMIDE AND ALBUTEROL SULFATE 3 ML: 2.5; .5 SOLUTION RESPIRATORY (INHALATION) at 12:01

## 2023-01-25 NOTE — PROGRESS NOTES
Adherence re-packed using Dispill for 14 days:  (Compressed monthly)     Morning Pocket:  Diltiazam 180 mg (2 tablets)   Eliquis 5 mg   Furosemide 20 mg  Ferrous Sulfate 325mg   Pantoprazole 40 mg       Evening Pocket:  Atorvastatin 40 mg    Eliquis 5 mg    Folic Acid 1mg   MVI 50+   Thiamine 100 mg       *Removed Losartan 25 mg from evening pockets. MDO requested removal.

## 2023-01-25 NOTE — MEDICAL/APP STUDENT
Primary Care Provider Appointment - [facility]  SHARED NOTE: Isidra Mejias (MS-4), Dr Orona (Attending)    Subjective:      Patient ID: Baltazar Mccray is a 62 y.o. male with COPD, HTN, HLN, low health literacy, and recent frequent hospitalizations    Chief Complaint: Follow-up    Prior to this visit, patient's last encounter with PCP was 1/18/2023.    Toothache started a week ago on his R bottom teeth incisors. Pain waxes and wanes, rated at a 4/10 and interrupts sleep. Warm salty water helps only minimally. Patient believes this to be due to a cavity. No change to dental routine. Requesting Tylenol for pain.    Alcohol only once a month. Wants to go to dentist and is looking for a referral.     Losartan was taken out of his pill pack due to consistent hypotension.        FU in 1 week to specifically check toothache and SOB      4Ms for Medical Decision-Making in Older Adults    Last Completed EAWV: None    MOBILITY:  Get Up and Go:  No flowsheet data found.  Activities of Daily Living:  No flowsheet data found.  Whisper Test:  No flowsheet data found.  Disability Status:  Disability Status 10/3/2018   Are you deaf or do you have serious difficulty hearing? No   Are you blind or do you have serious difficulty seeing, even when wearing glasses? No   Because of a physical, mental, or emotional condition, do you have serious difficulty concentrating, remembering, or making decisions? No   Do you have serious difficulty walking or climbing stairs? No   Do you have difficulty dressing or bathing? No   Because of a physical, mental, or emotional condition, do you have difficulty doing errands alone such as visiting a doctor's office or shopping? No     Nutrition Screening:  No flowsheet data found. Screening Score: 0-7 Malnourished, 8-11 At Risk, 12-14 Normal    MENTATION:   Depression Patient Health Questionnaire:  Depression Patient Health Questionnaire 1/25/2023   Over the last two weeks how often have you been  bothered by little interest or pleasure in doing things Not at all   Over the last two weeks how often have you been bothered by feeling down, depressed or hopeless Not at all   PHQ-2 Total Score 0   PHQ-4 Total Score -     Has Dementia Dx: No    Cognitive Function Screening:  No flowsheet data found.  Cognitive Function Screening Total - Less than 4 = Abnormal,  Greater than or equal to 4 = Normal    MEDICATIONS:  High Risk Medications:  Total Active Medications: 0  This patient does not have an active medication from one of the medication groupers.    WHAT MATTERS MOST:  Advance Care Planning   ACP Status:   Patient has had an ACP conversation  Living Will: No  Power of : No  LaPOST: No    What is most important right now is to focus on avoiding the hospital    Accordingly, we have decided that the best plan to meet the patient's goals includes continuing with treatment                   Social History     Socioeconomic History    Marital status: Single   Tobacco Use    Smoking status: Some Days     Packs/day: 0.25     Types: Cigarettes    Smokeless tobacco: Never    Tobacco comments:     1-2 cigarettes per day   Substance and Sexual Activity    Alcohol use: Yes     Alcohol/week: 2.0 standard drinks     Types: 2 Cans of beer per week     Comment: every night    Drug use: No    Sexual activity: Not Currently   Social History Narrative    Patient' nephew is currently living with him in his apartment. Patient's sister Viry ([phone]) helps manage patient's care.            6/3/21    Patient is no longer staying with family. Patient is currently staying at the [facility] and working on getting into their program for more supportive housing.      Social Determinants of Health     Financial Resource Strain: Low Risk     Difficulty of Paying Living Expenses: Not hard at all   Food Insecurity: Unknown    Worried About Running Out of Food in the Last Year: Patient refused    Ran Out of Food in the Last Year:  "Patient refused   Transportation Needs: No Transportation Needs    Lack of Transportation (Medical): No    Lack of Transportation (Non-Medical): No   Physical Activity: Unknown    Days of Exercise per Week: Patient refused    Minutes of Exercise per Session: Patient refused   Stress: Stress Concern Present    Feeling of Stress : Rather much   Social Connections: Socially Isolated    Frequency of Communication with Friends and Family: More than three times a week    Frequency of Social Gatherings with Friends and Family: Twice a week    Attends Latter day Services: Never    Active Member of Clubs or Organizations: No    Attends Club or Organization Meetings: Patient refused    Marital Status: Never    Housing Stability: Low Risk     Unable to Pay for Housing in the Last Year: No    Number of Places Lived in the Last Year: 2    Unstable Housing in the Last Year: No       Review of Systems   Constitutional:  Negative for activity change, appetite change, fatigue and fever.   HENT:  Positive for dental problem. Negative for congestion, facial swelling, sinus pain and sore throat.    Respiratory:  Positive for cough and shortness of breath.    Cardiovascular:  Negative for palpitations.   Gastrointestinal:  Negative for abdominal distention and abdominal pain.   Genitourinary:  Negative for decreased urine volume and difficulty urinating.   Psychiatric/Behavioral:  Positive for agitation. Negative for behavioral problems and confusion.      Objective:   /60 (BP Location: Right arm, Patient Position: Sitting, BP Method: Medium (Manual))   Pulse 88   Temp 98.3 °F (36.8 °C) (Oral)   Ht 5' 6" (1.676 m)   Wt 61.8 kg (136 lb 5.7 oz)   SpO2 (!) 94%   BMI 22.01 kg/m²     Physical Exam  Constitutional:       General: He is not in acute distress.     Appearance: Normal appearance. He is not toxic-appearing.   HENT:      Head: Normocephalic and atraumatic.      Nose: Nose normal.      Mouth/Throat:      Mouth: " Mucous membranes are dry.      Pharynx: No oropharyngeal exudate or posterior oropharyngeal erythema.      Comments: Orange plaques on all teeth, lower R incisor / 1st molar is broken with dried blood on surface and the gums underneath swollen  Cardiovascular:      Rate and Rhythm: Normal rate and regular rhythm.      Pulses: Normal pulses.      Heart sounds: Normal heart sounds.   Pulmonary:      Effort: Pulmonary effort is normal.      Breath sounds: Wheezing present.   Chest:      Chest wall: No tenderness.   Abdominal:      General: Abdomen is flat.      Palpations: Abdomen is soft.   Musculoskeletal:         General: Normal range of motion.      Cervical back: Normal range of motion and neck supple. No tenderness.   Skin:     General: Skin is warm and dry.      Capillary Refill: Capillary refill takes less than 2 seconds.   Neurological:      General: No focal deficit present.      Mental Status: He is alert.   Psychiatric:         Mood and Affect: Mood normal.         Behavior: Behavior normal.           Lab Results   Component Value Date    WBC 11.00 01/10/2023    HGB 11.8 (L) 01/10/2023    HCT 39.8 (L) 01/10/2023     01/10/2023    CHOL 260 (H) 04/19/2020    TRIG 145 04/19/2020    HDL 91 (H) 04/19/2020    ALT 14 01/08/2023    AST 20 01/08/2023     01/10/2023    K 4.1 01/10/2023    CL 97 01/10/2023    CREATININE 1.0 01/10/2023    BUN 17 01/10/2023    CO2 31 (H) 01/10/2023    TSH 1.137 12/05/2022    INR 1.1 12/08/2022    HGBA1C 5.7 (H) 02/10/2022       Current Outpatient Medications on File Prior to Visit   Medication Sig Dispense Refill    albuterol (PROVENTIL/VENTOLIN HFA) 90 mcg/actuation inhaler Inhale 1-2 puffs into the lungs every 6 (six) hours as needed for Wheezing. Rescue 8.5 g 2    albuterol-ipratropium (DUO-NEB) 2.5 mg-0.5 mg/3 mL nebulizer solution Use 1 vial (3 mLs) by nebulization every 4 (four) hours as needed for Wheezing or Shortness of Breath. Rescue 180 mL 2    apixaban (ELIQUIS)  5 mg Tab Take 1 tablet (5 mg total) by mouth 2 (two) times daily. 180 tablet 3    arformoteroL (BROVANA) 15 mcg/2 mL Nebu Take 2 mLs (15 mcg total) by nebulization 2 (two) times daily. Controller 360 mL 1    atorvastatin (LIPITOR) 40 MG tablet Take 1 tablet (40 mg total) by mouth once daily. 90 tablet 3    bismuth subsalicylate (PEPTO BISMOL) 262 mg/15 mL suspension Take 30 mLs by mouth daily as needed for Indigestion.      budesonide (PULMICORT) 0.5 mg/2 mL nebulizer solution Take 2 mLs (0.5 mg total) by nebulization once daily. Controller 60 mL 1    budesonide-glycopyr-formoterol (BREZTRI AEROSPHERE) 160-9-4.8 mcg/actuation HFAA Inhale 1 application into the lungs 2 (two) times daily. 10.7 g 3    calcium carbonate (TUMS ORAL) Take 2 tablets by mouth daily as needed (Heartburn).      diltiaZEM (CARDIZEM) 90 MG tablet Take 2 tablets (180 mg total) by mouth once daily. 180 tablet 3    ferrous sulfate 325 (65 FE) MG EC tablet Take 1 tablet (325 mg total) by mouth once daily. 30 tablet 1    folic acid (FOLVITE) 1 MG tablet Take 1 tablet (1 mg total) by mouth once daily. 30 tablet 2    furosemide (LASIX) 20 MG tablet Take 1 tablet (20 mg total) by mouth once daily. 90 tablet 3    inhalat.spacing dev,large mask (COMPACT SPACE CHAMBER-LRG MASK) Spcr Use as directed 1 each 0    losartan (COZAAR) 25 MG tablet Take 1 tablet (25 mg total) by mouth once daily. 60 tablet 0    multivit-min-FA-lycopen-lutein (CERTAVITE SENIOR) 0.4 mg-300 mcg- 250 mcg Tab Take 1 tablet by mouth once daily. 30 tablet 2    nicotine (NICODERM CQ) 21 mg/24 hr Place 1 patch onto the skin once daily. 28 patch 11    pantoprazole (PROTONIX) 40 MG tablet Take 1 tablet (40 mg total) by mouth once daily. 30 tablet 11    thiamine 100 MG tablet Take 1 tablet (100 mg total) by mouth once daily. 30 tablet 2    [DISCONTINUED] tiotropium bromide (SPIRIVA RESPIMAT) 2.5 mcg/actuation inhaler Inhale 2 puffs into the lungs Daily. Controller 4 g 5     No current  facility-administered medications on file prior to visit.         Assessment:   62 y.o. male with multiple co-morbid illnesses here to follow-up with PCP and continue work-up of chronic issues:    Plan:       Pulmonary     Pulmonary cachexia due to chronic obstructive pulmonary disease       Hypoglycemia, hypoalbuminemia, low muscle mass, difficulty ambulating (slow gait speed), fatigue. BMI 19-20, weight variable due to collection method discrepancies. Numerous chronic conditions causing this multifactoral decline: COPD, h/o alcohol abuse, hypermetabolism with work of breath  Poor prognosis  High mortality associated with this phenomenan  Today stated he has not smoked since last week  Given Brovana however discovered that he lost his Pulmicort - unable to get new pulmicort until Feb 2 due to insurance           Relevant Orders     Ambulatory referral/consult to [facility]     Smokers' cough     Relevant Medications     nicotine (NICODERM CQ) 21 mg/24 hr     Other Relevant Orders     Ambulatory referral/consult to Smoking Cessation Program          Cardiac/Vascular     Essential hypertension (Chronic)       BP overtreated with the addition of losartan.       - Discontinud losartan                                Other     Dependence on nicotine from cigarettes       Continues to smoke (2 cigarettes/wk)  Counseled on smoking cessation & enrolled in a smoking cessation program   Scheduled for an intake appointment next week (1/26) with an NP to quality for free nicotine patches  Cost of nicotine patches around $40 and patient can not afford them           Relevant Orders     Ambulatory referral/consult to Smoking Cessation Program     Cigarette nicotine dependence without complication     Relevant Medications     nicotine (NICODERM CQ) 21 mg/24 hr     Other Relevant Orders     Ambulatory referral/consult to Smoking Cessation Program     LIBRA (obstructive sleep apnea) - Primary        Requires CPAP mask & supplies. Mr Mccray and I called the [facility] during his appointment and they stated that he requires a specialist appointment in order for him to get his mask supplies refilled.  Scheduled Pulmology Appointment for supply prescription refill  Awaiting appointment with Sleep Medicine doctor               Health Maintenance         Date Due Completion Date    TETANUS VACCINE Never done ---    Colorectal Cancer Screening Never done ---    Hemoglobin A1c (Prediabetes) 02/10/2023 2/10/2022    Shingles Vaccine (2 of 2) 03/15/2023 1/18/2023    High Dose Statin 01/18/2024 1/18/2023    Lipid Panel 04/19/2025 4/19/2020    Pneumococcal Vaccines (Age 0-64) (3 - PPSV23 if available, else PCV20) 12/19/2027 12/19/2022            Future Appointments   Date Time Provider Department Center   1/25/2023 12:30 PM Delphine Orona MD [facility] MED CLN [address] PCW   1/26/2023  9:00 AM [facility]   1/26/2023 10:00 AM Remedios Hsieh, ASIFS [facility] SMOKE [address]   1/31/2023  9[facility] [address] [facility]   1/31/2023  9:30 AM Carlo Pascal MD [facility] PULMSVC [address]   2/7/2023  8:00 AM Susan Rae, NP [address]   2/20/2023 10:00 AM Delphine Orona MD [facility]         No follow-ups on file. Total clinical care time was 60 min, issues addressed include.    Isidra Mejias, MS-4    Delphine Orona MD/MPH  [facility]  [phone] [employer]

## 2023-01-25 NOTE — PATIENT INSTRUCTIONS
TODAY:  A- use your duoneb (albuterol and ipratropium) in your nebulizer machineas much as possible!    B- use your brovana twice daily in your nebulizer machine    C- use your pulmicort twice daily in your nebulizer machine    Take tylenol for pain    YOU ARE DOING GREAT!!!

## 2023-01-26 DIAGNOSIS — J42 CHRONIC BRONCHITIS, UNSPECIFIED CHRONIC BRONCHITIS TYPE: Primary | Chronic | ICD-10-CM

## 2023-01-26 DIAGNOSIS — J99 RESPIRATORY DISORDERS IN DISEASES CLASSIFIED ELSEWHERE: ICD-10-CM

## 2023-01-26 NOTE — PROGRESS NOTES
"Subjective:       Patient ID: Baltazar Mccray is a 62 y.o. male.    Chief Complaint: COPD    HPI:   Baltazar Mccray is a 62 y.o. male last seen by me 7/7/2022 who presents for follow up of COPD    Initial History 7/7/2022:  COPD- multiple hospitalizations in the last year. Dyspnea with any activity. Worsening. Reports a cough throughout the day that is productive of yellow sputum. Continues to smoke. Denies chest pain, palpitations, f/c/ns. Using Breztri daily. Using oxygen PRN. States he does not smoke with oxygen on.    Denies rashes, myalgias, arthralgias, hair/nail changes, dysphagia, eye changes, raynauds, mucosal ulcerations    Interval History:  Several hospitalizations since that time for acute exacerbations of COPD and acute on chronic hypercap RF. Discharged yesterday from his most recent hospitalization. Was having difficulty getting a filter for his Bipap supplies. States he has everything and used his bipap last night. Did not receive roflumilast. Was not discharged with a pred taper.     Continues to smoke. Reports dyspnea with minimal exertion. On 2L of oxygen continuously. Has a daily, productive cough.     Exposures:  Work- States having work history at "[employer] for appx 3 yrs in the 90's. 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   Tobacco- 1/4 ppd since he was a teenager  Inhalational Agents- denies  Mold- denies  Birds- denies  Medications- n/a    Childhood history of Lung Disease: Denies    Review of Systems   Constitutional:  Positive for activity change. Negative for fever, chills, weight loss, appetite change, night sweats and weakness.   HENT:  Negative for postnasal drip, sinus pressure, voice change and congestion.    Eyes:  Negative for redness.   Respiratory:  Positive for cough, sputum production, shortness of breath, previous hospitalization due to pulmonary problems, asthma nighttime symptoms and dyspnea on extertion. Negative for wheezing, orthopnea and " somnolence.    Cardiovascular:  Negative for chest pain, palpitations and leg swelling.   Musculoskeletal:  Negative for joint swelling and myalgias.   Skin:  Negative for rash.   Gastrointestinal:  Negative for acid reflux.   Neurological:  Negative for syncope and weakness.   Psychiatric/Behavioral:  Negative for sleep disturbance.        Social History     Tobacco Use    Smoking status: Some Days     Packs/day: 0.25     Types: Cigarettes     Passive exposure: Past    Smokeless tobacco: Never    Tobacco comments:     1-2 cigarettes per day     1/31 Patient quick smoking 1 month ago   Substance Use Topics    Alcohol use: Yes     Alcohol/week: 2.0 standard drinks     Types: 2 Cans of beer per week     Comment: every night       Review of patient's allergies indicates:   Allergen Reactions    Benazepril Swelling     Past Medical History:   Diagnosis Date    Aspiration pneumonia 5/30/2021    Asthma     Atrial fibrillation with rapid ventricular response     CHF (congestive heart failure)     COPD (chronic obstructive pulmonary disease)     home O2 at night only    Coronary artery disease     Hypertension     Lung nodule     Pneumonia     Seizures      Past Surgical History:   Procedure Laterality Date    ESOPHAGOGASTRODUODENOSCOPY N/A 2/11/2022    Procedure: EGD (ESOPHAGOGASTRODUODENOSCOPY);  Surgeon: Cain Ortega MD;  Location: [address]);  Service: Endoscopy;  Laterality: N/A;    ESOPHAGOGASTRODUODENOSCOPY N/A 9/15/2022    Procedure: EGD (ESOPHAGOGASTRODUODENOSCOPY);  Surgeon: Leonid Chavez MD;  Location: [address]);  Service: Endoscopy;  Laterality: N/A;  PA-50 - 2nd floor  vacc-wears 2L o2-inst mail and verbal-clears 4 hrs prior-tb  8/30 pt rescheduled; updated instructions mailed-st    LEFT HEART CATHETERIZATION  4/23/2020    Procedure: Left heart cath;  Surgeon: Nic Pollack MD;  Location: [facility] CATH LAB;  Service: Cardiology;;     Current Outpatient Medications on File Prior to Visit    Medication Sig    acetaminophen (TYLENOL) 500 MG tablet Take 1 tablet (500 mg total) by mouth every 6 (six) hours as needed for Pain.    albuterol (PROVENTIL/VENTOLIN HFA) 90 mcg/actuation inhaler Inhale 1-2 puffs into the lungs every 6 (six) hours as needed for Wheezing. Rescue    albuterol-ipratropium (DUO-NEB) 2.5 mg-0.5 mg/3 mL nebulizer solution Use 1 vial (3 mLs) by nebulization every 4 (four) hours as needed for Wheezing or Shortness of Breath. Rescue    apixaban (ELIQUIS) 5 mg Tab Take 1 tablet (5 mg total) by mouth 2 (two) times daily.    atorvastatin (LIPITOR) 40 MG tablet Take 1 tablet (40 mg total) by mouth once daily.    bismuth subsalicylate (PEPTO BISMOL) 262 mg/15 mL suspension Take 30 mLs by mouth daily as needed for Indigestion.    budesonide (PULMICORT) 0.5 mg/2 mL nebulizer solution Take 2 mLs (0.5 mg total) by nebulization 2 (two) times a day. Controller    calcium carbonate (TUMS ORAL) Take 2 tablets by mouth daily as needed (Heartburn).    diltiaZEM (CARDIZEM) 90 MG tablet Take 2 tablets (180 mg total) by mouth once daily.    EScitalopram oxalate (LEXAPRO) 10 MG tablet Take 1 tablet (10 mg total) by mouth once daily.    ferrous sulfate 325 (65 FE) MG EC tablet Take 1 tablet (325 mg total) by mouth once daily.    folic acid (FOLVITE) 1 MG tablet Take 1 tablet (1 mg total) by mouth once daily.    furosemide (LASIX) 20 MG tablet Take 1 tablet (20 mg total) by mouth once daily.    inhalat.spacing dev,large mask (COMPACT SPACE CHAMBER-LRG MASK) Spcr Use as directed    LORazepam (ATIVAN) 0.5 MG tablet Take 1 tablet (0.5 mg total) by mouth every 6 (six) hours as needed (anxiety, air hunger).    losartan (COZAAR) 25 MG tablet Take 1 tablet (25 mg total) by mouth once daily.    multivit-min-FA-lycopen-lutein (CERTAVITE SENIOR) 0.4 mg-300 mcg- 250 mcg Tab Take 1 tablet by mouth once daily.    nicotine (NICODERM CQ) 21 mg/24 hr Place 1 patch onto the skin once daily.    pantoprazole (PROTONIX) 40 MG  tablet Take 1 tablet (40 mg total) by mouth once daily.    thiamine 100 MG tablet Take 1 tablet (100 mg total) by mouth once daily.    tiotropium bromide (SPIRIVA RESPIMAT) 2.5 mcg/actuation inhaler Inhale 2 puffs into the lungs Daily. Controller     Current Facility-Administered Medications on File Prior to Visit   Medication    [DISCONTINUED] acetaminophen tablet 650 mg    [DISCONTINUED] albuterol-ipratropium 2.5 mg-0.5 mg/3 mL nebulizer solution 3 mL    [DISCONTINUED] albuterol-ipratropium 2.5 mg-0.5 mg/3 mL nebulizer solution 3 mL    [DISCONTINUED] aluminum-magnesium hydroxide-simethicone 200-200-20 mg/5 mL suspension 30 mL    [DISCONTINUED] apixaban tablet 5 mg    [DISCONTINUED] atorvastatin tablet 40 mg    [DISCONTINUED] bisacodyL suppository 10 mg    [DISCONTINUED] budesonide nebulizer solution 0.5 mg    [DISCONTINUED] dextrose 10% bolus 125 mL 125 mL    [DISCONTINUED] dextrose 10% bolus 250 mL 250 mL    [DISCONTINUED] diltiaZEM tablet 180 mg    [DISCONTINUED] ferrous sulfate tablet 1 each    [DISCONTINUED] folic acid tablet 1 mg    [DISCONTINUED] furosemide tablet 20 mg    [DISCONTINUED] glucagon (human recombinant) injection 1 mg    [DISCONTINUED] glucose chewable tablet 16 g    [DISCONTINUED] glucose chewable tablet 24 g    [DISCONTINUED] losartan tablet 50 mg    [DISCONTINUED] melatonin tablet 6 mg    [DISCONTINUED] naloxone 0.4 mg/mL injection 0.02 mg    [DISCONTINUED] nicotine 21 mg/24 hr 1 patch    [DISCONTINUED] ondansetron disintegrating tablet 8 mg    [DISCONTINUED] pantoprazole EC tablet 40 mg    [DISCONTINUED] polyethylene glycol packet 17 g    [DISCONTINUED] prochlorperazine injection Soln 5 mg    [DISCONTINUED] salmeteroL diskus inhaler 1 puff    [DISCONTINUED] simethicone chewable tablet 80 mg    [DISCONTINUED] sodium chloride 0.9% flush 10 mL    [DISCONTINUED] sodium chloride 0.9% flush 10 mL    [DISCONTINUED] thiamine tablet 100 mg       Objective:      Vitals:    01/31/23 0930   BP: 119/70  "  BP Location: Right arm   Patient Position: Sitting   Pulse: 91   SpO2: (!) 80%  Comment: 2.0   Weight: 62.2 kg (137 lb 2 oz)   Height: 5' 6" (1.676 m)     Improved to 93% on 2L      Physical Exam   Constitutional: He appears well-developed and well-nourished.   Neck: No tracheal deviation present.   Cardiovascular: Normal rate, regular rhythm and intact distal pulses.   Pulmonary/Chest: Normal expansion, symmetric chest wall expansion and effort normal. No respiratory distress. He has wheezes. He has rhonchi. He has no rales.   Abdominal: He exhibits no distension.   Musculoskeletal:         General: No edema.   Lymphadenopathy: No supraclavicular adenopathy is present.     He has no cervical adenopathy.   Skin: Skin is warm and dry. No cyanosis or erythema. Nails show no clubbing.   Nursing note and vitals reviewed.    Personal Diagnostic Review    Laboratory:  1/30/23  Eos 0.5, 5.7%  Bicarb 32    4/1/22   Bicarb- 29  Eosinophils- 0.5    CTA Chest 1/29/23- Images personally reviewed. I agree w/ the radiologist who notes;  1. No evidence of pulmonary embolism.  2. Emphysematous changes of the lungs with mild bilateral interstitial scarring or infiltrate.  3. Mild nodular airspace disease in the left lower lobe could represent minimal consolidation/infiltrate.  Mild scarring or atelectasis are not excluded.  4. Few small lower lobe nonspecific micro nodules.    CT Chest 1/28/22- Images personally reviewed and compared to priors. I agree w/ the radiologist who notes;  1. Examination considered not blastic to the proximal segmental pulmonary arterial level without convincing evidence of acute pulmonary embolism.  2. No acute intrathoracic findings identified.  3. Appearance of the lungs without substantial interval change when compared to most recent CTA chest performed 12/21/2021.  Redemonstrated emphysema with areas of architectural distortion nonspecific ground-glass attenuation.  Stable appearance of 12-13 mm " nodule in the left lower lobe.  For multiple solid nodules with any 6 mm or greater, Fleischner Society guidelines recommend follow up with non-contrast chest CT at 3-6 months and 18-24 months after discovery.  4. Additional details, as provided in the body report.    CXR 3/13/22- Images personally reviewed. I agree w/ the radiologist who notes  Left greater than right perihilar and upper lung zone predominant interval increased nonspecific interstitial coarsening which can be seen with worsening interstitial type pneumonia from inflammatory or infectious process including atypical bacterial or viral etiology versus more atypical distribution of pulmonary edema.  No large consolidation.    PFTs   1/31/23- End stage obstruction and reduction in DLCO with hyperinflation and air trapping.  4/27/21- very severe obstruction and severe reduction in dlco    No flowsheet data found.        Assessment:     Problem List Items Addressed This Visit

## 2023-01-26 NOTE — ASSESSMENT & PLAN NOTE
Requires CPAP mask & supplies. Mr Mahendra and I called the [facility] during his appointment and they stated that he requires a specialist appointment in order for him to get his mask supplies refilled.  · Needs sleep medicine appt

## 2023-01-26 NOTE — PROGRESS NOTES
Primary Care Provider Appointment- [facility]    Subjective:      Patient ID: Baltazar Mccray is a 62 y.o. male with COPD    Chief Complaint: Follow-up    Patient seen today for COPD management. He brought in all meds and is getting them med rec'd today.     He has run out of BREZTRI, and is willing to switch to neb treatment.     After extensive education he understood how to use the duoneb frequent and PRN. Additionally, he does not have his pulmicort. He believes it was refilled, but doesn't know where it is located.    A- use your duoneb (albuterol and ipratropium) in your nebulizer machineas much as possible!    B- use your brovana twice daily in your nebulizer machine    C- use your pulmicort twice daily in your nebulizer machine      Past Surgical History:   Procedure Laterality Date    ESOPHAGOGASTRODUODENOSCOPY N/A 2/11/2022    Procedure: EGD (ESOPHAGOGASTRODUODENOSCOPY);  Surgeon: Cain Ortega MD;  Location: [address]);  Service: Endoscopy;  Laterality: N/A;    ESOPHAGOGASTRODUODENOSCOPY N/A 9/15/2022    Procedure: EGD (ESOPHAGOGASTRODUODENOSCOPY);  Surgeon: Leonid Chavez MD;  Location: [address]);  Service: Endoscopy;  Laterality: N/A;  PA-50 - 2nd floor  vacc-wears 2L o2-inst mail and verbal-clears 4 hrs prior-tb  8/30 pt rescheduled; updated instructions mailed-    LEFT HEART CATHETERIZATION  4/23/2020    Procedure: Left heart cath;  Surgeon: Nic Pollack MD;  Location: [facility] CATH LAB;  Service: Cardiology;;       Past Medical History:   Diagnosis Date    Aspiration pneumonia 5/30/2021    Asthma     Atrial fibrillation with rapid ventricular response     CHF (congestive heart failure)     COPD (chronic obstructive pulmonary disease)     home O2 at night only    Coronary artery disease     Hypertension     Lung nodule     Pneumonia     Seizures        Social History     Socioeconomic History    Marital status: Single   Tobacco Use    Smoking status: Some Days     Packs/day:  0.25     Types: Cigarettes    Smokeless tobacco: Never    Tobacco comments:     1-2 cigarettes per day   Substance and Sexual Activity    Alcohol use: Yes     Alcohol/week: 2.0 standard drinks     Types: 2 Cans of beer per week     Comment: every night    Drug use: No    Sexual activity: Not Currently   Social History Narrative    Patient' nephew is currently living with him in his apartment. Patient's sister Viry ([phone]) helps manage patient's care.            6/3/21    Patient is no longer staying with family. Patient is currently staying at the [facility] and working on getting into their program for more supportive housing.      Social Determinants of Health     Financial Resource Strain: Low Risk     Difficulty of Paying Living Expenses: Not hard at all   Food Insecurity: Unknown    Worried About Running Out of Food in the Last Year: Patient refused    Ran Out of Food in the Last Year: Patient refused   Transportation Needs: No Transportation Needs    Lack of Transportation (Medical): No    Lack of Transportation (Non-Medical): No   Physical Activity: Unknown    Days of Exercise per Week: Patient refused    Minutes of Exercise per Session: Patient refused   Stress: Stress Concern Present    Feeling of Stress : Rather much   Social Connections: Socially Isolated    Frequency of Communication with Friends and Family: More than three times a week    Frequency of Social Gatherings with Friends and Family: Twice a week    Attends [employer]ist Services: Never    Active Member of Clubs or Organizations: No    Attends Club or Organization Meetings: Patient refused    Marital Status: Never    Housing Stability: Low Risk     Unable to Pay for Housing in the Last Year: No    Number of Places Lived in the Last Year: 2    Unstable Housing in the Last Year: No       Review of Systems   Constitutional:  Negative for activity change and fatigue.   Musculoskeletal:  Positive for gait problem. Negative for joint  "swelling.   Hematological:  Bruises/bleeds easily.     Objective:   /60 (BP Location: Right arm, Patient Position: Sitting, BP Method: Medium (Manual))   Pulse 88   Temp 98.3 °F (36.8 °C) (Oral)   Ht 5' 6" (1.676 m)   Wt 61.8 kg (136 lb 5.7 oz)   SpO2 (!) 94%   BMI 22.01 kg/m²     Physical Exam  Constitutional:       Appearance: He is ill-appearing.      Comments: Cachectic appearance   Skin:     Findings: Bruising present.   Neurological:      Mental Status: He is alert.       Lab Results   Component Value Date    WBC 11.00 01/10/2023    HGB 11.8 (L) 01/10/2023    HCT 39.8 (L) 01/10/2023     01/10/2023    CHOL 260 (H) 04/19/2020    TRIG 145 04/19/2020    HDL 91 (H) 04/19/2020    ALT 14 01/08/2023    AST 20 01/08/2023     01/10/2023    K 4.1 01/10/2023    CL 97 01/10/2023    CREATININE 1.0 01/10/2023    BUN 17 01/10/2023    CO2 31 (H) 01/10/2023    TSH 1.137 12/05/2022    INR 1.1 12/08/2022    HGBA1C 5.7 (H) 02/10/2022       RESULTS: Reviewed labs and images today    Assessment:   62 y.o. male with multiple co-morbid illnesses here to continue work-up of chronic issues     Plan:     Problem List Items Addressed This Visit          Pulmonary    Chronic obstructive pulmonary disease (Chronic)    Relevant Medications    budesonide (PULMICORT) 0.5 mg/2 mL nebulizer solution    Pulmonary cachexia due to chronic obstructive pulmonary disease    Relevant Medications    albuterol-ipratropium 2.5 mg-0.5 mg/3 mL nebulizer solution 3 mL (Completed)    albuterol-ipratropium 2.5 mg-0.5 mg/3 mL nebulizer solution 3 mL (Completed)    budesonide (PULMICORT) 0.5 mg/2 mL nebulizer solution       GI    Epigastric pain - Primary    Relevant Medications    acetaminophen (TYLENOL) 500 MG tablet    acetaminophen tablet 650 mg (Completed)       Other    LIBRA (obstructive sleep apnea)     Requires CPAP mask & supplies. Mr Mccray and I called the [facility] during his appointment and they stated that he requires a " specialist appointment in order for him to get his mask supplies refilled.  Needs sleep medicine appt            Health Maintenance         Date Due Completion Date    TETANUS VACCINE Never done ---    Colorectal Cancer Screening Never done ---    Hemoglobin A1c (Prediabetes) 02/10/2023 2/10/2022    Shingles Vaccine (2 of 2) 03/15/2023 1/18/2023    High Dose Statin 01/25/2024 1/25/2023    Lipid Panel 04/19/2025 4/19/2020    Pneumococcal Vaccines (Age 0-64) (3 - PPSV23 if available, else PCV20) 12/19/2027 12/19/2022            Follow up in about 1 week (around 2/1/2023).  60min spent with patient today, issues addressed include: COPD    Delphine Orona MD/MPH  Internal Medicine  [facility]  [phone]

## 2023-01-27 ENCOUNTER — TELEPHONE (OUTPATIENT)
Dept: PRIMARY CARE CLINIC | Facility: CLINIC | Age: 63
End: 2023-01-27
Payer: MEDICARE

## 2023-01-27 NOTE — TELEPHONE ENCOUNTER
Isidra Orona MD  Replies will be sent to [facility]  Caller: Unspecified (Today,  9:27 AM)  1/27/23   - Scheduled dental consult at [address] [facility] on 2/8 at 2:15pm / needs lyft transport to appointment   - contacted [facility] to escalate case (requires mask replacement for bipap and threatening ED visit to access bipap)

## 2023-01-27 NOTE — TELEPHONE ENCOUNTER
"Please work with Mr Mccray on getting a dentist.     We have a box in our resource center for dentists, please find the community specialists there.    Also please find him a primary dentist with [employer] here:  [email or web address]    1. Coverage type  Select the type of coverage you have or are considering (E.g., DHMO/dental HMO, PPO, etc.).    2. ZIP code  Type in your ZIP code.    3. Network (or plan) name  Choose the name of your network plan. (Youll find the name of your network on your member ID card.)    4. Search by name or specialty  Enter the specialty "dentist"     Thanks,  Dr BURNS  "

## 2023-01-28 ENCOUNTER — HOSPITAL ENCOUNTER (OUTPATIENT)
Facility: HOSPITAL | Age: 63
Discharge: HOME HEALTH | End: 2023-01-30
Attending: EMERGENCY MEDICINE | Admitting: EMERGENCY MEDICINE
Payer: MEDICARE

## 2023-01-28 DIAGNOSIS — F17.210 CIGARETTE NICOTINE DEPENDENCE WITHOUT COMPLICATION: ICD-10-CM

## 2023-01-28 DIAGNOSIS — R07.9 CHEST PAIN: ICD-10-CM

## 2023-01-28 DIAGNOSIS — I50.32 CHRONIC DIASTOLIC CONGESTIVE HEART FAILURE: ICD-10-CM

## 2023-01-28 DIAGNOSIS — R06.02 SHORTNESS OF BREATH: ICD-10-CM

## 2023-01-28 DIAGNOSIS — J41.0 SMOKERS' COUGH: ICD-10-CM

## 2023-01-28 DIAGNOSIS — I50.33 ACUTE ON CHRONIC DIASTOLIC CONGESTIVE HEART FAILURE: Primary | ICD-10-CM

## 2023-01-28 LAB
ALBUMIN SERPL BCP-MCNC: 3.2 G/DL (ref 3.5–5.2)
ALP SERPL-CCNC: 77 U/L (ref 55–135)
ALT SERPL W/O P-5'-P-CCNC: 9 U/L (ref 10–44)
ANION GAP SERPL CALC-SCNC: 10 MMOL/L (ref 8–16)
AST SERPL-CCNC: 15 U/L (ref 10–40)
BASOPHILS # BLD AUTO: 0.06 K/UL (ref 0–0.2)
BASOPHILS NFR BLD: 0.8 % (ref 0–1.9)
BILIRUB SERPL-MCNC: 0.3 MG/DL (ref 0.1–1)
BNP SERPL-MCNC: 22 PG/ML (ref 0–99)
BUN SERPL-MCNC: 8 MG/DL (ref 6–20)
CALCIUM SERPL-MCNC: 9.2 MG/DL (ref 8.7–10.5)
CHLORIDE SERPL-SCNC: 98 MMOL/L (ref 95–110)
CO2 SERPL-SCNC: 31 MMOL/L (ref 23–29)
CREAT SERPL-MCNC: 0.8 MG/DL (ref 0.5–1.4)
DIFFERENTIAL METHOD: ABNORMAL
EOSINOPHIL # BLD AUTO: 0.3 K/UL (ref 0–0.5)
EOSINOPHIL NFR BLD: 4.6 % (ref 0–8)
ERYTHROCYTE [DISTWIDTH] IN BLOOD BY AUTOMATED COUNT: 17.3 % (ref 11.5–14.5)
EST. GFR  (NO RACE VARIABLE): >60 ML/MIN/1.73 M^2
GLUCOSE SERPL-MCNC: 106 MG/DL (ref 70–110)
HCT VFR BLD AUTO: 39.2 % (ref 40–54)
HGB BLD-MCNC: 11.2 G/DL (ref 14–18)
IMM GRANULOCYTES # BLD AUTO: 0.02 K/UL (ref 0–0.04)
IMM GRANULOCYTES NFR BLD AUTO: 0.3 % (ref 0–0.5)
INFLUENZA A, MOLECULAR: NOT DETECTED
INFLUENZA B, MOLECULAR: NOT DETECTED
LACTATE SERPL-SCNC: 0.6 MMOL/L (ref 0.5–2.2)
LYMPHOCYTES # BLD AUTO: 1.8 K/UL (ref 1–4.8)
LYMPHOCYTES NFR BLD: 23.7 % (ref 18–48)
MCH RBC QN AUTO: 24.5 PG (ref 27–31)
MCHC RBC AUTO-ENTMCNC: 28.6 G/DL (ref 32–36)
MCV RBC AUTO: 86 FL (ref 82–98)
MONOCYTES # BLD AUTO: 0.5 K/UL (ref 0.3–1)
MONOCYTES NFR BLD: 7.1 % (ref 4–15)
NEUTROPHILS # BLD AUTO: 4.7 K/UL (ref 1.8–7.7)
NEUTROPHILS NFR BLD: 63.5 % (ref 38–73)
NRBC BLD-RTO: 0 /100 WBC
PLATELET # BLD AUTO: 347 K/UL (ref 150–450)
PMV BLD AUTO: 10 FL (ref 9.2–12.9)
POTASSIUM SERPL-SCNC: 3.8 MMOL/L (ref 3.5–5.1)
PROCALCITONIN SERPL IA-MCNC: 0.05 NG/ML
PROT SERPL-MCNC: 7.4 G/DL (ref 6–8.4)
RBC # BLD AUTO: 4.58 M/UL (ref 4.6–6.2)
RSV AG BY MOLECULAR METHOD: NOT DETECTED
SARS-COV-2 RNA RESP QL NAA+PROBE: NOT DETECTED
SODIUM SERPL-SCNC: 139 MMOL/L (ref 136–145)
TROPONIN I SERPL DL<=0.01 NG/ML-MCNC: 0.02 NG/ML (ref 0–0.03)
WBC # BLD AUTO: 7.44 K/UL (ref 3.9–12.7)

## 2023-01-28 PROCEDURE — 25000003 PHARM REV CODE 250

## 2023-01-28 PROCEDURE — 94761 N-INVAS EAR/PLS OXIMETRY MLT: CPT

## 2023-01-28 PROCEDURE — 99285 EMERGENCY DEPT VISIT HI MDM: CPT | Mod: CS,,, | Performed by: EMERGENCY MEDICINE

## 2023-01-28 PROCEDURE — 93005 ELECTROCARDIOGRAM TRACING: CPT

## 2023-01-28 PROCEDURE — 84145 PROCALCITONIN (PCT): CPT

## 2023-01-28 PROCEDURE — 99285 PR EMERGENCY DEPT VISIT,LEVEL V: ICD-10-PCS | Mod: CS,,, | Performed by: EMERGENCY MEDICINE

## 2023-01-28 PROCEDURE — 99223 1ST HOSP IP/OBS HIGH 75: CPT | Mod: ,,,

## 2023-01-28 PROCEDURE — 93010 EKG 12-LEAD: ICD-10-PCS | Mod: ,,, | Performed by: INTERNAL MEDICINE

## 2023-01-28 PROCEDURE — 96374 THER/PROPH/DIAG INJ IV PUSH: CPT

## 2023-01-28 PROCEDURE — 94640 AIRWAY INHALATION TREATMENT: CPT | Mod: XB

## 2023-01-28 PROCEDURE — G0378 HOSPITAL OBSERVATION PER HR: HCPCS

## 2023-01-28 PROCEDURE — 27000221 HC OXYGEN, UP TO 24 HOURS

## 2023-01-28 PROCEDURE — 99900035 HC TECH TIME PER 15 MIN (STAT)

## 2023-01-28 PROCEDURE — 83880 ASSAY OF NATRIURETIC PEPTIDE: CPT | Performed by: STUDENT IN AN ORGANIZED HEALTH CARE EDUCATION/TRAINING PROGRAM

## 2023-01-28 PROCEDURE — 25000242 PHARM REV CODE 250 ALT 637 W/ HCPCS

## 2023-01-28 PROCEDURE — 85025 COMPLETE CBC W/AUTO DIFF WBC: CPT | Performed by: STUDENT IN AN ORGANIZED HEALTH CARE EDUCATION/TRAINING PROGRAM

## 2023-01-28 PROCEDURE — 83605 ASSAY OF LACTIC ACID: CPT

## 2023-01-28 PROCEDURE — 84484 ASSAY OF TROPONIN QUANT: CPT | Performed by: STUDENT IN AN ORGANIZED HEALTH CARE EDUCATION/TRAINING PROGRAM

## 2023-01-28 PROCEDURE — 99223 PR INITIAL HOSPITAL CARE,LEVL III: ICD-10-PCS | Mod: ,,,

## 2023-01-28 PROCEDURE — 0241U SARS-COV2 (COVID) WITH FLU/RSV BY PCR: CPT | Performed by: STUDENT IN AN ORGANIZED HEALTH CARE EDUCATION/TRAINING PROGRAM

## 2023-01-28 PROCEDURE — 99285 EMERGENCY DEPT VISIT HI MDM: CPT | Mod: 25

## 2023-01-28 PROCEDURE — 93010 ELECTROCARDIOGRAM REPORT: CPT | Mod: ,,, | Performed by: INTERNAL MEDICINE

## 2023-01-28 PROCEDURE — 63600175 PHARM REV CODE 636 W HCPCS: Performed by: STUDENT IN AN ORGANIZED HEALTH CARE EDUCATION/TRAINING PROGRAM

## 2023-01-28 PROCEDURE — 25000242 PHARM REV CODE 250 ALT 637 W/ HCPCS: Performed by: STUDENT IN AN ORGANIZED HEALTH CARE EDUCATION/TRAINING PROGRAM

## 2023-01-28 PROCEDURE — 94640 AIRWAY INHALATION TREATMENT: CPT

## 2023-01-28 PROCEDURE — 94660 CPAP INITIATION&MGMT: CPT

## 2023-01-28 PROCEDURE — S4991 NICOTINE PATCH NONLEGEND: HCPCS

## 2023-01-28 PROCEDURE — 80053 COMPREHEN METABOLIC PANEL: CPT | Performed by: STUDENT IN AN ORGANIZED HEALTH CARE EDUCATION/TRAINING PROGRAM

## 2023-01-28 PROCEDURE — 27000190 HC CPAP FULL FACE MASK W/VALVE

## 2023-01-28 RX ORDER — SIMETHICONE 80 MG
1 TABLET,CHEWABLE ORAL 4 TIMES DAILY PRN
Status: DISCONTINUED | OUTPATIENT
Start: 2023-01-28 | End: 2023-01-30 | Stop reason: HOSPADM

## 2023-01-28 RX ORDER — FOLIC ACID 1 MG/1
1 TABLET ORAL DAILY
Status: DISCONTINUED | OUTPATIENT
Start: 2023-01-28 | End: 2023-01-30 | Stop reason: HOSPADM

## 2023-01-28 RX ORDER — TALC
6 POWDER (GRAM) TOPICAL NIGHTLY PRN
Status: DISCONTINUED | OUTPATIENT
Start: 2023-01-28 | End: 2023-01-28

## 2023-01-28 RX ORDER — GLUCAGON 1 MG
1 KIT INJECTION
Status: DISCONTINUED | OUTPATIENT
Start: 2023-01-28 | End: 2023-01-30 | Stop reason: HOSPADM

## 2023-01-28 RX ORDER — FUROSEMIDE 10 MG/ML
40 INJECTION INTRAMUSCULAR; INTRAVENOUS
Status: COMPLETED | OUTPATIENT
Start: 2023-01-28 | End: 2023-01-28

## 2023-01-28 RX ORDER — IBUPROFEN 200 MG
1 TABLET ORAL DAILY
Status: DISCONTINUED | OUTPATIENT
Start: 2023-01-28 | End: 2023-01-30 | Stop reason: HOSPADM

## 2023-01-28 RX ORDER — ATORVASTATIN CALCIUM 40 MG/1
40 TABLET, FILM COATED ORAL DAILY
Status: DISCONTINUED | OUTPATIENT
Start: 2023-01-28 | End: 2023-01-30 | Stop reason: HOSPADM

## 2023-01-28 RX ORDER — FUROSEMIDE 20 MG/1
20 TABLET ORAL DAILY
Status: DISCONTINUED | OUTPATIENT
Start: 2023-01-28 | End: 2023-01-30 | Stop reason: HOSPADM

## 2023-01-28 RX ORDER — NALOXONE HCL 0.4 MG/ML
0.02 VIAL (ML) INJECTION
Status: DISCONTINUED | OUTPATIENT
Start: 2023-01-28 | End: 2023-01-30 | Stop reason: HOSPADM

## 2023-01-28 RX ORDER — SODIUM CHLORIDE 0.9 % (FLUSH) 0.9 %
10 SYRINGE (ML) INJECTION
Status: DISCONTINUED | OUTPATIENT
Start: 2023-01-28 | End: 2023-01-30 | Stop reason: HOSPADM

## 2023-01-28 RX ORDER — LANOLIN ALCOHOL/MO/W.PET/CERES
1 CREAM (GRAM) TOPICAL DAILY
Status: DISCONTINUED | OUTPATIENT
Start: 2023-01-28 | End: 2023-01-30 | Stop reason: HOSPADM

## 2023-01-28 RX ORDER — IBUPROFEN 200 MG
16 TABLET ORAL
Status: DISCONTINUED | OUTPATIENT
Start: 2023-01-28 | End: 2023-01-30 | Stop reason: HOSPADM

## 2023-01-28 RX ORDER — LOSARTAN POTASSIUM 25 MG/1
25 TABLET ORAL DAILY
Status: DISCONTINUED | OUTPATIENT
Start: 2023-01-28 | End: 2023-01-28

## 2023-01-28 RX ORDER — THIAMINE HCL 100 MG
100 TABLET ORAL DAILY
Status: DISCONTINUED | OUTPATIENT
Start: 2023-01-28 | End: 2023-01-30 | Stop reason: HOSPADM

## 2023-01-28 RX ORDER — ENOXAPARIN SODIUM 100 MG/ML
40 INJECTION SUBCUTANEOUS EVERY 24 HOURS
Status: DISCONTINUED | OUTPATIENT
Start: 2023-01-28 | End: 2023-01-28

## 2023-01-28 RX ORDER — PROCHLORPERAZINE EDISYLATE 5 MG/ML
5 INJECTION INTRAMUSCULAR; INTRAVENOUS EVERY 6 HOURS PRN
Status: DISCONTINUED | OUTPATIENT
Start: 2023-01-28 | End: 2023-01-30 | Stop reason: HOSPADM

## 2023-01-28 RX ORDER — TALC
6 POWDER (GRAM) TOPICAL NIGHTLY PRN
Status: DISCONTINUED | OUTPATIENT
Start: 2023-01-28 | End: 2023-01-30 | Stop reason: HOSPADM

## 2023-01-28 RX ORDER — LOSARTAN POTASSIUM 25 MG/1
25 TABLET ORAL ONCE
Status: COMPLETED | OUTPATIENT
Start: 2023-01-28 | End: 2023-01-28

## 2023-01-28 RX ORDER — BUDESONIDE 0.5 MG/2ML
0.5 INHALANT ORAL 2 TIMES DAILY
Status: DISCONTINUED | OUTPATIENT
Start: 2023-01-28 | End: 2023-01-30 | Stop reason: HOSPADM

## 2023-01-28 RX ORDER — BISACODYL 10 MG
10 SUPPOSITORY, RECTAL RECTAL DAILY PRN
Status: DISCONTINUED | OUTPATIENT
Start: 2023-01-28 | End: 2023-01-30 | Stop reason: HOSPADM

## 2023-01-28 RX ORDER — LOSARTAN POTASSIUM 50 MG/1
50 TABLET ORAL DAILY
Status: DISCONTINUED | OUTPATIENT
Start: 2023-01-29 | End: 2023-01-30 | Stop reason: HOSPADM

## 2023-01-28 RX ORDER — POLYETHYLENE GLYCOL 3350 17 G/17G
17 POWDER, FOR SOLUTION ORAL 2 TIMES DAILY PRN
Status: DISCONTINUED | OUTPATIENT
Start: 2023-01-28 | End: 2023-01-30 | Stop reason: HOSPADM

## 2023-01-28 RX ORDER — IPRATROPIUM BROMIDE AND ALBUTEROL SULFATE 2.5; .5 MG/3ML; MG/3ML
3 SOLUTION RESPIRATORY (INHALATION)
Status: DISCONTINUED | OUTPATIENT
Start: 2023-01-28 | End: 2023-01-29

## 2023-01-28 RX ORDER — SODIUM CHLORIDE 0.9 % (FLUSH) 0.9 %
10 SYRINGE (ML) INJECTION EVERY 12 HOURS PRN
Status: DISCONTINUED | OUTPATIENT
Start: 2023-01-28 | End: 2023-01-30 | Stop reason: HOSPADM

## 2023-01-28 RX ORDER — DILTIAZEM HYDROCHLORIDE 60 MG/1
180 TABLET, FILM COATED ORAL DAILY
Status: DISCONTINUED | OUTPATIENT
Start: 2023-01-28 | End: 2023-01-30 | Stop reason: HOSPADM

## 2023-01-28 RX ORDER — MAG HYDROX/ALUMINUM HYD/SIMETH 200-200-20
30 SUSPENSION, ORAL (FINAL DOSE FORM) ORAL 4 TIMES DAILY PRN
Status: DISCONTINUED | OUTPATIENT
Start: 2023-01-28 | End: 2023-01-30 | Stop reason: HOSPADM

## 2023-01-28 RX ORDER — ONDANSETRON 8 MG/1
8 TABLET, ORALLY DISINTEGRATING ORAL EVERY 8 HOURS PRN
Status: DISCONTINUED | OUTPATIENT
Start: 2023-01-28 | End: 2023-01-30 | Stop reason: HOSPADM

## 2023-01-28 RX ORDER — IPRATROPIUM BROMIDE AND ALBUTEROL SULFATE 2.5; .5 MG/3ML; MG/3ML
3 SOLUTION RESPIRATORY (INHALATION)
Status: DISCONTINUED | OUTPATIENT
Start: 2023-01-28 | End: 2023-01-28

## 2023-01-28 RX ORDER — IPRATROPIUM BROMIDE AND ALBUTEROL SULFATE 2.5; .5 MG/3ML; MG/3ML
3 SOLUTION RESPIRATORY (INHALATION) EVERY 4 HOURS PRN
Status: DISCONTINUED | OUTPATIENT
Start: 2023-01-28 | End: 2023-01-30 | Stop reason: HOSPADM

## 2023-01-28 RX ORDER — PANTOPRAZOLE SODIUM 40 MG/1
40 TABLET, DELAYED RELEASE ORAL DAILY
Status: DISCONTINUED | OUTPATIENT
Start: 2023-01-28 | End: 2023-01-30 | Stop reason: HOSPADM

## 2023-01-28 RX ORDER — ACETAMINOPHEN 325 MG/1
650 TABLET ORAL EVERY 4 HOURS PRN
Status: DISCONTINUED | OUTPATIENT
Start: 2023-01-28 | End: 2023-01-30 | Stop reason: HOSPADM

## 2023-01-28 RX ORDER — IBUPROFEN 200 MG
24 TABLET ORAL
Status: DISCONTINUED | OUTPATIENT
Start: 2023-01-28 | End: 2023-01-30 | Stop reason: HOSPADM

## 2023-01-28 RX ADMIN — APIXABAN 5 MG: 5 TABLET, FILM COATED ORAL at 08:01

## 2023-01-28 RX ADMIN — DILTIAZEM HYDROCHLORIDE 180 MG: 60 TABLET, FILM COATED ORAL at 01:01

## 2023-01-28 RX ADMIN — IPRATROPIUM BROMIDE AND ALBUTEROL SULFATE 3 ML: .5; 3 SOLUTION RESPIRATORY (INHALATION) at 04:01

## 2023-01-28 RX ADMIN — ACETAMINOPHEN 650 MG: 325 TABLET ORAL at 01:01

## 2023-01-28 RX ADMIN — LOSARTAN POTASSIUM 25 MG: 25 TABLET, FILM COATED ORAL at 01:01

## 2023-01-28 RX ADMIN — FUROSEMIDE 20 MG: 20 TABLET ORAL at 01:01

## 2023-01-28 RX ADMIN — ACETAMINOPHEN 650 MG: 325 TABLET ORAL at 11:01

## 2023-01-28 RX ADMIN — THIAMINE HCL TAB 100 MG 100 MG: 100 TAB at 01:01

## 2023-01-28 RX ADMIN — FERROUS SULFATE TAB 325 MG (65 MG ELEMENTAL FE) 1 EACH: 325 (65 FE) TAB at 01:01

## 2023-01-28 RX ADMIN — FUROSEMIDE 40 MG: 10 INJECTION, SOLUTION INTRAMUSCULAR; INTRAVENOUS at 06:01

## 2023-01-28 RX ADMIN — IPRATROPIUM BROMIDE AND ALBUTEROL SULFATE 3 ML: .5; 3 SOLUTION RESPIRATORY (INHALATION) at 07:01

## 2023-01-28 RX ADMIN — SALMETEROL XINAFOATE 1 PUFF: 50 POWDER, METERED ORAL; RESPIRATORY (INHALATION) at 08:01

## 2023-01-28 RX ADMIN — Medication 1 PATCH: at 01:01

## 2023-01-28 RX ADMIN — FOLIC ACID 1 MG: 1 TABLET ORAL at 01:01

## 2023-01-28 RX ADMIN — ATORVASTATIN CALCIUM 40 MG: 40 TABLET, FILM COATED ORAL at 01:01

## 2023-01-28 RX ADMIN — BUDESONIDE 0.5 MG: 0.5 INHALANT ORAL at 08:01

## 2023-01-28 RX ADMIN — PANTOPRAZOLE SODIUM 40 MG: 40 TABLET, DELAYED RELEASE ORAL at 01:01

## 2023-01-28 RX ADMIN — IPRATROPIUM BROMIDE AND ALBUTEROL SULFATE 3 ML: .5; 3 SOLUTION RESPIRATORY (INHALATION) at 01:01

## 2023-01-28 RX ADMIN — APIXABAN 5 MG: 5 TABLET, FILM COATED ORAL at 01:01

## 2023-01-28 NOTE — ASSESSMENT & PLAN NOTE
Patient with Long standing persistent (>12 months) atrial fibrillation which is controlled currently with Calcium Channel Blocker. Patient is currently in sinus rhythm.[id number] Score: 1.  Anticoagulation indicated. Anticoagulation done with eliquis.  - continue home diltiazem 180 mg daily

## 2023-01-28 NOTE — ASSESSMENT & PLAN NOTE
- elevated on admit-  - continue home diltiazem 180 mg daily, increase losartan from 25 mg to 50 mg daily  - cardiac diet

## 2023-01-28 NOTE — ED PROVIDER NOTES
Encounter Date: 1/28/2023       History     Chief Complaint   Patient presents with    Shortness of Breath     X 3 days,sating 71% on RA, placed on 2L O2 by EMS with improvement to 94%. Hx COPD and asthma     Patient is a 62-year-old male with a past medical history of hypertension, CAD, AFib on Eliquis, COPD on 2 L home O2 and HFpEF presenting to the ED for shortness of breath.  He reports that he primarily came because he has not been able to get a CPAP mask for the past 2 weeks.  Because of this, he states that he is more short of breath even on his 2 L of oxygen during the day.  He denies chest pain, fevers/chills, abdominal pain or nausea/vomiting/diarrhea.    Review of patient's allergies indicates:   Allergen Reactions    Benazepril Swelling     Past Medical History:   Diagnosis Date    Aspiration pneumonia 5/30/2021    Asthma     Atrial fibrillation with rapid ventricular response     CHF (congestive heart failure)     COPD (chronic obstructive pulmonary disease)     home O2 at night only    Coronary artery disease     Hypertension     Lung nodule     Pneumonia     Seizures      Past Surgical History:   Procedure Laterality Date    ESOPHAGOGASTRODUODENOSCOPY N/A 2/11/2022    Procedure: EGD (ESOPHAGOGASTRODUODENOSCOPY);  Surgeon: Cain Ortega MD;  Location: [address]);  Service: Endoscopy;  Laterality: N/A;    ESOPHAGOGASTRODUODENOSCOPY N/A 9/15/2022    Procedure: EGD (ESOPHAGOGASTRODUODENOSCOPY);  Surgeon: Leonid Chavez MD;  Location: [address]);  Service: Endoscopy;  Laterality: N/A;  PA-50 - 2nd floor  vacc-wears 2L o2-inst mail and verbal-clears 4 hrs prior-tb  8/30 pt rescheduled; updated instructions mailed-st    LEFT HEART CATHETERIZATION  4/23/2020    Procedure: Left heart cath;  Surgeon: Nic Pollack MD;  Location: [facility] CATH LAB;  Service: Cardiology;;     Family History   Problem Relation Age of Onset    Cancer Father     Hypertension Sister     Stroke Sister     Stroke  Brother     Diabetes Neg Hx     Heart disease Neg Hx      Social History     Tobacco Use    Smoking status: Some Days     Packs/day: 0.25     Types: Cigarettes    Smokeless tobacco: Never    Tobacco comments:     1-2 cigarettes per day   Substance Use Topics    Alcohol use: Yes     Alcohol/week: 2.0 standard drinks     Types: 2 Cans of beer per week     Comment: every night    Drug use: No     Review of Systems   Constitutional:  Negative for activity change, chills and fever.   HENT:  Negative for congestion, ear pain and sore throat.    Respiratory:  Positive for shortness of breath. Negative for stridor.    Cardiovascular:  Negative for chest pain and palpitations.   Gastrointestinal:  Negative for abdominal pain, nausea and vomiting.   Genitourinary:  Negative for dysuria and urgency.   Musculoskeletal:  Negative for back pain.   Skin:  Negative for rash.   Neurological:  Negative for dizziness, syncope, weakness and headaches.   Hematological:  Does not bruise/bleed easily.     Physical Exam     Initial Vitals [01/28/23 0358]   BP Pulse Resp Temp SpO2   (!) 164/90 95 20 98.8 °F (37.1 °C) 96 %      MAP       --         Physical Exam    Nursing note and vitals reviewed.  Constitutional: He appears well-developed and well-nourished. He is not diaphoretic. No distress. Nasal cannula in place.   Unkempt.  Speaking full sentences.  No acute distress.   HENT:   Head: Normocephalic and atraumatic.   Right Ear: External ear normal.   Left Ear: External ear normal.   Neck: Neck supple.   Cardiovascular:  Normal rate, regular rhythm, normal heart sounds and intact distal pulses.           Pulmonary/Chest: No respiratory distress. He has no wheezes. He has no rhonchi. He has rales.   Bibasilar rales.  There is no wheezing present   Abdominal: Abdomen is soft. He exhibits no distension. There is no abdominal tenderness. There is no rebound and no guarding.   Musculoskeletal:      Cervical back: Neck supple.      Neurological: He is alert and oriented to person, place, and time. GCS score is 15. GCS eye subscore is 4. GCS verbal subscore is 5. GCS motor subscore is 6.   Skin: Skin is warm. Capillary refill takes less than 2 seconds. No rash noted.   Psychiatric: He has a normal mood and affect.       ED Course   Procedures  Labs Reviewed   CBC W/ AUTO DIFFERENTIAL - Abnormal; Notable for the following components:       Result Value    RBC 4.58 (*)     Hemoglobin 11.2 (*)     Hematocrit 39.2 (*)     MCH 24.5 (*)     MCHC 28.6 (*)     RDW 17.3 (*)     All other components within normal limits   COMPREHENSIVE METABOLIC PANEL - Abnormal; Notable for the following components:    CO2 31 (*)     Albumin 3.2 (*)     ALT 9 (*)     All other components within normal limits   TROPONIN I   B-TYPE NATRIURETIC PEPTIDE   SARS-COV2 (COVID) WITH FLU/RSV BY PCR     EKG Readings: (Independently Interpreted)   Initial Reading: No STEMI. Previous EKG: Compared with most recent EKG Previous EKG Date: 01/08/2023. Rhythm: Normal Sinus Rhythm. Heart Rate: 94. Ectopy: No Ectopy. Conduction: Normal.     Imaging Results              X-Ray Chest AP Portable (Final result)  Result time 01/28/23 04:30:49      Final result by Haja He MD (01/28/23 04:30:49)                   Impression:      Radiographic findings as above.      Electronically signed by: Haja He  Date:    01/28/2023  Time:    04:30               Narrative:    EXAMINATION:  XR CHEST AP PORTABLE    CLINICAL HISTORY:  Shortness of breath    TECHNIQUE:  Single frontal view of the chest was performed.    COMPARISON:  Prior examinations, most recent of which is January 8, 2023    FINDINGS:  Single portable chest view is submitted.  The cardiomediastinal silhouette appears stable.  Aortic atherosclerotic change noted.  There is prominence of the pulmonary vascular however this appears similar to prior examinations.  Linear density at the mid right hemithorax may relate to an  area of scarring possible mild atelectatic change.  Overall appearance of the pulmonary bronchovascular markings is stable when compared to prior examinations, there is no evidence for superimposed confluent infiltrate or consolidation, significant pleural effusion or pneumothorax.  The osseous structures demonstrate chronic change.                                    X-Rays:   Independently Interpreted Readings:   Chest X-Ray: Cardiomegaly present.  Increased vascular markings consistent with CHF are present.   Medications   furosemide injection 40 mg (40 mg Intravenous Given 1/28/23 0615)     Medical Decision Making:   History:   I obtained history from: someone other than patient.  Old Medical Records: I decided to obtain old medical records.  Initial Assessment:   Emergent evaluation of shortness of breath/concern that he has not been able to get his CPAP mask.  He is afebrile and hemodynamically stable.  Differential Diagnosis:   ACS unlikely, COPD exacerbation, CHF exacerbation, pneumothorax less likely  Clinical Tests:   Lab Tests: Ordered and Reviewed  Radiological Study: Ordered and Reviewed  Medical Tests: Reviewed and Ordered  ED Management:  Labs are largely unremarkable.  Chest x-ray appears similar to prior.  Upon ambulatory pulse ox, it was noted that he desaturated to 85% on his home O2 of 2 L. will plan for observation to Hospital Medicine for the hypoxia.                        Clinical Impression:   Final diagnoses:  [R06.02] Shortness of breath  [I50.33] Acute on chronic diastolic congestive heart failure (Primary)        ED Disposition Condition    Observation Stable                Jerry Mcintyre MD  Resident  01/28/23 2537

## 2023-01-28 NOTE — PLAN OF CARE
01/28/23 1742   Post-Acute Status   Post-Acute Authorization Other   Other Status No Post-Acute Service Needs   Discharge Delays None known at this time   Discharge Plan   Discharge Plan A Home   Discharge Plan B Home Health

## 2023-01-28 NOTE — ASSESSMENT & PLAN NOTE
- Pt reports intermittent use of cigarettes   - Dangers of cigarette smoking (especially given concurrent oxygen use) were reviewed in detail for ~12 minutes and pt was encouraged to quit.  - Nicotine replacement options discussed and ordered  - Plan to provide nicotine replacement at discharge

## 2023-01-28 NOTE — ED NOTES
Report called to [facility] on 11th floor -------------------------------------------------------going to room 112

## 2023-01-28 NOTE — H&P
[facility] - Emergency Dept  [facility] Medicine  History & Physical    Patient Name: Baltazar Mccray  MRN: [id number]  Patient Class: OP- Observation  Admission Date: 1/28/2023  Attending Physician: Marilee Jackson MD   Primary Care Provider: Delphine Orona MD         Patient information was obtained from patient, past medical records and ER records.     Subjective:     Principal Problem:Acute on chronic respiratory failure with hypoxia and hypercapnia    Chief Complaint:   Chief Complaint   Patient presents with    Shortness of Breath     X 3 days,sating 71% on RA, placed on 2L O2 by EMS with improvement to 94%. Hx COPD and asthma        HPI: Baltazar Mccray is 62 y.o. M with A fib on Eliquis, HFpEF, CAD, HTN, HLD, GERD, COPD (on 2 L O2 at home), and seizures presenting to the ED for shortness of breath, which he reports started 2 weeks ago when he had issues with him home bipap. Pt reports he is on bipap at home but does not have a mask or filters. He has attempted to contact the supplies company without success. Since that time, he reports becoming more short of breath. He denies worsening cough or increased sputum production. He is complaint with home medications and home oxygen. He reports his oxygen levels at home have been >90% on 2L. He is able to use home O2 between 2-5L but has not increased his O2 for symptomatic support. Pt denies fever, chills, chest pain, palpitations, recent travel, leg pain or swelling, abdominal pain, N/V/D, dysuria, confusion, HA, syncope or recent sick contacts.        In the ED: Hypertensive to 175/94, sating 85% on home 2L O2, afebrile. CBC with stable anemia, CMP wnl. BNP 22. Troponin 0.019. EKG without acute changes. CXR without acute process. Patient given lasix 40 mg IV X 1 and duonebs and admitted to observation.       Past Medical History:   Diagnosis Date    Aspiration pneumonia 5/30/2021    Asthma     Atrial fibrillation with rapid ventricular response     CHF  (congestive heart failure)     COPD (chronic obstructive pulmonary disease)     home O2 at night only    Coronary artery disease     Hypertension     Lung nodule     Pneumonia     Seizures        Past Surgical History:   Procedure Laterality Date    ESOPHAGOGASTRODUODENOSCOPY N/A 2/11/2022    Procedure: EGD (ESOPHAGOGASTRODUODENOSCOPY);  Surgeon: Cain Ortega MD;  Location: [facility] ENDO (2ND FLR);  Service: Endoscopy;  Laterality: N/A;    ESOPHAGOGASTRODUODENOSCOPY N/A 9/15/2022    Procedure: EGD (ESOPHAGOGASTRODUODENOSCOPY);  Surgeon: Leonid Chavez MD;  Location: [facility] ENDO (2ND FLR);  Service: Endoscopy;  Laterality: N/A;  PA-50 - 2nd floor  vacc-wears 2L o2-inst mail and verbal-clears 4 hrs prior-tb  8/30 pt rescheduled; updated instructions mailed-st    LEFT HEART CATHETERIZATION  4/23/2020    Procedure: Left heart cath;  Surgeon: Nic Pollack MD;  Location: [facility] CATH LAB;  Service: Cardiology;;       Review of patient's allergies indicates:   Allergen Reactions    Benazepril Swelling       No current facility-administered medications on file prior to encounter.     Current Outpatient Medications on File Prior to Encounter   Medication Sig    acetaminophen (TYLENOL) 500 MG tablet Take 1 tablet (500 mg total) by mouth every 6 (six) hours as needed for Pain.    albuterol (PROVENTIL/VENTOLIN HFA) 90 mcg/actuation inhaler Inhale 1-2 puffs into the lungs every 6 (six) hours as needed for Wheezing. Rescue    albuterol-ipratropium (DUO-NEB) 2.5 mg-0.5 mg/3 mL nebulizer solution Use 1 vial (3 mLs) by nebulization every 4 (four) hours as needed for Wheezing or Shortness of Breath. Rescue    apixaban (ELIQUIS) 5 mg Tab Take 1 tablet (5 mg total) by mouth 2 (two) times daily.    atorvastatin (LIPITOR) 40 MG tablet Take 1 tablet (40 mg total) by mouth once daily.    bismuth subsalicylate (PEPTO BISMOL) 262 mg/15 mL suspension Take 30 mLs by mouth daily as needed for Indigestion.    budesonide  (PULMICORT) 0.5 mg/2 mL nebulizer solution Take 2 mLs (0.5 mg total) by nebulization 2 (two) times a day. Controller    calcium carbonate (TUMS ORAL) Take 2 tablets by mouth daily as needed (Heartburn).    diltiaZEM (CARDIZEM) 90 MG tablet Take 2 tablets (180 mg total) by mouth once daily.    ferrous sulfate 325 (65 FE) MG EC tablet Take 1 tablet (325 mg total) by mouth once daily.    folic acid (FOLVITE) 1 MG tablet Take 1 tablet (1 mg total) by mouth once daily.    losartan (COZAAR) 25 MG tablet Take 1 tablet (25 mg total) by mouth once daily.    multivit-min-FA-lycopen-lutein (CERTAVITE SENIOR) 0.4 mg-300 mcg- 250 mcg Tab Take 1 tablet by mouth once daily.    pantoprazole (PROTONIX) 40 MG tablet Take 1 tablet (40 mg total) by mouth once daily.    thiamine 100 MG tablet Take 1 tablet (100 mg total) by mouth once daily.    inhalat.spacing dev,large mask (COMPACT SPACE CHAMBER-LRG MASK) Spcr Use as directed    [DISCONTINUED] arformoteroL (BROVANA) 15 mcg/2 mL Nebu Take 2 mLs (15 mcg total) by nebulization 2 (two) times daily. Controller    [DISCONTINUED] furosemide (LASIX) 20 MG tablet Take 1 tablet (20 mg total) by mouth once daily.    [DISCONTINUED] nicotine (NICODERM CQ) 21 mg/24 hr Place 1 patch onto the skin once daily.    [DISCONTINUED] tiotropium bromide (SPIRIVA RESPIMAT) 2.5 mcg/actuation inhaler Inhale 2 puffs into the lungs Daily. Controller     Family History       Problem Relation (Age of Onset)    Cancer Father    Hypertension Sister    Stroke Sister, Brother          Tobacco Use    Smoking status: Some Days     Packs/day: 0.25     Types: Cigarettes    Smokeless tobacco: Never    Tobacco comments:     1-2 cigarettes per day   Substance and Sexual Activity    Alcohol use: Yes     Alcohol/week: 2.0 standard drinks     Types: 2 Cans of beer per week     Comment: every night    Drug use: No    Sexual activity: Not Currently     Review of Systems   Constitutional:  Positive for appetite  change. Negative for activity change, chills, diaphoresis and fever.   HENT:  Negative for trouble swallowing.    Eyes:  Negative for photophobia and visual disturbance.   Respiratory:  Positive for shortness of breath. Negative for chest tightness and wheezing.    Cardiovascular:  Negative for chest pain, palpitations and leg swelling.   Gastrointestinal:  Negative for abdominal pain, constipation, diarrhea, nausea and vomiting.   Genitourinary:  Negative for dysuria, frequency, hematuria and urgency.   Musculoskeletal:  Negative for arthralgias and gait problem.   Skin:  Negative for color change and rash.   Neurological:  Negative for dizziness, syncope, weakness, light-headedness, numbness and headaches.   Hematological:  Bruises/bleeds easily.   Psychiatric/Behavioral:  Negative for agitation and confusion. The patient is not nervous/anxious.    Objective:     Vital Signs (Most Recent):  Temp: 98.3 °F (36.8 °C) (01/28/23 0734)  Pulse: 88 (01/28/23 1016)  Resp: 16 (01/28/23 1016)  BP: (!) 164/82 (01/28/23 1016)  SpO2: 99 % (01/28/23 1016)   Vital Signs (24h Range):  Temp:  [98.3 °F (36.8 °C)-98.8 °F (37.1 °C)] 98.3 °F (36.8 °C)  Pulse:  [] 88  Resp:  [16-20] 16  SpO2:  [85 %-99 %] 99 %  BP: (141-175)/(79-94) 164/82     Weight: 63.5 kg (140 lb)  Body mass index is 22.6 kg/m².    Physical Exam  Vitals and nursing note reviewed.   Constitutional:       General: He is not in acute distress.     Appearance: He is well-developed. He is not ill-appearing.   HENT:      Head: Normocephalic and atraumatic.      Mouth/Throat:      Mouth: Mucous membranes are moist.   Eyes:      Extraocular Movements: Extraocular movements intact.      Conjunctiva/sclera: Conjunctivae normal.      Pupils: Pupils are equal, round, and reactive to light.   Cardiovascular:      Rate and Rhythm: Normal rate and regular rhythm.      Heart sounds: Normal heart sounds.      Comments: No JVD  Pulmonary:      Effort: Pulmonary effort is  normal. No respiratory distress.      Breath sounds: Rales present. No wheezing.      Comments: Sating 88% on 2L, increased to 92% on 3L  Abdominal:      General: Bowel sounds are normal. There is no distension.      Palpations: Abdomen is soft.      Tenderness: There is no abdominal tenderness.   Musculoskeletal:         General: No tenderness. Normal range of motion.      Cervical back: Normal range of motion and neck supple.      Right lower leg: No edema.      Left lower leg: No edema.   Skin:     General: Skin is warm and dry.      Capillary Refill: Capillary refill takes less than 2 seconds.      Findings: No rash.   Neurological:      Mental Status: He is alert and oriented to person, place, and time.      Cranial Nerves: No cranial nerve deficit.      Sensory: No sensory deficit.      Coordination: Coordination normal.   Psychiatric:         Behavior: Behavior normal.         Thought Content: Thought content normal.         Judgment: Judgment normal.         CRANIAL NERVES     CN III, IV, VI   Pupils are equal, round, and reactive to light.     Significant Labs: All pertinent labs within the past 24 hours have been reviewed.  CBC:   Recent Labs   Lab 01/28/23 0427   WBC 7.44   HGB 11.2*   HCT 39.2*        CMP:   Recent Labs   Lab 01/28/23 0427      K 3.8   CL 98   CO2 31*      BUN 8   CREATININE 0.8   CALCIUM 9.2   PROT 7.4   ALBUMIN 3.2*   BILITOT 0.3   ALKPHOS 77   AST 15   ALT 9*   ANIONGAP 10     BNP  Recent Labs   Lab 01/28/23 0427   BNP 22        Significant Imaging: I have reviewed all pertinent imaging results/findings within the past 24 hours.    X-Ray Chest AP Portable  Narrative: EXAMINATION:  XR CHEST AP PORTABLE    CLINICAL HISTORY:  Shortness of breath    TECHNIQUE:  Single frontal view of the chest was performed.    COMPARISON:  Prior examinations, most recent of which is January 8, 2023    FINDINGS:  Single portable chest view is submitted.  The cardiomediastinal  silhouette appears stable.  Aortic atherosclerotic change noted.  There is prominence of the pulmonary vascular however this appears similar to prior examinations.  Linear density at the mid right hemithorax may relate to an area of scarring possible mild atelectatic change.  Overall appearance of the pulmonary bronchovascular markings is stable when compared to prior examinations, there is no evidence for superimposed confluent infiltrate or consolidation, significant pleural effusion or pneumothorax.  The osseous structures demonstrate chronic change.  Impression: Radiographic findings as above.    Electronically signed by: Haja He  Date:    01/28/2023  Time:    04:30       Assessment/Plan:     * Acute on chronic respiratory failure with hypoxia and hypercapnia  Patient with Hypoxic Respiratory failure which is Acute on chronic.  he is on home oxygen at 2 LPM. Supplemental oxygen was provided and noted-  .   Signs/symptoms of respiratory failure include- dyspnea. Contributing diagnoses includes - CHF Labs and images were reviewed. Patient Has not had a recent ABG. Will treat underlying causes and adjust management of respiratory failure    Coronary artery disease of native artery of native heart with stable angina pectoris  - medically managed with lasix, statin, ARB, BP control  - no chest pain, troponin negative, ECG without acute ischemia    BiPAP (biphasic positive airway pressure) dependence  Obstructive Sleep Apnea  - Pt with issues obtaining necessary supplies for home bipap over the past 2 weeks  - RT and CM assisting with outpatient medical supplier    Atrial fibrillation  Patient with Long standing persistent (>12 months) atrial fibrillation which is controlled currently with Calcium Channel Blocker. Patient is currently in sinus rhythm.[id number] Score: 1.  Anticoagulation indicated. Anticoagulation done with eliquis.  - continue home diltiazem 180 mg daily    Chronic diastolic heart  failure  Patient presents with SOB, desatted to 85% on home 2L. Reports he has not been able to get a CPAP mask for the past two weeks.   - currently 88% on 2L  - BNP WNL, CXR consistent with previous  - S/p 40 mg IV lasix in the ED     Results for orders placed during the hospital encounter of 11/18/22  Echo  Interpretation Summary  · The left ventricle is normal in size with normal systolic function.  · The estimated ejection fraction is 53%.  · There are segmental left ventricular wall motion abnormalities.  · Normal left ventricular diastolic function.  · There is abnormal septal wall motion.  · Normal right ventricular size with normal right ventricular systolic function.  · Normal central venous pressure (3 mmHg).  · There is a small right pleural effusion.    Chronic obstructive pulmonary disease  - no evidence of exacerbation  - continue home inhalers  - duonebs PRN    Dependence on nicotine from cigarettes  - Pt reports intermittent use of cigarettes   - Dangers of cigarette smoking (especially given concurrent oxygen use) were reviewed in detail for ~12 minutes and pt was encouraged to quit.  - Nicotine replacement options discussed and ordered  - Plan to provide nicotine replacement at discharge     Essential hypertension  - elevated on admit-  - continue home diltiazem 180 mg daily, increase losartan from 25 mg to 50 mg daily  - cardiac diet      VTE Risk Mitigation (From admission, onward)         Ordered     apixaban tablet 5 mg  2 times daily         01/28/23 0941     IP VTE HIGH RISK PATIENT  Once         01/28/23 0941     Place sequential compression device  Until discontinued         01/28/23 0941                   Viktoriya Waldron PA-C  Department of Hospital Medicine   [facility] - Emergency Dept

## 2023-01-28 NOTE — ASSESSMENT & PLAN NOTE
- medically managed with lasix, statin, ARB, BP control  - no chest pain, troponin negative, ECG without acute ischemia

## 2023-01-28 NOTE — ASSESSMENT & PLAN NOTE
Patient presents with SOB, desatted to 85% on home 2L. Reports he has not been able to get a CPAP mask for the past two weeks.   - currently 88% on 2L  - BNP WNL, CXR consistent with previous  - S/p 40 mg IV lasix in the ED     Results for orders placed during the hospital encounter of 11/18/22  Echo  Interpretation Summary  · The left ventricle is normal in size with normal systolic function.  · The estimated ejection fraction is 53%.  · There are segmental left ventricular wall motion abnormalities.  · Normal left ventricular diastolic function.  · There is abnormal septal wall motion.  · Normal right ventricular size with normal right ventricular systolic function.  · Normal central venous pressure (3 mmHg).  · There is a small right pleural effusion.

## 2023-01-28 NOTE — PHARMACY MED REC
"Admission Medication History     The home medication history was taken by Rj Gudino.    You may go to "Admission" then "Reconcile Home Medications" tabs to review and/or act upon these items.     The home medication list has been updated by the Pharmacy department.   Please read ALL comments highlighted in yellow.   Please address this information as you see fit.    Feel free to contact us if you have any questions or require assistance.      The medications listed below were removed from the home medication list. Please reorder if appropriate:  Patient reports no longer taking the following medication(s):  AFORMOTEROL 15 MCG/2ML  FUROSEMIDE 20 MG  NICOTINE 21 MG/24HR  TIOTROPIUM BROMIDE 2.5 MCG/ACULATION    Medications listed below were obtained from: Patient's pharmacy  Current Outpatient Medications on File Prior to Encounter   Medication Sig    acetaminophen (TYLENOL) 500 MG tablet   Take 1 tablet (500 mg total) by mouth every 6 (six) hours as needed for Pain.    albuterol (PROVENTIL/VENTOLIN HFA) 90 mcg/actuation inhaler Inhale 1-2 puffs into the lungs every 6 (six) hours as needed for Wheezing. Rescue      albuterol-ipratropium (DUO-NEB) 2.5 mg-0.5 mg/3 mL nebulizer solution Use 1 vial (3 mLs) by nebulization every 4 (four) hours as needed for Wheezing or Shortness of Breath. Rescue      apixaban (ELIQUIS) 5 mg Tab Take 1 tablet (5 mg total) by mouth 2 (two) times daily.      atorvastatin (LIPITOR) 40 MG tablet   Take 1 tablet (40 mg total) by mouth once daily.    bismuth subsalicylate (PEPTO BISMOL) 262 mg/15 mL suspension   Take 30 mLs by mouth daily as needed for Indigestion.    budesonide (PULMICORT) 0.5 mg/2 mL nebulizer solution Take 2 mLs (0.5 mg total) by nebulization 2 (two) times a day. Controller      calcium carbonate (TUMS ORAL)   Take 2 tablets by mouth daily as needed (Heartburn).    diltiaZEM (CARDIZEM) 90 MG tablet   Take 2 tablets (180 mg total) by mouth once daily.    ferrous sulfate " 325 (65 FE) MG EC tablet   Take 1 tablet (325 mg total) by mouth once daily.    folic acid (FOLVITE) 1 MG tablet   Take 1 tablet (1 mg total) by mouth once daily.    losartan (COZAAR) 25 MG tablet   Take 1 tablet (25 mg total) by mouth once daily.    multivit-min-FA-lycopen-lutein (CERTAVITE SENIOR) 0.4 mg-300 mcg- 250 mcg Tab   Take 1 tablet by mouth once daily.    pantoprazole (PROTONIX) 40 MG tablet   Take 1 tablet (40 mg total) by mouth once daily.    thiamine 100 MG tablet Take 1 tablet (100 mg total) by mouth once daily.     Rj Gudino  [id number]                 .

## 2023-01-28 NOTE — HPI
Baltazar Mccray is 62 y.o. M with A fib on Eliquis, HFpEF, CAD, HTN, HLD, GERD, COPD (on 2 L O2 at home), and seizures presenting to the ED for shortness of breath, which he reports started 2 weeks ago when he had issues with him home bipap. Pt reports he is on bipap at home but does not have a mask or filters. He has attempted to contact the supplies company without success. Since that time, he reports becoming more short of breath. He denies worsening cough or increased sputum production. He is complaint with home medications and home oxygen. He reports his oxygen levels at home have been >90% on 2L. He is able to use home O2 between 2-5L but has not increased his O2 for symptomatic support. Pt denies fever, chills, chest pain, palpitations, recent travel, leg pain or swelling, abdominal pain, N/V/D, dysuria, confusion, HA, syncope or recent sick contacts.        In the ED: Hypertensive to 175/94, sating 85% on home 2L O2, afebrile. CBC with stable anemia, CMP wnl. BNP 22. Troponin 0.019. EKG without acute changes. CXR without acute process. Patient given lasix 40 mg IV X 1 and duonebs and admitted to observation.

## 2023-01-28 NOTE — PLAN OF CARE
Management Plan  Date Initiated 1-28-23      Patient Name: Baltazar TREVINO SNP (SPECIAL NEEDS PLAN)          Delphine Orona MD           Patient Name: Baltazar Mccray                                                    See your PCP for follow up per discussion with your E-D provider   See your pulmonologist and cardiologist as discussed  Active discussions with your providers to increase your medical diagnosis needs and compliance  DME equipment to be monitored -repaired per protocol  Maintain stable and appropriate housing (homeless in the past)  Actively maintain the [facility] home NP program and keep all appt's scheduled w providers  U-turn/high utilizer team to assist as needed             If patient presents to the ED, the suggested plan of care:  Will re-evaluate per ED concerns and Mr Woodard concerns    If patient is admitted, the suggested plan of care:  N/A for todays note, await IB bed/floor assignments    Mental Health Diagnosis:   Social History     Substance and Sexual Activity   Alcohol Use Yes    Alcohol/week: 2.0 standard drinks    Types: 2 Cans of beer per week    Comment: every night     Social History     Substance and Sexual Activity   Drug Use No      Goals        Attend all appointments      Compliance with BiPAP machine and supplies and sleep study team.              No data recorded  No data recorded  No data recorded  Social Determinants of Health     Tobacco Use: High Risk    Smoking Tobacco Use: Some Days    Smokeless Tobacco Use: Never    Passive Exposure: Not on file   Alcohol Use: Not At Risk    Frequency of Alcohol Consumption: 2-4 times a month    Average Number of Drinks: Patient does not drink    Frequency of Binge Drinking: Never   Financial Resource Strain: Low Risk     Difficulty of Paying Living Expenses: Not hard at all   Food Insecurity: Unknown    Worried About Running Out of Food in the Last Year: Patient refused    Ran Out of Food in the Last Year: Patient  refused   Transportation Needs: No Transportation Needs    Lack of Transportation (Medical): No    Lack of Transportation (Non-Medical): No   Physical Activity: Unknown    Days of Exercise per Week: Patient refused    Minutes of Exercise per Session: Patient refused   Stress: Stress Concern Present    Feeling of Stress : Rather much   Social Connections: Socially Isolated    Frequency of Communication with Friends and Family: More than three times a week    Frequency of Social Gatherings with Friends and Family: Twice a week    Attends [employer] Services: Never    Active Member of Clubs or Organizations: No    Attends Club or Organization Meetings: Patient refused    Marital Status: Never    Housing Stability: Low Risk     Unable to Pay for Housing in the Last Year: No    Number of Places Lived in the Last Year: 2    Unstable Housing in the Last Year: No   Depression: Low Risk     Last PHQ Score: 0         Assessment:  Are you on dialysis?: No    Does the patient currently use HME?: Yes    Current Living Arrangements: home    How do you get to doctors appointments?: family or friend will provide; health plan transportation; public transportation       Recommendations:   See PCP in 1 week  Resolve your DME supplies with your sleep doctor and O-DME  See your specialty clinics as per analy mcfarlane your medical team

## 2023-01-28 NOTE — ASSESSMENT & PLAN NOTE
Obstructive Sleep Apnea  - Pt with issues obtaining necessary supplies for home bipap over the past 2 weeks  - RT and CM assisting with outpatient medical supplier

## 2023-01-28 NOTE — ASSESSMENT & PLAN NOTE
Patient with Hypoxic Respiratory failure which is Acute on chronic.  he is on home oxygen at 2 LPM. Supplemental oxygen was provided and noted-  .   Signs/symptoms of respiratory failure include- dyspnea. Contributing diagnoses includes - CHF Labs and images were reviewed. Patient Has not had a recent ABG. Will treat underlying causes and adjust management of respiratory failure

## 2023-01-28 NOTE — SUBJECTIVE & OBJECTIVE
Past Medical History:   Diagnosis Date    Aspiration pneumonia 5/30/2021    Asthma     Atrial fibrillation with rapid ventricular response     CHF (congestive heart failure)     COPD (chronic obstructive pulmonary disease)     home O2 at night only    Coronary artery disease     Hypertension     Lung nodule     Pneumonia     Seizures        Past Surgical History:   Procedure Laterality Date    ESOPHAGOGASTRODUODENOSCOPY N/A 2/11/2022    Procedure: EGD (ESOPHAGOGASTRODUODENOSCOPY);  Surgeon: Cain Ortega MD;  Location: [facility] ENDO (2ND FLR);  Service: Endoscopy;  Laterality: N/A;    ESOPHAGOGASTRODUODENOSCOPY N/A 9/15/2022    Procedure: EGD (ESOPHAGOGASTRODUODENOSCOPY);  Surgeon: Leonid Chavez MD;  Location: [facility] ENDO (2ND FLR);  Service: Endoscopy;  Laterality: N/A;  PA-50 - 2nd floor  vacc-wears 2L o2-inst mail and verbal-clears 4 hrs prior-tb  8/30 pt rescheduled; updated instructions mailed-st    LEFT HEART CATHETERIZATION  4/23/2020    Procedure: Left heart cath;  Surgeon: Nic Pollack MD;  Location: [facility] CATH LAB;  Service: Cardiology;;       Review of patient's allergies indicates:   Allergen Reactions    Benazepril Swelling       No current facility-administered medications on file prior to encounter.     Current Outpatient Medications on File Prior to Encounter   Medication Sig    acetaminophen (TYLENOL) 500 MG tablet Take 1 tablet (500 mg total) by mouth every 6 (six) hours as needed for Pain.    albuterol (PROVENTIL/VENTOLIN HFA) 90 mcg/actuation inhaler Inhale 1-2 puffs into the lungs every 6 (six) hours as needed for Wheezing. Rescue    albuterol-ipratropium (DUO-NEB) 2.5 mg-0.5 mg/3 mL nebulizer solution Use 1 vial (3 mLs) by nebulization every 4 (four) hours as needed for Wheezing or Shortness of Breath. Rescue    apixaban (ELIQUIS) 5 mg Tab Take 1 tablet (5 mg total) by mouth 2 (two) times daily.    atorvastatin (LIPITOR) 40 MG tablet Take 1 tablet (40 mg total) by mouth once daily.     bismuth subsalicylate (PEPTO BISMOL) 262 mg/15 mL suspension Take 30 mLs by mouth daily as needed for Indigestion.    budesonide (PULMICORT) 0.5 mg/2 mL nebulizer solution Take 2 mLs (0.5 mg total) by nebulization 2 (two) times a day. Controller    calcium carbonate (TUMS ORAL) Take 2 tablets by mouth daily as needed (Heartburn).    diltiaZEM (CARDIZEM) 90 MG tablet Take 2 tablets (180 mg total) by mouth once daily.    ferrous sulfate 325 (65 FE) MG EC tablet Take 1 tablet (325 mg total) by mouth once daily.    folic acid (FOLVITE) 1 MG tablet Take 1 tablet (1 mg total) by mouth once daily.    losartan (COZAAR) 25 MG tablet Take 1 tablet (25 mg total) by mouth once daily.    multivit-min-FA-lycopen-lutein (CERTAVITE SENIOR) 0.4 mg-300 mcg- 250 mcg Tab Take 1 tablet by mouth once daily.    pantoprazole (PROTONIX) 40 MG tablet Take 1 tablet (40 mg total) by mouth once daily.    thiamine 100 MG tablet Take 1 tablet (100 mg total) by mouth once daily.    inhalat.spacing dev,large mask (COMPACT SPACE CHAMBER-LRG MASK) Spcr Use as directed    [DISCONTINUED] arformoteroL (BROVANA) 15 mcg/2 mL Nebu Take 2 mLs (15 mcg total) by nebulization 2 (two) times daily. Controller    [DISCONTINUED] furosemide (LASIX) 20 MG tablet Take 1 tablet (20 mg total) by mouth once daily.    [DISCONTINUED] nicotine (NICODERM CQ) 21 mg/24 hr Place 1 patch onto the skin once daily.    [DISCONTINUED] tiotropium bromide (SPIRIVA RESPIMAT) 2.5 mcg/actuation inhaler Inhale 2 puffs into the lungs Daily. Controller     Family History       Problem Relation (Age of Onset)    Cancer Father    Hypertension Sister    Stroke Sister, Brother          Tobacco Use    Smoking status: Some Days     Packs/day: 0.25     Types: Cigarettes    Smokeless tobacco: Never    Tobacco comments:     1-2 cigarettes per day   Substance and Sexual Activity    Alcohol use: Yes     Alcohol/week: 2.0 standard drinks     Types: 2 Cans of beer per week     Comment: every night     Drug use: No    Sexual activity: Not Currently     Review of Systems   Constitutional:  Positive for appetite change. Negative for activity change, chills, diaphoresis and fever.   HENT:  Negative for trouble swallowing.    Eyes:  Negative for photophobia and visual disturbance.   Respiratory:  Positive for shortness of breath. Negative for chest tightness and wheezing.    Cardiovascular:  Negative for chest pain, palpitations and leg swelling.   Gastrointestinal:  Negative for abdominal pain, constipation, diarrhea, nausea and vomiting.   Genitourinary:  Negative for dysuria, frequency, hematuria and urgency.   Musculoskeletal:  Negative for arthralgias and gait problem.   Skin:  Negative for color change and rash.   Neurological:  Negative for dizziness, syncope, weakness, light-headedness, numbness and headaches.   Hematological:  Bruises/bleeds easily.   Psychiatric/Behavioral:  Negative for agitation and confusion. The patient is not nervous/anxious.    Objective:     Vital Signs (Most Recent):  Temp: 98.3 °F (36.8 °C) (01/28/23 0734)  Pulse: 88 (01/28/23 1016)  Resp: 16 (01/28/23 1016)  BP: (!) 164/82 (01/28/23 1016)  SpO2: 99 % (01/28/23 1016)   Vital Signs (24h Range):  Temp:  [98.3 °F (36.8 °C)-98.8 °F (37.1 °C)] 98.3 °F (36.8 °C)  Pulse:  [] 88  Resp:  [16-20] 16  SpO2:  [85 %-99 %] 99 %  BP: (141-175)/(79-94) 164/82     Weight: 63.5 kg (140 lb)  Body mass index is 22.6 kg/m².    Physical Exam  Vitals and nursing note reviewed.   Constitutional:       General: He is not in acute distress.     Appearance: He is well-developed. He is not ill-appearing.   HENT:      Head: Normocephalic and atraumatic.      Mouth/Throat:      Mouth: Mucous membranes are moist.   Eyes:      Extraocular Movements: Extraocular movements intact.      Conjunctiva/sclera: Conjunctivae normal.      Pupils: Pupils are equal, round, and reactive to light.   Cardiovascular:      Rate and Rhythm: Normal rate and regular rhythm.       Heart sounds: Normal heart sounds.      Comments: No JVD  Pulmonary:      Effort: Pulmonary effort is normal. No respiratory distress.      Breath sounds: Rales present. No wheezing.      Comments: Sating 88% on 2L, increased to 92% on 3L  Abdominal:      General: Bowel sounds are normal. There is no distension.      Palpations: Abdomen is soft.      Tenderness: There is no abdominal tenderness.   Musculoskeletal:         General: No tenderness. Normal range of motion.      Cervical back: Normal range of motion and neck supple.      Right lower leg: No edema.      Left lower leg: No edema.   Skin:     General: Skin is warm and dry.      Capillary Refill: Capillary refill takes less than 2 seconds.      Findings: No rash.   Neurological:      Mental Status: He is alert and oriented to person, place, and time.      Cranial Nerves: No cranial nerve deficit.      Sensory: No sensory deficit.      Coordination: Coordination normal.   Psychiatric:         Behavior: Behavior normal.         Thought Content: Thought content normal.         Judgment: Judgment normal.         CRANIAL NERVES     CN III, IV, VI   Pupils are equal, round, and reactive to light.     Significant Labs: All pertinent labs within the past 24 hours have been reviewed.  CBC:   Recent Labs   Lab 01/28/23 0427   WBC 7.44   HGB 11.2*   HCT 39.2*        CMP:   Recent Labs   Lab 01/28/23 0427      K 3.8   CL 98   CO2 31*      BUN 8   CREATININE 0.8   CALCIUM 9.2   PROT 7.4   ALBUMIN 3.2*   BILITOT 0.3   ALKPHOS 77   AST 15   ALT 9*   ANIONGAP 10     BNP  Recent Labs   Lab 01/28/23 0427   BNP 22        Significant Imaging: I have reviewed all pertinent imaging results/findings within the past 24 hours.    X-Ray Chest AP Portable  Narrative: EXAMINATION:  XR CHEST AP PORTABLE    CLINICAL HISTORY:  Shortness of breath    TECHNIQUE:  Single frontal view of the chest was performed.    COMPARISON:  Prior examinations, most recent of which  is January 8, 2023    FINDINGS:  Single portable chest view is submitted.  The cardiomediastinal silhouette appears stable.  Aortic atherosclerotic change noted.  There is prominence of the pulmonary vascular however this appears similar to prior examinations.  Linear density at the mid right hemithorax may relate to an area of scarring possible mild atelectatic change.  Overall appearance of the pulmonary bronchovascular markings is stable when compared to prior examinations, there is no evidence for superimposed confluent infiltrate or consolidation, significant pleural effusion or pneumothorax.  The osseous structures demonstrate chronic change.  Impression: Radiographic findings as above.    Electronically signed by: Haja He  Date:    01/28/2023  Time:    04:30

## 2023-01-29 LAB
ANION GAP SERPL CALC-SCNC: 12 MMOL/L (ref 8–16)
BASOPHILS # BLD AUTO: 0.07 K/UL (ref 0–0.2)
BASOPHILS NFR BLD: 0.9 % (ref 0–1.9)
BUN SERPL-MCNC: 13 MG/DL (ref 6–20)
CALCIUM SERPL-MCNC: 9.4 MG/DL (ref 8.7–10.5)
CHLORIDE SERPL-SCNC: 97 MMOL/L (ref 95–110)
CO2 SERPL-SCNC: 31 MMOL/L (ref 23–29)
CREAT SERPL-MCNC: 0.8 MG/DL (ref 0.5–1.4)
DIFFERENTIAL METHOD: ABNORMAL
EOSINOPHIL # BLD AUTO: 0.4 K/UL (ref 0–0.5)
EOSINOPHIL NFR BLD: 5.4 % (ref 0–8)
ERYTHROCYTE [DISTWIDTH] IN BLOOD BY AUTOMATED COUNT: 17.3 % (ref 11.5–14.5)
EST. GFR  (NO RACE VARIABLE): >60 ML/MIN/1.73 M^2
GLUCOSE SERPL-MCNC: 79 MG/DL (ref 70–110)
HCT VFR BLD AUTO: 39.6 % (ref 40–54)
HGB BLD-MCNC: 11.3 G/DL (ref 14–18)
IMM GRANULOCYTES # BLD AUTO: 0.01 K/UL (ref 0–0.04)
IMM GRANULOCYTES NFR BLD AUTO: 0.1 % (ref 0–0.5)
LYMPHOCYTES # BLD AUTO: 2.4 K/UL (ref 1–4.8)
LYMPHOCYTES NFR BLD: 32.6 % (ref 18–48)
MAGNESIUM SERPL-MCNC: 1.9 MG/DL (ref 1.6–2.6)
MCH RBC QN AUTO: 24.7 PG (ref 27–31)
MCHC RBC AUTO-ENTMCNC: 28.5 G/DL (ref 32–36)
MCV RBC AUTO: 87 FL (ref 82–98)
MONOCYTES # BLD AUTO: 0.8 K/UL (ref 0.3–1)
MONOCYTES NFR BLD: 10.2 % (ref 4–15)
NEUTROPHILS # BLD AUTO: 3.8 K/UL (ref 1.8–7.7)
NEUTROPHILS NFR BLD: 50.8 % (ref 38–73)
NRBC BLD-RTO: 0 /100 WBC
PHOSPHATE SERPL-MCNC: 3.5 MG/DL (ref 2.7–4.5)
PLATELET # BLD AUTO: 335 K/UL (ref 150–450)
PMV BLD AUTO: 11.2 FL (ref 9.2–12.9)
POTASSIUM SERPL-SCNC: 4.1 MMOL/L (ref 3.5–5.1)
RBC # BLD AUTO: 4.58 M/UL (ref 4.6–6.2)
SODIUM SERPL-SCNC: 140 MMOL/L (ref 136–145)
WBC # BLD AUTO: 7.42 K/UL (ref 3.9–12.7)

## 2023-01-29 PROCEDURE — G0378 HOSPITAL OBSERVATION PER HR: HCPCS

## 2023-01-29 PROCEDURE — 99233 PR SUBSEQUENT HOSPITAL CARE,LEVL III: ICD-10-PCS | Mod: GC,,,

## 2023-01-29 PROCEDURE — 25000242 PHARM REV CODE 250 ALT 637 W/ HCPCS

## 2023-01-29 PROCEDURE — 94640 AIRWAY INHALATION TREATMENT: CPT | Mod: XB

## 2023-01-29 PROCEDURE — 94660 CPAP INITIATION&MGMT: CPT

## 2023-01-29 PROCEDURE — 84100 ASSAY OF PHOSPHORUS: CPT

## 2023-01-29 PROCEDURE — 80048 BASIC METABOLIC PNL TOTAL CA: CPT

## 2023-01-29 PROCEDURE — 25000003 PHARM REV CODE 250

## 2023-01-29 PROCEDURE — 94761 N-INVAS EAR/PLS OXIMETRY MLT: CPT

## 2023-01-29 PROCEDURE — 85025 COMPLETE CBC W/AUTO DIFF WBC: CPT

## 2023-01-29 PROCEDURE — 27000221 HC OXYGEN, UP TO 24 HOURS

## 2023-01-29 PROCEDURE — 25500020 PHARM REV CODE 255: Performed by: STUDENT IN AN ORGANIZED HEALTH CARE EDUCATION/TRAINING PROGRAM

## 2023-01-29 PROCEDURE — 99900035 HC TECH TIME PER 15 MIN (STAT)

## 2023-01-29 PROCEDURE — S4991 NICOTINE PATCH NONLEGEND: HCPCS

## 2023-01-29 PROCEDURE — 36415 COLL VENOUS BLD VENIPUNCTURE: CPT

## 2023-01-29 PROCEDURE — 83735 ASSAY OF MAGNESIUM: CPT

## 2023-01-29 PROCEDURE — 99233 SBSQ HOSP IP/OBS HIGH 50: CPT | Mod: GC,,,

## 2023-01-29 RX ORDER — IPRATROPIUM BROMIDE AND ALBUTEROL SULFATE 2.5; .5 MG/3ML; MG/3ML
3 SOLUTION RESPIRATORY (INHALATION)
Status: DISCONTINUED | OUTPATIENT
Start: 2023-01-29 | End: 2023-01-30 | Stop reason: HOSPADM

## 2023-01-29 RX ADMIN — PANTOPRAZOLE SODIUM 40 MG: 40 TABLET, DELAYED RELEASE ORAL at 09:01

## 2023-01-29 RX ADMIN — SALMETEROL XINAFOATE 1 PUFF: 50 POWDER, METERED ORAL; RESPIRATORY (INHALATION) at 09:01

## 2023-01-29 RX ADMIN — IPRATROPIUM BROMIDE AND ALBUTEROL SULFATE 3 ML: .5; 3 SOLUTION RESPIRATORY (INHALATION) at 05:01

## 2023-01-29 RX ADMIN — THIAMINE HCL TAB 100 MG 100 MG: 100 TAB at 09:01

## 2023-01-29 RX ADMIN — FERROUS SULFATE TAB 325 MG (65 MG ELEMENTAL FE) 1 EACH: 325 (65 FE) TAB at 09:01

## 2023-01-29 RX ADMIN — APIXABAN 5 MG: 5 TABLET, FILM COATED ORAL at 09:01

## 2023-01-29 RX ADMIN — FUROSEMIDE 20 MG: 20 TABLET ORAL at 09:01

## 2023-01-29 RX ADMIN — BUDESONIDE 0.5 MG: 0.5 INHALANT ORAL at 09:01

## 2023-01-29 RX ADMIN — ACETAMINOPHEN 650 MG: 325 TABLET ORAL at 11:01

## 2023-01-29 RX ADMIN — BUDESONIDE 0.5 MG: 0.5 INHALANT ORAL at 08:01

## 2023-01-29 RX ADMIN — IPRATROPIUM BROMIDE AND ALBUTEROL SULFATE 3 ML: .5; 3 SOLUTION RESPIRATORY (INHALATION) at 09:01

## 2023-01-29 RX ADMIN — FOLIC ACID 1 MG: 1 TABLET ORAL at 09:01

## 2023-01-29 RX ADMIN — IOHEXOL 100 ML: 350 INJECTION, SOLUTION INTRAVENOUS at 08:01

## 2023-01-29 RX ADMIN — ACETAMINOPHEN 650 MG: 325 TABLET ORAL at 09:01

## 2023-01-29 RX ADMIN — ATORVASTATIN CALCIUM 40 MG: 40 TABLET, FILM COATED ORAL at 09:01

## 2023-01-29 RX ADMIN — ALUMINUM HYDROXIDE, MAGNESIUM HYDROXIDE, AND SIMETHICONE 30 ML: 200; 200; 20 SUSPENSION ORAL at 04:01

## 2023-01-29 RX ADMIN — DILTIAZEM HYDROCHLORIDE 180 MG: 60 TABLET, FILM COATED ORAL at 09:01

## 2023-01-29 RX ADMIN — IPRATROPIUM BROMIDE AND ALBUTEROL SULFATE 3 ML: .5; 3 SOLUTION RESPIRATORY (INHALATION) at 08:01

## 2023-01-29 RX ADMIN — SALMETEROL XINAFOATE 1 PUFF: 50 POWDER, METERED ORAL; RESPIRATORY (INHALATION) at 08:01

## 2023-01-29 RX ADMIN — LOSARTAN POTASSIUM 50 MG: 50 TABLET, FILM COATED ORAL at 09:01

## 2023-01-29 RX ADMIN — Medication 1 PATCH: at 09:01

## 2023-01-29 NOTE — PLAN OF CARE
"[facility]      HOME HEALTH ORDERS  FACE TO FACE ENCOUNTER    Patient Name: Baltazar Mccray  YOB: 1960    PCP: Delphine Orona MD   PCP [facility] [address] [facility] [address]  PCP Phone Number: [phone]  PCP Fax: [phone]    Encounter Date: 1/28/23    Admit to Home Health    Diagnoses:  Active Hospital Problems    Diagnosis  POA    *Acute on chronic respiratory failure with hypoxia and hypercapnia [J96.21, J96.22]  Yes     Priority: 1 - High    LIBRA (obstructive sleep apnea) [G47.33]  Yes     Requires CPAP mask & supplies. Mr Mccray and I called the [facility] during his appointment and they stated that he requires a specialist appointment in order for him to get his mask supplies refilled.      Coronary artery disease of native artery of native heart with stable angina pectoris [I25.118]  Yes     Underwent LHC in 2020 via R Radial artery without complications. Intermittently elevated Troponin and EKG changes likely 2/2 to demand-ischemia in setting of acute on chronic respiratory failure with hypoxia. Seen on chest CT 2018: "There are scattered coronary artery calcifications." Medically managed with lasix, statin, ARB, BP control      BiPAP (biphasic positive airway pressure) dependence [Z99.89]  Not Applicable    Atrial fibrillation [I48.91]  Yes    Chronic diastolic heart failure [I50.32]  Yes    Chronic obstructive pulmonary disease [J44.9]  Yes     Chronic    Dependence on nicotine from cigarettes [F17.210]  Yes    Essential hypertension [I10]  Yes     Chronic      Resolved Hospital Problems   No resolved problems to display.       Follow Up Appointments:  Future Appointments   Date Time Provider Department Center   1/31/2023  8:30 AM PULMONARY FUNCTION [facility] PULMLAB [facility]   1/31/2023  9:00 AM NURSE, [facility] MEDADVANTAGE CLINIC [facility] MED CLN [facility]   1/31/2023  9:30 AM Carlo Pascal MD [facility] PULMSVC [facility]   1/31/2023 11:45 AM PULMONARY " FUNCTION [facility] PULMLAB [address]   1/31/2023  2:00 PM PULMONARY FUNCTION [facility] PULMLAB [address]   2/1/2023 11:30 AM Delphine Orona MD [facility] MED CLN [address] [facility]   2/7/2023  8:00 AM Susan Rae, NP [facility] C3H [place]   2/9/2023  9:30 AM NURSE, [facility] MEDADVANTAGE CLINIC [facility] MED CLN [address] [facility]   2/20/2023 10:00 AM Delphine Orona MD [facility] MED CLN [address] [facility]   4/24/2023  1:30 PM Hung Malave MD [facility] SLEEP [facility]       Allergies:  Review of patient's allergies indicates:   Allergen Reactions    Benazepril Swelling       Medications: Review discharge medications with patient and family and provide education.    Current Facility-Administered Medications   Medication Dose Route Frequency Provider Last Rate Last Admin    acetaminophen tablet 650 mg  650 mg Oral Q4H PRN Viktoriya Waldron PA-C   650 mg at 01/29/23 1103    albuterol-ipratropium 2.5 mg-0.5 mg/3 mL nebulizer solution 3 mL  3 mL Nebulization Q4H PRN Viktoriya Waldron PA-C        albuterol-ipratropium 2.5 mg-0.5 mg/3 mL nebulizer solution 3 mL  3 mL Nebulization Q4H WAKE Viktoriya Waldron PA-C        aluminum-magnesium hydroxide-simethicone 200-200-20 mg/5 mL suspension 30 mL  30 mL Oral QID PRN Viktoriya Waldron PA-C        apixaban tablet 5 mg  5 mg Oral BID Viktoriya Waldron PA-C   5 mg at 01/29/23 0906    atorvastatin tablet 40 mg  40 mg Oral Daily Viktoriya Waldron PA-C   40 mg at 01/29/23 0905    bisacodyL suppository 10 mg  10 mg Rectal Daily PRN Viktoriya Waldron PA-C        budesonide nebulizer solution 0.5 mg  0.5 mg Nebulization BID Viktoriya Waldron PA-C   0.5 mg at 01/29/23 0841    dextrose 10% bolus 125 mL 125 mL  12.5 g Intravenous PRN Marilee Jackson MD        dextrose 10% bolus 250 mL 250 mL  25 g Intravenous PRN Marilee Jackson MD        diltiaZEM tablet 180 mg  180 mg Oral Daily Viktoriya Waldron PA-C   180 mg at 01/29/23 0905    ferrous sulfate tablet 1 each  1 tablet Oral Daily Viktoriya Waldron PA-C   1 each at 01/29/23 0906    folic  acid tablet 1 mg  1 mg Oral Daily Viktoriya Waldron PA-C   1 mg at 01/29/23 0905    furosemide tablet 20 mg  20 mg Oral Daily Viktoriya Waldron PA-C   20 mg at 01/29/23 0906    glucagon (human recombinant) injection 1 mg  1 mg Intramuscular PRN Viktoriya Waldron PA-C        glucose chewable tablet 16 g  16 g Oral PRN Viktoriya Waldron PA-C        glucose chewable tablet 24 g  24 g Oral PRN Viktoriya Waldron PA-C        losartan tablet 50 mg  50 mg Oral Daily Viktoriya Waldron PA-C   50 mg at 01/29/23 0905    melatonin tablet 6 mg  6 mg Oral Nightly PRN Viktoriya Waldron PA-C        naloxone 0.4 mg/mL injection 0.02 mg  0.02 mg Intravenous PRN Viktoriya Waldron PA-C        nicotine 21 mg/24 hr 1 patch  1 patch Transdermal Daily Viktoriya Waldron PA-C   1 patch at 01/29/23 0906    ondansetron disintegrating tablet 8 mg  8 mg Oral Q8H PRN Viktoriya Waldron PA-C        pantoprazole EC tablet 40 mg  40 mg Oral Daily Viktoriya Waldron PA-C   40 mg at 01/29/23 0905    polyethylene glycol packet 17 g  17 g Oral BID PRN Viktoriya Waldron PA-C        prochlorperazine injection Soln 5 mg  5 mg Intravenous Q6H PRN Viktoriya Waldron PA-C        salmeteroL diskus inhaler 1 puff  1 puff Inhalation BID Viktoriya Waldron PA-C   1 puff at 01/29/23 0841    simethicone chewable tablet 80 mg  1 tablet Oral QID PRN Viktoriya Waldron PA-C        sodium chloride 0.9% flush 10 mL  10 mL Intravenous PRN Jerry Mcintyre MD        sodium chloride 0.9% flush 10 mL  10 mL Intravenous Q12H PRN Viktoriya Waldron PA-C        thiamine tablet 100 mg  100 mg Oral Daily Viktoriya Waldron PA-C   100 mg at 01/29/23 0905     Current Discharge Medication List        CONTINUE these medications which have NOT CHANGED    Details   acetaminophen (TYLENOL) 500 MG tablet Take 1 tablet (500 mg total) by mouth every 6 (six) hours as needed for Pain.  Qty: 90 tablet, Refills: 0    Associated Diagnoses: Epigastric pain      albuterol (PROVENTIL/VENTOLIN HFA) 90 mcg/actuation inhaler Inhale 1-2  puffs into the lungs every 6 (six) hours as needed for Wheezing. Rescue  Qty: 8.5 g, Refills: 2    Associated Diagnoses: Chronic obstructive pulmonary disease, unspecified COPD type      albuterol-ipratropium (DUO-NEB) 2.5 mg-0.5 mg/3 mL nebulizer solution Use 1 vial (3 mLs) by nebulization every 4 (four) hours as needed for Wheezing or Shortness of Breath. Rescue  Qty: 180 mL, Refills: 2    Associated Diagnoses: Chronic obstructive pulmonary disease, unspecified COPD type      apixaban (ELIQUIS) 5 mg Tab Take 1 tablet (5 mg total) by mouth 2 (two) times daily.  Qty: 180 tablet, Refills: 3    Associated Diagnoses: Paroxysmal atrial fibrillation      atorvastatin (LIPITOR) 40 MG tablet Take 1 tablet (40 mg total) by mouth once daily.  Qty: 90 tablet, Refills: 3    Associated Diagnoses: Hyperlipidemia, unspecified hyperlipidemia type      bismuth subsalicylate (PEPTO BISMOL) 262 mg/15 mL suspension Take 30 mLs by mouth daily as needed for Indigestion.      budesonide (PULMICORT) 0.5 mg/2 mL nebulizer solution Take 2 mLs (0.5 mg total) by nebulization 2 (two) times a day. Controller  Qty: 1440 mL, Refills: 3    Associated Diagnoses: Pulmonary cachexia due to chronic obstructive pulmonary disease; Chronic bronchitis, unspecified chronic bronchitis type      calcium carbonate (TUMS ORAL) Take 2 tablets by mouth daily as needed (Heartburn).      diltiaZEM (CARDIZEM) 90 MG tablet Take 2 tablets (180 mg total) by mouth once daily.  Qty: 180 tablet, Refills: 3    Associated Diagnoses: Chronic diastolic congestive heart failure      ferrous sulfate 325 (65 FE) MG EC tablet Take 1 tablet (325 mg total) by mouth once daily.  Qty: 30 tablet, Refills: 1    Associated Diagnoses: Chronic bronchitis, unspecified chronic bronchitis type      folic acid (FOLVITE) 1 MG tablet Take 1 tablet (1 mg total) by mouth once daily.  Qty: 30 tablet, Refills: 2    Associated Diagnoses: Chronic bronchitis, unspecified chronic bronchitis type       losartan (COZAAR) 25 MG tablet Take 1 tablet (25 mg total) by mouth once daily.  Qty: 60 tablet, Refills: 0    Comments: .      multivit-min-FA-lycopen-lutein (CERTAVITE SENIOR) 0.4 mg-300 mcg- 250 mcg Tab Take 1 tablet by mouth once daily.  Qty: 30 tablet, Refills: 2    Associated Diagnoses: Chronic bronchitis, unspecified chronic bronchitis type      pantoprazole (PROTONIX) 40 MG tablet Take 1 tablet (40 mg total) by mouth once daily.  Qty: 30 tablet, Refills: 11    Associated Diagnoses: Gastric ulcer, unspecified chronicity, unspecified whether gastric ulcer hemorrhage or perforation present      thiamine 100 MG tablet Take 1 tablet (100 mg total) by mouth once daily.  Qty: 30 tablet, Refills: 2    Associated Diagnoses: Chronic bronchitis, unspecified chronic bronchitis type      inhalat.spacing dev,large mask (COMPACT SPACE CHAMBER-LRG MASK) Spcr Use as directed  Qty: 1 each, Refills: 0           STOP taking these medications       arformoteroL (BROVANA) 15 mcg/2 mL Nebu Comments:   Reason for Stopping:         furosemide (LASIX) 20 MG tablet Comments:   Reason for Stopping:         nicotine (NICODERM CQ) 21 mg/24 hr Comments:   Reason for Stopping:                 I have seen and examined this patient within the last 30 days. My clinical findings that support the need for the home health skilled services and home bound status are the following:no   Weakness/numbness causing balance and gait disturbance due to Weakness/Debility making it taxing to leave home.     Diet:   cardiac diet    Labs:  None    Referrals/ Consults  Physical Therapy to evaluate and treat. Evaluate for home safety and equipment needs; Establish/upgrade home exercise program. Perform / instruct on therapeutic exercises, gait training, transfer training, and Range of Motion.  Occupational Therapy to evaluate and treat. Evaluate home environment for safety and equipment needs. Perform/Instruct on transfers, ADL training, ROM, and therapeutic  exercises.   to evaluate for community resources/long-range planning.  Aide to provide assistance with personal care, ADLs, and vital signs.    Activities:   activity as tolerated    Nursing:   Agency to admit patient within 24 hours of hospital discharge unless specified on physician order or at patient request    SN to complete comprehensive assessment including routine vital signs. Instruct on disease process and s/s of complications to report to MD. Review/verify medication list sent home with the patient at time of discharge  and instruct patient/caregiver as needed. Frequency may be adjusted depending on start of care date.     Skilled nurse to perform up to 3 visits PRN for symptoms related to diagnosis    Notify MD if SBP > 160 or < 90; DBP > 90 or < 50; HR > 120 or < 50; Temp > 101; O2 < 88%    Ok to schedule additional visits based on staff availability and patient request on consecutive days within the home health episode.    When multiple disciplines ordered:    Start of Care occurs on Sunday - Wednesday schedule remaining discipline evaluations as ordered on separate consecutive days following the start of care.    Thursday SOC -schedule subsequent evaluations Friday and Monday the following week.     Friday - Saturday SOC - schedule subsequent discipline evaluations on consecutive days starting Monday of the following week.    For all post-discharge communication and subsequent orders please contact patient's primary care physician.     Miscellaneous   Home Oxygen:  Oxygen at 1-5 L/min nasal canula to be used:  Continuously., Assess oxygen saturation via pulse oximeter as needed for increase in SOB., and Notify physician if oxygen saturation less than 88%    Home Health Aide:  Nursing Weekly, Physical Therapy Three times weekly, Occupational Therapy Three times weekly, Medical Social Work Weekly, Respiratory Therapy Three times weekly, and Home Health Aide Daily    I certify that this  patient is confined to his home and needs intermittent skilled nursing care, physical therapy, and occupational therapy.        Viktoriya Waldron [facility]

## 2023-01-29 NOTE — PLAN OF CARE
Discharge Planning   Case Management Re-Assessment:     Patient admitted on 1-28-23  Chart reviewed  Care plan discussed with treatment team,       DME at home: BiPap, o-2  Current dispo: admitted to bed 1128.   Home Health vs SnF.  See PCP and sleep study per team rec's  Case mgt to discuss the DME bi-pap supply issues tomorrow  Also spoke with [facility] dme on call (Lizz and resp therapy Deana), they are aware of the needed equipment to be possibly replaced  Case mgt to also reach out to Dr Orona tomorrow, she is aware of some of the complicated needs of Mr Mccray.  Team Member Role and Specialty Contact Info Address Start End Comments   Delphine Orona MD General (Internal Medicine) Phone: [phone] Fax: [phone] [address] [facility] [address] 12/19/2022 - -          Transportation: limited, medi-van (for oxygen needs), lives alone.  Consults following: case mgt  Case management  to follow daily    [facility]  Discharge Reassessment    Primary Care Provider: Delphine Orona MD    Expected Discharge Date: 1/29/2023    Reassessment (most recent)       Discharge Reassessment - 01/29/23 1150          Discharge Reassessment    Assessment Type Discharge Planning Reassessment (P)      Did the patient's condition or plan change since previous assessment? No (P)      Discharge Plan discussed with: Patient (P)      Communicated RUTH with patient/caregiver Yes (P)      Discharge Plan A Home Health (P)      Discharge Plan B Skilled Nursing Facility (P)      DME Needed Upon Discharge  other (see comments) (P)    DME supplies for bi-pap    Discharge Barriers Identified Does not adhere to care plan;Other (see comments) (P)      Why the patient remains in the hospital Requires continued medical care (P)         Post-Acute Status    Post-Acute Authorization Home Health (P)      Home Health Status Pending medical clearance/testing (P)      Other Status See Comments (P)    DME  supplies    Discharge Delays None known at this time (P)

## 2023-01-29 NOTE — ASSESSMENT & PLAN NOTE
Patient with Hypoxic Respiratory failure which is Acute on chronic.  he is on home oxygen at 2 LPM. Supplemental oxygen was provided and noted-  .   Signs/symptoms of respiratory failure include- tachypnea, increased work of breathing, respiratory distress, use of accessory muscles, wheezing and dyspnea. Contributing diagnoses includes - CHF Labs and images were reviewed. Patient Has not had a recent ABG. Will treat underlying causes and adjust management of respiratory failure  - Continue BIPAP use as ordered  - Pt with issues obtaining necessary supplies for home bipap over the past 2 weeks  - RT and CM assisting with outpatient medical supplier

## 2023-01-29 NOTE — CARE UPDATE
RT called to bedside due to Pt experiencing acute SOB; RT placed on Bipap per Pt request to relax and decrease WOB.  RT will continue to monitor and titrate as tolerated.

## 2023-01-29 NOTE — PROGRESS NOTES
[facility] - Observation [address] [place] [address] Medicine  Progress Note    Patient Name: Baltazar Mccray  MRN: [id number]  Patient Class: OP- Observation   Admission Date: 1/28/2023  Length of Stay: 0 days  Attending Physician: Marilee Jackson MD  Primary Care Provider: Delphine Orona MD        Subjective:     Principal Problem:Acute on chronic respiratory failure with hypoxia and hypercapnia        HPI:  Baltazar Mccray is 62 y.o. M with A fib on Eliquis, HFpEF, CAD, HTN, HLD, GERD, COPD (on 2 L O2 at home), and seizures presenting to the ED for shortness of breath, which he reports started 2 weeks ago when he had issues with him home bipap. Pt reports he is on bipap at home but does not have a mask or filters. He has attempted to contact the supplies company without success. Since that time, he reports becoming more short of breath. He denies worsening cough or increased sputum production. He is complaint with home medications and home oxygen. He reports his oxygen levels at home have been >90% on 2L. He is able to use home O2 between 2-5L but has not increased his O2 for symptomatic support. Pt denies fever, chills, chest pain, palpitations, recent travel, leg pain or swelling, abdominal pain, N/V/D, dysuria, confusion, HA, syncope or recent sick contacts.        In the ED: Hypertensive to 175/94, sating 85% on home 2L O2, afebrile. CBC with stable anemia, CMP wnl. BNP 22. Troponin 0.019. EKG without acute changes. CXR without acute process. Patient given lasix 40 mg IV X 1 and duonebs and admitted to observation.       Overview/Hospital Course:  Pt placed in observation for acute on chronic respiratory failure. Pt was given scheduled duonebs, supplemental O2, and bipap. CM following to aid in obtaining the necessary supplies for pt's home bipap. Plan to discharge with  when stable.       Interval History: Pt seen and examined by me this morning. On my entry, pt tachypneic, in tripod position with subcostal and  supraclavicular retractions, with diffuse rales and wheezing, with home O2 via nasal cannula in place. O2 increased to 5L and RT called to bedside to place on bipap. Pt with improvement in WOB on 5L and significantly improved once on bipap. Repeat CXR without new process. Pt seen and examined again this afternoon with bipap in place and no respiratory distress, though pt reports ~1-2 hrs back on home O2 due to SOB. ABG and CTA ordered. Pt notes these episodes are similar to episodes of respiratory distress at home, which are intermittent and chronic.     Review of Systems   Constitutional:  Positive for appetite change. Negative for activity change, chills, diaphoresis and fever.   HENT:  Negative for trouble swallowing.    Eyes:  Negative for photophobia and visual disturbance.   Respiratory:  Positive for cough, shortness of breath and wheezing. Negative for chest tightness.    Cardiovascular:  Negative for chest pain, palpitations and leg swelling.   Gastrointestinal:  Negative for abdominal pain, constipation, diarrhea, nausea and vomiting.   Genitourinary:  Negative for dysuria, frequency, hematuria and urgency.   Musculoskeletal:  Negative for arthralgias and gait problem.   Skin:  Negative for color change and rash.   Neurological:  Negative for dizziness, syncope, weakness, light-headedness, numbness and headaches.   Hematological:  Bruises/bleeds easily.   Psychiatric/Behavioral:  Negative for agitation and confusion. The patient is not nervous/anxious.    Objective:     Vital Signs (Most Recent):  Temp: 98.1 °F (36.7 °C) (01/29/23 1112)  Pulse: 88 (01/29/23 1206)  Resp: 17 (01/29/23 1206)  BP: 107/70 (01/29/23 1112)  SpO2: 98 % (01/29/23 1206) Vital Signs (24h Range):  Temp:  [97.9 °F (36.6 °C)-98.9 °F (37.2 °C)] 98.1 °F (36.7 °C)  Pulse:  [72-95] 88  Resp:  [16-26] 17  SpO2:  [90 %-98 %] 98 %  BP: (106-138)/(63-80) 107/70     Weight: 63.6 kg (140 lb 3.4 oz)  Body mass index is 22.63  kg/m².    Intake/Output Summary (Last 24 hours) at 1/29/2023 1518  Last data filed at 1/29/2023 0900  Gross per 24 hour   Intake 50 ml   Output 600 ml   Net -550 ml      Physical Exam  Vitals and nursing note reviewed.   Constitutional:       General: He is not in acute distress.     Appearance: He is well-developed. He is ill-appearing and toxic-appearing.   HENT:      Head: Normocephalic and atraumatic.      Mouth/Throat:      Mouth: Mucous membranes are moist.   Eyes:      Extraocular Movements: Extraocular movements intact.      Conjunctiva/sclera: Conjunctivae normal.      Pupils: Pupils are equal, round, and reactive to light.   Cardiovascular:      Rate and Rhythm: Normal rate and regular rhythm.      Heart sounds: Normal heart sounds.      Comments: No JVD  Pulmonary:      Effort: Tachypnea (resolved) and accessory muscle usage (resolved) present.      Breath sounds: Wheezing and rales present.   Abdominal:      General: Bowel sounds are normal. There is no distension.      Palpations: Abdomen is soft.      Tenderness: There is no abdominal tenderness.   Musculoskeletal:         General: No tenderness. Normal range of motion.      Cervical back: Normal range of motion and neck supple.      Right lower leg: No edema.      Left lower leg: No edema.   Skin:     General: Skin is warm and dry.      Capillary Refill: Capillary refill takes less than 2 seconds.      Findings: No rash.   Neurological:      Mental Status: He is alert and oriented to person, place, and time.      Cranial Nerves: No cranial nerve deficit.      Sensory: No sensory deficit.      Coordination: Coordination normal.   Psychiatric:         Behavior: Behavior normal.         Thought Content: Thought content normal.         Judgment: Judgment normal.       Significant Labs: All pertinent labs within the past 24 hours have been reviewed.  BMP:   Recent Labs   Lab 01/29/23  0352   GLU 79      K 4.1   CL 97   CO2 31*   BUN 13   CREATININE  0.8   CALCIUM 9.4   MG 1.9     CBC:   Recent Labs   Lab 01/28/23  0427 01/29/23  0352   WBC 7.44 7.42   HGB 11.2* 11.3*   HCT 39.2* 39.6*    335       Significant Imaging: I have reviewed all pertinent imaging results/findings within the past 24 hours.    X-Ray Chest PA And Lateral  Narrative: EXAMINATION:  XR CHEST PA AND LATERAL    CLINICAL HISTORY:  SOB;    TECHNIQUE:  PA and lateral views of the chest were performed.    COMPARISON:  Chest radiograph from 01/28/2023.    FINDINGS:  Mediastinal structures are midline.  Normal cardiomediastinal silhouette.  Atherosclerotic plaque at the aortic arch.    No significant change in cardiopulmonary status.  Lungs are well aerated.  No focal consolidation.  No pneumothorax.  No significant pleural fluid.    No free air in the upper abdomen.  Osseous structures appear intact.  Impression: No new intrathoracic abnormality.    Electronically signed by: Don Perez  Date:    01/29/2023  Time:    13:08         Assessment/Plan:      * Acute on chronic respiratory failure with hypoxia and hypercapnia  Patient with Hypoxic Respiratory failure which is Acute on chronic.  he is on home oxygen at 2 LPM. Supplemental oxygen was provided and noted-  .   Signs/symptoms of respiratory failure include- tachypnea, increased work of breathing, respiratory distress, use of accessory muscles, wheezing and dyspnea. Contributing diagnoses includes - CHF Labs and images were reviewed. Patient Has not had a recent ABG. Will treat underlying causes and adjust management of respiratory failure    - CTA pending  - ABG pending  - Continue BIPAP use as ordered  - Pt with issues obtaining necessary supplies for home bipap over the past 2 weeks  - RT and CM assisting with outpatient medical supplier    Coronary artery disease of native artery of native heart with stable angina pectoris  - medically managed with lasix, statin, ARB, BP control  - no chest pain, troponin negative, ECG without  acute ischemia    BiPAP (biphasic positive airway pressure) dependence  Obstructive Sleep Apnea  - Pt will need outpatient sleep study at discharge    Atrial fibrillation  Patient with Long standing persistent (>12 months) atrial fibrillation which is controlled currently with Calcium Channel Blocker. Patient is currently in sinus rhythm.PTPWZ2FDCt Score: 1.  Anticoagulation indicated. Anticoagulation done with eliquis.  - continue home diltiazem 180 mg daily    Chronic diastolic heart failure  Patient presents with SOB, desatted to 85% on home 2L. Reports he has not been able to get a CPAP mask for the past two weeks.   - currently 88% on 2L  - BNP WNL, CXR consistent with previous  - S/p 40 mg IV lasix in the ED     Results for orders placed during the hospital encounter of 11/18/22  Echo  Interpretation Summary  · The left ventricle is normal in size with normal systolic function.  · The estimated ejection fraction is 53%.  · There are segmental left ventricular wall motion abnormalities.  · Normal left ventricular diastolic function.  · There is abnormal septal wall motion.  · Normal right ventricular size with normal right ventricular systolic function.  · Normal central venous pressure (3 mmHg).  · There is a small right pleural effusion.    Chronic obstructive pulmonary disease  - no evidence of exacerbation  - continue home inhalers  - duonebs PRN    Dependence on nicotine from cigarettes  - Pt reports intermittent use of cigarettes   - Dangers of cigarette smoking (especially given concurrent oxygen use) were reviewed in detail for ~12 minutes and pt was encouraged to quit.  - Nicotine replacement options discussed and ordered  - Plan to provide nicotine replacement at discharge     Essential hypertension  - elevated on admit-  - continue home diltiazem 180 mg daily, increase losartan from 25 mg to 50 mg daily  - cardiac diet      VTE Risk Mitigation (From admission, onward)           Ordered     apixaban  tablet 5 mg  2 times daily         01/28/23 0941     IP VTE HIGH RISK PATIENT  Once         01/28/23 0941     Place sequential compression device  Until discontinued         01/28/23 0941                    Discharge Planning   RUTH: 1/29/2023     Code Status: Full Code   Is the patient medically ready for discharge?: No    Reason for patient still in hospital (select all that apply): Patient unstable, Patient trending condition and Treatment  Discharge Plan A: Home Health   Discharge Delays: None known at this time              Viktoriya Waldron PA-C  Department of Hospital Medicine   [facility] - Observation 11H

## 2023-01-29 NOTE — SUBJECTIVE & OBJECTIVE
Interval History: Pt seen and examined by me this morning. On my entry, pt tachypneic, in tripod position with subcostal and supraclavicular retractions, with diffuse rales and wheezing, with home O2 via nasal cannula in place. O2 increased to 5L and RT called to bedside to place on bipap. Pt with improvement in WOB on 5L and significantly improved once on bipap. Repeat CXR without new process. Pt seen and examined again this afternoon with bipap in place and no respiratory distress, though pt reports ~1-2 hrs back on home O2. Pt notes these episodes are similar to episodes of respiratory distress at home, which are intermittent and chronic.     Review of Systems   Constitutional:  Positive for appetite change. Negative for activity change, chills, diaphoresis and fever.   HENT:  Negative for trouble swallowing.    Eyes:  Negative for photophobia and visual disturbance.   Respiratory:  Positive for cough, shortness of breath and wheezing. Negative for chest tightness.    Cardiovascular:  Negative for chest pain, palpitations and leg swelling.   Gastrointestinal:  Negative for abdominal pain, constipation, diarrhea, nausea and vomiting.   Genitourinary:  Negative for dysuria, frequency, hematuria and urgency.   Musculoskeletal:  Negative for arthralgias and gait problem.   Skin:  Negative for color change and rash.   Neurological:  Negative for dizziness, syncope, weakness, light-headedness, numbness and headaches.   Hematological:  Bruises/bleeds easily.   Psychiatric/Behavioral:  Negative for agitation and confusion. The patient is not nervous/anxious.    Objective:     Vital Signs (Most Recent):  Temp: 98.1 °F (36.7 °C) (01/29/23 1112)  Pulse: 88 (01/29/23 1206)  Resp: 17 (01/29/23 1206)  BP: 107/70 (01/29/23 1112)  SpO2: 98 % (01/29/23 1206) Vital Signs (24h Range):  Temp:  [97.9 °F (36.6 °C)-98.9 °F (37.2 °C)] 98.1 °F (36.7 °C)  Pulse:  [72-95] 88  Resp:  [16-26] 17  SpO2:  [90 %-98 %] 98 %  BP:  (106-138)/(63-80) 107/70     Weight: 63.6 kg (140 lb 3.4 oz)  Body mass index is 22.63 kg/m².    Intake/Output Summary (Last 24 hours) at 1/29/2023 1518  Last data filed at 1/29/2023 0900  Gross per 24 hour   Intake 50 ml   Output 600 ml   Net -550 ml      Physical Exam  Vitals and nursing note reviewed.   Constitutional:       General: He is not in acute distress.     Appearance: He is well-developed. He is ill-appearing and toxic-appearing.   HENT:      Head: Normocephalic and atraumatic.      Mouth/Throat:      Mouth: Mucous membranes are moist.   Eyes:      Extraocular Movements: Extraocular movements intact.      Conjunctiva/sclera: Conjunctivae normal.      Pupils: Pupils are equal, round, and reactive to light.   Cardiovascular:      Rate and Rhythm: Normal rate and regular rhythm.      Heart sounds: Normal heart sounds.      Comments: No JVD  Pulmonary:      Effort: Tachypnea (resolved) and accessory muscle usage (resolved) present.      Breath sounds: Wheezing and rales present.   Abdominal:      General: Bowel sounds are normal. There is no distension.      Palpations: Abdomen is soft.      Tenderness: There is no abdominal tenderness.   Musculoskeletal:         General: No tenderness. Normal range of motion.      Cervical back: Normal range of motion and neck supple.      Right lower leg: No edema.      Left lower leg: No edema.   Skin:     General: Skin is warm and dry.      Capillary Refill: Capillary refill takes less than 2 seconds.      Findings: No rash.   Neurological:      Mental Status: He is alert and oriented to person, place, and time.      Cranial Nerves: No cranial nerve deficit.      Sensory: No sensory deficit.      Coordination: Coordination normal.   Psychiatric:         Behavior: Behavior normal.         Thought Content: Thought content normal.         Judgment: Judgment normal.       Significant Labs: All pertinent labs within the past 24 hours have been reviewed.  BMP:   Recent Labs    Lab 01/29/23  0352   GLU 79      K 4.1   CL 97   CO2 31*   BUN 13   CREATININE 0.8   CALCIUM 9.4   MG 1.9     CBC:   Recent Labs   Lab 01/28/23  0427 01/29/23  0352   WBC 7.44 7.42   HGB 11.2* 11.3*   HCT 39.2* 39.6*    335       Significant Imaging: I have reviewed all pertinent imaging results/findings within the past 24 hours.    X-Ray Chest PA And Lateral  Narrative: EXAMINATION:  XR CHEST PA AND LATERAL    CLINICAL HISTORY:  SOB;    TECHNIQUE:  PA and lateral views of the chest were performed.    COMPARISON:  Chest radiograph from 01/28/2023.    FINDINGS:  Mediastinal structures are midline.  Normal cardiomediastinal silhouette.  Atherosclerotic plaque at the aortic arch.    No significant change in cardiopulmonary status.  Lungs are well aerated.  No focal consolidation.  No pneumothorax.  No significant pleural fluid.    No free air in the upper abdomen.  Osseous structures appear intact.  Impression: No new intrathoracic abnormality.    Electronically signed by: Don Perez  Date:    01/29/2023  Time:    13:08

## 2023-01-29 NOTE — HOSPITAL COURSE
Pt placed in observation for acute on chronic respiratory failure. Pt was given scheduled duonebs, supplemental O2, and bipap. Multidisciplinary team aided in obtaining the necessary supplies for pt's home bipap. All questions were answered. Patient acknowledged understanding of discharge instructions and feels safe to discharge home with home health services. Patient was discharged on 1/30/23 in stable condition with pulmonology follow-up on 1/31/23.

## 2023-01-29 NOTE — ASSESSMENT & PLAN NOTE
Patient with Hypoxic Respiratory failure which is Acute on chronic.  he is on home oxygen at 2 LPM. Supplemental oxygen was provided and noted-  .   Signs/symptoms of respiratory failure include- tachypnea, increased work of breathing, respiratory distress, use of accessory muscles, wheezing and dyspnea. Contributing diagnoses includes - CHF Labs and images were reviewed. Patient Has not had a recent ABG. Will treat underlying causes and adjust management of respiratory failure    - CTA negative for PE and consistent with chronic changes  - Pt with issues obtaining necessary supplies for home bipap over the past 2 weeks  - Pt provided required supplies for home bipap prior to discharge  - Pulmonology follow-up tomorrow

## 2023-01-30 VITALS
BODY MASS INDEX: 21.72 KG/M2 | TEMPERATURE: 98 F | SYSTOLIC BLOOD PRESSURE: 124 MMHG | HEIGHT: 66 IN | OXYGEN SATURATION: 96 % | HEART RATE: 96 BPM | DIASTOLIC BLOOD PRESSURE: 75 MMHG | WEIGHT: 135.13 LBS | RESPIRATION RATE: 16 BRPM

## 2023-01-30 DIAGNOSIS — G47.33 OSA (OBSTRUCTIVE SLEEP APNEA): Primary | ICD-10-CM

## 2023-01-30 LAB
ANION GAP SERPL CALC-SCNC: 9 MMOL/L (ref 8–16)
BASOPHILS # BLD AUTO: 0.09 K/UL (ref 0–0.2)
BASOPHILS NFR BLD: 1.1 % (ref 0–1.9)
BUN SERPL-MCNC: 15 MG/DL (ref 6–20)
CALCIUM SERPL-MCNC: 9.4 MG/DL (ref 8.7–10.5)
CHLORIDE SERPL-SCNC: 95 MMOL/L (ref 95–110)
CO2 SERPL-SCNC: 32 MMOL/L (ref 23–29)
CREAT SERPL-MCNC: 0.9 MG/DL (ref 0.5–1.4)
DIFFERENTIAL METHOD: ABNORMAL
EOSINOPHIL # BLD AUTO: 0.5 K/UL (ref 0–0.5)
EOSINOPHIL NFR BLD: 5.7 % (ref 0–8)
ERYTHROCYTE [DISTWIDTH] IN BLOOD BY AUTOMATED COUNT: 17.1 % (ref 11.5–14.5)
EST. GFR  (NO RACE VARIABLE): >60 ML/MIN/1.73 M^2
GLUCOSE SERPL-MCNC: 83 MG/DL (ref 70–110)
HCT VFR BLD AUTO: 38.3 % (ref 40–54)
HGB BLD-MCNC: 10.9 G/DL (ref 14–18)
IMM GRANULOCYTES # BLD AUTO: 0.01 K/UL (ref 0–0.04)
IMM GRANULOCYTES NFR BLD AUTO: 0.1 % (ref 0–0.5)
LYMPHOCYTES # BLD AUTO: 2.6 K/UL (ref 1–4.8)
LYMPHOCYTES NFR BLD: 33.1 % (ref 18–48)
MAGNESIUM SERPL-MCNC: 1.8 MG/DL (ref 1.6–2.6)
MCH RBC QN AUTO: 24.4 PG (ref 27–31)
MCHC RBC AUTO-ENTMCNC: 28.5 G/DL (ref 32–36)
MCV RBC AUTO: 86 FL (ref 82–98)
MONOCYTES # BLD AUTO: 0.8 K/UL (ref 0.3–1)
MONOCYTES NFR BLD: 9.6 % (ref 4–15)
NEUTROPHILS # BLD AUTO: 4 K/UL (ref 1.8–7.7)
NEUTROPHILS NFR BLD: 50.4 % (ref 38–73)
NRBC BLD-RTO: 0 /100 WBC
PHOSPHATE SERPL-MCNC: 3.4 MG/DL (ref 2.7–4.5)
PLATELET # BLD AUTO: 347 K/UL (ref 150–450)
PMV BLD AUTO: 10.6 FL (ref 9.2–12.9)
POTASSIUM SERPL-SCNC: 4.3 MMOL/L (ref 3.5–5.1)
RBC # BLD AUTO: 4.46 M/UL (ref 4.6–6.2)
SODIUM SERPL-SCNC: 136 MMOL/L (ref 136–145)
WBC # BLD AUTO: 7.94 K/UL (ref 3.9–12.7)

## 2023-01-30 PROCEDURE — 36415 COLL VENOUS BLD VENIPUNCTURE: CPT

## 2023-01-30 PROCEDURE — 99239 PR HOSPITAL DISCHARGE DAY,>30 MIN: ICD-10-PCS | Mod: ,,,

## 2023-01-30 PROCEDURE — 99239 HOSP IP/OBS DSCHRG MGMT >30: CPT | Mod: ,,,

## 2023-01-30 PROCEDURE — 99900035 HC TECH TIME PER 15 MIN (STAT)

## 2023-01-30 PROCEDURE — 94640 AIRWAY INHALATION TREATMENT: CPT | Mod: XB

## 2023-01-30 PROCEDURE — 25000242 PHARM REV CODE 250 ALT 637 W/ HCPCS

## 2023-01-30 PROCEDURE — S4991 NICOTINE PATCH NONLEGEND: HCPCS

## 2023-01-30 PROCEDURE — 94761 N-INVAS EAR/PLS OXIMETRY MLT: CPT

## 2023-01-30 PROCEDURE — 83735 ASSAY OF MAGNESIUM: CPT

## 2023-01-30 PROCEDURE — G0378 HOSPITAL OBSERVATION PER HR: HCPCS

## 2023-01-30 PROCEDURE — 25000003 PHARM REV CODE 250

## 2023-01-30 PROCEDURE — 85025 COMPLETE CBC W/AUTO DIFF WBC: CPT

## 2023-01-30 PROCEDURE — 80048 BASIC METABOLIC PNL TOTAL CA: CPT

## 2023-01-30 PROCEDURE — 27000221 HC OXYGEN, UP TO 24 HOURS

## 2023-01-30 PROCEDURE — 84100 ASSAY OF PHOSPHORUS: CPT

## 2023-01-30 RX ORDER — ESCITALOPRAM OXALATE 10 MG/1
10 TABLET ORAL DAILY
Qty: 30 TABLET | Refills: 11 | Status: SHIPPED | OUTPATIENT
Start: 2023-01-30 | End: 2023-02-01 | Stop reason: SDUPTHER

## 2023-01-30 RX ORDER — FUROSEMIDE 20 MG/1
20 TABLET ORAL DAILY
Qty: 90 TABLET | Refills: 3 | Status: SHIPPED | OUTPATIENT
Start: 2023-01-30 | End: 2023-03-31

## 2023-01-30 RX ORDER — LORAZEPAM 0.5 MG/1
0.5 TABLET ORAL EVERY 6 HOURS PRN
Qty: 15 TABLET | Refills: 0 | Status: SHIPPED | OUTPATIENT
Start: 2023-01-30 | End: 2023-02-03

## 2023-01-30 RX ORDER — IBUPROFEN 200 MG
1 TABLET ORAL DAILY
Qty: 28 PATCH | Refills: 11 | Status: SHIPPED | OUTPATIENT
Start: 2023-01-30

## 2023-01-30 RX ADMIN — APIXABAN 5 MG: 5 TABLET, FILM COATED ORAL at 09:01

## 2023-01-30 RX ADMIN — IPRATROPIUM BROMIDE AND ALBUTEROL SULFATE 3 ML: .5; 3 SOLUTION RESPIRATORY (INHALATION) at 01:01

## 2023-01-30 RX ADMIN — FERROUS SULFATE TAB 325 MG (65 MG ELEMENTAL FE) 1 EACH: 325 (65 FE) TAB at 09:01

## 2023-01-30 RX ADMIN — BUDESONIDE 0.5 MG: 0.5 INHALANT ORAL at 09:01

## 2023-01-30 RX ADMIN — LOSARTAN POTASSIUM 50 MG: 50 TABLET, FILM COATED ORAL at 09:01

## 2023-01-30 RX ADMIN — PANTOPRAZOLE SODIUM 40 MG: 40 TABLET, DELAYED RELEASE ORAL at 09:01

## 2023-01-30 RX ADMIN — IPRATROPIUM BROMIDE AND ALBUTEROL SULFATE 3 ML: .5; 3 SOLUTION RESPIRATORY (INHALATION) at 04:01

## 2023-01-30 RX ADMIN — FOLIC ACID 1 MG: 1 TABLET ORAL at 09:01

## 2023-01-30 RX ADMIN — THIAMINE HCL TAB 100 MG 100 MG: 100 TAB at 09:01

## 2023-01-30 RX ADMIN — ATORVASTATIN CALCIUM 40 MG: 40 TABLET, FILM COATED ORAL at 09:01

## 2023-01-30 RX ADMIN — IPRATROPIUM BROMIDE AND ALBUTEROL SULFATE 3 ML: .5; 3 SOLUTION RESPIRATORY (INHALATION) at 09:01

## 2023-01-30 RX ADMIN — FUROSEMIDE 20 MG: 20 TABLET ORAL at 09:01

## 2023-01-30 RX ADMIN — DILTIAZEM HYDROCHLORIDE 180 MG: 60 TABLET, FILM COATED ORAL at 09:01

## 2023-01-30 RX ADMIN — Medication 1 PATCH: at 09:01

## 2023-01-30 RX ADMIN — ACETAMINOPHEN 650 MG: 325 TABLET ORAL at 11:01

## 2023-01-30 NOTE — PLAN OF CARE
Problem: Adult Inpatient Plan of Care  Goal: Plan of Care Review  Outcome: Met  Goal: Patient-Specific Goal (Individualized)  Outcome: Met  Goal: Absence of Hospital-Acquired Illness or Injury  1/30/2023 1714 by Cristina Sigala RN  Outcome: Met  1/30/2023 1320 by Cristina Sigala RN  Outcome: Ongoing, Progressing  Goal: Optimal Comfort and Wellbeing  1/30/2023 1714 by Cristina Sigala RN  Outcome: Met  1/30/2023 1320 by Cristina Sigala RN  Outcome: Ongoing, Progressing  Goal: Readiness for Transition of Care  Outcome: Met     Problem: Infection  Goal: Absence of Infection Signs and Symptoms  Outcome: Met

## 2023-01-30 NOTE — PLAN OF CARE
01/30/23 1525   Post-Acute Status   Post-Acute Authorization Home Health   Home Health Status Referrals Sent     Pt is expected to discharge home with home health. SW sent referrals via [facility] and is waiting for an accepting agency.    BILL will continue to follow up.    Ladonna Gustafson LMSW  [facility]  Ext. [phone]

## 2023-01-30 NOTE — PLAN OF CARE
01/30/23 1617   Post-Acute Status   Post-Acute Authorization Home Health   Home Health Status Set-up Complete/Auth obtained     BILL received notification that pt has been accepted with [facility] and they will start services on Wed., 2/1.    UPDATE    BILL arranged wheelchair transport to [address] [place] [address]., [place] [address] via [facility]. Requested  time is 6:30PM. Requested  time does not guarantee arrival time.      Ladonna Gustafson LMSW  [facility]  Ext. [phone]

## 2023-01-31 ENCOUNTER — HOSPITAL ENCOUNTER (OUTPATIENT)
Dept: PULMONOLOGY | Facility: CLINIC | Age: 63
Discharge: HOME | End: 2023-01-31
Payer: MEDICARE

## 2023-01-31 ENCOUNTER — OFFICE VISIT (OUTPATIENT)
Dept: PULMONOLOGY | Facility: CLINIC | Age: 63
End: 2023-01-31
Payer: MEDICARE

## 2023-01-31 VITALS
DIASTOLIC BLOOD PRESSURE: 70 MMHG | HEART RATE: 91 BPM | SYSTOLIC BLOOD PRESSURE: 119 MMHG | WEIGHT: 137.13 LBS | OXYGEN SATURATION: 80 % | BODY MASS INDEX: 22.04 KG/M2 | HEIGHT: 66 IN

## 2023-01-31 DIAGNOSIS — J96.22 ACUTE ON CHRONIC RESPIRATORY FAILURE WITH HYPOXIA AND HYPERCAPNIA: ICD-10-CM

## 2023-01-31 DIAGNOSIS — J96.21 ACUTE ON CHRONIC RESPIRATORY FAILURE WITH HYPOXIA AND HYPERCAPNIA: ICD-10-CM

## 2023-01-31 DIAGNOSIS — J42 CHRONIC BRONCHITIS, UNSPECIFIED CHRONIC BRONCHITIS TYPE: Chronic | ICD-10-CM

## 2023-01-31 DIAGNOSIS — J99 RESPIRATORY DISORDERS IN DISEASES CLASSIFIED ELSEWHERE: ICD-10-CM

## 2023-01-31 DIAGNOSIS — J44.1 COPD EXACERBATION: Primary | ICD-10-CM

## 2023-01-31 DIAGNOSIS — J44.9 CHRONIC OBSTRUCTIVE PULMONARY DISEASE, UNSPECIFIED COPD TYPE: ICD-10-CM

## 2023-01-31 DIAGNOSIS — F17.219 CIGARETTE NICOTINE DEPENDENCE WITH NICOTINE-INDUCED DISORDER: ICD-10-CM

## 2023-01-31 LAB
DLCO ADJ PRE: 3.61 ML/(MIN*MMHG) (ref 22.23–36.08)
DLCO SINGLE BREATH LLN: 22.23
DLCO SINGLE BREATH PRE REF: 10.9 %
DLCO SINGLE BREATH REF: 29.15
DLCOC SBVA LLN: 2.91
DLCOC SBVA PRE REF: 39 %
DLCOC SBVA REF: 4.09
DLCOC SINGLE BREATH LLN: 22.23
DLCOC SINGLE BREATH PRE REF: 12.4 %
DLCOC SINGLE BREATH REF: 29.15
DLCOCSBVAULN: 5.27
DLCOCSINGLEBREATHULN: 36.08
DLCOSINGLEBREATHULN: 36.08
DLCOVA LLN: 2.91
DLCOVA PRE REF: 34.2 %
DLCOVA PRE: 1.4 ML/(MIN*MMHG*L) (ref 2.91–5.27)
DLCOVA REF: 4.09
DLCOVAULN: 5.27
DLVAADJ PRE: 1.59 ML/(MIN*MMHG*L) (ref 2.91–5.27)
ERV LLN: -16448.81
ERV PRE REF: 23.8 %
ERV REF: 1.19
ERVULN: ABNORMAL
FEF 25 75 LLN: 1.07
FEF 25 75 PRE REF: 6.7 %
FEF 25 75 REF: 2.57
FEV05 LLN: 1.68
FEV05 REF: 2.81
FEV1 FVC LLN: 66
FEV1 FVC PRE REF: 59.9 %
FEV1 FVC REF: 78
FEV1 LLN: 2.2
FEV1 PRE REF: 17.4 %
FEV1 REF: 3.04
FRCPLETH LLN: 2.62
FRCPLETH PREREF: 150.1 %
FRCPLETH REF: 3.61
FRCPLETHULN: 4.6
FVC LLN: 2.89
FVC PRE REF: 29.1 %
FVC REF: 3.9
IVC PRE: 1.22 L (ref 2.89–4.92)
IVC SINGLE BREATH LLN: 2.89
IVC SINGLE BREATH PRE REF: 31.3 %
IVC SINGLE BREATH REF: 3.9
IVCSINGLEBREATHULN: 4.92
LLN IC: -9999996.68
PEF LLN: 5.76
PEF PRE REF: 40.1 %
PEF REF: 8.42
PHYSICIAN COMMENT: ABNORMAL
PRE DLCO: 3.16 ML/(MIN*MMHG) (ref 22.23–36.08)
PRE ERV: 0.28 L (ref -16448.81–16451.19)
PRE FEF 25 75: 0.17 L/S (ref 1.07–4.72)
PRE FET 100: 7.46 SEC
PRE FEV05 REF: 14.8 %
PRE FEV1 FVC: 46.69 % (ref 66.31–88.23)
PRE FEV1: 0.53 L (ref 2.2–3.83)
PRE FEV5: 0.42 L (ref 1.68–3.95)
PRE FRC PL: 5.42 L (ref 2.62–4.6)
PRE FVC: 1.14 L (ref 2.89–4.92)
PRE IC: 0.93 L (ref -9999996.68–#######.####)
PRE PEF: 3.38 L/S (ref 5.76–11.07)
PRE REF IC: 28 %
PRE RV: 5.14 L (ref 1.74–3.09)
PRE TLC: 6.35 L (ref 5.97–8.28)
PRE VTG: 5.57 L
RAW PRE REF: 362.5 %
RAW PRE: 11.09 CMH2O*S/L (ref 3.06–3.06)
RAW REF: 3.06
REF IC: 3.32
RV LLN: 1.74
RV PRE REF: 212.3 %
RV REF: 2.42
RVTLC LLN: 28
RVTLC PRE REF: 216.5 %
RVTLC PRE: 80.87 % (ref 28.38–46.34)
RVTLC REF: 37
RVTLCULN: 46
RVULN: 3.09
SGAW PRE REF: 19 %
SGAW PRE: 0.02 1/(CMH2O*S) (ref 0.08–0.08)
SGAW REF: 0.08
TLC LLN: 5.97
TLC PRE REF: 89.1 %
TLC REF: 7.13
TLC ULN: 8.28
ULN IC: ABNORMAL
VA PRE: 2.26 L (ref 6.98–6.98)
VA SINGLE BREATH LLN: 6.98
VA SINGLE BREATH PRE REF: 32.4 %
VA SINGLE BREATH REF: 6.98
VASINGLEBREATHULN: 6.98
VC LLN: 2.89
VC PRE REF: 31.1 %
VC PRE: 1.22 L (ref 2.89–4.92)
VC REF: 3.9
VC ULN: 4.92

## 2023-01-31 PROCEDURE — 94729 DIFFUSING CAPACITY: CPT | Mod: S$GLB,,, | Performed by: INTERNAL MEDICINE

## 2023-01-31 PROCEDURE — 1111F DSCHRG MED/CURRENT MED MERGE: CPT | Mod: CPTII,S$GLB,, | Performed by: INTERNAL MEDICINE

## 2023-01-31 PROCEDURE — 3074F PR MOST RECENT SYSTOLIC BLOOD PRESSURE < 130 MM HG: ICD-10-PCS | Mod: CPTII,S$GLB,, | Performed by: INTERNAL MEDICINE

## 2023-01-31 PROCEDURE — 94729 PR C02/MEMBANE DIFFUSE CAPACITY: ICD-10-PCS | Mod: S$GLB,,, | Performed by: INTERNAL MEDICINE

## 2023-01-31 PROCEDURE — 94010 BREATHING CAPACITY TEST: CPT | Mod: S$GLB,,, | Performed by: INTERNAL MEDICINE

## 2023-01-31 PROCEDURE — 94010 BREATHING CAPACITY TEST: ICD-10-PCS | Mod: S$GLB,,, | Performed by: INTERNAL MEDICINE

## 2023-01-31 PROCEDURE — 3074F SYST BP LT 130 MM HG: CPT | Mod: CPTII,S$GLB,, | Performed by: INTERNAL MEDICINE

## 2023-01-31 PROCEDURE — 1111F PR DISCHARGE MEDS RECONCILED W/ CURRENT OUTPATIENT MED LIST: ICD-10-PCS | Mod: CPTII,S$GLB,, | Performed by: INTERNAL MEDICINE

## 2023-01-31 PROCEDURE — 99999 PR PBB SHADOW E&M-EST. PATIENT-LVL V: ICD-10-PCS | Mod: PBBFAC,,, | Performed by: INTERNAL MEDICINE

## 2023-01-31 PROCEDURE — 3008F PR BODY MASS INDEX (BMI) DOCUMENTED: ICD-10-PCS | Mod: CPTII,S$GLB,, | Performed by: INTERNAL MEDICINE

## 2023-01-31 PROCEDURE — 3008F BODY MASS INDEX DOCD: CPT | Mod: CPTII,S$GLB,, | Performed by: INTERNAL MEDICINE

## 2023-01-31 PROCEDURE — 94726 PLETHYSMOGRAPHY LUNG VOLUMES: CPT | Mod: S$GLB,,, | Performed by: INTERNAL MEDICINE

## 2023-01-31 PROCEDURE — 3078F DIAST BP <80 MM HG: CPT | Mod: CPTII,S$GLB,, | Performed by: INTERNAL MEDICINE

## 2023-01-31 PROCEDURE — 94726 PULM FUNCT TST PLETHYSMOGRAP: ICD-10-PCS | Mod: S$GLB,,, | Performed by: INTERNAL MEDICINE

## 2023-01-31 PROCEDURE — 99999 PR PBB SHADOW E&M-EST. PATIENT-LVL V: CPT | Mod: PBBFAC,,, | Performed by: INTERNAL MEDICINE

## 2023-01-31 PROCEDURE — 3078F PR MOST RECENT DIASTOLIC BLOOD PRESSURE < 80 MM HG: ICD-10-PCS | Mod: CPTII,S$GLB,, | Performed by: INTERNAL MEDICINE

## 2023-01-31 PROCEDURE — 1159F PR MEDICATION LIST DOCUMENTED IN MEDICAL RECORD: ICD-10-PCS | Mod: CPTII,S$GLB,, | Performed by: INTERNAL MEDICINE

## 2023-01-31 PROCEDURE — 4010F PR ACE/ARB THEARPY RXD/TAKEN: ICD-10-PCS | Mod: CPTII,S$GLB,, | Performed by: INTERNAL MEDICINE

## 2023-01-31 PROCEDURE — 99214 PR OFFICE/OUTPT VISIT, EST, LEVL IV, 30-39 MIN: ICD-10-PCS | Mod: 25,S$GLB,, | Performed by: INTERNAL MEDICINE

## 2023-01-31 PROCEDURE — 1159F MED LIST DOCD IN RCRD: CPT | Mod: CPTII,S$GLB,, | Performed by: INTERNAL MEDICINE

## 2023-01-31 PROCEDURE — 4010F ACE/ARB THERAPY RXD/TAKEN: CPT | Mod: CPTII,S$GLB,, | Performed by: INTERNAL MEDICINE

## 2023-01-31 PROCEDURE — 99214 OFFICE O/P EST MOD 30 MIN: CPT | Mod: 25,S$GLB,, | Performed by: INTERNAL MEDICINE

## 2023-01-31 RX ORDER — PREDNISONE 10 MG/1
10 TABLET ORAL DAILY
Qty: 30 TABLET | Refills: 3 | Status: SHIPPED | OUTPATIENT
Start: 2023-01-31 | End: 2023-04-06 | Stop reason: SDUPTHER

## 2023-01-31 NOTE — PLAN OF CARE
[facility] - Observation [facility]  Discharge Final Note    Primary Care Provider: Delphine Orona MD    Expected Discharge Date: 1/30/2023    Patient discharged to home via ambulance transportation.     Patient's bedside nurse and patient notified of the above.      Final Discharge Note (most recent)       Final Note - 01/31/23 0951          Final Note    Assessment Type Final Discharge Note (P)      Anticipated Discharge Disposition Home-Health Care Svc (P)         Post-Acute Status    Post-Acute Authorization Home Health (P)      Home Health Status Set-up Complete/Auth obtained (P)                      Important Message from Medicare                 Future Appointments   Date Time Provider Department Center   1/31/2023 11:45 AM PULMONARY FUNCTION [facility] [address]   1/31/2023  2:00 PM PULMONARY FUNCTION [facility] [address]   2/1/2023 11:30 AM Delphine Orona MD [facility] MED CLN [address] [facility]   2/7/2023  8:00 AM Susan Rae, NP [address]   2/9/2023  9:30 AM NURSE, [facility] MED CLN [address] [facility]   2/9/2023 10:00 AM Remedios Hsieh CTTS [facility] SMOKE [address]   2/20/2023 10:00 AM Delphine Orona MD [facility] MED CLN [address] PC[facility]   4/24/2023  1:30 PM Hung Malave MD [facility] SLEEP [facility]        scheduled post-discharge follow-up appointment and information added to AVS.     Patient has been accepted with [facility].    Ladonna Gustafson LMSW  [facility]  Ext. [phone]

## 2023-01-31 NOTE — DISCHARGE SUMMARY
[facility] [address] [place] [address] Medicine  Discharge Summary      Patient Name: Baltazar Mccray  MRN: [id number]  THEODORE: [id number]  Patient Class: OP- Observation  Admission Date: 1/28/2023  Hospital Length of Stay: 0 days  Discharge Date and Time: 1/30/2023  7:49 PM  Attending Physician: Carina att. providers found   Discharging Provider: Viktoriya Waldron PA-C  Primary Care Provider: Delphine Orona MD  [facility] Medicine Team: [facility] Viktoriya Waldron PA-C  Primary Care Team: [facility]    HPI:   Baltazar Mccray is 62 y.o. M with A fib on Eliquis, HFpEF, CAD, HTN, HLD, GERD, COPD (on 2 L O2 at home), and seizures presenting to the ED for shortness of breath, which he reports started 2 weeks ago when he had issues with him home bipap. Pt reports he is on bipap at home but does not have a mask or filters. He has attempted to contact the supplies company without success. Since that time, he reports becoming more short of breath. He denies worsening cough or increased sputum production. He is complaint with home medications and home oxygen. He reports his oxygen levels at home have been >90% on 2L. He is able to use home O2 between 2-5L but has not increased his O2 for symptomatic support. Pt denies fever, chills, chest pain, palpitations, recent travel, leg pain or swelling, abdominal pain, N/V/D, dysuria, confusion, HA, syncope or recent sick contacts.        In the ED: Hypertensive to 175/94, sating 85% on home 2L O2, afebrile. CBC with stable anemia, CMP wnl. BNP 22. Troponin 0.019. EKG without acute changes. CXR without acute process. Patient given lasix 40 mg IV X 1 and duonebs and admitted to observation.       * No surgery found *      Hospital Course:   Pt placed in observation for acute on chronic respiratory failure. Pt was given scheduled duonebs, supplemental O2, and bipap. Multidisciplinary team aided in obtaining the necessary supplies for pt's home bipap. All questions were answered. Patient  acknowledged understanding of discharge instructions and feels safe to discharge home with home health services. Patient was discharged on 1/30/23 in stable condition with pulmonology follow-up on 1/31/23.        Goals of Care Treatment Preferences:  Code Status: Full Code          What is most important right now is to focus on avoiding the hospital.  Accordingly, we have decided that the best plan to meet the patient's goals includes continuing with treatment.      Consults:     * Acute on chronic respiratory failure with hypoxia and hypercapnia  Patient with Hypoxic Respiratory failure which is Acute on chronic.  he is on home oxygen at 2 LPM. Supplemental oxygen was provided and noted-  .   Signs/symptoms of respiratory failure include- tachypnea, increased work of breathing, respiratory distress, use of accessory muscles, wheezing and dyspnea. Contributing diagnoses includes - CHF Labs and images were reviewed. Patient Has not had a recent ABG. Will treat underlying causes and adjust management of respiratory failure    - CTA negative for PE and consistent with chronic changes  - Pt with issues obtaining necessary supplies for home bipap over the past 2 weeks  - Pt provided required supplies for home bipap prior to discharge  - Pulmonology follow-up tomorrow       Final Active Diagnoses:    Diagnosis Date Noted POA    PRINCIPAL PROBLEM:  Acute on chronic respiratory failure with hypoxia and hypercapnia [J96.21, J96.22] 01/29/2020 Yes    LIBRA (obstructive sleep apnea) [G47.33] 01/18/2023 Yes    Coronary artery disease of native artery of native heart with stable angina pectoris [I25.118] 01/11/2023 Yes    BiPAP (biphasic positive airway pressure) dependence [Z99.89]  Not Applicable    Atrial fibrillation [I48.91] 03/05/2022 Yes    Chronic diastolic heart failure [I50.32] 05/21/2021 Yes    Chronic obstructive pulmonary disease [J44.9] 04/27/2021 Yes     Chronic    Dependence on nicotine from cigarettes  [F17.210] 08/12/2018 Yes    Essential hypertension [I10] 05/19/2017 Yes     Chronic      Problems Resolved During this Admission:       Discharged Condition: good    Disposition: Home-Health Care [place]    Follow Up:    Patient Instructions:      Ambulatory referral/consult to Outpatient Case Management   Referral Priority: Urgent Referral Type: Consultation   Referral Reason: Specialty Services Required   Number of Visits Requested: 1     Ambulatory referral/consult to [facility]   Standing Status: Future   Referral Priority: Routine Referral Type: Consultation   Referral Reason: Specialty Services Required   Number of Visits Requested: 1     Notify your health care provider if you experience any of the following:  difficulty breathing or increased cough     Notify your health care provider if you experience any of the following:  increased confusion or weakness     Notify your health care provider if you experience any of the following:  persistent dizziness, light-headedness, or visual disturbances     Notify your health care provider if you experience any of the following:  severe uncontrolled pain     Activity as tolerated     Ambulatory Request for Ready Responder Services   Standing Status: Future Standing Exp. Date: 01/30/24     Order Specific Question Answer Comments   Program referred to: COVID-19 Vaccine        Significant Diagnostic Studies: Labs:   CMP   Recent Labs   Lab 01/30/23  0303      K 4.3   CL 95   CO2 32*   GLU 83   BUN 15   CREATININE 0.9   CALCIUM 9.4   ANIONGAP 9   , CBC   Recent Labs   Lab 01/30/23  0303   WBC 7.94   HGB 10.9*   HCT 38.3*       and All labs within the past 24 hours have been reviewed  Radiology:  CTA Chest Non-Coronary (PE Studies)  Addendum: Few minimally enlarged mediastinal lymph nodes including 1.3 cm node in  the AP window on the left on axial 191 and 1.4 cm paratracheal lymph node  on axial 95     Additional impression:      Mild nonspecific mediastinal adenopathy.  Recommend follow-up.     This report was flagged in Epic as abnormal.     Electronically signed by: Taye Miller   Date:    01/29/2023   Time:    23:30  Narrative: EXAMINATION:  CTA CHEST NON CORONARY (PE STUDIES)    CLINICAL HISTORY:  Pulmonary embolism (PE) suspected, unknown D-dimer;    TECHNIQUE:  Low dose axial images, sagittal and coronal reformations were obtained from the thoracic inlet to the lung bases following the IV administration of 100 mL of Omnipaque 350.  Contrast timing was optimized to evaluate the pulmonary arteries.  MIP images were performed.    COMPARISON:  None    FINDINGS:  Pulmonary arteries:Pulmonary arteries are adequately enhanced.No filling defects to indicate pulmonary embolism.    Thoracic soft tissues: Unremarkable.    Aorta: Left-sided aortic arch.  No aneurysm or dissection.    Heart: Normal size. No effusion.    Beti/Mediastinum: No pathologic matti enlargement.    Airways: Patent.    Lungs/Pleura: Moderate diffuse emphysematous changes of the lungs.    2.4 x 0.9 cm airspace disease in the left lower lobe on sagittal 199 could represent minimal consolidation/infiltrate.  Mild scarring or atelectasis are not excluded.    Mild bilateral bronchiectasis most prominent the lower lobes.    Bilateral probable mild interstitial scarring.  Minimal interstitial infiltrate is not excluded.    There are few small subcentimeter pulmonary nodules in the right upper lobe.  Minimal nodular infiltrate or small nodules in the left lower lobe also.    For multiple solid nodules all <6 mm, Fleischner Society 2017 guidelines recommend no routine follow up for a low risk patient, or follow up with non-contrast chest CT at 12 months after discovery in a high risk patient.    Esophagus: Unremarkable.    Upper Abdomen: No abnormality of the partially imaged upper abdomen.    Bones: No acute fracture. No suspicious lytic or sclerotic lesions.  Impression: 1. No  evidence of pulmonary embolism.  2. Emphysematous changes of the lungs with mild bilateral interstitial scarring or infiltrate.  3. Mild nodular airspace disease in the left lower lobe could represent minimal consolidation/infiltrate.  Mild scarring or atelectasis are not excluded.  4. Few small lower lobe nonspecific micro nodules.    Electronically signed by: Taye Miller  Date:    01/29/2023  Time:    22:37  X-Ray Chest PA And Lateral  Narrative: EXAMINATION:  XR CHEST PA AND LATERAL    CLINICAL HISTORY:  SOB;    TECHNIQUE:  PA and lateral views of the chest were performed.    COMPARISON:  Chest radiograph from 01/28/2023.    FINDINGS:  Mediastinal structures are midline.  Normal cardiomediastinal silhouette.  Atherosclerotic plaque at the aortic arch.    No significant change in cardiopulmonary status.  Lungs are well aerated.  No focal consolidation.  No pneumothorax.  No significant pleural fluid.    No free air in the upper abdomen.  Osseous structures appear intact.  Impression: No new intrathoracic abnormality.    Electronically signed by: Don Perez  Date:    01/29/2023  Time:    13:08      Pending Diagnostic Studies:     None         Medications:  Reconciled Home Medications:      Medication List      START taking these medications    EScitalopram oxalate 10 MG tablet  Commonly known as: LEXAPRO  Take 1 tablet (10 mg total) by mouth once daily.     LORazepam 0.5 MG tablet  Commonly known as: ATIVAN  Take 1 tablet (0.5 mg total) by mouth every 6 (six) hours as needed (anxiety, air hunger).     SPIRIVA RESPIMAT 2.5 mcg/actuation inhaler  Generic drug: tiotropium bromide  Inhale 2 puffs into the lungs Daily. Controller        CONTINUE taking these medications    albuterol 90 mcg/actuation inhaler  Commonly known as: PROVENTIL/VENTOLIN HFA  Inhale 1-2 puffs into the lungs every 6 (six) hours as needed for Wheezing. Rescue     albuterol-ipratropium 2.5 mg-0.5 mg/3 mL nebulizer solution  Commonly known  as: DUO-NEB  Use 1 vial (3 mLs) by nebulization every 4 (four) hours as needed for Wheezing or Shortness of Breath. Rescue     atorvastatin 40 MG tablet  Commonly known as: LIPITOR  Take 1 tablet (40 mg total) by mouth once daily.     bismuth subsalicylate 262 mg/15 mL suspension  Commonly known as: PEPTO BISMOL  Take 30 mLs by mouth daily as needed for Indigestion.     budesonide 0.5 mg/2 mL nebulizer solution  Commonly known as: PULMICORT  Take 2 mLs (0.5 mg total) by nebulization 2 (two) times a day. Controller     CERTAVITE SENIOR 0.4 mg-300 mcg- 250 mcg Tab  Generic drug: multivit-min-FA-lycopen-lutein  Take 1 tablet by mouth once daily.     COMPACT SPACE CHAMBER-LRG MASK Spcr  Generic drug: inhalat.spacing dev,large mask  Use as directed     diltiaZEM 90 MG tablet  Commonly known as: CARDIZEM  Take 2 tablets (180 mg total) by mouth once daily.     ELIQUIS 5 mg Tab  Generic drug: apixaban  Take 1 tablet (5 mg total) by mouth 2 (two) times daily.     ferrous sulfate 325 (65 FE) MG EC tablet  Take 1 tablet (325 mg total) by mouth once daily.     folic acid 1 MG tablet  Commonly known as: FOLVITE  Take 1 tablet (1 mg total) by mouth once daily.     furosemide 20 MG tablet  Commonly known as: LASIX  Take 1 tablet (20 mg total) by mouth once daily.     losartan 25 MG tablet  Commonly known as: COZAAR  Take 1 tablet (25 mg total) by mouth once daily.     nicotine 21 mg/24 hr  Commonly known as: NICODERM CQ  Place 1 patch onto the skin once daily.     PAIN RELIEF (ACETAMINOPHEN) 500 MG tablet  Generic drug: acetaminophen  Take 1 tablet (500 mg total) by mouth every 6 (six) hours as needed for Pain.     pantoprazole 40 MG tablet  Commonly known as: PROTONIX  Take 1 tablet (40 mg total) by mouth once daily.     thiamine 100 MG tablet  Take 1 tablet (100 mg total) by mouth once daily.     TUMS ORAL  Take 2 tablets by mouth daily as needed (Heartburn).        STOP taking these medications    arformoteroL 15 mcg/2 mL  [place]  Commonly known as: BROVANA            Indwelling Lines/Drains at time of discharge:   Lines/Drains/Airways     None                 Time spent on the discharge of patient: 36 minutes         Viktoriya Waldron PA-C  Department of Hospital Medicine  [facility]

## 2023-02-01 ENCOUNTER — TELEPHONE (OUTPATIENT)
Dept: PRIMARY CARE CLINIC | Facility: CLINIC | Age: 63
End: 2023-02-01
Payer: MEDICARE

## 2023-02-01 ENCOUNTER — OFFICE VISIT (OUTPATIENT)
Dept: PRIMARY CARE CLINIC | Facility: CLINIC | Age: 63
End: 2023-02-01
Payer: MEDICARE

## 2023-02-01 VITALS
SYSTOLIC BLOOD PRESSURE: 125 MMHG | OXYGEN SATURATION: 89 % | HEART RATE: 95 BPM | HEIGHT: 66 IN | DIASTOLIC BLOOD PRESSURE: 74 MMHG | TEMPERATURE: 98 F | WEIGHT: 136.13 LBS | BODY MASS INDEX: 21.88 KG/M2

## 2023-02-01 DIAGNOSIS — I50.32 CHRONIC DIASTOLIC CONGESTIVE HEART FAILURE: ICD-10-CM

## 2023-02-01 DIAGNOSIS — F32.4 MAJOR DEPRESSION SINGLE EPISODE, IN PARTIAL REMISSION: Primary | ICD-10-CM

## 2023-02-01 DIAGNOSIS — Z72.0 NICOTINE ABUSE: ICD-10-CM

## 2023-02-01 DIAGNOSIS — F17.210 CIGARETTE NICOTINE DEPENDENCE WITHOUT COMPLICATION: ICD-10-CM

## 2023-02-01 DIAGNOSIS — Z72.0 TOBACCO ABUSE: ICD-10-CM

## 2023-02-01 DIAGNOSIS — R64 PULMONARY CACHEXIA DUE TO CHRONIC OBSTRUCTIVE PULMONARY DISEASE: ICD-10-CM

## 2023-02-01 DIAGNOSIS — J44.9 PULMONARY CACHEXIA DUE TO CHRONIC OBSTRUCTIVE PULMONARY DISEASE: ICD-10-CM

## 2023-02-01 PROCEDURE — 1111F DSCHRG MED/CURRENT MED MERGE: CPT | Mod: CPTII,S$GLB,, | Performed by: INTERNAL MEDICINE

## 2023-02-01 PROCEDURE — 1159F MED LIST DOCD IN RCRD: CPT | Mod: CPTII,S$GLB,, | Performed by: INTERNAL MEDICINE

## 2023-02-01 PROCEDURE — 1111F PR DISCHARGE MEDS RECONCILED W/ CURRENT OUTPATIENT MED LIST: ICD-10-PCS | Mod: CPTII,S$GLB,, | Performed by: INTERNAL MEDICINE

## 2023-02-01 PROCEDURE — 1159F PR MEDICATION LIST DOCUMENTED IN MEDICAL RECORD: ICD-10-PCS | Mod: CPTII,S$GLB,, | Performed by: INTERNAL MEDICINE

## 2023-02-01 PROCEDURE — 3008F PR BODY MASS INDEX (BMI) DOCUMENTED: ICD-10-PCS | Mod: CPTII,S$GLB,, | Performed by: INTERNAL MEDICINE

## 2023-02-01 PROCEDURE — 99213 PR OFFICE/OUTPT VISIT, EST, LEVL III, 20-29 MIN: ICD-10-PCS | Mod: S$GLB,,, | Performed by: INTERNAL MEDICINE

## 2023-02-01 PROCEDURE — 3074F SYST BP LT 130 MM HG: CPT | Mod: CPTII,S$GLB,, | Performed by: INTERNAL MEDICINE

## 2023-02-01 PROCEDURE — 99213 OFFICE O/P EST LOW 20 MIN: CPT | Mod: S$GLB,,, | Performed by: INTERNAL MEDICINE

## 2023-02-01 PROCEDURE — 3078F DIAST BP <80 MM HG: CPT | Mod: CPTII,S$GLB,, | Performed by: INTERNAL MEDICINE

## 2023-02-01 PROCEDURE — 4010F PR ACE/ARB THEARPY RXD/TAKEN: ICD-10-PCS | Mod: CPTII,S$GLB,, | Performed by: INTERNAL MEDICINE

## 2023-02-01 PROCEDURE — 3074F PR MOST RECENT SYSTOLIC BLOOD PRESSURE < 130 MM HG: ICD-10-PCS | Mod: CPTII,S$GLB,, | Performed by: INTERNAL MEDICINE

## 2023-02-01 PROCEDURE — 4010F ACE/ARB THERAPY RXD/TAKEN: CPT | Mod: CPTII,S$GLB,, | Performed by: INTERNAL MEDICINE

## 2023-02-01 PROCEDURE — 3078F PR MOST RECENT DIASTOLIC BLOOD PRESSURE < 80 MM HG: ICD-10-PCS | Mod: CPTII,S$GLB,, | Performed by: INTERNAL MEDICINE

## 2023-02-01 PROCEDURE — 3008F BODY MASS INDEX DOCD: CPT | Mod: CPTII,S$GLB,, | Performed by: INTERNAL MEDICINE

## 2023-02-01 RX ORDER — VARENICLINE TARTRATE 0.5 (11)-1
KIT ORAL
Qty: 53 EACH | Refills: 0 | Status: SHIPPED | OUTPATIENT
Start: 2023-02-01 | End: 2023-02-20 | Stop reason: SDUPTHER

## 2023-02-01 RX ORDER — ESCITALOPRAM OXALATE 10 MG/1
10 TABLET ORAL DAILY
Qty: 90 TABLET | Refills: 3 | Status: SHIPPED | OUTPATIENT
Start: 2023-02-01 | End: 2024-02-01

## 2023-02-01 NOTE — ASSESSMENT & PLAN NOTE
End-stage w/ 14 hospitalizations in the last year and ongoing tob use. On triple therapy, prn PAULA and nighttime NIV from chronic hypercap RF.     - Cont triple therapy and prn PAULA/ISIS  - Cont nighttime NIV  - Referral placed for pulm rehab, again. Discussed the importance of following through  - Strongly encouraged smoking cessation  - Start low dose pred given his recurrent exacerbations and ongoing wheezing on today's exam  - Cont oxygen at current prescription    Had a long discussion with the patient regarding the progression of COPD and what that looks like at the end of life. Offered referral to palliative care clinic which he accepted.

## 2023-02-01 NOTE — PROGRESS NOTES
Adherence re-packed using Dispill for 7 days:   (Compressed monthly)       Morning Pocket:   Diltiazam 90 mg (2 tablets)   Eliquis 5 mg   Escitalopram 10mg (added)  Furosemide 20 mg   Ferrous Sulfate 325mg   Pantoprazole 40 mg   Prednisone 5 mg - added to the updated packs     Evening Pocket:   Atorvastatin 40 mg   Eliquis 5 mg   Folic Acid 1mg   MVI 50+   Thiamine 100 mg      Repacked 10 days to add Escitalopram 10mg. Rep from [facility] will come to . TTN

## 2023-02-01 NOTE — ASSESSMENT & PLAN NOTE
Patient understands the importance of quitting. Offered referral to pulm rehab which was declined.

## 2023-02-01 NOTE — TELEPHONE ENCOUNTER
Referral/order written per Dr. Orona.  Documents faxed and emaied to [facility].   Reception of documents confirmed per Fredo Asher RN.  Stated will go out to assess patient today

## 2023-02-01 NOTE — PROGRESS NOTES
Adherence re-packed using Dispill for 10 days:   (Compressed monthly)      Morning Pocket:   Diltiazam 90 mg (2 tablets)   Eliquis 5 mg   Escitalopram 10mg (added)  Furosemide 20 mg   Ferrous Sulfate 325mg   Pantoprazole 40 mg     Evening Pocket:   Atorvastatin 40 mg   Eliquis 5 mg   Folic Acid 1mg   MVI 50+   Thiamine 100 mg     Repacked 10 days to add Escitalopram 10mg. Rep from [facility] will come to . TTN

## 2023-02-01 NOTE — PROGRESS NOTES
Primary Care Provider Appointment - [facility]  SHARED NOTE: Jasmyne Lewis (MD trainee), Dr Orona (Attending)    Subjective:      Patient ID: Baltazar Mccray is a 62 y.o. male with COPD (still smoking, trying to quit), HTN, HLD, low health literacy, and frequent hospitalizations      Chief Complaint: Hospital Discharge Follow up    Prior to this visit, patient's last encounter with PCP was 1/25/2023.    Pt reports recent hospitalization on 1/28/2023 with discharge on 1/30/2023 and pulmonary followup yesterday on 1/31/2023. Main complaint is shortness of breath and coughing today. He is here on 2L of O2 (with regulator). He brought all medications from hospital as well as his pill packs. In addition to his pill packs, he also brought in a bottle of escitalopram and lorazepam that were prescribed during discharge. He was also prescribed 10mg prednisone to be taken daily that his pulmonologist Rx yesterday but was never picked up.    We discussed the dangers of Lorazepam in depth and emphasized that he should only take these in emergencies when he is panicking and hyperventilating. Marked this pill bottle with red and re-emphasized the importance of avoiding taking this unless absolutely necessary.    Today: The escitalopram and prednisone were added to his pillpack. A new O2 tank was picked up from [facility] on [address] because his current tank became empty while he was here.  In addition, he is in the process of quitting smoking and wants to start taking Chantix to do this. The starter pack for this was also given today.      FU in 1wk to specifically check how he is doing with the Chantix and add this to his pill pack as he should be done with the titration up at this point      4Ms for Medical Decision-Making in Older Adults    Last Completed EAWV: None    MOBILITY:  Get Up and Go:  No flowsheet data found.  Activities of Daily Living:  No flowsheet data found.  Whisper Test:  No flowsheet data  found.  Disability Status:  Disability Status 10/3/2018   Are you deaf or do you have serious difficulty hearing? No   Are you blind or do you have serious difficulty seeing, even when wearing glasses? No   Because of a physical, mental, or emotional condition, do you have serious difficulty concentrating, remembering, or making decisions? No   Do you have serious difficulty walking or climbing stairs? No   Do you have difficulty dressing or bathing? No   Because of a physical, mental, or emotional condition, do you have difficulty doing errands alone such as visiting a doctor's office or shopping? No     Nutrition Screening:  No flowsheet data found. Screening Score: 0-7 Malnourished, 8-11 At Risk, 12-14 Normal    MENTATION:   Depression Patient Health Questionnaire:  Depression Patient Health Questionnaire 2/1/2023   Over the last two weeks how often have you been bothered by little interest or pleasure in doing things Not at all   Over the last two weeks how often have you been bothered by feeling down, depressed or hopeless Not at all   PHQ-2 Total Score 0   PHQ-4 Total Score -     Has Dementia Dx: No    Cognitive Function Screening:  No flowsheet data found.  Cognitive Function Screening Total - Less than 4 = Abnormal,  Greater than or equal to 4 = Normal    MEDICATIONS:  High Risk Medications:  Total Active Medications: 2  EScitalopram oxalate - 10 MG  LORazepam - 0.5 MG    WHAT MATTERS MOST:  Advance Care Planning   ACP Status:   Patient has had an ACP conversation  Living Will: No  Power of : No  LaPOST: No    What is most important right now is to focus on avoiding the hospital    Accordingly, we have decided that the best plan to meet the patient's goals includes continuing with treatment                   Social History     Socioeconomic History    Marital status: Single   Tobacco Use    Smoking status: Some Days     Packs/day: 0.25     Types: Cigarettes     Passive exposure: Past    Smokeless  tobacco: Never    Tobacco comments:     1-2 cigarettes per day     1/31 Patient quick smoking 1 month ago   Substance and Sexual Activity    Alcohol use: Yes     Alcohol/week: 2.0 standard drinks     Types: 2 Cans of beer per week     Comment: every night    Drug use: No    Sexual activity: Not Currently   Social History Narrative    Patient' nephew is currently living with him in his apartment. Patient's sister Viry ([phone]) helps manage patient's care.            6/3/21    Patient is no longer staying with family. Patient is currently staying at the [facility] and working on getting into their program for more supportive housing.      Social Determinants of Health     Financial Resource Strain: Low Risk     Difficulty of Paying Living Expenses: Not hard at all   Food Insecurity: Unknown    Worried About Running Out of Food in the Last Year: Patient refused    Ran Out of Food in the Last Year: Patient refused   Transportation Needs: No Transportation Needs    Lack of Transportation (Medical): No    Lack of Transportation (Non-Medical): No   Physical Activity: Unknown    Days of Exercise per Week: Patient refused    Minutes of Exercise per Session: Patient refused   Stress: Stress Concern Present    Feeling of Stress : Rather much   Social Connections: Socially Isolated    Frequency of Communication with Friends and Family: More than three times a week    Frequency of Social Gatherings with Friends and Family: Twice a week    Attends Gnosticist Services: Never    Active Member of Clubs or Organizations: No    Attends Club or Organization Meetings: Patient refused    Marital Status: Never    Housing Stability: Low Risk     Unable to Pay for Housing in the Last Year: No    Number of Places Lived in the Last Year: 2    Unstable Housing in the Last Year: No       Review of Systems   Constitutional:  Negative for activity change, appetite change, chills, diaphoresis, fatigue, fever and unexpected weight  "change.   Respiratory:  Positive for cough, shortness of breath, wheezing and stridor.      Objective:   /74 (BP Location: Right arm, Patient Position: Sitting, BP Method: Small (Automatic))   Pulse 95   Temp 97.6 °F (36.4 °C) (Oral)   Ht 5' 6" (1.676 m)   Wt 61.7 kg (136 lb 2.1 oz)   SpO2 (!) 89%   BMI 21.97 kg/m²     Physical Exam  Constitutional:       General: He is not in acute distress.     Comments: On 2L O2, some trouble breathing after coughing spells, initially irritated but feeling better with noticeable improved mood after starting duoneb and changing Oxygen tanks   HENT:      Head: Normocephalic and atraumatic.   Eyes:      Extraocular Movements: Extraocular movements intact.      Conjunctiva/sclera: Conjunctivae normal.      Pupils: Pupils are equal, round, and reactive to light.   Cardiovascular:      Pulses: Normal pulses.      Heart sounds: Normal heart sounds.   Pulmonary:      Effort: Respiratory distress present.      Breath sounds: Stridor present. No wheezing.      Comments: No wheezes on auscultation, some stridor after a coughing spell with difficulty breathing for a couple minutes after coughing  Abdominal:      General: Abdomen is flat.   Musculoskeletal:      Cervical back: Normal range of motion and neck supple.   Skin:     Capillary Refill: Capillary refill takes less than 2 seconds.   Neurological:      Mental Status: He is alert.   Psychiatric:         Behavior: Behavior normal.         Thought Content: Thought content normal.         Judgment: Judgment normal.      Comments: Initially irritable mood, but better mood after addressing breathing           Lab Results   Component Value Date    WBC 7.94 01/30/2023    HGB 10.9 (L) 01/30/2023    HCT 38.3 (L) 01/30/2023     01/30/2023    CHOL 260 (H) 04/19/2020    TRIG 145 04/19/2020    HDL 91 (H) 04/19/2020    ALT 9 (L) 01/28/2023    AST 15 01/28/2023     01/30/2023    K 4.3 01/30/2023    CL 95 01/30/2023    " CREATININE 0.9 01/30/2023    BUN 15 01/30/2023    CO2 32 (H) 01/30/2023    TSH 1.137 12/05/2022    INR 1.1 12/08/2022    HGBA1C 5.7 (H) 02/10/2022       Current Outpatient Medications on File Prior to Visit   Medication Sig Dispense Refill    acetaminophen (TYLENOL) 500 MG tablet Take 1 tablet (500 mg total) by mouth every 6 (six) hours as needed for Pain. 90 tablet 0    albuterol (PROVENTIL/VENTOLIN HFA) 90 mcg/actuation inhaler Inhale 1-2 puffs into the lungs every 6 (six) hours as needed for Wheezing. Rescue 8.5 g 2    albuterol-ipratropium (DUO-NEB) 2.5 mg-0.5 mg/3 mL nebulizer solution Use 1 vial (3 mLs) by nebulization every 4 (four) hours as needed for Wheezing or Shortness of Breath. Rescue 180 mL 2    apixaban (ELIQUIS) 5 mg Tab Take 1 tablet (5 mg total) by mouth 2 (two) times daily. 180 tablet 3    atorvastatin (LIPITOR) 40 MG tablet Take 1 tablet (40 mg total) by mouth once daily. 90 tablet 3    bismuth subsalicylate (PEPTO BISMOL) 262 mg/15 mL suspension Take 30 mLs by mouth daily as needed for Indigestion.      budesonide (PULMICORT) 0.5 mg/2 mL nebulizer solution Take 2 mLs (0.5 mg total) by nebulization 2 (two) times a day. Controller 1440 mL 3    calcium carbonate (TUMS ORAL) Take 2 tablets by mouth daily as needed (Heartburn).      diltiaZEM (CARDIZEM) 90 MG tablet Take 2 tablets (180 mg total) by mouth once daily. 180 tablet 3    ferrous sulfate 325 (65 FE) MG EC tablet Take 1 tablet (325 mg total) by mouth once daily. 30 tablet 1    folic acid (FOLVITE) 1 MG tablet Take 1 tablet (1 mg total) by mouth once daily. 30 tablet 2    furosemide (LASIX) 20 MG tablet Take 1 tablet (20 mg total) by mouth once daily. 90 tablet 3    inhalat.spacing dev,large mask (COMPACT SPACE CHAMBER-LRG MASK) Spcr Use as directed 1 each 0    LORazepam (ATIVAN) 0.5 MG tablet Take 1 tablet (0.5 mg total) by mouth every 6 (six) hours as needed (anxiety, air hunger). 15 tablet 0    losartan (COZAAR) 25 MG tablet Take 1 tablet  (25 mg total) by mouth once daily. 60 tablet 0    multivit-min-FA-lycopen-lutein (CERTAVITE SENIOR) 0.4 mg-300 mcg- 250 mcg Tab Take 1 tablet by mouth once daily. 30 tablet 2    nicotine (NICODERM CQ) 21 mg/24 hr Place 1 patch onto the skin once daily. 28 patch 11    pantoprazole (PROTONIX) 40 MG tablet Take 1 tablet (40 mg total) by mouth once daily. 30 tablet 11    predniSONE (DELTASONE) 10 MG tablet Take 1 tablet (10 mg total) by mouth once daily. 30 tablet 3    thiamine 100 MG tablet Take 1 tablet (100 mg total) by mouth once daily. 30 tablet 2    tiotropium bromide (SPIRIVA RESPIMAT) 2.5 mcg/actuation inhaler Inhale 2 puffs into the lungs Daily. Controller 4 g 0    [DISCONTINUED] EScitalopram oxalate (LEXAPRO) 10 MG tablet Take 1 tablet (10 mg total) by mouth once daily. 30 tablet 11     No current facility-administered medications on file prior to visit.         Assessment:   62 y.o. male with multiple co-morbid illnesses here to follow-up with PCP and continue work-up of chronic issues:    Plan:     Problem List Items Addressed This Visit          Psychiatric    Major depression single episode, in partial remission - Primary    Relevant Medications    EScitalopram oxalate (LEXAPRO) 10 MG tablet       Pulmonary    Pulmonary cachexia due to chronic obstructive pulmonary disease    Relevant Orders    Ambulatory referral/consult to Hospice       Cardiac/Vascular    Chronic diastolic congestive heart failure    Relevant Orders    Ambulatory referral/consult to Hospice       Other    Cigarette nicotine dependence without complication    Relevant Medications    varenicline (CHANTIX STARTING MONTH BOX) 0.5 mg (11)- 1 mg (42) tablet    Tobacco abuse    Relevant Medications    varenicline (CHANTIX STARTING MONTH BOX) 0.5 mg (11)- 1 mg (42) tablet       Health Maintenance         Date Due Completion Date    TETANUS VACCINE Never done ---    Colorectal Cancer Screening Never done ---    Hemoglobin A1c (Prediabetes)  02/10/2023 2/10/2022    Shingles Vaccine (2 of 2) 03/15/2023 1/18/2023    High Dose Statin 02/01/2024 2/1/2023    Lipid Panel 04/19/2025 4/19/2020    Pneumococcal Vaccines (Age 0-64) (3 - PPSV23 if available, else PCV20) 12/19/2027 12/19/2022            Future Appointments   Date Time Provider Department Center   2/7/2023  8:00 AM Susan Rae, NP [address]   2/9/2023  9:30 AM [facility] MED CLN [facility]   2/9/2023 10:00 AM [facility] SMOKE [facility]   2/13/2023  3:00 PM Raina Moore MD [facility] PAL MED [facility]   2/20/2023 10:00 AM Delphine Orona MD [facility] MED N [facility]   4/24/2023  1:30 PM Hung Malave MD [facility] SLEEP [facility]         Follow up in about 1 week (around 2/8/2023). Total clinical care time was 30 min, issues addressed include COPD, SOB, cachexia    Delphine Orona MD/MPH  [facility]  [phone] spectralink

## 2023-02-03 ENCOUNTER — TELEPHONE (OUTPATIENT)
Dept: PRIMARY CARE CLINIC | Facility: CLINIC | Age: 63
End: 2023-02-03

## 2023-02-03 DIAGNOSIS — R64 PULMONARY CACHEXIA DUE TO CHRONIC OBSTRUCTIVE PULMONARY DISEASE: Primary | ICD-10-CM

## 2023-02-03 DIAGNOSIS — J44.9 PULMONARY CACHEXIA DUE TO CHRONIC OBSTRUCTIVE PULMONARY DISEASE: Primary | ICD-10-CM

## 2023-02-03 RX ORDER — FORMOTEROL FUMARATE DIHYDRATE 20 UG/2ML
20 SOLUTION RESPIRATORY (INHALATION) 2 TIMES DAILY
Qty: 360 ML | Refills: 3 | Status: SHIPPED | OUTPATIENT
Start: 2023-02-03 | End: 2024-02-03

## 2023-02-03 RX ORDER — ARFORMOTEROL TARTRATE 15 UG/2ML
15 SOLUTION RESPIRATORY (INHALATION) 2 TIMES DAILY
Qty: 360 ML | Refills: 3 | Status: SHIPPED | OUTPATIENT
Start: 2023-02-03 | End: 2024-02-03

## 2023-02-06 DIAGNOSIS — J42 CHRONIC BRONCHITIS, UNSPECIFIED CHRONIC BRONCHITIS TYPE: ICD-10-CM

## 2023-02-06 DIAGNOSIS — J44.9 CHRONIC OBSTRUCTIVE PULMONARY DISEASE, UNSPECIFIED COPD TYPE: ICD-10-CM

## 2023-02-06 NOTE — TELEPHONE ENCOUNTER
----- Message from Radha Sahu sent at 2/6/2023  3:45 PM CST -----  Contact: [place]/[facility] [phone]  Stated that the patient has been seen 3 times and is non-compliant with his medication. If not given by home health he is not taking them. Meds are pill packed and patient still is not taking them. BP Friday was 120/70 today is 150/80.    Please call and advise.    Thank You

## 2023-02-07 ENCOUNTER — CARE AT HOME (OUTPATIENT)
Dept: HOME HEALTH SERVICES | Facility: CLINIC | Age: 63
End: 2023-02-07
Payer: MEDICARE

## 2023-02-07 DIAGNOSIS — R64 PULMONARY CACHEXIA DUE TO CHRONIC OBSTRUCTIVE PULMONARY DISEASE: ICD-10-CM

## 2023-02-07 DIAGNOSIS — J42 CHRONIC BRONCHITIS, UNSPECIFIED CHRONIC BRONCHITIS TYPE: Primary | Chronic | ICD-10-CM

## 2023-02-07 DIAGNOSIS — J44.9 PULMONARY CACHEXIA DUE TO CHRONIC OBSTRUCTIVE PULMONARY DISEASE: ICD-10-CM

## 2023-02-07 PROCEDURE — 99443 PR PHYSICIAN TELEPHONE EVALUATION 21-30 MIN: CPT | Mod: 95,S$GLB,, | Performed by: NURSE PRACTITIONER

## 2023-02-07 PROCEDURE — 99443 PR PHYSICIAN TELEPHONE EVALUATION 21-30 MIN: ICD-10-PCS | Mod: 95,S$GLB,, | Performed by: NURSE PRACTITIONER

## 2023-02-08 ENCOUNTER — OFFICE VISIT (OUTPATIENT)
Dept: PRIMARY CARE CLINIC | Facility: CLINIC | Age: 63
End: 2023-02-08
Payer: MEDICARE

## 2023-02-08 VITALS
DIASTOLIC BLOOD PRESSURE: 70 MMHG | HEIGHT: 66 IN | HEART RATE: 82 BPM | SYSTOLIC BLOOD PRESSURE: 138 MMHG | BODY MASS INDEX: 21.97 KG/M2 | OXYGEN SATURATION: 91 %

## 2023-02-08 DIAGNOSIS — H53.9 VISION CHANGES: Primary | ICD-10-CM

## 2023-02-08 PROCEDURE — 3075F PR MOST RECENT SYSTOLIC BLOOD PRESS GE 130-139MM HG: ICD-10-PCS | Mod: CPTII,S$GLB,, | Performed by: INTERNAL MEDICINE

## 2023-02-08 PROCEDURE — 3008F PR BODY MASS INDEX (BMI) DOCUMENTED: ICD-10-PCS | Mod: CPTII,S$GLB,, | Performed by: INTERNAL MEDICINE

## 2023-02-08 PROCEDURE — 3075F SYST BP GE 130 - 139MM HG: CPT | Mod: CPTII,S$GLB,, | Performed by: INTERNAL MEDICINE

## 2023-02-08 PROCEDURE — 99211 OFF/OP EST MAY X REQ PHY/QHP: CPT | Mod: S$GLB,,, | Performed by: INTERNAL MEDICINE

## 2023-02-08 PROCEDURE — 99211 PR OFFICE/OUTPT VISIT, EST, LEVL I: ICD-10-PCS | Mod: S$GLB,,, | Performed by: INTERNAL MEDICINE

## 2023-02-08 PROCEDURE — 3008F BODY MASS INDEX DOCD: CPT | Mod: CPTII,S$GLB,, | Performed by: INTERNAL MEDICINE

## 2023-02-08 PROCEDURE — 3078F PR MOST RECENT DIASTOLIC BLOOD PRESSURE < 80 MM HG: ICD-10-PCS | Mod: CPTII,S$GLB,, | Performed by: INTERNAL MEDICINE

## 2023-02-08 PROCEDURE — 3078F DIAST BP <80 MM HG: CPT | Mod: CPTII,S$GLB,, | Performed by: INTERNAL MEDICINE

## 2023-02-08 PROCEDURE — 4010F ACE/ARB THERAPY RXD/TAKEN: CPT | Mod: CPTII,S$GLB,, | Performed by: INTERNAL MEDICINE

## 2023-02-08 PROCEDURE — 4010F PR ACE/ARB THEARPY RXD/TAKEN: ICD-10-PCS | Mod: CPTII,S$GLB,, | Performed by: INTERNAL MEDICINE

## 2023-02-08 RX ORDER — FERROUS SULFATE 325(65) MG
325 TABLET, DELAYED RELEASE (ENTERIC COATED) ORAL DAILY
Qty: 30 TABLET | Refills: 1 | Status: SHIPPED | OUTPATIENT
Start: 2023-02-08 | End: 2023-03-20 | Stop reason: SDUPTHER

## 2023-02-08 RX ORDER — ALBUTEROL SULFATE 90 UG/1
1-2 AEROSOL, METERED RESPIRATORY (INHALATION) EVERY 6 HOURS PRN
Qty: 8.5 G | Refills: 2 | Status: SHIPPED | OUTPATIENT
Start: 2023-02-08 | End: 2023-04-18

## 2023-02-08 NOTE — PROGRESS NOTES
"Primary Care Provider Appointment - [facility]  SHARED NOTE: Jasmyne Lewis (MD Trainee), Dr Orona (Attending)    Subjective:      Patient ID: Baltazar Mccray is a 62 y.o. male with COPD (still smoking, trying to quit), HTN, HLD, low health literacy, and frequent hospitalizations      Chief Complaint: Follow up  Prior to this visit, patient's last encounter with PCP was 2/1/2023.    Pt reports doing well today. He reports being out of medication and needs new pill packs. He also reports being out of his albuterol.    Last time he was in, was given Chantrix outside of his pill packs. He has not been taking them because he forgot what they were for.     Advanced Care Planning and Hospice  - We filled out ACP documents today, he wants his power of  and decision maker to be his sister Gladis Mccray. Papers were signed and put into the chart today. Pt does not want invasive life-sustaining measures.   - We also talked about hospice and he agrees to have hospice seeing him, Fredo Asher with [facility] [phone] was called today. She said that last week even with Gladis on the phone, he was resistant to the idea of hospice and giving up returning to the ED whenever he is struggling. Talked to Baltazar today about hospice and he seems amenable to talking with Fredo again, reached out to Fredo to let her know and she will reach out to Baltazar again      Dental   - Multiple rotten teeth, has a scheduled dentist appt today with Hung Yan DDS today at 2:15pm. But pt wants to cancel the appointment. He was informed multiple times the risks of postponing this as the next available appointment is on April 20th and there are risks involved of having multiple rotten teeth including infective endocarditis. The pt voiced understanding and is adamant about not going to the dentist appointment and will wait until April 20th and wants "all my teeth out"  - Pt is in eliquis and must pause it prior to any teeth " extraction. Per UpToDate, this is a low/moderate risk procedure and thus needs to omit his eliquis a day before surgery and the day of surgery:   - Pt is now scheduled for a new appointment with Dr. Hung Dunn on April 20th at 1pm, he needs to stop his eliquis 2 day before this appointment  and can resume the day. Stop eliquis April 18th- April 21st, Restart April 22nd.    HTN  - Currently taking: furosemide, diltiazam  - How often taking BP at home: never  - Today, BP is: 138/70    HLD  - Currently taking: atorvastatin, eliquis  - Last lipid panel: 4/19/2020   - Total Cholesterol: 260 (elevated)   - Triglycerides: 145 (WNL)   - HDL: 91 (elevated)   - LDL: 140 (WNL)    Asthma/COPD  - Currently taking: albuterol, not taking his brovana, bipap  - Smoking status: trying to quit, has not been taking his chantrix  - On 3L of O2 at home and today  - Wheezing today on auscultation (no visible SOB or respiratory distress), but refuses nebulizer today    Mental Health  - Currently taking: escitalopram, is supposed to be taking chantrix  - Social support system consists of his brother, lives at home with his cat, Arturoni      Specialty notes: Pulmonologist  - Last seen on 1/30/2023, by Dr. Carlo Pascal, FU with them has not been scheduled and is not in their last note  - Current treatment plan for this problem is daily prednisone 5mg at this point, Dr. Pascal said to continue this indefinitely with no follow up plan at this time    Specialty notes: Optometry  - pt reports needing new glasses and struggling to see  - ambulatory referral placed and we are scheduling, will tell the pt what the appt is next time he comes in      Medications: Does have pill packs     Morning Pocket:   Diltiazam 90 mg (2 tablets)   Eliquis 5 mg   Escitalopram 10mg   Furosemide 20 mg   Ferrous Sulfate 325mg   Pantoprazole 40 mg   Prednisone 5 mg      Evening Pocket:   Atorvastatin 40 mg   Eliquis 5 mg   Folic Acid 1mg   MVI 50+   Thiamine  100 mg      FU in 1 week to specifically check how hospice is going, talk to him about not going to the ED frequently, and hopefully he brought his chantrix in so we can pill pack those for him as well. Hopefully optometry appt will be scheduled for him and we can tell him when this is when he comes in next      4Ms for Medical Decision-Making in Older Adults    Last Completed EAWV: None    MOBILITY:  Get Up and Go:  No flowsheet data found.  Activities of Daily Living:  No flowsheet data found.  Whisper Test:  No flowsheet data found.  Disability Status:  Disability Status 10/3/2018   Are you deaf or do you have serious difficulty hearing? No   Are you blind or do you have serious difficulty seeing, even when wearing glasses? No   Because of a physical, mental, or emotional condition, do you have serious difficulty concentrating, remembering, or making decisions? No   Do you have serious difficulty walking or climbing stairs? No   Do you have difficulty dressing or bathing? No   Because of a physical, mental, or emotional condition, do you have difficulty doing errands alone such as visiting a doctor's office or shopping? No     Nutrition Screening:  No flowsheet data found. Screening Score: 0-7 Malnourished, 8-11 At Risk, 12-14 Normal    MENTATION:   Depression Patient Health Questionnaire:  Depression Patient Health Questionnaire 2/1/2023   Over the last two weeks how often have you been bothered by little interest or pleasure in doing things Not at all   Over the last two weeks how often have you been bothered by feeling down, depressed or hopeless Not at all   PHQ-2 Total Score 0   PHQ-4 Total Score -     Has Dementia Dx: No    Cognitive Function Screening:  No flowsheet data found.  Cognitive Function Screening Total - Less than 4 = Abnormal,  Greater than or equal to 4 = Normal    MEDICATIONS:  High Risk Medications:  Total Active Medications: 1  EScitalopram oxalate - 10 MG    WHAT MATTERS MOST:  Advance  Care Planning   ACP Status:   Patient has had an ACP conversation  Living Will: Yes  Power of : Yes  [place]: No    What is most important right now is to focus on avoiding the hospital    Accordingly, we have decided that the best plan to meet the patient's goals includes continuing with treatment                   Social History     Socioeconomic History    Marital status: Single   Tobacco Use    Smoking status: Some Days     Packs/day: 0.25     Types: Cigarettes     Passive exposure: Past    Smokeless tobacco: Never    Tobacco comments:     1-2 cigarettes per day     1/31 Patient quick smoking 1 month ago   Substance and Sexual Activity    Alcohol use: Yes     Alcohol/week: 2.0 standard drinks     Types: 2 Cans of beer per week     Comment: every night    Drug use: No    Sexual activity: Not Currently   Social History Narrative    Patient' nephew is currently living with him in his apartment. Patient's sister Viry ([phone]) helps manage patient's care.            6/3/21    Patient is no longer staying with family. Patient is currently staying at the [facility] and working on getting into their program for more supportive housing.      Social Determinants of Health     Financial Resource Strain: Low Risk     Difficulty of Paying Living Expenses: Not hard at all   Food Insecurity: Unknown    Worried About Running Out of Food in the Last Year: Patient refused    Ran Out of Food in the Last Year: Patient refused   Transportation Needs: No Transportation Needs    Lack of Transportation (Medical): No    Lack of Transportation (Non-Medical): No   Physical Activity: Unknown    Days of Exercise per Week: Patient refused    Minutes of Exercise per Session: Patient refused   Stress: Stress Concern Present    Feeling of Stress : Rather much   Social Connections: Socially Isolated    Frequency of Communication with Friends and Family: More than three times a week    Frequency of Social Gatherings with Friends  "and Family: Twice a week    Attends [employer] Services: Never    Active Member of Clubs or Organizations: No    Attends Club or Organization Meetings: Patient refused    Marital Status: Never    Housing Stability: Low Risk     Unable to Pay for Housing in the Last Year: No    Number of Places Lived in the Last Year: 2    Unstable Housing in the Last Year: No       Review of Systems   Constitutional: Negative.    HENT:  Positive for dental problem. Negative for congestion, drooling, ear discharge and ear pain.    Eyes:         Blurry vision   Respiratory:  Positive for choking and wheezing. Negative for apnea, cough, chest tightness, shortness of breath and stridor.    Cardiovascular: Negative.    Gastrointestinal: Negative.    Endocrine: Negative.    Genitourinary: Negative.    Musculoskeletal: Negative.      Objective:   /70 (BP Location: Left arm, Patient Position: Sitting, BP Method: Medium (Manual))   Pulse 82   Ht 5' 6" (1.676 m)   SpO2 (!) 91%   BMI 21.97 kg/m²     Physical Exam  Constitutional:       General: He is not in acute distress.     Appearance: Normal appearance. He is ill-appearing. He is not toxic-appearing or diaphoretic.      Comments: On 3L oxygen  cachectic   HENT:      Mouth/Throat:      Comments: Dental cavities  Eyes:      Extraocular Movements: Extraocular movements intact.      Conjunctiva/sclera: Conjunctivae normal.      Pupils: Pupils are equal, round, and reactive to light.   Cardiovascular:      Rate and Rhythm: Normal rate and regular rhythm.      Pulses: Normal pulses.      Heart sounds: Normal heart sounds.   Pulmonary:      Effort: Pulmonary effort is normal. No respiratory distress.      Breath sounds: No stridor. Wheezing present. No rales.   Chest:      Chest wall: No tenderness.   Abdominal:      General: Abdomen is flat. Bowel sounds are normal.      Palpations: Abdomen is soft.   Musculoskeletal:      Cervical back: Normal range of motion.   Skin:     " Capillary Refill: Capillary refill takes less than 2 seconds.      Findings: Bruising present.   Neurological:      Mental Status: He is alert.   Psychiatric:      Comments: Irritated when discussed dental appointment.            Lab Results   Component Value Date    WBC 7.94 01/30/2023    HGB 10.9 (L) 01/30/2023    HCT 38.3 (L) 01/30/2023     01/30/2023    CHOL 260 (H) 04/19/2020    TRIG 145 04/19/2020    HDL 91 (H) 04/19/2020    ALT 9 (L) 01/28/2023    AST 15 01/28/2023     01/30/2023    K 4.3 01/30/2023    CL 95 01/30/2023    CREATININE 0.9 01/30/2023    BUN 15 01/30/2023    CO2 32 (H) 01/30/2023    TSH 1.137 12/05/2022    INR 1.1 12/08/2022    HGBA1C 5.7 (H) 02/10/2022       Current Outpatient Medications on File Prior to Visit   Medication Sig Dispense Refill    acetaminophen (TYLENOL) 500 MG tablet Take 1 tablet (500 mg total) by mouth every 6 (six) hours as needed for Pain. 90 tablet 0    albuterol (PROVENTIL/VENTOLIN HFA) 90 mcg/actuation inhaler Inhale 1-2 puffs into the lungs every 6 (six) hours as needed for Wheezing. Rescue 8.5 g 2    albuterol-ipratropium (DUO-NEB) 2.5 mg-0.5 mg/3 mL nebulizer solution Use 1 vial (3 mLs) by nebulization every 4 (four) hours as needed for Wheezing or Shortness of Breath. Rescue 180 mL 2    apixaban (ELIQUIS) 5 mg Tab Take 1 tablet (5 mg total) by mouth 2 (two) times daily. 180 tablet 3    arformoteroL (BROVANA) 15 mcg/2 mL Nebu Take 2 mLs (15 mcg total) by nebulization 2 (two) times a day. Controller 360 mL 3    atorvastatin (LIPITOR) 40 MG tablet Take 1 tablet (40 mg total) by mouth once daily. 90 tablet 3    bismuth subsalicylate (PEPTO BISMOL) 262 mg/15 mL suspension Take 30 mLs by mouth daily as needed for Indigestion.      budesonide (PULMICORT) 0.5 mg/2 mL nebulizer solution Take 2 mLs (0.5 mg total) by nebulization 2 (two) times a day. Controller 1440 mL 3    calcium carbonate (TUMS ORAL) Take 2 tablets by mouth daily as needed (Heartburn).       diltiaZEM (CARDIZEM) 90 MG tablet Take 2 tablets (180 mg total) by mouth once daily. 180 tablet 3    EScitalopram oxalate (LEXAPRO) 10 MG tablet Take 1 tablet (10 mg total) by mouth once daily. 90 tablet 3    ferrous sulfate 325 (65 FE) MG EC tablet Take 1 tablet (325 mg total) by mouth once daily. 30 tablet 1    folic acid (FOLVITE) 1 MG tablet Take 1 tablet (1 mg total) by mouth once daily. 30 tablet 2    formoterol (PERFOROMIST) 20 mcg/2 mL Nebu Take 2 mLs (20 mcg total) by nebulization 2 (two) times a day. Controller 360 mL 3    furosemide (LASIX) 20 MG tablet Take 1 tablet (20 mg total) by mouth once daily. 90 tablet 3    inhalat.spacing dev,large mask (COMPACT SPACE CHAMBER-LRG MASK) Spcr Use as directed 1 each 0    LORazepam (ATIVAN) 0.5 MG tablet Take 1 tablet (0.5 mg total) by mouth every 6 (six) hours as needed (anxiety, air hunger). 15 tablet 0    losartan (COZAAR) 25 MG tablet Take 1 tablet (25 mg total) by mouth once daily. 60 tablet 0    multivit-min-FA-lycopen-lutein (CERTAVITE SENIOR) 0.4 mg-300 mcg- 250 mcg Tab Take 1 tablet by mouth once daily. 30 tablet 2    nicotine (NICODERM CQ) 21 mg/24 hr Place 1 patch onto the skin once daily. 28 patch 11    pantoprazole (PROTONIX) 40 MG tablet Take 1 tablet (40 mg total) by mouth once daily. 30 tablet 11    predniSONE (DELTASONE) 10 MG tablet Take 1 tablet (10 mg total) by mouth once daily. 30 tablet 3    thiamine 100 MG tablet Take 1 tablet (100 mg total) by mouth once daily. 30 tablet 2    tiotropium bromide (SPIRIVA RESPIMAT) 2.5 mcg/actuation inhaler Inhale 2 puffs into the lungs Daily. Controller 4 g 0    varenicline (CHANTIX STARTING MONTH BOX) 0.5 mg (11)- 1 mg (42) tablet Take one 0.5mg tab by mouth once daily X3 days,then increase to one 0.5mg tab twice daily X4 days,then increase to one 1mg tab twice daily 53 each 0     No current facility-administered medications on file prior to visit.         Assessment:   62 y.o. male with multiple co-morbid  illnesses here to follow-up with PCP and continue work-up of chronic issues:    Plan:     Problem List Items Addressed This Visit    None  Visit Diagnoses       Vision changes    -  Primary    Relevant Orders    Ambulatory referral/consult to Optometry            Health Maintenance         Date Due Completion Date    TETANUS VACCINE Never done ---    Colorectal Cancer Screening Never done ---    Hemoglobin A1c (Prediabetes) 02/10/2023 2/10/2022    Shingles Vaccine (2 of 2) 03/15/2023 1/18/2023    High Dose Statin 02/01/2024 2/1/2023    Lipid Panel 04/19/2025 4/19/2020    Pneumococcal Vaccines (Age 0-64) (3 - PPSV23 if available, else PCV20) 12/19/2027 12/19/2022            Future Appointments   Date Time Provider Department Center   2/13/2023  9:00 AM Delphine Orona MD [facility] MED CLN [address] PCW   2/13/2023  3:00 PM Raina Moore MD [facility] PAL MED [address]   2/20/2023 10:00 AM Delphine Orona MD [facility] MED CLN [address] PCW   4/17/2023  9[facility] [address] [facility]   4/24/2023  1:30 PM Hung Malave MD [facility] SLEEP [facility]   6/6/2023  8:00 AM Mary Jo Rueda, [place] [facility] OPTOMTY [address]         Follow up in about 1 week (around 2/15/2023). Total clinical care time was 20 min, issues addressed include COPD    Jasmyne Lewis    Delphine Orona MD/MPH  [facility]  [phone] [facility]

## 2023-02-08 NOTE — PLAN OF CARE
01/09/23 1516   Discharge Plan   Discharge Plan A Home with family     Await medical clearance   Obtain xray today to rule out pneumonia    Schedule appt with [facility] for women's [facility]    Typically, symptoms will get worse 3-5 days after the start of symptoms then will slowly better.  Symptoms may last up to 10-14 days.      Augmentin 875/125mg 1 tab twice daily for 10 days    Try tessalon pearles 100mg three times a day    Please get plenty of rest, push fluids, chicken noodle soup, hot tea with lemon, cough drops/throat lozenges, and gargle with salt water with sore throat.  Apply warm moist compress to face for relief for 5-10 minutes, four times a day    May alternate use of Tylenol and Ibuprofen as needed for fever/body aches; follow bottle label instructions for dosage and frequency of use.    You may also use mucinex 600mg, extended release, every 12 hours, over-the-counter to help with the cough and phlegm. Also recommended use of saline nasal mist to help facilitate drainage. May also use humidifier.    You may also use over-the-counter fluticasone propionate 50mcg/spray, 1-2 sprays each nostril once daily to help inflammation, nasal congestion, and post nasal drip.    You may use loratidine (Claritin) or Zyrtec 10mg once daily as needed for sneezing, itchy watery eyes.    Have fasting lipid panel, cbc, cmp, vitamin D,  Tsh reflex done prior to the appointment.  Do not eat any food or drink anything other than water 10 hours prior to blood work.  I do recommend drinking plenty of water before you get blood work.

## 2023-02-08 NOTE — PROGRESS NOTES
Adherence packed using Dispill for 14 days:   (Compressed monthly)       Morning Pocket:   Diltiazam 90 mg (2 tablets)   Eliquis 5 mg   Escitalopram 10mg  Furosemide 20 mg   Ferrous Sulfate 325mg   Pantoprazole 40 mg   Prednisone 10 mg     Evening Pocket:   Atorvastatin 40 mg   Eliquis 5 mg   Folic Acid 1mg   MVI 50+   Thiamine 100 mg       *Packs picked up by [facility] representative.

## 2023-02-09 ENCOUNTER — TELEPHONE (OUTPATIENT)
Dept: SMOKING CESSATION | Facility: CLINIC | Age: 63
End: 2023-02-09
Payer: MEDICARE

## 2023-02-09 NOTE — TELEPHONE ENCOUNTER
"[facility]- called patient for scheduled clinic appointment this morning. Spoke to patient and he stated that he was not rosa to make it this morning due to nurse visit at his home. Asked if he would like to reschedule appointment and he stated " that he not to interested in that right now". Encouraged patient to call us when he was ready to reschedule.   Remedios Hsieh, [facility]  [phone] or [phone].    "

## 2023-02-13 ENCOUNTER — TELEPHONE (OUTPATIENT)
Dept: PRIMARY CARE CLINIC | Facility: CLINIC | Age: 63
End: 2023-02-13
Payer: MEDICARE

## 2023-02-13 NOTE — TELEPHONE ENCOUNTER
Can you please reschedule patient for palliative appt, he is unable to attend appt for today, thank you.     Didi

## 2023-02-20 ENCOUNTER — OFFICE VISIT (OUTPATIENT)
Dept: PRIMARY CARE CLINIC | Facility: CLINIC | Age: 63
End: 2023-02-20
Payer: MEDICARE

## 2023-02-20 VITALS
TEMPERATURE: 99 F | HEIGHT: 66 IN | HEART RATE: 86 BPM | WEIGHT: 132.19 LBS | BODY MASS INDEX: 21.24 KG/M2 | DIASTOLIC BLOOD PRESSURE: 72 MMHG | OXYGEN SATURATION: 92 % | SYSTOLIC BLOOD PRESSURE: 122 MMHG

## 2023-02-20 DIAGNOSIS — Z72.0 TOBACCO ABUSE: Primary | ICD-10-CM

## 2023-02-20 DIAGNOSIS — F17.210 CIGARETTE NICOTINE DEPENDENCE WITHOUT COMPLICATION: ICD-10-CM

## 2023-02-20 PROCEDURE — 3078F DIAST BP <80 MM HG: CPT | Mod: CPTII,S$GLB,, | Performed by: INTERNAL MEDICINE

## 2023-02-20 PROCEDURE — 99212 PR OFFICE/OUTPT VISIT, EST, LEVL II, 10-19 MIN: ICD-10-PCS | Mod: S$GLB,,, | Performed by: INTERNAL MEDICINE

## 2023-02-20 PROCEDURE — 99212 OFFICE O/P EST SF 10 MIN: CPT | Mod: S$GLB,,, | Performed by: INTERNAL MEDICINE

## 2023-02-20 PROCEDURE — 1159F MED LIST DOCD IN RCRD: CPT | Mod: CPTII,S$GLB,, | Performed by: INTERNAL MEDICINE

## 2023-02-20 PROCEDURE — 3078F PR MOST RECENT DIASTOLIC BLOOD PRESSURE < 80 MM HG: ICD-10-PCS | Mod: CPTII,S$GLB,, | Performed by: INTERNAL MEDICINE

## 2023-02-20 PROCEDURE — 3074F PR MOST RECENT SYSTOLIC BLOOD PRESSURE < 130 MM HG: ICD-10-PCS | Mod: CPTII,S$GLB,, | Performed by: INTERNAL MEDICINE

## 2023-02-20 PROCEDURE — 1159F PR MEDICATION LIST DOCUMENTED IN MEDICAL RECORD: ICD-10-PCS | Mod: CPTII,S$GLB,, | Performed by: INTERNAL MEDICINE

## 2023-02-20 PROCEDURE — 3074F SYST BP LT 130 MM HG: CPT | Mod: CPTII,S$GLB,, | Performed by: INTERNAL MEDICINE

## 2023-02-20 PROCEDURE — 3008F PR BODY MASS INDEX (BMI) DOCUMENTED: ICD-10-PCS | Mod: CPTII,S$GLB,, | Performed by: INTERNAL MEDICINE

## 2023-02-20 PROCEDURE — 4010F PR ACE/ARB THEARPY RXD/TAKEN: ICD-10-PCS | Mod: CPTII,S$GLB,, | Performed by: INTERNAL MEDICINE

## 2023-02-20 PROCEDURE — 3008F BODY MASS INDEX DOCD: CPT | Mod: CPTII,S$GLB,, | Performed by: INTERNAL MEDICINE

## 2023-02-20 PROCEDURE — 4010F ACE/ARB THERAPY RXD/TAKEN: CPT | Mod: CPTII,S$GLB,, | Performed by: INTERNAL MEDICINE

## 2023-02-20 RX ORDER — VARENICLINE TARTRATE 0.5 (11)-1
KIT ORAL
Qty: 53 EACH | Refills: 0 | Status: SHIPPED | OUTPATIENT
Start: 2023-02-20 | End: 2023-03-21 | Stop reason: SDUPTHER

## 2023-02-20 NOTE — ASSESSMENT & PLAN NOTE
Patient has end-stage COPD, O2 dependent, on chronic steroids. Continues to smoke but has cut back  · Start chantix in pill packs

## 2023-02-20 NOTE — PROGRESS NOTES
Adherence packed using Dispill for 7 days:   (Compressed monthly)       Morning Pocket:   Diltiazam 90 mg (2 tablets)   Eliquis 5 mg   Escitalopram 10mg  Furosemide 20 mg   Ferrous Sulfate 325mg   Pantoprazole 40 mg   Prednisone 10 mg  Varenicline 0.5 mg (once daily for 3 days and then twice daily for 4 more days)     Evening Pocket:   Atorvastatin 40 mg   Eliquis 5 mg   Folic Acid 1mg   MVI 50+   Thiamine 100 mg   Varenicline 0.5 mg (once daily for 3 days and then twice daily for 4 more days)

## 2023-02-20 NOTE — PROGRESS NOTES
Primary Care Provider Appointment - [facility]  SHARED NOTE: Leatha Hall (MS4), Dr Orona (Attending)    Subjective:      Patient ID: Baltazar Mccray is a 62 y.o. male with COPD, Afib, HTN, HLD, low health literacy, and frequent hospitalizations    Chief Complaint: Follow-up and COPD    Prior to this visit, patient's last encounter with PCP was 2/8/2023.    Patient had some shortness of breath this morning, took albuterol this morning and it helped. On physical exam he has some expiratory wheezes. He does not want duonebs in clinic this morning. No issues with pill packs, finds it much easier to take his medications. He would like new pill packs. Has not been taking his Chantix, was not sure what it was for. He does not have the Chantix with him.  Reports using bipap everynight, no problems.     The patient has issues with breathing all the time, has to slow down his activities significantly. He reports exertional dyspnea. He has good response to albuterol, and reports that he has no problem taking his other inhalers. The patient  Reports some blurry vision, would like an earlier optometry appointment.     Patient reports the stream from his albuterol is not strong. The inhaler was cleaned under warm water and now works like normal. The patient was counseled on how to clean the inhaler when this issue happens in the future.     HTN  - Currently taking: Furosemide, Diltiazam  - How often taking BP at home: every morning, average is: 150/70 to 130/60. Takes the BP 30 minutes after medication  - Today, BP is: 122/72    HLD  - Currently taking: Atorvastatin  - The 10-year ASCVD risk score (Wily EVANS, et al., 2019) is: 18.5%    Values used to calculate the score:      Age: 62 years      Sex: Male      Is Non- : Yes      Diabetic: No      Tobacco smoker: Yes      Systolic Blood Pressure: 122 mmHg      Is BP treated: Yes      HDL Cholesterol: 91 mg/dL      Total Cholesterol: 260  mg/dL    Asthma/COPD  - Currently taking: Prednisone 5 mg, albuterol, bipap, brovana   - Smoking status: Every now and then. Counseled about Chantix and smoking cessation  - On 3L of O2 at home  - Has concentrator at home  - Reports some exertional dyspnea       Mental Health  - Currently taking: Escitalopram  - is not taking Chantix (could not understand the dose pack that was prescribed previously)  - Social support system consists of brother, lives in an apartment with his cat Menay  - Hobbies include playing pool if his oxygen lets him  - He cooks his own food, grits, eggs, lemon       Care Gaps:  - Tetanus Vaccine  - Colorectal Cancer Screening    Medications:    Currently on Pill packs   Morning Pocket:   Diltiazam 90 mg (2 tablets)   Eliquis 5 mg   Escitalopram 10mg  Furosemide 20 mg   Ferrous Sulfate 325mg   Pantoprazole 40 mg   Prednisone 10 mg  ADD Chantix 0.5 mg added for first 3 days  ADD Chantix 1 mg added for 4 days after that       Evening Pocket:   Atorvastatin 40 mg   Eliquis 5 mg   Folic Acid 1mg   MVI 50+   Thiamine 100 mg     ** only packed for 7 days and dispensed today, will start on 2/22, pockets dated by this office and delivered  ** will continue current pill packs without chantix for the next 2 days, pockets dated by this office    4Ms for Medical Decision-Making in Older Adults    Last Completed EAWV: None    MOBILITY:  Get Up and Go:  No flowsheet data found.  Activities of Daily Living:  No flowsheet data found.  Whisper Test:  No flowsheet data found.  Disability Status:  Disability Status 10/3/2018   Are you deaf or do you have serious difficulty hearing? No   Are you blind or do you have serious difficulty seeing, even when wearing glasses? No   Because of a physical, mental, or emotional condition, do you have serious difficulty concentrating, remembering, or making decisions? No   Do you have serious difficulty walking or climbing stairs? No   Do you have difficulty dressing or  bathing? No   Because of a physical, mental, or emotional condition, do you have difficulty doing errands alone such as visiting a doctor's office or shopping? No     Nutrition Screening:  No flowsheet data found. Screening Score: 0-7 Malnourished, 8-11 At Risk, 12-14 Normal    MENTATION:   Depression Patient Health Questionnaire:  Depression Patient Health Questionnaire 2/20/2023   Over the last two weeks how often have you been bothered by little interest or pleasure in doing things Not at all   Over the last two weeks how often have you been bothered by feeling down, depressed or hopeless Not at all   PHQ-2 Total Score 0   PHQ-4 Total Score -     Has Dementia Dx: No    Cognitive Function Screening:  No flowsheet data found.  Cognitive Function Screening Total - Less than 4 = Abnormal,  Greater than or equal to 4 = Normal    MEDICATIONS:  High Risk Medications:  Total Active Medications: 1  EScitalopram oxalate - 10 MG    WHAT MATTERS MOST:  Advance Care Planning   ACP Status:   Patient has had an ACP conversation  Living Will: Yes  Power of : Yes  LaPOST: No    What is most important right now is to focus on avoiding the hospital    Accordingly, we have decided that the best plan to meet the patient's goals includes continuing with treatment                   Social History     Socioeconomic History    Marital status: Single   Tobacco Use    Smoking status: Some Days     Packs/day: 0.25     Types: Cigarettes     Passive exposure: Past    Smokeless tobacco: Never    Tobacco comments:     1-2 cigarettes per day     1/31 Patient quick smoking 1 month ago   Substance and Sexual Activity    Alcohol use: Yes     Alcohol/week: 2.0 standard drinks     Types: 2 Cans of beer per week     Comment: every night    Drug use: No    Sexual activity: Not Currently   Social History Narrative    Patient' nephew is currently living with him in his apartment. Patient's sister Viry ([phone]) helps manage patient's  care.            6/3/21    Patient is no longer staying with family. Patient is currently staying at the [facility] and working on getting into their program for more supportive housing.      Social Determinants of Health     Financial Resource Strain: Low Risk     Difficulty of Paying Living Expenses: Not hard at all   Food Insecurity: Unknown    Worried About Running Out of Food in the Last Year: Patient refused    Ran Out of Food in the Last Year: Patient refused   Transportation Needs: No Transportation Needs    Lack of Transportation (Medical): No    Lack of Transportation (Non-Medical): No   Physical Activity: Unknown    Days of Exercise per Week: Patient refused    Minutes of Exercise per Session: Patient refused   Stress: Stress Concern Present    Feeling of Stress : Rather much   Social Connections: Socially Isolated    Frequency of Communication with Friends and Family: More than three times a week    Frequency of Social Gatherings with Friends and Family: Twice a week    Attends Voodoo Services: Never    Active Member of Clubs or Organizations: No    Attends Club or Organization Meetings: Patient refused    Marital Status: Never    Housing Stability: Low Risk     Unable to Pay for Housing in the Last Year: No    Number of Places Lived in the Last Year: 2    Unstable Housing in the Last Year: No       Review of Systems   Constitutional:  Positive for activity change, appetite change and unexpected weight change. Negative for fatigue and fever.        Activity change due to breathing status   HENT:  Positive for dental problem. Negative for congestion, ear pain, facial swelling, nosebleeds, rhinorrhea, sneezing, sore throat, trouble swallowing and voice change.    Eyes:  Positive for itching. Negative for discharge.        Needs new glasses    Respiratory:  Positive for chest tightness, shortness of breath and wheezing. Negative for cough.         Cannot catch his breath   Cardiovascular:   "Positive for chest pain and palpitations. Negative for leg swelling.   Gastrointestinal:  Negative for abdominal distention, abdominal pain, blood in stool, constipation, diarrhea, nausea and vomiting.   Genitourinary:  Positive for frequency and hematuria. Negative for difficulty urinating, dysuria and urgency.   Musculoskeletal:  Negative for arthralgias, back pain, joint swelling, neck pain and neck stiffness.   Skin:  Negative for color change and rash.   Neurological:  Negative for dizziness, seizures, syncope, weakness, light-headedness and headaches.   Psychiatric/Behavioral:  Negative for sleep disturbance.      Objective:   /72 (BP Location: Left arm, Patient Position: Sitting, BP Method: Medium (Manual))   Pulse 86   Temp 98.6 °F (37 °C) (Oral)   Ht 5' 6" (1.676 m)   Wt 59.9 kg (132 lb 2.7 oz)   SpO2 (!) 92%   BMI 21.33 kg/m²     Physical Exam  Constitutional:       General: He is not in acute distress.     Appearance: Normal appearance.      Comments: On oxygen    Cardiovascular:      Rate and Rhythm: Normal rate. Rhythm irregular.      Heart sounds: Normal heart sounds.   Pulmonary:      Effort: Pulmonary effort is normal.      Breath sounds: Wheezing present.      Comments: Some expiratory wheezes heard.   Musculoskeletal:         General: No swelling.   Neurological:      Mental Status: He is alert.           Lab Results   Component Value Date    WBC 7.94 01/30/2023    HGB 10.9 (L) 01/30/2023    HCT 38.3 (L) 01/30/2023     01/30/2023    CHOL 260 (H) 04/19/2020    TRIG 145 04/19/2020    HDL 91 (H) 04/19/2020    ALT 9 (L) 01/28/2023    AST 15 01/28/2023     01/30/2023    K 4.3 01/30/2023    CL 95 01/30/2023    CREATININE 0.9 01/30/2023    BUN 15 01/30/2023    CO2 32 (H) 01/30/2023    TSH 1.137 12/05/2022    INR 1.1 12/08/2022    HGBA1C 5.7 (H) 02/10/2022       Current Outpatient Medications on File Prior to Visit   Medication Sig Dispense Refill    acetaminophen (TYLENOL) 500 MG " tablet Take 1 tablet (500 mg total) by mouth every 6 (six) hours as needed for Pain. 90 tablet 0    albuterol (PROVENTIL/VENTOLIN HFA) 90 mcg/actuation inhaler Inhale 1-2 puffs into the lungs every 6 (six) hours as needed for Wheezing. Rescue 8.5 g 2    albuterol-ipratropium (DUO-NEB) 2.5 mg-0.5 mg/3 mL nebulizer solution Use 1 vial (3 mLs) by nebulization every 4 (four) hours as needed for Wheezing or Shortness of Breath. Rescue 180 mL 2    apixaban (ELIQUIS) 5 mg Tab Take 1 tablet (5 mg total) by mouth 2 (two) times daily. 180 tablet 3    arformoteroL (BROVANA) 15 mcg/2 mL Nebu Take 2 mLs (15 mcg total) by nebulization 2 (two) times a day. Controller 360 mL 3    atorvastatin (LIPITOR) 40 MG tablet Take 1 tablet (40 mg total) by mouth once daily. 90 tablet 3    bismuth subsalicylate (PEPTO BISMOL) 262 mg/15 mL suspension Take 30 mLs by mouth daily as needed for Indigestion.      budesonide (PULMICORT) 0.5 mg/2 mL nebulizer solution Take 2 mLs (0.5 mg total) by nebulization 2 (two) times a day. Controller 1440 mL 3    calcium carbonate (TUMS ORAL) Take 2 tablets by mouth daily as needed (Heartburn).      diltiaZEM (CARDIZEM) 90 MG tablet Take 2 tablets (180 mg total) by mouth once daily. 180 tablet 3    EScitalopram oxalate (LEXAPRO) 10 MG tablet Take 1 tablet (10 mg total) by mouth once daily. 90 tablet 3    ferrous sulfate 325 (65 FE) MG EC tablet Take 1 tablet (325 mg total) by mouth once daily. 30 tablet 1    folic acid (FOLVITE) 1 MG tablet Take 1 tablet (1 mg total) by mouth once daily. 30 tablet 2    formoterol (PERFOROMIST) 20 mcg/2 mL Nebu Take 2 mLs (20 mcg total) by nebulization 2 (two) times a day. Controller 360 mL 3    furosemide (LASIX) 20 MG tablet Take 1 tablet (20 mg total) by mouth once daily. 90 tablet 3    inhalat.spacing dev,large mask (COMPACT SPACE CHAMBER-LRG MASK) Spcr Use as directed 1 each 0    losartan (COZAAR) 25 MG tablet Take 1 tablet (25 mg total) by mouth once daily. 60 tablet 0     multivit-min-FA-lycopen-lutein (CERTAVITE SENIOR) 0.4 mg-300 mcg- 250 mcg Tab Take 1 tablet by mouth once daily. 30 tablet 2    nicotine (NICODERM CQ) 21 mg/24 hr Place 1 patch onto the skin once daily. 28 patch 11    pantoprazole (PROTONIX) 40 MG tablet Take 1 tablet (40 mg total) by mouth once daily. 30 tablet 11    predniSONE (DELTASONE) 10 MG tablet Take 1 tablet (10 mg total) by mouth once daily. 30 tablet 3    thiamine 100 MG tablet Take 1 tablet (100 mg total) by mouth once daily. 30 tablet 2    tiotropium bromide (SPIRIVA RESPIMAT) 2.5 mcg/actuation inhaler Inhale 2 puffs into the lungs Daily. Controller 4 g 0    [DISCONTINUED] varenicline (CHANTIX STARTING MONTH BOX) 0.5 mg (11)- 1 mg (42) tablet Take one 0.5mg tab by mouth once daily X3 days,then increase to one 0.5mg tab twice daily X4 days,then increase to one 1mg tab twice daily 53 each 0    LORazepam (ATIVAN) 0.5 MG tablet Take 1 tablet (0.5 mg total) by mouth every 6 (six) hours as needed (anxiety, air hunger). 15 tablet 0     No current facility-administered medications on file prior to visit.         Assessment:   62 y.o. male with multiple co-morbid illnesses here to follow-up with PCP and continue work-up of chronic issues:    Plan:     Problem List Items Addressed This Visit          Other    Cigarette nicotine dependence without complication    Relevant Medications    varenicline (CHANTIX STARTING MONTH BOX) 0.5 mg (11)- 1 mg (42) tablet    Tobacco abuse - Primary     Patient has end-stage COPD, O2 dependent, on chronic steroids. Continues to smoke but has cut back  Start chantix in pill packs            Health Maintenance         Date Due Completion Date    TETANUS VACCINE Never done ---    Colorectal Cancer Screening Never done ---    Shingles Vaccine (2 of 2) 03/15/2023 1/18/2023    High Dose Statin 02/01/2024 2/1/2023    Lipid Panel 04/19/2025 4/19/2020    Pneumococcal Vaccines (Age 0-64) (3 - PPSV23 if available, else PCV20) 12/19/2027  12/19/2022            Future Appointments   Date Time Provider Department Center   3/1/2023  1:30 PM Delphine Orona MD [facility] MED CLN [address] [facility]   3/15/2023 10:00 AM Raina Moore MD [facility] PAL MED [address]   4/17/2023  9:00 AM NURSE, [facility] [address] PC[facility]   4/24/2023  1:30 PM Hung Malave MD [facility]   6/6/2023  8:00 AM Mary Jo Rueda, [facility] OPTOMTY [address]         Follow up in about 1 week (around 2/27/2023). Total clinical care time was 60 min, issues addressed include: COPD, tobacco abuse    Leatha Hall MS4    Delphine Orona MD/MPH  [facility]  [phone] [employer]

## 2023-02-24 NOTE — PROGRESS NOTES
Established Patient - Audio Only Telehealth Visit     [place] is: [place]  The chief complaint leading to consultation is: follow up  Visit type: Virtual visit with audio only (telephone)  Total time spent with patient: 21 minutes       The reason for the audio only service rather than synchronous audio and video virtual visit was related to technical difficulties or patient preference/necessity.     Each patient to whom I provide medical services by telemedicine is:  (1) informed of the relationship between the physician and patient and the respective role of any other health care provider with respect to management of the patient; and (2) notified that they may decline to receive medical services by telemedicine and may withdraw from such care at any time. Patient verbally consented to receive this service via voice-only telephone call.       HPI: 63 yo male with COPD on supplemental oxygen, HTN, HLD. He lives alone in [place] and has a cat. His sister is his next of kin and is involved in his care. He was scheduled for face to face home visit but reports he sees his PCP tomorrow, we therefore converted visit to audio at his request. He admits he continues to smoke and reports he does not wish to stop at this time. We discussed smoking cessation and he verbalized understanding but does not wish to stop. I reinforced oxygen precautions and understanding was verbalized. I will schedule home visit in 6 weeks for face to face follow up.    PAST HISTORY:     Past Medical History:   Diagnosis Date    Aspiration pneumonia 5/30/2021    Asthma     Atrial fibrillation with rapid ventricular response     CHF (congestive heart failure)     COPD (chronic obstructive pulmonary disease)     home O2 at night only    Coronary artery disease     Hypertension     Lung nodule     Pneumonia     Seizures        Past Surgical History:   Procedure Laterality Date    ESOPHAGOGASTRODUODENOSCOPY N/A 2/11/2022    Procedure: EGD  (ESOPHAGOGASTRODUODENOSCOPY);  Surgeon: Cain Ortega MD;  Location: [facility] ENDO (2ND FLR);  Service: Endoscopy;  Laterality: N/A;    ESOPHAGOGASTRODUODENOSCOPY N/A 9/15/2022    Procedure: EGD (ESOPHAGOGASTRODUODENOSCOPY);  Surgeon: Leonid Chavez MD;  Location: [facility] ENDO (2ND FLR);  Service: Endoscopy;  Laterality: N/A;  PA-50 - 2nd floor  vacc-wears 2L o2-inst mail and verbal-clears 4 hrs prior-tb  8/30 pt rescheduled; updated instructions mailed-    LEFT HEART CATHETERIZATION  4/23/2020    Procedure: Left heart cath;  Surgeon: Nic Pollack MD;  Location: [facility] CATH LAB;  Service: Cardiology;;       Family History   Problem Relation Age of Onset    Cancer Father     Hypertension Sister     Stroke Sister     Stroke Brother     Diabetes Neg Hx     Heart disease Neg Hx        Social History     Socioeconomic History    Marital status: Single   Tobacco Use    Smoking status: Some Days     Packs/day: 0.25     Types: Cigarettes     Passive exposure: Past    Smokeless tobacco: Never    Tobacco comments:     1-2 cigarettes per day     1/31 Patient quick smoking 1 month ago   Substance and Sexual Activity    Alcohol use: Yes     Alcohol/week: 2.0 standard drinks     Types: 2 Cans of beer per week     Comment: every night    Drug use: No    Sexual activity: Not Currently   Social History Narrative    Patient' nephew is currently living with him in his apartment. Patient's sister Viry ([phone]) helps manage patient's care.            6/3/21    Patient is no longer staying with family. Patient is currently staying at the [facility] and working on getting into their program for more supportive housing.      Social Determinants of Health     Financial Resource Strain: Low Risk     Difficulty of Paying Living Expenses: Not hard at all   Food Insecurity: Unknown    Worried About Running Out of Food in the Last Year: Patient refused    Ran Out of Food in the Last Year: Patient refused   Transportation Needs: No  Transportation Needs    Lack of Transportation (Medical): No    Lack of Transportation (Non-Medical): No   Physical Activity: Unknown    Days of Exercise per Week: Patient refused    Minutes of Exercise per Session: Patient refused   Stress: Stress Concern Present    Feeling of Stress : Rather much   Social Connections: Socially Isolated    Frequency of Communication with Friends and Family: More than three times a week    Frequency of Social Gatherings with Friends and Family: Twice a week    Attends Bahai Services: Never    Active Member of Clubs or Organizations: No    Attends Club or Organization Meetings: Patient refused    Marital Status: Never    Housing Stability: Low Risk     Unable to Pay for Housing in the Last Year: No    Number of Places Lived in the Last Year: 2    Unstable Housing in the Last Year: No       MEDICATIONS & ALLERGIES:     Current Outpatient Medications on File Prior to Visit   Medication Sig Dispense Refill    acetaminophen (TYLENOL) 500 MG tablet Take 1 tablet (500 mg total) by mouth every 6 (six) hours as needed for Pain. 90 tablet 0    albuterol (PROVENTIL/VENTOLIN HFA) 90 mcg/actuation inhaler Inhale 1-2 puffs into the lungs every 6 (six) hours as needed for Wheezing. Rescue 8.5 g 2    albuterol-ipratropium (DUO-NEB) 2.5 mg-0.5 mg/3 mL nebulizer solution Use 1 vial (3 mLs) by nebulization every 4 (four) hours as needed for Wheezing or Shortness of Breath. Rescue 180 mL 2    apixaban (ELIQUIS) 5 mg Tab Take 1 tablet (5 mg total) by mouth 2 (two) times daily. 180 tablet 3    arformoteroL (BROVANA) 15 mcg/2 mL Nebu Take 2 mLs (15 mcg total) by nebulization 2 (two) times a day. Controller 360 mL 3    atorvastatin (LIPITOR) 40 MG tablet Take 1 tablet (40 mg total) by mouth once daily. 90 tablet 3    bismuth subsalicylate (PEPTO BISMOL) 262 mg/15 mL suspension Take 30 mLs by mouth daily as needed for Indigestion.      budesonide (PULMICORT) 0.5 mg/2 mL nebulizer solution Take 2  mLs (0.5 mg total) by nebulization 2 (two) times a day. Controller 1440 mL 3    calcium carbonate (TUMS ORAL) Take 2 tablets by mouth daily as needed (Heartburn).      diltiaZEM (CARDIZEM) 90 MG tablet Take 2 tablets (180 mg total) by mouth once daily. 180 tablet 3    EScitalopram oxalate (LEXAPRO) 10 MG tablet Take 1 tablet (10 mg total) by mouth once daily. 90 tablet 3    ferrous sulfate 325 (65 FE) MG EC tablet Take 1 tablet (325 mg total) by mouth once daily. 30 tablet 1    folic acid (FOLVITE) 1 MG tablet Take 1 tablet (1 mg total) by mouth once daily. 30 tablet 2    formoterol (PERFOROMIST) 20 mcg/2 mL Nebu Take 2 mLs (20 mcg total) by nebulization 2 (two) times a day. Controller 360 mL 3    furosemide (LASIX) 20 MG tablet Take 1 tablet (20 mg total) by mouth once daily. 90 tablet 3    inhalat.spacing dev,large mask (COMPACT SPACE CHAMBER-LRG MASK) Spcr Use as directed 1 each 0    LORazepam (ATIVAN) 0.5 MG tablet Take 1 tablet (0.5 mg total) by mouth every 6 (six) hours as needed (anxiety, air hunger). 15 tablet 0    losartan (COZAAR) 25 MG tablet Take 1 tablet (25 mg total) by mouth once daily. 60 tablet 0    multivit-min-FA-lycopen-lutein (CERTAVITE SENIOR) 0.4 mg-300 mcg- 250 mcg Tab Take 1 tablet by mouth once daily. 30 tablet 2    nicotine (NICODERM CQ) 21 mg/24 hr Place 1 patch onto the skin once daily. 28 patch 11    pantoprazole (PROTONIX) 40 MG tablet Take 1 tablet (40 mg total) by mouth once daily. 30 tablet 11    predniSONE (DELTASONE) 10 MG tablet Take 1 tablet (10 mg total) by mouth once daily. 30 tablet 3    thiamine 100 MG tablet Take 1 tablet (100 mg total) by mouth once daily. 30 tablet 2    tiotropium bromide (SPIRIVA RESPIMAT) 2.5 mcg/actuation inhaler Inhale 2 puffs into the lungs Daily. Controller 4 g 0     No current facility-administered medications on file prior to visit.        Review of patient's allergies indicates:   Allergen Reactions    Benazepril Swelling             1. Chronic  bronchitis, unspecified chronic bronchitis type    2. Pulmonary cachexia due to chronic obstructive pulmonary disease  Overview:  Hypoglycemia, hypoalbuminemia, low muscle mass, difficulty ambulating (slow gait speed), fatigue. BMI 19-20, weight variable due to collection method discrepancies. Numerous chronic conditions causing this multifactoral decline: COPD, h/o alcohol abuse, hypermetabolism with work of breath    Orders:  -     Ambulatory referral/consult to [facility]           Patient Instructions Given:  - Continue all medications, treatments and therapies as ordered.   - Follow all instructions, recommendations as discussed.  - Maintain Safety Precautions at all times.  - Attend all medical appointments as scheduled.  - For worsening symptoms: call Primary Care Physician or Nurse Practitioner.  - For emergencies, call 911 or immediately report to the nearest emergency room.    After Visit Medication List :     Medication List            Accurate as of February 7, 2023 11:59 PM. If you have any questions, ask your nurse or doctor.                CONTINUE taking these medications      albuterol 90 mcg/actuation inhaler  Commonly known as: PROVENTIL/VENTOLIN HFA  Inhale 1-2 puffs into the lungs every 6 (six) hours as needed for Wheezing. Rescue     albuterol-ipratropium 2.5 mg-0.5 mg/3 mL nebulizer solution  Commonly known as: DUO-NEB  Use 1 vial (3 mLs) by nebulization every 4 (four) hours as needed for Wheezing or Shortness of Breath. Rescue     arformoteroL 15 mcg/2 mL Nebu  Commonly known as: BROVANA  Take 2 mLs (15 mcg total) by nebulization 2 (two) times a day. Controller     atorvastatin 40 MG tablet  Commonly known as: LIPITOR  Take 1 tablet (40 mg total) by mouth once daily.     bismuth subsalicylate 262 mg/15 mL suspension  Commonly known as: PEPTO BISMOL     budesonide 0.5 mg/2 mL nebulizer solution  Commonly known as: PULMICORT  Take 2 mLs (0.5 mg total) by  nebulization 2 (two) times a day. Controller     CERTAVITE SENIOR 0.4 mg-300 mcg- 250 mcg Tab  Generic drug: multivit-min-FA-lycopen-lutein  Take 1 tablet by mouth once daily.     COMPACT SPACE CHAMBER-LRG MASK Spcr  Generic drug: inhalat.spacing dev,large mask  Use as directed     diltiaZEM 90 MG tablet  Commonly known as: CARDIZEM  Take 2 tablets (180 mg total) by mouth once daily.     ELIQUIS 5 mg Tab  Generic drug: apixaban  Take 1 tablet (5 mg total) by mouth 2 (two) times daily.     EScitalopram oxalate 10 MG tablet  Commonly known as: LEXAPRO  Take 1 tablet (10 mg total) by mouth once daily.     ferrous sulfate 325 (65 FE) MG EC tablet  Take 1 tablet (325 mg total) by mouth once daily.     folic acid 1 MG tablet  Commonly known as: FOLVITE  Take 1 tablet (1 mg total) by mouth once daily.     formoterol 20 mcg/2 mL Nebu  Commonly known as: PERFOROMIST  Take 2 mLs (20 mcg total) by nebulization 2 (two) times a day. Controller     furosemide 20 MG tablet  Commonly known as: LASIX  Take 1 tablet (20 mg total) by mouth once daily.     LORazepam 0.5 MG tablet  Commonly known as: ATIVAN  Take 1 tablet (0.5 mg total) by mouth every 6 (six) hours as needed (anxiety, air hunger).     losartan 25 MG tablet  Commonly known as: COZAAR  Take 1 tablet (25 mg total) by mouth once daily.     nicotine 21 mg/24 hr  Commonly known as: NICODERM CQ  Place 1 patch onto the skin once daily.     PAIN RELIEF (ACETAMINOPHEN) 500 MG tablet  Generic drug: acetaminophen  Take 1 tablet (500 mg total) by mouth every 6 (six) hours as needed for Pain.     pantoprazole 40 MG tablet  Commonly known as: PROTONIX  Take 1 tablet (40 mg total) by mouth once daily.     predniSONE 10 MG tablet  Commonly known as: DELTASONE  Take 1 tablet (10 mg total) by mouth once daily.     SPIRIVA RESPIMAT 2.5 mcg/actuation inhaler  Generic drug: tiotropium bromide  Inhale 2 puffs into the lungs Daily. Controller     thiamine 100 MG tablet  Take 1 tablet (100 mg  total) by mouth once daily.     TUMS ORAL            Future Appointments   Date Time Provider Department Center   3/1/2023  1:30 PM Delphine Orona MD [facility] MED CLN [address] [facility]   3/15/2023 10:00 AM Raina Moore MD [facility] PAL MED [address]   4/17/2023  9:00 AM NURSE, [facility] CL[facility]IC [facility] MED CLN [facility]   4/24/2023  1:30 PM Hung Malave MD [facility] SLEEP [facility]   6/6/2023  8:00 AM Mary Jo Rueda, [place] [facility] OPTOMTY [address]     Risks of environmental exposure to coronavirus discussed including: social distancing, hand hygiene, and limiting departures from the home for necessities only. Reports understanding and willingness to comply.     Signature:    Susan Rae, MSN, APRN, FNP-C  [facility]            This service was not originating from a related E/M service provided within the previous 7 days nor will  to an E/M service or procedure within the next 24 hours or my soonest available appointment.  Prevailing standard of care was able to be met in this audio-only visit.

## 2023-02-24 NOTE — PATIENT INSTRUCTIONS
Instructions:  - [facility] Nurse Practitioner to schedule home follow-up visit with patient in 4-6 weeks or as needed.  - Continue all medications, treatments and therapies as ordered.   - Follow all instructions, recommendations as discussed.  - Maintain Safety Precautions at all times.  - Attend all medical appointments as scheduled.  - For worsening symptoms: call Primary Care Physician or Nurse Practitioner.  - For emergencies, call 911 or immediately report to the nearest emergency room.  - Limit Risks of environmental exposure to coronavirus/COVID-19 as discussed including: social distancing, hand hygiene, and limiting departures from the home for necessities only.

## 2023-02-27 NOTE — PROGRESS NOTES
Adherence packed using Dispill for 7 days:   (Compressed monthly)       Morning Pocket:   Diltiazam 90 mg (2 tablets)   Eliquis 5 mg   Escitalopram 10mg  Furosemide 20 mg   Ferrous Sulfate 325mg   Pantoprazole 40 mg   Prednisone 10 mg  Varenicline 1 mg     Evening Pocket:   Atorvastatin 40 mg   Eliquis 5 mg   Folic Acid 1mg   MVI 50+   Thiamine 100 mg   Varenicline 1 mg

## 2023-03-01 ENCOUNTER — OFFICE VISIT (OUTPATIENT)
Dept: PRIMARY CARE CLINIC | Facility: CLINIC | Age: 63
End: 2023-03-01
Payer: MEDICARE

## 2023-03-01 ENCOUNTER — TELEPHONE (OUTPATIENT)
Dept: PRIMARY CARE CLINIC | Facility: CLINIC | Age: 63
End: 2023-03-01
Payer: MEDICARE

## 2023-03-01 VITALS
OXYGEN SATURATION: 96 % | HEART RATE: 75 BPM | SYSTOLIC BLOOD PRESSURE: 110 MMHG | BODY MASS INDEX: 21.41 KG/M2 | DIASTOLIC BLOOD PRESSURE: 72 MMHG | HEIGHT: 66 IN | TEMPERATURE: 98 F | WEIGHT: 133.25 LBS

## 2023-03-01 DIAGNOSIS — Z76.89 POOR DENTITION REQUIRING REFERRAL TO DENTISTRY: ICD-10-CM

## 2023-03-01 DIAGNOSIS — R64 PULMONARY CACHEXIA DUE TO CHRONIC OBSTRUCTIVE PULMONARY DISEASE: ICD-10-CM

## 2023-03-01 DIAGNOSIS — J44.9 PULMONARY CACHEXIA DUE TO CHRONIC OBSTRUCTIVE PULMONARY DISEASE: ICD-10-CM

## 2023-03-01 PROCEDURE — 1159F MED LIST DOCD IN RCRD: CPT | Mod: CPTII,S$GLB,, | Performed by: INTERNAL MEDICINE

## 2023-03-01 PROCEDURE — 3078F DIAST BP <80 MM HG: CPT | Mod: CPTII,S$GLB,, | Performed by: INTERNAL MEDICINE

## 2023-03-01 PROCEDURE — 3008F PR BODY MASS INDEX (BMI) DOCUMENTED: ICD-10-PCS | Mod: CPTII,S$GLB,, | Performed by: INTERNAL MEDICINE

## 2023-03-01 PROCEDURE — 3074F PR MOST RECENT SYSTOLIC BLOOD PRESSURE < 130 MM HG: ICD-10-PCS | Mod: CPTII,S$GLB,, | Performed by: INTERNAL MEDICINE

## 2023-03-01 PROCEDURE — 4010F PR ACE/ARB THEARPY RXD/TAKEN: ICD-10-PCS | Mod: CPTII,S$GLB,, | Performed by: INTERNAL MEDICINE

## 2023-03-01 PROCEDURE — 99211 OFF/OP EST MAY X REQ PHY/QHP: CPT | Mod: S$GLB,,, | Performed by: INTERNAL MEDICINE

## 2023-03-01 PROCEDURE — 4010F ACE/ARB THERAPY RXD/TAKEN: CPT | Mod: CPTII,S$GLB,, | Performed by: INTERNAL MEDICINE

## 2023-03-01 PROCEDURE — 99211 PR OFFICE/OUTPT VISIT, EST, LEVL I: ICD-10-PCS | Mod: S$GLB,,, | Performed by: INTERNAL MEDICINE

## 2023-03-01 PROCEDURE — 3074F SYST BP LT 130 MM HG: CPT | Mod: CPTII,S$GLB,, | Performed by: INTERNAL MEDICINE

## 2023-03-01 PROCEDURE — 1159F PR MEDICATION LIST DOCUMENTED IN MEDICAL RECORD: ICD-10-PCS | Mod: CPTII,S$GLB,, | Performed by: INTERNAL MEDICINE

## 2023-03-01 PROCEDURE — 3008F BODY MASS INDEX DOCD: CPT | Mod: CPTII,S$GLB,, | Performed by: INTERNAL MEDICINE

## 2023-03-01 PROCEDURE — 3078F PR MOST RECENT DIASTOLIC BLOOD PRESSURE < 80 MM HG: ICD-10-PCS | Mod: CPTII,S$GLB,, | Performed by: INTERNAL MEDICINE

## 2023-03-01 NOTE — PROGRESS NOTES
"Primary Care Provider Appointment - [facility]  SHARED NOTE: Shadi Quezada (MD Trainee), Dr Orona (Attending)    Subjective:      Patient ID: Baltazar Mccray is a 62 y.o. male with COPD, Afib, HTN, HLD, low health literacy, tobacco abuse, and frequent hospitalizations      Chief Complaint: Follow-up    Prior to this visit, patient's last encounter with PCP was 2/20/2023.    Patient reports today that he is doing well. Patient reports taking his pill packs as instructed. He reports that he is using his bipap nightly and is having no problems. He is somewhat hyperactive and impatient during the visit and reports he is ready to leave as soon the the visit began.     Only issues that the patient reports is wishing to have his optometry appointment moved up sooner that 6/6/2023. Will call optometry and try to facilitate this.    Patient also expressed wish to see a dentist. Call [employer] medic[employer] approved dentist "[facility]". Office reports they can see him today at 3:30. Will provide lyft services for patient to and from appointment.        4Ms for Medical Decision-Making in Older Adults    Last Completed EAWV: None    MOBILITY:  Get Up and Go:  No flowsheet data found.  Activities of Daily Living:  No flowsheet data found.  Whisper Test:  No flowsheet data found.  Disability Status:  Disability Status 10/3/2018   Are you deaf or do you have serious difficulty hearing? No   Are you blind or do you have serious difficulty seeing, even when wearing glasses? No   Because of a physical, mental, or emotional condition, do you have serious difficulty concentrating, remembering, or making decisions? No   Do you have serious difficulty walking or climbing stairs? No   Do you have difficulty dressing or bathing? No   Because of a physical, mental, or emotional condition, do you have difficulty doing errands alone such as visiting a doctor's office or shopping? No     Nutrition Screening:  No flowsheet data " found. Screening Score: 0-7 Malnourished, 8-11 At Risk, 12-14 Normal    MENTATION:   Depression Patient Health Questionnaire:  Depression Patient Health Questionnaire 3/1/2023   Over the last two weeks how often have you been bothered by little interest or pleasure in doing things Not at all   Over the last two weeks how often have you been bothered by feeling down, depressed or hopeless Not at all   PHQ-2 Total Score 0   PHQ-4 Total Score -     Has Dementia Dx: No    Cognitive Function Screening:  No flowsheet data found.  Cognitive Function Screening Total - Less than 4 = Abnormal,  Greater than or equal to 4 = Normal    MEDICATIONS:  High Risk Medications:  Total Active Medications: 1  EScitalopram oxalate - 10 MG    WHAT MATTERS MOST:  Advance Care Planning   ACP Status:   Patient has had an ACP conversation  Living Will: Yes  Power of : Yes  LaPOST: No    What is most important right now is to focus on avoiding the hospital    Accordingly, we have decided that the best plan to meet the patient's goals includes continuing with treatment                   Social History     Socioeconomic History    Marital status: Single   Tobacco Use    Smoking status: Some Days     Packs/day: 0.25     Types: Cigarettes     Passive exposure: Past    Smokeless tobacco: Never    Tobacco comments:     1-2 cigarettes per day     1/31 Patient quick smoking 1 month ago   Substance and Sexual Activity    Alcohol use: Yes     Alcohol/week: 2.0 standard drinks     Types: 2 Cans of beer per week     Comment: every night    Drug use: No    Sexual activity: Not Currently   Social History Narrative    Patient' nephew is currently living with him in his apartment. Patient's sister Viry ([phone]) helps manage patient's care.            6/3/21    Patient is no longer staying with family. Patient is currently staying at the [facility] and working on getting into their program for more supportive housing.      Social Determinants  "of Health     Financial Resource Strain: Low Risk     Difficulty of Paying Living Expenses: Not hard at all   Food Insecurity: Unknown    Worried About Running Out of Food in the Last Year: Patient refused    Ran Out of Food in the Last Year: Patient refused   Transportation Needs: No Transportation Needs    Lack of Transportation (Medical): No    Lack of Transportation (Non-Medical): No   Physical Activity: Unknown    Days of Exercise per Week: Patient refused    Minutes of Exercise per Session: Patient refused   Stress: Stress Concern Present    Feeling of Stress : Rather much   Social Connections: Socially Isolated    Frequency of Communication with Friends and Family: More than three times a week    Frequency of Social Gatherings with Friends and Family: Twice a week    Attends Gnosticist Services: Never    Active Member of Clubs or Organizations: No    Attends Club or Organization Meetings: Patient refused    Marital Status: Never    Housing Stability: Low Risk     Unable to Pay for Housing in the Last Year: No    Number of Places Lived in the Last Year: 2    Unstable Housing in the Last Year: No           Objective:   /72 (BP Location: Left arm, Patient Position: Sitting, BP Method: Medium (Manual))   Pulse 75   Temp 97.9 °F (36.6 °C) (Oral)   Ht 5' 6" (1.676 m)   Wt 60.5 kg (133 lb 4.3 oz)   SpO2 96%   BMI 21.51 kg/m²             Lab Results   Component Value Date    WBC 7.94 01/30/2023    HGB 10.9 (L) 01/30/2023    HCT 38.3 (L) 01/30/2023     01/30/2023    CHOL 260 (H) 04/19/2020    TRIG 145 04/19/2020    HDL 91 (H) 04/19/2020    ALT 9 (L) 01/28/2023    AST 15 01/28/2023     01/30/2023    K 4.3 01/30/2023    CL 95 01/30/2023    CREATININE 0.9 01/30/2023    BUN 15 01/30/2023    CO2 32 (H) 01/30/2023    TSH 1.137 12/05/2022    INR 1.1 12/08/2022    HGBA1C 5.7 (H) 02/10/2022       Current Outpatient Medications on File Prior to Visit   Medication Sig Dispense Refill    " acetaminophen (TYLENOL) 500 MG tablet Take 1 tablet (500 mg total) by mouth every 6 (six) hours as needed for Pain. 90 tablet 0    albuterol (PROVENTIL/VENTOLIN HFA) 90 mcg/actuation inhaler Inhale 1-2 puffs into the lungs every 6 (six) hours as needed for Wheezing. Rescue 8.5 g 2    albuterol-ipratropium (DUO-NEB) 2.5 mg-0.5 mg/3 mL nebulizer solution Use 1 vial (3 mLs) by nebulization every 4 (four) hours as needed for Wheezing or Shortness of Breath. Rescue 180 mL 2    apixaban (ELIQUIS) 5 mg Tab Take 1 tablet (5 mg total) by mouth 2 (two) times daily. 180 tablet 3    arformoteroL (BROVANA) 15 mcg/2 mL Nebu Take 2 mLs (15 mcg total) by nebulization 2 (two) times a day. Controller 360 mL 3    atorvastatin (LIPITOR) 40 MG tablet Take 1 tablet (40 mg total) by mouth once daily. 90 tablet 3    bismuth subsalicylate (PEPTO BISMOL) 262 mg/15 mL suspension Take 30 mLs by mouth daily as needed for Indigestion.      budesonide (PULMICORT) 0.5 mg/2 mL nebulizer solution Take 2 mLs (0.5 mg total) by nebulization 2 (two) times a day. Controller 1440 mL 3    calcium carbonate (TUMS ORAL) Take 2 tablets by mouth daily as needed (Heartburn).      diltiaZEM (CARDIZEM) 90 MG tablet Take 2 tablets (180 mg total) by mouth once daily. 180 tablet 3    EScitalopram oxalate (LEXAPRO) 10 MG tablet Take 1 tablet (10 mg total) by mouth once daily. 90 tablet 3    ferrous sulfate 325 (65 FE) MG EC tablet Take 1 tablet (325 mg total) by mouth once daily. 30 tablet 1    folic acid (FOLVITE) 1 MG tablet Take 1 tablet (1 mg total) by mouth once daily. 30 tablet 2    formoterol (PERFOROMIST) 20 mcg/2 mL Nebu Take 2 mLs (20 mcg total) by nebulization 2 (two) times a day. Controller 360 mL 3    furosemide (LASIX) 20 MG tablet Take 1 tablet (20 mg total) by mouth once daily. 90 tablet 3    inhalat.spacing dev,large mask (COMPACT SPACE CHAMBER-LRG MASK) Spcr Use as directed 1 each 0    losartan (COZAAR) 25 MG tablet Take 1 tablet (25 mg total) by  "mouth once daily. 60 tablet 0    multivit-min-FA-lycopen-lutein (CERTAVITE SENIOR) 0.4 mg-300 mcg- 250 mcg Tab Take 1 tablet by mouth once daily. 30 tablet 2    nicotine (NICODERM CQ) 21 mg/24 hr Place 1 patch onto the skin once daily. 28 patch 11    pantoprazole (PROTONIX) 40 MG tablet Take 1 tablet (40 mg total) by mouth once daily. 30 tablet 11    predniSONE (DELTASONE) 10 MG tablet Take 1 tablet (10 mg total) by mouth once daily. 30 tablet 3    thiamine 100 MG tablet Take 1 tablet (100 mg total) by mouth once daily. 30 tablet 2    tiotropium bromide (SPIRIVA RESPIMAT) 2.5 mcg/actuation inhaler Inhale 2 puffs into the lungs Daily. Controller 4 g 0    varenicline (CHANTIX STARTING MONTH BOX) 0.5 mg (11)- 1 mg (42) tablet Take one 0.5mg tab by mouth once daily X3 days,then increase to one 0.5mg tab twice daily X4 days,then increase to one 1mg tab twice daily 53 each 0    LORazepam (ATIVAN) 0.5 MG tablet Take 1 tablet (0.5 mg total) by mouth every 6 (six) hours as needed (anxiety, air hunger). 15 tablet 0     No current facility-administered medications on file prior to visit.         Assessment:   62 y.o. male with multiple co-morbid illnesses here to follow-up with PCP and continue work-up of chronic issues:    Plan:     Problem List Items Addressed This Visit          Pulmonary    Pulmonary cachexia due to chronic obstructive pulmonary disease     Hypoglycemia, hypoalbuminemia, low muscle mass, difficulty ambulating (slow gait speed), fatigue. BMI 19-20, weight variable due to collection method discrepancies. Numerous chronic conditions causing this multifactoral decline: COPD, h/o alcohol abuse, hypermetabolism with work of breath  Poor prognosis  High mortality associated with this phenomenan  Monitor weights and treat COPD  Strongly advised to avoid smoking  Previously reported stopping recently (1 week ago)  Today states he smokes "2 cigarettes per week"  Had intro meeting with [employer] and he elected " "to postpone enrollment  Will continue to discuss readiness            Other    Poor dentition requiring referral to dentistry     "[facility]" willing to evaluate urgently  Provide assistance with scheduling and transport today            Health Maintenance         Date Due Completion Date    TETANUS VACCINE Never done ---    Colorectal Cancer Screening Never done ---    Shingles Vaccine (2 of 2) 03/15/2023 1/18/2023    High Dose Statin 03/01/2024 3/1/2023    Lipid Panel 04/19/2025 4/19/2020    Pneumococcal Vaccines (Age 0-64) (3 - PPSV23 if available, else PCV20) 12/19/2027 12/19/2022            Future Appointments   Date Time Provider Department Center   3/8/2023  2:30 PM Delphine Orona MD [facility] MED CLN [address] PCW   3/15/2023 10:00 AM Raina Moore MD [facility] PAL MED [address]   3/15/2023 12:00 PM Delphine Orona MD [facility] MED CLN [address] PCW   3/22/2023  8:00 AM Susan Rae, NP [address]   4/17/2023  9:00 AM NURSE, [facility] MED CLN [address] PCW   4/24/2023  1:30 PM Hung Malave MD [facility]   6/6/2023  8:00 AM Mary Jo Rueda, OD [facility] OPTOMTY [address]         Follow up in about 1 week (around 3/8/2023). Total clinical care time was 10 min, issues addressed include: COPD and dentition    Delphine Orona MD/MPH  [facility]  [phone] spectralink             "

## 2023-03-01 NOTE — TELEPHONE ENCOUNTER
----- Message from Yennifer Little sent at 3/1/2023  2:30 PM CST -----  Contact: Self/[phone]  Pt stated that he is calling in regards to needing to speak Josias again . Please advise

## 2023-03-02 NOTE — ASSESSMENT & PLAN NOTE
"Hypoglycemia, hypoalbuminemia, low muscle mass, difficulty ambulating (slow gait speed), fatigue. BMI 19-20, weight variable due to collection method discrepancies. Numerous chronic conditions causing this multifactoral decline: COPD, h/o alcohol abuse, hypermetabolism with work of breath  · Poor prognosis  · High mortality associated with this phenomenan  · Monitor weights and treat COPD  · Strongly advised to avoid smoking  · Previously reported stopping recently (1 week ago)  · Today states he smokes "2 cigarettes per week"  · Had intro meeting with hospice company and he elected to postpone enrollment  · Will continue to discuss readiness  "

## 2023-03-02 NOTE — ASSESSMENT & PLAN NOTE
""[facility]" willing to evaluate urgently  · Provide assistance with scheduling and transport today  "

## 2023-03-03 ENCOUNTER — DOCUMENT SCAN (OUTPATIENT)
Dept: HOME HEALTH SERVICES | Facility: HOSPITAL | Age: 63
End: 2023-03-03
Payer: MEDICARE

## 2023-03-06 ENCOUNTER — HOSPITAL ENCOUNTER (EMERGENCY)
Facility: HOSPITAL | Age: 63
Discharge: HOME | End: 2023-03-06
Attending: EMERGENCY MEDICINE
Payer: MEDICARE

## 2023-03-06 VITALS
BODY MASS INDEX: 21.36 KG/M2 | HEART RATE: 87 BPM | TEMPERATURE: 99 F | OXYGEN SATURATION: 94 % | HEIGHT: 66 IN | SYSTOLIC BLOOD PRESSURE: 110 MMHG | WEIGHT: 132.94 LBS | DIASTOLIC BLOOD PRESSURE: 74 MMHG | RESPIRATION RATE: 16 BRPM

## 2023-03-06 DIAGNOSIS — R00.0 TACHYCARDIA: ICD-10-CM

## 2023-03-06 DIAGNOSIS — J42 CHRONIC BRONCHITIS, UNSPECIFIED CHRONIC BRONCHITIS TYPE: Chronic | ICD-10-CM

## 2023-03-06 DIAGNOSIS — R06.02 SOB (SHORTNESS OF BREATH): Primary | ICD-10-CM

## 2023-03-06 DIAGNOSIS — J44.1 COPD EXACERBATION: ICD-10-CM

## 2023-03-06 LAB
ALBUMIN SERPL BCP-MCNC: 3.3 G/DL (ref 3.5–5.2)
ALP SERPL-CCNC: 86 U/L (ref 55–135)
ALT SERPL W/O P-5'-P-CCNC: 84 U/L (ref 10–44)
ANION GAP SERPL CALC-SCNC: 12 MMOL/L (ref 8–16)
AST SERPL-CCNC: 76 U/L (ref 10–40)
BASOPHILS # BLD AUTO: 0.07 K/UL (ref 0–0.2)
BASOPHILS NFR BLD: 0.9 % (ref 0–1.9)
BILIRUB SERPL-MCNC: 0.9 MG/DL (ref 0.1–1)
BUN SERPL-MCNC: 15 MG/DL (ref 6–20)
CALCIUM SERPL-MCNC: 9.3 MG/DL (ref 8.7–10.5)
CHLORIDE SERPL-SCNC: 89 MMOL/L (ref 95–110)
CO2 SERPL-SCNC: 30 MMOL/L (ref 23–29)
CREAT SERPL-MCNC: 0.9 MG/DL (ref 0.5–1.4)
DIFFERENTIAL METHOD: ABNORMAL
EOSINOPHIL # BLD AUTO: 0.2 K/UL (ref 0–0.5)
EOSINOPHIL NFR BLD: 1.9 % (ref 0–8)
ERYTHROCYTE [DISTWIDTH] IN BLOOD BY AUTOMATED COUNT: 17.9 % (ref 11.5–14.5)
EST. GFR  (NO RACE VARIABLE): >60 ML/MIN/1.73 M^2
GLUCOSE SERPL-MCNC: 55 MG/DL (ref 70–110)
HCT VFR BLD AUTO: 46.3 % (ref 40–54)
HGB BLD-MCNC: 14.1 G/DL (ref 14–18)
IMM GRANULOCYTES # BLD AUTO: 0.03 K/UL (ref 0–0.04)
IMM GRANULOCYTES NFR BLD AUTO: 0.4 % (ref 0–0.5)
LYMPHOCYTES # BLD AUTO: 1.9 K/UL (ref 1–4.8)
LYMPHOCYTES NFR BLD: 23.6 % (ref 18–48)
MCH RBC QN AUTO: 24.9 PG (ref 27–31)
MCHC RBC AUTO-ENTMCNC: 30.5 G/DL (ref 32–36)
MCV RBC AUTO: 82 FL (ref 82–98)
MONOCYTES # BLD AUTO: 0.8 K/UL (ref 0.3–1)
MONOCYTES NFR BLD: 10.2 % (ref 4–15)
NEUTROPHILS # BLD AUTO: 5 K/UL (ref 1.8–7.7)
NEUTROPHILS NFR BLD: 63 % (ref 38–73)
NRBC BLD-RTO: 0 /100 WBC
PLATELET # BLD AUTO: 333 K/UL (ref 150–450)
PMV BLD AUTO: 11.1 FL (ref 9.2–12.9)
POTASSIUM SERPL-SCNC: 4.1 MMOL/L (ref 3.5–5.1)
PROT SERPL-MCNC: 7.2 G/DL (ref 6–8.4)
RBC # BLD AUTO: 5.67 M/UL (ref 4.6–6.2)
SODIUM SERPL-SCNC: 131 MMOL/L (ref 136–145)
WBC # BLD AUTO: 7.98 K/UL (ref 3.9–12.7)

## 2023-03-06 PROCEDURE — 25000242 PHARM REV CODE 250 ALT 637 W/ HCPCS: Performed by: EMERGENCY MEDICINE

## 2023-03-06 PROCEDURE — 80053 COMPREHEN METABOLIC PANEL: CPT | Performed by: EMERGENCY MEDICINE

## 2023-03-06 PROCEDURE — 99284 EMERGENCY DEPT VISIT MOD MDM: CPT | Mod: 25

## 2023-03-06 PROCEDURE — 94761 N-INVAS EAR/PLS OXIMETRY MLT: CPT

## 2023-03-06 PROCEDURE — 27000221 HC OXYGEN, UP TO 24 HOURS

## 2023-03-06 PROCEDURE — 99284 EMERGENCY DEPT VISIT MOD MDM: CPT | Mod: ,,, | Performed by: EMERGENCY MEDICINE

## 2023-03-06 PROCEDURE — 85025 COMPLETE CBC W/AUTO DIFF WBC: CPT | Performed by: EMERGENCY MEDICINE

## 2023-03-06 PROCEDURE — 93010 ELECTROCARDIOGRAM REPORT: CPT | Mod: ,,, | Performed by: INTERNAL MEDICINE

## 2023-03-06 PROCEDURE — 93005 ELECTROCARDIOGRAM TRACING: CPT

## 2023-03-06 PROCEDURE — 99284 PR EMERGENCY DEPT VISIT,LEVEL IV: ICD-10-PCS | Mod: ,,, | Performed by: EMERGENCY MEDICINE

## 2023-03-06 PROCEDURE — 93010 EKG 12-LEAD: ICD-10-PCS | Mod: ,,, | Performed by: INTERNAL MEDICINE

## 2023-03-06 RX ORDER — IPRATROPIUM BROMIDE AND ALBUTEROL SULFATE 2.5; .5 MG/3ML; MG/3ML
3 SOLUTION RESPIRATORY (INHALATION)
Status: COMPLETED | OUTPATIENT
Start: 2023-03-06 | End: 2023-03-06

## 2023-03-06 RX ADMIN — IPRATROPIUM BROMIDE AND ALBUTEROL SULFATE 3 ML: 2.5; .5 SOLUTION RESPIRATORY (INHALATION) at 08:03

## 2023-03-06 NOTE — ED PROVIDER NOTES
Encounter Date: 3/6/2023       History     Chief Complaint   Patient presents with    Shortness of Breath     Hx of copd. Ran out of oxygen at home tonight. Pt was 82% on room air     HPI  62-year-old man with medical history of HTN, CAD, AFib (on eliquis), HFpEF, COPD on home O2 (2L) and seizures presents with shortness of breath.  States he ran out of oxygen at home.  No other complaints.  No chest pain.  No cough.  States he also has been using his inhalers as prescribed.    Also states that he has a BiPAP machine at home.    States his oxygen is supposed to be delivered tomorrow.      Review of patient's allergies indicates:   Allergen Reactions    Benazepril Swelling     Past Medical History:   Diagnosis Date    Aspiration pneumonia 5/30/2021    Asthma     Atrial fibrillation with rapid ventricular response     CHF (congestive heart failure)     COPD (chronic obstructive pulmonary disease)     home O2 at night only    Coronary artery disease     Hypertension     Lung nodule     Pneumonia     Seizures      Past Surgical History:   Procedure Laterality Date    ESOPHAGOGASTRODUODENOSCOPY N/A 2/11/2022    Procedure: EGD (ESOPHAGOGASTRODUODENOSCOPY);  Surgeon: Cain Ortega MD;  Location: [facility] ([address] [place] [address]);  Service: Endoscopy;  Laterality: N/A;    ESOPHAGOGASTRODUODENOSCOPY N/A 9/15/2022    Procedure: EGD (ESOPHAGOGASTRODUODENOSCOPY);  Surgeon: Leonid Chavez MD;  Location: [address]);  Service: Endoscopy;  Laterality: N/A;  PA-50 - 2nd floor  vacc-wears 2L o2-inst mail and verbal-clears 4 hrs prior-tb  8/30 pt rescheduled; updated instructions mailed-    LEFT HEART CATHETERIZATION  4/23/2020    Procedure: Left heart cath;  Surgeon: Nic Pollack MD;  Location: [facility] CATH LAB;  Service: Cardiology;;     Family History   Problem Relation Age of Onset    Cancer Father     Hypertension Sister     Stroke Sister     Stroke Brother     Diabetes Neg Hx     Heart disease Neg Hx      Social History      Tobacco Use    Smoking status: Some Days     Packs/day: 0.25     Types: Cigarettes     Passive exposure: Past    Smokeless tobacco: Never    Tobacco comments:     1-2 cigarettes per day     1/31 Patient quick smoking 1 month ago   Substance Use Topics    Alcohol use: Yes     Alcohol/week: 2.0 standard drinks     Types: 2 Cans of beer per week     Comment: every night    Drug use: No     Review of Systems   Constitutional:  Negative for fatigue and fever.   HENT:  Negative for sore throat.    Respiratory:  Positive for shortness of breath. Negative for cough.    Cardiovascular:  Negative for chest pain and leg swelling.   Gastrointestinal:  Negative for nausea and vomiting.   Genitourinary:  Negative for dysuria.   Musculoskeletal:  Negative for back pain.   Skin:  Negative for rash.   Neurological:  Positive for weakness. Negative for headaches.     Physical Exam     Initial Vitals [03/06/23 0325]   BP Pulse Resp Temp SpO2   (!) 173/110 102 20 98.1 °F (36.7 °C) 100 %      MAP       --         Physical Exam    Nursing note and vitals reviewed.  Constitutional:   Chronically ill appearring in NAD   HENT:   Head: Normocephalic and atraumatic.   Eyes: Conjunctivae are normal.   Neck: Neck supple.   Cardiovascular:  Normal rate and regular rhythm.           Murmur heard.  Pulmonary/Chest: No respiratory distress.   Mild prolonged expiration   Abdominal: Abdomen is soft. He exhibits no distension.   Musculoskeletal:         General: No edema.      Cervical back: Neck supple.     Neurological: He is alert and oriented to person, place, and time.   Skin: Skin is warm and dry.       ED Course   Procedures  Labs Reviewed   CBC W/ AUTO DIFFERENTIAL - Abnormal; Notable for the following components:       Result Value    MCH 24.9 (*)     MCHC 30.5 (*)     RDW 17.9 (*)     All other components within normal limits   COMPREHENSIVE METABOLIC PANEL - Abnormal; Notable for the following components:    Sodium 131 (*)     Chloride  89 (*)     CO2 30 (*)     Glucose 55 (*)     Albumin 3.3 (*)     AST 76 (*)     ALT 84 (*)     All other components within normal limits     EKG Readings: (Independently Interpreted)   Sinus tachycardia, rate 113, No AGUSTIN     ECG Results              EKG 12-lead (Final result)  Result time 03/06/23 09:10:29      Final result by Interface, Lab In [facility] (03/06/23 09:10:29)                   Narrative:    Test Reason : R00.0,    Vent. Rate : 113 BPM     Atrial Rate : 113 BPM     P-R Int : 168 ms          QRS Dur : 066 ms      QT Int : 338 ms       P-R-T Axes : 084 078 082 degrees     QTc Int : 463 ms    Sinus tachycardia  Biatrial enlargement  Septal infarct ,age undetermined  Abnormal ECG  When compared with ECG of 28-JAN-2023 04:31,  ST now depressed in Inferior leads  Confirmed by Sol Kenny MD ([id number]) on 3/6/2023 9:10:13 AM    Referred By: [email or web address]   SELF           Confirmed By:Sol Kenny MD                                  Imaging Results    None          Medications   albuterol-ipratropium 2.5 mg-0.5 mg/3 mL nebulizer solution 3 mL (3 mLs Nebulization Given 3/6/23 0835)     Medical Decision Making:   History:   Old Medical Records: I decided to obtain old medical records.  Initial Assessment:   62-year-old male with chronic medical problems on home O2 presents with shortness of breath after his home oxygen tank ran out and is compressor no longer works.  No fever no cough no chest pain. Symptoms most likely related to lack of supplemental oxygen. Pt does have some mild wheezing but states he always has a little wheezing. Routine blood work ordered to eval for infection, electrolyte abnlty. No clinical concern for chf ex or volume overload.  Clinical Tests:   Lab Tests: Ordered and Reviewed  ED Management:  Pt signed out to on-coming staff at shift change. Waiting for social work for assistance with home health equipmentr.                        Clinical Impression:   Final diagnoses:  [R00.0]  Tachycardia  [R06.02] SOB (shortness of breath) (Primary)               Niki Vega MD  03/06/23 [id number]

## 2023-03-06 NOTE — PROGRESS NOTES
6MWT    1) Patients Home O2 sat on room air, while at rest: 94    If O2 sats on room air at rest are 88% or below patient qualifies.  No additional testing needed.  Document N/A in step 2 and 3.  If 89% or above, complete steps 2.      2) Patient's O2 Sat on room air, while exercisin    If O2 sats on room air while exercising remain 89% or above patient does not qualify, no further testing needed.  Document N/A in step 3.  If O2 sats on room air while exercising are 88% or below continue to step 3.       3) Patient's O2 Sat while exercising on O2: 94 at 3 LPM    If O2 sats improve on oxygen patient qualifies for portable oxygen.

## 2023-03-06 NOTE — PROGRESS NOTES
"  6MW Test  Ordering Provider: Dr. Yang  Performing nurse/tech/RT: tracy Noriega  Diagnosis: Shortness of Breath  Height: 5' 6" (167.6 cm)  Weight: 60.3 kg (132 lb 15 oz)  BMI (Calculated): 21.5  Predicted Distance Meters (Calculated): 552.4 meters  Patient Race:   6MWT Status: not completed  Patient Reported: Dyspnea  Was O2 used?: No  6MW Distance walked (feet): 60 feet  Distance walked (meters): 18.29 meters  Did patient stop?: Yes  How many times?: 1  Stop Time 1: 1040 seconds  Restart Time 1: 1040 seconds  Did patient restart?: Yes  Type of assistive device(s) used?: no assistive devices  Pre-Exercise  Oxygen Saturation: 94 %  Supplemental Oxygen: Room Air  Heart Rate: 104 bpm  Valerie Dyspnea Rating : 2  Post Exercise  Oxygen Saturation: 82 %  Supplemental Oxygen: Room Air  Heart Rate: 115 bpm  Valerie Dyspnea Rating : 4  Recovery  Recovery Time (seconds): 60 seconds  Oxygen Saturation: 94 %  Supplemental Oxygen: 3 L/M  Heart Rate: 95 bpm  Valerie Dyspnea Rating : 2  Interpretation  Is procedure ready for interpretation?: Yes  Predicted Distance Meters (Calculated): 552.4 meters  Oxygen Qualification  Oxygen Qualification?: Yes   "

## 2023-03-06 NOTE — ED TRIAGE NOTES
Baltazar Mccray, a 62 y.o. male presents to the ED w/ complaint of shortness of breath. He ran out of oxygen overnight and then started feeling short of breath, EMS found him to be 75% on RA. On 2.5L SpO2 95% and patient is much more comfortable. Denies fever, chest pain, nausea, vomiting, other symptoms. Reports he can't get his tank filled til later this morning.    Triage note:  Chief Complaint   Patient presents with    Shortness of Breath     Hx of copd. Ran out of oxygen at home tonight. Pt was 82% on room air     Review of patient's allergies indicates:   Allergen Reactions    Benazepril Swelling     Past Medical History:   Diagnosis Date    Aspiration pneumonia 5/30/2021    Asthma     Atrial fibrillation with rapid ventricular response     CHF (congestive heart failure)     COPD (chronic obstructive pulmonary disease)     home O2 at night only    Coronary artery disease     Hypertension     Lung nodule     Pneumonia     Seizures

## 2023-03-06 NOTE — PROVIDER PROGRESS NOTES - EMERGENCY DEPT.
Encounter Date: 3/6/2023    ED Physician Progress Notes        Physician Note:   I received sign-out at 7 am regarding this patient  -Patient presented to the ED for running out of O2 tank    Labs Reviewed  CBC W/ AUTO DIFFERENTIAL - Abnormal; Notable for the following components:     MCH                           24.9 (*)               MCHC                          30.5 (*)               RDW                           17.9 (*)            All other components within normal limits  COMPREHENSIVE METABOLIC PANEL - Abnormal; Notable for the following components:     Sodium                        131 (*)                Chloride                      89 (*)                 CO2                           30 (*)                 Glucose                       55 (*)                 Albumin                       3.3 (*)                AST                           76 (*)                 ALT                           84 (*)              All other components within normal limits  No orders to display  Medications - No data to display    -At the time of sign-out the following tests were pending: none  -Plan is for ED SW evaluation in am to help pt obtain O2 tank    -SW evaluated pt - pt apparently has increasing O2 needs.  New 6 min walk test performed and pt qualifies for 3L NC.  New order placed and pt provided with equipment

## 2023-03-06 NOTE — ED NOTES
Pt complaining of SOB despite oxygen use. Pt requesting to be on his usual 4L. Pt adjusted and repositioned in bed as well. Pt satting 97%.

## 2023-03-06 NOTE — PLAN OF CARE
SW visited with pt to determine O2 needs prior to d/c home.      Pt ran out of O2 and his concentrator is broken.  SW to follow-up with [facility] to ensure concentrator will be fixed and he will have O2 to d/c with      Giulia Castillo CD, MSW, LMSW, RSW   Case Management  [facility]  Email: [email or web address]

## 2023-03-06 NOTE — PLAN OF CARE
BILL contacted [facility] [phone] to ensure pt would get his concentrator serviced and O2 to his home.      Jelly confirmed that  was on the way to pt's home      Giulia Castillo CD, MSW, LMSW, RSW   Case Management  [facility]  Email: [email or web address]

## 2023-03-10 ENCOUNTER — OFFICE VISIT (OUTPATIENT)
Dept: PRIMARY CARE CLINIC | Facility: CLINIC | Age: 63
End: 2023-03-10
Payer: MEDICARE

## 2023-03-10 VITALS
HEART RATE: 98 BPM | HEIGHT: 66 IN | WEIGHT: 131.5 LBS | TEMPERATURE: 98 F | SYSTOLIC BLOOD PRESSURE: 134 MMHG | BODY MASS INDEX: 21.13 KG/M2 | OXYGEN SATURATION: 95 % | DIASTOLIC BLOOD PRESSURE: 84 MMHG

## 2023-03-10 DIAGNOSIS — J44.9 PULMONARY CACHEXIA DUE TO CHRONIC OBSTRUCTIVE PULMONARY DISEASE: ICD-10-CM

## 2023-03-10 DIAGNOSIS — R64 PULMONARY CACHEXIA DUE TO CHRONIC OBSTRUCTIVE PULMONARY DISEASE: ICD-10-CM

## 2023-03-10 DIAGNOSIS — R10.13 EPIGASTRIC PAIN: ICD-10-CM

## 2023-03-10 PROCEDURE — 4010F ACE/ARB THERAPY RXD/TAKEN: CPT | Mod: CPTII,S$GLB,, | Performed by: INTERNAL MEDICINE

## 2023-03-10 PROCEDURE — 3075F SYST BP GE 130 - 139MM HG: CPT | Mod: CPTII,S$GLB,, | Performed by: INTERNAL MEDICINE

## 2023-03-10 PROCEDURE — 3079F DIAST BP 80-89 MM HG: CPT | Mod: CPTII,S$GLB,, | Performed by: INTERNAL MEDICINE

## 2023-03-10 PROCEDURE — 3079F PR MOST RECENT DIASTOLIC BLOOD PRESSURE 80-89 MM HG: ICD-10-PCS | Mod: CPTII,S$GLB,, | Performed by: INTERNAL MEDICINE

## 2023-03-10 PROCEDURE — 99211 OFF/OP EST MAY X REQ PHY/QHP: CPT | Mod: S$GLB,,, | Performed by: INTERNAL MEDICINE

## 2023-03-10 PROCEDURE — 3008F BODY MASS INDEX DOCD: CPT | Mod: CPTII,S$GLB,, | Performed by: INTERNAL MEDICINE

## 2023-03-10 PROCEDURE — 1159F MED LIST DOCD IN RCRD: CPT | Mod: CPTII,S$GLB,, | Performed by: INTERNAL MEDICINE

## 2023-03-10 PROCEDURE — 99211 PR OFFICE/OUTPT VISIT, EST, LEVL I: ICD-10-PCS | Mod: S$GLB,,, | Performed by: INTERNAL MEDICINE

## 2023-03-10 PROCEDURE — 1159F PR MEDICATION LIST DOCUMENTED IN MEDICAL RECORD: ICD-10-PCS | Mod: CPTII,S$GLB,, | Performed by: INTERNAL MEDICINE

## 2023-03-10 PROCEDURE — 3008F PR BODY MASS INDEX (BMI) DOCUMENTED: ICD-10-PCS | Mod: CPTII,S$GLB,, | Performed by: INTERNAL MEDICINE

## 2023-03-10 PROCEDURE — 4010F PR ACE/ARB THEARPY RXD/TAKEN: ICD-10-PCS | Mod: CPTII,S$GLB,, | Performed by: INTERNAL MEDICINE

## 2023-03-10 PROCEDURE — 3075F PR MOST RECENT SYSTOLIC BLOOD PRESS GE 130-139MM HG: ICD-10-PCS | Mod: CPTII,S$GLB,, | Performed by: INTERNAL MEDICINE

## 2023-03-10 RX ORDER — ACETAMINOPHEN 500 MG
1000 TABLET ORAL 2 TIMES DAILY
Qty: 180 TABLET | Refills: 3 | Status: SHIPPED | OUTPATIENT
Start: 2023-03-10

## 2023-03-10 NOTE — PROGRESS NOTES
Adherence packed using Dispill for 7 days:   (Compressed monthly)       Morning Pocket:   Acetaminophen 500 mg (2 tablets) - newly added  Diltiazam 90 mg (2 tablets)   Eliquis 5 mg   Escitalopram 10mg  Furosemide 20 mg   Ferrous Sulfate 325mg   Pantoprazole 40 mg   Prednisone 10 mg  Varenicline 1 mg     Evening Pocket:   Acetaminophen 500 mg (2 tablets) - newly added  Atorvastatin 40 mg   Eliquis 5 mg   Folic Acid 1mg   MVI 50+   Thiamine 100 mg   Varenicline 1 mg

## 2023-03-10 NOTE — PROGRESS NOTES
"[facility]  SHARED NOTE: Hien Calloway (NP Student), Dr Orona (Attending)    Subjective:      Patient ID: Baltazar Mccray is a 62 y.o. male with  COPD, Afib, HTN, HLD, low health literacy, tobacco abuse, and frequent hospitalizations.       Chief Complaint: Follow-up    Prior to this visit, patient's last encounter with PCP was 3/1/2023.    COPD: End stage COPD, oxygen dependent, continues to smoke cigars. Not interested in smoking cessation clinic and states "I'm not ready for that yet." Chantix loaded in pill packs. Still using Albuterol inhaler 2 puffs as needed. He does not use Spirva or pulmicort. Uses BiPAP nightly. Just received new oxygen concentrator and is pleased that he is able to preserve his portable O2 tanks.   Afib: Denies palpitations, CP.   DECLINED BREATHING TREATMENT IN CLINIC TODAY.   Dental Caries: Was able to see dentist after last appt 3/1/23. He received xrays expressed to dentist that he wants all of his remaining teeth extracted. He received no prescriptions and no follow up appointment.     Other: Nausea without vomiting for several months. No meds, abd pain.     Pill Packs:  last received 2/27/23  Adherence packed using Dispill for 7 days:   (Compressed monthly)       Morning Pocket:   Diltiazam 90 mg (2 tablets)   Eliquis 5 mg   Escitalopram 10mg  Furosemide 20 mg   Ferrous Sulfate 325mg   Pantoprazole 40 mg   Prednisone 10 mg  Varenicline 1 mg  Tylenol 1000mg-> ADD     Evening Pocket:   Atorvastatin 40 mg   Eliquis 5 mg   Folic Acid 1mg   MVI 50+   Thiamine 100 mg   Varenicline 1 mg  Tylenol 1000mg-> ADD    Past Surgical History:   Procedure Laterality Date    ESOPHAGOGASTRODUODENOSCOPY N/A 2/11/2022    Procedure: EGD (ESOPHAGOGASTRODUODENOSCOPY);  Surgeon: Cain Ortega MD;  Location: [address];  Service: Endoscopy;  Laterality: N/A;    ESOPHAGOGASTRODUODENOSCOPY N/A 9/15/2022    Procedure: EGD (ESOPHAGOGASTRODUODENOSCOPY);  " Surgeon: Leonid Chavez MD;  Location: [facility] ENDO (2ND FLR);  Service: Endoscopy;  Laterality: N/A;  PA-50 - 2nd floor  vacc-wears 2L o2-inst mail and verbal-clears 4 hrs prior-tb  8/30 pt rescheduled; updated instructions mailed-st    LEFT HEART CATHETERIZATION  4/23/2020    Procedure: Left heart cath;  Surgeon: Nic Pollack MD;  Location: [facility] CATH LAB;  Service: Cardiology;;       Past Medical History:   Diagnosis Date    Aspiration pneumonia 5/30/2021    Asthma     Atrial fibrillation with rapid ventricular response     CHF (congestive heart failure)     COPD (chronic obstructive pulmonary disease)     home O2 at night only    Coronary artery disease     Hypertension     Lung nodule     Pneumonia     Seizures        Social History     Socioeconomic History    Marital status: Single   Tobacco Use    Smoking status: Some Days     Packs/day: 0.25     Types: Cigarettes     Passive exposure: Past    Smokeless tobacco: Never    Tobacco comments:     1-2 cigarettes per day     1/31 Patient quick smoking 1 month ago   Substance and Sexual Activity    Alcohol use: Yes     Alcohol/week: 2.0 standard drinks     Types: 2 Cans of beer per week     Comment: every night    Drug use: No    Sexual activity: Not Currently   Social History Narrative    Patient' nephew is currently living with him in his apartment. Patient's sister Viry ([phone]) helps manage patient's care.            6/3/21    Patient is no longer staying with family. Patient is currently staying at the [facility] and working on getting into their program for more supportive housing.      Social Determinants of Health     Financial Resource Strain: Low Risk     Difficulty of Paying Living Expenses: Not hard at all   Food Insecurity: Unknown    Worried About Running Out of Food in the Last Year: Patient refused    Ran Out of Food in the Last Year: Patient refused   Transportation Needs: No Transportation Needs    Lack of Transportation (Medical): No  "   Lack of Transportation (Non-Medical): No   Physical Activity: Unknown    Days of Exercise per Week: Patient refused    Minutes of Exercise per Session: Patient refused   Stress: Stress Concern Present    Feeling of Stress : Rather much   Social Connections: Socially Isolated    Frequency of Communication with Friends and Family: More than three times a week    Frequency of Social Gatherings with Friends and Family: Twice a week    Attends Denominational Services: Never    Active Member of Clubs or Organizations: No    Attends Club or Organization Meetings: Patient refused    Marital Status: Never    Housing Stability: Low Risk     Unable to Pay for Housing in the Last Year: No    Number of Places Lived in the Last Year: 2    Unstable Housing in the Last Year: No       Review of Systems   Constitutional:  Negative for activity change.   Respiratory:  Positive for shortness of breath (with exertion). Negative for chest tightness.    Gastrointestinal:  Positive for nausea. Negative for vomiting.   Musculoskeletal:  Negative for arthralgias.   Psychiatric/Behavioral: Negative.       Objective:   /84 (BP Location: Left arm, Patient Position: Sitting, BP Method: Medium (Manual))   Pulse 98   Temp 98.3 °F (36.8 °C) (Oral)   Ht 5' 6" (1.676 m)   Wt 59.6 kg (131 lb 8.1 oz)   SpO2 95%   BMI 21.23 kg/m²     Physical Exam  HENT:      Mouth/Throat:      Mouth: Mucous membranes are moist.   Cardiovascular:      Rate and Rhythm: Regular rhythm. Tachycardia present.   Pulmonary:      Effort: Tachypnea present.      Breath sounds: Examination of the right-upper field reveals wheezing. Examination of the left-upper field reveals wheezing. Examination of the right-lower field reveals rales. Examination of the left-lower field reveals rales. Wheezing and rales present.   Skin:     General: Skin is warm.   Neurological:      Mental Status: He is alert. Mental status is at baseline.           Lab Results   Component " Value Date    WBC 7.98 03/06/2023    HGB 14.1 03/06/2023    HCT 46.3 03/06/2023     03/06/2023    CHOL 260 (H) 04/19/2020    TRIG 145 04/19/2020    HDL 91 (H) 04/19/2020    ALT 84 (H) 03/06/2023    AST 76 (H) 03/06/2023     (L) 03/06/2023    K 4.1 03/06/2023    CL 89 (L) 03/06/2023    CREATININE 0.9 03/06/2023    BUN 15 03/06/2023    CO2 30 (H) 03/06/2023    TSH 1.137 12/05/2022    INR 1.1 12/08/2022    HGBA1C 5.7 (H) 02/10/2022       Current Outpatient Medications on File Prior to Visit   Medication Sig Dispense Refill    albuterol (PROVENTIL/VENTOLIN HFA) 90 mcg/actuation inhaler Inhale 1-2 puffs into the lungs every 6 (six) hours as needed for Wheezing. Rescue 8.5 g 2    albuterol-ipratropium (DUO-NEB) 2.5 mg-0.5 mg/3 mL nebulizer solution Use 1 vial (3 mLs) by nebulization every 4 (four) hours as needed for Wheezing or Shortness of Breath. Rescue 180 mL 2    apixaban (ELIQUIS) 5 mg Tab Take 1 tablet (5 mg total) by mouth 2 (two) times daily. 180 tablet 3    arformoteroL (BROVANA) 15 mcg/2 mL Nebu Take 2 mLs (15 mcg total) by nebulization 2 (two) times a day. Controller 360 mL 3    atorvastatin (LIPITOR) 40 MG tablet Take 1 tablet (40 mg total) by mouth once daily. 90 tablet 3    bismuth subsalicylate (PEPTO BISMOL) 262 mg/15 mL suspension Take 30 mLs by mouth daily as needed for Indigestion.      budesonide (PULMICORT) 0.5 mg/2 mL nebulizer solution Take 2 mLs (0.5 mg total) by nebulization 2 (two) times a day. Controller 1440 mL 3    calcium carbonate (TUMS ORAL) Take 2 tablets by mouth daily as needed (Heartburn).      diltiaZEM (CARDIZEM) 90 MG tablet Take 2 tablets (180 mg total) by mouth once daily. 180 tablet 3    EScitalopram oxalate (LEXAPRO) 10 MG tablet Take 1 tablet (10 mg total) by mouth once daily. 90 tablet 3    ferrous sulfate 325 (65 FE) MG EC tablet Take 1 tablet (325 mg total) by mouth once daily. 30 tablet 1    folic acid (FOLVITE) 1 MG tablet Take 1 tablet (1 mg total) by mouth  once daily. 30 tablet 2    formoterol (PERFOROMIST) 20 mcg/2 mL Nebu Take 2 mLs (20 mcg total) by nebulization 2 (two) times a day. Controller 360 mL 3    furosemide (LASIX) 20 MG tablet Take 1 tablet (20 mg total) by mouth once daily. 90 tablet 3    inhalat.spacing dev,large mask (COMPACT SPACE CHAMBER-LRG MASK) Spcr Use as directed 1 each 0    losartan (COZAAR) 25 MG tablet Take 1 tablet (25 mg total) by mouth once daily. 60 tablet 0    multivit-min-FA-lycopen-lutein (CERTAVITE SENIOR) 0.4 mg-300 mcg- 250 mcg Tab Take 1 tablet by mouth once daily. 30 tablet 2    nicotine (NICODERM CQ) 21 mg/24 hr Place 1 patch onto the skin once daily. 28 patch 11    pantoprazole (PROTONIX) 40 MG tablet Take 1 tablet (40 mg total) by mouth once daily. 30 tablet 11    predniSONE (DELTASONE) 10 MG tablet Take 1 tablet (10 mg total) by mouth once daily. 30 tablet 3    thiamine 100 MG tablet Take 1 tablet (100 mg total) by mouth once daily. 30 tablet 2    tiotropium bromide (SPIRIVA RESPIMAT) 2.5 mcg/actuation inhaler Inhale 2 puffs into the lungs Daily. Controller 4 g 0    varenicline (CHANTIX STARTING MONTH BOX) 0.5 mg (11)- 1 mg (42) tablet Take one 0.5mg tab by mouth once daily X3 days,then increase to one 0.5mg tab twice daily X4 days,then increase to one 1mg tab twice daily 53 each 0    [DISCONTINUED] acetaminophen (TYLENOL) 500 MG tablet Take 1 tablet (500 mg total) by mouth every 6 (six) hours as needed for Pain. 90 tablet 0    LORazepam (ATIVAN) 0.5 MG tablet Take 1 tablet (0.5 mg total) by mouth every 6 (six) hours as needed (anxiety, air hunger). 15 tablet 0     No current facility-administered medications on file prior to visit.       Assessment:   62 y.o. male with multiple co-morbid illnesses here to follow-up with PCP and continue work-up of chronic issues    Plan:     Problem List Items Addressed This Visit          Pulmonary    Pulmonary cachexia due to chronic obstructive pulmonary disease     Hypoglycemia,  hypoalbuminemia, low muscle mass, difficulty ambulating (slow gait speed), fatigue. BMI 19-20, weight variable due to collection method discrepancies. Numerous chronic conditions causing this multifactoral decline: COPD, h/o alcohol abuse, hypermetabolism with work of breath  Poor prognosis  High mortality associated with this phenomenan  Monitor weights and treat COPD  Strongly advised to avoid smoking  Continues to smoke cigars  Had intro meeting with hospice company and he elected to postpone enrollment  Will continue to discuss readiness            GI    Epigastric pain    Relevant Medications    acetaminophen (TYLENOL) 500 MG tablet       Health Maintenance         Date Due Completion Date    TETANUS VACCINE Never done ---    Colorectal Cancer Screening Never done ---    Shingles Vaccine (2 of 2) 03/15/2023 1/18/2023    High Dose Statin 03/10/2024 3/10/2023    Lipid Panel 04/19/2025 4/19/2020    Pneumococcal Vaccines (Age 0-64) (3 - PPSV23 if available, else PCV20) 12/19/2027 12/19/2022            Future Appointments   Date Time Provider Department Center   3/10/2023  3:00 PM Delphine Orona MD [facility] MED CLN [address] [facility]   3/14/2023  8:00 AM Delphine Orona MD [facility] MED CLN [address] [facility]   3/15/2023 10:00 AM Raina Moore MD [facility] PAL MED [address]   3/15/2023 12:00 PM Delphine Orona MD [facility] MED CLN [address] [facility]   3/22/2023  8:00 AM Susan Rae, NP [address]   4/17/2023  9:00 AM NURSE, [facility] MED CLN [address] [facility]   4/24/2023  1:30 PM Hung Malave MD [facility]   6/6/2023  8:00 AM Mary Jo Rueda, [place] [facility] OPTOMTY [address]         Follow up in about 1 week (around 3/17/2023). Total clinical care time was 15min    Hien Calloway (NP Student)    Delphine Orona MD/MPH  [facility]  [phone] [facility]

## 2023-03-10 NOTE — ASSESSMENT & PLAN NOTE
Hypoglycemia, hypoalbuminemia, low muscle mass, difficulty ambulating (slow gait speed), fatigue. BMI 19-20, weight variable due to collection method discrepancies. Numerous chronic conditions causing this multifactoral decline: COPD, h/o alcohol abuse, hypermetabolism with work of breath  · Poor prognosis  · High mortality associated with this phenomenan  · Monitor weights and treat COPD  · Strongly advised to avoid smoking  · Continues to smoke cigars  · Had intro meeting with hospice company and he elected to postpone enrollment  · Will continue to discuss readiness

## 2023-03-11 ENCOUNTER — EXTERNAL HOME HEALTH (OUTPATIENT)
Dept: HOME HEALTH SERVICES | Facility: HOSPITAL | Age: 63
End: 2023-03-11
Payer: MEDICARE

## 2023-03-14 ENCOUNTER — TELEPHONE (OUTPATIENT)
Dept: PALLIATIVE MEDICINE | Facility: CLINIC | Age: 63
End: 2023-03-14
Payer: MEDICARE

## 2023-03-15 ENCOUNTER — OFFICE VISIT (OUTPATIENT)
Dept: PALLIATIVE MEDICINE | Facility: CLINIC | Age: 63
End: 2023-03-15
Payer: MEDICARE

## 2023-03-15 ENCOUNTER — OFFICE VISIT (OUTPATIENT)
Dept: PRIMARY CARE CLINIC | Facility: CLINIC | Age: 63
End: 2023-03-15
Payer: MEDICARE

## 2023-03-15 VITALS
HEIGHT: 66 IN | HEART RATE: 76 BPM | DIASTOLIC BLOOD PRESSURE: 86 MMHG | SYSTOLIC BLOOD PRESSURE: 167 MMHG | OXYGEN SATURATION: 100 % | WEIGHT: 129.19 LBS | BODY MASS INDEX: 20.76 KG/M2

## 2023-03-15 VITALS
WEIGHT: 129.06 LBS | DIASTOLIC BLOOD PRESSURE: 95 MMHG | BODY MASS INDEX: 20.74 KG/M2 | HEIGHT: 66 IN | HEART RATE: 80 BPM | SYSTOLIC BLOOD PRESSURE: 183 MMHG | OXYGEN SATURATION: 100 % | RESPIRATION RATE: 16 BRPM

## 2023-03-15 DIAGNOSIS — Z51.5 PALLIATIVE CARE ENCOUNTER: ICD-10-CM

## 2023-03-15 DIAGNOSIS — F17.210 CIGARETTE NICOTINE DEPENDENCE WITHOUT COMPLICATION: ICD-10-CM

## 2023-03-15 DIAGNOSIS — J42 CHRONIC BRONCHITIS, UNSPECIFIED CHRONIC BRONCHITIS TYPE: Chronic | ICD-10-CM

## 2023-03-15 DIAGNOSIS — I25.118 CORONARY ARTERY DISEASE OF NATIVE ARTERY OF NATIVE HEART WITH STABLE ANGINA PECTORIS: Primary | ICD-10-CM

## 2023-03-15 DIAGNOSIS — I50.30 HEART FAILURE WITH PRESERVED EJECTION FRACTION, UNSPECIFIED HF CHRONICITY: ICD-10-CM

## 2023-03-15 DIAGNOSIS — I10 ESSENTIAL HYPERTENSION: Primary | Chronic | ICD-10-CM

## 2023-03-15 DIAGNOSIS — J44.9 CHRONIC OBSTRUCTIVE PULMONARY DISEASE, UNSPECIFIED COPD TYPE: ICD-10-CM

## 2023-03-15 DIAGNOSIS — R11.0 NAUSEA: ICD-10-CM

## 2023-03-15 DIAGNOSIS — I48.91 ATRIAL FIBRILLATION, UNSPECIFIED TYPE: ICD-10-CM

## 2023-03-15 DIAGNOSIS — F10.10 ALCOHOL ABUSE: ICD-10-CM

## 2023-03-15 DIAGNOSIS — I50.32 CHRONIC DIASTOLIC CONGESTIVE HEART FAILURE: ICD-10-CM

## 2023-03-15 DIAGNOSIS — R63.0 ANOREXIA: ICD-10-CM

## 2023-03-15 PROCEDURE — 99211 OFF/OP EST MAY X REQ PHY/QHP: CPT | Mod: S$GLB,,, | Performed by: INTERNAL MEDICINE

## 2023-03-15 PROCEDURE — 1159F MED LIST DOCD IN RCRD: CPT | Mod: CPTII,S$GLB,, | Performed by: STUDENT IN AN ORGANIZED HEALTH CARE EDUCATION/TRAINING PROGRAM

## 2023-03-15 PROCEDURE — 3008F BODY MASS INDEX DOCD: CPT | Mod: CPTII,S$GLB,, | Performed by: INTERNAL MEDICINE

## 2023-03-15 PROCEDURE — 3079F PR MOST RECENT DIASTOLIC BLOOD PRESSURE 80-89 MM HG: ICD-10-PCS | Mod: CPTII,S$GLB,, | Performed by: STUDENT IN AN ORGANIZED HEALTH CARE EDUCATION/TRAINING PROGRAM

## 2023-03-15 PROCEDURE — 4010F PR ACE/ARB THEARPY RXD/TAKEN: ICD-10-PCS | Mod: CPTII,S$GLB,, | Performed by: INTERNAL MEDICINE

## 2023-03-15 PROCEDURE — 99999 PR PBB SHADOW E&M-EST. PATIENT-LVL IV: ICD-10-PCS | Mod: PBBFAC,,, | Performed by: STUDENT IN AN ORGANIZED HEALTH CARE EDUCATION/TRAINING PROGRAM

## 2023-03-15 PROCEDURE — 3077F SYST BP >= 140 MM HG: CPT | Mod: CPTII,S$GLB,, | Performed by: INTERNAL MEDICINE

## 2023-03-15 PROCEDURE — 3008F PR BODY MASS INDEX (BMI) DOCUMENTED: ICD-10-PCS | Mod: CPTII,S$GLB,, | Performed by: STUDENT IN AN ORGANIZED HEALTH CARE EDUCATION/TRAINING PROGRAM

## 2023-03-15 PROCEDURE — 4010F ACE/ARB THERAPY RXD/TAKEN: CPT | Mod: CPTII,S$GLB,, | Performed by: STUDENT IN AN ORGANIZED HEALTH CARE EDUCATION/TRAINING PROGRAM

## 2023-03-15 PROCEDURE — 4010F ACE/ARB THERAPY RXD/TAKEN: CPT | Mod: CPTII,S$GLB,, | Performed by: INTERNAL MEDICINE

## 2023-03-15 PROCEDURE — 3077F SYST BP >= 140 MM HG: CPT | Mod: CPTII,S$GLB,, | Performed by: STUDENT IN AN ORGANIZED HEALTH CARE EDUCATION/TRAINING PROGRAM

## 2023-03-15 PROCEDURE — 3077F PR MOST RECENT SYSTOLIC BLOOD PRESSURE >= 140 MM HG: ICD-10-PCS | Mod: CPTII,S$GLB,, | Performed by: STUDENT IN AN ORGANIZED HEALTH CARE EDUCATION/TRAINING PROGRAM

## 2023-03-15 PROCEDURE — 1159F PR MEDICATION LIST DOCUMENTED IN MEDICAL RECORD: ICD-10-PCS | Mod: CPTII,S$GLB,, | Performed by: STUDENT IN AN ORGANIZED HEALTH CARE EDUCATION/TRAINING PROGRAM

## 2023-03-15 PROCEDURE — 3008F PR BODY MASS INDEX (BMI) DOCUMENTED: ICD-10-PCS | Mod: CPTII,S$GLB,, | Performed by: INTERNAL MEDICINE

## 2023-03-15 PROCEDURE — 99497 PR ADVNCD CARE PLAN 30 MIN: ICD-10-PCS | Mod: S$GLB,,, | Performed by: STUDENT IN AN ORGANIZED HEALTH CARE EDUCATION/TRAINING PROGRAM

## 2023-03-15 PROCEDURE — 3077F PR MOST RECENT SYSTOLIC BLOOD PRESSURE >= 140 MM HG: ICD-10-PCS | Mod: CPTII,S$GLB,, | Performed by: INTERNAL MEDICINE

## 2023-03-15 PROCEDURE — 99215 OFFICE O/P EST HI 40 MIN: CPT | Mod: S$GLB,,, | Performed by: STUDENT IN AN ORGANIZED HEALTH CARE EDUCATION/TRAINING PROGRAM

## 2023-03-15 PROCEDURE — 99215 PR OFFICE/OUTPT VISIT, EST, LEVL V, 40-54 MIN: ICD-10-PCS | Mod: S$GLB,,, | Performed by: STUDENT IN AN ORGANIZED HEALTH CARE EDUCATION/TRAINING PROGRAM

## 2023-03-15 PROCEDURE — 4010F PR ACE/ARB THEARPY RXD/TAKEN: ICD-10-PCS | Mod: CPTII,S$GLB,, | Performed by: STUDENT IN AN ORGANIZED HEALTH CARE EDUCATION/TRAINING PROGRAM

## 2023-03-15 PROCEDURE — 99497 ADVNCD CARE PLAN 30 MIN: CPT | Mod: S$GLB,,, | Performed by: STUDENT IN AN ORGANIZED HEALTH CARE EDUCATION/TRAINING PROGRAM

## 2023-03-15 PROCEDURE — 99211 PR OFFICE/OUTPT VISIT, EST, LEVL I: ICD-10-PCS | Mod: S$GLB,,, | Performed by: INTERNAL MEDICINE

## 2023-03-15 PROCEDURE — 3008F BODY MASS INDEX DOCD: CPT | Mod: CPTII,S$GLB,, | Performed by: STUDENT IN AN ORGANIZED HEALTH CARE EDUCATION/TRAINING PROGRAM

## 2023-03-15 PROCEDURE — 3080F PR MOST RECENT DIASTOLIC BLOOD PRESSURE >= 90 MM HG: ICD-10-PCS | Mod: CPTII,S$GLB,, | Performed by: INTERNAL MEDICINE

## 2023-03-15 PROCEDURE — 3080F DIAST BP >= 90 MM HG: CPT | Mod: CPTII,S$GLB,, | Performed by: INTERNAL MEDICINE

## 2023-03-15 PROCEDURE — 3079F DIAST BP 80-89 MM HG: CPT | Mod: CPTII,S$GLB,, | Performed by: STUDENT IN AN ORGANIZED HEALTH CARE EDUCATION/TRAINING PROGRAM

## 2023-03-15 PROCEDURE — 99999 PR PBB SHADOW E&M-EST. PATIENT-LVL IV: CPT | Mod: PBBFAC,,, | Performed by: STUDENT IN AN ORGANIZED HEALTH CARE EDUCATION/TRAINING PROGRAM

## 2023-03-15 RX ORDER — ONDANSETRON 4 MG/1
4 TABLET, ORALLY DISINTEGRATING ORAL 2 TIMES DAILY
Qty: 40 TABLET | Refills: 1 | Status: SHIPPED | OUTPATIENT
Start: 2023-03-15

## 2023-03-15 NOTE — ASSESSMENT & PLAN NOTE
Low EF of 53%, has diastolic HF on echo. Taking lasix chronically  · Monitor  · Lasix in pill packs

## 2023-03-15 NOTE — PATIENT INSTRUCTIONS
TODAY:  - keep up the great work!  - take zofran for nausea  - keep taking your pill packs  - next we'll get your second dose of the shingrix vaccine

## 2023-03-15 NOTE — ASSESSMENT & PLAN NOTE
COPD on 2L, Atrial Fibrillation, HFpEF (EF 53%) with AF RVR on admission with rates up to 170bpm.    On eliquis   Diltiazem   Treat COPD

## 2023-03-15 NOTE — ASSESSMENT & PLAN NOTE
Poor compliance with inhalers, does not have the dexterity or strength to use hand-held inhalers  · Continue inhalers in neb machine and powder delivered controller inhalers

## 2023-03-15 NOTE — PROGRESS NOTES
Adherence packed using Dispill for 7 days:   (Compressed monthly)       Morning Pocket:   Acetaminophen 500 mg (2 tablets)   Diltiazam 90 mg (2 tablets)   Eliquis 5 mg   Escitalopram 10mg  Furosemide 20 mg   Ferrous Sulfate 325mg   Pantoprazole 40 mg   Prednisone 10 mg  Varenicline 1 mg     Evening Pocket:   Acetaminophen 500 mg (2 tablets)   Atorvastatin 40 mg   Eliquis 5 mg   Folic Acid 1mg   MVI 50+   Thiamine 100 mg   Varenicline 1 mg        *Sent to home address via same-day delivery.

## 2023-03-15 NOTE — ASSESSMENT & PLAN NOTE
BP uncontrolled today, did not take AM meds   · Stressed the importance of him bringing ALL his medications and supplies to the next meeting  · Will take meds upon return home

## 2023-03-15 NOTE — PROGRESS NOTES
Palliative Medicine Clinic Note      Consult Requested By: Dr. Carlo Pasacl    Primary Care Physician:   Delphine Orona MD    Reason for Consult: Advance care planning and symptom management in the setting of Advanced COPD       ASSESSMENT/PLAN:     Plan/Recommendations:  Diagnoses and all orders for this visit:        Chronic obstructive pulmonary disease, unspecified COPD type  -     Ambulatory referral/consult to CLINIC Palliative Care  -Multiple hospitalizations  -On 3L o2, now has concentrator  -Homebound because of O2 requirements  -On duoneb, pulmicort, and brovana   -On spiriva   -Has BIPAP at home  -Discussed balancing rest with activity and pacing himself  -on Lexapro for anxiety   -Discussed pulmonary rehabilitation - he wants to wait   -discussed the imminent need for increased care at home - he shared that his brother will be moving in to help him      Coronary artery disease of native artery of native heart with stable angina pectoris  -On Cardizem, eliquis, cozaar and lasix       Nausea  -     ondansetron (ZOFRAN-ODT) 4 MG TbDL; Take 1 tablet (4 mg total) by mouth 2 (two) times daily.    Anorexia  -will control nausea first and reassess anorexia       Palliative care encounter      Medicolegal: Has decision making capacity.  Sister  Gladis Mccray is HCPOA.     Psychosocial:  support system consists of 2 sister and 1 brother     Spiritual: Evangelical      Understanding of disease and Illness Trajectory: Patient  has  good understanding of his illness, they can benefit from continued education on what to expect in the future.      Goals of care:    Advance Care Planning     Date: 03/15/2023    I engaged the patient in a conversation about advance care planning.    He confirmed his choices for HCPOA, sister Gladis Mccray, and Viry Mccray and brother Alexander Marroquin.      We did specifically address the patient's likely prognosis, because that he is frail and that he will continue to  decline and that if he were to get very sick his outcome would not be good.    We explored the patient's values and preferences for future care.  The patient endorses that what is most important right now is to focus on spending time at home, remaining as independent as possible, and life prolongation    He shared that his main goal is to live longer and hopefully get better this for him means breathing better.  He is worried about being very short breath and gasping for air.    He shared that he would be okay with resuscitation and being placed on life support, however he would not want to be like that for a prolonged period of time.  When I asked long would be an acceptable time said he has never thought about that.  I shared that it would be important for him to think about this and discuss this with his siblings as they would be the ones making decisions for him if he were to be in that state.     Accordingly, we have decided that the best plan to meet the patient's goals includes continuing with treatment    I did explain the role for hospice care at this stage of the patient's illness, including its ability to help the patient live with the best quality of life possible.  We will not be making a hospice referral.            Code status: Full Code    Advance directives: HCPOA and LW on file           18 min time was spent on advance care planning, goals of care discussion, emotional support, formulating and communicating prognosis and goals of care, exploring burden/benefit of various approaches of treatment.        Follow up: 2 weeks        SUBJECTIVE:     History obtained from: Patient      complaint: No chief complaint on file.        History of Present Illness / Interval History:  Baltazar Mccray is 62 y.o. year old male presenting with COPD.  Referred to Palliative Care for evaluation and management of physical symptoms and additional support.. male attended the appointment alone    He shared that he has  COPD in the he goes to the emergency room often because of shortness of breath or running out of oxygen however now that he has the oxygen concentrator he feels more comfortable at home.      He shared that he has all the tools at home to treat the COPD exacerbation like nebulizers the concentrator are BiPAP.      He endorsed  shortness of breath, especially on exertion, he is using 3 L of oxygen.  He shared that he has dyspnea crisis from time to time when he is rushing to do things, he is working on pacing himself.  He also endorses nausea he believes is due to his medications and his nausea is continuous.  He also has anorexia them likely due to his nausea he continues to have some weight loss.      Disease History:  COPD - oxygen dependent, illness as a consequence of sand exposure due his work and smoking since 16 year old (He quit a few months ago).     Afib, HTN, HLD    low health literacy, and frequent hospitalizations.         Previous experience or exposure to a serious illness: no      External  database queried on 03/15/2023  by Raina Moore .   The results reviewed and considered with the clinical data in the decision whether or not to prescribe a controlled substance.     01/30/2023 01/30/2023   1  Lorazepam 0.5 Mg Tablet 15.00  4  Br Ras          Medications:    Current Outpatient Medications:     acetaminophen (TYLENOL) 500 MG tablet, Take 2 tablets (1,000 mg total) by mouth 2 (two) times a day., Disp: 180 tablet, Rfl: 3    albuterol (PROVENTIL/VENTOLIN HFA) 90 mcg/actuation inhaler, Inhale 1-2 puffs into the lungs every 6 (six) hours as needed for Wheezing. Rescue, Disp: 8.5 g, Rfl: 2    albuterol-ipratropium (DUO-NEB) 2.5 mg-0.5 mg/3 mL nebulizer solution, Use 1 vial (3 mLs) by nebulization every 4 (four) hours as needed for Wheezing or Shortness of Breath. Rescue, Disp: 180 mL, Rfl: 2    budesonide (PULMICORT) 0.5 mg/2 mL nebulizer solution, Take 2 mLs (0.5 mg total) by nebulization 2  (two) times a day. Controller, Disp: 1440 mL, Rfl: 3    calcium carbonate (TUMS ORAL), Take 2 tablets by mouth daily as needed (Heartburn)., Disp: , Rfl:     folic acid (FOLVITE) 1 MG tablet, Take 1 tablet (1 mg total) by mouth once daily., Disp: 30 tablet, Rfl: 2    formoterol (PERFOROMIST) 20 mcg/2 mL Nebu, Take 2 mLs (20 mcg total) by nebulization 2 (two) times a day. Controller, Disp: 360 mL, Rfl: 3    furosemide (LASIX) 20 MG tablet, Take 1 tablet (20 mg total) by mouth once daily., Disp: 90 tablet, Rfl: 3    inhalat.spacing dev,large mask (COMPACT SPACE CHAMBER-LRG MASK) Spcr, Use as directed, Disp: 1 each, Rfl: 0    multivit-min-FA-lycopen-lutein (CERTAVITE SENIOR) 0.4 mg-300 mcg- 250 mcg Tab, Take 1 tablet by mouth once daily., Disp: 30 tablet, Rfl: 2    pantoprazole (PROTONIX) 40 MG tablet, Take 1 tablet (40 mg total) by mouth once daily., Disp: 30 tablet, Rfl: 11    thiamine 100 MG tablet, Take 1 tablet (100 mg total) by mouth once daily., Disp: 30 tablet, Rfl: 2    tiotropium bromide (SPIRIVA RESPIMAT) 2.5 mcg/actuation inhaler, Inhale 2 puffs into the lungs Daily. Controller, Disp: 4 g, Rfl: 0    varenicline (CHANTIX STARTING MONTH BOX) 0.5 mg (11)- 1 mg (42) tablet, Take one 0.5mg tab by mouth once daily X3 days,then increase to one 0.5mg tab twice daily X4 days,then increase to one 1mg tab twice daily, Disp: 53 each, Rfl: 0    apixaban (ELIQUIS) 5 mg Tab, Take 1 tablet (5 mg total) by mouth 2 (two) times daily. (Patient not taking: Reported on 3/15/2023), Disp: 180 tablet, Rfl: 3    arformoteroL (BROVANA) 15 mcg/2 mL Nebu, Take 2 mLs (15 mcg total) by nebulization 2 (two) times a day. Controller (Patient not taking: Reported on 3/15/2023), Disp: 360 mL, Rfl: 3    atorvastatin (LIPITOR) 40 MG tablet, Take 1 tablet (40 mg total) by mouth once daily. (Patient not taking: Reported on 3/15/2023), Disp: 90 tablet, Rfl: 3    bismuth subsalicylate (PEPTO BISMOL) 262 mg/15 mL suspension, Take 30 mLs by mouth  daily as needed for Indigestion., Disp: , Rfl:     diltiaZEM (CARDIZEM) 90 MG tablet, Take 2 tablets (180 mg total) by mouth once daily. (Patient not taking: Reported on 3/15/2023), Disp: 180 tablet, Rfl: 3    EScitalopram oxalate (LEXAPRO) 10 MG tablet, Take 1 tablet (10 mg total) by mouth once daily. (Patient not taking: Reported on 3/15/2023), Disp: 90 tablet, Rfl: 3    ferrous sulfate 325 (65 FE) MG EC tablet, Take 1 tablet (325 mg total) by mouth once daily. (Patient not taking: Reported on 3/15/2023), Disp: 30 tablet, Rfl: 1    LORazepam (ATIVAN) 0.5 MG tablet, Take 1 tablet (0.5 mg total) by mouth every 6 (six) hours as needed (anxiety, air hunger)., Disp: 15 tablet, Rfl: 0    losartan (COZAAR) 25 MG tablet, Take 1 tablet (25 mg total) by mouth once daily., Disp: 60 tablet, Rfl: 0    nicotine (NICODERM CQ) 21 mg/24 hr, Place 1 patch onto the skin once daily. (Patient not taking: Reported on 3/15/2023), Disp: 28 patch, Rfl: 11    ondansetron (ZOFRAN-ODT) 4 MG TbDL, Take 1 tablet (4 mg total) by mouth 2 (two) times daily., Disp: 40 tablet, Rfl: 1    predniSONE (DELTASONE) 10 MG tablet, Take 1 tablet (10 mg total) by mouth once daily. (Patient not taking: Reported on 3/15/2023), Disp: 30 tablet, Rfl: 3      Past Medical History:   Diagnosis Date    Aspiration pneumonia 5/30/2021    Asthma     Atrial fibrillation with rapid ventricular response     CHF (congestive heart failure)     COPD (chronic obstructive pulmonary disease)     home O2 at night only    Coronary artery disease     Hypertension     Lung nodule     Pneumonia     Seizures      Past Surgical History:   Procedure Laterality Date    ESOPHAGOGASTRODUODENOSCOPY N/A 2/11/2022    Procedure: EGD (ESOPHAGOGASTRODUODENOSCOPY);  Surgeon: Cain Ortega MD;  Location: [address];  Service: Endoscopy;  Laterality: N/A;    ESOPHAGOGASTRODUODENOSCOPY N/A 9/15/2022    Procedure: EGD (ESOPHAGOGASTRODUODENOSCOPY);  Surgeon: Leonid Chavez MD;  Location:  [facility] ENDO (2ND FLR);  Service: Endoscopy;  Laterality: N/A;  PA-50 - 2nd floor  vacc-wears 2L o2-inst mail and verbal-clears 4 hrs prior-tb  8/30 pt rescheduled; updated instructions mailed-st    LEFT HEART CATHETERIZATION  4/23/2020    Procedure: Left heart cath;  Surgeon: Nic Pollack MD;  Location: [facility] CATH LAB;  Service: Cardiology;;     Family History   Problem Relation Age of Onset    Cancer Father     Hypertension Sister     Stroke Sister     Stroke Brother     Diabetes Neg Hx     Heart disease Neg Hx      Review of patient's allergies indicates:   Allergen Reactions    Benazepril Swelling       OBJECTIVE:       ROS:  Review of Systems   Constitutional:  Positive for activity change and appetite change. Negative for fatigue.   HENT:  Negative for hearing loss and sore throat.    Eyes:  Negative for visual disturbance.   Respiratory:  Positive for cough and shortness of breath.    Cardiovascular:  Negative for chest pain.   Gastrointestinal:  Positive for nausea. Negative for constipation and diarrhea.   Genitourinary:  Negative for dysuria.   Musculoskeletal:  Negative for back pain and gait problem.   Neurological:  Negative for headaches and memory loss.   Psychiatric/Behavioral:  Negative for confusion. The patient is not nervous/anxious.        Review of Symptoms      Symptom Assessment (ESAS 0-10 Scale)  Pain:  0  Dyspnea:  3  Anxiety:  0  Nausea:  5  Depression:  0  Anorexia:  5  Fatigue:  0  Insomnia:  0  Restlessness:  0  Agitation:  0     CAM / Delirium:  Negative  Constipation:  Negative  Diarrhea:  Negative    Anxiety:  Is not nervous/anxious  Constipation:  No constipation    Modified Valerie Scale:  3    Performance Status:  70    Living Arrangements:  Lives alone    Psychosocial/Cultural:   See Palliative Psychosocial Note: No  Lives alone, never , no children, his siblings are his support system. His brother Alexander might  move in with him  **Primary  to  Follow**  Palliative Care  Consult: No    Spiritual:  F - Dia and Belief:  Christian  I - Importance:  Moderate    Advance Care Planning   Advance Directives:   Living Will: Yes        Copy on chart: Yes    [place]: No    Do Not Resuscitate Status: No    Medical Power of : Yes    Agent's Name:  Gladis Mccray   Agent's Contact Number:      Decision Making:  Patient answered questions  Goals of Care: What is most important right now is to focus on avoiding the hospital. Accordingly, we have decided that the best plan to meet the patient's goals includes continuing with treatment.            Physical Exam:  Vitals: Pulse: 76 (03/15/23 0940)  BP: (!) 167/86 (03/15/23 0940)  SpO2: 100 % (03/15/23 0940)  Physical Exam  Vitals reviewed.   Constitutional:       General: He is not in acute distress.     Appearance: He is normal weight.      Interventions: Nasal cannula in place.   HENT:      Head: Normocephalic and atraumatic.   Eyes:      General: No scleral icterus.  Pulmonary:      Effort: Pulmonary effort is normal. No respiratory distress.   Abdominal:      General: There is no distension.   Musculoskeletal:      Cervical back: Neck supple.   Skin:     Findings: No rash.   Neurological:      Mental Status: He is alert and oriented to person, place, and time.   Psychiatric:         Mood and Affect: Mood and affect normal.         Labs:  CBC:   WBC   Date Value Ref Range Status   03/06/2023 7.98 3.90 - 12.70 K/uL Final       Hemoglobin   Date Value Ref Range Status   03/06/2023 14.1 14.0 - 18.0 g/dL Final       POC Hematocrit   Date Value Ref Range Status   03/05/2022 42 36 - 54 %PCV Final     Hematocrit   Date Value Ref Range Status   03/06/2023 46.3 40.0 - 54.0 % Final       MCV   Date Value Ref Range Status   03/06/2023 82 82 - 98 fL Final       Platelets   Date Value Ref Range Status   03/06/2023 333 150 - 450 K/uL Final       LFT:   Lab Results   Component Value Date    AST 76 (H)  03/06/2023    ALKPHOS 86 03/06/2023    BILITOT 0.9 03/06/2023       Albumin:   Albumin   Date Value Ref Range Status   03/06/2023 3.3 (L) 3.5 - 5.2 g/dL Final     Protein:   Total Protein   Date Value Ref Range Status   03/06/2023 7.2 6.0 - 8.4 g/dL Final         Radiology:I have reviewed all pertinent imaging results/findings within the past 24 hours.  Results for orders placed or performed during the hospital encounter of 01/28/23 (from the past 2160 hour(s))   CTA Chest Non-Coronary (PE Studies)    Narrative    EXAMINATION:  CTA CHEST NON CORONARY (PE STUDIES)    CLINICAL HISTORY:  Pulmonary embolism (PE) suspected, unknown D-dimer;    TECHNIQUE:  Low dose axial images, sagittal and coronal reformations were obtained from the thoracic inlet to the lung bases following the IV administration of 100 mL of Omnipaque 350.  Contrast timing was optimized to evaluate the pulmonary arteries.  MIP images were performed.    COMPARISON:  None    FINDINGS:  Pulmonary arteries:Pulmonary arteries are adequately enhanced.No filling defects to indicate pulmonary embolism.    Thoracic soft tissues: Unremarkable.    Aorta: Left-sided aortic arch.  No aneurysm or dissection.    Heart: Normal size. No effusion.    Beti/Mediastinum: No pathologic matti enlargement.    Airways: Patent.    Lungs/Pleura: Moderate diffuse emphysematous changes of the lungs.    2.4 x 0.9 cm airspace disease in the left lower lobe on sagittal 199 could represent minimal consolidation/infiltrate.  Mild scarring or atelectasis are not excluded.    Mild bilateral bronchiectasis most prominent the lower lobes.    Bilateral probable mild interstitial scarring.  Minimal interstitial infiltrate is not excluded.    There are few small subcentimeter pulmonary nodules in the right upper lobe.  Minimal nodular infiltrate or small nodules in the left lower lobe also.    For multiple solid nodules all <6 mm, Fleischner Society 2017 guidelines recommend no routine follow  up for a low risk patient, or follow up with non-contrast chest CT at 12 months after discovery in a high risk patient.    Esophagus: Unremarkable.    Upper Abdomen: No abnormality of the partially imaged upper abdomen.    Bones: No acute fracture. No suspicious lytic or sclerotic lesions.      Impression    1. No evidence of pulmonary embolism.  2. Emphysematous changes of the lungs with mild bilateral interstitial scarring or infiltrate.  3. Mild nodular airspace disease in the left lower lobe could represent minimal consolidation/infiltrate.  Mild scarring or atelectasis are not excluded.  4. Few small lower lobe nonspecific micro nodules.      Electronically signed by: Taye Miller  Date:    01/29/2023  Time:    22:37           I spent a total of 65 minutes on the day of the visit. This includes face to face time in discussion of goals of care, symptom assessment, coordination of care and emotional support.  This also includes non-face to face time preparing to see the patient (eg, review of tests/imaging), obtaining and/or reviewing separately obtained history, documenting clinical information in the electronic or other health record, independently interpreting results and communicating results to the patient/family/caregiver, or care coordinator.     18 minutes spent in discussing ACP    This note was partially created using [employer] Voice Recognition software. Typographical and content errors may occur with this process. While efforts are made to detect and correct such errors, in some cases errors will persist. For this reason, wording in this document should be considered in the proper context and not strictly verbatim.      Signature: Raina Moore MD

## 2023-03-15 NOTE — PROGRESS NOTES
Primary Care Provider Appointment - [facility]  SHARED NOTE: Fuad Christian (MS4), Dr Orona (Attending)    Subjective:      Patient ID: Baltazar Mccray is a 62 y.o. male with  HFpeF, Afib, HTN, HLD, COPD, LIBRA,  low health literacy, tobacco abuse, and frequent hospitalizations.          Chief Complaint: COPD    Prior to this visit, patient's last encounter with PCP was 3/10/2023.    Pt reports he feeling well, no acute complaints today. Doing well on Bipap at home.  He saw his palliative care this morning in regards to advanced care planning. Pt would like CPR and full treatment. He has a 40 pack year smoking history. Pt states that he quit smoking in January of this year and now currently only chews tobacco. Last visit note states the patient continued to smoke cigars. Upon further investigation today, the patient reports he does not light the cigar. He puts it in his mouth to taste the sweetness. When questioned why he smelled of cigarettes today, the patient reports this is from hanging out with his neighbor who was smoking at the time. Currently uses nicotine patches and chantix w/o side effects. Doing well and denies any current craving for cigarettes. Patient previously drank 2 beers a night now down to once a month consisting of 2 beers and once shot of alcohol. Otherwise doing well. Continues to perform ADL's w/o assistance. Plays pool with his brother a few times a week.     HTN  - Currently taking: Furosemide 20 mg qd  - How often taking BP at home: ever morning, average is: 130/60  - Today, BP is: 183/95, he has not taken medication yet today     HLD  - Currently taking: Atorvastatin 40 mg   - Last lipid panel was on 4/19/20, showed elevated total cholesterol, LDL wnl   The 10-year ASCVD risk score (Wily DK, et al., 2019) is: 22.7%    Values used to calculate the score:      Age: 62 years      Sex: Male      Is Non- : Yes      Diabetic: No      Tobacco smoker: No      Systolic Blood  Pressure: 183 mmHg      Is BP treated: Yes      HDL Cholesterol: 91 mg/dL      Total Cholesterol: 260 mg/dL    HFpeF  Currently taking: Furosemide 20 mg    Last Echo 11/20/2022: EF 53%  Has a high salt diet at home, denies any swelling, sob, or orthopnea at this time.     Afib  Currently taking: Eliquis 5 mg BID, diltiaZEM 90 mg BID    Asthma/COPD  - Currently taking: Brovana, albuterol (PROVENTIL/VENTOLIN HFA) 90 mcg/actuation inhaler, and Bipap  - Smoking status: former smoker, currently using chewing tobacco and nicotine patches currently on chantix    - On 3 L of O2 at home    Mental Health  - Currently taking: Escitalopram 10mg  - Does not see a therapist and psychiatrist   - Social support system consists of brother, lives at his apartment with his cat  - Hobbies include playing pool  - He states that he is able to perform ADL's w/o difficulty.    Specialty notes (if they see heme/onc, GI, ortho, ophth, derm, etc...)   Pulmonology  CTA Chest 1/29/23- Images personally reviewed. I agree w/ the radiologist who notes;  1. No evidence of pulmonary embolism.  2. Emphysematous changes of the lungs with mild bilateral interstitial scarring or infiltrate.  3. Mild nodular airspace disease in the left lower lobe could represent minimal consolidation/infiltrate.  Mild scarring or atelectasis are not excluded.  4. Few small lower lobe nonspecific micro nodules.  5. F/u 3 months     Palliative (per appt today):  I engaged the patient in a conversation about advance care planning.  He confirmed his choices for [facility], sister Gladis Mccray, and Viry Mccray and brother Alexander Marroquin.   We did specifically address the patient's likely prognosis, because that he is frail and that he will continue to decline and that if he were to get very sick his outcome would not be good.    We explored the patient's values and preferences for future care.  The patient endorses that what is most important right now is to focus on spending  time at home, remaining as independent as possible, and life prolongation  He shared that his main goal is to live longer and hopefully get better this for him means breathing better.  He is worried about being very short breath and gasping for air.  He shared that he would be okay with resuscitation and being placed on life support, however he would not want to be like that for a prolonged period of time.  When I asked long would be an acceptable time said he has never thought about that.  I shared that it would be important for him to think about this and discuss this with his siblings as they would be the ones making decisions for him if he were to be in that state.   Accordingly, we have decided that the best plan to meet the patient's goals includes continuing with treatment  I did explain the role for hospice care at this stage of the patient's illness, including its ability to help the patient live with the best quality of life possible.  We will not be making a hospice referral.    Care Gaps:  - tetanus: declines  - CRC screen  - Shingles: declines    Medications: Does have pill packs  Morning Pocket:   Acetaminophen 500 mg (2 tablets)   Diltiazam 90 mg (2 tablets)   Eliquis 5 mg   Escitalopram 10mg  Furosemide 20 mg   Ferrous Sulfate 325mg   Pantoprazole 40 mg   Prednisone 10 mg  Varenicline 1 mg     Evening Pocket:   Acetaminophen 500 mg (2 tablets)   Atorvastatin 40 mg   Eliquis 5 mg   Folic Acid 1mg   MVI 50+   Thiamine 100 mg   Varenicline 1 mg    FU in 1 wk to specifically check COPD    4Ms for Medical Decision-Making in Older Adults    Last Completed EAWV: None    MOBILITY:  Get Up and Go:  No flowsheet data found.  Activities of Daily Living:  No flowsheet data found.  Whisper Test:  No flowsheet data found.  Disability Status:  Disability Status 10/3/2018   Are you deaf or do you have serious difficulty hearing? No   Are you blind or do you have serious difficulty seeing, even when wearing glasses?  No   Because of a physical, mental, or emotional condition, do you have serious difficulty concentrating, remembering, or making decisions? No   Do you have serious difficulty walking or climbing stairs? No   Do you have difficulty dressing or bathing? No   Because of a physical, mental, or emotional condition, do you have difficulty doing errands alone such as visiting a doctor's office or shopping? No     Nutrition Screening:  No flowsheet data found. Screening Score: 0-7 Malnourished, 8-11 At Risk, 12-14 Normal    MENTATION:   Depression Patient Health Questionnaire:  Depression Patient Health Questionnaire 3/1/2023   Over the last two weeks how often have you been bothered by little interest or pleasure in doing things Not at all   Over the last two weeks how often have you been bothered by feeling down, depressed or hopeless Not at all   PHQ-2 Total Score 0   PHQ-4 Total Score -     Has Dementia Dx: No    Cognitive Function Screening:  No flowsheet data found.  Cognitive Function Screening Total - Less than 4 = Abnormal,  Greater than or equal to 4 = Normal    MEDICATIONS:  High Risk Medications:  Total Active Medications: 1  EScitalopram oxalate - 10 MG    WHAT MATTERS MOST:  Advance Care Planning   ACP Status:   Patient has had an ACP conversation  Living Will: Yes  Power of : Yes  LaPOST: No    What is most important right now is to focus on spending time at homeremaining as independent as possiblelife prolongation    Accordingly, we have decided that the best plan to meet the patient's goals includes continuing with treatment                   Social History     Socioeconomic History    Marital status: Single   Tobacco Use    Smoking status: Former     Packs/day: 1.00     Years: 30.00     Pack years: 30.00     Types: Cigarettes     Quit date: 2023     Years since quittin.1     Passive exposure: Past    Smokeless tobacco: Never    Tobacco comments:     1-2 cigarettes per day      Patient  quick smoking 1 month ago   Substance and Sexual Activity    Alcohol use: Yes     Alcohol/week: 2.0 standard drinks     Types: 2 Cans of beer per week     Comment: every night    Drug use: No    Sexual activity: Not Currently   Social History Narrative    Patient' nephew is currently living with him in his apartment. Patient's sister Viry ([phone]) helps manage patient's care.            6/3/21    Patient is no longer staying with family. Patient is currently staying at the [facility] and working on getting into their program for more supportive housing.      Social Determinants of Health     Financial Resource Strain: Low Risk     Difficulty of Paying Living Expenses: Not hard at all   Food Insecurity: Unknown    Worried About Running Out of Food in the Last Year: Patient refused    Ran Out of Food in the Last Year: Patient refused   Transportation Needs: No Transportation Needs    Lack of Transportation (Medical): No    Lack of Transportation (Non-Medical): No   Physical Activity: Unknown    Days of Exercise per Week: Patient refused    Minutes of Exercise per Session: Patient refused   Stress: Stress Concern Present    Feeling of Stress : Rather much   Social Connections: Socially Isolated    Frequency of Communication with Friends and Family: More than three times a week    Frequency of Social Gatherings with Friends and Family: Twice a week    Attends [employer] Services: Never    Active Member of Clubs or Organizations: No    Attends Club or Organization Meetings: Patient refused    Marital Status: Never    Housing Stability: Low Risk     Unable to Pay for Housing in the Last Year: No    Number of Places Lived in the Last Year: 2    Unstable Housing in the Last Year: No       Review of Systems   Constitutional:  Negative for chills and fever.   Respiratory:  Negative for chest tightness, shortness of breath and wheezing.    Cardiovascular:  Negative for chest pain, palpitations and leg swelling.  "  Gastrointestinal:  Negative for abdominal distention, abdominal pain, constipation and diarrhea.   Genitourinary:  Negative for difficulty urinating.   Neurological:  Negative for dizziness, weakness, light-headedness and headaches.   Psychiatric/Behavioral:  Negative for sleep disturbance.      Objective:   BP (!) 183/95   Pulse 80   Resp 16   Ht 5' 6" (1.676 m)   Wt 58.6 kg (129 lb 1.3 oz)   SpO2 100%   BMI 20.83 kg/m²     Physical Exam  Constitutional:       General: He is not in acute distress.     Appearance: He is ill-appearing.      Comments: Frail  Ambulating with portable oxygen   HENT:      Head: Normocephalic and atraumatic.   Eyes:      Extraocular Movements: Extraocular movements intact.   Cardiovascular:      Rate and Rhythm: Normal rate and regular rhythm.      Pulses: Normal pulses.      Heart sounds: Normal heart sounds. No murmur heard.  Pulmonary:      Breath sounds: Wheezing present.      Comments: Wearing nasal cannula O2  Distant lung sounds  Abdominal:      General: Abdomen is flat. Bowel sounds are normal. There is no distension.      Palpations: Abdomen is soft. There is no mass.      Tenderness: There is no abdominal tenderness.   Musculoskeletal:         General: Normal range of motion.      Right lower leg: No edema.      Left lower leg: No edema.      Comments: Low muscle mass  BMI 20   Skin:     Capillary Refill: Capillary refill takes less than 2 seconds.      Findings: Bruising present.   Neurological:      General: No focal deficit present.      Mental Status: He is alert. Mental status is at baseline.      Motor: Weakness present.      Gait: Gait abnormal.   Psychiatric:         Mood and Affect: Mood normal.           Lab Results   Component Value Date    WBC 7.98 03/06/2023    HGB 14.1 03/06/2023    HCT 46.3 03/06/2023     03/06/2023    CHOL 260 (H) 04/19/2020    TRIG 145 04/19/2020    HDL 91 (H) 04/19/2020    ALT 84 (H) 03/06/2023    AST 76 (H) 03/06/2023     " (L) 03/06/2023    K 4.1 03/06/2023    CL 89 (L) 03/06/2023    CREATININE 0.9 03/06/2023    BUN 15 03/06/2023    CO2 30 (H) 03/06/2023    TSH 1.137 12/05/2022    INR 1.1 12/08/2022    HGBA1C 5.7 (H) 02/10/2022       Current Outpatient Medications on File Prior to Visit   Medication Sig Dispense Refill    acetaminophen (TYLENOL) 500 MG tablet Take 2 tablets (1,000 mg total) by mouth 2 (two) times a day. 180 tablet 3    albuterol (PROVENTIL/VENTOLIN HFA) 90 mcg/actuation inhaler Inhale 1-2 puffs into the lungs every 6 (six) hours as needed for Wheezing. Rescue 8.5 g 2    albuterol-ipratropium (DUO-NEB) 2.5 mg-0.5 mg/3 mL nebulizer solution Use 1 vial (3 mLs) by nebulization every 4 (four) hours as needed for Wheezing or Shortness of Breath. Rescue 180 mL 2    apixaban (ELIQUIS) 5 mg Tab Take 1 tablet (5 mg total) by mouth 2 (two) times daily. (Patient not taking: Reported on 3/15/2023) 180 tablet 3    arformoteroL (BROVANA) 15 mcg/2 mL Nebu Take 2 mLs (15 mcg total) by nebulization 2 (two) times a day. Controller (Patient not taking: Reported on 3/15/2023) 360 mL 3    atorvastatin (LIPITOR) 40 MG tablet Take 1 tablet (40 mg total) by mouth once daily. (Patient not taking: Reported on 3/15/2023) 90 tablet 3    bismuth subsalicylate (PEPTO BISMOL) 262 mg/15 mL suspension Take 30 mLs by mouth daily as needed for Indigestion.      budesonide (PULMICORT) 0.5 mg/2 mL nebulizer solution Take 2 mLs (0.5 mg total) by nebulization 2 (two) times a day. Controller 1440 mL 3    calcium carbonate (TUMS ORAL) Take 2 tablets by mouth daily as needed (Heartburn).      diltiaZEM (CARDIZEM) 90 MG tablet Take 2 tablets (180 mg total) by mouth once daily. (Patient not taking: Reported on 3/15/2023) 180 tablet 3    EScitalopram oxalate (LEXAPRO) 10 MG tablet Take 1 tablet (10 mg total) by mouth once daily. (Patient not taking: Reported on 3/15/2023) 90 tablet 3    ferrous sulfate 325 (65 FE) MG EC tablet Take 1 tablet (325 mg total) by  mouth once daily. (Patient not taking: Reported on 3/15/2023) 30 tablet 1    folic acid (FOLVITE) 1 MG tablet Take 1 tablet (1 mg total) by mouth once daily. 30 tablet 2    formoterol (PERFOROMIST) 20 mcg/2 mL Nebu Take 2 mLs (20 mcg total) by nebulization 2 (two) times a day. Controller 360 mL 3    furosemide (LASIX) 20 MG tablet Take 1 tablet (20 mg total) by mouth once daily. 90 tablet 3    inhalat.spacing dev,large mask (COMPACT SPACE CHAMBER-LRG MASK) Spcr Use as directed 1 each 0    LORazepam (ATIVAN) 0.5 MG tablet Take 1 tablet (0.5 mg total) by mouth every 6 (six) hours as needed (anxiety, air hunger). 15 tablet 0    losartan (COZAAR) 25 MG tablet Take 1 tablet (25 mg total) by mouth once daily. 60 tablet 0    multivit-min-FA-lycopen-lutein (CERTAVITE SENIOR) 0.4 mg-300 mcg- 250 mcg Tab Take 1 tablet by mouth once daily. 30 tablet 2    nicotine (NICODERM CQ) 21 mg/24 hr Place 1 patch onto the skin once daily. (Patient not taking: Reported on 3/15/2023) 28 patch 11    ondansetron (ZOFRAN-ODT) 4 MG TbDL Dissolve 1 tablet (4 mg total) by mouth 2 (two) times daily. 40 tablet 1    pantoprazole (PROTONIX) 40 MG tablet Take 1 tablet (40 mg total) by mouth once daily. 30 tablet 11    predniSONE (DELTASONE) 10 MG tablet Take 1 tablet (10 mg total) by mouth once daily. (Patient not taking: Reported on 3/15/2023) 30 tablet 3    thiamine 100 MG tablet Take 1 tablet (100 mg total) by mouth once daily. 30 tablet 2    tiotropium bromide (SPIRIVA RESPIMAT) 2.5 mcg/actuation inhaler Inhale 2 puffs into the lungs Daily. Controller 4 g 0    varenicline (CHANTIX STARTING MONTH BOX) 0.5 mg (11)- 1 mg (42) tablet Take one 0.5mg tab by mouth once daily X3 days,then increase to one 0.5mg tab twice daily X4 days,then increase to one 1mg tab twice daily 53 each 0     No current facility-administered medications on file prior to visit.         Assessment:   62 y.o. male with multiple co-morbid illnesses here to follow-up with PCP and  continue work-up of chronic issues    Plan:     Problem List Items Addressed This Visit          Psychiatric    Alcohol abuse     Folic acid, thiamine and MVI in pill packs  Continue current regimen  Advised to avoid tobacco and alcohol            Pulmonary    Chronic obstructive pulmonary disease (Chronic)     Poor compliance with inhalers, does not have the dexterity or strength to use hand-held inhalers  Continue inhalers in neb machine and powder delivered controller inhalers            Cardiac/Vascular    Essential hypertension - Primary (Chronic)     BP uncontrolled today, did not take AM meds   Stressed the importance of him bringing ALL his medications and supplies to the next meeting  Will take meds upon return home         Chronic diastolic congestive heart failure     Low EF of 53%, has diastolic HF on echo. Taking lasix chronically  Monitor  Lasix in pill packs             Atrial fibrillation     COPD on 2L, Atrial Fibrillation, HFpEF (EF 53%) with AF RVR on admission with rates up to 170bpm.   On eliquis  Diltiazem  Treat COPD         (HFpEF) heart failure with preserved ejection fraction     HFpEF (EF 53%), on chronic O2  On eliquis  Diltiazem  Treat COPD            Other    Cigarette nicotine dependence without complication     Has chantix in pill packs. Sucks on flavored cigars  Advised to avoid triggers            Health Maintenance         Date Due Completion Date    TETANUS VACCINE Never done ---    Colorectal Cancer Screening Never done ---    Shingles Vaccine (2 of 2) 03/15/2023 1/18/2023    High Dose Statin 03/10/2024 3/10/2023    Lipid Panel 04/19/2025 4/19/2020    Pneumococcal Vaccines (Age 0-64) (3 - PPSV23 if available, else PCV20) 12/19/2027 12/19/2022            Future Appointments   Date Time Provider Department Center   3/22/2023  8:00 AM Susan Rae, NP [facility] C3HV [place]   3/22/2023 12:30 PM Delphine Orona MD [facility] MED CLN [facility] PC[facility]   3/29/2023  1:00 PM Delphine Orona  MD [facility] JOHAN [place] PCW   4/17/2023  9:00 AM NURSE, [facility] CLINIC [facility] MED CLDEVEN [place] PCW   4/24/2023  1:30 PM Hung Malave MD [facility] SLEEP [facility]   5/9/2023  2:00 PM Raina Moore MD [facility] PAL MED [address]   6/6/2023  8:00 AM Mary Jo Rueda, [place] [facility] OPTOMTY [address]         Follow up in about 1 week (around 3/22/2023). Total clinical care time was 10 min, issues addressed include: COPD, palliation      Delphine Orona MD/MPH  [facility]  [phone] [employer]

## 2023-03-16 ENCOUNTER — PATIENT OUTREACH (OUTPATIENT)
Dept: ADMINISTRATIVE | Facility: HOSPITAL | Age: 63
End: 2023-03-16
Payer: MEDICARE

## 2023-03-16 NOTE — PROGRESS NOTES
Health Maintenance Due   Topic Date Due    TETANUS VACCINE  Never done    Colorectal Cancer Screening  Never done    Shingles Vaccine (2 of 2) 03/15/2023     Chart reviewed. Triggered LINKS. Updated Care Everywhere.     Carlyn Mancilla CMA  Population Health Care Coordinator  Primary Care Team

## 2023-03-17 NOTE — DISCHARGE SUMMARY
[facility] Hospital Medicine  Discharge Summary      Patient Name: Baltazar Mccray  MRN: [id number]  Admission Date: 10/3/2018  Hospital Length of Stay: 1 days  Discharge Date and Time: 10/4/2018  3:41 PM  Attending Physician: No att. providers found   Discharging Provider: Massimo Lewis MD  Primary Care Provider: [facility] Medicine Team: [facility] HOSP MED A Massimo Lewis MD    HPI: This is a 58 year old male with  HTN, asthma, COPD (on home oxygen at night), seizure disorder (not on med) admitted for shortness of breath. Patient reports he was at work this morning when he began with shortness of breath characteristic of his typical asthma attacks. He took his proair rescue inhaler with resolution of his symptoms. He had activated EMS at that time and was transported for further evaluation. Patient asymptomatic without labored breathing en route. No medications were given en route. Patient with admission in August for COPD exacerbation - completed oral steroids and azithromycin. He had resolution of his symptoms after that episode. He has symbicort and Breo listed on his med card but has not been taking these. He also reports he has not taken his BP meds. Denies fever, chills, chest pain, cough, URI symptoms, peripheral edema.       * No surgery found *      Hospital Course: Admitted to hospital medicine on 10/3/2018 with COPD exacerbation/Acute hypoxemic respiratory failure/Tobacco abuse. Admitted 10/3/2018 for shortness of breath. In ED O2 sat dipped to 77%. CXR is clear. Patient given nebs and PO steroids. Place on supplemental oxygen with improvement. COPD pathway started. PO steroid, (Neb Xopenex as Duo-neb causes him to have tremors), Breo, spiriva, Zpak. Nicotine patch, pt report decrease smoking but no ready to quit. Wean down O2, goal 88-92%, on room air, he uses home oxygen at night only. Pt report having insurance, refills meds, D/C home with PO prednisone and PO  Zpak. Refill proair, add spiriva. Essential hypertension, restart home meds, poorly controlled, increase Amlodipine, start Hyzaar. Encourage follow up in clinic. Pt says he has insurance. email to admit to update. Pt says he can pay for meds. Appt made with Ronald on 10/11/18 at Noon time.       Consults:     No new Assessment & Plan notes have been filed under this hospital service since the last note was generated.  Service: Hospital Medicine    Final Active Diagnoses:    Diagnosis Date Noted POA    PRINCIPAL PROBLEM:  COPD exacerbation [J44.1] 05/14/2017 Yes    Acute hypoxemic respiratory failure [J96.01] 10/03/2018 Yes    Tobacco abuse [Z72.0] 08/12/2018 Yes    Chronic respiratory failure with hypoxia and hypercapnia [J96.11, J96.12] 08/12/2018 Yes    Essential hypertension [I10] 05/19/2017 Yes      Problems Resolved During this Admission:       Discharged Condition: stable    Disposition: Home or Self Care    Follow Up:  Follow-up Information     Go to [facility].    Why:  Office visit, Post Hospital Follow Up on OCT 11 AT 12 NOON THURSDAY-RONALD ERICKSON  Contact information:  [address]  [facility] [address]  [phone]                 Patient Instructions:      Ambulatory Referral to Pulmonary Rehab   Referral Priority: Routine Referral Type: Consultation   Referral Reason: Specialty Services Required   Requested Specialty: Pulmonary Disease   Number of Visits Requested: 1     Ambulatory referral to Smoking Cessation Program   Referral Priority: Routine Referral Type: Consultation   Referral Reason: Specialty Services Required   Requested Specialty: CTTS   Number of Visits Requested: 1     Ambulatory referral to Outpatient Case Management   Referral Priority: Routine Referral Type: Consultation   Referral Reason: Specialty Services Required   Number of Visits Requested: 1     Diet Cardiac     Notify your health care provider if you  experience any of the following:  severe uncontrolled pain     Notify your health care provider if you experience any of the following:  redness, tenderness, or signs of infection (pain, swelling, redness, odor or green/yellow discharge around incision site)     Activity as tolerated       Significant Diagnostic Studies: Labs:   Recent Labs   Lab  10/03/18   0834   WBC  5.62   HGB  14.6   HCT  47.6   PLT  256           Recent Labs   Lab  10/03/18   0834  10/04/18   0412   NA  141  138   K  3.9  3.7   CL  103  99   CO2  26  25   BUN  7  9   CREATININE  0.8  0.7   GLU  67*  78   CALCIUM  8.8  9.7   MG   --   1.7   PHOS   --   2.4*   ALKPHOS  108  100   ALT  38  33   AST  60*  41*   ALBUMIN  3.5  3.3*   PROT  7.8  7.4   BILITOT  0.3  0.4           Recent Labs   Lab  10/04/18   0716  10/04/18   1216   POCTGLUCOSE  75  147*      A1C:       Recent Labs   Lab  08/12/18 2000   HGBA1C  5.1          Recent Labs   Lab  10/03/18   0834   TROPONINI  0.017        Radiology:   Imaging Results          X-Ray Chest PA And Lateral (Final result)  Result time 10/03/18 08:28:51    Final result by Naeem Choi MD (10/03/18 08:28:51)                 Impression:      See above      Electronically signed by: Naeem Choi MD  Date:    10/03/2018  Time:    08:28             Narrative:    EXAMINATION:  XR CHEST PA AND LATERAL    CLINICAL HISTORY:  Shortness of breath    TECHNIQUE:  PA and lateral views of the chest were performed.    COMPARISON:  Non 09/20/2018 e    FINDINGS:  Heart size normal.  The lungs are clear.  No pleural effusion                                Pending Diagnostic Studies:     None         Medications:  Reconciled Home Medications:      Medication List      START taking these medications    losartan-hydrochlorothiazide 100-12.5 mg 100-12.5 mg Tab  Commonly known as:  HYZAAR  Take 1 tablet by mouth once daily. Contact [facility] for refills     nicotine 14 mg/24 hr  Commonly known as:  NICODERM CQ  Place 1  patch onto the skin once daily.     predniSONE 20 MG tablet  Commonly known as:  DELTASONE  Take 2 tablets (40 mg total) by mouth once daily. for 2 days        CHANGE how you take these medications    amLODIPine 10 MG tablet  Commonly known as:  NORVASC  Take 1 tablet (10 mg total) by mouth once daily. Contact [facility] for refills  What changed:    · medication strength  · how much to take  · additional instructions     azithromycin 250 MG tablet  Commonly known as:  Z-DIEGO  Take 1 tablet (250 mg total) by mouth once daily. For 2 days  What changed:  additional instructions     fluticasone-vilanterol 100-25 mcg/dose diskus inhaler  Commonly known as:  BREO ELLIPTA  Inhale 1 puff into the lungs once daily. Controller  What changed:  Another medication with the same name was removed. Continue taking this medication, and follow the directions you see here.     ipratropium-albuterol  mcg/actuation inhaler  Commonly known as:  COMBIVENT  Inhale 1 puff into the lungs 4 (four) times daily. Rescue  What changed:  Another medication with the same name was removed. Continue taking this medication, and follow the directions you see here.     tiotropium 18 mcg inhalation capsule  Commonly known as:  SPIRIVA  Inhale 1 capsule (18 mcg total) into the lungs once daily. Controller. Contact [facility] for refills  What changed:  additional instructions        CONTINUE taking these medications    * albuterol 2.5 mg /3 mL (0.083 %) nebulizer solution  Commonly known as:  PROVENTIL  Take 3 mLs (2.5 mg total) by nebulization every 4 (four) hours as needed (shortness of breath). Rescue     * albuterol 90 mcg/actuation inhaler  Commonly known as:  PROVENTIL/VENTOLIN HFA  Inhale 2 puffs into the lungs every 6 (six) hours as needed for Wheezing.     ibuprofen 800 MG tablet  Commonly known as:  ADVIL,MOTRIN  Take 1 tablet (800 mg total) by mouth every 6 (six) hours as needed for Pain.     omeprazole 20 MG  capsule  Commonly known as:  PRILOSEC  Take 1 capsule (20 mg total) by mouth once daily.         * This list has 2 medication(s) that are the same as other medications prescribed for you. Read the directions carefully, and ask your doctor or other care provider to review them with you.            STOP taking these medications    benzonatate 200 MG capsule  Commonly known as:  TESSALON            Indwelling Lines/Drains at time of discharge:   Lines/Drains/Airways          None          Time spent on the discharge of patient: >30 minutes  Patient was seen and examined on the date of discharge and determined to be suitable for discharge.         Massimo Lewis MD  Department of Hospital Medicine  [facility]   Double O-Z Plasty Text: The defect edges were debeveled with a #15 scalpel blade. Given the location of the defect, shape of the defect and the proximity to free margins a Double O-Z plasty (double transposition flap) was deemed most appropriate. Using a sterile surgical marker, the appropriate transposition flaps were drawn incorporating the defect and placing the expected incisions within the relaxed skin tension lines where possible. The area thus outlined was incised deep to adipose tissue with a #15 scalpel blade. The skin margins were undermined to an appropriate distance in all directions utilizing iris scissors. Hemostasis was achieved with electrocautery. The flaps were then transposed and carried over into place, one clockwise and the other counterclockwise, and anchored with interrupted buried subcutaneous sutures.

## 2023-03-18 ENCOUNTER — DOCUMENT SCAN (OUTPATIENT)
Dept: HOME HEALTH SERVICES | Facility: HOSPITAL | Age: 63
End: 2023-03-18
Payer: MEDICARE

## 2023-03-20 DIAGNOSIS — J42 CHRONIC BRONCHITIS, UNSPECIFIED CHRONIC BRONCHITIS TYPE: ICD-10-CM

## 2023-03-20 RX ORDER — FERROUS SULFATE 325(65) MG
325 TABLET, DELAYED RELEASE (ENTERIC COATED) ORAL DAILY
Qty: 30 TABLET | Refills: 1 | Status: SHIPPED | OUTPATIENT
Start: 2023-03-20

## 2023-03-20 RX ORDER — LANOLIN ALCOHOL/MO/W.PET/CERES
100 CREAM (GRAM) TOPICAL DAILY
Qty: 30 TABLET | Refills: 2 | Status: SHIPPED | OUTPATIENT
Start: 2023-03-20

## 2023-03-20 RX ORDER — FOLIC ACID 1 MG/1
1 TABLET ORAL DAILY
Qty: 30 TABLET | Refills: 2 | Status: SHIPPED | OUTPATIENT
Start: 2023-03-20 | End: 2024-03-19

## 2023-03-21 ENCOUNTER — TELEPHONE (OUTPATIENT)
Dept: PRIMARY CARE CLINIC | Facility: CLINIC | Age: 63
End: 2023-03-21
Payer: MEDICARE

## 2023-03-21 DIAGNOSIS — F17.210 CIGARETTE NICOTINE DEPENDENCE WITHOUT COMPLICATION: ICD-10-CM

## 2023-03-21 RX ORDER — VARENICLINE TARTRATE 1 MG/1
TABLET, FILM COATED ORAL 2 TIMES DAILY
Qty: 168 TABLET | Refills: 0 | Status: SHIPPED | OUTPATIENT
Start: 2023-03-21

## 2023-03-22 ENCOUNTER — OFFICE VISIT (OUTPATIENT)
Dept: PRIMARY CARE CLINIC | Facility: CLINIC | Age: 63
End: 2023-03-22
Payer: MEDICARE

## 2023-03-22 VITALS
WEIGHT: 133.06 LBS | BODY MASS INDEX: 21.38 KG/M2 | SYSTOLIC BLOOD PRESSURE: 117 MMHG | HEART RATE: 90 BPM | DIASTOLIC BLOOD PRESSURE: 62 MMHG | TEMPERATURE: 98 F | OXYGEN SATURATION: 90 % | HEIGHT: 66 IN

## 2023-03-22 DIAGNOSIS — F10.10 ALCOHOL ABUSE: ICD-10-CM

## 2023-03-22 DIAGNOSIS — I10 ESSENTIAL HYPERTENSION: Chronic | ICD-10-CM

## 2023-03-22 DIAGNOSIS — J44.9 CHRONIC OBSTRUCTIVE PULMONARY DISEASE, UNSPECIFIED COPD TYPE: Primary | ICD-10-CM

## 2023-03-22 DIAGNOSIS — H04.123 DRY EYES: ICD-10-CM

## 2023-03-22 DIAGNOSIS — I50.32 CHRONIC DIASTOLIC CONGESTIVE HEART FAILURE: ICD-10-CM

## 2023-03-22 PROCEDURE — 4010F ACE/ARB THERAPY RXD/TAKEN: CPT | Mod: CPTII,S$GLB,, | Performed by: INTERNAL MEDICINE

## 2023-03-22 PROCEDURE — 99211 PR OFFICE/OUTPT VISIT, EST, LEVL I: ICD-10-PCS | Mod: S$GLB,,, | Performed by: INTERNAL MEDICINE

## 2023-03-22 PROCEDURE — 3008F BODY MASS INDEX DOCD: CPT | Mod: CPTII,S$GLB,, | Performed by: INTERNAL MEDICINE

## 2023-03-22 PROCEDURE — 3078F PR MOST RECENT DIASTOLIC BLOOD PRESSURE < 80 MM HG: ICD-10-PCS | Mod: CPTII,S$GLB,, | Performed by: INTERNAL MEDICINE

## 2023-03-22 PROCEDURE — 3008F PR BODY MASS INDEX (BMI) DOCUMENTED: ICD-10-PCS | Mod: CPTII,S$GLB,, | Performed by: INTERNAL MEDICINE

## 2023-03-22 PROCEDURE — 3078F DIAST BP <80 MM HG: CPT | Mod: CPTII,S$GLB,, | Performed by: INTERNAL MEDICINE

## 2023-03-22 PROCEDURE — 4010F PR ACE/ARB THEARPY RXD/TAKEN: ICD-10-PCS | Mod: CPTII,S$GLB,, | Performed by: INTERNAL MEDICINE

## 2023-03-22 PROCEDURE — 1159F MED LIST DOCD IN RCRD: CPT | Mod: CPTII,S$GLB,, | Performed by: INTERNAL MEDICINE

## 2023-03-22 PROCEDURE — 3074F SYST BP LT 130 MM HG: CPT | Mod: CPTII,S$GLB,, | Performed by: INTERNAL MEDICINE

## 2023-03-22 PROCEDURE — 99211 OFF/OP EST MAY X REQ PHY/QHP: CPT | Mod: S$GLB,,, | Performed by: INTERNAL MEDICINE

## 2023-03-22 PROCEDURE — 1159F PR MEDICATION LIST DOCUMENTED IN MEDICAL RECORD: ICD-10-PCS | Mod: CPTII,S$GLB,, | Performed by: INTERNAL MEDICINE

## 2023-03-22 PROCEDURE — 3074F PR MOST RECENT SYSTOLIC BLOOD PRESSURE < 130 MM HG: ICD-10-PCS | Mod: CPTII,S$GLB,, | Performed by: INTERNAL MEDICINE

## 2023-03-22 RX ORDER — TALC
3 POWDER (GRAM) TOPICAL NIGHTLY
Qty: 30 TABLET | Refills: 3 | Status: CANCELLED | OUTPATIENT
Start: 2023-03-22

## 2023-03-22 NOTE — ASSESSMENT & PLAN NOTE
Patient using numerous inhaled neb treatments, which may be drying his eyes. Also is getting inadequate sleep  · OTC eye lubricants ordered from [facility]

## 2023-03-22 NOTE — PROGRESS NOTES
Primary Care Provider Appointment - [facility]  SHARED NOTE: Yuval Thayer (MS4), Dr Orona (Attending)    Subjective:      Patient ID: Baltazar Mccray is a 62 y.o. male with HFpeF, Afib, HTN, HLD, COPD, LIBRA,  low health literacy, tobacco abuse, and frequent hospitalizations.      Chief Complaint: Follow-up    Prior to this visit, patient's last encounter with PCP was 3/15/2023.    Pt here for follow up. At last visit, pt was instructed to bring all meds and supplies for review.    Having difficulty falling asleep, lays there for a couple hours until he falls asleep, then wakes up after a couple hours and then has difficulty falling back asleep. Hasn't ever tried anything for sleeping. Recommended OTC melatonin 3 mg and offered counseling - defers today. States he's using his Bipap nightly. Denies smoking. Was short of breath this morning and had to take nebs x 2 but feels well now. Reports that the does not need any neb refills currently. Has some blurry vision and needs some glasses. Has appt scheduled in June. Reports dry eyes - asking for some drops. We will check with his insurance and order some drops to be sent to his house. States he gets migraines daily. Takes tylenol 1000 mg twice daily and says he doesn't have the HA, but as long as he's taking the tylenol he doesn't get HAs. Tylenol and chantix included in his pill packs filled on 3/15/2023. Denies N/V/F/C/CP, palps, SOB.      HTN  - Currently taking: Furosemide 20 mg qd  - How often taking BP at home: every morning, average is: 130s  - Today, BP is: 100/58, 117/62     HLD  - Currently taking: Atorvastatin 40 mg   - Last lipid panel was on 4/19/20, showed elevated total cholesterol, LDL wnl   The 10-year ASCVD risk score (Wily EVANS, et al., 2019) is: 22.7%    Values used to calculate the score:      Age: 62 years      Sex: Male      Is Non- : Yes      Diabetic: No      Tobacco smoker: No      Systolic Blood Pressure: 183 mmHg       Is BP treated: Yes      HDL Cholesterol: 91 mg/dL      Total Cholesterol: 260 mg/dL     HFpeF  Currently taking: Furosemide 20 mg    Last Echo 11/20/2022: EF 53%  Has a high salt diet at home, denies any swelling, sob, or orthopnea at this time.      Afib  Currently taking: Eliquis 5 mg BID, diltiaZEM 90 mg BID     Asthma/COPD  - Currently taking: Brovana, albuterol (PROVENTIL/VENTOLIN HFA) 90 mcg/actuation inhaler, and Bipap  - Smoking status: former smoker, currently using chewing tobacco and nicotine patches currently on chantix    - On 3 L of O2 at home     Mental Health  - Currently taking: Escitalopram 10mg  - Does not see a therapist and psychiatrist   - Social support system consists of brother, lives at his apartment with his cat  - Hobbies include playing pool  - He states that he is able to perform ADL's w/o difficulty.     Specialty notes   Pulmonology  CTA Chest 1/29/23- Images personally reviewed. I agree w/ the radiologist who notes;  1. No evidence of pulmonary embolism.  2. Emphysematous changes of the lungs with mild bilateral interstitial scarring or infiltrate.  3. Mild nodular airspace disease in the left lower lobe could represent minimal consolidation/infiltrate.  Mild scarring or atelectasis are not excluded.  4. Few small lower lobe nonspecific micro nodules.  5. F/u 3 months     Care Gaps:  - tetanus: declines  - CRC screen  - Shingles: declines    Medications: Does have pill packs  - Adherence packed using Dispill for 7 days:   (Compressed monthly)    Last dispensed 3/15/2023 - delivered to his house     Morning Pocket:   Acetaminophen 500 mg (2 tablets)   Diltiazam 90 mg (2 tablets)   Eliquis 5 mg   Escitalopram 10mg  Furosemide 20 mg   Ferrous Sulfate 325mg   Pantoprazole 40 mg   Prednisone 10 mg  Varenicline 1 mg     Evening Pocket:   Acetaminophen 500 mg (2 tablets)   Atorvastatin 40 mg   Eliquis 5 mg   Folic Acid 1mg   MVI 50+   Thiamine 100 mg   Varenicline 1 mg    FU in 1 week to  specifically check COPD      4Ms for Medical Decision-Making in Older Adults    Last Completed EAWV: None    MOBILITY:  Get Up and Go:  No flowsheet data found.  Activities of Daily Living:  No flowsheet data found.  Whisper Test:  No flowsheet data found.  Disability Status:  Disability Status 10/3/2018   Are you deaf or do you have serious difficulty hearing? No   Are you blind or do you have serious difficulty seeing, even when wearing glasses? No   Because of a physical, mental, or emotional condition, do you have serious difficulty concentrating, remembering, or making decisions? No   Do you have serious difficulty walking or climbing stairs? No   Do you have difficulty dressing or bathing? No   Because of a physical, mental, or emotional condition, do you have difficulty doing errands alone such as visiting a doctor's office or shopping? No     Nutrition Screening:  No flowsheet data found. Screening Score: 0-7 Malnourished, 8-11 At Risk, 12-14 Normal    MENTATION:   Depression Patient Health Questionnaire:  Depression Patient Health Questionnaire 3/22/2023   Over the last two weeks how often have you been bothered by little interest or pleasure in doing things Not at all   Over the last two weeks how often have you been bothered by feeling down, depressed or hopeless Not at all   PHQ-2 Total Score 0   PHQ-4 Total Score -     Has Dementia Dx: No    Cognitive Function Screening:  No flowsheet data found.  Cognitive Function Screening Total - Less than 4 = Abnormal,  Greater than or equal to 4 = Normal    MEDICATIONS:  High Risk Medications:  Total Active Medications: 1  EScitalopram oxalate - 10 MG    WHAT MATTERS MOST:  Advance Care Planning   ACP Status:   Patient has had an ACP conversation  Living Will: Yes  Power of : Yes  LaPOST: No    What is most important right now is to focus on spending time at homeremaining as independent as possiblelife prolongation    Accordingly, we have decided that the  best plan to meet the patient's goals includes continuing with treatment                   Social History     Socioeconomic History    Marital status: Single   Tobacco Use    Smoking status: Former     Packs/day: 1.00     Years: 30.00     Pack years: 30.00     Types: Cigarettes     Quit date: 2023     Years since quittin.1     Passive exposure: Past    Smokeless tobacco: Never    Tobacco comments:     1-2 cigarettes per day      Patient quick smoking 1 month ago   Substance and Sexual Activity    Alcohol use: Yes     Alcohol/week: 2.0 standard drinks     Types: 2 Cans of beer per week     Comment: every night    Drug use: No    Sexual activity: Not Currently   Social History Narrative    Patient' nephew is currently living with him in his apartment. Patient's sister Viry ([phone]) helps manage patient's care.            6/3/21    Patient is no longer staying with family. Patient is currently staying at the [facility] and working on getting into their program for more supportive housing.      Social Determinants of Health     Financial Resource Strain: Low Risk     Difficulty of Paying Living Expenses: Not hard at all   Food Insecurity: Unknown    Worried About Running Out of Food in the Last Year: Patient refused    Ran Out of Food in the Last Year: Patient refused   Transportation Needs: No Transportation Needs    Lack of Transportation (Medical): No    Lack of Transportation (Non-Medical): No   Physical Activity: Unknown    Days of Exercise per Week: Patient refused    Minutes of Exercise per Session: Patient refused   Stress: Stress Concern Present    Feeling of Stress : Rather much   Social Connections: Socially Isolated    Frequency of Communication with Friends and Family: More than three times a week    Frequency of Social Gatherings with Friends and Family: Twice a week    Attends [employer] Services: Never    Active Member of Clubs or Organizations: No    Attends Club or Organization  "Meetings: Patient refused    Marital Status: Never    Housing Stability: Low Risk     Unable to Pay for Housing in the Last Year: No    Number of Places Lived in the Last Year: 2    Unstable Housing in the Last Year: No       Review of Systems   Constitutional:  Negative for chills, fatigue and fever.   HENT:  Negative for congestion, sinus pressure, sneezing and sore throat.    Eyes:  Positive for visual disturbance.        Blurry vision - has eye appt   Respiratory:  Positive for cough, shortness of breath and wheezing. Negative for chest tightness.         Dry cough, exertional SOB   Cardiovascular:  Negative for chest pain, palpitations and leg swelling.   Gastrointestinal:  Negative for abdominal distention, abdominal pain, constipation and diarrhea.   Genitourinary:  Positive for frequency. Negative for difficulty urinating.        Nocturia 3x/night     Musculoskeletal:  Negative for back pain.   Neurological:  Negative for dizziness, weakness, light-headedness and headaches.   Psychiatric/Behavioral:  Positive for sleep disturbance.         Difficulty initiating and staying asleep   All other systems reviewed and are negative.    Objective:   /62   Pulse 90   Temp 98.3 °F (36.8 °C) (Oral)   Ht 5' 6" (1.676 m)   Wt 60.3 kg (133 lb 0.8 oz)   SpO2 (!) 90%   BMI 21.47 kg/m²     Repeat manual /62    Physical Exam  Vitals and nursing note reviewed.   Constitutional:       General: He is not in acute distress.     Appearance: He is normal weight. He is not ill-appearing.      Comments: Frail  Ambulating with portable oxygen   HENT:      Head: Normocephalic and atraumatic.      Nose: No congestion.   Eyes:      Extraocular Movements: Extraocular movements intact.   Cardiovascular:      Rate and Rhythm: Normal rate and regular rhythm.      Pulses: Normal pulses.      Heart sounds: Normal heart sounds. No murmur heard.    No friction rub.   Pulmonary:      Effort: Pulmonary effort is normal. No " respiratory distress.      Breath sounds: Normal breath sounds. No wheezing.      Comments: Wearing nasal cannula O2  Distant lung sounds  Abdominal:      General: Abdomen is flat. Bowel sounds are normal. There is no distension.      Palpations: Abdomen is soft. There is no mass.      Tenderness: There is no abdominal tenderness.   Musculoskeletal:         General: Normal range of motion.      Cervical back: Normal range of motion. No rigidity.      Right lower leg: No edema.      Left lower leg: No edema.      Comments: Low muscle mass  BMI 20   Skin:     Capillary Refill: Capillary refill takes less than 2 seconds.      Findings: Bruising present.   Neurological:      General: No focal deficit present.      Mental Status: He is alert. Mental status is at baseline.      Motor: Weakness present.      Gait: Gait abnormal.   Psychiatric:         Mood and Affect: Mood normal.           Lab Results   Component Value Date    WBC 7.98 03/06/2023    HGB 14.1 03/06/2023    HCT 46.3 03/06/2023     03/06/2023    CHOL 260 (H) 04/19/2020    TRIG 145 04/19/2020    HDL 91 (H) 04/19/2020    ALT 84 (H) 03/06/2023    AST 76 (H) 03/06/2023     (L) 03/06/2023    K 4.1 03/06/2023    CL 89 (L) 03/06/2023    CREATININE 0.9 03/06/2023    BUN 15 03/06/2023    CO2 30 (H) 03/06/2023    TSH 1.137 12/05/2022    INR 1.1 12/08/2022    HGBA1C 5.7 (H) 02/10/2022       Current Outpatient Medications on File Prior to Visit   Medication Sig Dispense Refill    acetaminophen (TYLENOL) 500 MG tablet Take 2 tablets (1,000 mg total) by mouth 2 (two) times a day. 180 tablet 3    albuterol (PROVENTIL/VENTOLIN HFA) 90 mcg/actuation inhaler Inhale 1-2 puffs into the lungs every 6 (six) hours as needed for Wheezing. Rescue 8.5 g 2    albuterol-ipratropium (DUO-NEB) 2.5 mg-0.5 mg/3 mL nebulizer solution Use 1 vial (3 mLs) by nebulization every 4 (four) hours as needed for Wheezing or Shortness of Breath. Rescue 180 mL 2    apixaban (ELIQUIS) 5 mg  Tab Take 1 tablet (5 mg total) by mouth 2 (two) times daily. 180 tablet 3    arformoteroL (BROVANA) 15 mcg/2 mL Nebu Take 2 mLs (15 mcg total) by nebulization 2 (two) times a day. Controller 360 mL 3    atorvastatin (LIPITOR) 40 MG tablet Take 1 tablet (40 mg total) by mouth once daily. 90 tablet 3    bismuth subsalicylate (PEPTO BISMOL) 262 mg/15 mL suspension Take 30 mLs by mouth daily as needed for Indigestion.      budesonide (PULMICORT) 0.5 mg/2 mL nebulizer solution Take 2 mLs (0.5 mg total) by nebulization 2 (two) times a day. Controller 1440 mL 3    calcium carbonate (TUMS ORAL) Take 2 tablets by mouth daily as needed (Heartburn).      diltiaZEM (CARDIZEM) 90 MG tablet Take 2 tablets (180 mg total) by mouth once daily. 180 tablet 3    EScitalopram oxalate (LEXAPRO) 10 MG tablet Take 1 tablet (10 mg total) by mouth once daily. 90 tablet 3    ferrous sulfate 325 (65 FE) MG EC tablet Take 1 tablet (325 mg total) by mouth once daily. 30 tablet 1    folic acid (FOLVITE) 1 MG tablet Take 1 tablet (1 mg total) by mouth once daily. 30 tablet 2    formoterol (PERFOROMIST) 20 mcg/2 mL Nebu Take 2 mLs (20 mcg total) by nebulization 2 (two) times a day. Controller 360 mL 3    furosemide (LASIX) 20 MG tablet Take 1 tablet (20 mg total) by mouth once daily. 90 tablet 3    inhalat.spacing dev,large mask (COMPACT SPACE CHAMBER-LRG MASK) Spcr Use as directed 1 each 0    multivit-min-FA-lycopen-lutein (CERTAVITE SENIOR) 0.4 mg-300 mcg- 250 mcg Tab Take 1 tablet by mouth once daily. 30 tablet 2    nicotine (NICODERM CQ) 21 mg/24 hr Place 1 patch onto the skin once daily. 28 patch 11    ondansetron (ZOFRAN-ODT) 4 MG TbDL Dissolve 1 tablet (4 mg total) by mouth 2 (two) times daily. 40 tablet 1    pantoprazole (PROTONIX) 40 MG tablet Take 1 tablet (40 mg total) by mouth once daily. 30 tablet 11    predniSONE (DELTASONE) 10 MG tablet Take 1 tablet (10 mg total) by mouth once daily. 30 tablet 3    thiamine 100 MG tablet Take 1  tablet (100 mg total) by mouth once daily. 30 tablet 2    tiotropium bromide (SPIRIVA RESPIMAT) 2.5 mcg/actuation inhaler Inhale 2 puffs into the lungs Daily. Controller 4 g 0    varenicline (CHANTIX) 1 mg Tab Take by mouth 2 (two) times daily. 168 tablet 0    LORazepam (ATIVAN) 0.5 MG tablet Take 1 tablet (0.5 mg total) by mouth every 6 (six) hours as needed (anxiety, air hunger). 15 tablet 0    losartan (COZAAR) 25 MG tablet Take 1 tablet (25 mg total) by mouth once daily. 60 tablet 0     No current facility-administered medications on file prior to visit.         Assessment:   62 y.o. male with multiple co-morbid illnesses here to follow-up with PCP and continue work-up of chronic issues    Plan:     Problem List Items Addressed This Visit          Psychiatric    Alcohol abuse     Folic acid, thiamine and MVI in pill packs  Continue current regimen  Advised to avoid tobacco and alcohol            Ophtho    Dry eyes     Patient using numerous inhaled neb treatments, which may be drying his eyes. Also is getting inadequate sleep  OTC eye lubricants ordered from [facility]            Pulmonary    Chronic obstructive pulmonary disease - Primary (Chronic)     Poor compliance with inhalers, does not have the dexterity or strength to use hand-held inhalers  Continue inhalers in neb machine and powder delivered controller inhalers            Cardiac/Vascular    Essential hypertension (Chronic)     BP better controlled today, DID take AM meds   New pill packs today  These include tylenol         RESOLVED: Chronic diastolic congestive heart failure       Health Maintenance         Date Due Completion Date    TETANUS VACCINE Never done ---    Colorectal Cancer Screening Never done ---    Shingles Vaccine (2 of 2) 03/15/2023 1/18/2023    LDCT Lung Screen 01/29/2024 1/29/2023    High Dose Statin 03/10/2024 3/10/2023    Lipid Panel 04/19/2025 4/19/2020    Pneumococcal Vaccines (Age 0-64) (3 - PPSV23 if available, else PCV20)  12/19/2027 12/19/2022            Future Appointments   Date Time Provider Department Center   3/29/2023  1:00 PM Delphine Orona MD [facility] MED CLN [address] [facility]   4/17/2023  9:00 [facility] [address] [facility]   4/24/2023  1:30 PM Hung Malave MD [facility] SLEEP [facility]   5/9/2023  2:00 PM Raina Moore MD [facility] PAL MED [address]   6/6/2023  8:00 AM Mary Jo Rueda, [facility] OPTOMTY [address]         Follow up in about 1 week (around 3/29/2023). Total clinical care time was 10 min, issues addressed include: COPD      Delphine Orona MD/MPH  [facility]  [phone] spectralink

## 2023-03-22 NOTE — PROGRESS NOTES
Adherence packed using Dispill for 28 days:   (Compressed monthly)       Morning Pocket:   Acetaminophen 500 mg (2 tablets)   Diltiazam 90 mg (2 tablets)   Eliquis 5 mg   Escitalopram 10mg  Furosemide 20 mg   Ferrous Sulfate 325mg   Pantoprazole 40 mg   Prednisone 10 mg  Varenicline 1 mg     Evening Pocket:   Acetaminophen 500 mg (2 tablets)   Atorvastatin 40 mg   Eliquis 5 mg   Folic Acid 1mg   MVI 50+   Thiamine 100 mg   Varenicline 1 mg

## 2023-03-29 ENCOUNTER — OFFICE VISIT (OUTPATIENT)
Dept: PRIMARY CARE CLINIC | Facility: CLINIC | Age: 63
End: 2023-03-29
Payer: MEDICARE

## 2023-03-29 VITALS
DIASTOLIC BLOOD PRESSURE: 62 MMHG | WEIGHT: 134.13 LBS | OXYGEN SATURATION: 96 % | TEMPERATURE: 98 F | HEIGHT: 66 IN | HEART RATE: 75 BPM | SYSTOLIC BLOOD PRESSURE: 104 MMHG | BODY MASS INDEX: 21.56 KG/M2

## 2023-03-29 DIAGNOSIS — S90.512A ABRASION, LEFT ANKLE, INITIAL ENCOUNTER: ICD-10-CM

## 2023-03-29 DIAGNOSIS — Z23 NEED FOR TDAP VACCINATION: ICD-10-CM

## 2023-03-29 DIAGNOSIS — I50.32 CHRONIC DIASTOLIC CONGESTIVE HEART FAILURE: Primary | ICD-10-CM

## 2023-03-29 PROCEDURE — 3008F BODY MASS INDEX DOCD: CPT | Mod: CPTII,S$GLB,, | Performed by: INTERNAL MEDICINE

## 2023-03-29 PROCEDURE — 4010F ACE/ARB THERAPY RXD/TAKEN: CPT | Mod: CPTII,S$GLB,, | Performed by: INTERNAL MEDICINE

## 2023-03-29 PROCEDURE — 3074F SYST BP LT 130 MM HG: CPT | Mod: CPTII,S$GLB,, | Performed by: INTERNAL MEDICINE

## 2023-03-29 PROCEDURE — 3078F PR MOST RECENT DIASTOLIC BLOOD PRESSURE < 80 MM HG: ICD-10-PCS | Mod: CPTII,S$GLB,, | Performed by: INTERNAL MEDICINE

## 2023-03-29 PROCEDURE — 99211 PR OFFICE/OUTPT VISIT, EST, LEVL I: ICD-10-PCS | Mod: S$GLB,,, | Performed by: INTERNAL MEDICINE

## 2023-03-29 PROCEDURE — 3008F PR BODY MASS INDEX (BMI) DOCUMENTED: ICD-10-PCS | Mod: CPTII,S$GLB,, | Performed by: INTERNAL MEDICINE

## 2023-03-29 PROCEDURE — 4010F PR ACE/ARB THEARPY RXD/TAKEN: ICD-10-PCS | Mod: CPTII,S$GLB,, | Performed by: INTERNAL MEDICINE

## 2023-03-29 PROCEDURE — 1159F MED LIST DOCD IN RCRD: CPT | Mod: CPTII,S$GLB,, | Performed by: INTERNAL MEDICINE

## 2023-03-29 PROCEDURE — 1159F PR MEDICATION LIST DOCUMENTED IN MEDICAL RECORD: ICD-10-PCS | Mod: CPTII,S$GLB,, | Performed by: INTERNAL MEDICINE

## 2023-03-29 PROCEDURE — 3078F DIAST BP <80 MM HG: CPT | Mod: CPTII,S$GLB,, | Performed by: INTERNAL MEDICINE

## 2023-03-29 PROCEDURE — 3074F PR MOST RECENT SYSTOLIC BLOOD PRESSURE < 130 MM HG: ICD-10-PCS | Mod: CPTII,S$GLB,, | Performed by: INTERNAL MEDICINE

## 2023-03-29 PROCEDURE — 99211 OFF/OP EST MAY X REQ PHY/QHP: CPT | Mod: S$GLB,,, | Performed by: INTERNAL MEDICINE

## 2023-03-29 NOTE — PROGRESS NOTES
Primary Care Provider Appointment - [facility]  SHARED NOTE: Fuad Christian (MS4), Dr Orona (Attending)    Subjective:      Patient ID: Baltazar Mccray is a 62 y.o. male with HFpeF, Afib, HTN, HLD, COPD, LIBRA,  low health literacy, tobacco abuse, and frequent hospitalizations.      Chief Complaint: Follow-up    Prior to this visit, patient's last encounter with PCP was 3/22/2023.    Patients reports he is overall doing well. He continues to endorse that he has quit smoking. Although he continues to chew on cigars for the sweetness. Has some blurry vision and needs some glasses. Has appt scheduled in June. Reports dry eyes He continues to do well on home medications and breathing treatments. Continues to take tylenol for management of headaches. Today in office he was found to be hypotensive. Denies any fever, chest pain, SOB, constipation, diarrhea, worsening of vision, dizziness or lightheadedness. Patient informed to bring in his pill packs next week. Today his main concern is getting a dentist to have his teeth pulled. He now has an appointment on Oct 3rd at 8am. He will need lyft transport to his appointment    After obtaining consent, and per orders of Dr. Delphine Orona, injection of Tdap given by Fuad Christian MS4. Patient instructed to remain in clinic for 20 minutes afterwards, and to report any adverse reaction to me immediately.      NDC:60115-667-67  Lot: [id number]  Expiration: 6/5/23  Location: Right Deltoid  Method: Intramuscular Injection  Time: 10:44 AM  Date: 03/29/2023       - Fuad Christian  MS4    HTN  - Currently taking: Furosemide 20 mg qd  - How often taking BP at home: every morning, average is: 130s  - Today, BP is: 104/62, recheck was 110/64  - Negative for orthostasis, but appears dry     HLD  - Currently taking: Atorvastatin 40 mg   - Last lipid panel was on 4/19/20, showed elevated total cholesterol, LDL wnl   The 10-year ASCVD risk score (Wily EVANS, et al., 2019) is: 22.7%    Values used to  calculate the score:      Age: 62 years      Sex: Male      Is Non- : Yes      Diabetic: No      Tobacco smoker: No      Systolic Blood Pressure: 183 mmHg      Is BP treated: Yes      HDL Cholesterol: 91 mg/dL      Total Cholesterol: 260 mg/dL     HFpeF  Currently taking: Furosemide 20 mg    Last Echo 11/20/2022: EF 53%  Has a high salt diet at home, denies any swelling, sob, or orthopnea at this time.      Afib  Currently taking: Eliquis 5 mg BID, diltiaZEM 90 mg BID     Asthma/COPD  - Currently taking: Brovana, albuterol (PROVENTIL/VENTOLIN HFA) 90 mcg/actuation inhaler, and Bipap  - Tolerating Bipap well   - Smoking status: former smoker, currently using chewing tobacco and nicotine patches currently on chantix    - On 3 L of O2 at home     Mental Health  - Currently taking: Escitalopram 10mg  - Does not see a therapist and psychiatrist   - Social support system consists of brother, lives at his apartment with his cat  - Hobbies include playing pool  - He states that he is able to perform ADL's w/o difficulty.     Specialty notes   Pulmonology  CTA Chest 1/29/23- Images personally reviewed. I agree w/ the radiologist who notes;  1. No evidence of pulmonary embolism.  2. Emphysematous changes of the lungs with mild bilateral interstitial scarring or infiltrate.  3. Mild nodular airspace disease in the left lower lobe could represent minimal consolidation/infiltrate.  Mild scarring or atelectasis are not excluded.  4. Few small lower lobe nonspecific micro nodules.  5. F/u 3 months      Care Gaps:  - tetanus: done today   - CRC screen  - Shingles: due at later date     Medications: Does have pill packs  Morning Pocket:   Acetaminophen 500 mg (2 tablets)   Diltiazam 90 mg -> ADD  Eliquis 5 mg   Escitalopram 10mg  Furosemide 20 mg -> STOP  Ferrous Sulfate 325mg   Pantoprazole 40 mg   Prednisone 10 mg  Varenicline 1 mg     Evening Pocket:   Acetaminophen 500 mg (2 tablets)   Diltiazam 90 mg ->  ADD  Atorvastatin 40 mg   Eliquis 5 mg   Folic Acid 1mg   MVI 50+   Thiamine 100 mg   Varenicline 1 mg     FU in 1 week to specifically check COPD    4Ms for Medical Decision-Making in Older Adults    Last Completed EAWV: None    MOBILITY:  Get Up and Go:  No flowsheet data found.  Activities of Daily Living:  No flowsheet data found.  Whisper Test:  No flowsheet data found.  Disability Status:  Disability Status 10/3/2018   Are you deaf or do you have serious difficulty hearing? No   Are you blind or do you have serious difficulty seeing, even when wearing glasses? No   Because of a physical, mental, or emotional condition, do you have serious difficulty concentrating, remembering, or making decisions? No   Do you have serious difficulty walking or climbing stairs? No   Do you have difficulty dressing or bathing? No   Because of a physical, mental, or emotional condition, do you have difficulty doing errands alone such as visiting a doctor's office or shopping? No     Nutrition Screening:  No flowsheet data found. Screening Score: 0-7 Malnourished, 8-11 At Risk, 12-14 Normal    MENTATION:   Depression Patient Health Questionnaire:  Depression Patient Health Questionnaire 3/22/2023   Over the last two weeks how often have you been bothered by little interest or pleasure in doing things Not at all   Over the last two weeks how often have you been bothered by feeling down, depressed or hopeless Not at all   PHQ-2 Total Score 0   PHQ-4 Total Score -     Has Dementia Dx: No    Cognitive Function Screening:  No flowsheet data found.  Cognitive Function Screening Total - Less than 4 = Abnormal,  Greater than or equal to 4 = Normal    MEDICATIONS:  High Risk Medications:  Total Active Medications: 1  EScitalopram oxalate - 10 MG    WHAT MATTERS MOST:  Advance Care Planning   ACP Status:   Patient has had an ACP conversation  Living Will: Yes  Power of : Yes  LaPOST: No    What is most important right now is to  focus on spending time at homeremaining as independent as possiblelife prolongation    Accordingly, we have decided that the best plan to meet the patient's goals includes continuing with treatment                   Social History     Socioeconomic History    Marital status: Single   Tobacco Use    Smoking status: Former     Packs/day: 1.00     Years: 30.00     Pack years: 30.00     Types: Cigarettes     Quit date: 2023     Years since quittin.1     Passive exposure: Past    Smokeless tobacco: Never    Tobacco comments:     1-2 cigarettes per day      Patient quick smoking 1 month ago   Substance and Sexual Activity    Alcohol use: Yes     Alcohol/week: 2.0 standard drinks     Types: 2 Cans of beer per week     Comment: every night    Drug use: No    Sexual activity: Not Currently   Social History Narrative    Patient' nephew is currently living with him in his apartment. Patient's sister Viry ([phone]) helps manage patient's care.            6/3/21    Patient is no longer staying with family. Patient is currently staying at the [facility] and working on getting into their program for more supportive housing.      Social Determinants of Health     Financial Resource Strain: Low Risk     Difficulty of Paying Living Expenses: Not hard at all   Food Insecurity: Unknown    Worried About Running Out of Food in the Last Year: Patient refused    Ran Out of Food in the Last Year: Patient refused   Transportation Needs: No Transportation Needs    Lack of Transportation (Medical): No    Lack of Transportation (Non-Medical): No   Physical Activity: Unknown    Days of Exercise per Week: Patient refused    Minutes of Exercise per Session: Patient refused   Stress: Stress Concern Present    Feeling of Stress : Rather much   Social Connections: Socially Isolated    Frequency of Communication with Friends and Family: More than three times a week    Frequency of Social Gatherings with Friends and Family: Twice a  "week    Attends Nondenominational Services: Never    Active Member of Clubs or Organizations: No    Attends Club or Organization Meetings: Patient refused    Marital Status: Never    Housing Stability: Low Risk     Unable to Pay for Housing in the Last Year: No    Number of Places Lived in the Last Year: 2    Unstable Housing in the Last Year: No       Review of Systems   Constitutional:  Negative for chills, diaphoresis, fatigue and fever.   HENT:  Positive for dental problem. Negative for facial swelling, postnasal drip, sinus pressure, sinus pain, trouble swallowing and voice change.    Eyes:  Positive for visual disturbance.   Respiratory:  Positive for shortness of breath. Negative for chest tightness.    Cardiovascular:  Negative for chest pain, palpitations and leg swelling.   Gastrointestinal:  Negative for abdominal distention, abdominal pain, constipation and diarrhea.   Endocrine: Negative for polyuria.   Genitourinary:  Negative for difficulty urinating.   Musculoskeletal:  Negative for joint swelling.   Neurological:  Positive for headaches. Negative for dizziness, weakness, light-headedness and numbness.        Chronic HA controlled w/ tylenol      Objective:   /62 (BP Location: Left arm, Patient Position: Sitting, BP Method: Medium (Manual))   Pulse 75   Temp 98.3 °F (36.8 °C) (Oral)   Ht 5' 6" (1.676 m)   Wt 60.9 kg (134 lb 2.4 oz)   SpO2 96%   BMI 21.65 kg/m²     Physical Exam  Constitutional:       General: He is not in acute distress.     Appearance: He is ill-appearing.      Comments: Patient appears malnourished, appears disheveled, clothing w/o any holes or severe damage   HENT:      Head: Normocephalic and atraumatic.      Mouth/Throat:      Comments: Poor dentition with multiple teeth missing  Cardiovascular:      Rate and Rhythm: Normal rate and regular rhythm.      Pulses: Normal pulses.      Heart sounds: Normal heart sounds.   Pulmonary:      Effort: Pulmonary effort is normal. " No respiratory distress.      Breath sounds: Wheezing present.   Chest:      Chest wall: No tenderness.   Abdominal:      General: Bowel sounds are normal. There is no distension.      Palpations: Abdomen is soft.      Tenderness: There is no abdominal tenderness. There is no guarding.   Musculoskeletal:         General: Normal range of motion.      Right lower leg: No edema.      Left lower leg: No edema.      Comments: Low muscle mass   Skin:     General: Skin is warm.      Coloration: Skin is not jaundiced.   Neurological:      General: No focal deficit present.      Mental Status: He is alert. Mental status is at baseline.   Psychiatric:         Mood and Affect: Mood normal.           Lab Results   Component Value Date    WBC 7.98 03/06/2023    HGB 14.1 03/06/2023    HCT 46.3 03/06/2023     03/06/2023    CHOL 260 (H) 04/19/2020    TRIG 145 04/19/2020    HDL 91 (H) 04/19/2020    ALT 84 (H) 03/06/2023    AST 76 (H) 03/06/2023     (L) 03/06/2023    K 4.1 03/06/2023    CL 89 (L) 03/06/2023    CREATININE 0.9 03/06/2023    BUN 15 03/06/2023    CO2 30 (H) 03/06/2023    TSH 1.137 12/05/2022    INR 1.1 12/08/2022    HGBA1C 5.7 (H) 02/10/2022       Current Outpatient Medications on File Prior to Visit   Medication Sig Dispense Refill    acetaminophen (TYLENOL) 500 MG tablet Take 2 tablets (1,000 mg total) by mouth 2 (two) times a day. 180 tablet 3    albuterol (PROVENTIL/VENTOLIN HFA) 90 mcg/actuation inhaler Inhale 1-2 puffs into the lungs every 6 (six) hours as needed for Wheezing. Rescue 8.5 g 2    albuterol-ipratropium (DUO-NEB) 2.5 mg-0.5 mg/3 mL nebulizer solution Use 1 vial (3 mLs) by nebulization every 4 (four) hours as needed for Wheezing or Shortness of Breath. Rescue 180 mL 2    apixaban (ELIQUIS) 5 mg Tab Take 1 tablet (5 mg total) by mouth 2 (two) times daily. 180 tablet 3    arformoteroL (BROVANA) 15 mcg/2 mL Nebu Take 2 mLs (15 mcg total) by nebulization 2 (two) times a day. Controller 360 mL 3     atorvastatin (LIPITOR) 40 MG tablet Take 1 tablet (40 mg total) by mouth once daily. 90 tablet 3    bismuth subsalicylate (PEPTO BISMOL) 262 mg/15 mL suspension Take 30 mLs by mouth daily as needed for Indigestion.      budesonide (PULMICORT) 0.5 mg/2 mL nebulizer solution Take 2 mLs (0.5 mg total) by nebulization 2 (two) times a day. Controller 1440 mL 3    calcium carbonate (TUMS ORAL) Take 2 tablets by mouth daily as needed (Heartburn).      EScitalopram oxalate (LEXAPRO) 10 MG tablet Take 1 tablet (10 mg total) by mouth once daily. 90 tablet 3    ferrous sulfate 325 (65 FE) MG EC tablet Take 1 tablet (325 mg total) by mouth once daily. 30 tablet 1    folic acid (FOLVITE) 1 MG tablet Take 1 tablet (1 mg total) by mouth once daily. 30 tablet 2    formoterol (PERFOROMIST) 20 mcg/2 mL Nebu Take 2 mLs (20 mcg total) by nebulization 2 (two) times a day. Controller 360 mL 3    inhalat.spacing dev,large mask (COMPACT SPACE CHAMBER-LRG MASK) Spcr Use as directed 1 each 0    multivit-min-FA-lycopen-lutein (CERTAVITE SENIOR) 0.4 mg-300 mcg- 250 mcg Tab Take 1 tablet by mouth once daily. 30 tablet 2    nicotine (NICODERM CQ) 21 mg/24 hr Place 1 patch onto the skin once daily. 28 patch 11    ondansetron (ZOFRAN-ODT) 4 MG TbDL Dissolve 1 tablet (4 mg total) by mouth 2 (two) times daily. 40 tablet 1    pantoprazole (PROTONIX) 40 MG tablet Take 1 tablet (40 mg total) by mouth once daily. 30 tablet 11    predniSONE (DELTASONE) 10 MG tablet Take 1 tablet (10 mg total) by mouth once daily. 30 tablet 3    thiamine 100 MG tablet Take 1 tablet (100 mg total) by mouth once daily. 30 tablet 2    tiotropium bromide (SPIRIVA RESPIMAT) 2.5 mcg/actuation inhaler Inhale 2 puffs into the lungs Daily. Controller 4 g 0    varenicline (CHANTIX) 1 mg Tab Take by mouth 2 (two) times daily. 168 tablet 0    [DISCONTINUED] diltiaZEM (CARDIZEM) 90 MG tablet Take 2 tablets (180 mg total) by mouth once daily. 180 tablet 3    [DISCONTINUED]  furosemide (LASIX) 20 MG tablet Take 1 tablet (20 mg total) by mouth once daily. 90 tablet 3    LORazepam (ATIVAN) 0.5 MG tablet Take 1 tablet (0.5 mg total) by mouth every 6 (six) hours as needed (anxiety, air hunger). 15 tablet 0    losartan (COZAAR) 25 MG tablet Take 1 tablet (25 mg total) by mouth once daily. 60 tablet 0     No current facility-administered medications on file prior to visit.         Assessment:   62 y.o. male with multiple co-morbid illnesses here to follow-up with PCP and continue work-up of chronic issues    Plan:   Patient was hypotensive in clinic today. Negative on assessment for orthostasis. Denies any dizziness, lightheadedness, or LOC.     Plan:  Change Diltiazem 180 mg qam to 90 qam and 90 qpm, d/c furosemide     Problem List Items Addressed This Visit          Cardiac/Vascular    Chronic diastolic congestive heart failure - Primary    Relevant Medications    diltiaZEM (CARDIZEM) 90 MG tablet       ID    Need for Tdap vaccination    Relevant Orders    (In Office Administered) Tdap Vaccine       Other    Abrasion, left ankle, initial encounter    Relevant Orders    (In Office Administered) Tdap Vaccine       Health Maintenance         Date Due Completion Date    TETANUS VACCINE Never done ---TODAY    Colorectal Cancer Screening Never done ---    Shingles Vaccine (2 of 2) 03/15/2023 1/18/2023    LDCT Lung Screen 01/29/2024 1/29/2023    High Dose Statin 03/22/2024 3/22/2023    Lipid Panel 04/19/2025 4/19/2020    Pneumococcal Vaccines (Age 0-64) (3 - PPSV23 if available, else PCV20) 12/19/2027 12/19/2022            Future Appointments   Date Time Provider Department Center   4[facility]2023 10[facility]   4/4/2023 11:30 AM Delphine Orona MD [facility] MED CLN [facility]   4/17/2023  9:00 [facility]   4/24/2023  1:30 PM Hung Malave MD [facility] SLEEP [facility]   5/9/2023  2:00 PM Raina LYNCH  MD [facility] PAL MED [facility]   6/6/2023  8:00 AM Mary Jo Rueda OD [facility] OPT[facility]         Follow up in about 1 week (around 4/5/2023). Total clinical care time was 10 min, issues addressed include: medications      Delphine Orona MD/MPH  [facility]  [phone] spectral[employer]

## 2023-03-31 PROBLEM — S90.512A ABRASION, LEFT ANKLE, INITIAL ENCOUNTER: Status: ACTIVE | Noted: 2023-03-31

## 2023-03-31 PROBLEM — Z23 NEED FOR TDAP VACCINATION: Status: ACTIVE | Noted: 2023-03-31

## 2023-03-31 RX ORDER — DILTIAZEM HYDROCHLORIDE 90 MG/1
90 TABLET, FILM COATED ORAL 2 TIMES DAILY
Qty: 180 TABLET | Refills: 3 | Status: SHIPPED | OUTPATIENT
Start: 2023-03-31 | End: 2024-03-30

## 2023-04-03 PROCEDURE — G0179 PR HOME HEALTH MD RECERTIFICATION: ICD-10-PCS | Mod: ,,, | Performed by: INTERNAL MEDICINE

## 2023-04-03 PROCEDURE — G0179 MD RECERTIFICATION HHA PT: HCPCS | Mod: ,,, | Performed by: INTERNAL MEDICINE

## 2023-04-06 ENCOUNTER — TELEPHONE (OUTPATIENT)
Dept: PRIMARY CARE CLINIC | Facility: CLINIC | Age: 63
End: 2023-04-06
Payer: MEDICARE

## 2023-04-06 DIAGNOSIS — J44.1 COPD EXACERBATION: ICD-10-CM

## 2023-04-06 DIAGNOSIS — J42 CHRONIC BRONCHITIS, UNSPECIFIED CHRONIC BRONCHITIS TYPE: ICD-10-CM

## 2023-04-06 RX ORDER — MULTIVIT-MIN/FA/LYCOPEN/LUTEIN .4-300-25
1 TABLET ORAL DAILY
Qty: 30 TABLET | Refills: 2 | Status: SHIPPED | OUTPATIENT
Start: 2023-04-06

## 2023-04-06 RX ORDER — PREDNISONE 10 MG/1
10 TABLET ORAL DAILY
Qty: 30 TABLET | Refills: 3 | Status: SHIPPED | OUTPATIENT
Start: 2023-04-06

## 2023-04-06 NOTE — TELEPHONE ENCOUNTER
Patient passed in the home. He was not on hospice.    Delphine Orona MD/MPH  [facility]  [phone] chelsyink

## 2023-04-06 NOTE — TELEPHONE ENCOUNTER
Patient's pill packs need to be changed to divide up doses of diltiazem, and we need to stop the lasix.    Please remind him to bring his pill pack's to next week's appt. Can we get him set up for a nurse visit to do this on Monday?    THanks,  Dr BURNS    Morning Pocket:   Acetaminophen 500 mg (2 tablets)   Diltiazam 90 mg (1 tablets) -> MOVE 90mg TABLET TO PM POCKET  Eliquis 5 mg   Escitalopram 10mg  Furosemide 20 mg -> STOP  Ferrous Sulfate 325mg   Pantoprazole 40 mg   Prednisone 10 mg  Varenicline 1 mg     Evening Pocket:   Acetaminophen 500 mg (2 tablets  Diltiazam 90 mg (1 tablets) -> MOVE 90mg TABLET FROM AM TO PM POCKET  Atorvastatin 40 mg   Eliquis 5 mg   Folic Acid 1mg   MVI 50+   Thiamine 100 mg   Varenicline 1 mg

## 2023-04-19 ENCOUNTER — EXTERNAL HOME HEALTH (OUTPATIENT)
Dept: HOME HEALTH SERVICES | Facility: HOSPITAL | Age: 63
End: 2023-04-19
Payer: MEDICARE

## 2023-08-08 NOTE — CARE UPDATE
Pt was screaming combative when attempted to get an arterial sample. It came out as mixed venous.    no

## 2023-08-17 NOTE — PLAN OF CARE
Problem: Adult Inpatient Plan of Care  Goal: Plan of Care Review  Outcome: Ongoing, Progressing  Goal: Patient-Specific Goal (Individualized)  Outcome: Ongoing, Progressing  Goal: Absence of Hospital-Acquired Illness or Injury  Outcome: Ongoing, Progressing  Goal: Optimal Comfort and Wellbeing  Outcome: Ongoing, Progressing  Goal: Readiness for Transition of Care  Outcome: Ongoing, Progressing     Problem: Adult Inpatient Plan of Care  Goal: Plan of Care Review  Outcome: Ongoing, Progressing  Goal: Patient-Specific Goal (Individualized)  Outcome: Ongoing, Progressing  Goal: Absence of Hospital-Acquired Illness or Injury  Outcome: Ongoing, Progressing  Goal: Optimal Comfort and Wellbeing  Outcome: Ongoing, Progressing  Goal: Readiness for Transition of Care  Outcome: Ongoing, Progressing     Patient in bed resting with T.V.  on the patient refuse his lovenox .   Medication Refill Request  Refill request for: amphetamine-dextroamphetamine (Adderall XR) 30 MG 24 hr capsule  Preferred pharmacy verified, selected and refill sent.

## 2024-03-29 NOTE — PLAN OF CARE
Problem: Adult Inpatient Plan of Care  Goal: Plan of Care Review  Outcome: Ongoing, Progressing  Goal: Patient-Specific Goal (Individualized)  Outcome: Ongoing, Progressing  Goal: Absence of Hospital-Acquired Illness or Injury  Outcome: Ongoing, Progressing  Goal: Optimal Comfort and Wellbeing  Outcome: Ongoing, Progressing  Goal: Readiness for Transition of Care  Outcome: Ongoing, Progressing     Problem: Adjustment to Illness COPD (Chronic Obstructive Pulmonary Disease)  Goal: Optimal Chronic Illness Coping  Outcome: Ongoing, Progressing     Problem: Functional Ability Impaired COPD (Chronic Obstructive Pulmonary Disease)  Goal: Optimal Level of Functional [place]  Outcome: Ongoing, Progressing     Problem: Infection COPD (Chronic Obstructive Pulmonary Disease)  Goal: Absence of Infection Signs and Symptoms  Outcome: Ongoing, Progressing     Problem: Oral Intake Inadequate COPD (Chronic Obstructive Pulmonary Disease)  Goal: Improved Nutrition Intake  Outcome: Ongoing, Progressing     Problem: Respiratory Compromise COPD (Chronic Obstructive Pulmonary Disease)  Goal: Effective Oxygenation and Ventilation  Outcome: Ongoing, Progressing     Problem: Infection  Goal: Absence of Infection Signs and Symptoms  Outcome: Ongoing, Progressing      Scheduled for repeat  section on 24.  Pre-op instructions provided. Pt given verbal and written instructions with teach back on chlorhexidine shampoo. Pt verbalized understanding with return demonstration. Scheduled for repeat  section on 24.  Pre-op instructions provided. Pt given verbal and written instructions with teach back on chlorhexidine shampoo. Pt verbalized understanding with return demonstration.    Patient instructed to check with gyn about Aspirin protocol

## 2024-04-30 NOTE — ASSESSMENT & PLAN NOTE
- Continue home [facility]     Treatment Goal Explanation (Does Not Render In The Note): Stable for the purposes of categorizing medical decision making is defined by the specific treatment goals for an individual patient. A patient that is not at their treatment goal is not stable, even if the condition has not changed and there is no short- term threat to life or function.

## 2025-03-12 NOTE — ED NOTES
MICHAEL BLOCH    Caller: [facility]    Topic/issue: The facility is requesting some clarification on the patient's medications going forward. Please advise.     Callback Number: [phone]    Thank you,  Aguilar LARSON      Pt SpO2 maintained 95-96% on 3L O2 per nasal cannula - O2 decreased to 2L - will continue to monitor.

## (undated) DEVICE — KIT CUSTOM MANIFOLD

## (undated) DEVICE — CATH JACKY RADIAL 5FR 100CM

## (undated) DEVICE — SPIKE CONTRAST CONTROLLER

## (undated) DEVICE — HEMOSTAT VASC BAND REG 24CM

## (undated) DEVICE — SEE MEDLINE ITEM 156894

## (undated) DEVICE — KIT GLIDESHEATH SLEND 6FR 10CM

## (undated) DEVICE — WIRE BENTSON 035/180

## (undated) DEVICE — OMNIPAQUE 350 200ML

## (undated) DEVICE — SEE MEDLINE ITEM 157187